# Patient Record
Sex: MALE | Race: WHITE | NOT HISPANIC OR LATINO | Employment: OTHER | URBAN - NONMETROPOLITAN AREA
[De-identification: names, ages, dates, MRNs, and addresses within clinical notes are randomized per-mention and may not be internally consistent; named-entity substitution may affect disease eponyms.]

---

## 2017-01-17 ENCOUNTER — APPOINTMENT (OUTPATIENT)
Dept: GENERAL RADIOLOGY | Facility: HOSPITAL | Age: 57
End: 2017-01-17

## 2017-01-17 ENCOUNTER — HOSPITAL ENCOUNTER (EMERGENCY)
Facility: HOSPITAL | Age: 57
Discharge: HOME | End: 2017-01-17
Admitting: EMERGENCY MEDICINE

## 2017-01-17 ENCOUNTER — APPOINTMENT (OUTPATIENT)
Dept: CARDIOLOGY | Facility: HOSPITAL | Age: 57
End: 2017-01-17

## 2017-01-17 VITALS
DIASTOLIC BLOOD PRESSURE: 78 MMHG | SYSTOLIC BLOOD PRESSURE: 132 MMHG | RESPIRATION RATE: 16 BRPM | OXYGEN SATURATION: 95 % | BODY MASS INDEX: 33.72 KG/M2 | TEMPERATURE: 97.6 F | WEIGHT: 249 LBS | HEART RATE: 61 BPM | HEIGHT: 72 IN

## 2017-01-17 DIAGNOSIS — R07.9 CHEST PAIN, UNSPECIFIED TYPE: Primary | ICD-10-CM

## 2017-01-17 PROBLEM — I25.119 CORONARY ARTERY DISEASE INVOLVING NATIVE CORONARY ARTERY OF NATIVE HEART WITH ANGINA PECTORIS (HCC): Status: ACTIVE | Noted: 2017-01-17

## 2017-01-17 LAB
ALBUMIN SERPL-MCNC: 4.5 G/DL (ref 3.5–5)
ALBUMIN/GLOB SERPL: 1.4 G/DL (ref 1.1–2.5)
ALP SERPL-CCNC: 84 U/L (ref 24–120)
ALT SERPL W P-5'-P-CCNC: 48 U/L (ref 0–54)
AMYLASE SERPL-CCNC: 66 U/L (ref 30–110)
ANION GAP SERPL CALCULATED.3IONS-SCNC: 14 MMOL/L (ref 4–13)
APTT PPP: 28.7 SECONDS (ref 24.1–34.8)
AST SERPL-CCNC: 36 U/L (ref 7–45)
BASOPHILS # BLD AUTO: 0.02 10*3/MM3 (ref 0–0.2)
BASOPHILS NFR BLD AUTO: 0.4 % (ref 0–2)
BH CV STRESS BP STAGE 1: NORMAL
BH CV STRESS DURATION MIN STAGE 1: 3
BH CV STRESS DURATION MIN STAGE 2: 2
BH CV STRESS DURATION SEC STAGE 1: 0
BH CV STRESS DURATION SEC STAGE 2: 31
BH CV STRESS ECHO POST STRESS EJECTION FRACTION EF: 60 %
BH CV STRESS GRADE STAGE 1: 10
BH CV STRESS GRADE STAGE 2: 12
BH CV STRESS HR STAGE 1: 105
BH CV STRESS HR STAGE 2: 121
BH CV STRESS METS STAGE 1: 5
BH CV STRESS METS STAGE 2: 7.5
BH CV STRESS PROTOCOL 1: NORMAL
BH CV STRESS PROTOCOL 2 BP STAGE 1: NORMAL
BH CV STRESS PROTOCOL 2 BP STAGE 2: NORMAL
BH CV STRESS PROTOCOL 2 BP STAGE 3: 127
BH CV STRESS PROTOCOL 2 BP STAGE 4: 137
BH CV STRESS PROTOCOL 2 DOSE DOBUTAMINE STAGE 1: 10
BH CV STRESS PROTOCOL 2 DOSE DOBUTAMINE STAGE 2: 20
BH CV STRESS PROTOCOL 2 DOSE DOBUTAMINE STAGE 3: 30
BH CV STRESS PROTOCOL 2 DOSE DOBUTAMINE STAGE 4: 40
BH CV STRESS PROTOCOL 2 DURATION MIN STAGE 1: 3
BH CV STRESS PROTOCOL 2 DURATION MIN STAGE 2: 3
BH CV STRESS PROTOCOL 2 DURATION MIN STAGE 3: 3
BH CV STRESS PROTOCOL 2 DURATION MIN STAGE 4: 2
BH CV STRESS PROTOCOL 2 DURATION SEC STAGE 1: 0
BH CV STRESS PROTOCOL 2 DURATION SEC STAGE 2: 0
BH CV STRESS PROTOCOL 2 DURATION SEC STAGE 3: 0
BH CV STRESS PROTOCOL 2 DURATION SEC STAGE 4: 14
BH CV STRESS PROTOCOL 2 HR STAGE 1: 73
BH CV STRESS PROTOCOL 2 HR STAGE 2: 97
BH CV STRESS PROTOCOL 2 HR STAGE 3: NORMAL
BH CV STRESS PROTOCOL 2 HR STAGE 4: NORMAL
BH CV STRESS PROTOCOL 2 STAGE 1: 1
BH CV STRESS PROTOCOL 2 STAGE 2: 2
BH CV STRESS PROTOCOL 2 STAGE 3: 3
BH CV STRESS PROTOCOL 2 STAGE 4: 4
BH CV STRESS PROTOCOL 2: NORMAL
BH CV STRESS RECOVERY BP: NORMAL MMHG
BH CV STRESS RECOVERY HR: 89 BPM
BH CV STRESS SPEED STAGE 1: 1.7
BH CV STRESS SPEED STAGE 2: 2.5
BH CV STRESS STAGE 1: 1
BH CV STRESS STAGE 2: 2
BILIRUB SERPL-MCNC: 0.9 MG/DL (ref 0.1–1)
BILIRUB UR QL STRIP: NEGATIVE
BUN BLD-MCNC: 15 MG/DL (ref 5–21)
BUN/CREAT SERPL: 15 (ref 7–25)
CALCIUM SPEC-SCNC: 9.5 MG/DL (ref 8.4–10.4)
CHLORIDE SERPL-SCNC: 98 MMOL/L (ref 98–110)
CK MB SERPL-CCNC: 1.37 NG/ML (ref 0–5)
CK SERPL-CCNC: 92 U/L (ref 0–203)
CLARITY UR: CLEAR
CO2 SERPL-SCNC: 27 MMOL/L (ref 24–31)
COLOR UR: YELLOW
CREAT BLD-MCNC: 1 MG/DL (ref 0.5–1.4)
CRP SERPL-MCNC: 1.89 MG/DL (ref 0–0.99)
D DIMER PPP FEU-MCNC: 0.34 MG/L (FEU) (ref 0–0.5)
DEPRECATED RDW RBC AUTO: 41.4 FL (ref 40–54)
EOSINOPHIL # BLD AUTO: 0.17 10*3/MM3 (ref 0–0.7)
EOSINOPHIL NFR BLD AUTO: 3.3 % (ref 0–4)
ERYTHROCYTE [DISTWIDTH] IN BLOOD BY AUTOMATED COUNT: 12.7 % (ref 12–15)
GFR SERPL CREATININE-BSD FRML MDRD: 77 ML/MIN/1.73
GLOBULIN UR ELPH-MCNC: 3.3 GM/DL
GLUCOSE BLD-MCNC: 148 MG/DL (ref 70–100)
GLUCOSE UR STRIP-MCNC: NEGATIVE MG/DL
HCT VFR BLD AUTO: 40.6 % (ref 40–52)
HGB BLD-MCNC: 13.6 G/DL (ref 14–18)
HGB UR QL STRIP.AUTO: NEGATIVE
IMM GRANULOCYTES # BLD: 0.02 10*3/MM3 (ref 0–0.03)
IMM GRANULOCYTES NFR BLD: 0.4 % (ref 0–5)
INR PPP: 0.91 (ref 0.91–1.09)
KETONES UR QL STRIP: NEGATIVE
LEUKOCYTE ESTERASE UR QL STRIP.AUTO: NEGATIVE
LIPASE SERPL-CCNC: 38 U/L (ref 23–203)
LV EF 2D ECHO EST: 55 %
LYMPHOCYTES # BLD AUTO: 1.59 10*3/MM3 (ref 0.72–4.86)
LYMPHOCYTES NFR BLD AUTO: 31.2 % (ref 15–45)
MAXIMAL PREDICTED HEART RATE: 162 BPM
MCH RBC QN AUTO: 30.2 PG (ref 28–32)
MCHC RBC AUTO-ENTMCNC: 33.5 G/DL (ref 33–36)
MCV RBC AUTO: 90.2 FL (ref 82–95)
MONOCYTES # BLD AUTO: 0.52 10*3/MM3 (ref 0.19–1.3)
MONOCYTES NFR BLD AUTO: 10.2 % (ref 4–12)
NEUTROPHILS # BLD AUTO: 2.78 10*3/MM3 (ref 1.87–8.4)
NEUTROPHILS NFR BLD AUTO: 54.5 % (ref 39–78)
NITRITE UR QL STRIP: NEGATIVE
PERCENT MAX PREDICTED HR: 84.57 %
PH UR STRIP.AUTO: 5.5 [PH] (ref 5–8)
PLATELET # BLD AUTO: 110 10*3/MM3 (ref 130–400)
PMV BLD AUTO: 12.1 FL (ref 6–12)
POTASSIUM BLD-SCNC: 4.3 MMOL/L (ref 3.5–5.3)
PROT SERPL-MCNC: 7.8 G/DL (ref 6.3–8.7)
PROT UR QL STRIP: NEGATIVE
PROTHROMBIN TIME: 12.5 SECONDS (ref 11.9–14.6)
RBC # BLD AUTO: 4.5 10*6/MM3 (ref 4.8–5.9)
SODIUM BLD-SCNC: 139 MMOL/L (ref 135–145)
SP GR UR STRIP: 1.03 (ref 1–1.03)
STRESS BASELINE BP: NORMAL MMHG
STRESS BASELINE HR: 73 BPM
STRESS PERCENT HR: 99 %
STRESS POST EXERCISE DUR MIN: 11 MIN
STRESS POST EXERCISE DUR SEC: 14 SEC
STRESS POST PEAK BP: NORMAL MMHG
STRESS POST PEAK HR: 137 BPM
STRESS TARGET HR: 138 BPM
TROPONIN I SERPL-MCNC: 0 NG/ML (ref 0–0.07)
TROPONIN I SERPL-MCNC: 0 NG/ML (ref 0–0.07)
TROPONIN I SERPL-MCNC: <0.012 NG/ML (ref 0–0.03)
UROBILINOGEN UR QL STRIP: NORMAL
WBC NRBC COR # BLD: 5.1 10*3/MM3 (ref 4.8–10.8)

## 2017-01-17 PROCEDURE — 25010000002 PERFLUTREN (DEFINITY) 8.476 MG IN SODIUM CHLORIDE 10 ML INJECTION: Performed by: INTERNAL MEDICINE

## 2017-01-17 PROCEDURE — 82550 ASSAY OF CK (CPK): CPT | Performed by: NURSE PRACTITIONER

## 2017-01-17 PROCEDURE — 96374 THER/PROPH/DIAG INJ IV PUSH: CPT

## 2017-01-17 PROCEDURE — 84484 ASSAY OF TROPONIN QUANT: CPT | Performed by: NURSE PRACTITIONER

## 2017-01-17 PROCEDURE — 99285 EMERGENCY DEPT VISIT HI MDM: CPT

## 2017-01-17 PROCEDURE — 93017 CV STRESS TEST TRACING ONLY: CPT

## 2017-01-17 PROCEDURE — 93351 STRESS TTE COMPLETE: CPT

## 2017-01-17 PROCEDURE — 93350 STRESS TTE ONLY: CPT

## 2017-01-17 PROCEDURE — 82553 CREATINE MB FRACTION: CPT | Performed by: NURSE PRACTITIONER

## 2017-01-17 PROCEDURE — 85730 THROMBOPLASTIN TIME PARTIAL: CPT | Performed by: NURSE PRACTITIONER

## 2017-01-17 PROCEDURE — 74220 X-RAY XM ESOPHAGUS 1CNTRST: CPT

## 2017-01-17 PROCEDURE — 86140 C-REACTIVE PROTEIN: CPT | Performed by: NURSE PRACTITIONER

## 2017-01-17 PROCEDURE — 85610 PROTHROMBIN TIME: CPT | Performed by: NURSE PRACTITIONER

## 2017-01-17 PROCEDURE — 81003 URINALYSIS AUTO W/O SCOPE: CPT | Performed by: NURSE PRACTITIONER

## 2017-01-17 PROCEDURE — 82150 ASSAY OF AMYLASE: CPT | Performed by: NURSE PRACTITIONER

## 2017-01-17 PROCEDURE — 93005 ELECTROCARDIOGRAM TRACING: CPT | Performed by: NURSE PRACTITIONER

## 2017-01-17 PROCEDURE — 80053 COMPREHEN METABOLIC PANEL: CPT | Performed by: NURSE PRACTITIONER

## 2017-01-17 PROCEDURE — 84484 ASSAY OF TROPONIN QUANT: CPT

## 2017-01-17 PROCEDURE — 85379 FIBRIN DEGRADATION QUANT: CPT | Performed by: NURSE PRACTITIONER

## 2017-01-17 PROCEDURE — 83690 ASSAY OF LIPASE: CPT | Performed by: NURSE PRACTITIONER

## 2017-01-17 PROCEDURE — 93018 CV STRESS TEST I&R ONLY: CPT | Performed by: INTERNAL MEDICINE

## 2017-01-17 PROCEDURE — 71010 HC CHEST PA OR AP: CPT

## 2017-01-17 PROCEDURE — 93005 ELECTROCARDIOGRAM TRACING: CPT

## 2017-01-17 PROCEDURE — 93010 ELECTROCARDIOGRAM REPORT: CPT | Performed by: INTERNAL MEDICINE

## 2017-01-17 PROCEDURE — 85025 COMPLETE CBC W/AUTO DIFF WBC: CPT | Performed by: NURSE PRACTITIONER

## 2017-01-17 PROCEDURE — 25010000003 DOBUTAMINE PER 250 MG: Performed by: INTERNAL MEDICINE

## 2017-01-17 PROCEDURE — 93352 ADMIN ECG CONTRAST AGENT: CPT | Performed by: INTERNAL MEDICINE

## 2017-01-17 PROCEDURE — 93350 STRESS TTE ONLY: CPT | Performed by: INTERNAL MEDICINE

## 2017-01-17 PROCEDURE — 99213 OFFICE O/P EST LOW 20 MIN: CPT | Performed by: INTERNAL MEDICINE

## 2017-01-17 RX ORDER — NITROGLYCERIN 0.4 MG/1
0.4 TABLET SUBLINGUAL
Status: COMPLETED | OUTPATIENT
Start: 2017-01-17 | End: 2017-01-17

## 2017-01-17 RX ORDER — HYDRALAZINE HYDROCHLORIDE 25 MG/1
25 TABLET, FILM COATED ORAL 2 TIMES DAILY
COMMUNITY
End: 2017-04-21 | Stop reason: ALTCHOICE

## 2017-01-17 RX ORDER — INSULIN GLARGINE 100 [IU]/ML
20 INJECTION, SOLUTION SUBCUTANEOUS DAILY
COMMUNITY
End: 2017-04-21 | Stop reason: ALTCHOICE

## 2017-01-17 RX ORDER — DOBUTAMINE HYDROCHLORIDE 100 MG/100ML
10-50 INJECTION INTRAVENOUS
Status: DISCONTINUED | OUTPATIENT
Start: 2017-01-17 | End: 2017-01-17 | Stop reason: HOSPADM

## 2017-01-17 RX ORDER — ASPIRIN 325 MG
325 TABLET ORAL ONCE
Status: COMPLETED | OUTPATIENT
Start: 2017-01-17 | End: 2017-01-17

## 2017-01-17 RX ORDER — SODIUM CHLORIDE 0.9 % (FLUSH) 0.9 %
10 SYRINGE (ML) INJECTION AS NEEDED
Status: DISCONTINUED | OUTPATIENT
Start: 2017-01-17 | End: 2017-01-17 | Stop reason: HOSPADM

## 2017-01-17 RX ADMIN — SODIUM CHLORIDE 5 ML: 9 INJECTION INTRAMUSCULAR; INTRAVENOUS; SUBCUTANEOUS at 14:57

## 2017-01-17 RX ADMIN — ASPIRIN 325 MG: 325 TABLET, COATED ORAL at 10:45

## 2017-01-17 RX ADMIN — NITROGLYCERIN 0.4 MG: 0.4 TABLET SUBLINGUAL at 11:14

## 2017-01-17 RX ADMIN — DOBUTAMINE HYDROCHLORIDE 40 MCG/KG/MIN: 100 INJECTION INTRAVENOUS at 15:26

## 2017-01-17 RX ADMIN — NITROGLYCERIN 0.4 MG: 0.4 TABLET SUBLINGUAL at 10:45

## 2017-01-17 RX ADMIN — NITROGLYCERIN 0.4 MG: 0.4 TABLET SUBLINGUAL at 11:00

## 2017-01-17 RX ADMIN — SODIUM CHLORIDE 5 ML: 9 INJECTION INTRAMUSCULAR; INTRAVENOUS; SUBCUTANEOUS at 15:37

## 2017-01-17 NOTE — CONSULTS
[facility] HEART GROUP CONSULT NOTE    Referring Provider: No ref. provider found    Reason for Consultation: Chest Pain     Chief Complaint   Patient presents with   • Chest Pain       Subjective .     History of present illness:  Carlos A Boyer Jr. is a 56 y.o. male with a known PMH significant for CAD s/p multiple stents, most recent cath 1/7/16, which revealed widely patent previously placed stents in the left anterior descending and obstructive disease involving the diagonal branch which was treated medically, Type 2 DM, HTN, and previous CVA, who presented to [facility] via ED with complaints of chest pain. Patient reports a possible syncopal event 2 days ago- BP and BG reportedly normal. The following day, he started having left sided sharp non exertional CP with pressure with associated dyspnea, unrelieved with Tums. Due to continued symptoms he presented for medical evaluation. Patient was previously followed by Dr. Broadbent. Initial troponin negative. No acute ECG changes noted. Relieved with SL Nitro.     History  Past Medical History   Diagnosis Date   • Coronary artery disease    • Diabetes mellitus    • GERD (gastroesophageal reflux disease)    • Hypertension    • Kidney stone    • Murmur, heart    • Myocardial infarction    • Seizures    • Stroke    ,   Past Surgical History   Procedure Laterality Date   • Hernia repair     • Kidney stone surgery     • Cardiac catheterization  01/2016     Dr. Broadbent; widely patent previously placed stents in the left anterior descending and obstructive disease involving the diagonal branch which was treated medically   • Coronary stent placement     • Coronary angioplasty     ,   History reviewed. No pertinent family history.,   Social History   Substance Use Topics   • Smoking status: Former Smoker   • Smokeless tobacco: None   • Alcohol use No   ,     Medications  Current Facility-Administered Medications   Medication Dose Route Frequency Provider Last  Rate Last Dose   • sodium chloride 0.9 % flush 10 mL  10 mL Intravenous PRN TEENA Arriola         Current Outpatient Prescriptions   Medication Sig Dispense Refill   • albuterol (PROVENTIL HFA;VENTOLIN HFA) 108 (90 BASE) MCG/ACT inhaler Inhale 2 puffs Every 6 (Six) Hours As Needed for wheezing.     • carboxymethylcellulose (REFRESH PLUS) 0.5 % solution 1 drop 4 (Four) Times a Day As Needed for dry eyes.     • clopidogrel (PLAVIX) 75 MG tablet Take 75 mg by mouth Daily.     • etodolac (LODINE) 400 MG tablet Take 400 mg by mouth 2 (Two) Times a Day.     • FLUTICASONE PROPIONATE, NASAL, NA 50 mcg into each nostril Daily. 2 SPRAYS IN EACH NOSTRIL     • glipiZIDE (GLUCOTROL) 10 MG tablet Take 20 mg by mouth 2 (Two) Times a Day Before Meals.     • hydrALAZINE (APRESOLINE) 25 MG tablet Take 25 mg by mouth 2 (Two) Times a Day.     • insulin glargine (LANTUS) 100 UNIT/ML injection Inject 20 Units under the skin Daily.     • isosorbide dinitrate (ISORDIL) 20 MG tablet Take 1 tablet by mouth 2 (Two) Times a Day. 60 tablet 0   • lamoTRIgine (LaMICtal) 100 MG tablet Take 200 mg by mouth 2 (Two) Times a Day.     • levETIRAcetam (KEPPRA) 500 MG tablet Take 2,000 mg by mouth 2 (Two) Times a Day.     • lisinopril (PRINIVIL,ZESTRIL) 40 MG tablet Take 0.5 tablets by mouth Every Night. 30 tablet 0   • metFORMIN (GLUCOPHAGE) 1000 MG tablet Take 1,000 mg by mouth 2 (Two) Times a Day With Meals.     • metoprolol tartrate (LOPRESSOR) 50 MG tablet Take 1 tablet by mouth 2 (Two) Times a Day. 60 tablet 0   • nitroglycerin (NITROSTAT) 0.4 MG SL tablet Place 0.4 mg under the tongue Every 5 (Five) Minutes As Needed for chest pain. Take no more than 3 doses in 15 minutes.     • pantoprazole (PROTONIX) 40 MG EC tablet Take 40 mg by mouth Daily.     • psyllium (METAMUCIL) 58.6 % packet Take  by mouth Daily. 1 TABLESPOON     • rosuvastatin (CRESTOR) 40 MG tablet Take 20 mg by mouth Daily.     • triamterene-hydrochlorothiazide (MAXZIDE)  "75-50 MG per tablet Take 0.5 tablets by mouth Daily.     • ammonium lactate (AMLACTIN) 12 % cream Apply 1 application topically 2 (Two) Times a Day As Needed for dry skin.     • ketoconazole (NIZORAL) 2 % cream Apply 1 application topically 2 (Two) Times a Day.     • LACTOBACILLUS PO Take  by mouth Daily.     • methocarbamol (ROBAXIN) 750 MG tablet Take 750 mg by mouth 4 (Four) Times a Day As Needed for muscle spasms.     • promethazine (PHENERGAN) 50 MG tablet Take 25 mg by mouth Every 6 (Six) Hours As Needed for nausea or vomiting.     • urea (CARMOL) 40 % ointment Apply  topically Daily.         Allergies:  Atorvastatin; Flagyl [metronidazole]; and Ciprofloxacin    Review of Systems  Review of Systems   Constitution: Positive for chills, diaphoresis and malaise/fatigue. Negative for fever and weakness.   Cardiovascular: Positive for chest pain, dyspnea on exertion, near-syncope and syncope. Negative for irregular heartbeat, leg swelling, orthopnea, palpitations and paroxysmal nocturnal dyspnea.   Respiratory: Positive for cough, shortness of breath and wheezing. Negative for sleep disturbances due to breathing and sputum production.    Skin: Negative for rash.   Musculoskeletal: Positive for falls.        Chronic left shoulder pain    Gastrointestinal: Positive for abdominal pain. Negative for bloating, nausea and vomiting.   Neurological: Positive for dizziness. Negative for focal weakness.   Psychiatric/Behavioral: Negative for altered mental status.       Objective     Physical Exam:  Patient Vitals for the past 24 hrs:   BP Temp Temp src Pulse Resp SpO2 Height Weight   01/17/17 1130 107/68 - - 62 18 93 % - -   01/17/17 1116 103/64 - - 66 12 96 % - -   01/17/17 1013 - 97.6 °F (36.4 °C) Oral - - - - -   01/17/17 1005 - - - - - - 72\" (182.9 cm) 249 lb (113 kg)   01/17/17 0954 121/80 - - 62 11 96 % - -   01/17/17 0949 140/94 - - - - - - -     Physical Exam   Constitutional: He is oriented to person, place, and " time. He appears well-developed and well-nourished. He is cooperative. No distress.   HENT:   Head: Normocephalic and atraumatic.   Cardiovascular: Normal rate, regular rhythm, S1 normal, S2 normal, normal heart sounds and intact distal pulses.    No murmur heard.  Telemetry: SR/SB 50's    Pulmonary/Chest: Effort normal and breath sounds normal. No accessory muscle usage. No respiratory distress.   Abdominal: Soft. Bowel sounds are normal. He exhibits distension. There is tenderness (With palpation).   Musculoskeletal: He exhibits no edema.   Neurological: He is alert and oriented to person, place, and time.   Skin: Skin is warm and dry. No rash noted. He is not diaphoretic.   Psychiatric: He has a normal mood and affect. His speech is normal and behavior is normal.   Vitals reviewed.      Results Review:   I reviewed the patient's new clinical results.  Lab Results   Component Value Date    WBC 5.10 01/17/2017    HGB 13.6 (L) 01/17/2017    HCT 40.6 01/17/2017    MCV 90.2 01/17/2017     (L) 01/17/2017     Lab Results   Component Value Date    GLUCOSE 148 (H) 01/17/2017    CALCIUM 9.5 01/17/2017     01/17/2017    K 4.3 01/17/2017    CO2 27.0 01/17/2017    CL 98 01/17/2017    BUN 15 01/17/2017    CREATININE 1.00 01/17/2017    EGFRIFNONA 77 01/17/2017    BCR 15.0 01/17/2017    ANIONGAP 14.0 (H) 01/17/2017   Troponin @1010: 0.00       Imaging Results (last 72 hours)     Procedure Component Value Units Date/Time    XR Chest 1 View [[id number]] Collected:  01/17/17 1010     Updated:  01/17/17 1012    Narrative:       XR CHEST 1 VW- 1/17/2017 9:58 CST     HISTORY: chest  pain       COMPARISON: 05/24/2016.     FINDINGS:   The lungs are clear. The heart remains enlarged. Pulmonary vascular  structures are stable in appearance.      The osseous structures and surrounding soft tissues demonstrate no acute  abnormality.       Impression:       1. Mild cardiomegaly.     Electronically Signed By-Dr. Delio Langston MD  On:1/17/2017 11:10 AM  EST  This report was finalized on 01/17/2017 10:10 by Dr. Delio Langston MD.        · 10/16/16- Echo: Left ventricular function is normal with estimated ejection fraction of 55-60%.  · Left ventricular diastolic dysfunction (grade II) consistent with pseudonormalization.  · Right ventricular size and systolic function are normal.  · Mild mitral valve regurgitation is present          Mild aortic valve regurgitation is present.      Principal Problem:    Coronary artery disease involving native coronary artery of native heart with unspecified angina pectoris    Active Problems:    Type 2 diabetes mellitus    Essential hypertension    Seizure disorder    Carotid disease, bilateral      Plan   1. Repeat second set of cardiac enzymes and ECG 3 hours after the initial set.   2. Obtain Stress Echo if second set of enzymes negative.   3. Obtain barium swallow   4. D/C home if negative Stress Echo low risk for ischemia with plans for close [facility] follow up.     Plan of care discussed with Dr. Ramey.       TEENA Borges        Please note this cardiology consultation note is the result of a face to face consultation with the patient, in addition to reviewing medical records at length by myself, TEENA Simental.     Time: More than 50% of time spent in counseling and coordination of care:  Total face-to-face/floor time 40 min.  Time spent in counseling 25 min. Counseling included the following topics: lab results, ECG results, assessment, plan of care

## 2017-01-17 NOTE — ED PROVIDER NOTES
Subjective   HPI Comments: Patient is a 48-year-old white male who presents ER today complaining of left-sided chest pain.  Patient reports that he is pain is to the left breast.  Reports the pain is nonradiating.  Patient has a history of 6 stents in the past.  Patient last had a 2D echocardiogram done on 10/17/2016.  This showed a left ventricular function that was normal with an estimated ejection fraction 55-60%.  Had a left ventricular diastolic dysfunction grade 2 right right ventricular size and systolic function are normal.  Mild mitral valve regurgitation present as well as mild aortic valve regurgitation.  Patient had a cardiac catheterization done on 1/11/ 2016.  This was done by Dr. Broadbent pt former cardiologist.  Patient had a stent placed to the left circumflex coronary artery at that time.  There was widely patent previously placed stent in the left anterior descending.  Patient reports that he had chest pain yesterday.  He reports it is nonradiating it is constant and not intermittent in nature.  He reports that he does feel somewhat short of breath with this.  Patient is previous history of DVT in the past.  He is on Plavix at this time.  Patient presents ER today for further evaluation.  He denies any abdominal pain as she nausea vomiting or fever.  She denies any cough congestion.  She also reports that yesterday he did have some heart palpitations.  He states that his heart rate felt irregular.  He noted at one time his heart rate was 90 several hours later it was down the 50s.  His wife reports several days ago he had a heart rate in the high 40s.  Patient states this is unusual for him.  He is on several beta blockers at this time.  Presents to ER today for evaluation.                                                                                                                                                                                                                                                                                                                                                                                                                                                                                                                                                                                                                                                                                                                                                                                                                                                             Patient is a 56 y.o. male presenting with chest pain.   History provided by:  Patient   used: No    Chest Pain   Pain location:  Substernal area  Pain quality: aching and throbbing    Pain radiates to:  Does not radiate  Pain severity:  Mild  Onset quality:  Sudden  Duration:  1 day  Timing:  Constant  Progression:  Worsening  Chronicity:  New  Context: at rest    Context: not breathing, not drug use, not eating, not intercourse, not lifting, not movement, not raising an arm, not stress and not trauma    Relieved by:  Nothing  Worsened by:  Nothing  Ineffective treatments:  None tried  Associated symptoms: shortness of breath    Associated symptoms: no abdominal pain, no AICD problem, no altered mental status, no anorexia, no anxiety, no back pain, no claudication, no cough, no dizziness, no dysphagia, no fatigue, no fever, no headache, no heartburn, no lower extremity edema, no nausea, no near-syncope, no numbness, no orthopnea, no palpitations, no PND, no syncope, no vomiting and no weakness    Risk factors: male sex    Risk factors: no aortic disease, no birth control, no coronary  artery disease, no diabetes mellitus, no Marcia-Danlos syndrome, no high cholesterol, no hypertension, no immobilization, no Marfan's syndrome, not obese, not pregnant, no prior DVT/PE, no smoking and no surgery        Review of Systems   Constitutional: Negative for fatigue and fever.   HENT: Negative for trouble swallowing.    Respiratory: Positive for shortness of breath. Negative for cough.    Cardiovascular: Positive for chest pain. Negative for palpitations, orthopnea, claudication, syncope, PND and near-syncope.   Gastrointestinal: Negative for abdominal pain, anorexia, heartburn, nausea and vomiting.   Musculoskeletal: Negative for back pain.   Neurological: Negative for dizziness, weakness, numbness and headaches.   All other systems reviewed and are negative.      Past Medical History   Diagnosis Date   • Asthma    • Coronary artery disease    • Diabetes mellitus    • GERD (gastroesophageal reflux disease)    • Hyperlipidemia    • Hypertension    • Kidney stone    • Murmur, heart    • Myocardial infarction    • Seizures    • Stroke        Allergies   Allergen Reactions   • Atorvastatin Anaphylaxis   • Flagyl [Metronidazole] Anaphylaxis   • Ciprofloxacin Hives       Past Surgical History   Procedure Laterality Date   • Hernia repair     • Kidney stone surgery     • Cardiac catheterization  01/2016     Dr. Broadbent; widely patent previously placed stents in the left anterior descending and obstructive disease involving the diagonal branch which was treated medically   • Coronary stent placement     • Coronary angioplasty         Family History   Problem Relation Age of Onset   • Heart disease Father    • Heart disease Maternal Grandmother        Social History     Social History   • Marital status:      Spouse name: N/A   • Number of children: N/A   • Years of education: N/A     Social History Main Topics   • Smoking status: Former Smoker   • Smokeless tobacco: Former User     Types: Chew     Quit  date: 2009   • Alcohol use No   • Drug use: No   • Sexual activity: Defer     Other Topics Concern   • None     Social History Narrative           Objective   Physical Exam   Constitutional: He is oriented to person, place, and time. He appears well-developed and well-nourished.   HENT:   Head: Normocephalic and atraumatic.   Eyes: Conjunctivae are normal. Pupils are equal, round, and reactive to light.   Neck: Normal range of motion. Neck supple.   Cardiovascular: Normal rate, regular rhythm and normal heart sounds.    Pulmonary/Chest: Effort normal and breath sounds normal.   Abdominal: Soft. Bowel sounds are normal.   Musculoskeletal: Normal range of motion.   Neurological: He is alert and oriented to person, place, and time.   Skin: Skin is warm and dry.   Psychiatric: He has a normal mood and affect.   Nursing note and vitals reviewed.      Procedures         ED Course  ED Course   Comment By Time   CXR: 1. Mild cardiomegaly. Mervat Choe, APRN 01/17 1110   Patient was reviewed at this time.  His urinalysis is unremarkable.  Troponin is 0.00, CRP 1.89.  His glucose is 148.  Other labs unremarkable at this time.  Mervat Choe, APRN 01/17 1111   Patient did have a 325 mg aspirin by mouth.  He also received nitroglycerin 0.4 mg sublingual ×3 tablets.  The is did relieve his pain at this time. Mervat Choe, APRN 01/17 1119   At this time, patient is pain-free.  I did discuss the results: This time.  He agrees with plan of care.  I did talk with Mirna with cardiology.  They will have someone come and evaluate the patient for further evaluation at this time.  And his spouse is up-to-date on the plantar at this time.  Patient is resting comfortably no distress noted. Mervat Choe, APRN 01/17 1119   Per cardiology consult, pt will have barium swallow completed. Will have 2 sets of cardiac enzymes obtained; if second set negative, will have stress echo.  Mervat Choe, APRN 01/17 1327   Barium  swallow: normal Mervat Choe, APRN 01/17 1351   Pt had negative second set of cardiac markers and EKG at this time. Pt having stress test performed at this time.  Mervat Choe, APRN 01/17 1514   Pt back from stress test at this time; pending reading of stress test for final results.  Mervat Choe, APRN 01/17 1711   DSE: · All left ventricular wall segments contract normally.  · Left ventricular function is normal. Estimated EF = 55%.  · Normal stress echo with no significant echocardiographic evidence for myocardial ischemia. Mervat Choe, APRN 01/17 1724   Did discuss with Dr. Ramey patient's case at this time.  The dobutamine stress echocardiogram showed a normal left ventricular function with an estimated EF of 55%.  There is a normal stress echo with no significant echocardiographic evidence for myocardial ischemia at this time. Mervat Choe, APRN 01/17 8504   He both felt that the patient be discharged home at this time in stable condition.  He advised that he would like to see the patient in his office for follow-up appointment in 2 weeks.  I have discussed this with the patient his wife and they are agreeable to this plan.  Patient states that he feels much better and is ready to go home.  This time will be discharged home in stable condition.  He is advised reports return to the ER if any new or worsening symptoms. Mervat Choe, APRN 01/17 3718            HEART Score  History: Slightly suspicious (+0)  ECG: Non specific repolarization disturbance (+1)  Age: 45 through 65 (+1)  Risk Factors: 3 or more risk factors OR history of atherosclerotic disease (+2)  Troponin: Normal limit or lower (+0)  Total: 4         MDM  Number of Diagnoses or Management Options  Chest pain, unspecified type: new and requires workup     Amount and/or Complexity of Data Reviewed  Clinical lab tests: ordered and reviewed  Tests in the radiology section of CPT®: ordered and reviewed  Discuss the patient  with other providers: yes  Independent visualization of images, tracings, or specimens: yes    Patient Progress  Patient progress: stable      Final diagnoses:   Chest pain, unspecified type            Mervat Choe, TEENA  01/17/17 1948       Mervat Choe, APRN  02/16/17 0025       Mervat Choe, APRN  02/21/17 1131

## 2017-01-18 NOTE — DISCHARGE INSTRUCTIONS
Please call Dr. Ramey office tomorrow to schedule follow-up appointment.  Return to the ER as needed sooner if any new or worsening symptoms.

## 2017-02-01 ENCOUNTER — HOSPITAL ENCOUNTER (OUTPATIENT)
Facility: HOSPITAL | Age: 57
Setting detail: OBSERVATION
Discharge: HOME | End: 2017-02-04
Attending: INTERNAL MEDICINE | Admitting: INTERNAL MEDICINE

## 2017-02-01 DIAGNOSIS — R07.9 CHEST PAIN, UNSPECIFIED TYPE: Primary | ICD-10-CM

## 2017-02-01 LAB
ALBUMIN SERPL-MCNC: 4.5 G/DL (ref 3.5–5)
ALBUMIN/GLOB SERPL: 1.5 G/DL (ref 1.1–2.5)
ALP SERPL-CCNC: 85 U/L (ref 24–120)
ALT SERPL W P-5'-P-CCNC: 61 U/L (ref 0–54)
ANION GAP SERPL CALCULATED.3IONS-SCNC: 12 MMOL/L (ref 4–13)
APTT PPP: 23.6 SECONDS (ref 24.1–34.8)
AST SERPL-CCNC: 39 U/L (ref 7–45)
BASOPHILS # BLD AUTO: 0.02 10*3/MM3 (ref 0–0.2)
BASOPHILS NFR BLD AUTO: 0.3 % (ref 0–2)
BILIRUB SERPL-MCNC: 0.7 MG/DL (ref 0.1–1)
BUN BLD-MCNC: 18 MG/DL (ref 5–21)
BUN/CREAT SERPL: 13.3 (ref 7–25)
CALCIUM SPEC-SCNC: 9.4 MG/DL (ref 8.4–10.4)
CHLORIDE SERPL-SCNC: 100 MMOL/L (ref 98–110)
CO2 SERPL-SCNC: 22 MMOL/L (ref 24–31)
CREAT BLD-MCNC: 1.35 MG/DL (ref 0.5–1.4)
DEPRECATED RDW RBC AUTO: 42 FL (ref 40–54)
EOSINOPHIL # BLD AUTO: 0.09 10*3/MM3 (ref 0–0.7)
EOSINOPHIL NFR BLD AUTO: 1.4 % (ref 0–4)
ERYTHROCYTE [DISTWIDTH] IN BLOOD BY AUTOMATED COUNT: 12.9 % (ref 12–15)
GFR SERPL CREATININE-BSD FRML MDRD: 54 ML/MIN/1.73
GLOBULIN UR ELPH-MCNC: 3 GM/DL
GLUCOSE BLD-MCNC: 178 MG/DL (ref 70–100)
HCT VFR BLD AUTO: 40.2 % (ref 40–52)
HGB BLD-MCNC: 13.3 G/DL (ref 14–18)
IMM GRANULOCYTES # BLD: 0.06 10*3/MM3 (ref 0–0.03)
IMM GRANULOCYTES NFR BLD: 0.9 % (ref 0–5)
INR PPP: 0.95 (ref 0.91–1.09)
LYMPHOCYTES # BLD AUTO: 1.5 10*3/MM3 (ref 0.72–4.86)
LYMPHOCYTES NFR BLD AUTO: 23 % (ref 15–45)
MCH RBC QN AUTO: 29.8 PG (ref 28–32)
MCHC RBC AUTO-ENTMCNC: 33.1 G/DL (ref 33–36)
MCV RBC AUTO: 90.1 FL (ref 82–95)
MONOCYTES # BLD AUTO: 0.41 10*3/MM3 (ref 0.19–1.3)
MONOCYTES NFR BLD AUTO: 6.3 % (ref 4–12)
NEUTROPHILS # BLD AUTO: 4.45 10*3/MM3 (ref 1.87–8.4)
NEUTROPHILS NFR BLD AUTO: 68.1 % (ref 39–78)
PLATELET # BLD AUTO: 127 10*3/MM3 (ref 130–400)
PMV BLD AUTO: 11.5 FL (ref 6–12)
POTASSIUM BLD-SCNC: 5.4 MMOL/L (ref 3.5–5.3)
PROT SERPL-MCNC: 7.5 G/DL (ref 6.3–8.7)
PROTHROMBIN TIME: 13 SECONDS (ref 11.9–14.6)
RBC # BLD AUTO: 4.46 10*6/MM3 (ref 4.8–5.9)
SODIUM BLD-SCNC: 134 MMOL/L (ref 135–145)
TROPONIN I SERPL-MCNC: 0 NG/ML (ref 0–0.07)
TROPONIN I SERPL-MCNC: 0 NG/ML (ref 0–0.07)
WBC NRBC COR # BLD: 6.53 10*3/MM3 (ref 4.8–10.8)

## 2017-02-01 PROCEDURE — 85610 PROTHROMBIN TIME: CPT | Performed by: NURSE PRACTITIONER

## 2017-02-01 PROCEDURE — 25010000002 MORPHINE SULFATE (PF) 2 MG/ML SOLUTION: Performed by: NURSE PRACTITIONER

## 2017-02-01 PROCEDURE — 85730 THROMBOPLASTIN TIME PARTIAL: CPT | Performed by: NURSE PRACTITIONER

## 2017-02-01 PROCEDURE — 80053 COMPREHEN METABOLIC PANEL: CPT | Performed by: NURSE PRACTITIONER

## 2017-02-01 PROCEDURE — 96375 TX/PRO/DX INJ NEW DRUG ADDON: CPT

## 2017-02-01 PROCEDURE — 96374 THER/PROPH/DIAG INJ IV PUSH: CPT

## 2017-02-01 PROCEDURE — 93005 ELECTROCARDIOGRAM TRACING: CPT

## 2017-02-01 PROCEDURE — 99285 EMERGENCY DEPT VISIT HI MDM: CPT

## 2017-02-01 PROCEDURE — 96376 TX/PRO/DX INJ SAME DRUG ADON: CPT

## 2017-02-01 PROCEDURE — G0378 HOSPITAL OBSERVATION PER HR: HCPCS

## 2017-02-01 PROCEDURE — 85025 COMPLETE CBC W/AUTO DIFF WBC: CPT | Performed by: NURSE PRACTITIONER

## 2017-02-01 PROCEDURE — 84484 ASSAY OF TROPONIN QUANT: CPT | Performed by: INTERNAL MEDICINE

## 2017-02-01 PROCEDURE — 93010 ELECTROCARDIOGRAM REPORT: CPT | Performed by: INTERNAL MEDICINE

## 2017-02-01 PROCEDURE — 25010000002 ONDANSETRON PER 1 MG: Performed by: NURSE PRACTITIONER

## 2017-02-01 PROCEDURE — 25010000002 MORPHINE PER 10 MG: Performed by: NURSE PRACTITIONER

## 2017-02-01 PROCEDURE — 93005 ELECTROCARDIOGRAM TRACING: CPT | Performed by: NURSE PRACTITIONER

## 2017-02-01 PROCEDURE — 84484 ASSAY OF TROPONIN QUANT: CPT

## 2017-02-01 RX ORDER — AMMONIUM LACTATE 12 G/100G
CREAM TOPICAL 2 TIMES DAILY PRN
Status: DISCONTINUED | OUTPATIENT
Start: 2017-02-01 | End: 2017-02-04 | Stop reason: HOSPADM

## 2017-02-01 RX ORDER — KETOCONAZOLE 20 MG/G
CREAM TOPICAL EVERY 12 HOURS SCHEDULED
Status: DISCONTINUED | OUTPATIENT
Start: 2017-02-02 | End: 2017-02-04 | Stop reason: HOSPADM

## 2017-02-01 RX ORDER — TRIAMTERENE AND HYDROCHLOROTHIAZIDE 75; 50 MG/1; MG/1
0.5 TABLET ORAL DAILY
Status: DISCONTINUED | OUTPATIENT
Start: 2017-02-02 | End: 2017-02-04 | Stop reason: HOSPADM

## 2017-02-01 RX ORDER — METOPROLOL TARTRATE 50 MG/1
50 TABLET, FILM COATED ORAL EVERY 12 HOURS SCHEDULED
Status: DISCONTINUED | OUTPATIENT
Start: 2017-02-02 | End: 2017-02-04 | Stop reason: HOSPADM

## 2017-02-01 RX ORDER — LAMOTRIGINE 100 MG/1
200 TABLET ORAL EVERY 12 HOURS SCHEDULED
Status: DISCONTINUED | OUTPATIENT
Start: 2017-02-02 | End: 2017-02-04 | Stop reason: HOSPADM

## 2017-02-01 RX ORDER — LEVETIRACETAM 500 MG/1
2000 TABLET ORAL EVERY 12 HOURS SCHEDULED
Status: DISCONTINUED | OUTPATIENT
Start: 2017-02-02 | End: 2017-02-04 | Stop reason: HOSPADM

## 2017-02-01 RX ORDER — FLUTICASONE PROPIONATE 50 MCG
2 SPRAY, SUSPENSION (ML) NASAL DAILY
Status: DISCONTINUED | OUTPATIENT
Start: 2017-02-02 | End: 2017-02-04 | Stop reason: HOSPADM

## 2017-02-01 RX ORDER — NITROGLYCERIN 0.4 MG/1
0.4 TABLET SUBLINGUAL
Status: DISCONTINUED | OUTPATIENT
Start: 2017-02-01 | End: 2017-02-04 | Stop reason: HOSPADM

## 2017-02-01 RX ORDER — GLIPIZIDE 10 MG/1
10 TABLET ORAL
Status: DISCONTINUED | OUTPATIENT
Start: 2017-02-02 | End: 2017-02-04 | Stop reason: HOSPADM

## 2017-02-01 RX ORDER — ALBUTEROL SULFATE 2.5 MG/3ML
2.5 SOLUTION RESPIRATORY (INHALATION) EVERY 6 HOURS PRN
Status: DISCONTINUED | OUTPATIENT
Start: 2017-02-01 | End: 2017-02-04 | Stop reason: HOSPADM

## 2017-02-01 RX ORDER — ONDANSETRON 2 MG/ML
4 INJECTION INTRAMUSCULAR; INTRAVENOUS ONCE
Status: COMPLETED | OUTPATIENT
Start: 2017-02-01 | End: 2017-02-01

## 2017-02-01 RX ORDER — HYDRALAZINE HYDROCHLORIDE 25 MG/1
25 TABLET, FILM COATED ORAL EVERY 12 HOURS SCHEDULED
Status: DISCONTINUED | OUTPATIENT
Start: 2017-02-02 | End: 2017-02-04 | Stop reason: HOSPADM

## 2017-02-01 RX ORDER — NICOTINE POLACRILEX 4 MG
15 LOZENGE BUCCAL
Status: DISCONTINUED | OUTPATIENT
Start: 2017-02-01 | End: 2017-02-04 | Stop reason: HOSPADM

## 2017-02-01 RX ORDER — ACETAMINOPHEN 325 MG/1
650 TABLET ORAL EVERY 6 HOURS PRN
Status: DISCONTINUED | OUTPATIENT
Start: 2017-02-01 | End: 2017-02-04 | Stop reason: HOSPADM

## 2017-02-01 RX ORDER — METHOCARBAMOL 750 MG/1
750 TABLET, FILM COATED ORAL 4 TIMES DAILY
Status: DISCONTINUED | OUTPATIENT
Start: 2017-02-02 | End: 2017-02-04 | Stop reason: HOSPADM

## 2017-02-01 RX ORDER — ISOSORBIDE DINITRATE 20 MG/1
20 TABLET ORAL EVERY 12 HOURS SCHEDULED
Status: DISCONTINUED | OUTPATIENT
Start: 2017-02-02 | End: 2017-02-04 | Stop reason: HOSPADM

## 2017-02-01 RX ORDER — PROMETHAZINE HYDROCHLORIDE 25 MG/1
25 TABLET ORAL EVERY 6 HOURS PRN
Status: DISCONTINUED | OUTPATIENT
Start: 2017-02-01 | End: 2017-02-04 | Stop reason: HOSPADM

## 2017-02-01 RX ORDER — L.ACID,PARA/B.BIFIDUM/S.THERM 8B CELL
1 CAPSULE ORAL DAILY
Status: DISCONTINUED | OUTPATIENT
Start: 2017-02-02 | End: 2017-02-04 | Stop reason: HOSPADM

## 2017-02-01 RX ORDER — PANTOPRAZOLE SODIUM 40 MG/1
40 TABLET, DELAYED RELEASE ORAL
Status: DISCONTINUED | OUTPATIENT
Start: 2017-02-02 | End: 2017-02-04 | Stop reason: HOSPADM

## 2017-02-01 RX ORDER — ATORVASTATIN CALCIUM 40 MG/1
80 TABLET, FILM COATED ORAL NIGHTLY
Status: DISCONTINUED | OUTPATIENT
Start: 2017-02-02 | End: 2017-02-02 | Stop reason: SDUPTHER

## 2017-02-01 RX ORDER — MORPHINE SULFATE 4 MG/ML
4 INJECTION, SOLUTION INTRAMUSCULAR; INTRAVENOUS ONCE
Status: COMPLETED | OUTPATIENT
Start: 2017-02-01 | End: 2017-02-01

## 2017-02-01 RX ORDER — IBUPROFEN 400 MG/1
400 TABLET ORAL 3 TIMES DAILY
Status: DISCONTINUED | OUTPATIENT
Start: 2017-02-02 | End: 2017-02-02

## 2017-02-01 RX ORDER — LISINOPRIL 20 MG/1
20 TABLET ORAL
Status: DISCONTINUED | OUTPATIENT
Start: 2017-02-02 | End: 2017-02-04 | Stop reason: HOSPADM

## 2017-02-01 RX ORDER — DEXTROSE MONOHYDRATE 25 G/50ML
25 INJECTION, SOLUTION INTRAVENOUS
Status: DISCONTINUED | OUTPATIENT
Start: 2017-02-01 | End: 2017-02-04 | Stop reason: HOSPADM

## 2017-02-01 RX ORDER — MORPHINE SULFATE 2 MG/ML
2 INJECTION, SOLUTION INTRAMUSCULAR; INTRAVENOUS ONCE
Status: COMPLETED | OUTPATIENT
Start: 2017-02-01 | End: 2017-02-01

## 2017-02-01 RX ORDER — CLOPIDOGREL BISULFATE 75 MG/1
75 TABLET ORAL DAILY
Status: DISCONTINUED | OUTPATIENT
Start: 2017-02-02 | End: 2017-02-04 | Stop reason: HOSPADM

## 2017-02-01 RX ADMIN — ONDANSETRON 4 MG: 2 INJECTION INTRAMUSCULAR; INTRAVENOUS at 17:13

## 2017-02-01 RX ADMIN — MORPHINE SULFATE 4 MG: 4 INJECTION, SOLUTION INTRAMUSCULAR; INTRAVENOUS at 17:00

## 2017-02-01 RX ADMIN — ONDANSETRON 4 MG: 2 INJECTION INTRAMUSCULAR; INTRAVENOUS at 20:42

## 2017-02-01 RX ADMIN — MORPHINE SULFATE 2 MG: 2 INJECTION, SOLUTION INTRAMUSCULAR; INTRAVENOUS at 20:42

## 2017-02-02 PROBLEM — E78.2 MIXED HYPERLIPIDEMIA: Status: ACTIVE | Noted: 2017-02-02

## 2017-02-02 LAB
ANION GAP SERPL CALCULATED.3IONS-SCNC: 10 MMOL/L (ref 4–13)
ARTICHOKE IGE QN: 117 MG/DL (ref 0–99)
BILIRUB UR QL STRIP: NEGATIVE
BUN BLD-MCNC: 21 MG/DL (ref 5–21)
BUN/CREAT SERPL: 19.8 (ref 7–25)
CALCIUM SPEC-SCNC: 9.7 MG/DL (ref 8.4–10.4)
CHLORIDE SERPL-SCNC: 97 MMOL/L (ref 98–110)
CHOLEST SERPL-MCNC: 191 MG/DL (ref 130–200)
CLARITY UR: CLEAR
CO2 SERPL-SCNC: 28 MMOL/L (ref 24–31)
COLOR UR: YELLOW
CREAT BLD-MCNC: 1.06 MG/DL (ref 0.5–1.4)
GFR SERPL CREATININE-BSD FRML MDRD: 72 ML/MIN/1.73
GLUCOSE BLD-MCNC: 117 MG/DL (ref 70–100)
GLUCOSE BLDC GLUCOMTR-MCNC: 124 MG/DL (ref 70–130)
GLUCOSE BLDC GLUCOMTR-MCNC: 142 MG/DL (ref 70–130)
GLUCOSE BLDC GLUCOMTR-MCNC: 171 MG/DL (ref 70–130)
GLUCOSE BLDC GLUCOMTR-MCNC: 201 MG/DL (ref 70–130)
GLUCOSE BLDC GLUCOMTR-MCNC: 229 MG/DL (ref 70–130)
GLUCOSE UR STRIP-MCNC: NEGATIVE MG/DL
HBA1C MFR BLD: 7.9 %
HDLC SERPL-MCNC: 45 MG/DL
HGB UR QL STRIP.AUTO: NEGATIVE
KETONES UR QL STRIP: NEGATIVE
LDLC/HDLC SERPL: 2.15 {RATIO}
LEUKOCYTE ESTERASE UR QL STRIP.AUTO: NEGATIVE
NITRITE UR QL STRIP: NEGATIVE
PH UR STRIP.AUTO: 6 [PH] (ref 5–8)
POTASSIUM BLD-SCNC: 4.5 MMOL/L (ref 3.5–5.3)
PROT UR QL STRIP: NEGATIVE
SODIUM BLD-SCNC: 135 MMOL/L (ref 135–145)
SP GR UR STRIP: 1.02 (ref 1–1.03)
TRIGL SERPL-MCNC: 247 MG/DL (ref 0–149)
TROPONIN I SERPL-MCNC: <0.012 NG/ML (ref 0–0.03)
UROBILINOGEN UR QL STRIP: NORMAL

## 2017-02-02 PROCEDURE — 93010 ELECTROCARDIOGRAM REPORT: CPT | Performed by: INTERNAL MEDICINE

## 2017-02-02 PROCEDURE — 99220 PR INITIAL OBSERVATION CARE/DAY 70 MINUTES: CPT | Performed by: INTERNAL MEDICINE

## 2017-02-02 PROCEDURE — 80061 LIPID PANEL: CPT | Performed by: INTERNAL MEDICINE

## 2017-02-02 PROCEDURE — 63710000001 INSULIN LISPRO (HUMAN) PER 5 UNITS: Performed by: INTERNAL MEDICINE

## 2017-02-02 PROCEDURE — G0378 HOSPITAL OBSERVATION PER HR: HCPCS

## 2017-02-02 PROCEDURE — 81003 URINALYSIS AUTO W/O SCOPE: CPT | Performed by: INTERNAL MEDICINE

## 2017-02-02 PROCEDURE — 93005 ELECTROCARDIOGRAM TRACING: CPT | Performed by: INTERNAL MEDICINE

## 2017-02-02 PROCEDURE — 63710000001 PROMETHAZINE PER 25 MG: Performed by: INTERNAL MEDICINE

## 2017-02-02 PROCEDURE — 82962 GLUCOSE BLOOD TEST: CPT

## 2017-02-02 PROCEDURE — 83036 HEMOGLOBIN GLYCOSYLATED A1C: CPT | Performed by: INTERNAL MEDICINE

## 2017-02-02 PROCEDURE — 63710000001 INSULIN DETEMIR PER 5 UNITS: Performed by: INTERNAL MEDICINE

## 2017-02-02 PROCEDURE — 80048 BASIC METABOLIC PNL TOTAL CA: CPT | Performed by: NURSE PRACTITIONER

## 2017-02-02 RX ORDER — ROSUVASTATIN CALCIUM 10 MG/1
40 TABLET, COATED ORAL NIGHTLY
Status: DISCONTINUED | OUTPATIENT
Start: 2017-02-02 | End: 2017-02-04 | Stop reason: HOSPADM

## 2017-02-02 RX ORDER — COLCHICINE 0.6 MG/1
0.6 TABLET ORAL EVERY 12 HOURS SCHEDULED
Status: DISCONTINUED | OUTPATIENT
Start: 2017-02-02 | End: 2017-02-04 | Stop reason: HOSPADM

## 2017-02-02 RX ORDER — ASPIRIN 81 MG/1
81 TABLET, CHEWABLE ORAL DAILY
Status: DISCONTINUED | OUTPATIENT
Start: 2017-02-02 | End: 2017-02-04 | Stop reason: HOSPADM

## 2017-02-02 RX ADMIN — ISOSORBIDE DINITRATE 20 MG: 20 TABLET ORAL at 08:50

## 2017-02-02 RX ADMIN — LEVETIRACETAM 2000 MG: 500 TABLET, FILM COATED ORAL at 20:19

## 2017-02-02 RX ADMIN — LAMOTRIGINE 200 MG: 100 TABLET ORAL at 01:16

## 2017-02-02 RX ADMIN — METOPROLOL TARTRATE 50 MG: 50 TABLET ORAL at 20:19

## 2017-02-02 RX ADMIN — ROSUVASTATIN CALCIUM 40 MG: 10 TABLET, FILM COATED ORAL at 20:19

## 2017-02-02 RX ADMIN — CLOPIDOGREL BISULFATE 75 MG: 75 TABLET, FILM COATED ORAL at 08:49

## 2017-02-02 RX ADMIN — METHOCARBAMOL 750 MG: 750 TABLET ORAL at 17:56

## 2017-02-02 RX ADMIN — IBUPROFEN 400 MG: 400 TABLET ORAL at 08:51

## 2017-02-02 RX ADMIN — Medication 1 PACKET: at 08:51

## 2017-02-02 RX ADMIN — LAMOTRIGINE 200 MG: 100 TABLET ORAL at 20:19

## 2017-02-02 RX ADMIN — PROMETHAZINE HYDROCHLORIDE 25 MG: 25 TABLET ORAL at 00:25

## 2017-02-02 RX ADMIN — NITROGLYCERIN 0.4 MG: 0.4 TABLET SUBLINGUAL at 08:57

## 2017-02-02 RX ADMIN — TRIAMTERENE AND HYDROCHLOROTHIAZIDE 0.5 TABLET: 75; 50 TABLET ORAL at 08:50

## 2017-02-02 RX ADMIN — METHOCARBAMOL 750 MG: 750 TABLET ORAL at 20:25

## 2017-02-02 RX ADMIN — LEVETIRACETAM 2000 MG: 500 TABLET, FILM COATED ORAL at 08:49

## 2017-02-02 RX ADMIN — INSULIN LISPRO 3 UNITS: 100 INJECTION, SOLUTION INTRAVENOUS; SUBCUTANEOUS at 20:20

## 2017-02-02 RX ADMIN — HYDRALAZINE HYDROCHLORIDE 25 MG: 25 TABLET ORAL at 08:50

## 2017-02-02 RX ADMIN — Medication 1 PACKET: at 20:19

## 2017-02-02 RX ADMIN — INSULIN DETEMIR 20 UNITS: 100 INJECTION, SOLUTION SUBCUTANEOUS at 20:20

## 2017-02-02 RX ADMIN — METOPROLOL TARTRATE 50 MG: 50 TABLET ORAL at 08:49

## 2017-02-02 RX ADMIN — HYDRALAZINE HYDROCHLORIDE 25 MG: 25 TABLET ORAL at 20:20

## 2017-02-02 RX ADMIN — NITROGLYCERIN 0.4 MG: 0.4 TABLET SUBLINGUAL at 19:26

## 2017-02-02 RX ADMIN — ISOSORBIDE DINITRATE 20 MG: 20 TABLET ORAL at 01:16

## 2017-02-02 RX ADMIN — INSULIN DETEMIR 20 UNITS: 100 INJECTION, SOLUTION SUBCUTANEOUS at 01:18

## 2017-02-02 RX ADMIN — LEVETIRACETAM 2000 MG: 500 TABLET, FILM COATED ORAL at 01:16

## 2017-02-02 RX ADMIN — LISINOPRIL 20 MG: 20 TABLET ORAL at 08:50

## 2017-02-02 RX ADMIN — LAMOTRIGINE 200 MG: 100 TABLET ORAL at 08:51

## 2017-02-02 RX ADMIN — METHOCARBAMOL 750 MG: 750 TABLET ORAL at 08:51

## 2017-02-02 RX ADMIN — Medication 1 CAPSULE: at 08:51

## 2017-02-02 RX ADMIN — COLCHICINE 0.6 MG: 0.6 TABLET, FILM COATED ORAL at 11:04

## 2017-02-02 RX ADMIN — ASPIRIN 81 MG 81 MG: 81 TABLET ORAL at 10:20

## 2017-02-02 RX ADMIN — ISOSORBIDE DINITRATE 20 MG: 20 TABLET ORAL at 20:20

## 2017-02-02 RX ADMIN — GLIPIZIDE 10 MG: 10 TABLET ORAL at 17:56

## 2017-02-02 RX ADMIN — COLCHICINE 0.6 MG: 0.6 TABLET, FILM COATED ORAL at 20:20

## 2017-02-02 RX ADMIN — METHOCARBAMOL 750 MG: 750 TABLET ORAL at 11:07

## 2017-02-02 NOTE — PAYOR COMM NOTE
"[facility]  [place]AN[place],   [phone]  OR FAX  [phone]  Carlos A Boyer  (56 y.o. Male)     Date of Birth Social Security Number Address Home Phone MRN    1960  [address] [place] [id number] [phone] [id number]    Protestant Marital Status          Scientology        Admission Date Admission Type Admitting Provider Attending Provider Department, Room/Bed    2/1/17 Emergency Wade Ramey MD Bose, Sanjay, MD [facility] 4B, 448/1    Discharge Date Discharge Disposition Discharge Destination                      Attending Provider: Wade Ramey MD     Allergies:  Atorvastatin, Flagyl [Metronidazole], Ciprofloxacin    Isolation:  None   Infection:  None   Code Status:  FULL    Ht:  72\" (182.9 cm)   Wt:  245 lb (111 kg)    Admission Cmt:  None   Principal Problem:  Chest pain, unspecified [R07.9]                 Active Insurance as of 2/1/2017     Primary Coverage     Payor Plan Insurance Group Employer/Plan Group    MEDICARE MEDICARE A & B      Payor Plan Address Payor Plan Phone Number Effective From Effective To    [address] [phone] 9/1/2014     [place] [address]       Subscriber Name Subscriber Birth Date Member ID       CARLOS A BOYER JR. 1960 [id number]                 Emergency Contacts      (Rel.) Home Phone Work Phone Mobile Phone    Daniela Boyer (Spouse) [phone] -- [phone]               History & Physical      TEENA Borges at 2/2/2017  9:07 AM          [facility] HISTORY AND PHYSICAL      Patient Care Team:  GINA Felder as PCP - General  GINA Felder as PCP - Family Medicine  Wade Ramey MD as Cardiologist (Cardiology)    Chief complaint: Chest Pain     Subjective     Carlos A Boyer Gloria is a 56 y.o.  male with a known PMH significant for CAD, Type 2 DM, HTN, GERD, who presented to [facility] via ED with complaints of intermittent left sided, non " exertional chest pain with radiation into the neck, with associated nausea, vomiting, and diaphoresis, relieved with SL Nitro and ASA. Patient presented [facility] ED recently on 1/17 with similar complaints- he underwent a stress echo and barium swallow at that time which were read as normal studies. Troponin negative x3. No acute ECG changes.     Review of Systems  Review of Systems   Constitution: Positive for diaphoresis. Negative for fever, weakness and malaise/fatigue.   Cardiovascular: Positive for chest pain (Wrosens with deep inspiration, non exertional). Negative for dyspnea on exertion, irregular heartbeat, leg swelling, near-syncope, orthopnea, palpitations, paroxysmal nocturnal dyspnea and syncope.   Respiratory: Negative for cough, shortness of breath, sleep disturbances due to breathing and sputum production.    Skin: Negative for rash.   Musculoskeletal: Negative for falls.   Gastrointestinal: Negative for bloating, abdominal pain, heartburn, nausea and vomiting.   Neurological: Negative for focal weakness.   Psychiatric/Behavioral: Negative for altered mental status.        History  Past Medical History   Diagnosis Date   • Asthma    • Coronary artery disease    • Diabetes mellitus    • GERD (gastroesophageal reflux disease)    • Hyperlipidemia    • Hypertension    • Kidney stone    • Murmur, heart    • Myocardial infarction    • Seizures    • Stroke      Past Surgical History   Procedure Laterality Date   • Hernia repair     • Kidney stone surgery     • Cardiac catheterization  01/2016     Dr. Broadbent; widely patent previously placed stents in the left anterior descending and obstructive disease involving the diagonal branch which was treated medically   • Coronary stent placement     • Coronary angioplasty       Family History   Problem Relation Age of Onset   • Heart disease Father    • Heart disease Maternal Grandmother      Social History   Substance Use Topics   • Smoking status: Former Smoker    • Smokeless tobacco: None   • Alcohol use No       Medications  Prior to Admission medications    Medication Sig Start Date End Date Taking? Authorizing Provider   albuterol (PROVENTIL HFA;VENTOLIN HFA) 108 (90 BASE) MCG/ACT inhaler Inhale 2 puffs Every 6 (Six) Hours As Needed for wheezing.    Historical Provider, MD   ammonium lactate (AMLACTIN) 12 % cream Apply 1 application topically 2 (Two) Times a Day As Needed for dry skin.    Historical Provider, MD   carboxymethylcellulose (REFRESH PLUS) 0.5 % solution 1 drop 4 (Four) Times a Day As Needed for dry eyes.    Historical Provider, MD   clopidogrel (PLAVIX) 75 MG tablet Take 75 mg by mouth Daily.    Historical Provider, MD   etodolac (LODINE) 400 MG tablet Take 400 mg by mouth 2 (Two) Times a Day.    Historical Provider, MD   FLUTICASONE PROPIONATE, NASAL, NA 50 mcg into each nostril Daily. 2 SPRAYS IN EACH NOSTRIL    Historical Provider, MD   glipiZIDE (GLUCOTROL) 10 MG tablet Take 20 mg by mouth 2 (Two) Times a Day Before Meals.    Historical Provider, MD   hydrALAZINE (APRESOLINE) 25 MG tablet Take 25 mg by mouth 2 (Two) Times a Day.    Historical Provider, MD   insulin glargine (LANTUS) 100 UNIT/ML injection Inject 20 Units under the skin Daily.    Historical Provider, MD   isosorbide dinitrate (ISORDIL) 20 MG tablet Take 1 tablet by mouth 2 (Two) Times a Day. 10/18/16   TEENA Garcia   ketoconazole (NIZORAL) 2 % cream Apply 1 application topically 2 (Two) Times a Day.    Historical Provider, MD   LACTOBACILLUS PO Take  by mouth Daily.    Historical Provider, MD   lamoTRIgine (LaMICtal) 100 MG tablet Take 200 mg by mouth 2 (Two) Times a Day.    Historical Provider, MD   levETIRAcetam (KEPPRA) 500 MG tablet Take 2,000 mg by mouth 2 (Two) Times a Day.    Historical Provider, MD   lisinopril (PRINIVIL,ZESTRIL) 40 MG tablet Take 0.5 tablets by mouth Every Night. 10/18/16   TEENA Garcia   metFORMIN (GLUCOPHAGE) 1000 MG tablet Take 1,000 mg by  mouth 2 (Two) Times a Day With Meals.    Historical Provider, MD   methocarbamol (ROBAXIN) 750 MG tablet Take 750 mg by mouth 4 (Four) Times a Day As Needed for muscle spasms.    Historical Provider, MD   metoprolol tartrate (LOPRESSOR) 50 MG tablet Take 1 tablet by mouth 2 (Two) Times a Day. 10/18/16   TEENA Garcia   nitroglycerin (NITROSTAT) 0.4 MG SL tablet Place 0.4 mg under the tongue Every 5 (Five) Minutes As Needed for chest pain. Take no more than 3 doses in 15 minutes.    Historical Provider, MD   pantoprazole (PROTONIX) 40 MG EC tablet Take 40 mg by mouth Daily.    Historical Provider, MD   promethazine (PHENERGAN) 50 MG tablet Take 25 mg by mouth Every 6 (Six) Hours As Needed for nausea or vomiting.    Historical Provider, MD   psyllium (METAMUCIL) 58.6 % packet Take  by mouth Daily. 1 TABLESPOON    Historical Provider, MD   rosuvastatin (CRESTOR) 40 MG tablet Take 20 mg by mouth Daily.    Historical Provider, MD   triamterene-hydrochlorothiazide (MAXZIDE) 75-50 MG per tablet Take 0.5 tablets by mouth Daily.    Historical Provider, MD   urea (CARMOL) 40 % ointment Apply  topically Daily.    Historical Provider, MD       Current Facility-Administered Medications   Medication Dose Route Frequency Provider Last Rate Last Dose   • acetaminophen (TYLENOL) tablet 650 mg  650 mg Oral Q6H PRN Cyril Sue MD       • albuterol (PROVENTIL) nebulizer solution 0.083% 2.5 mg/3mL  2.5 mg Nebulization Q6H PRN Cyril Sue MD       • ammonium lactate (AMLACTIN) 12 % cream   Topical BID PRN Cyril Sue MD       • aspirin chewable tablet 81 mg  81 mg Oral Daily TEENA Borges       • clopidogrel (PLAVIX) tablet 75 mg  75 mg Oral Daily Cyril Sue MD   75 mg at 02/02/17 0849   • colchicine tablet 0.6 mg  0.6 mg Oral Q12H TEENA Borges       • dextrose (D50W) solution 25 g  25 g Intravenous Q15 Min PRN Cyril Sue MD       • dextrose (GLUTOSE) oral gel 15 g  15 g Oral Q15 Min PRN Cyril VAUGHN  MD Vikram       • fluticasone (FLONASE) 50 MCG/ACT nasal spray 2 spray  2 spray Each Nare Daily Cyril uSe MD   2 spray at 02/02/17 0852   • glipiZIDE (GLUCOTROL) tablet 10 mg  10 mg Oral BID AC Cyril Sue MD   10 mg at 02/02/17 0848   • glucagon (human recombinant) (GLUCAGEN DIAGNOSTIC) injection 1 mg  1 mg Subcutaneous Q15 Min PRN Cyril Sue MD       • hydrALAZINE (APRESOLINE) tablet 25 mg  25 mg Oral Q12H Cyril Sue MD   25 mg at 02/02/17 0850   • insulin detemir (LEVEMIR) injection 20 Units  20 Units Subcutaneous Nightly Cyril Sue MD   20 Units at 02/02/17 0118   • insulin lispro (humaLOG) injection 2-7 Units  2-7 Units Subcutaneous 4x Daily AC & at Bedtime Cyril Sue MD   2 Units at 02/02/17 0120   • isosorbide dinitrate (ISORDIL) tablet 20 mg  20 mg Oral Q12H Cyril Sue MD   20 mg at 02/02/17 0850   • ketoconazole (NIZORAL) 2 % cream   Topical Q12H Cyril Sue MD       • lactobacillus acidophilus (RISAQUAD) capsule 1 capsule  1 capsule Oral Daily Cyril Sue MD   1 capsule at 02/02/17 0851   • lamoTRIgine (LaMICtal) tablet 200 mg  200 mg Oral Q12H Cyril Sue MD   200 mg at 02/02/17 0851   • levETIRAcetam (KEPPRA) tablet 2,000 mg  2,000 mg Oral Q12H Cyril Sue MD   2,000 mg at 02/02/17 0849   • lisinopril (PRINIVIL,ZESTRIL) tablet 20 mg  20 mg Oral Q24H Cyril Sue MD   20 mg at 02/02/17 0850   • methocarbamol (ROBAXIN) tablet 750 mg  750 mg Oral 4x Daily Cyril Sue MD   750 mg at 02/02/17 0851   • metoprolol tartrate (LOPRESSOR) tablet 50 mg  50 mg Oral Q12H Cyril Sue MD   50 mg at 02/02/17 0849   • nitroglycerin (NITROSTAT) SL tablet 0.4 mg  0.4 mg Sublingual Q5 Min PRN Cyril Sue MD   0.4 mg at 02/02/17 0857   • pantoprazole (PROTONIX) EC tablet 40 mg  40 mg Oral Q AM Cyril Sue MD   40 mg at 02/02/17 0525   • polyvinyl alcohol-povidone (HYPOTEARS) 1.4-0.6 % ophthalmic solution 1 drop  1 drop Both Eyes 4x Daily PRN Cyril Sue MD       • promethazine  (PHENERGAN) tablet 25 mg  25 mg Oral Q6H PRN Cyril Sue MD   25 mg at 02/02/17 0025   • psyllium (METAMUCIL MULTIHEALTH FIBER) 58.12 % packet 1 packet  1 packet Oral Daily Cyril Sue MD   1 packet at 02/02/17 0851   • rosuvastatin (CRESTOR) tablet 40 mg  40 mg Oral Nightly Wade Ramey MD   40 mg at 02/02/17 0220   • triamterene-hydrochlorothiazide (MAXZIDE) 75-50 MG per tablet 0.5 tablet  0.5 tablet Oral Daily Cyril Sue MD   0.5 tablet at 02/02/17 0850        Allergies:  Atorvastatin; Flagyl [metronidazole]; and Ciprofloxacin    Objective     Vital Signs  Temp:  [97.6 °F (36.4 °C)-98.2 °F (36.8 °C)] 98.2 °F (36.8 °C)  Heart Rate:  [57-75] 74  Resp:  [13-22] 18  BP: (111-155)/() 131/77    Labs  Lab Results   Component Value Date    WBC 6.53 02/01/2017    HGB 13.3 (L) 02/01/2017    HCT 40.2 02/01/2017    MCV 90.1 02/01/2017     (L) 02/01/2017     Lab Results   Component Value Date    GLUCOSE 178 (H) 02/01/2017    CALCIUM 9.4 02/01/2017     (L) 02/01/2017    K 5.4 (H) 02/01/2017    CO2 22.0 (L) 02/01/2017     02/01/2017    BUN 18 02/01/2017    CREATININE 1.35 02/01/2017    EGFRIFNONA 54 (L) 02/01/2017    BCR 13.3 02/01/2017    ANIONGAP 12.0 02/01/2017     Lab Results   Component Value Date    CKTOTAL 92 01/17/2017    CKMB 1.37 01/17/2017    TROPONINI <0.012 02/01/2017     Lab Results   Component Value Date    CHOL 191 02/02/2017    CHOL 138 10/16/2016     Lab Results   Component Value Date    TRIG 247 (H) 02/02/2017    TRIG 198 (H) 10/16/2016    TRIG 143 01/07/2016     Lab Results   Component Value Date    HDL 45 02/02/2017    HDL 25 (L) 10/16/2016    HDL 32 01/07/2016       · 1/17/2017- Stress Echo: All left ventricular wall segments contract normally.  · Left ventricular function is normal. Estimated EF = 55%.          Normal stress echo with no significant echocardiographic evidence for myocardial ischemia.    1/17/2017- Barium Swallow: Normal barium swallow  series.        Physical Exam:  Physical Exam   Constitutional: He is oriented to person, place, and time. He appears well-developed and well-nourished. He is cooperative. No distress.   HENT:   Head: Normocephalic and atraumatic.   Neck: Carotid bruit is not present.   Cardiovascular: Normal rate, regular rhythm, S1 normal, S2 normal, normal heart sounds and intact distal pulses.    No murmur heard.  Pulmonary/Chest: Effort normal and breath sounds normal. No respiratory distress. He exhibits tenderness.   Abdominal: Soft. Normal appearance and bowel sounds are normal. He exhibits no distension. There is no tenderness.   Musculoskeletal: He exhibits no edema.   Neurological: He is alert and oriented to person, place, and time.   Skin: Skin is warm and dry. No rash noted. He is not diaphoretic.   Psychiatric: He has a normal mood and affect. His speech is normal and behavior is normal.   Vitals reviewed.      Results Review:    I reviewed the patient's new clinical results.  I personally viewed and interpreted the patient's EKG/Telemetry data    Assessment     Principal Problem:    -Chest pain, unspecified with recent low risk stress echo and normal barium swallow on 1/17/17. Most recent cardiac cath was 1/7/16 per Dr. Broadbent, which revealed widely patent previously placed stents in the left anterior descending.  Obstructive disease involving the diagonal branch, which was noted to be chronic and has been treated medically. Preserved left systolic function; ejection fraction of 65%. No significant aortic valve stenosis or mitral valve regurgitation. Overall clinical picture is consistent with chest wall pain rather than from a cardiac etiology.      Active Problems:    -Coronary artery disease involving native coronary artery of native heart with unspecified angina pectoris    -Type 2 diabetes mellitus without complication    -Gastroesophageal reflux disease without esophagitis    -Essential hypertension     -Seizure disorder- continue Lamictal and Keppra     -Mixed hyperlipidemia- on Statin therapy with Crestor     -Hyperkalemia     Plan   1. Monitor telemetry  2. Continue to optimize medical therapy-  Plavix, Imdur, Lisinopril, Lopressor, Crestor   3. Discontinue scheduled Ibuprofen.  4. Start ASA 81mg PO daily   5. Start Colchicine 0.6mg PO BID   6. Healthy Heart Diabetic Diet   7. Repeat BMP   8. Consider cardiac cath in the am. Will defer to Dr. Ramey upon his evaluation of the patient.     I discussed the patients findings and my recommendations with patient, nursing staff and Dr. Ramey. .     TEENA Borges  02/02/17  10:17 AM      Please note this cardiology history and physical note is the result of a face to face consultation with the patient, in addition to reviewing medical records at length by myself, TEENA Simental.     Time: More than 50% of time spent in counseling and coordination of care:  Total face-to-face/floor time 45 min.  Time spent in counseling 25 min. Counseling included the following topics: lab results, ECG/telemetry, reviewing the PMH at length, assessment, discussing the plan of care with the patient, nurse, and Karoline Peña.          Electronically signed by TEENA Borges at 2/2/2017 10:21 AM           Emergency Department Notes      TEENA Freeman at 2/1/2017  7:59 PM          Subjective    is a 56-year-old white male presents emergency Department with complaints of chest pain.  He reports intermittent chest pain for the past 2 days.  He states that he has had palpitations within this time.  Patient reports nausea with vomiting as well as diaphoresis.  He reports that his chest pain is approximally an 8 out of 10 at this time.  Patient has had some shortness of breath with the pain.  Patient reports any sort of activity seems to make it worse.  He states walking makes it much worse.  Patient reports these taken an aspirin, 2 nitroglycerin, and Rolaids  without relief.  He does have some reproducible chest pain to the left breast.  He states that his breast is somewhat sore.  Patient actually has an appointment with Dr. Ramey on February 8 for follow-up.  Patient was previously followed by Dr. Broadbent with cardiology.  Patient's most recent heart cardiac cath was 01/07/2016.  On this cath records report that patient had a widely patent stent that was previously placed in the left anterior descending.  He had a new stent placed on this date to the left circumflex coronary artery.  Patient was evaluated in this emergency department on 01/17/2017 and had 2 negative cardiac markers.  He also had a negative stress echo performed.  He also had a barium swallow study that was negative as well.  As mentioned he was due to follow up with Dr. Ramey in 2 weeks.  Patient states that the appointment got changed and he will not be seeing Dr. Ramey to February 8.  Due to symptoms described he elected come the [facility] department for evaluation treatment.  Patient is a 56 y.o. male presenting with chest pain.   Chest Pain   Pain location:  L chest  Pain quality: sharp    Pain radiates to:  Neck  Pain severity:  Moderate  Duration:  2 days  Timing:  Intermittent  Progression:  Worsening  Context: at rest    Context: not breathing    Relieved by:  Nothing  Worsened by:  Nothing  Ineffective treatments:  Nitroglycerin  Associated symptoms: diaphoresis, dizziness, nausea, palpitations and shortness of breath    Associated symptoms: no abdominal pain, no AICD problem, no altered mental status, no anorexia, no anxiety, no back pain, no claudication, no dysphagia, no fatigue, no fever, no headache, no heartburn, no numbness, no orthopnea and no vomiting    Risk factors: coronary artery disease, diabetes mellitus, high cholesterol, hypertension, male sex and obesity        Review of Systems   Constitutional: Positive for diaphoresis. Negative for appetite change, chills, fatigue and fever.    HENT: Negative for congestion, sore throat and trouble swallowing.    Respiratory: Positive for shortness of breath. Negative for chest tightness.    Cardiovascular: Positive for chest pain and palpitations. Negative for orthopnea and claudication.   Gastrointestinal: Positive for nausea. Negative for abdominal distention, abdominal pain, anorexia, heartburn and vomiting.   Genitourinary: Negative for difficulty urinating and dysuria.   Musculoskeletal: Negative for back pain, joint swelling, neck pain and neck stiffness.   Skin: Negative for rash.   Neurological: Positive for dizziness. Negative for numbness and headaches. other systems reviewed and are negative.      Past Medical History   Diagnosis Date   • Asthma    • Coronary artery disease    • Diabetes mellitus    • GERD (gastroesophageal reflux disease)    • Hypertension    • Kidney stone    • Murmur, heart    • Myocardial infarction    • Seizures    • Stroke        Allergies   Allergen Reactions   • Atorvastatin Anaphylaxis   • Flagyl [Metronidazole] Anaphylaxis   • Ciprofloxacin Hives       Past Surgical History   Procedure Laterality Date   • Hernia repair     • Kidney stone surgery     • Cardiac catheterization  01/2016     Dr. Broadbent; widely patent previously placed stents in the left anterior descending and obstructive disease involving the diagonal branch which was treated medically   • Coronary stent placement     • Coronary angioplasty         Family History   Problem Relation Age of Onset   • Heart disease Father    • Heart disease Maternal Grandmother        Social History     Social History   • Marital status:      Spouse name: N/A   • Number of children: N/A   • Years of education: N/A     Social History Main Topics   • Smoking status: Former Smoker   • Smokeless tobacco: None   • Alcohol use No   • Drug use: No   • Sexual activity: Defer     Other Topics Concern   • None     Social History Narrative       Prior to Admission  medications    Medication Sig Start Date End Date Taking? Authorizing Provider   albuterol (PROVENTIL HFA;VENTOLIN HFA) 108 (90 BASE) MCG/ACT inhaler Inhale 2 puffs Every 6 (Six) Hours As Needed for wheezing.    Historical Provider, MD   ammonium lactate (AMLACTIN) 12 % cream Apply 1 application topically 2 (Two) Times a Day As Needed for dry skin.    Historical Provider, MD   carboxymethylcellulose (REFRESH PLUS) 0.5 % solution 1 drop 4 (Four) Times a Day As Needed for dry eyes.    Historical Provider, MD   clopidogrel (PLAVIX) 75 MG tablet Take 75 mg by mouth Daily.    Historical Provider, MD   etodolac (LODINE) 400 MG tablet Take 400 mg by mouth 2 (Two) Times a Day.    Historical Provider, MD   FLUTICASONE PROPIONATE, NASAL, NA 50 mcg into each nostril Daily. 2 SPRAYS IN EACH NOSTRIL    Historical Provider, MD   glipiZIDE (GLUCOTROL) 10 MG tablet Take 20 mg by mouth 2 (Two) Times a Day Before Meals.    Historical Provider, MD   hydrALAZINE (APRESOLINE) 25 MG tablet Take 25 mg by mouth 2 (Two) Times a Day.    Historical Provider, MD   insulin glargine (LANTUS) 100 UNIT/ML injection Inject 20 Units under the skin Daily.    Historical Provider, MD   isosorbide dinitrate (ISORDIL) 20 MG tablet Take 1 tablet by mouth 2 (Two) Times a Day. 10/18/16   TEENA Garcia   ketoconazole (NIZORAL) 2 % cream Apply 1 application topically 2 (Two) Times a Day.    Historical Provider, MD   LACTOBACILLUS PO Take  by mouth Daily.    Historical Provider, MD   lamoTRIgine (LaMICtal) 100 MG tablet Take 200 mg by mouth 2 (Two) Times a Day.    Historical Provider, MD   levETIRAcetam (KEPPRA) 500 MG tablet Take 2,000 mg by mouth 2 (Two) Times a Day.    Historical Provider, MD   lisinopril (PRINIVIL,ZESTRIL) 40 MG tablet Take 0.5 tablets by mouth Every Night. 10/18/16   TEENA Garcia   metFORMIN (GLUCOPHAGE) 1000 MG tablet Take 1,000 mg by mouth 2 (Two) Times a Day With Meals.    Historical Provider, MD   methocarbamol  "(ROBAXIN) 750 MG tablet Take 750 mg by mouth 4 (Four) Times a Day As Needed for muscle spasms.    Historical Provider, MD   metoprolol tartrate (LOPRESSOR) 50 MG tablet Take 1 tablet by mouth 2 (Two) Times a Day. 10/18/16   TEENA Garcia   nitroglycerin (NITROSTAT) 0.4 MG SL tablet Place 0.4 mg under the tongue Every 5 (Five) Minutes As Needed for chest pain. Take no more than 3 doses in 15 minutes.    Historical Provider, MD   pantoprazole (PROTONIX) 40 MG EC tablet Take 40 mg by mouth Daily.    Historical Provider, MD   promethazine (PHENERGAN) 50 MG tablet Take 25 mg by mouth Every 6 (Six) Hours As Needed for nausea or vomiting.    Historical Provider, MD   psyllium (METAMUCIL) 58.6 % packet Take  by mouth Daily. 1 TABLESPOON    Historical Provider, MD   rosuvastatin (CRESTOR) 40 MG tablet Take 20 mg by mouth Daily.    Historical Provider, MD   triamterene-hydrochlorothiazide (MAXZIDE) 75-50 MG per tablet Take 0.5 tablets by mouth Daily.    Historical Provider, MD   urea (CARMOL) 40 % ointment Apply  topically Daily.    Historical Provider, MD       Medications   dextrose (GLUTOSE) oral gel 15 g (not administered)   dextrose (D50W) solution 25 g (not administered)   glucagon (human recombinant) (GLUCAGEN DIAGNOSTIC) injection 1 mg (not administered)   insulin lispro (humaLOG) injection 2-7 Units (not administered)   Morphine sulfate (PF) injection 4 mg (4 mg Intravenous Given 2/1/17 1700)   ondansetron (ZOFRAN) injection 4 mg (4 mg Intravenous Given 2/1/17 1713)   Morphine sulfate (PF) injection 2 mg (2 mg Intravenous Given 2/1/17 2042)   ondansetron (ZOFRAN) injection 4 mg (4 mg Intravenous Given 2/1/17 2042)       Visit Vitals   • /87 (BP Location: Left arm, Patient Position: Lying)   • Pulse 57   • Temp 98.1 °F (36.7 °C) (Temporal Artery )   • Resp 18   • Ht 72\" (182.9 cm)   • Wt 245 lb (111 kg)   • SpO2 96%   • BMI 33.23 kg/m2         Objective   Physical Exam   Constitutional: He is oriented " to person, place, and time. He appears well-developed and well-nourished.   HENT:   Head: Normocephalic and atraumatic.   Right Ear: External ear normal.   Left Ear: External ear normal.   Nose: Nose normal.   Mouth/Throat: Oropharynx is clear and moist.   Eyes: Conjunctivae and EOM are normal. Pupils are equal, round, and reactive to light.   Neck: Normal range of motion. Neck supple.   Cardiovascular: Normal rate, regular rhythm, normal heart sounds and intact distal pulses.    Pulmonary/Chest: Effort normal. He has rales. He exhibits tenderness.   Abdominal: Soft. Bowel sounds are normal.   Musculoskeletal: Normal range of motion.   Neurological: He is alert and oriented to person, place, and time.   Skin: Skin is warm and dry.   Psychiatric: He has a normal mood and affect. His behavior is normal. Judgment and thought content normal. note and vitals reviewed.      Procedures        Lab Results (last 24 hours)     Procedure Component Value Units Date/Time    CBC & Differential [[id number]] Collected:  02/01/17 1656    Specimen:  Blood Updated:  02/01/17 1714    Narrative:       The following orders were created for panel order CBC & Differential.  Procedure                               Abnormality         Status                     ---------                               -----------         ------                     CBC Auto Differential[[id number]]         Abnormal            Final result                 Please view results for these tests on the individual orders.    Comprehensive Metabolic Panel [[id number]]  (Abnormal) Collected:  02/01/17 1656    Specimen:  Blood Updated:  02/01/17 1724     Glucose 178 (H) mg/dL      BUN 18 mg/dL      Creatinine 1.35 mg/dL      Sodium 134 (L) mmol/L      Potassium 5.4 (H) mmol/L      Chloride 100 mmol/L      CO2 22.0 (L) mmol/L      Calcium 9.4 mg/dL      Total Protein 7.5 g/dL      Albumin 4.50 g/dL      ALT (SGPT) 61 (H) U/L      AST (SGOT) 39 U/L      Alkaline Phosphatase 85  U/L      Total Bilirubin 0.7 mg/dL      eGFR Non African Amer 54 (L) mL/min/1.73      Globulin 3.0 gm/dL      A/G Ratio 1.5 g/dL      BUN/Creatinine Ratio 13.3      Anion Gap 12.0 mmol/L     Protime-INR [[id number]]  (Normal) Collected:  02/01/17 1656    Specimen:  Blood Updated:  02/01/17 1834     Protime 13.0 Seconds      INR 0.95     aPTT [[id number]]  (Abnormal) Collected:  02/01/17 1656    Specimen:  Blood Updated:  02/01/17 1808     PTT 23.6 (L) seconds     CBC Auto Differential [[id number]]  (Abnormal) Collected:  02/01/17 1656    Specimen:  Blood Updated:  02/01/17 1714     WBC 6.53 10*3/mm3      RBC 4.46 (L) 10*6/mm3      Hemoglobin 13.3 (L) g/dL      Hematocrit 40.2 %      MCV 90.1 fL      MCH 29.8 pg      MCHC 33.1 g/dL      RDW 12.9 %      RDW-SD 42.0 fl      MPV 11.5 fL      Platelets 127 (L) 10*3/mm3      Neutrophil % 68.1 %      Lymphocyte % 23.0 %      Monocyte % 6.3 %      Eosinophil % 1.4 %      Basophil % 0.3 %      Immature Grans % 0.9 %      Neutrophils, Absolute 4.45 10*3/mm3      Lymphocytes, Absolute 1.50 10*3/mm3      Monocytes, Absolute 0.41 10*3/mm3      Eosinophils, Absolute 0.09 10*3/mm3      Basophils, Absolute 0.02 10*3/mm3      Immature Grans, Absolute 0.06 (H) 10*3/mm3     POC Troponin, Rapid [[id number]]  (Normal) Collected:  02/01/17 1707    Specimen:  Blood Updated:  02/01/17 1721     Troponin I 0.00 ng/mL       Serial Number: [id number]    : [id number]       POC Troponin, Rapid [[id number]]  (Normal) Collected:  02/01/17 2002    Specimen:  Blood Updated:  02/01/17 2016     Troponin I 0.00 ng/mL       Serial Number: [id number]    : [id number]             No orders to display         ED Course  ED Course   Spoke with Dr. Sue. Plan is to admit to Dr. Ramey on cardiology floor.     HEART Score  History: Slightly suspicious (+0)  ECG: Normal (+0)  Age: 45 through 65 (+1)  Risk Factors: 3 or more risk factors OR history of atherosclerotic disease (+2)  Troponin: Normal limit or lower (+0):  3        MDM  Number of Diagnoses or Management Options  Chest pain, unspecified type: new and requires workup     Amount and/or Complexity of Data Reviewed  Clinical lab tests: ordered and reviewed  Tests in the radiology section of CPT®:  ordered and reviewed  Discuss the patient with other providers: yes    Risk of Complications, Morbidity, and/or Mortality  Presenting problems: high  Diagnostic procedures: high  Management options: high    Patient Progress  Patient progress: improved      Final diagnoses:   Chest pain, unspecified type         TEENA Freeman  02/01/17 2300       Electronically signed by TEENA Freeman at 2/1/2017 11:00 PM        Orders (last 24 hrs)     Start     Ordered    02/02/17 1045  colchicine tablet 0.6 mg  Every 12 Hours Scheduled      02/02/17 1012    02/02/17 1030  aspirin chewable tablet 81 mg  Daily      02/02/17 0951    02/02/17 1021  Basic Metabolic Panel  Once      02/02/17 1020    02/02/17 1014  Diet Regular; Cardiac, Consistent Carbohydrate  Diet Effective Now      02/02/17 1014    02/02/17 0918  POC Glucose Fingerstick  Once      02/02/17 0917    02/02/17 0900  lisinopril (PRINIVIL,ZESTRIL) tablet 20 mg  Every 24 Hours Scheduled      02/01/17 2323 02/02/17 0900  fluticasone (FLONASE) 50 MCG/ACT nasal spray 2 spray  Daily      02/01/17 2323 02/02/17 0900  lactobacillus acidophilus (RISAQUAD) capsule 1 capsule  Daily      02/01/17 2323 02/02/17 0900  psyllium (METAMUCIL MULTIHEALTH FIBER) 58.12 % packet 1 packet  Daily      02/01/17 2323 02/02/17 0900  triamterene-hydrochlorothiazide (MAXZIDE) 75-50 MG per tablet 0.5 tablet  Daily      02/01/17 2323 02/02/17 0900  clopidogrel (PLAVIX) tablet 75 mg  Daily      02/01/17 2323 02/02/17 0730  glipiZIDE (GLUCOTROL) tablet 10 mg  2 Times Daily Before Meals      02/01/17 2323 02/02/17 0700  POC Glucose Fingerstick  4 Times Daily Before Meals & at Bedtime      02/01/17 2258    02/02/17 0600  POC  Glucose Fingerstick  Daily      02/01/17 2258    02/02/17 0600  Hemoglobin A1c  Once      02/01/17 2258    02/02/17 0600  Lipid Panel  Morning Draw      02/01/17 2258    02/02/17 0600  pantoprazole (PROTONIX) EC tablet 40 mg  Every Early Morning      02/01/17 2323    02/02/17 0500  ECG 12 Lead  Tomorrow AM      02/01/17 2258    02/02/17 0045  rosuvastatin (CRESTOR) tablet 40 mg  Nightly      02/02/17 0008    02/02/17 0038  POC Glucose Fingerstick  Once      02/02/17 0037    02/02/17 0001  NPO Diet  Diet Effective Midnight,   Status:  Canceled      02/01/17 2258 02/02/17 0000  Vital Signs  Every 8 Hours      02/01/17 2258    02/02/17 0000  ketoconazole (NIZORAL) 2 % cream  Every 12 Hours Scheduled      02/01/17 2323    02/02/17 0000  lamoTRIgine (LaMICtal) tablet 200 mg  Every 12 Hours Scheduled      02/01/17 2323    02/02/17 0000  levETIRAcetam (KEPPRA) tablet 2,000 mg  Every 12 Hours Scheduled      02/01/17 2323    02/02/17 0000  methocarbamol (ROBAXIN) tablet 750 mg  4 Times Daily      02/01/17 2323    02/02/17 0000  metoprolol tartrate (LOPRESSOR) tablet 50 mg  Every 12 Hours Scheduled      02/01/17 2323    02/02/17 0000  ibuprofen (ADVIL,MOTRIN) tablet 400 mg  3 Times Daily,   Status:  Discontinued      02/01/17 2323    02/02/17 0000  isosorbide dinitrate (ISORDIL) tablet 20 mg  Every 12 Hours Scheduled      02/01/17 2323    02/02/17 0000  atorvastatin (LIPITOR) tablet 80 mg  Nightly,   Status:  Discontinued      02/01/17 2323 02/02/17 0000  hydrALAZINE (APRESOLINE) tablet 25 mg  Every 12 Hours Scheduled      02/01/17 2321    02/02/17 0000  insulin detemir (LEVEMIR) injection 20 Units  Nightly      02/01/17 2324    02/01/17 2335  acetaminophen (TYLENOL) tablet 650 mg  Every 6 Hours PRN      02/01/17 2336    02/01/17 2330  insulin lispro (humaLOG) injection 2-7 Units  4 Times Daily Before Meals & Nightly      02/01/17 2251    02/01/17 2322  albuterol (PROVENTIL) nebulizer solution 0.083% 2.5 mg/3mL  Every 6  Hours PRN      02/01/17 2323    02/01/17 2322  nitroglycerin (NITROSTAT) SL tablet 0.4 mg  Every 5 Minutes PRN      02/01/17 2323    02/01/17 2322  promethazine (PHENERGAN) tablet 25 mg  Every 6 Hours PRN      02/01/17 2323    02/01/17 2322  ammonium lactate (AMLACTIN) 12 % cream  2 Times Daily PRN      02/01/17 2323    02/01/17 2322  polyvinyl alcohol-povidone (HYPOTEARS) 1.4-0.6 % ophthalmic solution 1 drop  4 Times Daily PRN      02/01/17 2323    02/01/17 2256  resume home meds and use standard 4B orders  Once     Comments:  resume home meds and use standard 4B orders    02/01/17 2258 02/01/17 2256  Hold home metformin and start ssi insulin humalog  Once     Comments:  Hold home metformin and start ssi insulin humalog    02/01/17 2258 02/01/17 2256  Do NOT Hold Basal Insulin When Patient is NPO, Hold Bolus Dose if NPO  Continuous      02/01/17 2258 02/01/17 2256  Follow [facility] Hypoglycemia Protocol For Blood Glucose Less Than 70 mg/dL  Until Discontinued      02/01/17 2258 02/01/17 2256  Hypoglycemia Treatment - Alert Patient That is Not NPO and Can Safely Swallow  Until Discontinued     Comments:  Administer 4 oz Fruit Juice OR 4 oz Regular Soda OR 8 oz Milk OR 15-30 grams (1 tube) of Glucose Gel  Recheck Blood Glucose 15 Minutes After Ingestion, Repeat Treatment & Continue to Recheck Blood Sugar Every 15 Minutes Until Blood Glucose is 70 or Higher  Once Blood Glucose is 70 or Higher, Give Snack (Peanut Butter & Crackers) if Next Meal Will Be Given in More Than 60 Minutes, Provide Meal Tray As Soon As Possible    02/01/17 2258 02/01/17 2256  Hypoglycemia Treatment - Patient Has IV Access - Unresponsive, NPO or Unable To Safely Swallow  Until Discontinued     Comments:  Administer 25g D50W IV Push (1 Whole Vial)  Recheck Blood Glucose 15 Minutes After Administration, if Blood Glucose Remains Less Than 70, Repeat Treatment   Recheck Blood Glucose 15 Minutes After 2nd Administration, if Blood Glucose  Remains Less Than 70 After 2nd Dose of D50W Contact Provider for Further Treatment Orders & Consider Adding IVF With D5 for Maintenance    02/01/17 2258    02/01/17 2256  Hypoglycemia Treatment - Patient Without IV Access - Unresponsive, NPO or Unable To Safely Swallow  Until Discontinued     Comments:  Administer 1mg Glucagon SQ & Establish IV Access  Turn Patient on Side - Nausea / Vomiting May Occur  Recheck Blood Glucose 15 Minutes After Administration  If IV Access Has Not Been Established & Blood Glucose Remains Less Than 70, Repeat Treatment With Glucagon  If IV Access Established, Administer 25g D50W IV Push (1 Whole Vial)  Recheck Blood Glucose 15 Minutes After Administration of 2nd Medication, if Blood Glucose Remains Less Than 70, Contact Provider for Further Treatment Orders & Consider Adding IVF With D5 for Maintenance    02/01/17 2258 02/01/17 2256  Full Code  Continuous      02/01/17 2258 02/01/17 2256  Urinalysis With / Microscopic If Indicated  Once      02/01/17 2258 02/01/17 2256  Troponin  STAT      02/01/17 2258 02/01/17 2255  dextrose (GLUTOSE) oral gel 15 g  Every 15 Minutes PRN      02/01/17 2258    02/01/17 2255  dextrose (D50W) solution 25 g  Every 15 Minutes PRN      02/01/17 2258    02/01/17 2255  glucagon (human recombinant) (GLUCAGEN DIAGNOSTIC) injection 1 mg  Every 15 Minutes PRN      02/01/17 2258    02/01/17 2112  Initiate Observation Status  Once      02/01/17 2112 02/01/17 2024  Morphine sulfate (PF) injection 2 mg  Once      02/01/17 2022 02/01/17 2024  ondansetron (ZOFRAN) injection 4 mg  Once      02/01/17 2022 02/01/17 2017  POC Troponin, Rapid  Once      02/01/17 2016    02/01/17 1722  POC Troponin, Rapid  Once      02/01/17 1721    02/01/17 1703  Morphine sulfate (PF) injection 4 mg  Once      02/01/17 1701 02/01/17 1703  ondansetron (ZOFRAN) injection 4 mg  Once      02/01/17 1701    02/01/17 1655  POCT Troponin, rapid  Now Then Every 3 Hours       02/01/17 1654    02/01/17 1654  CBC & Differential  Once      02/01/17 1654    02/01/17 1654  Comprehensive Metabolic Panel  Once      02/01/17 1654    02/01/17 1654  Protime-INR  Once      02/01/17 1654    02/01/17 1654  aPTT  Once      02/01/17 1654    02/01/17 1654  ECG 12 Lead  Now & in 3 Hours      02/01/17 1654    02/01/17 1654  CBC Auto Differential  PROCEDURE ONCE      02/01/17 1654    02/01/17 1644  ECG 12 Lead  STAT      02/01/17 1643    Unscheduled  Oxygen Therapy- Nasal Cannula; 2 LPM  As Needed      02/01/17 2331          Physician Progress Notes (last 24 hours) (Notes from 2/1/2017 12:55 PM through 2/2/2017 12:55 PM)     No notes of this type exist for this encounter.

## 2017-02-02 NOTE — PLAN OF CARE
Problem: Patient Care Overview (Adult)  Goal: Plan of Care Review  Outcome: Ongoing (interventions implemented as appropriate)    02/02/17 1518   Coping/Psychosocial Response Interventions   Plan Of Care Reviewed With patient   Patient Care Overview   Progress progress toward functional goals as expected   Outcome Evaluation   Outcome Summary/Follow up Plan pt c/o chest pain at 8. Nitro SL given with moderate relief. NP believes pain is chest wall origin. no other complaints        Goal: Adult Individualization and Mutuality  Outcome: Ongoing (interventions implemented as appropriate)  Goal: Discharge Needs Assessment  Outcome: Ongoing (interventions implemented as appropriate)    Problem: Acute Coronary Syndrome (ACS) (Adult)  Goal: Signs and Symptoms of Listed Potential Problems Will be Absent or Manageable (Acute Coronary Syndrome)  Outcome: Ongoing (interventions implemented as appropriate)

## 2017-02-02 NOTE — H&P
[facility] HEART GROUP HISTORY AND PHYSICAL      Patient Care Team:  GINA Felder as PCP - General  GINA Felder as PCP - Family Medicine  Wade Ramey MD as Cardiologist (Cardiology)    Chief complaint: Chest Pain     Subjective     Carlos A Boyer Jr. is a 56 y.o.  male with a known PMH significant for CAD, Type 2 DM, HTN, GERD, who presented to [facility] via ED with complaints of intermittent left sided, non exertional chest pain with radiation into the neck, with associated nausea, vomiting, and diaphoresis, relieved with SL Nitro and ASA. Patient presented to [facility] ED recently on 1/17 with similar complaints- he underwent a stress echo and barium swallow at that time which were read as normal studies. Troponin negative x3. No acute ECG changes.     Review of Systems  Review of Systems   Constitution: Positive for diaphoresis. Negative for fever, weakness and malaise/fatigue.   Cardiovascular: Positive for chest pain ([place]rosens with deep inspiration, non exertional). Negative for dyspnea on exertion, irregular heartbeat, leg swelling, near-syncope, orthopnea, palpitations, paroxysmal nocturnal dyspnea and syncope.   Respiratory: Negative for cough, shortness of breath, sleep disturbances due to breathing and sputum production.    Skin: Negative for rash.   Musculoskeletal: Negative for falls.   Gastrointestinal: Negative for bloating, abdominal pain, heartburn, nausea and vomiting.   Neurological: Negative for focal weakness.   Psychiatric/Behavioral: Negative for altered mental status.        History  Past Medical History   Diagnosis Date   • Asthma    • Coronary artery disease    • Diabetes mellitus    • GERD (gastroesophageal reflux disease)    • Hyperlipidemia    • Hypertension    • Kidney stone    • Murmur, heart    • Myocardial infarction    • Seizures    • Stroke      Past Surgical History   Procedure Laterality Date   • Hernia repair     • Kidney stone surgery     • Cardiac  catheterization  01/2016     Dr. Broadbent; widely patent previously placed stents in the left anterior descending and obstructive disease involving the diagonal branch which was treated medically   • Coronary stent placement     • Coronary angioplasty       Family History   Problem Relation Age of Onset   • Heart disease Father    • Heart disease Maternal Grandmother      Social History   Substance Use Topics   • Smoking status: Former Smoker   • Smokeless tobacco: None   • Alcohol use No       Medications  Prior to Admission medications    Medication Sig Start Date End Date Taking? Authorizing Provider   albuterol (PROVENTIL HFA;VENTOLIN HFA) 108 (90 BASE) MCG/ACT inhaler Inhale 2 puffs Every 6 (Six) Hours As Needed for wheezing.    Historical Provider, MD   ammonium lactate (AMLACTIN) 12 % cream Apply 1 application topically 2 (Two) Times a Day As Needed for dry skin.    Historical Provider, MD   carboxymethylcellulose (REFRESH PLUS) 0.5 % solution 1 drop 4 (Four) Times a Day As Needed for dry eyes.    Historical Provider, MD   clopidogrel (PLAVIX) 75 MG tablet Take 75 mg by mouth Daily.    Historical Provider, MD   etodolac (LODINE) 400 MG tablet Take 400 mg by mouth 2 (Two) Times a Day.    Historical Provider, MD   FLUTICASONE PROPIONATE, NASAL, NA 50 mcg into each nostril Daily. 2 SPRAYS IN EACH NOSTRIL    Historical Provider, MD   glipiZIDE (GLUCOTROL) 10 MG tablet Take 20 mg by mouth 2 (Two) Times a Day Before Meals.    Historical Provider, MD   hydrALAZINE (APRESOLINE) 25 MG tablet Take 25 mg by mouth 2 (Two) Times a Day.    Historical Provider, MD   insulin glargine (LANTUS) 100 UNIT/ML injection Inject 20 Units under the skin Daily.    Historical Provider, MD   isosorbide dinitrate (ISORDIL) 20 MG tablet Take 1 tablet by mouth 2 (Two) Times a Day. 10/18/16   TEENA Garcia   ketoconazole (NIZORAL) 2 % cream Apply 1 application topically 2 (Two) Times a Day.    Historical Provider, MD    LACTOBACILLUS PO Take  by mouth Daily.    Historical Provider, MD   lamoTRIgine (LaMICtal) 100 MG tablet Take 200 mg by mouth 2 (Two) Times a Day.    Historical Provider, MD   levETIRAcetam (KEPPRA) 500 MG tablet Take 2,000 mg by mouth 2 (Two) Times a Day.    Historical Provider, MD   lisinopril (PRINIVIL,ZESTRIL) 40 MG tablet Take 0.5 tablets by mouth Every Night. 10/18/16   TEENA Garcia   metFORMIN (GLUCOPHAGE) 1000 MG tablet Take 1,000 mg by mouth 2 (Two) Times a Day With Meals.    Historical Provider, MD   methocarbamol (ROBAXIN) 750 MG tablet Take 750 mg by mouth 4 (Four) Times a Day As Needed for muscle spasms.    Historical Provider, MD   metoprolol tartrate (LOPRESSOR) 50 MG tablet Take 1 tablet by mouth 2 (Two) Times a Day. 10/18/16   TEENA Garcia   nitroglycerin (NITROSTAT) 0.4 MG SL tablet Place 0.4 mg under the tongue Every 5 (Five) Minutes As Needed for chest pain. Take no more than 3 doses in 15 minutes.    Historical Provider, MD   pantoprazole (PROTONIX) 40 MG EC tablet Take 40 mg by mouth Daily.    Historical Provider, MD   promethazine (PHENERGAN) 50 MG tablet Take 25 mg by mouth Every 6 (Six) Hours As Needed for nausea or vomiting.    Historical Provider, MD   psyllium (METAMUCIL) 58.6 % packet Take  by mouth Daily. 1 TABLESPOON    Historical Provider, MD   rosuvastatin (CRESTOR) 40 MG tablet Take 20 mg by mouth Daily.    Historical Provider, MD   triamterene-hydrochlorothiazide (MAXZIDE) 75-50 MG per tablet Take 0.5 tablets by mouth Daily.    Historical Provider, MD   urea (CARMOL) 40 % ointment Apply  topically Daily.    Historical Provider, MD       Current Facility-Administered Medications   Medication Dose Route Frequency Provider Last Rate Last Dose   • acetaminophen (TYLENOL) tablet 650 mg  650 mg Oral Q6H PRN Cyril Sue MD       • albuterol (PROVENTIL) nebulizer solution 0.083% 2.5 mg/3mL  2.5 mg Nebulization Q6H PRN Cyril Sue MD       • ammonium lactate  (AMLACTIN) 12 % cream   Topical BID PRN Cyril Sue MD       • aspirin chewable tablet 81 mg  81 mg Oral Daily TEENA Borges       • clopidogrel (PLAVIX) tablet 75 mg  75 mg Oral Daily Cyril Sue MD   75 mg at 02/02/17 0849   • colchicine tablet 0.6 mg  0.6 mg Oral Q12H TEENA Borges       • dextrose (D50W) solution 25 g  25 g Intravenous Q15 Min PRN Cyril Sue MD       • dextrose (GLUTOSE) oral gel 15 g  15 g Oral Q15 Min PRN Cyril Sue MD       • fluticasone (FLONASE) 50 MCG/ACT nasal spray 2 spray  2 spray Each Nare Daily Cyril Sue MD   2 spray at 02/02/17 0852   • glipiZIDE (GLUCOTROL) tablet 10 mg  10 mg Oral BID AC Cyril Sue MD   10 mg at 02/02/17 0848   • glucagon (human recombinant) (GLUCAGEN DIAGNOSTIC) injection 1 mg  1 mg Subcutaneous Q15 Min PRN Cyril Sue MD       • hydrALAZINE (APRESOLINE) tablet 25 mg  25 mg Oral Q12H Cyril Sue MD   25 mg at 02/02/17 0850   • insulin detemir (LEVEMIR) injection 20 Units  20 Units Subcutaneous Nightly Cyril Sue MD   20 Units at 02/02/17 0118   • insulin lispro (humaLOG) injection 2-7 Units  2-7 Units Subcutaneous 4x Daily AC & at Bedtime Cyril Sue MD   2 Units at 02/02/17 0120   • isosorbide dinitrate (ISORDIL) tablet 20 mg  20 mg Oral Q12H Cyril Sue MD   20 mg at 02/02/17 0850   • ketoconazole (NIZORAL) 2 % cream   Topical Q12H Cyril Sue MD       • lactobacillus acidophilus (RISAQUAD) capsule 1 capsule  1 capsule Oral Daily Cyril Sue MD   1 capsule at 02/02/17 0851   • lamoTRIgine (LaMICtal) tablet 200 mg  200 mg Oral Q12H Cyril Sue MD   200 mg at 02/02/17 0851   • levETIRAcetam (KEPPRA) tablet 2,000 mg  2,000 mg Oral Q12H Cyril Sue MD   2,000 mg at 02/02/17 0849   • lisinopril (PRINIVIL,ZESTRIL) tablet 20 mg  20 mg Oral Q24H Cyril Sue MD   20 mg at 02/02/17 0850   • methocarbamol (ROBAXIN) tablet 750 mg  750 mg Oral 4x Daily Cyril Sue MD   750 mg at 02/02/17 0851   • metoprolol tartrate  (LOPRESSOR) tablet 50 mg  50 mg Oral Q12H Cyril Sue MD   50 mg at 02/02/17 0849   • nitroglycerin (NITROSTAT) SL tablet 0.4 mg  0.4 mg Sublingual Q5 Min PRN Cyril Sue MD   0.4 mg at 02/02/17 0857   • pantoprazole (PROTONIX) EC tablet 40 mg  40 mg Oral Q AM Cyril Sue MD   40 mg at 02/02/17 0525   • polyvinyl alcohol-povidone (HYPOTEARS) 1.4-0.6 % ophthalmic solution 1 drop  1 drop Both Eyes 4x Daily PRN Cyril Sue MD       • promethazine (PHENERGAN) tablet 25 mg  25 mg Oral Q6H PRN Cyril Sue MD   25 mg at 02/02/17 0025   • psyllium (METAMUCIL MULTIHEALTH FIBER) 58.12 % packet 1 packet  1 packet Oral Daily Cyril Sue MD   1 packet at 02/02/17 0851   • rosuvastatin (CRESTOR) tablet 40 mg  40 mg Oral Nightly Wade Ramey MD   40 mg at 02/02/17 0220   • triamterene-hydrochlorothiazide (MAXZIDE) 75-50 MG per tablet 0.5 tablet  0.5 tablet Oral Daily Cyril Sue MD   0.5 tablet at 02/02/17 0850        Allergies:  Atorvastatin; Flagyl [metronidazole]; and Ciprofloxacin    Objective     Vital Signs  Temp:  [97.6 °F (36.4 °C)-98.2 °F (36.8 °C)] 98.2 °F (36.8 °C)  Heart Rate:  [57-75] 74  Resp:  [13-22] 18  BP: (111-155)/() 131/77    Labs  Lab Results   Component Value Date    WBC 6.53 02/01/2017    HGB 13.3 (L) 02/01/2017    HCT 40.2 02/01/2017    MCV 90.1 02/01/2017     (L) 02/01/2017     Lab Results   Component Value Date    GLUCOSE 178 (H) 02/01/2017    CALCIUM 9.4 02/01/2017     (L) 02/01/2017    K 5.4 (H) 02/01/2017    CO2 22.0 (L) 02/01/2017     02/01/2017    BUN 18 02/01/2017    CREATININE 1.35 02/01/2017    EGFRIFNONA 54 (L) 02/01/2017    BCR 13.3 02/01/2017    ANIONGAP 12.0 02/01/2017     Lab Results   Component Value Date    CKTOTAL 92 01/17/2017    CKMB 1.37 01/17/2017    TROPONINI <0.012 02/01/2017     Lab Results   Component Value Date    CHOL 191 02/02/2017    CHOL 138 10/16/2016     Lab Results   Component Value Date    TRIG 247 (H) 02/02/2017    TRIG 198 (H)  10/16/2016    TRIG 143 01/07/2016     Lab Results   Component Value Date    HDL 45 02/02/2017    HDL 25 (L) 10/16/2016    HDL 32 01/07/2016       · 1/17/2017- Stress Echo: All left ventricular wall segments contract normally.  · Left ventricular function is normal. Estimated EF = 55%.          Normal stress echo with no significant echocardiographic evidence for myocardial ischemia.    1/17/2017- Barium Swallow: Normal barium swallow series.        Physical Exam:  Physical Exam   Constitutional: He is oriented to person, place, and time. He appears well-developed and well-nourished. He is cooperative. No distress.   HENT:   Head: Normocephalic and atraumatic.   Neck: Carotid bruit is not present.   Cardiovascular: Normal rate, regular rhythm, S1 normal, S2 normal, normal heart sounds and intact distal pulses.    No murmur heard.  Pulmonary/Chest: Effort normal and breath sounds normal. No respiratory distress. He exhibits tenderness.   Abdominal: Soft. Normal appearance and bowel sounds are normal. He exhibits no distension. There is no tenderness.   Musculoskeletal: He exhibits no edema.   Neurological: He is alert and oriented to person, place, and time.   Skin: Skin is warm and dry. No rash noted. He is not diaphoretic.   Psychiatric: He has a normal mood and affect. His speech is normal and behavior is normal.   Vitals reviewed.      Results Review:    I reviewed the patient's new clinical results.  I personally viewed and interpreted the patient's EKG/Telemetry data    Assessment     Principal Problem:    -Chest pain, unspecified with recent low risk stress echo and normal barium swallow on 1/17/17. Most recent cardiac cath was 1/7/16 per Dr. Broadbent, which revealed widely patent previously placed stents in the left anterior descending.  Obstructive disease involving the diagonal branch, which was noted to be chronic and has been treated medically. Preserved left systolic function; ejection fraction of 65%.  No significant aortic valve stenosis or mitral valve regurgitation. Overall clinical picture is consistent with chest wall pain rather than from a cardiac etiology.      Active Problems:    -Coronary artery disease involving native coronary artery of native heart with unspecified angina pectoris    -Type 2 diabetes mellitus without complication    -Gastroesophageal reflux disease without esophagitis    -Essential hypertension    -Seizure disorder- continue Lamictal and Keppra     -Mixed hyperlipidemia- on Statin therapy with Crestor     -Hyperkalemia     Plan   1. Monitor telemetry  2. Continue to optimize medical therapy-  Plavix, Imdur, Lisinopril, Lopressor, Crestor   3. Discontinue scheduled Ibuprofen.  4. Start ASA 81mg PO daily   5. Start Colchicine 0.6mg PO BID   6. Healthy Heart Diabetic Diet   7. Repeat BMP   8. Consider cardiac cath in the am. Will defer to Dr. Ramey upon his evaluation of the patient.     I discussed the patients findings and my recommendations with patient, nursing staff and Dr. Ramey. .     TEENA Borges  02/02/17  10:17 AM      Please note this cardiology history and physical note is the result of a face to face consultation with the patient, in addition to reviewing medical records at length by myself, TEENA Simental.     Time: More than 50% of time spent in counseling and coordination of care:  Total face-to-face/floor time 45 min.  Time spent in counseling 25 min. Counseling included the following topics: lab results, ECG/telemetry, reviewing the PMH at length, assessment, discussing the plan of care with the patient, nurse, and Karoline Peña.

## 2017-02-02 NOTE — PLAN OF CARE
Problem: Patient Care Overview (Adult)  Goal: Plan of Care Review  Outcome: Ongoing (interventions implemented as appropriate)    02/02/17 0454   Coping/Psychosocial Response Interventions   Plan Of Care Reviewed With patient   Patient Care Overview   Progress progress toward functional goals as expected   Outcome Evaluation   Outcome Summary/Follow up Plan No c/o chest pain this shift.         Problem: Acute Coronary Syndrome (ACS) (Adult)  Goal: Signs and Symptoms of Listed Potential Problems Will be Absent or Manageable (Acute Coronary Syndrome)  Outcome: Ongoing (interventions implemented as appropriate)

## 2017-02-02 NOTE — ED PROVIDER NOTES
Subjective      Patient is a 56-year-old white male presents emergency Department with complaints of chest pain.  He reports intermittent chest pain for the past 2 days.  He states that he has had palpitations within this time.  Patient reports nausea with vomiting as well as diaphoresis.  He reports that his chest pain is approximally an 8 out of 10 at this time.  Patient has had some shortness of breath with the pain.  Patient reports any sort of activity seems to make it worse.  He states walking makes it much worse.  Patient reports these taken an aspirin, 2 nitroglycerin, and Rolaids without relief.  He does have some reproducible chest pain to the left breast.  He states that his breast is somewhat sore.  Patient actually has an appointment with Dr. Ramey on February 8 for follow-up.  Patient was previously followed by Dr. Broadbent with cardiology.  Patient's most recent heart cardiac cath was 01/07/2016.  On this cath records report that patient had a widely patent stent that was previously placed in the left anterior descending.  He had a new stent placed on this date to the left circumflex coronary artery.  Patient was evaluated in this emergency department on 01/17/2017 and had 2 negative cardiac markers.  He also had a negative stress echo performed.  He also had a barium swallow study that was negative as well.  As mentioned he was due to follow up with Dr. Ramey in 2 weeks.  Patient states that the appointment got changed and he will not be seeing Dr. Ramey to February 8.  Due to symptoms described he elected come the [facility] department for evaluation treatment.  Patient is a 56 y.o. male presenting with chest pain.   Chest Pain   Pain location:  L chest  Pain quality: sharp    Pain radiates to:  Neck  Pain severity:  Moderate  Duration:  2 days  Timing:  Intermittent  Progression:  Worsening  Context: at rest    Context: not breathing    Relieved by:  Nothing  Worsened by:  Nothing  Ineffective treatments:   Nitroglycerin  Associated symptoms: diaphoresis, dizziness, nausea, palpitations and shortness of breath    Associated symptoms: no abdominal pain, no AICD problem, no altered mental status, no anorexia, no anxiety, no back pain, no claudication, no dysphagia, no fatigue, no fever, no headache, no heartburn, no numbness, no orthopnea and no vomiting    Risk factors: coronary artery disease, diabetes mellitus, high cholesterol, hypertension, male sex and obesity        Review of Systems   Constitutional: Positive for diaphoresis. Negative for appetite change, chills, fatigue and fever.   HENT: Negative for congestion, sore throat and trouble swallowing.    Respiratory: Positive for shortness of breath. Negative for chest tightness.    Cardiovascular: Positive for chest pain and palpitations. Negative for orthopnea and claudication.   Gastrointestinal: Positive for nausea. Negative for abdominal distention, abdominal pain, anorexia, heartburn and vomiting.   Genitourinary: Negative for difficulty urinating and dysuria.   Musculoskeletal: Negative for back pain, joint swelling, neck pain and neck stiffness.   Skin: Negative for rash.   Neurological: Positive for dizziness. Negative for numbness and headaches.   All other systems reviewed and are negative.      Past Medical History   Diagnosis Date   • Asthma    • Coronary artery disease    • Diabetes mellitus    • GERD (gastroesophageal reflux disease)    • Hypertension    • Kidney stone    • Murmur, heart    • Myocardial infarction    • Seizures    • Stroke        Allergies   Allergen Reactions   • Atorvastatin Anaphylaxis   • Flagyl [Metronidazole] Anaphylaxis   • Ciprofloxacin Hives       Past Surgical History   Procedure Laterality Date   • Hernia repair     • Kidney stone surgery     • Cardiac catheterization  01/2016     Dr. Broadbent; widely patent previously placed stents in the left anterior descending and obstructive disease involving the diagonal branch  which was treated medically   • Coronary stent placement     • Coronary angioplasty         Family History   Problem Relation Age of Onset   • Heart disease Father    • Heart disease Maternal Grandmother        Social History     Social History   • Marital status:      Spouse name: N/A   • Number of children: N/A   • Years of education: N/A     Social History Main Topics   • Smoking status: Former Smoker   • Smokeless tobacco: None   • Alcohol use No   • Drug use: No   • Sexual activity: Defer     Other Topics Concern   • None     Social History Narrative       Prior to Admission medications    Medication Sig Start Date End Date Taking? Authorizing Provider   albuterol (PROVENTIL HFA;VENTOLIN HFA) 108 (90 BASE) MCG/ACT inhaler Inhale 2 puffs Every 6 (Six) Hours As Needed for wheezing.    Historical Provider, MD   ammonium lactate (AMLACTIN) 12 % cream Apply 1 application topically 2 (Two) Times a Day As Needed for dry skin.    Historical Provider, MD   carboxymethylcellulose (REFRESH PLUS) 0.5 % solution 1 drop 4 (Four) Times a Day As Needed for dry eyes.    Historical Provider, MD   clopidogrel (PLAVIX) 75 MG tablet Take 75 mg by mouth Daily.    Historical Provider, MD   etodolac (LODINE) 400 MG tablet Take 400 mg by mouth 2 (Two) Times a Day.    Historical Provider, MD   FLUTICASONE PROPIONATE, NASAL, NA 50 mcg into each nostril Daily. 2 SPRAYS IN EACH NOSTRIL    Historical Provider, MD   glipiZIDE (GLUCOTROL) 10 MG tablet Take 20 mg by mouth 2 (Two) Times a Day Before Meals.    Historical Provider, MD   hydrALAZINE (APRESOLINE) 25 MG tablet Take 25 mg by mouth 2 (Two) Times a Day.    Historical Provider, MD   insulin glargine (LANTUS) 100 UNIT/ML injection Inject 20 Units under the skin Daily.    Historical Provider, MD   isosorbide dinitrate (ISORDIL) 20 MG tablet Take 1 tablet by mouth 2 (Two) Times a Day. 10/18/16   TEENA Garcia   ketoconazole (NIZORAL) 2 % cream Apply 1 application topically  2 (Two) Times a Day.    Historical Provider, MD   LACTOBACILLUS PO Take  by mouth Daily.    Historical Provider, MD   lamoTRIgine (LaMICtal) 100 MG tablet Take 200 mg by mouth 2 (Two) Times a Day.    Historical Provider, MD   levETIRAcetam (KEPPRA) 500 MG tablet Take 2,000 mg by mouth 2 (Two) Times a Day.    Historical Provider, MD   lisinopril (PRINIVIL,ZESTRIL) 40 MG tablet Take 0.5 tablets by mouth Every Night. 10/18/16   TEENA Garcia   metFORMIN (GLUCOPHAGE) 1000 MG tablet Take 1,000 mg by mouth 2 (Two) Times a Day With Meals.    Historical Provider, MD   methocarbamol (ROBAXIN) 750 MG tablet Take 750 mg by mouth 4 (Four) Times a Day As Needed for muscle spasms.    Historical Provider, MD   metoprolol tartrate (LOPRESSOR) 50 MG tablet Take 1 tablet by mouth 2 (Two) Times a Day. 10/18/16   TEENA Garcia   nitroglycerin (NITROSTAT) 0.4 MG SL tablet Place 0.4 mg under the tongue Every 5 (Five) Minutes As Needed for chest pain. Take no more than 3 doses in 15 minutes.    Historical Provider, MD   pantoprazole (PROTONIX) 40 MG EC tablet Take 40 mg by mouth Daily.    Historical Provider, MD   promethazine (PHENERGAN) 50 MG tablet Take 25 mg by mouth Every 6 (Six) Hours As Needed for nausea or vomiting.    Historical Provider, MD   psyllium (METAMUCIL) 58.6 % packet Take  by mouth Daily. 1 TABLESPOON    Historical Provider, MD   rosuvastatin (CRESTOR) 40 MG tablet Take 20 mg by mouth Daily.    Historical Provider, MD   triamterene-hydrochlorothiazide (MAXZIDE) 75-50 MG per tablet Take 0.5 tablets by mouth Daily.    Historical Provider, MD   urea (CARMOL) 40 % ointment Apply  topically Daily.    Historical Provider, MD       Medications   dextrose (GLUTOSE) oral gel 15 g (not administered)   dextrose (D50W) solution 25 g (not administered)   glucagon (human recombinant) (GLUCAGEN DIAGNOSTIC) injection 1 mg (not administered)   insulin lispro (humaLOG) injection 2-7 Units (not administered)   Morphine  "sulfate (PF) injection 4 mg (4 mg Intravenous Given 2/1/17 1700)   ondansetron (ZOFRAN) injection 4 mg (4 mg Intravenous Given 2/1/17 1713)   Morphine sulfate (PF) injection 2 mg (2 mg Intravenous Given 2/1/17 2042)   ondansetron (ZOFRAN) injection 4 mg (4 mg Intravenous Given 2/1/17 2042)       Visit Vitals   • /87 (BP Location: Left arm, Patient Position: Lying)   • Pulse 57   • Temp 98.1 °F (36.7 °C) (Temporal Artery )   • Resp 18   • Ht 72\" (182.9 cm)   • Wt 245 lb (111 kg)   • SpO2 96%   • BMI 33.23 kg/m2         Objective   Physical Exam   Constitutional: He is oriented to person, place, and time. He appears well-developed and well-nourished.   HENT:   Head: Normocephalic and atraumatic.   Right Ear: External ear normal.   Left Ear: External ear normal.   Nose: Nose normal.   Mouth/Throat: Oropharynx is clear and moist.   Eyes: Conjunctivae and EOM are normal. Pupils are equal, round, and reactive to light.   Neck: Normal range of motion. Neck supple.   Cardiovascular: Normal rate, regular rhythm, normal heart sounds and intact distal pulses.    Pulmonary/Chest: Effort normal. He has rales. He exhibits tenderness.   Abdominal: Soft. Bowel sounds are normal.   Musculoskeletal: Normal range of motion.   Neurological: He is alert and oriented to person, place, and time.   Skin: Skin is warm and dry.   Psychiatric: He has a normal mood and affect. His behavior is normal. Judgment and thought content normal.   Nursing note and vitals reviewed.      Procedures         Lab Results (last 24 hours)     Procedure Component Value Units Date/Time    CBC & Differential [[id number]] Collected:  02/01/17 1656    Specimen:  Blood Updated:  02/01/17 1714    Narrative:       The following orders were created for panel order CBC & Differential.  Procedure                               Abnormality         Status                     ---------                               -----------         ------                     CBC Auto " Differential[[id number]]         Abnormal            Final result                 Please view results for these tests on the individual orders.    Comprehensive Metabolic Panel [[id number]]  (Abnormal) Collected:  02/01/17 1656    Specimen:  Blood Updated:  02/01/17 1724     Glucose 178 (H) mg/dL      BUN 18 mg/dL      Creatinine 1.35 mg/dL      Sodium 134 (L) mmol/L      Potassium 5.4 (H) mmol/L      Chloride 100 mmol/L      CO2 22.0 (L) mmol/L      Calcium 9.4 mg/dL      Total Protein 7.5 g/dL      Albumin 4.50 g/dL      ALT (SGPT) 61 (H) U/L      AST (SGOT) 39 U/L      Alkaline Phosphatase 85 U/L      Total Bilirubin 0.7 mg/dL      eGFR Non African Amer 54 (L) mL/min/1.73      Globulin 3.0 gm/dL      A/G Ratio 1.5 g/dL      BUN/Creatinine Ratio 13.3      Anion Gap 12.0 mmol/L     Protime-INR [[id number]]  (Normal) Collected:  02/01/17 1656    Specimen:  Blood Updated:  02/01/17 1834     Protime 13.0 Seconds      INR 0.95     aPTT [[id number]]  (Abnormal) Collected:  02/01/17 1656    Specimen:  Blood Updated:  02/01/17 1808     PTT 23.6 (L) seconds     CBC Auto Differential [[id number]]  (Abnormal) Collected:  02/01/17 1656    Specimen:  Blood Updated:  02/01/17 1714     WBC 6.53 10*3/mm3      RBC 4.46 (L) 10*6/mm3      Hemoglobin 13.3 (L) g/dL      Hematocrit 40.2 %      MCV 90.1 fL      MCH 29.8 pg      MCHC 33.1 g/dL      RDW 12.9 %      RDW-SD 42.0 fl      MPV 11.5 fL      Platelets 127 (L) 10*3/mm3      Neutrophil % 68.1 %      Lymphocyte % 23.0 %      Monocyte % 6.3 %      Eosinophil % 1.4 %      Basophil % 0.3 %      Immature Grans % 0.9 %      Neutrophils, Absolute 4.45 10*3/mm3      Lymphocytes, Absolute 1.50 10*3/mm3      Monocytes, Absolute 0.41 10*3/mm3      Eosinophils, Absolute 0.09 10*3/mm3      Basophils, Absolute 0.02 10*3/mm3      Immature Grans, Absolute 0.06 (H) 10*3/mm3     POC Troponin, Rapid [[id number]]  (Normal) Collected:  02/01/17 1707    Specimen:  Blood Updated:  02/01/17 1721     Troponin I 0.00  ng/mL       Serial Number: [id number]    : [id number]       POC Troponin, Rapid [[id number]]  (Normal) Collected:  02/01/17 2002    Specimen:  Blood Updated:  02/01/17 2016     Troponin I 0.00 ng/mL       Serial Number: [id number]    : [id number]             No orders to display         ED Course  ED Course   Spoke with Dr. Sue. Plan is to admit to Dr. Ramey on cardiology floor.     HEART Score  History: Slightly suspicious (+0)  ECG: Normal (+0)  Age: 45 through 65 (+1)  Risk Factors: 3 or more risk factors OR history of atherosclerotic disease (+2)  Troponin: Normal limit or lower (+0)  Total: 3        MDM  Number of Diagnoses or Management Options  Chest pain, unspecified type: new and requires workup     Amount and/or Complexity of Data Reviewed  Clinical lab tests: ordered and reviewed  Tests in the radiology section of CPT®: ordered and reviewed  Discuss the patient with other providers: yes    Risk of Complications, Morbidity, and/or Mortality  Presenting problems: high  Diagnostic procedures: high  Management options: high    Patient Progress  Patient progress: improved      Final diagnoses:   Chest pain, unspecified type          Celia De La Torre, APRN  02/01/17 [id number]

## 2017-02-03 ENCOUNTER — APPOINTMENT (OUTPATIENT)
Dept: CARDIOLOGY | Facility: HOSPITAL | Age: 57
End: 2017-02-03

## 2017-02-03 LAB
BH CV STRESS COMMENTS STAGE 1: NORMAL
BH CV STRESS DOSE REGADENOSON STAGE 1: 0.4
BH CV STRESS DURATION MIN STAGE 1: 0
BH CV STRESS DURATION SEC STAGE 1: 10
BH CV STRESS HR STAGE 1: 66
BH CV STRESS PROTOCOL 1: NORMAL
BH CV STRESS RECOVERY BP: NORMAL MMHG
BH CV STRESS RECOVERY HR: 70 BPM
BH CV STRESS STAGE 1: 1
GLUCOSE BLDC GLUCOMTR-MCNC: 148 MG/DL (ref 70–130)
GLUCOSE BLDC GLUCOMTR-MCNC: 173 MG/DL (ref 70–130)
GLUCOSE BLDC GLUCOMTR-MCNC: 175 MG/DL (ref 70–130)
GLUCOSE BLDC GLUCOMTR-MCNC: 239 MG/DL (ref 70–130)
LV EF NUC BP: 63 %
MAXIMAL PREDICTED HEART RATE: 162 BPM
PERCENT MAX PREDICTED HR: 53.7 %
STRESS BASELINE BP: NORMAL MMHG
STRESS BASELINE HR: 62 BPM
STRESS PERCENT HR: 63 %
STRESS POST EXERCISE DUR SEC: 10 SEC
STRESS POST PEAK BP: NORMAL MMHG
STRESS POST PEAK HR: 87 BPM
STRESS TARGET HR: 138 BPM

## 2017-02-03 PROCEDURE — 82962 GLUCOSE BLOOD TEST: CPT

## 2017-02-03 PROCEDURE — G0378 HOSPITAL OBSERVATION PER HR: HCPCS

## 2017-02-03 PROCEDURE — 93017 CV STRESS TEST TRACING ONLY: CPT

## 2017-02-03 PROCEDURE — 25010000002 REGADENOSON 0.4 MG/5ML SOLUTION: Performed by: INTERNAL MEDICINE

## 2017-02-03 PROCEDURE — 96372 THER/PROPH/DIAG INJ SC/IM: CPT

## 2017-02-03 PROCEDURE — 63710000001 INSULIN DETEMIR PER 5 UNITS: Performed by: INTERNAL MEDICINE

## 2017-02-03 PROCEDURE — 25010000002 ENOXAPARIN PER 10 MG: Performed by: INTERNAL MEDICINE

## 2017-02-03 PROCEDURE — 0 TECHNETIUM SESTAMIBI: Performed by: INTERNAL MEDICINE

## 2017-02-03 PROCEDURE — 93018 CV STRESS TEST I&R ONLY: CPT | Performed by: INTERNAL MEDICINE

## 2017-02-03 PROCEDURE — A9500 TC99M SESTAMIBI: HCPCS | Performed by: INTERNAL MEDICINE

## 2017-02-03 PROCEDURE — 99224 PR SBSQ OBSERVATION CARE/DAY 15 MINUTES: CPT | Performed by: INTERNAL MEDICINE

## 2017-02-03 PROCEDURE — 78452 HT MUSCLE IMAGE SPECT MULT: CPT | Performed by: INTERNAL MEDICINE

## 2017-02-03 PROCEDURE — 78451 HT MUSCLE IMAGE SPECT SING: CPT

## 2017-02-03 RX ORDER — NAPROXEN 500 MG/1
500 TABLET ORAL 2 TIMES DAILY WITH MEALS
Status: DISCONTINUED | OUTPATIENT
Start: 2017-02-03 | End: 2017-02-04 | Stop reason: HOSPADM

## 2017-02-03 RX ADMIN — INSULIN DETEMIR 20 UNITS: 100 INJECTION, SOLUTION SUBCUTANEOUS at 21:55

## 2017-02-03 RX ADMIN — ISOSORBIDE DINITRATE 20 MG: 20 TABLET ORAL at 21:57

## 2017-02-03 RX ADMIN — LEVETIRACETAM 2000 MG: 500 TABLET, FILM COATED ORAL at 21:57

## 2017-02-03 RX ADMIN — REGADENOSON 0.4 MG: 0.08 INJECTION, SOLUTION INTRAVENOUS at 11:22

## 2017-02-03 RX ADMIN — COLCHICINE 0.6 MG: 0.6 TABLET, FILM COATED ORAL at 21:57

## 2017-02-03 RX ADMIN — METHOCARBAMOL 750 MG: 750 TABLET ORAL at 09:15

## 2017-02-03 RX ADMIN — Medication 1 PACKET: at 09:12

## 2017-02-03 RX ADMIN — METHOCARBAMOL 750 MG: 750 TABLET ORAL at 17:23

## 2017-02-03 RX ADMIN — METOPROLOL TARTRATE 50 MG: 50 TABLET ORAL at 09:13

## 2017-02-03 RX ADMIN — PANTOPRAZOLE SODIUM 40 MG: 40 TABLET, DELAYED RELEASE ORAL at 05:26

## 2017-02-03 RX ADMIN — LAMOTRIGINE 200 MG: 100 TABLET ORAL at 09:15

## 2017-02-03 RX ADMIN — LISINOPRIL 20 MG: 20 TABLET ORAL at 09:13

## 2017-02-03 RX ADMIN — ASPIRIN 81 MG 81 MG: 81 TABLET ORAL at 09:14

## 2017-02-03 RX ADMIN — INSULIN LISPRO 3 UNITS: 100 INJECTION, SOLUTION INTRAVENOUS; SUBCUTANEOUS at 17:23

## 2017-02-03 RX ADMIN — ENOXAPARIN SODIUM 40 MG: 40 INJECTION SUBCUTANEOUS at 09:23

## 2017-02-03 RX ADMIN — ISOSORBIDE DINITRATE 20 MG: 20 TABLET ORAL at 09:13

## 2017-02-03 RX ADMIN — Medication 1 DOSE: at 10:50

## 2017-02-03 RX ADMIN — GLIPIZIDE 10 MG: 10 TABLET ORAL at 09:14

## 2017-02-03 RX ADMIN — METHOCARBAMOL 750 MG: 750 TABLET ORAL at 14:24

## 2017-02-03 RX ADMIN — METOPROLOL TARTRATE 50 MG: 50 TABLET ORAL at 21:57

## 2017-02-03 RX ADMIN — METHOCARBAMOL 750 MG: 750 TABLET ORAL at 22:01

## 2017-02-03 RX ADMIN — Medication 1 CAPSULE: at 09:14

## 2017-02-03 RX ADMIN — NITROGLYCERIN 0.4 MG: 0.4 TABLET SUBLINGUAL at 19:08

## 2017-02-03 RX ADMIN — TRIAMTERENE AND HYDROCHLOROTHIAZIDE 0.5 TABLET: 75; 50 TABLET ORAL at 09:12

## 2017-02-03 RX ADMIN — GLIPIZIDE 10 MG: 10 TABLET ORAL at 17:23

## 2017-02-03 RX ADMIN — COLCHICINE 0.6 MG: 0.6 TABLET, FILM COATED ORAL at 09:14

## 2017-02-03 RX ADMIN — LEVETIRACETAM 2000 MG: 500 TABLET, FILM COATED ORAL at 09:13

## 2017-02-03 RX ADMIN — Medication 1 DOSE: at 11:50

## 2017-02-03 RX ADMIN — HYDRALAZINE HYDROCHLORIDE 25 MG: 25 TABLET ORAL at 09:15

## 2017-02-03 RX ADMIN — CLOPIDOGREL BISULFATE 75 MG: 75 TABLET, FILM COATED ORAL at 09:13

## 2017-02-03 NOTE — PROGRESS NOTES
[facility] HEART GROUP -  Progress Note     LOS: 0 days   Patient Care Team:  GINA Felder as PCP - General  GINA Felder as PCP - Family Medicine  Wade Ramey MD as Cardiologist (Cardiology)    Chief Complaint: chest pain     Subjective     Interval History:     Patient Complaints: The patient continues to have intermittent left sided chest wall pain- reproduced with palpation. Mild dyspnea.        Review of Systems:     Review of Systems   Constitutional: Negative for diaphoresis, fatigue, fever and unexpected weight change.   HENT: Negative for nosebleeds.    Respiratory: Positive for shortness of breath. Negative for apnea, cough, chest tightness and wheezing.    Cardiovascular: Positive for chest pain. Negative for palpitations and leg swelling.   Gastrointestinal: Negative for abdominal distention, nausea and vomiting.   Genitourinary: Negative for hematuria.   Musculoskeletal: Negative for gait problem.   Skin: Negative for color change.   Neurological: Negative for dizziness, syncope, weakness and light-headedness.     Objective     Vital Sign Min/Max for last 24 hours  Temp  Min: 98.1 °F (36.7 °C)  Max: 99 °F (37.2 °C)   BP  Min: 134/77  Max: 139/82   Pulse  Min: 57  Max: 63   Resp  Min: 18  Max: 20   SpO2  Min: 95 %  Max: 98 %   Flow (L/min)  Min: 2  Max: 2   Weight  Min: 244 lb 2 oz (111 kg)  Max: 244 lb 2 oz (111 kg)     Last Weight    02/02/17 2009   Weight: 244 lb 2 oz (111 kg)       Physical Exam:    Physical Exam   Constitutional: He is oriented to person, place, and time. He appears well-developed and well-nourished. No distress.   HENT:   Head: Normocephalic and atraumatic.   Eyes: Pupils are equal, round, and reactive to light.   Neck: Normal range of motion. Neck supple. No JVD present. No thyromegaly present.   Cardiovascular: Normal rate, regular rhythm and intact distal pulses.  Exam reveals no gallop and no friction rub.    Murmur (1/6 systolic murmur )  heard.  Pulses:       Dorsalis pedis pulses are 2+ on the right side, and 2+ on the left side.   Pulmonary/Chest: Effort normal and breath sounds normal. No respiratory distress. He has no wheezes. He has no rales. He exhibits no tenderness.   Abdominal: Soft. Bowel sounds are normal. He exhibits no distension. There is no tenderness.   Musculoskeletal: Normal range of motion. He exhibits no edema.   Neurological: He is alert and oriented to person, place, and time. No cranial nerve deficit.   Skin: Skin is warm and dry. He is not diaphoretic.   Dry flaky skin   Psychiatric: He has a normal mood and affect. His behavior is normal.     Results Review:   Lab Results (last 72 hours)     Procedure Component Value Units Date/Time    CBC & Differential [[id number]] Collected:  02/01/17 1656    Specimen:  Blood Updated:  02/01/17 1714    Narrative:       The following orders were created for panel order CBC & Differential.  Procedure                               Abnormality         Status                     ---------                               -----------         ------                     CBC Auto Differential[[id number]]         Abnormal            Final result                 Please view results for these tests on the individual orders.    CBC Auto Differential [[id number]]  (Abnormal) Collected:  02/01/17 1656    Specimen:  Blood Updated:  02/01/17 1714     WBC 6.53 10*3/mm3      RBC 4.46 (L) 10*6/mm3      Hemoglobin 13.3 (L) g/dL      Hematocrit 40.2 %      MCV 90.1 fL      MCH 29.8 pg      MCHC 33.1 g/dL      RDW 12.9 %      RDW-SD 42.0 fl      MPV 11.5 fL      Platelets 127 (L) 10*3/mm3      Neutrophil % 68.1 %      Lymphocyte % 23.0 %      Monocyte % 6.3 %      Eosinophil % 1.4 %      Basophil % 0.3 %      Immature Grans % 0.9 %      Neutrophils, Absolute 4.45 10*3/mm3      Lymphocytes, Absolute 1.50 10*3/mm3      Monocytes, Absolute 0.41 10*3/mm3      Eosinophils, Absolute 0.09 10*3/mm3      Basophils, Absolute  0.02 10*3/mm3      Immature Grans, Absolute 0.06 (H) 10*3/mm3     POC Troponin, Rapid [[id number]]  (Normal) Collected:  02/01/17 1707    Specimen:  Blood Updated:  02/01/17 1721     Troponin I 0.00 ng/mL       Serial Number: [id number]    : [id number]       Comprehensive Metabolic Panel [[id number]]  (Abnormal) Collected:  02/01/17 1656    Specimen:  Blood Updated:  02/01/17 1724     Glucose 178 (H) mg/dL      BUN 18 mg/dL      Creatinine 1.35 mg/dL      Sodium 134 (L) mmol/L      Potassium 5.4 (H) mmol/L      Chloride 100 mmol/L      CO2 22.0 (L) mmol/L      Calcium 9.4 mg/dL      Total Protein 7.5 g/dL      Albumin 4.50 g/dL      ALT (SGPT) 61 (H) U/L      AST (SGOT) 39 U/L      Alkaline Phosphatase 85 U/L      Total Bilirubin 0.7 mg/dL      eGFR Non African Amer 54 (L) mL/min/1.73      Globulin 3.0 gm/dL      A/G Ratio 1.5 g/dL      BUN/Creatinine Ratio 13.3      Anion Gap 12.0 mmol/L     aPTT [[id number]]  (Abnormal) Collected:  02/01/17 1656    Specimen:  Blood Updated:  02/01/17 1808     PTT 23.6 (L) seconds     Protime-INR [[id number]]  (Normal) Collected:  02/01/17 1656    Specimen:  Blood Updated:  02/01/17 1834     Protime 13.0 Seconds      INR 0.95     POC Troponin, Rapid [[id number]]  (Normal) Collected:  02/01/17 2002    Specimen:  Blood Updated:  02/01/17 2016     Troponin I 0.00 ng/mL       Serial Number: [id number]    : [id number]       POC Glucose Fingerstick [[id number]]  (Abnormal) Collected:  02/02/17 0013    Specimen:  Blood Updated:  02/02/17 0037     Glucose 142 (H) mg/dL       : [id number] Victorino Sanderser ID: [id number]       Hemoglobin A1c [[id number]] Collected:  02/02/17 0002    Specimen:  Blood Updated:  02/02/17 0045     Hemoglobin A1C 7.9 %     Narrative:       Less than 6.0           Non-Diabetic Range  6.0-7.0                 ADA Therapeutic Target  Greater than 7.0        Action Suggested    Troponin [[id number]]  (Normal) Collected:  02/01/17 2344    Specimen:  Blood Updated:  02/02/17  0048     Troponin I <0.012 ng/mL     Lipid Panel [[id number]]  (Abnormal) Collected:  02/02/17 0002    Specimen:  Blood Updated:  02/02/17 0126     Total Cholesterol 191 mg/dL      Triglycerides 247 (H) mg/dL      HDL Cholesterol 45 mg/dL      LDL Cholesterol  117 (H) mg/dL      LDL/HDL Ratio 2.15     POC Glucose Fingerstick [[id number]]  (Normal) Collected:  02/02/17 0901    Specimen:  Blood Updated:  02/02/17 0917     Glucose 124 mg/dL       : [id number] "[employer]aneMeter ID: [id number]       Basic Metabolic Panel [[id number]]  (Abnormal) Collected:  02/02/17 1045    Specimen:  Blood Updated:  02/02/17 1117     Glucose 117 (H) mg/dL      BUN 21 mg/dL      Creatinine 1.06 mg/dL      Sodium 135 mmol/L      Potassium 4.5 mmol/L      Chloride 97 (L) mmol/L      CO2 28.0 mmol/L      Calcium 9.7 mg/dL      eGFR Non African Amer 72 mL/min/1.73      BUN/Creatinine Ratio 19.8      Anion Gap 10.0 mmol/L     Narrative:       GFR Normal >60  Chronic Kidney Disease <60  Kidney Failure <15    POC Glucose Fingerstick [[id number]]  (Abnormal) Collected:  02/02/17 1235    Specimen:  Blood Updated:  02/02/17 1312     Glucose 229 (H) mg/dL       : [id number] PontabaMeter ID: [id number]       Urinalysis With / Microscopic If Indicated [[id number]]  (Normal) Collected:  02/02/17 1426    Specimen:  Urine from Urine, Clean Catch Updated:  02/02/17 1433     Color, UA Yellow      Appearance, UA Clear      pH, UA 6.0      Specific Gravity, UA 1.018      Glucose, UA Negative      Ketones, UA Negative      Bilirubin, UA Negative      Blood, UA Negative      Protein, UA Negative      Leuk Esterase, UA Negative      Nitrite, UA Negative      Urobilinogen, UA 0.2 E.U./dL     Narrative:       Urine microscopic not indicated.    POC Glucose Fingerstick [[id number]]  (Abnormal) Collected:  02/02/17 1711    Specimen:  Blood Updated:  02/02/17 1724     Glucose 171 (H) mg/dL       : [id number] [employer] ID: [id number]       POC  Glucose Fingerstick [[id number]]  (Abnormal) Collected:  02/02/17 2014    Specimen:  Blood Updated:  02/02/17 2043     Glucose 201 (H) mg/dL       : [id number] Elly Paceter ID: [id number]       POC Glucose Fingerstick [[id number]]  (Abnormal) Collected:  02/03/17 0806    Specimen:  Blood Updated:  02/03/17 0825     Glucose 173 (H) mg/dL       : [id number] Helena KaleeMeter ID: [id number]                 Echo EF Estimated  Lab Results   Component Value Date    ECHOEFEST 55 01/17/2017         Cath Ejection Fraction Quantitative  No results found for: CATHEF        Medication Review: yes  Current Facility-Administered Medications   Medication Dose Route Frequency Provider Last Rate Last Dose   • acetaminophen (TYLENOL) tablet 650 mg  650 mg Oral Q6H PRN Cyril Sue MD       • albuterol (PROVENTIL) nebulizer solution 0.083% 2.5 mg/3mL  2.5 mg Nebulization Q6H PRN Cyril Sue MD       • ammonium lactate (AMLACTIN) 12 % cream   Topical BID PRN Cyril Sue MD       • aspirin chewable tablet 81 mg  81 mg Oral Daily TEENA Borges   81 mg at 02/03/17 0914   • clopidogrel (PLAVIX) tablet 75 mg  75 mg Oral Daily Cyril Sue MD   75 mg at 02/03/17 0913   • colchicine tablet 0.6 mg  0.6 mg Oral Q12H Lorenza Santa APRN   0.6 mg at 02/03/17 0914   • dextrose (D50W) solution 25 g  25 g Intravenous Q15 Min PRN Cyril Sue MD       • dextrose (GLUTOSE) oral gel 15 g  15 g Oral Q15 Min PRN Cyril Sue MD       • enoxaparin (LOVENOX) syringe 40 mg  40 mg Subcutaneous Daily Wade Ramey MD   40 mg at 02/03/17 0923   • fluticasone (FLONASE) 50 MCG/ACT nasal spray 2 spray  2 spray Each Nare Daily Cyril Sue MD   2 spray at 02/02/17 0852   • glipiZIDE (GLUCOTROL) tablet 10 mg  10 mg Oral BID AC Cyril Sue MD   10 mg at 02/03/17 0914   • glucagon (human recombinant) (GLUCAGEN DIAGNOSTIC) injection 1 mg  1 mg Subcutaneous Q15 Min PRN Cyril Sue MD       • hydrALAZINE (APRESOLINE) tablet 25 mg   25 mg Oral Q12H Cyril Sue MD   25 mg at 02/03/17 0915   • insulin detemir (LEVEMIR) injection 20 Units  20 Units Subcutaneous Nightly Cyril Sue MD   20 Units at 02/02/17 2020   • insulin lispro (humaLOG) injection 2-7 Units  2-7 Units Subcutaneous 4x Daily AC & at Bedtime Cyril Sue MD   3 Units at 02/02/17 2020   • isosorbide dinitrate (ISORDIL) tablet 20 mg  20 mg Oral Q12H Cyril Sue MD   20 mg at 02/03/17 0913   • ketoconazole (NIZORAL) 2 % cream   Topical Q12H Cyril Sue MD       • lactobacillus acidophilus (RISAQUAD) capsule 1 capsule  1 capsule Oral Daily Cyril Sue MD   1 capsule at 02/03/17 0914   • lamoTRIgine (LaMICtal) tablet 200 mg  200 mg Oral Q12H Cyril Sue MD   200 mg at 02/03/17 0915   • levETIRAcetam (KEPPRA) tablet 2,000 mg  2,000 mg Oral Q12H Cyril Sue MD   2,000 mg at 02/03/17 0913   • lisinopril (PRINIVIL,ZESTRIL) tablet 20 mg  20 mg Oral Q24H Cyril Sue MD   20 mg at 02/03/17 0913   • methocarbamol (ROBAXIN) tablet 750 mg  750 mg Oral 4x Daily Cyril Sue MD   750 mg at 02/03/17 0915   • metoprolol tartrate (LOPRESSOR) tablet 50 mg  50 mg Oral Q12H Cyril Sue MD   50 mg at 02/03/17 0913   • nitroglycerin (NITROSTAT) SL tablet 0.4 mg  0.4 mg Sublingual Q5 Min PRN Cyril Sue MD   0.4 mg at 02/02/17 1926   • pantoprazole (PROTONIX) EC tablet 40 mg  40 mg Oral Q AM Cyril Sue MD   40 mg at 02/03/17 0526   • polyvinyl alcohol-povidone (HYPOTEARS) 1.4-0.6 % ophthalmic solution 1 drop  1 drop Both Eyes 4x Daily PRN Cyril Sue MD       • promethazine (PHENERGAN) tablet 25 mg  25 mg Oral Q6H PRN Cyril Sue MD   25 mg at 02/02/17 0025   • psyllium (METAMUCIL MULTIHEALTH FIBER) 58.12 % packet 1 packet  1 packet Oral Daily Cyril Sue MD   1 packet at 02/03/17 0912   • rosuvastatin (CRESTOR) tablet 40 mg  40 mg Oral Nightly Wade Ramey MD   40 mg at 02/02/17 2019   • triamterene-hydrochlorothiazide (MAXZIDE) 75-50 MG per tablet 0.5 tablet  0.5 tablet Oral  Daily Cyril Sue MD   0.5 tablet at 02/03/17 0912         Assessment/Plan     Principal Problem:    Chest pain, unspecified  Active Problems:    Type 2 diabetes mellitus without complication    Gastroesophageal reflux disease without esophagitis    Essential hypertension    Seizure disorder    Coronary artery disease involving native coronary artery of native heart with angina pectoris    Mixed hyperlipidemia    Plan-  Lexiscan today  Known  of small diagonal   Telemetry  OMT- asa, plavix, statin, bb, isordil   Cardiac low sodium diet  VTE proph with lovenox    Amada Crump, APRN  02/03/17  9:46 AM

## 2017-02-03 NOTE — PLAN OF CARE
Problem: Patient Care Overview (Adult)  Goal: Plan of Care Review  Outcome: Ongoing (interventions implemented as appropriate)    02/02/17 1518 02/02/17 1954 02/03/17 0246   Coping/Psychosocial Response Interventions   Plan Of Care Reviewed With --  patient --    Patient Care Overview   Progress progress toward functional goals as expected --  --    Outcome Evaluation   Outcome Summary/Follow up Plan --  --  no c/o chest pain through night, resting peacefully and quietly, NPO for stress test in AM       Goal: Adult Individualization and Mutuality  Outcome: Ongoing (interventions implemented as appropriate)  Goal: Discharge Needs Assessment  Outcome: Ongoing (interventions implemented as appropriate)    Problem: Acute Coronary Syndrome (ACS) (Adult)  Goal: Signs and Symptoms of Listed Potential Problems Will be Absent or Manageable (Acute Coronary Syndrome)  Outcome: Ongoing (interventions implemented as appropriate)    02/02/17 1518   Acute Coronary Syndrome (ACS)   Problems Assessed (Acute Coronary Syndrome (ACS)) all   Problems Present (Acute Coronary Syndrome (ACS)) situational response;chest pain (angina)

## 2017-02-03 NOTE — PROGRESS NOTES
Continued Stay Note  [facility]     Patient Name: Carlos A Boyer Jr.  MRN: [id number]  Today's Date: 2/3/2017    Admit Date: 2/1/2017          Discharge Plan       02/03/17 0954    Case Management/Social Work Plan    Plan PT has [facility] coverage and attends the [facility]. PT gets his medications [facility] through the [facility]. PT will need to be sent home with a few days worth of any new dc meds upon dc to cover until the [facility] can yanet them via mail. [facility] also needs PT DC summary faxed upon dc. [phone] phone [phone] fax. Will follow.     Patient/Family In Agreement With Plan yes              Discharge Codes     None            VIKI Flores

## 2017-02-04 VITALS
HEART RATE: 63 BPM | OXYGEN SATURATION: 98 % | DIASTOLIC BLOOD PRESSURE: 56 MMHG | HEIGHT: 72 IN | SYSTOLIC BLOOD PRESSURE: 114 MMHG | BODY MASS INDEX: 33.36 KG/M2 | TEMPERATURE: 98.9 F | WEIGHT: 246.31 LBS | RESPIRATION RATE: 18 BRPM

## 2017-02-04 LAB
GLUCOSE BLDC GLUCOMTR-MCNC: 148 MG/DL (ref 70–130)
GLUCOSE BLDC GLUCOMTR-MCNC: 192 MG/DL (ref 70–130)

## 2017-02-04 PROCEDURE — 96372 THER/PROPH/DIAG INJ SC/IM: CPT

## 2017-02-04 PROCEDURE — G0378 HOSPITAL OBSERVATION PER HR: HCPCS

## 2017-02-04 PROCEDURE — 25010000002 KETOROLAC TROMETHAMINE PER 15 MG: Performed by: INTERNAL MEDICINE

## 2017-02-04 PROCEDURE — 99217 PR OBSERVATION CARE DISCHARGE MANAGEMENT: CPT | Performed by: INTERNAL MEDICINE

## 2017-02-04 PROCEDURE — 25010000002 ENOXAPARIN PER 10 MG: Performed by: INTERNAL MEDICINE

## 2017-02-04 PROCEDURE — 82962 GLUCOSE BLOOD TEST: CPT

## 2017-02-04 RX ORDER — NAPROXEN 500 MG/1
500 TABLET ORAL 2 TIMES DAILY WITH MEALS
Qty: 6 TABLET | Refills: 0 | Status: SHIPPED | OUTPATIENT
Start: 2017-02-04 | End: 2017-02-07

## 2017-02-04 RX ORDER — ASPIRIN 81 MG/1
81 TABLET, CHEWABLE ORAL DAILY
Start: 2017-02-04 | End: 2017-08-25 | Stop reason: HOSPADM

## 2017-02-04 RX ORDER — KETOROLAC TROMETHAMINE 30 MG/ML
30 INJECTION, SOLUTION INTRAMUSCULAR; INTRAVENOUS ONCE
Status: COMPLETED | OUTPATIENT
Start: 2017-02-04 | End: 2017-02-04

## 2017-02-04 RX ORDER — COLCHICINE 0.6 MG/1
0.6 TABLET ORAL EVERY 12 HOURS SCHEDULED
Qty: 42 TABLET | Refills: 0 | Status: SHIPPED | OUTPATIENT
Start: 2017-02-04 | End: 2017-04-21 | Stop reason: ALTCHOICE

## 2017-02-04 RX ORDER — LISINOPRIL 20 MG/1
20 TABLET ORAL
Start: 2017-02-04 | End: 2017-04-21 | Stop reason: DRUGHIGH

## 2017-02-04 RX ADMIN — ASPIRIN 81 MG 81 MG: 81 TABLET ORAL at 08:23

## 2017-02-04 RX ADMIN — LISINOPRIL 20 MG: 20 TABLET ORAL at 08:25

## 2017-02-04 RX ADMIN — ENOXAPARIN SODIUM 40 MG: 40 INJECTION SUBCUTANEOUS at 08:23

## 2017-02-04 RX ADMIN — ROSUVASTATIN CALCIUM 40 MG: 10 TABLET, FILM COATED ORAL at 00:06

## 2017-02-04 RX ADMIN — TRIAMTERENE AND HYDROCHLOROTHIAZIDE 0.5 TABLET: 75; 50 TABLET ORAL at 08:25

## 2017-02-04 RX ADMIN — Medication 1 CAPSULE: at 08:24

## 2017-02-04 RX ADMIN — LAMOTRIGINE 200 MG: 100 TABLET ORAL at 00:06

## 2017-02-04 RX ADMIN — NAPROXEN 500 MG: 500 TABLET ORAL at 08:24

## 2017-02-04 RX ADMIN — KETOROLAC TROMETHAMINE 30 MG: 30 INJECTION, SOLUTION INTRAMUSCULAR at 11:50

## 2017-02-04 RX ADMIN — GLIPIZIDE 10 MG: 10 TABLET ORAL at 08:24

## 2017-02-04 RX ADMIN — NAPROXEN 500 MG: 500 TABLET ORAL at 00:06

## 2017-02-04 RX ADMIN — CLOPIDOGREL BISULFATE 75 MG: 75 TABLET, FILM COATED ORAL at 08:23

## 2017-02-04 RX ADMIN — METOPROLOL TARTRATE 50 MG: 50 TABLET ORAL at 08:25

## 2017-02-04 RX ADMIN — LAMOTRIGINE 200 MG: 100 TABLET ORAL at 08:24

## 2017-02-04 RX ADMIN — PANTOPRAZOLE SODIUM 40 MG: 40 TABLET, DELAYED RELEASE ORAL at 05:48

## 2017-02-04 RX ADMIN — ISOSORBIDE DINITRATE 20 MG: 20 TABLET ORAL at 08:24

## 2017-02-04 RX ADMIN — COLCHICINE 0.6 MG: 0.6 TABLET, FILM COATED ORAL at 08:23

## 2017-02-04 RX ADMIN — METHOCARBAMOL 750 MG: 750 TABLET ORAL at 11:50

## 2017-02-04 RX ADMIN — Medication 1 PACKET: at 08:25

## 2017-02-04 RX ADMIN — HYDRALAZINE HYDROCHLORIDE 25 MG: 25 TABLET ORAL at 08:24

## 2017-02-04 RX ADMIN — METHOCARBAMOL 750 MG: 750 TABLET ORAL at 08:23

## 2017-02-04 RX ADMIN — LEVETIRACETAM 2000 MG: 500 TABLET, FILM COATED ORAL at 08:24

## 2017-02-04 RX ADMIN — INSULIN LISPRO 2 UNITS: 100 INJECTION, SOLUTION INTRAVENOUS; SUBCUTANEOUS at 08:26

## 2017-02-04 NOTE — PROGRESS NOTES
"[facility] HEART GROUP -  Progress Note     LOS: 0 days   Patient Care Team:  GINA Felder as PCP - General  GINA Felder as PCP - Family Medicine  Wade Ramey MD as Cardiologist (Cardiology)    Chief Complaint: Chest Pain         Subjective     Interval History:   Patient reports continues left sided chest pain with ambulation. +chest pain tenderness. Patient states that he is unsure if he is comfortable being discharged home today- \"I want to walk around some more\".       Review of Systems:   Review of Systems   Constitution: Negative for fever, weakness and malaise/fatigue.   Cardiovascular: Positive for chest pain. Negative for dyspnea on exertion, irregular heartbeat, leg swelling, near-syncope, orthopnea, palpitations, paroxysmal nocturnal dyspnea and syncope.   Respiratory: Negative for cough, shortness of breath and sleep disturbances due to breathing.    Skin: Negative for rash.   Musculoskeletal: Negative for falls.   Gastrointestinal: Negative for bloating, abdominal pain, nausea and vomiting.   Psychiatric/Behavioral: Negative for altered mental status.        Objective     Vital Sign Min/Max for last 24 hours  Temp  Min: 97.7 °F (36.5 °C)  Max: 98.9 °F (37.2 °C)   BP  Min: 98/57  Max: 128/85   Pulse  Min: 57  Max: 76   Resp  Min: 18  Max: 20   SpO2  Min: 96 %  Max: 98 %   Flow (L/min)  Min: 2  Max: 2   Weight  Min: 246 lb 5 oz (112 kg)  Max: 246 lb 5 oz (112 kg)     Last Weight    02/03/17 2012   Weight: 246 lb 5 oz (112 kg)         Intake/Output Summary (Last 24 hours) at 02/04/17 0808  Last data filed at 02/04/17 0354   Gross per 24 hour   Intake    840 ml   Output   2500 ml   Net  -1660 ml         Physical Exam:  Physical Exam   Constitutional: He is oriented to person, place, and time. He appears well-developed and well-nourished. He is cooperative. No distress.   Sitting on the side of the bed eating breakfast    HENT:   Head: Normocephalic and atraumatic. "   Cardiovascular: Normal rate, regular rhythm, S2 normal and intact distal pulses.    No murmur heard.  Pulmonary/Chest: Effort normal and breath sounds normal. No accessory muscle usage. No respiratory distress. He exhibits tenderness (with palpation ).   Abdominal: He exhibits no distension. There is no tenderness.   Musculoskeletal: He exhibits no edema.   Neurological: He is alert and oriented to person, place, and time.   Skin: Skin is warm and dry. No rash noted. He is not diaphoretic.   Psychiatric: He has a normal mood and affect. His speech is normal and behavior is normal.   Vitals reviewed.       Results Review:   Lab Results   Component Value Date    GLUCOSE 117 (H) 02/02/2017    CALCIUM 9.7 02/02/2017     02/02/2017    K 4.5 02/02/2017    CO2 28.0 02/02/2017    CL 97 (L) 02/02/2017    BUN 21 02/02/2017    CREATININE 1.06 02/02/2017    EGFRIFNONA 72 02/02/2017    BCR 19.8 02/02/2017    ANIONGAP 10.0 02/02/2017     Lab Results   Component Value Date    WBC 6.53 02/01/2017    HGB 13.3 (L) 02/01/2017    HCT 40.2 02/01/2017    MCV 90.1 02/01/2017     (L) 02/01/2017     Lab Results   Component Value Date    CKTOTAL 92 01/17/2017    CKMB 1.37 01/17/2017    TROPONINI <0.012 02/01/2017          Echo EF Estimated  Lab Results   Component Value Date    ECHOEFEST 55 01/17/2017           Medication Review: yes  Current Facility-Administered Medications   Medication Dose Route Frequency Provider Last Rate Last Dose   • acetaminophen (TYLENOL) tablet 650 mg  650 mg Oral Q6H PRN Cyril Sue MD       • albuterol (PROVENTIL) nebulizer solution 0.083% 2.5 mg/3mL  2.5 mg Nebulization Q6H PRN Cyril Sue MD       • ammonium lactate (AMLACTIN) 12 % cream   Topical BID PRN Cyril Sue MD       • aspirin chewable tablet 81 mg  81 mg Oral Daily TEENA Borges   81 mg at 02/03/17 0914   • clopidogrel (PLAVIX) tablet 75 mg  75 mg Oral Daily Cyril Sue MD   75 mg at 02/03/17 0913   • colchicine  tablet 0.6 mg  0.6 mg Oral Q12H TEENA Borges   0.6 mg at 02/03/17 2157   • dextrose (D50W) solution 25 g  25 g Intravenous Q15 Min PRN Cyril Sue MD       • dextrose (GLUTOSE) oral gel 15 g  15 g Oral Q15 Min PRN Cyril Sue MD       • enoxaparin (LOVENOX) syringe 40 mg  40 mg Subcutaneous Daily Wade Ramey MD   40 mg at 02/03/17 0923   • fluticasone (FLONASE) 50 MCG/ACT nasal spray 2 spray  2 spray Each Nare Daily Cyril Sue MD   2 spray at 02/02/17 0852   • glipiZIDE (GLUCOTROL) tablet 10 mg  10 mg Oral BID AC Cyril Sue MD   10 mg at 02/03/17 1723   • glucagon (human recombinant) (GLUCAGEN DIAGNOSTIC) injection 1 mg  1 mg Subcutaneous Q15 Min PRN Cyril Sue MD       • hydrALAZINE (APRESOLINE) tablet 25 mg  25 mg Oral Q12H Cyril Sue MD   25 mg at 02/03/17 0915   • insulin detemir (LEVEMIR) injection 20 Units  20 Units Subcutaneous Nightly Cyril Sue MD   20 Units at 02/03/17 2155   • insulin lispro (humaLOG) injection 2-7 Units  2-7 Units Subcutaneous 4x Daily AC & at Bedtime Cyril Sue MD   3 Units at 02/03/17 1723   • isosorbide dinitrate (ISORDIL) tablet 20 mg  20 mg Oral Q12H Cyril Sue MD   20 mg at 02/03/17 2157   • ketoconazole (NIZORAL) 2 % cream   Topical Q12H Cyril Sue MD       • lactobacillus acidophilus (RISAQUAD) capsule 1 capsule  1 capsule Oral Daily Cyril Sue MD   1 capsule at 02/03/17 0914   • lamoTRIgine (LaMICtal) tablet 200 mg  200 mg Oral Q12H Cyril Sue MD   200 mg at 02/04/17 0006   • levETIRAcetam (KEPPRA) tablet 2,000 mg  2,000 mg Oral Q12H Cyril Sue MD   2,000 mg at 02/03/17 2157   • lisinopril (PRINIVIL,ZESTRIL) tablet 20 mg  20 mg Oral Q24H Cyril Sue MD   20 mg at 02/03/17 0913   • methocarbamol (ROBAXIN) tablet 750 mg  750 mg Oral 4x Daily Cyril Sue MD   750 mg at 02/03/17 2201   • metoprolol tartrate (LOPRESSOR) tablet 50 mg  50 mg Oral Q12H Cyril Sue MD   50 mg at 02/03/17 2157   • naproxen (NAPROSYN) tablet 500 mg   500 mg Oral BID With Meals Wade Ramey MD   500 mg at 02/04/17 0006   • nitroglycerin (NITROSTAT) SL tablet 0.4 mg  0.4 mg Sublingual Q5 Min PRN Cyril Sue MD   0.4 mg at 02/03/17 1908   • pantoprazole (PROTONIX) EC tablet 40 mg  40 mg Oral Q AM Cyril Sue MD   40 mg at 02/04/17 0548   • polyvinyl alcohol-povidone (HYPOTEARS) 1.4-0.6 % ophthalmic solution 1 drop  1 drop Both Eyes 4x Daily PRN Cyril Sue MD       • promethazine (PHENERGAN) tablet 25 mg  25 mg Oral Q6H PRN Cyril Sue MD   25 mg at 02/02/17 0025   • psyllium (METAMUCIL MULTIHEALTH FIBER) 58.12 % packet 1 packet  1 packet Oral Daily Cyril Sue MD   1 packet at 02/03/17 0912   • rosuvastatin (CRESTOR) tablet 40 mg  40 mg Oral Nightly Wade Ramey MD   40 mg at 02/04/17 0006   • triamterene-hydrochlorothiazide (MAXZIDE) 75-50 MG per tablet 0.5 tablet  0.5 tablet Oral Daily Cyril Sue MD   0.5 tablet at 02/03/17 0912         Assessment/Plan     Principal Problem:  -Chest pain- chest wall pain which is also provoked by exertion. Known  small diagonal per 1/2016 cath. Lexiscan revealed Left ventricular ejection fraction is is 63%, myocardial perfusion imaging indicates a small-sized area of ischemia located in the lateral wall- consistent with a low risk study.    Active Problems:    -Coronary artery disease involving native coronary artery of native heart with angina pectoris    -Type 2 diabetes mellitus without complication    -Gastroesophageal reflux disease without esophagitis    -Essential hypertension    -Seizure disorder    -Mixed hyperlipidemia      Plan   1. Monitor telemetry  2. Ambulate  3. Continue Colchicine and short term NSAIDS   4. Healthy heart diet  5. VTE Lovenox   6. Possible d/c home later today or in the am. Possible outpatient LHC per Dr. Ramey.     Lorenza Santa, APRN  02/04/17  8:08 AM

## 2017-02-04 NOTE — DISCHARGE SUMMARY
[facility] HEART GROUP DISCHARGE    Date of Admission: 2/1/2017   Date of Discharge:  2/4/2017    Attending: Dr. Wade Ramey     Discharge Diagnosis:   Principal Problem:  -Chest pain- chest wall pain which is also provoked by exertion. Known  small diagonal per 1/2016 cath. Lexiscan revealed Left ventricular ejection fraction is is 63%, myocardial perfusion imaging indicates a small-sized area of ischemia located in the lateral wall- consistent with a low risk study.     Active Problems:  -Coronary artery disease involving native coronary artery of native heart with angina pectoris  -Type 2 diabetes mellitus without complication  -Gastroesophageal reflux disease without esophagitis  -Essential hypertension  -Seizure disorder  -Mixed hyperlipidemia       Hospital Course  Patient is a 56 y.o. male who presented to [facility] with a complaint of chest pain on 2/1/17, admitted to  telemetry under the care of Dr. Ramey. Chest pain appeared atypical for cardiac pain due to significant chest tenderness with palpation. Troponin negative x3, no acute ECG changes noted. Patient recently presented to [facility] ED with similar complaints and he had a low risk for ischemia DSE and normal barium swallow at that time. Dr. Ramey did order a Lexiscan during this hospital admission considering his significant PMH for CAD (see below). Dr. Ramey started Colchicine as well as a short course of NSAIDS for chest wall pain. Patient is ready for discharge on 2/4/17 with plans for close PCP and Dr. Ramey follow up. Will discuss possible repeat cardiac cath at Cardiology follow up.     Procedures Performed:  · 2/3/17- lexiscan: Left ventricular ejection fraction is is 63%  · Myocardial perfusion imaging indicates a small-sized area of ischemia located in the lateral wall.   Impressions are consistent with a low risk study.         Pertinent Test Results:   Lab Results   Component Value Date    WBC 6.53 02/01/2017    HGB  13.3 (L) 02/01/2017    HCT 40.2 02/01/2017    MCV 90.1 02/01/2017     (L) 02/01/2017     Lab Results   Component Value Date    GLUCOSE 117 (H) 02/02/2017    CALCIUM 9.7 02/02/2017     02/02/2017    K 4.5 02/02/2017    CO2 28.0 02/02/2017    CL 97 (L) 02/02/2017    BUN 21 02/02/2017    CREATININE 1.06 02/02/2017    EGFRIFNONA 72 02/02/2017    BCR 19.8 02/02/2017    ANIONGAP 10.0 02/02/2017     Lab Results   Component Value Date    CKTOTAL 92 01/17/2017    CKMB 1.37 01/17/2017    TROPONINI <0.012 02/01/2017       Condition on Discharge:  Stable     Physical Exam at Discharge  Patient Vitals for the past 24 hrs:   BP Temp Temp src Pulse Resp SpO2 Weight   02/04/17 0820 114/56 - - 63 - - -   02/04/17 0747 119/79 98.9 °F (37.2 °C) Temporal Art 57 18 98 % -   02/03/17 2012 111/66 98.5 °F (36.9 °C) Temporal Art 65 20 96 % 246 lb 5 oz (112 kg)   02/03/17 1914 98/57 - - - - - -   02/03/17 1908 123/75 - - - - - -   02/03/17 1500 128/85 97.7 °F (36.5 °C) Temporal Art 76 20 97 % -     Physical Exam  See previously documented physical exam from progress note from 2/4/17.     Discharge Disposition  Home or Self Care    Discharge Medications   Carlos A Boyer Jr.   Home Medication Instructions BEN:[id number]    Printed on:02/04/17 1205   Medication Information                      albuterol (PROVENTIL HFA;VENTOLIN HFA) 108 (90 BASE) MCG/ACT inhaler  Inhale 2 puffs Every 6 (Six) Hours As Needed for wheezing.             ammonium lactate (AMLACTIN) 12 % cream  Apply 1 application topically 2 (Two) Times a Day As Needed for dry skin.             aspirin 81 MG chewable tablet  Chew 1 tablet Daily.             carboxymethylcellulose (REFRESH PLUS) 0.5 % solution  1 drop 4 (Four) Times a Day As Needed for dry eyes.             clopidogrel (PLAVIX) 75 MG tablet  Take 75 mg by mouth Daily.             colchicine 0.6 MG tablet  Take 1 tablet by mouth Every 12 (Twelve) Hours.             FLUTICASONE PROPIONATE, NASAL, NA  50  mcg into each nostril Daily. 2 SPRAYS IN EACH NOSTRIL             glipiZIDE (GLUCOTROL) 10 MG tablet  Take 20 mg by mouth 2 (Two) Times a Day Before Meals.             hydrALAZINE (APRESOLINE) 25 MG tablet  Take 25 mg by mouth 2 (Two) Times a Day.             insulin glargine (LANTUS) 100 UNIT/ML injection  Inject 20 Units under the skin Daily.             isosorbide dinitrate (ISORDIL) 20 MG tablet  Take 1 tablet by mouth 2 (Two) Times a Day.             ketoconazole (NIZORAL) 2 % cream  Apply 1 application topically 2 (Two) Times a Day.             LACTOBACILLUS PO  Take  by mouth Daily.             lamoTRIgine (LaMICtal) 100 MG tablet  Take 200 mg by mouth 2 (Two) Times a Day.             levETIRAcetam (KEPPRA) 500 MG tablet  Take 2,000 mg by mouth 2 (Two) Times a Day.             lisinopril (PRINIVIL,ZESTRIL) 20 MG tablet  Take 1 tablet by mouth Daily.             metFORMIN (GLUCOPHAGE) 1000 MG tablet  Take 1,000 mg by mouth 2 (Two) Times a Day With Meals.             methocarbamol (ROBAXIN) 750 MG tablet  Take 750 mg by mouth 4 (Four) Times a Day As Needed for muscle spasms.             metoprolol tartrate (LOPRESSOR) 50 MG tablet  Take 1 tablet by mouth 2 (Two) Times a Day.             naproxen (NAPROSYN) 500 MG tablet  Take 1 tablet by mouth 2 (Two) Times a Day With Meals for 3 days.             nitroglycerin (NITROSTAT) 0.4 MG SL tablet  Place 0.4 mg under the tongue Every 5 (Five) Minutes As Needed for chest pain. Take no more than 3 doses in 15 minutes.             pantoprazole (PROTONIX) 40 MG EC tablet  Take 40 mg by mouth Daily.             promethazine (PHENERGAN) 50 MG tablet  Take 25 mg by mouth Every 6 (Six) Hours As Needed for nausea or vomiting.             psyllium (METAMUCIL) 58.6 % packet  Take  by mouth Daily. 1 TABLESPOON             rosuvastatin (CRESTOR) 40 MG tablet  Take 20 mg by mouth Daily.             triamterene-hydrochlorothiazide (MAXZIDE) 75-50 MG per tablet  Take 0.5 tablets by  mouth Daily.             urea (CARMOL) 40 % ointment  Apply  topically Daily.                 Discharge Diet: Healthy Heart Diet     Activity at Discharge: Resume normal activity as tolerated.     Follow-up Appointments  Future Appointments  Date Time Provider Department Center   2/7/2017 10:00 AM Wade Ramey MD [facility] CD PAD None     Follow up with Dr. Ramey as previously scheduled on 2/7/17  to discuss repeat cardiac cath.          TEENA Borges  02/04/17  12:05 PM    Time: Discharge 30 min

## 2017-02-04 NOTE — PLAN OF CARE
Problem: Patient Care Overview (Adult)  Goal: Plan of Care Review  Outcome: Ongoing (interventions implemented as appropriate)    02/03/17 7821   Coping/Psychosocial Response Interventions   Plan Of Care Reviewed With patient   Patient Care Overview   Progress improving   Outcome Evaluation   Outcome Summary/Follow up Plan Sahra essentially negative; medical mgmt; plan for dc in am. VSS sb/s 52-64 on tele, no new issues noted at this time       Goal: Adult Individualization and Mutuality  Outcome: Ongoing (interventions implemented as appropriate)  Goal: Discharge Needs Assessment  Outcome: Ongoing (interventions implemented as appropriate)    Problem: Acute Coronary Syndrome (ACS) (Adult)  Goal: Signs and Symptoms of Listed Potential Problems Will be Absent or Manageable (Acute Coronary Syndrome)  Outcome: Ongoing (interventions implemented as appropriate)

## 2017-02-04 NOTE — PLAN OF CARE
Problem: Patient Care Overview (Adult)  Goal: Plan of Care Review  Outcome: Ongoing (interventions implemented as appropriate)    02/04/17 0426   Coping/Psychosocial Response Interventions   Plan Of Care Reviewed With patient   Patient Care Overview   Progress improving   Outcome Evaluation   Outcome Summary/Follow up Plan VSS, SB/S 55-79, no new issues plan to DC this am.         Problem: Acute Coronary Syndrome (ACS) (Adult)  Goal: Signs and Symptoms of Listed Potential Problems Will be Absent or Manageable (Acute Coronary Syndrome)  Outcome: Ongoing (interventions implemented as appropriate)    02/04/17 0426   Acute Coronary Syndrome (ACS)   Problems Assessed (Acute Coronary Syndrome (ACS)) all   Problems Present (Acute Coronary Syndrome (ACS)) situational response

## 2017-02-07 ENCOUNTER — OFFICE VISIT (OUTPATIENT)
Dept: CARDIOLOGY | Facility: CLINIC | Age: 57
End: 2017-02-07

## 2017-02-07 VITALS
WEIGHT: 250.8 LBS | HEART RATE: 70 BPM | HEIGHT: 72 IN | SYSTOLIC BLOOD PRESSURE: 110 MMHG | BODY MASS INDEX: 33.97 KG/M2 | DIASTOLIC BLOOD PRESSURE: 82 MMHG | OXYGEN SATURATION: 98 %

## 2017-02-07 DIAGNOSIS — I25.119 CORONARY ARTERY DISEASE INVOLVING NATIVE CORONARY ARTERY OF NATIVE HEART WITH ANGINA PECTORIS (HCC): ICD-10-CM

## 2017-02-07 DIAGNOSIS — E78.2 MIXED HYPERLIPIDEMIA: ICD-10-CM

## 2017-02-07 DIAGNOSIS — G40.909 SEIZURE DISORDER (HCC): ICD-10-CM

## 2017-02-07 DIAGNOSIS — K21.9 GASTROESOPHAGEAL REFLUX DISEASE WITHOUT ESOPHAGITIS: ICD-10-CM

## 2017-02-07 DIAGNOSIS — I10 ESSENTIAL HYPERTENSION: Primary | ICD-10-CM

## 2017-02-07 DIAGNOSIS — Z79.4 TYPE 2 DIABETES MELLITUS WITHOUT COMPLICATION, WITH LONG-TERM CURRENT USE OF INSULIN (HCC): ICD-10-CM

## 2017-02-07 DIAGNOSIS — R07.9 CHEST PAIN, UNSPECIFIED: ICD-10-CM

## 2017-02-07 DIAGNOSIS — E11.9 TYPE 2 DIABETES MELLITUS WITHOUT COMPLICATION, WITH LONG-TERM CURRENT USE OF INSULIN (HCC): ICD-10-CM

## 2017-02-07 PROCEDURE — 93000 ELECTROCARDIOGRAM COMPLETE: CPT | Performed by: INTERNAL MEDICINE

## 2017-02-07 PROCEDURE — 99214 OFFICE O/P EST MOD 30 MIN: CPT | Performed by: INTERNAL MEDICINE

## 2017-02-07 NOTE — PROGRESS NOTES
Carlos A Boyer Jr.  [id number]  1960  56 y.o.  male    Referring Provider: GINA Felder    Reason for Follow-up Visit: Chest pain    Overall doing well  Denies any recurrent exertional chest pain  No excessive shortness of breath  Compliant with medications    History of present illness:  Carlos A Boyer Jr. is a 56 y.o. yo male with history of chest pain who presents today for   Chief Complaint   Patient presents with   • Chest Pain     ER discharge   .    History  Past Medical History   Diagnosis Date   • Asthma    • Coronary artery disease    • Diabetes mellitus    • GERD (gastroesophageal reflux disease)    • Hyperlipidemia    • Hypertension    • Kidney stone    • Murmur, heart    • Myocardial infarction    • Seizures    • Stroke    ,   Past Surgical History   Procedure Laterality Date   • Hernia repair     • Kidney stone surgery     • Cardiac catheterization  01/2016     Dr. Broadbent; widely patent previously placed stents in the left anterior descending and obstructive disease involving the diagonal branch which was treated medically   • Coronary stent placement     • Coronary angioplasty     ,   Family History   Problem Relation Age of Onset   • Heart disease Father    • Heart disease Maternal Grandmother    ,   Social History   Substance Use Topics   • Smoking status: Former Smoker   • Smokeless tobacco: Former User     Types: Chew     Quit date: 2009   • Alcohol use No   ,     Medications  Current Outpatient Prescriptions   Medication Sig Dispense Refill   • albuterol (PROVENTIL HFA;VENTOLIN HFA) 108 (90 BASE) MCG/ACT inhaler Inhale 2 puffs Every 6 (Six) Hours As Needed for wheezing.     • ammonium lactate (AMLACTIN) 12 % cream Apply 1 application topically 2 (Two) Times a Day As Needed for dry skin.     • aspirin 81 MG chewable tablet Chew 1 tablet Daily.     • carboxymethylcellulose (REFRESH PLUS) 0.5 % solution 1 drop 4 (Four) Times a Day As Needed for dry eyes.     • clopidogrel  (PLAVIX) 75 MG tablet Take 75 mg by mouth Daily.     • colchicine 0.6 MG tablet Take 1 tablet by mouth Every 12 (Twelve) Hours. 42 tablet 0   • FLUTICASONE PROPIONATE, NASAL, NA 50 mcg into each nostril Daily. 2 SPRAYS IN EACH NOSTRIL     • glipiZIDE (GLUCOTROL) 10 MG tablet Take 20 mg by mouth 2 (Two) Times a Day Before Meals.     • hydrALAZINE (APRESOLINE) 25 MG tablet Take 25 mg by mouth 2 (Two) Times a Day.     • insulin glargine (LANTUS) 100 UNIT/ML injection Inject 20 Units under the skin Daily.     • isosorbide dinitrate (ISORDIL) 20 MG tablet Take 1 tablet by mouth 2 (Two) Times a Day. 60 tablet 0   • ketoconazole (NIZORAL) 2 % cream Apply 1 application topically 2 (Two) Times a Day.     • LACTOBACILLUS PO Take  by mouth Daily.     • lamoTRIgine (LaMICtal) 100 MG tablet Take 200 mg by mouth 2 (Two) Times a Day.     • levETIRAcetam (KEPPRA) 500 MG tablet Take 2,000 mg by mouth 2 (Two) Times a Day.     • lisinopril (PRINIVIL,ZESTRIL) 20 MG tablet Take 1 tablet by mouth Daily.     • metFORMIN (GLUCOPHAGE) 1000 MG tablet Take 1,000 mg by mouth 2 (Two) Times a Day With Meals.     • methocarbamol (ROBAXIN) 750 MG tablet Take 750 mg by mouth 4 (Four) Times a Day As Needed for muscle spasms.     • metoprolol tartrate (LOPRESSOR) 50 MG tablet Take 1 tablet by mouth 2 (Two) Times a Day. 60 tablet 0   • naproxen (NAPROSYN) 500 MG tablet Take 1 tablet by mouth 2 (Two) Times a Day With Meals for 3 days. 6 tablet 0   • nitroglycerin (NITROSTAT) 0.4 MG SL tablet Place 0.4 mg under the tongue Every 5 (Five) Minutes As Needed for chest pain. Take no more than 3 doses in 15 minutes.     • pantoprazole (PROTONIX) 40 MG EC tablet Take 40 mg by mouth Daily.     • promethazine (PHENERGAN) 50 MG tablet Take 25 mg by mouth Every 6 (Six) Hours As Needed for nausea or vomiting.     • psyllium (METAMUCIL) 58.6 % packet Take  by mouth Daily. 1 TABLESPOON     • rosuvastatin (CRESTOR) 40 MG tablet Take 20 mg by mouth Daily.     •  "triamterene-hydrochlorothiazide (MAXZIDE) 75-50 MG per tablet Take 0.5 tablets by mouth Daily.     • urea (CARMOL) 40 % ointment Apply  topically Daily.       No current facility-administered medications for this visit.        Allergies:  Atorvastatin; Flagyl [metronidazole]; and Ciprofloxacin    Review of Systems  Review of Systems   Constitution: Negative.   HENT: Negative.    Eyes: Negative.    Cardiovascular: Positive for chest pain and dyspnea on exertion. Negative for claudication, cyanosis, irregular heartbeat, leg swelling, near-syncope, orthopnea, palpitations, paroxysmal nocturnal dyspnea and syncope.   Respiratory: Negative.    Endocrine: Negative.    Hematologic/Lymphatic: Negative.    Skin: Negative.    Gastrointestinal: Negative for anorexia.   Genitourinary: Negative.    Neurological: Negative.    Psychiatric/Behavioral: Negative.        Objective     Physical Exam:  Visit Vitals   • /82 (BP Location: Left arm, Patient Position: Sitting, Cuff Size: Adult)   • Pulse 70   • Ht 72\" (182.9 cm)   • Wt 250 lb 12.8 oz (114 kg)   • SpO2 98%   • BMI 34.01 kg/m2     Physical Exam   Constitutional: He appears well-developed.   HENT:   Head: Normocephalic.   Neck: Normal carotid pulses and no JVD present. No tracheal tenderness present. Carotid bruit is not present. No tracheal deviation and no edema present.   Cardiovascular: Regular rhythm, normal heart sounds and normal pulses.    Pulmonary/Chest: Effort normal. No stridor.   Abdominal: Soft.   Neurological: He is alert. He has normal strength. No cranial nerve deficit or sensory deficit.   Skin: Skin is warm.   Psychiatric: He has a normal mood and affect. His speech is normal and behavior is normal.       Results Review:       ECG 12 Lead  Date/Time: 2/7/2017 11:11 AM  Performed by: ZULEIKA DOYLE  Authorized by: ZULEIKA DOYLE   Comparison: compared with previous ECG from 2/2/2017  Rhythm: sinus rhythm  Rate: normal  QRS axis: normal  Q waves: V1 and " V2  Clinical impression: abnormal ECG            Assessment/Plan   Patient Active Problem List   Diagnosis   • Hypotension   • Diarrhea due to drug   • Syncope and collapse   • Type 2 diabetes mellitus without complication   • Gastroesophageal reflux disease without esophagitis   • Essential hypertension   • Seizure disorder   • Carotid disease, bilateral   • Coronary artery disease involving native coronary artery of native heart with angina pectoris   • Chest pain, unspecified   • Mixed hyperlipidemia       No palpitations. No significant pedal edema. Compliant with medications and diet. Latest labs and medications reviewed.    Plan:  As no particular exertional chest pain and low risk nuclear stress test with known diagonal small vessel CAD will manage medically  If he gets exertional chest pain or worsening symptoms will do cardiac cath then  See me in 3 month  Close follow up with you as scheduled.  Intensive factor modifications.  See order list.    Counseled regarding disease appropriate diet, fluid, caffeine, stimulants and sodium intake as well as importance of compliance to diet, exercise and regular follow up.  Avoid NSAIDS and COX2 inhibitors. Use Acetaminophen PRN.    Return in about 3 months (around 5/7/2017).

## 2017-04-21 ENCOUNTER — OFFICE VISIT (OUTPATIENT)
Dept: NEUROLOGY | Facility: CLINIC | Age: 57
End: 2017-04-21

## 2017-04-21 VITALS
SYSTOLIC BLOOD PRESSURE: 132 MMHG | BODY MASS INDEX: 34.95 KG/M2 | WEIGHT: 258 LBS | HEART RATE: 78 BPM | RESPIRATION RATE: 20 BRPM | HEIGHT: 72 IN | DIASTOLIC BLOOD PRESSURE: 74 MMHG

## 2017-04-21 DIAGNOSIS — I65.23 BILATERAL CAROTID ARTERY STENOSIS: ICD-10-CM

## 2017-04-21 DIAGNOSIS — G40.319 INTRACTABLE GENERALIZED IDIOPATHIC EPILEPSY WITHOUT STATUS EPILEPTICUS (HCC): Primary | ICD-10-CM

## 2017-04-21 DIAGNOSIS — G47.33 OSA (OBSTRUCTIVE SLEEP APNEA): ICD-10-CM

## 2017-04-21 DIAGNOSIS — I10 ESSENTIAL (PRIMARY) HYPERTENSION: ICD-10-CM

## 2017-04-21 PROCEDURE — 99204 OFFICE O/P NEW MOD 45 MIN: CPT | Performed by: PSYCHIATRY & NEUROLOGY

## 2017-04-21 RX ORDER — LAMOTRIGINE 200 MG/1
200 TABLET ORAL 2 TIMES DAILY
COMMUNITY
End: 2017-04-21 | Stop reason: SDUPTHER

## 2017-04-21 RX ORDER — LEVETIRACETAM 500 MG/1
TABLET ORAL
Qty: 215 TABLET | Refills: 4 | Status: SHIPPED | OUTPATIENT
Start: 2017-04-21 | End: 2017-08-17 | Stop reason: SDUPTHER

## 2017-04-21 RX ORDER — PANTOPRAZOLE SODIUM 40 MG/1
40 TABLET, DELAYED RELEASE ORAL 2 TIMES DAILY
COMMUNITY

## 2017-04-21 RX ORDER — LISINOPRIL 40 MG/1
20 TABLET ORAL NIGHTLY
Status: ON HOLD | COMMUNITY
End: 2019-07-10 | Stop reason: SDUPTHER

## 2017-04-21 RX ORDER — ETODOLAC 400 MG/1
400 TABLET, FILM COATED ORAL 2 TIMES DAILY PRN
COMMUNITY
End: 2017-10-05 | Stop reason: ALTCHOICE

## 2017-04-21 RX ORDER — LEVETIRACETAM 500 MG/1
500 TABLET ORAL
COMMUNITY
End: 2017-04-21 | Stop reason: SDUPTHER

## 2017-04-21 RX ORDER — ACARBOSE 50 MG/1
50 TABLET ORAL
COMMUNITY
End: 2017-10-05 | Stop reason: ALTCHOICE

## 2017-04-21 RX ORDER — TAMSULOSIN HYDROCHLORIDE 0.4 MG/1
1 CAPSULE ORAL NIGHTLY
COMMUNITY
End: 2017-11-28

## 2017-04-21 RX ORDER — LAMOTRIGINE 200 MG/1
200 TABLET ORAL 2 TIMES DAILY
Qty: 60 TABLET | Refills: 4 | Status: SHIPPED | OUTPATIENT
Start: 2017-04-21 | End: 2017-08-17 | Stop reason: SDUPTHER

## 2017-04-21 NOTE — PATIENT INSTRUCTIONS
Patient not to be driving until released.  Safety precautions: Takes showers not baths.  No swimming by self.  No getting in hot tub by self.  Patient to be not using sharp cutting tools.  Patient not to work over hot fires or water.  No climbing.

## 2017-04-21 NOTE — PROGRESS NOTES
Subjective   Carlos A Boyer Jr., 1960, is a male who is being seen today for   Chief Complaint   Patient presents with   • Seizures     first dx May 2012.       HISTORY OF PRESENT ILLNESS: Patient seen for generalized epilepsy.  Patient several years ago was worked up at [place] with episodes of shaking.  Patient at that time had generalized paroxysmal activity on prolonged monitoring.  Patient has most recently been on Keppra 500 mg, taking 3 by mouth every morning and 4 by mouth daily at bedtime.  Apparently on March 3 per Dr. Roa , a Keppra level was 51 and according to the patient later it was lower after he changed dosages however there was some question of reliability of this.  Patient also is on Lamictal 200 mg by mouth twice a day and Lamictal level again in early March was 5.1.  Patient says in the last month his had one spell of his usual activity of shaking/twitching without tongue biting or incontinence and without loss of consciousness.  Patient says it feels like sometimes a tremoring on the inside that goes to the outside bilaterally.  Patient says the last 2-3 minutes.  There is also a history of carotid stenosis 50-69% bilaterally with apparently has not been checked recently.  Patient also has history of heart disease and multiple stents lace but none in the last year.  Patient had an MRI of the brain done also at [place] in 2012 which I have a CD of her reviewed that.  Patient MRI brain showing moderate chronic ischemic white matter changes otherwise no intracranial abnormality noted.  Patient has headaches about every 2-3 months characterizes bifrontal/ behind the left eye usually without warning but with nausea , no vision changes.  Patient has had no history of head trauma    REVIEW OF SYSTEMS:   GENERAL: No fever chills  PULMONARY: Patient on CPAP  CVS:  As above  GASTROINTESTINAL: No acute GI distress  GENITOURINARY: No acute  distress  GYN: Not applicable  MUSCULOSKELETAL: No  acute musculoskeletal symptoms  HEENT:  No acute vision or hearing change  ENDOCRINE:  Patient is diabetic  PSYCHIATRIC: No acute psychiatric symptoms  HEMATOLOGY: No anemia  SKIN: Patient has chronic dry skin  Family history: Heart disease  Social history: Patient denies smoking or drug use or alcohol use.      PHYSICAL EXAMINATION:    GENERAL: No acute distress  CRANIUM: Normocephalic/atraumatic  HEENT: No acute fundic abnormalities.  Pupils equal round reactive to light.       EYES: Fields full to confrontation.  EOMs intact without nystagmus.       EARS:  Tympanic membranes normal and hears tuning fork bilaterally.       THROAT: No oropharynx abnormalities       NECK:  No tympanic membrane abnormalities  CHEST: No acute cardiopulmonary abnormalities by auscultation but patient says he has chronic  murmur  ABDOMEN: Nondistended  EXTREMITIES: Pulses symmetrical  NEURO: Patient alert and follows commands without difficulty  SPEECH:  Speech normal    CRANIAL NERVES:  Motor sensory about the face normal and symmetric    MOTOR STRENGTH:  Motor strength upper and lower extremities normal  STATION AND GAIT:  Gait is normal/Romberg negative  CEREBELLAR:  No abnormalities finger nose or heel shin  SENSORY:  Decreased pin and vibration distal to proximal in lower extremities to above the ankles bilaterally.  REFLEXES:  Reflexes present and symmetric upper and lower extremities without Babinski's except for decreased ankle jerks bilaterally.      ASSESSMENT AND PLAN:  History of epilepsy and continuing present medication getting appropriate blood work.  We are doing MRI brain and EEG with no HV or photic.  Patient to get overnight continuous oximetry on his Pap device.  Patient to get carotid studies for his carotid stenosis.  He is taking an aspirin a day and his statin.  Patient to get with PCP about weight and diet control.      Carlos A was seen today for seizures.    Diagnoses and all orders for this visit:    Intractable  generalized idiopathic epilepsy without status epilepticus  -     EEG; Future  -     Levetiracetam Level (Keppra); Future  -     Lamotrigine Level; Future  -     MRI Brain Without Contrast; Future  -     CBC & Differential; Future  -     Comprehensive Metabolic Panel; Future  -     Lipid Panel; Future  -     Magnesium; Future  -     Sedimentation Rate; Future  -     T4, Free; Future  -     Vitamin B12; Future  -     Folate; Future    Bilateral carotid artery stenosis  -     US Carotid Bilateral; Future    HOA (obstructive sleep apnea)  -     Overnight Sleep Oximetry Study; Future    Essential (primary) hypertension   -     Lipid Panel; Future  -     T4, Free; Future

## 2017-04-28 DIAGNOSIS — G40.319 INTRACTABLE GENERALIZED IDIOPATHIC EPILEPSY WITHOUT STATUS EPILEPTICUS (HCC): ICD-10-CM

## 2017-04-28 DIAGNOSIS — I10 ESSENTIAL (PRIMARY) HYPERTENSION: ICD-10-CM

## 2017-05-04 ENCOUNTER — OFFICE VISIT (OUTPATIENT)
Dept: UROLOGY | Facility: CLINIC | Age: 57
End: 2017-05-04

## 2017-05-04 VITALS
TEMPERATURE: 97.7 F | WEIGHT: 255.4 LBS | HEIGHT: 72 IN | BODY MASS INDEX: 34.59 KG/M2 | DIASTOLIC BLOOD PRESSURE: 80 MMHG | SYSTOLIC BLOOD PRESSURE: 140 MMHG

## 2017-05-04 DIAGNOSIS — Z87.442 HISTORY OF KIDNEY STONES: ICD-10-CM

## 2017-05-04 DIAGNOSIS — N40.1 BENIGN NON-NODULAR PROSTATIC HYPERPLASIA WITH LOWER URINARY TRACT SYMPTOMS: Primary | ICD-10-CM

## 2017-05-04 LAB
BILIRUB BLD-MCNC: NEGATIVE MG/DL
CLARITY, POC: CLEAR
COLOR UR: YELLOW
GLUCOSE UR STRIP-MCNC: NEGATIVE MG/DL
KETONES UR QL: NEGATIVE
LEUKOCYTE EST, POC: NEGATIVE
NITRITE UR-MCNC: NEGATIVE MG/ML
PH UR: 6.5 [PH] (ref 5–8)
PROT UR STRIP-MCNC: NEGATIVE MG/DL
PSA SERPL-MCNC: 0.56 NG/ML (ref 0–4)
RBC # UR STRIP: NEGATIVE /UL
SP GR UR: 1.02 (ref 1–1.03)
UROBILINOGEN UR QL: NORMAL

## 2017-05-04 PROCEDURE — 51798 US URINE CAPACITY MEASURE: CPT | Performed by: UROLOGY

## 2017-05-04 PROCEDURE — 99204 OFFICE O/P NEW MOD 45 MIN: CPT | Performed by: UROLOGY

## 2017-05-04 PROCEDURE — 81003 URINALYSIS AUTO W/O SCOPE: CPT | Performed by: UROLOGY

## 2017-05-04 RX ORDER — TRIAMTERENE AND HYDROCHLOROTHIAZIDE 75; 50 MG/1; MG/1
0.5 TABLET ORAL DAILY
COMMUNITY
End: 2019-07-10 | Stop reason: HOSPADM

## 2017-05-04 RX ORDER — FINASTERIDE 5 MG/1
5 TABLET, FILM COATED ORAL DAILY
Qty: 30 TABLET | Refills: 11 | Status: SHIPPED | OUTPATIENT
Start: 2017-05-04 | End: 2017-05-19

## 2017-05-05 ENCOUNTER — HOSPITAL ENCOUNTER (OUTPATIENT)
Dept: MRI IMAGING | Facility: HOSPITAL | Age: 57
Discharge: HOME | End: 2017-05-05
Attending: PSYCHIATRY & NEUROLOGY | Admitting: PSYCHIATRY & NEUROLOGY

## 2017-05-05 DIAGNOSIS — G40.319 INTRACTABLE GENERALIZED IDIOPATHIC EPILEPSY WITHOUT STATUS EPILEPTICUS (HCC): ICD-10-CM

## 2017-05-05 PROCEDURE — 70551 MRI BRAIN STEM W/O DYE: CPT

## 2017-05-08 ENCOUNTER — TELEPHONE (OUTPATIENT)
Dept: NEUROLOGY | Facility: CLINIC | Age: 57
End: 2017-05-08

## 2017-05-12 ENCOUNTER — OFFICE VISIT (OUTPATIENT)
Dept: CARDIOLOGY | Facility: CLINIC | Age: 57
End: 2017-05-12

## 2017-05-12 VITALS
BODY MASS INDEX: 34.4 KG/M2 | HEIGHT: 72 IN | DIASTOLIC BLOOD PRESSURE: 94 MMHG | SYSTOLIC BLOOD PRESSURE: 132 MMHG | WEIGHT: 254 LBS | HEART RATE: 75 BPM | OXYGEN SATURATION: 98 %

## 2017-05-12 DIAGNOSIS — I77.9 CAROTID DISEASE, BILATERAL (HCC): ICD-10-CM

## 2017-05-12 DIAGNOSIS — G40.909 SEIZURE DISORDER (HCC): ICD-10-CM

## 2017-05-12 DIAGNOSIS — E11.9 TYPE 2 DIABETES MELLITUS WITHOUT COMPLICATION, WITH LONG-TERM CURRENT USE OF INSULIN (HCC): ICD-10-CM

## 2017-05-12 DIAGNOSIS — K21.9 GASTROESOPHAGEAL REFLUX DISEASE WITHOUT ESOPHAGITIS: ICD-10-CM

## 2017-05-12 DIAGNOSIS — Z79.4 TYPE 2 DIABETES MELLITUS WITHOUT COMPLICATION, WITH LONG-TERM CURRENT USE OF INSULIN (HCC): ICD-10-CM

## 2017-05-12 DIAGNOSIS — I10 ESSENTIAL HYPERTENSION: Primary | ICD-10-CM

## 2017-05-12 DIAGNOSIS — I25.119 CORONARY ARTERY DISEASE INVOLVING NATIVE CORONARY ARTERY OF NATIVE HEART WITH ANGINA PECTORIS (HCC): ICD-10-CM

## 2017-05-12 PROCEDURE — 99214 OFFICE O/P EST MOD 30 MIN: CPT | Performed by: INTERNAL MEDICINE

## 2017-05-12 RX ORDER — RANOLAZINE 500 MG/1
500 TABLET, EXTENDED RELEASE ORAL 2 TIMES DAILY
Qty: 60 TABLET | Refills: 11 | Status: SHIPPED | OUTPATIENT
Start: 2017-05-12 | End: 2017-10-05 | Stop reason: ALTCHOICE

## 2017-05-17 ENCOUNTER — HOSPITAL ENCOUNTER (OUTPATIENT)
Dept: NEUROLOGY | Facility: HOSPITAL | Age: 57
Discharge: HOME | End: 2017-05-17
Attending: PSYCHIATRY & NEUROLOGY | Admitting: PSYCHIATRY & NEUROLOGY

## 2017-05-17 DIAGNOSIS — G40.319 INTRACTABLE GENERALIZED IDIOPATHIC EPILEPSY WITHOUT STATUS EPILEPTICUS (HCC): ICD-10-CM

## 2017-05-17 PROCEDURE — 95816 EEG AWAKE AND DROWSY: CPT

## 2017-05-17 PROCEDURE — 95819 EEG AWAKE AND ASLEEP: CPT | Performed by: PSYCHIATRY & NEUROLOGY

## 2017-05-19 ENCOUNTER — OFFICE VISIT (OUTPATIENT)
Dept: NEUROLOGY | Facility: CLINIC | Age: 57
End: 2017-05-19

## 2017-05-19 VITALS
SYSTOLIC BLOOD PRESSURE: 132 MMHG | HEART RATE: 68 BPM | DIASTOLIC BLOOD PRESSURE: 74 MMHG | WEIGHT: 258 LBS | BODY MASS INDEX: 34.95 KG/M2 | HEIGHT: 72 IN

## 2017-05-19 DIAGNOSIS — Z99.89 OSA ON CPAP: ICD-10-CM

## 2017-05-19 DIAGNOSIS — I77.9 CAROTID DISEASE, BILATERAL (HCC): ICD-10-CM

## 2017-05-19 DIAGNOSIS — E78.2 MIXED HYPERLIPIDEMIA: ICD-10-CM

## 2017-05-19 DIAGNOSIS — G40.909 SEIZURE DISORDER (HCC): Primary | ICD-10-CM

## 2017-05-19 DIAGNOSIS — G47.33 OSA ON CPAP: ICD-10-CM

## 2017-05-19 DIAGNOSIS — I69.30 CHRONIC LEFT ARTERIAL ISCHEMIC STROKE, ICA (INTERNAL CAROTID ARTERY): ICD-10-CM

## 2017-05-19 DIAGNOSIS — I10 ESSENTIAL HYPERTENSION: ICD-10-CM

## 2017-05-19 DIAGNOSIS — I25.119 CORONARY ARTERY DISEASE INVOLVING NATIVE CORONARY ARTERY OF NATIVE HEART WITH ANGINA PECTORIS (HCC): ICD-10-CM

## 2017-05-19 DIAGNOSIS — E11.9 TYPE 2 DIABETES MELLITUS WITHOUT COMPLICATION, WITH LONG-TERM CURRENT USE OF INSULIN (HCC): ICD-10-CM

## 2017-05-19 DIAGNOSIS — Z79.4 TYPE 2 DIABETES MELLITUS WITHOUT COMPLICATION, WITH LONG-TERM CURRENT USE OF INSULIN (HCC): ICD-10-CM

## 2017-05-19 PROBLEM — Z86.73 CHRONIC LEFT ARTERIAL ISCHEMIC STROKE, ICA (INTERNAL CAROTID ARTERY): Status: ACTIVE | Noted: 2017-05-19

## 2017-05-19 PROCEDURE — 99213 OFFICE O/P EST LOW 20 MIN: CPT | Performed by: CLINICAL NURSE SPECIALIST

## 2017-05-23 ENCOUNTER — TELEPHONE (OUTPATIENT)
Dept: NEUROLOGY | Facility: CLINIC | Age: 57
End: 2017-05-23

## 2017-05-23 DIAGNOSIS — R93.0 ABNORMAL MRI OF HEAD: ICD-10-CM

## 2017-05-23 DIAGNOSIS — G40.309 GENERALIZED SEIZURE DISORDER (HCC): Primary | ICD-10-CM

## 2017-05-24 DIAGNOSIS — G47.33 OSA (OBSTRUCTIVE SLEEP APNEA): ICD-10-CM

## 2017-05-25 ENCOUNTER — HOSPITAL ENCOUNTER (OUTPATIENT)
Dept: ULTRASOUND IMAGING | Facility: HOSPITAL | Age: 57
Discharge: HOME | End: 2017-05-25
Attending: PSYCHIATRY & NEUROLOGY | Admitting: PSYCHIATRY & NEUROLOGY

## 2017-05-25 DIAGNOSIS — I65.23 BILATERAL CAROTID ARTERY STENOSIS: ICD-10-CM

## 2017-05-25 PROCEDURE — 93880 EXTRACRANIAL BILAT STUDY: CPT | Performed by: SURGERY

## 2017-05-25 PROCEDURE — 93880 EXTRACRANIAL BILAT STUDY: CPT

## 2017-05-26 ENCOUNTER — TELEPHONE (OUTPATIENT)
Dept: NEUROLOGY | Facility: CLINIC | Age: 57
End: 2017-05-26

## 2017-07-05 ENCOUNTER — OFFICE VISIT (OUTPATIENT)
Dept: UROLOGY | Facility: CLINIC | Age: 57
End: 2017-07-05

## 2017-07-05 VITALS
TEMPERATURE: 97.8 F | BODY MASS INDEX: 34.27 KG/M2 | SYSTOLIC BLOOD PRESSURE: 140 MMHG | WEIGHT: 253 LBS | HEIGHT: 72 IN | DIASTOLIC BLOOD PRESSURE: 90 MMHG

## 2017-07-05 DIAGNOSIS — N40.1 BENIGN NON-NODULAR PROSTATIC HYPERPLASIA WITH LOWER URINARY TRACT SYMPTOMS: Primary | ICD-10-CM

## 2017-07-05 LAB
BILIRUB BLD-MCNC: NEGATIVE MG/DL
CLARITY, POC: CLEAR
COLOR UR: YELLOW
GLUCOSE UR STRIP-MCNC: ABNORMAL MG/DL
KETONES UR QL: NEGATIVE
LEUKOCYTE EST, POC: NEGATIVE
NITRITE UR-MCNC: NEGATIVE MG/ML
PH UR: 6.5 [PH] (ref 5–8)
PROT UR STRIP-MCNC: NEGATIVE MG/DL
RBC # UR STRIP: NEGATIVE /UL
SP GR UR: 1.02 (ref 1–1.03)
UROBILINOGEN UR QL: NORMAL

## 2017-07-05 PROCEDURE — 99213 OFFICE O/P EST LOW 20 MIN: CPT | Performed by: UROLOGY

## 2017-07-05 PROCEDURE — 81003 URINALYSIS AUTO W/O SCOPE: CPT | Performed by: UROLOGY

## 2017-07-05 PROCEDURE — 51798 US URINE CAPACITY MEASURE: CPT | Performed by: UROLOGY

## 2017-07-05 NOTE — PROGRESS NOTES
Mr. Boyer is 57 y.o. male    Chief Complaint   Patient presents with   • Benign Prostatic Hypertrophy       Benign Prostatic Hypertrophy   This is a chronic problem. The current episode started more than 1 year ago. The problem has been gradually worsening since onset. Irritative symptoms include frequency and urgency. Obstructive symptoms include an intermittent stream and a weak stream. Obstructive symptoms do not include straining. Pertinent negatives include no chills, dysuria or hematuria. AUA score is 20-35 (25). His sexual activity is non-contributory to the current illness. Nothing aggravates the symptoms. Past treatments include tamsulosin and finasteride. The treatment provided mild relief. He has been using treatment for 1 to 6 months.       The following portions of the patient's history were reviewed and updated as appropriate: allergies, current medications, past family history, past medical history, past social history, past surgical history and problem list.    Review of Systems   Constitutional: Negative for chills and fever.   Gastrointestinal: Negative for abdominal pain, anal bleeding and blood in stool.   Genitourinary: Positive for frequency and urgency. Negative for difficulty urinating, dysuria, flank pain and hematuria.         Current Outpatient Prescriptions:   •  insulin aspart (novoLOG) 100 UNIT/ML injection, Inject  under the skin 3 (Three) Times a Day Before Meals., Disp: , Rfl:   •  acarbose (PRECOSE) 50 MG tablet, Take 50 mg by mouth. Take 1/2 tab three times a day, Disp: , Rfl:   •  albuterol (PROVENTIL HFA;VENTOLIN HFA) 108 (90 BASE) MCG/ACT inhaler, Inhale 2 puffs Every 6 (Six) Hours As Needed for wheezing., Disp: , Rfl:   •  aspirin 81 MG chewable tablet, Chew 1 tablet Daily., Disp: , Rfl:   •  carboxymethylcellulose (REFRESH PLUS) 0.5 % solution, 1 drop 4 (Four) Times a Day As Needed for dry eyes., Disp: , Rfl:   •  etodolac (LODINE) 400 MG tablet, Take 400 mg by mouth 2 (Two)  Times a Day., Disp: , Rfl:   •  FLUTICASONE PROPIONATE, NASAL, NA, 50 mcg into each nostril Daily. 2 SPRAYS IN EACH NOSTRIL, Disp: , Rfl:   •  FOLIC ACID PO, Take  by mouth., Disp: , Rfl:   •  insulin glargine (LANTUS) 100 UNIT/ML injection, Inject 50 Units under the skin Daily., Disp: , Rfl:   •  isosorbide dinitrate (ISORDIL) 20 MG tablet, Take 1 tablet by mouth 2 (Two) Times a Day., Disp: 60 tablet, Rfl: 0  •  lamoTRIgine (LAMICTAL) 200 MG tablet, Take 1 tablet by mouth 2 (Two) Times a Day., Disp: 60 tablet, Rfl: 4  •  levETIRAcetam (KEPPRA) 500 MG tablet, Take 3 tablets in the morning and 4 tablets at bedtime, Disp: 215 tablet, Rfl: 4  •  lisinopril (PRINIVIL,ZESTRIL) 40 MG tablet, Take 40 mg by mouth. Take a 1/2 tab at bedtime by mouth, Disp: , Rfl:   •  metFORMIN (GLUCOPHAGE) 1000 MG tablet, Take 1,000 mg by mouth 2 (Two) Times a Day With Meals., Disp: , Rfl:   •  metoprolol tartrate (LOPRESSOR) 50 MG tablet, Take 1 tablet by mouth 2 (Two) Times a Day., Disp: 60 tablet, Rfl: 0  •  nitroglycerin (NITROSTAT) 0.4 MG SL tablet, Place 0.4 mg under the tongue Every 5 (Five) Minutes As Needed for chest pain. Take no more than 3 doses in 15 minutes., Disp: , Rfl:   •  pantoprazole (PROTONIX) 40 MG EC tablet, Take 40 mg by mouth 2 (Two) Times a Day., Disp: , Rfl:   •  psyllium (METAMUCIL) 58.6 % packet, Take  by mouth Daily. 1 TABLESPOON, Disp: , Rfl:   •  ranolazine (RANEXA) 500 MG 12 hr tablet, Take 1 tablet by mouth 2 (Two) Times a Day., Disp: 60 tablet, Rfl: 11  •  rosuvastatin (CRESTOR) 40 MG tablet, Take 20 mg by mouth Daily., Disp: , Rfl:   •  tamsulosin (FLOMAX) 0.4 MG capsule 24 hr capsule, Take 1 capsule by mouth Every Night., Disp: , Rfl:   •  triamterene-hydrochlorothiazide (MAXZIDE) 75-50 MG per tablet, Take 1 tablet by mouth Daily., Disp: , Rfl:   •  urea (CARMOL) 40 % ointment, Apply  topically Daily., Disp: , Rfl:     Past Medical History:   Diagnosis Date   • Asthma    • Coronary artery disease    •  "Diabetes mellitus    • GERD (gastroesophageal reflux disease)    • Hyperlipidemia    • Hypertension    • Kidney stone    • Murmur, heart    • Myocardial infarction    • Seizures    • Stroke        Past Surgical History:   Procedure Laterality Date   • CARDIAC CATHETERIZATION  01/2016    Dr. Broadbent; widely patent previously placed stents in the left anterior descending and obstructive disease involving the diagonal branch which was treated medically   • CORONARY ANGIOPLASTY     • CORONARY STENT PLACEMENT      Pt states 5 or 6 stents   • HERNIA REPAIR      x2   • KIDNEY STONE SURGERY     • KNEE SURGERY Right        Social History     Social History   • Marital status:      Spouse name: N/A   • Number of children: N/A   • Years of education: N/A     Social History Main Topics   • Smoking status: Former Smoker     Quit date: 1993   • Smokeless tobacco: Former User     Types: Chew     Quit date: 2009      Comment: Pt quit 25 years ago   • Alcohol use No      Comment: quit 2009   • Drug use: No   • Sexual activity: Defer     Other Topics Concern   • None     Social History Narrative       Family History   Problem Relation Age of Onset   • Heart disease Father    • Heart disease Maternal Grandmother        Objective    /90  Temp 97.8 °F (36.6 °C)  Ht 72\" (182.9 cm)  Wt 253 lb (115 kg)  BMI 34.31 kg/m2    Physical Exam    Office Visit on 05/04/2017   Component Date Value Ref Range Status   • Color 05/04/2017 Yellow  Yellow, Straw, Dark Yellow, Rosalia Final   • Clarity, UA 05/04/2017 Clear  Clear Final   • Glucose, UA 05/04/2017 Negative  Negative, 1000 mg/dL (3+) mg/dL Final   • Bilirubin 05/04/2017 Negative  Negative Final   • Ketones, UA 05/04/2017 Negative  Negative Final   • Specific Gravity  05/04/2017 1.025  1.005 - 1.030 Final   • Blood, UA 05/04/2017 Negative  Negative Final   • pH, Urine 05/04/2017 6.5  5.0 - 8.0 Final   • Protein, POC 05/04/2017 Negative  Negative mg/dL Final   • Urobilinogen, " UA 05/04/2017 Normal  Normal Final   • Leukocytes 05/04/2017 Negative  Negative Final   • Nitrite, UA 05/04/2017 Negative  Negative Final   • PSA 05/04/2017 0.563  0.000 - 4.000 ng/mL Final       Results for orders placed or performed in visit on 07/05/17   POC Urinalysis Dipstick, Automated   Result Value Ref Range    Color Yellow Yellow, Straw, Dark Yellow, Rosalia    Clarity, UA Clear Clear    Glucose,  mg/dL (A) Negative, 1000 mg/dL (3+) mg/dL    Bilirubin Negative Negative    Ketones, UA Negative Negative    Specific Gravity  1.025 1.005 - 1.030    Blood, UA Negative Negative    pH, Urine 6.5 5.0 - 8.0    Protein, POC Negative Negative mg/dL    Urobilinogen, UA Normal Normal    Leukocytes Negative Negative    Nitrite, UA Negative Negative     Assessment and Plan    Carlos A was seen today for benign prostatic hypertrophy.    Diagnoses and all orders for this visit:    Benign non-nodular prostatic hyperplasia with lower urinary tract symptoms  -     POC Urinalysis Dipstick, Automated   Symptoms did not improve significantly with the use of finasteride.  I discussed with him at this point he is on Flomax and finasteride which is maximal medical therapy.  He is emptying his bladder well and moving forward any further intervention would be for quality of life.  He is unhappy with how he is urinating and is interested in surgical intervention.  Plan for cystoscopy.        Estimation of residual urine via abdominal ultrasound  Residual Urine: 13 ml  Indication: bph  Position: Supine  Examination: Incremental scanning of the suprapubic area using 3 MHz transducer using copious amounts of acoustic gel.   Findings: An anechoic area was demonstrated which represented the bladder, with measurement of residual urine as noted. I inspected this myself. In that the residual urine was stable or insignificant, no treatment will be necessary at this time.

## 2017-07-13 ENCOUNTER — APPOINTMENT (OUTPATIENT)
Dept: GENERAL RADIOLOGY | Facility: HOSPITAL | Age: 57
End: 2017-07-13

## 2017-07-13 ENCOUNTER — HOSPITAL ENCOUNTER (OUTPATIENT)
Facility: HOSPITAL | Age: 57
Discharge: HOME | End: 2017-07-14
Attending: EMERGENCY MEDICINE | Admitting: INTERNAL MEDICINE

## 2017-07-13 DIAGNOSIS — R07.9 CHEST PAIN, UNSPECIFIED TYPE: Primary | ICD-10-CM

## 2017-07-13 DIAGNOSIS — R07.2 PRECORDIAL PAIN: ICD-10-CM

## 2017-07-13 LAB
ALBUMIN SERPL-MCNC: 4.3 G/DL (ref 3.5–5)
ALBUMIN/GLOB SERPL: 1.5 G/DL (ref 1.1–2.5)
ALP SERPL-CCNC: 95 U/L (ref 24–120)
ALT SERPL W P-5'-P-CCNC: 56 U/L (ref 0–54)
AMYLASE SERPL-CCNC: 68 U/L (ref 30–110)
ANION GAP SERPL CALCULATED.3IONS-SCNC: 12 MMOL/L (ref 4–13)
APTT PPP: 27.6 SECONDS (ref 24.1–34.8)
AST SERPL-CCNC: 35 U/L (ref 7–45)
BASOPHILS # BLD AUTO: 0.02 10*3/MM3 (ref 0–0.2)
BASOPHILS NFR BLD AUTO: 0.5 % (ref 0–2)
BILIRUB SERPL-MCNC: 0.7 MG/DL (ref 0.1–1)
BILIRUB UR QL STRIP: NEGATIVE
BUN BLD-MCNC: 23 MG/DL (ref 5–21)
BUN/CREAT SERPL: 17.4 (ref 7–25)
CALCIUM SPEC-SCNC: 9.5 MG/DL (ref 8.4–10.4)
CHLORIDE SERPL-SCNC: 103 MMOL/L (ref 98–110)
CLARITY UR: CLEAR
CO2 SERPL-SCNC: 20 MMOL/L (ref 24–31)
COLOR UR: YELLOW
CREAT BLD-MCNC: 1.32 MG/DL (ref 0.5–1.4)
D DIMER PPP FEU-MCNC: 0.34 MG/L (FEU) (ref 0–0.5)
DEPRECATED RDW RBC AUTO: 41 FL (ref 40–54)
EOSINOPHIL # BLD AUTO: 0.15 10*3/MM3 (ref 0–0.7)
EOSINOPHIL NFR BLD AUTO: 3.5 % (ref 0–4)
ERYTHROCYTE [DISTWIDTH] IN BLOOD BY AUTOMATED COUNT: 12.8 % (ref 12–15)
GFR SERPL CREATININE-BSD FRML MDRD: 56 ML/MIN/1.73
GLOBULIN UR ELPH-MCNC: 2.9 GM/DL
GLUCOSE BLD-MCNC: 346 MG/DL (ref 70–100)
GLUCOSE UR STRIP-MCNC: ABNORMAL MG/DL
HCT VFR BLD AUTO: 37.3 % (ref 40–52)
HGB BLD-MCNC: 12.5 G/DL (ref 14–18)
HGB UR QL STRIP.AUTO: NEGATIVE
HOLD SPECIMEN: NORMAL
HOLD SPECIMEN: NORMAL
IMM GRANULOCYTES # BLD: 0 10*3/MM3 (ref 0–0.03)
IMM GRANULOCYTES NFR BLD: 0 % (ref 0–5)
INR PPP: 0.86 (ref 0.91–1.09)
KETONES UR QL STRIP: ABNORMAL
LEUKOCYTE ESTERASE UR QL STRIP.AUTO: NEGATIVE
LIPASE SERPL-CCNC: 46 U/L (ref 23–203)
LYMPHOCYTES # BLD AUTO: 1.79 10*3/MM3 (ref 0.72–4.86)
LYMPHOCYTES NFR BLD AUTO: 41.3 % (ref 15–45)
MCH RBC QN AUTO: 29.4 PG (ref 28–32)
MCHC RBC AUTO-ENTMCNC: 33.5 G/DL (ref 33–36)
MCV RBC AUTO: 87.8 FL (ref 82–95)
MONOCYTES # BLD AUTO: 0.34 10*3/MM3 (ref 0.19–1.3)
MONOCYTES NFR BLD AUTO: 7.9 % (ref 4–12)
NEUTROPHILS # BLD AUTO: 2.03 10*3/MM3 (ref 1.87–8.4)
NEUTROPHILS NFR BLD AUTO: 46.8 % (ref 39–78)
NITRITE UR QL STRIP: NEGATIVE
NT-PROBNP SERPL-MCNC: 57.3 PG/ML (ref 0–900)
PH UR STRIP.AUTO: 5.5 [PH] (ref 5–8)
PLATELET # BLD AUTO: 137 10*3/MM3 (ref 130–400)
PMV BLD AUTO: 12 FL (ref 6–12)
POTASSIUM BLD-SCNC: 4.6 MMOL/L (ref 3.5–5.3)
PROT SERPL-MCNC: 7.2 G/DL (ref 6.3–8.7)
PROT UR QL STRIP: NEGATIVE
PROTHROMBIN TIME: 12 SECONDS (ref 11.9–14.6)
RBC # BLD AUTO: 4.25 10*6/MM3 (ref 4.8–5.9)
SODIUM BLD-SCNC: 135 MMOL/L (ref 135–145)
SP GR UR STRIP: 1.03 (ref 1–1.03)
TROPONIN I SERPL-MCNC: <0.012 NG/ML (ref 0–0.03)
TROPONIN I SERPL-MCNC: <0.012 NG/ML (ref 0–0.03)
UROBILINOGEN UR QL STRIP: ABNORMAL
WBC NRBC COR # BLD: 4.33 10*3/MM3 (ref 4.8–10.8)
WHOLE BLOOD HOLD SPECIMEN: NORMAL
WHOLE BLOOD HOLD SPECIMEN: NORMAL

## 2017-07-13 PROCEDURE — 82150 ASSAY OF AMYLASE: CPT | Performed by: EMERGENCY MEDICINE

## 2017-07-13 PROCEDURE — 93010 ELECTROCARDIOGRAM REPORT: CPT | Performed by: INTERNAL MEDICINE

## 2017-07-13 PROCEDURE — 83690 ASSAY OF LIPASE: CPT | Performed by: EMERGENCY MEDICINE

## 2017-07-13 PROCEDURE — 96375 TX/PRO/DX INJ NEW DRUG ADDON: CPT

## 2017-07-13 PROCEDURE — G0378 HOSPITAL OBSERVATION PER HR: HCPCS

## 2017-07-13 PROCEDURE — 84484 ASSAY OF TROPONIN QUANT: CPT | Performed by: EMERGENCY MEDICINE

## 2017-07-13 PROCEDURE — 80053 COMPREHEN METABOLIC PANEL: CPT | Performed by: EMERGENCY MEDICINE

## 2017-07-13 PROCEDURE — 85379 FIBRIN DEGRADATION QUANT: CPT | Performed by: EMERGENCY MEDICINE

## 2017-07-13 PROCEDURE — 25010000002 ONDANSETRON PER 1 MG: Performed by: EMERGENCY MEDICINE

## 2017-07-13 PROCEDURE — 81003 URINALYSIS AUTO W/O SCOPE: CPT | Performed by: EMERGENCY MEDICINE

## 2017-07-13 PROCEDURE — 83880 ASSAY OF NATRIURETIC PEPTIDE: CPT | Performed by: EMERGENCY MEDICINE

## 2017-07-13 PROCEDURE — 93005 ELECTROCARDIOGRAM TRACING: CPT | Performed by: EMERGENCY MEDICINE

## 2017-07-13 PROCEDURE — 99285 EMERGENCY DEPT VISIT HI MDM: CPT

## 2017-07-13 PROCEDURE — 25010000002 MORPHINE SULFATE (PF) 2 MG/ML SOLUTION: Performed by: INTERNAL MEDICINE

## 2017-07-13 PROCEDURE — 96374 THER/PROPH/DIAG INJ IV PUSH: CPT

## 2017-07-13 PROCEDURE — 85730 THROMBOPLASTIN TIME PARTIAL: CPT | Performed by: EMERGENCY MEDICINE

## 2017-07-13 PROCEDURE — 85025 COMPLETE CBC W/AUTO DIFF WBC: CPT | Performed by: EMERGENCY MEDICINE

## 2017-07-13 PROCEDURE — 85610 PROTHROMBIN TIME: CPT | Performed by: EMERGENCY MEDICINE

## 2017-07-13 PROCEDURE — 71010 HC CHEST PA OR AP: CPT

## 2017-07-13 PROCEDURE — 25010000002 HYDROMORPHONE PER 4 MG: Performed by: EMERGENCY MEDICINE

## 2017-07-13 RX ORDER — ONDANSETRON 2 MG/ML
4 INJECTION INTRAMUSCULAR; INTRAVENOUS EVERY 6 HOURS PRN
Status: DISCONTINUED | OUTPATIENT
Start: 2017-07-13 | End: 2017-07-14 | Stop reason: HOSPADM

## 2017-07-13 RX ORDER — TRIAMTERENE AND HYDROCHLOROTHIAZIDE 75; 50 MG/1; MG/1
1 TABLET ORAL DAILY
Status: DISCONTINUED | OUTPATIENT
Start: 2017-07-14 | End: 2017-07-14 | Stop reason: HOSPADM

## 2017-07-13 RX ORDER — ALUMINA, MAGNESIA, AND SIMETHICONE 2400; 2400; 240 MG/30ML; MG/30ML; MG/30ML
30 SUSPENSION ORAL EVERY 6 HOURS PRN
Status: DISCONTINUED | OUTPATIENT
Start: 2017-07-13 | End: 2017-07-14 | Stop reason: HOSPADM

## 2017-07-13 RX ORDER — RANOLAZINE 500 MG/1
500 TABLET, EXTENDED RELEASE ORAL EVERY 12 HOURS SCHEDULED
Status: DISCONTINUED | OUTPATIENT
Start: 2017-07-13 | End: 2017-07-14 | Stop reason: HOSPADM

## 2017-07-13 RX ORDER — MORPHINE SULFATE 2 MG/ML
2 INJECTION, SOLUTION INTRAMUSCULAR; INTRAVENOUS
Status: DISCONTINUED | OUTPATIENT
Start: 2017-07-13 | End: 2017-07-14 | Stop reason: HOSPADM

## 2017-07-13 RX ORDER — TAMSULOSIN HYDROCHLORIDE 0.4 MG/1
0.4 CAPSULE ORAL NIGHTLY
Status: DISCONTINUED | OUTPATIENT
Start: 2017-07-14 | End: 2017-07-14 | Stop reason: HOSPADM

## 2017-07-13 RX ORDER — ONDANSETRON 2 MG/ML
4 INJECTION INTRAMUSCULAR; INTRAVENOUS ONCE
Status: COMPLETED | OUTPATIENT
Start: 2017-07-13 | End: 2017-07-13

## 2017-07-13 RX ORDER — ROSUVASTATIN CALCIUM 20 MG/1
20 TABLET, COATED ORAL DAILY
Status: DISCONTINUED | OUTPATIENT
Start: 2017-07-14 | End: 2017-07-14 | Stop reason: HOSPADM

## 2017-07-13 RX ORDER — ASPIRIN 81 MG/1
81 TABLET, CHEWABLE ORAL DAILY
Status: DISCONTINUED | OUTPATIENT
Start: 2017-07-14 | End: 2017-07-14 | Stop reason: HOSPADM

## 2017-07-13 RX ORDER — HYDROCODONE BITARTRATE AND ACETAMINOPHEN 5; 325 MG/1; MG/1
1 TABLET ORAL EVERY 6 HOURS PRN
Status: DISCONTINUED | OUTPATIENT
Start: 2017-07-13 | End: 2017-07-14 | Stop reason: HOSPADM

## 2017-07-13 RX ORDER — SODIUM CHLORIDE 0.9 % (FLUSH) 0.9 %
1-10 SYRINGE (ML) INJECTION AS NEEDED
Status: DISCONTINUED | OUTPATIENT
Start: 2017-07-13 | End: 2017-07-14 | Stop reason: HOSPADM

## 2017-07-13 RX ORDER — ACETAMINOPHEN 325 MG/1
650 TABLET ORAL EVERY 6 HOURS PRN
Status: DISCONTINUED | OUTPATIENT
Start: 2017-07-13 | End: 2017-07-14 | Stop reason: HOSPADM

## 2017-07-13 RX ORDER — ISOSORBIDE DINITRATE 20 MG/1
20 TABLET ORAL EVERY 12 HOURS SCHEDULED
Status: DISCONTINUED | OUTPATIENT
Start: 2017-07-13 | End: 2017-07-14 | Stop reason: HOSPADM

## 2017-07-13 RX ORDER — IPRATROPIUM BROMIDE AND ALBUTEROL SULFATE 2.5; .5 MG/3ML; MG/3ML
3 SOLUTION RESPIRATORY (INHALATION) EVERY 4 HOURS PRN
Status: DISCONTINUED | OUTPATIENT
Start: 2017-07-13 | End: 2017-07-14 | Stop reason: HOSPADM

## 2017-07-13 RX ORDER — LISINOPRIL 20 MG/1
40 TABLET ORAL
Status: DISCONTINUED | OUTPATIENT
Start: 2017-07-14 | End: 2017-07-14

## 2017-07-13 RX ORDER — METOPROLOL TARTRATE 50 MG/1
50 TABLET, FILM COATED ORAL EVERY 12 HOURS
Status: DISCONTINUED | OUTPATIENT
Start: 2017-07-14 | End: 2017-07-14 | Stop reason: HOSPADM

## 2017-07-13 RX ORDER — ALPRAZOLAM 0.25 MG/1
0.25 TABLET ORAL EVERY 8 HOURS PRN
Status: DISCONTINUED | OUTPATIENT
Start: 2017-07-13 | End: 2017-07-14 | Stop reason: HOSPADM

## 2017-07-13 RX ORDER — PANTOPRAZOLE SODIUM 40 MG/1
40 TABLET, DELAYED RELEASE ORAL 2 TIMES DAILY
Status: DISCONTINUED | OUTPATIENT
Start: 2017-07-14 | End: 2017-07-14 | Stop reason: HOSPADM

## 2017-07-13 RX ORDER — NITROGLYCERIN 0.4 MG/1
0.4 TABLET SUBLINGUAL
Status: DISCONTINUED | OUTPATIENT
Start: 2017-07-13 | End: 2017-07-14

## 2017-07-13 RX ADMIN — ALPRAZOLAM 0.25 MG: 0.5 TABLET ORAL at 23:57

## 2017-07-13 RX ADMIN — HYDROMORPHONE HYDROCHLORIDE 1 MG: 1 INJECTION, SOLUTION INTRAMUSCULAR; INTRAVENOUS; SUBCUTANEOUS at 20:42

## 2017-07-13 RX ADMIN — METOPROLOL TARTRATE 50 MG: 50 TABLET ORAL at 23:57

## 2017-07-13 RX ADMIN — MORPHINE SULFATE 2 MG: 2 INJECTION, SOLUTION INTRAMUSCULAR; INTRAVENOUS at 23:57

## 2017-07-13 RX ADMIN — ONDANSETRON 4 MG: 2 INJECTION, SOLUTION INTRAMUSCULAR; INTRAVENOUS at 20:42

## 2017-07-14 VITALS
WEIGHT: 250.2 LBS | SYSTOLIC BLOOD PRESSURE: 136 MMHG | RESPIRATION RATE: 16 BRPM | TEMPERATURE: 97.5 F | HEART RATE: 58 BPM | OXYGEN SATURATION: 96 % | DIASTOLIC BLOOD PRESSURE: 84 MMHG | BODY MASS INDEX: 33.89 KG/M2 | HEIGHT: 72 IN

## 2017-07-14 LAB
ANION GAP SERPL CALCULATED.3IONS-SCNC: 10 MMOL/L (ref 4–13)
ARTICHOKE IGE QN: 45 MG/DL (ref 0–99)
BUN BLD-MCNC: 22 MG/DL (ref 5–21)
BUN/CREAT SERPL: 20.4 (ref 7–25)
CALCIUM SPEC-SCNC: 9.8 MG/DL (ref 8.4–10.4)
CHLORIDE SERPL-SCNC: 103 MMOL/L (ref 98–110)
CHOLEST SERPL-MCNC: 144 MG/DL (ref 130–200)
CO2 SERPL-SCNC: 24 MMOL/L (ref 24–31)
CREAT BLD-MCNC: 1.08 MG/DL (ref 0.5–1.4)
DEPRECATED RDW RBC AUTO: 41 FL (ref 40–54)
ERYTHROCYTE [DISTWIDTH] IN BLOOD BY AUTOMATED COUNT: 12.8 % (ref 12–15)
GFR SERPL CREATININE-BSD FRML MDRD: 70 ML/MIN/1.73
GLUCOSE BLD-MCNC: 318 MG/DL (ref 70–100)
GLUCOSE BLDC GLUCOMTR-MCNC: 207 MG/DL (ref 70–130)
GLUCOSE BLDC GLUCOMTR-MCNC: 238 MG/DL (ref 70–130)
GLUCOSE BLDC GLUCOMTR-MCNC: 342 MG/DL (ref 70–130)
HBA1C MFR BLD: 9.4 %
HCT VFR BLD AUTO: 37.5 % (ref 40–52)
HDLC SERPL-MCNC: 26 MG/DL
HGB BLD-MCNC: 12.6 G/DL (ref 14–18)
LDLC/HDLC SERPL: 1.56 {RATIO}
MAGNESIUM SERPL-MCNC: 1.8 MG/DL (ref 1.4–2.2)
MCH RBC QN AUTO: 29.4 PG (ref 28–32)
MCHC RBC AUTO-ENTMCNC: 33.6 G/DL (ref 33–36)
MCV RBC AUTO: 87.6 FL (ref 82–95)
PLATELET # BLD AUTO: 114 10*3/MM3 (ref 130–400)
PMV BLD AUTO: 11.2 FL (ref 6–12)
POTASSIUM BLD-SCNC: 5.4 MMOL/L (ref 3.5–5.3)
RBC # BLD AUTO: 4.28 10*6/MM3 (ref 4.8–5.9)
SODIUM BLD-SCNC: 137 MMOL/L (ref 135–145)
TRIGL SERPL-MCNC: 387 MG/DL (ref 0–149)
TROPONIN I SERPL-MCNC: <0.012 NG/ML (ref 0–0.03)
TSH SERPL DL<=0.05 MIU/L-ACNC: 1.82 MIU/ML (ref 0.47–4.68)
WBC NRBC COR # BLD: 4.57 10*3/MM3 (ref 4.8–10.8)

## 2017-07-14 PROCEDURE — 93458 L HRT ARTERY/VENTRICLE ANGIO: CPT | Performed by: INTERNAL MEDICINE

## 2017-07-14 PROCEDURE — G0378 HOSPITAL OBSERVATION PER HR: HCPCS

## 2017-07-14 PROCEDURE — 84443 ASSAY THYROID STIM HORMONE: CPT | Performed by: INTERNAL MEDICINE

## 2017-07-14 PROCEDURE — 84484 ASSAY OF TROPONIN QUANT: CPT | Performed by: INTERNAL MEDICINE

## 2017-07-14 PROCEDURE — 36415 COLL VENOUS BLD VENIPUNCTURE: CPT | Performed by: INTERNAL MEDICINE

## 2017-07-14 PROCEDURE — C1760 CLOSURE DEV, VASC: HCPCS | Performed by: INTERNAL MEDICINE

## 2017-07-14 PROCEDURE — 83036 HEMOGLOBIN GLYCOSYLATED A1C: CPT | Performed by: INTERNAL MEDICINE

## 2017-07-14 PROCEDURE — 82962 GLUCOSE BLOOD TEST: CPT

## 2017-07-14 PROCEDURE — 99152 MOD SED SAME PHYS/QHP 5/>YRS: CPT | Performed by: INTERNAL MEDICINE

## 2017-07-14 PROCEDURE — 25010000002 FENTANYL CITRATE (PF) 100 MCG/2ML SOLUTION: Performed by: INTERNAL MEDICINE

## 2017-07-14 PROCEDURE — 94799 UNLISTED PULMONARY SVC/PX: CPT

## 2017-07-14 PROCEDURE — C1769 GUIDE WIRE: HCPCS | Performed by: INTERNAL MEDICINE

## 2017-07-14 PROCEDURE — 80061 LIPID PANEL: CPT | Performed by: NURSE PRACTITIONER

## 2017-07-14 PROCEDURE — 25010000002 DIPHENHYDRAMINE PER 50 MG: Performed by: INTERNAL MEDICINE

## 2017-07-14 PROCEDURE — 0 IOPAMIDOL PER 1 ML: Performed by: INTERNAL MEDICINE

## 2017-07-14 PROCEDURE — 25010000002 MIDAZOLAM PER 1 MG: Performed by: INTERNAL MEDICINE

## 2017-07-14 PROCEDURE — 83735 ASSAY OF MAGNESIUM: CPT | Performed by: INTERNAL MEDICINE

## 2017-07-14 PROCEDURE — 80048 BASIC METABOLIC PNL TOTAL CA: CPT | Performed by: INTERNAL MEDICINE

## 2017-07-14 PROCEDURE — 99214 OFFICE O/P EST MOD 30 MIN: CPT | Performed by: INTERNAL MEDICINE

## 2017-07-14 PROCEDURE — C1894 INTRO/SHEATH, NON-LASER: HCPCS | Performed by: INTERNAL MEDICINE

## 2017-07-14 PROCEDURE — 85027 COMPLETE CBC AUTOMATED: CPT | Performed by: INTERNAL MEDICINE

## 2017-07-14 PROCEDURE — 63710000001 INSULIN DETEMIR PER 5 UNITS: Performed by: INTERNAL MEDICINE

## 2017-07-14 RX ORDER — LISINOPRIL 20 MG/1
20 TABLET ORAL
Status: DISCONTINUED | OUTPATIENT
Start: 2017-07-14 | End: 2017-07-14 | Stop reason: HOSPADM

## 2017-07-14 RX ORDER — LIDOCAINE HYDROCHLORIDE 20 MG/ML
INJECTION, SOLUTION INFILTRATION; PERINEURAL AS NEEDED
Status: DISCONTINUED | OUTPATIENT
Start: 2017-07-14 | End: 2017-07-14 | Stop reason: HOSPADM

## 2017-07-14 RX ORDER — DEXTROSE MONOHYDRATE 25 G/50ML
25 INJECTION, SOLUTION INTRAVENOUS
Status: DISCONTINUED | OUTPATIENT
Start: 2017-07-14 | End: 2017-07-14 | Stop reason: SDUPTHER

## 2017-07-14 RX ORDER — LEVETIRACETAM 500 MG/1
2000 TABLET ORAL NIGHTLY
Status: DISCONTINUED | OUTPATIENT
Start: 2017-07-14 | End: 2017-07-14 | Stop reason: HOSPADM

## 2017-07-14 RX ORDER — SODIUM CHLORIDE 9 MG/ML
100 INJECTION, SOLUTION INTRAVENOUS CONTINUOUS
Status: DISCONTINUED | OUTPATIENT
Start: 2017-07-14 | End: 2017-07-14 | Stop reason: HOSPADM

## 2017-07-14 RX ORDER — MIDAZOLAM HYDROCHLORIDE 1 MG/ML
INJECTION INTRAMUSCULAR; INTRAVENOUS AS NEEDED
Status: DISCONTINUED | OUTPATIENT
Start: 2017-07-14 | End: 2017-07-14 | Stop reason: HOSPADM

## 2017-07-14 RX ORDER — ACETAMINOPHEN 325 MG/1
650 TABLET ORAL EVERY 4 HOURS PRN
Status: CANCELLED | OUTPATIENT
Start: 2017-07-14

## 2017-07-14 RX ORDER — NICOTINE POLACRILEX 4 MG
15 LOZENGE BUCCAL
Status: DISCONTINUED | OUTPATIENT
Start: 2017-07-14 | End: 2017-07-14 | Stop reason: HOSPADM

## 2017-07-14 RX ORDER — NITROGLYCERIN 0.4 MG/1
0.4 TABLET SUBLINGUAL
Status: CANCELLED | OUTPATIENT
Start: 2017-07-14

## 2017-07-14 RX ORDER — LAMOTRIGINE 100 MG/1
200 TABLET ORAL EVERY 12 HOURS SCHEDULED
Status: DISCONTINUED | OUTPATIENT
Start: 2017-07-14 | End: 2017-07-14 | Stop reason: HOSPADM

## 2017-07-14 RX ORDER — LEVETIRACETAM 500 MG/1
1500 TABLET ORAL DAILY
Status: DISCONTINUED | OUTPATIENT
Start: 2017-07-14 | End: 2017-07-14 | Stop reason: HOSPADM

## 2017-07-14 RX ORDER — FENTANYL CITRATE 50 UG/ML
INJECTION, SOLUTION INTRAMUSCULAR; INTRAVENOUS AS NEEDED
Status: DISCONTINUED | OUTPATIENT
Start: 2017-07-14 | End: 2017-07-14 | Stop reason: HOSPADM

## 2017-07-14 RX ORDER — NICOTINE POLACRILEX 4 MG
15 LOZENGE BUCCAL
Status: DISCONTINUED | OUTPATIENT
Start: 2017-07-14 | End: 2017-07-14 | Stop reason: SDUPTHER

## 2017-07-14 RX ORDER — ONDANSETRON 2 MG/ML
4 INJECTION INTRAMUSCULAR; INTRAVENOUS EVERY 6 HOURS PRN
Status: CANCELLED | OUTPATIENT
Start: 2017-07-14

## 2017-07-14 RX ORDER — SODIUM CHLORIDE 0.9 % (FLUSH) 0.9 %
1-10 SYRINGE (ML) INJECTION AS NEEDED
Status: CANCELLED | OUTPATIENT
Start: 2017-07-14

## 2017-07-14 RX ORDER — DIPHENHYDRAMINE HYDROCHLORIDE 50 MG/ML
INJECTION INTRAMUSCULAR; INTRAVENOUS AS NEEDED
Status: DISCONTINUED | OUTPATIENT
Start: 2017-07-14 | End: 2017-07-14 | Stop reason: HOSPADM

## 2017-07-14 RX ORDER — DEXTROSE MONOHYDRATE 25 G/50ML
25 INJECTION, SOLUTION INTRAVENOUS
Status: DISCONTINUED | OUTPATIENT
Start: 2017-07-14 | End: 2017-07-14 | Stop reason: HOSPADM

## 2017-07-14 RX ORDER — LEVETIRACETAM 500 MG/1
500 TABLET ORAL EVERY 12 HOURS SCHEDULED
Status: DISCONTINUED | OUTPATIENT
Start: 2017-07-14 | End: 2017-07-14

## 2017-07-14 RX ADMIN — PANTOPRAZOLE SODIUM 40 MG: 40 TABLET, DELAYED RELEASE ORAL at 06:02

## 2017-07-14 RX ADMIN — RANOLAZINE 500 MG: 500 TABLET, FILM COATED, EXTENDED RELEASE ORAL at 00:59

## 2017-07-14 RX ADMIN — ISOSORBIDE DINITRATE 20 MG: 20 TABLET ORAL at 00:58

## 2017-07-14 RX ADMIN — INSULIN DETEMIR 25 UNITS: 100 INJECTION, SOLUTION SUBCUTANEOUS at 00:59

## 2017-07-14 RX ADMIN — TAMSULOSIN HYDROCHLORIDE 0.4 MG: 0.4 CAPSULE ORAL at 00:59

## 2017-07-14 NOTE — ED PROVIDER NOTES
Subjective chest pain  History of Present Illness  Charlene jara is a 57-year-old white man who comes in today with chief complaint of chest pain.  The patient does have a history of coronary artery disease.  In fact he had a stress test with myocardial perfusion done in February 2017.  Tests revealed an EF of 63% there was however some lateral wall ischemia noted.  The study was read as a low risk.  He started with chest pain yesterday.  This gotten progressively worse over the last several hours.  He did attempt his own nitroglycerin without any overall improvement.  He states that he initially thought it was indigestion however the discomfort has only grown worse.  He does have a history of multiple coronary stents.  He states he has 5 or 6.  He has multiple risk factors include hypertension up edema hypertension he has a history of bone diabetes as well as ongoing coronary artery disease.  Upon my arrival the bedside the patient was continuing to have some discomfort.   He Does admit that his had nausea as well as diaphoresis.  Review of Systems   Constitutional: Positive for diaphoresis.   HENT: Negative.    Eyes: Negative.    Respiratory: Positive for shortness of breath.    Cardiovascular: Positive for chest pain.   Gastrointestinal: Positive for nausea and vomiting.   Endocrine: Negative.    Genitourinary: Negative.    Musculoskeletal: Negative.    Skin: Negative.    Allergic/Immunologic: Negative.    Neurological: Negative.    Hematological: Negative.    Psychiatric/Behavioral: Negative.    All other systems reviewed and are negative.      Past Medical History:   Diagnosis Date   • Asthma    • Coronary artery disease    • Diabetes mellitus    • Diverticulitis    • GERD (gastroesophageal reflux disease)    • Hyperlipidemia    • Hypertension    • Kidney stone    • Murmur, heart    • Myocardial infarction    • Seizures    • Stroke        Allergies   Allergen Reactions   • Atorvastatin Anaphylaxis   • Flagyl  [Metronidazole] Anaphylaxis   • Ciprofloxacin Hives       Past Surgical History:   Procedure Laterality Date   • CARDIAC CATHETERIZATION  01/2016    Dr. Broadbent; widely patent previously placed stents in the left anterior descending and obstructive disease involving the diagonal branch which was treated medically   • CORONARY ANGIOPLASTY     • CORONARY STENT PLACEMENT      Pt states 5 or 6 stents   • HERNIA REPAIR      x2   • KIDNEY STONE SURGERY     • KNEE SURGERY Right        Family History   Problem Relation Age of Onset   • Heart disease Father    • Heart disease Maternal Grandmother        Social History     Social History   • Marital status:      Spouse name: N/A   • Number of children: N/A   • Years of education: N/A     Social History Main Topics   • Smoking status: Former Smoker     Quit date: 1993   • Smokeless tobacco: Former User     Types: Chew     Quit date: 2009      Comment: Pt quit 25 years ago   • Alcohol use No      Comment: quit 2009   • Drug use: No   • Sexual activity: Defer     Other Topics Concern   • None     Social History Narrative           Objective   Physical Exam   Constitutional: He is oriented to person, place, and time. He appears well-developed and well-nourished.   HENT:   Head: Normocephalic and atraumatic.   Right Ear: External ear normal.   Left Ear: External ear normal.   Nose: Nose normal.   Mouth/Throat: Oropharynx is clear and moist.   Eyes: Conjunctivae and EOM are normal. Pupils are equal, round, and reactive to light. Right eye exhibits no discharge. Left eye exhibits no discharge.   Neck: Normal range of motion. Neck supple. No thyromegaly present.   Cardiovascular: Normal rate, regular rhythm, normal heart sounds and intact distal pulses.  Exam reveals no friction rub.    No murmur heard.  Pulmonary/Chest: Effort normal and breath sounds normal. No respiratory distress.   Abdominal: Soft. Bowel sounds are normal. He exhibits no distension. There is no  tenderness.   Musculoskeletal: Normal range of motion. He exhibits no edema or deformity.   Neurological: He is alert and oriented to person, place, and time. He has normal reflexes. No cranial nerve deficit.   Skin: Skin is warm and dry. No rash noted.   Psychiatric: Judgment normal.   Nursing note and vitals reviewed.      Procedures         ED Course  ED Course   Comment By Time   The patient will be admitted to the services of the hospitalist. I recommended cardiology consult. Leann Alston MD 07/13 1925                  MDM  Number of Diagnoses or Management Options  Chest pain, unspecified type: established and worsening     Amount and/or Complexity of Data Reviewed  Clinical lab tests: ordered and reviewed  Tests in the radiology section of CPT®: ordered and reviewed  Discuss the patient with other providers: yes    Risk of Complications, Morbidity, and/or Mortality  Presenting problems: high  Diagnostic procedures: high  Management options: high    Patient Progress  Patient progress: stable      Final diagnoses:   Chest pain, unspecified type            Leann Alston MD  07/13/17 3026

## 2017-07-14 NOTE — DISCHARGE SUMMARY
[facility] Medicine Services  DISCHARGE SUMMARY       Date of Admission: 2017  Date of Discharge:  2017  Primary Care Physician: GINA Felder    Presenting Problem/History of Present Illness:  Chest discomfort.     Final Discharge Diagnoses:  1. Chest pain with history of coronary artery disease and previous stents, non-occlusive disease on [facility]   2. Hypertension  3. Hyperlipidemia  4. Diabetes mellitus, type II, uncontrolled, A1c 9.4  5. Seizure disorder  6. Cerebrovascular disease with history of CVA  7. GERD    Consults:   1. Dr. Ramey, cardiology    Procedures Performed:   1. Left heart catheterization per Dr. Ramey on 17    Carlos A Boyer    Cardiac Catheterization/Vascular Study   Order# [id number]    Reading physician: Wade Ramey MD   Ordering physician: TEENA Espino   Study date: 17         Patient Information      Patient Name MRN Sex  (Age)     Carlos A Boyer Jr. [id number] Male 1960 (57 y.o.)         Date of Birth Sex Race Ethnicity Encounter Category     Mar 22, 1960 Male White or  Not  or  Emergency       Physicians      Panel Physicians Referring Physician Case Authorizing Physician     Wade Ramey MD (Primary) MD Americo Aguilar APRN       Procedures      Left Heart Cath       Indications      Precordial pain [R07.2 (ICD-10-CM)]       Pre-procedure Diagnosis      chest pain           Conclusion      Cardiac Catheterization Operative Report     Carlos A Boyer Jr.  [id number]  2017     Patient was referred for cardiac catheterization . Indications for the procedure include: recurrent chest pain, shortness of breath. Despite low risk stress test getting recurrent chest pain reminiscent of prior angina.         Procedure performed  Left heart cath  Coronary angiography  Right femoral arteriography  Insertion of 6 Fr Mynx hemostatic closure device with effective hemostasis and  preserved right lower extremity pulses  Left ventriculography  Supervision of the administration of moderate sedation        Procedure Details  The risks, benefits, complications, treatment options, and expected outcomes were discussed with the patient. The patient and/or family concurred with the proposed plan, giving informed consent. Patient was brought to the cath lab after IV hydration was begun and oral premedication was given. He was further sedated with fentanyl and midazolam. He was prepped and draped in the usual manner. Using the modified Seldinger access technique, a 6f [place] sheath was placed in the femoral artery.  A left heart catheterization was done. Angiograms were also done.  Cardiac  Cardiac Catheterization Operative Report        Patient was referred for cardiac catheterization . Indications for the procedure include: abnormal stress test, chest pain, shortness of breath.      Procedure Details  The risks, benefits, complications, treatment options, and expected outcomes were discussed with the patient. The patient and/or family concurred with the proposed plan, giving informed consent. Patient was brought to the cath lab after IV hydration was begun and oral premedication was given.      The skin overlying the patient's right femoral artery was prepped and draped in the usual sterile fashion. Timeout was taken to confirm the correct patient and procedure. Lidocaine was administered for local anesthesia. IV Versed and fentanyl were used to achieve conscious sedation. Modified Seldinger technique was then used to place a 6 [place] sheath in the right femoral artery     Diagnostic coronary angiography was performed with 5 [place] JL4 and JR 4 coronary catheters. Coronary angiogram were performed in German and TOMPKINS projection to evaluate the coronary arterial systems. A left heart catheterization was done. After all coronary angiograms and LV gram and Left heart pressure measurements were obtained, a  femoral angiogram was performed and the arteriotomy was suitable for a closure device. A 6Fr Mynx closure device was used to achieve hemostasis. The patient tolerated the procedure well, and there were no immediate complications.     Procedural Details: After written and informed consent was obtained, the patient was brought to the cath lab in a fasting state. Results:     1. Selective coronary angiography:     Left main coronary artery: The left main coronary artery arises from the left coronary cusp and bifurcates into the LAD and left circumflex arteries.      Left main coronary artery is normal     Left anterior descending artery: The LAD arises normally from the left main coronary artery and courses in the anterior interventricular groove and terminates at the apex. No stenosis noted. Widely patent stents noted in the mid left anterior descent coronary artery.     Left circumflex: The left circumflex arises form the left man artery and supplies obtuse marginal branches. Left circumflex coronary artery has a patent stent in the distal portion.     Right coronary artery: The RCA arises normally from the right coronary cusp and is dominant for the posterior circulation. The RCA is dominant and normal. Nonocclusive the right coronary artery stenosis     2. Left heart cath: LVEDP 17 mm Hg with no gradient across aortic valve on pullback.     3. LV Gram in Normal LVEF 75% with no significant mitral regurgitation.     4. Interventions: None     Estimated Blood Loss: Minimal      Complications:  None; patient tolerated the procedure well.      Disposition: COU       Condition: stable            I supervised the administration of conscious sedation by nursing staff throughout the case. First dose was given at and the end of my face-to-face encounter was at 1503 hours , case finished at 1517 hrs. During the case, continuous pulse oximetry, heart rate, blood pressure, and patient status were monitored.       Conclusion  Nonocclusive epicardial coronary arteries with widely patent stents in the left anterior descent coronary artery and left circumflex coronary artery.  Hyperdynamic left ventricle with ejection fraction of 75%.  LVEDP 17 mm Hg     Plan  Workup for noncardiac causes of chest pain this can be done as outpatient. His d-dimer is within normal range  After a period of observation of stable can be discharged either later today.  Keep follow-up appointment with me  Ongoing risk factor modifications keep LDL below 70 mg/dL  Hydration   Observation        Pertinent Test Results:   Lab Results (last 24 hours)     Procedure Component Value Units Date/Time    Comprehensive Metabolic Panel [[id number]]  (Abnormal) Collected:  07/13/17 1832    Specimen:  Blood Updated:  07/13/17 1853     Glucose 346 (H) mg/dL      BUN 23 (H) mg/dL      Creatinine 1.32 mg/dL      Sodium 135 mmol/L      Potassium 4.6 mmol/L      Chloride 103 mmol/L      CO2 20.0 (L) mmol/L      Calcium 9.5 mg/dL      Total Protein 7.2 g/dL      Albumin 4.30 g/dL      ALT (SGPT) 56 (H) U/L      AST (SGOT) 35 U/L      Alkaline Phosphatase 95 U/L      Total Bilirubin 0.7 mg/dL      eGFR Non African Amer 56 (L) mL/min/1.73      Globulin 2.9 gm/dL      A/G Ratio 1.5 g/dL      BUN/Creatinine Ratio 17.4     Anion Gap 12.0 mmol/L     Amylase [[id number]]  (Normal) Collected:  07/13/17 1832    Specimen:  Blood Updated:  07/13/17 1853     Amylase 68 U/L     Lipase [[id number]]  (Normal) Collected:  07/13/17 1832    Specimen:  Blood Updated:  07/13/17 1853     Lipase 46 U/L     BNP [[id number]]  (Normal) Collected:  07/13/17 1832    Specimen:  Blood Updated:  07/13/17 1905     proBNP 57.3 pg/mL     Troponin [[id number]]  (Normal) Collected:  07/13/17 1832    Specimen:  Blood Updated:  07/13/17 1905     Troponin I <0.012 ng/mL     CBC & Differential [[id number]] Collected:  07/13/17 1832    Specimen:  Blood Updated:  07/13/17 1949    Narrative:       The following  orders were created for panel order CBC & Differential.  Procedure                               Abnormality         Status                     ---------                               -----------         ------                     CBC Auto Differential[[id number]]        Abnormal            Final result                 Please view results for these tests on the individual orders.    CBC Auto Differential [[id number]]  (Abnormal) Collected:  07/13/17 1832    Specimen:  Blood Updated:  07/13/17 1949     WBC 4.33 (L) 10*3/mm3      RBC 4.25 (L) 10*6/mm3      Hemoglobin 12.5 (L) g/dL      Hematocrit 37.3 (L) %      MCV 87.8 fL      MCH 29.4 pg      MCHC 33.5 g/dL      RDW 12.8 %      RDW-SD 41.0 fl      MPV 12.0 fL      Platelets 137 10*3/mm3      Neutrophil % 46.8 %      Lymphocyte % 41.3 %      Monocyte % 7.9 %      Eosinophil % 3.5 %      Basophil % 0.5 %      Immature Grans % 0.0 %      Neutrophils, Absolute 2.03 10*3/mm3      Lymphocytes, Absolute 1.79 10*3/mm3      Monocytes, Absolute 0.34 10*3/mm3      Eosinophils, Absolute 0.15 10*3/mm3      Basophils, Absolute 0.02 10*3/mm3      Immature Grans, Absolute 0.00 10*3/mm3     Protime-INR [[id number]]  (Abnormal) Collected:  07/13/17 1833    Specimen:  Blood Updated:  07/13/17 1957     Protime 12.0 Seconds      INR 0.86 (L)    aPTT [[id number]]  (Normal) Collected:  07/13/17 1833    Specimen:  Blood Updated:  07/13/17 1957     PTT 27.6 seconds     D-dimer, Quantitative [[id number]]  (Normal) Collected:  07/13/17 1833    Specimen:  Blood Updated:  07/13/17 1957     D-Dimer, Quantitative 0.34 mg/L (FEU)     Narrative:       Reference Range is 0-0.50 mg/L FEU. However, results <0.50 mg/L FEU tends to rule out DVT or PE. Results >0.50 mg/L FEU are not useful in predicting absence or presence of DVT or PE.    [place] Draw [[id number]] Collected:  07/13/17 1832    Specimen:  Blood Updated:  07/13/17 2001    Narrative:       The following orders were created for panel order  [place] Draw.  Procedure                               Abnormality         Status                     ---------                               -----------         ------                     Light Blue Top[[id number]]                                   Final result               Green Top (Gel)[[id number]]                                  Final result               Lavender Top[[id number]]                                     Final result               Red Top[[id number]]                                          Final result                 Please view results for these tests on the individual orders.    Light Blue Top [[id number]] Collected:  07/13/17 1833    Specimen:  Blood Updated:  07/13/17 2001     Extra Tube hold for add-on      Auto resulted       Green Top (Gel) [[id number]] Collected:  07/13/17 1832    Specimen:  Blood Updated:  07/13/17 2001     Extra Tube Hold for add-ons.      Auto resulted.       Lavender Top [[id number]] Collected:  07/13/17 1832    Specimen:  Blood Updated:  07/13/17 2001     Extra Tube hold for add-on      Auto resulted       Red Top [[id number]] Collected:  07/13/17 1832    Specimen:  Blood Updated:  07/13/17 2001     Extra Tube Hold for add-ons.      Auto resulted.       Urinalysis With / Culture If Indicated [[id number]]  (Abnormal) Collected:  07/13/17 2005    Specimen:  Urine from Urine, Clean Catch Updated:  07/13/17 2017     Color, UA Yellow     Appearance, UA Clear     pH, UA 5.5     Specific Gravity, UA 1.029     Glucose, UA >=1000 mg/dL (3+) (A)     Ketones, UA Trace (A)     Bilirubin, UA Negative     Blood, UA Negative     Protein, UA Negative     Leuk Esterase, UA Negative     Nitrite, UA Negative     Urobilinogen, UA 1.0 E.U./dL    Narrative:       Urine microscopic not indicated.    Troponin [[id number]]  (Normal) Collected:  07/13/17 2202    Specimen:  Blood Updated:  07/13/17 2237     Troponin I <0.012 ng/mL     POC Glucose Fingerstick [[id number]]  (Abnormal) Collected:  07/14/17  0047    Specimen:  Blood Updated:  07/14/17 0106     Glucose 342 (H) mg/dL       : [id number] PherRapidan SusanMeter ID: [id number]       Troponin [[id number]]  (Normal) Collected:  07/14/17 0013    Specimen:  Blood Updated:  07/14/17 0115     Troponin I <0.012 ng/mL     CBC (No Diff) [[id number]]  (Abnormal) Collected:  07/14/17 0413    Specimen:  Blood Updated:  07/14/17 0433     WBC 4.57 (L) 10*3/mm3      RBC 4.28 (L) 10*6/mm3      Hemoglobin 12.6 (L) g/dL      Hematocrit 37.5 (L) %      MCV 87.6 fL      MCH 29.4 pg      MCHC 33.6 g/dL      RDW 12.8 %      RDW-SD 41.0 fl      MPV 11.2 fL      Platelets 114 (L) 10*3/mm3     Magnesium [[id number]]  (Normal) Collected:  07/14/17 0413    Specimen:  Blood Updated:  07/14/17 0446     Magnesium 1.8 mg/dL     Basic Metabolic Panel [[id number]]  (Abnormal) Collected:  07/14/17 0413    Specimen:  Blood Updated:  07/14/17 0446     Glucose 318 (H) mg/dL      BUN 22 (H) mg/dL      Creatinine 1.08 mg/dL      Sodium 137 mmol/L      Potassium 5.4 (H) mmol/L      Chloride 103 mmol/L      CO2 24.0 mmol/L      Calcium 9.8 mg/dL      eGFR Non African Amer 70 mL/min/1.73      BUN/Creatinine Ratio 20.4     Anion Gap 10.0 mmol/L     Narrative:       GFR Normal >60  Chronic Kidney Disease <60  Kidney Failure <15    Troponin [[id number]]  (Normal) Collected:  07/14/17 0413    Specimen:  Blood Updated:  07/14/17 0456     Troponin I <0.012 ng/mL     TSH [[id number]]  (Normal) Collected:  07/14/17 0413    Specimen:  Blood Updated:  07/14/17 0516     TSH 1.820 mIU/mL     Lipid Panel [[id number]]  (Abnormal) Collected:  07/14/17 0413    Specimen:  Blood Updated:  07/14/17 0703     Total Cholesterol 144 mg/dL      Triglycerides 387 (H) mg/dL      HDL Cholesterol 26 (L) mg/dL      LDL Cholesterol  45 mg/dL      LDL/HDL Ratio 1.56    Hemoglobin A1c [[id number]] Collected:  07/14/17 0413    Specimen:  Blood Updated:  07/14/17 0734     Hemoglobin A1C 9.4 %     Narrative:       Less than 6.0            Non-Diabetic Range  6.0-7.0                 ADA Therapeutic Target  Greater than 7.0        Action Suggested    POC Glucose Fingerstick [[id number]]  (Abnormal) Collected:  07/14/17 0820    Specimen:  Blood Updated:  07/14/17 0851     Glucose 207 (H) mg/dL       : [id number] Lisa BrendaMeter ID: [id number]       POC Glucose Fingerstick [[id number]]  (Abnormal) Collected:  07/14/17 1120    Specimen:  Blood Updated:  07/14/17 1150     Glucose 238 (H) mg/dL       : [id number] Lisa BrendaMeter ID: [id number]       Troponin [[id number]]  (Normal) Collected:  07/14/17 1108    Specimen:  Blood Updated:  07/14/17 1158     Troponin I <0.012 ng/mL         Imaging Results (last 72 hours)     Procedure Component Value Units Date/Time    XR Chest 1 View [[id number]] Collected:  07/13/17 1955     Updated:  07/13/17 2000    Narrative:       EXAMINATION: XR CHEST 1 VW- 7/13/2017 7:55 PM CDT     HISTORY: Chest pain.     REPORT: Comparison is made with the study from 01/17/2017.     Lungs are mildly hypoaerated, there is mild central basilar vascular  crowding, heart size is normal. There is no focal pulmonary infiltrate  or consolidation. No pneumothorax or effusion is seen. Extensive  costochondral calcification is noted at the junction of the first ribs  with the sternum. No acute osseous findings.       Impression:       No acute cardiopulmonary abnormality.  This report was finalized on 07/13/2017 19:57 by Dr. Cyril Reyna MD.        Hospital Course:  The patient is a 57 y.o. male who presented to [facility] with chest pain.  He has a past medical history is significant for coronary artery disease status post previous stents.  On admission his blood glucose was in the 300s and he states that this is normal for him lately.  [facility]  Prior to coming to the hospital he had chest pain described as pressure that did not seem to radiate.  He did take a  "nitroglycerin with improvement.  He also had associated diaphoresis and nausea.  He denied any shortness of breath but did have some lightheadedness and dizziness.  Due to his multiple risk factors and history of heart disease cardiology was consulted.  Dr. Ramey saw the patient and took the patient for a heart catheterization today.  It was found to be normal with no intervention needed.  His d-dimer was also negative.  [facility] for further [facility] workup.  He also has uncontrolled diabetes with a hemoglobin A1c of 9.4 and needs to be better controlled.  [facility] about [facility].  He has been instructed again on appropriate diet.  He is stable for discharge today once his bedrest is up from his heart catheterization.     Condition on Discharge:  Stable     Physical Exam on Discharge:  /84  Pulse 58  Temp 97.5 °F (36.4 °C) (Temporal Artery )   Resp 16  Ht 72\" (182.9 cm)  Wt 250 lb 3.2 oz (113 kg)  SpO2 96%  BMI 33.93 kg/m2     Physical Exam   Constitutional: He is oriented to person, place, and time. He appears well-developed and well-nourished.   HENT:   Head: Normocephalic and atraumatic.   Eyes: Conjunctivae and EOM are normal. Pupils are equal, round, and reactive to light.   Neck: Neck supple. No JVD present. No thyromegaly present.   Cardiovascular: Normal rate, regular rhythm, normal heart sounds and intact distal pulses.  Exam reveals no gallop and no friction rub.    No murmur heard.  Pulmonary/Chest: Effort normal and breath sounds normal. No respiratory distress. He has no wheezes. He has no rales. He exhibits no tenderness.   Abdominal: Soft. Bowel sounds are normal. He exhibits no distension. There is no tenderness. There is no rebound and no guarding.   Musculoskeletal: Normal range of motion. He exhibits no edema, tenderness or deformity.   Lymphadenopathy:     He has no cervical adenopathy.   Neurological: He is alert and " oriented to person, place, and time. He displays normal reflexes. No cranial nerve deficit. He exhibits normal muscle tone.   Skin: Skin is warm and dry. No rash noted.   Psychiatric: He has a normal mood and affect. His behavior is normal. Judgment and thought content normal.   Vitals reviewed.    Discharge Disposition:  Home or Self Care    Discharge Medications:   Carlos A Boyer Jr.   Home Medication Instructions BEN:[id number]    Printed on:07/14/17 4337   Medication Information                      acarbose (PRECOSE) 50 MG tablet  Take 50 mg by mouth. Take 1/2 tab three times a day             albuterol (PROVENTIL HFA;VENTOLIN HFA) 108 (90 BASE) MCG/ACT inhaler  Inhale 2 puffs Every 6 (Six) Hours As Needed for wheezing.             aspirin 81 MG chewable tablet  Chew 1 tablet Daily.             carboxymethylcellulose (REFRESH PLUS) 0.5 % solution  1 drop 4 (Four) Times a Day As Needed for dry eyes.             etodolac (LODINE) 400 MG tablet  Take 400 mg by mouth 2 (Two) Times a Day As Needed (pain).             FLUTICASONE PROPIONATE, NASAL, NA  50 mcg into each nostril Daily. 2 SPRAYS IN EACH NOSTRIL             FOLIC ACID PO  Take  by mouth.             insulin aspart (novoLOG) 100 UNIT/ML injection  Inject 5 Units under the skin 3 (Three) Times a Day Before Meals.             insulin glargine (LANTUS) 100 UNIT/ML injection  Inject 55 Units under the skin Daily.             isosorbide dinitrate (ISORDIL) 20 MG tablet  Take 1 tablet by mouth 2 (Two) Times a Day.             lamoTRIgine (LAMICTAL) 200 MG tablet  Take 1 tablet by mouth 2 (Two) Times a Day.             levETIRAcetam (KEPPRA) 500 MG tablet  Take 3 tablets in the morning and 4 tablets at bedtime             lisinopril (PRINIVIL,ZESTRIL) 40 MG tablet  Take 40 mg by mouth. Take a 1/2 tab at bedtime by mouth             metFORMIN (GLUCOPHAGE) 1000 MG tablet  Take 1,000 mg by mouth 2 (Two) Times a Day With Meals.             metoprolol tartrate  (LOPRESSOR) 50 MG tablet  Take 1 tablet by mouth 2 (Two) Times a Day.             nitroglycerin (NITROSTAT) 0.4 MG SL tablet  Place 0.4 mg under the tongue Every 5 (Five) Minutes As Needed for chest pain. Take no more than 3 doses in 15 minutes.             pantoprazole (PROTONIX) 40 MG EC tablet  Take 40 mg by mouth 2 (Two) Times a Day.             psyllium (METAMUCIL) 58.6 % packet  Take  by mouth Daily. 1 TABLESPOON             ranolazine (RANEXA) 500 MG 12 hr tablet  Take 1 tablet by mouth 2 (Two) Times a Day.             rosuvastatin (CRESTOR) 40 MG tablet  Take 20 mg by mouth Daily.             tamsulosin (FLOMAX) 0.4 MG capsule 24 hr capsule  Take 1 capsule by mouth Every Night.             triamterene-hydrochlorothiazide (MAXZIDE) 75-50 MG per tablet  Take 1 tablet by mouth Daily.             urea (CARMOL) 40 % ointment  Apply  topically Daily.               Discharge Diet:   Diet Instructions     Diet: Consistent Carbohydrate, Cardiac; Thin Liquids, No Restrictions       Discharge Diet:   Consistent Carbohydrate  Cardiac      Fluid Consistency:  Thin Liquids, No Restrictions               Activity at Discharge:   Activity Instructions     Activity as Tolerated                   Discharge Care Plan/Instructions:   1. Watch blood glucoses closely and [facility]    Follow-up Appointments:   [facility]. [facility]   Future Appointments  Date Time Provider Department Center   8/17/2017 9:20 AM TEENA Castillo N PAD None   12/1/2017 9:30 AM PAD BIC MRI 2 BH PAD MR BI PAD   5/14/2018 9:45 AM Wade Ramey MD [facility] CD PAD None     Test Results Pending at Discharge: None    TEENA Atkinson  07/14/17  4:13 PM    Time: 25 minutes     I personally evaluated and examined the patient in conjunction with TEENA Arroyo and agree with the assessment, treatment plan, and disposition of the patient as recorded by her. My history, exam, and further recommendations are:     The patient was  evaluated by cardiology and ultimately underwent a left heart catheterization this afternoon with Dr. Ramey.  This showed nonocclusive coronary disease and patent stents.  D-dimer was normal in the emergency department.    Chest pain of noncardiac origin can be worked up as an outpatient.  He does have a history of gastroesophageal reflux disease and already takes Protonix twice per day.  He has had an endoscopy with Dr. Vargas in [place] within the last year.    He needs better control of his diabetes as his hemoglobin A1c is found to be 9.4.  His LDL cholesterol is currently 45 on Crestor.  His hypertriglyceridemia should improve with improvement of his glycemic control.  His blood pressure is also fairly well controlled at this point in time.  His TSH is normal. He does not smoke.     He does admit to a history of asthma which does not sound to be poorly controlled.  He rarely uses his albuterol.    He currently is sitting up in bed, eating dinner.  His wife is not currently present.  No current complaints.  Nothing outstanding on physical exam.  Discussed with his nurse, Yolanda.    [facility] [place]     Hamilton Mari DO  07/14/17  5:11 PM

## 2017-07-14 NOTE — CONSULTS
[facility] CONSULT NOTE    Referring Provider: Rick Raymond MD    Reason for Consultation: Chest Pain    Chief complaint:   Chief Complaint   Patient presents with   • Chest Pain       Subjective .     History of present illness:  Carlos A Boyer Jr. is a 57 y.o. year old male with history of coronary artery disease, with small vessel disease that is treated medically. Patient had heart cath April 2014, that showed previous stents patent with smell vessel disease, 8/14 same as 4/14, 12/14 stent to RCA, 2/15 no intervention, patent stents, 1/16 BMS placed to fifth obtuse marginal  And chronic obstructive diease to diagonal branch. Patient has had chest pain off and on for years. Patient had a negative stress echo in January and a negative Lexiscan in February. Patient has had negative enzymes with no EKG changes compared to old. Patient saw Dr. Ramey in May and had been doing well, antianginal medicines were increased. Patient states the chest pain has started to get worse again. He describes this as a tightness in his chest. He states it is worse with breathing. This has become increasingly worse and is now present constantly. Patient has had a murmur for years, last echo was stable. Patient has poorly controlled diabetes. Patient states his blood pressure has been fluctuating from extreme highs and extreme lows.     History  Past Medical History:   Diagnosis Date   • Asthma    • Coronary artery disease    • Diabetes mellitus    • Diverticulitis    • GERD (gastroesophageal reflux disease)    • Hyperlipidemia    • Hypertension    • Kidney stone    • Murmur, heart    • Myocardial infarction    • Seizures    • Stroke    ,   Past Surgical History:   Procedure Laterality Date   • CARDIAC CATHETERIZATION  01/2016    Dr. Broadbent; widely patent previously placed stents in the left anterior descending and obstructive disease involving the diagonal branch which was treated medically   • CORONARY  ANGIOPLASTY     • CORONARY STENT PLACEMENT      Pt states 5 or 6 stents   • HERNIA REPAIR      x2   • KIDNEY STONE SURGERY     • KNEE SURGERY Right    • THUMB AMPUTATION Left     partial   • TOE AMPUTATION Right     big   ,   Family History   Problem Relation Age of Onset   • Heart disease Father    • Heart disease Maternal Grandmother    ,   Social History   Substance Use Topics   • Smoking status: Former Smoker     Quit date: 1993   • Smokeless tobacco: Former User     Types: Chew     Quit date: 2009      Comment: Pt quit 25 years ago   • Alcohol use No      Comment: quit 2009   ,     Medications    Prior to Admission medications    Medication Sig Start Date End Date Taking? Authorizing Provider   aspirin 81 MG chewable tablet Chew 1 tablet Daily. 2/4/17  Yes TEENA Borges   insulin glargine (LANTUS) 100 UNIT/ML injection Inject 55 Units under the skin Daily.   Yes Historical Provider, MD   isosorbide dinitrate (ISORDIL) 20 MG tablet Take 1 tablet by mouth 2 (Two) Times a Day. 10/18/16  Yes TEENA Garcia   lamoTRIgine (LAMICTAL) 200 MG tablet Take 1 tablet by mouth 2 (Two) Times a Day. 4/21/17  Yes Hamilton White MD   levETIRAcetam (KEPPRA) 500 MG tablet Take 3 tablets in the morning and 4 tablets at bedtime 4/21/17  Yes Hamilton White MD   lisinopril (PRINIVIL,ZESTRIL) 40 MG tablet Take 40 mg by mouth. Take a 1/2 tab at bedtime by mouth   Yes Historical Provider, MD   metFORMIN (GLUCOPHAGE) 1000 MG tablet Take 1,000 mg by mouth 2 (Two) Times a Day With Meals.   Yes Historical Provider, MD   metoprolol tartrate (LOPRESSOR) 50 MG tablet Take 1 tablet by mouth 2 (Two) Times a Day. 10/18/16  Yes TEENA Gracia   pantoprazole (PROTONIX) 40 MG EC tablet Take 40 mg by mouth 2 (Two) Times a Day.   Yes Historical Provider, MD   ranolazine (RANEXA) 500 MG 12 hr tablet Take 1 tablet by mouth 2 (Two) Times a Day. 5/12/17  Yes Wade Ramey MD   rosuvastatin (CRESTOR) 40 MG tablet Take 20  mg by mouth Daily.   Yes Historical Provider, MD   tamsulosin (FLOMAX) 0.4 MG capsule 24 hr capsule Take 1 capsule by mouth Every Night.   Yes Historical Provider, MD   triamterene-hydrochlorothiazide (MAXZIDE) 75-50 MG per tablet Take 1 tablet by mouth Daily.   Yes Historical Provider, MD   acarbose (PRECOSE) 50 MG tablet Take 50 mg by mouth. Take 1/2 tab three times a day    Historical Provider, MD   albuterol (PROVENTIL HFA;VENTOLIN HFA) 108 (90 BASE) MCG/ACT inhaler Inhale 2 puffs Every 6 (Six) Hours As Needed for wheezing.    Historical Provider, MD   carboxymethylcellulose (REFRESH PLUS) 0.5 % solution 1 drop 4 (Four) Times a Day As Needed for dry eyes.    Historical Provider, MD   etodolac (LODINE) 400 MG tablet Take 400 mg by mouth 2 (Two) Times a Day As Needed (pain).    Historical Provider, MD   FLUTICASONE PROPIONATE, NASAL, NA 50 mcg into each nostril Daily. 2 SPRAYS IN EACH NOSTRIL    Historical Provider, MD   FOLIC ACID PO Take  by mouth.    Historical Provider, MD   insulin aspart (novoLOG) 100 UNIT/ML injection Inject 5 Units under the skin 3 (Three) Times a Day Before Meals.    Historical Provider, MD   nitroglycerin (NITROSTAT) 0.4 MG SL tablet Place 0.4 mg under the tongue Every 5 (Five) Minutes As Needed for chest pain. Take no more than 3 doses in 15 minutes.    Historical Provider, MD   psyllium (METAMUCIL) 58.6 % packet Take  by mouth Daily. 1 TABLESPOON    Historical Provider, MD   urea (CARMOL) 40 % ointment Apply  topically Daily.    Historical Provider, MD       Current Facility-Administered Medications   Medication Dose Route Frequency Provider Last Rate Last Dose   • acetaminophen (TYLENOL) tablet 650 mg  650 mg Oral Q6H PRN Rick Raymond MD       • ALPRAZolam (XANAX) tablet 0.25 mg  0.25 mg Oral Q8H PRN Rick Raymond MD   0.25 mg at 07/13/17 7307   • aluminum-magnesium hydroxide-simethicone (MAALOX MAX) 400-400-40 MG/5ML suspension 30 mL  30 mL Oral Q6H PRN Rick Raymond MD       •  aspirin chewable tablet 81 mg  81 mg Oral Daily Rick Raymond MD       • dextrose (D50W) solution 25 g  25 g Intravenous Q15 Min PRN Rick Raymond MD       • dextrose (GLUTOSE) oral gel 15 g  15 g Oral Q15 Min PRN Rick Raymond MD       • enoxaparin (LOVENOX) syringe 40 mg  40 mg Subcutaneous Daily Rick Raymond MD       • glucagon (human recombinant) (GLUCAGEN DIAGNOSTIC) injection 1 mg  1 mg Subcutaneous Q15 Min PRN Rick Raymond MD       • HYDROcodone-acetaminophen (NORCO) 5-325 MG per tablet 1 tablet  1 tablet Oral Q6H PRN Rick Raymond MD       • insulin detemir (LEVEMIR) injection 25 Units  25 Units Subcutaneous Q12H Rick Raymond MD   25 Units at 07/14/17 0059   • insulin lispro (humaLOG) injection 2-7 Units  2-7 Units Subcutaneous 4x Daily With Meals & Nightly Rick Raymond MD       • ipratropium-albuterol (DUO-NEB) nebulizer solution 3 mL  3 mL Nebulization Q4H PRN Rick Raymond MD       • isosorbide dinitrate (ISORDIL) tablet 20 mg  20 mg Oral Q12H Rick Raymond MD   20 mg at 07/14/17 0058   • lamoTRIgine (LaMICtal) tablet 200 mg  200 mg Oral Q12H Rick Raymond MD       • levETIRAcetam (KEPPRA) tablet 1,500 mg  1,500 mg Oral Daily Hamilton Mari DO       • levETIRAcetam (KEPPRA) tablet 2,000 mg  2,000 mg Oral Nightly Hamilton Mari DO       • lisinopril (PRINIVIL,ZESTRIL) tablet 20 mg  20 mg Oral Q24H Rick Raymond MD       • metoprolol tartrate (LOPRESSOR) tablet 50 mg  50 mg Oral Q12H Rick Raymond MD   50 mg at 07/13/17 2357   • Morphine sulfate (PF) injection 2 mg  2 mg Intravenous Q2H PRN Rick Raymond MD   2 mg at 07/13/17 2357   • nitroglycerin (NITROSTAT) SL tablet 0.4 mg  0.4 mg Sublingual Q5 Min PRN Rick Raymond MD       • ondansetron (ZOFRAN) injection 4 mg  4 mg Intravenous Q6H PRN Rick Raymond MD       • pantoprazole (PROTONIX) EC tablet 40 mg  40 mg Oral BID Rick Raymond MD   40 mg at 07/14/17 0602   • psyllium (METAMUCIL MULTIHEALTH FIBER) 58.12 % packet 1 packet  1 packet Oral Daily  Rick Raymond MD       • ranolazine (RANEXA) 12 hr tablet 500 mg  500 mg Oral Q12H Rick Raymond MD   500 mg at 07/14/17 0059   • rosuvastatin (CRESTOR) tablet 20 mg  20 mg Oral Daily Rick Raymond MD       • sodium chloride 0.9 % flush 1-10 mL  1-10 mL Intravenous PRN Rick Raymond MD       • tamsulosin (FLOMAX) 24 hr capsule 0.4 mg  0.4 mg Oral Nightly Rick Raymond MD   0.4 mg at 07/14/17 0059   • triamterene-hydrochlorothiazide (MAXZIDE) 75-50 MG per tablet 1 tablet  1 tablet Oral Daily Rick Raymond MD           Allergies:  Atorvastatin; Flagyl [metronidazole]; and Ciprofloxacin    Review of Systems  Review of Systems   Constitution: Negative for chills, decreased appetite, fever, malaise/fatigue, weight gain and weight loss.   HENT: Negative for headaches and nosebleeds.    Eyes: Negative for visual disturbance.   Cardiovascular: Positive for chest pain and dyspnea on exertion. Negative for leg swelling, near-syncope, orthopnea, palpitations, paroxysmal nocturnal dyspnea and syncope.   Respiratory: Positive for shortness of breath. Negative for cough, hemoptysis and snoring.    Endocrine: Negative for cold intolerance and heat intolerance.   Hematologic/Lymphatic: Negative for bleeding problem. Does not bruise/bleed easily.   Skin: Negative for rash.   Musculoskeletal: Negative for back pain and falls.   Gastrointestinal: Negative for abdominal pain, constipation, diarrhea, heartburn, melena, nausea and vomiting.   Genitourinary: Negative for hematuria.   Neurological: Negative for dizziness and light-headedness.   Psychiatric/Behavioral: Negative for altered mental status.   Allergic/Immunologic: Negative for persistent infections.       Objective     Physical Exam:  Patient Vitals for the past 24 hrs:   BP Temp Temp src Pulse Resp SpO2 Height Weight   07/14/17 0700 112/82 97.3 °F (36.3 °C) Temporal Art 68 18 95 % - -   07/14/17 0400 123/75 98.9 °F (37.2 °C) Temporal Art 88 20 97 % - -   07/13/17 2357 147/93  "98.3 °F (36.8 °C) Temporal Art 65 18 97 % 72\" (182.9 cm) 250 lb 3.2 oz (113 kg)   07/13/17 2246 138/89 - - 66 - 95 % - -   07/13/17 2231 143/86 - - 63 - 92 % - -   07/13/17 2147 130/82 - - 71 - 93 % - -   07/13/17 2116 130/83 - - 72 - 94 % - -   07/13/17 2031 124/82 - - 74 - 98 % - -   07/13/17 1916 127/80 - - 80 18 96 % - -   07/13/17 1901 121/76 - - 75 17 99 % - -   07/13/17 1846 126/77 - - 76 16 98 % - -   07/13/17 1831 122/92 - - 75 16 99 % - -   07/13/17 1830 - - - - - - 72\" (182.9 cm) 270 lb 9.6 oz (123 kg)   07/13/17 1827 135/80 - - 77 - 96 % - -   07/13/17 1825 135/80 98.2 °F (36.8 °C) - 77 12 96 % - -     Physical Exam   Constitutional: He is oriented to person, place, and time. He appears well-developed and well-nourished.   HENT:   Head: Normocephalic and atraumatic.   Eyes: Pupils are equal, round, and reactive to light.   Neck: Normal range of motion. Neck supple. No JVD present. Carotid bruit is not present.   Cardiovascular: Normal rate, regular rhythm and intact distal pulses.    Murmur heard.  Pulmonary/Chest: Effort normal. He has decreased breath sounds.   Abdominal: Soft. Bowel sounds are normal.   Musculoskeletal: Normal range of motion.   Neurological: He is alert and oriented to person, place, and time. He has normal reflexes.   Skin: Skin is warm and dry.   Psychiatric: He has a normal mood and affect. His behavior is normal. Judgment and thought content normal.       Results Review:   I reviewed the patient's new clinical results.  Lab Results (last 72 hours)     Procedure Component Value Units Date/Time    Comprehensive Metabolic Panel [[id number]]  (Abnormal) Collected:  07/13/17 1832    Specimen:  Blood Updated:  07/13/17 1853     Glucose 346 (H) mg/dL      BUN 23 (H) mg/dL      Creatinine 1.32 mg/dL      Sodium 135 mmol/L      Potassium 4.6 mmol/L      Chloride 103 mmol/L      CO2 20.0 (L) mmol/L      Calcium 9.5 mg/dL      Total Protein 7.2 g/dL      Albumin 4.30 g/dL      ALT (SGPT) 56 " (H) U/L      AST (SGOT) 35 U/L      Alkaline Phosphatase 95 U/L      Total Bilirubin 0.7 mg/dL      eGFR Non African Amer 56 (L) mL/min/1.73      Globulin 2.9 gm/dL      A/G Ratio 1.5 g/dL      BUN/Creatinine Ratio 17.4     Anion Gap 12.0 mmol/L     Amylase [[id number]]  (Normal) Collected:  07/13/17 1832    Specimen:  Blood Updated:  07/13/17 1853     Amylase 68 U/L     Lipase [[id number]]  (Normal) Collected:  07/13/17 1832    Specimen:  Blood Updated:  07/13/17 1853     Lipase 46 U/L     BNP [[id number]]  (Normal) Collected:  07/13/17 1832    Specimen:  Blood Updated:  07/13/17 1905     proBNP 57.3 pg/mL     Troponin [[id number]]  (Normal) Collected:  07/13/17 1832    Specimen:  Blood Updated:  07/13/17 1905     Troponin I <0.012 ng/mL     CBC & Differential [[id number]] Collected:  07/13/17 1832    Specimen:  Blood Updated:  07/13/17 1949    Narrative:       The following orders were created for panel order CBC & Differential.  Procedure                               Abnormality         Status                     ---------                               -----------         ------                     CBC Auto Differential[[id number]]        Abnormal            Final result                 Please view results for these tests on the individual orders.    CBC Auto Differential [[id number]]  (Abnormal) Collected:  07/13/17 1832    Specimen:  Blood Updated:  07/13/17 1949     WBC 4.33 (L) 10*3/mm3      RBC 4.25 (L) 10*6/mm3      Hemoglobin 12.5 (L) g/dL      Hematocrit 37.3 (L) %      MCV 87.8 fL      MCH 29.4 pg      MCHC 33.5 g/dL      RDW 12.8 %      RDW-SD 41.0 fl      MPV 12.0 fL      Platelets 137 10*3/mm3      Neutrophil % 46.8 %      Lymphocyte % 41.3 %      Monocyte % 7.9 %      Eosinophil % 3.5 %      Basophil % 0.5 %      Immature Grans % 0.0 %      Neutrophils, Absolute 2.03 10*3/mm3      Lymphocytes, Absolute 1.79 10*3/mm3      Monocytes, Absolute 0.34 10*3/mm3      Eosinophils, Absolute 0.15 10*3/mm3       Basophils, Absolute 0.02 10*3/mm3      Immature Grans, Absolute 0.00 10*3/mm3     Protime-INR [[id number]]  (Abnormal) Collected:  07/13/17 1833    Specimen:  Blood Updated:  07/13/17 1957     Protime 12.0 Seconds      INR 0.86 (L)    aPTT [[id number]]  (Normal) Collected:  07/13/17 1833    Specimen:  Blood Updated:  07/13/17 1957     PTT 27.6 seconds     D-dimer, Quantitative [[id number]]  (Normal) Collected:  07/13/17 1833    Specimen:  Blood Updated:  07/13/17 1957     D-Dimer, Quantitative 0.34 mg/L (FEU)     Narrative:       Reference Range is 0-0.50 mg/L FEU. However, results <0.50 mg/L FEU tends to rule out DVT or PE. Results >0.50 mg/L FEU are not useful in predicting absence or presence of DVT or PE.    [facility] Draw [[id number]] Collected:  07/13/17 1832    Specimen:  Blood Updated:  07/13/17 2001    Narrative:       The following orders were created for panel order [facility] Draw.  Procedure                               Abnormality         Status                     ---------                               -----------         ------                     Light Blue Top[[id number]]                                   Final result               Green Top (Gel)[[id number]]                                  Final result               Lavender Top[[id number]]                                     Final result               Red Top[[id number]]                                          Final result                 Please view results for these tests on the individual orders.    Light Blue Top [[id number]] Collected:  07/13/17 1833    Specimen:  Blood Updated:  07/13/17 2001     Extra Tube hold for add-on      Auto resulted       Green Top (Gel) [[id number]] Collected:  07/13/17 1832    Specimen:  Blood Updated:  07/13/17 2001     Extra Tube Hold for add-ons.      Auto resulted.       Lavender Top [[id number]] Collected:  07/13/17 1832    Specimen:  Blood Updated:  07/13/17 2001     Extra Tube hold for add-on      Auto resulted        Red Top [[id number]] Collected:  07/13/17 1832    Specimen:  Blood Updated:  07/13/17 2001     Extra Tube Hold for add-ons.      Auto resulted.       Urinalysis With / Culture If Indicated [[id number]]  (Abnormal) Collected:  07/13/17 2005    Specimen:  Urine from Urine, Clean Catch Updated:  07/13/17 2017     Color, UA Yellow     Appearance, UA Clear     pH, UA 5.5     Specific Gravity, UA 1.029     Glucose, UA >=1000 mg/dL (3+) (A)     Ketones, UA Trace (A)     Bilirubin, UA Negative     Blood, UA Negative     Protein, UA Negative     Leuk Esterase, UA Negative     Nitrite, UA Negative     Urobilinogen, UA 1.0 E.U./dL    Narrative:       Urine microscopic not indicated.    Troponin [[id number]]  (Normal) Collected:  07/13/17 2202    Specimen:  Blood Updated:  07/13/17 2237     Troponin I <0.012 ng/mL     POC Glucose Fingerstick [[id number]]  (Abnormal) Collected:  07/14/17 0047    Specimen:  Blood Updated:  07/14/17 0106     Glucose 342 (H) mg/dL       : [id number] PherDeep Driver SusanMeter ID: [id number]       Troponin [[id number]]  (Normal) Collected:  07/14/17 0013    Specimen:  Blood Updated:  07/14/17 0115     Troponin I <0.012 ng/mL     CBC (No Diff) [[id number]]  (Abnormal) Collected:  07/14/17 0413    Specimen:  Blood Updated:  07/14/17 0433     WBC 4.57 (L) 10*3/mm3      RBC 4.28 (L) 10*6/mm3      Hemoglobin 12.6 (L) g/dL      Hematocrit 37.5 (L) %      MCV 87.6 fL      MCH 29.4 pg      MCHC 33.6 g/dL      RDW 12.8 %      RDW-SD 41.0 fl      MPV 11.2 fL      Platelets 114 (L) 10*3/mm3     Magnesium [[id number]]  (Normal) Collected:  07/14/17 0413    Specimen:  Blood Updated:  07/14/17 0446     Magnesium 1.8 mg/dL     Basic Metabolic Panel [[id number]]  (Abnormal) Collected:  07/14/17 0413    Specimen:  Blood Updated:  07/14/17 0446     Glucose 318 (H) mg/dL      BUN 22 (H) mg/dL      Creatinine 1.08 mg/dL      Sodium 137 mmol/L      Potassium 5.4 (H) mmol/L      Chloride 103 mmol/L      CO2 24.0 mmol/L      Calcium  9.8 mg/dL      eGFR Non African Amer 70 mL/min/1.73      BUN/Creatinine Ratio 20.4     Anion Gap 10.0 mmol/L     Narrative:       GFR Normal >60  Chronic Kidney Disease <60  Kidney Failure <15    Troponin [[id number]]  (Normal) Collected:  07/14/17 0413    Specimen:  Blood Updated:  07/14/17 0456     Troponin I <0.012 ng/mL     TSH [[id number]]  (Normal) Collected:  07/14/17 0413    Specimen:  Blood Updated:  07/14/17 0516     TSH 1.820 mIU/mL     Lipid Panel [[id number]]  (Abnormal) Collected:  07/14/17 0413    Specimen:  Blood Updated:  07/14/17 0703     Total Cholesterol 144 mg/dL      Triglycerides 387 (H) mg/dL      HDL Cholesterol 26 (L) mg/dL      LDL Cholesterol  45 mg/dL      LDL/HDL Ratio 1.56    Hemoglobin A1c [[id number]] Collected:  07/14/17 0413    Specimen:  Blood Updated:  07/14/17 0734     Hemoglobin A1C 9.4 %     Narrative:       Less than 6.0           Non-Diabetic Range  6.0-7.0                 ADA Therapeutic Target  Greater than 7.0        Action Suggested    POC Glucose Fingerstick [[id number]]  (Abnormal) Collected:  07/14/17 0820    Specimen:  Blood Updated:  07/14/17 0851     Glucose 207 (H) mg/dL       : [id number] Lisa SolitariondaMeter ID: [id number]             Lab Results   Component Value Date    ECHOEFEST 55 01/17/2017       Imaging Results (last 72 hours)     Procedure Component Value Units Date/Time    XR Chest 1 View [[id number]] Collected:  07/13/17 1955     Updated:  07/13/17 2000    Narrative:       EXAMINATION: XR CHEST 1 VW- 7/13/2017 7:55 PM CDT     HISTORY: Chest pain.     REPORT: Comparison is made with the study from 01/17/2017.     Lungs are mildly hypoaerated, there is mild central basilar vascular  crowding, heart size is normal. There is no focal pulmonary infiltrate  or consolidation. No pneumothorax or effusion is seen. Extensive  costochondral calcification is noted at the junction of the first ribs  with the sternum. No acute osseous findings.       Impression:        No acute cardiopulmonary abnormality.  This report was finalized on 07/13/2017 19:57 by Dr. Cyril Reyna MD.        Assessment/Plan     Active Problems:    Chest pain    Coronary Artery Disease-known smell vessel disease    Type II Diabetes-poorly controlled    Mixed Hyperlipidemia    Essential Hypertension-currently with some hypotension     Gastroesophageal reflux disease without esophagitis  Plan:    Will discuss with Dr. Ramey possible further ischemic work up.     NPO    Consider treatment/work up for COPD    If not going to cath, could consider increase antianginals, concern with blood pressure   Further orders per Dr. Ramey    Thank you for asking us to follow this patient with you.     Americo Waldrop, TEENA  07/14/17  10:27 AM

## 2017-07-14 NOTE — PLAN OF CARE
Problem: Patient Care Overview (Adult)  Goal: Plan of Care Review  Outcome: Ongoing (interventions implemented as appropriate)    07/14/17 0333   Coping/Psychosocial Response Interventions   Plan Of Care Reviewed With patient   Patient Care Overview   Progress progress toward functional goals as expected   Outcome Evaluation   Outcome Summary/Follow up Plan Admitted to  for chest pain. Relief of pain with morphine 2mg x1. consult dr bentley this am. Admitted to dr pittman. q4vs, IS 14. CM negative. Continue to monitor.        Goal: Adult Individualization and Mutuality  Outcome: Ongoing (interventions implemented as appropriate)  Goal: Discharge Needs Assessment  Outcome: Ongoing (interventions implemented as appropriate)    Problem: Acute Coronary Syndrome (ACS) (Adult)  Goal: Signs and Symptoms of Listed Potential Problems Will be Absent or Manageable (Acute Coronary Syndrome)  Outcome: Ongoing (interventions implemented as appropriate)

## 2017-07-14 NOTE — H&P
"    [facility] Medicine Services  HISTORY AND PHYSICAL    Date of Admission: 7/13/2017  Primary Care Physician: GINA Felder    Subjective     Chief Complaint: \"I'm having chest pain again.\"    History of Present Illness:  The patient is a 57-year-old  male who presents with a chief complaint of chest pain.  He describes his chest pain as a pressure sensation that does not seem to radiate.  He took a dose of nitroglycerin earlier with improvement in his symptoms.  He relates his chest pain is associated with \"drenching sweats\" and nausea.  In addition, he relates feeling \"lightheaded\" and dizzy with the chest pain.  His symptoms started at approximately 4:30 PM today and have been persistent.   Presently he is awake and alert.  He has no complaints.  He is in no apparent distress.  Initial cardiac enzyme studies, EKG, and chest x-ray are unrevealing.  The patient has a known history of coronary artery disease with a history of MI.  He has multiple risk factors for heart disease.  The patient will require admission to the hospital for further evaluation and treatment.    Review of Systems   Constitutional: Positive for diaphoresis. Negative for chills, fatigue and fever.   HENT: Negative for congestion, rhinorrhea and sore throat.    Eyes: Negative for pain, discharge and redness.   Respiratory: Positive for chest tightness and shortness of breath. Negative for cough, wheezing and stridor.    Cardiovascular: Positive for chest pain. Negative for palpitations and leg swelling.   Gastrointestinal: Negative for abdominal distention, abdominal pain, constipation, diarrhea, nausea and vomiting.   Endocrine: Negative for polydipsia and polyphagia.   Genitourinary: Negative for dysuria, frequency, hematuria and urgency.   Musculoskeletal: Negative for arthralgias, joint swelling and myalgias.   Allergic/Immunologic: Negative for immunocompromised state.   Neurological: " Positive for dizziness and light-headedness. Negative for seizures, syncope, facial asymmetry, speech difficulty, numbness and headaches.   Hematological: Does not bruise/bleed easily.   Psychiatric/Behavioral: Negative for agitation, behavioral problems, confusion, dysphoric mood and hallucinations. The patient is not nervous/anxious.       Past Medical History:   Past Medical History:   Diagnosis Date   • Asthma    • Coronary artery disease    • Diabetes mellitus    • Diverticulitis    • GERD (gastroesophageal reflux disease)    • Hyperlipidemia    • Hypertension    • Kidney stone    • Murmur, heart    • Myocardial infarction    • Seizures    • Stroke        Past Surgical History:  Past Surgical History:   Procedure Laterality Date   • CARDIAC CATHETERIZATION  2016    Dr. Broadbent; widely patent previously placed stents in the left anterior descending and obstructive disease involving the diagonal branch which was treated medically   • CORONARY ANGIOPLASTY     • CORONARY STENT PLACEMENT      Pt states 5 or 6 stents   • HERNIA REPAIR      x2   • KIDNEY STONE SURGERY     • KNEE SURGERY Right    • THUMB AMPUTATION Left     partial   • TOE AMPUTATION Right     big     Social History:The patient is a resident of [place], [place].  He is .  He has no children.  He is a retired [employer].  He has a high school education.  He served in the [employer].  He relates a remote history of tobacco use.  He quit smoking in .  He has a remote history of alcohol use.  He has no history of drug use.  He is [employer].  He has no recent history of travel outside this region.    He is a full code and designates his wife, Daniela, to serve as a surrogate for healthcare matters should such become necessary.    Family History:  He has 2 sisters in poor health.  One sister has a history of heart disease.  Both parents are now .  His father  due to heart attack and his mother  due to natural causes of uncertain  type.    Allergies:  Allergies   Allergen Reactions   • Atorvastatin Anaphylaxis   • Flagyl [Metronidazole] Anaphylaxis   • Ciprofloxacin Hives       Medications:  Prior to Admission medications    Medication Sig Start Date End Date Taking? Authorizing Provider   aspirin 81 MG chewable tablet Chew 1 tablet Daily. 2/4/17  Yes TEENA Borges   insulin glargine (LANTUS) 100 UNIT/ML injection Inject 55 Units under the skin Daily.   Yes Historical Provider, MD   isosorbide dinitrate (ISORDIL) 20 MG tablet Take 1 tablet by mouth 2 (Two) Times a Day. 10/18/16  Yes TEENA Garcia   lamoTRIgine (LAMICTAL) 200 MG tablet Take 1 tablet by mouth 2 (Two) Times a Day. 4/21/17  Yes Hamilton White MD   levETIRAcetam (KEPPRA) 500 MG tablet Take 3 tablets in the morning and 4 tablets at bedtime 4/21/17  Yes Hamilton White MD   lisinopril (PRINIVIL,ZESTRIL) 40 MG tablet Take 40 mg by mouth. Take a 1/2 tab at bedtime by mouth   Yes Historical Provider, MD   metFORMIN (GLUCOPHAGE) 1000 MG tablet Take 1,000 mg by mouth 2 (Two) Times a Day With Meals.   Yes Historical Provider, MD   metoprolol tartrate (LOPRESSOR) 50 MG tablet Take 1 tablet by mouth 2 (Two) Times a Day. 10/18/16  Yes TEENA Garcia   pantoprazole (PROTONIX) 40 MG EC tablet Take 40 mg by mouth 2 (Two) Times a Day.   Yes Historical Provider, MD   ranolazine (RANEXA) 500 MG 12 hr tablet Take 1 tablet by mouth 2 (Two) Times a Day. 5/12/17  Yes Wade Ramey MD   rosuvastatin (CRESTOR) 40 MG tablet Take 20 mg by mouth Daily.   Yes Historical Provider, MD   tamsulosin (FLOMAX) 0.4 MG capsule 24 hr capsule Take 1 capsule by mouth Every Night.   Yes Historical Provider, MD   triamterene-hydrochlorothiazide (MAXZIDE) 75-50 MG per tablet Take 1 tablet by mouth Daily.   Yes Historical Provider, MD   acarbose (PRECOSE) 50 MG tablet Take 50 mg by mouth. Take 1/2 tab three times a day    Historical Provider, MD   albuterol (PROVENTIL HFA;VENTOLIN HFA)  "108 (90 BASE) MCG/ACT inhaler Inhale 2 puffs Every 6 (Six) Hours As Needed for wheezing.    Historical Provider, MD   carboxymethylcellulose (REFRESH PLUS) 0.5 % solution 1 drop 4 (Four) Times a Day As Needed for dry eyes.    Historical Provider, MD   etodolac (LODINE) 400 MG tablet Take 400 mg by mouth 2 (Two) Times a Day As Needed (pain).    Historical Provider, MD   FLUTICASONE PROPIONATE, NASAL, NA 50 mcg into each nostril Daily. 2 SPRAYS IN EACH NOSTRIL    Historical Provider, MD   FOLIC ACID PO Take  by mouth.    Historical Provider, MD   insulin aspart (novoLOG) 100 UNIT/ML injection Inject 5 Units under the skin 3 (Three) Times a Day Before Meals.    Historical Provider, MD   nitroglycerin (NITROSTAT) 0.4 MG SL tablet Place 0.4 mg under the tongue Every 5 (Five) Minutes As Needed for chest pain. Take no more than 3 doses in 15 minutes.    Historical Provider, MD   psyllium (METAMUCIL) 58.6 % packet Take  by mouth Daily. 1 TABLESPOON    Historical Provider, MD   urea (CARMOL) 40 % ointment Apply  topically Daily.    Historical Provider, MD       Objective     Vital Signs: /93 (BP Location: Right arm, Patient Position: Lying)  Pulse 65  Temp 98.3 °F (36.8 °C) (Temporal Artery )   Resp 18  Ht 72\" (182.9 cm)  Wt 250 lb 3.2 oz (113 kg)  SpO2 97%  BMI 33.93 kg/m2     Physical Exam   Constitutional: He is oriented to person, place, and time. He appears well-developed and well-nourished. No distress.   HENT:   Head: Normocephalic and atraumatic.   Nose: Nose normal.   Eyes: Right eye exhibits no discharge. Left eye exhibits no discharge. No scleral icterus.   Neck: Neck supple. No JVD present.   Cardiovascular: Normal rate and regular rhythm.  Exam reveals no gallop and no friction rub.    Murmur heard.  Pulmonary/Chest: Effort normal and breath sounds normal. No stridor. No respiratory distress. He has no wheezes. He has no rales.   Abdominal: Soft. Bowel sounds are normal. He exhibits no distension. " There is no tenderness. There is no guarding.   Musculoskeletal: He exhibits no edema, tenderness or deformity.   Neurological: He is alert and oriented to person, place, and time. No cranial nerve deficit. He exhibits normal muscle tone. Coordination normal.   Skin: Skin is warm and dry. No rash noted. He is not diaphoretic. No erythema. No pallor.   Psychiatric: He has a normal mood and affect. His behavior is normal. Judgment and thought content normal.     Results Reviewed:  Lab Results (last 24 hours)     Procedure Component Value Units Date/Time    Comprehensive Metabolic Panel [[id number]]  (Abnormal) Collected:  07/13/17 1832    Specimen:  Blood Updated:  07/13/17 1853     Glucose 346 (H) mg/dL      BUN 23 (H) mg/dL      Creatinine 1.32 mg/dL      Sodium 135 mmol/L      Potassium 4.6 mmol/L      Chloride 103 mmol/L      CO2 20.0 (L) mmol/L      Calcium 9.5 mg/dL      Total Protein 7.2 g/dL      Albumin 4.30 g/dL      ALT (SGPT) 56 (H) U/L      AST (SGOT) 35 U/L      Alkaline Phosphatase 95 U/L      Total Bilirubin 0.7 mg/dL      eGFR Non African Amer 56 (L) mL/min/1.73      Globulin 2.9 gm/dL      A/G Ratio 1.5 g/dL      BUN/Creatinine Ratio 17.4     Anion Gap 12.0 mmol/L     Amylase [[id number]]  (Normal) Collected:  07/13/17 1832    Specimen:  Blood Updated:  07/13/17 1853     Amylase 68 U/L     Lipase [[id number]]  (Normal) Collected:  07/13/17 1832    Specimen:  Blood Updated:  07/13/17 1853     Lipase 46 U/L     BNP [[id number]]  (Normal) Collected:  07/13/17 1832    Specimen:  Blood Updated:  07/13/17 1905     proBNP 57.3 pg/mL     Troponin [[id number]]  (Normal) Collected:  07/13/17 1832    Specimen:  Blood Updated:  07/13/17 1905     Troponin I <0.012 ng/mL     CBC & Differential [[id number]] Collected:  07/13/17 1832    Specimen:  Blood Updated:  07/13/17 1949    Narrative:       The following orders were created for panel order CBC & Differential.  Procedure                               Abnormality          Status                     ---------                               -----------         ------                     CBC Auto Differential[[id number]]        Abnormal            Final result                 Please view results for these tests on the individual orders.    CBC Auto Differential [[id number]]  (Abnormal) Collected:  07/13/17 1832    Specimen:  Blood Updated:  07/13/17 1949     WBC 4.33 (L) 10*3/mm3      RBC 4.25 (L) 10*6/mm3      Hemoglobin 12.5 (L) g/dL      Hematocrit 37.3 (L) %      MCV 87.8 fL      MCH 29.4 pg      MCHC 33.5 g/dL      RDW 12.8 %      RDW-SD 41.0 fl      MPV 12.0 fL      Platelets 137 10*3/mm3      Neutrophil % 46.8 %      Lymphocyte % 41.3 %      Monocyte % 7.9 %      Eosinophil % 3.5 %      Basophil % 0.5 %      Immature Grans % 0.0 %      Neutrophils, Absolute 2.03 10*3/mm3      Lymphocytes, Absolute 1.79 10*3/mm3      Monocytes, Absolute 0.34 10*3/mm3      Eosinophils, Absolute 0.15 10*3/mm3      Basophils, Absolute 0.02 10*3/mm3      Immature Grans, Absolute 0.00 10*3/mm3     Protime-INR [[id number]]  (Abnormal) Collected:  07/13/17 1833    Specimen:  Blood Updated:  07/13/17 1957     Protime 12.0 Seconds      INR 0.86 (L)    aPTT [[id number]]  (Normal) Collected:  07/13/17 1833    Specimen:  Blood Updated:  07/13/17 1957     PTT 27.6 seconds     D-dimer, Quantitative [[id number]]  (Normal) Collected:  07/13/17 1833    Specimen:  Blood Updated:  07/13/17 1957     D-Dimer, Quantitative 0.34 mg/L (FEU)     Narrative:       Reference Range is 0-0.50 mg/L FEU. However, results <0.50 mg/L FEU tends to rule out DVT or PE. Results >0.50 mg/L FEU are not useful in predicting absence or presence of DVT or PE.    [place] Draw [[id number]] Collected:  07/13/17 1832    Specimen:  Blood Updated:  07/13/17 2001    Narrative:       The following orders were created for panel order [place] Draw.  Procedure                               Abnormality         Status                     ---------                                -----------         ------                     Light Blue Top[[id number]]                                   Final result               Green Top (Gel)[[id number]]                                  Final result               Lavender Top[[id number]]                                     Final result               Red Top[[id number]]                                          Final result                 Please view results for these tests on the individual orders.    Light Blue Top [[id number]] Collected:  07/13/17 1833    Specimen:  Blood Updated:  07/13/17 2001     Extra Tube hold for add-on      Auto resulted       Green Top (Gel) [[id number]] Collected:  07/13/17 1832    Specimen:  Blood Updated:  07/13/17 2001     Extra Tube Hold for add-ons.      Auto resulted.       Lavender Top [[id number]] Collected:  07/13/17 1832    Specimen:  Blood Updated:  07/13/17 2001     Extra Tube hold for add-on      Auto resulted       Red Top [[id number]] Collected:  07/13/17 1832    Specimen:  Blood Updated:  07/13/17 2001     Extra Tube Hold for add-ons.      Auto resulted.       Urinalysis With / Culture If Indicated [[id number]]  (Abnormal) Collected:  07/13/17 2005    Specimen:  Urine from Urine, Clean Catch Updated:  07/13/17 2017     Color, UA Yellow     Appearance, UA Clear     pH, UA 5.5     Specific Gravity, UA 1.029     Glucose, UA >=1000 mg/dL (3+) (A)     Ketones, UA Trace (A)     Bilirubin, UA Negative     Blood, UA Negative     Protein, UA Negative     Leuk Esterase, UA Negative     Nitrite, UA Negative     Urobilinogen, UA 1.0 E.U./dL    Narrative:       Urine microscopic not indicated.    Troponin [[id number]]  (Normal) Collected:  07/13/17 2202    Specimen:  Blood Updated:  07/13/17 2237     Troponin I <0.012 ng/mL     POC Glucose Fingerstick [[id number]]  (Abnormal) Collected:  07/14/17 0047    Specimen:  Blood Updated:  07/14/17 0106     Glucose 342 (H) mg/dL       : [id number] [employer] Susan[employer]eter ID:  [id number]       Troponin [[id number]]  (Normal) Collected:  07/14/17 0013    Specimen:  Blood Updated:  07/14/17 0115     Troponin I <0.012 ng/mL         Imaging Results (last 24 hours)     Procedure Component Value Units Date/Time    XR Chest 1 View [[id number]] Collected:  07/13/17 1955     Updated:  07/13/17 2000    Narrative:       EXAMINATION: XR CHEST 1 VW- 7/13/2017 7:55 PM CDT     HISTORY: Chest pain.     REPORT: Comparison is made with the study from 01/17/2017.     Lungs are mildly hypoaerated, there is mild central basilar vascular  crowding, heart size is normal. There is no focal pulmonary infiltrate  or consolidation. No pneumothorax or effusion is seen. Extensive  costochondral calcification is noted at the junction of the first ribs  with the sternum. No acute osseous findings.       Impression:       No acute cardiopulmonary abnormality.  This report was finalized on 07/13/2017 19:57 by Dr. Cyril Reyna MD.        I have personally reviewed and interpreted the radiology studies and ECG obtained at time of admission.     Assessment / Plan     Assessment & Plan    Impression:  Chest pain  CAD  HTN  HLD  DM II  Seizure disorder  Cerebrovascular disease with history of CVA  GERD    Plan:  The patient will be admitted for further evaluation and treatment.  His admitting diagnoses are as noted.  His condition at this time is judged to be stable.  He will be placed on telemetry.    I have asked the nursing staff to obtain vital signs per protocol.  He will be confined to bedrest with bathroom privileges with assistance.  His allergies are as noted above.  I have asked the nursing staff to monitor input and output.  Daily weights are to be obtained.  He will be maintained on  an ADA diet.  IV fluids will be saline locked.  Oxygen will be used as needed to maintain his O2 saturations greater than 92%.  He is a full code.  Fall precautions are to be instituted.  I will consult the cardiology service.    He  will be continued on most of his usual home medications at the same dose and schedule.  Please refer to the medical record for specifics.    Additional when necessary medications will include Tylenol, Xanax, Maalox, duo nebs, morphine, Nitrostat, and Zofran.    I will obtain additional follow-up laboratory studies in the morning including serial troponins studies.    I will continue to follow Mr. Boyer closely through the night pending the return of the hospitalist team in the morning.  The nursing staff may call should they have any questions or concerns.  Please refer to the medical record for additional information, orders, and/or comments.      Rick Raymond MD   07/14/17   2:08 AM

## 2017-07-14 NOTE — PROGRESS NOTES
Discharge Planning Assessment   [place]     Patient Name: Carlos A Boyer Jr.  MRN: [id number]  Today's Date: 7/14/2017    Admit Date: 7/13/2017          Discharge Needs Assessment       07/14/17 0818    Living Environment    Lives With spouse    Living Arrangements mobile home    Home Accessibility no concerns    Stair Railings at Home none    Type of Financial/Environmental Concern none    Transportation Available family or friend will provide    Living Environment    Provides Primary Care For no one    Able to Return to Prior Living Arrangements yes    Discharge Needs Assessment    Concerns To Be Addressed no discharge needs identified;denies needs/concerns at this time    Readmission Within The Last 30 Days no previous admission in last 30 days    Anticipated Changes Related to Illness none    Equipment Currently Used at Home none    Equipment Needed After Discharge none    Discharge Disposition home or self-care            Discharge Plan       07/14/17 0860    Case Management/Social Work Plan    Plan PT resides at home with spouse and is independent. PT is not homebound and does not qualify for  at this time. PT plans to dc home with no known needs. Will follow.     Patient/Family In Agreement With Plan yes        Discharge Placement     No information found                Demographic Summary     None            Functional Status     None            Psychosocial     None            Abuse/Neglect     None            Legal     None            Substance Abuse     None            Patient Forms     None          VIKI Flores

## 2017-07-18 ENCOUNTER — PROCEDURE VISIT (OUTPATIENT)
Dept: UROLOGY | Facility: CLINIC | Age: 57
End: 2017-07-18

## 2017-07-18 DIAGNOSIS — Z87.442 HISTORY OF KIDNEY STONES: Primary | ICD-10-CM

## 2017-07-18 DIAGNOSIS — N40.1 BENIGN NON-NODULAR PROSTATIC HYPERPLASIA WITH LOWER URINARY TRACT SYMPTOMS: ICD-10-CM

## 2017-07-18 LAB
BILIRUB BLD-MCNC: NEGATIVE MG/DL
CLARITY, POC: CLEAR
COLOR UR: YELLOW
GLUCOSE UR STRIP-MCNC: NEGATIVE MG/DL
KETONES UR QL: NEGATIVE
LEUKOCYTE EST, POC: NEGATIVE
NITRITE UR-MCNC: NEGATIVE MG/ML
PH UR: 5.5 [PH] (ref 5–8)
PROT UR STRIP-MCNC: NEGATIVE MG/DL
RBC # UR STRIP: NEGATIVE /UL
SP GR UR: 1.01 (ref 1–1.03)
UROBILINOGEN UR QL: NORMAL

## 2017-07-18 PROCEDURE — 52000 CYSTOURETHROSCOPY: CPT | Performed by: UROLOGY

## 2017-07-18 PROCEDURE — 99213 OFFICE O/P EST LOW 20 MIN: CPT | Performed by: UROLOGY

## 2017-07-18 PROCEDURE — 81003 URINALYSIS AUTO W/O SCOPE: CPT | Performed by: UROLOGY

## 2017-07-18 NOTE — PROGRESS NOTES
Pre- operative diagnosis:  Benign prostatic hypertrophy    Post operative diagnosis:  BPH    Procedure:  The patient was prepped and draped in a normal sterile fashion.  The urethra was anesthetized with 2% lidocaine jelly.  A flexible cystoscope was introduced per urethra.      Urethra:  Normal    Bladder:  There is no evidence of a stone, foreign body or mass within the bladder.  The bladder is minimally trabeculated.  The bladder neck is without contracture.    Ureteral orifices:  Normal position bilaterally    Prostate:  lateral lobe hypertrophy    Patient tolerated the procedure well    Complications: none    Blood loss: minimal    Follow up:    Schedule for OR  Urolift      Mr. Boyer is 57 y.o. male    Chief Complaint   Patient presents with   • Benign Prostatic Hypertrophy       History of Present Illness    The following portions of the patient's history were reviewed and updated as appropriate: allergies, current medications, past family history, past medical history, past social history, past surgical history and problem list.    Review of Systems   Constitutional: Negative for chills and fever.   Gastrointestinal: Negative for abdominal pain, anal bleeding and blood in stool.   Genitourinary: Positive for difficulty urinating. Negative for flank pain and hematuria.         Current Outpatient Prescriptions:   •  acarbose (PRECOSE) 50 MG tablet, Take 50 mg by mouth. Take 1/2 tab three times a day, Disp: , Rfl:   •  albuterol (PROVENTIL HFA;VENTOLIN HFA) 108 (90 BASE) MCG/ACT inhaler, Inhale 2 puffs Every 6 (Six) Hours As Needed for wheezing., Disp: , Rfl:   •  aspirin 81 MG chewable tablet, Chew 1 tablet Daily., Disp: , Rfl:   •  carboxymethylcellulose (REFRESH PLUS) 0.5 % solution, 1 drop 4 (Four) Times a Day As Needed for dry eyes., Disp: , Rfl:   •  etodolac (LODINE) 400 MG tablet, Take 400 mg by mouth 2 (Two) Times a Day As Needed (pain)., Disp: , Rfl:   •  FLUTICASONE PROPIONATE, NASAL, NA, 50 mcg  into each nostril Daily. 2 SPRAYS IN EACH NOSTRIL, Disp: , Rfl:   •  FOLIC ACID PO, Take  by mouth., Disp: , Rfl:   •  insulin aspart (novoLOG) 100 UNIT/ML injection, Inject 5 Units under the skin 3 (Three) Times a Day Before Meals., Disp: , Rfl:   •  insulin glargine (LANTUS) 100 UNIT/ML injection, Inject 55 Units under the skin Daily., Disp: , Rfl:   •  isosorbide dinitrate (ISORDIL) 20 MG tablet, Take 1 tablet by mouth 2 (Two) Times a Day., Disp: 60 tablet, Rfl: 0  •  lamoTRIgine (LAMICTAL) 200 MG tablet, Take 1 tablet by mouth 2 (Two) Times a Day., Disp: 60 tablet, Rfl: 4  •  levETIRAcetam (KEPPRA) 500 MG tablet, Take 3 tablets in the morning and 4 tablets at bedtime, Disp: 215 tablet, Rfl: 4  •  lisinopril (PRINIVIL,ZESTRIL) 40 MG tablet, Take 40 mg by mouth. Take a 1/2 tab at bedtime by mouth, Disp: , Rfl:   •  metFORMIN (GLUCOPHAGE) 1000 MG tablet, Take 1,000 mg by mouth 2 (Two) Times a Day With Meals., Disp: , Rfl:   •  metoprolol tartrate (LOPRESSOR) 50 MG tablet, Take 1 tablet by mouth 2 (Two) Times a Day., Disp: 60 tablet, Rfl: 0  •  nitroglycerin (NITROSTAT) 0.4 MG SL tablet, Place 0.4 mg under the tongue Every 5 (Five) Minutes As Needed for chest pain. Take no more than 3 doses in 15 minutes., Disp: , Rfl:   •  pantoprazole (PROTONIX) 40 MG EC tablet, Take 40 mg by mouth 2 (Two) Times a Day., Disp: , Rfl:   •  psyllium (METAMUCIL) 58.6 % packet, Take  by mouth Daily. 1 TABLESPOON, Disp: , Rfl:   •  ranolazine (RANEXA) 500 MG 12 hr tablet, Take 1 tablet by mouth 2 (Two) Times a Day., Disp: 60 tablet, Rfl: 11  •  rosuvastatin (CRESTOR) 40 MG tablet, Take 20 mg by mouth Daily., Disp: , Rfl:   •  tamsulosin (FLOMAX) 0.4 MG capsule 24 hr capsule, Take 1 capsule by mouth Every Night., Disp: , Rfl:   •  triamterene-hydrochlorothiazide (MAXZIDE) 75-50 MG per tablet, Take 1 tablet by mouth Daily., Disp: , Rfl:   •  urea (CARMOL) 40 % ointment, Apply  topically Daily., Disp: , Rfl:     Past Medical History:    Diagnosis Date   • Asthma    • Coronary artery disease    • Diabetes mellitus    • Diverticulitis    • GERD (gastroesophageal reflux disease)    • Hyperlipidemia    • Hypertension    • Kidney stone    • Murmur, heart    • Myocardial infarction    • Seizures    • Stroke        Past Surgical History:   Procedure Laterality Date   • CARDIAC CATHETERIZATION  01/2016    Dr. Broadbent; widely patent previously placed stents in the left anterior descending and obstructive disease involving the diagonal branch which was treated medically   • CARDIAC CATHETERIZATION N/A 7/14/2017    Procedure: Left Heart Cath;  Surgeon: Wade Ramey MD;  Location:  [facility];  Service:    • CORONARY ANGIOPLASTY     • CORONARY STENT PLACEMENT      Pt states 5 or 6 stents   • HERNIA REPAIR      x2   • KIDNEY STONE SURGERY     • KNEE SURGERY Right    • THUMB AMPUTATION Left     partial   • TOE AMPUTATION Right     big       Social History     Social History   • Marital status:      Spouse name: N/A   • Number of children: N/A   • Years of education: N/A     Social History Main Topics   • Smoking status: Former Smoker     Quit date: 1993   • Smokeless tobacco: Former User     Types: Chew     Quit date: 2009      Comment: Pt quit 25 years ago   • Alcohol use No      Comment: quit 2009   • Drug use: No   • Sexual activity: Defer     Other Topics Concern   • None     Social History Narrative       Family History   Problem Relation Age of Onset   • Heart disease Father    • Heart disease Maternal Grandmother        Objective    There were no vitals taken for this visit.    Physical Exam   Constitutional: He is oriented to person, place, and time. He appears well-developed and well-nourished. No distress.   Pulmonary/Chest: Effort normal.   Abdominal: Soft. He exhibits no distension and no mass. There is no tenderness. There is no rebound and no guarding. No hernia.   Neurological: He is alert and oriented to person,  place, and time.   Skin: Skin is warm and dry. He is not diaphoretic.   Psychiatric: He has a normal mood and affect.   Vitals reviewed.      Admission on 07/13/2017, Discharged on 07/14/2017   Component Date Value Ref Range Status   • Extra Tube 07/13/2017 hold for add-on   Final    Auto resulted   • Extra Tube 07/13/2017 Hold for add-ons.   Final    Auto resulted.   • Extra Tube 07/13/2017 hold for add-on   Final    Auto resulted   • Extra Tube 07/13/2017 Hold for add-ons.   Final    Auto resulted.   • Glucose 07/13/2017 346* 70 - 100 mg/dL Final   • BUN 07/13/2017 23* 5 - 21 mg/dL Final   • Creatinine 07/13/2017 1.32  0.50 - 1.40 mg/dL Final   • Sodium 07/13/2017 135  135 - 145 mmol/L Final   • Potassium 07/13/2017 4.6  3.5 - 5.3 mmol/L Final   • Chloride 07/13/2017 103  98 - 110 mmol/L Final   • CO2 07/13/2017 20.0* 24.0 - 31.0 mmol/L Final   • Calcium 07/13/2017 9.5  8.4 - 10.4 mg/dL Final   • Total Protein 07/13/2017 7.2  6.3 - 8.7 g/dL Final   • Albumin 07/13/2017 4.30  3.50 - 5.00 g/dL Final   • ALT (SGPT) 07/13/2017 56* 0 - 54 U/L Final   • AST (SGOT) 07/13/2017 35  7 - 45 U/L Final   • Alkaline Phosphatase 07/13/2017 95  24 - 120 U/L Final   • Total Bilirubin 07/13/2017 0.7  0.1 - 1.0 mg/dL Final   • eGFR Non African Amer 07/13/2017 56* >60 mL/min/1.73 Final   • Globulin 07/13/2017 2.9  gm/dL Final   • A/G Ratio 07/13/2017 1.5  1.1 - 2.5 g/dL Final   • BUN/Creatinine Ratio 07/13/2017 17.4  7.0 - 25.0 Final   • Anion Gap 07/13/2017 12.0  4.0 - 13.0 mmol/L Final   • Protime 07/13/2017 12.0  11.9 - 14.6 Seconds Final   • INR 07/13/2017 0.86* 0.91 - 1.09 Final   • PTT 07/13/2017 27.6  24.1 - 34.8 seconds Final   • D-Dimer, Quantitative 07/13/2017 0.34  0.00 - 0.50 mg/L (FEU) Final   • proBNP 07/13/2017 57.3  0.0 - 900.0 pg/mL Final   • Amylase 07/13/2017 68  30 - 110 U/L Final   • Lipase 07/13/2017 46  23 - 203 U/L Final   • Color, UA 07/13/2017 Yellow  Yellow, Straw Final   • Appearance, UA 07/13/2017 Clear   Clear Final   • pH, UA 07/13/2017 5.5  5.0 - 8.0 Final   • Specific Gravity, UA 07/13/2017 1.029  1.005 - 1.030 Final   • Glucose, UA 07/13/2017 >=1000 mg/dL (3+)* Negative Final   • Ketones, UA 07/13/2017 Trace* Negative Final   • Bilirubin, UA 07/13/2017 Negative  Negative Final   • Blood, UA 07/13/2017 Negative  Negative Final   • Protein, UA 07/13/2017 Negative  Negative Final   • Leuk Esterase, UA 07/13/2017 Negative  Negative Final   • Nitrite, UA 07/13/2017 Negative  Negative Final   • Urobilinogen, UA 07/13/2017 1.0 E.U./dL  0.2 - 1.0 E.U./dL Final   • Troponin I 07/13/2017 <0.012  0.000 - 0.034 ng/mL Final   • Troponin I 07/13/2017 <0.012  0.000 - 0.034 ng/mL Final   • WBC 07/13/2017 4.33* 4.80 - 10.80 10*3/mm3 Final   • RBC 07/13/2017 4.25* 4.80 - 5.90 10*6/mm3 Final   • Hemoglobin 07/13/2017 12.5* 14.0 - 18.0 g/dL Final   • Hematocrit 07/13/2017 37.3* 40.0 - 52.0 % Final   • MCV 07/13/2017 87.8  82.0 - 95.0 fL Final   • MCH 07/13/2017 29.4  28.0 - 32.0 pg Final   • MCHC 07/13/2017 33.5  33.0 - 36.0 g/dL Final   • RDW 07/13/2017 12.8  12.0 - 15.0 % Final   • RDW-SD 07/13/2017 41.0  40.0 - 54.0 fl Final   • MPV 07/13/2017 12.0  6.0 - 12.0 fL Final   • Platelets 07/13/2017 137  130 - 400 10*3/mm3 Final   • Neutrophil % 07/13/2017 46.8  39.0 - 78.0 % Final   • Lymphocyte % 07/13/2017 41.3  15.0 - 45.0 % Final   • Monocyte % 07/13/2017 7.9  4.0 - 12.0 % Final   • Eosinophil % 07/13/2017 3.5  0.0 - 4.0 % Final   • Basophil % 07/13/2017 0.5  0.0 - 2.0 % Final   • Immature Grans % 07/13/2017 0.0  0.0 - 5.0 % Final   • Neutrophils, Absolute 07/13/2017 2.03  1.87 - 8.40 10*3/mm3 Final   • Lymphocytes, Absolute 07/13/2017 1.79  0.72 - 4.86 10*3/mm3 Final   • Monocytes, Absolute 07/13/2017 0.34  0.19 - 1.30 10*3/mm3 Final   • Eosinophils, Absolute 07/13/2017 0.15  0.00 - 0.70 10*3/mm3 Final   • Basophils, Absolute 07/13/2017 0.02  0.00 - 0.20 10*3/mm3 Final   • Immature Grans, Absolute 07/13/2017 0.00  0.00 - 0.03  10*3/mm3 Final   • Troponin I 07/14/2017 <0.012  0.000 - 0.034 ng/mL Final   • Troponin I 07/14/2017 <0.012  0.000 - 0.034 ng/mL Final   • Glucose 07/14/2017 342* 70 - 130 mg/dL Final    : [id number] Naida MirandaMeter ID: [id number]   • Glucose 07/14/2017 318* 70 - 100 mg/dL Final   • BUN 07/14/2017 22* 5 - 21 mg/dL Final   • Creatinine 07/14/2017 1.08  0.50 - 1.40 mg/dL Final   • Sodium 07/14/2017 137  135 - 145 mmol/L Final   • Potassium 07/14/2017 5.4* 3.5 - 5.3 mmol/L Final   • Chloride 07/14/2017 103  98 - 110 mmol/L Final   • CO2 07/14/2017 24.0  24.0 - 31.0 mmol/L Final   • Calcium 07/14/2017 9.8  8.4 - 10.4 mg/dL Final   • eGFR Non African Amer 07/14/2017 70  >60 mL/min/1.73 Final   • BUN/Creatinine Ratio 07/14/2017 20.4  7.0 - 25.0 Final   • Anion Gap 07/14/2017 10.0  4.0 - 13.0 mmol/L Final   • WBC 07/14/2017 4.57* 4.80 - 10.80 10*3/mm3 Final   • RBC 07/14/2017 4.28* 4.80 - 5.90 10*6/mm3 Final   • Hemoglobin 07/14/2017 12.6* 14.0 - 18.0 g/dL Final   • Hematocrit 07/14/2017 37.5* 40.0 - 52.0 % Final   • MCV 07/14/2017 87.6  82.0 - 95.0 fL Final   • MCH 07/14/2017 29.4  28.0 - 32.0 pg Final   • MCHC 07/14/2017 33.6  33.0 - 36.0 g/dL Final   • RDW 07/14/2017 12.8  12.0 - 15.0 % Final   • RDW-SD 07/14/2017 41.0  40.0 - 54.0 fl Final   • MPV 07/14/2017 11.2  6.0 - 12.0 fL Final   • Platelets 07/14/2017 114* 130 - 400 10*3/mm3 Final   • TSH 07/14/2017 1.820  0.470 - 4.680 mIU/mL Final   • Hemoglobin A1C 07/14/2017 9.4  % Final   • Magnesium 07/14/2017 1.8  1.4 - 2.2 mg/dL Final   • Troponin I 07/14/2017 <0.012  0.000 - 0.034 ng/mL Final   • Total Cholesterol 07/14/2017 144  130 - 200 mg/dL Final   • Triglycerides 07/14/2017 387* 0 - 149 mg/dL Final   • HDL Cholesterol 07/14/2017 26* >=40 mg/dL Final   • LDL Cholesterol  07/14/2017 45  0 - 99 mg/dL Final   • LDL/HDL Ratio 07/14/2017 1.56   Final   • Glucose 07/14/2017 207* 70 - 130 mg/dL Final    : [id number] Lisa PerezaMeter ID: [id number]    • Glucose 07/14/2017 238* 70 - 130 mg/dL Final    : [id number] Lisa PerezaMeter ID: [id number]       Results for orders placed or performed in visit on 07/18/17   POC Urinalysis Dipstick, Automated   Result Value Ref Range    Color Yellow Yellow, Straw, Dark Yellow, Rosalia    Clarity, UA Clear Clear    Glucose, UA Negative Negative, 1000 mg/dL (3+) mg/dL    Bilirubin Negative Negative    Ketones, UA Negative Negative    Specific Gravity  1.010 1.005 - 1.030    Blood, UA Negative Negative    pH, Urine 5.5 5.0 - 8.0    Protein, POC Negative Negative mg/dL    Urobilinogen, UA Normal Normal    Leukocytes Negative Negative    Nitrite, UA Negative Negative     Assessment and Plan    Carlos A was seen today for benign prostatic hypertrophy.    Diagnoses and all orders for this visit:    History of kidney stones    Benign non-nodular prostatic hyperplasia with lower urinary tract symptoms  -     POC Urinalysis Dipstick, Automated      He does have bilateral lobar hypertrophy which is obstructive.  I think that this may be the cause of his symptoms.  He was given options for treatment including but not limited to TURP, greenlight, continue medications, Urolift.   He has chosen urolift.  He understands the risks of bleeding, infection, damage to surrounding tissue, pelvic pain, urethral stricture, urinary retention, persistence of symptoms.

## 2017-07-19 ENCOUNTER — PREP FOR SURGERY (OUTPATIENT)
Dept: OTHER | Facility: HOSPITAL | Age: 57
End: 2017-07-19

## 2017-07-19 DIAGNOSIS — N40.1 BENIGN NON-NODULAR PROSTATIC HYPERPLASIA WITH LOWER URINARY TRACT SYMPTOMS: Primary | ICD-10-CM

## 2017-07-19 RX ORDER — SODIUM CHLORIDE 0.9 % (FLUSH) 0.9 %
1-10 SYRINGE (ML) INJECTION AS NEEDED
Status: CANCELLED | OUTPATIENT
Start: 2017-07-19

## 2017-08-09 ENCOUNTER — TELEPHONE (OUTPATIENT)
Dept: UROLOGY | Facility: CLINIC | Age: 57
End: 2017-08-09

## 2017-08-09 PROBLEM — N40.1 BENIGN NON-NODULAR PROSTATIC HYPERPLASIA WITH LOWER URINARY TRACT SYMPTOMS: Status: ACTIVE | Noted: 2017-08-09

## 2017-08-09 NOTE — TELEPHONE ENCOUNTER
Called and left message of patient's surgery info for the Urolift. [facility] did [facility]. I have schd the patient for August 25 arrive at [facility] at 6:00 am, NPO after midnight. Preop is on 8/18 at 2:45 pm, he doesn't have to be NPO for this. Needs to stop his aspirin and any other blood thinners he might be taking on 8/18 as well.      Awaiting call back to confirm all info left on voicemail.

## 2017-08-16 ENCOUNTER — TELEPHONE (OUTPATIENT)
Dept: NEUROLOGY | Facility: CLINIC | Age: 57
End: 2017-08-16

## 2017-08-16 NOTE — TELEPHONE ENCOUNTER
Left message on Carlos A phone. [facility] authorization has . Also talked with Daniela and she said she would have him call.

## 2017-08-17 ENCOUNTER — OFFICE VISIT (OUTPATIENT)
Dept: NEUROLOGY | Facility: CLINIC | Age: 57
End: 2017-08-17

## 2017-08-17 VITALS
HEIGHT: 72 IN | SYSTOLIC BLOOD PRESSURE: 132 MMHG | WEIGHT: 256 LBS | OXYGEN SATURATION: 97 % | DIASTOLIC BLOOD PRESSURE: 60 MMHG | BODY MASS INDEX: 34.67 KG/M2 | RESPIRATION RATE: 18 BRPM | HEART RATE: 72 BPM

## 2017-08-17 DIAGNOSIS — Z79.4 TYPE 2 DIABETES MELLITUS WITHOUT COMPLICATION, WITH LONG-TERM CURRENT USE OF INSULIN (HCC): ICD-10-CM

## 2017-08-17 DIAGNOSIS — I69.30 CHRONIC LEFT ARTERIAL ISCHEMIC STROKE, ICA (INTERNAL CAROTID ARTERY): ICD-10-CM

## 2017-08-17 DIAGNOSIS — I10 ESSENTIAL HYPERTENSION: Primary | ICD-10-CM

## 2017-08-17 DIAGNOSIS — G47.33 OSA ON CPAP: ICD-10-CM

## 2017-08-17 DIAGNOSIS — I77.9 CAROTID DISEASE, BILATERAL (HCC): ICD-10-CM

## 2017-08-17 DIAGNOSIS — G40.909 SEIZURE DISORDER (HCC): ICD-10-CM

## 2017-08-17 DIAGNOSIS — E11.9 TYPE 2 DIABETES MELLITUS WITHOUT COMPLICATION, WITH LONG-TERM CURRENT USE OF INSULIN (HCC): ICD-10-CM

## 2017-08-17 DIAGNOSIS — Z99.89 OSA ON CPAP: ICD-10-CM

## 2017-08-17 PROCEDURE — 99213 OFFICE O/P EST LOW 20 MIN: CPT | Performed by: CLINICAL NURSE SPECIALIST

## 2017-08-17 RX ORDER — LEVETIRACETAM 500 MG/1
TABLET ORAL
Qty: 215 TABLET | Refills: 5 | Status: ON HOLD | OUTPATIENT
Start: 2017-08-17 | End: 2018-03-15

## 2017-08-17 RX ORDER — AMOXICILLIN 875 MG/1
875 TABLET, COATED ORAL 2 TIMES DAILY
COMMUNITY
End: 2017-08-18

## 2017-08-17 RX ORDER — LAMOTRIGINE 200 MG/1
200 TABLET ORAL 2 TIMES DAILY
Qty: 60 TABLET | Refills: 5 | Status: SHIPPED | OUTPATIENT
Start: 2017-08-17 | End: 2017-08-18 | Stop reason: SDUPTHER

## 2017-08-17 NOTE — PROGRESS NOTES
Subjective     Chief Complaint   Patient presents with   • Seizures     patient stating no issues        Carlos A Boyer Jr. is a 57 y.o. male ambidextrous retiree, .  He is here today for follow up for seizures. He was last seen 5/19/17. He denies seizures, spells, involuntary tremor. He is tolerating Keppra 500 mg 3 in the AM and 4 in the PM and lamictal 200 mg BID. He did [facility]pp[facility]ital [facility] and I do not have the results. He did have overnight pulse ox with CPAP that showed 44 seconds < 90%. He did have carotid duplex that showed 50-6% stenosis LICA and less than 50% stenosis ROSS.     He did have MRI done here 5/5/17 that showed hippocampal asymetry and is scheduled for repeat MRI in 12/11/17 for comparison.    As you recall, He is a prior patient of Dr. REJI Roa.  He had an extensive work up at [facility] and found to have seizure disorder. He has been taking Keppra and Lamictal ever since. .       Seizures    This is a chronic (2012, would occur 2-4 times a month and last up to 10 mintues) problem. Episode onset: last episode was about April 2017. Duration: last about 5 minutes now with medications. Associated symptoms include headaches. Pertinent negatives include no confusion, no visual disturbance, no chest pain, no nausea, no vomiting and no diarrhea. Characteristics include rhythmic jerking. Characteristics do not include bowel incontinence, bladder incontinence, loss of consciousness, bit tongue or apnea. never had LOC, would have tremoring from inside out, and would have tremor in both arms and rarely in legs. would have staring , can hear Associated symptoms comments: fatigue.        Current Outpatient Prescriptions   Medication Sig Dispense Refill   • acarbose (PRECOSE) 50 MG tablet Take 50 mg by mouth. Take 1/2 tab three times a day     • albuterol (PROVENTIL HFA;VENTOLIN HFA) 108 (90 BASE) MCG/ACT inhaler Inhale 2 puffs Every 6  (Six) Hours As Needed for wheezing.     • amoxicillin (AMOXIL) 875 MG tablet Take 875 mg by mouth 2 (Two) Times a Day.     • aspirin 81 MG chewable tablet Chew 1 tablet Daily.     • carboxymethylcellulose (REFRESH PLUS) 0.5 % solution 1 drop 4 (Four) Times a Day As Needed for dry eyes.     • etodolac (LODINE) 400 MG tablet Take 400 mg by mouth 2 (Two) Times a Day As Needed (pain).     • FLUTICASONE PROPIONATE, NASAL, NA 50 mcg into each nostril Daily. 2 SPRAYS IN EACH NOSTRIL     • FOLIC ACID PO Take  by mouth.     • insulin aspart (novoLOG) 100 UNIT/ML injection Inject 5 Units under the skin 3 (Three) Times a Day Before Meals.     • insulin glargine (LANTUS) 100 UNIT/ML injection Inject 55 Units under the skin Daily.     • isosorbide dinitrate (ISORDIL) 20 MG tablet Take 1 tablet by mouth 2 (Two) Times a Day. 60 tablet 0   • lamoTRIgine (LAMICTAL) 200 MG tablet Take 1 tablet by mouth 2 (Two) Times a Day. 60 tablet 5   • levETIRAcetam (KEPPRA) 500 MG tablet Take 3 tablets in the morning and 4 tablets at bedtime 215 tablet 5   • lisinopril (PRINIVIL,ZESTRIL) 40 MG tablet Take 40 mg by mouth. Take a 1/2 tab at bedtime by mouth     • metFORMIN (GLUCOPHAGE) 1000 MG tablet Take 1,000 mg by mouth 2 (Two) Times a Day With Meals.     • metoprolol tartrate (LOPRESSOR) 50 MG tablet Take 1 tablet by mouth 2 (Two) Times a Day. 60 tablet 0   • nitroglycerin (NITROSTAT) 0.4 MG SL tablet Place 0.4 mg under the tongue Every 5 (Five) Minutes As Needed for chest pain. Take no more than 3 doses in 15 minutes.     • pantoprazole (PROTONIX) 40 MG EC tablet Take 40 mg by mouth 2 (Two) Times a Day.     • psyllium (METAMUCIL) 58.6 % packet Take  by mouth Daily. 1 TABLESPOON     • ranolazine (RANEXA) 500 MG 12 hr tablet Take 1 tablet by mouth 2 (Two) Times a Day. 60 tablet 11   • rosuvastatin (CRESTOR) 40 MG tablet Take 20 mg by mouth Daily.     • tamsulosin (FLOMAX) 0.4 MG capsule 24 hr capsule Take 1 capsule by mouth Every Night.     •  triamterene-hydrochlorothiazide (MAXZIDE) 75-50 MG per tablet Take 1 tablet by mouth Daily.     • urea (CARMOL) 40 % ointment Apply  topically Daily.       No current facility-administered medications for this visit.        Past Medical History:   Diagnosis Date   • Asthma    • Coronary artery disease    • Diabetes mellitus    • Diverticulitis    • GERD (gastroesophageal reflux disease)    • Hyperlipidemia    • Hypertension    • Kidney stone    • Murmur, heart    • Myocardial infarction    • Seizures    • Stroke        Past Surgical History:   Procedure Laterality Date   • CARDIAC CATHETERIZATION  01/2016    Dr. Broadbent; widely patent previously placed stents in the left anterior descending and obstructive disease involving the diagonal branch which was treated medically   • CARDIAC CATHETERIZATION N/A 7/14/2017    Procedure: Left Heart Cath;  Surgeon: Wade Ramey MD;  Location: [facility] CATH INVASIVE LOCATION;  Service:    • CORONARY ANGIOPLASTY     • CORONARY STENT PLACEMENT      Pt states 5 or 6 stents   • HERNIA REPAIR      x2   • KIDNEY STONE SURGERY     • KNEE SURGERY Right    • THUMB AMPUTATION Left     partial   • TOE AMPUTATION Right     big       family history includes Heart disease in his father and maternal grandmother.    Social History   Substance Use Topics   • Smoking status: Former Smoker     Quit date: 1993   • Smokeless tobacco: Former User     Types: Chew     Quit date: 2009      Comment: Pt quit 25 years ago   • Alcohol use No      Comment: quit 2009       Review of Systems   Constitutional: Negative.  Negative for fatigue and fever.   HENT: Negative for hearing loss, postnasal drip and sinus pressure.    Eyes: Negative.  Negative for visual disturbance.   Respiratory: Negative.  Negative for apnea, chest tightness and shortness of breath.    Cardiovascular: Negative.  Negative for chest pain.   Gastrointestinal: Negative.  Negative for bowel incontinence, constipation, diarrhea, nausea and  "vomiting.   Endocrine: Negative.    Genitourinary: Negative.  Negative for bladder incontinence, dysuria and frequency.   Musculoskeletal: Positive for back pain. Negative for arthralgias, gait problem and myalgias.   Skin: Negative.    Allergic/Immunologic: Negative.    Neurological: Positive for seizures and headaches. Negative for dizziness, tremors, loss of consciousness and weakness.   Hematological: Negative.  Negative for adenopathy.   Psychiatric/Behavioral: Negative.  Negative for agitation, confusion and hallucinations.   All other systems reviewed and are negative.      Objective     /60 (BP Location: Left arm, Patient Position: Sitting, Cuff Size: Adult)  Pulse 72  Resp 18  Ht 72\" (182.9 cm)  Wt 256 lb (116 kg)  SpO2 97%  BMI 34.72 kg/m2, Body mass index is 34.72 kg/(m^2).    Physical Exam   Constitutional: He is oriented to person, place, and time. He appears well-developed and well-nourished. He is cooperative.   HENT:   Head: Normocephalic and atraumatic.   Right Ear: Hearing and external ear normal.   Left Ear: Hearing and external ear normal.   Nose: Nose normal.   Mouth/Throat: Oropharynx is clear and moist.   Eyes: Conjunctivae, EOM and lids are normal. Pupils are equal, round, and reactive to light.   Neck: Normal range of motion. Neck supple. Carotid bruit is not present.   Cardiovascular: Normal rate, regular rhythm, S1 normal, S2 normal and normal heart sounds.    No murmur heard.  Pulmonary/Chest: Effort normal and breath sounds normal.   Abdominal: Soft. Bowel sounds are normal.   Musculoskeletal: Normal range of motion.   Neurological: He is alert and oriented to person, place, and time. He has normal strength and normal reflexes. He displays no tremor. No cranial nerve deficit or sensory deficit. He exhibits normal muscle tone. He displays a negative Romberg sign. Coordination and gait normal. GCS eye subscore is 4. GCS verbal subscore is 5. GCS motor subscore is 6.   Reflex " Scores:       Tricep reflexes are 2+ on the right side and 2+ on the left side.       Bicep reflexes are 2+ on the right side and 2+ on the left side.       Brachioradialis reflexes are 2+ on the right side and 2+ on the left side.       Patellar reflexes are 2+ on the right side and 2+ on the left side.       Achilles reflexes are 2+ on the right side and 2+ on the left side.  Skin: Skin is warm and dry.   Psychiatric: He has a normal mood and affect. His speech is normal and behavior is normal. Cognition and memory are normal.   Nursing note and vitals reviewed.      Results for orders placed or performed in visit on 07/18/17   POC Urinalysis Dipstick, Automated   Result Value Ref Range    Color Yellow Yellow, Straw, Dark Yellow, Rosalia    Clarity, UA Clear Clear    Glucose, UA Negative Negative, 1000 mg/dL (3+) mg/dL    Bilirubin Negative Negative    Ketones, UA Negative Negative    Specific Gravity  1.010 1.005 - 1.030    Blood, UA Negative Negative    pH, Urine 5.5 5.0 - 8.0    Protein, POC Negative Negative mg/dL    Urobilinogen, UA Normal Normal    Leukocytes Negative Negative    Nitrite, UA Negative Negative      CAROTID DUPLEX: IMPRESSION:  Impression:  1. There is less than 50% stenosis of the right internal carotid artery.  2. There is 50-69% stenosis of the left internal carotid artery.  3. Antegrade flow is demonstrated in bilateral vertebral arteries.    ASSESSMENT/PLAN    Diagnoses and all orders for this visit:    Essential hypertension    Carotid disease, bilateral    HOA on CPAP    Type 2 diabetes mellitus without complication, with long-term current use of insulin    Seizure disorder    Chronic left arterial ischemic stroke, ICA (internal carotid artery)    Other orders  -     amoxicillin (AMOXIL) 875 MG tablet; Take 875 mg by mouth 2 (Two) Times a Day.  -     levETIRAcetam (KEPPRA) 500 MG tablet; Take 3 tablets in the morning and 4 tablets at bedtime  -     lamoTRIgine (LAMICTAL) 200 MG tablet;  Take 1 tablet by mouth 2 (Two) Times a Day.      MEDICAL DECISION MAKIN. Continue with  CPAP  2. Will compare and repeat MRI brain in 6 months, 2017.  3. Continue with Keppra 1500 mg in AM and 2000 mg in PM  4. Continue with lamictal 200 mg BID  5.  bp managed by PCP  6. Blood glucose managed by PCP and A1C goal less than 7  7. Statin per PCP for LDL goal less than 70.  8. Continue with ASA for secondary stroke prevention  9. Patient is counseled on stroke signs and symptoms using FAST and Time Saved is Brain Saved.  10. Discussed secondary stroke prevention to include a systolic blood pressure goal of less than 140, LDL goal less than 70, and 30-40 minutes of heart rate elevating exercise 3-4 times per week. Continue antiplatelet.   11.Seizure precautions were discussed to include no tub baths, no swimming, avoiding lack of sleep, and avoiding known triggers. Education given of things that may contribute to a seizure to include, but not limited to: stressful situations, fever, fatigue, lack of sleep, low blood sugar, hyperventilation, flashing lights, and caffeine. Instructions given to take seizure medications as prescribed. Education given to family member on what to do during a seizure and care following the seizure. Education given to contact this office prior to stopping or changing any medications.  12. Former smoker, currently not using tobacco  13.       Patient given printed education with AVS for diet/exerise with AVS and encouraged to include 30 minutes of exercise 3-4 times weekly.   allergies and all known medications/prescriptions have been reviewed using resources available on this encounter.    Return in about 4 months (around 2017).        TEENA Win

## 2017-08-18 ENCOUNTER — APPOINTMENT (OUTPATIENT)
Dept: PREADMISSION TESTING | Facility: HOSPITAL | Age: 57
End: 2017-08-18

## 2017-08-18 VITALS — HEART RATE: 76 BPM | WEIGHT: 254 LBS | HEIGHT: 71 IN | BODY MASS INDEX: 35.56 KG/M2 | OXYGEN SATURATION: 95 %

## 2017-08-18 LAB
ANION GAP SERPL CALCULATED.3IONS-SCNC: 12 MMOL/L (ref 4–13)
BILIRUB UR QL STRIP: NEGATIVE
BUN BLD-MCNC: 20 MG/DL (ref 5–21)
BUN/CREAT SERPL: 19.6 (ref 7–25)
CALCIUM SPEC-SCNC: 9.4 MG/DL (ref 8.4–10.4)
CHLORIDE SERPL-SCNC: 102 MMOL/L (ref 98–110)
CLARITY UR: CLEAR
CO2 SERPL-SCNC: 23 MMOL/L (ref 24–31)
COLOR UR: YELLOW
CREAT BLD-MCNC: 1.02 MG/DL (ref 0.5–1.4)
DEPRECATED RDW RBC AUTO: 42.6 FL (ref 40–54)
ERYTHROCYTE [DISTWIDTH] IN BLOOD BY AUTOMATED COUNT: 13.3 % (ref 12–15)
GFR SERPL CREATININE-BSD FRML MDRD: 75 ML/MIN/1.73
GLUCOSE BLD-MCNC: 266 MG/DL (ref 70–100)
GLUCOSE UR STRIP-MCNC: ABNORMAL MG/DL
HCT VFR BLD AUTO: 37.6 % (ref 40–52)
HGB BLD-MCNC: 12.7 G/DL (ref 14–18)
HGB UR QL STRIP.AUTO: NEGATIVE
KETONES UR QL STRIP: NEGATIVE
LEUKOCYTE ESTERASE UR QL STRIP.AUTO: NEGATIVE
MCH RBC QN AUTO: 30.1 PG (ref 28–32)
MCHC RBC AUTO-ENTMCNC: 33.8 G/DL (ref 33–36)
MCV RBC AUTO: 89.1 FL (ref 82–95)
NITRITE UR QL STRIP: NEGATIVE
PH UR STRIP.AUTO: 5.5 [PH] (ref 5–8)
PLATELET # BLD AUTO: 108 10*3/MM3 (ref 130–400)
PMV BLD AUTO: 11.6 FL (ref 6–12)
POTASSIUM BLD-SCNC: 4.6 MMOL/L (ref 3.5–5.3)
PROT UR QL STRIP: NEGATIVE
RBC # BLD AUTO: 4.22 10*6/MM3 (ref 4.8–5.9)
SODIUM BLD-SCNC: 137 MMOL/L (ref 135–145)
SP GR UR STRIP: >=1.03 (ref 1–1.03)
UROBILINOGEN UR QL STRIP: ABNORMAL
WBC NRBC COR # BLD: 5.02 10*3/MM3 (ref 4.8–10.8)

## 2017-08-18 PROCEDURE — 81003 URINALYSIS AUTO W/O SCOPE: CPT | Performed by: UROLOGY

## 2017-08-18 PROCEDURE — 80048 BASIC METABOLIC PNL TOTAL CA: CPT | Performed by: UROLOGY

## 2017-08-18 PROCEDURE — 85027 COMPLETE CBC AUTOMATED: CPT | Performed by: UROLOGY

## 2017-08-18 PROCEDURE — 87081 CULTURE SCREEN ONLY: CPT | Performed by: UROLOGY

## 2017-08-18 NOTE — DISCHARGE INSTRUCTIONS
DAY OF SURGERY INSTRUCTIONS        YOUR SURGEON: dr bennett    PROCEDURE: ***PROSTATIC URETHRAL LIFT    DATE OF SURGERY: ***8/25/2017    ARRIVAL TIME: AS DIRECTED BY OFFICE    DAY OF SURGERY TAKE ONLY THESE MEDICATIONS: ***isosorbide,metoprolol, ranexa            BEFORE YOU COME TO THE HOSPITAL  (Pre-op instructions)  • Do not eat, drink, smoke or chew gum after midnight the night before surgery.  This also includes no mints.  • Morning of surgery take only the medicines you have been instructed with a sip of water unless otherwise instructed  by your physician.  • Do not shave, wear makeup or dark nail polish.  • Remove all jewelry including rings.  • Leave anything you consider valuable at home.  • Leave your suitcase in the car until after your surgery.  • Bring the following with you if applicable:  o Picture ID and insurance, Medicare or Medicaid cards  o Co-pay/deductible required by insurance (cash, check, credit card)  o Copy of advance directive, living will or power-of- documents if not brought to PAT  o CPAP or BIPAP mask and tubing  o Relaxation aids (MP3 player, book, magazine)  • On the day of surgery check in at registration located at the main entrance of the hospital.       Outpatient Surgery Guidelines, Adult  Outpatient procedures are those for which the person having the procedure is allowed to go home the same day as the procedure. Various procedures are done on an outpatient basis. You should follow some general guidelines if you will be having an outpatient procedure.  LET YOUR HEALTH CARE PROVIDER KNOW ABOUT:  · Any allergies you have.  · All medicines you are taking, including vitamins, herbs, eye drops, creams, and over-the-counter medicines.  · Previous problems you or members of your family have had with the use of anesthetics.  · Any blood disorders you have.  · Previous surgeries you have had.  · Medical conditions you have.  RISKS AND COMPLICATIONS  Your health care provider will  discuss possible risks and complications with you before surgery. Common risks and complications include:    · Problems due to the use of anesthetics.  · Blood loss and replacement (does not apply to minor surgical procedures).  · Temporary increase in pain due to surgery.  · Uncorrected pain or problems that the surgery was meant to correct.  · Infection.  · New damage.  BEFORE THE PROCEDURE  · Ask your health care provider about changing or stopping your regular medicines. You may need to stop taking certain medicines in the days or weeks before the procedure.  · Stop smoking at least 2 weeks before surgery. This lowers your risk for complications during and after surgery. Ask your health care provider for help with this if needed.  · Eat your usual meals and a light supper the day before surgery. Continue fluid intake. Do not drink alcohol.  · Do not eat or drink after midnight the night before your surgery.   · Arrange for someone to take you home and to stay with you for 24 hours after the procedure. Medicine given for your procedure may affect your ability to drive or to care for yourself.  · Call your health care provider's office if you develop an illness or problem that may prevent you from safely having your procedure.  AFTER THE PROCEDURE  After surgery, you will be taken to a recovery area, where your progress will be monitored. If there are no complications, you will be allowed to go home when you are awake, stable, and taking fluids well. You may have numbness around the surgical site. Healing will take some time. You will have tenderness at the surgical site and may have some swelling and bruising. You may also have some nausea.  HOME CARE INSTRUCTIONS  · Do not drive for 24 hours, or as directed by your health care provider. Do not drive while taking prescription pain medicines.  · Do not drink alcohol for 24 hours.  · Do not make important decisions or sign legal documents for 24 hours.  · You may  resume a normal diet and activities as directed.  · Do not lift anything heavier than 10 pounds (4.5 kg) or play contact sports until your health care provider says it is okay.  · Change your bandages (dressings) as directed.  · Only take over-the-counter or prescription medicines as directed by your health care provider.  · Follow up with your health care provider as directed.  SEEK MEDICAL CARE IF:  · You have increased bleeding (more than a small spot) from the surgical site.  · You have redness, swelling, or increasing pain in the wound.  · You see pus coming from the wound.  · You have a fever.  · You notice a bad smell coming from the wound or dressing.  · You feel lightheaded or faint.  · You develop a rash.  · You have trouble breathing.  · You develop allergies.  MAKE SURE YOU:  · Understand these instructions.  · Will watch your condition.  · Will get help right away if you are not doing well or get worse.     This information is not intended to replace advice given to you by your health care provider. Make sure you discuss any questions you have with your health care provider.     Document Released: 09/12/2002 Document Revised: 05/03/2016 Document Reviewed: 05/22/2014  [employer] Interactive Patient Education ©2016 [employer]       Fall Prevention in Hospitals, Adult  As a hospital patient, your condition and the treatments you receive can increase your risk for falls. Some additional risk factors for falls in a hospital include:  · Being in an unfamiliar environment.  · Being on bed rest.  · Your surgery.  · Taking certain medicines.  · Your tubing requirements, such as intravenous (IV) therapy or catheters.  It is important that you learn how to decrease fall risks while at the hospital. Below are important tips that can help prevent falls.  SAFETY TIPS FOR PREVENTING FALLS  Talk about your risk of falling.  · Ask your health care provider why you are at risk for falling. Is it your medicine, illness,  tubing placement, or something else?  · Make a plan with your health care provider to keep you safe from falls.  · Ask your health care provider or pharmacist about side effects of your medicines. Some medicines can make you dizzy or affect your coordination.  Ask for help.  · Ask for help before getting out of bed. You may need to press your call button.  · Ask for assistance in getting safely to the toilet.  · Ask for a walker or cane to be put at your bedside. Ask that most of the side rails on your bed be placed up before your health care provider leaves the room.  · Ask family or friends to sit with you.  · Ask for things that are out of your reach, such as your glasses, hearing aids, telephone, bedside table, or call button.  Follow these tips to avoid falling:  · Stay lying or seated, rather than standing, while waiting for help.  · Wear rubber-soled slippers or shoes whenever you walk in the hospital.  · Avoid quick, sudden movements.  ¨ Change positions slowly.  ¨ Sit on the side of your bed before standing.  ¨ Stand up slowly and wait before you start to walk.  · Let your health care provider know if there is a spill on the floor.  · Pay careful attention to the medical equipment, electrical cords, and tubes around you.  · When you need help, use your call button by your bed or in the bathroom. Wait for one of your health care providers to help you.  · If you feel dizzy or unsure of your footing, return to bed and wait for assistance.  · Avoid being distracted by the TV, telephone, or another person in your room.  · Do not lean or support yourself on rolling objects, such as IV poles or bedside tables.     This information is not intended to replace advice given to you by your health care provider. Make sure you discuss any questions you have with your health care provider.     Document Released: 12/15/2001 Document Revised: 01/08/2016 Document Reviewed: 08/25/2013  [employer] Interactive Patient Education  ©2016 [employer]       Surgical Site Infections FAQs  What is a Surgical Site Infection (SSI)?  A surgical site infection is an infection that occurs after surgery in the part of the body where the surgery took place. Most patients who have surgery do not develop an infection. However, infections develop in about 1 to 3 out of every 100 patients who have surgery.  Some of the common symptoms of a surgical site infection are:  · Redness and pain around the area where you had surgery  · Drainage of cloudy fluid from your surgical wound  · Fever  Can SSIs be treated?  Yes. Most surgical site infections can be treated with antibiotics. The antibiotic given to you depends on the bacteria (germs) causing the infection. Sometimes patients with SSIs also need another surgery to treat the infection.  What are some of the things that hospitals are doing to prevent SSIs?  To prevent SSIs, doctors, nurses, and other healthcare providers:  · Clean their hands and arms up to their elbows with an antiseptic agent just before the surgery.  · Clean their hands with soap and water or an alcohol-based hand rub before and after caring for each patient.  · May remove some of your hair immediately before your surgery using electric clippers if the hair is in the same area where the procedure will occur. They should not shave you with a razor.  · Wear special hair covers, masks, gowns, and gloves during surgery to keep the surgery area clean.  · Give you antibiotics before your surgery starts. In most cases, you should get antibiotics within 60 minutes before the surgery starts and the antibiotics should be stopped within 24 hours after surgery.  · Clean the skin at the site of your surgery with a special soap that kills germs.  What can I do to help prevent SSIs?  Before your surgery:  · Tell your doctor about other medical problems you may have. Health problems such as allergies, diabetes, and obesity could affect your surgery and  your treatment.  · Quit smoking. Patients who smoke get more infections. Talk to your doctor about how you can quit before your surgery.  · Do not shave near where you will have surgery. Shaving with a razor can irritate your skin and make it easier to develop an infection.  At the time of your surgery:  · Speak up if someone tries to shave you with a razor before surgery. Ask why you need to be shaved and talk with your surgeon if you have any concerns.  · Ask if you will get antibiotics before surgery.  After your surgery:  · Make sure that your healthcare providers clean their hands before examining you, either with soap and water or an alcohol-based hand rub.  · If you do not see your providers clean their hands, please ask them to do so.  · Family and friends who visit you should not touch the surgical wound or dressings.  · Family and friends should clean their hands with soap and water or an alcohol-based hand rub before and after visiting you. If you do not see them clean their hands, ask them to clean their hands.  What do I need to do when I go home from the hospital?  · Before you go home, your doctor or nurse should explain everything you need to know about taking care of your wound. Make sure you understand how to care for your wound before you leave the hospital.  · Always clean your hands before and after caring for your wound.  · Before you go home, make sure you know who to contact if you have questions or problems after you get home.  · If you have any symptoms of an infection, such as redness and pain at the surgery site, drainage, or fever, call your doctor immediately.  If you have additional questions, please ask your doctor or nurse.  Developed and co-sponsored by The Society for Healthcare Epidemiology of [place] (SHEA); Infectious Diseases Society of [place] (IDSA); American Hospital Association; Association for Professionals in Infection Control and Epidemiology (APIC); Centers for Disease  Control and Prevention (CDC); and The Joint Commission.     This information is not intended to replace advice given to you by your health care provider. Make sure you discuss any questions you have with your health care provider.     Document Released: 12/23/2014 Document Revised: 01/08/2016 Document Reviewed: 03/02/2016  [employer] Interactive Patient Education ©2016 [employer]     PATIENT/FAMILY/RESPONSIBLE PARTY VERBALIZES UNDERSTANDING OF ABOVE EDUCATION.  COPY OF PAIN SCALE GIVEN AND REVIEWED WITH VERBALIZED UNDERSTANDING.

## 2017-08-20 LAB — MRSA SPEC QL CULT: NORMAL

## 2017-08-25 ENCOUNTER — ANESTHESIA EVENT (OUTPATIENT)
Dept: PERIOP | Facility: HOSPITAL | Age: 57
End: 2017-08-25

## 2017-08-25 ENCOUNTER — ANESTHESIA (OUTPATIENT)
Dept: PERIOP | Facility: HOSPITAL | Age: 57
End: 2017-08-25

## 2017-08-25 ENCOUNTER — HOSPITAL ENCOUNTER (OUTPATIENT)
Facility: HOSPITAL | Age: 57
Setting detail: HOSPITAL OUTPATIENT SURGERY
Discharge: HOME | End: 2017-08-25
Attending: UROLOGY | Admitting: UROLOGY

## 2017-08-25 VITALS
DIASTOLIC BLOOD PRESSURE: 85 MMHG | TEMPERATURE: 97.7 F | HEART RATE: 67 BPM | RESPIRATION RATE: 18 BRPM | OXYGEN SATURATION: 93 % | SYSTOLIC BLOOD PRESSURE: 133 MMHG

## 2017-08-25 DIAGNOSIS — N40.1 BENIGN NON-NODULAR PROSTATIC HYPERPLASIA WITH LOWER URINARY TRACT SYMPTOMS: ICD-10-CM

## 2017-08-25 LAB
GLUCOSE BLDC GLUCOMTR-MCNC: 236 MG/DL (ref 70–130)
GLUCOSE BLDC GLUCOMTR-MCNC: 300 MG/DL (ref 70–130)

## 2017-08-25 PROCEDURE — 52442 CYSTO INS TRNSPRSTC IMPLT EA: CPT | Performed by: UROLOGY

## 2017-08-25 PROCEDURE — 25010000003 CEFAZOLIN PER 500 MG: Performed by: UROLOGY

## 2017-08-25 PROCEDURE — 25010000002 METOCLOPRAMIDE PER 10 MG: Performed by: ANESTHESIOLOGY

## 2017-08-25 PROCEDURE — 25010000002 ONDANSETRON PER 1 MG: Performed by: ANESTHESIOLOGY

## 2017-08-25 PROCEDURE — 25010000002 FENTANYL CITRATE (PF) 100 MCG/2ML SOLUTION: Performed by: NURSE ANESTHETIST, CERTIFIED REGISTERED

## 2017-08-25 PROCEDURE — 52441 CYSTO INSJ TRNSPRSTC 1 IMPLT: CPT | Performed by: UROLOGY

## 2017-08-25 PROCEDURE — 63710000001 INSULIN REGULAR HUMAN PER 5 UNITS: Performed by: ANESTHESIOLOGY

## 2017-08-25 PROCEDURE — 25010000002 MORPHINE SULFATE (PF) 2 MG/ML SOLUTION: Performed by: ANESTHESIOLOGY

## 2017-08-25 PROCEDURE — 25010000002 PROPOFOL 10 MG/ML EMULSION: Performed by: NURSE ANESTHETIST, CERTIFIED REGISTERED

## 2017-08-25 PROCEDURE — S0260 H&P FOR SURGERY: HCPCS | Performed by: UROLOGY

## 2017-08-25 PROCEDURE — 82962 GLUCOSE BLOOD TEST: CPT

## 2017-08-25 PROCEDURE — 25010000002 SUCCINYLCHOLINE PER 20 MG: Performed by: NURSE ANESTHETIST, CERTIFIED REGISTERED

## 2017-08-25 PROCEDURE — L8699 PROSTHETIC IMPLANT NOS: HCPCS | Performed by: UROLOGY

## 2017-08-25 DEVICE — SYS UROLIFT PROSTATIC RETR: Type: IMPLANTABLE DEVICE | Site: URETHRA | Status: FUNCTIONAL

## 2017-08-25 RX ORDER — HYDRALAZINE HYDROCHLORIDE 20 MG/ML
5 INJECTION INTRAMUSCULAR; INTRAVENOUS
Status: DISCONTINUED | OUTPATIENT
Start: 2017-08-25 | End: 2017-08-25 | Stop reason: HOSPADM

## 2017-08-25 RX ORDER — IPRATROPIUM BROMIDE AND ALBUTEROL SULFATE 2.5; .5 MG/3ML; MG/3ML
3 SOLUTION RESPIRATORY (INHALATION) ONCE
Status: COMPLETED | OUTPATIENT
Start: 2017-08-25 | End: 2017-08-25

## 2017-08-25 RX ORDER — ONDANSETRON 2 MG/ML
4 INJECTION INTRAMUSCULAR; INTRAVENOUS AS NEEDED
Status: DISCONTINUED | OUTPATIENT
Start: 2017-08-25 | End: 2017-08-25 | Stop reason: HOSPADM

## 2017-08-25 RX ORDER — ONDANSETRON 2 MG/ML
4 INJECTION INTRAMUSCULAR; INTRAVENOUS ONCE AS NEEDED
Status: COMPLETED | OUTPATIENT
Start: 2017-08-25 | End: 2017-08-25

## 2017-08-25 RX ORDER — OXYCODONE HYDROCHLORIDE AND ACETAMINOPHEN 5; 325 MG/1; MG/1
1 TABLET ORAL EVERY 4 HOURS PRN
Qty: 18 TABLET | Refills: 0 | Status: SHIPPED | OUTPATIENT
Start: 2017-08-25 | End: 2017-10-05 | Stop reason: ALTCHOICE

## 2017-08-25 RX ORDER — SULFAMETHOXAZOLE AND TRIMETHOPRIM 800; 160 MG/1; MG/1
1 TABLET ORAL 2 TIMES DAILY
Qty: 6 TABLET | Refills: 0 | Status: SHIPPED | OUTPATIENT
Start: 2017-08-25 | End: 2017-08-28

## 2017-08-25 RX ORDER — LABETALOL HYDROCHLORIDE 5 MG/ML
5 INJECTION, SOLUTION INTRAVENOUS
Status: DISCONTINUED | OUTPATIENT
Start: 2017-08-25 | End: 2017-08-25 | Stop reason: HOSPADM

## 2017-08-25 RX ORDER — MIDAZOLAM HYDROCHLORIDE 1 MG/ML
1 INJECTION INTRAMUSCULAR; INTRAVENOUS
Status: DISCONTINUED | OUTPATIENT
Start: 2017-08-25 | End: 2017-08-25 | Stop reason: HOSPADM

## 2017-08-25 RX ORDER — MEPERIDINE HYDROCHLORIDE 25 MG/ML
12.5 INJECTION INTRAMUSCULAR; INTRAVENOUS; SUBCUTANEOUS
Status: DISCONTINUED | OUTPATIENT
Start: 2017-08-25 | End: 2017-08-25 | Stop reason: HOSPADM

## 2017-08-25 RX ORDER — METOCLOPRAMIDE HYDROCHLORIDE 5 MG/ML
5 INJECTION INTRAMUSCULAR; INTRAVENOUS
Status: DISCONTINUED | OUTPATIENT
Start: 2017-08-25 | End: 2017-08-25 | Stop reason: HOSPADM

## 2017-08-25 RX ORDER — ROCURONIUM BROMIDE 10 MG/ML
INJECTION, SOLUTION INTRAVENOUS AS NEEDED
Status: DISCONTINUED | OUTPATIENT
Start: 2017-08-25 | End: 2017-08-25 | Stop reason: SURG

## 2017-08-25 RX ORDER — METOCLOPRAMIDE HYDROCHLORIDE 5 MG/ML
10 INJECTION INTRAMUSCULAR; INTRAVENOUS ONCE AS NEEDED
Status: COMPLETED | OUTPATIENT
Start: 2017-08-25 | End: 2017-08-25

## 2017-08-25 RX ORDER — SODIUM CHLORIDE, SODIUM LACTATE, POTASSIUM CHLORIDE, CALCIUM CHLORIDE 600; 310; 30; 20 MG/100ML; MG/100ML; MG/100ML; MG/100ML
1000 INJECTION, SOLUTION INTRAVENOUS CONTINUOUS PRN
Status: DISCONTINUED | OUTPATIENT
Start: 2017-08-25 | End: 2017-08-25 | Stop reason: HOSPADM

## 2017-08-25 RX ORDER — SODIUM CHLORIDE 0.9 % (FLUSH) 0.9 %
1-10 SYRINGE (ML) INJECTION AS NEEDED
Status: DISCONTINUED | OUTPATIENT
Start: 2017-08-25 | End: 2017-08-25 | Stop reason: HOSPADM

## 2017-08-25 RX ORDER — ONDANSETRON 2 MG/ML
4 INJECTION INTRAMUSCULAR; INTRAVENOUS ONCE AS NEEDED
Status: DISCONTINUED | OUTPATIENT
Start: 2017-08-25 | End: 2017-08-25 | Stop reason: HOSPADM

## 2017-08-25 RX ORDER — OXYCODONE HYDROCHLORIDE AND ACETAMINOPHEN 5; 325 MG/1; MG/1
1 TABLET ORAL ONCE AS NEEDED
Status: DISCONTINUED | OUTPATIENT
Start: 2017-08-25 | End: 2017-08-25 | Stop reason: HOSPADM

## 2017-08-25 RX ORDER — LIDOCAINE HYDROCHLORIDE 10 MG/ML
0.5 INJECTION, SOLUTION INFILTRATION; PERINEURAL ONCE AS NEEDED
Status: DISCONTINUED | OUTPATIENT
Start: 2017-08-25 | End: 2017-08-25 | Stop reason: RX

## 2017-08-25 RX ORDER — NALOXONE HCL 0.4 MG/ML
0.04 VIAL (ML) INJECTION AS NEEDED
Status: DISCONTINUED | OUTPATIENT
Start: 2017-08-25 | End: 2017-08-25 | Stop reason: HOSPADM

## 2017-08-25 RX ORDER — SODIUM CHLORIDE, SODIUM LACTATE, POTASSIUM CHLORIDE, CALCIUM CHLORIDE 600; 310; 30; 20 MG/100ML; MG/100ML; MG/100ML; MG/100ML
100 INJECTION, SOLUTION INTRAVENOUS CONTINUOUS
Status: DISCONTINUED | OUTPATIENT
Start: 2017-08-25 | End: 2017-08-25 | Stop reason: HOSPADM

## 2017-08-25 RX ORDER — MAGNESIUM HYDROXIDE 1200 MG/15ML
LIQUID ORAL AS NEEDED
Status: DISCONTINUED | OUTPATIENT
Start: 2017-08-25 | End: 2017-08-25 | Stop reason: HOSPADM

## 2017-08-25 RX ORDER — MIDAZOLAM HYDROCHLORIDE 1 MG/ML
2 INJECTION INTRAMUSCULAR; INTRAVENOUS
Status: DISCONTINUED | OUTPATIENT
Start: 2017-08-25 | End: 2017-08-25 | Stop reason: HOSPADM

## 2017-08-25 RX ORDER — IPRATROPIUM BROMIDE AND ALBUTEROL SULFATE 2.5; .5 MG/3ML; MG/3ML
3 SOLUTION RESPIRATORY (INHALATION) ONCE AS NEEDED
Status: DISCONTINUED | OUTPATIENT
Start: 2017-08-25 | End: 2017-08-25 | Stop reason: HOSPADM

## 2017-08-25 RX ORDER — FENTANYL CITRATE 50 UG/ML
INJECTION, SOLUTION INTRAMUSCULAR; INTRAVENOUS AS NEEDED
Status: DISCONTINUED | OUTPATIENT
Start: 2017-08-25 | End: 2017-08-25 | Stop reason: SURG

## 2017-08-25 RX ORDER — FLUMAZENIL 0.1 MG/ML
0.2 INJECTION INTRAVENOUS AS NEEDED
Status: DISCONTINUED | OUTPATIENT
Start: 2017-08-25 | End: 2017-08-25 | Stop reason: HOSPADM

## 2017-08-25 RX ORDER — SUCCINYLCHOLINE CHLORIDE 20 MG/ML
INJECTION INTRAMUSCULAR; INTRAVENOUS AS NEEDED
Status: DISCONTINUED | OUTPATIENT
Start: 2017-08-25 | End: 2017-08-25 | Stop reason: SURG

## 2017-08-25 RX ORDER — PROPOFOL 10 MG/ML
VIAL (ML) INTRAVENOUS AS NEEDED
Status: DISCONTINUED | OUTPATIENT
Start: 2017-08-25 | End: 2017-08-25 | Stop reason: SURG

## 2017-08-25 RX ORDER — FAMOTIDINE 10 MG/ML
20 INJECTION, SOLUTION INTRAVENOUS
Status: DISCONTINUED | OUTPATIENT
Start: 2017-08-25 | End: 2017-08-25 | Stop reason: HOSPADM

## 2017-08-25 RX ORDER — PHENAZOPYRIDINE HYDROCHLORIDE 100 MG/1
100 TABLET, FILM COATED ORAL 3 TIMES DAILY PRN
Qty: 12 TABLET | Refills: 0 | Status: SHIPPED | OUTPATIENT
Start: 2017-08-25 | End: 2017-11-28

## 2017-08-25 RX ORDER — MORPHINE SULFATE 2 MG/ML
2 INJECTION, SOLUTION INTRAMUSCULAR; INTRAVENOUS AS NEEDED
Status: DISCONTINUED | OUTPATIENT
Start: 2017-08-25 | End: 2017-08-25 | Stop reason: HOSPADM

## 2017-08-25 RX ORDER — LIDOCAINE HYDROCHLORIDE 20 MG/ML
INJECTION, SOLUTION INFILTRATION; PERINEURAL AS NEEDED
Status: DISCONTINUED | OUTPATIENT
Start: 2017-08-25 | End: 2017-08-25 | Stop reason: SURG

## 2017-08-25 RX ADMIN — LIDOCAINE HYDROCHLORIDE 0.5 ML: 10 INJECTION, SOLUTION INFILTRATION; PERINEURAL at 07:25

## 2017-08-25 RX ADMIN — PROPOFOL 150 MG: 10 INJECTION, EMULSION INTRAVENOUS at 08:16

## 2017-08-25 RX ADMIN — PROPOFOL 50 MG: 10 INJECTION, EMULSION INTRAVENOUS at 08:18

## 2017-08-25 RX ADMIN — FENTANYL CITRATE 50 MCG: 50 INJECTION, SOLUTION INTRAMUSCULAR; INTRAVENOUS at 08:25

## 2017-08-25 RX ADMIN — SUCCINYLCHOLINE CHLORIDE 80 MG: 20 INJECTION, SOLUTION INTRAMUSCULAR; INTRAVENOUS at 08:18

## 2017-08-25 RX ADMIN — FENTANYL CITRATE 50 MCG: 50 INJECTION, SOLUTION INTRAMUSCULAR; INTRAVENOUS at 08:14

## 2017-08-25 RX ADMIN — ONDANSETRON 4 MG: 2 SOLUTION INTRAMUSCULAR; INTRAVENOUS at 08:07

## 2017-08-25 RX ADMIN — METOCLOPRAMIDE 10 MG: 5 INJECTION, SOLUTION INTRAMUSCULAR; INTRAVENOUS at 08:07

## 2017-08-25 RX ADMIN — IPRATROPIUM BROMIDE AND ALBUTEROL SULFATE 3 ML: .5; 3 SOLUTION RESPIRATORY (INHALATION) at 08:07

## 2017-08-25 RX ADMIN — MORPHINE SULFATE 2 MG: 2 INJECTION, SOLUTION INTRAMUSCULAR; INTRAVENOUS at 09:06

## 2017-08-25 RX ADMIN — ROCURONIUM BROMIDE 10 MG: 10 INJECTION INTRAVENOUS at 08:15

## 2017-08-25 RX ADMIN — SODIUM CHLORIDE, SODIUM LACTATE, POTASSIUM CHLORIDE, AND CALCIUM CHLORIDE 1000 ML: .6; .31; .03; .02 INJECTION, SOLUTION INTRAVENOUS at 07:25

## 2017-08-25 RX ADMIN — CEFAZOLIN SODIUM 2 G: 2 SOLUTION INTRAVENOUS at 08:23

## 2017-08-25 RX ADMIN — FAMOTIDINE 20 MG: 10 INJECTION, SOLUTION INTRAVENOUS at 08:07

## 2017-08-25 RX ADMIN — SUCCINYLCHOLINE CHLORIDE 120 MG: 20 INJECTION, SOLUTION INTRAMUSCULAR; INTRAVENOUS at 08:16

## 2017-08-25 RX ADMIN — LIDOCAINE HYDROCHLORIDE 50 MG: 20 INJECTION, SOLUTION INFILTRATION; PERINEURAL at 08:44

## 2017-08-25 RX ADMIN — INSULIN HUMAN 8 UNITS: 100 INJECTION, SOLUTION PARENTERAL at 08:07

## 2017-08-25 RX ADMIN — LIDOCAINE HYDROCHLORIDE 50 MG: 20 INJECTION, SOLUTION INFILTRATION; PERINEURAL at 08:16

## 2017-08-25 NOTE — ANESTHESIA PROCEDURE NOTES
Airway  Date/Time: 8/25/2017 8:19 AM  End Time:8/25/2017 8:23 AM  Difficult airway (small opening, large amount of adipose tissue on anterior neck, airway)    General Information and Staff    CRNA: CHARLENE BUSTAMANTE    Indications and Patient Condition  Indications for airway management: airway protection    Preoxygenated: yes  Mask difficulty assessment: 3 - difficult mask (inadequate, unstable or two providers) +/- NMBA    Final Airway Details  Final airway type: endotracheal airway      Successful airway: ETT  Cuffed: yes   Successful intubation technique: video laryngoscopy  Facilitating devices/methods: intubating stylet  Endotracheal tube insertion site: oral  ETT size: 7.5 mm  Cormack-Lehane Classification: grade IIb - view of arytenoids or posterior of glottis only  Placement verified by: capnometry   Cuff volume (mL): 7  Measured from: lips  ETT to lips (cm): 22  Number of attempts at approach: 2

## 2017-08-25 NOTE — ANESTHESIA POSTPROCEDURE EVALUATION
Patient: Carlos A Boyer Jr.    Procedure Summary     Date Anesthesia Start Anesthesia Stop Room / Location    08/25/17 0811 0853  [facility] OR 01 / [facility]       Procedure Diagnosis Surgeon Provider    PROSTATIC URETHRAL LIFT (N/A Urethra) Benign non-nodular prostatic hyperplasia with lower urinary tract symptoms  (Benign non-nodular prostatic hyperplasia with lower urinary tract symptoms [N40.1]) MD Delio Rodriguez CRNA          Anesthesia Type: general  Last vitals  BP   131/85 (08/25/17 0945)    Temp   97.7 °F (36.5 °C) (08/25/17 0915)    Pulse   68 (08/25/17 0945)   Resp   18 (08/25/17 0945)    SpO2   99 % (08/25/17 0945)      Post Anesthesia Care and Evaluation      Comments: Patient discharged from PACU prior to anesthesia evaluation based on Shayla Score.  For details, see RN note.     /85  Pulse 68  Temp 97.7 °F (36.5 °C) (Temporal Artery )   Resp 18  SpO2 99%

## 2017-09-02 ENCOUNTER — HOSPITAL ENCOUNTER (EMERGENCY)
Facility: HOSPITAL | Age: 57
Discharge: HOME | End: 2017-09-02
Attending: EMERGENCY MEDICINE | Admitting: EMERGENCY MEDICINE

## 2017-09-02 ENCOUNTER — APPOINTMENT (OUTPATIENT)
Dept: GENERAL RADIOLOGY | Facility: HOSPITAL | Age: 57
End: 2017-09-02

## 2017-09-02 VITALS
HEIGHT: 72 IN | BODY MASS INDEX: 33.86 KG/M2 | HEART RATE: 64 BPM | OXYGEN SATURATION: 99 % | DIASTOLIC BLOOD PRESSURE: 92 MMHG | SYSTOLIC BLOOD PRESSURE: 141 MMHG | RESPIRATION RATE: 14 BRPM | WEIGHT: 250 LBS | TEMPERATURE: 98.6 F

## 2017-09-02 DIAGNOSIS — R11.0 NAUSEA: ICD-10-CM

## 2017-09-02 DIAGNOSIS — E11.9 TYPE 2 DIABETES MELLITUS WITHOUT COMPLICATION, UNSPECIFIED LONG TERM INSULIN USE STATUS: Primary | ICD-10-CM

## 2017-09-02 DIAGNOSIS — R42 DIZZINESS: ICD-10-CM

## 2017-09-02 LAB
ALBUMIN SERPL-MCNC: 4.7 G/DL (ref 3.5–5)
ALBUMIN/GLOB SERPL: 1.6 G/DL (ref 1.1–2.5)
ALP SERPL-CCNC: 102 U/L (ref 24–120)
ALT SERPL W P-5'-P-CCNC: 61 U/L (ref 0–54)
AMYLASE SERPL-CCNC: 69 U/L (ref 30–110)
ANION GAP SERPL CALCULATED.3IONS-SCNC: 14 MMOL/L (ref 4–13)
AST SERPL-CCNC: 38 U/L (ref 7–45)
BASOPHILS # BLD AUTO: 0.01 10*3/MM3 (ref 0–0.2)
BASOPHILS NFR BLD AUTO: 0.2 % (ref 0–2)
BILIRUB SERPL-MCNC: 0.9 MG/DL (ref 0.1–1)
BILIRUB UR QL STRIP: NEGATIVE
BUN BLD-MCNC: 26 MG/DL (ref 5–21)
BUN/CREAT SERPL: 25.5 (ref 7–25)
CALCIUM SPEC-SCNC: 10.5 MG/DL (ref 8.4–10.4)
CHLORIDE SERPL-SCNC: 101 MMOL/L (ref 98–110)
CLARITY UR: CLEAR
CO2 SERPL-SCNC: 21 MMOL/L (ref 24–31)
COLOR UR: YELLOW
CREAT BLD-MCNC: 1.02 MG/DL (ref 0.5–1.4)
DEPRECATED RDW RBC AUTO: 42.4 FL (ref 40–54)
EOSINOPHIL # BLD AUTO: 0.19 10*3/MM3 (ref 0–0.7)
EOSINOPHIL NFR BLD AUTO: 3.7 % (ref 0–4)
ERYTHROCYTE [DISTWIDTH] IN BLOOD BY AUTOMATED COUNT: 13.1 % (ref 12–15)
GFR SERPL CREATININE-BSD FRML MDRD: 75 ML/MIN/1.73
GLOBULIN UR ELPH-MCNC: 3 GM/DL
GLUCOSE BLD-MCNC: 215 MG/DL (ref 70–100)
GLUCOSE BLDC GLUCOMTR-MCNC: 254 MG/DL (ref 70–130)
GLUCOSE UR STRIP-MCNC: ABNORMAL MG/DL
HCT VFR BLD AUTO: 39.7 % (ref 40–52)
HGB BLD-MCNC: 13.7 G/DL (ref 14–18)
HGB UR QL STRIP.AUTO: NEGATIVE
IMM GRANULOCYTES # BLD: 0.02 10*3/MM3 (ref 0–0.03)
IMM GRANULOCYTES NFR BLD: 0.4 % (ref 0–5)
KETONES UR QL STRIP: NEGATIVE
LEUKOCYTE ESTERASE UR QL STRIP.AUTO: NEGATIVE
LIPASE SERPL-CCNC: 51 U/L (ref 23–203)
LYMPHOCYTES # BLD AUTO: 1.88 10*3/MM3 (ref 0.72–4.86)
LYMPHOCYTES NFR BLD AUTO: 36.3 % (ref 15–45)
MAGNESIUM SERPL-MCNC: 1.8 MG/DL (ref 1.4–2.2)
MCH RBC QN AUTO: 30.8 PG (ref 28–32)
MCHC RBC AUTO-ENTMCNC: 34.5 G/DL (ref 33–36)
MCV RBC AUTO: 89.2 FL (ref 82–95)
MONOCYTES # BLD AUTO: 0.46 10*3/MM3 (ref 0.19–1.3)
MONOCYTES NFR BLD AUTO: 8.9 % (ref 4–12)
NEUTROPHILS # BLD AUTO: 2.62 10*3/MM3 (ref 1.87–8.4)
NEUTROPHILS NFR BLD AUTO: 50.5 % (ref 39–78)
NITRITE UR QL STRIP: NEGATIVE
NT-PROBNP SERPL-MCNC: 56.3 PG/ML (ref 0–900)
PH UR STRIP.AUTO: 5.5 [PH] (ref 5–8)
PLATELET # BLD AUTO: 116 10*3/MM3 (ref 130–400)
PMV BLD AUTO: 11.9 FL (ref 6–12)
POTASSIUM BLD-SCNC: 4.6 MMOL/L (ref 3.5–5.3)
PROT SERPL-MCNC: 7.7 G/DL (ref 6.3–8.7)
PROT UR QL STRIP: NEGATIVE
RBC # BLD AUTO: 4.45 10*6/MM3 (ref 4.8–5.9)
SODIUM BLD-SCNC: 136 MMOL/L (ref 135–145)
SP GR UR STRIP: 1.03 (ref 1–1.03)
TROPONIN I SERPL-MCNC: <0.012 NG/ML (ref 0–0.03)
UROBILINOGEN UR QL STRIP: ABNORMAL
WBC NRBC COR # BLD: 5.18 10*3/MM3 (ref 4.8–10.8)

## 2017-09-02 PROCEDURE — 71020 HC CHEST PA AND LATERAL: CPT

## 2017-09-02 PROCEDURE — 74020 HC XR ABDOMEN FLAT & UPRIGHT: CPT

## 2017-09-02 PROCEDURE — 93010 ELECTROCARDIOGRAM REPORT: CPT | Performed by: INTERNAL MEDICINE

## 2017-09-02 PROCEDURE — 80053 COMPREHEN METABOLIC PANEL: CPT | Performed by: EMERGENCY MEDICINE

## 2017-09-02 PROCEDURE — 82150 ASSAY OF AMYLASE: CPT | Performed by: EMERGENCY MEDICINE

## 2017-09-02 PROCEDURE — 93005 ELECTROCARDIOGRAM TRACING: CPT | Performed by: EMERGENCY MEDICINE

## 2017-09-02 PROCEDURE — 82962 GLUCOSE BLOOD TEST: CPT

## 2017-09-02 PROCEDURE — 83690 ASSAY OF LIPASE: CPT | Performed by: EMERGENCY MEDICINE

## 2017-09-02 PROCEDURE — 85025 COMPLETE CBC W/AUTO DIFF WBC: CPT | Performed by: EMERGENCY MEDICINE

## 2017-09-02 PROCEDURE — 99283 EMERGENCY DEPT VISIT LOW MDM: CPT

## 2017-09-02 PROCEDURE — 81003 URINALYSIS AUTO W/O SCOPE: CPT | Performed by: EMERGENCY MEDICINE

## 2017-09-02 PROCEDURE — 83735 ASSAY OF MAGNESIUM: CPT | Performed by: EMERGENCY MEDICINE

## 2017-09-02 PROCEDURE — 83880 ASSAY OF NATRIURETIC PEPTIDE: CPT | Performed by: EMERGENCY MEDICINE

## 2017-09-02 PROCEDURE — 84484 ASSAY OF TROPONIN QUANT: CPT | Performed by: EMERGENCY MEDICINE

## 2017-09-02 NOTE — ED PROVIDER NOTES
Subjective   HPI Comments: Patient is a 57-year-old male with history of type II diabetes mellitus who reports that he had prostate surgery by Dr. Badillo on 08/25/2017.  He reports on Tuesday (08/29/2017) he had right lower abdominal pain that has improved since then.  He reports yesterday (at between 10-11 AM) his blood glucose was up to 450 and he had onset of dizziness, sweating and diarrhea.  He reports yesterday he checked his blood pressure and it was 97/64.  He reports today at 6:30 AM his blood sugar was 471.  He reports he is having nausea today.  Patient reports he has finished his third round of antibiotics for treatment of sinusitis.      History provided by:  Patient      Review of Systems   Constitutional: Positive for diaphoresis.        Hyperglycemia   HENT: Positive for sinus pressure (Chronic).    Eyes: Negative.    Respiratory: Positive for cough (Productive for white phlegm x 6-7 weeks.) and shortness of breath (Chronic).    Cardiovascular: Negative.    Gastrointestinal: Positive for abdominal pain, diarrhea (yesterday) and nausea. Negative for vomiting.   Endocrine: Negative.    Genitourinary: Negative.    Musculoskeletal: Negative.    Skin: Negative.    Allergic/Immunologic: Negative.    Neurological: Positive for dizziness.   Hematological: Negative.    Psychiatric/Behavioral: Negative.    All other systems reviewed and are negative.      Past Medical History:   Diagnosis Date   • Arthritis    • Asthma    • Carotid disease, bilateral    • Coronary artery disease    • Diabetes mellitus    • Difficulty urinating    • Diverticulitis    • Enlarged prostate    • GERD (gastroesophageal reflux disease)    • Hyperlipidemia    • Hypertension    • Kidney stone    • Murmur, heart    • Myocardial infarction    • PONV (postoperative nausea and vomiting)    • Psoriasis    • Seizures    • Sinus congestion    • Sleep apnea    • Stroke        Allergies   Allergen Reactions   • Flagyl [Metronidazole] Hives   •  Atorvastatin Other (See Comments)     Muscle cramps   • Ciprofloxacin Hives       Past Surgical History:   Procedure Laterality Date   • CARDIAC CATHETERIZATION  01/2016    Dr. Broadbent; widely patent previously placed stents in the left anterior descending and obstructive disease involving the diagonal branch which was treated medically   • CARDIAC CATHETERIZATION N/A 7/14/2017    Procedure: Left Heart Cath;  Surgeon: Wade Ramey MD;  Location:  [facility] CATH [facility]VASIVE LOC[facility];  Service:    • CORONARY ANGIOPLASTY     • CORONARY STENT PLACEMENT      Pt states 5 or 6 stents   • HERNIA REPAIR      x2 inguinal area   • KIDNEY STONE SURGERY     • KNEE SURGERY Right    • THUMB AMPUTATION Left     partial   • TOE AMPUTATION Right     big       Family History   Problem Relation Age of Onset   • Heart disease Father    • Heart disease Maternal Grandmother        Social History     Social History   • Marital status:      Spouse name: N/A   • Number of children: N/A   • Years of education: N/A     Social History Main Topics   • Smoking status: Former Smoker     Quit date: 1993   • Smokeless tobacco: Former User     Types: Chew     Quit date: 2009      Comment: Pt quit 25 years ago   • Alcohol use No      Comment: quit 2009   • Drug use: No   • Sexual activity: Defer     Other Topics Concern   • None     Social History Narrative           Objective   Physical Exam   Constitutional: He is oriented to person, place, and time. He appears well-nourished.   Mildly ill-appearing male.   HENT:   Head: Normocephalic and atraumatic.   Right Ear: External ear normal.   Left Ear: External ear normal.   Nose: Nose normal.   Mouth/Throat: Oropharynx is clear and moist.   Eyes: Conjunctivae and EOM are normal. Pupils are equal, round, and reactive to light. Right eye exhibits no discharge. Left eye exhibits no discharge.   Neck: Normal range of motion. Neck supple. No tracheal deviation present. No thyromegaly present.    Cardiovascular: Normal rate, regular rhythm, normal heart sounds and intact distal pulses.    No murmur heard.  Pulmonary/Chest: Effort normal and breath sounds normal. No respiratory distress. He exhibits no tenderness.   Abdominal: Soft. He exhibits no distension. There is tenderness (Mild pain to palpation of the right lower abdominal area (just below the umbilical area).).   Moderately severe obesity   Musculoskeletal: Normal range of motion. He exhibits no edema, tenderness or deformity.   Neurological: He is alert and oriented to person, place, and time. No cranial nerve deficit.   Skin: Skin is warm and dry. No erythema. No pallor.   Psychiatric: He has a normal mood and affect. Judgment normal.   Nursing note and vitals reviewed.      Procedures         ED Course  ED Course   Comment By Time   Indications, risks and benefits of a CT scan of the abdomen and pelvis was discussed with the patient in details.  The patient voiced understanding.  The patient does not want a CT scan of the abdomen and pelvis performed at this emergency department visit. Nick Hoffman MD 09/02 6379                  MDM  Number of Diagnoses or Management Options  Dizziness:   Nausea:   Type 2 diabetes mellitus without complication, unspecified long term insulin use status:      Amount and/or Complexity of Data Reviewed  Clinical lab tests: ordered and reviewed  Tests in the radiology section of CPT®: ordered and reviewed    Risk of Complications, Morbidity, and/or Mortality  Presenting problems: moderate  Diagnostic procedures: moderate  Management options: low    Patient Progress  Patient progress: stable      Final diagnoses:   Type 2 diabetes mellitus without complication, unspecified long term insulin use status   Dizziness   Nausea            Nick Hoffman MD  09/02/17 0341

## 2017-09-06 NOTE — ED NOTES
"ED Call Back Questions    1. How are you doing since leaving the Emergency Department?    Doing a little better   2. Do you have any questions about your discharge instructions? No     3. Have you filled your new prescriptions yet? N/A  a. Do you have any questions about those medications? N/A    4. Were you able to make a follow-up appointment with the physician? Yes     5. Do you have a primary care physician? Yes   a. If No, would you like for me to set you up with one? N/A  i. If Yes, “I will have our ED  give you a call right back at this number to work with you on the best time for an appointment.”    6. We are always looking to get better at what we do. Do you have any suggestions for what we can do to be even better? No   a. If Yes, \"Thank you for sharing your concerns. I apologize. I will follow up with our manager and patient . Would you like someone to call you back?\" No     7. Is there anything else I can do for you? No   Visit went very well     Raffy Mares  09/06/17 1152    "

## 2017-10-05 ENCOUNTER — OFFICE VISIT (OUTPATIENT)
Dept: UROLOGY | Facility: CLINIC | Age: 57
End: 2017-10-05

## 2017-10-05 VITALS — BODY MASS INDEX: 35.49 KG/M2 | WEIGHT: 262 LBS | TEMPERATURE: 98 F | HEIGHT: 72 IN

## 2017-10-05 DIAGNOSIS — N40.1 BENIGN NON-NODULAR PROSTATIC HYPERPLASIA WITH LOWER URINARY TRACT SYMPTOMS: Primary | ICD-10-CM

## 2017-10-05 LAB
BILIRUB BLD-MCNC: NEGATIVE MG/DL
CLARITY, POC: CLEAR
COLOR UR: YELLOW
GLUCOSE UR STRIP-MCNC: ABNORMAL MG/DL
KETONES UR QL: NEGATIVE
LEUKOCYTE EST, POC: NEGATIVE
NITRITE UR-MCNC: NEGATIVE MG/ML
PH UR: 6 [PH] (ref 5–8)
PROT UR STRIP-MCNC: NEGATIVE MG/DL
RBC # UR STRIP: NEGATIVE /UL
SP GR UR: 1.02 (ref 1–1.03)
UROBILINOGEN UR QL: NORMAL

## 2017-10-05 PROCEDURE — 99213 OFFICE O/P EST LOW 20 MIN: CPT | Performed by: UROLOGY

## 2017-10-05 PROCEDURE — 51798 US URINE CAPACITY MEASURE: CPT | Performed by: UROLOGY

## 2017-10-05 PROCEDURE — 81003 URINALYSIS AUTO W/O SCOPE: CPT | Performed by: UROLOGY

## 2017-10-05 NOTE — PROGRESS NOTES
Mr. Boyer is 57 y.o. male    Chief Complaint   Patient presents with   • Benign non-nodular prostatic hyperplasia with lower urinary        Benign Prostatic Hypertrophy   This is a chronic problem. The current episode started more than 1 year ago. The problem has been gradually worsening since onset. Irritative symptoms do not include frequency or urgency. Obstructive symptoms include a weak stream. Obstructive symptoms do not include an intermittent stream or straining. Pertinent negatives include no chills, dysuria or hematuria. AUA score is 8-19 (12). His sexual activity is non-contributory to the current illness. Nothing aggravates the symptoms. Past treatments include tamsulosin and finasteride (Urolift). The treatment provided significant relief. He has been using treatment for 1 to 6 months.       The following portions of the patient's history were reviewed and updated as appropriate: allergies, current medications, past family history, past medical history, past social history, past surgical history and problem list.    Review of Systems   Constitutional: Negative for chills and fever.   Gastrointestinal: Negative for abdominal pain, anal bleeding and blood in stool.   Genitourinary: Negative for dysuria, flank pain, frequency, hematuria and urgency.         Current Outpatient Prescriptions:   •  albuterol (PROVENTIL HFA;VENTOLIN HFA) 108 (90 BASE) MCG/ACT inhaler, Inhale 2 puffs Every 6 (Six) Hours As Needed for wheezing., Disp: , Rfl:   •  carboxymethylcellulose (REFRESH PLUS) 0.5 % solution, 1 drop 4 (Four) Times a Day As Needed for dry eyes., Disp: , Rfl:   •  FLUTICASONE PROPIONATE, NASAL, NA, 50 mcg into each nostril Daily. 2 SPRAYS IN EACH NOSTRIL, Disp: , Rfl:   •  insulin aspart (novoLOG) 100 UNIT/ML injection, Inject 15 Units under the skin 3 (Three) Times a Day Before Meals., Disp: , Rfl:   •  insulin glargine (LANTUS) 100 UNIT/ML injection, Inject 30 Units under the skin 2 (Two) Times a Day.,  Disp: , Rfl:   •  isosorbide dinitrate (ISORDIL) 20 MG tablet, Take 1 tablet by mouth 2 (Two) Times a Day., Disp: 60 tablet, Rfl: 0  •  levETIRAcetam (KEPPRA) 500 MG tablet, Take 3 tablets in the morning and 4 tablets at bedtime (Patient taking differently: 500 mg. Take 3 tablets in the morning and 4 tablets at bedtime ), Disp: 215 tablet, Rfl: 5  •  lisinopril (PRINIVIL,ZESTRIL) 40 MG tablet, Take 40 mg by mouth. Take a 1/2 tab at bedtime by mouth, Disp: , Rfl:   •  metFORMIN (GLUCOPHAGE) 1000 MG tablet, Take 1,000 mg by mouth 2 (Two) Times a Day With Meals., Disp: , Rfl:   •  metoprolol tartrate (LOPRESSOR) 50 MG tablet, Take 1 tablet by mouth 2 (Two) Times a Day., Disp: 60 tablet, Rfl: 0  •  nitroglycerin (NITROSTAT) 0.4 MG SL tablet, Place 0.4 mg under the tongue Every 5 (Five) Minutes As Needed for chest pain. Take no more than 3 doses in 15 minutes., Disp: , Rfl:   •  pantoprazole (PROTONIX) 40 MG EC tablet, Take 40 mg by mouth 2 (Two) Times a Day., Disp: , Rfl:   •  phenazopyridine (PYRIDIUM) 100 MG tablet, Take 1 tablet by mouth 3 (Three) Times a Day As Needed (dysuria)., Disp: 12 tablet, Rfl: 0  •  psyllium (METAMUCIL) 58.6 % packet, Take  by mouth Daily. 1 TABLESPOON, Disp: , Rfl:   •  rosuvastatin (CRESTOR) 40 MG tablet, Take 20 mg by mouth Daily., Disp: , Rfl:   •  tamsulosin (FLOMAX) 0.4 MG capsule 24 hr capsule, Take 1 capsule by mouth Every Night., Disp: , Rfl:   •  triamterene-hydrochlorothiazide (MAXZIDE) 75-50 MG per tablet, Take 1 tablet by mouth Daily., Disp: , Rfl:   •  urea (CARMOL) 40 % ointment, Apply  topically Daily., Disp: , Rfl:     Past Medical History:   Diagnosis Date   • Arthritis    • Asthma    • Carotid disease, bilateral    • Coronary artery disease    • Diabetes mellitus    • Difficulty urinating    • Diverticulitis    • Enlarged prostate    • GERD (gastroesophageal reflux disease)    • Hyperlipidemia    • Hypertension    • Kidney stone    • Murmur, heart    • Myocardial  "infarction    • PONV (postoperative nausea and vomiting)    • Psoriasis    • Seizures    • Sinus congestion    • Sleep apnea    • Stroke        Past Surgical History:   Procedure Laterality Date   • CARDIAC CATHETERIZATION  01/2016    Dr. Broadbent; widely patent previously placed stents in the left anterior descending and obstructive disease involving the diagonal branch which was treated medically   • CARDIAC CATHETERIZATION N/A 7/14/2017    Procedure: Left Heart Cath;  Surgeon: Wade Ramey MD;  Location:  [facility];  Service:    • CORONARY ANGIOPLASTY     • CORONARY STENT PLACEMENT      Pt states 5 or 6 stents   • HERNIA REPAIR      x2 inguinal area   • KIDNEY STONE SURGERY     • KNEE SURGERY Right    • THUMB AMPUTATION Left     partial   • TOE AMPUTATION Right     big       Social History     Social History   • Marital status:      Spouse name: N/A   • Number of children: N/A   • Years of education: N/A     Social History Main Topics   • Smoking status: Former Smoker     Quit date: 1993   • Smokeless tobacco: Former User     Types: Chew     Quit date: 2009      Comment: Pt quit 25 years ago   • Alcohol use No      Comment: quit 2009   • Drug use: No   • Sexual activity: Defer     Other Topics Concern   • None     Social History Narrative       Family History   Problem Relation Age of Onset   • Heart disease Father    • Heart disease Maternal Grandmother        Objective    Temp 98 °F (36.7 °C)  Ht 72\" (182.9 cm)  Wt 262 lb (119 kg)  BMI 35.53 kg/m2    Physical Exam    Admission on 09/02/2017, Discharged on 09/02/2017   Component Date Value Ref Range Status   • Glucose 09/02/2017 254* 70 - 130 mg/dL Final    : [id number] Mayur Howe (Kee)aMeter ID: [id number]   • Glucose 09/02/2017 215* 70 - 100 mg/dL Final   • BUN 09/02/2017 26* 5 - 21 mg/dL Final   • Creatinine 09/02/2017 1.02  0.50 - 1.40 mg/dL Final   • Sodium 09/02/2017 136  135 - 145 mmol/L Final   • Potassium 09/02/2017 " 4.6  3.5 - 5.3 mmol/L Final   • Chloride 09/02/2017 101  98 - 110 mmol/L Final   • CO2 09/02/2017 21.0* 24.0 - 31.0 mmol/L Final   • Calcium 09/02/2017 10.5* 8.4 - 10.4 mg/dL Final   • Total Protein 09/02/2017 7.7  6.3 - 8.7 g/dL Final   • Albumin 09/02/2017 4.70  3.50 - 5.00 g/dL Final   • ALT (SGPT) 09/02/2017 61* 0 - 54 U/L Final   • AST (SGOT) 09/02/2017 38  7 - 45 U/L Final   • Alkaline Phosphatase 09/02/2017 102  24 - 120 U/L Final   • Total Bilirubin 09/02/2017 0.9  0.1 - 1.0 mg/dL Final   • eGFR Non African Amer 09/02/2017 75  >60 mL/min/1.73 Final   • Globulin 09/02/2017 3.0  gm/dL Final   • A/G Ratio 09/02/2017 1.6  1.1 - 2.5 g/dL Final   • BUN/Creatinine Ratio 09/02/2017 25.5* 7.0 - 25.0 Final   • Anion Gap 09/02/2017 14.0* 4.0 - 13.0 mmol/L Final   • Amylase 09/02/2017 69  30 - 110 U/L Final   • Lipase 09/02/2017 51  23 - 203 U/L Final   • Color, UA 09/02/2017 Yellow  Yellow, Straw Final   • Appearance, UA 09/02/2017 Clear  Clear Final   • pH, UA 09/02/2017 5.5  5.0 - 8.0 Final   • Specific Gravity, UA 09/02/2017 1.028  1.005 - 1.030 Final   • Glucose, UA 09/02/2017 250 mg/dL (1+)* Negative Final   • Ketones, UA 09/02/2017 Negative  Negative Final   • Bilirubin, UA 09/02/2017 Negative  Negative Final   • Blood, UA 09/02/2017 Negative  Negative Final   • Protein, UA 09/02/2017 Negative  Negative Final   • Leuk Esterase, UA 09/02/2017 Negative  Negative Final   • Nitrite, UA 09/02/2017 Negative  Negative Final   • Urobilinogen, UA 09/02/2017 0.2 E.U./dL  0.2 - 1.0 E.U./dL Final   • proBNP 09/02/2017 56.3  0.0 - 900.0 pg/mL Final   • Magnesium 09/02/2017 1.8  1.4 - 2.2 mg/dL Final   • Troponin I 09/02/2017 <0.012  0.000 - 0.034 ng/mL Final   • WBC 09/02/2017 5.18  4.80 - 10.80 10*3/mm3 Final   • RBC 09/02/2017 4.45* 4.80 - 5.90 10*6/mm3 Final   • Hemoglobin 09/02/2017 13.7* 14.0 - 18.0 g/dL Final   • Hematocrit 09/02/2017 39.7* 40.0 - 52.0 % Final   • MCV 09/02/2017 89.2  82.0 - 95.0 fL Final   • MCH  09/02/2017 30.8  28.0 - 32.0 pg Final   • MCHC 09/02/2017 34.5  33.0 - 36.0 g/dL Final   • RDW 09/02/2017 13.1  12.0 - 15.0 % Final   • RDW-SD 09/02/2017 42.4  40.0 - 54.0 fl Final   • MPV 09/02/2017 11.9  6.0 - 12.0 fL Final   • Platelets 09/02/2017 116* 130 - 400 10*3/mm3 Final   • Neutrophil % 09/02/2017 50.5  39.0 - 78.0 % Final   • Lymphocyte % 09/02/2017 36.3  15.0 - 45.0 % Final   • Monocyte % 09/02/2017 8.9  4.0 - 12.0 % Final   • Eosinophil % 09/02/2017 3.7  0.0 - 4.0 % Final   • Basophil % 09/02/2017 0.2  0.0 - 2.0 % Final   • Immature Grans % 09/02/2017 0.4  0.0 - 5.0 % Final   • Neutrophils, Absolute 09/02/2017 2.62  1.87 - 8.40 10*3/mm3 Final   • Lymphocytes, Absolute 09/02/2017 1.88  0.72 - 4.86 10*3/mm3 Final   • Monocytes, Absolute 09/02/2017 0.46  0.19 - 1.30 10*3/mm3 Final   • Eosinophils, Absolute 09/02/2017 0.19  0.00 - 0.70 10*3/mm3 Final   • Basophils, Absolute 09/02/2017 0.01  0.00 - 0.20 10*3/mm3 Final   • Immature Grans, Absolute 09/02/2017 0.02  0.00 - 0.03 10*3/mm3 Final       Results for orders placed or performed during the hospital encounter of 09/02/17   Comprehensive Metabolic Panel   Result Value Ref Range    Glucose 215 (H) 70 - 100 mg/dL    BUN 26 (H) 5 - 21 mg/dL    Creatinine 1.02 0.50 - 1.40 mg/dL    Sodium 136 135 - 145 mmol/L    Potassium 4.6 3.5 - 5.3 mmol/L    Chloride 101 98 - 110 mmol/L    CO2 21.0 (L) 24.0 - 31.0 mmol/L    Calcium 10.5 (H) 8.4 - 10.4 mg/dL    Total Protein 7.7 6.3 - 8.7 g/dL    Albumin 4.70 3.50 - 5.00 g/dL    ALT (SGPT) 61 (H) 0 - 54 U/L    AST (SGOT) 38 7 - 45 U/L    Alkaline Phosphatase 102 24 - 120 U/L    Total Bilirubin 0.9 0.1 - 1.0 mg/dL    eGFR Non African Amer 75 >60 mL/min/1.73    Globulin 3.0 gm/dL    A/G Ratio 1.6 1.1 - 2.5 g/dL    BUN/Creatinine Ratio 25.5 (H) 7.0 - 25.0    Anion Gap 14.0 (H) 4.0 - 13.0 mmol/L   Amylase   Result Value Ref Range    Amylase 69 30 - 110 U/L   Lipase   Result Value Ref Range    Lipase 51 23 - 203 U/L   Urinalysis  With / Culture If Indicated   Result Value Ref Range    Color, UA Yellow Yellow, Straw    Appearance, UA Clear Clear    pH, UA 5.5 5.0 - 8.0    Specific Gravity, UA 1.028 1.005 - 1.030    Glucose,  mg/dL (1+) (A) Negative    Ketones, UA Negative Negative    Bilirubin, UA Negative Negative    Blood, UA Negative Negative    Protein, UA Negative Negative    Leuk Esterase, UA Negative Negative    Nitrite, UA Negative Negative    Urobilinogen, UA 0.2 E.U./dL 0.2 - 1.0 E.U./dL   BNP   Result Value Ref Range    proBNP 56.3 0.0 - 900.0 pg/mL   Magnesium   Result Value Ref Range    Magnesium 1.8 1.4 - 2.2 mg/dL   Troponin   Result Value Ref Range    Troponin I <0.012 0.000 - 0.034 ng/mL   CBC Auto Differential   Result Value Ref Range    WBC 5.18 4.80 - 10.80 10*3/mm3    RBC 4.45 (L) 4.80 - 5.90 10*6/mm3    Hemoglobin 13.7 (L) 14.0 - 18.0 g/dL    Hematocrit 39.7 (L) 40.0 - 52.0 %    MCV 89.2 82.0 - 95.0 fL    MCH 30.8 28.0 - 32.0 pg    MCHC 34.5 33.0 - 36.0 g/dL    RDW 13.1 12.0 - 15.0 %    RDW-SD 42.4 40.0 - 54.0 fl    MPV 11.9 6.0 - 12.0 fL    Platelets 116 (L) 130 - 400 10*3/mm3    Neutrophil % 50.5 39.0 - 78.0 %    Lymphocyte % 36.3 15.0 - 45.0 %    Monocyte % 8.9 4.0 - 12.0 %    Eosinophil % 3.7 0.0 - 4.0 %    Basophil % 0.2 0.0 - 2.0 %    Immature Grans % 0.4 0.0 - 5.0 %    Neutrophils, Absolute 2.62 1.87 - 8.40 10*3/mm3    Lymphocytes, Absolute 1.88 0.72 - 4.86 10*3/mm3    Monocytes, Absolute 0.46 0.19 - 1.30 10*3/mm3    Eosinophils, Absolute 0.19 0.00 - 0.70 10*3/mm3    Basophils, Absolute 0.01 0.00 - 0.20 10*3/mm3    Immature Grans, Absolute 0.02 0.00 - 0.03 10*3/mm3   POC Glucose Fingerstick   Result Value Ref Range    Glucose 254 (H) 70 - 130 mg/dL     Bladder Scan interpretation  Estimation of residual urine via abdominal ultrasound  Residual Urine: 26 ml  Indication: bph  Position: Supine  Examination: Incremental scanning of the suprapubic area using 3 MHz transducer using copious amounts of acoustic gel.    Findings: An anechoic area was demonstrated which represented the bladder, with measurement of residual urine as noted. I inspected this myself. In that the residual urine was stable or insignificant, no treatment will be necessary at this time.         Assessment and Plan    Carlos A was seen today for benign non-nodular prostatic hyperplasia with lower urinary .    Diagnoses and all orders for this visit:    Benign non-nodular prostatic hyperplasia with lower urinary tract symptoms  -     POC Urinalysis Dipstick, Automated    Status post Urolift.  He is doing well.  No pain or urinary issues.  He is emptying his bladder well.  He is overall very happy.  He is still getting up at night, but at this point this is secondary to the lack of use of his BiPAP due to sinus issues.  I think this will improve once his sinus issues are resolved.  I would like him to stop his medications slowly starting with the finasteride and then in 4 weeks stop his Flomax.

## 2017-11-07 ENCOUNTER — OFFICE VISIT (OUTPATIENT)
Dept: OTOLARYNGOLOGY | Facility: CLINIC | Age: 57
End: 2017-11-07

## 2017-11-07 ENCOUNTER — TELEPHONE (OUTPATIENT)
Dept: OTOLARYNGOLOGY | Facility: CLINIC | Age: 57
End: 2017-11-07

## 2017-11-07 VITALS
TEMPERATURE: 97.8 F | DIASTOLIC BLOOD PRESSURE: 97 MMHG | WEIGHT: 256 LBS | HEART RATE: 79 BPM | HEIGHT: 72 IN | BODY MASS INDEX: 34.67 KG/M2 | SYSTOLIC BLOOD PRESSURE: 164 MMHG

## 2017-11-07 DIAGNOSIS — G40.909 SEIZURE DISORDER (HCC): ICD-10-CM

## 2017-11-07 DIAGNOSIS — I77.9 CAROTID DISEASE, BILATERAL (HCC): ICD-10-CM

## 2017-11-07 DIAGNOSIS — I25.119 CORONARY ARTERY DISEASE INVOLVING NATIVE CORONARY ARTERY OF NATIVE HEART WITH ANGINA PECTORIS (HCC): ICD-10-CM

## 2017-11-07 DIAGNOSIS — J34.2 DEVIATED NASAL SEPTUM: Primary | ICD-10-CM

## 2017-11-07 DIAGNOSIS — J32.0 CHRONIC SINUSITIS OF BOTH MAXILLARY SINUSES: ICD-10-CM

## 2017-11-07 DIAGNOSIS — G47.33 OSA ON CPAP: ICD-10-CM

## 2017-11-07 DIAGNOSIS — J34.3 HYPERTROPHY OF BOTH INFERIOR NASAL TURBINATES: ICD-10-CM

## 2017-11-07 DIAGNOSIS — K21.9 GASTROESOPHAGEAL REFLUX DISEASE WITHOUT ESOPHAGITIS: ICD-10-CM

## 2017-11-07 DIAGNOSIS — I10 ESSENTIAL HYPERTENSION: ICD-10-CM

## 2017-11-07 DIAGNOSIS — Z79.4 TYPE 2 DIABETES MELLITUS WITHOUT COMPLICATION, WITH LONG-TERM CURRENT USE OF INSULIN (HCC): ICD-10-CM

## 2017-11-07 DIAGNOSIS — E11.9 TYPE 2 DIABETES MELLITUS WITHOUT COMPLICATION, WITH LONG-TERM CURRENT USE OF INSULIN (HCC): ICD-10-CM

## 2017-11-07 DIAGNOSIS — Z99.89 OSA ON CPAP: ICD-10-CM

## 2017-11-07 DIAGNOSIS — I69.30 CHRONIC LEFT ARTERIAL ISCHEMIC STROKE, ICA (INTERNAL CAROTID ARTERY): ICD-10-CM

## 2017-11-07 DIAGNOSIS — J34.89 CONCHA BULLOSA: ICD-10-CM

## 2017-11-07 PROCEDURE — 99203 OFFICE O/P NEW LOW 30 MIN: CPT | Performed by: PHYSICIAN ASSISTANT

## 2017-11-07 RX ORDER — OXYMETAZOLINE HYDROCHLORIDE 0.05 G/100ML
2 SPRAY NASAL
Status: CANCELLED | OUTPATIENT
Start: 2017-12-14 | End: 2017-12-14

## 2017-11-07 NOTE — PATIENT INSTRUCTIONS
SEPTOPLASTY AND TUBINOPLASTY: A septoplasty and inferior turbinoplasty were recommended. The risks and benefits were explained including but not limited to pain, bleeding, infection, risks of the general anesthesia, continued septal deviation, crusting, congestion and septal perforation. Possibilities of continued preoperative symptoms and the possible need for revision surgery and or medical therapy were discussed. Alternatives were discussed. No guarantees were made or implied. Questions were asked appropriately answered.      FUNCTIONAL ENDOSCOPIC SINUS SURGERY: A functional endoscopic sinus surgery was recommended. The risks and benefits were explained including but not limited to pain, bleeding (with the possible need for nasal packing), infection, risks of the general anesthesia, orbital injury with blurred vision or visual loss, cerebrospinal fluid leak, persistent disease, scarring, synichiae and the possibility for the need of reoperation. Possibilities of additional sinus work or less sinus work depending on the status of the nose at the time of the operation was discussed. Alternatives were discussed. No guarantees were made or implied. Questions were asked appropriately answered.

## 2017-11-07 NOTE — TELEPHONE ENCOUNTER
Patient is having sinus surgery on 12/4/17. I am needing clearance for patient to stop aspirin 7 days prior to surgery.

## 2017-11-07 NOTE — PROGRESS NOTES
YOB: 1960  Location: [facility]  Location Address: [address] [place] [address], [facility], [address] [place] [address]  Location Phone: [phone]    Chief Complaint   Patient presents with   • Sinus Problem       History of Present Illness  Carlos A Boyer Jr. is a 57 y.o. male.  Carlos A Boyer Jr. is here for evaluation of ENT complaints. The patient has had problems with nasal congestion, sinusitis and sinus pressure  The symptoms are not localized to a particular location. The patient has had moderate to severe symptoms. The symptoms have been present for the last several years There have been no identified factors that aggravate the symptoms. The symptoms are improved by no identifiable factors. The patient is on CPAP.       Past Medical History:   Diagnosis Date   • Arthritis    • Asthma    • Carotid disease, bilateral    • Chronic ischemic heart disease    • Chronic sinusitis    • Coronary artery disease    • Deviated septum    • Diabetes mellitus    • Difficulty urinating    • Diverticulitis    • Enlarged prostate    • GERD (gastroesophageal reflux disease)    • Hyperlipidemia    • Hypertension    • Hypertrophy of nasal turbinates    • Keratoderma    • Kidney stone    • Murmur, heart    • Myocardial infarction    • Obesity    • PONV (postoperative nausea and vomiting)    • Psoriasis    • Seizures    • Sinus congestion    • Sleep apnea    • Stroke        Past Surgical History:   Procedure Laterality Date   • CARDIAC CATHETERIZATION  2016    Dr. Broadbent; widely patent previously placed stents in the left anterior descending and obstructive disease involving the diagonal branch which was treated medically   • CARDIAC CATHETERIZATION N/A 2017    Procedure: Left Heart Cath;  Surgeon: Wade Ramey MD;  Location: [facility] INVASIVE LOCATION;  Service:    • CORONARY ANGIOPLASTY     • CORONARY STENT PLACEMENT      Pt states 5 or 6 stents   • HERNIA REPAIR      x2 inguinal area   • KIDNEY STONE SURGERY      • KNEE SURGERY Right    • THUMB AMPUTATION Left     partial   • TOE AMPUTATION Right     big         Current Outpatient Prescriptions:   •  albuterol (PROVENTIL HFA;VENTOLIN HFA) 108 (90 BASE) MCG/ACT inhaler, Inhale 2 puffs Every 6 (Six) Hours As Needed for wheezing., Disp: , Rfl:   •  carboxymethylcellulose (REFRESH PLUS) 0.5 % solution, 1 drop 4 (Four) Times a Day As Needed for dry eyes., Disp: , Rfl:   •  FLUTICASONE PROPIONATE, NASAL, NA, 50 mcg into each nostril Daily. 2 SPRAYS IN EACH NOSTRIL, Disp: , Rfl:   •  insulin aspart (novoLOG) 100 UNIT/ML injection, Inject 15 Units under the skin 3 (Three) Times a Day Before Meals., Disp: , Rfl:   •  insulin glargine (LANTUS) 100 UNIT/ML injection, Inject 35 Units under the skin 2 (Two) Times a Day., Disp: , Rfl:   •  isosorbide dinitrate (ISORDIL) 20 MG tablet, Take 1 tablet by mouth 2 (Two) Times a Day., Disp: 60 tablet, Rfl: 0  •  levETIRAcetam (KEPPRA) 500 MG tablet, Take 3 tablets in the morning and 4 tablets at bedtime (Patient taking differently: 500 mg. Take 3 tablets in the morning and 4 tablets at bedtime ), Disp: 215 tablet, Rfl: 5  •  lisinopril (PRINIVIL,ZESTRIL) 40 MG tablet, Take 40 mg by mouth. Take a 1/2 tab at bedtime by mouth, Disp: , Rfl:   •  metFORMIN (GLUCOPHAGE) 1000 MG tablet, Take 1,000 mg by mouth 2 (Two) Times a Day With Meals., Disp: , Rfl:   •  metoprolol tartrate (LOPRESSOR) 50 MG tablet, Take 1 tablet by mouth 2 (Two) Times a Day., Disp: 60 tablet, Rfl: 0  •  nitroglycerin (NITROSTAT) 0.4 MG SL tablet, Place 0.4 mg under the tongue Every 5 (Five) Minutes As Needed for chest pain. Take no more than 3 doses in 15 minutes., Disp: , Rfl:   •  pantoprazole (PROTONIX) 40 MG EC tablet, Take 40 mg by mouth 2 (Two) Times a Day., Disp: , Rfl:   •  phenazopyridine (PYRIDIUM) 100 MG tablet, Take 1 tablet by mouth 3 (Three) Times a Day As Needed (dysuria)., Disp: 12 tablet, Rfl: 0  •  psyllium (METAMUCIL) 58.6 % packet, Take  by mouth Daily. 1  TABLESPOON, Disp: , Rfl:   •  rosuvastatin (CRESTOR) 40 MG tablet, Take 20 mg by mouth Daily., Disp: , Rfl:   •  tamsulosin (FLOMAX) 0.4 MG capsule 24 hr capsule, Take 1 capsule by mouth Every Night., Disp: , Rfl:   •  triamterene-hydrochlorothiazide (MAXZIDE) 75-50 MG per tablet, Take 1 tablet by mouth Daily., Disp: , Rfl:   •  urea (CARMOL) 40 % ointment, Apply  topically Daily., Disp: , Rfl:     Flagyl [metronidazole]; Atorvastatin; and Ciprofloxacin    Family History   Problem Relation Age of Onset   • Heart disease Father    • Heart disease Maternal Grandmother        Social History     Social History   • Marital status:      Spouse name: N/A   • Number of children: N/A   • Years of education: N/A     Occupational History   • Not on file.     Social History Main Topics   • Smoking status: Former Smoker     Quit date: 1993   • Smokeless tobacco: Former User     Types: Chew     Quit date: 2009      Comment: Pt quit 25 years ago   • Alcohol use No      Comment: quit 2009   • Drug use: No   • Sexual activity: Defer     Other Topics Concern   • Not on file     Social History Narrative       Review of Systems   Constitutional: Negative for activity change, appetite change, chills, diaphoresis, fatigue, fever and unexpected weight change.   HENT: Positive for congestion and sinus pressure. Negative for ear discharge, ear pain, facial swelling, hearing loss, mouth sores, nosebleeds, postnasal drip, rhinorrhea, sneezing, sore throat, tinnitus, trouble swallowing and voice change.    Eyes: Negative for pain, discharge, redness, itching and visual disturbance.   Respiratory: Positive for apnea. Negative for cough, choking, chest tightness, shortness of breath, wheezing and stridor.    Gastrointestinal: Negative for nausea and vomiting.   Endocrine: Negative for cold intolerance and heat intolerance.   Musculoskeletal: Negative for arthralgias, back pain, gait problem, neck pain and neck stiffness.   Skin: Negative  for rash.   Allergic/Immunologic: Negative for environmental allergies and food allergies.   Neurological: Negative for dizziness, tremors, seizures, syncope, facial asymmetry, speech difficulty, weakness, light-headedness, numbness and headaches.   Hematological: Negative for adenopathy. Does not bruise/bleed easily.   Psychiatric/Behavioral: Negative for behavioral problems, sleep disturbance and suicidal ideas. The patient is not nervous/anxious and is not hyperactive.        Vitals:    11/07/17 1019   BP: 164/97   Pulse: 79   Temp: 97.8 °F (36.6 °C)       Objective     Physical Exam  CONSTITUTIONAL: well nourished, alert, oriented, in no acute distress     COMMUNICATION AND VOICE: able to communicate normally, normal voice quality    HEAD: normocephalic, no lesions, atraumatic, no tenderness, no masses     FACE: appearance normal, no lesions, no tenderness, no deformities, facial motion symmetric    SALIVARY GLANDS: parotid glands with no tenderness, no swelling, no masses, submandibular glands with normal size, nontender    EYES: ocular motility normal, eyelids normal, orbits normal, no proptosis, conjunctiva normal , pupils equal, round     EARS:  Hearing: response to conversational voice normal bilaterally   External Ears: auricles without lesions  Otoscopic: tympanic membrane appearance normal, no lesions, no perforation, normal mobility, no fluid    NOSE:  External Nose: structure normal, no tenderness on palpation, no nasal discharge, no lesions, no evidence of trauma, nostrils patent   Intranasal Exam: nasal mucosa edema and inflammaiton, vestibule within normal limits, inferior turbinate hypertrophy, nasal septum with left spur/deviation  Nasopharynx:     ORAL:  Lips: upper and lower lips without lesion   Teeth: dentition within normal limits for age   Gums: gingivae healthy   Oral Mucosa: oral mucosa normal, no mucosal lesions   Floor of Mouth: Warthin’s duct patent, mucosa normal  Tongue: lingual  mucosa normal without lesions, normal tongue mobility   Palate: soft and hard palates with normal mucosa and structure  Oropharynx: oropharyngeal mucosa normal    NECK: neck appearance normal, no mass,  noted without erythema or tenderness    THYROID: no overt thyromegaly, no tenderness, nodules or mass present on palpation, position midline     LYMPH NODES: no lymphadenopathy    CHEST/RESPIRATORY: respiratory effort normal, normal breath sounds     CARDIOVASCULAR: rate and rhythm normal, extremities without cyanosis or edema      NEUROLOGIC/PSYCHIATRIC: oriented to time, place and person, mood normal, affect appropriate, CN II-XII intact grossly    Assessment/Plan   Problems Addressed this Visit        Cardiovascular and Mediastinum    Essential hypertension    Carotid disease, bilateral    Coronary artery disease involving native coronary artery of native heart with angina pectoris    Chronic left arterial ischemic stroke, ICA (internal carotid artery)       Respiratory    HOA on CPAP    Deviated nasal septum - Primary    Relevant Orders    Case Request (Completed)    Comprehensive Metabolic Panel    CBC (No Diff)    Protime-INR    APTT    ECG 12 Lead    XR Chest 2 View    Maribell bullosa    Relevant Orders    Case Request (Completed)    Comprehensive Metabolic Panel    CBC (No Diff)    Protime-INR    APTT    ECG 12 Lead    XR Chest 2 View    Hypertrophy of both inferior nasal turbinates    Relevant Orders    Case Request (Completed)    Comprehensive Metabolic Panel    CBC (No Diff)    Protime-INR    APTT    ECG 12 Lead    XR Chest 2 View    Chronic sinusitis of both maxillary sinuses    Relevant Orders    Case Request (Completed)    Comprehensive Metabolic Panel    CBC (No Diff)    Protime-INR    APTT    ECG 12 Lead    XR Chest 2 View       Digestive    Gastroesophageal reflux disease without esophagitis       Endocrine    Type 2 diabetes mellitus without complication       Nervous and Auditory    Seizure disorder         ENDOSCOPIC FUNCTIONAL SINUS SURGERY W TRACKING, SINUPLASTY, RESECTION OF RIGHT NAVID BULLOSA,  SEPTOPLASTY, RESECT INFERIOR TURBINATES VIA COBLATION (Bilateral)  Orders Placed This Encounter   Procedures   • XR Chest 2 View     Standing Status:   Future     Standing Expiration Date:   11/7/2018     Order Specific Question:   Reason for Exam:     Answer:   pre-op   • Comprehensive Metabolic Panel     Standing Status:   Future     Standing Expiration Date:   11/7/2018   • CBC (No Diff)     Standing Status:   Future     Standing Expiration Date:   11/7/2018   • Protime-INR     Standing Status:   Future     Standing Expiration Date:   11/7/2018   • APTT     Standing Status:   Future     Standing Expiration Date:   11/7/2018   • Follow Anesthesia Guidelines / Standing Orders     Standing Status:   Future   • Obtain Informed Consent     Order Specific Question:   Informed Consent Given For     Answer:   ENDOSCOPIC FUNCTIONAL SINUS SURGERY W TRACKING, SINUPLASTY, RESECTION OF RIGHT NAVID BULLOSA,  SEPTOPLASTY, RESECT INFERIOR TURBINATES VIA COBLATION   • Provide Patient With Instructions on NPO Status   • ECG 12 Lead     Standing Status:   Future     Standing Expiration Date:   11/7/2018     Order Specific Question:   Reason for Exam:     Answer:   preop     Return for Follow-up post-operatively as directed.       Patient Instructions   SEPTOPLASTY AND TUBINOPLASTY: A septoplasty and inferior turbinoplasty were recommended. The risks and benefits were explained including but not limited to pain, bleeding, infection, risks of the general anesthesia, continued septal deviation, crusting, congestion and septal perforation. Possibilities of continued preoperative symptoms and the possible need for revision surgery and or medical therapy were discussed. Alternatives were discussed. No guarantees were made or implied. Questions were asked appropriately answered.      FUNCTIONAL ENDOSCOPIC SINUS SURGERY: A functional  endoscopic sinus surgery was recommended. The risks and benefits were explained including but not limited to pain, bleeding (with the possible need for nasal packing), infection, risks of the general anesthesia, orbital injury with blurred vision or visual loss, cerebrospinal fluid leak, persistent disease, scarring, synichiae and the possibility for the need of reoperation. Possibilities of additional sinus work or less sinus work depending on the status of the nose at the time of the operation was discussed. Alternatives were discussed. No guarantees were made or implied. Questions were asked appropriately answered.

## 2017-11-08 NOTE — TELEPHONE ENCOUNTER
NOTIFIED PATIENT. HE VERBALIZED THAT HE UNDERSTOOD THE DIRECTIONS & REQUEST [facility] ATTN: GINA HUMPHRIES

## 2017-11-08 NOTE — TELEPHONE ENCOUNTER
Dr. Ramey printed a Rx for this with instructions.  Please f/u with the patient.  Nothing for me to do.    HV

## 2017-11-08 NOTE — TELEPHONE ENCOUNTER
Can we bridge with Lovenox then. Patient cannot have sinus surgery on aspirin due to bleeding.Will you call with instructions and send script to pharmacy?

## 2017-11-08 NOTE — TELEPHONE ENCOUNTER
Printed Lovenox 120 mg BID x 1 week  Stop Aspirin and then start Lovenox 120 mg BID x 1 week  Stop 24 hours prior to surgery if OK with surgeon  Start Aspirin ASAP after surgery

## 2017-11-08 NOTE — TELEPHONE ENCOUNTER
Cannot stop Aspirin as multiple coronary stents  If Aspirin has to be stopped than needs Lovenox bridging

## 2017-11-08 NOTE — TELEPHONE ENCOUNTER
THEY WOULD LIKE PT TO STOP ASA - PLEASE PLACE RX FOR LOVENOX IF OKAY & I WILL CC THIS TO KEREN PHILIPPE RN

## 2017-11-20 ENCOUNTER — OFFICE VISIT (OUTPATIENT)
Dept: NEUROLOGY | Facility: CLINIC | Age: 57
End: 2017-11-20

## 2017-11-20 VITALS
WEIGHT: 252 LBS | DIASTOLIC BLOOD PRESSURE: 88 MMHG | BODY MASS INDEX: 34.13 KG/M2 | HEART RATE: 76 BPM | HEIGHT: 72 IN | SYSTOLIC BLOOD PRESSURE: 138 MMHG

## 2017-11-20 DIAGNOSIS — I10 ESSENTIAL HYPERTENSION: ICD-10-CM

## 2017-11-20 DIAGNOSIS — Z79.4 TYPE 2 DIABETES MELLITUS WITHOUT COMPLICATION, WITH LONG-TERM CURRENT USE OF INSULIN (HCC): ICD-10-CM

## 2017-11-20 DIAGNOSIS — I77.9 CAROTID DISEASE, BILATERAL (HCC): ICD-10-CM

## 2017-11-20 DIAGNOSIS — G47.33 OSA ON CPAP: ICD-10-CM

## 2017-11-20 DIAGNOSIS — I69.30 CHRONIC LEFT ARTERIAL ISCHEMIC STROKE, ICA (INTERNAL CAROTID ARTERY): ICD-10-CM

## 2017-11-20 DIAGNOSIS — R90.89 ABNORMAL FINDING ON MRI OF BRAIN: ICD-10-CM

## 2017-11-20 DIAGNOSIS — E11.9 TYPE 2 DIABETES MELLITUS WITHOUT COMPLICATION, WITH LONG-TERM CURRENT USE OF INSULIN (HCC): ICD-10-CM

## 2017-11-20 DIAGNOSIS — Z99.89 OSA ON CPAP: ICD-10-CM

## 2017-11-20 DIAGNOSIS — G40.909 SEIZURE DISORDER (HCC): Primary | ICD-10-CM

## 2017-11-20 PROCEDURE — 99213 OFFICE O/P EST LOW 20 MIN: CPT | Performed by: CLINICAL NURSE SPECIALIST

## 2017-11-20 RX ORDER — LAMOTRIGINE 200 MG/1
200 TABLET ORAL 2 TIMES DAILY
COMMUNITY
End: 2018-03-26 | Stop reason: SDUPTHER

## 2017-11-20 NOTE — PROGRESS NOTES
Subjective     Chief Complaint   Patient presents with   • Seizures     No sz         Carlos A Boyer Jr. is a 57 y.o. male ambidextrous retiree, .  He is here today for follow up for seizures. He was last seen 8/17. He denies seizures, spells, involuntary tremor. He is tolerating Keppra 500 mg 3 in the AM and 4 in the PM and lamictal 200 mg BID.  He has not been able to wear CPAP secondary to  nasal turbinate hypertrophy. He is scheduled for surgery 12/4/17. He has no new complaints today.   He did have MRI done here 5/5/17 that showed hippocampal asymetry and is scheduled for repeat MRI in 12/1/17 for comparison.     As you recall, He is a prior patient of Dr. REJI Roa.  He had an extensive work up at [facility] and found to have seizure disorder. He has been taking Keppra and Lamictal ever since.     Seizures    This is a chronic (2012, would occur 2-4 times a month and last up to 10 mintues) problem. Episode onset: last episode was about April 2017. Duration: last about 5 minutes now with medications. Associated symptoms include headaches. Pertinent negatives include no confusion, no visual disturbance, no chest pain, no nausea, no vomiting and no diarrhea. Characteristics include rhythmic jerking. Characteristics do not include bowel incontinence, bladder incontinence, loss of consciousness, bit tongue or apnea. never had LOC, would have tremoring from inside out, and would have tremor in both arms and rarely in legs. would have staring , can hear Associated symptoms comments: fatigue.        Current Outpatient Prescriptions   Medication Sig Dispense Refill   • albuterol (PROVENTIL HFA;VENTOLIN HFA) 108 (90 BASE) MCG/ACT inhaler Inhale 2 puffs Every 6 (Six) Hours As Needed for wheezing.     • aspirin 81 MG chewable tablet Chew 81 mg Daily.     • carboxymethylcellulose (REFRESH PLUS) 0.5 % solution 1 drop 4 (Four) Times a Day As Needed for dry eyes.     • FLUTICASONE PROPIONATE,  NASAL, NA 50 mcg into each nostril Daily. 2 SPRAYS IN EACH NOSTRIL     • insulin aspart (novoLOG) 100 UNIT/ML injection Inject 25 Units under the skin 3 (Three) Times a Day Before Meals.     • insulin glargine (LANTUS) 100 UNIT/ML injection Inject 35 Units under the skin 2 (Two) Times a Day.     • isosorbide dinitrate (ISORDIL) 20 MG tablet Take 1 tablet by mouth 2 (Two) Times a Day. 60 tablet 0   • lamoTRIgine (LaMICtal) 100 MG tablet Take 100 mg by mouth 2 (Two) Times a Day.     • levETIRAcetam (KEPPRA) 500 MG tablet Take 3 tablets in the morning and 4 tablets at bedtime (Patient taking differently: Take 3,500 mg by mouth Daily. Take 3 tablets in the morning and 4 tablets at bedtime) 215 tablet 5   • lisinopril (PRINIVIL,ZESTRIL) 40 MG tablet Take 40 mg by mouth. Take a 1/2 tab at bedtime by mouth     • metFORMIN (GLUCOPHAGE) 1000 MG tablet Take 1,000 mg by mouth 2 (Two) Times a Day With Meals.     • metoprolol tartrate (LOPRESSOR) 50 MG tablet Take 1 tablet by mouth 2 (Two) Times a Day. 60 tablet 0   • nitroglycerin (NITROSTAT) 0.4 MG SL tablet Place 0.4 mg under the tongue Every 5 (Five) Minutes As Needed for chest pain. Take no more than 3 doses in 15 minutes.     • pantoprazole (PROTONIX) 40 MG EC tablet Take 40 mg by mouth 2 (Two) Times a Day.     • phenazopyridine (PYRIDIUM) 100 MG tablet Take 1 tablet by mouth 3 (Three) Times a Day As Needed (dysuria). 12 tablet 0   • psyllium (METAMUCIL) 58.6 % packet Take  by mouth Daily. 1 TABLESPOON     • rosuvastatin (CRESTOR) 40 MG tablet Take 20 mg by mouth Daily.     • tamsulosin (FLOMAX) 0.4 MG capsule 24 hr capsule Take 1 capsule by mouth Every Night.     • triamterene-hydrochlorothiazide (MAXZIDE) 75-50 MG per tablet Take 1 tablet by mouth Daily.     • urea (CARMOL) 40 % ointment Apply  topically Daily.       No current facility-administered medications for this visit.        Past Medical History:   Diagnosis Date   • Arthritis    • Asthma    • Carotid disease,  bilateral    • Chronic ischemic heart disease    • Chronic sinusitis    • Coronary artery disease    • Deviated septum    • Diabetes mellitus    • Difficulty urinating    • Diverticulitis    • Enlarged prostate    • GERD (gastroesophageal reflux disease)    • Hyperlipidemia    • Hypertension    • Hypertrophy of nasal turbinates    • Keratoderma    • Kidney stone    • Murmur, heart    • Myocardial infarction    • Obesity    • PONV (postoperative nausea and vomiting)    • Psoriasis    • Seizures    • Sinus congestion    • Sleep apnea    • Stroke        Past Surgical History:   Procedure Laterality Date   • CARDIAC CATHETERIZATION  01/2016    Dr. Broadbent; widely patent previously placed stents in the left anterior descending and obstructive disease involving the diagonal branch which was treated medically   • CARDIAC CATHETERIZATION N/A 7/14/2017    Procedure: Left Heart Cath;  Surgeon: Wade Ramey MD;  Location:  [facility] CATH INVASIVE LOCATION;  Service:    • CORONARY ANGIOPLASTY     • CORONARY STENT PLACEMENT      Pt states 5 or 6 stents   • HERNIA REPAIR      x2 inguinal area   • KIDNEY STONE SURGERY     • KNEE SURGERY Right    • THUMB AMPUTATION Left     partial   • TOE AMPUTATION Right     big       family history includes Heart disease in his father and maternal grandmother.    Social History   Substance Use Topics   • Smoking status: Former Smoker     Quit date: 1993   • Smokeless tobacco: Former User     Types: Chew     Quit date: 2009      Comment: Pt quit 25 years ago   • Alcohol use No      Comment: quit 2009       Review of Systems   Constitutional: Negative.  Negative for fatigue and fever.   HENT: Positive for sinus pressure. Negative for hearing loss and postnasal drip.    Eyes: Negative.  Negative for visual disturbance.   Respiratory: Negative.  Negative for apnea, chest tightness and shortness of breath.    Cardiovascular: Negative.  Negative for chest pain.   Gastrointestinal: Negative.  Negative  "for bowel incontinence, constipation, diarrhea, nausea and vomiting.   Endocrine: Negative.    Genitourinary: Negative.  Negative for bladder incontinence, dysuria and frequency.   Musculoskeletal: Positive for back pain. Negative for arthralgias, gait problem and myalgias.   Skin: Negative.    Allergic/Immunologic: Negative.    Neurological: Positive for seizures and headaches. Negative for dizziness, tremors, loss of consciousness and weakness.   Hematological: Negative.  Negative for adenopathy.   Psychiatric/Behavioral: Negative.  Negative for agitation, confusion and hallucinations.   All other systems reviewed and are negative.      Objective     /88  Pulse 76  Ht 72\" (182.9 cm)  Wt 252 lb (114 kg)  BMI 34.18 kg/m2, Body mass index is 34.18 kg/(m^2).    Physical Exam   Constitutional: He is oriented to person, place, and time. He appears well-developed and well-nourished. He is cooperative.   HENT:   Head: Normocephalic and atraumatic.   Right Ear: Hearing and external ear normal.   Left Ear: Hearing and external ear normal.   Nose: Nose normal.   Mouth/Throat: Oropharynx is clear and moist.   Eyes: Conjunctivae, EOM and lids are normal. Pupils are equal, round, and reactive to light.   Neck: Normal range of motion. Neck supple. Carotid bruit is not present.   Cardiovascular: Normal rate, regular rhythm, S1 normal, S2 normal and normal heart sounds.    No murmur heard.  Pulmonary/Chest: Effort normal and breath sounds normal.   Abdominal: Soft. Bowel sounds are normal.   Musculoskeletal: Normal range of motion.   Neurological: He is alert and oriented to person, place, and time. He has normal strength and normal reflexes. He displays no tremor. No cranial nerve deficit or sensory deficit. He exhibits normal muscle tone. He displays a negative Romberg sign. Coordination and gait normal. GCS eye subscore is 4. GCS verbal subscore is 5. GCS motor subscore is 6.   Reflex Scores:       Tricep reflexes are " 2+ on the right side and 2+ on the left side.       Bicep reflexes are 2+ on the right side and 2+ on the left side.       Brachioradialis reflexes are 2+ on the right side and 2+ on the left side.       Patellar reflexes are 2+ on the right side and 2+ on the left side.       Achilles reflexes are 2+ on the right side and 2+ on the left side.  Skin: Skin is warm and dry.   Psychiatric: He has a normal mood and affect. His speech is normal and behavior is normal. Cognition and memory are normal.   Nursing note and vitals reviewed.           ASSESSMENT/PLAN    Diagnoses and all orders for this visit:    Essential hypertension    Chronic left arterial ischemic stroke, ICA (internal carotid artery)    Carotid disease, bilateral    HOA on CPAP    Type 2 diabetes mellitus without complication, with long-term current use of insulin    Seizure disorder    Abnormal finding on MRI of brain-hippocampal assymetry    Other orders  -     lamoTRIgine (LaMICtal) 100 MG tablet; Take 100 mg by mouth 2 (Two) Times a Day.  -     aspirin 81 MG chewable tablet; Chew 81 mg Daily.    MEDICAL DECISION MAKIN. Patient not able tolerate  CPAP at present secondary to  Nasal sinus issues and to resume CPAP when cleared by Dr. Ibarra after upcoming surgery  2. Will compare and repeat MRI brain in 6 months for hippocampal assymetry, 2017. Patient scheduled for 17  3. Continue with Keppra 1500 mg in AM and 2000 mg in PM  4. Continue with lamictal 200 mg BID per PCP  5.  bp managed by PCP  6. Blood glucose managed by PCP and A1C goal less than 7  7. Statin per PCP for LDL goal less than 70.  8. Continue with ASA for secondary stroke prevention  9. Patient is counseled on stroke signs and symptoms using FAST and Time Saved is Brain Saved.  10. Discussed secondary stroke prevention to include a systolic blood pressure goal of less than 140, LDL goal less than 70, and 30-40 minutes of heart rate elevating exercise 3-4 times per week.  Continue antiplatelet.   11.Seizure precautions were discussed to include no tub baths, no swimming, avoiding lack of sleep, and avoiding known triggers. Education given of things that may contribute to a seizure to include, but not limited to: stressful situations, fever, fatigue, lack of sleep, low blood sugar, hyperventilation, flashing lights, and caffeine. Instructions given to take seizure medications as prescribed. Education given to family member on what to do during a seizure and care following the seizure. Education given to contact this office prior to stopping or changing any medications.  12. Former smoker, currently not using tobacco  13.       Patient given printed education with AVS for diet/exerise with AVS and encouraged to include 30 minutes of exercise 3-4 times weekly.     allergies and all known medications/prescriptions have been reviewed using resources available on this encounter.    Return in about 6 months (around 5/20/2018).        Usha Hammer, TEENA

## 2017-11-28 ENCOUNTER — HOSPITAL ENCOUNTER (OUTPATIENT)
Dept: GENERAL RADIOLOGY | Facility: HOSPITAL | Age: 57
Discharge: HOME | End: 2017-11-28
Admitting: PHYSICIAN ASSISTANT

## 2017-11-28 ENCOUNTER — APPOINTMENT (OUTPATIENT)
Dept: PREADMISSION TESTING | Facility: HOSPITAL | Age: 57
End: 2017-11-28

## 2017-11-28 VITALS
OXYGEN SATURATION: 94 % | DIASTOLIC BLOOD PRESSURE: 78 MMHG | BODY MASS INDEX: 34.61 KG/M2 | SYSTOLIC BLOOD PRESSURE: 146 MMHG | HEART RATE: 74 BPM | WEIGHT: 255.5 LBS | HEIGHT: 72 IN

## 2017-11-28 DIAGNOSIS — J34.89 CONCHA BULLOSA: ICD-10-CM

## 2017-11-28 DIAGNOSIS — J34.3 HYPERTROPHY OF BOTH INFERIOR NASAL TURBINATES: ICD-10-CM

## 2017-11-28 DIAGNOSIS — J32.0 CHRONIC SINUSITIS OF BOTH MAXILLARY SINUSES: ICD-10-CM

## 2017-11-28 DIAGNOSIS — J34.2 DEVIATED NASAL SEPTUM: ICD-10-CM

## 2017-11-28 LAB
APTT PPP: 36 SECONDS (ref 24.1–34.8)
DEPRECATED RDW RBC AUTO: 38 FL (ref 40–54)
ERYTHROCYTE [DISTWIDTH] IN BLOOD BY AUTOMATED COUNT: 12.1 % (ref 12–15)
HCT VFR BLD AUTO: 39.7 % (ref 40–52)
HGB BLD-MCNC: 13.8 G/DL (ref 14–18)
INR PPP: 0.9 (ref 0.91–1.09)
MCH RBC QN AUTO: 30.1 PG (ref 28–32)
MCHC RBC AUTO-ENTMCNC: 34.8 G/DL (ref 33–36)
MCV RBC AUTO: 86.5 FL (ref 82–95)
PLATELET # BLD AUTO: 136 10*3/MM3 (ref 130–400)
PMV BLD AUTO: 12.2 FL (ref 6–12)
PROTHROMBIN TIME: 12.4 SECONDS (ref 11.9–14.6)
RBC # BLD AUTO: 4.59 10*6/MM3 (ref 4.8–5.9)
WBC NRBC COR # BLD: 4.98 10*3/MM3 (ref 4.8–10.8)

## 2017-11-28 PROCEDURE — 85730 THROMBOPLASTIN TIME PARTIAL: CPT | Performed by: PHYSICIAN ASSISTANT

## 2017-11-28 PROCEDURE — 36415 COLL VENOUS BLD VENIPUNCTURE: CPT

## 2017-11-28 PROCEDURE — 85610 PROTHROMBIN TIME: CPT | Performed by: PHYSICIAN ASSISTANT

## 2017-11-28 PROCEDURE — 85027 COMPLETE CBC AUTOMATED: CPT | Performed by: PHYSICIAN ASSISTANT

## 2017-11-28 PROCEDURE — 93010 ELECTROCARDIOGRAM REPORT: CPT | Performed by: INTERNAL MEDICINE

## 2017-11-28 PROCEDURE — 93005 ELECTROCARDIOGRAM TRACING: CPT

## 2017-11-28 PROCEDURE — 71020 HC CHEST PA AND LATERAL: CPT

## 2017-11-28 RX ORDER — NAPROXEN SODIUM 220 MG
220 TABLET ORAL DAILY PRN
COMMUNITY
End: 2019-07-10 | Stop reason: HOSPADM

## 2017-12-01 ENCOUNTER — HOSPITAL ENCOUNTER (OUTPATIENT)
Dept: MRI IMAGING | Facility: HOSPITAL | Age: 57
Discharge: HOME | End: 2017-12-01
Admitting: CLINICAL NURSE SPECIALIST

## 2017-12-01 DIAGNOSIS — J32.9 CHRONIC SINUSITIS, UNSPECIFIED LOCATION: Primary | ICD-10-CM

## 2017-12-01 DIAGNOSIS — R93.0 ABNORMAL MRI OF HEAD: ICD-10-CM

## 2017-12-01 DIAGNOSIS — J34.3 HYPERTROPHY OF NASAL TURBINATES: ICD-10-CM

## 2017-12-01 DIAGNOSIS — G40.309 GENERALIZED SEIZURE DISORDER (HCC): ICD-10-CM

## 2017-12-01 DIAGNOSIS — J34.2 DEVIATED SEPTUM: ICD-10-CM

## 2017-12-01 LAB — CREAT BLDA-MCNC: 1 MG/DL (ref 0.6–1.3)

## 2017-12-01 PROCEDURE — A9577 INJ MULTIHANCE: HCPCS | Performed by: CLINICAL NURSE SPECIALIST

## 2017-12-01 PROCEDURE — 0 GADOBENATE DIMEGLUMINE 529 MG/ML SOLUTION: Performed by: CLINICAL NURSE SPECIALIST

## 2017-12-01 PROCEDURE — 70553 MRI BRAIN STEM W/O & W/DYE: CPT

## 2017-12-01 PROCEDURE — 82565 ASSAY OF CREATININE: CPT

## 2017-12-01 RX ADMIN — GADOBENATE DIMEGLUMINE 20 ML: 529 INJECTION, SOLUTION INTRAVENOUS at 10:15

## 2017-12-01 NOTE — PROGRESS NOTES
Patient needs Image Guided CT prior to surgery. We will precert and reschedule surgery that is on 12/4/17.

## 2017-12-13 ENCOUNTER — HOSPITAL ENCOUNTER (OUTPATIENT)
Facility: HOSPITAL | Age: 57
Setting detail: HOSPITAL OUTPATIENT SURGERY
Discharge: HOME | End: 2017-12-13
Attending: OTOLARYNGOLOGY | Admitting: OTOLARYNGOLOGY

## 2017-12-13 ENCOUNTER — ANESTHESIA EVENT (OUTPATIENT)
Dept: PERIOP | Facility: HOSPITAL | Age: 57
End: 2017-12-13

## 2017-12-13 ENCOUNTER — ANESTHESIA (OUTPATIENT)
Dept: PERIOP | Facility: HOSPITAL | Age: 57
End: 2017-12-13

## 2017-12-13 VITALS
TEMPERATURE: 98.1 F | HEART RATE: 58 BPM | DIASTOLIC BLOOD PRESSURE: 71 MMHG | RESPIRATION RATE: 16 BRPM | SYSTOLIC BLOOD PRESSURE: 132 MMHG | OXYGEN SATURATION: 98 %

## 2017-12-13 DIAGNOSIS — J34.89 CONCHA BULLOSA: ICD-10-CM

## 2017-12-13 DIAGNOSIS — J32.0 CHRONIC SINUSITIS OF BOTH MAXILLARY SINUSES: ICD-10-CM

## 2017-12-13 DIAGNOSIS — J34.2 DEVIATED NASAL SEPTUM: ICD-10-CM

## 2017-12-13 DIAGNOSIS — J34.3 HYPERTROPHY OF BOTH INFERIOR NASAL TURBINATES: ICD-10-CM

## 2017-12-13 LAB
ALBUMIN SERPL-MCNC: 4.2 G/DL (ref 3.5–5)
ALBUMIN/GLOB SERPL: 1.4 G/DL (ref 1.1–2.5)
ALP SERPL-CCNC: 90 U/L (ref 24–120)
ALT SERPL W P-5'-P-CCNC: 75 U/L (ref 0–54)
ANION GAP SERPL CALCULATED.3IONS-SCNC: 11 MMOL/L (ref 4–13)
AST SERPL-CCNC: 44 U/L (ref 7–45)
BILIRUB SERPL-MCNC: 0.5 MG/DL (ref 0.1–1)
BUN BLD-MCNC: 15 MG/DL (ref 5–21)
BUN/CREAT SERPL: 15.8 (ref 7–25)
CALCIUM SPEC-SCNC: 9.6 MG/DL (ref 8.4–10.4)
CHLORIDE SERPL-SCNC: 103 MMOL/L (ref 98–110)
CO2 SERPL-SCNC: 23 MMOL/L (ref 24–31)
CREAT BLD-MCNC: 0.95 MG/DL (ref 0.5–1.4)
GFR SERPL CREATININE-BSD FRML MDRD: 81 ML/MIN/1.73
GLOBULIN UR ELPH-MCNC: 3.1 GM/DL
GLUCOSE BLD-MCNC: 202 MG/DL (ref 70–100)
GLUCOSE BLDC GLUCOMTR-MCNC: 169 MG/DL (ref 70–130)
GLUCOSE BLDC GLUCOMTR-MCNC: 202 MG/DL (ref 70–130)
POTASSIUM BLD-SCNC: 4.5 MMOL/L (ref 3.5–5.3)
PROT SERPL-MCNC: 7.3 G/DL (ref 6.3–8.7)
SODIUM BLD-SCNC: 137 MMOL/L (ref 135–145)

## 2017-12-13 PROCEDURE — 30802 ABLATE INF TURBINATE SUBMUC: CPT | Performed by: OTOLARYNGOLOGY

## 2017-12-13 PROCEDURE — C1763 CONN TISS, NON-HUMAN: HCPCS | Performed by: OTOLARYNGOLOGY

## 2017-12-13 PROCEDURE — 80053 COMPREHEN METABOLIC PANEL: CPT | Performed by: PHYSICIAN ASSISTANT

## 2017-12-13 PROCEDURE — 25010000002 NEOSTIGMINE PER 0.5 MG: Performed by: NURSE ANESTHETIST, CERTIFIED REGISTERED

## 2017-12-13 PROCEDURE — 25010000002 METOCLOPRAMIDE PER 10 MG: Performed by: ANESTHESIOLOGY

## 2017-12-13 PROCEDURE — 31256 EXPLORATION MAXILLARY SINUS: CPT | Performed by: OTOLARYNGOLOGY

## 2017-12-13 PROCEDURE — 36415 COLL VENOUS BLD VENIPUNCTURE: CPT

## 2017-12-13 PROCEDURE — 30520 REPAIR OF NASAL SEPTUM: CPT | Performed by: OTOLARYNGOLOGY

## 2017-12-13 PROCEDURE — C2625 STENT, NON-COR, TEM W/DEL SY: HCPCS | Performed by: OTOLARYNGOLOGY

## 2017-12-13 PROCEDURE — 82962 GLUCOSE BLOOD TEST: CPT

## 2017-12-13 PROCEDURE — 25010000002 PROPOFOL 10 MG/ML EMULSION: Performed by: NURSE ANESTHETIST, CERTIFIED REGISTERED

## 2017-12-13 PROCEDURE — 25010000002 SUCCINYLCHOLINE PER 20 MG: Performed by: NURSE ANESTHETIST, CERTIFIED REGISTERED

## 2017-12-13 PROCEDURE — 25010000002 ONDANSETRON PER 1 MG: Performed by: NURSE ANESTHETIST, CERTIFIED REGISTERED

## 2017-12-13 PROCEDURE — 88311 DECALCIFY TISSUE: CPT | Performed by: OTOLARYNGOLOGY

## 2017-12-13 PROCEDURE — 88305 TISSUE EXAM BY PATHOLOGIST: CPT | Performed by: OTOLARYNGOLOGY

## 2017-12-13 PROCEDURE — 25010000002 ONDANSETRON PER 1 MG: Performed by: ANESTHESIOLOGY

## 2017-12-13 PROCEDURE — 25010000002 FENTANYL CITRATE (PF) 250 MCG/5ML SOLUTION: Performed by: NURSE ANESTHETIST, CERTIFIED REGISTERED

## 2017-12-13 PROCEDURE — 31254 NSL/SINS NDSC W/PRTL ETHMDCT: CPT | Performed by: OTOLARYNGOLOGY

## 2017-12-13 PROCEDURE — 25010000002 MIDAZOLAM PER 1 MG: Performed by: ANESTHESIOLOGY

## 2017-12-13 DEVICE — PROPEL SINUS IMPLANT
Type: IMPLANTABLE DEVICE | Status: FUNCTIONAL
Brand: PROPEL

## 2017-12-13 RX ORDER — LIDOCAINE HYDROCHLORIDE AND EPINEPHRINE 10; 10 MG/ML; UG/ML
INJECTION, SOLUTION INFILTRATION; PERINEURAL AS NEEDED
Status: DISCONTINUED | OUTPATIENT
Start: 2017-12-13 | End: 2017-12-13 | Stop reason: HOSPADM

## 2017-12-13 RX ORDER — SODIUM CHLORIDE 0.9 % (FLUSH) 0.9 %
3 SYRINGE (ML) INJECTION AS NEEDED
Status: DISCONTINUED | OUTPATIENT
Start: 2017-12-13 | End: 2017-12-13 | Stop reason: HOSPADM

## 2017-12-13 RX ORDER — GLYCOPYRROLATE 0.2 MG/ML
INJECTION INTRAMUSCULAR; INTRAVENOUS AS NEEDED
Status: DISCONTINUED | OUTPATIENT
Start: 2017-12-13 | End: 2017-12-13 | Stop reason: SURG

## 2017-12-13 RX ORDER — LABETALOL HYDROCHLORIDE 5 MG/ML
5 INJECTION, SOLUTION INTRAVENOUS
Status: DISCONTINUED | OUTPATIENT
Start: 2017-12-13 | End: 2017-12-13 | Stop reason: HOSPADM

## 2017-12-13 RX ORDER — AMOXICILLIN 500 MG/1
500 CAPSULE ORAL 3 TIMES DAILY
Qty: 20 CAPSULE | Refills: 0 | Status: SHIPPED | OUTPATIENT
Start: 2017-12-13 | End: 2017-12-23

## 2017-12-13 RX ORDER — SODIUM CHLORIDE 0.9 % (FLUSH) 0.9 %
1-10 SYRINGE (ML) INJECTION AS NEEDED
Status: DISCONTINUED | OUTPATIENT
Start: 2017-12-13 | End: 2017-12-13 | Stop reason: HOSPADM

## 2017-12-13 RX ORDER — ONDANSETRON 2 MG/ML
INJECTION INTRAMUSCULAR; INTRAVENOUS AS NEEDED
Status: DISCONTINUED | OUTPATIENT
Start: 2017-12-13 | End: 2017-12-13 | Stop reason: SURG

## 2017-12-13 RX ORDER — FENTANYL CITRATE 50 UG/ML
INJECTION, SOLUTION INTRAMUSCULAR; INTRAVENOUS AS NEEDED
Status: DISCONTINUED | OUTPATIENT
Start: 2017-12-13 | End: 2017-12-13 | Stop reason: SURG

## 2017-12-13 RX ORDER — ONDANSETRON 2 MG/ML
4 INJECTION INTRAMUSCULAR; INTRAVENOUS AS NEEDED
Status: DISCONTINUED | OUTPATIENT
Start: 2017-12-13 | End: 2017-12-13 | Stop reason: HOSPADM

## 2017-12-13 RX ORDER — METOCLOPRAMIDE HYDROCHLORIDE 5 MG/ML
5 INJECTION INTRAMUSCULAR; INTRAVENOUS
Status: DISCONTINUED | OUTPATIENT
Start: 2017-12-13 | End: 2017-12-13 | Stop reason: HOSPADM

## 2017-12-13 RX ORDER — HYDROCODONE BITARTRATE AND ACETAMINOPHEN 5; 325 MG/1; MG/1
1 TABLET ORAL ONCE AS NEEDED
Status: DISCONTINUED | OUTPATIENT
Start: 2017-12-13 | End: 2017-12-13 | Stop reason: HOSPADM

## 2017-12-13 RX ORDER — MIDAZOLAM HYDROCHLORIDE 1 MG/ML
2 INJECTION INTRAMUSCULAR; INTRAVENOUS
Status: DISCONTINUED | OUTPATIENT
Start: 2017-12-13 | End: 2017-12-13 | Stop reason: HOSPADM

## 2017-12-13 RX ORDER — HYDROCODONE BITARTRATE AND ACETAMINOPHEN 5; 325 MG/1; MG/1
1-2 TABLET ORAL EVERY 4 HOURS PRN
Qty: 6 TABLET | Refills: 0 | Status: SHIPPED | OUTPATIENT
Start: 2017-12-13 | End: 2017-12-26

## 2017-12-13 RX ORDER — HYDRALAZINE HYDROCHLORIDE 20 MG/ML
5 INJECTION INTRAMUSCULAR; INTRAVENOUS
Status: DISCONTINUED | OUTPATIENT
Start: 2017-12-13 | End: 2017-12-13 | Stop reason: HOSPADM

## 2017-12-13 RX ORDER — MIDAZOLAM HYDROCHLORIDE 1 MG/ML
1 INJECTION INTRAMUSCULAR; INTRAVENOUS
Status: DISCONTINUED | OUTPATIENT
Start: 2017-12-13 | End: 2017-12-13 | Stop reason: HOSPADM

## 2017-12-13 RX ORDER — ROCURONIUM BROMIDE 10 MG/ML
INJECTION, SOLUTION INTRAVENOUS AS NEEDED
Status: DISCONTINUED | OUTPATIENT
Start: 2017-12-13 | End: 2017-12-13 | Stop reason: SURG

## 2017-12-13 RX ORDER — NALOXONE HCL 0.4 MG/ML
0.04 VIAL (ML) INJECTION AS NEEDED
Status: DISCONTINUED | OUTPATIENT
Start: 2017-12-13 | End: 2017-12-13 | Stop reason: HOSPADM

## 2017-12-13 RX ORDER — IPRATROPIUM BROMIDE AND ALBUTEROL SULFATE 2.5; .5 MG/3ML; MG/3ML
3 SOLUTION RESPIRATORY (INHALATION) ONCE AS NEEDED
Status: DISCONTINUED | OUTPATIENT
Start: 2017-12-13 | End: 2017-12-13 | Stop reason: HOSPADM

## 2017-12-13 RX ORDER — SODIUM CHLORIDE, SODIUM LACTATE, POTASSIUM CHLORIDE, CALCIUM CHLORIDE 600; 310; 30; 20 MG/100ML; MG/100ML; MG/100ML; MG/100ML
INJECTION, SOLUTION INTRAVENOUS CONTINUOUS PRN
Status: DISCONTINUED | OUTPATIENT
Start: 2017-12-13 | End: 2017-12-13 | Stop reason: HOSPADM

## 2017-12-13 RX ORDER — MEPERIDINE HYDROCHLORIDE 25 MG/ML
12.5 INJECTION INTRAMUSCULAR; INTRAVENOUS; SUBCUTANEOUS
Status: DISCONTINUED | OUTPATIENT
Start: 2017-12-13 | End: 2017-12-13 | Stop reason: HOSPADM

## 2017-12-13 RX ORDER — SODIUM CHLORIDE, SODIUM LACTATE, POTASSIUM CHLORIDE, CALCIUM CHLORIDE 600; 310; 30; 20 MG/100ML; MG/100ML; MG/100ML; MG/100ML
1000 INJECTION, SOLUTION INTRAVENOUS CONTINUOUS
Status: DISCONTINUED | OUTPATIENT
Start: 2017-12-13 | End: 2017-12-13 | Stop reason: HOSPADM

## 2017-12-13 RX ORDER — OXYMETAZOLINE HYDROCHLORIDE 0.05 G/100ML
2 SPRAY NASAL
Status: COMPLETED | OUTPATIENT
Start: 2017-12-13 | End: 2017-12-13

## 2017-12-13 RX ORDER — MAGNESIUM HYDROXIDE 1200 MG/15ML
LIQUID ORAL AS NEEDED
Status: DISCONTINUED | OUTPATIENT
Start: 2017-12-13 | End: 2017-12-13 | Stop reason: HOSPADM

## 2017-12-13 RX ORDER — FLUMAZENIL 0.1 MG/ML
0.2 INJECTION INTRAVENOUS AS NEEDED
Status: DISCONTINUED | OUTPATIENT
Start: 2017-12-13 | End: 2017-12-13 | Stop reason: HOSPADM

## 2017-12-13 RX ORDER — MORPHINE SULFATE 2 MG/ML
2 INJECTION, SOLUTION INTRAMUSCULAR; INTRAVENOUS AS NEEDED
Status: DISCONTINUED | OUTPATIENT
Start: 2017-12-13 | End: 2017-12-13 | Stop reason: HOSPADM

## 2017-12-13 RX ORDER — WATER 1000 ML/1000ML
INJECTION, SOLUTION INTRAVENOUS AS NEEDED
Status: DISCONTINUED | OUTPATIENT
Start: 2017-12-13 | End: 2017-12-13 | Stop reason: HOSPADM

## 2017-12-13 RX ORDER — SODIUM CHLORIDE, SODIUM LACTATE, POTASSIUM CHLORIDE, CALCIUM CHLORIDE 600; 310; 30; 20 MG/100ML; MG/100ML; MG/100ML; MG/100ML
100 INJECTION, SOLUTION INTRAVENOUS CONTINUOUS
Status: DISCONTINUED | OUTPATIENT
Start: 2017-12-13 | End: 2017-12-13 | Stop reason: HOSPADM

## 2017-12-13 RX ORDER — PROPOFOL 10 MG/ML
VIAL (ML) INTRAVENOUS AS NEEDED
Status: DISCONTINUED | OUTPATIENT
Start: 2017-12-13 | End: 2017-12-13 | Stop reason: SURG

## 2017-12-13 RX ORDER — SUCCINYLCHOLINE CHLORIDE 20 MG/ML
INJECTION INTRAMUSCULAR; INTRAVENOUS AS NEEDED
Status: DISCONTINUED | OUTPATIENT
Start: 2017-12-13 | End: 2017-12-13 | Stop reason: SURG

## 2017-12-13 RX ADMIN — METOCLOPRAMIDE 5 MG: 5 INJECTION, SOLUTION INTRAMUSCULAR; INTRAVENOUS at 12:30

## 2017-12-13 RX ADMIN — LIDOCAINE HYDROCHLORIDE 0.5 ML: 10 INJECTION, SOLUTION EPIDURAL; INFILTRATION; INTRACAUDAL; PERINEURAL at 07:12

## 2017-12-13 RX ADMIN — FENTANYL CITRATE 150 MCG: 50 INJECTION INTRAMUSCULAR; INTRAVENOUS at 10:05

## 2017-12-13 RX ADMIN — FENTANYL CITRATE 100 MCG: 50 INJECTION INTRAMUSCULAR; INTRAVENOUS at 10:37

## 2017-12-13 RX ADMIN — OXYMETAZOLINE HYDROCHLORIDE 2 SPRAY: 5 SPRAY NASAL at 09:34

## 2017-12-13 RX ADMIN — SODIUM CHLORIDE, POTASSIUM CHLORIDE, SODIUM LACTATE AND CALCIUM CHLORIDE 1000 ML: 600; 310; 30; 20 INJECTION, SOLUTION INTRAVENOUS at 07:14

## 2017-12-13 RX ADMIN — ONDANSETRON HYDROCHLORIDE 4 MG: 2 SOLUTION INTRAMUSCULAR; INTRAVENOUS at 11:25

## 2017-12-13 RX ADMIN — ONDANSETRON 4 MG: 2 INJECTION, SOLUTION INTRAMUSCULAR; INTRAVENOUS at 12:15

## 2017-12-13 RX ADMIN — GLYCOPYRROLATE 0.2 MG: 0.2 INJECTION, SOLUTION INTRAMUSCULAR; INTRAVENOUS at 11:25

## 2017-12-13 RX ADMIN — HYDROCODONE BITARTRATE AND ACETAMINOPHEN 1 TABLET: 5; 325 TABLET ORAL at 13:30

## 2017-12-13 RX ADMIN — ROCURONIUM BROMIDE 5 MG: 10 INJECTION INTRAVENOUS at 10:06

## 2017-12-13 RX ADMIN — OXYMETAZOLINE HYDROCHLORIDE 2 SPRAY: 5 SPRAY NASAL at 09:41

## 2017-12-13 RX ADMIN — Medication 3 MG: at 11:25

## 2017-12-13 RX ADMIN — SUCCINYLCHOLINE CHLORIDE 100 MG: 20 INJECTION, SOLUTION INTRAMUSCULAR; INTRAVENOUS at 10:07

## 2017-12-13 RX ADMIN — OXYMETAZOLINE HYDROCHLORIDE 2 SPRAY: 5 SPRAY NASAL at 09:45

## 2017-12-13 RX ADMIN — MIDAZOLAM 2 MG: 1 INJECTION INTRAMUSCULAR; INTRAVENOUS at 09:33

## 2017-12-13 RX ADMIN — ROCURONIUM BROMIDE 30 MG: 10 INJECTION INTRAVENOUS at 10:20

## 2017-12-13 RX ADMIN — PROPOFOL 150 MG: 10 INJECTION, EMULSION INTRAVENOUS at 10:06

## 2017-12-13 NOTE — H&P
YOB: 1960  Location: [facility]  Location Address: [address] [place] [address], [facility], [address] [place] [address]  Location Phone: [phone]         Chief Complaint   Patient presents with   • Sinus Problem         History of Present Illness  Carlos A Boyer Jr. is a 57 y.o. male.  Carlos A Boyer Jr. is here for evaluation of ENT complaints. The patient has had problems with nasal congestion, sinusitis and sinus pressure  The symptoms are not localized to a particular location. The patient has had moderate to severe symptoms. The symptoms have been present for the last several years There have been no identified factors that aggravate the symptoms. The symptoms are improved by no identifiable factors. The patient is on CPAP.         Medical History         Past Medical History:   Diagnosis Date   • Arthritis     • Asthma     • Carotid disease, bilateral     • Chronic ischemic heart disease     • Chronic sinusitis     • Coronary artery disease     • Deviated septum     • Diabetes mellitus     • Difficulty urinating     • Diverticulitis     • Enlarged prostate     • GERD (gastroesophageal reflux disease)     • Hyperlipidemia     • Hypertension     • Hypertrophy of nasal turbinates     • Keratoderma     • Kidney stone     • Murmur, heart     • Myocardial infarction     • Obesity     • PONV (postoperative nausea and vomiting)     • Psoriasis     • Seizures     • Sinus congestion     • Sleep apnea     • Stroke               Surgical History          Past Surgical History:   Procedure Laterality Date   • CARDIAC CATHETERIZATION   2016     Dr. Broadbent; widely patent previously placed stents in the left anterior descending and obstructive disease involving the diagonal branch which was treated medically   • CARDIAC CATHETERIZATION N/A 2017     Procedure: Left Heart Cath;  Surgeon: Wade Ramey MD;  Location: [facility] INVASIVE LOCATION;  Service:    • CORONARY ANGIOPLASTY       • CORONARY STENT  PLACEMENT         Pt states 5 or 6 stents   • HERNIA REPAIR         x2 inguinal area   • KIDNEY STONE SURGERY       • KNEE SURGERY Right     • THUMB AMPUTATION Left       partial   • TOE AMPUTATION Right       big               Current Outpatient Prescriptions:   •  albuterol (PROVENTIL HFA;VENTOLIN HFA) 108 (90 BASE) MCG/ACT inhaler, Inhale 2 puffs Every 6 (Six) Hours As Needed for wheezing., Disp: , Rfl:   •  carboxymethylcellulose (REFRESH PLUS) 0.5 % solution, 1 drop 4 (Four) Times a Day As Needed for dry eyes., Disp: , Rfl:   •  FLUTICASONE PROPIONATE, NASAL, NA, 50 mcg into each nostril Daily. 2 SPRAYS IN EACH NOSTRIL, Disp: , Rfl:   •  insulin aspart (novoLOG) 100 UNIT/ML injection, Inject 15 Units under the skin 3 (Three) Times a Day Before Meals., Disp: , Rfl:   •  insulin glargine (LANTUS) 100 UNIT/ML injection, Inject 35 Units under the skin 2 (Two) Times a Day., Disp: , Rfl:   •  isosorbide dinitrate (ISORDIL) 20 MG tablet, Take 1 tablet by mouth 2 (Two) Times a Day., Disp: 60 tablet, Rfl: 0  •  levETIRAcetam (KEPPRA) 500 MG tablet, Take 3 tablets in the morning and 4 tablets at bedtime (Patient taking differently: 500 mg. Take 3 tablets in the morning and 4 tablets at bedtime ), Disp: 215 tablet, Rfl: 5  •  lisinopril (PRINIVIL,ZESTRIL) 40 MG tablet, Take 40 mg by mouth. Take a 1/2 tab at bedtime by mouth, Disp: , Rfl:   •  metFORMIN (GLUCOPHAGE) 1000 MG tablet, Take 1,000 mg by mouth 2 (Two) Times a Day With Meals., Disp: , Rfl:   •  metoprolol tartrate (LOPRESSOR) 50 MG tablet, Take 1 tablet by mouth 2 (Two) Times a Day., Disp: 60 tablet, Rfl: 0  •  nitroglycerin (NITROSTAT) 0.4 MG SL tablet, Place 0.4 mg under the tongue Every 5 (Five) Minutes As Needed for chest pain. Take no more than 3 doses in 15 minutes., Disp: , Rfl:   •  pantoprazole (PROTONIX) 40 MG EC tablet, Take 40 mg by mouth 2 (Two) Times a Day., Disp: , Rfl:   •  phenazopyridine (PYRIDIUM) 100 MG tablet, Take 1 tablet by mouth 3 (Three)  Times a Day As Needed (dysuria)., Disp: 12 tablet, Rfl: 0  •  psyllium (METAMUCIL) 58.6 % packet, Take  by mouth Daily. 1 TABLESPOON, Disp: , Rfl:   •  rosuvastatin (CRESTOR) 40 MG tablet, Take 20 mg by mouth Daily., Disp: , Rfl:   •  tamsulosin (FLOMAX) 0.4 MG capsule 24 hr capsule, Take 1 capsule by mouth Every Night., Disp: , Rfl:   •  triamterene-hydrochlorothiazide (MAXZIDE) 75-50 MG per tablet, Take 1 tablet by mouth Daily., Disp: , Rfl:   •  urea (CARMOL) 40 % ointment, Apply  topically Daily., Disp: , Rfl:      Flagyl [metronidazole]; Atorvastatin; and Ciprofloxacin           Family History   Problem Relation Age of Onset   • Heart disease Father     • Heart disease Maternal Grandmother            Social History    Social History            Social History   • Marital status:        Spouse name: N/A   • Number of children: N/A   • Years of education: N/A          Occupational History   • Not on file.             Social History Main Topics   • Smoking status: Former Smoker       Quit date: 1993   • Smokeless tobacco: Former User       Types: Chew       Quit date: 2009         Comment: Pt quit 25 years ago   • Alcohol use No         Comment: quit 2009   • Drug use: No   • Sexual activity: Defer           Other Topics Concern   • Not on file      Social History Narrative            Review of Systems   Constitutional: Negative for activity change, appetite change, chills, diaphoresis, fatigue, fever and unexpected weight change.   HENT: Positive for congestion and sinus pressure. Negative for ear discharge, ear pain, facial swelling, hearing loss, mouth sores, nosebleeds, postnasal drip, rhinorrhea, sneezing, sore throat, tinnitus, trouble swallowing and voice change.    Eyes: Negative for pain, discharge, redness, itching and visual disturbance.   Respiratory: Positive for apnea. Negative for cough, choking, chest tightness, shortness of breath, wheezing and stridor.    Gastrointestinal: Negative for  nausea and vomiting.   Endocrine: Negative for cold intolerance and heat intolerance.   Musculoskeletal: Negative for arthralgias, back pain, gait problem, neck pain and neck stiffness.   Skin: Negative for rash.   Allergic/Immunologic: Negative for environmental allergies and food allergies.   Neurological: Negative for dizziness, tremors, seizures, syncope, facial asymmetry, speech difficulty, weakness, light-headedness, numbness and headaches.   Hematological: Negative for adenopathy. Does not bruise/bleed easily.   Psychiatric/Behavioral: Negative for behavioral problems, sleep disturbance and suicidal ideas. The patient is not nervous/anxious and is not hyperactive.              Vitals:     11/07/17 1019   BP: 164/97   Pulse: 79   Temp: 97.8 °F (36.6 °C)            Objective          Physical Exam  CONSTITUTIONAL: well nourished, alert, oriented, in no acute distress      COMMUNICATION AND VOICE: able to communicate normally, normal voice quality     HEAD: normocephalic, no lesions, atraumatic, no tenderness, no masses      FACE: appearance normal, no lesions, no tenderness, no deformities, facial motion symmetric     SALIVARY GLANDS: parotid glands with no tenderness, no swelling, no masses, submandibular glands with normal size, nontender     EYES: ocular motility normal, eyelids normal, orbits normal, no proptosis, conjunctiva normal , pupils equal, round      EARS:  Hearing: response to conversational voice normal bilaterally   External Ears: auricles without lesions  Otoscopic: tympanic membrane appearance normal, no lesions, no perforation, normal mobility, no fluid     NOSE:  External Nose: structure normal, no tenderness on palpation, no nasal discharge, no lesions, no evidence of trauma, nostrils patent   Intranasal Exam: nasal mucosa edema and inflammaiton, vestibule within normal limits, inferior turbinate hypertrophy, nasal septum with left spur/deviation  Nasopharynx:      ORAL:  Lips: upper and  lower lips without lesion   Teeth: dentition within normal limits for age   Gums: gingivae healthy   Oral Mucosa: oral mucosa normal, no mucosal lesions   Floor of Mouth: Warthin’s duct patent, mucosa normal  Tongue: lingual mucosa normal without lesions, normal tongue mobility   Palate: soft and hard palates with normal mucosa and structure  Oropharynx: oropharyngeal mucosa normal     NECK: neck appearance normal, no mass,  noted without erythema or tenderness     THYROID: no overt thyromegaly, no tenderness, nodules or mass present on palpation, position midline      LYMPH NODES: no lymphadenopathy     CHEST/RESPIRATORY: respiratory effort normal, normal breath sounds      CARDIOVASCULAR: rate and rhythm normal, extremities without cyanosis or edema       NEUROLOGIC/PSYCHIATRIC: oriented to time, place and person, mood normal, affect appropriate, CN II-XII intact grossly        Assessment/Plan           Problems Addressed this Visit               Cardiovascular and Mediastinum     Essential hypertension     Carotid disease, bilateral     Coronary artery disease involving native coronary artery of native heart with angina pectoris     Chronic left arterial ischemic stroke, ICA (internal carotid artery)          Respiratory     HOA on CPAP     Deviated nasal septum - Primary     Relevant Orders     Case Request (Completed)     Comprehensive Metabolic Panel     CBC (No Diff)     Protime-INR     APTT     ECG 12 Lead     XR Chest 2 View     Maribell bullosa     Relevant Orders     Case Request (Completed)     Comprehensive Metabolic Panel     CBC (No Diff)     Protime-INR     APTT     ECG 12 Lead     XR Chest 2 View     Hypertrophy of both inferior nasal turbinates     Relevant Orders     Case Request (Completed)     Comprehensive Metabolic Panel     CBC (No Diff)     Protime-INR     APTT     ECG 12 Lead     XR Chest 2 View     Chronic sinusitis of both maxillary sinuses     Relevant Orders     Case Request (Completed)      Comprehensive Metabolic Panel     CBC (No Diff)     Protime-INR     APTT     ECG 12 Lead     XR Chest 2 View          Digestive     Gastroesophageal reflux disease without esophagitis          Endocrine     Type 2 diabetes mellitus without complication          Nervous and Auditory     Seizure disorder          ENDOSCOPIC FUNCTIONAL SINUS SURGERY W TRACKING, SINUPLASTY, RESECTION OF RIGHT NAVID BULLOSA,  SEPTOPLASTY, RESECT INFERIOR TURBINATES VIA COBLATION (Bilateral)        Orders Placed This Encounter   Procedures   • XR Chest 2 View       Standing Status:   Future       Standing Expiration Date:   11/7/2018       Order Specific Question:   Reason for Exam:       Answer:   pre-op   • Comprehensive Metabolic Panel       Standing Status:   Future       Standing Expiration Date:   11/7/2018   • CBC (No Diff)       Standing Status:   Future       Standing Expiration Date:   11/7/2018   • Protime-INR       Standing Status:   Future       Standing Expiration Date:   11/7/2018   • APTT       Standing Status:   Future       Standing Expiration Date:   11/7/2018   • Follow Anesthesia Guidelines / Standing Orders       Standing Status:   Future   • Obtain Informed Consent       Order Specific Question:   Informed Consent Given For       Answer:   ENDOSCOPIC FUNCTIONAL SINUS SURGERY W TRACKING, SINUPLASTY, RESECTION OF RIGHT NAVID BULLOSA,  SEPTOPLASTY, RESECT INFERIOR TURBINATES VIA COBLATION   • Provide Patient With Instructions on NPO Status   • ECG 12 Lead       Standing Status:   Future       Standing Expiration Date:   11/7/2018       Order Specific Question:   Reason for Exam:       Answer:   preop      Return for Follow-up post-operatively as directed.        Patient Instructions   SEPTOPLASTY AND TUBINOPLASTY: A septoplasty and inferior turbinoplasty were recommended. The risks and benefits were explained including but not limited to pain, bleeding, infection, risks of the general anesthesia, continued septal  deviation, crusting, congestion and septal perforation. Possibilities of continued preoperative symptoms and the possible need for revision surgery and or medical therapy were discussed. Alternatives were discussed. No guarantees were made or implied. Questions were asked appropriately answered.       FUNCTIONAL ENDOSCOPIC SINUS SURGERY: A functional endoscopic sinus surgery was recommended. The risks and benefits were explained including but not limited to pain, bleeding (with the possible need for nasal packing), infection, risks of the general anesthesia, orbital injury with blurred vision or visual loss, cerebrospinal fluid leak, persistent disease, scarring, synichiae and the possibility for the need of reoperation. Possibilities of additional sinus work or less sinus work depending on the status of the nose at the time of the operation was discussed. Alternatives were discussed. No guarantees were made or implied. Questions were asked appropriately answered.

## 2017-12-13 NOTE — DISCHARGE INSTRUCTIONS

## 2017-12-13 NOTE — ANESTHESIA PREPROCEDURE EVALUATION
Anesthesia Evaluation     Patient summary reviewed and Nursing notes reviewed   history of anesthetic complications: PONV  NPO Solid Status: > 8 hours  NPO Liquid Status: > 8 hours     Airway   Mallampati: III  Neck ROM: full  possible difficult intubation  Dental    (+) poor dentition    Comment: Multiple decayed/caries of the molars, both left/right and upper and lower      Pulmonary     breath sounds clear to auscultation  (+) asthma (uses inhalers prn), sleep apnea,   (-) COPD, not a smoker  Cardiovascular   Exercise tolerance: poor (<4 METS)    ECG reviewed  PT is on anticoagulation therapy  Patient on routine beta blocker and Beta blocker given within 24 hours of surgery  Rhythm: regular  Rate: normal    (+) hypertension, valvular problems/murmurs (mild ai, mild mr on echo 10/2016) MR and AI, past MI (x3) , CAD, cardiac stents (5 stents- most recent Feb 2016) more than 12 months ago angina (rare, no recent increase in frequency), PVD (carotid stenosis),     ROS comment: Stress test 2/2017 reviewed: very small area ischemia lateral wall, thought to be low risk test. Reviewed Dr. Ramey's office note- low risk stress test and known small vessel CAD. Plan for continued medical management and cath if worsening chest pain with exertion.     Neuro/Psych  (+) seizures (last seizure 4 months ago), CVA (May 2012- right sided weakness, has improved over time but still has some residual),    GI/Hepatic/Renal/Endo    (+) obesity,  GERD, diabetes mellitus type 2 using insulin,     Musculoskeletal     Abdominal    Substance History      OB/GYN          Other   (+) arthritis                                       Anesthesia Plan    ASA 3     general     intravenous induction   Anesthetic plan and risks discussed with patient.

## 2017-12-13 NOTE — PLAN OF CARE
Problem: Patient Care Overview (Adult)  Goal: Plan of Care Review  Outcome: Ongoing (interventions implemented as appropriate)    12/13/17 1240   Coping/Psychosocial Response Interventions   Plan Of Care Reviewed With patient   Patient Care Overview   Progress improving   Outcome Evaluation   Outcome Summary/Follow up Plan pain controlled with medications, meets criteria for disharge from PACU         Problem: Perioperative Period (Adult)  Goal: Signs and Symptoms of Listed Potential Problems Will be Absent or Manageable (Perioperative Period)  Outcome: Ongoing (interventions implemented as appropriate)

## 2017-12-13 NOTE — PLAN OF CARE
Problem: Perioperative Period (Adult)  Goal: Signs and Symptoms of Listed Potential Problems Will be Absent or Manageable (Perioperative Period)  Outcome: Ongoing (interventions implemented as appropriate)    12/13/17 3294   Perioperative Period   Problems Assessed (Perioperative Period) pain;embolism;hypothermia;hypoxia/hypoxemia;infection;situational response   Problems Present (Perioperative Period) situational response;pain

## 2017-12-13 NOTE — ANESTHESIA PROCEDURE NOTES
Airway  Urgency: elective    Date/Time: 12/13/2017 10:18 AM  Airway not difficult    General Information and Staff    Patient location during procedure: OR  CRNA: GOMEZ SHERMAN    Indications and Patient Condition  Indications for airway management: airway protection    Preoxygenated: yes  MILS maintained throughout  Mask difficulty assessment: 2 - vent by mask + OA or adjuvant +/- NMBA    Final Airway Details  Final airway type: endotracheal airway      Successful airway: ETT  Cuffed: yes   Successful intubation technique: direct laryngoscopy  Facilitating devices/methods: intubating stylet  Endotracheal tube insertion site: oral  Blade: Balderas  Blade size: #2  ETT size: 7.5 mm  Cormack-Lehane Classification: grade IIb - view of arytenoids or posterior of glottis only  Placement verified by: chest auscultation and capnometry   Cuff volume (mL): 5  Measured from: gums  ETT to gums (cm): 21  Number of attempts at approach: 1    Additional Comments  Atraumatic teeth as per pre-op after dl.  Numerous chips remain as identified by pt in pre-op

## 2017-12-13 NOTE — OP NOTE
OPERATIVE NOTE  12/13/2017    NAME: Carlos A Boyer Jr.    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     Approximately 4 mL of 4% cocaine solution impregnating Codman neurosurgical pledgets were placed intranasally and 5 minutes allowed to pass by the clock.  The pledgets were removed and a hemitransfixion incision accomplished 12 mm cephalad to the caudal margin of the quadrangular cartilage.  Septal flaps were elevated bilaterally consisting of the mucoperichondrium.  The deviated portion of quadrangular cartilage was resected with care taken to leave 10 mm of quadrangular cartilage for dorsal support.  The dissection progressed posteriorly to the perpendicular plate of the ethmoid bone and a cartilaginous spur was removed with Kareem forceps and a Neely elevator on the left.  Subsequently  "the caudal incision was closed with interrupted 4-0 plain gut and the ENTrigue surgical stapler utilized to place \"whip\" stitches.    Rigid nasal endoscopy was subsequently performed with the Dissolve 0° endoscope.  On the left the middle turbinate was subluxed medially utilizing a Sims elevator and injection accomplished with 1 mL 1% Xylocaine with epinephrine.  The turbinate was transected from its lateral nasal wall attachments utilizing Bellucci scissors and further removed with Kareem forceps and the ground lamella attachments resected utilizing Rishabh-Cut forceps.  No significant bleeding was encountered at the ground lamella and the little bleeding which was encountered was controlled with the Weck suction cautery.    An anterior ethmoidectomy was performed with the RAD-12microdebrider.  The uncinate process remnant was removed after the natural opening of the maxillary sinus was identified with the blunt ostium seeker.  The ethmoid bulla was infractured and unroofed utilizing the microdebrider.  No intranasal polyposis was appreciated.  The frontal recess appeared patent and so no dissection was performed in this area.  The dissection progressed posteriorly to the anterior portion of the posterior ethmoids.  No true polypoid changes were noted throughout the dissection.  Edematous mucosa was noted but no antral polyposis was appreciated    Inferior turbinate reduction was accomplished with the Coblator through a single penetration via the anterior portion of the inferior turbinate and 3 lesions were created with the Coblator as the Coblator wand was gently withdrawn following full insertion.  No significant bleeding was encountered.    A Regular Propel implant was placed near the antrostomy.   Staberger sinus foam dressing was also placed.  No significant bleeding was noted.   Attention was then turned to the opposite side where a similar procedure was performed with similar findings with the exception that " a kathia bullosa deformity was noted on the right and resected with middle turbinectomy.    The patient tolerated the procedure well without complications and was transported to the postanesthesia care unit in stable condition.    SPECIMENS:  A: Septal, ethmoid bone and mucosa    COMPLICATIONS: NONE    ESTIMATED BLOOD LOSS:  10 cc    Mayank Ibarra MD  12/13/2017

## 2017-12-13 NOTE — ANESTHESIA POSTPROCEDURE EVALUATION
Patient: Carlos A Boyer Jr.    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]; Hypertrophy of both inferior nasal turbinates [J34.3]; Chronic sinusitis of both maxillary sinuses [J32.0]) MD Asif Tidwell CRNA          Anesthesia Type: general  Last vitals  BP   124/52 (12/13/17 1345)   Temp   98.1 °F (36.7 °C) (12/13/17 1242)   Pulse   59 (12/13/17 1345)   Resp   16 (12/13/17 1345)     SpO2   95 % (12/13/17 1345)     Post Anesthesia Care and Evaluation    Patient location during evaluation: PACU  Patient participation: complete - patient participated  Level of consciousness: awake and alert  Pain management: adequate  Airway patency: patent  Anesthetic complications: No anesthetic complications  PONV Status: none  Cardiovascular status: acceptable and hemodynamically stable  Respiratory status: acceptable  Hydration status: acceptable    Comments: Blood pressure 124/52, pulse 59, temperature 98.1 °F (36.7 °C), temperature source Temporal Artery , resp. rate 16, SpO2 95 %.    Patient discharged from PACU based upon Shayla score. Please see RN notes for further details

## 2017-12-13 NOTE — PLAN OF CARE
Problem: Patient Care Overview (Adult)  Goal: Plan of Care Review  Outcome: Outcome(s) achieved Date Met:  12/13/17 12/13/17 1424   Coping/Psychosocial Response Interventions   Plan Of Care Reviewed With patient;spouse   Patient Care Overview   Progress improving   Outcome Evaluation   Outcome Summary/Follow up Plan Patient meets discharge criteria and is ready for discharge.       Goal: Adult Individualization and Mutuality  Outcome: Outcome(s) achieved Date Met:  12/13/17  Goal: Discharge Needs Assessment  Outcome: Outcome(s) achieved Date Met:  12/13/17    Problem: Perioperative Period (Adult)  Goal: Signs and Symptoms of Listed Potential Problems Will be Absent or Manageable (Perioperative Period)  Outcome: Outcome(s) achieved Date Met:  12/13/17

## 2017-12-14 ENCOUNTER — TELEPHONE (OUTPATIENT)
Dept: NEUROLOGY | Facility: CLINIC | Age: 57
End: 2017-12-14

## 2017-12-14 NOTE — TELEPHONE ENCOUNTER
Please fax last office note to Attdalia Ayala at [phone] for his PCP at [facility].

## 2017-12-18 LAB
CYTO UR: NORMAL
LAB AP CASE REPORT: NORMAL
Lab: NORMAL
PATH REPORT.FINAL DX SPEC: NORMAL
PATH REPORT.GROSS SPEC: NORMAL

## 2017-12-20 DIAGNOSIS — J34.2 DEVIATED SEPTUM: ICD-10-CM

## 2017-12-20 DIAGNOSIS — J34.3 HYPERTROPHY OF NASAL TURBINATES: ICD-10-CM

## 2017-12-20 DIAGNOSIS — J32.9 CHRONIC SINUSITIS, UNSPECIFIED LOCATION: ICD-10-CM

## 2017-12-26 ENCOUNTER — OFFICE VISIT (OUTPATIENT)
Dept: OTOLARYNGOLOGY | Facility: CLINIC | Age: 57
End: 2017-12-26

## 2017-12-26 VITALS
WEIGHT: 247 LBS | RESPIRATION RATE: 20 BRPM | BODY MASS INDEX: 33.46 KG/M2 | HEIGHT: 72 IN | DIASTOLIC BLOOD PRESSURE: 78 MMHG | SYSTOLIC BLOOD PRESSURE: 142 MMHG | TEMPERATURE: 98 F | HEART RATE: 68 BPM

## 2017-12-26 DIAGNOSIS — J34.89 CONCHA BULLOSA: ICD-10-CM

## 2017-12-26 DIAGNOSIS — J34.2 DEVIATED NASAL SEPTUM: ICD-10-CM

## 2017-12-26 DIAGNOSIS — G47.33 OSA ON CPAP: ICD-10-CM

## 2017-12-26 DIAGNOSIS — J34.3 HYPERTROPHY OF BOTH INFERIOR NASAL TURBINATES: ICD-10-CM

## 2017-12-26 DIAGNOSIS — Z99.89 OSA ON CPAP: ICD-10-CM

## 2017-12-26 DIAGNOSIS — J32.0 CHRONIC SINUSITIS OF BOTH MAXILLARY SINUSES: Primary | ICD-10-CM

## 2017-12-26 PROCEDURE — 31237 NSL/SINS NDSC SURG BX POLYPC: CPT | Performed by: NURSE PRACTITIONER

## 2017-12-26 NOTE — PROGRESS NOTES
PRIMARY CARE PROVIDER: GINA Felder  REFERRING PROVIDER: No ref. provider found    Chief Complaint   Patient presents with   • Post-op     FESS       Subjective   History of Present Illness:  Carlos A Boyer Jr. is a  57 y.o. male  who presents for follow up s/p nasal septoplasty, inferior turbinoplasty and functional endoscopic sinus surgery on December 13, 2017. The patient has had a relatively normal postoperative course. The patient has had continued problems with nasal congestion. The symptoms are localized to both nasal cavities. He has had moderate symptoms. The symptoms have been present for the last several years There have been no identified factors that aggravate the symptoms. There have been no factors that have improved the symptoms.  He denies  nasal drainage, nasal obstruction, facial pain, facial pressure and headaches.    Review of Systems:  Review of Systems   Constitutional: Negative for chills and fever.   HENT: Positive for congestion. Negative for ear discharge, ear pain, facial swelling, nosebleeds, postnasal drip, rhinorrhea, sinus pressure and sneezing.    Eyes: Negative for pain and redness.   Respiratory: Positive for apnea. Negative for chest tightness, shortness of breath and stridor.    Gastrointestinal: Negative for nausea and vomiting.   Endocrine: Negative for cold intolerance and heat intolerance.   Musculoskeletal: Negative for gait problem, neck pain and neck stiffness.   Skin: Negative for rash.   Allergic/Immunologic: Negative for food allergies.   Neurological: Negative for dizziness, seizures, speech difficulty, light-headedness, numbness and headaches.   Hematological: Negative for adenopathy. Does not bruise/bleed easily.   Psychiatric/Behavioral: Negative for behavioral problems, sleep disturbance and suicidal ideas. The patient is not nervous/anxious and is not hyperactive.        Past History:  Past Medical History:   Diagnosis Date   • Arthritis    • Asthma     • Carotid disease, bilateral    • Chest pain    • Chronic ischemic heart disease    • Chronic sinusitis    • Coronary artery disease    • Deviated septum    • Diabetes mellitus    • Difficulty urinating    • Diverticulitis    • Enlarged prostate    • Fatty liver    • GERD (gastroesophageal reflux disease)    • Hyperlipidemia    • Hypertension    • Hypertrophy of nasal turbinates    • Keratoderma    • Kidney stone    • Migraine    • Murmur, heart    • Myocardial infarction    • Obesity    • PONV (postoperative nausea and vomiting)    • Psoriasis    • Seizures    • Sinus congestion    • Sleep apnea    • SOB (shortness of breath)    • Stroke      Past Surgical History:   Procedure Laterality Date   • CARDIAC CATHETERIZATION  01/2016    Dr. Broadbent; widely patent previously placed stents in the left anterior descending and obstructive disease involving the diagonal branch which was treated medically   • CARDIAC CATHETERIZATION N/A 7/14/2017    Procedure: Left Heart Cath;  Surgeon: Wade Ramey MD;  Location: [facility] CATH INVASIVE LOCATION;  Service:    • CORONARY ANGIOPLASTY     • CORONARY STENT PLACEMENT      Pt states 5 or 6 stents   • ENDOSCOPIC FUNCTIONAL SINUS SURGERY (FESS) Bilateral 12/13/2017    Procedure: PROCEDURE PERFORMED:  Bilateral functional endoscopic anterior ethmoidectomy with bilateral middle meatal antrostomy Septoplasty Right kathia bullosa resection Bilateral inferior turbinate reduction via Coblation;  Surgeon: Mayank Ibarra MD;  Location: [facility] OR;  Service:    • HERNIA REPAIR      x2 inguinal area   • KIDNEY STONE SURGERY     • KNEE SURGERY Right    • OTHER SURGICAL HISTORY      urolift   • THUMB AMPUTATION Left     partial   • TOE AMPUTATION Right     big     Family History   Problem Relation Age of Onset   • Heart disease Father    • Heart disease Maternal Grandmother      Social History   Substance Use Topics   • Smoking status: Former Smoker     Quit date: 1993   • Smokeless  tobacco: Former User     Types: Chew     Quit date: 2009      Comment: Pt quit 25 years ago   • Alcohol use No      Comment: quit 2009     Allergies:  Flagyl [metronidazole]; Atorvastatin; and Ciprofloxacin    Current Outpatient Prescriptions:   •  albuterol (PROVENTIL HFA;VENTOLIN HFA) 108 (90 BASE) MCG/ACT inhaler, Inhale 2 puffs Every 6 (Six) Hours As Needed for wheezing., Disp: , Rfl:   •  aspirin 81 MG chewable tablet, Chew 81 mg Daily., Disp: , Rfl:   •  Calcium Polycarbophil 625 MG chewable tablet, Chew Daily., Disp: , Rfl:   •  carboxymethylcellulose (REFRESH PLUS) 0.5 % solution, 1 drop 4 (Four) Times a Day As Needed for dry eyes., Disp: , Rfl:   •  Emollient (AQUAPHOR EX), Apply  topically Daily. To heels, Disp: , Rfl:   •  enoxaparin (LOVENOX) 120 MG/0.8ML solution syringe, Inject  under the skin Every 12 (Twelve) Hours., Disp: , Rfl:   •  FLUTICASONE PROPIONATE, NASAL, NA, 50 mcg into each nostril Daily. 2 SPRAYS IN EACH NOSTRIL, Disp: , Rfl:   •  insulin aspart (novoLOG) 100 UNIT/ML injection, Inject 25 Units under the skin 3 (Three) Times a Day Before Meals., Disp: , Rfl:   •  insulin glargine (LANTUS) 100 UNIT/ML injection, Inject 50 Units under the skin 2 (Two) Times a Day., Disp: , Rfl:   •  isosorbide dinitrate (ISORDIL) 20 MG tablet, Take 1 tablet by mouth 2 (Two) Times a Day., Disp: 60 tablet, Rfl: 0  •  lamoTRIgine (LaMICtal) 100 MG tablet, Take 100 mg by mouth 2 (Two) Times a Day., Disp: , Rfl:   •  levETIRAcetam (KEPPRA) 500 MG tablet, Take 3 tablets in the morning and 4 tablets at bedtime (Patient taking differently: Take 3,500 mg by mouth Daily. Take 3 tablets in the morning and 4 tablets at bedtime), Disp: 215 tablet, Rfl: 5  •  lisinopril (PRINIVIL,ZESTRIL) 40 MG tablet, Take 40 mg by mouth. Take a 1/2 tab at bedtime by mouth, Disp: , Rfl:   •  metFORMIN (GLUCOPHAGE) 1000 MG tablet, Take 1,000 mg by mouth 2 (Two) Times a Day With Meals., Disp: , Rfl:   •  metoprolol tartrate (LOPRESSOR) 50  MG tablet, Take 1 tablet by mouth 2 (Two) Times a Day., Disp: 60 tablet, Rfl: 0  •  Multiple Vitamin (MULTI VITAMIN PO), Take 1 tablet by mouth Daily., Disp: , Rfl:   •  mupirocin (BACTROBAN) 2 % nasal ointment, into each nostril 2 (Two) Times a Day for 14 days. Apply to the nose twice daily, Disp: 3 g, Rfl: 0  •  naproxen sodium (ALEVE) 220 MG tablet, Take 220 mg by mouth As Needed for Headache., Disp: , Rfl:   •  nitroglycerin (NITROSTAT) 0.4 MG SL tablet, Place 0.4 mg under the tongue Every 5 (Five) Minutes As Needed for chest pain. Take no more than 3 doses in 15 minutes., Disp: , Rfl:   •  pantoprazole (PROTONIX) 40 MG EC tablet, Take 40 mg by mouth 2 (Two) Times a Day., Disp: , Rfl:   •  psyllium (METAMUCIL) 58.6 % packet, Take  by mouth Daily. 1 TABLESPOON, Disp: , Rfl:   •  rosuvastatin (CRESTOR) 40 MG tablet, Take 20 mg by mouth Daily., Disp: , Rfl:   •  triamterene-hydrochlorothiazide (MAXZIDE) 75-50 MG per tablet, Take 1 tablet by mouth Daily., Disp: , Rfl:   •  urea (CARMOL) 40 % ointment, Apply  topically Daily., Disp: , Rfl:       Objective     Vital Signs:  Temp:  [98 °F (36.7 °C)] 98 °F (36.7 °C)  Heart Rate:  [68] 68  Resp:  [20] 20  BP: (142)/(78) 142/78    Physical Exam:  Physical Exam  CONSTITUTIONAL: well nourished, well-developed, alert, oriented, in no acute distress   COMMUNICATION AND VOICE: able to communicate normally, normal voice quality  HEAD: normocephalic, no lesions, atraumatic, no tenderness, no masses   FACE: appearance normal, no lesions, no tenderness, no deformities, facial motion symmetric  SALIVARY GLANDS: parotid glands with no tenderness, no swelling, no masses, submandibular glands with normal size, nontender  EYES: ocular motility normal, eyelids normal, orbits normal, no proptosis, conjunctiva normal , pupils equal, round   EARS:  Hearing: response to conversational voice normal bilaterally   External Ears: auricles without lesions  Otoscopic: tympanic membrane appearance  normal, no lesions, no perforation, normal mobility, no fluid  NOSE:  External Nose: structure normal, no tenderness on palpation, no nasal discharge, no lesions, no evidence of trauma, nostrils patent   Intranasal Exam: see scope note  ORAL:  Lips: upper and lower lips without lesion   NECK: neck appearance normal, no masses or tenderness   LYMPH NODES: no lymphadenopathy  CHEST/RESPIRATORY: respiratory effort normal, normal breath sounds   CARDIOVASCULAR: rate and rhythm normal, extremities without cyanosis or edema    NEUROLOGIC/PSYCHIATRIC: oriented to time, place and person, mood normal, affect appropriate, CN II-XII intact grossly    PROCEDURE:      Nasal Endoscopy  ANESTHESIA:  Topical Ponticaine and Afrin Spray   REASON FOR THE PROCEDURE:  Patient is status post recent nasal surgery with a need for evaluation of the surgical site and possible nasal debridement. The patient consented to operative intervention after a full discussion of risks, benefits, and alternatives. No guarantees were made or implied.   DETAILS OF THE OPERATION:  The patient was seated in the exam room chair. Nasal endoscopy with debridement was performed with the 0 degree scope through the nares after applying nebulized ponticaine/ Afrin spray . A 0 degree endoscope was introduced into the nasal cavity and gently directed to the deeper nasal cavity examining the following structures.   FINDINGS:  Mucosal Surfaces:  The mucosal surfaces demonstrated crusting and post surgical changes   Nasal Septum:  The nasal septum was found to be relatively midline without perforation or hematoma  Inferior Turbinates:  The inferior turbinates were found to have been resected with crusting removed with suction  Middle Turbinates:  The middle turbinates were found to have crusting partially removed with suction  Middle Meatus:  The middle meatus was found to have postop debris bilaterally   Sphenoethmoid Recess:  The sphenoethmoidal recess was found to  have postop debris bilaterally   Nasopharynx:  The nasopharynx was found to have no lesion or mass.   Bilateral debridement was performed using the endoscope with suctioning and forceps.      Assessment   Assessment:  1. Chronic sinusitis of both maxillary sinuses    2. HOA on CPAP    3. Maribell bullosa    4. Deviated nasal septum    5. Hypertrophy of both inferior nasal turbinates        Plan   Plan:    Continue postop nasal hygiene. Call for problems or worsening symptoms.     Return in about 10 days (around 1/5/2018).    My findings and recommendations were discussed and questions were answered.     Olya Hernandez, APRN  12/26/17  1:32 PM

## 2018-01-05 ENCOUNTER — TELEPHONE (OUTPATIENT)
Dept: NEUROLOGY | Facility: CLINIC | Age: 58
End: 2018-01-05

## 2018-01-05 ENCOUNTER — DOCUMENTATION (OUTPATIENT)
Dept: NEUROLOGY | Facility: CLINIC | Age: 58
End: 2018-01-05

## 2018-01-05 RX ORDER — LACOSAMIDE 50 MG/1
50 TABLET ORAL EVERY 12 HOURS SCHEDULED
Qty: 60 TABLET | Refills: 2 | Status: SHIPPED | OUTPATIENT
Start: 2018-01-05 | End: 2018-01-25

## 2018-01-05 NOTE — TELEPHONE ENCOUNTER
I did call and speak with Mr Boyer. He did repeat instructions to me and he did voice understanding. Prescription was faxed to the [facility] per patient request.

## 2018-01-05 NOTE — TELEPHONE ENCOUNTER
He called the office to let Usha Jaquelin know that he has has a seizure. He had nasal surgery in 12/22/17 and is sick now with a cold. I did explain that he is at a higher risk of a seizure now because of all that he is going through. I did tell him that I will pass this information on to Usha and see what she wants to do. I will call him back this afternoon. He did voice understanding.

## 2018-01-06 NOTE — PROGRESS NOTES
I contacted the patient about his seizures situation.  This was at the request of Dr. Kruger.  I spoke with the patient at his home phone number([phone]).  The nurse practitioner who has been following patient had called an extra Vimpat 50 mg by mouth twice a day and patient said that would probably overnighted by the [facility] however I spoke with the patient and told him that if he had further seizures to get to the emergency room to be evaluated.  Apparently there has been some underlying recent surgery (nasal surgery) and he had a cold.  Apparently has been taking the medication appropriately (Keppra and Lamictal) .  I discussed this in detail with Dr. Kruger.  The patient had no further seizures today.  Patient did say he had some chills/no definite fever today and is to get back with PCP about that.  Patient may need CBC/CMP and levels of his seizure medicines if symptoms continue.  Patient is understanding of this and said he would comply and is understanding of potential risk of further seizures with possible complications

## 2018-01-08 ENCOUNTER — APPOINTMENT (OUTPATIENT)
Dept: CT IMAGING | Facility: HOSPITAL | Age: 58
End: 2018-01-08

## 2018-01-08 ENCOUNTER — HOSPITAL ENCOUNTER (EMERGENCY)
Facility: HOSPITAL | Age: 58
Discharge: HOME | End: 2018-01-08
Attending: EMERGENCY MEDICINE | Admitting: EMERGENCY MEDICINE

## 2018-01-08 ENCOUNTER — OFFICE VISIT (OUTPATIENT)
Dept: OTOLARYNGOLOGY | Facility: CLINIC | Age: 58
End: 2018-01-08

## 2018-01-08 VITALS
HEIGHT: 72 IN | TEMPERATURE: 97.5 F | WEIGHT: 249 LBS | BODY MASS INDEX: 33.72 KG/M2 | SYSTOLIC BLOOD PRESSURE: 128 MMHG | RESPIRATION RATE: 11 BRPM | HEART RATE: 58 BPM | DIASTOLIC BLOOD PRESSURE: 67 MMHG | OXYGEN SATURATION: 96 %

## 2018-01-08 VITALS
HEIGHT: 72 IN | DIASTOLIC BLOOD PRESSURE: 88 MMHG | WEIGHT: 249.13 LBS | TEMPERATURE: 97.9 F | SYSTOLIC BLOOD PRESSURE: 137 MMHG | BODY MASS INDEX: 33.74 KG/M2 | HEART RATE: 69 BPM

## 2018-01-08 DIAGNOSIS — G47.33 OSA ON CPAP: ICD-10-CM

## 2018-01-08 DIAGNOSIS — J32.9 CHRONIC SINUSITIS, UNSPECIFIED LOCATION: Primary | ICD-10-CM

## 2018-01-08 DIAGNOSIS — R55 SYNCOPE AND COLLAPSE: Primary | ICD-10-CM

## 2018-01-08 DIAGNOSIS — Z99.89 OSA ON CPAP: ICD-10-CM

## 2018-01-08 LAB
ALBUMIN SERPL-MCNC: 4.3 G/DL (ref 3.5–5)
ALBUMIN/GLOB SERPL: 1.3 G/DL (ref 1.1–2.5)
ALP SERPL-CCNC: 99 U/L (ref 24–120)
ALT SERPL W P-5'-P-CCNC: 59 U/L (ref 0–54)
ANION GAP SERPL CALCULATED.3IONS-SCNC: 13 MMOL/L (ref 4–13)
AST SERPL-CCNC: 38 U/L (ref 7–45)
BASOPHILS # BLD AUTO: 0.03 10*3/MM3 (ref 0–0.2)
BASOPHILS NFR BLD AUTO: 0.5 % (ref 0–2)
BILIRUB SERPL-MCNC: 0.8 MG/DL (ref 0.1–1)
BUN BLD-MCNC: 14 MG/DL (ref 5–21)
BUN/CREAT SERPL: 13.3 (ref 7–25)
CALCIUM SPEC-SCNC: 9.6 MG/DL (ref 8.4–10.4)
CHLORIDE SERPL-SCNC: 101 MMOL/L (ref 98–110)
CO2 SERPL-SCNC: 30 MMOL/L (ref 24–31)
CREAT BLD-MCNC: 1.05 MG/DL (ref 0.5–1.4)
DEPRECATED RDW RBC AUTO: 39.8 FL (ref 40–54)
EOSINOPHIL # BLD AUTO: 0.15 10*3/MM3 (ref 0–0.7)
EOSINOPHIL NFR BLD AUTO: 2.7 % (ref 0–4)
ERYTHROCYTE [DISTWIDTH] IN BLOOD BY AUTOMATED COUNT: 12.5 % (ref 12–15)
GFR SERPL CREATININE-BSD FRML MDRD: 72 ML/MIN/1.73
GLOBULIN UR ELPH-MCNC: 3.3 GM/DL
GLUCOSE BLD-MCNC: 92 MG/DL (ref 70–100)
HCT VFR BLD AUTO: 39.9 % (ref 40–52)
HGB BLD-MCNC: 13.2 G/DL (ref 14–18)
IMM GRANULOCYTES # BLD: 0.01 10*3/MM3 (ref 0–0.03)
IMM GRANULOCYTES NFR BLD: 0.2 % (ref 0–5)
LYMPHOCYTES # BLD AUTO: 1.46 10*3/MM3 (ref 0.72–4.86)
LYMPHOCYTES NFR BLD AUTO: 26.5 % (ref 15–45)
MCH RBC QN AUTO: 29 PG (ref 28–32)
MCHC RBC AUTO-ENTMCNC: 33.1 G/DL (ref 33–36)
MCV RBC AUTO: 87.7 FL (ref 82–95)
MONOCYTES # BLD AUTO: 0.39 10*3/MM3 (ref 0.19–1.3)
MONOCYTES NFR BLD AUTO: 7.1 % (ref 4–12)
NEUTROPHILS # BLD AUTO: 3.47 10*3/MM3 (ref 1.87–8.4)
NEUTROPHILS NFR BLD AUTO: 63 % (ref 39–78)
NRBC BLD MANUAL-RTO: 0 /100 WBC (ref 0–0)
PLAT MORPH BLD: NORMAL
PLATELET # BLD AUTO: 133 10*3/MM3 (ref 130–400)
PMV BLD AUTO: 11.5 FL (ref 6–12)
POTASSIUM BLD-SCNC: 4.2 MMOL/L (ref 3.5–5.3)
PROT SERPL-MCNC: 7.6 G/DL (ref 6.3–8.7)
RBC # BLD AUTO: 4.55 10*6/MM3 (ref 4.8–5.9)
RBC MORPH BLD: NORMAL
SODIUM BLD-SCNC: 144 MMOL/L (ref 135–145)
WBC MORPH BLD: NORMAL
WBC NRBC COR # BLD: 5.51 10*3/MM3 (ref 4.8–10.8)

## 2018-01-08 PROCEDURE — 85025 COMPLETE CBC W/AUTO DIFF WBC: CPT | Performed by: EMERGENCY MEDICINE

## 2018-01-08 PROCEDURE — 80053 COMPREHEN METABOLIC PANEL: CPT | Performed by: EMERGENCY MEDICINE

## 2018-01-08 PROCEDURE — 36415 COLL VENOUS BLD VENIPUNCTURE: CPT

## 2018-01-08 PROCEDURE — 70450 CT HEAD/BRAIN W/O DYE: CPT

## 2018-01-08 PROCEDURE — 31237 NSL/SINS NDSC SURG BX POLYPC: CPT | Performed by: NURSE PRACTITIONER

## 2018-01-08 PROCEDURE — 93005 ELECTROCARDIOGRAM TRACING: CPT | Performed by: EMERGENCY MEDICINE

## 2018-01-08 PROCEDURE — 99284 EMERGENCY DEPT VISIT MOD MDM: CPT

## 2018-01-08 PROCEDURE — 85007 BL SMEAR W/DIFF WBC COUNT: CPT | Performed by: EMERGENCY MEDICINE

## 2018-01-08 RX ORDER — ACETAMINOPHEN 500 MG
1000 TABLET ORAL ONCE
Status: COMPLETED | OUTPATIENT
Start: 2018-01-08 | End: 2018-01-08

## 2018-01-08 RX ADMIN — ACETAMINOPHEN 1000 MG: 500 TABLET, FILM COATED ORAL at 12:01

## 2018-01-08 NOTE — ED NOTES
Pt co dizziness & HA.  Wife at bedside. Reports that he has had a HA & dizziness seizure last week.  Dr White was notified and [facility].  [facility] has not received new [facility] from [facility] yet.  Dr Bo notified.     Gayle Solitario RN  01/08/18 1152       Gayle Solitario RN  01/08/18 115

## 2018-01-08 NOTE — PROGRESS NOTES
YOB: 1960  Location: [facility]  Location Address: [address] [place] [address], [facility], [address] [place] [address]  Location Phone: [phone]    Chief Complaint   Patient presents with   • Sinus Problem       History of Present Illness  Carlos A Boyer Jr. is a 57 y.o. male.  Carlos A Boyer Jr. is here for follow up of ENT complaints S/P fess The patient has had problems with nasal congestion  The symptoms are not localized to a particular location. The patient has had moderate symptoms. The symptoms have been present for the last several weeks The symptoms are aggravated by  no identifiable factors. The symptoms are improved by no identifiable factors.       Past Medical History:   Diagnosis Date   • Arthritis    • Asthma    • Carotid disease, bilateral    • Chest pain    • Chronic ischemic heart disease    • Chronic sinusitis    • Maribell bullosa    • Coronary artery disease    • Deviated septum    • Diabetes mellitus    • Difficulty urinating    • Diverticulitis    • Enlarged prostate    • Fatty liver    • GERD (gastroesophageal reflux disease)    • Hyperlipidemia    • Hypertension    • Hypertrophy of nasal turbinates    • Keratoderma    • Kidney stone    • Migraine    • Murmur, heart    • Myocardial infarction    • Obesity    • PONV (postoperative nausea and vomiting)    • Psoriasis    • Seizures    • Sinus congestion    • Sleep apnea    • SOB (shortness of breath)    • Stroke        Past Surgical History:   Procedure Laterality Date   • CARDIAC CATHETERIZATION  2016    Dr. Broadbent; widely patent previously placed stents in the left anterior descending and obstructive disease involving the diagonal branch which was treated medically   • CARDIAC CATHETERIZATION N/A 2017    Procedure: Left Heart Cath;  Surgeon: Wade Ramey MD;  Location: [facility] INVASIVE LOCATION;  Service:    • CORONARY ANGIOPLASTY     • CORONARY STENT PLACEMENT      Pt states 5 or 6 stents   • ENDOSCOPIC FUNCTIONAL SINUS  SURGERY (FESS) Bilateral 12/13/2017    Procedure: PROCEDURE PERFORMED:  Bilateral functional endoscopic anterior ethmoidectomy with bilateral middle meatal antrostomy Septoplasty Right kathia bullosa resection Bilateral inferior turbinate reduction via Coblation;  Surgeon: Mayank Ibarra MD;  Location: [facility] OR;  Service:    • HERNIA REPAIR      x2 inguinal area   • KIDNEY STONE SURGERY     • KNEE SURGERY Right    • OTHER SURGICAL HISTORY      urolift   • THUMB AMPUTATION Left     partial   • TOE AMPUTATION Right     big         Current Outpatient Prescriptions:   •  albuterol (PROVENTIL HFA;VENTOLIN HFA) 108 (90 BASE) MCG/ACT inhaler, Inhale 2 puffs Every 6 (Six) Hours As Needed for wheezing., Disp: , Rfl:   •  aspirin 81 MG chewable tablet, Chew 81 mg Daily., Disp: , Rfl:   •  Calcium Polycarbophil 625 MG chewable tablet, Chew Daily., Disp: , Rfl:   •  carboxymethylcellulose (REFRESH PLUS) 0.5 % solution, 1 drop 4 (Four) Times a Day As Needed for dry eyes., Disp: , Rfl:   •  Emollient (AQUAPHOR EX), Apply  topically Daily. To heels, Disp: , Rfl:   •  FLUTICASONE PROPIONATE, NASAL, NA, 50 mcg into each nostril Daily. 2 SPRAYS IN EACH NOSTRIL, Disp: , Rfl:   •  insulin aspart (novoLOG) 100 UNIT/ML injection, Inject 25 Units under the skin 3 (Three) Times a Day Before Meals., Disp: , Rfl:   •  insulin glargine (LANTUS) 100 UNIT/ML injection, Inject 50 Units under the skin 2 (Two) Times a Day., Disp: , Rfl:   •  isosorbide dinitrate (ISORDIL) 20 MG tablet, Take 1 tablet by mouth 2 (Two) Times a Day., Disp: 60 tablet, Rfl: 0  •  lacosamide (VIMPAT) 50 MG tablet tablet, Take 1 tablet by mouth Every 12 (Twelve) Hours., Disp: 60 tablet, Rfl: 2  •  lamoTRIgine (LaMICtal) 100 MG tablet, Take 100 mg by mouth 2 (Two) Times a Day., Disp: , Rfl:   •  levETIRAcetam (KEPPRA) 500 MG tablet, Take 3 tablets in the morning and 4 tablets at bedtime (Patient taking differently: Take 3,500 mg by mouth Daily. Take 3 tablets in the  morning and 4 tablets at bedtime), Disp: 215 tablet, Rfl: 5  •  lisinopril (PRINIVIL,ZESTRIL) 40 MG tablet, Take 40 mg by mouth. Take a 1/2 tab at bedtime by mouth, Disp: , Rfl:   •  metFORMIN (GLUCOPHAGE) 1000 MG tablet, Take 1,000 mg by mouth 2 (Two) Times a Day With Meals., Disp: , Rfl:   •  metoprolol tartrate (LOPRESSOR) 50 MG tablet, Take 1 tablet by mouth 2 (Two) Times a Day., Disp: 60 tablet, Rfl: 0  •  Multiple Vitamin (MULTI VITAMIN PO), Take 1 tablet by mouth Daily., Disp: , Rfl:   •  nitroglycerin (NITROSTAT) 0.4 MG SL tablet, Place 0.4 mg under the tongue Every 5 (Five) Minutes As Needed for chest pain. Take no more than 3 doses in 15 minutes., Disp: , Rfl:   •  pantoprazole (PROTONIX) 40 MG EC tablet, Take 40 mg by mouth 2 (Two) Times a Day., Disp: , Rfl:   •  psyllium (METAMUCIL) 58.6 % packet, Take  by mouth Daily. 1 TABLESPOON, Disp: , Rfl:   •  rosuvastatin (CRESTOR) 40 MG tablet, Take 20 mg by mouth Daily., Disp: , Rfl:   •  triamterene-hydrochlorothiazide (MAXZIDE) 75-50 MG per tablet, Take 1 tablet by mouth Daily., Disp: , Rfl:   •  urea (CARMOL) 40 % ointment, Apply  topically Daily., Disp: , Rfl:   •  enoxaparin (LOVENOX) 120 MG/0.8ML solution syringe, Inject  under the skin Every 12 (Twelve) Hours., Disp: , Rfl:   •  naproxen sodium (ALEVE) 220 MG tablet, Take 220 mg by mouth As Needed for Headache., Disp: , Rfl:     Flagyl [metronidazole]; Atorvastatin; and Ciprofloxacin    Family History   Problem Relation Age of Onset   • Heart disease Father    • Heart disease Maternal Grandmother        Social History     Social History   • Marital status:      Spouse name: N/A   • Number of children: N/A   • Years of education: N/A     Occupational History   • Not on file.     Social History Main Topics   • Smoking status: Former Smoker     Quit date: 1993   • Smokeless tobacco: Former User     Types: Chew     Quit date: 2009      Comment: Pt quit 25 years ago   • Alcohol use No      Comment:  quit 2009   • Drug use: No   • Sexual activity: Defer     Other Topics Concern   • Not on file     Social History Narrative       Review of Systems   Constitutional: Negative.    HENT:        SEE HPI   Eyes: Negative.    Respiratory: Negative.    Cardiovascular: Negative.    Gastrointestinal: Negative.    Endocrine: Negative.    Genitourinary: Negative.    Musculoskeletal: Negative.    Skin: Negative.    Allergic/Immunologic: Negative.    Neurological: Negative.    Hematological: Negative.    Psychiatric/Behavioral: Negative.        Vitals:    01/08/18 0907   BP: 137/88   Pulse: 69   Temp: 97.9 °F (36.6 °C)       Objective     Physical Exam  CONSTITUTIONAL: well nourished, alert, oriented, in no acute distress     COMMUNICATION AND VOICE: able to communicate normally, normal voice quality    HEAD: normocephalic, no lesions, atraumatic, no tenderness, no masses     FACE: appearance normal, no lesions, no tenderness, no deformities, facial motion symmetric    SALIVARY GLANDS: parotid glands with no tenderness, no swelling, no masses, submandibular glands with normal size, nontender    EYES: ocular motility normal, eyelids normal, orbits normal, no proptosis, conjunctiva normal , pupils equal, round     EARS:  Hearing: response to conversational voice normal bilaterally   External Ears: auricles without lesions  Otoscopic: tympanic membrane appearance normal, no lesions, no perforation, normal mobility, no fluid    NOSE:  External Nose: structure normal, no tenderness on palpation, no nasal discharge, no lesions, no evidence of trauma, nostrils patent   Intranasal Exam: SEE NASAL ENDOSCOPY       ORAL:  Lips: upper and lower lips without lesion   Teeth: dentition within normal limits for age   Gums: gingivae healthy   Oral Mucosa: oral mucosa normal, no mucosal lesions   Floor of Mouth: Warthin’s duct patent, mucosa normal  Tongue: lingual mucosa normal without lesions, normal tongue mobility   Palate: soft and hard  palates with normal mucosa and structure  Oropharynx: oropharyngeal mucosa normal        NECK: neck appearance normal, no mass,  noted without erythema or tenderness    THYROID: no overt thyromegaly, no tenderness, nodules or mass present on palpation, position midline     LYMPH NODES: no lymphadenopathy    CHEST/RESPIRATORY: respiratory effort normal    CARDIOVASCULAR:  extremities without cyanosis or edema      NEUROLOGIC/PSYCHIATRIC: oriented to time, place and person, mood normal, affect appropriate, CN II-XII intact grossly    Assessment/Plan   Carlos A was seen today for sinus problem.    Diagnoses and all orders for this visit:    Chronic sinusitis, unspecified location    HOA on CPAP      * Surgery not found *  No orders of the defined types were placed in this encounter.    Return in about 1 week (around 1/15/2018).       Patient Instructions   Medical vs surgical options discussed    Call for problems or worsening symptoms      Addendum: Patient began coughing post procedure - as he leaned forward to cough, he passed out falling into the floor - landing on his knees and striking head on carpeted floor.  He suffered an abrasion to left forehead.  He quickly regained consciousness - denied any other symptoms, he was oriented and vital signs remained stable.  He was transferred to the ER via EMS for further evaluation and imaging studies.  Wife was notified.

## 2018-01-08 NOTE — ED PROVIDER NOTES
Subjective   HPI Comments: Sent from ENT where he nasal stents removed and then had coughing spell and then woke up on floor with others around.  Hit head when he fell apparently and has some headache.  Denies chest pain, sob, fever, diaphoresis, etc.    Patient is a 57 y.o. male presenting with syncope.   History provided by:  Patient   used: No    Syncope   Episode history:  Single  Most recent episode:  Today  Duration:  1 minute  Timing:  Constant  Progression:  Resolved  Chronicity:  Recurrent  Context: not blood draw, not bowel movement, not dehydration, not exertion, not inactivity, not medication change, not with normal activity, not sight of blood, not sitting down, not standing up and not urination    Witnessed: yes    Relieved by:  Nothing  Worsened by:  Nothing  Ineffective treatments:  None tried  Associated symptoms: headaches and seizures    Associated symptoms: no anxiety, no chest pain, no confusion, no diaphoresis, no difficulty breathing, no dizziness, no fever, no focal weakness, no malaise/fatigue, no nausea, no palpitations, no recent fall, no recent injury, no recent surgery, no rectal bleeding, no shortness of breath, no visual change, no vomiting and no weakness    Risk factors: seizures    Risk factors: no congenital heart disease, no coronary artery disease and no vascular disease        Review of Systems   Constitutional: Negative.  Negative for diaphoresis, fever and malaise/fatigue.   Respiratory: Negative.  Negative for shortness of breath.    Cardiovascular: Positive for syncope. Negative for chest pain and palpitations.   Gastrointestinal: Negative.  Negative for nausea and vomiting.   Genitourinary: Negative.    Musculoskeletal: Negative.    Neurological: Positive for seizures and headaches. Negative for dizziness, focal weakness and weakness.   Hematological: Negative.    Psychiatric/Behavioral: Negative.  Negative for confusion.   All other systems reviewed and  are negative.      Past Medical History:   Diagnosis Date   • Arthritis    • Asthma    • Carotid disease, bilateral    • Chest pain    • Chronic ischemic heart disease    • Chronic sinusitis    • Kathia bullosa    • Coronary artery disease    • Deviated septum    • Diabetes mellitus    • Difficulty urinating    • Diverticulitis    • Enlarged prostate    • Fatty liver    • GERD (gastroesophageal reflux disease)    • Hyperlipidemia    • Hypertension    • Hypertrophy of nasal turbinates    • Keratoderma    • Kidney stone    • Migraine    • Murmur, heart    • Myocardial infarction    • Obesity    • PONV (postoperative nausea and vomiting)    • Psoriasis    • Seizures    • Sinus congestion    • Sleep apnea    • SOB (shortness of breath)    • Stroke        Allergies   Allergen Reactions   • Flagyl [Metronidazole] Hives   • Atorvastatin Other (See Comments)     Muscle cramps   • Ciprofloxacin Hives       Past Surgical History:   Procedure Laterality Date   • CARDIAC CATHETERIZATION  01/2016    Dr. Broadbent; widely patent previously placed stents in the left anterior descending and obstructive disease involving the diagonal branch which was treated medically   • CARDIAC CATHETERIZATION N/A 7/14/2017    Procedure: Left Heart Cath;  Surgeon: Wade Ramey MD;  Location:  [facility] CATH INVASIVE LOCATION;  Service:    • CORONARY ANGIOPLASTY     • CORONARY STENT PLACEMENT      Pt states 5 or 6 stents   • ENDOSCOPIC FUNCTIONAL SINUS SURGERY (FESS) Bilateral 12/13/2017    Procedure: PROCEDURE PERFORMED:  Bilateral functional endoscopic anterior ethmoidectomy with bilateral middle meatal antrostomy Septoplasty Right kathia bullosa resection Bilateral inferior turbinate reduction via Coblation;  Surgeon: Mayank Ibarra MD;  Location: [facility] OR;  Service:    • HERNIA REPAIR      x2 inguinal area   • KIDNEY STONE SURGERY     • KNEE SURGERY Right    • OTHER SURGICAL HISTORY      urolift   • THUMB AMPUTATION Left     partial   • TOE  AMPUTATION Right     big       Family History   Problem Relation Age of Onset   • Heart disease Father    • Heart disease Maternal Grandmother        Social History     Social History   • Marital status:      Spouse name: N/A   • Number of children: N/A   • Years of education: N/A     Social History Main Topics   • Smoking status: Former Smoker     Quit date: 1993   • Smokeless tobacco: Former User     Types: Chew     Quit date: 2009      Comment: Pt quit 25 years ago   • Alcohol use No      Comment: quit 2009   • Drug use: No   • Sexual activity: Defer     Other Topics Concern   • None     Social History Narrative       Prior to Admission medications    Medication Sig Start Date End Date Taking? Authorizing Provider   albuterol (PROVENTIL HFA;VENTOLIN HFA) 108 (90 BASE) MCG/ACT inhaler Inhale 2 puffs Every 6 (Six) Hours As Needed for wheezing.    Historical Provider, MD   aspirin 81 MG chewable tablet Chew 81 mg Daily.    Historical Provider, MD   Calcium Polycarbophil 625 MG chewable tablet Chew Daily.    Historical Provider, MD   carboxymethylcellulose (REFRESH PLUS) 0.5 % solution 1 drop 4 (Four) Times a Day As Needed for dry eyes.    Historical Provider, MD   Emollient (AQUAPHOR EX) Apply  topically Daily. To heels    Historical Provider, MD   enoxaparin (LOVENOX) 120 MG/0.8ML solution syringe Inject  under the skin Every 12 (Twelve) Hours.    Historical Provider, MD   FLUTICASONE PROPIONATE, NASAL, NA 50 mcg into each nostril Daily. 2 SPRAYS IN EACH NOSTRIL    Historical Provider, MD   insulin aspart (novoLOG) 100 UNIT/ML injection Inject 25 Units under the skin 3 (Three) Times a Day Before Meals.    Historical Provider, MD   insulin glargine (LANTUS) 100 UNIT/ML injection Inject 50 Units under the skin 2 (Two) Times a Day.    Historical Provider, MD   isosorbide dinitrate (ISORDIL) 20 MG tablet Take 1 tablet by mouth 2 (Two) Times a Day. 10/18/16   TEENA Garcia   lacosamide (VIMPAT) 50 MG  tablet tablet Take 1 tablet by mouth Every 12 (Twelve) Hours. 1/5/18   TEENA Castillo   lamoTRIgine (LaMICtal) 100 MG tablet Take 100 mg by mouth 2 (Two) Times a Day.    Historical Provider, MD   levETIRAcetam (KEPPRA) 500 MG tablet Take 3 tablets in the morning and 4 tablets at bedtime  Patient taking differently: Take 3,500 mg by mouth Daily. Take 3 tablets in the morning and 4 tablets at bedtime 8/17/17   TEENA Castillo   lisinopril (PRINIVIL,ZESTRIL) 40 MG tablet Take 40 mg by mouth. Take a 1/2 tab at bedtime by mouth    Historical Provider, MD   metFORMIN (GLUCOPHAGE) 1000 MG tablet Take 1,000 mg by mouth 2 (Two) Times a Day With Meals.    Historical Provider, MD   metoprolol tartrate (LOPRESSOR) 50 MG tablet Take 1 tablet by mouth 2 (Two) Times a Day. 10/18/16   TEENA Garcia   Multiple Vitamin (MULTI VITAMIN PO) Take 1 tablet by mouth Daily.    Historical Provider, MD   naproxen sodium (ALEVE) 220 MG tablet Take 220 mg by mouth As Needed for Headache.    Historical Provider, MD   nitroglycerin (NITROSTAT) 0.4 MG SL tablet Place 0.4 mg under the tongue Every 5 (Five) Minutes As Needed for chest pain. Take no more than 3 doses in 15 minutes.    Historical Provider, MD   pantoprazole (PROTONIX) 40 MG EC tablet Take 40 mg by mouth 2 (Two) Times a Day.    Historical Provider, MD   psyllium (METAMUCIL) 58.6 % packet Take  by mouth Daily. 1 TABLESPOON    Historical Provider, MD   rosuvastatin (CRESTOR) 40 MG tablet Take 20 mg by mouth Daily.    Historical Provider, MD   triamterene-hydrochlorothiazide (MAXZIDE) 75-50 MG per tablet Take 1 tablet by mouth Daily.    Historical Provider, MD   urea (CARMOL) 40 % ointment Apply  topically Daily.    Historical Provider, MD       Medications   acetaminophen (TYLENOL) tablet 1,000 mg (1,000 mg Oral Given 1/8/18 1201)       Vitals:    01/08/18 1350   BP: 128/67   Pulse: 58   Resp: 11   Temp:    SpO2: 96%         Objective   Physical Exam    Constitutional: He is oriented to person, place, and time. He appears well-developed and well-nourished.   HENT:   Head: Normocephalic.   Mouth/Throat: Oropharynx is clear and moist.   Has abrasions across left forehead and lateral to left orbit.   Eyes: EOM are normal. Pupils are equal, round, and reactive to light.   Neck: Normal range of motion. Neck supple.   Cardiovascular: Normal rate and regular rhythm.    Pulmonary/Chest: Effort normal and breath sounds normal.   Abdominal: Soft. Bowel sounds are normal.   Musculoskeletal: Normal range of motion.   Neurological: He is alert and oriented to person, place, and time. He displays normal reflexes. No cranial nerve deficit. He exhibits normal muscle tone. Coordination normal.   Skin: Skin is warm and dry.   Psychiatric: He has a normal mood and affect. His behavior is normal.   Nursing note and vitals reviewed.      Procedures         Lab Results (last 24 hours)     Procedure Component Value Units Date/Time    CBC & Differential [[id number]] Collected:  01/08/18 1041    Specimen:  Blood Updated:  01/08/18 1115    Narrative:       The following orders were created for panel order CBC & Differential.  Procedure                               Abnormality         Status                     ---------                               -----------         ------                     Scan Slide[[id number]]                   Normal              Final result               CBC Auto Differential[[id number]]        Abnormal            Final result                 Please view results for these tests on the individual orders.    Comprehensive Metabolic Panel [[id number]]  (Abnormal) Collected:  01/08/18 1041    Specimen:  Blood Updated:  01/08/18 1108     Glucose 92 mg/dL      BUN 14 mg/dL      Creatinine 1.05 mg/dL      Sodium 144 mmol/L      Potassium 4.2 mmol/L      Chloride 101 mmol/L      CO2 30.0 mmol/L      Calcium 9.6 mg/dL      Total Protein 7.6 g/dL      Albumin 4.30 g/dL       ALT (SGPT) 59 (H) U/L      AST (SGOT) 38 U/L      Alkaline Phosphatase 99 U/L      Total Bilirubin 0.8 mg/dL      eGFR Non African Amer 72 mL/min/1.73      Globulin 3.3 gm/dL      A/G Ratio 1.3 g/dL      BUN/Creatinine Ratio 13.3     Anion Gap 13.0 mmol/L     CBC Auto Differential [[id number]]  (Abnormal) Collected:  01/08/18 1041    Specimen:  Blood Updated:  01/08/18 1115     WBC 5.51 10*3/mm3      RBC 4.55 (L) 10*6/mm3      Hemoglobin 13.2 (L) g/dL      Hematocrit 39.9 (L) %      MCV 87.7 fL      MCH 29.0 pg      MCHC 33.1 g/dL      RDW 12.5 %      RDW-SD 39.8 (L) fl      MPV 11.5 fL      Platelets 133 10*3/mm3      Neutrophil % 63.0 %      Lymphocyte % 26.5 %      Monocyte % 7.1 %      Eosinophil % 2.7 %      Basophil % 0.5 %      Immature Grans % 0.2 %      Neutrophils, Absolute 3.47 10*3/mm3      Lymphocytes, Absolute 1.46 10*3/mm3      Monocytes, Absolute 0.39 10*3/mm3      Eosinophils, Absolute 0.15 10*3/mm3      Basophils, Absolute 0.03 10*3/mm3      Immature Grans, Absolute 0.01 10*3/mm3      nRBC 0.0 /100 WBC     Scan Slide [[id number]]  (Normal) Collected:  01/08/18 1041    Specimen:  Blood Updated:  01/08/18 1115     RBC Morphology Normal     WBC Morphology Normal     Platelet Morphology Normal          CT Head Without Contrast   Final Result       1. Mild cerebral and cerebellar volume loss with chronic microvascular   disease but no evidence of acute intracranial process.   2. Sinus disease of the ethmoid air cells and maxillary sinuses as well   as a small right mastoid effusion.           This report was finalized on 01/08/2018 11:23 by Dr. Dwayne Briceno MD.          ED Course  ED Course   Comment By Time   Told patient his testing here was okay.  I initially felt this was vasovagal but patient's wife has arrived and says that he has had 2 seizures last week where he fell out and she was worried this may be another seizure.  I told them that basically all seizures fall but they say his seizures  before last week involved jerking of extremities but able to talk through it.  They said Dr. White had wanted labs done if he had another seizure but we were unable to get in touch with him immediately to determine what he wanted but patient was stable and wanted to go. Andriy Bo Jr., MD 01/08 1430          MDM  Number of Diagnoses or Management Options  Syncope and collapse: new and requires workup     Amount and/or Complexity of Data Reviewed  Clinical lab tests: ordered and reviewed  Tests in the radiology section of CPT®: ordered and reviewed  Tests in the medicine section of CPT®: ordered and reviewed    Risk of Complications, Morbidity, and/or Mortality  Presenting problems: moderate  Diagnostic procedures: moderate  Management options: moderate    Patient Progress  Patient progress: stable      Final diagnoses:   Syncope and collapse          Andriy Bo Jr., MD  01/08/18 8526

## 2018-01-08 NOTE — ED NOTES
Abrasion cleaned with chlorhexidine & ns.  Polysporin applied.        Gayle Solitario RN  01/08/18 0715

## 2018-01-08 NOTE — PROGRESS NOTES
PROCEDURE NOTE      PROCEDURE:    Nasal Endoscopy  ANESTHESIA:  Topical Ponticaine and Afrin Spray   REASON FOR THE PROCEDURE:  Patient is status post recent nasal surgery with a need for evaluation of the surgical site and possible nasal debridement. The patient consented to operative intervention after a full discussion of risks, benefits, and alternatives. No guarantees were made or implied.   DETAILS OF THE OPERATION:  The patient was seated in the exam room chair. Nasal endoscopy with debridement was performed with the 0 degree scope through the nares after applying nebulized ponticaine/ Afrin spray . A 0 degree endoscope was introduced into the nasal cavity and gently directed to the deeper nasal cavity examining the following structures.   FINDINGS:  Mucosal Surfaces:  The mucosal surfaces demonstrated crusting and post surgical changes.   Nasal Septum:  The nasal septum was found to have healing incision with crusting  Inferior Turbinates:  The inferior turbinates were found to have crusting   Middle Turbinates:  The middle turbinates were found to crusting removed with suction  Middle Meatus:  The middle meatus were found to have normal mucosa.   Sphenoethmoid Recess:  The sphenoethmoidal recess was found to have normal mucosa.   Nasopharynx:  The nasopharynx was found to have no lesion or mass.   Bilateral debridement was performed using the endoscope with suctioning and forceps.   Bilateral implants unable to be removed - procedure aborted patient began coughing and passed out, fell into floor.

## 2018-01-09 ENCOUNTER — OFFICE VISIT (OUTPATIENT)
Dept: NEUROLOGY | Facility: CLINIC | Age: 58
End: 2018-01-09

## 2018-01-09 VITALS
BODY MASS INDEX: 34.4 KG/M2 | HEIGHT: 72 IN | WEIGHT: 254 LBS | SYSTOLIC BLOOD PRESSURE: 120 MMHG | DIASTOLIC BLOOD PRESSURE: 80 MMHG | HEART RATE: 81 BPM

## 2018-01-09 DIAGNOSIS — R55 SYNCOPE AND COLLAPSE: Primary | ICD-10-CM

## 2018-01-09 DIAGNOSIS — E11.9 TYPE 2 DIABETES MELLITUS WITHOUT COMPLICATION, WITH LONG-TERM CURRENT USE OF INSULIN (HCC): ICD-10-CM

## 2018-01-09 DIAGNOSIS — R55 COUGH SYNCOPE: ICD-10-CM

## 2018-01-09 DIAGNOSIS — Z79.4 TYPE 2 DIABETES MELLITUS WITHOUT COMPLICATION, WITH LONG-TERM CURRENT USE OF INSULIN (HCC): ICD-10-CM

## 2018-01-09 DIAGNOSIS — G40.909 SEIZURE DISORDER (HCC): ICD-10-CM

## 2018-01-09 DIAGNOSIS — Z99.89 OSA ON CPAP: ICD-10-CM

## 2018-01-09 DIAGNOSIS — R05.4 COUGH SYNCOPE: ICD-10-CM

## 2018-01-09 DIAGNOSIS — I77.9 CAROTID DISEASE, BILATERAL (HCC): ICD-10-CM

## 2018-01-09 DIAGNOSIS — G47.33 OSA ON CPAP: ICD-10-CM

## 2018-01-09 PROCEDURE — 99214 OFFICE O/P EST MOD 30 MIN: CPT | Performed by: CLINICAL NURSE SPECIALIST

## 2018-01-09 RX ORDER — BENZONATATE 100 MG/1
100 CAPSULE ORAL 3 TIMES DAILY PRN
Qty: 90 CAPSULE | Refills: 0 | Status: ON HOLD | OUTPATIENT
Start: 2018-01-09 | End: 2018-03-15

## 2018-01-09 NOTE — PROGRESS NOTES
"Subjective     Chief Complaint   Patient presents with   • Seizures         Carlos A Boyer Jr. is a 57 y.o. male ambidextrous retiree, .  He is here today for problem visit of breakthrough seizures and syncope.  He was last seen 11/20/17.  He does take  tolerating Keppra 500 mg 3 in the AM and 4 in the PM and lamictal 200 mg BID.  He has not been able to wear CPAP secondary to  nasal turbinate hypertrophy and nasal surgery 12/2017.  He did have MRI done here 5/5/17 that showed hippocampal asymetry and is scheduled for repeat MRI in 12/1/17 for comparison BUT WAS NOT done secondary to nasal surgery.   As you recall, He is a prior patient of Dr. REJI Roa.  He had an extensive work up at [facility] and found to have seizure disorder. He has been taking Keppra and Lamictal ever since.        January 3, 2017 had trembling episode  Had a coughing fit and had spell  More characteristic of his seizure lasted about 3-4 minutes and had complete loss of consciousness. Had nasal surgery 12/13/17. No changes to medications Was sick with  URI.  No fever. Has been taking mucinex.productive cought white sputum. Had a 2nd episode 1/4/17 was very similar to previous episode. Was leaning forward to get out of recliner and became light headed and again another coughing episode. Stood up and became more light headed and sat back done and 'passed out\" for about 2-3 minutes this episode unwitnessed but patient was watching TV and familiar with the movie. The next episode was 1/7/17 may not have been coughing. Was watching TV and next thing remembers \"woke up\". With neither episode did not have incontinence, and no tongue biting. Has had a HA since 1/3/17. After did have some confusion but uncertain how long it lasted. Had not checked blood sugar with the spells.   Has not used CPAP since November 2017. Just recently can use for about 1 hour.     In the past patient has had some dizziness upon standing and " with position changes. No change in BP today with during orthostatic but did have blurriness of vision when going from lying to sitting. None from sitting to standing.     Yesterday 1/8/17 had ENT appointment was to have hardware removed from nares and had witnessed syncopal episode after coughing episode and per ENT note passed out and fell to floor on knees and fell on head and had abrasion. Per note, patient regained consciousness quickly and no other symptoms observed. EMS was called and patient was taken to [facility] ED. He was evaluated by Dr. Bo. I did speak to Dr. Bo yesterday and the patient had recovered and had just been discharged prior to my call. Patient did have CT head and was negative for acute process.    Seizures    This is a chronic (2012, would occur 2-4 times a month and last up to 10 mintues) problem. Episode onset: last episode was about April 2017. Duration: last about 5 minutes now with medications. Associated symptoms include headaches. Pertinent negatives include no confusion, no visual disturbance, no chest pain, no nausea, no vomiting and no diarrhea. Characteristics include rhythmic jerking. Characteristics do not include bowel incontinence, bladder incontinence, loss of consciousness, bit tongue or apnea. never had LOC, would have tremoring from inside out, and would have tremor in both arms and rarely in legs. would have staring , can hear Associated symptoms comments: fatigue.   Syncope   This is a new problem. The current episode started in the past 7 days. Episode frequency: at least 3 times since 1/3/17. The problem has been resolved. He lost consciousness for a period of 1 to 5 minutes (no more than 3 minutes). Exacerbated by: severe forceful cough. Associated symptoms include back pain and headaches. Pertinent negatives include no bladder incontinence, bowel incontinence, chest pain, confusion, dizziness, fever, nausea, vomiting or weakness. He has tried nothing for the  symptoms. The treatment provided no relief. His past medical history is significant for HTN and seizures. leslye and has not used CPAP because of nasal surgery.        Current Outpatient Prescriptions   Medication Sig Dispense Refill   • albuterol (PROVENTIL HFA;VENTOLIN HFA) 108 (90 BASE) MCG/ACT inhaler Inhale 2 puffs Every 6 (Six) Hours As Needed for wheezing.     • aspirin 81 MG chewable tablet Chew 81 mg Daily.     • Calcium Polycarbophil 625 MG chewable tablet Chew Daily.     • carboxymethylcellulose (REFRESH PLUS) 0.5 % solution 1 drop 4 (Four) Times a Day As Needed for dry eyes.     • Emollient (AQUAPHOR EX) Apply  topically Daily. To heels     • enoxaparin (LOVENOX) 120 MG/0.8ML solution syringe Inject  under the skin Every 12 (Twelve) Hours.     • FLUTICASONE PROPIONATE, NASAL, NA 50 mcg into each nostril Daily. 2 SPRAYS IN EACH NOSTRIL     • insulin aspart (novoLOG) 100 UNIT/ML injection Inject 25 Units under the skin 3 (Three) Times a Day Before Meals.     • insulin glargine (LANTUS) 100 UNIT/ML injection Inject 50 Units under the skin 2 (Two) Times a Day.     • isosorbide dinitrate (ISORDIL) 20 MG tablet Take 1 tablet by mouth 2 (Two) Times a Day. 60 tablet 0   • lacosamide (VIMPAT) 50 MG tablet tablet Take 1 tablet by mouth Every 12 (Twelve) Hours. 60 tablet 2   • lamoTRIgine (LaMICtal) 100 MG tablet Take 100 mg by mouth 2 (Two) Times a Day.     • levETIRAcetam (KEPPRA) 500 MG tablet Take 3 tablets in the morning and 4 tablets at bedtime (Patient taking differently: Take 3,500 mg by mouth Daily. Take 3 tablets in the morning and 4 tablets at bedtime) 215 tablet 5   • lisinopril (PRINIVIL,ZESTRIL) 40 MG tablet Take 40 mg by mouth. Take a 1/2 tab at bedtime by mouth     • metFORMIN (GLUCOPHAGE) 1000 MG tablet Take 1,000 mg by mouth 2 (Two) Times a Day With Meals.     • metoprolol tartrate (LOPRESSOR) 50 MG tablet Take 1 tablet by mouth 2 (Two) Times a Day. 60 tablet 0   • Multiple Vitamin (MULTI VITAMIN PO)  Take 1 tablet by mouth Daily.     • naproxen sodium (ALEVE) 220 MG tablet Take 220 mg by mouth As Needed for Headache.     • nitroglycerin (NITROSTAT) 0.4 MG SL tablet Place 0.4 mg under the tongue Every 5 (Five) Minutes As Needed for chest pain. Take no more than 3 doses in 15 minutes.     • pantoprazole (PROTONIX) 40 MG EC tablet Take 40 mg by mouth 2 (Two) Times a Day.     • psyllium (METAMUCIL) 58.6 % packet Take  by mouth Daily. 1 TABLESPOON     • rosuvastatin (CRESTOR) 40 MG tablet Take 20 mg by mouth Daily.     • triamterene-hydrochlorothiazide (MAXZIDE) 75-50 MG per tablet Take 1 tablet by mouth Daily.     • urea (CARMOL) 40 % ointment Apply  topically Daily.     • benzonatate (TESSALON PERLES) 100 MG capsule Take 1 capsule by mouth 3 (Three) Times a Day As Needed for Cough. 90 capsule 0     No current facility-administered medications for this visit.        Past Medical History:   Diagnosis Date   • Arthritis    • Asthma    • Carotid disease, bilateral    • Chest pain    • Chronic ischemic heart disease    • Chronic sinusitis    • Maribell bullosa    • Coronary artery disease    • Deviated septum    • Diabetes mellitus    • Difficulty urinating    • Diverticulitis    • Enlarged prostate    • Fatty liver    • GERD (gastroesophageal reflux disease)    • Hyperlipidemia    • Hypertension    • Hypertrophy of nasal turbinates    • Keratoderma    • Kidney stone    • Migraine    • Murmur, heart    • Myocardial infarction    • Obesity    • PONV (postoperative nausea and vomiting)    • Psoriasis    • Seizures    • Sinus congestion    • Sleep apnea    • SOB (shortness of breath)    • Stroke        Past Surgical History:   Procedure Laterality Date   • CARDIAC CATHETERIZATION  01/2016    Dr. Broadbent; widely patent previously placed stents in the left anterior descending and obstructive disease involving the diagonal branch which was treated medically   • CARDIAC CATHETERIZATION N/A 7/14/2017    Procedure: Left Heart  Cath;  Surgeon: Wade Ramey MD;  Location:  [facility] CATH INVASIVE LOCATION;  Service:    • CORONARY ANGIOPLASTY     • CORONARY STENT PLACEMENT      Pt states 5 or 6 stents   • ENDOSCOPIC FUNCTIONAL SINUS SURGERY (FESS) Bilateral 12/13/2017    Procedure: PROCEDURE PERFORMED:  Bilateral functional endoscopic anterior ethmoidectomy with bilateral middle meatal antrostomy Septoplasty Right kathia bullosa resection Bilateral inferior turbinate reduction via Coblation;  Surgeon: Mayank Ibarra MD;  Location:  [facility] OR;  Service:    • HERNIA REPAIR      x2 inguinal area   • KIDNEY STONE SURGERY     • KNEE SURGERY Right    • OTHER SURGICAL HISTORY      urolift   • THUMB AMPUTATION Left     partial   • TOE AMPUTATION Right     big       family history includes Heart disease in his father and maternal grandmother.    Social History   Substance Use Topics   • Smoking status: Former Smoker     Quit date: 1993   • Smokeless tobacco: Former User     Types: Chew     Quit date: 2009      Comment: Pt quit 25 years ago   • Alcohol use No      Comment: quit 2009       Review of Systems   Constitutional: Negative.  Negative for fatigue and fever.   HENT: Positive for sinus pressure. Negative for hearing loss and postnasal drip.    Eyes: Negative.  Negative for visual disturbance.   Respiratory: Negative.  Negative for apnea, chest tightness and shortness of breath.    Cardiovascular: Positive for syncope. Negative for chest pain.   Gastrointestinal: Negative.  Negative for bowel incontinence, constipation, diarrhea, nausea and vomiting.   Endocrine: Negative.    Genitourinary: Negative.  Negative for bladder incontinence, dysuria and frequency.   Musculoskeletal: Positive for back pain. Negative for arthralgias, gait problem and myalgias.   Skin: Negative.    Allergic/Immunologic: Negative.    Neurological: Positive for seizures, syncope and headaches. Negative for dizziness, tremors, loss of consciousness and weakness.  "  Hematological: Negative.  Negative for adenopathy.   Psychiatric/Behavioral: Negative.  Negative for agitation, confusion and hallucinations.   All other systems reviewed and are negative.      Objective     /80 (BP Location: Left arm, Patient Position: Standing)  Pulse 81  Ht 182.9 cm (72\")  Wt 115 kg (254 lb)  BMI 34.45 kg/m2, Body mass index is 34.45 kg/(m^2).    Physical Exam   Constitutional: He is oriented to person, place, and time. He appears well-developed and well-nourished. He is cooperative.   HENT:   Head: Normocephalic. Head is with abrasion.       Right Ear: Hearing and external ear normal.   Left Ear: Hearing and external ear normal.   Nose: Nose normal.   Mouth/Throat: Oropharynx is clear and moist.   Eyes: Conjunctivae, EOM and lids are normal. Pupils are equal, round, and reactive to light.   Neck: Normal range of motion. Neck supple. Carotid bruit is not present.   Cardiovascular: Normal rate, regular rhythm, S1 normal, S2 normal and normal heart sounds.    No murmur heard.  Pulmonary/Chest: Effort normal and breath sounds normal.   Frequent severe forceful cough   Abdominal: Soft. Bowel sounds are normal.   Musculoskeletal: Normal range of motion.   Neurological: He is alert and oriented to person, place, and time. He has normal strength and normal reflexes. He displays no tremor. No cranial nerve deficit or sensory deficit. He exhibits normal muscle tone. He displays a negative Romberg sign. Coordination and gait normal. GCS eye subscore is 4. GCS verbal subscore is 5. GCS motor subscore is 6.   Reflex Scores:       Tricep reflexes are 2+ on the right side and 2+ on the left side.       Bicep reflexes are 2+ on the right side and 2+ on the left side.       Brachioradialis reflexes are 2+ on the right side and 2+ on the left side.       Patellar reflexes are 2+ on the right side and 2+ on the left side.       Achilles reflexes are 2+ on the right side and 2+ on the left side.  Skin: " Skin is warm and dry.   Psychiatric: He has a normal mood and affect. His speech is normal and behavior is normal. Cognition and memory are normal.   Nursing note and vitals reviewed.      Results for orders placed or performed during the hospital encounter of 01/08/18   Comprehensive Metabolic Panel   Result Value Ref Range    Glucose 92 70 - 100 mg/dL    BUN 14 5 - 21 mg/dL    Creatinine 1.05 0.50 - 1.40 mg/dL    Sodium 144 135 - 145 mmol/L    Potassium 4.2 3.5 - 5.3 mmol/L    Chloride 101 98 - 110 mmol/L    CO2 30.0 24.0 - 31.0 mmol/L    Calcium 9.6 8.4 - 10.4 mg/dL    Total Protein 7.6 6.3 - 8.7 g/dL    Albumin 4.30 3.50 - 5.00 g/dL    ALT (SGPT) 59 (H) 0 - 54 U/L    AST (SGOT) 38 7 - 45 U/L    Alkaline Phosphatase 99 24 - 120 U/L    Total Bilirubin 0.8 0.1 - 1.0 mg/dL    eGFR Non African Amer 72 >60 mL/min/1.73    Globulin 3.3 gm/dL    A/G Ratio 1.3 1.1 - 2.5 g/dL    BUN/Creatinine Ratio 13.3 7.0 - 25.0    Anion Gap 13.0 4.0 - 13.0 mmol/L   CBC Auto Differential   Result Value Ref Range    WBC 5.51 4.80 - 10.80 10*3/mm3    RBC 4.55 (L) 4.80 - 5.90 10*6/mm3    Hemoglobin 13.2 (L) 14.0 - 18.0 g/dL    Hematocrit 39.9 (L) 40.0 - 52.0 %    MCV 87.7 82.0 - 95.0 fL    MCH 29.0 28.0 - 32.0 pg    MCHC 33.1 33.0 - 36.0 g/dL    RDW 12.5 12.0 - 15.0 %    RDW-SD 39.8 (L) 40.0 - 54.0 fl    MPV 11.5 6.0 - 12.0 fL    Platelets 133 130 - 400 10*3/mm3    Neutrophil % 63.0 39.0 - 78.0 %    Lymphocyte % 26.5 15.0 - 45.0 %    Monocyte % 7.1 4.0 - 12.0 %    Eosinophil % 2.7 0.0 - 4.0 %    Basophil % 0.5 0.0 - 2.0 %    Immature Grans % 0.2 0.0 - 5.0 %    Neutrophils, Absolute 3.47 1.87 - 8.40 10*3/mm3    Lymphocytes, Absolute 1.46 0.72 - 4.86 10*3/mm3    Monocytes, Absolute 0.39 0.19 - 1.30 10*3/mm3    Eosinophils, Absolute 0.15 0.00 - 0.70 10*3/mm3    Basophils, Absolute 0.03 0.00 - 0.20 10*3/mm3    Immature Grans, Absolute 0.01 0.00 - 0.03 10*3/mm3    nRBC 0.0 0.0 - 0.0 /100 WBC   Scan Slide   Result Value Ref Range    RBC  Morphology Normal Normal    WBC Morphology Normal Normal    Platelet Morphology Normal Normal      CT HEAD: IMPRESSION:      1. Mild cerebral and cerebellar volume loss with chronic microvascular  disease but no evidence of acute intracranial process.  2. Sinus disease of the ethmoid air cells and maxillary sinuses as well  as a small right mastoid effusion.          ASSESSMENT/PLAN    Diagnoses and all orders for this visit:    Syncope and collapse  -     EEG (Hospital Performed); Future    Carotid disease, bilateral    HOA on CPAP    Type 2 diabetes mellitus without complication, with long-term current use of insulin    Cough syncope  -     EEG (Hospital Performed); Future    Seizure disorder  -     EEG (Hospital Performed); Future    Other orders  -     benzonatate (TESSALON PERLES) 100 MG capsule; Take 1 capsule by mouth 3 (Three) Times a Day As Needed for Cough.  MEDICAL DECISION MAKIN. Patient not able tolerate  CPAP at present secondary to nasal surgery 2017 and to resume CPAP when cleared by Dr. Ibarra after upcoming surgery  2.obtain EEG. Uncertain etiology of syncope. May be breakthrough seizure but I feel more likely consistent with cough induced syncope. Orthostatic BP negative. Patient does have history of hypotension in the past. ADD TESSALON SHARITA TID PRN AND counseled on side effects. WILL HOLD OFF ON ADDING VIMPAT AT THIS TIME.  3. Continue with Keppra 1500 mg in AM and 2000 mg in PM  4. Continue with lamictal 200 mg BID per PCP  5.  bp managed by PCP  6. Blood glucose managed by PCP and A1C goal less than 7  7. Statin per PCP for LDL goal less than 70.  8. Continue with ASA for secondary stroke prevention  9. Patient is counseled on stroke signs and symptoms using FAST and Time Saved is Brain Saved.  10. Discussed secondary stroke prevention to include a systolic blood pressure goal of less than 140, LDL goal less than 70, and 30-40 minutes of heart rate elevating exercise 3-4 times per  week. Continue antiplatelet.   11.Seizure precautions were discussed to include no tub baths, no swimming, avoiding lack of sleep, and avoiding known triggers. Education given of things that may contribute to a seizure to include, but not limited to: stressful situations, fever, fatigue, lack of sleep, low blood sugar, hyperventilation, flashing lights, and caffeine. Instructions given to take seizure medications as prescribed. Education given to family member on what to do during a seizure and care following the seizure. Education given to contact this office prior to stopping or changing any medications.  12. Former smoker, currently not using tobacco  13. NO DRIVING UNTIL SEEN IN FOLLOW UP  14. Patient's BMI is above normal parameters. Follow-up plan includes:  no follow-up required.  15. Obtain MRI for evaluation of hippocampal asymetry when cleared by Dr. MARIA G Pelletier.     At least 25 minutes spent in coordination of care and answering questions.     allergies and all known medications/prescriptions have been reviewed using resources available on this encounter.    Return in about 2 weeks (around 1/23/2018).        TEENA Win

## 2018-01-15 NOTE — ED NOTES
"ED Call Back Questions    1. How are you doing since leaving the Emergency Department?    Better, will schedule PCP soon.  2. Do you have any questions about your discharge instructions? No     3. Have you filled your new prescriptions yet? N/A  a. Do you have any questions about those medications? N/A    4. Were you able to make a follow-up appointment with the physician? No     5. Do you have a primary care physician? Yes   a. If No, would you like for me to set you up with one? N/A  i. If Yes, “I will have our ED  give you a call right back at this number to work with you on the best time for an appointment.”    6. We are always looking to get better at what we do. Do you have any suggestions for what we can do to be even better? No   a. If Yes, \"Thank you for sharing your concerns. I apologize. I will follow up with our manager and patient . Would you like someone to call you back?\" N/A    7. Is there anything else I can do for you? No     "

## 2018-01-23 ENCOUNTER — HOSPITAL ENCOUNTER (OUTPATIENT)
Dept: NEUROLOGY | Facility: HOSPITAL | Age: 58
Discharge: HOME | End: 2018-01-23
Admitting: CLINICAL NURSE SPECIALIST

## 2018-01-23 DIAGNOSIS — R55 COUGH SYNCOPE: ICD-10-CM

## 2018-01-23 DIAGNOSIS — R05.4 COUGH SYNCOPE: ICD-10-CM

## 2018-01-23 DIAGNOSIS — G40.909 SEIZURE DISORDER (HCC): ICD-10-CM

## 2018-01-23 DIAGNOSIS — R55 SYNCOPE AND COLLAPSE: ICD-10-CM

## 2018-01-23 PROCEDURE — 95819 EEG AWAKE AND ASLEEP: CPT | Performed by: PSYCHIATRY & NEUROLOGY

## 2018-01-23 PROCEDURE — 95819 EEG AWAKE AND ASLEEP: CPT

## 2018-01-25 ENCOUNTER — OFFICE VISIT (OUTPATIENT)
Dept: OTOLARYNGOLOGY | Facility: CLINIC | Age: 58
End: 2018-01-25

## 2018-01-25 ENCOUNTER — OFFICE VISIT (OUTPATIENT)
Dept: NEUROLOGY | Facility: CLINIC | Age: 58
End: 2018-01-25

## 2018-01-25 VITALS
SYSTOLIC BLOOD PRESSURE: 130 MMHG | BODY MASS INDEX: 33.46 KG/M2 | HEIGHT: 72 IN | TEMPERATURE: 98.2 F | DIASTOLIC BLOOD PRESSURE: 88 MMHG | WEIGHT: 247 LBS

## 2018-01-25 VITALS
HEIGHT: 72 IN | WEIGHT: 248 LBS | BODY MASS INDEX: 33.59 KG/M2 | DIASTOLIC BLOOD PRESSURE: 80 MMHG | HEART RATE: 74 BPM | SYSTOLIC BLOOD PRESSURE: 120 MMHG

## 2018-01-25 DIAGNOSIS — R55 SYNCOPE AND COLLAPSE: ICD-10-CM

## 2018-01-25 DIAGNOSIS — G47.33 OSA ON CPAP: ICD-10-CM

## 2018-01-25 DIAGNOSIS — G40.909 SEIZURE DISORDER (HCC): Primary | ICD-10-CM

## 2018-01-25 DIAGNOSIS — Z99.89 OSA ON CPAP: ICD-10-CM

## 2018-01-25 DIAGNOSIS — I69.30 CHRONIC LEFT ARTERIAL ISCHEMIC STROKE, ICA (INTERNAL CAROTID ARTERY): ICD-10-CM

## 2018-01-25 DIAGNOSIS — E78.2 MIXED HYPERLIPIDEMIA: ICD-10-CM

## 2018-01-25 DIAGNOSIS — I77.9 CAROTID DISEASE, BILATERAL (HCC): ICD-10-CM

## 2018-01-25 DIAGNOSIS — Z98.890 S/P FESS (FUNCTIONAL ENDOSCOPIC SINUS SURGERY): Primary | ICD-10-CM

## 2018-01-25 DIAGNOSIS — I10 ESSENTIAL HYPERTENSION: ICD-10-CM

## 2018-01-25 DIAGNOSIS — Z79.4 TYPE 2 DIABETES MELLITUS WITHOUT COMPLICATION, WITH LONG-TERM CURRENT USE OF INSULIN (HCC): ICD-10-CM

## 2018-01-25 DIAGNOSIS — E11.9 TYPE 2 DIABETES MELLITUS WITHOUT COMPLICATION, WITH LONG-TERM CURRENT USE OF INSULIN (HCC): ICD-10-CM

## 2018-01-25 DIAGNOSIS — I95.9 HYPOTENSION, UNSPECIFIED HYPOTENSION TYPE: ICD-10-CM

## 2018-01-25 PROCEDURE — 31237 NSL/SINS NDSC SURG BX POLYPC: CPT | Performed by: PHYSICIAN ASSISTANT

## 2018-01-25 PROCEDURE — 99213 OFFICE O/P EST LOW 20 MIN: CPT | Performed by: CLINICAL NURSE SPECIALIST

## 2018-01-25 PROCEDURE — 99024 POSTOP FOLLOW-UP VISIT: CPT | Performed by: PHYSICIAN ASSISTANT

## 2018-01-25 NOTE — PATIENT INSTRUCTIONS
Continue saline spray as needed, may restart flonase if needed.        IF YOU SMOKE OR USE TOBACCO PLEASE READ THE FOLLOWING:    Why is smoking bad for me?  Smoking increases the risk of heart disease, lung disease, vascular disease, stroke, and cancer.     If you smoke, STOP!    If you would like more information on quitting smoking, please visit the [employer] website: [email or web address]   This link will provide additional resources including the QUIT line and the Beat the Pack support groups.     For more information:    [facility]uit Now [facility]  1-800-QUIT-NOW  https://[place].quitlogix.org/en-[place]/    MyPlate from [employer]  The general, healthful diet is based on the 2010 Dietary Guidelines for Americans. The amount of food you need to eat from each food group depends on your age, sex, and level of physical activity and can be individualized by a dietitian. Go to [employer].gov for more information.  What do I need to know about the MyPlate plan?  · Enjoy your food, but eat less.  · Avoid oversized portions.  ¨ ½ of your plate should include fruits and vegetables.  ¨ ¼ of your plate should be grains.  ¨ ¼ of your plate should be protein.  Grains  · Make at least half of your grains whole grains.  · For a 2,000 calorie daily food plan, eat 6 oz every day.  · 1 oz is about 1 slice bread, 1 cup cereal, or ½ cup cooked rice, cereal, or pasta.  Vegetables  · Make half your plate fruits and vegetables.  · For a 2,000 calorie daily food plan, eat 2½ cups every day.  · 1 cup is about 1 cup raw or cooked vegetables or vegetable juice or 2 cups raw leafy greens.  Fruits  · Make half your plate fruits and vegetables.  · For a 2,000 calorie daily food plan, eat 2 cups every day.  · 1 cup is about 1 cup fruit or 100% fruit juice or ½ cup dried fruit.  Protein  · For a 2,000 calorie daily food plan, eat 5½ oz every day.  · 1 oz is about 1 oz meat, poultry, or fish, ¼ cup cooked beans,  1 egg, 1 Tbsp peanut butter, or ½ oz nuts or seeds.  Dairy  · Switch to fat-free or low-fat (1%) milk.  · For a 2,000 calorie daily food plan, eat 3 cups every day.  · 1 cup is about 1 cup milk or yogurt or soy milk (soy beverage), 1½ oz natural cheese, or 2 oz processed cheese.  Fats, Oils, and Empty Calories  · Only small amounts of oils are recommended.  · Empty calories are calories from solid fats or added sugars.  · Compare sodium in foods like soup, bread, and frozen meals. Choose the foods with lower numbers.  · Drink water instead of sugary drinks.  What foods can I eat?  Grains   Whole grains such as whole wheat, quinoa, millet, and bulgur. Bread, rolls, and pasta made from whole grains. Brown or wild rice. Hot or cold cereals made from whole grains and without added sugar.  Vegetables   All fresh vegetables, especially fresh red, dark green, or orange vegetables. Peas and beans. Low-sodium frozen or canned vegetables prepared without added salt. Low-sodium vegetable juices.  Fruits   All fresh, frozen, and dried fruits. Canned fruit packed in water or fruit juice without added sugar. Fruit juices without added sugar.  Meats and Other Protein Sources   Boiled, baked, or grilled lean meat trimmed of fat. Skinless poultry. Fresh seafood and shellfish. Canned seafood packed in water. Unsalted nuts and unsalted nut butters. Tofu. Dried beans and pea. Eggs.  Dairy   Low-fat or fat-free milk, yogurt, and cheeses.  Sweets and Desserts   Frozen desserts made from low-fat milk.  Fats and Oils   Olive, peanut, and canola oils and margarine. Salad dressing and mayonnaise made from these oils.  Other   Soups and casseroles made from allowed ingredients and without added fat or salt.  The items listed above may not be a complete list of recommended foods or beverages. Contact your dietitian for more options.   What foods are not recommended?  Grains   Sweetened, low-fiber cereals. Packaged baked goods. Snack crackers  and chips. Cheese crackers, butter crackers, and biscuits. Frozen waffles, sweet breads, doughnuts, pastries, packaged baking mixes, pancakes, cakes, and cookies.  Vegetables   Regular canned or frozen vegetables or vegetables prepared with salt. Canned tomatoes. Canned tomato sauce. Fried vegetables. Vegetables in cream sauce or cheese sauce.  Fruits   Fruits packed in syrup or made with added sugar.  Meats and Other Protein Sources   Marbled or fatty meats such as ribs. Poultry with skin. Fried meats, poultry, eggs, or fish. Sausages, hot dogs, and deli meats such as pastrami, bologna, or salami.  Dairy   Whole milk, cream, cheeses made from whole milk, sour cream. Ice cream or yogurt made from whole milk or with added sugar.  Beverages   For adults, no more than one alcoholic drink per day. Regular soft drinks or other sugary beverages. Juice drinks.  Sweets and Desserts   Sugary or fatty desserts, candy, and other sweets.  Fats and Oils   Solid shortening or partially hydrogenated oils. Solid margarine. Margarine that contains trans fats. Butter.  The items listed above may not be a complete list of foods and beverages to avoid. Contact your dietitian for more information.   This information is not intended to replace advice given to you by your health care provider. Make sure you discuss any questions you have with your health care provider.  Document Released: 01/06/2009 Document Revised: 05/25/2017 Document Reviewed: 11/26/2014  [employer] Interactive Patient Education © 2017 [employer]   Calorie Counting for Weight Loss  Calories are energy you get from the things you eat and drink. Your body uses this energy to keep you going throughout the day. The number of calories you eat affects your weight. When you eat more calories than your body needs, your body stores the extra calories as fat. When you eat fewer calories than your body needs, your body burns fat to get the energy it needs.  Calorie counting  means keeping track of how many calories you eat and drink each day. If you make sure to eat fewer calories than your body needs, you should lose weight. In order for calorie counting to work, you will need to eat the number of calories that are right for you in a day to lose a healthy amount of weight per week. A healthy amount of weight to lose per week is usually 1-2 lb (0.5-0.9 kg). A dietitian can determine how many calories you need in a day and give you suggestions on how to reach your calorie goal.   WHAT IS MY MY PLAN?  My goal is to have __________ calories per day.   If I have this many calories per day, I should lose around __________ pounds per week.  WHAT DO I NEED TO KNOW ABOUT CALORIE COUNTING?  In order to meet your daily calorie goal, you will need to:  · Find out how many calories are in each food you would like to eat. Try to do this before you eat.  · Decide how much of the food you can eat.  · Write down what you ate and how many calories it had. Doing this is called keeping a food log.  WHERE DO I FIND CALORIE INFORMATION?  The number of calories in a food can be found on a Nutrition Facts label. Note that all the information on a label is based on a specific serving of the food. If a food does not have a Nutrition Facts label, try to look up the calories online or ask your dietitian for help.  HOW DO I DECIDE HOW MUCH TO EAT?  To decide how much of the food you can eat, you will need to consider both the number of calories in one serving and the size of one serving. This information can be found on the Nutrition Facts label. If a food does not have a Nutrition Facts label, look up the information online or ask your dietitian for help.  Remember that calories are listed per serving. If you choose to have more than one serving of a food, you will have to multiply the calories per serving by the amount of servings you plan to eat. For example, the label on a package of bread might say that a  serving size is 1 slice and that there are 90 calories in a serving. If you eat 1 slice, you will have eaten 90 calories. If you eat 2 slices, you will have eaten 180 calories.  HOW DO I KEEP A FOOD LOG?  After each meal, record the following information in your food log:  · What you ate.  · How much of it you ate.  · How many calories it had.  · Then, add up your calories.  Keep your food log near you, such as in a small notebook in your pocket. Another option is to use a mobile graciela or website. Some programs will calculate calories for you and show you how many calories you have left each time you add an item to the log.  WHAT ARE SOME CALORIE COUNTING TIPS?  · Use your calories on foods and drinks that will fill you up and not leave you hungry. Some examples of this include foods like nuts and nut butters, vegetables, lean proteins, and high-fiber foods (more than 5 g fiber per serving).  · Eat nutritious foods and avoid empty calories. Empty calories are calories you get from foods or beverages that do not have many nutrients, such as candy and soda. It is better to have a nutritious high-calorie food (such as an avocado) than a food with few nutrients (such as a bag of chips).  · Know how many calories are in the foods you eat most often. This way, you do not have to look up how many calories they have each time you eat them.  · Look out for foods that may seem like low-calorie foods but are really high-calorie foods, such as baked goods, soda, and fat-free candy.  · Pay attention to calories in drinks. Drinks such as sodas, specialty coffee drinks, alcohol, and juices have a lot of calories yet do not fill you up. Choose low-calorie drinks like water and diet drinks.  · Focus your calorie counting efforts on higher calorie items. Logging the calories in a garden salad that contains only vegetables is less important than calculating the calories in a milk shake.  · Find a way of tracking calories that works for  you. Get creative. Most people who are successful find ways to keep track of how much they eat in a day, even if they do not count every calorie.  WHAT ARE SOME PORTION CONTROL TIPS?  · Know how many calories are in a serving. This will help you know how many servings of a certain food you can have.  · Use a measuring cup to measure serving sizes. This is helpful when you start out. With time, you will be able to estimate serving sizes for some foods.  · Take some time to put servings of different foods on your favorite plates, bowls, and cups so you know what a serving looks like.  · Try not to eat straight from a bag or box. Doing this can lead to overeating. Put the amount you would like to eat in a cup or on a plate to make sure you are eating the right portion.  · Use smaller plates, glasses, and bowls to prevent overeating. This is a quick and easy way to practice portion control. If your plate is smaller, less food can fit on it.  · Try not to multitask while eating, such as watching TV or using your computer. If it is time to eat, sit down at a table and enjoy your food. Doing this will help you to start recognizing when you are full. It will also make you more aware of what and how much you are eating.  HOW CAN I CALORIE COUNT WHEN EATING OUT?  · Ask for smaller portion sizes or child-sized portions.  · Consider sharing an entree and sides instead of getting your own entree.  · If you get your own entree, eat only half. Ask for a box at the beginning of your meal and put the rest of your entree in it so you are not tempted to eat it.  · Look for the calories on the menu. If calories are listed, choose the lower calorie options.  · Choose dishes that include vegetables, fruits, whole grains, low-fat dairy products, and lean protein. Focusing on smart food choices from each of the 5 food groups can help you stay on track at restaurants.  · Choose items that are boiled, broiled, grilled, or steamed.  · Choose  "water, milk, unsweetened iced tea, or other drinks without added sugars. If you want an alcoholic beverage, choose a lower calorie option. For example, a regular anai can have up to 700 calories and a glass of wine has around 150.  · Stay away from items that are buttered, battered, fried, or served with cream sauce. Items labeled \"crispy\" are usually fried, unless stated otherwise.  · Ask for dressings, sauces, and syrups on the side. These are usually very high in calories, so do not eat much of them.  · Watch out for salads. Many people think salads are a healthy option, but this is often not the case. Many salads come with march, fried chicken, lots of cheese, fried chips, and dressing. All of these items have a lot of calories. If you want a salad, choose a garden salad and ask for grilled meats or steak. Ask for the dressing on the side, or ask for olive oil and vinegar or lemon to use as dressing.  · Estimate how many servings of a food you are given. For example, a serving of cooked rice is ½ cup or about the size of half a tennis ball or one cupcake wrapper. Knowing serving sizes will help you be aware of how much food you are eating at restaurants. The list below tells you how big or small some common portion sizes are based on everyday objects.  ¨ 1 oz--4 stacked dice.  ¨ 3 oz--1 deck of cards.  ¨ 1 tsp--1 dice.  ¨ 1 Tbsp--½ a Ping-Pong ball.  ¨ 2 Tbsp--1 Ping-Pong ball.  ¨ ½ cup--1 tennis ball or 1 cupcake wrapper.  ¨ 1 cup--1 baseball.  This information is not intended to replace advice given to you by your health care provider. Make sure you discuss any questions you have with your health care provider.  Document Released: 12/18/2006 Document Revised: 01/08/2016 Document Reviewed: 10/23/2014  [employer] Interactive Patient Education © 2017 [employer]     Exercising to Lose Weight  Introduction  Exercising can help you to lose weight. In order to lose weight through exercise, you need to do " vigorous-intensity exercise. You can tell that you are exercising with vigorous intensity if you are breathing very hard and fast and cannot hold a conversation while exercising.  Moderate-intensity exercise helps to maintain your current weight. You can tell that you are exercising at a moderate level if you have a higher heart rate and faster breathing, but you are still able to hold a conversation.  How often should I exercise?  Choose an activity that you enjoy and set realistic goals. Your health care provider can help you to make an activity plan that works for you. Exercise regularly as directed by your health care provider. This may include:  · Doing resistance training twice each week, such as:  ¨ Push-ups.  ¨ Sit-ups.  ¨ Lifting weights.  ¨ Using resistance bands.  · Doing a given intensity of exercise for a given amount of time. Choose from these options:  ¨ 150 minutes of moderate-intensity exercise every week.  ¨ 75 minutes of vigorous-intensity exercise every week.  ¨ A mix of moderate-intensity and vigorous-intensity exercise every week.  Children, pregnant women, people who are out of shape, people who are overweight, and older adults may need to consult a health care provider for individual recommendations. If you have any sort of medical condition, be sure to consult your health care provider before starting a new exercise program.  What are some activities that can help me to lose weight?  · Walking at a rate of at least 4.5 miles an hour.  · Jogging or running at a rate of 5 miles per hour.  · Biking at a rate of at least 10 miles per hour.  · Lap swimming.  · Roller-skating or in-line skating.  · Cross-country skiing.  · Vigorous competitive sports, such as football, basketball, and soccer.  · Jumping rope.  · Aerobic dancing.  How can I be more active in my day-to-day activities?  · Use the stairs instead of the elevator.  · Take a walk during your lunch break.  · If you drive, park your car  farther away from work or school.  · If you take public transportation, get off one stop early and walk the rest of the way.  · Make all of your phone calls while standing up and walking around.  · Get up, stretch, and walk around every 30 minutes throughout the day.  What guidelines should I follow while exercising?  · Do not exercise so much that you hurt yourself, feel dizzy, or get very short of breath.  · Consult your health care provider prior to starting a new exercise program.  · Wear comfortable clothes and shoes with good support.  · Drink plenty of water while you exercise to prevent dehydration or heat stroke. Body water is lost during exercise and must be replaced.  · Work out until you breathe faster and your heart beats faster.  This information is not intended to replace advice given to you by your health care provider. Make sure you discuss any questions you have with your health care provider.  Document Released: 01/20/2012 Document Revised: 05/25/2017 Document Reviewed: 05/21/2015  © 2017 [employer]

## 2018-01-25 NOTE — PROGRESS NOTES
YOB: 1960  Location: [facility]  Location Address: [address] [place] [address], [facility], [address] [place] [address]  Location Phone: [phone]    Chief Complaint   Patient presents with   • Post-op     FESS       History of Present Illness  Carlos A Boyer Jr. is a 57 y.o. male.  Carlos A Boyer Jr. is here for follow up of ENT complaints status post bilateral functional endoscopic anterior ethmoidectomy with bilateral middle meatal antrostomy, septoplasty,  right kathia bullosa resection, bilateral inferior turbinate reduction via coblation on 17.  The patient has had problems with mild nasal congestion which has improved.  The symptoms are not localized to a particular location. The patient has had moderate symptoms. The symptoms have been present for the last several weeks. The symptoms are aggravated by  no identifiable factors. The symptoms are improved by no identifiable factors. Patient denies nasal drainage, postnasal drip, sinus pressure, sore throat and cough. He does have history of migraines and has been having headaches.         Past Medical History:   Diagnosis Date   • Arthritis    • Asthma    • Carotid disease, bilateral    • Chest pain    • Chronic ischemic heart disease    • Chronic sinusitis    • Kathia bullosa    • Coronary artery disease    • Deviated septum    • Diabetes mellitus    • Difficulty urinating    • Diverticulitis    • Enlarged prostate    • Fatty liver    • GERD (gastroesophageal reflux disease)    • Hyperlipidemia    • Hypertension    • Hypertrophy of nasal turbinates    • Keratoderma    • Kidney stone    • Migraine    • Murmur, heart    • Myocardial infarction    • Obesity    • PONV (postoperative nausea and vomiting)    • Psoriasis    • Seizures    • Sinus congestion    • Sleep apnea    • SOB (shortness of breath)    • Stroke        Past Surgical History:   Procedure Laterality Date   • CARDIAC CATHETERIZATION  2016    Dr. Broadbent; widely patent previously  placed stents in the left anterior descending and obstructive disease involving the diagonal branch which was treated medically   • CARDIAC CATHETERIZATION N/A 7/14/2017    Procedure: Left Heart Cath;  Surgeon: Wade Ramey MD;  Location:  [facility] CATH INVASIVE LOC[facility];  Service:    • CORONARY ANGIOPLASTY     • CORONARY STENT PLACEMENT      Pt states 5 or 6 stents   • ENDOSCOPIC FUNCTIONAL SINUS SURGERY (FESS) Bilateral 12/13/2017    Procedure: PROCEDURE PERFORMED:  Bilateral functional endoscopic anterior ethmoidectomy with bilateral middle meatal antrostomy Septoplasty Right kathia bullosa resection Bilateral inferior turbinate reduction via Coblation;  Surgeon: Mayank Ibarra MD;  Location:  [facility] OR;  Service:    • HERNIA REPAIR      x2 inguinal area   • KIDNEY STONE SURGERY     • KNEE SURGERY Right    • OTHER SURGICAL HISTORY      urolift   • THUMB AMPUTATION Left     partial   • TOE AMPUTATION Right     big         Current Outpatient Prescriptions:   •  albuterol (PROVENTIL HFA;VENTOLIN HFA) 108 (90 BASE) MCG/ACT inhaler, Inhale 2 puffs Every 6 (Six) Hours As Needed for wheezing., Disp: , Rfl:   •  aspirin 81 MG chewable tablet, Chew 81 mg Daily., Disp: , Rfl:   •  benzonatate (TESSALON PERLES) 100 MG capsule, Take 1 capsule by mouth 3 (Three) Times a Day As Needed for Cough., Disp: 90 capsule, Rfl: 0  •  Calcium Polycarbophil 625 MG chewable tablet, Chew Daily., Disp: , Rfl:   •  carboxymethylcellulose (REFRESH PLUS) 0.5 % solution, 1 drop 4 (Four) Times a Day As Needed for dry eyes., Disp: , Rfl:   •  Emollient (AQUAPHOR EX), Apply  topically Daily. To heels, Disp: , Rfl:   •  enoxaparin (LOVENOX) 120 MG/0.8ML solution syringe, Inject  under the skin Every 12 (Twelve) Hours., Disp: , Rfl:   •  FLUTICASONE PROPIONATE, NASAL, NA, 50 mcg into each nostril Daily. 2 SPRAYS IN EACH NOSTRIL, Disp: , Rfl:   •  insulin aspart (novoLOG) 100 UNIT/ML injection, Inject 25 Units under the skin 3 (Three) Times a Day  Before Meals., Disp: , Rfl:   •  insulin glargine (LANTUS) 100 UNIT/ML injection, Inject 50 Units under the skin 2 (Two) Times a Day., Disp: , Rfl:   •  isosorbide dinitrate (ISORDIL) 20 MG tablet, Take 1 tablet by mouth 2 (Two) Times a Day., Disp: 60 tablet, Rfl: 0  •  lamoTRIgine (LaMICtal) 100 MG tablet, Take 100 mg by mouth 2 (Two) Times a Day., Disp: , Rfl:   •  levETIRAcetam (KEPPRA) 500 MG tablet, Take 3 tablets in the morning and 4 tablets at bedtime (Patient taking differently: Take 3,500 mg by mouth Daily. Take 3 tablets in the morning and 4 tablets at bedtime), Disp: 215 tablet, Rfl: 5  •  lisinopril (PRINIVIL,ZESTRIL) 40 MG tablet, Take 40 mg by mouth. Take a 1/2 tab at bedtime by mouth, Disp: , Rfl:   •  metFORMIN (GLUCOPHAGE) 1000 MG tablet, Take 1,000 mg by mouth 2 (Two) Times a Day With Meals., Disp: , Rfl:   •  metoprolol tartrate (LOPRESSOR) 50 MG tablet, Take 1 tablet by mouth 2 (Two) Times a Day., Disp: 60 tablet, Rfl: 0  •  Multiple Vitamin (MULTI VITAMIN PO), Take 1 tablet by mouth Daily., Disp: , Rfl:   •  naproxen sodium (ALEVE) 220 MG tablet, Take 220 mg by mouth As Needed for Headache., Disp: , Rfl:   •  nitroglycerin (NITROSTAT) 0.4 MG SL tablet, Place 0.4 mg under the tongue Every 5 (Five) Minutes As Needed for chest pain. Take no more than 3 doses in 15 minutes., Disp: , Rfl:   •  pantoprazole (PROTONIX) 40 MG EC tablet, Take 40 mg by mouth 2 (Two) Times a Day., Disp: , Rfl:   •  psyllium (METAMUCIL) 58.6 % packet, Take  by mouth Daily. 1 TABLESPOON, Disp: , Rfl:   •  rosuvastatin (CRESTOR) 40 MG tablet, Take 20 mg by mouth Daily., Disp: , Rfl:   •  triamterene-hydrochlorothiazide (MAXZIDE) 75-50 MG per tablet, Take 1 tablet by mouth Daily., Disp: , Rfl:   •  urea (CARMOL) 40 % ointment, Apply  topically Daily., Disp: , Rfl:     Flagyl [metronidazole]; Atorvastatin; and Ciprofloxacin    Family History   Problem Relation Age of Onset   • Heart disease Father    • Heart disease Maternal  Grandmother        Social History     Social History   • Marital status:      Spouse name: N/A   • Number of children: N/A   • Years of education: N/A     Occupational History   • Not on file.     Social History Main Topics   • Smoking status: Former Smoker     Quit date: 1993   • Smokeless tobacco: Former User     Types: Chew     Quit date: 2009      Comment: Pt quit 25 years ago   • Alcohol use No      Comment: quit 2009   • Drug use: No   • Sexual activity: Defer     Other Topics Concern   • Not on file     Social History Narrative       Review of Systems   Constitutional: Negative.    HENT: Positive for congestion.    Eyes: Negative.    Respiratory: Negative.    Cardiovascular: Negative.    Gastrointestinal: Negative.    Endocrine: Negative.    Genitourinary: Negative.    Musculoskeletal: Negative.    Skin: Negative.    Allergic/Immunologic: Negative.    Neurological: Positive for headaches.   Psychiatric/Behavioral: Negative.        Vitals:    01/25/18 1436   BP: 130/88   Temp: 98.2 °F (36.8 °C)       Objective     Physical Exam  CONSTITUTIONAL: well nourished, alert, oriented, in no acute distress     COMMUNICATION AND VOICE: able to communicate normally, normal voice quality    HEAD: normocephalic, no lesions, atraumatic, no tenderness, no masses     FACE: appearance normal, no lesions, no tenderness, no deformities, facial motion symmetric    EYES: ocular motility normal, eyelids normal, orbits normal, no proptosis, conjunctiva normal , pupils equal, round     EARS:  Hearing: response to conversational voice normal bilaterally   External Ears: auricles without lesions    NOSE:  External Nose: structure normal, no tenderness on palpation, no nasal discharge, no lesions, no evidence of trauma, nostrils patent   Intranasal Exam:   Procedure Note    Pre-operative Diagnosis:  s/p recent sinonasal surgery    Post-operative Diagnosis: same    Anesthesia: topical 4% tetracaine and oxymetazoline  mix    Endoscopy Type:  Nasal Endoscopy with debridement    Procedure Details:    After topical anesthesia and decongestion, the patient was placed in the sitting position.  An endoscope was passed along the left nasal floor to the nasopharynx.  It was then passed into the region of the middle meatus, middle turbinate, and the sphenoethmoid region. Debridement of postoperative crusting and removal of old blood and retained secretions was performed with forceps and suctioning. An identical procedure was performed on the right side.  The following findings were noted:    Findings:  Intranasal Exam: nasal mucosa normal, vestibule within normal limits, inferior turbinate normal, no polyps seen, nasal septum non-obstructing,    Condition:  Stable.  Patient tolerated procedure well.    Complications:  None        ORAL:  Lips: upper and lower lips without lesion     NECK: neck appearance normal    CHEST/RESPIRATORY: respiratory effort normal, normal breath sounds     CARDIOVASCULAR: rate and rhythm normal, extremities without cyanosis or edema      NEUROLOGIC/PSYCHIATRIC: oriented to time, place and person, mood normal, affect appropriate, CN II-XII intact grossly    Assessment/Plan   Problems Addressed this Visit        Other    S/P FESS (functional endoscopic sinus surgery) - Primary        * Surgery not found *  No orders of the defined types were placed in this encounter.    Return in about 3 months (around 4/25/2018) for Recheck sinus.       Patient Instructions   Continue saline spray as needed, may restart flonase if needed.        IF YOU SMOKE OR USE TOBACCO PLEASE READ THE FOLLOWING:    Why is smoking bad for me?  Smoking increases the risk of heart disease, lung disease, vascular disease, stroke, and cancer.     If you smoke, STOP!    If you would like more information on quitting smoking, please visit the [employer] website: [email or web address]/[email or web address]   This link will  provide additional resources including the QUIT line and the Beat the Pack support groups.     For more information:    Quit Now [place]  1-800-QUIT-NOW  https://[email or web address].org/en-US/    MyPlate from [employer]  The general, healthful diet is based on the 2010 Dietary Guidelines for Americans. The amount of food you need to eat from each food group depends on your age, sex, and level of physical activity and can be individualized by a dietitian. Go to [employer]ormMyPlate.gov for more information.  What do I need to know about the MyPlate plan?  · Enjoy your food, but eat less.  · Avoid oversized portions.  ¨ ½ of your plate should include fruits and vegetables.  ¨ ¼ of your plate should be grains.  ¨ ¼ of your plate should be protein.  Grains  · Make at least half of your grains whole grains.  · For a 2,000 calorie daily food plan, eat 6 oz every day.  · 1 oz is about 1 slice bread, 1 cup cereal, or ½ cup cooked rice, cereal, or pasta.  Vegetables  · Make half your plate fruits and vegetables.  · For a 2,000 calorie daily food plan, eat 2½ cups every day.  · 1 cup is about 1 cup raw or cooked vegetables or vegetable juice or 2 cups raw leafy greens.  Fruits  · Make half your plate fruits and vegetables.  · For a 2,000 calorie daily food plan, eat 2 cups every day.  · 1 cup is about 1 cup fruit or 100% fruit juice or ½ cup dried fruit.  Protein  · For a 2,000 calorie daily food plan, eat 5½ oz every day.  · 1 oz is about 1 oz meat, poultry, or fish, ¼ cup cooked beans, 1 egg, 1 Tbsp peanut butter, or ½ oz nuts or seeds.  Dairy  · Switch to fat-free or low-fat (1%) milk.  · For a 2,000 calorie daily food plan, eat 3 cups every day.  · 1 cup is about 1 cup milk or yogurt or soy milk (soy beverage), 1½ oz natural cheese, or 2 oz processed cheese.  Fats, Oils, and Empty Calories  · Only small amounts of oils are recommended.  · Empty calories are calories from solid fats or added sugars.  · Compare sodium in foods like  soup, bread, and frozen meals. Choose the foods with lower numbers.  · Drink water instead of sugary drinks.  What foods can I eat?  Grains   Whole grains such as whole wheat, quinoa, millet, and bulgur. Bread, rolls, and pasta made from whole grains. Brown or wild rice. Hot or cold cereals made from whole grains and without added sugar.  Vegetables   All fresh vegetables, especially fresh red, dark green, or orange vegetables. Peas and beans. Low-sodium frozen or canned vegetables prepared without added salt. Low-sodium vegetable juices.  Fruits   All fresh, frozen, and dried fruits. Canned fruit packed in water or fruit juice without added sugar. Fruit juices without added sugar.  Meats and Other Protein Sources   Boiled, baked, or grilled lean meat trimmed of fat. Skinless poultry. Fresh seafood and shellfish. Canned seafood packed in water. Unsalted nuts and unsalted nut butters. Tofu. Dried beans and pea. Eggs.  Dairy   Low-fat or fat-free milk, yogurt, and cheeses.  Sweets and Desserts   Frozen desserts made from low-fat milk.  Fats and Oils   Olive, peanut, and canola oils and margarine. Salad dressing and mayonnaise made from these oils.  Other   Soups and casseroles made from allowed ingredients and without added fat or salt.  The items listed above may not be a complete list of recommended foods or beverages. Contact your dietitian for more options.   What foods are not recommended?  Grains   Sweetened, low-fiber cereals. Packaged baked goods. Snack crackers and chips. Cheese crackers, butter crackers, and biscuits. Frozen waffles, sweet breads, doughnuts, pastries, packaged baking mixes, pancakes, cakes, and cookies.  Vegetables   Regular canned or frozen vegetables or vegetables prepared with salt. Canned tomatoes. Canned tomato sauce. Fried vegetables. Vegetables in cream sauce or cheese sauce.  Fruits   Fruits packed in syrup or made with added sugar.  Meats and Other Protein Sources   Marbled or fatty  meats such as ribs. Poultry with skin. Fried meats, poultry, eggs, or fish. Sausages, hot dogs, and deli meats such as pastrami, bologna, or salami.  Dairy   Whole milk, cream, cheeses made from whole milk, sour cream. Ice cream or yogurt made from whole milk or with added sugar.  Beverages   For adults, no more than one alcoholic drink per day. Regular soft drinks or other sugary beverages. Juice drinks.  Sweets and Desserts   Sugary or fatty desserts, candy, and other sweets.  Fats and Oils   Solid shortening or partially hydrogenated oils. Solid margarine. Margarine that contains trans fats. Butter.  The items listed above may not be a complete list of foods and beverages to avoid. Contact your dietitian for more information.   This information is not intended to replace advice given to you by your health care provider. Make sure you discuss any questions you have with your health care provider.  Document Released: 01/06/2009 Document Revised: 05/25/2017 Document Reviewed: 11/26/2014  [employer] Interactive Patient Education © 2017 [employer]   Calorie Counting for Weight Loss  Calories are energy you get from the things you eat and drink. Your body uses this energy to keep you going throughout the day. The number of calories you eat affects your weight. When you eat more calories than your body needs, your body stores the extra calories as fat. When you eat fewer calories than your body needs, your body burns fat to get the energy it needs.  Calorie counting means keeping track of how many calories you eat and drink each day. If you make sure to eat fewer calories than your body needs, you should lose weight. In order for calorie counting to work, you will need to eat the number of calories that are right for you in a day to lose a healthy amount of weight per week. A healthy amount of weight to lose per week is usually 1-2 lb (0.5-0.9 kg). A dietitian can determine how many calories you need in a day and give  you suggestions on how to reach your calorie goal.   WHAT IS MY MY PLAN?  My goal is to have __________ calories per day.   If I have this many calories per day, I should lose around __________ pounds per week.  WHAT DO I NEED TO KNOW ABOUT CALORIE COUNTING?  In order to meet your daily calorie goal, you will need to:  · Find out how many calories are in each food you would like to eat. Try to do this before you eat.  · Decide how much of the food you can eat.  · Write down what you ate and how many calories it had. Doing this is called keeping a food log.  WHERE DO I FIND CALORIE INFORMATION?  The number of calories in a food can be found on a Nutrition Facts label. Note that all the information on a label is based on a specific serving of the food. If a food does not have a Nutrition Facts label, try to look up the calories online or ask your dietitian for help.  HOW DO I DECIDE HOW MUCH TO EAT?  To decide how much of the food you can eat, you will need to consider both the number of calories in one serving and the size of one serving. This information can be found on the Nutrition Facts label. If a food does not have a Nutrition Facts label, look up the information online or ask your dietitian for help.  Remember that calories are listed per serving. If you choose to have more than one serving of a food, you will have to multiply the calories per serving by the amount of servings you plan to eat. For example, the label on a package of bread might say that a serving size is 1 slice and that there are 90 calories in a serving. If you eat 1 slice, you will have eaten 90 calories. If you eat 2 slices, you will have eaten 180 calories.  HOW DO I KEEP A FOOD LOG?  After each meal, record the following information in your food log:  · What you ate.  · How much of it you ate.  · How many calories it had.  · Then, add up your calories.  Keep your food log near you, such as in a small notebook in your pocket. Another  option is to use a mobile graciela or website. Some programs will calculate calories for you and show you how many calories you have left each time you add an item to the log.  WHAT ARE SOME CALORIE COUNTING TIPS?  · Use your calories on foods and drinks that will fill you up and not leave you hungry. Some examples of this include foods like nuts and nut butters, vegetables, lean proteins, and high-fiber foods (more than 5 g fiber per serving).  · Eat nutritious foods and avoid empty calories. Empty calories are calories you get from foods or beverages that do not have many nutrients, such as candy and soda. It is better to have a nutritious high-calorie food (such as an avocado) than a food with few nutrients (such as a bag of chips).  · Know how many calories are in the foods you eat most often. This way, you do not have to look up how many calories they have each time you eat them.  · Look out for foods that may seem like low-calorie foods but are really high-calorie foods, such as baked goods, soda, and fat-free candy.  · Pay attention to calories in drinks. Drinks such as sodas, specialty coffee drinks, alcohol, and juices have a lot of calories yet do not fill you up. Choose low-calorie drinks like water and diet drinks.  · Focus your calorie counting efforts on higher calorie items. Logging the calories in a garden salad that contains only vegetables is less important than calculating the calories in a milk shake.  · Find a way of tracking calories that works for you. Get creative. Most people who are successful find ways to keep track of how much they eat in a day, even if they do not count every calorie.  WHAT ARE SOME PORTION CONTROL TIPS?  · Know how many calories are in a serving. This will help you know how many servings of a certain food you can have.  · Use a measuring cup to measure serving sizes. This is helpful when you start out. With time, you will be able to estimate serving sizes for some  "foods.  · Take some time to put servings of different foods on your favorite plates, bowls, and cups so you know what a serving looks like.  · Try not to eat straight from a bag or box. Doing this can lead to overeating. Put the amount you would like to eat in a cup or on a plate to make sure you are eating the right portion.  · Use smaller plates, glasses, and bowls to prevent overeating. This is a quick and easy way to practice portion control. If your plate is smaller, less food can fit on it.  · Try not to multitask while eating, such as watching TV or using your computer. If it is time to eat, sit down at a table and enjoy your food. Doing this will help you to start recognizing when you are full. It will also make you more aware of what and how much you are eating.  HOW CAN I CALORIE COUNT WHEN EATING OUT?  · Ask for smaller portion sizes or child-sized portions.  · Consider sharing an entree and sides instead of getting your own entree.  · If you get your own entree, eat only half. Ask for a box at the beginning of your meal and put the rest of your entree in it so you are not tempted to eat it.  · Look for the calories on the menu. If calories are listed, choose the lower calorie options.  · Choose dishes that include vegetables, fruits, whole grains, low-fat dairy products, and lean protein. Focusing on smart food choices from each of the 5 food groups can help you stay on track at restaurants.  · Choose items that are boiled, broiled, grilled, or steamed.  · Choose water, milk, unsweetened iced tea, or other drinks without added sugars. If you want an alcoholic beverage, choose a lower calorie option. For example, a regular anai can have up to 700 calories and a glass of wine has around 150.  · Stay away from items that are buttered, battered, fried, or served with cream sauce. Items labeled \"crispy\" are usually fried, unless stated otherwise.  · Ask for dressings, sauces, and syrups on the side. " These are usually very high in calories, so do not eat much of them.  · Watch out for salads. Many people think salads are a healthy option, but this is often not the case. Many salads come with march, fried chicken, lots of cheese, fried chips, and dressing. All of these items have a lot of calories. If you want a salad, choose a garden salad and ask for grilled meats or steak. Ask for the dressing on the side, or ask for olive oil and vinegar or lemon to use as dressing.  · Estimate how many servings of a food you are given. For example, a serving of cooked rice is ½ cup or about the size of half a tennis ball or one cupcake wrapper. Knowing serving sizes will help you be aware of how much food you are eating at restaurants. The list below tells you how big or small some common portion sizes are based on everyday objects.  ¨ 1 oz--4 stacked dice.  ¨ 3 oz--1 deck of cards.  ¨ 1 tsp--1 dice.  ¨ 1 Tbsp--½ a Ping-Pong ball.  ¨ 2 Tbsp--1 Ping-Pong ball.  ¨ ½ cup--1 tennis ball or 1 cupcake wrapper.  ¨ 1 cup--1 baseball.  This information is not intended to replace advice given to you by your health care provider. Make sure you discuss any questions you have with your health care provider.  Document Released: 12/18/2006 Document Revised: 01/08/2016 Document Reviewed: 10/23/2014  [employer] Interactive Patient Education © 2017 [employer]     Exercising to Lose Weight  Introduction  Exercising can help you to lose weight. In order to lose weight through exercise, you need to do vigorous-intensity exercise. You can tell that you are exercising with vigorous intensity if you are breathing very hard and fast and cannot hold a conversation while exercising.  Moderate-intensity exercise helps to maintain your current weight. You can tell that you are exercising at a moderate level if you have a higher heart rate and faster breathing, but you are still able to hold a conversation.  How often should I exercise?  Choose an  activity that you enjoy and set realistic goals. Your health care provider can help you to make an activity plan that works for you. Exercise regularly as directed by your health care provider. This may include:  · Doing resistance training twice each week, such as:  ¨ Push-ups.  ¨ Sit-ups.  ¨ Lifting weights.  ¨ Using resistance bands.  · Doing a given intensity of exercise for a given amount of time. Choose from these options:  ¨ 150 minutes of moderate-intensity exercise every week.  ¨ 75 minutes of vigorous-intensity exercise every week.  ¨ A mix of moderate-intensity and vigorous-intensity exercise every week.  Children, pregnant women, people who are out of shape, people who are overweight, and older adults may need to consult a health care provider for individual recommendations. If you have any sort of medical condition, be sure to consult your health care provider before starting a new exercise program.  What are some activities that can help me to lose weight?  · Walking at a rate of at least 4.5 miles an hour.  · Jogging or running at a rate of 5 miles per hour.  · Biking at a rate of at least 10 miles per hour.  · Lap swimming.  · Roller-skating or in-line skating.  · Cross-country skiing.  · Vigorous competitive sports, such as football, basketball, and soccer.  · Jumping rope.  · Aerobic dancing.  How can I be more active in my day-to-day activities?  · Use the stairs instead of the elevator.  · Take a walk during your lunch break.  · If you drive, park your car farther away from work or school.  · If you take public transportation, get off one stop early and walk the rest of the way.  · Make all of your phone calls while standing up and walking around.  · Get up, stretch, and walk around every 30 minutes throughout the day.  What guidelines should I follow while exercising?  · Do not exercise so much that you hurt yourself, feel dizzy, or get very short of breath.  · Consult your health care provider  prior to starting a new exercise program.  · Wear comfortable clothes and shoes with good support.  · Drink plenty of water while you exercise to prevent dehydration or heat stroke. Body water is lost during exercise and must be replaced.  · Work out until you breathe faster and your heart beats faster.  This information is not intended to replace advice given to you by your health care provider. Make sure you discuss any questions you have with your health care provider.  Document Released: 01/20/2012 Document Revised: 05/25/2017 Document Reviewed: 05/21/2015  © 2017 [employer]

## 2018-01-25 NOTE — PROGRESS NOTES
Subjective     Chief Complaint   Patient presents with   • Seizures         Carlos A Boyer Jr. is a 57 y.o. male ambidextrous retiree, .  He is here today for follow up for seizure and syncopal events. He was last seen 1/9/18. Patient was having coughing spells followed by loss of consciousness with tremors. Patient also has episodes of light headedness with position changes. He did have syncopal episode after having a treatment in ENT office 1/8/18. Please refer to 1/9/18 notes for details. Patient was given rx for tessalon pearls but states did not fill. He did talk with his pharmacist and did take OTC medication and cough resolved 3 days later. Since last visit, patient states he has not had more spells and is at his baseline. Patient did have EEG 1/23/18 and I have reviewed results with the patient. Also since last visit, patient has been begin wearing CPAP at longer intervals. As you recall patient had nasal surgery 11/2017 that prevented him from using.     As you recall, He is a prior patient of Dr. REJI Roa.  He had an extensive work up at [facility] and found to have seizure disorder. He has been taking Keppra and Lamictal ever since. Patient also has hx of left hemispheric stroke with no residual.    Seizures    This is a chronic (2012, would occur 2-4 times a month and last up to 10 mintues) problem. Episode onset: last episode was about April 2017. Duration: last about 5 minutes now with medications. Associated symptoms include headaches. Pertinent negatives include no confusion, no visual disturbance, no chest pain, no nausea, no vomiting and no diarrhea. Characteristics include rhythmic jerking. Characteristics do not include bowel incontinence, bladder incontinence, loss of consciousness, bit tongue or apnea. never had LOC, would have tremoring from inside out, and would have tremor in both arms and rarely in legs. would have staring , can hear Associated symptoms  comments: fatigue.   Syncope   This is a new problem. The current episode started in the past 7 days. Episode frequency: at least 3 times since 1/3/17. The problem has been resolved. He lost consciousness for a period of 1 to 5 minutes (no more than 3 minutes). Exacerbated by: severe forceful cough. Associated symptoms include back pain and headaches. Pertinent negatives include no bladder incontinence, bowel incontinence, chest pain, confusion, dizziness, fever, nausea, vomiting or weakness. He has tried nothing for the symptoms. The treatment provided no relief. His past medical history is significant for HTN and seizures. leslye and has not used CPAP because of nasal surgery.        Current Outpatient Prescriptions   Medication Sig Dispense Refill   • albuterol (PROVENTIL HFA;VENTOLIN HFA) 108 (90 BASE) MCG/ACT inhaler Inhale 2 puffs Every 6 (Six) Hours As Needed for wheezing.     • aspirin 81 MG chewable tablet Chew 81 mg Daily.     • benzonatate (TESSALON PERLES) 100 MG capsule Take 1 capsule by mouth 3 (Three) Times a Day As Needed for Cough. 90 capsule 0   • Calcium Polycarbophil 625 MG chewable tablet Chew Daily.     • carboxymethylcellulose (REFRESH PLUS) 0.5 % solution 1 drop 4 (Four) Times a Day As Needed for dry eyes.     • Emollient (AQUAPHOR EX) Apply  topically Daily. To heels     • enoxaparin (LOVENOX) 120 MG/0.8ML solution syringe Inject  under the skin Every 12 (Twelve) Hours.     • FLUTICASONE PROPIONATE, NASAL, NA 50 mcg into each nostril Daily. 2 SPRAYS IN EACH NOSTRIL     • insulin aspart (novoLOG) 100 UNIT/ML injection Inject 25 Units under the skin 3 (Three) Times a Day Before Meals.     • insulin glargine (LANTUS) 100 UNIT/ML injection Inject 50 Units under the skin 2 (Two) Times a Day.     • isosorbide dinitrate (ISORDIL) 20 MG tablet Take 1 tablet by mouth 2 (Two) Times a Day. 60 tablet 0   • lamoTRIgine (LaMICtal) 100 MG tablet Take 100 mg by mouth 2 (Two) Times a Day.     • levETIRAcetam  (KEPPRA) 500 MG tablet Take 3 tablets in the morning and 4 tablets at bedtime (Patient taking differently: Take 3,500 mg by mouth Daily. Take 3 tablets in the morning and 4 tablets at bedtime) 215 tablet 5   • lisinopril (PRINIVIL,ZESTRIL) 40 MG tablet Take 40 mg by mouth. Take a 1/2 tab at bedtime by mouth     • metFORMIN (GLUCOPHAGE) 1000 MG tablet Take 1,000 mg by mouth 2 (Two) Times a Day With Meals.     • metoprolol tartrate (LOPRESSOR) 50 MG tablet Take 1 tablet by mouth 2 (Two) Times a Day. 60 tablet 0   • Multiple Vitamin (MULTI VITAMIN PO) Take 1 tablet by mouth Daily.     • naproxen sodium (ALEVE) 220 MG tablet Take 220 mg by mouth As Needed for Headache.     • nitroglycerin (NITROSTAT) 0.4 MG SL tablet Place 0.4 mg under the tongue Every 5 (Five) Minutes As Needed for chest pain. Take no more than 3 doses in 15 minutes.     • pantoprazole (PROTONIX) 40 MG EC tablet Take 40 mg by mouth 2 (Two) Times a Day.     • psyllium (METAMUCIL) 58.6 % packet Take  by mouth Daily. 1 TABLESPOON     • rosuvastatin (CRESTOR) 40 MG tablet Take 20 mg by mouth Daily.     • triamterene-hydrochlorothiazide (MAXZIDE) 75-50 MG per tablet Take 1 tablet by mouth Daily.     • urea (CARMOL) 40 % ointment Apply  topically Daily.       No current facility-administered medications for this visit.        Past Medical History:   Diagnosis Date   • Arthritis    • Asthma    • Carotid disease, bilateral    • Chest pain    • Chronic ischemic heart disease    • Chronic sinusitis    • Maribell bullosa    • Coronary artery disease    • Deviated septum    • Diabetes mellitus    • Difficulty urinating    • Diverticulitis    • Enlarged prostate    • Fatty liver    • GERD (gastroesophageal reflux disease)    • Hyperlipidemia    • Hypertension    • Hypertrophy of nasal turbinates    • Keratoderma    • Kidney stone    • Migraine    • Murmur, heart    • Myocardial infarction    • Obesity    • PONV (postoperative nausea and vomiting)    • Psoriasis    •  Seizures    • Sinus congestion    • Sleep apnea    • SOB (shortness of breath)    • Stroke        Past Surgical History:   Procedure Laterality Date   • CARDIAC CATHETERIZATION  01/2016    Dr. Broadbent; widely patent previously placed stents in the left anterior descending and obstructive disease involving the diagonal branch which was treated medically   • CARDIAC CATHETERIZATION N/A 7/14/2017    Procedure: Left Heart Cath;  Surgeon: Wade Ramey MD;  Location:  [facility] CATH INVASIVE LOCATION;  Service:    • CORONARY ANGIOPLASTY     • CORONARY STENT PLACEMENT      Pt states 5 or 6 stents   • ENDOSCOPIC FUNCTIONAL SINUS SURGERY (FESS) Bilateral 12/13/2017    Procedure: PROCEDURE PERFORMED:  Bilateral functional endoscopic anterior ethmoidectomy with bilateral middle meatal antrostomy Septoplasty Right kathia bullosa resection Bilateral inferior turbinate reduction via Coblation;  Surgeon: Mayank Ibarra MD;  Location:  [facility] OR;  Service:    • HERNIA REPAIR      x2 inguinal area   • KIDNEY STONE SURGERY     • KNEE SURGERY Right    • OTHER SURGICAL HISTORY      urolift   • THUMB AMPUTATION Left     partial   • TOE AMPUTATION Right     big       family history includes Heart disease in his father and maternal grandmother.    Social History   Substance Use Topics   • Smoking status: Former Smoker     Quit date: 1993   • Smokeless tobacco: Former User     Types: Chew     Quit date: 2009      Comment: Pt quit 25 years ago   • Alcohol use No      Comment: quit 2009       Review of Systems   Constitutional: Negative.  Negative for fatigue and fever.   HENT: Positive for sinus pressure. Negative for hearing loss and postnasal drip.    Eyes: Negative.  Negative for visual disturbance.   Respiratory: Negative.  Negative for apnea, chest tightness and shortness of breath.    Cardiovascular: Positive for syncope. Negative for chest pain.   Gastrointestinal: Negative.  Negative for bowel incontinence, constipation, diarrhea,  "nausea and vomiting.   Endocrine: Negative.    Genitourinary: Negative.  Negative for bladder incontinence, dysuria and frequency.   Musculoskeletal: Positive for back pain. Negative for arthralgias, gait problem and myalgias.   Skin: Negative.    Allergic/Immunologic: Negative.    Neurological: Positive for seizures, syncope and headaches. Negative for dizziness, tremors, loss of consciousness and weakness.   Hematological: Negative.  Negative for adenopathy.   Psychiatric/Behavioral: Negative.  Negative for agitation, confusion and hallucinations.   All other systems reviewed and are negative.      Objective     /80  Pulse 74  Ht 182.9 cm (72\")  Wt 112 kg (248 lb)  BMI 33.63 kg/m2, Body mass index is 33.63 kg/(m^2).    Physical Exam   Constitutional: He is oriented to person, place, and time. He appears well-developed and well-nourished. He is cooperative.   HENT:   Head: Normocephalic.   Right Ear: Hearing and external ear normal.   Left Ear: Hearing and external ear normal.   Nose: Nose normal.   Mouth/Throat: Oropharynx is clear and moist.   Eyes: Conjunctivae, EOM and lids are normal. Pupils are equal, round, and reactive to light.   Neck: Normal range of motion. Neck supple. Carotid bruit is not present.   Cardiovascular: Normal rate, regular rhythm, S1 normal, S2 normal and normal heart sounds.    No murmur heard.  Pulmonary/Chest: Effort normal and breath sounds normal.   Frequent severe forceful cough   Abdominal: Soft. Bowel sounds are normal.   Musculoskeletal: Normal range of motion.   Neurological: He is alert and oriented to person, place, and time. He has normal strength and normal reflexes. He displays no tremor. No cranial nerve deficit or sensory deficit. He exhibits normal muscle tone. He displays a negative Romberg sign. Coordination and gait normal. GCS eye subscore is 4. GCS verbal subscore is 5. GCS motor subscore is 6.   Reflex Scores:       Tricep reflexes are 2+ on the right side " and 2+ on the left side.       Bicep reflexes are 2+ on the right side and 2+ on the left side.       Brachioradialis reflexes are 2+ on the right side and 2+ on the left side.       Patellar reflexes are 2+ on the right side and 2+ on the left side.       Achilles reflexes are 2+ on the right side and 2+ on the left side.  Skin: Skin is warm and dry.   Psychiatric: He has a normal mood and affect. His speech is normal and behavior is normal. Cognition and memory are normal.   Nursing note and vitals reviewed.      Results for orders placed or performed during the hospital encounter of 01/08/18   Comprehensive Metabolic Panel   Result Value Ref Range    Glucose 92 70 - 100 mg/dL    BUN 14 5 - 21 mg/dL    Creatinine 1.05 0.50 - 1.40 mg/dL    Sodium 144 135 - 145 mmol/L    Potassium 4.2 3.5 - 5.3 mmol/L    Chloride 101 98 - 110 mmol/L    CO2 30.0 24.0 - 31.0 mmol/L    Calcium 9.6 8.4 - 10.4 mg/dL    Total Protein 7.6 6.3 - 8.7 g/dL    Albumin 4.30 3.50 - 5.00 g/dL    ALT (SGPT) 59 (H) 0 - 54 U/L    AST (SGOT) 38 7 - 45 U/L    Alkaline Phosphatase 99 24 - 120 U/L    Total Bilirubin 0.8 0.1 - 1.0 mg/dL    eGFR Non African Amer 72 >60 mL/min/1.73    Globulin 3.3 gm/dL    A/G Ratio 1.3 1.1 - 2.5 g/dL    BUN/Creatinine Ratio 13.3 7.0 - 25.0    Anion Gap 13.0 4.0 - 13.0 mmol/L   CBC Auto Differential   Result Value Ref Range    WBC 5.51 4.80 - 10.80 10*3/mm3    RBC 4.55 (L) 4.80 - 5.90 10*6/mm3    Hemoglobin 13.2 (L) 14.0 - 18.0 g/dL    Hematocrit 39.9 (L) 40.0 - 52.0 %    MCV 87.7 82.0 - 95.0 fL    MCH 29.0 28.0 - 32.0 pg    MCHC 33.1 33.0 - 36.0 g/dL    RDW 12.5 12.0 - 15.0 %    RDW-SD 39.8 (L) 40.0 - 54.0 fl    MPV 11.5 6.0 - 12.0 fL    Platelets 133 130 - 400 10*3/mm3    Neutrophil % 63.0 39.0 - 78.0 %    Lymphocyte % 26.5 15.0 - 45.0 %    Monocyte % 7.1 4.0 - 12.0 %    Eosinophil % 2.7 0.0 - 4.0 %    Basophil % 0.5 0.0 - 2.0 %    Immature Grans % 0.2 0.0 - 5.0 %    Neutrophils, Absolute 3.47 1.87 - 8.40 10*3/mm3     Lymphocytes, Absolute 1.46 0.72 - 4.86 10*3/mm3    Monocytes, Absolute 0.39 0.19 - 1.30 10*3/mm3    Eosinophils, Absolute 0.15 0.00 - 0.70 10*3/mm3    Basophils, Absolute 0.03 0.00 - 0.20 10*3/mm3    Immature Grans, Absolute 0.01 0.00 - 0.03 10*3/mm3    nRBC 0.0 0.0 - 0.0 /100 WBC   Scan Slide   Result Value Ref Range    RBC Morphology Normal Normal    WBC Morphology Normal Normal    Platelet Morphology Normal Normal      EEG: RESULTS:  Background activity the EEG is somewhat difficult to assess secondary to beta activities probably secondary to medication effect but is likely 8-9 Hz in the posterior head region bilaterally.  Patient gets drowsy with further generalized slowing the background activity and symmetrical spindles noted.  Further generalized slowing of the background activity with  vertex activity.  Hyperventilation not done.  Photic stimulation no change.  There is a questionable driving response at certain frequencies.  Movement artifact somewhat obscures the background at times as well.     IMPRESSION:  Normal EEG awake and asleep with prominent artifact noted as above.  If seizures are considered more prolonged monitoring may be of benefit.           ASSESSMENT/PLAN    Diagnoses and all orders for this visit:    Seizure disorder    Hypotension, unspecified hypotension type    Syncope and collapse    Essential hypertension    Carotid disease, bilateral    Mixed hyperlipidemia    Chronic left arterial ischemic stroke, ICA (internal carotid artery)    HOA on CPAP    Type 2 diabetes mellitus without complication, with long-term current use of insulin    MEDICAL DECISION MAKIN. Patient now able to tolerate CPAP in short intervals.  2. EEG 18 was negative and patient reports no further spells and coughing spells have ceased. Uncertain etiology of spells but may have been cough induced syncopevs breakthrough seizure vs orthostatic hypotension vs vasovagal syncope vs HOA, not wearing CPAP.   3.  Continue with Keppra 1500 mg in AM and 2000 mg in PM  4. Continue with lamictal 200 mg BID per PCP  5.  bp managed by PCP  6. Blood glucose managed by PCP and A1C goal less than 7  7. Statin per PCP for LDL goal less than 70.  8. Continue with ASA for secondary stroke prevention  9. Patient is counseled on stroke signs and symptoms using FAST and Time Saved is Brain Saved.  10. Discussed secondary stroke prevention to include a systolic blood pressure goal of less than 140, LDL goal less than 70, and 30-40 minutes of heart rate elevating exercise 3-4 times per week. Continue antiplatelet.   11.Seizure precautions were discussed to include no tub baths, no swimming, avoiding lack of sleep, and avoiding known triggers. Education given of things that may contribute to a seizure to include, but not limited to: stressful situations, fever, fatigue, lack of sleep, low blood sugar, hyperventilation, flashing lights, and caffeine. Instructions given to take seizure medications as prescribed. Education given to family member on what to do during a seizure and care following the seizure. Education given to contact this office prior to stopping or changing any medications.  12. Former smoker, currently not using tobacco  13. Patient cleared to drive  14. Patient's BMI is above normal parameters. Follow-up plan includes:  no follow-up required.  15. Patient did have MRI brain 12/2017 and no interval change on [facility].         allergies and all known medications/prescriptions have been reviewed using resources available on this encounter.    Return in about 4 months (around 5/25/2018).        TEENA Win

## 2018-03-15 ENCOUNTER — HOSPITAL ENCOUNTER (OUTPATIENT)
Facility: HOSPITAL | Age: 58
Setting detail: OBSERVATION
Discharge: HOME | End: 2018-03-17
Attending: INTERNAL MEDICINE | Admitting: INTERNAL MEDICINE

## 2018-03-15 ENCOUNTER — APPOINTMENT (OUTPATIENT)
Dept: CT IMAGING | Facility: HOSPITAL | Age: 58
End: 2018-03-15

## 2018-03-15 ENCOUNTER — APPOINTMENT (OUTPATIENT)
Dept: GENERAL RADIOLOGY | Facility: HOSPITAL | Age: 58
End: 2018-03-15

## 2018-03-15 DIAGNOSIS — R55 SYNCOPE AND COLLAPSE: Primary | ICD-10-CM

## 2018-03-15 LAB
ALBUMIN SERPL-MCNC: 4.6 G/DL (ref 3.5–5)
ALBUMIN/GLOB SERPL: 1.2 G/DL (ref 1.1–2.5)
ALP SERPL-CCNC: 85 U/L (ref 24–120)
ALT SERPL W P-5'-P-CCNC: 64 U/L (ref 0–54)
AMYLASE SERPL-CCNC: 65 U/L (ref 30–110)
ANION GAP SERPL CALCULATED.3IONS-SCNC: 20 MMOL/L (ref 4–13)
APTT PPP: 26.9 SECONDS (ref 24.1–34.8)
ARTERIAL PATENCY WRIST A: ABNORMAL
ARTICHOKE IGE QN: 69 MG/DL (ref 0–99)
AST SERPL-CCNC: 44 U/L (ref 7–45)
ATMOSPHERIC PRESS: 748 MMHG
BACTERIA UR QL AUTO: ABNORMAL /HPF
BASE EXCESS BLDA CALC-SCNC: -0.6 MMOL/L (ref 0–2)
BASOPHILS # BLD AUTO: 0.03 10*3/MM3 (ref 0–0.2)
BASOPHILS NFR BLD AUTO: 0.5 % (ref 0–2)
BDY SITE: ABNORMAL
BILIRUB SERPL-MCNC: 1.4 MG/DL (ref 0.1–1)
BILIRUB UR QL STRIP: NEGATIVE
BODY TEMPERATURE: 37 C
BUN BLD-MCNC: 15 MG/DL (ref 5–21)
BUN/CREAT SERPL: 14 (ref 7–25)
CALCIUM SPEC-SCNC: 9.9 MG/DL (ref 8.4–10.4)
CHLORIDE SERPL-SCNC: 96 MMOL/L (ref 98–110)
CHOLEST SERPL-MCNC: 166 MG/DL (ref 130–200)
CLARITY UR: ABNORMAL
CO2 SERPL-SCNC: 20 MMOL/L (ref 24–31)
COLOR UR: YELLOW
CREAT BLD-MCNC: 1.07 MG/DL (ref 0.5–1.4)
D DIMER PPP FEU-MCNC: 0.67 MG/L (FEU) (ref 0–0.5)
DEPRECATED RDW RBC AUTO: 38.1 FL (ref 40–54)
EOSINOPHIL # BLD AUTO: 0.08 10*3/MM3 (ref 0–0.7)
EOSINOPHIL NFR BLD AUTO: 1.4 % (ref 0–4)
ERYTHROCYTE [DISTWIDTH] IN BLOOD BY AUTOMATED COUNT: 12.6 % (ref 12–15)
FLUAV AG NPH QL: NEGATIVE
FLUBV AG NPH QL IA: NEGATIVE
GFR SERPL CREATININE-BSD FRML MDRD: 71 ML/MIN/1.73
GLOBULIN UR ELPH-MCNC: 3.9 GM/DL
GLUCOSE BLD-MCNC: 173 MG/DL (ref 70–100)
GLUCOSE BLDC GLUCOMTR-MCNC: 166 MG/DL (ref 70–130)
GLUCOSE UR STRIP-MCNC: ABNORMAL MG/DL
HCO3 BLDA-SCNC: 22.9 MMOL/L (ref 20–26)
HCT VFR BLD AUTO: 44.3 % (ref 40–52)
HDLC SERPL-MCNC: 28 MG/DL
HGB BLD-MCNC: 15.3 G/DL (ref 14–18)
HGB UR QL STRIP.AUTO: NEGATIVE
HOLD SPECIMEN: NORMAL
HOLD SPECIMEN: NORMAL
HYALINE CASTS UR QL AUTO: ABNORMAL /LPF
IMM GRANULOCYTES # BLD: 0.02 10*3/MM3 (ref 0–0.03)
IMM GRANULOCYTES NFR BLD: 0.4 % (ref 0–5)
INR PPP: 0.94 (ref 0.91–1.09)
KETONES UR QL STRIP: ABNORMAL
LDLC/HDLC SERPL: 2.59 {RATIO}
LEUKOCYTE ESTERASE UR QL STRIP.AUTO: NEGATIVE
LIPASE SERPL-CCNC: 30 U/L (ref 23–203)
LYMPHOCYTES # BLD AUTO: 1.08 10*3/MM3 (ref 0.72–4.86)
LYMPHOCYTES NFR BLD AUTO: 19.3 % (ref 15–45)
Lab: ABNORMAL
MAGNESIUM SERPL-MCNC: 2 MG/DL (ref 1.4–2.2)
MCH RBC QN AUTO: 29.2 PG (ref 28–32)
MCHC RBC AUTO-ENTMCNC: 34.5 G/DL (ref 33–36)
MCV RBC AUTO: 84.5 FL (ref 82–95)
MODALITY: ABNORMAL
MONOCYTES # BLD AUTO: 0.76 10*3/MM3 (ref 0.19–1.3)
MONOCYTES NFR BLD AUTO: 13.6 % (ref 4–12)
NEUTROPHILS # BLD AUTO: 3.63 10*3/MM3 (ref 1.87–8.4)
NEUTROPHILS NFR BLD AUTO: 64.8 % (ref 39–78)
NITRITE UR QL STRIP: NEGATIVE
NRBC BLD MANUAL-RTO: 0 /100 WBC (ref 0–0)
NT-PROBNP SERPL-MCNC: 197 PG/ML (ref 0–900)
PCO2 BLDA: 33.8 MM HG (ref 35–45)
PH BLDA: 7.44 PH UNITS (ref 7.35–7.45)
PH UR STRIP.AUTO: 6.5 [PH] (ref 5–8)
PLATELET # BLD AUTO: 148 10*3/MM3 (ref 130–400)
PMV BLD AUTO: 11 FL (ref 6–12)
PO2 BLDA: 73.6 MM HG (ref 83–108)
POTASSIUM BLD-SCNC: 4.6 MMOL/L (ref 3.5–5.3)
PROT SERPL-MCNC: 8.5 G/DL (ref 6.3–8.7)
PROT UR QL STRIP: ABNORMAL
PROTHROMBIN TIME: 12.9 SECONDS (ref 11.9–14.6)
RBC # BLD AUTO: 5.24 10*6/MM3 (ref 4.8–5.9)
RBC # UR: ABNORMAL /HPF
REF LAB TEST METHOD: ABNORMAL
SAO2 % BLDCOA: 94.7 % (ref 94–99)
SODIUM BLD-SCNC: 136 MMOL/L (ref 135–145)
SP GR UR STRIP: 1.02 (ref 1–1.03)
SQUAMOUS #/AREA URNS HPF: ABNORMAL /HPF
TRIGL SERPL-MCNC: 327 MG/DL (ref 0–149)
TROPONIN I SERPL-MCNC: 0.02 NG/ML (ref 0–0.03)
TSH SERPL DL<=0.05 MIU/L-ACNC: 1.23 MIU/ML (ref 0.47–4.68)
UROBILINOGEN UR QL STRIP: ABNORMAL
VENTILATOR MODE: ABNORMAL
WBC NRBC COR # BLD: 5.6 10*3/MM3 (ref 4.8–10.8)
WBC UR QL AUTO: ABNORMAL /HPF
WHOLE BLOOD HOLD SPECIMEN: NORMAL
WHOLE BLOOD HOLD SPECIMEN: NORMAL

## 2018-03-15 PROCEDURE — 0 IOPAMIDOL PER 1 ML: Performed by: INTERNAL MEDICINE

## 2018-03-15 PROCEDURE — 83880 ASSAY OF NATRIURETIC PEPTIDE: CPT | Performed by: NURSE PRACTITIONER

## 2018-03-15 PROCEDURE — G0378 HOSPITAL OBSERVATION PER HR: HCPCS

## 2018-03-15 PROCEDURE — 63710000001 INSULIN LISPRO (HUMAN) PER 5 UNITS: Performed by: NURSE PRACTITIONER

## 2018-03-15 PROCEDURE — 85730 THROMBOPLASTIN TIME PARTIAL: CPT | Performed by: NURSE PRACTITIONER

## 2018-03-15 PROCEDURE — 87804 INFLUENZA ASSAY W/OPTIC: CPT | Performed by: NURSE PRACTITIONER

## 2018-03-15 PROCEDURE — 99284 EMERGENCY DEPT VISIT MOD MDM: CPT

## 2018-03-15 PROCEDURE — 81001 URINALYSIS AUTO W/SCOPE: CPT | Performed by: NURSE PRACTITIONER

## 2018-03-15 PROCEDURE — 96374 THER/PROPH/DIAG INJ IV PUSH: CPT

## 2018-03-15 PROCEDURE — 71045 X-RAY EXAM CHEST 1 VIEW: CPT

## 2018-03-15 PROCEDURE — 83690 ASSAY OF LIPASE: CPT | Performed by: NURSE PRACTITIONER

## 2018-03-15 PROCEDURE — 36600 WITHDRAWAL OF ARTERIAL BLOOD: CPT

## 2018-03-15 PROCEDURE — 93010 ELECTROCARDIOGRAM REPORT: CPT | Performed by: INTERNAL MEDICINE

## 2018-03-15 PROCEDURE — 85610 PROTHROMBIN TIME: CPT | Performed by: NURSE PRACTITIONER

## 2018-03-15 PROCEDURE — 84443 ASSAY THYROID STIM HORMONE: CPT | Performed by: NURSE PRACTITIONER

## 2018-03-15 PROCEDURE — 80061 LIPID PANEL: CPT | Performed by: NURSE PRACTITIONER

## 2018-03-15 PROCEDURE — 93005 ELECTROCARDIOGRAM TRACING: CPT | Performed by: NURSE PRACTITIONER

## 2018-03-15 PROCEDURE — 63710000001 INSULIN DETEMIR PER 5 UNITS: Performed by: INTERNAL MEDICINE

## 2018-03-15 PROCEDURE — 94799 UNLISTED PULMONARY SVC/PX: CPT

## 2018-03-15 PROCEDURE — 70450 CT HEAD/BRAIN W/O DYE: CPT

## 2018-03-15 PROCEDURE — 96372 THER/PROPH/DIAG INJ SC/IM: CPT

## 2018-03-15 PROCEDURE — 82803 BLOOD GASES ANY COMBINATION: CPT

## 2018-03-15 PROCEDURE — 85025 COMPLETE CBC W/AUTO DIFF WBC: CPT | Performed by: NURSE PRACTITIONER

## 2018-03-15 PROCEDURE — 80053 COMPREHEN METABOLIC PANEL: CPT | Performed by: NURSE PRACTITIONER

## 2018-03-15 PROCEDURE — 25010000002 ENOXAPARIN PER 10 MG: Performed by: NURSE PRACTITIONER

## 2018-03-15 PROCEDURE — 85379 FIBRIN DEGRADATION QUANT: CPT | Performed by: NURSE PRACTITIONER

## 2018-03-15 PROCEDURE — 25010000002 ONDANSETRON PER 1 MG: Performed by: NURSE PRACTITIONER

## 2018-03-15 PROCEDURE — 71275 CT ANGIOGRAPHY CHEST: CPT

## 2018-03-15 PROCEDURE — 83735 ASSAY OF MAGNESIUM: CPT | Performed by: NURSE PRACTITIONER

## 2018-03-15 PROCEDURE — 82962 GLUCOSE BLOOD TEST: CPT

## 2018-03-15 PROCEDURE — 84484 ASSAY OF TROPONIN QUANT: CPT | Performed by: NURSE PRACTITIONER

## 2018-03-15 PROCEDURE — 94760 N-INVAS EAR/PLS OXIMETRY 1: CPT

## 2018-03-15 PROCEDURE — 82150 ASSAY OF AMYLASE: CPT | Performed by: NURSE PRACTITIONER

## 2018-03-15 RX ORDER — ISOSORBIDE DINITRATE 20 MG/1
20 TABLET ORAL EVERY 12 HOURS SCHEDULED
Status: DISCONTINUED | OUTPATIENT
Start: 2018-03-15 | End: 2018-03-17 | Stop reason: HOSPADM

## 2018-03-15 RX ORDER — DEXTROSE MONOHYDRATE 25 G/50ML
25 INJECTION, SOLUTION INTRAVENOUS
Status: DISCONTINUED | OUTPATIENT
Start: 2018-03-15 | End: 2018-03-17 | Stop reason: HOSPADM

## 2018-03-15 RX ORDER — GUAIFENESIN AND DEXTROMETHORPHAN HYDROBROMIDE 100; 10 MG/5ML; MG/5ML
10 SOLUTION ORAL EVERY 6 HOURS PRN
COMMUNITY
End: 2018-05-14

## 2018-03-15 RX ORDER — ASPIRIN 81 MG/1
81 TABLET, CHEWABLE ORAL DAILY
Status: DISCONTINUED | OUTPATIENT
Start: 2018-03-16 | End: 2018-03-17 | Stop reason: HOSPADM

## 2018-03-15 RX ORDER — SODIUM CHLORIDE 0.9 % (FLUSH) 0.9 %
1-10 SYRINGE (ML) INJECTION AS NEEDED
Status: DISCONTINUED | OUTPATIENT
Start: 2018-03-15 | End: 2018-03-17 | Stop reason: HOSPADM

## 2018-03-15 RX ORDER — LISINOPRIL 20 MG/1
20 TABLET ORAL NIGHTLY
Status: DISCONTINUED | OUTPATIENT
Start: 2018-03-15 | End: 2018-03-17 | Stop reason: HOSPADM

## 2018-03-15 RX ORDER — FLUTICASONE PROPIONATE 50 MCG
2 SPRAY, SUSPENSION (ML) NASAL DAILY
Status: DISCONTINUED | OUTPATIENT
Start: 2018-03-16 | End: 2018-03-17 | Stop reason: HOSPADM

## 2018-03-15 RX ORDER — ONDANSETRON 2 MG/ML
4 INJECTION INTRAMUSCULAR; INTRAVENOUS EVERY 6 HOURS PRN
Status: DISCONTINUED | OUTPATIENT
Start: 2018-03-15 | End: 2018-03-17 | Stop reason: HOSPADM

## 2018-03-15 RX ORDER — METOPROLOL TARTRATE 50 MG/1
50 TABLET, FILM COATED ORAL EVERY 12 HOURS SCHEDULED
Status: DISCONTINUED | OUTPATIENT
Start: 2018-03-15 | End: 2018-03-17 | Stop reason: HOSPADM

## 2018-03-15 RX ORDER — LEVETIRACETAM 500 MG/1
2000 TABLET ORAL NIGHTLY
Status: DISCONTINUED | OUTPATIENT
Start: 2018-03-16 | End: 2018-03-17 | Stop reason: HOSPADM

## 2018-03-15 RX ORDER — LAMOTRIGINE 100 MG/1
200 TABLET ORAL EVERY 12 HOURS SCHEDULED
Status: DISCONTINUED | OUTPATIENT
Start: 2018-03-15 | End: 2018-03-17 | Stop reason: HOSPADM

## 2018-03-15 RX ORDER — LEVETIRACETAM 500 MG/1
1500 TABLET ORAL DAILY
COMMUNITY
End: 2018-03-26 | Stop reason: SDUPTHER

## 2018-03-15 RX ORDER — ONDANSETRON 2 MG/ML
4 INJECTION INTRAMUSCULAR; INTRAVENOUS ONCE
Status: COMPLETED | OUTPATIENT
Start: 2018-03-15 | End: 2018-03-15

## 2018-03-15 RX ORDER — GUAIFENESIN 200 MG/10ML
200 LIQUID ORAL 3 TIMES DAILY PRN
Status: ON HOLD | COMMUNITY
End: 2018-03-15

## 2018-03-15 RX ORDER — PANTOPRAZOLE SODIUM 40 MG/1
40 TABLET, DELAYED RELEASE ORAL
Status: DISCONTINUED | OUTPATIENT
Start: 2018-03-16 | End: 2018-03-17 | Stop reason: HOSPADM

## 2018-03-15 RX ORDER — LEVETIRACETAM 500 MG/1
2000 TABLET ORAL NIGHTLY
COMMUNITY
End: 2018-03-26 | Stop reason: SDUPTHER

## 2018-03-15 RX ORDER — SODIUM CHLORIDE 9 MG/ML
50 INJECTION, SOLUTION INTRAVENOUS CONTINUOUS
Status: DISCONTINUED | OUTPATIENT
Start: 2018-03-15 | End: 2018-03-16

## 2018-03-15 RX ORDER — ACETAMINOPHEN 325 MG/1
650 TABLET ORAL EVERY 4 HOURS PRN
Status: DISCONTINUED | OUTPATIENT
Start: 2018-03-15 | End: 2018-03-17 | Stop reason: HOSPADM

## 2018-03-15 RX ORDER — HYDROCODONE BITARTRATE AND ACETAMINOPHEN 5; 325 MG/1; MG/1
1 TABLET ORAL EVERY 4 HOURS PRN
Status: DISCONTINUED | OUTPATIENT
Start: 2018-03-15 | End: 2018-03-17 | Stop reason: HOSPADM

## 2018-03-15 RX ORDER — FLUTICASONE PROPIONATE 50 MCG
2 SPRAY, SUSPENSION (ML) NASAL DAILY PRN
COMMUNITY

## 2018-03-15 RX ORDER — LEVETIRACETAM 500 MG/1
1500 TABLET ORAL DAILY
Status: DISCONTINUED | OUTPATIENT
Start: 2018-03-16 | End: 2018-03-17 | Stop reason: HOSPADM

## 2018-03-15 RX ORDER — GUAIFENESIN 600 MG/1
1200 TABLET, EXTENDED RELEASE ORAL EVERY 12 HOURS SCHEDULED
Status: DISCONTINUED | OUTPATIENT
Start: 2018-03-15 | End: 2018-03-17 | Stop reason: HOSPADM

## 2018-03-15 RX ORDER — LAMOTRIGINE 100 MG/1
100 TABLET ORAL EVERY 12 HOURS SCHEDULED
Status: DISCONTINUED | OUTPATIENT
Start: 2018-03-15 | End: 2018-03-15 | Stop reason: DRUGHIGH

## 2018-03-15 RX ORDER — NICOTINE POLACRILEX 4 MG
15 LOZENGE BUCCAL
Status: DISCONTINUED | OUTPATIENT
Start: 2018-03-15 | End: 2018-03-17 | Stop reason: HOSPADM

## 2018-03-15 RX ORDER — METOPROLOL TARTRATE 100 MG/1
75 TABLET ORAL 2 TIMES DAILY
COMMUNITY
End: 2019-07-17 | Stop reason: SDUPTHER

## 2018-03-15 RX ORDER — DOCUSATE SODIUM 100 MG/1
100 CAPSULE, LIQUID FILLED ORAL 2 TIMES DAILY
Status: DISCONTINUED | OUTPATIENT
Start: 2018-03-15 | End: 2018-03-17 | Stop reason: HOSPADM

## 2018-03-15 RX ADMIN — INSULIN DETEMIR 65 UNITS: 100 INJECTION, SOLUTION SUBCUTANEOUS at 22:03

## 2018-03-15 RX ADMIN — IOPAMIDOL 100 ML: 755 INJECTION, SOLUTION INTRAVENOUS at 17:38

## 2018-03-15 RX ADMIN — SODIUM CHLORIDE 50 ML/HR: 9 INJECTION, SOLUTION INTRAVENOUS at 18:39

## 2018-03-15 RX ADMIN — LEVETIRACETAM 2000 MG: 500 TABLET, FILM COATED ORAL at 23:57

## 2018-03-15 RX ADMIN — LISINOPRIL 20 MG: 20 TABLET ORAL at 22:58

## 2018-03-15 RX ADMIN — LAMOTRIGINE 200 MG: 100 TABLET ORAL at 22:59

## 2018-03-15 RX ADMIN — GUAIFENESIN 1200 MG: 600 TABLET, EXTENDED RELEASE ORAL at 22:59

## 2018-03-15 RX ADMIN — ENOXAPARIN SODIUM 40 MG: 40 INJECTION SUBCUTANEOUS at 22:01

## 2018-03-15 RX ADMIN — ONDANSETRON 4 MG: 2 INJECTION INTRAMUSCULAR; INTRAVENOUS at 15:08

## 2018-03-15 RX ADMIN — ISOSORBIDE DINITRATE 20 MG: 20 TABLET ORAL at 22:18

## 2018-03-15 RX ADMIN — INSULIN LISPRO 2 UNITS: 100 INJECTION, SOLUTION INTRAVENOUS; SUBCUTANEOUS at 22:03

## 2018-03-15 RX ADMIN — METOPROLOL TARTRATE 50 MG: 50 TABLET, FILM COATED ORAL at 22:01

## 2018-03-15 NOTE — H&P
[facility] Medicine Services  HISTORY AND PHYSICAL    Date of Admission: 3/15/2018  Primary Care Physician: GINA Felder    Subjective     Chief Complaint: Syncope    History of Present Illness  Patient is a 57-year-old  male with past medical history significant for coronary artery disease (multiple cardiac stents), insulin-dependent diabetes mellitus, reported carotid occlusive disease, the presented to our hospital after having a syncopal episode.  Patient reports that he is been in his usual state of health, other than suffering from a cough and congestion over the past few days.  He has not been started on any new medications other than some cough [facility] (dextromethorphan and guaifenesin).  He reports that he got out of his chair this morning a little bit before 11 o'clock and was walking to the refrigerator.  He had the sensation of lightheadedness when he arrived to the refrigerator, and felt like he was going to pass out.  He remembers reaching up to hold onto the top of the refrigerator, and then his next recollection was his dog licking him on the face, and he was lying on the floor.  He does not recall the amount of time he was on the floor, but estimates that it was not long.  He did report an episode of nausea and vomiting which evidently occurred at the same time when he had this syncopal spell, as when he woke up he did notice emesis on the kitchen floor.  He does report a history of recent palpitations.  He reports no chest pain.  He denies any new shortness of breath.  He reports that his appetite is been stable and has had no recent dehydration.  Again, he has not been started on any new medications recently.  He reported a similar episode which occurred back in January, and at that time had a neuro workup performed per his report which included both a CAT scan in addition to an MRI, he was evaluated by his  neurologist Dr. White.  He reports no symptoms of seizure activity associated with the symptoms which occurred today.    Review of Systems     Otherwise complete ROS reviewed and negative except as mentioned in the HPI.    Past Medical History:   Past Medical History:   Diagnosis Date   • Arthritis    • Asthma    • Carotid disease, bilateral    • Chest pain    • Chronic ischemic heart disease    • Chronic sinusitis    • Kathia bullosa    • Coronary artery disease    • Deviated septum    • Diabetes mellitus    • Difficulty urinating    • Diverticulitis    • Enlarged prostate    • Fatty liver    • GERD (gastroesophageal reflux disease)    • Hyperlipidemia    • Hypertension    • Hypertrophy of nasal turbinates    • Keratoderma    • Kidney stone    • Migraine    • Murmur, heart    • Myocardial infarction    • Obesity    • PONV (postoperative nausea and vomiting)    • Psoriasis    • Seizures    • Sinus congestion    • Sleep apnea    • SOB (shortness of breath)    • Stroke      Past Surgical History:  Past Surgical History:   Procedure Laterality Date   • CARDIAC CATHETERIZATION  01/2016    Dr. Broadbent; widely patent previously placed stents in the left anterior descending and obstructive disease involving the diagonal branch which was treated medically   • CARDIAC CATHETERIZATION N/A 7/14/2017    Procedure: Left Heart Cath;  Surgeon: Wade Ramey MD;  Location: [facility] CATH INVASIVE LOCATION;  Service:    • CORONARY ANGIOPLASTY     • CORONARY STENT PLACEMENT      Pt states 5 or 6 stents   • ENDOSCOPIC FUNCTIONAL SINUS SURGERY (FESS) Bilateral 12/13/2017    Procedure: PROCEDURE PERFORMED:  Bilateral functional endoscopic anterior ethmoidectomy with bilateral middle meatal antrostomy Septoplasty Right kathia bullosa resection Bilateral inferior turbinate reduction via Coblation;  Surgeon: Mayank Ibarra MD;  Location: [facility] OR;  Service:    • HERNIA REPAIR      x2 inguinal area   • KIDNEY STONE SURGERY     • KNEE  SURGERY Right    • OTHER SURGICAL HISTORY      urolift   • THUMB AMPUTATION Left     partial   • TOE AMPUTATION Right     big     Social History:  reports that he quit smoking about 25 years ago. He quit smokeless tobacco use about 9 years ago. His smokeless tobacco use included Chew. He reports that he does not drink alcohol or use drugs.    Family History: family history includes Heart disease in his father and maternal grandmother.   Patient reports significant early family medical history of cardiac disease.    Allergies:  Allergies   Allergen Reactions   • Flagyl [Metronidazole] Hives   • Atorvastatin Other (See Comments)     Muscle cramps   • Ciprofloxacin Hives     Medications:  Prior to Admission medications    Medication Sig Start Date End Date Taking? Authorizing Provider   guaifenesin (ROBITUSSIN) 100 MG/5ML liquid Take 200 mg by mouth 3 (Three) Times a Day As Needed for Cough.   Yes Historical Provider, MD   albuterol (PROVENTIL HFA;VENTOLIN HFA) 108 (90 BASE) MCG/ACT inhaler Inhale 2 puffs Every 6 (Six) Hours As Needed for wheezing.    Historical Provider, MD   aspirin 81 MG chewable tablet Chew 81 mg Daily.    Historical Provider, MD   benzonatate (TESSALON PERLES) 100 MG capsule Take 1 capsule by mouth 3 (Three) Times a Day As Needed for Cough. 1/9/18   TEENA Castillo   Calcium Polycarbophil 625 MG chewable tablet Chew Daily.    Historical Provider, MD   carboxymethylcellulose (REFRESH PLUS) 0.5 % solution 1 drop 4 (Four) Times a Day As Needed for dry eyes.    Historical Provider, MD   Emollient (AQUAPHOR EX) Apply  topically Daily. To heels    Historical Provider, MD   enoxaparin (LOVENOX) 120 MG/0.8ML solution syringe Inject  under the skin Every 12 (Twelve) Hours.    Historical Provider, MD   FLUTICASONE PROPIONATE, NASAL, NA 50 mcg into each nostril Daily. 2 SPRAYS IN EACH NOSTRIL    Historical Provider, MD   insulin aspart (novoLOG) 100 UNIT/ML injection Inject 25 Units under the skin 3  (Three) Times a Day Before Meals.    Historical Provider, MD   insulin glargine (LANTUS) 100 UNIT/ML injection Inject 50 Units under the skin 2 (Two) Times a Day.    Historical Provider, MD   isosorbide dinitrate (ISORDIL) 20 MG tablet Take 1 tablet by mouth 2 (Two) Times a Day. 10/18/16   TEENA Garcia   lamoTRIgine (LaMICtal) 100 MG tablet Take 100 mg by mouth 2 (Two) Times a Day.    Historical Provider, MD   levETIRAcetam (KEPPRA) 500 MG tablet Take 3 tablets in the morning and 4 tablets at bedtime  Patient taking differently: Take 3,500 mg by mouth Daily. Take 3 tablets in the morning and 4 tablets at bedtime 8/17/17   TEENA Castillo   lisinopril (PRINIVIL,ZESTRIL) 40 MG tablet Take 40 mg by mouth. Take a 1/2 tab at bedtime by mouth    Historical Provider, MD   metFORMIN (GLUCOPHAGE) 1000 MG tablet Take 1,000 mg by mouth 2 (Two) Times a Day With Meals.    Historical Provider, MD   metoprolol tartrate (LOPRESSOR) 50 MG tablet Take 1 tablet by mouth 2 (Two) Times a Day. 10/18/16   TEENA Garcia   Multiple Vitamin (MULTI VITAMIN PO) Take 1 tablet by mouth Daily.    Historical Provider, MD   naproxen sodium (ALEVE) 220 MG tablet Take 220 mg by mouth As Needed for Headache.    Historical Provider, MD   nitroglycerin (NITROSTAT) 0.4 MG SL tablet Place 0.4 mg under the tongue Every 5 (Five) Minutes As Needed for chest pain. Take no more than 3 doses in 15 minutes.    Historical Provider, MD   pantoprazole (PROTONIX) 40 MG EC tablet Take 40 mg by mouth 2 (Two) Times a Day.    Historical Provider, MD   psyllium (METAMUCIL) 58.6 % packet Take  by mouth Daily. 1 TABLESPOON    Historical Provider, MD   rosuvastatin (CRESTOR) 40 MG tablet Take 20 mg by mouth Daily.    Historical Provider, MD   triamterene-hydrochlorothiazide (MAXZIDE) 75-50 MG per tablet Take 1 tablet by mouth Daily.    Historical Provider, MD   urea (CARMOL) 40 % ointment Apply  topically Daily.    Historical Provider, MD  "    Objective     Vital Signs: /96   Pulse 84   Temp 98.3 °F (36.8 °C) (Temporal Artery )   Resp 14   Ht 182.9 cm (72\")   Wt 102 kg (225 lb)   SpO2 94%   BMI 30.52 kg/m²   Physical Exam   Constitutional: He is oriented to person, place, and time. No distress.   HENT:   Head: Normocephalic.   Mouth/Throat: No oropharyngeal exudate.   Eyes: No scleral icterus.   Neck: No tracheal deviation present.   Cardiovascular: Normal rate and regular rhythm.    Pulmonary/Chest: Effort normal. No respiratory distress. He has no wheezes.   Abdominal: Soft. He exhibits no distension. There is no tenderness.   Musculoskeletal: He exhibits no edema.   Neurological: He is alert and oriented to person, place, and time. No cranial nerve deficit.   Skin: Skin is warm and dry. He is not diaphoretic.   Psychiatric: He has a normal mood and affect. His behavior is normal.   Vitals reviewed.    Results Reviewed:  Lab Results (last 24 hours)     Procedure Component Value Units Date/Time    Urinalysis With / Culture If Indicated - Urine, Clean Catch [[id number]]  (Abnormal) Collected:  03/15/18 1602    Specimen:  Urine from Urine, Clean Catch Updated:  03/15/18 1631     Color, UA Yellow     Appearance, UA Cloudy (A)     pH, UA 6.5     Specific Gravity, UA 1.021     Glucose,  mg/dL (Trace) (A)     Ketones, UA Trace (A)     Bilirubin, UA Negative     Blood, UA Negative     Protein, UA 30 mg/dL (1+) (A)     Leuk Esterase, UA Negative     Nitrite, UA Negative     Urobilinogen, UA 2.0 E.U./dL (A)    Urinalysis, Microscopic Only - Urine, Clean Catch [[id number]]  (Abnormal) Collected:  03/15/18 1602    Specimen:  Urine from Urine, Clean Catch Updated:  03/15/18 1631     RBC, UA 6-12 (A) /HPF      WBC, UA 0-2 (A) /HPF      Bacteria, UA None Seen /HPF      Squamous Epithelial Cells, UA 0-2 /HPF      Hyaline Casts, UA 3-6 /LPF      Methodology Automated Microscopy    [place] Draw [[id number]] Collected:  03/15/18 1505    Specimen:  " Blood Updated:  03/15/18 1616    Narrative:       The following orders were created for panel order [place] Draw.  Procedure                               Abnormality         Status                     ---------                               -----------         ------                     Light Blue Top[[id number]]                                   Final result               Green Top (Gel)[[id number]]                                  Final result               Lavender Top[[id number]]                                     Final result               Red Top[[id number]]                                          Final result                 Please view results for these tests on the individual orders.    Light Blue Top [[id number]] Collected:  03/15/18 1505    Specimen:  Blood from Arm, Right Updated:  03/15/18 1616     Extra Tube hold for add-on     Comment: Auto resulted       Green Top (Gel) [[id number]] Collected:  03/15/18 1505    Specimen:  Blood from Arm, Right Updated:  03/15/18 1616     Extra Tube Hold for add-ons.     Comment: Auto resulted.       Lavender Top [[id number]] Collected:  03/15/18 1505    Specimen:  Blood from Arm, Right Updated:  03/15/18 1616     Extra Tube hold for add-on     Comment: Auto resulted       Red Top [[id number]] Collected:  03/15/18 1505    Specimen:  Blood from Arm, Right Updated:  03/15/18 1616     Extra Tube Hold for add-ons.     Comment: Auto resulted.       Protime-INR [[id number]]  (Normal) Collected:  03/15/18 1505    Specimen:  Blood from Arm, Right Updated:  03/15/18 1545     Protime 12.9 Seconds      INR 0.94    aPTT [[id number]]  (Normal) Collected:  03/15/18 1505    Specimen:  Blood from Arm, Right Updated:  03/15/18 1545     PTT 26.9 seconds     D-dimer, Quantitative [[id number]]  (Abnormal) Collected:  03/15/18 1505    Specimen:  Blood from Arm, Right Updated:  03/15/18 1545     D-Dimer, Quantitative 0.67 (H) mg/L (FEU)     Narrative:       Reference Range is 0-0.50 mg/L FEU.  However, results <0.50 mg/L FEU tends to rule out DVT or PE. Results >0.50 mg/L FEU are not useful in predicting absence or presence of DVT or PE.    BNP [[id number]]  (Normal) Collected:  03/15/18 1505    Specimen:  Blood from Arm, Right Updated:  03/15/18 1542     proBNP 197.0 pg/mL     Troponin [[id number]]  (Normal) Collected:  03/15/18 1505    Specimen:  Blood from Arm, Right Updated:  03/15/18 1542     Troponin I 0.016 ng/mL     Influenza Antigen, Rapid - Swab, Nasopharynx [[id number]]  (Normal) Collected:  03/15/18 1502    Specimen:  Swab from Nasopharynx Updated:  03/15/18 1536     Influenza A Ag, EIA Negative     Influenza B Ag, EIA Negative    Narrative:         Recommend confirmation of negative results by viral culture or molecular assay.    Comprehensive Metabolic Panel [[id number]]  (Abnormal) Collected:  03/15/18 1505    Specimen:  Blood from Arm, Right Updated:  03/15/18 1527     Glucose 173 (H) mg/dL      BUN 15 mg/dL      Creatinine 1.07 mg/dL      Sodium 136 mmol/L      Potassium 4.6 mmol/L      Chloride 96 (L) mmol/L      CO2 20.0 (L) mmol/L      Calcium 9.9 mg/dL      Total Protein 8.5 g/dL      Albumin 4.60 g/dL      ALT (SGPT) 64 (H) U/L      AST (SGOT) 44 U/L      Alkaline Phosphatase 85 U/L      Total Bilirubin 1.4 (H) mg/dL      eGFR Non African Amer 71 mL/min/1.73      Globulin 3.9 gm/dL      A/G Ratio 1.2 g/dL      BUN/Creatinine Ratio 14.0     Anion Gap 20.0 (H) mmol/L     Lipase [[id number]]  (Normal) Collected:  03/15/18 1505    Specimen:  Blood from Arm, Right Updated:  03/15/18 1527     Lipase 30 U/L     Amylase [[id number]]  (Normal) Collected:  03/15/18 1505    Specimen:  Blood from Arm, Right Updated:  03/15/18 1527     Amylase 65 U/L     Magnesium [[id number]]  (Normal) Collected:  03/15/18 1505    Specimen:  Blood from Arm, Right Updated:  03/15/18 1527     Magnesium 2.0 mg/dL     CBC & Differential [[id number]] Collected:  03/15/18 1505    Specimen:  Blood Updated:  03/15/18 1510     Narrative:       The following orders were created for panel order CBC & Differential.  Procedure                               Abnormality         Status                     ---------                               -----------         ------                     CBC Auto Differential[[id number]]        Abnormal            Final result                 Please view results for these tests on the individual orders.    CBC Auto Differential [[id number]]  (Abnormal) Collected:  03/15/18 1505    Specimen:  Blood from Arm, Right Updated:  03/15/18 1517     WBC 5.60 10*3/mm3      RBC 5.24 10*6/mm3      Hemoglobin 15.3 g/dL      Hematocrit 44.3 %      MCV 84.5 fL      MCH 29.2 pg      MCHC 34.5 g/dL      RDW 12.6 %      RDW-SD 38.1 (L) fl      MPV 11.0 fL      Platelets 148 10*3/mm3      Neutrophil % 64.8 %      Lymphocyte % 19.3 %      Monocyte % 13.6 (H) %      Eosinophil % 1.4 %      Basophil % 0.5 %      Immature Grans % 0.4 %      Neutrophils, Absolute 3.63 10*3/mm3      Lymphocytes, Absolute 1.08 10*3/mm3      Monocytes, Absolute 0.76 10*3/mm3      Eosinophils, Absolute 0.08 10*3/mm3      Basophils, Absolute 0.03 10*3/mm3      Immature Grans, Absolute 0.02 10*3/mm3      nRBC 0.0 /100 WBC     Blood Gas, Arterial [[id number]]  (Abnormal) Collected:  03/15/18 1422    Specimen:  Arterial Blood Updated:  03/15/18 1427     Site Right Brachial     Thien's Test N/A     pH, Arterial 7.440 pH units      pCO2, Arterial 33.8 (L) mm Hg      pO2, Arterial 73.6 (L) mm Hg      HCO3, Arterial 22.9 mmol/L      Base Excess, Arterial -0.6 (L) mmol/L      O2 Saturation, Arterial 94.7 %      Temperature 37.0 C      Barometric Pressure for Blood Gas 748 mmHg      Modality Room Air     Ventilator Mode NA     Collected by [id number]        Imaging Results (last 24 hours)     Procedure Component Value Units Date/Time    CT Head Without Contrast [[id number]] Collected:  03/15/18 1533     Updated:  03/15/18 1553    Narrative:       CT HEAD WO CONTRAST-  3/15/2018 2:44 PM CDT     HISTORY: syncope       COMPARISON: 1/8/2018      DOSE LENGTH PRODUCT: 639 mGy cm. Automated exposure control was also  utilized to decrease patient radiation dose.     TECHNIQUE: Helical tomographic images of the brain were obtained without  the use of intravenous contrast.      FINDINGS:   Hemorrhage: None.     Mass effect: No midline shift or mass effect.     Ventricles: Normal and symmetric without hydrocephalus.     Basilar cisterns: Normal.     Sulci: Normal in configuration. No sulcal effacement.     Extra-axial fluid: No abnormal extra-axial fluid collections.     Grey and white matter differentiation: Normal.     Posterior fossa and brainstem: Normal.     Bones: No fractures or lesions.     Soft tissues: No acute process.     Sinuses and mastoid air cells: Postsurgical changes are seen in the  sinuses. Chronic sinusitis is noted.       Impression:       1. No acute intracranial process.  2. Evidence of chronic sinusitis.        This report was finalized on 03/15/2018 15:34 by Dr. Delio Langston MD.    XR Chest 1 View [[id number]] Collected:  03/15/18 1441     Updated:  03/15/18 1553    Narrative:       EXAMINATION: XR CHEST 1 VW- 3/15/2018 2:41 PM CDT     HISTORY: Syncope. COPD.     REPORT: Comparison is made with the study from 11/28/2017.     Lungs are hyperinflated, no infiltrate or consolidation is identified.  There is borderline cardiomegaly without evidence of CHF. Ectasia of the  thoracic aorta is present. No pneumothorax or pleural effusion is  identified. Degenerative spurring is seen throughout the thoracic spine  and at the sternocostal junction. Healed rib fractures are present on  the left.       Impression:       COPD. No acute cardiopulmonary abnormality.  This report was finalized on 03/15/2018 14:42 by Dr. Cyril Reyna MD.        I have personally reviewed and interpreted the radiology studies and ECG obtained at time of admission.     Assessment / Plan      Assessment:   Hospital Problem List     Syncope and collapse        Assessment:  1.  Syncope and collapse  2.  Carotid occlusive disease  3.  Coronary artery disease with h/o multiple cardiac stents  4.  Palpitations  5.  Hypertension  6.  Insulin dependent diabetes mellitus  7.  Seizure disorder    Plan:   1.  Observation  2.  Telemetry  3.  Echocardiogram  4.  Noninvasive carotid studies  5.  Check orthostatic vitals  6.  CTA Chest ordered in ED; results pending  7.  Lovenox (currently at PPx dose)  8.  Will confirm patient's outpatient medication list/reconciliation  9.  Hold Metformin s/p contrasted study  10.  Repeat labs in AM  11.  Workup ongoing    Gurinder Beckman MD   03/15/18   5:14 PM

## 2018-03-15 NOTE — ED PROVIDER NOTES
Subjective   Patient is a 57-year-old white male presents after having a syncopal episode at home just prior to arrival.  He states he had gotten up from the couch to walk to the kitchen and became dizzy and passed out and fell on the floor.  He states he had a similar episode in January 2018.  She has had a cough and shortness of breath for the past 3-4 days.  He states when he woke from his episode that he had vomited as well.  He states he has a mild headache at this time.  He denies any chest pain.  He does have a history of previous CVA and coronary artery disease as well.            Review of Systems   Constitutional: Negative.    HENT: Negative.    Eyes: Negative.    Respiratory: Negative.    Cardiovascular: Negative.    Gastrointestinal: Negative.    Endocrine: Negative.    Genitourinary: Negative.    Musculoskeletal: Negative.    Skin: Negative.    Allergic/Immunologic: Negative.    Neurological:        Patient is a 57-year-old white male presents after having a syncopal episode at home just prior to arrival.  He states he had gotten up from the couch to walk to the kitchen and became dizzy and passed out and fell on the floor.  He states he had a similar episode in January 2018.  She has had a cough and shortness of breath for the past 3-4 days.  He states when he woke from his episode that he had vomited as well.  He states he has a mild headache at this time.  He denies any chest pain.  He does have a history of previous CVA and coronary artery disease as well.     Hematological: Negative.    Psychiatric/Behavioral: Negative.    All other systems reviewed and are negative.      Past Medical History:   Diagnosis Date   • Arthritis    • Asthma    • Carotid disease, bilateral    • Chest pain    • Chronic ischemic heart disease    • Chronic sinusitis    • Maribell bullosa    • Coronary artery disease    • Deviated septum    • Diabetes mellitus    • Difficulty urinating    • Diverticulitis    • Enlarged  prostate    • Fatty liver    • GERD (gastroesophageal reflux disease)    • Hyperlipidemia    • Hypertension    • Hypertrophy of nasal turbinates    • Keratoderma    • Kidney stone    • Migraine    • Murmur, heart    • Myocardial infarction    • Obesity    • PONV (postoperative nausea and vomiting)    • Psoriasis    • Seizures    • Sinus congestion    • Sleep apnea    • SOB (shortness of breath)    • Stroke        Allergies   Allergen Reactions   • Flagyl [Metronidazole] Hives   • Atorvastatin Other (See Comments)     Muscle cramps   • Ciprofloxacin Hives       Past Surgical History:   Procedure Laterality Date   • CARDIAC CATHETERIZATION  01/2016    Dr. Broadbent; widely patent previously placed stents in the left anterior descending and obstructive disease involving the diagonal branch which was treated medically   • CARDIAC CATHETERIZATION N/A 7/14/2017    Procedure: Left Heart Cath;  Surgeon: Wade Ramey MD;  Location:  [facility];  Service:    • CORONARY ANGIOPLASTY     • CORONARY STENT PLACEMENT      Pt states 5 or 6 stents   • ENDOSCOPIC FUNCTIONAL SINUS SURGERY (FESS) Bilateral 12/13/2017    Procedure: PROCEDURE PERFORMED:  Bilateral functional endoscopic anterior ethmoidectomy with bilateral middle meatal antrostomy Septoplasty Right kathia bullosa resection Bilateral inferior turbinate reduction via Coblation;  Surgeon: Mayank Ibarra MD;  Location: [facility] OR;  Service:    • HERNIA REPAIR      x2 inguinal area   • KIDNEY STONE SURGERY     • KNEE SURGERY Right    • OTHER SURGICAL HISTORY      urolift   • THUMB AMPUTATION Left     partial   • TOE AMPUTATION Right     big       Family History   Problem Relation Age of Onset   • Heart disease Father    • Heart disease Maternal Grandmother        Social History     Social History   • Marital status:      Social History Main Topics   • Smoking status: Former Smoker     Quit date: 1993   • Smokeless tobacco: Former User     Types:  Chew     Quit date: 2009      Comment: Pt quit 25 years ago   • Alcohol use No      Comment: quit 2009   • Drug use: No   • Sexual activity: Defer     Other Topics Concern   • Not on file       Prior to Admission medications    Medication Sig Start Date End Date Taking? Authorizing Provider   guaifenesin (ROBITUSSIN) 100 MG/5ML liquid Take 200 mg by mouth 3 (Three) Times a Day As Needed for Cough.   Yes Historical Provider, MD   albuterol (PROVENTIL HFA;VENTOLIN HFA) 108 (90 BASE) MCG/ACT inhaler Inhale 2 puffs Every 6 (Six) Hours As Needed for wheezing.    Historical Provider, MD   aspirin 81 MG chewable tablet Chew 81 mg Daily.    Historical Provider, MD   benzonatate (TESSALON PERLES) 100 MG capsule Take 1 capsule by mouth 3 (Three) Times a Day As Needed for Cough. 1/9/18   TEENA Castillo   Calcium Polycarbophil 625 MG chewable tablet Chew Daily.    Historical Provider, MD   carboxymethylcellulose (REFRESH PLUS) 0.5 % solution 1 drop 4 (Four) Times a Day As Needed for dry eyes.    Historical Provider, MD   Emollient (AQUAPHOR EX) Apply  topically Daily. To heels    Historical Provider, MD   enoxaparin (LOVENOX) 120 MG/0.8ML solution syringe Inject  under the skin Every 12 (Twelve) Hours.    Historical Provider, MD   FLUTICASONE PROPIONATE, NASAL, NA 50 mcg into each nostril Daily. 2 SPRAYS IN EACH NOSTRIL    Historical Provider, MD   insulin aspart (novoLOG) 100 UNIT/ML injection Inject 25 Units under the skin 3 (Three) Times a Day Before Meals.    Historical Provider, MD   insulin glargine (LANTUS) 100 UNIT/ML injection Inject 50 Units under the skin 2 (Two) Times a Day.    Historical Provider, MD   isosorbide dinitrate (ISORDIL) 20 MG tablet Take 1 tablet by mouth 2 (Two) Times a Day. 10/18/16   TEENA Garcia   lamoTRIgine (LaMICtal) 100 MG tablet Take 100 mg by mouth 2 (Two) Times a Day.    Historical Provider, MD   levETIRAcetam (KEPPRA) 500 MG tablet Take 3 tablets in the morning and 4  "tablets at bedtime  Patient taking differently: Take 3,500 mg by mouth Daily. Take 3 tablets in the morning and 4 tablets at bedtime 8/17/17   TEENA Castillo   lisinopril (PRINIVIL,ZESTRIL) 40 MG tablet Take 40 mg by mouth. Take a 1/2 tab at bedtime by mouth    Historical Provider, MD   metFORMIN (GLUCOPHAGE) 1000 MG tablet Take 1,000 mg by mouth 2 (Two) Times a Day With Meals.    Historical Provider, MD   metoprolol tartrate (LOPRESSOR) 50 MG tablet Take 1 tablet by mouth 2 (Two) Times a Day. 10/18/16   TEENA Garcia   Multiple Vitamin (MULTI VITAMIN PO) Take 1 tablet by mouth Daily.    Historical Provider, MD   naproxen sodium (ALEVE) 220 MG tablet Take 220 mg by mouth As Needed for Headache.    Historical Provider, MD   nitroglycerin (NITROSTAT) 0.4 MG SL tablet Place 0.4 mg under the tongue Every 5 (Five) Minutes As Needed for chest pain. Take no more than 3 doses in 15 minutes.    Historical Provider, MD   pantoprazole (PROTONIX) 40 MG EC tablet Take 40 mg by mouth 2 (Two) Times a Day.    Historical Provider, MD   psyllium (METAMUCIL) 58.6 % packet Take  by mouth Daily. 1 TABLESPOON    Historical Provider, MD   rosuvastatin (CRESTOR) 40 MG tablet Take 20 mg by mouth Daily.    Historical Provider, MD   triamterene-hydrochlorothiazide (MAXZIDE) 75-50 MG per tablet Take 1 tablet by mouth Daily.    Historical Provider, MD   urea (CARMOL) 40 % ointment Apply  topically Daily.    Historical Provider, MD       /97   Pulse 85   Temp 98.5 °F (36.9 °C) (Temporal Artery )   Resp 17   Ht 182.9 cm (72\")   Wt 102 kg (225 lb)   SpO2 94%   BMI 30.52 kg/m²     Objective   Physical Exam   Constitutional: He is oriented to person, place, and time. He appears well-developed and well-nourished.   HENT:   Head: Normocephalic and atraumatic.   Eyes: Conjunctivae and EOM are normal. Pupils are equal, round, and reactive to light.   Neck: Normal range of motion. Neck supple.   Cardiovascular: Normal rate, " regular rhythm, normal heart sounds and intact distal pulses.    Pulmonary/Chest: Effort normal and breath sounds normal.   Abdominal: Soft. Bowel sounds are normal.   Musculoskeletal: Normal range of motion.   Neurological: He is alert and oriented to person, place, and time. He has normal reflexes.   Skin: Skin is warm and dry.   Sl pallor noted   Psychiatric: He has a normal mood and affect. His behavior is normal. Judgment and thought content normal.   Nursing note and vitals reviewed.      Procedures         Lab Results (last 24 hours)     Procedure Component Value Units Date/Time    Blood Gas, Arterial [[id number]]  (Abnormal) Collected:  03/15/18 1422    Specimen:  Arterial Blood Updated:  03/15/18 1427     Site Right Brachial     Thien's Test N/A     pH, Arterial 7.440 pH units      pCO2, Arterial 33.8 (L) mm Hg      pO2, Arterial 73.6 (L) mm Hg      HCO3, Arterial 22.9 mmol/L      Base Excess, Arterial -0.6 (L) mmol/L      O2 Saturation, Arterial 94.7 %      Temperature 37.0 C      Barometric Pressure for Blood Gas 748 mmHg      Modality Room Air     Ventilator Mode NA     Collected by [id number]    Influenza Antigen, Rapid - Swab, Nasopharynx [[id number]]  (Normal) Collected:  03/15/18 1502    Specimen:  Swab from Nasopharynx Updated:  03/15/18 1536     Influenza A Ag, EIA Negative     Influenza B Ag, EIA Negative    Narrative:         Recommend confirmation of negative results by viral culture or molecular assay.    CBC & Differential [[id number]] Collected:  03/15/18 1505    Specimen:  Blood Updated:  03/15/18 1517    Narrative:       The following orders were created for panel order CBC & Differential.  Procedure                               Abnormality         Status                     ---------                               -----------         ------                     CBC Auto Differential[[id number]]        Abnormal            Final result                 Please view results for these tests on the  individual orders.    Comprehensive Metabolic Panel [[id number]]  (Abnormal) Collected:  03/15/18 1505    Specimen:  Blood from Arm, Right Updated:  03/15/18 1527     Glucose 173 (H) mg/dL      BUN 15 mg/dL      Creatinine 1.07 mg/dL      Sodium 136 mmol/L      Potassium 4.6 mmol/L      Chloride 96 (L) mmol/L      CO2 20.0 (L) mmol/L      Calcium 9.9 mg/dL      Total Protein 8.5 g/dL      Albumin 4.60 g/dL      ALT (SGPT) 64 (H) U/L      AST (SGOT) 44 U/L      Alkaline Phosphatase 85 U/L      Total Bilirubin 1.4 (H) mg/dL      eGFR Non African Amer 71 mL/min/1.73      Globulin 3.9 gm/dL      A/G Ratio 1.2 g/dL      BUN/Creatinine Ratio 14.0     Anion Gap 20.0 (H) mmol/L     Protime-INR [[id number]]  (Normal) Collected:  03/15/18 1505    Specimen:  Blood from Arm, Right Updated:  03/15/18 1545     Protime 12.9 Seconds      INR 0.94    aPTT [[id number]]  (Normal) Collected:  03/15/18 1505    Specimen:  Blood from Arm, Right Updated:  03/15/18 1545     PTT 26.9 seconds     Lipase [[id number]]  (Normal) Collected:  03/15/18 1505    Specimen:  Blood from Arm, Right Updated:  03/15/18 1527     Lipase 30 U/L     Amylase [[id number]]  (Normal) Collected:  03/15/18 1505    Specimen:  Blood from Arm, Right Updated:  03/15/18 1527     Amylase 65 U/L     BNP [[id number]]  (Normal) Collected:  03/15/18 1505    Specimen:  Blood from Arm, Right Updated:  03/15/18 1542     proBNP 197.0 pg/mL     D-dimer, Quantitative [[id number]]  (Abnormal) Collected:  03/15/18 1505    Specimen:  Blood from Arm, Right Updated:  03/15/18 1545     D-Dimer, Quantitative 0.67 (H) mg/L (FEU)     Narrative:       Reference Range is 0-0.50 mg/L FEU. However, results <0.50 mg/L FEU tends to rule out DVT or PE. Results >0.50 mg/L FEU are not useful in predicting absence or presence of DVT or PE.    Troponin [[id number]]  (Normal) Collected:  03/15/18 1505    Specimen:  Blood from Arm, Right Updated:  03/15/18 1542     Troponin I 0.016 ng/mL     Magnesium  [[id number]]  (Normal) Collected:  03/15/18 1505    Specimen:  Blood from Arm, Right Updated:  03/15/18 1527     Magnesium 2.0 mg/dL     CBC Auto Differential [[id number]]  (Abnormal) Collected:  03/15/18 1505    Specimen:  Blood from Arm, Right Updated:  03/15/18 1517     WBC 5.60 10*3/mm3      RBC 5.24 10*6/mm3      Hemoglobin 15.3 g/dL      Hematocrit 44.3 %      MCV 84.5 fL      MCH 29.2 pg      MCHC 34.5 g/dL      RDW 12.6 %      RDW-SD 38.1 (L) fl      MPV 11.0 fL      Platelets 148 10*3/mm3      Neutrophil % 64.8 %      Lymphocyte % 19.3 %      Monocyte % 13.6 (H) %      Eosinophil % 1.4 %      Basophil % 0.5 %      Immature Grans % 0.4 %      Neutrophils, Absolute 3.63 10*3/mm3      Lymphocytes, Absolute 1.08 10*3/mm3      Monocytes, Absolute 0.76 10*3/mm3      Eosinophils, Absolute 0.08 10*3/mm3      Basophils, Absolute 0.03 10*3/mm3      Immature Grans, Absolute 0.02 10*3/mm3      nRBC 0.0 /100 WBC     Urinalysis With / Culture If Indicated - Urine, Clean Catch [[id number]]  (Abnormal) Collected:  03/15/18 1602    Specimen:  Urine from Urine, Clean Catch Updated:  03/15/18 1631     Color, UA Yellow     Appearance, UA Cloudy (A)     pH, UA 6.5     Specific Gravity, UA 1.021     Glucose,  mg/dL (Trace) (A)     Ketones, UA Trace (A)     Bilirubin, UA Negative     Blood, UA Negative     Protein, UA 30 mg/dL (1+) (A)     Leuk Esterase, UA Negative     Nitrite, UA Negative     Urobilinogen, UA 2.0 E.U./dL (A)    Urinalysis, Microscopic Only - Urine, Clean Catch [[id number]]  (Abnormal) Collected:  03/15/18 1602    Specimen:  Urine from Urine, Clean Catch Updated:  03/15/18 1631     RBC, UA 6-12 (A) /HPF      WBC, UA 0-2 (A) /HPF      Bacteria, UA None Seen /HPF      Squamous Epithelial Cells, UA 0-2 /HPF      Hyaline Casts, UA 3-6 /LPF      Methodology Automated Microscopy          CT Angiogram Chest With Contrast   Final Result      CT Head Without Contrast   ED Interpretation   1. No acute intracranial  process.   2. Evidence of chronic sinusitis.           This report was finalized on 03/15/2018 15:34 by Dr. Delio Langston MD.      Final Result   1. No acute intracranial process.   2. Evidence of chronic sinusitis.           This report was finalized on 03/15/2018 15:34 by Dr. Delio Langston MD.      XR Chest 1 View   ED Interpretation   COPD. No acute cardiopulmonary abnormality.   This report was finalized on 03/15/2018 14:42 by Dr. Cyril Reyna MD.      Final Result   COPD. No acute cardiopulmonary abnormality.   This report was finalized on 03/15/2018 14:42 by Dr. Cyril Reyna MD.          ED Course  ED Course   Comment By Time   Pending cta of chest at this time.  Yumiko Mosqueda, APRN 03/15 1654   Reviewed pt with dr lemus- has accepted for hospitalist services.  Yumiko Mosqueda, APRN 03/15 1705          MDM  Number of Diagnoses or Management Options  Syncope and collapse: new and requires workup     Amount and/or Complexity of Data Reviewed  Clinical lab tests: ordered and reviewed  Tests in the radiology section of CPT®: ordered and reviewed  Independent visualization of images, tracings, or specimens: yes    Patient Progress  Patient progress: stable      Final diagnoses:   Syncope and collapse          Yumiko Mosqueda, TEENA  03/15/18 [id number]

## 2018-03-15 NOTE — ED NOTES
PT REPORTS TWO SYNCOPAL EPISODES THIS MORNING. PT STATES HE WAS WALKING INTO KITCHEN AT HOME, FELT DIZZY, AND HELD ONTO THE REFRIGERATOR SO HE DIDN'T FALL. PT STATES HE WOKE UP IN FLOOR WITH HIS DOG LICKING HIS FACE, AND HAD VOMITED. PT ALSO C/O PRODUCTIVE COUGH WITH GREEN SPUTUM X 1 WEEK, AND HAS SEEN PCP FOR THESE ISSUES AND WAS PRESCRIBED MEDICATION. PT LUNG SOUNDS DIMINISHED, WITH LOOSE COUGH. PT ALERT AND ORIENTED.      Judith Rogel RN  03/15/18 [id number]

## 2018-03-16 ENCOUNTER — APPOINTMENT (OUTPATIENT)
Dept: CARDIOLOGY | Facility: HOSPITAL | Age: 58
End: 2018-03-16

## 2018-03-16 ENCOUNTER — APPOINTMENT (OUTPATIENT)
Dept: ULTRASOUND IMAGING | Facility: HOSPITAL | Age: 58
End: 2018-03-16

## 2018-03-16 LAB
ANION GAP SERPL CALCULATED.3IONS-SCNC: 15 MMOL/L (ref 4–13)
BH CV ECHO MEAS - AI DEC SLOPE: 243.5 CM/SEC^2
BH CV ECHO MEAS - AI MAX PG: 72.3 MMHG
BH CV ECHO MEAS - AI MAX VEL: 425 CM/SEC
BH CV ECHO MEAS - AI P1/2T: 511.2 MSEC
BH CV ECHO MEAS - AO MAX PG (FULL): 8 MMHG
BH CV ECHO MEAS - AO MAX PG: 18.8 MMHG
BH CV ECHO MEAS - AO MEAN PG (FULL): 5 MMHG
BH CV ECHO MEAS - AO MEAN PG: 12 MMHG
BH CV ECHO MEAS - AO ROOT AREA (BSA CORRECTED): 1.3
BH CV ECHO MEAS - AO ROOT AREA: 6.6 CM^2
BH CV ECHO MEAS - AO ROOT DIAM: 2.9 CM
BH CV ECHO MEAS - AO V2 MAX: 217 CM/SEC
BH CV ECHO MEAS - AO V2 MEAN: 161 CM/SEC
BH CV ECHO MEAS - AO V2 VTI: 45.5 CM
BH CV ECHO MEAS - AVA(I,A): 2.5 CM^2
BH CV ECHO MEAS - AVA(I,D): 2.5 CM^2
BH CV ECHO MEAS - AVA(V,A): 2.4 CM^2
BH CV ECHO MEAS - AVA(V,D): 2.4 CM^2
BH CV ECHO MEAS - BSA(HAYCOCK): 2.4 M^2
BH CV ECHO MEAS - BSA: 2.3 M^2
BH CV ECHO MEAS - BZI_BMI: 31.7 KILOGRAMS/M^2
BH CV ECHO MEAS - BZI_METRIC_HEIGHT: 182.9 CM
BH CV ECHO MEAS - BZI_METRIC_WEIGHT: 106.1 KG
BH CV ECHO MEAS - CONTRAST EF 4CH: 61.1 ML/M^2
BH CV ECHO MEAS - EDV(CUBED): 123.5 ML
BH CV ECHO MEAS - EDV(MOD-SP4): 152 ML
BH CV ECHO MEAS - EDV(TEICH): 117.1 ML
BH CV ECHO MEAS - EF(CUBED): 71.9 %
BH CV ECHO MEAS - EF(MOD-SP4): 61.1 %
BH CV ECHO MEAS - EF(TEICH): 63.4 %
BH CV ECHO MEAS - ESV(CUBED): 34.6 ML
BH CV ECHO MEAS - ESV(MOD-SP4): 59.1 ML
BH CV ECHO MEAS - ESV(TEICH): 42.8 ML
BH CV ECHO MEAS - FS: 34.5 %
BH CV ECHO MEAS - IVS/LVPW: 1.3
BH CV ECHO MEAS - IVSD: 1.2 CM
BH CV ECHO MEAS - LA DIMENSION: 3.9 CM
BH CV ECHO MEAS - LA/AO: 1.3
BH CV ECHO MEAS - LV DIASTOLIC VOL/BSA (35-75): 66.7 ML/M^2
BH CV ECHO MEAS - LV MASS(C)D: 193.9 GRAMS
BH CV ECHO MEAS - LV MASS(C)DI: 85.1 GRAMS/M^2
BH CV ECHO MEAS - LV MAX PG: 10.8 MMHG
BH CV ECHO MEAS - LV MEAN PG: 7 MMHG
BH CV ECHO MEAS - LV SYSTOLIC VOL/BSA (12-30): 26 ML/M^2
BH CV ECHO MEAS - LV V1 MAX: 164.5 CM/SEC
BH CV ECHO MEAS - LV V1 MEAN: 124 CM/SEC
BH CV ECHO MEAS - LV V1 VTI: 36.1 CM
BH CV ECHO MEAS - LVIDD: 5 CM
BH CV ECHO MEAS - LVIDS: 3.3 CM
BH CV ECHO MEAS - LVLD AP4: 10 CM
BH CV ECHO MEAS - LVLS AP4: 8.5 CM
BH CV ECHO MEAS - LVOT AREA (M): 3.1 CM^2
BH CV ECHO MEAS - LVOT AREA: 3.1 CM^2
BH CV ECHO MEAS - LVOT DIAM: 2 CM
BH CV ECHO MEAS - LVPWD: 0.91 CM
BH CV ECHO MEAS - MR ALIAS VEL: 30.8 CM/SEC
BH CV ECHO MEAS - MR ERO: 0.06 CM^2
BH CV ECHO MEAS - MR FLOW RATE: 31 CM^3/SEC
BH CV ECHO MEAS - MR MAX PG: 115.8 MMHG
BH CV ECHO MEAS - MR MAX VEL: 538 CM/SEC
BH CV ECHO MEAS - MR MEAN PG: 72 MMHG
BH CV ECHO MEAS - MR MEAN VEL: 402 CM/SEC
BH CV ECHO MEAS - MR PISA RADIUS: 0.4 CM
BH CV ECHO MEAS - MR PISA: 1 CM^2
BH CV ECHO MEAS - MR VOLUME: 10.5 ML
BH CV ECHO MEAS - MR VTI: 183 CM
BH CV ECHO MEAS - MV A MAX VEL: 106 CM/SEC
BH CV ECHO MEAS - MV DEC TIME: 0.23 SEC
BH CV ECHO MEAS - MV E MAX VEL: 71.3 CM/SEC
BH CV ECHO MEAS - MV E/A: 0.67
BH CV ECHO MEAS - PA MAX PG: 2.3 MMHG
BH CV ECHO MEAS - PA V2 MAX: 75.2 CM/SEC
BH CV ECHO MEAS - RAP SYSTOLE: 5 MMHG
BH CV ECHO MEAS - RVSP: 9.2 MMHG
BH CV ECHO MEAS - SI(AO): 132 ML/M^2
BH CV ECHO MEAS - SI(CUBED): 39 ML/M^2
BH CV ECHO MEAS - SI(LVOT): 49.8 ML/M^2
BH CV ECHO MEAS - SI(MOD-SP4): 40.8 ML/M^2
BH CV ECHO MEAS - SI(TEICH): 32.6 ML/M^2
BH CV ECHO MEAS - SV(AO): 300.5 ML
BH CV ECHO MEAS - SV(CUBED): 88.9 ML
BH CV ECHO MEAS - SV(LVOT): 113.4 ML
BH CV ECHO MEAS - SV(MOD-SP4): 92.9 ML
BH CV ECHO MEAS - SV(TEICH): 74.3 ML
BH CV ECHO MEAS - TR MAX VEL: 102 CM/SEC
BUN BLD-MCNC: 23 MG/DL (ref 5–21)
BUN/CREAT SERPL: 20.4 (ref 7–25)
CALCIUM SPEC-SCNC: 9.4 MG/DL (ref 8.4–10.4)
CHLORIDE SERPL-SCNC: 97 MMOL/L (ref 98–110)
CO2 SERPL-SCNC: 24 MMOL/L (ref 24–31)
CREAT BLD-MCNC: 1.13 MG/DL (ref 0.5–1.4)
DEPRECATED RDW RBC AUTO: 39.2 FL (ref 40–54)
E/E' RATIO: 17.3
ERYTHROCYTE [DISTWIDTH] IN BLOOD BY AUTOMATED COUNT: 12.7 % (ref 12–15)
GFR SERPL CREATININE-BSD FRML MDRD: 66 ML/MIN/1.73
GLUCOSE BLD-MCNC: 211 MG/DL (ref 70–100)
GLUCOSE BLDC GLUCOMTR-MCNC: 178 MG/DL (ref 70–130)
GLUCOSE BLDC GLUCOMTR-MCNC: 242 MG/DL (ref 70–130)
GLUCOSE BLDC GLUCOMTR-MCNC: 250 MG/DL (ref 70–130)
HBA1C MFR BLD: 9.6 %
HCT VFR BLD AUTO: 39.6 % (ref 40–52)
HGB BLD-MCNC: 13.8 G/DL (ref 14–18)
LEFT ATRIUM VOLUME INDEX: 21.3 ML/M2
LEFT ATRIUM VOLUME: 48.5 CM3
LV EF 2D ECHO EST: 60 %
MAXIMAL PREDICTED HEART RATE: 161 BPM
MCH RBC QN AUTO: 29.6 PG (ref 28–32)
MCHC RBC AUTO-ENTMCNC: 34.8 G/DL (ref 33–36)
MCV RBC AUTO: 85 FL (ref 82–95)
PLATELET # BLD AUTO: 120 10*3/MM3 (ref 130–400)
PMV BLD AUTO: 11.6 FL (ref 6–12)
POTASSIUM BLD-SCNC: 3.9 MMOL/L (ref 3.5–5.3)
RBC # BLD AUTO: 4.66 10*6/MM3 (ref 4.8–5.9)
SODIUM BLD-SCNC: 136 MMOL/L (ref 135–145)
STRESS TARGET HR: 137 BPM
WBC NRBC COR # BLD: 4.02 10*3/MM3 (ref 4.8–10.8)

## 2018-03-16 PROCEDURE — 63710000001 INSULIN LISPRO (HUMAN) PER 5 UNITS: Performed by: NURSE PRACTITIONER

## 2018-03-16 PROCEDURE — 96372 THER/PROPH/DIAG INJ SC/IM: CPT

## 2018-03-16 PROCEDURE — G0378 HOSPITAL OBSERVATION PER HR: HCPCS

## 2018-03-16 PROCEDURE — 93306 TTE W/DOPPLER COMPLETE: CPT | Performed by: INTERNAL MEDICINE

## 2018-03-16 PROCEDURE — 63710000001 INSULIN DETEMIR PER 5 UNITS: Performed by: INTERNAL MEDICINE

## 2018-03-16 PROCEDURE — 87070 CULTURE OTHR SPECIMN AEROBIC: CPT | Performed by: NURSE PRACTITIONER

## 2018-03-16 PROCEDURE — 85027 COMPLETE CBC AUTOMATED: CPT | Performed by: NURSE PRACTITIONER

## 2018-03-16 PROCEDURE — 93306 TTE W/DOPPLER COMPLETE: CPT

## 2018-03-16 PROCEDURE — 25010000002 PERFLUTREN 6.52 MG/ML SUSPENSION: Performed by: INTERNAL MEDICINE

## 2018-03-16 PROCEDURE — 87205 SMEAR GRAM STAIN: CPT | Performed by: NURSE PRACTITIONER

## 2018-03-16 PROCEDURE — 25010000002 METHYLPREDNISOLONE PER 125 MG: Performed by: NURSE PRACTITIONER

## 2018-03-16 PROCEDURE — 82962 GLUCOSE BLOOD TEST: CPT

## 2018-03-16 PROCEDURE — 80048 BASIC METABOLIC PNL TOTAL CA: CPT | Performed by: NURSE PRACTITIONER

## 2018-03-16 PROCEDURE — 93880 EXTRACRANIAL BILAT STUDY: CPT

## 2018-03-16 PROCEDURE — 93880 EXTRACRANIAL BILAT STUDY: CPT | Performed by: SURGERY

## 2018-03-16 PROCEDURE — 96375 TX/PRO/DX INJ NEW DRUG ADDON: CPT

## 2018-03-16 PROCEDURE — 83036 HEMOGLOBIN GLYCOSYLATED A1C: CPT | Performed by: NURSE PRACTITIONER

## 2018-03-16 PROCEDURE — 25010000002 ENOXAPARIN PER 10 MG: Performed by: NURSE PRACTITIONER

## 2018-03-16 RX ORDER — DEXTROMETHORPHAN POLISTIREX 30 MG/5ML
60 SUSPENSION ORAL EVERY 12 HOURS SCHEDULED
Status: DISCONTINUED | OUTPATIENT
Start: 2018-03-16 | End: 2018-03-17 | Stop reason: HOSPADM

## 2018-03-16 RX ORDER — AZITHROMYCIN 250 MG/1
250 TABLET, FILM COATED ORAL DAILY
Status: DISCONTINUED | OUTPATIENT
Start: 2018-03-17 | End: 2018-03-17 | Stop reason: HOSPADM

## 2018-03-16 RX ORDER — METHYLPREDNISOLONE SODIUM SUCCINATE 125 MG/2ML
60 INJECTION, POWDER, LYOPHILIZED, FOR SOLUTION INTRAMUSCULAR; INTRAVENOUS ONCE
Status: COMPLETED | OUTPATIENT
Start: 2018-03-16 | End: 2018-03-16

## 2018-03-16 RX ORDER — AZITHROMYCIN 250 MG/1
500 TABLET, FILM COATED ORAL DAILY
Status: COMPLETED | OUTPATIENT
Start: 2018-03-16 | End: 2018-03-16

## 2018-03-16 RX ADMIN — LAMOTRIGINE 200 MG: 100 TABLET ORAL at 09:39

## 2018-03-16 RX ADMIN — AZITHROMYCIN 500 MG: 250 TABLET, FILM COATED ORAL at 18:18

## 2018-03-16 RX ADMIN — INSULIN LISPRO 2 UNITS: 100 INJECTION, SOLUTION INTRAVENOUS; SUBCUTANEOUS at 11:18

## 2018-03-16 RX ADMIN — ISOSORBIDE DINITRATE 20 MG: 20 TABLET ORAL at 20:12

## 2018-03-16 RX ADMIN — METOPROLOL TARTRATE 50 MG: 50 TABLET, FILM COATED ORAL at 11:19

## 2018-03-16 RX ADMIN — DEXTROMETHORPHAN POLISTIREX 60 MG: 30 SUSPENSION ORAL at 20:14

## 2018-03-16 RX ADMIN — LISINOPRIL 20 MG: 20 TABLET ORAL at 20:13

## 2018-03-16 RX ADMIN — INSULIN DETEMIR 65 UNITS: 100 INJECTION, SOLUTION SUBCUTANEOUS at 20:11

## 2018-03-16 RX ADMIN — INSULIN LISPRO 4 UNITS: 100 INJECTION, SOLUTION INTRAVENOUS; SUBCUTANEOUS at 20:10

## 2018-03-16 RX ADMIN — FLUTICASONE PROPIONATE 2 SPRAY: 50 SPRAY, METERED NASAL at 09:38

## 2018-03-16 RX ADMIN — LEVETIRACETAM 1500 MG: 500 TABLET, FILM COATED ORAL at 09:38

## 2018-03-16 RX ADMIN — PANTOPRAZOLE SODIUM 40 MG: 40 TABLET, DELAYED RELEASE ORAL at 05:29

## 2018-03-16 RX ADMIN — LEVETIRACETAM 2000 MG: 500 TABLET, FILM COATED ORAL at 20:13

## 2018-03-16 RX ADMIN — INSULIN LISPRO 3 UNITS: 100 INJECTION, SOLUTION INTRAVENOUS; SUBCUTANEOUS at 18:18

## 2018-03-16 RX ADMIN — GUAIFENESIN 1200 MG: 600 TABLET, EXTENDED RELEASE ORAL at 09:38

## 2018-03-16 RX ADMIN — ACETAMINOPHEN 650 MG: 325 TABLET ORAL at 21:06

## 2018-03-16 RX ADMIN — LAMOTRIGINE 200 MG: 100 TABLET ORAL at 20:13

## 2018-03-16 RX ADMIN — DEXTROMETHORPHAN HBR AND GUAIFENESIN 5 ML: 10; 100 SOLUTION ORAL at 11:19

## 2018-03-16 RX ADMIN — GUAIFENESIN 1200 MG: 600 TABLET, EXTENDED RELEASE ORAL at 20:13

## 2018-03-16 RX ADMIN — ENOXAPARIN SODIUM 40 MG: 40 INJECTION SUBCUTANEOUS at 20:12

## 2018-03-16 RX ADMIN — ASPIRIN 81 MG: 81 TABLET, CHEWABLE ORAL at 09:38

## 2018-03-16 RX ADMIN — METHYLPREDNISOLONE SODIUM SUCCINATE 60 MG: 125 INJECTION, POWDER, FOR SOLUTION INTRAMUSCULAR; INTRAVENOUS at 18:17

## 2018-03-16 RX ADMIN — ISOSORBIDE DINITRATE 20 MG: 20 TABLET ORAL at 09:38

## 2018-03-16 RX ADMIN — PERFLUTREN 9.78 MG: 6.52 INJECTION, SUSPENSION INTRAVENOUS at 08:10

## 2018-03-16 RX ADMIN — METOPROLOL TARTRATE 50 MG: 50 TABLET, FILM COATED ORAL at 20:13

## 2018-03-16 NOTE — PROGRESS NOTES
[facility] Medicine Services  INPATIENT PROGRESS NOTE    Length of Stay: 0  Date of Admission: 3/15/2018  Primary Care Physician: GINA Felder    Subjective   Chief Complaint: Follow-up syncope  HPI   Lying in bed.  No family present.  He denies nausea, vomiting or abdominal pain.  He continues to have cough.  Occasional production of green sputum.  He denies nausea, vomiting or abdominal pain.  No syncopal episode or lightheadedness since admission.  He has remained normal sinus rhythm on telemetry.  No significant blood pressure changes lying, sitting, standing.    Review of Systems   Constitutional: Negative for activity change, fatigue and fever.   HENT: Positive for congestion. Negative for trouble swallowing.    Eyes: Negative for photophobia and visual disturbance.   Respiratory: Positive for cough. Negative for shortness of breath, wheezing and stridor.    Cardiovascular: Negative for chest pain, palpitations and leg swelling.   Gastrointestinal: Positive for nausea. Negative for constipation, diarrhea and vomiting.   Endocrine: Negative for cold intolerance, heat intolerance and polyuria.   Genitourinary: Negative for dysuria and hematuria.   Musculoskeletal: Negative for gait problem.   Skin: Negative for wound.   Neurological: Positive for syncope and weakness.   Hematological: Negative for adenopathy. Does not bruise/bleed easily.   Psychiatric/Behavioral: Negative for agitation, behavioral problems and confusion.      All pertinent negatives and positives are as above. All other systems have been reviewed and are negative unless otherwise stated.     Objective    Temp:  [97.1 °F (36.2 °C)-98.8 °F (37.1 °C)] 97.3 °F (36.3 °C)  Heart Rate:  [58-96] 66  Resp:  [17-20] 20  BP: (104-156)/() 111/69  Physical Exam  /69 (BP Location: Right arm, Patient Position: Standing)   Pulse 66   Temp 97.3 °F (36.3 °C) (Temporal Artery )   Resp 20   Ht 182.9 cm  "(72\")   Wt 107 kg (234 lb 12.8 oz)   SpO2 93%   BMI 31.84 kg/m²   Physical Exam   Constitutional: He is oriented to person, place, and time. He appears well-developed and well-nourished.   HENT:   Head: Normocephalic and atraumatic.   Eyes: EOM are normal. Pupils are equal, round, and reactive to light.   Neck: Normal range of motion. Neck supple. No thyromegaly present.   Cardiovascular: Normal rate, regular rhythm, normal heart sounds and intact distal pulses.  Exam reveals no gallop and no friction rub.    Normal sinus rhythm 64-94 on telemetry   Pulmonary/Chest: Effort normal and breath sounds normal. No respiratory distress. He has no wheezes. He has no rales. He exhibits no tenderness.   Abdominal: Soft. Bowel sounds are normal. He exhibits no distension. There is no tenderness.   Genitourinary:   Genitourinary Comments: Deferred.  Voiding without difficulty.   Musculoskeletal: Normal range of motion. He exhibits no edema or deformity.   Neurological: He is oriented to person, place, and time.   Skin: Skin is warm and dry.   Psychiatric: He has a normal mood and affect. His behavior is normal. Judgment normal.     Results Review:  I have reviewed the labs, radiology results, and diagnostic studies.    Laboratory Data:     Results from last 7 days  Lab Units 03/16/18  0245 03/15/18  1505   WBC 10*3/mm3 4.02* 5.60   HEMOGLOBIN g/dL 13.8* 15.3   HEMATOCRIT % 39.6* 44.3   PLATELETS 10*3/mm3 120* 148          Results from last 7 days  Lab Units 03/16/18  0245 03/15/18  1505   SODIUM mmol/L 136 136   POTASSIUM mmol/L 3.9 4.6   CHLORIDE mmol/L 97* 96*   CO2 mmol/L 24.0 20.0*   BUN mg/dL 23* 15   CREATININE mg/dL 1.13 1.07   CALCIUM mg/dL 9.4 9.9   BILIRUBIN mg/dL  --  1.4*   ALK PHOS U/L  --  85   ALT (SGPT) U/L  --  64*   AST (SGOT) U/L  --  44   GLUCOSE mg/dL 211* 173*        Imaging Results (all)     Procedure Component Value Units Date/Time    US Carotid Bilateral [[id number]] Collected:  03/16/18 1204     " Updated:  03/16/18 1208    Narrative:       History: Carotid occlusive disease       Impression:       Impression:  1. There is less than 50% stenosis of the right internal carotid artery.  2. There is less than 50% stenosis of the left internal carotid artery.  3. Antegrade flow is demonstrated in bilateral vertebral arteries.     Comments: Bilateral carotid vertebral arterial duplex scan was  performed.     Grayscale imaging shows intimal thickening and calcified elements at the  carotid bifurcation. The right internal carotid artery peak systolic  velocity is 97.9 cm/sec. The end-diastolic velocity is 35.8 cm/sec. The  right ICA/CCA ratio is approximately 1.02 . These findings correlate  with less than 50% stenosis of the right internal carotid artery.     Grayscale imaging shows intimal thickening and calcified elements at the  carotid bifurcation. The left internal carotid artery peak systolic  velocity is 119 cm/sec. The end-diastolic velocity is 36.1 cm/sec. The  left ICA/CCA ratio is approximately 0.86 . These findings correlate with  less than 50% stenosis of the left internal carotid artery.     Antegrade flow is demonstrated in bilateral vertebral arteries.  There is greater than 50% stenosis of the left common carotid artery and  the left external carotid artery.  This report was finalized on 03/16/2018 12:05 by Dr. Chetan Echavarria MD.    CT Angiogram Chest With Contrast [[id number]] Collected:  03/15/18 1836     Updated:  03/15/18 1841    Narrative:       EXAMINATION: CT ANGIOGRAM CHEST W CONTRAST-      3/15/2018 5:36 PM CDT     HISTORY: syncope/ sob/ elevated dimer; R55-Syncope and collapse     In order to have a CT radiation dose as low as reasonably achievable  Automated Exposure Control was utilized for adjustment of the mA and/or  KV according to patient size.     DLP in mGycm= 1147.     CT angiography protocol.   CT imaging with bolus IV contrast injection.   Under concurrent supervision axial,  sagittal, coronal, and  three-dimensional data sets were constructed.     Normal heart size.  Extensive coronary artery calcification.     No mediastinal mass.     No thoracic aortic aneurysm or dissection.     Symmetric pulmonary arteries.  No pulmonary embolism.     The lungs are fully expanded and clear. No infiltrate or effusion.     No abnormality is seen within the upper abdomen.     Summary:  1. No pulmonary embolism.  2. No acute lung disease.   This report was finalized on 03/15/2018 18:38 by Dr. Celso Wynn MD.    CT Head Without Contrast [[id number]] Collected:  03/15/18 1533     Updated:  03/15/18 1553    Narrative:       CT HEAD WO CONTRAST- 3/15/2018 2:44 PM CDT     HISTORY: syncope       COMPARISON: 1/8/2018      DOSE LENGTH PRODUCT: 639 mGy cm. Automated exposure control was also  utilized to decrease patient radiation dose.     TECHNIQUE: Helical tomographic images of the brain were obtained without  the use of intravenous contrast.      FINDINGS:   Hemorrhage: None.     Mass effect: No midline shift or mass effect.     Ventricles: Normal and symmetric without hydrocephalus.     Basilar cisterns: Normal.     Sulci: Normal in configuration. No sulcal effacement.     Extra-axial fluid: No abnormal extra-axial fluid collections.     Grey and white matter differentiation: Normal.     Posterior fossa and brainstem: Normal.     Bones: No fractures or lesions.     Soft tissues: No acute process.     Sinuses and mastoid air cells: Postsurgical changes are seen in the  sinuses. Chronic sinusitis is noted.       Impression:       1. No acute intracranial process.  2. Evidence of chronic sinusitis.        This report was finalized on 03/15/2018 15:34 by Dr. Delio Langston MD.    XR Chest 1 View [[id number]] Collected:  03/15/18 1441     Updated:  03/15/18 1553    Narrative:       EXAMINATION: XR CHEST 1 VW- 3/15/2018 2:41 PM CDT     HISTORY: Syncope. COPD.     REPORT: Comparison is made with the study from  11/28/2017.     Lungs are hyperinflated, no infiltrate or consolidation is identified.  There is borderline cardiomegaly without evidence of CHF. Ectasia of the  thoracic aorta is present. No pneumothorax or pleural effusion is  identified. Degenerative spurring is seen throughout the thoracic spine  and at the sternocostal junction. Healed rib fractures are present on  the left.       Impression:       COPD. No acute cardiopulmonary abnormality.  This report was finalized on 03/15/2018 14:42 by Dr. Cyril Reyna MD.        Transthoracic echocardiogram 3/16/18  · Left ventricular systolic function is normal. Estimated EF = 60%.  · Mild to moderate aortic valve regurgitation is present.  · Left ventricular diastolic dysfunction.  · No evidence of pulmonary hypertension is present.  · No evidence of a patent foramen ovale. Saline test results are negative  Intake/Output    Intake/Output Summary (Last 24 hours) at 03/16/18 1505  Last data filed at 03/16/18 0944   Gross per 24 hour   Intake                0 ml   Output                0 ml   Net                0 ml       Scheduled Meds    aspirin 81 mg Oral Daily   azithromycin 500 mg Oral Daily   Followed by      [START ON 3/17/2018] azithromycin 250 mg Oral Daily   dextromethorphan polistirex ER 60 mg Oral Q12H   docusate sodium 100 mg Oral BID   enoxaparin 40 mg Subcutaneous Q24H   fluticasone 2 spray Each Nare Daily   guaiFENesin 1,200 mg Oral Q12H   insulin detemir 65 Units Subcutaneous Nightly   insulin lispro 2-7 Units Subcutaneous 4x Daily With Meals & Nightly   isosorbide dinitrate 20 mg Oral Q12H   lamoTRIgine 200 mg Oral Q12H   levETIRAcetam 1,500 mg Oral Daily   levETIRAcetam 2,000 mg Oral Nightly   lisinopril 20 mg Oral Nightly   methylPREDNISolone sodium succinate 60 mg Intravenous Once   metoprolol tartrate 50 mg Oral Q12H   pantoprazole 40 mg Oral Q AM       I have reviewed the patient current medications.     Assessment/Plan     Hospital Problem List   "   Syncope and collapse        Assessment:  1.  Syncope and collapse etiology undetermined  2.  Carotid occlusive disease  3.  Coronary artery disease with history of multiple cardiac stents  4.  Palpitations  5.  Hypertension  6.  Insulin dependent diabetes mellitus  7.  Seizure disorder  8.  Mild to moderate aortic valve regurgitation per echo 3/16/18    Plan:  1.  Telemetry.  Patient remains normal sinus rhythm without ectopy.  Heart rate 64-94.  2.  Noninvasive carotids less than 50% stenosis bilaterally  3.  Echocardiogram ejection fraction 60%.  No PFO.  Saline test negative.  Mild to moderate aortic valve regurgitation.  Discussed with Dr. Ramey.  Patient to keep scheduled appointment May/14 unless he develops symptoms.  4.  Reductive cough green sputum.  We will check sputum culture.  5.  Solu-Medrol 60 mg IV ×1 dose.  Will place on tapering dose of prednisone tomorrow.  6.  Azithromycin Z-Humphrey.  CT head showed evidence of chronic sinusitis.  Patient denies fever chills.  Productive cough likely sinusitis.  Chest x-ray unremarkable.  7.  Orthostatic blood pressure checks without significant drop in blood pressure.  8.  Monitor rhythm another 24 hours.      The above documentation resulted from a face-to-face encounter by me Meghan DICKINSON, [facility].    Patient seen and examined. Discussed with NP. Plan on DC today but patient coughing up yellow phlegm. States that he is worried he will continue to cough and have another \"spell\". Lungs congested. Will add antibiotics and steroids and culture sputum. PRN cough medication.  Hopeful to DC tomorrow.     Discharge Planning: I expect patient to be discharged to home in 1 day.    TEENA Rowe   03/16/18   3:05 PM      "

## 2018-03-16 NOTE — DISCHARGE SUMMARY
[facility]  DISCHARGE SUMMARY       Date of Admission: 3/15/2018  Date of Discharge: 3/17/2018  Primary Care Physician: GINA Felder    Discharge Diagnoses:  Hospital Problem List     Syncope and collapse        Discharge diagnoses   1.  Syncope and collapse etiology undetermined, likely related to extreme coughing spell  2.  Carotid occlusive disease  3.  Coronary artery disease with history of multiple cardiac stents  4.  Palpitations  5.  Hypertension  6.  Insulin dependent diabetes mellitus  7.  Seizure disorder  8.  Mild to moderate aortic valve regurgitation per echo 3/16/18  Presenting Problem/History of Present Illness:  Syncope and collapse [R55]     Chief Complaint on Day of Discharge: No Complaints.  History of Present Illness on Day of Discharge:   Sitting up on side of bed.  No family present.  He denies nausea, vomiting or abdominal pain.  He denies chest pain or palpitations.  He reports one episode of feeling lightheaded after having extreme coughing spell approximate 4 AM this morning.  No arrhythmias noted on telemetry.  He has remained normal sinus rhythm 55-78.  Yesterday,  he reported green sputum production.  Today, sputum is clearer in color.  He feels better and is ready for discharge.    Consults: None    Procedures Performed: None    Pertinent Test Results:   Laboratory Data:      Results from last 7 days  Lab Units 03/16/18  0245 03/15/18  1505   WBC 10*3/mm3 4.02* 5.60   HEMOGLOBIN g/dL 13.8* 15.3   HEMATOCRIT % 39.6* 44.3   PLATELETS 10*3/mm3 120* 148          Results from last 7 days  Lab Units 03/16/18  0245 03/15/18  1505   SODIUM mmol/L 136 136   POTASSIUM mmol/L 3.9 4.6   CHLORIDE mmol/L 97* 96*   CO2 mmol/L 24.0 20.0*   BUN mg/dL 23* 15   CREATININE mg/dL 1.13 1.07   CALCIUM mg/dL 9.4 9.9   BILIRUBIN mg/dL  --  1.4*   ALK PHOS U/L  --  85   ALT (SGPT) U/L  --  64*   AST (SGOT) U/L  --  44   GLUCOSE mg/dL 211* 173*       Specimen: Urine from Urine, Clean Catch Updated: 03/15/18 1631    Color, UA Yellow    Appearance, UA Cloudy (A)    pH, UA 6.5    Specific Gravity, UA 1.021    Glucose,  mg/dL (Trace) (A)    Ketones, UA Trace (A)    Bilirubin, UA Negative    Blood, UA Negative    Protein, UA 30 mg/dL (1+) (A)    Leuk Esterase, UA Negative    Nitrite, UA Negative    Urobilinogen, UA 2.0 E.U./dL (A)   Urinalysis, Microscopic Only - Urine, Clean Catch [[id number]] (Abnormal) Collected: 03/15/18 1602   Lab Status: Final result Specimen: Urine from Urine, Clean Catch Updated: 03/15/18 1631    RBC, UA 6-12 (A) /HPF     WBC, UA 0-2 (A) /HPF     Bacteria, UA None Seen /HPF     Squamous Epithelial Cells, UA 0-2 /HPF     Hyaline Casts, UA 3-6 /LPF     Methodology Automated Microscopy      Specimen: Blood from Arm, Right Updated: 03/15/18 1545    Protime 12.9 Seconds     INR 0.94   aPTT [[id number]] (Normal) Collected: 03/15/18 1505   Lab Status: Final result Specimen: Blood from Arm, Right Updated: 03/15/18 1545    PTT 26.9 seconds    Lipase [[id number]] (Normal) Collected: 03/15/18 1505   Lab Status: Final result Specimen: Blood from Arm, Right Updated: 03/15/18 1527    Lipase 30 U/L    Amylase [[id number]] (Normal) Collected: 03/15/18 1505   Lab Status: Final result Specimen: Blood from Arm, Right Updated: 03/15/18 1527    Amylase 65 U/L       Specimen: Blood from Arm, Right Updated: 03/15/18 1542    proBNP 197.0 pg/mL    D-dimer, Quantitative [[id number]] (Abnormal) Collected: 03/15/18 1505   Lab Status: Final result Specimen: Blood from Arm, Right Updated: 03/15/18 1545    D-Dimer, Quantitative 0.67 (H) mg/L (FEU)    Narrative:     Reference Range is 0-0.50 mg/L FEU. However, results <0.50 mg/L FEU tends to rule out DVT or PE. Results >0.50 mg/L FEU are not useful in predicting absence or presence of DVT or PE.   Troponin [[id number]] (Normal) Collected: 03/15/18 1505   Lab Status: Final result Specimen: Blood from Arm, Right Updated:  03/15/18 1542    Troponin I 0.016 ng/mL    Magnesium [[id number]] (Normal) Collected: 03/15/18 1505   Lab Status: Final result Specimen: Blood from Arm, Right Updated: 03/15/18 1527    Magnesium 2.0 mg/dL       Specimen: Blood from Arm, Right Updated: 03/15/18 2136    TSH 1.230 mIU/mL    Lipid Panel [[id number]] (Abnormal) Collected: 03/15/18 1505   Lab Status: Final result Specimen: Blood Updated: 03/15/18 2110    Total Cholesterol 166 mg/dL     Triglycerides 327 (H) mg/dL     HDL Cholesterol 28 (L) mg/dL     LDL Cholesterol  69 mg/dL     LDL/HDL Ratio 2.59   Influenza Antigen, Rapid - Swab, Nasopharynx [[id number]] (Normal) Collected: 03/15/18 1502   Lab Status: Final result Specimen: Swab from Nasopharynx Updated: 03/15/18 1536    Influenza A Ag, EIA Negative    Influenza B Ag, EIA Negative   Narrative:       Recommend confirmation of negative results by viral culture or molecular assay.   Blood Gas, Arterial [[id number]] (Abnormal) Collected: 03/15/18 1422   Lab Status: Final result Specimen: Arterial Blood Updated: 03/15/18 1427    Site Right Brachial    Thien's Test N/A    pH, Arterial 7.440 pH units     pCO2, Arterial 33.8 (L) mm Hg     pO2, Arterial 73.6 (L) mm Hg     HCO3, Arterial 22.9 mmol/L     Base Excess, Arterial -0.6 (L) mmol/L     O2 Saturation, Arterial 94.7 %     Temperature 37.0 C     Barometric Pressure for Blood Gas 748 mmHg     Modality Room Air    Ventilator Mode NA    Collected by [id number]     Imaging Results (all)     Procedure Component Value Units Date/Time    US Carotid Bilateral [[id number]] Collected:  03/16/18 1204     Updated:  03/16/18 1208    Narrative:       History: Carotid occlusive disease       Impression:       Impression:  1. There is less than 50% stenosis of the right internal carotid artery.  2. There is less than 50% stenosis of the left internal carotid artery.  3. Antegrade flow is demonstrated in bilateral vertebral arteries.     Comments: Bilateral carotid vertebral  arterial duplex scan was  performed.     Grayscale imaging shows intimal thickening and calcified elements at the  carotid bifurcation. The right internal carotid artery peak systolic  velocity is 97.9 cm/sec. The end-diastolic velocity is 35.8 cm/sec. The  right ICA/CCA ratio is approximately 1.02 . These findings correlate  with less than 50% stenosis of the right internal carotid artery.     Grayscale imaging shows intimal thickening and calcified elements at the  carotid bifurcation. The left internal carotid artery peak systolic  velocity is 119 cm/sec. The end-diastolic velocity is 36.1 cm/sec. The  left ICA/CCA ratio is approximately 0.86 . These findings correlate with  less than 50% stenosis of the left internal carotid artery.     Antegrade flow is demonstrated in bilateral vertebral arteries.  There is greater than 50% stenosis of the left common carotid artery and  the left external carotid artery.  This report was finalized on 03/16/2018 12:05 by Dr. Chetan Echavarria MD.    CT Angiogram Chest With Contrast [[id number]] Collected:  03/15/18 1836     Updated:  03/15/18 1841    Narrative:       EXAMINATION: CT ANGIOGRAM CHEST W CONTRAST-      3/15/2018 5:36 PM CDT     HISTORY: syncope/ sob/ elevated dimer; R55-Syncope and collapse     In order to have a CT radiation dose as low as reasonably achievable  Automated Exposure Control was utilized for adjustment of the mA and/or  KV according to patient size.     DLP in mGycm= 1147.     CT angiography protocol.   CT imaging with bolus IV contrast injection.   Under concurrent supervision axial, sagittal, coronal, and  three-dimensional data sets were constructed.     Normal heart size.  Extensive coronary artery calcification.     No mediastinal mass.     No thoracic aortic aneurysm or dissection.     Symmetric pulmonary arteries.  No pulmonary embolism.     The lungs are fully expanded and clear. No infiltrate or effusion.     No abnormality is seen within the  upper abdomen.     Summary:  1. No pulmonary embolism.  2. No acute lung disease.     This report was finalized on 03/15/2018 18:38 by Dr. Celso Wynn MD.    CT Head Without Contrast [[id number]] Collected:  03/15/18 1533     Updated:  03/15/18 1553    Narrative:       CT HEAD WO CONTRAST- 3/15/2018 2:44 PM CDT     HISTORY: syncope       COMPARISON: 1/8/2018      DOSE LENGTH PRODUCT: 639 mGy cm. Automated exposure control was also  utilized to decrease patient radiation dose.     TECHNIQUE: Helical tomographic images of the brain were obtained without  the use of intravenous contrast.      FINDINGS:   Hemorrhage: None.     Mass effect: No midline shift or mass effect.     Ventricles: Normal and symmetric without hydrocephalus.     Basilar cisterns: Normal.     Sulci: Normal in configuration. No sulcal effacement.     Extra-axial fluid: No abnormal extra-axial fluid collections.     Grey and white matter differentiation: Normal.     Posterior fossa and brainstem: Normal.     Bones: No fractures or lesions.     Soft tissues: No acute process.     Sinuses and mastoid air cells: Postsurgical changes are seen in the  sinuses. Chronic sinusitis is noted.       Impression:       1. No acute intracranial process.  2. Evidence of chronic sinusitis.        This report was finalized on 03/15/2018 15:34 by Dr. Delio Langston MD.    XR Chest 1 View [[id number]] Collected:  03/15/18 1441     Updated:  03/15/18 1553    Narrative:       EXAMINATION: XR CHEST 1 VW- 3/15/2018 2:41 PM CDT     HISTORY: Syncope. COPD.     REPORT: Comparison is made with the study from 11/28/2017.     Lungs are hyperinflated, no infiltrate or consolidation is identified.  There is borderline cardiomegaly without evidence of CHF. Ectasia of the  thoracic aorta is present. No pneumothorax or pleural effusion is  identified. Degenerative spurring is seen throughout the thoracic spine  and at the sternocostal junction. Healed rib fractures are present  on  the left.       Impression:       COPD. No acute cardiopulmonary abnormality.  This report was finalized on 03/15/2018 14:42 by Dr. Cyril Reyna MD.        Transthoracic echocardiogram 3/16/18  · Left ventricular systolic function is normal. Estimated EF = 60%.  · Mild to moderate aortic valve regurgitation is present.  · Left ventricular diastolic dysfunction.  · No evidence of pulmonary hypertension is present.  · No evidence of a patent foramen ovale. Saline test results are negative.    Hospital Course  Patient is a 57 y.o. male presented to [facility] emergency room 3/15/16 after a syncopal episode at home.  Patient was in his usual state of health other than recent cough and congestion over the past few days.  No [facility] other [facility]  On the morning of the admission, he got out of his chair around 11 o'clock and was walking to the refrigerator when he developed a sensation of lightheadedness.  When he arrived at the refrigerator he felt as though he was going to pass out.  He reached to hold to the top of the refrigerator and the next thing he remembered the dog was licking him on his face and he was lying in the floor.  He is unsure of the amount of time he was on the floor but does not feel as though it was very long.  He reported episode of cough with nausea and vomiting that occurred at the same time he had this syncopal spell.  When he woke up he noticed emesis on the kitchen floor.  He does report a history of palpitations but denies chest pain.  He denies shortness of breath.  He denies decreased appetite.  He has not been started on any new medications.  He reported a similar episode in January and at that time neurology workup included a CAT scan and an MRI and he was evaluated by Dr. White.  No seizure activity associated with symptoms.  D-dimer 0.67, potassium 4.6, creatinine 1.07, opponent negative, PO2 73 on room air.  CT scan of the head  "no acute intracranial process.  Chronic sinusitis.  Chest x-ray revealed chronic obstructive pulmonary disease.  No acute cardiopulmonary abnormality.  CT angiogram showed no evidence of pulmonary emboli.    He was admitted to the telemetry floor with syncopal episode etiology undetermined.  He was placed on telemetry and remained normal sinus rhythm without any arrhythmias.  Heart rate remained 55-78.  He reported a history of carotid occlusive disease.  Noninvasive carotid studies showed less than 50% stenosis bilaterally.  Echocardiogram showed ejection fraction 60%, mild to moderate aortic valve regurgitation.  No pulmonary hypertension.  No evidence of patent foramen ovale.  Saline test negative.  He is followed by Dr. Ramey for coronary artery disease with multiple cardiac stents in the past.  He has upcoming appointment to see Dr. Ramey 5/14/18.  Dr. Ramey read the echocardiogram and advised patient to keep scheduled appointment unless patient develops symptoims that would necessitate earlier appointment.  Orthostatic blood pressures were monitored without any significant change in lying sitting standing position.  Blood pressure 112/68 lying, 116/73 sitting, 111/69 standing.  He was able to ambulate without syncopal or dizzy sensation.  Again, he has remained normal sinus rhythm without arrhythmias on telemetry.  Etiology for syncope undetermined although patient did report a coughing spell with nausea and vomiting prior to syncopal episode.  He did report one episode of lightheadedness at 4 AM on 3/17 after an extremely \"hard coughing spell\".  No arrhythmias noted on telemetry at time of coughing spell.  No adjustments in medications during hospital stay.  He will hold metformin 48 hours post contrast.  He was placed on Z-Humphrey and Medrol Dosepak as he was complaining of green sputum production and CT scan of head showed evidence of chronic sinusitis.  We will continue Mucinex after discharge.    On 3/17/18 he " "is suitable for discharge.  Echocardiogram negative.  Noninvasive carotids negative.  Blood pressure readings negative for orthostatic blood pressure changes.  No arrhythmias noted on telemetry.  He will follow-up with his primary care provider Vinayak Correia PA-C 3/23/18.  [facility].  He has electronic monitoring of glucoses and blood pressure readings through [facility].  He has been advised to continue with electronic monitoring with reading [facility] to [facility].     Physical Exam on Discharge:  /62 (BP Location: Right arm, Patient Position: Sitting)   Pulse 65   Temp 97.4 °F (36.3 °C) (Temporal Artery )   Resp 18   Ht 182.9 cm (72\")   Wt 109 kg (239 lb 3.2 oz)   SpO2 95%   BMI 32.44 kg/m²   Physical Exam   Constitutional: He is oriented to person, place, and time. He appears well-developed and well-nourished.   HENT:   Head: Normocephalic and atraumatic.   Eyes: EOM are normal. Pupils are equal, round, and reactive to light.   Neck: Normal range of motion. Neck supple. No thyromegaly present.   Cardiovascular: Normal rate, regular rhythm, normal heart sounds and intact distal pulses.  Exam reveals no gallop and no friction rub.    Normal sinus rhythm 55-78 on telemetry   Pulmonary/Chest: Effort normal and breath sounds normal. No respiratory distress. He has no wheezes. He has no rales. He exhibits no tenderness.   Abdominal: Soft. Bowel sounds are normal. He exhibits no distension. There is no tenderness.   Genitourinary:   Genitourinary Comments: Deferred.  Voiding without difficulty.   Musculoskeletal: Normal range of motion. He exhibits no edema or deformity.   Neurological: He is oriented to person, place, and time.   Skin: Skin is warm and dry.   Psychiatric: He has a normal mood and affect. His behavior is normal. Judgment normal.     Condition on Discharge:  Stable and improved    Discharge Disposition:  Home with family    Discharge Diet:   Diet Instructions     Diet: Consistent " Carbohydrate       Discharge Diet:  Consistent Carbohydrate    Diet: Consistent Carbohydrate       Discharge Diet:  Consistent Carbohydrate       diabetic diet    Activity at Discharge:   Activity Instructions     Activity as Tolerated          as tolerated    Discharge Care Plan / Instructions:   1.  Should he have worsening returning symptoms of syncopal lightheaded episode he should seek medical attention.  2.  Continue electronic monitoring of blood sugar and blood pressure through [facility] [employer] [facility].  3.  Hold metformin ×48 hours post contrast.  May resume metformin 3/18/18.  4.  He has been advised to avoid sudden position changes.  4.  He will be on Z-Humphrey and Medrol Dosepak at discharge.    Discharge Medications:   Carlos A Boyer Jr.   Home Medication Instructions BEN:[id number]    Printed on:03/17/18 1128   Medication Information                      albuterol (PROVENTIL HFA;VENTOLIN HFA) 108 (90 BASE) MCG/ACT inhaler  Inhale 2 puffs Every 6 (Six) Hours As Needed for wheezing.             aspirin 81 MG chewable tablet  Chew 81 mg Daily.             azithromycin (ZITHROMAX) 250 MG tablet  Take I tab 250 mg daily x 3 days start 3/18/18             carboxymethylcellulose (REFRESH PLUS) 0.5 % solution  Administer 1 drop to both eyes 4 (Four) Times a Day As Needed for Dry Eyes.             dextromethorphan-guaifenesin (ROBITUSSIN-DM)  MG/5ML syrup  Take 10 mL by mouth Every 6 (Six) Hours As Needed (cough).             fluticasone (FLONASE) 50 MCG/ACT nasal spray  2 sprays into each nostril Daily.             guaiFENesin (MUCINEX) 600 MG 12 hr tablet  Take 2 tablets by mouth Every 12 (Twelve) Hours for 5 days.             insulin aspart (novoLOG FLEXPEN) 100 UNIT/ML solution pen-injector sc pen  Inject 25 Units under the skin 3 (Three) Times a Day Before Meals.             Insulin Glargine (LANTUS SOLOSTAR) 100 UNIT/ML injection pen  Inject 65 Units under the skin 2 (Two) Times a Day.              isosorbide dinitrate (ISORDIL) 20 MG tablet  Take 1 tablet by mouth 2 (Two) Times a Day.             lamoTRIgine (LaMICtal) 200 MG tablet  Take 200 mg by mouth 2 (Two) Times a Day.             levETIRAcetam (KEPPRA) 500 MG tablet  Take 1,500 mg by mouth Daily.             levETIRAcetam (KEPPRA) 500 MG tablet  Take 2,000 mg by mouth Every Night.             lisinopril (PRINIVIL,ZESTRIL) 40 MG tablet  Take 20 mg by mouth Every Night.             metFORMIN (GLUCOPHAGE) 1000 MG tablet  Take 1 tablet by mouth 2 (Two) Times a Day With Meals. HOLD x 48 hours post contrast. May resume 3/18/18             MethylPREDNISolone (MEDROL, ALEJANDRO,) 4 MG tablet  Take as directed on package instructions.             metoprolol tartrate (LOPRESSOR) 100 MG tablet  Take 50 mg by mouth Every 12 (Twelve) Hours.             Multiple Vitamin (MULTI VITAMIN PO)  Take 1 tablet by mouth Daily.             naproxen sodium (ALEVE) 220 MG tablet  Take 220 mg by mouth Daily As Needed for Headache.             nitroglycerin (NITROSTAT) 0.4 MG SL tablet  Place 0.4 mg under the tongue Every 5 (Five) Minutes As Needed for chest pain. Take no more than 3 doses in 15 minutes.             pantoprazole (PROTONIX) 40 MG EC tablet  Take 40 mg by mouth 2 (Two) Times a Day.             psyllium (METAMUCIL) 58.6 % packet  Take 1 packet by mouth Daily As Needed (constipation).             rosuvastatin (CRESTOR) 40 MG tablet  Take 20 mg by mouth Every Night.             triamterene-hydrochlorothiazide (MAXZIDE) 75-50 MG per tablet  Take 0.5 tablets by mouth Daily.             urea (CARMOL) 40 % ointment  Apply 1 application topically Daily As Needed for Dry Skin. Apply to heels.               Follow-up Appointments:   Follow-up Information     Wade Ramey MD Follow up.    Specialty:  Cardiology  Why:  Keep scheduled appointment 5/14/18 at 9:45  Contact information:  [place]  [address] [place] [address]  [phone]             GINA Felder  Follow up.    Specialty:  Physician Assistant  Why:  Appointment on 3/23/18 at 2:00  Contact information:  [address] [place] [id number] [phone]                 Test Results Pending at Discharge: none    The above documentation resulted from a face-to-face encounter by me Meghan DICKINSON, [facility].    TEENA Rowe  03/17/18  11:01 AM    Time:  This discharge process required 35 minutes for completion.     Plan discussed with Dr. Jett and patient.    Patient seen and examined. Sitting up on side of bed. Coughing improved. Lung sounds coarse but improved from previous. Agree with DC plan as discussed with NP.    Time spent in face-to-face evaluation, chart review, planning and education 35 minutes.  Please note that portions of this note may have been completed with a voice recognition program. Efforts were made to edit the dictations, but occasionally words are mistranscribed.

## 2018-03-16 NOTE — PLAN OF CARE
Problem: Patient Care Overview  Goal: Plan of Care Review  Outcome: Ongoing (interventions implemented as appropriate)    Goal: Individualization and Mutuality  Outcome: Ongoing (interventions implemented as appropriate)    Goal: Discharge Needs Assessment  Outcome: Ongoing (interventions implemented as appropriate)    Goal: Interprofessional Rounds/Family Conf  Outcome: Ongoing (interventions implemented as appropriate)      Problem: Syncope (Adult)  Goal: Identify Related Risk Factors and Signs and Symptoms  Outcome: Ongoing (interventions implemented as appropriate)    Goal: Physical Safety/Health Maintenance  Outcome: Ongoing (interventions implemented as appropriate)    Goal: Optimal Emotional/Functional [place]  Outcome: Ongoing (interventions implemented as appropriate)

## 2018-03-17 VITALS
WEIGHT: 239.2 LBS | DIASTOLIC BLOOD PRESSURE: 62 MMHG | TEMPERATURE: 97.4 F | HEIGHT: 72 IN | OXYGEN SATURATION: 95 % | SYSTOLIC BLOOD PRESSURE: 121 MMHG | RESPIRATION RATE: 18 BRPM | BODY MASS INDEX: 32.4 KG/M2 | HEART RATE: 65 BPM

## 2018-03-17 LAB — GLUCOSE BLDC GLUCOMTR-MCNC: 281 MG/DL (ref 70–130)

## 2018-03-17 PROCEDURE — 82962 GLUCOSE BLOOD TEST: CPT

## 2018-03-17 PROCEDURE — 63710000001 INSULIN LISPRO (HUMAN) PER 5 UNITS: Performed by: NURSE PRACTITIONER

## 2018-03-17 PROCEDURE — G0378 HOSPITAL OBSERVATION PER HR: HCPCS

## 2018-03-17 RX ORDER — METHYLPREDNISOLONE 4 MG/1
TABLET ORAL
Qty: 1 EACH | Refills: 0 | Status: SHIPPED | OUTPATIENT
Start: 2018-03-17 | End: 2018-05-14

## 2018-03-17 RX ORDER — AZITHROMYCIN 250 MG/1
TABLET, FILM COATED ORAL
Qty: 3 TABLET | Refills: 0 | Status: SHIPPED | OUTPATIENT
Start: 2018-03-17 | End: 2018-04-30

## 2018-03-17 RX ORDER — GUAIFENESIN 600 MG/1
1200 TABLET, EXTENDED RELEASE ORAL EVERY 12 HOURS SCHEDULED
Qty: 20 TABLET | Refills: 0 | Status: SHIPPED | OUTPATIENT
Start: 2018-03-17 | End: 2018-03-22

## 2018-03-17 RX ADMIN — LAMOTRIGINE 200 MG: 100 TABLET ORAL at 09:35

## 2018-03-17 RX ADMIN — LEVETIRACETAM 1500 MG: 500 TABLET, FILM COATED ORAL at 09:34

## 2018-03-17 RX ADMIN — ASPIRIN 81 MG: 81 TABLET, CHEWABLE ORAL at 09:34

## 2018-03-17 RX ADMIN — FLUTICASONE PROPIONATE 2 SPRAY: 50 SPRAY, METERED NASAL at 09:35

## 2018-03-17 RX ADMIN — ISOSORBIDE DINITRATE 20 MG: 20 TABLET ORAL at 09:34

## 2018-03-17 RX ADMIN — AZITHROMYCIN 250 MG: 250 TABLET, FILM COATED ORAL at 09:34

## 2018-03-17 RX ADMIN — GUAIFENESIN 1200 MG: 600 TABLET, EXTENDED RELEASE ORAL at 09:34

## 2018-03-17 RX ADMIN — INSULIN LISPRO 4 UNITS: 100 INJECTION, SOLUTION INTRAVENOUS; SUBCUTANEOUS at 09:32

## 2018-03-17 RX ADMIN — DEXTROMETHORPHAN POLISTIREX 60 MG: 30 SUSPENSION ORAL at 09:34

## 2018-03-17 RX ADMIN — PANTOPRAZOLE SODIUM 40 MG: 40 TABLET, DELAYED RELEASE ORAL at 04:57

## 2018-03-17 NOTE — DISCHARGE INSTRUCTIONS
Gradually resume your normal activity.    Keep your records of blood glucoses and blood sugars and send to your doctors as directed.

## 2018-03-17 NOTE — PLAN OF CARE
Problem: Patient Care Overview  Goal: Plan of Care Review  Outcome: Ongoing (interventions implemented as appropriate)   03/17/18 0216   Coping/Psychosocial   Plan of Care Reviewed With patient   Plan of Care Review   Progress no change     Goal: Individualization and Mutuality  Outcome: Ongoing (interventions implemented as appropriate)    Goal: Discharge Needs Assessment  Outcome: Ongoing (interventions implemented as appropriate)    Goal: Interprofessional Rounds/Family Conf  Outcome: Ongoing (interventions implemented as appropriate)      Problem: Syncope (Adult)  Goal: Identify Related Risk Factors and Signs and Symptoms  Outcome: Ongoing (interventions implemented as appropriate)    Goal: Physical Safety/Health Maintenance  Outcome: Ongoing (interventions implemented as appropriate)    Goal: Optimal Emotional/Functional [place]  Outcome: Ongoing (interventions implemented as appropriate)

## 2018-03-18 LAB
BACTERIA SPEC RESP CULT: NORMAL
BACTERIA SPEC RESP CULT: NORMAL
GRAM STN SPEC: NORMAL

## 2018-03-27 RX ORDER — LEVETIRACETAM 500 MG/1
1500 TABLET ORAL DAILY
Qty: 270 TABLET | Refills: 1 | Status: SHIPPED | OUTPATIENT
Start: 2018-03-27 | End: 2018-09-21 | Stop reason: SDUPTHER

## 2018-03-27 RX ORDER — LEVETIRACETAM 500 MG/1
2000 TABLET ORAL NIGHTLY
Qty: 360 TABLET | Refills: 1 | Status: SHIPPED | OUTPATIENT
Start: 2018-03-27 | End: 2018-09-21 | Stop reason: SDUPTHER

## 2018-03-27 RX ORDER — LAMOTRIGINE 200 MG/1
200 TABLET ORAL 2 TIMES DAILY
Qty: 180 TABLET | Refills: 1 | Status: SHIPPED | OUTPATIENT
Start: 2018-03-27 | End: 2018-09-21 | Stop reason: SDUPTHER

## 2018-04-05 ENCOUNTER — OFFICE VISIT (OUTPATIENT)
Dept: UROLOGY | Facility: CLINIC | Age: 58
End: 2018-04-05

## 2018-04-05 VITALS
TEMPERATURE: 97.2 F | DIASTOLIC BLOOD PRESSURE: 72 MMHG | BODY MASS INDEX: 33.38 KG/M2 | HEIGHT: 72 IN | SYSTOLIC BLOOD PRESSURE: 134 MMHG | WEIGHT: 246.4 LBS

## 2018-04-05 DIAGNOSIS — N40.1 BENIGN NON-NODULAR PROSTATIC HYPERPLASIA WITH LOWER URINARY TRACT SYMPTOMS: Primary | ICD-10-CM

## 2018-04-05 LAB
BILIRUB BLD-MCNC: ABNORMAL MG/DL
CLARITY, POC: CLEAR
COLOR UR: YELLOW
GLUCOSE UR STRIP-MCNC: ABNORMAL MG/DL
KETONES UR QL: ABNORMAL
LEUKOCYTE EST, POC: NEGATIVE
NITRITE UR-MCNC: NEGATIVE MG/ML
PH UR: 5.5 [PH] (ref 5–8)
PROT UR STRIP-MCNC: NEGATIVE MG/DL
RBC # UR STRIP: NEGATIVE /UL
SP GR UR: 1.02 (ref 1–1.03)
UROBILINOGEN UR QL: NORMAL

## 2018-04-05 PROCEDURE — 81001 URINALYSIS AUTO W/SCOPE: CPT | Performed by: UROLOGY

## 2018-04-05 PROCEDURE — 99212 OFFICE O/P EST SF 10 MIN: CPT | Performed by: UROLOGY

## 2018-04-05 NOTE — PROGRESS NOTES
Mr. Boyer is 58 y.o. male    Chief Complaint   Patient presents with   • Benign Prostatic Hypertrophy       Benign Prostatic Hypertrophy   This is a chronic problem. The current episode started more than 1 year ago. The problem has been gradually worsening since onset. Irritative symptoms do not include frequency or urgency. Obstructive symptoms include a weak stream. Obstructive symptoms do not include an intermittent stream or straining. Pertinent negatives include no chills, dysuria or hematuria. AUA score is 8-19 (12). His sexual activity is non-contributory to the current illness. Nothing aggravates the symptoms. Treatments tried: Urolift. The treatment provided significant relief. He has been using treatment for 6 to 12 months.       The following portions of the patient's history were reviewed and updated as appropriate: allergies, current medications, past family history, past medical history, past social history, past surgical history and problem list.    Review of Systems   Constitutional: Negative for chills and fever.   Gastrointestinal: Negative for abdominal pain, anal bleeding and blood in stool.   Genitourinary: Negative for dysuria, flank pain, frequency, hematuria and urgency.         Current Outpatient Prescriptions:   •  albuterol (PROVENTIL HFA;VENTOLIN HFA) 108 (90 BASE) MCG/ACT inhaler, Inhale 2 puffs Every 6 (Six) Hours As Needed for wheezing., Disp: , Rfl:   •  aspirin 81 MG chewable tablet, Chew 81 mg Daily., Disp: , Rfl:   •  azithromycin (ZITHROMAX) 250 MG tablet, Take I tab 250 mg daily x 3 days start 3/18/18, Disp: 3 tablet, Rfl: 0  •  carboxymethylcellulose (REFRESH PLUS) 0.5 % solution, Administer 1 drop to both eyes 4 (Four) Times a Day As Needed for Dry Eyes., Disp: , Rfl:   •  dextromethorphan-guaifenesin (ROBITUSSIN-DM)  MG/5ML syrup, Take 10 mL by mouth Every 6 (Six) Hours As Needed (cough)., Disp: , Rfl:   •  fluticasone (FLONASE) 50 MCG/ACT nasal spray, 2 sprays into  each nostril Daily., Disp: , Rfl:   •  insulin aspart (novoLOG FLEXPEN) 100 UNIT/ML solution pen-injector sc pen, Inject 25 Units under the skin 3 (Three) Times a Day Before Meals., Disp: , Rfl:   •  Insulin Glargine (LANTUS SOLOSTAR) 100 UNIT/ML injection pen, Inject 65 Units under the skin 2 (Two) Times a Day., Disp: , Rfl:   •  isosorbide dinitrate (ISORDIL) 20 MG tablet, Take 1 tablet by mouth 2 (Two) Times a Day., Disp: 60 tablet, Rfl: 0  •  lamoTRIgine (LaMICtal) 200 MG tablet, Take 1 tablet by mouth 2 (Two) Times a Day., Disp: 180 tablet, Rfl: 1  •  levETIRAcetam (KEPPRA) 500 MG tablet, Take 3 tablets by mouth Daily., Disp: 270 tablet, Rfl: 1  •  levETIRAcetam (KEPPRA) 500 MG tablet, Take 4 tablets by mouth Every Night., Disp: 360 tablet, Rfl: 1  •  lisinopril (PRINIVIL,ZESTRIL) 40 MG tablet, Take 20 mg by mouth Every Night., Disp: , Rfl:   •  metFORMIN (GLUCOPHAGE) 1000 MG tablet, Take 1 tablet by mouth 2 (Two) Times a Day With Meals. HOLD x 48 hours post contrast. May resume 3/18/18, Disp: , Rfl:   •  MethylPREDNISolone (MEDROL, ALEJANDRO,) 4 MG tablet, Take as directed on package instructions., Disp: 1 each, Rfl: 0  •  metoprolol tartrate (LOPRESSOR) 100 MG tablet, Take 50 mg by mouth Every 12 (Twelve) Hours., Disp: , Rfl:   •  Multiple Vitamin (MULTI VITAMIN PO), Take 1 tablet by mouth Daily., Disp: , Rfl:   •  naproxen sodium (ALEVE) 220 MG tablet, Take 220 mg by mouth Daily As Needed for Headache., Disp: , Rfl:   •  nitroglycerin (NITROSTAT) 0.4 MG SL tablet, Place 0.4 mg under the tongue Every 5 (Five) Minutes As Needed for chest pain. Take no more than 3 doses in 15 minutes., Disp: , Rfl:   •  pantoprazole (PROTONIX) 40 MG EC tablet, Take 40 mg by mouth 2 (Two) Times a Day., Disp: , Rfl:   •  psyllium (METAMUCIL) 58.6 % packet, Take 1 packet by mouth Daily As Needed (constipation)., Disp: , Rfl:   •  rosuvastatin (CRESTOR) 40 MG tablet, Take 20 mg by mouth Every Night., Disp: , Rfl:   •   triamterene-hydrochlorothiazide (MAXZIDE) 75-50 MG per tablet, Take 0.5 tablets by mouth Daily., Disp: , Rfl:   •  urea (CARMOL) 40 % ointment, Apply 1 application topically Daily As Needed for Dry Skin. Apply to heels., Disp: , Rfl:     Past Medical History:   Diagnosis Date   • Arthritis    • Asthma    • Carotid disease, bilateral    • Chest pain    • Chronic ischemic heart disease    • Chronic sinusitis    • Kathia bullosa    • Coronary artery disease    • Deviated septum    • Diabetes mellitus    • Difficulty urinating    • Diverticulitis    • Enlarged prostate    • Fatty liver    • GERD (gastroesophageal reflux disease)    • Hyperlipidemia    • Hypertension    • Hypertrophy of nasal turbinates    • Keratoderma    • Kidney stone    • Migraine    • Murmur, heart    • Myocardial infarction    • Obesity    • PONV (postoperative nausea and vomiting)    • Psoriasis    • Seizures    • Sinus congestion    • Sleep apnea    • SOB (shortness of breath)    • Stroke        Past Surgical History:   Procedure Laterality Date   • CARDIAC CATHETERIZATION  01/2016    Dr. Broadbent; widely patent previously placed stents in the left anterior descending and obstructive disease involving the diagonal branch which was treated medically   • CARDIAC CATHETERIZATION N/A 7/14/2017    Procedure: Left Heart Cath;  Surgeon: Wade Ramey MD;  Location:  [facility];  Service:    • CORONARY ANGIOPLASTY     • CORONARY STENT PLACEMENT      Pt states 5 or 6 stents   • ENDOSCOPIC FUNCTIONAL SINUS SURGERY (FESS) Bilateral 12/13/2017    Procedure: PROCEDURE PERFORMED:  Bilateral functional endoscopic anterior ethmoidectomy with bilateral middle meatal antrostomy Septoplasty Right kathia bullosa resection Bilateral inferior turbinate reduction via Coblation;  Surgeon: Mayank Ibarra MD;  Location: [facility] OR;  Service:    • HERNIA REPAIR      x2 inguinal area   • KIDNEY STONE SURGERY     • KNEE SURGERY Right    • OTHER SURGICAL  "HISTORY      urolift   • THUMB AMPUTATION Left     partial   • TOE AMPUTATION Right     big       Social History     Social History   • Marital status:      Social History Main Topics   • Smoking status: Former Smoker     Quit date: 1993   • Smokeless tobacco: Former User     Types: Chew     Quit date: 2009      Comment: Pt quit 25 years ago   • Alcohol use No      Comment: quit 2009   • Drug use: No   • Sexual activity: Defer     Other Topics Concern   • Not on file       Family History   Problem Relation Age of Onset   • Heart disease Father    • Heart disease Maternal Grandmother        Objective    /72   Temp 97.2 °F (36.2 °C)   Ht 182.9 cm (72\")   Wt 112 kg (246 lb 6.4 oz)   BMI 33.42 kg/m²     Physical Exam    Admission on 03/15/2018, Discharged on 03/17/2018   No results displayed because visit has over 200 results.          Results for orders placed or performed in visit on 04/05/18   POC Urinalysis Dipstick, Automated   Result Value Ref Range    Color Yellow Yellow, Straw, Dark Yellow, Rosalia    Clarity, UA Clear Clear    Glucose, UA 1000 mg/dL (A) Negative, 1000 mg/dL (3+) mg/dL    Bilirubin Large (3+) (A) Negative    Ketones, UA Trace (A) Negative    Specific Gravity  1.020 1.005 - 1.030    Blood, UA Negative Negative    pH, Urine 5.5 5.0 - 8.0    Protein, POC Negative Negative mg/dL    Urobilinogen, UA Normal Normal    Leukocytes Negative Negative    Nitrite, UA Negative Negative     Assessment and Plan    Carlos A was seen today for benign prostatic hypertrophy.    Diagnoses and all orders for this visit:    Benign non-nodular prostatic hyperplasia with lower urinary tract symptoms  -     POC Urinalysis Dipstick, Automated    He continues to have good results from the Urolift.  He has stopped his flomax and finasteride and patient continues to have satisfactory daytime urination.  He still urinates every 3 hours at night, despite the use of his BiPAP.  Patient also has significant issues " with diabetes, and I do believe this is likely related to this.  I discussed with him that unfortunately it would not likely be able to cure his nocturia, and he expressed understanding.  He will follow back up with me as needed.  He will continue to get yearly PSA screenings for prostate cancer with his primary care physician

## 2018-04-30 ENCOUNTER — OFFICE VISIT (OUTPATIENT)
Dept: OTOLARYNGOLOGY | Facility: CLINIC | Age: 58
End: 2018-04-30

## 2018-04-30 VITALS
SYSTOLIC BLOOD PRESSURE: 120 MMHG | DIASTOLIC BLOOD PRESSURE: 80 MMHG | HEART RATE: 68 BPM | BODY MASS INDEX: 34 KG/M2 | TEMPERATURE: 97 F | HEIGHT: 72 IN | WEIGHT: 251 LBS

## 2018-04-30 DIAGNOSIS — K21.9 GASTROESOPHAGEAL REFLUX DISEASE WITHOUT ESOPHAGITIS: ICD-10-CM

## 2018-04-30 DIAGNOSIS — J30.89 CHRONIC NON-SEASONAL ALLERGIC RHINITIS, UNSPECIFIED TRIGGER: Primary | ICD-10-CM

## 2018-04-30 DIAGNOSIS — Z99.89 OSA ON CPAP: ICD-10-CM

## 2018-04-30 DIAGNOSIS — G47.33 OSA ON CPAP: ICD-10-CM

## 2018-04-30 PROCEDURE — 99214 OFFICE O/P EST MOD 30 MIN: CPT | Performed by: PHYSICIAN ASSISTANT

## 2018-04-30 NOTE — PROGRESS NOTES
YOB: 1960  Location: [facility]  Location Address: [address] [place] [address], [facility], [address] [place] [address]  Location Phone: [phone]    Chief Complaint   Patient presents with   • Follow-up     sinus       History of Present Illness  Carlos A Boyer Jr. is a 57 y.o. male.  Carlos A Boyer Jr. is here for follow up of ENT complaints status post bilateral functional endoscopic anterior ethmoidectomy with bilateral middle meatal antrostomy, septoplasty,  right kathia bullosa resection, bilateral inferior turbinate reduction via coblation on 17.  The patient has had problems with recent allergy flair-up.  The symptoms are not localized to a particular location. The patient has had mild to moderate symptoms. The symptoms have been present for the last several weeks. The symptoms are aggravated by  no identifiable factors. The symptoms are improved by Flonase.        Past Medical History:   Diagnosis Date   • Arthritis    • Asthma    • Carotid disease, bilateral    • Chest pain    • Chronic ischemic heart disease    • Chronic sinusitis    • Kathia bullosa    • Coronary artery disease    • Deviated septum    • Diabetes mellitus    • Difficulty urinating    • Diverticulitis    • Enlarged prostate    • Fatty liver    • GERD (gastroesophageal reflux disease)    • Hyperlipidemia    • Hypertension    • Hypertrophy of nasal turbinates    • Keratoderma    • Kidney stone    • Migraine    • Murmur, heart    • Myocardial infarction    • Obesity    • PONV (postoperative nausea and vomiting)    • Psoriasis    • Seizures    • Sinus congestion    • Sleep apnea    • SOB (shortness of breath)    • Stroke        Past Surgical History:   Procedure Laterality Date   • CARDIAC CATHETERIZATION  2016    Dr. Broadbent; widely patent previously placed stents in the left anterior descending and obstructive disease involving the diagonal branch which was treated medically   • CARDIAC CATHETERIZATION N/A 2017     Procedure: Left Heart Cath;  Surgeon: Wade Ramey MD;  Location:  [facility];  Service:    • CORONARY ANGIOPLASTY     • CORONARY STENT PLACEMENT      Pt states 5 or 6 stents   • ENDOSCOPIC FUNCTIONAL SINUS SURGERY (FESS) Bilateral 12/13/2017    Procedure: PROCEDURE PERFORMED:  Bilateral functional endoscopic anterior ethmoidectomy with bilateral middle meatal antrostomy Septoplasty Right kathia bullosa resection Bilateral inferior turbinate reduction via Coblation;  Surgeon: Mayank Ibarra MD;  Location: [facility] OR;  Service:    • HERNIA REPAIR      x2 inguinal area   • KIDNEY STONE SURGERY     • KNEE SURGERY Right    • OTHER SURGICAL HISTORY      urolift   • THUMB AMPUTATION Left     partial   • TOE AMPUTATION Right     big         Current Outpatient Prescriptions:   •  albuterol (PROVENTIL HFA;VENTOLIN HFA) 108 (90 BASE) MCG/ACT inhaler, Inhale 2 puffs Every 6 (Six) Hours As Needed for wheezing., Disp: , Rfl:   •  aspirin 81 MG chewable tablet, Chew 81 mg Daily., Disp: , Rfl:   •  carboxymethylcellulose (REFRESH PLUS) 0.5 % solution, Administer 1 drop to both eyes 4 (Four) Times a Day As Needed for Dry Eyes., Disp: , Rfl:   •  dextromethorphan-guaifenesin (ROBITUSSIN-DM)  MG/5ML syrup, Take 10 mL by mouth Every 6 (Six) Hours As Needed (cough)., Disp: , Rfl:   •  fluticasone (FLONASE) 50 MCG/ACT nasal spray, 2 sprays into each nostril Daily., Disp: , Rfl:   •  insulin aspart (novoLOG FLEXPEN) 100 UNIT/ML solution pen-injector sc pen, Inject 25 Units under the skin 3 (Three) Times a Day Before Meals., Disp: , Rfl:   •  Insulin Glargine (LANTUS SOLOSTAR) 100 UNIT/ML injection pen, Inject 65 Units under the skin 2 (Two) Times a Day., Disp: , Rfl:   •  isosorbide dinitrate (ISORDIL) 20 MG tablet, Take 1 tablet by mouth 2 (Two) Times a Day., Disp: 60 tablet, Rfl: 0  •  lamoTRIgine (LaMICtal) 200 MG tablet, Take 1 tablet by mouth 2 (Two) Times a Day., Disp: 180 tablet, Rfl: 1  •   levETIRAcetam (KEPPRA) 500 MG tablet, Take 3 tablets by mouth Daily., Disp: 270 tablet, Rfl: 1  •  levETIRAcetam (KEPPRA) 500 MG tablet, Take 4 tablets by mouth Every Night., Disp: 360 tablet, Rfl: 1  •  lisinopril (PRINIVIL,ZESTRIL) 40 MG tablet, Take 20 mg by mouth Every Night., Disp: , Rfl:   •  metFORMIN (GLUCOPHAGE) 1000 MG tablet, Take 1 tablet by mouth 2 (Two) Times a Day With Meals. HOLD x 48 hours post contrast. May resume 3/18/18, Disp: , Rfl:   •  metoprolol tartrate (LOPRESSOR) 100 MG tablet, Take 50 mg by mouth Every 12 (Twelve) Hours., Disp: , Rfl:   •  Multiple Vitamin (MULTI VITAMIN PO), Take 1 tablet by mouth Daily., Disp: , Rfl:   •  naproxen sodium (ALEVE) 220 MG tablet, Take 220 mg by mouth Daily As Needed for Headache., Disp: , Rfl:   •  nitroglycerin (NITROSTAT) 0.4 MG SL tablet, Place 0.4 mg under the tongue Every 5 (Five) Minutes As Needed for chest pain. Take no more than 3 doses in 15 minutes., Disp: , Rfl:   •  pantoprazole (PROTONIX) 40 MG EC tablet, Take 40 mg by mouth 2 (Two) Times a Day., Disp: , Rfl:   •  psyllium (METAMUCIL) 58.6 % packet, Take 1 packet by mouth Daily As Needed (constipation)., Disp: , Rfl:   •  rosuvastatin (CRESTOR) 40 MG tablet, Take 20 mg by mouth Every Night., Disp: , Rfl:   •  triamterene-hydrochlorothiazide (MAXZIDE) 75-50 MG per tablet, Take 0.5 tablets by mouth Daily., Disp: , Rfl:   •  urea (CARMOL) 40 % ointment, Apply 1 application topically Daily As Needed for Dry Skin. Apply to heels., Disp: , Rfl:   •  MethylPREDNISolone (MEDROL, ALEJANDRO,) 4 MG tablet, Take as directed on package instructions., Disp: 1 each, Rfl: 0    Flagyl [metronidazole]; Atorvastatin; and Ciprofloxacin    Family History   Problem Relation Age of Onset   • Heart disease Father    • Heart disease Maternal Grandmother        Social History     Social History   • Marital status:      Spouse name: N/A   • Number of children: N/A   • Years of education: N/A     Occupational History    • Not on file.     Social History Main Topics   • Smoking status: Former Smoker     Quit date: 1993   • Smokeless tobacco: Former User     Types: Chew     Quit date: 2009      Comment: Pt quit 25 years ago   • Alcohol use No      Comment: quit 2009   • Drug use: No   • Sexual activity: Defer     Other Topics Concern   • Not on file     Social History Narrative   • No narrative on file       Review of Systems   Constitutional: Negative.  Negative for activity change, appetite change, chills, diaphoresis, fatigue, fever and unexpected weight change.   HENT: Negative for congestion, ear discharge, ear pain, facial swelling, hearing loss, mouth sores, nosebleeds, postnasal drip, rhinorrhea, sinus pressure, sneezing, sore throat, tinnitus, trouble swallowing and voice change.    Eyes: Negative.  Negative for pain, discharge, redness, itching and visual disturbance.   Respiratory: Negative.  Negative for apnea, cough, choking, chest tightness, shortness of breath, wheezing and stridor.    Cardiovascular: Negative.    Gastrointestinal: Negative.  Negative for nausea and vomiting.   Endocrine: Negative.  Negative for cold intolerance and heat intolerance.   Genitourinary: Negative.    Musculoskeletal: Negative.  Negative for arthralgias, back pain, gait problem, neck pain and neck stiffness.   Skin: Negative.  Negative for rash.   Allergic/Immunologic: Positive for environmental allergies. Negative for food allergies.   Neurological: Negative for dizziness, tremors, seizures, syncope, facial asymmetry, speech difficulty, weakness, light-headedness, numbness and headaches.   Hematological: Negative for adenopathy. Does not bruise/bleed easily.   Psychiatric/Behavioral: Negative.  Negative for behavioral problems, sleep disturbance and suicidal ideas. The patient is not nervous/anxious and is not hyperactive.        Vitals:    04/30/18 1105   BP: 120/80   Pulse: 68   Temp: 97 °F (36.1 °C)       Objective     Physical Exam    Constitutional: He is oriented to person, place, and time. Vital signs are normal. He appears well-developed and well-nourished. No distress.   HENT:   Head: Normocephalic and atraumatic.   Right Ear: Hearing, tympanic membrane, external ear and ear canal normal. No lacerations. No drainage, swelling or tenderness. No foreign bodies. No mastoid tenderness. Tympanic membrane is not injected, not scarred, not perforated, not erythematous, not retracted and not bulging. Tympanic membrane mobility is normal. No middle ear effusion. No hemotympanum. No decreased hearing is noted.   Left Ear: Hearing, tympanic membrane, external ear and ear canal normal. No lacerations. No drainage, swelling or tenderness. No foreign bodies. No mastoid tenderness. Tympanic membrane is not injected, not scarred, not perforated, not erythematous, not retracted and not bulging. Tympanic membrane mobility is normal.  No middle ear effusion. No hemotympanum. No decreased hearing is noted.   Nose: Nose normal. No mucosal edema, rhinorrhea or septal deviation.   Mouth/Throat: Uvula is midline, oropharynx is clear and moist and mucous membranes are normal. Mucous membranes are not pale, not dry and not cyanotic. He does not have dentures. No oral lesions. No trismus in the jaw. Normal dentition. No uvula swelling. No oropharyngeal exudate, posterior oropharyngeal edema, posterior oropharyngeal erythema or tonsillar abscesses. Tonsils are 0 on the right. Tonsils are 0 on the left. No tonsillar exudate.       Eyes: Conjunctivae, EOM and lids are normal. Pupils are equal, round, and reactive to light. No scleral icterus. Right eye exhibits normal extraocular motion and no nystagmus. Left eye exhibits normal extraocular motion and no nystagmus. Right pupil is round and reactive. Left pupil is round and reactive. Pupils are equal.   Neck: Trachea normal, full passive range of motion without pain and phonation normal.   Cardiovascular: Normal rate.     Pulmonary/Chest: Effort normal. No stridor. No respiratory distress.   Musculoskeletal: He exhibits no edema.   Neurological: He is alert and oriented to person, place, and time. No cranial nerve deficit. Gait normal.   Skin: Skin is warm, dry and intact. No lesion and no rash noted. He is not diaphoretic. No erythema. No pallor.   Psychiatric: He has a normal mood and affect. His speech is normal and behavior is normal. Judgment and thought content normal. Cognition and memory are normal.   Vitals reviewed.        Assessment/Plan   Problems Addressed this Visit        Respiratory    HOA on CPAP       Digestive    Gastroesophageal reflux disease without esophagitis      Other Visit Diagnoses     Chronic non-seasonal allergic rhinitis, unspecified trigger    -  Primary        * Surgery not found *  No orders of the defined types were placed in this encounter.    Return if symptoms worsen or fail to improve.       Patient Instructions   Continue medications as directed, follow-up as needed if symptoms worsen.     ###### BMI  #####   MyPlate from [employer]  The general, healthful diet is based on the 2010 Dietary Guidelines for Americans. The amount of food you need to eat from each food group depends on your age, sex, and level of physical activity and can be individualized by a dietitian. Go to ChooseMyPlate.gov for more information.  What do I need to know about the MyPlate plan?  · Enjoy your food, but eat less.  · Avoid oversized portions.  ¨ ½ of your plate should include fruits and vegetables.  ¨ ¼ of your plate should be grains.  ¨ ¼ of your plate should be protein.  Grains   · Make at least half of your grains whole grains.  · For a 2,000 calorie daily food plan, eat 6 oz every day.  · 1 oz is about 1 slice bread, 1 cup cereal, or ½ cup cooked rice, cereal, or pasta.  Vegetables   · Make half your plate fruits and vegetables.  · For a 2,000 calorie daily food plan, eat 2½ cups every day.  · 1 cup is about 1 cup  raw or cooked vegetables or vegetable juice or 2 cups raw leafy greens.  Fruits   · Make half your plate fruits and vegetables.  · For a 2,000 calorie daily food plan, eat 2 cups every day.  · 1 cup is about 1 cup fruit or 100% fruit juice or ½ cup dried fruit.  Protein   · For a 2,000 calorie daily food plan, eat 5½ oz every day.  · 1 oz is about 1 oz meat, poultry, or fish, ¼ cup cooked beans, 1 egg, 1 Tbsp peanut butter, or ½ oz nuts or seeds.  Dairy   · Switch to fat-free or low-fat (1%) milk.  · For a 2,000 calorie daily food plan, eat 3 cups every day.  · 1 cup is about 1 cup milk or yogurt or soy milk (soy beverage), 1½ oz natural cheese, or 2 oz processed cheese.  Fats, Oils, and Empty Calories   · Only small amounts of oils are recommended.  · Empty calories are calories from solid fats or added sugars.  · Compare sodium in foods like soup, bread, and frozen meals. Choose the foods with lower numbers.  · Drink water instead of sugary drinks.  What foods can I eat?  Grains   Whole grains such as whole wheat, quinoa, millet, and bulgur. Bread, rolls, and pasta made from whole grains. Brown or wild rice. Hot or cold cereals made from whole grains and without added sugar.  Vegetables   All fresh vegetables, especially fresh red, dark green, or orange vegetables. Peas and beans. Low-sodium frozen or canned vegetables prepared without added salt. Low-sodium vegetable juices.  Fruits   All fresh, frozen, and dried fruits. Canned fruit packed in water or fruit juice without added sugar. Fruit juices without added sugar.  Meats and Other Protein Sources   Boiled, baked, or grilled lean meat trimmed of fat. Skinless poultry. Fresh seafood and shellfish. Canned seafood packed in water. Unsalted nuts and unsalted nut butters. Tofu. Dried beans and pea. Eggs.  Dairy   Low-fat or fat-free milk, yogurt, and cheeses.  Sweets and Desserts   Frozen desserts made from low-fat milk.  Fats and Oils   Olive, peanut, and canola  oils and margarine. Salad dressing and mayonnaise made from these oils.  Other   Soups and casseroles made from allowed ingredients and without added fat or salt.  The items listed above may not be a complete list of recommended foods or beverages. Contact your dietitian for more options.   What foods are not recommended?  Grains   Sweetened, low-fiber cereals. Packaged baked goods. Snack crackers and chips. Cheese crackers, butter crackers, and biscuits. Frozen waffles, sweet breads, doughnuts, pastries, packaged baking mixes, pancakes, cakes, and cookies.  Vegetables   Regular canned or frozen vegetables or vegetables prepared with salt. Canned tomatoes. Canned tomato sauce. Fried vegetables. Vegetables in cream sauce or cheese sauce.  Fruits   Fruits packed in syrup or made with added sugar.  Meats and Other Protein Sources   Marbled or fatty meats such as ribs. Poultry with skin. Fried meats, poultry, eggs, or fish. Sausages, hot dogs, and deli meats such as pastrami, bologna, or salami.  Dairy   Whole milk, cream, cheeses made from whole milk, sour cream. Ice cream or yogurt made from whole milk or with added sugar.  Beverages   For adults, no more than one alcoholic drink per day. Regular soft drinks or other sugary beverages. Juice drinks.  Sweets and Desserts   Sugary or fatty desserts, candy, and other sweets.  Fats and Oils   Solid shortening or partially hydrogenated oils. Solid margarine. Margarine that contains trans fats. Butter.  The items listed above may not be a complete list of foods and beverages to avoid. Contact your dietitian for more information.   This information is not intended to replace advice given to you by your health care provider. Make sure you discuss any questions you have with your health care provider.  Document Released: 01/06/2009 Document Revised: 05/25/2017 Document Reviewed: 11/26/2014  Elsevier Interactive Patient Education © 2017 [employer]     Calorie Counting for  Weight Loss  Calories are units of energy. Your body needs a certain amount of calories from food to keep you going throughout the day. When you eat more calories than your body needs, your body stores the extra calories as fat. When you eat fewer calories than your body needs, your body burns fat to get the energy it needs.  Calorie counting means keeping track of how many calories you eat and drink each day. Calorie counting can be helpful if you need to lose weight. If you make sure to eat fewer calories than your body needs, you should lose weight. Ask your health care provider what a healthy weight is for you.  For calorie counting to work, you will need to eat the right number of calories in a day in order to lose a healthy amount of weight per week. A dietitian can help you determine how many calories you need in a day and will give you suggestions on how to reach your calorie goal.  · A healthy amount of weight to lose per week is usually 1-2 lb (0.5-0.9 kg). This usually means that your daily calorie intake should be reduced by 500-750 calories.  · Eating 1,200 - 1,500 calories per day can help most women lose weight.  · Eating 1,500 - 1,800 calories per day can help most men lose weight.  What is my plan?  My goal is to have __________ calories per day.  If I have this many calories per day, I should lose around __________ pounds per week.  What do I need to know about calorie counting?  In order to meet your daily calorie goal, you will need to:  · Find out how many calories are in each food you would like to eat. Try to do this before you eat.  · Decide how much of the food you plan to eat.  · Write down what you ate and how many calories it had. Doing this is called keeping a food log.  To successfully lose weight, it is important to balance calorie counting with a healthy lifestyle that includes regular activity. Aim for 150 minutes of moderate exercise (such as walking) or 75 minutes of vigorous  exercise (such as running) each week.  Where do I find calorie information?     The number of calories in a food can be found on a Nutrition Facts label. If a food does not have a Nutrition Facts label, try to look up the calories online or ask your dietitian for help.  Remember that calories are listed per serving. If you choose to have more than one serving of a food, you will have to multiply the calories per serving by the amount of servings you plan to eat. For example, the label on a package of bread might say that a serving size is 1 slice and that there are 90 calories in a serving. If you eat 1 slice, you will have eaten 90 calories. If you eat 2 slices, you will have eaten 180 calories.  How do I keep a food log?  Immediately after each meal, record the following information in your food log:  · What you ate. Don't forget to include toppings, sauces, and other extras on the food.  · How much you ate. This can be measured in cups, ounces, or number of items.  · How many calories each food and drink had.  · The total number of calories in the meal.  Keep your food log near you, such as in a small notebook in your pocket, or use a mobile gracieal or website. Some programs will calculate calories for you and show you how many calories you have left for the day to meet your goal.  What are some calorie counting tips?  · Use your calories on foods and drinks that will fill you up and not leave you hungry:  ¨ Some examples of foods that fill you up are nuts and nut butters, vegetables, lean proteins, and high-fiber foods like whole grains. High-fiber foods are foods with more than 5 g fiber per serving.  ¨ Drinks such as sodas, specialty coffee drinks, alcohol, and juices have a lot of calories, yet do not fill you up.  · Eat nutritious foods and avoid empty calories. Empty calories are calories you get from foods or beverages that do not have many vitamins or protein, such as candy, sweets, and soda. It is better to  "have a nutritious high-calorie food (such as an avocado) than a food with few nutrients (such as a bag of chips).  · Know how many calories are in the foods you eat most often. This will help you calculate calorie counts faster.  · Pay attention to calories in drinks. Low-calorie drinks include water and unsweetened drinks.  · Pay attention to nutrition labels for \"low fat\" or \"fat free\" foods. These foods sometimes have the same amount of calories or more calories than the full fat versions. They also often have added sugar, starch, or salt, to make up for flavor that was removed with the fat.  · Find a way of tracking calories that works for you. Get creative. Try different apps or programs if writing down calories does not work for you.  What are some portion control tips?  · Know how many calories are in a serving. This will help you know how many servings of a certain food you can have.  · Use a measuring cup to measure serving sizes. You could also try weighing out portions on a kitchen scale. With time, you will be able to estimate serving sizes for some foods.  · Take some time to put servings of different foods on your favorite plates, bowls, and cups so you know what a serving looks like.  · Try not to eat straight from a bag or box. Doing this can lead to overeating. Put the amount you would like to eat in a cup or on a plate to make sure you are eating the right portion.  · Use smaller plates, glasses, and bowls to prevent overeating.  · Try not to multitask (for example, watch TV or use your computer) while eating. If it is time to eat, sit down at a table and enjoy your food. This will help you to know when you are full. It will also help you to be aware of what you are eating and how much you are eating.  What are tips for following this plan?  Reading food labels   · Check the calorie count compared to the serving size. The serving size may be smaller than what you are used to eating.  · Check the " source of the calories. Make sure the food you are eating is high in vitamins and protein and low in saturated and trans fats.  Shopping   · Read nutrition labels while you shop. This will help you make healthy decisions before you decide to purchase your food.  · Make a grocery list and stick to it.  Cooking   · Try to cook your favorite foods in a healthier way. For example, try baking instead of frying.  · Use low-fat dairy products.  Meal planning   · Use more fruits and vegetables. Half of your plate should be fruits and vegetables.  · Include lean proteins like poultry and fish.  How do I count calories when eating out?  · Ask for smaller portion sizes.  · Consider sharing an entree and sides instead of getting your own entree.  · If you get your own entree, eat only half. Ask for a box at the beginning of your meal and put the rest of your entree in it so you are not tempted to eat it.  · If calories are listed on the menu, choose the lower calorie options.  · Choose dishes that include vegetables, fruits, whole grains, low-fat dairy products, and lean protein.  · Choose items that are boiled, broiled, grilled, or steamed. Stay away from items that are buttered, battered, fried, or served with cream sauce. Items labeled “crispy” are usually fried, unless stated otherwise.  · Choose water, low-fat milk, unsweetened iced tea, or other drinks without added sugar. If you want an alcoholic beverage, choose a lower calorie option such as a glass of wine or light beer.  · Ask for dressings, sauces, and syrups on the side. These are usually high in calories, so you should limit the amount you eat.  · If you want a salad, choose a garden salad and ask for grilled meats. Avoid extra toppings like march, cheese, or fried items. Ask for the dressing on the side, or ask for olive oil and vinegar or lemon to use as dressing.  · Estimate how many servings of a food you are given. For example, a serving of cooked rice is ½  cup or about the size of half a baseball. Knowing serving sizes will help you be aware of how much food you are eating at restaurants. The list below tells you how big or small some common portion sizes are based on everyday objects:  ¨ 1 oz--4 stacked dice.  ¨ 3 oz--1 deck of cards.  ¨ 1 tsp--1 die.  ¨ 1 Tbsp--½ a ping-pong ball.  ¨ 2 Tbsp--1 ping-pong ball.  ¨ ½ cup--½ baseball.  ¨ 1 cup--1 baseball.  Summary  · Calorie counting means keeping track of how many calories you eat and drink each day. If you eat fewer calories than your body needs, you should lose weight.  · A healthy amount of weight to lose per week is usually 1-2 lb (0.5-0.9 kg). This usually means reducing your daily calorie intake by 500-750 calories.  · The number of calories in a food can be found on a Nutrition Facts label. If a food does not have a Nutrition Facts label, try to look up the calories online or ask your dietitian for help.  · Use your calories on foods and drinks that will fill you up, and not on foods and drinks that will leave you hungry.  · Use smaller plates, glasses, and bowls to prevent overeating.  This information is not intended to replace advice given to you by your health care provider. Make sure you discuss any questions you have with your health care provider.  Document Released: 12/18/2006 Document Revised: 11/17/2017 Document Reviewed: 11/17/2017  [employer] Interactive Patient Education © 2017 [employer]     Exercising to Lose Weight  Exercising can help you to lose weight. In order to lose weight through exercise, you need to do vigorous-intensity exercise. You can tell that you are exercising with vigorous intensity if you are breathing very hard and fast and cannot hold a conversation while exercising.  Moderate-intensity exercise helps to maintain your current weight. You can tell that you are exercising at a moderate level if you have a higher heart rate and faster breathing, but you are still able to hold a  conversation.  How often should I exercise?  Choose an activity that you enjoy and set realistic goals. Your health care provider can help you to make an activity plan that works for you. Exercise regularly as directed by your health care provider. This may include:  · Doing resistance training twice each week, such as:  ¨ Push-ups.  ¨ Sit-ups.  ¨ Lifting weights.  ¨ Using resistance bands.  · Doing a given intensity of exercise for a given amount of time. Choose from these options:  ¨ 150 minutes of moderate-intensity exercise every week.  ¨ 75 minutes of vigorous-intensity exercise every week.  ¨ A mix of moderate-intensity and vigorous-intensity exercise every week.  Children, pregnant women, people who are out of shape, people who are overweight, and older adults may need to consult a health care provider for individual recommendations. If you have any sort of medical condition, be sure to consult your health care provider before starting a new exercise program.  What are some activities that can help me to lose weight?  · Walking at a rate of at least 4.5 miles an hour.  · Jogging or running at a rate of 5 miles per hour.  · Biking at a rate of at least 10 miles per hour.  · Lap swimming.  · Roller-skating or in-line skating.  · Cross-country skiing.  · Vigorous competitive sports, such as football, basketball, and soccer.  · Jumping rope.  · Aerobic dancing.  How can I be more active in my day-to-day activities?  · Use the stairs instead of the elevator.  · Take a walk during your lunch break.  · If you drive, park your car farther away from work or school.  · If you take public transportation, get off one stop early and walk the rest of the way.  · Make all of your phone calls while standing up and walking around.  · Get up, stretch, and walk around every 30 minutes throughout the day.  What guidelines should I follow while exercising?  · Do not exercise so much that you hurt yourself, feel dizzy, or get very  short of breath.  · Consult your health care provider prior to starting a new exercise program.  · Wear comfortable clothes and shoes with good support.  · Drink plenty of water while you exercise to prevent dehydration or heat stroke. Body water is lost during exercise and must be replaced.  · Work out until you breathe faster and your heart beats faster.  This information is not intended to replace advice given to you by your health care provider. Make sure you discuss any questions you have with your health care provider.  Document Released: 01/20/2012 Document Revised: 05/25/2017 Document Reviewed: 05/21/2015  [employer] Interactive Patient Education © 2017 [employer]

## 2018-04-30 NOTE — PATIENT INSTRUCTIONS
Continue medications as directed, follow-up as needed if symptoms worsen.     ###### BMI  #####   MyPlate from [employer]  The general, healthful diet is based on the 2010 Dietary Guidelines for Americans. The amount of food you need to eat from each food group depends on your age, sex, and level of physical activity and can be individualized by a dietitian. Go to ChooseMyPlate.gov for more information.  What do I need to know about the MyPlate plan?  · Enjoy your food, but eat less.  · Avoid oversized portions.  ¨ ½ of your plate should include fruits and vegetables.  ¨ ¼ of your plate should be grains.  ¨ ¼ of your plate should be protein.  Grains   · Make at least half of your grains whole grains.  · For a 2,000 calorie daily food plan, eat 6 oz every day.  · 1 oz is about 1 slice bread, 1 cup cereal, or ½ cup cooked rice, cereal, or pasta.  Vegetables   · Make half your plate fruits and vegetables.  · For a 2,000 calorie daily food plan, eat 2½ cups every day.  · 1 cup is about 1 cup raw or cooked vegetables or vegetable juice or 2 cups raw leafy greens.  Fruits   · Make half your plate fruits and vegetables.  · For a 2,000 calorie daily food plan, eat 2 cups every day.  · 1 cup is about 1 cup fruit or 100% fruit juice or ½ cup dried fruit.  Protein   · For a 2,000 calorie daily food plan, eat 5½ oz every day.  · 1 oz is about 1 oz meat, poultry, or fish, ¼ cup cooked beans, 1 egg, 1 Tbsp peanut butter, or ½ oz nuts or seeds.  Dairy   · Switch to fat-free or low-fat (1%) milk.  · For a 2,000 calorie daily food plan, eat 3 cups every day.  · 1 cup is about 1 cup milk or yogurt or soy milk (soy beverage), 1½ oz natural cheese, or 2 oz processed cheese.  Fats, Oils, and Empty Calories   · Only small amounts of oils are recommended.  · Empty calories are calories from solid fats or added sugars.  · Compare sodium in foods like soup, bread, and frozen meals. Choose the foods with lower numbers.  · Drink water instead of  sugary drinks.  What foods can I eat?  Grains   Whole grains such as whole wheat, quinoa, millet, and bulgur. Bread, rolls, and pasta made from whole grains. Brown or wild rice. Hot or cold cereals made from whole grains and without added sugar.  Vegetables   All fresh vegetables, especially fresh red, dark green, or orange vegetables. Peas and beans. Low-sodium frozen or canned vegetables prepared without added salt. Low-sodium vegetable juices.  Fruits   All fresh, frozen, and dried fruits. Canned fruit packed in water or fruit juice without added sugar. Fruit juices without added sugar.  Meats and Other Protein Sources   Boiled, baked, or grilled lean meat trimmed of fat. Skinless poultry. Fresh seafood and shellfish. Canned seafood packed in water. Unsalted nuts and unsalted nut butters. Tofu. Dried beans and pea. Eggs.  Dairy   Low-fat or fat-free milk, yogurt, and cheeses.  Sweets and Desserts   Frozen desserts made from low-fat milk.  Fats and Oils   Olive, peanut, and canola oils and margarine. Salad dressing and mayonnaise made from these oils.  Other   Soups and casseroles made from allowed ingredients and without added fat or salt.  The items listed above may not be a complete list of recommended foods or beverages. Contact your dietitian for more options.   What foods are not recommended?  Grains   Sweetened, low-fiber cereals. Packaged baked goods. Snack crackers and chips. Cheese crackers, butter crackers, and biscuits. Frozen waffles, sweet breads, doughnuts, pastries, packaged baking mixes, pancakes, cakes, and cookies.  Vegetables   Regular canned or frozen vegetables or vegetables prepared with salt. Canned tomatoes. Canned tomato sauce. Fried vegetables. Vegetables in cream sauce or cheese sauce.  Fruits   Fruits packed in syrup or made with added sugar.  Meats and Other Protein Sources   Marbled or fatty meats such as ribs. Poultry with skin. Fried meats, poultry, eggs, or fish. Sausages, hot  dogs, and deli meats such as pastrami, bologna, or salami.  Dairy   Whole milk, cream, cheeses made from whole milk, sour cream. Ice cream or yogurt made from whole milk or with added sugar.  Beverages   For adults, no more than one alcoholic drink per day. Regular soft drinks or other sugary beverages. Juice drinks.  Sweets and Desserts   Sugary or fatty desserts, candy, and other sweets.  Fats and Oils   Solid shortening or partially hydrogenated oils. Solid margarine. Margarine that contains trans fats. Butter.  The items listed above may not be a complete list of foods and beverages to avoid. Contact your dietitian for more information.   This information is not intended to replace advice given to you by your health care provider. Make sure you discuss any questions you have with your health care provider.  Document Released: 01/06/2009 Document Revised: 05/25/2017 Document Reviewed: 11/26/2014  [employer] Interactive Patient Education © 2017 [employer]     Calorie Counting for Weight Loss  Calories are units of energy. Your body needs a certain amount of calories from food to keep you going throughout the day. When you eat more calories than your body needs, your body stores the extra calories as fat. When you eat fewer calories than your body needs, your body burns fat to get the energy it needs.  Calorie counting means keeping track of how many calories you eat and drink each day. Calorie counting can be helpful if you need to lose weight. If you make sure to eat fewer calories than your body needs, you should lose weight. Ask your health care provider what a healthy weight is for you.  For calorie counting to work, you will need to eat the right number of calories in a day in order to lose a healthy amount of weight per week. A dietitian can help you determine how many calories you need in a day and will give you suggestions on how to reach your calorie goal.  · A healthy amount of weight to lose per week  is usually 1-2 lb (0.5-0.9 kg). This usually means that your daily calorie intake should be reduced by 500-750 calories.  · Eating 1,200 - 1,500 calories per day can help most women lose weight.  · Eating 1,500 - 1,800 calories per day can help most men lose weight.  What is my plan?  My goal is to have __________ calories per day.  If I have this many calories per day, I should lose around __________ pounds per week.  What do I need to know about calorie counting?  In order to meet your daily calorie goal, you will need to:  · Find out how many calories are in each food you would like to eat. Try to do this before you eat.  · Decide how much of the food you plan to eat.  · Write down what you ate and how many calories it had. Doing this is called keeping a food log.  To successfully lose weight, it is important to balance calorie counting with a healthy lifestyle that includes regular activity. Aim for 150 minutes of moderate exercise (such as walking) or 75 minutes of vigorous exercise (such as running) each week.  Where do I find calorie information?     The number of calories in a food can be found on a Nutrition Facts label. If a food does not have a Nutrition Facts label, try to look up the calories online or ask your dietitian for help.  Remember that calories are listed per serving. If you choose to have more than one serving of a food, you will have to multiply the calories per serving by the amount of servings you plan to eat. For example, the label on a package of bread might say that a serving size is 1 slice and that there are 90 calories in a serving. If you eat 1 slice, you will have eaten 90 calories. If you eat 2 slices, you will have eaten 180 calories.  How do I keep a food log?  Immediately after each meal, record the following information in your food log:  · What you ate. Don't forget to include toppings, sauces, and other extras on the food.  · How much you ate. This can be measured in cups,  "ounces, or number of items.  · How many calories each food and drink had.  · The total number of calories in the meal.  Keep your food log near you, such as in a small notebook in your pocket, or use a mobile graciela or website. Some programs will calculate calories for you and show you how many calories you have left for the day to meet your goal.  What are some calorie counting tips?  · Use your calories on foods and drinks that will fill you up and not leave you hungry:  ¨ Some examples of foods that fill you up are nuts and nut butters, vegetables, lean proteins, and high-fiber foods like whole grains. High-fiber foods are foods with more than 5 g fiber per serving.  ¨ Drinks such as sodas, specialty coffee drinks, alcohol, and juices have a lot of calories, yet do not fill you up.  · Eat nutritious foods and avoid empty calories. Empty calories are calories you get from foods or beverages that do not have many vitamins or protein, such as candy, sweets, and soda. It is better to have a nutritious high-calorie food (such as an avocado) than a food with few nutrients (such as a bag of chips).  · Know how many calories are in the foods you eat most often. This will help you calculate calorie counts faster.  · Pay attention to calories in drinks. Low-calorie drinks include water and unsweetened drinks.  · Pay attention to nutrition labels for \"low fat\" or \"fat free\" foods. These foods sometimes have the same amount of calories or more calories than the full fat versions. They also often have added sugar, starch, or salt, to make up for flavor that was removed with the fat.  · Find a way of tracking calories that works for you. Get creative. Try different apps or programs if writing down calories does not work for you.  What are some portion control tips?  · Know how many calories are in a serving. This will help you know how many servings of a certain food you can have.  · Use a measuring cup to measure serving sizes. " You could also try weighing out portions on a kitchen scale. With time, you will be able to estimate serving sizes for some foods.  · Take some time to put servings of different foods on your favorite plates, bowls, and cups so you know what a serving looks like.  · Try not to eat straight from a bag or box. Doing this can lead to overeating. Put the amount you would like to eat in a cup or on a plate to make sure you are eating the right portion.  · Use smaller plates, glasses, and bowls to prevent overeating.  · Try not to multitask (for example, watch TV or use your computer) while eating. If it is time to eat, sit down at a table and enjoy your food. This will help you to know when you are full. It will also help you to be aware of what you are eating and how much you are eating.  What are tips for following this plan?  Reading food labels   · Check the calorie count compared to the serving size. The serving size may be smaller than what you are used to eating.  · Check the source of the calories. Make sure the food you are eating is high in vitamins and protein and low in saturated and trans fats.  Shopping   · Read nutrition labels while you shop. This will help you make healthy decisions before you decide to purchase your food.  · Make a grocery list and stick to it.  Cooking   · Try to cook your favorite foods in a healthier way. For example, try baking instead of frying.  · Use low-fat dairy products.  Meal planning   · Use more fruits and vegetables. Half of your plate should be fruits and vegetables.  · Include lean proteins like poultry and fish.  How do I count calories when eating out?  · Ask for smaller portion sizes.  · Consider sharing an entree and sides instead of getting your own entree.  · If you get your own entree, eat only half. Ask for a box at the beginning of your meal and put the rest of your entree in it so you are not tempted to eat it.  · If calories are listed on the menu, choose the  lower calorie options.  · Choose dishes that include vegetables, fruits, whole grains, low-fat dairy products, and lean protein.  · Choose items that are boiled, broiled, grilled, or steamed. Stay away from items that are buttered, battered, fried, or served with cream sauce. Items labeled “crispy” are usually fried, unless stated otherwise.  · Choose water, low-fat milk, unsweetened iced tea, or other drinks without added sugar. If you want an alcoholic beverage, choose a lower calorie option such as a glass of wine or light beer.  · Ask for dressings, sauces, and syrups on the side. These are usually high in calories, so you should limit the amount you eat.  · If you want a salad, choose a garden salad and ask for grilled meats. Avoid extra toppings like march, cheese, or fried items. Ask for the dressing on the side, or ask for olive oil and vinegar or lemon to use as dressing.  · Estimate how many servings of a food you are given. For example, a serving of cooked rice is ½ cup or about the size of half a baseball. Knowing serving sizes will help you be aware of how much food you are eating at restaurants. The list below tells you how big or small some common portion sizes are based on everyday objects:  ¨ 1 oz--4 stacked dice.  ¨ 3 oz--1 deck of cards.  ¨ 1 tsp--1 die.  ¨ 1 Tbsp--½ a ping-pong ball.  ¨ 2 Tbsp--1 ping-pong ball.  ¨ ½ cup--½ baseball.  ¨ 1 cup--1 baseball.  Summary  · Calorie counting means keeping track of how many calories you eat and drink each day. If you eat fewer calories than your body needs, you should lose weight.  · A healthy amount of weight to lose per week is usually 1-2 lb (0.5-0.9 kg). This usually means reducing your daily calorie intake by 500-750 calories.  · The number of calories in a food can be found on a Nutrition Facts label. If a food does not have a Nutrition Facts label, try to look up the calories online or ask your dietitian for help.  · Use your calories on foods and  drinks that will fill you up, and not on foods and drinks that will leave you hungry.  · Use smaller plates, glasses, and bowls to prevent overeating.  This information is not intended to replace advice given to you by your health care provider. Make sure you discuss any questions you have with your health care provider.  Document Released: 12/18/2006 Document Revised: 11/17/2017 Document Reviewed: 11/17/2017  [employer] Interactive Patient Education © 2017 [employer]     Exercising to Lose Weight  Exercising can help you to lose weight. In order to lose weight through exercise, you need to do vigorous-intensity exercise. You can tell that you are exercising with vigorous intensity if you are breathing very hard and fast and cannot hold a conversation while exercising.  Moderate-intensity exercise helps to maintain your current weight. You can tell that you are exercising at a moderate level if you have a higher heart rate and faster breathing, but you are still able to hold a conversation.  How often should I exercise?  Choose an activity that you enjoy and set realistic goals. Your health care provider can help you to make an activity plan that works for you. Exercise regularly as directed by your health care provider. This may include:  · Doing resistance training twice each week, such as:  ¨ Push-ups.  ¨ Sit-ups.  ¨ Lifting weights.  ¨ Using resistance bands.  · Doing a given intensity of exercise for a given amount of time. Choose from these options:  ¨ 150 minutes of moderate-intensity exercise every week.  ¨ 75 minutes of vigorous-intensity exercise every week.  ¨ A mix of moderate-intensity and vigorous-intensity exercise every week.  Children, pregnant women, people who are out of shape, people who are overweight, and older adults may need to consult a health care provider for individual recommendations. If you have any sort of medical condition, be sure to consult your health care provider before starting  a new exercise program.  What are some activities that can help me to lose weight?  · Walking at a rate of at least 4.5 miles an hour.  · Jogging or running at a rate of 5 miles per hour.  · Biking at a rate of at least 10 miles per hour.  · Lap swimming.  · Roller-skating or in-line skating.  · Cross-country skiing.  · Vigorous competitive sports, such as football, basketball, and soccer.  · Jumping rope.  · Aerobic dancing.  How can I be more active in my day-to-day activities?  · Use the stairs instead of the elevator.  · Take a walk during your lunch break.  · If you drive, park your car farther away from work or school.  · If you take public transportation, get off one stop early and walk the rest of the way.  · Make all of your phone calls while standing up and walking around.  · Get up, stretch, and walk around every 30 minutes throughout the day.  What guidelines should I follow while exercising?  · Do not exercise so much that you hurt yourself, feel dizzy, or get very short of breath.  · Consult your health care provider prior to starting a new exercise program.  · Wear comfortable clothes and shoes with good support.  · Drink plenty of water while you exercise to prevent dehydration or heat stroke. Body water is lost during exercise and must be replaced.  · Work out until you breathe faster and your heart beats faster.  This information is not intended to replace advice given to you by your health care provider. Make sure you discuss any questions you have with your health care provider.  Document Released: 01/20/2012 Document Revised: 05/25/2017 Document Reviewed: 05/21/2015  [employer] Interactive Patient Education © 2017 [employer]

## 2018-05-14 ENCOUNTER — OFFICE VISIT (OUTPATIENT)
Dept: CARDIOLOGY | Facility: CLINIC | Age: 58
End: 2018-05-14

## 2018-05-14 VITALS
BODY MASS INDEX: 33.72 KG/M2 | WEIGHT: 249 LBS | HEIGHT: 72 IN | HEART RATE: 79 BPM | DIASTOLIC BLOOD PRESSURE: 74 MMHG | SYSTOLIC BLOOD PRESSURE: 102 MMHG

## 2018-05-14 DIAGNOSIS — Z99.89 OSA ON CPAP: ICD-10-CM

## 2018-05-14 DIAGNOSIS — I10 ESSENTIAL HYPERTENSION: Primary | ICD-10-CM

## 2018-05-14 DIAGNOSIS — I69.30 CHRONIC LEFT ARTERIAL ISCHEMIC STROKE, ICA (INTERNAL CAROTID ARTERY): ICD-10-CM

## 2018-05-14 DIAGNOSIS — R55 SYNCOPE AND COLLAPSE: ICD-10-CM

## 2018-05-14 DIAGNOSIS — G47.33 OSA ON CPAP: ICD-10-CM

## 2018-05-14 DIAGNOSIS — I77.9 CAROTID DISEASE, BILATERAL (HCC): ICD-10-CM

## 2018-05-14 DIAGNOSIS — E78.2 MIXED HYPERLIPIDEMIA: ICD-10-CM

## 2018-05-14 DIAGNOSIS — E11.8 TYPE 2 DIABETES MELLITUS WITH COMPLICATION, UNSPECIFIED LONG TERM INSULIN USE STATUS: ICD-10-CM

## 2018-05-14 DIAGNOSIS — I25.119 CORONARY ARTERY DISEASE INVOLVING NATIVE CORONARY ARTERY OF NATIVE HEART WITH ANGINA PECTORIS (HCC): ICD-10-CM

## 2018-05-14 PROCEDURE — 99214 OFFICE O/P EST MOD 30 MIN: CPT | Performed by: INTERNAL MEDICINE

## 2018-05-14 PROCEDURE — 93000 ELECTROCARDIOGRAM COMPLETE: CPT | Performed by: INTERNAL MEDICINE

## 2018-05-14 NOTE — PROGRESS NOTES
Carlos A Boyer Jr.  [id number]  1960  58 y.o.  male    Referring Provider: GINA Felder    Reason for Follow-up Visit: Here for routine follow up   coronary artery disease stented coronary artery   Type 2 diabetes mellitus     Subjective    Mild chronic   exertional shortness of breath on exertion relieved with rest  No significant cough or wheezing  Going on for several months  No palpitations  associated chest pain  No significant pedal edema  No fever or chills  No significant expectoration  No hemoptysis  No presyncope or syncope   Chest pain with exertion in chronic stable pattern, mild substernal, pressure like. Lasts less than 5 minutes  Started several years ago  Occurs once or twice a month  No associated diaphoresis  No associated nausea  No radiation  Precipitated with exertion  Relieved with rest  Not positional  No change with intake of food or antacids  No change with breathing  Arthritic pain in small joints   Chronic low back pain    Easy fatiguability     History of present illness:  Carlos A Boyer Jr. is a 58 y.o. yo male with history of chest pain who presents today for   Chief Complaint   Patient presents with   • Coronary Artery Disease     1 yr f/u   .    History  Past Medical History:   Diagnosis Date   • Arthritis    • Asthma    • Carotid disease, bilateral    • Chest pain    • Chronic ischemic heart disease    • Chronic sinusitis    • Maribell bullosa    • Coronary artery disease    • Deviated septum    • Diabetes mellitus    • Difficulty urinating    • Diverticulitis    • Enlarged prostate    • Fatty liver    • GERD (gastroesophageal reflux disease)    • Hyperlipidemia    • Hypertension    • Hypertrophy of nasal turbinates    • Keratoderma    • Kidney stone    • Migraine    • Murmur, heart    • Myocardial infarction    • Obesity    • PONV (postoperative nausea and vomiting)    • Psoriasis    • Seizures    • Sinus congestion    • Sleep apnea    • SOB (shortness of breath)    •  Stroke    ,   Past Surgical History:   Procedure Laterality Date   • CARDIAC CATHETERIZATION  01/2016    Dr. Broadbent; widely patent previously placed stents in the left anterior descending and obstructive disease involving the diagonal branch which was treated medically   • CARDIAC CATHETERIZATION N/A 7/14/2017    Procedure: Left Heart Cath;  Surgeon: Wade Ramey MD;  Location:  [facility] CATH [facility]VASIVE [facility];  Service:    • CORONARY ANGIOPLASTY     • CORONARY STENT PLACEMENT      x 6   • ENDOSCOPIC FUNCTIONAL SINUS SURGERY (FESS) Bilateral 12/13/2017    Procedure: PROCEDURE PERFORMED:  Bilateral functional endoscopic anterior ethmoidectomy with bilateral middle meatal antrostomy Septoplasty Right kathia bullosa resection Bilateral inferior turbinate reduction via Coblation;  Surgeon: Mayank Ibarra MD;  Location:  [facility] OR;  Service:    • HERNIA REPAIR      x2 inguinal area   • KIDNEY STONE SURGERY     • KNEE SURGERY Right    • OTHER SURGICAL HISTORY      urolift   • PROSTATE SURGERY      Dr. Badillo - 2017   • THUMB AMPUTATION Left     partial   • TOE AMPUTATION Right     big   ,   Family History   Problem Relation Age of Onset   • Heart disease Father    • No Known Problems Mother    ,   Social History   Substance Use Topics   • Smoking status: Former Smoker     Years: 4.50     Types: Cigarettes     Quit date: 1993   • Smokeless tobacco: Former User     Types: Chew     Quit date: 2009   • Alcohol use No      Comment: quit 2009   ,     Medications  Current Outpatient Prescriptions   Medication Sig Dispense Refill   • albuterol (PROVENTIL HFA;VENTOLIN HFA) 108 (90 BASE) MCG/ACT inhaler Inhale 2 puffs Every 6 (Six) Hours As Needed for wheezing.     • aspirin 81 MG chewable tablet Chew 81 mg Daily.     • carboxymethylcellulose (REFRESH PLUS) 0.5 % solution Administer 1 drop to both eyes 4 (Four) Times a Day As Needed for Dry Eyes.     • fluticasone (FLONASE) 50 MCG/ACT nasal spray 2 sprays into each nostril  Daily.     • insulin aspart (novoLOG FLEXPEN) 100 UNIT/ML solution pen-injector sc pen Inject 25 Units under the skin 3 (Three) Times a Day Before Meals.     • Insulin Glargine (LANTUS SOLOSTAR) 100 UNIT/ML injection pen Inject  under the skin 2 (Two) Times a Day. 72 units @ night  60 units in morning     • isosorbide dinitrate (ISORDIL) 20 MG tablet Take 1 tablet by mouth 2 (Two) Times a Day. 60 tablet 0   • lamoTRIgine (LaMICtal) 200 MG tablet Take 1 tablet by mouth 2 (Two) Times a Day. (Patient taking differently: Take 100 mg by mouth 2 (Two) Times a Day.) 180 tablet 1   • levETIRAcetam (KEPPRA) 500 MG tablet Take 3 tablets by mouth Daily. 270 tablet 1   • levETIRAcetam (KEPPRA) 500 MG tablet Take 4 tablets by mouth Every Night. 360 tablet 1   • Liraglutide (VICTOZA SC) Inject 2.8 Units under the skin Daily.     • lisinopril (PRINIVIL,ZESTRIL) 40 MG tablet Take 20 mg by mouth Every Night.     • metFORMIN (GLUCOPHAGE) 1000 MG tablet Take 1 tablet by mouth 2 (Two) Times a Day With Meals. HOLD x 48 hours post contrast. May resume 3/18/18     • metoprolol tartrate (LOPRESSOR) 100 MG tablet Take 50 mg by mouth Every 12 (Twelve) Hours.     • Multiple Vitamin (MULTI VITAMIN PO) Take 1 tablet by mouth Daily.     • naproxen sodium (ALEVE) 220 MG tablet Take 220 mg by mouth Daily As Needed for Headache.     • nitroglycerin (NITROSTAT) 0.4 MG SL tablet Place 0.4 mg under the tongue Every 5 (Five) Minutes As Needed for chest pain. Take no more than 3 doses in 15 minutes.     • pantoprazole (PROTONIX) 40 MG EC tablet Take 40 mg by mouth 2 (Two) Times a Day.     • psyllium (METAMUCIL) 58.6 % packet Take 1 packet by mouth Daily As Needed (constipation).     • rosuvastatin (CRESTOR) 40 MG tablet Take 20 mg by mouth Every Night.     • triamterene-hydrochlorothiazide (MAXZIDE) 75-50 MG per tablet Take 0.5 tablets by mouth Daily.     • urea (CARMOL) 40 % ointment Apply 1 application topically Daily As Needed for Dry Skin. Apply to  "heels.       No current facility-administered medications for this visit.        Allergies:  Flagyl [metronidazole]; Atorvastatin; and Ciprofloxacin    Review of Systems  Review of Systems   Constitution: Positive for weakness and malaise/fatigue.   HENT: Negative.    Eyes: Negative.    Cardiovascular: Positive for chest pain and dyspnea on exertion. Negative for claudication, cyanosis, irregular heartbeat, leg swelling, near-syncope, orthopnea, palpitations, paroxysmal nocturnal dyspnea and syncope.   Respiratory: Negative.    Endocrine: Negative.    Hematologic/Lymphatic: Negative.    Skin: Negative.    Musculoskeletal: Positive for arthritis and back pain.   Gastrointestinal: Negative for anorexia.   Genitourinary: Negative.    Neurological: Positive for dizziness and light-headedness.   Psychiatric/Behavioral: Negative.        Objective     Physical Exam:  /74   Pulse 79   Ht 182.9 cm (72\")   Wt 113 kg (249 lb)   BMI 33.77 kg/m²   Physical Exam   Constitutional: He appears well-developed.   HENT:   Head: Normocephalic.   Neck: Normal carotid pulses and no JVD present. No tracheal tenderness present. Carotid bruit is not present. No tracheal deviation and no edema present.   Cardiovascular: Regular rhythm, normal heart sounds and normal pulses.    Pulmonary/Chest: Effort normal. No stridor.   Abdominal: Soft.   Neurological: He is alert. He has normal strength. No cranial nerve deficit or sensory deficit.   Skin: Skin is warm.   Psychiatric: He has a normal mood and affect. His speech is normal and behavior is normal.       Results Review:  Results for orders placed during the hospital encounter of 03/15/18   Adult Transthoracic Echo Complete W/ Cont if Necessary Per Protocol    Narrative · Left ventricular systolic function is normal. Estimated EF = 60%.  · Mild to moderate aortic valve regurgitation is present.  · Left ventricular diastolic dysfunction.  · No evidence of pulmonary hypertension is " present.  · No evidence of a patent foramen ovale. Saline test results are negative.        7/17 cath  Conclusion  Nonocclusive epicardial coronary arteries with widely patent stents in the  left anterior descending coronary artery   artery and left circumflex coronary artery.  Hyperdynamic left ventricle with ejection fraction of 75%.  LVEDP   17    mm Hg     Plan  Workup for noncardiac causes of chest pain this can be done as outpatient.  His d-dimer is within normal range  After a period of observation of stable can be discharged either later today.  Keep follow-up appointment with me  Ongoing risk factor modifications keep LDL below 70 mg/dL  Hydration   Observation       ECG 12 Lead  Date/Time: 5/14/2018 10:36 AM  Performed by: ZULEIKA DOYLE  Authorized by: ZULEIKA DOYLE   Comparison: compared with previous ECG from 3/15/2018  Rhythm: sinus rhythm  Conduction: 1st degree  ST Segments: ST segments normal  T Waves: T waves normal  Q waves: V1, V2 and V3  Clinical impression: abnormal ECG            Assessment/Plan   Patient Active Problem List   Diagnosis   • Hypotension   • Diarrhea due to drug   • Syncope and collapse   • Type 2 diabetes mellitus with complication   • Gastroesophageal reflux disease without esophagitis   • Essential hypertension   • Seizure disorder   • Carotid disease, bilateral   • Coronary artery disease involving native coronary artery of native heart with angina pectoris   • Chest pain, unspecified   • Mixed hyperlipidemia   • Chronic left arterial ischemic stroke, ICA (internal carotid artery)   • HOA on CPAP   • Chest pain   • Benign non-nodular prostatic hyperplasia with lower urinary tract symptoms   • Deviated nasal septum   • Maribell bullosa   • Hypertrophy of both inferior nasal turbinates   • Chronic sinusitis of both maxillary sinuses   • S/P FESS (functional endoscopic sinus surgery)   • Diabetic complication       No palpitations. No significant pedal edema. Compliant with  "medications and diet. Latest labs and medications reviewed.    Plan:    Keep A1c less than 7 Primary to monitor  Keep LDL below 70 mg/dl. Monitor liver and renal functions.   Monitor CBC, CMP and Lipid Panel by primary      As no particular exertional chest pain and low risk nuclear stress test with known diagonal small vessel CAD will manage medically  If he gets exertional chest pain or worsening symptoms will do cardiac cath then    Low salt/ HTN/ Heart healthy carbohydrate restricted cardiac diet as applicable to this patient's current diagnoses.   This handout has relevant information regarding shopping for food, preparing meals, what to eat at restaurants, tracking of food intake, information regarding sodium intake and salt content, how to read food labels, knowing what to eat, tips reagarding physical activity, calorie count and calorie expenditure. What foods to avoid. Information regarding alcoholic drinks along with \"good\" and \"bad\" fats.  Reiterated prior recommendations     The patient is advised to reduce or avoid caffeine or other cardiac stimulants.     The patient was advised that NSAID-type medications have three  very important potential side effects: cardiovascular complications, gastrointestinal irritation including hemorrhage and renal injuries.  Do not use anti-inflammatories such as Motrin/ibuprofen, Alleve/naprosyn, Mobic and like medications Use Tylenol instead.The patient expresses understanding of these issues and questions were answered.   Monitor for any signs of bleeding including red or dark stools. Fall precautions.       S/L NTG PRN for chest pain, call me or go to ER if has to use S/L nitroglycerin      Monitor for any signs of bleeding including red or dark stools. Fall precautions.   Patient is asked to monitor BP at home or work, several times per month and return with written values at next office visit.      Advised staying uptodate with immunizations per established standard " guidelines.          Return in about 6 months (around 11/14/2018).

## 2018-05-21 ENCOUNTER — OFFICE VISIT (OUTPATIENT)
Dept: NEUROLOGY | Facility: CLINIC | Age: 58
End: 2018-05-21

## 2018-05-21 VITALS
HEIGHT: 72 IN | WEIGHT: 254 LBS | BODY MASS INDEX: 34.4 KG/M2 | DIASTOLIC BLOOD PRESSURE: 80 MMHG | SYSTOLIC BLOOD PRESSURE: 128 MMHG

## 2018-05-21 DIAGNOSIS — E78.2 MIXED HYPERLIPIDEMIA: ICD-10-CM

## 2018-05-21 DIAGNOSIS — Z99.89 OSA ON CPAP: ICD-10-CM

## 2018-05-21 DIAGNOSIS — G40.909 SEIZURE DISORDER (HCC): Primary | ICD-10-CM

## 2018-05-21 DIAGNOSIS — I69.30 CHRONIC LEFT ARTERIAL ISCHEMIC STROKE, ICA (INTERNAL CAROTID ARTERY): ICD-10-CM

## 2018-05-21 DIAGNOSIS — I10 ESSENTIAL HYPERTENSION: ICD-10-CM

## 2018-05-21 DIAGNOSIS — G47.33 OSA ON CPAP: ICD-10-CM

## 2018-05-21 DIAGNOSIS — I77.9 CAROTID DISEASE, BILATERAL (HCC): ICD-10-CM

## 2018-05-21 DIAGNOSIS — R55 SYNCOPE AND COLLAPSE: ICD-10-CM

## 2018-05-21 PROCEDURE — 99214 OFFICE O/P EST MOD 30 MIN: CPT | Performed by: CLINICAL NURSE SPECIALIST

## 2018-05-21 NOTE — PROGRESS NOTES
Subjective     Chief Complaint   Patient presents with   • Seizures     PT states he has not had a seizure for approx 3 months. Denies stroke like symptoms at present time. PT states he did fall 3 weeks ago due to numbness in feet.     Carlos A Boyer Jr. is a 58 y.o. male ambidextrous retiree, .  He is here today for follow up for seizure and syncopal events. He was last seen 1/25/18. Patient had done well until 3/15/18 when he had a syncopal episode. He did present to [facility] ED and was admitted for observation. Apparently the patient had stood up and walked into the kitchen. He became light headed and rested his head on the refrigerator. He did have some coughing.  The next thing he knew he was on the floor and his dog was licking him on the face. Patient had vomited as there was vomitus on the floor. He states he had not been using CPAP as he had some lung congestion during that time. Work up did show mild to moderate aortic valve regurgitation. Patient sees Dr. Ramey. Work up otherwise unremarkable. He was discharged home and reports no further episodes since. He had seen pulmonology and was told he has early COPD. He has no new complaints today.      As you recall, He is a prior patient of Dr. REJI Roa.  He had an extensive work up at [facility] and found to have seizure disorder. He has been taking Keppra and Lamictal ever since. Patient also has hx of left hemispheric stroke with no residual.    Seizures    This is a chronic (2012, would occur 2-4 times a month and last up to 10 mintues) problem. Episode onset: last episode was about April 2017. Duration: last about 5 minutes now with medications. Associated symptoms include headaches. Pertinent negatives include no confusion, no visual disturbance, no chest pain, no nausea, no vomiting and no diarrhea. Characteristics include rhythmic jerking. Characteristics do not include bowel incontinence, bladder incontinence, loss of  consciousness, bit tongue or apnea. never had LOC, would have tremoring from inside out, and would have tremor in both arms and rarely in legs. would have staring , can hear Associated symptoms comments: fatigue.   Syncope   This is a new problem. The current episode started in the past 7 days. Episode frequency: at least 3 times since 1/3/17. The problem has been resolved. He lost consciousness for a period of 1 to 5 minutes (no more than 3 minutes). Exacerbated by: severe forceful cough. Associated symptoms include back pain and headaches. Pertinent negatives include no bladder incontinence, bowel incontinence, chest pain, confusion, dizziness, fever, nausea, vomiting or weakness. He has tried nothing for the symptoms. The treatment provided no relief. His past medical history is significant for HTN and seizures. leslye and has not used CPAP because of nasal surgery.               Past Medical History:   Diagnosis Date   • Arthritis    • Asthma    • Carotid disease, bilateral    • Chest pain    • Chronic ischemic heart disease    • Chronic sinusitis    • Maribell bullosa    • Coronary artery disease    • Deviated septum    • Diabetes mellitus    • Difficulty urinating    • Diverticulitis    • Enlarged prostate    • Fatty liver    • GERD (gastroesophageal reflux disease)    • Hyperlipidemia    • Hypertension    • Hypertrophy of nasal turbinates    • Keratoderma    • Kidney stone    • Migraine    • Murmur, heart    • Myocardial infarction    • Obesity    • PONV (postoperative nausea and vomiting)    • Psoriasis    • Seizures    • Sinus congestion    • Sleep apnea    • SOB (shortness of breath)    • Stroke        Past Surgical History:   Procedure Laterality Date   • CARDIAC CATHETERIZATION  01/2016    Dr. Broadbent; widely patent previously placed stents in the left anterior descending and obstructive disease involving the diagonal branch which was treated medically   • CARDIAC CATHETERIZATION N/A 7/14/2017    Procedure:  Left Heart Cath;  Surgeon: Wade Ramey MD;  Location:  [facility] CATH [facility]VAS[facility];  Service:    • CORONARY ANGIOPLASTY     • CORONARY STENT PLACEMENT      x 6   • ENDOSCOPIC FUNCTIONAL SINUS SURGERY (FESS) Bilateral 12/13/2017    Procedure: PROCEDURE PERFORMED:  Bilateral functional endoscopic anterior ethmoidectomy with bilateral middle meatal antrostomy Septoplasty Right kathia bullosa resection Bilateral inferior turbinate reduction via Coblation;  Surgeon: Mayank Ibarra MD;  Location:  [facility] OR;  Service:    • HERNIA REPAIR      x2 inguinal area   • KIDNEY STONE SURGERY     • KNEE SURGERY Right    • OTHER SURGICAL HISTORY      urolift   • PROSTATE SURGERY      Dr. Badillo - 2017   • THUMB AMPUTATION Left     partial   • TOE AMPUTATION Right     big       family history includes Heart disease in his father; No Known Problems in his mother.    Social History   Substance Use Topics   • Smoking status: Former Smoker     Years: 4.50     Types: Cigarettes     Quit date: 1993   • Smokeless tobacco: Former User     Types: Chew     Quit date: 2009   • Alcohol use No      Comment: quit 2009       Review of Systems   Constitutional: Negative.  Negative for fatigue and fever.   HENT: Positive for sinus pressure. Negative for hearing loss and postnasal drip.    Eyes: Negative.  Negative for visual disturbance.   Respiratory: Negative.  Negative for apnea, chest tightness and shortness of breath.    Cardiovascular: Positive for syncope. Negative for chest pain.   Gastrointestinal: Negative.  Negative for bowel incontinence, constipation, diarrhea, nausea and vomiting.   Endocrine: Negative.    Genitourinary: Negative.  Negative for bladder incontinence, dysuria and frequency.   Musculoskeletal: Positive for back pain. Negative for arthralgias, gait problem and myalgias.   Skin: Negative.    Allergic/Immunologic: Negative.    Neurological: Positive for seizures, syncope and headaches. Negative for dizziness, tremors,  "loss of consciousness and weakness.   Hematological: Negative.  Negative for adenopathy.   Psychiatric/Behavioral: Negative.  Negative for agitation, confusion and hallucinations.   All other systems reviewed and are negative.      Objective     /80 (BP Location: Right arm, Patient Position: Sitting, Cuff Size: Adult)   Ht 182.9 cm (72\")   Wt 115 kg (254 lb)   BMI 34.45 kg/m² , Body mass index is 34.45 kg/m².    Physical Exam   Constitutional: He is oriented to person, place, and time. He appears well-developed and well-nourished. He is cooperative.   HENT:   Head: Normocephalic.   Right Ear: Hearing and external ear normal.   Left Ear: Hearing and external ear normal.   Nose: Nose normal.   Mouth/Throat: Oropharynx is clear and moist.   Eyes: Conjunctivae, EOM and lids are normal. Pupils are equal, round, and reactive to light.   Neck: Normal range of motion. Neck supple. Carotid bruit is not present.   Cardiovascular: Normal rate, regular rhythm, S1 normal, S2 normal and normal heart sounds.    No murmur heard.  Pulmonary/Chest: Effort normal and breath sounds normal.   Frequent severe forceful cough   Abdominal: Soft. Bowel sounds are normal.   Musculoskeletal: Normal range of motion.   Neurological: He is alert and oriented to person, place, and time. He has normal strength and normal reflexes. He displays no tremor. No cranial nerve deficit or sensory deficit. He exhibits normal muscle tone. Coordination (patient has tendency to sway standing with eyes closed., no ataxia, FTN, HTS intact bilateral) abnormal. Gait normal. GCS eye subscore is 4. GCS verbal subscore is 5. GCS motor subscore is 6.   Reflex Scores:       Tricep reflexes are 2+ on the right side and 2+ on the left side.       Bicep reflexes are 2+ on the right side and 2+ on the left side.       Brachioradialis reflexes are 2+ on the right side and 2+ on the left side.       Patellar reflexes are 2+ on the right side and 2+ on the left " side.       Achilles reflexes are 2+ on the right side and 2+ on the left side.  Awake, alert. No aphasia, no dysarthria  Completes simple and complex commands    CN II:  Visual fields full.  Pupils equally reactive to light  CN III, IV, VI:  Extraocular Muscles full with no signs of nystagmus  CN V:  Facial sensory is symmetric with no asymetries.  CN VII:  Facial motor symmetric  CN VIII:  Gross hearing intact bilaterally  CN IX:  Palate elevates symmetrically  CN X:  Palate elevates symmetrically  CN XI:  Shoulder shrug symmetric  CN XII:  Tongue is midline on protrusion    Full and symmetric strength bilateral upper and lower extremities.   Skin: Skin is warm and dry.   Psychiatric: He has a normal mood and affect. His speech is normal and behavior is normal. Cognition and memory are normal.   Nursing note and vitals reviewed.      Results for orders placed or performed in visit on 04/05/18   POC Urinalysis Dipstick, Automated   Result Value Ref Range    Color Yellow Yellow, Straw, Dark Yellow, Rosalia    Clarity, UA Clear Clear    Glucose, UA 1000 mg/dL (A) Negative, 1000 mg/dL (3+) mg/dL    Bilirubin Large (3+) (A) Negative    Ketones, UA Trace (A) Negative    Specific Gravity  1.020 1.005 - 1.030    Blood, UA Negative Negative    pH, Urine 5.5 5.0 - 8.0    Protein, POC Negative Negative mg/dL    Urobilinogen, UA Normal Normal    Leukocytes Negative Negative    Nitrite, UA Negative Negative      2DECHO: Interpretation Summary   · Left ventricular systolic function is normal. Estimated EF = 60%.  · Mild to moderate aortic valve regurgitation is present.  · Left ventricular diastolic dysfunction.  · No evidence of pulmonary hypertension is present.  · No evidence of a patent foramen ovale. Saline test results are negative.          CTA CHEST: Summary:  1. No pulmonary embolism.  2. No acute lung disease.     MRI BRAIN: IMPRESSION:  1. No acute intracranial process.  2. Evidence of chronic sinusitis.    CAROTID  DUPLEX: IMPRESSION:  Impression:  1. There is less than 50% stenosis of the right internal carotid artery.  2. There is less than 50% stenosis of the left internal carotid artery.  3. Antegrade flow is demonstrated in bilateral vertebral arteries.      ASSESSMENT/PLAN    Diagnoses and all orders for this visit:    Seizure disorder    HOA on CPAP    Mixed hyperlipidemia    Chronic left arterial ischemic stroke, ICA (internal carotid artery)    Carotid disease, bilateral    Essential hypertension    Syncope and collapse      MEDICAL DECISION MAKIN. Patient now using CPAP regularly  2.  Continue with Keppra 1500 mg in AM and 2000 mg in PM  3. Continue with lamictal 200 mg BID per PCP  4.  bp managed by PCP  5. Blood glucose managed by PCP and A1C goal less than 7  6. Statin per PCP for LDL goal less than 70.  7. Continue with ASA for secondary stroke prevention  8. Patient is counseled on stroke signs and symptoms using FAST and Time Saved is Brain Saved.  9. Discussed secondary stroke prevention to include a systolic blood pressure goal of less than 140, LDL goal less than 70, and 30-40 minutes of heart rate elevating exercise 3-4 times per week. Continue antiplatelet.   10.Seizure precautions were discussed to include no tub baths, no swimming, avoiding lack of sleep, and avoiding known triggers. Education given of things that may contribute to a seizure to include, but not limited to: stressful situations, fever, fatigue, lack of sleep, low blood sugar, hyperventilation, flashing lights, and caffeine. Instructions given to take seizure medications as prescribed. Education given to family member on what to do during a seizure and care following the seizure. Education given to contact this office prior to stopping or changing any medications.  11. Former smoker, currently not using tobacco  12.Patient's Body mass index is 34.45 kg/m². BMI is above normal parameters. Recommendations include: educational  material.     allergies and all known medications/prescriptions have been reviewed using resources available on this encounter.    Return in about 4 months (around 9/21/2018).        TEENA Win         “You can access the FollowHealth Patient Portal, offered by [facility], by registering with the following website: [email or web address]”

## 2018-07-21 ENCOUNTER — HOSPITAL ENCOUNTER (EMERGENCY)
Facility: HOSPITAL | Age: 58
Discharge: HOME | End: 2018-07-22
Attending: EMERGENCY MEDICINE | Admitting: EMERGENCY MEDICINE

## 2018-07-21 ENCOUNTER — APPOINTMENT (OUTPATIENT)
Dept: CT IMAGING | Facility: HOSPITAL | Age: 58
End: 2018-07-21

## 2018-07-21 DIAGNOSIS — R10.84 ABDOMINAL PAIN, GENERALIZED: Primary | ICD-10-CM

## 2018-07-21 DIAGNOSIS — R19.7 DIARRHEA, UNSPECIFIED TYPE: ICD-10-CM

## 2018-07-21 DIAGNOSIS — R11.2 NON-INTRACTABLE VOMITING WITH NAUSEA, UNSPECIFIED VOMITING TYPE: ICD-10-CM

## 2018-07-21 LAB
ALBUMIN SERPL-MCNC: 4.4 G/DL (ref 3.5–5)
ALBUMIN/GLOB SERPL: 1.5 G/DL (ref 1.1–2.5)
ALP SERPL-CCNC: 87 U/L (ref 24–120)
ALT SERPL W P-5'-P-CCNC: 39 U/L (ref 0–54)
ANION GAP SERPL CALCULATED.3IONS-SCNC: 15 MMOL/L (ref 4–13)
AST SERPL-CCNC: 38 U/L (ref 7–45)
BASOPHILS # BLD AUTO: 0.04 10*3/MM3 (ref 0–0.2)
BASOPHILS NFR BLD AUTO: 0.7 % (ref 0–2)
BILIRUB SERPL-MCNC: 0.6 MG/DL (ref 0.1–1)
BILIRUB UR QL STRIP: NEGATIVE
BUN BLD-MCNC: 14 MG/DL (ref 5–21)
BUN/CREAT SERPL: 12.1 (ref 7–25)
CALCIUM SPEC-SCNC: 9.9 MG/DL (ref 8.4–10.4)
CHLORIDE SERPL-SCNC: 101 MMOL/L (ref 98–110)
CLARITY UR: CLEAR
CO2 SERPL-SCNC: 23 MMOL/L (ref 24–31)
COLOR UR: YELLOW
CREAT BLD-MCNC: 1.16 MG/DL (ref 0.5–1.4)
D-LACTATE SERPL-SCNC: 2 MMOL/L (ref 0.5–2)
DEPRECATED RDW RBC AUTO: 38.6 FL (ref 40–54)
EOSINOPHIL # BLD AUTO: 0.35 10*3/MM3 (ref 0–0.7)
EOSINOPHIL NFR BLD AUTO: 6 % (ref 0–4)
ERYTHROCYTE [DISTWIDTH] IN BLOOD BY AUTOMATED COUNT: 12.4 % (ref 12–15)
GFR SERPL CREATININE-BSD FRML MDRD: 64 ML/MIN/1.73
GLOBULIN UR ELPH-MCNC: 2.9 GM/DL
GLUCOSE BLD-MCNC: 160 MG/DL (ref 70–100)
GLUCOSE BLDC GLUCOMTR-MCNC: 124 MG/DL (ref 70–130)
GLUCOSE UR STRIP-MCNC: ABNORMAL MG/DL
HCT VFR BLD AUTO: 39.9 % (ref 40–52)
HGB BLD-MCNC: 13.7 G/DL (ref 14–18)
HGB UR QL STRIP.AUTO: NEGATIVE
HOLD SPECIMEN: NORMAL
HOLD SPECIMEN: NORMAL
IMM GRANULOCYTES # BLD: 0.02 10*3/MM3 (ref 0–0.03)
IMM GRANULOCYTES NFR BLD: 0.3 % (ref 0–5)
KETONES UR QL STRIP: NEGATIVE
LEUKOCYTE ESTERASE UR QL STRIP.AUTO: NEGATIVE
LIPASE SERPL-CCNC: 71 U/L (ref 23–203)
LYMPHOCYTES # BLD AUTO: 2.14 10*3/MM3 (ref 0.72–4.86)
LYMPHOCYTES NFR BLD AUTO: 36.7 % (ref 15–45)
MCH RBC QN AUTO: 29.4 PG (ref 28–32)
MCHC RBC AUTO-ENTMCNC: 34.3 G/DL (ref 33–36)
MCV RBC AUTO: 85.6 FL (ref 82–95)
MONOCYTES # BLD AUTO: 0.49 10*3/MM3 (ref 0.19–1.3)
MONOCYTES NFR BLD AUTO: 8.4 % (ref 4–12)
NEUTROPHILS # BLD AUTO: 2.79 10*3/MM3 (ref 1.87–8.4)
NEUTROPHILS NFR BLD AUTO: 47.9 % (ref 39–78)
NITRITE UR QL STRIP: NEGATIVE
NRBC BLD MANUAL-RTO: 0 /100 WBC (ref 0–0)
PH UR STRIP.AUTO: 5.5 [PH] (ref 5–8)
PLATELET # BLD AUTO: 113 10*3/MM3 (ref 130–400)
PMV BLD AUTO: 11.4 FL (ref 6–12)
POTASSIUM BLD-SCNC: 3.6 MMOL/L (ref 3.5–5.3)
PROT SERPL-MCNC: 7.3 G/DL (ref 6.3–8.7)
PROT UR QL STRIP: NEGATIVE
RBC # BLD AUTO: 4.66 10*6/MM3 (ref 4.8–5.9)
SODIUM BLD-SCNC: 139 MMOL/L (ref 135–145)
SP GR UR STRIP: 1.02 (ref 1–1.03)
UROBILINOGEN UR QL STRIP: ABNORMAL
WBC NRBC COR # BLD: 5.83 10*3/MM3 (ref 4.8–10.8)
WHOLE BLOOD HOLD SPECIMEN: NORMAL
WHOLE BLOOD HOLD SPECIMEN: NORMAL

## 2018-07-21 PROCEDURE — 82962 GLUCOSE BLOOD TEST: CPT

## 2018-07-21 PROCEDURE — 74177 CT ABD & PELVIS W/CONTRAST: CPT

## 2018-07-21 PROCEDURE — 96375 TX/PRO/DX INJ NEW DRUG ADDON: CPT

## 2018-07-21 PROCEDURE — 99284 EMERGENCY DEPT VISIT MOD MDM: CPT

## 2018-07-21 PROCEDURE — 83605 ASSAY OF LACTIC ACID: CPT | Performed by: EMERGENCY MEDICINE

## 2018-07-21 PROCEDURE — 85025 COMPLETE CBC W/AUTO DIFF WBC: CPT | Performed by: EMERGENCY MEDICINE

## 2018-07-21 PROCEDURE — 80053 COMPREHEN METABOLIC PANEL: CPT | Performed by: EMERGENCY MEDICINE

## 2018-07-21 PROCEDURE — 83690 ASSAY OF LIPASE: CPT | Performed by: EMERGENCY MEDICINE

## 2018-07-21 PROCEDURE — 25010000002 ONDANSETRON PER 1 MG: Performed by: EMERGENCY MEDICINE

## 2018-07-21 PROCEDURE — 25010000002 MORPHINE PER 10 MG: Performed by: EMERGENCY MEDICINE

## 2018-07-21 PROCEDURE — 81003 URINALYSIS AUTO W/O SCOPE: CPT | Performed by: EMERGENCY MEDICINE

## 2018-07-21 PROCEDURE — 96374 THER/PROPH/DIAG INJ IV PUSH: CPT

## 2018-07-21 PROCEDURE — 25010000002 IOPAMIDOL 61 % SOLUTION: Performed by: EMERGENCY MEDICINE

## 2018-07-21 PROCEDURE — 96376 TX/PRO/DX INJ SAME DRUG ADON: CPT

## 2018-07-21 PROCEDURE — 0 IOHEXOL 300 MG/ML SOLUTION: Performed by: EMERGENCY MEDICINE

## 2018-07-21 RX ORDER — ONDANSETRON 2 MG/ML
4 INJECTION INTRAMUSCULAR; INTRAVENOUS ONCE
Status: COMPLETED | OUTPATIENT
Start: 2018-07-21 | End: 2018-07-21

## 2018-07-21 RX ORDER — MORPHINE SULFATE 4 MG/ML
4 INJECTION, SOLUTION INTRAMUSCULAR; INTRAVENOUS ONCE
Status: COMPLETED | OUTPATIENT
Start: 2018-07-21 | End: 2018-07-21

## 2018-07-21 RX ORDER — FAMOTIDINE 10 MG/ML
20 INJECTION, SOLUTION INTRAVENOUS ONCE
Status: COMPLETED | OUTPATIENT
Start: 2018-07-21 | End: 2018-07-21

## 2018-07-21 RX ORDER — SODIUM CHLORIDE 0.9 % (FLUSH) 0.9 %
10 SYRINGE (ML) INJECTION AS NEEDED
Status: DISCONTINUED | OUTPATIENT
Start: 2018-07-21 | End: 2018-07-22 | Stop reason: HOSPADM

## 2018-07-21 RX ADMIN — FAMOTIDINE 20 MG: 10 INJECTION, SOLUTION INTRAVENOUS at 19:50

## 2018-07-21 RX ADMIN — SODIUM CHLORIDE 1000 ML: 9 INJECTION, SOLUTION INTRAVENOUS at 19:48

## 2018-07-21 RX ADMIN — ONDANSETRON 4 MG: 2 INJECTION INTRAMUSCULAR; INTRAVENOUS at 19:49

## 2018-07-21 RX ADMIN — ONDANSETRON 4 MG: 2 INJECTION INTRAMUSCULAR; INTRAVENOUS at 20:45

## 2018-07-21 RX ADMIN — IOHEXOL 50 ML: 300 INJECTION, SOLUTION INTRAVENOUS at 20:13

## 2018-07-21 RX ADMIN — IOPAMIDOL 100 ML: 612 INJECTION, SOLUTION INTRAVENOUS at 22:45

## 2018-07-21 RX ADMIN — MORPHINE SULFATE 4 MG: 4 INJECTION INTRAVENOUS at 19:52

## 2018-07-21 RX ADMIN — MORPHINE SULFATE 4 MG: 4 INJECTION INTRAVENOUS at 23:31

## 2018-07-22 VITALS
RESPIRATION RATE: 16 BRPM | OXYGEN SATURATION: 98 % | DIASTOLIC BLOOD PRESSURE: 79 MMHG | SYSTOLIC BLOOD PRESSURE: 131 MMHG | TEMPERATURE: 98.3 F | WEIGHT: 244 LBS | HEART RATE: 67 BPM | BODY MASS INDEX: 33.05 KG/M2 | HEIGHT: 72 IN

## 2018-07-22 RX ORDER — ONDANSETRON 4 MG/1
4 TABLET, ORALLY DISINTEGRATING ORAL EVERY 6 HOURS PRN
Qty: 8 TABLET | Refills: 0 | Status: ON HOLD | OUTPATIENT
Start: 2018-07-22 | End: 2019-06-28 | Stop reason: SDUPTHER

## 2018-07-22 RX ORDER — DICYCLOMINE HCL 20 MG
20 TABLET ORAL EVERY 6 HOURS PRN
Qty: 8 TABLET | Refills: 0 | Status: SHIPPED | OUTPATIENT
Start: 2018-07-22 | End: 2019-05-30

## 2018-07-22 RX ORDER — DICYCLOMINE HCL 20 MG
20 TABLET ORAL ONCE
Status: COMPLETED | OUTPATIENT
Start: 2018-07-22 | End: 2018-07-22

## 2018-07-22 RX ADMIN — DICYCLOMINE HYDROCHLORIDE 20 MG: 20 TABLET ORAL at 01:32

## 2018-07-28 ENCOUNTER — HOSPITAL ENCOUNTER (INPATIENT)
Facility: HOSPITAL | Age: 58
LOS: 2 days | Discharge: HOME | End: 2018-08-02
Attending: INTERNAL MEDICINE | Admitting: SPECIALIST

## 2018-07-28 ENCOUNTER — APPOINTMENT (OUTPATIENT)
Dept: GENERAL RADIOLOGY | Facility: HOSPITAL | Age: 58
End: 2018-07-28

## 2018-07-28 DIAGNOSIS — K81.9 CHOLECYSTITIS: ICD-10-CM

## 2018-07-28 DIAGNOSIS — R07.2 PRECORDIAL PAIN: ICD-10-CM

## 2018-07-28 DIAGNOSIS — R10.13 EPIGASTRIC PAIN: ICD-10-CM

## 2018-07-28 DIAGNOSIS — R07.9 CHEST PAIN, UNSPECIFIED TYPE: Primary | ICD-10-CM

## 2018-07-28 LAB
ALBUMIN SERPL-MCNC: 4.8 G/DL (ref 3.5–5)
ALBUMIN/GLOB SERPL: 1.5 G/DL (ref 1.1–2.5)
ALP SERPL-CCNC: 95 U/L (ref 24–120)
ALT SERPL W P-5'-P-CCNC: 35 U/L (ref 0–54)
ANION GAP SERPL CALCULATED.3IONS-SCNC: 15 MMOL/L (ref 4–13)
APTT PPP: 25.4 SECONDS (ref 24.1–34.8)
AST SERPL-CCNC: 36 U/L (ref 7–45)
BASOPHILS # BLD AUTO: 0.03 10*3/MM3 (ref 0–0.2)
BASOPHILS NFR BLD AUTO: 0.5 % (ref 0–2)
BILIRUB SERPL-MCNC: 0.7 MG/DL (ref 0.1–1)
BUN BLD-MCNC: 22 MG/DL (ref 5–21)
BUN/CREAT SERPL: 16.1 (ref 7–25)
CALCIUM SPEC-SCNC: 10 MG/DL (ref 8.4–10.4)
CHLORIDE SERPL-SCNC: 103 MMOL/L (ref 98–110)
CO2 SERPL-SCNC: 22 MMOL/L (ref 24–31)
CREAT BLD-MCNC: 1.37 MG/DL (ref 0.5–1.4)
DEPRECATED RDW RBC AUTO: 39.5 FL (ref 40–54)
EOSINOPHIL # BLD AUTO: 0.37 10*3/MM3 (ref 0–0.7)
EOSINOPHIL NFR BLD AUTO: 5.7 % (ref 0–4)
ERYTHROCYTE [DISTWIDTH] IN BLOOD BY AUTOMATED COUNT: 12.5 % (ref 12–15)
GFR SERPL CREATININE-BSD FRML MDRD: 53 ML/MIN/1.73
GLOBULIN UR ELPH-MCNC: 3.1 GM/DL
GLUCOSE BLD-MCNC: 257 MG/DL (ref 70–100)
GLUCOSE BLDC GLUCOMTR-MCNC: 133 MG/DL (ref 70–130)
HCT VFR BLD AUTO: 44.2 % (ref 40–52)
HGB BLD-MCNC: 15.1 G/DL (ref 14–18)
HOLD SPECIMEN: NORMAL
HOLD SPECIMEN: NORMAL
IMM GRANULOCYTES # BLD: 0.02 10*3/MM3 (ref 0–0.03)
IMM GRANULOCYTES NFR BLD: 0.3 % (ref 0–5)
INR PPP: 0.89 (ref 0.91–1.09)
LIPASE SERPL-CCNC: 92 U/L (ref 23–203)
LYMPHOCYTES # BLD AUTO: 1.98 10*3/MM3 (ref 0.72–4.86)
LYMPHOCYTES NFR BLD AUTO: 30.7 % (ref 15–45)
MCH RBC QN AUTO: 29.5 PG (ref 28–32)
MCHC RBC AUTO-ENTMCNC: 34.2 G/DL (ref 33–36)
MCV RBC AUTO: 86.3 FL (ref 82–95)
MONOCYTES # BLD AUTO: 0.52 10*3/MM3 (ref 0.19–1.3)
MONOCYTES NFR BLD AUTO: 8.1 % (ref 4–12)
NEUTROPHILS # BLD AUTO: 3.52 10*3/MM3 (ref 1.87–8.4)
NEUTROPHILS NFR BLD AUTO: 54.7 % (ref 39–78)
NRBC BLD MANUAL-RTO: 0 /100 WBC (ref 0–0)
PLATELET # BLD AUTO: 148 10*3/MM3 (ref 130–400)
PMV BLD AUTO: 11.9 FL (ref 6–12)
POTASSIUM BLD-SCNC: 4.7 MMOL/L (ref 3.5–5.3)
PROT SERPL-MCNC: 7.9 G/DL (ref 6.3–8.7)
PROTHROMBIN TIME: 12.3 SECONDS (ref 11.9–14.6)
RBC # BLD AUTO: 5.12 10*6/MM3 (ref 4.8–5.9)
SODIUM BLD-SCNC: 140 MMOL/L (ref 135–145)
TROPONIN I SERPL-MCNC: 0.02 NG/ML (ref 0–0.03)
TROPONIN I SERPL-MCNC: <0.012 NG/ML (ref 0–0.03)
WBC NRBC COR # BLD: 6.44 10*3/MM3 (ref 4.8–10.8)
WHOLE BLOOD HOLD SPECIMEN: NORMAL
WHOLE BLOOD HOLD SPECIMEN: NORMAL

## 2018-07-28 PROCEDURE — 63710000001 INSULIN DETEMIR PER 5 UNITS: Performed by: INTERNAL MEDICINE

## 2018-07-28 PROCEDURE — G0378 HOSPITAL OBSERVATION PER HR: HCPCS

## 2018-07-28 PROCEDURE — 25010000002 ENOXAPARIN PER 10 MG: Performed by: INTERNAL MEDICINE

## 2018-07-28 PROCEDURE — 80053 COMPREHEN METABOLIC PANEL: CPT | Performed by: NURSE PRACTITIONER

## 2018-07-28 PROCEDURE — 84484 ASSAY OF TROPONIN QUANT: CPT | Performed by: NURSE PRACTITIONER

## 2018-07-28 PROCEDURE — 94799 UNLISTED PULMONARY SVC/PX: CPT

## 2018-07-28 PROCEDURE — 85610 PROTHROMBIN TIME: CPT | Performed by: NURSE PRACTITIONER

## 2018-07-28 PROCEDURE — 93005 ELECTROCARDIOGRAM TRACING: CPT

## 2018-07-28 PROCEDURE — 84484 ASSAY OF TROPONIN QUANT: CPT

## 2018-07-28 PROCEDURE — 93005 ELECTROCARDIOGRAM TRACING: CPT | Performed by: INTERNAL MEDICINE

## 2018-07-28 PROCEDURE — 84484 ASSAY OF TROPONIN QUANT: CPT | Performed by: INTERNAL MEDICINE

## 2018-07-28 PROCEDURE — 36415 COLL VENOUS BLD VENIPUNCTURE: CPT

## 2018-07-28 PROCEDURE — 93005 ELECTROCARDIOGRAM TRACING: CPT | Performed by: NURSE PRACTITIONER

## 2018-07-28 PROCEDURE — 82962 GLUCOSE BLOOD TEST: CPT

## 2018-07-28 PROCEDURE — 99285 EMERGENCY DEPT VISIT HI MDM: CPT

## 2018-07-28 PROCEDURE — 94760 N-INVAS EAR/PLS OXIMETRY 1: CPT

## 2018-07-28 PROCEDURE — 83690 ASSAY OF LIPASE: CPT | Performed by: NURSE PRACTITIONER

## 2018-07-28 PROCEDURE — 85730 THROMBOPLASTIN TIME PARTIAL: CPT | Performed by: NURSE PRACTITIONER

## 2018-07-28 PROCEDURE — 85025 COMPLETE CBC W/AUTO DIFF WBC: CPT | Performed by: NURSE PRACTITIONER

## 2018-07-28 PROCEDURE — 71045 X-RAY EXAM CHEST 1 VIEW: CPT

## 2018-07-28 PROCEDURE — 93010 ELECTROCARDIOGRAM REPORT: CPT | Performed by: INTERNAL MEDICINE

## 2018-07-28 RX ORDER — METOPROLOL TARTRATE 50 MG/1
50 TABLET, FILM COATED ORAL EVERY 12 HOURS SCHEDULED
Status: DISCONTINUED | OUTPATIENT
Start: 2018-07-28 | End: 2018-08-02 | Stop reason: HOSPADM

## 2018-07-28 RX ORDER — PANTOPRAZOLE SODIUM 40 MG/1
40 TABLET, DELAYED RELEASE ORAL
Status: DISCONTINUED | OUTPATIENT
Start: 2018-07-29 | End: 2018-08-02 | Stop reason: HOSPADM

## 2018-07-28 RX ORDER — ROSUVASTATIN CALCIUM 20 MG/1
20 TABLET, COATED ORAL NIGHTLY
Status: DISCONTINUED | OUTPATIENT
Start: 2018-07-28 | End: 2018-08-02 | Stop reason: HOSPADM

## 2018-07-28 RX ORDER — NITROGLYCERIN 0.4 MG/1
0.4 TABLET SUBLINGUAL
Status: DISCONTINUED | OUTPATIENT
Start: 2018-07-28 | End: 2018-08-02 | Stop reason: HOSPADM

## 2018-07-28 RX ORDER — MORPHINE SULFATE 4 MG/ML
4 INJECTION, SOLUTION INTRAMUSCULAR; INTRAVENOUS EVERY 4 HOURS PRN
Status: DISCONTINUED | OUTPATIENT
Start: 2018-07-28 | End: 2018-08-02 | Stop reason: HOSPADM

## 2018-07-28 RX ORDER — LEVETIRACETAM 500 MG/1
2000 TABLET ORAL NIGHTLY
Status: DISCONTINUED | OUTPATIENT
Start: 2018-07-28 | End: 2018-08-02 | Stop reason: HOSPADM

## 2018-07-28 RX ORDER — ALUMINA, MAGNESIA, AND SIMETHICONE 2400; 2400; 240 MG/30ML; MG/30ML; MG/30ML
15 SUSPENSION ORAL EVERY 6 HOURS PRN
Status: DISCONTINUED | OUTPATIENT
Start: 2018-07-28 | End: 2018-08-02 | Stop reason: HOSPADM

## 2018-07-28 RX ORDER — ASPIRIN 81 MG/1
324 TABLET, CHEWABLE ORAL ONCE
Status: COMPLETED | OUTPATIENT
Start: 2018-07-28 | End: 2018-07-28

## 2018-07-28 RX ORDER — DICYCLOMINE HCL 20 MG
20 TABLET ORAL EVERY 6 HOURS PRN
Status: DISCONTINUED | OUTPATIENT
Start: 2018-07-28 | End: 2018-08-02 | Stop reason: HOSPADM

## 2018-07-28 RX ORDER — LAMOTRIGINE 100 MG/1
100 TABLET ORAL 2 TIMES DAILY
Status: DISCONTINUED | OUTPATIENT
Start: 2018-07-28 | End: 2018-08-02 | Stop reason: HOSPADM

## 2018-07-28 RX ORDER — FLUTICASONE PROPIONATE 50 MCG
2 SPRAY, SUSPENSION (ML) NASAL DAILY
Status: DISCONTINUED | OUTPATIENT
Start: 2018-07-29 | End: 2018-08-02 | Stop reason: HOSPADM

## 2018-07-28 RX ORDER — SODIUM CHLORIDE 0.9 % (FLUSH) 0.9 %
1-10 SYRINGE (ML) INJECTION AS NEEDED
Status: DISCONTINUED | OUTPATIENT
Start: 2018-07-28 | End: 2018-08-01

## 2018-07-28 RX ORDER — TRIAMTERENE AND HYDROCHLOROTHIAZIDE 75; 50 MG/1; MG/1
0.5 TABLET ORAL DAILY
Status: DISCONTINUED | OUTPATIENT
Start: 2018-07-29 | End: 2018-08-02 | Stop reason: HOSPADM

## 2018-07-28 RX ORDER — NITROGLYCERIN 0.4 MG/1
0.4 TABLET SUBLINGUAL ONCE
Status: COMPLETED | OUTPATIENT
Start: 2018-07-28 | End: 2018-07-28

## 2018-07-28 RX ORDER — LEVETIRACETAM 500 MG/1
1500 TABLET ORAL DAILY
Status: DISCONTINUED | OUTPATIENT
Start: 2018-07-28 | End: 2018-07-28 | Stop reason: SDUPTHER

## 2018-07-28 RX ORDER — LAMOTRIGINE 100 MG/1
200 TABLET ORAL 2 TIMES DAILY
Status: DISCONTINUED | OUTPATIENT
Start: 2018-07-28 | End: 2018-07-28

## 2018-07-28 RX ORDER — LISINOPRIL 20 MG/1
20 TABLET ORAL NIGHTLY
Status: DISCONTINUED | OUTPATIENT
Start: 2018-07-28 | End: 2018-08-02 | Stop reason: HOSPADM

## 2018-07-28 RX ORDER — BISACODYL 5 MG/1
5 TABLET, DELAYED RELEASE ORAL DAILY PRN
Status: DISCONTINUED | OUTPATIENT
Start: 2018-07-28 | End: 2018-08-02 | Stop reason: HOSPADM

## 2018-07-28 RX ORDER — IPRATROPIUM BROMIDE AND ALBUTEROL SULFATE 2.5; .5 MG/3ML; MG/3ML
3 SOLUTION RESPIRATORY (INHALATION) EVERY 4 HOURS PRN
Status: DISCONTINUED | OUTPATIENT
Start: 2018-07-28 | End: 2018-07-30 | Stop reason: SDUPTHER

## 2018-07-28 RX ORDER — NALOXONE HCL 0.4 MG/ML
0.4 VIAL (ML) INJECTION
Status: DISCONTINUED | OUTPATIENT
Start: 2018-07-28 | End: 2018-08-02 | Stop reason: HOSPADM

## 2018-07-28 RX ORDER — ONDANSETRON 2 MG/ML
4 INJECTION INTRAMUSCULAR; INTRAVENOUS EVERY 6 HOURS PRN
Status: DISCONTINUED | OUTPATIENT
Start: 2018-07-28 | End: 2018-08-02 | Stop reason: HOSPADM

## 2018-07-28 RX ORDER — ISOSORBIDE DINITRATE 20 MG/1
20 TABLET ORAL
Status: DISCONTINUED | OUTPATIENT
Start: 2018-07-28 | End: 2018-08-02 | Stop reason: HOSPADM

## 2018-07-28 RX ORDER — POLYVINYL ALCOHOL 14 MG/ML
2 SOLUTION/ DROPS OPHTHALMIC
Status: DISCONTINUED | OUTPATIENT
Start: 2018-07-28 | End: 2018-08-02 | Stop reason: HOSPADM

## 2018-07-28 RX ORDER — LEVETIRACETAM 500 MG/1
1500 TABLET ORAL DAILY
Status: DISCONTINUED | OUTPATIENT
Start: 2018-07-29 | End: 2018-08-02 | Stop reason: HOSPADM

## 2018-07-28 RX ORDER — ASPIRIN 81 MG/1
81 TABLET, CHEWABLE ORAL DAILY
Status: DISCONTINUED | OUTPATIENT
Start: 2018-07-28 | End: 2018-07-28 | Stop reason: SDUPTHER

## 2018-07-28 RX ORDER — NITROGLYCERIN 0.4 MG/1
0.4 TABLET SUBLINGUAL
Status: COMPLETED | OUTPATIENT
Start: 2018-07-28 | End: 2018-07-28

## 2018-07-28 RX ORDER — ASPIRIN 81 MG/1
81 TABLET, CHEWABLE ORAL DAILY
Status: DISCONTINUED | OUTPATIENT
Start: 2018-07-29 | End: 2018-08-02 | Stop reason: HOSPADM

## 2018-07-28 RX ADMIN — METFORMIN HYDROCHLORIDE 1000 MG: 500 TABLET ORAL at 20:47

## 2018-07-28 RX ADMIN — INSULIN DETEMIR 72 UNITS: 100 INJECTION, SOLUTION SUBCUTANEOUS at 20:47

## 2018-07-28 RX ADMIN — ISOSORBIDE DINITRATE 20 MG: 20 TABLET ORAL at 20:47

## 2018-07-28 RX ADMIN — NITROGLYCERIN 0.4 MG: 0.4 TABLET SUBLINGUAL at 17:38

## 2018-07-28 RX ADMIN — NITROGLYCERIN 0.4 MG: 0.4 TABLET SUBLINGUAL at 13:59

## 2018-07-28 RX ADMIN — LEVETIRACETAM 2000 MG: 500 TABLET, FILM COATED ORAL at 20:48

## 2018-07-28 RX ADMIN — ROSUVASTATIN CALCIUM 20 MG: 20 TABLET, FILM COATED ORAL at 20:48

## 2018-07-28 RX ADMIN — LAMOTRIGINE 100 MG: 100 TABLET ORAL at 20:48

## 2018-07-28 RX ADMIN — ASPIRIN 81 MG 243 MG: 81 TABLET ORAL at 14:12

## 2018-07-28 RX ADMIN — LISINOPRIL 20 MG: 20 TABLET ORAL at 20:48

## 2018-07-28 RX ADMIN — NITROGLYCERIN 0.4 MG: 0.4 TABLET SUBLINGUAL at 14:13

## 2018-07-28 RX ADMIN — ENOXAPARIN SODIUM 40 MG: 100 INJECTION SUBCUTANEOUS at 20:47

## 2018-07-28 RX ADMIN — METOPROLOL TARTRATE 50 MG: 50 TABLET ORAL at 20:48

## 2018-07-29 ENCOUNTER — APPOINTMENT (OUTPATIENT)
Dept: CARDIOLOGY | Facility: HOSPITAL | Age: 58
End: 2018-07-29
Attending: INTERNAL MEDICINE

## 2018-07-29 LAB
ANION GAP SERPL CALCULATED.3IONS-SCNC: 15 MMOL/L (ref 4–13)
ARTICHOKE IGE QN: 89 MG/DL (ref 0–99)
BH CV STRESS BP STAGE 1: NORMAL
BH CV STRESS BP STAGE 2: NORMAL
BH CV STRESS BP STAGE 3: NORMAL
BH CV STRESS BP STAGE 4: NORMAL
BH CV STRESS DOSE DOBUTAMINE STAGE 1: 10
BH CV STRESS DOSE DOBUTAMINE STAGE 2: 20
BH CV STRESS DOSE DOBUTAMINE STAGE 3: 30
BH CV STRESS DOSE DOBUTAMINE STAGE 4: 40
BH CV STRESS DURATION MIN STAGE 1: 3
BH CV STRESS DURATION MIN STAGE 2: 3
BH CV STRESS DURATION MIN STAGE 3: 3
BH CV STRESS DURATION MIN STAGE 4: 3
BH CV STRESS DURATION SEC STAGE 1: 0
BH CV STRESS DURATION SEC STAGE 2: 0
BH CV STRESS DURATION SEC STAGE 3: 0
BH CV STRESS DURATION SEC STAGE 4: 0
BH CV STRESS ECHO POST STRESS EJECTION FRACTION EF: 60 %
BH CV STRESS HR STAGE 1: 74
BH CV STRESS HR STAGE 2: 76
BH CV STRESS HR STAGE 3: 109
BH CV STRESS HR STAGE 4: 142
BH CV STRESS PROTOCOL 1: NORMAL
BH CV STRESS RECOVERY BP: NORMAL MMHG
BH CV STRESS RECOVERY HR: 94 BPM
BH CV STRESS STAGE 1: 1
BH CV STRESS STAGE 2: 2
BH CV STRESS STAGE 3: 3
BH CV STRESS STAGE 4: 4
BUN BLD-MCNC: 21 MG/DL (ref 5–21)
BUN/CREAT SERPL: 16.3 (ref 7–25)
CALCIUM SPEC-SCNC: 9.7 MG/DL (ref 8.4–10.4)
CHLORIDE SERPL-SCNC: 101 MMOL/L (ref 98–110)
CHOLEST SERPL-MCNC: 167 MG/DL (ref 130–200)
CO2 SERPL-SCNC: 25 MMOL/L (ref 24–31)
CREAT BLD-MCNC: 1.29 MG/DL (ref 0.5–1.4)
GFR SERPL CREATININE-BSD FRML MDRD: 57 ML/MIN/1.73
GLUCOSE BLD-MCNC: 188 MG/DL (ref 70–100)
GLUCOSE BLDC GLUCOMTR-MCNC: 132 MG/DL (ref 70–130)
GLUCOSE BLDC GLUCOMTR-MCNC: 180 MG/DL (ref 70–130)
GLUCOSE BLDC GLUCOMTR-MCNC: 226 MG/DL (ref 70–130)
GLUCOSE BLDC GLUCOMTR-MCNC: 264 MG/DL (ref 70–130)
HBA1C MFR BLD: 7.2 %
HDLC SERPL-MCNC: 32 MG/DL
LDLC/HDLC SERPL: ABNORMAL {RATIO}
LV EF 2D ECHO EST: 55 %
MAXIMAL PREDICTED HEART RATE: 160 BPM
PERCENT MAX PREDICTED HR: 88.75 %
POTASSIUM BLD-SCNC: 3.8 MMOL/L (ref 3.5–5.3)
SODIUM BLD-SCNC: 141 MMOL/L (ref 135–145)
STRESS BASELINE BP: NORMAL MMHG
STRESS BASELINE HR: 70 BPM
STRESS PERCENT HR: 104 %
STRESS POST PEAK BP: NORMAL MMHG
STRESS POST PEAK HR: 142 BPM
STRESS TARGET HR: 136 BPM
TRIGL SERPL-MCNC: 409 MG/DL (ref 0–149)
TROPONIN I SERPL-MCNC: <0.012 NG/ML (ref 0–0.03)

## 2018-07-29 PROCEDURE — 63710000001 INSULIN DETEMIR PER 5 UNITS: Performed by: INTERNAL MEDICINE

## 2018-07-29 PROCEDURE — 83036 HEMOGLOBIN GLYCOSYLATED A1C: CPT | Performed by: INTERNAL MEDICINE

## 2018-07-29 PROCEDURE — 99223 1ST HOSP IP/OBS HIGH 75: CPT | Performed by: INTERNAL MEDICINE

## 2018-07-29 PROCEDURE — 63710000001 INSULIN LISPRO (HUMAN) PER 5 UNITS: Performed by: INTERNAL MEDICINE

## 2018-07-29 PROCEDURE — 93325 DOPPLER ECHO COLOR FLOW MAPG: CPT

## 2018-07-29 PROCEDURE — 25010000002 PERFLUTREN 6.52 MG/ML SUSPENSION: Performed by: INTERNAL MEDICINE

## 2018-07-29 PROCEDURE — 93352 ADMIN ECG CONTRAST AGENT: CPT | Performed by: INTERNAL MEDICINE

## 2018-07-29 PROCEDURE — 82962 GLUCOSE BLOOD TEST: CPT

## 2018-07-29 PROCEDURE — 93320 DOPPLER ECHO COMPLETE: CPT

## 2018-07-29 PROCEDURE — 93350 STRESS TTE ONLY: CPT

## 2018-07-29 PROCEDURE — 94799 UNLISTED PULMONARY SVC/PX: CPT

## 2018-07-29 PROCEDURE — 25010000002 ENOXAPARIN PER 10 MG: Performed by: INTERNAL MEDICINE

## 2018-07-29 PROCEDURE — 94640 AIRWAY INHALATION TREATMENT: CPT

## 2018-07-29 PROCEDURE — 80061 LIPID PANEL: CPT | Performed by: INTERNAL MEDICINE

## 2018-07-29 PROCEDURE — 25010000003 DOBUTAMINE PER 250 MG: Performed by: INTERNAL MEDICINE

## 2018-07-29 PROCEDURE — 93350 STRESS TTE ONLY: CPT | Performed by: INTERNAL MEDICINE

## 2018-07-29 PROCEDURE — G0378 HOSPITAL OBSERVATION PER HR: HCPCS

## 2018-07-29 PROCEDURE — 93017 CV STRESS TEST TRACING ONLY: CPT

## 2018-07-29 PROCEDURE — 93018 CV STRESS TEST I&R ONLY: CPT | Performed by: INTERNAL MEDICINE

## 2018-07-29 PROCEDURE — 80048 BASIC METABOLIC PNL TOTAL CA: CPT | Performed by: NURSE PRACTITIONER

## 2018-07-29 PROCEDURE — 94760 N-INVAS EAR/PLS OXIMETRY 1: CPT

## 2018-07-29 RX ORDER — DOBUTAMINE HYDROCHLORIDE 100 MG/100ML
10-50 INJECTION INTRAVENOUS CONTINUOUS
Status: DISCONTINUED | OUTPATIENT
Start: 2018-07-29 | End: 2018-08-02 | Stop reason: HOSPADM

## 2018-07-29 RX ORDER — IPRATROPIUM BROMIDE AND ALBUTEROL SULFATE 2.5; .5 MG/3ML; MG/3ML
3 SOLUTION RESPIRATORY (INHALATION)
Status: DISCONTINUED | OUTPATIENT
Start: 2018-07-29 | End: 2018-07-30

## 2018-07-29 RX ADMIN — METOPROLOL TARTRATE 50 MG: 50 TABLET ORAL at 20:22

## 2018-07-29 RX ADMIN — LAMOTRIGINE 100 MG: 100 TABLET ORAL at 12:51

## 2018-07-29 RX ADMIN — METFORMIN HYDROCHLORIDE 1000 MG: 500 TABLET ORAL at 09:04

## 2018-07-29 RX ADMIN — LAMOTRIGINE 100 MG: 100 TABLET ORAL at 20:22

## 2018-07-29 RX ADMIN — LEVETIRACETAM 2000 MG: 500 TABLET, FILM COATED ORAL at 20:22

## 2018-07-29 RX ADMIN — LISINOPRIL 20 MG: 20 TABLET ORAL at 23:22

## 2018-07-29 RX ADMIN — ASPIRIN 81 MG: 81 TABLET, CHEWABLE ORAL at 09:04

## 2018-07-29 RX ADMIN — NITROGLYCERIN 0.4 MG: 0.4 TABLET SUBLINGUAL at 11:52

## 2018-07-29 RX ADMIN — INSULIN LISPRO 25 UNITS: 100 INJECTION, SOLUTION INTRAVENOUS; SUBCUTANEOUS at 13:01

## 2018-07-29 RX ADMIN — METOPROLOL TARTRATE 50 MG: 50 TABLET ORAL at 09:07

## 2018-07-29 RX ADMIN — PANTOPRAZOLE SODIUM 40 MG: 40 TABLET, DELAYED RELEASE ORAL at 17:08

## 2018-07-29 RX ADMIN — ROSUVASTATIN CALCIUM 20 MG: 20 TABLET, FILM COATED ORAL at 20:22

## 2018-07-29 RX ADMIN — IPRATROPIUM BROMIDE AND ALBUTEROL SULFATE 3 ML: 2.5; .5 SOLUTION RESPIRATORY (INHALATION) at 18:54

## 2018-07-29 RX ADMIN — IPRATROPIUM BROMIDE AND ALBUTEROL SULFATE 3 ML: 2.5; .5 SOLUTION RESPIRATORY (INHALATION) at 14:56

## 2018-07-29 RX ADMIN — INSULIN DETEMIR 72 UNITS: 100 INJECTION, SOLUTION SUBCUTANEOUS at 20:22

## 2018-07-29 RX ADMIN — INSULIN LISPRO 25 UNITS: 100 INJECTION, SOLUTION INTRAVENOUS; SUBCUTANEOUS at 17:06

## 2018-07-29 RX ADMIN — LEVETIRACETAM 1500 MG: 500 TABLET, FILM COATED ORAL at 09:05

## 2018-07-29 RX ADMIN — PERFLUTREN 8.48 MG: 6.52 INJECTION, SUSPENSION INTRAVENOUS at 11:08

## 2018-07-29 RX ADMIN — TRIAMTERENE AND HYDROCHLOROTHIAZIDE 0.5 TABLET: 75; 50 TABLET ORAL at 09:04

## 2018-07-29 RX ADMIN — PANTOPRAZOLE SODIUM 40 MG: 40 TABLET, DELAYED RELEASE ORAL at 09:04

## 2018-07-29 RX ADMIN — ATROPINE SULFATE 0.3 MG: 0.1 INJECTION PARENTERAL at 11:35

## 2018-07-29 RX ADMIN — ENOXAPARIN SODIUM 40 MG: 100 INJECTION SUBCUTANEOUS at 20:21

## 2018-07-29 RX ADMIN — INSULIN DETEMIR 60 UNITS: 100 INJECTION, SOLUTION SUBCUTANEOUS at 09:03

## 2018-07-29 RX ADMIN — ISOSORBIDE DINITRATE 20 MG: 20 TABLET ORAL at 09:04

## 2018-07-29 RX ADMIN — FLUTICASONE PROPIONATE 2 SPRAY: 50 SPRAY, METERED NASAL at 09:11

## 2018-07-29 RX ADMIN — Medication 10 MCG/KG/MIN: at 11:24

## 2018-07-29 RX ADMIN — ISOSORBIDE DINITRATE 20 MG: 20 TABLET ORAL at 17:06

## 2018-07-30 ENCOUNTER — APPOINTMENT (OUTPATIENT)
Dept: ULTRASOUND IMAGING | Facility: HOSPITAL | Age: 58
End: 2018-07-30

## 2018-07-30 ENCOUNTER — ANESTHESIA (OUTPATIENT)
Dept: GASTROENTEROLOGY | Facility: HOSPITAL | Age: 58
End: 2018-07-30

## 2018-07-30 ENCOUNTER — ANESTHESIA EVENT (OUTPATIENT)
Dept: GASTROENTEROLOGY | Facility: HOSPITAL | Age: 58
End: 2018-07-30

## 2018-07-30 PROBLEM — R10.13 EPIGASTRIC PAIN: Status: ACTIVE | Noted: 2018-07-28

## 2018-07-30 LAB
ANION GAP SERPL CALCULATED.3IONS-SCNC: 13 MMOL/L (ref 4–13)
BUN BLD-MCNC: 23 MG/DL (ref 5–21)
BUN/CREAT SERPL: 20.5 (ref 7–25)
CALCIUM SPEC-SCNC: 10 MG/DL (ref 8.4–10.4)
CHLORIDE SERPL-SCNC: 102 MMOL/L (ref 98–110)
CO2 SERPL-SCNC: 25 MMOL/L (ref 24–31)
CREAT BLD-MCNC: 1.12 MG/DL (ref 0.5–1.4)
GFR SERPL CREATININE-BSD FRML MDRD: 67 ML/MIN/1.73
GLUCOSE BLD-MCNC: 186 MG/DL (ref 70–100)
GLUCOSE BLDC GLUCOMTR-MCNC: 161 MG/DL (ref 70–130)
GLUCOSE BLDC GLUCOMTR-MCNC: 163 MG/DL (ref 70–130)
GLUCOSE BLDC GLUCOMTR-MCNC: 179 MG/DL (ref 70–130)
POTASSIUM BLD-SCNC: 4.7 MMOL/L (ref 3.5–5.3)
SODIUM BLD-SCNC: 140 MMOL/L (ref 135–145)
TSH SERPL DL<=0.05 MIU/L-ACNC: 1.59 MIU/ML (ref 0.47–4.68)

## 2018-07-30 PROCEDURE — 63710000001 INSULIN DETEMIR PER 5 UNITS: Performed by: INTERNAL MEDICINE

## 2018-07-30 PROCEDURE — 87081 CULTURE SCREEN ONLY: CPT | Performed by: INTERNAL MEDICINE

## 2018-07-30 PROCEDURE — 0DB98ZX EXCISION OF DUODENUM, VIA NATURAL OR ARTIFICIAL OPENING ENDOSCOPIC, DIAGNOSTIC: ICD-10-PCS | Performed by: INTERNAL MEDICINE

## 2018-07-30 PROCEDURE — 43239 EGD BIOPSY SINGLE/MULTIPLE: CPT | Performed by: INTERNAL MEDICINE

## 2018-07-30 PROCEDURE — 94799 UNLISTED PULMONARY SVC/PX: CPT

## 2018-07-30 PROCEDURE — G0378 HOSPITAL OBSERVATION PER HR: HCPCS

## 2018-07-30 PROCEDURE — 25010000002 ENOXAPARIN PER 10 MG: Performed by: INTERNAL MEDICINE

## 2018-07-30 PROCEDURE — 99232 SBSQ HOSP IP/OBS MODERATE 35: CPT | Performed by: INTERNAL MEDICINE

## 2018-07-30 PROCEDURE — 80048 BASIC METABOLIC PNL TOTAL CA: CPT | Performed by: NURSE PRACTITIONER

## 2018-07-30 PROCEDURE — 84443 ASSAY THYROID STIM HORMONE: CPT | Performed by: INTERNAL MEDICINE

## 2018-07-30 PROCEDURE — 82962 GLUCOSE BLOOD TEST: CPT

## 2018-07-30 PROCEDURE — 63710000001 INSULIN LISPRO (HUMAN) PER 5 UNITS: Performed by: INTERNAL MEDICINE

## 2018-07-30 PROCEDURE — 94760 N-INVAS EAR/PLS OXIMETRY 1: CPT

## 2018-07-30 PROCEDURE — 99204 OFFICE O/P NEW MOD 45 MIN: CPT | Performed by: INTERNAL MEDICINE

## 2018-07-30 PROCEDURE — 76705 ECHO EXAM OF ABDOMEN: CPT

## 2018-07-30 RX ORDER — SODIUM CHLORIDE 9 MG/ML
500 INJECTION, SOLUTION INTRAVENOUS CONTINUOUS PRN
Status: DISCONTINUED | OUTPATIENT
Start: 2018-07-30 | End: 2018-08-01

## 2018-07-30 RX ORDER — SODIUM CHLORIDE 0.9 % (FLUSH) 0.9 %
3 SYRINGE (ML) INJECTION AS NEEDED
Status: DISCONTINUED | OUTPATIENT
Start: 2018-07-30 | End: 2018-07-30 | Stop reason: HOSPADM

## 2018-07-30 RX ORDER — IPRATROPIUM BROMIDE AND ALBUTEROL SULFATE 2.5; .5 MG/3ML; MG/3ML
3 SOLUTION RESPIRATORY (INHALATION) EVERY 4 HOURS PRN
Status: DISCONTINUED | OUTPATIENT
Start: 2018-07-30 | End: 2018-08-02 | Stop reason: HOSPADM

## 2018-07-30 RX ORDER — SUCRALFATE 1 G/1
1 TABLET ORAL
Status: DISCONTINUED | OUTPATIENT
Start: 2018-07-30 | End: 2018-08-02 | Stop reason: HOSPADM

## 2018-07-30 RX ORDER — SODIUM CHLORIDE 9 MG/ML
100 INJECTION, SOLUTION INTRAVENOUS CONTINUOUS
Status: DISCONTINUED | OUTPATIENT
Start: 2018-07-31 | End: 2018-08-01

## 2018-07-30 RX ADMIN — SUCRALFATE 1 G: 1 TABLET ORAL at 18:07

## 2018-07-30 RX ADMIN — PANTOPRAZOLE SODIUM 40 MG: 40 TABLET, DELAYED RELEASE ORAL at 08:44

## 2018-07-30 RX ADMIN — METOPROLOL TARTRATE 50 MG: 50 TABLET ORAL at 20:32

## 2018-07-30 RX ADMIN — ISOSORBIDE DINITRATE 20 MG: 20 TABLET ORAL at 18:07

## 2018-07-30 RX ADMIN — LISINOPRIL 20 MG: 20 TABLET ORAL at 20:32

## 2018-07-30 RX ADMIN — ASPIRIN 81 MG: 81 TABLET, CHEWABLE ORAL at 14:30

## 2018-07-30 RX ADMIN — TRIAMTERENE AND HYDROCHLOROTHIAZIDE 0.5 TABLET: 75; 50 TABLET ORAL at 08:44

## 2018-07-30 RX ADMIN — LEVETIRACETAM 1500 MG: 500 TABLET, FILM COATED ORAL at 14:30

## 2018-07-30 RX ADMIN — ISOSORBIDE DINITRATE 20 MG: 20 TABLET ORAL at 08:44

## 2018-07-30 RX ADMIN — INSULIN DETEMIR 72 UNITS: 100 INJECTION, SOLUTION SUBCUTANEOUS at 20:35

## 2018-07-30 RX ADMIN — FLUTICASONE PROPIONATE 2 SPRAY: 50 SPRAY, METERED NASAL at 08:45

## 2018-07-30 RX ADMIN — SUCRALFATE 1 G: 1 TABLET ORAL at 20:33

## 2018-07-30 RX ADMIN — ENOXAPARIN SODIUM 40 MG: 100 INJECTION SUBCUTANEOUS at 20:32

## 2018-07-30 RX ADMIN — INSULIN DETEMIR 60 UNITS: 100 INJECTION, SOLUTION SUBCUTANEOUS at 08:49

## 2018-07-30 RX ADMIN — LAMOTRIGINE 100 MG: 100 TABLET ORAL at 14:30

## 2018-07-30 RX ADMIN — LEVETIRACETAM 2000 MG: 500 TABLET, FILM COATED ORAL at 20:33

## 2018-07-30 RX ADMIN — ROSUVASTATIN CALCIUM 20 MG: 20 TABLET, FILM COATED ORAL at 20:32

## 2018-07-30 RX ADMIN — IPRATROPIUM BROMIDE AND ALBUTEROL SULFATE 3 ML: 2.5; .5 SOLUTION RESPIRATORY (INHALATION) at 07:43

## 2018-07-30 RX ADMIN — SODIUM CHLORIDE 500 ML: 0.9 INJECTION, SOLUTION INTRAVENOUS at 12:07

## 2018-07-30 RX ADMIN — METOPROLOL TARTRATE 50 MG: 50 TABLET ORAL at 08:44

## 2018-07-30 RX ADMIN — INSULIN LISPRO 25 UNITS: 100 INJECTION, SOLUTION INTRAVENOUS; SUBCUTANEOUS at 18:07

## 2018-07-30 RX ADMIN — PANTOPRAZOLE SODIUM 40 MG: 40 TABLET, DELAYED RELEASE ORAL at 18:07

## 2018-07-30 RX ADMIN — LAMOTRIGINE 100 MG: 100 TABLET ORAL at 20:33

## 2018-07-30 NOTE — ANESTHESIA PREPROCEDURE EVALUATION
Anesthesia Evaluation     Patient summary reviewed and Nursing notes reviewed   history of anesthetic complications: PONV  NPO Solid Status: > 8 hours  NPO Liquid Status: > 8 hours           Airway   Mallampati: III  Neck ROM: full  possible difficult intubation  Dental    (+) poor dentition    Comment: Multiple decayed/caries of the molars, both left/right and upper and lower      Pulmonary     breath sounds clear to auscultation  (+) asthma (uses inhalers prn), sleep apnea,   (-) COPD, not a smoker  Cardiovascular   Exercise tolerance: poor (<4 METS)    ECG reviewed  PT is on anticoagulation therapy  Patient on routine beta blocker and Beta blocker given within 24 hours of surgery  Rhythm: regular  Rate: normal    (+) hypertension, valvular problems/murmurs (mild ai, mild mr on echo 10/2016) MR and AI, past MI (x3) , CAD, cardiac stents (5 stents- most recent Feb 2016) more than 12 months ago angina (rare, no recent increase in frequency), PVD (carotid stenosis), hyperlipidemia,  carotid artery disease    ROS comment: 7.28.18 echo with EF 55%    Neuro/Psych  (+) seizures (last seizure 4 months ago), CVA (May 2012- right sided weakness, has improved over time but still has some residual),     GI/Hepatic/Renal/Endo    (+) obesity, morbid obesity, GERD,  liver disease, diabetes mellitus type 2 using insulin,     Musculoskeletal     Abdominal    Substance History      OB/GYN          Other   (+) arthritis                       Anesthesia Plan    ASA 3     general     intravenous induction   Anesthetic plan and risks discussed with patient.

## 2018-07-30 NOTE — ANESTHESIA POSTPROCEDURE EVALUATION
Patient: Carlos A Boyer Jr.    Procedure Summary     Date:  07/30/18 Room / Location:  [facility] ENDOSCOPY 2 /  [facility]    Anesthesia Start:  1302 Anesthesia Stop:  1315    Procedure:  ESOPHAGOGASTRODUODENOSCOPY WITH ANESTHESIA (N/A ) Diagnosis:       Epigastric pain      (Epigastric pain [R10.13])    Surgeon:  Benitez Mas MD Provider:  Ranjit Burgess CRNA    Anesthesia Type:  general ASA Status:  3          Anesthesia Type: general  Last vitals  BP   111/63 (07/30/18 0825)   Temp   97.4 °F (36.3 °C) (07/30/18 0825)   Pulse   78 (07/30/18 0825)   Resp   18 (07/30/18 0825)     SpO2   99 % (07/30/18 0825)     Post Anesthesia Care and Evaluation    Patient location during evaluation: PHASE II  Patient participation: complete - patient participated  Level of consciousness: awake  Pain score: 0  Pain management: adequate  Airway patency: patent  Anesthetic complications: No anesthetic complications  PONV Status: none  Cardiovascular status: acceptable  Respiratory status: acceptable  Hydration status: acceptable  No anesthesia care post op

## 2018-07-31 ENCOUNTER — APPOINTMENT (OUTPATIENT)
Dept: NUCLEAR MEDICINE | Facility: HOSPITAL | Age: 58
End: 2018-07-31

## 2018-07-31 PROBLEM — R07.2 PRECORDIAL PAIN: Status: ACTIVE | Noted: 2018-07-28

## 2018-07-31 LAB
ANION GAP SERPL CALCULATED.3IONS-SCNC: 13 MMOL/L (ref 4–13)
BASOPHILS # BLD AUTO: 0.05 10*3/MM3 (ref 0–0.2)
BASOPHILS NFR BLD AUTO: 0.9 % (ref 0–2)
BUN BLD-MCNC: 21 MG/DL (ref 5–21)
BUN/CREAT SERPL: 19.6 (ref 7–25)
CALCIUM SPEC-SCNC: 9.6 MG/DL (ref 8.4–10.4)
CHLORIDE SERPL-SCNC: 101 MMOL/L (ref 98–110)
CO2 SERPL-SCNC: 24 MMOL/L (ref 24–31)
CREAT BLD-MCNC: 1.07 MG/DL (ref 0.5–1.4)
DEPRECATED RDW RBC AUTO: 40 FL (ref 40–54)
EOSINOPHIL # BLD AUTO: 0.5 10*3/MM3 (ref 0–0.7)
EOSINOPHIL NFR BLD AUTO: 8.6 % (ref 0–4)
ERYTHROCYTE [DISTWIDTH] IN BLOOD BY AUTOMATED COUNT: 12.5 % (ref 12–15)
GFR SERPL CREATININE-BSD FRML MDRD: 70 ML/MIN/1.73
GLUCOSE BLD-MCNC: 196 MG/DL (ref 70–100)
GLUCOSE BLDC GLUCOMTR-MCNC: 103 MG/DL (ref 70–130)
GLUCOSE BLDC GLUCOMTR-MCNC: 187 MG/DL (ref 70–130)
GLUCOSE BLDC GLUCOMTR-MCNC: 190 MG/DL (ref 70–130)
HCT VFR BLD AUTO: 42.7 % (ref 40–52)
HGB BLD-MCNC: 14.2 G/DL (ref 14–18)
LYMPHOCYTES # BLD AUTO: 2.01 10*3/MM3 (ref 0.72–4.86)
LYMPHOCYTES NFR BLD AUTO: 34.5 % (ref 15–45)
MCH RBC QN AUTO: 29.2 PG (ref 28–32)
MCHC RBC AUTO-ENTMCNC: 33.3 G/DL (ref 33–36)
MCV RBC AUTO: 87.7 FL (ref 82–95)
MONOCYTES # BLD AUTO: 0.58 10*3/MM3 (ref 0.19–1.3)
MONOCYTES NFR BLD AUTO: 9.9 % (ref 4–12)
NEUTROPHILS # BLD AUTO: 2.67 10*3/MM3 (ref 1.87–8.4)
NEUTROPHILS NFR BLD AUTO: 45.8 % (ref 39–78)
PLATELET # BLD AUTO: 123 10*3/MM3 (ref 130–400)
PMV BLD AUTO: 11.7 FL (ref 6–12)
POTASSIUM BLD-SCNC: 4.6 MMOL/L (ref 3.5–5.3)
RBC # BLD AUTO: 4.87 10*6/MM3 (ref 4.8–5.9)
SODIUM BLD-SCNC: 138 MMOL/L (ref 135–145)
UREASE TISS QL: NEGATIVE
WBC NRBC COR # BLD: 5.83 10*3/MM3 (ref 4.8–10.8)

## 2018-07-31 PROCEDURE — A9537 TC99M MEBROFENIN: HCPCS | Performed by: FAMILY MEDICINE

## 2018-07-31 PROCEDURE — 94799 UNLISTED PULMONARY SVC/PX: CPT

## 2018-07-31 PROCEDURE — 82962 GLUCOSE BLOOD TEST: CPT

## 2018-07-31 PROCEDURE — 63710000001 INSULIN DETEMIR PER 5 UNITS: Performed by: INTERNAL MEDICINE

## 2018-07-31 PROCEDURE — 99232 SBSQ HOSP IP/OBS MODERATE 35: CPT | Performed by: INTERNAL MEDICINE

## 2018-07-31 PROCEDURE — 78226 HEPATOBILIARY SYSTEM IMAGING: CPT

## 2018-07-31 PROCEDURE — 25010000002 ENOXAPARIN PER 10 MG: Performed by: SPECIALIST

## 2018-07-31 PROCEDURE — 85025 COMPLETE CBC W/AUTO DIFF WBC: CPT | Performed by: NURSE PRACTITIONER

## 2018-07-31 PROCEDURE — 80048 BASIC METABOLIC PNL TOTAL CA: CPT | Performed by: NURSE PRACTITIONER

## 2018-07-31 PROCEDURE — 94760 N-INVAS EAR/PLS OXIMETRY 1: CPT

## 2018-07-31 PROCEDURE — 0 TECHNETIUM TC 99M MEBROFENIN KIT: Performed by: FAMILY MEDICINE

## 2018-07-31 RX ORDER — ASPIRIN 325 MG
325 TABLET ORAL ONCE
Status: COMPLETED | OUTPATIENT
Start: 2018-07-31 | End: 2018-07-31

## 2018-07-31 RX ORDER — KIT FOR THE PREPARATION OF TECHNETIUM TC 99M MEBROFENIN 45 MG/10ML
1 INJECTION, POWDER, LYOPHILIZED, FOR SOLUTION INTRAVENOUS
Status: COMPLETED | OUTPATIENT
Start: 2018-07-31 | End: 2018-07-31

## 2018-07-31 RX ORDER — ASPIRIN 325 MG
TABLET ORAL
Status: COMPLETED
Start: 2018-07-31 | End: 2018-07-31

## 2018-07-31 RX ADMIN — LISINOPRIL 20 MG: 20 TABLET ORAL at 21:20

## 2018-07-31 RX ADMIN — INSULIN DETEMIR 72 UNITS: 100 INJECTION, SOLUTION SUBCUTANEOUS at 21:24

## 2018-07-31 RX ADMIN — ROSUVASTATIN CALCIUM 20 MG: 20 TABLET, FILM COATED ORAL at 21:20

## 2018-07-31 RX ADMIN — LAMOTRIGINE 100 MG: 100 TABLET ORAL at 21:20

## 2018-07-31 RX ADMIN — PANTOPRAZOLE SODIUM 40 MG: 40 TABLET, DELAYED RELEASE ORAL at 09:04

## 2018-07-31 RX ADMIN — ASPIRIN 325 MG: 325 TABLET, COATED ORAL at 14:06

## 2018-07-31 RX ADMIN — TRIAMTERENE AND HYDROCHLOROTHIAZIDE 0.5 TABLET: 75; 50 TABLET ORAL at 09:05

## 2018-07-31 RX ADMIN — SUCRALFATE 1 G: 1 TABLET ORAL at 21:20

## 2018-07-31 RX ADMIN — FLUTICASONE PROPIONATE 2 SPRAY: 50 SPRAY, METERED NASAL at 09:05

## 2018-07-31 RX ADMIN — METOPROLOL TARTRATE 50 MG: 50 TABLET ORAL at 09:04

## 2018-07-31 RX ADMIN — SODIUM CHLORIDE 100 ML/HR: 9 INJECTION, SOLUTION INTRAVENOUS at 06:44

## 2018-07-31 RX ADMIN — LEVETIRACETAM 2000 MG: 500 TABLET, FILM COATED ORAL at 21:20

## 2018-07-31 RX ADMIN — MEBROFENIN 1 DOSE: 45 INJECTION, POWDER, LYOPHILIZED, FOR SOLUTION INTRAVENOUS at 10:53

## 2018-07-31 RX ADMIN — Medication 325 MG: at 14:06

## 2018-07-31 RX ADMIN — ENOXAPARIN SODIUM 30 MG: 30 INJECTION SUBCUTANEOUS at 22:24

## 2018-07-31 RX ADMIN — SUCRALFATE 1 G: 1 TABLET ORAL at 09:04

## 2018-07-31 RX ADMIN — SUCRALFATE 1 G: 1 TABLET ORAL at 17:28

## 2018-07-31 RX ADMIN — METOPROLOL TARTRATE 50 MG: 50 TABLET ORAL at 21:20

## 2018-07-31 RX ADMIN — ISOSORBIDE DINITRATE 20 MG: 20 TABLET ORAL at 17:28

## 2018-07-31 RX ADMIN — ISOSORBIDE DINITRATE 20 MG: 20 TABLET ORAL at 09:05

## 2018-07-31 RX ADMIN — PANTOPRAZOLE SODIUM 40 MG: 40 TABLET, DELAYED RELEASE ORAL at 17:28

## 2018-08-01 ENCOUNTER — ANESTHESIA EVENT (OUTPATIENT)
Dept: PERIOP | Facility: HOSPITAL | Age: 58
End: 2018-08-01

## 2018-08-01 ENCOUNTER — ANESTHESIA (OUTPATIENT)
Dept: PERIOP | Facility: HOSPITAL | Age: 58
End: 2018-08-01

## 2018-08-01 ENCOUNTER — APPOINTMENT (OUTPATIENT)
Dept: GENERAL RADIOLOGY | Facility: HOSPITAL | Age: 58
End: 2018-08-01

## 2018-08-01 LAB
ANION GAP SERPL CALCULATED.3IONS-SCNC: 14 MMOL/L (ref 4–13)
BUN BLD-MCNC: 18 MG/DL (ref 5–21)
BUN/CREAT SERPL: 18.9 (ref 7–25)
CALCIUM SPEC-SCNC: 9.7 MG/DL (ref 8.4–10.4)
CHLORIDE SERPL-SCNC: 103 MMOL/L (ref 98–110)
CO2 SERPL-SCNC: 22 MMOL/L (ref 24–31)
CREAT BLD-MCNC: 0.95 MG/DL (ref 0.5–1.4)
DEPRECATED RDW RBC AUTO: 39.7 FL (ref 40–54)
ERYTHROCYTE [DISTWIDTH] IN BLOOD BY AUTOMATED COUNT: 12.3 % (ref 12–15)
GFR SERPL CREATININE-BSD FRML MDRD: 81 ML/MIN/1.73
GLUCOSE BLD-MCNC: 190 MG/DL (ref 70–100)
GLUCOSE BLDC GLUCOMTR-MCNC: 137 MG/DL (ref 70–130)
GLUCOSE BLDC GLUCOMTR-MCNC: 178 MG/DL (ref 70–130)
GLUCOSE BLDC GLUCOMTR-MCNC: 198 MG/DL (ref 70–130)
GLUCOSE BLDC GLUCOMTR-MCNC: 207 MG/DL (ref 70–130)
GLUCOSE BLDC GLUCOMTR-MCNC: 237 MG/DL (ref 70–130)
HCT VFR BLD AUTO: 41.5 % (ref 40–52)
HGB BLD-MCNC: 14.2 G/DL (ref 14–18)
MCH RBC QN AUTO: 29.8 PG (ref 28–32)
MCHC RBC AUTO-ENTMCNC: 34.2 G/DL (ref 33–36)
MCV RBC AUTO: 87.2 FL (ref 82–95)
PLATELET # BLD AUTO: 116 10*3/MM3 (ref 130–400)
PMV BLD AUTO: 11.4 FL (ref 6–12)
POTASSIUM BLD-SCNC: 4.7 MMOL/L (ref 3.5–5.3)
RBC # BLD AUTO: 4.76 10*6/MM3 (ref 4.8–5.9)
SODIUM BLD-SCNC: 139 MMOL/L (ref 135–145)
WBC NRBC COR # BLD: 5.09 10*3/MM3 (ref 4.8–10.8)

## 2018-08-01 PROCEDURE — 99232 SBSQ HOSP IP/OBS MODERATE 35: CPT | Performed by: INTERNAL MEDICINE

## 2018-08-01 PROCEDURE — 25010000002 CEFOXITIN PER 1 G: Performed by: SPECIALIST

## 2018-08-01 PROCEDURE — 63710000001 INSULIN LISPRO (HUMAN) PER 5 UNITS: Performed by: INTERNAL MEDICINE

## 2018-08-01 PROCEDURE — 76000 FLUOROSCOPY <1 HR PHYS/QHP: CPT

## 2018-08-01 PROCEDURE — 74300 X-RAY BILE DUCTS/PANCREAS: CPT

## 2018-08-01 PROCEDURE — 25010000002 KETOROLAC TROMETHAMINE PER 15 MG: Performed by: NURSE ANESTHETIST, CERTIFIED REGISTERED

## 2018-08-01 PROCEDURE — 85027 COMPLETE CBC AUTOMATED: CPT | Performed by: NURSE PRACTITIONER

## 2018-08-01 PROCEDURE — 25010000002 ONDANSETRON PER 1 MG: Performed by: ANESTHESIOLOGY

## 2018-08-01 PROCEDURE — BF131ZZ FLUOROSCOPY OF GALLBLADDER AND BILE DUCTS USING LOW OSMOLAR CONTRAST: ICD-10-PCS | Performed by: SPECIALIST

## 2018-08-01 PROCEDURE — 25010000002 FENTANYL CITRATE (PF) 250 MCG/5ML SOLUTION: Performed by: NURSE ANESTHETIST, CERTIFIED REGISTERED

## 2018-08-01 PROCEDURE — 0FT44ZZ RESECTION OF GALLBLADDER, PERCUTANEOUS ENDOSCOPIC APPROACH: ICD-10-PCS | Performed by: SPECIALIST

## 2018-08-01 PROCEDURE — 63710000001 INSULIN DETEMIR PER 5 UNITS: Performed by: INTERNAL MEDICINE

## 2018-08-01 PROCEDURE — 25010000002 ONDANSETRON PER 1 MG: Performed by: NURSE ANESTHETIST, CERTIFIED REGISTERED

## 2018-08-01 PROCEDURE — 82962 GLUCOSE BLOOD TEST: CPT

## 2018-08-01 PROCEDURE — C1726 CATH, BAL DIL, NON-VASCULAR: HCPCS | Performed by: SPECIALIST

## 2018-08-01 PROCEDURE — 25010000002 FENTANYL CITRATE (PF) 100 MCG/2ML SOLUTION: Performed by: ANESTHESIOLOGY

## 2018-08-01 PROCEDURE — 88304 TISSUE EXAM BY PATHOLOGIST: CPT | Performed by: SPECIALIST

## 2018-08-01 PROCEDURE — 25010000002 NEOSTIGMINE PER 0.5 MG: Performed by: NURSE ANESTHETIST, CERTIFIED REGISTERED

## 2018-08-01 PROCEDURE — 25010000002 MIDAZOLAM PER 1 MG: Performed by: ANESTHESIOLOGY

## 2018-08-01 PROCEDURE — 25010000002 IOPAMIDOL 61 % SOLUTION: Performed by: SPECIALIST

## 2018-08-01 PROCEDURE — 25010000002 DEXAMETHASONE PER 1 MG: Performed by: NURSE ANESTHETIST, CERTIFIED REGISTERED

## 2018-08-01 PROCEDURE — 25010000002 CEFOXITIN 2 G/11.1ML IV PUSH SYRINGE KIT (PAD): Performed by: SPECIALIST

## 2018-08-01 PROCEDURE — 80048 BASIC METABOLIC PNL TOTAL CA: CPT | Performed by: NURSE PRACTITIONER

## 2018-08-01 PROCEDURE — 25010000002 PROPOFOL 10 MG/ML EMULSION: Performed by: NURSE ANESTHETIST, CERTIFIED REGISTERED

## 2018-08-01 RX ORDER — LIDOCAINE HYDROCHLORIDE 40 MG/ML
SOLUTION TOPICAL AS NEEDED
Status: DISCONTINUED | OUTPATIENT
Start: 2018-08-01 | End: 2018-08-01 | Stop reason: SURG

## 2018-08-01 RX ORDER — LABETALOL HYDROCHLORIDE 5 MG/ML
5 INJECTION, SOLUTION INTRAVENOUS
Status: DISCONTINUED | OUTPATIENT
Start: 2018-08-01 | End: 2018-08-01 | Stop reason: HOSPADM

## 2018-08-01 RX ORDER — NALOXONE HCL 0.4 MG/ML
0.4 VIAL (ML) INJECTION AS NEEDED
Status: DISCONTINUED | OUTPATIENT
Start: 2018-08-01 | End: 2018-08-01 | Stop reason: HOSPADM

## 2018-08-01 RX ORDER — BUPIVACAINE HYDROCHLORIDE AND EPINEPHRINE 5; 5 MG/ML; UG/ML
INJECTION, SOLUTION PERINEURAL AS NEEDED
Status: DISCONTINUED | OUTPATIENT
Start: 2018-08-01 | End: 2018-08-01 | Stop reason: HOSPADM

## 2018-08-01 RX ORDER — IPRATROPIUM BROMIDE AND ALBUTEROL SULFATE 2.5; .5 MG/3ML; MG/3ML
3 SOLUTION RESPIRATORY (INHALATION) ONCE AS NEEDED
Status: DISCONTINUED | OUTPATIENT
Start: 2018-08-01 | End: 2018-08-01 | Stop reason: HOSPADM

## 2018-08-01 RX ORDER — FAMOTIDINE 10 MG/ML
20 INJECTION, SOLUTION INTRAVENOUS EVERY 12 HOURS SCHEDULED
Status: DISCONTINUED | OUTPATIENT
Start: 2018-08-01 | End: 2018-08-02 | Stop reason: HOSPADM

## 2018-08-01 RX ORDER — MEPERIDINE HYDROCHLORIDE 50 MG/ML
12.5 INJECTION INTRAMUSCULAR; INTRAVENOUS; SUBCUTANEOUS
Status: DISCONTINUED | OUTPATIENT
Start: 2018-08-01 | End: 2018-08-01 | Stop reason: HOSPADM

## 2018-08-01 RX ORDER — ONDANSETRON 8 MG/1
8 TABLET, ORALLY DISINTEGRATING ORAL EVERY 6 HOURS PRN
Status: DISCONTINUED | OUTPATIENT
Start: 2018-08-01 | End: 2018-08-02 | Stop reason: HOSPADM

## 2018-08-01 RX ORDER — SUCRALFATE ORAL 1 G/10ML
1 SUSPENSION ORAL EVERY 6 HOURS SCHEDULED
Status: DISCONTINUED | OUTPATIENT
Start: 2018-08-01 | End: 2018-08-02

## 2018-08-01 RX ORDER — MIDAZOLAM HYDROCHLORIDE 1 MG/ML
1 INJECTION INTRAMUSCULAR; INTRAVENOUS
Status: DISCONTINUED | OUTPATIENT
Start: 2018-08-01 | End: 2018-08-01 | Stop reason: HOSPADM

## 2018-08-01 RX ORDER — SODIUM CHLORIDE 0.9 % (FLUSH) 0.9 %
1-10 SYRINGE (ML) INJECTION AS NEEDED
Status: DISCONTINUED | OUTPATIENT
Start: 2018-08-01 | End: 2018-08-01 | Stop reason: HOSPADM

## 2018-08-01 RX ORDER — FENTANYL CITRATE 50 UG/ML
INJECTION, SOLUTION INTRAMUSCULAR; INTRAVENOUS AS NEEDED
Status: DISCONTINUED | OUTPATIENT
Start: 2018-08-01 | End: 2018-08-01 | Stop reason: SURG

## 2018-08-01 RX ORDER — FAMOTIDINE 20 MG/1
20 TABLET, FILM COATED ORAL EVERY 12 HOURS SCHEDULED
Status: DISCONTINUED | OUTPATIENT
Start: 2018-08-01 | End: 2018-08-02 | Stop reason: HOSPADM

## 2018-08-01 RX ORDER — MORPHINE SULFATE 2 MG/ML
2 INJECTION, SOLUTION INTRAMUSCULAR; INTRAVENOUS
Status: DISCONTINUED | OUTPATIENT
Start: 2018-08-01 | End: 2018-08-01 | Stop reason: HOSPADM

## 2018-08-01 RX ORDER — HYDRALAZINE HYDROCHLORIDE 20 MG/ML
5 INJECTION INTRAMUSCULAR; INTRAVENOUS
Status: DISCONTINUED | OUTPATIENT
Start: 2018-08-01 | End: 2018-08-01 | Stop reason: HOSPADM

## 2018-08-01 RX ORDER — MAGNESIUM HYDROXIDE 1200 MG/15ML
LIQUID ORAL AS NEEDED
Status: DISCONTINUED | OUTPATIENT
Start: 2018-08-01 | End: 2018-08-01 | Stop reason: HOSPADM

## 2018-08-01 RX ORDER — MIDAZOLAM HYDROCHLORIDE 1 MG/ML
2 INJECTION INTRAMUSCULAR; INTRAVENOUS
Status: DISCONTINUED | OUTPATIENT
Start: 2018-08-01 | End: 2018-08-01 | Stop reason: HOSPADM

## 2018-08-01 RX ORDER — LORAZEPAM 1 MG/1
1 TABLET ORAL EVERY 6 HOURS PRN
Status: DISCONTINUED | OUTPATIENT
Start: 2018-08-01 | End: 2018-08-02 | Stop reason: HOSPADM

## 2018-08-01 RX ORDER — FENTANYL CITRATE 50 UG/ML
25 INJECTION, SOLUTION INTRAMUSCULAR; INTRAVENOUS
Status: DISCONTINUED | OUTPATIENT
Start: 2018-08-01 | End: 2018-08-01 | Stop reason: HOSPADM

## 2018-08-01 RX ORDER — DEXTROSE MONOHYDRATE 25 G/50ML
12.5 INJECTION, SOLUTION INTRAVENOUS AS NEEDED
Status: DISCONTINUED | OUTPATIENT
Start: 2018-08-01 | End: 2018-08-01 | Stop reason: HOSPADM

## 2018-08-01 RX ORDER — DEXTROSE AND SODIUM CHLORIDE 5; .45 G/100ML; G/100ML
75 INJECTION, SOLUTION INTRAVENOUS CONTINUOUS
Status: DISCONTINUED | OUTPATIENT
Start: 2018-08-01 | End: 2018-08-02 | Stop reason: HOSPADM

## 2018-08-01 RX ORDER — KETOROLAC TROMETHAMINE 30 MG/ML
INJECTION, SOLUTION INTRAMUSCULAR; INTRAVENOUS AS NEEDED
Status: DISCONTINUED | OUTPATIENT
Start: 2018-08-01 | End: 2018-08-01 | Stop reason: SURG

## 2018-08-01 RX ORDER — HYDROCODONE BITARTRATE AND ACETAMINOPHEN 7.5; 325 MG/1; MG/1
1 TABLET ORAL EVERY 4 HOURS PRN
Status: DISCONTINUED | OUTPATIENT
Start: 2018-08-01 | End: 2018-08-02 | Stop reason: HOSPADM

## 2018-08-01 RX ORDER — FAMOTIDINE 10 MG/ML
20 INJECTION, SOLUTION INTRAVENOUS
Status: DISCONTINUED | OUTPATIENT
Start: 2018-08-01 | End: 2018-08-01 | Stop reason: HOSPADM

## 2018-08-01 RX ORDER — OXYCODONE AND ACETAMINOPHEN 10; 325 MG/1; MG/1
1 TABLET ORAL ONCE AS NEEDED
Status: DISCONTINUED | OUTPATIENT
Start: 2018-08-01 | End: 2018-08-01 | Stop reason: HOSPADM

## 2018-08-01 RX ORDER — GLYCOPYRROLATE 0.2 MG/ML
INJECTION INTRAMUSCULAR; INTRAVENOUS AS NEEDED
Status: DISCONTINUED | OUTPATIENT
Start: 2018-08-01 | End: 2018-08-01 | Stop reason: SURG

## 2018-08-01 RX ORDER — DIPHENHYDRAMINE HYDROCHLORIDE 50 MG/ML
12.5 INJECTION INTRAMUSCULAR; INTRAVENOUS
Status: DISCONTINUED | OUTPATIENT
Start: 2018-08-01 | End: 2018-08-01 | Stop reason: HOSPADM

## 2018-08-01 RX ORDER — DEXAMETHASONE SODIUM PHOSPHATE 4 MG/ML
INJECTION, SOLUTION INTRA-ARTICULAR; INTRALESIONAL; INTRAMUSCULAR; INTRAVENOUS; SOFT TISSUE AS NEEDED
Status: DISCONTINUED | OUTPATIENT
Start: 2018-08-01 | End: 2018-08-01 | Stop reason: SURG

## 2018-08-01 RX ORDER — SODIUM CHLORIDE, SODIUM LACTATE, POTASSIUM CHLORIDE, CALCIUM CHLORIDE 600; 310; 30; 20 MG/100ML; MG/100ML; MG/100ML; MG/100ML
9 INJECTION, SOLUTION INTRAVENOUS CONTINUOUS
Status: DISCONTINUED | OUTPATIENT
Start: 2018-08-01 | End: 2018-08-01

## 2018-08-01 RX ORDER — ONDANSETRON 2 MG/ML
4 INJECTION INTRAMUSCULAR; INTRAVENOUS ONCE AS NEEDED
Status: COMPLETED | OUTPATIENT
Start: 2018-08-01 | End: 2018-08-01

## 2018-08-01 RX ORDER — METOPROLOL TARTRATE 5 MG/5ML
2.5 INJECTION INTRAVENOUS
Status: DISCONTINUED | OUTPATIENT
Start: 2018-08-01 | End: 2018-08-01 | Stop reason: HOSPADM

## 2018-08-01 RX ORDER — PROPOFOL 10 MG/ML
VIAL (ML) INTRAVENOUS AS NEEDED
Status: DISCONTINUED | OUTPATIENT
Start: 2018-08-01 | End: 2018-08-01 | Stop reason: SURG

## 2018-08-01 RX ORDER — ONDANSETRON 2 MG/ML
INJECTION INTRAMUSCULAR; INTRAVENOUS AS NEEDED
Status: DISCONTINUED | OUTPATIENT
Start: 2018-08-01 | End: 2018-08-01 | Stop reason: SURG

## 2018-08-01 RX ORDER — VASOPRESSIN 20 U/ML
INJECTION PARENTERAL AS NEEDED
Status: DISCONTINUED | OUTPATIENT
Start: 2018-08-01 | End: 2018-08-01 | Stop reason: SURG

## 2018-08-01 RX ORDER — MORPHINE SULFATE 4 MG/ML
4 INJECTION, SOLUTION INTRAMUSCULAR; INTRAVENOUS
Status: DISCONTINUED | OUTPATIENT
Start: 2018-08-01 | End: 2018-08-01 | Stop reason: SDUPTHER

## 2018-08-01 RX ORDER — SODIUM CHLORIDE 9 MG/ML
INJECTION, SOLUTION INTRAVENOUS AS NEEDED
Status: DISCONTINUED | OUTPATIENT
Start: 2018-08-01 | End: 2018-08-01 | Stop reason: HOSPADM

## 2018-08-01 RX ORDER — PHENYLEPHRINE HCL IN 0.9% NACL 0.8MG/10ML
SYRINGE (ML) INTRAVENOUS AS NEEDED
Status: DISCONTINUED | OUTPATIENT
Start: 2018-08-01 | End: 2018-08-01 | Stop reason: SURG

## 2018-08-01 RX ORDER — ROCURONIUM BROMIDE 10 MG/ML
INJECTION, SOLUTION INTRAVENOUS AS NEEDED
Status: DISCONTINUED | OUTPATIENT
Start: 2018-08-01 | End: 2018-08-01 | Stop reason: SURG

## 2018-08-01 RX ADMIN — PROPOFOL 140 MG: 10 INJECTION, EMULSION INTRAVENOUS at 13:23

## 2018-08-01 RX ADMIN — Medication 2 G: at 13:37

## 2018-08-01 RX ADMIN — ISOSORBIDE DINITRATE 20 MG: 20 TABLET ORAL at 17:40

## 2018-08-01 RX ADMIN — ONDANSETRON 4 MG: 2 INJECTION INTRAMUSCULAR; INTRAVENOUS at 15:35

## 2018-08-01 RX ADMIN — VASOPRESSIN 0.5 UNITS: 20 INJECTION INTRAVENOUS at 13:37

## 2018-08-01 RX ADMIN — CEFOXITIN SODIUM 2 G: 2 POWDER, FOR SOLUTION INTRAVENOUS at 19:38

## 2018-08-01 RX ADMIN — GLYCOPYRROLATE 0.4 MG: 0.2 INJECTION, SOLUTION INTRAMUSCULAR; INTRAVENOUS at 14:41

## 2018-08-01 RX ADMIN — METHYLCELLULOSE 1000 MG: 500 TABLET ORAL at 21:20

## 2018-08-01 RX ADMIN — HYDROCODONE BITARTRATE AND ACETAMINOPHEN 1 TABLET: 7.5; 325 TABLET ORAL at 19:46

## 2018-08-01 RX ADMIN — SUCRALFATE 1 G: 1 TABLET ORAL at 19:40

## 2018-08-01 RX ADMIN — METOPROLOL TARTRATE 50 MG: 50 TABLET ORAL at 21:20

## 2018-08-01 RX ADMIN — DEXAMETHASONE SODIUM PHOSPHATE 4 MG: 4 INJECTION, SOLUTION INTRAMUSCULAR; INTRAVENOUS at 14:33

## 2018-08-01 RX ADMIN — LAMOTRIGINE 100 MG: 100 TABLET ORAL at 21:20

## 2018-08-01 RX ADMIN — FENTANYL CITRATE 150 MCG: 50 INJECTION INTRAMUSCULAR; INTRAVENOUS at 13:23

## 2018-08-01 RX ADMIN — Medication 4 MG: at 14:41

## 2018-08-01 RX ADMIN — ROCURONIUM BROMIDE 30 MG: 10 INJECTION INTRAVENOUS at 13:33

## 2018-08-01 RX ADMIN — Medication 80 MCG: at 13:23

## 2018-08-01 RX ADMIN — SODIUM CHLORIDE, POTASSIUM CHLORIDE, SODIUM LACTATE AND CALCIUM CHLORIDE: 600; 310; 30; 20 INJECTION, SOLUTION INTRAVENOUS at 14:49

## 2018-08-01 RX ADMIN — FENTANYL CITRATE 100 MCG: 50 INJECTION INTRAMUSCULAR; INTRAVENOUS at 13:57

## 2018-08-01 RX ADMIN — LIDOCAINE HYDROCHLORIDE 1 EACH: 40 SOLUTION TOPICAL at 13:23

## 2018-08-01 RX ADMIN — SODIUM CHLORIDE, POTASSIUM CHLORIDE, SODIUM LACTATE AND CALCIUM CHLORIDE 9 ML/HR: 600; 310; 30; 20 INJECTION, SOLUTION INTRAVENOUS at 12:47

## 2018-08-01 RX ADMIN — SUCRALFATE 1 G: 1 TABLET ORAL at 17:40

## 2018-08-01 RX ADMIN — KETOROLAC TROMETHAMINE 15 MG: 30 INJECTION, SOLUTION INTRAMUSCULAR at 14:33

## 2018-08-01 RX ADMIN — LEVETIRACETAM 2000 MG: 500 TABLET, FILM COATED ORAL at 21:20

## 2018-08-01 RX ADMIN — ONDANSETRON HYDROCHLORIDE 4 MG: 2 SOLUTION INTRAMUSCULAR; INTRAVENOUS at 14:33

## 2018-08-01 RX ADMIN — FAMOTIDINE 20 MG: 10 INJECTION, SOLUTION INTRAVENOUS at 12:45

## 2018-08-01 RX ADMIN — LISINOPRIL 20 MG: 20 TABLET ORAL at 21:20

## 2018-08-01 RX ADMIN — FENTANYL CITRATE 25 MCG: 50 INJECTION, SOLUTION INTRAMUSCULAR; INTRAVENOUS at 15:23

## 2018-08-01 RX ADMIN — MIDAZOLAM 2 MG: 1 INJECTION INTRAMUSCULAR; INTRAVENOUS at 12:45

## 2018-08-01 RX ADMIN — INSULIN LISPRO 25 UNITS: 100 INJECTION, SOLUTION INTRAVENOUS; SUBCUTANEOUS at 17:40

## 2018-08-01 RX ADMIN — METOPROLOL TARTRATE 50 MG: 50 TABLET ORAL at 08:33

## 2018-08-01 RX ADMIN — FAMOTIDINE 20 MG: 20 TABLET, FILM COATED ORAL at 19:40

## 2018-08-01 RX ADMIN — ROSUVASTATIN CALCIUM 20 MG: 20 TABLET, FILM COATED ORAL at 21:20

## 2018-08-01 RX ADMIN — VASOPRESSIN 0.5 UNITS: 20 INJECTION INTRAVENOUS at 13:28

## 2018-08-01 RX ADMIN — PANTOPRAZOLE SODIUM 40 MG: 40 TABLET, DELAYED RELEASE ORAL at 17:40

## 2018-08-01 RX ADMIN — INSULIN DETEMIR 72 UNITS: 100 INJECTION, SOLUTION SUBCUTANEOUS at 21:21

## 2018-08-01 NOTE — PROGRESS NOTES
[facility] Medicine Services  INPATIENT PROGRESS NOTE    Length of Stay: 1  Date of Admission: 7/28/2018  Primary Care Physician: GINA Felder    Subjective   Chief Complaint: Follow-up epigastric pain  HPI   The patient is sitting up in the chair. He is awaiting surgery this afternoon with Dr. Ann. He tells me he feels ok today, no new complaints. He does have some shortness of breath with exertion, which he feels is at his baseline. He has had nausea and abdominal pain throughout the night, no vomiting. He has been belching this morning and states the odor is foul.     Review of Systems   All pertinent negatives and positives are as above. All other systems have been reviewed and are negative unless otherwise stated.     Objective    Temp:  [97.2 °F (36.2 °C)-98.6 °F (37 °C)] 97.5 °F (36.4 °C)  Heart Rate:  [69-83] 82  Resp:  [17-20] 18  BP: ()/(54-88) 115/88  Physical Exam  Constitutional: He is oriented to person, place, and time. He appears well-developed and well-nourished. No distress.   HENT:   Head: Normocephalic and atraumatic.   Neck: Normal range of motion. Neck supple. No JVD present. No tracheal deviation present. No thyromegaly present.   Cardiovascular: Normal rate, regular rhythm, normal heart sounds and intact distal pulses.  Exam reveals no gallop and no friction rub.    No murmur heard.  Normal sinus rhythm 70-81   Pulmonary/Chest: Effort normal. He has no wheezes. He has no rales.   Diminished in bilateral bases   Abdominal: Soft. Bowel sounds are normal. He exhibits no distension. There is tenderness (GAMA/ RUQ/ LUQ tenderness). There is no guarding.   Musculoskeletal: Normal range of motion. He exhibits edema (Trace edema BLE/BUE). He exhibits no tenderness.   Lymphadenopathy:     He has no cervical adenopathy.   Neurological: He is alert and oriented to person, place, and time. No cranial nerve deficit.   Skin: Skin is warm and dry. No  rash noted. No erythema.   Psychiatric: He has a normal mood and affect. His behavior is normal. Judgment and thought content normal.   Vitals reviewed.    Results Review:  I have reviewed the labs, radiology results, and diagnostic studies.    Laboratory Data:     Results from last 7 days  Lab Units 08/01/18  0355 07/31/18  0457 07/28/18  1343   WBC 10*3/mm3 5.09 5.83 6.44   HEMOGLOBIN g/dL 14.2 14.2 15.1   HEMATOCRIT % 41.5 42.7 44.2   PLATELETS 10*3/mm3 116* 123* 148       Results from last 7 days  Lab Units 08/01/18  0631 07/31/18  0457 07/30/18  0424  07/28/18  1343   SODIUM mmol/L 139 138 140  < > 140   POTASSIUM mmol/L 4.7 4.6 4.7  < > 4.7   CHLORIDE mmol/L 103 101 102  < > 103   CO2 mmol/L 22.0* 24.0 25.0  < > 22.0*   BUN mg/dL 18 21 23*  < > 22*   CREATININE mg/dL 0.95 1.07 1.12  < > 1.37   CALCIUM mg/dL 9.7 9.6 10.0  < > 10.0   BILIRUBIN mg/dL  --   --   --   --  0.7   ALK PHOS U/L  --   --   --   --  95   ALT (SGPT) U/L  --   --   --   --  35   AST (SGOT) U/L  --   --   --   --  36   GLUCOSE mg/dL 190* 196* 186*  < > 257*   < > = values in this interval not displayed.    Radiology Data:   Imaging Results (last 24 hours)     Procedure Component Value Units Date/Time    NM Hepatobiliary Without CCK [[id number]] Collected:  07/31/18 1323     Updated:  07/31/18 1522    Narrative:       EXAM: NM HEPATOBILIARY WITHOUT CCK- - 7/31/2018 10:45 AM CDT     HISTORY: RUQ pain, cholecystitis suspected; R07.9-Chest pain,  unspecified; R10.13-Epigastric pain; R07.2-Precordial pain       COMPARISON: 7/30/2018.      TECHNIQUE:    5.3 mCi of Tc-99m Mebrofenin was administered intravenously and serial  anterior planar images over the liver were obtained. 30 mL corn oil  emulsion was administered orally with subsequent imaging per protocol.     FINDINGS:   Homogenous activity throughout the liver with good clearance of  background activity. This indicates good hepatocellular function.      Biliary tree activity is seen at 10  minutes. The gallbladder is seen at  15 minutes. Bowel activity is seen at 25 minutes.     The computed gallbladder ejection fraction is 2 percent. (normal  >20  percent).           Impression:       1. Low gallbladder ejection fraction, which can be due to chronic  cholecystitis.     This examination was initially reviewed and dictated by Dr. Mari Melton.     Exam additionally reviewed by Dr. Merritt Reaves. Agree with the stated  findings and impression.  This report was finalized on 07/31/2018 15:19 by Dr Merritt Reaves, .        I have reviewed the patient current medications.     Assessment/Plan   Assessment:  1.  Chest pain-positive dobutamine stress echo  2.  Biliary dyskinesia with gallbladder EF 2% on HIDA scan  3.  Gastroesophageal reflux disease without esophagitis  4.  Type II Diabetes Mellitus-insulin dependent A1C 7.2%  5.  Obesity BMI 32  6.  Obstructive sleep apnea  7.  Mild acute kidney injury- improved  8.  Nausea/vomiting   9.  Irritable bowel syndrome  10.  Thrombocytopenia- mild    Plan:  1.  Plan for laparoscopic cholecystectomy with Dr. Ann this afternoon  2.  Appreciate cardiology assistance. No plans for heart cath at this time as his symptoms are felt to be related more to biliary dyskinesia. Plans for outpatient follow-up and close monitoring per Dr. Ramey  3.  Last blood glucoses 190, 198. Continue current regimen.   4.  Labs in AM- CBC, BMP  5.  Plan for potential discharge home tomorow    Discharge Planning: I expect the patient to be discharged to home in 1 day.    TEENA Martinez   08/01/18   9:32 AM     Chart reviewed.  Patient examined.   Agree with assessment and plan.  Gabby Fam,   08/01/18  3:14 PM

## 2018-08-01 NOTE — PROGRESS NOTES
LOS: 0 days   Patient Care Team:  GINA Felder as PCP - General (Physician Assistant)  GINA Felder as PCP - Family Medicine  Wade Ramey MD as PCP - Claims Attributed  Wade Ramey MD as Cardiologist (Cardiology)  Sumanth Badillo MD as Consulting Physician (Urology)  Cyril Blake MD as Referring Physician (Otolaryngology)  Mayank Ibarra MD as Consulting Physician (Otolaryngology)  Hamilton White MD as Consulting Physician (Neurology)    Chief Complaint: Principal Problem:    Epigastric pain  Active Problems:    Precordial pain    Chest pain    Shortness of breath    Subjective  Feeling  better  No chest pain at rest or with exertion but more with eating with associated nausea  No excessive shortness of breath  No new complaints  Complains of epigastric tenderness  Interval History: Improved overall    Patient Complaints:   Chief Complaint   Patient presents with   • Chest Pain       Telemetry: no malignant arrhythmia. No significant pauses.    Review of Systems   Constitutional: No chills   Has fatigue   No fever.   HENT: Negative.    Eyes: Negative.    Respiratory: Negative for cough,   No chest wall soreness,   Shortness of breath,   no wheezing, no stridor.    Cardiovascular: Negative for chest pain,   No palpitations   No significant  leg swelling.   Gastrointestinal: Negative for abdominal distention,  Positive for abdominal pain,   No blood in stool,   No constipation,   No diarrhea,   No nausea   No vomiting.   Endocrine: Negative.    Genitourinary: Negative for difficulty urinating, dysuria, flank pain and hematuria.   Musculoskeletal: Negative.    Skin: Negative for rash and wound.   Allergic/Immunologic: Negative.    Neurological: Negative for dizziness, syncope, weakness,   No light-headedness  No  headaches.   Hematological: Does not bruise/bleed easily.   Psychiatric/Behavioral: Negative for agitation or behavioral problems,   No confusion,   the patient is   nervous/anxious.       History:   Past Medical History:   Diagnosis Date   • Arthritis    • Asthma    • Carotid disease, bilateral (CMS/HCC)    • Chest pain    • Chronic ischemic heart disease    • Chronic sinusitis    • Kathia bullosa    • Coronary artery disease    • Deviated septum    • Diabetes mellitus (CMS/HCC)    • Difficulty urinating    • Diverticulitis    • Enlarged prostate    • Fatty liver    • GERD (gastroesophageal reflux disease)    • Hyperlipidemia    • Hypertension    • Hypertrophy of nasal turbinates    • Keratoderma    • Kidney stone    • Migraine    • Murmur, heart    • Myocardial infarction    • Obesity    • PONV (postoperative nausea and vomiting)    • Psoriasis    • Seizures (CMS/HCC)    • Sinus congestion    • Sleep apnea    • SOB (shortness of breath)    • Stroke (CMS/HCC)      Past Surgical History:   Procedure Laterality Date   • CARDIAC CATHETERIZATION  01/2016    Dr. Broadbent; widely patent previously placed stents in the left anterior descending and obstructive disease involving the diagonal branch which was treated medically   • CARDIAC CATHETERIZATION N/A 7/14/2017    Procedure: Left Heart Cath;  Surgeon: Wade Ramey MD;  Location: [facility] CATH INVASIVE LOCATION;  Service:    • CORONARY ANGIOPLASTY     • CORONARY STENT PLACEMENT      x 6   • ENDOSCOPIC FUNCTIONAL SINUS SURGERY (FESS) Bilateral 12/13/2017    Procedure: PROCEDURE PERFORMED:  Bilateral functional endoscopic anterior ethmoidectomy with bilateral middle meatal antrostomy Septoplasty Right kathia bullosa resection Bilateral inferior turbinate reduction via Coblation;  Surgeon: Mayank Ibarra MD;  Location: [facility] OR;  Service:    • ENDOSCOPY N/A 7/30/2018    Procedure: ESOPHAGOGASTRODUODENOSCOPY WITH ANESTHESIA;  Surgeon: Benitez Mas MD;  Location: [facility] ENDOSCOPY;  Service: Gastroenterology   • HERNIA REPAIR      x2 inguinal area   • KIDNEY STONE SURGERY     • KNEE SURGERY Right    • OTHER SURGICAL HISTORY       urolift   • PROSTATE SURGERY      Dr. Badillo - 2017   • THUMB AMPUTATION Left     partial   • TOE AMPUTATION Right     big     Social History     Social History   • Marital status:      Spouse name: N/A   • Number of children: N/A   • Years of education: N/A     Occupational History   • Not on file.     Social History Main Topics   • Smoking status: Former Smoker     Years: 4.50     Types: Cigarettes     Quit date: 1993   • Smokeless tobacco: Former User     Types: Chew     Quit date: 2009   • Alcohol use No      Comment: quit 2009   • Drug use: No   • Sexual activity: Defer     Other Topics Concern   • Not on file     Social History Narrative   • No narrative on file     Family History   Problem Relation Age of Onset   • Heart disease Father    • No Known Problems Mother        Labs:  WBC WBC   Date Value Ref Range Status   07/31/2018 5.83 4.80 - 10.80 10*3/mm3 Final      HGB Hemoglobin   Date Value Ref Range Status   07/31/2018 14.2 14.0 - 18.0 g/dL Final      HCT Hematocrit   Date Value Ref Range Status   07/31/2018 42.7 40.0 - 52.0 % Final      Platelets Platelets   Date Value Ref Range Status   07/31/2018 123 (L) 130 - 400 10*3/mm3 Final      MCV MCV   Date Value Ref Range Status   07/31/2018 87.7 82.0 - 95.0 fL Final        Results from last 7 days  Lab Units 07/31/18  0457 07/30/18  0424 07/29/18  0356 07/28/18  1343   SODIUM mmol/L 138 140 141 140   POTASSIUM mmol/L 4.6 4.7 3.8 4.7   CHLORIDE mmol/L 101 102 101 103   CO2 mmol/L 24.0 25.0 25.0 22.0*   BUN mg/dL 21 23* 21 22*   CREATININE mg/dL 1.07 1.12 1.29 1.37   CALCIUM mg/dL 9.6 10.0 9.7 10.0   BILIRUBIN mg/dL  --   --   --  0.7   ALK PHOS U/L  --   --   --  95   ALT (SGPT) U/L  --   --   --  35   AST (SGOT) U/L  --   --   --  36   GLUCOSE mg/dL 196* 186* 188* 257*     Lab Results   Component Value Date    CKTOTAL 92 01/17/2017    CKMB 1.37 01/17/2017    TROPONINI <0.012 07/28/2018     PT/INR:  No results found for: PROTIME/No results found for:  INR    Imaging Results (last 72 hours)     Procedure Component Value Units Date/Time    NM Hepatobiliary Without CCK [[id number]] Collected:  07/31/18 1323     Updated:  07/31/18 1522    Narrative:       EXAM: NM HEPATOBILIARY WITHOUT CCK- - 7/31/2018 10:45 AM CDT     HISTORY: RUQ pain, cholecystitis suspected; R07.9-Chest pain,  unspecified; R10.13-Epigastric pain; R07.2-Precordial pain       COMPARISON: 7/30/2018.      TECHNIQUE:    5.3 mCi of Tc-99m Mebrofenin was administered intravenously and serial  anterior planar images over the liver were obtained. 30 mL corn oil  emulsion was administered orally with subsequent imaging per protocol.     FINDINGS:   Homogenous activity throughout the liver with good clearance of  background activity. This indicates good hepatocellular function.      Biliary tree activity is seen at 10 minutes. The gallbladder is seen at  15 minutes. Bowel activity is seen at 25 minutes.     The computed gallbladder ejection fraction is 2 percent. (normal  >20  percent).           Impression:       1. Low gallbladder ejection fraction, which can be due to chronic  cholecystitis.     This examination was initially reviewed and dictated by Dr. Mari Melton.     Exam additionally reviewed by Dr. Merritt Reaves. Agree with the stated  findings and impression.  This report was finalized on 07/31/2018 15:19 by Dr Merritt Reaves, .    US Gallbladder [[id number]] Collected:  07/30/18 1419     Updated:  07/30/18 1423    Narrative:       EXAMINATION:   US GALLBLADDER-  7/30/2018 2:19 PM CDT     HISTORY: ULTRASOUND GALLBLADDER 7/30/2018 1:37 PM CDT     REASON FOR EXAM: nausea, epi pain; R07.9-Chest pain, unspecified;  R10.13-Epigastric pain       COMPARISON: NONE      TECHNIQUE: Multiple longitudinal and transverse realtime sonographic  images of the right upper quadrant of the abdomen are obtained.      FINDINGS:    Pancreas: Normal in size and echogenicity.      Liver: Liver is mildly echogenic. This  represents hepatic steatosis..      Gallbladder: No intraluminal echoes, wall thickening, or pericholecystic  fluid.      Bile ducts: The CBD measures 0.4 cm in diameter. There is no  intrahepatic or extrahepatic ductal dilation.      Right kidney: Normal in size, shape and contour. No mass, hydronephrosis  or calculi. No perinephric fluid collection.       Other: The visualized abdominal aorta is normal in caliber.        Impression:       Mildly echogenic liver representing mild hepatic steatosis        This report was finalized on 07/30/2018 14:20 by Dr. Maco Akbar MD.          Objective     Allergies   Allergen Reactions   • Flagyl [Metronidazole] Hives   • Atorvastatin Other (See Comments)     Muscle cramps   • Ciprofloxacin Hives       Medication Review: Performed  Current Facility-Administered Medications   Medication Dose Route Frequency Provider Last Rate Last Dose   • aluminum-magnesium hydroxide-simethicone (MAALOX MAX) 400-400-40 MG/5ML suspension 15 mL  15 mL Oral Q6H PRN Gregor Bell MD       • aspirin chewable tablet 81 mg  81 mg Oral Daily Gregor Bell MD   81 mg at 07/30/18 1430   • bisacodyl (DULCOLAX) EC tablet 5 mg  5 mg Oral Daily PRN Gregor Bell MD       • dicyclomine (BENTYL) tablet 20 mg  20 mg Oral Q6H PRN Gregor Bell MD       • DOBUTamine (DOBUTREX) 1 mg/mL infusion (ECHO)  10-50 mcg/kg/min Intravenous Continuous Gregor Bell MD   Stopped at 07/29/18 1138   • enoxaparin (LOVENOX) syringe 30 mg  30 mg Subcutaneous Once Shane Ann MD       • fluticasone (FLONASE) 50 MCG/ACT nasal spray 2 spray  2 spray Nasal Daily Gregor Bell MD   2 spray at 07/31/18 0905   • insulin detemir (LEVEMIR) injection 60 Units  60 Units Subcutaneous Daily Gregor Bell MD   60 Units at 07/30/18 0849   • insulin detemir (LEVEMIR) injection 72 Units  72 Units Subcutaneous Nightly Gregor Bell MD   72 Units at  07/31/18 2124   • insulin lispro (humaLOG) injection 25 Units  25 Units Subcutaneous TID With Meals Gregor Bell MD   25 Units at 07/30/18 1807   • ipratropium-albuterol (DUO-NEB) nebulizer solution 3 mL  3 mL Nebulization Q4H PRN Gabby Fam DO       • isosorbide dinitrate (ISORDIL) tablet 20 mg  20 mg Oral BID - Nitrates Gregor Bell MD   20 mg at 07/31/18 1728   • lamoTRIgine (LaMICtal) tablet 100 mg  100 mg Oral BID Gregor Bell MD   100 mg at 07/31/18 2120   • levETIRAcetam (KEPPRA) tablet 1,500 mg  1,500 mg Oral Daily Gregor eBll MD   1,500 mg at 07/30/18 1430   • levETIRAcetam (KEPPRA) tablet 2,000 mg  2,000 mg Oral Nightly Gregor Bell MD   2,000 mg at 07/31/18 2120   • Liraglutide (VICTOZA) injection 1.2 mg  1.2 mg Subcutaneous Nightly Gregor Bell MD   1.2 mg at 07/31/18 2121   • lisinopril (PRINIVIL,ZESTRIL) tablet 20 mg  20 mg Oral Nightly Gregor Bell MD   20 mg at 07/31/18 2120   • magnesium hydroxide (MILK OF MAGNESIA) suspension 2400 mg/10mL 10 mL  10 mL Oral Daily PRN Gregor Bell MD       • metoprolol tartrate (LOPRESSOR) tablet 50 mg  50 mg Oral Q12H Gregor Bell MD   50 mg at 07/31/18 2120   • Morphine sulfate (PF) injection 4 mg  4 mg Intravenous Q4H PRN Gregor Bell MD        And   • naloxone (NARCAN) injection 0.4 mg  0.4 mg Intravenous Q5 Min PRN Gregor eBll MD       • nitroglycerin (NITROSTAT) SL tablet 0.4 mg  0.4 mg Sublingual Q5 Min PRN TEENA Freeman   0.4 mg at 07/29/18 1152   • ondansetron (ZOFRAN) injection 4 mg  4 mg Intravenous Q6H PRN Gregor Bell MD       • pantoprazole (PROTONIX) EC tablet 40 mg  40 mg Oral BID AC Gregor Bell MD   40 mg at 07/31/18 1728   • polyvinyl alcohol (LIQUIFILM) 1.4 % ophthalmic solution 2 drop  2 drop Both Eyes Q3H PRN Gregor Bell MD       • rosuvastatin (CRESTOR)  "tablet 20 mg  20 mg Oral Nightly Gregor Bell MD   20 mg at 07/31/18 2120   • sodium chloride 0.9 % flush 1-10 mL  1-10 mL Intravenous PRN Gregor Bell MD       • sodium chloride 0.9 % infusion 500 mL  500 mL Intravenous Continuous PRN Hamilton Nuñez CRNA   Stopped at 07/30/18 1338   • sodium chloride 0.9 % infusion  100 mL/hr Intravenous Continuous Wade Ramey  mL/hr at 07/31/18 0644 100 mL/hr at 07/31/18 0644   • sucralfate (CARAFATE) tablet 1 g  1 g Oral 4x Daily AC & at Bedtime Benitez Mas MD   1 g at 07/31/18 2120   • triamterene-hydrochlorothiazide (MAXZIDE) 75-50 MG per tablet 0.5 tablet  0.5 tablet Oral Daily Gregor Bell MD   0.5 tablet at 07/31/18 0905       Vital Sign Min/Max for last 24 hours  Temp  Min: 97 °F (36.1 °C)  Max: 98.6 °F (37 °C)   BP  Min: 99/56  Max: 141/88   Pulse  Min: 69  Max: 79   Resp  Min: 16  Max: 20   SpO2  Min: 95 %  Max: 99 %   No Data Recorded   Weight  Min: 110 kg (242 lb 9.6 oz)  Max: 110 kg (243 lb)     Flowsheet Rows      First Filed Value   Admission Height  182.9 cm (72\") Documented at 07/28/2018 1339   Admission Weight  107 kg (235 lb 12.8 oz) Documented at 07/28/2018 1339          Results for orders placed during the hospital encounter of 07/28/18   Adult Stress Echo W/ Cont or Stress Agent if Necessary Per Protocol    Narrative · Left ventricular systolic function is normal. Estimated EF = 55%.  · Abnormal stress echo with echocardiographic evidence for myocardial   ischemia located in the septal wall and apical wall.          Physical Exam:    General Appearance: Awake, alert, in no acute distress  Eyes: Pupils equal and reactive    Ears: Appear intact with no abnormalities noted  Nose: Nares normal, no drainage  Neck: supple, trachea midline, no carotid bruit and no JVD  Back: no kyphosis present,    Lungs: respirations regular, respirations even and respirations unlabored  Heart: normal S1, S2,  2/6 pansystolic " murmur in the left sternal border,  no rub and no click  Abdomen: normal bowel sounds, no masses, no hepatomegaly, no splenomegaly, guarding and no rebound tenderness   Skin: no bleeding, bruising or rash  Extremities: no cyanosis  Psychiatric/Behavioral: Negative for agitation, behavioral problems, confusion, the patient does  appear to be nervous/anxious.          Results Review:   I reviewed the patient's new clinical results.  I reviewed the patient's new imaging results and agree with the interpretation.  I reviewed the patient's other test results and agree with the interpretation  I personally viewed and interpreted the patient's EKG/Telemetry data  Discussed with patient,    Reviewed available prior notes, consults, prior visits, laboratory findings, radiology and cardiology relevant reports. Updated chart as applicable. I have reviewed the patient's medical history in detail and updated the computerized patient record as relevant.    Updated patient regarding any new or relevant abnormalities on review of records or any new findings on physical exam. Mentioned to patient about purpose of visit and desirable health short and long term goals and objectives.     Assessment/Plan     Principal Problem:    Epigastric pain  Active Problems:    Precordial pain    Chest pain      Plan    Patient now has more of epigastric and right upper quadrant pain worse with eating at times worse with exertion  This favors a GI origin despite him having underlying coronary artery disease and positive stress test with stress induced apical ischemia over a small distribution  Discussed with GI mid-level provider who is seeing the patient and recommend further evaluation with current plans for upper GI endoscopy  Subsequently this was done later in the day and was negative and so was ultrasound of the gallbladder, per my discussion with GI mid-level provider I would recommend a HIDA scan  This will be performed tomorrow morning if  negative consider cardiac cath if positive consider GI origin of pain  Maximize medical therapy  Supportive care  Telemetry  Optimal medical therapy  Deep vein thrombosis prophylaxis/precautions  Appropriate diet, fluid, sodium, caffeine, stimulants intake   Compliance to diet and medications   Avoid NSAIDS    Wade Ramey MD  07/31/18  10:08 PM    EMR Dragon/Transcription was used to dictate part of this note

## 2018-08-01 NOTE — PROGRESS NOTES
[facility]  Inpatient Progress Note     TODAY'S DATE: 08/01/18  Carlos A Boyer Jr.  1960      Reason for Follow Up:  gabrielle pain, gerd     Subjective: still with abdominal pain, no n/v. No fever. Npo for niyah.       Allergies   Allergen Reactions   • Flagyl [Metronidazole] Hives   • Atorvastatin Other (See Comments)     Muscle cramps   • Ciprofloxacin Hives       Current Facility-Administered Medications:   •  aluminum-magnesium hydroxide-simethicone (MAALOX MAX) 400-400-40 MG/5ML suspension 15 mL, 15 mL, Oral, Q6H PRN, Gregor Bell MD  •  aspirin chewable tablet 81 mg, 81 mg, Oral, Daily, Gregor Bell MD, 81 mg at 07/30/18 1430  •  bisacodyl (DULCOLAX) EC tablet 5 mg, 5 mg, Oral, Daily PRN, Gregor Bell MD  •  ceFOXITin (MEFOXIN) 2 g/11.1ml IV PUSH syringe, 2 g, Intravenous, Q6H, Shane Ann MD  •  dicyclomine (BENTYL) tablet 20 mg, 20 mg, Oral, Q6H PRN, Gregor Bell MD  •  DOBUTamine (DOBUTREX) 1 mg/mL infusion (ECHO), 10-50 mcg/kg/min, Intravenous, Continuous, Gregor Bell MD, Stopped at 07/29/18 1138  •  fluticasone (FLONASE) 50 MCG/ACT nasal spray 2 spray, 2 spray, Nasal, Daily, Gregor Bell MD, 2 spray at 07/31/18 0905  •  insulin detemir (LEVEMIR) injection 60 Units, 60 Units, Subcutaneous, Daily, Gregor Bell MD, 60 Units at 07/30/18 0849  •  insulin detemir (LEVEMIR) injection 72 Units, 72 Units, Subcutaneous, Nightly, Gregor Bell MD, 72 Units at 07/31/18 2124  •  insulin lispro (humaLOG) injection 25 Units, 25 Units, Subcutaneous, TID With Meals, Gregor Bell MD, 25 Units at 07/30/18 1807  •  ipratropium-albuterol (DUO-NEB) nebulizer solution 3 mL, 3 mL, Nebulization, Q4H PRN, Gabby Fam DO  •  isosorbide dinitrate (ISORDIL) tablet 20 mg, 20 mg, Oral, BID - Nitrates, Gregor Bell MD, 20 mg at 07/31/18 1728  •  lamoTRIgine (LaMICtal) tablet 100  mg, 100 mg, Oral, BID, Gregor Bell MD, 100 mg at 07/31/18 2120  •  levETIRAcetam (KEPPRA) tablet 1,500 mg, 1,500 mg, Oral, Daily, Gregor Bell MD, 1,500 mg at 07/30/18 1430  •  levETIRAcetam (KEPPRA) tablet 2,000 mg, 2,000 mg, Oral, Nightly, Gregor Bell MD, 2,000 mg at 07/31/18 2120  •  Liraglutide (VICTOZA) injection 1.2 mg, 1.2 mg, Subcutaneous, Nightly, Gregor Bell MD, 1.2 mg at 07/31/18 2121  •  lisinopril (PRINIVIL,ZESTRIL) tablet 20 mg, 20 mg, Oral, Nightly, Gregor Bell MD, 20 mg at 07/31/18 2120  •  magnesium hydroxide (MILK OF MAGNESIA) suspension 2400 mg/10mL 10 mL, 10 mL, Oral, Daily PRN, Gregor Bell MD  •  metoprolol tartrate (LOPRESSOR) tablet 50 mg, 50 mg, Oral, Q12H, Gregor Bell MD, 50 mg at 08/01/18 0833  •  Morphine sulfate (PF) injection 4 mg, 4 mg, Intravenous, Q4H PRN **AND** naloxone (NARCAN) injection 0.4 mg, 0.4 mg, Intravenous, Q5 Min PRN, Gregor Bell MD  •  nitroglycerin (NITROSTAT) SL tablet 0.4 mg, 0.4 mg, Sublingual, Q5 Min PRN, Celia De La Torre, APRN, 0.4 mg at 07/29/18 1152  •  ondansetron (ZOFRAN) injection 4 mg, 4 mg, Intravenous, Q6H PRN, Gregor Bell MD  •  pantoprazole (PROTONIX) EC tablet 40 mg, 40 mg, Oral, BID AC, Gregor Bell MD, 40 mg at 07/31/18 1728  •  polyvinyl alcohol (LIQUIFILM) 1.4 % ophthalmic solution 2 drop, 2 drop, Both Eyes, Q3H PRN, Gregor Bell MD  •  rosuvastatin (CRESTOR) tablet 20 mg, 20 mg, Oral, Nightly, Gregor Bell MD, 20 mg at 07/31/18 2120  •  sodium chloride 0.9 % flush 1-10 mL, 1-10 mL, Intravenous, PRN, Gregor Bell MD  •  sodium chloride 0.9 % infusion 500 mL, 500 mL, Intravenous, Continuous PRN, Hamilton Nuñez CRNA, Stopped at 07/30/18 1338  •  sodium chloride 0.9 % infusion, 100 mL/hr, Intravenous, Continuous, Wade Ramey MD, Last Rate: 100 mL/hr at 07/31/18 0644, 100 mL/hr at  07/31/18 0644  •  sucralfate (CARAFATE) tablet 1 g, 1 g, Oral, 4x Daily AC & at Bedtime, Benitez Mas MD, 1 g at 07/31/18 2120  •  triamterene-hydrochlorothiazide (MAXZIDE) 75-50 MG per tablet 0.5 tablet, 0.5 tablet, Oral, Daily, Gregor Bell MD, 0.5 tablet at 07/31/18 0905    Review of Systems   Constitutional: Negative for chills and fever.   Respiratory: Negative for cough, shortness of breath and wheezing.    Cardiovascular: Negative for chest pain and palpitations.   Gastrointestinal: Positive for abdominal pain. Negative for abdominal distention, anal bleeding, blood in stool, constipation, diarrhea, nausea, rectal pain and vomiting.       Objective     Vital Signs  Temp:  [97.2 °F (36.2 °C)-98.6 °F (37 °C)] 97.5 °F (36.4 °C)  Heart Rate:  [69-83] 82  Resp:  [17-20] 18  BP: ()/(54-88) 115/88  Body mass index is 32.9 kg/m².    Intake/Output Summary (Last 24 hours) at 08/01/18 1146  Last data filed at 08/01/18 0903   Gross per 24 hour   Intake              740 ml   Output                0 ml   Net              740 ml     No intake/output data recorded.       PHYSICAL EXAM  Physical Exam   Constitutional: He appears well-developed and well-nourished. No distress.   Cardiovascular: Normal rate, regular rhythm and normal heart sounds.    Pulmonary/Chest: Effort normal and breath sounds normal.   Abdominal: Soft. Bowel sounds are normal. He exhibits no distension and no mass. There is no tenderness. There is no rebound and no guarding.   Musculoskeletal: He exhibits no edema.   Neurological: He is alert.   Skin: Skin is warm and dry.   Vitals reviewed.          Results Review:   I have reviewed all of the patient's current test results      Results from last 7 days  Lab Units 08/01/18  0355 07/31/18  0457 07/28/18  1343   WBC 10*3/mm3 5.09 5.83 6.44   HEMOGLOBIN g/dL 14.2 14.2 15.1   HEMATOCRIT % 41.5 42.7 44.2   PLATELETS 10*3/mm3 116* 123* 148         Results from last 7 days  Lab Units  08/01/18  0631 07/31/18  0457 07/30/18  0424  07/28/18  1343   SODIUM mmol/L 139 138 140  < > 140   POTASSIUM mmol/L 4.7 4.6 4.7  < > 4.7   CHLORIDE mmol/L 103 101 102  < > 103   CO2 mmol/L 22.0* 24.0 25.0  < > 22.0*   BUN mg/dL 18 21 23*  < > 22*   CREATININE mg/dL 0.95 1.07 1.12  < > 1.37   CALCIUM mg/dL 9.7 9.6 10.0  < > 10.0   BILIRUBIN mg/dL  --   --   --   --  0.7   ALK PHOS U/L  --   --   --   --  95   ALT (SGPT) U/L  --   --   --   --  35   AST (SGOT) U/L  --   --   --   --  36   GLUCOSE mg/dL 190* 196* 186*  < > 257*   < > = values in this interval not displayed.      Results from last 7 days  Lab Units 07/28/18  1343   INR  0.89*         Lab Results  Lab Value Date/Time   LIPASE 92 07/28/2018 1343   LIPASE 71 07/21/2018 1958   LIPASE 30 03/15/2018 1505   LIPASE 51 09/02/2017 1504   LIPASE 46 07/13/2017 1832   LIPASE 38 01/17/2017 1003   LIPASE 38 10/16/2016 0052   LIPASE 26 11/04/2015 0850   LIPASE 35 08/15/2015 1015   LIPASE 27 12/16/2014 0718   LIPASE 35 12/02/2014 1710   LIPASE 32 10/19/2014 1850       Radiology Review:  Imaging Results (last 24 hours)     Procedure Component Value Units Date/Time    NM Hepatobiliary Without CCK [[id number]] Collected:  07/31/18 1323     Updated:  07/31/18 1522    Narrative:       EXAM: NM HEPATOBILIARY WITHOUT CCK- - 7/31/2018 10:45 AM CDT     HISTORY: RUQ pain, cholecystitis suspected; R07.9-Chest pain,  unspecified; R10.13-Epigastric pain; R07.2-Precordial pain       COMPARISON: 7/30/2018.      TECHNIQUE:    5.3 mCi of Tc-99m Mebrofenin was administered intravenously and serial  anterior planar images over the liver were obtained. 30 mL corn oil  emulsion was administered orally with subsequent imaging per protocol.     FINDINGS:   Homogenous activity throughout the liver with good clearance of  background activity. This indicates good hepatocellular function.      Biliary tree activity is seen at 10 minutes. The gallbladder is seen at  15 minutes. Bowel activity is  seen at 25 minutes.     The computed gallbladder ejection fraction is 2 percent. (normal  >20  percent).           Impression:       1. Low gallbladder ejection fraction, which can be due to chronic  cholecystitis.     This examination was initially reviewed and dictated by Dr. Mari Melton.     Exam additionally reviewed by Dr. Merritt Reaves. Agree with the stated  findings and impression.  This report was finalized on 07/31/2018 15:19 by Dr Merritt Reaves, .            Assessment/Plan     Patient Active Problem List   Diagnosis Code   • Hypotension I95.9   • Diarrhea due to drug K52.1   • Syncope and collapse R55   • Type 2 diabetes mellitus with complication (CMS/[facility]) E11.8   • Gastroesophageal reflux disease without esophagitis K21.9   • Essential hypertension I10   • Seizure disorder (CMS/[facility]) G40.909   • Carotid disease, bilateral (CMS/[facility]) I77.9   • Coronary artery disease involving native coronary artery of native heart with angina pectoris (CMS/[facility]) I25.119   • Chest pain, unspecified R07.9   • Mixed hyperlipidemia E78.2   • Chronic left arterial ischemic stroke, ICA (internal carotid artery) I69.30   • HOA on CPAP G47.33, Z99.89   • Chest pain R07.9   • Benign non-nodular prostatic hyperplasia with lower urinary tract symptoms N40.1   • Deviated nasal septum J34.2   • Maribell bullosa J34.89   • Hypertrophy of both inferior nasal turbinates J34.3   • Chronic sinusitis of both maxillary sinuses J32.0   • S/P FESS (functional endoscopic sinus surgery) Z98.890   • Diabetic complication (CMS/[facility]) E11.8   • Epigastric pain R10.13   • Precordial pain R07.2          1. Ness pain  2. gerd  3. Chest pain, hx cad with cardiac stents 7/2017  4. Abnormal stress test    5. Biliary dyskinesia    For cholecystectomy later today.  With the findings of biliary dyskinesia I will go ahead and stop his Carafate.  I did discuss with the patient that I feel he probably needs to continue with PPI therapy on discharge for  treatment of his GERD.  We talked that he still will need to continue with a healthy lifestyle with reflux precautions and a healthy diet on discharge even with his gallbladder gone.  He expressed understanding and agrees.      I will sign off at this time.      EMR Dragon/transcription disclaimer:  Much of this encounter note is electronic transcription/translation of spoken language to printed text.  The electronic translation of spoken language may be erroneous, or at times, nonsensical words or phrases may be inadvertently transcribed.  Although I have reviewed the note for such errors, some may still exist.      Malathi DICKINSON  10:43 AM    Benitez Mas MD  08/01/18  11:46 AM

## 2018-08-01 NOTE — OP NOTE
CHOLECYSTECTOMY LAPAROSCOPIC INTRAOPERATIVE CHOLANGIOGRAM  Procedure Note    Carlos A Boyer Jr.  7/28/2018 - 8/1/2018    Pre-op Diagnosis:   BILIARY DYSKINESIA    Post-op Diagnosis:     Chronic cholecystitis    Procedure/CPT® Codes:      Procedure(s):  CHOLECYSTECTOMY LAPAROSCOPIC INTRAOPERATIVE CHOLANGIOGRAM    Surgeon(s):  Shane Ann MD    Anesthesia: General    Staff:   Circulator: Brandi Dumas RN; Darien Musa RN  Scrub Person: Joaquina Shaw; Akhil Millan  Assistant: Deandre Garcia    Estimated Blood Loss: 25 mL    Specimens:                ID Type Source Tests Collected by Time   A : GALLBLADDER AND CONTENTS Tissue Gallbladder TISSUE PATHOLOGY EXAM Shane Ann MD 8/1/2018 1420         Drains:  There were no drains    Indications: Patient is a moderately obese 58-year-old gentleman who is cared for here and also at the [facility] who was admitted 3 days ago with fullness, nausea, belching, diaphoresis, with radiation to his left shoulder, is started after eating at noon he used 3 nitroglycerin as well as baby aspirin his chest discomfort continued and stated that he [place] in the mall when it started.  He has a history of prior stents also a prior myocardial infarction in 2011 with stents placed in [facility].  He has had cardiac catheterization in 2016 as well as 17 showing the stents were open with a ejection fraction of 75% with a left ventricular end-diastolic pressure of 17.  He was admitted his d-dimer and also enzymes were negative he had a stress echocardiogram on the 29th that was negative, he had a upper endoscopy with minimal findings today and subsequently had a ultrasound showing no stones but a HIDA scan showing reduced ejection fraction of 2% and with some exacerbation of his discomfort with the CCK ingestion.  It is presumed that he has biliary dyskinesia as a source of this pain he is aware the procedure the risk and benefits and gives  his informed consent for laparoscopic cholecystectomy to be completed on August 1.  Risk and benefits discussed he gives his informed consent for surgery.     Findings: Laparoscopic exploration, cholecystectomy and cholangiogram completed cholangiogram was normal blood loss was less than 25 mL complications none.    Complications: There were no complications    Procedure:Patient was placed in a supine position in the surgical suite and after a timeout had been completed  the area was scrubbed prepped and draped with alcohol and Betadine a Ioban was applied.  Following this a transverse skin incision was completed in the upper abdomen incising through the skin and subcutaneous taste tissue to the fascia.  Once of the fascia 2-0 Vicryl stay sutures were placed superior and inferior to the planned transverse incision.  An incision was completed with a 15 blade incising through the fascia and out muscle-splitting technique was completed using Yanni clamps and dissecting in a muscle-splitting technique down to the peritoneum.  The peritoneum was opened and entered a blunt trocar 10 mm port was placed in this area and insufflation with CO2 was completed to maintain the abdominal pressure between 10 and 14 mm of pressure.  This accomplished under direct visualization 35 mm trochars were placed one in the right mid abdomen and 2 in the upper abdomen.  With the 3, 5 mm ports and one 10 the gallbladders and grasped with the right lateral port and elevated toward the right shoulder.  The adhesions at the reji hepatis was taken down sharply and as well as with Bovie electrocautery and dissection was completed at the reji hepatis.  With dissection completed the critical angle was visualized with the cystic artery and cystic duct with this visualized the cystic artery was then clipped ×3 proximally and incised distally further dissection around the infundibulum was completed and a clip was placed across the infundibulum  incision into the infundibulum was completed through which a cholangiogram was brought into the field and cholangiography obtained.  There is free flow into the left and right hepatic radical, hepatic duct, bile duct and free flow into the duodenum.  With this completed no other abnormalities noted the Cholangiocath was removed the cystic duct was then transected the infundibulum was controlled using 1-0 PDS loop and the cystic duct was controlled using 2-0 PDS loops and one clip distal to this.  With this completed the gallbladder was slowly and tediously removed from the infrahepatic substance prominent venous and arterial tributaries were noted and a single clip was applied across these.  Ultimately the gallbladder was able to be fully removed.  With this completed the bed was evaluated there is no bleeding from the bed and full control of the cystic duct and cystic artery the gallbladder was then grasped and placed in the Endo Catch  and removed from the right midepigastric port.  There is no spillage of bile or stones and this removal and having completed this the excess gas was relieved the right midepigastric port was closed using 4 figure-of-eight sutures of 0 Vicryl the deep dermis was reapproximated using simple inverted interrupted sutures of 3-0 Vicryl and skin was enclosed using running subcuticular suture of 4-0 Vicryl.  The skin was injected with half percent Marcaine with epinephrine a total of 30 mL used the 5 mm trochars sites were then closed using simple inverted interrupted sutures of 4-0 Vicryl and once completed the area was cleaned with saline Mastisol Steri-Strips 2 x 2 and Tegaderm applied.  Patient was then extubated and transferred to the recovery room in good condition and we supplied back pressure over the right midepigastric port until the patient was extubated to reduce any increased tension on the larger trocar site.  Shane Ann MD     Date: 8/1/2018  Time: 3:02 PM    EMR  Dragon/Transcription disclaimer: Much of this encounter note is an electronic transcription/translation of spoken language to printed text. The electronic translation of spoken language may permit erroneous, or at times, nonsensical words or phrases to be inadvertently transcribed; although I have reviewed the note for such errors, some may still exist.

## 2018-08-01 NOTE — PROGRESS NOTES
LOS: 0 days   Patient Care Team:  GINA Felder as PCP - General (Physician Assistant)  GINA Felder as PCP - Family Medicine  Wade Ramey MD as PCP - Claims Attributed  Wade Ramey MD as Cardiologist (Cardiology)  Sumanth Badillo MD as Consulting Physician (Urology)  Cyril Blake MD as Referring Physician (Otolaryngology)  Mayank Ibarra MD as Consulting Physician (Otolaryngology)  Hamilton White MD as Consulting Physician (Neurology)    Chief Complaint: Principal Problem:    Epigastric pain  Active Problems:    Precordial pain    Chest pain    Shortness of breath    Subjective  Feeling  the same  No chest pain at rest or with exertion but more with eating with associated nausea  Postprandial abdominal fullness with epigastric and right upper quadrant discomfort now  No excessive shortness of breath  No new complaints  Complains of epigastric tenderness  Interval History: Improved overall    Patient Complaints:   Chief Complaint   Patient presents with   • Chest Pain       Telemetry: no malignant arrhythmia. No significant pauses.    Review of Systems   Constitutional: No chills   Has fatigue   No fever.   HENT: Negative.    Eyes: Negative.    Respiratory: Negative for cough,   No chest wall soreness,   Shortness of breath,   no wheezing, no stridor.    Cardiovascular: Negative for chest pain,   No palpitations   No significant  leg swelling.   Gastrointestinal: mild abdominal distention,  Positive for abdominal pain,   Postprandial abdominal fullness and right upper quadrant and epigastric discomfort  No blood in stool,   No constipation,   No diarrhea,    Endocrine: Negative.    Genitourinary: Negative for difficulty urinating, dysuria, flank pain and hematuria.   Musculoskeletal: Negative.    Skin: Negative for rash and wound.   Allergic/Immunologic: Negative.    Neurological: Negative for dizziness, syncope, weakness,   No light-headedness  No  headaches.    Hematological: Does not bruise/bleed easily.   Psychiatric/Behavioral: Negative for agitation or behavioral problems,   No confusion,   the patient is  nervous/anxious.       History:   Past Medical History:   Diagnosis Date   • Arthritis    • Asthma    • Carotid disease, bilateral (CMS/HCC)    • Chest pain    • Chronic ischemic heart disease    • Chronic sinusitis    • Kathia bullosa    • Coronary artery disease    • Deviated septum    • Diabetes mellitus (CMS/HCC)    • Difficulty urinating    • Diverticulitis    • Enlarged prostate    • Fatty liver    • GERD (gastroesophageal reflux disease)    • Hyperlipidemia    • Hypertension    • Hypertrophy of nasal turbinates    • Keratoderma    • Kidney stone    • Migraine    • Murmur, heart    • Myocardial infarction    • Obesity    • PONV (postoperative nausea and vomiting)    • Psoriasis    • Seizures (CMS/HCC)    • Sinus congestion    • Sleep apnea    • SOB (shortness of breath)    • Stroke (CMS/HCC)      Past Surgical History:   Procedure Laterality Date   • CARDIAC CATHETERIZATION  01/2016    Dr. Broadbent; widely patent previously placed stents in the left anterior descending and obstructive disease involving the diagonal branch which was treated medically   • CARDIAC CATHETERIZATION N/A 7/14/2017    Procedure: Left Heart Cath;  Surgeon: Wade Ramey MD;  Location: [facility] CATH INVASIVE LOCATION;  Service:    • CORONARY ANGIOPLASTY     • CORONARY STENT PLACEMENT      x 6   • ENDOSCOPIC FUNCTIONAL SINUS SURGERY (FESS) Bilateral 12/13/2017    Procedure: PROCEDURE PERFORMED:  Bilateral functional endoscopic anterior ethmoidectomy with bilateral middle meatal antrostomy Septoplasty Right kathia bullosa resection Bilateral inferior turbinate reduction via Coblation;  Surgeon: Mayank Ibarra MD;  Location: [facility] OR;  Service:    • ENDOSCOPY N/A 7/30/2018    Procedure: ESOPHAGOGASTRODUODENOSCOPY WITH ANESTHESIA;  Surgeon: Benitez Mas MD;  Location: [facility]  ENDOSCOPY;  Service: Gastroenterology   • HERNIA REPAIR      x2 inguinal area   • KIDNEY STONE SURGERY     • KNEE SURGERY Right    • OTHER SURGICAL HISTORY      urolift   • PROSTATE SURGERY      Dr. Badillo - 2017   • THUMB AMPUTATION Left     partial   • TOE AMPUTATION Right     big     Social History     Social History   • Marital status:      Spouse name: N/A   • Number of children: N/A   • Years of education: N/A     Occupational History   • Not on file.     Social History Main Topics   • Smoking status: Former Smoker     Years: 4.50     Types: Cigarettes     Quit date: 1993   • Smokeless tobacco: Former User     Types: Chew     Quit date: 2009   • Alcohol use No      Comment: quit 2009   • Drug use: No   • Sexual activity: Defer     Other Topics Concern   • Not on file     Social History Narrative   • No narrative on file     Family History   Problem Relation Age of Onset   • Heart disease Father    • No Known Problems Mother        Labs:  WBC WBC   Date Value Ref Range Status   07/31/2018 5.83 4.80 - 10.80 10*3/mm3 Final      HGB Hemoglobin   Date Value Ref Range Status   07/31/2018 14.2 14.0 - 18.0 g/dL Final      HCT Hematocrit   Date Value Ref Range Status   07/31/2018 42.7 40.0 - 52.0 % Final      Platelets Platelets   Date Value Ref Range Status   07/31/2018 123 (L) 130 - 400 10*3/mm3 Final      MCV MCV   Date Value Ref Range Status   07/31/2018 87.7 82.0 - 95.0 fL Final          Results from last 7 days  Lab Units 07/31/18  0457 07/30/18  0424 07/29/18  0356 07/28/18  1343   SODIUM mmol/L 138 140 141 140   POTASSIUM mmol/L 4.6 4.7 3.8 4.7   CHLORIDE mmol/L 101 102 101 103   CO2 mmol/L 24.0 25.0 25.0 22.0*   BUN mg/dL 21 23* 21 22*   CREATININE mg/dL 1.07 1.12 1.29 1.37   CALCIUM mg/dL 9.6 10.0 9.7 10.0   BILIRUBIN mg/dL  --   --   --  0.7   ALK PHOS U/L  --   --   --  95   ALT (SGPT) U/L  --   --   --  35   AST (SGOT) U/L  --   --   --  36   GLUCOSE mg/dL 196* 186* 188* 257*     Lab Results    Component Value Date    CKTOTAL 92 01/17/2017    CKMB 1.37 01/17/2017    TROPONINI <0.012 07/28/2018     PT/INR:  No results found for: PROTIME/No results found for: INR    Imaging Results (last 72 hours)     Procedure Component Value Units Date/Time    NM Hepatobiliary Without CCK [[id number]] Collected:  07/31/18 1323     Updated:  07/31/18 1522    Narrative:       EXAM: NM HEPATOBILIARY WITHOUT CCK- - 7/31/2018 10:45 AM CDT     HISTORY: RUQ pain, cholecystitis suspected; R07.9-Chest pain,  unspecified; R10.13-Epigastric pain; R07.2-Precordial pain       COMPARISON: 7/30/2018.      TECHNIQUE:    5.3 mCi of Tc-99m Mebrofenin was administered intravenously and serial  anterior planar images over the liver were obtained. 30 mL corn oil  emulsion was administered orally with subsequent imaging per protocol.     FINDINGS:   Homogenous activity throughout the liver with good clearance of  background activity. This indicates good hepatocellular function.      Biliary tree activity is seen at 10 minutes. The gallbladder is seen at  15 minutes. Bowel activity is seen at 25 minutes.     The computed gallbladder ejection fraction is 2 percent. (normal  >20  percent).           Impression:       1. Low gallbladder ejection fraction, which can be due to chronic  cholecystitis.     This examination was initially reviewed and dictated by Dr. Mari Melton.     Exam additionally reviewed by Dr. Merritt Reaves. Agree with the stated  findings and impression.  This report was finalized on 07/31/2018 15:19 by Dr Merritt Reaves, .    US Gallbladder [[id number]] Collected:  07/30/18 1419     Updated:  07/30/18 1423    Narrative:       EXAMINATION:   US GALLBLADDER-  7/30/2018 2:19 PM CDT     HISTORY: ULTRASOUND GALLBLADDER 7/30/2018 1:37 PM CDT     REASON FOR EXAM: nausea, epi pain; R07.9-Chest pain, unspecified;  R10.13-Epigastric pain       COMPARISON: NONE      TECHNIQUE: Multiple longitudinal and transverse realtime  sonographic  images of the right upper quadrant of the abdomen are obtained.      FINDINGS:    Pancreas: Normal in size and echogenicity.      Liver: Liver is mildly echogenic. This represents hepatic steatosis..      Gallbladder: No intraluminal echoes, wall thickening, or pericholecystic  fluid.      Bile ducts: The CBD measures 0.4 cm in diameter. There is no  intrahepatic or extrahepatic ductal dilation.      Right kidney: Normal in size, shape and contour. No mass, hydronephrosis  or calculi. No perinephric fluid collection.       Other: The visualized abdominal aorta is normal in caliber.        Impression:       Mildly echogenic liver representing mild hepatic steatosis        This report was finalized on 07/30/2018 14:20 by Dr. Maco Akbar MD.          Objective     Allergies   Allergen Reactions   • Flagyl [Metronidazole] Hives   • Atorvastatin Other (See Comments)     Muscle cramps   • Ciprofloxacin Hives       Medication Review: Performed  Current Facility-Administered Medications   Medication Dose Route Frequency Provider Last Rate Last Dose   • aluminum-magnesium hydroxide-simethicone (MAALOX MAX) 400-400-40 MG/5ML suspension 15 mL  15 mL Oral Q6H PRN Gregor Bell MD       • aspirin chewable tablet 81 mg  81 mg Oral Daily Gregor Bell MD   81 mg at 07/30/18 1430   • bisacodyl (DULCOLAX) EC tablet 5 mg  5 mg Oral Daily PRN Gregor Bell MD       • dicyclomine (BENTYL) tablet 20 mg  20 mg Oral Q6H PRN Gregor Bell MD       • DOBUTamine (DOBUTREX) 1 mg/mL infusion (ECHO)  10-50 mcg/kg/min Intravenous Continuous Gregor Bell MD   Stopped at 07/29/18 1138   • enoxaparin (LOVENOX) syringe 30 mg  30 mg Subcutaneous Once Shane Ann MD       • fluticasone (FLONASE) 50 MCG/ACT nasal spray 2 spray  2 spray Nasal Daily Gregor Bell MD   2 spray at 07/31/18 0905   • insulin detemir (LEVEMIR) injection 60 Units  60 Units Subcutaneous  Daily Gregor Bell MD   60 Units at 07/30/18 0849   • insulin detemir (LEVEMIR) injection 72 Units  72 Units Subcutaneous Nightly Gregor Bell MD   72 Units at 07/31/18 2124   • insulin lispro (humaLOG) injection 25 Units  25 Units Subcutaneous TID With Meals Gregor Bell MD   25 Units at 07/30/18 1807   • ipratropium-albuterol (DUO-NEB) nebulizer solution 3 mL  3 mL Nebulization Q4H PRN Gabby Fam DO       • isosorbide dinitrate (ISORDIL) tablet 20 mg  20 mg Oral BID - Nitrates Gregor Bell MD   20 mg at 07/31/18 1728   • lamoTRIgine (LaMICtal) tablet 100 mg  100 mg Oral BID Gregor Bell MD   100 mg at 07/31/18 2120   • levETIRAcetam (KEPPRA) tablet 1,500 mg  1,500 mg Oral Daily Gregor Bell MD   1,500 mg at 07/30/18 1430   • levETIRAcetam (KEPPRA) tablet 2,000 mg  2,000 mg Oral Nightly Gregor Bell MD   2,000 mg at 07/31/18 2120   • Liraglutide (VICTOZA) injection 1.2 mg  1.2 mg Subcutaneous Nightly Gregor Bell MD   1.2 mg at 07/31/18 2121   • lisinopril (PRINIVIL,ZESTRIL) tablet 20 mg  20 mg Oral Nightly Gregor Bell MD   20 mg at 07/31/18 2120   • magnesium hydroxide (MILK OF MAGNESIA) suspension 2400 mg/10mL 10 mL  10 mL Oral Daily PRN Gregor Bell MD       • metoprolol tartrate (LOPRESSOR) tablet 50 mg  50 mg Oral Q12H Gregor Bell MD   50 mg at 07/31/18 2120   • Morphine sulfate (PF) injection 4 mg  4 mg Intravenous Q4H PRN Gregor Bell MD        And   • naloxone (NARCAN) injection 0.4 mg  0.4 mg Intravenous Q5 Min PRN Gregor Bell MD       • nitroglycerin (NITROSTAT) SL tablet 0.4 mg  0.4 mg Sublingual Q5 Min PRN TEENA Freeman   0.4 mg at 07/29/18 1152   • ondansetron (ZOFRAN) injection 4 mg  4 mg Intravenous Q6H PRN Gregor Bell MD       • pantoprazole (PROTONIX) EC tablet 40 mg  40 mg Oral BID AC Gregor Bell,  "MD   40 mg at 07/31/18 1728   • polyvinyl alcohol (LIQUIFILM) 1.4 % ophthalmic solution 2 drop  2 drop Both Eyes Q3H PRN Gregor Bell MD       • rosuvastatin (CRESTOR) tablet 20 mg  20 mg Oral Nightly Gregor Bell MD   20 mg at 07/31/18 2120   • sodium chloride 0.9 % flush 1-10 mL  1-10 mL Intravenous PRN Gregor Bell MD       • sodium chloride 0.9 % infusion 500 mL  500 mL Intravenous Continuous PRN Hamilton Nuñez CRNA   Stopped at 07/30/18 1338   • sodium chloride 0.9 % infusion  100 mL/hr Intravenous Continuous Wade Ramey  mL/hr at 07/31/18 0644 100 mL/hr at 07/31/18 0644   • sucralfate (CARAFATE) tablet 1 g  1 g Oral 4x Daily AC & at Bedtime Benitez Mas MD   1 g at 07/31/18 2120   • triamterene-hydrochlorothiazide (MAXZIDE) 75-50 MG per tablet 0.5 tablet  0.5 tablet Oral Daily Gregor Bell MD   0.5 tablet at 07/31/18 0905       Vital Sign Min/Max for last 24 hours  Temp  Min: 97 °F (36.1 °C)  Max: 98.6 °F (37 °C)   BP  Min: 99/56  Max: 141/88   Pulse  Min: 69  Max: 79   Resp  Min: 16  Max: 20   SpO2  Min: 95 %  Max: 99 %   No Data Recorded   Weight  Min: 110 kg (242 lb 9.6 oz)  Max: 110 kg (243 lb)     Flowsheet Rows      First Filed Value   Admission Height  182.9 cm (72\") Documented at 07/28/2018 1339   Admission Weight  107 kg (235 lb 12.8 oz) Documented at 07/28/2018 1339          Results for orders placed during the hospital encounter of 07/28/18   Adult Stress Echo W/ Cont or Stress Agent if Necessary Per Protocol    Narrative · Left ventricular systolic function is normal. Estimated EF = 55%.  · Abnormal stress echo with echocardiographic evidence for myocardial   ischemia located in the septal wall and apical wall.          Physical Exam:    General Appearance: Awake, alert, in no acute distress  Eyes: Pupils equal and reactive    Ears: Appear intact with no abnormalities noted  Nose: Nares normal, no drainage  Neck: supple, trachea " midline, no carotid bruit and no JVD  Back: no kyphosis present,    Lungs: respirations regular, respirations even and respirations unlabored  Heart: normal S1, S2,  2/6 pansystolic murmur in the left sternal border,  no rub and no click  Abdomen: normal bowel sounds, no masses, no hepatomegaly, no splenomegaly, guarding and no rebound tenderness   Skin: no bleeding, bruising or rash  Extremities: no cyanosis  Psychiatric/Behavioral: Negative for agitation, behavioral problems, confusion, the patient does  appear to be nervous/anxious.          Results Review:   I reviewed the patient's new clinical results.  I reviewed the patient's new imaging results and agree with the interpretation.  I reviewed the patient's other test results and agree with the interpretation  I personally viewed and interpreted the patient's EKG/Telemetry data  Discussed with patient,    Reviewed available prior notes, consults, prior visits, laboratory findings, radiology and cardiology relevant reports. Updated chart as applicable. I have reviewed the patient's medical history in detail and updated the computerized patient record as relevant.    Updated patient regarding any new or relevant abnormalities on review of records or any new findings on physical exam. Mentioned to patient about purpose of visit and desirable health short and long term goals and objectives.     Assessment/Plan     Principal Problem:    Epigastric pain  Active Problems:    Precordial pain    Chest pain  positive stress test overall in a small apical distribution  Abdominal discomfort with postprandial abdominal pain and some nausea    Plan    Keep nothing by mouth  Patient now has more of epigastric and right upper quadrant pain worse with eating at times worse with exertion  Per my dictation for yesterday will proceed with HIDA scan later this morning  This subsequently showed a gallbladder ejection fraction of 2%  Discussed with patient multiple times  and with  nursing staff and subsequently surgical consultation was requested  Later in the day Dr. Ann saw the patient and advised cholecystectomy  Maximize medical therapy  Supportive care  Telemetry  Optimal medical therapy  Deep vein thrombosis prophylaxis/precautions  Appropriate diet, fluid, sodium, caffeine, stimulants intake   Compliance to diet and medications   Avoid NSAIDS    Wade Ramey MD  07/31/18  10:13 PM    EMR Dragon/Transcription was used to dictate part of this note

## 2018-08-01 NOTE — ANESTHESIA PROCEDURE NOTES
Airway  Urgency: elective    Airway not difficult    General Information and Staff    Patient location during procedure: OR  CRNA: ANIVAL BUSTAMANTE    Indications and Patient Condition  Indications for airway management: airway protection    Preoxygenated: yes  MILS not maintained throughout  Mask difficulty assessment: 1 - vent by mask    Final Airway Details  Final airway type: endotracheal airway      Successful airway: ETT  Cuffed: yes   Successful intubation technique: direct laryngoscopy  Facilitating devices/methods: intubating stylet  Endotracheal tube insertion site: oral  Blade: Balderas  ETT size: 8.0 mm  Cormack-Lehane Classification: grade IIa - partial view of glottis  Placement verified by: chest auscultation and capnometry   Measured from: lips  ETT to lips (cm): 23  Number of attempts at approach: 1

## 2018-08-01 NOTE — ANESTHESIA PREPROCEDURE EVALUATION
Anesthesia Evaluation     Patient summary reviewed   history of anesthetic complications: prolonged sedation  NPO Solid Status: > 8 hours             Airway   Mallampati: II  TM distance: >3 FB  Neck ROM: full  Dental          Pulmonary    (+) asthma, sleep apnea on CPAP,   (-) not a smoker  Cardiovascular   Exercise tolerance: good (4-7 METS)    ECG reviewed  Patient on routine beta blocker and Beta blocker given within 24 hours of surgery    (+) hypertension, past MI , CAD, cardiac stents (2016) angina, hyperlipidemia,   (-) pacemaker    ROS comment: A/w chest and upper abdominal pain.  DSE was positive for small area of apical ischemia however cardiology believes pain is due to GB dysfunction.  They do not think a LHC is necessary and will follow as a outpatient.    Neuro/Psych  (+) seizures well controlled, CVA (2011), headaches,     GI/Hepatic/Renal/Endo    (+) obesity,  GERD,  diabetes mellitus using insulin,   (-) liver disease, no renal disease    Musculoskeletal     Abdominal    Substance History      OB/GYN          Other                        Anesthesia Plan    ASA 3     general   (5 lead ekg.  Increased risk of MACE d/w patient.)  intravenous induction   Anesthetic plan and risks discussed with patient.

## 2018-08-01 NOTE — PLAN OF CARE
Problem: Patient Care Overview  Goal: Plan of Care Review   08/01/18 6769   Coping/Psychosocial   Plan of Care Reviewed With patient   Plan of Care Review   Progress no change   OTHER   Outcome Summary VSS, oriented x4, going to surgery today at 12-1pm, NPO after midnight, will continue to monitor

## 2018-08-01 NOTE — ANESTHESIA POSTPROCEDURE EVALUATION
"Patient: Carlos A Boyer Jr.    Procedure Summary     Date:  08/01/18 Room / Location:   [facility] 08 /  [facility]    Anesthesia Start:  1317 Anesthesia Stop:  1459    Procedure:  CHOLECYSTECTOMY LAPAROSCOPIC INTRAOPERATIVE CHOLANGIOGRAM (N/A Abdomen) Diagnosis:  (BILIARY DYSKINESIA)    Surgeon:  Shane Ann MD Provider:  Sumanth Taveras CRNA    Anesthesia Type:  general ASA Status:  3          Anesthesia Type: general  Last vitals  BP   124/75 (08/01/18 1617)   Temp   97.1 °F (36.2 °C) (08/01/18 1617)   Pulse   65 (08/01/18 1617)   Resp   16 (08/01/18 1617)     SpO2   93 % (08/01/18 1617)     Post Anesthesia Care and Evaluation    PONV Status: none  Comments: Patient d/c from PACU prior to anes eval based on Shayla score.  Please see RN notes for details of d/c criteria.    Blood pressure 124/75, pulse 65, temperature 97.1 °F (36.2 °C), temperature source Temporal Artery , resp. rate 16, height 182.9 cm (72\"), weight 110 kg (242 lb 9.6 oz), SpO2 93 %.          "

## 2018-08-02 VITALS
HEART RATE: 68 BPM | OXYGEN SATURATION: 97 % | WEIGHT: 243.3 LBS | TEMPERATURE: 97.5 F | HEIGHT: 72 IN | RESPIRATION RATE: 18 BRPM | BODY MASS INDEX: 32.95 KG/M2 | SYSTOLIC BLOOD PRESSURE: 104 MMHG | DIASTOLIC BLOOD PRESSURE: 74 MMHG

## 2018-08-02 LAB
ALBUMIN SERPL-MCNC: 4.4 G/DL (ref 3.5–5)
ALBUMIN/GLOB SERPL: 1.5 G/DL (ref 1.1–2.5)
ALP SERPL-CCNC: 77 U/L (ref 24–120)
ALT SERPL W P-5'-P-CCNC: 63 U/L (ref 0–54)
ANION GAP SERPL CALCULATED.3IONS-SCNC: 12 MMOL/L (ref 4–13)
AST SERPL-CCNC: 59 U/L (ref 7–45)
BILIRUB SERPL-MCNC: 0.8 MG/DL (ref 0.1–1)
BUN BLD-MCNC: 23 MG/DL (ref 5–21)
BUN/CREAT SERPL: 21.9 (ref 7–25)
CALCIUM SPEC-SCNC: 9.8 MG/DL (ref 8.4–10.4)
CHLORIDE SERPL-SCNC: 101 MMOL/L (ref 98–110)
CO2 SERPL-SCNC: 24 MMOL/L (ref 24–31)
CREAT BLD-MCNC: 1.05 MG/DL (ref 0.5–1.4)
DEPRECATED RDW RBC AUTO: 38.9 FL (ref 40–54)
ERYTHROCYTE [DISTWIDTH] IN BLOOD BY AUTOMATED COUNT: 12.1 % (ref 12–15)
GFR SERPL CREATININE-BSD FRML MDRD: 72 ML/MIN/1.73
GLOBULIN UR ELPH-MCNC: 3 GM/DL
GLUCOSE BLD-MCNC: 220 MG/DL (ref 70–100)
GLUCOSE BLDC GLUCOMTR-MCNC: 243 MG/DL (ref 70–130)
GLUCOSE BLDC GLUCOMTR-MCNC: 249 MG/DL (ref 70–130)
HCT VFR BLD AUTO: 39.4 % (ref 40–52)
HGB BLD-MCNC: 13.6 G/DL (ref 14–18)
MCH RBC QN AUTO: 30 PG (ref 28–32)
MCHC RBC AUTO-ENTMCNC: 34.5 G/DL (ref 33–36)
MCV RBC AUTO: 87 FL (ref 82–95)
PLATELET # BLD AUTO: 108 10*3/MM3 (ref 130–400)
PMV BLD AUTO: 12 FL (ref 6–12)
POTASSIUM BLD-SCNC: 4.6 MMOL/L (ref 3.5–5.3)
PROT SERPL-MCNC: 7.4 G/DL (ref 6.3–8.7)
RBC # BLD AUTO: 4.53 10*6/MM3 (ref 4.8–5.9)
SODIUM BLD-SCNC: 137 MMOL/L (ref 135–145)
WBC NRBC COR # BLD: 7.04 10*3/MM3 (ref 4.8–10.8)

## 2018-08-02 PROCEDURE — 63710000001 INSULIN LISPRO (HUMAN) PER 5 UNITS: Performed by: INTERNAL MEDICINE

## 2018-08-02 PROCEDURE — 94799 UNLISTED PULMONARY SVC/PX: CPT

## 2018-08-02 PROCEDURE — 85027 COMPLETE CBC AUTOMATED: CPT | Performed by: SPECIALIST

## 2018-08-02 PROCEDURE — 80053 COMPREHEN METABOLIC PANEL: CPT | Performed by: SPECIALIST

## 2018-08-02 PROCEDURE — 63710000001 INSULIN DETEMIR PER 5 UNITS: Performed by: INTERNAL MEDICINE

## 2018-08-02 PROCEDURE — 94760 N-INVAS EAR/PLS OXIMETRY 1: CPT

## 2018-08-02 PROCEDURE — 82962 GLUCOSE BLOOD TEST: CPT

## 2018-08-02 RX ORDER — METHYLCELLULOSE 2 G/19G
2 POWDER, FOR SOLUTION ORAL DAILY
Qty: 60 G | Refills: 6 | Status: SHIPPED | OUTPATIENT
Start: 2018-08-02 | End: 2018-09-01

## 2018-08-02 RX ORDER — ONDANSETRON HCL 8 MG
8 TABLET ORAL EVERY 8 HOURS PRN
Qty: 10 TABLET | Refills: 1 | Status: SHIPPED | OUTPATIENT
Start: 2018-08-02 | End: 2019-05-30

## 2018-08-02 RX ORDER — HYDROCODONE BITARTRATE AND ACETAMINOPHEN 7.5; 325 MG/1; MG/1
1 TABLET ORAL EVERY 4 HOURS PRN
Qty: 40 TABLET | Refills: 0 | Status: SHIPPED | OUTPATIENT
Start: 2018-08-02 | End: 2019-07-10 | Stop reason: HOSPADM

## 2018-08-02 RX ADMIN — PANTOPRAZOLE SODIUM 40 MG: 40 TABLET, DELAYED RELEASE ORAL at 05:21

## 2018-08-02 RX ADMIN — FLUTICASONE PROPIONATE 2 SPRAY: 50 SPRAY, METERED NASAL at 08:57

## 2018-08-02 RX ADMIN — FAMOTIDINE 20 MG: 20 TABLET, FILM COATED ORAL at 09:02

## 2018-08-02 RX ADMIN — METHYLCELLULOSE 1000 MG: 500 TABLET ORAL at 08:59

## 2018-08-02 RX ADMIN — TRIAMTERENE AND HYDROCHLOROTHIAZIDE 0.5 TABLET: 75; 50 TABLET ORAL at 08:59

## 2018-08-02 RX ADMIN — HYDROCODONE BITARTRATE AND ACETAMINOPHEN 1 TABLET: 7.5; 325 TABLET ORAL at 14:55

## 2018-08-02 RX ADMIN — INSULIN LISPRO 25 UNITS: 100 INJECTION, SOLUTION INTRAVENOUS; SUBCUTANEOUS at 12:36

## 2018-08-02 RX ADMIN — ISOSORBIDE DINITRATE 20 MG: 20 TABLET ORAL at 08:59

## 2018-08-02 RX ADMIN — INSULIN LISPRO 25 UNITS: 100 INJECTION, SOLUTION INTRAVENOUS; SUBCUTANEOUS at 08:55

## 2018-08-02 RX ADMIN — ASPIRIN 81 MG: 81 TABLET, CHEWABLE ORAL at 08:59

## 2018-08-02 RX ADMIN — SUCRALFATE 1 G: 1 TABLET ORAL at 12:37

## 2018-08-02 RX ADMIN — HYDROCODONE BITARTRATE AND ACETAMINOPHEN 1 TABLET: 7.5; 325 TABLET ORAL at 10:27

## 2018-08-02 RX ADMIN — LEVETIRACETAM 1500 MG: 500 TABLET, FILM COATED ORAL at 08:59

## 2018-08-02 RX ADMIN — SUCRALFATE 1 G: 1 TABLET ORAL at 05:21

## 2018-08-02 RX ADMIN — LAMOTRIGINE 100 MG: 100 TABLET ORAL at 08:59

## 2018-08-02 RX ADMIN — HYDROCODONE BITARTRATE AND ACETAMINOPHEN 1 TABLET: 7.5; 325 TABLET ORAL at 05:29

## 2018-08-02 RX ADMIN — METOPROLOL TARTRATE 50 MG: 50 TABLET ORAL at 08:59

## 2018-08-02 RX ADMIN — INSULIN DETEMIR 60 UNITS: 100 INJECTION, SOLUTION SUBCUTANEOUS at 08:54

## 2018-08-02 NOTE — DISCHARGE SUMMARY
[facility] Medicine Services  DISCHARGE SUMMARY       Date of Admission: 7/28/2018  Date of Discharge:  8/2/2018  Primary Care Physician: Vinayak Correia PA    Presenting Problem/History of Present Illness:  Chest pain, unspecified type [R07.9]  Chest pain, unspecified type [R07.9]  Chest pain, unspecified type [R07.9]     Final Discharge Diagnoses:  1.  Chest pain-positive dobutamine stress echo  2.  Biliary dyskinesia with gallbladder EF 2% on HIDA scan  3.  Gastroesophageal reflux disease without esophagitis  4.  Type II Diabetes Mellitus-insulin dependent A1C 7.2%  5.  Obesity BMI 32  6.  Obstructive sleep apnea  7.  Mild acute kidney injury- improved  8.  Nausea/vomiting   9.  Irritable bowel syndrome  10.  Thrombocytopenia- mild    Consults:   1.  Dr. Wade Ramey-cardiology   2.  Dr. Benitez Mas- gastroenterology  3.  Dr. Shane Ann-general surgery    Procedures Performed:   1.  07/30/2018-upper GI endoscopy  2.  08/01/2018-laparoscopic cholecystectomy with intraoperative cholangiogram    Pertinent Test Results:   Lab Results (all)     Procedure Component Value Units Date/Time    POC Glucose Once [[id number]]  (Abnormal) Collected:  08/02/18 0752    Specimen:  Blood Updated:  08/02/18 0835     Glucose 243 (H) mg/dL      Comment: : [id number] [facility]p [facility] ID: [id number]       Tissue Pathology Exam [[id number]] Collected:  08/01/18 1420    Specimen:  Tissue from Gallbladder Updated:  08/02/18 0748    Comprehensive Metabolic Panel [[id number]] Updated:  08/02/18 0559    Specimen:  Blood     CBC (No Diff) [[id number]]  (Abnormal) Collected:  08/02/18 0518    Specimen:  Blood Updated:  08/02/18 0536     WBC 7.04 10*3/mm3      RBC 4.53 (L) 10*6/mm3      Hemoglobin 13.6 (L) g/dL      Hematocrit 39.4 (L) %      MCV 87.0 fL      MCH 30.0 pg      MCHC 34.5 g/dL      RDW 12.1 %      RDW-SD 38.9 (L) fl      MPV 12.0 fL      Platelets 108 (L) 10*3/mm3     POC Glucose Once  [[id number]]  (Abnormal) Collected:  08/01/18 2041    Specimen:  Blood Updated:  08/01/18 2056     Glucose 207 (H) mg/dL      Comment: : [id number] English SethMeter ID: [id number]       POC Glucose Once [[id number]]  (Abnormal) Collected:  08/01/18 1624    Specimen:  Blood Updated:  08/01/18 1643     Glucose 237 (H) mg/dL      Comment: : [id number] [place] Liquid SpinseeMeter ID: [id number]       POC Glucose Once [[id number]]  (Abnormal) Collected:  08/01/18 1501    Specimen:  Blood Updated:  08/01/18 1513     Glucose 178 (H) mg/dL      Comment: : [id number] Rachna MercedeshMeter ID: [id number]       POC Glucose Once [[id number]]  (Abnormal) Collected:  08/01/18 1201    Specimen:  Blood Updated:  08/01/18 1212     Glucose 137 (H) mg/dL      Comment: : [id number] [place] Liquid SpinseeMeter ID: [id number]       POC Glucose Once [[id number]]  (Abnormal) Collected:  08/01/18 0758    Specimen:  Blood Updated:  08/01/18 0809     Glucose 198 (H) mg/dL      Comment: : [id number] [place] Liquid SpinseeMeter ID: [id number]       Basic Metabolic Panel [[id number]]  (Abnormal) Collected:  08/01/18 0631    Specimen:  Blood Updated:  08/01/18 0725     Glucose 190 (H) mg/dL      BUN 18 mg/dL      Creatinine 0.95 mg/dL      Sodium 139 mmol/L      Potassium 4.7 mmol/L      Chloride 103 mmol/L      CO2 22.0 (L) mmol/L      Calcium 9.7 mg/dL      eGFR Non African Amer 81 mL/min/1.73      BUN/Creatinine Ratio 18.9     Anion Gap 14.0 (H) mmol/L     Narrative:       GFR Normal >60  Chronic Kidney Disease <60  Kidney Failure <15    CBC (No Diff) [[id number]]  (Abnormal) Collected:  08/01/18 0355    Specimen:  Blood Updated:  08/01/18 0451     WBC 5.09 10*3/mm3      RBC 4.76 (L) 10*6/mm3      Hemoglobin 14.2 g/dL      Hematocrit 41.5 %      MCV 87.2 fL      MCH 29.8 pg      MCHC 34.2 g/dL      RDW 12.3 %      RDW-SD 39.7 (L) fl      MPV 11.4 fL      Platelets 116 (L) 10*3/mm3     POC Glucose Once [[id number]]  (Abnormal) Collected:  07/31/18 2000     Specimen:  Blood Updated:  07/31/18 2010     Glucose 190 (H) mg/dL      Comment: : [id number] Rosetta HareMeter ID: [id number]       POC Glucose Once [[id number]]  (Normal) Collected:  07/31/18 1603    Specimen:  Blood Updated:  07/31/18 1614     Glucose 103 mg/dL      Comment: : [id number] Helena KaleeMeter ID: [id number]       Urease For H Pylori - Tissue, Stomach [[id number]]  (Normal) Collected:  07/30/18 1311    Specimen:  Tissue from Stomach Updated:  07/31/18 1341     Urease Negative    POC Glucose Once [[id number]]  (Abnormal) Collected:  07/31/18 0743    Specimen:  Blood Updated:  07/31/18 0754     Glucose 187 (H) mg/dL      Comment: : [id number] Helena GaneeMeter ID: [id number]       CBC Auto Differential [[id number]]  (Abnormal) Collected:  07/31/18 0457    Specimen:  Blood Updated:  07/31/18 0557     WBC 5.83 10*3/mm3      RBC 4.87 10*6/mm3      Hemoglobin 14.2 g/dL      Hematocrit 42.7 %      MCV 87.7 fL      MCH 29.2 pg      MCHC 33.3 g/dL      RDW 12.5 %      RDW-SD 40.0 fl      MPV 11.7 fL      Platelets 123 (L) 10*3/mm3      Neutrophil % 45.8 %      Lymphocyte % 34.5 %      Monocyte % 9.9 %      Eosinophil % 8.6 (H) %      Basophil % 0.9 %      Neutrophils, Absolute 2.67 10*3/mm3      Lymphocytes, Absolute 2.01 10*3/mm3      Monocytes, Absolute 0.58 10*3/mm3      Eosinophils, Absolute 0.50 10*3/mm3      Basophils, Absolute 0.05 10*3/mm3     Basic Metabolic Panel [[id number]]  (Abnormal) Collected:  07/31/18 0457    Specimen:  Blood Updated:  07/31/18 0556     Glucose 196 (H) mg/dL      BUN 21 mg/dL      Creatinine 1.07 mg/dL      Sodium 138 mmol/L      Potassium 4.6 mmol/L      Chloride 101 mmol/L      CO2 24.0 mmol/L      Calcium 9.6 mg/dL      eGFR Non African Amer 70 mL/min/1.73      BUN/Creatinine Ratio 19.6     Anion Gap 13.0 mmol/L     POC Glucose Once [[id number]]  (Abnormal) Collected:  07/30/18 2001    Specimen:  Blood Updated:  07/30/18 2012     Glucose 179 (H) mg/dL       Comment: : [id number] Rosetta YoungerleyMeter ID: [id number]       POC Glucose Once [[id number]]  (Abnormal) Collected:  07/30/18 1534    Specimen:  Blood Updated:  07/30/18 1620     Glucose 163 (H) mg/dL      Comment: : [id number] Marlon EuraisaMeter ID: [id number]       Basic Metabolic Panel [[id number]]  (Abnormal) Collected:  07/30/18 0424    Specimen:  Blood Updated:  07/30/18 0921     Glucose 186 (H) mg/dL      BUN 23 (H) mg/dL      Creatinine 1.12 mg/dL      Sodium 140 mmol/L      Potassium 4.7 mmol/L      Chloride 102 mmol/L      CO2 25.0 mmol/L      Calcium 10.0 mg/dL      eGFR Non African Amer 67 mL/min/1.73      BUN/Creatinine Ratio 20.5     Anion Gap 13.0 mmol/L     POC Glucose Once [[id number]]  (Abnormal) Collected:  07/30/18 0826    Specimen:  Blood Updated:  07/30/18 0847     Glucose 161 (H) mg/dL      Comment: : [id number] Marlon EuraisaMeter ID: [id number]       TSH [[id number]]  (Normal) Collected:  07/30/18 0424    Specimen:  Blood Updated:  07/30/18 0542     TSH 1.590 mIU/mL     POC Glucose Once [[id number]]  (Abnormal) Collected:  07/29/18 1951    Specimen:  Blood Updated:  07/29/18 2002     Glucose 226 (H) mg/dL      Comment: : [id number] Rosetta YoungerleyMeter ID: [id number]       POC Glucose Once [[id number]]  (Abnormal) Collected:  07/29/18 1641    Specimen:  Blood Updated:  07/29/18 1652     Glucose 132 (H) mg/dL      Comment: : [id number] Marlon EuraisaMeter ID: [id number]       Basic Metabolic Panel [[id number]]  (Abnormal) Collected:  07/29/18 0356    Specimen:  Blood Updated:  07/29/18 1350     Glucose 188 (H) mg/dL      BUN 21 mg/dL      Creatinine 1.29 mg/dL      Sodium 141 mmol/L      Potassium 3.8 mmol/L      Chloride 101 mmol/L      CO2 25.0 mmol/L      Calcium 9.7 mg/dL      eGFR Non African Amer 57 (L) mL/min/1.73      BUN/Creatinine Ratio 16.3     Anion Gap 15.0 (H) mmol/L     POC Glucose Once [[id number]]  (Abnormal) Collected:  07/29/18 0757    Specimen:  Blood Updated:   07/29/18 0832     Glucose 180 (H) mg/dL      Comment: : [id number] Marlon EuraisaMeter ID: [id number]       POC Glucose Once [[id number]]  (Abnormal) Collected:  07/29/18 0552    Specimen:  Blood Updated:  07/29/18 0604     Glucose 264 (H) mg/dL      Comment: : [id number] Crissy McnamarairMeter ID: [id number]       Hemoglobin A1c [[id number]] Collected:  07/29/18 0355    Specimen:  Blood Updated:  07/29/18 0456     Hemoglobin A1C 7.2 %     Lipid Panel [[id number]]  (Abnormal) Collected:  07/29/18 0356    Specimen:  Blood Updated:  07/29/18 0424     Total Cholesterol 167 mg/dL      Triglycerides 409 (H) mg/dL      HDL Cholesterol 32 (L) mg/dL      LDL Cholesterol  89 mg/dL      LDL/HDL Ratio --     Comment: Unable to calculate       Troponin [[id number]]  (Normal) Collected:  07/28/18 2328    Specimen:  Blood Updated:  07/29/18 0019     Troponin I <0.012 ng/mL     POC Glucose Once [[id number]]  (Abnormal) Collected:  07/28/18 1935    Specimen:  Blood Updated:  07/28/18 1947     Glucose 133 (H) mg/dL      Comment: : [id number] Rosetta HareMeter ID: [id number]       Troponin [[id number]]  (Normal) Collected:  07/28/18 1645    Specimen:  Blood from Arm, Right Updated:  07/28/18 1722     Troponin I 0.019 ng/mL      Comment: Auto resulted.       Troponin [[id number]]  (Normal) Collected:  07/28/18 1343    Specimen:  Blood from Arm, Right Updated:  07/28/18 1416     Troponin I <0.012 ng/mL     Comprehensive Metabolic Panel [[id number]]  (Abnormal) Collected:  07/28/18 1343    Specimen:  Blood from Arm, Right Updated:  07/28/18 1414     Glucose 257 (H) mg/dL      BUN 22 (H) mg/dL      Creatinine 1.37 mg/dL      Sodium 140 mmol/L      Potassium 4.7 mmol/L      Chloride 103 mmol/L      CO2 22.0 (L) mmol/L      Calcium 10.0 mg/dL      Total Protein 7.9 g/dL      Albumin 4.80 g/dL      ALT (SGPT) 35 U/L      AST (SGOT) 36 U/L      Alkaline Phosphatase 95 U/L      Total Bilirubin 0.7 mg/dL      eGFR Non  Amer 53 (L)  mL/min/1.73      Globulin 3.1 gm/dL      A/G Ratio 1.5 g/dL      BUN/Creatinine Ratio 16.1     Anion Gap 15.0 (H) mmol/L     Protime-INR [[id number]]  (Abnormal) Collected:  07/28/18 1343    Specimen:  Blood from Arm, Right Updated:  07/28/18 1412     Protime 12.3 Seconds      INR 0.89 (L)    aPTT [[id number]]  (Normal) Collected:  07/28/18 1343    Specimen:  Blood from Arm, Right Updated:  07/28/18 1412     PTT 25.4 seconds     Lipase [[id number]]  (Normal) Collected:  07/28/18 1343    Specimen:  Blood from Arm, Right Updated:  07/28/18 1411     Lipase 92 U/L     CBC Auto Differential [[id number]]  (Abnormal) Collected:  07/28/18 1343    Specimen:  Blood from Arm, Right Updated:  07/28/18 1407     WBC 6.44 10*3/mm3      RBC 5.12 10*6/mm3      Hemoglobin 15.1 g/dL      Hematocrit 44.2 %      MCV 86.3 fL      MCH 29.5 pg      MCHC 34.2 g/dL      RDW 12.5 %      RDW-SD 39.5 (L) fl      MPV 11.9 fL      Platelets 148 10*3/mm3      Neutrophil % 54.7 %      Lymphocyte % 30.7 %      Monocyte % 8.1 %      Eosinophil % 5.7 (H) %      Basophil % 0.5 %      Immature Grans % 0.3 %      Neutrophils, Absolute 3.52 10*3/mm3      Lymphocytes, Absolute 1.98 10*3/mm3      Monocytes, Absolute 0.52 10*3/mm3      Eosinophils, Absolute 0.37 10*3/mm3      Basophils, Absolute 0.03 10*3/mm3      Immature Grans, Absolute 0.02 10*3/mm3      nRBC 0.0 /100 WBC         Imaging Results (all)     Procedure Component Value Units Date/Time    FL Cholangiogram Operative [[id number]] Collected:  08/01/18 1455     Updated:  08/02/18 0759    Narrative:       FL CHOLANGIOGRAM OPERATIVE- 8/1/2018 2:05 PM CDT     HISTORY: stones; R07.9-Chest pain, unspecified; R10.13-Epigastric pain;  R07.2-Precordial pain     COMPARISON: None     FLUOROSCOPY TIME: 14 seconds     NUMBER OF IMAGES: 8       Impression:       Contrast was injected through the cystic duct stump, and multiple  fluoroscopic images of the RIGHT upper quadrant were obtained  intraoperatively. The  opacified intrahepatic and extrahepatic ducts  demonstrate no filling defects or obstruction. Contrast is noted in the  small bowel. Surgical instruments and clips are noted in the RIGHT upper  quadrant.     Please refer to the operative note for more details.  This report was finalized on 08/01/2018 14:56 by Dr Merritt Reaves, .    FL C Arm During Surgery [[id number]] Collected:  08/01/18 1455     Updated:  08/02/18 0759    Narrative:       FL CHOLANGIOGRAM OPERATIVE- 8/1/2018 2:05 PM CDT     HISTORY: stones; R07.9-Chest pain, unspecified; R10.13-Epigastric pain;  R07.2-Precordial pain     COMPARISON: None     FLUOROSCOPY TIME: 14 seconds     NUMBER OF IMAGES: 8       Impression:       Contrast was injected through the cystic duct stump, and multiple  fluoroscopic images of the RIGHT upper quadrant were obtained  intraoperatively. The opacified intrahepatic and extrahepatic ducts  demonstrate no filling defects or obstruction. Contrast is noted in the  small bowel. Surgical instruments and clips are noted in the RIGHT upper  quadrant.     Please refer to the operative note for more details.  This report was finalized on 08/01/2018 14:56 by Dr Merritt Reaves, .    NM Hepatobiliary Without CCK [[id number]] Collected:  07/31/18 1323     Updated:  07/31/18 1522    Narrative:       EXAM: NM HEPATOBILIARY WITHOUT CCK- - 7/31/2018 10:45 AM CDT     HISTORY: RUQ pain, cholecystitis suspected; R07.9-Chest pain,  unspecified; R10.13-Epigastric pain; R07.2-Precordial pain       COMPARISON: 7/30/2018.      TECHNIQUE:    5.3 mCi of Tc-99m Mebrofenin was administered intravenously and serial  anterior planar images over the liver were obtained. 30 mL corn oil  emulsion was administered orally with subsequent imaging per protocol.     FINDINGS:   Homogenous activity throughout the liver with good clearance of  background activity. This indicates good hepatocellular function.      Biliary tree activity is seen at 10 minutes. The  gallbladder is seen at  15 minutes. Bowel activity is seen at 25 minutes.     The computed gallbladder ejection fraction is 2 percent. (normal  >20  percent).           Impression:       1. Low gallbladder ejection fraction, which can be due to chronic  cholecystitis.     This examination was initially reviewed and dictated by Dr. Mari Melton.     Exam additionally reviewed by Dr. Merritt Reaves. Agree with the stated  findings and impression.  This report was finalized on 07/31/2018 15:19 by Dr Merritt Reaves, .    US Gallbladder [[id number]] Collected:  07/30/18 1419     Updated:  07/30/18 1423    Narrative:       EXAMINATION:   US GALLBLADDER-  7/30/2018 2:19 PM CDT     HISTORY: ULTRASOUND GALLBLADDER 7/30/2018 1:37 PM CDT     REASON FOR EXAM: nausea, epi pain; R07.9-Chest pain, unspecified;  R10.13-Epigastric pain       COMPARISON: NONE      TECHNIQUE: Multiple longitudinal and transverse realtime sonographic  images of the right upper quadrant of the abdomen are obtained.      FINDINGS:    Pancreas: Normal in size and echogenicity.      Liver: Liver is mildly echogenic. This represents hepatic steatosis..      Gallbladder: No intraluminal echoes, wall thickening, or pericholecystic  fluid.      Bile ducts: The CBD measures 0.4 cm in diameter. There is no  intrahepatic or extrahepatic ductal dilation.      Right kidney: Normal in size, shape and contour. No mass, hydronephrosis  or calculi. No perinephric fluid collection.       Other: The visualized abdominal aorta is normal in caliber.        Impression:       Mildly echogenic liver representing mild hepatic steatosis        This report was finalized on 07/30/2018 14:20 by Dr. Maco Akbar MD.    XR Chest 1 View [[id number]] Collected:  07/28/18 1432     Updated:  07/28/18 1435    Narrative:       XR CHEST 1 VW- 7/28/2018 2:23 PM CDT     HISTORY: chest pain       COMPARISON: 3/15/2018.     FINDINGS:   The lungs are clear. The cardiomediastinal silhouette and  pulmonary  vascularity are unchanged.      The osseous structures and surrounding soft tissues demonstrate no acute  abnormality.       Impression:       1. No radiographic evidence of acute cardiopulmonary process.        This report was finalized on 07/28/2018 14:32 by Dr. Delio Langston MD.        Chief Complaint on Day of Discharge: Abdominal pain    History of Present Illness on Day of Discharge: The patient is sitting up on the side of the bed.  He tells me he feels well today except for some abdominal pain secondary to surgery.  He has started been up and walking and running.  He is tolerating a diet without difficulty.  He denies any chest pain or shortness of breath worse than his baseline.    Hospital Course:  Mr. Boyer is a 58-year-old  male who follows Vinayak Correia PA-C for primary care.  He has a past medical history significant for coronary artery disease, diabetes mellitus type II, gastroesophageal reflux disease, essential hypertension, hyperlipidemia, migraine, seizure disorder, CVA, and sleep apnea.  She presented to the [facility] emergency department on 07/28/2018 with complaints of retrosternally located chest pain [place] the [place].  This was associated with nausea, vomiting, and diaphoresis with radiation to the left shoulder.  The patient had taken 3 nitroglycerin and a baby aspirin prior to presentation.  In the emergency department, initial troponin value was noted to be negative.  Other lab values were essentially within normal limits.  Chest x-ray no evidence of acute cardiopulmonary process.  The patient was admitted to the hospitalist service for further evaluation and management.    Troponin values were trended and noted to be negative subsequently.  Patient was sent for dobutamine stress echocardiogram on the day following admission.  This was interpreted as abnormal exam with echocardiographic evidence for myocardial ischemia located in the  "septal wall and apical wall.  It was at this point cardiology was consult.  The patient was seen by Dr. Ramey.  Patient did have a heart cath in July 2017 which showed nonocclusive coronary arteries with widely patent stents.  Dr. Ramey did recommend a repeat heart cath after a GI evaluation.  The patient was taken for upper GI endoscopy on 07/30/2018.  This was a normal endoscopy, and a gallbladder ultrasound was then ordered.  Findings showed no intraluminal echoes, wall thickening, or pericholecystic fluid, but did show a mildly echogenic liver representing mild hepatic steatosis.  HIDA scan was performed which showed a gallbladder ejection fraction of 2%.  General surgery was consulted the patient was taken for laparoscopic cholecystectomy on 08/01/2018.  Dr. Ramey feels that the majority of the patient's presenting symptoms were secondary to his biliary dyskinesia, and he will follow up with him in the next 6 weeks with instructions to call sooner for worsening shortness of breath or chest pain.  The patient is overall hemodynamically stable and appropriate for discharge home today.  He will need follow-up with his primary care provider in 1 week.  He will need to follow-up with Dr. Ann in 1 week.    Condition on Discharge:  Stable    Physical Exam on Discharge:  /66 (BP Location: Right arm, Patient Position: Sitting)   Pulse 69   Temp 96.9 °F (36.1 °C) (Temporal Artery )   Resp 18   Ht 182.9 cm (72\")   Wt 110 kg (243 lb 4.8 oz)   SpO2 97%   BMI 33.00 kg/m²   Physical Exam   Constitutional: He is oriented to person, place, and time. He appears well-developed and well-nourished. No distress.   Obese body habitus   HENT:   Head: Normocephalic and atraumatic.   Neck: Normal range of motion. Neck supple. No JVD present. No tracheal deviation present. No thyromegaly present.   Cardiovascular: Normal rate, regular rhythm, normal heart sounds and intact distal pulses.  Exam reveals no gallop and no " friction rub.    No murmur heard.  Normal sinus rhythm 76-86 with first-degree   Pulmonary/Chest: Effort normal and breath sounds normal. He has no wheezes. He has no rales.   Abdominal: Bowel sounds are normal. He exhibits distension. There is tenderness. There is no guarding.   Musculoskeletal: Normal range of motion. He exhibits no edema or tenderness.   Lymphadenopathy:     He has no cervical adenopathy.   Neurological: He is alert and oriented to person, place, and time. No cranial nerve deficit.   Skin: Skin is warm and dry. No erythema. No pallor.   Dry scaly skin.  Laparoscopic incision ×4 to abdomen with Tegaderm clean/dry/intact.   Psychiatric: He has a normal mood and affect. His behavior is normal. Judgment and thought content normal.   Vitals reviewed.    Discharge Disposition:  Home or Self Care    Discharge Medications:     Discharge Medications      New Medications      Instructions Start Date   CITRUCEL oral powder  Generic drug:  methylcellulose   2 g, Oral, Daily      HYDROcodone-acetaminophen 7.5-325 MG per tablet  Commonly known as:  NORCO   1 tablet, Oral, Every 4 Hours PRN      ZOFRAN 8 MG tablet  Generic drug:  ondansetron   8 mg, Oral, Every 8 Hours PRN         Changes to Medications      Instructions Start Date   lamoTRIgine 200 MG tablet  Commonly known as:  LaMICtal  What changed:  how much to take   200 mg, Oral, 2 Times Daily         Continue These Medications      Instructions Start Date   albuterol 108 (90 Base) MCG/ACT inhaler  Commonly known as:  PROVENTIL HFA;VENTOLIN HFA   2 puffs, Inhalation, Every 6 Hours PRN      aspirin 81 MG chewable tablet   81 mg, Oral, Daily      carboxymethylcellulose 0.5 % solution  Commonly known as:  REFRESH PLUS   1 drop, Both Eyes, 4 Times Daily PRN      dicyclomine 20 MG tablet  Commonly known as:  BENTYL   20 mg, Oral, Every 6 Hours PRN      fluticasone 50 MCG/ACT nasal spray  Commonly known as:  FLONASE   2 sprays, Nasal, Daily      insulin aspart  100 UNIT/ML solution pen-injector sc pen  Commonly known as:  novoLOG FLEXPEN   25 Units, Subcutaneous, 3 Times Daily Before Meals      Insulin Glargine 100 UNIT/ML injection pen  Commonly known as:  LANTUS SOLOSTAR   Subcutaneous, 2 Times Daily, 72 units @ night 60 units in morning      isosorbide dinitrate 20 MG tablet  Commonly known as:  ISORDIL   20 mg, Oral, 2 Times Daily      levETIRAcetam 500 MG tablet  Commonly known as:  KEPPRA   1,500 mg, Oral, Daily      levETIRAcetam 500 MG tablet  Commonly known as:  KEPPRA   2,000 mg, Oral, Nightly      lisinopril 40 MG tablet  Commonly known as:  PRINIVIL,ZESTRIL   20 mg, Oral, Nightly      metFORMIN 1000 MG tablet  Commonly known as:  GLUCOPHAGE   1,000 mg, Oral, 2 Times Daily With Meals, HOLD x 48 hours post contrast. May resume 3/18/18      metoprolol tartrate 100 MG tablet  Commonly known as:  LOPRESSOR   50 mg, Oral, Every 12 Hours Scheduled      MULTI VITAMIN PO   1 tablet, Oral, Daily      naproxen sodium 220 MG tablet  Commonly known as:  ALEVE   220 mg, Oral, Daily PRN      nitroglycerin 0.4 MG SL tablet  Commonly known as:  NITROSTAT   0.4 mg, Sublingual, Every 5 Minutes PRN, Take no more than 3 doses in 15 minutes.      ondansetron ODT 4 MG disintegrating tablet  Commonly known as:  ZOFRAN-ODT   4 mg, Oral, Every 6 Hours PRN      pantoprazole 40 MG EC tablet  Commonly known as:  PROTONIX   40 mg, Oral, 2 Times Daily      psyllium 58.6 % packet  Commonly known as:  METAMUCIL   1 packet, Oral, Daily PRN      rosuvastatin 40 MG tablet  Commonly known as:  CRESTOR   20 mg, Oral, Nightly      triamterene-hydrochlorothiazide 75-50 MG per tablet  Commonly known as:  MAXZIDE   0.5 tablets, Oral, Daily      urea 40 % ointment  Commonly known as:  CARMOL   1 application, Topical, Daily PRN, Apply to heels.      VICTOZA SC   1.2 Units, Subcutaneous, Nightly           Discharge Diet:   Diet Instructions     Advance Diet as Tolerated           Activity at Discharge:    Activity Instructions     Activity as Tolerated       Discharge Activity       1) No driving for next 4 days, showers are okay beginning on Friday, leave the dressing on as long as they are dry and waterproof if they are not waterproof go ahead and remove and do not replaced.  Do not expect a bowel movement until Sunday if no bowel movement by Monday please take 1 bottle of magnesium citrate.  No lifting or straining more than 15 pounds for the next month.        Discharge Care Plan/Instructions:   1.  Postoperative care as instructed per Dr. Ann  2.  Return for any acute or worsening symptoms    Follow-up Appointments:   1.  Primary care provider in 1 week  2.  Dr. Ann in 1 week  3.  Dr. Ramey in 6 weeks- call sooner if needed  Future Appointments  Date Time Provider Department Center   9/21/2018 10:00 AM Usha Hammer APRN [facility] None   11/14/2018 8:30 AM Wade Ramey MD [facility] None     Test Results Pending at Discharge: None    TEENA Martinez  08/02/18  10:56 AM    Time: 45 minutes    Chart reviewed  Patient examined.  Agree with assessment and plan.  Gabby Fam DO  08/02/18  12:00 PM

## 2018-08-02 NOTE — PROGRESS NOTES
LOS: 1 day   Patient Care Team:  Vinayak Correia PA as PCP - General (Physician Assistant)  Vinayak Correia PA as PCP - Family Medicine  Wade Ramey MD as PCP - Claims Attributed  Wade Ramey MD as Cardiologist (Cardiology)  Sumanth Badillo MD as Consulting Physician (Urology)  Cyril Blake MD as Referring Physician (Otolaryngology)  Mayank Ibarra MD as Consulting Physician (Otolaryngology)  Hamilton White MD as Consulting Physician (Neurology)    Chief Complaint: Principal Problem:    Epigastric pain  Active Problems:    Precordial pain    Chest pain    Shortness of breath    Subjective    Similar symptoms as yesterday  Feeling  the same  No chest pain at rest or with exertion but more with eating with associated nausea  Postprandial abdominal fullness with epigastric and right upper quadrant discomfort now  No excessive shortness of breath  No new complaints  Ambulating some in hallway without chest pain  Complains of epigastric tenderness  Interval History: Improved overall    Patient Complaints:   Chief Complaint   Patient presents with   • Chest Pain       Telemetry: no malignant arrhythmia. No significant pauses.    Review of Systems   Constitutional: No chills   Has fatigue   No fever.   HENT: Negative.    Eyes: Negative.    Respiratory: Negative for cough,   No chest wall soreness,   Shortness of breath,   no wheezing, no stridor.    Cardiovascular: Negative for chest pain,   No palpitations   No significant  leg swelling.   Gastrointestinal: mild abdominal distention,  Positive for abdominal pain,   Postprandial abdominal fullness and right upper quadrant and epigastric discomfort  No blood in stool,   No constipation,   No diarrhea,    Endocrine: Negative.    Genitourinary: Negative for difficulty urinating, dysuria, flank pain and hematuria.   Musculoskeletal: Negative.    Skin: Negative for rash and wound.   Allergic/Immunologic: Negative.    Neurological: Negative  for dizziness, syncope, weakness,   No light-headedness  No  headaches.   Hematological: Does not bruise/bleed easily.   Psychiatric/Behavioral: Negative for agitation or behavioral problems,   No confusion,   the patient is  nervous/anxious.       History:   Past Medical History:   Diagnosis Date   • Arthritis    • Asthma    • Carotid disease, bilateral (CMS/HCC)    • Chest pain    • Chronic ischemic heart disease    • Chronic sinusitis    • Kathia bullosa    • Coronary artery disease    • Deviated septum    • Diabetes mellitus (CMS/HCC)    • Difficulty urinating    • Diverticulitis    • Enlarged prostate    • Fatty liver    • GERD (gastroesophageal reflux disease)    • Hyperlipidemia    • Hypertension    • Hypertrophy of nasal turbinates    • Keratoderma    • Kidney stone    • Migraine    • Murmur, heart    • Myocardial infarction    • Obesity    • PONV (postoperative nausea and vomiting)    • Psoriasis    • Seizures (CMS/HCC)    • Sinus congestion    • Sleep apnea    • SOB (shortness of breath)    • Stroke (CMS/HCC)      Past Surgical History:   Procedure Laterality Date   • CARDIAC CATHETERIZATION  01/2016    Dr. Broadbent; widely patent previously placed stents in the left anterior descending and obstructive disease involving the diagonal branch which was treated medically   • CARDIAC CATHETERIZATION N/A 7/14/2017    Procedure: Left Heart Cath;  Surgeon: Wade Ramey MD;  Location:  [facility];  Service:    • CORONARY ANGIOPLASTY     • CORONARY STENT PLACEMENT      x 6   • ENDOSCOPIC FUNCTIONAL SINUS SURGERY (FESS) Bilateral 12/13/2017    Procedure: PROCEDURE PERFORMED:  Bilateral functional endoscopic anterior ethmoidectomy with bilateral middle meatal antrostomy Septoplasty Right kathia bullosa resection Bilateral inferior turbinate reduction via Coblation;  Surgeon: Mayank Ibarra MD;  Location: [facility] OR;  Service:    • ENDOSCOPY N/A 7/30/2018    Procedure: ESOPHAGOGASTRODUODENOSCOPY  WITH ANESTHESIA;  Surgeon: Benitez Mas MD;  Location: [facility] ENDOSCOPY;  Service: Gastroenterology   • HERNIA REPAIR      x2 inguinal area   • KIDNEY STONE SURGERY     • KNEE SURGERY Right    • OTHER SURGICAL HISTORY      urolift   • PROSTATE SURGERY      Dr. Badillo - 2017   • THUMB AMPUTATION Left     partial   • TOE AMPUTATION Right     big     Social History     Social History   • Marital status:      Spouse name: N/A   • Number of children: N/A   • Years of education: N/A     Occupational History   • Not on file.     Social History Main Topics   • Smoking status: Former Smoker     Years: 4.50     Types: Cigarettes     Quit date: 1993   • Smokeless tobacco: Former User     Types: Chew     Quit date: 2009   • Alcohol use No      Comment: quit 2009   • Drug use: No   • Sexual activity: Defer     Other Topics Concern   • Not on file     Social History Narrative   • No narrative on file     Family History   Problem Relation Age of Onset   • Heart disease Father    • No Known Problems Mother        Labs:  WBC WBC   Date Value Ref Range Status   08/01/2018 5.09 4.80 - 10.80 10*3/mm3 Final   07/31/2018 5.83 4.80 - 10.80 10*3/mm3 Final      HGB Hemoglobin   Date Value Ref Range Status   08/01/2018 14.2 14.0 - 18.0 g/dL Final   07/31/2018 14.2 14.0 - 18.0 g/dL Final      HCT Hematocrit   Date Value Ref Range Status   08/01/2018 41.5 40.0 - 52.0 % Final   07/31/2018 42.7 40.0 - 52.0 % Final      Platelets Platelets   Date Value Ref Range Status   08/01/2018 116 (L) 130 - 400 10*3/mm3 Final   07/31/2018 123 (L) 130 - 400 10*3/mm3 Final      MCV MCV   Date Value Ref Range Status   08/01/2018 87.2 82.0 - 95.0 fL Final   07/31/2018 87.7 82.0 - 95.0 fL Final          Results from last 7 days  Lab Units 08/01/18  0631 07/31/18  0457 07/30/18  0424  07/28/18  1343   SODIUM mmol/L 139 138 140  < > 140   POTASSIUM mmol/L 4.7 4.6 4.7  < > 4.7   CHLORIDE mmol/L 103 101 102  < > 103   CO2 mmol/L 22.0* 24.0 25.0  < > 22.0*    BUN mg/dL 18 21 23*  < > 22*   CREATININE mg/dL 0.95 1.07 1.12  < > 1.37   CALCIUM mg/dL 9.7 9.6 10.0  < > 10.0   BILIRUBIN mg/dL  --   --   --   --  0.7   ALK PHOS U/L  --   --   --   --  95   ALT (SGPT) U/L  --   --   --   --  35   AST (SGOT) U/L  --   --   --   --  36   GLUCOSE mg/dL 190* 196* 186*  < > 257*   < > = values in this interval not displayed.  Lab Results   Component Value Date    CKTOTAL 92 01/17/2017    CKMB 1.37 01/17/2017    TROPONINI <0.012 07/28/2018     PT/INR:  No results found for: PROTIME/No results found for: INR    Imaging Results (last 72 hours)     Procedure Component Value Units Date/Time    NM Hepatobiliary Without CCK [[id number]] Collected:  07/31/18 1323     Updated:  07/31/18 1522    Narrative:       EXAM: NM HEPATOBILIARY WITHOUT CCK- - 7/31/2018 10:45 AM CDT     HISTORY: RUQ pain, cholecystitis suspected; R07.9-Chest pain,  unspecified; R10.13-Epigastric pain; R07.2-Precordial pain       COMPARISON: 7/30/2018.      TECHNIQUE:    5.3 mCi of Tc-99m Mebrofenin was administered intravenously and serial  anterior planar images over the liver were obtained. 30 mL corn oil  emulsion was administered orally with subsequent imaging per protocol.     FINDINGS:   Homogenous activity throughout the liver with good clearance of  background activity. This indicates good hepatocellular function.      Biliary tree activity is seen at 10 minutes. The gallbladder is seen at  15 minutes. Bowel activity is seen at 25 minutes.     The computed gallbladder ejection fraction is 2 percent. (normal  >20  percent).           Impression:       1. Low gallbladder ejection fraction, which can be due to chronic  cholecystitis.     This examination was initially reviewed and dictated by Dr. Mari Melton.     Exam additionally reviewed by Dr. Merritt Reaves. Agree with the stated  findings and impression.  This report was finalized on 07/31/2018 15:19 by Dr Merritt Reaves, .    US Gallbladder [[id number]]  Collected:  07/30/18 1419     Updated:  07/30/18 1423    Narrative:       EXAMINATION:   US GALLBLADDER-  7/30/2018 2:19 PM CDT     HISTORY: ULTRASOUND GALLBLADDER 7/30/2018 1:37 PM CDT     REASON FOR EXAM: nausea, epi pain; R07.9-Chest pain, unspecified;  R10.13-Epigastric pain       COMPARISON: NONE      TECHNIQUE: Multiple longitudinal and transverse realtime sonographic  images of the right upper quadrant of the abdomen are obtained.      FINDINGS:    Pancreas: Normal in size and echogenicity.      Liver: Liver is mildly echogenic. This represents hepatic steatosis..      Gallbladder: No intraluminal echoes, wall thickening, or pericholecystic  fluid.      Bile ducts: The CBD measures 0.4 cm in diameter. There is no  intrahepatic or extrahepatic ductal dilation.      Right kidney: Normal in size, shape and contour. No mass, hydronephrosis  or calculi. No perinephric fluid collection.       Other: The visualized abdominal aorta is normal in caliber.        Impression:       Mildly echogenic liver representing mild hepatic steatosis        This report was finalized on 07/30/2018 14:20 by Dr. Maco Akbar MD.          Objective     Allergies   Allergen Reactions   • Flagyl [Metronidazole] Hives   • Atorvastatin Other (See Comments)     Muscle cramps   • Ciprofloxacin Hives       Medication Review: Performed  Current Facility-Administered Medications   Medication Dose Route Frequency Provider Last Rate Last Dose   • albuterol (PROVENTIL) nebulizer solution 0.5% 2.5 mg/0.5mL  2.5 mg Nebulization Q6H PRN Shane Ann MD       • aluminum-magnesium hydroxide-simethicone (MAALOX MAX) 400-400-40 MG/5ML suspension 15 mL  15 mL Oral Q6H PRN Gregor Bell MD       • aspirin chewable tablet 81 mg  81 mg Oral Daily Gregor Bell MD   81 mg at 07/30/18 1430   • bisacodyl (DULCOLAX) EC tablet 5 mg  5 mg Oral Daily PRN Gregor Bell MD       • ceFOXITin (MEFOXIN) 2 g/11.1ml IV PUSH  syringe  2 g Intravenous Q6H Shane Ann MD   2 g at 08/01/18 1938   • dextrose 5 % and sodium chloride 0.45 % infusion  75 mL/hr Intravenous Continuous Shane Ann MD       • dicyclomine (BENTYL) tablet 20 mg  20 mg Oral Q6H PRN Gregor Bell MD       • DOBUTamine (DOBUTREX) 1 mg/mL infusion (ECHO)  10-50 mcg/kg/min Intravenous Continuous Gregor Bell MD   Stopped at 07/29/18 1138   • [START ON 8/2/2018] enoxaparin (LOVENOX) syringe 30 mg  30 mg Subcutaneous Q12H Shane Ann MD       • famotidine (PEPCID) injection 20 mg  20 mg Intravenous Q12H Shane Ann MD        Or   • famotidine (PEPCID) tablet 20 mg  20 mg Oral Q12H Shane Ann MD   20 mg at 08/01/18 1940   • fluticasone (FLONASE) 50 MCG/ACT nasal spray 2 spray  2 spray Nasal Daily Gregor Bell MD   2 spray at 07/31/18 0905   • HYDROcodone-acetaminophen (NORCO) 7.5-325 MG per tablet 1 tablet  1 tablet Oral Q4H PRN Shane Ann MD   1 tablet at 08/01/18 1946   • insulin detemir (LEVEMIR) injection 60 Units  60 Units Subcutaneous Daily Gregor Bell MD   60 Units at 07/30/18 0849   • insulin detemir (LEVEMIR) injection 72 Units  72 Units Subcutaneous Nightly Gregor Bell MD   72 Units at 08/01/18 2121   • insulin lispro (humaLOG) injection 25 Units  25 Units Subcutaneous TID With Meals Gregor Bell MD   25 Units at 08/01/18 1740   • ipratropium-albuterol (DUO-NEB) nebulizer solution 3 mL  3 mL Nebulization Q4H PRN Gabby Fam DO       • isosorbide dinitrate (ISORDIL) tablet 20 mg  20 mg Oral BID - Nitrates Gregor Bell MD   20 mg at 08/01/18 1740   • lamoTRIgine (LaMICtal) tablet 100 mg  100 mg Oral BID Gregor Bell MD   100 mg at 08/01/18 2120   • levETIRAcetam (KEPPRA) tablet 1,500 mg  1,500 mg Oral Daily Gregor Bell MD   1,500 mg at 07/30/18 1430   • levETIRAcetam (KEPPRA) tablet 2,000 mg  2,000 mg  Oral Nightly Gregor Bell MD   2,000 mg at 08/01/18 2120   • Liraglutide (VICTOZA) injection 1.2 mg  1.2 mg Subcutaneous Nightly Gregor Bell MD   1.2 mg at 08/01/18 1941   • lisinopril (PRINIVIL,ZESTRIL) tablet 20 mg  20 mg Oral Nightly Gregor Bell MD   20 mg at 08/01/18 2120   • LORazepam (ATIVAN) tablet 1 mg  1 mg Oral Q6H PRN Shane Ann MD       • magnesium hydroxide (MILK OF MAGNESIA) suspension 2400 mg/10mL 10 mL  10 mL Oral Daily PRN Gregor Bell MD       • methylcellulose (Laxative) (CITRUCEL) tablet 1,000 mg  2 tablet Oral BID Shane Ann MD   1,000 mg at 08/01/18 2120   • metoprolol tartrate (LOPRESSOR) tablet 50 mg  50 mg Oral Q12H Gregor Bell MD   50 mg at 08/01/18 2120   • Morphine sulfate (PF) injection 4 mg  4 mg Intravenous Q4H PRN Gregor Bell MD        And   • naloxone (NARCAN) injection 0.4 mg  0.4 mg Intravenous Q5 Min PRN Gregor Bell MD       • nitroglycerin (NITROSTAT) SL tablet 0.4 mg  0.4 mg Sublingual Q5 Min PRN Celia De La Torre APRN   0.4 mg at 07/29/18 1152   • ondansetron (ZOFRAN) 8 mg in sodium chloride 0.9 % 50 mL IVPB  8 mg Intravenous Q6H PRN Shane Ann MD       • ondansetron (ZOFRAN) injection 4 mg  4 mg Intravenous Q6H PRN Gregor Bell MD       • ondansetron ODT (ZOFRAN-ODT) disintegrating tablet 8 mg  8 mg Oral Q6H PRN Shane Ann MD       • pantoprazole (PROTONIX) EC tablet 40 mg  40 mg Oral BID AC Gregor Bell MD   40 mg at 08/01/18 1740   • polyvinyl alcohol (LIQUIFILM) 1.4 % ophthalmic solution 2 drop  2 drop Both Eyes Q3H PRN Gregor Bell MD       • rosuvastatin (CRESTOR) tablet 20 mg  20 mg Oral Nightly Gregor Bell MD   20 mg at 08/01/18 2120   • sucralfate (CARAFATE) 1 GM/10ML suspension 1 g  1 g Oral Q6H Shane Ann MD       • sucralfate (CARAFATE) tablet 1 g  1 g Oral 4x Daily AC & at Bedtime  "Benitez Mas MD   1 g at 08/01/18 1940   • triamterene-hydrochlorothiazide (MAXZIDE) 75-50 MG per tablet 0.5 tablet  0.5 tablet Oral Daily Gregor Bell MD   0.5 tablet at 07/31/18 0905       Vital Sign Min/Max for last 24 hours  Temp  Min: 97.1 °F (36.2 °C)  Max: 97.9 °F (36.6 °C)   BP  Min: 101/64  Max: 155/109   Pulse  Min: 61  Max: 83   Resp  Min: 12  Max: 20   SpO2  Min: 93 %  Max: 100 %   No Data Recorded   Weight  Min: 110 kg (243 lb 4.8 oz)  Max: 110 kg (243 lb 4.8 oz)     Flowsheet Rows      First Filed Value   Admission Height  182.9 cm (72\") Documented at 07/28/2018 1339   Admission Weight  107 kg (235 lb 12.8 oz) Documented at 07/28/2018 1339          Results for orders placed during the hospital encounter of 07/28/18   Adult Stress Echo W/ Cont or Stress Agent if Necessary Per Protocol    Narrative · Left ventricular systolic function is normal. Estimated EF = 55%.  · Abnormal stress echo with echocardiographic evidence for myocardial   ischemia located in the septal wall and apical wall.          Physical Exam:    General Appearance: Awake, alert, in no acute distress  Eyes: Pupils equal and reactive    Ears: Appear intact with no abnormalities noted  Nose: Nares normal, no drainage  Neck: supple, trachea midline, no carotid bruit and no JVD  Back: no kyphosis present,    Lungs: respirations regular, respirations even and respirations unlabored  Heart: normal S1, S2,  2/6 pansystolic murmur in the left sternal border,  no rub and no click  Abdomen: normal bowel sounds, no masses, no hepatomegaly, no splenomegaly, guarding and no rebound tenderness   Skin: no bleeding, bruising or rash  Extremities: no cyanosis  Psychiatric/Behavioral: Negative for agitation, behavioral problems, confusion, the patient does  appear to be nervous/anxious.          Results Review:   I reviewed the patient's new clinical results.  I reviewed the patient's new imaging results and agree with the " interpretation.  I reviewed the patient's other test results and agree with the interpretation  I personally viewed and interpreted the patient's EKG/Telemetry data  Discussed with patient,    Reviewed available prior notes, consults, prior visits, laboratory findings, radiology and cardiology relevant reports. Updated chart as applicable. I have reviewed the patient's medical history in detail and updated the computerized patient record as relevant.    Updated patient regarding any new or relevant abnormalities on review of records or any new findings on physical exam. Mentioned to patient about purpose of visit and desirable health short and long term goals and objectives.     Assessment/Plan     Principal Problem:    Epigastric pain  Active Problems:    Precordial pain    Chest pain  positive stress test overall in a small apical distribution  Abdominal discomfort with postprandial abdominal pain and some nausea    Plan    Keep nothing by mouth  Continue current medications  Discussed with GI mid-level provider  Acceptable cardiovascular risk of cholecystectomy  Monitor for any signs of volume overload post surgery  After surgery if improved can be  Discharge for outpatient follow-up with me in 6 weeks  Supportive care  Telemetry  Optimal medical therapy  Deep vein thrombosis prophylaxis/precautions  Appropriate diet, fluid, sodium, caffeine, stimulants intake   Compliance to diet and medications   Avoid NSAIDS    Wade Ramey MD  08/01/18  11:25 PM    EMR Dragon/Transcription was used to dictate part of this note

## 2018-08-02 NOTE — DISCHARGE PLACEMENT REQUEST
"Carlo sA Boyer (58 y.o. Male)     Date of Birth Social Security Number Address Home Phone MRN    1960  [address] [place] [address] [address] [place] [id number] [phone] [id number]    Hoahaoism Marital Status          Taoism        Admission Date Admission Type Admitting Provider Attending Provider Department, Room/Bed    7/28/18 Emergency Gabby Fam DO Horn, Frances Marie, DO [facility] 4B, 442/1    Discharge Date Discharge Disposition Discharge Destination         Home or Self Care              Attending Provider:  Gabby Fam DO    Allergies:  Flagyl [Metronidazole], Atorvastatin, Ciprofloxacin    Isolation:  None   Infection:  None   Code Status:  CPR    Ht:  182.9 cm (72\")   Wt:  110 kg (243 lb 4.8 oz)    Admission Cmt:  None   Principal Problem:  Epigastric pain [R10.13] More...                 Active Insurance as of 7/28/2018     Primary Coverage     Payor Plan Insurance Group Employer/Plan Group    MEDICARE MEDICARE A & B      Payor Plan Address Payor Plan Phone Number Effective From Effective To     [id number] [phone] 9/1/2014     [facility] [id number]       Subscriber Name Subscriber Birth Date Member ID       CARLOS A BOYER JRGloria 1960 [id number]           Secondary Coverage     Payor Plan Insurance Group Employer/Plan Group    [employer] [id number]      Payor Plan Address Payor Plan Phone Number Effective From Effective To    [facility] [address] [phone] 1/1/2017     [address] [facility] [address]       Subscriber Name Subscriber Birth Date Member ID       CARLOS A BOYER  1960 [id number]                 Emergency Contacts      (Rel.) Home Phone Work Phone Mobile Phone    Daniela Boyer (Spouse) [phone] -- [phone]               History & Physical      Gregor Bell MD at 7/28/2018  6:30 PM              [facility] Medicine Services  HISTORY AND " "PHYSICAL    Date of Admission: 7/28/2018  Primary Care Physician: GINA Felder    Subjective     Chief Complaint:  Chest pain  History of Present Illness  retrosternally located with nausea, vomiting and diaphoresis and radiation to his [place]; started since 12 noon today; had taken 3 nitroglycerin and baby aspirin.   Still has chest discomfort rated as 3-4/10; he said this level is about normal   [place] it [place]  He has known cad with prior stents; DM, hx of stroke are his cardiovascular risk factor      · Left ventricular ejection fraction is is 63%  · Myocardial perfusion imaging indicates a small-sized area of ischemia located in the lateral wall.  · Impressions are consistent with a low risk study.     Cardiac cath by Dr. Ramey on July 2018 showed  Nonocclusive epicardial coronary arteries with widely patent stents in the left anterior descent coronary artery and left circumflex coronary artery.  Hyperdynamic left ventricle with ejection fraction of 75%.  LVEDP   17    mm Hg     Plan  Workup for noncardiac causes of chest pain this can be done as outpatient.  His d-dimer is within normal range  After a period of observation of stable can be discharged either later today.  Keep follow-up appointment with me  Ongoing risk factor modifications keep LDL below 70 mg/dL  Hydration   Observation       Review of Systems   Lightheadedness; no syncope or near syncope  + sob, occ palpitation (skips a beat once in a while)  + diarrhea (chronic about 3-4 yr); he was here in ER 1 wk ago to check for 'blockage\"  Reflux   Right sided weakness from prior stroke; reports seizure 6 months ago - prob partial w/o loss of consciousness  Otherwise complete ROS reviewed and negative except as mentioned in the HPI.    Past Medical History:   Past Medical History:   Diagnosis Date   • Arthritis    • Asthma    • Carotid disease, bilateral (CMS/HCC)    • Chest pain    • Chronic ischemic heart disease  "   • Chronic sinusitis    • Kathia bullosa    • Coronary artery disease    • Deviated septum    • Diabetes mellitus (CMS/HCC)    • Difficulty urinating    • Diverticulitis    • Enlarged prostate    • Fatty liver    • GERD (gastroesophageal reflux disease)    • Hyperlipidemia    • Hypertension    • Hypertrophy of nasal turbinates    • Keratoderma    • Kidney stone    • Migraine    • Murmur, heart    • Myocardial infarction    • Obesity    • PONV (postoperative nausea and vomiting)    • Psoriasis    • Seizures (CMS/HCC)    • Sinus congestion    • Sleep apnea - uses cpap    • SOB (shortness of breath)    • Stroke (CMS/HCC)      Past Surgical History:  Past Surgical History:   Procedure Laterality Date   • CARDIAC CATHETERIZATION  01/2016    Dr. Broadbent; widely patent previously placed stents in the left anterior descending and obstructive disease involving the diagonal branch which was treated medically   • CARDIAC CATHETERIZATION N/A 7/14/2017    Procedure: Left Heart Cath;  Surgeon: Wade Ramey MD;  Location:  [facility]ATION;  Service:    • CORONARY ANGIOPLASTY     • CORONARY STENT PLACEMENT      x 6   • ENDOSCOPIC FUNCTIONAL SINUS SURGERY (FESS) Bilateral 12/13/2017    Procedure: PROCEDURE PERFORMED:  Bilateral functional endoscopic anterior ethmoidectomy with bilateral middle meatal antrostomy Septoplasty Right kathia bullosa resection Bilateral inferior turbinate reduction via Coblation;  Surgeon: Mayank Ibarra MD;  Location: [facility] OR;  Service:    • HERNIA REPAIR      x2 inguinal area   • KIDNEY STONE SURGERY     • KNEE SURGERY Right    • OTHER SURGICAL HISTORY      urolift   • PROSTATE SURGERY      Dr. Badillo - 2017   • THUMB AMPUTATION Left     partial   • TOE AMPUTATION Right     big     Social History:  reports that he quit smoking about 25 years ago. His smoking use included Cigarettes. He quit after 4.50 years of use. He quit smokeless tobacco use about 9 years ago. His smokeless  tobacco use included Chew. He reports that he does not drink alcohol or use drugs.  29yrs  to wife Daniela  Family History: family history includes Heart disease in his father; passed away at age 47; No Known Problems in his mother.   Allergies:  Allergies   Allergen Reactions   • Flagyl [Metronidazole] Hives   • Atorvastatin Other (See Comments)     Muscle cramps   • Ciprofloxacin Hives     Medications:  Prior to Admission medications    Medication Sig Start Date End Date Taking? Authorizing Provider   albuterol (PROVENTIL HFA;VENTOLIN HFA) 108 (90 BASE) MCG/ACT inhaler Inhale 2 puffs Every 6 (Six) Hours As Needed for wheezing.    Historical Provider, MD   aspirin 81 MG chewable tablet Chew 81 mg Daily.    Historical Provider, MD   carboxymethylcellulose (REFRESH PLUS) 0.5 % solution Administer 1 drop to both eyes 4 (Four) Times a Day As Needed for Dry Eyes.    Historical Provider, MD   dicyclomine (BENTYL) 20 MG tablet Take 1 tablet by mouth Every 6 (Six) Hours As Needed (abdominal pain). 7/22/18   Gonzales Rouse,    fluticasone (FLONASE) 50 MCG/ACT nasal spray 2 sprays into each nostril Daily.    Historical Provider, MD   insulin aspart (novoLOG FLEXPEN) 100 UNIT/ML solution pen-injector sc pen Inject 25 Units under the skin 3 (Three) Times a Day Before Meals.    Historical Provider, MD   Insulin Glargine (LANTUS SOLOSTAR) 100 UNIT/ML injection pen Inject  under the skin 2 (Two) Times a Day. 72 units @ night  60 units in morning    Historical Provider, MD   isosorbide dinitrate (ISORDIL) 20 MG tablet Take 1 tablet by mouth 2 (Two) Times a Day. 10/18/16   TEENA Garcia   lamoTRIgine (LaMICtal) 200 MG tablet Take 1 tablet by mouth 2 (Two) Times a Day.  Patient taking differently: Take 100 mg by mouth 2 (Two) Times a Day. 3/27/18   TEENA Castillo   levETIRAcetam (KEPPRA) 500 MG tablet Take 3 tablets by mouth Daily. 3/27/18   TEENA Castillo   levETIRAcetam (KEPPRA) 500 MG tablet Take  "4 tablets by mouth Every Night. 3/27/18   TEENA Castillo   Liraglutide (VICTOZA SC) Inject 1.6 Units under the skin Daily.    Historical Provider, MD   lisinopril (PRINIVIL,ZESTRIL) 40 MG tablet Take 20 mg by mouth Every Night.    Historical Provider, MD   metFORMIN (GLUCOPHAGE) 1000 MG tablet Take 1 tablet by mouth 2 (Two) Times a Day With Meals. HOLD x 48 hours post contrast. May resume 3/18/18 3/16/18   TEENA Puri   metoprolol tartrate (LOPRESSOR) 100 MG tablet Take 50 mg by mouth Every 12 (Twelve) Hours.    Historical Provider, MD   Multiple Vitamin (MULTI VITAMIN PO) Take 1 tablet by mouth Daily.    Historical Provider, MD   naproxen sodium (ALEVE) 220 MG tablet Take 220 mg by mouth Daily As Needed for Headache.    Historical Provider, MD   nitroglycerin (NITROSTAT) 0.4 MG SL tablet Place 0.4 mg under the tongue Every 5 (Five) Minutes As Needed for chest pain. Take no more than 3 doses in 15 minutes.    Historical Provider, MD   ondansetron ODT (ZOFRAN-ODT) 4 MG disintegrating tablet Take 1 tablet by mouth Every 6 (Six) Hours As Needed for Nausea or Vomiting. 7/22/18   Gonzales Rouse,    pantoprazole (PROTONIX) 40 MG EC tablet Take 40 mg by mouth 2 (Two) Times a Day.    Historical Provider, MD   psyllium (METAMUCIL) 58.6 % packet Take 1 packet by mouth Daily As Needed (constipation).    Historical Provider, MD   rosuvastatin (CRESTOR) 40 MG tablet Take 20 mg by mouth Every Night.    Historical Provider, MD   triamterene-hydrochlorothiazide (MAXZIDE) 75-50 MG per tablet Take 0.5 tablets by mouth Daily.    Historical Provider, MD   urea (CARMOL) 40 % ointment Apply 1 application topically Daily As Needed for Dry Skin. Apply to heels.    Historical Provider, MD     Objective     Vital Signs: /83   Pulse 81   Temp 97.8 °F (36.6 °C)   Resp 17   Ht 182.9 cm (72\")   Wt 107 kg (235 lb 12.8 oz)   SpO2 97%   BMI 31.98 kg/m²    Physical Exam   Constitutional: He appears well-developed " and well-nourished. No distress.   bmi 32   HENT:   Head: Normocephalic and atraumatic.   Right Ear: External ear normal.   Left Ear: External ear normal.   Nose: Nose normal.   Mouth/Throat: Oropharynx is clear and moist.   Narrow post pharyngeal wall and short neck   Eyes: Pupils are equal, round, and reactive to light. Conjunctivae and EOM are normal. Right eye exhibits no discharge. Left eye exhibits no discharge.   Neck: Normal range of motion. Neck supple. No JVD present. No tracheal deviation present. No thyromegaly present.   Cardiovascular: Normal rate and regular rhythm.    No murmur heard.  Chest wall tenderness   Pulmonary/Chest: Effort normal and breath sounds normal. No respiratory distress. He has no wheezes. He has no rales.   Abdominal: Soft. Bowel sounds are normal. He exhibits no mass. There is no tenderness. There is no rebound and no guarding.   Obese protuberant with everted umbilicus   Musculoskeletal: Normal range of motion. He exhibits no edema or tenderness.   Lymphadenopathy:     He has no cervical adenopathy.   Neurological: He is alert. He exhibits normal muscle tone. Coordination normal.   Skin: Skin is warm and dry. Capillary refill takes less than 2 seconds. No rash noted. He is not diaphoretic. No erythema. No pallor.   Psychiatric: He has a normal mood and affect. His behavior is normal. Judgment and thought content normal.   Vitals reviewed.          Results Reviewed:  Lab Results (last 24 hours)     Procedure Component Value Units Date/Time    Troponin [[id number]]  (Normal) Collected:  07/28/18 1645    Specimen:  Blood from Arm, Right Updated:  07/28/18 1722     Troponin I 0.019 ng/mL     Troponin [[id number]]  (Normal) Collected:  07/28/18 1343    Specimen:  Blood from Arm, Right Updated:  07/28/18 1416     Troponin I <0.012 ng/mL     Comprehensive Metabolic Panel [[id number]]  (Abnormal) Collected:  07/28/18 1343    Specimen:  Blood from Arm, Right Updated:  07/28/18 1414      Glucose 257 (H) mg/dL      BUN 22 (H) mg/dL      Creatinine 1.37 mg/dL      Sodium 140 mmol/L      Potassium 4.7 mmol/L      Chloride 103 mmol/L      CO2 22.0 (L) mmol/L      Calcium 10.0 mg/dL      Total Protein 7.9 g/dL      Albumin 4.80 g/dL      ALT (SGPT) 35 U/L      AST (SGOT) 36 U/L      Alkaline Phosphatase 95 U/L      Total Bilirubin 0.7 mg/dL      eGFR Non African Amer 53 (L) mL/min/1.73      Globulin 3.1 gm/dL      A/G Ratio 1.5 g/dL      BUN/Creatinine Ratio 16.1     Anion Gap 15.0 (H) mmol/L     Protime-INR [[id number]]  (Abnormal) Collected:  07/28/18 1343    Specimen:  Blood from Arm, Right Updated:  07/28/18 1412     Protime 12.3 Seconds      INR 0.89 (L)    aPTT [[id number]]  (Normal) Collected:  07/28/18 1343    Specimen:  Blood from Arm, Right Updated:  07/28/18 1412     PTT 25.4 seconds     Lipase [[id number]]  (Normal) Collected:  07/28/18 1343    Specimen:  Blood from Arm, Right Updated:  07/28/18 1411     Lipase 92 U/L     CBC & Differential [[id number]] Collected:  07/28/18 1343    Specimen:  Blood Updated:  07/28/18 1407    Narrative:       The following orders were created for panel order CBC & Differential.  Procedure                               Abnormality         Status                     ---------                               -----------         ------                     CBC Auto Differential[[id number]]        Abnormal            Final result                 Please view results for these tests on the individual orders.    CBC Auto Differential [[id number]]  (Abnormal) Collected:  07/28/18 1343    Specimen:  Blood from Arm, Right Updated:  07/28/18 1407     WBC 6.44 10*3/mm3      RBC 5.12 10*6/mm3      Hemoglobin 15.1 g/dL      Hematocrit 44.2 %      MCV 86.3 fL      MCH 29.5 pg      MCHC 34.2 g/dL      RDW 12.5 %      RDW-SD 39.5 (L) fl      MPV 11.9 fL      Platelets 148 10*3/mm3      Neutrophil % 54.7 %      Lymphocyte % 30.7 %      Monocyte % 8.1 %      Eosinophil % 5.7 (H) %       Basophil % 0.5 %      Immature Grans % 0.3 %      Neutrophils, Absolute 3.52 10*3/mm3      Lymphocytes, Absolute 1.98 10*3/mm3      Monocytes, Absolute 0.52 10*3/mm3      Eosinophils, Absolute 0.37 10*3/mm3      Basophils, Absolute 0.03 10*3/mm3      Immature Grans, Absolute 0.02 10*3/mm3      nRBC 0.0 /100 WBC         Imaging Results (last 24 hours)     Procedure Component Value Units Date/Time    XR Chest 1 View [[id number]] Collected:  07/28/18 1432     Updated:  07/28/18 1435    Narrative:       XR CHEST 1 VW- 7/28/2018 2:23 PM CDT     HISTORY: chest pain       COMPARISON: 3/15/2018.     FINDINGS:   The lungs are clear. The cardiomediastinal silhouette and pulmonary  vascularity are unchanged.      The osseous structures and surrounding soft tissues demonstrate no acute  abnormality.       Impression:       1. No radiographic evidence of acute cardiopulmonary process.        This report was finalized on 07/28/2018 14:32 by Dr. Delio Langston MD.        I have personally reviewed and interpreted the radiology studies and ECG obtained at time of admission.     Assessment / Plan     Assessment:   Hospital Problem List     Chest pain        Chest pain prob musculoskeletal but with multiple cardiovascular disease I felt prudent to further evaluate to increase certainty   Hx of GERD  Obesity BMI 32  HOA  DMT2 insulin treated  ? Irritable bowel   Nausea/vomiting   Plan:   serial markers  ekg reviewed no acute sttwave changes similar to may and March 2018 ekg (NSR q wave in v2 and v3; Lead 3 and AVF  I instructed the wife to bring cpap to use  Cont present management  Stress test in am  supportive care      Code Status: full code   I discussed the patient's findings and my recommendations with the wife, patient and nurse    Estimated length of stay overnight     Gregor Bell MD   07/28/18   6:30 PM              Electronically signed by Gregor Bell MD at 7/28/2018  7:06 PM          Discharge  Summary      Laura De La Torre APRN at 8/2/2018 10:56 AM              [facility] Medicine Services  DISCHARGE SUMMARY       Date of Admission: 7/28/2018  Date of Discharge:  8/2/2018  Primary Care Physician: Vianyak Correia PA    Presenting Problem/History of Present Illness:  Chest pain, unspecified type [R07.9]  Chest pain, unspecified type [R07.9]  Chest pain, unspecified type [R07.9]     Final Discharge Diagnoses:  1.  Chest pain-positive dobutamine stress echo  2.  Biliary dyskinesia with gallbladder EF 2% on HIDA scan  3.  Gastroesophageal reflux disease without esophagitis  4.  Type II Diabetes Mellitus-insulin dependent A1C 7.2%  5.  Obesity BMI 32  6.  Obstructive sleep apnea  7.  Mild acute kidney injury- improved  8.  Nausea/vomiting   9.  Irritable bowel syndrome  10.  Thrombocytopenia- mild    Consults:   1.  Dr. Wade Ramey-cardiology   2.  Dr. Benitez Mas- gastroenterology  3.  Dr. Shane Ann-general surgery    Procedures Performed:   1.  07/30/2018-upper GI endoscopy  2.  08/01/2018-laparoscopic cholecystectomy with intraoperative cholangiogram    Pertinent Test Results:   Lab Results (all)     Procedure Component Value Units Date/Time    POC Glucose Once [[id number]]  (Abnormal) Collected:  08/02/18 0752    Specimen:  Blood Updated:  08/02/18 0835     Glucose 243 (H) mg/dL      Comment: : [id number] Kp LisaMeter ID: [id number]       Tissue Pathology Exam [[id number]] Collected:  08/01/18 1420    Specimen:  Tissue from Gallbladder Updated:  08/02/18 0748    Comprehensive Metabolic Panel [[id number]] Updated:  08/02/18 0559    Specimen:  Blood     CBC (No Diff) [[id number]]  (Abnormal) Collected:  08/02/18 0518    Specimen:  Blood Updated:  08/02/18 0536     WBC 7.04 10*3/mm3      RBC 4.53 (L) 10*6/mm3      Hemoglobin 13.6 (L) g/dL      Hematocrit 39.4 (L) %      MCV 87.0 fL      MCH 30.0 pg      MCHC 34.5 g/dL      RDW 12.1 %      RDW-SD 38.9 (L) fl       MPV 12.0 fL      Platelets 108 (L) 10*3/mm3     POC Glucose Once [[id number]]  (Abnormal) Collected:  08/01/18 2041    Specimen:  Blood Updated:  08/01/18 2056     Glucose 207 (H) mg/dL      Comment: : [id number] English SethMeter ID: [id number]       POC Glucose Once [[id number]]  (Abnormal) Collected:  08/01/18 1624    Specimen:  Blood Updated:  08/01/18 1643     Glucose 237 (H) mg/dL      Comment: : [id number] Helena KaleeMeter ID: [id number]       POC Glucose Once [[id number]]  (Abnormal) Collected:  08/01/18 1501    Specimen:  Blood Updated:  08/01/18 1513     Glucose 178 (H) mg/dL      Comment: : [id number] Rachna Tiberiumeter ID: [id number]       POC Glucose Once [[id number]]  (Abnormal) Collected:  08/01/18 1201    Specimen:  Blood Updated:  08/01/18 1212     Glucose 137 (H) mg/dL      Comment: : [id number] Helena Playcast MediaeeMeter ID: [id number]       POC Glucose Once [[id number]]  (Abnormal) Collected:  08/01/18 0758    Specimen:  Blood Updated:  08/01/18 0809     Glucose 198 (H) mg/dL      Comment: : [id number] PataFoodseeMeter ID: [id number]       Basic Metabolic Panel [[id number]]  (Abnormal) Collected:  08/01/18 0631    Specimen:  Blood Updated:  08/01/18 0725     Glucose 190 (H) mg/dL      BUN 18 mg/dL      Creatinine 0.95 mg/dL      Sodium 139 mmol/L      Potassium 4.7 mmol/L      Chloride 103 mmol/L      CO2 22.0 (L) mmol/L      Calcium 9.7 mg/dL      eGFR Non African Amer 81 mL/min/1.73      BUN/Creatinine Ratio 18.9     Anion Gap 14.0 (H) mmol/L     Narrative:       GFR Normal >60  Chronic Kidney Disease <60  Kidney Failure <15    CBC (No Diff) [[id number]]  (Abnormal) Collected:  08/01/18 0355    Specimen:  Blood Updated:  08/01/18 0451     WBC 5.09 10*3/mm3      RBC 4.76 (L) 10*6/mm3      Hemoglobin 14.2 g/dL      Hematocrit 41.5 %      MCV 87.2 fL      MCH 29.8 pg      MCHC 34.2 g/dL      RDW 12.3 %      RDW-SD 39.7 (L) fl      MPV 11.4 fL      Platelets 116 (L) 10*3/mm3      POC Glucose Once [[id number]]  (Abnormal) Collected:  07/31/18 2000    Specimen:  Blood Updated:  07/31/18 2010     Glucose 190 (H) mg/dL      Comment: : [id number] Rosetta HareMeter ID: [id number]       POC Glucose Once [[id number]]  (Normal) Collected:  07/31/18 1603    Specimen:  Blood Updated:  07/31/18 1614     Glucose 103 mg/dL      Comment: : [id number] Helena AviacodeeeMeter ID: [id number]       Urease For H Pylori - Tissue, Stomach [[id number]]  (Normal) Collected:  07/30/18 1311    Specimen:  Tissue from Stomach Updated:  07/31/18 1341     Urease Negative    POC Glucose Once [[id number]]  (Abnormal) Collected:  07/31/18 0743    Specimen:  Blood Updated:  07/31/18 0754     Glucose 187 (H) mg/dL      Comment: : [id number] Helena LinnMeter ID: [id number]       CBC Auto Differential [[id number]]  (Abnormal) Collected:  07/31/18 0457    Specimen:  Blood Updated:  07/31/18 0557     WBC 5.83 10*3/mm3      RBC 4.87 10*6/mm3      Hemoglobin 14.2 g/dL      Hematocrit 42.7 %      MCV 87.7 fL      MCH 29.2 pg      MCHC 33.3 g/dL      RDW 12.5 %      RDW-SD 40.0 fl      MPV 11.7 fL      Platelets 123 (L) 10*3/mm3      Neutrophil % 45.8 %      Lymphocyte % 34.5 %      Monocyte % 9.9 %      Eosinophil % 8.6 (H) %      Basophil % 0.9 %      Neutrophils, Absolute 2.67 10*3/mm3      Lymphocytes, Absolute 2.01 10*3/mm3      Monocytes, Absolute 0.58 10*3/mm3      Eosinophils, Absolute 0.50 10*3/mm3      Basophils, Absolute 0.05 10*3/mm3     Basic Metabolic Panel [[id number]]  (Abnormal) Collected:  07/31/18 0457    Specimen:  Blood Updated:  07/31/18 0556     Glucose 196 (H) mg/dL      BUN 21 mg/dL      Creatinine 1.07 mg/dL      Sodium 138 mmol/L      Potassium 4.6 mmol/L      Chloride 101 mmol/L      CO2 24.0 mmol/L      Calcium 9.6 mg/dL      eGFR Non African Amer 70 mL/min/1.73      BUN/Creatinine Ratio 19.6     Anion Gap 13.0 mmol/L     POC Glucose Once [[id number]]  (Abnormal) Collected:  07/30/18 2001     Specimen:  Blood Updated:  07/30/18 2012     Glucose 179 (H) mg/dL      Comment: : [id number] Rosetta AshleyMeter ID: [id number]       POC Glucose Once [[id number]]  (Abnormal) Collected:  07/30/18 1534    Specimen:  Blood Updated:  07/30/18 1620     Glucose 163 (H) mg/dL      Comment: : [id number] Marlon EuraisaMeter ID: [id number]       Basic Metabolic Panel [[id number]]  (Abnormal) Collected:  07/30/18 0424    Specimen:  Blood Updated:  07/30/18 0921     Glucose 186 (H) mg/dL      BUN 23 (H) mg/dL      Creatinine 1.12 mg/dL      Sodium 140 mmol/L      Potassium 4.7 mmol/L      Chloride 102 mmol/L      CO2 25.0 mmol/L      Calcium 10.0 mg/dL      eGFR Non African Amer 67 mL/min/1.73      BUN/Creatinine Ratio 20.5     Anion Gap 13.0 mmol/L     POC Glucose Once [[id number]]  (Abnormal) Collected:  07/30/18 0826    Specimen:  Blood Updated:  07/30/18 0847     Glucose 161 (H) mg/dL      Comment: : [id number] Marlon EuraisaMeter ID: [id number]       TSH [[id number]]  (Normal) Collected:  07/30/18 0424    Specimen:  Blood Updated:  07/30/18 0542     TSH 1.590 mIU/mL     POC Glucose Once [[id number]]  (Abnormal) Collected:  07/29/18 1951    Specimen:  Blood Updated:  07/29/18 2002     Glucose 226 (H) mg/dL      Comment: : [id number] Rosetta AshleyMeter ID: [id number]       POC Glucose Once [[id number]]  (Abnormal) Collected:  07/29/18 1641    Specimen:  Blood Updated:  07/29/18 1652     Glucose 132 (H) mg/dL      Comment: : [id number] Marlon EuraisaMeter ID: [id number]       Basic Metabolic Panel [[id number]]  (Abnormal) Collected:  07/29/18 0356    Specimen:  Blood Updated:  07/29/18 1350     Glucose 188 (H) mg/dL      BUN 21 mg/dL      Creatinine 1.29 mg/dL      Sodium 141 mmol/L      Potassium 3.8 mmol/L      Chloride 101 mmol/L      CO2 25.0 mmol/L      Calcium 9.7 mg/dL      eGFR Non African Amer 57 (L) mL/min/1.73      BUN/Creatinine Ratio 16.3     Anion Gap 15.0 (H) mmol/L     POC Glucose Once  [[id number]]  (Abnormal) Collected:  07/29/18 0757    Specimen:  Blood Updated:  07/29/18 0832     Glucose 180 (H) mg/dL      Comment: : [id number] Marlon EuraisaMeter ID: [id number]       POC Glucose Once [[id number]]  (Abnormal) Collected:  07/29/18 0552    Specimen:  Blood Updated:  07/29/18 0604     Glucose 264 (H) mg/dL      Comment: : [id number] Crissy BlairMeter ID: [id number]       Hemoglobin A1c [[id number]] Collected:  07/29/18 0355    Specimen:  Blood Updated:  07/29/18 0456     Hemoglobin A1C 7.2 %     Lipid Panel [[id number]]  (Abnormal) Collected:  07/29/18 0356    Specimen:  Blood Updated:  07/29/18 0424     Total Cholesterol 167 mg/dL      Triglycerides 409 (H) mg/dL      HDL Cholesterol 32 (L) mg/dL      LDL Cholesterol  89 mg/dL      LDL/HDL Ratio --     Comment: Unable to calculate       Troponin [[id number]]  (Normal) Collected:  07/28/18 2328    Specimen:  Blood Updated:  07/29/18 0019     Troponin I <0.012 ng/mL     POC Glucose Once [[id number]]  (Abnormal) Collected:  07/28/18 1935    Specimen:  Blood Updated:  07/28/18 1947     Glucose 133 (H) mg/dL      Comment: : [id number] Rosetta AshleyMeter ID: [id number]       Troponin [[id number]]  (Normal) Collected:  07/28/18 1645    Specimen:  Blood from Arm, Right Updated:  07/28/18 1722     Troponin I 0.019 ng/mL      Comment: Auto resulted.       Troponin [[id number]]  (Normal) Collected:  07/28/18 1343    Specimen:  Blood from Arm, Right Updated:  07/28/18 1416     Troponin I <0.012 ng/mL     Comprehensive Metabolic Panel [[id number]]  (Abnormal) Collected:  07/28/18 1343    Specimen:  Blood from Arm, Right Updated:  07/28/18 1414     Glucose 257 (H) mg/dL      BUN 22 (H) mg/dL      Creatinine 1.37 mg/dL      Sodium 140 mmol/L      Potassium 4.7 mmol/L      Chloride 103 mmol/L      CO2 22.0 (L) mmol/L      Calcium 10.0 mg/dL      Total Protein 7.9 g/dL      Albumin 4.80 g/dL      ALT (SGPT) 35 U/L      AST (SGOT) 36 U/L      Alkaline  Phosphatase 95 U/L      Total Bilirubin 0.7 mg/dL      eGFR Non African Amer 53 (L) mL/min/1.73      Globulin 3.1 gm/dL      A/G Ratio 1.5 g/dL      BUN/Creatinine Ratio 16.1     Anion Gap 15.0 (H) mmol/L     Protime-INR [[id number]]  (Abnormal) Collected:  07/28/18 1343    Specimen:  Blood from Arm, Right Updated:  07/28/18 1412     Protime 12.3 Seconds      INR 0.89 (L)    aPTT [[id number]]  (Normal) Collected:  07/28/18 1343    Specimen:  Blood from Arm, Right Updated:  07/28/18 1412     PTT 25.4 seconds     Lipase [[id number]]  (Normal) Collected:  07/28/18 1343    Specimen:  Blood from Arm, Right Updated:  07/28/18 1411     Lipase 92 U/L     CBC Auto Differential [[id number]]  (Abnormal) Collected:  07/28/18 1343    Specimen:  Blood from Arm, Right Updated:  07/28/18 1407     WBC 6.44 10*3/mm3      RBC 5.12 10*6/mm3      Hemoglobin 15.1 g/dL      Hematocrit 44.2 %      MCV 86.3 fL      MCH 29.5 pg      MCHC 34.2 g/dL      RDW 12.5 %      RDW-SD 39.5 (L) fl      MPV 11.9 fL      Platelets 148 10*3/mm3      Neutrophil % 54.7 %      Lymphocyte % 30.7 %      Monocyte % 8.1 %      Eosinophil % 5.7 (H) %      Basophil % 0.5 %      Immature Grans % 0.3 %      Neutrophils, Absolute 3.52 10*3/mm3      Lymphocytes, Absolute 1.98 10*3/mm3      Monocytes, Absolute 0.52 10*3/mm3      Eosinophils, Absolute 0.37 10*3/mm3      Basophils, Absolute 0.03 10*3/mm3      Immature Grans, Absolute 0.02 10*3/mm3      nRBC 0.0 /100 WBC         Imaging Results (all)     Procedure Component Value Units Date/Time    FL Cholangiogram Operative [[id number]] Collected:  08/01/18 1455     Updated:  08/02/18 0759    Narrative:       FL CHOLANGIOGRAM OPERATIVE- 8/1/2018 2:05 PM CDT     HISTORY: stones; R07.9-Chest pain, unspecified; R10.13-Epigastric pain;  R07.2-Precordial pain     COMPARISON: None     FLUOROSCOPY TIME: 14 seconds     NUMBER OF IMAGES: 8       Impression:       Contrast was injected through the cystic duct stump, and  multiple  fluoroscopic images of the RIGHT upper quadrant were obtained  intraoperatively. The opacified intrahepatic and extrahepatic ducts  demonstrate no filling defects or obstruction. Contrast is noted in the  small bowel. Surgical instruments and clips are noted in the RIGHT upper  quadrant.     Please refer to the operative note for more details.  This report was finalized on 08/01/2018 14:56 by Dr Merritt Reaves, .    FL C Arm During Surgery [[id number]] Collected:  08/01/18 1455     Updated:  08/02/18 0759    Narrative:       FL CHOLANGIOGRAM OPERATIVE- 8/1/2018 2:05 PM CDT     HISTORY: stones; R07.9-Chest pain, unspecified; R10.13-Epigastric pain;  R07.2-Precordial pain     COMPARISON: None     FLUOROSCOPY TIME: 14 seconds     NUMBER OF IMAGES: 8       Impression:       Contrast was injected through the cystic duct stump, and multiple  fluoroscopic images of the RIGHT upper quadrant were obtained  intraoperatively. The opacified intrahepatic and extrahepatic ducts  demonstrate no filling defects or obstruction. Contrast is noted in the  small bowel. Surgical instruments and clips are noted in the RIGHT upper  quadrant.     Please refer to the operative note for more details.  This report was finalized on 08/01/2018 14:56 by Dr Merritt Reaves, .    NM Hepatobiliary Without CCK [[id number]] Collected:  07/31/18 1323     Updated:  07/31/18 1522    Narrative:       EXAM: NM HEPATOBILIARY WITHOUT CCK- - 7/31/2018 10:45 AM CDT     HISTORY: RUQ pain, cholecystitis suspected; R07.9-Chest pain,  unspecified; R10.13-Epigastric pain; R07.2-Precordial pain       COMPARISON: 7/30/2018.      TECHNIQUE:    5.3 mCi of Tc-99m Mebrofenin was administered intravenously and serial  anterior planar images over the liver were obtained. 30 mL corn oil  emulsion was administered orally with subsequent imaging per protocol.     FINDINGS:   Homogenous activity throughout the liver with good clearance of  background activity. This  indicates good hepatocellular function.      Biliary tree activity is seen at 10 minutes. The gallbladder is seen at  15 minutes. Bowel activity is seen at 25 minutes.     The computed gallbladder ejection fraction is 2 percent. (normal  >20  percent).           Impression:       1. Low gallbladder ejection fraction, which can be due to chronic  cholecystitis.     This examination was initially reviewed and dictated by Dr. Mari Melton.     Exam additionally reviewed by Dr. Merritt Reaves. Agree with the stated  findings and impression.  This report was finalized on 07/31/2018 15:19 by Dr Merritt Reaves, .    US Gallbladder [[id number]] Collected:  07/30/18 1419     Updated:  07/30/18 1423    Narrative:       EXAMINATION:   US GALLBLADDER-  7/30/2018 2:19 PM CDT     HISTORY: ULTRASOUND GALLBLADDER 7/30/2018 1:37 PM CDT     REASON FOR EXAM: nausea, epi pain; R07.9-Chest pain, unspecified;  R10.13-Epigastric pain       COMPARISON: NONE      TECHNIQUE: Multiple longitudinal and transverse realtime sonographic  images of the right upper quadrant of the abdomen are obtained.      FINDINGS:    Pancreas: Normal in size and echogenicity.      Liver: Liver is mildly echogenic. This represents hepatic steatosis..      Gallbladder: No intraluminal echoes, wall thickening, or pericholecystic  fluid.      Bile ducts: The CBD measures 0.4 cm in diameter. There is no  intrahepatic or extrahepatic ductal dilation.      Right kidney: Normal in size, shape and contour. No mass, hydronephrosis  or calculi. No perinephric fluid collection.       Other: The visualized abdominal aorta is normal in caliber.        Impression:       Mildly echogenic liver representing mild hepatic steatosis        This report was finalized on 07/30/2018 14:20 by Dr. Maco Akbar MD.    XR Chest 1 View [[id number]] Collected:  07/28/18 1432     Updated:  07/28/18 1435    Narrative:       XR CHEST 1 VW- 7/28/2018 2:23 PM CDT     HISTORY: chest pain        COMPARISON: 3/15/2018.     FINDINGS:   The lungs are clear. The cardiomediastinal silhouette and pulmonary  vascularity are unchanged.      The osseous structures and surrounding soft tissues demonstrate no acute  abnormality.       Impression:       1. No radiographic evidence of acute cardiopulmonary process.        This report was finalized on 07/28/2018 14:32 by Dr. Delio Langston MD.        Chief Complaint on Day of Discharge: Abdominal pain    History of Present Illness on Day of Discharge: The patient is sitting up on the side of the bed.  He tells me he feels well today except for some abdominal pain secondary to surgery.  He has started been up and walking and running.  He is tolerating a diet without difficulty.  He denies any chest pain or shortness of breath worse than his baseline.    Hospital Course:  Mr. Boeyr is a 58-year-old  male who follows Vinayak Correia PA-C for primary care.  He has a past medical history significant for coronary artery disease, diabetes mellitus type II, gastroesophageal reflux disease, essential hypertension, hyperlipidemia, migraine, seizure disorder, CVA, and sleep apnea.  She presented to the [facility] emergency department on 07/28/2018 with complaints of retrosternally located chest pain [place] the mall.  This was associated with nausea, vomiting, and diaphoresis with radiation to the left shoulder.  The patient had taken 3 nitroglycerin and a baby aspirin prior to presentation.  In the emergency department, initial troponin value was noted to be negative.  Other lab values were essentially within normal limits.  Chest x-ray no evidence of acute cardiopulmonary process.  The patient was admitted to the hospitalist service for further evaluation and management.    Troponin values were trended and noted to be negative subsequently.  Patient was sent for dobutamine stress echocardiogram on the day following admission.  This was  "interpreted as abnormal exam with echocardiographic evidence for myocardial ischemia located in the septal wall and apical wall.  It was at this point cardiology was consult.  The patient was seen by Dr. Ramey.  Patient did have a heart cath in July 2017 which showed nonocclusive coronary arteries with widely patent stents.  Dr. Ramey did recommend a repeat heart cath after a GI evaluation.  The patient was taken for upper GI endoscopy on 07/30/2018.  This was a normal endoscopy, and a gallbladder ultrasound was then ordered.  Findings showed no intraluminal echoes, wall thickening, or pericholecystic fluid, but did show a mildly echogenic liver representing mild hepatic steatosis.  HIDA scan was performed which showed a gallbladder ejection fraction of 2%.  General surgery was consulted the patient was taken for laparoscopic cholecystectomy on 08/01/2018.  Dr. Ramey feels that the majority of the patient's presenting symptoms were secondary to his biliary dyskinesia, and he will follow up with him in the next 6 weeks with instructions to call sooner for worsening shortness of breath or chest pain.  The patient is overall hemodynamically stable and appropriate for discharge home today.  He will need follow-up with his primary care provider in 1 week.  He will need to follow-up with Dr. Ann in 1 week.    Condition on Discharge:  Stable    Physical Exam on Discharge:  /66 (BP Location: Right arm, Patient Position: Sitting)   Pulse 69   Temp 96.9 °F (36.1 °C) (Temporal Artery )   Resp 18   Ht 182.9 cm (72\")   Wt 110 kg (243 lb 4.8 oz)   SpO2 97%   BMI 33.00 kg/m²    Physical Exam   Constitutional: He is oriented to person, place, and time. He appears well-developed and well-nourished. No distress.   Obese body habitus   HENT:   Head: Normocephalic and atraumatic.   Neck: Normal range of motion. Neck supple. No JVD present. No tracheal deviation present. No thyromegaly present.   Cardiovascular: Normal " rate, regular rhythm, normal heart sounds and intact distal pulses.  Exam reveals no gallop and no friction rub.    No murmur heard.  Normal sinus rhythm 76-86 with first-degree   Pulmonary/Chest: Effort normal and breath sounds normal. He has no wheezes. He has no rales.   Abdominal: Bowel sounds are normal. He exhibits distension. There is tenderness. There is no guarding.   Musculoskeletal: Normal range of motion. He exhibits no edema or tenderness.   Lymphadenopathy:     He has no cervical adenopathy.   Neurological: He is alert and oriented to person, place, and time. No cranial nerve deficit.   Skin: Skin is warm and dry. No erythema. No pallor.   Dry scaly skin.  Laparoscopic incision ×4 to abdomen with Tegaderm clean/dry/intact.   Psychiatric: He has a normal mood and affect. His behavior is normal. Judgment and thought content normal.   Vitals reviewed.    Discharge Disposition:  Home or Self Care    Discharge Medications:     Discharge Medications      New Medications      Instructions Start Date   CITRUCEL oral powder  Generic drug:  methylcellulose   2 g, Oral, Daily      HYDROcodone-acetaminophen 7.5-325 MG per tablet  Commonly known as:  NORCO   1 tablet, Oral, Every 4 Hours PRN      ZOFRAN 8 MG tablet  Generic drug:  ondansetron   8 mg, Oral, Every 8 Hours PRN         Changes to Medications      Instructions Start Date   lamoTRIgine 200 MG tablet  Commonly known as:  LaMICtal  What changed:  how much to take   200 mg, Oral, 2 Times Daily         Continue These Medications      Instructions Start Date   albuterol 108 (90 Base) MCG/ACT inhaler  Commonly known as:  PROVENTIL HFA;VENTOLIN HFA   2 puffs, Inhalation, Every 6 Hours PRN      aspirin 81 MG chewable tablet   81 mg, Oral, Daily      carboxymethylcellulose 0.5 % solution  Commonly known as:  REFRESH PLUS   1 drop, Both Eyes, 4 Times Daily PRN      dicyclomine 20 MG tablet  Commonly known as:  BENTYL   20 mg, Oral, Every 6 Hours PRN       fluticasone 50 MCG/ACT nasal spray  Commonly known as:  FLONASE   2 sprays, Nasal, Daily      insulin aspart 100 UNIT/ML solution pen-injector sc pen  Commonly known as:  novoLOG FLEXPEN   25 Units, Subcutaneous, 3 Times Daily Before Meals      Insulin Glargine 100 UNIT/ML injection pen  Commonly known as:  LANTUS SOLOSTAR   Subcutaneous, 2 Times Daily, 72 units @ night 60 units in morning      isosorbide dinitrate 20 MG tablet  Commonly known as:  ISORDIL   20 mg, Oral, 2 Times Daily      levETIRAcetam 500 MG tablet  Commonly known as:  KEPPRA   1,500 mg, Oral, Daily      levETIRAcetam 500 MG tablet  Commonly known as:  KEPPRA   2,000 mg, Oral, Nightly      lisinopril 40 MG tablet  Commonly known as:  PRINIVIL,ZESTRIL   20 mg, Oral, Nightly      metFORMIN 1000 MG tablet  Commonly known as:  GLUCOPHAGE   1,000 mg, Oral, 2 Times Daily With Meals, HOLD x 48 hours post contrast. May resume 3/18/18      metoprolol tartrate 100 MG tablet  Commonly known as:  LOPRESSOR   50 mg, Oral, Every 12 Hours Scheduled      MULTI VITAMIN PO   1 tablet, Oral, Daily      naproxen sodium 220 MG tablet  Commonly known as:  ALEVE   220 mg, Oral, Daily PRN      nitroglycerin 0.4 MG SL tablet  Commonly known as:  NITROSTAT   0.4 mg, Sublingual, Every 5 Minutes PRN, Take no more than 3 doses in 15 minutes.      ondansetron ODT 4 MG disintegrating tablet  Commonly known as:  ZOFRAN-ODT   4 mg, Oral, Every 6 Hours PRN      pantoprazole 40 MG EC tablet  Commonly known as:  PROTONIX   40 mg, Oral, 2 Times Daily      psyllium 58.6 % packet  Commonly known as:  METAMUCIL   1 packet, Oral, Daily PRN      rosuvastatin 40 MG tablet  Commonly known as:  CRESTOR   20 mg, Oral, Nightly      triamterene-hydrochlorothiazide 75-50 MG per tablet  Commonly known as:  MAXZIDE   0.5 tablets, Oral, Daily      urea 40 % ointment  Commonly known as:  CARMOL   1 application, Topical, Daily PRN, Apply to heels.      VICTOZA SC   1.2 Units, Subcutaneous,  Nightly           Discharge Diet:   Diet Instructions     Advance Diet as Tolerated           Activity at Discharge:   Activity Instructions     Activity as Tolerated       Discharge Activity       1) No driving for next 4 days, showers are okay beginning on Friday, leave the dressing on as long as they are dry and waterproof if they are not waterproof go ahead and remove and do not replaced.  Do not expect a bowel movement until Sunday if no bowel movement by Monday please take 1 bottle of magnesium citrate.  No lifting or straining more than 15 pounds for the next month.        Discharge Care Plan/Instructions:   1.  Postoperative care as instructed per Dr. Ann  2.  Return for any acute or worsening symptoms    Follow-up Appointments:   1.  Primary care provider in 1 week  2.  Dr. Ann in 1 week  3.  Dr. Ramey in 6 weeks- call sooner if needed  Future Appointments  Date Time Provider Department Center   9/21/2018 10:00 AM Usha Hammer APRN [facility] None   11/14/2018 8:30 AM Wade Ramey MD [facility] None     Test Results Pending at Discharge: None    TEENA Martinez  08/02/18  10:56 AM    Time: 45 minutes        Electronically signed by Laura De La Torre APRN at 8/2/2018 11:14 AM

## 2018-08-02 NOTE — PROGRESS NOTES
Continued Stay Note   Charla     Patient Name: Carlos A Boyer Jr.  MRN: [id number]  Today's Date: 8/2/2018    Admit Date: 7/28/2018          Discharge Plan     Row Name 08/02/18 1119       Plan    Final Note PT to dc home with no needs. [facility] and [facility].     Row Name 08/02/18 1116       Plan    Final Discharge Disposition Code 01 - home or self-care              Discharge Codes    No documentation.       Expected Discharge Date and Time     Expected Discharge Date Expected Discharge Time    Aug 2, 2018             VIKI Flores

## 2018-08-02 NOTE — NURSING NOTE
Multiple attempts were made to obtain IV access on Mr. Boyer.      Ml Burks RN attempted.  Robbie Mina RN attempted.   Maura LIU Charge nurse attempted.   Loraine Killian Supervisor attempted.     Dr. Ann notified patient no longer has IV access.

## 2018-08-02 NOTE — PLAN OF CARE
Problem: Patient Care Overview  Goal: Plan of Care Review  Outcome: Ongoing (interventions implemented as appropriate)   08/02/18 0682   Coping/Psychosocial   Plan of Care Reviewed With patient   Plan of Care Review   Progress improving   OTHER   Outcome Summary VSS. Pt c/o RUQ pain and given norco x1 dose with relief. Pt currently has no IV access. Multiple nurses attempted to obtain access and were unsuccessful.. MD aware. Will continue to monitor and notify MD of changes.     Goal: Individualization and Mutuality  Outcome: Ongoing (interventions implemented as appropriate)      Problem: Cardiac: ACS (Acute Coronary Syndrome) (Adult)  Goal: Signs and Symptoms of Listed Potential Problems Will be Absent, Minimized or Managed (Cardiac: ACS)  Outcome: Ongoing (interventions implemented as appropriate)

## 2018-08-02 NOTE — PROGRESS NOTES
LOS: 2 days   Patient Care Team:  Vinayak Correia PA as PCP - General (Physician Assistant)  Vinayak Correia PA as PCP - Family Medicine  Wade Ramey MD as PCP - Claims Attributed  Wade Ramey MD as Cardiologist (Cardiology)  Sumanth Badillo MD as Consulting Physician (Urology)  Cyril Blake MD as Referring Physician (Otolaryngology)  Mayank Ibarra MD as Consulting Physician (Otolaryngology)  Hamilton White MD as Consulting Physician (Neurology)    Chief Complaint: Central abdominal pain   Subjective     Subjective     Postoperative day #1 following laparoscopic cholecystectomy, he is done well from his surgery he is increasing his activity he has minimal incisional pain.  No nausea, poor IV access he lost his IV last night there unable to restart his IV, he is tolerating a regular diet no nausea or vomiting he has had flatus but no bowel movement   Objective      Objective     Vital Signs  Temp:  [96.9 °F (36.1 °C)-97.9 °F (36.6 °C)] 96.9 °F (36.1 °C)  Heart Rate:  [61-82] 69  Resp:  [12-20] 18  BP: (101-155)/() 114/66    Intake & Output (last 3 days)       07/30 0701 - 07/31 0700 07/31 0701 - 08/01 0700 08/01 0701 - 08/02 0700 08/02 0701 - 08/03 0700    P.O. 360 740 240     I.V. (mL/kg)   1000 (9.1)     Total Intake(mL/kg) 360 (3.3) 740 (6.7) 1240 (11.3)     Urine (mL/kg/hr)   200 (0.1)     Total Output     200      Net +360 +740 +1040              Unmeasured Urine Occurrence 5 x 4 x 2 x     Unmeasured Stool Occurrence 2 x             Physical Exam:     General Appearance:    Alert, cooperative, in no acute distress   Lungs:     Clear to auscultation,respirations regular, even and                  unlabored    Heart:    Regular rhythm and normal rate, normal S1 and S2, no            murmur, no gallop, no rub, no click   Chest Wall:    No abnormalities observed   Abdomen:     Normal bowel sounds, no masses, no organomegaly, soft        non-tender, non-distended,wound  clean and dry, no   erythema, no discharge         Results Review:     I reviewed the patient's new clinical results.  I reviewed the patient's new imaging results and agree with the interpretation.      Results from last 7 days  Lab Units 08/02/18  0518 08/01/18  0355 07/31/18  0457   WBC 10*3/mm3 7.04 5.09 5.83   HEMOGLOBIN g/dL 13.6* 14.2 14.2   HEMATOCRIT % 39.4* 41.5 42.7   PLATELETS 10*3/mm3 108* 116* 123*          Results from last 7 days  Lab Units 08/01/18  0631 07/31/18  0457 07/30/18  0424  07/28/18  1343   SODIUM mmol/L 139 138 140  < > 140   POTASSIUM mmol/L 4.7 4.6 4.7  < > 4.7   CHLORIDE mmol/L 103 101 102  < > 103   CO2 mmol/L 22.0* 24.0 25.0  < > 22.0*   BUN mg/dL 18 21 23*  < > 22*   CREATININE mg/dL 0.95 1.07 1.12  < > 1.37   CALCIUM mg/dL 9.7 9.6 10.0  < > 10.0   BILIRUBIN mg/dL  --   --   --   --  0.7   ALK PHOS U/L  --   --   --   --  95   ALT (SGPT) U/L  --   --   --   --  35   AST (SGOT) U/L  --   --   --   --  36   GLUCOSE mg/dL 190* 196* 186*  < > 257*   < > = values in this interval not displayed.    Assessment/Plan     Assessment/Plan     Principal Problem:    Epigastric pain  Active Problems:    Chest pain    Precordial pain      Patient with the above problem okay with me for discharge, follow-up with me in 1 week for wound check sooner if questions or problems arise would not expect a bowel movement until Sunday if no bowel movement by Monday please take 1 bottle of magnesium citrate.    Shane nAn MD  08/02/18  10:47 AM      Time: time spent with patient 15 minutes     EMR Dragon/Transcription disclaimer: Much of this encounter note is an electronic transcription/translation of spoken language to printed text. The electronic translation of spoken language may permit erroneous, or at times, nonsensical words or phrases to be inadvertently transcribed; although I have reviewed the note for such errors, some may still exist.

## 2018-08-03 LAB
CYTO UR: NORMAL
LAB AP CASE REPORT: NORMAL
PATH REPORT.FINAL DX SPEC: NORMAL
PATH REPORT.GROSS SPEC: NORMAL

## 2018-08-03 NOTE — PAYOR COMM NOTE
"DC HOME 8-2-18      Carlos A Boyer (58 y.o. Male)     Date of Birth Social Security Number Address Home Phone MRN    1960  [address] [place] [id number] [phone] [id number]    [employer]oration[employer] Marital Status          Yazdanism        Admission Date Admission Type Admitting Provider Attending Provider Department, Room/Bed    7/28/18 Emergency Gabby Fam DO  [facility] 4B, 442/1    Discharge Date Discharge Disposition Discharge Destination        8/2/2018 Home or Self Care              Attending Provider:  (none)   Allergies:  Flagyl [Metronidazole], Atorvastatin, Ciprofloxacin    Isolation:  None   Infection:  None   Code Status:  Prior    Ht:  182.9 cm (72\")   Wt:  110 kg (243 lb 4.8 oz)    Admission Cmt:  None   Principal Problem:  Epigastric pain [R10.13] More...                 Active Insurance as of 7/28/2018     Primary Coverage     Payor Plan Insurance Group Employer/Plan Group    MEDICARE MEDICARE A & B      Payor Plan Address Payor Plan Phone Number Effective From Effective To    [address] [phone] 9/1/2014     [facility] [address]       Subscriber Name Subscriber Birth Date Member ID       CARLOS A BOYER JRGloria 1960 [id number]           Secondary Coverage     Payor Plan Insurance Group Employer/Plan Group    [employer] [id number]      Payor Plan Address Payor Plan Phone Number Effective From Effective To    [facility] [employer] [phone] 1/1/2017     [address] [facility] [address]       Subscriber Name Subscriber Birth Date Member ID       CARLOS A BOYER  1960 [id number]                 Emergency Contacts      (Rel.) Home Phone Work Phone Mobile Phone    Daniela Boyer (Spouse) [phone] -- [phone]               Discharge Summary      Gabby Fam DO at 8/2/2018 10:56 AM              [facility]  DISCHARGE SUMMARY       Date of Admission: " 7/28/2018  Date of Discharge:  8/2/2018  Primary Care Physician: Vinayak Correia PA    Presenting Problem/History of Present Illness:  Chest pain, unspecified type [R07.9]  Chest pain, unspecified type [R07.9]  Chest pain, unspecified type [R07.9]     Final Discharge Diagnoses:  1.  Chest pain-positive dobutamine stress echo  2.  Biliary dyskinesia with gallbladder EF 2% on HIDA scan  3.  Gastroesophageal reflux disease without esophagitis  4.  Type II Diabetes Mellitus-insulin dependent A1C 7.2%  5.  Obesity BMI 32  6.  Obstructive sleep apnea  7.  Mild acute kidney injury- improved  8.  Nausea/vomiting   9.  Irritable bowel syndrome  10.  Thrombocytopenia- mild    Consults:   1.  Dr. Wade Ramey-cardiology   2.  Dr. Benitez Mas- gastroenterology  3.  Dr. Shane Ann-general surgery    Procedures Performed:   1.  07/30/2018-upper GI endoscopy  2.  08/01/2018-laparoscopic cholecystectomy with intraoperative cholangiogram    Pertinent Test Results:   Lab Results (all)     Procedure Component Value Units Date/Time    POC Glucose Once [[id number]]  (Abnormal) Collected:  08/02/18 0752    Specimen:  Blood Updated:  08/02/18 0835     Glucose 243 (H) mg/dL      Comment: : [id number] Kp LisaMeter ID: [id number]       Tissue Pathology Exam [[id number]] Collected:  08/01/18 1420    Specimen:  Tissue from Gallbladder Updated:  08/02/18 0748    Comprehensive Metabolic Panel [[id number]] Updated:  08/02/18 0559    Specimen:  Blood     CBC (No Diff) [[id number]]  (Abnormal) Collected:  08/02/18 0518    Specimen:  Blood Updated:  08/02/18 0536     WBC 7.04 10*3/mm3      RBC 4.53 (L) 10*6/mm3      Hemoglobin 13.6 (L) g/dL      Hematocrit 39.4 (L) %      MCV 87.0 fL      MCH 30.0 pg      MCHC 34.5 g/dL      RDW 12.1 %      RDW-SD 38.9 (L) fl      MPV 12.0 fL      Platelets 108 (L) 10*3/mm3     POC Glucose Once [[id number]]  (Abnormal) Collected:  08/01/18 2041    Specimen:  Blood Updated:  08/01/18 2056     Glucose 207  (H) mg/dL      Comment: : [id number] English SethMeter ID: [id number]       POC Glucose Once [[id number]]  (Abnormal) Collected:  08/01/18 1624    Specimen:  Blood Updated:  08/01/18 1643     Glucose 237 (H) mg/dL      Comment: : [id number] Helena KaleeMeter ID: [id number]       POC Glucose Once [[id number]]  (Abnormal) Collected:  08/01/18 1501    Specimen:  Blood Updated:  08/01/18 1513     Glucose 178 (H) mg/dL      Comment: : [id number] Rachna BethMeter ID: [id number]       POC Glucose Once [[id number]]  (Abnormal) Collected:  08/01/18 1201    Specimen:  Blood Updated:  08/01/18 1212     Glucose 137 (H) mg/dL      Comment: : [id number] Helena KaleeMeter ID: [id number]       POC Glucose Once [[id number]]  (Abnormal) Collected:  08/01/18 0758    Specimen:  Blood Updated:  08/01/18 0809     Glucose 198 (H) mg/dL      Comment: : [id number] Helena KaleeMeter ID: [id number]       Basic Metabolic Panel [[id number]]  (Abnormal) Collected:  08/01/18 0631    Specimen:  Blood Updated:  08/01/18 0725     Glucose 190 (H) mg/dL      BUN 18 mg/dL      Creatinine 0.95 mg/dL      Sodium 139 mmol/L      Potassium 4.7 mmol/L      Chloride 103 mmol/L      CO2 22.0 (L) mmol/L      Calcium 9.7 mg/dL      eGFR Non African Amer 81 mL/min/1.73      BUN/Creatinine Ratio 18.9     Anion Gap 14.0 (H) mmol/L     Narrative:       GFR Normal >60  Chronic Kidney Disease <60  Kidney Failure <15    CBC (No Diff) [[id number]]  (Abnormal) Collected:  08/01/18 0355    Specimen:  Blood Updated:  08/01/18 0451     WBC 5.09 10*3/mm3      RBC 4.76 (L) 10*6/mm3      Hemoglobin 14.2 g/dL      Hematocrit 41.5 %      MCV 87.2 fL      MCH 29.8 pg      MCHC 34.2 g/dL      RDW 12.3 %      RDW-SD 39.7 (L) fl      MPV 11.4 fL      Platelets 116 (L) 10*3/mm3     POC Glucose Once [[id number]]  (Abnormal) Collected:  07/31/18 2000    Specimen:  Blood Updated:  07/31/18 2010     Glucose 190 (H) mg/dL      Comment: : [id number] Rosetta  AnanyaMeter ID: [id number]       POC Glucose Once [[id number]]  (Normal) Collected:  07/31/18 1603    Specimen:  Blood Updated:  07/31/18 1614     Glucose 103 mg/dL      Comment: : [id number] Helena GaneeMeter ID: [id number]       Urease For H Pylori - Tissue, Stomach [[id number]]  (Normal) Collected:  07/30/18 1311    Specimen:  Tissue from Stomach Updated:  07/31/18 1341     Urease Negative    POC Glucose Once [[id number]]  (Abnormal) Collected:  07/31/18 0743    Specimen:  Blood Updated:  07/31/18 0754     Glucose 187 (H) mg/dL      Comment: : [id number] Helena Crescendo BioscienceeeMeter ID: [id number]       CBC Auto Differential [[id number]]  (Abnormal) Collected:  07/31/18 0457    Specimen:  Blood Updated:  07/31/18 0557     WBC 5.83 10*3/mm3      RBC 4.87 10*6/mm3      Hemoglobin 14.2 g/dL      Hematocrit 42.7 %      MCV 87.7 fL      MCH 29.2 pg      MCHC 33.3 g/dL      RDW 12.5 %      RDW-SD 40.0 fl      MPV 11.7 fL      Platelets 123 (L) 10*3/mm3      Neutrophil % 45.8 %      Lymphocyte % 34.5 %      Monocyte % 9.9 %      Eosinophil % 8.6 (H) %      Basophil % 0.9 %      Neutrophils, Absolute 2.67 10*3/mm3      Lymphocytes, Absolute 2.01 10*3/mm3      Monocytes, Absolute 0.58 10*3/mm3      Eosinophils, Absolute 0.50 10*3/mm3      Basophils, Absolute 0.05 10*3/mm3     Basic Metabolic Panel [[id number]]  (Abnormal) Collected:  07/31/18 0457    Specimen:  Blood Updated:  07/31/18 0556     Glucose 196 (H) mg/dL      BUN 21 mg/dL      Creatinine 1.07 mg/dL      Sodium 138 mmol/L      Potassium 4.6 mmol/L      Chloride 101 mmol/L      CO2 24.0 mmol/L      Calcium 9.6 mg/dL      eGFR Non African Amer 70 mL/min/1.73      BUN/Creatinine Ratio 19.6     Anion Gap 13.0 mmol/L     POC Glucose Once [[id number]]  (Abnormal) Collected:  07/30/18 2001    Specimen:  Blood Updated:  07/30/18 2012     Glucose 179 (H) mg/dL      Comment: : [id number] Rosetta HareMeter ID: [id number]       POC Glucose Once [[id number]]  (Abnormal)  Collected:  07/30/18 1534    Specimen:  Blood Updated:  07/30/18 1620     Glucose 163 (H) mg/dL      Comment: : [id number] Marlon EuraisaMeter ID: [id number]       Basic Metabolic Panel [[id number]]  (Abnormal) Collected:  07/30/18 0424    Specimen:  Blood Updated:  07/30/18 0921     Glucose 186 (H) mg/dL      BUN 23 (H) mg/dL      Creatinine 1.12 mg/dL      Sodium 140 mmol/L      Potassium 4.7 mmol/L      Chloride 102 mmol/L      CO2 25.0 mmol/L      Calcium 10.0 mg/dL      eGFR Non African Amer 67 mL/min/1.73      BUN/Creatinine Ratio 20.5     Anion Gap 13.0 mmol/L     POC Glucose Once [[id number]]  (Abnormal) Collected:  07/30/18 0826    Specimen:  Blood Updated:  07/30/18 0847     Glucose 161 (H) mg/dL      Comment: : [id number] Marlon EuraisaMeter ID: [id number]       TSH [[id number]]  (Normal) Collected:  07/30/18 0424    Specimen:  Blood Updated:  07/30/18 0542     TSH 1.590 mIU/mL     POC Glucose Once [[id number]]  (Abnormal) Collected:  07/29/18 1951    Specimen:  Blood Updated:  07/29/18 2002     Glucose 226 (H) mg/dL      Comment: : [id number] Rosetta YoungerleyMeter ID: [id number]       POC Glucose Once [[id number]]  (Abnormal) Collected:  07/29/18 1641    Specimen:  Blood Updated:  07/29/18 1652     Glucose 132 (H) mg/dL      Comment: : [id number] Marlon EuraisaMeter ID: [id number]       Basic Metabolic Panel [[id number]]  (Abnormal) Collected:  07/29/18 0356    Specimen:  Blood Updated:  07/29/18 1350     Glucose 188 (H) mg/dL      BUN 21 mg/dL      Creatinine 1.29 mg/dL      Sodium 141 mmol/L      Potassium 3.8 mmol/L      Chloride 101 mmol/L      CO2 25.0 mmol/L      Calcium 9.7 mg/dL      eGFR Non African Amer 57 (L) mL/min/1.73      BUN/Creatinine Ratio 16.3     Anion Gap 15.0 (H) mmol/L     POC Glucose Once [[id number]]  (Abnormal) Collected:  07/29/18 0757    Specimen:  Blood Updated:  07/29/18 0832     Glucose 180 (H) mg/dL      Comment: : [id number] Marlon EuraisaMeter ID: [id number]        POC Glucose Once [[id number]]  (Abnormal) Collected:  07/29/18 0552    Specimen:  Blood Updated:  07/29/18 0604     Glucose 264 (H) mg/dL      Comment: : [id number] Crissy Cline ID: [id number]       Hemoglobin A1c [[id number]] Collected:  07/29/18 0355    Specimen:  Blood Updated:  07/29/18 0456     Hemoglobin A1C 7.2 %     Lipid Panel [[id number]]  (Abnormal) Collected:  07/29/18 0356    Specimen:  Blood Updated:  07/29/18 0424     Total Cholesterol 167 mg/dL      Triglycerides 409 (H) mg/dL      HDL Cholesterol 32 (L) mg/dL      LDL Cholesterol  89 mg/dL      LDL/HDL Ratio --     Comment: Unable to calculate       Troponin [[id number]]  (Normal) Collected:  07/28/18 2328    Specimen:  Blood Updated:  07/29/18 0019     Troponin I <0.012 ng/mL     POC Glucose Once [[id number]]  (Abnormal) Collected:  07/28/18 1935    Specimen:  Blood Updated:  07/28/18 1947     Glucose 133 (H) mg/dL      Comment: : [id number] Rosetta Dias ID: [id number]       Troponin [[id number]]  (Normal) Collected:  07/28/18 1645    Specimen:  Blood from Arm, Right Updated:  07/28/18 1722     Troponin I 0.019 ng/mL      Comment: Auto resulted.       Troponin [[id number]]  (Normal) Collected:  07/28/18 1343    Specimen:  Blood from Arm, Right Updated:  07/28/18 1416     Troponin I <0.012 ng/mL     Comprehensive Metabolic Panel [[id number]]  (Abnormal) Collected:  07/28/18 1343    Specimen:  Blood from Arm, Right Updated:  07/28/18 1414     Glucose 257 (H) mg/dL      BUN 22 (H) mg/dL      Creatinine 1.37 mg/dL      Sodium 140 mmol/L      Potassium 4.7 mmol/L      Chloride 103 mmol/L      CO2 22.0 (L) mmol/L      Calcium 10.0 mg/dL      Total Protein 7.9 g/dL      Albumin 4.80 g/dL      ALT (SGPT) 35 U/L      AST (SGOT) 36 U/L      Alkaline Phosphatase 95 U/L      Total Bilirubin 0.7 mg/dL      eGFR Non African Amer 53 (L) mL/min/1.73      Globulin 3.1 gm/dL      A/G Ratio 1.5 g/dL      BUN/Creatinine Ratio 16.1     Anion Gap 15.0 (H)  mmol/L     Protime-INR [[id number]]  (Abnormal) Collected:  07/28/18 1343    Specimen:  Blood from Arm, Right Updated:  07/28/18 1412     Protime 12.3 Seconds      INR 0.89 (L)    aPTT [[id number]]  (Normal) Collected:  07/28/18 1343    Specimen:  Blood from Arm, Right Updated:  07/28/18 1412     PTT 25.4 seconds     Lipase [[id number]]  (Normal) Collected:  07/28/18 1343    Specimen:  Blood from Arm, Right Updated:  07/28/18 1411     Lipase 92 U/L     CBC Auto Differential [[id number]]  (Abnormal) Collected:  07/28/18 1343    Specimen:  Blood from Arm, Right Updated:  07/28/18 1407     WBC 6.44 10*3/mm3      RBC 5.12 10*6/mm3      Hemoglobin 15.1 g/dL      Hematocrit 44.2 %      MCV 86.3 fL      MCH 29.5 pg      MCHC 34.2 g/dL      RDW 12.5 %      RDW-SD 39.5 (L) fl      MPV 11.9 fL      Platelets 148 10*3/mm3      Neutrophil % 54.7 %      Lymphocyte % 30.7 %      Monocyte % 8.1 %      Eosinophil % 5.7 (H) %      Basophil % 0.5 %      Immature Grans % 0.3 %      Neutrophils, Absolute 3.52 10*3/mm3      Lymphocytes, Absolute 1.98 10*3/mm3      Monocytes, Absolute 0.52 10*3/mm3      Eosinophils, Absolute 0.37 10*3/mm3      Basophils, Absolute 0.03 10*3/mm3      Immature Grans, Absolute 0.02 10*3/mm3      nRBC 0.0 /100 WBC         Imaging Results (all)     Procedure Component Value Units Date/Time    FL Cholangiogram Operative [[id number]] Collected:  08/01/18 1455     Updated:  08/02/18 0759    Narrative:       FL CHOLANGIOGRAM OPERATIVE- 8/1/2018 2:05 PM CDT     HISTORY: stones; R07.9-Chest pain, unspecified; R10.13-Epigastric pain;  R07.2-Precordial pain     COMPARISON: None     FLUOROSCOPY TIME: 14 seconds     NUMBER OF IMAGES: 8       Impression:       Contrast was injected through the cystic duct stump, and multiple  fluoroscopic images of the RIGHT upper quadrant were obtained  intraoperatively. The opacified intrahepatic and extrahepatic ducts  demonstrate no filling defects or obstruction. Contrast is noted in  the  small bowel. Surgical instruments and clips are noted in the RIGHT upper  quadrant.     Please refer to the operative note for more details.  This report was finalized on 08/01/2018 14:56 by Dr Merritt Reaves, .    FL C Arm During Surgery [[id number]] Collected:  08/01/18 1455     Updated:  08/02/18 0759    Narrative:       FL CHOLANGIOGRAM OPERATIVE- 8/1/2018 2:05 PM CDT     HISTORY: stones; R07.9-Chest pain, unspecified; R10.13-Epigastric pain;  R07.2-Precordial pain     COMPARISON: None     FLUOROSCOPY TIME: 14 seconds     NUMBER OF IMAGES: 8       Impression:       Contrast was injected through the cystic duct stump, and multiple  fluoroscopic images of the RIGHT upper quadrant were obtained  intraoperatively. The opacified intrahepatic and extrahepatic ducts  demonstrate no filling defects or obstruction. Contrast is noted in the  small bowel. Surgical instruments and clips are noted in the RIGHT upper  quadrant.     Please refer to the operative note for more details.  This report was finalized on 08/01/2018 14:56 by Dr Merritt Reaves, .    NM Hepatobiliary Without CCK [[id number]] Collected:  07/31/18 1323     Updated:  07/31/18 1522    Narrative:       EXAM: NM HEPATOBILIARY WITHOUT CCK- - 7/31/2018 10:45 AM CDT     HISTORY: RUQ pain, cholecystitis suspected; R07.9-Chest pain,  unspecified; R10.13-Epigastric pain; R07.2-Precordial pain       COMPARISON: 7/30/2018.      TECHNIQUE:    5.3 mCi of Tc-99m Mebrofenin was administered intravenously and serial  anterior planar images over the liver were obtained. 30 mL corn oil  emulsion was administered orally with subsequent imaging per protocol.     FINDINGS:   Homogenous activity throughout the liver with good clearance of  background activity. This indicates good hepatocellular function.      Biliary tree activity is seen at 10 minutes. The gallbladder is seen at  15 minutes. Bowel activity is seen at 25 minutes.     The computed gallbladder ejection fraction  is 2 percent. (normal  >20  percent).           Impression:       1. Low gallbladder ejection fraction, which can be due to chronic  cholecystitis.     This examination was initially reviewed and dictated by Dr. Mari Melton.     Exam additionally reviewed by Dr. Merritt Reaves. Agree with the stated  findings and impression.  This report was finalized on 07/31/2018 15:19 by Dr Merritt Reaves, .    US Gallbladder [[id number]] Collected:  07/30/18 1419     Updated:  07/30/18 1423    Narrative:       EXAMINATION:   US GALLBLADDER-  7/30/2018 2:19 PM CDT     HISTORY: ULTRASOUND GALLBLADDER 7/30/2018 1:37 PM CDT     REASON FOR EXAM: nausea, epi pain; R07.9-Chest pain, unspecified;  R10.13-Epigastric pain       COMPARISON: NONE      TECHNIQUE: Multiple longitudinal and transverse realtime sonographic  images of the right upper quadrant of the abdomen are obtained.      FINDINGS:    Pancreas: Normal in size and echogenicity.      Liver: Liver is mildly echogenic. This represents hepatic steatosis..      Gallbladder: No intraluminal echoes, wall thickening, or pericholecystic  fluid.      Bile ducts: The CBD measures 0.4 cm in diameter. There is no  intrahepatic or extrahepatic ductal dilation.      Right kidney: Normal in size, shape and contour. No mass, hydronephrosis  or calculi. No perinephric fluid collection.       Other: The visualized abdominal aorta is normal in caliber.        Impression:       Mildly echogenic liver representing mild hepatic steatosis        This report was finalized on 07/30/2018 14:20 by Dr. Maco Akbar MD.    XR Chest 1 View [[id number]] Collected:  07/28/18 1432     Updated:  07/28/18 1435    Narrative:       XR CHEST 1 VW- 7/28/2018 2:23 PM CDT     HISTORY: chest pain       COMPARISON: 3/15/2018.     FINDINGS:   The lungs are clear. The cardiomediastinal silhouette and pulmonary  vascularity are unchanged.      The osseous structures and surrounding soft tissues demonstrate no  acute  abnormality.       Impression:       1. No radiographic evidence of acute cardiopulmonary process.        This report was finalized on 07/28/2018 14:32 by Dr. Delio Langston MD.        Chief Complaint on Day of Discharge: Abdominal pain    History of Present Illness on Day of Discharge: The patient is sitting up on the side of the bed.  He tells me he feels well today except for some abdominal pain secondary to surgery.  He has started been up and walking and running.  He is tolerating a diet without difficulty.  He denies any chest pain or shortness of breath worse than his baseline.    Hospital Course:  Mr. Boyer is a 58-year-old  male who follows Vinayak Correia PA-C for primary care.  He has a past medical history significant for coronary artery disease, diabetes mellitus type II, gastroesophageal reflux disease, essential hypertension, hyperlipidemia, migraine, seizure disorder, CVA, and sleep apnea.  She presented to the [facility] emergency department on 07/28/2018 with complaints of retrosternally located chest pain [place] the [place].  This was associated with nausea, vomiting, and diaphoresis with radiation to the left shoulder.  The patient had taken 3 nitroglycerin and a baby aspirin prior to presentation.  In the emergency department, initial troponin value was noted to be negative.  Other lab values were essentially within normal limits.  Chest x-ray no evidence of acute cardiopulmonary process.  The patient was admitted to the hospitalist service for further evaluation and management.    Troponin values were trended and noted to be negative subsequently.  Patient was sent for dobutamine stress echocardiogram on the day following admission.  This was interpreted as abnormal exam with echocardiographic evidence for myocardial ischemia located in the septal wall and apical wall.  It was at this point cardiology was consult.  The patient was seen by Dr. Ramey.   "Patient did have a heart cath in July 2017 which showed nonocclusive coronary arteries with widely patent stents.  Dr. Ramey did recommend a repeat heart cath after a GI evaluation.  The patient was taken for upper GI endoscopy on 07/30/2018.  This was a normal endoscopy, and a gallbladder ultrasound was then ordered.  Findings showed no intraluminal echoes, wall thickening, or pericholecystic fluid, but did show a mildly echogenic liver representing mild hepatic steatosis.  HIDA scan was performed which showed a gallbladder ejection fraction of 2%.  General surgery was consulted the patient was taken for laparoscopic cholecystectomy on 08/01/2018.  Dr. Ramey feels that the majority of the patient's presenting symptoms were secondary to his biliary dyskinesia, and he will follow up with him in the next 6 weeks with instructions to call sooner for worsening shortness of breath or chest pain.  The patient is overall hemodynamically stable and appropriate for discharge home today.  He will need follow-up with his primary care provider in 1 week.  He will need to follow-up with Dr. Ann in 1 week.    Condition on Discharge:  Stable    Physical Exam on Discharge:  /66 (BP Location: Right arm, Patient Position: Sitting)   Pulse 69   Temp 96.9 °F (36.1 °C) (Temporal Artery )   Resp 18   Ht 182.9 cm (72\")   Wt 110 kg (243 lb 4.8 oz)   SpO2 97%   BMI 33.00 kg/m²    Physical Exam   Constitutional: He is oriented to person, place, and time. He appears well-developed and well-nourished. No distress.   Obese body habitus   HENT:   Head: Normocephalic and atraumatic.   Neck: Normal range of motion. Neck supple. No JVD present. No tracheal deviation present. No thyromegaly present.   Cardiovascular: Normal rate, regular rhythm, normal heart sounds and intact distal pulses.  Exam reveals no gallop and no friction rub.    No murmur heard.  Normal sinus rhythm 76-86 with first-degree   Pulmonary/Chest: Effort normal and " breath sounds normal. He has no wheezes. He has no rales.   Abdominal: Bowel sounds are normal. He exhibits distension. There is tenderness. There is no guarding.   Musculoskeletal: Normal range of motion. He exhibits no edema or tenderness.   Lymphadenopathy:     He has no cervical adenopathy.   Neurological: He is alert and oriented to person, place, and time. No cranial nerve deficit.   Skin: Skin is warm and dry. No erythema. No pallor.   Dry scaly skin.  Laparoscopic incision ×4 to abdomen with Tegaderm clean/dry/intact.   Psychiatric: He has a normal mood and affect. His behavior is normal. Judgment and thought content normal.   Vitals reviewed.    Discharge Disposition:  Home or Self Care    Discharge Medications:     Discharge Medications      New Medications      Instructions Start Date   CITRUCEL oral powder  Generic drug:  methylcellulose   2 g, Oral, Daily      HYDROcodone-acetaminophen 7.5-325 MG per tablet  Commonly known as:  NORCO   1 tablet, Oral, Every 4 Hours PRN      ZOFRAN 8 MG tablet  Generic drug:  ondansetron   8 mg, Oral, Every 8 Hours PRN         Changes to Medications      Instructions Start Date   lamoTRIgine 200 MG tablet  Commonly known as:  LaMICtal  What changed:  how much to take   200 mg, Oral, 2 Times Daily         Continue These Medications      Instructions Start Date   albuterol 108 (90 Base) MCG/ACT inhaler  Commonly known as:  PROVENTIL HFA;VENTOLIN HFA   2 puffs, Inhalation, Every 6 Hours PRN      aspirin 81 MG chewable tablet   81 mg, Oral, Daily      carboxymethylcellulose 0.5 % solution  Commonly known as:  REFRESH PLUS   1 drop, Both Eyes, 4 Times Daily PRN      dicyclomine 20 MG tablet  Commonly known as:  BENTYL   20 mg, Oral, Every 6 Hours PRN      fluticasone 50 MCG/ACT nasal spray  Commonly known as:  FLONASE   2 sprays, Nasal, Daily      insulin aspart 100 UNIT/ML solution pen-injector sc pen  Commonly known as:  novoLOG FLEXPEN   25 Units, Subcutaneous, 3 Times  Daily Before Meals      Insulin Glargine 100 UNIT/ML injection pen  Commonly known as:  LANTUS SOLOSTAR   Subcutaneous, 2 Times Daily, 72 units @ night 60 units in morning      isosorbide dinitrate 20 MG tablet  Commonly known as:  ISORDIL   20 mg, Oral, 2 Times Daily      levETIRAcetam 500 MG tablet  Commonly known as:  KEPPRA   1,500 mg, Oral, Daily      levETIRAcetam 500 MG tablet  Commonly known as:  KEPPRA   2,000 mg, Oral, Nightly      lisinopril 40 MG tablet  Commonly known as:  PRINIVIL,ZESTRIL   20 mg, Oral, Nightly      metFORMIN 1000 MG tablet  Commonly known as:  GLUCOPHAGE   1,000 mg, Oral, 2 Times Daily With Meals, HOLD x 48 hours post contrast. May resume 3/18/18      metoprolol tartrate 100 MG tablet  Commonly known as:  LOPRESSOR   50 mg, Oral, Every 12 Hours Scheduled      MULTI VITAMIN PO   1 tablet, Oral, Daily      naproxen sodium 220 MG tablet  Commonly known as:  ALEVE   220 mg, Oral, Daily PRN      nitroglycerin 0.4 MG SL tablet  Commonly known as:  NITROSTAT   0.4 mg, Sublingual, Every 5 Minutes PRN, Take no more than 3 doses in 15 minutes.      ondansetron ODT 4 MG disintegrating tablet  Commonly known as:  ZOFRAN-ODT   4 mg, Oral, Every 6 Hours PRN      pantoprazole 40 MG EC tablet  Commonly known as:  PROTONIX   40 mg, Oral, 2 Times Daily      psyllium 58.6 % packet  Commonly known as:  METAMUCIL   1 packet, Oral, Daily PRN      rosuvastatin 40 MG tablet  Commonly known as:  CRESTOR   20 mg, Oral, Nightly      triamterene-hydrochlorothiazide 75-50 MG per tablet  Commonly known as:  MAXZIDE   0.5 tablets, Oral, Daily      urea 40 % ointment  Commonly known as:  CARMOL   1 application, Topical, Daily PRN, Apply to heels.      VICTOZA SC   1.2 Units, Subcutaneous, Nightly           Discharge Diet:   Diet Instructions     Advance Diet as Tolerated           Activity at Discharge:   Activity Instructions     Activity as Tolerated       Discharge Activity       1) No driving for next 4 days,  showers are okay beginning on Friday, leave the dressing on as long as they are dry and waterproof if they are not waterproof go ahead and remove and do not replaced.  Do not expect a bowel movement until Sunday if no bowel movement by Monday please take 1 bottle of magnesium citrate.  No lifting or straining more than 15 pounds for the next month.        Discharge Care Plan/Instructions:   1.  Postoperative care as instructed per Dr. Ann  2.  Return for any acute or worsening symptoms    Follow-up Appointments:   1.  Primary care provider in 1 week  2.  Dr. Ann in 1 week  3.  Dr. Ramey in 6 weeks- call sooner if needed  Future Appointments  Date Time Provider Department Center   9/21/2018 10:00 AM Usha Hammer APRN [facility] None   11/14/2018 8:30 AM Wade Ramey MD [facility] None     Test Results Pending at Discharge: None    TEENA Martinez  08/02/18  10:56 AM    Time: 45 minutes    Chart reviewed  Patient examined.  Agree with assessment and plan.  Gabby Fam DO  08/02/18  12:00 PM          Electronically signed by Gabby Fam DO at 8/2/2018 12:00 PM

## 2018-09-21 ENCOUNTER — LAB (OUTPATIENT)
Dept: LAB | Facility: HOSPITAL | Age: 58
End: 2018-09-21

## 2018-09-21 ENCOUNTER — OFFICE VISIT (OUTPATIENT)
Dept: NEUROLOGY | Facility: CLINIC | Age: 58
End: 2018-09-21

## 2018-09-21 VITALS
HEART RATE: 95 BPM | HEIGHT: 72 IN | SYSTOLIC BLOOD PRESSURE: 140 MMHG | BODY MASS INDEX: 33.32 KG/M2 | WEIGHT: 246 LBS | DIASTOLIC BLOOD PRESSURE: 82 MMHG

## 2018-09-21 DIAGNOSIS — I10 ESSENTIAL HYPERTENSION: ICD-10-CM

## 2018-09-21 DIAGNOSIS — Z99.89 OSA ON CPAP: ICD-10-CM

## 2018-09-21 DIAGNOSIS — E11.42 DIABETIC PERIPHERAL NEUROPATHY (HCC): ICD-10-CM

## 2018-09-21 DIAGNOSIS — R55 SYNCOPE AND COLLAPSE: ICD-10-CM

## 2018-09-21 DIAGNOSIS — G47.33 OSA ON CPAP: ICD-10-CM

## 2018-09-21 DIAGNOSIS — I69.30 CHRONIC LEFT ARTERIAL ISCHEMIC STROKE, ICA (INTERNAL CAROTID ARTERY): ICD-10-CM

## 2018-09-21 DIAGNOSIS — G40.909 SEIZURE DISORDER (HCC): Primary | ICD-10-CM

## 2018-09-21 LAB
FOLATE SERPL-MCNC: 10.9 NG/ML
MAGNESIUM SERPL-MCNC: 1.9 MG/DL (ref 1.4–2.2)
VIT B12 BLD-MCNC: 460 PG/ML (ref 239–931)

## 2018-09-21 PROCEDURE — 82607 VITAMIN B-12: CPT | Performed by: CLINICAL NURSE SPECIALIST

## 2018-09-21 PROCEDURE — 99214 OFFICE O/P EST MOD 30 MIN: CPT | Performed by: CLINICAL NURSE SPECIALIST

## 2018-09-21 PROCEDURE — 84207 ASSAY OF VITAMIN B-6: CPT | Performed by: CLINICAL NURSE SPECIALIST

## 2018-09-21 PROCEDURE — 82746 ASSAY OF FOLIC ACID SERUM: CPT | Performed by: CLINICAL NURSE SPECIALIST

## 2018-09-21 PROCEDURE — 83735 ASSAY OF MAGNESIUM: CPT | Performed by: CLINICAL NURSE SPECIALIST

## 2018-09-21 PROCEDURE — 36415 COLL VENOUS BLD VENIPUNCTURE: CPT

## 2018-09-21 RX ORDER — LEVETIRACETAM 500 MG/1
2000 TABLET ORAL NIGHTLY
Qty: 360 TABLET | Refills: 1 | Status: SHIPPED | OUTPATIENT
Start: 2018-09-21 | End: 2019-03-08 | Stop reason: SDUPTHER

## 2018-09-21 RX ORDER — GABAPENTIN 100 MG/1
100 CAPSULE ORAL NIGHTLY
Qty: 30 CAPSULE | Refills: 2 | Status: SHIPPED | OUTPATIENT
Start: 2018-09-21 | End: 2019-09-21

## 2018-09-21 RX ORDER — LEVETIRACETAM 500 MG/1
1500 TABLET ORAL DAILY
Qty: 270 TABLET | Refills: 1 | Status: SHIPPED | OUTPATIENT
Start: 2018-09-21 | End: 2019-03-08 | Stop reason: SDUPTHER

## 2018-09-21 RX ORDER — LAMOTRIGINE 200 MG/1
200 TABLET ORAL 2 TIMES DAILY
Qty: 180 TABLET | Refills: 1 | Status: SHIPPED | OUTPATIENT
Start: 2018-09-21 | End: 2019-03-08 | Stop reason: SDUPTHER

## 2018-09-21 NOTE — PROGRESS NOTES
Subjective     Chief Complaint   Patient presents with   • Seizures     pt states 1-2 major tremors   • Syncope     pt states no syncope    • Peripheral Neuropathy         Carlos A Boyer Jr. is a 58 y.o. male ambidextrous retiree, .  He is here today for follow up for seizure and syncopal events. He is by himself today. He was last seen 5/2118. He denies further syncopal spells. He denies seizures but does  Reports 2 episodes of tremors lasting seconds, like a shiver.  that occurred with chest/epigastric pain. No LOC. No tongue biting, bowel/bladder incontinence. He did have a cholecystectomy 7/28/18. He reports compliance with CPAP.   As you recall, He is a prior patient of Dr. REJI Roa.  He had an extensive work up at [facility] and found to have seizure disorder. He has been taking Keppra and Lamictal ever since. Patient also has hx of left hemispheric stroke with no residual.    He does have a new complaint of LE numbness and shooting pains in his feet described below. He has hx of DM and A1C has improved to 7.2. He denies falls. He has not had work up for this before. He denies back pain.     Seizures    This is a chronic (2012, would occur 2-4 times a month and last up to 10 mintues) problem. Episode onset: last episode was about April 2017. Duration: last about 5 minutes now with medications. Associated symptoms include headaches. Pertinent negatives include no confusion, no visual disturbance, no chest pain, no nausea, no vomiting and no diarrhea. Characteristics include rhythmic jerking. Characteristics do not include bowel incontinence, bladder incontinence, loss of consciousness, bit tongue or apnea. never had LOC, would have tremoring from inside out, and would have tremor in both arms and rarely in legs. would have staring , can hear Associated symptoms comments: fatigue.   Syncope   This is a new problem. The current episode started in the past 7 days. Episode  frequency: at least 3 times since 1/3/17. The problem has been resolved. He lost consciousness for a period of 1 to 5 minutes (no more than 3 minutes). Exacerbated by: severe forceful cough. Associated symptoms include back pain and headaches. Pertinent negatives include no bladder incontinence, bowel incontinence, chest pain, confusion, dizziness, fever, nausea, vomiting or weakness. He has tried nothing for the symptoms. The treatment provided no relief. His past medical history is significant for HTN and seizures. leslye and has not used CPAP because of nasal surgery.   Peripheral Neuropathy   This is a chronic problem. Episode onset: since 2016 worse since June 2018. The problem occurs every several days (4-5 times per week and mostly at night). Associated symptoms include headaches. Pertinent negatives include no arthralgias, chest pain, fatigue, fever, myalgias, nausea, vomiting or weakness. Associated symptoms comments: Shooting pains, burning sensations, numbness in toes. When barefoot feels like walking on pipo. Shooting pains can last about 1 hour and does wake from sleep. Exacerbated by: DM, dyslipidemia, obesity. He has tried nothing (A1C improved) for the symptoms.        Current Outpatient Prescriptions   Medication Sig Dispense Refill   • albuterol (PROVENTIL HFA;VENTOLIN HFA) 108 (90 BASE) MCG/ACT inhaler Inhale 2 puffs Every 6 (Six) Hours As Needed for wheezing.     • aspirin 81 MG chewable tablet Chew 81 mg Daily.     • carboxymethylcellulose (REFRESH PLUS) 0.5 % solution Administer 1 drop to both eyes 4 (Four) Times a Day As Needed for Dry Eyes.     • dicyclomine (BENTYL) 20 MG tablet Take 1 tablet by mouth Every 6 (Six) Hours As Needed (abdominal pain). 8 tablet 0   • fluticasone (FLONASE) 50 MCG/ACT nasal spray 2 sprays into each nostril Daily.     • HYDROcodone-acetaminophen (NORCO) 7.5-325 MG per tablet Take 1 tablet by mouth Every 4 (Four) Hours As Needed for Moderate Pain  for up to 40  doses. 40 tablet 0   • insulin aspart (novoLOG FLEXPEN) 100 UNIT/ML solution pen-injector sc pen Inject 25 Units under the skin 3 (Three) Times a Day Before Meals.     • Insulin Glargine (LANTUS SOLOSTAR) 100 UNIT/ML injection pen Inject  under the skin 2 (Two) Times a Day. 72 units @ night  60 units in morning     • isosorbide dinitrate (ISORDIL) 20 MG tablet Take 1 tablet by mouth 2 (Two) Times a Day. 60 tablet 0   • lamoTRIgine (LaMICtal) 200 MG tablet Take 1 tablet by mouth 2 (Two) Times a Day. 180 tablet 1   • levETIRAcetam (KEPPRA) 500 MG tablet Take 3 tablets by mouth Daily. 270 tablet 1   • levETIRAcetam (KEPPRA) 500 MG tablet Take 4 tablets by mouth Every Night. 360 tablet 1   • Liraglutide (VICTOZA SC) Inject 1.2 Units under the skin into the appropriate area as directed Every Night.     • lisinopril (PRINIVIL,ZESTRIL) 40 MG tablet Take 20 mg by mouth Every Night.     • metFORMIN (GLUCOPHAGE) 1000 MG tablet Take 1 tablet by mouth 2 (Two) Times a Day With Meals. HOLD x 48 hours post contrast. May resume 3/18/18     • metoprolol tartrate (LOPRESSOR) 100 MG tablet Take 50 mg by mouth Every 12 (Twelve) Hours.     • Multiple Vitamin (MULTI VITAMIN PO) Take 1 tablet by mouth Daily.     • naproxen sodium (ALEVE) 220 MG tablet Take 220 mg by mouth Daily As Needed for Headache.     • nitroglycerin (NITROSTAT) 0.4 MG SL tablet Place 0.4 mg under the tongue Every 5 (Five) Minutes As Needed for chest pain. Take no more than 3 doses in 15 minutes.     • ondansetron ODT (ZOFRAN-ODT) 4 MG disintegrating tablet Take 1 tablet by mouth Every 6 (Six) Hours As Needed for Nausea or Vomiting. 8 tablet 0   • pantoprazole (PROTONIX) 40 MG EC tablet Take 40 mg by mouth 2 (Two) Times a Day.     • psyllium (METAMUCIL) 58.6 % packet Take 1 packet by mouth Daily As Needed (constipation).     • rosuvastatin (CRESTOR) 40 MG tablet Take 20 mg by mouth Every Night.     • triamterene-hydrochlorothiazide (MAXZIDE) 75-50 MG per tablet Take  0.5 tablets by mouth Daily.     • urea (CARMOL) 40 % ointment Apply 1 application topically Daily As Needed for Dry Skin. Apply to heels.     • ZOFRAN 8 MG tablet Take 1 tablet by mouth Every 8 (Eight) Hours As Needed for Nausea or Vomiting for up to 10 doses. 10 tablet 1   • gabapentin (NEURONTIN) 100 MG capsule Take 1 capsule by mouth Every Night. 30 capsule 2     No current facility-administered medications for this visit.        Past Medical History:   Diagnosis Date   • Arthritis    • Asthma    • Carotid disease, bilateral (CMS/HCC)    • Chest pain    • Chronic ischemic heart disease    • Chronic sinusitis    • Maribell bullosa    • Coronary artery disease    • Deviated septum    • Diabetes mellitus (CMS/HCC)    • Difficulty urinating    • Diverticulitis    • Enlarged prostate    • Fatty liver    • GERD (gastroesophageal reflux disease)    • Hyperlipidemia    • Hypertension    • Hypertrophy of nasal turbinates    • Keratoderma    • Kidney stone    • Migraine    • Murmur, heart    • Myocardial infarction    • Obesity    • PONV (postoperative nausea and vomiting)    • Psoriasis    • Seizures (CMS/HCC)    • Sinus congestion    • Sleep apnea    • SOB (shortness of breath)    • Stroke (CMS/HCC)        Past Surgical History:   Procedure Laterality Date   • CARDIAC CATHETERIZATION  01/2016    Dr. Broadbent; widely patent previously placed stents in the left anterior descending and obstructive disease involving the diagonal branch which was treated medically   • CARDIAC CATHETERIZATION N/A 7/14/2017    Procedure: Left Heart Cath;  Surgeon: Wade Ramey MD;  Location: [facility] INVASIVE LOCATION;  Service:    • CHOLECYSTECTOMY WITH INTRAOPERATIVE CHOLANGIOGRAM N/A 8/1/2018    Procedure: CHOLECYSTECTOMY LAPAROSCOPIC INTRAOPERATIVE CHOLANGIOGRAM;  Surgeon: Shane Ann MD;  Location: [facility] OR;  Service: General   • CORONARY ANGIOPLASTY     • CORONARY STENT PLACEMENT      x 6   • ENDOSCOPIC FUNCTIONAL SINUS SURGERY  (FESS) Bilateral 12/13/2017    Procedure: PROCEDURE PERFORMED:  Bilateral functional endoscopic anterior ethmoidectomy with bilateral middle meatal antrostomy Septoplasty Right kathia bullosa resection Bilateral inferior turbinate reduction via Coblation;  Surgeon: Mayank Ibarra MD;  Location: [facility] OR;  Service:    • ENDOSCOPY N/A 7/30/2018    Procedure: ESOPHAGOGASTRODUODENOSCOPY WITH ANESTHESIA;  Surgeon: Benitez Mas MD;  Location:  [facility];  Service: Gastroenterology   • HERNIA REPAIR      x2 inguinal area   • KIDNEY STONE SURGERY     • KNEE SURGERY Right    • OTHER SURGICAL HISTORY      urolift   • PROSTATE SURGERY      Dr. Badillo - 2017   • THUMB AMPUTATION Left     partial   • TOE AMPUTATION Right     big       family history includes Heart disease in his father; No Known Problems in his mother.    Social History   Substance Use Topics   • Smoking status: Former Smoker     Years: 4.50     Types: Cigarettes     Quit date: 1993   • Smokeless tobacco: Former User     Types: Chew     Quit date: 2009   • Alcohol use No      Comment: quit 2009       Review of Systems   Constitutional: Negative.  Negative for fatigue and fever.   HENT: Positive for sinus pressure. Negative for hearing loss and postnasal drip.    Eyes: Negative.  Negative for visual disturbance.   Respiratory: Negative.  Negative for apnea, chest tightness and shortness of breath.    Cardiovascular: Positive for syncope. Negative for chest pain.   Gastrointestinal: Negative.  Negative for bowel incontinence, constipation, diarrhea, nausea and vomiting.   Endocrine: Negative.    Genitourinary: Negative.  Negative for bladder incontinence, dysuria and frequency.   Musculoskeletal: Positive for back pain. Negative for arthralgias, gait problem and myalgias.   Skin: Negative.    Allergic/Immunologic: Negative.    Neurological: Positive for seizures, syncope and headaches. Negative for dizziness, tremors, loss of consciousness and  "weakness.   Hematological: Negative.  Negative for adenopathy.   Psychiatric/Behavioral: Negative.  Negative for agitation, confusion and hallucinations.   All other systems reviewed and are negative.      Objective     /82   Pulse 95   Ht 182.9 cm (72\")   Wt 112 kg (246 lb)   BMI 33.36 kg/m² , Body mass index is 33.36 kg/m².    Physical Exam   Constitutional: He is oriented to person, place, and time. Vital signs are normal. He appears well-developed and well-nourished. He is cooperative.   HENT:   Head: Normocephalic.   Right Ear: Hearing and external ear normal.   Left Ear: Hearing and external ear normal.   Nose: Nose normal.   Mouth/Throat: Oropharynx is clear and moist.   Eyes: Pupils are equal, round, and reactive to light. Conjunctivae, EOM and lids are normal. Right eye exhibits normal extraocular motion and no nystagmus. Left eye exhibits normal extraocular motion and no nystagmus. Right pupil is round and reactive. Left pupil is round and reactive. Pupils are equal.   Neck: Normal range of motion. Neck supple. Carotid bruit is not present.   Cardiovascular: Normal rate, regular rhythm, S1 normal, S2 normal and normal heart sounds.    No murmur heard.  Pulmonary/Chest: Effort normal and breath sounds normal.   Frequent severe forceful cough   Abdominal: Soft. Bowel sounds are normal.   Musculoskeletal: Normal range of motion.   Neurological: He is alert and oriented to person, place, and time. He has normal strength and normal reflexes. He displays no tremor. No cranial nerve deficit or sensory deficit. He exhibits normal muscle tone. Coordination (patient has tendency to sway standing with eyes closed., no ataxia, FTN, HTS intact bilateral) abnormal. Gait normal.   Reflex Scores:       Tricep reflexes are 2+ on the right side and 2+ on the left side.       Bicep reflexes are 2+ on the right side and 2+ on the left side.       Brachioradialis reflexes are 2+ on the right side and 2+ on the left " side.       Patellar reflexes are 2+ on the right side and 2+ on the left side.       Achilles reflexes are 2+ on the right side and 2+ on the left side.  Awake, alert. No aphasia, no dysarthria  Completes simple and complex commands    CN II:  Visual fields full.  Pupils equally reactive to light  CN III, IV, VI:  Extraocular Muscles full with no signs of nystagmus  CN V:  Facial sensory is symmetric with no asymetries.  CN VII:  Facial motor symmetric  CN VIII:  Gross hearing intact bilaterally  CN IX:  Palate elevates symmetrically  CN X:  Palate elevates symmetrically  CN XI:  Shoulder shrug symmetric  CN XII:  Tongue is midline on protrusion    Full and symmetric strength bilateral upper and lower extremities.   Skin: Skin is warm and dry.   Psychiatric: He has a normal mood and affect. His speech is normal and behavior is normal. Cognition and memory are normal.   Nursing note and vitals reviewed.      Results for orders placed or performed during the hospital encounter of 07/28/18   Urease For H Pylori - Tissue, Stomach   Result Value Ref Range    Urease Negative Negative   Troponin   Result Value Ref Range    Troponin I <0.012 0.000 - 0.034 ng/mL   Comprehensive Metabolic Panel   Result Value Ref Range    Glucose 257 (H) 70 - 100 mg/dL    BUN 22 (H) 5 - 21 mg/dL    Creatinine 1.37 0.50 - 1.40 mg/dL    Sodium 140 135 - 145 mmol/L    Potassium 4.7 3.5 - 5.3 mmol/L    Chloride 103 98 - 110 mmol/L    CO2 22.0 (L) 24.0 - 31.0 mmol/L    Calcium 10.0 8.4 - 10.4 mg/dL    Total Protein 7.9 6.3 - 8.7 g/dL    Albumin 4.80 3.50 - 5.00 g/dL    ALT (SGPT) 35 0 - 54 U/L    AST (SGOT) 36 7 - 45 U/L    Alkaline Phosphatase 95 24 - 120 U/L    Total Bilirubin 0.7 0.1 - 1.0 mg/dL    eGFR Non African Amer 53 (L) >60 mL/min/1.73    Globulin 3.1 gm/dL    A/G Ratio 1.5 1.1 - 2.5 g/dL    BUN/Creatinine Ratio 16.1 7.0 - 25.0    Anion Gap 15.0 (H) 4.0 - 13.0 mmol/L   Protime-INR   Result Value Ref Range    Protime 12.3 11.9 - 14.6  Seconds    INR 0.89 (L) 0.91 - 1.09   aPTT   Result Value Ref Range    PTT 25.4 24.1 - 34.8 seconds   Lipase   Result Value Ref Range    Lipase 92 23 - 203 U/L   Troponin   Result Value Ref Range    Troponin I 0.019 0.000 - 0.034 ng/mL   CBC Auto Differential   Result Value Ref Range    WBC 6.44 4.80 - 10.80 10*3/mm3    RBC 5.12 4.80 - 5.90 10*6/mm3    Hemoglobin 15.1 14.0 - 18.0 g/dL    Hematocrit 44.2 40.0 - 52.0 %    MCV 86.3 82.0 - 95.0 fL    MCH 29.5 28.0 - 32.0 pg    MCHC 34.2 33.0 - 36.0 g/dL    RDW 12.5 12.0 - 15.0 %    RDW-SD 39.5 (L) 40.0 - 54.0 fl    MPV 11.9 6.0 - 12.0 fL    Platelets 148 130 - 400 10*3/mm3    Neutrophil % 54.7 39.0 - 78.0 %    Lymphocyte % 30.7 15.0 - 45.0 %    Monocyte % 8.1 4.0 - 12.0 %    Eosinophil % 5.7 (H) 0.0 - 4.0 %    Basophil % 0.5 0.0 - 2.0 %    Immature Grans % 0.3 0.0 - 5.0 %    Neutrophils, Absolute 3.52 1.87 - 8.40 10*3/mm3    Lymphocytes, Absolute 1.98 0.72 - 4.86 10*3/mm3    Monocytes, Absolute 0.52 0.19 - 1.30 10*3/mm3    Eosinophils, Absolute 0.37 0.00 - 0.70 10*3/mm3    Basophils, Absolute 0.03 0.00 - 0.20 10*3/mm3    Immature Grans, Absolute 0.02 0.00 - 0.03 10*3/mm3    nRBC 0.0 0.0 - 0.0 /100 WBC   Troponin   Result Value Ref Range    Troponin I <0.012 0.000 - 0.034 ng/mL   Hemoglobin A1c   Result Value Ref Range    Hemoglobin A1C 7.2 %   Lipid Panel   Result Value Ref Range    Total Cholesterol 167 130 - 200 mg/dL    Triglycerides 409 (H) 0 - 149 mg/dL    HDL Cholesterol 32 (L) >=40 mg/dL    LDL Cholesterol  89 0 - 99 mg/dL    LDL/HDL Ratio     Basic Metabolic Panel   Result Value Ref Range    Glucose 188 (H) 70 - 100 mg/dL    BUN 21 5 - 21 mg/dL    Creatinine 1.29 0.50 - 1.40 mg/dL    Sodium 141 135 - 145 mmol/L    Potassium 3.8 3.5 - 5.3 mmol/L    Chloride 101 98 - 110 mmol/L    CO2 25.0 24.0 - 31.0 mmol/L    Calcium 9.7 8.4 - 10.4 mg/dL    eGFR Non African Amer 57 (L) >60 mL/min/1.73    BUN/Creatinine Ratio 16.3 7.0 - 25.0    Anion Gap 15.0 (H) 4.0 - 13.0  mmol/L   TSH   Result Value Ref Range    TSH 1.590 0.470 - 4.680 mIU/mL   Basic Metabolic Panel   Result Value Ref Range    Glucose 186 (H) 70 - 100 mg/dL    BUN 23 (H) 5 - 21 mg/dL    Creatinine 1.12 0.50 - 1.40 mg/dL    Sodium 140 135 - 145 mmol/L    Potassium 4.7 3.5 - 5.3 mmol/L    Chloride 102 98 - 110 mmol/L    CO2 25.0 24.0 - 31.0 mmol/L    Calcium 10.0 8.4 - 10.4 mg/dL    eGFR Non African Amer 67 >60 mL/min/1.73    BUN/Creatinine Ratio 20.5 7.0 - 25.0    Anion Gap 13.0 4.0 - 13.0 mmol/L   CBC Auto Differential   Result Value Ref Range    WBC 5.83 4.80 - 10.80 10*3/mm3    RBC 4.87 4.80 - 5.90 10*6/mm3    Hemoglobin 14.2 14.0 - 18.0 g/dL    Hematocrit 42.7 40.0 - 52.0 %    MCV 87.7 82.0 - 95.0 fL    MCH 29.2 28.0 - 32.0 pg    MCHC 33.3 33.0 - 36.0 g/dL    RDW 12.5 12.0 - 15.0 %    RDW-SD 40.0 40.0 - 54.0 fl    MPV 11.7 6.0 - 12.0 fL    Platelets 123 (L) 130 - 400 10*3/mm3    Neutrophil % 45.8 39.0 - 78.0 %    Lymphocyte % 34.5 15.0 - 45.0 %    Monocyte % 9.9 4.0 - 12.0 %    Eosinophil % 8.6 (H) 0.0 - 4.0 %    Basophil % 0.9 0.0 - 2.0 %    Neutrophils, Absolute 2.67 1.87 - 8.40 10*3/mm3    Lymphocytes, Absolute 2.01 0.72 - 4.86 10*3/mm3    Monocytes, Absolute 0.58 0.19 - 1.30 10*3/mm3    Eosinophils, Absolute 0.50 0.00 - 0.70 10*3/mm3    Basophils, Absolute 0.05 0.00 - 0.20 10*3/mm3   Basic Metabolic Panel   Result Value Ref Range    Glucose 196 (H) 70 - 100 mg/dL    BUN 21 5 - 21 mg/dL    Creatinine 1.07 0.50 - 1.40 mg/dL    Sodium 138 135 - 145 mmol/L    Potassium 4.6 3.5 - 5.3 mmol/L    Chloride 101 98 - 110 mmol/L    CO2 24.0 24.0 - 31.0 mmol/L    Calcium 9.6 8.4 - 10.4 mg/dL    eGFR Non African Amer 70 >60 mL/min/1.73    BUN/Creatinine Ratio 19.6 7.0 - 25.0    Anion Gap 13.0 4.0 - 13.0 mmol/L   CBC (No Diff)   Result Value Ref Range    WBC 5.09 4.80 - 10.80 10*3/mm3    RBC 4.76 (L) 4.80 - 5.90 10*6/mm3    Hemoglobin 14.2 14.0 - 18.0 g/dL    Hematocrit 41.5 40.0 - 52.0 %    MCV 87.2 82.0 - 95.0 fL    MCH  29.8 28.0 - 32.0 pg    MCHC 34.2 33.0 - 36.0 g/dL    RDW 12.3 12.0 - 15.0 %    RDW-SD 39.7 (L) 40.0 - 54.0 fl    MPV 11.4 6.0 - 12.0 fL    Platelets 116 (L) 130 - 400 10*3/mm3   Basic Metabolic Panel   Result Value Ref Range    Glucose 190 (H) 70 - 100 mg/dL    BUN 18 5 - 21 mg/dL    Creatinine 0.95 0.50 - 1.40 mg/dL    Sodium 139 135 - 145 mmol/L    Potassium 4.7 3.5 - 5.3 mmol/L    Chloride 103 98 - 110 mmol/L    CO2 22.0 (L) 24.0 - 31.0 mmol/L    Calcium 9.7 8.4 - 10.4 mg/dL    eGFR Non African Amer 81 >60 mL/min/1.73    BUN/Creatinine Ratio 18.9 7.0 - 25.0    Anion Gap 14.0 (H) 4.0 - 13.0 mmol/L   CBC (No Diff)   Result Value Ref Range    WBC 7.04 4.80 - 10.80 10*3/mm3    RBC 4.53 (L) 4.80 - 5.90 10*6/mm3    Hemoglobin 13.6 (L) 14.0 - 18.0 g/dL    Hematocrit 39.4 (L) 40.0 - 52.0 %    MCV 87.0 82.0 - 95.0 fL    MCH 30.0 28.0 - 32.0 pg    MCHC 34.5 33.0 - 36.0 g/dL    RDW 12.1 12.0 - 15.0 %    RDW-SD 38.9 (L) 40.0 - 54.0 fl    MPV 12.0 6.0 - 12.0 fL    Platelets 108 (L) 130 - 400 10*3/mm3   Comprehensive Metabolic Panel   Result Value Ref Range    Glucose 220 (H) 70 - 100 mg/dL    BUN 23 (H) 5 - 21 mg/dL    Creatinine 1.05 0.50 - 1.40 mg/dL    Sodium 137 135 - 145 mmol/L    Potassium 4.6 3.5 - 5.3 mmol/L    Chloride 101 98 - 110 mmol/L    CO2 24.0 24.0 - 31.0 mmol/L    Calcium 9.8 8.4 - 10.4 mg/dL    Total Protein 7.4 6.3 - 8.7 g/dL    Albumin 4.40 3.50 - 5.00 g/dL    ALT (SGPT) 63 (H) 0 - 54 U/L    AST (SGOT) 59 (H) 7 - 45 U/L    Alkaline Phosphatase 77 24 - 120 U/L    Total Bilirubin 0.8 0.1 - 1.0 mg/dL    eGFR Non African Amer 72 >60 mL/min/1.73    Globulin 3.0 gm/dL    A/G Ratio 1.5 1.1 - 2.5 g/dL    BUN/Creatinine Ratio 21.9 7.0 - 25.0    Anion Gap 12.0 4.0 - 13.0 mmol/L   POC Glucose Once   Result Value Ref Range    Glucose 133 (H) 70 - 130 mg/dL   POC Glucose Once   Result Value Ref Range    Glucose 264 (H) 70 - 130 mg/dL   POC Glucose Once   Result Value Ref Range    Glucose 180 (H) 70 - 130 mg/dL   POC  Glucose Once   Result Value Ref Range    Glucose 132 (H) 70 - 130 mg/dL   POC Glucose Once   Result Value Ref Range    Glucose 226 (H) 70 - 130 mg/dL   POC Glucose Once   Result Value Ref Range    Glucose 161 (H) 70 - 130 mg/dL   POC Glucose Once   Result Value Ref Range    Glucose 163 (H) 70 - 130 mg/dL   POC Glucose Once   Result Value Ref Range    Glucose 179 (H) 70 - 130 mg/dL   POC Glucose Once   Result Value Ref Range    Glucose 187 (H) 70 - 130 mg/dL   POC Glucose Once   Result Value Ref Range    Glucose 103 70 - 130 mg/dL   POC Glucose Once   Result Value Ref Range    Glucose 190 (H) 70 - 130 mg/dL   POC Glucose Once   Result Value Ref Range    Glucose 198 (H) 70 - 130 mg/dL   POC Glucose Once   Result Value Ref Range    Glucose 137 (H) 70 - 130 mg/dL   POC Glucose Once   Result Value Ref Range    Glucose 178 (H) 70 - 130 mg/dL   POC Glucose Once   Result Value Ref Range    Glucose 237 (H) 70 - 130 mg/dL   POC Glucose Once   Result Value Ref Range    Glucose 207 (H) 70 - 130 mg/dL   POC Glucose Once   Result Value Ref Range    Glucose 243 (H) 70 - 130 mg/dL   POC Glucose Once   Result Value Ref Range    Glucose 249 (H) 70 - 130 mg/dL   Tissue Pathology Exam   Result Value Ref Range    Case Report       Surgical Pathology Report                         Case: [id number]                                  Authorizing Provider:  Shane Ann MD       Collected:           08/01/2018 02:20 PM          Ordering Location:     [facility] OR  Received:            08/02/2018 07:48 AM          Pathologist:           Candice Melgar MD                                                          Specimen:    Gallbladder, GALLBLADDER AND CONTENTS                                                      Final Diagnosis       Gallbladder, cholecystectomy:  Chronic cholecystitis      Gross Description       Specimen #1 is received in a formalin filled container, labeled with the patient's name, date of birth, and  "\"gallbladder\".  The specimen consists of an intact gallbladder measuring 8.3 x 3.8 x 3.3 cm.  The serosal surface is yellow-blue, smooth and glistening.  Cystic duct appears patent.  The gallbladder wall averages 0.1 cm in thickness, the mucosa is green-brown and velvety.  No stones are identified.  Representative sections of gallbladder wall and the cystic duct resection margin are submitted in block 1A.      Microscopic Description       Histologic sections of gallbladder and cystic duct are lined by simple columnar epithelium with basally oriented nuclei.  Rokitansky Aschoff sinuses are identified.  There is mild infiltration of lamina propria and muscularis by lymphocytes.  There is no evidence of dysplasia or malignancy.    All of this report is an electronic transcription/translation of spoken language to printed text. The electronic translation of spoken language may permit erroneous, or at times, nonsensical words or phrases to be inadvertently transcribed; although I have reviewed the report for such errors, some may still exist.     Light Blue Top   Result Value Ref Range    Extra Tube hold for add-on    Green Top (Gel)   Result Value Ref Range    Extra Tube Hold for add-ons.    Lavender Top   Result Value Ref Range    Extra Tube hold for add-on    Red Top   Result Value Ref Range    Extra Tube Hold for add-ons.    Adult Stress Echo W/ Cont or Stress Agent if Necessary Per Protocol   Result Value Ref Range    BH CV STRESS PROTOCOL 1 Pharmacologic     Stage 1 1     HR Stage 1 74     BP Stage 1 124/93     Duration Min Stage 1 3     Duration Sec Stage 1 0     Stress Dose Dobutamine Stage 1 10     Stage 2 2     HR Stage 2 76     BP Stage 2 125/80     Duration Min Stage 2 3     Duration Sec Stage 2 0     Stress Dose Dobutamine Stage 2 20     Stage 3 3     HR Stage 3 109     BP Stage 3 138/71     Duration Min Stage 3 3     Duration Sec Stage 3 0     Stress Dose Dobutamine Stage 3 30     Stage 4 4     HR Stage 4 " 142     BP Stage 4 116/72     Duration Min Stage 4 3     Duration Sec Stage 4 0     Stress Dose Dobutamine Stage 4 40     Baseline HR 70 bpm    Baseline /83 mmHg    Peak  bpm    Percent Max Pred HR 88.75 %    Percent Target  %    Peak /72 mmHg    Recovery HR 94 bpm    Recovery /83 mmHg    Target HR (85%) 136 bpm    Max. Pred. HR (100%) 160 bpm    Echo EF Estimated 55 %    PostStressEF 60 %        ASSESSMENT/PLAN    Diagnoses and all orders for this visit:    Seizure disorder (CMS/HCC)    Essential hypertension    Syncope and collapse    Chronic left arterial ischemic stroke, ICA (internal carotid artery)    HOA on CPAP    Diabetic peripheral neuropathy (CMS/HCC)  -     Vitamin B12; Future  -     Folate; Future  -     Magnesium; Future  -     Vitamin B6 (Pyridoxine); Future    Other orders  -     lamoTRIgine (LaMICtal) 200 MG tablet; Take 1 tablet by mouth 2 (Two) Times a Day.  -     levETIRAcetam (KEPPRA) 500 MG tablet; Take 3 tablets by mouth Daily.  -     levETIRAcetam (KEPPRA) 500 MG tablet; Take 4 tablets by mouth Every Night.  -     gabapentin (NEURONTIN) 100 MG capsule; Take 1 capsule by mouth Every Night.    MEDICAL DECISION MAKIN. Continue with CPAP   2.  Continue with Keppra 1500 mg in AM and 2000 mg in PM  3. Continue with lamictal 200 mg BID per PCP  4.  bp managed by PCP  5. Blood glucose managed by PCP and A1C goal less than 7  6. Statin per PCP for LDL goal less than 70.  7. Continue with ASA for secondary stroke prevention  8. Patient is counseled on stroke signs and symptoms using FAST and Time Saved is Brain Saved.  9. Check labs looking for correctable cause of neuropathy but likely related to DM.  10.Seizure precautions were discussed to include no tub baths, no swimming, avoiding lack of sleep, and avoiding known triggers. Education given of things that may contribute to a seizure to include, but not limited to: stressful situations, fever, fatigue, lack of  sleep, low blood sugar, hyperventilation, flashing lights, and caffeine. Instructions given to take seizure medications as prescribed. Education given to family member on what to do during a seizure and care following the seizure. Education given to contact this office prior to stopping or changing any medications.  11. Former smoker, currently not using tobacco  12. juan david reviewed.   13. Trial low dose gabapentin 100 mg at HS and counseled on side effects    HPI and ROS reviewed and updated.      allergies and all known medications/prescriptions have been reviewed using resources available on this encounter.    Return in about 6 weeks (around 11/2/2018).        TEENA Win

## 2018-09-25 ENCOUNTER — OFFICE VISIT (OUTPATIENT)
Dept: CARDIOLOGY | Facility: CLINIC | Age: 58
End: 2018-09-25

## 2018-09-25 VITALS
HEIGHT: 72 IN | HEART RATE: 74 BPM | WEIGHT: 245.4 LBS | SYSTOLIC BLOOD PRESSURE: 120 MMHG | BODY MASS INDEX: 33.24 KG/M2 | OXYGEN SATURATION: 100 % | DIASTOLIC BLOOD PRESSURE: 76 MMHG

## 2018-09-25 DIAGNOSIS — J34.89 CONCHA BULLOSA: ICD-10-CM

## 2018-09-25 DIAGNOSIS — I69.30 CHRONIC LEFT ARTERIAL ISCHEMIC STROKE, ICA (INTERNAL CAROTID ARTERY): ICD-10-CM

## 2018-09-25 DIAGNOSIS — R10.13 EPIGASTRIC PAIN: ICD-10-CM

## 2018-09-25 DIAGNOSIS — E78.2 MIXED HYPERLIPIDEMIA: ICD-10-CM

## 2018-09-25 DIAGNOSIS — I10 ESSENTIAL HYPERTENSION: Primary | ICD-10-CM

## 2018-09-25 DIAGNOSIS — J34.2 DEVIATED NASAL SEPTUM: ICD-10-CM

## 2018-09-25 DIAGNOSIS — I77.9 CAROTID DISEASE, BILATERAL (HCC): ICD-10-CM

## 2018-09-25 DIAGNOSIS — K52.1 DIARRHEA DUE TO DRUG: ICD-10-CM

## 2018-09-25 DIAGNOSIS — Z98.890 S/P FESS (FUNCTIONAL ENDOSCOPIC SINUS SURGERY): ICD-10-CM

## 2018-09-25 DIAGNOSIS — J32.0 CHRONIC SINUSITIS OF BOTH MAXILLARY SINUSES: ICD-10-CM

## 2018-09-25 DIAGNOSIS — R07.2 PRECORDIAL PAIN: ICD-10-CM

## 2018-09-25 DIAGNOSIS — E11.8 DIABETIC COMPLICATION (HCC): ICD-10-CM

## 2018-09-25 DIAGNOSIS — E11.8 TYPE 2 DIABETES MELLITUS WITH COMPLICATION, UNSPECIFIED LONG TERM INSULIN USE STATUS: ICD-10-CM

## 2018-09-25 DIAGNOSIS — I25.119 CORONARY ARTERY DISEASE INVOLVING NATIVE CORONARY ARTERY OF NATIVE HEART WITH ANGINA PECTORIS (HCC): ICD-10-CM

## 2018-09-25 DIAGNOSIS — J34.3 HYPERTROPHY OF BOTH INFERIOR NASAL TURBINATES: ICD-10-CM

## 2018-09-25 PROCEDURE — 93000 ELECTROCARDIOGRAM COMPLETE: CPT | Performed by: INTERNAL MEDICINE

## 2018-09-25 PROCEDURE — 99214 OFFICE O/P EST MOD 30 MIN: CPT | Performed by: INTERNAL MEDICINE

## 2018-09-25 RX ORDER — SODIUM CHLORIDE 9 MG/ML
100 INJECTION, SOLUTION INTRAVENOUS CONTINUOUS
Status: CANCELLED | OUTPATIENT
Start: 2018-09-25 | End: 2018-09-25

## 2018-09-25 NOTE — PROGRESS NOTES
Carlos A Boyer Jr.  [id number]  1960  58 y.o.  male    Referring Provider: Vinayak Correia PA    Reason for Follow-up Visit: Here for routine follow up   coronary artery disease stented coronary artery   Type 2 diabetes mellitus       Subjective     Chest pain with exertion, moderate substernal, pressure like. Lasts less than 5 minutes  Started 12 weeks ago  Much worse in last 2 months  Repeatedly taking s/l NTG   Occurs once or twice a week  Profuse diaphoresis  No associated nausea  No radiation  Precipitated with exertion  Relieved with rest  Not positional  No change with intake of food or antacids  No change with breathing  Moderate associated dyspnea   Feels tired   Easy fatiguability        History of present illness:  Carlos A Boyer Jr. is a 58 y.o. yo male with history of chest pain who presents today for   Chief Complaint   Patient presents with   • Chest Pain     Pain is constant on the left side of the chest when he is walking it gets worse and he has to take a nitro    • Shortness of Breath     All the time    • Fatigue     Patient stated that he could just fall asleep sitting on the table and that has started within the last 7 weeks,    .    History  Past Medical History:   Diagnosis Date   • Arthritis    • Asthma    • Carotid disease, bilateral (CMS/HCC)    • Chest pain    • Chronic ischemic heart disease    • Chronic sinusitis    • Maribell bullosa    • Coronary artery disease    • Deviated septum    • Diabetes mellitus (CMS/HCC)    • Difficulty urinating    • Diverticulitis    • Enlarged prostate    • Fatty liver    • GERD (gastroesophageal reflux disease)    • Hyperlipidemia    • Hypertension    • Hypertrophy of nasal turbinates    • Keratoderma    • Kidney stone    • Migraine    • Murmur, heart    • Myocardial infarction    • Obesity    • PONV (postoperative nausea and vomiting)    • Psoriasis    • Seizures (CMS/HCC)    • Sinus congestion    • Sleep apnea    • SOB (shortness of breath)     • Stroke (CMS/HCC)    ,   Past Surgical History:   Procedure Laterality Date   • CARDIAC CATHETERIZATION  01/2016    Dr. Broadbent; widely patent previously placed stents in the left anterior descending and obstructive disease involving the diagonal branch which was treated medically   • CARDIAC CATHETERIZATION N/A 7/14/2017    Procedure: Left Heart Cath;  Surgeon: Wade Ramey MD;  Location: [facility] CATH INVASIVE LOCATION;  Service:    • CHOLECYSTECTOMY WITH INTRAOPERATIVE CHOLANGIOGRAM N/A 8/1/2018    Procedure: CHOLECYSTECTOMY LAPAROSCOPIC INTRAOPERATIVE CHOLANGIOGRAM;  Surgeon: Shane Ann MD;  Location: [facility] OR;  Service: General   • CORONARY ANGIOPLASTY     • CORONARY STENT PLACEMENT      x 6   • ENDOSCOPIC FUNCTIONAL SINUS SURGERY (FESS) Bilateral 12/13/2017    Procedure: PROCEDURE PERFORMED:  Bilateral functional endoscopic anterior ethmoidectomy with bilateral middle meatal antrostomy Septoplasty Right kathia bullosa resection Bilateral inferior turbinate reduction via Coblation;  Surgeon: Mayank Ibarra MD;  Location: [facility] OR;  Service:    • ENDOSCOPY N/A 7/30/2018    Procedure: ESOPHAGOGASTRODUODENOSCOPY WITH ANESTHESIA;  Surgeon: Benitez Mas MD;  Location: [facility] ENDOSCOPY;  Service: Gastroenterology   • HERNIA REPAIR      x2 inguinal area   • KIDNEY STONE SURGERY     • KNEE SURGERY Right    • OTHER SURGICAL HISTORY      urolift   • PROSTATE SURGERY      Dr. Badillo - 2017   • THUMB AMPUTATION Left     partial   • TOE AMPUTATION Right     big   ,   Family History   Problem Relation Age of Onset   • Heart disease Father    • No Known Problems Mother    ,   Social History   Substance Use Topics   • Smoking status: Former Smoker     Years: 4.50     Types: Cigarettes     Quit date: 1993   • Smokeless tobacco: Former User     Types: Chew     Quit date: 2009   • Alcohol use No      Comment: quit 2009   ,     Medications  Current Outpatient Prescriptions   Medication Sig Dispense Refill   •  albuterol (PROVENTIL HFA;VENTOLIN HFA) 108 (90 BASE) MCG/ACT inhaler Inhale 2 puffs Every 6 (Six) Hours As Needed for wheezing.     • aspirin 81 MG chewable tablet Chew 81 mg Daily.     • carboxymethylcellulose (REFRESH PLUS) 0.5 % solution Administer 1 drop to both eyes 4 (Four) Times a Day As Needed for Dry Eyes.     • dicyclomine (BENTYL) 20 MG tablet Take 1 tablet by mouth Every 6 (Six) Hours As Needed (abdominal pain). 8 tablet 0   • fluticasone (FLONASE) 50 MCG/ACT nasal spray 2 sprays into each nostril Daily.     • gabapentin (NEURONTIN) 100 MG capsule Take 1 capsule by mouth Every Night. 30 capsule 2   • HYDROcodone-acetaminophen (NORCO) 7.5-325 MG per tablet Take 1 tablet by mouth Every 4 (Four) Hours As Needed for Moderate Pain  for up to 40 doses. 40 tablet 0   • insulin aspart (novoLOG FLEXPEN) 100 UNIT/ML solution pen-injector sc pen Inject 25 Units under the skin 3 (Three) Times a Day Before Meals.     • Insulin Glargine (LANTUS SOLOSTAR) 100 UNIT/ML injection pen Inject  under the skin 2 (Two) Times a Day. 72 units @ night  60 units in morning     • isosorbide dinitrate (ISORDIL) 20 MG tablet Take 1 tablet by mouth 2 (Two) Times a Day. 60 tablet 0   • lamoTRIgine (LaMICtal) 200 MG tablet Take 1 tablet by mouth 2 (Two) Times a Day. 180 tablet 1   • levETIRAcetam (KEPPRA) 500 MG tablet Take 3 tablets by mouth Daily. 270 tablet 1   • levETIRAcetam (KEPPRA) 500 MG tablet Take 4 tablets by mouth Every Night. 360 tablet 1   • Liraglutide (VICTOZA SC) Inject 1.2 Units under the skin into the appropriate area as directed Every Night.     • lisinopril (PRINIVIL,ZESTRIL) 40 MG tablet Take 20 mg by mouth Every Night.     • metFORMIN (GLUCOPHAGE) 1000 MG tablet Take 1 tablet by mouth 2 (Two) Times a Day With Meals. HOLD x 48 hours post contrast. May resume 3/18/18     • metoprolol tartrate (LOPRESSOR) 100 MG tablet Take 50 mg by mouth Every 12 (Twelve) Hours.     • Multiple Vitamin (MULTI VITAMIN PO) Take 1  tablet by mouth Daily.     • naproxen sodium (ALEVE) 220 MG tablet Take 220 mg by mouth Daily As Needed for Headache.     • nitroglycerin (NITROSTAT) 0.4 MG SL tablet Place 0.4 mg under the tongue Every 5 (Five) Minutes As Needed for chest pain. Take no more than 3 doses in 15 minutes.     • ondansetron ODT (ZOFRAN-ODT) 4 MG disintegrating tablet Take 1 tablet by mouth Every 6 (Six) Hours As Needed for Nausea or Vomiting. 8 tablet 0   • pantoprazole (PROTONIX) 40 MG EC tablet Take 40 mg by mouth 2 (Two) Times a Day.     • psyllium (METAMUCIL) 58.6 % packet Take 1 packet by mouth Daily As Needed (constipation).     • rosuvastatin (CRESTOR) 40 MG tablet Take 20 mg by mouth Every Night.     • triamterene-hydrochlorothiazide (MAXZIDE) 75-50 MG per tablet Take 0.5 tablets by mouth Daily.     • urea (CARMOL) 40 % ointment Apply 1 application topically Daily As Needed for Dry Skin. Apply to heels.     • ZOFRAN 8 MG tablet Take 1 tablet by mouth Every 8 (Eight) Hours As Needed for Nausea or Vomiting for up to 10 doses. 10 tablet 1     No current facility-administered medications for this visit.        Allergies:  Flagyl [metronidazole]; Atorvastatin; and Ciprofloxacin    Review of Systems  Review of Systems   Constitution: Positive for weakness and malaise/fatigue.   HENT: Negative.    Eyes: Negative.    Cardiovascular: Positive for chest pain and dyspnea on exertion. Negative for claudication, cyanosis, irregular heartbeat, leg swelling, near-syncope, orthopnea, palpitations, paroxysmal nocturnal dyspnea and syncope.   Respiratory: Negative.    Endocrine: Negative.    Hematologic/Lymphatic: Negative.    Skin: Negative.    Musculoskeletal: Positive for arthritis and back pain.   Gastrointestinal: Negative for anorexia.   Genitourinary: Negative.    Neurological: Positive for dizziness and light-headedness.   Psychiatric/Behavioral: Negative.        Objective     Physical Exam:  /76 (BP Location: Left arm, Patient  "Position: Sitting, Cuff Size: Adult)   Pulse 74   Ht 182.9 cm (72\")   Wt 111 kg (245 lb 6.4 oz)   SpO2 100%   BMI 33.28 kg/m²   Physical Exam   Constitutional: He appears well-developed.   HENT:   Head: Normocephalic.   Neck: Normal carotid pulses and no JVD present. No tracheal tenderness present. Carotid bruit is not present. No tracheal deviation and no edema present.   Cardiovascular: Regular rhythm, normal heart sounds and normal pulses.    Pulmonary/Chest: Effort normal. No stridor.   Abdominal: Soft.   Neurological: He is alert. He has normal strength. No cranial nerve deficit or sensory deficit.   Skin: Skin is warm.   Psychiatric: He has a normal mood and affect. His speech is normal and behavior is normal.       Results Review:  Results for orders placed during the hospital encounter of 07/28/18   Adult Stress Echo W/ Cont or Stress Agent if Necessary Per Protocol    Narrative · Left ventricular systolic function is normal. Estimated EF = 55%.  · Abnormal stress echo with echocardiographic evidence for myocardial   ischemia located in the septal wall and apical wall.        7/17 cath  Conclusion  Nonocclusive epicardial coronary arteries with widely patent stents in the  left anterior descending coronary artery   artery and left circumflex coronary artery.  Hyperdynamic left ventricle with ejection fraction of 75%.  LVEDP   17    mm Hg     Plan  Workup for noncardiac causes of chest pain this can be done as outpatient.  His d-dimer is within normal range  After a period of observation of stable can be discharged either later today.  Keep follow-up appointment with me  Ongoing risk factor modifications keep LDL below 70 mg/dL  Hydration   Observation       ECG 12 Lead  Date/Time: 9/25/2018 11:38 AM  Performed by: ZULEIKA DOYLE  Authorized by: ZULEIKA DOYLE   Comparison: compared with previous ECG from 7/28/2018  Similar to previous ECG  Rhythm: sinus rhythm  Rate: normal  T depression: I and aVL  QRS " axis: normal  Q waves: V1, V2, V3 and V4  Clinical impression: abnormal ECG            Assessment/Plan   Patient Active Problem List   Diagnosis   • Diarrhea due to drug   • Syncope and collapse   • Type 2 diabetes mellitus with complication (CMS/HCC)   • Gastroesophageal reflux disease without esophagitis   • Essential hypertension   • Seizure disorder (CMS/HCC)   • Carotid disease, bilateral (CMS/HCC)   • Coronary artery disease involving native coronary artery of native heart with angina pectoris (CMS/[facility])   • Chest pain, unspecified   • Mixed hyperlipidemia   • Chronic left arterial ischemic stroke, ICA (internal carotid artery)   • HOA on CPAP   • Chest pain   • Benign non-nodular prostatic hyperplasia with lower urinary tract symptoms   • Deviated nasal septum   • Maribell bullosa   • Hypertrophy of both inferior nasal turbinates   • Chronic sinusitis of both maxillary sinuses   • S/P FESS (functional endoscopic sinus surgery)   • Diabetic complication (CMS/[facility])   • Epigastric pain   • Precordial pain         Plan:    Recommend cardiac catheterization, selective coronary angiography, left ventriculography and percutaneous coronary intervention with application of arteriotomy hemostatic closure device.    I discussed cardiac catheterization, the procedure, risks (including bleeding, infection, vascular damage [including minor oozing, bruising, bleeding, and up to and including but not limited to the need for vascular surgery, emergency cardiothoracic surgery, contrast reaction, renal failure, respiratory failure, heart attack, stroke, arrhythmia and even death), benefits, and alternatives and the patient has voiced understanding and is willing to proceed.    Adequate pre-hydration and post cardiac catheterization hydration.  Premedications as required and indicated for cardiac catheterization.    No contraindication to drug eluting stent placement if required  Further recommendations pending results of cardiac  "catheterization        Low salt/ HTN/ Heart healthy carbohydrate restricted cardiac diet as applicable to this patient's current diagnoses.   This handout has relevant information regarding shopping for food, preparing meals, what to eat at restaurants, tracking of food intake, information regarding sodium intake and salt content, how to read food labels, knowing what to eat, tips reagarding physical activity, calorie count and calorie expenditure. What foods to avoid. Information regarding alcoholic drinks along with \"good\" and \"bad\" fats.  Reiterated prior recommendations     The patient is advised to reduce or avoid caffeine or other cardiac stimulants.     The patient was advised that NSAID-type medications have three  very important potential side effects: cardiovascular complications, gastrointestinal irritation including hemorrhage and renal injuries.  Do not use anti-inflammatories such as Motrin/ibuprofen, Alleve/naprosyn, Mobic and like medications Use Tylenol instead.The patient expresses understanding of these issues and questions were answered.   Monitor for any signs of bleeding including red or dark stools. Fall precautions.       Monitor for any signs of bleeding including red or dark stools. Fall precautions.   Patient is asked to monitor BP at home or work, several times per month and return with written values at next office visit.     Advised staying uptodate with immunizations per established standard guidelines.    Reviewed available prior notes, consults, prior visits, laboratory findings, radiology And cardiology relevant reports. Updated chart as applicable. I have reviewed the patient's medical history in detail and updated the computerized patient record as relevant.      Offered to give patient  a copy of my notes and relevant tests/ prior ECG etc for the patient to review and follow specific advise and relevant findings if any, prognosis, along with my current and future plans.  "     Questions were encouraged, asked and answered to the patient's  understanding and satisfaction. Questions if any regarding current medications and side effects, need for refills and importance of compliance to medications stressed.    Reviewed available prior notes, consults, prior visits, laboratory findings, radiology and cardiology relevant reports. Updated chart as applicable. I have reviewed the patient's medical history in detail and updated the computerized patient record as relevant.    Updated patient regarding any new or relevant abnormalities on review of records or any new findings on physical exam. Mentioned to patient about purpose of visit and desirable health short and long term goals and objectives.    Monitor CBC, CMP, TSH (as indicated) and Lipid Panel by primary        Orders Placed This Encounter   Procedures   • XR chest pa and lateral     Standing Status:   Future     Standing Expiration Date:   9/25/2019     Order Specific Question:   Reason for Exam:     Answer:   cad   • Comprehensive metabolic panel     Standing Status:   Future     Standing Expiration Date:   9/25/2019   • Protime-INR     Standing Status:   Future     Standing Expiration Date:   9/25/2019   • ECG 12 Lead     This order was created via procedure documentation   • CBC and Differential     Standing Status:   Future     Standing Expiration Date:   9/25/2019     Order Specific Question:   Manual Differential     Answer:   No           Return in about 4 months (around 1/25/2019).

## 2018-09-28 LAB — VIT B6 SERPL-MCNC: NORMAL UG/L (ref 5.3–46.7)

## 2018-10-15 ENCOUNTER — HOSPITAL ENCOUNTER (OUTPATIENT)
Facility: HOSPITAL | Age: 58
Discharge: HOME | End: 2018-10-15
Attending: INTERNAL MEDICINE | Admitting: INTERNAL MEDICINE

## 2018-10-15 VITALS
RESPIRATION RATE: 11 BRPM | HEIGHT: 72 IN | SYSTOLIC BLOOD PRESSURE: 112 MMHG | DIASTOLIC BLOOD PRESSURE: 73 MMHG | HEART RATE: 77 BPM | BODY MASS INDEX: 31.85 KG/M2 | OXYGEN SATURATION: 100 % | TEMPERATURE: 97.8 F | WEIGHT: 235.13 LBS

## 2018-10-15 DIAGNOSIS — R07.2 PRECORDIAL PAIN: ICD-10-CM

## 2018-10-15 LAB
ALBUMIN SERPL-MCNC: 4.8 G/DL (ref 3.5–5)
ALBUMIN/GLOB SERPL: 1.4 G/DL (ref 1.1–2.5)
ALP SERPL-CCNC: 102 U/L (ref 24–120)
ALT SERPL W P-5'-P-CCNC: 33 U/L (ref 0–54)
ANION GAP SERPL CALCULATED.3IONS-SCNC: 13 MMOL/L (ref 4–13)
AST SERPL-CCNC: 35 U/L (ref 7–45)
BASOPHILS # BLD AUTO: 0.03 10*3/MM3 (ref 0–0.2)
BASOPHILS NFR BLD AUTO: 0.5 % (ref 0–2)
BILIRUB SERPL-MCNC: 1.1 MG/DL (ref 0.1–1)
BUN BLD-MCNC: 20 MG/DL (ref 5–21)
BUN/CREAT SERPL: 16.4 (ref 7–25)
CALCIUM SPEC-SCNC: 10.3 MG/DL (ref 8.4–10.4)
CHLORIDE SERPL-SCNC: 101 MMOL/L (ref 98–110)
CO2 SERPL-SCNC: 26 MMOL/L (ref 24–31)
CREAT BLD-MCNC: 1.22 MG/DL (ref 0.5–1.4)
DEPRECATED RDW RBC AUTO: 40.6 FL (ref 40–54)
EOSINOPHIL # BLD AUTO: 0.21 10*3/MM3 (ref 0–0.7)
EOSINOPHIL NFR BLD AUTO: 3.7 % (ref 0–4)
ERYTHROCYTE [DISTWIDTH] IN BLOOD BY AUTOMATED COUNT: 12.7 % (ref 12–15)
GFR SERPL CREATININE-BSD FRML MDRD: 61 ML/MIN/1.73
GLOBULIN UR ELPH-MCNC: 3.5 GM/DL
GLUCOSE BLD-MCNC: 173 MG/DL (ref 70–100)
HCT VFR BLD AUTO: 45.9 % (ref 40–52)
HGB BLD-MCNC: 16 G/DL (ref 14–18)
IMM GRANULOCYTES # BLD: 0.01 10*3/MM3 (ref 0–0.03)
IMM GRANULOCYTES NFR BLD: 0.2 % (ref 0–5)
LYMPHOCYTES # BLD AUTO: 2.02 10*3/MM3 (ref 0.72–4.86)
LYMPHOCYTES NFR BLD AUTO: 35.7 % (ref 15–45)
MCH RBC QN AUTO: 30.4 PG (ref 28–32)
MCHC RBC AUTO-ENTMCNC: 34.9 G/DL (ref 33–36)
MCV RBC AUTO: 87.3 FL (ref 82–95)
MONOCYTES # BLD AUTO: 0.48 10*3/MM3 (ref 0.19–1.3)
MONOCYTES NFR BLD AUTO: 8.5 % (ref 4–12)
NEUTROPHILS # BLD AUTO: 2.91 10*3/MM3 (ref 1.87–8.4)
NEUTROPHILS NFR BLD AUTO: 51.4 % (ref 39–78)
NRBC BLD MANUAL-RTO: 0 /100 WBC (ref 0–0)
PLATELET # BLD AUTO: 133 10*3/MM3 (ref 130–400)
PMV BLD AUTO: 11.3 FL (ref 6–12)
POTASSIUM BLD-SCNC: 4.4 MMOL/L (ref 3.5–5.3)
PROT SERPL-MCNC: 8.3 G/DL (ref 6.3–8.7)
RBC # BLD AUTO: 5.26 10*6/MM3 (ref 4.8–5.9)
SODIUM BLD-SCNC: 140 MMOL/L (ref 135–145)
WBC NRBC COR # BLD: 5.66 10*3/MM3 (ref 4.8–10.8)

## 2018-10-15 PROCEDURE — 93458 L HRT ARTERY/VENTRICLE ANGIO: CPT | Performed by: INTERNAL MEDICINE

## 2018-10-15 PROCEDURE — 85025 COMPLETE CBC W/AUTO DIFF WBC: CPT | Performed by: INTERNAL MEDICINE

## 2018-10-15 PROCEDURE — 36415 COLL VENOUS BLD VENIPUNCTURE: CPT | Performed by: INTERNAL MEDICINE

## 2018-10-15 PROCEDURE — 0 IOPAMIDOL PER 1 ML: Performed by: INTERNAL MEDICINE

## 2018-10-15 PROCEDURE — 25010000002 ADENOSINE (DIAGNOSTIC) PER 30 MG: Performed by: INTERNAL MEDICINE

## 2018-10-15 PROCEDURE — C1769 GUIDE WIRE: HCPCS | Performed by: INTERNAL MEDICINE

## 2018-10-15 PROCEDURE — C1894 INTRO/SHEATH, NON-LASER: HCPCS | Performed by: INTERNAL MEDICINE

## 2018-10-15 PROCEDURE — A9270 NON-COVERED ITEM OR SERVICE: HCPCS

## 2018-10-15 PROCEDURE — 25010000002 FENTANYL CITRATE (PF) 100 MCG/2ML SOLUTION: Performed by: INTERNAL MEDICINE

## 2018-10-15 PROCEDURE — 63710000001 ASPIRIN 325 MG TABLET

## 2018-10-15 PROCEDURE — 25010000002 HEPARIN (PORCINE) PER 1000 UNITS: Performed by: INTERNAL MEDICINE

## 2018-10-15 PROCEDURE — 99152 MOD SED SAME PHYS/QHP 5/>YRS: CPT | Performed by: INTERNAL MEDICINE

## 2018-10-15 PROCEDURE — C1887 CATHETER, GUIDING: HCPCS | Performed by: INTERNAL MEDICINE

## 2018-10-15 PROCEDURE — 93571 IV DOP VEL&/PRESS C FLO 1ST: CPT | Performed by: INTERNAL MEDICINE

## 2018-10-15 PROCEDURE — 80053 COMPREHEN METABOLIC PANEL: CPT | Performed by: INTERNAL MEDICINE

## 2018-10-15 PROCEDURE — C1760 CLOSURE DEV, VASC: HCPCS | Performed by: INTERNAL MEDICINE

## 2018-10-15 PROCEDURE — 25010000002 MIDAZOLAM PER 1 MG: Performed by: INTERNAL MEDICINE

## 2018-10-15 RX ORDER — MIDAZOLAM HYDROCHLORIDE 1 MG/ML
INJECTION INTRAMUSCULAR; INTRAVENOUS AS NEEDED
Status: DISCONTINUED | OUTPATIENT
Start: 2018-10-15 | End: 2018-10-15 | Stop reason: HOSPADM

## 2018-10-15 RX ORDER — SODIUM CHLORIDE 9 MG/ML
100 INJECTION, SOLUTION INTRAVENOUS CONTINUOUS
Status: CANCELLED | OUTPATIENT
Start: 2018-10-15

## 2018-10-15 RX ORDER — ADENOSINE 3 MG/ML
INJECTION, SOLUTION INTRAVENOUS CONTINUOUS PRN
Status: COMPLETED | OUTPATIENT
Start: 2018-10-15 | End: 2018-10-15

## 2018-10-15 RX ORDER — SODIUM CHLORIDE 9 MG/ML
100 INJECTION, SOLUTION INTRAVENOUS CONTINUOUS
Status: DISCONTINUED | OUTPATIENT
Start: 2018-10-15 | End: 2018-10-15

## 2018-10-15 RX ORDER — ISOSORBIDE DINITRATE 30 MG/1
30 TABLET ORAL 3 TIMES DAILY
Qty: 270 TABLET | Refills: 3 | Status: ON HOLD | OUTPATIENT
Start: 2018-10-15 | End: 2019-06-28

## 2018-10-15 RX ORDER — LIDOCAINE HYDROCHLORIDE 20 MG/ML
INJECTION, SOLUTION INFILTRATION; PERINEURAL AS NEEDED
Status: DISCONTINUED | OUTPATIENT
Start: 2018-10-15 | End: 2018-10-15 | Stop reason: HOSPADM

## 2018-10-15 RX ORDER — SODIUM CHLORIDE 9 MG/ML
100 INJECTION, SOLUTION INTRAVENOUS CONTINUOUS
Status: DISPENSED | OUTPATIENT
Start: 2018-10-15 | End: 2018-10-15

## 2018-10-15 RX ORDER — ASPIRIN 325 MG
TABLET ORAL
Status: COMPLETED
Start: 2018-10-15 | End: 2018-10-15

## 2018-10-15 RX ORDER — ASPIRIN 325 MG
325 TABLET ORAL DAILY
Status: DISCONTINUED | OUTPATIENT
Start: 2018-10-15 | End: 2018-10-15 | Stop reason: HOSPADM

## 2018-10-15 RX ORDER — ISOSORBIDE DINITRATE 20 MG/1
40 TABLET ORAL 3 TIMES DAILY
Qty: 270 TABLET | Refills: 3 | Status: SHIPPED | OUTPATIENT
Start: 2018-10-15 | End: 2018-11-05

## 2018-10-15 RX ORDER — FENTANYL CITRATE 50 UG/ML
INJECTION, SOLUTION INTRAMUSCULAR; INTRAVENOUS AS NEEDED
Status: DISCONTINUED | OUTPATIENT
Start: 2018-10-15 | End: 2018-10-15 | Stop reason: HOSPADM

## 2018-10-15 RX ADMIN — SODIUM CHLORIDE 100 ML/HR: 9 INJECTION, SOLUTION INTRAVENOUS at 13:25

## 2018-10-15 RX ADMIN — ASPIRIN 325 MG: 325 TABLET, COATED ORAL at 13:25

## 2018-10-15 RX ADMIN — Medication 325 MG: at 13:25

## 2018-10-15 NOTE — H&P (VIEW-ONLY)
Carlos A Boyer Jr.  [id number]  1960  58 y.o.  male    Referring Provider: Vinayak Correia PA    Reason for Follow-up Visit: Here for routine follow up   coronary artery disease stented coronary artery   Type 2 diabetes mellitus       Subjective     Chest pain with exertion, moderate substernal, pressure like. Lasts less than 5 minutes  Started 12 weeks ago  Much worse in last 2 months  Repeatedly taking s/l NTG   Occurs once or twice a week  Profuse diaphoresis  No associated nausea  No radiation  Precipitated with exertion  Relieved with rest  Not positional  No change with intake of food or antacids  No change with breathing  Moderate associated dyspnea   Feels tired   Easy fatiguability        History of present illness:  Carlos A Boyer Jr. is a 58 y.o. yo male with history of chest pain who presents today for   Chief Complaint   Patient presents with   • Chest Pain     Pain is constant on the left side of the chest when he is walking it gets worse and he has to take a nitro    • Shortness of Breath     All the time    • Fatigue     Patient stated that he could just fall asleep sitting on the table and that has started within the last 7 weeks,    .    History  Past Medical History:   Diagnosis Date   • Arthritis    • Asthma    • Carotid disease, bilateral (CMS/HCC)    • Chest pain    • Chronic ischemic heart disease    • Chronic sinusitis    • Maribell bullosa    • Coronary artery disease    • Deviated septum    • Diabetes mellitus (CMS/HCC)    • Difficulty urinating    • Diverticulitis    • Enlarged prostate    • Fatty liver    • GERD (gastroesophageal reflux disease)    • Hyperlipidemia    • Hypertension    • Hypertrophy of nasal turbinates    • Keratoderma    • Kidney stone    • Migraine    • Murmur, heart    • Myocardial infarction    • Obesity    • PONV (postoperative nausea and vomiting)    • Psoriasis    • Seizures (CMS/HCC)    • Sinus congestion    • Sleep apnea    • SOB (shortness of breath)     • Stroke (CMS/HCC)    ,   Past Surgical History:   Procedure Laterality Date   • CARDIAC CATHETERIZATION  01/2016    Dr. Broadbent; widely patent previously placed stents in the left anterior descending and obstructive disease involving the diagonal branch which was treated medically   • CARDIAC CATHETERIZATION N/A 7/14/2017    Procedure: Left Heart Cath;  Surgeon: Wade Ramey MD;  Location: [facility] CATH INVASIVE LOCATION;  Service:    • CHOLECYSTECTOMY WITH INTRAOPERATIVE CHOLANGIOGRAM N/A 8/1/2018    Procedure: CHOLECYSTECTOMY LAPAROSCOPIC INTRAOPERATIVE CHOLANGIOGRAM;  Surgeon: Shane Ann MD;  Location: [facility] OR;  Service: General   • CORONARY ANGIOPLASTY     • CORONARY STENT PLACEMENT      x 6   • ENDOSCOPIC FUNCTIONAL SINUS SURGERY (FESS) Bilateral 12/13/2017    Procedure: PROCEDURE PERFORMED:  Bilateral functional endoscopic anterior ethmoidectomy with bilateral middle meatal antrostomy Septoplasty Right kathia bullosa resection Bilateral inferior turbinate reduction via Coblation;  Surgeon: Mayank Ibarra MD;  Location: [facility] OR;  Service:    • ENDOSCOPY N/A 7/30/2018    Procedure: ESOPHAGOGASTRODUODENOSCOPY WITH ANESTHESIA;  Surgeon: Benitez Mas MD;  Location: [facility] ENDOSCOPY;  Service: Gastroenterology   • HERNIA REPAIR      x2 inguinal area   • KIDNEY STONE SURGERY     • KNEE SURGERY Right    • OTHER SURGICAL HISTORY      urolift   • PROSTATE SURGERY      Dr. Badillo - 2017   • THUMB AMPUTATION Left     partial   • TOE AMPUTATION Right     big   ,   Family History   Problem Relation Age of Onset   • Heart disease Father    • No Known Problems Mother    ,   Social History   Substance Use Topics   • Smoking status: Former Smoker     Years: 4.50     Types: Cigarettes     Quit date: 1993   • Smokeless tobacco: Former User     Types: Chew     Quit date: 2009   • Alcohol use No      Comment: quit 2009   ,     Medications  Current Outpatient Prescriptions   Medication Sig Dispense Refill   •  albuterol (PROVENTIL HFA;VENTOLIN HFA) 108 (90 BASE) MCG/ACT inhaler Inhale 2 puffs Every 6 (Six) Hours As Needed for wheezing.     • aspirin 81 MG chewable tablet Chew 81 mg Daily.     • carboxymethylcellulose (REFRESH PLUS) 0.5 % solution Administer 1 drop to both eyes 4 (Four) Times a Day As Needed for Dry Eyes.     • dicyclomine (BENTYL) 20 MG tablet Take 1 tablet by mouth Every 6 (Six) Hours As Needed (abdominal pain). 8 tablet 0   • fluticasone (FLONASE) 50 MCG/ACT nasal spray 2 sprays into each nostril Daily.     • gabapentin (NEURONTIN) 100 MG capsule Take 1 capsule by mouth Every Night. 30 capsule 2   • HYDROcodone-acetaminophen (NORCO) 7.5-325 MG per tablet Take 1 tablet by mouth Every 4 (Four) Hours As Needed for Moderate Pain  for up to 40 doses. 40 tablet 0   • insulin aspart (novoLOG FLEXPEN) 100 UNIT/ML solution pen-injector sc pen Inject 25 Units under the skin 3 (Three) Times a Day Before Meals.     • Insulin Glargine (LANTUS SOLOSTAR) 100 UNIT/ML injection pen Inject  under the skin 2 (Two) Times a Day. 72 units @ night  60 units in morning     • isosorbide dinitrate (ISORDIL) 20 MG tablet Take 1 tablet by mouth 2 (Two) Times a Day. 60 tablet 0   • lamoTRIgine (LaMICtal) 200 MG tablet Take 1 tablet by mouth 2 (Two) Times a Day. 180 tablet 1   • levETIRAcetam (KEPPRA) 500 MG tablet Take 3 tablets by mouth Daily. 270 tablet 1   • levETIRAcetam (KEPPRA) 500 MG tablet Take 4 tablets by mouth Every Night. 360 tablet 1   • Liraglutide (VICTOZA SC) Inject 1.2 Units under the skin into the appropriate area as directed Every Night.     • lisinopril (PRINIVIL,ZESTRIL) 40 MG tablet Take 20 mg by mouth Every Night.     • metFORMIN (GLUCOPHAGE) 1000 MG tablet Take 1 tablet by mouth 2 (Two) Times a Day With Meals. HOLD x 48 hours post contrast. May resume 3/18/18     • metoprolol tartrate (LOPRESSOR) 100 MG tablet Take 50 mg by mouth Every 12 (Twelve) Hours.     • Multiple Vitamin (MULTI VITAMIN PO) Take 1  tablet by mouth Daily.     • naproxen sodium (ALEVE) 220 MG tablet Take 220 mg by mouth Daily As Needed for Headache.     • nitroglycerin (NITROSTAT) 0.4 MG SL tablet Place 0.4 mg under the tongue Every 5 (Five) Minutes As Needed for chest pain. Take no more than 3 doses in 15 minutes.     • ondansetron ODT (ZOFRAN-ODT) 4 MG disintegrating tablet Take 1 tablet by mouth Every 6 (Six) Hours As Needed for Nausea or Vomiting. 8 tablet 0   • pantoprazole (PROTONIX) 40 MG EC tablet Take 40 mg by mouth 2 (Two) Times a Day.     • psyllium (METAMUCIL) 58.6 % packet Take 1 packet by mouth Daily As Needed (constipation).     • rosuvastatin (CRESTOR) 40 MG tablet Take 20 mg by mouth Every Night.     • triamterene-hydrochlorothiazide (MAXZIDE) 75-50 MG per tablet Take 0.5 tablets by mouth Daily.     • urea (CARMOL) 40 % ointment Apply 1 application topically Daily As Needed for Dry Skin. Apply to heels.     • ZOFRAN 8 MG tablet Take 1 tablet by mouth Every 8 (Eight) Hours As Needed for Nausea or Vomiting for up to 10 doses. 10 tablet 1     No current facility-administered medications for this visit.        Allergies:  Flagyl [metronidazole]; Atorvastatin; and Ciprofloxacin    Review of Systems  Review of Systems   Constitution: Positive for weakness and malaise/fatigue.   HENT: Negative.    Eyes: Negative.    Cardiovascular: Positive for chest pain and dyspnea on exertion. Negative for claudication, cyanosis, irregular heartbeat, leg swelling, near-syncope, orthopnea, palpitations, paroxysmal nocturnal dyspnea and syncope.   Respiratory: Negative.    Endocrine: Negative.    Hematologic/Lymphatic: Negative.    Skin: Negative.    Musculoskeletal: Positive for arthritis and back pain.   Gastrointestinal: Negative for anorexia.   Genitourinary: Negative.    Neurological: Positive for dizziness and light-headedness.   Psychiatric/Behavioral: Negative.        Objective     Physical Exam:  /76 (BP Location: Left arm, Patient  "Position: Sitting, Cuff Size: Adult)   Pulse 74   Ht 182.9 cm (72\")   Wt 111 kg (245 lb 6.4 oz)   SpO2 100%   BMI 33.28 kg/m²   Physical Exam   Constitutional: He appears well-developed.   HENT:   Head: Normocephalic.   Neck: Normal carotid pulses and no JVD present. No tracheal tenderness present. Carotid bruit is not present. No tracheal deviation and no edema present.   Cardiovascular: Regular rhythm, normal heart sounds and normal pulses.    Pulmonary/Chest: Effort normal. No stridor.   Abdominal: Soft.   Neurological: He is alert. He has normal strength. No cranial nerve deficit or sensory deficit.   Skin: Skin is warm.   Psychiatric: He has a normal mood and affect. His speech is normal and behavior is normal.       Results Review:  Results for orders placed during the hospital encounter of 07/28/18   Adult Stress Echo W/ Cont or Stress Agent if Necessary Per Protocol    Narrative · Left ventricular systolic function is normal. Estimated EF = 55%.  · Abnormal stress echo with echocardiographic evidence for myocardial   ischemia located in the septal wall and apical wall.        7/17 cath  Conclusion  Nonocclusive epicardial coronary arteries with widely patent stents in the  left anterior descending coronary artery   artery and left circumflex coronary artery.  Hyperdynamic left ventricle with ejection fraction of 75%.  LVEDP   17    mm Hg     Plan  Workup for noncardiac causes of chest pain this can be done as outpatient.  His d-dimer is within normal range  After a period of observation of stable can be discharged either later today.  Keep follow-up appointment with me  Ongoing risk factor modifications keep LDL below 70 mg/dL  Hydration   Observation       ECG 12 Lead  Date/Time: 9/25/2018 11:38 AM  Performed by: ZULEIKA DOYLE  Authorized by: ZULEIKA DOYLE   Comparison: compared with previous ECG from 7/28/2018  Similar to previous ECG  Rhythm: sinus rhythm  Rate: normal  T depression: I and aVL  QRS " axis: normal  Q waves: V1, V2, V3 and V4  Clinical impression: abnormal ECG            Assessment/Plan   Patient Active Problem List   Diagnosis   • Diarrhea due to drug   • Syncope and collapse   • Type 2 diabetes mellitus with complication (CMS/HCC)   • Gastroesophageal reflux disease without esophagitis   • Essential hypertension   • Seizure disorder (CMS/HCC)   • Carotid disease, bilateral (CMS/HCC)   • Coronary artery disease involving native coronary artery of native heart with angina pectoris (CMS/[facility])   • Chest pain, unspecified   • Mixed hyperlipidemia   • Chronic left arterial ischemic stroke, ICA (internal carotid artery)   • HOA on CPAP   • Chest pain   • Benign non-nodular prostatic hyperplasia with lower urinary tract symptoms   • Deviated nasal septum   • Maribell bullosa   • Hypertrophy of both inferior nasal turbinates   • Chronic sinusitis of both maxillary sinuses   • S/P FESS (functional endoscopic sinus surgery)   • Diabetic complication (CMS/[facility])   • Epigastric pain   • Precordial pain         Plan:    Recommend cardiac catheterization, selective coronary angiography, left ventriculography and percutaneous coronary intervention with application of arteriotomy hemostatic closure device.    I discussed cardiac catheterization, the procedure, risks (including bleeding, infection, vascular damage [including minor oozing, bruising, bleeding, and up to and including but not limited to the need for vascular surgery, emergency cardiothoracic surgery, contrast reaction, renal failure, respiratory failure, heart attack, stroke, arrhythmia and even death), benefits, and alternatives and the patient has voiced understanding and is willing to proceed.    Adequate pre-hydration and post cardiac catheterization hydration.  Premedications as required and indicated for cardiac catheterization.    No contraindication to drug eluting stent placement if required  Further recommendations pending results of cardiac  "catheterization        Low salt/ HTN/ Heart healthy carbohydrate restricted cardiac diet as applicable to this patient's current diagnoses.   This handout has relevant information regarding shopping for food, preparing meals, what to eat at restaurants, tracking of food intake, information regarding sodium intake and salt content, how to read food labels, knowing what to eat, tips reagarding physical activity, calorie count and calorie expenditure. What foods to avoid. Information regarding alcoholic drinks along with \"good\" and \"bad\" fats.  Reiterated prior recommendations     The patient is advised to reduce or avoid caffeine or other cardiac stimulants.     The patient was advised that NSAID-type medications have three  very important potential side effects: cardiovascular complications, gastrointestinal irritation including hemorrhage and renal injuries.  Do not use anti-inflammatories such as Motrin/ibuprofen, Alleve/naprosyn, Mobic and like medications Use Tylenol instead.The patient expresses understanding of these issues and questions were answered.   Monitor for any signs of bleeding including red or dark stools. Fall precautions.       Monitor for any signs of bleeding including red or dark stools. Fall precautions.   Patient is asked to monitor BP at home or work, several times per month and return with written values at next office visit.     Advised staying uptodate with immunizations per established standard guidelines.    Reviewed available prior notes, consults, prior visits, laboratory findings, radiology And cardiology relevant reports. Updated chart as applicable. I have reviewed the patient's medical history in detail and updated the computerized patient record as relevant.      Offered to give patient  a copy of my notes and relevant tests/ prior ECG etc for the patient to review and follow specific advise and relevant findings if any, prognosis, along with my current and future plans.  "     Questions were encouraged, asked and answered to the patient's  understanding and satisfaction. Questions if any regarding current medications and side effects, need for refills and importance of compliance to medications stressed.    Reviewed available prior notes, consults, prior visits, laboratory findings, radiology and cardiology relevant reports. Updated chart as applicable. I have reviewed the patient's medical history in detail and updated the computerized patient record as relevant.    Updated patient regarding any new or relevant abnormalities on review of records or any new findings on physical exam. Mentioned to patient about purpose of visit and desirable health short and long term goals and objectives.    Monitor CBC, CMP, TSH (as indicated) and Lipid Panel by primary        Orders Placed This Encounter   Procedures   • XR chest pa and lateral     Standing Status:   Future     Standing Expiration Date:   9/25/2019     Order Specific Question:   Reason for Exam:     Answer:   cad   • Comprehensive metabolic panel     Standing Status:   Future     Standing Expiration Date:   9/25/2019   • Protime-INR     Standing Status:   Future     Standing Expiration Date:   9/25/2019   • ECG 12 Lead     This order was created via procedure documentation   • CBC and Differential     Standing Status:   Future     Standing Expiration Date:   9/25/2019     Order Specific Question:   Manual Differential     Answer:   No           Return in about 4 months (around 1/25/2019).

## 2018-10-23 ENCOUNTER — TELEPHONE (OUTPATIENT)
Dept: CARDIOLOGY | Facility: CLINIC | Age: 58
End: 2018-10-23

## 2018-10-23 ENCOUNTER — OFFICE VISIT (OUTPATIENT)
Dept: CARDIOLOGY | Facility: CLINIC | Age: 58
End: 2018-10-23

## 2018-10-23 VITALS
HEART RATE: 88 BPM | DIASTOLIC BLOOD PRESSURE: 102 MMHG | WEIGHT: 243 LBS | SYSTOLIC BLOOD PRESSURE: 140 MMHG | BODY MASS INDEX: 32.91 KG/M2 | HEIGHT: 72 IN | OXYGEN SATURATION: 98 %

## 2018-10-23 DIAGNOSIS — I25.119 CORONARY ARTERY DISEASE INVOLVING NATIVE CORONARY ARTERY OF NATIVE HEART WITH ANGINA PECTORIS (HCC): ICD-10-CM

## 2018-10-23 DIAGNOSIS — K21.9 GASTROESOPHAGEAL REFLUX DISEASE WITHOUT ESOPHAGITIS: ICD-10-CM

## 2018-10-23 DIAGNOSIS — E11.8 DIABETIC COMPLICATION (HCC): Primary | ICD-10-CM

## 2018-10-23 DIAGNOSIS — I77.9 BILATERAL CAROTID ARTERY DISEASE, UNSPECIFIED TYPE (HCC): ICD-10-CM

## 2018-10-23 DIAGNOSIS — E11.8 TYPE 2 DIABETES MELLITUS WITH COMPLICATION, UNSPECIFIED WHETHER LONG TERM INSULIN USE: ICD-10-CM

## 2018-10-23 DIAGNOSIS — R07.2 PRECORDIAL PAIN: ICD-10-CM

## 2018-10-23 DIAGNOSIS — I10 ESSENTIAL HYPERTENSION: ICD-10-CM

## 2018-10-23 PROCEDURE — 99214 OFFICE O/P EST MOD 30 MIN: CPT | Performed by: INTERNAL MEDICINE

## 2018-10-23 PROCEDURE — 93000 ELECTROCARDIOGRAM COMPLETE: CPT | Performed by: INTERNAL MEDICINE

## 2018-10-23 NOTE — TELEPHONE ENCOUNTER
"Pt called stating that he noticed a \"knot\" at incision site following heart cath last week. He states that there is no redness or inflammation. Per Dr. Ramey, pt is to come to office this afternoon for a site check. Pt notified, verbalized understanding.   "

## 2018-10-23 NOTE — PROGRESS NOTES
Carlos A Boyer Jr.  [id number]  1960  58 y.o.  male    Referring Provider: Vinayak Correia PA    Reason for Follow-up Visit: Here for routine follow up   coronary artery disease stented coronary artery   Type 2 diabetes mellitus   Recent cath  non obstructive coronary artery disease    Now complains of nodule right groin with mild tenderness and wanted to be seen      Subjective    Mild chronic exertional shortness of breath on exertion relieved with rest  No significant cough or wheezing  Going on for several months  No palpitations  Less associated chest pain    Feeling better on Imdur    No significant pedal edema  No fever or chills  No significant expectoration  No hemoptysis  No presyncope or syncope        History of present illness:  Carlos A Boyer Jr. is a 58 y.o. yo male with history of chest pain who presents today for   Chief Complaint   Patient presents with   • site check     s/p cath 10/15/18   .    History  Past Medical History:   Diagnosis Date   • Arthritis    • Asthma    • Carotid disease, bilateral (CMS/HCC)    • Chest pain    • Chronic ischemic heart disease    • Chronic sinusitis    • Maribell bullosa    • Coronary artery disease    • Deviated septum    • Diabetes mellitus (CMS/HCC)    • Difficulty urinating    • Diverticulitis    • Enlarged prostate    • Fatty liver    • GERD (gastroesophageal reflux disease)    • Hyperlipidemia    • Hypertension    • Hypertrophy of nasal turbinates    • Keratoderma    • Kidney stone    • Migraine    • Murmur, heart    • Myocardial infarction (CMS/HCC)    • Obesity    • PONV (postoperative nausea and vomiting)    • Psoriasis    • Seizures (CMS/HCC)    • Sinus congestion    • Sleep apnea    • SOB (shortness of breath)    • Stroke (CMS/HCC)    ,   Past Surgical History:   Procedure Laterality Date   • CARDIAC CATHETERIZATION  01/2016    Dr. Broadbent; widely patent previously placed stents in the left anterior descending and obstructive disease  involving the diagonal branch which was treated medically   • CARDIAC CATHETERIZATION N/A 7/14/2017    Procedure: Left Heart Cath;  Surgeon: Wade Ramey MD;  Location: [facility] CATH INVASIVE LOCATION;  Service:    • CARDIAC CATHETERIZATION Left 10/15/2018    Procedure: Cardiac Catheterization/Vascular Study;  Surgeon: Wade Ramey MD;  Location: [facility] CATH INVASIVE LOCATION;  Service: Cardiology   • CARDIAC CATHETERIZATION  10/15/2018    Procedure: Functional Flow [place];  Surgeon: Wade Ramey MD;  Location: [facility] CATH INVASIVE LOCATION;  Service: Cardiology   • CARDIAC CATHETERIZATION N/A 10/15/2018    Procedure: Left ventriculography;  Surgeon: Wade Ramey MD;  Location: [facility] CATH INVASIVE LOCATION;  Service: Cardiology   • CHOLECYSTECTOMY WITH INTRAOPERATIVE CHOLANGIOGRAM N/A 8/1/2018    Procedure: CHOLECYSTECTOMY LAPAROSCOPIC INTRAOPERATIVE CHOLANGIOGRAM;  Surgeon: Shane Ann MD;  Location: [facility] OR;  Service: General   • CORONARY ANGIOPLASTY     • CORONARY STENT PLACEMENT      x 6   • ENDOSCOPIC FUNCTIONAL SINUS SURGERY (FESS) Bilateral 12/13/2017    Procedure: PROCEDURE PERFORMED:  Bilateral functional endoscopic anterior ethmoidectomy with bilateral middle meatal antrostomy Septoplasty Right kathia bullosa resection Bilateral inferior turbinate reduction via Coblation;  Surgeon: Mayank Ibarra MD;  Location: [facility] OR;  Service:    • ENDOSCOPY N/A 7/30/2018    Procedure: ESOPHAGOGASTRODUODENOSCOPY WITH ANESTHESIA;  Surgeon: Benitez Mas MD;  Location: [facility] ENDOSCOPY;  Service: Gastroenterology   • HERNIA REPAIR      x2 inguinal area   • KIDNEY STONE SURGERY     • KNEE SURGERY Right    • OTHER SURGICAL HISTORY      urolift   • PROSTATE SURGERY      Dr. Badillo - 2017   • THUMB AMPUTATION Left     partial   • TOE AMPUTATION Right     big   ,   Family History   Problem Relation Age of Onset   • Heart disease Father    • No Known Problems Mother    ,   Social History   Substance Use  Topics   • Smoking status: Former Smoker     Years: 4.50     Types: Cigarettes     Quit date: 1993   • Smokeless tobacco: Former User     Types: Chew     Quit date: 2009   • Alcohol use No      Comment: quit 2009   ,     Medications  Current Outpatient Prescriptions   Medication Sig Dispense Refill   • albuterol (PROVENTIL HFA;VENTOLIN HFA) 108 (90 BASE) MCG/ACT inhaler Inhale 2 puffs Every 6 (Six) Hours As Needed for wheezing.     • aspirin 81 MG chewable tablet Chew 81 mg Daily.     • carboxymethylcellulose (REFRESH PLUS) 0.5 % solution Administer 1 drop to both eyes 4 (Four) Times a Day As Needed for Dry Eyes.     • dicyclomine (BENTYL) 20 MG tablet Take 1 tablet by mouth Every 6 (Six) Hours As Needed (abdominal pain). 8 tablet 0   • fluticasone (FLONASE) 50 MCG/ACT nasal spray 2 sprays into each nostril Daily.     • gabapentin (NEURONTIN) 100 MG capsule Take 1 capsule by mouth Every Night. 30 capsule 2   • HYDROcodone-acetaminophen (NORCO) 7.5-325 MG per tablet Take 1 tablet by mouth Every 4 (Four) Hours As Needed for Moderate Pain  for up to 40 doses. 40 tablet 0   • insulin aspart (novoLOG FLEXPEN) 100 UNIT/ML solution pen-injector sc pen Inject 25 Units under the skin 3 (Three) Times a Day Before Meals.     • Insulin Glargine (LANTUS SOLOSTAR) 100 UNIT/ML injection pen Inject  under the skin 2 (Two) Times a Day. 72 units @ night  60 units in morning     • isosorbide dinitrate (ISORDIL) 20 MG tablet Take 2 tablets by mouth 3 (Three) Times a Day. 270 tablet 3   • isosorbide dinitrate (ISORDIL) 30 MG tablet Take 1 tablet by mouth 3 (Three) Times a Day. 270 tablet 3   • lamoTRIgine (LaMICtal) 200 MG tablet Take 1 tablet by mouth 2 (Two) Times a Day. 180 tablet 1   • levETIRAcetam (KEPPRA) 500 MG tablet Take 3 tablets by mouth Daily. 270 tablet 1   • levETIRAcetam (KEPPRA) 500 MG tablet Take 4 tablets by mouth Every Night. 360 tablet 1   • Liraglutide (VICTOZA SC) Inject 1.2 Units under the skin into the  appropriate area as directed Every Night.     • lisinopril (PRINIVIL,ZESTRIL) 40 MG tablet Take 20 mg by mouth Every Night.     • metFORMIN (GLUCOPHAGE) 1000 MG tablet Take 1 tablet by mouth 2 (Two) Times a Day With Meals. HOLD x 48 hours post contrast. May resume 3/18/18     • metoprolol tartrate (LOPRESSOR) 100 MG tablet Take 50 mg by mouth Every 12 (Twelve) Hours.     • Multiple Vitamin (MULTI VITAMIN PO) Take 1 tablet by mouth Daily.     • naproxen sodium (ALEVE) 220 MG tablet Take 220 mg by mouth Daily As Needed for Headache.     • nitroglycerin (NITROSTAT) 0.4 MG SL tablet Place 0.4 mg under the tongue Every 5 (Five) Minutes As Needed for chest pain. Take no more than 3 doses in 15 minutes.     • ondansetron ODT (ZOFRAN-ODT) 4 MG disintegrating tablet Take 1 tablet by mouth Every 6 (Six) Hours As Needed for Nausea or Vomiting. 8 tablet 0   • pantoprazole (PROTONIX) 40 MG EC tablet Take 40 mg by mouth 2 (Two) Times a Day.     • psyllium (METAMUCIL) 58.6 % packet Take 1 packet by mouth Daily As Needed (constipation).     • rosuvastatin (CRESTOR) 40 MG tablet Take 20 mg by mouth Every Night.     • triamterene-hydrochlorothiazide (MAXZIDE) 75-50 MG per tablet Take 0.5 tablets by mouth Daily.     • urea (CARMOL) 40 % ointment Apply 1 application topically Daily As Needed for Dry Skin. Apply to heels.     • ZOFRAN 8 MG tablet Take 1 tablet by mouth Every 8 (Eight) Hours As Needed for Nausea or Vomiting for up to 10 doses. 10 tablet 1     No current facility-administered medications for this visit.        Allergies:  Flagyl [metronidazole]; Atorvastatin; and Ciprofloxacin    Review of Systems  Review of Systems   Constitution: Positive for weakness and malaise/fatigue.   HENT: Negative.    Eyes: Negative.    Cardiovascular: Positive for chest pain and dyspnea on exertion. Negative for claudication, cyanosis, irregular heartbeat, leg swelling, near-syncope, orthopnea, palpitations, paroxysmal nocturnal dyspnea and  "syncope.   Respiratory: Negative.    Endocrine: Negative.    Hematologic/Lymphatic: Negative.    Skin: Negative.    Musculoskeletal: Positive for arthritis and back pain.   Gastrointestinal: Negative for anorexia.   Genitourinary: Negative.    Neurological: Positive for dizziness and light-headedness.   Psychiatric/Behavioral: Negative.        Objective     Physical Exam:      Vitals:    10/23/18 1459 10/23/18 1540   BP: 140/96 (!) 140/102   Pulse: 88    SpO2: 98%    Weight: 110 kg (243 lb)    Height: 182.9 cm (72\")        BP (!) 140/102   Pulse 88   Ht 182.9 cm (72\")   Wt 110 kg (243 lb)   SpO2 98%   BMI 32.96 kg/m²   Physical Exam   Constitutional: He appears well-developed.   HENT:   Head: Normocephalic.   Neck: Normal carotid pulses and no JVD present. No tracheal tenderness present. Carotid bruit is not present. No tracheal deviation and no edema present.   Cardiovascular: Regular rhythm, normal heart sounds and normal pulses.    Pulmonary/Chest: Effort normal. No stridor.   Abdominal: Soft.   Neurological: He is alert. He has normal strength. No cranial nerve deficit or sensory deficit.   Skin: Skin is warm.   Psychiatric: He has a normal mood and affect. His speech is normal and behavior is normal.     Right groin: Arteriotomy site healthy,  No bleeding, swelling or excessive bruising. Preserved distal pulses.  Pea sized Mynx closure site nodule noted    Results Review:  Results for orders placed during the hospital encounter of 07/28/18   Adult Stress Echo W/ Cont or Stress Agent if Necessary Per Protocol    Narrative · Left ventricular systolic function is normal. Estimated EF = 55%.  · Abnormal stress echo with echocardiographic evidence for myocardial   ischemia located in the septal wall and apical wall.        7/17 cath  Conclusion  Nonocclusive epicardial coronary arteries with widely patent stents in the  left anterior descending coronary artery   artery and left circumflex coronary " artery.  Hyperdynamic left ventricle with ejection fraction of 75%.  LVEDP   17    mm Hg     Plan  Workup for noncardiac causes of chest pain this can be done as outpatient.  His d-dimer is within normal range  After a period of observation of stable can be discharged either later today.  Keep follow-up appointment with me  Ongoing risk factor modifications keep LDL below 70 mg/dL  Hydration   Observation       ECG 12 Lead  Date/Time: 10/23/2018 3:35 PM  Performed by: ZULEIKA DOYLE  Authorized by: ZULEIKA DOYLE   Comparison: compared with previous ECG from 9/25/2018  Similar to previous ECG  Rhythm: sinus rhythm  Rate: normal  Conduction: conduction normal  ST Segments: ST segments normal  T Waves: T waves normal  QRS axis: normal  Other: no other findings  Q waves: V1, V2, V3 and V4  Clinical impression: abnormal ECG            Assessment/Plan   Patient Active Problem List   Diagnosis   • Diarrhea due to drug   • Syncope and collapse   • Type 2 diabetes mellitus with complication (CMS/HCC)   • Gastroesophageal reflux disease without esophagitis   • Essential hypertension   • Seizure disorder (CMS/HCC)   • Carotid disease, bilateral (CMS/HCC)   • Coronary artery disease involving native coronary artery of native heart with angina pectoris (CMS/HCC)   • Chest pain, unspecified   • Mixed hyperlipidemia   • Chronic left arterial ischemic stroke, ICA (internal carotid artery)   • HOA on CPAP   • Chest pain   • Benign non-nodular prostatic hyperplasia with lower urinary tract symptoms   • Deviated nasal septum   • Maribell bullosa   • Hypertrophy of both inferior nasal turbinates   • Chronic sinusitis of both maxillary sinuses   • S/P FESS (functional endoscopic sinus surgery)   • Diabetic complication (CMS/HCC)   • Epigastric pain   • Precordial pain           Plan        Keep A1c less than 7 Primary to monitor  Keep LDL below 70 mg/dl. Monitor liver and renal functions.   Monitor CBC, CMP, TSH (as indicated) and Lipid  "Panel by primary      Increase antianginals as tolerated      No further evaluation for right groin Mynx closure site required    Increase Lisinopril to 40 mg daily    xxxxxxxxxxxxxxxxxxxxxxxxxxxxxxxxxxxxxxxxxxxxxxxxxxxxxxxxxxxxxxxxxxxxxxxxxxxxxxxxxxxxxxxxxxxxxxxxxxxxxxxxxxxxxxxxxxxxxxxxxxxxxxxxxxxxxxxxxxxxxxxxxxxxxxxxxxxxxxxxxxxxxxxxxxxxxxxxxxxxxxxxxxxxxxxxxxxxxxxxxxxxxxxxxxxxxxxxxxxxxxxxxxxxxxxxxxxxxxxxxxxxxxxx  Health maintenance    Low salt/ HTN/ Heart healthy carbohydrate restricted cardiac diet as applicable to this patient's current diagnoses.   This handout has relevant information regarding shopping for food, preparing meals, what to eat at restaurants, tracking of food intake, information regarding sodium intake and salt content, how to read food labels, knowing what to eat, tips reagarding physical activity, calorie count and calorie expenditure. What foods to avoid. Information regarding alcoholic drinks along with \"good\" and \"bad\" fats.  Reiterated prior recommendations     The patient is advised to reduce or avoid caffeine or other cardiac stimulants.     The patient was advised that NSAID-type medications have three  very important potential side effects: cardiovascular complications, gastrointestinal irritation including hemorrhage and renal injuries.  Do not use anti-inflammatories such as Motrin/ibuprofen, Alleve/naprosyn, Mobic and like medications Use Tylenol instead.The patient expresses understanding of these issues and questions were answered.   Monitor for any signs of bleeding including red or dark stools. Fall precautions.       Monitor for any signs of bleeding including red or dark stools. Fall precautions.   Patient is asked to monitor BP at home or work, several times per month and return with written values at next office visit.     Advised staying uptodate with immunizations per established standard guidelines.      Offered to give patient  a copy of my notes and relevant tests/ prior " ECG etc for the patient to review and follow specific advise and relevant findings if any, prognosis, along with my current and future plans.      Questions were encouraged, asked and answered to the patient's  understanding and satisfaction. Questions if any regarding current medications and side effects, need for refills and importance of compliance to medications stressed.    Reviewed available prior notes, consults, prior visits, laboratory findings, radiology and cardiology relevant reports. Updated chart as applicable. I have reviewed the patient's medical history in detail and updated the computerized patient record as relevant.      Updated patient regarding any new or relevant abnormalities on review of records or any new findings on physical exam. Mentioned to patient about purpose of visit and desirable health short and long term goals and objectives.        xxxxxxxxxxxxxxxxxxxxxxxxxxxxxxxxxxxxxxxxxxxxxxxxxxxxxxxxxxxxxxxxxxxxxxxxxxxxxxxxxxxxxxxxxxxxxxxxxxxxxxxxxxxxxxxxxxxxxxxxxxxxxxxxxxxxxxxxxxxxxxxxxxxxxxxxxxxxxxxxxxxxxxxxxxxxxxxxxxxxxxxxxxxxxxxxxxxxxxxxxxxxxxxxxxxxxxxxxxxxxxxxxxxxxxxxxxxxxxxxxxxxxxxx             Return in about 6 weeks (around 12/4/2018).

## 2018-11-05 ENCOUNTER — OFFICE VISIT (OUTPATIENT)
Dept: NEUROLOGY | Facility: CLINIC | Age: 58
End: 2018-11-05

## 2018-11-05 VITALS
DIASTOLIC BLOOD PRESSURE: 76 MMHG | BODY MASS INDEX: 33.59 KG/M2 | SYSTOLIC BLOOD PRESSURE: 120 MMHG | WEIGHT: 248 LBS | HEART RATE: 82 BPM | HEIGHT: 72 IN

## 2018-11-05 DIAGNOSIS — G47.33 OSA ON CPAP: ICD-10-CM

## 2018-11-05 DIAGNOSIS — I69.30 CHRONIC LEFT ARTERIAL ISCHEMIC STROKE, ICA (INTERNAL CAROTID ARTERY): ICD-10-CM

## 2018-11-05 DIAGNOSIS — Z99.89 OSA ON CPAP: ICD-10-CM

## 2018-11-05 DIAGNOSIS — R55 SYNCOPE AND COLLAPSE: Primary | ICD-10-CM

## 2018-11-05 DIAGNOSIS — G40.909 SEIZURE DISORDER (HCC): ICD-10-CM

## 2018-11-05 DIAGNOSIS — E11.42 DIABETIC PERIPHERAL NEUROPATHY (HCC): ICD-10-CM

## 2018-11-05 PROCEDURE — 99213 OFFICE O/P EST LOW 20 MIN: CPT | Performed by: CLINICAL NURSE SPECIALIST

## 2018-11-05 NOTE — PROGRESS NOTES
Subjective     Chief Complaint   Patient presents with   • Seizures     no sz   • Peripheral Neuropathy     pt states neurotin is helping   • Syncope     no syncope       Carlos A Boyer Jr. is a 58 y.o. male  ambidextrous retiree, .  He is here today for follow up for seizure and syncopal events. He is by himself today. He was last seen 9/21/2018. He denies further syncopal spells. He denies seizures or tremors. No LOC. No tongue biting, bowel/bladder incontinence. He did have a cholecystectomy 7/28/18. He reports compliance with CPAP. He has hx of CAD and did undergo hearth catheterization and per patient no intervention done because vessels too small.    He did have labs done since last visit as he was complaining of LE numbness and shooting pains in his feet. He was also started on Gabapentin 100 mg at HS. Labs were unremarkable. He tells me gabapentin has helped his symptoms tremendously and wishes to continue. He does have some breakthrough sx at times but for the most part this dose is effective.       As you recall, He is a prior patient of Dr. REJI Roa.  He had an extensive work up at [facility] and found to have seizure disorder. He has been taking Keppra and Lamictal ever since. Patient also has hx of left hemispheric stroke with no residual.   9/21/18 had complaint of LE numbness and shooting pains in his feet described below. He has hx of DM and A1C has improved to 7.2. He denies falls. He has not had work up for this before. He denies back pain.        Seizures    This is a chronic (2012, would occur 2-4 times a month and last up to 10 mintues) problem. Episode onset: last episode was about April 2017. Duration: last about 5 minutes now with medications. Associated symptoms include headaches. Pertinent negatives include no confusion, no visual disturbance, no chest pain, no nausea, no vomiting and no diarrhea. Characteristics include rhythmic jerking. Characteristics do  not include bowel incontinence, bladder incontinence, loss of consciousness, bit tongue or apnea. never had LOC, would have tremoring from inside out, and would have tremor in both arms and rarely in legs. would have staring , can hear Associated symptoms comments: fatigue.   Peripheral Neuropathy   This is a chronic problem. Episode onset: since 2016 worse since June 2018. The problem occurs every several days (4-5 times per week and mostly at night). Associated symptoms include headaches. Pertinent negatives include no arthralgias, chest pain, fatigue, fever, myalgias, nausea, vomiting or weakness. Associated symptoms comments: Shooting pains, burning sensations, numbness in toes. When barefoot feels like walking on pipo. Shooting pains can last about 1 hour and does wake from sleep. Exacerbated by: DM, dyslipidemia, obesity. He has tried nothing (A1C improved) for the symptoms.   Syncope   This is a new problem. The current episode started in the past 7 days. Episode frequency: at least 3 times since 1/3/17. The problem has been resolved. He lost consciousness for a period of 1 to 5 minutes (no more than 3 minutes). Exacerbated by: severe forceful cough. Associated symptoms include back pain and headaches. Pertinent negatives include no bladder incontinence, bowel incontinence, chest pain, confusion, dizziness, fever, nausea, vomiting or weakness. He has tried nothing for the symptoms. The treatment provided no relief. His past medical history is significant for HTN and seizures. leslye and has not used CPAP because of nasal surgery.        Current Outpatient Prescriptions   Medication Sig Dispense Refill   • albuterol (PROVENTIL HFA;VENTOLIN HFA) 108 (90 BASE) MCG/ACT inhaler Inhale 2 puffs Every 6 (Six) Hours As Needed for wheezing.     • aspirin 81 MG chewable tablet Chew 81 mg Daily.     • carboxymethylcellulose (REFRESH PLUS) 0.5 % solution Administer 1 drop to both eyes 4 (Four) Times a Day As Needed for  Dry Eyes.     • dicyclomine (BENTYL) 20 MG tablet Take 1 tablet by mouth Every 6 (Six) Hours As Needed (abdominal pain). 8 tablet 0   • fluticasone (FLONASE) 50 MCG/ACT nasal spray 2 sprays into each nostril Daily.     • gabapentin (NEURONTIN) 100 MG capsule Take 1 capsule by mouth Every Night. 30 capsule 2   • HYDROcodone-acetaminophen (NORCO) 7.5-325 MG per tablet Take 1 tablet by mouth Every 4 (Four) Hours As Needed for Moderate Pain  for up to 40 doses. 40 tablet 0   • insulin aspart (novoLOG FLEXPEN) 100 UNIT/ML solution pen-injector sc pen Inject 25 Units under the skin 3 (Three) Times a Day Before Meals.     • Insulin Glargine (LANTUS SOLOSTAR) 100 UNIT/ML injection pen Inject  under the skin 2 (Two) Times a Day. 72 units @ night  60 units in morning     • isosorbide dinitrate (ISORDIL) 30 MG tablet Take 1 tablet by mouth 3 (Three) Times a Day. 270 tablet 3   • lamoTRIgine (LaMICtal) 200 MG tablet Take 1 tablet by mouth 2 (Two) Times a Day. 180 tablet 1   • levETIRAcetam (KEPPRA) 500 MG tablet Take 3 tablets by mouth Daily. 270 tablet 1   • levETIRAcetam (KEPPRA) 500 MG tablet Take 4 tablets by mouth Every Night. 360 tablet 1   • Liraglutide (VICTOZA SC) Inject 1.2 Units under the skin into the appropriate area as directed Every Night.     • lisinopril (PRINIVIL,ZESTRIL) 40 MG tablet Take 20 mg by mouth Every Night.     • metFORMIN (GLUCOPHAGE) 1000 MG tablet Take 1 tablet by mouth 2 (Two) Times a Day With Meals. HOLD x 48 hours post contrast. May resume 3/18/18     • metoprolol tartrate (LOPRESSOR) 100 MG tablet Take 50 mg by mouth Every 12 (Twelve) Hours.     • Multiple Vitamin (MULTI VITAMIN PO) Take 1 tablet by mouth Daily.     • naproxen sodium (ALEVE) 220 MG tablet Take 220 mg by mouth Daily As Needed for Headache.     • nitroglycerin (NITROSTAT) 0.4 MG SL tablet Place 0.4 mg under the tongue Every 5 (Five) Minutes As Needed for chest pain. Take no more than 3 doses in 15 minutes.     • ondansetron ODT  (ZOFRAN-ODT) 4 MG disintegrating tablet Take 1 tablet by mouth Every 6 (Six) Hours As Needed for Nausea or Vomiting. 8 tablet 0   • pantoprazole (PROTONIX) 40 MG EC tablet Take 40 mg by mouth 2 (Two) Times a Day.     • psyllium (METAMUCIL) 58.6 % packet Take 1 packet by mouth Daily As Needed (constipation).     • rosuvastatin (CRESTOR) 40 MG tablet Take 20 mg by mouth Every Night.     • triamterene-hydrochlorothiazide (MAXZIDE) 75-50 MG per tablet Take 0.5 tablets by mouth Daily.     • urea (CARMOL) 40 % ointment Apply 1 application topically Daily As Needed for Dry Skin. Apply to heels.     • ZOFRAN 8 MG tablet Take 1 tablet by mouth Every 8 (Eight) Hours As Needed for Nausea or Vomiting for up to 10 doses. 10 tablet 1     No current facility-administered medications for this visit.        Past Medical History:   Diagnosis Date   • Arthritis    • Asthma    • Carotid disease, bilateral (CMS/HCC)    • Chest pain    • Chronic ischemic heart disease    • Chronic sinusitis    • Maribell bullosa    • Coronary artery disease    • Deviated septum    • Diabetes mellitus (CMS/HCC)    • Difficulty urinating    • Diverticulitis    • Enlarged prostate    • Fatty liver    • GERD (gastroesophageal reflux disease)    • Hyperlipidemia    • Hypertension    • Hypertrophy of nasal turbinates    • Keratoderma    • Kidney stone    • Migraine    • Murmur, heart    • Myocardial infarction (CMS/HCC)    • Obesity    • PONV (postoperative nausea and vomiting)    • Psoriasis    • Seizures (CMS/HCC)    • Sinus congestion    • Sleep apnea    • SOB (shortness of breath)    • Stroke (CMS/HCC)        Past Surgical History:   Procedure Laterality Date   • CARDIAC CATHETERIZATION  01/2016    Dr. Broadbent; widely patent previously placed stents in the left anterior descending and obstructive disease involving the diagonal branch which was treated medically   • CARDIAC CATHETERIZATION N/A 7/14/2017    Procedure: Left Heart Cath;  Surgeon: Wade Ramey  MD;  Location: [facility] CATH INVASIVE LOCATION;  Service:    • CARDIAC CATHETERIZATION Left 10/15/2018    Procedure: Cardiac Catheterization/Vascular Study;  Surgeon: Wade Ramey MD;  Location: [facility] CATH INVASIVE LOCATION;  Service: Cardiology   • CARDIAC CATHETERIZATION  10/15/2018    Procedure: Functional Flow [place];  Surgeon: Wade Ramey MD;  Location: [facility] CATH INVASIVE LOCATION;  Service: Cardiology   • CARDIAC CATHETERIZATION N/A 10/15/2018    Procedure: Left ventriculography;  Surgeon: Wade Ramey MD;  Location: [facility] CATH INVASIVE LOCATION;  Service: Cardiology   • CHOLECYSTECTOMY WITH INTRAOPERATIVE CHOLANGIOGRAM N/A 8/1/2018    Procedure: CHOLECYSTECTOMY LAPAROSCOPIC INTRAOPERATIVE CHOLANGIOGRAM;  Surgeon: Shane Ann MD;  Location: [facility] OR;  Service: General   • CORONARY ANGIOPLASTY     • CORONARY STENT PLACEMENT      x 6   • ENDOSCOPIC FUNCTIONAL SINUS SURGERY (FESS) Bilateral 12/13/2017    Procedure: PROCEDURE PERFORMED:  Bilateral functional endoscopic anterior ethmoidectomy with bilateral middle meatal antrostomy Septoplasty Right kathia bullosa resection Bilateral inferior turbinate reduction via Coblation;  Surgeon: Mayank Ibarra MD;  Location: [facility] OR;  Service:    • ENDOSCOPY N/A 7/30/2018    Procedure: ESOPHAGOGASTRODUODENOSCOPY WITH ANESTHESIA;  Surgeon: Benitez Mas MD;  Location: [facility] ENDOSCOPY;  Service: Gastroenterology   • HERNIA REPAIR      x2 inguinal area   • KIDNEY STONE SURGERY     • KNEE SURGERY Right    • OTHER SURGICAL HISTORY      urolift   • PROSTATE SURGERY      Dr. Badillo - 2017   • THUMB AMPUTATION Left     partial   • TOE AMPUTATION Right     big       family history includes Heart disease in his father; No Known Problems in his mother.    Social History   Substance Use Topics   • Smoking status: Former Smoker     Years: 4.50     Types: Cigarettes     Quit date: 1993   • Smokeless tobacco: Former User     Types: Chew     Quit date: 2009   •  "Alcohol use No      Comment: quit 2009       Review of Systems   Constitutional: Negative.  Negative for fatigue and fever.   HENT: Positive for sinus pressure. Negative for hearing loss and postnasal drip.    Eyes: Negative.  Negative for visual disturbance.   Respiratory: Negative.  Negative for apnea, chest tightness and shortness of breath.    Cardiovascular: Positive for syncope. Negative for chest pain.   Gastrointestinal: Negative.  Negative for bowel incontinence, constipation, diarrhea, nausea and vomiting.   Endocrine: Negative.    Genitourinary: Negative.  Negative for bladder incontinence, dysuria and frequency.   Musculoskeletal: Positive for back pain. Negative for arthralgias, gait problem and myalgias.   Skin: Negative.    Allergic/Immunologic: Negative.    Neurological: Positive for seizures, syncope and headaches. Negative for dizziness, tremors, loss of consciousness and weakness.   Hematological: Negative.  Negative for adenopathy.   Psychiatric/Behavioral: Negative.  Negative for agitation, confusion and hallucinations.   All other systems reviewed and are negative.      Objective     /76   Pulse 82   Ht 182.9 cm (72\")   Wt 112 kg (248 lb)   BMI 33.63 kg/m² , Body mass index is 33.63 kg/m².    Physical Exam   Constitutional: He is oriented to person, place, and time. Vital signs are normal. He appears well-developed and well-nourished. He is cooperative.   HENT:   Head: Normocephalic.   Right Ear: Hearing and external ear normal.   Left Ear: Hearing and external ear normal.   Nose: Nose normal.   Mouth/Throat: Oropharynx is clear and moist.   Eyes: Pupils are equal, round, and reactive to light. Conjunctivae, EOM and lids are normal. Right eye exhibits normal extraocular motion and no nystagmus. Left eye exhibits normal extraocular motion and no nystagmus. Right pupil is round and reactive. Left pupil is round and reactive. Pupils are equal.   Neck: Normal range of motion. Neck supple. " Carotid bruit is not present.   Cardiovascular: Normal rate, regular rhythm and normal heart sounds.    No murmur heard.  Pulmonary/Chest: Effort normal and breath sounds normal. He has no decreased breath sounds. He has no rhonchi.   Abdominal: Soft. Bowel sounds are normal.   Musculoskeletal: Normal range of motion.       Neurological Sensory Findings -  Altered sharp/dull right ankle/foot discrimination (decrease pin/vibration distally to the ankle) and altered sharp/dull left ankle/foot discrimination (decrease pin/vibration distally to the ankle).  Neurological: He is alert and oriented to person, place, and time. He has normal strength and normal reflexes. He displays no tremor. No cranial nerve deficit or sensory deficit. He exhibits normal muscle tone. Coordination (patient has tendency to sway standing with eyes closed., no ataxia, FTN, HTS intact bilateral) abnormal. Gait normal.   Reflex Scores:       Tricep reflexes are 2+ on the right side and 2+ on the left side.       Bicep reflexes are 2+ on the right side and 2+ on the left side.       Brachioradialis reflexes are 2+ on the right side and 2+ on the left side.       Patellar reflexes are 2+ on the right side and 2+ on the left side.       Achilles reflexes are 2+ on the right side and 2+ on the left side.  Awake, alert. No aphasia, no dysarthria  Completes simple and complex commands    CN II:  Visual fields full.  Pupils equally reactive to light  CN III, IV, VI:  Extraocular Muscles full with no signs of nystagmus  CN V:  Facial sensory is symmetric with no asymetries.  CN VII:  Facial motor symmetric  CN VIII:  Gross hearing intact bilaterally  CN IX:  Palate elevates symmetrically  CN X:  Palate elevates symmetrically  CN XI:  Shoulder shrug symmetric  CN XII:  Tongue is midline on protrusion    Full and symmetric strength bilateral upper and lower extremities.   Skin: Skin is warm and dry.   Psychiatric: He has a normal mood and affect. His  speech is normal and behavior is normal. Cognition and memory are normal.   Nursing note and vitals reviewed.      Results for orders placed or performed during the hospital encounter of 10/15/18   Comprehensive metabolic panel   Result Value Ref Range    Glucose 173 (H) 70 - 100 mg/dL    BUN 20 5 - 21 mg/dL    Creatinine 1.22 0.50 - 1.40 mg/dL    Sodium 140 135 - 145 mmol/L    Potassium 4.4 3.5 - 5.3 mmol/L    Chloride 101 98 - 110 mmol/L    CO2 26.0 24.0 - 31.0 mmol/L    Calcium 10.3 8.4 - 10.4 mg/dL    Total Protein 8.3 6.3 - 8.7 g/dL    Albumin 4.80 3.50 - 5.00 g/dL    ALT (SGPT) 33 0 - 54 U/L    AST (SGOT) 35 7 - 45 U/L    Alkaline Phosphatase 102 24 - 120 U/L    Total Bilirubin 1.1 (H) 0.1 - 1.0 mg/dL    eGFR Non African Amer 61 >60 mL/min/1.73    Globulin 3.5 gm/dL    A/G Ratio 1.4 1.1 - 2.5 g/dL    BUN/Creatinine Ratio 16.4 7.0 - 25.0    Anion Gap 13.0 4.0 - 13.0 mmol/L   CBC Auto Differential   Result Value Ref Range    WBC 5.66 4.80 - 10.80 10*3/mm3    RBC 5.26 4.80 - 5.90 10*6/mm3    Hemoglobin 16.0 14.0 - 18.0 g/dL    Hematocrit 45.9 40.0 - 52.0 %    MCV 87.3 82.0 - 95.0 fL    MCH 30.4 28.0 - 32.0 pg    MCHC 34.9 33.0 - 36.0 g/dL    RDW 12.7 12.0 - 15.0 %    RDW-SD 40.6 40.0 - 54.0 fl    MPV 11.3 6.0 - 12.0 fL    Platelets 133 130 - 400 10*3/mm3    Neutrophil % 51.4 39.0 - 78.0 %    Lymphocyte % 35.7 15.0 - 45.0 %    Monocyte % 8.5 4.0 - 12.0 %    Eosinophil % 3.7 0.0 - 4.0 %    Basophil % 0.5 0.0 - 2.0 %    Immature Grans % 0.2 0.0 - 5.0 %    Neutrophils, Absolute 2.91 1.87 - 8.40 10*3/mm3    Lymphocytes, Absolute 2.02 0.72 - 4.86 10*3/mm3    Monocytes, Absolute 0.48 0.19 - 1.30 10*3/mm3    Eosinophils, Absolute 0.21 0.00 - 0.70 10*3/mm3    Basophils, Absolute 0.03 0.00 - 0.20 10*3/mm3    Immature Grans, Absolute 0.01 0.00 - 0.03 10*3/mm3    nRBC 0.0 0.0 - 0.0 /100 WBC        ASSESSMENT/PLAN    Diagnoses and all orders for this visit:    Syncope and collapse    Chronic left arterial ischemic stroke, ICA  (internal carotid artery)    HOA on CPAP    Seizure disorder (CMS/HCC)    Diabetic peripheral neuropathy (CMS/[facility])      MEDICAL DECISION MAKIN. Continue with CPAP   2.  Continue with Keppra 1500 mg in AM and 2000 mg in PM  3. Continue with lamictal 200 mg BID per PCP  4.  bp managed by PCP  5. Blood glucose managed by PCP and A1C goal less than 7  6. Statin per PCP for LDL goal less than 70.  7. Continue with ASA for secondary stroke prevention  8. Patient is counseled on stroke signs and symptoms using FAST and Time Saved is Brain Saved.  9. Check labs looking for correctable cause of neuropathy but likely related to DM.  10.Seizure precautions were discussed to include no tub baths, no swimming, avoiding lack of sleep, and avoiding known triggers. Education given of things that may contribute to a seizure to include, but not limited to: stressful situations, fever, fatigue, lack of sleep, low blood sugar, hyperventilation, flashing lights, and caffeine. Instructions given to take seizure medications as prescribed. Education given to family member on what to do during a seizure and care following the seizure. Education given to contact this office prior to stopping or changing any medications.  11. Former smoker, currently not using tobacco  12.ONTINUE gabapentin 100 mg at HS and counseled on side effects     allergies and all known medications/prescriptions have been reviewed using resources available on this encounter.    Return in about 6 months (around 2019).        TEENA Win

## 2018-12-07 ENCOUNTER — OFFICE VISIT (OUTPATIENT)
Dept: CARDIOLOGY | Facility: CLINIC | Age: 58
End: 2018-12-07

## 2018-12-07 VITALS
OXYGEN SATURATION: 97 % | SYSTOLIC BLOOD PRESSURE: 116 MMHG | WEIGHT: 249 LBS | HEART RATE: 74 BPM | HEIGHT: 72 IN | BODY MASS INDEX: 33.72 KG/M2 | DIASTOLIC BLOOD PRESSURE: 80 MMHG

## 2018-12-07 DIAGNOSIS — I25.119 CORONARY ARTERY DISEASE INVOLVING NATIVE CORONARY ARTERY OF NATIVE HEART WITH ANGINA PECTORIS (HCC): ICD-10-CM

## 2018-12-07 DIAGNOSIS — E11.8 DIABETIC COMPLICATION (HCC): ICD-10-CM

## 2018-12-07 DIAGNOSIS — I77.9 BILATERAL CAROTID ARTERY DISEASE, UNSPECIFIED TYPE (HCC): ICD-10-CM

## 2018-12-07 DIAGNOSIS — R07.2 PRECORDIAL CHEST PAIN: Primary | ICD-10-CM

## 2018-12-07 DIAGNOSIS — I10 ESSENTIAL HYPERTENSION: ICD-10-CM

## 2018-12-07 DIAGNOSIS — E11.42 DIABETIC PERIPHERAL NEUROPATHY (HCC): ICD-10-CM

## 2018-12-07 PROBLEM — R07.9 CHEST PAIN: Status: RESOLVED | Noted: 2017-07-13 | Resolved: 2018-12-07

## 2018-12-07 PROBLEM — R07.9 CHEST PAIN: Status: RESOLVED | Noted: 2017-02-01 | Resolved: 2018-12-07

## 2018-12-07 PROCEDURE — 99214 OFFICE O/P EST MOD 30 MIN: CPT | Performed by: INTERNAL MEDICINE

## 2018-12-07 NOTE — PROGRESS NOTES
Carlos A Boyer Jr.  [id number]  1960  58 y.o.  male    Referring Provider: Vinayak Correia PA    Reason for Follow-up Visit: Here for routine follow up   coronary artery disease stented coronary artery   Type 2 diabetes mellitus    nodule right groin with mild tenderness after cath now completely resolved      Subjective    Mild chronic exertional shortness of breath on exertion relieved with rest  No significant cough or wheezing  Going on for several months  No palpitations  Less associated chest pain    Feeling better on Imdur    Atypical chest pain   Non exertional chest pain     No significant pedal edema  No fever or chills  No significant expectoration  No hemoptysis  No presyncope or syncope        History of present illness:  Carlos A Boyer Jr. is a 58 y.o. yo male with history of chest pain who presents today for   Chief Complaint   Patient presents with   • Coronary Artery Disease     6 WKS FU    • Shortness of Breath     USE CPAP- HAS ASTHMA    • Fatigue   .    History  Past Medical History:   Diagnosis Date   • Arthritis    • Asthma    • Carotid disease, bilateral (CMS/HCC)    • Chest pain    • Chronic ischemic heart disease    • Chronic sinusitis    • Maribell bullosa    • Coronary artery disease    • Deviated septum    • Diabetes mellitus (CMS/HCC)    • Difficulty urinating    • Diverticulitis    • Enlarged prostate    • Fatty liver    • GERD (gastroesophageal reflux disease)    • Hyperlipidemia    • Hypertension    • Hypertrophy of nasal turbinates    • Keratoderma    • Kidney stone    • Migraine    • Murmur, heart    • Myocardial infarction (CMS/HCC)    • Obesity    • PONV (postoperative nausea and vomiting)    • Psoriasis    • Seizures (CMS/HCC)    • Sinus congestion    • Sleep apnea    • SOB (shortness of breath)    • Stroke (CMS/HCC)    ,   Past Surgical History:   Procedure Laterality Date   • CARDIAC CATHETERIZATION  01/2016    Dr. Broadbent; widely patent previously placed stents in  the left anterior descending and obstructive disease involving the diagonal branch which was treated medically   • CORONARY ANGIOPLASTY     • CORONARY STENT PLACEMENT      x 6   • HERNIA REPAIR      x2 inguinal area   • KIDNEY STONE SURGERY     • KNEE SURGERY Right    • OTHER SURGICAL HISTORY      urolift   • PROSTATE SURGERY      Dr. Badillo - 2017   • THUMB AMPUTATION Left     partial   • TOE AMPUTATION Right     big   ,   Family History   Problem Relation Age of Onset   • Heart disease Father    • No Known Problems Mother    ,   Social History     Tobacco Use   • Smoking status: Former Smoker     Years: 4.50     Types: Cigarettes     Last attempt to quit:      Years since quittin.9   • Smokeless tobacco: Former User     Types: Chew     Quit date:    Substance Use Topics   • Alcohol use: No     Comment: quit    • Drug use: No   ,     Medications  Current Outpatient Medications   Medication Sig Dispense Refill   • albuterol (PROVENTIL HFA;VENTOLIN HFA) 108 (90 BASE) MCG/ACT inhaler Inhale 2 puffs Every 6 (Six) Hours As Needed for wheezing.     • aspirin 81 MG chewable tablet Chew 81 mg Daily.     • carboxymethylcellulose (REFRESH PLUS) 0.5 % solution Administer 1 drop to both eyes 4 (Four) Times a Day As Needed for Dry Eyes.     • dicyclomine (BENTYL) 20 MG tablet Take 1 tablet by mouth Every 6 (Six) Hours As Needed (abdominal pain). 8 tablet 0   • fluticasone (FLONASE) 50 MCG/ACT nasal spray 2 sprays into each nostril Daily.     • gabapentin (NEURONTIN) 100 MG capsule Take 1 capsule by mouth Every Night. 30 capsule 2   • HYDROcodone-acetaminophen (NORCO) 7.5-325 MG per tablet Take 1 tablet by mouth Every 4 (Four) Hours As Needed for Moderate Pain  for up to 40 doses. 40 tablet 0   • insulin aspart (novoLOG FLEXPEN) 100 UNIT/ML solution pen-injector sc pen Inject 27 Units under the skin into the appropriate area as directed 3 (Three) Times a Day Before Meals.     • Insulin Glargine (LANTUS SOLOSTAR)  100 UNIT/ML injection pen Inject  under the skin 2 (Two) Times a Day. 72 units @ night  60 units in morning     • isosorbide dinitrate (ISORDIL) 30 MG tablet Take 1 tablet by mouth 3 (Three) Times a Day. 270 tablet 3   • lamoTRIgine (LaMICtal) 200 MG tablet Take 1 tablet by mouth 2 (Two) Times a Day. 180 tablet 1   • levETIRAcetam (KEPPRA) 500 MG tablet Take 3 tablets by mouth Daily. 270 tablet 1   • levETIRAcetam (KEPPRA) 500 MG tablet Take 4 tablets by mouth Every Night. 360 tablet 1   • Liraglutide (VICTOZA SC) Inject 1.2 Units under the skin into the appropriate area as directed Every Night.     • lisinopril (PRINIVIL,ZESTRIL) 40 MG tablet Take 20 mg by mouth Every Night.     • metFORMIN (GLUCOPHAGE) 1000 MG tablet Take 1 tablet by mouth 2 (Two) Times a Day With Meals. HOLD x 48 hours post contrast. May resume 3/18/18     • metoprolol tartrate (LOPRESSOR) 100 MG tablet Take 50 mg by mouth Every 12 (Twelve) Hours.     • Multiple Vitamin (MULTI VITAMIN PO) Take 1 tablet by mouth Daily.     • naproxen sodium (ALEVE) 220 MG tablet Take 220 mg by mouth Daily As Needed for Headache.     • nitroglycerin (NITROSTAT) 0.4 MG SL tablet Place 0.4 mg under the tongue Every 5 (Five) Minutes As Needed for chest pain. Take no more than 3 doses in 15 minutes.     • ondansetron ODT (ZOFRAN-ODT) 4 MG disintegrating tablet Take 1 tablet by mouth Every 6 (Six) Hours As Needed for Nausea or Vomiting. 8 tablet 0   • pantoprazole (PROTONIX) 40 MG EC tablet Take 40 mg by mouth 2 (Two) Times a Day.     • psyllium (METAMUCIL) 58.6 % packet Take 1 packet by mouth Daily As Needed (constipation).     • rosuvastatin (CRESTOR) 40 MG tablet Take 20 mg by mouth Every Night.     • triamterene-hydrochlorothiazide (MAXZIDE) 75-50 MG per tablet Take 0.5 tablets by mouth Daily.     • urea (CARMOL) 40 % ointment Apply 1 application topically Daily As Needed for Dry Skin. Apply to heels.     • ZOFRAN 8 MG tablet Take 1 tablet by mouth Every 8 (Eight)  "Hours As Needed for Nausea or Vomiting for up to 10 doses. 10 tablet 1     No current facility-administered medications for this visit.        Allergies:  Flagyl [metronidazole]; Atorvastatin; and Ciprofloxacin    Review of Systems  Review of Systems   Constitution: Positive for weakness and malaise/fatigue.   HENT: Negative.    Eyes: Negative.    Cardiovascular: Positive for chest pain and dyspnea on exertion. Negative for claudication, cyanosis, irregular heartbeat, leg swelling, near-syncope, orthopnea, palpitations, paroxysmal nocturnal dyspnea and syncope.   Respiratory: Negative.    Endocrine: Negative.    Hematologic/Lymphatic: Negative.    Skin: Negative.    Musculoskeletal: Positive for arthritis and back pain.   Gastrointestinal: Negative for anorexia.   Genitourinary: Negative.    Neurological: Positive for dizziness and light-headedness.   Psychiatric/Behavioral: Negative.        Objective     Physical Exam:      Vitals:    12/07/18 1212   BP: 116/80   BP Location: Left arm   Patient Position: Sitting   Cuff Size: Adult   Pulse: 74   SpO2: 97%   Weight: 113 kg (249 lb)   Height: 182.9 cm (72.01\")       /80 (BP Location: Left arm, Patient Position: Sitting, Cuff Size: Adult)   Pulse 74   Ht 182.9 cm (72.01\")   Wt 113 kg (249 lb)   SpO2 97%   BMI 33.76 kg/m²   Physical Exam   Constitutional: He appears well-developed.   HENT:   Head: Normocephalic.   Neck: Normal carotid pulses and no JVD present. No tracheal tenderness present. Carotid bruit is not present. No tracheal deviation and no edema present.   Cardiovascular: Regular rhythm, normal heart sounds and normal pulses.   Pulmonary/Chest: Effort normal. No stridor.   Abdominal: Soft.   Neurological: He is alert. He has normal strength. No cranial nerve deficit or sensory deficit.   Skin: Skin is warm.   Psychiatric: He has a normal mood and affect. His speech is normal and behavior is normal.     Right groin: Arteriotomy site healthy,  No " bleeding, swelling or excessive bruising. Preserved distal pulses.  Pea sized Mynx closure site nodule noted    Results Review:  Results for orders placed during the hospital encounter of 07/28/18   Adult Stress Echo W/ Cont or Stress Agent if Necessary Per Protocol    Narrative · Left ventricular systolic function is normal. Estimated EF = 55%.  · Abnormal stress echo with echocardiographic evidence for myocardial   ischemia located in the septal wall and apical wall.        7/17 cath  Conclusion  Nonocclusive epicardial coronary arteries with widely patent stents in the  left anterior descending coronary artery   artery and left circumflex coronary artery.  Hyperdynamic left ventricle with ejection fraction of 75%.  LVEDP   17    mm Hg     Plan  Workup for noncardiac causes of chest pain this can be done as outpatient.  His d-dimer is within normal range  After a period of observation of stable can be discharged either later today.  Keep follow-up appointment with me  Ongoing risk factor modifications keep LDL below 70 mg/dL  Hydration   Observation     Procedures    Assessment/Plan   Patient Active Problem List   Diagnosis   • Diarrhea due to drug   • Syncope and collapse   • Type 2 diabetes mellitus with complication (CMS/HCC)   • Gastroesophageal reflux disease without esophagitis   • Essential hypertension   • Seizure disorder (CMS/HCC)   • Carotid disease, bilateral (CMS/HCC)   • Coronary artery disease involving native coronary artery of native heart with angina pectoris (CMS/HCC)   • Mixed hyperlipidemia   • Chronic left arterial ischemic stroke, ICA (internal carotid artery)   • HOA on CPAP   • Benign non-nodular prostatic hyperplasia with lower urinary tract symptoms   • Deviated nasal septum   • Maribell bullosa   • Hypertrophy of both inferior nasal turbinates   • Chronic sinusitis of both maxillary sinuses   • S/P FESS (functional endoscopic sinus surgery)   • Diabetic complication (CMS/HCC)   •  "Epigastric pain   • Diabetic peripheral neuropathy (CMS/HCC)           Plan      Keep A1c less than 7 Primary to monitor  Keep LDL below 70 mg/dl. Monitor liver and renal functions.   Monitor CBC, CMP, TSH (as indicated) and Lipid Panel by primary      S/L NTG PRN for chest pain, call me or go to ER if has to use S/L nitroglycerin     No additional cardiac testing required at this point in time.      xxxxxxxxxxxxxxxxxxxxxxxxxxxxxxxxxxxxxxxxxxxxxxxxxxxxxxxxxxxxxxxxxxxxxxxxxxxxxxxxxxxxxxxxxxxxxxxxxxxxxxxxxxxxxxxxxxxxxxxxxxxxxxxxxxxxxxxxxxxxxxxxxxxxxxxxxxxxxxxxxxxxxxxxxxxxxxxxxxxxxxxxxxxxxxxxxxxxxxxxxxxxxxxxxxxxxxxxxxxxxxxxxxxxxxxxxxxxxxxxxxxxxxxx  Health maintenance    Low salt/ HTN/ Heart healthy carbohydrate restricted cardiac diet as applicable to this patient's current diagnoses.   This handout has relevant information regarding shopping for food, preparing meals, what to eat at restaurants, tracking of food intake, information regarding sodium intake and salt content, how to read food labels, knowing what to eat, tips reagarding physical activity, calorie count and calorie expenditure. What foods to avoid. Information regarding alcoholic drinks along with \"good\" and \"bad\" fats.  Reiterated prior recommendations     The patient is advised to reduce or avoid caffeine or other cardiac stimulants.     The patient was advised that NSAID-type medications have three  very important potential side effects: cardiovascular complications, gastrointestinal irritation including hemorrhage and renal injuries.  Do not use anti-inflammatories such as Motrin/ibuprofen, Alleve/naprosyn, Mobic and like medications Use Tylenol instead.The patient expresses understanding of these issues and questions were answered.   Monitor for any signs of bleeding including red or dark stools. Fall precautions.       Monitor for any signs of bleeding including red or dark stools. Fall precautions.   Patient is asked to monitor BP at home or work, " several times per month and return with written values at next office visit.     Advised staying uptodate with immunizations per established standard guidelines.      Offered to give patient  a copy of my notes and relevant tests/ prior ECG etc for the patient to review and follow specific advise and relevant findings if any, prognosis, along with my current and future plans.      Questions were encouraged, asked and answered to the patient's  understanding and satisfaction. Questions if any regarding current medications and side effects, need for refills and importance of compliance to medications stressed.    Reviewed available prior notes, consults, prior visits, laboratory findings, radiology and cardiology relevant reports. Updated chart as applicable. I have reviewed the patient's medical history in detail and updated the computerized patient record as relevant.      Updated patient regarding any new or relevant abnormalities on review of records or any new findings on physical exam. Mentioned to patient about purpose of visit and desirable health short and long term goals and objectives.        xxxxxxxxxxxxxxxxxxxxxxxxxxxxxxxxxxxxxxxxxxxxxxxxxxxxxxxxxxxxxxxxxxxxxxxxxxxxxxxxxxxxxxxxxxxxxxxxxxxxxxxxxxxxxxxxxxxxxxxxxxxxxxxxxxxxxxxxxxxxxxxxxxxxxxxxxxxxxxxxxxxxxxxxxxxxxxxxxxxxxxxxxxxxxxxxxxxxxxxxxxxxxxxxxxxxxxxxxxxxxxxxxxxxxxxxxxxxxxxxxxxxxxxx             Return in about 6 months (around 6/7/2019).

## 2019-01-03 ENCOUNTER — APPOINTMENT (OUTPATIENT)
Dept: CT IMAGING | Facility: HOSPITAL | Age: 59
End: 2019-01-03

## 2019-01-03 ENCOUNTER — HOSPITAL ENCOUNTER (EMERGENCY)
Facility: HOSPITAL | Age: 59
Discharge: HOME | End: 2019-01-03
Attending: EMERGENCY MEDICINE | Admitting: EMERGENCY MEDICINE

## 2019-01-03 VITALS
RESPIRATION RATE: 18 BRPM | BODY MASS INDEX: 34 KG/M2 | OXYGEN SATURATION: 96 % | SYSTOLIC BLOOD PRESSURE: 124 MMHG | WEIGHT: 251 LBS | DIASTOLIC BLOOD PRESSURE: 67 MMHG | HEART RATE: 86 BPM | TEMPERATURE: 97.6 F | HEIGHT: 72 IN

## 2019-01-03 DIAGNOSIS — R10.9 LEFT FLANK PAIN: Primary | ICD-10-CM

## 2019-01-03 DIAGNOSIS — N17.9 AKI (ACUTE KIDNEY INJURY) (HCC): ICD-10-CM

## 2019-01-03 DIAGNOSIS — N20.0 RENAL STONE: ICD-10-CM

## 2019-01-03 LAB
ALBUMIN SERPL-MCNC: 4.5 G/DL (ref 3.5–5)
ALBUMIN/GLOB SERPL: 1.3 G/DL (ref 1.1–2.5)
ALP SERPL-CCNC: 114 U/L (ref 24–120)
ALT SERPL W P-5'-P-CCNC: 49 U/L (ref 0–54)
ANION GAP SERPL CALCULATED.3IONS-SCNC: 12 MMOL/L (ref 4–13)
AST SERPL-CCNC: 38 U/L (ref 7–45)
BASOPHILS # BLD AUTO: 0.04 10*3/MM3 (ref 0–0.2)
BASOPHILS NFR BLD AUTO: 0.6 % (ref 0–2)
BILIRUB SERPL-MCNC: 0.6 MG/DL (ref 0.1–1)
BILIRUB UR QL STRIP: NEGATIVE
BUN BLD-MCNC: 23 MG/DL (ref 5–21)
BUN/CREAT SERPL: 15.8 (ref 7–25)
CALCIUM SPEC-SCNC: 10.1 MG/DL (ref 8.4–10.4)
CHLORIDE SERPL-SCNC: 100 MMOL/L (ref 98–110)
CLARITY UR: ABNORMAL
CO2 SERPL-SCNC: 25 MMOL/L (ref 24–31)
COLOR UR: YELLOW
CREAT BLD-MCNC: 1.46 MG/DL (ref 0.5–1.4)
DEPRECATED RDW RBC AUTO: 39.8 FL (ref 40–54)
EOSINOPHIL # BLD AUTO: 0.28 10*3/MM3 (ref 0–0.7)
EOSINOPHIL NFR BLD AUTO: 4.1 % (ref 0–4)
ERYTHROCYTE [DISTWIDTH] IN BLOOD BY AUTOMATED COUNT: 12.7 % (ref 12–15)
GFR SERPL CREATININE-BSD FRML MDRD: 49 ML/MIN/1.73
GLOBULIN UR ELPH-MCNC: 3.4 GM/DL
GLUCOSE BLD-MCNC: 159 MG/DL (ref 70–100)
GLUCOSE BLDC GLUCOMTR-MCNC: 189 MG/DL (ref 70–130)
GLUCOSE UR STRIP-MCNC: ABNORMAL MG/DL
HCT VFR BLD AUTO: 43 % (ref 40–52)
HGB BLD-MCNC: 14.7 G/DL (ref 14–18)
HGB UR QL STRIP.AUTO: NEGATIVE
IMM GRANULOCYTES # BLD AUTO: 0.02 10*3/MM3 (ref 0–0.03)
IMM GRANULOCYTES NFR BLD AUTO: 0.3 % (ref 0–5)
KETONES UR QL STRIP: ABNORMAL
LEUKOCYTE ESTERASE UR QL STRIP.AUTO: NEGATIVE
LIPASE SERPL-CCNC: 81 U/L (ref 23–203)
LYMPHOCYTES # BLD AUTO: 2.27 10*3/MM3 (ref 0.72–4.86)
LYMPHOCYTES NFR BLD AUTO: 33.6 % (ref 15–45)
MCH RBC QN AUTO: 29.5 PG (ref 28–32)
MCHC RBC AUTO-ENTMCNC: 34.2 G/DL (ref 33–36)
MCV RBC AUTO: 86.3 FL (ref 82–95)
MONOCYTES # BLD AUTO: 0.59 10*3/MM3 (ref 0.19–1.3)
MONOCYTES NFR BLD AUTO: 8.7 % (ref 4–12)
NEUTROPHILS # BLD AUTO: 3.56 10*3/MM3 (ref 1.87–8.4)
NEUTROPHILS NFR BLD AUTO: 52.7 % (ref 39–78)
NITRITE UR QL STRIP: NEGATIVE
NRBC BLD AUTO-RTO: 0 /100 WBC (ref 0–0)
PH UR STRIP.AUTO: 5.5 [PH] (ref 5–8)
PLATELET # BLD AUTO: 160 10*3/MM3 (ref 130–400)
PMV BLD AUTO: 10.8 FL (ref 6–12)
POTASSIUM BLD-SCNC: 4 MMOL/L (ref 3.5–5.3)
PROT SERPL-MCNC: 7.9 G/DL (ref 6.3–8.7)
PROT UR QL STRIP: NEGATIVE
RBC # BLD AUTO: 4.98 10*6/MM3 (ref 4.8–5.9)
SODIUM BLD-SCNC: 137 MMOL/L (ref 135–145)
SP GR UR STRIP: >=1.03 (ref 1–1.03)
UROBILINOGEN UR QL STRIP: ABNORMAL
WBC NRBC COR # BLD: 6.76 10*3/MM3 (ref 4.8–10.8)

## 2019-01-03 PROCEDURE — 80053 COMPREHEN METABOLIC PANEL: CPT | Performed by: EMERGENCY MEDICINE

## 2019-01-03 PROCEDURE — 83690 ASSAY OF LIPASE: CPT | Performed by: EMERGENCY MEDICINE

## 2019-01-03 PROCEDURE — 85025 COMPLETE CBC W/AUTO DIFF WBC: CPT | Performed by: EMERGENCY MEDICINE

## 2019-01-03 PROCEDURE — 25010000002 MORPHINE PER 10 MG: Performed by: EMERGENCY MEDICINE

## 2019-01-03 PROCEDURE — 81003 URINALYSIS AUTO W/O SCOPE: CPT | Performed by: EMERGENCY MEDICINE

## 2019-01-03 PROCEDURE — 96375 TX/PRO/DX INJ NEW DRUG ADDON: CPT

## 2019-01-03 PROCEDURE — 74176 CT ABD & PELVIS W/O CONTRAST: CPT

## 2019-01-03 PROCEDURE — 25010000002 ONDANSETRON PER 1 MG: Performed by: EMERGENCY MEDICINE

## 2019-01-03 PROCEDURE — 96374 THER/PROPH/DIAG INJ IV PUSH: CPT

## 2019-01-03 PROCEDURE — 99283 EMERGENCY DEPT VISIT LOW MDM: CPT

## 2019-01-03 PROCEDURE — 82962 GLUCOSE BLOOD TEST: CPT

## 2019-01-03 RX ORDER — ONDANSETRON 2 MG/ML
4 INJECTION INTRAMUSCULAR; INTRAVENOUS ONCE
Status: COMPLETED | OUTPATIENT
Start: 2019-01-03 | End: 2019-01-03

## 2019-01-03 RX ORDER — SODIUM CHLORIDE 0.9 % (FLUSH) 0.9 %
10 SYRINGE (ML) INJECTION AS NEEDED
Status: DISCONTINUED | OUTPATIENT
Start: 2019-01-03 | End: 2019-01-03 | Stop reason: HOSPADM

## 2019-01-03 RX ADMIN — MORPHINE SULFATE 4 MG: 4 INJECTION, SOLUTION INTRAMUSCULAR; INTRAVENOUS at 19:46

## 2019-01-03 RX ADMIN — SODIUM CHLORIDE 500 ML: 9 INJECTION, SOLUTION INTRAVENOUS at 19:45

## 2019-01-03 RX ADMIN — ONDANSETRON 4 MG: 2 INJECTION INTRAMUSCULAR; INTRAVENOUS at 19:46

## 2019-01-04 NOTE — ED PROVIDER NOTES
Subjective   Patient is a 58-year-old male who presents to the ER with left flank pain.  Patient states he has had achy left flank pain since last night with no radiation.  Patient states he has had several kidney stones in the past and feels identical to his previous kidney stones.  Patient required surgical removal of one stone.  Patient has also had associated nausea and dysuria.  He denies any known trauma.  Patient states his pain is worsening.  He denies any fever, chest pain, shortness of breath, abdominal pain, vomiting, diarrhea, hematuria, neurological changes.            Review of Systems   Constitutional: Negative.    HENT: Negative.    Eyes: Negative.    Respiratory: Negative.    Cardiovascular: Negative.    Gastrointestinal: Positive for nausea.   Endocrine: Negative.    Genitourinary: Positive for dysuria and flank pain.   Skin: Negative.    Allergic/Immunologic: Negative.    Neurological: Negative.    Hematological: Negative.    Psychiatric/Behavioral: Negative.    All other systems reviewed and are negative.      Past Medical History:   Diagnosis Date   • Arthritis    • Asthma    • Carotid disease, bilateral (CMS/HCC)    • Chest pain    • Chronic ischemic heart disease    • Chronic sinusitis    • Maribell bullosa    • Coronary artery disease    • Deviated septum    • Diabetes mellitus (CMS/HCC)    • Difficulty urinating    • Diverticulitis    • Enlarged prostate    • Fatty liver    • GERD (gastroesophageal reflux disease)    • Hyperlipidemia    • Hypertension    • Hypertrophy of nasal turbinates    • Keratoderma    • Kidney stone    • Migraine    • Murmur, heart    • Myocardial infarction (CMS/HCC)    • Obesity    • PONV (postoperative nausea and vomiting)    • Psoriasis    • Seizures (CMS/HCC)    • Sinus congestion    • Sleep apnea    • SOB (shortness of breath)    • Stroke (CMS/HCC)    • UTI (urinary tract infection)        Allergies   Allergen Reactions   • Flagyl [Metronidazole] Hives   •  Atorvastatin Other (See Comments)     Muscle cramps   • Ciprofloxacin Hives       Past Surgical History:   Procedure Laterality Date   • CARDIAC CATHETERIZATION  01/2016    Dr. Broadbent; widely patent previously placed stents in the left anterior descending and obstructive disease involving the diagonal branch which was treated medically   • CARDIAC CATHETERIZATION N/A 7/14/2017    Procedure: Left Heart Cath;  Surgeon: Wade Ramey MD;  Location: [facility] CATH INVASIVE LOCATION;  Service:    • CARDIAC CATHETERIZATION Left 10/15/2018    Procedure: Cardiac Catheterization/Vascular Study;  Surgeon: Wade Ramey MD;  Location:  [facility] CATH INVASIVE LOCATION;  Service: Cardiology   • CARDIAC CATHETERIZATION  10/15/2018    Procedure: Functional Flow Thornton;  Surgeon: Wade Ramey MD;  Location: [facility] CATH INVASIVE LOCATION;  Service: Cardiology   • CARDIAC CATHETERIZATION N/A 10/15/2018    Procedure: Left ventriculography;  Surgeon: Wade Ramey MD;  Location: [facility] CATH INVASIVE LOCATION;  Service: Cardiology   • CHOLECYSTECTOMY WITH INTRAOPERATIVE CHOLANGIOGRAM N/A 8/1/2018    Procedure: CHOLECYSTECTOMY LAPAROSCOPIC INTRAOPERATIVE CHOLANGIOGRAM;  Surgeon: Shane Ann MD;  Location: [facility] OR;  Service: General   • CORONARY ANGIOPLASTY     • CORONARY STENT PLACEMENT      x 6   • ENDOSCOPIC FUNCTIONAL SINUS SURGERY (FESS) Bilateral 12/13/2017    Procedure: PROCEDURE PERFORMED:  Bilateral functional endoscopic anterior ethmoidectomy with bilateral middle meatal antrostomy Septoplasty Right kathia bullosa resection Bilateral inferior turbinate reduction via Coblation;  Surgeon: Mayank Ibarra MD;  Location: [facility] OR;  Service:    • ENDOSCOPY N/A 7/30/2018    Procedure: ESOPHAGOGASTRODUODENOSCOPY WITH ANESTHESIA;  Surgeon: Benitez Mas MD;  Location: [facility] ENDOSCOPY;  Service: Gastroenterology   • HERNIA REPAIR      x2 inguinal area   • KIDNEY STONE SURGERY     • KNEE SURGERY Right    • OTHER SURGICAL  HISTORY      urolift   • PROSTATE SURGERY      Dr. Badillo - 2017   • THUMB AMPUTATION Left     partial   • TOE AMPUTATION Right     big       Family History   Problem Relation Age of Onset   • Heart disease Father    • No Known Problems Mother        Social History     Socioeconomic History   • Marital status:      Spouse name: Not on file   • Number of children: Not on file   • Years of education: Not on file   • Highest education level: Not on file   Tobacco Use   • Smoking status: Former Smoker     Years: 4.50     Types: Cigarettes     Last attempt to quit:      Years since quittin.0   • Smokeless tobacco: Former User     Types: Chew     Quit date:    Substance and Sexual Activity   • Alcohol use: No     Comment: quit    • Drug use: No   • Sexual activity: Defer           Objective   Physical Exam   Constitutional: He is oriented to person, place, and time. He appears well-developed and well-nourished.   HENT:   Head: Normocephalic and atraumatic.   Eyes: Conjunctivae are normal. Pupils are equal, round, and reactive to light.   Neck: Normal range of motion.   Cardiovascular: Normal rate, regular rhythm and normal heart sounds.   Pulmonary/Chest: Effort normal and breath sounds normal.   Abdominal: Soft. There is no tenderness. There is CVA tenderness. There is no rigidity, no rebound and no guarding.   Musculoskeletal: Normal range of motion. He exhibits no edema or deformity.   Neurological: He is alert and oriented to person, place, and time. He has normal strength.   Skin: Skin is warm.   Psychiatric: He has a normal mood and affect. His behavior is normal.   Nursing note and vitals reviewed.      Procedures           ED Course      He was given IV fluids, morphine and Zofran.  Pain resolved with treatment.      Lab Results (last 24 hours)     Procedure Component Value Units Date/Time    POC Glucose Once [[id number]]  (Abnormal) Collected:  19    Specimen:  Blood Updated:   01/03/19 1824     Glucose 189 mg/dL      Comment: : [id number] Kassi Araujo LMeter ID: [id number]       Urinalysis With Culture If Indicated - Urine, Clean Catch [[id number]]  (Abnormal) Collected:  01/03/19 1857    Specimen:  Urine, Clean Catch Updated:  01/03/19 1906     Color, UA Yellow     Appearance, UA Cloudy     pH, UA 5.5     Specific Gravity, UA >=1.030     Glucose, UA >=1000 mg/dL (3+)     Ketones, UA Trace     Bilirubin, UA Negative     Blood, UA Negative     Protein, UA Negative     Leuk Esterase, UA Negative     Nitrite, UA Negative     Urobilinogen, UA 1.0 E.U./dL    Narrative:       Urine microscopic not indicated.    CBC & Differential [[id number]] Collected:  01/03/19 1944    Specimen:  Blood Updated:  01/03/19 1956    Narrative:       The following orders were created for panel order CBC & Differential.  Procedure                               Abnormality         Status                     ---------                               -----------         ------                     CBC Auto Differential[[id number]]        Abnormal            Final result                 Please view results for these tests on the individual orders.    Comprehensive Metabolic Panel [[id number]]  (Abnormal) Collected:  01/03/19 1944    Specimen:  Blood Updated:  01/03/19 2008     Glucose 159 mg/dL      BUN 23 mg/dL      Creatinine 1.46 mg/dL      Sodium 137 mmol/L      Potassium 4.0 mmol/L      Chloride 100 mmol/L      CO2 25.0 mmol/L      Calcium 10.1 mg/dL      Total Protein 7.9 g/dL      Albumin 4.50 g/dL      ALT (SGPT) 49 U/L      AST (SGOT) 38 U/L      Alkaline Phosphatase 114 U/L      Total Bilirubin 0.6 mg/dL      eGFR Non African Amer 49 mL/min/1.73      Globulin 3.4 gm/dL      A/G Ratio 1.3 g/dL      BUN/Creatinine Ratio 15.8     Anion Gap 12.0 mmol/L     Lipase [[id number]]  (Normal) Collected:  01/03/19 1944    Specimen:  Blood Updated:  01/03/19 2008     Lipase 81 U/L     CBC Auto Differential [[id number]]   (Abnormal) Collected:  01/03/19 1944    Specimen:  Blood Updated:  01/03/19 1956     WBC 6.76 10*3/mm3      RBC 4.98 10*6/mm3      Hemoglobin 14.7 g/dL      Hematocrit 43.0 %      MCV 86.3 fL      MCH 29.5 pg      MCHC 34.2 g/dL      RDW 12.7 %      RDW-SD 39.8 fl      MPV 10.8 fL      Platelets 160 10*3/mm3      Neutrophil % 52.7 %      Lymphocyte % 33.6 %      Monocyte % 8.7 %      Eosinophil % 4.1 %      Basophil % 0.6 %      Immature Grans % 0.3 %      Neutrophils, Absolute 3.56 10*3/mm3      Lymphocytes, Absolute 2.27 10*3/mm3      Monocytes, Absolute 0.59 10*3/mm3      Eosinophils, Absolute 0.28 10*3/mm3      Basophils, Absolute 0.04 10*3/mm3      Immature Grans, Absolute 0.02 10*3/mm3      nRBC 0.0 /100 WBC         CT Abdomen Pelvis Stone Protocol   Final Result   1. Punctate nonobstructive left renal stone measuring 1-2 mm in size.   Otherwise, no acute findings in the abdomen or pelvis.   2. Atherosclerotic disease.               This report was finalized on 01/03/2019 20:25 by Dr. Ramin Balderas MD.        Labs showed a mildly elevated BUN and creatinine and mild hyperglycemia.  CT scan showed a punctate nonobstructive left renal stone measuring 1-2 mm in size.  Otherwise was unremarkable.  No cause for pain found.  Pain had resolved while here in the ER.  Patient denies any trauma.  Patient could have recently passed a kidney stone.  Patient will be discharged home to follow up with his PCP in the next few days for a BUN and creatinine recheck.  Return to the ER for any worsening or new pain, fever, vomiting or other concerns.          MDM      Final diagnoses:   Left flank pain   Renal stone   JOSE (acute kidney injury) (CMS/[facility])            Mari Llamas MD  01/03/19 0844

## 2019-01-11 NOTE — ED NOTES
"ED Call Back Questions    1. How are you doing since leaving the Emergency Department?  Doing better, I passed that stone     2. Do you have any questions about your discharge instructions? No     3. Have you filled your new prescriptions yet? N/A  a. Do you have any questions about those medications? N/A    4. Were you able to make a follow-up appointment with the physician? Yes     5. Do you have a primary care physician? Yes   a. If No, would you like for me to set you up with one? N/A  i. If Yes, “I will have our ED  give you a call right back at this number to work with you on the best time for an appointment.”    6. We are always looking to get better at what we do. Do you have any suggestions for what we can do to be even better? N/A  a. If Yes, \"Thank you for sharing your concerns. I apologize. I will follow up with our manager and patient . Would you like someone to call you back?\" N/A    7. Is there anything else I can do for you? N/A visit was Raffy Kearney  01/11/19 1004    "

## 2019-02-14 ENCOUNTER — APPOINTMENT (OUTPATIENT)
Dept: CT IMAGING | Facility: HOSPITAL | Age: 59
End: 2019-02-14

## 2019-02-14 ENCOUNTER — HOSPITAL ENCOUNTER (EMERGENCY)
Facility: HOSPITAL | Age: 59
Discharge: HOME | End: 2019-02-14
Attending: EMERGENCY MEDICINE | Admitting: EMERGENCY MEDICINE

## 2019-02-14 ENCOUNTER — APPOINTMENT (OUTPATIENT)
Dept: GENERAL RADIOLOGY | Facility: HOSPITAL | Age: 59
End: 2019-02-14

## 2019-02-14 VITALS
TEMPERATURE: 100.7 F | BODY MASS INDEX: 34.54 KG/M2 | OXYGEN SATURATION: 95 % | WEIGHT: 255 LBS | HEART RATE: 105 BPM | SYSTOLIC BLOOD PRESSURE: 125 MMHG | HEIGHT: 72 IN | DIASTOLIC BLOOD PRESSURE: 85 MMHG | RESPIRATION RATE: 18 BRPM

## 2019-02-14 DIAGNOSIS — R56.9 SEIZURE (HCC): ICD-10-CM

## 2019-02-14 DIAGNOSIS — R50.9 FEVER AND CHILLS: Primary | ICD-10-CM

## 2019-02-14 LAB
ALBUMIN SERPL-MCNC: 5 G/DL (ref 3.5–5)
ALBUMIN/GLOB SERPL: 1.8 G/DL (ref 1.1–2.5)
ALP SERPL-CCNC: 93 U/L (ref 24–120)
ALT SERPL W P-5'-P-CCNC: 45 U/L (ref 0–54)
ANION GAP SERPL CALCULATED.3IONS-SCNC: 11 MMOL/L (ref 4–13)
ARTERIAL PATENCY WRIST A: POSITIVE
AST SERPL-CCNC: 42 U/L (ref 7–45)
ATMOSPHERIC PRESS: 744 MMHG
BASE EXCESS BLDA CALC-SCNC: -0.8 MMOL/L (ref 0–2)
BASOPHILS # BLD AUTO: 0.04 10*3/MM3 (ref 0–0.2)
BASOPHILS NFR BLD AUTO: 0.4 % (ref 0–2)
BDY SITE: ABNORMAL
BILIRUB SERPL-MCNC: 1 MG/DL (ref 0.1–1)
BILIRUB UR QL STRIP: NEGATIVE
BODY TEMPERATURE: 37 C
BUN BLD-MCNC: 16 MG/DL (ref 5–21)
BUN/CREAT SERPL: 16 (ref 7–25)
CALCIUM SPEC-SCNC: 9.4 MG/DL (ref 8.4–10.4)
CHLORIDE SERPL-SCNC: 99 MMOL/L (ref 98–110)
CLARITY UR: CLEAR
CO2 SERPL-SCNC: 26 MMOL/L (ref 24–31)
COLOR UR: YELLOW
CREAT BLD-MCNC: 1 MG/DL (ref 0.5–1.4)
D-LACTATE SERPL-SCNC: 2.1 MMOL/L (ref 0.5–2)
DEPRECATED RDW RBC AUTO: 39.8 FL (ref 40–54)
EOSINOPHIL # BLD AUTO: 0.12 10*3/MM3 (ref 0–0.7)
EOSINOPHIL NFR BLD AUTO: 1.2 % (ref 0–4)
ERYTHROCYTE [DISTWIDTH] IN BLOOD BY AUTOMATED COUNT: 12.7 % (ref 12–15)
FLUAV AG NPH QL: NEGATIVE
FLUBV AG NPH QL IA: NEGATIVE
GFR SERPL CREATININE-BSD FRML MDRD: 76 ML/MIN/1.73
GLOBULIN UR ELPH-MCNC: 2.8 GM/DL
GLUCOSE BLD-MCNC: 155 MG/DL (ref 70–100)
GLUCOSE UR STRIP-MCNC: ABNORMAL MG/DL
HCO3 BLDA-SCNC: 22.1 MMOL/L (ref 20–26)
HCT VFR BLD AUTO: 43.8 % (ref 40–52)
HGB BLD-MCNC: 15.1 G/DL (ref 14–18)
HGB UR QL STRIP.AUTO: NEGATIVE
HOLD SPECIMEN: NORMAL
HOROWITZ INDEX BLD+IHG-RTO: 21 %
INR PPP: 0.92 (ref 0.91–1.09)
KETONES UR QL STRIP: NEGATIVE
LEUKOCYTE ESTERASE UR QL STRIP.AUTO: NEGATIVE
LYMPHOCYTES # BLD AUTO: 1.2 10*3/MM3 (ref 0.72–4.86)
LYMPHOCYTES NFR BLD AUTO: 12.1 % (ref 15–45)
Lab: ABNORMAL
MCH RBC QN AUTO: 30 PG (ref 28–32)
MCHC RBC AUTO-ENTMCNC: 34.5 G/DL (ref 33–36)
MCV RBC AUTO: 87.1 FL (ref 82–95)
MODALITY: ABNORMAL
MONOCYTES # BLD AUTO: 0.81 10*3/MM3 (ref 0.19–1.3)
MONOCYTES NFR BLD AUTO: 8.2 % (ref 4–12)
NEUTROPHILS # BLD AUTO: 7.69 10*3/MM3 (ref 1.87–8.4)
NEUTROPHILS NFR BLD AUTO: 77.6 % (ref 39–78)
NITRITE UR QL STRIP: NEGATIVE
PCO2 BLDA: 30.7 MM HG (ref 35–45)
PH BLDA: 7.47 PH UNITS (ref 7.35–7.45)
PH UR STRIP.AUTO: <=5 [PH] (ref 5–8)
PLATELET # BLD AUTO: 104 10*3/MM3 (ref 130–400)
PMV BLD AUTO: 10.9 FL (ref 6–12)
PO2 BLDA: 98.5 MM HG (ref 83–108)
POTASSIUM BLD-SCNC: 4.3 MMOL/L (ref 3.5–5.3)
PROT SERPL-MCNC: 7.8 G/DL (ref 6.3–8.7)
PROT UR QL STRIP: NEGATIVE
PROTHROMBIN TIME: 12.6 SECONDS (ref 11.9–14.6)
RBC # BLD AUTO: 5.03 10*6/MM3 (ref 4.8–5.9)
SAO2 % BLDCOA: 98.3 % (ref 94–99)
SODIUM BLD-SCNC: 136 MMOL/L (ref 135–145)
SP GR UR STRIP: 1.02 (ref 1–1.03)
TROPONIN I SERPL-MCNC: <0.012 NG/ML (ref 0–0.03)
UROBILINOGEN UR QL STRIP: ABNORMAL
VENTILATOR MODE: ABNORMAL
WBC NRBC COR # BLD: 9.91 10*3/MM3 (ref 4.8–10.8)

## 2019-02-14 PROCEDURE — 96374 THER/PROPH/DIAG INJ IV PUSH: CPT

## 2019-02-14 PROCEDURE — 84484 ASSAY OF TROPONIN QUANT: CPT | Performed by: EMERGENCY MEDICINE

## 2019-02-14 PROCEDURE — 70450 CT HEAD/BRAIN W/O DYE: CPT

## 2019-02-14 PROCEDURE — 71045 X-RAY EXAM CHEST 1 VIEW: CPT

## 2019-02-14 PROCEDURE — 87185 SC STD ENZYME DETCJ PER NZM: CPT | Performed by: EMERGENCY MEDICINE

## 2019-02-14 PROCEDURE — 87040 BLOOD CULTURE FOR BACTERIA: CPT | Performed by: EMERGENCY MEDICINE

## 2019-02-14 PROCEDURE — 36600 WITHDRAWAL OF ARTERIAL BLOOD: CPT

## 2019-02-14 PROCEDURE — 83605 ASSAY OF LACTIC ACID: CPT | Performed by: EMERGENCY MEDICINE

## 2019-02-14 PROCEDURE — 87804 INFLUENZA ASSAY W/OPTIC: CPT | Performed by: EMERGENCY MEDICINE

## 2019-02-14 PROCEDURE — 87147 CULTURE TYPE IMMUNOLOGIC: CPT | Performed by: EMERGENCY MEDICINE

## 2019-02-14 PROCEDURE — 85610 PROTHROMBIN TIME: CPT | Performed by: EMERGENCY MEDICINE

## 2019-02-14 PROCEDURE — 25010000002 ONDANSETRON PER 1 MG: Performed by: EMERGENCY MEDICINE

## 2019-02-14 PROCEDURE — 96361 HYDRATE IV INFUSION ADD-ON: CPT

## 2019-02-14 PROCEDURE — 85025 COMPLETE CBC W/AUTO DIFF WBC: CPT | Performed by: EMERGENCY MEDICINE

## 2019-02-14 PROCEDURE — 80053 COMPREHEN METABOLIC PANEL: CPT | Performed by: EMERGENCY MEDICINE

## 2019-02-14 PROCEDURE — 81003 URINALYSIS AUTO W/O SCOPE: CPT | Performed by: EMERGENCY MEDICINE

## 2019-02-14 PROCEDURE — 82803 BLOOD GASES ANY COMBINATION: CPT

## 2019-02-14 PROCEDURE — 87186 SC STD MICRODIL/AGAR DIL: CPT | Performed by: EMERGENCY MEDICINE

## 2019-02-14 PROCEDURE — 87150 DNA/RNA AMPLIFIED PROBE: CPT | Performed by: EMERGENCY MEDICINE

## 2019-02-14 PROCEDURE — 99284 EMERGENCY DEPT VISIT MOD MDM: CPT

## 2019-02-14 PROCEDURE — 87077 CULTURE AEROBIC IDENTIFY: CPT | Performed by: EMERGENCY MEDICINE

## 2019-02-14 RX ORDER — AZITHROMYCIN 250 MG/1
500 TABLET, FILM COATED ORAL ONCE
Status: COMPLETED | OUTPATIENT
Start: 2019-02-14 | End: 2019-02-14

## 2019-02-14 RX ORDER — AZITHROMYCIN 250 MG/1
250 TABLET, FILM COATED ORAL DAILY
Qty: 4 TABLET | Refills: 0 | Status: SHIPPED | OUTPATIENT
Start: 2019-02-15 | End: 2019-03-08

## 2019-02-14 RX ORDER — ONDANSETRON 2 MG/ML
4 INJECTION INTRAMUSCULAR; INTRAVENOUS ONCE
Status: COMPLETED | OUTPATIENT
Start: 2019-02-14 | End: 2019-02-14

## 2019-02-14 RX ORDER — ACETAMINOPHEN 500 MG
1000 TABLET ORAL ONCE
Status: COMPLETED | OUTPATIENT
Start: 2019-02-14 | End: 2019-02-14

## 2019-02-14 RX ADMIN — ONDANSETRON HYDROCHLORIDE 4 MG: 2 INJECTION INTRAMUSCULAR; INTRAVENOUS at 21:16

## 2019-02-14 RX ADMIN — ACETAMINOPHEN 1000 MG: 500 TABLET, FILM COATED ORAL at 21:16

## 2019-02-14 RX ADMIN — SODIUM CHLORIDE 2328 ML: 9 INJECTION, SOLUTION INTRAVENOUS at 20:34

## 2019-02-14 RX ADMIN — AZITHROMYCIN 500 MG: 250 TABLET, FILM COATED ORAL at 23:22

## 2019-02-15 ENCOUNTER — TELEPHONE (OUTPATIENT)
Dept: NEUROLOGY | Facility: CLINIC | Age: 59
End: 2019-02-15

## 2019-02-15 DIAGNOSIS — G40.909 SEIZURE DISORDER (HCC): Primary | ICD-10-CM

## 2019-02-15 LAB — BACTERIA BLD CULT: NORMAL

## 2019-02-15 NOTE — TELEPHONE ENCOUNTER
Daniela said Carlos A had seizures yesterday, he went to ER and continues to have seizures in ER and at home after ER.  His last seizures was around 3:30 am.  He hasn't missed any doses of medication, nothing out of the ordinary is going on.  He said he didn't feel good and had a seizure.  He was given a Z-Humphrey because he had an elevated temp, but his temp increased after he has a seizure.

## 2019-02-15 NOTE — TELEPHONE ENCOUNTER
Please let patient know I would like to get EEG. Can we get compliance download as well for CPAP. Please move appointment to March 8 in the morning.  sejal

## 2019-02-15 NOTE — ED PROVIDER NOTES
Subjective   58-year-old male presenting to the emergency department with seizures.  Patient has a history of epilepsy but states he has had 11 episodes today.  Patient seizures typically occur with general tonic-clonic pain behaviors however with out loss of consciousness.  These are documented by neurology been found previously.  Patient is on Keppra as well as rectal and states he's been taking his medications appropriately.  Patient denies any infectious-like etiologies however he did have a fever on arrival today.            Review of Systems   Constitutional: Negative.    HENT: Negative.    Eyes: Negative.    Respiratory: Negative.    Cardiovascular: Negative.    Gastrointestinal: Negative.    Endocrine: Negative.    Genitourinary: Negative.    Musculoskeletal: Negative.    Skin: Negative.    Allergic/Immunologic: Negative.    Neurological: Positive for seizures.   Hematological: Negative.    Psychiatric/Behavioral: Negative.        Past Medical History:   Diagnosis Date   • Arthritis    • Asthma    • Carotid disease, bilateral (CMS/HCC)    • Chest pain    • Chronic ischemic heart disease    • Chronic sinusitis    • Maribell bullosa    • Coronary artery disease    • Deviated septum    • Diabetes mellitus (CMS/HCC)    • Difficulty urinating    • Diverticulitis    • Enlarged prostate    • Fatty liver    • GERD (gastroesophageal reflux disease)    • Hyperlipidemia    • Hypertension    • Hypertrophy of nasal turbinates    • Keratoderma    • Kidney stone    • Migraine    • Murmur, heart    • Myocardial infarction (CMS/HCC)    • Obesity    • PONV (postoperative nausea and vomiting)    • Psoriasis    • Seizures (CMS/HCC)    • Sinus congestion    • Sleep apnea    • SOB (shortness of breath)    • Stroke (CMS/HCC)    • UTI (urinary tract infection)        Allergies   Allergen Reactions   • Flagyl [Metronidazole] Hives   • Atorvastatin Other (See Comments)     Muscle cramps   • Ciprofloxacin Hives       Past Surgical  History:   Procedure Laterality Date   • CARDIAC CATHETERIZATION  01/2016    Dr. Broadbent; widely patent previously placed stents in the left anterior descending and obstructive disease involving the diagonal branch which was treated medically   • CARDIAC CATHETERIZATION N/A 7/14/2017    Procedure: Left Heart Cath;  Surgeon: Wade Ramey MD;  Location: [facility] CATH INVASIVE LOCATION;  Service:    • CARDIAC CATHETERIZATION Left 10/15/2018    Procedure: Cardiac Catheterization/Vascular Study;  Surgeon: Wade Ramey MD;  Location: [facility] CATH INVASIVE LOCATION;  Service: Cardiology   • CARDIAC CATHETERIZATION  10/15/2018    Procedure: Functional Flow [place];  Surgeon: Wade Ramey MD;  Location: [facility] CATH INVASIVE LOCATION;  Service: Cardiology   • CARDIAC CATHETERIZATION N/A 10/15/2018    Procedure: Left ventriculography;  Surgeon: Wade Ramey MD;  Location: [facility] CATH INVASIVE LOCATION;  Service: Cardiology   • CHOLECYSTECTOMY WITH INTRAOPERATIVE CHOLANGIOGRAM N/A 8/1/2018    Procedure: CHOLECYSTECTOMY LAPAROSCOPIC INTRAOPERATIVE CHOLANGIOGRAM;  Surgeon: Shane Ann MD;  Location: [facility] OR;  Service: General   • CORONARY ANGIOPLASTY     • CORONARY STENT PLACEMENT      x 6   • ENDOSCOPIC FUNCTIONAL SINUS SURGERY (FESS) Bilateral 12/13/2017    Procedure: PROCEDURE PERFORMED:  Bilateral functional endoscopic anterior ethmoidectomy with bilateral middle meatal antrostomy Septoplasty Right kathia bullosa resection Bilateral inferior turbinate reduction via Coblation;  Surgeon: Mayank Ibarra MD;  Location: [facility] OR;  Service:    • ENDOSCOPY N/A 7/30/2018    Procedure: ESOPHAGOGASTRODUODENOSCOPY WITH ANESTHESIA;  Surgeon: Benitez Mas MD;  Location: [facility] ENDOSCOPY;  Service: Gastroenterology   • HERNIA REPAIR      x2 inguinal area   • KIDNEY STONE SURGERY     • KNEE SURGERY Right    • OTHER SURGICAL HISTORY      urolift   • PROSTATE SURGERY      Dr. Badillo - 2017   • THUMB AMPUTATION Left      partial   • TOE AMPUTATION Right     big       Family History   Problem Relation Age of Onset   • Heart disease Father    • No Known Problems Mother        Social History     Socioeconomic History   • Marital status:      Spouse name: Not on file   • Number of children: Not on file   • Years of education: Not on file   • Highest education level: Not on file   Tobacco Use   • Smoking status: Former Smoker     Years: 4.50     Types: Cigarettes     Last attempt to quit:      Years since quittin.1   • Smokeless tobacco: Former User     Types: Chew     Quit date:    Substance and Sexual Activity   • Alcohol use: No     Comment: quit    • Drug use: No   • Sexual activity: Defer           Objective   Physical Exam   Constitutional: He is oriented to person, place, and time. He appears well-developed and well-nourished.   HENT:   Head: Normocephalic and atraumatic.   Nose: Nose normal.   Eyes: EOM are normal. Pupils are equal, round, and reactive to light.   Neck: Normal range of motion. Neck supple.   Cardiovascular: Normal rate, regular rhythm and normal heart sounds.   Pulmonary/Chest: Effort normal and breath sounds normal.   Abdominal: Soft. He exhibits no distension. There is no tenderness. There is no rebound and no guarding.   Musculoskeletal: Normal range of motion.   Neurological: He is alert and oriented to person, place, and time. No cranial nerve deficit or sensory deficit.   Skin: Skin is warm and dry. Capillary refill takes less than 2 seconds.   Psychiatric: He has a normal mood and affect. His behavior is normal.   Nursing note and vitals reviewed.      Procedures           ED Course  ED Course as of 2255   Thu 2019   2251 Patient doing well at this time vitals normal.  Discussed case with Dr. Kruger interviewed history, EEGs have been negative in the past.  Thus, is more likely patient is having pseudoseizures given his clinical presentation of awake and alert  seizures.  Since patient did have a lactate of 2.1 which is very mildly elevated in the setting of metformin use which can elevate lactate patient will be discharged with azithromycin prescription given first dose in the Ed.  Per my interpretation chest x-ray is clear however radiology still needs to have final read.  [AP]      ED Course User Index  [AP] Lacie Pizarro MD                  [facility]      Final diagnoses:   Fever and chills   Seizure (CMS/HCC)            Lacie Pizarro MD  02/14/19 2255       Lacie Pizarro MD  02/14/19 2252

## 2019-02-18 LAB
BACTERIA SPEC AEROBE CULT: ABNORMAL
BACTERIA SPEC AEROBE CULT: ABNORMAL
GRAM STN SPEC: ABNORMAL
GRAM STN SPEC: ABNORMAL
ISOLATED FROM: ABNORMAL
ISOLATED FROM: ABNORMAL

## 2019-02-23 ENCOUNTER — TELEPHONE (OUTPATIENT)
Dept: EMERGENCY DEPT | Facility: HOSPITAL | Age: 59
End: 2019-02-23

## 2019-03-05 ENCOUNTER — HOSPITAL ENCOUNTER (OUTPATIENT)
Dept: NEUROLOGY | Facility: HOSPITAL | Age: 59
Discharge: HOME | End: 2019-03-05
Admitting: CLINICAL NURSE SPECIALIST

## 2019-03-05 DIAGNOSIS — G40.909 SEIZURE DISORDER (HCC): ICD-10-CM

## 2019-03-05 PROCEDURE — 95819 EEG AWAKE AND ASLEEP: CPT

## 2019-03-05 PROCEDURE — 95819 EEG AWAKE AND ASLEEP: CPT | Performed by: PSYCHIATRY & NEUROLOGY

## 2019-03-08 ENCOUNTER — OFFICE VISIT (OUTPATIENT)
Dept: NEUROLOGY | Facility: CLINIC | Age: 59
End: 2019-03-08

## 2019-03-08 VITALS
WEIGHT: 246 LBS | DIASTOLIC BLOOD PRESSURE: 88 MMHG | BODY MASS INDEX: 33.32 KG/M2 | HEART RATE: 82 BPM | SYSTOLIC BLOOD PRESSURE: 128 MMHG | HEIGHT: 72 IN

## 2019-03-08 DIAGNOSIS — G47.33 OSA ON CPAP: ICD-10-CM

## 2019-03-08 DIAGNOSIS — Z99.89 OSA ON CPAP: ICD-10-CM

## 2019-03-08 DIAGNOSIS — I69.30 CHRONIC LEFT ARTERIAL ISCHEMIC STROKE, ICA (INTERNAL CAROTID ARTERY): ICD-10-CM

## 2019-03-08 DIAGNOSIS — I77.9 BILATERAL CAROTID ARTERY DISEASE, UNSPECIFIED TYPE (HCC): ICD-10-CM

## 2019-03-08 DIAGNOSIS — E78.2 MIXED HYPERLIPIDEMIA: ICD-10-CM

## 2019-03-08 DIAGNOSIS — G40.909 SEIZURE DISORDER (HCC): Primary | ICD-10-CM

## 2019-03-08 PROCEDURE — 99214 OFFICE O/P EST MOD 30 MIN: CPT | Performed by: CLINICAL NURSE SPECIALIST

## 2019-03-08 RX ORDER — LAMOTRIGINE 200 MG/1
200 TABLET ORAL 2 TIMES DAILY
Qty: 180 TABLET | Refills: 1 | Status: SHIPPED | OUTPATIENT
Start: 2019-03-08 | End: 2019-07-22 | Stop reason: SDUPTHER

## 2019-03-08 RX ORDER — LEVETIRACETAM 500 MG/1
2000 TABLET ORAL NIGHTLY
Qty: 360 TABLET | Refills: 1 | Status: SHIPPED | OUTPATIENT
Start: 2019-03-08 | End: 2019-07-22 | Stop reason: DRUGHIGH

## 2019-03-08 RX ORDER — LEVETIRACETAM 500 MG/1
1500 TABLET ORAL DAILY
Qty: 270 TABLET | Refills: 1 | Status: SHIPPED | OUTPATIENT
Start: 2019-03-08 | End: 2019-07-22 | Stop reason: DRUGHIGH

## 2019-03-08 NOTE — PROGRESS NOTES
Subjective     Chief Complaint   Patient presents with   • Seizures         Carlos A Boyer Jr. is a 58 y.o. male  ambidextrous retiree, .  He is here today for follow up for seizure and syncopal events. He is accompanied by his wife. He was last seen 11/2018.  He had been doing until until 2/14/19 when he began to have several seizures and yolis had 11 seizures in 5 hour period. He was incontinent, confused, and imbalanced after the seizure. He did come to [facility] ED and testing done including CT head, labs and blood culture. I have reviewed results. Temperature in the ED was 100.7.  Last reported seizure was 2/20/19. Started abx 2/14/19. No reported temperature since. Denies missing doses of medication. Denies vision changes or sensitivity to bright lights. Denies neck stiffness/rigidity. Wife reports patient was fine on 2/14/19, no illness and no confusion, no HA or vision changes.  Picked his wife up from work and stated was not feeling well. Back to baseline on 2/18/19. Had several days of confusion. He did see his PCP 2/18/19 and was given prescription for antibiotic. He did have EEG 3/5/19 that was read as normal. CT head 2/14/19 was unremarkable. Blood cultures showed staphylococcus schleiferi.  Patient denies missing doses of medications prior to the seizures.   Patient takes Keppra 500 mg 3 tablets in AM and 4 tablets in PM, lamictal 200 mg BID.     Wife reports in the past, patient would have elevated temperature with seizures but over the years has stopped taking temperature.     He reports compliance with CPAP. He has hx of CAD with prior heart  catheterization and per patient no intervention done because vessels too small.    Patient has complaints of  LE numbness and shooting pains in his feet and this is treated with abapentin 100 mg at HS which continues help his symptoms tremendously and wishes to continue.     Seizures    This is a chronic (2012, would occur 2-4 times a month and last up to 10  mintues) problem. Episode onset: last episode was about A2/20/19. Duration: last about 5 minutes now with medications. Associated symptoms include headaches. Pertinent negatives include no confusion, no visual disturbance, no chest pain, no nausea, no vomiting and no diarrhea. Characteristics include bladder incontinence, rhythmic jerking and loss of consciousness. Characteristics do not include bowel incontinence, bit tongue or apnea. never had LOC, would have tremoring from inside out, and would have tremor in both arms and rarely in legs. would have staring , can hear Possible causes do not include sleep deprivation. The maximum temperature recorded prior to his arrival was 101 to 101.9 F. Associated symptoms comments: fatigue.   Peripheral Neuropathy   This is a chronic problem. Episode onset: since 2016 worse since June 2018. The problem occurs every several days (4-5 times per week and mostly at night). Associated symptoms include headaches. Pertinent negatives include no arthralgias, chest pain, fatigue, fever, myalgias, nausea, vomiting or weakness. Associated symptoms comments: Shooting pains, burning sensations, numbness in toes. When barefoot feels like walking on pipo. Shooting pains can last about 1 hour and does wake from sleep. Exacerbated by: DM, dyslipidemia, obesity. He has tried nothing (A1C improved) for the symptoms.   Syncope   This is a new problem. The current episode started in the past 7 days. Episode frequency: at least 3 times since 1/3/17. The problem has been resolved. He lost consciousness for a period of 1 to 5 minutes (no more than 3 minutes). Exacerbated by: severe forceful cough. Associated symptoms include back pain, bladder incontinence and headaches. Pertinent negatives include no bowel incontinence, chest pain, confusion, dizziness, fever, nausea, vomiting or weakness. He has tried nothing for the symptoms. The treatment provided no relief. His past medical history is  significant for HTN and seizures. leslye and has not used CPAP because of nasal surgery.        Current Outpatient Medications   Medication Sig Dispense Refill   • albuterol (PROVENTIL HFA;VENTOLIN HFA) 108 (90 BASE) MCG/ACT inhaler Inhale 2 puffs Every 6 (Six) Hours As Needed for wheezing.     • aspirin 81 MG chewable tablet Chew 81 mg Daily.     • carboxymethylcellulose (REFRESH PLUS) 0.5 % solution Administer 1 drop to both eyes 4 (Four) Times a Day As Needed for Dry Eyes.     • dicyclomine (BENTYL) 20 MG tablet Take 1 tablet by mouth Every 6 (Six) Hours As Needed (abdominal pain). 8 tablet 0   • fluticasone (FLONASE) 50 MCG/ACT nasal spray 2 sprays into each nostril Daily.     • gabapentin (NEURONTIN) 100 MG capsule Take 1 capsule by mouth Every Night. 30 capsule 2   • HYDROcodone-acetaminophen (NORCO) 7.5-325 MG per tablet Take 1 tablet by mouth Every 4 (Four) Hours As Needed for Moderate Pain  for up to 40 doses. 40 tablet 0   • insulin aspart (novoLOG FLEXPEN) 100 UNIT/ML solution pen-injector sc pen Inject 30 Units under the skin into the appropriate area as directed Every Morning. 26 UNITS LUNCH 30 AT HS     • Insulin Glargine (LANTUS SOLOSTAR) 100 UNIT/ML injection pen Inject  under the skin into the appropriate area as directed 2 (Two) Times a Day. 80 units @ night  60 units in morning     • isosorbide dinitrate (ISORDIL) 30 MG tablet Take 1 tablet by mouth 3 (Three) Times a Day. (Patient taking differently: Take 10 mg by mouth 3 (Three) Times a Day.) 270 tablet 3   • lamoTRIgine (LaMICtal) 200 MG tablet Take 1 tablet by mouth 2 (Two) Times a Day. 180 tablet 1   • levETIRAcetam (KEPPRA) 500 MG tablet Take 3 tablets by mouth Daily. 270 tablet 1   • levETIRAcetam (KEPPRA) 500 MG tablet Take 4 tablets by mouth Every Night. 360 tablet 1   • Liraglutide (VICTOZA SC) Inject 1.2 Units under the skin into the appropriate area as directed Every Night.     • lisinopril (PRINIVIL,ZESTRIL) 40 MG tablet Take 20 mg by  mouth Every Night.     • metFORMIN (GLUCOPHAGE) 1000 MG tablet Take 1 tablet by mouth 2 (Two) Times a Day With Meals. HOLD x 48 hours post contrast. May resume 3/18/18     • metoprolol tartrate (LOPRESSOR) 100 MG tablet Take 50 mg by mouth Every 12 (Twelve) Hours.     • Multiple Vitamin (MULTI VITAMIN PO) Take 1 tablet by mouth Daily.     • naproxen sodium (ALEVE) 220 MG tablet Take 220 mg by mouth Daily As Needed for Headache.     • nitroglycerin (NITROSTAT) 0.4 MG SL tablet Place 0.4 mg under the tongue Every 5 (Five) Minutes As Needed for chest pain. Take no more than 3 doses in 15 minutes.     • ondansetron ODT (ZOFRAN-ODT) 4 MG disintegrating tablet Take 1 tablet by mouth Every 6 (Six) Hours As Needed for Nausea or Vomiting. 8 tablet 0   • pantoprazole (PROTONIX) 40 MG EC tablet Take 40 mg by mouth 2 (Two) Times a Day.     • psyllium (METAMUCIL) 58.6 % packet Take 1 packet by mouth Daily As Needed (constipation).     • rosuvastatin (CRESTOR) 40 MG tablet Take 20 mg by mouth Every Night.     • triamterene-hydrochlorothiazide (MAXZIDE) 75-50 MG per tablet Take 0.5 tablets by mouth Daily.     • urea (CARMOL) 40 % ointment Apply 1 application topically Daily As Needed for Dry Skin. Apply to heels.     • ZOFRAN 8 MG tablet Take 1 tablet by mouth Every 8 (Eight) Hours As Needed for Nausea or Vomiting for up to 10 doses. 10 tablet 1     No current facility-administered medications for this visit.        Past Medical History:   Diagnosis Date   • Arthritis    • Asthma    • Carotid disease, bilateral (CMS/HCC)    • Chest pain    • Chronic ischemic heart disease    • Chronic sinusitis    • Maribell bullosa    • Coronary artery disease    • Deviated septum    • Diabetes mellitus (CMS/HCC)    • Difficulty urinating    • Diverticulitis    • Enlarged prostate    • Fatty liver    • GERD (gastroesophageal reflux disease)    • Hyperlipidemia    • Hypertension    • Hypertrophy of nasal turbinates    • Keratoderma    • Kidney stone     • Migraine    • Murmur, heart    • Myocardial infarction (CMS/HCC)    • Obesity    • PONV (postoperative nausea and vomiting)    • Psoriasis    • Seizures (CMS/HCC)    • Sinus congestion    • Sleep apnea    • SOB (shortness of breath)    • Stroke (CMS/HCC)    • UTI (urinary tract infection)        Past Surgical History:   Procedure Laterality Date   • CARDIAC CATHETERIZATION  01/2016    Dr. Broadbent; widely patent previously placed stents in the left anterior descending and obstructive disease involving the diagonal branch which was treated medically   • CARDIAC CATHETERIZATION N/A 7/14/2017    Procedure: Left Heart Cath;  Surgeon: Wade Ramey MD;  Location:  [facility] CATH INVASIVE LOCATION;  Service:    • CARDIAC CATHETERIZATION Left 10/15/2018    Procedure: Cardiac Catheterization/Vascular Study;  Surgeon: Wade Ramey MD;  Location:  [facility] CATH INVASIVE LOCATION;  Service: Cardiology   • CARDIAC CATHETERIZATION  10/15/2018    Procedure: Functional Flow [place];  Surgeon: Wade Ramey MD;  Location: [facility] CATH INVASIVE LOCATION;  Service: Cardiology   • CARDIAC CATHETERIZATION N/A 10/15/2018    Procedure: Left ventriculography;  Surgeon: Wade Ramey MD;  Location: [facility] CATH INVASIVE LOCATION;  Service: Cardiology   • CHOLECYSTECTOMY WITH INTRAOPERATIVE CHOLANGIOGRAM N/A 8/1/2018    Procedure: CHOLECYSTECTOMY LAPAROSCOPIC INTRAOPERATIVE CHOLANGIOGRAM;  Surgeon: Shane Ann MD;  Location: [facility] OR;  Service: General   • CORONARY ANGIOPLASTY     • CORONARY STENT PLACEMENT      x 6   • ENDOSCOPIC FUNCTIONAL SINUS SURGERY (FESS) Bilateral 12/13/2017    Procedure: PROCEDURE PERFORMED:  Bilateral functional endoscopic anterior ethmoidectomy with bilateral middle meatal antrostomy Septoplasty Right kathia bullosa resection Bilateral inferior turbinate reduction via Coblation;  Surgeon: Mayank Ibarra MD;  Location: [facility] OR;  Service:    • ENDOSCOPY N/A 7/30/2018    Procedure:  "ESOPHAGOGASTRODUODENOSCOPY WITH ANESTHESIA;  Surgeon: Benitez Mas MD;  Location: [facility] ENDOSCOPY;  Service: Gastroenterology   • HERNIA REPAIR      x2 inguinal area   • KIDNEY STONE SURGERY     • KNEE SURGERY Right    • OTHER SURGICAL HISTORY      urolift   • PROSTATE SURGERY      Dr. Badillo -    • THUMB AMPUTATION Left     partial   • TOE AMPUTATION Right     big       family history includes Heart disease in his father; No Known Problems in his mother.    Social History     Tobacco Use   • Smoking status: Former Smoker     Years: 4.50     Types: Cigarettes     Last attempt to quit:      Years since quittin.2   • Smokeless tobacco: Former User     Types: Chew     Quit date:    Substance Use Topics   • Alcohol use: No     Comment: quit    • Drug use: No       Review of Systems   Constitutional: Negative.  Negative for fatigue and fever.   HENT: Positive for sinus pressure. Negative for hearing loss and postnasal drip.    Eyes: Negative.  Negative for visual disturbance.   Respiratory: Negative.  Negative for apnea, chest tightness and shortness of breath.    Cardiovascular: Positive for syncope. Negative for chest pain.   Gastrointestinal: Negative.  Negative for bowel incontinence, constipation, diarrhea, nausea and vomiting.   Endocrine: Negative.         Denies night sweats   Genitourinary: Positive for bladder incontinence. Negative for dysuria and frequency.   Musculoskeletal: Positive for back pain. Negative for arthralgias, gait problem and myalgias.   Skin: Negative.    Allergic/Immunologic: Negative.    Neurological: Positive for seizures, loss of consciousness, syncope and headaches. Negative for dizziness, tremors and weakness.   Hematological: Negative.  Negative for adenopathy.   Psychiatric/Behavioral: Negative.  Negative for agitation, confusion and hallucinations.   All other systems reviewed and are negative.      Objective     /88   Pulse 82   Ht 182.9 cm (72\")   " Wt 112 kg (246 lb)   BMI 33.36 kg/m² , Body mass index is 33.36 kg/m².    Physical Exam   Constitutional: He is oriented to person, place, and time. Vital signs are normal. He appears well-developed and well-nourished. He is cooperative.   HENT:   Head: Normocephalic.   Right Ear: Hearing and external ear normal.   Left Ear: Hearing and external ear normal.   Nose: Nose normal.   Mouth/Throat: Oropharynx is clear and moist.   Eyes: Conjunctivae, EOM and lids are normal. Pupils are equal, round, and reactive to light. Right eye exhibits normal extraocular motion and no nystagmus. Left eye exhibits normal extraocular motion and no nystagmus. Right pupil is round and reactive. Left pupil is round and reactive. Pupils are equal.   Neck: Normal range of motion. Neck supple. Carotid bruit is not present.   Cardiovascular: Normal rate, regular rhythm and normal heart sounds.   No murmur heard.  Pulmonary/Chest: Effort normal and breath sounds normal. He has no decreased breath sounds. He has no rhonchi.   Abdominal: Soft. Bowel sounds are normal.   Musculoskeletal: Normal range of motion.       Neurological Sensory Findings -  Altered sharp/dull right ankle/foot discrimination (decrease pin/vibration distally to the ankle) and altered sharp/dull left ankle/foot discrimination (decrease pin/vibration distally to the ankle).  Neurological: He is alert and oriented to person, place, and time. He has normal strength and normal reflexes. He displays no tremor. No cranial nerve deficit or sensory deficit. He exhibits normal muscle tone. Coordination (patient has tendency to sway standing with eyes closed., no ataxia, FTN, HTS intact bilateral) abnormal. Gait normal.   Reflex Scores:       Tricep reflexes are 2+ on the right side and 2+ on the left side.       Bicep reflexes are 2+ on the right side and 2+ on the left side.       Brachioradialis reflexes are 2+ on the right side and 2+ on the left side.       Patellar reflexes  are 2+ on the right side and 2+ on the left side.       Achilles reflexes are 2+ on the right side and 2+ on the left side.  Awake, alert. No aphasia, no dysarthria  Completes simple and complex commands    CN II:  Visual fields full.  Pupils equally reactive to light  CN III, IV, VI:  Extraocular Muscles full with no signs of nystagmus  CN V:  Facial sensory is symmetric with no asymetries.  CN VII:  Facial motor symmetric  CN VIII:  Gross hearing intact bilaterally  CN IX:  Palate elevates symmetrically  CN X:  Palate elevates symmetrically  CN XI:  Shoulder shrug symmetric  CN XII:  Tongue is midline on protrusion    Full and symmetric strength bilateral upper and lower extremities.   Skin: Skin is warm and dry.   Psychiatric: He has a normal mood and affect. His speech is normal and behavior is normal. Cognition and memory are normal.   Nursing note and vitals reviewed.      Results for orders placed or performed during the hospital encounter of 02/14/19   Blood Culture - Blood, Arm, Left   Result Value Ref Range    Blood Culture Staphylococcus schleiferi (A)     Isolated from Aerobic and Anaerobic Bottles     Gram Stain Gram positive cocci in clusters        Susceptibility    Staphylococcus schleiferi - DM*     Clindamycin <=0.25 Susceptible ug/ml     Erythromycin <=0.25 Susceptible ug/ml     Gentamicin <=0.5 Susceptible ug/ml     Inducible Clindamycin Resistance NEG Negative ug/ml     Levofloxacin* <=0.12 Susceptible ug/ml      * Staphylococcus species may develop resistance during prolonged therapy with quinolones. Isolates that are initially susceptible may become resistant within three to four days after initiation of therapy. Testing of repeat isolates may be warranted.      Oxacillin <=0.25 Susceptible ug/ml     Penicillin G* <=0.03 Susceptible ug/ml      * Appended report. These results have been appended to a previously preliminary verified report.     Tetracycline <=1 Susceptible ug/ml     Vancomycin  <=0.5 Susceptible ug/ml     * This isolate does not demonstrate inducible clindamycin resistance in vitro.  Penicillin susceptibility to follow.     Blood Culture - Blood, Arm, Right   Result Value Ref Range    Blood Culture Staphylococcus schleiferi (A)     Isolated from Aerobic and Anaerobic Bottles     Gram Stain Gram positive cocci in clusters        Susceptibility    Staphylococcus schleiferi - DM*     Clindamycin <=0.25 Susceptible ug/ml     Erythromycin <=0.25 Susceptible ug/ml     Gentamicin <=0.5 Susceptible ug/ml     Inducible Clindamycin Resistance NEG Negative ug/ml     Levofloxacin* <=0.12 Susceptible ug/ml      * Staphylococcus species may develop resistance during prolonged therapy with quinolones. Isolates that are initially susceptible may become resistant within three to four days after initiation of therapy. Testing of repeat isolates may be warranted.      Oxacillin <=0.25 Susceptible ug/ml     Penicillin G* <=0.03 Susceptible ug/ml      * Appended report. These results have been appended to a previously preliminary verified report.     Tetracycline <=1 Susceptible ug/ml     Vancomycin <=0.5 Susceptible ug/ml     * This isolate does not demonstrate inducible clindamycin resistance in vitro.  Penicilli susceptibility to follow.   Influenza Antigen, Rapid - Swab, Nasopharynx   Result Value Ref Range    Influenza A Ag, EIA Negative Negative    Influenza B Ag, EIA Negative Negative   Blood Culture ID, PCR - Blood, Arm, Right   Result Value Ref Range    BCID, PCR No organism detected by BCID PCR. No organism detected by BCID PCR., Negative by BCID PCR. Culture to Follow.   Comprehensive Metabolic Panel   Result Value Ref Range    Glucose 155 (H) 70 - 100 mg/dL    BUN 16 5 - 21 mg/dL    Creatinine 1.00 0.50 - 1.40 mg/dL    Sodium 136 135 - 145 mmol/L    Potassium 4.3 3.5 - 5.3 mmol/L    Chloride 99 98 - 110 mmol/L    CO2 26.0 24.0 - 31.0 mmol/L    Calcium 9.4 8.4 - 10.4 mg/dL    Total Protein 7.8 6.3  - 8.7 g/dL    Albumin 5.00 3.50 - 5.00 g/dL    ALT (SGPT) 45 0 - 54 U/L    AST (SGOT) 42 7 - 45 U/L    Alkaline Phosphatase 93 24 - 120 U/L    Total Bilirubin 1.0 0.1 - 1.0 mg/dL    eGFR Non African Amer 76 >60 mL/min/1.73    Globulin 2.8 gm/dL    A/G Ratio 1.8 1.1 - 2.5 g/dL    BUN/Creatinine Ratio 16.0 7.0 - 25.0    Anion Gap 11.0 4.0 - 13.0 mmol/L   Protime-INR   Result Value Ref Range    Protime 12.6 11.9 - 14.6 Seconds    INR 0.92 0.91 - 1.09   Urinalysis With Culture If Indicated - Urine, Clean Catch   Result Value Ref Range    Color, UA Yellow Yellow, Straw    Appearance, UA Clear Clear    pH, UA <=5.0 5.0 - 8.0    Specific Gravity, UA 1.024 1.005 - 1.030    Glucose,  mg/dL (Trace) (A) Negative    Ketones, UA Negative Negative    Bilirubin, UA Negative Negative    Blood, UA Negative Negative    Protein, UA Negative Negative    Leuk Esterase, UA Negative Negative    Nitrite, UA Negative Negative    Urobilinogen, UA 0.2 E.U./dL 0.2 - 1.0 E.U./dL   Troponin   Result Value Ref Range    Troponin I <0.012 0.000 - 0.034 ng/mL   Lactic Acid, Plasma   Result Value Ref Range    Lactate 2.1 (C) 0.5 - 2.0 mmol/L   CBC Auto Differential   Result Value Ref Range    WBC 9.91 4.80 - 10.80 10*3/mm3    RBC 5.03 4.80 - 5.90 10*6/mm3    Hemoglobin 15.1 14.0 - 18.0 g/dL    Hematocrit 43.8 40.0 - 52.0 %    MCV 87.1 82.0 - 95.0 fL    MCH 30.0 28.0 - 32.0 pg    MCHC 34.5 33.0 - 36.0 g/dL    RDW 12.7 12.0 - 15.0 %    RDW-SD 39.8 (L) 40.0 - 54.0 fl    MPV 10.9 6.0 - 12.0 fL    Platelets 104 (L) 130 - 400 10*3/mm3    Neutrophil % 77.6 39.0 - 78.0 %    Lymphocyte % 12.1 (L) 15.0 - 45.0 %    Monocyte % 8.2 4.0 - 12.0 %    Eosinophil % 1.2 0.0 - 4.0 %    Basophil % 0.4 0.0 - 2.0 %    Neutrophils, Absolute 7.69 1.87 - 8.40 10*3/mm3    Lymphocytes, Absolute 1.20 0.72 - 4.86 10*3/mm3    Monocytes, Absolute 0.81 0.19 - 1.30 10*3/mm3    Eosinophils, Absolute 0.12 0.00 - 0.70 10*3/mm3    Basophils, Absolute 0.04 0.00 - 0.20 10*3/mm3    Lactic Acid, Reflex Timer (This will reflex a repeat order 3-3:15 hours after ordered.)   Result Value Ref Range    Extra Tube Hold for add-ons.    Blood Gas, Arterial   Result Value Ref Range    Site Right Radial     Thien's Test Positive     pH, Arterial 7.466 (H) 7.350 - 7.450 pH units    pCO2, Arterial 30.7 (L) 35.0 - 45.0 mm Hg    pO2, Arterial 98.5 83.0 - 108.0 mm Hg    HCO3, Arterial 22.1 20.0 - 26.0 mmol/L    Base Excess, Arterial -0.8 (L) 0.0 - 2.0 mmol/L    O2 Saturation, Arterial 98.3 94.0 - 99.0 %    Temperature 37.0 C    Barometric Pressure for Blood Gas 744 mmHg    Modality Room Air     FIO2 21 %    Ventilator Mode NA     Collected by [id number]       EEG: IMPRESSION: Normal EEG awake and asleep as above with some artifact noted as above.       CT HEAD: IMPRESSION:  1. No acute intracranial findings.   2. Vascular calcifications.  3. Mild thickening of the right maxillary sinus mucosa with changes of  probable prior sinus surgery. Trace right mastoid effusion.    ASSESSMENT/PLAN    Diagnoses and all orders for this visit:    Seizure disorder (CMS/HCC)    HOA on CPAP    Mixed hyperlipidemia    Chronic left arterial ischemic stroke, ICA (internal carotid artery)    Bilateral carotid artery disease, unspecified type (CMS/HCC)    Other orders  -     levETIRAcetam (KEPPRA) 500 MG tablet; Take 3 tablets by mouth Daily.  -     lamoTRIgine (LaMICtal) 200 MG tablet; Take 1 tablet by mouth 2 (Two) Times a Day.  -     levETIRAcetam (KEPPRA) 500 MG tablet; Take 4 tablets by mouth Every Night.      MEDICAL DECISION MAKING:  Uncertain etiology of positive blood cultures of staphylococcus schleiferi. Per medical record no history of pacemaker or heart valve prosthesis. Patient may need referral to infectious disease if develops returning symptoms.    1. Continue with CPAP   2.  Continue with Keppra 1500 mg in AM and 2000 mg in PM  3. Continue with lamictal 200 mg BID per PCP  4.  bp managed by PCP  5. Blood glucose  managed by PCP and A1C goal less than 7  6. Statin per PCP for LDL goal less than 70.  7. Continue with ASA for secondary stroke prevention  8. Patient is counseled on stroke signs and symptoms using FAST and Time Saved is Brain Saved.  9.will check labs, CBC,CMP, magnesium, TSH.  10.Seizure precautions were discussed to include no tub baths, no swimming, avoiding lack of sleep, and avoiding known triggers. Education given of things that may contribute to a seizure to include, but not limited to: stressful situations, fever, fatigue, lack of sleep, low blood sugar, hyperventilation, flashing lights, and caffeine. Instructions given to take seizure medications as prescribed. Education given to family member on what to do during a seizure and care following the seizure. Education given to contact this office prior to stopping or changing any medications.  11. Patient to monitor temperature and if elevated to 101 to contact PCP. If has seizure with elevated temperature he is to come to the ED to be evaluated.  12.cONTINUE gabapentin 100 mg at HS and counseled on side effects   13. At least 25 minutes spent in coordination of care and answering questions.          allergies and all known medications/prescriptions have been reviewed using resources available on this encounter.    Return in about 2 months (around 5/8/2019).        TEENA Win

## 2019-03-08 NOTE — PATIENT INSTRUCTIONS
No driving until May 20, 2019      Epilepsy  Epilepsy is a condition in which a person has repeated seizures over time. A seizure is a sudden burst of abnormal electrical and chemical activity in the brain. Seizures can cause a change in attention, behavior, or the ability to remain awake and alert (altered mental status).  Epilepsy increases a person's risk of falls, accidents, and injury. It can also lead to complications, including:  · Depression.  · Poor memory.  · Sudden unexplained death in epilepsy (SUDEP). This complication is rare, and its cause is not known.    Most people with epilepsy lead normal lives.  What are the causes?  This condition may be caused by:  · A head injury.  · An injury that happens at birth.  · A high fever during childhood.  · A stroke.  · Bleeding that goes into or around the brain.  · Certain medicines and drugs.  · Having too little oxygen for a long period of time.  · Abnormal brain development.  · Certain infections, such as meningitis and encephalitis.  · Brain tumors.  · Conditions that are passed along from parent to child (are hereditary).    What are the signs or symptoms?  Symptoms of a seizure vary greatly from person to person. They include:  · Convulsions.  · Stiffening of the body.  · Involuntary movements of the arms or legs.  · Loss of consciousness.  · Breathing problems.  · Falling suddenly.  · Confusion.  · Head nodding.  · Eye blinking or fluttering.  · Lip smacking.  · Drooling.  · Rapid eye movements.  · Grunting.  · Loss of bladder control and bowel control.  · Staring.  · Unresponsiveness.    Some people have symptoms right before a seizure happens (aura) and right after a seizure happens. Symptoms of an aura include:  · Fear or anxiety.  · Nausea.  · Feeling like the room is spinning (vertigo).  · A feeling of having seen or heard something before (crystal vu).  · Odd tastes or smells.  · Changes in vision, such as seeing flashing lights or spots.    Symptoms  that follow a seizure include:  · Confusion.  · Sleepiness.  · Headache.    How is this diagnosed?  This condition is diagnosed based on:  · Your symptoms.  · Your medical history.  · A physical exam.  · A neurological exam. A neurological exam is similar to a physical exam. It involves checking your strength, reflexes, coordination, and sensations.  · Tests, such as:  ? An electroencephalogram (EEG). This is a painless test that creates a diagram of your brain waves.  ? An MRI of the brain.  ? A CT scan of the brain.  ? A lumbar puncture, also called a spinal tap.  ? Blood tests to check for signs of infection or abnormal blood chemistry.    How is this treated?  There is no cure for this condition, but treatment can help control seizures. Treatment may involve:  · Taking medicines to control seizures. These include medicines to prevent seizures and medicines to stop seizures as they occur.  · Having a device called a vagus nerve stimulator implanted in the chest. The device sends electrical impulses to the vagus nerve and to the brain to prevent seizures. This treatment may be recommended if medicines do not help.  · Brain surgery. There are several kinds of surgeries that may be done to stop seizures from happening or to reduce how often seizures happen.  · Having regular blood tests. You may need to have blood tests regularly to check that you are getting the right amount of medicine.    Once this condition has been diagnosed, it is important to begin treatment as soon as possible. For some people, epilepsy eventually goes away.  Follow these instructions at home:  Medicines    · Take over-the-counter and prescription medicines only as told by your health care provider.  · Avoid any substances that may prevent your medicine from working properly, such as alcohol.  Activity  · Get enough rest. Lack of sleep can make seizures more likely to occur.  · Follow instructions from your health care provider about  driving, swimming, and doing any other activities that would be dangerous if you had a seizure.  Educating others  Teach friends and family what to do if you have a seizure. They should:  · Lay you on the ground to prevent a fall.  · Cushion your head and body.  · Loosen any tight clothing around your neck.  · Turn you on your side. If vomiting occurs, this helps keep your airway clear.  · Stay with you until you recover.  · Not hold you down. Holding you down will not stop the seizure.  · Not put anything in your mouth.  · Know whether or not you need emergency care.    General instructions  · Avoid anything that has ever triggered a seizure for you.  · Keep a seizure diary. Record what you remember about each seizure, especially anything that might have triggered the seizure.  · Keep all follow-up visits as told by your health care provider. This is important.  Contact a health care provider if:  · Your seizure pattern changes.  · You have symptoms of infection or another illness. This might increase your risk of having a seizure.  Get help right away if:  · You have a seizure that does not stop after 5 minutes.  · You have several seizures in a row without a complete recovery in between seizures.  · You have a seizure that makes it harder to breathe.  · You have a seizure that is different from previous seizures.  · You have a seizure that leaves you unable to speak or use a part of your body.  · You did not wake up immediately after a seizure.  This information is not intended to replace advice given to you by your health care provider. Make sure you discuss any questions you have with your health care provider.  Document Released: 12/18/2006 Document Revised: 07/15/2017 Document Reviewed: 06/27/2017  [employer] Patient Education © 2018 [employer]  BMI for Adults  Body mass index (BMI) is a number that is calculated from a person's weight and height. In most adults, the number is used to find how  much of an adult's weight is made up of fat. BMI is not as accurate as a direct measure of body fat.  How is BMI calculated?  BMI is calculated by dividing weight in kilograms by height in meters squared. It can also be calculated by dividing weight in pounds by height in inches squared, then multiplying the resulting number by 703. Charts are available to help you find your BMI quickly and easily without doing this calculation.  How is BMI interpreted?  Health care professionals use BMI charts to identify whether an adult is underweight, at a normal weight, or overweight based on the following guidelines:  · Underweight: BMI less than 18.5.  · Normal weight: BMI between 18.5 and 24.9.  · Overweight: BMI between 25 and 29.9.  · Obese: BMI of 30 and above.    BMI is usually interpreted the same for males and females.  Weight includes both fat and muscle, so someone with a muscular build, such as an athlete, may have a BMI that is higher than 24.9. In cases like these, BMI may not accurately depict body fat. To determine if excess body fat is the cause of a BMI of 25 or higher, further assessments may need to be done by a health care provider.  Why is BMI a useful tool?  BMI is used to identify a possible weight problem that may be related to a medical problem or may increase the risk for medical problems. BMI can also be used to promote changes to reach a healthy weight.  This information is not intended to replace advice given to you by your health care provider. Make sure you discuss any questions you have with your health care provider.  Document Released: 08/29/2005 Document Revised: 04/27/2017 Document Reviewed: 05/15/2015  [employer] Interactive Patient Education © 2018 [employer]

## 2019-03-12 ENCOUNTER — LAB (OUTPATIENT)
Dept: LAB | Facility: HOSPITAL | Age: 59
End: 2019-03-12

## 2019-03-12 DIAGNOSIS — G40.909 SEIZURE DISORDER (HCC): ICD-10-CM

## 2019-03-12 LAB
ALBUMIN SERPL-MCNC: 4.8 G/DL (ref 3.5–5)
ALBUMIN/GLOB SERPL: 1.5 G/DL (ref 1.1–2.5)
ALP SERPL-CCNC: 93 U/L (ref 24–120)
ALT SERPL W P-5'-P-CCNC: 40 U/L (ref 0–54)
ANION GAP SERPL CALCULATED.3IONS-SCNC: 14 MMOL/L (ref 4–13)
AST SERPL-CCNC: 35 U/L (ref 7–45)
BASOPHILS # BLD AUTO: 0.04 10*3/MM3 (ref 0–0.2)
BASOPHILS NFR BLD AUTO: 0.9 % (ref 0–2)
BILIRUB SERPL-MCNC: 0.7 MG/DL (ref 0.1–1)
BUN BLD-MCNC: 20 MG/DL (ref 5–21)
BUN/CREAT SERPL: 17.7 (ref 7–25)
CALCIUM SPEC-SCNC: 9.9 MG/DL (ref 8.4–10.4)
CHLORIDE SERPL-SCNC: 101 MMOL/L (ref 98–110)
CO2 SERPL-SCNC: 26 MMOL/L (ref 24–31)
CREAT BLD-MCNC: 1.13 MG/DL (ref 0.5–1.4)
DEPRECATED RDW RBC AUTO: 40.1 FL (ref 40–54)
EOSINOPHIL # BLD AUTO: 0.16 10*3/MM3 (ref 0–0.7)
EOSINOPHIL NFR BLD AUTO: 3.7 % (ref 0–4)
ERYTHROCYTE [DISTWIDTH] IN BLOOD BY AUTOMATED COUNT: 12.7 % (ref 12–15)
GFR SERPL CREATININE-BSD FRML MDRD: 66 ML/MIN/1.73
GLOBULIN UR ELPH-MCNC: 3.1 GM/DL
GLUCOSE BLD-MCNC: 238 MG/DL (ref 70–100)
HCT VFR BLD AUTO: 42.5 % (ref 40–52)
HGB BLD-MCNC: 14.1 G/DL (ref 14–18)
LYMPHOCYTES # BLD AUTO: 1.61 10*3/MM3 (ref 0.72–4.86)
LYMPHOCYTES NFR BLD AUTO: 37.5 % (ref 15–45)
MAGNESIUM SERPL-MCNC: 2 MG/DL (ref 1.4–2.2)
MCH RBC QN AUTO: 29 PG (ref 28–32)
MCHC RBC AUTO-ENTMCNC: 33.2 G/DL (ref 33–36)
MCV RBC AUTO: 87.3 FL (ref 82–95)
MONOCYTES # BLD AUTO: 0.38 10*3/MM3 (ref 0.19–1.3)
MONOCYTES NFR BLD AUTO: 8.9 % (ref 4–12)
NEUTROPHILS # BLD AUTO: 2.05 10*3/MM3 (ref 1.87–8.4)
NEUTROPHILS NFR BLD AUTO: 47.8 % (ref 39–78)
PLATELET # BLD AUTO: 96 10*3/MM3 (ref 130–400)
PMV BLD AUTO: 11.1 FL (ref 6–12)
POTASSIUM BLD-SCNC: 4.8 MMOL/L (ref 3.5–5.3)
PROT SERPL-MCNC: 7.9 G/DL (ref 6.3–8.7)
RBC # BLD AUTO: 4.87 10*6/MM3 (ref 4.8–5.9)
SODIUM BLD-SCNC: 141 MMOL/L (ref 135–145)
WBC NRBC COR # BLD: 4.29 10*3/MM3 (ref 4.8–10.8)

## 2019-03-12 PROCEDURE — 80053 COMPREHEN METABOLIC PANEL: CPT | Performed by: CLINICAL NURSE SPECIALIST

## 2019-03-12 PROCEDURE — 85025 COMPLETE CBC W/AUTO DIFF WBC: CPT | Performed by: CLINICAL NURSE SPECIALIST

## 2019-03-12 PROCEDURE — 36415 COLL VENOUS BLD VENIPUNCTURE: CPT

## 2019-03-12 PROCEDURE — 80175 DRUG SCREEN QUAN LAMOTRIGINE: CPT | Performed by: CLINICAL NURSE SPECIALIST

## 2019-03-12 PROCEDURE — 83735 ASSAY OF MAGNESIUM: CPT | Performed by: CLINICAL NURSE SPECIALIST

## 2019-03-12 PROCEDURE — 80177 DRUG SCRN QUAN LEVETIRACETAM: CPT | Performed by: CLINICAL NURSE SPECIALIST

## 2019-03-14 ENCOUNTER — TELEPHONE (OUTPATIENT)
Dept: NEUROLOGY | Facility: CLINIC | Age: 59
End: 2019-03-14

## 2019-03-14 LAB
LAMOTRIGINE SERPL-MCNC: 5.3 UG/ML (ref 2–20)
LEVETIRACETAM SERPL-MCNC: 11.1 UG/ML (ref 10–40)

## 2019-03-14 NOTE — TELEPHONE ENCOUNTER
Daniela notified of lab results.  She said glucose of 200 isn't unusual for him.  She will have him contact his PCP about the lab results.  She said he continues to have nasal drainage.  Someone she knows had nasal drainage that was spinal fluid.  She is concerned that his nasal drainage may be spinal drainage.

## 2019-05-01 ENCOUNTER — HOSPITAL ENCOUNTER (EMERGENCY)
Facility: HOSPITAL | Age: 59
Discharge: HOME | End: 2019-05-01
Attending: EMERGENCY MEDICINE | Admitting: EMERGENCY MEDICINE

## 2019-05-01 ENCOUNTER — APPOINTMENT (OUTPATIENT)
Dept: CT IMAGING | Facility: HOSPITAL | Age: 59
End: 2019-05-01

## 2019-05-01 VITALS
WEIGHT: 250.8 LBS | RESPIRATION RATE: 17 BRPM | OXYGEN SATURATION: 95 % | DIASTOLIC BLOOD PRESSURE: 94 MMHG | HEIGHT: 72 IN | BODY MASS INDEX: 33.97 KG/M2 | HEART RATE: 75 BPM | TEMPERATURE: 98 F | SYSTOLIC BLOOD PRESSURE: 153 MMHG

## 2019-05-01 DIAGNOSIS — I25.84 CORONARY ARTERY CALCIFICATION: ICD-10-CM

## 2019-05-01 DIAGNOSIS — I25.10 CORONARY ARTERY CALCIFICATION: ICD-10-CM

## 2019-05-01 DIAGNOSIS — R30.0 DYSURIA: ICD-10-CM

## 2019-05-01 DIAGNOSIS — R73.9 HYPERGLYCEMIA: ICD-10-CM

## 2019-05-01 DIAGNOSIS — R10.9 FLANK PAIN: Primary | ICD-10-CM

## 2019-05-01 DIAGNOSIS — N20.0 KIDNEY STONE: ICD-10-CM

## 2019-05-01 DIAGNOSIS — D69.6 THROMBOCYTOPENIA (HCC): ICD-10-CM

## 2019-05-01 LAB
ALBUMIN SERPL-MCNC: 4.3 G/DL (ref 3.5–5)
ALBUMIN/GLOB SERPL: 1.5 G/DL (ref 1.1–2.5)
ALP SERPL-CCNC: 105 U/L (ref 24–120)
ALT SERPL W P-5'-P-CCNC: 21 U/L (ref 0–54)
ANION GAP SERPL CALCULATED.3IONS-SCNC: 9 MMOL/L (ref 4–13)
AST SERPL-CCNC: 27 U/L (ref 7–45)
BASOPHILS # BLD AUTO: 0.03 10*3/MM3 (ref 0–0.2)
BASOPHILS NFR BLD AUTO: 0.6 % (ref 0–2)
BILIRUB SERPL-MCNC: 0.7 MG/DL (ref 0.1–1)
BILIRUB UR QL STRIP: NEGATIVE
BUN BLD-MCNC: 17 MG/DL (ref 5–21)
BUN/CREAT SERPL: 14.7 (ref 7–25)
CALCIUM SPEC-SCNC: 9.4 MG/DL (ref 8.4–10.4)
CHLORIDE SERPL-SCNC: 106 MMOL/L (ref 98–110)
CLARITY UR: CLEAR
CO2 SERPL-SCNC: 24 MMOL/L (ref 24–31)
COLOR UR: YELLOW
CREAT BLD-MCNC: 1.16 MG/DL (ref 0.5–1.4)
DEPRECATED RDW RBC AUTO: 39.9 FL (ref 40–54)
EOSINOPHIL # BLD AUTO: 0.21 10*3/MM3 (ref 0–0.7)
EOSINOPHIL NFR BLD AUTO: 4.2 % (ref 0–4)
ERYTHROCYTE [DISTWIDTH] IN BLOOD BY AUTOMATED COUNT: 13 % (ref 12–15)
GFR SERPL CREATININE-BSD FRML MDRD: 64 ML/MIN/1.73
GLOBULIN UR ELPH-MCNC: 2.9 GM/DL
GLUCOSE BLD-MCNC: 203 MG/DL (ref 70–100)
GLUCOSE UR STRIP-MCNC: ABNORMAL MG/DL
HCT VFR BLD AUTO: 36.7 % (ref 40–52)
HGB BLD-MCNC: 12.8 G/DL (ref 14–18)
HGB UR QL STRIP.AUTO: NEGATIVE
IMM GRANULOCYTES # BLD AUTO: 0.02 10*3/MM3 (ref 0–0.05)
IMM GRANULOCYTES NFR BLD AUTO: 0.4 % (ref 0–5)
KETONES UR QL STRIP: NEGATIVE
LEUKOCYTE ESTERASE UR QL STRIP.AUTO: NEGATIVE
LYMPHOCYTES # BLD AUTO: 2.09 10*3/MM3 (ref 0.72–4.86)
LYMPHOCYTES NFR BLD AUTO: 41.7 % (ref 15–45)
MCH RBC QN AUTO: 30 PG (ref 28–32)
MCHC RBC AUTO-ENTMCNC: 34.9 G/DL (ref 33–36)
MCV RBC AUTO: 86.2 FL (ref 82–95)
MONOCYTES # BLD AUTO: 0.41 10*3/MM3 (ref 0.19–1.3)
MONOCYTES NFR BLD AUTO: 8.2 % (ref 4–12)
NEUTROPHILS # BLD AUTO: 2.25 10*3/MM3 (ref 1.87–8.4)
NEUTROPHILS NFR BLD AUTO: 44.9 % (ref 39–78)
NITRITE UR QL STRIP: NEGATIVE
NRBC BLD AUTO-RTO: 0 /100 WBC (ref 0–0.2)
PH UR STRIP.AUTO: 6.5 [PH] (ref 5–8)
PLATELET # BLD AUTO: 124 10*3/MM3 (ref 130–400)
PMV BLD AUTO: 11.1 FL (ref 6–12)
POTASSIUM BLD-SCNC: 4.4 MMOL/L (ref 3.5–5.3)
PROT SERPL-MCNC: 7.2 G/DL (ref 6.3–8.7)
PROT UR QL STRIP: NEGATIVE
RBC # BLD AUTO: 4.26 10*6/MM3 (ref 4.8–5.9)
SODIUM BLD-SCNC: 139 MMOL/L (ref 135–145)
SP GR UR STRIP: 1.03 (ref 1–1.03)
UROBILINOGEN UR QL STRIP: ABNORMAL
WBC NRBC COR # BLD: 5.01 10*3/MM3 (ref 4.8–10.8)

## 2019-05-01 PROCEDURE — 81003 URINALYSIS AUTO W/O SCOPE: CPT | Performed by: EMERGENCY MEDICINE

## 2019-05-01 PROCEDURE — 96374 THER/PROPH/DIAG INJ IV PUSH: CPT

## 2019-05-01 PROCEDURE — 85025 COMPLETE CBC W/AUTO DIFF WBC: CPT | Performed by: EMERGENCY MEDICINE

## 2019-05-01 PROCEDURE — 99283 EMERGENCY DEPT VISIT LOW MDM: CPT

## 2019-05-01 PROCEDURE — 25010000002 MORPHINE PER 10 MG: Performed by: EMERGENCY MEDICINE

## 2019-05-01 PROCEDURE — 74176 CT ABD & PELVIS W/O CONTRAST: CPT

## 2019-05-01 PROCEDURE — 80053 COMPREHEN METABOLIC PANEL: CPT | Performed by: EMERGENCY MEDICINE

## 2019-05-01 RX ORDER — SODIUM CHLORIDE 0.9 % (FLUSH) 0.9 %
10 SYRINGE (ML) INJECTION AS NEEDED
Status: DISCONTINUED | OUTPATIENT
Start: 2019-05-01 | End: 2019-05-02 | Stop reason: HOSPADM

## 2019-05-01 RX ADMIN — MORPHINE SULFATE 4 MG: 4 INJECTION INTRAVENOUS at 19:59

## 2019-05-01 RX ADMIN — SODIUM CHLORIDE 1000 ML: 9 INJECTION, SOLUTION INTRAVENOUS at 20:02

## 2019-05-02 NOTE — ED PROVIDER NOTES
[facility]  emergency department encounter      Pt Name: Carlos A Boyer Jr.  MRN: [id number]  Birthdate 1960  Date of evaluation: 5/1/2019      CHIEF COMPLAINT       Chief Complaint   Patient presents with   • Flank Pain       Nurses Notes reviewed and I agree except as noted in the HPI.      HISTORY OF PRESENT ILLNESS    Carlos A Boyer Jr. is a 59 y.o. male who presents to the emergency department complaining of 1 day of left flank pain and dysuria.  He notes a history of cardiac stents and kidney stones.  His urologist Dr. Badillo.  He denies nausea, vomiting, diarrhea, numbness, tingling, weakness, fever, chills, chest pain, shortness of breath.    REVIEW OF SYSTEMS     All systems reviewed and otherwise negative except as listed above in HPI      PAST MEDICAL HISTORY     Past Medical History:   Diagnosis Date   • Arthritis    • Asthma    • Carotid disease, bilateral (CMS/HCC)    • Chest pain    • Chronic ischemic heart disease    • Chronic sinusitis    • Maribell bullosa    • Coronary artery disease    • Deviated septum    • Diabetes mellitus (CMS/HCC)    • Difficulty urinating    • Diverticulitis    • Enlarged prostate    • Fatty liver    • GERD (gastroesophageal reflux disease)    • Hyperlipidemia    • Hypertension    • Hypertrophy of nasal turbinates    • Keratoderma    • Kidney stone    • Migraine    • Murmur, heart    • Myocardial infarction (CMS/HCC)    • Obesity    • PONV (postoperative nausea and vomiting)    • Psoriasis    • Seizures (CMS/HCC)    • Sinus congestion    • Sleep apnea    • SOB (shortness of breath)    • Stroke (CMS/HCC)    • UTI (urinary tract infection)        SURGICAL HISTORY      has a past surgical history that includes Kidney stone surgery; Cardiac catheterization (01/2016); Coronary angioplasty; Knee surgery (Right); Thumb amputation (Left); Toe amputation (Right); Cardiac catheterization (N/A, 7/14/2017); Hernia repair; Other surgical history; Functional  Endoscopic Sinus Surgery (FESS) (Bilateral, 12/13/2017); Coronary stent placement; Prostate surgery; Esophagogastroduodenoscopy (N/A, 7/30/2018); cholecystectomy with intraoperative cholangiogram (N/A, 8/1/2018); Cardiac catheterization (Left, 10/15/2018); Cardiac catheterization (10/15/2018); and Cardiac catheterization (N/A, 10/15/2018).    CURRENT MEDICATIONS        Medication List      CHANGE how you take these medications    isosorbide dinitrate 30 MG tablet  Commonly known as:  ISORDIL  Take 1 tablet by mouth 3 (Three) Times a Day.  What changed:  how much to take        CONTINUE taking these medications    albuterol sulfate  (90 Base) MCG/ACT inhaler  Commonly known as:  PROVENTIL HFA;VENTOLIN HFA;PROAIR HFA     aspirin 81 MG chewable tablet     carboxymethylcellulose 0.5 % solution  Commonly known as:  REFRESH PLUS     dicyclomine 20 MG tablet  Commonly known as:  BENTYL  Take 1 tablet by mouth Every 6 (Six) Hours As Needed (abdominal pain).     fluticasone 50 MCG/ACT nasal spray  Commonly known as:  FLONASE     gabapentin 100 MG capsule  Commonly known as:  NEURONTIN  Take 1 capsule by mouth Every Night.     HYDROcodone-acetaminophen 7.5-325 MG per tablet  Commonly known as:  NORCO  Take 1 tablet by mouth Every 4 (Four) Hours As Needed for Moderate Pain    for up to 40 doses.     insulin aspart 100 UNIT/ML solution pen-injector sc pen  Commonly known as:  novoLOG FLEXPEN     Insulin Glargine 100 UNIT/ML injection pen  Commonly known as:  LANTUS SOLOSTAR     lamoTRIgine 200 MG tablet  Commonly known as:  LaMICtal  Take 1 tablet by mouth 2 (Two) Times a Day.     * levETIRAcetam 500 MG tablet  Commonly known as:  KEPPRA  Take 3 tablets by mouth Daily.     * levETIRAcetam 500 MG tablet  Commonly known as:  KEPPRA  Take 4 tablets by mouth Every Night.     lisinopril 40 MG tablet  Commonly known as:  PRINIVIL,ZESTRIL     metFORMIN 1000 MG tablet  Commonly known as:  GLUCOPHAGE  Take 1 tablet by mouth 2  "(Two) Times a Day With Meals. HOLD x 48 hours   post contrast. May resume 3/18/18     metoprolol tartrate 100 MG tablet  Commonly known as:  LOPRESSOR     MULTI VITAMIN PO     naproxen sodium 220 MG tablet  Commonly known as:  ALEVE     nitroglycerin 0.4 MG SL tablet  Commonly known as:  NITROSTAT     ondansetron ODT 4 MG disintegrating tablet  Commonly known as:  ZOFRAN-ODT  Take 1 tablet by mouth Every 6 (Six) Hours As Needed for Nausea or   Vomiting.     pantoprazole 40 MG EC tablet  Commonly known as:  PROTONIX     psyllium 58.6 % packet  Commonly known as:  METAMUCIL     rosuvastatin 40 MG tablet  Commonly known as:  CRESTOR     triamterene-hydrochlorothiazide 75-50 MG per tablet  Commonly known as:  MAXZIDE     urea 40 % ointment  Commonly known as:  CARMOL     VICTOZA SC     ZOFRAN 8 MG tablet  Generic drug:  ondansetron  Take 1 tablet by mouth Every 8 (Eight) Hours As Needed for Nausea or   Vomiting for up to 10 doses.         * This list has 2 medication(s) that are the same as other medications   prescribed for you. Read the directions carefully, and ask your doctor or   other care provider to review them with you.              ALLERGIES     is allergic to flagyl [metronidazole]; atorvastatin; and ciprofloxacin.    FAMILY HISTORY     indicated that his mother is . He indicated that his father is .   family history includes Heart disease in his father; No Known Problems in his mother.    SOCIAL HISTORY      reports that he quit smoking about 26 years ago. His smoking use included cigarettes. He quit after 4.50 years of use. He quit smokeless tobacco use about 10 years ago. His smokeless tobacco use included chew. He reports that he does not drink alcohol or use drugs.    PHYSICAL EXAM     INITIAL VITALS:  height is 182.9 cm (72\") and weight is 114 kg (250 lb 12.8 oz). His oral temperature is 98.1 °F (36.7 °C). His blood pressure is 160/83 and his pulse is 82. His respiration is 17 and oxygen " saturation is 95%.    Physical Exam    CONSTITUTIONAL: Well developed, well nourished, not diaphoretic nor distressed  HENT: Normocephalic, atraumatic, oropharynx clear and moist  EYES: PERRL, EOM normal, no discharge, no scleral icterus  NECK: ROM normal, supple, no tracheal deviation nor JVD, no stridor  CARDIOVASCULAR: Normal rate and rhythm, heart sounds normal, no rub no gallop, intact distal pulses, normal cap refill  PULMONARY: Normal effort and breath sounds, no distress, no wheezes, rhonchi or rales, no chest tenderness  ABDOMINAL: Soft, left CVA tenderness, abdomen is nontender, no guarding, no mass, no rebound, no hernia  GENITOURINARY/ANORECTAL: deferred  MUSCULOSKELETAL: ROM normal, no tenderness nor deformity, no edema  NEUROLOGICAL: Alert, oriented x 3,  normal tone, sensation normal  SKIN: Warm, dry, no erythema, no rash, normal color  PSYCH: Mood and affect normal, behavior normal, thought content and judgement normal.      DIAGNOSTIC RESULTS     EKG: All EKG's are interpreted by the Emergency Department Physician who either signs or Co-signs this chart in the absence of a cardiologist.  none    RADIOLOGY: non-plain film images(s) such as CT, Ultrasound and MRI are read by the radiologist.  Plain radiographic images are visualized and preliminarily interpreted by the emergency physician unless otherwise stated below.  Ct Abdomen Pelvis Stone Protocol    Result Date: 5/1/2019  1. No evidence of acute abdominopelvic process. 2. Nonobstructive LEFT renal stone is stable since the previous exam. 3. Coronary artery calcifications.  This report was finalized on 05/01/2019 21:01 by Dr. Delio Langston MD.             LABS:   Lab Results (last 24 hours)     Procedure Component Value Units Date/Time    Urinalysis With Culture If Indicated - Urine, Clean Catch [[id number]]  (Abnormal) Collected:  05/01/19 1955    Specimen:  Urine, Clean Catch Updated:  05/01/19 2002     Color, UA Yellow     Appearance, UA Clear      pH, UA 6.5     Specific Gravity, UA 1.028     Glucose, UA >=1000 mg/dL (3+)     Ketones, UA Negative     Bilirubin, UA Negative     Blood, UA Negative     Protein, UA Negative     Leuk Esterase, UA Negative     Nitrite, UA Negative     Urobilinogen, UA 1.0 E.U./dL    Narrative:       Urine microscopic not indicated.    CBC & Differential [[id number]] Collected:  05/01/19 2001    Specimen:  Blood Updated:  05/01/19 2015    Narrative:       The following orders were created for panel order CBC & Differential.  Procedure                               Abnormality         Status                     ---------                               -----------         ------                     CBC Auto Differential[[id number]]        Abnormal            Final result                 Please view results for these tests on the individual orders.    Comprehensive Metabolic Panel [[id number]]  (Abnormal) Collected:  05/01/19 2001    Specimen:  Blood Updated:  05/01/19 2022     Glucose 203 mg/dL      BUN 17 mg/dL      Creatinine 1.16 mg/dL      Sodium 139 mmol/L      Potassium 4.4 mmol/L      Chloride 106 mmol/L      CO2 24.0 mmol/L      Calcium 9.4 mg/dL      Total Protein 7.2 g/dL      Albumin 4.30 g/dL      ALT (SGPT) 21 U/L      AST (SGOT) 27 U/L      Alkaline Phosphatase 105 U/L      Total Bilirubin 0.7 mg/dL      eGFR Non African Amer 64 mL/min/1.73      Globulin 2.9 gm/dL      A/G Ratio 1.5 g/dL      BUN/Creatinine Ratio 14.7     Anion Gap 9.0 mmol/L     Narrative:       GFR Normal >60  Chronic Kidney Disease <60  Kidney Failure <15    CBC Auto Differential [[id number]]  (Abnormal) Collected:  05/01/19 2001    Specimen:  Blood Updated:  05/01/19 2015     WBC 5.01 10*3/mm3      RBC 4.26 10*6/mm3      Hemoglobin 12.8 g/dL      Hematocrit 36.7 %      MCV 86.2 fL      MCH 30.0 pg      MCHC 34.9 g/dL      RDW 13.0 %      RDW-SD 39.9 fl      MPV 11.1 fL      Platelets 124 10*3/mm3      Neutrophil % 44.9 %      Lymphocyte % 41.7 %       "Monocyte % 8.2 %      Eosinophil % 4.2 %      Basophil % 0.6 %      Immature Grans % 0.4 %      Neutrophils, Absolute 2.25 10*3/mm3      Lymphocytes, Absolute 2.09 10*3/mm3      Monocytes, Absolute 0.41 10*3/mm3      Eosinophils, Absolute 0.21 10*3/mm3      Basophils, Absolute 0.03 10*3/mm3      Immature Grans, Absolute 0.02 10*3/mm3      nRBC 0.0 /100 WBC           EMERGENCY DEPARTMENT COURSE:   Vitals:    Vitals:    05/01/19 1912   BP: 160/83   Patient Position: Sitting   Pulse: 82   Resp: 17   Temp: 98.1 °F (36.7 °C)   TempSrc: Oral   SpO2: 95%   Weight: 114 kg (250 lb 12.8 oz)   Height: 182.9 cm (72\")       The patient was given the following medications:  Medications   sodium chloride 0.9 % flush 10 mL (not administered)   sodium chloride 0.9 % bolus 1,000 mL (1,000 mL Intravenous New Bag 5/1/19 2002)   morphine injection 4 mg (4 mg Intravenous Given 5/1/19 1959)            CRITICAL CARE:  none    CONSULTS:  none    PROCEDURES:  Procedures        Patient presents with left flank pain and dysuria.  He has left CVA tenderness with an otherwise unremarkable exam.  Labs show hyperglycemia, thrombocytopenia, and glucose in the urine.  He notes that this platelet count is actually increased from most recent platelet counts.  CT abdomen and pelvis shows a left renal stone but no ureteral stones.  There is also evidence of coronary artery calcifications.  He is a symptom medic after IV fluids and morphine.  He will be discharged home to follow-up with his PCP.  He is comfortable at time of discharge and agreeable with plan of care.    FINAL IMPRESSION      1. Flank pain    2. Dysuria    3. Kidney stone    4. Coronary artery calcification    5. Hyperglycemia    6. Thrombocytopenia (CMS/[facility])          DISPOSITION/PLAN   Discharge      PATIENT REFERRED TO:  Vinayak Correia PA  [address]  [place] [address] [phone]    Schedule an appointment as soon as possible for a visit in 1 day        DISCHARGE " MEDICATIONS:     Medication List      CHANGE how you take these medications    isosorbide dinitrate 30 MG tablet  Commonly known as:  ISORDIL  Take 1 tablet by mouth 3 (Three) Times a Day.  What changed:  how much to take        CONTINUE taking these medications    albuterol sulfate  (90 Base) MCG/ACT inhaler  Commonly known as:  PROVENTIL HFA;VENTOLIN HFA;PROAIR HFA     aspirin 81 MG chewable tablet     carboxymethylcellulose 0.5 % solution  Commonly known as:  REFRESH PLUS     dicyclomine 20 MG tablet  Commonly known as:  BENTYL  Take 1 tablet by mouth Every 6 (Six) Hours As Needed (abdominal pain).     fluticasone 50 MCG/ACT nasal spray  Commonly known as:  FLONASE     gabapentin 100 MG capsule  Commonly known as:  NEURONTIN  Take 1 capsule by mouth Every Night.     HYDROcodone-acetaminophen 7.5-325 MG per tablet  Commonly known as:  NORCO  Take 1 tablet by mouth Every 4 (Four) Hours As Needed for Moderate Pain    for up to 40 doses.     insulin aspart 100 UNIT/ML solution pen-injector sc pen  Commonly known as:  novoLOG FLEXPEN     Insulin Glargine 100 UNIT/ML injection pen  Commonly known as:  LANTUS SOLOSTAR     lamoTRIgine 200 MG tablet  Commonly known as:  LaMICtal  Take 1 tablet by mouth 2 (Two) Times a Day.     * levETIRAcetam 500 MG tablet  Commonly known as:  KEPPRA  Take 3 tablets by mouth Daily.     * levETIRAcetam 500 MG tablet  Commonly known as:  KEPPRA  Take 4 tablets by mouth Every Night.     lisinopril 40 MG tablet  Commonly known as:  PRINIVIL,ZESTRIL     metFORMIN 1000 MG tablet  Commonly known as:  GLUCOPHAGE  Take 1 tablet by mouth 2 (Two) Times a Day With Meals. HOLD x 48 hours   post contrast. May resume 3/18/18     metoprolol tartrate 100 MG tablet  Commonly known as:  LOPRESSOR     MULTI VITAMIN PO     naproxen sodium 220 MG tablet  Commonly known as:  ALEVE     nitroglycerin 0.4 MG SL tablet  Commonly known as:  NITROSTAT     ondansetron ODT 4 MG disintegrating tablet  Commonly  known as:  ZOFRAN-ODT  Take 1 tablet by mouth Every 6 (Six) Hours As Needed for Nausea or   Vomiting.     pantoprazole 40 MG EC tablet  Commonly known as:  PROTONIX     psyllium 58.6 % packet  Commonly known as:  METAMUCIL     rosuvastatin 40 MG tablet  Commonly known as:  CRESTOR     triamterene-hydrochlorothiazide 75-50 MG per tablet  Commonly known as:  MAXZIDE     urea 40 % ointment  Commonly known as:  CARMOL     VICTOZA SC     ZOFRAN 8 MG tablet  Generic drug:  ondansetron  Take 1 tablet by mouth Every 8 (Eight) Hours As Needed for Nausea or   Vomiting for up to 10 doses.         * This list has 2 medication(s) that are the same as other medications   prescribed for you. Read the directions carefully, and ask your doctor or   other care provider to review them with you.              (Please note that portions of this note were completed with a voice recognition program.)    DO Nasim Go Jason Paul, DO  05/03/19 0305

## 2019-05-06 ENCOUNTER — OFFICE VISIT (OUTPATIENT)
Dept: NEUROLOGY | Facility: CLINIC | Age: 59
End: 2019-05-06

## 2019-05-06 VITALS
HEIGHT: 72 IN | DIASTOLIC BLOOD PRESSURE: 88 MMHG | BODY MASS INDEX: 33.59 KG/M2 | HEART RATE: 76 BPM | WEIGHT: 248 LBS | SYSTOLIC BLOOD PRESSURE: 138 MMHG

## 2019-05-06 DIAGNOSIS — I77.9 BILATERAL CAROTID ARTERY DISEASE, UNSPECIFIED TYPE (HCC): ICD-10-CM

## 2019-05-06 DIAGNOSIS — G40.909 SEIZURE DISORDER (HCC): ICD-10-CM

## 2019-05-06 DIAGNOSIS — I10 ESSENTIAL HYPERTENSION: ICD-10-CM

## 2019-05-06 DIAGNOSIS — I69.30 CHRONIC LEFT ARTERIAL ISCHEMIC STROKE, ICA (INTERNAL CAROTID ARTERY): Primary | ICD-10-CM

## 2019-05-06 DIAGNOSIS — G47.33 OSA ON CPAP: ICD-10-CM

## 2019-05-06 DIAGNOSIS — Z99.89 OSA ON CPAP: ICD-10-CM

## 2019-05-06 PROCEDURE — 99213 OFFICE O/P EST LOW 20 MIN: CPT | Performed by: CLINICAL NURSE SPECIALIST

## 2019-05-06 NOTE — PROGRESS NOTES
Subjective     Chief Complaint   Patient presents with   • Seizures     No sz       .  Carlos A Boyer Jr. is a 59 y.o. male  ambidextrous retiree, .  He is here today for follow up for seizure and syncopal events. He is accompanied by himself. He was last seen 3/8/19 after being hospitalized for seizure in setting of infection.  He states he has done well and denies seizure, staring spell, involuntary tremor, loss of consciousness. Last reported episode was 2/14/19.   as you recall,  2/14/19 he began to have several seizures and yolis had 11 seizures in 5 hour period. He was incontinent, confused, and imbalanced after the seizure. He did come to [facility] ED and testing done including CT head, labs and blood culture.  Temperature in the ED was 100.7.  Last reported seizure was 2/20/19. Started abx 2/14/19. Patient has been normal state on 2/14/19, no illness and no confusion, no HA or vision changes.  Picked his wife up from work and stated was not feeling well. Back to baseline on 2/18/19.  He did have EEG 3/5/19 that was read as normal. CT head 2/14/19 was unremarkable. Blood cultures showed staphylococcus schleiferi.  Patient denies missing doses of medications prior to the seizures. Patient seen in ED 5/1/19 for kidney stone.   Patient takes Keppra 500 mg 3 tablets in AM and 4 tablets in PM, lamictal 200 mg BID.      He reports compliance with CPAP. He has hx of CAD with prior heart  catheterization and per patient no intervention done because vessels too small.    Patient has complaints of  LE numbness and shooting pains in his feet and this is treated with abapentin 100 mg at HS which continues help his symptoms tremendously and wishes to continue.  Patient also has hx of thrombocytopenia and had [facility] with [facility] and follows with PCP. Also hx of chronic ischemic stroke, HTN, DM.     Seizures    This is a chronic (2012, would occur 2-4 times a month and last up to 10 mintues) problem. Episode onset:  last episode was about A2/20/19. Duration: last about 5 minutes now with medications. Associated symptoms include headaches. Pertinent negatives include no confusion, no visual disturbance, no chest pain, no nausea, no vomiting and no diarrhea. Characteristics include bladder incontinence, rhythmic jerking and loss of consciousness. Characteristics do not include bowel incontinence, bit tongue or apnea. never had LOC, would have tremoring from inside out, and would have tremor in both arms and rarely in legs. would have staring , can hear Possible causes do not include sleep deprivation. The maximum temperature recorded prior to his arrival was 101 to 101.9 F. Associated symptoms comments: fatigue.   Peripheral Neuropathy   This is a chronic problem. Episode onset: since 2016 worse since June 2018. The problem occurs every several days (4-5 times per week and mostly at night). Associated symptoms include headaches. Pertinent negatives include no arthralgias, chest pain, fatigue, fever, myalgias, nausea, vomiting or weakness. Associated symptoms comments: Shooting pains, burning sensations, numbness in toes. When barefoot feels like walking on pipo. Shooting pains can last about 1 hour and does wake from sleep. Exacerbated by: DM, dyslipidemia, obesity. He has tried nothing (A1C improved) for the symptoms.   Syncope   This is a new problem. The current episode started in the past 7 days. Episode frequency: at least 3 times since 1/3/17. The problem has been resolved. He lost consciousness for a period of 1 to 5 minutes (no more than 3 minutes). Exacerbated by: severe forceful cough. Associated symptoms include back pain, bladder incontinence and headaches. Pertinent negatives include no bowel incontinence, chest pain, confusion, dizziness, fever, nausea, vomiting or weakness. He has tried nothing for the symptoms. The treatment provided no relief. His past medical history is significant for HTN and seizures. leslye  and has not used CPAP because of nasal surgery.        Current Outpatient Medications   Medication Sig Dispense Refill   • albuterol (PROVENTIL HFA;VENTOLIN HFA) 108 (90 BASE) MCG/ACT inhaler Inhale 2 puffs Every 6 (Six) Hours As Needed for wheezing.     • aspirin 81 MG chewable tablet Chew 81 mg Daily.     • carboxymethylcellulose (REFRESH PLUS) 0.5 % solution Administer 1 drop to both eyes 4 (Four) Times a Day As Needed for Dry Eyes.     • dicyclomine (BENTYL) 20 MG tablet Take 1 tablet by mouth Every 6 (Six) Hours As Needed (abdominal pain). 8 tablet 0   • fluticasone (FLONASE) 50 MCG/ACT nasal spray 2 sprays into each nostril Daily.     • gabapentin (NEURONTIN) 100 MG capsule Take 1 capsule by mouth Every Night. 30 capsule 2   • HYDROcodone-acetaminophen (NORCO) 7.5-325 MG per tablet Take 1 tablet by mouth Every 4 (Four) Hours As Needed for Moderate Pain  for up to 40 doses. 40 tablet 0   • insulin aspart (novoLOG FLEXPEN) 100 UNIT/ML solution pen-injector sc pen Inject 30 Units under the skin into the appropriate area as directed Every Morning. 34 units  at breakfast, 30 units at lunch, 34 units hs     • Insulin Glargine (LANTUS SOLOSTAR) 100 UNIT/ML injection pen Inject  under the skin into the appropriate area as directed 2 (Two) Times a Day. 90 units @ night  60 units in morning     • isosorbide dinitrate (ISORDIL) 30 MG tablet Take 1 tablet by mouth 3 (Three) Times a Day. (Patient taking differently: Take 10 mg by mouth 3 (Three) Times a Day.) 270 tablet 3   • lamoTRIgine (LaMICtal) 200 MG tablet Take 1 tablet by mouth 2 (Two) Times a Day. 180 tablet 1   • levETIRAcetam (KEPPRA) 500 MG tablet Take 3 tablets by mouth Daily. 270 tablet 1   • levETIRAcetam (KEPPRA) 500 MG tablet Take 4 tablets by mouth Every Night. 360 tablet 1   • Liraglutide (VICTOZA SC) Inject 1.2 Units under the skin into the appropriate area as directed Every Night.     • lisinopril (PRINIVIL,ZESTRIL) 40 MG tablet Take 20 mg by mouth  Every Night.     • metFORMIN (GLUCOPHAGE) 1000 MG tablet Take 1 tablet by mouth 2 (Two) Times a Day With Meals. HOLD x 48 hours post contrast. May resume 3/18/18     • metoprolol tartrate (LOPRESSOR) 100 MG tablet Take 50 mg by mouth Every 12 (Twelve) Hours.     • Multiple Vitamin (MULTI VITAMIN PO) Take 1 tablet by mouth Daily.     • naproxen sodium (ALEVE) 220 MG tablet Take 220 mg by mouth Daily As Needed for Headache.     • nitroglycerin (NITROSTAT) 0.4 MG SL tablet Place 0.4 mg under the tongue Every 5 (Five) Minutes As Needed for chest pain. Take no more than 3 doses in 15 minutes.     • ondansetron ODT (ZOFRAN-ODT) 4 MG disintegrating tablet Take 1 tablet by mouth Every 6 (Six) Hours As Needed for Nausea or Vomiting. 8 tablet 0   • pantoprazole (PROTONIX) 40 MG EC tablet Take 40 mg by mouth 2 (Two) Times a Day.     • psyllium (METAMUCIL) 58.6 % packet Take 1 packet by mouth Daily As Needed (constipation).     • rosuvastatin (CRESTOR) 40 MG tablet Take 20 mg by mouth Every Night.     • triamterene-hydrochlorothiazide (MAXZIDE) 75-50 MG per tablet Take 0.5 tablets by mouth Daily.     • urea (CARMOL) 40 % ointment Apply 1 application topically Daily As Needed for Dry Skin. Apply to heels.     • ZOFRAN 8 MG tablet Take 1 tablet by mouth Every 8 (Eight) Hours As Needed for Nausea or Vomiting for up to 10 doses. 10 tablet 1     No current facility-administered medications for this visit.        Past Medical History:   Diagnosis Date   • Arthritis    • Asthma    • Carotid disease, bilateral (CMS/HCC)    • Chest pain    • Chronic ischemic heart disease    • Chronic sinusitis    • Maribell bullosa    • Coronary artery disease    • Deviated septum    • Diabetes mellitus (CMS/HCC)    • Difficulty urinating    • Diverticulitis    • Enlarged prostate    • Fatty liver    • GERD (gastroesophageal reflux disease)    • Hyperlipidemia    • Hypertension    • Hypertrophy of nasal turbinates    • Keratoderma    • Kidney stone    •  Migraine    • Murmur, heart    • Myocardial infarction (CMS/HCC)    • Obesity    • PONV (postoperative nausea and vomiting)    • Psoriasis    • Seizures (CMS/HCC)    • Sinus congestion    • Sleep apnea    • SOB (shortness of breath)    • Stroke (CMS/HCC)    • UTI (urinary tract infection)        Past Surgical History:   Procedure Laterality Date   • CARDIAC CATHETERIZATION  01/2016    Dr. Broadbent; widely patent previously placed stents in the left anterior descending and obstructive disease involving the diagonal branch which was treated medically   • CARDIAC CATHETERIZATION N/A 7/14/2017    Procedure: Left Heart Cath;  Surgeon: Wade Ramey MD;  Location: [facility] CATH INVASIVE LOCATION;  Service:    • CARDIAC CATHETERIZATION Left 10/15/2018    Procedure: Cardiac Catheterization/Vascular Study;  Surgeon: Wade Ramey MD;  Location:  [facility] CATH INVASIVE LOCATION;  Service: Cardiology   • CARDIAC CATHETERIZATION  10/15/2018    Procedure: Functional Flow [place];  Surgeon: Wade Ramey MD;  Location: [facility] CATH INVASIVE LOCATION;  Service: Cardiology   • CARDIAC CATHETERIZATION N/A 10/15/2018    Procedure: Left ventriculography;  Surgeon: Wade Ramey MD;  Location: [facility] CATH INVASIVE LOCATION;  Service: Cardiology   • CHOLECYSTECTOMY WITH INTRAOPERATIVE CHOLANGIOGRAM N/A 8/1/2018    Procedure: CHOLECYSTECTOMY LAPAROSCOPIC INTRAOPERATIVE CHOLANGIOGRAM;  Surgeon: Shane Ann MD;  Location: [facility] OR;  Service: General   • CORONARY ANGIOPLASTY     • CORONARY STENT PLACEMENT      x 6   • ENDOSCOPIC FUNCTIONAL SINUS SURGERY (FESS) Bilateral 12/13/2017    Procedure: PROCEDURE PERFORMED:  Bilateral functional endoscopic anterior ethmoidectomy with bilateral middle meatal antrostomy Septoplasty Right kathia bullosa resection Bilateral inferior turbinate reduction via Coblation;  Surgeon: Mayank Ibarra MD;  Location: [facility] OR;  Service:    • ENDOSCOPY N/A 7/30/2018    Procedure: ESOPHAGOGASTRODUODENOSCOPY  "WITH ANESTHESIA;  Surgeon: Benitez Mas MD;  Location: [facility] ENDOSCOPY;  Service: Gastroenterology   • HERNIA REPAIR      x2 inguinal area   • KIDNEY STONE SURGERY     • KNEE SURGERY Right    • OTHER SURGICAL HISTORY      urolift   • PROSTATE SURGERY      Dr. Badillo -    • THUMB AMPUTATION Left     partial   • TOE AMPUTATION Right     big       family history includes Heart disease in his father; No Known Problems in his mother.    Social History     Tobacco Use   • Smoking status: Former Smoker     Years: 4.50     Types: Cigarettes     Last attempt to quit:      Years since quittin.3   • Smokeless tobacco: Former User     Types: Chew     Quit date:    Substance Use Topics   • Alcohol use: No     Comment: quit    • Drug use: No       Review of Systems   Constitutional: Negative.  Negative for fatigue and fever.   HENT: Positive for sinus pressure. Negative for hearing loss, postnasal drip and trouble swallowing.    Eyes: Negative.  Negative for visual disturbance.   Respiratory: Negative.  Negative for apnea, chest tightness and shortness of breath.    Cardiovascular: Positive for syncope. Negative for chest pain.   Gastrointestinal: Negative.  Negative for bowel incontinence, constipation, diarrhea, nausea and vomiting.   Endocrine: Negative.         Denies night sweats   Genitourinary: Positive for bladder incontinence. Negative for dysuria and frequency.   Musculoskeletal: Positive for back pain. Negative for arthralgias, gait problem and myalgias.   Skin: Negative.    Allergic/Immunologic: Negative.    Neurological: Positive for seizures, loss of consciousness, syncope and headaches. Negative for dizziness, tremors and weakness.   Hematological: Negative.  Negative for adenopathy.   Psychiatric/Behavioral: Negative.  Negative for agitation, confusion and hallucinations.   All other systems reviewed and are negative.      Objective     /88   Pulse 76   Ht 182.9 cm (72\")   Wt 112 " kg (248 lb)   BMI 33.63 kg/m² , Body mass index is 33.63 kg/m².    Physical Exam   Constitutional: He is oriented to person, place, and time. Vital signs are normal. He appears well-developed and well-nourished. He is cooperative.   HENT:   Head: Normocephalic.   Right Ear: Hearing and external ear normal.   Left Ear: Hearing and external ear normal.   Nose: Nose normal.   Mouth/Throat: Oropharynx is clear and moist.   Eyes: Conjunctivae, EOM and lids are normal. Pupils are equal, round, and reactive to light. Right eye exhibits normal extraocular motion and no nystagmus. Left eye exhibits normal extraocular motion and no nystagmus. Right pupil is round and reactive. Left pupil is round and reactive. Pupils are equal.   Neck: Normal range of motion. Neck supple. Carotid bruit is not present.   Cardiovascular: Normal rate, regular rhythm and normal heart sounds.   No murmur heard.  Pulmonary/Chest: Effort normal and breath sounds normal. He has no decreased breath sounds. He has no rhonchi.   Abdominal: Soft. Bowel sounds are normal.   Musculoskeletal: Normal range of motion.       Neurological Sensory Findings -  Altered sharp/dull right ankle/foot discrimination (decrease pin/vibration distally to the ankle) and altered sharp/dull left ankle/foot discrimination (decrease pin/vibration distally to the ankle).  Neurological: He is alert and oriented to person, place, and time. He has normal strength and normal reflexes. He displays no tremor. No cranial nerve deficit or sensory deficit. He exhibits normal muscle tone. Coordination (patient has tendency to sway standing with eyes closed., no ataxia, FTN, HTS intact bilateral) abnormal. Gait normal.   Reflex Scores:       Tricep reflexes are 2+ on the right side and 2+ on the left side.       Bicep reflexes are 2+ on the right side and 2+ on the left side.       Brachioradialis reflexes are 2+ on the right side and 2+ on the left side.       Patellar reflexes are 2+  on the right side and 2+ on the left side.       Achilles reflexes are 2+ on the right side and 2+ on the left side.  Awake, alert. No aphasia, no dysarthria  Completes simple and complex commands    CN II:  Visual fields full.  Pupils equally reactive to light  CN III, IV, VI:  Extraocular Muscles full with no signs of nystagmus  CN V:  Facial sensory is symmetric with no asymetries.  CN VII:  Facial motor symmetric  CN VIII:  Gross hearing intact bilaterally  CN IX:  Palate elevates symmetrically  CN X:  Palate elevates symmetrically  CN XI:  Shoulder shrug symmetric  CN XII:  Tongue is midline on protrusion    Full and symmetric strength bilateral upper and lower extremities.  Patient sways when testing for Romberg-chronic   Skin: Skin is warm and dry.   Psychiatric: He has a normal mood and affect. His speech is normal and behavior is normal. Cognition and memory are normal.   Nursing note and vitals reviewed.      Results for orders placed or performed during the hospital encounter of 05/01/19   Comprehensive Metabolic Panel   Result Value Ref Range    Glucose 203 (H) 70 - 100 mg/dL    BUN 17 5 - 21 mg/dL    Creatinine 1.16 0.50 - 1.40 mg/dL    Sodium 139 135 - 145 mmol/L    Potassium 4.4 3.5 - 5.3 mmol/L    Chloride 106 98 - 110 mmol/L    CO2 24.0 24.0 - 31.0 mmol/L    Calcium 9.4 8.4 - 10.4 mg/dL    Total Protein 7.2 6.3 - 8.7 g/dL    Albumin 4.30 3.50 - 5.00 g/dL    ALT (SGPT) 21 0 - 54 U/L    AST (SGOT) 27 7 - 45 U/L    Alkaline Phosphatase 105 24 - 120 U/L    Total Bilirubin 0.7 0.1 - 1.0 mg/dL    eGFR Non African Amer 64 >60 mL/min/1.73    Globulin 2.9 gm/dL    A/G Ratio 1.5 1.1 - 2.5 g/dL    BUN/Creatinine Ratio 14.7 7.0 - 25.0    Anion Gap 9.0 4.0 - 13.0 mmol/L   Urinalysis With Culture If Indicated - Urine, Clean Catch   Result Value Ref Range    Color, UA Yellow Yellow, Straw    Appearance, UA Clear Clear    pH, UA 6.5 5.0 - 8.0    Specific Gravity, UA 1.028 1.005 - 1.030    Glucose, UA >=1000 mg/dL  (3+) (A) Negative    Ketones, UA Negative Negative    Bilirubin, UA Negative Negative    Blood, UA Negative Negative    Protein, UA Negative Negative    Leuk Esterase, UA Negative Negative    Nitrite, UA Negative Negative    Urobilinogen, UA 1.0 E.U./dL 0.2 - 1.0 E.U./dL   CBC Auto Differential   Result Value Ref Range    WBC 5.01 4.80 - 10.80 10*3/mm3    RBC 4.26 (L) 4.80 - 5.90 10*6/mm3    Hemoglobin 12.8 (L) 14.0 - 18.0 g/dL    Hematocrit 36.7 (L) 40.0 - 52.0 %    MCV 86.2 82.0 - 95.0 fL    MCH 30.0 28.0 - 32.0 pg    MCHC 34.9 33.0 - 36.0 g/dL    RDW 13.0 12.0 - 15.0 %    RDW-SD 39.9 (L) 40.0 - 54.0 fl    MPV 11.1 6.0 - 12.0 fL    Platelets 124 (L) 130 - 400 10*3/mm3    Neutrophil % 44.9 39.0 - 78.0 %    Lymphocyte % 41.7 15.0 - 45.0 %    Monocyte % 8.2 4.0 - 12.0 %    Eosinophil % 4.2 (H) 0.0 - 4.0 %    Basophil % 0.6 0.0 - 2.0 %    Immature Grans % 0.4 0.0 - 5.0 %    Neutrophils, Absolute 2.25 1.87 - 8.40 10*3/mm3    Lymphocytes, Absolute 2.09 0.72 - 4.86 10*3/mm3    Monocytes, Absolute 0.41 0.19 - 1.30 10*3/mm3    Eosinophils, Absolute 0.21 0.00 - 0.70 10*3/mm3    Basophils, Absolute 0.03 0.00 - 0.20 10*3/mm3    Immature Grans, Absolute 0.02 0.00 - 0.05 10*3/mm3    nRBC 0.0 0.0 - 0.2 /100 WBC        ASSESSMENT/PLAN    Diagnoses and all orders for this visit:    Chronic left arterial ischemic stroke, ICA (internal carotid artery)    HOA on CPAP    Seizure disorder (CMS/HCC)    Essential hypertension    Bilateral carotid artery disease, unspecified type (CMS/[facility])    MEDICAL DECISION MAKIN. Continue with CPAP   2.  Continue with Keppra 1500 mg in AM and 2000 mg in PM  3. Continue with lamictal 200 mg BID per PCP  4.  bp managed by PCP. Systolic goal less than 140.   5. Blood glucose managed by PCP and A1C goal less than 7  6. Statin per PCP for LDL goal less than 70.  7. Continue with ASA for secondary stroke prevention  8. Patient is counseled on stroke signs and symptoms using FAST and Time Saved is  Brain Saved.  9. Patient to follow up with PCP for thrombocytopenia  10.Seizure precautions were discussed to include no tub baths, no swimming, avoiding lack of sleep, and avoiding known triggers. Education given of things that may contribute to a seizure to include, but not limited to: stressful situations, fever, fatigue, lack of sleep, low blood sugar, hyperventilation, flashing lights, and caffeine. Instructions given to take seizure medications as prescribed. Education given to family member on what to do during a seizure and care following the seizure. Education given to contact this office prior to stopping or changing any medications.  11. cONTINUE gabapentin 100 mg at HS and counseled on side effects  12. Patient's Body mass index is 33.63 kg/m². BMI is above normal parameters. Recommendations include: educational material, none (medical contraindication) and nutrition counseling.  13. I advised Carlos A of the risks of continuing to use tobacco, and I provided him with tobacco cessation educational materials in the After Visit Summary.     During this visit, I spent 3-10 minutes counseling the patient regarding tobacco cessation.  14. Patient cleared to drive 5/20/19 if remains seizure free.     HPI and ROS reviewed and updated.          allergies and all known medications/prescriptions have been reviewed using resources available on this encounter.    Return in about 6 months (around 11/6/2019).        TEENA Win

## 2019-05-06 NOTE — PATIENT INSTRUCTIONS
Epilepsy  Epilepsy is a condition in which a person has repeated seizures over time. A seizure is a sudden burst of abnormal electrical and chemical activity in the brain. Seizures can cause a change in attention, behavior, or the ability to remain awake and alert (altered mental status).  Epilepsy increases a person's risk of falls, accidents, and injury. It can also lead to complications, including:  · Depression.  · Poor memory.  · Sudden unexplained death in epilepsy (SUDEP). This complication is rare, and its cause is not known.    Most people with epilepsy lead normal lives.  What are the causes?  This condition may be caused by:  · A head injury.  · An injury that happens at birth.  · A high fever during childhood.  · A stroke.  · Bleeding that goes into or around the brain.  · Certain medicines and drugs.  · Having too little oxygen for a long period of time.  · Abnormal brain development.  · Certain infections, such as meningitis and encephalitis.  · Brain tumors.  · Conditions that are passed along from parent to child (are hereditary).    What are the signs or symptoms?  Symptoms of a seizure vary greatly from person to person. They include:  · Convulsions.  · Stiffening of the body.  · Involuntary movements of the arms or legs.  · Loss of consciousness.  · Breathing problems.  · Falling suddenly.  · Confusion.  · Head nodding.  · Eye blinking or fluttering.  · Lip smacking.  · Drooling.  · Rapid eye movements.  · Grunting.  · Loss of bladder control and bowel control.  · Staring.  · Unresponsiveness.    Some people have symptoms right before a seizure happens (aura) and right after a seizure happens. Symptoms of an aura include:  · Fear or anxiety.  · Nausea.  · Feeling like the room is spinning (vertigo).  · A feeling of having seen or heard something before (crystal vu).  · Odd tastes or smells.  · Changes in vision, such as seeing flashing lights or spots.    Symptoms that follow a seizure  include:  · Confusion.  · Sleepiness.  · Headache.    How is this diagnosed?  This condition is diagnosed based on:  · Your symptoms.  · Your medical history.  · A physical exam.  · A neurological exam. A neurological exam is similar to a physical exam. It involves checking your strength, reflexes, coordination, and sensations.  · Tests, such as:  ? An electroencephalogram (EEG). This is a painless test that creates a diagram of your brain waves.  ? An MRI of the brain.  ? A CT scan of the brain.  ? A lumbar puncture, also called a spinal tap.  ? Blood tests to check for signs of infection or abnormal blood chemistry.    How is this treated?  There is no cure for this condition, but treatment can help control seizures. Treatment may involve:  · Taking medicines to control seizures. These include medicines to prevent seizures and medicines to stop seizures as they occur.  · Having a device called a vagus nerve stimulator implanted in the chest. The device sends electrical impulses to the vagus nerve and to the brain to prevent seizures. This treatment may be recommended if medicines do not help.  · Brain surgery. There are several kinds of surgeries that may be done to stop seizures from happening or to reduce how often seizures happen.  · Having regular blood tests. You may need to have blood tests regularly to check that you are getting the right amount of medicine.    Once this condition has been diagnosed, it is important to begin treatment as soon as possible. For some people, epilepsy eventually goes away.  Follow these instructions at home:  Medicines    · Take over-the-counter and prescription medicines only as told by your health care provider.  · Avoid any substances that may prevent your medicine from working properly, such as alcohol.  Activity  · Get enough rest. Lack of sleep can make seizures more likely to occur.  · Follow instructions from your health care provider about driving, swimming, and doing  any other activities that would be dangerous if you had a seizure.  Educating others  Teach friends and family what to do if you have a seizure. They should:  · Lay you on the ground to prevent a fall.  · Cushion your head and body.  · Loosen any tight clothing around your neck.  · Turn you on your side. If vomiting occurs, this helps keep your airway clear.  · Stay with you until you recover.  · Not hold you down. Holding you down will not stop the seizure.  · Not put anything in your mouth.  · Know whether or not you need emergency care.    General instructions  · Avoid anything that has ever triggered a seizure for you.  · Keep a seizure diary. Record what you remember about each seizure, especially anything that might have triggered the seizure.  · Keep all follow-up visits as told by your health care provider. This is important.  Contact a health care provider if:  · Your seizure pattern changes.  · You have symptoms of infection or another illness. This might increase your risk of having a seizure.  Get help right away if:  · You have a seizure that does not stop after 5 minutes.  · You have several seizures in a row without a complete recovery in between seizures.  · You have a seizure that makes it harder to breathe.  · You have a seizure that is different from previous seizures.  · You have a seizure that leaves you unable to speak or use a part of your body.  · You did not wake up immediately after a seizure.  This information is not intended to replace advice given to you by your health care provider. Make sure you discuss any questions you have with your health care provider.  Document Released: 12/18/2006 Document Revised: 07/15/2017 Document Reviewed: 06/27/2017  [employer] Interactive Patient Education © 2019 [employer]  BMI for Adults  Body mass index (BMI) is a number that is calculated from a person's weight and height. In most adults, the number is used to find how much of an adult's weight is  made up of fat. BMI is not as accurate as a direct measure of body fat.  How is BMI calculated?  BMI is calculated by dividing weight in kilograms by height in meters squared. It can also be calculated by dividing weight in pounds by height in inches squared, then multiplying the resulting number by 703. Charts are available to help you find your BMI quickly and easily without doing this calculation.  How is BMI interpreted?  Health care professionals use BMI charts to identify whether an adult is underweight, at a normal weight, or overweight based on the following guidelines:  · Underweight: BMI less than 18.5.  · Normal weight: BMI between 18.5 and 24.9.  · Overweight: BMI between 25 and 29.9.  · Obese: BMI of 30 and above.    BMI is usually interpreted the same for males and females.  Weight includes both fat and muscle, so someone with a muscular build, such as an athlete, may have a BMI that is higher than 24.9. In cases like these, BMI may not accurately depict body fat. To determine if excess body fat is the cause of a BMI of 25 or higher, further assessments may need to be done by a health care provider.  Why is BMI a useful tool?  BMI is used to identify a possible weight problem that may be related to a medical problem or may increase the risk for medical problems. BMI can also be used to promote changes to reach a healthy weight.  This information is not intended to replace advice given to you by your health care provider. Make sure you discuss any questions you have with your health care provider.  Document Released: 08/29/2005 Document Revised: 04/27/2017 Document Reviewed: 05/15/2015  [employer] Interactive Patient Education © 2018 [employer]

## 2019-05-07 ENCOUNTER — TELEPHONE (OUTPATIENT)
Dept: CARDIOLOGY | Facility: CLINIC | Age: 59
End: 2019-05-07

## 2019-05-07 NOTE — TELEPHONE ENCOUNTER
PT CALLED WANTED YOU TO KNOW HE HAD SOME BLOOD WORK DONE AND HIS CALCIUM LEVELS WERE HIGH    PLEASE ADVISE

## 2019-05-30 ENCOUNTER — HOSPITAL ENCOUNTER (EMERGENCY)
Facility: HOSPITAL | Age: 59
Discharge: HOME | End: 2019-05-30
Attending: EMERGENCY MEDICINE | Admitting: EMERGENCY MEDICINE

## 2019-05-30 ENCOUNTER — APPOINTMENT (OUTPATIENT)
Dept: GENERAL RADIOLOGY | Facility: HOSPITAL | Age: 59
End: 2019-05-30

## 2019-05-30 VITALS
BODY MASS INDEX: 33.18 KG/M2 | SYSTOLIC BLOOD PRESSURE: 119 MMHG | DIASTOLIC BLOOD PRESSURE: 86 MMHG | HEIGHT: 72 IN | TEMPERATURE: 98.1 F | HEART RATE: 73 BPM | RESPIRATION RATE: 15 BRPM | WEIGHT: 245 LBS | OXYGEN SATURATION: 95 %

## 2019-05-30 DIAGNOSIS — R09.1 PLEURISY: Primary | ICD-10-CM

## 2019-05-30 DIAGNOSIS — B34.9 VIRAL SYNDROME: ICD-10-CM

## 2019-05-30 LAB
ALBUMIN SERPL-MCNC: 4.7 G/DL (ref 3.5–5)
ALBUMIN/GLOB SERPL: 1.5 G/DL (ref 1.1–2.5)
ALP SERPL-CCNC: 87 U/L (ref 24–120)
ALT SERPL W P-5'-P-CCNC: 36 U/L (ref 0–54)
ANION GAP SERPL CALCULATED.3IONS-SCNC: 11 MMOL/L (ref 4–13)
AST SERPL-CCNC: 34 U/L (ref 7–45)
BASOPHILS # BLD AUTO: 0.04 10*3/MM3 (ref 0–0.2)
BASOPHILS NFR BLD AUTO: 0.7 % (ref 0–2)
BILIRUB SERPL-MCNC: 1 MG/DL (ref 0.1–1)
BUN BLD-MCNC: 20 MG/DL (ref 5–21)
BUN/CREAT SERPL: 19.4 (ref 7–25)
CALCIUM SPEC-SCNC: 9.6 MG/DL (ref 8.4–10.4)
CHLORIDE SERPL-SCNC: 102 MMOL/L (ref 98–110)
CO2 SERPL-SCNC: 23 MMOL/L (ref 24–31)
CREAT BLD-MCNC: 1.03 MG/DL (ref 0.5–1.4)
D DIMER PPP FEU-MCNC: 0.52 MG/L (FEU) (ref 0–0.5)
DEPRECATED RDW RBC AUTO: 39.8 FL (ref 40–54)
EOSINOPHIL # BLD AUTO: 0.19 10*3/MM3 (ref 0–0.7)
EOSINOPHIL NFR BLD AUTO: 3.2 % (ref 0–4)
ERYTHROCYTE [DISTWIDTH] IN BLOOD BY AUTOMATED COUNT: 12.6 % (ref 12–15)
GFR SERPL CREATININE-BSD FRML MDRD: 73 ML/MIN/1.73
GLOBULIN UR ELPH-MCNC: 3.1 GM/DL
GLUCOSE BLD-MCNC: 177 MG/DL (ref 70–100)
GLUCOSE BLDC GLUCOMTR-MCNC: 128 MG/DL (ref 70–130)
HCT VFR BLD AUTO: 42.5 % (ref 40–52)
HGB BLD-MCNC: 14.8 G/DL (ref 14–18)
HOLD SPECIMEN: NORMAL
HOLD SPECIMEN: NORMAL
IMM GRANULOCYTES # BLD AUTO: 0.02 10*3/MM3 (ref 0–0.05)
IMM GRANULOCYTES NFR BLD AUTO: 0.3 % (ref 0–5)
LIPASE SERPL-CCNC: 48 U/L (ref 23–203)
LYMPHOCYTES # BLD AUTO: 2.1 10*3/MM3 (ref 0.72–4.86)
LYMPHOCYTES NFR BLD AUTO: 35.4 % (ref 15–45)
MAGNESIUM SERPL-MCNC: 2 MG/DL (ref 1.4–2.2)
MCH RBC QN AUTO: 30.1 PG (ref 28–32)
MCHC RBC AUTO-ENTMCNC: 34.8 G/DL (ref 33–36)
MCV RBC AUTO: 86.6 FL (ref 82–95)
MONOCYTES # BLD AUTO: 0.54 10*3/MM3 (ref 0.19–1.3)
MONOCYTES NFR BLD AUTO: 9.1 % (ref 4–12)
NEUTROPHILS # BLD AUTO: 3.05 10*3/MM3 (ref 1.87–8.4)
NEUTROPHILS NFR BLD AUTO: 51.3 % (ref 39–78)
NRBC BLD AUTO-RTO: 0 /100 WBC (ref 0–0.2)
PLATELET # BLD AUTO: 145 10*3/MM3 (ref 130–400)
PMV BLD AUTO: 11.1 FL (ref 6–12)
POTASSIUM BLD-SCNC: 4.4 MMOL/L (ref 3.5–5.3)
PROT SERPL-MCNC: 7.8 G/DL (ref 6.3–8.7)
RBC # BLD AUTO: 4.91 10*6/MM3 (ref 4.8–5.9)
SODIUM BLD-SCNC: 136 MMOL/L (ref 135–145)
TROPONIN I SERPL-MCNC: <0.012 NG/ML (ref 0–0.03)
TROPONIN I SERPL-MCNC: <0.012 NG/ML (ref 0–0.03)
WBC NRBC COR # BLD: 5.94 10*3/MM3 (ref 4.8–10.8)
WHOLE BLOOD HOLD SPECIMEN: NORMAL
WHOLE BLOOD HOLD SPECIMEN: NORMAL

## 2019-05-30 PROCEDURE — 71045 X-RAY EXAM CHEST 1 VIEW: CPT

## 2019-05-30 PROCEDURE — 85379 FIBRIN DEGRADATION QUANT: CPT | Performed by: EMERGENCY MEDICINE

## 2019-05-30 PROCEDURE — 93005 ELECTROCARDIOGRAM TRACING: CPT

## 2019-05-30 PROCEDURE — 83735 ASSAY OF MAGNESIUM: CPT | Performed by: EMERGENCY MEDICINE

## 2019-05-30 PROCEDURE — 80053 COMPREHEN METABOLIC PANEL: CPT | Performed by: EMERGENCY MEDICINE

## 2019-05-30 PROCEDURE — 93010 ELECTROCARDIOGRAM REPORT: CPT | Performed by: INTERNAL MEDICINE

## 2019-05-30 PROCEDURE — 93005 ELECTROCARDIOGRAM TRACING: CPT | Performed by: EMERGENCY MEDICINE

## 2019-05-30 PROCEDURE — 25010000002 ONDANSETRON PER 1 MG: Performed by: EMERGENCY MEDICINE

## 2019-05-30 PROCEDURE — 84484 ASSAY OF TROPONIN QUANT: CPT | Performed by: EMERGENCY MEDICINE

## 2019-05-30 PROCEDURE — 85025 COMPLETE CBC W/AUTO DIFF WBC: CPT | Performed by: EMERGENCY MEDICINE

## 2019-05-30 PROCEDURE — 25010000002 MORPHINE PER 10 MG: Performed by: EMERGENCY MEDICINE

## 2019-05-30 PROCEDURE — 96374 THER/PROPH/DIAG INJ IV PUSH: CPT

## 2019-05-30 PROCEDURE — 83690 ASSAY OF LIPASE: CPT | Performed by: EMERGENCY MEDICINE

## 2019-05-30 PROCEDURE — 96376 TX/PRO/DX INJ SAME DRUG ADON: CPT

## 2019-05-30 PROCEDURE — 36415 COLL VENOUS BLD VENIPUNCTURE: CPT

## 2019-05-30 PROCEDURE — 96375 TX/PRO/DX INJ NEW DRUG ADDON: CPT

## 2019-05-30 PROCEDURE — 82962 GLUCOSE BLOOD TEST: CPT

## 2019-05-30 PROCEDURE — 99284 EMERGENCY DEPT VISIT MOD MDM: CPT

## 2019-05-30 RX ORDER — ASPIRIN 81 MG/1
324 TABLET, CHEWABLE ORAL ONCE
Status: COMPLETED | OUTPATIENT
Start: 2019-05-30 | End: 2019-05-30

## 2019-05-30 RX ORDER — ONDANSETRON 4 MG/1
4 TABLET, ORALLY DISINTEGRATING ORAL EVERY 4 HOURS PRN
Qty: 10 TABLET | Refills: 0 | Status: SHIPPED | OUTPATIENT
Start: 2019-05-30 | End: 2021-08-05

## 2019-05-30 RX ORDER — ONDANSETRON 2 MG/ML
4 INJECTION INTRAMUSCULAR; INTRAVENOUS ONCE
Status: COMPLETED | OUTPATIENT
Start: 2019-05-30 | End: 2019-05-30

## 2019-05-30 RX ORDER — METHYLPREDNISOLONE 4 MG/1
TABLET ORAL
Qty: 1 EACH | Refills: 0 | Status: ON HOLD | OUTPATIENT
Start: 2019-05-30 | End: 2019-06-27

## 2019-05-30 RX ORDER — SODIUM CHLORIDE 0.9 % (FLUSH) 0.9 %
10 SYRINGE (ML) INJECTION AS NEEDED
Status: DISCONTINUED | OUTPATIENT
Start: 2019-05-30 | End: 2019-05-30 | Stop reason: HOSPADM

## 2019-05-30 RX ADMIN — SODIUM CHLORIDE 500 ML: 9 INJECTION, SOLUTION INTRAVENOUS at 15:37

## 2019-05-30 RX ADMIN — ONDANSETRON 4 MG: 2 SOLUTION INTRAMUSCULAR; INTRAVENOUS at 14:43

## 2019-05-30 RX ADMIN — ASPIRIN 81 MG 243 MG: 81 TABLET ORAL at 12:45

## 2019-05-30 RX ADMIN — ONDANSETRON 4 MG: 2 SOLUTION INTRAMUSCULAR; INTRAVENOUS at 15:30

## 2019-05-30 RX ADMIN — MORPHINE SULFATE 4 MG: 4 INJECTION INTRAVENOUS at 13:25

## 2019-05-30 RX ADMIN — ONDANSETRON 4 MG: 2 SOLUTION INTRAMUSCULAR; INTRAVENOUS at 13:25

## 2019-05-30 RX ADMIN — MORPHINE SULFATE 4 MG: 4 INJECTION INTRAVENOUS at 14:43

## 2019-06-07 ENCOUNTER — OFFICE VISIT (OUTPATIENT)
Dept: CARDIOLOGY | Facility: CLINIC | Age: 59
End: 2019-06-07

## 2019-06-07 VITALS
SYSTOLIC BLOOD PRESSURE: 130 MMHG | OXYGEN SATURATION: 97 % | BODY MASS INDEX: 33.48 KG/M2 | HEIGHT: 72 IN | DIASTOLIC BLOOD PRESSURE: 80 MMHG | HEART RATE: 87 BPM | WEIGHT: 247.2 LBS

## 2019-06-07 DIAGNOSIS — Z79.4 TYPE 2 DIABETES MELLITUS WITH COMPLICATION, WITH LONG-TERM CURRENT USE OF INSULIN (HCC): ICD-10-CM

## 2019-06-07 DIAGNOSIS — R07.2 PRECORDIAL CHEST PAIN: ICD-10-CM

## 2019-06-07 DIAGNOSIS — E11.8 TYPE 2 DIABETES MELLITUS WITH COMPLICATION, WITH LONG-TERM CURRENT USE OF INSULIN (HCC): ICD-10-CM

## 2019-06-07 DIAGNOSIS — I77.9 BILATERAL CAROTID ARTERY DISEASE, UNSPECIFIED TYPE (HCC): Primary | ICD-10-CM

## 2019-06-07 DIAGNOSIS — K21.9 GASTROESOPHAGEAL REFLUX DISEASE WITHOUT ESOPHAGITIS: ICD-10-CM

## 2019-06-07 DIAGNOSIS — I10 ESSENTIAL HYPERTENSION: ICD-10-CM

## 2019-06-07 PROCEDURE — 99214 OFFICE O/P EST MOD 30 MIN: CPT | Performed by: INTERNAL MEDICINE

## 2019-06-07 NOTE — PROGRESS NOTES
Carlos A Boyer Jr.  [id number]  1960  59 y.o.  male    Referring Provider: Vinayak Correia PA    Reason for Follow-up Visit: Here for routine follow up   coronary artery disease stented coronary artery   Type 2 diabetes mellitus     Subjective    Mild chronic exertional shortness of breath on exertion relieved with rest  No significant cough or wheezing  Going on for several months  No palpitations      Recent ER visit for chest pain    Chest pain at rest, moderate substernal, pressure like. Lasted for 3 hours  ER visit 5/30/19  No recurrence  Symptoms reminiscent of prior angina.    Was advised to undergo stress test  associated diaphoresis    associated moderate nausea  No radiation    Relieved spontaneously  Not positional    No change with intake of food or antacids  No change with breathing  Moderate associated dyspnea      No significant pedal edema  No fever or chills  No significant expectoration  No hemoptysis  No presyncope or syncope        History of present illness:  Carlos A Boyer Jr. is a 59 y.o. yo male with history of chest pain who presents today for   Chief Complaint   Patient presents with   • Coronary Artery Disease     6 month follow up recent er 1 month ago.    • sx clearance     [facility] only on [facility]    .    History  Past Medical History:   Diagnosis Date   • Arthritis    • Asthma    • Carotid disease, bilateral (CMS/HCC)    • Chest pain    • Chronic ischemic heart disease    • Chronic sinusitis    • Maribell bullosa    • Coronary artery disease    • Deviated septum    • Diabetes mellitus (CMS/HCC)    • Difficulty urinating    • Diverticulitis    • Enlarged prostate    • Fatty liver    • GERD (gastroesophageal reflux disease)    • Hyperlipidemia    • Hypertension    • Hypertrophy of nasal turbinates    • Keratoderma    • Kidney stone    • Migraine    • Murmur, heart    • Myocardial infarction (CMS/HCC)    • Obesity    • PONV  (postoperative nausea and vomiting)    • Psoriasis    • Seizures (CMS/HCC)    • Sinus congestion    • Sleep apnea    • SOB (shortness of breath)    • Stroke (CMS/HCC)    • UTI (urinary tract infection)    ,   Past Surgical History:   Procedure Laterality Date   • CARDIAC CATHETERIZATION  01/2016    Dr. Broadbent; widely patent previously placed stents in the left anterior descending and obstructive disease involving the diagonal branch which was treated medically   • CARDIAC CATHETERIZATION N/A 7/14/2017    Procedure: Left Heart Cath;  Surgeon: Wade Ramey MD;  Location: [facility] CATH INVASIVE LOCATION;  Service:    • CARDIAC CATHETERIZATION Left 10/15/2018    Procedure: Cardiac Catheterization/Vascular Study;  Surgeon: Wade Ramey MD;  Location: [facility] CATH INVASIVE LOCATION;  Service: Cardiology   • CARDIAC CATHETERIZATION  10/15/2018    Procedure: Functional Flow [place];  Surgeon: Wade Ramey MD;  Location: [facility] CATH INVASIVE LOCATION;  Service: Cardiology   • CARDIAC CATHETERIZATION N/A 10/15/2018    Procedure: Left ventriculography;  Surgeon: Wade Ramey MD;  Location: [facility] CATH INVASIVE LOCATION;  Service: Cardiology   • CHOLECYSTECTOMY WITH INTRAOPERATIVE CHOLANGIOGRAM N/A 8/1/2018    Procedure: CHOLECYSTECTOMY LAPAROSCOPIC INTRAOPERATIVE CHOLANGIOGRAM;  Surgeon: Shane Ann MD;  Location: [facility] OR;  Service: General   • CORONARY ANGIOPLASTY     • CORONARY STENT PLACEMENT      x 6   • ENDOSCOPIC FUNCTIONAL SINUS SURGERY (FESS) Bilateral 12/13/2017    Procedure: PROCEDURE PERFORMED:  Bilateral functional endoscopic anterior ethmoidectomy with bilateral middle meatal antrostomy Septoplasty Right kathia bullosa resection Bilateral inferior turbinate reduction via Coblation;  Surgeon: Mayank Ibarra MD;  Location: [facility] OR;  Service:    • ENDOSCOPY N/A 7/30/2018    Procedure: ESOPHAGOGASTRODUODENOSCOPY WITH ANESTHESIA;  Surgeon: Benitez Mas MD;  Location: [facility] ENDOSCOPY;  Service:  Gastroenterology   • HERNIA REPAIR      x2 inguinal area   • KIDNEY STONE SURGERY     • KNEE SURGERY Right    • OTHER SURGICAL HISTORY      urolift   • PROSTATE SURGERY      Dr. Badillo -    • THUMB AMPUTATION Left     partial   • TOE AMPUTATION Right     big   ,   Family History   Problem Relation Age of Onset   • Heart disease Father    • No Known Problems Mother    ,   Social History     Tobacco Use   • Smoking status: Former Smoker     Years: 4.50     Types: Cigarettes     Last attempt to quit:      Years since quittin.4   • Smokeless tobacco: Former User     Types: Chew     Quit date:    Substance Use Topics   • Alcohol use: No     Comment: quit    • Drug use: No   ,     Medications  Current Outpatient Medications   Medication Sig Dispense Refill   • albuterol (PROVENTIL HFA;VENTOLIN HFA) 108 (90 BASE) MCG/ACT inhaler Inhale 2 puffs Every 6 (Six) Hours As Needed for wheezing.     • aspirin 81 MG chewable tablet Chew 81 mg Daily.     • carboxymethylcellulose (REFRESH PLUS) 0.5 % solution Administer 1 drop to both eyes 4 (Four) Times a Day As Needed for Dry Eyes.     • fluticasone (FLONASE) 50 MCG/ACT nasal spray 2 sprays into each nostril Daily.     • gabapentin (NEURONTIN) 100 MG capsule Take 1 capsule by mouth Every Night. 30 capsule 2   • HYDROcodone-acetaminophen (NORCO) 7.5-325 MG per tablet Take 1 tablet by mouth Every 4 (Four) Hours As Needed for Moderate Pain  for up to 40 doses. 40 tablet 0   • insulin aspart (novoLOG FLEXPEN) 100 UNIT/ML solution pen-injector sc pen Inject 30 Units under the skin into the appropriate area as directed Every Morning. 34 units  at breakfast, 30 units at lunch, 34 units hs     • Insulin Glargine (LANTUS SOLOSTAR) 100 UNIT/ML injection pen Inject  under the skin into the appropriate area as directed 2 (Two) Times a Day. 90 units @ night  60 units in morning     • isosorbide dinitrate (ISORDIL) 30 MG tablet Take 1 tablet by mouth 3 (Three) Times a Day.  (Patient taking differently: Take 30 mg by mouth 2 (Two) Times a Day.) 270 tablet 3   • lamoTRIgine (LaMICtal) 200 MG tablet Take 1 tablet by mouth 2 (Two) Times a Day. (Patient taking differently: Take 100 mg by mouth 2 (Two) Times a Day.) 180 tablet 1   • levETIRAcetam (KEPPRA) 500 MG tablet Take 3 tablets by mouth Daily. 270 tablet 1   • levETIRAcetam (KEPPRA) 500 MG tablet Take 4 tablets by mouth Every Night. 360 tablet 1   • Liraglutide (VICTOZA SC) Inject 1.2 Units under the skin into the appropriate area as directed Every Night.     • lisinopril (PRINIVIL,ZESTRIL) 40 MG tablet Take 20 mg by mouth Every Night.     • metFORMIN (GLUCOPHAGE) 1000 MG tablet Take 1 tablet by mouth 2 (Two) Times a Day With Meals. HOLD x 48 hours post contrast. May resume 3/18/18     • methylPREDNISolone (MEDROL, ALEJANDRO,) 4 MG tablet Take as directed on package instructions. 1 each 0   • metoprolol tartrate (LOPRESSOR) 100 MG tablet Take 50 mg by mouth Every 12 (Twelve) Hours.     • Multiple Vitamin (MULTI VITAMIN PO) Take 1 tablet by mouth Daily.     • naproxen sodium (ALEVE) 220 MG tablet Take 220 mg by mouth Daily As Needed for Headache.     • nitroglycerin (NITROSTAT) 0.4 MG SL tablet Place 0.4 mg under the tongue Every 5 (Five) Minutes As Needed for chest pain. Take no more than 3 doses in 15 minutes.     • ondansetron ODT (ZOFRAN-ODT) 4 MG disintegrating tablet Take 1 tablet by mouth Every 6 (Six) Hours As Needed for Nausea or Vomiting. 8 tablet 0   • ondansetron ODT (ZOFRAN-ODT) 4 MG disintegrating tablet Take 1 tablet by mouth Every 4 (Four) Hours As Needed for Nausea or Vomiting. 10 tablet 0   • pantoprazole (PROTONIX) 40 MG EC tablet Take 40 mg by mouth 2 (Two) Times a Day.     • psyllium (METAMUCIL) 58.6 % packet Take 1 packet by mouth Daily As Needed (constipation).     • rosuvastatin (CRESTOR) 40 MG tablet Take 20 mg by mouth Every Night.     • triamterene-hydrochlorothiazide (MAXZIDE) 75-50 MG per tablet Take 0.5 tablets  "by mouth Daily.       No current facility-administered medications for this visit.        Allergies:  Flagyl [metronidazole]; Atorvastatin; and Ciprofloxacin    Review of Systems  Review of Systems   Constitution: Positive for weakness and malaise/fatigue.   HENT: Negative.    Eyes: Negative.    Cardiovascular: Positive for chest pain and dyspnea on exertion. Negative for claudication, cyanosis, irregular heartbeat, leg swelling, near-syncope, orthopnea, palpitations, paroxysmal nocturnal dyspnea and syncope.   Respiratory: Negative.    Endocrine: Negative.    Hematologic/Lymphatic: Negative.    Skin: Negative.    Musculoskeletal: Positive for arthritis and back pain.   Gastrointestinal: Negative for anorexia.   Genitourinary: Negative.    Neurological: Positive for dizziness and light-headedness.   Psychiatric/Behavioral: Negative.        Objective     Physical Exam:      Vitals:    06/07/19 1011   BP: 130/80   BP Location: Right arm   Patient Position: Sitting   Cuff Size: Adult   Pulse: 87   SpO2: 97%   Weight: 112 kg (247 lb 3.2 oz)   Height: 182.9 cm (72\")       /80 (BP Location: Right arm, Patient Position: Sitting, Cuff Size: Adult)   Pulse 87   Ht 182.9 cm (72\")   Wt 112 kg (247 lb 3.2 oz)   SpO2 97%   BMI 33.53 kg/m²   Physical Exam   Constitutional: He appears well-developed.   HENT:   Head: Normocephalic.   Neck: Normal carotid pulses and no JVD present. No tracheal tenderness present. Carotid bruit is not present. No tracheal deviation and no edema present.   Cardiovascular: Regular rhythm, normal heart sounds and normal pulses.   Pulmonary/Chest: Effort normal. No stridor.   Abdominal: Soft.   Neurological: He is alert. He has normal strength. No cranial nerve deficit or sensory deficit.   Skin: Skin is warm.   Psychiatric: He has a normal mood and affect. His speech is normal and behavior is normal.     Right groin: Arteriotomy site healthy,  No bleeding, swelling or excessive bruising. " Preserved distal pulses.  Pea sized Mynx closure site nodule noted    Results Review:  Results for orders placed during the hospital encounter of 07/28/18   Adult Stress Echo W/ Cont or Stress Agent if Necessary Per Protocol    Narrative · Left ventricular systolic function is normal. Estimated EF = 55%.  · Abnormal stress echo with echocardiographic evidence for myocardial   ischemia located in the septal wall and apical wall.      10/15/18        Conclusion     The left main coronary artery is normal  Left anterior descending coronary artery and diagonal branches are patent with a widely patent stent noted in the left anterior descent cornea artery.  Mid left anterior descending coronary artery is a 50% stenosis and hemodynamically not significant.  Left circumflex coronary artery is large with a first obtuse marginal around 40-60% stenosis.  Fractional flow reserve of this is completely normal and at above 0.9.  Left circumflex coronary artery is a codominant.  Right coronary artery is codominant large with distal small vessel disease.     Left ventricular end-diastolic pressure is normal at 11 mmHg no gradient across aortic valve on pullback  Left ventriculography shows ejection fraction of 55%.           Plan     Continue current medication  Symptomatic treatment for small vessel disease with antianginals  If stable can be discharged home later today  Intensive risk factor modifications for both primary and secondary prevention if applicable  Hydration   Observation         7/17 cath  Conclusion  Nonocclusive epicardial coronary arteries with widely patent stents in the  left anterior descending coronary artery   artery and left circumflex coronary artery.  Hyperdynamic left ventricle with ejection fraction of 75%.  LVEDP   17    mm Hg     Plan  Workup for noncardiac causes of chest pain this can be done as outpatient.  His d-dimer is within normal range  After a period of observation of stable can be  discharged either later today.  Keep follow-up appointment with me  Ongoing risk factor modifications keep LDL below 70 mg/dL  Hydration   Observation     Procedures    Assessment/Plan   Patient Active Problem List   Diagnosis   • Diarrhea due to drug   • Syncope and collapse   • Type 2 diabetes mellitus with complication (CMS/HCC)   • Gastroesophageal reflux disease without esophagitis   • Essential hypertension   • Seizure disorder (CMS/HCC)   • Carotid disease, bilateral (CMS/HCC)   • Coronary artery disease involving native coronary artery of native heart with angina pectoris (CMS/HCC)   • Mixed hyperlipidemia   • Chronic left arterial ischemic stroke, ICA (internal carotid artery)   • HOA on CPAP   • Benign non-nodular prostatic hyperplasia with lower urinary tract symptoms   • Deviated nasal septum   • Maribell bullosa   • Hypertrophy of both inferior nasal turbinates   • Chronic sinusitis of both maxillary sinuses   • S/P FESS (functional endoscopic sinus surgery)   • Diabetic complication (CMS/[facility])   • Epigastric pain   • Diabetic peripheral neuropathy (CMS/[facility])           Plan    Orders Placed This Encounter   Procedures   • Adult Stress Echo W/ Cont or Stress Agent if Necessary Per Protocol     Standing Status:   Future     Standing Expiration Date:   6/6/2020     Order Specific Question:   What stress agent will be used?     Answer:   Dobutamine     Order Specific Question:   Reason for Dobutamine?     Answer:   Unable to Exercise     Order Specific Question:   Reason for exam?     Answer:   Chest Pain      S/L NTG PRN for chest pain, call me or go to ER if has to use S/L nitroglycerin     Call for results of above cardiac testing.     ____________________________________________________________________________________________________________________________________________  Health maintenance and recommendations      Low salt/ HTN/ Heart healthy carbohydrate restricted cardiac diet   The patient is advised  to reduce or avoid caffeine or other cardiac stimulants.     The patient was advised to avoid long term NSAIDS , use Tylenol PRN instead  Avoid cardiac stimulants including common drugs like Pseudoephedrine or excessive amounts of caffeine    Monitor for any signs of bleeding including red or dark stools. Fall precautions.        Advised staying uptodate with immunizations per established standard guidelines.      Offered to give patient  a copy      Questions were encouraged, asked and answered to the patient's  understanding and satisfaction. Questions if any regarding current medications and side effects, need for refills and importance of compliance to medications stressed.    Reviewed available prior notes, consults, prior visits, laboratory findings, radiology and cardiology relevant reports. Updated chart as applicable. I have reviewed the patient's medical history in detail and updated the computerized patient record as relevant.      Updated patient regarding any new or relevant abnormalities on review of records or any new findings on physical exam. Mentioned to patient about purpose of visit and desirable health short and long term goals and objectives.    Primary to monitor CBC CMP Lipid panel and TSH as applicable    ___________________________________________________________________________________________________________________________________________              Return in about 6 weeks (around 7/19/2019).

## 2019-06-14 ENCOUNTER — HOSPITAL ENCOUNTER (OUTPATIENT)
Dept: CARDIOLOGY | Facility: HOSPITAL | Age: 59
Discharge: HOME | End: 2019-06-14
Admitting: INTERNAL MEDICINE

## 2019-06-14 VITALS — WEIGHT: 246.91 LBS | HEIGHT: 72 IN | BODY MASS INDEX: 33.44 KG/M2

## 2019-06-14 DIAGNOSIS — R07.2 PRECORDIAL CHEST PAIN: ICD-10-CM

## 2019-06-14 PROCEDURE — 25010000003 DOBUTAMINE PER 250 MG: Performed by: INTERNAL MEDICINE

## 2019-06-14 PROCEDURE — 93350 STRESS TTE ONLY: CPT | Performed by: INTERNAL MEDICINE

## 2019-06-14 PROCEDURE — 93017 CV STRESS TEST TRACING ONLY: CPT

## 2019-06-14 PROCEDURE — 93350 STRESS TTE ONLY: CPT

## 2019-06-14 PROCEDURE — 25010000002 PERFLUTREN 6.52 MG/ML SUSPENSION: Performed by: INTERNAL MEDICINE

## 2019-06-14 PROCEDURE — 93352 ADMIN ECG CONTRAST AGENT: CPT | Performed by: INTERNAL MEDICINE

## 2019-06-14 PROCEDURE — 63710000001 NITROGLYCERIN 0.4 MG SUBLINGUAL TABLET 25 EACH BOTTLE: Performed by: INTERNAL MEDICINE

## 2019-06-14 PROCEDURE — A9270 NON-COVERED ITEM OR SERVICE: HCPCS | Performed by: INTERNAL MEDICINE

## 2019-06-14 PROCEDURE — 93018 CV STRESS TEST I&R ONLY: CPT | Performed by: INTERNAL MEDICINE

## 2019-06-14 RX ORDER — DOBUTAMINE HYDROCHLORIDE 100 MG/100ML
10-50 INJECTION INTRAVENOUS CONTINUOUS
Status: DISCONTINUED | OUTPATIENT
Start: 2019-06-14 | End: 2019-06-15 | Stop reason: HOSPADM

## 2019-06-14 RX ORDER — NITROGLYCERIN 0.4 MG/1
0.4 TABLET SUBLINGUAL
Status: DISCONTINUED | OUTPATIENT
Start: 2019-06-14 | End: 2019-06-15 | Stop reason: HOSPADM

## 2019-06-14 RX ADMIN — NITROGLYCERIN 0.4 MG: 0.4 TABLET SUBLINGUAL at 14:32

## 2019-06-14 RX ADMIN — ATROPINE SULFATE 1.3 MG: 0.1 INJECTION PARENTERAL at 14:30

## 2019-06-14 RX ADMIN — Medication 10 MCG/KG/MIN: at 14:02

## 2019-06-14 RX ADMIN — PERFLUTREN 8.48 MG: 6.52 INJECTION, SUSPENSION INTRAVENOUS at 14:01

## 2019-06-15 LAB
BH CV STRESS BP STAGE 1: NORMAL
BH CV STRESS BP STAGE 2: NORMAL
BH CV STRESS BP STAGE 3: NORMAL
BH CV STRESS BP STAGE 4: NORMAL
BH CV STRESS DOB - ATROPINE STAGE 4: 1.3
BH CV STRESS DOSE DOBUTAMINE STAGE 1: 10
BH CV STRESS DOSE DOBUTAMINE STAGE 2: 20
BH CV STRESS DOSE DOBUTAMINE STAGE 3: 30
BH CV STRESS DOSE DOBUTAMINE STAGE 4: 40
BH CV STRESS DURATION MIN STAGE 1: 3
BH CV STRESS DURATION MIN STAGE 2: 3
BH CV STRESS DURATION MIN STAGE 3: 3
BH CV STRESS DURATION MIN STAGE 4: 3
BH CV STRESS DURATION SEC STAGE 1: 0
BH CV STRESS DURATION SEC STAGE 2: 0
BH CV STRESS DURATION SEC STAGE 3: 0
BH CV STRESS DURATION SEC STAGE 4: 13
BH CV STRESS ECHO POST STRESS EJECTION FRACTION EF: 65 %
BH CV STRESS HR STAGE 1: 70
BH CV STRESS HR STAGE 2: 73
BH CV STRESS HR STAGE 3: 93
BH CV STRESS HR STAGE 4: 136
BH CV STRESS PROTOCOL 1: NORMAL
BH CV STRESS RECOVERY BP: NORMAL MMHG
BH CV STRESS RECOVERY HR: 104 BPM
BH CV STRESS STAGE 1: 1
BH CV STRESS STAGE 2: 2
BH CV STRESS STAGE 3: 3
BH CV STRESS STAGE 4: 4
LV EF 2D ECHO EST: 55 %
MAXIMAL PREDICTED HEART RATE: 159 BPM
PERCENT MAX PREDICTED HR: 85.53 %
STRESS BASELINE BP: NORMAL MMHG
STRESS BASELINE HR: 75 BPM
STRESS PERCENT HR: 101 %
STRESS POST EXERCISE DUR MIN: 12 MIN
STRESS POST EXERCISE DUR SEC: 13 SEC
STRESS POST PEAK BP: NORMAL MMHG
STRESS POST PEAK HR: 136 BPM
STRESS TARGET HR: 135 BPM

## 2019-06-21 ENCOUNTER — OFFICE VISIT (OUTPATIENT)
Dept: CARDIOLOGY | Facility: CLINIC | Age: 59
End: 2019-06-21

## 2019-06-21 VITALS
HEART RATE: 75 BPM | BODY MASS INDEX: 33.86 KG/M2 | HEIGHT: 72 IN | DIASTOLIC BLOOD PRESSURE: 62 MMHG | SYSTOLIC BLOOD PRESSURE: 112 MMHG | WEIGHT: 250 LBS

## 2019-06-21 DIAGNOSIS — E11.8 TYPE 2 DIABETES MELLITUS WITH COMPLICATION, WITH LONG-TERM CURRENT USE OF INSULIN (HCC): ICD-10-CM

## 2019-06-21 DIAGNOSIS — I77.9 BILATERAL CAROTID ARTERY DISEASE, UNSPECIFIED TYPE (HCC): ICD-10-CM

## 2019-06-21 DIAGNOSIS — R94.39 ABNORMAL STRESS TEST: ICD-10-CM

## 2019-06-21 DIAGNOSIS — I69.30 CHRONIC LEFT ARTERIAL ISCHEMIC STROKE, ICA (INTERNAL CAROTID ARTERY): ICD-10-CM

## 2019-06-21 DIAGNOSIS — R07.2 PRECORDIAL PAIN: Primary | ICD-10-CM

## 2019-06-21 DIAGNOSIS — G47.33 OSA ON CPAP: ICD-10-CM

## 2019-06-21 DIAGNOSIS — I10 ESSENTIAL HYPERTENSION: ICD-10-CM

## 2019-06-21 DIAGNOSIS — Z79.4 TYPE 2 DIABETES MELLITUS WITH COMPLICATION, WITH LONG-TERM CURRENT USE OF INSULIN (HCC): ICD-10-CM

## 2019-06-21 DIAGNOSIS — I25.119 CORONARY ARTERY DISEASE INVOLVING NATIVE CORONARY ARTERY OF NATIVE HEART WITH ANGINA PECTORIS (HCC): ICD-10-CM

## 2019-06-21 DIAGNOSIS — E78.2 MIXED HYPERLIPIDEMIA: ICD-10-CM

## 2019-06-21 DIAGNOSIS — Z99.89 OSA ON CPAP: ICD-10-CM

## 2019-06-21 PROCEDURE — 99214 OFFICE O/P EST MOD 30 MIN: CPT | Performed by: INTERNAL MEDICINE

## 2019-06-21 PROCEDURE — 93000 ELECTROCARDIOGRAM COMPLETE: CPT | Performed by: INTERNAL MEDICINE

## 2019-06-21 NOTE — PROGRESS NOTES
Carlos A Boyer Jr.  [id number]  1960  59 y.o.  male    Referring Provider: Vinayak Correia PA    Reason for Follow-up Visit: Here for routine follow up   coronary artery disease stented coronary artery   Type 2 diabetes mellitus   Now with substernal chest pain for which he took s/l NTG with relief  Lasted less than 5 minutes  No nausea or diaphoresis  Went to ER and saw the stress test      Subjective    Now with recurrent chest pain   Taking S/L NTG    Abnormal cardiac stress test raising suspicion for underlying hemodynamically significant obstructive coronary artery disease .     Prior ER visit for chest pain    Chest pain at rest, moderate substernal, pressure like  No recurrence    Symptoms reminiscent of prior angina.    Was advised to undergo stress test, he repeated stress test that is abnormal  associated diaphoresis    associated moderate nausea  No radiation    Relieved spontaneously  Not positional    No change with intake of food or antacids  No change with breathing  Moderate associated dyspnea      No significant pedal edema  No fever or chills  No significant expectoration  No hemoptysis  No presyncope or syncope        History of present illness:  Carlos A Boyer Jr. is a 59 y.o. yo male with history of chest pain who presents today for   Chief Complaint   Patient presents with   • Coronary Artery Disease     1 mo - recent testing   • Chest Pain   • Pre-op Exam     possible shoulder procedure in near future (upcoming MRI)   .    History  Past Medical History:   Diagnosis Date   • Arthritis    • Asthma    • Carotid disease, bilateral (CMS/HCC)    • Chest pain    • Chronic ischemic heart disease    • Chronic sinusitis    • Maribell bullosa    • Coronary artery disease    • Deviated septum    • Diabetes mellitus (CMS/HCC)    • Difficulty urinating    • Diverticulitis    • Enlarged prostate    • Fatty liver    • GERD (gastroesophageal reflux disease)    • Hyperlipidemia    • Hypertension    •  Hypertrophy of nasal turbinates    • Keratoderma    • Kidney stone    • Migraine    • Murmur, heart    • Myocardial infarction (CMS/HCC)    • Obesity    • PONV (postoperative nausea and vomiting)    • Psoriasis    • Seizures (CMS/HCC)    • Sinus congestion    • Sleep apnea    • SOB (shortness of breath)    • Stroke (CMS/HCC)    • UTI (urinary tract infection)    ,   Past Surgical History:   Procedure Laterality Date   • CARDIAC CATHETERIZATION  01/2016    Dr. Broadbent; widely patent previously placed stents in the left anterior descending and obstructive disease involving the diagonal branch which was treated medically   • CARDIAC CATHETERIZATION N/A 7/14/2017    Procedure: Left Heart Cath;  Surgeon: Wade Ramey MD;  Location:  [facility];  Service:    • CARDIAC CATHETERIZATION Left 10/15/2018    Procedure: Cardiac Catheterization/Vascular Study;  Surgeon: Wade Ramey MD;  Location:  [facility];  Service: Cardiology   • CARDIAC CATHETERIZATION  10/15/2018    Procedure: Functional Flow Pe Ell;  Surgeon: Wade Ramey MD;  Location:  [facility];  Service: Cardiology   • CARDIAC CATHETERIZATION N/A 10/15/2018    Procedure: Left ventriculography;  Surgeon: Wade Ramey MD;  Location:  [facility];  Service: Cardiology   • CHOLECYSTECTOMY WITH INTRAOPERATIVE CHOLANGIOGRAM N/A 8/1/2018    Procedure: CHOLECYSTECTOMY LAPAROSCOPIC INTRAOPERATIVE CHOLANGIOGRAM;  Surgeon: Shane Ann MD;  Location: [facility] OR;  Service: General   • CORONARY ANGIOPLASTY     • CORONARY STENT PLACEMENT      x 6   • ENDOSCOPIC FUNCTIONAL SINUS SURGERY (FESS) Bilateral 12/13/2017    Procedure: PROCEDURE PERFORMED:  Bilateral functional endoscopic anterior ethmoidectomy with bilateral middle meatal antrostomy Septoplasty Right kathia bullosa resection Bilateral inferior turbinate reduction via Coblation;  Surgeon: Mayakn Ibarra MD;  Location: [facility] OR;  Service:     • ENDOSCOPY N/A 2018    Procedure: ESOPHAGOGASTRODUODENOSCOPY WITH ANESTHESIA;  Surgeon: Benitez Mas MD;  Location: [facility] ENDOSCOPY;  Service: Gastroenterology   • HERNIA REPAIR      x2 inguinal area   • KIDNEY STONE SURGERY     • KNEE SURGERY Right    • OTHER SURGICAL HISTORY      urolift   • PROSTATE SURGERY      Dr. Badillo -    • THUMB AMPUTATION Left     partial   • TOE AMPUTATION Right     big   ,   Family History   Problem Relation Age of Onset   • Heart disease Father    • No Known Problems Mother    ,   Social History     Tobacco Use   • Smoking status: Former Smoker     Years: 4.50     Types: Cigarettes     Last attempt to quit:      Years since quittin.4   • Smokeless tobacco: Former User     Types: Chew     Quit date:    Substance Use Topics   • Alcohol use: No   • Drug use: No   ,     Medications  Current Outpatient Medications   Medication Sig Dispense Refill   • albuterol (PROVENTIL HFA;VENTOLIN HFA) 108 (90 BASE) MCG/ACT inhaler Inhale 2 puffs Every 6 (Six) Hours As Needed for wheezing.     • aspirin 81 MG chewable tablet Chew 81 mg Daily.     • carboxymethylcellulose (REFRESH PLUS) 0.5 % solution Administer 1 drop to both eyes 4 (Four) Times a Day As Needed for Dry Eyes.     • fluticasone (FLONASE) 50 MCG/ACT nasal spray 2 sprays into each nostril Daily.     • gabapentin (NEURONTIN) 100 MG capsule Take 1 capsule by mouth Every Night. 30 capsule 2   • HYDROcodone-acetaminophen (NORCO) 7.5-325 MG per tablet Take 1 tablet by mouth Every 4 (Four) Hours As Needed for Moderate Pain  for up to 40 doses. 40 tablet 0   • insulin aspart (novoLOG FLEXPEN) 100 UNIT/ML solution pen-injector sc pen Inject 30 Units under the skin into the appropriate area as directed Every Morning. 34 units  at breakfast, 30 units at lunch, 34 units hs     • Insulin Glargine (LANTUS SOLOSTAR) 100 UNIT/ML injection pen Inject  under the skin into the appropriate area as directed 2 (Two) Times a Day. 90  units @ night  60 units in morning     • isosorbide dinitrate (ISORDIL) 30 MG tablet Take 1 tablet by mouth 3 (Three) Times a Day. (Patient taking differently: Take 30 mg by mouth 2 (Two) Times a Day.) 270 tablet 3   • lamoTRIgine (LaMICtal) 200 MG tablet Take 1 tablet by mouth 2 (Two) Times a Day. (Patient taking differently: Take 100 mg by mouth 2 (Two) Times a Day.) 180 tablet 1   • levETIRAcetam (KEPPRA) 500 MG tablet Take 3 tablets by mouth Daily. 270 tablet 1   • levETIRAcetam (KEPPRA) 500 MG tablet Take 4 tablets by mouth Every Night. 360 tablet 1   • Liraglutide (VICTOZA SC) Inject  under the skin into the appropriate area as directed Every Night.     • lisinopril (PRINIVIL,ZESTRIL) 40 MG tablet Take 20 mg by mouth Every Night.     • metFORMIN (GLUCOPHAGE) 1000 MG tablet Take 1 tablet by mouth 2 (Two) Times a Day With Meals. HOLD x 48 hours post contrast. May resume 3/18/18     • methylPREDNISolone (MEDROL, ALEJANDRO,) 4 MG tablet Take as directed on package instructions. 1 each 0   • metoprolol tartrate (LOPRESSOR) 100 MG tablet Take 50 mg by mouth Every 12 (Twelve) Hours.     • Multiple Vitamin (MULTI VITAMIN PO) Take 1 tablet by mouth Daily.     • naproxen sodium (ALEVE) 220 MG tablet Take 220 mg by mouth Daily As Needed for Headache.     • nitroglycerin (NITROSTAT) 0.4 MG SL tablet Place 0.4 mg under the tongue Every 5 (Five) Minutes As Needed for chest pain. Take no more than 3 doses in 15 minutes.     • ondansetron ODT (ZOFRAN-ODT) 4 MG disintegrating tablet Take 1 tablet by mouth Every 6 (Six) Hours As Needed for Nausea or Vomiting. 8 tablet 0   • ondansetron ODT (ZOFRAN-ODT) 4 MG disintegrating tablet Take 1 tablet by mouth Every 4 (Four) Hours As Needed for Nausea or Vomiting. 10 tablet 0   • pantoprazole (PROTONIX) 40 MG EC tablet Take 40 mg by mouth 2 (Two) Times a Day.     • psyllium (METAMUCIL) 58.6 % packet Take 1 packet by mouth Daily As Needed (constipation).     • rosuvastatin (CRESTOR) 40 MG  "tablet Take 20 mg by mouth Every Night.     • triamterene-hydrochlorothiazide (MAXZIDE) 75-50 MG per tablet Take 0.5 tablets by mouth Daily.       No current facility-administered medications for this visit.        Allergies:  Flagyl [metronidazole]; Atorvastatin; and Ciprofloxacin    Review of Systems  Review of Systems   Constitution: Positive for weakness and malaise/fatigue.   HENT: Negative.    Eyes: Negative.    Cardiovascular: Positive for chest pain and dyspnea on exertion. Negative for claudication, cyanosis, irregular heartbeat, leg swelling, near-syncope, orthopnea, palpitations, paroxysmal nocturnal dyspnea and syncope.   Respiratory: Negative.    Endocrine: Negative.    Hematologic/Lymphatic: Negative.    Skin: Negative.    Musculoskeletal: Positive for arthritis and back pain.   Gastrointestinal: Negative for anorexia.   Genitourinary: Negative.    Neurological: Positive for dizziness and light-headedness.   Psychiatric/Behavioral: Negative.        Objective     Physical Exam:      Vitals:    06/21/19 1008   BP: 112/62   Pulse: 75   Weight: 113 kg (250 lb)   Height: 182.9 cm (72\")       /62   Pulse 75   Ht 182.9 cm (72\")   Wt 113 kg (250 lb)   BMI 33.91 kg/m²   Physical Exam   Constitutional: He appears well-developed.   HENT:   Head: Normocephalic.   Neck: Normal carotid pulses and no JVD present. No tracheal tenderness present. Carotid bruit is not present. No tracheal deviation and no edema present.   Cardiovascular: Regular rhythm, normal heart sounds and normal pulses.   Pulmonary/Chest: Effort normal. No stridor.   Abdominal: Soft.   Neurological: He is alert. He has normal strength. No cranial nerve deficit or sensory deficit.   Skin: Skin is warm.   Psychiatric: He has a normal mood and affect. His speech is normal and behavior is normal.     Right groin: Arteriotomy site healthy,  No bleeding, swelling or excessive bruising. Preserved distal pulses.  Pea sized Mynx closure site " nodule noted    Results Review:  Results for orders placed during the hospital encounter of 06/14/19   Adult Stress Echo W/ Cont or Stress Agent if Necessary Per Protocol    Narrative · Estimated EF = 55%.  · Left ventricular systolic function is normal.  · Patient with c/o chest pain 6/10 with stress. Resolved with Nitro and   rest.  · Abnormal stress echo with echocardiographic evidence for myocardial   ischemia located in the septal wall and apical wall.      10/15/18        Conclusion     The left main coronary artery is normal  Left anterior descending coronary artery and diagonal branches are patent with a widely patent stent noted in the  left anterior descending coronary artery    Mid left anterior descending coronary artery is a 50% stenosis and hemodynamically not significant.  Left circumflex coronary artery is large with a first obtuse marginal around 40-60% stenosis.  Fractional flow reserve of this is completely normal and at above 0.9.  Left circumflex coronary artery is a codominant.  Right coronary artery is codominant large with distal small vessel disease.     Left ventricular end-diastolic pressure is normal at 11 mmHg no gradient across aortic valve on pullback  Left ventriculography shows ejection fraction of 55%.           Plan     Continue current medication  Symptomatic treatment for small vessel disease with antianginals  If stable can be discharged home later today  Intensive risk factor modifications for both primary and secondary prevention if applicable  Hydration   Observation         7/17 cath  Conclusion  Nonocclusive epicardial coronary arteries with widely patent stents in the  left anterior descending coronary artery   artery and left circumflex coronary artery.  Hyperdynamic left ventricle with ejection fraction of 75%.  LVEDP   17    mm Hg     Plan  Workup for noncardiac causes of chest pain this can be done as outpatient.  His d-dimer is within normal range  After a period of  observation of stable can be discharged either later today.  Keep follow-up appointment with me  Ongoing risk factor modifications keep LDL below 70 mg/dL  Hydration   Observation       ECG 12 Lead  Date/Time: 6/21/2019 10:43 AM  Performed by: Wade Ramey MD  Authorized by: Wade Ramey MD   Comparison: compared with previous ECG from 5/30/2019  Similar to previous ECG  Rhythm: sinus rhythm  Rate: normal  Conduction: 1st degree AV block  Q waves: V1, V2 and V3    QRS axis: normal    Clinical impression: abnormal EKG            Assessment/Plan   Patient Active Problem List   Diagnosis   • Diarrhea due to drug   • Syncope and collapse   • Type 2 diabetes mellitus with complication (CMS/HCC)   • Gastroesophageal reflux disease without esophagitis   • Essential hypertension   • Seizure disorder (CMS/HCC)   • Carotid disease, bilateral (CMS/HCC)   • Coronary artery disease involving native coronary artery of native heart with angina pectoris (CMS/HCC)   • Mixed hyperlipidemia   • Chronic left arterial ischemic stroke, ICA (internal carotid artery)   • HOA on CPAP   • Benign non-nodular prostatic hyperplasia with lower urinary tract symptoms   • Deviated nasal septum   • Maribell bullosa   • Hypertrophy of both inferior nasal turbinates   • Chronic sinusitis of both maxillary sinuses   • S/P FESS (functional endoscopic sinus surgery)   • Diabetic complication (CMS/HCC)   • Epigastric pain   • Diabetic peripheral neuropathy (CMS/HCC)   • Abnormal stress test           Plan       S/L NTG PRN for chest pain, call me or go to ER if has to use S/L nitroglycerin     Call for results of above cardiac testing.     Recommend cardiac catheterization, selective coronary angiography, left ventriculography and percutaneous coronary intervention with application of arteriotomy hemostatic closure device.    I discussed cardiac catheterization, the procedure, risks (including bleeding, infection, vascular damage [including minor oozing,  bruising, bleeding, and up to and including but not limited to the need for vascular surgery, emergency cardiothoracic surgery, contrast reaction, renal failure, respiratory failure, heart attack, stroke, arrhythmia and even death), benefits, and alternatives and the patient has voiced understanding and is willing to proceed.    Adequate pre-hydration and post cardiac catheterization hydration.  Premedications as required and indicated for cardiac catheterization.    No contraindication to drug eluting stent placement if required  Further recommendations pending results of cardiac catheterization      ____________________________________________________________________________________________________________________________________________  Health maintenance and recommendations      Similar recommendations as last visit     Offered to give patient  a copy      Questions were encouraged, asked and answered to the patient's  understanding and satisfaction. Questions if any regarding current medications and side effects, need for refills and importance of compliance to medications stressed.    Reviewed available prior notes, consults, prior visits, laboratory findings, radiology and cardiology relevant reports. Updated chart as applicable. I have reviewed the patient's medical history in detail and updated the computerized patient record as relevant.      Updated patient regarding any new or relevant abnormalities on review of records or any new findings on physical exam. Mentioned to patient about purpose of visit and desirable health short and long term goals and objectives.    Primary to monitor CBC CMP Lipid panel and TSH as applicable    ___________________________________________________________________________________________________________________________________________              Return in about 3 months (around 9/21/2019).

## 2019-06-26 ENCOUNTER — HOSPITAL ENCOUNTER (INPATIENT)
Facility: HOSPITAL | Age: 59
LOS: 4 days | Discharge: HOME | End: 2019-07-10
Attending: INTERNAL MEDICINE | Admitting: THORACIC SURGERY (CARDIOTHORACIC VASCULAR SURGERY)

## 2019-06-26 DIAGNOSIS — I25.119 CORONARY ARTERY DISEASE INVOLVING NATIVE CORONARY ARTERY OF NATIVE HEART WITH ANGINA PECTORIS (HCC): Primary | ICD-10-CM

## 2019-06-26 DIAGNOSIS — R53.1 GENERAL WEAKNESS: ICD-10-CM

## 2019-06-26 DIAGNOSIS — R07.2 PRECORDIAL PAIN: ICD-10-CM

## 2019-06-26 LAB
ALBUMIN SERPL-MCNC: 4.5 G/DL (ref 3.5–5)
ALBUMIN/GLOB SERPL: 1.3 G/DL (ref 1.1–2.5)
ALP SERPL-CCNC: 106 U/L (ref 24–120)
ALT SERPL W P-5'-P-CCNC: 38 U/L (ref 0–54)
ANION GAP SERPL CALCULATED.3IONS-SCNC: 11 MMOL/L (ref 4–13)
AST SERPL-CCNC: 34 U/L (ref 7–45)
BASOPHILS # BLD AUTO: 0.03 10*3/MM3 (ref 0–0.2)
BASOPHILS NFR BLD AUTO: 0.6 % (ref 0–2)
BILIRUB SERPL-MCNC: 0.7 MG/DL (ref 0.1–1)
BUN BLD-MCNC: 21 MG/DL (ref 5–21)
BUN/CREAT SERPL: 23.3 (ref 7–25)
CALCIUM SPEC-SCNC: 9.7 MG/DL (ref 8.4–10.4)
CHLORIDE SERPL-SCNC: 105 MMOL/L (ref 98–110)
CO2 SERPL-SCNC: 22 MMOL/L (ref 24–31)
CREAT BLD-MCNC: 0.9 MG/DL (ref 0.5–1.4)
DEPRECATED RDW RBC AUTO: 39.7 FL (ref 40–54)
EOSINOPHIL # BLD AUTO: 0.18 10*3/MM3 (ref 0–0.7)
EOSINOPHIL NFR BLD AUTO: 3.8 % (ref 0–4)
ERYTHROCYTE [DISTWIDTH] IN BLOOD BY AUTOMATED COUNT: 12.6 % (ref 12–15)
GFR SERPL CREATININE-BSD FRML MDRD: 86 ML/MIN/1.73
GLOBULIN UR ELPH-MCNC: 3.4 GM/DL
GLUCOSE BLD-MCNC: 180 MG/DL (ref 70–100)
GLUCOSE BLDC GLUCOMTR-MCNC: 289 MG/DL (ref 70–130)
HCT VFR BLD AUTO: 42.1 % (ref 40–52)
HGB BLD-MCNC: 14.4 G/DL (ref 14–18)
IMM GRANULOCYTES # BLD AUTO: 0.02 10*3/MM3 (ref 0–0.05)
IMM GRANULOCYTES NFR BLD AUTO: 0.4 % (ref 0–5)
INR PPP: 0.9 (ref 0.91–1.09)
LYMPHOCYTES # BLD AUTO: 1.84 10*3/MM3 (ref 0.72–4.86)
LYMPHOCYTES NFR BLD AUTO: 39 % (ref 15–45)
MCH RBC QN AUTO: 29.6 PG (ref 28–32)
MCHC RBC AUTO-ENTMCNC: 34.2 G/DL (ref 33–36)
MCV RBC AUTO: 86.6 FL (ref 82–95)
MONOCYTES # BLD AUTO: 0.45 10*3/MM3 (ref 0.19–1.3)
MONOCYTES NFR BLD AUTO: 9.5 % (ref 4–12)
NEUTROPHILS # BLD AUTO: 2.2 10*3/MM3 (ref 1.87–8.4)
NEUTROPHILS NFR BLD AUTO: 46.7 % (ref 39–78)
NRBC BLD AUTO-RTO: 0 /100 WBC (ref 0–0.2)
PLATELET # BLD AUTO: 147 10*3/MM3 (ref 130–400)
PMV BLD AUTO: 11 FL (ref 6–12)
POTASSIUM BLD-SCNC: 4.4 MMOL/L (ref 3.5–5.3)
PROT SERPL-MCNC: 7.9 G/DL (ref 6.3–8.7)
PROTHROMBIN TIME: 12.4 SECONDS (ref 11.9–14.6)
RBC # BLD AUTO: 4.86 10*6/MM3 (ref 4.8–5.9)
SODIUM BLD-SCNC: 138 MMOL/L (ref 135–145)
WBC NRBC COR # BLD: 4.72 10*3/MM3 (ref 4.8–10.8)

## 2019-06-26 PROCEDURE — 25010000002 FENTANYL CITRATE (PF) 100 MCG/2ML SOLUTION: Performed by: INTERNAL MEDICINE

## 2019-06-26 PROCEDURE — 63710000001 LAMOTRIGINE 100 MG TABLET: Performed by: INTERNAL MEDICINE

## 2019-06-26 PROCEDURE — A9270 NON-COVERED ITEM OR SERVICE: HCPCS | Performed by: INTERNAL MEDICINE

## 2019-06-26 PROCEDURE — 25010000002 HEPARIN (PORCINE): Performed by: INTERNAL MEDICINE

## 2019-06-26 PROCEDURE — 80053 COMPREHEN METABOLIC PANEL: CPT | Performed by: INTERNAL MEDICINE

## 2019-06-26 PROCEDURE — 25010000002 MIDAZOLAM PER 1 MG: Performed by: INTERNAL MEDICINE

## 2019-06-26 PROCEDURE — 92928 PRQ TCAT PLMT NTRAC ST 1 LES: CPT | Performed by: INTERNAL MEDICINE

## 2019-06-26 PROCEDURE — 93572 IV DOP VEL&/PRESS C FLO EA: CPT | Performed by: INTERNAL MEDICINE

## 2019-06-26 PROCEDURE — 0 IOPAMIDOL PER 1 ML: Performed by: INTERNAL MEDICINE

## 2019-06-26 PROCEDURE — 85610 PROTHROMBIN TIME: CPT | Performed by: INTERNAL MEDICINE

## 2019-06-26 PROCEDURE — 94799 UNLISTED PULMONARY SVC/PX: CPT

## 2019-06-26 PROCEDURE — 25010000002 ADENOSINE (DIAGNOSTIC) PER 30 MG: Performed by: INTERNAL MEDICINE

## 2019-06-26 PROCEDURE — 93459 L HRT ART/GRFT ANGIO: CPT | Performed by: INTERNAL MEDICINE

## 2019-06-26 PROCEDURE — 93571 IV DOP VEL&/PRESS C FLO 1ST: CPT | Performed by: INTERNAL MEDICINE

## 2019-06-26 PROCEDURE — G0378 HOSPITAL OBSERVATION PER HR: HCPCS

## 2019-06-26 PROCEDURE — 25010000002 DIPHENHYDRAMINE PER 50 MG: Performed by: INTERNAL MEDICINE

## 2019-06-26 PROCEDURE — 63710000001 CLOPIDOGREL 75 MG TABLET: Performed by: INTERNAL MEDICINE

## 2019-06-26 PROCEDURE — 99153 MOD SED SAME PHYS/QHP EA: CPT

## 2019-06-26 PROCEDURE — 63710000001 INSULIN LISPRO (HUMAN) PER 5 UNITS: Performed by: INTERNAL MEDICINE

## 2019-06-26 PROCEDURE — C1894 INTRO/SHEATH, NON-LASER: HCPCS | Performed by: INTERNAL MEDICINE

## 2019-06-26 PROCEDURE — C1725 CATH, TRANSLUMIN NON-LASER: HCPCS | Performed by: INTERNAL MEDICINE

## 2019-06-26 PROCEDURE — 63710000001 ISOSORBIDE DINITRATE 10 MG TABLET: Performed by: INTERNAL MEDICINE

## 2019-06-26 PROCEDURE — 99152 MOD SED SAME PHYS/QHP 5/>YRS: CPT | Performed by: INTERNAL MEDICINE

## 2019-06-26 PROCEDURE — 63710000001 LISINOPRIL 20 MG TABLET: Performed by: INTERNAL MEDICINE

## 2019-06-26 PROCEDURE — C1887 CATHETER, GUIDING: HCPCS | Performed by: INTERNAL MEDICINE

## 2019-06-26 PROCEDURE — 82962 GLUCOSE BLOOD TEST: CPT

## 2019-06-26 PROCEDURE — 63710000001 METOPROLOL TARTRATE 50 MG TABLET: Performed by: INTERNAL MEDICINE

## 2019-06-26 PROCEDURE — C1769 GUIDE WIRE: HCPCS | Performed by: INTERNAL MEDICINE

## 2019-06-26 PROCEDURE — 63710000001 ISOSORBIDE DINITRATE 20 MG TABLET: Performed by: INTERNAL MEDICINE

## 2019-06-26 PROCEDURE — 63710000001 GABAPENTIN 100 MG CAPSULE: Performed by: INTERNAL MEDICINE

## 2019-06-26 PROCEDURE — 92920 PRQ TRLUML C ANGIOP 1ART&/BR: CPT | Performed by: INTERNAL MEDICINE

## 2019-06-26 PROCEDURE — 25010000002 BIVALIRUDIN TRIFLUOROACETATE 250 MG RECONSTITUTED SOLUTION 1 EACH VIAL: Performed by: INTERNAL MEDICINE

## 2019-06-26 PROCEDURE — 25010000002 BIVALIRUDIN 5 MG/ML: Performed by: INTERNAL MEDICINE

## 2019-06-26 PROCEDURE — 63710000001 LEVETIRACETAM 500 MG TABLET: Performed by: INTERNAL MEDICINE

## 2019-06-26 PROCEDURE — 85025 COMPLETE CBC W/AUTO DIFF WBC: CPT | Performed by: INTERNAL MEDICINE

## 2019-06-26 PROCEDURE — C1760 CLOSURE DEV, VASC: HCPCS | Performed by: INTERNAL MEDICINE

## 2019-06-26 PROCEDURE — 94760 N-INVAS EAR/PLS OXIMETRY 1: CPT

## 2019-06-26 RX ORDER — ASPIRIN 81 MG/1
81 TABLET, CHEWABLE ORAL DAILY
Status: DISCONTINUED | OUTPATIENT
Start: 2019-06-27 | End: 2019-07-06 | Stop reason: HOSPADM

## 2019-06-26 RX ORDER — TRIAMTERENE AND HYDROCHLOROTHIAZIDE 75; 50 MG/1; MG/1
0.5 TABLET ORAL DAILY
Status: DISCONTINUED | OUTPATIENT
Start: 2019-06-26 | End: 2019-07-06 | Stop reason: HOSPADM

## 2019-06-26 RX ORDER — DEXTROSE MONOHYDRATE 25 G/50ML
25 INJECTION, SOLUTION INTRAVENOUS
Status: DISCONTINUED | OUTPATIENT
Start: 2019-06-26 | End: 2019-07-05

## 2019-06-26 RX ORDER — LISINOPRIL 20 MG/1
20 TABLET ORAL NIGHTLY
Status: DISCONTINUED | OUTPATIENT
Start: 2019-06-26 | End: 2019-06-27

## 2019-06-26 RX ORDER — CLOPIDOGREL BISULFATE 75 MG/1
75 TABLET ORAL DAILY
Status: DISCONTINUED | OUTPATIENT
Start: 2019-06-27 | End: 2019-06-27

## 2019-06-26 RX ORDER — ADENOSINE 3 MG/ML
INJECTION, SOLUTION INTRAVENOUS CONTINUOUS PRN
Status: DISCONTINUED | OUTPATIENT
Start: 2019-06-26 | End: 2019-06-26 | Stop reason: HOSPADM

## 2019-06-26 RX ORDER — SODIUM CHLORIDE 9 MG/ML
100 INJECTION, SOLUTION INTRAVENOUS CONTINUOUS
Status: DISCONTINUED | OUTPATIENT
Start: 2019-06-26 | End: 2019-06-30

## 2019-06-26 RX ORDER — ROSUVASTATIN CALCIUM 20 MG/1
20 TABLET, COATED ORAL NIGHTLY
Status: DISCONTINUED | OUTPATIENT
Start: 2019-06-26 | End: 2019-06-26

## 2019-06-26 RX ORDER — METOPROLOL TARTRATE 50 MG/1
50 TABLET, FILM COATED ORAL EVERY 12 HOURS SCHEDULED
Status: DISCONTINUED | OUTPATIENT
Start: 2019-06-26 | End: 2019-06-27

## 2019-06-26 RX ORDER — LAMOTRIGINE 100 MG/1
200 TABLET ORAL 2 TIMES DAILY
Status: DISCONTINUED | OUTPATIENT
Start: 2019-06-26 | End: 2019-07-06 | Stop reason: HOSPADM

## 2019-06-26 RX ORDER — MIDAZOLAM HYDROCHLORIDE 1 MG/ML
INJECTION INTRAMUSCULAR; INTRAVENOUS AS NEEDED
Status: DISCONTINUED | OUTPATIENT
Start: 2019-06-26 | End: 2019-06-26 | Stop reason: HOSPADM

## 2019-06-26 RX ORDER — NITROGLYCERIN 0.4 MG/1
0.4 TABLET SUBLINGUAL
Status: DISCONTINUED | OUTPATIENT
Start: 2019-06-26 | End: 2019-07-06 | Stop reason: HOSPADM

## 2019-06-26 RX ORDER — LEVETIRACETAM 500 MG/1
2000 TABLET ORAL NIGHTLY
Status: DISCONTINUED | OUTPATIENT
Start: 2019-06-26 | End: 2019-06-26

## 2019-06-26 RX ORDER — CLOPIDOGREL BISULFATE 75 MG/1
TABLET ORAL AS NEEDED
Status: DISCONTINUED | OUTPATIENT
Start: 2019-06-26 | End: 2019-06-26 | Stop reason: HOSPADM

## 2019-06-26 RX ORDER — LIDOCAINE HYDROCHLORIDE 20 MG/ML
INJECTION, SOLUTION INFILTRATION; PERINEURAL AS NEEDED
Status: DISCONTINUED | OUTPATIENT
Start: 2019-06-26 | End: 2019-06-26 | Stop reason: HOSPADM

## 2019-06-26 RX ORDER — CLOPIDOGREL BISULFATE 75 MG/1
600 TABLET ORAL ONCE
Status: DISCONTINUED | OUTPATIENT
Start: 2019-06-26 | End: 2019-06-26 | Stop reason: SDUPTHER

## 2019-06-26 RX ORDER — FENTANYL CITRATE 50 UG/ML
INJECTION, SOLUTION INTRAMUSCULAR; INTRAVENOUS AS NEEDED
Status: DISCONTINUED | OUTPATIENT
Start: 2019-06-26 | End: 2019-06-26 | Stop reason: HOSPADM

## 2019-06-26 RX ORDER — ONDANSETRON 4 MG/1
4 TABLET, ORALLY DISINTEGRATING ORAL EVERY 6 HOURS PRN
Status: DISCONTINUED | OUTPATIENT
Start: 2019-06-26 | End: 2019-07-06 | Stop reason: HOSPADM

## 2019-06-26 RX ORDER — NICOTINE POLACRILEX 4 MG
15 LOZENGE BUCCAL
Status: DISCONTINUED | OUTPATIENT
Start: 2019-06-26 | End: 2019-07-06 | Stop reason: HOSPADM

## 2019-06-26 RX ORDER — SODIUM CHLORIDE 9 MG/ML
75 INJECTION, SOLUTION INTRAVENOUS CONTINUOUS
Status: DISCONTINUED | OUTPATIENT
Start: 2019-06-26 | End: 2019-07-03

## 2019-06-26 RX ORDER — DIPHENHYDRAMINE HYDROCHLORIDE 50 MG/ML
INJECTION INTRAMUSCULAR; INTRAVENOUS AS NEEDED
Status: DISCONTINUED | OUTPATIENT
Start: 2019-06-26 | End: 2019-06-26 | Stop reason: HOSPADM

## 2019-06-26 RX ORDER — GABAPENTIN 100 MG/1
100 CAPSULE ORAL NIGHTLY
Status: DISCONTINUED | OUTPATIENT
Start: 2019-06-26 | End: 2019-07-06 | Stop reason: HOSPADM

## 2019-06-26 RX ORDER — HYDROCODONE BITARTRATE AND ACETAMINOPHEN 7.5; 325 MG/1; MG/1
1 TABLET ORAL EVERY 4 HOURS PRN
Status: DISCONTINUED | OUTPATIENT
Start: 2019-06-26 | End: 2019-07-06 | Stop reason: HOSPADM

## 2019-06-26 RX ORDER — LEVETIRACETAM 500 MG/1
1500 TABLET ORAL DAILY
Status: DISCONTINUED | OUTPATIENT
Start: 2019-06-26 | End: 2019-07-06 | Stop reason: HOSPADM

## 2019-06-26 RX ORDER — ONDANSETRON 4 MG/1
4 TABLET, ORALLY DISINTEGRATING ORAL EVERY 4 HOURS PRN
Status: DISCONTINUED | OUTPATIENT
Start: 2019-06-26 | End: 2019-07-06 | Stop reason: HOSPADM

## 2019-06-26 RX ORDER — PANTOPRAZOLE SODIUM 40 MG/1
40 TABLET, DELAYED RELEASE ORAL
Status: DISCONTINUED | OUTPATIENT
Start: 2019-06-27 | End: 2019-07-06 | Stop reason: HOSPADM

## 2019-06-26 RX ORDER — FLUTICASONE PROPIONATE 50 MCG
2 SPRAY, SUSPENSION (ML) NASAL DAILY
Status: DISCONTINUED | OUTPATIENT
Start: 2019-06-27 | End: 2019-06-28

## 2019-06-26 RX ADMIN — LEVETIRACETAM 1500 MG: 500 TABLET, FILM COATED ORAL at 22:49

## 2019-06-26 RX ADMIN — LAMOTRIGINE 100 MG: 100 TABLET ORAL at 22:04

## 2019-06-26 RX ADMIN — METOPROLOL TARTRATE 50 MG: 50 TABLET ORAL at 22:04

## 2019-06-26 RX ADMIN — SODIUM CHLORIDE 75 ML/HR: 9 INJECTION, SOLUTION INTRAVENOUS at 14:00

## 2019-06-26 RX ADMIN — SODIUM CHLORIDE 100 ML/HR: 9 INJECTION, SOLUTION INTRAVENOUS at 18:46

## 2019-06-26 RX ADMIN — LISINOPRIL 20 MG: 20 TABLET ORAL at 22:05

## 2019-06-26 RX ADMIN — BIVALIRUDIN 1.75 MG/KG/HR: 250 INJECTION, POWDER, LYOPHILIZED, FOR SOLUTION INTRAVENOUS at 18:33

## 2019-06-26 RX ADMIN — INSULIN LISPRO 8 UNITS: 100 INJECTION, SOLUTION INTRAVENOUS; SUBCUTANEOUS at 22:50

## 2019-06-26 RX ADMIN — GABAPENTIN 100 MG: 100 CAPSULE ORAL at 22:04

## 2019-06-26 RX ADMIN — ISOSORBIDE DINITRATE 30 MG: 20 TABLET ORAL at 22:04

## 2019-06-27 ENCOUNTER — APPOINTMENT (OUTPATIENT)
Dept: ULTRASOUND IMAGING | Facility: HOSPITAL | Age: 59
End: 2019-06-27

## 2019-06-27 ENCOUNTER — APPOINTMENT (OUTPATIENT)
Dept: PULMONOLOGY | Facility: HOSPITAL | Age: 59
End: 2019-06-27

## 2019-06-27 LAB
ANION GAP SERPL CALCULATED.3IONS-SCNC: 9 MMOL/L (ref 4–13)
ARTERIAL PATENCY WRIST A: POSITIVE
ARTICHOKE IGE QN: 57 MG/DL (ref 0–99)
ATMOSPHERIC PRESS: 754 MMHG
BASE EXCESS BLDA CALC-SCNC: -2.3 MMOL/L (ref 0–2)
BDY SITE: ABNORMAL
BODY TEMPERATURE: 37 C
BUN BLD-MCNC: 16 MG/DL (ref 5–21)
BUN/CREAT SERPL: 20 (ref 7–25)
CALCIUM SPEC-SCNC: 8.5 MG/DL (ref 8.4–10.4)
CHLORIDE SERPL-SCNC: 107 MMOL/L (ref 98–110)
CHOLEST SERPL-MCNC: 128 MG/DL (ref 130–200)
CO2 SERPL-SCNC: 23 MMOL/L (ref 24–31)
CREAT BLD-MCNC: 0.8 MG/DL (ref 0.5–1.4)
DEPRECATED RDW RBC AUTO: 40.3 FL (ref 40–54)
ERYTHROCYTE [DISTWIDTH] IN BLOOD BY AUTOMATED COUNT: 12.9 % (ref 12–15)
GFR SERPL CREATININE-BSD FRML MDRD: 98 ML/MIN/1.73
GLUCOSE BLD-MCNC: 257 MG/DL (ref 70–100)
GLUCOSE BLDC GLUCOMTR-MCNC: 285 MG/DL (ref 70–130)
GLUCOSE BLDC GLUCOMTR-MCNC: 311 MG/DL (ref 70–130)
GLUCOSE BLDC GLUCOMTR-MCNC: 312 MG/DL (ref 70–130)
HBA1C MFR BLD: 7.4 %
HCO3 BLDA-SCNC: 21.8 MMOL/L (ref 20–26)
HCT VFR BLD AUTO: 36.7 % (ref 40–52)
HDLC SERPL-MCNC: 18 MG/DL
HGB BLD-MCNC: 12.7 G/DL (ref 14–18)
LDLC/HDLC SERPL: ABNORMAL {RATIO}
Lab: ABNORMAL
MCH RBC QN AUTO: 30 PG (ref 28–32)
MCHC RBC AUTO-ENTMCNC: 34.6 G/DL (ref 33–36)
MCV RBC AUTO: 86.6 FL (ref 82–95)
MODALITY: ABNORMAL
PCO2 BLDA: 34.8 MM HG (ref 35–45)
PH BLDA: 7.41 PH UNITS (ref 7.35–7.45)
PLATELET # BLD AUTO: 134 10*3/MM3 (ref 130–400)
PMV BLD AUTO: 11.5 FL (ref 6–12)
PO2 BLDA: 106 MM HG (ref 83–108)
POTASSIUM BLD-SCNC: 4.8 MMOL/L (ref 3.5–5.3)
RBC # BLD AUTO: 4.24 10*6/MM3 (ref 4.8–5.9)
SAO2 % BLDCOA: 98.1 % (ref 94–99)
SODIUM BLD-SCNC: 139 MMOL/L (ref 135–145)
TRIGL SERPL-MCNC: 443 MG/DL (ref 0–149)
TROPONIN I SERPL-MCNC: 0.03 NG/ML (ref 0–0.03)
VENTILATOR MODE: ABNORMAL
WBC NRBC COR # BLD: 3.93 10*3/MM3 (ref 4.8–10.8)

## 2019-06-27 PROCEDURE — 99233 SBSQ HOSP IP/OBS HIGH 50: CPT | Performed by: INTERNAL MEDICINE

## 2019-06-27 PROCEDURE — 94010 BREATHING CAPACITY TEST: CPT | Performed by: INTERNAL MEDICINE

## 2019-06-27 PROCEDURE — 63710000001 TRIAMTERENE-HYDROCHLOROTHIAZIDE 75-50 MG TABLET: Performed by: INTERNAL MEDICINE

## 2019-06-27 PROCEDURE — G0378 HOSPITAL OBSERVATION PER HR: HCPCS

## 2019-06-27 PROCEDURE — 80048 BASIC METABOLIC PNL TOTAL CA: CPT | Performed by: INTERNAL MEDICINE

## 2019-06-27 PROCEDURE — 80061 LIPID PANEL: CPT | Performed by: INTERNAL MEDICINE

## 2019-06-27 PROCEDURE — 94010 BREATHING CAPACITY TEST: CPT

## 2019-06-27 PROCEDURE — 25010000002 ENOXAPARIN PER 10 MG: Performed by: NURSE PRACTITIONER

## 2019-06-27 PROCEDURE — 63710000001 LEVETIRACETAM 500 MG TABLET: Performed by: INTERNAL MEDICINE

## 2019-06-27 PROCEDURE — 63710000001 FLUTICASONE 50 MCG/ACT SUSPENSION 16 G BOTTLE: Performed by: INTERNAL MEDICINE

## 2019-06-27 PROCEDURE — 93005 ELECTROCARDIOGRAM TRACING: CPT | Performed by: INTERNAL MEDICINE

## 2019-06-27 PROCEDURE — 36600 WITHDRAWAL OF ARTERIAL BLOOD: CPT

## 2019-06-27 PROCEDURE — 85027 COMPLETE CBC AUTOMATED: CPT | Performed by: INTERNAL MEDICINE

## 2019-06-27 PROCEDURE — 63710000001 NITROGLYCERIN 0.4 MG SUBLINGUAL TABLET 25 EACH BOTTLE: Performed by: INTERNAL MEDICINE

## 2019-06-27 PROCEDURE — A9270 NON-COVERED ITEM OR SERVICE: HCPCS | Performed by: INTERNAL MEDICINE

## 2019-06-27 PROCEDURE — 93880 EXTRACRANIAL BILAT STUDY: CPT | Performed by: SURGERY

## 2019-06-27 PROCEDURE — 82803 BLOOD GASES ANY COMBINATION: CPT

## 2019-06-27 PROCEDURE — 94760 N-INVAS EAR/PLS OXIMETRY 1: CPT

## 2019-06-27 PROCEDURE — 63710000001 INSULIN LISPRO (HUMAN) PER 5 UNITS: Performed by: INTERNAL MEDICINE

## 2019-06-27 PROCEDURE — 25010000002 ENOXAPARIN PER 10 MG: Performed by: INTERNAL MEDICINE

## 2019-06-27 PROCEDURE — 63710000001 LAMOTRIGINE 100 MG TABLET: Performed by: INTERNAL MEDICINE

## 2019-06-27 PROCEDURE — 63710000001 METOPROLOL TARTRATE 50 MG TABLET: Performed by: INTERNAL MEDICINE

## 2019-06-27 PROCEDURE — 63710000001 CLOPIDOGREL 75 MG TABLET: Performed by: INTERNAL MEDICINE

## 2019-06-27 PROCEDURE — 99204 OFFICE O/P NEW MOD 45 MIN: CPT | Performed by: NURSE PRACTITIONER

## 2019-06-27 PROCEDURE — 93010 ELECTROCARDIOGRAM REPORT: CPT | Performed by: INTERNAL MEDICINE

## 2019-06-27 PROCEDURE — 93880 EXTRACRANIAL BILAT STUDY: CPT

## 2019-06-27 PROCEDURE — 63710000001 ASPIRIN 81 MG CHEWABLE TABLET: Performed by: INTERNAL MEDICINE

## 2019-06-27 PROCEDURE — 63710000001 GABAPENTIN 100 MG CAPSULE: Performed by: INTERNAL MEDICINE

## 2019-06-27 PROCEDURE — 94799 UNLISTED PULMONARY SVC/PX: CPT

## 2019-06-27 PROCEDURE — 63710000001 METOPROLOL TARTRATE 25 MG TABLET: Performed by: INTERNAL MEDICINE

## 2019-06-27 PROCEDURE — 84484 ASSAY OF TROPONIN QUANT: CPT | Performed by: INTERNAL MEDICINE

## 2019-06-27 PROCEDURE — 83036 HEMOGLOBIN GLYCOSYLATED A1C: CPT | Performed by: INTERNAL MEDICINE

## 2019-06-27 PROCEDURE — 63710000001 PANTOPRAZOLE 40 MG TABLET DELAYED-RELEASE: Performed by: INTERNAL MEDICINE

## 2019-06-27 PROCEDURE — 82962 GLUCOSE BLOOD TEST: CPT

## 2019-06-27 RX ADMIN — INSULIN LISPRO 8 UNITS: 100 INJECTION, SOLUTION INTRAVENOUS; SUBCUTANEOUS at 20:58

## 2019-06-27 RX ADMIN — ENOXAPARIN SODIUM 110 MG: 120 INJECTION SUBCUTANEOUS at 18:03

## 2019-06-27 RX ADMIN — LEVETIRACETAM 1500 MG: 500 TABLET, FILM COATED ORAL at 08:06

## 2019-06-27 RX ADMIN — GABAPENTIN 100 MG: 100 CAPSULE ORAL at 20:59

## 2019-06-27 RX ADMIN — INSULIN LISPRO 10 UNITS: 100 INJECTION, SOLUTION INTRAVENOUS; SUBCUTANEOUS at 18:03

## 2019-06-27 RX ADMIN — LAMOTRIGINE 100 MG: 100 TABLET ORAL at 08:06

## 2019-06-27 RX ADMIN — NITROGLYCERIN 0.4 MG: 0.4 TABLET SUBLINGUAL at 07:56

## 2019-06-27 RX ADMIN — INSULIN LISPRO 10 UNITS: 100 INJECTION, SOLUTION INTRAVENOUS; SUBCUTANEOUS at 12:34

## 2019-06-27 RX ADMIN — PANTOPRAZOLE SODIUM 40 MG: 40 TABLET, DELAYED RELEASE ORAL at 05:45

## 2019-06-27 RX ADMIN — SODIUM CHLORIDE 75 ML/HR: 9 INJECTION, SOLUTION INTRAVENOUS at 00:55

## 2019-06-27 RX ADMIN — FLUTICASONE PROPIONATE 2 SPRAY: 50 SPRAY, METERED NASAL at 08:08

## 2019-06-27 RX ADMIN — ENOXAPARIN SODIUM 40 MG: 40 INJECTION SUBCUTANEOUS at 08:21

## 2019-06-27 RX ADMIN — METOPROLOL TARTRATE 25 MG: 50 TABLET ORAL at 20:57

## 2019-06-27 RX ADMIN — LAMOTRIGINE 100 MG: 100 TABLET ORAL at 20:57

## 2019-06-27 RX ADMIN — INSULIN LISPRO 5 UNITS: 100 INJECTION, SOLUTION INTRAVENOUS; SUBCUTANEOUS at 08:20

## 2019-06-27 RX ADMIN — METOPROLOL TARTRATE 25 MG: 50 TABLET ORAL at 08:06

## 2019-06-27 RX ADMIN — TRIAMTERENE AND HYDROCHLOROTHIAZIDE 0.5 TABLET: 75; 50 TABLET ORAL at 08:05

## 2019-06-27 RX ADMIN — CLOPIDOGREL 75 MG: 75 TABLET, FILM COATED ORAL at 08:07

## 2019-06-27 RX ADMIN — ASPIRIN 81 MG: 81 TABLET, CHEWABLE ORAL at 08:07

## 2019-06-27 RX ADMIN — SODIUM CHLORIDE 75 ML/HR: 9 INJECTION, SOLUTION INTRAVENOUS at 18:03

## 2019-06-28 ENCOUNTER — APPOINTMENT (OUTPATIENT)
Dept: CARDIOLOGY | Facility: HOSPITAL | Age: 59
End: 2019-06-28

## 2019-06-28 ENCOUNTER — APPOINTMENT (OUTPATIENT)
Dept: CT IMAGING | Facility: HOSPITAL | Age: 59
End: 2019-06-28

## 2019-06-28 PROBLEM — I65.22 STENOSIS OF LEFT CAROTID ARTERY: Status: ACTIVE | Noted: 2019-06-28

## 2019-06-28 LAB
ALBUMIN SERPL-MCNC: 4.4 G/DL (ref 3.5–5)
ALBUMIN/GLOB SERPL: 1.4 G/DL (ref 1.1–2.5)
ALP SERPL-CCNC: 108 U/L (ref 24–120)
ALT SERPL W P-5'-P-CCNC: 33 U/L (ref 0–54)
ANION GAP SERPL CALCULATED.3IONS-SCNC: 9 MMOL/L (ref 4–13)
AST SERPL-CCNC: 35 U/L (ref 7–45)
BASOPHILS # BLD AUTO: 0.03 10*3/MM3 (ref 0–0.2)
BASOPHILS NFR BLD AUTO: 0.7 % (ref 0–2)
BILIRUB SERPL-MCNC: 0.6 MG/DL (ref 0.1–1)
BUN BLD-MCNC: 14 MG/DL (ref 5–21)
BUN/CREAT SERPL: 17.1 (ref 7–25)
CALCIUM SPEC-SCNC: 9.5 MG/DL (ref 8.4–10.4)
CHLORIDE SERPL-SCNC: 103 MMOL/L (ref 98–110)
CO2 SERPL-SCNC: 26 MMOL/L (ref 24–31)
CREAT BLD-MCNC: 0.82 MG/DL (ref 0.5–1.4)
DEPRECATED RDW RBC AUTO: 39 FL (ref 40–54)
EOSINOPHIL # BLD AUTO: 0.18 10*3/MM3 (ref 0–0.7)
EOSINOPHIL NFR BLD AUTO: 4.5 % (ref 0–4)
ERYTHROCYTE [DISTWIDTH] IN BLOOD BY AUTOMATED COUNT: 12.5 % (ref 12–15)
GFR SERPL CREATININE-BSD FRML MDRD: 96 ML/MIN/1.73
GLOBULIN UR ELPH-MCNC: 3.2 GM/DL
GLUCOSE BLD-MCNC: 290 MG/DL (ref 70–100)
GLUCOSE BLDC GLUCOMTR-MCNC: 237 MG/DL (ref 70–130)
GLUCOSE BLDC GLUCOMTR-MCNC: 270 MG/DL (ref 70–130)
GLUCOSE BLDC GLUCOMTR-MCNC: 280 MG/DL (ref 70–130)
GLUCOSE BLDC GLUCOMTR-MCNC: 283 MG/DL (ref 70–130)
HCT VFR BLD AUTO: 38.9 % (ref 40–52)
HGB BLD-MCNC: 13.4 G/DL (ref 14–18)
IMM GRANULOCYTES # BLD AUTO: 0.02 10*3/MM3 (ref 0–0.05)
IMM GRANULOCYTES NFR BLD AUTO: 0.5 % (ref 0–5)
LYMPHOCYTES # BLD AUTO: 1.55 10*3/MM3 (ref 0.72–4.86)
LYMPHOCYTES NFR BLD AUTO: 38.6 % (ref 15–45)
MCH RBC QN AUTO: 29.6 PG (ref 28–32)
MCHC RBC AUTO-ENTMCNC: 34.4 G/DL (ref 33–36)
MCV RBC AUTO: 85.9 FL (ref 82–95)
MONOCYTES # BLD AUTO: 0.3 10*3/MM3 (ref 0.19–1.3)
MONOCYTES NFR BLD AUTO: 7.5 % (ref 4–12)
NEUTROPHILS # BLD AUTO: 1.94 10*3/MM3 (ref 1.87–8.4)
NEUTROPHILS NFR BLD AUTO: 48.2 % (ref 39–78)
NRBC BLD AUTO-RTO: 0 /100 WBC (ref 0–0.2)
PLATELET # BLD AUTO: 124 10*3/MM3 (ref 130–400)
PMV BLD AUTO: 10.9 FL (ref 6–12)
POTASSIUM BLD-SCNC: 4.5 MMOL/L (ref 3.5–5.3)
PROT SERPL-MCNC: 7.6 G/DL (ref 6.3–8.7)
RBC # BLD AUTO: 4.53 10*6/MM3 (ref 4.8–5.9)
SODIUM BLD-SCNC: 138 MMOL/L (ref 135–145)
TROPONIN I SERPL-MCNC: <0.012 NG/ML (ref 0–0.03)
WBC NRBC COR # BLD: 4.02 10*3/MM3 (ref 4.8–10.8)

## 2019-06-28 PROCEDURE — A9270 NON-COVERED ITEM OR SERVICE: HCPCS | Performed by: INTERNAL MEDICINE

## 2019-06-28 PROCEDURE — 99212 OFFICE O/P EST SF 10 MIN: CPT | Performed by: NURSE PRACTITIONER

## 2019-06-28 PROCEDURE — 0 IOPAMIDOL PER 1 ML: Performed by: INTERNAL MEDICINE

## 2019-06-28 PROCEDURE — 93306 TTE W/DOPPLER COMPLETE: CPT

## 2019-06-28 PROCEDURE — G0378 HOSPITAL OBSERVATION PER HR: HCPCS

## 2019-06-28 PROCEDURE — 63710000001 TRIAMTERENE-HYDROCHLOROTHIAZIDE 75-50 MG TABLET: Performed by: INTERNAL MEDICINE

## 2019-06-28 PROCEDURE — 71275 CT ANGIOGRAPHY CHEST: CPT

## 2019-06-28 PROCEDURE — 63710000001 ASPIRIN 81 MG CHEWABLE TABLET: Performed by: INTERNAL MEDICINE

## 2019-06-28 PROCEDURE — 93306 TTE W/DOPPLER COMPLETE: CPT | Performed by: INTERNAL MEDICINE

## 2019-06-28 PROCEDURE — 82962 GLUCOSE BLOOD TEST: CPT

## 2019-06-28 PROCEDURE — 63710000001 GABAPENTIN 100 MG CAPSULE: Performed by: INTERNAL MEDICINE

## 2019-06-28 PROCEDURE — 63710000001 INSULIN LISPRO (HUMAN) PER 5 UNITS: Performed by: INTERNAL MEDICINE

## 2019-06-28 PROCEDURE — 63710000001 LEVETIRACETAM 500 MG TABLET: Performed by: INTERNAL MEDICINE

## 2019-06-28 PROCEDURE — 63710000001 PANTOPRAZOLE 40 MG TABLET DELAYED-RELEASE: Performed by: INTERNAL MEDICINE

## 2019-06-28 PROCEDURE — 99233 SBSQ HOSP IP/OBS HIGH 50: CPT | Performed by: INTERNAL MEDICINE

## 2019-06-28 PROCEDURE — 63710000001 METOPROLOL TARTRATE 25 MG TABLET: Performed by: INTERNAL MEDICINE

## 2019-06-28 PROCEDURE — 80053 COMPREHEN METABOLIC PANEL: CPT | Performed by: INTERNAL MEDICINE

## 2019-06-28 PROCEDURE — 63710000001 LAMOTRIGINE 100 MG TABLET: Performed by: INTERNAL MEDICINE

## 2019-06-28 PROCEDURE — 25010000002 ENOXAPARIN PER 10 MG: Performed by: NURSE PRACTITIONER

## 2019-06-28 PROCEDURE — 85025 COMPLETE CBC W/AUTO DIFF WBC: CPT | Performed by: INTERNAL MEDICINE

## 2019-06-28 PROCEDURE — 63710000001 ISOSORBIDE MONONITRATE 30 MG TABLET SUSTAINED-RELEASE 24 HOUR: Performed by: INTERNAL MEDICINE

## 2019-06-28 PROCEDURE — 84484 ASSAY OF TROPONIN QUANT: CPT | Performed by: INTERNAL MEDICINE

## 2019-06-28 PROCEDURE — 25010000002 PERFLUTREN 6.52 MG/ML SUSPENSION: Performed by: INTERNAL MEDICINE

## 2019-06-28 RX ORDER — LEVETIRACETAM 500 MG/1
2000 TABLET ORAL NIGHTLY
Status: DISCONTINUED | OUTPATIENT
Start: 2019-06-28 | End: 2019-07-06 | Stop reason: HOSPADM

## 2019-06-28 RX ORDER — FLUTICASONE PROPIONATE 50 MCG
2 SPRAY, SUSPENSION (ML) NASAL DAILY PRN
Status: DISCONTINUED | OUTPATIENT
Start: 2019-06-28 | End: 2019-07-06 | Stop reason: HOSPADM

## 2019-06-28 RX ORDER — ISOSORBIDE MONONITRATE 30 MG/1
30 TABLET, EXTENDED RELEASE ORAL
Status: DISCONTINUED | OUTPATIENT
Start: 2019-06-28 | End: 2019-07-06 | Stop reason: HOSPADM

## 2019-06-28 RX ORDER — ISOSORBIDE MONONITRATE 30 MG/1
30 TABLET, EXTENDED RELEASE ORAL EVERY 12 HOURS
COMMUNITY
End: 2019-07-10 | Stop reason: HOSPADM

## 2019-06-28 RX ADMIN — PERFLUTREN 8.48 MG: 6.52 INJECTION, SUSPENSION INTRAVENOUS at 09:11

## 2019-06-28 RX ADMIN — METOPROLOL TARTRATE 25 MG: 50 TABLET ORAL at 20:32

## 2019-06-28 RX ADMIN — INSULIN LISPRO 5 UNITS: 100 INJECTION, SOLUTION INTRAVENOUS; SUBCUTANEOUS at 17:42

## 2019-06-28 RX ADMIN — INSULIN LISPRO 8 UNITS: 100 INJECTION, SOLUTION INTRAVENOUS; SUBCUTANEOUS at 20:39

## 2019-06-28 RX ADMIN — PANTOPRAZOLE SODIUM 40 MG: 40 TABLET, DELAYED RELEASE ORAL at 05:52

## 2019-06-28 RX ADMIN — ENOXAPARIN SODIUM 110 MG: 120 INJECTION SUBCUTANEOUS at 17:44

## 2019-06-28 RX ADMIN — FLUTICASONE PROPIONATE 2 SPRAY: 50 SPRAY, METERED NASAL at 08:17

## 2019-06-28 RX ADMIN — LEVETIRACETAM 1500 MG: 500 TABLET, FILM COATED ORAL at 08:14

## 2019-06-28 RX ADMIN — ENOXAPARIN SODIUM 110 MG: 120 INJECTION SUBCUTANEOUS at 05:53

## 2019-06-28 RX ADMIN — TRIAMTERENE AND HYDROCHLOROTHIAZIDE 0.5 TABLET: 75; 50 TABLET ORAL at 08:13

## 2019-06-28 RX ADMIN — SODIUM CHLORIDE 75 ML/HR: 9 INJECTION, SOLUTION INTRAVENOUS at 06:23

## 2019-06-28 RX ADMIN — INSULIN LISPRO 8 UNITS: 100 INJECTION, SOLUTION INTRAVENOUS; SUBCUTANEOUS at 12:17

## 2019-06-28 RX ADMIN — INSULIN LISPRO 8 UNITS: 100 INJECTION, SOLUTION INTRAVENOUS; SUBCUTANEOUS at 08:15

## 2019-06-28 RX ADMIN — LAMOTRIGINE 100 MG: 100 TABLET ORAL at 20:33

## 2019-06-28 RX ADMIN — Medication 2 SPRAY: at 20:47

## 2019-06-28 RX ADMIN — ISOSORBIDE MONONITRATE 30 MG: 30 TABLET, EXTENDED RELEASE ORAL at 10:28

## 2019-06-28 RX ADMIN — GABAPENTIN 100 MG: 100 CAPSULE ORAL at 20:39

## 2019-06-28 RX ADMIN — LEVETIRACETAM 2000 MG: 500 TABLET, FILM COATED ORAL at 20:32

## 2019-06-28 RX ADMIN — LAMOTRIGINE 100 MG: 100 TABLET ORAL at 08:15

## 2019-06-28 RX ADMIN — SODIUM CHLORIDE 75 ML/HR: 9 INJECTION, SOLUTION INTRAVENOUS at 00:53

## 2019-06-28 RX ADMIN — IOPAMIDOL 125 ML: 755 INJECTION, SOLUTION INTRAVENOUS at 16:00

## 2019-06-28 RX ADMIN — METOPROLOL TARTRATE 25 MG: 50 TABLET ORAL at 08:14

## 2019-06-28 RX ADMIN — ASPIRIN 81 MG: 81 TABLET, CHEWABLE ORAL at 08:14

## 2019-06-29 LAB
BH CV ECHO MEAS - AO MAX PG (FULL): 17.4 MMHG
BH CV ECHO MEAS - AO MAX PG: 27.7 MMHG
BH CV ECHO MEAS - AO MEAN PG (FULL): 6 MMHG
BH CV ECHO MEAS - AO MEAN PG: 14 MMHG
BH CV ECHO MEAS - AO ROOT AREA (BSA CORRECTED): 1.4
BH CV ECHO MEAS - AO ROOT AREA: 8 CM^2
BH CV ECHO MEAS - AO ROOT DIAM: 3.2 CM
BH CV ECHO MEAS - AO V2 MAX: 263 CM/SEC
BH CV ECHO MEAS - AO V2 MEAN: 173.3 CM/SEC
BH CV ECHO MEAS - AO V2 VTI: 55.6 CM
BH CV ECHO MEAS - AVA(I,A): 2.5 CM^2
BH CV ECHO MEAS - AVA(I,D): 2.5 CM^2
BH CV ECHO MEAS - AVA(V,A): 1.9 CM^2
BH CV ECHO MEAS - AVA(V,D): 1.9 CM^2
BH CV ECHO MEAS - BSA(HAYCOCK): 2.4 M^2
BH CV ECHO MEAS - BSA: 2.3 M^2
BH CV ECHO MEAS - BZI_BMI: 33.2 KILOGRAMS/M^2
BH CV ECHO MEAS - BZI_METRIC_HEIGHT: 182.9 CM
BH CV ECHO MEAS - BZI_METRIC_WEIGHT: 111.1 KG
BH CV ECHO MEAS - EDV(CUBED): 99.9 ML
BH CV ECHO MEAS - EDV(MOD-SP4): 162 ML
BH CV ECHO MEAS - EDV(TEICH): 99.3 ML
BH CV ECHO MEAS - EF(CUBED): 77.6 %
BH CV ECHO MEAS - EF(MOD-SP4): 69.4 %
BH CV ECHO MEAS - EF(TEICH): 69.7 %
BH CV ECHO MEAS - ESV(CUBED): 22.4 ML
BH CV ECHO MEAS - ESV(MOD-SP4): 49.6 ML
BH CV ECHO MEAS - ESV(TEICH): 30.1 ML
BH CV ECHO MEAS - FS: 39.2 %
BH CV ECHO MEAS - IVS/LVPW: 0.98
BH CV ECHO MEAS - IVSD: 1.6 CM
BH CV ECHO MEAS - LA DIMENSION: 4 CM
BH CV ECHO MEAS - LA/AO: 1.3
BH CV ECHO MEAS - LAT PEAK E' VEL: 7.7 CM/SEC
BH CV ECHO MEAS - LV DIASTOLIC VOL/BSA (35-75): 69.8 ML/M^2
BH CV ECHO MEAS - LV MASS(C)D: 301.8 GRAMS
BH CV ECHO MEAS - LV MASS(C)DI: 129.9 GRAMS/M^2
BH CV ECHO MEAS - LV MAX PG: 10.2 MMHG
BH CV ECHO MEAS - LV MEAN PG: 8 MMHG
BH CV ECHO MEAS - LV SYSTOLIC VOL/BSA (12-30): 21.4 ML/M^2
BH CV ECHO MEAS - LV V1 MAX: 157.5 CM/SEC
BH CV ECHO MEAS - LV V1 MEAN: 134 CM/SEC
BH CV ECHO MEAS - LV V1 VTI: 44.1 CM
BH CV ECHO MEAS - LVIDD: 4.6 CM
BH CV ECHO MEAS - LVIDS: 2.8 CM
BH CV ECHO MEAS - LVLD AP4: 9.6 CM
BH CV ECHO MEAS - LVLS AP4: 8.1 CM
BH CV ECHO MEAS - LVOT AREA (M): 3.1 CM^2
BH CV ECHO MEAS - LVOT AREA: 3.1 CM^2
BH CV ECHO MEAS - LVOT DIAM: 2 CM
BH CV ECHO MEAS - LVPWD: 1.6 CM
BH CV ECHO MEAS - MED PEAK E' VEL: 5.11 CM/SEC
BH CV ECHO MEAS - MV A MAX VEL: 111 CM/SEC
BH CV ECHO MEAS - MV DEC TIME: 0.26 SEC
BH CV ECHO MEAS - MV E MAX VEL: 104 CM/SEC
BH CV ECHO MEAS - MV E/A: 0.94
BH CV ECHO MEAS - RAP SYSTOLE: 5 MMHG
BH CV ECHO MEAS - RVSP: 20.4 MMHG
BH CV ECHO MEAS - SI(AO): 192.7 ML/M^2
BH CV ECHO MEAS - SI(CUBED): 33.4 ML/M^2
BH CV ECHO MEAS - SI(LVOT): 59.7 ML/M^2
BH CV ECHO MEAS - SI(MOD-SP4): 48.4 ML/M^2
BH CV ECHO MEAS - SI(TEICH): 29.8 ML/M^2
BH CV ECHO MEAS - SV(AO): 447.4 ML
BH CV ECHO MEAS - SV(CUBED): 77.5 ML
BH CV ECHO MEAS - SV(LVOT): 138.5 ML
BH CV ECHO MEAS - SV(MOD-SP4): 112.4 ML
BH CV ECHO MEAS - SV(TEICH): 69.3 ML
BH CV ECHO MEAS - TR MAX VEL: 196 CM/SEC
BH CV ECHO MEASUREMENTS AVERAGE E/E' RATIO: 16.24
GLUCOSE BLDC GLUCOMTR-MCNC: 279 MG/DL (ref 70–130)
GLUCOSE BLDC GLUCOMTR-MCNC: 303 MG/DL (ref 70–130)
GLUCOSE BLDC GLUCOMTR-MCNC: 334 MG/DL (ref 70–130)
GLUCOSE BLDC GLUCOMTR-MCNC: 359 MG/DL (ref 70–130)
LEFT ATRIUM VOLUME INDEX: 34.1 ML/M2
LEFT ATRIUM VOLUME: 79.2 CM3
LV EF 2D ECHO EST: 65 %
MAXIMAL PREDICTED HEART RATE: 159 BPM
STRESS TARGET HR: 135 BPM

## 2019-06-29 PROCEDURE — 63710000001 INSULIN DETEMIR PER 5 UNITS: Performed by: FAMILY MEDICINE

## 2019-06-29 PROCEDURE — 63710000001 ASPIRIN 81 MG CHEWABLE TABLET: Performed by: INTERNAL MEDICINE

## 2019-06-29 PROCEDURE — A9270 NON-COVERED ITEM OR SERVICE: HCPCS | Performed by: INTERNAL MEDICINE

## 2019-06-29 PROCEDURE — 63710000001 GABAPENTIN 100 MG CAPSULE: Performed by: INTERNAL MEDICINE

## 2019-06-29 PROCEDURE — 82962 GLUCOSE BLOOD TEST: CPT

## 2019-06-29 PROCEDURE — 63710000001 ISOSORBIDE MONONITRATE 30 MG TABLET SUSTAINED-RELEASE 24 HOUR: Performed by: INTERNAL MEDICINE

## 2019-06-29 PROCEDURE — 63710000001 PANTOPRAZOLE 40 MG TABLET DELAYED-RELEASE: Performed by: INTERNAL MEDICINE

## 2019-06-29 PROCEDURE — A9270 NON-COVERED ITEM OR SERVICE: HCPCS | Performed by: FAMILY MEDICINE

## 2019-06-29 PROCEDURE — G0378 HOSPITAL OBSERVATION PER HR: HCPCS

## 2019-06-29 PROCEDURE — 25010000002 ENOXAPARIN PER 10 MG: Performed by: NURSE PRACTITIONER

## 2019-06-29 PROCEDURE — 63710000001 LEVETIRACETAM 500 MG TABLET: Performed by: INTERNAL MEDICINE

## 2019-06-29 PROCEDURE — 63710000001 INSULIN LISPRO (HUMAN) PER 5 UNITS: Performed by: INTERNAL MEDICINE

## 2019-06-29 PROCEDURE — 63710000001 TRIAMTERENE-HYDROCHLOROTHIAZIDE 75-50 MG TABLET: Performed by: INTERNAL MEDICINE

## 2019-06-29 PROCEDURE — 63710000001 METOPROLOL TARTRATE 25 MG TABLET: Performed by: INTERNAL MEDICINE

## 2019-06-29 PROCEDURE — 63710000001 LAMOTRIGINE 100 MG TABLET: Performed by: INTERNAL MEDICINE

## 2019-06-29 RX ADMIN — GABAPENTIN 100 MG: 100 CAPSULE ORAL at 20:34

## 2019-06-29 RX ADMIN — LEVETIRACETAM 1500 MG: 500 TABLET, FILM COATED ORAL at 08:21

## 2019-06-29 RX ADMIN — ISOSORBIDE MONONITRATE 30 MG: 30 TABLET, EXTENDED RELEASE ORAL at 08:18

## 2019-06-29 RX ADMIN — ENOXAPARIN SODIUM 110 MG: 120 INJECTION SUBCUTANEOUS at 05:34

## 2019-06-29 RX ADMIN — INSULIN LISPRO 10 UNITS: 100 INJECTION, SOLUTION INTRAVENOUS; SUBCUTANEOUS at 12:20

## 2019-06-29 RX ADMIN — LEVETIRACETAM 2000 MG: 500 TABLET, FILM COATED ORAL at 20:35

## 2019-06-29 RX ADMIN — METOPROLOL TARTRATE 25 MG: 50 TABLET ORAL at 20:35

## 2019-06-29 RX ADMIN — ASPIRIN 81 MG: 81 TABLET, CHEWABLE ORAL at 08:18

## 2019-06-29 RX ADMIN — PANTOPRAZOLE SODIUM 40 MG: 40 TABLET, DELAYED RELEASE ORAL at 05:34

## 2019-06-29 RX ADMIN — LAMOTRIGINE 100 MG: 100 TABLET ORAL at 20:34

## 2019-06-29 RX ADMIN — LAMOTRIGINE 100 MG: 100 TABLET ORAL at 08:19

## 2019-06-29 RX ADMIN — INSULIN LISPRO 10 UNITS: 100 INJECTION, SOLUTION INTRAVENOUS; SUBCUTANEOUS at 08:17

## 2019-06-29 RX ADMIN — INSULIN LISPRO 8 UNITS: 100 INJECTION, SOLUTION INTRAVENOUS; SUBCUTANEOUS at 18:00

## 2019-06-29 RX ADMIN — ENOXAPARIN SODIUM 110 MG: 120 INJECTION SUBCUTANEOUS at 18:00

## 2019-06-29 RX ADMIN — METOPROLOL TARTRATE 25 MG: 50 TABLET ORAL at 08:18

## 2019-06-29 RX ADMIN — INSULIN DETEMIR 40 UNITS: 100 INJECTION, SOLUTION SUBCUTANEOUS at 20:22

## 2019-06-29 RX ADMIN — INSULIN LISPRO 12 UNITS: 100 INJECTION, SOLUTION INTRAVENOUS; SUBCUTANEOUS at 20:21

## 2019-06-29 RX ADMIN — TRIAMTERENE AND HYDROCHLOROTHIAZIDE 0.5 TABLET: 75; 50 TABLET ORAL at 10:24

## 2019-06-29 RX ADMIN — NITROGLYCERIN 0.4 MG: 0.4 TABLET SUBLINGUAL at 07:06

## 2019-06-30 LAB
ALBUMIN SERPL-MCNC: 4.1 G/DL (ref 3.5–5)
ALBUMIN/GLOB SERPL: 1.4 G/DL (ref 1.1–2.5)
ALP SERPL-CCNC: 99 U/L (ref 24–120)
ALT SERPL W P-5'-P-CCNC: 37 U/L (ref 0–54)
ANION GAP SERPL CALCULATED.3IONS-SCNC: 9 MMOL/L (ref 4–13)
ARTICHOKE IGE QN: 51 MG/DL (ref 0–99)
AST SERPL-CCNC: 33 U/L (ref 7–45)
BASOPHILS # BLD AUTO: 0.03 10*3/MM3 (ref 0–0.2)
BASOPHILS NFR BLD AUTO: 0.6 % (ref 0–2)
BILIRUB SERPL-MCNC: 0.6 MG/DL (ref 0.1–1)
BUN BLD-MCNC: 18 MG/DL (ref 5–21)
BUN/CREAT SERPL: 17.1 (ref 7–25)
CALCIUM SPEC-SCNC: 9.5 MG/DL (ref 8.4–10.4)
CHLORIDE SERPL-SCNC: 101 MMOL/L (ref 98–110)
CHOLEST SERPL-MCNC: 178 MG/DL (ref 130–200)
CO2 SERPL-SCNC: 27 MMOL/L (ref 24–31)
CREAT BLD-MCNC: 1.05 MG/DL (ref 0.5–1.4)
DEPRECATED RDW RBC AUTO: 38.4 FL (ref 40–54)
EOSINOPHIL # BLD AUTO: 0.19 10*3/MM3 (ref 0–0.7)
EOSINOPHIL NFR BLD AUTO: 3.9 % (ref 0–4)
ERYTHROCYTE [DISTWIDTH] IN BLOOD BY AUTOMATED COUNT: 12.5 % (ref 12–15)
GFR SERPL CREATININE-BSD FRML MDRD: 72 ML/MIN/1.73
GLOBULIN UR ELPH-MCNC: 3 GM/DL
GLUCOSE BLD-MCNC: 321 MG/DL (ref 70–100)
GLUCOSE BLDC GLUCOMTR-MCNC: 285 MG/DL (ref 70–130)
GLUCOSE BLDC GLUCOMTR-MCNC: 287 MG/DL (ref 70–130)
GLUCOSE BLDC GLUCOMTR-MCNC: 336 MG/DL (ref 70–130)
GLUCOSE BLDC GLUCOMTR-MCNC: 352 MG/DL (ref 70–130)
HCT VFR BLD AUTO: 38.6 % (ref 40–52)
HDLC SERPL-MCNC: 21 MG/DL
HGB BLD-MCNC: 13.6 G/DL (ref 14–18)
LDLC/HDLC SERPL: ABNORMAL {RATIO}
LYMPHOCYTES # BLD AUTO: 1.62 10*3/MM3 (ref 0.72–4.86)
LYMPHOCYTES NFR BLD AUTO: 33.1 % (ref 15–45)
MCH RBC QN AUTO: 29.9 PG (ref 28–32)
MCHC RBC AUTO-ENTMCNC: 35.2 G/DL (ref 33–36)
MCV RBC AUTO: 84.8 FL (ref 82–95)
MONOCYTES # BLD AUTO: 0.4 10*3/MM3 (ref 0.19–1.3)
MONOCYTES NFR BLD AUTO: 8.2 % (ref 4–12)
NEUTROPHILS # BLD AUTO: 2.62 10*3/MM3 (ref 1.87–8.4)
NEUTROPHILS NFR BLD AUTO: 53.4 % (ref 39–78)
PLATELET # BLD AUTO: 115 10*3/MM3 (ref 130–400)
PMV BLD AUTO: 11.7 FL (ref 6–12)
POTASSIUM BLD-SCNC: 4.4 MMOL/L (ref 3.5–5.3)
PROT SERPL-MCNC: 7.1 G/DL (ref 6.3–8.7)
RBC # BLD AUTO: 4.55 10*6/MM3 (ref 4.8–5.9)
SODIUM BLD-SCNC: 137 MMOL/L (ref 135–145)
TRIGL SERPL-MCNC: 871 MG/DL (ref 0–149)
TSH SERPL DL<=0.05 MIU/L-ACNC: 1.14 MIU/ML (ref 0.47–4.68)
WBC NRBC COR # BLD: 4.9 10*3/MM3 (ref 4.8–10.8)

## 2019-06-30 PROCEDURE — A9270 NON-COVERED ITEM OR SERVICE: HCPCS | Performed by: INTERNAL MEDICINE

## 2019-06-30 PROCEDURE — 63710000001 ISOSORBIDE MONONITRATE 30 MG TABLET SUSTAINED-RELEASE 24 HOUR: Performed by: INTERNAL MEDICINE

## 2019-06-30 PROCEDURE — 84443 ASSAY THYROID STIM HORMONE: CPT | Performed by: FAMILY MEDICINE

## 2019-06-30 PROCEDURE — A9270 NON-COVERED ITEM OR SERVICE: HCPCS | Performed by: FAMILY MEDICINE

## 2019-06-30 PROCEDURE — 80053 COMPREHEN METABOLIC PANEL: CPT | Performed by: FAMILY MEDICINE

## 2019-06-30 PROCEDURE — 63710000001 INSULIN LISPRO (HUMAN) PER 5 UNITS: Performed by: INTERNAL MEDICINE

## 2019-06-30 PROCEDURE — 63710000001 INSULIN DETEMIR PER 5 UNITS: Performed by: FAMILY MEDICINE

## 2019-06-30 PROCEDURE — 82962 GLUCOSE BLOOD TEST: CPT

## 2019-06-30 PROCEDURE — 25010000002 ENOXAPARIN PER 10 MG: Performed by: NURSE PRACTITIONER

## 2019-06-30 PROCEDURE — 63710000001 LAMOTRIGINE 100 MG TABLET: Performed by: INTERNAL MEDICINE

## 2019-06-30 PROCEDURE — 63710000001 LISINOPRIL 20 MG TABLET: Performed by: FAMILY MEDICINE

## 2019-06-30 PROCEDURE — 63710000001 INSULIN LISPRO (HUMAN) PER 5 UNITS: Performed by: FAMILY MEDICINE

## 2019-06-30 PROCEDURE — 63710000001 METOPROLOL TARTRATE 25 MG TABLET: Performed by: INTERNAL MEDICINE

## 2019-06-30 PROCEDURE — 63710000001 ASPIRIN 81 MG CHEWABLE TABLET: Performed by: INTERNAL MEDICINE

## 2019-06-30 PROCEDURE — 99212 OFFICE O/P EST SF 10 MIN: CPT | Performed by: NURSE PRACTITIONER

## 2019-06-30 PROCEDURE — 63710000001 ROSUVASTATIN 20 MG TABLET: Performed by: FAMILY MEDICINE

## 2019-06-30 PROCEDURE — 63710000001 PANTOPRAZOLE 40 MG TABLET DELAYED-RELEASE: Performed by: INTERNAL MEDICINE

## 2019-06-30 PROCEDURE — 63710000001 LEVETIRACETAM 500 MG TABLET: Performed by: INTERNAL MEDICINE

## 2019-06-30 PROCEDURE — 63710000001 TRIAMTERENE-HYDROCHLOROTHIAZIDE 75-50 MG TABLET: Performed by: INTERNAL MEDICINE

## 2019-06-30 PROCEDURE — G0378 HOSPITAL OBSERVATION PER HR: HCPCS

## 2019-06-30 PROCEDURE — 85025 COMPLETE CBC W/AUTO DIFF WBC: CPT | Performed by: FAMILY MEDICINE

## 2019-06-30 PROCEDURE — 63710000001 GABAPENTIN 100 MG CAPSULE: Performed by: INTERNAL MEDICINE

## 2019-06-30 PROCEDURE — 80061 LIPID PANEL: CPT | Performed by: FAMILY MEDICINE

## 2019-06-30 RX ORDER — ROSUVASTATIN CALCIUM 20 MG/1
20 TABLET, COATED ORAL NIGHTLY
Status: DISCONTINUED | OUTPATIENT
Start: 2019-06-30 | End: 2019-07-06 | Stop reason: HOSPADM

## 2019-06-30 RX ORDER — LISINOPRIL 20 MG/1
20 TABLET ORAL
Status: DISCONTINUED | OUTPATIENT
Start: 2019-06-30 | End: 2019-06-30

## 2019-06-30 RX ORDER — LISINOPRIL 10 MG/1
10 TABLET ORAL
Status: DISCONTINUED | OUTPATIENT
Start: 2019-06-30 | End: 2019-07-06 | Stop reason: HOSPADM

## 2019-06-30 RX ADMIN — INSULIN DETEMIR 50 UNITS: 100 INJECTION, SOLUTION SUBCUTANEOUS at 21:15

## 2019-06-30 RX ADMIN — ISOSORBIDE MONONITRATE 30 MG: 30 TABLET, EXTENDED RELEASE ORAL at 08:46

## 2019-06-30 RX ADMIN — ROSUVASTATIN CALCIUM 20 MG: 20 TABLET, FILM COATED ORAL at 21:00

## 2019-06-30 RX ADMIN — LISINOPRIL 10 MG: 20 TABLET ORAL at 17:44

## 2019-06-30 RX ADMIN — INSULIN DETEMIR 40 UNITS: 100 INJECTION, SOLUTION SUBCUTANEOUS at 08:46

## 2019-06-30 RX ADMIN — TRIAMTERENE AND HYDROCHLOROTHIAZIDE 0.5 TABLET: 75; 50 TABLET ORAL at 08:49

## 2019-06-30 RX ADMIN — ASPIRIN 81 MG: 81 TABLET, CHEWABLE ORAL at 08:46

## 2019-06-30 RX ADMIN — INSULIN LISPRO 8 UNITS: 100 INJECTION, SOLUTION INTRAVENOUS; SUBCUTANEOUS at 08:46

## 2019-06-30 RX ADMIN — INSULIN LISPRO 20 UNITS: 100 INJECTION, SOLUTION INTRAVENOUS; SUBCUTANEOUS at 21:16

## 2019-06-30 RX ADMIN — LAMOTRIGINE 200 MG: 100 TABLET ORAL at 21:01

## 2019-06-30 RX ADMIN — Medication 2 SPRAY: at 08:46

## 2019-06-30 RX ADMIN — GABAPENTIN 100 MG: 100 CAPSULE ORAL at 21:00

## 2019-06-30 RX ADMIN — INSULIN LISPRO 10 UNITS: 100 INJECTION, SOLUTION INTRAVENOUS; SUBCUTANEOUS at 12:28

## 2019-06-30 RX ADMIN — LEVETIRACETAM 2000 MG: 500 TABLET, FILM COATED ORAL at 21:01

## 2019-06-30 RX ADMIN — ENOXAPARIN SODIUM 110 MG: 120 INJECTION SUBCUTANEOUS at 17:43

## 2019-06-30 RX ADMIN — LEVETIRACETAM 1500 MG: 500 TABLET, FILM COATED ORAL at 08:45

## 2019-06-30 RX ADMIN — ENOXAPARIN SODIUM 110 MG: 120 INJECTION SUBCUTANEOUS at 06:54

## 2019-06-30 RX ADMIN — METOPROLOL TARTRATE 25 MG: 50 TABLET ORAL at 21:00

## 2019-06-30 RX ADMIN — LAMOTRIGINE 200 MG: 100 TABLET ORAL at 08:46

## 2019-06-30 RX ADMIN — PANTOPRAZOLE SODIUM 40 MG: 40 TABLET, DELAYED RELEASE ORAL at 06:54

## 2019-06-30 RX ADMIN — INSULIN LISPRO 12 UNITS: 100 INJECTION, SOLUTION INTRAVENOUS; SUBCUTANEOUS at 17:43

## 2019-06-30 RX ADMIN — METOPROLOL TARTRATE 25 MG: 50 TABLET ORAL at 08:46

## 2019-07-01 LAB
ALBUMIN SERPL-MCNC: 4.1 G/DL (ref 3.5–5)
ALBUMIN/GLOB SERPL: 1.3 G/DL (ref 1.1–2.5)
ALP SERPL-CCNC: 113 U/L (ref 24–120)
ALT SERPL W P-5'-P-CCNC: 49 U/L (ref 0–54)
ANION GAP SERPL CALCULATED.3IONS-SCNC: 11 MMOL/L (ref 4–13)
AST SERPL-CCNC: 30 U/L (ref 7–45)
BASOPHILS # BLD AUTO: 0.04 10*3/MM3 (ref 0–0.2)
BASOPHILS NFR BLD AUTO: 0.9 % (ref 0–2)
BILIRUB SERPL-MCNC: 0.6 MG/DL (ref 0.1–1)
BUN BLD-MCNC: 16 MG/DL (ref 5–21)
BUN/CREAT SERPL: 18.4 (ref 7–25)
CALCIUM SPEC-SCNC: 9.5 MG/DL (ref 8.4–10.4)
CHLORIDE SERPL-SCNC: 99 MMOL/L (ref 98–110)
CO2 SERPL-SCNC: 25 MMOL/L (ref 24–31)
CREAT BLD-MCNC: 0.87 MG/DL (ref 0.5–1.4)
DEPRECATED RDW RBC AUTO: 37.9 FL (ref 40–54)
EOSINOPHIL # BLD AUTO: 0.18 10*3/MM3 (ref 0–0.7)
EOSINOPHIL NFR BLD AUTO: 4.1 % (ref 0–4)
ERYTHROCYTE [DISTWIDTH] IN BLOOD BY AUTOMATED COUNT: 12.4 % (ref 12–15)
GFR SERPL CREATININE-BSD FRML MDRD: 89 ML/MIN/1.73
GLOBULIN UR ELPH-MCNC: 3.1 GM/DL
GLUCOSE BLD-MCNC: 324 MG/DL (ref 70–100)
GLUCOSE BLDC GLUCOMTR-MCNC: 270 MG/DL (ref 70–130)
GLUCOSE BLDC GLUCOMTR-MCNC: 293 MG/DL (ref 70–130)
GLUCOSE BLDC GLUCOMTR-MCNC: 299 MG/DL (ref 70–130)
GLUCOSE BLDC GLUCOMTR-MCNC: 348 MG/DL (ref 70–130)
HCT VFR BLD AUTO: 39.6 % (ref 40–52)
HCT VFR BLD AUTO: 40.8 % (ref 40–52)
HGB BLD-MCNC: 14.5 G/DL (ref 14–18)
LYMPHOCYTES # BLD AUTO: 1.53 10*3/MM3 (ref 0.72–4.86)
LYMPHOCYTES NFR BLD AUTO: 34.6 % (ref 15–45)
MCH RBC QN AUTO: 31.1 PG (ref 28–32)
MCHC RBC AUTO-ENTMCNC: 36.6 G/DL (ref 33–36)
MCV RBC AUTO: 85 FL (ref 82–95)
MONOCYTES # BLD AUTO: 0.36 10*3/MM3 (ref 0.19–1.3)
MONOCYTES NFR BLD AUTO: 8.1 % (ref 4–12)
NEUTROPHILS # BLD AUTO: 2.29 10*3/MM3 (ref 1.87–8.4)
NEUTROPHILS NFR BLD AUTO: 51.8 % (ref 39–78)
PA ADP PRP-ACNC: 188 PRU
PLATELET # BLD AUTO: 120 10*3/MM3 (ref 130–400)
PLATELET # BLD AUTO: 124 10*3/MM3 (ref 130–400)
PMV BLD AUTO: 11.5 FL (ref 6–12)
POTASSIUM BLD-SCNC: 4.6 MMOL/L (ref 3.5–5.3)
PROT SERPL-MCNC: 7.2 G/DL (ref 6.3–8.7)
RBC # BLD AUTO: 4.66 10*6/MM3 (ref 4.8–5.9)
SODIUM BLD-SCNC: 135 MMOL/L (ref 135–145)
WBC NRBC COR # BLD: 4.42 10*3/MM3 (ref 4.8–10.8)

## 2019-07-01 PROCEDURE — 85025 COMPLETE CBC W/AUTO DIFF WBC: CPT | Performed by: FAMILY MEDICINE

## 2019-07-01 PROCEDURE — 63710000001 GABAPENTIN 100 MG CAPSULE: Performed by: INTERNAL MEDICINE

## 2019-07-01 PROCEDURE — 85576 BLOOD PLATELET AGGREGATION: CPT | Performed by: THORACIC SURGERY (CARDIOTHORACIC VASCULAR SURGERY)

## 2019-07-01 PROCEDURE — 63710000001 INSULIN DETEMIR PER 5 UNITS: Performed by: FAMILY MEDICINE

## 2019-07-01 PROCEDURE — A9270 NON-COVERED ITEM OR SERVICE: HCPCS | Performed by: FAMILY MEDICINE

## 2019-07-01 PROCEDURE — 63710000001 LEVETIRACETAM 500 MG TABLET: Performed by: INTERNAL MEDICINE

## 2019-07-01 PROCEDURE — 63710000001 METOPROLOL TARTRATE 25 MG TABLET: Performed by: INTERNAL MEDICINE

## 2019-07-01 PROCEDURE — 94760 N-INVAS EAR/PLS OXIMETRY 1: CPT

## 2019-07-01 PROCEDURE — 63710000001 PANTOPRAZOLE 40 MG TABLET DELAYED-RELEASE: Performed by: INTERNAL MEDICINE

## 2019-07-01 PROCEDURE — 99232 SBSQ HOSP IP/OBS MODERATE 35: CPT | Performed by: INTERNAL MEDICINE

## 2019-07-01 PROCEDURE — A9270 NON-COVERED ITEM OR SERVICE: HCPCS | Performed by: INTERNAL MEDICINE

## 2019-07-01 PROCEDURE — 63710000001 LAMOTRIGINE 100 MG TABLET: Performed by: INTERNAL MEDICINE

## 2019-07-01 PROCEDURE — 99212 OFFICE O/P EST SF 10 MIN: CPT | Performed by: NURSE PRACTITIONER

## 2019-07-01 PROCEDURE — 63710000001 ASPIRIN 81 MG CHEWABLE TABLET: Performed by: INTERNAL MEDICINE

## 2019-07-01 PROCEDURE — 63710000001 INSULIN LISPRO (HUMAN) PER 5 UNITS: Performed by: FAMILY MEDICINE

## 2019-07-01 PROCEDURE — 63710000001 LISINOPRIL 10 MG TABLET: Performed by: FAMILY MEDICINE

## 2019-07-01 PROCEDURE — 80053 COMPREHEN METABOLIC PANEL: CPT | Performed by: FAMILY MEDICINE

## 2019-07-01 PROCEDURE — 82962 GLUCOSE BLOOD TEST: CPT

## 2019-07-01 PROCEDURE — 63710000001 ROSUVASTATIN 20 MG TABLET: Performed by: FAMILY MEDICINE

## 2019-07-01 PROCEDURE — G0378 HOSPITAL OBSERVATION PER HR: HCPCS

## 2019-07-01 PROCEDURE — 63710000001 ISOSORBIDE MONONITRATE 30 MG TABLET SUSTAINED-RELEASE 24 HOUR: Performed by: INTERNAL MEDICINE

## 2019-07-01 PROCEDURE — 25010000002 ENOXAPARIN PER 10 MG: Performed by: NURSE PRACTITIONER

## 2019-07-01 PROCEDURE — 63710000001 TRIAMTERENE-HYDROCHLOROTHIAZIDE 75-50 MG TABLET: Performed by: INTERNAL MEDICINE

## 2019-07-01 PROCEDURE — 94799 UNLISTED PULMONARY SVC/PX: CPT

## 2019-07-01 RX ADMIN — LISINOPRIL 10 MG: 20 TABLET ORAL at 08:20

## 2019-07-01 RX ADMIN — ENOXAPARIN SODIUM 110 MG: 120 INJECTION SUBCUTANEOUS at 17:36

## 2019-07-01 RX ADMIN — INSULIN LISPRO 12 UNITS: 100 INJECTION, SOLUTION INTRAVENOUS; SUBCUTANEOUS at 21:15

## 2019-07-01 RX ADMIN — INSULIN LISPRO 12 UNITS: 100 INJECTION, SOLUTION INTRAVENOUS; SUBCUTANEOUS at 08:19

## 2019-07-01 RX ADMIN — INSULIN DETEMIR 50 UNITS: 100 INJECTION, SOLUTION SUBCUTANEOUS at 08:18

## 2019-07-01 RX ADMIN — TRIAMTERENE AND HYDROCHLOROTHIAZIDE 0.5 TABLET: 75; 50 TABLET ORAL at 08:20

## 2019-07-01 RX ADMIN — LAMOTRIGINE 200 MG: 100 TABLET ORAL at 21:25

## 2019-07-01 RX ADMIN — GABAPENTIN 100 MG: 100 CAPSULE ORAL at 21:26

## 2019-07-01 RX ADMIN — INSULIN LISPRO 16 UNITS: 100 INJECTION, SOLUTION INTRAVENOUS; SUBCUTANEOUS at 12:20

## 2019-07-01 RX ADMIN — LEVETIRACETAM 2000 MG: 500 TABLET, FILM COATED ORAL at 21:25

## 2019-07-01 RX ADMIN — ROSUVASTATIN CALCIUM 20 MG: 20 TABLET, FILM COATED ORAL at 21:25

## 2019-07-01 RX ADMIN — ISOSORBIDE MONONITRATE 30 MG: 30 TABLET, EXTENDED RELEASE ORAL at 08:20

## 2019-07-01 RX ADMIN — ASPIRIN 81 MG: 81 TABLET, CHEWABLE ORAL at 08:20

## 2019-07-01 RX ADMIN — METOPROLOL TARTRATE 25 MG: 50 TABLET ORAL at 08:20

## 2019-07-01 RX ADMIN — INSULIN LISPRO 12 UNITS: 100 INJECTION, SOLUTION INTRAVENOUS; SUBCUTANEOUS at 17:35

## 2019-07-01 RX ADMIN — PANTOPRAZOLE SODIUM 40 MG: 40 TABLET, DELAYED RELEASE ORAL at 06:17

## 2019-07-01 RX ADMIN — LEVETIRACETAM 1500 MG: 500 TABLET, FILM COATED ORAL at 08:20

## 2019-07-01 RX ADMIN — INSULIN DETEMIR 54 UNITS: 100 INJECTION, SOLUTION SUBCUTANEOUS at 21:16

## 2019-07-01 RX ADMIN — LAMOTRIGINE 200 MG: 100 TABLET ORAL at 08:20

## 2019-07-01 RX ADMIN — METOPROLOL TARTRATE 25 MG: 50 TABLET ORAL at 21:25

## 2019-07-01 RX ADMIN — ENOXAPARIN SODIUM 110 MG: 120 INJECTION SUBCUTANEOUS at 06:16

## 2019-07-02 PROBLEM — D69.1 ASPIRIN-LIKE PLATELET FUNCTION DEFECT: Status: ACTIVE | Noted: 2019-07-02

## 2019-07-02 PROBLEM — E66.811 CLASS 1 OBESITY IN ADULT: Status: ACTIVE | Noted: 2019-07-02

## 2019-07-02 PROBLEM — E66.9 CLASS 1 OBESITY IN ADULT: Status: ACTIVE | Noted: 2019-07-02

## 2019-07-02 PROBLEM — Z86.73 HISTORY OF STROKE: Status: ACTIVE | Noted: 2019-07-02

## 2019-07-02 LAB
GLUCOSE BLDC GLUCOMTR-MCNC: 230 MG/DL (ref 70–130)
GLUCOSE BLDC GLUCOMTR-MCNC: 234 MG/DL (ref 70–130)
GLUCOSE BLDC GLUCOMTR-MCNC: 279 MG/DL (ref 70–130)
GLUCOSE BLDC GLUCOMTR-MCNC: 293 MG/DL (ref 70–130)
HCT VFR BLD AUTO: 41 % (ref 40–52)
PA ADP PRP-ACNC: 206 PRU
PLATELET # BLD AUTO: 121 10*3/MM3 (ref 130–400)

## 2019-07-02 PROCEDURE — 63710000001 TRIAMTERENE-HYDROCHLOROTHIAZIDE 75-50 MG TABLET: Performed by: INTERNAL MEDICINE

## 2019-07-02 PROCEDURE — 63710000001 ROSUVASTATIN 20 MG TABLET: Performed by: FAMILY MEDICINE

## 2019-07-02 PROCEDURE — A9270 NON-COVERED ITEM OR SERVICE: HCPCS | Performed by: FAMILY MEDICINE

## 2019-07-02 PROCEDURE — 99212 OFFICE O/P EST SF 10 MIN: CPT | Performed by: NURSE PRACTITIONER

## 2019-07-02 PROCEDURE — A9270 NON-COVERED ITEM OR SERVICE: HCPCS | Performed by: INTERNAL MEDICINE

## 2019-07-02 PROCEDURE — 63710000001 PRENATAL VITAMIN 27-0.8 27-0.8 MG TABLET: Performed by: NURSE PRACTITIONER

## 2019-07-02 PROCEDURE — A9270 NON-COVERED ITEM OR SERVICE: HCPCS | Performed by: NURSE PRACTITIONER

## 2019-07-02 PROCEDURE — 82962 GLUCOSE BLOOD TEST: CPT

## 2019-07-02 PROCEDURE — 63710000001 ASPIRIN 81 MG CHEWABLE TABLET: Performed by: INTERNAL MEDICINE

## 2019-07-02 PROCEDURE — 85576 BLOOD PLATELET AGGREGATION: CPT | Performed by: THORACIC SURGERY (CARDIOTHORACIC VASCULAR SURGERY)

## 2019-07-02 PROCEDURE — 63710000001 ISOSORBIDE MONONITRATE 30 MG TABLET SUSTAINED-RELEASE 24 HOUR: Performed by: INTERNAL MEDICINE

## 2019-07-02 PROCEDURE — 63710000001 METOPROLOL TARTRATE 25 MG TABLET: Performed by: INTERNAL MEDICINE

## 2019-07-02 PROCEDURE — 63710000001 LEVETIRACETAM 500 MG TABLET: Performed by: INTERNAL MEDICINE

## 2019-07-02 PROCEDURE — 25010000002 ENOXAPARIN PER 10 MG: Performed by: NURSE PRACTITIONER

## 2019-07-02 PROCEDURE — 63710000001 INSULIN LISPRO (HUMAN) PER 5 UNITS: Performed by: FAMILY MEDICINE

## 2019-07-02 PROCEDURE — 63710000001 GABAPENTIN 100 MG CAPSULE: Performed by: INTERNAL MEDICINE

## 2019-07-02 PROCEDURE — 63710000001 INSULIN DETEMIR PER 5 UNITS: Performed by: FAMILY MEDICINE

## 2019-07-02 PROCEDURE — 63710000001 LISINOPRIL 10 MG TABLET: Performed by: FAMILY MEDICINE

## 2019-07-02 PROCEDURE — 63710000001 AMIODARONE 200 MG TABLET: Performed by: NURSE PRACTITIONER

## 2019-07-02 PROCEDURE — 63710000001 PANTOPRAZOLE 40 MG TABLET DELAYED-RELEASE: Performed by: INTERNAL MEDICINE

## 2019-07-02 PROCEDURE — G0378 HOSPITAL OBSERVATION PER HR: HCPCS

## 2019-07-02 PROCEDURE — 99232 SBSQ HOSP IP/OBS MODERATE 35: CPT | Performed by: INTERNAL MEDICINE

## 2019-07-02 PROCEDURE — 63710000001 LAMOTRIGINE 100 MG TABLET: Performed by: INTERNAL MEDICINE

## 2019-07-02 RX ORDER — PRENATAL VIT/IRON FUM/FOLIC AC 27MG-0.8MG
1 TABLET ORAL DAILY
Status: DISCONTINUED | OUTPATIENT
Start: 2019-07-02 | End: 2019-07-06 | Stop reason: HOSPADM

## 2019-07-02 RX ORDER — AMIODARONE HYDROCHLORIDE 200 MG/1
400 TABLET ORAL EVERY 12 HOURS SCHEDULED
Status: DISCONTINUED | OUTPATIENT
Start: 2019-07-02 | End: 2019-07-06 | Stop reason: HOSPADM

## 2019-07-02 RX ADMIN — LEVETIRACETAM 1500 MG: 500 TABLET, FILM COATED ORAL at 09:24

## 2019-07-02 RX ADMIN — ASPIRIN 81 MG: 81 TABLET, CHEWABLE ORAL at 09:24

## 2019-07-02 RX ADMIN — ISOSORBIDE MONONITRATE 30 MG: 30 TABLET, EXTENDED RELEASE ORAL at 09:24

## 2019-07-02 RX ADMIN — PRENATAL VIT W/ FE FUMARATE-FA TAB 27-0.8 MG 1 TABLET: 27-0.8 TAB at 11:52

## 2019-07-02 RX ADMIN — INSULIN DETEMIR 56 UNITS: 100 INJECTION, SOLUTION SUBCUTANEOUS at 09:25

## 2019-07-02 RX ADMIN — LAMOTRIGINE 200 MG: 100 TABLET ORAL at 21:08

## 2019-07-02 RX ADMIN — ROSUVASTATIN CALCIUM 20 MG: 20 TABLET, FILM COATED ORAL at 21:09

## 2019-07-02 RX ADMIN — GABAPENTIN 100 MG: 100 CAPSULE ORAL at 21:08

## 2019-07-02 RX ADMIN — METOPROLOL TARTRATE 25 MG: 50 TABLET ORAL at 09:24

## 2019-07-02 RX ADMIN — TRIAMTERENE AND HYDROCHLOROTHIAZIDE 0.5 TABLET: 75; 50 TABLET ORAL at 09:23

## 2019-07-02 RX ADMIN — INSULIN DETEMIR 58 UNITS: 100 INJECTION, SOLUTION SUBCUTANEOUS at 21:13

## 2019-07-02 RX ADMIN — AMIODARONE HYDROCHLORIDE 400 MG: 200 TABLET ORAL at 12:00

## 2019-07-02 RX ADMIN — ENOXAPARIN SODIUM 110 MG: 120 INJECTION SUBCUTANEOUS at 05:36

## 2019-07-02 RX ADMIN — LEVETIRACETAM 2000 MG: 500 TABLET, FILM COATED ORAL at 21:09

## 2019-07-02 RX ADMIN — METOPROLOL TARTRATE 25 MG: 50 TABLET ORAL at 21:09

## 2019-07-02 RX ADMIN — INSULIN LISPRO 12 UNITS: 100 INJECTION, SOLUTION INTRAVENOUS; SUBCUTANEOUS at 11:53

## 2019-07-02 RX ADMIN — ENOXAPARIN SODIUM 110 MG: 120 INJECTION SUBCUTANEOUS at 19:32

## 2019-07-02 RX ADMIN — INSULIN LISPRO 8 UNITS: 100 INJECTION, SOLUTION INTRAVENOUS; SUBCUTANEOUS at 09:25

## 2019-07-02 RX ADMIN — LAMOTRIGINE 200 MG: 100 TABLET ORAL at 09:24

## 2019-07-02 RX ADMIN — LISINOPRIL 10 MG: 20 TABLET ORAL at 09:24

## 2019-07-02 RX ADMIN — PANTOPRAZOLE SODIUM 40 MG: 40 TABLET, DELAYED RELEASE ORAL at 05:37

## 2019-07-02 RX ADMIN — INSULIN LISPRO 12 UNITS: 100 INJECTION, SOLUTION INTRAVENOUS; SUBCUTANEOUS at 21:09

## 2019-07-03 ENCOUNTER — ANESTHESIA EVENT (OUTPATIENT)
Dept: PERIOP | Facility: HOSPITAL | Age: 59
End: 2019-07-03

## 2019-07-03 LAB
GLUCOSE BLDC GLUCOMTR-MCNC: 226 MG/DL (ref 70–130)
GLUCOSE BLDC GLUCOMTR-MCNC: 227 MG/DL (ref 70–130)
GLUCOSE BLDC GLUCOMTR-MCNC: 265 MG/DL (ref 70–130)
GLUCOSE BLDC GLUCOMTR-MCNC: 311 MG/DL (ref 70–130)

## 2019-07-03 PROCEDURE — 63710000001 PRENATAL VITAMIN 27-0.8 27-0.8 MG TABLET: Performed by: NURSE PRACTITIONER

## 2019-07-03 PROCEDURE — G0378 HOSPITAL OBSERVATION PER HR: HCPCS

## 2019-07-03 PROCEDURE — 63710000001 LISINOPRIL 10 MG TABLET: Performed by: FAMILY MEDICINE

## 2019-07-03 PROCEDURE — 63710000001 METOPROLOL TARTRATE 25 MG TABLET: Performed by: INTERNAL MEDICINE

## 2019-07-03 PROCEDURE — A9270 NON-COVERED ITEM OR SERVICE: HCPCS | Performed by: INTERNAL MEDICINE

## 2019-07-03 PROCEDURE — 63710000001 INSULIN LISPRO (HUMAN) PER 5 UNITS: Performed by: FAMILY MEDICINE

## 2019-07-03 PROCEDURE — 82962 GLUCOSE BLOOD TEST: CPT

## 2019-07-03 PROCEDURE — 63710000001 LEVETIRACETAM 500 MG TABLET: Performed by: INTERNAL MEDICINE

## 2019-07-03 PROCEDURE — 63710000001 GABAPENTIN 100 MG CAPSULE: Performed by: INTERNAL MEDICINE

## 2019-07-03 PROCEDURE — 63710000001 ROSUVASTATIN 20 MG TABLET: Performed by: FAMILY MEDICINE

## 2019-07-03 PROCEDURE — A9270 NON-COVERED ITEM OR SERVICE: HCPCS | Performed by: FAMILY MEDICINE

## 2019-07-03 PROCEDURE — A9270 NON-COVERED ITEM OR SERVICE: HCPCS | Performed by: NURSE PRACTITIONER

## 2019-07-03 PROCEDURE — 63710000001 PANTOPRAZOLE 40 MG TABLET DELAYED-RELEASE: Performed by: INTERNAL MEDICINE

## 2019-07-03 PROCEDURE — 63710000001 INSULIN DETEMIR PER 5 UNITS: Performed by: FAMILY MEDICINE

## 2019-07-03 PROCEDURE — 25010000002 ENOXAPARIN PER 10 MG: Performed by: NURSE PRACTITIONER

## 2019-07-03 PROCEDURE — 99212 OFFICE O/P EST SF 10 MIN: CPT | Performed by: NURSE PRACTITIONER

## 2019-07-03 PROCEDURE — 63710000001 AMIODARONE 200 MG TABLET: Performed by: NURSE PRACTITIONER

## 2019-07-03 PROCEDURE — 63710000001 ISOSORBIDE MONONITRATE 30 MG TABLET SUSTAINED-RELEASE 24 HOUR: Performed by: INTERNAL MEDICINE

## 2019-07-03 PROCEDURE — 63710000001 LAMOTRIGINE 100 MG TABLET: Performed by: INTERNAL MEDICINE

## 2019-07-03 PROCEDURE — 63710000001 TRIAMTERENE-HYDROCHLOROTHIAZIDE 75-50 MG TABLET: Performed by: INTERNAL MEDICINE

## 2019-07-03 PROCEDURE — 63710000001 ASPIRIN 81 MG CHEWABLE TABLET: Performed by: INTERNAL MEDICINE

## 2019-07-03 PROCEDURE — 99232 SBSQ HOSP IP/OBS MODERATE 35: CPT | Performed by: INTERNAL MEDICINE

## 2019-07-03 RX ADMIN — INSULIN LISPRO 20 UNITS: 100 INJECTION, SOLUTION INTRAVENOUS; SUBCUTANEOUS at 17:16

## 2019-07-03 RX ADMIN — LISINOPRIL 10 MG: 20 TABLET ORAL at 08:42

## 2019-07-03 RX ADMIN — INSULIN DETEMIR 60 UNITS: 100 INJECTION, SOLUTION SUBCUTANEOUS at 21:04

## 2019-07-03 RX ADMIN — LAMOTRIGINE 200 MG: 100 TABLET ORAL at 08:43

## 2019-07-03 RX ADMIN — INSULIN LISPRO 8 UNITS: 100 INJECTION, SOLUTION INTRAVENOUS; SUBCUTANEOUS at 12:00

## 2019-07-03 RX ADMIN — METOPROLOL TARTRATE 25 MG: 50 TABLET ORAL at 21:03

## 2019-07-03 RX ADMIN — LAMOTRIGINE 200 MG: 100 TABLET ORAL at 21:11

## 2019-07-03 RX ADMIN — Medication 2 SPRAY: at 08:40

## 2019-07-03 RX ADMIN — ENOXAPARIN SODIUM 110 MG: 120 INJECTION SUBCUTANEOUS at 06:13

## 2019-07-03 RX ADMIN — PRENATAL VIT W/ FE FUMARATE-FA TAB 27-0.8 MG 1 TABLET: 27-0.8 TAB at 08:42

## 2019-07-03 RX ADMIN — INSULIN DETEMIR 58 UNITS: 100 INJECTION, SOLUTION SUBCUTANEOUS at 08:43

## 2019-07-03 RX ADMIN — AMIODARONE HYDROCHLORIDE 400 MG: 200 TABLET ORAL at 12:00

## 2019-07-03 RX ADMIN — INSULIN LISPRO 8 UNITS: 100 INJECTION, SOLUTION INTRAVENOUS; SUBCUTANEOUS at 17:15

## 2019-07-03 RX ADMIN — PANTOPRAZOLE SODIUM 40 MG: 40 TABLET, DELAYED RELEASE ORAL at 06:13

## 2019-07-03 RX ADMIN — INSULIN LISPRO 8 UNITS: 100 INJECTION, SOLUTION INTRAVENOUS; SUBCUTANEOUS at 08:43

## 2019-07-03 RX ADMIN — INSULIN LISPRO 8 UNITS: 100 INJECTION, SOLUTION INTRAVENOUS; SUBCUTANEOUS at 21:05

## 2019-07-03 RX ADMIN — AMIODARONE HYDROCHLORIDE 400 MG: 200 TABLET ORAL at 21:02

## 2019-07-03 RX ADMIN — LEVETIRACETAM 1500 MG: 500 TABLET, FILM COATED ORAL at 08:42

## 2019-07-03 RX ADMIN — GABAPENTIN 100 MG: 100 CAPSULE ORAL at 21:02

## 2019-07-03 RX ADMIN — METOPROLOL TARTRATE 25 MG: 50 TABLET ORAL at 08:42

## 2019-07-03 RX ADMIN — ISOSORBIDE MONONITRATE 30 MG: 30 TABLET, EXTENDED RELEASE ORAL at 08:43

## 2019-07-03 RX ADMIN — AMIODARONE HYDROCHLORIDE 400 MG: 200 TABLET ORAL at 00:28

## 2019-07-03 RX ADMIN — ENOXAPARIN SODIUM 110 MG: 120 INJECTION SUBCUTANEOUS at 17:15

## 2019-07-03 RX ADMIN — ASPIRIN 81 MG: 81 TABLET, CHEWABLE ORAL at 08:43

## 2019-07-03 RX ADMIN — ROSUVASTATIN CALCIUM 20 MG: 20 TABLET, FILM COATED ORAL at 21:03

## 2019-07-03 RX ADMIN — INSULIN LISPRO 20 UNITS: 100 INJECTION, SOLUTION INTRAVENOUS; SUBCUTANEOUS at 12:01

## 2019-07-03 RX ADMIN — TRIAMTERENE AND HYDROCHLOROTHIAZIDE 0.5 TABLET: 75; 50 TABLET ORAL at 08:46

## 2019-07-03 RX ADMIN — LEVETIRACETAM 2000 MG: 500 TABLET, FILM COATED ORAL at 21:03

## 2019-07-04 LAB
ALBUMIN SERPL-MCNC: 4.6 G/DL (ref 3.5–5)
ALBUMIN/GLOB SERPL: 1.5 G/DL (ref 1.1–2.5)
ALP SERPL-CCNC: 114 U/L (ref 24–120)
ALT SERPL W P-5'-P-CCNC: 42 U/L (ref 0–54)
ANION GAP SERPL CALCULATED.3IONS-SCNC: 10 MMOL/L (ref 4–13)
AST SERPL-CCNC: 25 U/L (ref 7–45)
BASOPHILS # BLD AUTO: 0.04 10*3/MM3 (ref 0–0.2)
BASOPHILS NFR BLD AUTO: 0.7 % (ref 0–2)
BILIRUB SERPL-MCNC: 0.7 MG/DL (ref 0.1–1)
BUN BLD-MCNC: 21 MG/DL (ref 5–21)
BUN/CREAT SERPL: 18.6 (ref 7–25)
CALCIUM SPEC-SCNC: 9.9 MG/DL (ref 8.4–10.4)
CHLORIDE SERPL-SCNC: 99 MMOL/L (ref 98–110)
CO2 SERPL-SCNC: 25 MMOL/L (ref 24–31)
CREAT BLD-MCNC: 1.13 MG/DL (ref 0.5–1.4)
DEPRECATED RDW RBC AUTO: 38.5 FL (ref 40–54)
EOSINOPHIL # BLD AUTO: 0.18 10*3/MM3 (ref 0–0.7)
EOSINOPHIL NFR BLD AUTO: 3 % (ref 0–4)
ERYTHROCYTE [DISTWIDTH] IN BLOOD BY AUTOMATED COUNT: 12.2 % (ref 12–15)
GFR SERPL CREATININE-BSD FRML MDRD: 66 ML/MIN/1.73
GLOBULIN UR ELPH-MCNC: 3.1 GM/DL
GLUCOSE BLD-MCNC: 293 MG/DL (ref 70–100)
GLUCOSE BLDC GLUCOMTR-MCNC: 181 MG/DL (ref 70–130)
GLUCOSE BLDC GLUCOMTR-MCNC: 272 MG/DL (ref 70–130)
GLUCOSE BLDC GLUCOMTR-MCNC: 277 MG/DL (ref 70–130)
GLUCOSE BLDC GLUCOMTR-MCNC: 284 MG/DL (ref 70–130)
HCT VFR BLD AUTO: 42.6 % (ref 40–52)
HGB BLD-MCNC: 14.7 G/DL (ref 14–18)
IMM GRANULOCYTES # BLD AUTO: 0.06 10*3/MM3 (ref 0–0.05)
IMM GRANULOCYTES NFR BLD AUTO: 1 % (ref 0–5)
LYMPHOCYTES # BLD AUTO: 1.94 10*3/MM3 (ref 0.72–4.86)
LYMPHOCYTES NFR BLD AUTO: 32.7 % (ref 15–45)
MCH RBC QN AUTO: 29.6 PG (ref 28–32)
MCHC RBC AUTO-ENTMCNC: 34.5 G/DL (ref 33–36)
MCV RBC AUTO: 85.9 FL (ref 82–95)
MONOCYTES # BLD AUTO: 0.57 10*3/MM3 (ref 0.19–1.3)
MONOCYTES NFR BLD AUTO: 9.6 % (ref 4–12)
NEUTROPHILS # BLD AUTO: 3.15 10*3/MM3 (ref 1.87–8.4)
NEUTROPHILS NFR BLD AUTO: 53 % (ref 39–78)
NRBC BLD AUTO-RTO: 0 /100 WBC (ref 0–0.2)
PLATELET # BLD AUTO: 113 10*3/MM3 (ref 130–400)
PMV BLD AUTO: 11.4 FL (ref 6–12)
POTASSIUM BLD-SCNC: 5.2 MMOL/L (ref 3.5–5.3)
PROT SERPL-MCNC: 7.7 G/DL (ref 6.3–8.7)
RBC # BLD AUTO: 4.96 10*6/MM3 (ref 4.8–5.9)
SODIUM BLD-SCNC: 134 MMOL/L (ref 135–145)
WBC NRBC COR # BLD: 5.94 10*3/MM3 (ref 4.8–10.8)

## 2019-07-04 PROCEDURE — 63710000001 ROSUVASTATIN 20 MG TABLET: Performed by: FAMILY MEDICINE

## 2019-07-04 PROCEDURE — 25010000002 ENOXAPARIN PER 10 MG: Performed by: NURSE PRACTITIONER

## 2019-07-04 PROCEDURE — 63710000001 INSULIN DETEMIR PER 5 UNITS: Performed by: FAMILY MEDICINE

## 2019-07-04 PROCEDURE — A9270 NON-COVERED ITEM OR SERVICE: HCPCS | Performed by: FAMILY MEDICINE

## 2019-07-04 PROCEDURE — 63710000001 PANTOPRAZOLE 40 MG TABLET DELAYED-RELEASE: Performed by: INTERNAL MEDICINE

## 2019-07-04 PROCEDURE — A9270 NON-COVERED ITEM OR SERVICE: HCPCS | Performed by: INTERNAL MEDICINE

## 2019-07-04 PROCEDURE — 63710000001 INSULIN LISPRO (HUMAN) PER 5 UNITS: Performed by: FAMILY MEDICINE

## 2019-07-04 PROCEDURE — 63710000001 LEVETIRACETAM 500 MG TABLET: Performed by: INTERNAL MEDICINE

## 2019-07-04 PROCEDURE — 63710000001 ASPIRIN 81 MG CHEWABLE TABLET: Performed by: INTERNAL MEDICINE

## 2019-07-04 PROCEDURE — 63710000001 LISINOPRIL 10 MG TABLET: Performed by: FAMILY MEDICINE

## 2019-07-04 PROCEDURE — 85025 COMPLETE CBC W/AUTO DIFF WBC: CPT | Performed by: THORACIC SURGERY (CARDIOTHORACIC VASCULAR SURGERY)

## 2019-07-04 PROCEDURE — 63710000001 METOPROLOL TARTRATE 25 MG TABLET: Performed by: INTERNAL MEDICINE

## 2019-07-04 PROCEDURE — A9270 NON-COVERED ITEM OR SERVICE: HCPCS | Performed by: NURSE PRACTITIONER

## 2019-07-04 PROCEDURE — 63710000001 AMIODARONE 200 MG TABLET: Performed by: NURSE PRACTITIONER

## 2019-07-04 PROCEDURE — 63710000001 LAMOTRIGINE 100 MG TABLET: Performed by: INTERNAL MEDICINE

## 2019-07-04 PROCEDURE — 80053 COMPREHEN METABOLIC PANEL: CPT | Performed by: NURSE PRACTITIONER

## 2019-07-04 PROCEDURE — 63710000001 GABAPENTIN 100 MG CAPSULE: Performed by: INTERNAL MEDICINE

## 2019-07-04 PROCEDURE — 82962 GLUCOSE BLOOD TEST: CPT

## 2019-07-04 PROCEDURE — 63710000001 ISOSORBIDE MONONITRATE 30 MG TABLET SUSTAINED-RELEASE 24 HOUR: Performed by: INTERNAL MEDICINE

## 2019-07-04 PROCEDURE — G0378 HOSPITAL OBSERVATION PER HR: HCPCS

## 2019-07-04 PROCEDURE — 63710000001 TRIAMTERENE-HYDROCHLOROTHIAZIDE 75-50 MG TABLET: Performed by: INTERNAL MEDICINE

## 2019-07-04 PROCEDURE — 99212 OFFICE O/P EST SF 10 MIN: CPT | Performed by: NURSE PRACTITIONER

## 2019-07-04 PROCEDURE — 63710000001 PRENATAL VITAMIN 27-0.8 27-0.8 MG TABLET: Performed by: NURSE PRACTITIONER

## 2019-07-04 PROCEDURE — 99232 SBSQ HOSP IP/OBS MODERATE 35: CPT | Performed by: INTERNAL MEDICINE

## 2019-07-04 RX ADMIN — TRIAMTERENE AND HYDROCHLOROTHIAZIDE 0.5 TABLET: 75; 50 TABLET ORAL at 08:25

## 2019-07-04 RX ADMIN — ENOXAPARIN SODIUM 110 MG: 120 INJECTION SUBCUTANEOUS at 06:05

## 2019-07-04 RX ADMIN — LISINOPRIL 10 MG: 20 TABLET ORAL at 08:26

## 2019-07-04 RX ADMIN — ASPIRIN 81 MG: 81 TABLET, CHEWABLE ORAL at 08:27

## 2019-07-04 RX ADMIN — ROSUVASTATIN CALCIUM 20 MG: 20 TABLET, FILM COATED ORAL at 20:53

## 2019-07-04 RX ADMIN — INSULIN LISPRO 8 UNITS: 100 INJECTION, SOLUTION INTRAVENOUS; SUBCUTANEOUS at 20:52

## 2019-07-04 RX ADMIN — ENOXAPARIN SODIUM 110 MG: 120 INJECTION SUBCUTANEOUS at 16:44

## 2019-07-04 RX ADMIN — INSULIN LISPRO 20 UNITS: 100 INJECTION, SOLUTION INTRAVENOUS; SUBCUTANEOUS at 08:00

## 2019-07-04 RX ADMIN — PRENATAL VIT W/ FE FUMARATE-FA TAB 27-0.8 MG 1 TABLET: 27-0.8 TAB at 08:26

## 2019-07-04 RX ADMIN — PANTOPRAZOLE SODIUM 40 MG: 40 TABLET, DELAYED RELEASE ORAL at 06:04

## 2019-07-04 RX ADMIN — LEVETIRACETAM 1500 MG: 500 TABLET, FILM COATED ORAL at 08:26

## 2019-07-04 RX ADMIN — INSULIN DETEMIR 64 UNITS: 100 INJECTION, SOLUTION SUBCUTANEOUS at 20:52

## 2019-07-04 RX ADMIN — LEVETIRACETAM 2000 MG: 500 TABLET, FILM COATED ORAL at 20:53

## 2019-07-04 RX ADMIN — AMIODARONE HYDROCHLORIDE 400 MG: 200 TABLET ORAL at 20:52

## 2019-07-04 RX ADMIN — LAMOTRIGINE 200 MG: 100 TABLET ORAL at 08:27

## 2019-07-04 RX ADMIN — INSULIN LISPRO 25 UNITS: 100 INJECTION, SOLUTION INTRAVENOUS; SUBCUTANEOUS at 11:51

## 2019-07-04 RX ADMIN — GABAPENTIN 100 MG: 100 CAPSULE ORAL at 20:52

## 2019-07-04 RX ADMIN — METOPROLOL TARTRATE 25 MG: 50 TABLET ORAL at 20:53

## 2019-07-04 RX ADMIN — AMIODARONE HYDROCHLORIDE 400 MG: 200 TABLET ORAL at 08:27

## 2019-07-04 RX ADMIN — INSULIN LISPRO 25 UNITS: 100 INJECTION, SOLUTION INTRAVENOUS; SUBCUTANEOUS at 16:42

## 2019-07-04 RX ADMIN — INSULIN LISPRO 4 UNITS: 100 INJECTION, SOLUTION INTRAVENOUS; SUBCUTANEOUS at 16:42

## 2019-07-04 RX ADMIN — INSULIN LISPRO 12 UNITS: 100 INJECTION, SOLUTION INTRAVENOUS; SUBCUTANEOUS at 11:50

## 2019-07-04 RX ADMIN — ISOSORBIDE MONONITRATE 30 MG: 30 TABLET, EXTENDED RELEASE ORAL at 08:27

## 2019-07-04 RX ADMIN — LAMOTRIGINE 200 MG: 100 TABLET ORAL at 20:53

## 2019-07-04 RX ADMIN — INSULIN LISPRO 12 UNITS: 100 INJECTION, SOLUTION INTRAVENOUS; SUBCUTANEOUS at 08:27

## 2019-07-04 RX ADMIN — METOPROLOL TARTRATE 25 MG: 50 TABLET ORAL at 08:26

## 2019-07-04 RX ADMIN — INSULIN DETEMIR 60 UNITS: 100 INJECTION, SOLUTION SUBCUTANEOUS at 08:27

## 2019-07-05 ENCOUNTER — ANESTHESIA (OUTPATIENT)
Dept: PERIOP | Facility: HOSPITAL | Age: 59
End: 2019-07-05

## 2019-07-05 LAB
ABO GROUP BLD: NORMAL
ANION GAP SERPL CALCULATED.3IONS-SCNC: 10 MMOL/L (ref 4–13)
APTT PPP: 39.1 SECONDS (ref 24.1–35)
BASOPHILS # BLD AUTO: 0.05 10*3/MM3 (ref 0–0.2)
BASOPHILS NFR BLD AUTO: 0.8 % (ref 0–2)
BLD GP AB SCN SERPL QL: NEGATIVE
BUN BLD-MCNC: 20 MG/DL (ref 5–21)
BUN/CREAT SERPL: 18.9 (ref 7–25)
CALCIUM SPEC-SCNC: 10 MG/DL (ref 8.4–10.4)
CHLORIDE SERPL-SCNC: 99 MMOL/L (ref 98–110)
CO2 SERPL-SCNC: 25 MMOL/L (ref 24–31)
CREAT BLD-MCNC: 1.06 MG/DL (ref 0.5–1.4)
DEPRECATED RDW RBC AUTO: 39.1 FL (ref 40–54)
EOSINOPHIL # BLD AUTO: 0.21 10*3/MM3 (ref 0–0.7)
EOSINOPHIL NFR BLD AUTO: 3.3 % (ref 0–4)
ERYTHROCYTE [DISTWIDTH] IN BLOOD BY AUTOMATED COUNT: 12.4 % (ref 12–15)
GFR SERPL CREATININE-BSD FRML MDRD: 71 ML/MIN/1.73
GLUCOSE BLD-MCNC: 180 MG/DL (ref 70–100)
GLUCOSE BLDC GLUCOMTR-MCNC: 160 MG/DL (ref 70–130)
GLUCOSE BLDC GLUCOMTR-MCNC: 178 MG/DL (ref 70–130)
GLUCOSE BLDC GLUCOMTR-MCNC: 193 MG/DL (ref 70–130)
GLUCOSE BLDC GLUCOMTR-MCNC: 202 MG/DL (ref 70–130)
GLUCOSE BLDC GLUCOMTR-MCNC: 227 MG/DL (ref 70–130)
GLUCOSE BLDC GLUCOMTR-MCNC: 228 MG/DL (ref 70–130)
GLUCOSE BLDC GLUCOMTR-MCNC: 238 MG/DL (ref 70–130)
GLUCOSE BLDC GLUCOMTR-MCNC: 240 MG/DL (ref 70–130)
GLUCOSE BLDC GLUCOMTR-MCNC: 269 MG/DL (ref 70–130)
HCT VFR BLD AUTO: 42.6 % (ref 40–52)
HCT VFR BLD AUTO: 43.6 % (ref 40–52)
HGB BLD-MCNC: 15 G/DL (ref 14–18)
INR PPP: 0.97 (ref 0.91–1.09)
LYMPHOCYTES # BLD AUTO: 2.05 10*3/MM3 (ref 0.72–4.86)
LYMPHOCYTES NFR BLD AUTO: 32.2 % (ref 15–45)
MCH RBC QN AUTO: 29.7 PG (ref 28–32)
MCHC RBC AUTO-ENTMCNC: 34.4 G/DL (ref 33–36)
MCV RBC AUTO: 86.3 FL (ref 82–95)
MONOCYTES # BLD AUTO: 0.54 10*3/MM3 (ref 0.19–1.3)
MONOCYTES NFR BLD AUTO: 8.5 % (ref 4–12)
NEUTROPHILS # BLD AUTO: 3.44 10*3/MM3 (ref 1.87–8.4)
NEUTROPHILS NFR BLD AUTO: 54.1 % (ref 39–78)
PA ADP PRP-ACNC: 206 PRU
PLATELET # BLD AUTO: 119 10*3/MM3 (ref 130–400)
PLATELET # BLD AUTO: 119 10*3/MM3 (ref 130–400)
PMV BLD AUTO: 11.8 FL (ref 6–12)
POTASSIUM BLD-SCNC: 5 MMOL/L (ref 3.5–5.3)
PROTHROMBIN TIME: 13.2 SECONDS (ref 11.9–14.6)
RBC # BLD AUTO: 5.05 10*6/MM3 (ref 4.8–5.9)
RH BLD: POSITIVE
SODIUM BLD-SCNC: 134 MMOL/L (ref 135–145)
T&S EXPIRATION DATE: NORMAL
WBC NRBC COR # BLD: 6.36 10*3/MM3 (ref 4.8–10.8)

## 2019-07-05 PROCEDURE — 86901 BLOOD TYPING SEROLOGIC RH(D): CPT | Performed by: THORACIC SURGERY (CARDIOTHORACIC VASCULAR SURGERY)

## 2019-07-05 PROCEDURE — 63710000001 PANTOPRAZOLE 40 MG TABLET DELAYED-RELEASE: Performed by: INTERNAL MEDICINE

## 2019-07-05 PROCEDURE — 86900 BLOOD TYPING SEROLOGIC ABO: CPT

## 2019-07-05 PROCEDURE — 63710000001 ASPIRIN 81 MG CHEWABLE TABLET: Performed by: INTERNAL MEDICINE

## 2019-07-05 PROCEDURE — 63710000001 LEVETIRACETAM 500 MG TABLET: Performed by: INTERNAL MEDICINE

## 2019-07-05 PROCEDURE — 63710000001 INSULIN LISPRO (HUMAN) PER 5 UNITS: Performed by: FAMILY MEDICINE

## 2019-07-05 PROCEDURE — A9270 NON-COVERED ITEM OR SERVICE: HCPCS | Performed by: FAMILY MEDICINE

## 2019-07-05 PROCEDURE — 99212 OFFICE O/P EST SF 10 MIN: CPT | Performed by: NURSE PRACTITIONER

## 2019-07-05 PROCEDURE — 85025 COMPLETE CBC W/AUTO DIFF WBC: CPT | Performed by: THORACIC SURGERY (CARDIOTHORACIC VASCULAR SURGERY)

## 2019-07-05 PROCEDURE — 80048 BASIC METABOLIC PNL TOTAL CA: CPT | Performed by: NURSE PRACTITIONER

## 2019-07-05 PROCEDURE — A9270 NON-COVERED ITEM OR SERVICE: HCPCS | Performed by: INTERNAL MEDICINE

## 2019-07-05 PROCEDURE — 82962 GLUCOSE BLOOD TEST: CPT

## 2019-07-05 PROCEDURE — A9270 NON-COVERED ITEM OR SERVICE: HCPCS | Performed by: NURSE PRACTITIONER

## 2019-07-05 PROCEDURE — 86920 COMPATIBILITY TEST SPIN: CPT

## 2019-07-05 PROCEDURE — 99232 SBSQ HOSP IP/OBS MODERATE 35: CPT | Performed by: INTERNAL MEDICINE

## 2019-07-05 PROCEDURE — 63710000001 PRENATAL VITAMIN 27-0.8 27-0.8 MG TABLET: Performed by: NURSE PRACTITIONER

## 2019-07-05 PROCEDURE — 63710000001 METOPROLOL TARTRATE 25 MG TABLET: Performed by: INTERNAL MEDICINE

## 2019-07-05 PROCEDURE — 63710000001 LAMOTRIGINE 100 MG TABLET: Performed by: INTERNAL MEDICINE

## 2019-07-05 PROCEDURE — 63710000001 TRIAMTERENE-HYDROCHLOROTHIAZIDE 75-50 MG TABLET: Performed by: INTERNAL MEDICINE

## 2019-07-05 PROCEDURE — 86900 BLOOD TYPING SEROLOGIC ABO: CPT | Performed by: THORACIC SURGERY (CARDIOTHORACIC VASCULAR SURGERY)

## 2019-07-05 PROCEDURE — 63710000001 GABAPENTIN 100 MG CAPSULE: Performed by: INTERNAL MEDICINE

## 2019-07-05 PROCEDURE — 85610 PROTHROMBIN TIME: CPT | Performed by: THORACIC SURGERY (CARDIOTHORACIC VASCULAR SURGERY)

## 2019-07-05 PROCEDURE — 63710000001 AMIODARONE 200 MG TABLET: Performed by: NURSE PRACTITIONER

## 2019-07-05 PROCEDURE — 85576 BLOOD PLATELET AGGREGATION: CPT | Performed by: THORACIC SURGERY (CARDIOTHORACIC VASCULAR SURGERY)

## 2019-07-05 PROCEDURE — 86901 BLOOD TYPING SEROLOGIC RH(D): CPT

## 2019-07-05 PROCEDURE — G0378 HOSPITAL OBSERVATION PER HR: HCPCS

## 2019-07-05 PROCEDURE — 86850 RBC ANTIBODY SCREEN: CPT | Performed by: THORACIC SURGERY (CARDIOTHORACIC VASCULAR SURGERY)

## 2019-07-05 PROCEDURE — 85730 THROMBOPLASTIN TIME PARTIAL: CPT | Performed by: THORACIC SURGERY (CARDIOTHORACIC VASCULAR SURGERY)

## 2019-07-05 PROCEDURE — 63710000001 LISINOPRIL 10 MG TABLET: Performed by: FAMILY MEDICINE

## 2019-07-05 PROCEDURE — 63710000001 ROSUVASTATIN 20 MG TABLET: Performed by: FAMILY MEDICINE

## 2019-07-05 PROCEDURE — 25010000002 ENOXAPARIN PER 10 MG: Performed by: NURSE PRACTITIONER

## 2019-07-05 PROCEDURE — 63710000001 INSULIN DETEMIR PER 5 UNITS: Performed by: FAMILY MEDICINE

## 2019-07-05 PROCEDURE — 63710000001 ISOSORBIDE MONONITRATE 30 MG TABLET SUSTAINED-RELEASE 24 HOUR: Performed by: INTERNAL MEDICINE

## 2019-07-05 RX ORDER — SODIUM CHLORIDE 0.9 % (FLUSH) 0.9 %
3-10 SYRINGE (ML) INJECTION AS NEEDED
Status: DISCONTINUED | OUTPATIENT
Start: 2019-07-05 | End: 2019-07-06 | Stop reason: HOSPADM

## 2019-07-05 RX ORDER — SODIUM CHLORIDE 0.9 % (FLUSH) 0.9 %
3 SYRINGE (ML) INJECTION EVERY 12 HOURS SCHEDULED
Status: DISCONTINUED | OUTPATIENT
Start: 2019-07-05 | End: 2019-07-06 | Stop reason: HOSPADM

## 2019-07-05 RX ORDER — DEXTROSE MONOHYDRATE 25 G/50ML
25-50 INJECTION, SOLUTION INTRAVENOUS
Status: DISCONTINUED | OUTPATIENT
Start: 2019-07-05 | End: 2019-07-06 | Stop reason: HOSPADM

## 2019-07-05 RX ORDER — CEFAZOLIN SODIUM 2 G/50ML
2 SOLUTION INTRAVENOUS EVERY 8 HOURS
Status: DISCONTINUED | OUTPATIENT
Start: 2019-07-05 | End: 2019-07-05

## 2019-07-05 RX ADMIN — ENOXAPARIN SODIUM 110 MG: 120 INJECTION SUBCUTANEOUS at 18:38

## 2019-07-05 RX ADMIN — INSULIN LISPRO 25 UNITS: 100 INJECTION, SOLUTION INTRAVENOUS; SUBCUTANEOUS at 12:04

## 2019-07-05 RX ADMIN — INSULIN LISPRO 12 UNITS: 100 INJECTION, SOLUTION INTRAVENOUS; SUBCUTANEOUS at 12:04

## 2019-07-05 RX ADMIN — SODIUM CHLORIDE 1 UNITS/HR: 9 INJECTION, SOLUTION INTRAVENOUS at 14:14

## 2019-07-05 RX ADMIN — LEVETIRACETAM 2000 MG: 500 TABLET, FILM COATED ORAL at 20:50

## 2019-07-05 RX ADMIN — INSULIN LISPRO 25 UNITS: 100 INJECTION, SOLUTION INTRAVENOUS; SUBCUTANEOUS at 08:34

## 2019-07-05 RX ADMIN — LAMOTRIGINE 200 MG: 100 TABLET ORAL at 20:50

## 2019-07-05 RX ADMIN — TRIAMTERENE AND HYDROCHLOROTHIAZIDE 0.5 TABLET: 75; 50 TABLET ORAL at 08:36

## 2019-07-05 RX ADMIN — PANTOPRAZOLE SODIUM 40 MG: 40 TABLET, DELAYED RELEASE ORAL at 06:26

## 2019-07-05 RX ADMIN — METOPROLOL TARTRATE 25 MG: 50 TABLET ORAL at 08:37

## 2019-07-05 RX ADMIN — LAMOTRIGINE 200 MG: 100 TABLET ORAL at 08:36

## 2019-07-05 RX ADMIN — ISOSORBIDE MONONITRATE 30 MG: 30 TABLET, EXTENDED RELEASE ORAL at 08:37

## 2019-07-05 RX ADMIN — LEVETIRACETAM 1500 MG: 500 TABLET, FILM COATED ORAL at 08:36

## 2019-07-05 RX ADMIN — SODIUM CHLORIDE, PRESERVATIVE FREE 3 ML: 5 INJECTION INTRAVENOUS at 20:49

## 2019-07-05 RX ADMIN — AMIODARONE HYDROCHLORIDE 400 MG: 200 TABLET ORAL at 20:50

## 2019-07-05 RX ADMIN — ROSUVASTATIN CALCIUM 20 MG: 20 TABLET, FILM COATED ORAL at 20:49

## 2019-07-05 RX ADMIN — AMIODARONE HYDROCHLORIDE 400 MG: 200 TABLET ORAL at 08:37

## 2019-07-05 RX ADMIN — GABAPENTIN 100 MG: 100 CAPSULE ORAL at 20:52

## 2019-07-05 RX ADMIN — LISINOPRIL 10 MG: 20 TABLET ORAL at 08:37

## 2019-07-05 RX ADMIN — Medication 2 SPRAY: at 08:33

## 2019-07-05 RX ADMIN — INSULIN LISPRO 8 UNITS: 100 INJECTION, SOLUTION INTRAVENOUS; SUBCUTANEOUS at 08:33

## 2019-07-05 RX ADMIN — METOPROLOL TARTRATE 25 MG: 50 TABLET ORAL at 20:49

## 2019-07-05 RX ADMIN — INSULIN DETEMIR 32 UNITS: 100 INJECTION, SOLUTION SUBCUTANEOUS at 21:07

## 2019-07-05 RX ADMIN — ASPIRIN 81 MG: 81 TABLET, CHEWABLE ORAL at 08:36

## 2019-07-05 RX ADMIN — ENOXAPARIN SODIUM 110 MG: 120 INJECTION SUBCUTANEOUS at 06:26

## 2019-07-05 RX ADMIN — INSULIN DETEMIR 64 UNITS: 100 INJECTION, SOLUTION SUBCUTANEOUS at 08:32

## 2019-07-05 RX ADMIN — PRENATAL VIT W/ FE FUMARATE-FA TAB 27-0.8 MG 1 TABLET: 27-0.8 TAB at 08:37

## 2019-07-05 NOTE — ANESTHESIA PREPROCEDURE EVALUATION
Anesthesia Evaluation     Patient summary reviewed   no history of anesthetic complications:  NPO Solid Status: > 8 hours  NPO Liquid Status: > 8 hours           Airway   Mallampati: II  TM distance: >3 FB  Neck ROM: full  Dental          Pulmonary - normal exam    breath sounds clear to auscultation  (+) a smoker (quit 1993) Former, asthma, shortness of breath, sleep apnea (not compliant with CPAP),   (-) recent URI  Cardiovascular   Exercise tolerance: poor (<4 METS)    ECG reviewed  Patient on routine beta blocker and Beta blocker given within 24 hours of surgery  Rhythm: regular  Rate: normal    (+) hypertension, valvular problems/murmurs, past MI  <3 months, CAD, cardiac stents (Multiple stents, with balloon angioplasty last week) angina with exertion, murmur, PVD, hyperlipidemia,  carotid artery disease (Left worse than right)  (-) pacemaker    ROS comment: TTE 6/28/19 - ·Left ventricular wall thickness is consistent with moderate concentric hypertrophy.  ·Estimated EF = 65%.  ·Left ventricular diastolic dysfunction.  ·Mild to moderate aortic valve regurgitation is present.  ·Mild aortic valve stenosis is present. Aortic valve leaflets not well visualized  ·No evidence of pulmonary hypertension is present.    Cath 6/26/19 - Conclusion of cardiac catheterization     Left main coronary arteries normal  Left anterior descending coronary artery has patent stents  The distal edge of the stent in the mid left anterior descending coronary artery has approximately 60% stenosis  Highly abnormal fractional flow reserve of this at 0.78 without adenosine stress.  PTCA done of the segment.  Despite multiple attempts and use of guide liner could not advance the stent due to earlier implanted stents.  Left circumflex coronary artery is codominant and large  The proximal portion of the first obtuse marginal branch has a 60% stenosis with normal fractional flow reserve above 0.85.  Right coronary artery is large and  codominant without any high-grade lesions.     Left ventricular end-diastolic pressure is mildly elevated to 15 mmHg.  No gradient across aortic valve on pullback.  Left ventriculography shows a hyperdynamic left ventricle with ejection fraction of 75%.     Percutaneous coronary intervention     XB 3.5 guide advised used for fractional flow reserve of the obtuse marginal branch as well as of the left anterior descending coronary artery  Then we did try to advance a 2.5 mm stent.  We used 2 different size stents and could not cross  We used a guide liner and does not did not have placed cross across the stent.  We then did balloon angioplasty using 2.0 mm balloon.  Stenosis was reduced from 60% down to less than 10%.  BEVERLEY-3 flow before and after procedure.    Neuro/Psych  (+) seizures (last one 5 months ago), CVA (2011),     (-) TIA  GI/Hepatic/Renal/Endo    (+) morbid obesity, GERD,  liver disease fatty liver disease, diabetes mellitus using insulin,   (-) no renal disease, hypothyroidism, hyperthyroidism    Musculoskeletal     Abdominal   (+) obese,    Substance History      OB/GYN          Other        ROS/Med Hx Other: No history of esophageal surgeries or dysphagia                Anesthesia Plan    ASA 4 - emergent   (Patient has been experiencing chest pain.  Initially to have CABG on 7/4/19, but had to be delayed since being administered clopidogrel.  Case has been declared emergent by CT surgery.  Preop arterial line  Post induction central line  Post induction CARLOS (no CI to CARLOS placement))  intravenous induction   Anesthetic plan, all risks, benefits, and alternatives have been provided, discussed and informed consent has been obtained with: patient.  Use of blood products discussed with patient  Consented to blood products.

## 2019-07-06 ENCOUNTER — APPOINTMENT (OUTPATIENT)
Dept: CARDIOLOGY | Facility: HOSPITAL | Age: 59
End: 2019-07-06

## 2019-07-06 ENCOUNTER — APPOINTMENT (OUTPATIENT)
Dept: GENERAL RADIOLOGY | Facility: HOSPITAL | Age: 59
End: 2019-07-06

## 2019-07-06 LAB
A-A DO2: ABNORMAL MMHG
ALBUMIN SERPL-MCNC: 2.6 G/DL (ref 3.5–5)
ANION GAP SERPL CALCULATED.3IONS-SCNC: 12 MMOL/L (ref 4–13)
ANION GAP SERPL CALCULATED.3IONS-SCNC: 6 MMOL/L (ref 4–13)
ANION GAP SERPL CALCULATED.3IONS-SCNC: 8 MMOL/L (ref 4–13)
APTT PPP: 39.4 SECONDS (ref 24.1–35)
APTT PPP: 42.5 SECONDS (ref 24.1–35)
ARTERIAL PATENCY WRIST A: ABNORMAL
ATMOSPHERIC PRESS: 749 MMHG
ATMOSPHERIC PRESS: 750 MMHG
ATMOSPHERIC PRESS: 751 MMHG
BASE EXCESS BLDA CALC-SCNC: -0.8 MMOL/L (ref 0–2)
BASE EXCESS BLDA CALC-SCNC: -1.9 MMOL/L (ref 0–2)
BASE EXCESS BLDA CALC-SCNC: -2 MMOL/L (ref 0–2)
BASE EXCESS BLDA CALC-SCNC: -2.1 MMOL/L (ref 0–2)
BASE EXCESS BLDA CALC-SCNC: -2.2 MMOL/L (ref 0–2)
BASE EXCESS BLDA CALC-SCNC: -2.6 MMOL/L (ref 0–2)
BASE EXCESS BLDA CALC-SCNC: -3.3 MMOL/L (ref 0–2)
BASE EXCESS BLDA CALC-SCNC: -3.4 MMOL/L (ref 0–2)
BASE EXCESS BLDA CALC-SCNC: -3.8 MMOL/L (ref 0–2)
BASE EXCESS BLDA CALC-SCNC: 1.6 MMOL/L (ref 0–2)
BASE EXCESS BLDV CALC-SCNC: 4 MMOL/L (ref 0–2)
BASOPHILS # BLD AUTO: 0.03 10*3/MM3 (ref 0–0.2)
BASOPHILS NFR BLD AUTO: 0.2 % (ref 0–2)
BDY SITE: ABNORMAL
BODY TEMPERATURE: 35.8 C
BODY TEMPERATURE: 37 C
BUN BLD-MCNC: 17 MG/DL (ref 5–21)
BUN BLD-MCNC: 20 MG/DL (ref 5–21)
BUN BLD-MCNC: 25 MG/DL (ref 5–21)
BUN/CREAT SERPL: 15.5 (ref 7–25)
BUN/CREAT SERPL: 18.7 (ref 7–25)
BUN/CREAT SERPL: 23.6 (ref 7–25)
CA-I BLD-MCNC: 4.27 MG/DL (ref 4.6–5.4)
CA-I BLD-MCNC: 4.67 MG/DL (ref 4.6–5.4)
CA-I BLD-MCNC: 4.76 MG/DL (ref 4.6–5.4)
CA-I BLD-MCNC: 4.89 MG/DL (ref 4.6–5.4)
CA-I BLD-MCNC: 5.08 MG/DL (ref 4.6–5.4)
CALCIUM SPEC-SCNC: 8.2 MG/DL (ref 8.4–10.4)
CALCIUM SPEC-SCNC: 8.9 MG/DL (ref 8.4–10.4)
CALCIUM SPEC-SCNC: 9.8 MG/DL (ref 8.4–10.4)
CHLORIDE SERPL-SCNC: 106 MMOL/L (ref 98–110)
CHLORIDE SERPL-SCNC: 108 MMOL/L (ref 98–110)
CHLORIDE SERPL-SCNC: 99 MMOL/L (ref 98–110)
CO2 SERPL-SCNC: 23 MMOL/L (ref 24–31)
CO2 SERPL-SCNC: 24 MMOL/L (ref 24–31)
CO2 SERPL-SCNC: 25 MMOL/L (ref 24–31)
COHGB MFR BLD: 0 % (ref 0–5)
COHGB MFR BLD: 0.4 % (ref 0–5)
CREAT BLD-MCNC: 1.06 MG/DL (ref 0.5–1.4)
CREAT BLD-MCNC: 1.07 MG/DL (ref 0.5–1.4)
CREAT BLD-MCNC: 1.1 MG/DL (ref 0.5–1.4)
DEPRECATED RDW RBC AUTO: 38.5 FL (ref 40–54)
DEPRECATED RDW RBC AUTO: 38.9 FL (ref 40–54)
DEPRECATED RDW RBC AUTO: 39 FL (ref 40–54)
EOSINOPHIL # BLD AUTO: 0.05 10*3/MM3 (ref 0–0.7)
EOSINOPHIL NFR BLD AUTO: 0.4 % (ref 0–4)
ERYTHROCYTE [DISTWIDTH] IN BLOOD BY AUTOMATED COUNT: 12.3 % (ref 12–15)
ERYTHROCYTE [DISTWIDTH] IN BLOOD BY AUTOMATED COUNT: 12.4 % (ref 12–15)
ERYTHROCYTE [DISTWIDTH] IN BLOOD BY AUTOMATED COUNT: 12.4 % (ref 12–15)
GAS FLOW AIRWAY: 2 LPM
GAS FLOW AIRWAY: 3.5 LPM
GFR SERPL CREATININE-BSD FRML MDRD: 68 ML/MIN/1.73
GFR SERPL CREATININE-BSD FRML MDRD: 70 ML/MIN/1.73
GFR SERPL CREATININE-BSD FRML MDRD: 71 ML/MIN/1.73
GLUCOSE BLD-MCNC: 112 MG/DL (ref 70–100)
GLUCOSE BLD-MCNC: 123 MG/DL (ref 70–100)
GLUCOSE BLD-MCNC: 140 MG/DL (ref 70–100)
GLUCOSE BLDC GLUCOMTR-MCNC: 118 MG/DL (ref 70–130)
GLUCOSE BLDC GLUCOMTR-MCNC: 121 MG/DL (ref 70–130)
GLUCOSE BLDC GLUCOMTR-MCNC: 128 MG/DL (ref 70–130)
GLUCOSE BLDC GLUCOMTR-MCNC: 132 MG/DL (ref 70–130)
GLUCOSE BLDC GLUCOMTR-MCNC: 133 MG/DL (ref 70–130)
GLUCOSE BLDC GLUCOMTR-MCNC: 136 MG/DL (ref 70–130)
GLUCOSE BLDC GLUCOMTR-MCNC: 139 MG/DL (ref 70–130)
GLUCOSE BLDC GLUCOMTR-MCNC: 167 MG/DL (ref 70–130)
GLUCOSE BLDC GLUCOMTR-MCNC: 174 MG/DL (ref 70–130)
GLUCOSE BLDC GLUCOMTR-MCNC: 188 MG/DL (ref 70–130)
GLUCOSE BLDC GLUCOMTR-MCNC: 191 MG/DL (ref 70–130)
GLUCOSE BLDC GLUCOMTR-MCNC: 193 MG/DL (ref 70–130)
GLUCOSE BLDC GLUCOMTR-MCNC: 94 MG/DL (ref 70–130)
HCO3 BLDA-SCNC: 22 MMOL/L (ref 20–26)
HCO3 BLDA-SCNC: 22.3 MMOL/L (ref 20–26)
HCO3 BLDA-SCNC: 23.1 MMOL/L (ref 20–26)
HCO3 BLDA-SCNC: 23.2 MMOL/L (ref 20–26)
HCO3 BLDA-SCNC: 23.4 MMOL/L (ref 20–26)
HCO3 BLDA-SCNC: 23.5 MMOL/L (ref 20–26)
HCO3 BLDA-SCNC: 23.8 MMOL/L (ref 20–26)
HCO3 BLDA-SCNC: 24 MMOL/L (ref 20–26)
HCO3 BLDA-SCNC: 24.2 MMOL/L (ref 20–26)
HCO3 BLDA-SCNC: 26.6 MMOL/L (ref 20–26)
HCO3 BLDV-SCNC: 29.1 MMOL/L (ref 22–28)
HCT VFR BLD AUTO: 30.1 % (ref 40–52)
HCT VFR BLD AUTO: 39.3 % (ref 40–52)
HCT VFR BLD AUTO: 43.8 % (ref 40–52)
HCT VFR BLD CALC: 33.5 % (ref 38–51)
HCT VFR BLD CALC: 39.3 % (ref 38–51)
HCT VFR BLD CALC: 43.1 % (ref 38–51)
HCT VFR BLD CALC: 46.5 % (ref 38–51)
HGB BLD-MCNC: 10.5 G/DL (ref 14–18)
HGB BLD-MCNC: 13.5 G/DL (ref 14–18)
HGB BLD-MCNC: 15.1 G/DL (ref 14–18)
HGB BLDA-MCNC: 10.9 G/DL (ref 14–18)
HGB BLDA-MCNC: 11.6 G/DL (ref 14–18)
HGB BLDA-MCNC: 12.8 G/DL (ref 14–18)
HGB BLDA-MCNC: 14 G/DL (ref 14–18)
HGB BLDA-MCNC: 15.2 G/DL (ref 14–18)
HOROWITZ INDEX BLD+IHG-RTO: 100 %
HOROWITZ INDEX BLD+IHG-RTO: 30 %
HOROWITZ INDEX BLD+IHG-RTO: 60 %
HOROWITZ INDEX BLD+IHG-RTO: 60 %
HOROWITZ INDEX BLD+IHG-RTO: 80 %
INR PPP: 0.94 (ref 0.91–1.09)
INR PPP: 1.19 (ref 0.91–1.09)
LYMPHOCYTES # BLD AUTO: 2.79 10*3/MM3 (ref 0.72–4.86)
LYMPHOCYTES NFR BLD AUTO: 23.2 % (ref 15–45)
Lab: ABNORMAL
Lab: NORMAL
MCH RBC QN AUTO: 29.6 PG (ref 28–32)
MCH RBC QN AUTO: 29.8 PG (ref 28–32)
MCH RBC QN AUTO: 29.8 PG (ref 28–32)
MCHC RBC AUTO-ENTMCNC: 34.4 G/DL (ref 33–36)
MCHC RBC AUTO-ENTMCNC: 34.5 G/DL (ref 33–36)
MCHC RBC AUTO-ENTMCNC: 34.9 G/DL (ref 33–36)
MCV RBC AUTO: 85.5 FL (ref 82–95)
MCV RBC AUTO: 86.2 FL (ref 82–95)
MCV RBC AUTO: 86.4 FL (ref 82–95)
METHGB BLD QL: 1.3 % (ref 0–3)
METHGB BLD QL: 1.3 % (ref 0–3)
METHGB BLD QL: 1.5 % (ref 0–3)
MODALITY: ABNORMAL
MONOCYTES # BLD AUTO: 0.41 10*3/MM3 (ref 0.19–1.3)
MONOCYTES NFR BLD AUTO: 3.4 % (ref 4–12)
NEUTROPHILS # BLD AUTO: 8.6 10*3/MM3 (ref 1.87–8.4)
NEUTROPHILS NFR BLD AUTO: 71.5 % (ref 39–78)
NOTE: ABNORMAL
NOTIFIED BY: ABNORMAL
NOTIFIED WHO: ABNORMAL
OXYHGB MFR BLDV: 72.3 % (ref 60–85)
OXYHGB MFR BLDV: 95.5 % (ref 94–99)
OXYHGB MFR BLDV: 96.7 % (ref 94–99)
OXYHGB MFR BLDV: 96.8 % (ref 94–99)
OXYHGB MFR BLDV: 97 % (ref 94–99)
PCO2 BLDA: 36.4 MM HG (ref 35–45)
PCO2 BLDA: 40.6 MM HG (ref 35–45)
PCO2 BLDA: 40.7 MM HG (ref 35–45)
PCO2 BLDA: 42.1 MM HG (ref 35–45)
PCO2 BLDA: 42.1 MM HG (ref 35–45)
PCO2 BLDA: 42.2 MM HG (ref 35–45)
PCO2 BLDA: 42.8 MM HG (ref 35–45)
PCO2 BLDA: 43.1 MM HG (ref 35–45)
PCO2 BLDA: 47.8 MM HG (ref 35–45)
PCO2 BLDA: 51 MM HG (ref 35–45)
PCO2 BLDV: 45.3 MM HG (ref 41–51)
PCO2 TEMP ADJ BLD: ABNORMAL MM HG (ref 35–45)
PEEP RESPIRATORY: 5 CM[H2O]
PH BLDA: 7.28 PH UNITS (ref 7.35–7.45)
PH BLDA: 7.3 PH UNITS (ref 7.35–7.45)
PH BLDA: 7.33 PH UNITS (ref 7.35–7.45)
PH BLDA: 7.35 PH UNITS (ref 7.35–7.45)
PH BLDA: 7.35 PH UNITS (ref 7.35–7.45)
PH BLDA: 7.36 PH UNITS (ref 7.35–7.45)
PH BLDA: 7.36 PH UNITS (ref 7.35–7.45)
PH BLDA: 7.38 PH UNITS (ref 7.35–7.45)
PH BLDA: 7.4 PH UNITS (ref 7.35–7.45)
PH BLDA: 7.4 PH UNITS (ref 7.35–7.45)
PH BLDV: 7.42 PH UNITS (ref 7.32–7.42)
PH, TEMP CORRECTED: ABNORMAL PH UNITS (ref 7.35–7.45)
PHOSPHATE SERPL-MCNC: 2 MG/DL (ref 2.5–4.5)
PLATELET # BLD AUTO: 101 10*3/MM3 (ref 130–400)
PLATELET # BLD AUTO: 121 10*3/MM3 (ref 130–400)
PLATELET # BLD AUTO: 136 10*3/MM3 (ref 130–400)
PMV BLD AUTO: 11.1 FL (ref 6–12)
PMV BLD AUTO: 11.3 FL (ref 6–12)
PMV BLD AUTO: 11.4 FL (ref 6–12)
PO2 BLDA: 121 MM HG (ref 83–108)
PO2 BLDA: 151 MM HG (ref 83–108)
PO2 BLDA: 174 MM HG (ref 83–108)
PO2 BLDA: 359 MM HG (ref 83–108)
PO2 BLDA: 481 MM HG (ref 83–108)
PO2 BLDA: 61.7 MM HG (ref 83–108)
PO2 BLDA: 88.8 MM HG (ref 83–108)
PO2 BLDA: 92.9 MM HG (ref 83–108)
PO2 BLDA: 97.2 MM HG (ref 83–108)
PO2 BLDA: >547 MM HG (ref 83–108)
PO2 BLDV: 39.2 MM HG (ref 27–53)
PO2 TEMP ADJ BLD: ABNORMAL MM HG (ref 83–108)
POTASSIUM BLD-SCNC: 3.9 MMOL/L (ref 3.5–5.3)
POTASSIUM BLD-SCNC: 3.9 MMOL/L (ref 3.5–5.3)
POTASSIUM BLD-SCNC: 4.2 MMOL/L (ref 3.5–5.3)
POTASSIUM BLDA-SCNC: 4.1 MMOL/L (ref 3.5–5.2)
POTASSIUM BLDA-SCNC: 4.9 MMOL/L (ref 3.5–5.2)
POTASSIUM BLDA-SCNC: 4.9 MMOL/L (ref 3.5–5.2)
POTASSIUM BLDA-SCNC: 5.1 MMOL/L (ref 3.5–5.2)
POTASSIUM BLDV-SCNC: 4.9 MMOL/L (ref 3.5–5.2)
PROTHROMBIN TIME: 12.8 SECONDS (ref 11.9–14.6)
PROTHROMBIN TIME: 15.5 SECONDS (ref 11.9–14.6)
PSV: 10 CMH2O
RBC # BLD AUTO: 3.52 10*6/MM3 (ref 4.8–5.9)
RBC # BLD AUTO: 4.56 10*6/MM3 (ref 4.8–5.9)
RBC # BLD AUTO: 5.07 10*6/MM3 (ref 4.8–5.9)
SAO2 % BLDCOA: 87 % (ref 94–99)
SAO2 % BLDCOA: 95.3 % (ref 94–99)
SAO2 % BLDCOA: 95.5 % (ref 94–99)
SAO2 % BLDCOA: 96 % (ref 94–99)
SAO2 % BLDCOA: 96.7 % (ref 94–99)
SAO2 % BLDCOA: 96.8 % (ref 94–99)
SAO2 % BLDCOA: 97.6 % (ref 94–99)
SAO2 % BLDCOA: 97.7 % (ref 94–99)
SAO2 % BLDCOA: 97.8 % (ref 94–99)
SAO2 % BLDCOA: 97.9 % (ref 94–99)
SAO2 % BLDCOV: 73.5 % (ref 45–75)
SET MECH RESP RATE: 10
SET MECH RESP RATE: 14
SET MECH RESP RATE: 18
SET MECH RESP RATE: 18
SET MECH RESP RATE: 6
SODIUM BLD-SCNC: 136 MMOL/L (ref 135–145)
SODIUM BLD-SCNC: 137 MMOL/L (ref 135–145)
SODIUM BLD-SCNC: 138 MMOL/L (ref 135–145)
SODIUM BLDA-SCNC: 135 MMOL/L (ref 136–145)
SODIUM BLDA-SCNC: 136 MMOL/L (ref 136–145)
SODIUM BLDA-SCNC: 136 MMOL/L (ref 136–145)
SODIUM BLDA-SCNC: 139 MMOL/L (ref 136–145)
SODIUM BLDV-SCNC: 137 MMOL/L (ref 136–145)
VENTILATOR MODE: ABNORMAL
VT ON VENT VENT: 650 ML
VT ON VENT VENT: 700 ML
WBC NRBC COR # BLD: 12.04 10*3/MM3 (ref 4.8–10.8)
WBC NRBC COR # BLD: 13.14 10*3/MM3 (ref 4.8–10.8)
WBC NRBC COR # BLD: 8.23 10*3/MM3 (ref 4.8–10.8)

## 2019-07-06 PROCEDURE — P9041 ALBUMIN (HUMAN),5%, 50ML: HCPCS

## 2019-07-06 PROCEDURE — 94799 UNLISTED PULMONARY SVC/PX: CPT

## 2019-07-06 PROCEDURE — 25010000002 VANCOMYCIN 1 G RECONSTITUTED SOLUTION 1 EACH VIAL: Performed by: THORACIC SURGERY (CARDIOTHORACIC VASCULAR SURGERY)

## 2019-07-06 PROCEDURE — 82803 BLOOD GASES ANY COMBINATION: CPT

## 2019-07-06 PROCEDURE — 82962 GLUCOSE BLOOD TEST: CPT

## 2019-07-06 PROCEDURE — 021009W BYPASS CORONARY ARTERY, ONE ARTERY FROM AORTA WITH AUTOLOGOUS VENOUS TISSUE, OPEN APPROACH: ICD-10-PCS | Performed by: THORACIC SURGERY (CARDIOTHORACIC VASCULAR SURGERY)

## 2019-07-06 PROCEDURE — 25010000003 POTASSIUM CHLORIDE PER 2 MEQ: Performed by: THORACIC SURGERY (CARDIOTHORACIC VASCULAR SURGERY)

## 2019-07-06 PROCEDURE — 85730 THROMBOPLASTIN TIME PARTIAL: CPT | Performed by: THORACIC SURGERY (CARDIOTHORACIC VASCULAR SURGERY)

## 2019-07-06 PROCEDURE — 83050 HGB METHEMOGLOBIN QUAN: CPT

## 2019-07-06 PROCEDURE — 93325 DOPPLER ECHO COLOR FLOW MAPG: CPT | Performed by: INTERNAL MEDICINE

## 2019-07-06 PROCEDURE — 25010000002 SUCCINYLCHOLINE PER 20 MG: Performed by: NURSE ANESTHETIST, CERTIFIED REGISTERED

## 2019-07-06 PROCEDURE — 93355 ECHO TRANSESOPHAGEAL (TEE): CPT

## 2019-07-06 PROCEDURE — 25010000002 PHENYLEPHRINE PER 1 ML: Performed by: NURSE ANESTHETIST, CERTIFIED REGISTERED

## 2019-07-06 PROCEDURE — 25010000002 ONDANSETRON PER 1 MG: Performed by: THORACIC SURGERY (CARDIOTHORACIC VASCULAR SURGERY)

## 2019-07-06 PROCEDURE — 25010000002 HEPARIN (PORCINE) PER 1000 UNITS: Performed by: THORACIC SURGERY (CARDIOTHORACIC VASCULAR SURGERY)

## 2019-07-06 PROCEDURE — 25010000002 MORPHINE PER 10 MG: Performed by: THORACIC SURGERY (CARDIOTHORACIC VASCULAR SURGERY)

## 2019-07-06 PROCEDURE — 25010000003 CEFAZOLIN PER 500 MG: Performed by: NURSE ANESTHETIST, CERTIFIED REGISTERED

## 2019-07-06 PROCEDURE — 36430 TRANSFUSION BLD/BLD COMPNT: CPT

## 2019-07-06 PROCEDURE — 02100Z9 BYPASS CORONARY ARTERY, ONE ARTERY FROM LEFT INTERNAL MAMMARY, OPEN APPROACH: ICD-10-PCS | Performed by: THORACIC SURGERY (CARDIOTHORACIC VASCULAR SURGERY)

## 2019-07-06 PROCEDURE — 82375 ASSAY CARBOXYHB QUANT: CPT

## 2019-07-06 PROCEDURE — 5A1221Z PERFORMANCE OF CARDIAC OUTPUT, CONTINUOUS: ICD-10-PCS | Performed by: THORACIC SURGERY (CARDIOTHORACIC VASCULAR SURGERY)

## 2019-07-06 PROCEDURE — 85610 PROTHROMBIN TIME: CPT | Performed by: THORACIC SURGERY (CARDIOTHORACIC VASCULAR SURGERY)

## 2019-07-06 PROCEDURE — 93312 ECHO TRANSESOPHAGEAL: CPT | Performed by: INTERNAL MEDICINE

## 2019-07-06 PROCEDURE — 94002 VENT MGMT INPAT INIT DAY: CPT

## 2019-07-06 PROCEDURE — 25010000002 PROTAMINE SULFATE PER 10 MG: Performed by: NURSE ANESTHETIST, CERTIFIED REGISTERED

## 2019-07-06 PROCEDURE — 82805 BLOOD GASES W/O2 SATURATION: CPT

## 2019-07-06 PROCEDURE — 94760 N-INVAS EAR/PLS OXIMETRY 1: CPT

## 2019-07-06 PROCEDURE — 33533 CABG ARTERIAL SINGLE: CPT | Performed by: THORACIC SURGERY (CARDIOTHORACIC VASCULAR SURGERY)

## 2019-07-06 PROCEDURE — 25010000002 MIDAZOLAM PER 1 MG: Performed by: NURSE ANESTHETIST, CERTIFIED REGISTERED

## 2019-07-06 PROCEDURE — 06BQ0ZZ EXCISION OF LEFT SAPHENOUS VEIN, OPEN APPROACH: ICD-10-PCS | Performed by: THORACIC SURGERY (CARDIOTHORACIC VASCULAR SURGERY)

## 2019-07-06 PROCEDURE — 25010000002 HEPARIN (PORCINE) PER 1000 UNITS: Performed by: NURSE ANESTHETIST, CERTIFIED REGISTERED

## 2019-07-06 PROCEDURE — 80069 RENAL FUNCTION PANEL: CPT | Performed by: THORACIC SURGERY (CARDIOTHORACIC VASCULAR SURGERY)

## 2019-07-06 PROCEDURE — 71045 X-RAY EXAM CHEST 1 VIEW: CPT

## 2019-07-06 PROCEDURE — 80048 BASIC METABOLIC PNL TOTAL CA: CPT | Performed by: THORACIC SURGERY (CARDIOTHORACIC VASCULAR SURGERY)

## 2019-07-06 PROCEDURE — A4648 IMPLANTABLE TISSUE MARKER: HCPCS | Performed by: THORACIC SURGERY (CARDIOTHORACIC VASCULAR SURGERY)

## 2019-07-06 PROCEDURE — 25010000002 VANCOMYCIN 1 G RECONSTITUTED SOLUTION: Performed by: NURSE ANESTHETIST, CERTIFIED REGISTERED

## 2019-07-06 PROCEDURE — 82330 ASSAY OF CALCIUM: CPT

## 2019-07-06 PROCEDURE — B246ZZ4 ULTRASONOGRAPHY OF RIGHT AND LEFT HEART, TRANSESOPHAGEAL: ICD-10-PCS | Performed by: THORACIC SURGERY (CARDIOTHORACIC VASCULAR SURGERY)

## 2019-07-06 PROCEDURE — 3E080GC INTRODUCTION OF OTHER THERAPEUTIC SUBSTANCE INTO HEART, OPEN APPROACH: ICD-10-PCS | Performed by: THORACIC SURGERY (CARDIOTHORACIC VASCULAR SURGERY)

## 2019-07-06 PROCEDURE — 25010000002 PAPAVERINE PER 60 MG: Performed by: THORACIC SURGERY (CARDIOTHORACIC VASCULAR SURGERY)

## 2019-07-06 PROCEDURE — 33517 CABG ARTERY-VEIN SINGLE: CPT | Performed by: THORACIC SURGERY (CARDIOTHORACIC VASCULAR SURGERY)

## 2019-07-06 PROCEDURE — 93318 ECHO TRANSESOPHAGEAL INTRAOP: CPT

## 2019-07-06 PROCEDURE — 25810000003 DEXTROSE 5 % WITH KCL 20 MEQ 20-5 MEQ/L-% SOLUTION: Performed by: THORACIC SURGERY (CARDIOTHORACIC VASCULAR SURGERY)

## 2019-07-06 PROCEDURE — 85027 COMPLETE CBC AUTOMATED: CPT | Performed by: THORACIC SURGERY (CARDIOTHORACIC VASCULAR SURGERY)

## 2019-07-06 PROCEDURE — P9035 PLATELET PHERES LEUKOREDUCED: HCPCS

## 2019-07-06 PROCEDURE — 25010000002 ALBUMIN HUMAN 5% PER 50 ML

## 2019-07-06 PROCEDURE — 25010000002 PROPRANOLOL PER 1 MG: Performed by: NURSE ANESTHETIST, CERTIFIED REGISTERED

## 2019-07-06 PROCEDURE — 85025 COMPLETE CBC W/AUTO DIFF WBC: CPT | Performed by: THORACIC SURGERY (CARDIOTHORACIC VASCULAR SURGERY)

## 2019-07-06 PROCEDURE — 94640 AIRWAY INHALATION TREATMENT: CPT

## 2019-07-06 PROCEDURE — 25010000002 CEFAZOLIN PER 500 MG: Performed by: THORACIC SURGERY (CARDIOTHORACIC VASCULAR SURGERY)

## 2019-07-06 PROCEDURE — 82820 HEMOGLOBIN-OXYGEN AFFINITY: CPT

## 2019-07-06 DEVICE — DISK-SHAPED STYLE, SILICONE (1 PER STERILE PKG)
Type: IMPLANTABLE DEVICE | Status: FUNCTIONAL
Brand: SCANLAN® RADIOMARK® GRAFT MARKERS

## 2019-07-06 DEVICE — CLIP LIGAT VASC HORIZON TI LG ORNG 6CT: Type: IMPLANTABLE DEVICE | Status: FUNCTIONAL

## 2019-07-06 RX ORDER — IPRATROPIUM BROMIDE AND ALBUTEROL SULFATE 2.5; .5 MG/3ML; MG/3ML
3 SOLUTION RESPIRATORY (INHALATION)
Status: DISCONTINUED | OUTPATIENT
Start: 2019-07-06 | End: 2019-07-09

## 2019-07-06 RX ORDER — POTASSIUM CHLORIDE, DEXTROSE MONOHYDRATE 150; 5 MG/100ML; G/100ML
30 INJECTION, SOLUTION INTRAVENOUS CONTINUOUS
Status: DISCONTINUED | OUTPATIENT
Start: 2019-07-06 | End: 2019-07-09

## 2019-07-06 RX ORDER — MEPERIDINE HYDROCHLORIDE 50 MG/ML
25 INJECTION INTRAMUSCULAR; INTRAVENOUS; SUBCUTANEOUS
Status: ACTIVE | OUTPATIENT
Start: 2019-07-06 | End: 2019-07-07

## 2019-07-06 RX ORDER — NOREPINEPHRINE BITARTRATE 1 MG/ML
INJECTION, SOLUTION INTRAVENOUS
Status: ACTIVE
Start: 2019-07-06 | End: 2019-07-07

## 2019-07-06 RX ORDER — HEPARIN SODIUM 1000 [USP'U]/ML
INJECTION, SOLUTION INTRAVENOUS; SUBCUTANEOUS AS NEEDED
Status: DISCONTINUED | OUTPATIENT
Start: 2019-07-06 | End: 2019-07-06 | Stop reason: SURG

## 2019-07-06 RX ORDER — SODIUM CHLORIDE, SODIUM LACTATE, POTASSIUM CHLORIDE, CALCIUM CHLORIDE 600; 310; 30; 20 MG/100ML; MG/100ML; MG/100ML; MG/100ML
INJECTION, SOLUTION INTRAVENOUS CONTINUOUS PRN
Status: DISCONTINUED | OUTPATIENT
Start: 2019-07-06 | End: 2019-07-06 | Stop reason: SURG

## 2019-07-06 RX ORDER — PROPRANOLOL HYDROCHLORIDE 1 MG/ML
INJECTION, SOLUTION INTRAVENOUS AS NEEDED
Status: DISCONTINUED | OUTPATIENT
Start: 2019-07-06 | End: 2019-07-06 | Stop reason: SURG

## 2019-07-06 RX ORDER — SODIUM CHLORIDE 9 MG/ML
INJECTION, SOLUTION INTRAVENOUS AS NEEDED
Status: DISCONTINUED | OUTPATIENT
Start: 2019-07-06 | End: 2019-07-06 | Stop reason: HOSPADM

## 2019-07-06 RX ORDER — ASPIRIN 81 MG/1
162 TABLET, CHEWABLE ORAL ONCE
Status: COMPLETED | OUTPATIENT
Start: 2019-07-07 | End: 2019-07-07

## 2019-07-06 RX ORDER — DEXTROSE MONOHYDRATE 25 G/50ML
25-50 INJECTION, SOLUTION INTRAVENOUS
Status: DISCONTINUED | OUTPATIENT
Start: 2019-07-06 | End: 2019-07-09

## 2019-07-06 RX ORDER — OXYCODONE HYDROCHLORIDE AND ACETAMINOPHEN 5; 325 MG/1; MG/1
1 TABLET ORAL
Status: DISCONTINUED | OUTPATIENT
Start: 2019-07-06 | End: 2019-07-10 | Stop reason: HOSPADM

## 2019-07-06 RX ORDER — CLOPIDOGREL BISULFATE 75 MG/1
75 TABLET ORAL DAILY
Status: DISCONTINUED | OUTPATIENT
Start: 2019-07-07 | End: 2019-07-07

## 2019-07-06 RX ORDER — VASOPRESSIN 20 U/ML
INJECTION PARENTERAL AS NEEDED
Status: DISCONTINUED | OUTPATIENT
Start: 2019-07-06 | End: 2019-07-06 | Stop reason: SURG

## 2019-07-06 RX ORDER — VANCOMYCIN HYDROCHLORIDE 1 G/20ML
INJECTION, POWDER, LYOPHILIZED, FOR SOLUTION INTRAVENOUS AS NEEDED
Status: DISCONTINUED | OUTPATIENT
Start: 2019-07-06 | End: 2019-07-06 | Stop reason: SURG

## 2019-07-06 RX ORDER — BISACODYL 5 MG/1
10 TABLET, DELAYED RELEASE ORAL 2 TIMES DAILY
Status: DISCONTINUED | OUTPATIENT
Start: 2019-07-06 | End: 2019-07-10 | Stop reason: HOSPADM

## 2019-07-06 RX ORDER — AMINOCAPROIC ACID 250 MG/ML
INJECTION, SOLUTION INTRAVENOUS AS NEEDED
Status: DISCONTINUED | OUTPATIENT
Start: 2019-07-06 | End: 2019-07-06 | Stop reason: SURG

## 2019-07-06 RX ORDER — ALBUMIN, HUMAN INJ 5% 5 %
SOLUTION INTRAVENOUS
Status: COMPLETED
Start: 2019-07-06 | End: 2019-07-06

## 2019-07-06 RX ORDER — MORPHINE SULFATE 2 MG/ML
2 INJECTION, SOLUTION INTRAMUSCULAR; INTRAVENOUS
Status: DISCONTINUED | OUTPATIENT
Start: 2019-07-06 | End: 2019-07-07

## 2019-07-06 RX ORDER — CHLORHEXIDINE GLUCONATE 0.12 MG/ML
15 RINSE ORAL EVERY 12 HOURS
Status: DISCONTINUED | OUTPATIENT
Start: 2019-07-06 | End: 2019-07-08

## 2019-07-06 RX ORDER — ROCURONIUM BROMIDE 10 MG/ML
INJECTION, SOLUTION INTRAVENOUS AS NEEDED
Status: DISCONTINUED | OUTPATIENT
Start: 2019-07-06 | End: 2019-07-06 | Stop reason: SURG

## 2019-07-06 RX ORDER — BUPIVACAINE HCL/0.9 % NACL/PF 0.1 %
2 PLASTIC BAG, INJECTION (ML) EPIDURAL EVERY 8 HOURS
Status: COMPLETED | OUTPATIENT
Start: 2019-07-06 | End: 2019-07-08

## 2019-07-06 RX ORDER — PROTAMINE SULFATE 10 MG/ML
INJECTION, SOLUTION INTRAVENOUS AS NEEDED
Status: DISCONTINUED | OUTPATIENT
Start: 2019-07-06 | End: 2019-07-06 | Stop reason: SURG

## 2019-07-06 RX ORDER — ETOMIDATE 2 MG/ML
INJECTION INTRAVENOUS AS NEEDED
Status: DISCONTINUED | OUTPATIENT
Start: 2019-07-06 | End: 2019-07-06 | Stop reason: SURG

## 2019-07-06 RX ORDER — NITROGLYCERIN 20 MG/100ML
5 INJECTION INTRAVENOUS CONTINUOUS
Status: DISCONTINUED | OUTPATIENT
Start: 2019-07-06 | End: 2019-07-09

## 2019-07-06 RX ORDER — VECURONIUM BROMIDE 1 MG/ML
INJECTION, POWDER, LYOPHILIZED, FOR SOLUTION INTRAVENOUS AS NEEDED
Status: DISCONTINUED | OUTPATIENT
Start: 2019-07-06 | End: 2019-07-06 | Stop reason: SURG

## 2019-07-06 RX ORDER — AMINOCAPROIC ACID 250 MG/ML
INJECTION, SOLUTION INTRAVENOUS
Status: COMPLETED
Start: 2019-07-06 | End: 2019-07-06

## 2019-07-06 RX ORDER — LIDOCAINE HYDROCHLORIDE 20 MG/ML
INJECTION, SOLUTION INFILTRATION; PERINEURAL AS NEEDED
Status: DISCONTINUED | OUTPATIENT
Start: 2019-07-06 | End: 2019-07-06 | Stop reason: SURG

## 2019-07-06 RX ORDER — SODIUM CHLORIDE 9 MG/ML
INJECTION, SOLUTION INTRAVENOUS CONTINUOUS PRN
Status: DISCONTINUED | OUTPATIENT
Start: 2019-07-06 | End: 2019-07-06 | Stop reason: SURG

## 2019-07-06 RX ORDER — CEFAZOLIN SODIUM 1 G/3ML
INJECTION, POWDER, FOR SOLUTION INTRAMUSCULAR; INTRAVENOUS AS NEEDED
Status: DISCONTINUED | OUTPATIENT
Start: 2019-07-06 | End: 2019-07-06 | Stop reason: SURG

## 2019-07-06 RX ORDER — POTASSIUM CHLORIDE 29.8 MG/ML
20 INJECTION INTRAVENOUS
Status: COMPLETED | OUTPATIENT
Start: 2019-07-06 | End: 2019-07-06

## 2019-07-06 RX ORDER — BUPIVACAINE HCL/0.9 % NACL/PF 0.1 %
2 PLASTIC BAG, INJECTION (ML) EPIDURAL ONCE
Status: DISCONTINUED | OUTPATIENT
Start: 2019-07-06 | End: 2019-07-06 | Stop reason: HOSPADM

## 2019-07-06 RX ORDER — SUCCINYLCHOLINE CHLORIDE 20 MG/ML
INJECTION INTRAMUSCULAR; INTRAVENOUS AS NEEDED
Status: DISCONTINUED | OUTPATIENT
Start: 2019-07-06 | End: 2019-07-06 | Stop reason: SURG

## 2019-07-06 RX ORDER — AMIODARONE HYDROCHLORIDE 200 MG/1
400 TABLET ORAL EVERY 12 HOURS SCHEDULED
Status: DISCONTINUED | OUTPATIENT
Start: 2019-07-07 | End: 2019-07-09

## 2019-07-06 RX ORDER — SODIUM CHLORIDE, SODIUM LACTATE, POTASSIUM CHLORIDE, CALCIUM CHLORIDE 600; 310; 30; 20 MG/100ML; MG/100ML; MG/100ML; MG/100ML
INJECTION, SOLUTION INTRAVENOUS CONTINUOUS PRN
Status: DISCONTINUED | OUTPATIENT
Start: 2019-07-06 | End: 2019-07-06

## 2019-07-06 RX ORDER — ALBUMIN, HUMAN INJ 5% 5 %
500 SOLUTION INTRAVENOUS ONCE
Status: COMPLETED | OUTPATIENT
Start: 2019-07-06 | End: 2019-07-06

## 2019-07-06 RX ORDER — OXYCODONE AND ACETAMINOPHEN 10; 325 MG/1; MG/1
1 TABLET ORAL
Status: DISCONTINUED | OUTPATIENT
Start: 2019-07-06 | End: 2019-07-09

## 2019-07-06 RX ORDER — POTASSIUM CHLORIDE 29.8 MG/ML
20 INJECTION INTRAVENOUS
Status: DISCONTINUED | OUTPATIENT
Start: 2019-07-06 | End: 2019-07-09

## 2019-07-06 RX ORDER — ONDANSETRON 2 MG/ML
4 INJECTION INTRAMUSCULAR; INTRAVENOUS EVERY 6 HOURS PRN
Status: DISCONTINUED | OUTPATIENT
Start: 2019-07-06 | End: 2019-07-10 | Stop reason: HOSPADM

## 2019-07-06 RX ORDER — SUFENTANIL CITRATE 50 UG/ML
INJECTION EPIDURAL; INTRAVENOUS AS NEEDED
Status: DISCONTINUED | OUTPATIENT
Start: 2019-07-06 | End: 2019-07-06 | Stop reason: SURG

## 2019-07-06 RX ORDER — ASPIRIN 81 MG/1
81 TABLET ORAL DAILY
Status: DISCONTINUED | OUTPATIENT
Start: 2019-07-08 | End: 2019-07-10 | Stop reason: HOSPADM

## 2019-07-06 RX ORDER — CALCIUM CHLORIDE 100 MG/ML
INJECTION INTRAVENOUS; INTRAVENTRICULAR AS NEEDED
Status: DISCONTINUED | OUTPATIENT
Start: 2019-07-06 | End: 2019-07-06 | Stop reason: SURG

## 2019-07-06 RX ORDER — PHENYLEPHRINE HCL IN 0.9% NACL 0.8MG/10ML
SYRINGE (ML) INTRAVENOUS AS NEEDED
Status: DISCONTINUED | OUTPATIENT
Start: 2019-07-06 | End: 2019-07-06 | Stop reason: SURG

## 2019-07-06 RX ORDER — MIDAZOLAM HYDROCHLORIDE 1 MG/ML
INJECTION INTRAMUSCULAR; INTRAVENOUS AS NEEDED
Status: DISCONTINUED | OUTPATIENT
Start: 2019-07-06 | End: 2019-07-06 | Stop reason: SURG

## 2019-07-06 RX ORDER — MAGNESIUM HYDROXIDE 1200 MG/15ML
LIQUID ORAL AS NEEDED
Status: DISCONTINUED | OUTPATIENT
Start: 2019-07-06 | End: 2019-07-06 | Stop reason: HOSPADM

## 2019-07-06 RX ADMIN — SODIUM CHLORIDE, POTASSIUM CHLORIDE, SODIUM LACTATE AND CALCIUM CHLORIDE: 600; 310; 30; 20 INJECTION, SOLUTION INTRAVENOUS at 07:36

## 2019-07-06 RX ADMIN — Medication 80 MCG: at 07:30

## 2019-07-06 RX ADMIN — CALCIUM CHLORIDE 300 MG: 100 INJECTION, SOLUTION INTRAVENOUS; INTRAVENTRICULAR at 11:44

## 2019-07-06 RX ADMIN — POTASSIUM CHLORIDE AND DEXTROSE MONOHYDRATE 30 ML/HR: 150; 5 INJECTION, SOLUTION INTRAVENOUS at 14:21

## 2019-07-06 RX ADMIN — SUFENTANIL CITRATE 100 MCG: 50 INJECTION, SOLUTION EPIDURAL; INTRAVENOUS at 10:45

## 2019-07-06 RX ADMIN — VASOPRESSIN 0.5 UNITS: 20 INJECTION INTRAVENOUS at 11:35

## 2019-07-06 RX ADMIN — LIDOCAINE HYDROCHLORIDE 100 MG: 20 INJECTION, SOLUTION INFILTRATION; PERINEURAL at 07:28

## 2019-07-06 RX ADMIN — SODIUM CHLORIDE 8 UNITS/HR: 9 INJECTION, SOLUTION INTRAVENOUS at 00:50

## 2019-07-06 RX ADMIN — SODIUM CHLORIDE 4 UNITS/HR: 9 INJECTION, SOLUTION INTRAVENOUS at 13:34

## 2019-07-06 RX ADMIN — MORPHINE SULFATE 2 MG: 2 INJECTION, SOLUTION INTRAMUSCULAR; INTRAVENOUS at 14:29

## 2019-07-06 RX ADMIN — SUFENTANIL CITRATE 50 MCG: 50 INJECTION, SOLUTION EPIDURAL; INTRAVENOUS at 08:08

## 2019-07-06 RX ADMIN — SODIUM CHLORIDE, POTASSIUM CHLORIDE, SODIUM LACTATE AND CALCIUM CHLORIDE 1000 ML: 600; 310; 30; 20 INJECTION, SOLUTION INTRAVENOUS at 17:03

## 2019-07-06 RX ADMIN — SODIUM CHLORIDE, POTASSIUM CHLORIDE, SODIUM LACTATE AND CALCIUM CHLORIDE: 600; 310; 30; 20 INJECTION, SOLUTION INTRAVENOUS at 12:48

## 2019-07-06 RX ADMIN — Medication 0.03 UNITS/MIN: at 20:41

## 2019-07-06 RX ADMIN — SODIUM CHLORIDE 12 UNITS/HR: 9 INJECTION, SOLUTION INTRAVENOUS at 23:08

## 2019-07-06 RX ADMIN — CEFAZOLIN 2 G: 1 INJECTION, POWDER, FOR SOLUTION INTRAVENOUS at 07:28

## 2019-07-06 RX ADMIN — SUFENTANIL CITRATE 50 MCG: 50 INJECTION, SOLUTION EPIDURAL; INTRAVENOUS at 07:41

## 2019-07-06 RX ADMIN — HEPARIN SODIUM 45000 UNITS: 1000 INJECTION, SOLUTION INTRAVENOUS; SUBCUTANEOUS at 09:10

## 2019-07-06 RX ADMIN — SUFENTANIL CITRATE 50 MCG: 50 INJECTION, SOLUTION EPIDURAL; INTRAVENOUS at 07:28

## 2019-07-06 RX ADMIN — AMINOCAPROIC ACID 5 G: 250 INJECTION, SOLUTION INTRAVENOUS at 08:15

## 2019-07-06 RX ADMIN — PROTAMINE SULFATE 250 MG: 10 INJECTION, SOLUTION INTRAVENOUS at 11:25

## 2019-07-06 RX ADMIN — IPRATROPIUM BROMIDE AND ALBUTEROL SULFATE 3 ML: 2.5; .5 SOLUTION RESPIRATORY (INHALATION) at 15:13

## 2019-07-06 RX ADMIN — ALBUMIN, HUMAN INJ 5% 500 ML: 5 SOLUTION at 15:56

## 2019-07-06 RX ADMIN — Medication 1 APPLICATION: at 18:23

## 2019-07-06 RX ADMIN — VECURONIUM BROMIDE 20 MG: 1 INJECTION, POWDER, LYOPHILIZED, FOR SOLUTION INTRAVENOUS at 07:30

## 2019-07-06 RX ADMIN — NITROGLYCERIN 5 MCG/MIN: 20 INJECTION INTRAVENOUS at 14:17

## 2019-07-06 RX ADMIN — NOREPINEPHRINE BITARTRATE 0.02 MCG/KG/MIN: 1 INJECTION, SOLUTION, CONCENTRATE INTRAVENOUS at 14:40

## 2019-07-06 RX ADMIN — CALCIUM CHLORIDE 300 MG: 100 INJECTION, SOLUTION INTRAVENOUS; INTRAVENTRICULAR at 11:52

## 2019-07-06 RX ADMIN — SODIUM CHLORIDE: 9 INJECTION, SOLUTION INTRAVENOUS at 08:10

## 2019-07-06 RX ADMIN — CEFAZOLIN 2 G: 1 INJECTION, POWDER, FOR SOLUTION INTRAVENOUS at 12:15

## 2019-07-06 RX ADMIN — VASOPRESSIN 1 UNITS: 20 INJECTION INTRAVENOUS at 11:55

## 2019-07-06 RX ADMIN — SODIUM CHLORIDE, POTASSIUM CHLORIDE, SODIUM LACTATE AND CALCIUM CHLORIDE: 600; 310; 30; 20 INJECTION, SOLUTION INTRAVENOUS at 07:43

## 2019-07-06 RX ADMIN — MORPHINE SULFATE 2 MG: 2 INJECTION, SOLUTION INTRAMUSCULAR; INTRAVENOUS at 16:33

## 2019-07-06 RX ADMIN — POTASSIUM CHLORIDE 20 MEQ: 29.8 INJECTION, SOLUTION INTRAVENOUS at 16:33

## 2019-07-06 RX ADMIN — VASOPRESSIN 0.07 UNITS/MIN: 20 INJECTION INTRAVENOUS at 11:51

## 2019-07-06 RX ADMIN — CALCIUM CHLORIDE 400 MG: 100 INJECTION, SOLUTION INTRAVENOUS; INTRAVENTRICULAR at 11:47

## 2019-07-06 RX ADMIN — VASOPRESSIN 0.5 UNITS: 20 INJECTION INTRAVENOUS at 11:39

## 2019-07-06 RX ADMIN — MIDAZOLAM HYDROCHLORIDE 3 MG: 1 INJECTION, SOLUTION INTRAMUSCULAR; INTRAVENOUS at 07:25

## 2019-07-06 RX ADMIN — SODIUM CHLORIDE, POTASSIUM CHLORIDE, SODIUM LACTATE AND CALCIUM CHLORIDE: 600; 310; 30; 20 INJECTION, SOLUTION INTRAVENOUS at 12:50

## 2019-07-06 RX ADMIN — POTASSIUM CHLORIDE AND DEXTROSE MONOHYDRATE 30 ML/HR: 150; 5 INJECTION, SOLUTION INTRAVENOUS at 14:22

## 2019-07-06 RX ADMIN — MIDAZOLAM HYDROCHLORIDE 5 MG: 1 INJECTION, SOLUTION INTRAMUSCULAR; INTRAVENOUS at 10:45

## 2019-07-06 RX ADMIN — SUFENTANIL CITRATE 50 MCG: 50 INJECTION, SOLUTION EPIDURAL; INTRAVENOUS at 09:18

## 2019-07-06 RX ADMIN — VANCOMYCIN HYDROCHLORIDE 1.5 G: 1 INJECTION, POWDER, LYOPHILIZED, FOR SOLUTION INTRAVENOUS at 07:31

## 2019-07-06 RX ADMIN — CHLORHEXIDINE GLUCONATE 15 ML: 1.2 RINSE ORAL at 18:23

## 2019-07-06 RX ADMIN — OXYCODONE HYDROCHLORIDE AND ACETAMINOPHEN 1 TABLET: 5; 325 TABLET ORAL at 22:52

## 2019-07-06 RX ADMIN — SUCCINYLCHOLINE CHLORIDE 200 MG: 20 INJECTION, SOLUTION INTRAMUSCULAR; INTRAVENOUS at 07:28

## 2019-07-06 RX ADMIN — SODIUM CHLORIDE 2 G: 9 INJECTION, SOLUTION INTRAVENOUS at 18:23

## 2019-07-06 RX ADMIN — PROPRANOLOL HYDROCHLORIDE 1 MG: 1 INJECTION INTRAVENOUS at 07:25

## 2019-07-06 RX ADMIN — NITROGLYCERIN 16.67 MCG/MIN: 5 INJECTION, SOLUTION INTRAVENOUS at 08:12

## 2019-07-06 RX ADMIN — AMINOCAPROIC ACID 1 G/HR: 250 INJECTION, SOLUTION INTRAVENOUS at 08:15

## 2019-07-06 RX ADMIN — VECURONIUM BROMIDE 20 MG: 1 INJECTION, POWDER, LYOPHILIZED, FOR SOLUTION INTRAVENOUS at 10:45

## 2019-07-06 RX ADMIN — HEPARIN SODIUM 3000 UNITS: 1000 INJECTION, SOLUTION INTRAVENOUS; SUBCUTANEOUS at 08:22

## 2019-07-06 RX ADMIN — PHENYLEPHRINE HYDROCHLORIDE 1 MCG/KG/MIN: 10 INJECTION INTRAVENOUS at 11:15

## 2019-07-06 RX ADMIN — IPRATROPIUM BROMIDE AND ALBUTEROL SULFATE 3 ML: 2.5; .5 SOLUTION RESPIRATORY (INHALATION) at 20:20

## 2019-07-06 RX ADMIN — SUFENTANIL CITRATE 50 MCG: 50 INJECTION, SOLUTION EPIDURAL; INTRAVENOUS at 09:25

## 2019-07-06 RX ADMIN — Medication 0.08 UNITS/MIN: at 14:16

## 2019-07-06 RX ADMIN — ETOMIDATE 20 MG: 2 INJECTION, SOLUTION INTRAVENOUS at 07:28

## 2019-07-06 RX ADMIN — SUFENTANIL CITRATE 50 MCG: 50 INJECTION, SOLUTION EPIDURAL; INTRAVENOUS at 09:08

## 2019-07-06 RX ADMIN — VASOPRESSIN 1 UNITS: 20 INJECTION INTRAVENOUS at 11:48

## 2019-07-06 RX ADMIN — SODIUM CHLORIDE 1000 ML: 9 INJECTION, SOLUTION INTRAVENOUS at 14:30

## 2019-07-06 RX ADMIN — SODIUM CHLORIDE, POTASSIUM CHLORIDE, SODIUM LACTATE AND CALCIUM CHLORIDE: 600; 310; 30; 20 INJECTION, SOLUTION INTRAVENOUS at 07:24

## 2019-07-06 RX ADMIN — ALBUMIN HUMAN 500 ML: 0.05 INJECTION, SOLUTION INTRAVENOUS at 15:56

## 2019-07-06 RX ADMIN — VASOPRESSIN 1 UNITS: 20 INJECTION INTRAVENOUS at 11:42

## 2019-07-06 RX ADMIN — Medication 80 MCG: at 07:34

## 2019-07-06 RX ADMIN — ROCURONIUM BROMIDE 10 MG: 10 INJECTION INTRAVENOUS at 07:28

## 2019-07-06 RX ADMIN — MIDAZOLAM HYDROCHLORIDE 2 MG: 1 INJECTION, SOLUTION INTRAMUSCULAR; INTRAVENOUS at 07:10

## 2019-07-06 RX ADMIN — SODIUM CHLORIDE, POTASSIUM CHLORIDE, SODIUM LACTATE AND CALCIUM CHLORIDE 1000 ML: 600; 310; 30; 20 INJECTION, SOLUTION INTRAVENOUS at 14:00

## 2019-07-06 RX ADMIN — AMINOCAPROIC ACID 1 G/HR: 250 INJECTION, SOLUTION INTRAVENOUS at 13:34

## 2019-07-06 RX ADMIN — POTASSIUM CHLORIDE 20 MEQ: 29.8 INJECTION, SOLUTION INTRAVENOUS at 20:41

## 2019-07-06 RX ADMIN — SUFENTANIL CITRATE 50 MCG: 50 INJECTION, SOLUTION EPIDURAL; INTRAVENOUS at 08:30

## 2019-07-06 RX ADMIN — Medication 160 MCG: at 08:05

## 2019-07-06 RX ADMIN — ONDANSETRON 4 MG: 2 INJECTION INTRAMUSCULAR; INTRAVENOUS at 18:16

## 2019-07-06 RX ADMIN — SUFENTANIL CITRATE 50 MCG: 50 INJECTION, SOLUTION EPIDURAL; INTRAVENOUS at 08:23

## 2019-07-06 NOTE — ANESTHESIA PROCEDURE NOTES
Central Line    Pre-sedation assessment completed: 7/6/2019 7:00 AM    Patient reassessed immediately prior to procedure    Patient location during procedure: OR  Start time: 7/6/2019 7:32 AM  Stop Time:7/6/2019 7:40 AM  Indications: central pressure monitoring, vascular access and MD/Surgeon request  Staff  Anesthesiologist: Jonny Terry MD  Preanesthetic Checklist  Completed: patient identified, site marked, surgical consent, pre-op evaluation, timeout performed, IV checked, risks and benefits discussed and monitors and equipment checked  Central Line Prep  Sterile Tech:cap, gloves, gown, mask and sterile barriers  Prep: chloraprep  Patient monitoring: blood pressure monitoring, continuous pulse oximetry and EKG  Central Line Procedure  Laterality:right  Location:internal jugular  Catheter Type:Cordis, MAC and Denver-Amy  Guidance:ultrasound guided  PROCEDURE NOTE/ULTRASOUND INTERPRETATION.  Using ultrasound guidance the potential vascular sites for insertion of the catheter were visualized to determine the patency of the vessel to be used for vascular access.  After selecting the appropriate site for insertion, the needle was visualized under ultrasound being inserted into the internal jugular vein, followed by ultrasound confirmation of wire and catheter placement. There were no abnormalities seen on ultrasound; an image was taken; and the patient tolerated the procedure with no complications. Images: still images not obtained  Assessment  Post procedure:line sutured, biopatch applied and secured with tape  Assessement:blood return through all ports and free fluid flow  Complications:no  Patient Tolerance:patient tolerated the procedure well with no apparent complications  Additional Notes  Denver-Amy catheter placed at 52 cm

## 2019-07-06 NOTE — ANESTHESIA POSTPROCEDURE EVALUATION
Patient: Carlos A Boyer Jr.    Procedure Summary     Date:  07/06/19 Room / Location:   [facility]  /  [facility]    Anesthesia Start:  0708 Anesthesia Stop:  1344    Procedure:  CABG X2 WITH LIMA, LEFT LEG OVH, AND PLACEMENT OF LEFT FEMORAL ARTERIAL LINE (N/A Chest) Diagnosis:       Coronary artery disease involving native coronary artery of native heart with angina pectoris (CMS/HCC)      (Coronary artery disease involving native coronary artery of native heart with angina pectoris (CMS/HCC) [I25.119])    Surgeon:  Steven Tang MD Provider:  Ranjit Burgess CRNA    Anesthesia Type:  Not recorded ASA Status:  4 - Emergent          Anesthesia Type: No value filed.  Last vitals  BP   134/87 (07/06/19 0450)   Temp   98.2 °F (36.8 °C) (07/05/19 2005)   Pulse   64 (07/06/19 0450)   Resp   22 (07/06/19 0350)     SpO2   96 % (07/06/19 0450)     Post Anesthesia Care and Evaluation    Patient location during evaluation: PACU  Patient participation: complete - patient participated  Level of consciousness: awake and alert  Pain management: adequate  Airway patency: patent  Anesthetic complications: No anesthetic complications    Cardiovascular status: acceptable  Respiratory status: acceptable  Hydration status: acceptable

## 2019-07-06 NOTE — ANESTHESIA PROCEDURE NOTES
Procedure Performed: Emergent/Open-Heart Anesthesia CARLOS       Start Time:  7/6/2019 7:42 AM       End Time:   7/6/2019 7:51 AM    Preanesthesia Checklist:  Patient identified, IV assessed, risks and benefits discussed, monitors and equipment assessed, procedure being performed at surgeon's request and anesthesia consent obtained.    General Procedure Information  CARLOS Placed for monitoring purposes only -- This is not a diagnostic CARLOS  Diagnostic Indications for Echo:  assessment of surgical repair and hemodynamic monitoring  Physician Requesting Echo: Steven Tang MD  Location performed:  OR  Intubated  Bite block placed  Heart visualized  Probe Insertion:  Easy  Probe Type:  Multiplane  Modalities:  2D only, color flow mapping, continuous wave Doppler and pulse wave Doppler        Anesthesia Information  Performed Personally  Anesthesiologist:  Jonny Terry MD      Echocardiogram Comments:       Probe placed atraumatically on first attempt

## 2019-07-06 NOTE — ANESTHESIA PROCEDURE NOTES
Arterial Line    Pre-sedation assessment completed: 7/6/2019 7:00 AM    Patient reassessed immediately prior to procedure    Patient location during procedure: OR  Start time: 7/6/2019 7:12 AM  Stop Time:7/6/2019 7:14 AM       Performed By   Anesthesiologist: Jonny Terry MD  Preanesthetic Checklist  Completed: patient identified, site marked, surgical consent, pre-op evaluation, timeout performed, IV checked, risks and benefits discussed and monitors and equipment checked  Arterial Line Prep   Sterile Tech: cap, gloves and sterile barriers  Prep: ChloraPrep  Patient monitoring: EKG, continuous pulse oximetry and blood pressure monitoring  Arterial Line Procedure   Laterality:left  Location:  radial artery  Catheter size: 20 G   Guidance: ultrasound guided  PROCEDURE NOTE/ULTRASOUND INTERPRETATION.  Using ultrasound guidance the potential vascular sites for insertion of the catheter were visualized to determine the patency of the vessel to be used for vascular access.  After selecting the appropriate site for insertion, the needle was visualized under ultrasound being inserted into the radial artery, followed by ultrasound confirmation of wire and catheter placement. There were no abnormalities seen on ultrasound; an image was taken; and the patient tolerated the procedure with no complications.   Number of attempts: 1  Successful placement: yes  Post Assessment   Dressing Type: wrist guard applied, secured with tape and occlusive dressing applied.   Complications no  Circ/Move/Sens Assessment: normal and unchanged.   Patient Tolerance: patient tolerated the procedure well with no apparent complications

## 2019-07-06 NOTE — ANESTHESIA PROCEDURE NOTES
Airway  Urgency: elective    Date/Time: 7/6/2019 7:25 AM  Airway not difficult    General Information and Staff    Patient location during procedure: OR  CRNA: Ranjit Burgess CRNA    Indications and Patient Condition  Indications for airway management: airway protection    Preoxygenated: yes  MILS maintained throughout  Mask difficulty assessment: 1 - vent by mask    Final Airway Details  Final airway type: endotracheal airway      Successful airway: ETT  Cuffed: yes   Successful intubation technique: direct laryngoscopy  Facilitating devices/methods: intubating stylet  Endotracheal tube insertion site: oral  Blade: Balderas  Blade size: 2  ETT size (mm): 8.0  Cormack-Lehane Classification: grade IIa - partial view of glottis  Placement verified by: chest auscultation and capnometry   Cuff volume (mL): 7  Measured from: lips  ETT to lips (cm): 23  Number of attempts at approach: 1

## 2019-07-07 ENCOUNTER — APPOINTMENT (OUTPATIENT)
Dept: GENERAL RADIOLOGY | Facility: HOSPITAL | Age: 59
End: 2019-07-07

## 2019-07-07 LAB
ABO + RH BLD: NORMAL
ALBUMIN SERPL-MCNC: 3.1 G/DL (ref 3.5–5)
ANION GAP SERPL CALCULATED.3IONS-SCNC: 7 MMOL/L (ref 4–13)
ANION GAP SERPL CALCULATED.3IONS-SCNC: 8 MMOL/L (ref 4–13)
ARTERIAL PATENCY WRIST A: ABNORMAL
ATMOSPHERIC PRESS: 749 MMHG
BASE EXCESS BLDA CALC-SCNC: -2.9 MMOL/L (ref 0–2)
BASOPHILS # BLD AUTO: 0.02 10*3/MM3 (ref 0–0.2)
BASOPHILS NFR BLD AUTO: 0.2 % (ref 0–2)
BDY SITE: ABNORMAL
BH BB BLOOD EXPIRATION DATE: NORMAL
BH BB BLOOD TYPE BARCODE: 8400
BH BB DISPENSE STATUS: NORMAL
BH BB PRODUCT CODE: NORMAL
BH BB UNIT NUMBER: NORMAL
BODY TEMPERATURE: 37 C
BUN BLD-MCNC: 12 MG/DL (ref 5–21)
BUN BLD-MCNC: 15 MG/DL (ref 5–21)
BUN/CREAT SERPL: 13.2 (ref 7–25)
BUN/CREAT SERPL: 14.9 (ref 7–25)
CALCIUM SPEC-SCNC: 8.4 MG/DL (ref 8.4–10.4)
CALCIUM SPEC-SCNC: 8.6 MG/DL (ref 8.4–10.4)
CHLORIDE SERPL-SCNC: 101 MMOL/L (ref 98–110)
CHLORIDE SERPL-SCNC: 107 MMOL/L (ref 98–110)
CO2 SERPL-SCNC: 22 MMOL/L (ref 24–31)
CO2 SERPL-SCNC: 24 MMOL/L (ref 24–31)
CREAT BLD-MCNC: 0.91 MG/DL (ref 0.5–1.4)
CREAT BLD-MCNC: 1.01 MG/DL (ref 0.5–1.4)
DEPRECATED RDW RBC AUTO: 40.2 FL (ref 40–54)
EOSINOPHIL # BLD AUTO: 0.05 10*3/MM3 (ref 0–0.7)
EOSINOPHIL NFR BLD AUTO: 0.5 % (ref 0–4)
ERYTHROCYTE [DISTWIDTH] IN BLOOD BY AUTOMATED COUNT: 12.6 % (ref 12–15)
GFR SERPL CREATININE-BSD FRML MDRD: 75 ML/MIN/1.73
GFR SERPL CREATININE-BSD FRML MDRD: 85 ML/MIN/1.73
GLUCOSE BLD-MCNC: 161 MG/DL (ref 70–100)
GLUCOSE BLD-MCNC: 183 MG/DL (ref 70–100)
GLUCOSE BLDC GLUCOMTR-MCNC: 117 MG/DL (ref 70–130)
GLUCOSE BLDC GLUCOMTR-MCNC: 119 MG/DL (ref 70–130)
GLUCOSE BLDC GLUCOMTR-MCNC: 125 MG/DL (ref 70–130)
GLUCOSE BLDC GLUCOMTR-MCNC: 131 MG/DL (ref 70–130)
GLUCOSE BLDC GLUCOMTR-MCNC: 132 MG/DL (ref 70–130)
GLUCOSE BLDC GLUCOMTR-MCNC: 142 MG/DL (ref 70–130)
GLUCOSE BLDC GLUCOMTR-MCNC: 148 MG/DL (ref 70–130)
GLUCOSE BLDC GLUCOMTR-MCNC: 161 MG/DL (ref 70–130)
GLUCOSE BLDC GLUCOMTR-MCNC: 170 MG/DL (ref 70–130)
GLUCOSE BLDC GLUCOMTR-MCNC: 192 MG/DL (ref 70–130)
GLUCOSE BLDC GLUCOMTR-MCNC: 197 MG/DL (ref 70–130)
GLUCOSE BLDC GLUCOMTR-MCNC: 201 MG/DL (ref 70–130)
GLUCOSE BLDC GLUCOMTR-MCNC: 235 MG/DL (ref 70–130)
GLUCOSE BLDC GLUCOMTR-MCNC: 235 MG/DL (ref 70–130)
GLUCOSE BLDC GLUCOMTR-MCNC: 79 MG/DL (ref 70–130)
HCO3 BLDA-SCNC: 22.3 MMOL/L (ref 20–26)
HCT VFR BLD AUTO: 32.1 % (ref 40–52)
HGB BLD-MCNC: 10.9 G/DL (ref 14–18)
INR PPP: 1.03 (ref 0.91–1.09)
LYMPHOCYTES # BLD AUTO: 0.93 10*3/MM3 (ref 0.72–4.86)
LYMPHOCYTES NFR BLD AUTO: 8.7 % (ref 15–45)
Lab: 8834
MCH RBC QN AUTO: 29.6 PG (ref 28–32)
MCHC RBC AUTO-ENTMCNC: 34 G/DL (ref 33–36)
MCV RBC AUTO: 87.2 FL (ref 82–95)
MODALITY: ABNORMAL
MONOCYTES # BLD AUTO: 0.76 10*3/MM3 (ref 0.19–1.3)
MONOCYTES NFR BLD AUTO: 7.1 % (ref 4–12)
NEUTROPHILS # BLD AUTO: 8.92 10*3/MM3 (ref 1.87–8.4)
NEUTROPHILS NFR BLD AUTO: 83.1 % (ref 39–78)
PCO2 BLDA: 39.6 MM HG (ref 35–45)
PH BLDA: 7.36 PH UNITS (ref 7.35–7.45)
PHOSPHATE SERPL-MCNC: 3 MG/DL (ref 2.5–4.5)
PLATELET # BLD AUTO: 94 10*3/MM3 (ref 130–400)
PMV BLD AUTO: 11.6 FL (ref 6–12)
PO2 BLDA: 71.1 MM HG (ref 83–108)
POTASSIUM BLD-SCNC: 4.6 MMOL/L (ref 3.5–5.3)
POTASSIUM BLD-SCNC: 5.6 MMOL/L (ref 3.5–5.3)
PROTHROMBIN TIME: 13.8 SECONDS (ref 11.9–14.6)
RBC # BLD AUTO: 3.68 10*6/MM3 (ref 4.8–5.9)
SAO2 % BLDCOA: 93.3 % (ref 94–99)
SODIUM BLD-SCNC: 133 MMOL/L (ref 135–145)
SODIUM BLD-SCNC: 136 MMOL/L (ref 135–145)
UNIT  ABO: NORMAL
UNIT  RH: NORMAL
VENTILATOR MODE: ABNORMAL
WBC NRBC COR # BLD: 10.72 10*3/MM3 (ref 4.8–10.8)

## 2019-07-07 PROCEDURE — 94799 UNLISTED PULMONARY SVC/PX: CPT

## 2019-07-07 PROCEDURE — 63710000001 INSULIN DETEMIR PER 5 UNITS: Performed by: THORACIC SURGERY (CARDIOTHORACIC VASCULAR SURGERY)

## 2019-07-07 PROCEDURE — 71045 X-RAY EXAM CHEST 1 VIEW: CPT

## 2019-07-07 PROCEDURE — 25010000002 ENOXAPARIN PER 10 MG: Performed by: THORACIC SURGERY (CARDIOTHORACIC VASCULAR SURGERY)

## 2019-07-07 PROCEDURE — 85610 PROTHROMBIN TIME: CPT | Performed by: THORACIC SURGERY (CARDIOTHORACIC VASCULAR SURGERY)

## 2019-07-07 PROCEDURE — 97161 PT EVAL LOW COMPLEX 20 MIN: CPT | Performed by: PHYSICAL THERAPIST

## 2019-07-07 PROCEDURE — 93005 ELECTROCARDIOGRAM TRACING: CPT | Performed by: THORACIC SURGERY (CARDIOTHORACIC VASCULAR SURGERY)

## 2019-07-07 PROCEDURE — 63710000001 INSULIN LISPRO (HUMAN) PER 5 UNITS: Performed by: THORACIC SURGERY (CARDIOTHORACIC VASCULAR SURGERY)

## 2019-07-07 PROCEDURE — 93010 ELECTROCARDIOGRAM REPORT: CPT | Performed by: INTERNAL MEDICINE

## 2019-07-07 PROCEDURE — 25010000002 CEFAZOLIN PER 500 MG: Performed by: THORACIC SURGERY (CARDIOTHORACIC VASCULAR SURGERY)

## 2019-07-07 PROCEDURE — 25010000002 ONDANSETRON PER 1 MG: Performed by: THORACIC SURGERY (CARDIOTHORACIC VASCULAR SURGERY)

## 2019-07-07 PROCEDURE — 85025 COMPLETE CBC W/AUTO DIFF WBC: CPT | Performed by: THORACIC SURGERY (CARDIOTHORACIC VASCULAR SURGERY)

## 2019-07-07 PROCEDURE — 25010000002 FUROSEMIDE PER 20 MG: Performed by: THORACIC SURGERY (CARDIOTHORACIC VASCULAR SURGERY)

## 2019-07-07 PROCEDURE — 80069 RENAL FUNCTION PANEL: CPT | Performed by: THORACIC SURGERY (CARDIOTHORACIC VASCULAR SURGERY)

## 2019-07-07 PROCEDURE — 82962 GLUCOSE BLOOD TEST: CPT

## 2019-07-07 PROCEDURE — 82803 BLOOD GASES ANY COMBINATION: CPT

## 2019-07-07 PROCEDURE — 80048 BASIC METABOLIC PNL TOTAL CA: CPT | Performed by: THORACIC SURGERY (CARDIOTHORACIC VASCULAR SURGERY)

## 2019-07-07 RX ORDER — POLYETHYLENE GLYCOL 3350 17 G/17G
17 POWDER, FOR SOLUTION ORAL DAILY
Status: DISCONTINUED | OUTPATIENT
Start: 2019-07-07 | End: 2019-07-10 | Stop reason: HOSPADM

## 2019-07-07 RX ORDER — LEVETIRACETAM 500 MG/1
2000 TABLET ORAL NIGHTLY
Status: DISCONTINUED | OUTPATIENT
Start: 2019-07-07 | End: 2019-07-10 | Stop reason: HOSPADM

## 2019-07-07 RX ORDER — NICOTINE POLACRILEX 4 MG
15 LOZENGE BUCCAL
Status: DISCONTINUED | OUTPATIENT
Start: 2019-07-07 | End: 2019-07-10 | Stop reason: HOSPADM

## 2019-07-07 RX ORDER — LIDOCAINE 50 MG/G
2 PATCH TOPICAL
Status: DISCONTINUED | OUTPATIENT
Start: 2019-07-07 | End: 2019-07-10 | Stop reason: HOSPADM

## 2019-07-07 RX ORDER — FUROSEMIDE 10 MG/ML
20 INJECTION INTRAMUSCULAR; INTRAVENOUS
Status: DISCONTINUED | OUTPATIENT
Start: 2019-07-07 | End: 2019-07-10 | Stop reason: HOSPADM

## 2019-07-07 RX ORDER — DEXTROSE MONOHYDRATE 25 G/50ML
25 INJECTION, SOLUTION INTRAVENOUS
Status: DISCONTINUED | OUTPATIENT
Start: 2019-07-07 | End: 2019-07-10 | Stop reason: HOSPADM

## 2019-07-07 RX ORDER — LEVETIRACETAM 500 MG/1
1500 TABLET ORAL DAILY
Status: DISCONTINUED | OUTPATIENT
Start: 2019-07-07 | End: 2019-07-10 | Stop reason: HOSPADM

## 2019-07-07 RX ORDER — PANTOPRAZOLE SODIUM 40 MG/1
40 TABLET, DELAYED RELEASE ORAL
Status: DISCONTINUED | OUTPATIENT
Start: 2019-07-07 | End: 2019-07-10 | Stop reason: HOSPADM

## 2019-07-07 RX ORDER — LAMOTRIGINE 100 MG/1
200 TABLET ORAL 2 TIMES DAILY
Status: DISCONTINUED | OUTPATIENT
Start: 2019-07-07 | End: 2019-07-10 | Stop reason: HOSPADM

## 2019-07-07 RX ORDER — POTASSIUM CHLORIDE 750 MG/1
20 CAPSULE, EXTENDED RELEASE ORAL 2 TIMES DAILY WITH MEALS
Status: DISCONTINUED | OUTPATIENT
Start: 2019-07-07 | End: 2019-07-10 | Stop reason: HOSPADM

## 2019-07-07 RX ORDER — PREGABALIN 25 MG/1
25 CAPSULE ORAL EVERY 12 HOURS SCHEDULED
Status: DISCONTINUED | OUTPATIENT
Start: 2019-07-07 | End: 2019-07-10 | Stop reason: HOSPADM

## 2019-07-07 RX ADMIN — METOPROLOL TARTRATE 12.5 MG: 25 TABLET, FILM COATED ORAL at 20:25

## 2019-07-07 RX ADMIN — SODIUM CHLORIDE 20 UNITS/HR: 9 INJECTION, SOLUTION INTRAVENOUS at 05:43

## 2019-07-07 RX ADMIN — PREGABALIN 25 MG: 25 CAPSULE ORAL at 20:24

## 2019-07-07 RX ADMIN — IPRATROPIUM BROMIDE AND ALBUTEROL SULFATE 3 ML: 2.5; .5 SOLUTION RESPIRATORY (INHALATION) at 07:08

## 2019-07-07 RX ADMIN — POLYETHYLENE GLYCOL 3350 17 G: 17 POWDER, FOR SOLUTION ORAL at 11:33

## 2019-07-07 RX ADMIN — IPRATROPIUM BROMIDE AND ALBUTEROL SULFATE 3 ML: 2.5; .5 SOLUTION RESPIRATORY (INHALATION) at 11:27

## 2019-07-07 RX ADMIN — CHLORHEXIDINE GLUCONATE 15 ML: 1.2 RINSE ORAL at 05:46

## 2019-07-07 RX ADMIN — CHLORHEXIDINE GLUCONATE 15 ML: 1.2 RINSE ORAL at 18:30

## 2019-07-07 RX ADMIN — POTASSIUM CHLORIDE 20 MEQ: 750 CAPSULE, EXTENDED RELEASE ORAL at 18:30

## 2019-07-07 RX ADMIN — SODIUM CHLORIDE 2 G: 9 INJECTION, SOLUTION INTRAVENOUS at 01:58

## 2019-07-07 RX ADMIN — AMIODARONE HYDROCHLORIDE 400 MG: 200 TABLET ORAL at 20:25

## 2019-07-07 RX ADMIN — METFORMIN HYDROCHLORIDE 1000 MG: 500 TABLET ORAL at 18:30

## 2019-07-07 RX ADMIN — FUROSEMIDE 20 MG: 10 INJECTION, SOLUTION INTRAMUSCULAR; INTRAVENOUS at 18:30

## 2019-07-07 RX ADMIN — PREGABALIN 25 MG: 25 CAPSULE ORAL at 11:31

## 2019-07-07 RX ADMIN — PANTOPRAZOLE SODIUM 40 MG: 40 TABLET, DELAYED RELEASE ORAL at 11:34

## 2019-07-07 RX ADMIN — LIDOCAINE 2 PATCH: 50 PATCH TOPICAL at 09:12

## 2019-07-07 RX ADMIN — OXYCODONE HYDROCHLORIDE AND ACETAMINOPHEN 1 TABLET: 10; 325 TABLET ORAL at 09:07

## 2019-07-07 RX ADMIN — Medication 1 APPLICATION: at 19:33

## 2019-07-07 RX ADMIN — OXYCODONE HYDROCHLORIDE AND ACETAMINOPHEN 1 TABLET: 10; 325 TABLET ORAL at 22:34

## 2019-07-07 RX ADMIN — SODIUM CHLORIDE 2 G: 9 INJECTION, SOLUTION INTRAVENOUS at 09:23

## 2019-07-07 RX ADMIN — INSULIN DETEMIR 25 UNITS: 100 INJECTION, SOLUTION SUBCUTANEOUS at 11:30

## 2019-07-07 RX ADMIN — IPRATROPIUM BROMIDE AND ALBUTEROL SULFATE 3 ML: 2.5; .5 SOLUTION RESPIRATORY (INHALATION) at 19:54

## 2019-07-07 RX ADMIN — METFORMIN HYDROCHLORIDE 1000 MG: 500 TABLET ORAL at 11:32

## 2019-07-07 RX ADMIN — ONDANSETRON 4 MG: 2 INJECTION INTRAMUSCULAR; INTRAVENOUS at 19:30

## 2019-07-07 RX ADMIN — POTASSIUM CHLORIDE 20 MEQ: 750 CAPSULE, EXTENDED RELEASE ORAL at 11:30

## 2019-07-07 RX ADMIN — IPRATROPIUM BROMIDE AND ALBUTEROL SULFATE 3 ML: 2.5; .5 SOLUTION RESPIRATORY (INHALATION) at 15:28

## 2019-07-07 RX ADMIN — FUROSEMIDE 20 MG: 10 INJECTION, SOLUTION INTRAMUSCULAR; INTRAVENOUS at 11:30

## 2019-07-07 RX ADMIN — INSULIN DETEMIR 25 UNITS: 100 INJECTION, SOLUTION SUBCUTANEOUS at 20:24

## 2019-07-07 RX ADMIN — BISACODYL 10 MG: 5 TABLET ORAL at 09:08

## 2019-07-07 RX ADMIN — NOREPINEPHRINE BITARTRATE 0.02 MCG/KG/MIN: 1 INJECTION, SOLUTION, CONCENTRATE INTRAVENOUS at 06:13

## 2019-07-07 RX ADMIN — LEVETIRACETAM 1500 MG: 500 TABLET, FILM COATED ORAL at 11:32

## 2019-07-07 RX ADMIN — ASPIRIN 162 MG: 81 TABLET, CHEWABLE ORAL at 09:08

## 2019-07-07 RX ADMIN — METOPROLOL TARTRATE 12.5 MG: 25 TABLET, FILM COATED ORAL at 09:09

## 2019-07-07 RX ADMIN — LEVETIRACETAM 2000 MG: 500 TABLET, FILM COATED ORAL at 20:25

## 2019-07-07 RX ADMIN — INSULIN LISPRO 4 UNITS: 100 INJECTION, SOLUTION INTRAVENOUS; SUBCUTANEOUS at 20:23

## 2019-07-07 RX ADMIN — AMIODARONE HYDROCHLORIDE 400 MG: 200 TABLET ORAL at 09:08

## 2019-07-07 RX ADMIN — OXYCODONE HYDROCHLORIDE AND ACETAMINOPHEN 1 TABLET: 10; 325 TABLET ORAL at 15:21

## 2019-07-07 RX ADMIN — SODIUM CHLORIDE 2 G: 9 INJECTION, SOLUTION INTRAVENOUS at 18:30

## 2019-07-07 RX ADMIN — ENOXAPARIN SODIUM 30 MG: 30 INJECTION SUBCUTANEOUS at 09:23

## 2019-07-07 RX ADMIN — BISACODYL 10 MG: 5 TABLET ORAL at 20:25

## 2019-07-07 RX ADMIN — OXYCODONE HYDROCHLORIDE AND ACETAMINOPHEN 1 TABLET: 10; 325 TABLET ORAL at 01:58

## 2019-07-07 RX ADMIN — Medication 1 APPLICATION: at 06:35

## 2019-07-07 RX ADMIN — LAMOTRIGINE 200 MG: 100 TABLET ORAL at 20:24

## 2019-07-07 RX ADMIN — SODIUM CHLORIDE 20 UNITS/HR: 9 INJECTION, SOLUTION INTRAVENOUS at 11:32

## 2019-07-07 RX ADMIN — LAMOTRIGINE 200 MG: 100 TABLET ORAL at 11:31

## 2019-07-07 RX ADMIN — OXYCODONE HYDROCHLORIDE AND ACETAMINOPHEN 1 TABLET: 10; 325 TABLET ORAL at 19:30

## 2019-07-08 ENCOUNTER — APPOINTMENT (OUTPATIENT)
Dept: GENERAL RADIOLOGY | Facility: HOSPITAL | Age: 59
End: 2019-07-08

## 2019-07-08 LAB
ANION GAP SERPL CALCULATED.3IONS-SCNC: 10 MMOL/L (ref 4–13)
BUN BLD-MCNC: 15 MG/DL (ref 5–21)
BUN/CREAT SERPL: 16.1 (ref 7–25)
CALCIUM SPEC-SCNC: 9.2 MG/DL (ref 8.4–10.4)
CHLORIDE SERPL-SCNC: 93 MMOL/L (ref 98–110)
CO2 SERPL-SCNC: 27 MMOL/L (ref 24–31)
CREAT BLD-MCNC: 0.93 MG/DL (ref 0.5–1.4)
DEPRECATED RDW RBC AUTO: 41.2 FL (ref 40–54)
ERYTHROCYTE [DISTWIDTH] IN BLOOD BY AUTOMATED COUNT: 12.8 % (ref 12–15)
GFR SERPL CREATININE-BSD FRML MDRD: 83 ML/MIN/1.73
GLUCOSE BLD-MCNC: 257 MG/DL (ref 70–100)
GLUCOSE BLDC GLUCOMTR-MCNC: 231 MG/DL (ref 70–130)
GLUCOSE BLDC GLUCOMTR-MCNC: 270 MG/DL (ref 70–130)
GLUCOSE BLDC GLUCOMTR-MCNC: 293 MG/DL (ref 70–130)
GLUCOSE BLDC GLUCOMTR-MCNC: 300 MG/DL (ref 70–130)
HCT VFR BLD AUTO: 30.7 % (ref 40–52)
HGB BLD-MCNC: 10.3 G/DL (ref 14–18)
LV EF 2D ECHO EST: 60 %
MCH RBC QN AUTO: 29.6 PG (ref 28–32)
MCHC RBC AUTO-ENTMCNC: 33.6 G/DL (ref 33–36)
MCV RBC AUTO: 88.2 FL (ref 82–95)
PLATELET # BLD AUTO: 90 10*3/MM3 (ref 130–400)
PMV BLD AUTO: 11.5 FL (ref 6–12)
POTASSIUM BLD-SCNC: 4.6 MMOL/L (ref 3.5–5.3)
RBC # BLD AUTO: 3.48 10*6/MM3 (ref 4.8–5.9)
SODIUM BLD-SCNC: 130 MMOL/L (ref 135–145)
WBC NRBC COR # BLD: 9.76 10*3/MM3 (ref 4.8–10.8)

## 2019-07-08 PROCEDURE — 63710000001 INSULIN DETEMIR PER 5 UNITS: Performed by: NURSE PRACTITIONER

## 2019-07-08 PROCEDURE — 25010000002 ONDANSETRON PER 1 MG: Performed by: THORACIC SURGERY (CARDIOTHORACIC VASCULAR SURGERY)

## 2019-07-08 PROCEDURE — 63710000001 INSULIN LISPRO (HUMAN) PER 5 UNITS: Performed by: THORACIC SURGERY (CARDIOTHORACIC VASCULAR SURGERY)

## 2019-07-08 PROCEDURE — 97116 GAIT TRAINING THERAPY: CPT

## 2019-07-08 PROCEDURE — 25010000002 CEFAZOLIN PER 500 MG: Performed by: THORACIC SURGERY (CARDIOTHORACIC VASCULAR SURGERY)

## 2019-07-08 PROCEDURE — 25010000002 AMIODARONE IN DEXTROSE 5% 150-4.21 MG/100ML-% SOLUTION: Performed by: THORACIC SURGERY (CARDIOTHORACIC VASCULAR SURGERY)

## 2019-07-08 PROCEDURE — 94799 UNLISTED PULMONARY SVC/PX: CPT

## 2019-07-08 PROCEDURE — 93010 ELECTROCARDIOGRAM REPORT: CPT | Performed by: INTERNAL MEDICINE

## 2019-07-08 PROCEDURE — 25010000002 AMIODARONE IN DEXTROSE 5% 360-4.14 MG/200ML-% SOLUTION: Performed by: THORACIC SURGERY (CARDIOTHORACIC VASCULAR SURGERY)

## 2019-07-08 PROCEDURE — 82962 GLUCOSE BLOOD TEST: CPT

## 2019-07-08 PROCEDURE — 71045 X-RAY EXAM CHEST 1 VIEW: CPT

## 2019-07-08 PROCEDURE — 93005 ELECTROCARDIOGRAM TRACING: CPT | Performed by: THORACIC SURGERY (CARDIOTHORACIC VASCULAR SURGERY)

## 2019-07-08 PROCEDURE — 85027 COMPLETE CBC AUTOMATED: CPT | Performed by: THORACIC SURGERY (CARDIOTHORACIC VASCULAR SURGERY)

## 2019-07-08 PROCEDURE — 25010000002 FUROSEMIDE PER 20 MG: Performed by: THORACIC SURGERY (CARDIOTHORACIC VASCULAR SURGERY)

## 2019-07-08 PROCEDURE — 94760 N-INVAS EAR/PLS OXIMETRY 1: CPT

## 2019-07-08 PROCEDURE — 80048 BASIC METABOLIC PNL TOTAL CA: CPT | Performed by: THORACIC SURGERY (CARDIOTHORACIC VASCULAR SURGERY)

## 2019-07-08 RX ADMIN — AMIODARONE HYDROCHLORIDE 400 MG: 200 TABLET ORAL at 20:14

## 2019-07-08 RX ADMIN — OXYCODONE HYDROCHLORIDE AND ACETAMINOPHEN 1 TABLET: 10; 325 TABLET ORAL at 20:15

## 2019-07-08 RX ADMIN — LEVETIRACETAM 2000 MG: 500 TABLET, FILM COATED ORAL at 20:14

## 2019-07-08 RX ADMIN — PREGABALIN 25 MG: 25 CAPSULE ORAL at 09:02

## 2019-07-08 RX ADMIN — OXYCODONE HYDROCHLORIDE AND ACETAMINOPHEN 1 TABLET: 5; 325 TABLET ORAL at 13:37

## 2019-07-08 RX ADMIN — METOPROLOL TARTRATE 12.5 MG: 25 TABLET, FILM COATED ORAL at 09:01

## 2019-07-08 RX ADMIN — FUROSEMIDE 20 MG: 10 INJECTION, SOLUTION INTRAMUSCULAR; INTRAVENOUS at 09:00

## 2019-07-08 RX ADMIN — OXYCODONE HYDROCHLORIDE AND ACETAMINOPHEN 1 TABLET: 10; 325 TABLET ORAL at 01:57

## 2019-07-08 RX ADMIN — METOPROLOL TARTRATE 25 MG: 25 TABLET, FILM COATED ORAL at 20:15

## 2019-07-08 RX ADMIN — INSULIN LISPRO 6 UNITS: 100 INJECTION, SOLUTION INTRAVENOUS; SUBCUTANEOUS at 09:01

## 2019-07-08 RX ADMIN — INSULIN LISPRO 4 UNITS: 100 INJECTION, SOLUTION INTRAVENOUS; SUBCUTANEOUS at 20:19

## 2019-07-08 RX ADMIN — BISACODYL 10 MG: 5 TABLET ORAL at 09:01

## 2019-07-08 RX ADMIN — BISACODYL 10 MG: 5 TABLET ORAL at 20:14

## 2019-07-08 RX ADMIN — PANTOPRAZOLE SODIUM 40 MG: 40 TABLET, DELAYED RELEASE ORAL at 06:23

## 2019-07-08 RX ADMIN — LEVETIRACETAM 1500 MG: 500 TABLET, FILM COATED ORAL at 09:01

## 2019-07-08 RX ADMIN — IPRATROPIUM BROMIDE AND ALBUTEROL SULFATE 3 ML: 2.5; .5 SOLUTION RESPIRATORY (INHALATION) at 10:36

## 2019-07-08 RX ADMIN — PREGABALIN 25 MG: 25 CAPSULE ORAL at 20:21

## 2019-07-08 RX ADMIN — FUROSEMIDE 20 MG: 10 INJECTION, SOLUTION INTRAMUSCULAR; INTRAVENOUS at 17:27

## 2019-07-08 RX ADMIN — POTASSIUM CHLORIDE 20 MEQ: 750 CAPSULE, EXTENDED RELEASE ORAL at 09:01

## 2019-07-08 RX ADMIN — AMIODARONE HYDROCHLORIDE 400 MG: 200 TABLET ORAL at 09:01

## 2019-07-08 RX ADMIN — Medication 1 APPLICATION: at 06:23

## 2019-07-08 RX ADMIN — LAMOTRIGINE 200 MG: 100 TABLET ORAL at 09:01

## 2019-07-08 RX ADMIN — ONDANSETRON 4 MG: 2 INJECTION INTRAMUSCULAR; INTRAVENOUS at 13:51

## 2019-07-08 RX ADMIN — POTASSIUM CHLORIDE 20 MEQ: 750 CAPSULE, EXTENDED RELEASE ORAL at 17:27

## 2019-07-08 RX ADMIN — INSULIN LISPRO 6 UNITS: 100 INJECTION, SOLUTION INTRAVENOUS; SUBCUTANEOUS at 17:27

## 2019-07-08 RX ADMIN — POLYETHYLENE GLYCOL 3350 17 G: 17 POWDER, FOR SOLUTION ORAL at 09:00

## 2019-07-08 RX ADMIN — SODIUM CHLORIDE 2 G: 9 INJECTION, SOLUTION INTRAVENOUS at 01:57

## 2019-07-08 RX ADMIN — INSULIN DETEMIR 40 UNITS: 100 INJECTION, SOLUTION SUBCUTANEOUS at 20:19

## 2019-07-08 RX ADMIN — INSULIN LISPRO 7 UNITS: 100 INJECTION, SOLUTION INTRAVENOUS; SUBCUTANEOUS at 11:27

## 2019-07-08 RX ADMIN — METFORMIN HYDROCHLORIDE 1000 MG: 500 TABLET ORAL at 09:01

## 2019-07-08 RX ADMIN — METFORMIN HYDROCHLORIDE 1000 MG: 500 TABLET ORAL at 17:27

## 2019-07-08 RX ADMIN — IPRATROPIUM BROMIDE AND ALBUTEROL SULFATE 3 ML: 2.5; .5 SOLUTION RESPIRATORY (INHALATION) at 06:53

## 2019-07-08 RX ADMIN — AMIODARONE HYDROCHLORIDE 150 MG: 1.5 INJECTION, SOLUTION INTRAVENOUS at 22:04

## 2019-07-08 RX ADMIN — ASPIRIN 81 MG: 81 TABLET ORAL at 09:01

## 2019-07-08 RX ADMIN — IPRATROPIUM BROMIDE AND ALBUTEROL SULFATE 3 ML: 2.5; .5 SOLUTION RESPIRATORY (INHALATION) at 18:57

## 2019-07-08 RX ADMIN — CHLORHEXIDINE GLUCONATE 15 ML: 1.2 RINSE ORAL at 06:20

## 2019-07-08 RX ADMIN — IPRATROPIUM BROMIDE AND ALBUTEROL SULFATE 3 ML: 2.5; .5 SOLUTION RESPIRATORY (INHALATION) at 14:37

## 2019-07-08 RX ADMIN — LAMOTRIGINE 200 MG: 100 TABLET ORAL at 20:14

## 2019-07-08 RX ADMIN — OXYCODONE HYDROCHLORIDE AND ACETAMINOPHEN 1 TABLET: 10; 325 TABLET ORAL at 06:24

## 2019-07-08 RX ADMIN — AMIODARONE HYDROCHLORIDE 1 MG/MIN: 1.8 INJECTION, SOLUTION INTRAVENOUS at 22:25

## 2019-07-09 ENCOUNTER — APPOINTMENT (OUTPATIENT)
Dept: GENERAL RADIOLOGY | Facility: HOSPITAL | Age: 59
End: 2019-07-09

## 2019-07-09 LAB
ABO + RH BLD: NORMAL
ABO + RH BLD: NORMAL
ANION GAP SERPL CALCULATED.3IONS-SCNC: 8 MMOL/L (ref 4–13)
BH BB BLOOD EXPIRATION DATE: NORMAL
BH BB BLOOD EXPIRATION DATE: NORMAL
BH BB BLOOD TYPE BARCODE: 5100
BH BB BLOOD TYPE BARCODE: 5100
BH BB DISPENSE STATUS: NORMAL
BH BB DISPENSE STATUS: NORMAL
BH BB PRODUCT CODE: NORMAL
BH BB PRODUCT CODE: NORMAL
BH BB UNIT NUMBER: NORMAL
BH BB UNIT NUMBER: NORMAL
BUN BLD-MCNC: 17 MG/DL (ref 5–21)
BUN/CREAT SERPL: 19.1 (ref 7–25)
CALCIUM SPEC-SCNC: 9.1 MG/DL (ref 8.4–10.4)
CHLORIDE SERPL-SCNC: 95 MMOL/L (ref 98–110)
CO2 SERPL-SCNC: 30 MMOL/L (ref 24–31)
CREAT BLD-MCNC: 0.89 MG/DL (ref 0.5–1.4)
CROSSMATCH INTERPRETATION: NORMAL
CROSSMATCH INTERPRETATION: NORMAL
DEPRECATED RDW RBC AUTO: 41.5 FL (ref 40–54)
ERYTHROCYTE [DISTWIDTH] IN BLOOD BY AUTOMATED COUNT: 12.8 % (ref 12–15)
GFR SERPL CREATININE-BSD FRML MDRD: 87 ML/MIN/1.73
GLUCOSE BLD-MCNC: 231 MG/DL (ref 70–100)
GLUCOSE BLDC GLUCOMTR-MCNC: 185 MG/DL (ref 70–130)
GLUCOSE BLDC GLUCOMTR-MCNC: 185 MG/DL (ref 70–130)
GLUCOSE BLDC GLUCOMTR-MCNC: 218 MG/DL (ref 70–130)
GLUCOSE BLDC GLUCOMTR-MCNC: 247 MG/DL (ref 70–130)
HCT VFR BLD AUTO: 29.7 % (ref 40–52)
HGB BLD-MCNC: 9.9 G/DL (ref 14–18)
MCH RBC QN AUTO: 29.6 PG (ref 28–32)
MCHC RBC AUTO-ENTMCNC: 33.3 G/DL (ref 33–36)
MCV RBC AUTO: 88.9 FL (ref 82–95)
PLATELET # BLD AUTO: 100 10*3/MM3 (ref 130–400)
PMV BLD AUTO: 11.5 FL (ref 6–12)
POTASSIUM BLD-SCNC: 4.4 MMOL/L (ref 3.5–5.3)
RBC # BLD AUTO: 3.34 10*6/MM3 (ref 4.8–5.9)
SODIUM BLD-SCNC: 133 MMOL/L (ref 135–145)
UNIT  ABO: NORMAL
UNIT  ABO: NORMAL
UNIT  RH: NORMAL
UNIT  RH: NORMAL
WBC NRBC COR # BLD: 8.52 10*3/MM3 (ref 4.8–10.8)

## 2019-07-09 PROCEDURE — 63710000001 INSULIN LISPRO (HUMAN) PER 5 UNITS: Performed by: THORACIC SURGERY (CARDIOTHORACIC VASCULAR SURGERY)

## 2019-07-09 PROCEDURE — 63710000001 INSULIN DETEMIR PER 5 UNITS: Performed by: THORACIC SURGERY (CARDIOTHORACIC VASCULAR SURGERY)

## 2019-07-09 PROCEDURE — 71046 X-RAY EXAM CHEST 2 VIEWS: CPT

## 2019-07-09 PROCEDURE — 82962 GLUCOSE BLOOD TEST: CPT

## 2019-07-09 PROCEDURE — 85027 COMPLETE CBC AUTOMATED: CPT | Performed by: THORACIC SURGERY (CARDIOTHORACIC VASCULAR SURGERY)

## 2019-07-09 PROCEDURE — 97116 GAIT TRAINING THERAPY: CPT

## 2019-07-09 PROCEDURE — 94799 UNLISTED PULMONARY SVC/PX: CPT

## 2019-07-09 PROCEDURE — 80048 BASIC METABOLIC PNL TOTAL CA: CPT | Performed by: THORACIC SURGERY (CARDIOTHORACIC VASCULAR SURGERY)

## 2019-07-09 PROCEDURE — 25010000002 AMIODARONE IN DEXTROSE 5% 360-4.14 MG/200ML-% SOLUTION: Performed by: THORACIC SURGERY (CARDIOTHORACIC VASCULAR SURGERY)

## 2019-07-09 PROCEDURE — 63710000001 INSULIN DETEMIR PER 5 UNITS: Performed by: NURSE PRACTITIONER

## 2019-07-09 PROCEDURE — 94760 N-INVAS EAR/PLS OXIMETRY 1: CPT

## 2019-07-09 PROCEDURE — 25010000002 FUROSEMIDE PER 20 MG: Performed by: THORACIC SURGERY (CARDIOTHORACIC VASCULAR SURGERY)

## 2019-07-09 RX ORDER — ROSUVASTATIN CALCIUM 20 MG/1
20 TABLET, COATED ORAL NIGHTLY
Status: DISCONTINUED | OUTPATIENT
Start: 2019-07-09 | End: 2019-07-10 | Stop reason: HOSPADM

## 2019-07-09 RX ORDER — LISINOPRIL 2.5 MG/1
2.5 TABLET ORAL
Status: DISCONTINUED | OUTPATIENT
Start: 2019-07-09 | End: 2019-07-10 | Stop reason: HOSPADM

## 2019-07-09 RX ORDER — AMIODARONE HYDROCHLORIDE 200 MG/1
400 TABLET ORAL 3 TIMES DAILY
Status: DISCONTINUED | OUTPATIENT
Start: 2019-07-09 | End: 2019-07-10 | Stop reason: HOSPADM

## 2019-07-09 RX ADMIN — ROSUVASTATIN CALCIUM 20 MG: 20 TABLET, FILM COATED ORAL at 20:36

## 2019-07-09 RX ADMIN — PANTOPRAZOLE SODIUM 40 MG: 40 TABLET, DELAYED RELEASE ORAL at 06:31

## 2019-07-09 RX ADMIN — AMIODARONE HYDROCHLORIDE 400 MG: 200 TABLET ORAL at 08:29

## 2019-07-09 RX ADMIN — METOPROLOL TARTRATE 25 MG: 25 TABLET, FILM COATED ORAL at 08:26

## 2019-07-09 RX ADMIN — AMIODARONE HYDROCHLORIDE 1 MG/MIN: 1.8 INJECTION, SOLUTION INTRAVENOUS at 03:56

## 2019-07-09 RX ADMIN — INSULIN LISPRO 4 UNITS: 100 INJECTION, SOLUTION INTRAVENOUS; SUBCUTANEOUS at 08:29

## 2019-07-09 RX ADMIN — ASPIRIN 81 MG: 81 TABLET ORAL at 08:28

## 2019-07-09 RX ADMIN — LISINOPRIL 2.5 MG: 2.5 TABLET ORAL at 19:09

## 2019-07-09 RX ADMIN — OXYCODONE HYDROCHLORIDE AND ACETAMINOPHEN 1 TABLET: 5; 325 TABLET ORAL at 20:35

## 2019-07-09 RX ADMIN — OXYCODONE HYDROCHLORIDE AND ACETAMINOPHEN 1 TABLET: 10; 325 TABLET ORAL at 03:22

## 2019-07-09 RX ADMIN — BISACODYL 10 MG: 5 TABLET ORAL at 08:28

## 2019-07-09 RX ADMIN — PREGABALIN 25 MG: 25 CAPSULE ORAL at 20:35

## 2019-07-09 RX ADMIN — METFORMIN HYDROCHLORIDE 1000 MG: 500 TABLET ORAL at 17:48

## 2019-07-09 RX ADMIN — POTASSIUM CHLORIDE 20 MEQ: 750 CAPSULE, EXTENDED RELEASE ORAL at 08:29

## 2019-07-09 RX ADMIN — POTASSIUM CHLORIDE 20 MEQ: 750 CAPSULE, EXTENDED RELEASE ORAL at 17:48

## 2019-07-09 RX ADMIN — AMIODARONE HYDROCHLORIDE 400 MG: 200 TABLET ORAL at 20:35

## 2019-07-09 RX ADMIN — METOPROLOL TARTRATE 37.5 MG: 25 TABLET, FILM COATED ORAL at 20:36

## 2019-07-09 RX ADMIN — INSULIN LISPRO 4 UNITS: 100 INJECTION, SOLUTION INTRAVENOUS; SUBCUTANEOUS at 12:32

## 2019-07-09 RX ADMIN — LAMOTRIGINE 200 MG: 100 TABLET ORAL at 20:36

## 2019-07-09 RX ADMIN — LEVETIRACETAM 2000 MG: 500 TABLET, FILM COATED ORAL at 20:35

## 2019-07-09 RX ADMIN — IPRATROPIUM BROMIDE AND ALBUTEROL SULFATE 3 ML: 2.5; .5 SOLUTION RESPIRATORY (INHALATION) at 11:16

## 2019-07-09 RX ADMIN — INSULIN DETEMIR 50 UNITS: 100 INJECTION, SOLUTION SUBCUTANEOUS at 20:36

## 2019-07-09 RX ADMIN — IPRATROPIUM BROMIDE AND ALBUTEROL SULFATE 3 ML: 2.5; .5 SOLUTION RESPIRATORY (INHALATION) at 15:23

## 2019-07-09 RX ADMIN — BISACODYL 10 MG: 5 TABLET ORAL at 20:36

## 2019-07-09 RX ADMIN — LEVETIRACETAM 1500 MG: 500 TABLET, FILM COATED ORAL at 08:26

## 2019-07-09 RX ADMIN — LAMOTRIGINE 200 MG: 100 TABLET ORAL at 08:28

## 2019-07-09 RX ADMIN — INSULIN LISPRO 2 UNITS: 100 INJECTION, SOLUTION INTRAVENOUS; SUBCUTANEOUS at 17:47

## 2019-07-09 RX ADMIN — AMIODARONE HYDROCHLORIDE 400 MG: 200 TABLET ORAL at 17:22

## 2019-07-09 RX ADMIN — INSULIN LISPRO 2 UNITS: 100 INJECTION, SOLUTION INTRAVENOUS; SUBCUTANEOUS at 20:49

## 2019-07-09 RX ADMIN — INSULIN DETEMIR 20 UNITS: 100 INJECTION, SOLUTION SUBCUTANEOUS at 08:29

## 2019-07-09 RX ADMIN — AMIODARONE HYDROCHLORIDE 0.5 MG/MIN: 1.8 INJECTION, SOLUTION INTRAVENOUS at 15:31

## 2019-07-09 RX ADMIN — LIDOCAINE 2 PATCH: 50 PATCH TOPICAL at 08:34

## 2019-07-09 RX ADMIN — PREGABALIN 25 MG: 25 CAPSULE ORAL at 08:29

## 2019-07-09 RX ADMIN — FUROSEMIDE 20 MG: 10 INJECTION, SOLUTION INTRAMUSCULAR; INTRAVENOUS at 08:33

## 2019-07-09 RX ADMIN — IPRATROPIUM BROMIDE AND ALBUTEROL SULFATE 3 ML: 2.5; .5 SOLUTION RESPIRATORY (INHALATION) at 07:01

## 2019-07-09 RX ADMIN — FUROSEMIDE 20 MG: 10 INJECTION, SOLUTION INTRAMUSCULAR; INTRAVENOUS at 17:48

## 2019-07-09 RX ADMIN — METFORMIN HYDROCHLORIDE 1000 MG: 500 TABLET ORAL at 08:29

## 2019-07-09 RX ADMIN — OXYCODONE HYDROCHLORIDE AND ACETAMINOPHEN 1 TABLET: 10; 325 TABLET ORAL at 15:09

## 2019-07-10 ENCOUNTER — NURSE TRIAGE (OUTPATIENT)
Dept: CALL CENTER | Facility: HOSPITAL | Age: 59
End: 2019-07-10

## 2019-07-10 VITALS
TEMPERATURE: 97.7 F | BODY MASS INDEX: 33.32 KG/M2 | WEIGHT: 246 LBS | RESPIRATION RATE: 16 BRPM | SYSTOLIC BLOOD PRESSURE: 110 MMHG | OXYGEN SATURATION: 98 % | HEART RATE: 95 BPM | DIASTOLIC BLOOD PRESSURE: 71 MMHG | HEIGHT: 72 IN

## 2019-07-10 LAB
ANION GAP SERPL CALCULATED.3IONS-SCNC: 8 MMOL/L (ref 4–13)
BUN BLD-MCNC: 20 MG/DL (ref 5–21)
BUN/CREAT SERPL: 21.7 (ref 7–25)
CALCIUM SPEC-SCNC: 9.3 MG/DL (ref 8.4–10.4)
CHLORIDE SERPL-SCNC: 96 MMOL/L (ref 98–110)
CO2 SERPL-SCNC: 32 MMOL/L (ref 24–31)
CREAT BLD-MCNC: 0.92 MG/DL (ref 0.5–1.4)
DEPRECATED RDW RBC AUTO: 42 FL (ref 40–54)
ERYTHROCYTE [DISTWIDTH] IN BLOOD BY AUTOMATED COUNT: 12.8 % (ref 12–15)
GFR SERPL CREATININE-BSD FRML MDRD: 84 ML/MIN/1.73
GLUCOSE BLD-MCNC: 118 MG/DL (ref 70–100)
GLUCOSE BLDC GLUCOMTR-MCNC: 100 MG/DL (ref 70–130)
GLUCOSE BLDC GLUCOMTR-MCNC: 125 MG/DL (ref 70–130)
GLUCOSE BLDC GLUCOMTR-MCNC: 128 MG/DL (ref 70–130)
HCT VFR BLD AUTO: 30.9 % (ref 40–52)
HGB BLD-MCNC: 10.2 G/DL (ref 14–18)
MCH RBC QN AUTO: 29.8 PG (ref 28–32)
MCHC RBC AUTO-ENTMCNC: 33 G/DL (ref 33–36)
MCV RBC AUTO: 90.4 FL (ref 82–95)
PLATELET # BLD AUTO: 122 10*3/MM3 (ref 130–400)
PMV BLD AUTO: 11.3 FL (ref 6–12)
POTASSIUM BLD-SCNC: 4.3 MMOL/L (ref 3.5–5.3)
RBC # BLD AUTO: 3.42 10*6/MM3 (ref 4.8–5.9)
SODIUM BLD-SCNC: 136 MMOL/L (ref 135–145)
WBC NRBC COR # BLD: 7.61 10*3/MM3 (ref 4.8–10.8)

## 2019-07-10 PROCEDURE — 25010000002 FUROSEMIDE PER 20 MG: Performed by: THORACIC SURGERY (CARDIOTHORACIC VASCULAR SURGERY)

## 2019-07-10 PROCEDURE — 85027 COMPLETE CBC AUTOMATED: CPT | Performed by: NURSE PRACTITIONER

## 2019-07-10 PROCEDURE — 99024 POSTOP FOLLOW-UP VISIT: CPT | Performed by: NURSE PRACTITIONER

## 2019-07-10 PROCEDURE — 63710000001 INSULIN DETEMIR PER 5 UNITS: Performed by: THORACIC SURGERY (CARDIOTHORACIC VASCULAR SURGERY)

## 2019-07-10 PROCEDURE — 97116 GAIT TRAINING THERAPY: CPT

## 2019-07-10 PROCEDURE — 25010000002 BIVALIRUDIN TRIFLUOROACETATE 250 MG RECONSTITUTED SOLUTION: Performed by: INTERNAL MEDICINE

## 2019-07-10 PROCEDURE — 82962 GLUCOSE BLOOD TEST: CPT

## 2019-07-10 PROCEDURE — 80048 BASIC METABOLIC PNL TOTAL CA: CPT | Performed by: NURSE PRACTITIONER

## 2019-07-10 RX ORDER — LISINOPRIL 2.5 MG/1
20 TABLET ORAL NIGHTLY
Qty: 30 TABLET | Refills: 2 | OUTPATIENT
Start: 2019-07-10 | End: 2019-07-12 | Stop reason: HOSPADM

## 2019-07-10 RX ORDER — OXYCODONE HYDROCHLORIDE AND ACETAMINOPHEN 5; 325 MG/1; MG/1
1 TABLET ORAL EVERY 6 HOURS PRN
Qty: 30 TABLET | Refills: 0 | Status: ON HOLD | OUTPATIENT
Start: 2019-07-10 | End: 2019-09-05

## 2019-07-10 RX ORDER — AMIODARONE HYDROCHLORIDE 400 MG/1
400 TABLET ORAL DAILY
Qty: 30 TABLET | Refills: 0 | Status: SHIPPED | OUTPATIENT
Start: 2019-07-10 | End: 2019-07-17 | Stop reason: SDUPTHER

## 2019-07-10 RX ADMIN — LEVETIRACETAM 1500 MG: 500 TABLET, FILM COATED ORAL at 08:30

## 2019-07-10 RX ADMIN — POTASSIUM CHLORIDE 20 MEQ: 750 CAPSULE, EXTENDED RELEASE ORAL at 08:31

## 2019-07-10 RX ADMIN — METFORMIN HYDROCHLORIDE 1000 MG: 500 TABLET ORAL at 08:31

## 2019-07-10 RX ADMIN — PREGABALIN 25 MG: 25 CAPSULE ORAL at 08:37

## 2019-07-10 RX ADMIN — PANTOPRAZOLE SODIUM 40 MG: 40 TABLET, DELAYED RELEASE ORAL at 07:45

## 2019-07-10 RX ADMIN — METOPROLOL TARTRATE 37.5 MG: 25 TABLET, FILM COATED ORAL at 08:29

## 2019-07-10 RX ADMIN — INSULIN DETEMIR 30 UNITS: 100 INJECTION, SOLUTION SUBCUTANEOUS at 08:37

## 2019-07-10 RX ADMIN — POLYETHYLENE GLYCOL 3350 17 G: 17 POWDER, FOR SOLUTION ORAL at 08:30

## 2019-07-10 RX ADMIN — AMIODARONE HYDROCHLORIDE 400 MG: 200 TABLET ORAL at 16:26

## 2019-07-10 RX ADMIN — ASPIRIN 81 MG: 81 TABLET ORAL at 08:30

## 2019-07-10 RX ADMIN — LAMOTRIGINE 200 MG: 100 TABLET ORAL at 08:29

## 2019-07-10 RX ADMIN — AMIODARONE HYDROCHLORIDE 400 MG: 200 TABLET ORAL at 08:29

## 2019-07-10 RX ADMIN — LISINOPRIL 2.5 MG: 2.5 TABLET ORAL at 08:31

## 2019-07-10 RX ADMIN — FUROSEMIDE 20 MG: 10 INJECTION, SOLUTION INTRAMUSCULAR; INTRAVENOUS at 08:30

## 2019-07-10 RX ADMIN — POTASSIUM CHLORIDE 20 MEQ: 750 CAPSULE, EXTENDED RELEASE ORAL at 17:59

## 2019-07-11 ENCOUNTER — TELEPHONE (OUTPATIENT)
Dept: CARDIAC SURGERY | Facility: CLINIC | Age: 59
End: 2019-07-11

## 2019-07-11 ENCOUNTER — READMISSION MANAGEMENT (OUTPATIENT)
Dept: CALL CENTER | Facility: HOSPITAL | Age: 59
End: 2019-07-11

## 2019-07-11 PROBLEM — R01.1 CARDIAC MURMUR: Status: ACTIVE | Noted: 2019-07-11

## 2019-07-11 PROBLEM — R91.8 LUNG NODULES: Status: ACTIVE | Noted: 2019-07-11

## 2019-07-11 PROBLEM — I71.20 THORACIC AORTIC ANEURYSM WITHOUT RUPTURE: Status: ACTIVE | Noted: 2019-07-11

## 2019-07-11 PROBLEM — E87.1 HYPONATREMIA: Status: ACTIVE | Noted: 2019-07-11

## 2019-07-11 PROBLEM — I48.0 PAROXYSMAL ATRIAL FIBRILLATION: Status: ACTIVE | Noted: 2019-07-11

## 2019-07-11 NOTE — TELEPHONE ENCOUNTER
Patients wife calling, patient was discharged last night and pharmacy stating that amiodarone and lasix rx's were not sent. Patient uses [facility] at [facility].

## 2019-07-11 NOTE — TELEPHONE ENCOUNTER
"Mr. Boyer had received a dose of Amiodarone before discharge today. Caller is advised to call Dr. Tang's office in am for prescription to be called in to [facility]. Caller agrees to follow care advice.     Reason for Disposition  • Caller requesting a NON-URGENT new prescription or refill and triager unable to refill per unit policy    Additional Information  • Negative: Drug overdose and nurse unable to answer question  • Negative: Caller requesting information not related to medicine  • Negative: Caller requesting a prescription for Strep throat and has a positive culture result  • Negative: Rash while taking a medication or within 3 days of stopping it  • Negative: Immunization reaction suspected  • Negative: [1] Asthma and [2] having symptoms of asthma (cough, wheezing, etc)  • Negative: MORE THAN A DOUBLE DOSE of a prescription or over-the-counter (OTC) drug  • Negative: [1] DOUBLE DOSE (an extra dose or lesser amount) of over-the-counter (OTC) drug AND [2] any symptoms (e.g., dizziness, nausea, pain, sleepiness)  • Negative: [1] DOUBLE DOSE (an extra dose or lesser amount) of prescription drug AND [2] any symptoms (e.g., dizziness, nausea, pain, sleepiness)  • Negative: Took another person's prescription drug  • Negative: [1] DOUBLE DOSE (an extra dose or lesser amount) of prescription drug AND [2] NO symptoms (Exception: a double dose of antibiotics)  • Negative: Diabetes drug error or overdose (e.g., insulin or extra dose)  • Negative: [1] Request for URGENT new prescription or refill of \"essential\" medication (i.e., likelihood of harm to patient if not taken) AND [2] triager unable to fill per unit policy  • Negative: [1] Prescription not at pharmacy AND [2] was prescribed today by PCP  • Negative: Pharmacy calling with prescription questions and triager unable to answer question  • Negative: Caller has URGENT medication question about med that PCP prescribed and triager unable to answer " "question  • Negative: Caller has NON-URGENT medication question about med that PCP prescribed and triager unable to answer question    Answer Assessment - Initial Assessment Questions  1. SYMPTOMS: \"Do you have any symptoms?\"      na  2. SEVERITY: If symptoms are present, ask \"Are they mild, moderate or severe?\"      Prescription for Amiodarone not at the drug store after discharge today.    Protocols used: MEDICATION QUESTION CALL-ADULT-      "

## 2019-07-11 NOTE — OUTREACH NOTE
Prep Survey      Responses   Facility patient discharged from?  [place]   Is patient eligible?  Yes   Discharge diagnosis  CAD s/p multiple cardiac stents to LAD, PTCA of mid LAD, hx ischemic stroke with right side weakness, seizures, DM II, obesity< HTN, HLD, hx thrombocytopenia, S/P CABG x2 with LIMA, Left leg OVH,   Does the patient have one of the following disease processes/diagnoses(primary or secondary)?  Cardiothoracic surgery   Does the patient have Home health ordered?  No   Is there a DME ordered?  No   Comments regarding appointments  See AVS   Prep survey completed?  Yes          Sulema Mills RN

## 2019-07-11 NOTE — TELEPHONE ENCOUNTER
Amiodarone called into pharmacy and lasix not needed at this time, per Skye Olson, APRN. Patients wife notified.

## 2019-07-12 ENCOUNTER — NURSE TRIAGE (OUTPATIENT)
Dept: CALL CENTER | Facility: HOSPITAL | Age: 59
End: 2019-07-12

## 2019-07-12 ENCOUNTER — READMISSION MANAGEMENT (OUTPATIENT)
Dept: CALL CENTER | Facility: HOSPITAL | Age: 59
End: 2019-07-12

## 2019-07-12 ENCOUNTER — APPOINTMENT (OUTPATIENT)
Dept: CT IMAGING | Facility: HOSPITAL | Age: 59
End: 2019-07-12

## 2019-07-12 ENCOUNTER — OFFICE VISIT (OUTPATIENT)
Dept: CARDIAC SURGERY | Facility: CLINIC | Age: 59
End: 2019-07-12

## 2019-07-12 ENCOUNTER — APPOINTMENT (OUTPATIENT)
Dept: GENERAL RADIOLOGY | Facility: HOSPITAL | Age: 59
End: 2019-07-12

## 2019-07-12 ENCOUNTER — HOSPITAL ENCOUNTER (EMERGENCY)
Facility: HOSPITAL | Age: 59
Discharge: HOME | End: 2019-07-12
Attending: EMERGENCY MEDICINE | Admitting: EMERGENCY MEDICINE

## 2019-07-12 VITALS
WEIGHT: 247 LBS | OXYGEN SATURATION: 98 % | HEART RATE: 81 BPM | SYSTOLIC BLOOD PRESSURE: 124 MMHG | DIASTOLIC BLOOD PRESSURE: 74 MMHG | HEIGHT: 72 IN | BODY MASS INDEX: 33.46 KG/M2

## 2019-07-12 VITALS
SYSTOLIC BLOOD PRESSURE: 129 MMHG | HEIGHT: 72 IN | HEART RATE: 68 BPM | WEIGHT: 242 LBS | TEMPERATURE: 97.9 F | DIASTOLIC BLOOD PRESSURE: 76 MMHG | OXYGEN SATURATION: 100 % | RESPIRATION RATE: 16 BRPM | BODY MASS INDEX: 32.78 KG/M2

## 2019-07-12 DIAGNOSIS — R91.8 LUNG NODULES: ICD-10-CM

## 2019-07-12 DIAGNOSIS — I71.20 THORACIC AORTIC ANEURYSM WITHOUT RUPTURE (HCC): ICD-10-CM

## 2019-07-12 DIAGNOSIS — R42 LIGHTHEADEDNESS: Primary | ICD-10-CM

## 2019-07-12 DIAGNOSIS — I48.0 PAROXYSMAL ATRIAL FIBRILLATION (HCC): ICD-10-CM

## 2019-07-12 DIAGNOSIS — I95.9 HYPOTENSION, UNSPECIFIED HYPOTENSION TYPE: ICD-10-CM

## 2019-07-12 DIAGNOSIS — I25.119 CORONARY ARTERY DISEASE INVOLVING NATIVE CORONARY ARTERY OF NATIVE HEART WITH ANGINA PECTORIS (HCC): Primary | ICD-10-CM

## 2019-07-12 DIAGNOSIS — R01.1 CARDIAC MURMUR: ICD-10-CM

## 2019-07-12 DIAGNOSIS — I65.22 STENOSIS OF LEFT CAROTID ARTERY: ICD-10-CM

## 2019-07-12 DIAGNOSIS — D69.1 ASPIRIN-LIKE PLATELET FUNCTION DEFECT (HCC): ICD-10-CM

## 2019-07-12 LAB
ANION GAP SERPL CALCULATED.3IONS-SCNC: 7 MMOL/L (ref 4–13)
APTT PPP: 28.4 SECONDS (ref 24.1–35)
BASOPHILS # BLD AUTO: 0.03 10*3/MM3 (ref 0–0.2)
BASOPHILS NFR BLD AUTO: 0.3 % (ref 0–2)
BILIRUB UR QL STRIP: NEGATIVE
BUN BLD-MCNC: 19 MG/DL (ref 5–21)
BUN/CREAT SERPL: 17 (ref 7–25)
CALCIUM SPEC-SCNC: 9.4 MG/DL (ref 8.4–10.4)
CHLORIDE SERPL-SCNC: 106 MMOL/L (ref 98–110)
CK MB SERPL-CCNC: 1.56 NG/ML (ref 0–5)
CLARITY UR: CLEAR
CO2 SERPL-SCNC: 26 MMOL/L (ref 24–31)
COLOR UR: YELLOW
CREAT BLD-MCNC: 1.12 MG/DL (ref 0.5–1.4)
DEPRECATED RDW RBC AUTO: 40.4 FL (ref 40–54)
EOSINOPHIL # BLD AUTO: 0.26 10*3/MM3 (ref 0–0.7)
EOSINOPHIL NFR BLD AUTO: 3 % (ref 0–4)
ERYTHROCYTE [DISTWIDTH] IN BLOOD BY AUTOMATED COUNT: 12.8 % (ref 12–15)
GFR SERPL CREATININE-BSD FRML MDRD: 67 ML/MIN/1.73
GLUCOSE BLD-MCNC: 61 MG/DL (ref 70–100)
GLUCOSE BLDC GLUCOMTR-MCNC: 66 MG/DL (ref 70–130)
GLUCOSE UR STRIP-MCNC: NEGATIVE MG/DL
HCT VFR BLD AUTO: 27.9 % (ref 40–52)
HGB BLD-MCNC: 9.5 G/DL (ref 14–18)
HGB UR QL STRIP.AUTO: NEGATIVE
HOLD SPECIMEN: NORMAL
HOLD SPECIMEN: NORMAL
IMM GRANULOCYTES # BLD AUTO: 0.1 10*3/MM3 (ref 0–0.05)
IMM GRANULOCYTES NFR BLD AUTO: 1.2 % (ref 0–5)
INR PPP: 1.07 (ref 0.91–1.09)
KETONES UR QL STRIP: NEGATIVE
LEUKOCYTE ESTERASE UR QL STRIP.AUTO: NEGATIVE
LYMPHOCYTES # BLD AUTO: 1.75 10*3/MM3 (ref 0.72–4.86)
LYMPHOCYTES NFR BLD AUTO: 20.4 % (ref 15–45)
MAGNESIUM SERPL-MCNC: 2 MG/DL (ref 1.4–2.2)
MCH RBC QN AUTO: 30.2 PG (ref 28–32)
MCHC RBC AUTO-ENTMCNC: 34.1 G/DL (ref 33–36)
MCV RBC AUTO: 88.6 FL (ref 82–95)
MONOCYTES # BLD AUTO: 0.85 10*3/MM3 (ref 0.19–1.3)
MONOCYTES NFR BLD AUTO: 9.9 % (ref 4–12)
NEUTROPHILS # BLD AUTO: 5.59 10*3/MM3 (ref 1.87–8.4)
NEUTROPHILS NFR BLD AUTO: 65.2 % (ref 39–78)
NITRITE UR QL STRIP: NEGATIVE
NRBC BLD AUTO-RTO: 0 /100 WBC (ref 0–0.2)
NT-PROBNP SERPL-MCNC: 3130 PG/ML (ref 0–900)
PH UR STRIP.AUTO: 7 [PH] (ref 5–8)
PHOSPHATE SERPL-MCNC: 3.8 MG/DL (ref 2.5–4.5)
PLATELET # BLD AUTO: 187 10*3/MM3 (ref 130–400)
PMV BLD AUTO: 10.8 FL (ref 6–12)
POTASSIUM BLD-SCNC: 3.8 MMOL/L (ref 3.5–5.3)
PROT UR QL STRIP: NEGATIVE
PROTHROMBIN TIME: 14.2 SECONDS (ref 11.9–14.6)
RBC # BLD AUTO: 3.15 10*6/MM3 (ref 4.8–5.9)
SODIUM BLD-SCNC: 139 MMOL/L (ref 135–145)
SP GR UR STRIP: 1.02 (ref 1–1.03)
TROPONIN I SERPL-MCNC: 0.05 NG/ML (ref 0–0.03)
TROPONIN I SERPL-MCNC: 0.05 NG/ML (ref 0–0.03)
UROBILINOGEN UR QL STRIP: NORMAL
WBC NRBC COR # BLD: 8.58 10*3/MM3 (ref 4.8–10.8)
WHOLE BLOOD HOLD SPECIMEN: NORMAL
WHOLE BLOOD HOLD SPECIMEN: NORMAL

## 2019-07-12 PROCEDURE — 99024 POSTOP FOLLOW-UP VISIT: CPT | Performed by: THORACIC SURGERY (CARDIOTHORACIC VASCULAR SURGERY)

## 2019-07-12 PROCEDURE — 80048 BASIC METABOLIC PNL TOTAL CA: CPT | Performed by: EMERGENCY MEDICINE

## 2019-07-12 PROCEDURE — 85730 THROMBOPLASTIN TIME PARTIAL: CPT | Performed by: EMERGENCY MEDICINE

## 2019-07-12 PROCEDURE — 83735 ASSAY OF MAGNESIUM: CPT | Performed by: EMERGENCY MEDICINE

## 2019-07-12 PROCEDURE — 85025 COMPLETE CBC W/AUTO DIFF WBC: CPT | Performed by: EMERGENCY MEDICINE

## 2019-07-12 PROCEDURE — 85610 PROTHROMBIN TIME: CPT | Performed by: EMERGENCY MEDICINE

## 2019-07-12 PROCEDURE — 82553 CREATINE MB FRACTION: CPT | Performed by: EMERGENCY MEDICINE

## 2019-07-12 PROCEDURE — 84484 ASSAY OF TROPONIN QUANT: CPT | Performed by: EMERGENCY MEDICINE

## 2019-07-12 PROCEDURE — 93005 ELECTROCARDIOGRAM TRACING: CPT | Performed by: EMERGENCY MEDICINE

## 2019-07-12 PROCEDURE — 82962 GLUCOSE BLOOD TEST: CPT

## 2019-07-12 PROCEDURE — 71045 X-RAY EXAM CHEST 1 VIEW: CPT

## 2019-07-12 PROCEDURE — 71275 CT ANGIOGRAPHY CHEST: CPT

## 2019-07-12 PROCEDURE — 93005 ELECTROCARDIOGRAM TRACING: CPT

## 2019-07-12 PROCEDURE — 81003 URINALYSIS AUTO W/O SCOPE: CPT | Performed by: EMERGENCY MEDICINE

## 2019-07-12 PROCEDURE — 84100 ASSAY OF PHOSPHORUS: CPT | Performed by: EMERGENCY MEDICINE

## 2019-07-12 PROCEDURE — 83880 ASSAY OF NATRIURETIC PEPTIDE: CPT | Performed by: EMERGENCY MEDICINE

## 2019-07-12 PROCEDURE — 0 IOPAMIDOL PER 1 ML: Performed by: EMERGENCY MEDICINE

## 2019-07-12 PROCEDURE — 93010 ELECTROCARDIOGRAM REPORT: CPT | Performed by: INTERNAL MEDICINE

## 2019-07-12 PROCEDURE — 99284 EMERGENCY DEPT VISIT MOD MDM: CPT

## 2019-07-12 RX ADMIN — IOPAMIDOL 100 ML: 755 INJECTION, SOLUTION INTRAVENOUS at 03:15

## 2019-07-12 RX ADMIN — SODIUM CHLORIDE 250 ML: 9 INJECTION, SOLUTION INTRAVENOUS at 04:12

## 2019-07-12 NOTE — ED PROVIDER NOTES
Subjective     60 y/o male recent CABG by Dr. Tang arrives with wife for evaluation of lightheadedness. The patient states he got lightheaded just PTA and tried to sit in front of a fan in hopes this would make him feel better without improvement. He denied all symptoms including CP, SOB, nausea, vomiting, diarrhea, numbness, tingling, loss of sensation, fevers, chills, falls, trauma, dizziness, headaches, palpitations, cough, hemptysis, numbness, tingling, loss of sensation or other issues. His wife states during this time she noted his BP to be 70s x2 and thus presents here. Upon arrival here the patient denies all symptoms stating he feels much improved. He states he was feeling well prior to this AM and has no issues after the surgery. Of note he did have a history of afib at [place] and states he began taking medication for this yesterday.           Family, social and past history reviewed as below, prior documentation of H and Ps and other documentation are reviewed:    Past Medical History:  No date: Arthritis  No date: Asthma  No date: Carotid disease, bilateral (CMS/[facility])  No date: Chest pain  No date: Chronic ischemic heart disease  No date: Chronic sinusitis  No date: Maribell bullosa  No date: Coronary artery disease  No date: Deviated septum  No date: Diabetes mellitus (CMS/[facility])  No date: Difficulty urinating  No date: Diverticulitis  No date: Enlarged prostate  No date: Fatty liver  No date: GERD (gastroesophageal reflux disease)  No date: Hyperlipidemia  No date: Hypertension  No date: Hypertrophy of nasal turbinates  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 Broadbent; widely patent previously placed stents in               the left anterior descending and obstructive disease                involving the diagonal branch which was treated medically  7/14/2017: CARDIAC CATHETERIZATION; N/A      Comment:  Procedure: Left Heart Cath;  Surgeon: Wade Ramey MD;                 Location:  [facility] CATH IN[facility];  Service:   10/15/2018: CARDIAC CATHETERIZATION; Left      Comment:  Procedure: Cardiac Catheterization/Vascular Study;                 Surgeon: Wade Ramey MD;  Location:  [facility] CATH                INVASIVE LOCATION;  Service: Cardiology  10/15/2018: CARDIAC CATHETERIZATION      Comment:  Procedure: Functional Flow [place];  Surgeon: Wade Ramey MD;  Location:  [facility];                 Service: Cardiology  10/15/2018: CARDIAC CATHETERIZATION; N/A      Comment:  Procedure: Left ventriculography;  Surgeon: Wade Ramey MD;  Location:  [facility];                 Service: Cardiology  6/26/2019: CARDIAC CATHETERIZATION; Left      Comment:  Procedure: Cardiac Catheterization/Vascular Study VEL OK               HE WILL WAIT 1 YEAR FOR SHOULDER SURGERY ;  Surgeon:                Wade Ramey MD;  Location:  [facility] IN[facility]AS[facility]                LOCATION;  Service: Cardiology  8/1/2018: CHOLECYSTECTOMY WITH INTRAOPERATIVE CHOLANGIOGRAM; N/A      Comment:  Procedure: CHOLECYSTECTOMY LAPAROSCOPIC INTRAOPERATIVE                CHOLANGIOGRAM;  Surgeon: Shane Ann MD;  Location:               [facility] OR;  Service: General  No date: CORONARY ANGIOPLASTY  7/6/2019: CORONARY ARTERY BYPASS GRAFT; N/A      Comment:  Procedure: CABG X2 WITH LIMA, LEFT LEG OVH, AND                PLACEMENT OF LEFT FEMORAL ARTERIAL LINE;  Surgeon: Steven Tang MD;  Location: [facility] OR;  Service:                 Cardiothoracic  No date: CORONARY STENT PLACEMENT      Comment:  x 6  2017: ENDOSCOPIC FUNCTIONAL SINUS SURGERY (FESS); Bilateral      Comment:  Procedure: PROCEDURE PERFORMED:  Bilateral functional                endoscopic anterior ethmoidectomy with bilateral middle                meatal antrostomy Septoplasty Right kathia bullosa                resection Bilateral inferior turbinate reduction via                Coblation;  Surgeon: Mayank Ibarra MD;  Location:                [facility] OR;  Service:   2018: ENDOSCOPY; N/A      Comment:  Procedure: ESOPHAGOGASTRODUODENOSCOPY WITH ANESTHESIA;                 Surgeon: Benitez Mas MD;  Location: [facility]                ENDOSCOPY;  Service: Gastroenterology  No date: HERNIA REPAIR      Comment:  x2 inguinal area  No date: KIDNEY STONE SURGERY  No date: KNEE SURGERY; Right  No date: OTHER SURGICAL HISTORY      Comment:  urolift  No date: PROSTATE SURGERY      Comment:  Dr. Badillo -   No date: THUMB AMPUTATION; Left      Comment:  partial  No date: TOE AMPUTATION; Right      Comment:  big    Social History    Socioeconomic History      Marital status:       Spouse name: Not on file      Number of children: Not on file      Years of education: Not on file      Highest education level: Not on file    Tobacco Use      Smoking status: Former Smoker        Years: 4.50        Types: Cigarettes        Quit date:         Years since quittin.5      Smokeless tobacco: Former User        Types: Chew        Quit date:     Substance and Sexual Activity      Alcohol use: No      Drug use: No      Sexual activity: Defer      Family history: reviewed and noncontributory             Review of Systems   All other systems reviewed and are negative.      Past Medical History:   Diagnosis Date   • Arthritis    • Asthma    • Carotid disease, bilateral (CMS/HCC)    • Chest pain    • Chronic ischemic heart disease    • Chronic sinusitis    • Kathia  bullosa    • Coronary artery disease    • Deviated septum    • Diabetes mellitus (CMS/HCC)    • Difficulty urinating    • Diverticulitis    • Enlarged prostate    • Fatty liver    • GERD (gastroesophageal reflux disease)    • Hyperlipidemia    • Hypertension    • Hypertrophy of nasal turbinates    • Keratoderma    • Kidney stone    • Migraine    • Murmur, heart    • Myocardial infarction (CMS/HCC)    • Obesity    • PONV (postoperative nausea and vomiting)    • Psoriasis    • Seizures (CMS/HCC)    • Sinus congestion    • Sleep apnea    • SOB (shortness of breath)    • Stroke (CMS/HCC)    • UTI (urinary tract infection)        Allergies   Allergen Reactions   • Flagyl [Metronidazole] Hives   • Atorvastatin Other (See Comments)     Muscle cramps   • Ciprofloxacin Hives       Past Surgical History:   Procedure Laterality Date   • CARDIAC CATHETERIZATION  01/2016    Dr. Broadbent; widely patent previously placed stents in the left anterior descending and obstructive disease involving the diagonal branch which was treated medically   • CARDIAC CATHETERIZATION N/A 7/14/2017    Procedure: Left Heart Cath;  Surgeon: Wade Ramey MD;  Location:  [facility];  Service:    • CARDIAC CATHETERIZATION Left 10/15/2018    Procedure: Cardiac Catheterization/Vascular Study;  Surgeon: Wade Ramey MD;  Location:  [facility];  Service: Cardiology   • CARDIAC CATHETERIZATION  10/15/2018    Procedure: Functional Flow [place];  Surgeon: Wade Ramey MD;  Location:  [facility] INV[facility] LOC[facility];  Service: Cardiology   • CARDIAC CATHETERIZATION N/A 10/15/2018    Procedure: Left ventriculography;  Surgeon: Wade Ramey MD;  Location:  [facility] IN[facility];  Service: Cardiology   • CARDIAC CATHETERIZATION Left 6/26/2019    Procedure: Cardiac Catheterization/Vascular Study VEL OK  HE WILL WAIT 1 YEAR FOR SHOULDER SURGERY ;  Surgeon: Wade Ramey MD;  Location:  [facility];   Service: Cardiology   • CHOLECYSTECTOMY WITH INTRAOPERATIVE CHOLANGIOGRAM N/A 2018    Procedure: CHOLECYSTECTOMY LAPAROSCOPIC INTRAOPERATIVE CHOLANGIOGRAM;  Surgeon: Shane Ann MD;  Location: [facility] OR;  Service: General   • CORONARY ANGIOPLASTY     • CORONARY ARTERY BYPASS GRAFT N/A 2019    Procedure: CABG X2 WITH LIMA, LEFT LEG OVH, AND PLACEMENT OF LEFT FEMORAL ARTERIAL LINE;  Surgeon: Steven Tang MD;  Location:  [facility] OR;  Service: Cardiothoracic   • CORONARY STENT PLACEMENT      x 6   • ENDOSCOPIC FUNCTIONAL SINUS SURGERY (FESS) Bilateral 2017    Procedure: PROCEDURE PERFORMED:  Bilateral functional endoscopic anterior ethmoidectomy with bilateral middle meatal antrostomy Septoplasty Right kathia bullosa resection Bilateral inferior turbinate reduction via Coblation;  Surgeon: Mayank Ibarra MD;  Location:  [facility] OR;  Service:    • ENDOSCOPY N/A 2018    Procedure: ESOPHAGOGASTRODUODENOSCOPY WITH ANESTHESIA;  Surgeon: Benitez Mas MD;  Location: [facility] ENDOSCOPY;  Service: Gastroenterology   • HERNIA REPAIR      x2 inguinal area   • KIDNEY STONE SURGERY     • KNEE SURGERY Right    • OTHER SURGICAL HISTORY      urolift   • PROSTATE SURGERY      Dr. Badillo -    • THUMB AMPUTATION Left     partial   • TOE AMPUTATION Right     big       Family History   Problem Relation Age of Onset   • Heart disease Father    • No Known Problems Mother        Social History     Socioeconomic History   • Marital status:      Spouse name: Not on file   • Number of children: Not on file   • Years of education: Not on file   • Highest education level: Not on file   Tobacco Use   • Smoking status: Former Smoker     Years: 4.50     Types: Cigarettes     Last attempt to quit:      Years since quittin.5   • Smokeless tobacco: Former User     Types: Chew     Quit date:    Substance and Sexual Activity   • Alcohol use: No   • Drug use: No   • Sexual activity: Defer            Objective   Physical Exam   Constitutional: He is oriented to person, place, and time. He appears well-developed and well-nourished.   HENT:   Head: Normocephalic.   Nose: Nose normal.   Eyes: Conjunctivae and EOM are normal. Pupils are equal, round, and reactive to light.   Neck: Normal range of motion. Neck supple.   Cardiovascular: Normal rate, regular rhythm, normal heart sounds and intact distal pulses. Exam reveals no gallop and no friction rub.   No murmur heard.  Pulmonary/Chest: Effort normal and breath sounds normal. No stridor. No respiratory distress. He has no wheezes. He has no rales. He exhibits no tenderness.   Abdominal: Soft. Bowel sounds are normal.   Musculoskeletal: Normal range of motion.   Neurological: He is alert and oriented to person, place, and time.   Skin: Skin is warm. Capillary refill takes less than 2 seconds.   Psychiatric: He has a normal mood and affect.   Vitals reviewed.      ECG 12 Lead    Date/Time: 7/12/2019 1:33 AM  Performed by: Ramón Avery MD  Authorized by: Ramón Avery MD   Interpreted by physician  Rhythm: atrial fibrillation  Rate: normal  BPM: 89  QRS axis: normal      ECG 12 Lead    Date/Time: 7/12/2019 3:59 AM  Performed by: Ramón Avery MD  Authorized by: Ramón Avery MD   Interpreted by physician  Rhythm: sinus rhythm  Rate: normal  BPM: 67  QRS axis: normal  Conduction: 1st degree                 ED Course    Dr. Tang asks to stop his lisinopril and have the patient follow up in the office. The patient's EKG and CE show no acute changes to indicate ischemia. At this time he has been symptom free since arrival. Given above will dc to home.       ED Course as of Jul 12 0507 Fri Jul 12, 2019   0410 CT read as no PE possible pulmonary HTN, small trace pleural effusions  [JH]      ED Course User Index  [JH] Ramón Avery MD      XR Chest 1 View   ED Interpretation   No aute changes      CT Angiogram Chest With  Contrast    (Results Pending)     Labs Reviewed   BASIC METABOLIC PANEL - Abnormal; Notable for the following components:       Result Value    Glucose 61 (*)     All other components within normal limits    Narrative:     GFR Normal >60  Chronic Kidney Disease <60  Kidney Failure <15   BNP (IN-HOUSE) - Abnormal; Notable for the following components:    proBNP 3,130.0 (*)     All other components within normal limits   TROPONIN (IN-HOUSE) - Abnormal; Notable for the following components:    Troponin I 0.046 (*)     All other components within normal limits   CBC WITH AUTO DIFFERENTIAL - Abnormal; Notable for the following components:    RBC 3.15 (*)     Hemoglobin 9.5 (*)     Hematocrit 27.9 (*)     Immature Grans, Absolute 0.10 (*)     All other components within normal limits   TROPONIN (IN-HOUSE) - Abnormal; Notable for the following components:    Troponin I 0.046 (*)     All other components within normal limits   POCT GLUCOSE FINGERSTICK - Abnormal; Notable for the following components:    Glucose 66 (*)     All other components within normal limits   PROTIME-INR - Normal   APTT - Normal   MAGNESIUM - Normal   PHOSPHORUS - Normal   CK MB - Normal    Narrative:     CKMB Index not indicated   URINALYSIS W/ MICROSCOPIC IF INDICATED (NO CULTURE) - Normal    Narrative:     Urine microscopic not indicated.   RAINBOW DRAW    Narrative:     The following orders were created for panel order [place] Draw.  Procedure                               Abnormality         Status                     ---------                               -----------         ------                     Light Blue Top[[id number]]                                   Final result               Green Top (Gel)[[id number]]                                  Final result               Lavender Top[[id number]]                                     Final result               Red Top[[id number]]                                          Final result                  Please view results for these tests on the individual orders.   POCT GLUCOSE FINGERSTICK   LIGHT BLUE TOP   GREEN TOP   LAVENDER TOP   RED TOP   CBC AND DIFFERENTIAL    Narrative:     The following orders were created for panel order CBC & Differential.  Procedure                               Abnormality         Status                     ---------                               -----------         ------                     CBC Auto Differential[[id number]]        Abnormal            Final result                 Please view results for these tests on the individual orders.                   MDM      Final diagnoses:   Lightheadedness   Hypotension, unspecified hypotension type            Ramón Avery MD  07/12/19 1571

## 2019-07-12 NOTE — OUTREACH NOTE
CT Surgery Week 1 Survey      Responses   Facility patient discharged from?  [place]   Does the patient have one of the following disease processes/diagnoses(primary or secondary)?  Cardiothoracic surgery   Is there a successful TCM telephone encounter documented?  No   Week 1 attempt successful?  Yes   Call start time  0900   Rescheduled  Rescheduled-pt requested [Patient with ER visit today. Call rescheduled for tomorrow ]   Call end time  0900            Sulema Nails RN

## 2019-07-12 NOTE — DISCHARGE INSTRUCTIONS
Dr. Clyde Strauss wants you to stop your lisinopril and follow up with him in the office. Please return here for worsening symptoms, ANY CHEST PAIN or any other issues.

## 2019-07-12 NOTE — TELEPHONE ENCOUNTER
"Mr. Boyer was already in bed; had them place a pillow under his feet at the beginning of the call.  Recheck BP at 0050 = 74/40 HR 95.  Advised to call 911.    Reason for Disposition  • [1] Systolic BP < 90 AND [2] dizzy, lightheaded, or weak    Additional Information  • Negative: Started suddenly after an allergic medicine, an allergic food, or bee sting  • Negative: Shock suspected (e.g., cold/pale/clammy skin, too weak to stand, low BP, rapid pulse)  • Negative: Difficult to awaken or acting confused (e.g., disoriented, slurred speech)  • Negative: Fainted    Answer Assessment - Initial Assessment Questions  1. BLOOD PRESSURE: \"What is the blood pressure?\" \"Did you take at least two measurements 5 minutes apart?\"      87/49 ; 15 minutes earlier it was 96/70 HR 73  2. ONSET: \"When did you take your blood pressure?\"      0030 was the last  3. HOW: \"How did you obtain the blood pressure?\" (e.g., visiting nurse, automatic home BP monitor)      Automatic BP monitor  4. HISTORY: \"Do you have a history of low blood pressure?\" \"What is your blood pressure normally?\"      HTN; took his BP meds about 930 or 10 PM tonight  5. MEDICATIONS: \"Are you taking any medications for blood pressure?\" If yes: \"Have they been changed recently?\"      Metoprolol 25 mg taken tonight  6. PULSE RATE: \"Do you know what your pulse rate is?\"       Current 100  7. OTHER SYMPTOMS: \"Have you been sick recently?\" \"Have you had a recent injury?\"      7/6 CABG; dizzy earlier tonight  8. PREGNANCY: \"Is there any chance you are pregnant?\" \"When was your last menstrual period?\"      na    Protocols used: LOW BLOOD PRESSURE-ADULT-AH      "

## 2019-07-15 ENCOUNTER — READMISSION MANAGEMENT (OUTPATIENT)
Dept: CALL CENTER | Facility: HOSPITAL | Age: 59
End: 2019-07-15

## 2019-07-15 NOTE — OUTREACH NOTE
CT Surgery Week 1 Survey      Responses   Facility patient discharged from?  [place]   Does the patient have one of the following disease processes/diagnoses(primary or secondary)?  Cardiothoracic surgery   Is there a successful TCM telephone encounter documented?  No   Week 1 attempt successful?  Yes   Call start time  1014   Call end time  1018   Discharge diagnosis  CAD s/p multiple cardiac stents to LAD, PTCA of mid LAD, hx ischemic stroke with right side weakness, seizures, DM II, obesity< HTN, HLD, hx thrombocytopenia, S/P CABG x2 with LIMA, Left leg OVH,   Person spoke with today (if not patient) and relationship  wife, Danilea Martino reviewed with patient/caregiver?  Yes   Is the patient having any side effects they believe may be caused by any medication additions or changes?  No   Does the patient have all medications related to this admission filled (includes all antibiotics, pain medications, cardiac medications, etc.)  Yes   Is the patient taking all medications as directed (includes completed medication regime)?  Yes   Comments regarding appointments  saw Dr Tang on 7/12/19   Does the patient have a primary care provider?   Yes   Does the patient have an appointment scheduled with their C/T surgeon?  Yes   Has the patient kept scheduled appointments due by today?  Yes   Has home health visited the patient within 72 hours of discharge?  N/A   Psychosocial issues?  No   Comments  pt had ED visit on 7/12 for recurring A FIb, denies irregularities since visit   Did the patient receive a copy of their discharge instructions?  Yes   Nursing interventions  Reviewed instructions with patient   What is the patient's perception of their health status since discharge?  Improving   Nursing interventions  Nurse provided patient education   Is the patient/caregiver able to teach back normal signs of recovery?  Pain or discomfort at incisional site   Nursing interventions  Reassured on normal signs of recovery   Is  the patient /caregiver able to teach back basic post-op care?  Continue use of incentive spirometry at least 1 week post discharge, Practice 'cough and deep breath', Drive as instructed by MD in discharge instructions, Take showers only when approved by MD-sponge bathe until then, No tub bath, swimming, or hot tub until instructed by MD, Keep incision areas clean, dry and protected, Lifting as instructed by MD in discharge instructions   Is the patient/caregiver able to teach back signs and symptoms of incisional infection?  Increased redness, swelling or pain at the incisonal site, Increased drainage or bleeding, Incisional warmth, Pus or odor from incision, Fever   Is the patient/caregiver able to teach back steps to recovery at home?  Set small, achievable goals for return to baseline health, Rest and rebuild strength, gradually increase activity   Is the patient/caregiver able to teach back the hierarchy of who to call/visit for symptoms/problems? PCP, Specialist, Home health nurse, Urgent Care, ED, 911  Yes   Week 1 call completed?  Yes            Ginny Gill RN

## 2019-07-16 NOTE — PROGRESS NOTES
Subjective   Chief Complaint   Patient presents with   • Post-op Follow-up     CABG x2 on  7/6       Patient ID: Carlos A Boyer Jr. is a 59 y.o. male who is here for follow-up having had Urgent CABG- 2V (left internal mammary artery/left anterior descending and reverse saphenous vein graft/first obtuse marginal) with open left greater saphenous vein harvest performed on 7/6/2019 by Dr. Tang.       History of Present Illness  Post operative recovery was uneventful without any major complications. He was seen in the office last Friday 7/12 after being seen in the ER for lightheadedness and was found to be in atrial fibrillation for brief period of time. Lopressor and Amiodarone were adjusted and lisinopril was discontinued. He returns for follow up today. Has been monitoring home BP and HR and they are unremarkable. Biggest complaint is that his chest is itchy from hair growing back. Sleep habits are good. Pain control has been great. No fevers/sweats/chills. No drainage from incisions. Occasional sternal clicks noted when rolling over in bed but states this is improving. No chest pain or shortness of breath. Appetite is great. Glycemic control is great with reported BG 160s. Denies tobacco use. Ambulating 10-15 minutes twice daily. No further episodes of dizzy and lightheadedness since medication titration.     The following portions of the patient's history were reviewed and updated as appropriate: allergies, current medications, past family history, past medical history, past social history, past surgical history and problem list.    Review of Systems   Constitutional: Negative for chills, diaphoresis, fatigue, fever and unexpected weight change.   Respiratory: Negative for cough and shortness of breath.    Cardiovascular: Positive for chest pain (incisional, improving ). Negative for palpitations and leg swelling.       Objective   Visit Vitals  /70 (BP Location: Left arm, Patient Position: Sitting, Cuff  "Size: Adult)   Pulse 75   Ht 182.9 cm (72\")   Wt 112 kg (246 lb 6.4 oz)   SpO2 98%   BMI 33.42 kg/m²       Physical Exam   Constitutional: He is oriented to person, place, and time. He appears well-developed and well-nourished. No distress.   HENT:   Head: Normocephalic and atraumatic.   Eyes: Pupils are equal, round, and reactive to light.   Neck: Normal range of motion. Neck supple.   Cardiovascular: Normal rate, regular rhythm and normal heart sounds. Exam reveals no friction rub.   No murmur heard.  Pulmonary/Chest: Effort normal and breath sounds normal. No respiratory distress. He has no wheezes. He has no rales.   Abdominal: Soft. He exhibits no distension. There is no tenderness. There is no guarding.   Musculoskeletal: He exhibits edema (trace lower extremity edema).   Neurological: He is alert and oriented to person, place, and time. No cranial nerve deficit.   Skin: Skin is warm and dry. No rash noted. He is not diaphoretic. No pallor.   Sternotomy site clean, dry, and intact. Sternum stable. No clicks. Saphenectomy site clean, dry, and intact.    Psychiatric: He has a normal mood and affect. His behavior is normal.   Vitals reviewed.      Assessment/Plan       Carlos A was seen today for post-op follow-up.    Diagnoses and all orders for this visit:    Coronary artery disease involving native coronary artery of native heart with angina pectoris (CMS/HCC)    Thoracic aortic aneurysm without rupture (CMS/HCC)    Paroxysmal atrial fibrillation (CMS/HCC)    Lung nodules    Class 1 obesity due to excess calories with serious comorbidity and body mass index (BMI) of 33.0 to 33.9 in adult    Type 2 diabetes mellitus without complication, with long-term current use of insulin (CMS/HCC)    Other orders  -     metoprolol tartrate 75 MG tablet; Take 75 mg by mouth 2 (Two) Times a Day.  -     amiodarone (PACERONE) 400 MG tablet; Take 1 tablet by mouth 2 (Two) Times a Day.         Overall, Carlos A SOLOMON Boyer Jr. is doing " well. Medication refills provided. [facility] Continue home monitoring of BP and HR. We discussed current sternotomy precautions and how these will not be advanced yet. Surgical sites healing well. Following post op cardiac surgery home instructions. Provided support and encouragement. All questions have been answered to the best of my ability. Will discuss starting cardiac rehabilitation at official 1 month post op visit. Patient has follow up with Dr. Tang in 6 weeks. Patient has follow up with Cardiology in a 6 weeks. Patient has been instructed to contact our office with any questions or concerns should they arise prior to the next office visit. RTC in 3 weeks for FU with myself.     Patient's Body mass index is 33.42 kg/m². BMI is above normal parameters. Recommendations include: educational material, referral to primary care and establishing durable lifestyle changes to accomplish an acceptable weight for age and height.    Carlos A Boyer Jr. is a non-smoker and therefore does not need tobacco cessation education/counseling.

## 2019-07-17 ENCOUNTER — OFFICE VISIT (OUTPATIENT)
Dept: CARDIAC SURGERY | Facility: CLINIC | Age: 59
End: 2019-07-17

## 2019-07-17 VITALS
HEART RATE: 75 BPM | DIASTOLIC BLOOD PRESSURE: 70 MMHG | BODY MASS INDEX: 33.38 KG/M2 | HEIGHT: 72 IN | OXYGEN SATURATION: 98 % | WEIGHT: 246.4 LBS | SYSTOLIC BLOOD PRESSURE: 124 MMHG

## 2019-07-17 DIAGNOSIS — I25.119 CORONARY ARTERY DISEASE INVOLVING NATIVE CORONARY ARTERY OF NATIVE HEART WITH ANGINA PECTORIS (HCC): Primary | ICD-10-CM

## 2019-07-17 DIAGNOSIS — R91.8 LUNG NODULES: ICD-10-CM

## 2019-07-17 DIAGNOSIS — E11.9 TYPE 2 DIABETES MELLITUS WITHOUT COMPLICATION, WITH LONG-TERM CURRENT USE OF INSULIN (HCC): Chronic | ICD-10-CM

## 2019-07-17 DIAGNOSIS — I48.0 PAROXYSMAL ATRIAL FIBRILLATION (HCC): ICD-10-CM

## 2019-07-17 DIAGNOSIS — E66.09 CLASS 1 OBESITY DUE TO EXCESS CALORIES WITH SERIOUS COMORBIDITY AND BODY MASS INDEX (BMI) OF 33.0 TO 33.9 IN ADULT: ICD-10-CM

## 2019-07-17 DIAGNOSIS — I71.20 THORACIC AORTIC ANEURYSM WITHOUT RUPTURE (HCC): ICD-10-CM

## 2019-07-17 DIAGNOSIS — Z79.4 TYPE 2 DIABETES MELLITUS WITHOUT COMPLICATION, WITH LONG-TERM CURRENT USE OF INSULIN (HCC): Chronic | ICD-10-CM

## 2019-07-17 PROCEDURE — 99024 POSTOP FOLLOW-UP VISIT: CPT | Performed by: NURSE PRACTITIONER

## 2019-07-17 RX ORDER — AMIODARONE HYDROCHLORIDE 400 MG/1
400 TABLET ORAL 2 TIMES DAILY
Qty: 60 TABLET | Refills: 0 | Status: SHIPPED | OUTPATIENT
Start: 2019-07-17 | End: 2019-09-06 | Stop reason: HOSPADM

## 2019-07-17 RX ORDER — METOPROLOL TARTRATE 75 MG/1
75 TABLET, FILM COATED ORAL 2 TIMES DAILY
Qty: 60 TABLET | Refills: 2 | Status: ON HOLD | OUTPATIENT
Start: 2019-07-17 | End: 2019-09-05

## 2019-07-22 ENCOUNTER — TELEPHONE (OUTPATIENT)
Dept: CARDIAC SURGERY | Facility: CLINIC | Age: 59
End: 2019-07-22

## 2019-07-22 RX ORDER — LAMOTRIGINE 200 MG/1
200 TABLET ORAL 2 TIMES DAILY
Qty: 180 TABLET | Refills: 1 | Status: SHIPPED | OUTPATIENT
Start: 2019-07-22 | End: 2020-06-03 | Stop reason: SDUPTHER

## 2019-07-22 RX ORDER — LEVETIRACETAM 500 MG/1
TABLET ORAL
Qty: 630 TABLET | Refills: 1 | Status: SHIPPED | OUTPATIENT
Start: 2019-07-22 | End: 2020-04-30 | Stop reason: SDUPTHER

## 2019-07-22 NOTE — TELEPHONE ENCOUNTER
Called and spoke with patient's wife. She had questions about patient taking amiodarone and lopressor. Answered these questions. She states Saturday they went to store and walked around all day and at the end of the day his vein harvesting leg was swollen. He has kept leg elevated and this has improved. Advised to try compression stocking on leg as well during daytime. Unsure of actual weight gain as patient has not weighed self in the last few days and wife cannot remember weight this morning. Offered office visit but wife declined and states he is doing better and her main concern was questions about medications. His BG has been 89. Asked to call back with problems or concerns and we would place him on the schedule. She verbalized understanding.

## 2019-07-22 NOTE — TELEPHONE ENCOUNTER
Wife calling states pt's leg was very swollen over the weekend (celestine ankle).  Has gone down some since Saturday.  Also states pt has had a 6lb wt gain since Friday.   Wife also has questions about pt's meds and would like to speak with someone re: this since she was not able to be with pt at appt last week./alvaro

## 2019-07-23 ENCOUNTER — READMISSION MANAGEMENT (OUTPATIENT)
Dept: CALL CENTER | Facility: HOSPITAL | Age: 59
End: 2019-07-23

## 2019-07-23 NOTE — OUTREACH NOTE
CT Surgery Week 2 Survey      Responses   Facility patient discharged from?  [place]   Does the patient have one of the following disease processes/diagnoses(primary or secondary)?  Cardiothoracic surgery   Week 2 attempt successful?  Yes   Call start time  1617   Call end time  1621   Discharge diagnosis  CAD s/p multiple cardiac stents to LAD, PTCA of mid LAD, hx ischemic stroke with right side weakness, seizures, DM II, obesity< HTN, HLD, hx thrombocytopenia, S/P CABG x2 with LIMA, Left leg OVH,   Meds reviewed with patient/caregiver?  Yes   Is the patient having any side effects they believe may be caused by any medication additions or changes?  No   Does the patient have all medications related to this admission filled (includes all antibiotics, pain medications, cardiac medications, etc.)  Yes   Is the patient taking all medications as directed (includes completed medication regime)?  Yes   Does the patient have a primary care provider?   Yes   Does the patient have an appointment scheduled with their C/T surgeon?  Yes   Has the patient kept scheduled appointments due by today?  Yes   Has home health visited the patient within 72 hours of discharge?  N/A   Psychosocial issues?  No   Comments  Has some swelling in left foot, but it is improving. He has spoke with doctor and he will obtain compression socks.     Did the patient receive a copy of their discharge instructions?  Yes   Nursing interventions  Reviewed instructions with patient   What is the patient's perception of their health status since discharge?  Improving   Nursing interventions  Nurse provided patient education   Is the patient/caregiver able to teach back normal signs of recovery?  Pain or discomfort at incisional site   Nursing interventions  Reassured on normal signs of recovery   Is the patient /caregiver able to teach back basic post-op care?  Continue use of incentive spirometry at least 1 week post discharge, Practice 'cough and deep  breath', Drive as instructed by MD in discharge instructions, Take showers only when approved by MD-sponge bathe until then, No tub bath, swimming, or hot tub until instructed by MD, Keep incision areas clean, dry and protected, Lifting as instructed by MD in discharge instructions   Is the patient/caregiver able to teach back signs and symptoms of incisional infection?  Increased redness, swelling or pain at the incisonal site, Increased drainage or bleeding, Incisional warmth, Pus or odor from incision, Fever   Is the patient/caregiver able to teach back steps to recovery at home?  Set small, achievable goals for return to baseline health, Rest and rebuild strength, gradually increase activity   Is the patient /caregiver able to teach back the importance of cardiac rehab?  Yes   Is the patient/caregiver able to teach back the hierarchy of who to call/visit for symptoms/problems? PCP, Specialist, Home health nurse, Urgent Care, ED, 911  Yes   Week 2 call completed?  Yes          Mayank Villa RN

## 2019-07-30 ENCOUNTER — READMISSION MANAGEMENT (OUTPATIENT)
Dept: CALL CENTER | Facility: HOSPITAL | Age: 59
End: 2019-07-30

## 2019-07-30 NOTE — OUTREACH NOTE
CT Surgery Week 3 Survey      Responses   Facility patient discharged from?  [place]   Does the patient have one of the following disease processes/diagnoses(primary or secondary)?  Cardiothoracic surgery   Week 3 attempt successful?  No   Unsuccessful attempts  Attempt 1          Sulma Koehler RN

## 2019-07-30 NOTE — PROGRESS NOTES
"Subjective   Patient ID: Carlos A Boyer Jr. is a 59 y.o. male who is here for follow-up having had  Urgent coronary artery bypass grafting-2 vessel (left internal mammary artery/left anterior descending and reverse saphenous vein graft/first obtuse marginal artery), Left open vein harvest, and Left femoral arterial line placement performed on 7/6/2019            History of Present Illness  Post operative recovery was unevent.  He did have paroxysmal atrial fibrillation but had been in NSR and was discharged home.  Overnight he did present to the ED where he was found to have a bout of PAF.  He had some decreased BP and it is noted he spontaneously converted.   Further testing didn't demonstrate an ischemic event.  He did have his ace dc'ed.  Sleeping well.  Good pain control.  No fevers/sweats/chills.   No drainage from incisions.  No sternal clicks.  No chest pain or shortness of breath.  His appetite is good.  He is ambulating well.  He was asked to present to the office.      The following portions of the patient's history were reviewed and updated as appropriate: allergies, current medications, past family history, past medical history, past social history, past surgical history and problem list.        Objective   Visit Vitals  /74 (BP Location: Left arm, Patient Position: Sitting, Cuff Size: Adult)   Pulse 81   Ht 182.9 cm (72\")   Wt 112 kg (247 lb)   SpO2 98%   BMI 33.50 kg/m²       Physical Exam   Constitutional: He is oriented to person, place, and time. He appears well-developed.   HENT:   Head: Normocephalic and atraumatic.   Mouth/Throat: Oropharynx is clear and moist.   Eyes: EOM are normal. Pupils are equal, round, and reactive to light.   Neck: Normal range of motion. Neck supple. No JVD present. No tracheal deviation present. No thyromegaly present.   Cardiovascular: Normal rate, regular rhythm, normal heart sounds and intact distal pulses. Exam reveals no gallop and no friction rub.   No " murmur heard.  Pulmonary/Chest: Effort normal and breath sounds normal. No respiratory distress. He has no wheezes. He has no rales. He exhibits no tenderness.   The sternum is stable. No clicks.  The sternotomy incision is C/D/I.       Abdominal: Soft. He exhibits no distension. There is no tenderness.   Musculoskeletal: Normal range of motion. He exhibits no edema.   Vein harvest site is c/d/i.   Lymphadenopathy:     He has no cervical adenopathy.   Neurological: He is alert and oriented to person, place, and time. No cranial nerve deficit.   Skin: Skin is warm and dry.   Psychiatric: He has a normal mood and affect.         Xr Chest 1 View    Result Date: 7/12/2019  Narrative: EXAMINATION: XR CHEST 1 VW-. 7/12/2019 7:20 AM CDT  CHEST, ONE VIEW:  HISTORY: Hypotension, recent median sternotomy  COMPARISON: 07/07/2019, 7/8/2019 and 7/9/2019  A single frontal chest radiograph was obtained.  FINDINGS:  Lines and port tubes have been removed.  The heart is generous. Pulmonary circulation appropriate without evidence of significant volume overload or heart failure.  There is patchy left suprahilar airspace opacities, small focal atelectasis suspected, noted previously. Some mild patchy left perihilar probable atelectatic changes also observed.                                  Impression: 1. Status post recent median sternotomy. Patchy left upper lobe and perihilar airspace opacities, consider post procedure atelectasis. 2. Generous heart size without radiographic evidence of significant volume overload or heart failure.  This report was finalized on 07/12/2019 07:23 by Dr. Gerardo Darnell MD.    Xr Chest 1 View    Result Date: 7/8/2019  Narrative: EXAMINATION: XR CHEST 1 VW-. 7/8/2019 7:09 AM CDT  CHEST, ONE VIEW:  HISTORY: Status post CABG  COMPARISON: 07/07/2019  A single frontal chest radiograph was obtained.  FINDINGS:  Right IJ introducer sheath has been removed. Mediastinal drain and left chest tube again observed.   Decreasing perihilar atelectasis and interstitial prominence noted. Improved volume status suspected. Persistent probable effusions and mild bibasilar atelectasis observed.                                  Impression: 1. Decreasing central bronchovascular interstitial prominence and atelectatic changes.  This report was finalized on 07/08/2019 07:10 by Dr. Gerardo Darnell MD.    Xr Chest 1 View    Result Date: 7/7/2019  Narrative: EXAMINATION: XR CHEST 1 VW-. 7/7/2019 7:24 AM CDT  CHEST, ONE VIEW:  HISTORY: Status post CABG  COMPARISON: 07/06/2019  A single frontal chest radiograph was obtained.  FINDINGS:  [place]-Amy catheter has been removed. The patient is been extubated. Mediastinal drains and left chest tube observed.  There is decreasing upper lobe airspace opacities/atelectasis.  The level inspiration is shallow with mild central bronchial question interstitial prominence.                                  Impression: 1. Lines port tubes altered as described above. 2. Decreasing bilateral upper lobe airspace opacities/atelectasis  This report was finalized on 07/07/2019 07:25 by Dr. Gerardo Darnell MD.    Xr Chest 1 View    Result Date: 7/6/2019  Narrative: EXAMINATION: XR CHEST 1 VW-. 7/6/2019 3:12 PM CDT  CHEST, ONE VIEW:  HISTORY: Postop CABG  COMPARISON: 05/30/2019, 2/14/2019 and 7/28/2018  A single frontal chest radiograph was obtained.  FINDINGS:  Changes from median sternotomy and CABG observed.  Endotracheal tube, [place]-Amy catheter, mediastinal drains and chest tube well positioned.  There are airspace opacities identified in both upper lobes medially greater on the left suspect postop atelectasis. Correlate with patient presentation.  There are no pneumothoraces.  There is no evidence of significant volume overload or heart failure.                                  Impression: 1. Changes from recent CABG with lines and support tubes well-positioned. 2. Bilateral upper lobe airspace opacities, greater on  the left, postop atelectasis considered.  This report was finalized on 07/06/2019 15:14 by Dr. Gerardo Darnell MD.    Ct Angiogram Chest With Contrast    Result Date: 7/12/2019  Narrative: EXAMINATION: CT ANGIOGRAM CHEST W CONTRAST-  7/12/2019 7:25 AM CDT  HISTORY:Shortness of breath. Recent CABG. Hypotension  COMPARISON: 06/28/2019 CT angiogram chest  TECHNIQUE:  CT evaluation of the chest was performed with intravenous contrast. 2 mm transaxial images were obtained.. Coronal reconstruction maximum intensity projection images of the pulmonary arteries and aorta were also generated.  Radiation dose equals  mGy-cm.  Automated exposure control dose reduction technique was implemented.   FINDINGS:  [Examination was sent to [facility] for preliminary interpretation.  There is a small to moderate size left pleural effusion layering posteriorly. There is a small right pleural effusion. There is mild airspace opacities in the lower lobes, greater on the left, with dependent atelectasis suspected. The upper lung fields are clear.  There are no pneumothoraces.  The pulmonary arteries opacify appropriately with iodinated contrast without intraluminal filling defects or CT angiographic evidence of pulmonary embolus.  There is ectasia of the aortic arch. There is no aortic aneurysm dissection or acute aortic pathology.  Coronary artery calcifications observed. Changes from recent median sternotomy identified. There is no significant pericardial effusion or mediastinal hematoma postprocedural complications identified.  Prominent main pulmonary artery identified suggesting pulmonary hypertension.  There is no mediastinal or hilar lymph node enlargement.  Limited imaging of the upper abdomen demonstrate no acute abnormality.  Scoliotic change of the thoracolumbar spine identified with spondylosis. No acute osseous abnormalities observed.      Impression: 1. No CT angiographic evidence of pulmonary embolus or acute aortic  pathology. 2. Bilateral pleural effusions greater on the left with associated probable atelectasis. This report was finalized on 07/12/2019 07:30 by Dr. Gerardo Darnell MD.    Xr Chest Pa & Lateral    Result Date: 7/9/2019  Narrative: EXAMINATION: XR CHEST PA AND LATERAL-  7/9/2019 7:02 AM CDT  TWO-VIEW CHEST:  HISTORY: Postop CABG  Frontal and lateral projection chest radiograph obtained.  COMPARISON:  07/08/2019  FINDINGS:  Mediastinal drain has been removed. Left-sided chest tube observed.  There is no measurable pneumothorax.  There are linear opacities identified in the perihilar and left lower lobe, small left effusion also observed, atelectasis postop changes.  The right lung is grossly clear.  There is no evidence of significant volume overload or heart failure.                                                                                                                 Impression: 1. Postsurgical changes.   This report was finalized on 07/09/2019 07:03 by Dr. Gerardo Darnell MD.        Assessment/Plan       Carlos A was seen today for post-op follow-up.    Diagnoses and all orders for this visit:    Coronary artery disease involving native coronary artery of native heart with angina pectoris (CMS/HCC)    Stenosis of left carotid artery    Thoracic aortic aneurysm without rupture (CMS/HCC)    Cardiac murmur    Lung nodules    Paroxysmal atrial fibrillation (CMS/HCC)    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CMS/HCC)        Overall, Carlos A CARBAJAL Rosalind Cabello is doing well.      Amiodarone was changed to BID dosing and his lopressor dose was increased to 75 mg BID.  We did discuss the consideration of starting anticoagulation.  We discussed the pros/cons.  He has elected to defer anticoagulation and understands the potential risk of CVA.  Will keep RTC with Skye as planned.  We discussed sternotomy precautions and how they will evolve over the next few months- but no advancing to be considered till a month.  He is a non  smoker.   A return to clinic appoint has been scheduled previously with me.  Patient's Body mass index is 33.5 kg/m². BMI is above normal parameters. Recommendations include: exercise counseling and referral to primary care.

## 2019-07-31 ENCOUNTER — READMISSION MANAGEMENT (OUTPATIENT)
Dept: CALL CENTER | Facility: HOSPITAL | Age: 59
End: 2019-07-31

## 2019-07-31 NOTE — OUTREACH NOTE
CT Surgery Week 3 Survey      Responses   Facility patient discharged from?  [facility]   Does the patient have one of the following disease processes/diagnoses(primary or secondary)?  Cardiothoracic surgery   Week 3 attempt successful?  Yes   Call start time  1315   Call end time  1320   Discharge diagnosis  CAD s/p multiple cardiac stents to LAD, PTCA of mid LAD, hx ischemic stroke with right side weakness, seizures, DM II, obesity< HTN, HLD, hx thrombocytopenia, S/P CABG x2 with LIMA, Left leg OVH,   Has the patient kept scheduled appointments due by today?  Yes   Psychosocial issues?  No   What is the patient's perception of their health status since discharge?  Improving   Nursing interventions  Nurse provided patient education   Is the patient/caregiver able to teach back normal signs of recovery?  Pain or discomfort at incisional site   Nursing interventions  Reassured on normal signs of recovery   Is the patient /caregiver able to teach back basic post-op care?  Drive as instructed by MD in discharge instructions, Take showers only when approved by MD-sponge bathe until then, Lifting as instructed by MD in discharge instructions, Keep incision areas clean, dry and protected   Is the patient/caregiver able to teach back signs and symptoms of incisional infection?  Increased redness, swelling or pain at the incisonal site, Increased drainage or bleeding, Incisional warmth, Pus or odor from incision, Fever   Is the patient/caregiver able to teach back steps to recovery at home?  Rest and rebuild strength, gradually increase activity   Is the patient /caregiver able to teach back the importance of cardiac rehab?  Yes   Nursing interventions  Provided education on importance of cardiac rehab   Is the patient/caregiver able to teach back the hierarchy of who to call/visit for symptoms/problems? PCP, Specialist, Home health nurse, Urgent Care, ED, 911  Yes   Additional teach back comments  Pt doing well. Healing well.  LE edema has improved and pt has been keeping all of his f/u appts.    Week 3 call completed?  Yes          Sulma Koehler RN

## 2019-08-07 ENCOUNTER — OFFICE VISIT (OUTPATIENT)
Dept: CARDIAC SURGERY | Facility: CLINIC | Age: 59
End: 2019-08-07

## 2019-08-07 ENCOUNTER — READMISSION MANAGEMENT (OUTPATIENT)
Dept: CALL CENTER | Facility: HOSPITAL | Age: 59
End: 2019-08-07

## 2019-08-07 VITALS
HEART RATE: 74 BPM | BODY MASS INDEX: 33.05 KG/M2 | SYSTOLIC BLOOD PRESSURE: 122 MMHG | WEIGHT: 244 LBS | OXYGEN SATURATION: 99 % | DIASTOLIC BLOOD PRESSURE: 64 MMHG | HEIGHT: 72 IN

## 2019-08-07 DIAGNOSIS — Z86.73 HISTORY OF STROKE: ICD-10-CM

## 2019-08-07 DIAGNOSIS — R91.8 LUNG NODULES: ICD-10-CM

## 2019-08-07 DIAGNOSIS — E11.9 TYPE 2 DIABETES MELLITUS WITHOUT COMPLICATION, WITH LONG-TERM CURRENT USE OF INSULIN (HCC): Chronic | ICD-10-CM

## 2019-08-07 DIAGNOSIS — I48.0 PAROXYSMAL ATRIAL FIBRILLATION (HCC): ICD-10-CM

## 2019-08-07 DIAGNOSIS — I25.119 CORONARY ARTERY DISEASE INVOLVING NATIVE CORONARY ARTERY OF NATIVE HEART WITH ANGINA PECTORIS (HCC): Primary | ICD-10-CM

## 2019-08-07 DIAGNOSIS — Z79.4 TYPE 2 DIABETES MELLITUS WITHOUT COMPLICATION, WITH LONG-TERM CURRENT USE OF INSULIN (HCC): Chronic | ICD-10-CM

## 2019-08-07 DIAGNOSIS — E66.09 CLASS 1 OBESITY DUE TO EXCESS CALORIES WITH SERIOUS COMORBIDITY AND BODY MASS INDEX (BMI) OF 33.0 TO 33.9 IN ADULT: ICD-10-CM

## 2019-08-07 DIAGNOSIS — I71.20 THORACIC AORTIC ANEURYSM WITHOUT RUPTURE (HCC): ICD-10-CM

## 2019-08-07 PROBLEM — E87.1 HYPONATREMIA: Status: RESOLVED | Noted: 2019-07-11 | Resolved: 2019-08-07

## 2019-08-07 PROCEDURE — 99024 POSTOP FOLLOW-UP VISIT: CPT | Performed by: NURSE PRACTITIONER

## 2019-08-07 NOTE — OUTREACH NOTE
CT Surgery Week 4 Survey      Responses   Facility patient discharged from?  [facility]   Does the patient have one of the following disease processes/diagnoses(primary or secondary)?  Cardiothoracic surgery   Week 4 attempt successful?  Yes   Call start time  1434   Call end time  1436   Discharge diagnosis  CAD s/p multiple cardiac stents to LAD, PTCA of mid LAD, hx ischemic stroke with right side weakness, seizures, DM II, obesity< HTN, HLD, hx thrombocytopenia, S/P CABG x2 with LIMA, Left leg OVH,   Meds reviewed with patient/caregiver?  Yes   Is the patient having any side effects they believe may be caused by any medication additions or changes?  No   Is the patient taking all medications as directed (includes completed medication regime)?  Yes   Has the patient kept scheduled appointments due by today?  Yes   Is the patient still receiving Home Health Services?  No   Psychosocial issues?  No   What is the patient's perception of their health status since discharge?  Improving   Is the patient /caregiver able to teach back the importance of cardiac rehab?  Yes [Will be discussing rehab at his appt today 8/7]   Week 4 Call Completed?  Yes   Would the patient like one additional call?  No   Graduated  Yes   Did the patient feel the follow up calls were helpful during their recovery period?  Yes   Was the number of calls appropriate?  Yes          Sulema Jhaveri RN

## 2019-08-07 NOTE — PROGRESS NOTES
"Subjective   Chief Complaint   Patient presents with   • Post-op Follow-up     CABG x2 on 7/6       Patient ID: Carlos A Boyer Jr. is a 59 y.o. male who is here for follow-up having had Urgent CABG- 2V (left internal mammary artery/left anterior descending and reverse saphenous vein graft/first obtuse marginal) with open left greater saphenous vein harvest performed on 7/6/2019 by Dr. Tang.       History of Present Illness  Post operative recovery was uneventful without any major complications. Had episode of dizziness and lightheadedness as an outpatient and was found to be in atrial fibrillation. Has been on lopressor and amiodarone BID since then with no further episodes. Sleep habits are good. Pain control has been great. No fevers/sweats/chills. No drainage from incisions. No sternal clicks. No chest pain or shortness of breath. Appetite is great. Glycemic control is great with reported BG 160s. Has follow up with endocrinologist next week. Denies tobacco use. He is waling 1/2-1 mile per day.      The following portions of the patient's history were reviewed and updated as appropriate: allergies, current medications, past family history, past medical history, past social history, past surgical history and problem list.    Review of Systems   Constitutional: Negative for chills, diaphoresis, fatigue, fever and unexpected weight change.   Respiratory: Negative for cough and shortness of breath.    Cardiovascular: Negative for chest pain, palpitations and leg swelling.       Objective   Visit Vitals  /64 (BP Location: Right arm, Patient Position: Sitting, Cuff Size: Adult)   Pulse 74   Ht 182.9 cm (72\")   Wt 111 kg (244 lb)   SpO2 99%   BMI 33.09 kg/m²       Physical Exam   Constitutional: He is oriented to person, place, and time. He appears well-developed and well-nourished. No distress.   HENT:   Head: Normocephalic and atraumatic.   Eyes: Pupils are equal, round, and reactive to light.   Neck: Normal " range of motion. Neck supple.   Cardiovascular: Normal rate, regular rhythm and normal heart sounds. Exam reveals no friction rub.   No murmur heard.  Pulmonary/Chest: Effort normal and breath sounds normal. No respiratory distress. He has no wheezes. He has no rales.   Abdominal: Soft. He exhibits no distension. There is no tenderness. There is no guarding.   Musculoskeletal: He exhibits no edema.   Neurological: He is alert and oriented to person, place, and time. No cranial nerve deficit.   Skin: Skin is warm and dry. No rash noted. He is not diaphoretic. No pallor.   Sternotomy site clean, dry, and intact. Sternum stable. No clicks. Saphenectomy site clean, dry, and intact with scabs present. Removed a couple sutures that were exposed and dangling.    Psychiatric: He has a normal mood and affect. His behavior is normal.   Vitals reviewed.      Assessment/Plan       Carlos A was seen today for post-op follow-up.    Diagnoses and all orders for this visit:    Coronary artery disease involving native coronary artery of native heart with angina pectoris (CMS/[facility])  -     Ambulatory Referral to Cardiac Rehab    Thoracic aortic aneurysm without rupture (CMS/[facility])    Paroxysmal atrial fibrillation (CMS/[facility])    Class 1 obesity due to excess calories with serious comorbidity and body mass index (BMI) of 33.0 to 33.9 in adult    Type 2 diabetes mellitus without complication, with long-term current use of insulin (CMS/[facility])    History of stroke    Lung nodules         Overall, Carlos A Boyer Jr. is doing well. No medication changes today. States [facility] to having lopressor and amiodarone refilled. He has upcoming appointment with them next Tuesday and has enough medications to last until then. Continue home BP and HR record. We discussed current sternotomy precautions and how these will advance. Advanced to 10 pound weight restriction with advancement of 5 pound every 2 weeks. He has been  cleared to drive and start cardiac rehab. Provided support and encouragement. All questions have been answered to the best of my ability. Patient has follow up with Dr. Tang in a few weeks. Patient has follow up with Cardiology in a few weeks. Patient has been instructed to contact our office with any questions or concerns should they arise prior to the next office visit.    Patient's Body mass index is 33.09 kg/m². BMI is above normal parameters. Recommendations include: educational material, referral to primary care and establishing durable lifestyle changes to accomplish an acceptable weight for age and height.    Carlos A Boyer Jr. is a non-smoker and therefore does not need tobacco cessation education/counseling.      Will need FU of lung nodules, thoracic aortic aneurysm, and carotid artery stenosis.

## 2019-08-14 DIAGNOSIS — Z95.1 S/P CABG (CORONARY ARTERY BYPASS GRAFT): Primary | ICD-10-CM

## 2019-08-21 ENCOUNTER — OFFICE VISIT (OUTPATIENT)
Dept: CARDIOLOGY | Facility: CLINIC | Age: 59
End: 2019-08-21

## 2019-08-21 VITALS
OXYGEN SATURATION: 98 % | SYSTOLIC BLOOD PRESSURE: 128 MMHG | DIASTOLIC BLOOD PRESSURE: 80 MMHG | WEIGHT: 244 LBS | HEIGHT: 72 IN | BODY MASS INDEX: 33.05 KG/M2 | HEART RATE: 87 BPM

## 2019-08-21 DIAGNOSIS — R10.13 EPIGASTRIC PAIN: ICD-10-CM

## 2019-08-21 DIAGNOSIS — G47.33 OSA ON CPAP: ICD-10-CM

## 2019-08-21 DIAGNOSIS — I65.22 STENOSIS OF LEFT CAROTID ARTERY: ICD-10-CM

## 2019-08-21 DIAGNOSIS — E11.8 DIABETIC COMPLICATION (HCC): ICD-10-CM

## 2019-08-21 DIAGNOSIS — I48.0 PAROXYSMAL ATRIAL FIBRILLATION (HCC): ICD-10-CM

## 2019-08-21 DIAGNOSIS — I10 ESSENTIAL HYPERTENSION: Primary | ICD-10-CM

## 2019-08-21 DIAGNOSIS — Z86.73 HISTORY OF STROKE: ICD-10-CM

## 2019-08-21 DIAGNOSIS — Z79.4 TYPE 2 DIABETES MELLITUS WITHOUT COMPLICATION, WITH LONG-TERM CURRENT USE OF INSULIN (HCC): Chronic | ICD-10-CM

## 2019-08-21 DIAGNOSIS — E11.9 TYPE 2 DIABETES MELLITUS WITHOUT COMPLICATION, WITH LONG-TERM CURRENT USE OF INSULIN (HCC): Chronic | ICD-10-CM

## 2019-08-21 DIAGNOSIS — I71.20 THORACIC AORTIC ANEURYSM WITHOUT RUPTURE (HCC): ICD-10-CM

## 2019-08-21 DIAGNOSIS — G40.909 SEIZURE DISORDER (HCC): ICD-10-CM

## 2019-08-21 DIAGNOSIS — E11.42 DIABETIC PERIPHERAL NEUROPATHY (HCC): ICD-10-CM

## 2019-08-21 DIAGNOSIS — Z99.89 OSA ON CPAP: ICD-10-CM

## 2019-08-21 PROCEDURE — 99024 POSTOP FOLLOW-UP VISIT: CPT | Performed by: INTERNAL MEDICINE

## 2019-08-21 NOTE — PROGRESS NOTES
Carlos A Boyer Jr.  [id number]  1960  59 y.o.  male    Referring Provider: Vinayak Correia PA    Reason for Follow-up Visit: Here for routine follow up   coronary artery disease stented coronary artery   Type 2 diabetes mellitus   S/p CABG       Subjective    Overall feeling well   No chest pain or shortness of breath     No palpitations  No significant pedal edema    Compliant with medications and dietary advice  Good effort tolerance    No presyncope or syncope  Compliant with medications      Excellent effort tolerance with no cardiovascular limitations and at the patient's baseline   Overall feels dramatically better     Excellent effort tolerance with no cardiovascular limitations and at the patient's baseline     Due to start cardiac rehab next week  BP well controlled at home.     Blood sugars well controlled at home    History of present illness:  Carlos A Boyer Jr. is a 59 y.o. yo male with history of chest pain who presents today for   Chief Complaint   Patient presents with   • Coronary Artery Disease     6 wk d/c f/u - s/p CABG 7/   .    History  Past Medical History:   Diagnosis Date   • Arthritis    • Asthma    • Carotid disease, bilateral (CMS/HCC)    • Chest pain    • Chronic ischemic heart disease    • Chronic sinusitis    • Maribell bullosa    • Coronary artery disease    • Deviated septum    • Diabetes mellitus (CMS/HCC)    • Difficulty urinating    • Diverticulitis    • Enlarged prostate    • Fatty liver    • GERD (gastroesophageal reflux disease)    • Hyperlipidemia    • Hypertension    • Hypertrophy of nasal turbinates    • Keratoderma    • Kidney stone    • Migraine    • Murmur, heart    • Myocardial infarction (CMS/HCC)    • Obesity    • PONV (postoperative nausea and vomiting)    • Psoriasis    • Seizures (CMS/HCC)    • Sinus congestion    • Sleep apnea    • SOB (shortness of breath)    • Stroke (CMS/HCC)    • UTI (urinary tract infection)    ,   Past Surgical History:   Procedure  Laterality Date   • CARDIAC CATHETERIZATION  01/2016    Dr. Broadbent; widely patent previously placed stents in the left anterior descending and obstructive disease involving the diagonal branch which was treated medically   • CARDIAC CATHETERIZATION N/A 7/14/2017    Procedure: Left Heart Cath;  Surgeon: Wade Ramey MD;  Location:  [facility];  Service:    • CARDIAC CATHETERIZATION Left 10/15/2018    Procedure: Cardiac Catheterization/Vascular Study;  Surgeon: Wade Ramey MD;  Location:  [facility];  Service: Cardiology   • CARDIAC CATHETERIZATION  10/15/2018    Procedure: Functional Flow [place];  Surgeon: Wade Ramey MD;  Location:  [facility];  Service: Cardiology   • CARDIAC CATHETERIZATION N/A 10/15/2018    Procedure: Left ventriculography;  Surgeon: Wade Ramey MD;  Location:  [facility];  Service: Cardiology   • CARDIAC CATHETERIZATION Left 6/26/2019    Procedure: Cardiac Catheterization/Vascular Study VEL OK  HE WILL WAIT 1 YEAR FOR SHOULDER SURGERY ;  Surgeon: Wade Ramey MD;  Location:  [facility];  Service: Cardiology   • CHOLECYSTECTOMY WITH INTRAOPERATIVE CHOLANGIOGRAM N/A 8/1/2018    Procedure: CHOLECYSTECTOMY LAPAROSCOPIC INTRAOPERATIVE CHOLANGIOGRAM;  Surgeon: Shane Ann MD;  Location:  [facility] OR;  Service: General   • CORONARY ANGIOPLASTY     • CORONARY ARTERY BYPASS GRAFT N/A 7/6/2019    Procedure: CABG X2 WITH LIMA, LEFT LEG OVH, AND PLACEMENT OF LEFT FEMORAL ARTERIAL LINE;  Surgeon: Steven Tang MD;  Location:  [facility] OR;  Service: Cardiothoracic   • CORONARY STENT PLACEMENT      x 6   • ENDOSCOPIC FUNCTIONAL SINUS SURGERY (FESS) Bilateral 12/13/2017    Procedure: PROCEDURE PERFORMED:  Bilateral functional endoscopic anterior ethmoidectomy with bilateral middle meatal antrostomy Septoplasty Right kathia bullosa resection Bilateral inferior turbinate reduction via Coblation;  Surgeon:  Mayank Ibarra MD;  Location: [facility] OR;  Service:    • ENDOSCOPY N/A 2018    Procedure: ESOPHAGOGASTRODUODENOSCOPY WITH ANESTHESIA;  Surgeon: Benitez Mas MD;  Location: [facility] ENDOSCOPY;  Service: Gastroenterology   • HERNIA REPAIR      x2 inguinal area   • KIDNEY STONE SURGERY     • KNEE SURGERY Right    • OTHER SURGICAL HISTORY      urolift   • PROSTATE SURGERY      Dr. Badillo -    • THUMB AMPUTATION Left     partial   • TOE AMPUTATION Right     big   ,   Family History   Problem Relation Age of Onset   • Heart disease Father    • No Known Problems Mother    ,   Social History     Tobacco Use   • Smoking status: Former Smoker     Years: 4.50     Types: Cigarettes     Last attempt to quit:      Years since quittin.6   • Smokeless tobacco: Former User     Types: Chew     Quit date:    Substance Use Topics   • Alcohol use: No   • Drug use: No   ,     Medications  Current Outpatient Medications   Medication Sig Dispense Refill   • albuterol (PROVENTIL HFA;VENTOLIN HFA) 108 (90 BASE) MCG/ACT inhaler Inhale 2 puffs Every 6 (Six) Hours As Needed for wheezing.     • amiodarone (PACERONE) 400 MG tablet Take 1 tablet by mouth 2 (Two) Times a Day. (Patient taking differently: Take 200 mg by mouth 2 (Two) Times a Day.) 60 tablet 0   • aspirin 81 MG chewable tablet Chew 81 mg Daily.     • carboxymethylcellulose (REFRESH PLUS) 0.5 % solution Administer 1 drop to both eyes 4 (Four) Times a Day As Needed for Dry Eyes.     • Empagliflozin (JARDIANCE) 10 MG tablet Take 1 tablet by mouth Daily.     • fluticasone (FLONASE) 50 MCG/ACT nasal spray 2 sprays into each nostril Daily.     • gabapentin (NEURONTIN) 100 MG capsule Take 1 capsule by mouth Every Night. 30 capsule 2   • insulin aspart (novoLOG FLEXPEN) 100 UNIT/ML solution pen-injector sc pen Inject 30 Units under the skin into the appropriate area as directed Every Morning. 34 units  at breakfast, 30 units at lunch, 34 units hs     • Insulin  Glargine (LANTUS SOLOSTAR) 100 UNIT/ML injection pen Inject 60-90 Units under the skin into the appropriate area as directed 2 (Two) Times a Day. 90 units @ night  60 units in morning     • lamoTRIgine (LaMICtal) 200 MG tablet Take 1 tablet by mouth 2 (Two) Times a Day. 180 tablet 1   • levETIRAcetam (KEPPRA) 500 MG tablet Take 3 tablets every morning, take 4 tablets every night. 630 tablet 1   • Liraglutide (VICTOZA SC) Inject 1.2 mg under the skin into the appropriate area as directed Every Night. 1.2     • metFORMIN (GLUCOPHAGE) 1000 MG tablet Take 1 tablet by mouth 2 (Two) Times a Day With Meals. HOLD x 48 hours post contrast. May resume 3/18/18     • metoprolol tartrate 75 MG tablet Take 75 mg by mouth 2 (Two) Times a Day. 60 tablet 2   • Multiple Vitamin (MULTI VITAMIN PO) Take 1 tablet by mouth Daily.     • nitroglycerin (NITROSTAT) 0.4 MG SL tablet Place 0.4 mg under the tongue Every 5 (Five) Minutes As Needed for chest pain. Take no more than 3 doses in 15 minutes.     • ondansetron ODT (ZOFRAN-ODT) 4 MG disintegrating tablet Take 1 tablet by mouth Every 4 (Four) Hours As Needed for Nausea or Vomiting. 10 tablet 0   • oxyCODONE-acetaminophen (PERCOCET) 5-325 MG per tablet Take 1 tablet by mouth Every 6 (Six) Hours As Needed (pain). 30 tablet 0   • pantoprazole (PROTONIX) 40 MG EC tablet Take 40 mg by mouth 2 (Two) Times a Day.     • psyllium (METAMUCIL) 58.6 % packet Take 1 packet by mouth Daily As Needed (constipation).     • rosuvastatin (CRESTOR) 40 MG tablet Take 20 mg by mouth Every Night.       No current facility-administered medications for this visit.        Allergies:  Flagyl [metronidazole]; Atorvastatin; and Ciprofloxacin    Review of Systems  Review of Systems   Constitution: Positive for weakness and malaise/fatigue.   HENT: Negative.    Eyes: Negative.    Cardiovascular: Positive for dyspnea on exertion. Negative for chest pain, claudication, cyanosis, irregular heartbeat, leg swelling,  "near-syncope, orthopnea, palpitations, paroxysmal nocturnal dyspnea and syncope.   Respiratory: Negative.    Endocrine: Negative.    Hematologic/Lymphatic: Negative.    Skin: Negative.    Musculoskeletal: Positive for arthritis and back pain.   Gastrointestinal: Negative for anorexia.   Genitourinary: Negative.    Neurological: Positive for dizziness and light-headedness.   Psychiatric/Behavioral: Negative.        Objective     Physical Exam:      Vitals:    08/21/19 0923   BP: 128/80   Pulse: 87   SpO2: 98%   Weight: 111 kg (244 lb)   Height: 182.9 cm (72\")       /80   Pulse 87   Ht 182.9 cm (72\")   Wt 111 kg (244 lb)   SpO2 98%   BMI 33.09 kg/m²   Physical Exam   Constitutional: He appears well-developed.   HENT:   Head: Normocephalic.   Neck: Normal carotid pulses and no JVD present. No tracheal tenderness present. Carotid bruit is not present. No tracheal deviation and no edema present.   Cardiovascular: Regular rhythm, normal heart sounds and normal pulses.   Pulmonary/Chest: Effort normal. No stridor.   Abdominal: Soft.   Neurological: He is alert. He has normal strength. No cranial nerve deficit or sensory deficit.   Skin: Skin is warm.   Psychiatric: He has a normal mood and affect. His speech is normal and behavior is normal.     Wounds healing very well. No evidence of excessive bruising, pain, redness, swelling, discharge, foul odor or associated fever or chills.     Results Review:      Conclusion of cardiac catheterization     Left main coronary arteries normal  Left anterior descending coronary artery has patent stents  The distal edge of the stent in the mid left anterior descending coronary artery has approximately 60% stenosis  Highly abnormal fractional flow reserve of this at 0.78 without adenosine stress.  PTCA done of the segment.  Despite multiple attempts and use of guide liner could not advance the stent due to earlier implanted stents.  Left circumflex coronary artery is " codominant and large  The proximal portion of the first obtuse marginal branch has a 60% stenosis with normal fractional flow reserve above 0.85.  Right coronary artery is large and codominant without any high-grade lesions.     Left ventricular end-diastolic pressure is mildly elevated to 15 mmHg.  No gradient across aortic valve on pullback.  Left ventriculography shows a hyperdynamic left ventricle with ejection fraction of 75%.     Percutaneous coronary intervention     XB 3.5 guide advised used for fractional flow reserve of the obtuse marginal branch as well as of the left anterior descending coronary artery  Then we did try to advance a 2.5 mm stent.  We used 2 different size stents and could not cross  We used a guide liner and does not did not have placed cross across the stent.  We then did balloon angioplasty using 2.0 mm balloon.  Stenosis was reduced from 60% down to less than 10%.  BEVERLEY-3 flow before and after procedure.           ____________________________________________________________________________________________________________________________________________     Plan after cardiac catheterization        Dual antiplatelet therapy minimum of 1 year preferably longer  Statin ACE inhibitors beta-blockers  If there is repeat restenosis of the left anterior descending coronary artery would consider surgical revascularization.  Target LDL less than 70 mg/dL.  Maximize antianginal therapy.  Intensive risk factor modifications for both primary and secondary prevention if applicable  Hydration   Observation     Results for orders placed during the hospital encounter of 06/26/19   Intra-Op TAVR Transesophageal Echo    Narrative · Estimated EF = 60%.  · Left ventricular systolic function is normal.  · Mild mitral valve regurgitation is present  · Mild aortic valve regurgitation is present.  · Post op no free air      10/15/18        Conclusion     The left main coronary artery is normal  Left anterior  descending coronary artery and diagonal branches are patent with a widely patent stent noted in the  left anterior descending coronary artery    Mid left anterior descending coronary artery is a 50% stenosis and hemodynamically not significant.  Left circumflex coronary artery is large with a first obtuse marginal around 40-60% stenosis.  Fractional flow reserve of this is completely normal and at above 0.9.  Left circumflex coronary artery is a codominant.  Right coronary artery is codominant large with distal small vessel disease.     Left ventricular end-diastolic pressure is normal at 11 mmHg no gradient across aortic valve on pullback  Left ventriculography shows ejection fraction of 55%.           Plan     Continue current medication  Symptomatic treatment for small vessel disease with antianginals  If stable can be discharged home later today  Intensive risk factor modifications for both primary and secondary prevention if applicable  Hydration   Observation         7/17 cath  Conclusion  Nonocclusive epicardial coronary arteries with widely patent stents in the  left anterior descending coronary artery   artery and left circumflex coronary artery.  Hyperdynamic left ventricle with ejection fraction of 75%.  LVEDP   17    mm Hg     Plan  Workup for noncardiac causes of chest pain this can be done as outpatient.  His d-dimer is within normal range  After a period of observation of stable can be discharged either later today.  Keep follow-up appointment with me  Ongoing risk factor modifications keep LDL below 70 mg/dL  Hydration   Observation     Procedures    Assessment/Plan   Patient Active Problem List   Diagnosis   • Type 2 diabetes mellitus without complication, with long-term current use of insulin (CMS/[facility])   • Gastroesophageal reflux disease without esophagitis   • Essential hypertension   • Seizure disorder (CMS/HCC)   • Carotid disease, bilateral (CMS/HCC)   • Coronary artery disease involving native  coronary artery of native heart with angina pectoris (CMS/HCC)   • Mixed hyperlipidemia   • Chronic left arterial ischemic stroke, ICA (internal carotid artery)   • HOA on CPAP   • Benign non-nodular prostatic hyperplasia with lower urinary tract symptoms   • Deviated nasal septum   • Maribell bullosa   • Hypertrophy of both inferior nasal turbinates   • Chronic sinusitis of both maxillary sinuses   • S/P FESS (functional endoscopic sinus surgery)   • Diabetic complication (CMS/HCC)   • Epigastric pain   • Diabetic peripheral neuropathy (CMS/HCC)   • Abnormal stress test   • Stenosis of left carotid artery   • Class 1 obesity in adult   • 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CMS/HCC)   • History of stroke   • Lung nodules   • Cardiac murmur   • Thoracic aortic aneurysm without rupture (CMS/HCC)   • Paroxysmal atrial fibrillation (CMS/HCC)           Plan       The current medical regimen is effective;  continue present plan and medications.       Start Cardiac rehab next week    Keep A1c less than 7 Primary to monitor  Keep LDL below 70 mg/dl. Monitor liver and renal functions.   Monitor CBC, CMP, TSH (as indicated) and Lipid Panel by primary      S/L NTG PRN for chest pain, call me or go to ER if has to use S/L nitroglycerin     ____________________________________________________________________________________________________________________________________________  Health maintenance and recommendations      Similar recommendations as last visit     Offered to give patient  a copy      Questions were encouraged, asked and answered to the patient's  understanding and satisfaction. Questions if any regarding current medications and side effects, need for refills and importance of compliance to medications stressed.    Reviewed available prior notes, consults, prior visits, laboratory findings, radiology and cardiology relevant reports. Updated chart as applicable. I have reviewed the patient's medical history in  detail and updated the computerized patient record as relevant.      Updated patient regarding any new or relevant abnormalities on review of records or any new findings on physical exam. Mentioned to patient about purpose of visit and desirable health short and long term goals and objectives.    Primary to monitor CBC CMP Lipid panel and TSH as applicable    ___________________________________________________________________________________________________________________________________________              Return in about 4 months (around 12/21/2019).

## 2019-08-26 ENCOUNTER — TREATMENT (OUTPATIENT)
Dept: CARDIAC REHAB | Facility: HOSPITAL | Age: 59
End: 2019-08-26

## 2019-08-26 DIAGNOSIS — Z95.1 S/P CABG (CORONARY ARTERY BYPASS GRAFT): Primary | ICD-10-CM

## 2019-08-26 PROCEDURE — 93798 PHYS/QHP OP CAR RHAB W/ECG: CPT

## 2019-08-27 ENCOUNTER — OFFICE VISIT (OUTPATIENT)
Dept: CARDIAC SURGERY | Facility: CLINIC | Age: 59
End: 2019-08-27

## 2019-08-27 VITALS
HEIGHT: 72 IN | OXYGEN SATURATION: 98 % | DIASTOLIC BLOOD PRESSURE: 82 MMHG | HEART RATE: 92 BPM | SYSTOLIC BLOOD PRESSURE: 128 MMHG | BODY MASS INDEX: 33.08 KG/M2 | WEIGHT: 244.2 LBS

## 2019-08-27 DIAGNOSIS — I77.9 BILATERAL CAROTID ARTERY DISEASE, UNSPECIFIED TYPE (HCC): Primary | ICD-10-CM

## 2019-08-27 DIAGNOSIS — E11.42 DIABETIC PERIPHERAL NEUROPATHY (HCC): ICD-10-CM

## 2019-08-27 DIAGNOSIS — G40.909 SEIZURE DISORDER (HCC): ICD-10-CM

## 2019-08-27 DIAGNOSIS — E11.8 DIABETIC COMPLICATION (HCC): ICD-10-CM

## 2019-08-27 DIAGNOSIS — I25.119 CORONARY ARTERY DISEASE INVOLVING NATIVE CORONARY ARTERY OF NATIVE HEART WITH ANGINA PECTORIS (HCC): ICD-10-CM

## 2019-08-27 DIAGNOSIS — I71.20 THORACIC AORTIC ANEURYSM WITHOUT RUPTURE (HCC): ICD-10-CM

## 2019-08-27 DIAGNOSIS — E66.09 CLASS 1 OBESITY DUE TO EXCESS CALORIES WITH SERIOUS COMORBIDITY AND BODY MASS INDEX (BMI) OF 33.0 TO 33.9 IN ADULT: ICD-10-CM

## 2019-08-27 DIAGNOSIS — E11.9 TYPE 2 DIABETES MELLITUS WITHOUT COMPLICATION, WITH LONG-TERM CURRENT USE OF INSULIN (HCC): Chronic | ICD-10-CM

## 2019-08-27 DIAGNOSIS — D69.1 ASPIRIN-LIKE PLATELET FUNCTION DEFECT (HCC): ICD-10-CM

## 2019-08-27 DIAGNOSIS — I48.0 PAROXYSMAL ATRIAL FIBRILLATION (HCC): ICD-10-CM

## 2019-08-27 DIAGNOSIS — Z79.4 TYPE 2 DIABETES MELLITUS WITHOUT COMPLICATION, WITH LONG-TERM CURRENT USE OF INSULIN (HCC): Chronic | ICD-10-CM

## 2019-08-27 PROCEDURE — 99024 POSTOP FOLLOW-UP VISIT: CPT | Performed by: THORACIC SURGERY (CARDIOTHORACIC VASCULAR SURGERY)

## 2019-08-28 ENCOUNTER — TREATMENT (OUTPATIENT)
Dept: CARDIAC REHAB | Facility: HOSPITAL | Age: 59
End: 2019-08-28

## 2019-08-28 DIAGNOSIS — Z95.1 S/P CABG (CORONARY ARTERY BYPASS GRAFT): Primary | ICD-10-CM

## 2019-08-28 PROCEDURE — 93798 PHYS/QHP OP CAR RHAB W/ECG: CPT

## 2019-08-29 ENCOUNTER — TELEPHONE (OUTPATIENT)
Dept: CARDIAC SURGERY | Facility: CLINIC | Age: 59
End: 2019-08-29

## 2019-08-29 NOTE — TELEPHONE ENCOUNTER
Called and discussed with wife and answered her questions. They will call back tomorrow to touch base. Patient asymptomatic.

## 2019-08-29 NOTE — TELEPHONE ENCOUNTER
"Wife calling.  States pt has been in [facility] Fib since his surgery on 7-6-19.  [facility] was seen [facility] for [facility] appt and they told him they \"did not feel anything abnormal\" but they only checked his pulse.  Wife states pt reports pulse as 120.  Calling to find out what, if anything, they need to do.  Cardiologist is Dr Ramey.  Does this need to be addressed by his office?/alvaro  "

## 2019-08-30 ENCOUNTER — APPOINTMENT (OUTPATIENT)
Dept: CT IMAGING | Facility: HOSPITAL | Age: 59
End: 2019-08-30

## 2019-08-30 ENCOUNTER — APPOINTMENT (OUTPATIENT)
Dept: GENERAL RADIOLOGY | Facility: HOSPITAL | Age: 59
End: 2019-08-30

## 2019-08-30 ENCOUNTER — HOSPITAL ENCOUNTER (EMERGENCY)
Facility: HOSPITAL | Age: 59
Discharge: HOME | End: 2019-08-30
Admitting: EMERGENCY MEDICINE

## 2019-08-30 ENCOUNTER — TREATMENT (OUTPATIENT)
Dept: CARDIAC REHAB | Facility: HOSPITAL | Age: 59
End: 2019-08-30

## 2019-08-30 ENCOUNTER — TELEPHONE (OUTPATIENT)
Dept: CARDIAC SURGERY | Facility: CLINIC | Age: 59
End: 2019-08-30

## 2019-08-30 VITALS
WEIGHT: 244 LBS | BODY MASS INDEX: 33.05 KG/M2 | OXYGEN SATURATION: 100 % | HEIGHT: 72 IN | SYSTOLIC BLOOD PRESSURE: 110 MMHG | TEMPERATURE: 98 F | DIASTOLIC BLOOD PRESSURE: 89 MMHG | HEART RATE: 128 BPM | RESPIRATION RATE: 17 BRPM

## 2019-08-30 DIAGNOSIS — I48.92 ATRIAL FLUTTER, UNSPECIFIED TYPE (HCC): Primary | ICD-10-CM

## 2019-08-30 DIAGNOSIS — Z95.1 S/P CABG (CORONARY ARTERY BYPASS GRAFT): Primary | ICD-10-CM

## 2019-08-30 LAB
ALBUMIN SERPL-MCNC: 4.3 G/DL (ref 3.5–5)
ALBUMIN/GLOB SERPL: 1.3 G/DL (ref 1.1–2.5)
ALP SERPL-CCNC: 98 U/L (ref 24–120)
ALT SERPL W P-5'-P-CCNC: 30 U/L (ref 0–54)
ANION GAP SERPL CALCULATED.3IONS-SCNC: 8 MMOL/L (ref 4–13)
AST SERPL-CCNC: 32 U/L (ref 7–45)
BASOPHILS # BLD AUTO: 0.03 10*3/MM3 (ref 0–0.2)
BASOPHILS NFR BLD AUTO: 0.6 % (ref 0–1.5)
BILIRUB SERPL-MCNC: 0.6 MG/DL (ref 0.1–1)
BUN BLD-MCNC: 22 MG/DL (ref 5–21)
BUN/CREAT SERPL: 20.6 (ref 7–25)
CALCIUM SPEC-SCNC: 9.8 MG/DL (ref 8.4–10.4)
CHLORIDE SERPL-SCNC: 109 MMOL/L (ref 98–110)
CO2 SERPL-SCNC: 22 MMOL/L (ref 24–31)
CREAT BLD-MCNC: 1.07 MG/DL (ref 0.5–1.4)
D DIMER PPP FEU-MCNC: 1.64 MG/L (FEU) (ref 0–0.5)
DEPRECATED RDW RBC AUTO: 41.6 FL (ref 37–54)
EOSINOPHIL # BLD AUTO: 0.16 10*3/MM3 (ref 0–0.4)
EOSINOPHIL NFR BLD AUTO: 3.5 % (ref 0.3–6.2)
ERYTHROCYTE [DISTWIDTH] IN BLOOD BY AUTOMATED COUNT: 13.3 % (ref 12.3–15.4)
GFR SERPL CREATININE-BSD FRML MDRD: 70 ML/MIN/1.73
GLOBULIN UR ELPH-MCNC: 3.2 GM/DL
GLUCOSE BLD-MCNC: 228 MG/DL (ref 70–100)
HCT VFR BLD AUTO: 40.1 % (ref 37.5–51)
HGB BLD-MCNC: 12.7 G/DL (ref 13–17.7)
HOLD SPECIMEN: NORMAL
HOLD SPECIMEN: NORMAL
IMM GRANULOCYTES # BLD AUTO: 0.01 10*3/MM3 (ref 0–0.05)
IMM GRANULOCYTES NFR BLD AUTO: 0.2 % (ref 0–0.5)
INR PPP: 0.98 (ref 0.91–1.09)
LYMPHOCYTES # BLD AUTO: 1.34 10*3/MM3 (ref 0.7–3.1)
LYMPHOCYTES NFR BLD AUTO: 29 % (ref 19.6–45.3)
MAGNESIUM SERPL-MCNC: 1.9 MG/DL (ref 1.4–2.2)
MCH RBC QN AUTO: 27 PG (ref 26.6–33)
MCHC RBC AUTO-ENTMCNC: 31.7 G/DL (ref 31.5–35.7)
MCV RBC AUTO: 85.1 FL (ref 79–97)
MONOCYTES # BLD AUTO: 0.38 10*3/MM3 (ref 0.1–0.9)
MONOCYTES NFR BLD AUTO: 8.2 % (ref 5–12)
NEUTROPHILS # BLD AUTO: 2.7 10*3/MM3 (ref 1.7–7)
NEUTROPHILS NFR BLD AUTO: 58.5 % (ref 42.7–76)
NRBC BLD AUTO-RTO: 0 /100 WBC (ref 0–0.2)
PLATELET # BLD AUTO: 153 10*3/MM3 (ref 140–450)
PMV BLD AUTO: 11.5 FL (ref 6–12)
POTASSIUM BLD-SCNC: 4.4 MMOL/L (ref 3.5–5.3)
PROT SERPL-MCNC: 7.5 G/DL (ref 6.3–8.7)
PROTHROMBIN TIME: 13.3 SECONDS (ref 11.9–14.6)
RBC # BLD AUTO: 4.71 10*6/MM3 (ref 4.14–5.8)
SODIUM BLD-SCNC: 139 MMOL/L (ref 135–145)
TROPONIN I SERPL-MCNC: 0.01 NG/ML (ref 0–0.03)
TROPONIN I SERPL-MCNC: <0.012 NG/ML (ref 0–0.03)
TSH SERPL DL<=0.05 MIU/L-ACNC: 1.67 UIU/ML (ref 0.47–4.68)
WBC NRBC COR # BLD: 4.62 10*3/MM3 (ref 3.4–10.8)
WHOLE BLOOD HOLD SPECIMEN: NORMAL
WHOLE BLOOD HOLD SPECIMEN: NORMAL

## 2019-08-30 PROCEDURE — 99284 EMERGENCY DEPT VISIT MOD MDM: CPT | Performed by: NURSE PRACTITIONER

## 2019-08-30 PROCEDURE — 84484 ASSAY OF TROPONIN QUANT: CPT | Performed by: NURSE PRACTITIONER

## 2019-08-30 PROCEDURE — 83735 ASSAY OF MAGNESIUM: CPT | Performed by: NURSE PRACTITIONER

## 2019-08-30 PROCEDURE — 99284 EMERGENCY DEPT VISIT MOD MDM: CPT

## 2019-08-30 PROCEDURE — 71045 X-RAY EXAM CHEST 1 VIEW: CPT

## 2019-08-30 PROCEDURE — 93005 ELECTROCARDIOGRAM TRACING: CPT | Performed by: EMERGENCY MEDICINE

## 2019-08-30 PROCEDURE — 85610 PROTHROMBIN TIME: CPT | Performed by: NURSE PRACTITIONER

## 2019-08-30 PROCEDURE — 84443 ASSAY THYROID STIM HORMONE: CPT | Performed by: NURSE PRACTITIONER

## 2019-08-30 PROCEDURE — 93005 ELECTROCARDIOGRAM TRACING: CPT | Performed by: NURSE PRACTITIONER

## 2019-08-30 PROCEDURE — 85025 COMPLETE CBC W/AUTO DIFF WBC: CPT | Performed by: NURSE PRACTITIONER

## 2019-08-30 PROCEDURE — 93010 ELECTROCARDIOGRAM REPORT: CPT | Performed by: INTERNAL MEDICINE

## 2019-08-30 PROCEDURE — 80053 COMPREHEN METABOLIC PANEL: CPT | Performed by: NURSE PRACTITIONER

## 2019-08-30 PROCEDURE — 71275 CT ANGIOGRAPHY CHEST: CPT

## 2019-08-30 PROCEDURE — 0 IOPAMIDOL PER 1 ML: Performed by: NURSE PRACTITIONER

## 2019-08-30 PROCEDURE — 85379 FIBRIN DEGRADATION QUANT: CPT | Performed by: NURSE PRACTITIONER

## 2019-08-30 PROCEDURE — 93798 PHYS/QHP OP CAR RHAB W/ECG: CPT

## 2019-08-30 RX ORDER — SODIUM CHLORIDE 0.9 % (FLUSH) 0.9 %
10 SYRINGE (ML) INJECTION AS NEEDED
Status: DISCONTINUED | OUTPATIENT
Start: 2019-08-30 | End: 2019-08-30 | Stop reason: HOSPADM

## 2019-08-30 RX ADMIN — IOPAMIDOL 86 ML: 755 INJECTION, SOLUTION INTRAVENOUS at 14:35

## 2019-08-30 NOTE — TELEPHONE ENCOUNTER
[facility] at [facility] calling to speak with Skye re: pts heart rate.  States pt reported to them he had been talking with Skye re: his heart rate this morning.  Can reach her at #[phone]/kahm

## 2019-08-30 NOTE — TELEPHONE ENCOUNTER
Wife calling to report on pt today.  States pulse still elevated (126-132).  Pulse was elevated at rehab this am and pt had numbness & tingling in thumb, index finger and middle finger.  Can reach her at w# [phone] x [phone] or cell# [phone]/[facility]

## 2019-09-05 ENCOUNTER — TELEPHONE (OUTPATIENT)
Dept: CARDIOLOGY | Facility: CLINIC | Age: 59
End: 2019-09-05

## 2019-09-05 ENCOUNTER — HOSPITAL ENCOUNTER (OUTPATIENT)
Facility: HOSPITAL | Age: 59
Setting detail: OBSERVATION
Discharge: HOME | End: 2019-09-06
Attending: EMERGENCY MEDICINE | Admitting: INTERNAL MEDICINE

## 2019-09-05 ENCOUNTER — APPOINTMENT (OUTPATIENT)
Dept: GENERAL RADIOLOGY | Facility: HOSPITAL | Age: 59
End: 2019-09-05

## 2019-09-05 DIAGNOSIS — R07.9 CHEST PAIN, UNSPECIFIED TYPE: ICD-10-CM

## 2019-09-05 DIAGNOSIS — I48.92 ATRIAL FLUTTER, UNSPECIFIED TYPE (HCC): Primary | ICD-10-CM

## 2019-09-05 DIAGNOSIS — R06.00 DYSPNEA, UNSPECIFIED TYPE: ICD-10-CM

## 2019-09-05 LAB
ALBUMIN SERPL-MCNC: 4.1 G/DL (ref 3.5–5)
ALBUMIN/GLOB SERPL: 1.4 G/DL (ref 1.1–2.5)
ALP SERPL-CCNC: 87 U/L (ref 24–120)
ALT SERPL W P-5'-P-CCNC: 30 U/L (ref 0–54)
AMPHET+METHAMPHET UR QL: NEGATIVE
ANION GAP SERPL CALCULATED.3IONS-SCNC: 8 MMOL/L (ref 4–13)
APTT PPP: 32.6 SECONDS (ref 24.1–35)
AST SERPL-CCNC: 30 U/L (ref 7–45)
BARBITURATES UR QL SCN: NEGATIVE
BASOPHILS # BLD AUTO: 0.03 10*3/MM3 (ref 0–0.2)
BASOPHILS NFR BLD AUTO: 0.6 % (ref 0–1.5)
BENZODIAZ UR QL SCN: NEGATIVE
BILIRUB SERPL-MCNC: 0.5 MG/DL (ref 0.1–1)
BILIRUB UR QL STRIP: NEGATIVE
BUN BLD-MCNC: 17 MG/DL (ref 5–21)
BUN/CREAT SERPL: 10.8 (ref 7–25)
CALCIUM SPEC-SCNC: 9.6 MG/DL (ref 8.4–10.4)
CANNABINOIDS SERPL QL: NEGATIVE
CHLORIDE SERPL-SCNC: 110 MMOL/L (ref 98–110)
CK SERPL-CCNC: 103 U/L (ref 0–203)
CLARITY UR: CLEAR
CO2 SERPL-SCNC: 24 MMOL/L (ref 24–31)
COCAINE UR QL: NEGATIVE
COLOR UR: YELLOW
CREAT BLD-MCNC: 1.58 MG/DL (ref 0.5–1.4)
DEPRECATED RDW RBC AUTO: 42.6 FL (ref 37–54)
EOSINOPHIL # BLD AUTO: 0.17 10*3/MM3 (ref 0–0.4)
EOSINOPHIL NFR BLD AUTO: 3.2 % (ref 0.3–6.2)
ERYTHROCYTE [DISTWIDTH] IN BLOOD BY AUTOMATED COUNT: 13.6 % (ref 12.3–15.4)
GFR SERPL CREATININE-BSD FRML MDRD: 45 ML/MIN/1.73
GLOBULIN UR ELPH-MCNC: 3 GM/DL
GLUCOSE BLD-MCNC: 95 MG/DL (ref 70–100)
GLUCOSE UR STRIP-MCNC: ABNORMAL MG/DL
HCT VFR BLD AUTO: 37.3 % (ref 37.5–51)
HGB BLD-MCNC: 11.5 G/DL (ref 13–17.7)
HGB UR QL STRIP.AUTO: NEGATIVE
HOLD SPECIMEN: NORMAL
HOLD SPECIMEN: NORMAL
IMM GRANULOCYTES # BLD AUTO: 0.01 10*3/MM3 (ref 0–0.05)
IMM GRANULOCYTES NFR BLD AUTO: 0.2 % (ref 0–0.5)
INR PPP: 1.08 (ref 0.91–1.09)
KETONES UR QL STRIP: NEGATIVE
LEUKOCYTE ESTERASE UR QL STRIP.AUTO: NEGATIVE
LIPASE SERPL-CCNC: 55 U/L (ref 23–203)
LYMPHOCYTES # BLD AUTO: 1.65 10*3/MM3 (ref 0.7–3.1)
LYMPHOCYTES NFR BLD AUTO: 30.9 % (ref 19.6–45.3)
MAGNESIUM SERPL-MCNC: 1.9 MG/DL (ref 1.4–2.2)
MCH RBC QN AUTO: 26.6 PG (ref 26.6–33)
MCHC RBC AUTO-ENTMCNC: 30.8 G/DL (ref 31.5–35.7)
MCV RBC AUTO: 86.3 FL (ref 79–97)
METHADONE UR QL SCN: NEGATIVE
MONOCYTES # BLD AUTO: 0.41 10*3/MM3 (ref 0.1–0.9)
MONOCYTES NFR BLD AUTO: 7.7 % (ref 5–12)
MYOGLOBIN SERPL-MCNC: 34.4 NG/ML (ref 0–110)
NEUTROPHILS # BLD AUTO: 3.07 10*3/MM3 (ref 1.7–7)
NEUTROPHILS NFR BLD AUTO: 57.4 % (ref 42.7–76)
NITRITE UR QL STRIP: NEGATIVE
NRBC BLD AUTO-RTO: 0 /100 WBC (ref 0–0.2)
NT-PROBNP SERPL-MCNC: 1150 PG/ML (ref 0–900)
OPIATES UR QL: NEGATIVE
PCP UR QL SCN: NEGATIVE
PH UR STRIP.AUTO: 5.5 [PH] (ref 5–8)
PLATELET # BLD AUTO: 144 10*3/MM3 (ref 140–450)
PMV BLD AUTO: 11.4 FL (ref 6–12)
POTASSIUM BLD-SCNC: 4.6 MMOL/L (ref 3.5–5.3)
PROT SERPL-MCNC: 7.1 G/DL (ref 6.3–8.7)
PROT UR QL STRIP: NEGATIVE
PROTHROMBIN TIME: 14.4 SECONDS (ref 11.9–14.6)
RBC # BLD AUTO: 4.32 10*6/MM3 (ref 4.14–5.8)
SODIUM BLD-SCNC: 142 MMOL/L (ref 135–145)
SP GR UR STRIP: >=1.03 (ref 1–1.03)
T4 FREE SERPL-MCNC: 0.96 NG/DL (ref 0.78–2.19)
TROPONIN I SERPL-MCNC: 0.01 NG/ML (ref 0–0.03)
TROPONIN I SERPL-MCNC: <0.012 NG/ML (ref 0–0.03)
TROPONIN I SERPL-MCNC: <0.012 NG/ML (ref 0–0.03)
TSH SERPL DL<=0.05 MIU/L-ACNC: 1.81 UIU/ML (ref 0.47–4.68)
UROBILINOGEN UR QL STRIP: ABNORMAL
WBC NRBC COR # BLD: 5.34 10*3/MM3 (ref 3.4–10.8)
WHOLE BLOOD HOLD SPECIMEN: NORMAL
WHOLE BLOOD HOLD SPECIMEN: NORMAL

## 2019-09-05 PROCEDURE — 99220 PR INITIAL OBSERVATION CARE/DAY 70 MINUTES: CPT | Performed by: INTERNAL MEDICINE

## 2019-09-05 PROCEDURE — 80307 DRUG TEST PRSMV CHEM ANLYZR: CPT | Performed by: EMERGENCY MEDICINE

## 2019-09-05 PROCEDURE — G0378 HOSPITAL OBSERVATION PER HR: HCPCS

## 2019-09-05 PROCEDURE — 85025 COMPLETE CBC W/AUTO DIFF WBC: CPT | Performed by: EMERGENCY MEDICINE

## 2019-09-05 PROCEDURE — 85730 THROMBOPLASTIN TIME PARTIAL: CPT | Performed by: EMERGENCY MEDICINE

## 2019-09-05 PROCEDURE — 93005 ELECTROCARDIOGRAM TRACING: CPT | Performed by: EMERGENCY MEDICINE

## 2019-09-05 PROCEDURE — 99285 EMERGENCY DEPT VISIT HI MDM: CPT

## 2019-09-05 PROCEDURE — 80053 COMPREHEN METABOLIC PANEL: CPT | Performed by: EMERGENCY MEDICINE

## 2019-09-05 PROCEDURE — 36415 COLL VENOUS BLD VENIPUNCTURE: CPT | Performed by: NURSE PRACTITIONER

## 2019-09-05 PROCEDURE — 85610 PROTHROMBIN TIME: CPT | Performed by: EMERGENCY MEDICINE

## 2019-09-05 PROCEDURE — 25010000002 ONDANSETRON PER 1 MG: Performed by: EMERGENCY MEDICINE

## 2019-09-05 PROCEDURE — 83880 ASSAY OF NATRIURETIC PEPTIDE: CPT | Performed by: EMERGENCY MEDICINE

## 2019-09-05 PROCEDURE — 84484 ASSAY OF TROPONIN QUANT: CPT | Performed by: EMERGENCY MEDICINE

## 2019-09-05 PROCEDURE — 93010 ELECTROCARDIOGRAM REPORT: CPT | Performed by: INTERNAL MEDICINE

## 2019-09-05 PROCEDURE — 71045 X-RAY EXAM CHEST 1 VIEW: CPT

## 2019-09-05 PROCEDURE — 83690 ASSAY OF LIPASE: CPT | Performed by: EMERGENCY MEDICINE

## 2019-09-05 PROCEDURE — 96375 TX/PRO/DX INJ NEW DRUG ADDON: CPT

## 2019-09-05 PROCEDURE — 81003 URINALYSIS AUTO W/O SCOPE: CPT | Performed by: EMERGENCY MEDICINE

## 2019-09-05 PROCEDURE — 84439 ASSAY OF FREE THYROXINE: CPT | Performed by: EMERGENCY MEDICINE

## 2019-09-05 PROCEDURE — 82550 ASSAY OF CK (CPK): CPT | Performed by: EMERGENCY MEDICINE

## 2019-09-05 PROCEDURE — 83735 ASSAY OF MAGNESIUM: CPT | Performed by: EMERGENCY MEDICINE

## 2019-09-05 PROCEDURE — 84443 ASSAY THYROID STIM HORMONE: CPT | Performed by: EMERGENCY MEDICINE

## 2019-09-05 PROCEDURE — 84484 ASSAY OF TROPONIN QUANT: CPT | Performed by: NURSE PRACTITIONER

## 2019-09-05 PROCEDURE — 83874 ASSAY OF MYOGLOBIN: CPT | Performed by: EMERGENCY MEDICINE

## 2019-09-05 PROCEDURE — 96374 THER/PROPH/DIAG INJ IV PUSH: CPT

## 2019-09-05 RX ORDER — LEVETIRACETAM 500 MG/1
1500 TABLET ORAL DAILY
Status: DISCONTINUED | OUTPATIENT
Start: 2019-09-06 | End: 2019-09-06 | Stop reason: HOSPADM

## 2019-09-05 RX ORDER — ROSUVASTATIN CALCIUM 20 MG/1
20 TABLET, COATED ORAL NIGHTLY
Status: DISCONTINUED | OUTPATIENT
Start: 2019-09-05 | End: 2019-09-06 | Stop reason: HOSPADM

## 2019-09-05 RX ORDER — ONDANSETRON 4 MG/1
4 TABLET, ORALLY DISINTEGRATING ORAL EVERY 4 HOURS PRN
Status: DISCONTINUED | OUTPATIENT
Start: 2019-09-05 | End: 2019-09-06 | Stop reason: HOSPADM

## 2019-09-05 RX ORDER — LEVETIRACETAM 500 MG/1
2000 TABLET ORAL NIGHTLY
Status: DISCONTINUED | OUTPATIENT
Start: 2019-09-05 | End: 2019-09-06 | Stop reason: HOSPADM

## 2019-09-05 RX ORDER — PANTOPRAZOLE SODIUM 40 MG/1
40 TABLET, DELAYED RELEASE ORAL
Status: DISCONTINUED | OUTPATIENT
Start: 2019-09-05 | End: 2019-09-06 | Stop reason: HOSPADM

## 2019-09-05 RX ORDER — LAMOTRIGINE 100 MG/1
200 TABLET ORAL 2 TIMES DAILY
Status: DISCONTINUED | OUTPATIENT
Start: 2019-09-05 | End: 2019-09-06 | Stop reason: HOSPADM

## 2019-09-05 RX ORDER — METOPROLOL TARTRATE 50 MG/1
25 TABLET, FILM COATED ORAL 2 TIMES DAILY
COMMUNITY
End: 2019-11-14

## 2019-09-05 RX ORDER — SODIUM CHLORIDE 0.9 % (FLUSH) 0.9 %
10 SYRINGE (ML) INJECTION AS NEEDED
Status: DISCONTINUED | OUTPATIENT
Start: 2019-09-05 | End: 2019-09-06 | Stop reason: HOSPADM

## 2019-09-05 RX ORDER — NITROGLYCERIN 0.4 MG/1
0.4 TABLET SUBLINGUAL
Status: DISCONTINUED | OUTPATIENT
Start: 2019-09-05 | End: 2019-09-06 | Stop reason: HOSPADM

## 2019-09-05 RX ORDER — AMIODARONE HYDROCHLORIDE 200 MG/1
200 TABLET ORAL 2 TIMES DAILY
Status: DISCONTINUED | OUTPATIENT
Start: 2019-09-05 | End: 2019-09-06 | Stop reason: HOSPADM

## 2019-09-05 RX ORDER — DILTIAZEM HYDROCHLORIDE 5 MG/ML
20 INJECTION INTRAVENOUS ONCE
Status: COMPLETED | OUTPATIENT
Start: 2019-09-05 | End: 2019-09-05

## 2019-09-05 RX ORDER — SODIUM CHLORIDE 0.9 % (FLUSH) 0.9 %
10 SYRINGE (ML) INJECTION EVERY 12 HOURS SCHEDULED
Status: DISCONTINUED | OUTPATIENT
Start: 2019-09-05 | End: 2019-09-06 | Stop reason: HOSPADM

## 2019-09-05 RX ORDER — ASPIRIN 81 MG/1
81 TABLET, CHEWABLE ORAL DAILY
Status: DISCONTINUED | OUTPATIENT
Start: 2019-09-06 | End: 2019-09-06 | Stop reason: HOSPADM

## 2019-09-05 RX ORDER — CALCIUM POLYCARBOPHIL 625 MG 625 MG/1
625 TABLET ORAL AS NEEDED
COMMUNITY

## 2019-09-05 RX ORDER — ONDANSETRON 2 MG/ML
4 INJECTION INTRAMUSCULAR; INTRAVENOUS ONCE
Status: COMPLETED | OUTPATIENT
Start: 2019-09-05 | End: 2019-09-05

## 2019-09-05 RX ADMIN — AMIODARONE HYDROCHLORIDE 200 MG: 200 TABLET ORAL at 21:17

## 2019-09-05 RX ADMIN — DILTIAZEM HYDROCHLORIDE 20 MG: 5 INJECTION INTRAVENOUS at 13:48

## 2019-09-05 RX ADMIN — SODIUM CHLORIDE 1000 ML: 9 INJECTION, SOLUTION INTRAVENOUS at 12:06

## 2019-09-05 RX ADMIN — LAMOTRIGINE 200 MG: 100 TABLET ORAL at 21:17

## 2019-09-05 RX ADMIN — SODIUM CHLORIDE, PRESERVATIVE FREE 10 ML: 5 INJECTION INTRAVENOUS at 21:21

## 2019-09-05 RX ADMIN — ONDANSETRON HYDROCHLORIDE 4 MG: 2 SOLUTION INTRAMUSCULAR; INTRAVENOUS at 12:06

## 2019-09-05 RX ADMIN — APIXABAN 5 MG: 5 TABLET, FILM COATED ORAL at 21:17

## 2019-09-05 RX ADMIN — METOPROLOL TARTRATE 75 MG: 50 TABLET, FILM COATED ORAL at 21:17

## 2019-09-05 RX ADMIN — LEVETIRACETAM 2000 MG: 500 TABLET, FILM COATED ORAL at 21:17

## 2019-09-05 RX ADMIN — ROSUVASTATIN CALCIUM 20 MG: 20 TABLET, FILM COATED ORAL at 21:17

## 2019-09-05 NOTE — ED PROVIDER NOTES
Subjective   This is a 59-year-old male who presents to the emergency department via EMS for evaluation of multiple concerns.  Patient indicates that he has been having intermittent symptoms since his open heart surgery by Dr. Tang in July.  Patient describes intermittent left-sided chest pain and associated shortness of breath.  Patient describes that since his surgery he has gone in and out of atrial fibrillation.  Last week he contacted his cardiologist's office but Dr. Ramey is out of town.  He came to the emergency department last Friday and was started on Eliquis.  He indicates that he was told that he may need a CARLOS to rule out a clot in his heart.  Patient describes worsening of chest pain again today.  He again reports palpitation and associated dizziness.  He has had worsening of his shortness of breath.  No cough or wheezing.  No abdominal pain.  He was nauseous earlier today but he did not vomit.  No genitourinary or stool complaints.  He has had recent lower extremity edema.  No unilateral or focal sensory/motor deficits.  Review of systems otherwise negative.  He received nitroglycerin and aspirin prior to arrival.  He has no additional complaints.            Review of Systems   All other systems reviewed and are negative.      Past Medical History:   Diagnosis Date   • Arthritis    • Asthma    • Carotid disease, bilateral (CMS/HCC)    • Chest pain    • Chronic ischemic heart disease    • Chronic sinusitis    • Maribell bullosa    • Coronary artery disease    • Deviated septum    • Diabetes mellitus (CMS/HCC)    • Difficulty urinating    • Diverticulitis    • Enlarged prostate    • Fatty liver    • GERD (gastroesophageal reflux disease)    • Hyperlipidemia    • Hypertension    • Hypertrophy of nasal turbinates    • Keratoderma    • Kidney stone    • Migraine    • Murmur, heart    • Myocardial infarction (CMS/HCC)    • Obesity    • PONV (postoperative nausea and vomiting)    • Psoriasis    • Seizures  (CMS/HCC)    • Sinus congestion    • Sleep apnea    • SOB (shortness of breath)    • Stroke (CMS/HCC)    • UTI (urinary tract infection)        Allergies   Allergen Reactions   • Flagyl [Metronidazole] Hives   • Atorvastatin Other (See Comments)     Muscle cramps   • Ciprofloxacin Hives       Past Surgical History:   Procedure Laterality Date   • CARDIAC CATHETERIZATION  01/2016    Dr. Broadbent; widely patent previously placed stents in the left anterior descending and obstructive disease involving the diagonal branch which was treated medically   • CARDIAC CATHETERIZATION N/A 7/14/2017    Procedure: Left Heart Cath;  Surgeon: Wade Ramey MD;  Location:  [facility];  Service:    • CARDIAC CATHETERIZATION Left 10/15/2018    Procedure: Cardiac Catheterization/Vascular Study;  Surgeon: Wade Ramey MD;  Location:  [facility];  Service: Cardiology   • CARDIAC CATHETERIZATION  10/15/2018    Procedure: Functional Flow [place];  Surgeon: Wade Ramey MD;  Location:  [facility];  Service: Cardiology   • CARDIAC CATHETERIZATION N/A 10/15/2018    Procedure: Left ventriculography;  Surgeon: Wade Ramey MD;  Location:  [facility];  Service: Cardiology   • CARDIAC CATHETERIZATION Left 6/26/2019    Procedure: Cardiac Catheterization/Vascular Study VEL OK  HE WILL WAIT 1 YEAR FOR SHOULDER SURGERY ;  Surgeon: Wade Ramey MD;  Location:  [facility];  Service: Cardiology   • CHOLECYSTECTOMY     • CHOLECYSTECTOMY WITH INTRAOPERATIVE CHOLANGIOGRAM N/A 8/1/2018    Procedure: CHOLECYSTECTOMY LAPAROSCOPIC INTRAOPERATIVE CHOLANGIOGRAM;  Surgeon: Shane Ann MD;  Location:  [facility] OR;  Service: General   • CORONARY ANGIOPLASTY     • CORONARY ARTERY BYPASS GRAFT N/A 7/6/2019    Procedure: CABG X2 WITH LIMA, LEFT LEG OVH, AND PLACEMENT OF LEFT FEMORAL ARTERIAL LINE;  Surgeon: Steven Tang MD;  Location:  [facility] OR;  Service:  Cardiothoracic   • CORONARY STENT PLACEMENT      x 6   • ENDOSCOPIC FUNCTIONAL SINUS SURGERY (FESS) Bilateral 2017    Procedure: PROCEDURE PERFORMED:  Bilateral functional endoscopic anterior ethmoidectomy with bilateral middle meatal antrostomy Septoplasty Right kathia bullosa resection Bilateral inferior turbinate reduction via Coblation;  Surgeon: Mayank Ibarra MD;  Location: [facility] OR;  Service:    • ENDOSCOPY N/A 2018    Procedure: ESOPHAGOGASTRODUODENOSCOPY WITH ANESTHESIA;  Surgeon: Benitez Mas MD;  Location: [facility] ENDOSCOPY;  Service: Gastroenterology   • HERNIA REPAIR      x2 inguinal area   • KIDNEY STONE SURGERY     • KNEE SURGERY Right    • OTHER SURGICAL HISTORY      urolift   • PROSTATE SURGERY      Dr. Badillo -    • THUMB AMPUTATION Left     partial   • TOE AMPUTATION Right     big       Family History   Problem Relation Age of Onset   • Heart disease Father    • No Known Problems Mother        Social History     Socioeconomic History   • Marital status:      Spouse name: Not on file   • Number of children: Not on file   • Years of education: Not on file   • Highest education level: Not on file   Tobacco Use   • Smoking status: Former Smoker     Years: 4.50     Types: Cigarettes     Last attempt to quit:      Years since quittin.6   • Smokeless tobacco: Former User     Types: Chew     Quit date:    Substance and Sexual Activity   • Alcohol use: No   • Drug use: No   • Sexual activity: Defer           Objective   Physical Exam   Constitutional: He is oriented to person, place, and time. He appears well-developed and well-nourished.   HENT:   Head: Normocephalic and atraumatic.   Eyes: EOM are normal. Pupils are equal, round, and reactive to light.   Neck: Normal range of motion. Neck supple. No JVD present. No tracheal deviation present.   Cardiovascular: Regular rhythm and normal heart sounds.   Tachycardic   Pulmonary/Chest: Effort normal and breath  sounds normal. No stridor. No respiratory distress. He has no wheezes. He has no rales. He exhibits no tenderness.   Abdominal: Soft. Bowel sounds are normal. There is no tenderness. There is no guarding.   Musculoskeletal: He exhibits edema (1+ bilateral LE edema). He exhibits no deformity.   Neurological: He is alert and oriented to person, place, and time.   Skin: Skin is warm and dry. Capillary refill takes less than 2 seconds.   Psychiatric: He has a normal mood and affect. His behavior is normal.   Nursing note and vitals reviewed.      Procedures           ED Course      EKG at 1132 shows a narrow complex rhythm with concern for possible atrial flutter with 2-1 AV conduction.  Rate of 121 bpm.  Indeterminate axis.  Poor R wave progression.  Patient has nonspecific ST segment/T wave changes consistent with prior EKGs.  No acute elevation or evidence for acute infarction.    Patient declining anything for pain at this time    Labs reviewed    XR Chest 1 View   Final Result   1. Moderate cardiomegaly no evidence pulmonary vascular congestion.           This report was finalized on 09/05/2019 13:08 by Dr. Maco Akbar MD.        Remains tachycardic so a 20 mg bolus of Cardizem as ordered    After Cardizem his rate did transiently slow so an EKG was repeated    Repeat EKG shows persistent atrial flutter with a variable AV block  No evidence for acute infarction    Patient was updated on the results  Stable vital signs  He understands plan of care for a cardiology consult    Cardiology team has come to the emergency department  Case was discussed with Dr. Llamas  He will admit the patient for further care  Patient agreeable              MDM  Patient with known atrial arrhythmias  Patient appears to have been in atrial flutter, probably since he was here last week  His rate is not well controlled at this time  He did have some improvement of his chest pain with the nitroglycerin provided by EMS providers  His  troponin is negative  No suspicion for pulmonary embolism  We will hold on repeat CT scan  Patient was given a bolus of Cardizem which transiently improved his rate however he remains in atrial flutter  Case discussed with cardiology who will admit  Stable for floor      Impression: Chest pain, shortness of breath, atrial flutter  Plan: Admit         Sumanth Mccann, DO  09/05/19 [id number]

## 2019-09-05 NOTE — PLAN OF CARE
Problem: Patient Care Overview  Goal: Plan of Care Review  Outcome: Ongoing (interventions implemented as appropriate)   09/05/19 2030   Coping/Psychosocial   Plan of Care Reviewed With patient   Plan of Care Review   Progress no change   OTHER   Outcome Summary pt admit from ER with A flutter. HR in 130-140. HR now 120. possible CARLOS and cardioversion in am. cont to monitor.      Goal: Individualization and Mutuality  Outcome: Ongoing (interventions implemented as appropriate)    Goal: Discharge Needs Assessment  Outcome: Ongoing (interventions implemented as appropriate)    Goal: Interprofessional Rounds/Family Conf  Outcome: Ongoing (interventions implemented as appropriate)      Problem: Arrhythmia/Dysrhythmia (Symptomatic) (Adult)  Goal: Signs and Symptoms of Listed Potential Problems Will be Absent, Minimized or Managed (Arrhythmia/Dysrhythmia)  Outcome: Ongoing (interventions implemented as appropriate)

## 2019-09-05 NOTE — TELEPHONE ENCOUNTER
----- Message from Laurel Morrell sent at 9/4/2019 10:14 AM CDT -----  Contact: [phone]  Patient stated that cardio rehab wants to know if he needs to continue rehab (because of the fluttering) until he sees Dr. Ramey.  Please call patient @ above #

## 2019-09-05 NOTE — H&P
[facility] -  History and Physical     LOS: 0 days   Patient Care Team:  Vinayak Correia PA as PCP - General (Physician Assistant)  Vinayak Correia PA as PCP - Family Medicine  Wade Ramey MD as PCP - Claims Attributed  Wade Ramey MD as Cardiologist (Cardiology)  Sumanth Badillo MD as Consulting Physician (Urology)  Cyril Blake MD as Referring Physician (Otolaryngology)  Mayank Ibarra MD as Consulting Physician (Otolaryngology)  Hamilton White MD as Consulting Physician (Neurology)    Chief Complaint:atrial flutter    Subjective     Interval History:     Patient Complaints: Carlos A Boyer Jr. is a 59 y.o. male with known PMH significant for CAD, CABG, HTN, HLD, DM and had short term post op AF and atrial flutter. He was last seen in the ER on 8/30 with complaints of left arm numbness. He was also told 2 days prior to his ER visit, by his podiatrist that he was tachycardic. He was placed on Eliquis 5mg BID and continue ASA. He was told to follow up with Dr. Ramey in 2 weeks to plan an outpatient cardioversion. He presented to the ER today after having an episode of nausea with vomiting with following chest pain. He states he did have associated shortness of breath. He states the chest pain and shortness of breath was relieved with Nitro. He states he has not had any previous episodes since last Friday until today. He has not missed any doses of his Eliquis or Amio. He has not been to cardiac rehab since last Friday. He states his HR has been 120-150 at home and he has been asymptomatic. He states he has not noticed any swelling but also has not weighed himself in a few days. On arrival to the ER he was noted to be in Atrial flutter with RVR -150. He received a 20mg bolus of Cardizem and HR improved briefly. Troponins thus far are negative. BNP in elevated at 1,150. Creat 1.58. All other labs are unremarkable. CXR today shows cardiomegaly with no  pulmonary vascular congestion.     Review of Systems   Constitutional: Negative for chills, diaphoresis, fatigue and unexpected weight change.   HENT: Negative for nosebleeds.    Respiratory: Negative for cough, chest tightness, shortness of breath and wheezing.    Cardiovascular: Negative for chest pain, palpitations and leg swelling.   Gastrointestinal: Negative for anal bleeding, blood in stool, diarrhea and nausea.   Neurological: Negative for dizziness, syncope and light-headedness.       History  Past Medical History:   Diagnosis Date   • Arthritis    • Asthma    • Carotid disease, bilateral (CMS/HCC)    • Chest pain    • Chronic ischemic heart disease    • Chronic sinusitis    • Maribell bullosa    • Coronary artery disease    • Deviated septum    • Diabetes mellitus (CMS/HCC)    • Difficulty urinating    • Diverticulitis    • Enlarged prostate    • Fatty liver    • GERD (gastroesophageal reflux disease)    • Hyperlipidemia    • Hypertension    • Hypertrophy of nasal turbinates    • Keratoderma    • Kidney stone    • Migraine    • Murmur, heart    • Myocardial infarction (CMS/HCC)    • Obesity    • PONV (postoperative nausea and vomiting)    • Psoriasis    • Seizures (CMS/HCC)    • Sinus congestion    • Sleep apnea    • SOB (shortness of breath)    • Stroke (CMS/HCC)    • UTI (urinary tract infection)      Past Surgical History:   Procedure Laterality Date   • CARDIAC CATHETERIZATION  01/2016    Dr. Broadbent; widely patent previously placed stents in the left anterior descending and obstructive disease involving the diagonal branch which was treated medically   • CARDIAC CATHETERIZATION N/A 7/14/2017    Procedure: Left Heart Cath;  Surgeon: Wade Ramey MD;  Location:  [facility] CATH INVASIVE LOCATION;  Service:    • CARDIAC CATHETERIZATION Left 10/15/2018    Procedure: Cardiac Catheterization/Vascular Study;  Surgeon: Wade Ramey MD;  Location:  [facility] CATH INVASIVE LOCATION;  Service: Cardiology   • CARDIAC  CATHETERIZATION  10/15/2018    Procedure: Functional Flow [place];  Surgeon: Wade Ramey MD;  Location: [facility] CATH INVASIVE LOCATION;  Service: Cardiology   • CARDIAC CATHETERIZATION N/A 10/15/2018    Procedure: Left ventriculography;  Surgeon: Wade Ramey MD;  Location:  [facility] CATH INVASIVE LOCATION;  Service: Cardiology   • CARDIAC CATHETERIZATION Left 6/26/2019    Procedure: Cardiac Catheterization/Vascular Study VEL OK  HE WILL WAIT 1 YEAR FOR SHOULDER SURGERY ;  Surgeon: Wade Ramey MD;  Location: [facility] CATH INVASIVE LOCATION;  Service: Cardiology   • CHOLECYSTECTOMY     • CHOLECYSTECTOMY WITH INTRAOPERATIVE CHOLANGIOGRAM N/A 8/1/2018    Procedure: CHOLECYSTECTOMY LAPAROSCOPIC INTRAOPERATIVE CHOLANGIOGRAM;  Surgeon: Shane Ann MD;  Location: [facility] OR;  Service: General   • CORONARY ANGIOPLASTY     • CORONARY ARTERY BYPASS GRAFT N/A 7/6/2019    Procedure: CABG X2 WITH LIMA, LEFT LEG OVH, AND PLACEMENT OF LEFT FEMORAL ARTERIAL LINE;  Surgeon: Steven Tang MD;  Location: [facility] OR;  Service: Cardiothoracic   • CORONARY STENT PLACEMENT      x 6   • ENDOSCOPIC FUNCTIONAL SINUS SURGERY (FESS) Bilateral 12/13/2017    Procedure: PROCEDURE PERFORMED:  Bilateral functional endoscopic anterior ethmoidectomy with bilateral middle meatal antrostomy Septoplasty Right kathia bullosa resection Bilateral inferior turbinate reduction via Coblation;  Surgeon: Mayank Ibarra MD;  Location: [facility] OR;  Service:    • ENDOSCOPY N/A 7/30/2018    Procedure: ESOPHAGOGASTRODUODENOSCOPY WITH ANESTHESIA;  Surgeon: Benitez Mas MD;  Location: [facility] ENDOSCOPY;  Service: Gastroenterology   • HERNIA REPAIR      x2 inguinal area   • KIDNEY STONE SURGERY     • KNEE SURGERY Right    • OTHER SURGICAL HISTORY      urolift   • PROSTATE SURGERY      Dr. Badillo - 2017   • THUMB AMPUTATION Left     partial   • TOE AMPUTATION Right     big     Family History   Problem Relation Age of Onset   • Heart disease Father    • No  Known Problems Mother      Social History     Tobacco Use   • Smoking status: Former Smoker     Years: 4.50     Types: Cigarettes     Last attempt to quit:      Years since quittin.6   • Smokeless tobacco: Former User     Types: Chew     Quit date:    Substance Use Topics   • Alcohol use: No   • Drug use: No       (Not in a hospital admission)  Allergies:  Flagyl [metronidazole]; Atorvastatin; and Ciprofloxacin    Objective     Vital Sign Min/Max for last 24 hours  Temp  Min: 98.3 °F (36.8 °C)  Max: 98.3 °F (36.8 °C)   BP  Min: 123/81  Max: 155/89   Pulse  Min: 86  Max: 124   Resp  Min: 14  Max: 17   SpO2  Min: 94 %  Max: 98 %   No Data Recorded   Weight  Min: 115 kg (252 lb 14.4 oz)  Max: 115 kg (252 lb 14.4 oz)         19  1134   Weight: 115 kg (252 lb 14.4 oz)       Physical Exam:    Physical Exam   Constitutional: He is oriented to person, place, and time. He appears well-developed and well-nourished. No distress.   HENT:   Head: Normocephalic.   Mouth/Throat: No oropharyngeal exudate.   Eyes: Conjunctivae and EOM are normal. Pupils are equal, round, and reactive to light. No scleral icterus.   Neck: Normal range of motion. Neck supple.   Cardiovascular: Normal heart sounds and intact distal pulses. An irregular rhythm present. Tachycardia present.   No murmur heard.  Pulmonary/Chest: Effort normal and breath sounds normal. No respiratory distress. He has no wheezes. He has no rales. He exhibits no tenderness.   Abdominal: Soft. Bowel sounds are normal. He exhibits no distension. There is no tenderness.   Musculoskeletal: Normal range of motion. He exhibits no edema.   Neurological: He is alert and oriented to person, place, and time. He has normal reflexes.   Skin: Skin is warm and dry. No rash noted. He is not diaphoretic. No erythema. No pallor.   Psychiatric: He has a normal mood and affect. His behavior is normal.   Vitals reviewed.    Results Review:   Lab Results (last 72 hours)      Procedure Component Value Units Date/Time    Troponin [[id number]]  (Normal) Collected:  09/05/19 1457    Specimen:  Blood Updated:  09/05/19 1526     Troponin I 0.014 ng/mL     Urine Drug Screen - Urine, Clean Catch [[id number]]  (Normal) Collected:  09/05/19 1216    Specimen:  Urine, Clean Catch Updated:  09/05/19 1248     Amphetamine Screen, Urine Negative     Barbiturates Screen, Urine Negative     Benzodiazepine Screen, Urine Negative     Cocaine Screen, Urine Negative     Methadone Screen, Urine Negative     Opiate Screen Negative     Phencyclidine (PCP), Urine Negative     THC, Screen, Urine Negative    Narrative:       Negative Thresholds For Drugs Screened in Urine:    Amphetamines          500 ng/ml  Barbiturates          200 ng/ml  Benzodiazepines       200 ng/ml  Cocaine               150 ng/ml  Methadone             150 ng/ml  Opiates               300 ng/ml  Phencyclidine         25 ng/ml  THC                      50 ng/ml    The normal value for all drugs tested is negative. This report includes final unconfirmed screening results.  A positive result by this assay can be, at your request, sent to the Reference Lab for confirmation by gas chromatography. Unconfirmed results must not be used for non-medical purposes, such as employment or legal testing. Clinical consideration should be applied to any drug of abuse test result, particularly when unconfirmed results are used.    TSH [[id number]]  (Normal) Collected:  09/05/19 1141    Specimen:  Blood Updated:  09/05/19 1248     TSH 1.810 uIU/mL     [place] Draw [[id number]] Collected:  09/05/19 1141    Specimen:  Blood Updated:  09/05/19 1246    Narrative:       The following orders were created for panel order [place] Draw.  Procedure                               Abnormality         Status                     ---------                               -----------         ------                     Light Blue Top[[id number]]                                    Final result               Green Top (Gel)[[id number]]                                  Final result               Lavender Top[[id number]]                                     Final result               Red Top[[id number]]                                          Final result                 Please view results for these tests on the individual orders.    Green Top (Gel) [[id number]] Collected:  09/05/19 1141    Specimen:  Blood Updated:  09/05/19 1246     Extra Tube Hold for add-ons.     Comment: Auto resulted.       Light Blue Top [[id number]] Collected:  09/05/19 1141    Specimen:  Blood Updated:  09/05/19 1246     Extra Tube hold for add-on     Comment: Auto resulted       Lavender Top [[id number]] Collected:  09/05/19 1141    Specimen:  Blood Updated:  09/05/19 1246     Extra Tube hold for add-on     Comment: Auto resulted       Red Top [[id number]] Collected:  09/05/19 1141    Specimen:  Blood Updated:  09/05/19 1246     Extra Tube Hold for add-ons.     Comment: Auto resulted.       T4, Free [[id number]]  (Normal) Collected:  09/05/19 1141    Specimen:  Blood Updated:  09/05/19 1233     Free T4 0.96 ng/dL     Troponin [[id number]]  (Normal) Collected:  09/05/19 1141    Specimen:  Blood Updated:  09/05/19 1226     Troponin I <0.012 ng/mL     BNP [[id number]]  (Abnormal) Collected:  09/05/19 1141    Specimen:  Blood Updated:  09/05/19 1226     proBNP 1,150.0 pg/mL     Myoglobin, Serum [[id number]]  (Normal) Collected:  09/05/19 1141    Specimen:  Blood Updated:  09/05/19 1226     Myoglobin 34.4 ng/mL     Urinalysis With Culture If Indicated - Urine, Clean Catch [[id number]]  (Abnormal) Collected:  09/05/19 1216    Specimen:  Urine, Clean Catch Updated:  09/05/19 1224     Color, UA Yellow     Appearance, UA Clear     pH, UA 5.5     Specific Gravity, UA >=1.030     Glucose, UA >=1000 mg/dL (3+)     Ketones, UA Negative     Bilirubin, UA Negative     Blood, UA Negative     Protein, UA Negative     Leuk Esterase, UA Negative      Nitrite, UA Negative     Urobilinogen, UA 1.0 E.U./dL    Narrative:       Urine microscopic not indicated.    Comprehensive Metabolic Panel [[id number]]  (Abnormal) Collected:  09/05/19 1141    Specimen:  Blood Updated:  09/05/19 1218     Glucose 95 mg/dL      BUN 17 mg/dL      Creatinine 1.58 mg/dL      Sodium 142 mmol/L      Potassium 4.6 mmol/L      Chloride 110 mmol/L      CO2 24.0 mmol/L      Calcium 9.6 mg/dL      Total Protein 7.1 g/dL      Albumin 4.10 g/dL      ALT (SGPT) 30 U/L      AST (SGOT) 30 U/L      Alkaline Phosphatase 87 U/L      Total Bilirubin 0.5 mg/dL      eGFR Non African Amer 45 mL/min/1.73      Globulin 3.0 gm/dL      A/G Ratio 1.4 g/dL      BUN/Creatinine Ratio 10.8     Anion Gap 8.0 mmol/L     Narrative:       GFR Normal >60  Chronic Kidney Disease <60  Kidney Failure <15    Lipase [[id number]]  (Normal) Collected:  09/05/19 1141    Specimen:  Blood Updated:  09/05/19 1214     Lipase 55 U/L     Magnesium [[id number]]  (Normal) Collected:  09/05/19 1141    Specimen:  Blood Updated:  09/05/19 1214     Magnesium 1.9 mg/dL     CK [[id number]]  (Normal) Collected:  09/05/19 1141    Specimen:  Blood Updated:  09/05/19 1214     Creatine Kinase 103 U/L     Protime-INR [[id number]]  (Normal) Collected:  09/05/19 1141    Specimen:  Blood Updated:  09/05/19 1209     Protime 14.4 Seconds      INR 1.08    aPTT [[id number]]  (Normal) Collected:  09/05/19 1141    Specimen:  Blood Updated:  09/05/19 1209     PTT 32.6 seconds     CBC & Differential [[id number]] Collected:  09/05/19 1141    Specimen:  Blood Updated:  09/05/19 1203    Narrative:       The following orders were created for panel order CBC & Differential.  Procedure                               Abnormality         Status                     ---------                               -----------         ------                     CBC Auto Differential[[id number]]        Abnormal            Final result                 Please view results for these tests  on the individual orders.    CBC Auto Differential [[id number]]  (Abnormal) Collected:  09/05/19 1141    Specimen:  Blood Updated:  09/05/19 1203     WBC 5.34 10*3/mm3      RBC 4.32 10*6/mm3      Hemoglobin 11.5 g/dL      Hematocrit 37.3 %      MCV 86.3 fL      MCH 26.6 pg      MCHC 30.8 g/dL      RDW 13.6 %      RDW-SD 42.6 fl      MPV 11.4 fL      Platelets 144 10*3/mm3      Neutrophil % 57.4 %      Lymphocyte % 30.9 %      Monocyte % 7.7 %      Eosinophil % 3.2 %      Basophil % 0.6 %      Immature Grans % 0.2 %      Neutrophils, Absolute 3.07 10*3/mm3      Lymphocytes, Absolute 1.65 10*3/mm3      Monocytes, Absolute 0.41 10*3/mm3      Eosinophils, Absolute 0.17 10*3/mm3      Basophils, Absolute 0.03 10*3/mm3      Immature Grans, Absolute 0.01 10*3/mm3      nRBC 0.0 /100 WBC            Echo EF Estimated  Lab Results   Component Value Date    ECHOEFEST 60 07/07/2019       Medication Review: yes  Current Facility-Administered Medications   Medication Dose Route Frequency Provider Last Rate Last Dose   • sodium chloride 0.9 % flush 10 mL  10 mL Intravenous PRN Sumanth Mccann,        • sodium chloride 0.9 % flush 10 mL  10 mL Intravenous Q12H Azucena Acosta APRN       • sodium chloride 0.9 % flush 10 mL  10 mL Intravenous PRN Azucena Acosta APRN         Current Outpatient Medications   Medication Sig Dispense Refill   • albuterol (PROVENTIL HFA;VENTOLIN HFA) 108 (90 BASE) MCG/ACT inhaler Inhale 2 puffs Every 6 (Six) Hours As Needed for wheezing.     • amiodarone (PACERONE) 400 MG tablet Take 1 tablet by mouth 2 (Two) Times a Day. (Patient taking differently: Take 200 mg by mouth 2 (Two) Times a Day.) 60 tablet 0   • apixaban (ELIQUIS) 5 MG tablet tablet Take 1 tablet by mouth Every 12 (Twelve) Hours. 60 tablet 5   • aspirin 81 MG chewable tablet Chew 81 mg Daily.     • carboxymethylcellulose (REFRESH PLUS) 0.5 % solution Administer 1 drop to both eyes 4 (Four) Times a Day As Needed for Dry Eyes.     •  Empagliflozin (JARDIANCE) 10 MG tablet Take 1 tablet by mouth Daily.     • fluticasone (FLONASE) 50 MCG/ACT nasal spray 2 sprays into each nostril Daily.     • gabapentin (NEURONTIN) 100 MG capsule Take 1 capsule by mouth Every Night. 30 capsule 2   • insulin aspart (novoLOG FLEXPEN) 100 UNIT/ML solution pen-injector sc pen Inject 30 Units under the skin into the appropriate area as directed Every Morning. 34 units  at breakfast, 30 units at lunch, 34 units hs     • Insulin Glargine (LANTUS SOLOSTAR) 100 UNIT/ML injection pen Inject 60-90 Units under the skin into the appropriate area as directed 2 (Two) Times a Day. 90 units @ night  60 units in morning     • lamoTRIgine (LaMICtal) 200 MG tablet Take 1 tablet by mouth 2 (Two) Times a Day. 180 tablet 1   • levETIRAcetam (KEPPRA) 500 MG tablet Take 3 tablets every morning, take 4 tablets every night. 630 tablet 1   • Liraglutide (VICTOZA SC) Inject 1.2 mg under the skin into the appropriate area as directed Every Night. 1.2     • metFORMIN (GLUCOPHAGE) 1000 MG tablet Take 1 tablet by mouth 2 (Two) Times a Day With Meals. HOLD x 48 hours post contrast. May resume 3/18/18     • metoprolol tartrate 75 MG tablet Take 75 mg by mouth 2 (Two) Times a Day. 60 tablet 2   • Multiple Vitamin (MULTI VITAMIN PO) Take 1 tablet by mouth Daily.     • nitroglycerin (NITROSTAT) 0.4 MG SL tablet Place 0.4 mg under the tongue Every 5 (Five) Minutes As Needed for chest pain. Take no more than 3 doses in 15 minutes.     • ondansetron ODT (ZOFRAN-ODT) 4 MG disintegrating tablet Take 1 tablet by mouth Every 4 (Four) Hours As Needed for Nausea or Vomiting. 10 tablet 0   • oxyCODONE-acetaminophen (PERCOCET) 5-325 MG per tablet Take 1 tablet by mouth Every 6 (Six) Hours As Needed (pain). 30 tablet 0   • pantoprazole (PROTONIX) 40 MG EC tablet Take 40 mg by mouth 2 (Two) Times a Day.     • psyllium (METAMUCIL) 58.6 % packet Take 1 packet by mouth Daily As Needed (constipation).     •  rosuvastatin (CRESTOR) 40 MG tablet Take 20 mg by mouth Every Night.       Assessment/Plan       Atrial flutter (CMS/HCC)    Plan:  -admit to 4B for observation.  -will rule out ischemia. Trend troponins. PRN EKG for chest pain. If negative will plan on CARLOS/cardioversion in am (around 930).   -resume home medications. Will hold diabetic medications and start on home dose of humalog and levemir.   -NPO after midnight for possible CARLOS/cardioversion  -aware that HR is 120-150. If patient is symptomatic, please notify Dr. Llamas.   -monitor telemetry. Daily weight. BMP and CBC in am.     Further recommendations per Dr. Dr. Llamas    Please note this cardiology history and physical note is the result of a face to face consultation with the patient, in addition to reviewing medical records at length by myself, TEENA Goncalves.     Time: approximately 45 minutes    TEENA Barahona  09/05/19  4:09 PM

## 2019-09-06 ENCOUNTER — NURSE TRIAGE (OUTPATIENT)
Dept: CALL CENTER | Facility: HOSPITAL | Age: 59
End: 2019-09-06

## 2019-09-06 ENCOUNTER — APPOINTMENT (OUTPATIENT)
Dept: CARDIOLOGY | Facility: HOSPITAL | Age: 59
End: 2019-09-06

## 2019-09-06 ENCOUNTER — ANESTHESIA (OUTPATIENT)
Dept: CARDIOLOGY | Facility: HOSPITAL | Age: 59
End: 2019-09-06

## 2019-09-06 ENCOUNTER — APPOINTMENT (OUTPATIENT)
Dept: GENERAL RADIOLOGY | Facility: HOSPITAL | Age: 59
End: 2019-09-06

## 2019-09-06 ENCOUNTER — ANESTHESIA EVENT (OUTPATIENT)
Dept: CARDIOLOGY | Facility: HOSPITAL | Age: 59
End: 2019-09-06

## 2019-09-06 ENCOUNTER — HOSPITAL ENCOUNTER (INPATIENT)
Facility: HOSPITAL | Age: 59
LOS: 1 days | Discharge: HOME | End: 2019-09-08
Attending: EMERGENCY MEDICINE | Admitting: INTERNAL MEDICINE

## 2019-09-06 VITALS
OXYGEN SATURATION: 95 % | WEIGHT: 245 LBS | SYSTOLIC BLOOD PRESSURE: 114 MMHG | BODY MASS INDEX: 33.18 KG/M2 | HEART RATE: 62 BPM | HEIGHT: 72 IN | TEMPERATURE: 98.4 F | DIASTOLIC BLOOD PRESSURE: 75 MMHG | RESPIRATION RATE: 20 BRPM

## 2019-09-06 DIAGNOSIS — R09.02 HYPOXIA: ICD-10-CM

## 2019-09-06 DIAGNOSIS — I50.9 ACUTE CONGESTIVE HEART FAILURE, UNSPECIFIED HEART FAILURE TYPE (HCC): Primary | ICD-10-CM

## 2019-09-06 DIAGNOSIS — I48.92 ATRIAL FLUTTER, UNSPECIFIED TYPE (HCC): ICD-10-CM

## 2019-09-06 LAB
ANION GAP SERPL CALCULATED.3IONS-SCNC: 7 MMOL/L (ref 4–13)
BASOPHILS # BLD AUTO: 0.04 10*3/MM3 (ref 0–0.2)
BASOPHILS NFR BLD AUTO: 0.4 % (ref 0–1.5)
BUN BLD-MCNC: 16 MG/DL (ref 5–21)
BUN/CREAT SERPL: 14.8 (ref 7–25)
CALCIUM SPEC-SCNC: 9.2 MG/DL (ref 8.4–10.4)
CHLORIDE SERPL-SCNC: 109 MMOL/L (ref 98–110)
CO2 SERPL-SCNC: 25 MMOL/L (ref 24–31)
CREAT BLD-MCNC: 1.08 MG/DL (ref 0.5–1.4)
DEPRECATED RDW RBC AUTO: 42.4 FL (ref 37–54)
DEPRECATED RDW RBC AUTO: 43.4 FL (ref 37–54)
EOSINOPHIL # BLD AUTO: 0.18 10*3/MM3 (ref 0–0.4)
EOSINOPHIL NFR BLD AUTO: 1.7 % (ref 0.3–6.2)
ERYTHROCYTE [DISTWIDTH] IN BLOOD BY AUTOMATED COUNT: 13.7 % (ref 12.3–15.4)
ERYTHROCYTE [DISTWIDTH] IN BLOOD BY AUTOMATED COUNT: 13.8 % (ref 12.3–15.4)
GFR SERPL CREATININE-BSD FRML MDRD: 70 ML/MIN/1.73
GLUCOSE BLD-MCNC: 67 MG/DL (ref 70–100)
GLUCOSE BLDC GLUCOMTR-MCNC: 162 MG/DL (ref 70–130)
GLUCOSE BLDC GLUCOMTR-MCNC: 84 MG/DL (ref 70–130)
HCT VFR BLD AUTO: 36.6 % (ref 37.5–51)
HCT VFR BLD AUTO: 42.4 % (ref 37.5–51)
HGB BLD-MCNC: 11.4 G/DL (ref 13–17.7)
HGB BLD-MCNC: 13.2 G/DL (ref 13–17.7)
HOLD SPECIMEN: NORMAL
HOLD SPECIMEN: NORMAL
IMM GRANULOCYTES # BLD AUTO: 0.03 10*3/MM3 (ref 0–0.05)
IMM GRANULOCYTES NFR BLD AUTO: 0.3 % (ref 0–0.5)
LYMPHOCYTES # BLD AUTO: 2.35 10*3/MM3 (ref 0.7–3.1)
LYMPHOCYTES NFR BLD AUTO: 21.7 % (ref 19.6–45.3)
MCH RBC QN AUTO: 26.7 PG (ref 26.6–33)
MCH RBC QN AUTO: 26.8 PG (ref 26.6–33)
MCHC RBC AUTO-ENTMCNC: 31.1 G/DL (ref 31.5–35.7)
MCHC RBC AUTO-ENTMCNC: 31.1 G/DL (ref 31.5–35.7)
MCV RBC AUTO: 85.7 FL (ref 79–97)
MCV RBC AUTO: 86.2 FL (ref 79–97)
MONOCYTES # BLD AUTO: 0.68 10*3/MM3 (ref 0.1–0.9)
MONOCYTES NFR BLD AUTO: 6.3 % (ref 5–12)
NEUTROPHILS # BLD AUTO: 7.54 10*3/MM3 (ref 1.7–7)
NEUTROPHILS NFR BLD AUTO: 69.6 % (ref 42.7–76)
NRBC BLD AUTO-RTO: 0 /100 WBC (ref 0–0.2)
PLATELET # BLD AUTO: 133 10*3/MM3 (ref 140–450)
PLATELET # BLD AUTO: 209 10*3/MM3 (ref 140–450)
PMV BLD AUTO: 11.9 FL (ref 6–12)
PMV BLD AUTO: 11.9 FL (ref 6–12)
POTASSIUM BLD-SCNC: 4.3 MMOL/L (ref 3.5–5.3)
RBC # BLD AUTO: 4.27 10*6/MM3 (ref 4.14–5.8)
RBC # BLD AUTO: 4.92 10*6/MM3 (ref 4.14–5.8)
SODIUM BLD-SCNC: 141 MMOL/L (ref 135–145)
TROPONIN I SERPL-MCNC: 0.02 NG/ML (ref 0–0.03)
WBC NRBC COR # BLD: 10.82 10*3/MM3 (ref 3.4–10.8)
WBC NRBC COR # BLD: 5.65 10*3/MM3 (ref 3.4–10.8)
WHOLE BLOOD HOLD SPECIMEN: NORMAL
WHOLE BLOOD HOLD SPECIMEN: NORMAL

## 2019-09-06 PROCEDURE — 71045 X-RAY EXAM CHEST 1 VIEW: CPT

## 2019-09-06 PROCEDURE — 93010 ELECTROCARDIOGRAM REPORT: CPT | Performed by: INTERNAL MEDICINE

## 2019-09-06 PROCEDURE — 82962 GLUCOSE BLOOD TEST: CPT

## 2019-09-06 PROCEDURE — 92960 CARDIOVERSION ELECTRIC EXT: CPT

## 2019-09-06 PROCEDURE — 85379 FIBRIN DEGRADATION QUANT: CPT | Performed by: EMERGENCY MEDICINE

## 2019-09-06 PROCEDURE — 93314 ECHO TRANSESOPHAGEAL: CPT | Performed by: INTERNAL MEDICINE

## 2019-09-06 PROCEDURE — 93325 DOPPLER ECHO COLOR FLOW MAPG: CPT | Performed by: INTERNAL MEDICINE

## 2019-09-06 PROCEDURE — 85027 COMPLETE CBC AUTOMATED: CPT | Performed by: NURSE PRACTITIONER

## 2019-09-06 PROCEDURE — 93320 DOPPLER ECHO COMPLETE: CPT

## 2019-09-06 PROCEDURE — 84484 ASSAY OF TROPONIN QUANT: CPT | Performed by: EMERGENCY MEDICINE

## 2019-09-06 PROCEDURE — G0378 HOSPITAL OBSERVATION PER HR: HCPCS

## 2019-09-06 PROCEDURE — 25010000002 PROPOFOL 10 MG/ML EMULSION: Performed by: NURSE ANESTHETIST, CERTIFIED REGISTERED

## 2019-09-06 PROCEDURE — 80048 BASIC METABOLIC PNL TOTAL CA: CPT | Performed by: NURSE PRACTITIONER

## 2019-09-06 PROCEDURE — 93005 ELECTROCARDIOGRAM TRACING: CPT | Performed by: INTERNAL MEDICINE

## 2019-09-06 PROCEDURE — 93325 DOPPLER ECHO COLOR FLOW MAPG: CPT

## 2019-09-06 PROCEDURE — 92960 CARDIOVERSION ELECTRIC EXT: CPT | Performed by: INTERNAL MEDICINE

## 2019-09-06 PROCEDURE — 85610 PROTHROMBIN TIME: CPT | Performed by: EMERGENCY MEDICINE

## 2019-09-06 PROCEDURE — 93005 ELECTROCARDIOGRAM TRACING: CPT | Performed by: EMERGENCY MEDICINE

## 2019-09-06 PROCEDURE — 85025 COMPLETE CBC W/AUTO DIFF WBC: CPT | Performed by: EMERGENCY MEDICINE

## 2019-09-06 PROCEDURE — 93312 ECHO TRANSESOPHAGEAL: CPT

## 2019-09-06 PROCEDURE — 99285 EMERGENCY DEPT VISIT HI MDM: CPT

## 2019-09-06 PROCEDURE — 85730 THROMBOPLASTIN TIME PARTIAL: CPT | Performed by: EMERGENCY MEDICINE

## 2019-09-06 PROCEDURE — 99217 PR OBSERVATION CARE DISCHARGE MANAGEMENT: CPT | Performed by: INTERNAL MEDICINE

## 2019-09-06 RX ORDER — ASPIRIN 81 MG/1
324 TABLET, CHEWABLE ORAL ONCE
Status: COMPLETED | OUTPATIENT
Start: 2019-09-06 | End: 2019-09-06

## 2019-09-06 RX ORDER — AMIODARONE HYDROCHLORIDE 200 MG/1
200 TABLET ORAL 2 TIMES DAILY
Qty: 60 TABLET | Refills: 11
Start: 2019-09-06 | End: 2019-09-08 | Stop reason: HOSPADM

## 2019-09-06 RX ORDER — SODIUM CHLORIDE 9 MG/ML
INJECTION, SOLUTION INTRAVENOUS CONTINUOUS PRN
Status: DISCONTINUED | OUTPATIENT
Start: 2019-09-06 | End: 2019-09-06 | Stop reason: SURG

## 2019-09-06 RX ORDER — PROPOFOL 10 MG/ML
VIAL (ML) INTRAVENOUS AS NEEDED
Status: DISCONTINUED | OUTPATIENT
Start: 2019-09-06 | End: 2019-09-06 | Stop reason: SURG

## 2019-09-06 RX ORDER — SODIUM CHLORIDE 0.9 % (FLUSH) 0.9 %
10 SYRINGE (ML) INJECTION AS NEEDED
Status: DISCONTINUED | OUTPATIENT
Start: 2019-09-06 | End: 2019-09-07

## 2019-09-06 RX ORDER — LIDOCAINE HYDROCHLORIDE 20 MG/ML
INJECTION, SOLUTION INFILTRATION; PERINEURAL AS NEEDED
Status: DISCONTINUED | OUTPATIENT
Start: 2019-09-06 | End: 2019-09-06 | Stop reason: SURG

## 2019-09-06 RX ADMIN — APIXABAN 5 MG: 5 TABLET, FILM COATED ORAL at 07:57

## 2019-09-06 RX ADMIN — PROPOFOL 50 MG: 10 INJECTION, EMULSION INTRAVENOUS at 09:15

## 2019-09-06 RX ADMIN — LAMOTRIGINE 200 MG: 100 TABLET ORAL at 10:48

## 2019-09-06 RX ADMIN — SODIUM CHLORIDE: 9 INJECTION, SOLUTION INTRAVENOUS at 08:55

## 2019-09-06 RX ADMIN — METOPROLOL TARTRATE 75 MG: 50 TABLET, FILM COATED ORAL at 10:48

## 2019-09-06 RX ADMIN — LEVETIRACETAM 1500 MG: 500 TABLET, FILM COATED ORAL at 10:51

## 2019-09-06 RX ADMIN — ASPIRIN 81 MG: 81 TABLET, CHEWABLE ORAL at 07:57

## 2019-09-06 RX ADMIN — ASPIRIN 81 MG 243 MG: 81 TABLET ORAL at 23:09

## 2019-09-06 RX ADMIN — AMIODARONE HYDROCHLORIDE 200 MG: 200 TABLET ORAL at 10:48

## 2019-09-06 RX ADMIN — LIDOCAINE HYDROCHLORIDE 100 MG: 20 INJECTION, SOLUTION INFILTRATION; PERINEURAL at 09:10

## 2019-09-06 RX ADMIN — PANTOPRAZOLE SODIUM 40 MG: 40 TABLET, DELAYED RELEASE ORAL at 10:51

## 2019-09-06 RX ADMIN — PROPOFOL 50 MG: 10 INJECTION, EMULSION INTRAVENOUS at 09:22

## 2019-09-06 RX ADMIN — PROPOFOL 50 MG: 10 INJECTION, EMULSION INTRAVENOUS at 09:10

## 2019-09-06 NOTE — ANESTHESIA PREPROCEDURE EVALUATION
Anesthesia Evaluation     Patient summary reviewed   no history of anesthetic complications:  NPO Solid Status: > 8 hours  NPO Liquid Status: > 8 hours           Airway   Mallampati: II  TM distance: >3 FB  Neck ROM: full  Dental          Pulmonary - normal exam    breath sounds clear to auscultation  (+) a smoker (quit 1993) Former, asthma, shortness of breath, sleep apnea (not compliant with CPAP),   (-) recent URI  Cardiovascular   Exercise tolerance: poor (<4 METS)    ECG reviewed  Patient on routine beta blocker and Beta blocker given within 24 hours of surgery  Rhythm: regular  Rate: normal    (+) hypertension, valvular problems/murmurs, past MI  <3 months, CAD, CABG (7/2019), cardiac stents (Multiple stents, with balloon angioplasty last week) dysrhythmias Atrial Fib, Atrial Flutter, murmur, PVD, hyperlipidemia,  carotid artery disease (Left worse than right)  (-) pacemaker    ROS comment: TTE 6/28/19 - ·Left ventricular wall thickness is consistent with moderate concentric hypertrophy.  ·Estimated EF = 65%.  ·Left ventricular diastolic dysfunction.  ·Mild to moderate aortic valve regurgitation is present.  ·Mild aortic valve stenosis is present. Aortic valve leaflets not well visualized  ·No evidence of pulmonary hypertension is present.    Cath 6/26/19 - Conclusion of cardiac catheterization     Left main coronary arteries normal  Left anterior descending coronary artery has patent stents  The distal edge of the stent in the mid left anterior descending coronary artery has approximately 60% stenosis  Highly abnormal fractional flow reserve of this at 0.78 without adenosine stress.  PTCA done of the segment.  Despite multiple attempts and use of guide liner could not advance the stent due to earlier implanted stents.  Left circumflex coronary artery is codominant and large  The proximal portion of the first obtuse marginal branch has a 60% stenosis with normal fractional flow reserve above 0.85.  Right  coronary artery is large and codominant without any high-grade lesions.     Left ventricular end-diastolic pressure is mildly elevated to 15 mmHg.  No gradient across aortic valve on pullback.  Left ventriculography shows a hyperdynamic left ventricle with ejection fraction of 75%.     Percutaneous coronary intervention     XB 3.5 guide advised used for fractional flow reserve of the obtuse marginal branch as well as of the left anterior descending coronary artery  Then we did try to advance a 2.5 mm stent.  We used 2 different size stents and could not cross  We used a guide liner and does not did not have placed cross across the stent.  We then did balloon angioplasty using 2.0 mm balloon.  Stenosis was reduced from 60% down to less than 10%.  BEVERLEY-3 flow before and after procedure.    Neuro/Psych  (+) seizures (last one 5 months ago), CVA (2011),     (-) TIA  GI/Hepatic/Renal/Endo    (+) obesity,  GERD,  liver disease fatty liver disease, renal disease, diabetes mellitus using insulin,   (-) hypothyroidism, hyperthyroidism    Musculoskeletal     Abdominal   (+) obese,    Substance History      OB/GYN          Other   (+) arthritis     ROS/Med Hx Other: No history of esophageal surgeries or dysphagia                    Anesthesia Plan    ASA 4     MAC   (Pt is two months s/p cabg. Recently diagnosed with afib and was awaiting adequate anticoagulation prior to undergoing cardioversion. Admitted yesterday with rapid heart beat and chest pain which resolved with nitroglycerin. Cardiac enzymes negative x 3. Plan today to check for clot with CARLOS and proceed with cardioversion. )  intravenous induction   Anesthetic plan, all risks, benefits, and alternatives have been provided, discussed and informed consent has been obtained with: patient.

## 2019-09-06 NOTE — DISCHARGE SUMMARY
Date of Discharge:  9/6/2019    Discharge Diagnosis:   Atrial flutter (CMS/HCC)      Presenting Problem/History of Present Illness  Atrial flutter, unspecified type (CMS/HCC) [I48.92]  Atrial flutter, unspecified type (CMS/HCC) [I48.92]      HPI per H&P and Hospital Course  Patient is a 59 y.o. male with known PMH significant for CAD, CABG, HTN, HLD, DM and had short term post op AF and atrial flutter. He was last seen in the ER on 8/30 with complaints of left arm numbness. He was also told 2 days prior to his ER visit, by his podiatrist that he was tachycardic. He was placed on Eliquis 5mg BID and continue ASA. He was told to follow up with Dr. Ramey in 2 weeks to plan an outpatient cardioversion. He presented to the ER yeasterday after having an episode of nausea with vomiting with following chest pain. He states he did have associated shortness of breath. He states the chest pain and shortness of breath was relieved with Nitro. He states he has not had any previous episodes since last Friday until today. He has not missed any doses of his Eliquis or Amio. He has not been to cardiac rehab since last Friday. He states his HR has been 120-150 at home and he has been asymptomatic. He states he has not noticed any swelling but also has not weighed himself in a few days. On arrival to the ER he was noted to be in Atrial flutter with RVR -150. He received a 20mg bolus of Cardizem and HR improved briefly. Troponins thus far are negative. BNP in elevated at 1,150. Creat 1.58. All other labs are unremarkable. CXR  shows cardiomegaly with no pulmonary vascular congestion.     Patient underwent ischemic work up due to chest pain that was relieved with Nitro. Enzymes were negative. EKG showed no changes. Patient underwent a CARLOS/cardioversion today per Dr. Llamas. Patient displayed NSR after 1 biphasic shock of 50J. Patient tolerated the procedure well. He remains in NSR at this time. Patient is ready for discharge today.      Procedures Performed  Cardioversion     Procedure Prep Informed consent was obtained. Patient brought to procedure room with an initial rhythm of Atrial Flutter. Sedation was performed by a anesthesiologist.   Shock 1 Synchronized with a biphasic shock. 50 joules delivered to patient.   Impression Post cardioversion the patient displayed a sinus rhythm.The cardioversion was successful.            Review of Systems   Constitutional: Negative for chills, diaphoresis, fatigue and unexpected weight change.   HENT: Negative for nosebleeds.    Respiratory: Negative for cough, chest tightness, shortness of breath and wheezing.    Cardiovascular: Negative for chest pain, palpitations and leg swelling.   Gastrointestinal: Negative for anal bleeding, blood in stool, diarrhea and nausea.   Neurological: Negative for dizziness, syncope and light-headedness.         Pertinent Test Results:  Lab Results (last 72 hours)     Procedure Component Value Units Date/Time    POC Glucose Once [[id number]]  (Abnormal) Collected:  09/06/19 1222    Specimen:  Blood Updated:  09/06/19 1233     Glucose 162 mg/dL      Comment: : [id number] [employer] Gerda MMeter ID: [id number]       POC Glucose Once [[id number]]  (Normal) Collected:  09/06/19 1043    Specimen:  Blood Updated:  09/06/19 1103     Glucose 84 mg/dL      Comment: : [id number] [employer]) Gerda MMeter ID: [id number]       Basic Metabolic Panel [[id number]]  (Abnormal) Collected:  09/06/19 0444    Specimen:  Blood Updated:  09/06/19 0545     Glucose 67 mg/dL      BUN 16 mg/dL      Creatinine 1.08 mg/dL      Sodium 141 mmol/L      Potassium 4.3 mmol/L      Chloride 109 mmol/L      CO2 25.0 mmol/L      Calcium 9.2 mg/dL      eGFR Non African Amer 70 mL/min/1.73      BUN/Creatinine Ratio 14.8     Anion Gap 7.0 mmol/L     Narrative:       GFR Normal >60  Chronic Kidney Disease <60  Kidney Failure <15    CBC (No Diff) [[id number]]  (Abnormal) Collected:  09/06/19  0444    Specimen:  Blood Updated:  09/06/19 0528     WBC 5.65 10*3/mm3      RBC 4.27 10*6/mm3      Hemoglobin 11.4 g/dL      Hematocrit 36.6 %      MCV 85.7 fL      MCH 26.7 pg      MCHC 31.1 g/dL      RDW 13.7 %      RDW-SD 42.4 fl      MPV 11.9 fL      Platelets 133 10*3/mm3     Troponin [[id number]]  (Normal) Collected:  09/05/19 1813    Specimen:  Blood Updated:  09/05/19 1843     Troponin I <0.012 ng/mL     Troponin [[id number]]  (Normal) Collected:  09/05/19 1457    Specimen:  Blood Updated:  09/05/19 1526     Troponin I 0.014 ng/mL     Urine Drug Screen - Urine, Clean Catch [[id number]]  (Normal) Collected:  09/05/19 1216    Specimen:  Urine, Clean Catch Updated:  09/05/19 1248     Amphetamine Screen, Urine Negative     Barbiturates Screen, Urine Negative     Benzodiazepine Screen, Urine Negative     Cocaine Screen, Urine Negative     Methadone Screen, Urine Negative     Opiate Screen Negative     Phencyclidine (PCP), Urine Negative     THC, Screen, Urine Negative    Narrative:       Negative Thresholds For Drugs Screened in Urine:    Amphetamines          500 ng/ml  Barbiturates          200 ng/ml  Benzodiazepines       200 ng/ml  Cocaine               150 ng/ml  Methadone             150 ng/ml  Opiates               300 ng/ml  Phencyclidine         25 ng/ml  THC                      50 ng/ml    The normal value for all drugs tested is negative. This report includes final unconfirmed screening results.  A positive result by this assay can be, at your request, sent to the Reference Lab for confirmation by gas chromatography. Unconfirmed results must not be used for non-medical purposes, such as employment or legal testing. Clinical consideration should be applied to any drug of abuse test result, particularly when unconfirmed results are used.    TSH [[id number]]  (Normal) Collected:  09/05/19 1141    Specimen:  Blood Updated:  09/05/19 1248     TSH 1.810 uIU/mL     [place] Draw [[id number]] Collected:  09/05/19  1141    Specimen:  Blood Updated:  09/05/19 1246    Narrative:       The following orders were created for panel order [place] Draw.  Procedure                               Abnormality         Status                     ---------                               -----------         ------                     Light Blue Top[[id number]]                                   Final result               Green Top (Gel)[[id number]]                                  Final result               Lavender Top[[id number]]                                     Final result               Red Top[[id number]]                                          Final result                 Please view results for these tests on the individual orders.    Green Top (Gel) [[id number]] Collected:  09/05/19 1141    Specimen:  Blood Updated:  09/05/19 1246     Extra Tube Hold for add-ons.     Comment: Auto resulted.       Light Blue Top [[id number]] Collected:  09/05/19 1141    Specimen:  Blood Updated:  09/05/19 1246     Extra Tube hold for add-on     Comment: Auto resulted       Lavender Top [[id number]] Collected:  09/05/19 1141    Specimen:  Blood Updated:  09/05/19 1246     Extra Tube hold for add-on     Comment: Auto resulted       Red Top [[id number]] Collected:  09/05/19 1141    Specimen:  Blood Updated:  09/05/19 1246     Extra Tube Hold for add-ons.     Comment: Auto resulted.       T4, Free [[id number]]  (Normal) Collected:  09/05/19 1141    Specimen:  Blood Updated:  09/05/19 1233     Free T4 0.96 ng/dL     Troponin [[id number]]  (Normal) Collected:  09/05/19 1141    Specimen:  Blood Updated:  09/05/19 1226     Troponin I <0.012 ng/mL     BNP [[id number]]  (Abnormal) Collected:  09/05/19 1141    Specimen:  Blood Updated:  09/05/19 1226     proBNP 1,150.0 pg/mL     Myoglobin, Serum [[id number]]  (Normal) Collected:  09/05/19 1141    Specimen:  Blood Updated:  09/05/19 1226     Myoglobin 34.4 ng/mL     Urinalysis With Culture If Indicated - Urine, Clean Catch  [[id number]]  (Abnormal) Collected:  09/05/19 1216    Specimen:  Urine, Clean Catch Updated:  09/05/19 1224     Color, UA Yellow     Appearance, UA Clear     pH, UA 5.5     Specific Gravity, UA >=1.030     Glucose, UA >=1000 mg/dL (3+)     Ketones, UA Negative     Bilirubin, UA Negative     Blood, UA Negative     Protein, UA Negative     Leuk Esterase, UA Negative     Nitrite, UA Negative     Urobilinogen, UA 1.0 E.U./dL    Narrative:       Urine microscopic not indicated.    Comprehensive Metabolic Panel [[id number]]  (Abnormal) Collected:  09/05/19 1141    Specimen:  Blood Updated:  09/05/19 1218     Glucose 95 mg/dL      BUN 17 mg/dL      Creatinine 1.58 mg/dL      Sodium 142 mmol/L      Potassium 4.6 mmol/L      Chloride 110 mmol/L      CO2 24.0 mmol/L      Calcium 9.6 mg/dL      Total Protein 7.1 g/dL      Albumin 4.10 g/dL      ALT (SGPT) 30 U/L      AST (SGOT) 30 U/L      Alkaline Phosphatase 87 U/L      Total Bilirubin 0.5 mg/dL      eGFR Non African Amer 45 mL/min/1.73      Globulin 3.0 gm/dL      A/G Ratio 1.4 g/dL      BUN/Creatinine Ratio 10.8     Anion Gap 8.0 mmol/L     Narrative:       GFR Normal >60  Chronic Kidney Disease <60  Kidney Failure <15    Lipase [[id number]]  (Normal) Collected:  09/05/19 1141    Specimen:  Blood Updated:  09/05/19 1214     Lipase 55 U/L     Magnesium [[id number]]  (Normal) Collected:  09/05/19 1141    Specimen:  Blood Updated:  09/05/19 1214     Magnesium 1.9 mg/dL     CK [[id number]]  (Normal) Collected:  09/05/19 1141    Specimen:  Blood Updated:  09/05/19 1214     Creatine Kinase 103 U/L     Protime-INR [[id number]]  (Normal) Collected:  09/05/19 1141    Specimen:  Blood Updated:  09/05/19 1209     Protime 14.4 Seconds      INR 1.08    aPTT [[id number]]  (Normal) Collected:  09/05/19 1141    Specimen:  Blood Updated:  09/05/19 1209     PTT 32.6 seconds     CBC & Differential [[id number]] Collected:  09/05/19 1141    Specimen:  Blood Updated:  09/05/19 1203    Narrative:        The following orders were created for panel order CBC & Differential.  Procedure                               Abnormality         Status                     ---------                               -----------         ------                     CBC Auto Differential[[id number]]        Abnormal            Final result                 Please view results for these tests on the individual orders.    CBC Auto Differential [[id number]]  (Abnormal) Collected:  09/05/19 1141    Specimen:  Blood Updated:  09/05/19 1203     WBC 5.34 10*3/mm3      RBC 4.32 10*6/mm3      Hemoglobin 11.5 g/dL      Hematocrit 37.3 %      MCV 86.3 fL      MCH 26.6 pg      MCHC 30.8 g/dL      RDW 13.6 %      RDW-SD 42.6 fl      MPV 11.4 fL      Platelets 144 10*3/mm3      Neutrophil % 57.4 %      Lymphocyte % 30.9 %      Monocyte % 7.7 %      Eosinophil % 3.2 %      Basophil % 0.6 %      Immature Grans % 0.2 %      Neutrophils, Absolute 3.07 10*3/mm3      Lymphocytes, Absolute 1.65 10*3/mm3      Monocytes, Absolute 0.41 10*3/mm3      Eosinophils, Absolute 0.17 10*3/mm3      Basophils, Absolute 0.03 10*3/mm3      Immature Grans, Absolute 0.01 10*3/mm3      nRBC 0.0 /100 WBC           Ejection Fraction  Lab Results   Component Value Date    EF 63 02/03/2017       Echo EF Estimated  Lab Results   Component Value Date    ECHOEFEST 60 07/07/2019       Nuclear Stress Ejection Fraction  No components found for: NUCEF    Cath Ejection Fraction Quantitative  No results found for: CATHEF    Condition on Discharge:  stable    Vital Signs  Temp:  [97.9 °F (36.6 °C)-98.4 °F (36.9 °C)] 98.4 °F (36.9 °C)  Heart Rate:  [] 62  Resp:  [10-21] 20  BP: (104-147)/(70-93) 114/75  Tele: NSR 69   Physical Exam:  Physical Exam   Constitutional: He is oriented to person, place, and time. He appears well-developed and well-nourished. No distress.   HENT:   Head: Normocephalic.   Mouth/Throat: No oropharyngeal exudate.   Eyes: Conjunctivae and EOM are normal. Pupils are  equal, round, and reactive to light. No scleral icterus.   Neck: Normal range of motion. Neck supple.   Cardiovascular: Normal rate, regular rhythm, normal heart sounds and intact distal pulses.   No murmur heard.  Pulmonary/Chest: Effort normal and breath sounds normal. No respiratory distress. He has no wheezes. He has no rales. He exhibits no tenderness.   Abdominal: Soft. Bowel sounds are normal. He exhibits no distension. There is no tenderness.   Musculoskeletal: Normal range of motion. He exhibits no edema.   Neurological: He is alert and oriented to person, place, and time. He has normal reflexes.   Skin: Skin is warm and dry. No rash noted. He is not diaphoretic. No erythema. No pallor.   Psychiatric: He has a normal mood and affect. His behavior is normal.   Vitals reviewed.    Discharge Disposition      Discharge Medications     Discharge Medications      Changes to Medications      Instructions Start Date   amiodarone 200 MG tablet  Commonly known as:  PACERONE  What changed:    · medication strength  · how much to take   200 mg, Oral, 2 Times Daily      levETIRAcetam 500 MG tablet  Commonly known as:  KEPPRA  What changed:    · how much to take  · when to take this  · additional instructions   Take 3 tablets every morning, take 4 tablets every night.         Continue These Medications      Instructions Start Date   albuterol sulfate  (90 Base) MCG/ACT inhaler  Commonly known as:  PROVENTIL HFA;VENTOLIN HFA;PROAIR HFA   2 puffs, Inhalation, Every 6 Hours PRN      apixaban 5 MG tablet tablet  Commonly known as:  ELIQUIS   5 mg, Oral, Every 12 Hours Scheduled      aspirin 81 MG chewable tablet   81 mg, Oral, Daily      calcium polycarbophil 625 MG tablet  Commonly known as:  FIBERCON   625 mg, Oral, Daily      carboxymethylcellulose 0.5 % solution  Commonly known as:  REFRESH PLUS   1 drop, Both Eyes, 4 Times Daily PRN      fluticasone 50 MCG/ACT nasal spray  Commonly known as:  FLONASE   2 sprays,  Nasal, Daily      gabapentin 100 MG capsule  Commonly known as:  NEURONTIN   100 mg, Oral, Nightly      insulin aspart 100 UNIT/ML solution pen-injector sc pen  Commonly known as:  novoLOG FLEXPEN   30 Units, Subcutaneous, Every Morning, 34 units  at breakfast, 30 units at lunch, 34 units hs      Insulin Glargine 100 UNIT/ML injection pen  Commonly known as:  LANTUS SOLOSTAR   60-90 Units, Subcutaneous, 2 Times Daily, 65 units QAM AND 70 units QPM      JARDIANCE 10 MG tablet  Generic drug:  Empagliflozin   1 tablet, Oral, Daily      lamoTRIgine 200 MG tablet  Commonly known as:  LaMICtal   200 mg, Oral, 2 Times Daily      metFORMIN 1000 MG tablet  Commonly known as:  GLUCOPHAGE   1,000 mg, Oral, 2 Times Daily With Meals, HOLD x 48 hours post contrast. May resume 3/18/18      metoprolol tartrate 50 MG tablet  Commonly known as:  LOPRESSOR   25 mg, Oral, 2 Times Daily      MULTI VITAMIN PO   1 tablet, Oral, Daily      mupirocin 2 % ointment  Commonly known as:  BACTROBAN   1 application, Topical, 3 Times Daily      nitroglycerin 0.4 MG SL tablet  Commonly known as:  NITROSTAT   0.4 mg, Sublingual, Every 5 Minutes PRN, Take no more than 3 doses in 15 minutes.      ondansetron ODT 4 MG disintegrating tablet  Commonly known as:  ZOFRAN-ODT   4 mg, Oral, Every 4 Hours PRN      pantoprazole 40 MG EC tablet  Commonly known as:  PROTONIX   40 mg, Oral, 2 Times Daily      psyllium 58.6 % packet  Commonly known as:  METAMUCIL   1 packet, Oral, Daily PRN      rosuvastatin 40 MG tablet  Commonly known as:  CRESTOR   20 mg, Oral, Nightly      urea 40 % ointment  Commonly known as:  CARMOL   Topical, As Needed      VICTOZA SC   1.2 mg, Subcutaneous, Nightly, 1.2             Discharge Diet: heart healthy, low salt.     Activity at Discharge: resume normal activity.    Follow-up Appointments  Future Appointments   Date Time Provider Department Center   9/9/2019 10:00 AM PHASE II - CARD REHAB [facility] PAD [facility]   9/11/2019 10:00  AM PHASE II - CARD REHAB [facility] PAD [facility] P[facility]   9/13/2019 10:00 AM PHASE II - CARD REHAB [facility] PAD [facility] PAD [facility] PAD   9/13/2019 12:00 PM Wade Ramey MD [facility] PAD [facility]   9/16/2019 10:00 AM PHASE II - CARD REHAB [facility] PAD [facility] PAD [facility] PAD   9/18/2019 10:00 AM PHASE II - CARD REHAB [facility] PAD [facility] PAD [facility] PAD   9/20/2019 10:00 AM PHASE II - CARD REHAB [facility] PAD [facility] PAD [facility] PAD   9/23/2019 10:00 AM PHASE II - CARD REHAB [facility] PAD [facility] PAD [facility] PAD   9/25/2019 10:00 AM PHASE II - CARD REHAB [facility] PAD [facility] PAD [facility] PAD   9/27/2019 10:00 AM PHASE II - CARD REHAB [facility] PAD [facility] PAD [facility] PAD   9/30/2019 10:00 AM PHASE II - CARD REHAB [facility] PAD [facility] PAD [facility] PAD   10/2/2019 10:00 AM PHASE II - CARD REHAB [facility] PAD [facility] PAD [facility] PAD   10/4/2019 10:00 AM PHASE II - CARD REHAB [facility] PAD [facility] PAD CAR[facility] PAD   10/7/2019 10:00 AM PHASE II - CARD REHAB [facility] PAD [facility] PAD CAR[facility] PAD   10/9/2019 10:00 AM PHASE II - CARD REHAB [facility] PAD [facility] PAD [facility] PAD   10/11/2019 10:00 AM PHASE II - CARD REHAB [facility] PAD BH PAD CAR[facility] PAD   10/14/2019 10:00 AM PHASE II - CARD REHAB [facility] PAD BH PAD CARDR PAD   10/16/2019 10:00 AM PHASE II - CARD REHAB [facility] PAD BH PAD CAR[facility] PAD   10/18/2019 10:00 AM PHASE II - CARD REHAB BH PAD BH PAD CAR[facility] PAD   10/21/2019 10:00 AM PHASE II - CARD REHAB [facility] PAD BH PAD CAR[facility] PAD   10/23/2019 10:00 AM PHASE II - CARD REHAB [facility] PAD BH PAD [facility] PAD   10/25/2019 10:00 AM PHASE II - CARD REHAB [facility] PAD [facility] PAD CAR[facility] PAD   10/28/2019 10:00 AM PHASE II - CARD REHAB [facility] PAD BH PAD CARDR PAD   10/30/2019 10:00 AM PHASE II - CARD REHAB [facility] PAD BH PAD CARDR PAD   11/1/2019 10:00 AM PHASE II - CARD REHAB [facility] PAD BH PAD CAR[facility] PAD   11/4/2019 10:00 AM PHASE II - CARD REHAB BH PAD BH PAD CARDR PAD   11/6/2019  8:40 AM Usha Hammer APRN [facility] N PAD None   11/6/2019 10:00 AM PHASE II - CARD REHAB BH PAD BH PAD CARDR PAD   11/8/2019 10:00 AM PHASE II - CARD REHAB BH PAD BH PAD CARDR PAD   11/11/2019 10:00 AM PHASE II - CARD REHAB BH PAD BH PAD CARDR PAD   11/13/2019 10:00 AM PHASE  II - CARD REHAB BH PAD BH PAD CARDR PAD   11/15/2019 10:00 AM PHASE II - CARD REHAB BH PAD BH PAD CARDR PAD   12/20/2019  9:30 AM Wade Ramey MD [facility] PAD [facility]   2/26/2020 11:00 AM Steven Tang MD [facility] CTS  PAD None       Plan:  -d/c home today  -resume home medications. Instructed to continue Eliquis 5mg BID. No changes have been made to his amio. Patient was taking 200mg BID but was placed in the computer wrong.   -keep f/u with Dr. Ramey on 9/13.   -may resume cardiac rehab  -please call our office with concerns       TEENA Barahona  09/06/19  1:34 PM

## 2019-09-06 NOTE — ANESTHESIA POSTPROCEDURE EVALUATION
Patient: Carlos A Boyer Jr.    Procedure Summary     Date:  09/06/19 Room / Location:  [facility] CATH LAB    Anesthesia Start:  0855 Anesthesia Stop:  0934    Procedures:       CARDIOVERSION EXTERNAL IN CARDIOLOGY DEPARTMENT      ADULT TRANSESOPHAGEAL ECHO (CARLOS) W/ CONT IF NECESSARY PER PROTOCOL Diagnosis:       (atrial flutter)      (Arrhythmia)    Scheduled Providers:   Provider:  Cyril Cardenas CRNA    Anesthesia Type:  MAC ASA Status:  4          Anesthesia Type: MAC  Last vitals  BP   132/87 (09/06/19 0740)   Temp   98.4 °F (36.9 °C) (09/06/19 0455)   Pulse   62 (09/06/19 0859)   Resp   20 (09/06/19 0859)     SpO2   98 % (09/06/19 0859)     Post Anesthesia Care and Evaluation    Patient location during evaluation: PHASE II  Level of consciousness: awake and alert  Pain management: adequate  Airway patency: patent  Anesthetic complications: No anesthetic complications    Cardiovascular status: acceptable  Respiratory status: acceptable  Hydration status: acceptable

## 2019-09-06 NOTE — PLAN OF CARE
Problem: Patient Care Overview  Goal: Plan of Care Review  Outcome: Ongoing (interventions implemented as appropriate)   09/06/19 0313   Coping/Psychosocial   Plan of Care Reviewed With patient   Plan of Care Review   Progress no change   OTHER   Outcome Summary No c/o chest pain ,up ad gamal gait steady , heart rate 69 a flutter pt npo , cont to monitor ,        Problem: Arrhythmia/Dysrhythmia (Symptomatic) (Adult)  Goal: Signs and Symptoms of Listed Potential Problems Will be Absent, Minimized or Managed (Arrhythmia/Dysrhythmia)  Outcome: Ongoing (interventions implemented as appropriate)

## 2019-09-07 ENCOUNTER — APPOINTMENT (OUTPATIENT)
Dept: CT IMAGING | Facility: HOSPITAL | Age: 59
End: 2019-09-07

## 2019-09-07 ENCOUNTER — READMISSION MANAGEMENT (OUTPATIENT)
Dept: CALL CENTER | Facility: HOSPITAL | Age: 59
End: 2019-09-07

## 2019-09-07 PROBLEM — J81.0 ACUTE PULMONARY EDEMA (HCC): Status: ACTIVE | Noted: 2019-09-07

## 2019-09-07 PROBLEM — I50.9 ACUTE CONGESTIVE HEART FAILURE: Status: ACTIVE | Noted: 2019-09-07

## 2019-09-07 LAB
ALBUMIN SERPL-MCNC: 4.3 G/DL (ref 3.5–5)
ALBUMIN SERPL-MCNC: 4.7 G/DL (ref 3.5–5)
ALBUMIN/GLOB SERPL: 1.4 G/DL (ref 1.1–2.5)
ALBUMIN/GLOB SERPL: 1.4 G/DL (ref 1.1–2.5)
ALP SERPL-CCNC: 104 U/L (ref 24–120)
ALP SERPL-CCNC: 87 U/L (ref 24–120)
ALT SERPL W P-5'-P-CCNC: 109 U/L (ref 0–54)
ALT SERPL W P-5'-P-CCNC: 87 U/L (ref 0–54)
ANION GAP SERPL CALCULATED.3IONS-SCNC: 12 MMOL/L (ref 4–13)
ANION GAP SERPL CALCULATED.3IONS-SCNC: 9 MMOL/L (ref 4–13)
APTT PPP: 27.1 SECONDS (ref 24.1–35)
ARTERIAL PATENCY WRIST A: ABNORMAL
ARTICHOKE IGE QN: 74 MG/DL (ref 0–99)
AST SERPL-CCNC: 100 U/L (ref 7–45)
AST SERPL-CCNC: 87 U/L (ref 7–45)
ATMOSPHERIC PRESS: 751 MMHG
BASE EXCESS BLDA CALC-SCNC: -2.7 MMOL/L (ref 0–2)
BASOPHILS # BLD AUTO: 0.04 10*3/MM3 (ref 0–0.2)
BASOPHILS NFR BLD AUTO: 0.4 % (ref 0–1.5)
BDY SITE: ABNORMAL
BILIRUB SERPL-MCNC: 0.9 MG/DL (ref 0.1–1)
BILIRUB SERPL-MCNC: 1.3 MG/DL (ref 0.1–1)
BODY TEMPERATURE: 37 C
BUN BLD-MCNC: 24 MG/DL (ref 5–21)
BUN BLD-MCNC: 24 MG/DL (ref 5–21)
BUN/CREAT SERPL: 19.4 (ref 7–25)
BUN/CREAT SERPL: 20.2 (ref 7–25)
CALCIUM SPEC-SCNC: 10.1 MG/DL (ref 8.4–10.4)
CALCIUM SPEC-SCNC: 9.7 MG/DL (ref 8.4–10.4)
CHLORIDE SERPL-SCNC: 106 MMOL/L (ref 98–110)
CHLORIDE SERPL-SCNC: 109 MMOL/L (ref 98–110)
CHOLEST SERPL-MCNC: 120 MG/DL (ref 130–200)
CO2 SERPL-SCNC: 22 MMOL/L (ref 24–31)
CO2 SERPL-SCNC: 24 MMOL/L (ref 24–31)
CREAT BLD-MCNC: 1.19 MG/DL (ref 0.5–1.4)
CREAT BLD-MCNC: 1.24 MG/DL (ref 0.5–1.4)
D DIMER PPP FEU-MCNC: 2.01 MG/L (FEU) (ref 0–0.5)
DEPRECATED RDW RBC AUTO: 42.8 FL (ref 37–54)
EOSINOPHIL # BLD AUTO: 0.04 10*3/MM3 (ref 0–0.4)
EOSINOPHIL NFR BLD AUTO: 0.4 % (ref 0.3–6.2)
ERYTHROCYTE [DISTWIDTH] IN BLOOD BY AUTOMATED COUNT: 13.8 % (ref 12.3–15.4)
GAS FLOW AIRWAY: 2 LPM
GFR SERPL CREATININE-BSD FRML MDRD: 59 ML/MIN/1.73
GFR SERPL CREATININE-BSD FRML MDRD: 62 ML/MIN/1.73
GLOBULIN UR ELPH-MCNC: 3.1 GM/DL
GLOBULIN UR ELPH-MCNC: 3.3 GM/DL
GLUCOSE BLD-MCNC: 89 MG/DL (ref 70–100)
GLUCOSE BLD-MCNC: 95 MG/DL (ref 70–100)
GLUCOSE BLDC GLUCOMTR-MCNC: 185 MG/DL (ref 70–130)
GLUCOSE BLDC GLUCOMTR-MCNC: 187 MG/DL (ref 70–130)
GLUCOSE BLDC GLUCOMTR-MCNC: 248 MG/DL (ref 70–130)
HBA1C MFR BLD: 6.5 % (ref 4.8–5.9)
HCO3 BLDA-SCNC: 21.8 MMOL/L (ref 20–26)
HCT VFR BLD AUTO: 41.4 % (ref 37.5–51)
HDLC SERPL-MCNC: 36 MG/DL
HGB BLD-MCNC: 13.1 G/DL (ref 13–17.7)
HOLD SPECIMEN: NORMAL
IMM GRANULOCYTES # BLD AUTO: 0.03 10*3/MM3 (ref 0–0.05)
IMM GRANULOCYTES NFR BLD AUTO: 0.3 % (ref 0–0.5)
INR PPP: 1.21 (ref 0.91–1.09)
LDLC/HDLC SERPL: 1.92 {RATIO}
LYMPHOCYTES # BLD AUTO: 1.59 10*3/MM3 (ref 0.7–3.1)
LYMPHOCYTES NFR BLD AUTO: 15.4 % (ref 19.6–45.3)
Lab: ABNORMAL
MCH RBC QN AUTO: 26.7 PG (ref 26.6–33)
MCHC RBC AUTO-ENTMCNC: 31.6 G/DL (ref 31.5–35.7)
MCV RBC AUTO: 84.5 FL (ref 79–97)
MODALITY: ABNORMAL
MONOCYTES # BLD AUTO: 0.73 10*3/MM3 (ref 0.1–0.9)
MONOCYTES NFR BLD AUTO: 7.1 % (ref 5–12)
NEUTROPHILS # BLD AUTO: 7.87 10*3/MM3 (ref 1.7–7)
NEUTROPHILS NFR BLD AUTO: 76.4 % (ref 42.7–76)
NRBC BLD AUTO-RTO: 0 /100 WBC (ref 0–0.2)
NT-PROBNP SERPL-MCNC: 732 PG/ML (ref 0–900)
PCO2 BLDA: 36 MM HG (ref 35–45)
PH BLDA: 7.39 PH UNITS (ref 7.35–7.45)
PLATELET # BLD AUTO: 192 10*3/MM3 (ref 140–450)
PMV BLD AUTO: 12 FL (ref 6–12)
PO2 BLDA: 68 MM HG (ref 83–108)
POTASSIUM BLD-SCNC: 4.4 MMOL/L (ref 3.5–5.3)
POTASSIUM BLD-SCNC: 5.1 MMOL/L (ref 3.5–5.3)
PROT SERPL-MCNC: 7.4 G/DL (ref 6.3–8.7)
PROT SERPL-MCNC: 8 G/DL (ref 6.3–8.7)
PROTHROMBIN TIME: 15.7 SECONDS (ref 11.9–14.6)
RBC # BLD AUTO: 4.9 10*6/MM3 (ref 4.14–5.8)
SAO2 % BLDCOA: 90.3 % (ref 94–99)
SODIUM BLD-SCNC: 140 MMOL/L (ref 135–145)
SODIUM BLD-SCNC: 142 MMOL/L (ref 135–145)
TRIGL SERPL-MCNC: 74 MG/DL (ref 0–149)
TROPONIN I SERPL-MCNC: 0.02 NG/ML (ref 0–0.03)
TROPONIN I SERPL-MCNC: 0.02 NG/ML (ref 0–0.03)
TSH SERPL DL<=0.05 MIU/L-ACNC: 2.02 UIU/ML (ref 0.47–4.68)
VENTILATOR MODE: ABNORMAL
WBC NRBC COR # BLD: 10.3 10*3/MM3 (ref 3.4–10.8)
WHOLE BLOOD HOLD SPECIMEN: NORMAL

## 2019-09-07 PROCEDURE — 93010 ELECTROCARDIOGRAM REPORT: CPT | Performed by: INTERNAL MEDICINE

## 2019-09-07 PROCEDURE — 83036 HEMOGLOBIN GLYCOSYLATED A1C: CPT | Performed by: FAMILY MEDICINE

## 2019-09-07 PROCEDURE — 85025 COMPLETE CBC W/AUTO DIFF WBC: CPT | Performed by: FAMILY MEDICINE

## 2019-09-07 PROCEDURE — 80053 COMPREHEN METABOLIC PANEL: CPT | Performed by: EMERGENCY MEDICINE

## 2019-09-07 PROCEDURE — 82803 BLOOD GASES ANY COMBINATION: CPT

## 2019-09-07 PROCEDURE — 0 IOPAMIDOL PER 1 ML: Performed by: EMERGENCY MEDICINE

## 2019-09-07 PROCEDURE — 83880 ASSAY OF NATRIURETIC PEPTIDE: CPT | Performed by: EMERGENCY MEDICINE

## 2019-09-07 PROCEDURE — 80053 COMPREHEN METABOLIC PANEL: CPT | Performed by: FAMILY MEDICINE

## 2019-09-07 PROCEDURE — 25010000002 FUROSEMIDE PER 20 MG: Performed by: EMERGENCY MEDICINE

## 2019-09-07 PROCEDURE — 93005 ELECTROCARDIOGRAM TRACING: CPT | Performed by: EMERGENCY MEDICINE

## 2019-09-07 PROCEDURE — 82962 GLUCOSE BLOOD TEST: CPT

## 2019-09-07 PROCEDURE — 25010000002 ONDANSETRON PER 1 MG: Performed by: EMERGENCY MEDICINE

## 2019-09-07 PROCEDURE — 71275 CT ANGIOGRAPHY CHEST: CPT

## 2019-09-07 PROCEDURE — 84443 ASSAY THYROID STIM HORMONE: CPT | Performed by: FAMILY MEDICINE

## 2019-09-07 PROCEDURE — 84484 ASSAY OF TROPONIN QUANT: CPT | Performed by: EMERGENCY MEDICINE

## 2019-09-07 PROCEDURE — 99222 1ST HOSP IP/OBS MODERATE 55: CPT | Performed by: INTERNAL MEDICINE

## 2019-09-07 PROCEDURE — 80061 LIPID PANEL: CPT | Performed by: FAMILY MEDICINE

## 2019-09-07 PROCEDURE — 94799 UNLISTED PULMONARY SVC/PX: CPT

## 2019-09-07 PROCEDURE — 36600 WITHDRAWAL OF ARTERIAL BLOOD: CPT

## 2019-09-07 PROCEDURE — 94760 N-INVAS EAR/PLS OXIMETRY 1: CPT

## 2019-09-07 RX ORDER — ONDANSETRON 2 MG/ML
4 INJECTION INTRAMUSCULAR; INTRAVENOUS ONCE
Status: COMPLETED | OUTPATIENT
Start: 2019-09-07 | End: 2019-09-07

## 2019-09-07 RX ORDER — SODIUM CHLORIDE 0.9 % (FLUSH) 0.9 %
10 SYRINGE (ML) INJECTION EVERY 12 HOURS SCHEDULED
Status: DISCONTINUED | OUTPATIENT
Start: 2019-09-07 | End: 2019-09-08 | Stop reason: HOSPADM

## 2019-09-07 RX ORDER — LAMOTRIGINE 100 MG/1
200 TABLET ORAL 2 TIMES DAILY
Status: DISCONTINUED | OUTPATIENT
Start: 2019-09-07 | End: 2019-09-08 | Stop reason: HOSPADM

## 2019-09-07 RX ORDER — AMIODARONE HYDROCHLORIDE 200 MG/1
200 TABLET ORAL 2 TIMES DAILY
Status: DISCONTINUED | OUTPATIENT
Start: 2019-09-07 | End: 2019-09-07

## 2019-09-07 RX ORDER — FUROSEMIDE 10 MG/ML
60 INJECTION INTRAMUSCULAR; INTRAVENOUS ONCE
Status: COMPLETED | OUTPATIENT
Start: 2019-09-07 | End: 2019-09-07

## 2019-09-07 RX ORDER — ALBUTEROL SULFATE 2.5 MG/3ML
2.5 SOLUTION RESPIRATORY (INHALATION) EVERY 6 HOURS PRN
Status: DISCONTINUED | OUTPATIENT
Start: 2019-09-07 | End: 2019-09-08 | Stop reason: HOSPADM

## 2019-09-07 RX ORDER — CALCIUM POLYCARBOPHIL 625 MG 625 MG/1
625 TABLET ORAL DAILY
Status: DISCONTINUED | OUTPATIENT
Start: 2019-09-07 | End: 2019-09-08 | Stop reason: HOSPADM

## 2019-09-07 RX ORDER — ALBUTEROL SULFATE 90 UG/1
2 AEROSOL, METERED RESPIRATORY (INHALATION) EVERY 6 HOURS PRN
Status: DISCONTINUED | OUTPATIENT
Start: 2019-09-07 | End: 2019-09-07 | Stop reason: CLARIF

## 2019-09-07 RX ORDER — FAMOTIDINE 10 MG/ML
20 INJECTION, SOLUTION INTRAVENOUS 2 TIMES DAILY
Status: DISCONTINUED | OUTPATIENT
Start: 2019-09-07 | End: 2019-09-08 | Stop reason: HOSPADM

## 2019-09-07 RX ORDER — FUROSEMIDE 10 MG/ML
40 INJECTION INTRAMUSCULAR; INTRAVENOUS EVERY 12 HOURS
Status: DISCONTINUED | OUTPATIENT
Start: 2019-09-07 | End: 2019-09-07

## 2019-09-07 RX ORDER — LEVETIRACETAM 500 MG/1
2000 TABLET ORAL 2 TIMES DAILY
Status: DISCONTINUED | OUTPATIENT
Start: 2019-09-07 | End: 2019-09-07

## 2019-09-07 RX ORDER — FUROSEMIDE 40 MG/1
40 TABLET ORAL DAILY
Status: DISCONTINUED | OUTPATIENT
Start: 2019-09-08 | End: 2019-09-08 | Stop reason: HOSPADM

## 2019-09-07 RX ORDER — LEVETIRACETAM 500 MG/1
2000 TABLET ORAL NIGHTLY
Status: DISCONTINUED | OUTPATIENT
Start: 2019-09-07 | End: 2019-09-08 | Stop reason: HOSPADM

## 2019-09-07 RX ORDER — IPRATROPIUM BROMIDE AND ALBUTEROL SULFATE 2.5; .5 MG/3ML; MG/3ML
3 SOLUTION RESPIRATORY (INHALATION) ONCE
Status: DISCONTINUED | OUTPATIENT
Start: 2019-09-07 | End: 2019-09-07

## 2019-09-07 RX ORDER — LEVETIRACETAM 500 MG/1
500 TABLET ORAL TAKE AS DIRECTED
Status: DISCONTINUED | OUTPATIENT
Start: 2019-09-07 | End: 2019-09-07

## 2019-09-07 RX ORDER — ROSUVASTATIN CALCIUM 20 MG/1
20 TABLET, COATED ORAL NIGHTLY
Status: DISCONTINUED | OUTPATIENT
Start: 2019-09-07 | End: 2019-09-07

## 2019-09-07 RX ORDER — ONDANSETRON 2 MG/ML
4 INJECTION INTRAMUSCULAR; INTRAVENOUS EVERY 6 HOURS PRN
Status: DISCONTINUED | OUTPATIENT
Start: 2019-09-07 | End: 2019-09-08 | Stop reason: HOSPADM

## 2019-09-07 RX ORDER — ASPIRIN 81 MG/1
81 TABLET, CHEWABLE ORAL DAILY
Status: DISCONTINUED | OUTPATIENT
Start: 2019-09-07 | End: 2019-09-08 | Stop reason: HOSPADM

## 2019-09-07 RX ORDER — SODIUM CHLORIDE 0.9 % (FLUSH) 0.9 %
10 SYRINGE (ML) INJECTION AS NEEDED
Status: DISCONTINUED | OUTPATIENT
Start: 2019-09-07 | End: 2019-09-08 | Stop reason: HOSPADM

## 2019-09-07 RX ORDER — FUROSEMIDE 10 MG/ML
40 INJECTION INTRAMUSCULAR; INTRAVENOUS ONCE
Status: DISCONTINUED | OUTPATIENT
Start: 2019-09-07 | End: 2019-09-07

## 2019-09-07 RX ORDER — NITROGLYCERIN 0.4 MG/1
0.4 TABLET SUBLINGUAL
Status: DISCONTINUED | OUTPATIENT
Start: 2019-09-07 | End: 2019-09-08 | Stop reason: HOSPADM

## 2019-09-07 RX ORDER — LEVETIRACETAM 500 MG/1
1500 TABLET ORAL DAILY
Status: DISCONTINUED | OUTPATIENT
Start: 2019-09-07 | End: 2019-09-08 | Stop reason: HOSPADM

## 2019-09-07 RX ORDER — GABAPENTIN 100 MG/1
100 CAPSULE ORAL NIGHTLY
Status: DISCONTINUED | OUTPATIENT
Start: 2019-09-07 | End: 2019-09-08 | Stop reason: HOSPADM

## 2019-09-07 RX ADMIN — ASPIRIN 81 MG 81 MG: 81 TABLET ORAL at 13:04

## 2019-09-07 RX ADMIN — METOPROLOL TARTRATE 25 MG: 25 TABLET, FILM COATED ORAL at 20:52

## 2019-09-07 RX ADMIN — LAMOTRIGINE 200 MG: 100 TABLET ORAL at 20:53

## 2019-09-07 RX ADMIN — APIXABAN 5 MG: 5 TABLET, FILM COATED ORAL at 09:05

## 2019-09-07 RX ADMIN — NITROGLYCERIN 1 INCH: 20 OINTMENT TOPICAL at 05:01

## 2019-09-07 RX ADMIN — SODIUM CHLORIDE, PRESERVATIVE FREE 10 ML: 5 INJECTION INTRAVENOUS at 13:19

## 2019-09-07 RX ADMIN — GABAPENTIN 100 MG: 100 CAPSULE ORAL at 21:02

## 2019-09-07 RX ADMIN — LEVETIRACETAM 1500 MG: 500 TABLET, FILM COATED ORAL at 13:04

## 2019-09-07 RX ADMIN — LEVETIRACETAM 2000 MG: 500 TABLET, FILM COATED ORAL at 20:53

## 2019-09-07 RX ADMIN — APIXABAN 5 MG: 5 TABLET, FILM COATED ORAL at 20:52

## 2019-09-07 RX ADMIN — ONDANSETRON HYDROCHLORIDE 4 MG: 2 SOLUTION INTRAMUSCULAR; INTRAVENOUS at 03:38

## 2019-09-07 RX ADMIN — CALCIUM POLYCARBOPHIL 625 MG: 625 TABLET, FILM COATED ORAL at 09:06

## 2019-09-07 RX ADMIN — SODIUM CHLORIDE, PRESERVATIVE FREE 10 ML: 5 INJECTION INTRAVENOUS at 21:00

## 2019-09-07 RX ADMIN — LAMOTRIGINE 200 MG: 100 TABLET ORAL at 09:05

## 2019-09-07 RX ADMIN — FAMOTIDINE 20 MG: 10 INJECTION INTRAVENOUS at 09:07

## 2019-09-07 RX ADMIN — FUROSEMIDE 60 MG: 10 INJECTION, SOLUTION INTRAVENOUS at 05:02

## 2019-09-07 RX ADMIN — IOPAMIDOL 90 ML: 755 INJECTION, SOLUTION INTRAVENOUS at 02:23

## 2019-09-07 RX ADMIN — METOPROLOL TARTRATE 25 MG: 25 TABLET, FILM COATED ORAL at 09:06

## 2019-09-07 RX ADMIN — FAMOTIDINE 20 MG: 10 INJECTION INTRAVENOUS at 21:02

## 2019-09-07 RX ADMIN — AMIODARONE HYDROCHLORIDE 200 MG: 200 TABLET ORAL at 09:05

## 2019-09-07 NOTE — ED PROVIDER NOTES
Subjective   58 y/o male with history of CAD, CABG in July by Dr. Tang, HLD and DM who has also had afib who was actually cardioverted by Dr. Llamas yesterday 0930 who states he was doing well after the procedure but then began to note fullness in his epigastrium that relieved with nitro but then was replaced with SOB. He noted prior to this nausea and several episodes of vomiting as well as diaphoresis. He notes a dry cough, orthopnea and LE swelling (this has apparently improved). He denies any history of COPD of CHF, fevers, chills, falls, trauma, history of PE or DVT, recent cancer treatments or immobilizations. Of note he has been to the ED three times recently for similar issues. He arrives in [place]         Family, social and past history reviewed as below, prior documentation of H and Ps and other documentation are reviewed:    Past Medical History:  No date: Arthritis  No date: Asthma  No date: Carotid disease, bilateral (CMS/[facility])  No date: Chest pain  No date: Chronic ischemic heart disease  No date: Chronic sinusitis  No date: Maribell bullosa  No date: Coronary artery disease  No date: Deviated septum  No date: Diabetes mellitus (CMS/[facility])  No date: Difficulty urinating  No date: Diverticulitis  No date: Enlarged prostate  No date: Fatty liver  No date: GERD (gastroesophageal reflux disease)  No date: Hyperlipidemia  No date: Hypertension  No date: Hypertrophy of nasal turbinates  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 Broadbent; widely patent previously placed stents in                the left anterior descending and obstructive disease                involving the diagonal branch which was treated medically  7/14/2017: CARDIAC CATHETERIZATION; N/A      Comment:  Procedure: Left Heart Cath;  Surgeon: Wade Ramey MD;                 Location:  [facility];  Service:   10/15/2018: CARDIAC CATHETERIZATION; Left      Comment:  Procedure: Cardiac Catheterization/Vascular Study;                 Surgeon: Wade aRmey MD;  Location:  [facility] CATH                INVASIVE LOCATION;  Service: Cardiology  10/15/2018: CARDIAC CATHETERIZATION      Comment:  Procedure: Functional Flow [place];  Surgeon: Wade Ramey MD;  Location:  [facility];                 Service: Cardiology  10/15/2018: CARDIAC CATHETERIZATION; N/A      Comment:  Procedure: Left ventriculography;  Surgeon: Wade Ramey MD;  Location:  [facility];                 Service: Cardiology  6/26/2019: CARDIAC CATHETERIZATION; Left      Comment:  Procedure: Cardiac Catheterization/Vascular Study VEL OK               HE WILL WAIT 1 YEAR FOR SHOULDER SURGERY ;  Surgeon:                Wade Ramey MD;  Location:  [facility]                LOCATION;  Service: Cardiology  No date: CHOLECYSTECTOMY  8/1/2018: CHOLECYSTECTOMY WITH INTRAOPERATIVE CHOLANGIOGRAM; N/A      Comment:  Procedure: CHOLECYSTECTOMY LAPAROSCOPIC INTRAOPERATIVE                CHOLANGIOGRAM;  Surgeon: Shane Ann MD;  Location:                [facility] OR;  Service: General  No date: CORONARY ANGIOPLASTY  7/6/2019: CORONARY ARTERY BYPASS GRAFT; N/A      Comment:  Procedure: CABG X2 WITH LIMA, LEFT LEG OVH, AND                PLACEMENT OF LEFT FEMORAL ARTERIAL LINE;  Surgeon: Steven Tang MD;  Location:  [facility] OR;  Service:                Cardiothoracic  No date: CORONARY STENT PLACEMENT      Comment:  x 6  12/13/2017: ENDOSCOPIC FUNCTIONAL SINUS SURGERY (FESS);  Bilateral      Comment:  Procedure: PROCEDURE PERFORMED:  Bilateral functional                endoscopic anterior ethmoidectomy with bilateral middle                meatal antrostomy Septoplasty Right kathia bullosa                resection Bilateral inferior turbinate reduction via                Coblation;  Surgeon: Mayank Ibarra MD;  Location:                [facility] OR;  Service:   2018: ENDOSCOPY; N/A      Comment:  Procedure: ESOPHAGOGASTRODUODENOSCOPY WITH ANESTHESIA;                 Surgeon: Benitez Mas MD;  Location: [facility]                ENDOSCOPY;  Service: Gastroenterology  No date: HERNIA REPAIR      Comment:  x2 inguinal area  No date: KIDNEY STONE SURGERY  No date: KNEE SURGERY; Right  No date: OTHER SURGICAL HISTORY      Comment:  urolift  No date: PROSTATE SURGERY      Comment:  Dr. Badillo -   No date: THUMB AMPUTATION; Left      Comment:  partial  No date: TOE AMPUTATION; Right      Comment:  big    Social History    Socioeconomic History      Marital status:       Spouse name: Not on file      Number of children: Not on file      Years of education: Not on file      Highest education level: Not on file    Tobacco Use      Smoking status: Former Smoker        Years: 4.50        Types: Cigarettes        Quit date:         Years since quittin.6      Smokeless tobacco: Former User        Types: Chew        Quit date:     Substance and Sexual Activity      Alcohol use: No      Drug use: No      Sexual activity: Defer      Family history: reviewed and noncontributory             Review of Systems   All other systems reviewed and are negative.      Past Medical History:   Diagnosis Date   • Arthritis    • Asthma    • Carotid disease, bilateral (CMS/HCC)    • Chest pain    • Chronic ischemic heart disease    • Chronic sinusitis    • Kathia bullosa    • Coronary artery disease    • Deviated septum    • Diabetes mellitus (CMS/HCC)    • Difficulty urinating    •  Diverticulitis    • Enlarged prostate    • Fatty liver    • GERD (gastroesophageal reflux disease)    • Hyperlipidemia    • Hypertension    • Hypertrophy of nasal turbinates    • Keratoderma    • Kidney stone    • Migraine    • Murmur, heart    • Myocardial infarction (CMS/HCC)    • Obesity    • PONV (postoperative nausea and vomiting)    • Psoriasis    • Seizures (CMS/HCC)    • Sinus congestion    • Sleep apnea    • SOB (shortness of breath)    • Stroke (CMS/HCC)    • UTI (urinary tract infection)        Allergies   Allergen Reactions   • Flagyl [Metronidazole] Hives   • Atorvastatin Other (See Comments)     Muscle cramps   • Ciprofloxacin Hives       Past Surgical History:   Procedure Laterality Date   • CARDIAC CATHETERIZATION  01/2016    Dr. Broadbent; widely patent previously placed stents in the left anterior descending and obstructive disease involving the diagonal branch which was treated medically   • CARDIAC CATHETERIZATION N/A 7/14/2017    Procedure: Left Heart Cath;  Surgeon: Wade Ramey MD;  Location:  [facility];  Service:    • CARDIAC CATHETERIZATION Left 10/15/2018    Procedure: Cardiac Catheterization/Vascular Study;  Surgeon: Wade Ramey MD;  Location:  [facility];  Service: Cardiology   • CARDIAC CATHETERIZATION  10/15/2018    Procedure: Functional Flow [place];  Surgeon: Wade Ramey MD;  Location:  [facility] INV[facility];  Service: Cardiology   • CARDIAC CATHETERIZATION N/A 10/15/2018    Procedure: Left ventriculography;  Surgeon: Wade Ramey MD;  Location:  [facility];  Service: Cardiology   • CARDIAC CATHETERIZATION Left 6/26/2019    Procedure: Cardiac Catheterization/Vascular Study VEL OK  HE WILL WAIT 1 YEAR FOR SHOULDER SURGERY ;  Surgeon: Wade Ramey MD;  Location:  [facility];  Service: Cardiology   • CHOLECYSTECTOMY     • CHOLECYSTECTOMY WITH INTRAOPERATIVE CHOLANGIOGRAM N/A 8/1/2018    Procedure:  CHOLECYSTECTOMY LAPAROSCOPIC INTRAOPERATIVE CHOLANGIOGRAM;  Surgeon: Shane Ann MD;  Location:  [facility] OR;  Service: General   • CORONARY ANGIOPLASTY     • CORONARY ARTERY BYPASS GRAFT N/A 2019    Procedure: CABG X2 WITH LIMA, LEFT LEG OVH, AND PLACEMENT OF LEFT FEMORAL ARTERIAL LINE;  Surgeon: Steven Tang MD;  Location:  [facility] OR;  Service: Cardiothoracic   • CORONARY STENT PLACEMENT      x 6   • ENDOSCOPIC FUNCTIONAL SINUS SURGERY (FESS) Bilateral 2017    Procedure: PROCEDURE PERFORMED:  Bilateral functional endoscopic anterior ethmoidectomy with bilateral middle meatal antrostomy Septoplasty Right kathia bullosa resection Bilateral inferior turbinate reduction via Coblation;  Surgeon: Mayank Ibarra MD;  Location:  [facility] OR;  Service:    • ENDOSCOPY N/A 2018    Procedure: ESOPHAGOGASTRODUODENOSCOPY WITH ANESTHESIA;  Surgeon: Benitez Mas MD;  Location: [facility] ENDOSCOPY;  Service: Gastroenterology   • HERNIA REPAIR      x2 inguinal area   • KIDNEY STONE SURGERY     • KNEE SURGERY Right    • OTHER SURGICAL HISTORY      urolift   • PROSTATE SURGERY      Dr. Badillo -    • THUMB AMPUTATION Left     partial   • TOE AMPUTATION Right     big       Family History   Problem Relation Age of Onset   • Heart disease Father    • No Known Problems Mother        Social History     Socioeconomic History   • Marital status:      Spouse name: Not on file   • Number of children: Not on file   • Years of education: Not on file   • Highest education level: Not on file   Tobacco Use   • Smoking status: Former Smoker     Years: 4.50     Types: Cigarettes     Last attempt to quit:      Years since quittin.6   • Smokeless tobacco: Former User     Types: Chew     Quit date:    Substance and Sexual Activity   • Alcohol use: No   • Drug use: No   • Sexual activity: Defer           Objective   Physical Exam   Constitutional: He appears well-developed and well-nourished.   HENT:    Head: Normocephalic.   Mouth/Throat: Oropharynx is clear and moist. No oropharyngeal exudate or posterior oropharyngeal edema.   Eyes: EOM are normal. Pupils are equal, round, and reactive to light.   Neck: Normal range of motion. Neck supple. No JVD present.   Cardiovascular: Normal rate and regular rhythm.   Pulmonary/Chest: Effort normal. No accessory muscle usage. No respiratory distress.   Abdominal: Bowel sounds are normal. He exhibits no distension, no ascites and no mass. There is no tenderness. There is no rebound.   Musculoskeletal:        Right lower leg: Normal.        Left lower leg: Normal.   Neurological: He is alert.   Skin: Skin is warm. Capillary refill takes less than 2 seconds.   Psychiatric: He has a normal mood and affect. His behavior is normal.   Vitals reviewed.      ECG 12 Lead    Date/Time: 9/7/2019 10:21 PM  Performed by: Ramón Avery MD  Authorized by: Ramón Avery MD   Interpreted by physician  Comparison: compared with previous ECG   Similar to previous ECG  Rhythm: sinus rhythm  Rate: normal  BPM: 79  QRS axis: normal  Conduction: 1st degree    ECG 12 Lead    Date/Time: 9/7/2019 2:47 AM  Performed by: Ramón Avery MD  Authorized by: Ramón Avery MD   Interpreted by physician  Rhythm: sinus rhythm  Rate: normal  BPM: 76  QRS axis: normal  Conduction: 1st degree                 ED Course  ED Course as of Sep 07 0406   Sat Sep 07, 2019   0402 CT shows no PE but bilateral moderate pleural effusions, pulmonary edema.   [JH]   0403 He has diastolic dysfunction.   [JH]   0404 Given hypoxia and pulmonary edema will provide lasix and admit.   []      ED Course User Index  [JH] Ramón Avery MD      XR Chest 1 View    (Results Pending)   CT Angiogram Chest With Contrast    (Results Pending)     Labs Reviewed   COMPREHENSIVE METABOLIC PANEL - Abnormal; Notable for the following components:       Result Value    BUN 24 (*)     CO2 22.0 (*)     ALT  (SGPT) 87 (*)     AST (SGOT) 87 (*)     eGFR Non  Amer 59 (*)     All other components within normal limits    Narrative:     GFR Normal >60  Chronic Kidney Disease <60  Kidney Failure <15   CBC WITH AUTO DIFFERENTIAL - Abnormal; Notable for the following components:    WBC 10.82 (*)     MCHC 31.1 (*)     Neutrophils, Absolute 7.54 (*)     All other components within normal limits   PROTIME-INR - Abnormal; Notable for the following components:    Protime 15.7 (*)     INR 1.21 (*)     All other components within normal limits   D-DIMER, QUANTITATIVE - Abnormal; Notable for the following components:    D-Dimer, Quantitative 2.01 (*)     All other components within normal limits    Narrative:     Reference Range is 0-0.50 mg/L FEU. However, results <0.50 mg/L FEU tends to rule out DVT or PE. Results >0.50 mg/L FEU are not useful in predicting absence or presence of DVT or PE.   BLOOD GAS, ARTERIAL - Abnormal; Notable for the following components:    pO2, Arterial 68.0 (*)     Base Excess, Arterial -2.7 (*)     O2 Saturation, Arterial 90.3 (*)     All other components within normal limits   TROPONIN (IN-HOUSE) - Normal   TROPONIN (IN-HOUSE) - Normal   APTT - Normal   BNP (IN-HOUSE) - Normal   TROPONIN (IN-HOUSE) - Normal   RAINBOW DRAW    Narrative:     The following orders were created for panel order [place] Draw.  Procedure                               Abnormality         Status                     ---------                               -----------         ------                     Light Blue Top[[id number]]                                   Final result               Green Top (Gel)[[id number]]                                  Final result               Lavender Top[[id number]]                                     Final result               Red Top[[id number]]                                          Final result                 Please view results for these tests on the individual orders.   RAINBOW DRAW     Narrative:     The following orders were created for panel order [place] Draw.  Procedure                               Abnormality         Status                     ---------                               -----------         ------                     Light Blue Top[[id number]]                                                              Green Top (Gel)[[id number]]                                  Final result               Lavender Top[[id number]]                                     Final result               Red Top[[id number]]                                                                       Please view results for these tests on the individual orders.   BLOOD GAS, ARTERIAL   LIGHT BLUE TOP   GREEN TOP   LAVENDER TOP   RED TOP   CBC AND DIFFERENTIAL    Narrative:     The following orders were created for panel order CBC & Differential.  Procedure                               Abnormality         Status                     ---------                               -----------         ------                     CBC Auto Differential[[id number]]        Abnormal            Final result                 Please view results for these tests on the individual orders.   GREEN TOP   LAVENDER TOP             · Left ventricular wall thickness is consistent with moderate concentric hypertrophy.  · Estimated EF = 65%.  · Left ventricular diastolic dysfunction.  · Mild to moderate aortic valve regurgitation is present.  · Mild aortic valve stenosis is present. Aortic valve leaflets not well visualized  · No evidence of pulmonary hypertension is present.               MDM    Final diagnoses:   Acute congestive heart failure, unspecified heart failure type (CMS/[facility])   Hypoxia              Ramón Avery MD  09/07/19 0401

## 2019-09-07 NOTE — CONSULTS
[facility] HEART GROUP CONSULT NOTE    Referring Provider: Vish Latham MD    Reason for Consultation:  Acute heart failure?    Chief complaint:   Chief Complaint   Patient presents with   • Shortness of Breath   • Chest Pain       Subjective .     History of present illness:  Carlos A Boyer Jr. is a 59 y.o. male with history of CAD, CABG, HTN, HLD, DM and had short term post op AF and atrial flutter. He was discharged from our service after a successful cardioversion. He presented to the ER early this morning with complaints of productive cough with yellow sputum, orthopnea and LE swelling. He states the symptoms resolved after one dose of Lasix. He also complained of epigastric pain that was relieved with Nitro. On arrival ALT and AST elevated at 87, troponin normal, BNP normal, WBC have doubled from 5.65 to 10.82 d-dimer 2.01. CXR completed 9/5 and yesterday evening after arrival shows no acute findings. CTA of chest today shows edema or infection with moderate bilateral pleural effusions. LFTs are increased again today at 109/100.       History  Past Medical History:   Diagnosis Date   • Arthritis    • Asthma    • Carotid disease, bilateral (CMS/HCC)    • Chest pain    • Chronic ischemic heart disease    • Chronic sinusitis    • Maribell bullosa    • Coronary artery disease    • Deviated septum    • Diabetes mellitus (CMS/HCC)    • Difficulty urinating    • Diverticulitis    • Enlarged prostate    • Fatty liver    • GERD (gastroesophageal reflux disease)    • Hyperlipidemia    • Hypertension    • Hypertrophy of nasal turbinates    • Keratoderma    • Kidney stone    • Migraine    • Murmur, heart    • Myocardial infarction (CMS/HCC)    • Obesity    • PONV (postoperative nausea and vomiting)    • Psoriasis    • Seizures (CMS/HCC)    • Sinus congestion    • Sleep apnea    • SOB (shortness of breath)    • Stroke (CMS/HCC)    • UTI (urinary tract infection)    ,   Past Surgical History:   Procedure  Laterality Date   • CARDIAC CATHETERIZATION  01/2016    Dr. Broadbent; widely patent previously placed stents in the left anterior descending and obstructive disease involving the diagonal branch which was treated medically   • CARDIAC CATHETERIZATION N/A 7/14/2017    Procedure: Left Heart Cath;  Surgeon: Wade Ramey MD;  Location:  [facility] CATH INVASIVE LOCATION;  Service:    • CARDIAC CATHETERIZATION Left 10/15/2018    Procedure: Cardiac Catheterization/Vascular Study;  Surgeon: Wade Ramey MD;  Location:  [facility] CATH INVAS[facility] LOC[facility];  Service: Cardiology   • CARDIAC CATHETERIZATION  10/15/2018    Procedure: Functional Flow [place];  Surgeon: Wade Ramey MD;  Location:  [facility] CATH INVASIVE [facility];  Service: Cardiology   • CARDIAC CATHETERIZATION N/A 10/15/2018    Procedure: Left ventriculography;  Surgeon: Wade Ramey MD;  Location:  [facility] CATH INVASIVE LOC[facility];  Service: Cardiology   • CARDIAC CATHETERIZATION Left 6/26/2019    Procedure: Cardiac Catheterization/Vascular Study VEL OK  HE WILL WAIT 1 YEAR FOR SHOULDER SURGERY ;  Surgeon: Wade Ramey MD;  Location:  [facility] CATH INVASIVE LOC[facility];  Service: Cardiology   • CHOLECYSTECTOMY     • CHOLECYSTECTOMY WITH INTRAOPERATIVE CHOLANGIOGRAM N/A 8/1/2018    Procedure: CHOLECYSTECTOMY LAPAROSCOPIC INTRAOPERATIVE CHOLANGIOGRAM;  Surgeon: Shane Ann MD;  Location:  [facility] OR;  Service: General   • CORONARY ANGIOPLASTY     • CORONARY ARTERY BYPASS GRAFT N/A 7/6/2019    Procedure: CABG X2 WITH LIMA, LEFT LEG OVH, AND PLACEMENT OF LEFT FEMORAL ARTERIAL LINE;  Surgeon: Steven Tang MD;  Location:  [facility] OR;  Service: Cardiothoracic   • CORONARY STENT PLACEMENT      x 6   • ENDOSCOPIC FUNCTIONAL SINUS SURGERY (FESS) Bilateral 12/13/2017    Procedure: PROCEDURE PERFORMED:  Bilateral functional endoscopic anterior ethmoidectomy with bilateral middle meatal antrostomy Septoplasty Right kathia bullosa resection Bilateral inferior turbinate reduction via  Coblation;  Surgeon: Mayank Ibarra MD;  Location: [facility] OR;  Service:    • ENDOSCOPY N/A 2018    Procedure: ESOPHAGOGASTRODUODENOSCOPY WITH ANESTHESIA;  Surgeon: Benitez Mas MD;  Location: [facility] ENDOSCOPY;  Service: Gastroenterology   • HERNIA REPAIR      x2 inguinal area   • KIDNEY STONE SURGERY     • KNEE SURGERY Right    • OTHER SURGICAL HISTORY      urolift   • PROSTATE SURGERY      Dr. Badillo -    • THUMB AMPUTATION Left     partial   • TOE AMPUTATION Right     big   ,   Family History   Problem Relation Age of Onset   • Heart disease Father    • COPD Mother    • Hypertension Mother    • Asthma Mother    • No Known Problems Sister    • Prostate cancer Maternal Grandfather    • No Known Problems Sister    ,   Social History     Tobacco Use   • Smoking status: Former Smoker     Packs/day: 1.50     Years: 4.50     Pack years: 6.75     Types: Cigarettes, Cigars     Last attempt to quit:      Years since quittin.6   • Smokeless tobacco: Former User     Types: Chew     Quit date:    Substance Use Topics   • Alcohol use: No   • Drug use: No   ,     Medications    Prior to Admission medications    Medication Sig Start Date End Date Taking? Authorizing Provider   albuterol (PROVENTIL HFA;VENTOLIN HFA) 108 (90 BASE) MCG/ACT inhaler Inhale 2 puffs Every 6 (Six) Hours As Needed for wheezing.    Rohan Christina MD   amiodarone (PACERONE) 200 MG tablet Take 1 tablet by mouth 2 (Two) Times a Day. 19   Azucena Acosta APRN   apixaban (ELIQUIS) 5 MG tablet tablet Take 1 tablet by mouth Every 12 (Twelve) Hours. 19   Ananya Avila APRN   aspirin 81 MG chewable tablet Chew 81 mg Daily.    Rohan Christina MD   calcium polycarbophil (FIBERCON) 625 MG tablet Take 625 mg by mouth Daily.    Rohan Christina MD   carboxymethylcellulose (REFRESH PLUS) 0.5 % solution Administer 1 drop to both eyes 4 (Four) Times a Day As Needed for Dry Eyes.    Rohan Christina MD    Empagliflozin (JARDIANCE) 10 MG tablet Take 1 tablet by mouth Daily.    Rohan Christina MD   fluticasone (FLONASE) 50 MCG/ACT nasal spray 2 sprays into each nostril Daily.    Rohan Christina MD   gabapentin (NEURONTIN) 100 MG capsule Take 1 capsule by mouth Every Night. 9/21/18 9/21/19  Usha Hammer APRN   insulin aspart (novoLOG FLEXPEN) 100 UNIT/ML solution pen-injector sc pen Inject 30 Units under the skin into the appropriate area as directed Every Morning. 34 units  at breakfast, 30 units at lunch, 34 units hs    Rohan Christina MD   Insulin Glargine (LANTUS SOLOSTAR) 100 UNIT/ML injection pen Inject 60-90 Units under the skin into the appropriate area as directed 2 (Two) Times a Day. 65 units QAM AND 70 units QPM    Rohan Christina MD   lamoTRIgine (LaMICtal) 200 MG tablet Take 1 tablet by mouth 2 (Two) Times a Day. 7/22/19   Usha Hammer APRN   levETIRAcetam (KEPPRA) 500 MG tablet Take 3 tablets every morning, take 4 tablets every night.  Patient taking differently: 500 mg Take As Directed. Take 3 tablets every morning, take 4 tablets every night. 7/22/19   Usha Hammer APRN   Liraglutide (VICTOZA SC) Inject 1.2 mg under the skin into the appropriate area as directed Every Night. 1.2    Rohan Christina MD   metFORMIN (GLUCOPHAGE) 1000 MG tablet Take 1 tablet by mouth 2 (Two) Times a Day With Meals. HOLD x 48 hours post contrast. May resume 3/18/18 3/16/18   Meghan Salazar APRN   metoprolol tartrate (LOPRESSOR) 50 MG tablet Take 25 mg by mouth 2 (Two) Times a Day.    Rohan Christina MD   Multiple Vitamin (MULTI VITAMIN PO) Take 1 tablet by mouth Daily.    Rohan Christina MD   mupirocin (BACTROBAN) 2 % ointment Apply 1 application topically to the appropriate area as directed 3 (Three) Times a Day.    Rohan Christina MD   nitroglycerin (NITROSTAT) 0.4 MG SL tablet Place 0.4 mg under the tongue Every 5 (Five) Minutes As Needed for chest pain.  Take no more than 3 doses in 15 minutes.    ProviderRohan MD   ondansetron ODT (ZOFRAN-ODT) 4 MG disintegrating tablet Take 1 tablet by mouth Every 4 (Four) Hours As Needed for Nausea or Vomiting. 5/30/19   Andriy Bo Jr., MD   pantoprazole (PROTONIX) 40 MG EC tablet Take 40 mg by mouth 2 (Two) Times a Day.    Rohan Christina MD   psyllium (METAMUCIL) 58.6 % packet Take 1 packet by mouth Daily As Needed (constipation).    Rohan Christina MD   rosuvastatin (CRESTOR) 40 MG tablet Take 20 mg by mouth Every Night.    Rohan Christina MD   urea (CARMOL) 40 % ointment Apply  topically to the appropriate area as directed As Needed for Dry Skin.    ProviderRohan MD       Current Facility-Administered Medications   Medication Dose Route Frequency Provider Last Rate Last Dose   • albuterol (PROVENTIL) nebulizer solution 0.083% 2.5 mg/3mL  2.5 mg Nebulization Q6H PRN Vish Latham MD       • amiodarone (PACERONE) tablet 200 mg  200 mg Oral BID Vish Latham MD   200 mg at 09/07/19 0905   • apixaban (ELIQUIS) tablet 5 mg  5 mg Oral Q12H Vish Latham MD   5 mg at 09/07/19 0905   • aspirin chewable tablet 81 mg  81 mg Oral Daily Vish Latham MD       • calcium polycarbophil (FIBERCON) tablet 625 mg  625 mg Oral Daily Vish Latham MD   625 mg at 09/07/19 0906   • famotidine (PEPCID) injection 20 mg  20 mg Intravenous BID Vish Latham MD   20 mg at 09/07/19 0907   • furosemide (LASIX) injection 40 mg  40 mg Intravenous Q12H Vish Latham MD       • gabapentin (NEURONTIN) capsule 100 mg  100 mg Oral Nightly Vish Latham MD       • insulin detemir (LEVEMIR) injection 30 Units  30 Units Subcutaneous Q12H Vish Latham MD       • lamoTRIgine (LaMICtal) tablet 200 mg  200 mg Oral BID Vish Latham MD   200 mg at 09/07/19 0905   • levETIRAcetam (KEPPRA) tablet 1,500 mg  1,500 mg Oral Daily Gregor Bell MD       • levETIRAcetam (KEPPRA) tablet 2,000 mg  2,000 mg  "Oral Nightly Gregor Bell MD       • metoprolol tartrate (LOPRESSOR) tablet 25 mg  25 mg Oral BID Vish Latham MD   25 mg at 09/07/19 0906   • nitroglycerin (NITROSTAT) SL tablet 0.4 mg  0.4 mg Sublingual Q5 Min PRN Vish Latham MD       • ondansetron (ZOFRAN) injection 4 mg  4 mg Intravenous Q6H PRN Vish Latham MD       • Pharmacy Consult   Does not apply Continuous PRN Gregor Bell MD       • rosuvastatin (CRESTOR) tablet 20 mg  20 mg Oral Nightly Vish Latham MD       • sodium chloride 0.9 % flush 10 mL  10 mL Intravenous Q12H Vish Latham MD       • sodium chloride 0.9 % flush 10 mL  10 mL Intravenous PRN Vish Latham MD           Allergies:  Flagyl [metronidazole]; Atorvastatin; and Ciprofloxacin    Review of Systems  Review of Systems   Constitutional: Negative for chills, diaphoresis, fatigue and unexpected weight change.   HENT: Negative for nosebleeds.    Respiratory: Negative for cough, chest tightness, shortness of breath and wheezing.    Cardiovascular: Negative for chest pain, palpitations and leg swelling.   Gastrointestinal: Negative for anal bleeding, blood in stool, diarrhea and nausea.   Neurological: Negative for dizziness, syncope and light-headedness.       Objective     Physical Exam:  Telemetry: NSR 70  Patient Vitals for the past 24 hrs:   BP Temp Temp src Pulse Resp SpO2 Height Weight   09/07/19 1249 119/85 98.4 °F (36.9 °C) Oral 74 16 98 % -- --   09/07/19 0733 125/86 98.4 °F (36.9 °C) Oral 72 20 94 % -- --   09/07/19 0725 -- -- -- 73 20 94 % -- --   09/07/19 0549 136/89 -- Oral 72 22 94 % 182.9 cm (72\") 106 kg (234 lb 6.4 oz)   09/07/19 0516 126/86 -- -- 72 -- 96 % -- --   09/07/19 0502 143/100 -- -- 77 -- -- -- --   09/07/19 0501 143/100 -- -- 73 -- -- -- --   09/07/19 0032 (!) 131/101 -- -- 77 -- 93 % -- --   09/06/19 2346 135/98 -- -- 77 -- 93 % -- --   09/06/19 2329 -- -- -- 77 -- 94 % -- --   09/06/19 2316 133/95 -- -- 78 -- 95 % -- -- " "  09/06/19 2308 133/95 -- -- 78 -- 95 % -- --   09/06/19 2222 (!) 159/105 97.6 °F (36.4 °C) -- 79 25 90 % 182.9 cm (72\") 111 kg (245 lb)     Physical Exam   Constitutional: He is oriented to person, place, and time. He appears well-developed and well-nourished. No distress.   HENT:   Head: Normocephalic.   Mouth/Throat: No oropharyngeal exudate.   Eyes: Conjunctivae and EOM are normal. Pupils are equal, round, and reactive to light. No scleral icterus.   Neck: Normal range of motion. Neck supple.   Cardiovascular: Normal rate, regular rhythm, normal heart sounds and intact distal pulses.   No murmur heard.  Pulmonary/Chest: Effort normal and breath sounds normal. No respiratory distress. He has no wheezes. He has no rales. He exhibits no tenderness.   Abdominal: Soft. Bowel sounds are normal. He exhibits no distension. There is no tenderness.   Musculoskeletal: Normal range of motion. He exhibits no edema.   Neurological: He is alert and oriented to person, place, and time. He has normal reflexes.   Skin: Skin is warm and dry. No rash noted. He is not diaphoretic. No erythema. No pallor.   Psychiatric: He has a normal mood and affect. His behavior is normal.   Vitals reviewed.      Results Review:   I reviewed the patient's new clinical results.    Lab Results (last 72 hours)     Procedure Component Value Units Date/Time    POC Glucose Once [[id number]]  (Abnormal) Collected:  09/07/19 1127    Specimen:  Blood Updated:  09/07/19 1138     Glucose 187 mg/dL      Comment: : [id number] Kc ID: [id number]       Hemoglobin A1c [[id number]]  (Abnormal) Collected:  09/07/19 0642    Specimen:  Blood Updated:  09/07/19 0858     Hemoglobin A1C 6.5 %     Narrative:       Less than 6.0           Non-Diabetic Range  6.0-7.0                 ADA Therapeutic Target  Greater than 7.0        Action Suggested    TSH [[id number]]  (Normal) Collected:  09/07/19 0642    Specimen:  Blood Updated:  09/07/19 0757     TSH 2.020 " uIU/mL     Lipid Panel [[id number]]  (Abnormal) Collected:  09/07/19 0642    Specimen:  Blood Updated:  09/07/19 0738     Total Cholesterol 120 mg/dL      Triglycerides 74 mg/dL      HDL Cholesterol 36 mg/dL      LDL Cholesterol  74 mg/dL      LDL/HDL Ratio 1.92    Comprehensive Metabolic Panel [[id number]]  (Abnormal) Collected:  09/07/19 0642    Specimen:  Blood Updated:  09/07/19 0727     Glucose 89 mg/dL      BUN 24 mg/dL      Creatinine 1.19 mg/dL      Sodium 142 mmol/L      Potassium 4.4 mmol/L      Chloride 106 mmol/L      CO2 24.0 mmol/L      Calcium 10.1 mg/dL      Total Protein 8.0 g/dL      Albumin 4.70 g/dL      ALT (SGPT) 109 U/L      AST (SGOT) 100 U/L      Alkaline Phosphatase 104 U/L      Total Bilirubin 1.3 mg/dL      eGFR Non African Amer 62 mL/min/1.73      Globulin 3.3 gm/dL      A/G Ratio 1.4 g/dL      BUN/Creatinine Ratio 20.2     Anion Gap 12.0 mmol/L     Narrative:       GFR Normal >60  Chronic Kidney Disease <60  Kidney Failure <15    CBC Auto Differential [[id number]]  (Abnormal) Collected:  09/07/19 0642    Specimen:  Blood Updated:  09/07/19 0723     WBC 10.30 10*3/mm3      RBC 4.90 10*6/mm3      Hemoglobin 13.1 g/dL      Hematocrit 41.4 %      MCV 84.5 fL      MCH 26.7 pg      MCHC 31.6 g/dL      RDW 13.8 %      RDW-SD 42.8 fl      MPV 12.0 fL      Platelets 192 10*3/mm3      Neutrophil % 76.4 %      Lymphocyte % 15.4 %      Monocyte % 7.1 %      Eosinophil % 0.4 %      Basophil % 0.4 %      Immature Grans % 0.3 %      Neutrophils, Absolute 7.87 10*3/mm3      Lymphocytes, Absolute 1.59 10*3/mm3      Monocytes, Absolute 0.73 10*3/mm3      Eosinophils, Absolute 0.04 10*3/mm3      Basophils, Absolute 0.04 10*3/mm3      Immature Grans, Absolute 0.03 10*3/mm3      nRBC 0.0 /100 WBC     Troponin [[id number]]  (Normal) Collected:  09/07/19 0248    Specimen:  Blood Updated:  09/07/19 0322     Troponin I 0.020 ng/mL     [place] Draw [[id number]] Collected:  09/07/19 0010    Specimen:  Blood Updated:   09/07/19 0141    Narrative:       The following orders were created for panel order [place] Draw.  Procedure                               Abnormality         Status                     ---------                               -----------         ------                     Light Blue Top[[id number]]                                                              Green Top (Gel)[[id number]]                                  Final result               Lavender Top[[id number]]                                     Final result               Red Top[[id number]]                                                                       Please view results for these tests on the individual orders.    Green Top (Gel) [[id number]] Collected:  09/07/19 0010    Specimen:  Blood Updated:  09/07/19 0121     Extra Tube Hold for add-ons.     Comment: Auto resulted.       Lavender Top [[id number]] Collected:  09/07/19 0010    Specimen:  Blood Updated:  09/07/19 0121     Extra Tube hold for add-on     Comment: Auto resulted       Blood Gas, Arterial [[id number]]  (Abnormal) Collected:  09/07/19 0100    Specimen:  Arterial Blood Updated:  09/07/19 0108     Site Right Brachial     Thien's Test N/A     pH, Arterial 7.390 pH units      pCO2, Arterial 36.0 mm Hg      pO2, Arterial 68.0 mm Hg      Comment: 84 Value below reference range        HCO3, Arterial 21.8 mmol/L      Base Excess, Arterial -2.7 mmol/L      Comment: 84 Value below reference range        O2 Saturation, Arterial 90.3 %      Comment: 84 Value below reference range        Temperature 37.0 C      Barometric Pressure for Blood Gas 751 mmHg      Modality Nasal Cannula     Flow Rate 2.0 lpm      Ventilator Mode NA     Collected by [id number]     Comment: Meter: [id number]     :  [id number]       aPTT [[id number]]  (Normal) Collected:  09/06/19 2239    Specimen:  Blood Updated:  09/07/19 0042     PTT 27.1 seconds     Protime-INR [[id number]]  (Abnormal) Collected:  09/06/19 2239     Specimen:  Blood Updated:  09/07/19 0042     Protime 15.7 Seconds      INR 1.21    D-dimer, Quantitative [[id number]]  (Abnormal) Collected:  09/06/19 2239    Specimen:  Blood Updated:  09/07/19 0042     D-Dimer, Quantitative 2.01 mg/L (FEU)     Narrative:       Reference Range is 0-0.50 mg/L FEU. However, results <0.50 mg/L FEU tends to rule out DVT or PE. Results >0.50 mg/L FEU are not useful in predicting absence or presence of DVT or PE.    BNP [[id number]]  (Normal) Collected:  09/07/19 0010    Specimen:  Blood Updated:  09/07/19 0040     proBNP 732.0 pg/mL     Troponin [[id number]]  (Normal) Collected:  09/07/19 0010    Specimen:  Blood Updated:  09/07/19 0038     Troponin I 0.019 ng/mL     Comprehensive Metabolic Panel [[id number]]  (Abnormal) Collected:  09/07/19 0010    Specimen:  Blood Updated:  09/07/19 0036     Glucose 95 mg/dL      BUN 24 mg/dL      Creatinine 1.24 mg/dL      Sodium 140 mmol/L      Potassium 5.1 mmol/L      Chloride 109 mmol/L      CO2 22.0 mmol/L      Calcium 9.7 mg/dL      Total Protein 7.4 g/dL      Albumin 4.30 g/dL      ALT (SGPT) 87 U/L      AST (SGOT) 87 U/L      Alkaline Phosphatase 87 U/L      Total Bilirubin 0.9 mg/dL      eGFR Non African Amer 59 mL/min/1.73      Globulin 3.1 gm/dL      A/G Ratio 1.4 g/dL      BUN/Creatinine Ratio 19.4     Anion Gap 9.0 mmol/L     Narrative:       GFR Normal >60  Chronic Kidney Disease <60  Kidney Failure <15    [place] Draw [[id number]] Collected:  09/06/19 2239    Specimen:  Blood Updated:  09/06/19 2345    Narrative:       The following orders were created for panel order [place] Draw.  Procedure                               Abnormality         Status                     ---------                               -----------         ------                     Light Blue Top[[id number]]                                   Final result               Green Top (Gel)[[id number]]                                  Final result               Lavender  Top[[id number]]                                     Final result               Red Top[[id number]]                                          Final result                 Please view results for these tests on the individual orders.    Light Blue Top [[id number]] Collected:  09/06/19 2239    Specimen:  Blood Updated:  09/06/19 2345     Extra Tube hold for add-on     Comment: Auto resulted       Green Top (Gel) [[id number]] Collected:  09/06/19 2239    Specimen:  Blood Updated:  09/06/19 2345     Extra Tube Hold for add-ons.     Comment: Auto resulted.       Lavender Top [[id number]] Collected:  09/06/19 2239    Specimen:  Blood Updated:  09/06/19 2345     Extra Tube hold for add-on     Comment: Auto resulted       Red Top [[id number]] Collected:  09/06/19 2239    Specimen:  Blood Updated:  09/06/19 2345     Extra Tube Hold for add-ons.     Comment: Auto resulted.       Troponin [[id number]]  (Normal) Collected:  09/06/19 2239    Specimen:  Blood Updated:  09/06/19 2337     Troponin I 0.020 ng/mL     CBC & Differential [[id number]] Collected:  09/06/19 2239    Specimen:  Blood Updated:  09/06/19 2320    Narrative:       The following orders were created for panel order CBC & Differential.  Procedure                               Abnormality         Status                     ---------                               -----------         ------                     CBC Auto Differential[[id number]]        Abnormal            Final result                 Please view results for these tests on the individual orders.    CBC Auto Differential [[id number]]  (Abnormal) Collected:  09/06/19 2239    Specimen:  Blood Updated:  09/06/19 2320     WBC 10.82 10*3/mm3      RBC 4.92 10*6/mm3      Hemoglobin 13.2 g/dL      Hematocrit 42.4 %      MCV 86.2 fL      MCH 26.8 pg      MCHC 31.1 g/dL      RDW 13.8 %      RDW-SD 43.4 fl      MPV 11.9 fL      Platelets 209 10*3/mm3      Neutrophil % 69.6 %      Lymphocyte % 21.7 %      Monocyte % 6.3 %       Eosinophil % 1.7 %      Basophil % 0.4 %      Immature Grans % 0.3 %      Neutrophils, Absolute 7.54 10*3/mm3      Lymphocytes, Absolute 2.35 10*3/mm3      Monocytes, Absolute 0.68 10*3/mm3      Eosinophils, Absolute 0.18 10*3/mm3      Basophils, Absolute 0.04 10*3/mm3      Immature Grans, Absolute 0.03 10*3/mm3      nRBC 0.0 /100 WBC           Lab Results   Component Value Date    ECHOEFEST 60 07/07/2019       Imaging Results (last 72 hours)     Procedure Component Value Units Date/Time    CT Angiogram Chest With Contrast [[id number]] Collected:  09/07/19 0900     Updated:  09/07/19 0913    Narrative:       EXAM: CT ANGIOGRAM CHEST W CONTRAST- - 9/7/2019 2:27 AM CDT     HISTORY: elevated dimer, sob       COMPARISON: 08/30/2019.      DOSE LENGTH PRODUCT: 477 mGy cm. Automatic exposure control was utilized  to make radiation dose as low as reasonably achievable.     TECHNIQUE: Enhanced axial CT images obtained with multiplanar reformats.  3D postprocessing, including MIPs, performed and images saved to PACS.     FINDINGS:   AIRWAYS/PULMONARY PARENCHYMA: Main airways patent. Diffuse thickening of  the small airways with patchy groundglass opacities, greatest at the  lung bases. Some of the groundglass opacities appear nodular. For  instance, a 7 mm groundglass pulmonary nodule in the right upper lobe on  axial series 5, image 49. No dense focal consolidation. Small to  moderate bilateral pleural effusions.     VESSELS: Contrast bolus is adequate. No pulmonary artery filling defect  to suggest pulmonary embolus. Aorta normal in course and caliber.       CARDIAC:  Borderline heart size. No pericardial effusion.  Coronary  artery densities, suspect calcifications with stent.      MEDIASTINUM: Small mediastinal lymph nodes. No mediastinal adenopathy.  Esophagus has normal coarse, caliber and wall thickness.     EXTRATHORACIC: The visualized portions of the thyroid gland are  unremarkable. No thoracic inlet or axillary  "adenopathy. The  extrathoracic soft tissues are within normal limits.      INCLUDED UPPER ABDOMEN: The visualized portions of the upper abdomen are  within normal limits.     OSSEOUS: Degenerative changes in the visualized spine. Hemangioma in the  lower thoracic vertebral body. Sternotomy wires.           Impression:       1. Patchy bilateral groundglass opacities with diffuse thickening of the  small airways, most likely edema or infection. Moderate bilateral  pleural effusions. Some of the opacities are nodular; consider follow-up  chest CT in 3-6 months per Fleischner criteria.  2. Borderline heart size. Coronary artery densities likely calcified  atherosclerosis with stent.     Preliminary interpretation performed by [facility] and I agree with the  preliminary report.   Exam was tagged in PACS with \"new lung findings\" to assist in  appropriate follow up.   This report was finalized on 09/07/2019 09:10 by Dr Mari Melton MD.    XR Chest 1 View [[id number]] Collected:  09/07/19 0855     Updated:  09/07/19 0903    Narrative:       EXAM: XR CHEST 1 VW- - 9/6/2019 11:17 PM CDT     HISTORY: Chest Pain Triage Protocol       COMPARISON: 09/05/2019.      TECHNIQUE:  1 images.  Frontal view of the chest.     FINDINGS:    No pneumothorax, pleural effusion or focal consolidation. Prominent  cardiac silhouette. Upper mediastinum within normal limits. The  previously demonstrated sternotomy wires are not evident on this exam,  but the appearance of the chest is otherwise similar compared to priors  and nonvisualization of the sternotomy wires is likely to be due to  technique.          Impression:       1. No acute cardiopulmonary finding. Similar prominent cardiac  silhouette.  This report was finalized on 09/07/2019 09:00 by Dr Mari Melton MD.        Assessment/Plan       Acute congestive heart failure (CMS/HCC)    Acute pulmonary edema (CMS/HCC)    Plan:  -LFTs elevated. Will stop amio and crestor until normalize.  " Recheck LFT tomorrow  -patient does not appear to be volume overloaded. Will stop IV lasix and start PO in am.   -monitor telemetry, daily weights, strict I&O.     Further orders per Dr. Sue who is covering for Dr. Ramey.     Thank you for asking us to follow this patient with you.     TEENA Barahona  09/07/19  12:53 PM    Please note this cardiology consultation note is the result of a face to face consultation with the patient, in addition to reviewing medical records at length by myself, TEENA Goncalves      Time: approximately 45 minutes

## 2019-09-07 NOTE — PROGRESS NOTES
Patient was recently discharged from cardiology in September 6.  He was admitted on September 5 due to arrhythmia (atrial flutter with rapid ventricular response of 122 150).  Patient reportedly received 20 mg bolus of Cardizem.  Ischemic work-up for chest pain showed negative enzymes and no EKG changes to suggest ischemia.  Patient underwent CARLOS/cardioversion on September 6 by Dr. Llamas.  After 1 biphasic shock of 50 J he converted to normal sinus rhythm.  He was discharge on same day and returned on same day with shortness of breath accompanied by epigastric fullness/nausea.  He was maintaining in sinus rhythm.  He was described to be in respiratory distress on 2 L oxygen.  His blood gas showed normal pH of 7.39, PCO2 36, PO2 of 68 on 2 L nasal cannula.  He underwent CT angiogram of the chest that showed no pulmonary embolus, no aortic aneurysm or dissection, no pneumothorax, bilateral moderate pleural effusion, pulmonary edema and large heart, no pericardial effusion.  No fracture.  This were all new compared to August 30, 2019 CT angiogram of the chest.    He has known history of coronary artery bypass graft in July 2019 by Dr. Tang.    He was sent home on the following medication in September 6      BNP is now 732 compared to yesterday's over 1000.  Troponin remains negative.  Lab Results (last 24 hours)     Procedure Component Value Units Date/Time    TSH [[id number]]  (Normal) Collected:  09/07/19 0642    Specimen:  Blood Updated:  09/07/19 0757     TSH 2.020 uIU/mL     Lipid Panel [[id number]]  (Abnormal) Collected:  09/07/19 0642    Specimen:  Blood Updated:  09/07/19 0738     Total Cholesterol 120 mg/dL      Triglycerides 74 mg/dL      HDL Cholesterol 36 mg/dL      LDL Cholesterol  74 mg/dL      LDL/HDL Ratio 1.92    Comprehensive Metabolic Panel [[id number]]  (Abnormal) Collected:  09/07/19 0642    Specimen:  Blood Updated:  09/07/19 0727     Glucose 89 mg/dL      BUN 24 mg/dL      Creatinine 1.19 mg/dL       Sodium 142 mmol/L      Potassium 4.4 mmol/L      Chloride 106 mmol/L      CO2 24.0 mmol/L      Calcium 10.1 mg/dL      Total Protein 8.0 g/dL      Albumin 4.70 g/dL      ALT (SGPT) 109 U/L      AST (SGOT) 100 U/L      Alkaline Phosphatase 104 U/L      Total Bilirubin 1.3 mg/dL      eGFR Non African Amer 62 mL/min/1.73      Globulin 3.3 gm/dL      A/G Ratio 1.4 g/dL      BUN/Creatinine Ratio 20.2     Anion Gap 12.0 mmol/L     Narrative:       GFR Normal >60  Chronic Kidney Disease <60  Kidney Failure <15    CBC Auto Differential [[id number]]  (Abnormal) Collected:  09/07/19 0642    Specimen:  Blood Updated:  09/07/19 0723     WBC 10.30 10*3/mm3      RBC 4.90 10*6/mm3      Hemoglobin 13.1 g/dL      Hematocrit 41.4 %      MCV 84.5 fL      MCH 26.7 pg      MCHC 31.6 g/dL      RDW 13.8 %      RDW-SD 42.8 fl      MPV 12.0 fL      Platelets 192 10*3/mm3      Neutrophil % 76.4 %      Lymphocyte % 15.4 %      Monocyte % 7.1 %      Eosinophil % 0.4 %      Basophil % 0.4 %      Immature Grans % 0.3 %      Neutrophils, Absolute 7.87 10*3/mm3      Lymphocytes, Absolute 1.59 10*3/mm3      Monocytes, Absolute 0.73 10*3/mm3      Eosinophils, Absolute 0.04 10*3/mm3      Basophils, Absolute 0.04 10*3/mm3      Immature Grans, Absolute 0.03 10*3/mm3      nRBC 0.0 /100 WBC     Hemoglobin A1c [[id number]] Collected:  09/07/19 0642    Specimen:  Blood Updated:  09/07/19 0707    Troponin [[id number]]  (Normal) Collected:  09/07/19 0248    Specimen:  Blood Updated:  09/07/19 0322     Troponin I 0.020 ng/mL     [place] Draw [[id number]] Collected:  09/07/19 0010    Specimen:  Blood Updated:  09/07/19 0141    Narrative:       The following orders were created for panel order [place] Draw.  Procedure                               Abnormality         Status                     ---------                               -----------         ------                     Light Blue Top[[id number]]                                                               Green Top (Gel)[[id number]]                                  Final result               Lavender Top[[id number]]                                     Final result               Red Top[[id number]]                                                                       Please view results for these tests on the individual orders.    Green Top (Gel) [[id number]] Collected:  09/07/19 0010    Specimen:  Blood Updated:  09/07/19 0121     Extra Tube Hold for add-ons.     Comment: Auto resulted.       Lavender Top [[id number]] Collected:  09/07/19 0010    Specimen:  Blood Updated:  09/07/19 0121     Extra Tube hold for add-on     Comment: Auto resulted       Blood Gas, Arterial [[id number]]  (Abnormal) Collected:  09/07/19 0100    Specimen:  Arterial Blood Updated:  09/07/19 0108     Site Right Brachial     Thien's Test N/A     pH, Arterial 7.390 pH units      pCO2, Arterial 36.0 mm Hg      pO2, Arterial 68.0 mm Hg      Comment: 84 Value below reference range        HCO3, Arterial 21.8 mmol/L      Base Excess, Arterial -2.7 mmol/L      Comment: 84 Value below reference range        O2 Saturation, Arterial 90.3 %      Comment: 84 Value below reference range        Temperature 37.0 C      Barometric Pressure for Blood Gas 751 mmHg      Modality Nasal Cannula     Flow Rate 2.0 lpm      Ventilator Mode NA     Collected by [id number]     Comment: Meter: [id number]     :  [id number]       aPTT [[id number]]  (Normal) Collected:  09/06/19 2239    Specimen:  Blood Updated:  09/07/19 0042     PTT 27.1 seconds     Protime-INR [[id number]]  (Abnormal) Collected:  09/06/19 2239    Specimen:  Blood Updated:  09/07/19 0042     Protime 15.7 Seconds      INR 1.21    D-dimer, Quantitative [[id number]]  (Abnormal) Collected:  09/06/19 2239    Specimen:  Blood Updated:  09/07/19 0042     D-Dimer, Quantitative 2.01 mg/L (FEU)     Narrative:       Reference Range is 0-0.50 mg/L FEU. However, results <0.50 mg/L FEU tends to rule out DVT or PE.  Results >0.50 mg/L FEU are not useful in predicting absence or presence of DVT or PE.    BNP [[id number]]  (Normal) Collected:  09/07/19 0010    Specimen:  Blood Updated:  09/07/19 0040     proBNP 732.0 pg/mL     Troponin [[id number]]  (Normal) Collected:  09/07/19 0010    Specimen:  Blood Updated:  09/07/19 0038     Troponin I 0.019 ng/mL     Comprehensive Metabolic Panel [[id number]]  (Abnormal) Collected:  09/07/19 0010    Specimen:  Blood Updated:  09/07/19 0036     Glucose 95 mg/dL      BUN 24 mg/dL      Creatinine 1.24 mg/dL      Sodium 140 mmol/L      Potassium 5.1 mmol/L      Chloride 109 mmol/L      CO2 22.0 mmol/L      Calcium 9.7 mg/dL      Total Protein 7.4 g/dL      Albumin 4.30 g/dL      ALT (SGPT) 87 U/L      AST (SGOT) 87 U/L      Alkaline Phosphatase 87 U/L      Total Bilirubin 0.9 mg/dL      eGFR Non African Amer 59 mL/min/1.73      Globulin 3.1 gm/dL      A/G Ratio 1.4 g/dL      BUN/Creatinine Ratio 19.4     Anion Gap 9.0 mmol/L     Narrative:       GFR Normal >60  Chronic Kidney Disease <60  Kidney Failure <15    [place] Draw [[id number]] Collected:  09/06/19 2239    Specimen:  Blood Updated:  09/06/19 2345    Narrative:       The following orders were created for panel order [place] Draw.  Procedure                               Abnormality         Status                     ---------                               -----------         ------                     Light Blue Top[[id number]]                                   Final result               Green Top (Gel)[[id number]]                                  Final result               Lavender Top[[id number]]                                     Final result               Red Top[[id number]]                                          Final result                 Please view results for these tests on the individual orders.    Light Blue Top [[id number]] Collected:  09/06/19 2239    Specimen:  Blood Updated:  09/06/19 2345     Extra Tube hold for add-on      Comment: Auto resulted       Green Top (Gel) [[id number]] Collected:  09/06/19 2239    Specimen:  Blood Updated:  09/06/19 2345     Extra Tube Hold for add-ons.     Comment: Auto resulted.       Lavender Top [[id number]] Collected:  09/06/19 2239    Specimen:  Blood Updated:  09/06/19 2345     Extra Tube hold for add-on     Comment: Auto resulted       Red Top [[id number]] Collected:  09/06/19 2239    Specimen:  Blood Updated:  09/06/19 2345     Extra Tube Hold for add-ons.     Comment: Auto resulted.       Troponin [[id number]]  (Normal) Collected:  09/06/19 2239    Specimen:  Blood Updated:  09/06/19 2337     Troponin I 0.020 ng/mL     CBC & Differential [[id number]] Collected:  09/06/19 2239    Specimen:  Blood Updated:  09/06/19 2320    Narrative:       The following orders were created for panel order CBC & Differential.  Procedure                               Abnormality         Status                     ---------                               -----------         ------                     CBC Auto Differential[[id number]]        Abnormal            Final result                 Please view results for these tests on the individual orders.    CBC Auto Differential [[id number]]  (Abnormal) Collected:  09/06/19 2239    Specimen:  Blood Updated:  09/06/19 2320     WBC 10.82 10*3/mm3      RBC 4.92 10*6/mm3      Hemoglobin 13.2 g/dL      Hematocrit 42.4 %      MCV 86.2 fL      MCH 26.8 pg      MCHC 31.1 g/dL      RDW 13.8 %      RDW-SD 43.4 fl      MPV 11.9 fL      Platelets 209 10*3/mm3      Neutrophil % 69.6 %      Lymphocyte % 21.7 %      Monocyte % 6.3 %      Eosinophil % 1.7 %      Basophil % 0.4 %      Immature Grans % 0.3 %      Neutrophils, Absolute 7.54 10*3/mm3      Lymphocytes, Absolute 2.35 10*3/mm3      Monocytes, Absolute 0.68 10*3/mm3      Eosinophils, Absolute 0.18 10*3/mm3      Basophils, Absolute 0.04 10*3/mm3      Immature Grans, Absolute 0.03 10*3/mm3      nRBC 0.0 /100 WBC           amiodarone 200 mg  Oral BID   apixaban 5 mg Oral Q12H   aspirin 81 mg Oral Daily   calcium polycarbophil 625 mg Oral Daily   famotidine 20 mg Intravenous BID   furosemide 40 mg Intravenous Q12H   gabapentin 100 mg Oral Nightly   insulin detemir 30 Units Subcutaneous Q12H   lamoTRIgine 200 mg Oral BID   levETIRAcetam 500 mg Oral Take As Directed   metoprolol tartrate 25 mg Oral BID   rosuvastatin 20 mg Oral Nightly   sodium chloride 10 mL Intravenous Q12H     Patient is now feeling much better.  He said that he urinated very well.  He has 425 output noted since admission.  no longer requiring oxygen.  Lungs are clear to auscultation with no crackles or wheezes or lagging  No gross edema of lower extremities.  Healing well left leg from grafting from prior coronary artery bypass graft  S1-S2, sinus rhythm by telemetry, regular rate and rhythm    Acute pulmonary edema presenting with shortness of breath and patient cardioverted yesterday for atrial flutter with rapid ventricular response  Coronary artery bypass graft in July 2019  Diabetes mellitus type 2 insulin treated  Hypertension  Obesity  Hyperlipidemia  History of seizure  Neuropathy  History of chronic diastolic heart failure    Courtesy consult from cardiology  Continue on Lasix continue on home medications.  Okay to use insulin regimen from home once verified by pharmacy as discussed with nurse Marianela.  I anticipate that he can go home possibly tomorrow if he remains hemodynamically stable on oral diuretic  Keppra dose change according to his home medication reported as 1500 mg every morning and 2000 mg nightly.    Additional time  (Face to face and review of record) spent greater than 25 mins

## 2019-09-07 NOTE — TELEPHONE ENCOUNTER
"States he had a CARLOS today and went down and looked for a blood clot but there wasn't one. He was in Atrial fibrillation so they also cardioverted him. States approximately 10 minutes ago he started having shortness of breath and vomiting. States while he sitting in front of the fan he feels like he can breathe and is not vomiting currently. Denies chest pain, did take a nitro to see if he would help with breathing. /104 and HR 78. States he is now feeling much better. Denies any symptoms. Instructed wife to watch him and if he started having vomiting or shortness of breath again to come to ER for evaluation. She verbalized understanding.     Reason for Disposition  • Nursing judgment or information in reference    Additional Information  • Negative: Nursing judgment, per information in Reference  • Negative: Information only call about a Well Adult (no illness or injury)  • Negative: Nursing judgment or information in reference  • Negative: Nursing judgment or information in reference  • Negative: Nursing judgment or information in reference  • Negative: Nursing judgment or information in reference  • Negative: Nursing judgment or information in reference  • Negative: Nursing judgment or information in reference  • Negative: Nursing judgment or information in reference  • Negative: Nursing judgment or information in reference  • Negative: Nursing judgment or information in reference  • Negative: Nursing judgment or information in reference  • Negative: Nursing judgment or information in reference  • Negative: Nursing judgment or information in reference  • Negative: Nursing judgment or information in reference    Answer Assessment - Initial Assessment Questions  1. REASON FOR CALL: \"What is your main concern right now?\"      See note  2. ONSET: \"When did the ___ start?\"      See ntoe  3. SEVERITY: \"How bad is the ___?\"      Se note  4. FEVER: \"Do you have a fever?\"      See note  5. OTHER SYMPTOMS: \"Do you " "have any other new symptoms?\"      See note  6. INTERVENTIONS AND RESPONSE: \"What have you done so far to try to make this better? What medications have you used?\"      See note  7. PREGNANCY: \"Is there any chance you are pregnant?\"      n/a    Protocols used: NO GUIDELINE AVAILABLE-ADULT-AH      "

## 2019-09-07 NOTE — PLAN OF CARE
Problem: Patient Care Overview  Goal: Plan of Care Review  Outcome: Ongoing (interventions implemented as appropriate)   09/07/19 0646 09/07/19 2028   Coping/Psychosocial   Plan of Care Reviewed With patient --    Plan of Care Review   Progress no change --    OTHER   Outcome Summary --  VSS. Able to take his own insulin medication. Safety maintained       Problem: Cardiac: Heart Failure (Adult)  Goal: Signs and Symptoms of Listed Potential Problems Will be Absent, Minimized or Managed (Cardiac: Heart Failure)  Outcome: Ongoing (interventions implemented as appropriate)

## 2019-09-07 NOTE — PLAN OF CARE
Problem: Patient Care Overview  Goal: Plan of Care Review  Outcome: Ongoing (interventions implemented as appropriate)   09/07/19 0646   Coping/Psychosocial   Plan of Care Reviewed With patient   Plan of Care Review   Progress no change   OTHER   Outcome Summary Pt admitted with acute CHF exacerbation. VSS. IV Lasix continues. Nitroglycerin paste to left anterior chest. Safety maintained. Continue to monitor.       Problem: Cardiac: Heart Failure (Adult)  Goal: Signs and Symptoms of Listed Potential Problems Will be Absent, Minimized or Managed (Cardiac: Heart Failure)  Outcome: Ongoing (interventions implemented as appropriate)   09/07/19 0646   Goal/Outcome Evaluation   Problems Assessed (Heart Failure) all   Problems Present (Heart Failure) cardiac pump dysfunction;respiratory compromise          2

## 2019-09-07 NOTE — OUTREACH NOTE
Prep Survey      Responses   Facility patient discharged from?  [facility]   Is patient eligible?  Yes   Discharge diagnosis  Aflutter, s/p CARLOS/Cardioversion   Does the patient have one of the following disease processes/diagnoses(primary or secondary)?  Other   Does the patient have Home health ordered?  No   Is there a DME ordered?  No   Comments regarding appointments  See AVS   Prep survey completed?  Yes          Sulema Mills RN

## 2019-09-07 NOTE — H&P
[facility] Medicine Services  HISTORY AND PHYSICAL    Date of Admission: 9/6/2019  Primary Care Physician: Vinayak Correia PA    Subjective     Chief Complaint: Shortness of breath.    History of Present Illness  Patient is 59-year-old  male presented ER with shortness of breath x1 day.  Patient also had episode of epigastric fullness/nausea with shortness of breath.  Patient also had multiple episode of nausea vomiting with it and became a little diaphoretic.  Patient also has symptoms of orthopnea. epigastric discomfort resolved with nitro.  But shortness of breath continue.  Patient did not get any better and presented to the ER to be evaluated tonight.      Patient status post cardiac conversion yesterday by Dr. bullard.  Patient is currently in sinus rhythm.  Patient has a history of CAD/CABG in July 2019 by Dr. Tang.  Patient also history of of diastolic heart failure, diabetes, hyperlipidemia.    Review of Systems   Constitutional: Positive for activity change, appetite change and fatigue. Negative for chills and fever.   HENT: Negative for hearing loss, nosebleeds, tinnitus and trouble swallowing.    Eyes: Negative for visual disturbance.   Respiratory: Positive for shortness of breath and wheezing. Negative for cough and chest tightness.    Cardiovascular: Negative for chest pain, palpitations and leg swelling.   Gastrointestinal: Negative for abdominal distention, abdominal pain, blood in stool, constipation, diarrhea, nausea and vomiting.   Endocrine: Negative for cold intolerance, heat intolerance, polydipsia, polyphagia and polyuria.   Genitourinary: Negative for decreased urine volume, difficulty urinating, dysuria, flank pain, frequency and hematuria.   Musculoskeletal: Positive for arthralgias, gait problem and myalgias. Negative for joint swelling.   Skin: Negative for rash.   Allergic/Immunologic: Negative for immunocompromised state.   Neurological:  Positive for weakness. Negative for dizziness, syncope, light-headedness and headaches.   Hematological: Negative for adenopathy. Does not bruise/bleed easily.   Psychiatric/Behavioral: Negative for confusion and sleep disturbance. The patient is not nervous/anxious.         Otherwise complete ROS reviewed and negative except as mentioned in the HPI.      Past Medical History:   Past Medical History:   Diagnosis Date   • Arthritis    • Asthma    • Carotid disease, bilateral (CMS/HCC)    • Chest pain    • Chronic ischemic heart disease    • Chronic sinusitis    • Maribell bullosa    • Coronary artery disease    • Deviated septum    • Diabetes mellitus (CMS/HCC)    • Difficulty urinating    • Diverticulitis    • Enlarged prostate    • Fatty liver    • GERD (gastroesophageal reflux disease)    • Hyperlipidemia    • Hypertension    • Hypertrophy of nasal turbinates    • Keratoderma    • Kidney stone    • Migraine    • Murmur, heart    • Myocardial infarction (CMS/HCC)    • Obesity    • PONV (postoperative nausea and vomiting)    • Psoriasis    • Seizures (CMS/HCC)    • Sinus congestion    • Sleep apnea    • SOB (shortness of breath)    • Stroke (CMS/HCC)    • UTI (urinary tract infection)        Past Surgical History:  Past Surgical History:   Procedure Laterality Date   • CARDIAC CATHETERIZATION  01/2016    Dr. Broadbent; widely patent previously placed stents in the left anterior descending and obstructive disease involving the diagonal branch which was treated medically   • CARDIAC CATHETERIZATION N/A 7/14/2017    Procedure: Left Heart Cath;  Surgeon: Wade Ramey MD;  Location:  [facility] CATH INVASIVE LOCATION;  Service:    • CARDIAC CATHETERIZATION Left 10/15/2018    Procedure: Cardiac Catheterization/Vascular Study;  Surgeon: Wade Ramey MD;  Location:  [facility] CATH INVASIVE LOCATION;  Service: Cardiology   • CARDIAC CATHETERIZATION  10/15/2018    Procedure: Functional Flow Flowery Branch;  Surgeon: Wade Ramey MD;   Location: [facility] CATH INVASIVE LOCATION;  Service: Cardiology   • CARDIAC CATHETERIZATION N/A 10/15/2018    Procedure: Left ventriculography;  Surgeon: Wade Ramey MD;  Location: [facility] CATH INVASIVE LOCATION;  Service: Cardiology   • CARDIAC CATHETERIZATION Left 6/26/2019    Procedure: Cardiac Catheterization/Vascular Study VEL OK  HE WILL WAIT 1 YEAR FOR SHOULDER SURGERY ;  Surgeon: Wade Ramey MD;  Location: [facility] CATH INVASIVE LOCATION;  Service: Cardiology   • CHOLECYSTECTOMY     • CHOLECYSTECTOMY WITH INTRAOPERATIVE CHOLANGIOGRAM N/A 8/1/2018    Procedure: CHOLECYSTECTOMY LAPAROSCOPIC INTRAOPERATIVE CHOLANGIOGRAM;  Surgeon: Shane Ann MD;  Location: [facility] OR;  Service: General   • CORONARY ANGIOPLASTY     • CORONARY ARTERY BYPASS GRAFT N/A 7/6/2019    Procedure: CABG X2 WITH LIMA, LEFT LEG OVH, AND PLACEMENT OF LEFT FEMORAL ARTERIAL LINE;  Surgeon: Steven Tang MD;  Location: [facility] OR;  Service: Cardiothoracic   • CORONARY STENT PLACEMENT      x 6   • ENDOSCOPIC FUNCTIONAL SINUS SURGERY (FESS) Bilateral 12/13/2017    Procedure: PROCEDURE PERFORMED:  Bilateral functional endoscopic anterior ethmoidectomy with bilateral middle meatal antrostomy Septoplasty Right kathia bullosa resection Bilateral inferior turbinate reduction via Coblation;  Surgeon: Mayank Ibarra MD;  Location: [facility] OR;  Service:    • ENDOSCOPY N/A 7/30/2018    Procedure: ESOPHAGOGASTRODUODENOSCOPY WITH ANESTHESIA;  Surgeon: Benitez Mas MD;  Location: [facility] ENDOSCOPY;  Service: Gastroenterology   • HERNIA REPAIR      x2 inguinal area   • KIDNEY STONE SURGERY     • KNEE SURGERY Right    • OTHER SURGICAL HISTORY      urolift   • PROSTATE SURGERY      Dr. Badillo - 2017   • THUMB AMPUTATION Left     partial   • TOE AMPUTATION Right     big       Family History: family history includes Heart disease in his father; No Known Problems in his mother.    Social History:  reports that he quit smoking about 26 years ago.  His smoking use included cigarettes. He quit after 4.50 years of use. He quit smokeless tobacco use about 10 years ago. His smokeless tobacco use included chew. He reports that he does not drink alcohol or use drugs.    Medications:  Prior to Admission medications    Medication Sig Start Date End Date Taking? Authorizing Provider   albuterol (PROVENTIL HFA;VENTOLIN HFA) 108 (90 BASE) MCG/ACT inhaler Inhale 2 puffs Every 6 (Six) Hours As Needed for wheezing.    Rohan Christina MD   amiodarone (PACERONE) 200 MG tablet Take 1 tablet by mouth 2 (Two) Times a Day. 9/6/19   Azucena Acosta APRN   apixaban (ELIQUIS) 5 MG tablet tablet Take 1 tablet by mouth Every 12 (Twelve) Hours. 8/30/19   Ananya Avila APRN   aspirin 81 MG chewable tablet Chew 81 mg Daily.    Rohan Christina MD   calcium polycarbophil (FIBERCON) 625 MG tablet Take 625 mg by mouth Daily.    Rohan Christina MD   carboxymethylcellulose (REFRESH PLUS) 0.5 % solution Administer 1 drop to both eyes 4 (Four) Times a Day As Needed for Dry Eyes.    Rohan Christina MD   Empagliflozin (JARDIANCE) 10 MG tablet Take 1 tablet by mouth Daily.    Rohan Christina MD   fluticasone (FLONASE) 50 MCG/ACT nasal spray 2 sprays into each nostril Daily.    Rohan Christina MD   gabapentin (NEURONTIN) 100 MG capsule Take 1 capsule by mouth Every Night. 9/21/18 9/21/19  Usha Hammer APRN   insulin aspart (novoLOG FLEXPEN) 100 UNIT/ML solution pen-injector sc pen Inject 30 Units under the skin into the appropriate area as directed Every Morning. 34 units  at breakfast, 30 units at lunch, 34 units hs    Rohan Christina MD   Insulin Glargine (LANTUS SOLOSTAR) 100 UNIT/ML injection pen Inject 60-90 Units under the skin into the appropriate area as directed 2 (Two) Times a Day. 65 units QAM AND 70 units QPM    Rohan Christina MD   lamoTRIgine (LaMICtal) 200 MG tablet Take 1 tablet by mouth 2 (Two) Times a Day. 7/22/19   Jaquelin  TEENA Barrera   levETIRAcetam (KEPPRA) 500 MG tablet Take 3 tablets every morning, take 4 tablets every night.  Patient taking differently: 500 mg Take As Directed. Take 3 tablets every morning, take 4 tablets every night. 7/22/19   Usha Hammer APRN   Liraglutide (VICTOZA SC) Inject 1.2 mg under the skin into the appropriate area as directed Every Night. 1.2    Rohan Christina MD   metFORMIN (GLUCOPHAGE) 1000 MG tablet Take 1 tablet by mouth 2 (Two) Times a Day With Meals. HOLD x 48 hours post contrast. May resume 3/18/18 3/16/18   Meghan Salazar APRN   metoprolol tartrate (LOPRESSOR) 50 MG tablet Take 25 mg by mouth 2 (Two) Times a Day.    Rohan Christina MD   Multiple Vitamin (MULTI VITAMIN PO) Take 1 tablet by mouth Daily.    Rohan Christina MD   mupirocin (BACTROBAN) 2 % ointment Apply 1 application topically to the appropriate area as directed 3 (Three) Times a Day.    Rohan Christina MD   nitroglycerin (NITROSTAT) 0.4 MG SL tablet Place 0.4 mg under the tongue Every 5 (Five) Minutes As Needed for chest pain. Take no more than 3 doses in 15 minutes.    Rohan Christina MD   ondansetron ODT (ZOFRAN-ODT) 4 MG disintegrating tablet Take 1 tablet by mouth Every 4 (Four) Hours As Needed for Nausea or Vomiting. 5/30/19   Andriy Bo Jr., MD   pantoprazole (PROTONIX) 40 MG EC tablet Take 40 mg by mouth 2 (Two) Times a Day.    Rohan Christina MD   psyllium (METAMUCIL) 58.6 % packet Take 1 packet by mouth Daily As Needed (constipation).    Rohan Christina MD   rosuvastatin (CRESTOR) 40 MG tablet Take 20 mg by mouth Every Night.    Rohan Christina MD   urea (CARMOL) 40 % ointment Apply  topically to the appropriate area as directed As Needed for Dry Skin.    Rohan Christina MD     Allergies:  Allergies   Allergen Reactions   • Flagyl [Metronidazole] Hives   • Atorvastatin Other (See Comments)     Muscle cramps   • Ciprofloxacin Hives       Objective  "    Vital Signs: BP (!) 131/101   Pulse 77   Temp 97.6 °F (36.4 °C)   Resp 25   Ht 182.9 cm (72\")   Wt 111 kg (245 lb)   SpO2 93%   BMI 33.23 kg/m²   Physical Exam   Constitutional: He is oriented to person, place, and time. He appears well-developed.   HENT:   Head: Normocephalic and atraumatic.   Eyes: Conjunctivae are normal. Pupils are equal, round, and reactive to light.   Neck: Neck supple. No JVD present. No thyromegaly present.   Cardiovascular: Normal rate, regular rhythm, normal heart sounds and intact distal pulses. Exam reveals no gallop and no friction rub.   No murmur heard.  Patient is currently in sinus rhythm.   Pulmonary/Chest: He is in respiratory distress. He has no wheezes. He has no rales. He exhibits no tenderness.   Rubbing bilateral.  Orthopnea.  Patient is on 2 L of oxygen.   Abdominal: Soft. Bowel sounds are normal. He exhibits no distension. There is no tenderness. There is no rebound and no guarding.   Musculoskeletal: Normal range of motion. He exhibits no edema, tenderness or deformity.   Lymphadenopathy:     He has no cervical adenopathy.   Neurological: He is alert and oriented to person, place, and time. He displays normal reflexes. No cranial nerve deficit.   Skin: Skin is warm and dry. Capillary refill takes 2 to 3 seconds. No rash noted.   Psychiatric: He has a normal mood and affect. His behavior is normal. Judgment and thought content normal.   Nursing note and vitals reviewed.          Results Reviewed:    Lab Results (last 24 hours)     Procedure Component Value Units Date/Time    Troponin [[id number]]  (Normal) Collected:  09/07/19 0248    Specimen:  Blood Updated:  09/07/19 0322     Troponin I 0.020 ng/mL     [place] Draw [[id number]] Collected:  09/07/19 0010    Specimen:  Blood Updated:  09/07/19 0141    Narrative:       The following orders were created for panel order [place] Draw.  Procedure                               Abnormality         Status                  "    ---------                               -----------         ------                     Light Blue Top[[id number]]                                                              Green Top (Gel)[[id number]]                                  Final result               Lavender Top[[id number]]                                     Final result               Red Top[[id number]]                                                                       Please view results for these tests on the individual orders.    Green Top (Gel) [[id number]] Collected:  09/07/19 0010    Specimen:  Blood Updated:  09/07/19 0121     Extra Tube Hold for add-ons.     Comment: Auto resulted.       Lavender Top [[id number]] Collected:  09/07/19 0010    Specimen:  Blood Updated:  09/07/19 0121     Extra Tube hold for add-on     Comment: Auto resulted       Blood Gas, Arterial [[id number]]  (Abnormal) Collected:  09/07/19 0100    Specimen:  Arterial Blood Updated:  09/07/19 0108     Site Right Brachial     Thien's Test N/A     pH, Arterial 7.390 pH units      pCO2, Arterial 36.0 mm Hg      pO2, Arterial 68.0 mm Hg      Comment: 84 Value below reference range        HCO3, Arterial 21.8 mmol/L      Base Excess, Arterial -2.7 mmol/L      Comment: 84 Value below reference range        O2 Saturation, Arterial 90.3 %      Comment: 84 Value below reference range        Temperature 37.0 C      Barometric Pressure for Blood Gas 751 mmHg      Modality Nasal Cannula     Flow Rate 2.0 lpm      Ventilator Mode NA     Collected by [id number]     Comment: Meter: [id number]     :  [id number]       aPTT [[id number]]  (Normal) Collected:  09/06/19 2239    Specimen:  Blood Updated:  09/07/19 0042     PTT 27.1 seconds     Protime-INR [[id number]]  (Abnormal) Collected:  09/06/19 2239    Specimen:  Blood Updated:  09/07/19 0042     Protime 15.7 Seconds      INR 1.21    D-dimer, Quantitative [[id number]]  (Abnormal) Collected:  09/06/19 2239    Specimen:  Blood Updated:   09/07/19 0042     D-Dimer, Quantitative 2.01 mg/L (FEU)     Narrative:       Reference Range is 0-0.50 mg/L FEU. However, results <0.50 mg/L FEU tends to rule out DVT or PE. Results >0.50 mg/L FEU are not useful in predicting absence or presence of DVT or PE.    BNP [[id number]]  (Normal) Collected:  09/07/19 0010    Specimen:  Blood Updated:  09/07/19 0040     proBNP 732.0 pg/mL     Troponin [[id number]]  (Normal) Collected:  09/07/19 0010    Specimen:  Blood Updated:  09/07/19 0038     Troponin I 0.019 ng/mL     Comprehensive Metabolic Panel [[id number]]  (Abnormal) Collected:  09/07/19 0010    Specimen:  Blood Updated:  09/07/19 0036     Glucose 95 mg/dL      BUN 24 mg/dL      Creatinine 1.24 mg/dL      Sodium 140 mmol/L      Potassium 5.1 mmol/L      Chloride 109 mmol/L      CO2 22.0 mmol/L      Calcium 9.7 mg/dL      Total Protein 7.4 g/dL      Albumin 4.30 g/dL      ALT (SGPT) 87 U/L      AST (SGOT) 87 U/L      Alkaline Phosphatase 87 U/L      Total Bilirubin 0.9 mg/dL      eGFR Non African Amer 59 mL/min/1.73      Globulin 3.1 gm/dL      A/G Ratio 1.4 g/dL      BUN/Creatinine Ratio 19.4     Anion Gap 9.0 mmol/L     Narrative:       GFR Normal >60  Chronic Kidney Disease <60  Kidney Failure <15    [place] Draw [[id number]] Collected:  09/06/19 2239    Specimen:  Blood Updated:  09/06/19 2345    Narrative:       The following orders were created for panel order [place] Draw.  Procedure                               Abnormality         Status                     ---------                               -----------         ------                     Light Blue Top[[id number]]                                   Final result               Green Top (Gel)[[id number]]                                  Final result               Lavender Top[[id number]]                                     Final result               Red Top[[id number]]                                          Final result                 Please view results for  these tests on the individual orders.    Light Blue Top [[id number]] Collected:  09/06/19 2239    Specimen:  Blood Updated:  09/06/19 2345     Extra Tube hold for add-on     Comment: Auto resulted       Green Top (Gel) [[id number]] Collected:  09/06/19 2239    Specimen:  Blood Updated:  09/06/19 2345     Extra Tube Hold for add-ons.     Comment: Auto resulted.       Lavender Top [[id number]] Collected:  09/06/19 2239    Specimen:  Blood Updated:  09/06/19 2345     Extra Tube hold for add-on     Comment: Auto resulted       Red Top [[id number]] Collected:  09/06/19 2239    Specimen:  Blood Updated:  09/06/19 2345     Extra Tube Hold for add-ons.     Comment: Auto resulted.       Troponin [[id number]]  (Normal) Collected:  09/06/19 2239    Specimen:  Blood Updated:  09/06/19 2337     Troponin I 0.020 ng/mL     CBC & Differential [[id number]] Collected:  09/06/19 2239    Specimen:  Blood Updated:  09/06/19 2320    Narrative:       The following orders were created for panel order CBC & Differential.  Procedure                               Abnormality         Status                     ---------                               -----------         ------                     CBC Auto Differential[[id number]]        Abnormal            Final result                 Please view results for these tests on the individual orders.    CBC Auto Differential [[id number]]  (Abnormal) Collected:  09/06/19 2239    Specimen:  Blood Updated:  09/06/19 2320     WBC 10.82 10*3/mm3      RBC 4.92 10*6/mm3      Hemoglobin 13.2 g/dL      Hematocrit 42.4 %      MCV 86.2 fL      MCH 26.8 pg      MCHC 31.1 g/dL      RDW 13.8 %      RDW-SD 43.4 fl      MPV 11.9 fL      Platelets 209 10*3/mm3      Neutrophil % 69.6 %      Lymphocyte % 21.7 %      Monocyte % 6.3 %      Eosinophil % 1.7 %      Basophil % 0.4 %      Immature Grans % 0.3 %      Neutrophils, Absolute 7.54 10*3/mm3      Lymphocytes, Absolute 2.35 10*3/mm3      Monocytes, Absolute 0.68 10*3/mm3       Eosinophils, Absolute 0.18 10*3/mm3      Basophils, Absolute 0.04 10*3/mm3      Immature Grans, Absolute 0.03 10*3/mm3      nRBC 0.0 /100 WBC            Radiology Data:    Imaging Results (last 24 hours)     Procedure Component Value Units Date/Time    CT Angiogram Chest With Contrast [[id number]] Updated:  09/07/19 0306    XR Chest 1 View [[id number]] Updated:  09/06/19 4012          I have personally reviewed and interpreted the radiology studies and ECG obtained at time of admission.     Assessment / Plan      Assessment & Plan  Active Hospital Problems    Diagnosis   • Acute congestive heart failure (CMS/HCC)   • Acute pulmonary edema (CMS/HCC)     Plans  Pulmonary edema.  Patient got 60 of Lasix in ER.  Continue Lasix 40 twice a day.  CTA of the chest-no pulmonary embolus, no aortic aneurysm or dissection, no pneumothorax, bilateral moderate pleural effusion, pulmonary edema, enlarged heart, no pericardial effusion, no fracture.    History of chronic diastolic heart failure.    History of atrial fibrillation/diastolic heart failure/hypertension/hyperlipidemia.Cardioversion by Dr. bullard yesterday.  Patient is currently in sinus rhythm.  Continue amiodarone.  Continue Eliquis.  Continue aspirin.  Continue Lopressor.  Patient had CARLOS done yesterday- pending official reading from cardiology.    Diabetes.  Sliding scale for now.  Hold metformin due to IV contrast.    Neuropathy.  Continue Neurontin.    Seizure..  Continue Trileptal.  Continue Keppra.    Code Status: full code     I discussed the patient's findings and my recommendations with: patient    Estimated length of stay:     Vish Latham MD   09/07/19   4:17 AM

## 2019-09-08 VITALS
OXYGEN SATURATION: 96 % | DIASTOLIC BLOOD PRESSURE: 72 MMHG | SYSTOLIC BLOOD PRESSURE: 117 MMHG | RESPIRATION RATE: 18 BRPM | BODY MASS INDEX: 31.75 KG/M2 | HEIGHT: 72 IN | TEMPERATURE: 98 F | HEART RATE: 66 BPM | WEIGHT: 234.4 LBS

## 2019-09-08 PROBLEM — R09.02 HYPOXIA: Status: ACTIVE | Noted: 2019-09-08

## 2019-09-08 PROBLEM — R06.03 RESPIRATORY DISTRESS: Status: ACTIVE | Noted: 2019-09-08

## 2019-09-08 PROBLEM — Z87.898 HISTORY OF SEIZURE: Status: ACTIVE | Noted: 2019-07-02

## 2019-09-08 PROBLEM — J90 PLEURAL EFFUSION: Status: ACTIVE | Noted: 2019-09-08

## 2019-09-08 PROBLEM — Z86.39 HISTORY OF HYPERLIPIDEMIA: Status: ACTIVE | Noted: 2019-09-08

## 2019-09-08 PROBLEM — I50.31 ACUTE DIASTOLIC CONGESTIVE HEART FAILURE: Status: ACTIVE | Noted: 2019-09-07

## 2019-09-08 PROBLEM — E87.70 FLUID OVERLOAD: Status: ACTIVE | Noted: 2019-09-08

## 2019-09-08 LAB
ALBUMIN SERPL-MCNC: 3.7 G/DL (ref 3.5–5)
ALBUMIN/GLOB SERPL: 1.3 G/DL (ref 1.1–2.5)
ALP SERPL-CCNC: 79 U/L (ref 24–120)
ALT SERPL W P-5'-P-CCNC: 89 U/L (ref 0–54)
ANION GAP SERPL CALCULATED.3IONS-SCNC: 8 MMOL/L (ref 4–13)
AST SERPL-CCNC: 64 U/L (ref 7–45)
BILIRUB SERPL-MCNC: 0.9 MG/DL (ref 0.1–1)
BUN BLD-MCNC: 21 MG/DL (ref 5–21)
BUN/CREAT SERPL: 18.3 (ref 7–25)
CALCIUM SPEC-SCNC: 9.4 MG/DL (ref 8.4–10.4)
CHLORIDE SERPL-SCNC: 106 MMOL/L (ref 98–110)
CO2 SERPL-SCNC: 25 MMOL/L (ref 24–31)
CREAT BLD-MCNC: 1.15 MG/DL (ref 0.5–1.4)
GFR SERPL CREATININE-BSD FRML MDRD: 65 ML/MIN/1.73
GLOBULIN UR ELPH-MCNC: 2.9 GM/DL
GLUCOSE BLD-MCNC: 98 MG/DL (ref 70–100)
GLUCOSE BLDC GLUCOMTR-MCNC: 102 MG/DL (ref 70–130)
GLUCOSE BLDC GLUCOMTR-MCNC: 142 MG/DL (ref 70–130)
POTASSIUM BLD-SCNC: 4.8 MMOL/L (ref 3.5–5.3)
PROT SERPL-MCNC: 6.6 G/DL (ref 6.3–8.7)
SODIUM BLD-SCNC: 139 MMOL/L (ref 135–145)

## 2019-09-08 PROCEDURE — 99232 SBSQ HOSP IP/OBS MODERATE 35: CPT | Performed by: NURSE PRACTITIONER

## 2019-09-08 PROCEDURE — 80053 COMPREHEN METABOLIC PANEL: CPT | Performed by: NURSE PRACTITIONER

## 2019-09-08 PROCEDURE — 94760 N-INVAS EAR/PLS OXIMETRY 1: CPT

## 2019-09-08 PROCEDURE — 94799 UNLISTED PULMONARY SVC/PX: CPT

## 2019-09-08 PROCEDURE — 82962 GLUCOSE BLOOD TEST: CPT

## 2019-09-08 RX ORDER — FUROSEMIDE 40 MG/1
40 TABLET ORAL DAILY PRN
Qty: 15 TABLET | Refills: 0 | Status: SHIPPED | OUTPATIENT
Start: 2019-09-08 | End: 2019-09-09

## 2019-09-08 RX ADMIN — SODIUM CHLORIDE, PRESERVATIVE FREE 10 ML: 5 INJECTION INTRAVENOUS at 09:08

## 2019-09-08 RX ADMIN — FUROSEMIDE 40 MG: 40 TABLET ORAL at 08:59

## 2019-09-08 RX ADMIN — LEVETIRACETAM 1500 MG: 500 TABLET, FILM COATED ORAL at 08:58

## 2019-09-08 RX ADMIN — LAMOTRIGINE 200 MG: 100 TABLET ORAL at 08:58

## 2019-09-08 RX ADMIN — FAMOTIDINE 20 MG: 10 INJECTION INTRAVENOUS at 09:08

## 2019-09-08 RX ADMIN — APIXABAN 5 MG: 5 TABLET, FILM COATED ORAL at 08:58

## 2019-09-08 RX ADMIN — ASPIRIN 81 MG 81 MG: 81 TABLET ORAL at 08:59

## 2019-09-08 RX ADMIN — CALCIUM POLYCARBOPHIL 625 MG: 625 TABLET, FILM COATED ORAL at 09:07

## 2019-09-08 RX ADMIN — METOPROLOL TARTRATE 25 MG: 25 TABLET, FILM COATED ORAL at 08:59

## 2019-09-08 NOTE — PLAN OF CARE
Problem: Patient Care Overview  Goal: Plan of Care Review  Outcome: Ongoing (interventions implemented as appropriate)   09/07/19 0646 09/07/19 2050 09/08/19 0442   Coping/Psychosocial   Plan of Care Reviewed With --  patient --    Plan of Care Review   Progress no change --  --    OTHER   Outcome Summary --  --  pt no c/o pain. edema improving. vss. pt resting comfortably       Problem: Cardiac: Heart Failure (Adult)  Goal: Signs and Symptoms of Listed Potential Problems Will be Absent, Minimized or Managed (Cardiac: Heart Failure)  Outcome: Ongoing (interventions implemented as appropriate)

## 2019-09-08 NOTE — PROGRESS NOTES
[facility] -  Progress Note     LOS: 1 day   Patient Care Team:  Vinayak Correia PA as PCP - General (Physician Assistant)  Vinayak Correia PA as PCP - Family Medicine  Wade Ramey MD as PCP - Claims Attributed  Wade Ramey MD as Cardiologist (Cardiology)  Sumanth Badillo MD as Consulting Physician (Urology)  Cyril Blake MD as Referring Physician (Otolaryngology)  Mayank Ibarra MD as Consulting Physician (Otolaryngology)  Hamilton White MD as Consulting Physician (Neurology)    Chief Complaint:pulmonary edema    Subjective     Interval History:   Patient it up walking the salas when I came to assess him. He states he feels fine and is ready to go home. He denies chest pain, shortness of breath and palpitations.     Review of Systems:   Review of Systems   Constitutional: Negative for chills, diaphoresis, fatigue and unexpected weight change.   HENT: Negative for nosebleeds.    Respiratory: Negative for cough, chest tightness, shortness of breath and wheezing.    Cardiovascular: Negative for chest pain, palpitations and leg swelling.   Gastrointestinal: Negative for anal bleeding, blood in stool, diarrhea and nausea.   Neurological: Negative for dizziness, syncope and light-headedness.       Objective     Vital Sign Min/Max for last 24 hours  Temp  Min: 98 °F (36.7 °C)  Max: 98.2 °F (36.8 °C)   BP  Min: 117/72  Max: 127/74   Pulse  Min: 66  Max: 70   Resp  Min: 16  Max: 18   SpO2  Min: 94 %  Max: 97 %   No Data Recorded   No Data Recorded   Tele:NSR 66-78 with first degree      09/07/19  0549   Weight: 106 kg (234 lb 6.4 oz)         Intake/Output Summary (Last 24 hours) at 9/8/2019 1532  Last data filed at 9/8/2019 1404  Gross per 24 hour   Intake 360 ml   Output 1250 ml   Net -890 ml         Physical Exam:  Physical Exam   Constitutional: He is oriented to person, place, and time. He appears well-developed and well-nourished. No distress.   HENT:   Head:  Normocephalic.   Mouth/Throat: No oropharyngeal exudate.   Eyes: Conjunctivae and EOM are normal. Pupils are equal, round, and reactive to light. No scleral icterus.   Neck: Normal range of motion. Neck supple.   Cardiovascular: Normal rate, regular rhythm, normal heart sounds and intact distal pulses.   No murmur heard.  Pulmonary/Chest: Effort normal and breath sounds normal. No respiratory distress. He has no wheezes. He has no rales. He exhibits no tenderness.   Abdominal: Soft. Bowel sounds are normal. He exhibits no distension. There is no tenderness.   Musculoskeletal: Normal range of motion. He exhibits no edema.   Neurological: He is alert and oriented to person, place, and time. He has normal reflexes.   Skin: Skin is warm and dry. No rash noted. He is not diaphoretic. No erythema. No pallor.   Psychiatric: He has a normal mood and affect. His behavior is normal.   Vitals reviewed.       Results Review:   Lab Results (last 72 hours)     Procedure Component Value Units Date/Time    POC Glucose Once [[id number]]  (Abnormal) Collected:  09/08/19 1108    Specimen:  Blood Updated:  09/08/19 1119     Glucose 142 mg/dL      Comment: : [id number] DriftyMeter ID: [id number]       POC Glucose Once [[id number]]  (Normal) Collected:  09/08/19 0726    Specimen:  Blood Updated:  09/08/19 0737     Glucose 102 mg/dL      Comment: : [id number] DriftyMeter ID: [id number]       Comprehensive Metabolic Panel [[id number]]  (Abnormal) Collected:  09/08/19 0527    Specimen:  Blood Updated:  09/08/19 0649     Glucose 98 mg/dL      BUN 21 mg/dL      Creatinine 1.15 mg/dL      Sodium 139 mmol/L      Potassium 4.8 mmol/L      Chloride 106 mmol/L      CO2 25.0 mmol/L      Calcium 9.4 mg/dL      Total Protein 6.6 g/dL      Albumin 3.70 g/dL      ALT (SGPT) 89 U/L      AST (SGOT) 64 U/L      Alkaline Phosphatase 79 U/L      Total Bilirubin 0.9 mg/dL      eGFR Non African Amer 65 mL/min/1.73      Globulin 2.9 gm/dL       A/G Ratio 1.3 g/dL      BUN/Creatinine Ratio 18.3     Anion Gap 8.0 mmol/L     Narrative:       GFR Normal >60  Chronic Kidney Disease <60  Kidney Failure <15    POC Glucose Once [[id number]]  (Abnormal) Collected:  09/07/19 2056    Specimen:  Blood Updated:  09/07/19 2107     Glucose 248 mg/dL      Comment: : [id number] Jose Luis AvelarMeter ID: [id number]       POC Glucose Once [[id number]]  (Abnormal) Collected:  09/07/19 1630    Specimen:  Blood Updated:  09/07/19 1641     Glucose 185 mg/dL      Comment: : [id number] Reynaldo hTomasleeMeter ID: [id number]       POC Glucose Once [[id number]]  (Abnormal) Collected:  09/07/19 1127    Specimen:  Blood Updated:  09/07/19 1138     Glucose 187 mg/dL      Comment: : [id number] Reynaldo ThomasleeMeter ID: [id number]       Hemoglobin A1c [[id number]]  (Abnormal) Collected:  09/07/19 0642    Specimen:  Blood Updated:  09/07/19 0858     Hemoglobin A1C 6.5 %     Narrative:       Less than 6.0           Non-Diabetic Range  6.0-7.0                 ADA Therapeutic Target  Greater than 7.0        Action Suggested    TSH [[id number]]  (Normal) Collected:  09/07/19 0642    Specimen:  Blood Updated:  09/07/19 0757     TSH 2.020 uIU/mL     Lipid Panel [[id number]]  (Abnormal) Collected:  09/07/19 0642    Specimen:  Blood Updated:  09/07/19 0738     Total Cholesterol 120 mg/dL      Triglycerides 74 mg/dL      HDL Cholesterol 36 mg/dL      LDL Cholesterol  74 mg/dL      LDL/HDL Ratio 1.92    Comprehensive Metabolic Panel [[id number]]  (Abnormal) Collected:  09/07/19 0642    Specimen:  Blood Updated:  09/07/19 0727     Glucose 89 mg/dL      BUN 24 mg/dL      Creatinine 1.19 mg/dL      Sodium 142 mmol/L      Potassium 4.4 mmol/L      Chloride 106 mmol/L      CO2 24.0 mmol/L      Calcium 10.1 mg/dL      Total Protein 8.0 g/dL      Albumin 4.70 g/dL      ALT (SGPT) 109 U/L      AST (SGOT) 100 U/L      Alkaline Phosphatase 104 U/L      Total Bilirubin 1.3 mg/dL      eGFR Non  Amer 62  mL/min/1.73      Globulin 3.3 gm/dL      A/G Ratio 1.4 g/dL      BUN/Creatinine Ratio 20.2     Anion Gap 12.0 mmol/L     Narrative:       GFR Normal >60  Chronic Kidney Disease <60  Kidney Failure <15    CBC Auto Differential [[id number]]  (Abnormal) Collected:  09/07/19 0642    Specimen:  Blood Updated:  09/07/19 0723     WBC 10.30 10*3/mm3      RBC 4.90 10*6/mm3      Hemoglobin 13.1 g/dL      Hematocrit 41.4 %      MCV 84.5 fL      MCH 26.7 pg      MCHC 31.6 g/dL      RDW 13.8 %      RDW-SD 42.8 fl      MPV 12.0 fL      Platelets 192 10*3/mm3      Neutrophil % 76.4 %      Lymphocyte % 15.4 %      Monocyte % 7.1 %      Eosinophil % 0.4 %      Basophil % 0.4 %      Immature Grans % 0.3 %      Neutrophils, Absolute 7.87 10*3/mm3      Lymphocytes, Absolute 1.59 10*3/mm3      Monocytes, Absolute 0.73 10*3/mm3      Eosinophils, Absolute 0.04 10*3/mm3      Basophils, Absolute 0.04 10*3/mm3      Immature Grans, Absolute 0.03 10*3/mm3      nRBC 0.0 /100 WBC     Troponin [[id number]]  (Normal) Collected:  09/07/19 0248    Specimen:  Blood Updated:  09/07/19 0322     Troponin I 0.020 ng/mL     [facility] Draw [[id number]] Collected:  09/07/19 0010    Specimen:  Blood Updated:  09/07/19 0141    Narrative:       The following orders were created for panel order [facility] Draw.  Procedure                               Abnormality         Status                     ---------                               -----------         ------                     Light Blue Top[[id number]]                                                              Green Top (Gel)[[id number]]                                  Final result               Lavender Top[[id number]]                                     Final result               Red Top[[id number]]                                                                       Please view results for these tests on the individual orders.    Green Top (Gel) [[id number]] Collected:  09/07/19 0010    Specimen:  Blood  Updated:  09/07/19 0121     Extra Tube Hold for add-ons.     Comment: Auto resulted.       Lavender Top [[id number]] Collected:  09/07/19 0010    Specimen:  Blood Updated:  09/07/19 0121     Extra Tube hold for add-on     Comment: Auto resulted       Blood Gas, Arterial [[id number]]  (Abnormal) Collected:  09/07/19 0100    Specimen:  Arterial Blood Updated:  09/07/19 0108     Site Right Brachial     Thien's Test N/A     pH, Arterial 7.390 pH units      pCO2, Arterial 36.0 mm Hg      pO2, Arterial 68.0 mm Hg      Comment: 84 Value below reference range        HCO3, Arterial 21.8 mmol/L      Base Excess, Arterial -2.7 mmol/L      Comment: 84 Value below reference range        O2 Saturation, Arterial 90.3 %      Comment: 84 Value below reference range        Temperature 37.0 C      Barometric Pressure for Blood Gas 751 mmHg      Modality Nasal Cannula     Flow Rate 2.0 lpm      Ventilator Mode NA     Collected by [id number]     Comment: Meter: [id number]     :  [id number]       aPTT [[id number]]  (Normal) Collected:  09/06/19 2239    Specimen:  Blood Updated:  09/07/19 0042     PTT 27.1 seconds     Protime-INR [[id number]]  (Abnormal) Collected:  09/06/19 2239    Specimen:  Blood Updated:  09/07/19 0042     Protime 15.7 Seconds      INR 1.21    D-dimer, Quantitative [[id number]]  (Abnormal) Collected:  09/06/19 2239    Specimen:  Blood Updated:  09/07/19 0042     D-Dimer, Quantitative 2.01 mg/L (FEU)     Narrative:       Reference Range is 0-0.50 mg/L FEU. However, results <0.50 mg/L FEU tends to rule out DVT or PE. Results >0.50 mg/L FEU are not useful in predicting absence or presence of DVT or PE.    BNP [[id number]]  (Normal) Collected:  09/07/19 0010    Specimen:  Blood Updated:  09/07/19 0040     proBNP 732.0 pg/mL     Troponin [[id number]]  (Normal) Collected:  09/07/19 0010    Specimen:  Blood Updated:  09/07/19 0038     Troponin I 0.019 ng/mL     Comprehensive Metabolic Panel [[id number]]  (Abnormal)  Collected:  09/07/19 0010    Specimen:  Blood Updated:  09/07/19 0036     Glucose 95 mg/dL      BUN 24 mg/dL      Creatinine 1.24 mg/dL      Sodium 140 mmol/L      Potassium 5.1 mmol/L      Chloride 109 mmol/L      CO2 22.0 mmol/L      Calcium 9.7 mg/dL      Total Protein 7.4 g/dL      Albumin 4.30 g/dL      ALT (SGPT) 87 U/L      AST (SGOT) 87 U/L      Alkaline Phosphatase 87 U/L      Total Bilirubin 0.9 mg/dL      eGFR Non African Amer 59 mL/min/1.73      Globulin 3.1 gm/dL      A/G Ratio 1.4 g/dL      BUN/Creatinine Ratio 19.4     Anion Gap 9.0 mmol/L     Narrative:       GFR Normal >60  Chronic Kidney Disease <60  Kidney Failure <15    [facility] Draw [[id number]] Collected:  09/06/19 2239    Specimen:  Blood Updated:  09/06/19 2345    Narrative:       The following orders were created for panel order [facility] Draw.  Procedure                               Abnormality         Status                     ---------                               -----------         ------                     Light Blue Top[[id number]]                                   Final result               Green Top (Gel)[[id number]]                                  Final result               Lavender Top[[id number]]                                     Final result               Red Top[[id number]]                                          Final result                 Please view results for these tests on the individual orders.    Light Blue Top [[id number]] Collected:  09/06/19 2239    Specimen:  Blood Updated:  09/06/19 2345     Extra Tube hold for add-on     Comment: Auto resulted       Green Top (Gel) [[id number]] Collected:  09/06/19 2239    Specimen:  Blood Updated:  09/06/19 2345     Extra Tube Hold for add-ons.     Comment: Auto resulted.       Lavender Top [[id number]] Collected:  09/06/19 2239    Specimen:  Blood Updated:  09/06/19 2345     Extra Tube hold for add-on     Comment: Auto resulted       Red Top [[id number]] Collected:  09/06/19 2239     Specimen:  Blood Updated:  09/06/19 2345     Extra Tube Hold for add-ons.     Comment: Auto resulted.       Troponin [[id number]]  (Normal) Collected:  09/06/19 2239    Specimen:  Blood Updated:  09/06/19 2337     Troponin I 0.020 ng/mL     CBC & Differential [[id number]] Collected:  09/06/19 2239    Specimen:  Blood Updated:  09/06/19 2320    Narrative:       The following orders were created for panel order CBC & Differential.  Procedure                               Abnormality         Status                     ---------                               -----------         ------                     CBC Auto Differential[[id number]]        Abnormal            Final result                 Please view results for these tests on the individual orders.    CBC Auto Differential [[id number]]  (Abnormal) Collected:  09/06/19 2239    Specimen:  Blood Updated:  09/06/19 2320     WBC 10.82 10*3/mm3      RBC 4.92 10*6/mm3      Hemoglobin 13.2 g/dL      Hematocrit 42.4 %      MCV 86.2 fL      MCH 26.8 pg      MCHC 31.1 g/dL      RDW 13.8 %      RDW-SD 43.4 fl      MPV 11.9 fL      Platelets 209 10*3/mm3      Neutrophil % 69.6 %      Lymphocyte % 21.7 %      Monocyte % 6.3 %      Eosinophil % 1.7 %      Basophil % 0.4 %      Immature Grans % 0.3 %      Neutrophils, Absolute 7.54 10*3/mm3      Lymphocytes, Absolute 2.35 10*3/mm3      Monocytes, Absolute 0.68 10*3/mm3      Eosinophils, Absolute 0.18 10*3/mm3      Basophils, Absolute 0.04 10*3/mm3      Immature Grans, Absolute 0.03 10*3/mm3      nRBC 0.0 /100 WBC               Echo EF Estimated  Lab Results   Component Value Date    ECHOEFEST 60 07/07/2019         Cath Ejection Fraction Quantitative  No results found for: CATHEF        Medication Review: yes  Current Facility-Administered Medications   Medication Dose Route Frequency Provider Last Rate Last Dose   • albuterol (PROVENTIL) nebulizer solution 0.083% 2.5 mg/3mL  2.5 mg Nebulization Q6H PRN Vish Latham MD       •  apixaban (ELIQUIS) tablet 5 mg  5 mg Oral Q12H Vish Latham MD   5 mg at 09/08/19 0858   • aspirin chewable tablet 81 mg  81 mg Oral Daily Vish Latham MD   81 mg at 09/08/19 0859   • calcium polycarbophil (FIBERCON) tablet 625 mg  625 mg Oral Daily Vish Latham MD   625 mg at 09/08/19 0907   • famotidine (PEPCID) injection 20 mg  20 mg Intravenous BID Vish Latham MD   20 mg at 09/08/19 0908   • furosemide (LASIX) tablet 40 mg  40 mg Oral Daily Azucena Acosta APRN   40 mg at 09/08/19 0859   • gabapentin (NEURONTIN) capsule 100 mg  100 mg Oral Nightly Vish Latham MD   100 mg at 09/07/19 2102   • insulin aspart (novoLOG FLEXPEN) sc pen 30 Units  30 Units Subcutaneous Daily With Lunch Gregor Bell MD   30 Units at 09/08/19 1301   • insulin aspart (novoLOG FLEXPEN) sc pen 34 Units  34 Units Subcutaneous Daily With Breakfast Gregor Bell MD   34 Units at 09/08/19 0900   • insulin aspart (novoLOG FLEXPEN) sc pen 34 Units  34 Units Subcutaneous Nightly Gregor Bell MD   34 Units at 09/07/19 2057   • Insulin Glargine (LANTUS SOLOSTAR) injection pen solution pen-injector 60 Units  60 Units Subcutaneous Daily Gregor Bell MD   60 Units at 09/08/19 0900   • Insulin Glargine (LANTUS SOLOSTAR) injection pen solution pen-injector 70 Units  70 Units Subcutaneous Nightly Gregor Bell MD   70 Units at 09/07/19 2057   • lamoTRIgine (LaMICtal) tablet 200 mg  200 mg Oral BID Vish Latham MD   200 mg at 09/08/19 0858   • levETIRAcetam (KEPPRA) tablet 1,500 mg  1,500 mg Oral Daily Gregor Bell MD   1,500 mg at 09/08/19 0858   • levETIRAcetam (KEPPRA) tablet 2,000 mg  2,000 mg Oral Nightly Gregor Bell MD   2,000 mg at 09/07/19 2053   • Liraglutide (VICTOZA) injection 1.2 mg  1.2 mg Subcutaneous Nightly Gregor Bell MD   1.2 mg at 09/07/19 2054   • metoprolol tartrate (LOPRESSOR) tablet 25 mg  25 mg Oral BID  Vish Latham MD   25 mg at 09/08/19 0859   • nitroglycerin (NITROSTAT) SL tablet 0.4 mg  0.4 mg Sublingual Q5 Min PRN Vish Latham MD       • ondansetron (ZOFRAN) injection 4 mg  4 mg Intravenous Q6H PRN Vish Latham MD       • sodium chloride 0.9 % flush 10 mL  10 mL Intravenous Q12H Vish Latham MD   10 mL at 09/08/19 0908   • sodium chloride 0.9 % flush 10 mL  10 mL Intravenous PRN Vish Latham MD         Assessment/Plan       Type 2 diabetes mellitus without complication, with long-term current use of insulin (CMS/[facility])    Essential hypertension    Coronary artery disease involving native coronary artery of native heart with angina pectoris (CMS/[facility])    Diabetic peripheral neuropathy (CMS/[facility])    History of seizure    Atrial flutter (CMS/[facility]) with cardioversion on Sept 5, 2019    Acute diastolic congestive heart failure (CMS/[facility])    Fluid overload    Pleural effusion    History of hyperlipidemia    Respiratory distress    Plan:  -ok for d/c from cardiology standpoint  -continue to hold amio and statin.   -f/u with Dr. Ramey 9/13. Will need to have CMP to assess LFTs prior to visit. I will order.   - will send home with PRN lasix 40mg to take daily for swelling, weight gain or shortness of breath. This can be adjusted if needed at follow up. I educated the wife and patient on how to take PRN lasix for 2-3 lb weight gain in 3 days or 5lbs in 1 week.   -continue Eliquis, ASA, and lopressor  -patient is ok to resume cardiac rehab    Further recommendations per Dr. Sue who is covering for Dr. Ramey.     Azucena Acosta, APRN  09/08/19  3:32 PM

## 2019-09-08 NOTE — DISCHARGE SUMMARY
[facility] Medicine Services  DISCHARGE SUMMARY       Date of Admission: 9/6/2019  Date of Discharge:  9/8/2019  Primary Care Physician: Vinayak Correia PA    Discharge Diagnoses:  Active Hospital Problems    Diagnosis   • Fluid overload   • Pleural effusion   • History of hyperlipidemia   • Respiratory distress   • Acute diastolic congestive heart failure (CMS/HCC)   • Atrial flutter (CMS/HCC) with cardioversion on Sept 5, 2019   • History of seizure   • Diabetic peripheral neuropathy (CMS/HCC)   • Coronary artery disease involving native coronary artery of native heart with angina pectoris (CMS/HCC)   • Essential hypertension   • Type 2 diabetes mellitus without complication, with long-term current use of insulin (CMS/HCC)            Presenting Problem/History of Present Illness:  Acute congestive heart failure, unspecified heart failure type (CMS/HCC) [I50.9]         Hospital Course   Patient was recently discharged from cardiology in September 6.  He was admitted on September 5 due to arrhythmia (atrial flutter with rapid ventricular response of 122 150).  Patient reportedly received 20 mg bolus of Cardizem.  Ischemic work-up for chest pain showed negative enzymes and no EKG changes to suggest ischemia.  Patient underwent CARLOS/cardioversion on September 6 by Dr. Llamas.  After 1 biphasic shock of 50 J he converted to normal sinus rhythm.  He was discharge  and returned on same day with shortness of breath accompanied by epigastric fullness/nausea.    He received diuretic treatment.  He is maintaining Spo2 > 90 on RA.  He is ambulating w/o previous complaints of SOB.  He is not requiring oxygen.  He is doing a lot better.     Cardiology saw him in consult.  Per Dr. Sue normal BNP effectively rules out acute cardiac pulmonary edema.  He felt mild LFT elevation suggestive of possible drug side effect and agree with holding statin and amiodarone.  Transaminases  improving.    Overall, he is medically stable and appropriate for discharge from my standpoint if ok with Cardiology service.    Procedures Performed:   None    Consults:   Dr. Sue    Pertinent Test Results:   Lab Results (last 48 hours)     Procedure Component Value Units Date/Time    POC Glucose Once [[id number]]  (Abnormal) Collected:  09/08/19 1108    Specimen:  Blood Updated:  09/08/19 1119     Glucose 142 mg/dL      Comment: : [id number] [employer]eter ID: [id number]       POC Glucose Once [[id number]]  (Normal) Collected:  09/08/19 0726    Specimen:  Blood Updated:  09/08/19 0737     Glucose 102 mg/dL      Comment: : [id number] [employer] ID: [id number]       Comprehensive Metabolic Panel [[id number]]  (Abnormal) Collected:  09/08/19 0527    Specimen:  Blood Updated:  09/08/19 0649     Glucose 98 mg/dL      BUN 21 mg/dL      Creatinine 1.15 mg/dL      Sodium 139 mmol/L      Potassium 4.8 mmol/L      Chloride 106 mmol/L      CO2 25.0 mmol/L      Calcium 9.4 mg/dL      Total Protein 6.6 g/dL      Albumin 3.70 g/dL      ALT (SGPT) 89 U/L      AST (SGOT) 64 U/L      Alkaline Phosphatase 79 U/L      Total Bilirubin 0.9 mg/dL      eGFR Non African Amer 65 mL/min/1.73      Globulin 2.9 gm/dL      A/G Ratio 1.3 g/dL      BUN/Creatinine Ratio 18.3     Anion Gap 8.0 mmol/L     Narrative:       GFR Normal >60  Chronic Kidney Disease <60  Kidney Failure <15    POC Glucose Once [[id number]]  (Abnormal) Collected:  09/07/19 2056    Specimen:  Blood Updated:  09/07/19 2107     Glucose 248 mg/dL      Comment: : [id number] [employer] ValentinoMeter ID: [id number]       POC Glucose Once [[id number]]  (Abnormal) Collected:  09/07/19 1630    Specimen:  Blood Updated:  09/07/19 1641     Glucose 185 mg/dL      Comment: : [id number] [employer] cK ID: [id number]       POC Glucose Once [[id number]]  (Abnormal) Collected:  09/07/19 1127    Specimen:  Blood Updated:  09/07/19 1138     Glucose 187 mg/dL       Comment: : [id number] [employer] Kc ID: [id number]       Hemoglobin A1c [[id number]]  (Abnormal) Collected:  09/07/19 0642    Specimen:  Blood Updated:  09/07/19 0858     Hemoglobin A1C 6.5 %     Narrative:       Less than 6.0           Non-Diabetic Range  6.0-7.0                 ADA Therapeutic Target  Greater than 7.0        Action Suggested    TSH [[id number]]  (Normal) Collected:  09/07/19 0642    Specimen:  Blood Updated:  09/07/19 0757     TSH 2.020 uIU/mL     Lipid Panel [[id number]]  (Abnormal) Collected:  09/07/19 0642    Specimen:  Blood Updated:  09/07/19 0738     Total Cholesterol 120 mg/dL      Triglycerides 74 mg/dL      HDL Cholesterol 36 mg/dL      LDL Cholesterol  74 mg/dL      LDL/HDL Ratio 1.92    Comprehensive Metabolic Panel [[id number]]  (Abnormal) Collected:  09/07/19 0642    Specimen:  Blood Updated:  09/07/19 0727     Glucose 89 mg/dL      BUN 24 mg/dL      Creatinine 1.19 mg/dL      Sodium 142 mmol/L      Potassium 4.4 mmol/L      Chloride 106 mmol/L      CO2 24.0 mmol/L      Calcium 10.1 mg/dL      Total Protein 8.0 g/dL      Albumin 4.70 g/dL      ALT (SGPT) 109 U/L      AST (SGOT) 100 U/L      Alkaline Phosphatase 104 U/L      Total Bilirubin 1.3 mg/dL      eGFR Non African Amer 62 mL/min/1.73      Globulin 3.3 gm/dL      A/G Ratio 1.4 g/dL      BUN/Creatinine Ratio 20.2     Anion Gap 12.0 mmol/L     Narrative:       GFR Normal >60  Chronic Kidney Disease <60  Kidney Failure <15    CBC Auto Differential [[id number]]  (Abnormal) Collected:  09/07/19 0642    Specimen:  Blood Updated:  09/07/19 0723     WBC 10.30 10*3/mm3      RBC 4.90 10*6/mm3      Hemoglobin 13.1 g/dL      Hematocrit 41.4 %      MCV 84.5 fL      MCH 26.7 pg      MCHC 31.6 g/dL      RDW 13.8 %      RDW-SD 42.8 fl      MPV 12.0 fL      Platelets 192 10*3/mm3      Neutrophil % 76.4 %      Lymphocyte % 15.4 %      Monocyte % 7.1 %      Eosinophil % 0.4 %      Basophil % 0.4 %      Immature Grans % 0.3 %      Neutrophils,  Absolute 7.87 10*3/mm3      Lymphocytes, Absolute 1.59 10*3/mm3      Monocytes, Absolute 0.73 10*3/mm3      Eosinophils, Absolute 0.04 10*3/mm3      Basophils, Absolute 0.04 10*3/mm3      Immature Grans, Absolute 0.03 10*3/mm3      nRBC 0.0 /100 WBC     Troponin [[id number]]  (Normal) Collected:  09/07/19 0248    Specimen:  Blood Updated:  09/07/19 0322     Troponin I 0.020 ng/mL     [place] Draw [[id number]] Collected:  09/07/19 0010    Specimen:  Blood Updated:  09/07/19 0141    Narrative:       The following orders were created for panel order [place] Draw.  Procedure                               Abnormality         Status                     ---------                               -----------         ------                     Light Blue Top[[id number]]                                                              Green Top (Gel)[[id number]]                                  Final result               Lavender Top[[id number]]                                     Final result               Red Top[[id number]]                                                                       Please view results for these tests on the individual orders.    Green Top (Gel) [[id number]] Collected:  09/07/19 0010    Specimen:  Blood Updated:  09/07/19 0121     Extra Tube Hold for add-ons.     Comment: Auto resulted.       Lavender Top [[id number]] Collected:  09/07/19 0010    Specimen:  Blood Updated:  09/07/19 0121     Extra Tube hold for add-on     Comment: Auto resulted       Blood Gas, Arterial [[id number]]  (Abnormal) Collected:  09/07/19 0100    Specimen:  Arterial Blood Updated:  09/07/19 0108     Site Right Brachial     Thien's Test N/A     pH, Arterial 7.390 pH units      pCO2, Arterial 36.0 mm Hg      pO2, Arterial 68.0 mm Hg      Comment: 84 Value below reference range        HCO3, Arterial 21.8 mmol/L      Base Excess, Arterial -2.7 mmol/L      Comment: 84 Value below reference range        O2 Saturation, Arterial 90.3 %       Comment: 84 Value below reference range        Temperature 37.0 C      Barometric Pressure for Blood Gas 751 mmHg      Modality Nasal Cannula     Flow Rate 2.0 lpm      Ventilator Mode NA     Collected by [id number]     Comment: Meter: [id number]     :  [id number]       aPTT [[id number]]  (Normal) Collected:  09/06/19 2239    Specimen:  Blood Updated:  09/07/19 0042     PTT 27.1 seconds     Protime-INR [[id number]]  (Abnormal) Collected:  09/06/19 2239    Specimen:  Blood Updated:  09/07/19 0042     Protime 15.7 Seconds      INR 1.21    D-dimer, Quantitative [[id number]]  (Abnormal) Collected:  09/06/19 2239    Specimen:  Blood Updated:  09/07/19 0042     D-Dimer, Quantitative 2.01 mg/L (FEU)     Narrative:       Reference Range is 0-0.50 mg/L FEU. However, results <0.50 mg/L FEU tends to rule out DVT or PE. Results >0.50 mg/L FEU are not useful in predicting absence or presence of DVT or PE.    BNP [[id number]]  (Normal) Collected:  09/07/19 0010    Specimen:  Blood Updated:  09/07/19 0040     proBNP 732.0 pg/mL     Troponin [[id number]]  (Normal) Collected:  09/07/19 0010    Specimen:  Blood Updated:  09/07/19 0038     Troponin I 0.019 ng/mL     Comprehensive Metabolic Panel [[id number]]  (Abnormal) Collected:  09/07/19 0010    Specimen:  Blood Updated:  09/07/19 0036     Glucose 95 mg/dL      BUN 24 mg/dL      Creatinine 1.24 mg/dL      Sodium 140 mmol/L      Potassium 5.1 mmol/L      Chloride 109 mmol/L      CO2 22.0 mmol/L      Calcium 9.7 mg/dL      Total Protein 7.4 g/dL      Albumin 4.30 g/dL      ALT (SGPT) 87 U/L      AST (SGOT) 87 U/L      Alkaline Phosphatase 87 U/L      Total Bilirubin 0.9 mg/dL      eGFR Non African Amer 59 mL/min/1.73      Globulin 3.1 gm/dL      A/G Ratio 1.4 g/dL      BUN/Creatinine Ratio 19.4     Anion Gap 9.0 mmol/L     Narrative:       GFR Normal >60  Chronic Kidney Disease <60  Kidney Failure <15    [place] Draw [[id number]] Collected:  09/06/19 2604    Specimen:  Blood  Updated:  09/06/19 2345    Narrative:       The following orders were created for panel order [place] Draw.  Procedure                               Abnormality         Status                     ---------                               -----------         ------                     Light Blue Top[[id number]]                                   Final result               Green Top (Gel)[[id number]]                                  Final result               Lavender Top[[id number]]                                     Final result               Red Top[[id number]]                                          Final result                 Please view results for these tests on the individual orders.    Light Blue Top [[id number]] Collected:  09/06/19 2239    Specimen:  Blood Updated:  09/06/19 2345     Extra Tube hold for add-on     Comment: Auto resulted       Green Top (Gel) [[id number]] Collected:  09/06/19 2239    Specimen:  Blood Updated:  09/06/19 2345     Extra Tube Hold for add-ons.     Comment: Auto resulted.       Lavender Top [[id number]] Collected:  09/06/19 2239    Specimen:  Blood Updated:  09/06/19 2345     Extra Tube hold for add-on     Comment: Auto resulted       Red Top [[id number]] Collected:  09/06/19 2239    Specimen:  Blood Updated:  09/06/19 2345     Extra Tube Hold for add-ons.     Comment: Auto resulted.       Troponin [[id number]]  (Normal) Collected:  09/06/19 2239    Specimen:  Blood Updated:  09/06/19 2337     Troponin I 0.020 ng/mL     CBC & Differential [[id number]] Collected:  09/06/19 2239    Specimen:  Blood Updated:  09/06/19 2320    Narrative:       The following orders were created for panel order CBC & Differential.  Procedure                               Abnormality         Status                     ---------                               -----------         ------                     CBC Auto Differential[[id number]]        Abnormal            Final result                 Please view results  for these tests on the individual orders.    CBC Auto Differential [[id number]]  (Abnormal) Collected:  09/06/19 2239    Specimen:  Blood Updated:  09/06/19 2320     WBC 10.82 10*3/mm3      RBC 4.92 10*6/mm3      Hemoglobin 13.2 g/dL      Hematocrit 42.4 %      MCV 86.2 fL      MCH 26.8 pg      MCHC 31.1 g/dL      RDW 13.8 %      RDW-SD 43.4 fl      MPV 11.9 fL      Platelets 209 10*3/mm3      Neutrophil % 69.6 %      Lymphocyte % 21.7 %      Monocyte % 6.3 %      Eosinophil % 1.7 %      Basophil % 0.4 %      Immature Grans % 0.3 %      Neutrophils, Absolute 7.54 10*3/mm3      Lymphocytes, Absolute 2.35 10*3/mm3      Monocytes, Absolute 0.68 10*3/mm3      Eosinophils, Absolute 0.18 10*3/mm3      Basophils, Absolute 0.04 10*3/mm3      Immature Grans, Absolute 0.03 10*3/mm3      nRBC 0.0 /100 WBC         Imaging Results (last 72 hours)     Procedure Component Value Units Date/Time    CT Angiogram Chest With Contrast [[id number]] Collected:  09/07/19 0900     Updated:  09/07/19 0913    Narrative:       EXAM: CT ANGIOGRAM CHEST W CONTRAST- - 9/7/2019 2:27 AM CDT     HISTORY: elevated dimer, sob       COMPARISON: 08/30/2019.      DOSE LENGTH PRODUCT: 477 mGy cm. Automatic exposure control was utilized  to make radiation dose as low as reasonably achievable.     TECHNIQUE: Enhanced axial CT images obtained with multiplanar reformats.  3D postprocessing, including MIPs, performed and images saved to PACS.     FINDINGS:   AIRWAYS/PULMONARY PARENCHYMA: Main airways patent. Diffuse thickening of  the small airways with patchy groundglass opacities, greatest at the  lung bases. Some of the groundglass opacities appear nodular. For  instance, a 7 mm groundglass pulmonary nodule in the right upper lobe on  axial series 5, image 49. No dense focal consolidation. Small to  moderate bilateral pleural effusions.     VESSELS: Contrast bolus is adequate. No pulmonary artery filling defect  to suggest pulmonary embolus. Aorta normal in  "course and caliber.       CARDIAC:  Borderline heart size. No pericardial effusion.  Coronary  artery densities, suspect calcifications with stent.      MEDIASTINUM: Small mediastinal lymph nodes. No mediastinal adenopathy.  Esophagus has normal coarse, caliber and wall thickness.     EXTRATHORACIC: The visualized portions of the thyroid gland are  unremarkable. No thoracic inlet or axillary adenopathy. The  extrathoracic soft tissues are within normal limits.      INCLUDED UPPER ABDOMEN: The visualized portions of the upper abdomen are  within normal limits.     OSSEOUS: Degenerative changes in the visualized spine. Hemangioma in the  lower thoracic vertebral body. Sternotomy wires.           Impression:       1. Patchy bilateral groundglass opacities with diffuse thickening of the  small airways, most likely edema or infection. Moderate bilateral  pleural effusions. Some of the opacities are nodular; consider follow-up  chest CT in 3-6 months per Fleischner criteria.  2. Borderline heart size. Coronary artery densities likely calcified  atherosclerosis with stent.     Preliminary interpretation performed by [facility] and I agree with the  preliminary report.   Exam was tagged in PACS with \"new lung findings\" to assist in  appropriate follow up.   This report was finalized on 09/07/2019 09:10 by Dr Mari Melton MD.    XR Chest 1 View [[id number]] Collected:  09/07/19 0855     Updated:  09/07/19 0903    Narrative:       EXAM: XR CHEST 1 VW- - 9/6/2019 11:17 PM CDT     HISTORY: Chest Pain Triage Protocol       COMPARISON: 09/05/2019.      TECHNIQUE:  1 images.  Frontal view of the chest.     FINDINGS:    No pneumothorax, pleural effusion or focal consolidation. Prominent  cardiac silhouette. Upper mediastinum within normal limits. The  previously demonstrated sternotomy wires are not evident on this exam,  but the appearance of the chest is otherwise similar compared to priors  and nonvisualization of the sternotomy " "wires is likely to be due to  technique.          Impression:       1. No acute cardiopulmonary finding. Similar prominent cardiac  silhouette.  This report was finalized on 09/07/2019 09:00 by Dr Mari Melton MD.            Condition on Discharge:  Stable    Physical Exam on Discharge:  /72 (BP Location: Left arm, Patient Position: Sitting)   Pulse 66   Temp 98 °F (36.7 °C) (Oral)   Resp 18   Ht 182.9 cm (72\")   Wt 106 kg (234 lb 6.4 oz)   SpO2 96%   BMI 31.79 kg/m²   Physical Exam   Constitutional: He is oriented to person, place, and time. He appears well-developed.   HENT:   Head: Normocephalic and atraumatic.   Right Ear: External ear normal.   Left Ear: External ear normal.   Mouth/Throat: Oropharynx is clear and moist.   Eyes: Conjunctivae are normal. Pupils are equal, round, and reactive to light.   Neck: Normal range of motion. Neck supple. No tracheal deviation present. No thyromegaly present.   Cardiovascular: Normal rate, regular rhythm, normal heart sounds and intact distal pulses.   Pulmonary/Chest: Effort normal and breath sounds normal.   Abdominal: Soft. Bowel sounds are normal. He exhibits no distension. There is no tenderness.   Musculoskeletal: Normal range of motion. He exhibits no tenderness.   Neurological: He is alert and oriented to person, place, and time. No cranial nerve deficit. He exhibits normal muscle tone. Coordination normal.   Skin: Skin is dry. Capillary refill takes less than 2 seconds. He is not diaphoretic. No erythema. No pallor.   Dry scaly skin with thickening on his bilateral elbows   Psychiatric: He has a normal mood and affect. His behavior is normal. Judgment and thought content normal.    Healing well left leg from grafting from prior coronary artery bypass graft      Discharge Disposition:      Discharge Medications:     Discharge Medications      New Medications      Instructions Start Date   furosemide 40 MG tablet  Commonly known as:  LASIX   40 mg, " Oral, Daily PRN         Changes to Medications      Instructions Start Date   levETIRAcetam 500 MG tablet  Commonly known as:  KEPPRA  What changed:    · how much to take  · when to take this  · additional instructions   Take 3 tablets every morning, take 4 tablets every night.         Continue These Medications      Instructions Start Date   albuterol sulfate  (90 Base) MCG/ACT inhaler  Commonly known as:  PROVENTIL HFA;VENTOLIN HFA;PROAIR HFA   2 puffs, Inhalation, Every 6 Hours PRN      apixaban 5 MG tablet tablet  Commonly known as:  ELIQUIS   5 mg, Oral, Every 12 Hours Scheduled      aspirin 81 MG chewable tablet   81 mg, Oral, Daily      calcium polycarbophil 625 MG tablet  Commonly known as:  FIBERCON   625 mg, Oral, Daily      carboxymethylcellulose 0.5 % solution  Commonly known as:  REFRESH PLUS   1 drop, Both Eyes, 4 Times Daily PRN      fluticasone 50 MCG/ACT nasal spray  Commonly known as:  FLONASE   2 sprays, Nasal, Daily      gabapentin 100 MG capsule  Commonly known as:  NEURONTIN   100 mg, Oral, Nightly      insulin aspart 100 UNIT/ML solution pen-injector sc pen  Commonly known as:  novoLOG FLEXPEN   30 Units, Subcutaneous, Every Morning, 34 units  at breakfast, 30 units at lunch, 34 units hs      Insulin Glargine 100 UNIT/ML injection pen  Commonly known as:  LANTUS SOLOSTAR   60-90 Units, Subcutaneous, 2 Times Daily, 65 units QAM AND 70 units QPM      JARDIANCE 10 MG tablet  Generic drug:  Empagliflozin   1 tablet, Oral, Daily      lamoTRIgine 200 MG tablet  Commonly known as:  LaMICtal   200 mg, Oral, 2 Times Daily      metFORMIN 1000 MG tablet  Commonly known as:  GLUCOPHAGE   1,000 mg, Oral, 2 Times Daily With Meals, HOLD x 48 hours post contrast. May resume 3/18/18      metoprolol tartrate 50 MG tablet  Commonly known as:  LOPRESSOR   25 mg, Oral, 2 Times Daily      MULTI VITAMIN PO   1 tablet, Oral, Daily      mupirocin 2 % ointment  Commonly known as:  BACTROBAN   1 application,  Topical, 3 Times Daily      nitroglycerin 0.4 MG SL tablet  Commonly known as:  NITROSTAT   0.4 mg, Sublingual, Every 5 Minutes PRN, Take no more than 3 doses in 15 minutes.      ondansetron ODT 4 MG disintegrating tablet  Commonly known as:  ZOFRAN-ODT   4 mg, Oral, Every 4 Hours PRN      pantoprazole 40 MG EC tablet  Commonly known as:  PROTONIX   40 mg, Oral, 2 Times Daily      psyllium 58.6 % packet  Commonly known as:  METAMUCIL   1 packet, Oral, Daily PRN      urea 40 % ointment  Commonly known as:  CARMOL   Topical, As Needed      VICTOZA SC   1.2 mg, Subcutaneous, Nightly, 1.2         Stop These Medications    amiodarone 200 MG tablet  Commonly known as:  PACERONE     rosuvastatin 40 MG tablet  Commonly known as:  CRESTOR            Discharge Diet:   Diet Instructions     Diet: Regular, Consistent Carbohydrate, Cardiac; Thin      Discharge Diet:   Regular  Consistent Carbohydrate  Cardiac       Fluid Consistency:  Thin          Discharge Care Plan / Instructions: daily weight   Activity at Discharge:   Activity Instructions     Activity as Tolerated            Follow-up Appointments: keep f/u with Dr. Ramey this week, PCP within this week  Test Results Pending at Discharge:      Gregor Bell MD  09/08/19  2:10 PM    Time: > 30 mins      Part of this note may be an electronic transcription/translation of spoken language to printed text using the Dragon Dictation System.

## 2019-09-08 NOTE — DISCHARGE INSTR - LAB
-ok for d/c from cardiology standpoint  -continue to hold amio and statin.   -f/u with Dr. Ramey 9/13. Will need to have CMP to assess LFTs prior to visit. I will order.   - will send home with PRN lasix 40mg to take daily for swelling, weight gain or shortness of breath. This can be adjusted if needed at follow up. I educated the wife and patient on how to take PRN lasix for 2-3 lb weight gain in 3 days or 5lbs in 1 week.   -continue Eliquis, ASA, and lopressor  -patient is ok to resume cardiac rehab

## 2019-09-09 ENCOUNTER — READMISSION MANAGEMENT (OUTPATIENT)
Dept: CALL CENTER | Facility: HOSPITAL | Age: 59
End: 2019-09-09

## 2019-09-09 ENCOUNTER — TREATMENT (OUTPATIENT)
Dept: CARDIAC REHAB | Facility: HOSPITAL | Age: 59
End: 2019-09-09

## 2019-09-09 DIAGNOSIS — Z95.1 S/P CABG (CORONARY ARTERY BYPASS GRAFT): Primary | ICD-10-CM

## 2019-09-09 LAB
BH CV ECHO MEAS - MR MAX PG: 106.5 MMHG
BH CV ECHO MEAS - MR MAX VEL: 516 CM/SEC

## 2019-09-09 PROCEDURE — 93798 PHYS/QHP OP CAR RHAB W/ECG: CPT

## 2019-09-09 NOTE — PAYOR COMM NOTE
"[facility]  SAN[place],  [phone]  OR  FAX  [phone]    Carlos A Boyer Jr. (59 y.o. Male)     Date of Birth Social Security Number Address Home Phone MRN    [id number]  [id number]  [facility] [address] [phone] [id number]    Zoroastrian Marital Status          Protestant        Admission Date Admission Type Admitting Provider Attending Provider Department, Room/Bed    9/6/19 Emergency Gregor Bell MD  [facility] 4C, 495/1    Discharge Date Discharge Disposition Discharge Destination        9/8/2019 Home or Self Care              Attending Provider:  (none)   Allergies:  Flagyl [Metronidazole], Atorvastatin, Ciprofloxacin    Isolation:  None   Infection:  None   Code Status:  Prior    Ht:  182.9 cm (72\")   Wt:  106 kg (234 lb 6.4 oz)    Admission Cmt:  None   Principal Problem:  None                Active Insurance as of 9/6/2019     Primary Coverage     Payor Plan Insurance Group Employer/Plan Group    HUMANA MEDICARE REPLACEMENT HUMANA MEDICARE REPL [id number]     Payor Plan Address Payor Plan Phone Number Payor Plan Fax Number Effective Dates    [address] [address] [phone]  1/1/2019 - None Entered    [facility] [id number]       Subscriber Name Subscriber Birth Date Member ID       CARLOS A BOYER JR. 1960 [id number]                 Emergency Contacts      (Rel.) Home Phone Work Phone Mobile Phone    Daniela Boyer (Spouse) -- [phone]               History & Physical      Vish Latham MD at 9/7/2019  4:16 AM              [facility] Medicine Services  HISTORY AND PHYSICAL    Date of Admission: 9/6/2019  Primary Care Physician: Vinayak Correia PA    Subjective     Chief Complaint: Shortness of breath.    History of Present Illness  Patient is 59-year-old  male presented ER with shortness of breath x1 day.  Patient also had episode of epigastric fullness/nausea with " shortness of breath.  Patient also had multiple episode of nausea vomiting with it and became a little diaphoretic.  Patient also has symptoms of orthopnea. epigastric discomfort resolved with nitro.  But shortness of breath continue.  Patient did not get any better and presented to the ER to be evaluated tonight.      Patient status post cardiac conversion yesterday by Dr. bullard.  Patient is currently in sinus rhythm.  Patient has a history of CAD/CABG in July 2019 by Dr. Tang.  Patient also history of of diastolic heart failure, diabetes, hyperlipidemia.    Review of Systems   Constitutional: Positive for activity change, appetite change and fatigue. Negative for chills and fever.   HENT: Negative for hearing loss, nosebleeds, tinnitus and trouble swallowing.    Eyes: Negative for visual disturbance.   Respiratory: Positive for shortness of breath and wheezing. Negative for cough and chest tightness.    Cardiovascular: Negative for chest pain, palpitations and leg swelling.   Gastrointestinal: Negative for abdominal distention, abdominal pain, blood in stool, constipation, diarrhea, nausea and vomiting.   Endocrine: Negative for cold intolerance, heat intolerance, polydipsia, polyphagia and polyuria.   Genitourinary: Negative for decreased urine volume, difficulty urinating, dysuria, flank pain, frequency and hematuria.   Musculoskeletal: Positive for arthralgias, gait problem and myalgias. Negative for joint swelling.   Skin: Negative for rash.   Allergic/Immunologic: Negative for immunocompromised state.   Neurological: Positive for weakness. Negative for dizziness, syncope, light-headedness and headaches.   Hematological: Negative for adenopathy. Does not bruise/bleed easily.   Psychiatric/Behavioral: Negative for confusion and sleep disturbance. The patient is not nervous/anxious.         Otherwise complete ROS reviewed and negative except as mentioned in the HPI.      Past Medical History:   Past Medical  History:   Diagnosis Date   • Arthritis    • Asthma    • Carotid disease, bilateral (CMS/HCC)    • Chest pain    • Chronic ischemic heart disease    • Chronic sinusitis    • Maribell bullosa    • Coronary artery disease    • Deviated septum    • Diabetes mellitus (CMS/HCC)    • Difficulty urinating    • Diverticulitis    • Enlarged prostate    • Fatty liver    • GERD (gastroesophageal reflux disease)    • Hyperlipidemia    • Hypertension    • Hypertrophy of nasal turbinates    • Keratoderma    • Kidney stone    • Migraine    • Murmur, heart    • Myocardial infarction (CMS/HCC)    • Obesity    • PONV (postoperative nausea and vomiting)    • Psoriasis    • Seizures (CMS/HCC)    • Sinus congestion    • Sleep apnea    • SOB (shortness of breath)    • Stroke (CMS/HCC)    • UTI (urinary tract infection)        Past Surgical History:  Past Surgical History:   Procedure Laterality Date   • CARDIAC CATHETERIZATION  01/2016    Dr. Broadbent; widely patent previously placed stents in the left anterior descending and obstructive disease involving the diagonal branch which was treated medically   • CARDIAC CATHETERIZATION N/A 7/14/2017    Procedure: Left Heart Cath;  Surgeon: Wade Ramey MD;  Location:  [facility] CATH INVASIVE LOCATION;  Service:    • CARDIAC CATHETERIZATION Left 10/15/2018    Procedure: Cardiac Catheterization/Vascular Study;  Surgeon: Wade Ramey MD;  Location:  [facility] CATH INVAS[facility];  Service: Cardiology   • CARDIAC CATHETERIZATION  10/15/2018    Procedure: Functional Flow [place];  Surgeon: Wade Ramey MD;  Location:  [facility] CATH INVASIVE [facility];  Service: Cardiology   • CARDIAC CATHETERIZATION N/A 10/15/2018    Procedure: Left ventriculography;  Surgeon: Wade Ramey MD;  Location:  [facility] CATH INV[facility];  Service: Cardiology   • CARDIAC CATHETERIZATION Left 6/26/2019    Procedure: Cardiac Catheterization/Vascular Study VEL OK  HE WILL WAIT 1 YEAR FOR SHOULDER SURGERY ;  Surgeon: Karoline  MD Wade;  Location: [facility] CATH INVASIVE LOCATION;  Service: Cardiology   • CHOLECYSTECTOMY     • CHOLECYSTECTOMY WITH INTRAOPERATIVE CHOLANGIOGRAM N/A 8/1/2018    Procedure: CHOLECYSTECTOMY LAPAROSCOPIC INTRAOPERATIVE CHOLANGIOGRAM;  Surgeon: Shane Ann MD;  Location: [facility] OR;  Service: General   • CORONARY ANGIOPLASTY     • CORONARY ARTERY BYPASS GRAFT N/A 7/6/2019    Procedure: CABG X2 WITH LIMA, LEFT LEG OVH, AND PLACEMENT OF LEFT FEMORAL ARTERIAL LINE;  Surgeon: Steven Tang MD;  Location: [facility] OR;  Service: Cardiothoracic   • CORONARY STENT PLACEMENT      x 6   • ENDOSCOPIC FUNCTIONAL SINUS SURGERY (FESS) Bilateral 12/13/2017    Procedure: PROCEDURE PERFORMED:  Bilateral functional endoscopic anterior ethmoidectomy with bilateral middle meatal antrostomy Septoplasty Right kathia bullosa resection Bilateral inferior turbinate reduction via Coblation;  Surgeon: Mayank Ibarra MD;  Location: [facility] OR;  Service:    • ENDOSCOPY N/A 7/30/2018    Procedure: ESOPHAGOGASTRODUODENOSCOPY WITH ANESTHESIA;  Surgeon: Benitez Mas MD;  Location: [facility] ENDOSCOPY;  Service: Gastroenterology   • HERNIA REPAIR      x2 inguinal area   • KIDNEY STONE SURGERY     • KNEE SURGERY Right    • OTHER SURGICAL HISTORY      urolift   • PROSTATE SURGERY      Dr. Badillo - 2017   • THUMB AMPUTATION Left     partial   • TOE AMPUTATION Right     big       Family History: family history includes Heart disease in his father; No Known Problems in his mother.    Social History:  reports that he quit smoking about 26 years ago. His smoking use included cigarettes. He quit after 4.50 years of use. He quit smokeless tobacco use about 10 years ago. His smokeless tobacco use included chew. He reports that he does not drink alcohol or use drugs.    Medications:  Prior to Admission medications    Medication Sig Start Date End Date Taking? Authorizing Provider   albuterol (PROVENTIL HFA;VENTOLIN HFA) 108 (90 BASE) MCG/ACT  inhaler Inhale 2 puffs Every 6 (Six) Hours As Needed for wheezing.    Rohan Christina MD   amiodarone (PACERONE) 200 MG tablet Take 1 tablet by mouth 2 (Two) Times a Day. 9/6/19   Azucena Acosta APRN   apixaban (ELIQUIS) 5 MG tablet tablet Take 1 tablet by mouth Every 12 (Twelve) Hours. 8/30/19   Ananya Avila APRN   aspirin 81 MG chewable tablet Chew 81 mg Daily.    Rohan Christina MD   calcium polycarbophil (FIBERCON) 625 MG tablet Take 625 mg by mouth Daily.    Rohan Chrisitna MD   carboxymethylcellulose (REFRESH PLUS) 0.5 % solution Administer 1 drop to both eyes 4 (Four) Times a Day As Needed for Dry Eyes.    Rohan Christina MD   Empagliflozin (JARDIANCE) 10 MG tablet Take 1 tablet by mouth Daily.    Rohan Christina MD   fluticasone (FLONASE) 50 MCG/ACT nasal spray 2 sprays into each nostril Daily.    Rohan Christina MD   gabapentin (NEURONTIN) 100 MG capsule Take 1 capsule by mouth Every Night. 9/21/18 9/21/19  Usha Hammer APRN   insulin aspart (novoLOG FLEXPEN) 100 UNIT/ML solution pen-injector sc pen Inject 30 Units under the skin into the appropriate area as directed Every Morning. 34 units  at breakfast, 30 units at lunch, 34 units hs    Rohan Christina MD   Insulin Glargine (LANTUS SOLOSTAR) 100 UNIT/ML injection pen Inject 60-90 Units under the skin into the appropriate area as directed 2 (Two) Times a Day. 65 units QAM AND 70 units QPM    Rohan Christina MD   lamoTRIgine (LaMICtal) 200 MG tablet Take 1 tablet by mouth 2 (Two) Times a Day. 7/22/19   Usha Hammre APRN   levETIRAcetam (KEPPRA) 500 MG tablet Take 3 tablets every morning, take 4 tablets every night.  Patient taking differently: 500 mg Take As Directed. Take 3 tablets every morning, take 4 tablets every night. 7/22/19   Usha Hammer APRN   Liraglutide (VICTOZA SC) Inject 1.2 mg under the skin into the appropriate area as directed Every Night. 1.2    Rohan Christina MD  "  metFORMIN (GLUCOPHAGE) 1000 MG tablet Take 1 tablet by mouth 2 (Two) Times a Day With Meals. HOLD x 48 hours post contrast. May resume 3/18/18 3/16/18   Meghan Salazar APRN   metoprolol tartrate (LOPRESSOR) 50 MG tablet Take 25 mg by mouth 2 (Two) Times a Day.    Rohan Christina MD   Multiple Vitamin (MULTI VITAMIN PO) Take 1 tablet by mouth Daily.    Rohan Christina MD   mupirocin (BACTROBAN) 2 % ointment Apply 1 application topically to the appropriate area as directed 3 (Three) Times a Day.    Rohan Christina MD   nitroglycerin (NITROSTAT) 0.4 MG SL tablet Place 0.4 mg under the tongue Every 5 (Five) Minutes As Needed for chest pain. Take no more than 3 doses in 15 minutes.    Rohan Crhistina MD   ondansetron ODT (ZOFRAN-ODT) 4 MG disintegrating tablet Take 1 tablet by mouth Every 4 (Four) Hours As Needed for Nausea or Vomiting. 5/30/19   Andriy Bo Jr., MD   pantoprazole (PROTONIX) 40 MG EC tablet Take 40 mg by mouth 2 (Two) Times a Day.    Rohan Christina MD   psyllium (METAMUCIL) 58.6 % packet Take 1 packet by mouth Daily As Needed (constipation).    Rohan Christina MD   rosuvastatin (CRESTOR) 40 MG tablet Take 20 mg by mouth Every Night.    Rohan Christina MD   urea (CARMOL) 40 % ointment Apply  topically to the appropriate area as directed As Needed for Dry Skin.    Rohan Christina MD     Allergies:  Allergies   Allergen Reactions   • Flagyl [Metronidazole] Hives   • Atorvastatin Other (See Comments)     Muscle cramps   • Ciprofloxacin Hives       Objective     Vital Signs: BP (!) 131/101   Pulse 77   Temp 97.6 °F (36.4 °C)   Resp 25   Ht 182.9 cm (72\")   Wt 111 kg (245 lb)   SpO2 93%   BMI 33.23 kg/m²    Physical Exam   Constitutional: He is oriented to person, place, and time. He appears well-developed.   HENT:   Head: Normocephalic and atraumatic.   Eyes: Conjunctivae are normal. Pupils are equal, round, and reactive to light.   Neck: " Neck supple. No JVD present. No thyromegaly present.   Cardiovascular: Normal rate, regular rhythm, normal heart sounds and intact distal pulses. Exam reveals no gallop and no friction rub.   No murmur heard.  Patient is currently in sinus rhythm.   Pulmonary/Chest: He is in respiratory distress. He has no wheezes. He has no rales. He exhibits no tenderness.   Rubbing bilateral.  Orthopnea.  Patient is on 2 L of oxygen.   Abdominal: Soft. Bowel sounds are normal. He exhibits no distension. There is no tenderness. There is no rebound and no guarding.   Musculoskeletal: Normal range of motion. He exhibits no edema, tenderness or deformity.   Lymphadenopathy:     He has no cervical adenopathy.   Neurological: He is alert and oriented to person, place, and time. He displays normal reflexes. No cranial nerve deficit.   Skin: Skin is warm and dry. Capillary refill takes 2 to 3 seconds. No rash noted.   Psychiatric: He has a normal mood and affect. His behavior is normal. Judgment and thought content normal.   Nursing note and vitals reviewed.          Results Reviewed:    Lab Results (last 24 hours)     Procedure Component Value Units Date/Time    Troponin [[id number]]  (Normal) Collected:  09/07/19 0248    Specimen:  Blood Updated:  09/07/19 0322     Troponin I 0.020 ng/mL     [place] Draw [[id number]] Collected:  09/07/19 0010    Specimen:  Blood Updated:  09/07/19 0141    Narrative:       The following orders were created for panel order [place] Draw.  Procedure                               Abnormality         Status                     ---------                               -----------         ------                     Light Blue Top[[id number]]                                                              Green Top (Gel)[[id number]]                                  Final result               Lavender Top[[id number]]                                     Final result               Red Top[[id number]]                                                                        Please view results for these tests on the individual orders.    Green Top (Gel) [[id number]] Collected:  09/07/19 0010    Specimen:  Blood Updated:  09/07/19 0121     Extra Tube Hold for add-ons.     Comment: Auto resulted.       Lavender Top [[id number]] Collected:  09/07/19 0010    Specimen:  Blood Updated:  09/07/19 0121     Extra Tube hold for add-on     Comment: Auto resulted       Blood Gas, Arterial [[id number]]  (Abnormal) Collected:  09/07/19 0100    Specimen:  Arterial Blood Updated:  09/07/19 0108     Site Right Brachial     Thien's Test N/A     pH, Arterial 7.390 pH units      pCO2, Arterial 36.0 mm Hg      pO2, Arterial 68.0 mm Hg      Comment: 84 Value below reference range        HCO3, Arterial 21.8 mmol/L      Base Excess, Arterial -2.7 mmol/L      Comment: 84 Value below reference range        O2 Saturation, Arterial 90.3 %      Comment: 84 Value below reference range        Temperature 37.0 C      Barometric Pressure for Blood Gas 751 mmHg      Modality Nasal Cannula     Flow Rate 2.0 lpm      Ventilator Mode NA     Collected by [id number]     Comment: Meter: [id number]     :  [id number]       aPTT [[id number]]  (Normal) Collected:  09/06/19 2239    Specimen:  Blood Updated:  09/07/19 0042     PTT 27.1 seconds     Protime-INR [[id number]]  (Abnormal) Collected:  09/06/19 2239    Specimen:  Blood Updated:  09/07/19 0042     Protime 15.7 Seconds      INR 1.21    D-dimer, Quantitative [[id number]]  (Abnormal) Collected:  09/06/19 2239    Specimen:  Blood Updated:  09/07/19 0042     D-Dimer, Quantitative 2.01 mg/L (FEU)     Narrative:       Reference Range is 0-0.50 mg/L FEU. However, results <0.50 mg/L FEU tends to rule out DVT or PE. Results >0.50 mg/L FEU are not useful in predicting absence or presence of DVT or PE.    BNP [[id number]]  (Normal) Collected:  09/07/19 0010    Specimen:  Blood Updated:  09/07/19 0040     proBNP 732.0 pg/mL     Troponin  [[id number]]  (Normal) Collected:  09/07/19 0010    Specimen:  Blood Updated:  09/07/19 0038     Troponin I 0.019 ng/mL     Comprehensive Metabolic Panel [[id number]]  (Abnormal) Collected:  09/07/19 0010    Specimen:  Blood Updated:  09/07/19 0036     Glucose 95 mg/dL      BUN 24 mg/dL      Creatinine 1.24 mg/dL      Sodium 140 mmol/L      Potassium 5.1 mmol/L      Chloride 109 mmol/L      CO2 22.0 mmol/L      Calcium 9.7 mg/dL      Total Protein 7.4 g/dL      Albumin 4.30 g/dL      ALT (SGPT) 87 U/L      AST (SGOT) 87 U/L      Alkaline Phosphatase 87 U/L      Total Bilirubin 0.9 mg/dL      eGFR Non African Amer 59 mL/min/1.73      Globulin 3.1 gm/dL      A/G Ratio 1.4 g/dL      BUN/Creatinine Ratio 19.4     Anion Gap 9.0 mmol/L     Narrative:       GFR Normal >60  Chronic Kidney Disease <60  Kidney Failure <15    [place] Draw [[id number]] Collected:  09/06/19 2239    Specimen:  Blood Updated:  09/06/19 2345    Narrative:       The following orders were created for panel order [place] Draw.  Procedure                               Abnormality         Status                     ---------                               -----------         ------                     Light Blue Top[[id number]]                                   Final result               Green Top (Gel)[[id number]]                                  Final result               Lavender Top[[id number]]                                     Final result               Red Top[[id number]]                                          Final result                 Please view results for these tests on the individual orders.    Light Blue Top [[id number]] Collected:  09/06/19 2239    Specimen:  Blood Updated:  09/06/19 2345     Extra Tube hold for add-on     Comment: Auto resulted       Green Top (Gel) [[id number]] Collected:  09/06/19 2239    Specimen:  Blood Updated:  09/06/19 2345     Extra Tube Hold for add-ons.     Comment: Auto resulted.       Lavender Top [[id number]]  Collected:  09/06/19 2239    Specimen:  Blood Updated:  09/06/19 2345     Extra Tube hold for add-on     Comment: Auto resulted       Red Top [[id number]] Collected:  09/06/19 2239    Specimen:  Blood Updated:  09/06/19 2345     Extra Tube Hold for add-ons.     Comment: Auto resulted.       Troponin [[id number]]  (Normal) Collected:  09/06/19 2239    Specimen:  Blood Updated:  09/06/19 2337     Troponin I 0.020 ng/mL     CBC & Differential [[id number]] Collected:  09/06/19 2239    Specimen:  Blood Updated:  09/06/19 2320    Narrative:       The following orders were created for panel order CBC & Differential.  Procedure                               Abnormality         Status                     ---------                               -----------         ------                     CBC Auto Differential[[id number]]        Abnormal            Final result                 Please view results for these tests on the individual orders.    CBC Auto Differential [[id number]]  (Abnormal) Collected:  09/06/19 2239    Specimen:  Blood Updated:  09/06/19 2320     WBC 10.82 10*3/mm3      RBC 4.92 10*6/mm3      Hemoglobin 13.2 g/dL      Hematocrit 42.4 %      MCV 86.2 fL      MCH 26.8 pg      MCHC 31.1 g/dL      RDW 13.8 %      RDW-SD 43.4 fl      MPV 11.9 fL      Platelets 209 10*3/mm3      Neutrophil % 69.6 %      Lymphocyte % 21.7 %      Monocyte % 6.3 %      Eosinophil % 1.7 %      Basophil % 0.4 %      Immature Grans % 0.3 %      Neutrophils, Absolute 7.54 10*3/mm3      Lymphocytes, Absolute 2.35 10*3/mm3      Monocytes, Absolute 0.68 10*3/mm3      Eosinophils, Absolute 0.18 10*3/mm3      Basophils, Absolute 0.04 10*3/mm3      Immature Grans, Absolute 0.03 10*3/mm3      nRBC 0.0 /100 WBC            Radiology Data:    Imaging Results (last 24 hours)     Procedure Component Value Units Date/Time    CT Angiogram Chest With Contrast [[id number]] Updated:  09/07/19 0306    XR Chest 1 View [[id number]] Updated:  09/06/19 2322          I  have personally reviewed and interpreted the radiology studies and ECG obtained at time of admission.     Assessment / Plan      Assessment & Plan  Active Hospital Problems    Diagnosis   • Acute congestive heart failure (CMS/HCC)   • Acute pulmonary edema (CMS/HCC)     Plans  Pulmonary edema.  Patient got 60 of Lasix in ER.  Continue Lasix 40 twice a day.  CTA of the chest-no pulmonary embolus, no aortic aneurysm or dissection, no pneumothorax, bilateral moderate pleural effusion, pulmonary edema, enlarged heart, no pericardial effusion, no fracture.    History of chronic diastolic heart failure.    History of atrial fibrillation/diastolic heart failure/hypertension/hyperlipidemia.Cardioversion by Dr. bullard yesterday.  Patient is currently in sinus rhythm.  Continue amiodarone.  Continue Eliquis.  Continue aspirin.  Continue Lopressor.  Patient had CARLOS done yesterday- pending official reading from cardiology.    Diabetes.  Sliding scale for now.  Hold metformin due to IV contrast.    Neuropathy.  Continue Neurontin.    Seizure..  Continue Trileptal.  Continue Keppra.    Code Status: full code     I discussed the patient's findings and my recommendations with: patient    Estimated length of stay:     Vish Latham MD   09/07/19   4:17 AM              Electronically signed by Vish Latham MD at 9/7/2019  4:51 AM          Emergency Department Notes      Amada Reece, RN at 9/7/2019  1:01 AM        Resp at bedside drawing Amada Zimmer, RN  09/07/19 0101      Electronically signed by Amada Reece, RN at 9/7/2019  1:01 AM     Ramón Avery MD at 9/7/2019  3:53 AM      Procedure Orders    1. ECG 12 Lead [[id number]] ordered by Ramón Avery MD at 09/06/19 2305     2. ECG 12 Lead [[id number]] ordered by Ramón Avery MD at 09/07/19 0154                Subjective   58 y/o male with history of CAD, CABG in July by Dr. Tang, HLD and DM who has also had afib who was actually cardioverted by  Dr. Llamas yesterday 0930 who states he was doing well after the procedure but then began to note fullness in his epigastrium that relieved with nitro but then was replaced with SOB. He noted prior to this nausea and several episodes of vomiting as well as diaphoresis. He notes a dry cough, orthopnea and LE swelling (this has apparently improved). He denies any history of COPD of CHF, fevers, chills, falls, trauma, history of PE or DVT, recent cancer treatments or immobilizations. Of note he has been to the ED three times recently for similar issues. He arrives in [facility]         Family, social and past history reviewed as below, prior documentation of H and Ps and other documentation are reviewed:    Past Medical History:  No date: Arthritis  No date: Asthma  No date: Carotid disease, bilateral (CMS/[facility])  No date: Chest pain  No date: Chronic ischemic heart disease  No date: Chronic sinusitis  No date: Maribell bullosa  No date: Coronary artery disease  No date: Deviated septum  No date: Diabetes mellitus (CMS/[facility])  No date: Difficulty urinating  No date: Diverticulitis  No date: Enlarged prostate  No date: Fatty liver  No date: GERD (gastroesophageal reflux disease)  No date: Hyperlipidemia  No date: Hypertension  No date: Hypertrophy of nasal turbinates  No date: Keratoderma  No date: Kidney stone  No date: Migraine  No date: Murmur, heart  No date: Myocardial infarction (CMS/[facility])  No date: Obesity  No date: PONV (postoperative nausea and vomiting)  No date: Psoriasis  No date: Seizures (CMS/HCC)  No date: Sinus congestion  No date: Sleep apnea  No date: SOB (shortness of breath)  No date: Stroke (CMS/HCC)  No date: UTI (urinary tract infection)    Past Surgical History:  01/2016: CARDIAC CATHETERIZATION      Comment:  Dr. Broadbent; widely patent previously placed stents in               the left anterior descending and obstructive disease                involving the diagonal branch which was treated  medically  7/14/2017: CARDIAC CATHETERIZATION; N/A      Comment:  Procedure: Left Heart Cath;  Surgeon: Wade Ramey MD;                 Location:  [facility] CATH [facility];  Service:   10/15/2018: CARDIAC CATHETERIZATION; Left      Comment:  Procedure: Cardiac Catheterization/Vascular Study;                 Surgeon: Wade Ramey MD;  Location:  [facility] CATH                INVASIVE LOCATION;  Service: Cardiology  10/15/2018: CARDIAC CATHETERIZATION      Comment:  Procedure: Functional Flow [place];  Surgeon: Wade Ramey MD;  Location:  [facility] CATH INVAS[facility];                 Service: Cardiology  10/15/2018: CARDIAC CATHETERIZATION; N/A      Comment:  Procedure: Left ventriculography;  Surgeon: Wade Ramey MD;  Location:  [facility];                 Service: Cardiology  6/26/2019: CARDIAC CATHETERIZATION; Left      Comment:  Procedure: Cardiac Catheterization/Vascular Study VEL OK               HE WILL WAIT 1 YEAR FOR SHOULDER SURGERY ;  Surgeon:                Wade Ramey MD;  Location:  [facility]                LOCATION;  Service: Cardiology  No date: CHOLECYSTECTOMY  8/1/2018: CHOLECYSTECTOMY WITH INTRAOPERATIVE CHOLANGIOGRAM; N/A      Comment:  Procedure: CHOLECYSTECTOMY LAPAROSCOPIC INTRAOPERATIVE                CHOLANGIOGRAM;  Surgeon: Shane Ann MD;  Location:                [facility] OR;  Service: General  No date: CORONARY ANGIOPLASTY  7/6/2019: CORONARY ARTERY BYPASS GRAFT; N/A      Comment:  Procedure: CABG X2 WITH LIMA, LEFT LEG OVH, AND                PLACEMENT OF LEFT FEMORAL ARTERIAL LINE;  Surgeon: Steven Tang MD;  Location:  [facility] OR;  Service:                Cardiothoracic  No date: CORONARY STENT PLACEMENT      Comment:  x 6  12/13/2017: ENDOSCOPIC FUNCTIONAL SINUS SURGERY (FESS); Bilateral      Comment:  Procedure: PROCEDURE PERFORMED:  Bilateral functional                endoscopic anterior  ethmoidectomy with bilateral middle                meatal antrostomy Septoplasty Right kathia bullosa                resection Bilateral inferior turbinate reduction via                Coblation;  Surgeon: Mayank Ibarra MD;  Location:                [facility] OR;  Service:   2018: ENDOSCOPY; N/A      Comment:  Procedure: ESOPHAGOGASTRODUODENOSCOPY WITH ANESTHESIA;                 Surgeon: Benitez Mas MD;  Location: [facility]                ENDOSCOPY;  Service: Gastroenterology  No date: HERNIA REPAIR      Comment:  x2 inguinal area  No date: KIDNEY STONE SURGERY  No date: KNEE SURGERY; Right  No date: OTHER SURGICAL HISTORY      Comment:  urolift  No date: PROSTATE SURGERY      Comment:  Dr. Badillo -   No date: THUMB AMPUTATION; Left      Comment:  partial  No date: TOE AMPUTATION; Right      Comment:  big    Social History    Socioeconomic History      Marital status:       Spouse name: Not on file      Number of children: Not on file      Years of education: Not on file      Highest education level: Not on file    Tobacco Use      Smoking status: Former Smoker        Years: 4.50        Types: Cigarettes        Quit date:         Years since quittin.6      Smokeless tobacco: Former User        Types: Chew        Quit date:     Substance and Sexual Activity      Alcohol use: No      Drug use: No      Sexual activity: Defer      Family history: reviewed and noncontributory             Review of Systems   All other systems reviewed and are negative.      Past Medical History:   Diagnosis Date   • Arthritis    • Asthma    • Carotid disease, bilateral (CMS/HCC)    • Chest pain    • Chronic ischemic heart disease    • Chronic sinusitis    • Kathia bullosa    • Coronary artery disease    • Deviated septum    • Diabetes mellitus (CMS/HCC)    • Difficulty urinating    • Diverticulitis    • Enlarged prostate    • Fatty liver    • GERD (gastroesophageal reflux disease)    • Hyperlipidemia     • Hypertension    • Hypertrophy of nasal turbinates    • Keratoderma    • Kidney stone    • Migraine    • Murmur, heart    • Myocardial infarction (CMS/HCC)    • Obesity    • PONV (postoperative nausea and vomiting)    • Psoriasis    • Seizures (CMS/HCC)    • Sinus congestion    • Sleep apnea    • SOB (shortness of breath)    • Stroke (CMS/HCC)    • UTI (urinary tract infection)        Allergies   Allergen Reactions   • Flagyl [Metronidazole] Hives   • Atorvastatin Other (See Comments)     Muscle cramps   • Ciprofloxacin Hives       Past Surgical History:   Procedure Laterality Date   • CARDIAC CATHETERIZATION  01/2016    Dr. Broadbent; widely patent previously placed stents in the left anterior descending and obstructive disease involving the diagonal branch which was treated medically   • CARDIAC CATHETERIZATION N/A 7/14/2017    Procedure: Left Heart Cath;  Surgeon: Wade Ramey MD;  Location:  [facility];  Service:    • CARDIAC CATHETERIZATION Left 10/15/2018    Procedure: Cardiac Catheterization/Vascular Study;  Surgeon: Wade Ramey MD;  Location:  [facility];  Service: Cardiology   • CARDIAC CATHETERIZATION  10/15/2018    Procedure: Functional Flow [place];  Surgeon: Wade Ramey MD;  Location:  [facility];  Service: Cardiology   • CARDIAC CATHETERIZATION N/A 10/15/2018    Procedure: Left ventriculography;  Surgeon: Wade Ramey MD;  Location:  [facility];  Service: Cardiology   • CARDIAC CATHETERIZATION Left 6/26/2019    Procedure: Cardiac Catheterization/Vascular Study VEL OK  HE WILL WAIT 1 YEAR FOR SHOULDER SURGERY ;  Surgeon: Wade Ramey MD;  Location:  [facility];  Service: Cardiology   • CHOLECYSTECTOMY     • CHOLECYSTECTOMY WITH INTRAOPERATIVE CHOLANGIOGRAM N/A 8/1/2018    Procedure: CHOLECYSTECTOMY LAPAROSCOPIC INTRAOPERATIVE CHOLANGIOGRAM;  Surgeon: Shane Ann MD;  Location: [facility] OR;  Service:  General   • CORONARY ANGIOPLASTY     • CORONARY ARTERY BYPASS GRAFT N/A 2019    Procedure: CABG X2 WITH LIMA, LEFT LEG OVH, AND PLACEMENT OF LEFT FEMORAL ARTERIAL LINE;  Surgeon: Steven Tang MD;  Location: [facility] OR;  Service: Cardiothoracic   • CORONARY STENT PLACEMENT      x 6   • ENDOSCOPIC FUNCTIONAL SINUS SURGERY (FESS) Bilateral 2017    Procedure: PROCEDURE PERFORMED:  Bilateral functional endoscopic anterior ethmoidectomy with bilateral middle meatal antrostomy Septoplasty Right kathia bullosa resection Bilateral inferior turbinate reduction via Coblation;  Surgeon: Mayank Ibarra MD;  Location: [facility] OR;  Service:    • ENDOSCOPY N/A 2018    Procedure: ESOPHAGOGASTRODUODENOSCOPY WITH ANESTHESIA;  Surgeon: Benitez Mas MD;  Location: [facility] ENDOSCOPY;  Service: Gastroenterology   • HERNIA REPAIR      x2 inguinal area   • KIDNEY STONE SURGERY     • KNEE SURGERY Right    • OTHER SURGICAL HISTORY      urolift   • PROSTATE SURGERY      Dr. Badillo -    • THUMB AMPUTATION Left     partial   • TOE AMPUTATION Right     big       Family History   Problem Relation Age of Onset   • Heart disease Father    • No Known Problems Mother        Social History     Socioeconomic History   • Marital status:      Spouse name: Not on file   • Number of children: Not on file   • Years of education: Not on file   • Highest education level: Not on file   Tobacco Use   • Smoking status: Former Smoker     Years: 4.50     Types: Cigarettes     Last attempt to quit:      Years since quittin.6   • Smokeless tobacco: Former User     Types: Chew     Quit date:    Substance and Sexual Activity   • Alcohol use: No   • Drug use: No   • Sexual activity: Defer           Objective   Physical Exam   Constitutional: He appears well-developed and well-nourished.   HENT:   Head: Normocephalic.   Mouth/Throat: Oropharynx is clear and moist. No oropharyngeal exudate or posterior oropharyngeal  edema.   Eyes: EOM are normal. Pupils are equal, round, and reactive to light.   Neck: Normal range of motion. Neck supple. No JVD present.   Cardiovascular: Normal rate and regular rhythm.   Pulmonary/Chest: Effort normal. No accessory muscle usage. No respiratory distress.   Abdominal: Bowel sounds are normal. He exhibits no distension, no ascites and no mass. There is no tenderness. There is no rebound.   Musculoskeletal:        Right lower leg: Normal.        Left lower leg: Normal.   Neurological: He is alert.   Skin: Skin is warm. Capillary refill takes less than 2 seconds.   Psychiatric: He has a normal mood and affect. His behavior is normal.   Vitals reviewed.      ECG 12 Lead    Date/Time: 9/7/2019 10:21 PM  Performed by: Ramón Avery MD  Authorized by: Ramón Avery MD   Interpreted by physician  Comparison: compared with previous ECG   Similar to previous ECG  Rhythm: sinus rhythm  Rate: normal  BPM: 79  QRS axis: normal  Conduction: 1st degree    ECG 12 Lead    Date/Time: 9/7/2019 2:47 AM  Performed by: Ramón Avery MD  Authorized by: Ramón Avery MD   Interpreted by physician  Rhythm: sinus rhythm  Rate: normal  BPM: 76  QRS axis: normal  Conduction: 1st degree                ED Course  ED Course as of Sep 07 0406   Sat Sep 07, 2019   0402 CT shows no PE but bilateral moderate pleural effusions, pulmonary edema.   [JH]   0403 He has diastolic dysfunction.   [JH]   0404 Given hypoxia and pulmonary edema will provide lasix and admit.   []      ED Course User Index  [] Ramón Avery MD      XR Chest 1 View    (Results Pending)   CT Angiogram Chest With Contrast    (Results Pending)     Labs Reviewed   COMPREHENSIVE METABOLIC PANEL - Abnormal; Notable for the following components:       Result Value    BUN 24 (*)     CO2 22.0 (*)     ALT (SGPT) 87 (*)     AST (SGOT) 87 (*)     eGFR Non  Amer 59 (*)     All other components within normal limits     Narrative:     GFR Normal >60  Chronic Kidney Disease <60  Kidney Failure <15   CBC WITH AUTO DIFFERENTIAL - Abnormal; Notable for the following components:    WBC 10.82 (*)     MCHC 31.1 (*)     Neutrophils, Absolute 7.54 (*)     All other components within normal limits   PROTIME-INR - Abnormal; Notable for the following components:    Protime 15.7 (*)     INR 1.21 (*)     All other components within normal limits   D-DIMER, QUANTITATIVE - Abnormal; Notable for the following components:    D-Dimer, Quantitative 2.01 (*)     All other components within normal limits    Narrative:     Reference Range is 0-0.50 mg/L FEU. However, results <0.50 mg/L FEU tends to rule out DVT or PE. Results >0.50 mg/L FEU are not useful in predicting absence or presence of DVT or PE.   BLOOD GAS, ARTERIAL - Abnormal; Notable for the following components:    pO2, Arterial 68.0 (*)     Base Excess, Arterial -2.7 (*)     O2 Saturation, Arterial 90.3 (*)     All other components within normal limits   TROPONIN (IN-HOUSE) - Normal   TROPONIN (IN-HOUSE) - Normal   APTT - Normal   BNP (IN-HOUSE) - Normal   TROPONIN (IN-HOUSE) - Normal   RAINBOW DRAW    Narrative:     The following orders were created for panel order [place] Draw.  Procedure                               Abnormality         Status                     ---------                               -----------         ------                     Light Blue Top[[id number]]                                   Final result               Green Top (Gel)[[id number]]                                  Final result               Lavender Top[[id number]]                                     Final result               Red Top[[id number]]                                          Final result                 Please view results for these tests on the individual orders.   RAINBOW DRAW    Narrative:     The following orders were created for panel order [place] Draw.  Procedure                                Abnormality         Status                     ---------                               -----------         ------                     Light Blue Top[[id number]]                                                              Green Top (Gel)[[id number]]                                  Final result               Lavender Top[[id number]]                                     Final result               Red Top[[id number]]                                                                       Please view results for these tests on the individual orders.   BLOOD GAS, ARTERIAL   LIGHT BLUE TOP   GREEN TOP   LAVENDER TOP   RED TOP   CBC AND DIFFERENTIAL    Narrative:     The following orders were created for panel order CBC & Differential.  Procedure                               Abnormality         Status                     ---------                               -----------         ------                     CBC Auto Differential[[id number]]        Abnormal            Final result                 Please view results for these tests on the individual orders.   GREEN TOP   LAVENDER TOP             · Left ventricular wall thickness is consistent with moderate concentric hypertrophy.  · Estimated EF = 65%.  · Left ventricular diastolic dysfunction.  · Mild to moderate aortic valve regurgitation is present.  · Mild aortic valve stenosis is present. Aortic valve leaflets not well visualized  · No evidence of pulmonary hypertension is present.               MDM    Final diagnoses:   Acute congestive heart failure, unspecified heart failure type (CMS/HCC)   Hypoxia              Ramón Avery MD  09/07/19 0406      Electronically signed by Ramón Avery MD at 9/7/2019  4:06 AM       Hospital Medications (all)       Dose Frequency Start End    aspirin chewable tablet 324 mg 324 mg Once 9/6/2019 9/6/2019    Sig - Route: Chew 4 tablets 1 (One) Time. - Oral    Cosign for Ordering: Accepted by Ramón Avery MD on  9/7/2019  7:49 AM    furosemide (LASIX) injection 60 mg 60 mg Once 9/7/2019 9/7/2019    Sig - Route: Infuse 6 mL into a venous catheter 1 (One) Time. - Intravenous    iopamidol (ISOVUE-370) 76 % injection 100 mL 100 mL Once in Imaging 9/7/2019 9/7/2019    Sig - Route: Infuse 100 mL into a venous catheter Once. - Intravenous    nitroglycerin (NITROSTAT) ointment 1 inch 1 inch Once 9/7/2019 9/7/2019    Sig - Route: Apply 1 inch topically to the appropriate area as directed 1 (One) Time. - Topical    ondansetron (ZOFRAN) injection 4 mg 4 mg Once 9/7/2019 9/7/2019    Sig - Route: Infuse 2 mL into a venous catheter 1 (One) Time. - Intravenous    albuterol (PROVENTIL) nebulizer solution 0.083% 2.5 mg/3mL (Discontinued) 2.5 mg Every 6 Hours PRN 9/7/2019 9/8/2019    Sig - Route: Take 2.5 mg by nebulization Every 6 (Six) Hours As Needed for Wheezing. - Nebulization    Reason for Discontinue: Patient Discharge    albuterol sulfate HFA (PROVENTIL HFA;VENTOLIN HFA;PROAIR HFA) inhaler 2 puff (Discontinued) 2 puff Every 6 Hours PRN 9/7/2019 9/7/2019    Sig - Route: Inhale 2 puffs Every 6 (Six) Hours As Needed for Wheezing. - Inhalation    Reason for Discontinue: Formulary change    amiodarone (PACERONE) tablet 200 mg (Discontinued) 200 mg 2 Times Daily 9/7/2019 9/7/2019    Sig - Route: Take 1 tablet by mouth 2 (Two) Times a Day. - Oral    apixaban (ELIQUIS) tablet 5 mg (Discontinued) 5 mg Every 12 Hours Scheduled 9/7/2019 9/8/2019    Sig - Route: Take 1 tablet by mouth Every 12 (Twelve) Hours. - Oral    Reason for Discontinue: Patient Discharge    aspirin chewable tablet 81 mg (Discontinued) 81 mg Daily 9/7/2019 9/8/2019    Sig - Route: Chew 1 tablet Daily. - Oral    Reason for Discontinue: Patient Discharge    calcium polycarbophil (FIBERCON) tablet 625 mg (Discontinued) 625 mg Daily 9/7/2019 9/8/2019    Sig - Route: Take 1 tablet by mouth Daily. - Oral    Reason for Discontinue: Patient Discharge    famotidine (PEPCID)  injection 20 mg (Discontinued) 20 mg 2 Times Daily 9/7/2019 9/8/2019    Sig - Route: Infuse 2 mL into a venous catheter 2 (Two) Times a Day. - Intravenous    Reason for Discontinue: Patient Discharge    furosemide (LASIX) injection 40 mg (Discontinued) 40 mg Once 9/7/2019 9/7/2019    Sig - Route: Infuse 4 mL into a venous catheter 1 (One) Time. - Intravenous    furosemide (LASIX) injection 40 mg (Discontinued) 40 mg Every 12 Hours 9/7/2019 9/7/2019    Sig - Route: Infuse 4 mL into a venous catheter Every 12 (Twelve) Hours. - Intravenous    furosemide (LASIX) tablet 40 mg (Discontinued) 40 mg Daily 9/8/2019 9/8/2019    Sig - Route: Take 1 tablet by mouth Daily. - Oral    Reason for Discontinue: Patient Discharge    gabapentin (NEURONTIN) capsule 100 mg (Discontinued) 100 mg Nightly 9/7/2019 9/8/2019    Sig - Route: Take 1 capsule by mouth Every Night. - Oral    Reason for Discontinue: Patient Discharge    insulin aspart (novoLOG FLEXPEN) sc pen 30 Units (Discontinued) 30 Units Daily With Lunch 9/7/2019 9/8/2019    Sig - Route: Inject 30 Units under the skin into the appropriate area as directed Daily With Lunch. - Subcutaneous    Reason for Discontinue: Patient Discharge    insulin aspart (novoLOG FLEXPEN) sc pen 34 Units (Discontinued) 34 Units Daily With Breakfast 9/8/2019 9/8/2019    Sig - Route: Inject 34 Units under the skin into the appropriate area as directed Daily With Breakfast. - Subcutaneous    Reason for Discontinue: Patient Discharge    insulin aspart (novoLOG FLEXPEN) sc pen 34 Units (Discontinued) 34 Units Nightly 9/7/2019 9/8/2019    Sig - Route: Inject 34 Units under the skin into the appropriate area as directed Every Night. - Subcutaneous    Reason for Discontinue: Patient Discharge    insulin detemir (LEVEMIR) injection 30 Units (Discontinued) 30 Units Every 12 Hours Scheduled 9/7/2019 9/7/2019    Sig - Route: Inject 30 Units under the skin into the appropriate area as directed Every 12 (Twelve)  Hours. - Subcutaneous    Reason for Discontinue: *Re-Entry    Insulin Glargine (LANTUS SOLOSTAR) injection pen solution pen-injector 60 Units (Discontinued) 60 Units Daily 9/7/2019 9/8/2019    Sig - Route: Inject 60 Units under the skin into the appropriate area as directed Daily. - Subcutaneous    Reason for Discontinue: Patient Discharge    Insulin Glargine (LANTUS SOLOSTAR) injection pen solution pen-injector 70 Units (Discontinued) 70 Units Nightly 9/7/2019 9/8/2019    Sig - Route: Inject 70 Units under the skin into the appropriate area as directed Every Night. - Subcutaneous    Reason for Discontinue: Patient Discharge    ipratropium-albuterol (DUO-NEB) nebulizer solution 3 mL (Discontinued) 3 mL Once 9/7/2019 9/7/2019    Sig - Route: Take 3 mL by nebulization 1 (One) Time. - Nebulization    lamoTRIgine (LaMICtal) tablet 200 mg (Discontinued) 200 mg 2 Times Daily 9/7/2019 9/8/2019    Sig - Route: Take 2 tablets by mouth 2 (Two) Times a Day. - Oral    Reason for Discontinue: Patient Discharge    levETIRAcetam (KEPPRA) tablet 1,500 mg (Discontinued) 1,500 mg Daily 9/7/2019 9/8/2019    Sig - Route: Take 3 tablets by mouth Daily. - Oral    Reason for Discontinue: Patient Discharge    levETIRAcetam (KEPPRA) tablet 2,000 mg (Discontinued) 2,000 mg 2 Times Daily 9/7/2019 9/7/2019    Sig - Route: Take 4 tablets by mouth 2 (Two) Times a Day. - Oral    levETIRAcetam (KEPPRA) tablet 2,000 mg (Discontinued) 2,000 mg Nightly 9/7/2019 9/8/2019    Sig - Route: Take 4 tablets by mouth Every Night. - Oral    Reason for Discontinue: Patient Discharge    levETIRAcetam (KEPPRA) tablet 500 mg (Discontinued) 500 mg Take As Directed 9/7/2019 9/7/2019    Sig - Route: Take 1 tablet by mouth Take As Directed. - Oral    Liraglutide (VICTOZA) injection 1.2 mg (Discontinued) 1.2 mg Nightly 9/7/2019 9/8/2019    Sig - Route: Inject 1.2 mg under the skin into the appropriate area as directed Every Night. - Subcutaneous    Reason for  Discontinue: Patient Discharge    metoprolol tartrate (LOPRESSOR) tablet 25 mg (Discontinued) 25 mg 2 Times Daily 9/7/2019 9/8/2019    Sig - Route: Take 1 tablet by mouth 2 (Two) Times a Day. - Oral    Reason for Discontinue: Patient Discharge    nitroglycerin (NITROSTAT) SL tablet 0.4 mg (Discontinued) 0.4 mg Every 5 Minutes PRN 9/7/2019 9/8/2019    Sig - Route: Place 1 tablet under the tongue Every 5 (Five) Minutes As Needed for Chest Pain. - Sublingual    Reason for Discontinue: Patient Discharge    ondansetron (ZOFRAN) injection 4 mg (Discontinued) 4 mg Every 6 Hours PRN 9/7/2019 9/8/2019    Sig - Route: Infuse 2 mL into a venous catheter Every 6 (Six) Hours As Needed for Nausea or Vomiting. - Intravenous    Reason for Discontinue: Patient Discharge    Pharmacy Consult (Discontinued)  Continuous PRN 9/7/2019 9/7/2019    Sig - Route: Continuous As Needed for Consult. - Does not apply    rosuvastatin (CRESTOR) tablet 20 mg (Discontinued) 20 mg Nightly 9/7/2019 9/7/2019    Sig - Route: Take 1 tablet by mouth Every Night. - Oral    sodium chloride 0.9 % flush 10 mL (Discontinued) 10 mL As Needed 9/6/2019 9/7/2019    Sig - Route: Infuse 10 mL into a venous catheter As Needed for Line Care. - Intravenous    Cosign for Ordering: Accepted by Ramón Avery MD on 9/7/2019  7:49 AM    sodium chloride 0.9 % flush 10 mL (Discontinued) 10 mL Every 12 Hours Scheduled 9/7/2019 9/8/2019    Sig - Route: Infuse 10 mL into a venous catheter Every 12 (Twelve) Hours. - Intravenous    Reason for Discontinue: Patient Discharge    sodium chloride 0.9 % flush 10 mL (Discontinued) 10 mL As Needed 9/7/2019 9/8/2019    Sig - Route: Infuse 10 mL into a venous catheter As Needed for Line Care. - Intravenous    Reason for Discontinue: Patient Discharge             Physician Progress Notes (last 48 hours) (Notes from 9/7/2019  3:34 PM through 9/9/2019  3:34 PM)      Azucena Acosta APRN at 9/8/2019  3:32 PM     Attestation signed by  Cyril Sue MD at 9/8/2019  8:53 PM    I have reviewed the documentation above and agree.                    [facility] -  Progress Note     LOS: 1 day   Patient Care Team:  Vinayak Correia PA as PCP - General (Physician Assistant)  Vinayak Correia PA as PCP - Family Medicine  Wade Ramey MD as PCP - Claims Attributed  Wade Ramey MD as Cardiologist (Cardiology)  Sumanth Badillo MD as Consulting Physician (Urology)  Cyril Blake MD as Referring Physician (Otolaryngology)  Mayank Ibarra MD as Consulting Physician (Otolaryngology)  Hamilton White MD as Consulting Physician (Neurology)    Chief Complaint:pulmonary edema    Subjective     Interval History:   Patient it up walking the salas when I came to assess him. He states he feels fine and is ready to go home. He denies chest pain, shortness of breath and palpitations.     Review of Systems:   Review of Systems   Constitutional: Negative for chills, diaphoresis, fatigue and unexpected weight change.   HENT: Negative for nosebleeds.    Respiratory: Negative for cough, chest tightness, shortness of breath and wheezing.    Cardiovascular: Negative for chest pain, palpitations and leg swelling.   Gastrointestinal: Negative for anal bleeding, blood in stool, diarrhea and nausea.   Neurological: Negative for dizziness, syncope and light-headedness.       Objective     Vital Sign Min/Max for last 24 hours  Temp  Min: 98 °F (36.7 °C)  Max: 98.2 °F (36.8 °C)   BP  Min: 117/72  Max: 127/74   Pulse  Min: 66  Max: 70   Resp  Min: 16  Max: 18   SpO2  Min: 94 %  Max: 97 %   No Data Recorded   No Data Recorded   Tele:NSR 66-78 with first degree      09/07/19  0549   Weight: 106 kg (234 lb 6.4 oz)         Intake/Output Summary (Last 24 hours) at 9/8/2019 1532  Last data filed at 9/8/2019 1404  Gross per 24 hour   Intake 360 ml   Output 1250 ml   Net -890 ml         Physical Exam:  Physical Exam   Constitutional: He is  oriented to person, place, and time. He appears well-developed and well-nourished. No distress.   HENT:   Head: Normocephalic.   Mouth/Throat: No oropharyngeal exudate.   Eyes: Conjunctivae and EOM are normal. Pupils are equal, round, and reactive to light. No scleral icterus.   Neck: Normal range of motion. Neck supple.   Cardiovascular: Normal rate, regular rhythm, normal heart sounds and intact distal pulses.   No murmur heard.  Pulmonary/Chest: Effort normal and breath sounds normal. No respiratory distress. He has no wheezes. He has no rales. He exhibits no tenderness.   Abdominal: Soft. Bowel sounds are normal. He exhibits no distension. There is no tenderness.   Musculoskeletal: Normal range of motion. He exhibits no edema.   Neurological: He is alert and oriented to person, place, and time. He has normal reflexes.   Skin: Skin is warm and dry. No rash noted. He is not diaphoretic. No erythema. No pallor.   Psychiatric: He has a normal mood and affect. His behavior is normal.   Vitals reviewed.       Results Review:   Lab Results (last 72 hours)     Procedure Component Value Units Date/Time    POC Glucose Once [[id number]]  (Abnormal) Collected:  09/08/19 1108    Specimen:  Blood Updated:  09/08/19 1119     Glucose 142 mg/dL      Comment: : [id number] PrestodiagMeter ID: [id number]       POC Glucose Once [[id number]]  (Normal) Collected:  09/08/19 0726    Specimen:  Blood Updated:  09/08/19 0737     Glucose 102 mg/dL      Comment: : [id number] PrestodiagMeter ID: [id number]       Comprehensive Metabolic Panel [[id number]]  (Abnormal) Collected:  09/08/19 0527    Specimen:  Blood Updated:  09/08/19 0649     Glucose 98 mg/dL      BUN 21 mg/dL      Creatinine 1.15 mg/dL      Sodium 139 mmol/L      Potassium 4.8 mmol/L      Chloride 106 mmol/L      CO2 25.0 mmol/L      Calcium 9.4 mg/dL      Total Protein 6.6 g/dL      Albumin 3.70 g/dL      ALT (SGPT) 89 U/L      AST (SGOT) 64 U/L      Alkaline  Phosphatase 79 U/L      Total Bilirubin 0.9 mg/dL      eGFR Non African Amer 65 mL/min/1.73      Globulin 2.9 gm/dL      A/G Ratio 1.3 g/dL      BUN/Creatinine Ratio 18.3     Anion Gap 8.0 mmol/L     Narrative:       GFR Normal >60  Chronic Kidney Disease <60  Kidney Failure <15    POC Glucose Once [[id number]]  (Abnormal) Collected:  09/07/19 2056    Specimen:  Blood Updated:  09/07/19 2107     Glucose 248 mg/dL      Comment: : [id number] Amaya Carinzhou ID: [id number]       POC Glucose Once [[id number]]  (Abnormal) Collected:  09/07/19 1630    Specimen:  Blood Updated:  09/07/19 1641     Glucose 185 mg/dL      Comment: : [id number] Reynaldo ThomastaliaeMebartolo ID: [id number]       POC Glucose Once [[id number]]  (Abnormal) Collected:  09/07/19 1127    Specimen:  Blood Updated:  09/07/19 1138     Glucose 187 mg/dL      Comment: : [id number] Jacobs JaleeleMebartolo ID: [id number]       Hemoglobin A1c [[id number]]  (Abnormal) Collected:  09/07/19 0642    Specimen:  Blood Updated:  09/07/19 0858     Hemoglobin A1C 6.5 %     Narrative:       Less than 6.0           Non-Diabetic Range  6.0-7.0                 ADA Therapeutic Target  Greater than 7.0        Action Suggested    TSH [[id number]]  (Normal) Collected:  09/07/19 0642    Specimen:  Blood Updated:  09/07/19 0757     TSH 2.020 uIU/mL     Lipid Panel [[id number]]  (Abnormal) Collected:  09/07/19 0642    Specimen:  Blood Updated:  09/07/19 0738     Total Cholesterol 120 mg/dL      Triglycerides 74 mg/dL      HDL Cholesterol 36 mg/dL      LDL Cholesterol  74 mg/dL      LDL/HDL Ratio 1.92    Comprehensive Metabolic Panel [[id number]]  (Abnormal) Collected:  09/07/19 0642    Specimen:  Blood Updated:  09/07/19 0727     Glucose 89 mg/dL      BUN 24 mg/dL      Creatinine 1.19 mg/dL      Sodium 142 mmol/L      Potassium 4.4 mmol/L      Chloride 106 mmol/L      CO2 24.0 mmol/L      Calcium 10.1 mg/dL      Total Protein 8.0 g/dL      Albumin 4.70 g/dL      ALT (SGPT) 109 U/L       AST (SGOT) 100 U/L      Alkaline Phosphatase 104 U/L      Total Bilirubin 1.3 mg/dL      eGFR Non African Amer 62 mL/min/1.73      Globulin 3.3 gm/dL      A/G Ratio 1.4 g/dL      BUN/Creatinine Ratio 20.2     Anion Gap 12.0 mmol/L     Narrative:       GFR Normal >60  Chronic Kidney Disease <60  Kidney Failure <15    CBC Auto Differential [[id number]]  (Abnormal) Collected:  09/07/19 0642    Specimen:  Blood Updated:  09/07/19 0723     WBC 10.30 10*3/mm3      RBC 4.90 10*6/mm3      Hemoglobin 13.1 g/dL      Hematocrit 41.4 %      MCV 84.5 fL      MCH 26.7 pg      MCHC 31.6 g/dL      RDW 13.8 %      RDW-SD 42.8 fl      MPV 12.0 fL      Platelets 192 10*3/mm3      Neutrophil % 76.4 %      Lymphocyte % 15.4 %      Monocyte % 7.1 %      Eosinophil % 0.4 %      Basophil % 0.4 %      Immature Grans % 0.3 %      Neutrophils, Absolute 7.87 10*3/mm3      Lymphocytes, Absolute 1.59 10*3/mm3      Monocytes, Absolute 0.73 10*3/mm3      Eosinophils, Absolute 0.04 10*3/mm3      Basophils, Absolute 0.04 10*3/mm3      Immature Grans, Absolute 0.03 10*3/mm3      nRBC 0.0 /100 WBC     Troponin [[id number]]  (Normal) Collected:  09/07/19 0248    Specimen:  Blood Updated:  09/07/19 0322     Troponin I 0.020 ng/mL     [place] Draw [[id number]] Collected:  09/07/19 0010    Specimen:  Blood Updated:  09/07/19 0141    Narrative:       The following orders were created for panel order [place] Draw.  Procedure                               Abnormality         Status                     ---------                               -----------         ------                     Light Blue Top[[id number]]                                                              Green Top (Gel)[[id number]]                                  Final result               Lavender Top[[id number]]                                     Final result               Red Top[[id number]]                                                                       Please view results for these  tests on the individual orders.    Green Top (Gel) [[id number]] Collected:  09/07/19 0010    Specimen:  Blood Updated:  09/07/19 0121     Extra Tube Hold for add-ons.     Comment: Auto resulted.       Lavender Top [[id number]] Collected:  09/07/19 0010    Specimen:  Blood Updated:  09/07/19 0121     Extra Tube hold for add-on     Comment: Auto resulted       Blood Gas, Arterial [[id number]]  (Abnormal) Collected:  09/07/19 0100    Specimen:  Arterial Blood Updated:  09/07/19 0108     Site Right Brachial     Thien's Test N/A     pH, Arterial 7.390 pH units      pCO2, Arterial 36.0 mm Hg      pO2, Arterial 68.0 mm Hg      Comment: 84 Value below reference range        HCO3, Arterial 21.8 mmol/L      Base Excess, Arterial -2.7 mmol/L      Comment: 84 Value below reference range        O2 Saturation, Arterial 90.3 %      Comment: 84 Value below reference range        Temperature 37.0 C      Barometric Pressure for Blood Gas 751 mmHg      Modality Nasal Cannula     Flow Rate 2.0 lpm      Ventilator Mode NA     Collected by [id number]     Comment: Meter: [id number]     :  [id number]       aPTT [[id number]]  (Normal) Collected:  09/06/19 2239    Specimen:  Blood Updated:  09/07/19 0042     PTT 27.1 seconds     Protime-INR [[id number]]  (Abnormal) Collected:  09/06/19 2239    Specimen:  Blood Updated:  09/07/19 0042     Protime 15.7 Seconds      INR 1.21    D-dimer, Quantitative [[id number]]  (Abnormal) Collected:  09/06/19 2239    Specimen:  Blood Updated:  09/07/19 0042     D-Dimer, Quantitative 2.01 mg/L (FEU)     Narrative:       Reference Range is 0-0.50 mg/L FEU. However, results <0.50 mg/L FEU tends to rule out DVT or PE. Results >0.50 mg/L FEU are not useful in predicting absence or presence of DVT or PE.    BNP [[id number]]  (Normal) Collected:  09/07/19 0010    Specimen:  Blood Updated:  09/07/19 0040     proBNP 732.0 pg/mL     Troponin [[id number]]  (Normal) Collected:  09/07/19 0010    Specimen:  Blood Updated:   09/07/19 0038     Troponin I 0.019 ng/mL     Comprehensive Metabolic Panel [[id number]]  (Abnormal) Collected:  09/07/19 0010    Specimen:  Blood Updated:  09/07/19 0036     Glucose 95 mg/dL      BUN 24 mg/dL      Creatinine 1.24 mg/dL      Sodium 140 mmol/L      Potassium 5.1 mmol/L      Chloride 109 mmol/L      CO2 22.0 mmol/L      Calcium 9.7 mg/dL      Total Protein 7.4 g/dL      Albumin 4.30 g/dL      ALT (SGPT) 87 U/L      AST (SGOT) 87 U/L      Alkaline Phosphatase 87 U/L      Total Bilirubin 0.9 mg/dL      eGFR Non African Amer 59 mL/min/1.73      Globulin 3.1 gm/dL      A/G Ratio 1.4 g/dL      BUN/Creatinine Ratio 19.4     Anion Gap 9.0 mmol/L     Narrative:       GFR Normal >60  Chronic Kidney Disease <60  Kidney Failure <15    [place] Draw [[id number]] Collected:  09/06/19 2239    Specimen:  Blood Updated:  09/06/19 2345    Narrative:       The following orders were created for panel order [place] Draw.  Procedure                               Abnormality         Status                     ---------                               -----------         ------                     Light Blue Top[[id number]]                                   Final result               Green Top (Gel)[[id number]]                                  Final result               Lavender Top[[id number]]                                     Final result               Red Top[[id number]]                                          Final result                 Please view results for these tests on the individual orders.    Light Blue Top [[id number]] Collected:  09/06/19 2239    Specimen:  Blood Updated:  09/06/19 2345     Extra Tube hold for add-on     Comment: Auto resulted       Green Top (Gel) [[id number]] Collected:  09/06/19 2239    Specimen:  Blood Updated:  09/06/19 2345     Extra Tube Hold for add-ons.     Comment: Auto resulted.       Lavender Top [[id number]] Collected:  09/06/19 2239    Specimen:  Blood Updated:  09/06/19 2345     Extra  Tube hold for add-on     Comment: Auto resulted       Red Top [[id number]] Collected:  09/06/19 2239    Specimen:  Blood Updated:  09/06/19 2345     Extra Tube Hold for add-ons.     Comment: Auto resulted.       Troponin [[id number]]  (Normal) Collected:  09/06/19 2239    Specimen:  Blood Updated:  09/06/19 2337     Troponin I 0.020 ng/mL     CBC & Differential [[id number]] Collected:  09/06/19 2239    Specimen:  Blood Updated:  09/06/19 2320    Narrative:       The following orders were created for panel order CBC & Differential.  Procedure                               Abnormality         Status                     ---------                               -----------         ------                     CBC Auto Differential[[id number]]        Abnormal            Final result                 Please view results for these tests on the individual orders.    CBC Auto Differential [[id number]]  (Abnormal) Collected:  09/06/19 2239    Specimen:  Blood Updated:  09/06/19 2320     WBC 10.82 10*3/mm3      RBC 4.92 10*6/mm3      Hemoglobin 13.2 g/dL      Hematocrit 42.4 %      MCV 86.2 fL      MCH 26.8 pg      MCHC 31.1 g/dL      RDW 13.8 %      RDW-SD 43.4 fl      MPV 11.9 fL      Platelets 209 10*3/mm3      Neutrophil % 69.6 %      Lymphocyte % 21.7 %      Monocyte % 6.3 %      Eosinophil % 1.7 %      Basophil % 0.4 %      Immature Grans % 0.3 %      Neutrophils, Absolute 7.54 10*3/mm3      Lymphocytes, Absolute 2.35 10*3/mm3      Monocytes, Absolute 0.68 10*3/mm3      Eosinophils, Absolute 0.18 10*3/mm3      Basophils, Absolute 0.04 10*3/mm3      Immature Grans, Absolute 0.03 10*3/mm3      nRBC 0.0 /100 WBC               Echo EF Estimated  Lab Results   Component Value Date    ECHOEFEST 60 07/07/2019         Cath Ejection Fraction Quantitative  No results found for: [facility]        Medication Review: yes  Current Facility-Administered Medications   Medication Dose Route Frequency Provider Last Rate Last Dose   • albuterol  (PROVENTIL) nebulizer solution 0.083% 2.5 mg/3mL  2.5 mg Nebulization Q6H PRN Vish Latham MD       • apixaban (ELIQUIS) tablet 5 mg  5 mg Oral Q12H Vish Latham MD   5 mg at 09/08/19 0858   • aspirin chewable tablet 81 mg  81 mg Oral Daily Vish Latham MD   81 mg at 09/08/19 0859   • calcium polycarbophil (FIBERCON) tablet 625 mg  625 mg Oral Daily Vish Latham MD   625 mg at 09/08/19 0907   • famotidine (PEPCID) injection 20 mg  20 mg Intravenous BID Vish Latham MD   20 mg at 09/08/19 0908   • furosemide (LASIX) tablet 40 mg  40 mg Oral Daily Azucena Acosta APRN   40 mg at 09/08/19 0859   • gabapentin (NEURONTIN) capsule 100 mg  100 mg Oral Nightly Vish Latham MD   100 mg at 09/07/19 2102   • insulin aspart (novoLOG FLEXPEN) sc pen 30 Units  30 Units Subcutaneous Daily With Lunch Gregor Bell MD   30 Units at 09/08/19 1301   • insulin aspart (novoLOG FLEXPEN) sc pen 34 Units  34 Units Subcutaneous Daily With Breakfast Gregor Bell MD   34 Units at 09/08/19 0900   • insulin aspart (novoLOG FLEXPEN) sc pen 34 Units  34 Units Subcutaneous Nightly Gregor Bell MD   34 Units at 09/07/19 2057   • Insulin Glargine (LANTUS SOLOSTAR) injection pen solution pen-injector 60 Units  60 Units Subcutaneous Daily Gregor Bell MD   60 Units at 09/08/19 0900   • Insulin Glargine (LANTUS SOLOSTAR) injection pen solution pen-injector 70 Units  70 Units Subcutaneous Nightly Gregor Bell MD   70 Units at 09/07/19 2057   • lamoTRIgine (LaMICtal) tablet 200 mg  200 mg Oral BID Vish Latham MD   200 mg at 09/08/19 0858   • levETIRAcetam (KEPPRA) tablet 1,500 mg  1,500 mg Oral Daily Gregor Bell MD   1,500 mg at 09/08/19 0858   • levETIRAcetam (KEPPRA) tablet 2,000 mg  2,000 mg Oral Nightly Gregor Bell MD   2,000 mg at 09/07/19 2053   • Liraglutide (VICTOZA) injection 1.2 mg  1.2 mg Subcutaneous Nightly Gregor Bell  MD Deni   1.2 mg at 09/07/19 2054   • metoprolol tartrate (LOPRESSOR) tablet 25 mg  25 mg Oral BID Vish Latham MD   25 mg at 09/08/19 0859   • nitroglycerin (NITROSTAT) SL tablet 0.4 mg  0.4 mg Sublingual Q5 Min PRN Vish Latham MD       • ondansetron (ZOFRAN) injection 4 mg  4 mg Intravenous Q6H PRN Vish Latham MD       • sodium chloride 0.9 % flush 10 mL  10 mL Intravenous Q12H Vish Latham MD   10 mL at 09/08/19 0908   • sodium chloride 0.9 % flush 10 mL  10 mL Intravenous PRN Vish Latham MD         Assessment/Plan       Type 2 diabetes mellitus without complication, with long-term current use of insulin (CMS/[facility])    Essential hypertension    Coronary artery disease involving native coronary artery of native heart with angina pectoris (CMS/[facility])    Diabetic peripheral neuropathy (CMS/[facility])    History of seizure    Atrial flutter (CMS/[facility]) with cardioversion on Sept 5, 2019    Acute diastolic congestive heart failure (CMS/[facility])    Fluid overload    Pleural effusion    History of hyperlipidemia    Respiratory distress    Plan:  -ok for d/c from cardiology standpoint  -continue to hold amio and statin.   -f/u with Dr. Ramey 9/13. Will need to have CMP to assess LFTs prior to visit. I will order.   - will send home with PRN lasix 40mg to take daily for swelling, weight gain or shortness of breath. This can be adjusted if needed at follow up. I educated the wife and patient on how to take PRN lasix for 2-3 lb weight gain in 3 days or 5lbs in 1 week.   -continue Eliquis, ASA, and lopressor  -patient is ok to resume cardiac rehab    Further recommendations per Dr. Sue who is covering for Dr. Ramey.     Azucena Acosta, APRN  09/08/19  3:32 PM                    Electronically signed by Cyril Sue MD at 9/8/2019  8:53 PM       Consult Notes (last 48 hours) (Notes from 9/7/2019  3:34 PM through 9/9/2019  3:34 PM)     No notes of this type exist for this encounter.           Discharge Summary       Gregor Bell MD at 9/8/2019  1:05 PM              [facility]  DISCHARGE SUMMARY       Date of Admission: 9/6/2019  Date of Discharge:  9/8/2019  Primary Care Physician: Vinayak Correia PA    Discharge Diagnoses:  Active Hospital Problems    Diagnosis   • Fluid overload   • Pleural effusion   • History of hyperlipidemia   • Respiratory distress   • Acute diastolic congestive heart failure (CMS/HCC)   • Atrial flutter (CMS/HCC) with cardioversion on Sept 5, 2019   • History of seizure   • Diabetic peripheral neuropathy (CMS/HCC)   • Coronary artery disease involving native coronary artery of native heart with angina pectoris (CMS/HCC)   • Essential hypertension   • Type 2 diabetes mellitus without complication, with long-term current use of insulin (CMS/HCC)            Presenting Problem/History of Present Illness:  Acute congestive heart failure, unspecified heart failure type (CMS/HCC) [I50.9]         Hospital Course   Patient was recently discharged from cardiology in September 6.  He was admitted on September 5 due to arrhythmia (atrial flutter with rapid ventricular response of 122 150).  Patient reportedly received 20 mg bolus of Cardizem.  Ischemic work-up for chest pain showed negative enzymes and no EKG changes to suggest ischemia.  Patient underwent CARLOS/cardioversion on September 6 by Dr. Llamas.  After 1 biphasic shock of 50 J he converted to normal sinus rhythm.  He was discharge  and returned on same day with shortness of breath accompanied by epigastric fullness/nausea.    He received diuretic treatment.  He is maintaining Spo2 > 90 on RA.  He is ambulating w/o previous complaints of SOB.  He is not requiring oxygen.  He is doing a lot better.     Cardiology saw him in consult.  Per Dr. Sue normal BNP effectively rules out acute cardiac pulmonary edema.  He felt mild LFT elevation suggestive of possible drug side effect and agree with  holding statin and amiodarone.  Transaminases improving.    Overall, he is medically stable and appropriate for discharge from my standpoint if ok with Cardiology service.    Procedures Performed:   None    Consults:   Dr. Sue    Pertinent Test Results:   Lab Results (last 48 hours)     Procedure Component Value Units Date/Time    POC Glucose Once [[id number]]  (Abnormal) Collected:  09/08/19 1108    Specimen:  Blood Updated:  09/08/19 1119     Glucose 142 mg/dL      Comment: : [id number] avox WhitneyMeter ID: [id number]       POC Glucose Once [[id number]]  (Normal) Collected:  09/08/19 0726    Specimen:  Blood Updated:  09/08/19 0737     Glucose 102 mg/dL      Comment: : [id number] avox WhitneyMeter ID: [id number]       Comprehensive Metabolic Panel [[id number]]  (Abnormal) Collected:  09/08/19 0527    Specimen:  Blood Updated:  09/08/19 0649     Glucose 98 mg/dL      BUN 21 mg/dL      Creatinine 1.15 mg/dL      Sodium 139 mmol/L      Potassium 4.8 mmol/L      Chloride 106 mmol/L      CO2 25.0 mmol/L      Calcium 9.4 mg/dL      Total Protein 6.6 g/dL      Albumin 3.70 g/dL      ALT (SGPT) 89 U/L      AST (SGOT) 64 U/L      Alkaline Phosphatase 79 U/L      Total Bilirubin 0.9 mg/dL      eGFR Non African Amer 65 mL/min/1.73      Globulin 2.9 gm/dL      A/G Ratio 1.3 g/dL      BUN/Creatinine Ratio 18.3     Anion Gap 8.0 mmol/L     Narrative:       GFR Normal >60  Chronic Kidney Disease <60  Kidney Failure <15    POC Glucose Once [[id number]]  (Abnormal) Collected:  09/07/19 2056    Specimen:  Blood Updated:  09/07/19 2107     Glucose 248 mg/dL      Comment: : [id number] Jose Luis ValentinoMeter ID: [id number]       POC Glucose Once [[id number]]  (Abnormal) Collected:  09/07/19 1630    Specimen:  Blood Updated:  09/07/19 1641     Glucose 185 mg/dL      Comment: : [id number] Reynaldo Kc ID: [id number]       POC Glucose Once [[id number]]  (Abnormal) Collected:  09/07/19 1127    Specimen:  Blood Updated:   09/07/19 1138     Glucose 187 mg/dL      Comment: : [id number] Reynaldo cK ID: [id number]       Hemoglobin A1c [[id number]]  (Abnormal) Collected:  09/07/19 0642    Specimen:  Blood Updated:  09/07/19 0858     Hemoglobin A1C 6.5 %     Narrative:       Less than 6.0           Non-Diabetic Range  6.0-7.0                 ADA Therapeutic Target  Greater than 7.0        Action Suggested    TSH [[id number]]  (Normal) Collected:  09/07/19 0642    Specimen:  Blood Updated:  09/07/19 0757     TSH 2.020 uIU/mL     Lipid Panel [[id number]]  (Abnormal) Collected:  09/07/19 0642    Specimen:  Blood Updated:  09/07/19 0738     Total Cholesterol 120 mg/dL      Triglycerides 74 mg/dL      HDL Cholesterol 36 mg/dL      LDL Cholesterol  74 mg/dL      LDL/HDL Ratio 1.92    Comprehensive Metabolic Panel [[id number]]  (Abnormal) Collected:  09/07/19 0642    Specimen:  Blood Updated:  09/07/19 0727     Glucose 89 mg/dL      BUN 24 mg/dL      Creatinine 1.19 mg/dL      Sodium 142 mmol/L      Potassium 4.4 mmol/L      Chloride 106 mmol/L      CO2 24.0 mmol/L      Calcium 10.1 mg/dL      Total Protein 8.0 g/dL      Albumin 4.70 g/dL      ALT (SGPT) 109 U/L      AST (SGOT) 100 U/L      Alkaline Phosphatase 104 U/L      Total Bilirubin 1.3 mg/dL      eGFR Non African Amer 62 mL/min/1.73      Globulin 3.3 gm/dL      A/G Ratio 1.4 g/dL      BUN/Creatinine Ratio 20.2     Anion Gap 12.0 mmol/L     Narrative:       GFR Normal >60  Chronic Kidney Disease <60  Kidney Failure <15    CBC Auto Differential [[id number]]  (Abnormal) Collected:  09/07/19 0642    Specimen:  Blood Updated:  09/07/19 0723     WBC 10.30 10*3/mm3      RBC 4.90 10*6/mm3      Hemoglobin 13.1 g/dL      Hematocrit 41.4 %      MCV 84.5 fL      MCH 26.7 pg      MCHC 31.6 g/dL      RDW 13.8 %      RDW-SD 42.8 fl      MPV 12.0 fL      Platelets 192 10*3/mm3      Neutrophil % 76.4 %      Lymphocyte % 15.4 %      Monocyte % 7.1 %      Eosinophil % 0.4 %      Basophil % 0.4 %       Immature Grans % 0.3 %      Neutrophils, Absolute 7.87 10*3/mm3      Lymphocytes, Absolute 1.59 10*3/mm3      Monocytes, Absolute 0.73 10*3/mm3      Eosinophils, Absolute 0.04 10*3/mm3      Basophils, Absolute 0.04 10*3/mm3      Immature Grans, Absolute 0.03 10*3/mm3      nRBC 0.0 /100 WBC     Troponin [[id number]]  (Normal) Collected:  09/07/19 0248    Specimen:  Blood Updated:  09/07/19 0322     Troponin I 0.020 ng/mL     [place] Draw [[id number]] Collected:  09/07/19 0010    Specimen:  Blood Updated:  09/07/19 0141    Narrative:       The following orders were created for panel order [place] Draw.  Procedure                               Abnormality         Status                     ---------                               -----------         ------                     Light Blue Top[[id number]]                                                              Green Top (Gel)[[id number]]                                  Final result               Lavender Top[[id number]]                                     Final result               Red Top[[id number]]                                                                       Please view results for these tests on the individual orders.    Green Top (Gel) [[id number]] Collected:  09/07/19 0010    Specimen:  Blood Updated:  09/07/19 0121     Extra Tube Hold for add-ons.     Comment: Auto resulted.       Lavender Top [[id number]] Collected:  09/07/19 0010    Specimen:  Blood Updated:  09/07/19 0121     Extra Tube hold for add-on     Comment: Auto resulted       Blood Gas, Arterial [[id number]]  (Abnormal) Collected:  09/07/19 0100    Specimen:  Arterial Blood Updated:  09/07/19 0108     Site Right Brachial     Thien's Test N/A     pH, Arterial 7.390 pH units      pCO2, Arterial 36.0 mm Hg      pO2, Arterial 68.0 mm Hg      Comment: 84 Value below reference range        HCO3, Arterial 21.8 mmol/L      Base Excess, Arterial -2.7 mmol/L      Comment: 84 Value below reference range         O2 Saturation, Arterial 90.3 %      Comment: 84 Value below reference range        Temperature 37.0 C      Barometric Pressure for Blood Gas 751 mmHg      Modality Nasal Cannula     Flow Rate 2.0 lpm      Ventilator Mode NA     Collected by [id number]     Comment: Meter: [id number]     :  [id number]       aPTT [[id number]]  (Normal) Collected:  09/06/19 2239    Specimen:  Blood Updated:  09/07/19 0042     PTT 27.1 seconds     Protime-INR [[id number]]  (Abnormal) Collected:  09/06/19 2239    Specimen:  Blood Updated:  09/07/19 0042     Protime 15.7 Seconds      INR 1.21    D-dimer, Quantitative [[id number]]  (Abnormal) Collected:  09/06/19 2239    Specimen:  Blood Updated:  09/07/19 0042     D-Dimer, Quantitative 2.01 mg/L (FEU)     Narrative:       Reference Range is 0-0.50 mg/L FEU. However, results <0.50 mg/L FEU tends to rule out DVT or PE. Results >0.50 mg/L FEU are not useful in predicting absence or presence of DVT or PE.    BNP [[id number]]  (Normal) Collected:  09/07/19 0010    Specimen:  Blood Updated:  09/07/19 0040     proBNP 732.0 pg/mL     Troponin [[id number]]  (Normal) Collected:  09/07/19 0010    Specimen:  Blood Updated:  09/07/19 0038     Troponin I 0.019 ng/mL     Comprehensive Metabolic Panel [[id number]]  (Abnormal) Collected:  09/07/19 0010    Specimen:  Blood Updated:  09/07/19 0036     Glucose 95 mg/dL      BUN 24 mg/dL      Creatinine 1.24 mg/dL      Sodium 140 mmol/L      Potassium 5.1 mmol/L      Chloride 109 mmol/L      CO2 22.0 mmol/L      Calcium 9.7 mg/dL      Total Protein 7.4 g/dL      Albumin 4.30 g/dL      ALT (SGPT) 87 U/L      AST (SGOT) 87 U/L      Alkaline Phosphatase 87 U/L      Total Bilirubin 0.9 mg/dL      eGFR Non African Amer 59 mL/min/1.73      Globulin 3.1 gm/dL      A/G Ratio 1.4 g/dL      BUN/Creatinine Ratio 19.4     Anion Gap 9.0 mmol/L     Narrative:       GFR Normal >60  Chronic Kidney Disease <60  Kidney Failure <15    [place] Draw [[id number]] Collected:   09/06/19 2239    Specimen:  Blood Updated:  09/06/19 2345    Narrative:       The following orders were created for panel order [place] Draw.  Procedure                               Abnormality         Status                     ---------                               -----------         ------                     Light Blue Top[[id number]]                                   Final result               Green Top (Gel)[[id number]]                                  Final result               Lavender Top[[id number]]                                     Final result               Red Top[[id number]]                                          Final result                 Please view results for these tests on the individual orders.    Light Blue Top [[id number]] Collected:  09/06/19 2239    Specimen:  Blood Updated:  09/06/19 2345     Extra Tube hold for add-on     Comment: Auto resulted       Green Top (Gel) [[id number]] Collected:  09/06/19 2239    Specimen:  Blood Updated:  09/06/19 2345     Extra Tube Hold for add-ons.     Comment: Auto resulted.       Lavender Top [[id number]] Collected:  09/06/19 2239    Specimen:  Blood Updated:  09/06/19 2345     Extra Tube hold for add-on     Comment: Auto resulted       Red Top [[id number]] Collected:  09/06/19 2239    Specimen:  Blood Updated:  09/06/19 2345     Extra Tube Hold for add-ons.     Comment: Auto resulted.       Troponin [[id number]]  (Normal) Collected:  09/06/19 2239    Specimen:  Blood Updated:  09/06/19 2337     Troponin I 0.020 ng/mL     CBC & Differential [[id number]] Collected:  09/06/19 2239    Specimen:  Blood Updated:  09/06/19 2320    Narrative:       The following orders were created for panel order CBC & Differential.  Procedure                               Abnormality         Status                     ---------                               -----------         ------                     CBC Auto Differential[[id number]]        Abnormal            Final result                  Please view results for these tests on the individual orders.    CBC Auto Differential [[id number]]  (Abnormal) Collected:  09/06/19 2239    Specimen:  Blood Updated:  09/06/19 2320     WBC 10.82 10*3/mm3      RBC 4.92 10*6/mm3      Hemoglobin 13.2 g/dL      Hematocrit 42.4 %      MCV 86.2 fL      MCH 26.8 pg      MCHC 31.1 g/dL      RDW 13.8 %      RDW-SD 43.4 fl      MPV 11.9 fL      Platelets 209 10*3/mm3      Neutrophil % 69.6 %      Lymphocyte % 21.7 %      Monocyte % 6.3 %      Eosinophil % 1.7 %      Basophil % 0.4 %      Immature Grans % 0.3 %      Neutrophils, Absolute 7.54 10*3/mm3      Lymphocytes, Absolute 2.35 10*3/mm3      Monocytes, Absolute 0.68 10*3/mm3      Eosinophils, Absolute 0.18 10*3/mm3      Basophils, Absolute 0.04 10*3/mm3      Immature Grans, Absolute 0.03 10*3/mm3      nRBC 0.0 /100 WBC         Imaging Results (last 72 hours)     Procedure Component Value Units Date/Time    CT Angiogram Chest With Contrast [[id number]] Collected:  09/07/19 0900     Updated:  09/07/19 0913    Narrative:       EXAM: CT ANGIOGRAM CHEST W CONTRAST- - 9/7/2019 2:27 AM CDT     HISTORY: elevated dimer, sob       COMPARISON: 08/30/2019.      DOSE LENGTH PRODUCT: 477 mGy cm. Automatic exposure control was utilized  to make radiation dose as low as reasonably achievable.     TECHNIQUE: Enhanced axial CT images obtained with multiplanar reformats.  3D postprocessing, including MIPs, performed and images saved to PACS.     FINDINGS:   AIRWAYS/PULMONARY PARENCHYMA: Main airways patent. Diffuse thickening of  the small airways with patchy groundglass opacities, greatest at the  lung bases. Some of the groundglass opacities appear nodular. For  instance, a 7 mm groundglass pulmonary nodule in the right upper lobe on  axial series 5, image 49. No dense focal consolidation. Small to  moderate bilateral pleural effusions.     VESSELS: Contrast bolus is adequate. No pulmonary artery filling defect  to suggest  "pulmonary embolus. Aorta normal in course and caliber.       CARDIAC:  Borderline heart size. No pericardial effusion.  Coronary  artery densities, suspect calcifications with stent.      MEDIASTINUM: Small mediastinal lymph nodes. No mediastinal adenopathy.  Esophagus has normal coarse, caliber and wall thickness.     EXTRATHORACIC: The visualized portions of the thyroid gland are  unremarkable. No thoracic inlet or axillary adenopathy. The  extrathoracic soft tissues are within normal limits.      INCLUDED UPPER ABDOMEN: The visualized portions of the upper abdomen are  within normal limits.     OSSEOUS: Degenerative changes in the visualized spine. Hemangioma in the  lower thoracic vertebral body. Sternotomy wires.           Impression:       1. Patchy bilateral groundglass opacities with diffuse thickening of the  small airways, most likely edema or infection. Moderate bilateral  pleural effusions. Some of the opacities are nodular; consider follow-up  chest CT in 3-6 months per Fleischner criteria.  2. Borderline heart size. Coronary artery densities likely calcified  atherosclerosis with stent.     Preliminary interpretation performed by [facility] and I agree with the  preliminary report.   Exam was tagged in PACS with \"new lung findings\" to assist in  appropriate follow up.   This report was finalized on 09/07/2019 09:10 by Dr Mari Melton MD.    XR Chest 1 View [[id number]] Collected:  09/07/19 0855     Updated:  09/07/19 0903    Narrative:       EXAM: XR CHEST 1 VW- - 9/6/2019 11:17 PM CDT     HISTORY: Chest Pain Triage Protocol       COMPARISON: 09/05/2019.      TECHNIQUE:  1 images.  Frontal view of the chest.     FINDINGS:    No pneumothorax, pleural effusion or focal consolidation. Prominent  cardiac silhouette. Upper mediastinum within normal limits. The  previously demonstrated sternotomy wires are not evident on this exam,  but the appearance of the chest is otherwise similar compared to priors  and " "nonvisualization of the sternotomy wires is likely to be due to  technique.          Impression:       1. No acute cardiopulmonary finding. Similar prominent cardiac  silhouette.  This report was finalized on 09/07/2019 09:00 by Dr Mari Melton MD.            Condition on Discharge:  Stable    Physical Exam on Discharge:  /72 (BP Location: Left arm, Patient Position: Sitting)   Pulse 66   Temp 98 °F (36.7 °C) (Oral)   Resp 18   Ht 182.9 cm (72\")   Wt 106 kg (234 lb 6.4 oz)   SpO2 96%   BMI 31.79 kg/m²    Physical Exam   Constitutional: He is oriented to person, place, and time. He appears well-developed.   HENT:   Head: Normocephalic and atraumatic.   Right Ear: External ear normal.   Left Ear: External ear normal.   Mouth/Throat: Oropharynx is clear and moist.   Eyes: Conjunctivae are normal. Pupils are equal, round, and reactive to light.   Neck: Normal range of motion. Neck supple. No tracheal deviation present. No thyromegaly present.   Cardiovascular: Normal rate, regular rhythm, normal heart sounds and intact distal pulses.   Pulmonary/Chest: Effort normal and breath sounds normal.   Abdominal: Soft. Bowel sounds are normal. He exhibits no distension. There is no tenderness.   Musculoskeletal: Normal range of motion. He exhibits no tenderness.   Neurological: He is alert and oriented to person, place, and time. No cranial nerve deficit. He exhibits normal muscle tone. Coordination normal.   Skin: Skin is dry. Capillary refill takes less than 2 seconds. He is not diaphoretic. No erythema. No pallor.   Dry scaly skin with thickening on his bilateral elbows   Psychiatric: He has a normal mood and affect. His behavior is normal. Judgment and thought content normal.    Healing well left leg from grafting from prior coronary artery bypass graft      Discharge Disposition:      Discharge Medications:     Discharge Medications      New Medications      Instructions Start Date   furosemide 40 MG " tablet  Commonly known as:  LASIX   40 mg, Oral, Daily PRN         Changes to Medications      Instructions Start Date   levETIRAcetam 500 MG tablet  Commonly known as:  KEPPRA  What changed:    · how much to take  · when to take this  · additional instructions   Take 3 tablets every morning, take 4 tablets every night.         Continue These Medications      Instructions Start Date   albuterol sulfate  (90 Base) MCG/ACT inhaler  Commonly known as:  PROVENTIL HFA;VENTOLIN HFA;PROAIR HFA   2 puffs, Inhalation, Every 6 Hours PRN      apixaban 5 MG tablet tablet  Commonly known as:  ELIQUIS   5 mg, Oral, Every 12 Hours Scheduled      aspirin 81 MG chewable tablet   81 mg, Oral, Daily      calcium polycarbophil 625 MG tablet  Commonly known as:  FIBERCON   625 mg, Oral, Daily      carboxymethylcellulose 0.5 % solution  Commonly known as:  REFRESH PLUS   1 drop, Both Eyes, 4 Times Daily PRN      fluticasone 50 MCG/ACT nasal spray  Commonly known as:  FLONASE   2 sprays, Nasal, Daily      gabapentin 100 MG capsule  Commonly known as:  NEURONTIN   100 mg, Oral, Nightly      insulin aspart 100 UNIT/ML solution pen-injector sc pen  Commonly known as:  novoLOG FLEXPEN   30 Units, Subcutaneous, Every Morning, 34 units  at breakfast, 30 units at lunch, 34 units hs      Insulin Glargine 100 UNIT/ML injection pen  Commonly known as:  LANTUS SOLOSTAR   60-90 Units, Subcutaneous, 2 Times Daily, 65 units QAM AND 70 units QPM      JARDIANCE 10 MG tablet  Generic drug:  Empagliflozin   1 tablet, Oral, Daily      lamoTRIgine 200 MG tablet  Commonly known as:  LaMICtal   200 mg, Oral, 2 Times Daily      metFORMIN 1000 MG tablet  Commonly known as:  GLUCOPHAGE   1,000 mg, Oral, 2 Times Daily With Meals, HOLD x 48 hours post contrast. May resume 3/18/18      metoprolol tartrate 50 MG tablet  Commonly known as:  LOPRESSOR   25 mg, Oral, 2 Times Daily      MULTI VITAMIN PO   1 tablet, Oral, Daily      mupirocin 2 % ointment  Commonly  known as:  BACTROBAN   1 application, Topical, 3 Times Daily      nitroglycerin 0.4 MG SL tablet  Commonly known as:  NITROSTAT   0.4 mg, Sublingual, Every 5 Minutes PRN, Take no more than 3 doses in 15 minutes.      ondansetron ODT 4 MG disintegrating tablet  Commonly known as:  ZOFRAN-ODT   4 mg, Oral, Every 4 Hours PRN      pantoprazole 40 MG EC tablet  Commonly known as:  PROTONIX   40 mg, Oral, 2 Times Daily      psyllium 58.6 % packet  Commonly known as:  METAMUCIL   1 packet, Oral, Daily PRN      urea 40 % ointment  Commonly known as:  CARMOL   Topical, As Needed      VICTOZA SC   1.2 mg, Subcutaneous, Nightly, 1.2         Stop These Medications    amiodarone 200 MG tablet  Commonly known as:  PACERONE     rosuvastatin 40 MG tablet  Commonly known as:  CRESTOR            Discharge Diet:   Diet Instructions     Diet: Regular, Consistent Carbohydrate, Cardiac; Thin      Discharge Diet:   Regular  Consistent Carbohydrate  Cardiac       Fluid Consistency:  Thin          Discharge Care Plan / Instructions: daily weight   Activity at Discharge:   Activity Instructions     Activity as Tolerated            Follow-up Appointments: keep f/u with Dr. Ramey this week, PCP within this week  Test Results Pending at Discharge:      Gregor Bell MD  09/08/19  2:10 PM    Time: > 30 mins      Part of this note may be an electronic transcription/translation of spoken language to printed text using the Dragon Dictation System.            Electronically signed by Gregor Bell MD at 9/8/2019  2:11 PM

## 2019-09-09 NOTE — OUTREACH NOTE
Medical Week 1 Survey      Responses   Facility patient discharged from?  [facility]   Does the patient have one of the following disease processes/diagnoses(primary or secondary)?  Other   Is there a successful TCM telephone encounter documented?  No   Week 1 attempt successful?  No   Rescheduled  Revoked   Revoke  Readmitted          Germaine Quintana RN

## 2019-09-09 NOTE — OUTREACH NOTE
Medical Week 1 Survey      Responses   Facility patient discharged from?  [facility]   Does the patient have one of the following disease processes/diagnoses(primary or secondary)?  Other   Is there a successful TCM telephone encounter documented?  No   Week 1 attempt successful?  No   Rescheduled  Revoked   Revoke  Readmitted          Germaine Quintana RN

## 2019-09-09 NOTE — OUTREACH NOTE
Prep Survey      Responses   Facility patient discharged from?  [facility]   Is patient eligible?  Yes   Discharge diagnosis  CHF   Does the patient have one of the following disease processes/diagnoses(primary or secondary)?  CHF   Does the patient have Home health ordered?  No   Is there a DME ordered?  No   Comments regarding appointments  See AVS   Medication alerts for this patient  lasix   Prep survey completed?  Yes          Germaine Quintana RN

## 2019-09-10 ENCOUNTER — READMISSION MANAGEMENT (OUTPATIENT)
Dept: CALL CENTER | Facility: HOSPITAL | Age: 59
End: 2019-09-10

## 2019-09-10 NOTE — OUTREACH NOTE
CHF Week 1 Survey      Responses   Facility patient discharged from?  [facility]   Does the patient have one of the following disease processes/diagnoses(primary or secondary)?  CHF   Is there a successful TCM telephone encounter documented?  No   CHF Week 1 attempt successful?  Yes   Call start time  0827   Call end time  0835   Is patient permission given to speak with other caregiver?  Yes   List who call center can speak with  Power Martino reviewed with patient/caregiver?  Yes   Is the patient having any side effects they believe may be caused by any medication additions or changes?  No   Does the patient have all medications ordered at discharge?  Yes   Is the patient taking all medications as directed (includes completed medication regime)?  Yes   Comments regarding appointments  PCP appt 09/11/19   Does the patient have a primary care provider?   Yes   Does the patient have an appointment with their PCP within 7 days of discharge?  Yes   Has the patient kept scheduled appointments due by today?  N/A   Has home health visited the patient within 72 hours of discharge?  N/A   Psychosocial issues?  No   Did the patient receive a copy of their discharge instructions?  Yes   Nursing interventions  Reviewed instructions with patient, Educated on MyChart   What is the patient's perception of their health status since discharge?  Improving   Nursing interventions  Nurse provided patient education   Is the patient weighing daily?  Yes   Does the patient have scales?  Yes   Daily weight interventions  Education provided on importance of daily weight   Is the patient able to teach back Heart Failure diet management?  Yes   Is the patient able to teach back Heart Failure Zones?  Yes   Is the patient able to teach back signs and symptoms of worsening condition? (i.e. weight gain, shortness of air, etc.)  Yes   If the patient is a current smoker, are they able to teach back resources for cessation?  -- [Stopped smoking 20  years ago.]   Is the patient/caregiver able to teach back the hierarchy of who to call/visit for symptoms/problems? PCP, Specialist, Home health nurse, Urgent Care, ED, 911  Yes   Additional teach back comments  Reviewed cardiac s/s.    CHF Week 1 call completed?  Yes   Wrap up additional comments  States is feeling better-continues with cardiac rehab. States no edema-weighs twice daily. States is aware of weight parameters. Denies any cardiac s/s.Denies any problems today.          Yesica Claros RN

## 2019-09-11 ENCOUNTER — TREATMENT (OUTPATIENT)
Dept: CARDIAC REHAB | Facility: HOSPITAL | Age: 59
End: 2019-09-11

## 2019-09-11 DIAGNOSIS — Z95.1 S/P CABG (CORONARY ARTERY BYPASS GRAFT): Primary | ICD-10-CM

## 2019-09-11 PROCEDURE — 93798 PHYS/QHP OP CAR RHAB W/ECG: CPT

## 2019-09-13 ENCOUNTER — TREATMENT (OUTPATIENT)
Dept: CARDIAC REHAB | Facility: HOSPITAL | Age: 59
End: 2019-09-13

## 2019-09-13 ENCOUNTER — OFFICE VISIT (OUTPATIENT)
Dept: CARDIOLOGY | Facility: CLINIC | Age: 59
End: 2019-09-13

## 2019-09-13 VITALS
WEIGHT: 242 LBS | DIASTOLIC BLOOD PRESSURE: 80 MMHG | SYSTOLIC BLOOD PRESSURE: 130 MMHG | BODY MASS INDEX: 32.78 KG/M2 | HEART RATE: 94 BPM | OXYGEN SATURATION: 98 % | HEIGHT: 72 IN

## 2019-09-13 DIAGNOSIS — I65.22 STENOSIS OF LEFT CAROTID ARTERY: ICD-10-CM

## 2019-09-13 DIAGNOSIS — Z99.89 OSA ON CPAP: ICD-10-CM

## 2019-09-13 DIAGNOSIS — Z95.1 S/P CABG (CORONARY ARTERY BYPASS GRAFT): Primary | ICD-10-CM

## 2019-09-13 DIAGNOSIS — G47.33 OSA ON CPAP: ICD-10-CM

## 2019-09-13 DIAGNOSIS — E11.42 DIABETIC PERIPHERAL NEUROPATHY (HCC): ICD-10-CM

## 2019-09-13 DIAGNOSIS — G40.909 SEIZURE DISORDER (HCC): ICD-10-CM

## 2019-09-13 DIAGNOSIS — I77.9 BILATERAL CAROTID ARTERY DISEASE, UNSPECIFIED TYPE (HCC): ICD-10-CM

## 2019-09-13 DIAGNOSIS — I69.30 CHRONIC LEFT ARTERIAL ISCHEMIC STROKE, ICA (INTERNAL CAROTID ARTERY): ICD-10-CM

## 2019-09-13 DIAGNOSIS — I10 ESSENTIAL HYPERTENSION: ICD-10-CM

## 2019-09-13 DIAGNOSIS — I25.119 CORONARY ARTERY DISEASE INVOLVING NATIVE CORONARY ARTERY OF NATIVE HEART WITH ANGINA PECTORIS (HCC): ICD-10-CM

## 2019-09-13 DIAGNOSIS — Z86.39 HISTORY OF HYPERLIPIDEMIA: Primary | ICD-10-CM

## 2019-09-13 DIAGNOSIS — I48.0 PAROXYSMAL ATRIAL FIBRILLATION (HCC): ICD-10-CM

## 2019-09-13 DIAGNOSIS — R91.8 LUNG NODULES: ICD-10-CM

## 2019-09-13 DIAGNOSIS — I71.20 THORACIC AORTIC ANEURYSM WITHOUT RUPTURE (HCC): ICD-10-CM

## 2019-09-13 PROCEDURE — 99214 OFFICE O/P EST MOD 30 MIN: CPT | Performed by: INTERNAL MEDICINE

## 2019-09-13 PROCEDURE — 93000 ELECTROCARDIOGRAM COMPLETE: CPT | Performed by: INTERNAL MEDICINE

## 2019-09-13 PROCEDURE — 93798 PHYS/QHP OP CAR RHAB W/ECG: CPT

## 2019-09-13 RX ORDER — FUROSEMIDE 40 MG/1
TABLET ORAL
Refills: 0 | COMMUNITY
Start: 2019-09-09 | End: 2022-01-11 | Stop reason: SDUPTHER

## 2019-09-13 NOTE — PROGRESS NOTES
Carlos A Boyer Jr.  [id number]  1960  59 y.o.  male    Referring Provider: Vinayak Correia PA    Reason for Follow-up Visit: Here for routine follow up   Recent ER visit for Paroxysmal atrial fibrillation with with rapid ventricular response    CARLOS guided cardioversion, on anticoagulation with Eliquis  coronary artery disease stented coronary artery   Type 2 diabetes mellitus   S/p CABG       Subjective    Overall feeling well   No chest pain or shortness of breath     No palpitations  No significant pedal edema    Compliant with medications and dietary advice  Good effort tolerance    No presyncope or syncope  Compliant with medications      Excellent effort tolerance with no cardiovascular limitations and at the patient's baseline   Overall feels dramatically better     Excellent effort tolerance with no cardiovascular limitations and at the patient's baseline     In cardiac rehab now  BP elevated at home   Blood sugars well controlled at home    History of present illness:  Carlos A Boyer Jr. is a 59 y.o. yo male with history of chest pain who presents today for   Chief Complaint   Patient presents with   • Coronary Artery Disease     2 WK S/P CARDIOVERSION   .    History  Past Medical History:   Diagnosis Date   • Arthritis    • Asthma    • Carotid disease, bilateral (CMS/HCC)    • Chest pain    • Chronic ischemic heart disease    • Chronic sinusitis    • Maribell bullosa    • Coronary artery disease    • Deviated septum    • Diabetes mellitus (CMS/HCC)    • Difficulty urinating    • Diverticulitis    • Enlarged prostate    • Fatty liver    • GERD (gastroesophageal reflux disease)    • Hyperlipidemia    • Hypertension    • Hypertrophy of nasal turbinates    • Keratoderma    • Kidney stone    • Migraine    • Murmur, heart    • Myocardial infarction (CMS/HCC)    • Obesity    • PONV (postoperative nausea and vomiting)    • Psoriasis    • Seizures (CMS/HCC)    • Sinus congestion    • Sleep apnea    • SOB  (shortness of breath)    • Stroke (CMS/HCC)    • UTI (urinary tract infection)    ,   Past Surgical History:   Procedure Laterality Date   • CARDIAC CATHETERIZATION  01/2016    Dr. Broadbent; widely patent previously placed stents in the left anterior descending and obstructive disease involving the diagonal branch which was treated medically   • CARDIAC CATHETERIZATION N/A 7/14/2017    Procedure: Left Heart Cath;  Surgeon: Wade Ramey MD;  Location:  [facility];  Service:    • CARDIAC CATHETERIZATION Left 10/15/2018    Procedure: Cardiac Catheterization/Vascular Study;  Surgeon: Wade Ramey MD;  Location:  [facility];  Service: Cardiology   • CARDIAC CATHETERIZATION  10/15/2018    Procedure: Functional Flow [place];  Surgeon: Wade Ramey MD;  Location:  [facility];  Service: Cardiology   • CARDIAC CATHETERIZATION N/A 10/15/2018    Procedure: Left ventriculography;  Surgeon: Wade Ramey MD;  Location:  [facility];  Service: Cardiology   • CARDIAC CATHETERIZATION Left 6/26/2019    Procedure: Cardiac Catheterization/Vascular Study VEL OK  HE WILL WAIT 1 YEAR FOR SHOULDER SURGERY ;  Surgeon: Wade Ramey MD;  Location:  [facility];  Service: Cardiology   • CHOLECYSTECTOMY     • CHOLECYSTECTOMY WITH INTRAOPERATIVE CHOLANGIOGRAM N/A 8/1/2018    Procedure: CHOLECYSTECTOMY LAPAROSCOPIC INTRAOPERATIVE CHOLANGIOGRAM;  Surgeon: Shane Ann MD;  Location: [facility] OR;  Service: General   • CORONARY ANGIOPLASTY     • CORONARY ARTERY BYPASS GRAFT N/A 7/6/2019    Procedure: CABG X2 WITH LIMA, LEFT LEG OVH, AND PLACEMENT OF LEFT FEMORAL ARTERIAL LINE;  Surgeon: Steven Tang MD;  Location: [facility] OR;  Service: Cardiothoracic   • CORONARY STENT PLACEMENT      x 6   • ENDOSCOPIC FUNCTIONAL SINUS SURGERY (FESS) Bilateral 12/13/2017    Procedure: PROCEDURE PERFORMED:  Bilateral functional endoscopic anterior ethmoidectomy with  bilateral middle meatal antrostomy Septoplasty Right kathia bullosa resection Bilateral inferior turbinate reduction via Coblation;  Surgeon: Mayank Ibarra MD;  Location: [facility] OR;  Service:    • ENDOSCOPY N/A 2018    Procedure: ESOPHAGOGASTRODUODENOSCOPY WITH ANESTHESIA;  Surgeon: Benitez Mas MD;  Location: [facility] ENDOSCOPY;  Service: Gastroenterology   • HERNIA REPAIR      x2 inguinal area   • KIDNEY STONE SURGERY     • KNEE SURGERY Right    • OTHER SURGICAL HISTORY      urolift   • PROSTATE SURGERY      Dr. Badillo -    • THUMB AMPUTATION Left     partial   • TOE AMPUTATION Right     big   ,   Family History   Problem Relation Age of Onset   • Heart disease Father    • COPD Mother    • Hypertension Mother    • Asthma Mother    • No Known Problems Sister    • Prostate cancer Maternal Grandfather    • No Known Problems Sister    ,   Social History     Tobacco Use   • Smoking status: Former Smoker     Packs/day: 1.50     Years: 4.50     Pack years: 6.75     Types: Cigarettes, Cigars     Last attempt to quit:      Years since quittin.7   • Smokeless tobacco: Former User     Types: Chew     Quit date:    Substance Use Topics   • Alcohol use: No   • Drug use: No   ,     Medications  Current Outpatient Medications   Medication Sig Dispense Refill   • albuterol (PROVENTIL HFA;VENTOLIN HFA) 108 (90 BASE) MCG/ACT inhaler Inhale 2 puffs Every 6 (Six) Hours As Needed for wheezing.     • apixaban (ELIQUIS) 5 MG tablet tablet Take 1 tablet by mouth Every 12 (Twelve) Hours. 60 tablet 5   • aspirin 81 MG chewable tablet Chew 81 mg Daily.     • calcium polycarbophil (FIBERCON) 625 MG tablet Take 625 mg by mouth Daily.     • carboxymethylcellulose (REFRESH PLUS) 0.5 % solution Administer 1 drop to both eyes 4 (Four) Times a Day As Needed for Dry Eyes.     • Empagliflozin (JARDIANCE) 10 MG tablet Take 1 tablet by mouth Daily.     • fluticasone (FLONASE) 50 MCG/ACT nasal spray 2 sprays into each  nostril Daily.     • furosemide (LASIX) 40 MG tablet TAKE 1 TABLET BY MOUTH ONCE DAILY AS NEEDED FOR WEIGHT GAIN GREATER THAN 2 LBS IN A DAY OR INCREASING CHEST CONGESTION  0   • gabapentin (NEURONTIN) 100 MG capsule Take 1 capsule by mouth Every Night. 30 capsule 2   • insulin aspart (novoLOG FLEXPEN) 100 UNIT/ML solution pen-injector sc pen Inject 30 Units under the skin into the appropriate area as directed Every Morning. 34 units  at breakfast, 30 units at lunch, 34 units hs     • Insulin Glargine (LANTUS SOLOSTAR) 100 UNIT/ML injection pen Inject 60-90 Units under the skin into the appropriate area as directed 2 (Two) Times a Day. 65 units QAM AND 70 units QPM     • lamoTRIgine (LaMICtal) 200 MG tablet Take 1 tablet by mouth 2 (Two) Times a Day. 180 tablet 1   • levETIRAcetam (KEPPRA) 500 MG tablet Take 3 tablets every morning, take 4 tablets every night. (Patient taking differently: 500 mg Take As Directed. Take 3 tablets every morning, take 4 tablets every night.) 630 tablet 1   • Liraglutide (VICTOZA SC) Inject 1.2 mg under the skin into the appropriate area as directed Every Night. 1.2     • metFORMIN (GLUCOPHAGE) 1000 MG tablet Take 1 tablet by mouth 2 (Two) Times a Day With Meals. HOLD x 48 hours post contrast. May resume 3/18/18     • metoprolol tartrate (LOPRESSOR) 50 MG tablet Take 25 mg by mouth 2 (Two) Times a Day.     • Multiple Vitamin (MULTI VITAMIN PO) Take 1 tablet by mouth Daily.     • mupirocin (BACTROBAN) 2 % ointment Apply 1 application topically to the appropriate area as directed 3 (Three) Times a Day.     • nitroglycerin (NITROSTAT) 0.4 MG SL tablet Place 0.4 mg under the tongue Every 5 (Five) Minutes As Needed for chest pain. Take no more than 3 doses in 15 minutes.     • ondansetron ODT (ZOFRAN-ODT) 4 MG disintegrating tablet Take 1 tablet by mouth Every 4 (Four) Hours As Needed for Nausea or Vomiting. 10 tablet 0   • pantoprazole (PROTONIX) 40 MG EC tablet Take 40 mg by mouth 2 (Two)  "Times a Day.     • psyllium (METAMUCIL) 58.6 % packet Take 1 packet by mouth Daily As Needed (constipation).     • urea (CARMOL) 40 % ointment Apply  topically to the appropriate area as directed As Needed for Dry Skin.       No current facility-administered medications for this visit.        Allergies:  Flagyl [metronidazole]; Atorvastatin; and Ciprofloxacin    Review of Systems  Review of Systems   Constitution: Positive for weakness and malaise/fatigue.   HENT: Negative.    Eyes: Negative.    Cardiovascular: Positive for dyspnea on exertion. Negative for chest pain, claudication, cyanosis, irregular heartbeat, leg swelling, near-syncope, orthopnea, palpitations, paroxysmal nocturnal dyspnea and syncope.   Respiratory: Negative.    Endocrine: Negative.    Hematologic/Lymphatic: Negative.    Skin: Negative.    Musculoskeletal: Positive for arthritis and back pain.   Gastrointestinal: Negative for anorexia.   Genitourinary: Negative.    Neurological: Positive for dizziness and light-headedness.   Psychiatric/Behavioral: Negative.        Objective     Physical Exam:      Vitals:    09/13/19 1446   BP: 130/80   Pulse: 94   SpO2: 98%   Weight: 110 kg (242 lb)   Height: 182.9 cm (72\")       /80   Pulse 94   Ht 182.9 cm (72\")   Wt 110 kg (242 lb)   SpO2 98%   BMI 32.82 kg/m²   Physical Exam   Constitutional: He appears well-developed.   HENT:   Head: Normocephalic.   Neck: Normal carotid pulses and no JVD present. No tracheal tenderness present. Carotid bruit is not present. No tracheal deviation and no edema present.   Cardiovascular: Regular rhythm, normal heart sounds and normal pulses.   Pulmonary/Chest: Effort normal. No stridor.   Abdominal: Soft. He exhibits no distension. There is no hepatosplenomegaly. There is no tenderness.   Neurological: He is alert. He has normal strength. No cranial nerve deficit or sensory deficit.   Skin: Skin is warm.   Psychiatric: He has a normal mood and affect. His speech " is normal and behavior is normal.     Wounds healing very well. No evidence of excessive bruising, pain, redness, swelling, discharge, foul odor or associated fever or chills.     Results Review:      Conclusion of cardiac catheterization     Left main coronary arteries normal  Left anterior descending coronary artery has patent stents  The distal edge of the stent in the mid left anterior descending coronary artery has approximately 60% stenosis  Highly abnormal fractional flow reserve of this at 0.78 without adenosine stress.  PTCA done of the segment.  Despite multiple attempts and use of guide liner could not advance the stent due to earlier implanted stents.  Left circumflex coronary artery is codominant and large  The proximal portion of the first obtuse marginal branch has a 60% stenosis with normal fractional flow reserve above 0.85.  Right coronary artery is large and codominant without any high-grade lesions.     Left ventricular end-diastolic pressure is mildly elevated to 15 mmHg.  No gradient across aortic valve on pullback.  Left ventriculography shows a hyperdynamic left ventricle with ejection fraction of 75%.     Percutaneous coronary intervention     XB 3.5 guide advised used for fractional flow reserve of the obtuse marginal branch as well as of the left anterior descending coronary artery  Then we did try to advance a 2.5 mm stent.  We used 2 different size stents and could not cross  We used a guide liner and does not did not have placed cross across the stent.  We then did balloon angioplasty using 2.0 mm balloon.  Stenosis was reduced from 60% down to less than 10%.  BEVERLEY-3 flow before and after procedure.           ____________________________________________________________________________________________________________________________________________     Plan after cardiac catheterization        Dual antiplatelet therapy minimum of 1 year preferably longer  Statin ACE inhibitors  beta-blockers  If there is repeat restenosis of the left anterior descending coronary artery would consider surgical revascularization.  Target LDL less than 70 mg/dL.  Maximize antianginal therapy.  Intensive risk factor modifications for both primary and secondary prevention if applicable  Hydration   Observation     Results for orders placed during the hospital encounter of 09/05/19   Adult Transesophageal Echo (CARLOS) W/ Cont if Necessary Per Protocol    Narrative · No evidence of left atrial appendage thrombus - okay to proceed with   cardioversion.  · Left ventricular systolic function is normal.  · Moderate mitral valve regurgitation is present  · Mild aortic insufficiency.  · Normal size and function of the right ventricle.  · Negative bubble study.      10/15/18        Conclusion     The left main coronary artery is normal  Left anterior descending coronary artery and diagonal branches are patent with a widely patent stent noted in the  left anterior descending coronary artery    Mid left anterior descending coronary artery is a 50% stenosis and hemodynamically not significant.  Left circumflex coronary artery is large with a first obtuse marginal around 40-60% stenosis.  Fractional flow reserve of this is completely normal and at above 0.9.  Left circumflex coronary artery is a codominant.  Right coronary artery is codominant large with distal small vessel disease.     Left ventricular end-diastolic pressure is normal at 11 mmHg no gradient across aortic valve on pullback  Left ventriculography shows ejection fraction of 55%.           Plan     Continue current medication  Symptomatic treatment for small vessel disease with antianginals  If stable can be discharged home later today  Intensive risk factor modifications for both primary and secondary prevention if applicable  Hydration   Observation         7/17 cath  Conclusion  Nonocclusive epicardial coronary arteries with widely patent stents in the  left  anterior descending coronary artery   artery and left circumflex coronary artery.  Hyperdynamic left ventricle with ejection fraction of 75%.  LVEDP   17    mm Hg     Plan  Workup for noncardiac causes of chest pain this can be done as outpatient.  His d-dimer is within normal range  After a period of observation of stable can be discharged either later today.  Keep follow-up appointment with me  Ongoing risk factor modifications keep LDL below 70 mg/dL  Hydration   Observation       ECG 12 Lead  Date/Time: 9/13/2019 3:05 PM  Performed by: Wade Ramey MD  Authorized by: Wade Ramey MD   Comparison: compared with previous ECG from 9/7/2019  Similar to previous ECG  Rhythm: sinus rhythm  Rate: normal  Conduction: 1st degree AV block  ST Segments: ST segments normal  QRS axis: normal  Other findings: non-specific ST-T wave changes    Clinical impression: abnormal EKG            Assessment/Plan   Patient Active Problem List   Diagnosis   • Type 2 diabetes mellitus without complication, with long-term current use of insulin (CMS/[facility])   • Gastroesophageal reflux disease without esophagitis   • Essential hypertension   • Seizure disorder (CMS/[facility])   • Carotid disease, bilateral (CMS/[facility])   • Coronary artery disease involving native coronary artery of native heart with angina pectoris (CMS/[facility])   • Mixed hyperlipidemia   • Chronic left arterial ischemic stroke, ICA (internal carotid artery)   • HOA on CPAP   • Benign non-nodular prostatic hyperplasia with lower urinary tract symptoms   • Deviated nasal septum   • Maribell bullosa   • Hypertrophy of both inferior nasal turbinates   • Chronic sinusitis of both maxillary sinuses   • S/P FESS (functional endoscopic sinus surgery)   • Diabetic complication (CMS/[facility])   • Epigastric pain   • Diabetic peripheral neuropathy (CMS/[facility])   • Abnormal stress test   • Stenosis of left carotid artery   • Class 1 obesity in adult   • 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CMS/[facility])   •  History of seizure   • Lung nodules   • Cardiac murmur   • Thoracic aortic aneurysm without rupture (CMS/HCC)   • Paroxysmal atrial fibrillation (CMS/HCC)   • Atrial flutter (CMS/HCC) with cardioversion on Sept 5, 2019   • Acute diastolic congestive heart failure (CMS/HCC)   • Fluid overload   • Pleural effusion   • History of hyperlipidemia   • Respiratory distress           Plan       The current medical regimen is effective;  continue present plan and medications.   Continue ASA 81 mg daily and ELiquis 5 mg BID    Continue Cardiac rehab next week    Keep A1c less than 7 Primary to monitor  Keep LDL below 70 mg/dl. Monitor liver and renal functions.   Monitor CBC, CMP, TSH (as indicated) and Lipid Panel by primary      S/L NTG PRN for chest pain, call me or go to ER if has to use S/L nitroglycerin     ____________________________________________________________________________________________________________________________________________  Health maintenance and recommendations      Similar recommendations as last visit     Offered to give patient  a copy      Questions were encouraged, asked and answered to the patient's  understanding and satisfaction. Questions if any regarding current medications and side effects, need for refills and importance of compliance to medications stressed.    Reviewed available prior notes, consults, prior visits, laboratory findings, radiology and cardiology relevant reports. Updated chart as applicable. I have reviewed the patient's medical history in detail and updated the computerized patient record as relevant.      Updated patient regarding any new or relevant abnormalities on review of records or any new findings on physical exam. Mentioned to patient about purpose of visit and desirable health short and long term goals and objectives.    Primary to monitor CBC CMP Lipid panel and TSH as  applicable    ___________________________________________________________________________________________________________________________________________              Return in about 3 months (around 12/13/2019).

## 2019-09-16 ENCOUNTER — TREATMENT (OUTPATIENT)
Dept: CARDIAC REHAB | Facility: HOSPITAL | Age: 59
End: 2019-09-16

## 2019-09-16 DIAGNOSIS — Z95.1 S/P CABG (CORONARY ARTERY BYPASS GRAFT): Primary | ICD-10-CM

## 2019-09-16 PROCEDURE — 93798 PHYS/QHP OP CAR RHAB W/ECG: CPT

## 2019-09-17 ENCOUNTER — READMISSION MANAGEMENT (OUTPATIENT)
Dept: CALL CENTER | Facility: HOSPITAL | Age: 59
End: 2019-09-17

## 2019-09-17 NOTE — OUTREACH NOTE
CHF Week 2 Survey      Responses   Facility patient discharged from?  [place]   Does the patient have one of the following disease processes/diagnoses(primary or secondary)?  CHF   Week 2 attempt successful?  No   Unsuccessful attempts  Attempt 1          Vida Pope RN

## 2019-09-18 ENCOUNTER — READMISSION MANAGEMENT (OUTPATIENT)
Dept: CALL CENTER | Facility: HOSPITAL | Age: 59
End: 2019-09-18

## 2019-09-18 ENCOUNTER — TREATMENT (OUTPATIENT)
Dept: CARDIAC REHAB | Facility: HOSPITAL | Age: 59
End: 2019-09-18

## 2019-09-18 DIAGNOSIS — Z95.1 S/P CABG (CORONARY ARTERY BYPASS GRAFT): Primary | ICD-10-CM

## 2019-09-18 PROCEDURE — 93798 PHYS/QHP OP CAR RHAB W/ECG: CPT

## 2019-09-18 NOTE — OUTREACH NOTE
CHF Week 2 Survey      Responses   Facility patient discharged from?  [facility]   Does the patient have one of the following disease processes/diagnoses(primary or secondary)?  CHF   Week 2 attempt successful?  No   Unsuccessful attempts  Attempt 2          Sulma Koehler RN

## 2019-09-20 ENCOUNTER — TREATMENT (OUTPATIENT)
Dept: CARDIAC REHAB | Facility: HOSPITAL | Age: 59
End: 2019-09-20

## 2019-09-20 DIAGNOSIS — Z95.1 S/P CABG (CORONARY ARTERY BYPASS GRAFT): Primary | ICD-10-CM

## 2019-09-20 PROCEDURE — 93798 PHYS/QHP OP CAR RHAB W/ECG: CPT

## 2019-09-23 ENCOUNTER — TREATMENT (OUTPATIENT)
Dept: CARDIAC REHAB | Facility: HOSPITAL | Age: 59
End: 2019-09-23

## 2019-09-23 DIAGNOSIS — Z95.1 S/P CABG (CORONARY ARTERY BYPASS GRAFT): Primary | ICD-10-CM

## 2019-09-23 PROCEDURE — 93798 PHYS/QHP OP CAR RHAB W/ECG: CPT

## 2019-09-23 NOTE — PROGRESS NOTES
"Subjective   Chief Complaint   Patient presents with   • Post-op Follow-up     CABG x2 on 7/6       Patient ID: Carlos A Boyer Jr. is a 59 y.o. male who is here for follow-up having had Urgent CABG- 2V (left internal mammary artery/left anterior descending and reverse saphenous vein graft/first obtuse marginal) with open left greater saphenous vein harvest performed on 7/6/2019    History of Present Illness  Post operative recovery was uneventful without any major complications. He has had some dizziness and lightheadedness that was found to be attributed to atrial fibrillation.  Has been on lopressor and amiodarone BID since then with no further episodes. Sleep habits are good. Pain control has been great. No fevers/sweats/chills. No drainage from incisions. No sternal clicks. No chest pain or shortness of breath. Appetite is great. Glycemic control is suitable.  Denies tobacco use. He is walking to current expectations.    He is doing well with cardiac rehab.     The following portions of the patient's history were reviewed and updated as appropriate: allergies, current medications, past family history, past medical history, past social history, past surgical history and problem list.        Objective   Visit Vitals  /82 (BP Location: Left arm, Patient Position: Sitting, Cuff Size: Adult)   Pulse 92   Ht 182.9 cm (72\")   Wt 111 kg (244 lb 3.2 oz)   SpO2 98%   BMI 33.12 kg/m²       Physical Exam   Constitutional: He is oriented to person, place, and time. He appears well-developed and well-nourished. No distress.   HENT:   Head: Normocephalic and atraumatic.   Eyes: Pupils are equal, round, and reactive to light.   Neck: Normal range of motion. Neck supple.   Cardiovascular: Normal rate, regular rhythm and normal heart sounds. Exam reveals no friction rub.   No murmur heard.  Pulmonary/Chest: Effort normal and breath sounds normal. No respiratory distress. He has no wheezes. He has no rales.   The sternum is " stable. No clicks.  The sternotomy incision is C/D/I.       Abdominal: Soft. He exhibits no distension. There is no tenderness. There is no guarding.   Musculoskeletal: He exhibits no edema.   Saphenectomy incision is c/d/i.     Neurological: He is alert and oriented to person, place, and time. No cranial nerve deficit.   Skin: Skin is warm and dry. No rash noted. He is not diaphoretic. No pallor.   Psychiatric: He has a normal mood and affect. His behavior is normal.   Vitals reviewed.      Assessment/Plan       Carlos A was seen today for post-op follow-up.    Diagnoses and all orders for this visit:    Bilateral carotid artery disease, unspecified type (CMS/HCC)  -     Ambulatory Referral to Vascular Surgery    Paroxysmal atrial fibrillation (CMS/HCC)    Thoracic aortic aneurysm without rupture (CMS/HCC)  -     CT Angiogram Chest With Contrast; Future    Coronary artery disease involving native coronary artery of native heart with angina pectoris (CMS/HCC)    Class 1 obesity due to excess calories with serious comorbidity and body mass index (BMI) of 33.0 to 33.9 in adult    Type 2 diabetes mellitus without complication, with long-term current use of insulin (CMS/HCC)    Diabetic complication (CMS/HCC)    Diabetic peripheral neuropathy (CMS/HCC)    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CMS/HCC)    Seizure disorder (CMS/HCC)         Overall, Carlos A Boyer  is doing well.  He is to continue with cardiac rehab.  We discussed his sternotomy precautions and how they are to be advanced over the next few months.  We discussed his preoperative workup identified lung nodules and a thoracic aortic aneurysm.  We discussed followup in six months. We discussed the criticality of ongoing risk factor modification including routine exercise and durable weight loss.  We discussed signs /symptoms of acute aortic pathology including signs/symptoms of lung nodules.  He is agreeable to surveillance.  Will defer to Dr. Ramey as to  further atrial fibrillation management.     Patient's Body mass index is 33.12 kg/m². BMI is above normal parameters. Recommendations include: educational material, referral to primary care and establishing durable lifestyle changes to accomplish an acceptable weight for age and height.    Carlos A Boyer Jr. is a non-smoker and therefore does not need tobacco cessation education/counseling.      RTC 6 months.

## 2019-09-24 ENCOUNTER — TELEPHONE (OUTPATIENT)
Dept: CARDIAC SURGERY | Facility: CLINIC | Age: 59
End: 2019-09-24

## 2019-09-24 ENCOUNTER — READMISSION MANAGEMENT (OUTPATIENT)
Dept: CALL CENTER | Facility: HOSPITAL | Age: 59
End: 2019-09-24

## 2019-09-24 NOTE — OUTREACH NOTE
CHF Week 3 Survey      Responses   Facility patient discharged from?  [facility]   Does the patient have one of the following disease processes/diagnoses(primary or secondary)?  CHF   Week 3 attempt successful?  Yes   Call start time  1000   Rescheduled  Rescheduled-pt requested   Discharge diagnosis  CHF          Vida Pope, RN

## 2019-09-25 ENCOUNTER — TREATMENT (OUTPATIENT)
Dept: CARDIAC REHAB | Facility: HOSPITAL | Age: 59
End: 2019-09-25

## 2019-09-25 DIAGNOSIS — Z95.1 S/P CABG (CORONARY ARTERY BYPASS GRAFT): Primary | ICD-10-CM

## 2019-09-25 PROCEDURE — 93798 PHYS/QHP OP CAR RHAB W/ECG: CPT

## 2019-09-26 ENCOUNTER — READMISSION MANAGEMENT (OUTPATIENT)
Dept: CALL CENTER | Facility: HOSPITAL | Age: 59
End: 2019-09-26

## 2019-09-26 NOTE — OUTREACH NOTE
CHF Week 3 Survey      Responses   Facility patient discharged from?  [facility]   Does the patient have one of the following disease processes/diagnoses(primary or secondary)?  CHF   Week 3 attempt successful?  No   Unsuccessful attempts  Attempt 1   Rescheduled  Revoked          Nola Shields RN

## 2019-09-30 ENCOUNTER — TELEPHONE (OUTPATIENT)
Dept: VASCULAR SURGERY | Facility: CLINIC | Age: 59
End: 2019-09-30

## 2019-09-30 ENCOUNTER — TREATMENT (OUTPATIENT)
Dept: CARDIAC REHAB | Facility: HOSPITAL | Age: 59
End: 2019-09-30

## 2019-09-30 DIAGNOSIS — Z95.1 S/P CABG (CORONARY ARTERY BYPASS GRAFT): Primary | ICD-10-CM

## 2019-09-30 PROCEDURE — 93798 PHYS/QHP OP CAR RHAB W/ECG: CPT

## 2019-09-30 NOTE — TELEPHONE ENCOUNTER
Left message reminding Mr Boyer of his appointment for Tuesday, October 1st, 2019 at 1030 am with Dr Echavarria. Also advised if he had any questions or needed to reschedule to please call the office at [phone].

## 2019-10-01 ENCOUNTER — OFFICE VISIT (OUTPATIENT)
Dept: VASCULAR SURGERY | Facility: CLINIC | Age: 59
End: 2019-10-01

## 2019-10-01 VITALS
DIASTOLIC BLOOD PRESSURE: 82 MMHG | SYSTOLIC BLOOD PRESSURE: 140 MMHG | HEIGHT: 72 IN | OXYGEN SATURATION: 98 % | BODY MASS INDEX: 33.48 KG/M2 | HEART RATE: 89 BPM | WEIGHT: 247.2 LBS

## 2019-10-01 DIAGNOSIS — I65.23 BILATERAL CAROTID ARTERY STENOSIS: Primary | ICD-10-CM

## 2019-10-01 DIAGNOSIS — E78.2 MIXED HYPERLIPIDEMIA: ICD-10-CM

## 2019-10-01 DIAGNOSIS — I73.9 PAD (PERIPHERAL ARTERY DISEASE) (HCC): ICD-10-CM

## 2019-10-01 DIAGNOSIS — I10 ESSENTIAL HYPERTENSION: ICD-10-CM

## 2019-10-01 PROCEDURE — 99204 OFFICE O/P NEW MOD 45 MIN: CPT | Performed by: SURGERY

## 2019-10-01 NOTE — PROGRESS NOTES
10/01/2019      Steven Tang MD  [address]  [address]  [place] [address]    Carlos A Boyer Jr.  1960    Chief Complaint   Patient presents with   • Establish Care     Referred over by Dr Tang for Moderate Asymptomatic Carotid Artery Stenosis. Test [id number]  Carotid Bilateral . Patient denies any stroke like symptoms.        Dear Steven Tang MD    HPI  I had the pleasure of seeing your patient Carlos A Boyer Jr. in the office today.  Thank you kindly for this consultation.  As you recall, Carlos A Boyer Jr. is a 59 y.o.  male who you are currently following for coronary artery disease status post CABG in July 2019.  He has a history of stroke with facial droop and left sided weakness at that time.  He is maintained Eliquis, and aspirin.  He reports he can not take statins.   He has a 4.4 cm ascending aortic aneurysm that is being followed by Dr. Tang.  He did have noninvasive testing performed, which I did review in office.        Past Medical History:   Diagnosis Date   • Arthritis    • Asthma    • Carotid disease, bilateral (CMS/HCC)    • Chest pain    • Chronic ischemic heart disease    • Chronic sinusitis    • Maribell bullosa    • Coronary artery disease    • Deviated septum    • Diabetes mellitus (CMS/HCC)    • Difficulty urinating    • Diverticulitis    • Enlarged prostate    • Fatty liver    • GERD (gastroesophageal reflux disease)    • Hyperlipidemia    • Hypertension    • Hypertrophy of nasal turbinates    • Keratoderma    • Kidney stone    • Migraine    • Murmur, heart    • Myocardial infarction (CMS/HCC)    • Obesity    • PONV (postoperative nausea and vomiting)    • Psoriasis    • Seizures (CMS/HCC)    • Sinus congestion    • Sleep apnea    • SOB (shortness of breath)    • Stroke (CMS/HCC)    • UTI (urinary tract infection)        Past Surgical History:   Procedure Laterality Date   • CARDIAC CATHETERIZATION  01/2016    Dr. Broadbent; widely patent previously placed stents in  the left anterior descending and obstructive disease involving the diagonal branch which was treated medically   • CARDIAC CATHETERIZATION N/A 7/14/2017    Procedure: Left Heart Cath;  Surgeon: Wade Ramey MD;  Location:  [facility];  Service:    • CARDIAC CATHETERIZATION Left 10/15/2018    Procedure: Cardiac Catheterization/Vascular Study;  Surgeon: Wade Ramey MD;  Location:  [facility];  Service: Cardiology   • CARDIAC CATHETERIZATION  10/15/2018    Procedure: Functional Flow [place];  Surgeon: Wade Ramey MD;  Location:  [facility];  Service: Cardiology   • CARDIAC CATHETERIZATION N/A 10/15/2018    Procedure: Left ventriculography;  Surgeon: Wade Ramye MD;  Location:  [facility];  Service: Cardiology   • CARDIAC CATHETERIZATION Left 6/26/2019    Procedure: Cardiac Catheterization/Vascular Study VEL OK  HE WILL WAIT 1 YEAR FOR SHOULDER SURGERY ;  Surgeon: aWde Ramey MD;  Location:  [facility];  Service: Cardiology   • CHOLECYSTECTOMY     • CHOLECYSTECTOMY WITH INTRAOPERATIVE CHOLANGIOGRAM N/A 8/1/2018    Procedure: CHOLECYSTECTOMY LAPAROSCOPIC INTRAOPERATIVE CHOLANGIOGRAM;  Surgeon: Shane Ann MD;  Location:  [facility] OR;  Service: General   • CORONARY ANGIOPLASTY     • CORONARY ARTERY BYPASS GRAFT N/A 7/6/2019    Procedure: CABG X2 WITH LIMA, LEFT LEG OVH, AND PLACEMENT OF LEFT FEMORAL ARTERIAL LINE;  Surgeon: Steven Tang MD;  Location: [facility] OR;  Service: Cardiothoracic   • CORONARY STENT PLACEMENT      x 6   • ENDOSCOPIC FUNCTIONAL SINUS SURGERY (FESS) Bilateral 12/13/2017    Procedure: PROCEDURE PERFORMED:  Bilateral functional endoscopic anterior ethmoidectomy with bilateral middle meatal antrostomy Septoplasty Right kathia bullosa resection Bilateral inferior turbinate reduction via Coblation;  Surgeon: Mayank Ibarra MD;  Location: [facility] OR;  Service:    • ENDOSCOPY N/A 7/30/2018     Procedure: ESOPHAGOGASTRODUODENOSCOPY WITH ANESTHESIA;  Surgeon: Benitez Mas MD;  Location: [facility] ENDOSCOPY;  Service: Gastroenterology   • HERNIA REPAIR      x2 inguinal area   • KIDNEY STONE SURGERY     • KNEE SURGERY Right    • OTHER SURGICAL HISTORY      urolift   • PROSTATE SURGERY      Dr. Badillo -    • THUMB AMPUTATION Left     partial   • TOE AMPUTATION Right     big       Family History   Problem Relation Age of Onset   • Heart disease Father    • COPD Mother    • Hypertension Mother    • Asthma Mother    • No Known Problems Sister    • Prostate cancer Maternal Grandfather    • No Known Problems Sister        Social History     Socioeconomic History   • Marital status:      Spouse name: Not on file   • Number of children: Not on file   • Years of education: Not on file   • Highest education level: Not on file   Tobacco Use   • Smoking status: Former Smoker     Packs/day: 1.50     Years: 4.50     Pack years: 6.75     Types: Cigarettes, Cigars     Last attempt to quit:      Years since quittin.7   • Smokeless tobacco: Former User     Types: Chew     Quit date:    Substance and Sexual Activity   • Alcohol use: No   • Drug use: No   • Sexual activity: Defer       Allergies   Allergen Reactions   • Flagyl [Metronidazole] Hives   • Atorvastatin Other (See Comments)     Muscle cramps   • Ciprofloxacin Hives         Current Outpatient Medications:   •  albuterol (PROVENTIL HFA;VENTOLIN HFA) 108 (90 BASE) MCG/ACT inhaler, Inhale 2 puffs Every 6 (Six) Hours As Needed for wheezing., Disp: , Rfl:   •  apixaban (ELIQUIS) 5 MG tablet tablet, Take 1 tablet by mouth Every 12 (Twelve) Hours., Disp: 60 tablet, Rfl: 5  •  aspirin 81 MG chewable tablet, Chew 81 mg Daily., Disp: , Rfl:   •  calcium polycarbophil (FIBERCON) 625 MG tablet, Take 625 mg by mouth Daily., Disp: , Rfl:   •  carboxymethylcellulose (REFRESH PLUS) 0.5 % solution, Administer 1 drop to both eyes 4 (Four) Times a Day As Needed  for Dry Eyes., Disp: , Rfl:   •  Empagliflozin (JARDIANCE) 10 MG tablet, Take 1 tablet by mouth Daily., Disp: , Rfl:   •  fluticasone (FLONASE) 50 MCG/ACT nasal spray, 2 sprays into each nostril Daily., Disp: , Rfl:   •  furosemide (LASIX) 40 MG tablet, TAKE 1 TABLET BY MOUTH ONCE DAILY AS NEEDED FOR WEIGHT GAIN GREATER THAN 2 LBS IN A DAY OR INCREASING CHEST CONGESTION, Disp: , Rfl: 0  •  insulin aspart (novoLOG FLEXPEN) 100 UNIT/ML solution pen-injector sc pen, Inject 30 Units under the skin into the appropriate area as directed Every Morning. 34 units  at breakfast, 30 units at lunch, 34 units hs, Disp: , Rfl:   •  Insulin Glargine (LANTUS SOLOSTAR) 100 UNIT/ML injection pen, Inject 60-90 Units under the skin into the appropriate area as directed 2 (Two) Times a Day. 65 units QAM AND 70 units QPM, Disp: , Rfl:   •  lamoTRIgine (LaMICtal) 200 MG tablet, Take 1 tablet by mouth 2 (Two) Times a Day., Disp: 180 tablet, Rfl: 1  •  levETIRAcetam (KEPPRA) 500 MG tablet, Take 3 tablets every morning, take 4 tablets every night. (Patient taking differently: 500 mg Take As Directed. Take 3 tablets every morning, take 4 tablets every night.), Disp: 630 tablet, Rfl: 1  •  Liraglutide (VICTOZA SC), Inject 1.2 mg under the skin into the appropriate area as directed Every Night. 1.2, Disp: , Rfl:   •  metFORMIN (GLUCOPHAGE) 1000 MG tablet, Take 1 tablet by mouth 2 (Two) Times a Day With Meals. HOLD x 48 hours post contrast. May resume 3/18/18, Disp: , Rfl:   •  metoprolol tartrate (LOPRESSOR) 50 MG tablet, Take 25 mg by mouth 2 (Two) Times a Day., Disp: , Rfl:   •  Multiple Vitamin (MULTI VITAMIN PO), Take 1 tablet by mouth Daily., Disp: , Rfl:   •  mupirocin (BACTROBAN) 2 % ointment, Apply 1 application topically to the appropriate area as directed 3 (Three) Times a Day., Disp: , Rfl:   •  nitroglycerin (NITROSTAT) 0.4 MG SL tablet, Place 0.4 mg under the tongue Every 5 (Five) Minutes As Needed for chest pain. Take no more than  "3 doses in 15 minutes., Disp: , Rfl:   •  ondansetron ODT (ZOFRAN-ODT) 4 MG disintegrating tablet, Take 1 tablet by mouth Every 4 (Four) Hours As Needed for Nausea or Vomiting., Disp: 10 tablet, Rfl: 0  •  pantoprazole (PROTONIX) 40 MG EC tablet, Take 40 mg by mouth 2 (Two) Times a Day., Disp: , Rfl:   •  psyllium (METAMUCIL) 58.6 % packet, Take 1 packet by mouth Daily As Needed (constipation)., Disp: , Rfl:   •  urea (CARMOL) 40 % ointment, Apply  topically to the appropriate area as directed As Needed for Dry Skin., Disp: , Rfl:      Review of Systems   Constitutional: Negative.    HENT: Negative.    Eyes: Negative.    Respiratory: Negative.    Cardiovascular: Negative.    Gastrointestinal: Negative.    Endocrine: Negative.    Genitourinary: Negative.    Musculoskeletal: Negative.    Skin: Negative.    Allergic/Immunologic: Negative.    Neurological: Negative.    Hematological: Negative.    Psychiatric/Behavioral: Negative.    All other systems reviewed and are negative.    /82 (BP Location: Right arm, Patient Position: Sitting, Cuff Size: Adult)   Pulse 89   Ht 182.9 cm (72\")   Wt 112 kg (247 lb 3.2 oz)   SpO2 98%   BMI 33.53 kg/m²     Physical Exam   Constitutional: He is oriented to person, place, and time. He appears well-developed and well-nourished.   HENT:   Head: Normocephalic and atraumatic.   Eyes: Pupils are equal, round, and reactive to light. No scleral icterus.   Neck: Neck supple. No JVD present. Carotid bruit is not present. No thyromegaly present.   Cardiovascular: Normal rate, regular rhythm and normal heart sounds.   Pulses:       Carotid pulses are 2+ on the right side, and 2+ on the left side.       Femoral pulses are 2+ on the right side, and 2+ on the left side.       Popliteal pulses are 2+ on the right side, and 2+ on the left side.        Dorsalis pedis pulses are 2+ on the right side, and 2+ on the left side.        Posterior tibial pulses are 2+ on the right side, and 2+ on " the left side.   Pulmonary/Chest: Effort normal and breath sounds normal.   Abdominal: Soft. Bowel sounds are normal. He exhibits no distension, no abdominal bruit and no mass. There is no hepatosplenomegaly. There is no tenderness.   Musculoskeletal: Normal range of motion. He exhibits no edema.   Lymphadenopathy:     He has no cervical adenopathy.   Neurological: He is alert and oriented to person, place, and time. He has normal strength. No cranial nerve deficit or sensory deficit.   Skin: Skin is warm, dry and intact.   Psychiatric: He has a normal mood and affect.   Nursing note and vitals reviewed.    Diagnostic data:  History: Carotid occlusive disease     IMPRESSION:  Impression:  1. There is less than 50% stenosis of the right internal carotid artery.  2. There is 50-69% stenosis of the left internal carotid artery.  3. Antegrade flow is demonstrated in bilateral vertebral arteries.     Comments: Bilateral carotid vertebral arterial duplex scan was  performed.     Grayscale imaging shows intimal thickening and calcified elements at the  carotid bifurcation. The right internal carotid artery peak systolic  velocity is 107 cm/sec. The end-diastolic velocity is 29.1 cm/sec. The  right ICA/CCA ratio is approximately 0.94 . These findings correlate  with less than 50% stenosis of the right internal carotid artery.     Grayscale imaging shows intimal thickening and calcified elements at the  carotid bifurcation. The left internal carotid artery peak systolic  velocity is 128 cm/sec. The end-diastolic velocity is 40.1 cm/sec. The  left ICA/CCA ratio is approximately 0.90 . These findings correlate with  50-69% stenosis of the left internal carotid artery.     Antegrade flow is demonstrated in bilateral vertebral arteries.  Greater than 50% stenosis of the left common carotid artery.  Greater than 50% stenosis of the proximal bilateral external carotid  arteries.  This report was finalized on 06/27/2019 13:22 by  Dr. Gonzales Marks MD.    Patient Active Problem List   Diagnosis   • Type 2 diabetes mellitus without complication, with long-term current use of insulin (CMS/[facility])   • Gastroesophageal reflux disease without esophagitis   • Essential hypertension   • Seizure disorder (CMS/[facility])   • Carotid disease, bilateral (CMS/[facility])   • Coronary artery disease involving native coronary artery of native heart with angina pectoris (CMS/HCC)   • Mixed hyperlipidemia   • Chronic left arterial ischemic stroke, ICA (internal carotid artery)   • HOA on CPAP   • Benign non-nodular prostatic hyperplasia with lower urinary tract symptoms   • Deviated nasal septum   • Maribell bullosa   • Hypertrophy of both inferior nasal turbinates   • Chronic sinusitis of both maxillary sinuses   • S/P FESS (functional endoscopic sinus surgery)   • Diabetic complication (CMS/[facility])   • Epigastric pain   • Diabetic peripheral neuropathy (CMS/[facility])   • Abnormal stress test   • Stenosis of left carotid artery   • Class 1 obesity in adult   • 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CMS/[facility])   • History of seizure   • Lung nodules   • Cardiac murmur   • Thoracic aortic aneurysm without rupture (CMS/[facility])   • Paroxysmal atrial fibrillation (CMS/[facility])   • Atrial flutter (CMS/[facility]) with cardioversion on Sept 5, 2019   • Acute diastolic congestive heart failure (CMS/[facility])   • Fluid overload   • Pleural effusion   • History of hyperlipidemia   • Respiratory distress        Diagnosis Plan   1. Bilateral carotid artery stenosis  US Carotid Bilateral   2. Essential hypertension  US Ankle / Brachial Indices Extremity Complete   3. Mixed hyperlipidemia  US Ankle / Brachial Indices Extremity Complete   4. PAD (peripheral artery disease) (CMS/[facility])         Plan: After thoroughly evaluating Carlos A Boyer Jr., I believe the best course of action is to remain conservative from a vascular surgery standpoint.  Currently he remains asymptomatic from a strokelike standpoint.  His carotid  duplex shows moderate disease on the left side.  We will continue with medical management going forward.  I will see him back in 1 years time with a repeat carotid duplex for continued surveillance. I did discuss vascular risk factors as they pertain to the progression of vascular disease including controlling hypertension and hyperlipidemia.  These risk factors are currently stable. The patient is to continue taking their medications as previously discussed.   This was all discussed in full with complete understanding.  Thank you for allowing me to participate in the care of your patient.  Please do not hesitate to call with any questions or concerns.  We will keep you aware of any further encounters with Carlos A Boyer Jr..        Sincerely yours,         DO Bren Rodríguez Jamie Walker, PA

## 2019-10-02 ENCOUNTER — TREATMENT (OUTPATIENT)
Dept: CARDIAC REHAB | Facility: HOSPITAL | Age: 59
End: 2019-10-02

## 2019-10-02 DIAGNOSIS — Z95.1 S/P CABG (CORONARY ARTERY BYPASS GRAFT): Primary | ICD-10-CM

## 2019-10-02 PROCEDURE — 93798 PHYS/QHP OP CAR RHAB W/ECG: CPT

## 2019-10-04 ENCOUNTER — TREATMENT (OUTPATIENT)
Dept: CARDIAC REHAB | Facility: HOSPITAL | Age: 59
End: 2019-10-04

## 2019-10-04 DIAGNOSIS — Z95.1 S/P CABG (CORONARY ARTERY BYPASS GRAFT): Primary | ICD-10-CM

## 2019-10-04 PROCEDURE — 93798 PHYS/QHP OP CAR RHAB W/ECG: CPT

## 2019-10-07 ENCOUNTER — TREATMENT (OUTPATIENT)
Dept: CARDIAC REHAB | Facility: HOSPITAL | Age: 59
End: 2019-10-07

## 2019-10-07 DIAGNOSIS — Z95.1 S/P CABG (CORONARY ARTERY BYPASS GRAFT): Primary | ICD-10-CM

## 2019-10-07 PROCEDURE — 93798 PHYS/QHP OP CAR RHAB W/ECG: CPT

## 2019-10-09 ENCOUNTER — TREATMENT (OUTPATIENT)
Dept: CARDIAC REHAB | Facility: HOSPITAL | Age: 59
End: 2019-10-09

## 2019-10-09 DIAGNOSIS — Z95.1 S/P CABG (CORONARY ARTERY BYPASS GRAFT): Primary | ICD-10-CM

## 2019-10-09 PROCEDURE — 93798 PHYS/QHP OP CAR RHAB W/ECG: CPT

## 2019-10-10 NOTE — TELEPHONE ENCOUNTER
Called to follow up on this. The [facility] is supposed to give me a call back.

## 2019-10-11 NOTE — TELEPHONE ENCOUNTER
Lucy with [facility] left vm message returning call to Anastasia re: referral to Dr Echavarria.  Needs info (purpose of referral and if any testing requested).  Can reach her at #[phone]/[email or web address]

## 2019-10-14 ENCOUNTER — TREATMENT (OUTPATIENT)
Dept: CARDIAC REHAB | Facility: HOSPITAL | Age: 59
End: 2019-10-14

## 2019-10-14 DIAGNOSIS — Z95.1 S/P CABG (CORONARY ARTERY BYPASS GRAFT): Primary | ICD-10-CM

## 2019-10-14 PROCEDURE — 93798 PHYS/QHP OP CAR RHAB W/ECG: CPT

## 2019-10-16 ENCOUNTER — TREATMENT (OUTPATIENT)
Dept: CARDIAC REHAB | Facility: HOSPITAL | Age: 59
End: 2019-10-16

## 2019-10-16 DIAGNOSIS — Z95.1 S/P CABG (CORONARY ARTERY BYPASS GRAFT): Primary | ICD-10-CM

## 2019-10-16 PROCEDURE — 93798 PHYS/QHP OP CAR RHAB W/ECG: CPT

## 2019-10-18 ENCOUNTER — TREATMENT (OUTPATIENT)
Dept: CARDIAC REHAB | Facility: HOSPITAL | Age: 59
End: 2019-10-18

## 2019-10-18 DIAGNOSIS — Z95.1 S/P CABG (CORONARY ARTERY BYPASS GRAFT): Primary | ICD-10-CM

## 2019-10-18 PROCEDURE — 93798 PHYS/QHP OP CAR RHAB W/ECG: CPT

## 2019-10-21 ENCOUNTER — TREATMENT (OUTPATIENT)
Dept: CARDIAC REHAB | Facility: HOSPITAL | Age: 59
End: 2019-10-21

## 2019-10-21 DIAGNOSIS — Z95.1 S/P CABG (CORONARY ARTERY BYPASS GRAFT): Primary | ICD-10-CM

## 2019-10-21 PROCEDURE — 93798 PHYS/QHP OP CAR RHAB W/ECG: CPT

## 2019-10-24 ENCOUNTER — TELEPHONE (OUTPATIENT)
Dept: CARDIAC SURGERY | Facility: CLINIC | Age: 59
End: 2019-10-24

## 2019-10-24 NOTE — TELEPHONE ENCOUNTER
"Pt calling to report he is still \"arguing\" with [facility] over [facility] carotid artery testing.  Pt states as soon as he hears back from [facility] re: this, he will get back in touch with our office/kahm  "

## 2019-10-25 ENCOUNTER — APPOINTMENT (OUTPATIENT)
Dept: GENERAL RADIOLOGY | Facility: HOSPITAL | Age: 59
End: 2019-10-25

## 2019-10-25 ENCOUNTER — TELEPHONE (OUTPATIENT)
Dept: CARDIOLOGY | Facility: CLINIC | Age: 59
End: 2019-10-25

## 2019-10-25 ENCOUNTER — TREATMENT (OUTPATIENT)
Dept: CARDIAC REHAB | Facility: HOSPITAL | Age: 59
End: 2019-10-25

## 2019-10-25 ENCOUNTER — HOSPITAL ENCOUNTER (EMERGENCY)
Facility: HOSPITAL | Age: 59
Discharge: HOME | End: 2019-10-25
Attending: EMERGENCY MEDICINE | Admitting: EMERGENCY MEDICINE

## 2019-10-25 VITALS
BODY MASS INDEX: 34.13 KG/M2 | TEMPERATURE: 97.8 F | WEIGHT: 252 LBS | RESPIRATION RATE: 18 BRPM | OXYGEN SATURATION: 94 % | SYSTOLIC BLOOD PRESSURE: 154 MMHG | HEART RATE: 85 BPM | HEIGHT: 72 IN | DIASTOLIC BLOOD PRESSURE: 93 MMHG

## 2019-10-25 DIAGNOSIS — R60.9 FLUID RETENTION: Primary | ICD-10-CM

## 2019-10-25 DIAGNOSIS — Z95.1 S/P CABG (CORONARY ARTERY BYPASS GRAFT): Primary | ICD-10-CM

## 2019-10-25 LAB
ALBUMIN SERPL-MCNC: 4.2 G/DL (ref 3.5–5.2)
ALBUMIN/GLOB SERPL: 1.4 G/DL
ALP SERPL-CCNC: 96 U/L (ref 39–117)
ALT SERPL W P-5'-P-CCNC: 30 U/L (ref 1–41)
ANION GAP SERPL CALCULATED.3IONS-SCNC: 13 MMOL/L (ref 5–15)
APTT PPP: 24.1 SECONDS (ref 24.1–35)
AST SERPL-CCNC: 26 U/L (ref 1–40)
BASOPHILS # BLD AUTO: 0.02 10*3/MM3 (ref 0–0.2)
BASOPHILS NFR BLD AUTO: 0.4 % (ref 0–1.5)
BILIRUB SERPL-MCNC: 0.4 MG/DL (ref 0.2–1.2)
BUN BLD-MCNC: 21 MG/DL (ref 8–23)
BUN/CREAT SERPL: 21.6 (ref 7–25)
CALCIUM SPEC-SCNC: 9.1 MG/DL (ref 8.6–10.5)
CHLORIDE SERPL-SCNC: 101 MMOL/L (ref 98–107)
CK SERPL-CCNC: 243 U/L (ref 20–200)
CO2 SERPL-SCNC: 23 MMOL/L (ref 22–29)
CREAT BLD-MCNC: 0.97 MG/DL (ref 0.76–1.27)
DEPRECATED RDW RBC AUTO: 43.8 FL (ref 37–54)
EOSINOPHIL # BLD AUTO: 0.15 10*3/MM3 (ref 0–0.4)
EOSINOPHIL NFR BLD AUTO: 3.1 % (ref 0.3–6.2)
ERYTHROCYTE [DISTWIDTH] IN BLOOD BY AUTOMATED COUNT: 15.1 % (ref 12.3–15.4)
GFR SERPL CREATININE-BSD FRML MDRD: 79 ML/MIN/1.73
GLOBULIN UR ELPH-MCNC: 3.1 GM/DL
GLUCOSE BLD-MCNC: 160 MG/DL (ref 65–99)
HCT VFR BLD AUTO: 38.9 % (ref 37.5–51)
HGB BLD-MCNC: 12.5 G/DL (ref 13–17.7)
INR PPP: 0.96 (ref 0.91–1.09)
LYMPHOCYTES # BLD AUTO: 1.8 10*3/MM3 (ref 0.7–3.1)
LYMPHOCYTES NFR BLD AUTO: 37.5 % (ref 19.6–45.3)
MAGNESIUM SERPL-MCNC: 2.1 MG/DL (ref 1.6–2.4)
MCH RBC QN AUTO: 25.6 PG (ref 26.6–33)
MCHC RBC AUTO-ENTMCNC: 32.1 G/DL (ref 31.5–35.7)
MCV RBC AUTO: 79.7 FL (ref 79–97)
MONOCYTES # BLD AUTO: 0.45 10*3/MM3 (ref 0.1–0.9)
MONOCYTES NFR BLD AUTO: 9.4 % (ref 5–12)
NEUTROPHILS # BLD AUTO: 2.36 10*3/MM3 (ref 1.7–7)
NEUTROPHILS NFR BLD AUTO: 49.2 % (ref 42.7–76)
NT-PROBNP SERPL-MCNC: 43.7 PG/ML (ref 5–900)
PLATELET # BLD AUTO: 118 10*3/MM3 (ref 140–450)
PMV BLD AUTO: 11.1 FL (ref 6–12)
POTASSIUM BLD-SCNC: 4.9 MMOL/L (ref 3.5–5.2)
PROT SERPL-MCNC: 7.3 G/DL (ref 6–8.5)
PROTHROMBIN TIME: 13.1 SECONDS (ref 11.9–14.6)
RBC # BLD AUTO: 4.88 10*6/MM3 (ref 4.14–5.8)
SODIUM BLD-SCNC: 137 MMOL/L (ref 136–145)
TROPONIN T SERPL-MCNC: <0.01 NG/ML (ref 0–0.03)
TSH SERPL DL<=0.05 MIU/L-ACNC: 1.7 UIU/ML (ref 0.27–4.2)
WBC NRBC COR # BLD: 4.8 10*3/MM3 (ref 3.4–10.8)

## 2019-10-25 PROCEDURE — 84443 ASSAY THYROID STIM HORMONE: CPT | Performed by: EMERGENCY MEDICINE

## 2019-10-25 PROCEDURE — 84484 ASSAY OF TROPONIN QUANT: CPT | Performed by: EMERGENCY MEDICINE

## 2019-10-25 PROCEDURE — 83880 ASSAY OF NATRIURETIC PEPTIDE: CPT | Performed by: EMERGENCY MEDICINE

## 2019-10-25 PROCEDURE — 25010000002 FUROSEMIDE PER 20 MG: Performed by: EMERGENCY MEDICINE

## 2019-10-25 PROCEDURE — 93005 ELECTROCARDIOGRAM TRACING: CPT

## 2019-10-25 PROCEDURE — 83735 ASSAY OF MAGNESIUM: CPT | Performed by: EMERGENCY MEDICINE

## 2019-10-25 PROCEDURE — 93010 ELECTROCARDIOGRAM REPORT: CPT | Performed by: INTERNAL MEDICINE

## 2019-10-25 PROCEDURE — 85730 THROMBOPLASTIN TIME PARTIAL: CPT | Performed by: EMERGENCY MEDICINE

## 2019-10-25 PROCEDURE — 82550 ASSAY OF CK (CPK): CPT | Performed by: EMERGENCY MEDICINE

## 2019-10-25 PROCEDURE — 96374 THER/PROPH/DIAG INJ IV PUSH: CPT

## 2019-10-25 PROCEDURE — 99284 EMERGENCY DEPT VISIT MOD MDM: CPT

## 2019-10-25 PROCEDURE — 93798 PHYS/QHP OP CAR RHAB W/ECG: CPT

## 2019-10-25 PROCEDURE — 85610 PROTHROMBIN TIME: CPT | Performed by: EMERGENCY MEDICINE

## 2019-10-25 PROCEDURE — 80053 COMPREHEN METABOLIC PANEL: CPT | Performed by: EMERGENCY MEDICINE

## 2019-10-25 PROCEDURE — 85025 COMPLETE CBC W/AUTO DIFF WBC: CPT | Performed by: EMERGENCY MEDICINE

## 2019-10-25 PROCEDURE — 71045 X-RAY EXAM CHEST 1 VIEW: CPT

## 2019-10-25 RX ORDER — SODIUM CHLORIDE 0.9 % (FLUSH) 0.9 %
10 SYRINGE (ML) INJECTION AS NEEDED
Status: DISCONTINUED | OUTPATIENT
Start: 2019-10-25 | End: 2019-10-25 | Stop reason: HOSPADM

## 2019-10-25 RX ORDER — FUROSEMIDE 10 MG/ML
40 INJECTION INTRAMUSCULAR; INTRAVENOUS ONCE
Status: COMPLETED | OUTPATIENT
Start: 2019-10-25 | End: 2019-10-25

## 2019-10-25 RX ADMIN — FUROSEMIDE 40 MG: 10 INJECTION, SOLUTION INTRAVENOUS at 20:49

## 2019-10-25 NOTE — TELEPHONE ENCOUNTER
PT CALLED BACK WHEN HE GOT HOME HR WAS 97 BP /85.  WANTS TO KNOW IF HE CAN SEE YOU SOON OR GO TO ER

## 2019-10-25 NOTE — TELEPHONE ENCOUNTER
PT CAME TO OFFICE AFTER REHAB TO LET US KNOW HE WEIGHTED 242 ON Monday AND WEIGHS 248 TODAY.  NO SOB OR EDEMA THAT HE KNOWS OF.    PLEASE ADVISE

## 2019-10-25 NOTE — ED PROVIDER NOTES
Subjective   Patient says he had bypass surgery in July and has been in cardiac rehab since then.  Each time they weigh him and he has been staying around 242 for some time now.  He was in rehab on Monday and then this Wednesday and then went back today.  When he got to rehab he was weighing 248 and they are worried because that 6 pound weight gain was out of what they would have expected over the past several days.  He has not had any edema in his feet are any chest pain or anything like that.  As a matter fact he did not have any shortness of breath to this afternoon.  He had back home and weighed himself and he says he was up to 252 is not feeling of mild shortness of breath so he came in to be checked out.  He did go to Dr. Ramey's office after the cardiac rehab but they did not have a call him back this afternoon.        History provided by:  Patient   used: No    Shortness of Breath   Severity:  Mild  Onset quality:  Gradual  Duration:  1 day  Timing:  Constant  Progression:  Worsening  Chronicity:  New  Context: not activity, not animal exposure, not emotional upset, not fumes, not known allergens, not occupational exposure, not pollens, not smoke exposure, not strong odors, not URI and not weather changes    Relieved by:  Nothing  Worsened by:  Nothing  Ineffective treatments:  None tried  Associated symptoms: no abdominal pain, no chest pain, no diaphoresis, no ear pain, no fever, no hemoptysis, no neck pain, no rash, no sore throat, no sputum production and no swollen glands    Risk factors: recent surgery    Risk factors: no recent alcohol use, no family hx of DVT, no hx of cancer, no hx of PE/DVT, no obesity, no oral contraceptive use and no prolonged immobilization        Review of Systems   Constitutional: Negative.  Negative for diaphoresis and fever.   HENT: Negative.  Negative for ear pain and sore throat.    Respiratory: Positive for shortness of breath. Negative for hemoptysis  and sputum production.    Cardiovascular: Negative for chest pain.   Gastrointestinal: Negative.  Negative for abdominal pain.   Genitourinary: Negative.    Musculoskeletal: Negative.  Negative for neck pain.   Skin: Negative.  Negative for rash.   Neurological: Negative.    Psychiatric/Behavioral: Negative.    All other systems reviewed and are negative.      Past Medical History:   Diagnosis Date   • Arthritis    • Asthma    • Carotid disease, bilateral (CMS/HCC)    • Chest pain    • Chronic ischemic heart disease    • Chronic sinusitis    • Maribell bullosa    • Coronary artery disease    • Deviated septum    • Diabetes mellitus (CMS/HCC)    • Difficulty urinating    • Diverticulitis    • Enlarged prostate    • Fatty liver    • GERD (gastroesophageal reflux disease)    • Hyperlipidemia    • Hypertension    • Hypertrophy of nasal turbinates    • Keratoderma    • Kidney stone    • Migraine    • Murmur, heart    • Myocardial infarction (CMS/HCC)    • Obesity    • PONV (postoperative nausea and vomiting)    • Psoriasis    • Seizures (CMS/HCC)    • Sinus congestion    • Sleep apnea    • SOB (shortness of breath)    • Stroke (CMS/HCC)    • UTI (urinary tract infection)        Allergies   Allergen Reactions   • Flagyl [Metronidazole] Hives   • Atorvastatin Other (See Comments)     Muscle cramps   • Ciprofloxacin Hives       Past Surgical History:   Procedure Laterality Date   • CARDIAC CATHETERIZATION  01/2016    Dr. Broadbent; widely patent previously placed stents in the left anterior descending and obstructive disease involving the diagonal branch which was treated medically   • CARDIAC CATHETERIZATION N/A 7/14/2017    Procedure: Left Heart Cath;  Surgeon: Wade Ramey MD;  Location:  [facility] CATH INVASIVE LOCATION;  Service:    • CARDIAC CATHETERIZATION Left 10/15/2018    Procedure: Cardiac Catheterization/Vascular Study;  Surgeon: Wade Ramey MD;  Location:  [facility] CATH [facility];  Service: Cardiology   •  CARDIAC CATHETERIZATION  10/15/2018    Procedure: Functional Flow [place];  Surgeon: Wade Ramey MD;  Location:  [facility] CATH INVASIVE [facility];  Service: Cardiology   • CARDIAC CATHETERIZATION N/A 10/15/2018    Procedure: Left ventriculography;  Surgeon: Wade Ramey MD;  Location:  [facility] CATH INV[facility];  Service: Cardiology   • CARDIAC CATHETERIZATION Left 6/26/2019    Procedure: Cardiac Catheterization/Vascular Study VEL OK  HE WILL WAIT 1 YEAR FOR SHOULDER SURGERY ;  Surgeon: Wade Ramey MD;  Location: [facility] CATH INVASIVE LOCATION;  Service: Cardiology   • CHOLECYSTECTOMY     • CHOLECYSTECTOMY WITH INTRAOPERATIVE CHOLANGIOGRAM N/A 8/1/2018    Procedure: CHOLECYSTECTOMY LAPAROSCOPIC INTRAOPERATIVE CHOLANGIOGRAM;  Surgeon: Shane Ann MD;  Location: [facility] OR;  Service: General   • CORONARY ANGIOPLASTY     • CORONARY ARTERY BYPASS GRAFT N/A 7/6/2019    Procedure: CABG X2 WITH LIMA, LEFT LEG OVH, AND PLACEMENT OF LEFT FEMORAL ARTERIAL LINE;  Surgeon: Steven Tang MD;  Location: [facility] OR;  Service: Cardiothoracic   • CORONARY STENT PLACEMENT      x 6   • ENDOSCOPIC FUNCTIONAL SINUS SURGERY (FESS) Bilateral 12/13/2017    Procedure: PROCEDURE PERFORMED:  Bilateral functional endoscopic anterior ethmoidectomy with bilateral middle meatal antrostomy Septoplasty Right kathia bullosa resection Bilateral inferior turbinate reduction via Coblation;  Surgeon: Mayank Ibarra MD;  Location: [facility] OR;  Service:    • ENDOSCOPY N/A 7/30/2018    Procedure: ESOPHAGOGASTRODUODENOSCOPY WITH ANESTHESIA;  Surgeon: Benitez Mas MD;  Location: [facility] ENDOSCOPY;  Service: Gastroenterology   • HERNIA REPAIR      x2 inguinal area   • KIDNEY STONE SURGERY     • KNEE SURGERY Right    • OTHER SURGICAL HISTORY      urolift   • PROSTATE SURGERY      Dr. Badillo - 2017   • THUMB AMPUTATION Left     partial   • TOE AMPUTATION Right     big       Family History   Problem Relation Age of Onset   • Heart disease  Father    • COPD Mother    • Hypertension Mother    • Asthma Mother    • No Known Problems Sister    • Prostate cancer Maternal Grandfather    • No Known Problems Sister        Social History     Socioeconomic History   • Marital status:      Spouse name: Not on file   • Number of children: Not on file   • Years of education: Not on file   • Highest education level: Not on file   Tobacco Use   • Smoking status: Former Smoker     Packs/day: 1.50     Years: 4.50     Pack years: 6.75     Types: Cigarettes, Cigars     Last attempt to quit:      Years since quittin.8   • Smokeless tobacco: Former User     Types: Chew     Quit date:    Substance and Sexual Activity   • Alcohol use: No   • Drug use: No   • Sexual activity: Defer       Prior to Admission medications    Medication Sig Start Date End Date Taking? Authorizing Provider   albuterol (PROVENTIL HFA;VENTOLIN HFA) 108 (90 BASE) MCG/ACT inhaler Inhale 2 puffs Every 6 (Six) Hours As Needed for wheezing.    Rohan Christina MD   apixaban (ELIQUIS) 5 MG tablet tablet Take 1 tablet by mouth Every 12 (Twelve) Hours. 19   Ananya Avila APRN   aspirin 81 MG chewable tablet Chew 81 mg Daily.    Rohan Christina MD   calcium polycarbophil (FIBERCON) 625 MG tablet Take 625 mg by mouth Daily.    Rohan Christina MD   carboxymethylcellulose (REFRESH PLUS) 0.5 % solution Administer 1 drop to both eyes 4 (Four) Times a Day As Needed for Dry Eyes.    Rohan Christina MD   Empagliflozin (JARDIANCE) 10 MG tablet Take 1 tablet by mouth Daily.    Rohan Christina MD   fluticasone (FLONASE) 50 MCG/ACT nasal spray 2 sprays into each nostril Daily.    Rohan Christina MD   furosemide (LASIX) 40 MG tablet TAKE 1 TABLET BY MOUTH ONCE DAILY AS NEEDED FOR WEIGHT GAIN GREATER THAN 2 LBS IN A DAY OR INCREASING CHEST CONGESTION 19   Rohan Christina MD   insulin aspart (novoLOG FLEXPEN) 100 UNIT/ML solution pen-injector sc pen  Inject 30 Units under the skin into the appropriate area as directed Every Morning. 34 units  at breakfast, 30 units at lunch, 34 units hs    Rohan Christina MD   Insulin Glargine (LANTUS SOLOSTAR) 100 UNIT/ML injection pen Inject 60-90 Units under the skin into the appropriate area as directed 2 (Two) Times a Day. 65 units QAM AND 70 units QPM    Rohan Christina MD   lamoTRIgine (LaMICtal) 200 MG tablet Take 1 tablet by mouth 2 (Two) Times a Day. 7/22/19   Usha Hammer APRN   levETIRAcetam (KEPPRA) 500 MG tablet Take 3 tablets every morning, take 4 tablets every night.  Patient taking differently: 500 mg Take As Directed. Take 3 tablets every morning, take 4 tablets every night. 7/22/19   Usha Hammer APRN   Liraglutide (VICTOZA SC) Inject 1.2 mg under the skin into the appropriate area as directed Every Night. 1.2    Rohan Christina MD   metFORMIN (GLUCOPHAGE) 1000 MG tablet Take 1 tablet by mouth 2 (Two) Times a Day With Meals. HOLD x 48 hours post contrast. May resume 3/18/18 3/16/18   Meghan Salazar APRN   metoprolol tartrate (LOPRESSOR) 50 MG tablet Take 25 mg by mouth 2 (Two) Times a Day.    Rohan Christina MD   Multiple Vitamin (MULTI VITAMIN PO) Take 1 tablet by mouth Daily.    Rohan Christina MD   mupirocin (BACTROBAN) 2 % ointment Apply 1 application topically to the appropriate area as directed 3 (Three) Times a Day.    Rohan Christina MD   nitroglycerin (NITROSTAT) 0.4 MG SL tablet Place 0.4 mg under the tongue Every 5 (Five) Minutes As Needed for chest pain. Take no more than 3 doses in 15 minutes.    Rohan Christina MD   ondansetron ODT (ZOFRAN-ODT) 4 MG disintegrating tablet Take 1 tablet by mouth Every 4 (Four) Hours As Needed for Nausea or Vomiting. 5/30/19   Andriy Bo Jr., MD   pantoprazole (PROTONIX) 40 MG EC tablet Take 40 mg by mouth 2 (Two) Times a Day.    Rohan Christina MD   psyllium (METAMUCIL) 58.6 % packet Take 1 packet  by mouth Daily As Needed (constipation).    ProviderRohan MD   urea (CARMOL) 40 % ointment Apply  topically to the appropriate area as directed As Needed for Dry Skin.    ProviderRohan MD       Medications   sodium chloride 0.9 % flush 10 mL (not administered)       Vitals:    10/25/19 1930   BP:    Pulse: 81   Resp:    Temp:    SpO2: 97%         Objective   Physical Exam   Constitutional: He is oriented to person, place, and time. He appears well-developed and well-nourished.   HENT:   Head: Normocephalic and atraumatic.   Neck: Normal range of motion. Neck supple.   Cardiovascular: Normal rate and regular rhythm.   Pulmonary/Chest: Effort normal and breath sounds normal.   Abdominal: Soft. Bowel sounds are normal.   Musculoskeletal: Normal range of motion.        Right lower leg: Normal. He exhibits no edema.        Left lower leg: Normal. He exhibits no edema.   Neurological: He is alert and oriented to person, place, and time.   Skin: Skin is warm and dry. Capillary refill takes less than 2 seconds.   Psychiatric: He has a normal mood and affect. His behavior is normal.   Nursing note and vitals reviewed.      Procedures         Lab Results (last 24 hours)     Procedure Component Value Units Date/Time    CBC & Differential [[id number]] Collected:  10/25/19 1807    Specimen:  Blood Updated:  10/25/19 1820    Narrative:       The following orders were created for panel order CBC & Differential.  Procedure                               Abnormality         Status                     ---------                               -----------         ------                     CBC Auto Differential[[id number]]        Abnormal            Final result                 Please view results for these tests on the individual orders.    Comprehensive Metabolic Panel [[id number]]  (Abnormal) Collected:  10/25/19 1807    Specimen:  Blood Updated:  10/25/19 1859     Glucose 160 mg/dL      BUN 21 mg/dL      Creatinine 0.97  mg/dL      Sodium 137 mmol/L      Potassium 4.9 mmol/L      Chloride 101 mmol/L      CO2 23.0 mmol/L      Calcium 9.1 mg/dL      Total Protein 7.3 g/dL      Albumin 4.20 g/dL      ALT (SGPT) 30 U/L      AST (SGOT) 26 U/L      Comment: Specimen hemolyzed.  Results may be affected.        Alkaline Phosphatase 96 U/L      Total Bilirubin 0.4 mg/dL      eGFR Non African Amer 79 mL/min/1.73      Globulin 3.1 gm/dL      A/G Ratio 1.4 g/dL      BUN/Creatinine Ratio 21.6     Anion Gap 13.0 mmol/L     Narrative:       GFR Normal >60  Chronic Kidney Disease <60  Kidney Failure <15    Protime-INR [[id number]]  (Normal) Collected:  10/25/19 1807    Specimen:  Blood Updated:  10/25/19 1825     Protime 13.1 Seconds      INR 0.96    aPTT [[id number]]  (Normal) Collected:  10/25/19 1807    Specimen:  Blood Updated:  10/25/19 1825     PTT 24.1 seconds     Troponin [[id number]]  (Normal) Collected:  10/25/19 1807    Specimen:  Blood Updated:  10/25/19 1850     Troponin T <0.010 ng/mL     Narrative:       Troponin T Reference Range:  <= 0.03 ng/mL-   Negative for AMI  >0.03 ng/mL-     Abnormal for myocardial necrosis.  Clinicians would have to utilize clinical acumen, EKG, Troponin and serial changes to determine if it is an Acute Myocardial Infarction or myocardial injury due to an underlying chronic condition.     BNP [[id number]]  (Normal) Collected:  10/25/19 1807    Specimen:  Blood Updated:  10/25/19 1849     proBNP 43.7 pg/mL     Narrative:       Among patients with dyspnea, NT-proBNP is highly sensitive for the detection of acute congestive heart failure. In addition NT-proBNP of <300 pg/ml effectively rules out acute congestive heart failure with 99% negative predictive value.    Magnesium [[id number]]  (Normal) Collected:  10/25/19 1807    Specimen:  Blood Updated:  10/25/19 1850     Magnesium 2.1 mg/dL     TSH [[id number]]  (Normal) Collected:  10/25/19 1807    Specimen:  Blood Updated:  10/25/19 1850     TSH 1.700 uIU/mL     CK  [[id number]]  (Abnormal) Collected:  10/25/19 1807    Specimen:  Blood Updated:  10/25/19 1850     Creatine Kinase 243 U/L     CBC Auto Differential [[id number]]  (Abnormal) Collected:  10/25/19 1807    Specimen:  Blood Updated:  10/25/19 1820     WBC 4.80 10*3/mm3      RBC 4.88 10*6/mm3      Hemoglobin 12.5 g/dL      Hematocrit 38.9 %      MCV 79.7 fL      MCH 25.6 pg      MCHC 32.1 g/dL      RDW 15.1 %      RDW-SD 43.8 fl      MPV 11.1 fL      Platelets 118 10*3/mm3      Neutrophil % 49.2 %      Lymphocyte % 37.5 %      Monocyte % 9.4 %      Eosinophil % 3.1 %      Basophil % 0.4 %      Neutrophils, Absolute 2.36 10*3/mm3      Lymphocytes, Absolute 1.80 10*3/mm3      Monocytes, Absolute 0.45 10*3/mm3      Eosinophils, Absolute 0.15 10*3/mm3      Basophils, Absolute 0.02 10*3/mm3           XR Chest 1 View   Final Result       1.  No definite acute finding.   2.  Blunting of both lateral costophrenic angles, potentially secondary   to trace pleural effusions.   This report was finalized on 10/25/2019 18:17 by Dr. Donaldo Bustos MD.          ED Course  ED Course as of Oct 25 1942   Fri Oct 25, 2019   1940 I told the patient that his testing is all turned out well.  There is no signs of heart failure and he may just have a little fluid retention.  He is not taking any Lasix right now but he has some at home just in case I told him to start taking that daily for the next 3 to 4 days and will give him a dose here.  He is discharged in stable condition.  [TR]      ED Course User Index  [TR] Andriy Bo Jr., MD          MDM  Number of Diagnoses or Management Options  Fluid retention: new and requires workup     Amount and/or Complexity of Data Reviewed  Clinical lab tests: ordered and reviewed  Tests in the radiology section of CPT®: ordered and reviewed  Tests in the medicine section of CPT®: ordered and reviewed    Risk of Complications, Morbidity, and/or Mortality  Presenting problems: moderate  Diagnostic  procedures: moderate  Management options: moderate    Patient Progress  Patient progress: stable      Final diagnoses:   Fluid retention          Andriy Bo Jr., MD  10/25/19 1942

## 2019-10-28 ENCOUNTER — TREATMENT (OUTPATIENT)
Dept: CARDIAC REHAB | Facility: HOSPITAL | Age: 59
End: 2019-10-28

## 2019-10-28 DIAGNOSIS — Z95.1 S/P CABG (CORONARY ARTERY BYPASS GRAFT): Primary | ICD-10-CM

## 2019-10-28 PROCEDURE — 93798 PHYS/QHP OP CAR RHAB W/ECG: CPT

## 2019-10-30 ENCOUNTER — OFFICE VISIT (OUTPATIENT)
Dept: CARDIOLOGY | Facility: CLINIC | Age: 59
End: 2019-10-30

## 2019-10-30 ENCOUNTER — TREATMENT (OUTPATIENT)
Dept: CARDIAC REHAB | Facility: HOSPITAL | Age: 59
End: 2019-10-30

## 2019-10-30 VITALS
HEIGHT: 72 IN | WEIGHT: 250 LBS | DIASTOLIC BLOOD PRESSURE: 80 MMHG | SYSTOLIC BLOOD PRESSURE: 126 MMHG | OXYGEN SATURATION: 98 % | BODY MASS INDEX: 33.86 KG/M2 | HEART RATE: 92 BPM

## 2019-10-30 DIAGNOSIS — R06.02 SHORTNESS OF BREATH: Primary | ICD-10-CM

## 2019-10-30 DIAGNOSIS — Z95.1 S/P CABG (CORONARY ARTERY BYPASS GRAFT): Primary | ICD-10-CM

## 2019-10-30 PROCEDURE — 93798 PHYS/QHP OP CAR RHAB W/ECG: CPT

## 2019-10-30 PROCEDURE — 99213 OFFICE O/P EST LOW 20 MIN: CPT | Performed by: INTERNAL MEDICINE

## 2019-11-01 ENCOUNTER — TREATMENT (OUTPATIENT)
Dept: CARDIAC REHAB | Facility: HOSPITAL | Age: 59
End: 2019-11-01

## 2019-11-01 DIAGNOSIS — Z95.1 S/P CABG (CORONARY ARTERY BYPASS GRAFT): Primary | ICD-10-CM

## 2019-11-01 PROCEDURE — 93798 PHYS/QHP OP CAR RHAB W/ECG: CPT

## 2019-11-04 ENCOUNTER — TREATMENT (OUTPATIENT)
Dept: CARDIAC REHAB | Facility: HOSPITAL | Age: 59
End: 2019-11-04

## 2019-11-04 DIAGNOSIS — Z95.1 S/P CABG (CORONARY ARTERY BYPASS GRAFT): Primary | ICD-10-CM

## 2019-11-04 PROCEDURE — 93798 PHYS/QHP OP CAR RHAB W/ECG: CPT

## 2019-11-06 ENCOUNTER — TREATMENT (OUTPATIENT)
Dept: CARDIAC REHAB | Facility: HOSPITAL | Age: 59
End: 2019-11-06

## 2019-11-06 DIAGNOSIS — Z95.1 S/P CABG (CORONARY ARTERY BYPASS GRAFT): Primary | ICD-10-CM

## 2019-11-06 PROCEDURE — 93798 PHYS/QHP OP CAR RHAB W/ECG: CPT

## 2019-11-08 ENCOUNTER — TREATMENT (OUTPATIENT)
Dept: CARDIAC REHAB | Facility: HOSPITAL | Age: 59
End: 2019-11-08

## 2019-11-08 DIAGNOSIS — Z95.1 S/P CABG (CORONARY ARTERY BYPASS GRAFT): Primary | ICD-10-CM

## 2019-11-08 PROCEDURE — 93798 PHYS/QHP OP CAR RHAB W/ECG: CPT

## 2019-11-11 ENCOUNTER — TREATMENT (OUTPATIENT)
Dept: CARDIAC REHAB | Facility: HOSPITAL | Age: 59
End: 2019-11-11

## 2019-11-11 DIAGNOSIS — Z95.1 S/P CABG (CORONARY ARTERY BYPASS GRAFT): Primary | ICD-10-CM

## 2019-11-11 PROCEDURE — 93798 PHYS/QHP OP CAR RHAB W/ECG: CPT

## 2019-11-13 ENCOUNTER — TREATMENT (OUTPATIENT)
Dept: CARDIAC REHAB | Facility: HOSPITAL | Age: 59
End: 2019-11-13

## 2019-11-13 DIAGNOSIS — Z95.1 S/P CABG (CORONARY ARTERY BYPASS GRAFT): Primary | ICD-10-CM

## 2019-11-13 PROCEDURE — 93798 PHYS/QHP OP CAR RHAB W/ECG: CPT

## 2019-11-14 ENCOUNTER — OFFICE VISIT (OUTPATIENT)
Dept: NEUROLOGY | Facility: CLINIC | Age: 59
End: 2019-11-14

## 2019-11-14 VITALS
WEIGHT: 251 LBS | SYSTOLIC BLOOD PRESSURE: 130 MMHG | BODY MASS INDEX: 34 KG/M2 | HEART RATE: 92 BPM | DIASTOLIC BLOOD PRESSURE: 80 MMHG | HEIGHT: 72 IN | OXYGEN SATURATION: 97 %

## 2019-11-14 DIAGNOSIS — I69.30 CHRONIC LEFT ARTERIAL ISCHEMIC STROKE, ICA (INTERNAL CAROTID ARTERY): Primary | ICD-10-CM

## 2019-11-14 DIAGNOSIS — E11.42 DIABETIC PERIPHERAL NEUROPATHY (HCC): ICD-10-CM

## 2019-11-14 DIAGNOSIS — G40.909 SEIZURE DISORDER (HCC): ICD-10-CM

## 2019-11-14 DIAGNOSIS — Z79.899 LONG-TERM USE OF HIGH-RISK MEDICATION: ICD-10-CM

## 2019-11-14 PROCEDURE — 99213 OFFICE O/P EST LOW 20 MIN: CPT | Performed by: PHYSICIAN ASSISTANT

## 2019-11-14 NOTE — PROGRESS NOTES
Neurology Progress Note      Chief Complaint:    Seizure disorder    Subjective     Subjective:  This is a very pleasant 59-year-old male seen today in follow-up for seizure disorder.  The patient states he has been doing well since undergoing three-vessel coronary artery bypass grafting on 7/6/2019 by Dr. Tang.    Since that time, the patient states he has had no seizure activity.  He is undergoing cardiac rehab.  He has increasing his physical activity.  He has been compliant with his antiepileptic drugs and states he has had no other focal neurologic complaints.  Overall, he feels as though he is doing well.    Additionally, the patient has a history of remote CVA with known left carotid artery disease, diabetes mellitus and dyslipidemia.  He also has a history of obstructive sleep apnea treated with CPAP.    Past Medical History:   Diagnosis Date   • Arthritis    • Asthma    • Carotid disease, bilateral (CMS/HCC)    • Chest pain    • Chronic ischemic heart disease    • Chronic sinusitis    • Maribell bullosa    • Coronary artery disease    • Deviated septum    • Diabetes mellitus (CMS/HCC)    • Difficulty urinating    • Diverticulitis    • Enlarged prostate    • Fatty liver    • GERD (gastroesophageal reflux disease)    • Hyperlipidemia    • Hypertension    • Hypertrophy of nasal turbinates    • Keratoderma    • Kidney stone    • Migraine    • Murmur, heart    • Myocardial infarction (CMS/HCC)    • Obesity    • PONV (postoperative nausea and vomiting)    • Psoriasis    • Seizures (CMS/HCC)    • Sinus congestion    • Sleep apnea    • SOB (shortness of breath)    • Stroke (CMS/HCC)    • UTI (urinary tract infection)      Past Surgical History:   Procedure Laterality Date   • CARDIAC CATHETERIZATION  01/2016    Dr. Broadbent; widely patent previously placed stents in the left anterior descending and obstructive disease involving the diagonal branch which was treated medically   • CARDIAC CATHETERIZATION N/A  7/14/2017    Procedure: Left Heart Cath;  Surgeon: Wade Ramey MD;  Location: [facility] CATH INVASIVE LOCATION;  Service:    • CARDIAC CATHETERIZATION Left 10/15/2018    Procedure: Cardiac Catheterization/Vascular Study;  Surgeon: Wade Raemy MD;  Location:  [facility] CATH INVASIVE LOCATION;  Service: Cardiology   • CARDIAC CATHETERIZATION  10/15/2018    Procedure: Functional Flow [place];  Surgeon: Wade Ramey MD;  Location:  [facility] CATH INVASIVE LOCATION;  Service: Cardiology   • CARDIAC CATHETERIZATION N/A 10/15/2018    Procedure: Left ventriculography;  Surgeon: Wade Ramey MD;  Location:  [facility] CATH INVASIVE LOCATION;  Service: Cardiology   • CARDIAC CATHETERIZATION Left 6/26/2019    Procedure: Cardiac Catheterization/Vascular Study VEL OK  HE WILL WAIT 1 YEAR FOR SHOULDER SURGERY ;  Surgeon: Wade Ramey MD;  Location: [facility] CATH INVASIVE LOCATION;  Service: Cardiology   • CHOLECYSTECTOMY     • CHOLECYSTECTOMY WITH INTRAOPERATIVE CHOLANGIOGRAM N/A 8/1/2018    Procedure: CHOLECYSTECTOMY LAPAROSCOPIC INTRAOPERATIVE CHOLANGIOGRAM;  Surgeon: Shane Ann MD;  Location: [facility] OR;  Service: General   • CORONARY ANGIOPLASTY     • CORONARY ARTERY BYPASS GRAFT N/A 7/6/2019    Procedure: CABG X2 WITH LIMA, LEFT LEG OVH, AND PLACEMENT OF LEFT FEMORAL ARTERIAL LINE;  Surgeon: Steven Tang MD;  Location: [facility] OR;  Service: Cardiothoracic   • CORONARY STENT PLACEMENT      x 6   • ENDOSCOPIC FUNCTIONAL SINUS SURGERY (FESS) Bilateral 12/13/2017    Procedure: PROCEDURE PERFORMED:  Bilateral functional endoscopic anterior ethmoidectomy with bilateral middle meatal antrostomy Septoplasty Right kathia bullosa resection Bilateral inferior turbinate reduction via Coblation;  Surgeon: Mayank Ibarra MD;  Location: [facility] OR;  Service:    • ENDOSCOPY N/A 7/30/2018    Procedure: ESOPHAGOGASTRODUODENOSCOPY WITH ANESTHESIA;  Surgeon: Benitez Mas MD;  Location: [facility] ENDOSCOPY;  Service: Gastroenterology   •  HERNIA REPAIR      x2 inguinal area   • KIDNEY STONE SURGERY     • KNEE SURGERY Right    • OTHER SURGICAL HISTORY      urolift   • PROSTATE SURGERY      Dr. Badillo - 2017   • THUMB AMPUTATION Left     partial   • TOE AMPUTATION Right     big     Family History   Problem Relation Age of Onset   • Heart disease Father    • COPD Mother    • Hypertension Mother    • Asthma Mother    • No Known Problems Sister    • Prostate cancer Maternal Grandfather    • No Known Problems Sister      Social History     Tobacco Use   • Smoking status: Former Smoker     Packs/day: 1.50     Years: 4.50     Pack years: 6.75     Types: Cigarettes, Cigars     Last attempt to quit:      Years since quittin.8   • Smokeless tobacco: Former User     Types: Chew     Quit date:    Substance Use Topics   • Alcohol use: No   • Drug use: No       Medications:  Current Outpatient Medications   Medication Sig Dispense Refill   • albuterol (PROVENTIL HFA;VENTOLIN HFA) 108 (90 BASE) MCG/ACT inhaler Inhale 2 puffs Every 6 (Six) Hours As Needed for wheezing.     • apixaban (ELIQUIS) 5 MG tablet tablet Take 1 tablet by mouth Every 12 (Twelve) Hours. 60 tablet 5   • aspirin 81 MG chewable tablet Chew 81 mg Daily.     • calcium polycarbophil (FIBERCON) 625 MG tablet Take 625 mg by mouth Daily.     • carboxymethylcellulose (REFRESH PLUS) 0.5 % solution Administer 1 drop to both eyes 4 (Four) Times a Day As Needed for Dry Eyes.     • Empagliflozin (JARDIANCE) 10 MG tablet Take 1 tablet by mouth Daily.     • fluticasone (FLONASE) 50 MCG/ACT nasal spray 2 sprays into each nostril Daily.     • furosemide (LASIX) 40 MG tablet TAKE 1 TABLET BY MOUTH ONCE DAILY AS NEEDED FOR WEIGHT GAIN GREATER THAN 2 LBS IN A DAY OR INCREASING CHEST CONGESTION  0   • insulin aspart (novoLOG FLEXPEN) 100 UNIT/ML solution pen-injector sc pen Inject 30 Units under the skin into the appropriate area as directed Every Morning. 34 units  at breakfast, 30 units at lunch, 34  units hs     • Insulin Glargine (LANTUS SOLOSTAR) 100 UNIT/ML injection pen Inject 60-90 Units under the skin into the appropriate area as directed 2 (Two) Times a Day. 65 units QAM AND 70 units QPM     • lamoTRIgine (LaMICtal) 200 MG tablet Take 1 tablet by mouth 2 (Two) Times a Day. 180 tablet 1   • levETIRAcetam (KEPPRA) 500 MG tablet Take 3 tablets every morning, take 4 tablets every night. (Patient taking differently: 500 mg Take As Directed. Take 3 tablets every morning, take 4 tablets every night.) 630 tablet 1   • Liraglutide (VICTOZA SC) Inject 1.2 mg under the skin into the appropriate area as directed Every Night. 1.2     • metFORMIN (GLUCOPHAGE) 1000 MG tablet Take 1 tablet by mouth 2 (Two) Times a Day With Meals. HOLD x 48 hours post contrast. May resume 3/18/18     • Multiple Vitamin (MULTI VITAMIN PO) Take 1 tablet by mouth Daily.     • mupirocin (BACTROBAN) 2 % ointment Apply 1 application topically to the appropriate area as directed 3 (Three) Times a Day.     • nitroglycerin (NITROSTAT) 0.4 MG SL tablet Place 0.4 mg under the tongue Every 5 (Five) Minutes As Needed for chest pain. Take no more than 3 doses in 15 minutes.     • ondansetron ODT (ZOFRAN-ODT) 4 MG disintegrating tablet Take 1 tablet by mouth Every 4 (Four) Hours As Needed for Nausea or Vomiting. 10 tablet 0   • pantoprazole (PROTONIX) 40 MG EC tablet Take 40 mg by mouth 2 (Two) Times a Day.     • psyllium (METAMUCIL) 58.6 % packet Take 1 packet by mouth Daily As Needed (constipation).     • urea (CARMOL) 40 % ointment Apply  topically to the appropriate area as directed As Needed for Dry Skin.       No current facility-administered medications for this visit.        Allergies:    Flagyl [metronidazole]; Atorvastatin; and Ciprofloxacin    Review of Systems:   -A 14 point review of systems is completed and is negative.      Objective      Vital Signs  Heart Rate:  [92] 92  BP: (130)/(80) 130/80    Physical Exam:    General Exam:  Head:   Normocephalic, atraumatic.  HEENT: PERRLA.  Full EOM.  Neck:  No lymphadenopathy, thyromegaly or bruit.  Cardiac:  Regular rate and rhythm.  Normal S1, S2.  No murmur, rub or gallop.  Lungs:  Clear to auscultation bilaterally.  No wheeze, rales or rhonchi.  Abdomen:  Non-tender, Non-distended.  Bowel sounds normoactive.  Extremities: Full peripheral pulses.  No clubbing, cyanosis or edema.  Skin: No ulceration, breakdown or rash.      Neurologic Exam:  Mental Status:    -Awake. Alert. Oriented to person, place & time.  -No word finding difficulties.  -No aphasia.  -No dysarthria.  -Follows simple commands.     CN II:  Full visual fields with confrontation.  Pupils equally reactive to light.  CN III, IV, VI:  Extraocular muscles function intact with no nystagmus.  CN V:  Facial sensory is symmetric.  CN VII:  Facial motor symmetric.  CN VIII:  Gross hearing intact bilaterally.  CN IX/X:  Palate elevates symmetrically.  CN XI:  Shoulder shrug symmetric.  CN XII:  Tongue is midline on protrusion.     Motor: (strength out of 5:  1= minimal movement, 2 = movement in plane of gravity, 3 = movement against gravity, 4 = movement against some resistance, 5 = full strength)     -5/5 in bilateral biceps, triceps, brachioradialis, wrist extensors and intrinsic muscles of the hand.    -5/5 in bilateral hip flexors, quadriceps, hamstrings, gastrocsoleus complex, anterior tibialis and extensor hallucis longus.       Deep Tendon Reflexes:  -Right              Bicep: 2+         Triceps: 2+      Brachioradialis: 2+              Patella: 2+       Ankle: 2+         Babinski:  negative  -Left              Bicep: 2+         Triceps: 2+      Brachioradialis: 2+              Patella: 2+       Ankle: 2+         Babinski:  negative     Sensory:  -Intact to light touch, pinprick BUE (C5-T1) and BLE (L2-S1).     Coordination:  -Finger to nose intact BUEs  -Heel to shin intact BLEs  -No ataxia     Gait  -No signs of ataxia  -ambulates  "unassisted       Results Review:    I reviewed the patient's new clinical results and findings.      No components found for: A1C  Lab Results   Component Value Date    HDL 36 (L) 09/07/2019    LDL 74 09/07/2019     No components found for: B12  Lab Results   Component Value Date    TSH 1.700 10/25/2019       Assessment/Plan     Impression:  1.  Seizure disorder  2.  Obstructive sleep apnea treated with CPAP  3.  Coronary artery disease status post recent three-vessel coronary artery bypass grafting, July 2019  4.  Diabetes mellitus  5.  Hypertension, essential  6.  Dyslipidemia, mixed      Plan:  1.  Continue Keppra 1500 mg in the morning and 2000 g at night  2.  Continue Lamictal 2 mg twice daily  3.  Continue target LDL less than 70 for secondary stroke prophylaxis.  4.  Continue aspirin 81 mg daily.  5.  Recommend annual surveillance for seizure disorder.  6.  Keppra level and Lamictal level.  7.  Seizure precautions were discussed to include no tub baths, no swimming, avoiding lack of sleep, and avoiding known triggers. Education given of things that may contribute to a seizure to include, but not limited to: stressful situations, fever, fatigue, lack of sleep, low blood sugar, hyperventilation, flashing lights, and caffeine. Instructions given to take seizure medications as prescribed. Education given to family member on what to do during a seizure and care following the seizure. Education given to contact this office prior to stopping or changing any medications.  8.  Patient is counseled on stroke signs and symptoms using FAST and \"Time Saved is Brain Saved.\"    The patient voices understanding and agreement with plan of care and will call for concerns or questions in the interim.  We reviewed pertinent diagnostic studies and the potential risks and benefits of the prescribed regimen of treatment.    We discussed compliance of the prescribed treatment regimen and instructions on medication, therapy, physical " activity, etc. and potential for improvement and impact these have on their healing/recovery and risk reduction in the future.    I spent greater than 20 minutes in direct face to face contact with the patient with greater than 50% of the time being spent in education and counseling.          Monty Monreal PA-C  11/15/19  12:56 PM

## 2019-11-15 ENCOUNTER — TREATMENT (OUTPATIENT)
Dept: CARDIAC REHAB | Facility: HOSPITAL | Age: 59
End: 2019-11-15

## 2019-11-15 DIAGNOSIS — Z95.1 S/P CABG (CORONARY ARTERY BYPASS GRAFT): Primary | ICD-10-CM

## 2019-11-15 PROCEDURE — 93798 PHYS/QHP OP CAR RHAB W/ECG: CPT

## 2019-11-18 ENCOUNTER — TREATMENT (OUTPATIENT)
Dept: CARDIAC REHAB | Facility: HOSPITAL | Age: 59
End: 2019-11-18

## 2019-11-18 DIAGNOSIS — Z95.1 S/P CABG (CORONARY ARTERY BYPASS GRAFT): Primary | ICD-10-CM

## 2019-11-18 PROCEDURE — 93798 PHYS/QHP OP CAR RHAB W/ECG: CPT

## 2019-11-20 ENCOUNTER — TREATMENT (OUTPATIENT)
Dept: CARDIAC REHAB | Facility: HOSPITAL | Age: 59
End: 2019-11-20

## 2019-11-20 DIAGNOSIS — Z95.1 S/P CABG (CORONARY ARTERY BYPASS GRAFT): Primary | ICD-10-CM

## 2019-11-20 PROCEDURE — 93798 PHYS/QHP OP CAR RHAB W/ECG: CPT

## 2019-11-22 ENCOUNTER — TREATMENT (OUTPATIENT)
Dept: CARDIAC REHAB | Facility: HOSPITAL | Age: 59
End: 2019-11-22

## 2019-11-22 DIAGNOSIS — Z95.1 S/P CABG (CORONARY ARTERY BYPASS GRAFT): Primary | ICD-10-CM

## 2019-11-22 PROCEDURE — 93798 PHYS/QHP OP CAR RHAB W/ECG: CPT

## 2019-11-25 ENCOUNTER — TREATMENT (OUTPATIENT)
Dept: CARDIAC REHAB | Facility: HOSPITAL | Age: 59
End: 2019-11-25

## 2019-11-25 DIAGNOSIS — Z95.1 S/P CABG (CORONARY ARTERY BYPASS GRAFT): Primary | ICD-10-CM

## 2019-11-25 PROCEDURE — 93798 PHYS/QHP OP CAR RHAB W/ECG: CPT

## 2019-11-27 ENCOUNTER — TREATMENT (OUTPATIENT)
Dept: CARDIAC REHAB | Facility: HOSPITAL | Age: 59
End: 2019-11-27

## 2019-11-27 DIAGNOSIS — Z95.1 S/P CABG (CORONARY ARTERY BYPASS GRAFT): Primary | ICD-10-CM

## 2019-11-27 PROCEDURE — 93798 PHYS/QHP OP CAR RHAB W/ECG: CPT

## 2019-12-02 ENCOUNTER — TREATMENT (OUTPATIENT)
Dept: CARDIAC REHAB | Facility: HOSPITAL | Age: 59
End: 2019-12-02

## 2019-12-02 DIAGNOSIS — Z95.1 S/P CABG (CORONARY ARTERY BYPASS GRAFT): Primary | ICD-10-CM

## 2019-12-02 PROCEDURE — 93798 PHYS/QHP OP CAR RHAB W/ECG: CPT

## 2019-12-03 ENCOUNTER — APPOINTMENT (OUTPATIENT)
Dept: CARDIAC REHAB | Facility: HOSPITAL | Age: 59
End: 2019-12-03

## 2019-12-10 ENCOUNTER — TELEPHONE (OUTPATIENT)
Dept: CARDIAC SURGERY | Facility: CLINIC | Age: 59
End: 2019-12-10

## 2019-12-10 NOTE — TELEPHONE ENCOUNTER
Pt calling re: Lisinopril.  States Dr Tang stopped this med after his surgery.  Pt reports his BP is starting to go up again.  Asked pt if he had cardiologist and pt states he sees Dr Ramey.  Tried to explain to pt he should call Dr Ramey re: this but pt insisted on a message being sent to Dr Tang since he was the one who d/c'd med.  Pt states if Dr Tang wants him to restart this med, [facility]  Can reach pt at #[phone]/kah

## 2019-12-11 ENCOUNTER — TELEPHONE (OUTPATIENT)
Dept: CARDIOLOGY | Facility: CLINIC | Age: 59
End: 2019-12-11

## 2019-12-11 NOTE — TELEPHONE ENCOUNTER
Patient called the office stating that his BP has been elevated since being taken off Lisinopril after open heart surgery.  It runs 145-150's/88-90's.  He wants to know if you want to put him back on Lisinopril?  Please advise.

## 2019-12-11 NOTE — TELEPHONE ENCOUNTER
He will need to speak with Dr. Ramey regarding this, as he will follow blood pressure and medications from here on out.

## 2019-12-12 RX ORDER — LISINOPRIL 20 MG/1
20 TABLET ORAL DAILY
Qty: 90 TABLET | Refills: 3 | Status: SHIPPED | OUTPATIENT
Start: 2019-12-12 | End: 2019-12-12 | Stop reason: SDUPTHER

## 2019-12-12 RX ORDER — LISINOPRIL 20 MG/1
20 TABLET ORAL DAILY
Qty: 90 TABLET | Refills: 3 | Status: SHIPPED | OUTPATIENT
Start: 2019-12-12 | End: 2020-01-15 | Stop reason: SDUPTHER

## 2019-12-12 NOTE — TELEPHONE ENCOUNTER
Spoke with patient.  He expressed understanding.  He will need a refill.  Sending request to Dr. Ramey.

## 2019-12-13 ENCOUNTER — LAB (OUTPATIENT)
Dept: LAB | Facility: HOSPITAL | Age: 59
End: 2019-12-13

## 2019-12-13 DIAGNOSIS — G40.909 SEIZURE DISORDER (HCC): ICD-10-CM

## 2019-12-13 DIAGNOSIS — Z79.899 LONG-TERM USE OF HIGH-RISK MEDICATION: ICD-10-CM

## 2019-12-13 PROCEDURE — 80177 DRUG SCRN QUAN LEVETIRACETAM: CPT | Performed by: PHYSICIAN ASSISTANT

## 2019-12-13 PROCEDURE — 80175 DRUG SCREEN QUAN LAMOTRIGINE: CPT | Performed by: PHYSICIAN ASSISTANT

## 2019-12-13 PROCEDURE — 36415 COLL VENOUS BLD VENIPUNCTURE: CPT

## 2019-12-15 LAB — LEVETIRACETAM SERPL-MCNC: 11.2 UG/ML (ref 10–40)

## 2019-12-16 LAB — LAMOTRIGINE SERPL-MCNC: 2.5 UG/ML (ref 2–20)

## 2019-12-23 ENCOUNTER — TELEPHONE (OUTPATIENT)
Dept: CARDIOLOGY | Facility: CLINIC | Age: 59
End: 2019-12-23

## 2019-12-23 NOTE — TELEPHONE ENCOUNTER
Azucena with [facility] called requesting a surgical clearance for this patient.  She did not leave any additional information.  Please call back when available.

## 2020-01-02 ENCOUNTER — TELEPHONE (OUTPATIENT)
Dept: CARDIOLOGY | Facility: CLINIC | Age: 60
End: 2020-01-02

## 2020-01-15 RX ORDER — LISINOPRIL 20 MG/1
20 TABLET ORAL DAILY
Qty: 90 TABLET | Refills: 3 | Status: ON HOLD | OUTPATIENT
Start: 2020-01-15 | End: 2021-04-30 | Stop reason: SDUPTHER

## 2020-02-10 ENCOUNTER — OFFICE VISIT (OUTPATIENT)
Dept: CARDIOLOGY | Facility: CLINIC | Age: 60
End: 2020-02-10

## 2020-02-10 VITALS
OXYGEN SATURATION: 98 % | WEIGHT: 258 LBS | BODY MASS INDEX: 34.95 KG/M2 | DIASTOLIC BLOOD PRESSURE: 88 MMHG | SYSTOLIC BLOOD PRESSURE: 130 MMHG | HEART RATE: 81 BPM | HEIGHT: 72 IN

## 2020-02-10 DIAGNOSIS — I65.22 STENOSIS OF LEFT CAROTID ARTERY: ICD-10-CM

## 2020-02-10 DIAGNOSIS — G40.909 SEIZURE DISORDER (HCC): ICD-10-CM

## 2020-02-10 DIAGNOSIS — I25.119 CORONARY ARTERY DISEASE INVOLVING NATIVE CORONARY ARTERY OF NATIVE HEART WITH ANGINA PECTORIS (HCC): ICD-10-CM

## 2020-02-10 DIAGNOSIS — D69.1 ASPIRIN-LIKE PLATELET FUNCTION DEFECT (HCC): ICD-10-CM

## 2020-02-10 DIAGNOSIS — I77.9 BILATERAL CAROTID ARTERY DISEASE, UNSPECIFIED TYPE (HCC): ICD-10-CM

## 2020-02-10 DIAGNOSIS — Z79.4 TYPE 2 DIABETES MELLITUS WITHOUT COMPLICATION, WITH LONG-TERM CURRENT USE OF INSULIN (HCC): Chronic | ICD-10-CM

## 2020-02-10 DIAGNOSIS — R01.1 CARDIAC MURMUR: ICD-10-CM

## 2020-02-10 DIAGNOSIS — I71.20 THORACIC AORTIC ANEURYSM WITHOUT RUPTURE (HCC): ICD-10-CM

## 2020-02-10 DIAGNOSIS — I69.30 CHRONIC LEFT ARTERIAL ISCHEMIC STROKE, ICA (INTERNAL CAROTID ARTERY): ICD-10-CM

## 2020-02-10 DIAGNOSIS — G47.33 OSA ON CPAP: Primary | ICD-10-CM

## 2020-02-10 DIAGNOSIS — Z99.89 OSA ON CPAP: Primary | ICD-10-CM

## 2020-02-10 DIAGNOSIS — R07.2 PRECORDIAL CHEST PAIN: ICD-10-CM

## 2020-02-10 DIAGNOSIS — I48.0 PAROXYSMAL ATRIAL FIBRILLATION (HCC): ICD-10-CM

## 2020-02-10 DIAGNOSIS — E11.9 TYPE 2 DIABETES MELLITUS WITHOUT COMPLICATION, WITH LONG-TERM CURRENT USE OF INSULIN (HCC): Chronic | ICD-10-CM

## 2020-02-10 DIAGNOSIS — R94.39 ABNORMAL STRESS TEST: ICD-10-CM

## 2020-02-10 DIAGNOSIS — I50.31 ACUTE DIASTOLIC CONGESTIVE HEART FAILURE (HCC): ICD-10-CM

## 2020-02-10 DIAGNOSIS — I48.92 ATRIAL FLUTTER, UNSPECIFIED TYPE (HCC): ICD-10-CM

## 2020-02-10 DIAGNOSIS — Z98.890 S/P FESS (FUNCTIONAL ENDOSCOPIC SINUS SURGERY): ICD-10-CM

## 2020-02-10 DIAGNOSIS — E78.2 MIXED HYPERLIPIDEMIA: ICD-10-CM

## 2020-02-10 DIAGNOSIS — E87.70 HYPERVOLEMIA, UNSPECIFIED HYPERVOLEMIA TYPE: ICD-10-CM

## 2020-02-10 DIAGNOSIS — Z87.898 HISTORY OF SEIZURE: ICD-10-CM

## 2020-02-10 PROCEDURE — 99214 OFFICE O/P EST MOD 30 MIN: CPT | Performed by: INTERNAL MEDICINE

## 2020-02-10 PROCEDURE — 93000 ELECTROCARDIOGRAM COMPLETE: CPT | Performed by: INTERNAL MEDICINE

## 2020-02-10 NOTE — PROGRESS NOTES
Carlos A Boyer Jr.  [id number]  1960  59 y.o.  male    Referring Provider: Vinayak Correia PA    Reason for Follow-up Visit: Here for routine follow up   Prior ER visit for Paroxysmal atrial fibrillation with with rapid ventricular response    CARLOS guided cardioversion, on anticoagulation with Eliquis  coronary artery disease stented coronary artery   Type 2 diabetes mellitus   S/p CABG   Wants to start exercise program    preoperative cardiovascular clearance under general anesthesia for right rotator cuff Dr Divina Michelle [facility]       Subjective      Gets chest pain in cold air   Pressure like   No radiation   Lasts for 5-10 mins   No associated nausea or sweating     Resolves with coming to warm environment and resolves within 10 mins     Prior ER visit for  shortness of breath that is better after PO diuretics    No palpitations  No significant pedal edema    Compliant with medications and dietary advice  Fair effort tolerance  Tolerating current medications well with no untoward side effects     No presyncope or syncope  Compliant with medications    Completed cardiac rehab    BP elevated at home   Blood sugars well controlled at home    History of present illness:  Carlos A Boyer Jr. is a 59 y.o. yo male with history of chest pain who presents today for   Chief Complaint   Patient presents with   • Atrial Fibrillation     3 mo follow up   .    History  Past Medical History:   Diagnosis Date   • Arthritis    • Asthma    • Carotid disease, bilateral (CMS/HCC)    • Chest pain    • Chronic ischemic heart disease    • Chronic sinusitis    • Maribell bullosa    • Coronary artery disease    • Deviated septum    • Diabetes mellitus (CMS/HCC)    • Difficulty urinating    • Diverticulitis    • Enlarged prostate    • Fatty liver    • GERD (gastroesophageal reflux disease)    • Hyperlipidemia    • Hypertension    • Hypertrophy of nasal turbinates    • Keratoderma    • Kidney stone    • Migraine    • Murmur, heart     • Myocardial infarction (CMS/HCC)    • Obesity    • PONV (postoperative nausea and vomiting)    • Psoriasis    • Seizures (CMS/HCC)    • Sinus congestion    • Sleep apnea    • SOB (shortness of breath)    • Stroke (CMS/HCC)    • UTI (urinary tract infection)    ,   Past Surgical History:   Procedure Laterality Date   • CARDIAC CATHETERIZATION  01/2016    Dr. Broadbent; widely patent previously placed stents in the left anterior descending and obstructive disease involving the diagonal branch which was treated medically   • CARDIAC CATHETERIZATION N/A 7/14/2017    Procedure: Left Heart Cath;  Surgeon: Wade Ramey MD;  Location:  [facility] CATH [facility]ASIVE [facility];  Service:    • CARDIAC CATHETERIZATION Left 10/15/2018    Procedure: Cardiac Catheterization/Vascular Study;  Surgeon: Wade Ramey MD;  Location:  [facility];  Service: Cardiology   • CARDIAC CATHETERIZATION  10/15/2018    Procedure: Functional Flow [place];  Surgeon: Wade Ramey MD;  Location:  [facility];  Service: Cardiology   • CARDIAC CATHETERIZATION N/A 10/15/2018    Procedure: Left ventriculography;  Surgeon: Wade Ramey MD;  Location:  [facility];  Service: Cardiology   • CARDIAC CATHETERIZATION Left 6/26/2019    Procedure: Cardiac Catheterization/Vascular Study VEL OK  HE WILL WAIT 1 YEAR FOR SHOULDER SURGERY ;  Surgeon: Wade Ramey MD;  Location:  [facility];  Service: Cardiology   • CHOLECYSTECTOMY     • CHOLECYSTECTOMY WITH INTRAOPERATIVE CHOLANGIOGRAM N/A 8/1/2018    Procedure: CHOLECYSTECTOMY LAPAROSCOPIC INTRAOPERATIVE CHOLANGIOGRAM;  Surgeon: Shane Ann MD;  Location:  [facility] OR;  Service: General   • CORONARY ANGIOPLASTY     • CORONARY ARTERY BYPASS GRAFT N/A 7/6/2019    Procedure: CABG X2 WITH LIMA, LEFT LEG OVH, AND PLACEMENT OF LEFT FEMORAL ARTERIAL LINE;  Surgeon: Steven Tang MD;  Location:  [facility] OR;  Service: Cardiothoracic   • CORONARY STENT  PLACEMENT      x 6   • ENDOSCOPIC FUNCTIONAL SINUS SURGERY (FESS) Bilateral 2017    Procedure: PROCEDURE PERFORMED:  Bilateral functional endoscopic anterior ethmoidectomy with bilateral middle meatal antrostomy Septoplasty Right kathia bullosa resection Bilateral inferior turbinate reduction via Coblation;  Surgeon: Mayank Ibarra MD;  Location: [facility] OR;  Service:    • ENDOSCOPY N/A 2018    Procedure: ESOPHAGOGASTRODUODENOSCOPY WITH ANESTHESIA;  Surgeon: Benitez Mas MD;  Location: [facility] ENDOSCOPY;  Service: Gastroenterology   • HERNIA REPAIR      x2 inguinal area   • KIDNEY STONE SURGERY     • KNEE SURGERY Right    • OTHER SURGICAL HISTORY      urolift   • PROSTATE SURGERY      Dr. Badillo -    • THUMB AMPUTATION Left     partial   • TOE AMPUTATION Right     big   ,   Family History   Problem Relation Age of Onset   • Heart disease Father    • COPD Mother    • Hypertension Mother    • Asthma Mother    • No Known Problems Sister    • Prostate cancer Maternal Grandfather    • No Known Problems Sister    ,   Social History     Tobacco Use   • Smoking status: Former Smoker     Packs/day: 1.50     Years: 4.50     Pack years: 6.75     Types: Cigarettes, Cigars     Last attempt to quit:      Years since quittin.1   • Smokeless tobacco: Former User     Types: Chew     Quit date:    Substance Use Topics   • Alcohol use: No   • Drug use: No   ,     Medications  Current Outpatient Medications   Medication Sig Dispense Refill   • albuterol (PROVENTIL HFA;VENTOLIN HFA) 108 (90 BASE) MCG/ACT inhaler Inhale 2 puffs Every 6 (Six) Hours As Needed for wheezing.     • apixaban (ELIQUIS) 5 MG tablet tablet Take 1 tablet by mouth Every 12 (Twelve) Hours. 60 tablet 5   • aspirin 81 MG chewable tablet Chew 81 mg Daily.     • calcium polycarbophil (FIBERCON) 625 MG tablet Take 625 mg by mouth Daily.     • carboxymethylcellulose (REFRESH PLUS) 0.5 % solution Administer 1 drop to both eyes 4 (Four)  Times a Day As Needed for Dry Eyes.     • Empagliflozin (JARDIANCE) 10 MG tablet Take 1 tablet by mouth Daily.     • fluticasone (FLONASE) 50 MCG/ACT nasal spray 2 sprays into each nostril Daily.     • furosemide (LASIX) 40 MG tablet TAKE 1 TABLET BY MOUTH ONCE DAILY AS NEEDED FOR WEIGHT GAIN GREATER THAN 2 LBS IN A DAY OR INCREASING CHEST CONGESTION  0   • insulin aspart (novoLOG FLEXPEN) 100 UNIT/ML solution pen-injector sc pen Inject 30 Units under the skin into the appropriate area as directed Every Morning. 34 units  at breakfast, 30 units at lunch, 34 units hs     • Insulin Glargine (LANTUS SOLOSTAR) 100 UNIT/ML injection pen Inject 60-90 Units under the skin into the appropriate area as directed 2 (Two) Times a Day. 65 units QAM AND 70 units QPM     • lamoTRIgine (LaMICtal) 200 MG tablet Take 1 tablet by mouth 2 (Two) Times a Day. 180 tablet 1   • levETIRAcetam (KEPPRA) 500 MG tablet Take 3 tablets every morning, take 4 tablets every night. (Patient taking differently: 500 mg Take As Directed. Take 3 tablets every morning, take 4 tablets every night.) 630 tablet 1   • Liraglutide (VICTOZA SC) Inject 1.2 mg under the skin into the appropriate area as directed Every Night. 1.2     • lisinopril (PRINIVIL,ZESTRIL) 20 MG tablet Take 1 tablet by mouth Daily. 90 tablet 3   • metFORMIN (GLUCOPHAGE) 1000 MG tablet Take 1 tablet by mouth 2 (Two) Times a Day With Meals. HOLD x 48 hours post contrast. May resume 3/18/18     • Multiple Vitamin (MULTI VITAMIN PO) Take 1 tablet by mouth Daily.     • mupirocin (BACTROBAN) 2 % ointment Apply 1 application topically to the appropriate area as directed 3 (Three) Times a Day.     • nitroglycerin (NITROSTAT) 0.4 MG SL tablet Place 0.4 mg under the tongue Every 5 (Five) Minutes As Needed for chest pain. Take no more than 3 doses in 15 minutes.     • ondansetron ODT (ZOFRAN-ODT) 4 MG disintegrating tablet Take 1 tablet by mouth Every 4 (Four) Hours As Needed for Nausea or Vomiting.  "10 tablet 0   • pantoprazole (PROTONIX) 40 MG EC tablet Take 40 mg by mouth 2 (Two) Times a Day.     • psyllium (METAMUCIL) 58.6 % packet Take 1 packet by mouth Daily As Needed (constipation).     • urea (CARMOL) 40 % ointment Apply  topically to the appropriate area as directed As Needed for Dry Skin.       No current facility-administered medications for this visit.        Allergies:  Flagyl [metronidazole]; Atorvastatin; and Ciprofloxacin    Review of Systems  Review of Systems   Constitution: Positive for malaise/fatigue.   HENT: Negative.    Eyes: Negative.    Cardiovascular: Positive for chest pain and dyspnea on exertion. Negative for claudication, cyanosis, irregular heartbeat, leg swelling, near-syncope, orthopnea, palpitations, paroxysmal nocturnal dyspnea and syncope.   Respiratory: Negative.    Endocrine: Negative.    Hematologic/Lymphatic: Negative.    Skin: Negative.    Musculoskeletal: Positive for arthritis and back pain.   Gastrointestinal: Negative for anorexia.   Genitourinary: Negative.    Neurological: Positive for dizziness, light-headedness and weakness.   Psychiatric/Behavioral: Negative.        Objective     Physical Exam:      Vitals:    02/10/20 0820   BP: 130/88   Pulse: 81   SpO2: 98%   Weight: 117 kg (258 lb)   Height: 182.9 cm (72\")       /88   Pulse 81   Ht 182.9 cm (72\")   Wt 117 kg (258 lb)   SpO2 98%   BMI 34.99 kg/m²   Physical Exam   Constitutional: He appears well-developed.   HENT:   Head: Normocephalic.   Neck: Normal carotid pulses and no JVD present. No tracheal tenderness present. Carotid bruit is not present. No tracheal deviation and no edema present.   Cardiovascular: Regular rhythm, normal heart sounds and normal pulses.   Pulmonary/Chest: Effort normal. No stridor.   Abdominal: Soft. He exhibits no distension. There is no hepatosplenomegaly. There is no tenderness.   Neurological: He is alert. He has normal strength. No cranial nerve deficit or sensory " deficit.   Skin: Skin is warm.   Psychiatric: He has a normal mood and affect. His speech is normal and behavior is normal.     Wounds healing very well. No evidence of excessive bruising, pain, redness, swelling, discharge, foul odor or associated fever or chills.     Results Review:      Conclusion of cardiac catheterization     Left main coronary arteries normal  Left anterior descending coronary artery has patent stents  The distal edge of the stent in the mid left anterior descending coronary artery has approximately 60% stenosis  Highly abnormal fractional flow reserve of this at 0.78 without adenosine stress.  PTCA done of the segment.  Despite multiple attempts and use of guide liner could not advance the stent due to earlier implanted stents.  Left circumflex coronary artery is codominant and large  The proximal portion of the first obtuse marginal branch has a 60% stenosis with normal fractional flow reserve above 0.85.  Right coronary artery is large and codominant without any high-grade lesions.     Left ventricular end-diastolic pressure is mildly elevated to 15 mmHg.  No gradient across aortic valve on pullback.  Left ventriculography shows a hyperdynamic left ventricle with ejection fraction of 75%.     Percutaneous coronary intervention     XB 3.5 guide advised used for fractional flow reserve of the obtuse marginal branch as well as of the left anterior descending coronary artery  Then we did try to advance a 2.5 mm stent.  We used 2 different size stents and could not cross  We used a guide liner and does not did not have placed cross across the stent.  We then did balloon angioplasty using 2.0 mm balloon.  Stenosis was reduced from 60% down to less than 10%.  BEVERLEY-3 flow before and after procedure.           ____________________________________________________________________________________________________________________________________________     Plan after cardiac catheterization        Dual  antiplatelet therapy minimum of 1 year preferably longer  Statin ACE inhibitors beta-blockers  If there is repeat restenosis of the left anterior descending coronary artery would consider surgical revascularization.  Target LDL less than 70 mg/dL.  Maximize antianginal therapy.  Intensive risk factor modifications for both primary and secondary prevention if applicable  Hydration   Observation     Results for orders placed during the hospital encounter of 09/05/19   Adult Transesophageal Echo (CARLOS) W/ Cont if Necessary Per Protocol    Narrative · No evidence of left atrial appendage thrombus - okay to proceed with   cardioversion.  · Left ventricular systolic function is normal.  · Moderate mitral valve regurgitation is present  · Mild aortic insufficiency.  · Normal size and function of the right ventricle.  · Negative bubble study.      10/15/18        Conclusion     The left main coronary artery is normal  Left anterior descending coronary artery and diagonal branches are patent with a widely patent stent noted in the  left anterior descending coronary artery    Mid left anterior descending coronary artery is a 50% stenosis and hemodynamically not significant.  Left circumflex coronary artery is large with a first obtuse marginal around 40-60% stenosis.  Fractional flow reserve of this is completely normal and at above 0.9.  Left circumflex coronary artery is a codominant.  Right coronary artery is codominant large with distal small vessel disease.     Left ventricular end-diastolic pressure is normal at 11 mmHg no gradient across aortic valve on pullback  Left ventriculography shows ejection fraction of 55%.           Plan     Continue current medication  Symptomatic treatment for small vessel disease with antianginals  If stable can be discharged home later today  Intensive risk factor modifications for both primary and secondary prevention if applicable  Hydration   Observation         7/17  cath  Conclusion  Nonocclusive epicardial coronary arteries with widely patent stents in the  left anterior descending coronary artery   artery and left circumflex coronary artery.  Hyperdynamic left ventricle with ejection fraction of 75%.  LVEDP   17    mm Hg     Plan  Workup for noncardiac causes of chest pain this can be done as outpatient.  His d-dimer is within normal range  After a period of observation of stable can be discharged either later today.  Keep follow-up appointment with me  Ongoing risk factor modifications keep LDL below 70 mg/dL  Hydration   Observation       ECG 12 Lead  Date/Time: 2/10/2020 8:52 AM  Performed by: Wade Ramey MD  Authorized by: Wade Ramey MD   Comparison: compared with previous ECG from 10/25/2019  Similar to previous ECG  Rhythm: sinus rhythm  Rate: normal  Conduction: 1st degree AV block  ST Segments: ST segments normal  QRS axis: right  Other findings: poor R wave progression    Clinical impression: abnormal EKG            Assessment/Plan   Patient Active Problem List   Diagnosis   • Type 2 diabetes mellitus without complication, with long-term current use of insulin (CMS/[facility])   • Gastroesophageal reflux disease without esophagitis   • Essential hypertension   • Seizure disorder (CMS/[facility])   • Carotid disease, bilateral (CMS/[facility])   • Coronary artery disease involving native coronary artery of native heart with angina pectoris (CMS/[facility])   • Mixed hyperlipidemia   • Chronic left arterial ischemic stroke, ICA (internal carotid artery)   • HOA on CPAP   • Benign non-nodular prostatic hyperplasia with lower urinary tract symptoms   • Deviated nasal septum   • Maribell bullosa   • Hypertrophy of both inferior nasal turbinates   • Chronic sinusitis of both maxillary sinuses   • S/P FESS (functional endoscopic sinus surgery)   • Diabetic complication (CMS/[facility])   • Epigastric pain   • Diabetic peripheral neuropathy (CMS/[facility])   • Abnormal stress test   • Stenosis of left carotid  artery   • Class 1 obesity in adult   • 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CMS/HCC)   • History of seizure   • Lung nodules   • Cardiac murmur   • Thoracic aortic aneurysm without rupture (CMS/HCC)   • Paroxysmal atrial fibrillation (CMS/HCC)   • Atrial flutter (CMS/HCC) with cardioversion on Sept 5, 2019   • Acute diastolic congestive heart failure (CMS/HCC)   • Fluid overload   • Pleural effusion   • History of hyperlipidemia   • Respiratory distress   • Long-term use of high-risk medication           Plan       The current medical regimen is effective;  continue present plan and medications.   Continue ASA 81 mg daily and Eliquis 5 mg BID    Orders Placed This Encounter   Procedures   • Stress Test With Myocardial Perfusion One Day     Standing Status:   Future     Standing Expiration Date:   2/9/2021     Order Specific Question:   What stress agent will be used?     Answer:   Regadenoson (Lexiscan)     Order Specific Question:   Difficulty walking criteria?     Answer:   Musculoskeletal (hips, knees, feet, back, amputee)     Order Specific Question:   Reason for exam?     Answer:   Chest Pain   • ECG 12 Lead     This order was created via procedure documentation      Call for results of above testing prior to starting heavy exercise program    Keep A1c less than 7 Primary to monitor  Keep LDL below 70 mg/dl. Monitor liver and renal functions.   Monitor CBC, CMP, TSH (as indicated) and Lipid Panel by primary      S/L NTG PRN for chest pain, call me or go to ER if has to use S/L nitroglycerin     Weigh yourself frequently, at least weekly, preferably daily, call me if more than 2 pounds a day or 5 pounds a week weight gain.  Flexible diuretic dosing     ____________________________________________________________________________________________________________________________________________  Health maintenance and recommendations      Similar recommendations as last visit     Offered to give patient  a copy       Questions were encouraged, asked and answered to the patient's  understanding and satisfaction. Questions if any regarding current medications and side effects, need for refills and importance of compliance to medications stressed.    Reviewed available prior notes, consults, prior visits, laboratory findings, radiology and cardiology relevant reports. Updated chart as applicable. I have reviewed the patient's medical history in detail and updated the computerized patient record as relevant.      Updated patient regarding any new or relevant abnormalities on review of records or any new findings on physical exam. Mentioned to patient about purpose of visit and desirable health short and long term goals and objectives.    Primary to monitor CBC CMP Lipid panel and TSH as applicable    ___________________________________________________________________________________________________________________________________________              Return in about 3 months (around 5/10/2020).

## 2020-02-11 ENCOUNTER — HOSPITAL ENCOUNTER (OUTPATIENT)
Dept: CARDIOLOGY | Facility: HOSPITAL | Age: 60
Discharge: HOME | End: 2020-02-11

## 2020-02-11 VITALS
BODY MASS INDEX: 34.94 KG/M2 | HEIGHT: 72 IN | SYSTOLIC BLOOD PRESSURE: 118 MMHG | DIASTOLIC BLOOD PRESSURE: 75 MMHG | HEART RATE: 69 BPM | WEIGHT: 257.94 LBS

## 2020-02-11 DIAGNOSIS — R07.2 PRECORDIAL CHEST PAIN: ICD-10-CM

## 2020-02-11 PROCEDURE — 25010000002 REGADENOSON 0.4 MG/5ML SOLUTION: Performed by: INTERNAL MEDICINE

## 2020-02-11 PROCEDURE — 78452 HT MUSCLE IMAGE SPECT MULT: CPT

## 2020-02-11 PROCEDURE — 93018 CV STRESS TEST I&R ONLY: CPT | Performed by: INTERNAL MEDICINE

## 2020-02-11 PROCEDURE — A9500 TC99M SESTAMIBI: HCPCS | Performed by: INTERNAL MEDICINE

## 2020-02-11 PROCEDURE — 78452 HT MUSCLE IMAGE SPECT MULT: CPT | Performed by: INTERNAL MEDICINE

## 2020-02-11 PROCEDURE — 93017 CV STRESS TEST TRACING ONLY: CPT

## 2020-02-11 PROCEDURE — 0 TECHNETIUM SESTAMIBI: Performed by: INTERNAL MEDICINE

## 2020-02-11 RX ADMIN — TECHNETIUM TC 99M SESTAMIBI 1 DOSE: 1 INJECTION INTRAVENOUS at 09:10

## 2020-02-11 RX ADMIN — REGADENOSON 0.4 MG: 0.08 INJECTION, SOLUTION INTRAVENOUS at 10:45

## 2020-02-11 RX ADMIN — TECHNETIUM TC 99M SESTAMIBI 1 DOSE: 1 INJECTION INTRAVENOUS at 10:46

## 2020-02-13 LAB
BH CV STRESS BP STAGE 1: NORMAL
BH CV STRESS COMMENTS STAGE 1: NORMAL
BH CV STRESS DOSE REGADENOSON STAGE 1: 0.4
BH CV STRESS DURATION MIN STAGE 1: 0
BH CV STRESS DURATION SEC STAGE 1: 10
BH CV STRESS HR STAGE 1: 87
BH CV STRESS PROTOCOL 1: NORMAL
BH CV STRESS RECOVERY BP: NORMAL MMHG
BH CV STRESS RECOVERY HR: 81 BPM
BH CV STRESS STAGE 1: 1
LV EF NUC BP: 54 %
MAXIMAL PREDICTED HEART RATE: 159 BPM
PERCENT MAX PREDICTED HR: 54.72 %
STRESS BASELINE BP: NORMAL MMHG
STRESS BASELINE HR: 69 BPM
STRESS PERCENT HR: 64 %
STRESS POST EXERCISE DUR SEC: 10 SEC
STRESS POST PEAK BP: NORMAL MMHG
STRESS POST PEAK HR: 87 BPM
STRESS TARGET HR: 135 BPM

## 2020-02-25 ENCOUNTER — HOSPITAL ENCOUNTER (OUTPATIENT)
Dept: CT IMAGING | Facility: HOSPITAL | Age: 60
Discharge: HOME | End: 2020-02-25
Admitting: THORACIC SURGERY (CARDIOTHORACIC VASCULAR SURGERY)

## 2020-02-25 DIAGNOSIS — I71.20 THORACIC AORTIC ANEURYSM WITHOUT RUPTURE (HCC): ICD-10-CM

## 2020-02-25 LAB — CREAT BLDA-MCNC: 1.4 MG/DL (ref 0.6–1.3)

## 2020-02-25 PROCEDURE — 82565 ASSAY OF CREATININE: CPT

## 2020-02-25 PROCEDURE — 71275 CT ANGIOGRAPHY CHEST: CPT

## 2020-02-25 PROCEDURE — 0 IOPAMIDOL PER 1 ML: Performed by: THORACIC SURGERY (CARDIOTHORACIC VASCULAR SURGERY)

## 2020-02-25 RX ADMIN — IOPAMIDOL 125 ML: 755 INJECTION, SOLUTION INTRAVENOUS at 12:52

## 2020-02-26 ENCOUNTER — OFFICE VISIT (OUTPATIENT)
Dept: CARDIAC SURGERY | Facility: CLINIC | Age: 60
End: 2020-02-26

## 2020-02-26 VITALS
DIASTOLIC BLOOD PRESSURE: 78 MMHG | SYSTOLIC BLOOD PRESSURE: 136 MMHG | WEIGHT: 251 LBS | HEART RATE: 82 BPM | OXYGEN SATURATION: 98 % | HEIGHT: 72 IN | BODY MASS INDEX: 34 KG/M2

## 2020-02-26 DIAGNOSIS — I71.20 THORACIC AORTIC ANEURYSM WITHOUT RUPTURE (HCC): Primary | ICD-10-CM

## 2020-02-26 DIAGNOSIS — R91.8 LUNG NODULES: ICD-10-CM

## 2020-02-26 PROCEDURE — 99213 OFFICE O/P EST LOW 20 MIN: CPT | Performed by: THORACIC SURGERY (CARDIOTHORACIC VASCULAR SURGERY)

## 2020-03-16 NOTE — PROGRESS NOTES
"Subjective   Chief Complaint   Patient presents with   • Thoracic Aneurysm     Patient is here for a follow up w/ ct.    • Lung Nodule       Patient ID: Carlos A Boyer Jr. is a 59 y.o. male who is here for follow-up having had Urgent CABG- 2V (left internal mammary artery/left anterior descending and reverse saphenous vein graft/first obtuse marginal) with open left greater saphenous vein harvest performed on 7/6/2019    History of Present Illness    He returns for for follow up of lung nodule and thoracic aneurysm.    Non smoker.    He denies unintended weight loss, hoarseness of voice, chest pain, hemoptysis, history of pneumonia, or cough.  NO chest pain or shortness of breath.      The following portions of the patient's history were reviewed and updated as appropriate: allergies, current medications, past family history, past medical history, past social history, past surgical history and problem list.        Objective   Visit Vitals  /78 (BP Location: Left arm, Patient Position: Sitting, Cuff Size: Large Adult)   Pulse 82   Ht 182.9 cm (72.01\")   Wt 114 kg (251 lb)   SpO2 98%   BMI 34.03 kg/m²       Physical Exam   Constitutional: He is oriented to person, place, and time. He appears well-developed and well-nourished. No distress.   HENT:   Head: Normocephalic and atraumatic.   Eyes: Pupils are equal, round, and reactive to light.   Neck: Normal range of motion. Neck supple.   Cardiovascular: Normal rate, regular rhythm and normal heart sounds. Exam reveals no friction rub.   No murmur heard.  Pulmonary/Chest: Effort normal and breath sounds normal. No respiratory distress. He has no wheezes. He has no rales.   The sternum is well healed.   Abdominal: Soft. He exhibits no distension. There is no tenderness. There is no guarding.   Musculoskeletal: He exhibits no edema.   Saphenectomy incision is c/d/i.     Neurological: He is alert and oriented to person, place, and time. No cranial nerve deficit. "   Skin: Skin is warm and dry. No rash noted. He is not diaphoretic. No pallor.   Psychiatric: He has a normal mood and affect. His behavior is normal.   Vitals reviewed.    Study Result     EXAMINATION: CT ANGIOGRAM CHEST- 2/25/2020 4:04 PM CST     HISTORY: Thoracic aortic aneurysm     COMPARISON: CTA chest 09/07/2019     DOSE: 381 mGy-cm     TECHNIQUE: Sequential imaging was performed from the thoracic inlet  through the upper abdomen following the administration of IV contrast.   Sagittal and coronal reformations were made from the original source  data and reviewed. Additionally, 3-D MIPS reconstructions of the vessels  were made per CTA protocol. Automated exposure control was also utilized  to decrease patient radiation dose.     FINDINGS:   The visualized thyroid gland is grossly normal in appearance. The  trachea and main bronchi appear widely patent and in normal anatomic  position. There is some thickening of the distal esophageal wall, which  may be related to a small hiatal hernia.     No pathologically enlarged axillary, mediastinal, or hilar lymph nodes  are identified. There has been interval decrease in size of mediastinal  and hilar lymph nodes.     The heart appears normal in size. There is no pericardial or pleural  effusion. Coronary artery calcifications are present. Post CABG changes  are noted. The ascending thoracic aorta measures up to 4.0 cm in  diameter. Main pulmonary artery is dilated, suggesting pulmonary  arterial hypertension. No filling defects are identified to suggest PE.     There is minimal atelectasis in the lingula. A tiny nodule is noted in  the lingula measuring 4 mm in size and (axial image 83), new from the  previous exam. There are dependent changes in the lung bases. A tiny  nodule is seen in the peripheral right upper lobe measuring 3-4 mm in  size (axial image 49), stable. A right lower lobe nodule is seen  measuring 4-5 mm in size (series axial image 61), also stable. A  tiny  peripheral nodule is seen in the right upper lobe. Additional tiny  nodules are noted throughout the right lung.     Review of the visualized portion of the upper abdomen demonstrates no  acute abnormalities.     Review of the visualized osseous structures demonstrates no acute or  aggressive lesions. Degenerative spurring is noted in the spine. There  has been prior median sternotomy.     IMPRESSION:  1. Dilation of the ascending thoracic aorta to 4.0 cm.  2. Dilation of the main pulmonary artery suggests pulmonary arterial  hypertension.  3. Atherosclerosis of the aorta and coronary arteries.  4. Tiny bilateral pulmonary nodules are favored to be postinflammatory  in nature. Consider follow-up CT in 12 months to document stability or  resolution.           This report was finalized on 02/25/2020 16:19 by Dr. Ramin Balderas MD.           Assessment/Plan       Carlos A was seen today for thoracic aneurysm and lung nodule.    Diagnoses and all orders for this visit:    Thoracic aortic aneurysm without rupture (CMS/HCC)  -     CT chest w contrast; Future    Lung nodules  -     CT chest w contrast; Future         Overall, Carlos A Boyer Jr. is doing well.  He has asymptomatic.  His lung nodules appear to be inflammatory in nature.  His aneurysm measures 4 cm in size in the short axis in greatest dimension.  It is fusiform.We discussed signs /symptoms of acute aortic pathology including signs/symptoms of lung nodules.      Patient's Body mass index is 34.03 kg/m². BMI is above normal parameters. Recommendations include: educational material, referral to primary care and establishing durable lifestyle changes to accomplish an acceptable weight for age and height.    Carlos A Boyer Jr. is a non-smoker and therefore does not need tobacco cessation education/counseling.      RTC 12 months.

## 2020-04-16 ENCOUNTER — TELEPHONE (OUTPATIENT)
Dept: CARDIOLOGY | Facility: CLINIC | Age: 60
End: 2020-04-16

## 2020-04-16 NOTE — TELEPHONE ENCOUNTER
"Patient called and left a voicemail stating he wanted to talk to \"Skye\" with the heart doctor.  He stated he has been exposed to someone with Covid-19.  He wants to know what he can take for pain?  Please call when available regarding this.  Thank you!  "

## 2020-04-16 NOTE — TELEPHONE ENCOUNTER
I think he needs to contact his PCP. Please discuss with Dr. Tang to make sure he doesn't have additional recommendations.

## 2020-04-30 RX ORDER — LEVETIRACETAM 500 MG/1
TABLET ORAL
Qty: 630 TABLET | Refills: 1 | Status: SHIPPED | OUTPATIENT
Start: 2020-04-30 | End: 2021-07-06 | Stop reason: SDUPTHER

## 2020-05-19 ENCOUNTER — TELEMEDICINE (OUTPATIENT)
Dept: CARDIOLOGY | Facility: CLINIC | Age: 60
End: 2020-05-19

## 2020-05-19 VITALS
SYSTOLIC BLOOD PRESSURE: 140 MMHG | HEIGHT: 72 IN | BODY MASS INDEX: 34 KG/M2 | HEART RATE: 80 BPM | DIASTOLIC BLOOD PRESSURE: 86 MMHG | WEIGHT: 251 LBS

## 2020-05-19 DIAGNOSIS — I10 ESSENTIAL HYPERTENSION: ICD-10-CM

## 2020-05-19 DIAGNOSIS — D69.1 ASPIRIN-LIKE PLATELET FUNCTION DEFECT (HCC): ICD-10-CM

## 2020-05-19 DIAGNOSIS — E11.42 DIABETIC PERIPHERAL NEUROPATHY (HCC): ICD-10-CM

## 2020-05-19 DIAGNOSIS — Z99.89 OSA ON CPAP: ICD-10-CM

## 2020-05-19 DIAGNOSIS — J32.0 CHRONIC SINUSITIS OF BOTH MAXILLARY SINUSES: ICD-10-CM

## 2020-05-19 DIAGNOSIS — R94.39 ABNORMAL STRESS TEST: Primary | ICD-10-CM

## 2020-05-19 DIAGNOSIS — G40.909 SEIZURE DISORDER (HCC): ICD-10-CM

## 2020-05-19 DIAGNOSIS — I48.0 PAROXYSMAL ATRIAL FIBRILLATION (HCC): ICD-10-CM

## 2020-05-19 DIAGNOSIS — I65.22 STENOSIS OF LEFT CAROTID ARTERY: ICD-10-CM

## 2020-05-19 DIAGNOSIS — R91.8 LUNG NODULES: ICD-10-CM

## 2020-05-19 DIAGNOSIS — E11.9 TYPE 2 DIABETES MELLITUS WITHOUT COMPLICATION, WITH LONG-TERM CURRENT USE OF INSULIN (HCC): Chronic | ICD-10-CM

## 2020-05-19 DIAGNOSIS — I25.119 CORONARY ARTERY DISEASE INVOLVING NATIVE CORONARY ARTERY OF NATIVE HEART WITH ANGINA PECTORIS (HCC): ICD-10-CM

## 2020-05-19 DIAGNOSIS — G47.33 OSA ON CPAP: ICD-10-CM

## 2020-05-19 DIAGNOSIS — R01.1 CARDIAC MURMUR: ICD-10-CM

## 2020-05-19 DIAGNOSIS — I69.30 CHRONIC LEFT ARTERIAL ISCHEMIC STROKE, ICA (INTERNAL CAROTID ARTERY): ICD-10-CM

## 2020-05-19 DIAGNOSIS — I71.20 THORACIC AORTIC ANEURYSM WITHOUT RUPTURE (HCC): ICD-10-CM

## 2020-05-19 DIAGNOSIS — J90 PLEURAL EFFUSION: ICD-10-CM

## 2020-05-19 DIAGNOSIS — Z86.39 HISTORY OF HYPERLIPIDEMIA: ICD-10-CM

## 2020-05-19 DIAGNOSIS — K21.9 GASTROESOPHAGEAL REFLUX DISEASE WITHOUT ESOPHAGITIS: Chronic | ICD-10-CM

## 2020-05-19 DIAGNOSIS — Z79.899 LONG-TERM USE OF HIGH-RISK MEDICATION: ICD-10-CM

## 2020-05-19 DIAGNOSIS — I77.9 BILATERAL CAROTID ARTERY DISEASE, UNSPECIFIED TYPE (HCC): ICD-10-CM

## 2020-05-19 DIAGNOSIS — E78.2 MIXED HYPERLIPIDEMIA: ICD-10-CM

## 2020-05-19 DIAGNOSIS — Z79.4 TYPE 2 DIABETES MELLITUS WITHOUT COMPLICATION, WITH LONG-TERM CURRENT USE OF INSULIN (HCC): Chronic | ICD-10-CM

## 2020-05-19 DIAGNOSIS — J34.3 HYPERTROPHY OF BOTH INFERIOR NASAL TURBINATES: ICD-10-CM

## 2020-05-19 DIAGNOSIS — I48.92 ATRIAL FLUTTER, UNSPECIFIED TYPE (HCC): ICD-10-CM

## 2020-05-19 PROCEDURE — 99213 OFFICE O/P EST LOW 20 MIN: CPT | Performed by: INTERNAL MEDICINE

## 2020-05-19 NOTE — PROGRESS NOTES
Carlos A Boyer Jr.  [id number]  1960  60 y.o.  male      You have chosen to receive care through a telehealth visit.  Do you consent to use a video/audio connection for your medical care today? Yes     Referring Provider: Vinayak Correia PA    Reason for Follow-up Visit:       Routine virtual visit using above modality   Prior ER visit for Paroxysmal atrial fibrillation with with rapid ventricular response    ACRLOS guided cardioversion, on anticoagulation with Eliquis  coronary artery disease stented coronary artery   Type 2 diabetes mellitus   S/p CABG   S/P  right rotator cuff surgery Dr Divina Michelle [facility]   myocardial perfusion scan as below     Subjective    Overall feels well    No new events or complaints since last visit   Overall the patient feels no major change from baseline symptoms   Overall feels well    Mild chronic exertional shortness of breath on exertion relieved with rest  No significant cough or wheezing    No palpitations  No associated chest pain  No significant pedal edema    No fever or chills  No significant expectoration    No hemoptysis  No presyncope or syncope    Tolerating current medications well with no untoward side effects   Compliant with prescribed medication regimen. Tries to adhere to cardiac diet.     Completed cardiac rehab   Had right sided shoulder  And now healing and in therapy almost completed    BP well controlled at home.     Blood sugars well controlled at home    Exercising more     History of present illness:  Carlos A Boyer Jr. is a 60 y.o. yo male with history of chest pain who presents today for   Chief Complaint   Patient presents with   • Follow-up   .    History  Past Medical History:   Diagnosis Date   • Arthritis    • Asthma    • Carotid disease, bilateral (CMS/HCC)    • Chest pain    • Chronic ischemic heart disease    • Chronic sinusitis    • Maribell bullosa    • Coronary artery disease    • Deviated septum    • Diabetes mellitus (CMS/HCC)    •  Difficulty urinating    • Diverticulitis    • Enlarged prostate    • Fatty liver    • GERD (gastroesophageal reflux disease)    • Hyperlipidemia    • Hypertension    • Hypertrophy of nasal turbinates    • Keratoderma    • Kidney stone    • Migraine    • Murmur, heart    • Myocardial infarction (CMS/HCC)    • Obesity    • PONV (postoperative nausea and vomiting)    • Psoriasis    • Seizures (CMS/HCC)    • Sinus congestion    • Sleep apnea    • SOB (shortness of breath)    • Stroke (CMS/HCC)    • UTI (urinary tract infection)    ,   Past Surgical History:   Procedure Laterality Date   • CARDIAC CATHETERIZATION  01/2016    Dr. Broadbent; widely patent previously placed stents in the left anterior descending and obstructive disease involving the diagonal branch which was treated medically   • CARDIAC CATHETERIZATION N/A 7/14/2017    Procedure: Left Heart Cath;  Surgeon: Wade Ramey MD;  Location:  [facility] CATH INVASIVE LOCATION;  Service:    • CARDIAC CATHETERIZATION Left 10/15/2018    Procedure: Cardiac Catheterization/Vascular Study;  Surgeon: Wade Ramey MD;  Location:  [facility] CATH [facility];  Service: Cardiology   • CARDIAC CATHETERIZATION  10/15/2018    Procedure: Functional Flow [place];  Surgeon: Wade Ramey MD;  Location:  [facility] CATH [facility];  Service: Cardiology   • CARDIAC CATHETERIZATION N/A 10/15/2018    Procedure: Left ventriculography;  Surgeon: Wade Ramey MD;  Location:  [facility] CATH [facility];  Service: Cardiology   • CARDIAC CATHETERIZATION Left 6/26/2019    Procedure: Cardiac Catheterization/Vascular Study VEL OK  HE WILL WAIT 1 YEAR FOR SHOULDER SURGERY ;  Surgeon: Wade Ramey MD;  Location: [facility] CATH INVASIVE LOCATION;  Service: Cardiology   • CHOLECYSTECTOMY     • CHOLECYSTECTOMY WITH INTRAOPERATIVE CHOLANGIOGRAM N/A 8/1/2018    Procedure: CHOLECYSTECTOMY LAPAROSCOPIC INTRAOPERATIVE CHOLANGIOGRAM;  Surgeon: Shane Ann MD;  Location: [facility] OR;  Service:  General   • CORONARY ANGIOPLASTY     • CORONARY ARTERY BYPASS GRAFT N/A 2019    Procedure: CABG X2 WITH LIMA, LEFT LEG OVH, AND PLACEMENT OF LEFT FEMORAL ARTERIAL LINE;  Surgeon: Steven Tang MD;  Location: [facility] OR;  Service: Cardiothoracic   • CORONARY STENT PLACEMENT      x 6   • ENDOSCOPIC FUNCTIONAL SINUS SURGERY (FESS) Bilateral 2017    Procedure: PROCEDURE PERFORMED:  Bilateral functional endoscopic anterior ethmoidectomy with bilateral middle meatal antrostomy Septoplasty Right kathia bullosa resection Bilateral inferior turbinate reduction via Coblation;  Surgeon: Mayank Ibarra MD;  Location: [facility] OR;  Service:    • ENDOSCOPY N/A 2018    Procedure: ESOPHAGOGASTRODUODENOSCOPY WITH ANESTHESIA;  Surgeon: Benitez Mas MD;  Location: [facility] ENDOSCOPY;  Service: Gastroenterology   • HERNIA REPAIR      x2 inguinal area   • KIDNEY STONE SURGERY     • KNEE SURGERY Right    • OTHER SURGICAL HISTORY      urolift   • PROSTATE SURGERY      Dr. Badillo -    • ROTATOR CUFF REPAIR     • THUMB AMPUTATION Left     partial   • TOE AMPUTATION Right     big   ,   Family History   Problem Relation Age of Onset   • Heart disease Father    • COPD Mother    • Hypertension Mother    • Asthma Mother    • No Known Problems Sister    • Prostate cancer Maternal Grandfather    • No Known Problems Sister    ,   Social History     Tobacco Use   • Smoking status: Former Smoker     Packs/day: 1.50     Years: 4.50     Pack years: 6.75     Types: Cigarettes, Cigars     Last attempt to quit:      Years since quittin.3   • Smokeless tobacco: Former User     Types: Chew     Quit date:    Substance Use Topics   • Alcohol use: No   • Drug use: No   ,     Medications  Current Outpatient Medications   Medication Sig Dispense Refill   • albuterol (PROVENTIL HFA;VENTOLIN HFA) 108 (90 BASE) MCG/ACT inhaler Inhale 2 puffs Every 6 (Six) Hours As Needed for wheezing.     • apixaban (ELIQUIS) 5 MG tablet  tablet Take 1 tablet by mouth Every 12 (Twelve) Hours. 60 tablet 5   • aspirin 81 MG chewable tablet Chew 81 mg Daily.     • calcium polycarbophil (FIBERCON) 625 MG tablet Take 625 mg by mouth Daily.     • carboxymethylcellulose (REFRESH PLUS) 0.5 % solution Administer 1 drop to both eyes 4 (Four) Times a Day As Needed for Dry Eyes.     • Empagliflozin (JARDIANCE) 10 MG tablet Take 1 tablet by mouth Daily.     • fluticasone (FLONASE) 50 MCG/ACT nasal spray 2 sprays into each nostril Daily.     • furosemide (LASIX) 40 MG tablet TAKE 1 TABLET BY MOUTH ONCE DAILY AS NEEDED FOR WEIGHT GAIN GREATER THAN 2 LBS IN A DAY OR INCREASING CHEST CONGESTION  0   • insulin aspart (novoLOG FLEXPEN) 100 UNIT/ML solution pen-injector sc pen Inject 30 Units under the skin into the appropriate area as directed Every Morning. 34 units  at breakfast, 30 units at lunch, 34 units hs     • Insulin Glargine (LANTUS SOLOSTAR) 100 UNIT/ML injection pen Inject 60-90 Units under the skin into the appropriate area as directed 2 (Two) Times a Day. 65 units QAM AND 70 units QPM     • lamoTRIgine (LaMICtal) 200 MG tablet Take 1 tablet by mouth 2 (Two) Times a Day. 180 tablet 1   • levETIRAcetam (KEPPRA) 500 MG tablet Take 3 tablets every morning, take 4 tablets every night. 630 tablet 1   • Liraglutide (VICTOZA SC) Inject 1.2 mg under the skin into the appropriate area as directed Every Night. 1.2     • lisinopril (PRINIVIL,ZESTRIL) 20 MG tablet Take 1 tablet by mouth Daily. 90 tablet 3   • metFORMIN (GLUCOPHAGE) 1000 MG tablet Take 1 tablet by mouth 2 (Two) Times a Day With Meals. HOLD x 48 hours post contrast. May resume 3/18/18     • Multiple Vitamin (MULTI VITAMIN PO) Take 1 tablet by mouth Daily.     • mupirocin (BACTROBAN) 2 % ointment Apply 1 application topically to the appropriate area as directed 3 (Three) Times a Day.     • nitroglycerin (NITROSTAT) 0.4 MG SL tablet Place 0.4 mg under the tongue Every 5 (Five) Minutes As Needed for chest  pain. Take no more than 3 doses in 15 minutes.     • ondansetron ODT (ZOFRAN-ODT) 4 MG disintegrating tablet Take 1 tablet by mouth Every 4 (Four) Hours As Needed for Nausea or Vomiting. 10 tablet 0   • pantoprazole (PROTONIX) 40 MG EC tablet Take 40 mg by mouth 2 (Two) Times a Day.     • psyllium (METAMUCIL) 58.6 % packet Take 1 packet by mouth Daily As Needed (constipation).     • urea (CARMOL) 40 % ointment Apply  topically to the appropriate area as directed As Needed for Dry Skin.       No current facility-administered medications for this visit.        Allergies:  Flagyl [metronidazole]; Atorvastatin; and Ciprofloxacin    Review of Systems  Review of Systems   Constitution: Positive for malaise/fatigue.   HENT: Negative.    Eyes: Negative.    Cardiovascular: Positive for chest pain and dyspnea on exertion. Negative for claudication, cyanosis, irregular heartbeat, leg swelling, near-syncope, orthopnea, palpitations, paroxysmal nocturnal dyspnea and syncope.   Respiratory: Negative.    Endocrine: Negative.    Hematologic/Lymphatic: Negative.    Skin: Negative.    Musculoskeletal: Positive for arthritis and back pain.   Gastrointestinal: Negative for anorexia.   Genitourinary: Negative.    Neurological: Positive for dizziness, light-headedness and weakness.   Psychiatric/Behavioral: Negative.        Objective     Physical Exam:      Self reported vitals above as available      • General appearance is normal  • Normal judgment and insight  • Normal assessment of mental status, which may include:  - Orientation to time, place, and person  - Recent and remote memory  - Mood and affect  • Normal external appearance of the ears and nose  • Normal assessment of hearing  • Normal external appearance of lips  • Normal external appearance of anterior surface of neck  • Normal respiratory effort  • No reported lower extremity edema  • No obvious skin abnormalities of visualized skin surfaces    • Normal examination of  "digits and nails (clubbing, cyanosis ,ischemia)  • No obvious abnormalities of visualized joint or musculature  • Normalized range of motion of visualized muscle groups            Vitals:    20 0850   BP: 140/86   Pulse: 80   Weight: 114 kg (251 lb)   Height: 182.9 cm (72\")         Physical Exam   Constitutional: He appears well-developed.   HENT:   Head: Normocephalic.   Neck: Normal carotid pulses and no JVD present. No tracheal tenderness present. Carotid bruit is not present. No tracheal deviation and no edema present.   Cardiovascular: Regular rhythm, normal heart sounds and normal pulses.   Pulmonary/Chest: Effort normal. No stridor.   Abdominal: Soft. He exhibits no distension. There is no hepatosplenomegaly. There is no tenderness.   Neurological: He is alert. He has normal strength. No cranial nerve deficit or sensory deficit.   Skin: Skin is warm.   Psychiatric: He has a normal mood and affect. His speech is normal and behavior is normal.     Wounds healing very well. No evidence of excessive bruising, pain, redness, swelling, discharge, foul odor or associated fever or chills.     Results Review:      Carlos A Boyer Jr.   Regadenoson Stress Test With Myocardial Perfusion SPECT (Multi Study)   Order# [id number]   Reading physician: Wade Ramey MD Ordering physician: Wade Ramey MD Study date: 20   Patient Information     Patient Name  Carlos A Boyer Jr. MRN  [id number] Sex  Male  (Age)  1960 (60 y.o.)   Interpretation Summary     · Left ventricular ejection fraction is normal (Calculated EF = 54%).  · Myocardial perfusion imaging indicates a medium-sized infarct located in the anterior wall and apex with no significant ischemia noted.  · Diaphragmatic attenuation and GI artifacts are present.  · Raw images reviewed with the following abnormalities noted: vertical motion.           Conclusion of cardiac catheterization     Left main coronary arteries normal  Left anterior descending " coronary artery has patent stents  The distal edge of the stent in the mid left anterior descending coronary artery has approximately 60% stenosis  Highly abnormal fractional flow reserve of this at 0.78 without adenosine stress.  PTCA done of the segment.  Despite multiple attempts and use of guide liner could not advance the stent due to earlier implanted stents.  Left circumflex coronary artery is codominant and large  The proximal portion of the first obtuse marginal branch has a 60% stenosis with normal fractional flow reserve above 0.85.  Right coronary artery is large and codominant without any high-grade lesions.     Left ventricular end-diastolic pressure is mildly elevated to 15 mmHg.  No gradient across aortic valve on pullback.  Left ventriculography shows a hyperdynamic left ventricle with ejection fraction of 75%.     Percutaneous coronary intervention     XB 3.5 guide advised used for fractional flow reserve of the obtuse marginal branch as well as of the left anterior descending coronary artery  Then we did try to advance a 2.5 mm stent.  We used 2 different size stents and could not cross  We used a guide liner and does not did not have placed cross across the stent.  We then did balloon angioplasty using 2.0 mm balloon.  Stenosis was reduced from 60% down to less than 10%.  BEVERLEY-3 flow before and after procedure.           ____________________________________________________________________________________________________________________________________________     Plan after cardiac catheterization        Dual antiplatelet therapy minimum of 1 year preferably longer  Statin ACE inhibitors beta-blockers  If there is repeat restenosis of the left anterior descending coronary artery would consider surgical revascularization.  Target LDL less than 70 mg/dL.  Maximize antianginal therapy.  Intensive risk factor modifications for both primary and secondary prevention if applicable  Hydration    Observation     Results for orders placed during the hospital encounter of 09/05/19   Adult Transesophageal Echo (CARLOS) W/ Cont if Necessary Per Protocol    Narrative · No evidence of left atrial appendage thrombus - okay to proceed with   cardioversion.  · Left ventricular systolic function is normal.  · Moderate mitral valve regurgitation is present  · Mild aortic insufficiency.  · Normal size and function of the right ventricle.  · Negative bubble study.      10/15/18        Conclusion     The left main coronary artery is normal  Left anterior descending coronary artery and diagonal branches are patent with a widely patent stent noted in the  left anterior descending coronary artery    Mid left anterior descending coronary artery is a 50% stenosis and hemodynamically not significant.  Left circumflex coronary artery is large with a first obtuse marginal around 40-60% stenosis.  Fractional flow reserve of this is completely normal and at above 0.9.  Left circumflex coronary artery is a codominant.  Right coronary artery is codominant large with distal small vessel disease.     Left ventricular end-diastolic pressure is normal at 11 mmHg no gradient across aortic valve on pullback  Left ventriculography shows ejection fraction of 55%.           Plan     Continue current medication  Symptomatic treatment for small vessel disease with antianginals  If stable can be discharged home later today  Intensive risk factor modifications for both primary and secondary prevention if applicable  Hydration   Observation         7/17 cath  Conclusion  Nonocclusive epicardial coronary arteries with widely patent stents in the  left anterior descending coronary artery   artery and left circumflex coronary artery.  Hyperdynamic left ventricle with ejection fraction of 75%.  LVEDP   17    mm Hg     Plan  Workup for noncardiac causes of chest pain this can be done as outpatient.  His d-dimer is within normal range  After a period of  observation of stable can be discharged either later today.  Keep follow-up appointment with me  Ongoing risk factor modifications keep LDL below 70 mg/dL  Hydration   Observation     Procedures    Assessment/Plan   Patient Active Problem List   Diagnosis   • Type 2 diabetes mellitus without complication, with long-term current use of insulin (CMS/HCC)   • Gastroesophageal reflux disease without esophagitis   • Essential hypertension   • Seizure disorder (CMS/HCC)   • Carotid disease, bilateral (CMS/HCC)   • Coronary artery disease involving native coronary artery of native heart with angina pectoris (CMS/HCC)   • Mixed hyperlipidemia   • Chronic left arterial ischemic stroke, ICA (internal carotid artery)   • HOA on CPAP   • Benign non-nodular prostatic hyperplasia with lower urinary tract symptoms   • Deviated nasal septum   • Maribell bullosa   • Hypertrophy of both inferior nasal turbinates   • Chronic sinusitis of both maxillary sinuses   • S/P FESS (functional endoscopic sinus surgery)   • Diabetic complication (CMS/[facility])   • Epigastric pain   • Diabetic peripheral neuropathy (CMS/HCC)   • Abnormal stress test   • Stenosis of left carotid artery   • Class 1 obesity in adult   • 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CMS/[facility])   • History of seizure   • Lung nodules   • Cardiac murmur   • Thoracic aortic aneurysm without rupture (CMS/HCC)   • Paroxysmal atrial fibrillation (CMS/HCC)   • Atrial flutter (CMS/[facility]) with cardioversion on Sept 5, 2019   • Acute diastolic congestive heart failure (CMS/[facility])   • Fluid overload   • Pleural effusion   • History of hyperlipidemia   • Respiratory distress   • Long-term use of high-risk medication           Plan       The current medical regimen is effective;  continue present plan and medications.   Continue ASA 81 mg daily and Eliquis 5 mg BID     Call for results of above testing prior to starting heavy exercise program    Keep A1c less than 7 Primary to monitor, last A1 c  was 7.1   Keep LDL below 70 mg/dl. Monitor liver and renal functions.   Monitor CBC, CMP, TSH (as indicated) and Lipid Panel by primary      S/L NTG PRN for chest pain, call me or go to ER if has to use S/L nitroglycerin     Weigh yourself frequently, at least weekly, preferably daily, call me if more than 2 pounds a day or 5 pounds a week weight gain.  Flexible diuretic dosing     No additional cardiac testing required at this point in time.      Questions were encouraged, asked and answered to the patient's  understanding and satisfaction.     The current medical regimen is effective;  continue present plan and medications.     ____________________________________________________________________________________________________________________________________________  Health maintenance and recommendations      Offered to give patient  a copy      Questions were encouraged, asked and answered to the patient's  understanding and satisfaction. Questions if any regarding current medications and side effects, need for refills and importance of compliance to medications stressed.    Reviewed available prior notes, consults, prior visits, laboratory findings, radiology and cardiology relevant reports. Updated chart as applicable. I have reviewed the patient's medical history in detail and updated the computerized patient record as relevant.      Updated patient regarding any new or relevant abnormalities on review of records or any new findings on physical exam. Mentioned to patient about purpose of visit and desirable health short and long term goals and objectives.    Primary to monitor CBC CMP Lipid panel and TSH as applicable    ___________________________________________________________________________________________________________________________________________              Return in about 6 months (around 11/19/2020).

## 2020-06-03 DIAGNOSIS — G40.909 SEIZURE DISORDER (HCC): Primary | ICD-10-CM

## 2020-06-03 RX ORDER — LAMOTRIGINE 200 MG/1
200 TABLET ORAL 2 TIMES DAILY
Qty: 180 TABLET | Refills: 1 | Status: SHIPPED | OUTPATIENT
Start: 2020-06-03 | End: 2021-07-06 | Stop reason: SDUPTHER

## 2020-06-22 ENCOUNTER — OFFICE VISIT (OUTPATIENT)
Dept: GASTROENTEROLOGY | Facility: CLINIC | Age: 60
End: 2020-06-22

## 2020-06-22 ENCOUNTER — TELEPHONE (OUTPATIENT)
Dept: CARDIOLOGY | Facility: CLINIC | Age: 60
End: 2020-06-22

## 2020-06-22 VITALS
OXYGEN SATURATION: 98 % | HEIGHT: 72 IN | WEIGHT: 249 LBS | TEMPERATURE: 97.6 F | SYSTOLIC BLOOD PRESSURE: 112 MMHG | HEART RATE: 83 BPM | BODY MASS INDEX: 33.72 KG/M2 | DIASTOLIC BLOOD PRESSURE: 78 MMHG

## 2020-06-22 DIAGNOSIS — K21.9 GASTROESOPHAGEAL REFLUX DISEASE, ESOPHAGITIS PRESENCE NOT SPECIFIED: ICD-10-CM

## 2020-06-22 DIAGNOSIS — R13.10 DYSPHAGIA, UNSPECIFIED TYPE: ICD-10-CM

## 2020-06-22 DIAGNOSIS — R10.32 LEFT LOWER QUADRANT ABDOMINAL PAIN: Primary | ICD-10-CM

## 2020-06-22 PROCEDURE — 99214 OFFICE O/P EST MOD 30 MIN: CPT | Performed by: NURSE PRACTITIONER

## 2020-06-22 NOTE — TELEPHONE ENCOUNTER
Patient called and left a voicemail stating that he needs to speak with someone in Dr. Ramey's office regarding [facility].  He can be reached at [phone].  Thank you!

## 2020-07-07 ENCOUNTER — TRANSCRIBE ORDERS (OUTPATIENT)
Dept: ADMINISTRATIVE | Facility: HOSPITAL | Age: 60
End: 2020-07-07

## 2020-07-07 DIAGNOSIS — Z01.818 PREOP TESTING: Primary | ICD-10-CM

## 2020-07-13 ENCOUNTER — LAB (OUTPATIENT)
Dept: LAB | Facility: HOSPITAL | Age: 60
End: 2020-07-13

## 2020-07-13 DIAGNOSIS — G40.909 SEIZURE DISORDER (HCC): ICD-10-CM

## 2020-07-13 LAB — SARS-COV-2 N GENE RESP QL NAA+PROBE: NOT DETECTED

## 2020-07-13 PROCEDURE — C9803 HOPD COVID-19 SPEC COLLECT: HCPCS | Performed by: INTERNAL MEDICINE

## 2020-07-13 PROCEDURE — 87635 SARS-COV-2 COVID-19 AMP PRB: CPT | Performed by: INTERNAL MEDICINE

## 2020-07-13 RX ORDER — LAMOTRIGINE 200 MG/1
200 TABLET ORAL 2 TIMES DAILY
Qty: 180 TABLET | Refills: 1 | Status: CANCELLED | OUTPATIENT
Start: 2020-07-13

## 2020-07-13 NOTE — TELEPHONE ENCOUNTER
Carlos A notified the script Lamictal [facility] last [facility].  [facility] said [facility] didn't [facility] on [facility].   [facility].

## 2020-07-14 ENCOUNTER — ANESTHESIA EVENT (OUTPATIENT)
Dept: GASTROENTEROLOGY | Facility: HOSPITAL | Age: 60
End: 2020-07-14

## 2020-07-14 ENCOUNTER — HOSPITAL ENCOUNTER (OUTPATIENT)
Facility: HOSPITAL | Age: 60
Setting detail: HOSPITAL OUTPATIENT SURGERY
Discharge: HOME | End: 2020-07-14
Attending: INTERNAL MEDICINE | Admitting: INTERNAL MEDICINE

## 2020-07-14 ENCOUNTER — ANESTHESIA (OUTPATIENT)
Dept: GASTROENTEROLOGY | Facility: HOSPITAL | Age: 60
End: 2020-07-14

## 2020-07-14 VITALS
SYSTOLIC BLOOD PRESSURE: 122 MMHG | DIASTOLIC BLOOD PRESSURE: 86 MMHG | WEIGHT: 248 LBS | RESPIRATION RATE: 16 BRPM | BODY MASS INDEX: 33.59 KG/M2 | HEART RATE: 78 BPM | OXYGEN SATURATION: 94 % | HEIGHT: 72 IN | TEMPERATURE: 97.8 F

## 2020-07-14 DIAGNOSIS — R10.32 LEFT LOWER QUADRANT ABDOMINAL PAIN: ICD-10-CM

## 2020-07-14 PROCEDURE — 43239 EGD BIOPSY SINGLE/MULTIPLE: CPT | Performed by: INTERNAL MEDICINE

## 2020-07-14 PROCEDURE — 87081 CULTURE SCREEN ONLY: CPT | Performed by: INTERNAL MEDICINE

## 2020-07-14 PROCEDURE — 25010000002 PROPOFOL 10 MG/ML EMULSION: Performed by: NURSE ANESTHETIST, CERTIFIED REGISTERED

## 2020-07-14 PROCEDURE — G0121 COLON CA SCRN NOT HI RSK IND: HCPCS | Performed by: INTERNAL MEDICINE

## 2020-07-14 RX ORDER — SODIUM CHLORIDE 0.9 % (FLUSH) 0.9 %
10 SYRINGE (ML) INJECTION AS NEEDED
Status: CANCELLED | OUTPATIENT
Start: 2020-07-14

## 2020-07-14 RX ORDER — SODIUM CHLORIDE 9 MG/ML
500 INJECTION, SOLUTION INTRAVENOUS CONTINUOUS PRN
Status: DISCONTINUED | OUTPATIENT
Start: 2020-07-14 | End: 2020-07-14 | Stop reason: HOSPADM

## 2020-07-14 RX ORDER — SODIUM CHLORIDE 0.9 % (FLUSH) 0.9 %
10 SYRINGE (ML) INJECTION AS NEEDED
Status: DISCONTINUED | OUTPATIENT
Start: 2020-07-14 | End: 2020-07-14 | Stop reason: HOSPADM

## 2020-07-14 RX ORDER — SODIUM CHLORIDE 9 MG/ML
500 INJECTION, SOLUTION INTRAVENOUS CONTINUOUS PRN
Status: CANCELLED | OUTPATIENT
Start: 2020-07-14

## 2020-07-14 RX ORDER — PROPOFOL 10 MG/ML
VIAL (ML) INTRAVENOUS AS NEEDED
Status: DISCONTINUED | OUTPATIENT
Start: 2020-07-14 | End: 2020-07-14 | Stop reason: SURG

## 2020-07-14 RX ADMIN — PROPOFOL 600 MG: 10 INJECTION, EMULSION INTRAVENOUS at 10:49

## 2020-07-14 RX ADMIN — SODIUM CHLORIDE 500 ML: 9 INJECTION, SOLUTION INTRAVENOUS at 10:37

## 2020-07-14 RX ADMIN — LIDOCAINE HYDROCHLORIDE 50 MG: 20 INJECTION, SOLUTION INTRAVENOUS at 10:49

## 2020-07-14 NOTE — ANESTHESIA POSTPROCEDURE EVALUATION
Patient: Carlos A Boyer Jr.    Procedure Summary     Date:  07/14/20 Room / Location:  [facility] ENDOSCOPY 2 / [facility]    Anesthesia Start:  1043 Anesthesia Stop:  1109    Procedures:       COLONOSCOPY WITH ANESTHESIA (N/A )      ESOPHAGOGASTRODUODENOSCOPY WITH ANESTHESIA (N/A ) Diagnosis:       Left lower quadrant abdominal pain      (Left lower quadrant abdominal pain [R10.32])    Surgeon:  Anupam Morales DO Provider:  Hamilton Edgar CRNA    Anesthesia Type:  MAC ASA Status:  3          Anesthesia Type: MAC    Vitals  Vitals Value Taken Time   BP     Temp     Pulse 79 7/14/2020 11:09 AM   Resp     SpO2 92 % 7/14/2020 11:09 AM   Vitals shown include unvalidated device data.        Post Anesthesia Care and Evaluation    Patient location during evaluation: PACU  Patient participation: complete - patient participated  Level of consciousness: awake and awake and alert  Pain score: 0  Pain management: adequate  Airway patency: patent  Anesthetic complications: No anesthetic complications    Cardiovascular status: acceptable and stable  Respiratory status: acceptable and unassisted  Hydration status: acceptable

## 2020-07-14 NOTE — ANESTHESIA PREPROCEDURE EVALUATION
Anesthesia Evaluation     Patient summary reviewed   no history of anesthetic complications:  NPO Solid Status: > 8 hours  NPO Liquid Status: > 8 hours           Airway   Mallampati: II  TM distance: >3 FB  Neck ROM: full  Dental          Pulmonary - normal exam    breath sounds clear to auscultation  (+) a smoker (quit 1993) Former, asthma (well controlled, using inhalers prn),sleep apnea (not compliant with CPAP, sleepingn with head elevated),   (-) recent URI  Cardiovascular   Exercise tolerance: good (4-7 METS)    ECG reviewed  Patient on routine beta blocker and Beta blocker given within 24 hours of surgery    (+) hypertension, valvular problems/murmurs, past MI  >12 months, CAD, CABG (7/2019), cardiac stents (stents prior to cabg) more than 12 months ago dysrhythmias Atrial Fib, Atrial Flutter, PVD, hyperlipidemia,  carotid artery disease (Left worse than right)  (-) pacemaker    ROS comment: CARLOS 9/2019  · No evidence of left atrial appendage thrombus - okay to proceed with cardioversion.  · Left ventricular systolic function is normal.  · Moderate mitral valve regurgitation is present  · Mild aortic insufficiency.  · Normal size and function of the right ventricle.  · Negative bubble study.       Neuro/Psych  (+) seizures (on keppra and lamictal) well controlled, CVA (2011),     (-) TIA  GI/Hepatic/Renal/Endo    (+) obesity,  GERD,  liver disease fatty liver disease, renal disease, diabetes mellitus using insulin,     Musculoskeletal     Abdominal    Substance History      OB/GYN          Other   arthritis,                        Anesthesia Plan    ASA 3     MAC     intravenous induction     Anesthetic plan, all risks, benefits, and alternatives have been provided, discussed and informed consent has been obtained with: patient.

## 2020-07-15 ENCOUNTER — TELEPHONE (OUTPATIENT)
Dept: NEUROLOGY | Facility: CLINIC | Age: 60
End: 2020-07-15

## 2020-07-15 LAB — UREASE TISS QL: NEGATIVE

## 2020-07-15 NOTE — TELEPHONE ENCOUNTER
THE [facility] CALLED AND STATED [facility]  LAMICTAL 200 MG TABLET WITHOUT THE PREVIOUS DR NOTE FAXED OVER TO THEM . HE DIDN'T HAVE A FAX NUMBER AND SAID IT WAS IN HIS FILE .  HIS BEST CALL BACK NUMBER [phone]

## 2020-07-15 NOTE — TELEPHONE ENCOUNTER
[facility] called and said [facility] provider wanted records to review for the Lamictal script, notified the [facility] said [facility] is being [facility] to be mailed.  She said they don't need the records now.

## 2020-07-15 NOTE — TELEPHONE ENCOUNTER
Left a message requesting a return call.  [facility] said [facility] don't need office notes, the script is being processed to be mailed to him.

## 2020-07-15 NOTE — TELEPHONE ENCOUNTER
Left a message requesting a return call.  [facility]ed to the [facility] this morning, they don't need the chart note.  The script is being processed to be mailed to him.

## 2020-07-15 NOTE — TELEPHONE ENCOUNTER
Patient called back in and advised that [facility] and they are telling him that he needs the office notes. I tried to warm transfer but could not get in touch with anyone.     Patient wants to speak to Ellie to try to get this cleared up.     Can you please call patient back at [phone]?     Thank you

## 2020-07-16 NOTE — TELEPHONE ENCOUNTER
Carlos A notified the [facility] was [facility].  He said he received 20 tablets.  He is to call if he has any problems with the script.

## 2020-07-16 NOTE — TELEPHONE ENCOUNTER
PATIENT REACHED BACK OUT TO OUR OFFICE ON 07/16/2020 TO SPEAK WITH COREY.    PLEASE REACH BACK OUT TO PATIENT.     THANK YOU,  CARLOS A      PATIENT: SHANNAN GOLDSTEIN   YOB: 1960  GOOD CONTACT #[phone]

## 2020-07-26 ENCOUNTER — APPOINTMENT (OUTPATIENT)
Dept: CT IMAGING | Facility: HOSPITAL | Age: 60
End: 2020-07-26

## 2020-07-26 ENCOUNTER — HOSPITAL ENCOUNTER (INPATIENT)
Facility: HOSPITAL | Age: 60
LOS: 1 days | Discharge: HOME | End: 2020-07-27
Attending: FAMILY MEDICINE | Admitting: INTERNAL MEDICINE

## 2020-07-26 ENCOUNTER — APPOINTMENT (OUTPATIENT)
Dept: GENERAL RADIOLOGY | Facility: HOSPITAL | Age: 60
End: 2020-07-26

## 2020-07-26 DIAGNOSIS — R07.9 CHEST PAIN, UNSPECIFIED TYPE: Primary | ICD-10-CM

## 2020-07-26 PROBLEM — U07.1 COVID-19 VIRUS DETECTED: Status: ACTIVE | Noted: 2020-07-26

## 2020-07-26 PROBLEM — R06.02 SHORTNESS OF BREATH: Status: ACTIVE | Noted: 2020-07-26

## 2020-07-26 LAB
ALBUMIN SERPL-MCNC: 4.2 G/DL (ref 3.5–5.2)
ALBUMIN/GLOB SERPL: 1.7 G/DL
ALP SERPL-CCNC: 96 U/L (ref 39–117)
ALT SERPL W P-5'-P-CCNC: 31 U/L (ref 1–41)
ANION GAP SERPL CALCULATED.3IONS-SCNC: 11 MMOL/L (ref 5–15)
AST SERPL-CCNC: 23 U/L (ref 1–40)
BASOPHILS # BLD AUTO: 0.02 10*3/MM3 (ref 0–0.2)
BASOPHILS NFR BLD AUTO: 0.4 % (ref 0–1.5)
BILIRUB SERPL-MCNC: 0.6 MG/DL (ref 0–1.2)
BUN SERPL-MCNC: 12 MG/DL (ref 8–23)
BUN/CREAT SERPL: 13.8 (ref 7–25)
CALCIUM SPEC-SCNC: 9 MG/DL (ref 8.6–10.5)
CHLORIDE SERPL-SCNC: 104 MMOL/L (ref 98–107)
CK SERPL-CCNC: 81 U/L (ref 20–200)
CO2 SERPL-SCNC: 26 MMOL/L (ref 22–29)
CREAT SERPL-MCNC: 0.87 MG/DL (ref 0.76–1.27)
CRP SERPL-MCNC: 0.27 MG/DL (ref 0–0.5)
D DIMER PPP FEU-MCNC: 0.33 MG/L (FEU) (ref 0–0.5)
DEPRECATED RDW RBC AUTO: 40.4 FL (ref 37–54)
EOSINOPHIL # BLD AUTO: 0.19 10*3/MM3 (ref 0–0.4)
EOSINOPHIL NFR BLD AUTO: 3.5 % (ref 0.3–6.2)
ERYTHROCYTE [DISTWIDTH] IN BLOOD BY AUTOMATED COUNT: 12.8 % (ref 12.3–15.4)
FERRITIN SERPL-MCNC: 76.82 NG/ML (ref 30–400)
GFR SERPL CREATININE-BSD FRML MDRD: 89 ML/MIN/1.73
GLOBULIN UR ELPH-MCNC: 2.5 GM/DL
GLUCOSE SERPL-MCNC: 195 MG/DL (ref 65–99)
HBA1C MFR BLD: 8.6 % (ref 4.8–5.6)
HCT VFR BLD AUTO: 35.9 % (ref 37.5–51)
HGB BLD-MCNC: 12.2 G/DL (ref 13–17.7)
HOLD SPECIMEN: NORMAL
IMM GRANULOCYTES # BLD AUTO: 0.01 10*3/MM3 (ref 0–0.05)
IMM GRANULOCYTES NFR BLD AUTO: 0.2 % (ref 0–0.5)
LDH SERPL-CCNC: 154 U/L (ref 135–225)
LYMPHOCYTES # BLD AUTO: 1.58 10*3/MM3 (ref 0.7–3.1)
LYMPHOCYTES NFR BLD AUTO: 29.1 % (ref 19.6–45.3)
MAGNESIUM SERPL-MCNC: 2.1 MG/DL (ref 1.6–2.4)
MCH RBC QN AUTO: 29.8 PG (ref 26.6–33)
MCHC RBC AUTO-ENTMCNC: 34 G/DL (ref 31.5–35.7)
MCV RBC AUTO: 87.6 FL (ref 79–97)
MONOCYTES # BLD AUTO: 0.48 10*3/MM3 (ref 0.1–0.9)
MONOCYTES NFR BLD AUTO: 8.8 % (ref 5–12)
NEUTROPHILS NFR BLD AUTO: 3.15 10*3/MM3 (ref 1.7–7)
NEUTROPHILS NFR BLD AUTO: 58 % (ref 42.7–76)
NRBC BLD AUTO-RTO: 0 /100 WBC (ref 0–0.2)
NT-PROBNP SERPL-MCNC: 180.3 PG/ML (ref 0–900)
PHOSPHATE SERPL-MCNC: 3.4 MG/DL (ref 2.5–4.5)
PLATELET # BLD AUTO: 103 10*3/MM3 (ref 140–450)
PMV BLD AUTO: 11.2 FL (ref 6–12)
POTASSIUM SERPL-SCNC: 4.3 MMOL/L (ref 3.5–5.2)
PROCALCITONIN SERPL-MCNC: 0.08 NG/ML (ref 0–0.25)
PROT SERPL-MCNC: 6.7 G/DL (ref 6–8.5)
RBC # BLD AUTO: 4.1 10*6/MM3 (ref 4.14–5.8)
SARS-COV-2 RDRP RESP QL NAA+PROBE: DETECTED
SODIUM SERPL-SCNC: 141 MMOL/L (ref 136–145)
TROPONIN T SERPL-MCNC: <0.01 NG/ML (ref 0–0.03)
TROPONIN T SERPL-MCNC: <0.01 NG/ML (ref 0–0.03)
WBC # BLD AUTO: 5.43 10*3/MM3 (ref 3.4–10.8)
WHOLE BLOOD HOLD SPECIMEN: NORMAL
WHOLE BLOOD HOLD SPECIMEN: NORMAL

## 2020-07-26 PROCEDURE — 25010000002 IOPAMIDOL 61 % SOLUTION: Performed by: FAMILY MEDICINE

## 2020-07-26 PROCEDURE — 82728 ASSAY OF FERRITIN: CPT | Performed by: INTERNAL MEDICINE

## 2020-07-26 PROCEDURE — 80053 COMPREHEN METABOLIC PANEL: CPT | Performed by: FAMILY MEDICINE

## 2020-07-26 PROCEDURE — 93010 ELECTROCARDIOGRAM REPORT: CPT | Performed by: INTERNAL MEDICINE

## 2020-07-26 PROCEDURE — 71260 CT THORAX DX C+: CPT

## 2020-07-26 PROCEDURE — 83036 HEMOGLOBIN GLYCOSYLATED A1C: CPT | Performed by: INTERNAL MEDICINE

## 2020-07-26 PROCEDURE — 85025 COMPLETE CBC W/AUTO DIFF WBC: CPT | Performed by: FAMILY MEDICINE

## 2020-07-26 PROCEDURE — 82550 ASSAY OF CK (CPK): CPT | Performed by: INTERNAL MEDICINE

## 2020-07-26 PROCEDURE — 93005 ELECTROCARDIOGRAM TRACING: CPT | Performed by: FAMILY MEDICINE

## 2020-07-26 PROCEDURE — 85379 FIBRIN DEGRADATION QUANT: CPT | Performed by: INTERNAL MEDICINE

## 2020-07-26 PROCEDURE — 71045 X-RAY EXAM CHEST 1 VIEW: CPT

## 2020-07-26 PROCEDURE — 83880 ASSAY OF NATRIURETIC PEPTIDE: CPT | Performed by: INTERNAL MEDICINE

## 2020-07-26 PROCEDURE — 84145 PROCALCITONIN (PCT): CPT | Performed by: INTERNAL MEDICINE

## 2020-07-26 PROCEDURE — 99284 EMERGENCY DEPT VISIT MOD MDM: CPT

## 2020-07-26 PROCEDURE — 84484 ASSAY OF TROPONIN QUANT: CPT | Performed by: FAMILY MEDICINE

## 2020-07-26 PROCEDURE — 83735 ASSAY OF MAGNESIUM: CPT | Performed by: INTERNAL MEDICINE

## 2020-07-26 PROCEDURE — 99285 EMERGENCY DEPT VISIT HI MDM: CPT

## 2020-07-26 PROCEDURE — 86140 C-REACTIVE PROTEIN: CPT | Performed by: INTERNAL MEDICINE

## 2020-07-26 PROCEDURE — 84100 ASSAY OF PHOSPHORUS: CPT | Performed by: INTERNAL MEDICINE

## 2020-07-26 PROCEDURE — 83615 LACTATE (LD) (LDH) ENZYME: CPT | Performed by: INTERNAL MEDICINE

## 2020-07-26 PROCEDURE — 87635 SARS-COV-2 COVID-19 AMP PRB: CPT | Performed by: FAMILY MEDICINE

## 2020-07-26 RX ORDER — NITROGLYCERIN 0.4 MG/1
0.4 TABLET SUBLINGUAL
Status: DISCONTINUED | OUTPATIENT
Start: 2020-07-26 | End: 2020-07-26 | Stop reason: SDUPTHER

## 2020-07-26 RX ORDER — CALCIUM POLYCARBOPHIL 625 MG 625 MG/1
625 TABLET ORAL DAILY
Status: DISCONTINUED | OUTPATIENT
Start: 2020-07-27 | End: 2020-07-27 | Stop reason: HOSPADM

## 2020-07-26 RX ORDER — SODIUM CHLORIDE 0.9 % (FLUSH) 0.9 %
10 SYRINGE (ML) INJECTION AS NEEDED
Status: DISCONTINUED | OUTPATIENT
Start: 2020-07-26 | End: 2020-07-27 | Stop reason: HOSPADM

## 2020-07-26 RX ORDER — ONDANSETRON 4 MG/1
4 TABLET, ORALLY DISINTEGRATING ORAL EVERY 4 HOURS PRN
Status: DISCONTINUED | OUTPATIENT
Start: 2020-07-26 | End: 2020-07-27 | Stop reason: HOSPADM

## 2020-07-26 RX ORDER — SODIUM CHLORIDE 0.9 % (FLUSH) 0.9 %
10 SYRINGE (ML) INJECTION EVERY 12 HOURS SCHEDULED
Status: DISCONTINUED | OUTPATIENT
Start: 2020-07-26 | End: 2020-07-27 | Stop reason: HOSPADM

## 2020-07-26 RX ORDER — LEVETIRACETAM 500 MG/1
1500 TABLET ORAL EVERY 12 HOURS SCHEDULED
Status: DISCONTINUED | OUTPATIENT
Start: 2020-07-26 | End: 2020-07-27 | Stop reason: HOSPADM

## 2020-07-26 RX ORDER — ALBUTEROL SULFATE 2.5 MG/3ML
2.5 SOLUTION RESPIRATORY (INHALATION) EVERY 6 HOURS PRN
Status: DISCONTINUED | OUTPATIENT
Start: 2020-07-26 | End: 2020-07-27 | Stop reason: HOSPADM

## 2020-07-26 RX ORDER — NITROGLYCERIN 0.4 MG/1
0.4 TABLET SUBLINGUAL
Status: DISCONTINUED | OUTPATIENT
Start: 2020-07-26 | End: 2020-07-27 | Stop reason: HOSPADM

## 2020-07-26 RX ORDER — NICOTINE POLACRILEX 4 MG
15 LOZENGE BUCCAL
Status: DISCONTINUED | OUTPATIENT
Start: 2020-07-26 | End: 2020-07-27 | Stop reason: HOSPADM

## 2020-07-26 RX ORDER — ASPIRIN 81 MG/1
243 TABLET, CHEWABLE ORAL ONCE
Status: COMPLETED | OUTPATIENT
Start: 2020-07-26 | End: 2020-07-26

## 2020-07-26 RX ORDER — PANTOPRAZOLE SODIUM 40 MG/1
40 TABLET, DELAYED RELEASE ORAL
Status: DISCONTINUED | OUTPATIENT
Start: 2020-07-27 | End: 2020-07-27 | Stop reason: HOSPADM

## 2020-07-26 RX ORDER — ONDANSETRON 4 MG/1
4 TABLET, FILM COATED ORAL EVERY 6 HOURS PRN
Status: DISCONTINUED | OUTPATIENT
Start: 2020-07-26 | End: 2020-07-27 | Stop reason: HOSPADM

## 2020-07-26 RX ORDER — FLUTICASONE PROPIONATE 50 MCG
2 SPRAY, SUSPENSION (ML) NASAL DAILY
Status: DISCONTINUED | OUTPATIENT
Start: 2020-07-27 | End: 2020-07-27 | Stop reason: HOSPADM

## 2020-07-26 RX ORDER — FUROSEMIDE 20 MG/1
40 TABLET ORAL DAILY PRN
Status: DISCONTINUED | OUTPATIENT
Start: 2020-07-26 | End: 2020-07-27 | Stop reason: HOSPADM

## 2020-07-26 RX ORDER — ASPIRIN 81 MG/1
81 TABLET, CHEWABLE ORAL DAILY
Status: DISCONTINUED | OUTPATIENT
Start: 2020-07-27 | End: 2020-07-27 | Stop reason: HOSPADM

## 2020-07-26 RX ORDER — DEXTROSE MONOHYDRATE 25 G/50ML
25 INJECTION, SOLUTION INTRAVENOUS
Status: DISCONTINUED | OUTPATIENT
Start: 2020-07-26 | End: 2020-07-27 | Stop reason: HOSPADM

## 2020-07-26 RX ORDER — ALBUTEROL SULFATE 90 UG/1
2 AEROSOL, METERED RESPIRATORY (INHALATION) EVERY 6 HOURS PRN
Status: DISCONTINUED | OUTPATIENT
Start: 2020-07-26 | End: 2020-07-26 | Stop reason: CLARIF

## 2020-07-26 RX ORDER — ONDANSETRON 2 MG/ML
4 INJECTION INTRAMUSCULAR; INTRAVENOUS EVERY 6 HOURS PRN
Status: DISCONTINUED | OUTPATIENT
Start: 2020-07-26 | End: 2020-07-27 | Stop reason: HOSPADM

## 2020-07-26 RX ORDER — LAMOTRIGINE 100 MG/1
200 TABLET ORAL 2 TIMES DAILY
Status: DISCONTINUED | OUTPATIENT
Start: 2020-07-26 | End: 2020-07-27 | Stop reason: HOSPADM

## 2020-07-26 RX ORDER — LISINOPRIL 20 MG/1
20 TABLET ORAL DAILY
Status: DISCONTINUED | OUTPATIENT
Start: 2020-07-27 | End: 2020-07-27 | Stop reason: HOSPADM

## 2020-07-26 RX ORDER — NITROGLYCERIN 20 MG/100ML
5-200 INJECTION INTRAVENOUS
Status: DISCONTINUED | OUTPATIENT
Start: 2020-07-26 | End: 2020-07-27 | Stop reason: HOSPADM

## 2020-07-26 RX ADMIN — IOPAMIDOL 100 ML: 612 INJECTION, SOLUTION INTRAVENOUS at 22:10

## 2020-07-26 RX ADMIN — NITROGLYCERIN 5 MCG/MIN: 20 INJECTION INTRAVENOUS at 18:27

## 2020-07-26 RX ADMIN — ASPIRIN 81 MG 243 MG: 81 TABLET ORAL at 17:57

## 2020-07-27 ENCOUNTER — READMISSION MANAGEMENT (OUTPATIENT)
Dept: CALL CENTER | Facility: HOSPITAL | Age: 60
End: 2020-07-27

## 2020-07-27 VITALS
RESPIRATION RATE: 18 BRPM | TEMPERATURE: 97.4 F | OXYGEN SATURATION: 99 % | WEIGHT: 252 LBS | HEART RATE: 71 BPM | DIASTOLIC BLOOD PRESSURE: 75 MMHG | HEIGHT: 72 IN | SYSTOLIC BLOOD PRESSURE: 122 MMHG | BODY MASS INDEX: 34.13 KG/M2

## 2020-07-27 LAB
ALBUMIN SERPL-MCNC: 4 G/DL (ref 3.5–5.2)
ALBUMIN/GLOB SERPL: 1.7 G/DL
ALP SERPL-CCNC: 86 U/L (ref 39–117)
ALT SERPL W P-5'-P-CCNC: 29 U/L (ref 1–41)
ANION GAP SERPL CALCULATED.3IONS-SCNC: 10 MMOL/L (ref 5–15)
AST SERPL-CCNC: 21 U/L (ref 1–40)
B PARAPERT DNA SPEC QL NAA+PROBE: NOT DETECTED
B PERT DNA SPEC QL NAA+PROBE: NOT DETECTED
BASOPHILS # BLD AUTO: 0.03 10*3/MM3 (ref 0–0.2)
BASOPHILS NFR BLD AUTO: 0.6 % (ref 0–1.5)
BILIRUB SERPL-MCNC: 0.6 MG/DL (ref 0–1.2)
BUN SERPL-MCNC: 12 MG/DL (ref 8–23)
BUN/CREAT SERPL: 11.4 (ref 7–25)
C PNEUM DNA NPH QL NAA+NON-PROBE: NOT DETECTED
CALCIUM SPEC-SCNC: 9 MG/DL (ref 8.6–10.5)
CHLORIDE SERPL-SCNC: 101 MMOL/L (ref 98–107)
CHOLEST SERPL-MCNC: 99 MG/DL (ref 0–200)
CO2 SERPL-SCNC: 26 MMOL/L (ref 22–29)
CREAT SERPL-MCNC: 1.05 MG/DL (ref 0.76–1.27)
DEPRECATED RDW RBC AUTO: 40.4 FL (ref 37–54)
EOSINOPHIL # BLD AUTO: 0.19 10*3/MM3 (ref 0–0.4)
EOSINOPHIL NFR BLD AUTO: 3.6 % (ref 0.3–6.2)
ERYTHROCYTE [DISTWIDTH] IN BLOOD BY AUTOMATED COUNT: 12.7 % (ref 12.3–15.4)
FERRITIN SERPL-MCNC: 74.82 NG/ML (ref 30–400)
FLUAV H1 2009 PAND RNA NPH QL NAA+PROBE: NOT DETECTED
FLUAV H1 HA GENE NPH QL NAA+PROBE: NOT DETECTED
FLUAV H3 RNA NPH QL NAA+PROBE: NOT DETECTED
FLUAV SUBTYP SPEC NAA+PROBE: NOT DETECTED
FLUBV RNA ISLT QL NAA+PROBE: NOT DETECTED
GFR SERPL CREATININE-BSD FRML MDRD: 72 ML/MIN/1.73
GLOBULIN UR ELPH-MCNC: 2.4 GM/DL
GLUCOSE BLDC GLUCOMTR-MCNC: 230 MG/DL (ref 70–130)
GLUCOSE SERPL-MCNC: 276 MG/DL (ref 65–99)
HADV DNA SPEC NAA+PROBE: NOT DETECTED
HCOV 229E RNA SPEC QL NAA+PROBE: NOT DETECTED
HCOV HKU1 RNA SPEC QL NAA+PROBE: NOT DETECTED
HCOV NL63 RNA SPEC QL NAA+PROBE: NOT DETECTED
HCOV OC43 RNA SPEC QL NAA+PROBE: NOT DETECTED
HCT VFR BLD AUTO: 35.6 % (ref 37.5–51)
HDLC SERPL-MCNC: 24 MG/DL (ref 40–60)
HGB BLD-MCNC: 12.1 G/DL (ref 13–17.7)
HMPV RNA NPH QL NAA+NON-PROBE: NOT DETECTED
HPIV1 RNA SPEC QL NAA+PROBE: NOT DETECTED
HPIV2 RNA SPEC QL NAA+PROBE: NOT DETECTED
HPIV3 RNA NPH QL NAA+PROBE: NOT DETECTED
HPIV4 P GENE NPH QL NAA+PROBE: NOT DETECTED
IMM GRANULOCYTES # BLD AUTO: 0.02 10*3/MM3 (ref 0–0.05)
IMM GRANULOCYTES NFR BLD AUTO: 0.4 % (ref 0–0.5)
LDLC SERPL CALC-MCNC: 24 MG/DL (ref 0–100)
LDLC/HDLC SERPL: 1.01 {RATIO}
LYMPHOCYTES # BLD AUTO: 1.82 10*3/MM3 (ref 0.7–3.1)
LYMPHOCYTES NFR BLD AUTO: 34.7 % (ref 19.6–45.3)
M PNEUMO IGG SER IA-ACNC: NOT DETECTED
MCH RBC QN AUTO: 29.7 PG (ref 26.6–33)
MCHC RBC AUTO-ENTMCNC: 34 G/DL (ref 31.5–35.7)
MCV RBC AUTO: 87.3 FL (ref 79–97)
MONOCYTES # BLD AUTO: 0.48 10*3/MM3 (ref 0.1–0.9)
MONOCYTES NFR BLD AUTO: 9.1 % (ref 5–12)
NEUTROPHILS NFR BLD AUTO: 2.71 10*3/MM3 (ref 1.7–7)
NEUTROPHILS NFR BLD AUTO: 51.6 % (ref 42.7–76)
NRBC BLD AUTO-RTO: 0 /100 WBC (ref 0–0.2)
PLATELET # BLD AUTO: 88 10*3/MM3 (ref 140–450)
PMV BLD AUTO: 11.2 FL (ref 6–12)
POTASSIUM SERPL-SCNC: 4 MMOL/L (ref 3.5–5.2)
PROCALCITONIN SERPL-MCNC: 0.05 NG/ML (ref 0–0.25)
PROT SERPL-MCNC: 6.4 G/DL (ref 6–8.5)
RBC # BLD AUTO: 4.08 10*6/MM3 (ref 4.14–5.8)
RHINOVIRUS RNA SPEC NAA+PROBE: DETECTED
RSV RNA NPH QL NAA+NON-PROBE: NOT DETECTED
SODIUM SERPL-SCNC: 137 MMOL/L (ref 136–145)
TRIGL SERPL-MCNC: 254 MG/DL (ref 0–150)
TROPONIN T SERPL-MCNC: <0.01 NG/ML (ref 0–0.03)
VLDLC SERPL-MCNC: 50.8 MG/DL
WBC # BLD AUTO: 5.25 10*3/MM3 (ref 3.4–10.8)

## 2020-07-27 PROCEDURE — 80053 COMPREHEN METABOLIC PANEL: CPT | Performed by: INTERNAL MEDICINE

## 2020-07-27 PROCEDURE — 99252 IP/OBS CONSLTJ NEW/EST SF 35: CPT | Performed by: NURSE PRACTITIONER

## 2020-07-27 PROCEDURE — 82728 ASSAY OF FERRITIN: CPT | Performed by: INTERNAL MEDICINE

## 2020-07-27 PROCEDURE — 85025 COMPLETE CBC W/AUTO DIFF WBC: CPT | Performed by: INTERNAL MEDICINE

## 2020-07-27 PROCEDURE — 80061 LIPID PANEL: CPT | Performed by: INTERNAL MEDICINE

## 2020-07-27 PROCEDURE — 93010 ELECTROCARDIOGRAM REPORT: CPT | Performed by: INTERNAL MEDICINE

## 2020-07-27 PROCEDURE — 84484 ASSAY OF TROPONIN QUANT: CPT | Performed by: INTERNAL MEDICINE

## 2020-07-27 PROCEDURE — 0100U HC BIOFIRE FILMARRAY RESP PANEL 2: CPT | Performed by: INTERNAL MEDICINE

## 2020-07-27 PROCEDURE — 63710000001 INSULIN DETEMIR PER 5 UNITS: Performed by: INTERNAL MEDICINE

## 2020-07-27 PROCEDURE — 84145 PROCALCITONIN (PCT): CPT | Performed by: INTERNAL MEDICINE

## 2020-07-27 PROCEDURE — 93005 ELECTROCARDIOGRAM TRACING: CPT | Performed by: INTERNAL MEDICINE

## 2020-07-27 PROCEDURE — 82962 GLUCOSE BLOOD TEST: CPT

## 2020-07-27 RX ORDER — ISOSORBIDE MONONITRATE 30 MG/1
30 TABLET, EXTENDED RELEASE ORAL
Status: DISCONTINUED | OUTPATIENT
Start: 2020-07-27 | End: 2020-07-27 | Stop reason: HOSPADM

## 2020-07-27 RX ORDER — ISOSORBIDE MONONITRATE 30 MG/1
30 TABLET, EXTENDED RELEASE ORAL
Qty: 30 TABLET | Refills: 0 | Status: ON HOLD | OUTPATIENT
Start: 2020-07-28 | End: 2021-04-30 | Stop reason: SDUPTHER

## 2020-07-27 RX ADMIN — MUPIROCIN 1 APPLICATION: 20 OINTMENT TOPICAL at 00:31

## 2020-07-27 RX ADMIN — APIXABAN 5 MG: 5 TABLET, FILM COATED ORAL at 00:22

## 2020-07-27 RX ADMIN — INSULIN DETEMIR 65 UNITS: 100 INJECTION, SOLUTION SUBCUTANEOUS at 00:33

## 2020-07-27 RX ADMIN — LAMOTRIGINE 200 MG: 100 TABLET ORAL at 09:59

## 2020-07-27 RX ADMIN — MUPIROCIN 1 APPLICATION: 20 OINTMENT TOPICAL at 10:00

## 2020-07-27 RX ADMIN — ASPIRIN 81 MG: 81 TABLET, CHEWABLE ORAL at 09:59

## 2020-07-27 RX ADMIN — LEVETIRACETAM 1500 MG: 500 TABLET, FILM COATED ORAL at 09:58

## 2020-07-27 RX ADMIN — APIXABAN 5 MG: 5 TABLET, FILM COATED ORAL at 09:59

## 2020-07-27 RX ADMIN — CALCIUM POLYCARBOPHIL 625 MG: 625 TABLET, FILM COATED ORAL at 10:01

## 2020-07-27 RX ADMIN — FLUTICASONE PROPIONATE 2 SPRAY: 50 SPRAY, METERED NASAL at 10:00

## 2020-07-27 RX ADMIN — SODIUM CHLORIDE, PRESERVATIVE FREE 10 ML: 5 INJECTION INTRAVENOUS at 10:02

## 2020-07-27 RX ADMIN — LISINOPRIL 20 MG: 20 TABLET ORAL at 09:59

## 2020-07-27 RX ADMIN — PANTOPRAZOLE SODIUM 40 MG: 40 TABLET, DELAYED RELEASE ORAL at 10:08

## 2020-07-27 RX ADMIN — LEVETIRACETAM 1500 MG: 500 TABLET, FILM COATED ORAL at 00:23

## 2020-07-27 RX ADMIN — LAMOTRIGINE 100 MG: 100 TABLET ORAL at 00:22

## 2020-07-27 RX ADMIN — ISOSORBIDE MONONITRATE 30 MG: 30 TABLET ORAL at 12:23

## 2020-07-27 RX ADMIN — SODIUM CHLORIDE, PRESERVATIVE FREE 10 ML: 5 INJECTION INTRAVENOUS at 01:01

## 2020-07-28 ENCOUNTER — READMISSION MANAGEMENT (OUTPATIENT)
Dept: CALL CENTER | Facility: HOSPITAL | Age: 60
End: 2020-07-28

## 2020-07-28 NOTE — OUTREACH NOTE
Prep Survey      Responses   [facility] from?  [place]   Is LACE score < 7 ?  No   Eligibility  Readm Mgmt   Discharge diagnosis  Chest pain-Covid 19   COVID-19 Test Status  Confirmed   Does the patient have one of the following disease processes/diagnoses(primary or secondary)?  Other   Does the patient have Home health ordered?  No   Is there a DME ordered?  No   Prep survey completed?  Yes          Erma Neumann RN

## 2020-07-28 NOTE — OUTREACH NOTE
Medical Week 1 Survey      Responses   [facility] patient discharged from?  [place]   COVID-19 Test Status  Confirmed   Does the patient have one of the following disease processes/diagnoses(primary or secondary)?  Other   Is there a successful TCM telephone encounter documented?  No   Week 1 attempt successful?  Yes   Call start time  1058   Call end time  1107   Is patient permission given to speak with other caregiver?  Yes   List who call center can speak with  Power Martino reviewed with patient/caregiver?  Yes   Is the patient having any side effects they believe may be caused by any medication additions or changes?  No   Does the patient have all medications ordered at discharge?  Yes   Is the patient taking all medications as directed (includes completed medication regime)?  Yes   Does the patient have a primary care provider?   Yes   Does the patient have an appointment with their PCP within 7 days of discharge?  No   What is preventing the patient from scheduling follow up appointments within 7 days of discharge?  Haven't had time   Nursing Interventions  Advised patient to make appointment, Educated patient on importance of making appointment   Has the patient kept scheduled appointments due by today?  N/A   Comments  States will call PCP office today for appt.   Has home health visited the patient within 72 hours of discharge?  N/A   Pulse Ox monitoring  None   Psychosocial issues?  No   Did the patient receive a copy of their discharge instructions?  Yes   Nursing interventions  Reviewed instructions with patient   What is the patient's perception of their health status since discharge?  Same   Is the patient/caregiver able to teach back signs and symptoms related to disease process for when to call PCP?  Yes   Is the patient/caregiver able to teach back signs and symptoms related to disease process for when to call 911?  Yes   Is the patient/caregiver able to teach back the hierarchy of who  to call/visit for symptoms/problems? PCP, Specialist, Home health nurse, Urgent Care, ED, 911  Yes   Additional teach back comments  Reviewed quarantine-encouraged to replace any item in contact with mucous membranes, sanitize household surfaces, check temp BID. Reviewed cardiac s/s.   Week 1 call completed?  Yes   Wrap up additional comments  STates is feeling about the same as yesterday. Denies any cardiac s/s. Denies any cough, SOA, chest pain, or fever. Continues with quarantine. STates will have stress test or heart cath at end of 14 day quarantine. Denies any needs today.          Yesica Claros, RN

## 2020-07-29 ENCOUNTER — READMISSION MANAGEMENT (OUTPATIENT)
Dept: CALL CENTER | Facility: HOSPITAL | Age: 60
End: 2020-07-29

## 2020-07-29 NOTE — OUTREACH NOTE
Medical Week 1 Survey      Responses   [facility] patient discharged from?  [place]   COVID-19 Test Status  Confirmed   Does the patient have one of the following disease processes/diagnoses(primary or secondary)?  Other   Is there a successful TCM telephone encounter documented?  No   Week 1 attempt successful?  Yes   Call start time  1430   Call end time  1436   Discharge diagnosis  Chest pain-Covid 19   Meds reviewed with patient/caregiver?  Yes   Is the patient having any side effects they believe may be caused by any medication additions or changes?  No   Does the patient have all medications ordered at discharge?  Yes   Is the patient taking all medications as directed (includes completed medication regime)?  Yes   Does the patient have a primary care provider?   Yes   Does the patient have an appointment with their PCP within 7 days of discharge?  No   What is preventing the patient from scheduling follow up appointments within 7 days of discharge?  Haven't had time   Nursing Interventions  Advised patient to make appointment, Educated patient on importance of making appointment   Has the patient kept scheduled appointments due by today?  N/A   Has home health visited the patient within 72 hours of discharge?  N/A   Pulse Ox monitoring  None   Psychosocial issues?  No   Comments  97.4 temp this am.   Did the patient receive a copy of their discharge instructions?  Yes   Nursing interventions  Reviewed instructions with patient   What is the patient's perception of their health status since discharge?  Improving   Is the patient/caregiver able to teach back signs and symptoms related to disease process for when to call PCP?  Yes   Is the patient/caregiver able to teach back signs and symptoms related to disease process for when to call 911?  Yes   Is the patient/caregiver able to teach back the hierarchy of who to call/visit for symptoms/problems? PCP, Specialist, Home health nurse, Urgent Care, ED, 911   Yes   Additional teach back comments  Encouraged to make an appt for PCP this week.  He changed his toothbrush, he has no issues at this time.  Denies cough or SOA today.   Week 1 call completed?  Yes   Wrap up additional comments  Says he is walking out in the yard some every morning.          Vida Pope RN

## 2020-07-30 ENCOUNTER — READMISSION MANAGEMENT (OUTPATIENT)
Dept: CALL CENTER | Facility: HOSPITAL | Age: 60
End: 2020-07-30

## 2020-07-30 ENCOUNTER — TELEPHONE (OUTPATIENT)
Dept: GASTROENTEROLOGY | Facility: CLINIC | Age: 60
End: 2020-07-30

## 2020-07-30 NOTE — OUTREACH NOTE
Medical Week 1 Survey      Responses   [facility] [place] patient discharged from?  [place]   COVID-19 Test Status  Confirmed   Does the patient have one of the following disease processes/diagnoses(primary or secondary)?  Other   Is there a successful TCM telephone encounter documented?  No   Week 1 attempt successful?  Yes   Call start time  1350   Call end time  1352   Discharge diagnosis  Chest pain-Covid 19   Is patient permission given to speak with other caregiver?  Yes   List who call center can speak with  wife-Daniela Abbasis reviewed with patient/caregiver?  Yes   Is the patient having any side effects they believe may be caused by any medication additions or changes?  No   Does the patient have all medications ordered at discharge?  Yes   Is the patient taking all medications as directed (includes completed medication regime)?  Yes   Does the patient have a primary care provider?   Yes   Does the patient have an appointment with their PCP within 7 days of discharge?  N/A   Has the patient kept scheduled appointments due by today?  N/A   Pulse Ox monitoring  None   Psychosocial issues?  No   Did the patient receive a copy of their discharge instructions?  Yes   Nursing interventions  Reviewed instructions with patient   What is the patient's perception of their health status since discharge?  Improving   Is the patient/caregiver able to teach back signs and symptoms related to disease process for when to call PCP?  Yes   Is the patient/caregiver able to teach back signs and symptoms related to disease process for when to call 911?  Yes   Is the patient/caregiver able to teach back the hierarchy of who to call/visit for symptoms/problems? PCP, Specialist, Home health nurse, Urgent Care, ED, 911  Yes   Week 1 call completed?  Yes          Yumiko Gutiérrez RN

## 2020-08-02 ENCOUNTER — READMISSION MANAGEMENT (OUTPATIENT)
Dept: CALL CENTER | Facility: HOSPITAL | Age: 60
End: 2020-08-02

## 2020-08-02 NOTE — OUTREACH NOTE
Medical Week 1 Survey      Responses   [facility] patient discharged from?  [place]   COVID-19 Test Status  Confirmed   Does the patient have one of the following disease processes/diagnoses(primary or secondary)?  Other   Is there a successful TCM telephone encounter documented?  No   Week 1 attempt successful?  No          Gerardo Gray RN

## 2020-08-05 ENCOUNTER — READMISSION MANAGEMENT (OUTPATIENT)
Dept: CALL CENTER | Facility: HOSPITAL | Age: 60
End: 2020-08-05

## 2020-08-05 NOTE — OUTREACH NOTE
Medical Week 2 Survey      Responses   [facility] patient discharged from?  [place]   COVID-19 Test Status  Confirmed   Does the patient have one of the following disease processes/diagnoses(primary or secondary)?  Other   Week 2 attempt successful?  Yes   Call start time  1508   Discharge diagnosis  Chest pain-Covid 19   Call end time  1511   Meds reviewed with patient/caregiver?  Yes   Is the patient having any side effects they believe may be caused by any medication additions or changes?  No   Does the patient have all medications ordered at discharge?  Yes   Is the patient taking all medications as directed (includes completed medication regime)?  Yes   Does the patient have a primary care provider?   Yes   Has the patient kept scheduled appointments due by today?  N/A   Comments  Declines need for f/u   Has home health visited the patient within 72 hours of discharge?  N/A   Pulse Ox monitoring  None   Psychosocial issues?  No   Did the patient receive a copy of their discharge instructions?  Yes   Nursing interventions  Reviewed instructions with patient   What is the patient's perception of their health status since discharge?  Improving   Is the patient/caregiver able to teach back signs and symptoms related to disease process for when to call PCP?  Yes   Is the patient/caregiver able to teach back signs and symptoms related to disease process for when to call 911?  Yes   Is the patient/caregiver able to teach back the hierarchy of who to call/visit for symptoms/problems? PCP, Specialist, Home health nurse, Urgent Care, ED, 911  Yes   Additional teach back comments  He is not going to the Dr. as asymptomatic.  No cough, SOa or fever.  Says quarantine ends Monday.   Week 2 Call Completed?  Yes   Wrap up additional comments  Improving.          Vida Pope RN

## 2020-08-06 ENCOUNTER — NURSE TRIAGE (OUTPATIENT)
Dept: CALL CENTER | Facility: HOSPITAL | Age: 60
End: 2020-08-06

## 2020-08-06 NOTE — TELEPHONE ENCOUNTER
"He tested positive on 7/26 he had no symptoms, now has diarrhea, headache, vomiting no shortness of breath or fever or cough, no distress he states, he is high risk pt. Asthma,and heart disease, told if any distress to be seen in ER    Reason for Disposition  • HIGH RISK patient (e.g., age > 64 years, diabetes, heart or lung disease, weak immune system)    Additional Information  • Negative: SEVERE difficulty breathing (e.g., struggling for each breath, speaks in single words)  • Negative: Difficult to awaken or acting confused (e.g., disoriented, slurred speech)  • Negative: Bluish (or gray) lips or face now  • Negative: Shock suspected (e.g., cold/pale/clammy skin, too weak to stand, low BP, rapid pulse)  • Negative: Sounds like a life-threatening emergency to the triager  • Negative: [1] COVID-19 exposure AND [2] no symptoms  • Negative: COVID-19 and Breastfeeding, questions about  • Negative: [1] Adult with possible COVID-19 symptoms AND [2] triager concerned about severity of symptoms or other causes  • Negative: SEVERE or constant chest pain or pressure (Exception: mild central chest pain, present only when coughing)  • Negative: MODERATE difficulty breathing (e.g., speaks in phrases, SOB even at rest, pulse 100-120)  • Negative: Patient sounds very sick or weak to the triager  • Negative: MILD difficulty breathing (e.g., minimal/no SOB at rest, SOB with walking, pulse <100)  • Negative: Chest pain or pressure  • Negative: Fever > 103 F (39.4 C)  • Negative: [1] Fever > 101 F (38.3 C) AND [2] age > 60  • Negative: [1] Fever > 100.0 F (37.8 C) AND [2] bedridden (e.g., nursing home patient, CVA, chronic illness, recovering from surgery)  • Negative: Fever present > 3 days (72 hours)    Answer Assessment - Initial Assessment Questions  1. COVID-19 DIAGNOSIS: \"Who made your Coronavirus (COVID-19) diagnosis?\" \"Was it confirmed by a positive lab test?\" If not diagnosed by a HCP, ask \"Are there lots of cases " "(community spread) where you live?\" (See public health department website, if unsure)      [facility] tested positive 7/262  2. ONSET: \"When did the COVID-19 symptoms start?\"        8/6 symptoms  3. WORST SYMPTOM: \"What is your worst symptom?\" (e.g., cough, fever, shortness of breath, muscle aches)      diarrhea  4. COUGH: \"Do you have a cough?\" If so, ask: \"How bad is the cough?\"        no  5. FEVER: \"Do you have a fever?\" If so, ask: \"What is your temperature, how was it measured, and when did it start?\"      no  6. RESPIRATORY STATUS: \"Describe your breathing?\" (e.g., shortness of breath, wheezing, unable to speak)       no  7. BETTER-SAME-WORSE: \"Are you getting better, staying the same or getting worse compared to yesterday?\"  If getting worse, ask, \"In what way?\"      Worse is diarrhea and headache  8. HIGH RISK DISEASE: \"Do you have any chronic medical problems?\" (e.g., asthma, heart or lung disease, weak immune system, etc.)      Asthma/Heart/Asthma, open heart surgery, stroke  9. PREGNANCY: \"Is there any chance you are pregnant?\" \"When was your last menstrual period?\"      no  10. OTHER SYMPTOMS: \"Do you have any other symptoms?\"  (e.g., chills, fatigue, headache, loss of smell or taste, muscle pain, sore throat)        Headache, fatigue,vomiting, diarrhea    Protocols used: CORONAVIRUS (COVID-19) DIAGNOSED OR SUSPECTED-ADULT-AH      "

## 2020-08-07 ENCOUNTER — APPOINTMENT (OUTPATIENT)
Dept: GENERAL RADIOLOGY | Facility: HOSPITAL | Age: 60
End: 2020-08-07

## 2020-08-07 ENCOUNTER — HOSPITAL ENCOUNTER (EMERGENCY)
Facility: HOSPITAL | Age: 60
Discharge: HOME | End: 2020-08-07
Attending: EMERGENCY MEDICINE | Admitting: EMERGENCY MEDICINE

## 2020-08-07 ENCOUNTER — APPOINTMENT (OUTPATIENT)
Dept: CT IMAGING | Facility: HOSPITAL | Age: 60
End: 2020-08-07

## 2020-08-07 VITALS
BODY MASS INDEX: 33.59 KG/M2 | OXYGEN SATURATION: 95 % | HEART RATE: 67 BPM | HEIGHT: 72 IN | WEIGHT: 248 LBS | DIASTOLIC BLOOD PRESSURE: 89 MMHG | TEMPERATURE: 97.8 F | RESPIRATION RATE: 18 BRPM | SYSTOLIC BLOOD PRESSURE: 142 MMHG

## 2020-08-07 DIAGNOSIS — R19.7 DIARRHEA, UNSPECIFIED TYPE: ICD-10-CM

## 2020-08-07 DIAGNOSIS — R06.02 SHORTNESS OF BREATH: Primary | ICD-10-CM

## 2020-08-07 LAB
ALBUMIN SERPL-MCNC: 4.5 G/DL (ref 3.5–5.2)
ALBUMIN/GLOB SERPL: 1.6 G/DL
ALP SERPL-CCNC: 107 U/L (ref 39–117)
ALT SERPL W P-5'-P-CCNC: 26 U/L (ref 1–41)
ANION GAP SERPL CALCULATED.3IONS-SCNC: 15 MMOL/L (ref 5–15)
ANISOCYTOSIS BLD QL: NORMAL
ARTERIAL PATENCY WRIST A: ABNORMAL
AST SERPL-CCNC: 19 U/L (ref 1–40)
ATMOSPHERIC PRESS: 753 MMHG
BASE EXCESS BLDA CALC-SCNC: -2.4 MMOL/L (ref 0–2)
BASOPHILS # BLD MANUAL: 0.05 10*3/MM3 (ref 0–0.2)
BASOPHILS NFR BLD AUTO: 1 % (ref 0–1.5)
BDY SITE: ABNORMAL
BILIRUB SERPL-MCNC: 0.7 MG/DL (ref 0–1.2)
BODY TEMPERATURE: 37 C
BUN SERPL-MCNC: 21 MG/DL (ref 8–23)
BUN/CREAT SERPL: 17.1 (ref 7–25)
CALCIUM SPEC-SCNC: 10.1 MG/DL (ref 8.6–10.5)
CHLORIDE SERPL-SCNC: 102 MMOL/L (ref 98–107)
CO2 SERPL-SCNC: 21 MMOL/L (ref 22–29)
CREAT SERPL-MCNC: 1.23 MG/DL (ref 0.76–1.27)
D-LACTATE SERPL-SCNC: 1.3 MMOL/L (ref 0.5–2)
D-LACTATE SERPL-SCNC: 2.7 MMOL/L (ref 0.5–2)
DEPRECATED RDW RBC AUTO: 41.1 FL (ref 37–54)
EOSINOPHIL # BLD MANUAL: 0.05 10*3/MM3 (ref 0–0.4)
EOSINOPHIL NFR BLD MANUAL: 1 % (ref 0.3–6.2)
ERYTHROCYTE [DISTWIDTH] IN BLOOD BY AUTOMATED COUNT: 13 % (ref 12.3–15.4)
GFR SERPL CREATININE-BSD FRML MDRD: 60 ML/MIN/1.73
GIANT PLATELETS: NORMAL
GLOBULIN UR ELPH-MCNC: 2.8 GM/DL
GLUCOSE SERPL-MCNC: 202 MG/DL (ref 65–99)
HCO3 BLDA-SCNC: 21.9 MMOL/L (ref 20–26)
HCT VFR BLD AUTO: 39.8 % (ref 37.5–51)
HGB BLD-MCNC: 13.6 G/DL (ref 13–17.7)
HOLD SPECIMEN: NORMAL
HOLD SPECIMEN: NORMAL
LACTATE HOLD SPECIMEN: NORMAL
LIPASE SERPL-CCNC: 35 U/L (ref 13–60)
LYMPHOCYTES # BLD MANUAL: 1.01 10*3/MM3 (ref 0.7–3.1)
LYMPHOCYTES NFR BLD MANUAL: 19.6 % (ref 19.6–45.3)
LYMPHOCYTES NFR BLD MANUAL: 5.2 % (ref 5–12)
Lab: ABNORMAL
MCH RBC QN AUTO: 29.7 PG (ref 26.6–33)
MCHC RBC AUTO-ENTMCNC: 34.2 G/DL (ref 31.5–35.7)
MCV RBC AUTO: 86.9 FL (ref 79–97)
MICROCYTES BLD QL: NORMAL
MODALITY: ABNORMAL
MONOCYTES # BLD AUTO: 0.27 10*3/MM3 (ref 0.1–0.9)
NEUTROPHILS # BLD AUTO: 3.71 10*3/MM3 (ref 1.7–7)
NEUTROPHILS NFR BLD MANUAL: 71.1 % (ref 42.7–76)
NEUTS BAND NFR BLD MANUAL: 1 % (ref 0–5)
NT-PROBNP SERPL-MCNC: 212 PG/ML (ref 0–900)
PCO2 BLDA: 35.8 MM HG (ref 35–45)
PCO2 TEMP ADJ BLD: 35.8 MM HG (ref 35–45)
PH BLDA: 7.39 PH UNITS (ref 7.35–7.45)
PH, TEMP CORRECTED: 7.39 PH UNITS (ref 7.35–7.45)
PLATELET # BLD AUTO: 140 10*3/MM3 (ref 140–450)
PMV BLD AUTO: 11 FL (ref 6–12)
PO2 BLDA: 95.1 MM HG (ref 83–108)
PO2 TEMP ADJ BLD: 95.1 MM HG (ref 83–108)
POLYCHROMASIA BLD QL SMEAR: NORMAL
POTASSIUM SERPL-SCNC: 4.4 MMOL/L (ref 3.5–5.2)
PROT SERPL-MCNC: 7.3 G/DL (ref 6–8.5)
RBC # BLD AUTO: 4.58 10*6/MM3 (ref 4.14–5.8)
SAO2 % BLDCOA: 96.1 % (ref 94–99)
SMALL PLATELETS BLD QL SMEAR: NORMAL
SODIUM SERPL-SCNC: 138 MMOL/L (ref 136–145)
TROPONIN T SERPL-MCNC: <0.01 NG/ML (ref 0–0.03)
VARIANT LYMPHS NFR BLD MANUAL: 1 % (ref 0–5)
VENTILATOR MODE: ABNORMAL
WBC # BLD AUTO: 5.14 10*3/MM3 (ref 3.4–10.8)
WBC MORPH BLD: NORMAL
WHOLE BLOOD HOLD SPECIMEN: NORMAL

## 2020-08-07 PROCEDURE — 84484 ASSAY OF TROPONIN QUANT: CPT | Performed by: EMERGENCY MEDICINE

## 2020-08-07 PROCEDURE — 85007 BL SMEAR W/DIFF WBC COUNT: CPT | Performed by: EMERGENCY MEDICINE

## 2020-08-07 PROCEDURE — 93005 ELECTROCARDIOGRAM TRACING: CPT | Performed by: EMERGENCY MEDICINE

## 2020-08-07 PROCEDURE — 71045 X-RAY EXAM CHEST 1 VIEW: CPT

## 2020-08-07 PROCEDURE — 85025 COMPLETE CBC W/AUTO DIFF WBC: CPT | Performed by: EMERGENCY MEDICINE

## 2020-08-07 PROCEDURE — 82803 BLOOD GASES ANY COMBINATION: CPT

## 2020-08-07 PROCEDURE — 83880 ASSAY OF NATRIURETIC PEPTIDE: CPT | Performed by: EMERGENCY MEDICINE

## 2020-08-07 PROCEDURE — 99284 EMERGENCY DEPT VISIT MOD MDM: CPT

## 2020-08-07 PROCEDURE — 83690 ASSAY OF LIPASE: CPT | Performed by: EMERGENCY MEDICINE

## 2020-08-07 PROCEDURE — 83605 ASSAY OF LACTIC ACID: CPT | Performed by: EMERGENCY MEDICINE

## 2020-08-07 PROCEDURE — 25010000002 IOPAMIDOL 61 % SOLUTION: Performed by: EMERGENCY MEDICINE

## 2020-08-07 PROCEDURE — 71275 CT ANGIOGRAPHY CHEST: CPT

## 2020-08-07 PROCEDURE — 36600 WITHDRAWAL OF ARTERIAL BLOOD: CPT

## 2020-08-07 PROCEDURE — 93010 ELECTROCARDIOGRAM REPORT: CPT | Performed by: INTERNAL MEDICINE

## 2020-08-07 PROCEDURE — 80053 COMPREHEN METABOLIC PANEL: CPT | Performed by: EMERGENCY MEDICINE

## 2020-08-07 RX ORDER — SODIUM CHLORIDE 0.9 % (FLUSH) 0.9 %
10 SYRINGE (ML) INJECTION AS NEEDED
Status: DISCONTINUED | OUTPATIENT
Start: 2020-08-07 | End: 2020-08-07 | Stop reason: HOSPADM

## 2020-08-07 RX ORDER — ACETAMINOPHEN 500 MG
1000 TABLET ORAL ONCE
Status: COMPLETED | OUTPATIENT
Start: 2020-08-07 | End: 2020-08-07

## 2020-08-07 RX ADMIN — SODIUM CHLORIDE, POTASSIUM CHLORIDE, SODIUM LACTATE AND CALCIUM CHLORIDE 1000 ML: 600; 310; 30; 20 INJECTION, SOLUTION INTRAVENOUS at 15:01

## 2020-08-07 RX ADMIN — ACETAMINOPHEN 1000 MG: 500 TABLET, FILM COATED ORAL at 15:01

## 2020-08-07 RX ADMIN — SODIUM CHLORIDE, POTASSIUM CHLORIDE, SODIUM LACTATE AND CALCIUM CHLORIDE 500 ML: 600; 310; 30; 20 INJECTION, SOLUTION INTRAVENOUS at 16:47

## 2020-08-07 RX ADMIN — IOPAMIDOL 100 ML: 612 INJECTION, SOLUTION INTRAVENOUS at 15:20

## 2020-08-07 NOTE — ED PROVIDER NOTES
Subjective   The is a pleasant 60 year old gentleman who presents to the ED with a CC of SOB. He states that about 10 days ago he was admitted to this hospital for chest pain. He had a reassuring workup at that time but was tested for COVID-19 and was positive but was asymptomatic at that time. However, about 24 hours ago he developed shortness of breath and diarrhea. This was all gradual it onset. It is now constant, moderate, no exacerbating or relieving factors and no associated symptoms. He has no chest pain at this time, no back pain, numbness, weakness, or paresthesias. He has no abdominal pain, nausea, vomiting, fevers, or chills. He denies blood in his diarrhea. He has no dysuria or hematuria.       PMH: CAD, DM  Social: denies tobacco use.       History provided by:  Patient      Review of Systems   All other systems reviewed and are negative.      Past Medical History:   Diagnosis Date   • Arthritis    • Asthma    • Cancer (CMS/HCC)    • Carotid disease, bilateral (CMS/HCC)    • Chest pain    • Chronic ischemic heart disease    • Chronic sinusitis    • Maribell bullosa    • Coronary artery disease    • Deviated septum    • Diabetes mellitus (CMS/HCC)    • Difficulty urinating    • Diverticulitis    • Enlarged prostate    • Fatty liver    • GERD (gastroesophageal reflux disease)    • Heart disease    • Hyperlipidemia    • Hypertension    • Hypertrophy of nasal turbinates    • Keratoderma    • Kidney stone    • Migraine    • Murmur, heart    • Myocardial infarction (CMS/HCC)    • Obesity    • PONV (postoperative nausea and vomiting)    • Psoriasis    • Seizures (CMS/HCC)    • Sinus congestion    • Skin cancer    • Sleep apnea    • SOB (shortness of breath)    • Stroke (CMS/HCC)    • UTI (urinary tract infection)        Allergies   Allergen Reactions   • Flagyl [Metronidazole] Hives   • Atorvastatin Other (See Comments)     Muscle cramps   • Ciprofloxacin Hives       Past Surgical History:   Procedure  Laterality Date   • CARDIAC CATHETERIZATION  01/2016    Dr. Broadbent; widely patent previously placed stents in the left anterior descending and obstructive disease involving the diagonal branch which was treated medically   • CARDIAC CATHETERIZATION N/A 7/14/2017    Procedure: Left Heart Cath;  Surgeon: Wade Ramey MD;  Location:  [facility] CATH INVASIVE LOCATION;  Service:    • CARDIAC CATHETERIZATION Left 10/15/2018    Procedure: Cardiac Catheterization/Vascular Study;  Surgeon: Wade Ramey MD;  Location:  [facility] CATH INVASIVE LOCATION;  Service: Cardiology   • CARDIAC CATHETERIZATION  10/15/2018    Procedure: Functional Flow [place];  Surgeon: Wade Ramey MD;  Location:  [facility] CATH INVASIVE LOC[facility];  Service: Cardiology   • CARDIAC CATHETERIZATION N/A 10/15/2018    Procedure: Left ventriculography;  Surgeon: Wade Ramey MD;  Location:  [facility] CATH INVASIVE LOCATION;  Service: Cardiology   • CARDIAC CATHETERIZATION Left 6/26/2019    Procedure: Cardiac Catheterization/Vascular Study VEL OK  HE WILL WAIT 1 YEAR FOR SHOULDER SURGERY ;  Surgeon: Wade Ramey MD;  Location: [facility] CATH INVASIVE LOCATION;  Service: Cardiology   • CHOLECYSTECTOMY     • CHOLECYSTECTOMY WITH INTRAOPERATIVE CHOLANGIOGRAM N/A 8/1/2018    Procedure: CHOLECYSTECTOMY LAPAROSCOPIC INTRAOPERATIVE CHOLANGIOGRAM;  Surgeon: Shane Ann MD;  Location: [facility] OR;  Service: General   • COLONOSCOPY N/A 7/14/2020    Procedure: COLONOSCOPY WITH ANESTHESIA;  Surgeon: Anupam Morales DO;  Location: [facility] ENDOSCOPY;  Service: Gastroenterology;  Laterality: N/A;  pre: abdominal pain  post: diverticulosis  Vinayak Correia PA   • CORONARY ANGIOPLASTY     • CORONARY ARTERY BYPASS GRAFT N/A 7/6/2019    Procedure: CABG X2 WITH LIMA, LEFT LEG OVH, AND PLACEMENT OF LEFT FEMORAL ARTERIAL LINE;  Surgeon: Steven Tang MD;  Location: [facility] OR;  Service: Cardiothoracic   • CORONARY STENT PLACEMENT      x 6   • ENDOSCOPIC FUNCTIONAL SINUS  SURGERY (FESS) Bilateral 2017    Procedure: PROCEDURE PERFORMED:  Bilateral functional endoscopic anterior ethmoidectomy with bilateral middle meatal antrostomy Septoplasty Right kathia bullosa resection Bilateral inferior turbinate reduction via Coblation;  Surgeon: Mayank Ibarra MD;  Location: [facility] OR;  Service:    • ENDOSCOPY N/A 2018    Procedure: ESOPHAGOGASTRODUODENOSCOPY WITH ANESTHESIA;  Surgeon: Benitez Mas MD;  Location: [facility] ENDOSCOPY;  Service: Gastroenterology   • ENDOSCOPY N/A 2020    Procedure: ESOPHAGOGASTRODUODENOSCOPY WITH ANESTHESIA;  Surgeon: Anupam Morales DO;  Location: [facility] ENDOSCOPY;  Service: Gastroenterology;  Laterality: N/A;  pre: abdominal pain  post: esophagitis  Vinayak Correia PA   • HERNIA REPAIR      x2 inguinal area   • KIDNEY STONE SURGERY     • KNEE SURGERY Right    • OTHER SURGICAL HISTORY      urolift   • PROSTATE SURGERY      Dr. Badillo -    • ROTATOR CUFF REPAIR     • THUMB AMPUTATION Left     partial   • TOE AMPUTATION Right     big       Family History   Problem Relation Age of Onset   • Heart disease Father    • COPD Mother    • Hypertension Mother    • Asthma Mother    • No Known Problems Sister    • Colon cancer Paternal Uncle    • Prostate cancer Maternal Grandfather    • No Known Problems Sister    • Colon cancer Maternal Grandmother    • Colon polyps Maternal Grandmother        Social History     Socioeconomic History   • Marital status:      Spouse name: Not on file   • Number of children: Not on file   • Years of education: Not on file   • Highest education level: Not on file   Tobacco Use   • Smoking status: Former Smoker     Packs/day: 1.50     Years: 4.50     Pack years: 6.75     Types: Cigarettes, Cigars     Last attempt to quit:      Years since quittin.6   • Smokeless tobacco: Former User     Types: Chew     Quit date:    Substance and Sexual Activity   • Alcohol use: No   • Drug use: No   •  Sexual activity: Defer           Objective   Physical Exam   Constitutional: He appears well-developed and well-nourished. No distress. He is not intubated.   HENT:   Head: Normocephalic and atraumatic.   Eyes: Conjunctivae and EOM are normal. Right eye exhibits no discharge. Left eye exhibits no discharge.   Neck: Normal range of motion. Neck supple. No JVD present. No tracheal deviation present.   Cardiovascular: Normal rate, regular rhythm, normal heart sounds and intact distal pulses. Exam reveals no gallop and no friction rub.   No murmur heard.  Pulmonary/Chest: Effort normal and breath sounds normal. No accessory muscle usage or stridor. No apnea, no tachypnea and no bradypnea. He is not intubated. No respiratory distress. He has no decreased breath sounds. He has no wheezes. He has no rhonchi. He has no rales.   Abdominal: Soft. Bowel sounds are normal. He exhibits no distension, no ascites and no mass. There is no splenomegaly or hepatomegaly. There is no tenderness. There is no rebound and no guarding.   Genitourinary: Rectal exam shows guaiac negative stool.   Musculoskeletal: Normal range of motion. He exhibits no edema or deformity.   Neurological: He is alert.   Skin: Skin is warm and dry. Capillary refill takes less than 2 seconds. No rash noted. He is not diaphoretic. No pallor.   Psychiatric: He has a normal mood and affect. His behavior is normal.   Nursing note and vitals reviewed.      Procedures           ED Course                                           MDM  Number of Diagnoses or Management Options  Diarrhea, unspecified type: new and requires workup  Shortness of breath: new and requires workup  Diagnosis management comments: Patient presents with shortness of breath.  Upon arrival he is in no acute distress vital signs are reassuring.  IV access is obtained and labs are sent.  He was evaluated with a CT angiogram of the chest.  Low concern for acute intra-abdominal pathology with no  abdominal pain, nausea, vomiting.  He has no abdominal tenderness on exam.  He does have diarrhea but denies any blood.Troponin is undetectable and repeat troponin is also undetectable, lactic acid initially elevated but down trends to 1.3, ABG is unremarkable, CBC within normal limits, CMP with hyperglycemia but no signs of kidney injury no elevation of the anion gap, lipase is 35 CT angiogram of the chest shows no evidence of PE or right heart strain, small pulmonary nodules and stable ectasia of the aorta.  Low concern for PE with negative CT.  Low concern for pneumonia with negative CT.  Low concern for cardiac tamponade with no hypotension, no tachycardia, no pericardial effusion on CT, and no risk factors for pericardial effusion.  Low concern for aortic dissection with no pain and then negative CT.  Low concern for acute coronary syndrome as he had a negative stress test 6 months ago, has no chest pain, has no EKG changes and has reassuring troponin values.  Upon reevaluation the patient feels better.  I believe his elevated lactic acid was probably due to some mild dehydration from his diarrhea.  He is comfortable going home.  He is discharged in good condition with normal vitals and is given commonsense return precautions which he verbalizes understanding of.       Amount and/or Complexity of Data Reviewed  Clinical lab tests: ordered and reviewed  Tests in the radiology section of CPT®: ordered and reviewed  Decide to obtain previous medical records or to obtain history from someone other than the patient: yes  Independent visualization of images, tracings, or specimens: yes (EKG today shows sinus rhythm at a rate of 81, prolonged FL interval, QT within normal limits, narrow QRS, there are ST changes in the inferior leads, he has some FL depression in lead aVF but when compared with the TP segment does not meet STEMI criteria in lead F or lead II, does have elevation in lead III but not contiguous and no  STEMI criteria.  T wave inversions in lead I and aVL, otherwise poor R wave progression.  When compared with EKG on 7/27 this appears new however looks identical to EKGs obtained on 7/26.  Compared with 2 EKGs on 7/26 and appears identical.)    Risk of Complications, Morbidity, and/or Mortality  Presenting problems: high  Diagnostic procedures: low  Management options: high    Patient Progress  Patient progress: improved      Final diagnoses:   Shortness of breath   Diarrhea, unspecified type            Greg Miller MD  08/07/20 6554

## 2020-08-08 ENCOUNTER — READMISSION MANAGEMENT (OUTPATIENT)
Dept: CALL CENTER | Facility: HOSPITAL | Age: 60
End: 2020-08-08

## 2020-08-08 NOTE — OUTREACH NOTE
Medical Week 2 Survey      Responses   [facility] patient discharged from?  [place]   COVID-19 Test Status  Confirmed   Does the patient have one of the following disease processes/diagnoses(primary or secondary)?  Other   Week 2 attempt successful?  Yes   Call start time  1244   Discharge diagnosis  Chest pain-Covid 19   Call end time  1256   List who call center can speak with  wifeDaly   Person spoke with today (if not patient) and relationship  wife-Daniela   Meds reviewed with patient/caregiver?  Yes   Is the patient having any side effects they believe may be caused by any medication additions or changes?  No   Does the patient have all medications ordered at discharge?  Yes   Is the patient taking all medications as directed (includes completed medication regime)?  Yes   Does the patient have a primary care provider?   Yes   Does the patient have an appointment with their PCP within 7 days of discharge?  No   Comments regarding PCP  Pt reports he has called his Primary a couple of times but they have not received a return call. She reports [facility]    What is preventing the patient from scheduling follow up appointments within 7 days of discharge?  Waiting on return call   Has the patient kept scheduled appointments due by today?  N/A   Has home health visited the patient within 72 hours of discharge?  N/A   Pulse Ox monitoring  None   Psychosocial issues?  No   Comments  Reports pt is feeling better this morning but is still having some diarrhea.    Did the patient receive a copy of their discharge instructions?  Yes   Nursing interventions  Reviewed instructions with patient   What is the patient's perception of their health status since discharge?  Improving   Is the patient/caregiver able to teach back signs and symptoms related to disease process for when to call PCP?  Yes   Is the patient/caregiver able to teach back signs and symptoms related to disease  process for when to call 911?  Yes   Is the patient/caregiver able to teach back the hierarchy of who to call/visit for symptoms/problems? PCP, Specialist, Home health nurse, Urgent Care, ED, 911  Yes   Additional teach back comments  Discussed hierarchy of care and staying hydrated.    Week 2 Call Completed?  Yes          Gerardo Gray RN

## 2020-08-17 ENCOUNTER — READMISSION MANAGEMENT (OUTPATIENT)
Dept: CALL CENTER | Facility: HOSPITAL | Age: 60
End: 2020-08-17

## 2020-08-17 NOTE — OUTREACH NOTE
Medical Week 3 Survey      Responses   [facility] patient discharged from?  [place]   COVID-19 Test Status  Confirmed   Does the patient have one of the following disease processes/diagnoses(primary or secondary)?  Other   Week 3 attempt successful?  Yes   Call start time  0918   Call end time  0922   Is patient permission given to speak with other caregiver?  Yes   Person spoke with today (if not patient) and relationship  wife-Daniela Abbasis reviewed with patient/caregiver?  Yes   Is the patient having any side effects they believe may be caused by any medication additions or changes?  No   Does the patient have all medications ordered at discharge?  Yes   Is the patient taking all medications as directed (includes completed medication regime)?  Yes   Does the patient have a primary care provider?   Yes   Has the patient kept scheduled appointments due by today?  Yes   Comments  [facility]   Has home health visited the patient within 72 hours of discharge?  N/A   Pulse Ox monitoring  None   Psychosocial issues?  No   Did the patient receive a copy of their discharge instructions?  Yes   What is the patient's perception of their health status since discharge?  Improving   Week 3 Call Completed?  Yes   Wrap up additional comments  Wife states is doing well-has been symptom free for past week. Denies any needs today.          Yesica Claros RN

## 2020-08-24 ENCOUNTER — READMISSION MANAGEMENT (OUTPATIENT)
Dept: CALL CENTER | Facility: HOSPITAL | Age: 60
End: 2020-08-24

## 2020-08-24 NOTE — OUTREACH NOTE
Medical Week 4 Survey      Responses   [facility] [place] patient discharged from?  [place]   COVID-19 Test Status  Confirmed   Does the patient have one of the following disease processes/diagnoses(primary or secondary)?  Other   Week 4 attempt successful?  Yes   Call start time  0902   Call end time  0904   Meds reviewed with patient/caregiver?  Yes   Is the patient having any side effects they believe may be caused by any medication additions or changes?  No   Is the patient taking all medications as directed (includes completed medication regime)?  Yes   Has the patient kept scheduled appointments due by today?  Yes   Is the patient still receiving Home Health Services?  N/A   Pulse Ox monitoring  None   Psychosocial issues?  No   What is the patient's perception of their health status since discharge?  Improving   Is the patient/caregiver able to teach back signs and symptoms related to disease process for when to call PCP?  Yes   Is the patient/caregiver able to teach back signs and symptoms related to disease process for when to call 911?  Yes   Is the patient/caregiver able to teach back the hierarchy of who to call/visit for symptoms/problems? PCP, Specialist, Home health nurse, Urgent Care, ED, 911  Yes   Week 4 Call Completed?  Yes   Would the patient like one additional call?  No   Graduated  Yes   Wrap up additional comments  States is doing great. Denies any s/s or problems.          Yesica Claros RN

## 2020-08-31 ENCOUNTER — OFFICE VISIT (OUTPATIENT)
Dept: CARDIOLOGY | Facility: CLINIC | Age: 60
End: 2020-08-31

## 2020-08-31 VITALS
WEIGHT: 250 LBS | OXYGEN SATURATION: 98 % | BODY MASS INDEX: 33.86 KG/M2 | HEART RATE: 80 BPM | HEIGHT: 72 IN | DIASTOLIC BLOOD PRESSURE: 80 MMHG | SYSTOLIC BLOOD PRESSURE: 142 MMHG

## 2020-08-31 DIAGNOSIS — I69.30 CHRONIC LEFT ARTERIAL ISCHEMIC STROKE, ICA (INTERNAL CAROTID ARTERY): ICD-10-CM

## 2020-08-31 DIAGNOSIS — I71.20 THORACIC AORTIC ANEURYSM WITHOUT RUPTURE (HCC): Primary | ICD-10-CM

## 2020-08-31 DIAGNOSIS — I10 ESSENTIAL HYPERTENSION: ICD-10-CM

## 2020-08-31 DIAGNOSIS — R91.8 LUNG NODULES: ICD-10-CM

## 2020-08-31 DIAGNOSIS — I48.92 ATRIAL FLUTTER, UNSPECIFIED TYPE (HCC): ICD-10-CM

## 2020-08-31 DIAGNOSIS — E11.9 TYPE 2 DIABETES MELLITUS WITHOUT COMPLICATION, WITH LONG-TERM CURRENT USE OF INSULIN (HCC): Chronic | ICD-10-CM

## 2020-08-31 DIAGNOSIS — E11.8 DIABETIC COMPLICATION (HCC): ICD-10-CM

## 2020-08-31 DIAGNOSIS — Z79.4 TYPE 2 DIABETES MELLITUS WITHOUT COMPLICATION, WITH LONG-TERM CURRENT USE OF INSULIN (HCC): Chronic | ICD-10-CM

## 2020-08-31 DIAGNOSIS — I25.119 CORONARY ARTERY DISEASE INVOLVING NATIVE CORONARY ARTERY OF NATIVE HEART WITH ANGINA PECTORIS (HCC): ICD-10-CM

## 2020-08-31 DIAGNOSIS — G47.33 OSA ON CPAP: ICD-10-CM

## 2020-08-31 DIAGNOSIS — Z79.899 LONG-TERM USE OF HIGH-RISK MEDICATION: ICD-10-CM

## 2020-08-31 DIAGNOSIS — Z99.89 OSA ON CPAP: ICD-10-CM

## 2020-08-31 DIAGNOSIS — R94.39 ABNORMAL STRESS TEST: ICD-10-CM

## 2020-08-31 DIAGNOSIS — R07.2 PRECORDIAL CHEST PAIN: ICD-10-CM

## 2020-08-31 PROCEDURE — 99214 OFFICE O/P EST MOD 30 MIN: CPT | Performed by: INTERNAL MEDICINE

## 2020-08-31 PROCEDURE — 93000 ELECTROCARDIOGRAM COMPLETE: CPT | Performed by: INTERNAL MEDICINE

## 2020-08-31 NOTE — PROGRESS NOTES
Carlos A Boyer Jr.  [id number]  1960  60 y.o.  male      You have chosen to receive care through a telehealth visit.  Do you consent to use a video/audio connection for your medical care today? Yes     Referring Provider: Vinayak Correia PA    Reason for Follow-up Visit:       Routine virtual visit using above modality   Prior ER visit for Paroxysmal atrial fibrillation with with rapid ventricular response    CARLOS guided cardioversion, on anticoagulation with Eliquis  coronary artery disease stented coronary artery   Type 2 diabetes mellitus   S/p CABG x 2 Dr Tang 7/2019  S/P  right rotator cuff surgery Dr Divina Michelle [facility]   myocardial perfusion scan as below   Now with chest pain as below     Subjective     Chest pain with exertion and also at rest,   moderate substernal, pressure like. Lasts less than 5-10 minutes  Started 6  weeks ago  Sometimes takes S/L NTG with relief   Occurs once or twice a week or at other times daily  No associated diaphoresis    No associated nausea  No radiation  Precipitated with exertion    Relieved with rest  Not positional    No change with intake of food or antacids  No change with breathing  Moderate associated dyspnea      BP well controlled at home.     Blood sugars well controlled at home    Exercising more     History of present illness:  Carlos A Boyer Jr. is a 60 y.o. yo male with history of chest pain who presents today for   Chief Complaint   Patient presents with   • Atrial Fibrillation     3 wk d/c- occasionally have heart racing in the 150's patient does monitor weight and BP at home, BP is up and down.    • Chest Pain     still having real sharp pain on left side in the heart.    • Shortness of Breath     nothing out of the normal. about same LOV   • Palpitations   .    History  Past Medical History:   Diagnosis Date   • Arthritis    • Asthma    • Cancer (CMS/HCC)    • Carotid disease, bilateral (CMS/HCC)    • Chest pain    • Chronic ischemic heart disease     • Chronic sinusitis    • Maribell bullosa    • Coronary artery disease    • Deviated septum    • Diabetes mellitus (CMS/HCC)    • Difficulty urinating    • Diverticulitis    • Enlarged prostate    • Fatty liver    • GERD (gastroesophageal reflux disease)    • Heart disease    • Hyperlipidemia    • Hypertension    • Hypertrophy of nasal turbinates    • Keratoderma    • Kidney stone    • Migraine    • Murmur, heart    • Myocardial infarction (CMS/HCC)    • Obesity    • PONV (postoperative nausea and vomiting)    • Psoriasis    • Seizures (CMS/HCC)    • Sinus congestion    • Skin cancer    • Sleep apnea    • SOB (shortness of breath)    • Stroke (CMS/HCC)    • UTI (urinary tract infection)    ,   Past Surgical History:   Procedure Laterality Date   • CARDIAC CATHETERIZATION  01/2016    Dr. Broadbent; widely patent previously placed stents in the left anterior descending and obstructive disease involving the diagonal branch which was treated medically   • CARDIAC CATHETERIZATION N/A 7/14/2017    Procedure: Left Heart Cath;  Surgeon: Wade Ramey MD;  Location:  [facility];  Service:    • CARDIAC CATHETERIZATION Left 10/15/2018    Procedure: Cardiac Catheterization/Vascular Study;  Surgeon: Wade Ramey MD;  Location:  [facility];  Service: Cardiology   • CARDIAC CATHETERIZATION  10/15/2018    Procedure: Functional Flow [place];  Surgeon: Wade Ramey MD;  Location:  [facility];  Service: Cardiology   • CARDIAC CATHETERIZATION N/A 10/15/2018    Procedure: Left ventriculography;  Surgeon: Wade Ramey MD;  Location:  [facility];  Service: Cardiology   • CARDIAC CATHETERIZATION Left 6/26/2019    Procedure: Cardiac Catheterization/Vascular Study VEL OK  HE WILL WAIT 1 YEAR FOR SHOULDER SURGERY ;  Surgeon: Wade Ramey MD;  Location:  [facility];  Service: Cardiology   • CHOLECYSTECTOMY     • CHOLECYSTECTOMY WITH INTRAOPERATIVE  CHOLANGIOGRAM N/A 8/1/2018    Procedure: CHOLECYSTECTOMY LAPAROSCOPIC INTRAOPERATIVE CHOLANGIOGRAM;  Surgeon: Shane Ann MD;  Location: [facility] OR;  Service: General   • COLONOSCOPY N/A 7/14/2020    Procedure: COLONOSCOPY WITH ANESTHESIA;  Surgeon: Anupam Morales DO;  Location: [facility] ENDOSCOPY;  Service: Gastroenterology;  Laterality: N/A;  pre: abdominal pain  post: diverticulosis  Vinayak Correia PA   • CORONARY ANGIOPLASTY     • CORONARY ARTERY BYPASS GRAFT N/A 7/6/2019    Procedure: CABG X2 WITH LIMA, LEFT LEG OVH, AND PLACEMENT OF LEFT FEMORAL ARTERIAL LINE;  Surgeon: Steven Tang MD;  Location: [facility] OR;  Service: Cardiothoracic   • CORONARY STENT PLACEMENT      x 6   • ENDOSCOPIC FUNCTIONAL SINUS SURGERY (FESS) Bilateral 12/13/2017    Procedure: PROCEDURE PERFORMED:  Bilateral functional endoscopic anterior ethmoidectomy with bilateral middle meatal antrostomy Septoplasty Right kathia bullosa resection Bilateral inferior turbinate reduction via Coblation;  Surgeon: Mayank Ibarra MD;  Location: [facility] OR;  Service:    • ENDOSCOPY N/A 7/30/2018    Procedure: ESOPHAGOGASTRODUODENOSCOPY WITH ANESTHESIA;  Surgeon: Benitez Mas MD;  Location: [facility] ENDOSCOPY;  Service: Gastroenterology   • ENDOSCOPY N/A 7/14/2020    Procedure: ESOPHAGOGASTRODUODENOSCOPY WITH ANESTHESIA;  Surgeon: Anupam Morales DO;  Location: [facility] ENDOSCOPY;  Service: Gastroenterology;  Laterality: N/A;  pre: abdominal pain  post: esophagitis  Vinayak Correia PA   • HERNIA REPAIR      x2 inguinal area   • KIDNEY STONE SURGERY     • KNEE SURGERY Right    • OTHER SURGICAL HISTORY      urolift   • PROSTATE SURGERY      Dr. Badillo - 2017   • ROTATOR CUFF REPAIR     • THUMB AMPUTATION Left     partial   • TOE AMPUTATION Right     big   ,   Family History   Problem Relation Age of Onset   • Heart disease Father    • COPD Mother    • Hypertension Mother    • Asthma Mother    • No Known Problems Sister    •  Colon cancer Paternal Uncle    • Prostate cancer Maternal Grandfather    • No Known Problems Sister    • Colon cancer Maternal Grandmother    • Colon polyps Maternal Grandmother    ,   Social History     Tobacco Use   • Smoking status: Former Smoker     Packs/day: 1.50     Years: 4.50     Pack years: 6.75     Types: Cigarettes, Cigars     Last attempt to quit:      Years since quittin.6   • Smokeless tobacco: Former User     Types: Chew     Quit date:    Substance Use Topics   • Alcohol use: No   • Drug use: No   ,     Medications  Current Outpatient Medications   Medication Sig Dispense Refill   • albuterol (PROVENTIL HFA;VENTOLIN HFA) 108 (90 BASE) MCG/ACT inhaler Inhale 2 puffs Every 6 (Six) Hours As Needed for wheezing.     • apixaban (ELIQUIS) 5 MG tablet tablet Take 1 tablet by mouth Every 12 (Twelve) Hours. 180 tablet 3   • aspirin 81 MG chewable tablet Chew 81 mg Daily.     • calcium polycarbophil (FIBERCON) 625 MG tablet Take 625 mg by mouth Daily.     • carboxymethylcellulose (REFRESH PLUS) 0.5 % solution Administer 1 drop to both eyes 4 (Four) Times a Day As Needed for Dry Eyes.     • Empagliflozin (JARDIANCE) 10 MG tablet Take 1 tablet by mouth Daily.     • fluticasone (FLONASE) 50 MCG/ACT nasal spray 2 sprays into each nostril Daily.     • furosemide (LASIX) 40 MG tablet TAKE 1 TABLET BY MOUTH ONCE DAILY AS NEEDED FOR WEIGHT GAIN GREATER THAN 2 LBS IN A DAY OR INCREASING CHEST CONGESTION  0   • insulin aspart (novoLOG FLEXPEN) 100 UNIT/ML solution pen-injector sc pen Inject 30 Units under the skin into the appropriate area as directed Every Morning. 34 units  at breakfast, 30 units at lunch, 34 units hs     • Insulin Glargine (LANTUS SOLOSTAR) 100 UNIT/ML injection pen Inject 60-90 Units under the skin into the appropriate area as directed 2 (Two) Times a Day. 65 units QAM AND 70 units QPM     • isosorbide mononitrate (IMDUR) 30 MG 24 hr tablet Take 1 tablet by mouth Daily. 30 tablet 0   •  lamoTRIgine (LaMICtal) 200 MG tablet Take 1 tablet by mouth 2 (Two) Times a Day. (Patient taking differently: Take 100 mg by mouth 2 (Two) Times a Day.) 180 tablet 1   • levETIRAcetam (KEPPRA) 500 MG tablet Take 3 tablets every morning, take 4 tablets every night. (Patient taking differently: Take 1,500 mg by mouth Daily With Breakfast. Take 3 tablets every morning, take 4 tablets every night.) 630 tablet 1   • Liraglutide (VICTOZA SC) Inject 1.2 mg under the skin into the appropriate area as directed Every Night. 1.2     • lisinopril (PRINIVIL,ZESTRIL) 20 MG tablet Take 1 tablet by mouth Daily. 90 tablet 3   • metFORMIN (GLUCOPHAGE) 1000 MG tablet Take 1 tablet by mouth 2 (Two) Times a Day With Meals. HOLD x 48 hours post contrast. May resume 3/18/18     • Multiple Vitamin (MULTI VITAMIN PO) Take 1 tablet by mouth Daily.     • mupirocin (BACTROBAN) 2 % ointment Apply 1 application topically to the appropriate area as directed 3 (Three) Times a Day.     • nitroglycerin (NITROSTAT) 0.4 MG SL tablet Place 0.4 mg under the tongue Every 5 (Five) Minutes As Needed for chest pain. Take no more than 3 doses in 15 minutes.     • pantoprazole (PROTONIX) 40 MG EC tablet Take 40 mg by mouth 2 (Two) Times a Day.     • psyllium (METAMUCIL) 58.6 % packet Take 1 packet by mouth Daily As Needed (constipation).     • urea (CARMOL) 40 % ointment Apply  topically to the appropriate area as directed As Needed for Dry Skin.     • ondansetron ODT (ZOFRAN-ODT) 4 MG disintegrating tablet Take 1 tablet by mouth Every 4 (Four) Hours As Needed for Nausea or Vomiting. 10 tablet 0     No current facility-administered medications for this visit.        Allergies:  Flagyl [metronidazole]; Atorvastatin; and Ciprofloxacin    Review of Systems  Review of Systems   Constitution: Positive for malaise/fatigue.   HENT: Negative.    Eyes: Negative.    Cardiovascular: Positive for chest pain and dyspnea on exertion. Negative for claudication, cyanosis,  "irregular heartbeat, leg swelling, near-syncope, orthopnea, palpitations, paroxysmal nocturnal dyspnea and syncope.   Respiratory: Negative.    Endocrine: Negative.    Hematologic/Lymphatic: Negative.    Skin: Negative.    Musculoskeletal: Positive for arthritis and back pain.   Gastrointestinal: Negative for anorexia.   Genitourinary: Negative.    Neurological: Positive for dizziness, light-headedness and weakness.   Psychiatric/Behavioral: Negative.        Objective     Physical Exam:      Vitals:    20 0932 20 0935   BP:  142/80   Pulse:  80   SpO2:  98%   Weight:  113 kg (250 lb)   Height: 182.9 cm (72.01\") 182.9 cm (72.01\")         Physical Exam   Constitutional: He appears well-developed.   HENT:   Head: Normocephalic.   Neck: Normal carotid pulses and no JVD present. No tracheal tenderness present. Carotid bruit is not present. No tracheal deviation and no edema present.   Cardiovascular: Regular rhythm, normal heart sounds and normal pulses.   Pulmonary/Chest: Effort normal. No stridor.   Abdominal: Soft. He exhibits no distension. There is no hepatosplenomegaly. There is no tenderness.   Neurological: He is alert. He has normal strength. No cranial nerve deficit or sensory deficit.   Skin: Skin is warm.   Psychiatric: He has a normal mood and affect. His speech is normal and behavior is normal.       Results Review:      Carlos A Boyer Jr.   Regadenoson Stress Test With Myocardial Perfusion SPECT (Multi Study)   Order# [id number]   Reading physician: Wade Ramey MD Ordering physician: Wade Ramey MD Study date: 20   Patient Information     Patient Name  Carlos A Boyer Jr. MRN  [id number] Sex  Male  (Age)  1960 (60 y.o.)   Interpretation Summary     · Left ventricular ejection fraction is normal (Calculated EF = 54%).  · Myocardial perfusion imaging indicates a medium-sized infarct located in the anterior wall and apex with no significant ischemia noted.  · Diaphragmatic " attenuation and GI artifacts are present.  · Raw images reviewed with the following abnormalities noted: vertical motion.           Conclusion of cardiac catheterization     Left main coronary arteries normal  Left anterior descending coronary artery has patent stents  The distal edge of the stent in the mid left anterior descending coronary artery has approximately 60% stenosis  Highly abnormal fractional flow reserve of this at 0.78 without adenosine stress.  PTCA done of the segment.  Despite multiple attempts and use of guide liner could not advance the stent due to earlier implanted stents.  Left circumflex coronary artery is codominant and large  The proximal portion of the first obtuse marginal branch has a 60% stenosis with normal fractional flow reserve above 0.85.  Right coronary artery is large and codominant without any high-grade lesions.     Left ventricular end-diastolic pressure is mildly elevated to 15 mmHg.  No gradient across aortic valve on pullback.  Left ventriculography shows a hyperdynamic left ventricle with ejection fraction of 75%.     Percutaneous coronary intervention     XB 3.5 guide advised used for fractional flow reserve of the obtuse marginal branch as well as of the left anterior descending coronary artery  Then we did try to advance a 2.5 mm stent.  We used 2 different size stents and could not cross  We used a guide liner and does not did not have placed cross across the stent.  We then did balloon angioplasty using 2.0 mm balloon.  Stenosis was reduced from 60% down to less than 10%.  BEVERLEY-3 flow before and after procedure.           ____________________________________________________________________________________________________________________________________________     Plan after cardiac catheterization        Dual antiplatelet therapy minimum of 1 year preferably longer  Statin ACE inhibitors beta-blockers  If there is repeat restenosis of the left anterior descending  coronary artery would consider surgical revascularization.  Target LDL less than 70 mg/dL.  Maximize antianginal therapy.  Intensive risk factor modifications for both primary and secondary prevention if applicable  Hydration   Observation     Results for orders placed during the hospital encounter of 09/05/19   Adult Transesophageal Echo (CARLOS) W/ Cont if Necessary Per Protocol    Narrative · No evidence of left atrial appendage thrombus - okay to proceed with   cardioversion.  · Left ventricular systolic function is normal.  · Moderate mitral valve regurgitation is present  · Mild aortic insufficiency.  · Normal size and function of the right ventricle.  · Negative bubble study.      10/15/18        Conclusion     The left main coronary artery is normal  Left anterior descending coronary artery and diagonal branches are patent with a widely patent stent noted in the  left anterior descending coronary artery    Mid left anterior descending coronary artery is a 50% stenosis and hemodynamically not significant.  Left circumflex coronary artery is large with a first obtuse marginal around 40-60% stenosis.  Fractional flow reserve of this is completely normal and at above 0.9.  Left circumflex coronary artery is a codominant.  Right coronary artery is codominant large with distal small vessel disease.     Left ventricular end-diastolic pressure is normal at 11 mmHg no gradient across aortic valve on pullback  Left ventriculography shows ejection fraction of 55%.           Plan     Continue current medication  Symptomatic treatment for small vessel disease with antianginals  If stable can be discharged home later today  Intensive risk factor modifications for both primary and secondary prevention if applicable  Hydration   Observation         7/17 cath  Conclusion  Nonocclusive epicardial coronary arteries with widely patent stents in the  left anterior descending coronary artery   artery and left circumflex coronary  artery.  Hyperdynamic left ventricle with ejection fraction of 75%.  LVEDP   17    mm Hg     Plan  Workup for noncardiac causes of chest pain this can be done as outpatient.  His d-dimer is within normal range  After a period of observation of stable can be discharged either later today.  Keep follow-up appointment with me  Ongoing risk factor modifications keep LDL below 70 mg/dL  Hydration   Observation       ECG 12 Lead  Date/Time: 8/31/2020 10:11 AM  Performed by: Wade Ramey MD  Authorized by: Wade Ramey MD   Comparison: compared with previous ECG from 8/7/2020  Rhythm: sinus rhythm  Rate: normal  Q waves: V1, V2, V3 and V4    ST Depression: I and aVL  QRS axis: normal    Clinical impression: abnormal EKG            Assessment/Plan   Patient Active Problem List   Diagnosis   • Type 2 diabetes mellitus without complication, with long-term current use of insulin (CMS/[facility])   • Gastroesophageal reflux disease without esophagitis   • Essential hypertension   • Seizure disorder (CMS/[facility])   • Carotid disease, bilateral (CMS/[facility])   • Coronary artery disease involving native coronary artery of native heart with angina pectoris (CMS/[facility])   • Mixed hyperlipidemia   • Chronic left arterial ischemic stroke, ICA (internal carotid artery)   • HOA on CPAP   • Benign non-nodular prostatic hyperplasia with lower urinary tract symptoms   • Deviated nasal septum   • Maribell bullosa   • Hypertrophy of both inferior nasal turbinates   • Chronic sinusitis of both maxillary sinuses   • S/P FESS (functional endoscopic sinus surgery)   • Diabetic complication (CMS/[facility])   • Epigastric pain   • Diabetic peripheral neuropathy (CMS/[facility])   • Abnormal stress test   • Stenosis of left carotid artery   • Class 1 obesity in adult   • 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CMS/[facility])   • History of seizure   • Lung nodules   • Cardiac murmur   • Thoracic aortic aneurysm without rupture (CMS/[facility])   • Paroxysmal atrial fibrillation (CMS/[facility])   •  Atrial flutter (CMS/HCC) with cardioversion on Sept 5, 2019   • Acute diastolic congestive heart failure (CMS/HCC)   • Fluid overload   • Pleural effusion   • History of hyperlipidemia   • Respiratory distress   • Long-term use of high-risk medication   • Dysphagia   • Left lower quadrant abdominal pain   • Chest pain   • COVID-19 virus detected   • Shortness of breath       ____________________________________________________________________________________________________________________________________________  Health maintenance and recommendations      Offered to give patient  a copy      Questions were encouraged, asked and answered to the patient's  understanding and satisfaction. Questions if any regarding current medications and side effects, need for refills and importance of compliance to medications stressed.    Reviewed available prior notes, consults, prior visits, laboratory findings, radiology and cardiology relevant reports. Updated chart as applicable. I have reviewed the patient's medical history in detail and updated the computerized patient record as relevant.      Updated patient regarding any new or relevant abnormalities on review of records or any new findings on physical exam. Mentioned to patient about purpose of visit and desirable health short and long term goals and objectives.    Primary to monitor CBC CMP Lipid panel and TSH as applicable    ___________________________________________________________________________________________________________________________________________         Plan       The current medical regimen is effective;  continue present plan and medications.     Keep A1c less than 7 Primary to monitor  Keep LDL below 70 mg/dl. Monitor liver and renal functions.   Monitor CBC, CMP, TSH (as indicated) and Lipid Panel by primary      Orders Placed This Encounter   Procedures   • CT Angiogram Coronary     Standing Status:   Future     Standing Expiration Date:    8/31/2021     Scheduling Instructions:      Has bypass graft       SCAN ACCORDINGLY   • ECG 12 Lead     This order was created via procedure documentation        S/L NTG PRN for chest pain, call me or go to ER if has to use S/L nitroglycerin       Return in about 6 weeks (around 10/12/2020).

## 2020-09-10 ENCOUNTER — HOSPITAL ENCOUNTER (OUTPATIENT)
Dept: CT IMAGING | Facility: HOSPITAL | Age: 60
Discharge: HOME | End: 2020-09-10
Admitting: INTERNAL MEDICINE

## 2020-09-10 VITALS
HEART RATE: 71 BPM | SYSTOLIC BLOOD PRESSURE: 151 MMHG | TEMPERATURE: 97 F | DIASTOLIC BLOOD PRESSURE: 92 MMHG | HEIGHT: 73 IN | WEIGHT: 250 LBS | OXYGEN SATURATION: 98 % | RESPIRATION RATE: 16 BRPM | BODY MASS INDEX: 33.13 KG/M2

## 2020-09-10 DIAGNOSIS — R07.2 PRECORDIAL CHEST PAIN: ICD-10-CM

## 2020-09-10 LAB
CREAT SERPL-MCNC: 0.85 MG/DL (ref 0.76–1.27)
GFR SERPL CREATININE-BSD FRML MDRD: 91 ML/MIN/1.73

## 2020-09-10 PROCEDURE — 75574 CT ANGIO HRT W/3D IMAGE: CPT | Performed by: INTERNAL MEDICINE

## 2020-09-10 PROCEDURE — 75574 CT ANGIO HRT W/3D IMAGE: CPT

## 2020-09-10 PROCEDURE — 82565 ASSAY OF CREATININE: CPT | Performed by: INTERNAL MEDICINE

## 2020-09-10 PROCEDURE — 63710000001 METOPROLOL TARTRATE 50 MG TABLET

## 2020-09-10 PROCEDURE — 63710000001 NITROGLYCERIN 0.4 MG SUBLINGUAL TABLET 25 EACH BOTTLE

## 2020-09-10 PROCEDURE — 0 IOPAMIDOL PER 1 ML: Performed by: INTERNAL MEDICINE

## 2020-09-10 PROCEDURE — A9270 NON-COVERED ITEM OR SERVICE: HCPCS

## 2020-09-10 RX ORDER — LIDOCAINE HYDROCHLORIDE 10 MG/ML
5 INJECTION, SOLUTION EPIDURAL; INFILTRATION; INTRACAUDAL; PERINEURAL AS NEEDED
Status: DISCONTINUED | OUTPATIENT
Start: 2020-09-10 | End: 2020-09-11 | Stop reason: HOSPADM

## 2020-09-10 RX ORDER — NITROGLYCERIN 0.4 MG/1
TABLET SUBLINGUAL
Status: COMPLETED
Start: 2020-09-10 | End: 2020-09-10

## 2020-09-10 RX ORDER — METOPROLOL TARTRATE 5 MG/5ML
5 INJECTION INTRAVENOUS
Status: DISCONTINUED | OUTPATIENT
Start: 2020-09-10 | End: 2020-09-11 | Stop reason: HOSPADM

## 2020-09-10 RX ORDER — METOPROLOL TARTRATE 50 MG/1
TABLET, FILM COATED ORAL
Status: COMPLETED
Start: 2020-09-10 | End: 2020-09-10

## 2020-09-10 RX ORDER — METOPROLOL TARTRATE 50 MG/1
50 TABLET, FILM COATED ORAL ONCE AS NEEDED
Status: COMPLETED | OUTPATIENT
Start: 2020-09-10 | End: 2020-09-10

## 2020-09-10 RX ORDER — METOPROLOL TARTRATE 100 MG/1
100 TABLET ORAL ONCE AS NEEDED
Status: COMPLETED | OUTPATIENT
Start: 2020-09-10 | End: 2020-09-10

## 2020-09-10 RX ORDER — SODIUM CHLORIDE 0.9 % (FLUSH) 0.9 %
3 SYRINGE (ML) INJECTION EVERY 12 HOURS SCHEDULED
Status: DISCONTINUED | OUTPATIENT
Start: 2020-09-10 | End: 2020-09-11 | Stop reason: HOSPADM

## 2020-09-10 RX ORDER — SODIUM CHLORIDE 0.9 % (FLUSH) 0.9 %
10 SYRINGE (ML) INJECTION AS NEEDED
Status: DISCONTINUED | OUTPATIENT
Start: 2020-09-10 | End: 2020-09-11 | Stop reason: HOSPADM

## 2020-09-10 RX ORDER — NITROGLYCERIN 0.4 MG/1
0.4 TABLET SUBLINGUAL
Status: COMPLETED | OUTPATIENT
Start: 2020-09-10 | End: 2020-09-10

## 2020-09-10 RX ADMIN — METOPROLOL TARTRATE 5 MG: 5 INJECTION INTRAVENOUS at 12:33

## 2020-09-10 RX ADMIN — NITROGLYCERIN 0.4 MG: 0.4 TABLET SUBLINGUAL at 12:33

## 2020-09-10 RX ADMIN — METOPROLOL TARTRATE 100 MG: 50 TABLET, FILM COATED ORAL at 11:18

## 2020-09-10 RX ADMIN — IOPAMIDOL 100 ML: 755 INJECTION, SOLUTION INTRAVENOUS at 12:35

## 2020-09-10 RX ADMIN — METOPROLOL TARTRATE 100 MG: 100 TABLET ORAL at 11:18

## 2020-09-14 ENCOUNTER — TELEPHONE (OUTPATIENT)
Dept: CARDIOLOGY | Facility: CLINIC | Age: 60
End: 2020-09-14

## 2020-09-16 ENCOUNTER — TELEPHONE (OUTPATIENT)
Dept: VASCULAR SURGERY | Facility: CLINIC | Age: 60
End: 2020-09-16

## 2020-09-16 NOTE — TELEPHONE ENCOUNTER
Spoke with Mr Boyer reminding him of his appointments for Thursday, September 17th, 2020. [facility] for 9 o'clock testing and follow up afterwards at 11 am with Dr Echavarria. Mr Boyer confirmed he would be here.

## 2020-09-16 NOTE — TELEPHONE ENCOUNTER
Spoke with Patient about his testing and authorizations. His test are authorized through [facility] .

## 2020-09-17 ENCOUNTER — HOSPITAL ENCOUNTER (OUTPATIENT)
Dept: ULTRASOUND IMAGING | Facility: HOSPITAL | Age: 60
Discharge: HOME | End: 2020-09-17

## 2020-09-17 ENCOUNTER — OFFICE VISIT (OUTPATIENT)
Dept: VASCULAR SURGERY | Facility: CLINIC | Age: 60
End: 2020-09-17

## 2020-09-17 VITALS
SYSTOLIC BLOOD PRESSURE: 142 MMHG | HEART RATE: 73 BPM | OXYGEN SATURATION: 98 % | WEIGHT: 250 LBS | BODY MASS INDEX: 33.86 KG/M2 | DIASTOLIC BLOOD PRESSURE: 88 MMHG | HEIGHT: 72 IN

## 2020-09-17 DIAGNOSIS — E78.2 MIXED HYPERLIPIDEMIA: ICD-10-CM

## 2020-09-17 DIAGNOSIS — I10 ESSENTIAL HYPERTENSION: ICD-10-CM

## 2020-09-17 DIAGNOSIS — I65.23 BILATERAL CAROTID ARTERY STENOSIS: ICD-10-CM

## 2020-09-17 DIAGNOSIS — I65.23 BILATERAL CAROTID ARTERY STENOSIS: Primary | ICD-10-CM

## 2020-09-17 DIAGNOSIS — E11.9 TYPE 2 DIABETES MELLITUS WITHOUT COMPLICATION, WITH LONG-TERM CURRENT USE OF INSULIN (HCC): Chronic | ICD-10-CM

## 2020-09-17 DIAGNOSIS — Z79.4 TYPE 2 DIABETES MELLITUS WITHOUT COMPLICATION, WITH LONG-TERM CURRENT USE OF INSULIN (HCC): Chronic | ICD-10-CM

## 2020-09-17 DIAGNOSIS — I73.9 PAD (PERIPHERAL ARTERY DISEASE) (HCC): ICD-10-CM

## 2020-09-17 PROCEDURE — 93880 EXTRACRANIAL BILAT STUDY: CPT

## 2020-09-17 PROCEDURE — 93880 EXTRACRANIAL BILAT STUDY: CPT | Performed by: SURGERY

## 2020-09-17 PROCEDURE — 99214 OFFICE O/P EST MOD 30 MIN: CPT | Performed by: SURGERY

## 2020-09-17 PROCEDURE — 93923 UPR/LXTR ART STDY 3+ LVLS: CPT

## 2020-09-17 PROCEDURE — 93923 UPR/LXTR ART STDY 3+ LVLS: CPT | Performed by: SURGERY

## 2020-09-17 NOTE — TELEPHONE ENCOUNTER
Both his bypass grafts are open  He however has disease in his native coronary arteries  Can see him in the next few weeks or sooner if he has worsening symptoms

## 2020-09-17 NOTE — PROGRESS NOTES
"9/17/2020       Vinayak Correia PA  [address]  [place] [address]    Carlos A Boyer Jr.  1960    Chief Complaint   Patient presents with   • Follow-up     1 yr f/u with carotid testing and ABIs on [id number].  Pt was last seen in the office on 10/01/19.  Pt states that his legs are about the same. Pt denies any stroke like symptoms.   • Med Management     Verbally went over the patient's medications with him.  Pt is currently taking a low dose aspirin and Eliquis.       Dear GINA Felder    HPI  I had the pleasure of seeing your patient Carlos A Boyer Jr. in the office today.  As you recall, Carlos A Boyer Jr. is a 60 y.o.  male who you are currently following for routine health maintenance.  He has a history of coronary artery disease and is status post CABG in July 2019.  He has a history of stroke with facial droop and left sided weakness at that time.  He is maintained Eliquis, and aspirin.  He reports he can not take statins.   He has a 4.4 cm ascending aortic aneurysm that is being followed by Dr. Tang.  He did have noninvasive testing performed, which I did review in office.        Review of Systems   Constitutional: Negative.    HENT: Negative.    Eyes: Negative.    Respiratory: Negative.    Cardiovascular: Negative.    Gastrointestinal: Negative.    Endocrine: Negative.    Genitourinary: Negative.    Musculoskeletal: Negative.    Skin: Negative.    Allergic/Immunologic: Negative.    Neurological: Negative.    Hematological: Negative.    Psychiatric/Behavioral: Negative.    All other systems reviewed and are negative.    /88   Pulse 73   Ht 182.9 cm (72\")   Wt 113 kg (250 lb)   SpO2 98%   BMI 33.91 kg/m²     Physical Exam   Constitutional: He is oriented to person, place, and time. He appears well-developed.   HENT:   Head: Normocephalic and atraumatic.   Eyes: Pupils are equal, round, and reactive to light. No scleral icterus.   Neck: Neck supple. No JVD present. " Carotid bruit is not present. No thyromegaly present.   Cardiovascular: Normal rate, regular rhythm and normal heart sounds.   Pulses:       Carotid pulses are 2+ on the right side and 2+ on the left side.       Femoral pulses are 2+ on the right side and 2+ on the left side.       Popliteal pulses are 2+ on the right side and 2+ on the left side.        Dorsalis pedis pulses are 2+ on the right side and 2+ on the left side.        Posterior tibial pulses are 2+ on the right side and 2+ on the left side.   Pulmonary/Chest: Effort normal and breath sounds normal.   Abdominal: Soft. Bowel sounds are normal. He exhibits no distension, no abdominal bruit and no mass. There is no abdominal tenderness.   Musculoskeletal: Normal range of motion.   Lymphadenopathy:     He has no cervical adenopathy.   Neurological: He is alert and oriented to person, place, and time. No cranial nerve deficit or sensory deficit.   Skin: Skin is warm and dry.   Nursing note and vitals reviewed.    Diagnostic data:  Noninvasive testing including a carotid duplex which shows less than 50% carotid stenosis bilaterally with bilateral antegrade vertebral flow.     The MAHI on the right is noncompressible.  The MAHI in the left is 1.21.  The waveforms are triphasic and well preserved.    Patient Active Problem List   Diagnosis   • Type 2 diabetes mellitus without complication, with long-term current use of insulin (CMS/[facility])   • Gastroesophageal reflux disease without esophagitis   • Essential hypertension   • Seizure disorder (CMS/[facility])   • Carotid disease, bilateral (CMS/[facility])   • Coronary artery disease involving native coronary artery of native heart with angina pectoris (CMS/[facility])   • Mixed hyperlipidemia   • Chronic left arterial ischemic stroke, ICA (internal carotid artery)   • HOA on CPAP   • Benign non-nodular prostatic hyperplasia with lower urinary tract symptoms   • Deviated nasal septum   • Maribell bullosa   • Hypertrophy of both inferior  nasal turbinates   • Chronic sinusitis of both maxillary sinuses   • S/P FESS (functional endoscopic sinus surgery)   • Diabetic complication (CMS/HCC)   • Epigastric pain   • Diabetic peripheral neuropathy (CMS/HCC)   • Abnormal stress test   • Stenosis of left carotid artery   • Class 1 obesity in adult   • 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CMS/HCC)   • History of seizure   • Lung nodules   • Cardiac murmur   • Thoracic aortic aneurysm without rupture (CMS/HCC)   • Paroxysmal atrial fibrillation (CMS/HCC)   • Atrial flutter (CMS/HCC) with cardioversion on Sept 5, 2019   • Acute diastolic congestive heart failure (CMS/HCC)   • Fluid overload   • Pleural effusion   • History of hyperlipidemia   • Respiratory distress   • Long-term use of high-risk medication   • Dysphagia   • Left lower quadrant abdominal pain   • Chest pain   • COVID-19 virus detected   • Shortness of breath        Diagnosis Plan   1. Bilateral carotid artery stenosis     2. Essential hypertension     3. Mixed hyperlipidemia     4. Type 2 diabetes mellitus without complication, with long-term current use of insulin (CMS/HCC)         Plan: After thoroughly evaluating Carlos A Boyer Jr., I believe the best course of action is to remain conservative from a vascular surgery standpoint.  Currently he remains asymptomatic from a strokelike standpoint.  His carotid duplex shows moderate disease on the left side.  We will continue with medical management going forward.  His ABIs are triphasic and he denies any significant claudication symptoms in the lower extremities.  I will see him back in 1 years time with a repeat carotid duplex for continued surveillance. I did discuss vascular risk factors as they pertain to the progression of vascular disease including controlling hypertension, diabetes mellitus, and hyperlipidemia.  These risk factors are currently stable. The patient is to continue taking their medications as previously discussed.   This  was all discussed in full with complete understanding.  Thank you for allowing me to participate in the care of your patient.  Please do not hesitate to call with any questions or concerns.  We will keep you aware of any further encounters with Carlos A Boyer Jr..        Sincerely yours,         DO Bren Rodríguez Jamie Walker, PA

## 2020-09-17 NOTE — TELEPHONE ENCOUNTER
Notified pt - a copy of the report was also given to him. Advised he keep f/u on 10/12/20 & to let us know if he developed any new or worsening symptoms

## 2020-10-12 ENCOUNTER — OFFICE VISIT (OUTPATIENT)
Dept: CARDIOLOGY | Facility: CLINIC | Age: 60
End: 2020-10-12

## 2020-10-12 VITALS
HEART RATE: 86 BPM | OXYGEN SATURATION: 98 % | BODY MASS INDEX: 33.32 KG/M2 | SYSTOLIC BLOOD PRESSURE: 118 MMHG | DIASTOLIC BLOOD PRESSURE: 80 MMHG | HEIGHT: 72 IN | WEIGHT: 246 LBS

## 2020-10-12 DIAGNOSIS — R01.1 CARDIAC MURMUR: Primary | ICD-10-CM

## 2020-10-12 DIAGNOSIS — E11.9 TYPE 2 DIABETES MELLITUS WITHOUT COMPLICATION, WITH LONG-TERM CURRENT USE OF INSULIN (HCC): Chronic | ICD-10-CM

## 2020-10-12 DIAGNOSIS — D69.1 ASPIRIN-LIKE PLATELET FUNCTION DEFECT (HCC): ICD-10-CM

## 2020-10-12 DIAGNOSIS — I69.30 CHRONIC LEFT ARTERIAL ISCHEMIC STROKE, ICA (INTERNAL CAROTID ARTERY): ICD-10-CM

## 2020-10-12 DIAGNOSIS — K21.9 GASTROESOPHAGEAL REFLUX DISEASE WITHOUT ESOPHAGITIS: Chronic | ICD-10-CM

## 2020-10-12 DIAGNOSIS — I65.22 STENOSIS OF LEFT CAROTID ARTERY: ICD-10-CM

## 2020-10-12 DIAGNOSIS — Z79.4 TYPE 2 DIABETES MELLITUS WITHOUT COMPLICATION, WITH LONG-TERM CURRENT USE OF INSULIN (HCC): Chronic | ICD-10-CM

## 2020-10-12 DIAGNOSIS — I48.0 PAROXYSMAL ATRIAL FIBRILLATION (HCC): ICD-10-CM

## 2020-10-12 DIAGNOSIS — I71.20 THORACIC AORTIC ANEURYSM WITHOUT RUPTURE (HCC): ICD-10-CM

## 2020-10-12 DIAGNOSIS — E66.09 CLASS 1 OBESITY DUE TO EXCESS CALORIES WITH SERIOUS COMORBIDITY AND BODY MASS INDEX (BMI) OF 33.0 TO 33.9 IN ADULT: ICD-10-CM

## 2020-10-12 PROCEDURE — 99214 OFFICE O/P EST MOD 30 MIN: CPT | Performed by: INTERNAL MEDICINE

## 2020-10-12 PROCEDURE — 93000 ELECTROCARDIOGRAM COMPLETE: CPT | Performed by: INTERNAL MEDICINE

## 2020-10-12 NOTE — PROGRESS NOTES
Carlos A Boyer Jr.  [id number]  1960  60 y.o.  male           Referring Provider: Vinayak Correia PA    Reason for Follow-up Visit:     Prior ER visit for Paroxysmal atrial fibrillation with with rapid ventricular response    CARLOS guided cardioversion, on anticoagulation with Eliquis  coronary artery disease stented coronary artery   Type 2 diabetes mellitus   S/p CABG x 2 Dr Tang 7/2019  S/P  right rotator cuff surgery Dr Divina Michelle [facility]   myocardial perfusion scan as below   Had chest pain now better   Coronary CT angiography report as below      Subjective       Overall feels the same   No new events or complaints since last visit   Overall the patient feels no major change from baseline symptoms   Similar symptoms as during last visit     Tolerating current medications well with no untoward side effects   Compliant with prescribed medication regimen. Tries to adhere to cardiac diet.      Chest pain at random and not necessarily related to exertion  Overall feels better and chest pain now improved     No change with intake of food or antacids  No change with breathing  Moderate associated dyspnea      BP well controlled at home.     Blood sugars well controlled at home    Exercising more  Recovered from Covid      History of present illness:  Carlos A Boyer Jr. is a 60 y.o. yo male with history of chest pain who presents today for   Chief Complaint   Patient presents with   • Atrial Fibrillation     2 MON FU/ RESUTLS    • Chest Pain   .    History  Past Medical History:   Diagnosis Date   • Arthritis    • Asthma    • Cancer (CMS/HCC)    • Carotid disease, bilateral (CMS/HCC)    • Chest pain    • Chronic ischemic heart disease    • Chronic sinusitis    • Maribell bullosa    • Coronary artery disease    • Deviated septum    • Diabetes mellitus (CMS/HCC)    • Difficulty urinating    • Diverticulitis    • Enlarged prostate    • Fatty liver    • GERD (gastroesophageal reflux disease)    • Heart disease     • Hyperlipidemia    • Hypertension    • Hypertrophy of nasal turbinates    • Keratoderma    • Kidney stone    • Migraine    • Murmur, heart    • Myocardial infarction (CMS/HCC)    • Obesity    • PONV (postoperative nausea and vomiting)    • Psoriasis    • Seizures (CMS/HCC)    • Sinus congestion    • Skin cancer    • Sleep apnea    • SOB (shortness of breath)    • Stroke (CMS/HCC)    • UTI (urinary tract infection)    ,   Past Surgical History:   Procedure Laterality Date   • CARDIAC CATHETERIZATION  01/2016    Dr. Broadbent; widely patent previously placed stents in the left anterior descending and obstructive disease involving the diagonal branch which was treated medically   • CARDIAC CATHETERIZATION N/A 7/14/2017    Procedure: Left Heart Cath;  Surgeon: Wade Ramey MD;  Location:  [facility] CATH INVASIVE LOCATION;  Service:    • CARDIAC CATHETERIZATION Left 10/15/2018    Procedure: Cardiac Catheterization/Vascular Study;  Surgeon: Wade Ramey MD;  Location:  [facility]ATH [facility];  Service: Cardiology   • CARDIAC CATHETERIZATION  10/15/2018    Procedure: Functional Flow [place];  Surgeon: Wade Ramey MD;  Location:  [facility] CATH [facility];  Service: Cardiology   • CARDIAC CATHETERIZATION N/A 10/15/2018    Procedure: Left ventriculography;  Surgeon: Wade Ramey MD;  Location:  [facility]ATH [facility];  Service: Cardiology   • CARDIAC CATHETERIZATION Left 6/26/2019    Procedure: Cardiac Catheterization/Vascular Study VEL OK  HE WILL WAIT 1 YEAR FOR SHOULDER SURGERY ;  Surgeon: Wade Ramey MD;  Location: [facility] CATH INVASIVE LOCATION;  Service: Cardiology   • CHOLECYSTECTOMY     • CHOLECYSTECTOMY WITH INTRAOPERATIVE CHOLANGIOGRAM N/A 8/1/2018    Procedure: CHOLECYSTECTOMY LAPAROSCOPIC INTRAOPERATIVE CHOLANGIOGRAM;  Surgeon: Shane Ann MD;  Location: [facility] OR;  Service: General   • COLONOSCOPY N/A 7/14/2020    Procedure: COLONOSCOPY WITH ANESTHESIA;  Surgeon: Anupam Morales DO;   Location: [facility] ENDOSCOPY;  Service: Gastroenterology;  Laterality: N/A;  pre: abdominal pain  post: diverticulosis  Vinayak Correia PA   • CORONARY ANGIOPLASTY     • CORONARY ARTERY BYPASS GRAFT N/A 7/6/2019    Procedure: CABG X2 WITH LIMA, LEFT LEG OVH, AND PLACEMENT OF LEFT FEMORAL ARTERIAL LINE;  Surgeon: Steven Tang MD;  Location: [facility] OR;  Service: Cardiothoracic   • CORONARY STENT PLACEMENT      x 6   • ENDOSCOPIC FUNCTIONAL SINUS SURGERY (FESS) Bilateral 12/13/2017    Procedure: PROCEDURE PERFORMED:  Bilateral functional endoscopic anterior ethmoidectomy with bilateral middle meatal antrostomy Septoplasty Right kathia bullosa resection Bilateral inferior turbinate reduction via Coblation;  Surgeon: Mayank Ibarra MD;  Location: [facility] OR;  Service:    • ENDOSCOPY N/A 7/30/2018    Procedure: ESOPHAGOGASTRODUODENOSCOPY WITH ANESTHESIA;  Surgeon: Benitez Mas MD;  Location: [facility] ENDOSCOPY;  Service: Gastroenterology   • ENDOSCOPY N/A 7/14/2020    Procedure: ESOPHAGOGASTRODUODENOSCOPY WITH ANESTHESIA;  Surgeon: Anupam Morales DO;  Location: [facility] ENDOSCOPY;  Service: Gastroenterology;  Laterality: N/A;  pre: abdominal pain  post: esophagitis  Vinayak Correia PA   • HERNIA REPAIR      x2 inguinal area   • KIDNEY STONE SURGERY     • KNEE SURGERY Right    • OTHER SURGICAL HISTORY      urolift   • PROSTATE SURGERY      Dr. Badillo - 2017   • ROTATOR CUFF REPAIR     • THUMB AMPUTATION Left     partial   • TOE AMPUTATION Right     big   ,   Family History   Problem Relation Age of Onset   • Heart disease Father    • COPD Mother    • Hypertension Mother    • Asthma Mother    • No Known Problems Sister    • Colon cancer Paternal Uncle    • Prostate cancer Maternal Grandfather    • No Known Problems Sister    • Colon cancer Maternal Grandmother    • Colon polyps Maternal Grandmother    ,   Social History     Tobacco Use   • Smoking status: Former Smoker     Packs/day: 1.50     Years:  4.50     Pack years: 6.75     Types: Cigarettes, Cigars     Quit date:      Years since quittin.7   • Smokeless tobacco: Former User     Types: Chew     Quit date:    Substance Use Topics   • Alcohol use: No   • Drug use: No   ,     Medications  Current Outpatient Medications   Medication Sig Dispense Refill   • albuterol (PROVENTIL HFA;VENTOLIN HFA) 108 (90 BASE) MCG/ACT inhaler Inhale 2 puffs Every 6 (Six) Hours As Needed for wheezing.     • apixaban (ELIQUIS) 5 MG tablet tablet Take 1 tablet by mouth Every 12 (Twelve) Hours. 180 tablet 3   • aspirin 81 MG chewable tablet Chew 81 mg Daily.     • calcium polycarbophil (FIBERCON) 625 MG tablet Take 625 mg by mouth Daily.     • carboxymethylcellulose (REFRESH PLUS) 0.5 % solution Administer 1 drop to both eyes 4 (Four) Times a Day As Needed for Dry Eyes.     • Empagliflozin (JARDIANCE) 10 MG tablet Take 1 tablet by mouth Daily.     • fluticasone (FLONASE) 50 MCG/ACT nasal spray 2 sprays into each nostril Daily.     • furosemide (LASIX) 40 MG tablet TAKE 1 TABLET BY MOUTH ONCE DAILY AS NEEDED FOR WEIGHT GAIN GREATER THAN 2 LBS IN A DAY OR INCREASING CHEST CONGESTION  0   • insulin aspart (novoLOG FLEXPEN) 100 UNIT/ML solution pen-injector sc pen Inject 30 Units under the skin into the appropriate area as directed Every Morning. 34 units  at breakfast, 30 units at lunch, 34 units hs     • Insulin Glargine (LANTUS SOLOSTAR) 100 UNIT/ML injection pen Inject 60-90 Units under the skin into the appropriate area as directed 2 (Two) Times a Day. 65 units QAM AND 70 units QPM     • isosorbide mononitrate (IMDUR) 30 MG 24 hr tablet Take 1 tablet by mouth Daily. 30 tablet 0   • lamoTRIgine (LaMICtal) 200 MG tablet Take 1 tablet by mouth 2 (Two) Times a Day. (Patient taking differently: Take 100 mg by mouth 2 (Two) Times a Day.) 180 tablet 1   • levETIRAcetam (KEPPRA) 500 MG tablet Take 3 tablets every morning, take 4 tablets every night. (Patient taking  differently: Take 1,500 mg by mouth Daily With Breakfast. Take 3 tablets every morning, take 4 tablets every night.) 630 tablet 1   • Liraglutide (VICTOZA SC) Inject 1.2 mg under the skin into the appropriate area as directed Every Night. 1.2     • lisinopril (PRINIVIL,ZESTRIL) 20 MG tablet Take 1 tablet by mouth Daily. 90 tablet 3   • metFORMIN (GLUCOPHAGE) 1000 MG tablet Take 1 tablet by mouth 2 (Two) Times a Day With Meals. HOLD x 48 hours post contrast. May resume 3/18/18     • methylPREDNISolone (MEDROL) 4 MG dose pack Take as directed on package instructions. 21 tablet 0   • Multiple Vitamin (MULTI VITAMIN PO) Take 1 tablet by mouth Daily.     • nitroglycerin (NITROSTAT) 0.4 MG SL tablet Place 0.4 mg under the tongue Every 5 (Five) Minutes As Needed for chest pain. Take no more than 3 doses in 15 minutes.     • ondansetron ODT (ZOFRAN-ODT) 4 MG disintegrating tablet Take 1 tablet by mouth Every 4 (Four) Hours As Needed for Nausea or Vomiting. 10 tablet 0   • pantoprazole (PROTONIX) 40 MG EC tablet Take 40 mg by mouth 2 (Two) Times a Day.     • psyllium (METAMUCIL) 58.6 % packet Take 1 packet by mouth Daily As Needed (constipation).     • amoxicillin-clavulanate (Augmentin) 875-125 MG per tablet Take 1 tablet by mouth Every 12 (Twelve) Hours. 20 tablet 0     No current facility-administered medications for this visit.        Allergies:  Flagyl [metronidazole], Atorvastatin, and Ciprofloxacin    Review of Systems  Review of Systems   Constitution: Positive for malaise/fatigue.   HENT: Negative.    Eyes: Negative.    Cardiovascular: Positive for dyspnea on exertion. Negative for chest pain, claudication, cyanosis, irregular heartbeat, leg swelling, near-syncope, orthopnea, palpitations, paroxysmal nocturnal dyspnea and syncope.   Respiratory: Negative.    Endocrine: Negative.    Hematologic/Lymphatic: Negative.    Skin: Negative.    Musculoskeletal: Positive for arthritis and back pain.   Gastrointestinal:  "Negative for anorexia.   Genitourinary: Negative.    Neurological: Positive for dizziness, light-headedness and weakness.   Psychiatric/Behavioral: Negative.        Objective     Physical Exam:      Vitals:    10/12/20 1005   BP: 118/80   Pulse: 86   SpO2: 98%   Weight: 112 kg (246 lb)   Height: 182.9 cm (72.01\")         Physical Exam   Constitutional: He appears well-developed.   HENT:   Head: Normocephalic.   Neck: Normal carotid pulses and no JVD present. No tracheal tenderness present. Carotid bruit is not present. No tracheal deviation and no edema present.   Cardiovascular: Regular rhythm, normal heart sounds and normal pulses.   Pulmonary/Chest: Effort normal. No stridor.   Abdominal: Soft. He exhibits no distension. There is no abdominal tenderness.   Neurological: He is alert. No cranial nerve deficit or sensory deficit.   Skin: Skin is warm.   Psychiatric: His speech is normal and behavior is normal.       Results Review:    Cardiac CT angiography 9/16/2020     Impression:      1. Total calcium score : 3345.6  2.  Patent left internal mammary artery graft.  Severe stenosis of the mid left anterior descending coronary artery where there are earlier implanted stents  3.  Suspected more than 50% stenosis of the left anterior descending coronary artery after touchdown site of the left internal mammary artery graft  4.  Severe stenosis of proximal first obtuse marginal branch  5.   Patent saphenous venous graft to the first obtuse marginal branch with suspected more than 50% stenosis at the touchdown site.  Significant coronary calcification preventing good visualization of the anastomotic site.  6.   50 to 60% stenosis of the right posterior descending coronary artery        ___________________________________________________________________________     Recommendations:     Ongoing risk factor modifications  Further evaluation if ongoing chest pain or high risk of suspicion of significant ischemic heart " disease          Carlos A Boyer Jr.   Regadenoson Stress Test With Myocardial Perfusion SPECT (Multi Study)   Order# [id number]   Reading physician: Wade Ramey MD Ordering physician: Wade Ramey MD Study date: 20   Patient Information     Patient Name  Carlos A Boyer Jr. MRN  [id number] Sex  Male  (Age)  1960 (60 y.o.)   Interpretation Summary     · Left ventricular ejection fraction is normal (Calculated EF = 54%).  · Myocardial perfusion imaging indicates a medium-sized infarct located in the anterior wall and apex with no significant ischemia noted.  · Diaphragmatic attenuation and GI artifacts are present.  · Raw images reviewed with the following abnormalities noted: vertical motion.           Conclusion of cardiac catheterization     Left main coronary arteries normal  Left anterior descending coronary artery has patent stents  The distal edge of the stent in the mid left anterior descending coronary artery has approximately 60% stenosis  Highly abnormal fractional flow reserve of this at 0.78 without adenosine stress.  PTCA done of the segment.  Despite multiple attempts and use of guide liner could not advance the stent due to earlier implanted stents.  Left circumflex coronary artery is codominant and large  The proximal portion of the first obtuse marginal branch has a 60% stenosis with normal fractional flow reserve above 0.85.  Right coronary artery is large and codominant without any high-grade lesions.     Left ventricular end-diastolic pressure is mildly elevated to 15 mmHg.  No gradient across aortic valve on pullback.  Left ventriculography shows a hyperdynamic left ventricle with ejection fraction of 75%.     Percutaneous coronary intervention     XB 3.5 guide advised used for fractional flow reserve of the obtuse marginal branch as well as of the left anterior descending coronary artery  Then we did try to advance a 2.5 mm stent.  We used 2 different size stents and could not  cross  We used a guide liner and does not did not have placed cross across the stent.  We then did balloon angioplasty using 2.0 mm balloon.  Stenosis was reduced from 60% down to less than 10%.  BEVERLEY-3 flow before and after procedure.           ____________________________________________________________________________________________________________________________________________     Plan after cardiac catheterization        Dual antiplatelet therapy minimum of 1 year preferably longer  Statin ACE inhibitors beta-blockers  If there is repeat restenosis of the left anterior descending coronary artery would consider surgical revascularization.  Target LDL less than 70 mg/dL.  Maximize antianginal therapy.  Intensive risk factor modifications for both primary and secondary prevention if applicable  Hydration   Observation     Results for orders placed during the hospital encounter of 09/05/19   Adult Transesophageal Echo (CARLOS) W/ Cont if Necessary Per Protocol    Narrative · No evidence of left atrial appendage thrombus - okay to proceed with   cardioversion.  · Left ventricular systolic function is normal.  · Moderate mitral valve regurgitation is present  · Mild aortic insufficiency.  · Normal size and function of the right ventricle.  · Negative bubble study.      10/15/18        Conclusion     The left main coronary artery is normal  Left anterior descending coronary artery and diagonal branches are patent with a widely patent stent noted in the  left anterior descending coronary artery    Mid left anterior descending coronary artery is a 50% stenosis and hemodynamically not significant.  Left circumflex coronary artery is large with a first obtuse marginal around 40-60% stenosis.  Fractional flow reserve of this is completely normal and at above 0.9.  Left circumflex coronary artery is a codominant.  Right coronary artery is codominant large with distal small vessel disease.     Left ventricular end-diastolic  pressure is normal at 11 mmHg no gradient across aortic valve on pullback  Left ventriculography shows ejection fraction of 55%.           Plan     Continue current medication  Symptomatic treatment for small vessel disease with antianginals  If stable can be discharged home later today  Intensive risk factor modifications for both primary and secondary prevention if applicable  Hydration   Observation         7/17 cath  Conclusion  Nonocclusive epicardial coronary arteries with widely patent stents in the  left anterior descending coronary artery   artery and left circumflex coronary artery.  Hyperdynamic left ventricle with ejection fraction of 75%.  LVEDP   17    mm Hg     Plan  Workup for noncardiac causes of chest pain this can be done as outpatient.  His d-dimer is within normal range  After a period of observation of stable can be discharged either later today.  Keep follow-up appointment with me  Ongoing risk factor modifications keep LDL below 70 mg/dL  Hydration   Observation       ECG 12 Lead    Date/Time: 10/12/2020 10:24 AM  Performed by: Wade Ramey MD  Authorized by: Wade Ramey MD   Comparison: compared with previous ECG from 8/31/2020  Similar to previous ECG  Rhythm: sinus rhythm  Rate: normal  Conduction: 1st degree AV block  Q waves: V1, V2 and V3    QRS axis: normal  Other findings: non-specific ST-T wave changes    Clinical impression: abnormal EKG            Assessment/Plan   Patient Active Problem List   Diagnosis   • Type 2 diabetes mellitus without complication, with long-term current use of insulin (CMS/[facility])   • Gastroesophageal reflux disease without esophagitis   • Essential hypertension   • Seizure disorder (CMS/[facility])   • Carotid disease, bilateral (CMS/[facility])   • Coronary artery disease involving native coronary artery of native heart with angina pectoris (CMS/[facility])   • Mixed hyperlipidemia   • Chronic left arterial ischemic stroke, ICA (internal carotid artery)   • HOA on CPAP   •  Benign non-nodular prostatic hyperplasia with lower urinary tract symptoms   • Deviated nasal septum   • Maribell bullosa   • Hypertrophy of both inferior nasal turbinates   • Chronic sinusitis of both maxillary sinuses   • S/P FESS (functional endoscopic sinus surgery)   • Diabetic complication (CMS/HCC)   • Epigastric pain   • Diabetic peripheral neuropathy (CMS/HCC)   • Abnormal stress test   • Stenosis of left carotid artery   • Class 1 obesity in adult   • 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CMS/HCC)   • History of seizure   • Lung nodules   • Cardiac murmur   • Thoracic aortic aneurysm without rupture (CMS/HCC)   • Paroxysmal atrial fibrillation (CMS/HCC)   • Atrial flutter (CMS/HCC) with cardioversion on Sept 5, 2019   • Acute diastolic congestive heart failure (CMS/HCC)   • Fluid overload   • Pleural effusion   • History of hyperlipidemia   • Respiratory distress   • Long-term use of high-risk medication   • Dysphagia   • Left lower quadrant abdominal pain   • Chest pain   • COVID-19 virus detected   • Shortness of breath       ____________________________________________________________________________________________________________________________________________  Health maintenance and recommendations      Offered to give patient  a copy      Questions were encouraged, asked and answered to the patient's  understanding and satisfaction. Questions if any regarding current medications and side effects, need for refills and importance of compliance to medications stressed.    Reviewed available prior notes, consults, prior visits, laboratory findings, radiology and cardiology relevant reports. Updated chart as applicable. I have reviewed the patient's medical history in detail and updated the computerized patient record as relevant.      Updated patient regarding any new or relevant abnormalities on review of records or any new findings on physical exam. Mentioned to patient about purpose of visit and  desirable health short and long term goals and objectives.    Primary to monitor CBC CMP Lipid panel and TSH as applicable    ___________________________________________________________________________________________________________________________________________      Plan  Discussed results of cardiac CTA   Patient expressed understanding  Encouraged and answered all questions   Discussed with the patient and all questioned fully answered. He will call me if any problems arise.      The current medical regimen is effective;  continue present plan and medications.     Keep A1c less than 7 Primary to monitor  Keep LDL below 70 mg/dl. Monitor liver and renal functions.   Monitor CBC, CMP, TSH (as indicated) and Lipid Panel by primary      Monitor for any signs of bleeding including red or dark stools. Fall precautions.   Patient is asked to monitor BP at home or work, several times per month and return with written values at next office visit.     No additional cardiac testing required at this point in time given no exertional chest pain   CCTA does show some areas of  moderate or higher stenosis with significant calcification preventing good visualization due to blooming artifact    S/L NTG PRN for chest pain, call me or go to ER if has to use S/L nitroglycerin       Return in about 3 months (around 1/12/2021).

## 2020-10-21 ENCOUNTER — APPOINTMENT (OUTPATIENT)
Dept: CT IMAGING | Facility: HOSPITAL | Age: 60
End: 2020-10-21

## 2020-10-21 ENCOUNTER — HOSPITAL ENCOUNTER (EMERGENCY)
Facility: HOSPITAL | Age: 60
Discharge: HOME | End: 2020-10-21
Admitting: EMERGENCY MEDICINE

## 2020-10-21 VITALS
RESPIRATION RATE: 18 BRPM | DIASTOLIC BLOOD PRESSURE: 86 MMHG | HEART RATE: 71 BPM | BODY MASS INDEX: 34.13 KG/M2 | SYSTOLIC BLOOD PRESSURE: 139 MMHG | TEMPERATURE: 97.5 F | HEIGHT: 72 IN | WEIGHT: 252 LBS | OXYGEN SATURATION: 95 %

## 2020-10-21 DIAGNOSIS — R10.9 FLANK PAIN: Primary | ICD-10-CM

## 2020-10-21 LAB
ALBUMIN SERPL-MCNC: 4.6 G/DL (ref 3.5–5.2)
ALBUMIN/GLOB SERPL: 1.7 G/DL
ALP SERPL-CCNC: 108 U/L (ref 39–117)
ALT SERPL W P-5'-P-CCNC: 33 U/L (ref 1–41)
ANION GAP SERPL CALCULATED.3IONS-SCNC: 9 MMOL/L (ref 5–15)
APTT PPP: 28.1 SECONDS (ref 24.1–35)
ARTERIAL PATENCY WRIST A: ABNORMAL
AST SERPL-CCNC: 22 U/L (ref 1–40)
ATMOSPHERIC PRESS: 755 MMHG
BASE EXCESS BLDA CALC-SCNC: -0.7 MMOL/L (ref 0–2)
BASOPHILS # BLD AUTO: 0.03 10*3/MM3 (ref 0–0.2)
BASOPHILS NFR BLD AUTO: 0.6 % (ref 0–1.5)
BDY SITE: ABNORMAL
BILIRUB SERPL-MCNC: 0.7 MG/DL (ref 0–1.2)
BILIRUB UR QL STRIP: NEGATIVE
BODY TEMPERATURE: 37 C
BUN SERPL-MCNC: 15 MG/DL (ref 8–23)
BUN/CREAT SERPL: 17 (ref 7–25)
CALCIUM SPEC-SCNC: 10.2 MG/DL (ref 8.6–10.5)
CHLORIDE SERPL-SCNC: 102 MMOL/L (ref 98–107)
CLARITY UR: CLEAR
CO2 SERPL-SCNC: 24 MMOL/L (ref 22–29)
COLOR UR: YELLOW
CREAT SERPL-MCNC: 0.88 MG/DL (ref 0.76–1.27)
DEPRECATED RDW RBC AUTO: 39.7 FL (ref 37–54)
EOSINOPHIL # BLD AUTO: 0.2 10*3/MM3 (ref 0–0.4)
EOSINOPHIL NFR BLD AUTO: 4 % (ref 0.3–6.2)
ERYTHROCYTE [DISTWIDTH] IN BLOOD BY AUTOMATED COUNT: 12.6 % (ref 12.3–15.4)
GAS FLOW AIRWAY: 2 LPM
GFR SERPL CREATININE-BSD FRML MDRD: 88 ML/MIN/1.73
GLOBULIN UR ELPH-MCNC: 2.7 GM/DL
GLUCOSE SERPL-MCNC: 239 MG/DL (ref 65–99)
GLUCOSE UR STRIP-MCNC: ABNORMAL MG/DL
HCO3 BLDA-SCNC: 22.8 MMOL/L (ref 20–26)
HCT VFR BLD AUTO: 40.9 % (ref 37.5–51)
HGB BLD-MCNC: 14 G/DL (ref 13–17.7)
HGB UR QL STRIP.AUTO: NEGATIVE
INR PPP: 0.98 (ref 0.91–1.09)
KETONES UR QL STRIP: NEGATIVE
LEUKOCYTE ESTERASE UR QL STRIP.AUTO: NEGATIVE
LYMPHOCYTES # BLD AUTO: 1.73 10*3/MM3 (ref 0.7–3.1)
LYMPHOCYTES NFR BLD AUTO: 34.7 % (ref 19.6–45.3)
Lab: ABNORMAL
MCH RBC QN AUTO: 29.4 PG (ref 26.6–33)
MCHC RBC AUTO-ENTMCNC: 34.2 G/DL (ref 31.5–35.7)
MCV RBC AUTO: 85.9 FL (ref 79–97)
MODALITY: ABNORMAL
MONOCYTES # BLD AUTO: 0.38 10*3/MM3 (ref 0.1–0.9)
MONOCYTES NFR BLD AUTO: 7.6 % (ref 5–12)
NEUTROPHILS NFR BLD AUTO: 2.62 10*3/MM3 (ref 1.7–7)
NEUTROPHILS NFR BLD AUTO: 52.5 % (ref 42.7–76)
NITRITE UR QL STRIP: NEGATIVE
NT-PROBNP SERPL-MCNC: 140.5 PG/ML (ref 0–900)
PCO2 BLDA: 33.5 MM HG (ref 35–45)
PCO2 TEMP ADJ BLD: 33.5 MM HG (ref 35–45)
PH BLDA: 7.44 PH UNITS (ref 7.35–7.45)
PH UR STRIP.AUTO: 6 [PH] (ref 5–8)
PH, TEMP CORRECTED: 7.44 PH UNITS (ref 7.35–7.45)
PLATELET # BLD AUTO: 119 10*3/MM3 (ref 140–450)
PMV BLD AUTO: 11.3 FL (ref 6–12)
PO2 BLDA: 114 MM HG (ref 83–108)
PO2 TEMP ADJ BLD: 114 MM HG (ref 83–108)
POTASSIUM SERPL-SCNC: 4.6 MMOL/L (ref 3.5–5.2)
PROT SERPL-MCNC: 7.3 G/DL (ref 6–8.5)
PROT UR QL STRIP: NEGATIVE
PROTHROMBIN TIME: 12.6 SECONDS (ref 11.9–14.6)
RBC # BLD AUTO: 4.76 10*6/MM3 (ref 4.14–5.8)
SAO2 % BLDCOA: 98.8 % (ref 94–99)
SARS-COV-2 RDRP RESP QL NAA+PROBE: NOT DETECTED
SODIUM SERPL-SCNC: 135 MMOL/L (ref 136–145)
SP GR UR STRIP: >1.03 (ref 1–1.03)
TROPONIN T SERPL-MCNC: <0.01 NG/ML (ref 0–0.03)
UROBILINOGEN UR QL STRIP: ABNORMAL
VENTILATOR MODE: ABNORMAL
WBC # BLD AUTO: 4.99 10*3/MM3 (ref 3.4–10.8)

## 2020-10-21 PROCEDURE — 82803 BLOOD GASES ANY COMBINATION: CPT

## 2020-10-21 PROCEDURE — 85610 PROTHROMBIN TIME: CPT | Performed by: NURSE PRACTITIONER

## 2020-10-21 PROCEDURE — 84484 ASSAY OF TROPONIN QUANT: CPT | Performed by: NURSE PRACTITIONER

## 2020-10-21 PROCEDURE — 36600 WITHDRAWAL OF ARTERIAL BLOOD: CPT

## 2020-10-21 PROCEDURE — 99283 EMERGENCY DEPT VISIT LOW MDM: CPT

## 2020-10-21 PROCEDURE — 74177 CT ABD & PELVIS W/CONTRAST: CPT

## 2020-10-21 PROCEDURE — 85025 COMPLETE CBC W/AUTO DIFF WBC: CPT | Performed by: NURSE PRACTITIONER

## 2020-10-21 PROCEDURE — 85730 THROMBOPLASTIN TIME PARTIAL: CPT | Performed by: NURSE PRACTITIONER

## 2020-10-21 PROCEDURE — 71275 CT ANGIOGRAPHY CHEST: CPT

## 2020-10-21 PROCEDURE — 99284 EMERGENCY DEPT VISIT MOD MDM: CPT

## 2020-10-21 PROCEDURE — 83880 ASSAY OF NATRIURETIC PEPTIDE: CPT | Performed by: NURSE PRACTITIONER

## 2020-10-21 PROCEDURE — 80053 COMPREHEN METABOLIC PANEL: CPT | Performed by: NURSE PRACTITIONER

## 2020-10-21 PROCEDURE — 81003 URINALYSIS AUTO W/O SCOPE: CPT | Performed by: NURSE PRACTITIONER

## 2020-10-21 PROCEDURE — 93005 ELECTROCARDIOGRAM TRACING: CPT | Performed by: NURSE PRACTITIONER

## 2020-10-21 PROCEDURE — 0 IOPAMIDOL PER 1 ML: Performed by: NURSE PRACTITIONER

## 2020-10-21 PROCEDURE — 87635 SARS-COV-2 COVID-19 AMP PRB: CPT | Performed by: NURSE PRACTITIONER

## 2020-10-21 PROCEDURE — 93010 ELECTROCARDIOGRAM REPORT: CPT | Performed by: INTERNAL MEDICINE

## 2020-10-21 RX ORDER — SODIUM CHLORIDE 0.9 % (FLUSH) 0.9 %
10 SYRINGE (ML) INJECTION AS NEEDED
Status: DISCONTINUED | OUTPATIENT
Start: 2020-10-21 | End: 2020-10-21 | Stop reason: HOSPADM

## 2020-10-21 RX ADMIN — IOPAMIDOL 100 ML: 755 INJECTION, SOLUTION INTRAVENOUS at 10:52

## 2020-10-21 RX ADMIN — SODIUM CHLORIDE 500 ML: 9 INJECTION, SOLUTION INTRAVENOUS at 09:35

## 2020-10-21 NOTE — ED PROVIDER NOTES
Subjective   Patient is a 60-year-old male that presents to the ER today with complaint of left flank pain and left lower quadrant pain.  The patient states that his symptoms began yesterday.  He states that initially he was having trouble urinating.  He states that he was able to urinate bu felt like he was not completely emptying his bladder.  He states that he was having urinary frequency as well.  Patient states that he had no known blood in his urine.  Patient states that night he developed left flank pain.  He states that the pain is coming around to his left lower quadrant.  He states that he has a history of kidney stones in the past.  Patient went to urgent care with a sit to the ER for further evaluation.  He denies any nausea or vomiting.    Has a significant cardiac history.  He is on Eliquis for atrial fibrillation.  The patient denies any chest pain or shortness of breath.  He denies any fever.  Patient presents here today for further evaluation.      History provided by:  Patient   used: No    Abdominal Pain  Pain location:  L flank  Pain quality: aching and cramping    Pain radiates to:  LLQ  Pain severity:  Mild  Onset quality:  Sudden  Duration:  1 day  Timing:  Constant  Progression:  Worsening  Chronicity:  New  Context: not alcohol use, not awakening from sleep, not diet changes, not eating, not laxative use, not medication withdrawal, not previous surgeries, not recent illness, not recent sexual activity, not recent travel, not retching, not sick contacts, not suspicious food intake and not trauma    Relieved by:  Nothing  Worsened by:  Nothing  Ineffective treatments:  None tried  Associated symptoms: no anorexia, no belching, no chest pain, no chills, no constipation, no cough, no diarrhea, no dysuria, no fatigue, no fever, no flatus, no hematemesis, no hematochezia, no hematuria, no melena, no nausea, no shortness of breath, no sore throat, no vaginal bleeding, no vaginal  discharge and no vomiting    Risk factors: no alcohol abuse, no aspirin use, not elderly, has not had multiple surgeries, no NSAID use, not obese, not pregnant and no recent hospitalization        Review of Systems   Constitutional: Negative for chills, fatigue and fever.   HENT: Negative for sore throat.    Respiratory: Negative for cough and shortness of breath.    Cardiovascular: Negative for chest pain.   Gastrointestinal: Positive for abdominal pain. Negative for anorexia, constipation, diarrhea, flatus, hematemesis, hematochezia, melena, nausea and vomiting.   Genitourinary: Negative for dysuria, hematuria, vaginal bleeding and vaginal discharge.   All other systems reviewed and are negative.      Past Medical History:   Diagnosis Date   • Arthritis    • Asthma    • Cancer (CMS/HCC)    • Carotid disease, bilateral (CMS/HCC)    • Chest pain    • Chronic ischemic heart disease    • Chronic sinusitis    • Maribell bullosa    • Coronary artery disease    • Deviated septum    • Diabetes mellitus (CMS/HCC)    • Difficulty urinating    • Diverticulitis    • Enlarged prostate    • Fatty liver    • GERD (gastroesophageal reflux disease)    • Heart disease    • Hyperlipidemia    • Hypertension    • Hypertrophy of nasal turbinates    • Keratoderma    • Kidney stone    • Migraine    • Murmur, heart    • Myocardial infarction (CMS/HCC)    • Obesity    • PONV (postoperative nausea and vomiting)    • Psoriasis    • Seizures (CMS/HCC)    • Sinus congestion    • Skin cancer    • Sleep apnea    • SOB (shortness of breath)    • Stroke (CMS/HCC)    • UTI (urinary tract infection)        Allergies   Allergen Reactions   • Flagyl [Metronidazole] Hives   • Atorvastatin Other (See Comments)     Muscle cramps   • Ciprofloxacin Hives       Past Surgical History:   Procedure Laterality Date   • CARDIAC CATHETERIZATION  01/2016    Dr. Broadbent; widely patent previously placed stents in the left anterior descending and obstructive disease  involving the diagonal branch which was treated medically   • CARDIAC CATHETERIZATION N/A 7/14/2017    Procedure: Left Heart Cath;  Surgeon: Wade Ramey MD;  Location:  [facility] CATH [facility]AS[facility];  Service:    • CARDIAC CATHETERIZATION Left 10/15/2018    Procedure: Cardiac Catheterization/Vascular Study;  Surgeon: Wade Ramey MD;  Location:  [facility];  Service: Cardiology   • CARDIAC CATHETERIZATION  10/15/2018    Procedure: Functional Flow [place];  Surgeon: Wade Ramey MD;  Location:  [facility];  Service: Cardiology   • CARDIAC CATHETERIZATION N/A 10/15/2018    Procedure: Left ventriculography;  Surgeon: Wade Ramey MD;  Location:  [facility]ATH [facility];  Service: Cardiology   • CARDIAC CATHETERIZATION Left 6/26/2019    Procedure: Cardiac Catheterization/Vascular Study VEL OK  HE WILL WAIT 1 YEAR FOR SHOULDER SURGERY ;  Surgeon: Wade Ramey MD;  Location: [facility] CATH INVASIVE LOCATION;  Service: Cardiology   • CHOLECYSTECTOMY     • CHOLECYSTECTOMY WITH INTRAOPERATIVE CHOLANGIOGRAM N/A 8/1/2018    Procedure: CHOLECYSTECTOMY LAPAROSCOPIC INTRAOPERATIVE CHOLANGIOGRAM;  Surgeon: Shane Ann MD;  Location: [facility] OR;  Service: General   • COLONOSCOPY N/A 7/14/2020    Procedure: COLONOSCOPY WITH ANESTHESIA;  Surgeon: Anupam Morales DO;  Location: [facility] ENDOSCOPY;  Service: Gastroenterology;  Laterality: N/A;  pre: abdominal pain  post: diverticulosis  Vinayak Correia PA   • CORONARY ANGIOPLASTY     • CORONARY ARTERY BYPASS GRAFT N/A 7/6/2019    Procedure: CABG X2 WITH LIMA, LEFT LEG OVH, AND PLACEMENT OF LEFT FEMORAL ARTERIAL LINE;  Surgeon: Steven Tang MD;  Location: [facility] OR;  Service: Cardiothoracic   • CORONARY STENT PLACEMENT      x 6   • ENDOSCOPIC FUNCTIONAL SINUS SURGERY (FESS) Bilateral 12/13/2017    Procedure: PROCEDURE PERFORMED:  Bilateral functional endoscopic anterior ethmoidectomy with bilateral middle meatal antrostomy  Septoplasty Right kathia bullosa resection Bilateral inferior turbinate reduction via Coblation;  Surgeon: Mayank Ibarra MD;  Location: [facility] OR;  Service:    • ENDOSCOPY N/A 2018    Procedure: ESOPHAGOGASTRODUODENOSCOPY WITH ANESTHESIA;  Surgeon: Benitez Mas MD;  Location: [facility] ENDOSCOPY;  Service: Gastroenterology   • ENDOSCOPY N/A 2020    Procedure: ESOPHAGOGASTRODUODENOSCOPY WITH ANESTHESIA;  Surgeon: Anupam Morales DO;  Location: [facility] ENDOSCOPY;  Service: Gastroenterology;  Laterality: N/A;  pre: abdominal pain  post: esophagitis  Vinayak Correia PA   • HERNIA REPAIR      x2 inguinal area   • KIDNEY STONE SURGERY     • KNEE SURGERY Right    • OTHER SURGICAL HISTORY      urolift   • PROSTATE SURGERY      Dr. Badillo -    • ROTATOR CUFF REPAIR     • THUMB AMPUTATION Left     partial   • TOE AMPUTATION Right     big       Family History   Problem Relation Age of Onset   • Heart disease Father    • COPD Mother    • Hypertension Mother    • Asthma Mother    • No Known Problems Sister    • Colon cancer Paternal Uncle    • Prostate cancer Maternal Grandfather    • No Known Problems Sister    • Colon cancer Maternal Grandmother    • Colon polyps Maternal Grandmother        Social History     Socioeconomic History   • Marital status:      Spouse name: Not on file   • Number of children: Not on file   • Years of education: Not on file   • Highest education level: Not on file   Tobacco Use   • Smoking status: Former Smoker     Packs/day: 1.50     Years: 4.50     Pack years: 6.75     Types: Cigarettes, Cigars     Quit date:      Years since quittin.8   • Smokeless tobacco: Former User     Types: Chew     Quit date:    Substance and Sexual Activity   • Alcohol use: No   • Drug use: No   • Sexual activity: Defer           Objective   Physical Exam  Vitals signs and nursing note reviewed.   Constitutional:       Appearance: Normal appearance.   HENT:      Head:  Normocephalic and atraumatic.   Eyes:      Conjunctiva/sclera: Conjunctivae normal.   Cardiovascular:      Rate and Rhythm: Normal rate and regular rhythm.   Pulmonary:      Effort: Pulmonary effort is normal.      Breath sounds: Normal breath sounds.   Abdominal:      General: Bowel sounds are normal.      Palpations: Abdomen is soft.      Tenderness: There is abdominal tenderness in the left lower quadrant. There is left CVA tenderness.   Skin:     General: Skin is warm and dry.      Capillary Refill: Capillary refill takes less than 2 seconds.   Neurological:      General: No focal deficit present.      Mental Status: He is alert.   Psychiatric:         Mood and Affect: Mood normal.         Procedures           ED Course  ED Course as of Oct 22 0822   Wed Oct 21, 2020   0946 Pt has declined pain medication at this time.    [LF]   u Oct 22, 2020   0820 Patient is a 60-year-old male that presented to the ER today with complaint of leg pain.  He had does have a history of kidney stones.  While here the nursing staff informed me that his O2 sat was 79%.  Patient had no chest pain, he had no shortness of breath.  They did go ahead and put him on O2.  I believe that this is a erroneous reading.  Patient has been off of O2 and has walked around to the ER and his O2 sat never dropped below 95%.  The patient himself states that he was apprised to see this reading.  Again I do not believe that this was an accurate reading.  There is only one reading that was this low.  I did go ahead and check an ABG.  I did a CTA chest on the patient.  He has a known thoracic aneurysm of 4.2 cm.  The patient's wife states that he is already aware of this.  There was no other abnormalities noted.  CT scan of the abdomen and pelvis showed no acute abnormalities.  The rest of the patient's labs were unremarkable.  Patient had declined pain medicine while in the ER today.  At this time recommended that [facility].   The patient states that he does have a teleconference set up for later this week.  Advised him to return to the ER for any new or worsening symptoms.  Again the patient is nontoxic-appearing.  When I went to reevaluate him he was already dressed sitting on the bed.  The patient is in no acute distress and will be discharged home at this time stable condition.  He has had no shortness of breath or chest pain.  He was able to ambulate in the ER today.  He will be discharged home at this time stable condition.    [LF]   0821 Patient's EKG was abnormal today however this was reviewed by Dr. Guidry and there was no new changes on the EKG based on previous EKGs in the past.    [LF]      ED Course User Index  [LF] Mervat Choe, APRN                                   CT Abdomen Pelvis With Contrast   Final Result      CT Angiogram Chest   Final Result   1. No acute cardiopulmonary finding. Specifically, no evidence of   pulmonary embolism.   2. Enlarged pulmonary artery, which can be seen with pulmonary artery   hypertension.   3. Borderline heart size with coronary artery calcifications and   probable LAD stent.   4. Ascending thoracic aorta measures up to 4.3 cm.   This report was finalized on 10/21/2020 11:24 by Dr Mari Melton MD.        Labs Reviewed   COMPREHENSIVE METABOLIC PANEL - Abnormal; Notable for the following components:       Result Value    Glucose 239 (*)     Sodium 135 (*)     All other components within normal limits    Narrative:     GFR Normal >60  Chronic Kidney Disease <60  Kidney Failure <15     URINALYSIS W/ MICROSCOPIC IF INDICATED (NO CULTURE) - Abnormal; Notable for the following components:    Specific Gravity, UA >1.030 (*)     Glucose, UA >=1000 mg/dL (3+) (*)     All other components within normal limits    Narrative:     Urine microscopic not indicated.   CBC WITH AUTO DIFFERENTIAL - Abnormal; Notable for the following components:    Platelets 119 (*)     All other components  within normal limits   BLOOD GAS, ARTERIAL - Abnormal; Notable for the following components:    pCO2, Arterial 33.5 (*)     pO2, Arterial 114.0 (*)     Base Excess, Arterial -0.7 (*)     pCO2, Temperature Corrected 33.5 (*)     pO2, Temperature Corrected 114 (*)     All other components within normal limits   COVID-19,ABBOTT IN-HOUSE,NP SWAB (NO TRANSPORT MEDIA) 2 HR TAT - Normal    Narrative:     Fact sheet for providers: https://www.fda.gov/media/763224/download     Fact sheet for patients: https://www.fda.gov/media/294135/download   PROTIME-INR - Normal   APTT - Normal   TROPONIN (IN-HOUSE) - Normal    Narrative:     Troponin T Reference Range:  <= 0.03 ng/mL-   Negative for AMI  >0.03 ng/mL-     Abnormal for myocardial necrosis.  Clinicians would have to utilize clinical acumen, EKG, Troponin and serial changes to determine if it is an Acute Myocardial Infarction or myocardial injury due to an underlying chronic condition.       Results may be falsely decreased if patient taking Biotin.     BNP (IN-HOUSE) - Normal    Narrative:     Among patients with dyspnea, NT-proBNP is highly sensitive for the detection of acute congestive heart failure. In addition NT-proBNP of <300 pg/ml effectively rules out acute congestive heart failure with 99% negative predictive value.    Results may be falsely decreased if patient taking Biotin.     COVID PRE-OP / PRE-PROCEDURE SCREENING ORDER (NO ISOLATION)    Narrative:     The following orders were created for panel order COVID PRE-OP / PRE-PROCEDURE SCREENING ORDER (NO ISOLATION) - Swab, Nasal Cavity.  Procedure                               Abnormality         Status                     ---------                               -----------         ------                     COVID-19, ABBOTT IN-HOUS...[[id number]]  Normal              Final result                 Please view results for these tests on the individual orders.   BLOOD GAS, ARTERIAL   CBC AND DIFFERENTIAL    Narrative:      The following orders were created for panel order CBC & Differential.  Procedure                               Abnormality         Status                     ---------                               -----------         ------                     CBC Auto Differential[[id number]]        Abnormal            Final result                 Please view results for these tests on the individual orders.             MDM  Number of Diagnoses or Management Options  Flank pain: new and requires workup     Amount and/or Complexity of Data Reviewed  Clinical lab tests: ordered and reviewed  Tests in the radiology section of CPT®: ordered and reviewed  Tests in the medicine section of CPT®: ordered and reviewed  Decide to obtain previous medical records or to obtain history from someone other than the patient: yes  Discuss the patient with other providers: yes    Patient Progress  Patient progress: stable      Final diagnoses:   Flank pain            Mervat Choe, APRN  10/22/20 0819

## 2020-11-16 ENCOUNTER — OFFICE VISIT (OUTPATIENT)
Dept: NEUROLOGY | Facility: CLINIC | Age: 60
End: 2020-11-16

## 2020-11-16 VITALS
OXYGEN SATURATION: 98 % | BODY MASS INDEX: 34.21 KG/M2 | SYSTOLIC BLOOD PRESSURE: 130 MMHG | WEIGHT: 252.6 LBS | DIASTOLIC BLOOD PRESSURE: 82 MMHG | HEIGHT: 72 IN | HEART RATE: 80 BPM

## 2020-11-16 DIAGNOSIS — G40.909 SEIZURE DISORDER (HCC): ICD-10-CM

## 2020-11-16 DIAGNOSIS — G63 POLYNEUROPATHY ASSOCIATED WITH UNDERLYING DISEASE (HCC): Primary | ICD-10-CM

## 2020-11-16 PROCEDURE — 99213 OFFICE O/P EST LOW 20 MIN: CPT | Performed by: PHYSICIAN ASSISTANT

## 2020-11-16 RX ORDER — GABAPENTIN 300 MG/1
300 CAPSULE ORAL NIGHTLY
Qty: 90 CAPSULE | Refills: 0 | Status: SHIPPED | OUTPATIENT
Start: 2020-11-16 | End: 2021-03-01 | Stop reason: SDUPTHER

## 2020-11-16 NOTE — PROGRESS NOTES
Neurology Progress Note      Chief Complaint:    Seizure disorder  Peripheral neuropathy    Subjective     Subjective:  This is a 60-year-old right-hand-dominant male routinely cared for by Vinayak Heath PA-C, at [facility], who has a history of seizure disorder.  Since last being seen on 11/14/2019, the patient has had no breakthrough seizure.  He continues to be managed with Keppra 1500 mg in the morning and 2000 mg at night, Lamictal 100 mg twice daily.  He has had no new focal neurologic events and no other sequela from his seizure disorder.  The patient is noncompliant with his CPAP therapy as he was unable to tolerate this.  He sleeps with his bed elevated.  He also states that gabapentin 100 mg nightly has been helping with his peripheral neuropathy, however, it does not seem to be helping at this time.  [facility]      Past Medical History:   Diagnosis Date   • Arthritis    • Asthma    • Cancer (CMS/HCC)    • Carotid disease, bilateral (CMS/HCC)    • Chest pain    • Chronic ischemic heart disease    • Chronic sinusitis    • Maribell bullosa    • Coronary artery disease    • Deviated septum    • Diabetes mellitus (CMS/HCC)    • Difficulty urinating    • Diverticulitis    • Enlarged prostate    • Fatty liver    • GERD (gastroesophageal reflux disease)    • Heart disease    • Hyperlipidemia    • Hypertension    • Hypertrophy of nasal turbinates    • Keratoderma    • Kidney stone    • Migraine    • Murmur, heart    • Myocardial infarction (CMS/HCC)    • Obesity    • PONV (postoperative nausea and vomiting)    • Psoriasis    • Seizures (CMS/HCC)    • Sinus congestion    • Skin cancer    • Sleep apnea    • SOB (shortness of breath)    • Stroke (CMS/HCC)    • UTI (urinary tract infection)      Past Surgical History:   Procedure Laterality Date   • CARDIAC CATHETERIZATION  01/2016    Dr. Broadbent; widely patent previously placed stents in the left anterior descending and  obstructive disease involving the diagonal branch which was treated medically   • CARDIAC CATHETERIZATION N/A 7/14/2017    Procedure: Left Heart Cath;  Surgeon: Wade Ramey MD;  Location:  [facility] CATH INVASIVE LOCATION;  Service:    • CARDIAC CATHETERIZATION Left 10/15/2018    Procedure: Cardiac Catheterization/Vascular Study;  Surgeon: Wade Ramey MD;  Location:  [facility] CATH [facility];  Service: Cardiology   • CARDIAC CATHETERIZATION  10/15/2018    Procedure: Functional Flow Lawson;  Surgeon: Wade Ramey MD;  Location:  [facility] CATH INV[facility];  Service: Cardiology   • CARDIAC CATHETERIZATION N/A 10/15/2018    Procedure: Left ventriculography;  Surgeon: Wade Ramey MD;  Location:  [facility] CATH INV[facility];  Service: Cardiology   • CARDIAC CATHETERIZATION Left 6/26/2019    Procedure: Cardiac Catheterization/Vascular Study VEL OK  HE WILL WAIT 1 YEAR FOR SHOULDER SURGERY ;  Surgeon: Wade Ramey MD;  Location: [facility] CATH INVASIVE LOCATION;  Service: Cardiology   • CHOLECYSTECTOMY     • CHOLECYSTECTOMY WITH INTRAOPERATIVE CHOLANGIOGRAM N/A 8/1/2018    Procedure: CHOLECYSTECTOMY LAPAROSCOPIC INTRAOPERATIVE CHOLANGIOGRAM;  Surgeon: Shane Ann MD;  Location: [facility] OR;  Service: General   • COLONOSCOPY N/A 7/14/2020    Procedure: COLONOSCOPY WITH ANESTHESIA;  Surgeon: Anupam Morales DO;  Location: [facility] ENDOSCOPY;  Service: Gastroenterology;  Laterality: N/A;  pre: abdominal pain  post: diverticulosis  Vinayak Correia PA   • CORONARY ANGIOPLASTY     • CORONARY ARTERY BYPASS GRAFT N/A 7/6/2019    Procedure: CABG X2 WITH LIMA, LEFT LEG OVH, AND PLACEMENT OF LEFT FEMORAL ARTERIAL LINE;  Surgeon: Steven Tang MD;  Location: [facility] OR;  Service: Cardiothoracic   • CORONARY STENT PLACEMENT      x 6   • ENDOSCOPIC FUNCTIONAL SINUS SURGERY (FESS) Bilateral 12/13/2017    Procedure: PROCEDURE PERFORMED:  Bilateral functional endoscopic anterior ethmoidectomy with bilateral middle  meatal antrostomy Septoplasty Right kathia bullosa resection Bilateral inferior turbinate reduction via Coblation;  Surgeon: Mayank Ibarra MD;  Location: [facility] OR;  Service:    • ENDOSCOPY N/A 2018    Procedure: ESOPHAGOGASTRODUODENOSCOPY WITH ANESTHESIA;  Surgeon: Benitez Mas MD;  Location: [facility] ENDOSCOPY;  Service: Gastroenterology   • ENDOSCOPY N/A 2020    Procedure: ESOPHAGOGASTRODUODENOSCOPY WITH ANESTHESIA;  Surgeon: Anupam Morales DO;  Location: [facility] ENDOSCOPY;  Service: Gastroenterology;  Laterality: N/A;  pre: abdominal pain  post: esophagitis  Vinayak Correia PA   • HERNIA REPAIR      x2 inguinal area   • KIDNEY STONE SURGERY     • KNEE SURGERY Right    • OTHER SURGICAL HISTORY      urolift   • PROSTATE SURGERY      Dr. Badillo -    • ROTATOR CUFF REPAIR     • THUMB AMPUTATION Left     partial   • TOE AMPUTATION Right     big     Family History   Problem Relation Age of Onset   • Heart disease Father    • COPD Mother    • Hypertension Mother    • Asthma Mother    • No Known Problems Sister    • Colon cancer Paternal Uncle    • Prostate cancer Maternal Grandfather    • No Known Problems Sister    • Colon cancer Maternal Grandmother    • Colon polyps Maternal Grandmother      Social History     Tobacco Use   • Smoking status: Former Smoker     Packs/day: 1.50     Years: 4.50     Pack years: 6.75     Types: Cigarettes, Cigars     Quit date:      Years since quittin.8   • Smokeless tobacco: Former User     Types: Chew     Quit date:    Substance Use Topics   • Alcohol use: No   • Drug use: No       Medications:  Current Outpatient Medications   Medication Sig Dispense Refill   • albuterol (PROVENTIL HFA;VENTOLIN HFA) 108 (90 BASE) MCG/ACT inhaler Inhale 2 puffs Every 6 (Six) Hours As Needed for wheezing.     • apixaban (ELIQUIS) 5 MG tablet tablet Take 1 tablet by mouth Every 12 (Twelve) Hours. 180 tablet 3   • aspirin 81 MG chewable tablet Chew 81 mg Daily.      • calcium polycarbophil (FIBERCON) 625 MG tablet Take 625 mg by mouth Daily.     • carboxymethylcellulose (REFRESH PLUS) 0.5 % solution Administer 1 drop to both eyes 4 (Four) Times a Day As Needed for Dry Eyes.     • Empagliflozin (JARDIANCE) 10 MG tablet Take 2 tablets by mouth Daily. 20 mg total     • fluticasone (FLONASE) 50 MCG/ACT nasal spray 2 sprays into each nostril Daily.     • furosemide (LASIX) 40 MG tablet TAKE 1 TABLET BY MOUTH ONCE DAILY AS NEEDED FOR WEIGHT GAIN GREATER THAN 2 LBS IN A DAY OR INCREASING CHEST CONGESTION  0   • insulin aspart (novoLOG FLEXPEN) 100 UNIT/ML solution pen-injector sc pen Inject 30 Units under the skin into the appropriate area as directed Every Morning. 34 units  at breakfast, 30 units at lunch, 38 units hs     • Insulin Glargine (LANTUS SOLOSTAR) 100 UNIT/ML injection pen Inject 60-90 Units under the skin into the appropriate area as directed 2 (Two) Times a Day. 70 units QAM AND 80 units QPM     • isosorbide mononitrate (IMDUR) 30 MG 24 hr tablet Take 1 tablet by mouth Daily. 30 tablet 0   • lamoTRIgine (LaMICtal) 200 MG tablet Take 1 tablet by mouth 2 (Two) Times a Day. (Patient taking differently: Take 100 mg by mouth 2 (Two) Times a Day.) 180 tablet 1   • levETIRAcetam (KEPPRA) 500 MG tablet Take 3 tablets every morning, take 4 tablets every night. (Patient taking differently: Take 1,500 mg by mouth Daily With Breakfast. Patient takes 1500 MG in the morning and 2000 MG at night) 630 tablet 1   • Liraglutide (VICTOZA SC) Inject 1.2 mg under the skin into the appropriate area as directed Every Night. 1.2     • lisinopril (PRINIVIL,ZESTRIL) 20 MG tablet Take 1 tablet by mouth Daily. 90 tablet 3   • metFORMIN (GLUCOPHAGE) 1000 MG tablet Take 1 tablet by mouth 2 (Two) Times a Day With Meals. HOLD x 48 hours post contrast. May resume 3/18/18     • Multiple Vitamin (MULTI VITAMIN PO) Take 1 tablet by mouth Daily.     • nitroglycerin (NITROSTAT) 0.4 MG SL tablet Place 0.4  mg under the tongue Every 5 (Five) Minutes As Needed for chest pain. Take no more than 3 doses in 15 minutes.     • ondansetron ODT (ZOFRAN-ODT) 4 MG disintegrating tablet Take 1 tablet by mouth Every 4 (Four) Hours As Needed for Nausea or Vomiting. 10 tablet 0   • pantoprazole (PROTONIX) 40 MG EC tablet Take 40 mg by mouth 2 (Two) Times a Day.     • psyllium (METAMUCIL) 58.6 % packet Take 1 packet by mouth Daily As Needed (constipation).       No current facility-administered medications for this visit.        Allergies:    Flagyl [metronidazole], Atorvastatin, and Ciprofloxacin    Review of Systems:   -A 14 point review of systems is completed and is negative.      Objective      Vital Signs  Heart Rate:  [80] 80  BP: (130)/(82) 130/82    Physical Exam:    General Exam:  Head:  Normocephalic, atraumatic.  HEENT: PERRLA.  Full EOM.  Neck:  No lymphadenopathy, thyromegaly or bruit.  Cardiac:  Regular rate and rhythm.  Normal S1, S2.  No murmur, rub or gallop.  Lungs:  Clear to auscultation bilaterally.  No wheeze, rales or rhonchi.  Abdomen:  Non-tender, Non-distended.  Bowel sounds normoactive.  Extremities: Full peripheral pulses.  No clubbing, cyanosis or edema.  Skin: No ulceration, breakdown or rash.      Neurologic Exam:  Mental Status:    -Awake. Alert. Oriented to person, place & time.  -No word finding difficulties.  -No aphasia.  -No dysarthria.  -Follows simple commands.     CN II:  Full visual fields with confrontation.  Pupils equally reactive to light.  CN III, IV, VI:  Extraocular muscles function intact with no nystagmus.  CN V:  Facial sensory is symmetric.  CN VII:  Facial motor symmetric.  CN VIII:  Gross hearing intact bilaterally.  CN IX/X:  Palate elevates symmetrically.  CN XI:  Shoulder shrug symmetric.  CN XII:  Tongue is midline on protrusion.     Motor: (strength out of 5:  1= minimal movement, 2 = movement in plane of gravity, 3 = movement against gravity, 4 = movement against some  resistance, 5 = full strength)     -5/5 in bilateral biceps, triceps, brachioradialis, wrist extensors and intrinsic muscles of the hand.    -5/5 in bilateral hip flexors, quadriceps, hamstrings, gastrocsoleus complex, anterior tibialis and extensor hallucis longus.       Deep Tendon Reflexes:  -Right              Biceps: 2+         Triceps: 2+      Brachioradialis: 2+              Patella: 2+       Ankle: 2+           -Left              Biceps: 2+         Triceps: 2+      Brachioradialis: 2+              Patella: 2+       Ankle: 2+             Tone (Modified Jessica Scale):  No appreciable increase in tone or rigidity noted.     Sensory:  -Intact to light touch, pinprick BUE (C5-T1) and BLE (L2-S1).     Coordination:  -Finger to nose intact BUEs  -Heel to shin intact BLEs  -No ataxia     Gait  -No signs of ataxia  -ambulates unassisted       Results Review:    I reviewed the patient's new clinical results and findings.      No components found for: A1C  Lab Results   Component Value Date    HDL 24 (L) 07/27/2020    LDL 24 07/27/2020     No components found for: B12  Lab Results   Component Value Date    TSH 1.700 10/25/2019       Assessment/Plan     Impression:  1.  Seizure disorder  2.  Obstructive sleep apnea treated with CPAP  3.  Coronary artery disease status post recent three-vessel coronary artery bypass grafting, July 2019  4.  Diabetes mellitus  5.  Hypertension, essential  6.  Dyslipidemia, mixed      Plan:  1.  Continue Keppra 1500 mg in the morning and 2000 mg at night.    2.  Continue Lamictal 100 mg twice daily.    3.  Seizure precautions were discussed to include no tub baths, no swimming, avoiding lack of sleep, and avoiding known triggers. Education given of things that may contribute to a seizure to include, but not limited to: stressful situations, fever, fatigue, lack of sleep, low blood sugar, hyperventilation, flashing lights, and caffeine. Instructions given to take seizure medications as  prescribed. Education given to family member on what to do during a seizure and care following the seizure. Education given to contact this office prior to stopping or changing any medications.    4.  Follow-up in 1 year for annual seizure follow-up.  He will call for any concerns or breakthrough seizure in the interim.    5.  Trial of gabapentin 300 mg capsules nightly.  [facility].  We will ask though, however, for [facility] to manage this further if they are agreeable.  Additionally, he remains seizure-free, he could also begin to follow-up with us on an as-needed basis as long as his PCP is willing to manage medications at this time.          Monty Monreal PA-C  11/16/20  13:36 CST

## 2020-12-03 ENCOUNTER — APPOINTMENT (OUTPATIENT)
Dept: GENERAL RADIOLOGY | Facility: HOSPITAL | Age: 60
End: 2020-12-03

## 2020-12-03 ENCOUNTER — HOSPITAL ENCOUNTER (EMERGENCY)
Facility: HOSPITAL | Age: 60
Discharge: HOME | End: 2020-12-03
Admitting: INTERNAL MEDICINE

## 2020-12-03 VITALS
TEMPERATURE: 98.4 F | DIASTOLIC BLOOD PRESSURE: 81 MMHG | RESPIRATION RATE: 18 BRPM | HEART RATE: 96 BPM | SYSTOLIC BLOOD PRESSURE: 149 MMHG | OXYGEN SATURATION: 95 % | HEIGHT: 72 IN | WEIGHT: 250 LBS | BODY MASS INDEX: 33.86 KG/M2

## 2020-12-03 DIAGNOSIS — L08.9 TOE INFLAMMATION: Primary | ICD-10-CM

## 2020-12-03 LAB
ALBUMIN SERPL-MCNC: 4.4 G/DL (ref 3.5–5.2)
ALBUMIN/GLOB SERPL: 1.5 G/DL
ALP SERPL-CCNC: 114 U/L (ref 39–117)
ALT SERPL W P-5'-P-CCNC: 36 U/L (ref 1–41)
ANION GAP SERPL CALCULATED.3IONS-SCNC: 9 MMOL/L (ref 5–15)
APTT PPP: 28.8 SECONDS (ref 24.1–35)
AST SERPL-CCNC: 26 U/L (ref 1–40)
BASOPHILS # BLD AUTO: 0.04 10*3/MM3 (ref 0–0.2)
BASOPHILS NFR BLD AUTO: 0.7 % (ref 0–1.5)
BILIRUB SERPL-MCNC: 0.6 MG/DL (ref 0–1.2)
BUN SERPL-MCNC: 20 MG/DL (ref 8–23)
BUN/CREAT SERPL: 13.3 (ref 7–25)
CALCIUM SPEC-SCNC: 9.8 MG/DL (ref 8.6–10.5)
CHLORIDE SERPL-SCNC: 106 MMOL/L (ref 98–107)
CO2 SERPL-SCNC: 24 MMOL/L (ref 22–29)
CREAT SERPL-MCNC: 1.5 MG/DL (ref 0.76–1.27)
CRP SERPL-MCNC: 0.16 MG/DL (ref 0–0.5)
D-LACTATE SERPL-SCNC: 1.3 MMOL/L (ref 0.5–2)
DEPRECATED RDW RBC AUTO: 41.1 FL (ref 37–54)
EOSINOPHIL # BLD AUTO: 0.23 10*3/MM3 (ref 0–0.4)
EOSINOPHIL NFR BLD AUTO: 4.2 % (ref 0.3–6.2)
ERYTHROCYTE [DISTWIDTH] IN BLOOD BY AUTOMATED COUNT: 12.9 % (ref 12.3–15.4)
ERYTHROCYTE [SEDIMENTATION RATE] IN BLOOD: 15 MM/HR (ref 0–15)
GFR SERPL CREATININE-BSD FRML MDRD: 47 ML/MIN/1.73
GLOBULIN UR ELPH-MCNC: 2.9 GM/DL
GLUCOSE SERPL-MCNC: 174 MG/DL (ref 65–99)
HCT VFR BLD AUTO: 42.4 % (ref 37.5–51)
HGB BLD-MCNC: 14.4 G/DL (ref 13–17.7)
INR PPP: 0.99 (ref 0.91–1.09)
LYMPHOCYTES # BLD AUTO: 2.21 10*3/MM3 (ref 0.7–3.1)
LYMPHOCYTES NFR BLD AUTO: 40.5 % (ref 19.6–45.3)
MCH RBC QN AUTO: 29.6 PG (ref 26.6–33)
MCHC RBC AUTO-ENTMCNC: 34 G/DL (ref 31.5–35.7)
MCV RBC AUTO: 87.1 FL (ref 79–97)
MONOCYTES # BLD AUTO: 0.46 10*3/MM3 (ref 0.1–0.9)
MONOCYTES NFR BLD AUTO: 8.4 % (ref 5–12)
NEUTROPHILS NFR BLD AUTO: 2.49 10*3/MM3 (ref 1.7–7)
NEUTROPHILS NFR BLD AUTO: 45.7 % (ref 42.7–76)
PLATELET # BLD AUTO: 126 10*3/MM3 (ref 140–450)
PMV BLD AUTO: 11.1 FL (ref 6–12)
POTASSIUM SERPL-SCNC: 4.2 MMOL/L (ref 3.5–5.2)
PROT SERPL-MCNC: 7.3 G/DL (ref 6–8.5)
PROTHROMBIN TIME: 12.7 SECONDS (ref 11.9–14.6)
RBC # BLD AUTO: 4.87 10*6/MM3 (ref 4.14–5.8)
SODIUM SERPL-SCNC: 139 MMOL/L (ref 136–145)
WBC # BLD AUTO: 5.46 10*3/MM3 (ref 3.4–10.8)

## 2020-12-03 PROCEDURE — 85610 PROTHROMBIN TIME: CPT | Performed by: NURSE PRACTITIONER

## 2020-12-03 PROCEDURE — 99283 EMERGENCY DEPT VISIT LOW MDM: CPT

## 2020-12-03 PROCEDURE — 85730 THROMBOPLASTIN TIME PARTIAL: CPT | Performed by: NURSE PRACTITIONER

## 2020-12-03 PROCEDURE — 80053 COMPREHEN METABOLIC PANEL: CPT | Performed by: NURSE PRACTITIONER

## 2020-12-03 PROCEDURE — 83605 ASSAY OF LACTIC ACID: CPT | Performed by: NURSE PRACTITIONER

## 2020-12-03 PROCEDURE — 85025 COMPLETE CBC W/AUTO DIFF WBC: CPT | Performed by: NURSE PRACTITIONER

## 2020-12-03 PROCEDURE — 86140 C-REACTIVE PROTEIN: CPT | Performed by: NURSE PRACTITIONER

## 2020-12-03 PROCEDURE — 73630 X-RAY EXAM OF FOOT: CPT

## 2020-12-03 PROCEDURE — 87040 BLOOD CULTURE FOR BACTERIA: CPT | Performed by: NURSE PRACTITIONER

## 2020-12-03 PROCEDURE — 85651 RBC SED RATE NONAUTOMATED: CPT | Performed by: NURSE PRACTITIONER

## 2020-12-03 RX ORDER — SODIUM CHLORIDE 0.9 % (FLUSH) 0.9 %
10 SYRINGE (ML) INJECTION AS NEEDED
Status: DISCONTINUED | OUTPATIENT
Start: 2020-12-03 | End: 2020-12-04 | Stop reason: HOSPADM

## 2020-12-04 NOTE — DISCHARGE INSTRUCTIONS
Drink plenty of fluid.  Continue home medication. Monitor blood sugar.  Continue keeping the toe clean and covered.  [facility] - call tomorrow for appointment. Return to ED if condition does not improve or worsens

## 2020-12-04 NOTE — ED NOTES
Removed dressing on the right great toe with TEENA Law. Small ulcer noted to the left posterior side of the right great toe. Scant amount of bloody drainage noted on the dressing. Ulcer had a scab. TEENA Law to place orders. Will continue to monitor.     Roz Nagy RN  12/03/20 [id number]

## 2020-12-04 NOTE — ED PROVIDER NOTES
Subjective   60 yom presents with c/o drainage from a wound to the R great toe. He states he has had intermittent issues with the toe for approximately six years.  He states [facility] is [facility] and [facility].  He states the podiatrist has been 'working on his bunions' and monitoring his feet. He states he was seen there a couple weeks ago and had the toe evaluated.  He states he was given augmentin which he has finished.  There is a small discolored area present to the inner aspect of the distal end of the right great toe.  The area appears bruised.  There are no visible open areas.  There is mild redness. There is no swelling or redness into the toe or foot.  No drainage visualized. He has +PMS of the RLE. The RLE is pink, warm and dry.  He denies fever. He states he is a diabetic.  He states [facility] but has been unable to see them.            Review of Systems   Constitutional: Negative for activity change, appetite change, fatigue and fever.   HENT: Negative for congestion, ear pain, facial swelling and sore throat.    Eyes: Negative for discharge and visual disturbance.   Respiratory: Negative for apnea, chest tightness, shortness of breath, wheezing and stridor.    Cardiovascular: Negative for chest pain and palpitations.   Gastrointestinal: Negative for abdominal distention, abdominal pain, diarrhea, nausea and vomiting.   Genitourinary: Negative for difficulty urinating and dysuria.   Musculoskeletal: Negative for arthralgias and myalgias.   Skin: Negative for rash and wound.   Neurological: Negative for dizziness and seizures.   Psychiatric/Behavioral: Negative for agitation and confusion.       Past Medical History:   Diagnosis Date   • Arthritis    • Asthma    • Cancer (CMS/Formerly [facility])    • Carotid disease, bilateral (CMS/HCC)    • Chest pain    • Chronic ischemic heart disease    • Chronic sinusitis    • Maribell bullosa    • Coronary artery disease    • Deviated septum    • Diabetes  mellitus (CMS/HCC)    • Difficulty urinating    • Diverticulitis    • Enlarged prostate    • Fatty liver    • GERD (gastroesophageal reflux disease)    • Heart disease    • Hyperlipidemia    • Hypertension    • Hypertrophy of nasal turbinates    • Keratoderma    • Kidney stone    • Migraine    • Murmur, heart    • Myocardial infarction (CMS/HCC)    • Obesity    • PONV (postoperative nausea and vomiting)    • Psoriasis    • Seizures (CMS/HCC)    • Sinus congestion    • Skin cancer    • Sleep apnea    • SOB (shortness of breath)    • Stroke (CMS/HCC)    • UTI (urinary tract infection)        Allergies   Allergen Reactions   • Flagyl [Metronidazole] Hives   • Atorvastatin Other (See Comments)     Muscle cramps   • Ciprofloxacin Hives       Past Surgical History:   Procedure Laterality Date   • CARDIAC CATHETERIZATION  01/2016    Dr. Broadbent; widely patent previously placed stents in the left anterior descending and obstructive disease involving the diagonal branch which was treated medically   • CARDIAC CATHETERIZATION N/A 7/14/2017    Procedure: Left Heart Cath;  Surgeon: Wade Ramey MD;  Location:  [facility];  Service:    • CARDIAC CATHETERIZATION Left 10/15/2018    Procedure: Cardiac Catheterization/Vascular Study;  Surgeon: Wade Ramey MD;  Location:  [facility];  Service: Cardiology   • CARDIAC CATHETERIZATION  10/15/2018    Procedure: Functional Flow [place];  Surgeon: Wade Ramey MD;  Location:  [facility];  Service: Cardiology   • CARDIAC CATHETERIZATION N/A 10/15/2018    Procedure: Left ventriculography;  Surgeon: Wade Ramey MD;  Location:  [facility];  Service: Cardiology   • CARDIAC CATHETERIZATION Left 6/26/2019    Procedure: Cardiac Catheterization/Vascular Study VEL OK  HE WILL WAIT 1 YEAR FOR SHOULDER SURGERY ;  Surgeon: Wade Ramey MD;  Location:  [facility];  Service: Cardiology   • CHOLECYSTECTOMY      • CHOLECYSTECTOMY WITH INTRAOPERATIVE CHOLANGIOGRAM N/A 8/1/2018    Procedure: CHOLECYSTECTOMY LAPAROSCOPIC INTRAOPERATIVE CHOLANGIOGRAM;  Surgeon: Shane Ann MD;  Location: [facility] OR;  Service: General   • COLONOSCOPY N/A 7/14/2020    Procedure: COLONOSCOPY WITH ANESTHESIA;  Surgeon: Anupam Morales DO;  Location: [facility] ENDOSCOPY;  Service: Gastroenterology;  Laterality: N/A;  pre: abdominal pain  post: diverticulosis  Vinayak Correia PA   • CORONARY ANGIOPLASTY     • CORONARY ARTERY BYPASS GRAFT N/A 7/6/2019    Procedure: CABG X2 WITH LIMA, LEFT LEG OVH, AND PLACEMENT OF LEFT FEMORAL ARTERIAL LINE;  Surgeon: Setven Tang MD;  Location: [facility] OR;  Service: Cardiothoracic   • CORONARY STENT PLACEMENT      x 6   • ENDOSCOPIC FUNCTIONAL SINUS SURGERY (FESS) Bilateral 12/13/2017    Procedure: PROCEDURE PERFORMED:  Bilateral functional endoscopic anterior ethmoidectomy with bilateral middle meatal antrostomy Septoplasty Right kathia bullosa resection Bilateral inferior turbinate reduction via Coblation;  Surgeon: Mayank Ibarra MD;  Location: [facility] OR;  Service:    • ENDOSCOPY N/A 7/30/2018    Procedure: ESOPHAGOGASTRODUODENOSCOPY WITH ANESTHESIA;  Surgeon: Benitez Mas MD;  Location: [facility] ENDOSCOPY;  Service: Gastroenterology   • ENDOSCOPY N/A 7/14/2020    Procedure: ESOPHAGOGASTRODUODENOSCOPY WITH ANESTHESIA;  Surgeon: Anupam Morales DO;  Location: [facility] ENDOSCOPY;  Service: Gastroenterology;  Laterality: N/A;  pre: abdominal pain  post: esophagitis  Vinayak Correia PA   • HERNIA REPAIR      x2 inguinal area   • KIDNEY STONE SURGERY     • KNEE SURGERY Right    • OTHER SURGICAL HISTORY      urolift   • PROSTATE SURGERY      Dr. Badillo - 2017   • ROTATOR CUFF REPAIR     • THUMB AMPUTATION Left     partial   • TOE AMPUTATION Right     big       Family History   Problem Relation Age of Onset   • Heart disease Father    • COPD Mother    • Hypertension Mother    • Asthma Mother     • No Known Problems Sister    • Colon cancer Paternal Uncle    • Prostate cancer Maternal Grandfather    • No Known Problems Sister    • Colon cancer Maternal Grandmother    • Colon polyps Maternal Grandmother        Social History     Socioeconomic History   • Marital status:      Spouse name: Not on file   • Number of children: Not on file   • Years of education: Not on file   • Highest education level: Not on file   Tobacco Use   • Smoking status: Former Smoker     Packs/day: 1.50     Years: 4.50     Pack years: 6.75     Types: Cigarettes, Cigars     Quit date:      Years since quittin.9   • Smokeless tobacco: Former User     Types: Chew     Quit date:    Substance and Sexual Activity   • Alcohol use: No   • Drug use: No   • Sexual activity: Defer           Objective   Physical Exam  Vitals signs and nursing note reviewed.   Constitutional:       Appearance: He is well-developed.   HENT:      Head: Normocephalic.   Eyes:      Pupils: Pupils are equal, round, and reactive to light.   Neck:      Musculoskeletal: Normal range of motion and neck supple.   Cardiovascular:      Rate and Rhythm: Normal rate and regular rhythm.      Heart sounds: No murmur.   Pulmonary:      Effort: Pulmonary effort is normal.      Breath sounds: Normal breath sounds.   Abdominal:      General: Bowel sounds are normal.      Palpations: Abdomen is soft.   Musculoskeletal: Normal range of motion.      Right foot: Normal range of motion. No tenderness or deformity.        Feet:       Comments: There is a small discolored area present to the inner aspect of the distal end of the right great toe.  The area appears bruised.  There are no visible open areas.  There is mild redness. There is no swelling or redness into the toe or foot.  He has +PMS of the RLE. The RLE is pink, warm and dry.   Skin:     General: Skin is warm and dry.   Neurological:      Mental Status: He is alert and oriented to person, place, and time.          Procedures            Current Facility-Administered Medications:   •  [COMPLETED] Insert peripheral IV, , , Once **AND** sodium chloride 0.9 % flush 10 mL, 10 mL, Intravenous, PRN, Shoulders, Kristee Croft, APRN    Current Outpatient Medications:   •  albuterol (PROVENTIL HFA;VENTOLIN HFA) 108 (90 BASE) MCG/ACT inhaler, Inhale 2 puffs Every 6 (Six) Hours As Needed for wheezing., Disp: , Rfl:   •  apixaban (ELIQUIS) 5 MG tablet tablet, Take 1 tablet by mouth Every 12 (Twelve) Hours., Disp: 180 tablet, Rfl: 3  •  aspirin 81 MG chewable tablet, Chew 81 mg Daily., Disp: , Rfl:   •  calcium polycarbophil (FIBERCON) 625 MG tablet, Take 625 mg by mouth Daily., Disp: , Rfl:   •  carboxymethylcellulose (REFRESH PLUS) 0.5 % solution, Administer 1 drop to both eyes 4 (Four) Times a Day As Needed for Dry Eyes., Disp: , Rfl:   •  Empagliflozin (JARDIANCE) 10 MG tablet, Take 2 tablets by mouth Daily. 20 mg total, Disp: , Rfl:   •  fluticasone (FLONASE) 50 MCG/ACT nasal spray, 2 sprays into each nostril Daily., Disp: , Rfl:   •  furosemide (LASIX) 40 MG tablet, TAKE 1 TABLET BY MOUTH ONCE DAILY AS NEEDED FOR WEIGHT GAIN GREATER THAN 2 LBS IN A DAY OR INCREASING CHEST CONGESTION, Disp: , Rfl: 0  •  gabapentin (Neurontin) 300 MG capsule, Take 1 capsule by mouth Every Night., Disp: 90 capsule, Rfl: 0  •  insulin aspart (novoLOG FLEXPEN) 100 UNIT/ML solution pen-injector sc pen, Inject 30 Units under the skin into the appropriate area as directed Every Morning. 34 units  at breakfast, 30 units at lunch, 38 units hs, Disp: , Rfl:   •  Insulin Glargine (LANTUS SOLOSTAR) 100 UNIT/ML injection pen, Inject 60-90 Units under the skin into the appropriate area as directed 2 (Two) Times a Day. 70 units QAM AND 80 units QPM, Disp: , Rfl:   •  isosorbide mononitrate (IMDUR) 30 MG 24 hr tablet, Take 1 tablet by mouth Daily., Disp: 30 tablet, Rfl: 0  •  lamoTRIgine (LaMICtal) 200 MG tablet, Take 1 tablet by mouth 2 (Two) Times a Day.  "(Patient taking differently: Take 100 mg by mouth 2 (Two) Times a Day.), Disp: 180 tablet, Rfl: 1  •  levETIRAcetam (KEPPRA) 500 MG tablet, Take 3 tablets every morning, take 4 tablets every night. (Patient taking differently: Take 1,500 mg by mouth Daily With Breakfast. Patient takes 1500 MG in the morning and 2000 MG at night), Disp: 630 tablet, Rfl: 1  •  Liraglutide (VICTOZA SC), Inject 1.2 mg under the skin into the appropriate area as directed Every Night. 1.2, Disp: , Rfl:   •  lisinopril (PRINIVIL,ZESTRIL) 20 MG tablet, Take 1 tablet by mouth Daily., Disp: 90 tablet, Rfl: 3  •  metFORMIN (GLUCOPHAGE) 1000 MG tablet, Take 1 tablet by mouth 2 (Two) Times a Day With Meals. HOLD x 48 hours post contrast. May resume 3/18/18, Disp: , Rfl:   •  Multiple Vitamin (MULTI VITAMIN PO), Take 1 tablet by mouth Daily., Disp: , Rfl:   •  nitroglycerin (NITROSTAT) 0.4 MG SL tablet, Place 0.4 mg under the tongue Every 5 (Five) Minutes As Needed for chest pain. Take no more than 3 doses in 15 minutes., Disp: , Rfl:   •  ondansetron ODT (ZOFRAN-ODT) 4 MG disintegrating tablet, Take 1 tablet by mouth Every 4 (Four) Hours As Needed for Nausea or Vomiting., Disp: 10 tablet, Rfl: 0  •  pantoprazole (PROTONIX) 40 MG EC tablet, Take 40 mg by mouth 2 (Two) Times a Day., Disp: , Rfl:   •  psyllium (METAMUCIL) 58.6 % packet, Take 1 packet by mouth Daily As Needed (constipation)., Disp: , Rfl:     Vital signs:  /81 (BP Location: Right arm, Patient Position: Sitting)   Pulse 96   Temp 98.4 °F (36.9 °C) (Oral)   Resp 18   Ht 182.9 cm (72\")   Wt 113 kg (250 lb)   SpO2 95%   BMI 33.91 kg/m²        ED LAB RESULTS:   Lab Results (last 24 hours)     Procedure Component Value Units Date/Time    CBC & Differential [[id number]]  (Abnormal) Collected: 12/03/20 2132    Specimen: Blood Updated: 12/03/20 2146    Narrative:      The following orders were created for panel order CBC & Differential.  Procedure                               " Abnormality         Status                     ---------                               -----------         ------                     CBC Auto Differential[[id number]]        Abnormal            Final result                 Please view results for these tests on the individual orders.    Comprehensive Metabolic Panel [[id number]]  (Abnormal) Collected: 12/03/20 2132    Specimen: Blood Updated: 12/03/20 2200     Glucose 174 mg/dL      BUN 20 mg/dL      Creatinine 1.50 mg/dL      Sodium 139 mmol/L      Potassium 4.2 mmol/L      Chloride 106 mmol/L      CO2 24.0 mmol/L      Calcium 9.8 mg/dL      Total Protein 7.3 g/dL      Albumin 4.40 g/dL      ALT (SGPT) 36 U/L      AST (SGOT) 26 U/L      Alkaline Phosphatase 114 U/L      Total Bilirubin 0.6 mg/dL      eGFR Non African Amer 47 mL/min/1.73      Globulin 2.9 gm/dL      A/G Ratio 1.5 g/dL      BUN/Creatinine Ratio 13.3     Anion Gap 9.0 mmol/L     Narrative:      GFR Normal >60  Chronic Kidney Disease <60  Kidney Failure <15      Protime-INR [[id number]]  (Normal) Collected: 12/03/20 2132    Specimen: Blood Updated: 12/03/20 2149     Protime 12.7 Seconds      INR 0.99    aPTT [[id number]]  (Normal) Collected: 12/03/20 2132    Specimen: Blood Updated: 12/03/20 2149     PTT 28.8 seconds     Lactic Acid, Plasma [[id number]]  (Normal) Collected: 12/03/20 2132    Specimen: Blood Updated: 12/03/20 2156     Lactate 1.3 mmol/L     Sedimentation Rate [[id number]]  (Normal) Collected: 12/03/20 2132    Specimen: Blood Updated: 12/03/20 2159     Sed Rate 15 mm/hr     C-reactive Protein [[id number]]  (Normal) Collected: 12/03/20 2132    Specimen: Blood Updated: 12/03/20 2157     C-Reactive Protein 0.16 mg/dL     Blood Culture With STACY - Blood, Arm, Left [[id number]] Collected: 12/03/20 2132    Specimen: Blood from Arm, Left Updated: 12/03/20 2139    CBC Auto Differential [[id number]]  (Abnormal) Collected: 12/03/20 2132    Specimen: Blood Updated: 12/03/20 2146     WBC 5.46 10*3/mm3       RBC 4.87 10*6/mm3      Hemoglobin 14.4 g/dL      Hematocrit 42.4 %      MCV 87.1 fL      MCH 29.6 pg      MCHC 34.0 g/dL      RDW 12.9 %      RDW-SD 41.1 fl      MPV 11.1 fL      Platelets 126 10*3/mm3      Neutrophil % 45.7 %      Lymphocyte % 40.5 %      Monocyte % 8.4 %      Eosinophil % 4.2 %      Basophil % 0.7 %      Neutrophils, Absolute 2.49 10*3/mm3      Lymphocytes, Absolute 2.21 10*3/mm3      Monocytes, Absolute 0.46 10*3/mm3      Eosinophils, Absolute 0.23 10*3/mm3      Basophils, Absolute 0.04 10*3/mm3              IMAGING RESULTS  XR Foot 3+ View Right   ED Interpretation   See results below      Final Result   1.. No evidence of acute fracture.   2. Arthritic changes of the mid and forefoot. Previous bunionectomy of   the first metatarsal head.   3. Calcaneal spurring.   This report was finalized on 12/03/2020 22:04 by Dr. Dwayne Briceno MD.                       ED Course  ED Course as of Dec 04 0028   Thu Dec 03, 2020   0748 I discussed the patient's history, assessment and testing results with Dr Soto.  His lab work and xray are unremarkable.  He will be dc'd [facility]  I discussed patient's testing results with him. He was very upset stating 'I don't want to wait to see a podiatrist.'  I explained we could not keep him and his lab work and xrays were normal.  We will clean his toe and place a  new dressing.  He was unhappy but voiced understanding to call the [facility] tomorrow for a sooner followup.    [KS]      ED Course User Index  [KS] Shoulders, Kristee Croft, APRN                                           MDM  Number of Diagnoses or Management Options  Toe inflammation: minor     Amount and/or Complexity of Data Reviewed  Clinical lab tests: ordered and reviewed  Tests in the radiology section of CPT®: ordered and reviewed  Decide to obtain previous medical records or to obtain history from someone other than the patient: yes  Discuss the patient with other providers:  yes  Independent visualization of images, tracings, or specimens: yes    Risk of Complications, Morbidity, and/or Mortality  Presenting problems: minimal  Diagnostic procedures: minimal  Management options: minimal    Patient Progress  Patient progress: stable      Final diagnoses:   Toe inflammation            Shoulders, Ani Barrientos, APRN  12/04/20 0028

## 2020-12-08 LAB — BACTERIA SPEC AEROBE CULT: NORMAL

## 2021-01-12 ENCOUNTER — OFFICE VISIT (OUTPATIENT)
Dept: CARDIOLOGY | Facility: CLINIC | Age: 61
End: 2021-01-12

## 2021-01-12 VITALS
BODY MASS INDEX: 34.27 KG/M2 | HEART RATE: 84 BPM | SYSTOLIC BLOOD PRESSURE: 142 MMHG | WEIGHT: 253 LBS | HEIGHT: 72 IN | OXYGEN SATURATION: 99 % | DIASTOLIC BLOOD PRESSURE: 90 MMHG

## 2021-01-12 DIAGNOSIS — Z79.4 TYPE 2 DIABETES MELLITUS WITHOUT COMPLICATION, WITH LONG-TERM CURRENT USE OF INSULIN (HCC): Primary | Chronic | ICD-10-CM

## 2021-01-12 DIAGNOSIS — I69.30 CHRONIC LEFT ARTERIAL ISCHEMIC STROKE, ICA (INTERNAL CAROTID ARTERY): ICD-10-CM

## 2021-01-12 DIAGNOSIS — G47.33 OSA ON CPAP: ICD-10-CM

## 2021-01-12 DIAGNOSIS — I48.92 ATRIAL FLUTTER, UNSPECIFIED TYPE (HCC): ICD-10-CM

## 2021-01-12 DIAGNOSIS — I10 ESSENTIAL HYPERTENSION: ICD-10-CM

## 2021-01-12 DIAGNOSIS — I65.22 STENOSIS OF LEFT CAROTID ARTERY: ICD-10-CM

## 2021-01-12 DIAGNOSIS — K21.9 GASTROESOPHAGEAL REFLUX DISEASE WITHOUT ESOPHAGITIS: Chronic | ICD-10-CM

## 2021-01-12 DIAGNOSIS — Z99.89 OSA ON CPAP: ICD-10-CM

## 2021-01-12 DIAGNOSIS — E11.9 TYPE 2 DIABETES MELLITUS WITHOUT COMPLICATION, WITH LONG-TERM CURRENT USE OF INSULIN (HCC): Primary | Chronic | ICD-10-CM

## 2021-01-12 DIAGNOSIS — I25.119 CORONARY ARTERY DISEASE INVOLVING NATIVE CORONARY ARTERY OF NATIVE HEART WITH ANGINA PECTORIS (HCC): ICD-10-CM

## 2021-01-12 DIAGNOSIS — I48.0 PAROXYSMAL ATRIAL FIBRILLATION (HCC): ICD-10-CM

## 2021-01-12 DIAGNOSIS — R06.02 SHORTNESS OF BREATH: ICD-10-CM

## 2021-01-12 DIAGNOSIS — G40.909 SEIZURE DISORDER (HCC): ICD-10-CM

## 2021-01-12 DIAGNOSIS — E78.2 MIXED HYPERLIPIDEMIA: ICD-10-CM

## 2021-01-12 DIAGNOSIS — I65.23 BILATERAL CAROTID ARTERY STENOSIS: ICD-10-CM

## 2021-01-12 PROCEDURE — 99213 OFFICE O/P EST LOW 20 MIN: CPT | Performed by: INTERNAL MEDICINE

## 2021-01-12 PROCEDURE — 93000 ELECTROCARDIOGRAM COMPLETE: CPT | Performed by: INTERNAL MEDICINE

## 2021-02-12 ENCOUNTER — OFFICE VISIT (OUTPATIENT)
Dept: CARDIAC SURGERY | Facility: CLINIC | Age: 61
End: 2021-02-12

## 2021-02-12 ENCOUNTER — HOSPITAL ENCOUNTER (OUTPATIENT)
Dept: CT IMAGING | Facility: HOSPITAL | Age: 61
Discharge: HOME | End: 2021-02-12
Admitting: THORACIC SURGERY (CARDIOTHORACIC VASCULAR SURGERY)

## 2021-02-12 VITALS
DIASTOLIC BLOOD PRESSURE: 80 MMHG | OXYGEN SATURATION: 95 % | HEIGHT: 72 IN | BODY MASS INDEX: 33.64 KG/M2 | HEART RATE: 79 BPM | SYSTOLIC BLOOD PRESSURE: 124 MMHG | WEIGHT: 248.4 LBS

## 2021-02-12 DIAGNOSIS — I71.20 THORACIC AORTIC ANEURYSM WITHOUT RUPTURE (HCC): Primary | ICD-10-CM

## 2021-02-12 DIAGNOSIS — R91.8 LUNG NODULES: ICD-10-CM

## 2021-02-12 DIAGNOSIS — I71.20 THORACIC AORTIC ANEURYSM WITHOUT RUPTURE (HCC): ICD-10-CM

## 2021-02-12 DIAGNOSIS — D69.1 ASPIRIN-LIKE PLATELET FUNCTION DEFECT (HCC): ICD-10-CM

## 2021-02-12 LAB — CREAT BLDA-MCNC: 0.9 MG/DL (ref 0.6–1.3)

## 2021-02-12 PROCEDURE — 99213 OFFICE O/P EST LOW 20 MIN: CPT | Performed by: THORACIC SURGERY (CARDIOTHORACIC VASCULAR SURGERY)

## 2021-02-12 PROCEDURE — 71260 CT THORAX DX C+: CPT

## 2021-02-12 PROCEDURE — 82565 ASSAY OF CREATININE: CPT

## 2021-02-12 PROCEDURE — 25010000002 IOPAMIDOL 61 % SOLUTION: Performed by: THORACIC SURGERY (CARDIOTHORACIC VASCULAR SURGERY)

## 2021-02-12 RX ADMIN — IOPAMIDOL 100 ML: 612 INJECTION, SOLUTION INTRAVENOUS at 11:18

## 2021-03-01 DIAGNOSIS — G63 POLYNEUROPATHY ASSOCIATED WITH UNDERLYING DISEASE (HCC): ICD-10-CM

## 2021-03-01 RX ORDER — GABAPENTIN 300 MG/1
300 CAPSULE ORAL NIGHTLY
Qty: 90 CAPSULE | Refills: 0 | Status: SHIPPED | OUTPATIENT
Start: 2021-03-01 | End: 2021-07-06 | Stop reason: SDUPTHER

## 2021-03-01 NOTE — TELEPHONE ENCOUNTER
DELETE AFTER REVIEWING: Telephone encounter to be sent to the clinical pool.  If patient has less than a 3 day supply left, send the encounter HIGH Priority.    Caller: SHANNAN GOLDSTEIN    Relationship: SELF    Best call back number: [phone]    Medication needed:   Requested Prescriptions     Pending Prescriptions Disp Refills   • gabapentin (Neurontin) 300 MG capsule 90 capsule 0     Sig: Take 1 capsule by mouth Every Night.       When do you need the refill by: EARLIEST CONVENIENCE    What details did the patient provide when requesting the medication: PT STILL TAKING MEDICATION AS PRESCRIBED, HAS 13 TABLETS LEFT.    Does the patient have less than a 3 day supply:  [] Yes  [x] No    What is the patient's preferred pharmacy: [facility]           DELETE AFTER READING TO PATIENT: “Thank you for sharing this information with me. I will send a message to the clinical team. Please allow 48 hours for the clinical staff to follow up on this request.”

## 2021-03-01 NOTE — PROGRESS NOTES
"Subjective   Chief Complaint   Patient presents with   • Thoracic Aneurysm     Pt is here for follow up w/CT       Patient ID: Carlos A Boyer Jr. is a 60 y.o. male who is here for follow-up having had Urgent CABG- 2V (left internal mammary artery/left anterior descending and reverse saphenous vein graft/first obtuse marginal) with open left greater saphenous vein harvest performed on 7/6/2019    History of Present Illness    He returns for for follow up of lung nodules and thoracic aneurysm.      Non smoker.    He denies unintended weight loss, hoarseness of voice, chest pain, hemoptysis, history of pneumonia, or cough.  No chest pain or shortness of breath.      Since his last office visit he did have shoulder surgery without remark.  It is noted that Dr. Ramey did cardiovert for paroxysmal atrial fibrillation.  He continues with follow-up with Dr. Ramey.    The following portions of the patient's history were reviewed and updated as appropriate: allergies, current medications, past family history, past medical history, past social history, past surgical history and problem list.        Objective   Visit Vitals  /80 (BP Location: Left arm, Patient Position: Sitting, Cuff Size: Adult)   Pulse 79   Ht 182.9 cm (72\")   Wt 113 kg (248 lb 6.4 oz)   SpO2 95%   BMI 33.69 kg/m²       Physical Exam   Constitutional: He is oriented to person, place, and time. He appears well-developed. No distress.   HENT:   Head: Normocephalic and atraumatic.   Eyes: Pupils are equal, round, and reactive to light.   Neck: Normal range of motion. Neck supple.   Cardiovascular: Normal rate, regular rhythm and normal heart sounds. Exam reveals no friction rub.   No murmur heard.  Pulmonary/Chest: Effort normal and breath sounds normal. No respiratory distress. He has no wheezes. He has no rales.   The sternum is well healed.   Abdominal: Soft. He exhibits no distension. There is no abdominal tenderness. There is no guarding. "   Musculoskeletal:      Comments: Saphenectomy incision is c/d/i.     Neurological: He is alert and oriented to person, place, and time. No cranial nerve deficit.   Skin: Skin is warm and dry. No rash noted. He is not diaphoretic. No pallor.   Psychiatric: His behavior is normal.   Vitals reviewed.    Study Result     EXAMINATION: CT ANGIOGRAM CHEST- 2/25/2020 4:04 PM CST     HISTORY: Thoracic aortic aneurysm     COMPARISON: CTA chest 09/07/2019     DOSE: 381 mGy-cm     TECHNIQUE: Sequential imaging was performed from the thoracic inlet  through the upper abdomen following the administration of IV contrast.   Sagittal and coronal reformations were made from the original source  data and reviewed. Additionally, 3-D MIPS reconstructions of the vessels  were made per CTA protocol. Automated exposure control was also utilized  to decrease patient radiation dose.     FINDINGS:   The visualized thyroid gland is grossly normal in appearance. The  trachea and main bronchi appear widely patent and in normal anatomic  position. There is some thickening of the distal esophageal wall, which  may be related to a small hiatal hernia.     No pathologically enlarged axillary, mediastinal, or hilar lymph nodes  are identified. There has been interval decrease in size of mediastinal  and hilar lymph nodes.     The heart appears normal in size. There is no pericardial or pleural  effusion. Coronary artery calcifications are present. Post CABG changes  are noted. The ascending thoracic aorta measures up to 4.0 cm in  diameter. Main pulmonary artery is dilated, suggesting pulmonary  arterial hypertension. No filling defects are identified to suggest PE.     There is minimal atelectasis in the lingula. A tiny nodule is noted in  the lingula measuring 4 mm in size and (axial image 83), new from the  previous exam. There are dependent changes in the lung bases. A tiny  nodule is seen in the peripheral right upper lobe measuring 3-4 mm  in  size (axial image 49), stable. A right lower lobe nodule is seen  measuring 4-5 mm in size (series axial image 61), also stable. A tiny  peripheral nodule is seen in the right upper lobe. Additional tiny  nodules are noted throughout the right lung.     Review of the visualized portion of the upper abdomen demonstrates no  acute abnormalities.     Review of the visualized osseous structures demonstrates no acute or  aggressive lesions. Degenerative spurring is noted in the spine. There  has been prior median sternotomy.     IMPRESSION:  1. Dilation of the ascending thoracic aorta to 4.0 cm.  2. Dilation of the main pulmonary artery suggests pulmonary arterial  hypertension.  3. Atherosclerosis of the aorta and coronary arteries.  4. Tiny bilateral pulmonary nodules are favored to be postinflammatory  in nature. Consider follow-up CT in 12 months to document stability or  resolution.           This report was finalized on 02/25/2020 16:19 by Dr. Ramin Balderas MD.     Study Result    CT CHEST W CONTRAST DIAGNOSTIC- 2/12/2021 10:15 AM CST     HISTORY: lung nodule and aneurysm; I71.2-Thoracic aortic aneurysm,  without rupture; R91.8-Other nonspecific abnormal finding of lung field      COMPARISON: 10/21/2020     DOSE LENGTH PRODUCT: 466 mGy cm. Automated exposure control was also  utilized to decrease patient radiation dose.     TECHNIQUE: Axial images the chest are obtained following IV contrast.  Coronal and sagittal images reformatted.     FINDINGS:  No pathologic intrathoracic or axillary lymphadenopathy.  Prior mediastinal surgery likely coronary bypass. Diffuse coronary  calcifications and likely LAD stent. Heart upper limits of normal in  size. No pericardial or pleural effusions. The ascending thoracic aorta  measures 4.0 cm in maximum dimension descending thoracic aorta measuring  2.9 cm. No dissection.     Images the upper abdomen demonstrate hepatic steatosis. No adrenal  nodules. Cholecystectomy  clips.     Stable 3 mm right upper lobe nodule on series 7 image 66. Stable 4 mm  nodule right middle lobe on image 86. Stable subpleural lateral 5 mm  right lower lobe nodule image 108. Stable 3 mm left upper lobe nodule  image 56. Stable 4 mm left upper lobe nodule image 63. Stable 4 mm  lingular nodule image 85. Dependent atelectasis and or scarring. No  pneumothorax. No endobronchial lesions.     Moderate degenerative change of the thoracic spine with lower thoracic  hemangioma. Rotoscoliotic curvature of the thoracolumbar spine.     IMPRESSION:  1. No acute intrathoracic abnormality identified.  2. Stable ectasia ascending thoracic aorta to maximum measurement of 4  cm. No dissection. Prior coronary bypass surgery with LAD stent.  3. Scattered stable subcentimeter pulmonary nodules with no new  consolidation or nodularity. Dependent basilar atelectasis and/or  scarring.  4. Hepatic steatosis.  This report was finalized on 02/12/2021 12:12 by Dr. Ml Estrada MD.           Assessment/Plan       Diagnoses and all orders for this visit:    1. Thoracic aortic aneurysm without rupture (CMS/HCC) (Primary)  -     CT Angiogram Chest; Future    2. 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CMS/HCC)    3. Lung nodules  -     CT Angiogram Chest; Future         Overall, Carlos A Boyer Jr. is doing well.  He has asymptomatic.  Independent review of his imaging shows multiple bilateral lung nodules that are stable in size.  No concerning changes or characteristics at this time.  His aneurysm measures 4 cm in size in the short axis in greatest dimension and previous measurement is also 4 cm in size.It is fusiform.  We discussed signs /symptoms of acute aortic pathology including signs/symptoms of lung nodules.      Patient's Body mass index is 33.69 kg/m². BMI is above normal parameters. Recommendations include: educational material, referral to primary care and establishing durable lifestyle changes to accomplish an  acceptable weight for age and height.    Carlos A Boyer Jr. is a non-smoker and therefore does not need tobacco cessation education/counseling.      RTC 12 months.

## 2021-04-28 ENCOUNTER — APPOINTMENT (OUTPATIENT)
Dept: GENERAL RADIOLOGY | Facility: HOSPITAL | Age: 61
End: 2021-04-28

## 2021-04-28 ENCOUNTER — APPOINTMENT (OUTPATIENT)
Dept: CT IMAGING | Facility: HOSPITAL | Age: 61
End: 2021-04-28

## 2021-04-28 ENCOUNTER — HOSPITAL ENCOUNTER (OUTPATIENT)
Facility: HOSPITAL | Age: 61
Setting detail: OBSERVATION
Discharge: HOME | End: 2021-04-30
Attending: EMERGENCY MEDICINE | Admitting: INTERNAL MEDICINE

## 2021-04-28 DIAGNOSIS — R07.9 CHEST PAIN, UNSPECIFIED TYPE: ICD-10-CM

## 2021-04-28 DIAGNOSIS — R73.9 HYPERGLYCEMIA: ICD-10-CM

## 2021-04-28 DIAGNOSIS — I25.700 ATHEROSCLEROSIS OF CABG W UNSTABLE ANGINA PECTORIS (HCC): Primary | ICD-10-CM

## 2021-04-28 LAB
ALBUMIN SERPL-MCNC: 4 G/DL (ref 3.5–5.2)
ALBUMIN/GLOB SERPL: 1.4 G/DL
ALP SERPL-CCNC: 133 U/L (ref 39–117)
ALT SERPL W P-5'-P-CCNC: 28 U/L (ref 1–41)
ANION GAP SERPL CALCULATED.3IONS-SCNC: 14 MMOL/L (ref 5–15)
APTT PPP: 28.6 SECONDS (ref 24.1–35)
AST SERPL-CCNC: 18 U/L (ref 1–40)
BASOPHILS # BLD AUTO: 0.03 10*3/MM3 (ref 0–0.2)
BASOPHILS NFR BLD AUTO: 0.3 % (ref 0–1.5)
BILIRUB SERPL-MCNC: 0.5 MG/DL (ref 0–1.2)
BUN SERPL-MCNC: 29 MG/DL (ref 8–23)
BUN/CREAT SERPL: 24 (ref 7–25)
CALCIUM SPEC-SCNC: 9 MG/DL (ref 8.6–10.5)
CHLORIDE SERPL-SCNC: 100 MMOL/L (ref 98–107)
CO2 SERPL-SCNC: 20 MMOL/L (ref 22–29)
CREAT SERPL-MCNC: 1.21 MG/DL (ref 0.76–1.27)
DEPRECATED RDW RBC AUTO: 40.3 FL (ref 37–54)
EOSINOPHIL # BLD AUTO: 0.04 10*3/MM3 (ref 0–0.4)
EOSINOPHIL NFR BLD AUTO: 0.5 % (ref 0.3–6.2)
ERYTHROCYTE [DISTWIDTH] IN BLOOD BY AUTOMATED COUNT: 12.8 % (ref 12.3–15.4)
GFR SERPL CREATININE-BSD FRML MDRD: 61 ML/MIN/1.73
GLOBULIN UR ELPH-MCNC: 2.8 GM/DL
GLUCOSE SERPL-MCNC: 446 MG/DL (ref 65–99)
HCT VFR BLD AUTO: 40.7 % (ref 37.5–51)
HGB BLD-MCNC: 13.9 G/DL (ref 13–17.7)
HOLD SPECIMEN: NORMAL
HOLD SPECIMEN: NORMAL
IMM GRANULOCYTES # BLD AUTO: 0.09 10*3/MM3 (ref 0–0.05)
IMM GRANULOCYTES NFR BLD AUTO: 1 % (ref 0–0.5)
INR PPP: 1.05 (ref 0.91–1.09)
LYMPHOCYTES # BLD AUTO: 2.3 10*3/MM3 (ref 0.7–3.1)
LYMPHOCYTES NFR BLD AUTO: 26.2 % (ref 19.6–45.3)
MCH RBC QN AUTO: 29.7 PG (ref 26.6–33)
MCHC RBC AUTO-ENTMCNC: 34.2 G/DL (ref 31.5–35.7)
MCV RBC AUTO: 87 FL (ref 79–97)
MONOCYTES # BLD AUTO: 0.42 10*3/MM3 (ref 0.1–0.9)
MONOCYTES NFR BLD AUTO: 4.8 % (ref 5–12)
NEUTROPHILS NFR BLD AUTO: 5.91 10*3/MM3 (ref 1.7–7)
NEUTROPHILS NFR BLD AUTO: 67.2 % (ref 42.7–76)
NRBC BLD AUTO-RTO: 0 /100 WBC (ref 0–0.2)
NT-PROBNP SERPL-MCNC: 403.1 PG/ML (ref 0–900)
PLATELET # BLD AUTO: 122 10*3/MM3 (ref 140–450)
PMV BLD AUTO: 11.9 FL (ref 6–12)
POTASSIUM SERPL-SCNC: 4.6 MMOL/L (ref 3.5–5.2)
PROT SERPL-MCNC: 6.8 G/DL (ref 6–8.5)
PROTHROMBIN TIME: 12.9 SECONDS (ref 11.5–13.4)
RBC # BLD AUTO: 4.68 10*6/MM3 (ref 4.14–5.8)
SODIUM SERPL-SCNC: 134 MMOL/L (ref 136–145)
TROPONIN T SERPL-MCNC: <0.01 NG/ML (ref 0–0.03)
WBC # BLD AUTO: 8.79 10*3/MM3 (ref 3.4–10.8)
WHOLE BLOOD HOLD SPECIMEN: NORMAL
WHOLE BLOOD HOLD SPECIMEN: NORMAL

## 2021-04-28 PROCEDURE — 0 IOPAMIDOL PER 1 ML: Performed by: EMERGENCY MEDICINE

## 2021-04-28 PROCEDURE — C9803 HOPD COVID-19 SPEC COLLECT: HCPCS

## 2021-04-28 PROCEDURE — 93010 ELECTROCARDIOGRAM REPORT: CPT | Performed by: INTERNAL MEDICINE

## 2021-04-28 PROCEDURE — 85025 COMPLETE CBC W/AUTO DIFF WBC: CPT | Performed by: EMERGENCY MEDICINE

## 2021-04-28 PROCEDURE — 83735 ASSAY OF MAGNESIUM: CPT | Performed by: INTERNAL MEDICINE

## 2021-04-28 PROCEDURE — 85730 THROMBOPLASTIN TIME PARTIAL: CPT | Performed by: EMERGENCY MEDICINE

## 2021-04-28 PROCEDURE — 99285 EMERGENCY DEPT VISIT HI MDM: CPT

## 2021-04-28 PROCEDURE — 83880 ASSAY OF NATRIURETIC PEPTIDE: CPT | Performed by: EMERGENCY MEDICINE

## 2021-04-28 PROCEDURE — 93005 ELECTROCARDIOGRAM TRACING: CPT | Performed by: EMERGENCY MEDICINE

## 2021-04-28 PROCEDURE — 71045 X-RAY EXAM CHEST 1 VIEW: CPT

## 2021-04-28 PROCEDURE — 80053 COMPREHEN METABOLIC PANEL: CPT | Performed by: EMERGENCY MEDICINE

## 2021-04-28 PROCEDURE — 93005 ELECTROCARDIOGRAM TRACING: CPT

## 2021-04-28 PROCEDURE — 83690 ASSAY OF LIPASE: CPT | Performed by: EMERGENCY MEDICINE

## 2021-04-28 PROCEDURE — 87635 SARS-COV-2 COVID-19 AMP PRB: CPT | Performed by: EMERGENCY MEDICINE

## 2021-04-28 PROCEDURE — 71275 CT ANGIOGRAPHY CHEST: CPT

## 2021-04-28 PROCEDURE — 63710000001 INSULIN REGULAR HUMAN PER 5 UNITS: Performed by: EMERGENCY MEDICINE

## 2021-04-28 PROCEDURE — 85610 PROTHROMBIN TIME: CPT | Performed by: EMERGENCY MEDICINE

## 2021-04-28 PROCEDURE — 83036 HEMOGLOBIN GLYCOSYLATED A1C: CPT | Performed by: INTERNAL MEDICINE

## 2021-04-28 PROCEDURE — 84484 ASSAY OF TROPONIN QUANT: CPT | Performed by: EMERGENCY MEDICINE

## 2021-04-28 RX ORDER — ASPIRIN 81 MG/1
324 TABLET, CHEWABLE ORAL ONCE
Status: COMPLETED | OUTPATIENT
Start: 2021-04-28 | End: 2021-04-28

## 2021-04-28 RX ORDER — NITROGLYCERIN 0.4 MG/1
0.4 TABLET SUBLINGUAL
Status: DISCONTINUED | OUTPATIENT
Start: 2021-04-28 | End: 2021-04-29 | Stop reason: SDUPTHER

## 2021-04-28 RX ORDER — SODIUM CHLORIDE 0.9 % (FLUSH) 0.9 %
10 SYRINGE (ML) INJECTION AS NEEDED
Status: DISCONTINUED | OUTPATIENT
Start: 2021-04-28 | End: 2021-04-29 | Stop reason: SDUPTHER

## 2021-04-28 RX ADMIN — INSULIN HUMAN 12 UNITS: 100 INJECTION, SOLUTION PARENTERAL at 23:41

## 2021-04-28 RX ADMIN — ASPIRIN 324 MG: 81 TABLET, CHEWABLE ORAL at 23:00

## 2021-04-28 RX ADMIN — IOPAMIDOL 100 ML: 755 INJECTION, SOLUTION INTRAVENOUS at 23:48

## 2021-04-28 RX ADMIN — NITROGLYCERIN 0.4 MG: 0.4 TABLET SUBLINGUAL at 23:11

## 2021-04-29 ENCOUNTER — APPOINTMENT (OUTPATIENT)
Dept: CARDIOLOGY | Facility: HOSPITAL | Age: 61
End: 2021-04-29

## 2021-04-29 PROBLEM — I25.700 ATHEROSCLEROSIS OF CABG W UNSTABLE ANGINA PECTORIS: Status: ACTIVE | Noted: 2021-04-28

## 2021-04-29 PROBLEM — R07.9 CHEST PAIN: Status: RESOLVED | Noted: 2020-07-26 | Resolved: 2021-04-29

## 2021-04-29 PROBLEM — I25.700 CORONARY ARTERY DISEASE INVOLVING CORONARY BYPASS GRAFT OF NATIVE HEART WITH UNSTABLE ANGINA PECTORIS: Status: ACTIVE | Noted: 2017-01-17

## 2021-04-29 PROBLEM — Z95.1 S/P CABG (CORONARY ARTERY BYPASS GRAFT): Status: ACTIVE | Noted: 2021-04-29

## 2021-04-29 PROBLEM — Z79.01 CHRONIC ANTICOAGULATION: Status: ACTIVE | Noted: 2021-04-29

## 2021-04-29 LAB
ALBUMIN SERPL-MCNC: 3.9 G/DL (ref 3.5–5.2)
ALBUMIN/GLOB SERPL: 1.4 G/DL
ALP SERPL-CCNC: 116 U/L (ref 39–117)
ALT SERPL W P-5'-P-CCNC: 28 U/L (ref 1–41)
ANION GAP SERPL CALCULATED.3IONS-SCNC: 13 MMOL/L (ref 5–15)
AST SERPL-CCNC: 16 U/L (ref 1–40)
BASOPHILS # BLD AUTO: 0.03 10*3/MM3 (ref 0–0.2)
BASOPHILS NFR BLD AUTO: 0.4 % (ref 0–1.5)
BH CV ECHO MEAS - AI DEC SLOPE: 194 CM/SEC^2
BH CV ECHO MEAS - AI MAX PG: 44.4 MMHG
BH CV ECHO MEAS - AI MAX VEL: 333 CM/SEC
BH CV ECHO MEAS - AI P1/2T: 502.8 MSEC
BH CV ECHO MEAS - AO MAX PG (FULL): 9.3 MMHG
BH CV ECHO MEAS - AO MAX PG: 20.6 MMHG
BH CV ECHO MEAS - AO MEAN PG (FULL): 7 MMHG
BH CV ECHO MEAS - AO MEAN PG: 13 MMHG
BH CV ECHO MEAS - AO ROOT AREA (BSA CORRECTED): 1.5
BH CV ECHO MEAS - AO ROOT AREA: 9.6 CM^2
BH CV ECHO MEAS - AO ROOT DIAM: 3.5 CM
BH CV ECHO MEAS - AO V2 MAX: 227 CM/SEC
BH CV ECHO MEAS - AO V2 MEAN: 172 CM/SEC
BH CV ECHO MEAS - AO V2 VTI: 54.2 CM
BH CV ECHO MEAS - AVA(I,A): 3.1 CM^2
BH CV ECHO MEAS - AVA(I,D): 3.1 CM^2
BH CV ECHO MEAS - AVA(V,A): 3.1 CM^2
BH CV ECHO MEAS - AVA(V,D): 3.1 CM^2
BH CV ECHO MEAS - BSA(HAYCOCK): 2.4 M^2
BH CV ECHO MEAS - BSA: 2.3 M^2
BH CV ECHO MEAS - BZI_BMI: 33.2 KILOGRAMS/M^2
BH CV ECHO MEAS - BZI_METRIC_HEIGHT: 182.9 CM
BH CV ECHO MEAS - BZI_METRIC_WEIGHT: 111.1 KG
BH CV ECHO MEAS - EDV(CUBED): 123.5 ML
BH CV ECHO MEAS - EDV(MOD-SP4): 166 ML
BH CV ECHO MEAS - EDV(TEICH): 117.1 ML
BH CV ECHO MEAS - EF(CUBED): 65.9 %
BH CV ECHO MEAS - EF(MOD-SP4): 66.4 %
BH CV ECHO MEAS - EF(TEICH): 57.2 %
BH CV ECHO MEAS - ESV(CUBED): 42.1 ML
BH CV ECHO MEAS - ESV(MOD-SP4): 55.8 ML
BH CV ECHO MEAS - ESV(TEICH): 50.2 ML
BH CV ECHO MEAS - FS: 30.1 %
BH CV ECHO MEAS - IVS/LVPW: 0.92
BH CV ECHO MEAS - IVSD: 1.4 CM
BH CV ECHO MEAS - LA DIMENSION: 3.9 CM
BH CV ECHO MEAS - LA/AO: 1.1
BH CV ECHO MEAS - LAT PEAK E' VEL: 8.3 CM/SEC
BH CV ECHO MEAS - LV DIASTOLIC VOL/BSA (35-75): 71.5 ML/M^2
BH CV ECHO MEAS - LV MASS(C)D: 291.2 GRAMS
BH CV ECHO MEAS - LV MASS(C)DI: 125.4 GRAMS/M^2
BH CV ECHO MEAS - LV MAX PG: 11.3 MMHG
BH CV ECHO MEAS - LV MEAN PG: 6 MMHG
BH CV ECHO MEAS - LV SYSTOLIC VOL/BSA (12-30): 24 ML/M^2
BH CV ECHO MEAS - LV V1 MAX: 168 CM/SEC
BH CV ECHO MEAS - LV V1 MEAN: 113 CM/SEC
BH CV ECHO MEAS - LV V1 VTI: 40.5 CM
BH CV ECHO MEAS - LVIDD: 5 CM
BH CV ECHO MEAS - LVIDS: 3.5 CM
BH CV ECHO MEAS - LVLD AP4: 9.1 CM
BH CV ECHO MEAS - LVLS AP4: 7.9 CM
BH CV ECHO MEAS - LVOT AREA (M): 4.2 CM^2
BH CV ECHO MEAS - LVOT AREA: 4.2 CM^2
BH CV ECHO MEAS - LVOT DIAM: 2.3 CM
BH CV ECHO MEAS - LVPWD: 1.5 CM
BH CV ECHO MEAS - MED PEAK E' VEL: 5.3 CM/SEC
BH CV ECHO MEAS - MR MAX PG: 140.2 MMHG
BH CV ECHO MEAS - MR MAX VEL: 592 CM/SEC
BH CV ECHO MEAS - MR MEAN PG: 98 MMHG
BH CV ECHO MEAS - MR MEAN VEL: 467 CM/SEC
BH CV ECHO MEAS - MR VTI: 228 CM
BH CV ECHO MEAS - MV A MAX VEL: 68.4 CM/SEC
BH CV ECHO MEAS - MV DEC SLOPE: 457 CM/SEC^2
BH CV ECHO MEAS - MV DEC TIME: 0.29 SEC
BH CV ECHO MEAS - MV E MAX VEL: 132 CM/SEC
BH CV ECHO MEAS - MV E/A: 1.9
BH CV ECHO MEAS - RAP SYSTOLE: 10 MMHG
BH CV ECHO MEAS - RVSP: 35.8 MMHG
BH CV ECHO MEAS - SI(AO): 224.5 ML/M^2
BH CV ECHO MEAS - SI(CUBED): 35 ML/M^2
BH CV ECHO MEAS - SI(LVOT): 72.5 ML/M^2
BH CV ECHO MEAS - SI(MOD-SP4): 47.5 ML/M^2
BH CV ECHO MEAS - SI(TEICH): 28.8 ML/M^2
BH CV ECHO MEAS - SV(AO): 521.5 ML
BH CV ECHO MEAS - SV(CUBED): 81.4 ML
BH CV ECHO MEAS - SV(LVOT): 168.3 ML
BH CV ECHO MEAS - SV(MOD-SP4): 110.2 ML
BH CV ECHO MEAS - SV(TEICH): 67 ML
BH CV ECHO MEAS - TR MAX VEL: 254 CM/SEC
BH CV ECHO MEAS - TV MAX PG: 25.1 MMHG
BH CV ECHO MEAS - TV V2 MAX: 250 CM/SEC
BH CV ECHO MEASUREMENTS AVERAGE E/E' RATIO: 19.41
BILIRUB SERPL-MCNC: 0.5 MG/DL (ref 0–1.2)
BUN SERPL-MCNC: 29 MG/DL (ref 8–23)
BUN/CREAT SERPL: 25.9 (ref 7–25)
CALCIUM SPEC-SCNC: 9 MG/DL (ref 8.6–10.5)
CHLORIDE SERPL-SCNC: 102 MMOL/L (ref 98–107)
CHOLEST SERPL-MCNC: 139 MG/DL (ref 0–200)
CO2 SERPL-SCNC: 21 MMOL/L (ref 22–29)
CREAT SERPL-MCNC: 1.12 MG/DL (ref 0.76–1.27)
DEPRECATED RDW RBC AUTO: 41.1 FL (ref 37–54)
EOSINOPHIL # BLD AUTO: 0.05 10*3/MM3 (ref 0–0.4)
EOSINOPHIL NFR BLD AUTO: 0.6 % (ref 0.3–6.2)
ERYTHROCYTE [DISTWIDTH] IN BLOOD BY AUTOMATED COUNT: 12.8 % (ref 12.3–15.4)
GFR SERPL CREATININE-BSD FRML MDRD: 66 ML/MIN/1.73
GLOBULIN UR ELPH-MCNC: 2.7 GM/DL
GLUCOSE BLDC GLUCOMTR-MCNC: 115 MG/DL (ref 70–130)
GLUCOSE BLDC GLUCOMTR-MCNC: 164 MG/DL (ref 70–130)
GLUCOSE BLDC GLUCOMTR-MCNC: 257 MG/DL (ref 70–130)
GLUCOSE BLDC GLUCOMTR-MCNC: 276 MG/DL (ref 70–130)
GLUCOSE SERPL-MCNC: 304 MG/DL (ref 65–99)
HBA1C MFR BLD: 9.3 % (ref 4.8–5.6)
HCT VFR BLD AUTO: 40 % (ref 37.5–51)
HDLC SERPL-MCNC: 23 MG/DL (ref 40–60)
HGB BLD-MCNC: 13.6 G/DL (ref 13–17.7)
IMM GRANULOCYTES # BLD AUTO: 0.05 10*3/MM3 (ref 0–0.05)
IMM GRANULOCYTES NFR BLD AUTO: 0.6 % (ref 0–0.5)
LDLC SERPL CALC-MCNC: 33 MG/DL (ref 0–100)
LDLC/HDLC SERPL: -0.17 {RATIO}
LEFT ATRIUM VOLUME: 84.1 CM3
LIPASE SERPL-CCNC: 27 U/L (ref 13–60)
LYMPHOCYTES # BLD AUTO: 2.6 10*3/MM3 (ref 0.7–3.1)
LYMPHOCYTES NFR BLD AUTO: 32.7 % (ref 19.6–45.3)
MAGNESIUM SERPL-MCNC: 2.1 MG/DL (ref 1.6–2.4)
MAXIMAL PREDICTED HEART RATE: 157 BPM
MCH RBC QN AUTO: 30 PG (ref 26.6–33)
MCHC RBC AUTO-ENTMCNC: 34 G/DL (ref 31.5–35.7)
MCV RBC AUTO: 88.1 FL (ref 79–97)
MONOCYTES # BLD AUTO: 0.48 10*3/MM3 (ref 0.1–0.9)
MONOCYTES NFR BLD AUTO: 6 % (ref 5–12)
NEUTROPHILS NFR BLD AUTO: 4.74 10*3/MM3 (ref 1.7–7)
NEUTROPHILS NFR BLD AUTO: 59.7 % (ref 42.7–76)
NRBC BLD AUTO-RTO: 0 /100 WBC (ref 0–0.2)
PLATELET # BLD AUTO: 111 10*3/MM3 (ref 140–450)
PMV BLD AUTO: 12.1 FL (ref 6–12)
POTASSIUM SERPL-SCNC: 4.5 MMOL/L (ref 3.5–5.2)
PROT SERPL-MCNC: 6.6 G/DL (ref 6–8.5)
QT INTERVAL: 382 MS
QT INTERVAL: 384 MS
QT INTERVAL: 394 MS
QTC INTERVAL: 428 MS
QTC INTERVAL: 435 MS
QTC INTERVAL: 451 MS
RBC # BLD AUTO: 4.54 10*6/MM3 (ref 4.14–5.8)
SARS-COV-2 RDRP RESP QL NAA+PROBE: NORMAL
SODIUM SERPL-SCNC: 136 MMOL/L (ref 136–145)
STRESS TARGET HR: 133 BPM
TRIGL SERPL-MCNC: 599 MG/DL (ref 0–150)
TROPONIN T SERPL-MCNC: <0.01 NG/ML (ref 0–0.03)
TROPONIN T SERPL-MCNC: <0.01 NG/ML (ref 0–0.03)
VLDLC SERPL-MCNC: 83 MG/DL (ref 5–40)
WBC # BLD AUTO: 7.95 10*3/MM3 (ref 3.4–10.8)
WHOLE BLOOD HOLD SPECIMEN: NORMAL

## 2021-04-29 PROCEDURE — 93010 ELECTROCARDIOGRAM REPORT: CPT | Performed by: INTERNAL MEDICINE

## 2021-04-29 PROCEDURE — 63710000001 INSULIN LISPRO (HUMAN) PER 5 UNITS: Performed by: INTERNAL MEDICINE

## 2021-04-29 PROCEDURE — 84484 ASSAY OF TROPONIN QUANT: CPT | Performed by: EMERGENCY MEDICINE

## 2021-04-29 PROCEDURE — 94799 UNLISTED PULMONARY SVC/PX: CPT

## 2021-04-29 PROCEDURE — 82962 GLUCOSE BLOOD TEST: CPT

## 2021-04-29 PROCEDURE — G0378 HOSPITAL OBSERVATION PER HR: HCPCS

## 2021-04-29 PROCEDURE — 96366 THER/PROPH/DIAG IV INF ADDON: CPT

## 2021-04-29 PROCEDURE — 80061 LIPID PANEL: CPT | Performed by: INTERNAL MEDICINE

## 2021-04-29 PROCEDURE — 25010000002 PERFLUTREN 6.52 MG/ML SUSPENSION: Performed by: INTERNAL MEDICINE

## 2021-04-29 PROCEDURE — 63710000001 INSULIN DETEMIR PER 5 UNITS: Performed by: INTERNAL MEDICINE

## 2021-04-29 PROCEDURE — 85025 COMPLETE CBC W/AUTO DIFF WBC: CPT | Performed by: INTERNAL MEDICINE

## 2021-04-29 PROCEDURE — 96375 TX/PRO/DX INJ NEW DRUG ADDON: CPT

## 2021-04-29 PROCEDURE — 80053 COMPREHEN METABOLIC PANEL: CPT | Performed by: INTERNAL MEDICINE

## 2021-04-29 PROCEDURE — 25010000002 MORPHINE PER 10 MG: Performed by: EMERGENCY MEDICINE

## 2021-04-29 PROCEDURE — 93306 TTE W/DOPPLER COMPLETE: CPT

## 2021-04-29 PROCEDURE — 93005 ELECTROCARDIOGRAM TRACING: CPT | Performed by: INTERNAL MEDICINE

## 2021-04-29 PROCEDURE — 96365 THER/PROPH/DIAG IV INF INIT: CPT

## 2021-04-29 PROCEDURE — 36415 COLL VENOUS BLD VENIPUNCTURE: CPT | Performed by: INTERNAL MEDICINE

## 2021-04-29 PROCEDURE — 93306 TTE W/DOPPLER COMPLETE: CPT | Performed by: INTERNAL MEDICINE

## 2021-04-29 PROCEDURE — 25010000002 ONDANSETRON PER 1 MG: Performed by: EMERGENCY MEDICINE

## 2021-04-29 PROCEDURE — 99214 OFFICE O/P EST MOD 30 MIN: CPT | Performed by: INTERNAL MEDICINE

## 2021-04-29 RX ORDER — ACETAMINOPHEN 650 MG/1
650 SUPPOSITORY RECTAL EVERY 4 HOURS PRN
Status: DISCONTINUED | OUTPATIENT
Start: 2021-04-29 | End: 2021-04-30 | Stop reason: HOSPADM

## 2021-04-29 RX ORDER — GABAPENTIN 300 MG/1
300 CAPSULE ORAL NIGHTLY
Status: DISCONTINUED | OUTPATIENT
Start: 2021-04-29 | End: 2021-04-30 | Stop reason: HOSPADM

## 2021-04-29 RX ORDER — LAMOTRIGINE 100 MG/1
100 TABLET ORAL 2 TIMES DAILY
Status: DISCONTINUED | OUTPATIENT
Start: 2021-04-29 | End: 2021-04-30 | Stop reason: HOSPADM

## 2021-04-29 RX ORDER — LEVETIRACETAM 500 MG/1
2000 TABLET ORAL NIGHTLY
Status: DISCONTINUED | OUTPATIENT
Start: 2021-04-29 | End: 2021-04-30 | Stop reason: HOSPADM

## 2021-04-29 RX ORDER — NICOTINE POLACRILEX 4 MG
15 LOZENGE BUCCAL
Status: DISCONTINUED | OUTPATIENT
Start: 2021-04-29 | End: 2021-04-30 | Stop reason: HOSPADM

## 2021-04-29 RX ORDER — ALBUTEROL SULFATE 2.5 MG/3ML
2.5 SOLUTION RESPIRATORY (INHALATION) EVERY 6 HOURS PRN
Status: DISCONTINUED | OUTPATIENT
Start: 2021-04-29 | End: 2021-04-30 | Stop reason: HOSPADM

## 2021-04-29 RX ORDER — ONDANSETRON 4 MG/1
4 TABLET, ORALLY DISINTEGRATING ORAL EVERY 4 HOURS PRN
Status: DISCONTINUED | OUTPATIENT
Start: 2021-04-29 | End: 2021-04-30 | Stop reason: HOSPADM

## 2021-04-29 RX ORDER — LEVETIRACETAM 500 MG/1
1500 TABLET ORAL
Status: DISCONTINUED | OUTPATIENT
Start: 2021-04-29 | End: 2021-04-30 | Stop reason: HOSPADM

## 2021-04-29 RX ORDER — MULTIPLE VITAMINS W/ MINERALS TAB 9MG-400MCG
1 TAB ORAL DAILY
Status: DISCONTINUED | OUTPATIENT
Start: 2021-04-29 | End: 2021-04-30 | Stop reason: HOSPADM

## 2021-04-29 RX ORDER — MULTIVIT AND MINERALS-FERROUS GLUCONATE 9 MG IRON/15 ML ORAL LIQUID 9 MG/15 ML
15 LIQUID (ML) ORAL DAILY
Status: DISCONTINUED | OUTPATIENT
Start: 2021-04-29 | End: 2021-04-29

## 2021-04-29 RX ORDER — NALOXONE HCL 0.4 MG/ML
0.4 VIAL (ML) INJECTION
Status: DISCONTINUED | OUTPATIENT
Start: 2021-04-29 | End: 2021-04-30 | Stop reason: HOSPADM

## 2021-04-29 RX ORDER — ONDANSETRON 2 MG/ML
4 INJECTION INTRAMUSCULAR; INTRAVENOUS ONCE
Status: COMPLETED | OUTPATIENT
Start: 2021-04-29 | End: 2021-04-29

## 2021-04-29 RX ORDER — PSYLLIUM SEED (WITH DEXTROSE)
2 POWDER (GRAM) ORAL DAILY PRN
Status: DISCONTINUED | OUTPATIENT
Start: 2021-04-29 | End: 2021-04-30 | Stop reason: HOSPADM

## 2021-04-29 RX ORDER — FUROSEMIDE 20 MG/1
20 TABLET ORAL DAILY PRN
Status: DISCONTINUED | OUTPATIENT
Start: 2021-04-29 | End: 2021-04-30 | Stop reason: HOSPADM

## 2021-04-29 RX ORDER — ACETAMINOPHEN 160 MG/5ML
650 SOLUTION ORAL EVERY 4 HOURS PRN
Status: DISCONTINUED | OUTPATIENT
Start: 2021-04-29 | End: 2021-04-30 | Stop reason: HOSPADM

## 2021-04-29 RX ORDER — ONDANSETRON 2 MG/ML
4 INJECTION INTRAMUSCULAR; INTRAVENOUS EVERY 6 HOURS PRN
Status: DISCONTINUED | OUTPATIENT
Start: 2021-04-29 | End: 2021-04-30 | Stop reason: HOSPADM

## 2021-04-29 RX ORDER — NITROGLYCERIN 0.4 MG/1
0.4 TABLET SUBLINGUAL
Status: DISCONTINUED | OUTPATIENT
Start: 2021-04-29 | End: 2021-04-30 | Stop reason: HOSPADM

## 2021-04-29 RX ORDER — FLUTICASONE PROPIONATE 50 MCG
2 SPRAY, SUSPENSION (ML) NASAL DAILY
Status: DISCONTINUED | OUTPATIENT
Start: 2021-04-29 | End: 2021-04-30 | Stop reason: HOSPADM

## 2021-04-29 RX ORDER — ROSUVASTATIN CALCIUM 20 MG/1
20 TABLET, COATED ORAL NIGHTLY
Status: DISCONTINUED | OUTPATIENT
Start: 2021-04-29 | End: 2021-04-30 | Stop reason: HOSPADM

## 2021-04-29 RX ORDER — LISINOPRIL 20 MG/1
20 TABLET ORAL DAILY
Status: DISCONTINUED | OUTPATIENT
Start: 2021-04-29 | End: 2021-04-30 | Stop reason: HOSPADM

## 2021-04-29 RX ORDER — SODIUM CHLORIDE 0.9 % (FLUSH) 0.9 %
10 SYRINGE (ML) INJECTION EVERY 12 HOURS SCHEDULED
Status: DISCONTINUED | OUTPATIENT
Start: 2021-04-29 | End: 2021-04-30 | Stop reason: HOSPADM

## 2021-04-29 RX ORDER — ROSUVASTATIN CALCIUM 20 MG/1
20 TABLET, COATED ORAL NIGHTLY
COMMUNITY

## 2021-04-29 RX ORDER — ASPIRIN 81 MG/1
81 TABLET, CHEWABLE ORAL DAILY
Status: DISCONTINUED | OUTPATIENT
Start: 2021-04-29 | End: 2021-04-30 | Stop reason: HOSPADM

## 2021-04-29 RX ORDER — SODIUM CHLORIDE 9 MG/ML
100 INJECTION, SOLUTION INTRAVENOUS CONTINUOUS
Status: DISCONTINUED | OUTPATIENT
Start: 2021-04-29 | End: 2021-04-30

## 2021-04-29 RX ORDER — SODIUM CHLORIDE 0.9 % (FLUSH) 0.9 %
10 SYRINGE (ML) INJECTION AS NEEDED
Status: DISCONTINUED | OUTPATIENT
Start: 2021-04-29 | End: 2021-04-30 | Stop reason: HOSPADM

## 2021-04-29 RX ORDER — ISOSORBIDE MONONITRATE 30 MG/1
30 TABLET, EXTENDED RELEASE ORAL
Status: DISCONTINUED | OUTPATIENT
Start: 2021-04-29 | End: 2021-04-30 | Stop reason: HOSPADM

## 2021-04-29 RX ORDER — PANTOPRAZOLE SODIUM 40 MG/1
40 TABLET, DELAYED RELEASE ORAL
Status: DISCONTINUED | OUTPATIENT
Start: 2021-04-29 | End: 2021-04-30 | Stop reason: HOSPADM

## 2021-04-29 RX ORDER — DEXTROSE MONOHYDRATE 25 G/50ML
25 INJECTION, SOLUTION INTRAVENOUS
Status: DISCONTINUED | OUTPATIENT
Start: 2021-04-29 | End: 2021-04-30 | Stop reason: HOSPADM

## 2021-04-29 RX ORDER — ONDANSETRON 4 MG/1
4 TABLET, FILM COATED ORAL EVERY 6 HOURS PRN
Status: DISCONTINUED | OUTPATIENT
Start: 2021-04-29 | End: 2021-04-30 | Stop reason: HOSPADM

## 2021-04-29 RX ORDER — NITROGLYCERIN 20 MG/100ML
10-50 INJECTION INTRAVENOUS
Status: DISCONTINUED | OUTPATIENT
Start: 2021-04-29 | End: 2021-04-30

## 2021-04-29 RX ORDER — ACETAMINOPHEN 325 MG/1
650 TABLET ORAL EVERY 4 HOURS PRN
Status: DISCONTINUED | OUTPATIENT
Start: 2021-04-29 | End: 2021-04-30 | Stop reason: HOSPADM

## 2021-04-29 RX ADMIN — INSULIN LISPRO 34 UNITS: 100 INJECTION, SOLUTION INTRAVENOUS; SUBCUTANEOUS at 17:32

## 2021-04-29 RX ADMIN — ROSUVASTATIN CALCIUM 20 MG: 20 TABLET, FILM COATED ORAL at 21:01

## 2021-04-29 RX ADMIN — ASPIRIN 81 MG: 81 TABLET, CHEWABLE ORAL at 08:47

## 2021-04-29 RX ADMIN — SODIUM CHLORIDE, PRESERVATIVE FREE 10 ML: 5 INJECTION INTRAVENOUS at 21:01

## 2021-04-29 RX ADMIN — LAMOTRIGINE 100 MG: 100 TABLET ORAL at 08:48

## 2021-04-29 RX ADMIN — SODIUM CHLORIDE 100 ML/HR: 9 INJECTION, SOLUTION INTRAVENOUS at 13:39

## 2021-04-29 RX ADMIN — LEVETIRACETAM 1500 MG: 500 TABLET, FILM COATED ORAL at 08:48

## 2021-04-29 RX ADMIN — PANTOPRAZOLE SODIUM 40 MG: 40 TABLET, DELAYED RELEASE ORAL at 17:28

## 2021-04-29 RX ADMIN — LISINOPRIL 20 MG: 20 TABLET ORAL at 08:47

## 2021-04-29 RX ADMIN — Medication 1 TABLET: at 10:44

## 2021-04-29 RX ADMIN — INSULIN LISPRO 32 UNITS: 100 INJECTION, SOLUTION INTRAVENOUS; SUBCUTANEOUS at 08:42

## 2021-04-29 RX ADMIN — INSULIN LISPRO 12 UNITS: 100 INJECTION, SOLUTION INTRAVENOUS; SUBCUTANEOUS at 12:11

## 2021-04-29 RX ADMIN — SODIUM CHLORIDE 100 ML/HR: 9 INJECTION, SOLUTION INTRAVENOUS at 03:19

## 2021-04-29 RX ADMIN — PANTOPRAZOLE SODIUM 40 MG: 40 TABLET, DELAYED RELEASE ORAL at 08:46

## 2021-04-29 RX ADMIN — NITROGLYCERIN 10 MCG/MIN: 20 INJECTION INTRAVENOUS at 00:22

## 2021-04-29 RX ADMIN — ONDANSETRON HYDROCHLORIDE 4 MG: 2 SOLUTION INTRAMUSCULAR; INTRAVENOUS at 00:30

## 2021-04-29 RX ADMIN — LAMOTRIGINE 100 MG: 100 TABLET ORAL at 21:01

## 2021-04-29 RX ADMIN — GABAPENTIN 300 MG: 300 CAPSULE ORAL at 21:01

## 2021-04-29 RX ADMIN — MORPHINE SULFATE 4 MG: 4 INJECTION, SOLUTION INTRAMUSCULAR; INTRAVENOUS at 00:22

## 2021-04-29 RX ADMIN — CANAGLIFLOZIN 300 MG: 100 TABLET, FILM COATED ORAL at 08:47

## 2021-04-29 RX ADMIN — PERFLUTREN 8.48 MG: 6.52 INJECTION, SUSPENSION INTRAVENOUS at 16:01

## 2021-04-29 RX ADMIN — INSULIN LISPRO 34 UNITS: 100 INJECTION, SOLUTION INTRAVENOUS; SUBCUTANEOUS at 12:10

## 2021-04-29 RX ADMIN — ISOSORBIDE MONONITRATE 30 MG: 30 TABLET, EXTENDED RELEASE ORAL at 08:47

## 2021-04-29 RX ADMIN — INSULIN DETEMIR 85 UNITS: 100 INJECTION, SOLUTION SUBCUTANEOUS at 08:59

## 2021-04-29 RX ADMIN — LEVETIRACETAM 2000 MG: 500 TABLET, FILM COATED ORAL at 22:11

## 2021-04-30 VITALS
HEART RATE: 64 BPM | TEMPERATURE: 97.4 F | SYSTOLIC BLOOD PRESSURE: 139 MMHG | RESPIRATION RATE: 11 BRPM | OXYGEN SATURATION: 97 % | HEIGHT: 72 IN | BODY MASS INDEX: 33.1 KG/M2 | DIASTOLIC BLOOD PRESSURE: 87 MMHG | WEIGHT: 244.4 LBS

## 2021-04-30 LAB
GLUCOSE BLDC GLUCOMTR-MCNC: 172 MG/DL (ref 70–130)
GLUCOSE BLDC GLUCOMTR-MCNC: 198 MG/DL (ref 70–130)
QT INTERVAL: 402 MS
QTC INTERVAL: 440 MS

## 2021-04-30 PROCEDURE — C1760 CLOSURE DEV, VASC: HCPCS | Performed by: INTERNAL MEDICINE

## 2021-04-30 PROCEDURE — 93459 L HRT ART/GRFT ANGIO: CPT | Performed by: INTERNAL MEDICINE

## 2021-04-30 PROCEDURE — 25010000002 FENTANYL CITRATE (PF) 100 MCG/2ML SOLUTION: Performed by: INTERNAL MEDICINE

## 2021-04-30 PROCEDURE — 25010000002 MIDAZOLAM HCL (PF) 5 MG/5ML SOLUTION: Performed by: INTERNAL MEDICINE

## 2021-04-30 PROCEDURE — 99152 MOD SED SAME PHYS/QHP 5/>YRS: CPT | Performed by: INTERNAL MEDICINE

## 2021-04-30 PROCEDURE — 96366 THER/PROPH/DIAG IV INF ADDON: CPT

## 2021-04-30 PROCEDURE — 63710000001 INSULIN LISPRO (HUMAN) PER 5 UNITS: Performed by: INTERNAL MEDICINE

## 2021-04-30 PROCEDURE — 25010000002 HEPARIN (PORCINE) 2000-0.9 UNIT/L-% SOLUTION: Performed by: INTERNAL MEDICINE

## 2021-04-30 PROCEDURE — 82962 GLUCOSE BLOOD TEST: CPT

## 2021-04-30 PROCEDURE — C1894 INTRO/SHEATH, NON-LASER: HCPCS | Performed by: INTERNAL MEDICINE

## 2021-04-30 PROCEDURE — 25010000002 DIPHENHYDRAMINE PER 50 MG: Performed by: INTERNAL MEDICINE

## 2021-04-30 PROCEDURE — 25010000002 HEPARIN (PORCINE) 1000-0.9 UT/500ML-% SOLUTION: Performed by: INTERNAL MEDICINE

## 2021-04-30 PROCEDURE — 0 IOPAMIDOL PER 1 ML: Performed by: INTERNAL MEDICINE

## 2021-04-30 PROCEDURE — G0378 HOSPITAL OBSERVATION PER HR: HCPCS

## 2021-04-30 RX ORDER — LISINOPRIL 20 MG/1
40 TABLET ORAL DAILY
Qty: 90 TABLET | Refills: 3 | Status: SHIPPED | OUTPATIENT
Start: 2021-04-30 | End: 2022-01-11

## 2021-04-30 RX ORDER — HEPARIN SODIUM 200 [USP'U]/100ML
INJECTION, SOLUTION INTRAVENOUS AS NEEDED
Status: DISCONTINUED | OUTPATIENT
Start: 2021-04-30 | End: 2021-04-30 | Stop reason: HOSPADM

## 2021-04-30 RX ORDER — MIDAZOLAM HYDROCHLORIDE 1 MG/ML
INJECTION, SOLUTION INTRAMUSCULAR; INTRAVENOUS AS NEEDED
Status: DISCONTINUED | OUTPATIENT
Start: 2021-04-30 | End: 2021-04-30 | Stop reason: HOSPADM

## 2021-04-30 RX ORDER — ACETAMINOPHEN 325 MG/1
650 TABLET ORAL EVERY 4 HOURS PRN
Status: DISCONTINUED | OUTPATIENT
Start: 2021-04-30 | End: 2021-04-30 | Stop reason: HOSPADM

## 2021-04-30 RX ORDER — SODIUM CHLORIDE 0.9 % (FLUSH) 0.9 %
10 SYRINGE (ML) INJECTION AS NEEDED
Status: DISCONTINUED | OUTPATIENT
Start: 2021-04-30 | End: 2021-04-30 | Stop reason: HOSPADM

## 2021-04-30 RX ORDER — LIDOCAINE HYDROCHLORIDE 20 MG/ML
INJECTION, SOLUTION INFILTRATION; PERINEURAL AS NEEDED
Status: DISCONTINUED | OUTPATIENT
Start: 2021-04-30 | End: 2021-04-30 | Stop reason: HOSPADM

## 2021-04-30 RX ORDER — SODIUM CHLORIDE 0.9 % (FLUSH) 0.9 %
3 SYRINGE (ML) INJECTION EVERY 12 HOURS SCHEDULED
Status: DISCONTINUED | OUTPATIENT
Start: 2021-04-30 | End: 2021-04-30 | Stop reason: HOSPADM

## 2021-04-30 RX ORDER — FENTANYL CITRATE 50 UG/ML
INJECTION, SOLUTION INTRAMUSCULAR; INTRAVENOUS AS NEEDED
Status: DISCONTINUED | OUTPATIENT
Start: 2021-04-30 | End: 2021-04-30 | Stop reason: HOSPADM

## 2021-04-30 RX ORDER — ISOSORBIDE MONONITRATE 30 MG/1
60 TABLET, EXTENDED RELEASE ORAL
Qty: 30 TABLET | Refills: 0 | Status: SHIPPED | OUTPATIENT
Start: 2021-04-30 | End: 2021-07-12 | Stop reason: SDUPTHER

## 2021-04-30 RX ORDER — SODIUM CHLORIDE 9 MG/ML
1-3 INJECTION, SOLUTION INTRAVENOUS CONTINUOUS
Status: DISCONTINUED | OUTPATIENT
Start: 2021-04-30 | End: 2021-04-30 | Stop reason: HOSPADM

## 2021-04-30 RX ORDER — DIPHENHYDRAMINE HYDROCHLORIDE 50 MG/ML
INJECTION INTRAMUSCULAR; INTRAVENOUS AS NEEDED
Status: DISCONTINUED | OUTPATIENT
Start: 2021-04-30 | End: 2021-04-30 | Stop reason: HOSPADM

## 2021-04-30 RX ORDER — FENOFIBRATE 145 MG/1
145 TABLET, COATED ORAL DAILY
Qty: 30 TABLET | Refills: 2 | Status: ON HOLD | OUTPATIENT
Start: 2021-04-30 | End: 2023-01-24

## 2021-04-30 RX ORDER — SODIUM CHLORIDE 9 MG/ML
100 INJECTION, SOLUTION INTRAVENOUS CONTINUOUS
Status: DISPENSED | OUTPATIENT
Start: 2021-04-30 | End: 2021-04-30

## 2021-04-30 RX ADMIN — SODIUM CHLORIDE 100 ML/HR: 9 INJECTION, SOLUTION INTRAVENOUS at 09:29

## 2021-04-30 RX ADMIN — SODIUM CHLORIDE 100 ML/HR: 9 INJECTION, SOLUTION INTRAVENOUS at 00:19

## 2021-04-30 RX ADMIN — INSULIN LISPRO 34 UNITS: 100 INJECTION, SOLUTION INTRAVENOUS; SUBCUTANEOUS at 12:07

## 2021-04-30 RX ADMIN — LEVETIRACETAM 1500 MG: 500 TABLET, FILM COATED ORAL at 12:07

## 2021-04-30 RX ADMIN — LISINOPRIL 20 MG: 20 TABLET ORAL at 12:07

## 2021-04-30 RX ADMIN — LAMOTRIGINE 100 MG: 100 TABLET ORAL at 12:06

## 2021-04-30 RX ADMIN — SODIUM CHLORIDE 1 ML/KG/HR: 9 INJECTION, SOLUTION INTRAVENOUS at 12:11

## 2021-04-30 RX ADMIN — ASPIRIN 81 MG: 81 TABLET, CHEWABLE ORAL at 06:58

## 2021-05-01 ENCOUNTER — READMISSION MANAGEMENT (OUTPATIENT)
Dept: CALL CENTER | Facility: HOSPITAL | Age: 61
End: 2021-05-01

## 2021-05-01 NOTE — OUTREACH NOTE
Prep Survey      Responses   [facility] facility patient discharged from?  [facility]   Is LACE score < 7 ?  No   Emergency Room discharge w/ pulse ox?  No   Eligibility  Readm Mgmt   Discharge diagnosis  CP,  heart cath,  CAD,  unstable angina   Does the patient have one of the following disease processes/diagnoses(primary or secondary)?  Other   Does the patient have Home health ordered?  No   Is there a DME ordered?  No   Comments regarding appointments  see AVS   Medication alerts for this patient  see AVS   Prep survey completed?  Yes          Germaine Quintana RN

## 2021-05-03 NOTE — PAYOR COMM NOTE
"[place] HOME 4-30-21      Carlos A Boyer Jr. (61 y.o. Male)     Date of Birth Social Security Number Address Home Phone MRN    1960  [facility] [address]  [facility] [address] [phone] [id number]    [employer]          [employer]        Admission Date Admission Type Admitting Provider Attending Provider Department, Room/Bed    4/28/21 Emergency Gurinder Beckman MD  [facility] 4B, 412/1    Discharge Date Discharge Disposition Discharge Destination        4/30/2021 Home or Self Care              Attending Provider: (none)   Allergies: Flagyl [Metronidazole], Atorvastatin, Ciprofloxacin    Isolation: None   Infection: COVID (History) (04/29/21)   Code Status: Prior    Ht: 182.9 cm (72.01\")   Wt: 111 kg (244 lb 6.4 oz)    Admission Cmt: None   Principal Problem: Coronary artery disease involving coronary bypass graft of native heart with unstable angina pectoris (CMS/Formerly [facility]) [I25.700]                 Active Insurance as of 4/28/2021     Primary Coverage     Payor Plan Insurance Group Employer/Plan Group    [facility] OPTUM      Payor Plan Address Payor Plan Phone Number Payor Plan Fax Number Effective Dates    [address] [phone]  6/1/2020 - None Entered    [facility] [id number]       Subscriber Name Subscriber Birth Date Member ID       CARLOS A BOYER JR. 1960 [id number]                 Emergency Contacts      (Rel.) Home Phone Work Phone Mobile Phone    ClaudioDaniela boyd (Spouse) -- -- [phone]               Discharge Summary      Gruinder Beckman MD at 04/30/21 [facility]              [facility] Medicine Services  DISCHARGE SUMMARY       Date of Admission: 4/28/2021  Date of Discharge:  4/30/2021  Primary Care Physician: Vinayak Correia PA    Presenting Problem/History of Present Illness:  Chest pain, unspecified type [R07.9]     Final Discharge Diagnoses:  Active Hospital Problems    Diagnosis    • " **Coronary artery disease involving coronary bypass graft of native heart with unstable angina pectoris (CMS/HCC)    • S/P CABG (coronary artery bypass graft)      WHITLOCK-LAD, VG-OM '19 ([place])     • Chronic anticoagulation    • Atherosclerosis of CABG w unstable angina pectoris (CMS/HCC)      Added automatically from request for surgery [id number]     • Paroxysmal atrial fibrillation (CMS/HCC)    • HOA on CPAP    • Mixed hyperlipidemia    • Type 2 diabetes mellitus without complication, with long-term current use of insulin (CMS/HCC)    • Gastroesophageal reflux disease without esophagitis    • Essential hypertension        Consults: Dr. Llamas with Cardiology    Procedures Performed:   Results for orders placed during the hospital encounter of 04/28/21    Cardiac Catheterization/Vascular Study    [facility] HEART [facility]  Date of procedure: 4/30/2021    Procedures performed:    1.  Selective coronary angiography  2.  Bypass graft angiography  3.  Left heart catheterization  4.  Left ventriculography  5.  Ultrasound-guided vascular access  6.  Supervision of the administration of moderate sedation    Risk, Benefits, and Alternatives discussed with the patient and/or family.  Plan is for moderate sedation, and the patient agrees to proceed with the procedure.  An immediate assessment was done prior to the administration of moderate sedation    Indication: CAD status post two-vessel CABG in 2019, with presentation consistent with unstable angina  Premedication: Versed, Fentanyl  Contrast: Isovue 370 -150 ml to patient  Radiation: Flouro time= 3:44. Air Kerma= 488.98 mGy  Catheters: 6Fr JL4, 6Fr JR4, 6Fr angled pigtail    Procedural details:  The patient was brought to the cath lab and prepped and draped in the usual fashion.  Since the patient is chronically on Eliquis and had his last dose 36 hours ago, we did use ultrasound to facilitate clean access of the right common femoral artery.  Under direct  visualization with ultrasound, a Cook needle was used to puncture the right common femoral artery and a Seldinger technique was used to place a 6 Fr sheath in the right common femoral artery. A 6 Fr JL4 catheter was used to engage the left main coronary artery for left system angiography. That was exchanged for a 6 Fr JR4 catheter which was engaged in the ostium of the right coronary artery for right system angiography.  JR4 was also advanced out over a wire to the left subclavian and used to engage the LIMA for angiography it, and was also used to engage the vein graft arising from the ascending aorta.  Lastly, 6 Swazi angled pigtail was advanced across the valve and used to perform left ventriculography, as well as assess left ventricular pressures and pullback gradient across the valve.  Femoral angiography confirmed the arteriotomy was in a site suitable for closure device deployment.  The catheter and sheath were then removed and arterial hemostasis was obtained using 6 Swazi Angio-Seal. There were no obvious complications and the patient was transferred to the [facility] in hemodynamically stable condition.    I supervised the administration of conscious sedation by nursing staff throughout the case.  First dose was given at 845 and the end of my face-to-face encounter was at 907, accounting for a total of 22 minutes of supervision.  During the case, continuous pulse oximetry, heart rate, blood pressure, and patient status were monitored.    Findings:    Hemodynamics:  Ao= 149/80, mean 108, LV= 158, LVEDP= 20, AV grad= (peak to peak) 9 mmHg    Left ventriculography:  1. The contractility of the left ventricle is normal.  Estimated ejection fraction > 55%.  2. The left ventricle is normal in wall thickness and chamber size.  3. There is no significant mitral regurgitation or aortic insufficiency.    Selective coronary angiography:  Dominance: Right  Left Main coronary artery: Arises normally from the left cusp and  bifurcates.  Normal.  Left anterior descending artery: Moderate size vessel arising normally from the distal left main has a stented segment extending throughout the proximal vessel.  There is mild calcification and diffuse mild disease in the proximal vessel before the stent, then diffuse in-stent restenosis of 30% throughout the stent.  The LAD supplies 1 moderately sized diagonal from the midportion of the stented segment that is free of significant disease.  Distal to the stent, one long septal  is provided and there is 100%  of the mid LAD.  The mid to distal vessel are seen filling via the LIMA, and is a relatively small vessel that tapers near the apex, when it splits into 2 very small recurrent apical branches.  There is diffuse disease throughout this apical segment.    Left circumflex: Moderate to large vessel arising normally from the distal left main that is not dominant for posterior circulation.  Supplies a moderate sized OM1 which itself bifurcates.  The vein graft inserts into the more superior of these 2 sub-branches.  OM1 itself does have moderate 60-70% stenosis at its ostium arising from the AV circumflex, then into subbranches are tortuous but have moderate luminal irregularities.  AV circumflex continues beyond the takeoff of OM1 still is a moderate to large vessel and supplies a moderately sized PL branch that has minor luminal irregularities, and bifurcates to supply the posterior wall.    Right coronary artery: Moderate size vessel arising normally from the right cusp that is dominant for posterior circulation.  There is no dampening with engagement of the catheter, though the proximal portion of the vessel appears small relative to the mid portions.  There is no obstructive disease noted, but mild diffuse disease throughout the vessel.  Does supply a short PDA and 2 small to moderately sized PL branches.    Bypass graft angiography  LIMA to LAD: Ostium, body, and anastomosis  widely patent and free of disease.  SVG to OM1: Ostium, body, and anastomosis widely patent and free of disease.    Ultrasound examination of the right groin: Right common femoral artery was identified anterolaterally to the right common femoral vein using two-dimensional ultrasound.  There was echo-dense thick tissue on the anterior surface of the artery.  This did not appear visually consistent with calcium, but was again noted to be quite dense (and with insertion of both the sheath and Angio-Seal sheath, proved to be quite tough/resistant-most likely scar tissue from multiple prior cardiac catheterizations).  Pulsatile flow was confirmed in the artery using color Doppler, and the vein was fully compressible without any evidence of thrombus.  Needle was seen directly entering the anterior surface of the artery.    Estimated Blood Loss: 5 mL  Specimens: None  Complications: None      Impression:  1.  Native two-vessel coronary artery disease, including chronic total occlusion of the mid-LAD and moderate to severe stenosis of the ostium of OM1.  Both bypass grafts (LIMA to LAD and SVG to OM1) are widely patent.  No changes in native anatomy compared to pre-CABG films from 2019 (with the exception of mid-LAD, which is now chronically and totally occluded, which is expected in the presence of the LIMA, which is now responsible for filling the remainder of the mid to distal LAD).  2.  No culprit stenosis seen for current presentation.  3.  Normal LVEF  4.  Elevated LVEDP.    Plan:  1.  2 hours bedrest  2.  Continue aspirin 81mg daily, and can resume Eliquis with p.m. dose on 5/13.  3.  Increase Imdur to 60 mg daily for improved antianginal benefit  4.  Increase lisinopril dose to 40 mg daily, as some of his symptoms may be attributed to poorly controlled hypertension  5.  Results communicated with primary hospital attending, Dr. Beckman  6.  Follow-up with primary cardiologist, Dr. Ramey, as planned in July, or sooner  with persistent/worsening symptoms    Electronically signed by Sahil Llamas MD, 04/30/21, 9:38 AM CDT.      Pertinent Test Results:   Results for orders placed during the hospital encounter of 04/28/21    Adult Transthoracic Echo Complete W/ Cont if Necessary Per Protocol    Interpretation Summary  · Left ventricular ejection fraction appears to be 56 - 60%. Left ventricular systolic function is normal. There appears to be mild hypokinesis of the apex and distal septal and anterior walls.  · Left ventricular wall thickness is consistent with mild to moderate concentric hypertrophy.  · Left ventricular diastolic function is consistent with (grade II w/high LAP) pseudonormalization.  · Mild aortic valve stenosis is present.  · Normal size and function of the right ventricle.    Imaging Results (Last 7 Days)     Procedure Component Value Units Date/Time    CT Angiogram Chest [[id number]] Collected: 04/29/21 0725     Updated: 04/29/21 0733    Narrative:      CT ANGIOGRAM CHEST- 4/28/2021 11:44 PM CDT      HISTORY: history of anersyum, chest pain      COMPARISON: February 12, 2021.      DOSE LENGTH PRODUCT: 287 mGy cm. Automated exposure control was also  utilized to decrease patient radiation dose.     TECHNIQUE: Helical tomographic images of the chest were obtained after  the administration of intravenous contrast following angiogram protocol.  Additionally, 3D and multiplanar reformatted images were provided.        FINDINGS:    Pulmonary arteries: There is adequate enhancement of the pulmonary  arteries to evaluate for central and segmental pulmonary emboli. There  are no filling defects within the main, lobar, segmental or visualized  subsegmental pulmonary arteries. The arteries are prominent mild  pulmonary arterial hypertension may be present..      Aorta and great vessels: The ascending aorta is 4 cm in diameter. No  evidence of dissection.. The great vessels are normal in appearance.     Visualized neck base:  The imaged portion of the base of the neck appears  unremarkable.      Lungs: The lungs are clear. There is no mass, worrisome nodule, or  consolidation. No pleural effusion is seen. The trachea and bronchial  tree are patent.      Heart: Cardiac silhouette is mildly enlarged. Vascular calcifications  noted in the coronary arteries. The stent in the LAD is present. Wires  are present from previous median sternotomy. There is no pericardial  effusion.      Mediastinum and lymph nodes: No enlarged mediastinal, hilar, or axillary  lymph nodes are present.      Skeletal and soft tissues: The osseous structures of the thorax and  surrounding soft tissues demonstrate no acute process.     Upper abdomen: The imaged portion of the upper abdomen demonstrates no  acute process.        Impression:      1. No evidence of embolic disease  2. Cardiomegaly and coronary artery calcification. With a stent in the  LAD.        This report was finalized on 04/29/2021 07:30 by Dr. Maco Akbar MD.    XR Chest 1 View [[id number]] Collected: 04/29/21 0705     Updated: 04/29/21 0710    Narrative:      EXAM: XR CHEST 1 VW- 4/28/2021 10:53 PM CDT     HISTORY: cp       COMPARISON: August 7, 2020.     TECHNIQUE: Frontal radiograph of the chest     FINDINGS:   The lungs are clear. Cardiac silhouette is mildly enlarged. Wires are  present from previous median sternotomy..      The osseous structures and surrounding soft tissues demonstrate no acute  abnormality.          Impression:      1. Mild cardiomegaly. This may be due to dilated cardiac chambers or  possibly a paracardial effusion..        This report was finalized on 04/29/2021 07:07 by Dr. Maco Akbar MD.        Lab Results (last 48 hours)     Procedure Component Value Units Date/Time    POC Glucose Once [[id number]]  (Abnormal) Collected: 04/30/21 0215    Specimen: Blood Updated: 04/30/21 0227     Glucose 198 mg/dL      Comment: : [id number] Ellie MeganMeter ID: [id number]        POC Glucose Once [[id number]]  (Normal) Collected: 04/29/21 2053    Specimen: Blood Updated: 04/29/21 2120     Glucose 115 mg/dL      Comment: : [id number] Ellie ShepherdnMeter ID: [id number]       POC Glucose Once [[id number]]  (Abnormal) Collected: 04/29/21 1730    Specimen: Blood Updated: 04/29/21 1741     Glucose 164 mg/dL      Comment: : [id number] Abbey MikiaMeter ID: [id number]       POC Glucose Once [[id number]]  (Abnormal) Collected: 04/29/21 1036    Specimen: Blood Updated: 04/29/21 1057     Glucose 257 mg/dL      Comment: : [id number] Abbey MikiaMeter ID: [id number]       Extra Tubes [[id number]] Collected: 04/29/21 0820    Specimen: Blood, Venous Line Updated: 04/29/21 0930    Narrative:      The following orders were created for panel order Extra Tubes.  Procedure                               Abnormality         Status                     ---------                               -----------         ------                     Lavender Top[[id number]]                                     Final result                 Please view results for these tests on the individual orders.    Lavender Top [[id number]] Collected: 04/29/21 0820    Specimen: Blood Updated: 04/29/21 0930     Extra Tube hold for add-on     Comment: Auto resulted       Troponin [[id number]]  (Normal) Collected: 04/29/21 0756    Specimen: Blood Updated: 04/29/21 0847     Troponin T <0.010 ng/mL     Narrative:      Troponin T Reference Range:  <= 0.03 ng/mL-   Negative for AMI  >0.03 ng/mL-     Abnormal for myocardial necrosis.  Clinicians would have to utilize clinical acumen, EKG, Troponin and serial changes to determine if it is an Acute Myocardial Infarction or myocardial injury due to an underlying chronic condition.       Results may be falsely decreased if patient taking Biotin.      Comprehensive Metabolic Panel [[id number]]  (Abnormal) Collected: 04/29/21 0240    Specimen: Blood Updated: 04/29/21 0410     Glucose 304 mg/dL       BUN 29 mg/dL      Creatinine 1.12 mg/dL      Sodium 136 mmol/L      Potassium 4.5 mmol/L      Comment: Slight hemolysis detected by analyzer. Results may be affected.        Chloride 102 mmol/L      CO2 21.0 mmol/L      Calcium 9.0 mg/dL      Total Protein 6.6 g/dL      Albumin 3.90 g/dL      ALT (SGPT) 28 U/L      AST (SGOT) 16 U/L      Alkaline Phosphatase 116 U/L      Total Bilirubin 0.5 mg/dL      eGFR Non African Amer 66 mL/min/1.73      Globulin 2.7 gm/dL      A/G Ratio 1.4 g/dL      BUN/Creatinine Ratio 25.9     Anion Gap 13.0 mmol/L     Narrative:      GFR Normal >60  Chronic Kidney Disease <60  Kidney Failure <15      Lipid Panel [[id number]]  (Abnormal) Collected: 04/29/21 0240    Specimen: Blood Updated: 04/29/21 0408     Total Cholesterol 139 mg/dL      Triglycerides 599 mg/dL      HDL Cholesterol 23 mg/dL      LDL Cholesterol  33 mg/dL      VLDL Cholesterol 83 mg/dL      LDL/HDL Ratio -0.17    Narrative:      Cholesterol Reference Ranges  (U.S. Department of Health and Human Services ATP III Classifications)    Desirable          <200 mg/dL  Borderline High    200-239 mg/dL  High Risk          >240 mg/dL      Triglyceride Reference Ranges  (U.S. Department of Health and Human Services ATP III Classifications)    Normal           <150 mg/dL  Borderline High  150-199 mg/dL  High             200-499 mg/dL  Very High        >500 mg/dL    HDL Reference Ranges  (U.S. Department of Health and Human Services ATP III Classifcations)    Low     <40 mg/dl (major risk factor for CHD)  High    >60 mg/dl ('negative' risk factor for CHD)        LDL Reference Ranges  (U.S. Department of Health and Human Services ATP III Classifcations)    Optimal          <100 mg/dL  Near Optimal     100-129 mg/dL  Borderline High  130-159 mg/dL  High             160-189 mg/dL  Very High        >189 mg/dL    Troponin [[id number]]  (Normal) Collected: 04/29/21 0240    Specimen: Blood Updated: 04/29/21 0404     Troponin T <0.010 ng/mL      Narrative:      Troponin T Reference Range:  <= 0.03 ng/mL-   Negative for AMI  >0.03 ng/mL-     Abnormal for myocardial necrosis.  Clinicians would have to utilize clinical acumen, EKG, Troponin and serial changes to determine if it is an Acute Myocardial Infarction or myocardial injury due to an underlying chronic condition.       Results may be falsely decreased if patient taking Biotin.      CBC Auto Differential [[id number]]  (Abnormal) Collected: 04/29/21 0240    Specimen: Blood Updated: 04/29/21 0343     WBC 7.95 10*3/mm3      RBC 4.54 10*6/mm3      Hemoglobin 13.6 g/dL      Hematocrit 40.0 %      MCV 88.1 fL      MCH 30.0 pg      MCHC 34.0 g/dL      RDW 12.8 %      RDW-SD 41.1 fl      MPV 12.1 fL      Platelets 111 10*3/mm3      Neutrophil % 59.7 %      Lymphocyte % 32.7 %      Monocyte % 6.0 %      Eosinophil % 0.6 %      Basophil % 0.4 %      Immature Grans % 0.6 %      Neutrophils, Absolute 4.74 10*3/mm3      Lymphocytes, Absolute 2.60 10*3/mm3      Monocytes, Absolute 0.48 10*3/mm3      Eosinophils, Absolute 0.05 10*3/mm3      Basophils, Absolute 0.03 10*3/mm3      Immature Grans, Absolute 0.05 10*3/mm3      nRBC 0.0 /100 WBC     Hemoglobin A1c [[id number]]  (Abnormal) Collected: 04/28/21 2236    Specimen: Blood Updated: 04/29/21 0249     Hemoglobin A1C 9.30 %     Narrative:      Hemoglobin A1C Ranges:    Increased Risk for Diabetes  5.7% to 6.4%  Diabetes                     >= 6.5%  Diabetic Goal                < 7.0%    Magnesium [[id number]]  (Normal) Collected: 04/28/21 2236    Specimen: Blood Updated: 04/29/21 0242     Magnesium 2.1 mg/dL     POC Glucose Once [[id number]]  (Abnormal) Collected: 04/29/21 0058    Specimen: Blood Updated: 04/29/21 0109     Glucose 276 mg/dL      Comment: : [id number] Derrick AmandaMeter ID: [id number]       Lipase [[id number]]  (Normal) Collected: 04/28/21 2236    Specimen: Blood Updated: 04/29/21 0102     Lipase 27 U/L     COVID-19, ABBOTT IN-HOUSE,NASAL Swab (NO  TRANSPORT MEDIA) 2 HR TAT - Swab, Nasopharynx [[id number]]  (Normal) Collected: 04/28/21 2359    Specimen: Swab from Nasopharynx Updated: 04/29/21 0024     COVID19 Presumptive Negative    Narrative:      Fact sheet for providers: https://www.fda.gov/media/403014/download     Fact sheet for patients: https://www.fda.gov/media/837942/download    Test performed by PCR.  If inconsistent with clinical signs and symptoms patient should be tested with different authorized molecular test.    [place] Draw [[id number]] Collected: 04/28/21 2236    Specimen: Blood Updated: 04/28/21 2345    Narrative:      The following orders were created for panel order [place] Draw.  Procedure                               Abnormality         Status                     ---------                               -----------         ------                     Light Blue Top[[id number]]                                   Final result               Green Top (Gel)[[id number]]                                  Final result               Lavender Top[[id number]]                                     Final result               Red Top[[id number]]                                          Final result                 Please view results for these tests on the individual orders.    Light Blue Top [[id number]] Collected: 04/28/21 2236    Specimen: Blood Updated: 04/28/21 2345     Extra Tube hold for add-on     Comment: Auto resulted       Red Top [[id number]] Collected: 04/28/21 2236    Specimen: Blood Updated: 04/28/21 2345     Extra Tube Hold for add-ons.     Comment: Auto resulted.       Green Top (Gel) [[id number]] Collected: 04/28/21 2236    Specimen: Blood Updated: 04/28/21 2345     Extra Tube Hold for add-ons.     Comment: Auto resulted.       Lavender Top [[id number]] Collected: 04/28/21 2236    Specimen: Blood Updated: 04/28/21 2345     Extra Tube hold for add-on     Comment: Auto resulted       Comprehensive Metabolic Panel [[id number]]  (Abnormal) Collected:  04/28/21 2236    Specimen: Blood Updated: 04/28/21 2319     Glucose 446 mg/dL      BUN 29 mg/dL      Creatinine 1.21 mg/dL      Sodium 134 mmol/L      Potassium 4.6 mmol/L      Comment: Slight hemolysis detected by analyzer. Results may be affected.        Chloride 100 mmol/L      CO2 20.0 mmol/L      Calcium 9.0 mg/dL      Total Protein 6.8 g/dL      Albumin 4.00 g/dL      ALT (SGPT) 28 U/L      AST (SGOT) 18 U/L      Alkaline Phosphatase 133 U/L      Total Bilirubin 0.5 mg/dL      eGFR Non African Amer 61 mL/min/1.73      Globulin 2.8 gm/dL      A/G Ratio 1.4 g/dL      BUN/Creatinine Ratio 24.0     Anion Gap 14.0 mmol/L     Narrative:      GFR Normal >60  Chronic Kidney Disease <60  Kidney Failure <15      Troponin [[id number]]  (Normal) Collected: 04/28/21 2236    Specimen: Blood Updated: 04/28/21 2303     Troponin T <0.010 ng/mL     Narrative:      Troponin T Reference Range:  <= 0.03 ng/mL-   Negative for AMI  >0.03 ng/mL-     Abnormal for myocardial necrosis.  Clinicians would have to utilize clinical acumen, EKG, Troponin and serial changes to determine if it is an Acute Myocardial Infarction or myocardial injury due to an underlying chronic condition.       Results may be falsely decreased if patient taking Biotin.      BNP [[id number]]  (Normal) Collected: 04/28/21 2236    Specimen: Blood Updated: 04/28/21 2302     proBNP 403.1 pg/mL     Narrative:      Among patients with dyspnea, NT-proBNP is highly sensitive for the detection of acute congestive heart failure. In addition NT-proBNP of <300 pg/ml effectively rules out acute congestive heart failure with 99% negative predictive value.    Results may be falsely decreased if patient taking Biotin.      CBC & Differential [[id number]]  (Abnormal) Collected: 04/28/21 2236    Specimen: Blood Updated: 04/28/21 2254    Narrative:      The following orders were created for panel order CBC & Differential.  Procedure                               Abnormality          Status                     ---------                               -----------         ------                     CBC Auto Differential[[id number]]        Abnormal            Final result                 Please view results for these tests on the individual orders.    CBC Auto Differential [[id number]]  (Abnormal) Collected: 04/28/21 2236    Specimen: Blood Updated: 04/28/21 2254     WBC 8.79 10*3/mm3      RBC 4.68 10*6/mm3      Hemoglobin 13.9 g/dL      Hematocrit 40.7 %      MCV 87.0 fL      MCH 29.7 pg      MCHC 34.2 g/dL      RDW 12.8 %      RDW-SD 40.3 fl      MPV 11.9 fL      Platelets 122 10*3/mm3      Neutrophil % 67.2 %      Lymphocyte % 26.2 %      Monocyte % 4.8 %      Eosinophil % 0.5 %      Basophil % 0.3 %      Immature Grans % 1.0 %      Neutrophils, Absolute 5.91 10*3/mm3      Lymphocytes, Absolute 2.30 10*3/mm3      Monocytes, Absolute 0.42 10*3/mm3      Eosinophils, Absolute 0.04 10*3/mm3      Basophils, Absolute 0.03 10*3/mm3      Immature Grans, Absolute 0.09 10*3/mm3      nRBC 0.0 /100 WBC     Protime-INR [[id number]]  (Normal) Collected: 04/28/21 2236    Specimen: Blood Updated: 04/28/21 2253     Protime 12.9 Seconds      INR 1.05    aPTT [[id number]]  (Normal) Collected: 04/28/21 2236    Specimen: Blood Updated: 04/28/21 2253     PTT 28.6 seconds           Chief Complaint on Day of Discharge: Feels better; encouraged by results of left heart catheterization    Hospital Course:  The patient is a 61 y.o. male who presented to [facility] with a chief complaint of chest pain.  Patient has a known history of coronary artery disease, including history of coronary artery bypass grafting in 2019 in addition to prior left heart catheterizations with PCI.  He has a history of atrial flutter, on outpatient anticoagulation with Eliquis.  Patient had abrupt onset of chest pressure, and given his past medical history presented to our facility for further work-up and management.  He did report the  nitroglycerin seemed to alleviate his symptoms, and he was admitted to the hospitalist service on a nitroglycerin drip, and cardiology was consulted given his extensive cardiac history.  Please note that a CT angiogram of the chest was performed which did not reveal any acute findings.  Cardiac enzymes remain negative.  Multiple EKGs were obtained and reviewed.    When his nitroglycerin was turned down patient felt like he was having recurrence of his chest discomfort.  Decision was made to move forward with a left heart catheterization.  This was completed today by Dr. Llamas with the full heart catheterization report as outlined above.  In short, no acute lesions were identified to explain his symptoms.  Patient does admit that his blood pressures have been elevated recently, and our plan is to titrate his dose of lisinopril to 40 mg daily, in addition to increasing his Imdur to 60 mg daily.  We did discuss that he is not currently on a beta-blocker, his heart rates have been hovering in the low 60s today, and given that this was not an event of acute coronary syndrome or NSTEMI we will hold on starting beta-blocker at this time.  Patient will have follow-up with his cardiologist Dr. Ramey moving forward to discuss further.  He will resume his outpatient anticoagulation with Eliquis, in addition to a baby aspirin.  He will also resume his statin, and I will provide him with a prescription for TriCor for hypertriglyceridemia.    Anticipate patient will be eligible for discharge home later this afternoon with plans to follow-up with his primary care provider in 1 week, and then follow-up with Dr. Ramey as scheduled, sooner if needed or patient has recurrence of symptoms.  Patient states that he feels much better, and is encouraged by the results of his left heart catheterization.    Condition on Discharge:  Medically stable    Physical Exam on Discharge:  /87   Pulse 64   Temp 97.4 °F (36.3 °C) (Temporal)    "Resp 11   Ht 182.9 cm (72.01\")   Wt 111 kg (244 lb 6.4 oz)   SpO2 97%   BMI 33.14 kg/m²   Physical Exam  Vitals reviewed.   Constitutional:       Appearance: He is not toxic-appearing.   HENT:      Head: Normocephalic.      Mouth/Throat:      Pharynx: No oropharyngeal exudate.   Eyes:      Pupils: Pupils are equal, round, and reactive to light.   Cardiovascular:      Rate and Rhythm: Normal rate and regular rhythm.   Pulmonary:      Effort: Pulmonary effort is normal. No respiratory distress.      Breath sounds: No wheezing or rales.   Abdominal:      Tenderness: There is no abdominal tenderness.   Neurological:      General: No focal deficit present.      Mental Status: He is alert.       Discharge Disposition:  Home or Self Care    Discharge Medications:     Discharge Medications      New Medications      Instructions Start Date   fenofibrate 145 MG tablet  Commonly known as: Tricor   145 mg, Oral, Daily         Changes to Medications      Instructions Start Date   isosorbide mononitrate 30 MG 24 hr tablet  Commonly known as: IMDUR  What changed: how much to take   60 mg, Oral, Every 24 Hours Scheduled      lamoTRIgine 200 MG tablet  Commonly known as: LaMICtal  What changed: how much to take   200 mg, Oral, 2 Times Daily      levETIRAcetam 500 MG tablet  Commonly known as: KEPPRA  What changed:   · how much to take  · how to take this  · when to take this  · additional instructions   Take 3 tablets every morning, take 4 tablets every night.      lisinopril 20 MG tablet  Commonly known as: PRINIVIL,ZESTRIL  What changed: how much to take   40 mg, Oral, Daily         Continue These Medications      Instructions Start Date   albuterol sulfate  (90 Base) MCG/ACT inhaler  Commonly known as: PROVENTIL HFA;VENTOLIN HFA;PROAIR HFA   2 puffs, Inhalation, Every 6 Hours PRN      apixaban 5 MG tablet tablet  Commonly known as: ELIQUIS   5 mg, Oral, Every 12 Hours Scheduled      aspirin 81 MG chewable tablet   81 " mg, Oral, Daily      carboxymethylcellulose 0.5 % solution  Commonly known as: REFRESH PLUS   1 drop, Both Eyes, 4 Times Daily PRN      fluticasone 50 MCG/ACT nasal spray  Commonly known as: FLONASE   2 sprays, Nasal, Daily      furosemide 40 MG tablet  Commonly known as: LASIX   TAKE 1 TABLET BY MOUTH ONCE DAILY AS NEEDED FOR WEIGHT GAIN GREATER THAN 2 LBS IN A DAY OR INCREASING CHEST CONGESTION      gabapentin 300 MG capsule  Commonly known as: Neurontin   300 mg, Oral, Nightly      insulin aspart 100 UNIT/ML solution pen-injector sc pen  Commonly known as: novoLOG FLEXPEN   30 Units, Subcutaneous, Every Morning, 32 units  at breakfast, 32 units at lunch, 38 units hs      Insulin Glargine 100 UNIT/ML injection pen  Commonly known as: LANTUS SOLOSTAR   60-90 Units, Subcutaneous, 2 Times Daily, 90 units QAM AND 80 units QPM      Jardiance 10 MG tablet  Generic drug: Empagliflozin   2 tablets, Oral, Daily, 20 mg total      metFORMIN 1000 MG tablet  Commonly known as: GLUCOPHAGE   1,000 mg, Oral, 2 Times Daily With Meals, HOLD x 48 hours post contrast. May resume 3/18/18      multivitamin tablet tablet  Commonly known as: THERAGRAN   1 tablet, Oral, Daily      nitroglycerin 0.4 MG SL tablet  Commonly known as: NITROSTAT   0.4 mg, Sublingual, Every 5 Minutes PRN, Take no more than 3 doses in 15 minutes.      ondansetron ODT 4 MG disintegrating tablet  Commonly known as: ZOFRAN-ODT   4 mg, Oral, Every 4 Hours PRN      pantoprazole 40 MG EC tablet  Commonly known as: PROTONIX   40 mg, Oral, 2 Times Daily      polycarbophil 625 MG tablet tablet   625 mg, Oral, Daily      psyllium 58.6 % packet  Commonly known as: METAMUCIL   1 packet, Oral, Daily PRN      rosuvastatin 20 MG tablet  Commonly known as: CRESTOR   20 mg, Oral, Nightly      VICTOZA SC   1.8 mg, Subcutaneous, Nightly, 1.8             Discharge Diet: heart healthy and diabetic diet    Activity at Discharge: slowly resume activity as tolerated        Follow-up  Appointments:   Future Appointments   Date Time Provider Department Center   7/12/2021  9:00 AM Wade Ramey MD [facility] CD  PAD   9/3/2021 10:00 AM PAD US NIVAS CART 2 BH PAD US PAD   9/3/2021 11:00 AM Arabella Yanes APRN [facility] VS PAD PAD   11/18/2021  1:00 PM Monty Monreal PA-C [facility] N PAD PAD   2/9/2022  9:30 AM Steven Tang MD [facility] CTS  PAD PAD     1 week follow-up with primary care provider for posthospitalization assessment regarding the above-mentioned issues.    Test Results Pending at Discharge: none    Electronically signed by Gurinder Beckman MD, 04/30/21, 11:50 CDT.    Time: 25 min        Electronically signed by Gurinder Beckman MD at 04/30/21 1153

## 2021-05-03 NOTE — CASE MANAGEMENT/SOCIAL WORK
Case Management Discharge Note      Final Note: FAXED D/C SUMMARY TO [facility].         Selected Continued Care - Discharged on 4/30/2021 Admission date: 4/28/2021 - Discharge disposition: Home or Self Care    Destination    No services have been selected for the patient.              Durable Medical Equipment    No services have been selected for the patient.              Dialysis/Infusion    No services have been selected for the patient.              Home Medical Care    No services have been selected for the patient.              Therapy    No services have been selected for the patient.              Community Resources    No services have been selected for the patient.                       Final Discharge Disposition Code: 01 - home or self-care

## 2021-05-04 ENCOUNTER — READMISSION MANAGEMENT (OUTPATIENT)
Dept: CALL CENTER | Facility: HOSPITAL | Age: 61
End: 2021-05-04

## 2021-05-04 NOTE — OUTREACH NOTE
Medical Week 1 Survey      Responses   [facility] patient discharged from?  [place]   Does the patient have one of the following disease processes/diagnoses(primary or secondary)?  Other   Week 1 attempt successful?  No   Unsuccessful attempts  Attempt 1          Yumiko Gutiérrez RN

## 2021-05-05 ENCOUNTER — READMISSION MANAGEMENT (OUTPATIENT)
Dept: CALL CENTER | Facility: HOSPITAL | Age: 61
End: 2021-05-05

## 2021-05-05 NOTE — OUTREACH NOTE
Medical Week 1 Survey      Responses   [facility] patient discharged from?  [place]   Does the patient have one of the following disease processes/diagnoses(primary or secondary)?  Other   Week 1 attempt successful?  No   Unsuccessful attempts  Attempt 2          Juliane Bustillo RN

## 2021-05-06 ENCOUNTER — READMISSION MANAGEMENT (OUTPATIENT)
Dept: CALL CENTER | Facility: HOSPITAL | Age: 61
End: 2021-05-06

## 2021-05-06 NOTE — OUTREACH NOTE
Medical Week 1 Survey      Responses   [facility] patient discharged from?  [place]   Does the patient have one of the following disease processes/diagnoses(primary or secondary)?  Other   Week 1 attempt successful?  No   Unsuccessful attempts  Attempt 3          Sulema Mills RN

## 2021-05-12 ENCOUNTER — READMISSION MANAGEMENT (OUTPATIENT)
Dept: CALL CENTER | Facility: HOSPITAL | Age: 61
End: 2021-05-12

## 2021-05-12 NOTE — OUTREACH NOTE
Medical Week 2 Survey      Responses   [facility] patient discharged from?  [place]   Does the patient have one of the following disease processes/diagnoses(primary or secondary)?  Other   Week 2 attempt successful?  Yes   Call start time  1315   Discharge diagnosis  CP,  heart cath,  CAD,  unstable angina   Call end time  1322   Is the patient taking all medications as directed (includes completed medication regime)?  Yes   Does the patient have a primary care provider?   Yes   Does the patient have an appointment with their PCP within 7 days of discharge?  Yes   Has the patient kept scheduled appointments due by today?  Yes   Comments  Has another appt July 13 with PCP   Has home health visited the patient within 72 hours of discharge?  N/A   Psychosocial issues?  No   What is the patient's perception of their health status since discharge?  Improving   Is the patient/caregiver able to teach back signs and symptoms related to disease process for when to call PCP?  Yes   Is the patient/caregiver able to teach back signs and symptoms related to disease process for when to call 911?  Yes   Is the patient/caregiver able to teach back the hierarchy of who to call/visit for symptoms/problems? PCP, Specialist, Home health nurse, Urgent Care, ED, 911  Yes   Additional teach back comments  States still a little tired.  Eating good and drinking fluids.   Week 2 Call Completed?  Yes   Wrap up additional comments  Denies questions or needs at this time          Porsche Adams LPN

## 2021-05-21 ENCOUNTER — READMISSION MANAGEMENT (OUTPATIENT)
Dept: CALL CENTER | Facility: HOSPITAL | Age: 61
End: 2021-05-21

## 2021-05-21 NOTE — OUTREACH NOTE
Medical Week 3 Survey      Responses   [facility] patient discharged from?  [place]   Does the patient have one of the following disease processes/diagnoses(primary or secondary)?  Other   Week 3 attempt successful?  No   Unsuccessful attempts  Attempt 1          Anna Pena RN

## 2021-05-25 PROCEDURE — 80053 COMPREHEN METABOLIC PANEL: CPT | Performed by: NURSE PRACTITIONER

## 2021-05-25 PROCEDURE — 83735 ASSAY OF MAGNESIUM: CPT | Performed by: NURSE PRACTITIONER

## 2021-05-25 PROCEDURE — 85025 COMPLETE CBC W/AUTO DIFF WBC: CPT | Performed by: NURSE PRACTITIONER

## 2021-05-26 ENCOUNTER — READMISSION MANAGEMENT (OUTPATIENT)
Dept: CALL CENTER | Facility: HOSPITAL | Age: 61
End: 2021-05-26

## 2021-05-26 NOTE — OUTREACH NOTE
Medical Week 3 Survey      Responses   [facility] patient discharged from?  [place]   Does the patient have one of the following disease processes/diagnoses(primary or secondary)?  Other   Week 3 attempt successful?  No   Unsuccessful attempts  Attempt 2          Vida Pope RN

## 2021-07-06 DIAGNOSIS — G63 POLYNEUROPATHY ASSOCIATED WITH UNDERLYING DISEASE (HCC): ICD-10-CM

## 2021-07-06 DIAGNOSIS — G40.909 SEIZURE DISORDER (HCC): ICD-10-CM

## 2021-07-06 NOTE — TELEPHONE ENCOUNTER
Caller: SHANNAN GOLDSTEIN    Relationship: SELF    Best call back number: [phone]    Medication needed:   Requested Prescriptions     Pending Prescriptions Disp Refills   • lamoTRIgine (LaMICtal) 200 MG tablet 180 tablet 1     Sig: Take 1 tablet by mouth 2 (Two) Times a Day.   • levETIRAcetam (KEPPRA) 500 MG tablet 630 tablet 1     Sig: Take 3 tablets every morning, take 4 tablets every night.   • gabapentin (Neurontin) 300 MG capsule 90 capsule 0     Sig: Take 1 capsule by mouth Every Night.       When do you need the refill by: ASAP    What additional details did the patient provide when requesting the medication: WILL NEED BY FIRST OF NEXT WEEK (7/12/21)    Does the patient have less than a 3 day supply:  [] Yes  [x] No    What is the patient's preferred pharmacy:      [facility]

## 2021-07-07 RX ORDER — LEVETIRACETAM 500 MG/1
TABLET ORAL
Qty: 630 TABLET | Refills: 1 | Status: SHIPPED | OUTPATIENT
Start: 2021-07-07 | End: 2021-07-26 | Stop reason: SDUPTHER

## 2021-07-07 RX ORDER — LAMOTRIGINE 200 MG/1
200 TABLET ORAL 2 TIMES DAILY
Qty: 180 TABLET | Refills: 1 | Status: SHIPPED | OUTPATIENT
Start: 2021-07-07 | End: 2021-07-26 | Stop reason: SDUPTHER

## 2021-07-07 RX ORDER — GABAPENTIN 300 MG/1
300 CAPSULE ORAL NIGHTLY
Qty: 90 CAPSULE | Refills: 0 | Status: SHIPPED | OUTPATIENT
Start: 2021-07-07 | End: 2021-11-15 | Stop reason: SDUPTHER

## 2021-07-12 ENCOUNTER — OFFICE VISIT (OUTPATIENT)
Dept: CARDIOLOGY | Facility: CLINIC | Age: 61
End: 2021-07-12

## 2021-07-12 ENCOUNTER — DOCUMENTATION (OUTPATIENT)
Dept: CARDIOLOGY | Facility: CLINIC | Age: 61
End: 2021-07-12

## 2021-07-12 VITALS
HEART RATE: 87 BPM | OXYGEN SATURATION: 98 % | BODY MASS INDEX: 33.18 KG/M2 | WEIGHT: 245 LBS | SYSTOLIC BLOOD PRESSURE: 130 MMHG | DIASTOLIC BLOOD PRESSURE: 80 MMHG | HEIGHT: 72 IN

## 2021-07-12 DIAGNOSIS — Z99.89 OSA ON CPAP: ICD-10-CM

## 2021-07-12 DIAGNOSIS — I25.700 CORONARY ARTERY DISEASE INVOLVING CORONARY BYPASS GRAFT OF NATIVE HEART WITH UNSTABLE ANGINA PECTORIS (HCC): ICD-10-CM

## 2021-07-12 DIAGNOSIS — E11.9 TYPE 2 DIABETES MELLITUS WITHOUT COMPLICATION, WITH LONG-TERM CURRENT USE OF INSULIN (HCC): Primary | Chronic | ICD-10-CM

## 2021-07-12 DIAGNOSIS — I48.92 ATRIAL FLUTTER, UNSPECIFIED TYPE (HCC): ICD-10-CM

## 2021-07-12 DIAGNOSIS — Z95.1 S/P CABG (CORONARY ARTERY BYPASS GRAFT): ICD-10-CM

## 2021-07-12 DIAGNOSIS — G47.33 OSA ON CPAP: ICD-10-CM

## 2021-07-12 DIAGNOSIS — Z79.4 TYPE 2 DIABETES MELLITUS WITHOUT COMPLICATION, WITH LONG-TERM CURRENT USE OF INSULIN (HCC): Primary | Chronic | ICD-10-CM

## 2021-07-12 DIAGNOSIS — I25.700 ATHEROSCLEROSIS OF CABG W UNSTABLE ANGINA PECTORIS (HCC): ICD-10-CM

## 2021-07-12 PROCEDURE — 93000 ELECTROCARDIOGRAM COMPLETE: CPT | Performed by: INTERNAL MEDICINE

## 2021-07-12 PROCEDURE — 99214 OFFICE O/P EST MOD 30 MIN: CPT | Performed by: INTERNAL MEDICINE

## 2021-07-12 RX ORDER — ISOSORBIDE MONONITRATE 60 MG/1
60 TABLET, EXTENDED RELEASE ORAL DAILY
Qty: 90 TABLET | Refills: 3 | Status: SHIPPED | OUTPATIENT
Start: 2021-07-12 | End: 2022-01-11 | Stop reason: SDUPTHER

## 2021-07-12 NOTE — PROGRESS NOTES
Carlos A Boyer Jr.  [id number]  1960  61 y.o.  male           Referring Provider: Vinayak Correia PA    Reason for Follow-up Visit:       Here for routine follow up   Prior ER visit for Paroxysmal atrial fibrillation with with rapid ventricular response    CARLOS guided cardioversion, on anticoagulation with Eliquis  coronary artery disease stented coronary artery   Type 2 diabetes mellitus     S/p CABG x 2 Dr Tang 7/2019  S/P  right rotator cuff surgery Dr Divina Michelle [facility]   myocardial perfusion scan as below  Coronary CT angiography report as below    Here for routine follow up today      Subjective    Mild chronic exertional shortness of breath on exertion relieved with rest  No significant cough or wheezing    No palpitations  No associated chest pain  No significant pedal edema    No fever or chills  No significant expectoration    No hemoptysis  No presyncope or syncope    Tolerating current medications well with no untoward side effects   Compliant with prescribed medication regimen. Tries to adhere to cardiac diet.     Overall feels much better   BP well controlled at home.       No bleeding, excessive bruising, gait instability or fall risks    Blood sugars well controlled at home   Per patient intentional weight loss by home scales of 8 lbs      History of present illness:  Carlos A Boyer Jr. is a 61 y.o. yo male with history of chest pain who presents today for   Chief Complaint   Patient presents with   • Coronary Artery Disease     6 MO- LAST EPISODE OF CP WAS 3 MONTHS AGO   • Hypertension   .    History  Past Medical History:   Diagnosis Date   • Arthritis    • Asthma    • Cancer (CMS/HCC)    • Carotid disease, bilateral (CMS/HCC)    • Chest pain    • Chronic ischemic heart disease    • Chronic sinusitis    • Maribell bullosa    • Coronary artery disease    • Deviated septum    • Diabetes mellitus (CMS/HCC)    • Difficulty urinating    • Diverticulitis    • Enlarged prostate    • Fatty liver     • GERD (gastroesophageal reflux disease)    • Heart disease    • Hyperlipidemia    • Hypertension    • Hypertrophy of nasal turbinates    • Keratoderma    • Kidney stone    • Migraine    • Murmur, heart    • Myocardial infarction (CMS/HCC)    • Obesity    • PONV (postoperative nausea and vomiting)    • Psoriasis    • Seizures (CMS/HCC)    • Sinus congestion    • Skin cancer    • Sleep apnea    • SOB (shortness of breath)    • Stroke (CMS/HCC)    • UTI (urinary tract infection)    ,   Past Surgical History:   Procedure Laterality Date   • CARDIAC CATHETERIZATION  01/2016    Dr. Broadbent; widely patent previously placed stents in the left anterior descending and obstructive disease involving the diagonal branch which was treated medically   • CARDIAC CATHETERIZATION N/A 7/14/2017    Procedure: Left Heart Cath;  Surgeon: Wade Ramey MD;  Location:  [facility] CATH INVASIVE LOC[facility];  Service:    • CARDIAC CATHETERIZATION Left 10/15/2018    Procedure: Cardiac Catheterization/Vascular Study;  Surgeon: Wade Ramey MD;  Location:  [facility]ATH [facility];  Service: Cardiology   • CARDIAC CATHETERIZATION  10/15/2018    Procedure: Functional Flow [place];  Surgeon: Wade Ramey MD;  Location:  [facility];  Service: Cardiology   • CARDIAC CATHETERIZATION N/A 10/15/2018    Procedure: Left ventriculography;  Surgeon: Wade Ramey MD;  Location:  [facility];  Service: Cardiology   • CARDIAC CATHETERIZATION Left 6/26/2019    Procedure: Cardiac Catheterization/Vascular Study VEL OK  HE WILL WAIT 1 YEAR FOR SHOULDER SURGERY ;  Surgeon: Wade Ramey MD;  Location:  [facility];  Service: Cardiology   • CARDIAC CATHETERIZATION Left 4/30/2021    Procedure: Coronary angiography;  Surgeon: Sahil Llamas MD;  Location:  [facility] CATH [facility];  Service: Cardiology;  Laterality: Left;   • CARDIAC CATHETERIZATION N/A 4/30/2021    Procedure: Percutaneous Coronary  Intervention;  Surgeon: Sahil Llamas MD;  Location: [facility] CATH INVASIVE LOCATION;  Service: Cardiology;  Laterality: N/A;   • CHOLECYSTECTOMY     • CHOLECYSTECTOMY WITH INTRAOPERATIVE CHOLANGIOGRAM N/A 8/1/2018    Procedure: CHOLECYSTECTOMY LAPAROSCOPIC INTRAOPERATIVE CHOLANGIOGRAM;  Surgeon: Shane Ann MD;  Location: [facility] OR;  Service: General   • COLONOSCOPY N/A 7/14/2020    Procedure: COLONOSCOPY WITH ANESTHESIA;  Surgeon: Anupam Morales DO;  Location: [facility] ENDOSCOPY;  Service: Gastroenterology;  Laterality: N/A;  pre: abdominal pain  post: diverticulosis  Vinayak Correia PA   • CORONARY ANGIOPLASTY     • CORONARY ARTERY BYPASS GRAFT N/A 7/6/2019    Procedure: CABG X2 WITH LIMA, LEFT LEG OVH, AND PLACEMENT OF LEFT FEMORAL ARTERIAL LINE;  Surgeon: Steven Tang MD;  Location: [facility] OR;  Service: Cardiothoracic   • CORONARY STENT PLACEMENT      x 6   • ENDOSCOPIC FUNCTIONAL SINUS SURGERY (FESS) Bilateral 12/13/2017    Procedure: PROCEDURE PERFORMED:  Bilateral functional endoscopic anterior ethmoidectomy with bilateral middle meatal antrostomy Septoplasty Right kathia bullosa resection Bilateral inferior turbinate reduction via Coblation;  Surgeon: Mayank Ibarra MD;  Location: [facility] OR;  Service:    • ENDOSCOPY N/A 7/30/2018    Procedure: ESOPHAGOGASTRODUODENOSCOPY WITH ANESTHESIA;  Surgeon: Benitez Mas MD;  Location: [facility] ENDOSCOPY;  Service: Gastroenterology   • ENDOSCOPY N/A 7/14/2020    Procedure: ESOPHAGOGASTRODUODENOSCOPY WITH ANESTHESIA;  Surgeon: Anupam Morales DO;  Location: [facility] ENDOSCOPY;  Service: Gastroenterology;  Laterality: N/A;  pre: abdominal pain  post: esophagitis  Vinayak Correia PA   • HERNIA REPAIR      x2 inguinal area   • KIDNEY STONE SURGERY     • KNEE SURGERY Right    • OTHER SURGICAL HISTORY      urolift   • PROSTATE SURGERY      Dr. Badillo - 2017   • ROTATOR CUFF REPAIR     • THUMB AMPUTATION Left     partial   • TOE AMPUTATION  Right     big   ,   Family History   Problem Relation Age of Onset   • Heart disease Father    • COPD Mother    • Hypertension Mother    • Asthma Mother    • No Known Problems Sister    • Colon cancer Paternal Uncle    • Prostate cancer Maternal Grandfather    • No Known Problems Sister    • Colon cancer Maternal Grandmother    • Colon polyps Maternal Grandmother    ,   Social History     Tobacco Use   • Smoking status: Former Smoker     Packs/day: 1.50     Years: 4.50     Pack years: 6.75     Types: Cigarettes, Cigars     Quit date:      Years since quittin.5   • Smokeless tobacco: Former User     Types: Chew     Quit date:    Substance Use Topics   • Alcohol use: No   • Drug use: No   ,     Medications  Current Outpatient Medications   Medication Sig Dispense Refill   • albuterol (PROVENTIL HFA;VENTOLIN HFA) 108 (90 BASE) MCG/ACT inhaler Inhale 2 puffs Every 6 (Six) Hours As Needed for wheezing.     • apixaban (ELIQUIS) 5 MG tablet tablet Take 1 tablet by mouth Every 12 (Twelve) Hours. 180 tablet 3   • aspirin 81 MG chewable tablet Chew 81 mg Daily.     • calcium polycarbophil (FIBERCON) 625 MG tablet Take 625 mg by mouth Daily.     • carboxymethylcellulose (REFRESH PLUS) 0.5 % solution Administer 1 drop to both eyes 4 (Four) Times a Day As Needed for Dry Eyes.     • Empagliflozin (JARDIANCE) 10 MG tablet Take 2 tablets by mouth Daily. 20 mg total     • fenofibrate (Tricor) 145 MG tablet Take 1 tablet by mouth Daily. 30 tablet 2   • fluticasone (FLONASE) 50 MCG/ACT nasal spray 2 sprays into each nostril Daily.     • furosemide (LASIX) 40 MG tablet TAKE 1 TABLET BY MOUTH ONCE DAILY AS NEEDED FOR WEIGHT GAIN GREATER THAN 2 LBS IN A DAY OR INCREASING CHEST CONGESTION  0   • gabapentin (Neurontin) 300 MG capsule Take 1 capsule by mouth Every Night. 90 capsule 0   • insulin aspart (novoLOG FLEXPEN) 100 UNIT/ML solution pen-injector sc pen Inject 40 Units under the skin into the appropriate area as directed  Every Morning. 40 units 3 times daily     • insulin detemir (LEVEMIR) 100 UNIT/ML injection Inject 50 Units under the skin into the appropriate area as directed Daily. 100 units twice daily     • isosorbide mononitrate (IMDUR) 60 MG 24 hr tablet Take 1 tablet by mouth Daily. 90 tablet 3   • lamoTRIgine (LaMICtal) 200 MG tablet Take 1 tablet by mouth 2 (Two) Times a Day. 180 tablet 1   • levETIRAcetam (KEPPRA) 500 MG tablet Take 3 tablets every morning, take 4 tablets every night. 630 tablet 1   • Liraglutide (VICTOZA SC) Inject 1.2 mg under the skin into the appropriate area as directed Every Night.     • lisinopril (PRINIVIL,ZESTRIL) 20 MG tablet Take 2 tablets by mouth Daily. 90 tablet 3   • metFORMIN (GLUCOPHAGE) 1000 MG tablet Take 1 tablet by mouth 2 (Two) Times a Day With Meals. HOLD x 48 hours post contrast. May resume 3/18/18     • Multiple Vitamin (MULTI VITAMIN PO) Take 1 tablet by mouth Daily.     • nitroglycerin (NITROSTAT) 0.4 MG SL tablet Place 0.4 mg under the tongue Every 5 (Five) Minutes As Needed for chest pain. Take no more than 3 doses in 15 minutes.     • ondansetron ODT (ZOFRAN-ODT) 4 MG disintegrating tablet Take 1 tablet by mouth Every 4 (Four) Hours As Needed for Nausea or Vomiting. 10 tablet 0   • pantoprazole (PROTONIX) 40 MG EC tablet Take 40 mg by mouth 2 (Two) Times a Day.     • psyllium (METAMUCIL) 58.6 % packet Take 1 packet by mouth Daily As Needed (constipation).     • rosuvastatin (CRESTOR) 20 MG tablet Take 20 mg by mouth Every Night.       No current facility-administered medications for this visit.       Allergies:  Flagyl [metronidazole], Atorvastatin, and Ciprofloxacin    Review of Systems  Review of Systems   Constitutional: Positive for malaise/fatigue.   HENT: Negative.    Eyes: Negative.    Cardiovascular: Positive for dyspnea on exertion. Negative for chest pain, claudication, cyanosis, irregular heartbeat, leg swelling, near-syncope, orthopnea, palpitations, paroxysmal  "nocturnal dyspnea and syncope.   Respiratory: Negative.    Endocrine: Negative.    Hematologic/Lymphatic: Negative.    Skin: Negative.    Musculoskeletal: Positive for arthritis and back pain.   Gastrointestinal: Negative for anorexia.   Genitourinary: Negative.    Neurological: Positive for dizziness, light-headedness and weakness.   Psychiatric/Behavioral: Negative.        Objective     Physical Exam:      Vitals:    07/12/21 0945   BP: 130/80   Pulse: 87   SpO2: 98%   Weight: 111 kg (245 lb)   Height: 182.9 cm (72\")         Physical Exam   Constitutional: He appears well-developed.   HENT:   Head: Normocephalic.   Neck: Normal carotid pulses and no JVD present. No tracheal tenderness present. Carotid bruit is not present. No tracheal deviation present.   Cardiovascular: Regular rhythm, normal heart sounds and normal pulses.   Pulmonary/Chest: Effort normal. No stridor.   Abdominal: Soft. He exhibits no distension. There is no abdominal tenderness.   Neurological: He is alert. No cranial nerve deficit or sensory deficit.   Skin: Skin is warm.   Psychiatric: His speech is normal and behavior is normal.       Results Review:    Cardiac CT angiography 9/16/2020     Impression:      1. Total calcium score : 3345.6  2.  Patent left internal mammary artery graft.  Severe stenosis of the mid left anterior descending coronary artery where there are earlier implanted stents  3.  Suspected more than 50% stenosis of the left anterior descending coronary artery after touchdown site of the left internal mammary artery graft  4.  Severe stenosis of proximal first obtuse marginal branch  5.   Patent saphenous venous graft to the first obtuse marginal branch with suspected more than 50% stenosis at the touchdown site.  Significant coronary calcification preventing good visualization of the anastomotic site.  6.   50 to 60% stenosis of the right posterior descending coronary " artery        ___________________________________________________________________________     Recommendations:     Ongoing risk factor modifications  Further evaluation if ongoing chest pain or high risk of suspicion of significant ischemic heart disease          Carlos A Boyer .   Regadenoson Stress Test With Myocardial Perfusion SPECT (Multi Study)   Order# [id number]   Reading physician: Wade Ramey MD Ordering physician: Wade Ramey MD Study date: 20   Patient Information     Patient Name  Carlos A Boyer Jr. MRN  [id number] Sex  Male  (Age)  1960 (60 y.o.)   Interpretation Summary     · Left ventricular ejection fraction is normal (Calculated EF = 54%).  · Myocardial perfusion imaging indicates a medium-sized infarct located in the anterior wall and apex with no significant ischemia noted.  · Diaphragmatic attenuation and GI artifacts are present.  · Raw images reviewed with the following abnormalities noted: vertical motion.           Conclusion of cardiac catheterization     Left main coronary arteries normal  Left anterior descending coronary artery has patent stents  The distal edge of the stent in the mid left anterior descending coronary artery has approximately 60% stenosis  Highly abnormal fractional flow reserve of this at 0.78 without adenosine stress.  PTCA done of the segment.  Despite multiple attempts and use of guide liner could not advance the stent due to earlier implanted stents.  Left circumflex coronary artery is codominant and large  The proximal portion of the first obtuse marginal branch has a 60% stenosis with normal fractional flow reserve above 0.85.  Right coronary artery is large and codominant without any high-grade lesions.     Left ventricular end-diastolic pressure is mildly elevated to 15 mmHg.  No gradient across aortic valve on pullback.  Left ventriculography shows a hyperdynamic left ventricle with ejection fraction of 75%.     Percutaneous coronary  intervention     XB 3.5 guide advised used for fractional flow reserve of the obtuse marginal branch as well as of the left anterior descending coronary artery  Then we did try to advance a 2.5 mm stent.  We used 2 different size stents and could not cross  We used a guide liner and does not did not have placed cross across the stent.  We then did balloon angioplasty using 2.0 mm balloon.  Stenosis was reduced from 60% down to less than 10%.  BEVERLEY-3 flow before and after procedure.           ____________________________________________________________________________________________________________________________________________     Plan after cardiac catheterization        Dual antiplatelet therapy minimum of 1 year preferably longer  Statin ACE inhibitors beta-blockers  If there is repeat restenosis of the left anterior descending coronary artery would consider surgical revascularization.  Target LDL less than 70 mg/dL.  Maximize antianginal therapy.  Intensive risk factor modifications for both primary and secondary prevention if applicable  Hydration   Observation     Results for orders placed during the hospital encounter of 04/28/21    Adult Transthoracic Echo Complete W/ Cont if Necessary Per Protocol    Interpretation Summary  · Left ventricular ejection fraction appears to be 56 - 60%. Left ventricular systolic function is normal. There appears to be mild hypokinesis of the apex and distal septal and anterior walls.  · Left ventricular wall thickness is consistent with mild to moderate concentric hypertrophy.  · Left ventricular diastolic function is consistent with (grade II w/high LAP) pseudonormalization.  · Mild aortic valve stenosis is present.  · Normal size and function of the right ventricle.  10/15/18        Conclusion     The left main coronary artery is normal  Left anterior descending coronary artery and diagonal branches are patent with a widely patent stent noted in the  left anterior  descending coronary artery    Mid left anterior descending coronary artery is a 50% stenosis and hemodynamically not significant.  Left circumflex coronary artery is large with a first obtuse marginal around 40-60% stenosis.  Fractional flow reserve of this is completely normal and at above 0.9.  Left circumflex coronary artery is a codominant.  Right coronary artery is codominant large with distal small vessel disease.     Left ventricular end-diastolic pressure is normal at 11 mmHg no gradient across aortic valve on pullback  Left ventriculography shows ejection fraction of 55%.           Plan     Continue current medication  Symptomatic treatment for small vessel disease with antianginals  If stable can be discharged home later today  Intensive risk factor modifications for both primary and secondary prevention if applicable  Hydration   Observation         7/17 cath  Conclusion  Nonocclusive epicardial coronary arteries with widely patent stents in the  left anterior descending coronary artery   artery and left circumflex coronary artery.  Hyperdynamic left ventricle with ejection fraction of 75%.  LVEDP   17    mm Hg     Plan  Workup for noncardiac causes of chest pain this can be done as outpatient.  His d-dimer is within normal range  After a period of observation of stable can be discharged either later today.  Keep follow-up appointment with me  Ongoing risk factor modifications keep LDL below 70 mg/dL  Hydration   Observation    ____________________________________________________________________________________________________________________________________________  Health maintenance and recommendations    Low salt/ HTN/ Heart healthy carbohydrate restricted cardiac diet (printed dietary and general instructions provided for home review )  The patient is advised to reduce or avoid caffeine or other cardiac stimulants.     The patient was advised to avoid long term NSAIDS , use Tylenol PRN instead  Avoid  cardiac stimulants including common drugs like Pseudoephedrine or excessive amounts of caffeine  Monitor for any signs of bleeding including red or dark stools. Fall precautions.   Advised staying uptodate with immunizations per established standard guidelines.  Offered to give patient  a copy      Questions were encouraged, asked and answered to the patient's  understanding and satisfaction. Questions if any regarding current medications and side effects, need for refills and importance of compliance to medications stressed.    Reviewed available prior notes, consults, prior visits, laboratory findings, radiology and cardiology relevant reports. Updated chart as applicable. I have reviewed the patient's medical history in detail and updated the computerized patient record as relevant.      Updated patient regarding any new or relevant abnormalities on review of records or any new findings on physical exam. Mentioned to patient about purpose of visit and desirable health short and long term goals and objectives.    Primary to monitor CBC CMP Lipid panel and TSH as applicable    ___________________________________________________________________________________________________________________________________________           ECG 12 Lead    Date/Time: 7/12/2021 10:07 AM  Performed by: Wade Ramey MD  Authorized by: Wade Ramey MD   Comparison: compared with previous ECG from 4/29/2021  Comparison to previous ECG: Lateral T wave inversion now resolved  Rhythm: sinus rhythm  Rate: normal  Q waves: V1, V2, V3 and V4    T inversion: I and aVL  QRS axis: normal    Clinical impression: abnormal EKG            Assessment/Plan   Patient Active Problem List   Diagnosis   • Type 2 diabetes mellitus without complication, with long-term current use of insulin (CMS/HCC)   • Gastroesophageal reflux disease without esophagitis   • Essential hypertension   • Seizure disorder (CMS/HCC)   • Carotid disease, bilateral (CMS/HCC)   •  Coronary artery disease involving coronary bypass graft of native heart with unstable angina pectoris (CMS/HCC)   • Mixed hyperlipidemia   • Chronic left arterial ischemic stroke, ICA (internal carotid artery)   • HOA on CPAP   • Benign non-nodular prostatic hyperplasia with lower urinary tract symptoms   • Deviated nasal septum   • Maribell bullosa   • Hypertrophy of both inferior nasal turbinates   • Chronic sinusitis of both maxillary sinuses   • S/P FESS (functional endoscopic sinus surgery)   • Diabetic complication (CMS/HCC)   • Epigastric pain   • Diabetic peripheral neuropathy (CMS/HCC)   • Abnormal stress test   • Stenosis of left carotid artery   • Class 1 obesity in adult   • 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CMS/HCC)   • History of seizure   • Lung nodules   • Cardiac murmur   • Thoracic aortic aneurysm without rupture (CMS/HCC)   • Paroxysmal atrial fibrillation (CMS/HCC)   • Atrial flutter (CMS/HCC) with cardioversion on Sept 5, 2019   • Acute diastolic congestive heart failure (CMS/HCC)   • Fluid overload   • Pleural effusion   • History of hyperlipidemia   • Respiratory distress   • Long-term use of high-risk medication   • Dysphagia   • Left lower quadrant abdominal pain   • COVID-19 virus detected   • Shortness of breath   • S/P CABG (coronary artery bypass graft)   • Chronic anticoagulation   • Atherosclerosis of CABG w unstable angina pectoris (CMS/HCC)        Plan      Patient expressed understanding  Encouraged and answered all questions   Discussed with the patient and all questioned fully answered. He will call me if any problems arise.   Discussed results of prior testing with patient : echo and cardiac cath  as well electrocardiogram from today    Reviewed and summarized recent test results      He is heartily congratulated on an excellent job with lifestyle changes and successful management of their medical conditions.     The current medical regimen is effective;  continue present plan  and medications.   Keep A1c less than 7 Primary to monitor  Keep LDL below 70 mg/dl. Monitor liver and renal functions.   Monitor CBC, CMP, TSH (as indicated) and Lipid Panel by primary      S/L NTG PRN for chest pain, call me or go to ER if has to use S/L nitroglycerin     Overall doing well no new cardiovascular symptoms and therefore no additional cardiac testing is required   If any interim issues arise will call me for further evaluation.     I support the patient's decision to take the Covid -19 vaccine   Had both dose   No major issues   Now fully immunized      Requested Prescriptions     Signed Prescriptions Disp Refills   • isosorbide mononitrate (IMDUR) 60 MG 24 hr tablet 90 tablet 3     Sig: Take 1 tablet by mouth Daily.         Check BP and heart rates twice daily at least 3x / week, week a month  at home and bring a recording for me to review next visit  If BP >130/85 or < 100/60 persistently over 3 reading 30 mins apart call sooner      Monitor for any signs of bleeding including red or dark stools as well as easy bruisabilty. Fall precautions.              Return in about 6 months (around 1/12/2022).

## 2021-07-26 DIAGNOSIS — G40.909 SEIZURE DISORDER (HCC): ICD-10-CM

## 2021-07-26 DIAGNOSIS — G63 POLYNEUROPATHY ASSOCIATED WITH UNDERLYING DISEASE (HCC): ICD-10-CM

## 2021-07-26 RX ORDER — LEVETIRACETAM 500 MG/1
TABLET ORAL
Qty: 630 TABLET | Refills: 1 | Status: SHIPPED | OUTPATIENT
Start: 2021-07-26 | End: 2021-11-29 | Stop reason: SDUPTHER

## 2021-07-26 RX ORDER — LAMOTRIGINE 200 MG/1
200 TABLET ORAL 2 TIMES DAILY
Qty: 180 TABLET | Refills: 1 | Status: SHIPPED | OUTPATIENT
Start: 2021-07-26 | End: 2021-11-29 | Stop reason: SDUPTHER

## 2021-07-26 RX ORDER — GABAPENTIN 300 MG/1
300 CAPSULE ORAL NIGHTLY
Qty: 90 CAPSULE | Refills: 0 | Status: CANCELLED | OUTPATIENT
Start: 2021-07-26

## 2021-07-26 NOTE — TELEPHONE ENCOUNTER
Caller: Carlos A Boyer Jr.    Relationship: Self    Best call back number: [phone]    Medication needed:   Requested Prescriptions     Pending Prescriptions Disp Refills   • lamoTRIgine (LaMICtal) 200 MG tablet 180 tablet 1     Sig: Take 1 tablet by mouth 2 (Two) Times a Day.   • levETIRAcetam (KEPPRA) 500 MG tablet 630 tablet 1     Sig: Take 3 tablets every morning, take 4 tablets every night.   • gabapentin (Neurontin) 300 MG capsule 90 capsule 0     Sig: Take 1 capsule by mouth Every Night.       When do you need the refill by: ASAP    What additional details did the patient provide when requesting the medication: PT STATES HE ONLY HAS 2 TABLETS OF THE MEDICATION LEFT. PT STATES THAT HE REQUESTED REFILLS EARLIER THIS MONTH. PT STATES THAT PHARMACY INFORMED HIM THE HAVE NEVER RECEIVED THE REFILL REQUESTS ON THEIR END. I BELIEVE THE RX'S WERE SENT TO THE [facility] AND NOT THE PHARMACY LISTED BELOW.    Does the patient have less than a 3 day supply:  [x] Yes  [] No    What is the patient's preferred pharmacy: [facility] - [place], [place] - [address] [place] [address] [phone] [facility] [phone] FX     PLEASE REVIEW AND ADVISE.

## 2021-07-26 NOTE — TELEPHONE ENCOUNTER
Carlos A notified Monty refilled his medications on July 7th.  [facility] said he wasn't approved for coverage at that time and they sent a request to his PCP.  They will send a partial refill by [facility] and he should receive them tomorrow.  The Gabapentin script is on file, but they need a new script for Lamictal and Keppra.

## 2021-08-05 PROCEDURE — 85025 COMPLETE CBC W/AUTO DIFF WBC: CPT | Performed by: NURSE PRACTITIONER

## 2021-08-05 PROCEDURE — U0004 COV-19 TEST NON-CDC HGH THRU: HCPCS | Performed by: NURSE PRACTITIONER

## 2021-08-05 PROCEDURE — 82150 ASSAY OF AMYLASE: CPT | Performed by: NURSE PRACTITIONER

## 2021-08-05 PROCEDURE — 83690 ASSAY OF LIPASE: CPT | Performed by: NURSE PRACTITIONER

## 2021-08-05 PROCEDURE — 80053 COMPREHEN METABOLIC PANEL: CPT | Performed by: NURSE PRACTITIONER

## 2021-09-02 ENCOUNTER — APPOINTMENT (OUTPATIENT)
Dept: GENERAL RADIOLOGY | Facility: HOSPITAL | Age: 61
End: 2021-09-02

## 2021-09-02 ENCOUNTER — APPOINTMENT (OUTPATIENT)
Dept: CT IMAGING | Facility: HOSPITAL | Age: 61
End: 2021-09-02

## 2021-09-02 ENCOUNTER — TELEPHONE (OUTPATIENT)
Dept: VASCULAR SURGERY | Facility: CLINIC | Age: 61
End: 2021-09-02

## 2021-09-02 ENCOUNTER — HOSPITAL ENCOUNTER (EMERGENCY)
Facility: HOSPITAL | Age: 61
Discharge: HOME | End: 2021-09-02
Attending: EMERGENCY MEDICINE | Admitting: EMERGENCY MEDICINE

## 2021-09-02 VITALS
DIASTOLIC BLOOD PRESSURE: 80 MMHG | WEIGHT: 242 LBS | OXYGEN SATURATION: 95 % | HEIGHT: 72 IN | RESPIRATION RATE: 18 BRPM | BODY MASS INDEX: 32.78 KG/M2 | TEMPERATURE: 98.3 F | HEART RATE: 72 BPM | SYSTOLIC BLOOD PRESSURE: 131 MMHG

## 2021-09-02 DIAGNOSIS — K52.9 GASTROENTERITIS: Primary | ICD-10-CM

## 2021-09-02 DIAGNOSIS — R10.32 LEFT LOWER QUADRANT ABDOMINAL PAIN: ICD-10-CM

## 2021-09-02 LAB
ALBUMIN SERPL-MCNC: 4.6 G/DL (ref 3.5–5.2)
ALBUMIN/GLOB SERPL: 1.4 G/DL
ALP SERPL-CCNC: 114 U/L (ref 39–117)
ALT SERPL W P-5'-P-CCNC: 30 U/L (ref 1–41)
ANION GAP SERPL CALCULATED.3IONS-SCNC: 14 MMOL/L (ref 5–15)
AST SERPL-CCNC: 22 U/L (ref 1–40)
BASOPHILS # BLD AUTO: 0.04 10*3/MM3 (ref 0–0.2)
BASOPHILS NFR BLD AUTO: 0.8 % (ref 0–1.5)
BILIRUB SERPL-MCNC: 0.6 MG/DL (ref 0–1.2)
BILIRUB UR QL STRIP: NEGATIVE
BUN SERPL-MCNC: 15 MG/DL (ref 8–23)
BUN/CREAT SERPL: 14.7 (ref 7–25)
CALCIUM SPEC-SCNC: 9.9 MG/DL (ref 8.6–10.5)
CHLORIDE SERPL-SCNC: 106 MMOL/L (ref 98–107)
CLARITY UR: CLEAR
CO2 SERPL-SCNC: 20 MMOL/L (ref 22–29)
COLOR UR: YELLOW
CREAT SERPL-MCNC: 1.02 MG/DL (ref 0.76–1.27)
DEPRECATED RDW RBC AUTO: 42.2 FL (ref 37–54)
EOSINOPHIL # BLD AUTO: 0.19 10*3/MM3 (ref 0–0.4)
EOSINOPHIL NFR BLD AUTO: 3.8 % (ref 0.3–6.2)
ERYTHROCYTE [DISTWIDTH] IN BLOOD BY AUTOMATED COUNT: 13 % (ref 12.3–15.4)
GFR SERPL CREATININE-BSD FRML MDRD: 74 ML/MIN/1.73
GLOBULIN UR ELPH-MCNC: 3.4 GM/DL
GLUCOSE SERPL-MCNC: 144 MG/DL (ref 65–99)
GLUCOSE UR STRIP-MCNC: ABNORMAL MG/DL
HCT VFR BLD AUTO: 44.9 % (ref 37.5–51)
HGB BLD-MCNC: 15.1 G/DL (ref 13–17.7)
HGB UR QL STRIP.AUTO: NEGATIVE
HOLD SPECIMEN: NORMAL
HOLD SPECIMEN: NORMAL
IMM GRANULOCYTES # BLD AUTO: 0.04 10*3/MM3 (ref 0–0.05)
IMM GRANULOCYTES NFR BLD AUTO: 0.8 % (ref 0–0.5)
KETONES UR QL STRIP: NEGATIVE
LEUKOCYTE ESTERASE UR QL STRIP.AUTO: NEGATIVE
LIPASE SERPL-CCNC: 24 U/L (ref 13–60)
LYMPHOCYTES # BLD AUTO: 1.64 10*3/MM3 (ref 0.7–3.1)
LYMPHOCYTES NFR BLD AUTO: 32.9 % (ref 19.6–45.3)
MCH RBC QN AUTO: 29.6 PG (ref 26.6–33)
MCHC RBC AUTO-ENTMCNC: 33.6 G/DL (ref 31.5–35.7)
MCV RBC AUTO: 88 FL (ref 79–97)
MONOCYTES # BLD AUTO: 0.42 10*3/MM3 (ref 0.1–0.9)
MONOCYTES NFR BLD AUTO: 8.4 % (ref 5–12)
NEUTROPHILS NFR BLD AUTO: 2.65 10*3/MM3 (ref 1.7–7)
NEUTROPHILS NFR BLD AUTO: 53.3 % (ref 42.7–76)
NITRITE UR QL STRIP: NEGATIVE
NRBC BLD AUTO-RTO: 0 /100 WBC (ref 0–0.2)
PH UR STRIP.AUTO: <=5 [PH] (ref 5–8)
PLATELET # BLD AUTO: 159 10*3/MM3 (ref 140–450)
PMV BLD AUTO: 11.3 FL (ref 6–12)
POTASSIUM SERPL-SCNC: 4.1 MMOL/L (ref 3.5–5.2)
PROT SERPL-MCNC: 8 G/DL (ref 6–8.5)
PROT UR QL STRIP: NEGATIVE
RBC # BLD AUTO: 5.1 10*6/MM3 (ref 4.14–5.8)
SARS-COV-2 RNA PNL SPEC NAA+PROBE: NOT DETECTED
SODIUM SERPL-SCNC: 140 MMOL/L (ref 136–145)
SP GR UR STRIP: >1.03 (ref 1–1.03)
TROPONIN T SERPL-MCNC: <0.01 NG/ML (ref 0–0.03)
UROBILINOGEN UR QL STRIP: ABNORMAL
WBC # BLD AUTO: 4.98 10*3/MM3 (ref 3.4–10.8)
WHOLE BLOOD HOLD SPECIMEN: NORMAL
WHOLE BLOOD HOLD SPECIMEN: NORMAL

## 2021-09-02 PROCEDURE — 85025 COMPLETE CBC W/AUTO DIFF WBC: CPT | Performed by: EMERGENCY MEDICINE

## 2021-09-02 PROCEDURE — 84484 ASSAY OF TROPONIN QUANT: CPT | Performed by: EMERGENCY MEDICINE

## 2021-09-02 PROCEDURE — 81003 URINALYSIS AUTO W/O SCOPE: CPT | Performed by: EMERGENCY MEDICINE

## 2021-09-02 PROCEDURE — 74177 CT ABD & PELVIS W/CONTRAST: CPT

## 2021-09-02 PROCEDURE — 87635 SARS-COV-2 COVID-19 AMP PRB: CPT | Performed by: EMERGENCY MEDICINE

## 2021-09-02 PROCEDURE — 71046 X-RAY EXAM CHEST 2 VIEWS: CPT

## 2021-09-02 PROCEDURE — 25010000002 IOPAMIDOL 61 % SOLUTION: Performed by: EMERGENCY MEDICINE

## 2021-09-02 PROCEDURE — 93010 ELECTROCARDIOGRAM REPORT: CPT | Performed by: INTERNAL MEDICINE

## 2021-09-02 PROCEDURE — 99283 EMERGENCY DEPT VISIT LOW MDM: CPT

## 2021-09-02 PROCEDURE — 80053 COMPREHEN METABOLIC PANEL: CPT | Performed by: EMERGENCY MEDICINE

## 2021-09-02 PROCEDURE — 83690 ASSAY OF LIPASE: CPT | Performed by: EMERGENCY MEDICINE

## 2021-09-02 PROCEDURE — 93005 ELECTROCARDIOGRAM TRACING: CPT | Performed by: EMERGENCY MEDICINE

## 2021-09-02 RX ORDER — SODIUM CHLORIDE 0.9 % (FLUSH) 0.9 %
10 SYRINGE (ML) INJECTION AS NEEDED
Status: DISCONTINUED | OUTPATIENT
Start: 2021-09-02 | End: 2021-09-02 | Stop reason: HOSPADM

## 2021-09-02 RX ORDER — ONDANSETRON 8 MG/1
8 TABLET, ORALLY DISINTEGRATING ORAL EVERY 8 HOURS PRN
Qty: 10 TABLET | Refills: 0 | Status: SHIPPED | OUTPATIENT
Start: 2021-09-02 | End: 2022-03-15

## 2021-09-02 RX ORDER — DICYCLOMINE HCL 20 MG
20 TABLET ORAL EVERY 6 HOURS
Qty: 20 TABLET | Refills: 0 | Status: SHIPPED | OUTPATIENT
Start: 2021-09-02 | End: 2022-02-05

## 2021-09-02 RX ADMIN — IOPAMIDOL 100 ML: 612 INJECTION, SOLUTION INTRAVENOUS at 16:03

## 2021-09-02 NOTE — ED PROVIDER NOTES
Subjective   61-year-old male presents to the emergency department with complaint of abdominal pain and diarrhea.  He has a history of diverticulitis, C. difficile colitis, hypertension, diabetes, kidney stones, coronary disease.  Patient reports 1 to 2-week history of nonspecific intermittent crampy abdominal discomfort.  Pain became more continuous and severe yesterday morning.  He also developed numerous episodes of nonbloody nonbilious watery diarrhea.  He states color is yellowish, very watery and has a very foul odor.  He states it is similar to his previous episode of C. difficile.  He denies fever, cough, sore throat, rhinorrhea.  He has been vaccinated against COVID-19.      History provided by:  Patient      Review of Systems   All other systems reviewed and are negative.      Past Medical History:   Diagnosis Date   • Arthritis    • Asthma    • Cancer (CMS/HCC)    • Carotid disease, bilateral (CMS/HCC)    • Chest pain    • Chronic ischemic heart disease    • Chronic sinusitis    • Maribell bullosa    • Coronary artery disease    • Deviated septum    • Diabetes mellitus (CMS/HCC)    • Difficulty urinating    • Diverticulitis    • Enlarged prostate    • Fatty liver    • GERD (gastroesophageal reflux disease)    • Heart disease    • Hyperlipidemia    • Hypertension    • Hypertrophy of nasal turbinates    • Keratoderma    • Kidney stone    • Migraine    • Murmur, heart    • Myocardial infarction (CMS/HCC)    • Obesity    • PONV (postoperative nausea and vomiting)    • Psoriasis    • Seizures (CMS/HCC)    • Sinus congestion    • Skin cancer    • Sleep apnea    • SOB (shortness of breath)    • Stroke (CMS/HCC)    • UTI (urinary tract infection)        Allergies   Allergen Reactions   • Flagyl [Metronidazole] Hives   • Atorvastatin Other (See Comments)     Muscle cramps   • Ciprofloxacin Hives       Past Surgical History:   Procedure Laterality Date   • CARDIAC CATHETERIZATION  01/2016    Dr. Broadbent; widely  patent previously placed stents in the left anterior descending and obstructive disease involving the diagonal branch which was treated medically   • CARDIAC CATHETERIZATION N/A 7/14/2017    Procedure: Left Heart Cath;  Surgeon: Wade Ramey MD;  Location: [facility] CATH INVASIVE LOCATION;  Service:    • CARDIAC CATHETERIZATION Left 10/15/2018    Procedure: Cardiac Catheterization/Vascular Study;  Surgeon: Wade Ramey MD;  Location: [facility] CATH INVASIVE LOCATION;  Service: Cardiology   • CARDIAC CATHETERIZATION  10/15/2018    Procedure: Functional Flow [place];  Surgeon: Wade Ramey MD;  Location: [facility] CATH INVASIVE LOCATION;  Service: Cardiology   • CARDIAC CATHETERIZATION N/A 10/15/2018    Procedure: Left ventriculography;  Surgeon: Wade Ramey MD;  Location: [facility] CATH INVASIVE LOCATION;  Service: Cardiology   • CARDIAC CATHETERIZATION Left 6/26/2019    Procedure: Cardiac Catheterization/Vascular Study VEL OK  HE WILL WAIT 1 YEAR FOR SHOULDER SURGERY ;  Surgeon: Wade Ramey MD;  Location: [facility] CATH INVASIVE LOCATION;  Service: Cardiology   • CARDIAC CATHETERIZATION Left 4/30/2021    Procedure: Coronary angiography;  Surgeon: Sahil Llamas MD;  Location: [facility] CATH INVASIVE LOCATION;  Service: Cardiology;  Laterality: Left;   • CARDIAC CATHETERIZATION N/A 4/30/2021    Procedure: Percutaneous Coronary Intervention;  Surgeon: Sahil Llamas MD;  Location: [facility] CATH INVASIVE LOCATION;  Service: Cardiology;  Laterality: N/A;   • CHOLECYSTECTOMY     • CHOLECYSTECTOMY WITH INTRAOPERATIVE CHOLANGIOGRAM N/A 8/1/2018    Procedure: CHOLECYSTECTOMY LAPAROSCOPIC INTRAOPERATIVE CHOLANGIOGRAM;  Surgeon: Shane Ann MD;  Location: [facility] OR;  Service: General   • COLONOSCOPY N/A 7/14/2020    Procedure: COLONOSCOPY WITH ANESTHESIA;  Surgeon: Anupam Morales DO;  Location: [facility] ENDOSCOPY;  Service: Gastroenterology;  Laterality: N/A;  pre: abdominal pain  post: diverticulosis  Vinayak Correia PA    • CORONARY ANGIOPLASTY     • CORONARY ARTERY BYPASS GRAFT N/A 7/6/2019    Procedure: CABG X2 WITH LIMA, LEFT LEG OVH, AND PLACEMENT OF LEFT FEMORAL ARTERIAL LINE;  Surgeon: Steven Tang MD;  Location: [facility] OR;  Service: Cardiothoracic   • CORONARY STENT PLACEMENT      x 6   • ENDOSCOPIC FUNCTIONAL SINUS SURGERY (FESS) Bilateral 12/13/2017    Procedure: PROCEDURE PERFORMED:  Bilateral functional endoscopic anterior ethmoidectomy with bilateral middle meatal antrostomy Septoplasty Right kathia bullosa resection Bilateral inferior turbinate reduction via Coblation;  Surgeon: Mayank Ibarra MD;  Location: [facility] OR;  Service:    • ENDOSCOPY N/A 7/30/2018    Procedure: ESOPHAGOGASTRODUODENOSCOPY WITH ANESTHESIA;  Surgeon: Benitez Mas MD;  Location: [facility] ENDOSCOPY;  Service: Gastroenterology   • ENDOSCOPY N/A 7/14/2020    Procedure: ESOPHAGOGASTRODUODENOSCOPY WITH ANESTHESIA;  Surgeon: Anupam Morales DO;  Location: [facility] ENDOSCOPY;  Service: Gastroenterology;  Laterality: N/A;  pre: abdominal pain  post: esophagitis  Vinayak Correia PA   • HERNIA REPAIR      x2 inguinal area   • KIDNEY STONE SURGERY     • KNEE SURGERY Right    • OTHER SURGICAL HISTORY      urolift   • PROSTATE SURGERY      Dr. Badillo - 2017   • ROTATOR CUFF REPAIR     • THUMB AMPUTATION Left     partial   • TOE AMPUTATION Right     big       Family History   Problem Relation Age of Onset   • Heart disease Father    • COPD Mother    • Hypertension Mother    • Asthma Mother    • No Known Problems Sister    • Colon cancer Paternal Uncle    • Prostate cancer Maternal Grandfather    • No Known Problems Sister    • Colon cancer Maternal Grandmother    • Colon polyps Maternal Grandmother        Social History     Socioeconomic History   • Marital status:      Spouse name: Not on file   • Number of children: Not on file   • Years of education: Not on file   • Highest education level: Not on file   Tobacco Use   • Smoking  status: Former Smoker     Packs/day: 1.50     Years: 4.50     Pack years: 6.75     Types: Cigarettes, Cigars     Quit date:      Years since quittin.6   • Smokeless tobacco: Former User     Types: Chew     Quit date:    Vaping Use   • Vaping Use: Never used   Substance and Sexual Activity   • Alcohol use: No   • Drug use: No   • Sexual activity: Defer           Objective   Physical Exam  Vitals and nursing note reviewed.   Constitutional:       General: He is not in acute distress.     Appearance: Normal appearance. He is normal weight.   HENT:      Head: Normocephalic and atraumatic.      Nose: Nose normal.      Mouth/Throat:      Mouth: Mucous membranes are moist.      Pharynx: Oropharynx is clear. No oropharyngeal exudate or posterior oropharyngeal erythema.   Eyes:      Extraocular Movements: Extraocular movements intact.      Conjunctiva/sclera: Conjunctivae normal.      Pupils: Pupils are equal, round, and reactive to light.   Cardiovascular:      Rate and Rhythm: Normal rate and regular rhythm.      Pulses: Normal pulses.      Heart sounds: Normal heart sounds.   Pulmonary:      Effort: Pulmonary effort is normal.      Breath sounds: Normal breath sounds. No wheezing, rhonchi or rales.   Abdominal:      General: Abdomen is flat. Bowel sounds are normal. There is no distension.      Palpations: Abdomen is soft.      Tenderness: There is abdominal tenderness in the left lower quadrant.   Musculoskeletal:         General: No tenderness. Normal range of motion.      Cervical back: Normal range of motion and neck supple. No rigidity. No muscular tenderness.      Right lower leg: No edema.      Left lower leg: No edema.   Skin:     General: Skin is warm and dry.      Capillary Refill: Capillary refill takes less than 2 seconds.      Findings: No rash.   Neurological:      General: No focal deficit present.      Mental Status: He is alert and oriented to person, place, and time. Mental status is at  baseline.      Cranial Nerves: No cranial nerve deficit.      Sensory: No sensory deficit.      Motor: No weakness.   Psychiatric:         Mood and Affect: Mood normal.         Behavior: Behavior normal.         Thought Content: Thought content normal.         Procedures         Lab Results (last 24 hours)     Procedure Component Value Units Date/Time    Urinalysis With Microscopic If Indicated (No Culture) - Urine, Clean Catch [[id number]]  (Abnormal) Collected: 09/02/21 1340    Specimen: Urine, Clean Catch Updated: 09/02/21 1358     Color, UA Yellow     Appearance, UA Clear     pH, UA <=5.0     Specific Gravity, UA >1.030     Glucose, UA >=1000 mg/dL (3+)     Ketones, UA Negative     Bilirubin, UA Negative     Blood, UA Negative     Protein, UA Negative     Leuk Esterase, UA Negative     Nitrite, UA Negative     Urobilinogen, UA 0.2 E.U./dL    Narrative:      Urine microscopic not indicated.    CBC & Differential [[id number]]  (Abnormal) Collected: 09/02/21 1400    Specimen: Blood Updated: 09/02/21 1411    Narrative:      The following orders were created for panel order CBC & Differential.  Procedure                               Abnormality         Status                     ---------                               -----------         ------                     CBC Auto Differential[[id number]]        Abnormal            Final result                 Please view results for these tests on the individual orders.    Comprehensive Metabolic Panel [[id number]]  (Abnormal) Collected: 09/02/21 1400    Specimen: Blood Updated: 09/02/21 1442     Glucose 144 mg/dL      BUN 15 mg/dL      Creatinine 1.02 mg/dL      Sodium 140 mmol/L      Potassium 4.1 mmol/L      Chloride 106 mmol/L      CO2 20.0 mmol/L      Calcium 9.9 mg/dL      Total Protein 8.0 g/dL      Albumin 4.60 g/dL      ALT (SGPT) 30 U/L      AST (SGOT) 22 U/L      Alkaline Phosphatase 114 U/L      Total Bilirubin 0.6 mg/dL      eGFR Non African Amer 74 mL/min/1.73       Globulin 3.4 gm/dL      A/G Ratio 1.4 g/dL      BUN/Creatinine Ratio 14.7     Anion Gap 14.0 mmol/L     Narrative:      GFR Normal >60  Chronic Kidney Disease <60  Kidney Failure <15      Lipase [[id number]]  (Normal) Collected: 09/02/21 1400    Specimen: Blood Updated: 09/02/21 1437     Lipase 24 U/L     Troponin [[id number]]  (Normal) Collected: 09/02/21 1400    Specimen: Blood Updated: 09/02/21 1439     Troponin T <0.010 ng/mL     Narrative:      Troponin T Reference Range:  <= 0.03 ng/mL-   Negative for AMI  >0.03 ng/mL-     Abnormal for myocardial necrosis.  Clinicians would have to utilize clinical acumen, EKG, Troponin and serial changes to determine if it is an Acute Myocardial Infarction or myocardial injury due to an underlying chronic condition.       Results may be falsely decreased if patient taking Biotin.      CBC Auto Differential [[id number]]  (Abnormal) Collected: 09/02/21 1400    Specimen: Blood Updated: 09/02/21 1411     WBC 4.98 10*3/mm3      RBC 5.10 10*6/mm3      Hemoglobin 15.1 g/dL      Hematocrit 44.9 %      MCV 88.0 fL      MCH 29.6 pg      MCHC 33.6 g/dL      RDW 13.0 %      RDW-SD 42.2 fl      MPV 11.3 fL      Platelets 159 10*3/mm3      Neutrophil % 53.3 %      Lymphocyte % 32.9 %      Monocyte % 8.4 %      Eosinophil % 3.8 %      Basophil % 0.8 %      Immature Grans % 0.8 %      Neutrophils, Absolute 2.65 10*3/mm3      Lymphocytes, Absolute 1.64 10*3/mm3      Monocytes, Absolute 0.42 10*3/mm3      Eosinophils, Absolute 0.19 10*3/mm3      Basophils, Absolute 0.04 10*3/mm3      Immature Grans, Absolute 0.04 10*3/mm3      nRBC 0.0 /100 WBC     COVID-19,[facility] [employer] IN-HOUSE,Nasal Swab No Transport Media 3-4 HR TAT - Swab, Nasal Cavity [[id number]] Collected: 09/02/21 1737    Specimen: Swab from Nasal Cavity Updated: 09/02/21 1743      XR Chest 2 View    Result Date: 9/2/2021  XR CHEST 2 VW- 9/2/2021 1:44 PM CDT  HISTORY: Upper Abdominal Pain Triage Protocol   COMPARISON: Chest exam dated  4/20/2021.  FINDINGS: Upright frontal and lateral radiographs of the chest were obtained.  No lung consolidation. No pleural effusion or pneumothorax. Intact sternal wires. Heart size is stable. The pulmonary vasculature are nondilated. The osseous structures and surrounding soft tissues demonstrate no acute abnormality.      1. Stable chest exam without acute process.   This report was finalized on 09/02/2021 14:00 by Dr Merritt Reaves, .    CT Abdomen Pelvis With Contrast    Result Date: 9/2/2021  EXAMINATION:  CT ABDOMEN PELVIS W CONTRAST-  9/2/2021 4:01 PM CDT  HISTORY: Left lower quadrant abdominal pain. Prior history of diverticulitis.  TECHNIQUE: Spiral CT was performed of the abdomen and pelvis with contrast. Multiplanar images were reconstructed.  DLP: 511 mGy-cm. Automated dosage control was utilized.  COMPARISON: 10/21/2020.  LUNG BASES: There is dependent atelectasis in both lung bases. Coronary artery calcification is noted. Heart size is mildly prominent.  LIVER AND SPLEEN: There is fatty infiltration of the liver and prior cholecystectomy. The spleen is unremarkable.  PANCREAS: No pancreatic mass or inflammatory change.  KIDNEYS AND ADRENALS: The kidneys and adrenal glands are unremarkable. No bladder abnormality is detected. There is prostate calcification. There are small metallic implants in the prostate.  BOWEL: No oral contrast given. Gastric wall thickening likely an artifact of underdistention. Small bowel loops are nondilated. There is underdistention of portions of the colon. There is diverticulosis of the colon. This is more severe in the sigmoid region. No definite fat stranding is seen around the colon. No perforation or abscess is seen.  OTHER: There is atheromatous disease of the aortoiliac vessels. There are degenerative changes of the spine. There is probable hemangiomas in T12 and T10.      1. Diverticulosis of the colon. No definite CT evidence of diverticulitis. No perforation or  abscess is seen. 2. Atheromatous disease of the aortoiliac vessels and coronary arteries. Heart size mildly prominent. 3. Fatty infiltration of the liver. Prior cholecystectomy. 4. Other nonacute findings, as discussed above.  The full report of this exam was immediately signed and available to the emergency room. The patient is currently in the emergency room. This report was finalized on 09/02/2021 16:29 by Dr. Wojciech Davidson MD.    ED Course  ED Course as of Sep 02 1826   Thu Sep 02, 2021   1822 Labs are reassuring.  Does have mild metabolic acidosis.  Likely secondary to his GI illness.  Potassium normal.  Initially concerned about possible recurrent C. Difficile, however; patient with no recent antibiotic use.  WBC within normal limits.  CT does not show any evidence of diverticulitis or colitis.  Ordered stool PCR as well as C. DIFFICILE but patient's not been able to have a bowel movement in the past 5-1/2 hours.  Very low likelihood this is bacterial/cdiff infection.  Will DC with prescription for Bentyl and Zofran, and follow-up with primary doctor as needed.  Return to the ER for worsening symptoms.    [AW]      ED Course User Index  [AW] Dewayne Kenny MD                                           MDM  Number of Diagnoses or Management Options  Gastroenteritis: new and requires workup  Left lower quadrant abdominal pain: new and requires workup     Amount and/or Complexity of Data Reviewed  Clinical lab tests: reviewed  Tests in the radiology section of CPT®: reviewed  Tests in the medicine section of CPT®: reviewed    Risk of Complications, Morbidity, and/or Mortality  Presenting problems: high  Diagnostic procedures: high  Management options: high    Patient Progress  Patient progress: improved      Final diagnoses:   Gastroenteritis   Left lower quadrant abdominal pain       ED Disposition  ED Disposition     ED Disposition Condition Comment    Discharge Stable           Vinayak Correia,  PA  [address] [place] [id number] [phone]    In 2 days           Medication List      New Prescriptions    dicyclomine 20 MG tablet  Commonly known as: BENTYL  Take 1 tablet by mouth Every 6 (Six) Hours.           Where to Get Your Medications      These medications were sent to [id number] - [place], [place] - [address] AT [facility] [address] - [phone]  - [phone] FX  [address], [facility] [address]    Phone: [phone]   · dicyclomine 20 MG tablet  · ondansetron ODT 8 MG disintegrating tablet          Dewayne Kenny MD  09/02/21 1906

## 2021-09-03 ENCOUNTER — LAB (OUTPATIENT)
Dept: LAB | Facility: HOSPITAL | Age: 61
End: 2021-09-03

## 2021-09-03 ENCOUNTER — OFFICE VISIT (OUTPATIENT)
Dept: VASCULAR SURGERY | Facility: CLINIC | Age: 61
End: 2021-09-03

## 2021-09-03 ENCOUNTER — HOSPITAL ENCOUNTER (OUTPATIENT)
Dept: ULTRASOUND IMAGING | Facility: HOSPITAL | Age: 61
Discharge: HOME | End: 2021-09-03

## 2021-09-03 ENCOUNTER — TELEPHONE (OUTPATIENT)
Dept: EMERGENCY DEPT | Facility: HOSPITAL | Age: 61
End: 2021-09-03

## 2021-09-03 VITALS
HEART RATE: 78 BPM | OXYGEN SATURATION: 98 % | SYSTOLIC BLOOD PRESSURE: 132 MMHG | DIASTOLIC BLOOD PRESSURE: 76 MMHG | WEIGHT: 241 LBS | BODY MASS INDEX: 32.64 KG/M2 | HEIGHT: 72 IN

## 2021-09-03 DIAGNOSIS — I65.23 BILATERAL CAROTID ARTERY STENOSIS: ICD-10-CM

## 2021-09-03 DIAGNOSIS — I65.23 BILATERAL CAROTID ARTERY STENOSIS: Primary | ICD-10-CM

## 2021-09-03 DIAGNOSIS — E78.2 MIXED HYPERLIPIDEMIA: ICD-10-CM

## 2021-09-03 DIAGNOSIS — I10 ESSENTIAL HYPERTENSION: ICD-10-CM

## 2021-09-03 LAB
ADV 40+41 DNA STL QL NAA+NON-PROBE: NOT DETECTED
ASTRO TYP 1-8 RNA STL QL NAA+NON-PROBE: NOT DETECTED
C CAYETANENSIS DNA STL QL NAA+NON-PROBE: NOT DETECTED
C COLI+JEJ+UPSA DNA STL QL NAA+NON-PROBE: NOT DETECTED
C DIFF TOX GENS STL QL NAA+PROBE: NEGATIVE
CRYPTOSP DNA STL QL NAA+NON-PROBE: NOT DETECTED
E HISTOLYT DNA STL QL NAA+NON-PROBE: NOT DETECTED
EAEC PAA PLAS AGGR+AATA ST NAA+NON-PRB: NOT DETECTED
EC STX1+STX2 GENES STL QL NAA+NON-PROBE: NOT DETECTED
EPEC EAE GENE STL QL NAA+NON-PROBE: DETECTED
ETEC LTA+ST1A+ST1B TOX ST NAA+NON-PROBE: NOT DETECTED
G LAMBLIA DNA STL QL NAA+NON-PROBE: NOT DETECTED
NOROVIRUS GI+II RNA STL QL NAA+NON-PROBE: NOT DETECTED
P SHIGELLOIDES DNA STL QL NAA+NON-PROBE: NOT DETECTED
RVA RNA STL QL NAA+NON-PROBE: NOT DETECTED
S ENT+BONG DNA STL QL NAA+NON-PROBE: NOT DETECTED
SAPO I+II+IV+V RNA STL QL NAA+NON-PROBE: NOT DETECTED
SHIGELLA SP+EIEC IPAH ST NAA+NON-PROBE: NOT DETECTED
V CHOL+PARA+VUL DNA STL QL NAA+NON-PROBE: NOT DETECTED
V CHOLERAE DNA STL QL NAA+NON-PROBE: NOT DETECTED
Y ENTEROCOL DNA STL QL NAA+NON-PROBE: NOT DETECTED

## 2021-09-03 PROCEDURE — 99214 OFFICE O/P EST MOD 30 MIN: CPT | Performed by: NURSE PRACTITIONER

## 2021-09-03 PROCEDURE — 93880 EXTRACRANIAL BILAT STUDY: CPT | Performed by: SURGERY

## 2021-09-03 PROCEDURE — 87493 C DIFF AMPLIFIED PROBE: CPT | Performed by: EMERGENCY MEDICINE

## 2021-09-03 PROCEDURE — 93880 EXTRACRANIAL BILAT STUDY: CPT

## 2021-09-03 PROCEDURE — 0097U HC BIOFIRE FILMARRAY GI PANEL: CPT | Performed by: EMERGENCY MEDICINE

## 2021-09-03 NOTE — PROGRESS NOTES
"9/3/2021       Vinayak Correia PA  [address]  [place] [address]    Carlos A Boyer Jr.  1960    Chief Complaint   Patient presents with   • Follow-up     1 Year Follow Up For Bilateral Carotid Artery Stenosis. Test [id number] US pad carotid bilateral. Patient denies any stroke like symptoms.    • Former Smoker     Patient is a Former Smoker - Quit 1993   • Med Management     Verbally verified medications with patient        Dear Vinayak Correia PA   HPI  I had the pleasure of seeing your patient Carlos A Boyer Jr. in the office today.  As you recall, Carlos A Boyer Jr. is a 61 y.o.  male who we are following for asymptomatic carotid occlusive disease.  He has a history of coronary artery bypass grafting in 2019.  He also has a history of stroke with facial drooping and left-sided weakness at that time.  You are currently following for routine health maintenance.  He has a history of coronary artery disease and is status post CABG in July 2019.  He has a history of stroke with facial droop and left sided weakness at that time.  He is maintained on aspirin, Eliquis, and Crestor.  He does follow with Dr. Tang for a 4.4 cm ascending aortic aneurysm.  He denies any strokelike symptoms.  He did have noninvasive testing performed today, which I did review in office.    Review of Systems   Constitutional: Negative.    HENT: Negative.    Eyes: Negative.    Respiratory: Negative.    Cardiovascular: Negative.    Gastrointestinal: Negative.    Endocrine: Negative.    Genitourinary: Negative.    Musculoskeletal: Positive for arthralgias.   Skin: Negative.    Allergic/Immunologic: Negative.    Neurological: Negative.    Hematological: Negative.    Psychiatric/Behavioral: Negative.    All other systems reviewed and are negative.       /76 (BP Location: Right arm, Patient Position: Sitting, Cuff Size: Adult)   Pulse 78   Ht 182.9 cm (72\")   Wt 109 kg (241 lb)   SpO2 98%   BMI 32.69 kg/m²  "   Physical Exam  Vitals and nursing note reviewed.   Constitutional:       General: He is not in acute distress.     Appearance: Normal appearance. He is well-developed. He is obese. He is not diaphoretic.   HENT:      Head: Normocephalic and atraumatic.   Neck:      Vascular: No carotid bruit or JVD.   Cardiovascular:      Rate and Rhythm: Normal rate and regular rhythm.      Pulses: Normal pulses.           Femoral pulses are 2+ on the right side and 2+ on the left side.       Popliteal pulses are 2+ on the right side and 2+ on the left side.        Dorsalis pedis pulses are 2+ on the right side and 2+ on the left side.        Posterior tibial pulses are 2+ on the right side and 2+ on the left side.      Heart sounds: Normal heart sounds, S1 normal and S2 normal. No murmur heard.   No friction rub. No gallop.    Pulmonary:      Effort: Pulmonary effort is normal.      Breath sounds: Normal breath sounds.   Abdominal:      General: Bowel sounds are normal. There is no abdominal bruit.      Palpations: Abdomen is soft.      Tenderness: There is no abdominal tenderness.   Musculoskeletal:         General: Normal range of motion.      Cervical back: Neck supple.   Skin:     General: Skin is warm and dry.   Neurological:      General: No focal deficit present.      Mental Status: He is alert and oriented to person, place, and time.      Cranial Nerves: No cranial nerve deficit.   Psychiatric:         Mood and Affect: Mood normal.         Behavior: Behavior normal.         Thought Content: Thought content normal.         Judgment: Judgment normal.         Diagnostic data:  Noninvasive testing including carotid duplex shows less than 50% carotid stenosis bilaterally with bilateral antegrade vertebral flow.       Patient Active Problem List   Diagnosis   • Type 2 diabetes mellitus without complication, with long-term current use of insulin (CMS/[facility])   • Gastroesophageal reflux disease without esophagitis   • Essential  hypertension   • Seizure disorder (CMS/HCC)   • Carotid disease, bilateral (CMS/HCC)   • Coronary artery disease involving coronary bypass graft of native heart with unstable angina pectoris (CMS/HCC)   • Mixed hyperlipidemia   • Chronic left arterial ischemic stroke, ICA (internal carotid artery)   • HOA on CPAP   • Benign non-nodular prostatic hyperplasia with lower urinary tract symptoms   • Deviated nasal septum   • Maribell bullosa   • Hypertrophy of both inferior nasal turbinates   • Chronic sinusitis of both maxillary sinuses   • S/P FESS (functional endoscopic sinus surgery)   • Diabetic complication (CMS/HCC)   • Epigastric pain   • Diabetic peripheral neuropathy (CMS/HCC)   • Abnormal stress test   • Stenosis of left carotid artery   • Class 1 obesity in adult   • 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CMS/HCC)   • History of seizure   • Lung nodules   • Cardiac murmur   • Thoracic aortic aneurysm without rupture (CMS/HCC)   • Paroxysmal atrial fibrillation (CMS/HCC)   • Atrial flutter (CMS/HCC) with cardioversion on Sept 5, 2019   • Acute diastolic congestive heart failure (CMS/HCC)   • Fluid overload   • Pleural effusion   • History of hyperlipidemia   • Respiratory distress   • Long-term use of high-risk medication   • Dysphagia   • Left lower quadrant abdominal pain   • COVID-19 virus detected   • Shortness of breath   • S/P CABG (coronary artery bypass graft)   • Chronic anticoagulation   • Atherosclerosis of CABG w unstable angina pectoris (CMS/HCC)        Diagnosis Plan   1. Bilateral carotid artery stenosis  US Carotid Bilateral   2. Essential hypertension     3. Mixed hyperlipidemia         Plan: After thoroughly evaluating Carlos A Boyer Jr., I believe the best course of action is to remain conservative from vascular surgery standpoint.  Currently he is doing well denies any strokelike symptoms.  I did review his testing which remained stable with less than 50% carotid stenosis bilaterally.  I  will see him back in 1 year with repeat noninvasive testing for continued surveillance, including a carotid duplex.  I did discuss vascular risk factors as they pertain to the progression of vascular disease including controlling his hypertension, hyperlipidemia, and diabetes mellitus.  His blood pressure is stable on his current medication regimen.  He is maintained on Lipitor for his hyperlipidemia. Patient's Body mass index is 32.69 kg/m². indicating that he is obese (BMI >30). Obesity-related health conditions include the following: hypertension, coronary heart disease, diabetes mellitus and dyslipidemias. Obesity is unchanged. BMI is is above average; BMI management plan is completed. We discussed portion control and increasing exercise.. The patient is to continue taking their medications as previously discussed.   This was all discussed in full with complete understanding.  Thank you for allowing me to participate in the care of your patient.  Please do not hesitate to call with any questions or concerns.  We will keep you aware of any further encounters with Carlos A Boyer Jr..        Sincerely yours,         Arabella Yanes, Vinayak Ayala PA

## 2021-09-04 LAB
QT INTERVAL: 384 MS
QTC INTERVAL: 448 MS

## 2021-11-15 DIAGNOSIS — G63 POLYNEUROPATHY ASSOCIATED WITH UNDERLYING DISEASE (HCC): ICD-10-CM

## 2021-11-15 RX ORDER — GABAPENTIN 300 MG/1
300 CAPSULE ORAL NIGHTLY
Qty: 90 CAPSULE | Refills: 0 | Status: SHIPPED | OUTPATIENT
Start: 2021-11-15 | End: 2022-07-11 | Stop reason: SDUPTHER

## 2021-11-15 NOTE — TELEPHONE ENCOUNTER
Caller: Carlos A Boyer Jr.    Relationship: Self      Requested Prescriptions:   Requested Prescriptions     Pending Prescriptions Disp Refills   • gabapentin (Neurontin) 300 MG capsule 90 capsule 0     Sig: Take 1 capsule by mouth Every Night.        Pharmacy where request should be sent:  [facility]    Additional details provided by patient: [facility].     Does the patient have less than a 3 day supply:  [] Yes  [x] No

## 2021-11-20 ENCOUNTER — HOSPITAL ENCOUNTER (EMERGENCY)
Facility: HOSPITAL | Age: 61
Discharge: HOME | End: 2021-11-20
Attending: EMERGENCY MEDICINE | Admitting: EMERGENCY MEDICINE

## 2021-11-20 ENCOUNTER — APPOINTMENT (OUTPATIENT)
Dept: CT IMAGING | Facility: HOSPITAL | Age: 61
End: 2021-11-20

## 2021-11-20 VITALS
HEIGHT: 72 IN | OXYGEN SATURATION: 97 % | BODY MASS INDEX: 33.18 KG/M2 | RESPIRATION RATE: 18 BRPM | DIASTOLIC BLOOD PRESSURE: 79 MMHG | WEIGHT: 245 LBS | HEART RATE: 81 BPM | TEMPERATURE: 97.8 F | SYSTOLIC BLOOD PRESSURE: 138 MMHG

## 2021-11-20 DIAGNOSIS — K57.92 ACUTE DIVERTICULITIS: Primary | ICD-10-CM

## 2021-11-20 DIAGNOSIS — R30.0 DYSURIA: ICD-10-CM

## 2021-11-20 DIAGNOSIS — R10.9 ACUTE LEFT FLANK PAIN: ICD-10-CM

## 2021-11-20 LAB
ALBUMIN SERPL-MCNC: 4.3 G/DL (ref 3.5–5.2)
ALBUMIN/GLOB SERPL: 1.5 G/DL
ALP SERPL-CCNC: 98 U/L (ref 39–117)
ALT SERPL W P-5'-P-CCNC: 24 U/L (ref 1–41)
ANION GAP SERPL CALCULATED.3IONS-SCNC: 13 MMOL/L (ref 5–15)
AST SERPL-CCNC: 19 U/L (ref 1–40)
BASOPHILS # BLD AUTO: 0.02 10*3/MM3 (ref 0–0.2)
BASOPHILS NFR BLD AUTO: 0.3 % (ref 0–1.5)
BILIRUB SERPL-MCNC: 0.7 MG/DL (ref 0–1.2)
BILIRUB UR QL STRIP: NEGATIVE
BUN SERPL-MCNC: 16 MG/DL (ref 8–23)
BUN/CREAT SERPL: 16 (ref 7–25)
CALCIUM SPEC-SCNC: 9 MG/DL (ref 8.6–10.5)
CHLORIDE SERPL-SCNC: 104 MMOL/L (ref 98–107)
CLARITY UR: CLEAR
CO2 SERPL-SCNC: 21 MMOL/L (ref 22–29)
COLOR UR: YELLOW
CREAT SERPL-MCNC: 1 MG/DL (ref 0.76–1.27)
DEPRECATED RDW RBC AUTO: 43.6 FL (ref 37–54)
EOSINOPHIL # BLD AUTO: 0.1 10*3/MM3 (ref 0–0.4)
EOSINOPHIL NFR BLD AUTO: 1.6 % (ref 0.3–6.2)
ERYTHROCYTE [DISTWIDTH] IN BLOOD BY AUTOMATED COUNT: 13.2 % (ref 12.3–15.4)
GFR SERPL CREATININE-BSD FRML MDRD: 75 ML/MIN/1.73
GLOBULIN UR ELPH-MCNC: 2.8 GM/DL
GLUCOSE SERPL-MCNC: 289 MG/DL (ref 65–99)
GLUCOSE UR STRIP-MCNC: ABNORMAL MG/DL
HCT VFR BLD AUTO: 40.8 % (ref 37.5–51)
HGB BLD-MCNC: 13.1 G/DL (ref 13–17.7)
HGB UR QL STRIP.AUTO: NEGATIVE
IMM GRANULOCYTES # BLD AUTO: 0.02 10*3/MM3 (ref 0–0.05)
IMM GRANULOCYTES NFR BLD AUTO: 0.3 % (ref 0–0.5)
KETONES UR QL STRIP: NEGATIVE
LEUKOCYTE ESTERASE UR QL STRIP.AUTO: NEGATIVE
LYMPHOCYTES # BLD AUTO: 1.63 10*3/MM3 (ref 0.7–3.1)
LYMPHOCYTES NFR BLD AUTO: 26.1 % (ref 19.6–45.3)
MCH RBC QN AUTO: 29.1 PG (ref 26.6–33)
MCHC RBC AUTO-ENTMCNC: 32.1 G/DL (ref 31.5–35.7)
MCV RBC AUTO: 90.7 FL (ref 79–97)
MONOCYTES # BLD AUTO: 0.56 10*3/MM3 (ref 0.1–0.9)
MONOCYTES NFR BLD AUTO: 9 % (ref 5–12)
NEUTROPHILS NFR BLD AUTO: 3.91 10*3/MM3 (ref 1.7–7)
NEUTROPHILS NFR BLD AUTO: 62.7 % (ref 42.7–76)
NITRITE UR QL STRIP: NEGATIVE
NRBC BLD AUTO-RTO: 0 /100 WBC (ref 0–0.2)
PH UR STRIP.AUTO: 6 [PH] (ref 5–8)
PLATELET # BLD AUTO: 118 10*3/MM3 (ref 140–450)
PMV BLD AUTO: 11.3 FL (ref 6–12)
POTASSIUM SERPL-SCNC: 4.4 MMOL/L (ref 3.5–5.2)
PROT SERPL-MCNC: 7.1 G/DL (ref 6–8.5)
PROT UR QL STRIP: NEGATIVE
RBC # BLD AUTO: 4.5 10*6/MM3 (ref 4.14–5.8)
SODIUM SERPL-SCNC: 138 MMOL/L (ref 136–145)
SP GR UR STRIP: >1.03 (ref 1–1.03)
UROBILINOGEN UR QL STRIP: ABNORMAL
WBC NRBC COR # BLD: 6.24 10*3/MM3 (ref 3.4–10.8)

## 2021-11-20 PROCEDURE — 25010000002 ONDANSETRON PER 1 MG: Performed by: EMERGENCY MEDICINE

## 2021-11-20 PROCEDURE — 25010000002 MORPHINE PER 10 MG: Performed by: EMERGENCY MEDICINE

## 2021-11-20 PROCEDURE — 85025 COMPLETE CBC W/AUTO DIFF WBC: CPT | Performed by: EMERGENCY MEDICINE

## 2021-11-20 PROCEDURE — 81003 URINALYSIS AUTO W/O SCOPE: CPT | Performed by: EMERGENCY MEDICINE

## 2021-11-20 PROCEDURE — 96375 TX/PRO/DX INJ NEW DRUG ADDON: CPT

## 2021-11-20 PROCEDURE — 36415 COLL VENOUS BLD VENIPUNCTURE: CPT

## 2021-11-20 PROCEDURE — 74176 CT ABD & PELVIS W/O CONTRAST: CPT

## 2021-11-20 PROCEDURE — 96374 THER/PROPH/DIAG INJ IV PUSH: CPT

## 2021-11-20 PROCEDURE — 99283 EMERGENCY DEPT VISIT LOW MDM: CPT

## 2021-11-20 PROCEDURE — 80053 COMPREHEN METABOLIC PANEL: CPT | Performed by: EMERGENCY MEDICINE

## 2021-11-20 RX ORDER — SODIUM CHLORIDE 0.9 % (FLUSH) 0.9 %
10 SYRINGE (ML) INJECTION AS NEEDED
Status: DISCONTINUED | OUTPATIENT
Start: 2021-11-20 | End: 2021-11-20 | Stop reason: HOSPADM

## 2021-11-20 RX ORDER — AMOXICILLIN AND CLAVULANATE POTASSIUM 875; 125 MG/1; MG/1
1 TABLET, FILM COATED ORAL 2 TIMES DAILY
Qty: 14 TABLET | Refills: 0 | Status: SHIPPED | OUTPATIENT
Start: 2021-11-20 | End: 2021-11-27

## 2021-11-20 RX ORDER — ONDANSETRON 2 MG/ML
4 INJECTION INTRAMUSCULAR; INTRAVENOUS ONCE
Status: COMPLETED | OUTPATIENT
Start: 2021-11-20 | End: 2021-11-20

## 2021-11-20 RX ADMIN — MORPHINE SULFATE 4 MG: 4 INJECTION, SOLUTION INTRAMUSCULAR; INTRAVENOUS at 10:32

## 2021-11-20 RX ADMIN — SODIUM CHLORIDE, POTASSIUM CHLORIDE, SODIUM LACTATE AND CALCIUM CHLORIDE 1000 ML: 600; 310; 30; 20 INJECTION, SOLUTION INTRAVENOUS at 10:32

## 2021-11-20 RX ADMIN — ONDANSETRON 4 MG: 2 INJECTION INTRAMUSCULAR; INTRAVENOUS at 10:32

## 2021-11-20 NOTE — ED NOTES
The following fall interventions were initiated:    [] Patient and/or family given education   [x] Call light within reach and educated on how to use   [x] Bed rails up per protocol    [x] Bed locked and in the lowest position   [] Bed alarm set and on loudest setting   [x] Fall wrist band applied   [] Non skid footwear applied   [x] Room free of clutter   [x] Patient items within reach   [x] Adequate lighting provided  [x] Falls sign present    [] Patient moved closer to nursing station   [] Restraints applied        Osiris Martinez, RN  11/20/21 1016

## 2021-11-20 NOTE — ED PROVIDER NOTES
Emergency Medicine Provider Note    Subjective:    HISTORY OF PRESENT ILLNESS     This is a very pleasant 61 y.o. male with a past medical history of arthritis, coronary artery disease who presents to the emergency department today with a chief complaint of left flank pain.  Patient states he developed left flank pain 2 days ago.  Since then it is radiated to his abdomen.  It is constant.  Moderate.  Dull.  No exacerbating relieving factors.  No associated symptoms.  Initially had some urinary symptoms but denies them now.  He has no numbness, weakness, or paresthesias.  No fevers or chills.  No nausea or vomiting.          History is obtained from the patient.       Review of Systems: All other systems are reviewed and are negative other than noted in the HPI.    Past Medical History:  Past Medical History:   Diagnosis Date   • Arthritis    • Asthma    • Cancer (HCC)    • Carotid disease, bilateral (HCC)    • Chest pain    • Chronic ischemic heart disease    • Chronic sinusitis    • Maribell bullosa    • Coronary artery disease    • Deviated septum    • Diabetes mellitus (HCC)    • Difficulty urinating    • Diverticulitis    • Enlarged prostate    • Fatty liver    • GERD (gastroesophageal reflux disease)    • Heart disease    • Hyperlipidemia    • Hypertension    • Hypertrophy of nasal turbinates    • Keratoderma    • Kidney stone    • Migraine    • Murmur, heart    • Myocardial infarction (HCC)    • Obesity    • PONV (postoperative nausea and vomiting)    • Psoriasis    • Seizures (HCC)    • Sinus congestion    • Skin cancer    • Sleep apnea    • SOB (shortness of breath)    • Stroke (HCC)    • UTI (urinary tract infection)        Allergies:  Allergies   Allergen Reactions   • Flagyl [Metronidazole] Hives   • Atorvastatin Other (See Comments)     Muscle cramps   • Ciprofloxacin Hives       Past Surgical History:  Past Surgical History:   Procedure Laterality Date   • CARDIAC CATHETERIZATION  01/2016      Broadbent; widely patent previously placed stents in the left anterior descending and obstructive disease involving the diagonal branch which was treated medically   • CARDIAC CATHETERIZATION N/A 7/14/2017    Procedure: Left Heart Cath;  Surgeon: Wade Ramey MD;  Location: [facility] CATH INVASIVE LOCATION;  Service:    • CARDIAC CATHETERIZATION Left 10/15/2018    Procedure: Cardiac Catheterization/Vascular Study;  Surgeon: Wade Ramey MD;  Location: [facility] CATH INVASIVE LOCATION;  Service: Cardiology   • CARDIAC CATHETERIZATION  10/15/2018    Procedure: Functional Flow [place];  Surgeon: Wade Ramey MD;  Location:  [facility] CATH INVASIVE LOCATION;  Service: Cardiology   • CARDIAC CATHETERIZATION N/A 10/15/2018    Procedure: Left ventriculography;  Surgeon: Wade Ramey MD;  Location: [facility] CATH INVASIVE LOCATION;  Service: Cardiology   • CARDIAC CATHETERIZATION Left 6/26/2019    Procedure: Cardiac Catheterization/Vascular Study VEL OK  HE WILL WAIT 1 YEAR FOR SHOULDER SURGERY ;  Surgeon: Wade Ramey MD;  Location: [facility] CATH INVASIVE LOCATION;  Service: Cardiology   • CARDIAC CATHETERIZATION Left 4/30/2021    Procedure: Coronary angiography;  Surgeon: Sahil Llamas MD;  Location: [facility] CATH INVASIVE LOCATION;  Service: Cardiology;  Laterality: Left;   • CARDIAC CATHETERIZATION N/A 4/30/2021    Procedure: Percutaneous Coronary Intervention;  Surgeon: Sahil Llamas MD;  Location: [facility] CATH INVASIVE LOCATION;  Service: Cardiology;  Laterality: N/A;   • CHOLECYSTECTOMY     • CHOLECYSTECTOMY WITH INTRAOPERATIVE CHOLANGIOGRAM N/A 8/1/2018    Procedure: CHOLECYSTECTOMY LAPAROSCOPIC INTRAOPERATIVE CHOLANGIOGRAM;  Surgeon: Shane Ann MD;  Location: [facility] OR;  Service: General   • COLONOSCOPY N/A 7/14/2020    Procedure: COLONOSCOPY WITH ANESTHESIA;  Surgeon: Anupam Morales DO;  Location: [facility] ENDOSCOPY;  Service: Gastroenterology;  Laterality: N/A;  pre: abdominal pain  post: diverticulosis  Bren  GINA Perry   • CORONARY ANGIOPLASTY     • CORONARY ARTERY BYPASS GRAFT N/A 7/6/2019    Procedure: CABG X2 WITH LIMA, LEFT LEG OVH, AND PLACEMENT OF LEFT FEMORAL ARTERIAL LINE;  Surgeon: Steven Tang MD;  Location: [facility] OR;  Service: Cardiothoracic   • CORONARY STENT PLACEMENT      x 6   • ENDOSCOPIC FUNCTIONAL SINUS SURGERY (FESS) Bilateral 12/13/2017    Procedure: PROCEDURE PERFORMED:  Bilateral functional endoscopic anterior ethmoidectomy with bilateral middle meatal antrostomy Septoplasty Right kathia bullosa resection Bilateral inferior turbinate reduction via Coblation;  Surgeon: Maaynk Ibarra MD;  Location: [facility] OR;  Service:    • ENDOSCOPY N/A 7/30/2018    Procedure: ESOPHAGOGASTRODUODENOSCOPY WITH ANESTHESIA;  Surgeon: Benitez Mas MD;  Location: [facility] ENDOSCOPY;  Service: Gastroenterology   • ENDOSCOPY N/A 7/14/2020    Procedure: ESOPHAGOGASTRODUODENOSCOPY WITH ANESTHESIA;  Surgeon: Anupam Morales DO;  Location: [facility] ENDOSCOPY;  Service: Gastroenterology;  Laterality: N/A;  pre: abdominal pain  post: esophagitis  Vinayak Correia PA   • HERNIA REPAIR      x2 inguinal area   • KIDNEY STONE SURGERY     • KNEE SURGERY Right    • OTHER SURGICAL HISTORY      urolift   • PROSTATE SURGERY      Dr. Badillo - 2017   • ROTATOR CUFF REPAIR     • THUMB AMPUTATION Left     partial   • TOE AMPUTATION Right     big       Family History:  Family History   Problem Relation Age of Onset   • Heart disease Father    • COPD Mother    • Hypertension Mother    • Asthma Mother    • No Known Problems Sister    • Colon cancer Paternal Uncle    • Prostate cancer Maternal Grandfather    • No Known Problems Sister    • Colon cancer Maternal Grandmother    • Colon polyps Maternal Grandmother        Social History:  Social History     Socioeconomic History   • Marital status:    Tobacco Use   • Smoking status: Former Smoker     Packs/day: 1.50     Years: 4.50     Pack years: 6.75     Types:  Cigarettes, Cigars     Quit date:      Years since quittin.9   • Smokeless tobacco: Former User     Types: Chew     Quit date:    Vaping Use   • Vaping Use: Never used   Substance and Sexual Activity   • Alcohol use: No   • Drug use: No   • Sexual activity: Defer       Home Medications:  Prior to Admission medications    Medication Sig Start Date End Date Taking? Authorizing Provider   albuterol (PROVENTIL HFA;VENTOLIN HFA) 108 (90 BASE) MCG/ACT inhaler Inhale 2 puffs Every 6 (Six) Hours As Needed for wheezing.    Rohan Christina MD   apixaban (ELIQUIS) 5 MG tablet tablet Take 1 tablet by mouth Every 12 (Twelve) Hours. 20   Wade Ramey MD   aspirin 81 MG chewable tablet Chew 81 mg Daily.    Rohan Christina MD   calcium polycarbophil (FIBERCON) 625 MG tablet Take 625 mg by mouth As Needed.    Rohan Christina MD   carboxymethylcellulose (REFRESH PLUS) 0.5 % solution Administer 1 drop to both eyes 4 (Four) Times a Day As Needed for Dry Eyes.    Rohan Christina MD   dicyclomine (BENTYL) 20 MG tablet Take 1 tablet by mouth Every 6 (Six) Hours. 21   Dewayne Kenny MD   Empagliflozin (JARDIANCE) 10 MG tablet Take 2 tablets by mouth Daily. 20 mg total    Rohan Christina MD   fenofibrate (Tricor) 145 MG tablet Take 1 tablet by mouth Daily. 21   Gurinder Beckman MD   fluticasone (FLONASE) 50 MCG/ACT nasal spray 2 sprays into each nostril Daily.    Rohan Christina MD   furosemide (LASIX) 40 MG tablet TAKE 1 TABLET BY MOUTH ONCE DAILY AS NEEDED FOR WEIGHT GAIN GREATER THAN 2 LBS IN A DAY OR INCREASING CHEST CONGESTION 19   Rohan Christina MD   gabapentin (Neurontin) 300 MG capsule Take 1 capsule by mouth Every Night. 11/15/21   Monty Monreal PA-C   insulin aspart (novoLOG FLEXPEN) 100 UNIT/ML solution pen-injector sc pen Inject 40 Units under the skin into the appropriate area as directed Every Morning. 40 units 3 times daily    [place]  MD Rohan   insulin detemir (LEVEMIR) 100 UNIT/ML injection Inject 50 Units under the skin into the appropriate area as directed Daily. 100 units twice daily    Emergency, Nurse Epic, RN   isosorbide mononitrate (IMDUR) 60 MG 24 hr tablet Take 1 tablet by mouth Daily. 7/12/21   Wade Ramey MD   lamoTRIgine (LaMICtal) 200 MG tablet Take 1 tablet by mouth 2 (Two) Times a Day. 7/26/21   Monty Monreal PA-C   levETIRAcetam (KEPPRA) 500 MG tablet Take 3 tablets every morning, take 4 tablets every night. 7/26/21   Monty Monreal PA-C   Liraglutide (VICTOZA SC) Inject 1.2 mg under the skin into the appropriate area as directed Every Night.    Rohan Christina MD   lisinopril (PRINIVIL,ZESTRIL) 20 MG tablet Take 2 tablets by mouth Daily. 4/30/21   Gurinder Beckman MD   metFORMIN (GLUCOPHAGE) 1000 MG tablet Take 1 tablet by mouth 2 (Two) Times a Day With Meals. HOLD x 48 hours post contrast. May resume 3/18/18 3/16/18   Meghan Salazar APRN   Multiple Vitamin (MULTI VITAMIN PO) Take 1 tablet by mouth Daily.    Rohan Christina MD   nitroglycerin (NITROSTAT) 0.4 MG SL tablet Place 0.4 mg under the tongue Every 5 (Five) Minutes As Needed for chest pain. Take no more than 3 doses in 15 minutes.    Rohan Christina MD   ondansetron ODT (ZOFRAN-ODT) 8 MG disintegrating tablet Place 1 tablet on the tongue Every 8 (Eight) Hours As Needed for Nausea or Vomiting. 9/2/21   Dewayne Kenny MD   pantoprazole (PROTONIX) 40 MG EC tablet Take 40 mg by mouth 2 (Two) Times a Day.    Rohan Christina MD   psyllium (METAMUCIL) 58.6 % packet Take 1 packet by mouth Daily As Needed (constipation).    Rohan Christina MD   rosuvastatin (CRESTOR) 20 MG tablet Take 20 mg by mouth Every Night.    Rohan Christina MD         Objective:    PHYSICAL EXAM     Vitals:   Vitals:    11/20/21 1141   BP: 138/79   Pulse: 81   Resp: 18   Temp: 97.8 °F (36.6 °C)   SpO2: 97%     GENERAL: Well appearing, in  no acute distress.   HEENT: Moist mucous membranes, oropharynx clear without lesions, exudates, thrush.   EYES: No scleral icterus, conjunctivae clear.   NECK: No cervical lymphadenopathy, no stiffness.  CARDIAC: Normal rate, regular rhythm, no murmurs, 2+ peripheral pulses in all four extremities, normal capillary refill.   PULMONARY: Normal work of breathing on room air, lungs are clear to auscultation bilaterally without wheezes, crackles, rhonchi.  ABDOMINAL: Normal bowel sounds, abdomen is soft, tender to palpation along the left abdomen with no rebound, no guarding, no tenderness elsewhere non-distended, no hepatomegaly or splenomegaly.   MUSCULOSKELETAL: Normal range of motion, no lower extremity edema.  NEUROLOGIC: Alert and oriented x 3, EOM grossly intact and moves all four extremities with normal strength.  SKIN: Warm and dry without rashes.   PSYCHIATRIC: Mood and affect are normal.     PROCEDURES     Procedures    LAB AND RADIOLOGY RESULTS     Lab Results (last 24 hours)     ** No results found for the last 24 hours. **          CT Abdomen Pelvis Without Contrast    Result Date: 11/20/2021  Narrative: CT ABDOMEN PELVIS WO CONTRAST- 11/20/2021 10:42 AM CST  HISTORY: Flank pain, kidney stone suspected; R10.9-Unspecified abdominal pain; R30.0-Dysuria  COMPARISON: 9/2/2021  DLP: 736 mGy cm. Automated exposure control was utilized to diminish patient radiation dose.  TECHNIQUE: Noncontrast enhanced images of the abdomen and pelvis obtained without oral contrast.  FINDINGS: Right middle lobe and bilateral lower lobe subsegmental atelectasis is present. There is mild cardiomegaly with coronary calcifications. No evidence of pericardial effusion..  LIVER: No focal liver lesion. There is diffuse steatosis of the liver.  BILIARY SYSTEM: The gallbladder is unremarkable. No intrahepatic or extrahepatic ductal dilatation.  PANCREAS: No focal pancreatic lesion.  SPLEEN: Unremarkable.  KIDNEYS AND ADRENALS: The  adrenals are unremarkable. Nodes of discrete renal mass. Bilateral nonobstructing nephrolithiasis is present..  The ureters are decompressed and normal in appearance.  RETROPERITONEUM: No mass, lymphadenopathy or hemorrhage.  GI TRACT: There is mild constipation. There is increased stool throughout the colon. Scattered diverticula are noted throughout the colon with acute diverticulitis noted involving the proximal sigmoid colon with inflammatory stranding noted along the lateral margin of the sigmoid colon. There is mild associated thickening within the lower left paracolic gutter. No evidence of extraluminal air or diverticular abscess.. The appendix is visualized and unremarkable.  OTHER: There is no mesenteric mass, lymphadenopathy or fluid collection. The osseous structures and soft tissues demonstrate no worrisome lesions. No free fluid is present.  PELVIS: No mass lesion, fluid collection or significant lymphadenopathy is seen in the pelvis. The urinary bladder is normal in appearance.      Impression: 1. Acute diverticulitis involving the proximal sigmoid colon with adjacent inflammatory change within the left pelvis and lower left paracolic gutter. No evidence of extraluminal air or diverticular abscess. 2. Mild constipation. Diverticulosis is noted scattered throughout the colon. The appendix is normal in appearance. 3. Small nonobstructing calculi of the upper tracts of both kidneys. No evidence of ureteral stone or obstructive uropathy. 4. Steatosis of the liver. 5. Status post cholecystectomy. No evidence of biliary dilatation..   This report was finalized on 11/20/2021 11:18 by Dr. Dwayne Briceno MD.      ED course:    Medications   lactated ringers bolus 1,000 mL (0 mL Intravenous Stopped 11/20/21 1141)   morphine injection 4 mg (4 mg Intravenous Given 11/20/21 1032)   ondansetron (ZOFRAN) injection 4 mg (4 mg Intravenous Given 11/20/21 1032)          Amount and/or complexity of data  reviewed:    • Clinical lab tests ordered and reviewed.  • Tests in the radiology section ordered and reviewed.        Risk of significant complications, morbidity, and/or mortality.    •  Presenting problem: high  •  Diagnostic procedures: high  •  Management options: high        MEDICAL DECISION MAKING     Patient presents with left flank and abdominal pain. Upon arrival to the Emergency Department the patient is in no acute distress and his vital signs are reassuring.  IV access is obtained and labs are sent.  CBC is unremarkable with the exception of mild thrombocytopenia, CMP shows hyperglycemia but no elevation anion gap, no signs of kidney injury, no abnormal LFTs, no abnormal electrolytes, urinalysis shows no acute findings.CT scan the abdomen pelvis without contrast is obtained to evaluate for stones but it actually shows acute diverticulitis with adjacent inflammatory changes.  No evidence of abscess.  He has small nonobstructing renal calculi but no obstructive uropathy.  No signs of infected stone on history lab work.  He feels better after medications here.  He has no fevers.  He is not tachycardic.  He is not hypotensive.  No signs of perforation on examination.  He has a reassuring abdominal examination.  I feel he is a good candidate for outpatient treatment.  I will prescribe him Augmentin.  I have discussed this with him and he is comfortable with this plan.  I explained that it could get worse and he verbalizes understanding of this and states he will return for any new or worsening symptoms.  He has no other signs of acute intra-abdominal pathology.  He is discharged in good condition with reassuring vital signs and he is given commonsense return precautions which he verbalizes understanding of.        Diagnosis:    Final diagnoses:   Acute left flank pain   Dysuria   Acute diverticulitis         ED Disposition:     ED Disposition     ED Disposition Condition Comment    Discharge Stable            Vinayak Correia, PA  [address]  [place] [address]  [phone]    Schedule an appointment as soon as possible for a visit in 2 days           Medication List      New Prescriptions    amoxicillin-clavulanate 875-125 MG per tablet  Commonly known as: AUGMENTIN  Take 1 tablet by mouth 2 (Two) Times a Day for 7 days.           Where to Get Your Medications      You can get these medications from any pharmacy    Bring a paper prescription for each of these medications  · amoxicillin-clavulanate 875-125 MG per tablet            Greg Miller MD  11/22/21 1715

## 2021-11-22 NOTE — ED NOTES
Hi is patients name available?     Yes- Hey! My name is ______ with the patient relations/ER department at [facility]. I understand you were in the emergency room yesterday and Dr. Physicians name really cares about his/her patients and requested for me to follow up with you.  There are a few questions our physicians and nurses want to know about your experience.     • Do you mind if I ask them? Yes [] No []  Reasoning:       • Did your nurse introduce himself/herself when they first met you? Yes [x] No []      • Did you physician or PA/APRN introduce himself/herself? Yes [x] No []      • Did our staff treat you with compassion? Yes [x] No []      • If we did any labs or imaging, did we talk about how long it would take to get results? Yes [x] No []      • Were we respectful to you and anyone who was with you? Yes [x] No []      • Did we express care/concern about what brought you into the department? Yes [x] No []      • Is there anyone I can recognize for going above and beyond in their care for you? Yes [] No [x]         [if conversation has been generally positive] --- Thank you for taking the time to take this call. We really do appreciate you. You may get a patient survey in the mail. Our leaders review every single one of them, so if you get one, we hope you'll fill it out for us! We hope you have a wonderful rest of your day.     [if conversation has been negative or patient wants to file formal grievance] I am sorry to hear things didn't go so well for you. We appreciate hearing all feedback. From what I've heard today, I'd like to do some additional follow up with our ER leaders. We don't want other patients to have an experience like yours. Do you mind if I share this with our leadership team?      [if no] Okay. If you change your mind, please call me back. Again, I'm sorry your experience wasn't good. I hope you have a good rest of your day.      [if yes] Thank you for that. I'll follow  back up with you after I've had time to review with the right people. We hope you have a good rest of your day.        Charissa Munson  11/22/21 1002

## 2021-11-29 ENCOUNTER — OFFICE VISIT (OUTPATIENT)
Dept: NEUROLOGY | Facility: CLINIC | Age: 61
End: 2021-11-29

## 2021-11-29 VITALS
HEIGHT: 72 IN | BODY MASS INDEX: 34.13 KG/M2 | RESPIRATION RATE: 18 BRPM | HEART RATE: 72 BPM | DIASTOLIC BLOOD PRESSURE: 80 MMHG | SYSTOLIC BLOOD PRESSURE: 130 MMHG | WEIGHT: 252 LBS

## 2021-11-29 DIAGNOSIS — G63 POLYNEUROPATHY ASSOCIATED WITH UNDERLYING DISEASE (HCC): ICD-10-CM

## 2021-11-29 DIAGNOSIS — G40.909 SEIZURE DISORDER (HCC): Primary | ICD-10-CM

## 2021-11-29 PROCEDURE — 99214 OFFICE O/P EST MOD 30 MIN: CPT | Performed by: NURSE PRACTITIONER

## 2021-11-29 RX ORDER — LEVETIRACETAM 500 MG/1
TABLET ORAL
Qty: 630 TABLET | Refills: 1 | Status: SHIPPED | OUTPATIENT
Start: 2021-11-29 | End: 2022-09-09 | Stop reason: SDUPTHER

## 2021-11-29 RX ORDER — LAMOTRIGINE 200 MG/1
200 TABLET ORAL 2 TIMES DAILY
Qty: 180 TABLET | Refills: 1 | Status: SHIPPED | OUTPATIENT
Start: 2021-11-29 | End: 2022-08-23 | Stop reason: SDUPTHER

## 2022-01-10 PROBLEM — E78.5 HYPERLIPIDEMIA LDL GOAL <70: Status: ACTIVE | Noted: 2017-02-02

## 2022-01-10 PROBLEM — I50.32 CHRONIC DIASTOLIC CONGESTIVE HEART FAILURE: Status: ACTIVE | Noted: 2022-01-10

## 2022-01-10 PROBLEM — G47.33 OSA ON CPAP: Chronic | Status: ACTIVE | Noted: 2017-05-19

## 2022-01-10 PROBLEM — Z99.89 OSA ON CPAP: Chronic | Status: ACTIVE | Noted: 2017-05-19

## 2022-01-10 PROBLEM — I48.0 PAROXYSMAL ATRIAL FIBRILLATION (HCC): Chronic | Status: ACTIVE | Noted: 2019-07-11

## 2022-01-10 PROBLEM — E66.09 CLASS 1 OBESITY DUE TO EXCESS CALORIES WITH SERIOUS COMORBIDITY AND BODY MASS INDEX (BMI) OF 34.0 TO 34.9 IN ADULT: Status: ACTIVE | Noted: 2019-07-02

## 2022-01-10 PROBLEM — I25.110 CORONARY ARTERY DISEASE INVOLVING NATIVE CORONARY ARTERY OF NATIVE HEART WITH UNSTABLE ANGINA PECTORIS (HCC): Chronic | Status: ACTIVE | Noted: 2017-01-17

## 2022-01-10 PROBLEM — I50.32 CHRONIC DIASTOLIC CONGESTIVE HEART FAILURE (HCC): Status: ACTIVE | Noted: 2019-09-07

## 2022-01-10 PROBLEM — Z79.01 CHRONIC ANTICOAGULATION: Chronic | Status: ACTIVE | Noted: 2021-04-29

## 2022-01-10 PROBLEM — I25.110 CORONARY ARTERY DISEASE INVOLVING NATIVE CORONARY ARTERY OF NATIVE HEART WITH UNSTABLE ANGINA PECTORIS (HCC): Status: ACTIVE | Noted: 2017-01-17

## 2022-01-10 PROBLEM — I50.32 CHRONIC DIASTOLIC CONGESTIVE HEART FAILURE: Chronic | Status: ACTIVE | Noted: 2019-09-07

## 2022-01-10 PROBLEM — E78.5 HYPERLIPIDEMIA LDL GOAL <70: Chronic | Status: ACTIVE | Noted: 2017-02-02

## 2022-01-10 PROBLEM — Z95.1 S/P CABG X 2: Chronic | Status: ACTIVE | Noted: 2021-04-29

## 2022-01-10 NOTE — PROGRESS NOTES
Chief Complaint  Coronary Artery Disease (6mo F/U. No chest pain), Atrial Fibrillation (No palpitations or SOA. ), and Edema (BLE/Hands)    Subjective          Carlos A Monroemacario Cabello presents to [facility] for routine follow-up.  He has chronic diastolic congestive heart failure, coronary artery disease status post coronary artery stenting and CABG x2 (LIMA to LAD and SVG to OM) 7/6/2019, paroxysmal atrial fibrillation status post cardioversion on chronic anticoagulation, hypertension, hyperlipidemia, type 2 diabetes mellitus, peripheral vascular disease, cerebrovascular accident, obstructive sleep apnea and obesity. He continues to complain of intermittent, exertional, sharp, left sided chest pain that radiates to the left shoulder at times. The pain lasts a few minutes at a time and is resolved with rest. He reports a two month history of increased lower extremity edema. He reports two to three episodes of palpitations over the last two months. Patient denies shortness of breath, dizziness, syncope, orthopnea, PND or decreased stamina.  Patient denies any signs of bleeding.    Coronary Artery Disease  Presents for follow-up visit. Symptoms include chest pain, leg swelling and palpitations. Pertinent negatives include no chest pressure, chest tightness, dizziness, muscle weakness, shortness of breath or weight gain. Risk factors include hyperlipidemia and obesity. Risk factors do not include hypertension. His past medical history is significant for CHF. The symptoms have been stable. Compliance with diet is variable. Compliance with exercise is variable. Compliance with medications is good.   Atrial Fibrillation  Presents for follow-up visit. Symptoms include chest pain and palpitations. Symptoms are negative for an AICD problem, bradycardia, dizziness, hemodynamic instability, hypertension, hypotension, pacemaker problem, shortness of breath, syncope, tachycardia and weakness. The  "symptoms have been stable. Past medical history includes atrial fibrillation, CAD, CHF and hyperlipidemia. There are no medication compliance problems.   Hypertension  This is a chronic problem. The current episode started more than 1 year ago. The problem is controlled. Associated symptoms include chest pain and palpitations. Pertinent negatives include no anxiety, blurred vision, headaches, malaise/fatigue, neck pain, orthopnea, peripheral edema, PND, shortness of breath or sweats. Risk factors for coronary artery disease include male gender, obesity, diabetes mellitus and dyslipidemia. Current antihypertension treatment includes ACE inhibitors, diuretics and direct vasodilators. The current treatment provides significant improvement. Hypertensive end-organ damage includes CAD/MI, CVA and PVD. Identifiable causes of hypertension include sleep apnea.   Hyperlipidemia  This is a chronic problem. The current episode started more than 1 year ago. Exacerbating diseases include diabetes and obesity. Associated symptoms include chest pain. Pertinent negatives include no shortness of breath. Current antihyperlipidemic treatment includes statins. Risk factors for coronary artery disease include male sex, obesity, hypertension, dyslipidemia and diabetes mellitus.   Congestive Heart Failure  Presents for follow-up visit. Associated symptoms include chest pain, edema and palpitations. Pertinent negatives include no abdominal pain, chest pressure, claudication, fatigue, muscle weakness, near-syncope, nocturia, orthopnea, paroxysmal nocturnal dyspnea, shortness of breath or unexpected weight change. The symptoms have been stable. His past medical history is significant for CAD. Compliance with total regimen is 51-75%. Compliance with diet is 51-75%. Compliance with exercise is 51-75%. Compliance with medications is %.       Objective   Vital Signs:   /88   Pulse 76   Ht 182.9 cm (72\")   Wt 115 kg (253 lb)   BMI " 34.31 kg/m²     Vitals and nursing note reviewed.   Constitutional:       General: Awake.      Appearance: Normal and healthy appearance. Well-developed and not in distress. Obese.   Eyes:      General: Lids are normal.      Conjunctiva/sclera: Conjunctivae normal.      Pupils: Pupils are equal, round, and reactive to light.   HENT:      Head: Normocephalic and atraumatic.      Nose: Nose normal.   Neck:      Vascular: No JVR. JVD normal.   Pulmonary:      Effort: Pulmonary effort is normal.      Breath sounds: Normal breath sounds. No wheezing. No rhonchi. No rales.   Chest:      Chest wall: Not tender to palpatation.   Cardiovascular:      PMI at left midclavicular line. Normal rate. Regular rhythm. Normal S1. Normal S2.      Murmurs: There is no murmur.      No gallop. No click. No rub.   Pulses:     Intact distal pulses.   Edema:     Peripheral edema absent.   Abdominal:      General: Bowel sounds are normal.      Palpations: Abdomen is soft.      Tenderness: There is no abdominal tenderness.   Musculoskeletal: Normal range of motion.         General: No tenderness.      Cervical back: Normal range of motion. Skin:     General: Skin is warm and dry.   Neurological:      General: No focal deficit present.      Mental Status: Alert, oriented to person, place, and time and oriented to person, place and time.   Psychiatric:         Attention and Perception: Attention and perception normal.         Mood and Affect: Mood and affect normal.         Speech: Speech normal.         Behavior: Behavior normal. Behavior is cooperative.         Thought Content: Thought content normal.         Cognition and Memory: Cognition and memory normal.         Judgment: Judgment normal.        Result Review :   The following data was reviewed by: TEENA Buckner on 01/11/2022:  Common labs    Common Labsle 8/5/21 8/5/21 9/2/21 9/2/21 11/20/21 11/20/21    1437 1437 1400 1400 1032 1032   Glucose  127 (A)  144 (A)  289 (A)   BUN   18  15  16   Creatinine  0.99  1.02  1.00   eGFR Non African Am  76  74  75   Sodium  135 (A)  140  138   Potassium  4.4  4.1  4.4   Chloride  100  106  104   Calcium  9.9  9.9  9.0   Albumin  4.40  4.60  4.30   Total Bilirubin  1.0  0.6  0.7   Alkaline Phosphatase  96  114  98   AST (SGOT)  20  22  19   ALT (SGPT)  29  30  24   WBC 5.92  4.98  6.24    Hemoglobin 14.2  15.1  13.1    Hematocrit 41.6  44.9  40.8    Platelets 166  159  118 (A)    (A) Abnormal value       Comments are available for some flowsheets but are not being displayed.           Data reviewed: Cardiology studies 2d echo 4/29/21     ECG 12 Lead    Date/Time: 1/11/2022 10:00 AM  Performed by: Agnieszka Jeff APRN  Authorized by: Agnieszka Jeff APRN   Comparison: compared with previous ECG from 9/2/2021  Rhythm: sinus rhythm  Rate: normal  BPM: 76  Conduction: 1st degree AV block  QRS axis: right    Clinical impression: abnormal EKG              Assessment and Plan    Diagnoses and all orders for this visit:    1. Chronic diastolic congestive heart failure (HCC) (Primary)-NYHA class II.  Compensated.  Stop lisinopril.  Start Entresto 49/51 mg twice daily on the morning of 1/13/2022 following 36-hour washout.  BMP in 1 week. Reviewed signs and symptoms of CHF and what to report with the patient. Patient instructed to restrict sodium and weigh daily. Report weight gain of greater than 2 lbs overnight or 5 lbs in 1 week. Pt verbalized understanding of instructions and plan of care.     2. Coronary artery disease involving native coronary artery of native heart with stable angina pectoris (HCC)- increase Imdur to 120 mg daily.     3. S/P CABG x 2-LIMA to LAD and SVG to OM 7/6/2019 per Dr. Steven Tang at [facility].  Patient continues on aspirin.  Denies bleeding.    4. Paroxysmal atrial fibrillation (HCC)- in sinus rhythm today.  Anticoagulated.    5. Chronic anticoagulation-continue Eliquis.  Patient does not bleeding.    6.  Primary hypertension-blood pressure well controlled.  Continue to monitor following above medication adjustments.  Monitor and record daily blood pressure. Report readings consistently higher than 130/90 or consistently lower than 100/60.     7. Hyperlipidemia LDL goal <70-management per PCP.  Continue Crestor.    8. Bilateral carotid artery stenosis-patient follows with Dr. Chetan Echavarria with vascular surgery.    9. Type 2 diabetes mellitus with other circulatory complication, with long-term current use of insulin (HCC)-management per PCP.  Type 2 diabetes mellitus in the setting of obstructive coronary artery disease.    10. HOA on CPAP-patient reports compliance with CPAP.    11. Class 1 obesity due to excess calories with serious comorbidity and body mass index (BMI) of 34.0 to 34.9 in adult- Patient's Body mass index is 34.31 kg/m². indicating that he is obese (BMI >30). Obesity-related health conditions include the following: obstructive sleep apnea, hypertension, coronary heart disease, diabetes mellitus, dyslipidemias and peripheral vascular disease. Obesity is unchanged. BMI is is above average; no BMI management plan is appropriate. We discussed low calorie, low carb based diet program, portion control and increasing exercise.    12. Thoracic aortic aneurysm without rupture (HCC)-patient follows with Dr. Steven Tang with CT surgery for surveillance of 4.4 cm thoracic aortic aneurysm.      Follow Up   Return in about 4 weeks (around 2/8/2022) for Next scheduled follow up.  Patient was given instructions and counseling regarding his condition or for health maintenance advice. Please see specific information pulled into the AVS if appropriate.

## 2022-01-10 NOTE — PATIENT INSTRUCTIONS
Obesity, Adult  Obesity is having too much body fat. Being obese means that your weight is more than what is healthy for you.  BMI is a number that explains how much body fat you have. If you have a BMI of 30 or more, you are obese. Obesity is often caused by eating or drinking more calories than your body uses. Changing your lifestyle can help you lose weight.  Obesity can cause serious health problems, such as:  · Stroke.  · Coronary artery disease (CAD).  · Type 2 diabetes.  · Some types of cancer, including cancers of the colon, breast, uterus, and gallbladder.  · Osteoarthritis.  · High blood pressure (hypertension).  · High cholesterol.  · Sleep apnea.  · Gallbladder stones.  · Infertility problems.  What are the causes?  · Eating meals each day that are high in calories, sugar, and fat.  · Being born with genes that may make you more likely to become obese.  · Having a medical condition that causes obesity.  · Taking certain medicines.  · Sitting a lot (having a sedentary lifestyle).  · Not getting enough sleep.  · Drinking a lot of drinks that have sugar in them.  What increases the risk?  · Having a family history of obesity.  · Being an  woman.  · Being a  man.  · Living in an area with limited access to:  ? [place], recreation centers, or sidewalks.  ? Healthy food choices, such as grocery stores and [employer] markets.  What are the signs or symptoms?  The main sign is having too much body fat.  How is this treated?  · Treatment for this condition often includes changing your lifestyle. Treatment may include:  ? Changing your diet. This may include making a healthy meal plan.  ? Exercise. This may include activity that causes your heart to beat faster (aerobic exercise) and strength training. Work with your doctor to design a program that works for you.  ? Medicine to help you lose weight. This may be used if you are not able to lose 1 pound a week after 6 weeks of healthy eating and  more exercise.  ? Treating conditions that cause the obesity.  ? Surgery. Options may include gastric banding and gastric bypass. This may be done if:  § Other treatments have not helped to improve your condition.  § You have a BMI of 40 or higher.  § You have life-threatening health problems related to obesity.  Follow these instructions at home:  Eating and drinking    · Follow advice from your doctor about what to eat and drink. Your doctor may tell you to:  ? Limit fast food, sweets, and processed snack foods.  ? Choose low-fat options. For example, choose low-fat milk instead of whole milk.  ? Eat 5 or more servings of fruits or vegetables each day.  ? Eat at home more often. This gives you more control over what you eat.  ? Choose healthy foods when you eat out.  ? Learn to read food labels. This will help you learn how much food is in 1 serving.  ? Keep low-fat snacks available.  ? Avoid drinks that have a lot of sugar in them. These include soda, fruit juice, iced tea with sugar, and flavored milk.  · Drink enough water to keep your pee (urine) pale yellow.  · Do not go on fad diets.    Physical activity  · Exercise often, as told by your doctor. Most adults should get up to 150 minutes of moderate-intensity exercise every week.Ask your doctor:  ? What types of exercise are safe for you.  ? How often you should exercise.  · Warm up and stretch before being active.  · Do slow stretching after being active (cool down).  · Rest between times of being active.  Lifestyle  · Work with your doctor and a food expert (dietitian) to set a weight-loss goal that is best for you.  · Limit your screen time.  · Find ways to reward yourself that do not involve food.  · Do not drink alcohol if:  ? Your doctor tells you not to drink.  ? You are pregnant, may be pregnant, or are planning to become pregnant.  · If you drink alcohol:  ? Limit how much you use to:  § 0-1 drink a day for women.  § 0-2 drinks a day for men.  ? Be  aware of how much alcohol is in your drink. In the [place] one drink equals one 12 oz bottle of beer (355 mL), one 5 oz glass of wine (148 mL), or one 1½ oz glass of hard liquor (44 mL).  General instructions  · Keep a weight-loss journal. This can help you keep track of:  ? The food that you eat.  ? How much exercise you get.  · Take over-the-counter and prescription medicines only as told by your doctor.  · Take vitamins and supplements only as told by your doctor.  · Think about joining a support group.  · Keep all follow-up visits as told by your doctor. This is important.  Contact a doctor if:  · You cannot meet your weight loss goal after you have changed your diet and lifestyle for 6 weeks.  Get help right away if you:  · Are having trouble breathing.  · Are having thoughts of harming yourself.  Summary  · Obesity is having too much body fat.  · Being obese means that your weight is more than what is healthy for you.  · Work with your doctor to set a weight-loss goal.  · Get regular exercise as told by your doctor.  This information is not intended to replace advice given to you by your health care provider. Make sure you discuss any questions you have with your health care provider.  Document Revised: 08/22/2019 Document Reviewed: 08/22/2019  Elsevier Patient Education © 2021 [employer]

## 2022-01-11 ENCOUNTER — OFFICE VISIT (OUTPATIENT)
Dept: CARDIOLOGY | Facility: CLINIC | Age: 62
End: 2022-01-11

## 2022-01-11 VITALS
HEART RATE: 76 BPM | HEIGHT: 72 IN | SYSTOLIC BLOOD PRESSURE: 132 MMHG | BODY MASS INDEX: 34.27 KG/M2 | DIASTOLIC BLOOD PRESSURE: 88 MMHG | WEIGHT: 253 LBS

## 2022-01-11 DIAGNOSIS — I48.0 PAROXYSMAL ATRIAL FIBRILLATION: Chronic | ICD-10-CM

## 2022-01-11 DIAGNOSIS — E78.5 HYPERLIPIDEMIA LDL GOAL <70: Chronic | ICD-10-CM

## 2022-01-11 DIAGNOSIS — E66.09 CLASS 1 OBESITY DUE TO EXCESS CALORIES WITH SERIOUS COMORBIDITY AND BODY MASS INDEX (BMI) OF 34.0 TO 34.9 IN ADULT: ICD-10-CM

## 2022-01-11 DIAGNOSIS — I50.32 CHRONIC DIASTOLIC CONGESTIVE HEART FAILURE: Primary | Chronic | ICD-10-CM

## 2022-01-11 DIAGNOSIS — G47.33 OSA ON CPAP: Chronic | ICD-10-CM

## 2022-01-11 DIAGNOSIS — I65.23 BILATERAL CAROTID ARTERY STENOSIS: ICD-10-CM

## 2022-01-11 DIAGNOSIS — Z99.89 OSA ON CPAP: Chronic | ICD-10-CM

## 2022-01-11 DIAGNOSIS — I10 PRIMARY HYPERTENSION: Chronic | ICD-10-CM

## 2022-01-11 DIAGNOSIS — Z95.1 S/P CABG X 2: ICD-10-CM

## 2022-01-11 DIAGNOSIS — Z79.01 CHRONIC ANTICOAGULATION: Chronic | ICD-10-CM

## 2022-01-11 DIAGNOSIS — E11.59 TYPE 2 DIABETES MELLITUS WITH OTHER CIRCULATORY COMPLICATION, WITH LONG-TERM CURRENT USE OF INSULIN: Chronic | ICD-10-CM

## 2022-01-11 DIAGNOSIS — I25.110 CORONARY ARTERY DISEASE INVOLVING NATIVE CORONARY ARTERY OF NATIVE HEART WITH UNSTABLE ANGINA PECTORIS: Chronic | ICD-10-CM

## 2022-01-11 DIAGNOSIS — I71.20 THORACIC AORTIC ANEURYSM WITHOUT RUPTURE: ICD-10-CM

## 2022-01-11 DIAGNOSIS — Z79.4 TYPE 2 DIABETES MELLITUS WITH OTHER CIRCULATORY COMPLICATION, WITH LONG-TERM CURRENT USE OF INSULIN: Chronic | ICD-10-CM

## 2022-01-11 PROCEDURE — 93000 ELECTROCARDIOGRAM COMPLETE: CPT | Performed by: NURSE PRACTITIONER

## 2022-01-11 PROCEDURE — 99214 OFFICE O/P EST MOD 30 MIN: CPT | Performed by: NURSE PRACTITIONER

## 2022-01-11 RX ORDER — TAMSULOSIN HYDROCHLORIDE 0.4 MG/1
1 CAPSULE ORAL DAILY
COMMUNITY
End: 2022-08-15

## 2022-01-11 RX ORDER — SACUBITRIL AND VALSARTAN 49; 51 MG/1; MG/1
1 TABLET, FILM COATED ORAL 2 TIMES DAILY
Qty: 180 TABLET | Refills: 3 | Status: SHIPPED | OUTPATIENT
Start: 2022-01-13 | End: 2022-02-15 | Stop reason: DRUGHIGH

## 2022-01-11 RX ORDER — SACUBITRIL AND VALSARTAN 49; 51 MG/1; MG/1
1 TABLET, FILM COATED ORAL 2 TIMES DAILY
Qty: 60 TABLET | Refills: 0 | Status: SHIPPED | OUTPATIENT
Start: 2022-01-11 | End: 2022-01-11 | Stop reason: SDUPTHER

## 2022-01-11 RX ORDER — ISOSORBIDE MONONITRATE 120 MG/1
120 TABLET, EXTENDED RELEASE ORAL DAILY
Qty: 90 TABLET | Refills: 3 | Status: SHIPPED | OUTPATIENT
Start: 2022-01-11

## 2022-01-11 RX ORDER — FUROSEMIDE 40 MG/1
40 TABLET ORAL DAILY PRN
Qty: 90 TABLET | Refills: 3 | Status: SHIPPED | OUTPATIENT
Start: 2022-01-11 | End: 2022-02-05

## 2022-01-21 ENCOUNTER — LAB (OUTPATIENT)
Dept: LAB | Facility: HOSPITAL | Age: 62
End: 2022-01-21

## 2022-01-21 DIAGNOSIS — I50.32 CHRONIC DIASTOLIC CONGESTIVE HEART FAILURE: Chronic | ICD-10-CM

## 2022-01-21 LAB
ANION GAP SERPL CALCULATED.3IONS-SCNC: 9 MMOL/L (ref 5–15)
BUN SERPL-MCNC: 14 MG/DL (ref 8–23)
BUN/CREAT SERPL: 14 (ref 7–25)
CALCIUM SPEC-SCNC: 9.7 MG/DL (ref 8.6–10.5)
CHLORIDE SERPL-SCNC: 105 MMOL/L (ref 98–107)
CO2 SERPL-SCNC: 27 MMOL/L (ref 22–29)
CREAT SERPL-MCNC: 1 MG/DL (ref 0.76–1.27)
GFR SERPL CREATININE-BSD FRML MDRD: 75 ML/MIN/1.73
GLUCOSE SERPL-MCNC: 99 MG/DL (ref 65–99)
POTASSIUM SERPL-SCNC: 4.8 MMOL/L (ref 3.5–5.2)
SODIUM SERPL-SCNC: 141 MMOL/L (ref 136–145)

## 2022-01-21 PROCEDURE — 80048 BASIC METABOLIC PNL TOTAL CA: CPT

## 2022-01-21 PROCEDURE — 36415 COLL VENOUS BLD VENIPUNCTURE: CPT

## 2022-02-05 PROCEDURE — U0004 COV-19 TEST NON-CDC HGH THRU: HCPCS | Performed by: FAMILY MEDICINE

## 2022-02-07 ENCOUNTER — TELEPHONE (OUTPATIENT)
Dept: CARDIAC SURGERY | Facility: CLINIC | Age: 62
End: 2022-02-07

## 2022-02-07 RX ORDER — SACUBITRIL AND VALSARTAN 49; 51 MG/1; MG/1
TABLET, FILM COATED ORAL
Qty: 60 TABLET | OUTPATIENT
Start: 2022-02-07

## 2022-02-07 NOTE — TELEPHONE ENCOUNTER
Pt called today to r/s his office appointment and states that he tested positive for COVID on 02/05/2022 and will be in quarantine until 02/12/2022 (not 02/14/2022 as listed in cancellation reason). Pt was rescheduled to 02/16/2022 and was transferred to central scheduling to r/s his CT. Pt states he will call back to r/s if still not feeling well prior to next office visit.

## 2022-02-09 ENCOUNTER — APPOINTMENT (OUTPATIENT)
Dept: CT IMAGING | Facility: HOSPITAL | Age: 62
End: 2022-02-09

## 2022-02-14 PROBLEM — I25.118 CORONARY ARTERY DISEASE OF NATIVE ARTERY OF NATIVE HEART WITH STABLE ANGINA PECTORIS (HCC): Chronic | Status: ACTIVE | Noted: 2017-01-17

## 2022-02-14 PROBLEM — I25.118 CORONARY ARTERY DISEASE OF NATIVE ARTERY OF NATIVE HEART WITH STABLE ANGINA PECTORIS: Status: ACTIVE | Noted: 2017-01-17

## 2022-02-14 NOTE — PATIENT INSTRUCTIONS
Obesity, Adult  Obesity is having too much body fat. Being obese means that your weight is more than what is healthy for you.  BMI is a number that explains how much body fat you have. If you have a BMI of 30 or more, you are obese. Obesity is often caused by eating or drinking more calories than your body uses. Changing your lifestyle can help you lose weight.  Obesity can cause serious health problems, such as:  · Stroke.  · Coronary artery disease (CAD).  · Type 2 diabetes.  · Some types of cancer, including cancers of the colon, breast, uterus, and gallbladder.  · Osteoarthritis.  · High blood pressure (hypertension).  · High cholesterol.  · Sleep apnea.  · Gallbladder stones.  · Infertility problems.  What are the causes?  · Eating meals each day that are high in calories, sugar, and fat.  · Being born with genes that may make you more likely to become obese.  · Having a medical condition that causes obesity.  · Taking certain medicines.  · Sitting a lot (having a sedentary lifestyle).  · Not getting enough sleep.  · Drinking a lot of drinks that have sugar in them.  What increases the risk?  · Having a family history of obesity.  · Being an  woman.  · Being a  man.  · Living in an area with limited access to:  ? [place], recreation centers, or sidewalks.  ? Healthy food choices, such as grocery stores and [employer] markets.  What are the signs or symptoms?  The main sign is having too much body fat.  How is this treated?  · Treatment for this condition often includes changing your lifestyle. Treatment may include:  ? Changing your diet. This may include making a healthy meal plan.  ? Exercise. This may include activity that causes your heart to beat faster (aerobic exercise) and strength training. Work with your doctor to design a program that works for you.  ? Medicine to help you lose weight. This may be used if you are not able to lose 1 pound a week after 6 weeks of healthy eating and  more exercise.  ? Treating conditions that cause the obesity.  ? Surgery. Options may include gastric banding and gastric bypass. This may be done if:  § Other treatments have not helped to improve your condition.  § You have a BMI of 40 or higher.  § You have life-threatening health problems related to obesity.  Follow these instructions at home:  Eating and drinking    · Follow advice from your doctor about what to eat and drink. Your doctor may tell you to:  ? Limit fast food, sweets, and processed snack foods.  ? Choose low-fat options. For example, choose low-fat milk instead of whole milk.  ? Eat 5 or more servings of fruits or vegetables each day.  ? Eat at home more often. This gives you more control over what you eat.  ? Choose healthy foods when you eat out.  ? Learn to read food labels. This will help you learn how much food is in 1 serving.  ? Keep low-fat snacks available.  ? Avoid drinks that have a lot of sugar in them. These include soda, fruit juice, iced tea with sugar, and flavored milk.  · Drink enough water to keep your pee (urine) pale yellow.  · Do not go on fad diets.    Physical activity  · Exercise often, as told by your doctor. Most adults should get up to 150 minutes of moderate-intensity exercise every week.Ask your doctor:  ? What types of exercise are safe for you.  ? How often you should exercise.  · Warm up and stretch before being active.  · Do slow stretching after being active (cool down).  · Rest between times of being active.  Lifestyle  · Work with your doctor and a food expert (dietitian) to set a weight-loss goal that is best for you.  · Limit your screen time.  · Find ways to reward yourself that do not involve food.  · Do not drink alcohol if:  ? Your doctor tells you not to drink.  ? You are pregnant, may be pregnant, or are planning to become pregnant.  · If you drink alcohol:  ? Limit how much you use to:  § 0-1 drink a day for women.  § 0-2 drinks a day for men.  ? Be  aware of how much alcohol is in your drink. In the [place] one drink equals one 12 oz bottle of beer (355 mL), one 5 oz glass of wine (148 mL), or one 1½ oz glass of hard liquor (44 mL).  General instructions  · Keep a weight-loss journal. This can help you keep track of:  ? The food that you eat.  ? How much exercise you get.  · Take over-the-counter and prescription medicines only as told by your doctor.  · Take vitamins and supplements only as told by your doctor.  · Think about joining a support group.  · Keep all follow-up visits as told by your doctor. This is important.  Contact a doctor if:  · You cannot meet your weight loss goal after you have changed your diet and lifestyle for 6 weeks.  Get help right away if you:  · Are having trouble breathing.  · Are having thoughts of harming yourself.  Summary  · Obesity is having too much body fat.  · Being obese means that your weight is more than what is healthy for you.  · Work with your doctor to set a weight-loss goal.  · Get regular exercise as told by your doctor.  This information is not intended to replace advice given to you by your health care provider. Make sure you discuss any questions you have with your health care provider.  Document Revised: 08/22/2019 Document Reviewed: 08/22/2019  Elsevier Patient Education © 2021 [employer]

## 2022-02-14 NOTE — PROGRESS NOTES
Chief Complaint  Congestive Heart Failure (Started Entresto LOV. States he has been feeling better since starting. Occ edema. ), Results (Labs), and Surgical Clearance (Knee surgery. Dr. Joshua Hanson)    Subjective          Carlos A Boyer  presents to [facility] CARDIOLOGY for routine follow-up of medication adjustments.  Lisinopril was discontinued and he was started on Entresto 49/51 mg twice daily following his last office visit on 1/11/2022.  Imdur was increased to 120 mg daily at that time as well.  Follow-up CMP on 1/21/2022 revealed normal creatinine and potassium.  He has chronic diastolic congestive heart failure, coronary artery disease status post coronary artery stenting and CABG x2 (LIMA to LAD and SVG to OM) 7/6/2019, paroxysmal atrial fibrillation status post cardioversion on chronic anticoagulation, hypertension, hyperlipidemia, type 2 diabetes mellitus, peripheral vascular disease, cerebrovascular accident, obstructive sleep apnea and obesity. He reports decrease in frequency and severity of chest pain since increasing Imdur to 120 mg. He reports improvement in bilateral lower extremity edema since starting Entresto. He reports intermittent palpitations. Patient denies shortness of breath, dizziness, syncope, orthopnea, PND or decreased stamina.  Patient denies any signs of bleeding.    Coronary Artery Disease  Presents for follow-up visit. Symptoms include chest pain, leg swelling and palpitations. Pertinent negatives include no chest pressure, chest tightness, dizziness, muscle weakness, shortness of breath or weight gain. Risk factors include hyperlipidemia and obesity. Risk factors do not include hypertension. His past medical history is significant for CHF. The symptoms have been stable. Compliance with diet is variable. Compliance with exercise is variable. Compliance with medications is good.   Atrial Fibrillation  Presents for follow-up visit. Symptoms include  chest pain and palpitations. Symptoms are negative for an AICD problem, bradycardia, dizziness, hemodynamic instability, hypertension, hypotension, pacemaker problem, shortness of breath, syncope, tachycardia and weakness. The symptoms have been stable. Past medical history includes atrial fibrillation, CAD, CHF and hyperlipidemia. There are no medication compliance problems.   Hypertension  This is a chronic problem. The current episode started more than 1 year ago. The problem is controlled. Associated symptoms include chest pain and palpitations. Pertinent negatives include no anxiety, blurred vision, headaches, malaise/fatigue, neck pain, orthopnea, peripheral edema, PND, shortness of breath or sweats. Risk factors for coronary artery disease include male gender, obesity, diabetes mellitus and dyslipidemia. Current antihypertension treatment includes ACE inhibitors, diuretics and direct vasodilators. The current treatment provides significant improvement. Hypertensive end-organ damage includes CAD/MI, CVA and PVD. Identifiable causes of hypertension include sleep apnea.   Hyperlipidemia  This is a chronic problem. The current episode started more than 1 year ago. Exacerbating diseases include diabetes and obesity. Associated symptoms include chest pain. Pertinent negatives include no shortness of breath. Current antihyperlipidemic treatment includes statins. Risk factors for coronary artery disease include male sex, obesity, hypertension, dyslipidemia and diabetes mellitus.   Congestive Heart Failure  Presents for follow-up visit. Associated symptoms include chest pain, edema and palpitations. Pertinent negatives include no abdominal pain, chest pressure, claudication, fatigue, muscle weakness, near-syncope, nocturia, orthopnea, paroxysmal nocturnal dyspnea, shortness of breath or unexpected weight change. The symptoms have been stable. His past medical history is significant for CAD. Compliance with total  "regimen is 51-75%. Compliance with diet is 51-75%. Compliance with exercise is 51-75%. Compliance with medications is %.       Objective   Vital Signs:   /84   Pulse 77   Ht 182.9 cm (72\")   Wt 114 kg (252 lb)   SpO2 98%   BMI 34.18 kg/m²     Vitals and nursing note reviewed.   Constitutional:       General: Awake.      Appearance: Normal and healthy appearance. Well-developed and not in distress. Obese.   Eyes:      General: Lids are normal.      Conjunctiva/sclera: Conjunctivae normal.      Pupils: Pupils are equal, round, and reactive to light.   HENT:      Head: Normocephalic and atraumatic.      Nose: Nose normal.   Neck:      Vascular: No JVR. JVD normal.   Pulmonary:      Effort: Pulmonary effort is normal.      Breath sounds: Normal breath sounds. No wheezing. No rhonchi. No rales.   Chest:      Chest wall: Not tender to palpatation.   Cardiovascular:      PMI at left midclavicular line. Normal rate. Regular rhythm. Normal S1. Normal S2.      Murmurs: There is a grade 2/6 harsh midsystolic murmur at the URSB, radiating to the neck. There is a grade 2/4 high frequency blowing decrescendo, early diastolic murmur at the URSB, radiating to the apex.      No gallop. No click. No rub.   Pulses:     Intact distal pulses.   Edema:     Peripheral edema absent.   Abdominal:      General: Bowel sounds are normal.      Palpations: Abdomen is soft.      Tenderness: There is no abdominal tenderness.   Musculoskeletal: Normal range of motion.         General: No tenderness.      Cervical back: Normal range of motion. Skin:     General: Skin is warm and dry.   Neurological:      General: No focal deficit present.      Mental Status: Alert, oriented to person, place, and time and oriented to person, place and time.   Psychiatric:         Attention and Perception: Attention and perception normal.         Mood and Affect: Mood and affect normal.         Speech: Speech normal.         Behavior: Behavior normal. " Behavior is cooperative.         Thought Content: Thought content normal.         Cognition and Memory: Cognition and memory normal.         Judgment: Judgment normal.        Result Review :   The following data was reviewed by: TEENA Buckner on 01/11/2022:  Common labs    Common Labsle 9/2/21 9/2/21 11/20/21 11/20/21 1/21/22    1400 1400 1032 1032    Glucose  144 (A)  289 (A) 99   BUN  15  16 14   Creatinine  1.02  1.00 1.00   eGFR Non African Am  74  75 75   Sodium  140  138 141   Potassium  4.1  4.4 4.8   Chloride  106  104 105   Calcium  9.9  9.0 9.7   Albumin  4.60  4.30    Total Bilirubin  0.6  0.7    Alkaline Phosphatase  114  98    AST (SGOT)  22  19    ALT (SGPT)  30  24    WBC 4.98  6.24     Hemoglobin 15.1  13.1     Hematocrit 44.9  40.8     Platelets 159  118 (A)     (A) Abnormal value       Comments are available for some flowsheets but are not being displayed.           Data reviewed: Cardiology studies 2d echo 4/29/21          Assessment and Plan    Diagnoses and all orders for this visit:    1. Chronic diastolic congestive heart failure (HCC) (Primary)-NYHA class II.  Compensated.  Increase Entresto to 97/103 mg twice daily.  BMP in 1 week. Reviewed signs and symptoms of CHF and what to report with the patient. Patient instructed to restrict sodium and weigh daily. Report weight gain of greater than 2 lbs overnight or 5 lbs in 1 week. Pt verbalized understanding of instructions and plan of care.     2. Coronary artery disease involving native coronary artery of native heart with stable angina pectoris (HCC)- Continue Imdur.    3. S/P CABG x 2-LIMA to LAD and SVG to OM 7/6/2019 per Dr. Steven Tang at [facility].  Patient continues on aspirin.  Denies bleeding.    4. Paroxysmal atrial fibrillation (HCC)- in sinus rhythm today.  Anticoagulated.    5. Chronic anticoagulation-continue Eliquis.  Patient does not bleeding.    6. Primary hypertension-blood pressure well controlled.   Continue to monitor following above medication adjustments.  Monitor and record daily blood pressure. Report readings consistently higher than 130/90 or consistently lower than 100/60.     7. Hyperlipidemia LDL goal <70-management per PCP.  Continue Crestor.    8. Bilateral carotid artery stenosis-patient follows with Dr. Chetan Echavarria with vascular surgery.    9. Type 2 diabetes mellitus with other circulatory complication, with long-term current use of insulin (HCC)-management per PCP.  Type 2 diabetes mellitus in the setting of obstructive coronary artery disease.    10. HOA on CPAP-patient reports compliance with CPAP.    11. Class 1 obesity due to excess calories with serious comorbidity and body mass index (BMI) of 34.0 to 34.9 in adult- Patient's Body mass index is 34.18 kg/m². indicating that he is obese (BMI >30). Obesity-related health conditions include the following: obstructive sleep apnea, hypertension, coronary heart disease, diabetes mellitus, dyslipidemias and peripheral vascular disease. Obesity is unchanged. BMI is is above average; no BMI management plan is appropriate. We discussed low calorie, low carb based diet program, portion control and increasing exercise.    12. Thoracic aortic aneurysm without rupture (HCC)-patient follows with Dr. Steven Tang with CT surgery for surveillance of 4.4 cm thoracic aortic aneurysm.    Of note, pt has plans for knee surgery in the near future and requests cardiac risk assessment.   Risk assessment- from a revised cardiac risk index standpoint, patient is high risk for a major cardiac event with this surgery. This is primarily because he has a known history of coronary/ischemic heart disease, congestive heart failure, cerebrovascular disease, insulin-dependent diabetes mellitus, but no known history of creatinine greater than 2. This correlates to a >11% estimated rate of myocardial infarction, pulmonary edema, ventricular fibrillation, cardiac arrest,  "or complete heart block. However, this is non-modifiable because he has no signs or symptoms of the following \"active cardiac conditions\"- acute coronary syndrome, acutely decompensated congestive heart failure, uncontrolled arrhythmias, or significant valvular disease. Therefore, recommend proceeding with the planned surgery without further delay. Continue aspirin without interruption.    Follow Up   Return in about 4 weeks (around 3/15/2022) for Next scheduled follow up.  Patient was given instructions and counseling regarding his condition or for health maintenance advice. Please see specific information pulled into the AVS if appropriate.       "

## 2022-02-15 ENCOUNTER — HOSPITAL ENCOUNTER (OUTPATIENT)
Dept: CT IMAGING | Facility: HOSPITAL | Age: 62
Discharge: HOME | End: 2022-02-15
Admitting: THORACIC SURGERY (CARDIOTHORACIC VASCULAR SURGERY)

## 2022-02-15 ENCOUNTER — OFFICE VISIT (OUTPATIENT)
Dept: CARDIOLOGY | Facility: CLINIC | Age: 62
End: 2022-02-15

## 2022-02-15 VITALS
DIASTOLIC BLOOD PRESSURE: 84 MMHG | SYSTOLIC BLOOD PRESSURE: 116 MMHG | WEIGHT: 252 LBS | HEART RATE: 77 BPM | OXYGEN SATURATION: 98 % | HEIGHT: 72 IN | BODY MASS INDEX: 34.13 KG/M2

## 2022-02-15 DIAGNOSIS — Z95.1 S/P CABG X 2: Chronic | ICD-10-CM

## 2022-02-15 DIAGNOSIS — E11.59 TYPE 2 DIABETES MELLITUS WITH OTHER CIRCULATORY COMPLICATION, WITH LONG-TERM CURRENT USE OF INSULIN: Chronic | ICD-10-CM

## 2022-02-15 DIAGNOSIS — R91.8 LUNG NODULES: ICD-10-CM

## 2022-02-15 DIAGNOSIS — I48.0 PAROXYSMAL ATRIAL FIBRILLATION: Chronic | ICD-10-CM

## 2022-02-15 DIAGNOSIS — Z99.89 OSA ON CPAP: Chronic | ICD-10-CM

## 2022-02-15 DIAGNOSIS — I10 PRIMARY HYPERTENSION: Chronic | ICD-10-CM

## 2022-02-15 DIAGNOSIS — I71.20 THORACIC AORTIC ANEURYSM WITHOUT RUPTURE: ICD-10-CM

## 2022-02-15 DIAGNOSIS — I25.118 CORONARY ARTERY DISEASE OF NATIVE ARTERY OF NATIVE HEART WITH STABLE ANGINA PECTORIS: ICD-10-CM

## 2022-02-15 DIAGNOSIS — E66.09 CLASS 1 OBESITY DUE TO EXCESS CALORIES WITH SERIOUS COMORBIDITY AND BODY MASS INDEX (BMI) OF 34.0 TO 34.9 IN ADULT: ICD-10-CM

## 2022-02-15 DIAGNOSIS — I65.23 BILATERAL CAROTID ARTERY STENOSIS: Chronic | ICD-10-CM

## 2022-02-15 DIAGNOSIS — I50.32 CHRONIC DIASTOLIC CONGESTIVE HEART FAILURE: Primary | Chronic | ICD-10-CM

## 2022-02-15 DIAGNOSIS — Z79.4 TYPE 2 DIABETES MELLITUS WITH OTHER CIRCULATORY COMPLICATION, WITH LONG-TERM CURRENT USE OF INSULIN: Chronic | ICD-10-CM

## 2022-02-15 DIAGNOSIS — Z79.01 CHRONIC ANTICOAGULATION: Chronic | ICD-10-CM

## 2022-02-15 DIAGNOSIS — E78.5 HYPERLIPIDEMIA LDL GOAL <70: Chronic | ICD-10-CM

## 2022-02-15 DIAGNOSIS — G47.33 OSA ON CPAP: Chronic | ICD-10-CM

## 2022-02-15 LAB — CREAT BLDA-MCNC: 0.9 MG/DL (ref 0.6–1.3)

## 2022-02-15 PROCEDURE — 71275 CT ANGIOGRAPHY CHEST: CPT

## 2022-02-15 PROCEDURE — 99214 OFFICE O/P EST MOD 30 MIN: CPT | Performed by: NURSE PRACTITIONER

## 2022-02-15 PROCEDURE — 82565 ASSAY OF CREATININE: CPT

## 2022-02-15 PROCEDURE — 0 IOPAMIDOL PER 1 ML: Performed by: THORACIC SURGERY (CARDIOTHORACIC VASCULAR SURGERY)

## 2022-02-15 RX ORDER — SACUBITRIL AND VALSARTAN 97; 103 MG/1; MG/1
1 TABLET, FILM COATED ORAL 2 TIMES DAILY
Qty: 180 TABLET | Refills: 3 | Status: SHIPPED | OUTPATIENT
Start: 2022-02-15 | End: 2023-02-15

## 2022-02-15 RX ADMIN — IOPAMIDOL 100 ML: 755 INJECTION, SOLUTION INTRAVENOUS at 11:38

## 2022-02-16 ENCOUNTER — OFFICE VISIT (OUTPATIENT)
Dept: CARDIAC SURGERY | Facility: CLINIC | Age: 62
End: 2022-02-16

## 2022-02-16 VITALS
HEART RATE: 80 BPM | WEIGHT: 253.2 LBS | SYSTOLIC BLOOD PRESSURE: 126 MMHG | HEIGHT: 72 IN | DIASTOLIC BLOOD PRESSURE: 72 MMHG | OXYGEN SATURATION: 96 % | BODY MASS INDEX: 34.29 KG/M2

## 2022-02-16 DIAGNOSIS — Z95.1 S/P CABG X 2: Chronic | ICD-10-CM

## 2022-02-16 DIAGNOSIS — I71.20 THORACIC AORTIC ANEURYSM WITHOUT RUPTURE: Primary | ICD-10-CM

## 2022-02-16 DIAGNOSIS — R91.8 LUNG NODULES: ICD-10-CM

## 2022-02-16 PROCEDURE — 99213 OFFICE O/P EST LOW 20 MIN: CPT | Performed by: THORACIC SURGERY (CARDIOTHORACIC VASCULAR SURGERY)

## 2022-02-17 DIAGNOSIS — I71.20 THORACIC AORTIC ANEURYSM WITHOUT RUPTURE: Primary | ICD-10-CM

## 2022-02-23 ENCOUNTER — TRANSCRIBE ORDERS (OUTPATIENT)
Dept: LAB | Facility: HOSPITAL | Age: 62
End: 2022-02-23

## 2022-02-23 DIAGNOSIS — Z11.59 SCREENING FOR VIRAL DISEASE: Primary | ICD-10-CM

## 2022-02-25 PROBLEM — M23.200 OLD COMPLEX TEAR OF LATERAL MENISCUS OF RIGHT KNEE: Status: ACTIVE | Noted: 2022-02-25

## 2022-02-26 ENCOUNTER — LAB (OUTPATIENT)
Dept: LAB | Facility: HOSPITAL | Age: 62
End: 2022-02-26

## 2022-02-26 DIAGNOSIS — Z11.59 SCREENING FOR VIRAL DISEASE: Primary | ICD-10-CM

## 2022-02-26 LAB — SARS-COV-2 ORF1AB RESP QL NAA+PROBE: NOT DETECTED

## 2022-02-26 PROCEDURE — U0004 COV-19 TEST NON-CDC HGH THRU: HCPCS

## 2022-02-26 PROCEDURE — C9803 HOPD COVID-19 SPEC COLLECT: HCPCS

## 2022-02-28 ENCOUNTER — PRE-ADMISSION TESTING (OUTPATIENT)
Dept: PREADMISSION TESTING | Facility: HOSPITAL | Age: 62
End: 2022-02-28

## 2022-02-28 VITALS
SYSTOLIC BLOOD PRESSURE: 144 MMHG | DIASTOLIC BLOOD PRESSURE: 84 MMHG | OXYGEN SATURATION: 95 % | HEIGHT: 72 IN | WEIGHT: 257.06 LBS | BODY MASS INDEX: 34.82 KG/M2 | HEART RATE: 86 BPM | RESPIRATION RATE: 18 BRPM

## 2022-02-28 LAB
DEPRECATED RDW RBC AUTO: 44.7 FL (ref 37–54)
ERYTHROCYTE [DISTWIDTH] IN BLOOD BY AUTOMATED COUNT: 13.2 % (ref 12.3–15.4)
HCT VFR BLD AUTO: 46.5 % (ref 37.5–51)
HGB BLD-MCNC: 14.8 G/DL (ref 13–17.7)
MCH RBC QN AUTO: 29.6 PG (ref 26.6–33)
MCHC RBC AUTO-ENTMCNC: 31.8 G/DL (ref 31.5–35.7)
MCV RBC AUTO: 93 FL (ref 79–97)
PLATELET # BLD AUTO: 116 10*3/MM3 (ref 140–450)
PMV BLD AUTO: 11.1 FL (ref 6–12)
RBC # BLD AUTO: 5 10*6/MM3 (ref 4.14–5.8)
WBC NRBC COR # BLD: 4.86 10*3/MM3 (ref 3.4–10.8)

## 2022-02-28 PROCEDURE — 36415 COLL VENOUS BLD VENIPUNCTURE: CPT

## 2022-02-28 PROCEDURE — 85027 COMPLETE CBC AUTOMATED: CPT

## 2022-03-01 ENCOUNTER — ANESTHESIA EVENT (OUTPATIENT)
Dept: PERIOP | Facility: HOSPITAL | Age: 62
End: 2022-03-01

## 2022-03-01 ENCOUNTER — HOSPITAL ENCOUNTER (OUTPATIENT)
Facility: HOSPITAL | Age: 62
Setting detail: HOSPITAL OUTPATIENT SURGERY
Discharge: HOME | End: 2022-03-01
Attending: ORTHOPAEDIC SURGERY | Admitting: ORTHOPAEDIC SURGERY

## 2022-03-01 ENCOUNTER — ANESTHESIA (OUTPATIENT)
Dept: PERIOP | Facility: HOSPITAL | Age: 62
End: 2022-03-01

## 2022-03-01 VITALS
TEMPERATURE: 98.6 F | OXYGEN SATURATION: 94 % | DIASTOLIC BLOOD PRESSURE: 74 MMHG | SYSTOLIC BLOOD PRESSURE: 128 MMHG | RESPIRATION RATE: 18 BRPM | HEART RATE: 73 BPM

## 2022-03-01 DIAGNOSIS — M23.200 OLD COMPLEX TEAR OF LATERAL MENISCUS OF RIGHT KNEE: Primary | ICD-10-CM

## 2022-03-01 LAB
ANION GAP SERPL CALCULATED.3IONS-SCNC: 12 MMOL/L (ref 5–15)
BUN SERPL-MCNC: 19 MG/DL (ref 8–23)
BUN/CREAT SERPL: 21.8 (ref 7–25)
CALCIUM SPEC-SCNC: 9.5 MG/DL (ref 8.6–10.5)
CHLORIDE SERPL-SCNC: 105 MMOL/L (ref 98–107)
CO2 SERPL-SCNC: 22 MMOL/L (ref 22–29)
CREAT SERPL-MCNC: 0.87 MG/DL (ref 0.76–1.27)
EGFRCR SERPLBLD CKD-EPI 2021: 97 ML/MIN/1.73
GLUCOSE BLDC GLUCOMTR-MCNC: 147 MG/DL (ref 70–130)
GLUCOSE BLDC GLUCOMTR-MCNC: 186 MG/DL (ref 70–130)
GLUCOSE SERPL-MCNC: 202 MG/DL (ref 65–99)
POTASSIUM SERPL-SCNC: 4.7 MMOL/L (ref 3.5–5.2)
SODIUM SERPL-SCNC: 139 MMOL/L (ref 136–145)

## 2022-03-01 PROCEDURE — 80048 BASIC METABOLIC PNL TOTAL CA: CPT

## 2022-03-01 PROCEDURE — 25010000002 CEFAZOLIN PER 500 MG

## 2022-03-01 PROCEDURE — 25010000002 FENTANYL CITRATE (PF) 100 MCG/2ML SOLUTION: Performed by: NURSE ANESTHETIST, CERTIFIED REGISTERED

## 2022-03-01 PROCEDURE — 25010000002 PROPOFOL 10 MG/ML EMULSION: Performed by: NURSE ANESTHETIST, CERTIFIED REGISTERED

## 2022-03-01 PROCEDURE — 82962 GLUCOSE BLOOD TEST: CPT

## 2022-03-01 PROCEDURE — 25010000002 ONDANSETRON PER 1 MG: Performed by: NURSE ANESTHETIST, CERTIFIED REGISTERED

## 2022-03-01 PROCEDURE — 36415 COLL VENOUS BLD VENIPUNCTURE: CPT

## 2022-03-01 PROCEDURE — 25010000002 DEXAMETHASONE PER 1 MG: Performed by: NURSE ANESTHETIST, CERTIFIED REGISTERED

## 2022-03-01 RX ORDER — ONDANSETRON 4 MG/1
4 TABLET, FILM COATED ORAL EVERY 8 HOURS PRN
Qty: 10 TABLET | Refills: 0 | Status: SHIPPED | OUTPATIENT
Start: 2022-03-01 | End: 2022-03-15

## 2022-03-01 RX ORDER — OXYCODONE AND ACETAMINOPHEN 7.5; 325 MG/1; MG/1
2 TABLET ORAL EVERY 4 HOURS PRN
Status: DISCONTINUED | OUTPATIENT
Start: 2022-03-01 | End: 2022-03-01 | Stop reason: HOSPADM

## 2022-03-01 RX ORDER — FENTANYL CITRATE 50 UG/ML
25 INJECTION, SOLUTION INTRAMUSCULAR; INTRAVENOUS
Status: DISCONTINUED | OUTPATIENT
Start: 2022-03-01 | End: 2022-03-01 | Stop reason: HOSPADM

## 2022-03-01 RX ORDER — BUPIVACAINE HCL/0.9 % NACL/PF 0.1 %
2 PLASTIC BAG, INJECTION (ML) EPIDURAL ONCE
Status: COMPLETED | OUTPATIENT
Start: 2022-03-01 | End: 2022-03-01

## 2022-03-01 RX ORDER — DEXAMETHASONE SODIUM PHOSPHATE 4 MG/ML
INJECTION, SOLUTION INTRA-ARTICULAR; INTRALESIONAL; INTRAMUSCULAR; INTRAVENOUS; SOFT TISSUE AS NEEDED
Status: DISCONTINUED | OUTPATIENT
Start: 2022-03-01 | End: 2022-03-01 | Stop reason: SURG

## 2022-03-01 RX ORDER — IBUPROFEN 600 MG/1
600 TABLET ORAL ONCE AS NEEDED
Status: DISCONTINUED | OUTPATIENT
Start: 2022-03-01 | End: 2022-03-01 | Stop reason: HOSPADM

## 2022-03-01 RX ORDER — FENTANYL CITRATE 50 UG/ML
INJECTION, SOLUTION INTRAMUSCULAR; INTRAVENOUS AS NEEDED
Status: DISCONTINUED | OUTPATIENT
Start: 2022-03-01 | End: 2022-03-01 | Stop reason: SURG

## 2022-03-01 RX ORDER — LABETALOL HYDROCHLORIDE 5 MG/ML
5 INJECTION, SOLUTION INTRAVENOUS
Status: DISCONTINUED | OUTPATIENT
Start: 2022-03-01 | End: 2022-03-01 | Stop reason: HOSPADM

## 2022-03-01 RX ORDER — SODIUM CHLORIDE, SODIUM LACTATE, POTASSIUM CHLORIDE, CALCIUM CHLORIDE 600; 310; 30; 20 MG/100ML; MG/100ML; MG/100ML; MG/100ML
100 INJECTION, SOLUTION INTRAVENOUS CONTINUOUS PRN
Status: DISCONTINUED | OUTPATIENT
Start: 2022-03-01 | End: 2022-03-01 | Stop reason: HOSPADM

## 2022-03-01 RX ORDER — LIDOCAINE HYDROCHLORIDE 10 MG/ML
0.5 INJECTION, SOLUTION EPIDURAL; INFILTRATION; INTRACAUDAL; PERINEURAL ONCE AS NEEDED
Status: DISCONTINUED | OUTPATIENT
Start: 2022-03-01 | End: 2022-03-01 | Stop reason: HOSPADM

## 2022-03-01 RX ORDER — DROPERIDOL 2.5 MG/ML
0.62 INJECTION, SOLUTION INTRAMUSCULAR; INTRAVENOUS ONCE AS NEEDED
Status: DISCONTINUED | OUTPATIENT
Start: 2022-03-01 | End: 2022-03-01 | Stop reason: HOSPADM

## 2022-03-01 RX ORDER — DEXTROSE MONOHYDRATE 25 G/50ML
12.5 INJECTION, SOLUTION INTRAVENOUS AS NEEDED
Status: DISCONTINUED | OUTPATIENT
Start: 2022-03-01 | End: 2022-03-01 | Stop reason: HOSPADM

## 2022-03-01 RX ORDER — SODIUM CHLORIDE 0.9 % (FLUSH) 0.9 %
10 SYRINGE (ML) INJECTION EVERY 12 HOURS SCHEDULED
Status: DISCONTINUED | OUTPATIENT
Start: 2022-03-01 | End: 2022-03-01 | Stop reason: HOSPADM

## 2022-03-01 RX ORDER — LIDOCAINE HYDROCHLORIDE 20 MG/ML
INJECTION, SOLUTION EPIDURAL; INFILTRATION; INTRACAUDAL; PERINEURAL AS NEEDED
Status: DISCONTINUED | OUTPATIENT
Start: 2022-03-01 | End: 2022-03-01 | Stop reason: SURG

## 2022-03-01 RX ORDER — NALOXONE HCL 0.4 MG/ML
0.4 VIAL (ML) INJECTION AS NEEDED
Status: DISCONTINUED | OUTPATIENT
Start: 2022-03-01 | End: 2022-03-01 | Stop reason: HOSPADM

## 2022-03-01 RX ORDER — ONDANSETRON 2 MG/ML
4 INJECTION INTRAMUSCULAR; INTRAVENOUS ONCE AS NEEDED
Status: DISCONTINUED | OUTPATIENT
Start: 2022-03-01 | End: 2022-03-01 | Stop reason: HOSPADM

## 2022-03-01 RX ORDER — SODIUM CHLORIDE 0.9 % (FLUSH) 0.9 %
10 SYRINGE (ML) INJECTION AS NEEDED
Status: DISCONTINUED | OUTPATIENT
Start: 2022-03-01 | End: 2022-03-01 | Stop reason: HOSPADM

## 2022-03-01 RX ORDER — ONDANSETRON 2 MG/ML
INJECTION INTRAMUSCULAR; INTRAVENOUS AS NEEDED
Status: DISCONTINUED | OUTPATIENT
Start: 2022-03-01 | End: 2022-03-01 | Stop reason: SURG

## 2022-03-01 RX ORDER — SODIUM CHLORIDE, SODIUM LACTATE, POTASSIUM CHLORIDE, CALCIUM CHLORIDE 600; 310; 30; 20 MG/100ML; MG/100ML; MG/100ML; MG/100ML
9 INJECTION, SOLUTION INTRAVENOUS CONTINUOUS
Status: DISCONTINUED | OUTPATIENT
Start: 2022-03-01 | End: 2022-03-01 | Stop reason: HOSPADM

## 2022-03-01 RX ORDER — FLUMAZENIL 0.1 MG/ML
0.2 INJECTION INTRAVENOUS AS NEEDED
Status: DISCONTINUED | OUTPATIENT
Start: 2022-03-01 | End: 2022-03-01 | Stop reason: HOSPADM

## 2022-03-01 RX ORDER — OXYCODONE AND ACETAMINOPHEN 10; 325 MG/1; MG/1
1 TABLET ORAL ONCE AS NEEDED
Status: COMPLETED | OUTPATIENT
Start: 2022-03-01 | End: 2022-03-01

## 2022-03-01 RX ORDER — HYDROCODONE BITARTRATE AND ACETAMINOPHEN 5; 325 MG/1; MG/1
1 TABLET ORAL EVERY 6 HOURS PRN
Qty: 15 TABLET | Refills: 0 | Status: SHIPPED | OUTPATIENT
Start: 2022-03-01 | End: 2022-03-15

## 2022-03-01 RX ORDER — PROPOFOL 10 MG/ML
VIAL (ML) INTRAVENOUS AS NEEDED
Status: DISCONTINUED | OUTPATIENT
Start: 2022-03-01 | End: 2022-03-01 | Stop reason: SURG

## 2022-03-01 RX ADMIN — SODIUM CHLORIDE, POTASSIUM CHLORIDE, SODIUM LACTATE AND CALCIUM CHLORIDE 100 ML/HR: 600; 310; 30; 20 INJECTION, SOLUTION INTRAVENOUS at 10:31

## 2022-03-01 RX ADMIN — PROPOFOL 130 MG: 10 INJECTION, EMULSION INTRAVENOUS at 13:13

## 2022-03-01 RX ADMIN — ONDANSETRON 4 MG: 2 INJECTION INTRAMUSCULAR; INTRAVENOUS at 13:40

## 2022-03-01 RX ADMIN — Medication 2 G: at 13:21

## 2022-03-01 RX ADMIN — DEXAMETHASONE SODIUM PHOSPHATE 4 MG: 4 INJECTION, SOLUTION INTRA-ARTICULAR; INTRALESIONAL; INTRAMUSCULAR; INTRAVENOUS; SOFT TISSUE at 13:40

## 2022-03-01 RX ADMIN — PROPOFOL 70 MG: 10 INJECTION, EMULSION INTRAVENOUS at 13:24

## 2022-03-01 RX ADMIN — FENTANYL CITRATE 100 MCG: 50 INJECTION, SOLUTION INTRAMUSCULAR; INTRAVENOUS at 13:13

## 2022-03-01 RX ADMIN — LIDOCAINE HYDROCHLORIDE 100 MG: 20 INJECTION, SOLUTION EPIDURAL; INFILTRATION; INTRACAUDAL; PERINEURAL at 13:13

## 2022-03-01 RX ADMIN — OXYCODONE AND ACETAMINOPHEN 1 TABLET: 325; 10 TABLET ORAL at 14:16

## 2022-03-01 NOTE — BRIEF OP NOTE
KNEE ARTHROSCOPY  Progress Note    Carlos A Boyer Jr.  3/1/2022    Pre-op Diagnosis:   S83.281D       Post-Op Diagnosis Codes:     * Old complex tear of lateral meniscus of right knee [M23.200]    Procedure/CPT® Codes:  CA KNEE SCOPE,MED/LAT MENISECTOMY [37856]      Procedure(s):  RIGHT KNEE PARTIAL LATERAL MENISCECTOMY    Surgeon(s):  Pedro Pablo Song MD    Anesthesia: General    Staff:   Circulator: Opal Downey RN  Scrub Person: Brit Lynn Tina M  Assistant: Gray Newell PA-C  Assistant: Gray Newell PA-C      Estimated Blood Loss: minimal    Urine Voided: * No values recorded between 3/1/2022  1:11 PM and 3/1/2022  1:43 PM *    Specimens:                None          Drains: * No LDAs found *    Findings: see op note     Complications: none        Pedro Pablo Song MD     Date: 3/1/2022  Time: 13:43 CST

## 2022-03-01 NOTE — ANESTHESIA PROCEDURE NOTES
Airway  Urgency: elective    Date/Time: 3/1/2022 1:16 PM  Airway not difficult    General Information and Staff    Patient location during procedure: OR  CRNA: Keanu Voss CRNA    Indications and Patient Condition  Indications for airway management: airway protection    Preoxygenated: yes  Mask difficulty assessment: 0 - not attempted    Final Airway Details  Final airway type: supraglottic airway      Successful airway: unique  Size 4  Airway Seal Pressure (cm H2O): 20    Number of attempts at approach: 1  Assessment: lips, teeth, and gum same as pre-op

## 2022-03-01 NOTE — ANESTHESIA POSTPROCEDURE EVALUATION
Patient: Carlos A Boyer Jr.    Procedure Summary     Date: 03/01/22 Room / Location:  [facility] OR  /  [facility] OR    Anesthesia Start: 1313 Anesthesia Stop: 1346    Procedure: RIGHT KNEE PARTIAL LATERAL MENISCECTOMY (Right Knee) Diagnosis:       Old complex tear of lateral meniscus of right knee      (S83.281D)    Surgeons: Pedro Pablo Song MD Provider: Keanu Voss CRNA    Anesthesia Type: general ASA Status: 3          Anesthesia Type: general    Vitals  Vitals Value Taken Time   /79 03/01/22 1416   Temp 98.6 °F (37 °C) 03/01/22 1413   Pulse 71 03/01/22 1417   Resp 12 03/01/22 1413   SpO2 94 % 03/01/22 1417   Vitals shown include unvalidated device data.        Post Anesthesia Care and Evaluation    PONV Status: none  Comments: Patient d/c from PACU prior to anes eval based on Shayla score.  Please see RN notes for details of d/c criteria.    Blood pressure 125/75, pulse 71, temperature 98.6 °F (37 °C), temperature source Temporal, resp. rate 18, SpO2 96 %.

## 2022-03-01 NOTE — ANESTHESIA PREPROCEDURE EVALUATION
Anesthesia Evaluation     Patient summary reviewed   history of anesthetic complications: PONV  NPO Solid Status: > 8 hours             Airway   Mallampati: II  TM distance: >3 FB  Neck ROM: full  Dental          Pulmonary    (+) sleep apnea,   (-) COPD, asthma, not a smoker  Cardiovascular   Exercise tolerance: good (4-7 METS)    ECG reviewed    (+) hypertension, CAD, CABG (2019), dysrhythmias Atrial Fib, hyperlipidemia,   (-) pacemaker, past MI, angina, CHF, cardiac stents      Neuro/Psych  (+) seizures well controlled, CVA,    (-) TIA  GI/Hepatic/Renal/Endo    (+) obesity,   diabetes mellitus using insulin,   (-) GERD, liver disease, no renal disease    Musculoskeletal     Abdominal    Substance History      OB/GYN          Other                      Anesthesia Plan    ASA 3     general     intravenous induction     Anesthetic plan, all risks, benefits, and alternatives have been provided, discussed and informed consent has been obtained with: patient.        CODE STATUS:

## 2022-03-01 NOTE — H&P
Pt Name: Carlos A Boyer Jr.  MRN: [id number]  YOB: 1960  Date of evaluation: 3/1/2022    H&P including current review of systems was updated in the paper chart and/or the document previously scanned into the record.  There have been no significant changes or new problems since the original evaluation.  The patient's problems continue and indications for contemplated procedure have not changed.    Electronically signed by Pedro Pablo Song MD on 3/1/2022 at 11:00 CST

## 2022-03-09 ENCOUNTER — LAB (OUTPATIENT)
Dept: LAB | Facility: HOSPITAL | Age: 62
End: 2022-03-09

## 2022-03-09 DIAGNOSIS — I50.32 CHRONIC DIASTOLIC CONGESTIVE HEART FAILURE: Chronic | ICD-10-CM

## 2022-03-09 LAB
ANION GAP SERPL CALCULATED.3IONS-SCNC: 10 MMOL/L (ref 5–15)
BUN SERPL-MCNC: 18 MG/DL (ref 8–23)
BUN/CREAT SERPL: 16.8 (ref 7–25)
CALCIUM SPEC-SCNC: 10.2 MG/DL (ref 8.6–10.5)
CHLORIDE SERPL-SCNC: 106 MMOL/L (ref 98–107)
CO2 SERPL-SCNC: 25 MMOL/L (ref 22–29)
CREAT SERPL-MCNC: 1.07 MG/DL (ref 0.76–1.27)
EGFRCR SERPLBLD CKD-EPI 2021: 78 ML/MIN/1.73
GLUCOSE SERPL-MCNC: 79 MG/DL (ref 65–99)
POTASSIUM SERPL-SCNC: 5 MMOL/L (ref 3.5–5.2)
SODIUM SERPL-SCNC: 141 MMOL/L (ref 136–145)

## 2022-03-09 PROCEDURE — 80048 BASIC METABOLIC PNL TOTAL CA: CPT

## 2022-03-09 PROCEDURE — 36415 COLL VENOUS BLD VENIPUNCTURE: CPT

## 2022-03-14 NOTE — PROGRESS NOTES
Chief Complaint  Congestive Heart Failure (4wk F/U. States has done well with increase. Feels to have more stamina. Edema has improved. ) and Results (Lab)    Subjective          Carlos A Boyer Jr. presents to [facility] CARDIOLOGY for routine follow-up of medication adjustments.  Entresto was increased to 97/103 mg twice daily following his last office visit on 2/15/2022.  Follow-up BMP on 3/1/2022 revealed normal creatinine and potassium.  He has chronic diastolic congestive heart failure, coronary artery disease status post coronary artery stenting and CABG x2 (LIMA to LAD and SVG to OM) 7/6/2019, paroxysmal atrial fibrillation status post cardioversion on chronic anticoagulation, hypertension, hyperlipidemia, type 2 diabetes mellitus, peripheral vascular disease, cerebrovascular accident, obstructive sleep apnea and obesity. He reports decrease in frequency and severity of chest pain since increasing Imdur to 120 mg. He reports improvement in bilateral lower extremity edema since starting Entresto. He reports intermittent palpitations. Patient denies shortness of breath, dizziness, syncope, orthopnea, PND or decreased stamina.  Patient denies any signs of bleeding.    Coronary Artery Disease  Presents for follow-up visit. Symptoms include chest pain, leg swelling and palpitations. Pertinent negatives include no chest pressure, chest tightness, dizziness, muscle weakness, shortness of breath or weight gain. Risk factors include hyperlipidemia and obesity. Risk factors do not include hypertension. His past medical history is significant for CHF. The symptoms have been stable. Compliance with diet is variable. Compliance with exercise is variable. Compliance with medications is good.   Atrial Fibrillation  Presents for follow-up visit. Symptoms include chest pain and palpitations. Symptoms are negative for an AICD problem, bradycardia, dizziness, hemodynamic instability, hypertension,  hypotension, pacemaker problem, shortness of breath, syncope, tachycardia and weakness. The symptoms have been stable. Past medical history includes atrial fibrillation, CAD, CHF and hyperlipidemia. There are no medication compliance problems.   Hypertension  This is a chronic problem. The current episode started more than 1 year ago. The problem is controlled. Associated symptoms include chest pain and palpitations. Pertinent negatives include no anxiety, blurred vision, headaches, malaise/fatigue, neck pain, orthopnea, peripheral edema, PND, shortness of breath or sweats. Risk factors for coronary artery disease include male gender, obesity, diabetes mellitus and dyslipidemia. Current antihypertension treatment includes ACE inhibitors, diuretics and direct vasodilators. The current treatment provides significant improvement. Hypertensive end-organ damage includes CAD/MI, CVA and PVD. Identifiable causes of hypertension include sleep apnea.   Hyperlipidemia  This is a chronic problem. The current episode started more than 1 year ago. Exacerbating diseases include diabetes and obesity. Associated symptoms include chest pain. Pertinent negatives include no shortness of breath. Current antihyperlipidemic treatment includes statins. Risk factors for coronary artery disease include male sex, obesity, hypertension, dyslipidemia and diabetes mellitus.   Congestive Heart Failure  Presents for follow-up visit. Associated symptoms include chest pain, edema and palpitations. Pertinent negatives include no abdominal pain, chest pressure, claudication, fatigue, muscle weakness, near-syncope, nocturia, orthopnea, paroxysmal nocturnal dyspnea, shortness of breath or unexpected weight change. The symptoms have been stable. His past medical history is significant for CAD. Compliance with total regimen is 51-75%. Compliance with diet is 51-75%. Compliance with exercise is 51-75%. Compliance with medications is %.  "      Objective   Vital Signs:   /82   Pulse 82   Ht 182.9 cm (72\")   Wt 115 kg (253 lb)   SpO2 98%   BMI 34.31 kg/m²     Vitals and nursing note reviewed.   Constitutional:       General: Awake.      Appearance: Normal and healthy appearance. Well-developed and not in distress. Obese.   Eyes:      General: Lids are normal.      Conjunctiva/sclera: Conjunctivae normal.      Pupils: Pupils are equal, round, and reactive to light.   HENT:      Head: Normocephalic and atraumatic.      Nose: Nose normal.   Neck:      Vascular: No JVR. JVD normal.   Pulmonary:      Effort: Pulmonary effort is normal.      Breath sounds: Examination of the right-upper field reveals decreased breath sounds. Examination of the left-upper field reveals decreased breath sounds. Examination of the right-middle field reveals decreased breath sounds. Examination of the left-middle field reveals decreased breath sounds. Examination of the right-lower field reveals decreased breath sounds. Examination of the left-lower field reveals decreased breath sounds. Decreased breath sounds present. No wheezing. No rhonchi. No rales.   Chest:      Chest wall: Not tender to palpatation.   Cardiovascular:      PMI at left midclavicular line. Normal rate. Regular rhythm. Normal S1. Normal S2.      Murmurs: There is a grade 2/6 harsh midsystolic murmur at the URSB, radiating to the neck. There is a grade 2/4 high frequency blowing decrescendo, early diastolic murmur at the URSB, radiating to the apex.      No gallop. No click. No rub.   Pulses:     Intact distal pulses.   Edema:     Peripheral edema absent.   Abdominal:      General: Bowel sounds are normal.      Palpations: Abdomen is soft.      Tenderness: There is no abdominal tenderness.   Musculoskeletal: Normal range of motion.         General: No tenderness.      Cervical back: Normal range of motion. Skin:     General: Skin is warm and dry.   Neurological:      General: No focal deficit " present.      Mental Status: Alert, oriented to person, place, and time and oriented to person, place and time.   Psychiatric:         Attention and Perception: Attention and perception normal.         Mood and Affect: Mood and affect normal.         Speech: Speech normal.         Behavior: Behavior normal. Behavior is cooperative.         Thought Content: Thought content normal.         Cognition and Memory: Cognition and memory normal.         Judgment: Judgment normal.        Result Review :   The following data was reviewed by: TEENA Buckner on 01/11/2022:  Common labs    Common Labsle 2/28/22 3/1/22 3/9/22   Glucose  202 (A) 79   BUN  19 18   Creatinine  0.87 1.07   Sodium  139 141   Potassium  4.7 5.0   Chloride  105 106   Calcium  9.5 10.2   WBC 4.86     Hemoglobin 14.8     Hematocrit 46.5     Platelets 116 (A)     (A) Abnormal value            Data reviewed: Cardiology studies 2d echo 4/29/21          Assessment and Plan    Diagnoses and all orders for this visit:    1. Chronic diastolic congestive heart failure (HCC) (Primary)-NYHA class II.  Compensated.  Reviewed signs and symptoms of CHF and what to report with the patient. Patient instructed to restrict sodium and weigh daily. Report weight gain of greater than 2 lbs overnight or 5 lbs in 1 week. Pt verbalized understanding of instructions and plan of care.     2. Coronary artery disease involving native coronary artery of native heart with stable angina pectoris (HCC)- Continue Imdur.    3. S/P CABG x 2-LIMA to LAD and SVG to OM 7/6/2019 per Dr. Steven Tang at [facility].  Patient continues on aspirin.  Denies bleeding.    4. Paroxysmal atrial fibrillation (HCC)- in sinus rhythm today.  Anticoagulated.    5. Chronic anticoagulation-continue Eliquis.  Patient does not bleeding.    6. Primary hypertension-blood pressure well controlled.  Monitor and record daily blood pressure. Report readings consistently higher than 130/90 or  consistently lower than 100/60.     7. Hyperlipidemia LDL goal <70-management per PCP.  Continue Crestor.    8. Bilateral carotid artery stenosis-patient follows with Dr. Chetan Echavarria with vascular surgery.    9. Type 2 diabetes mellitus with other circulatory complication, with long-term current use of insulin (HCC)-management per PCP.  Type 2 diabetes mellitus in the setting of obstructive coronary artery disease.    10. HOA on CPAP-patient reports compliance with CPAP.    11. Class 1 obesity due to excess calories with serious comorbidity and body mass index (BMI) of 34.0 to 34.9 in adult- Patient's Body mass index is 34.31 kg/m². indicating that he is obese (BMI >30). Obesity-related health conditions include the following: obstructive sleep apnea, hypertension, coronary heart disease, diabetes mellitus, dyslipidemias and peripheral vascular disease. Obesity is unchanged. BMI is is above average; no BMI management plan is appropriate. We discussed low calorie, low carb based diet program, portion control and increasing exercise.    12. Thoracic aortic aneurysm without rupture (HCC)-patient follows with Dr. Steven Tang with CT surgery for surveillance of 4.4 cm thoracic aortic aneurysm.      Follow Up   Return in about 3 months (around 6/15/2022) for Next scheduled follow up with Dr. Ramey.  Patient was given instructions and counseling regarding his condition or for health maintenance advice. Please see specific information pulled into the AVS if appropriate.

## 2022-03-14 NOTE — PROGRESS NOTES
"Subjective   Chief Complaint   Patient presents with   • Thoracic Aneurysm     Pt is here for follow up w/CT        Patient ID: Carlos A Boyer Jr. is a 61 y.o. male who is here for follow-up having had Urgent CABG- 2V (left internal mammary artery/left anterior descending and reverse saphenous vein graft/first obtuse marginal) with open left greater saphenous vein harvest performed on 7/6/2019    History of Present Illness    He returns for for follow up of lung nodules and thoracic aneurysm.        Non smoker since 1993  He denies unintended weight loss, hoarseness of voice, chest pain, hemoptysis, history of pneumonia, or cough.  No chest pain or shortness of breath.      No new medical events since last office visit other than noting he has had COVID-19.  He tolerated that quite well.      The following portions of the patient's history were reviewed and updated as appropriate: allergies, current medications, past family history, past medical history, past social history, past surgical history and problem list.        Objective   Visit Vitals  /72 (BP Location: Left arm, Patient Position: Sitting, Cuff Size: Adult)   Pulse 80   Ht 182.9 cm (72\")   Wt 115 kg (253 lb 3.2 oz)   SpO2 96%   BMI 34.34 kg/m²       Physical Exam   Constitutional: He is oriented to person, place, and time. He appears well-developed. No distress.   HENT:   Head: Normocephalic and atraumatic.   Eyes: Pupils are equal, round, and reactive to light.   Cardiovascular: Normal rate, regular rhythm and normal heart sounds. Exam reveals no friction rub.   No murmur heard.  Pulmonary/Chest: Effort normal and breath sounds normal. No respiratory distress. He has no wheezes. He has no rales.   The sternum is well healed.   Abdominal: Soft. He exhibits no distension. There is no abdominal tenderness. There is no guarding.   Musculoskeletal:      Comments:     Neurological: He is alert and oriented to person, place, and time. No cranial " nerve deficit.   Skin: Skin is warm and dry. No rash noted. He is not diaphoretic. No pallor.   Psychiatric: His behavior is normal.   Vitals reviewed.    Study Result     EXAMINATION: CT ANGIOGRAM CHEST- 2/25/2020 4:04 PM CST     HISTORY: Thoracic aortic aneurysm     COMPARISON: CTA chest 09/07/2019     DOSE: 381 mGy-cm     TECHNIQUE: Sequential imaging was performed from the thoracic inlet  through the upper abdomen following the administration of IV contrast.   Sagittal and coronal reformations were made from the original source  data and reviewed. Additionally, 3-D MIPS reconstructions of the vessels  were made per CTA protocol. Automated exposure control was also utilized  to decrease patient radiation dose.     FINDINGS:   The visualized thyroid gland is grossly normal in appearance. The  trachea and main bronchi appear widely patent and in normal anatomic  position. There is some thickening of the distal esophageal wall, which  may be related to a small hiatal hernia.     No pathologically enlarged axillary, mediastinal, or hilar lymph nodes  are identified. There has been interval decrease in size of mediastinal  and hilar lymph nodes.     The heart appears normal in size. There is no pericardial or pleural  effusion. Coronary artery calcifications are present. Post CABG changes  are noted. The ascending thoracic aorta measures up to 4.0 cm in  diameter. Main pulmonary artery is dilated, suggesting pulmonary  arterial hypertension. No filling defects are identified to suggest PE.     There is minimal atelectasis in the lingula. A tiny nodule is noted in  the lingula measuring 4 mm in size and (axial image 83), new from the  previous exam. There are dependent changes in the lung bases. A tiny  nodule is seen in the peripheral right upper lobe measuring 3-4 mm in  size (axial image 49), stable. A right lower lobe nodule is seen  measuring 4-5 mm in size (series axial image 61), also stable. A tiny  peripheral  nodule is seen in the right upper lobe. Additional tiny  nodules are noted throughout the right lung.     Review of the visualized portion of the upper abdomen demonstrates no  acute abnormalities.     Review of the visualized osseous structures demonstrates no acute or  aggressive lesions. Degenerative spurring is noted in the spine. There  has been prior median sternotomy.     IMPRESSION:  1. Dilation of the ascending thoracic aorta to 4.0 cm.  2. Dilation of the main pulmonary artery suggests pulmonary arterial  hypertension.  3. Atherosclerosis of the aorta and coronary arteries.  4. Tiny bilateral pulmonary nodules are favored to be postinflammatory  in nature. Consider follow-up CT in 12 months to document stability or  resolution.           This report was finalized on 02/25/2020 16:19 by Dr. Ramin Balderas MD.     Study Result    CT CHEST W CONTRAST DIAGNOSTIC- 2/12/2021 10:15 AM CST     HISTORY: lung nodule and aneurysm; I71.2-Thoracic aortic aneurysm,  without rupture; R91.8-Other nonspecific abnormal finding of lung field      COMPARISON: 10/21/2020     DOSE LENGTH PRODUCT: 466 mGy cm. Automated exposure control was also  utilized to decrease patient radiation dose.     TECHNIQUE: Axial images the chest are obtained following IV contrast.  Coronal and sagittal images reformatted.     FINDINGS:  No pathologic intrathoracic or axillary lymphadenopathy.  Prior mediastinal surgery likely coronary bypass. Diffuse coronary  calcifications and likely LAD stent. Heart upper limits of normal in  size. No pericardial or pleural effusions. The ascending thoracic aorta  measures 4.0 cm in maximum dimension descending thoracic aorta measuring  2.9 cm. No dissection.     Images the upper abdomen demonstrate hepatic steatosis. No adrenal  nodules. Cholecystectomy clips.     Stable 3 mm right upper lobe nodule on series 7 image 66. Stable 4 mm  nodule right middle lobe on image 86. Stable subpleural lateral 5 mm  right  lower lobe nodule image 108. Stable 3 mm left upper lobe nodule  image 56. Stable 4 mm left upper lobe nodule image 63. Stable 4 mm  lingular nodule image 85. Dependent atelectasis and or scarring. No  pneumothorax. No endobronchial lesions.     Moderate degenerative change of the thoracic spine with lower thoracic  hemangioma. Rotoscoliotic curvature of the thoracolumbar spine.     IMPRESSION:  1. No acute intrathoracic abnormality identified.  2. Stable ectasia ascending thoracic aorta to maximum measurement of 4  cm. No dissection. Prior coronary bypass surgery with LAD stent.  3. Scattered stable subcentimeter pulmonary nodules with no new  consolidation or nodularity. Dependent basilar atelectasis and/or  scarring.  4. Hepatic steatosis.  This report was finalized on 02/12/2021 12:12 by Dr. Ml Estrada MD.           Assessment/Plan       Diagnoses and all orders for this visit:    1. Thoracic aortic aneurysm without rupture (HCC) (Primary)    2. S/P CABG x 2    3. Lung nodules      Independent review of CT scan obtained on February 15, 2022 is performed by me.  The ascending aorta in greatest dimension in the short axis measures 4.3 cm in size.  Previously this measured 4 cm and 4 cm in reverse chronological order.  3 separate lung nodules are noted specifically a 5 mm right lower lobe, 4 mm within the fissure likely an intrafissural lymph node and a 3 cm right lower lobe lung nodule.  All of which are noncalcified.  These are stable.  No new lung nodules.     Overall, Carlos A Boyer Jr. is doing well.  From a lung nodule point of view no more further work-up is warranted.  He is a non-smoker now for 19 years.  We will plan to CAT scan 1 more time and then potentially consider MRI of the chest.  Also noted his bypass grafts are patent on this imaging.  We discussed signs /symptoms of acute aortic pathology including signs/symptoms of lung nodules.  We discussed the importance of exercise while being  mindful of a known aortic aneurysm.  It appears he has had some interval growth since last year.  We will need to monitor.  A CT scan in 1 year still looks overall appropriate.    Patient's Body mass index is 34.34 kg/m². BMI is above normal parameters. Recommendations include: educational material, referral to primary care and establishing durable lifestyle changes to accomplish an acceptable weight for age and height.    Carlos A Ceballos Rosalind Murdock. is a non-smoker and therefore does not need tobacco cessation education/counseling.      RTC 12 months.   Many thanks for the opportunity to care for your patient.    I will continue to keep you apprised of provided care as it ensues.

## 2022-03-15 ENCOUNTER — OFFICE VISIT (OUTPATIENT)
Dept: CARDIOLOGY | Facility: CLINIC | Age: 62
End: 2022-03-15

## 2022-03-15 VITALS
HEART RATE: 82 BPM | SYSTOLIC BLOOD PRESSURE: 108 MMHG | DIASTOLIC BLOOD PRESSURE: 82 MMHG | OXYGEN SATURATION: 98 % | BODY MASS INDEX: 34.27 KG/M2 | HEIGHT: 72 IN | WEIGHT: 253 LBS

## 2022-03-15 DIAGNOSIS — I25.118 CORONARY ARTERY DISEASE OF NATIVE ARTERY OF NATIVE HEART WITH STABLE ANGINA PECTORIS: Chronic | ICD-10-CM

## 2022-03-15 DIAGNOSIS — I10 PRIMARY HYPERTENSION: Chronic | ICD-10-CM

## 2022-03-15 DIAGNOSIS — G47.33 OSA ON CPAP: Chronic | ICD-10-CM

## 2022-03-15 DIAGNOSIS — Z95.1 S/P CABG X 2: Chronic | ICD-10-CM

## 2022-03-15 DIAGNOSIS — I48.0 PAROXYSMAL ATRIAL FIBRILLATION: Chronic | ICD-10-CM

## 2022-03-15 DIAGNOSIS — I50.32 CHRONIC DIASTOLIC CONGESTIVE HEART FAILURE: Primary | Chronic | ICD-10-CM

## 2022-03-15 DIAGNOSIS — Z99.89 OSA ON CPAP: Chronic | ICD-10-CM

## 2022-03-15 DIAGNOSIS — I71.20 THORACIC AORTIC ANEURYSM WITHOUT RUPTURE: ICD-10-CM

## 2022-03-15 DIAGNOSIS — Z79.4 TYPE 2 DIABETES MELLITUS WITH OTHER CIRCULATORY COMPLICATION, WITH LONG-TERM CURRENT USE OF INSULIN: Chronic | ICD-10-CM

## 2022-03-15 DIAGNOSIS — E11.59 TYPE 2 DIABETES MELLITUS WITH OTHER CIRCULATORY COMPLICATION, WITH LONG-TERM CURRENT USE OF INSULIN: Chronic | ICD-10-CM

## 2022-03-15 DIAGNOSIS — E66.09 CLASS 1 OBESITY DUE TO EXCESS CALORIES WITH SERIOUS COMORBIDITY AND BODY MASS INDEX (BMI) OF 34.0 TO 34.9 IN ADULT: ICD-10-CM

## 2022-03-15 DIAGNOSIS — Z79.01 CHRONIC ANTICOAGULATION: Chronic | ICD-10-CM

## 2022-03-15 DIAGNOSIS — I65.23 BILATERAL CAROTID ARTERY STENOSIS: Chronic | ICD-10-CM

## 2022-03-15 PROCEDURE — 99214 OFFICE O/P EST MOD 30 MIN: CPT | Performed by: NURSE PRACTITIONER

## 2022-03-15 RX ORDER — METOPROLOL TARTRATE 100 MG/1
100 TABLET ORAL 2 TIMES DAILY
COMMUNITY

## 2022-03-30 ENCOUNTER — APPOINTMENT (OUTPATIENT)
Dept: CT IMAGING | Facility: HOSPITAL | Age: 62
End: 2022-03-30

## 2022-03-30 ENCOUNTER — HOSPITAL ENCOUNTER (EMERGENCY)
Facility: HOSPITAL | Age: 62
Discharge: HOME | End: 2022-03-30
Attending: EMERGENCY MEDICINE | Admitting: EMERGENCY MEDICINE

## 2022-03-30 ENCOUNTER — APPOINTMENT (OUTPATIENT)
Dept: GENERAL RADIOLOGY | Facility: HOSPITAL | Age: 62
End: 2022-03-30

## 2022-03-30 VITALS
BODY MASS INDEX: 34.27 KG/M2 | SYSTOLIC BLOOD PRESSURE: 146 MMHG | DIASTOLIC BLOOD PRESSURE: 67 MMHG | WEIGHT: 253 LBS | HEIGHT: 72 IN | RESPIRATION RATE: 20 BRPM | HEART RATE: 67 BPM | TEMPERATURE: 98 F | OXYGEN SATURATION: 98 %

## 2022-03-30 DIAGNOSIS — S50.11XA CONTUSION OF RIGHT FOREARM, INITIAL ENCOUNTER: ICD-10-CM

## 2022-03-30 DIAGNOSIS — S30.0XXA CONTUSION OF SACRUM, INITIAL ENCOUNTER: ICD-10-CM
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
## 2022-03-30 DIAGNOSIS — S00.03XA CONTUSION OF SCALP, INITIAL ENCOUNTER: ICD-10-CM

## 2022-03-30 LAB
ANION GAP SERPL CALCULATED.3IONS-SCNC: 10 MMOL/L (ref 5–15)
BASOPHILS # BLD AUTO: 0.02 10*3/MM3 (ref 0–0.2)
BASOPHILS NFR BLD AUTO: 0.4 % (ref 0–1.5)
BUN SERPL-MCNC: 19 MG/DL (ref 8–23)
BUN/CREAT SERPL: 15.8 (ref 7–25)
CALCIUM SPEC-SCNC: 9.2 MG/DL (ref 8.6–10.5)
CHLORIDE SERPL-SCNC: 107 MMOL/L (ref 98–107)
CO2 SERPL-SCNC: 24 MMOL/L (ref 22–29)
CREAT SERPL-MCNC: 1.2 MG/DL (ref 0.76–1.27)
DEPRECATED RDW RBC AUTO: 45.4 FL (ref 37–54)
EGFRCR SERPLBLD CKD-EPI 2021: 67.5 ML/MIN/1.73
EOSINOPHIL # BLD AUTO: 0.17 10*3/MM3 (ref 0–0.4)
EOSINOPHIL NFR BLD AUTO: 3.1 % (ref 0.3–6.2)
ERYTHROCYTE [DISTWIDTH] IN BLOOD BY AUTOMATED COUNT: 13.4 % (ref 12.3–15.4)
GLUCOSE SERPL-MCNC: 196 MG/DL (ref 65–99)
HCT VFR BLD AUTO: 43 % (ref 37.5–51)
HGB BLD-MCNC: 13.8 G/DL (ref 13–17.7)
LYMPHOCYTES # BLD AUTO: 1.5 10*3/MM3 (ref 0.7–3.1)
LYMPHOCYTES NFR BLD AUTO: 27.5 % (ref 19.6–45.3)
MCH RBC QN AUTO: 29.5 PG (ref 26.6–33)
MCHC RBC AUTO-ENTMCNC: 32.1 G/DL (ref 31.5–35.7)
MCV RBC AUTO: 91.9 FL (ref 79–97)
MONOCYTES # BLD AUTO: 0.44 10*3/MM3 (ref 0.1–0.9)
MONOCYTES NFR BLD AUTO: 8.1 % (ref 5–12)
NEUTROPHILS NFR BLD AUTO: 3.3 10*3/MM3 (ref 1.7–7)
NEUTROPHILS NFR BLD AUTO: 60.5 % (ref 42.7–76)
PLATELET # BLD AUTO: 115 10*3/MM3 (ref 140–450)
PMV BLD AUTO: 11.3 FL (ref 6–12)
POTASSIUM SERPL-SCNC: 4.8 MMOL/L (ref 3.5–5.2)
RBC # BLD AUTO: 4.68 10*6/MM3 (ref 4.14–5.8)
SODIUM SERPL-SCNC: 141 MMOL/L (ref 136–145)
WBC NRBC COR # BLD: 5.45 10*3/MM3 (ref 3.4–10.8)

## 2022-03-30 PROCEDURE — 99283 EMERGENCY DEPT VISIT LOW MDM: CPT

## 2022-03-30 PROCEDURE — 72125 CT NECK SPINE W/O DYE: CPT

## 2022-03-30 PROCEDURE — 85025 COMPLETE CBC W/AUTO DIFF WBC: CPT | Performed by: EMERGENCY MEDICINE

## 2022-03-30 PROCEDURE — 70450 CT HEAD/BRAIN W/O DYE: CPT

## 2022-03-30 PROCEDURE — 73090 X-RAY EXAM OF FOREARM: CPT

## 2022-03-30 PROCEDURE — 73110 X-RAY EXAM OF WRIST: CPT

## 2022-03-30 PROCEDURE — 80048 BASIC METABOLIC PNL TOTAL CA: CPT | Performed by: EMERGENCY MEDICINE

## 2022-03-30 PROCEDURE — 72192 CT PELVIS W/O DYE: CPT

## 2022-06-01 ENCOUNTER — TELEPHONE (OUTPATIENT)
Dept: CARDIOLOGY | Facility: CLINIC | Age: 62
End: 2022-06-01

## 2022-06-01 NOTE — TELEPHONE ENCOUNTER
[facility] REQUESTING CARDIAC CLEARANCE FOR PT TO HOLD ASA 2 WEEKS PRIOR/2 DAYS POST OP  &  ELIQUIS 3 DAYS PRIOR/2 DAYS POST OP     PT IS UNDERGOING AN IN OFFICE SURGERY WITH SAME DAY RECONSTRUCTION ON 6/13/22    CAD/AFIB/CHF - LOV 3/15/2022    FORM TO BE SIGNED - SCANNED TO CHART    PLEASE ADVISE

## 2022-06-15 ENCOUNTER — OFFICE VISIT (OUTPATIENT)
Dept: CARDIOLOGY | Facility: CLINIC | Age: 62
End: 2022-06-15

## 2022-06-15 VITALS
HEIGHT: 72 IN | HEART RATE: 87 BPM | BODY MASS INDEX: 34.81 KG/M2 | DIASTOLIC BLOOD PRESSURE: 60 MMHG | WEIGHT: 257 LBS | SYSTOLIC BLOOD PRESSURE: 115 MMHG | OXYGEN SATURATION: 98 %

## 2022-06-15 DIAGNOSIS — E11.59 TYPE 2 DIABETES MELLITUS WITH OTHER CIRCULATORY COMPLICATION, WITH LONG-TERM CURRENT USE OF INSULIN: Primary | Chronic | ICD-10-CM

## 2022-06-15 DIAGNOSIS — I48.92 ATRIAL FLUTTER, UNSPECIFIED TYPE: ICD-10-CM

## 2022-06-15 DIAGNOSIS — I25.118 CORONARY ARTERY DISEASE OF NATIVE ARTERY OF NATIVE HEART WITH STABLE ANGINA PECTORIS: Chronic | ICD-10-CM

## 2022-06-15 DIAGNOSIS — Z99.89 OSA ON CPAP: Chronic | ICD-10-CM

## 2022-06-15 DIAGNOSIS — I71.20 THORACIC AORTIC ANEURYSM WITHOUT RUPTURE: ICD-10-CM

## 2022-06-15 DIAGNOSIS — K21.9 GASTROESOPHAGEAL REFLUX DISEASE WITHOUT ESOPHAGITIS: Chronic | ICD-10-CM

## 2022-06-15 DIAGNOSIS — Z79.4 TYPE 2 DIABETES MELLITUS WITH OTHER CIRCULATORY COMPLICATION, WITH LONG-TERM CURRENT USE OF INSULIN: Primary | Chronic | ICD-10-CM

## 2022-06-15 DIAGNOSIS — Z95.1 S/P CABG X 2: Chronic | ICD-10-CM

## 2022-06-15 DIAGNOSIS — G47.33 OSA ON CPAP: Chronic | ICD-10-CM

## 2022-06-15 DIAGNOSIS — E11.42 DIABETIC PERIPHERAL NEUROPATHY: ICD-10-CM

## 2022-06-15 DIAGNOSIS — E78.5 HYPERLIPIDEMIA LDL GOAL <70: Chronic | ICD-10-CM

## 2022-06-15 DIAGNOSIS — I10 PRIMARY HYPERTENSION: Chronic | ICD-10-CM

## 2022-06-15 DIAGNOSIS — I50.32 CHRONIC DIASTOLIC CONGESTIVE HEART FAILURE: Chronic | ICD-10-CM

## 2022-06-15 PROCEDURE — 99214 OFFICE O/P EST MOD 30 MIN: CPT | Performed by: INTERNAL MEDICINE

## 2022-06-15 NOTE — PROGRESS NOTES
Carlos A Boyer Jr.  [id number]  1960  62 y.o.  male           Referring Provider: Vinayak Correia PA    Reason for Follow-up Visit:       Here for routine follow up   Prior ER visit for Paroxysmal atrial fibrillation with with rapid ventricular response    CARLOS guided cardioversion, on anticoagulation with Eliquis  coronary artery disease stented coronary artery   Type 2 diabetes mellitus     S/p CABG x 2 Dr Tang 7/2019  S/P  right rotator cuff surgery Dr Divina Michelle [facility]   myocardial perfusion scan as below  Coronary CT angiography report as below    Here for routine follow up today        Subjective    Mild chronic exertional shortness of breath on exertion relieved with rest  No significant cough or wheezing    No palpitations  No associated chest pain  No significant pedal edema    No fever or chills  No significant expectoration    No hemoptysis  No presyncope or syncope    Tolerating current medications well with no untoward side effects   Compliant with prescribed medication regimen. Tries to adhere to cardiac diet.     No bleeding, excessive bruising, gait instability or fall risks    BP well controlled at home.     Blood sugars well controlled at home     Overall feels well and fairly active   No new events or complaints since last visit     BNP as below           History of present illness:  Carlos A Boyer Jr. is a 62 y.o. yo male with Type 2 diabetes mellitus    essential hypertension     chest pain who presents today for   Chief Complaint   Patient presents with   • Congestive Heart Failure   .    History  Past Medical History:   Diagnosis Date   • Arthritis    • Asthma    • Cancer (HCC)    • Carotid disease, bilateral (HCC)    • Chest pain    • Chronic diastolic congestive heart failure (HCC) 9/7/2019   • Chronic sinusitis    • Maribell bullosa    • Coronary artery disease involving native coronary artery of native heart with unstable angina pectoris (HCC) 1/17/2017   • Deviated  septum    • Diabetes mellitus (HCC)    • Difficulty urinating    • Diverticulitis    • Enlarged prostate    • Fatty liver    • GERD (gastroesophageal reflux disease)    • Hyperlipidemia LDL goal <70 2/2/2017   • Hypertrophy of nasal turbinates    • Keratoderma    • Kidney stone    • Migraine    • Murmur, heart    • Myocardial infarction (HCC)    • Obesity    • Paroxysmal atrial fibrillation (HCC) 7/11/2019   • PONV (postoperative nausea and vomiting)    • Primary hypertension 10/16/2016   • Psoriasis    • Seizures (HCC)    • Sinus congestion    • Skin cancer    • Sleep apnea    • SOB (shortness of breath)    • Stroke (HCC)    • UTI (urinary tract infection)    ,   Past Surgical History:   Procedure Laterality Date   • CARDIAC CATHETERIZATION  01/2016    Dr. Broadbent; widely patent previously placed stents in the left anterior descending and obstructive disease involving the diagonal branch which was treated medically   • CARDIAC CATHETERIZATION N/A 7/14/2017    Procedure: Left Heart Cath;  Surgeon: Wade Ramey MD;  Location:  [facility] CATH INVASIVE LOCATION;  Service:    • CARDIAC CATHETERIZATION Left 10/15/2018    Procedure: Cardiac Catheterization/Vascular Study;  Surgeon: Wade Ramey MD;  Location:  [facility] CATH INVASIVE LOCATION;  Service: Cardiology   • CARDIAC CATHETERIZATION  10/15/2018    Procedure: Functional Flow Marcy;  Surgeon: Wade Ramey MD;  Location:  [facility] CATH INVASIVE LOC[facility];  Service: Cardiology   • CARDIAC CATHETERIZATION N/A 10/15/2018    Procedure: Left ventriculography;  Surgeon: Wade Ramey MD;  Location:  [facility] CATH INVASIVE LOCATION;  Service: Cardiology   • CARDIAC CATHETERIZATION Left 6/26/2019    Procedure: Cardiac Catheterization/Vascular Study VEL OK  HE WILL WAIT 1 YEAR FOR SHOULDER SURGERY ;  Surgeon: Wade Ramey MD;  Location:  [facility] CATH INVASIVE LOCATION;  Service: Cardiology   • CARDIAC CATHETERIZATION Left 4/30/2021    Procedure: Coronary angiography;  Surgeon: Farhad  Sahil CARBAJAL MD;  Location: [facility] CATH INVASIVE LOCATION;  Service: Cardiology;  Laterality: Left;   • CARDIAC CATHETERIZATION N/A 4/30/2021    Procedure: Percutaneous Coronary Intervention;  Surgeon: Sahli Llamas MD;  Location: [facility] CATH INVASIVE LOCATION;  Service: Cardiology;  Laterality: N/A;   • CHOLECYSTECTOMY     • CHOLECYSTECTOMY WITH INTRAOPERATIVE CHOLANGIOGRAM N/A 8/1/2018    Procedure: CHOLECYSTECTOMY LAPAROSCOPIC INTRAOPERATIVE CHOLANGIOGRAM;  Surgeon: Shane Ann MD;  Location: [facility] OR;  Service: General   • COLONOSCOPY N/A 7/14/2020    Procedure: COLONOSCOPY WITH ANESTHESIA;  Surgeon: Anupam Morales DO;  Location: [facility] ENDOSCOPY;  Service: Gastroenterology;  Laterality: N/A;  pre: abdominal pain  post: diverticulosis  Vinayak Correia PA   • CORONARY ANGIOPLASTY     • CORONARY ARTERY BYPASS GRAFT N/A 7/6/2019    Procedure: CABG X2 WITH LIMA, LEFT LEG OVH, AND PLACEMENT OF LEFT FEMORAL ARTERIAL LINE;  Surgeon: Steven Tang MD;  Location: [facility] OR;  Service: Cardiothoracic   • CORONARY STENT PLACEMENT      x 6   • ENDOSCOPIC FUNCTIONAL SINUS SURGERY (FESS) Bilateral 12/13/2017    Procedure: PROCEDURE PERFORMED:  Bilateral functional endoscopic anterior ethmoidectomy with bilateral middle meatal antrostomy Septoplasty Right kathia bullosa resection Bilateral inferior turbinate reduction via Coblation;  Surgeon: Mayank Ibarra MD;  Location: [facility] OR;  Service:    • ENDOSCOPY N/A 7/30/2018    Procedure: ESOPHAGOGASTRODUODENOSCOPY WITH ANESTHESIA;  Surgeon: Benitez Mas MD;  Location: [facility] ENDOSCOPY;  Service: Gastroenterology   • ENDOSCOPY N/A 7/14/2020    Procedure: ESOPHAGOGASTRODUODENOSCOPY WITH ANESTHESIA;  Surgeon: Anupam Morales DO;  Location: [facility] ENDOSCOPY;  Service: Gastroenterology;  Laterality: N/A;  pre: abdominal pain  post: esophagitis  Vinayak Correia PA   • HERNIA REPAIR      x2 inguinal area   • KIDNEY STONE SURGERY     • KNEE  ARTHROSCOPY Right 3/1/2022    Procedure: RIGHT KNEE PARTIAL LATERAL MENISCECTOMY;  Surgeon: Pedro Pablo Song MD;  Location: [facility] OR;  Service: Orthopedics;  Laterality: Right;   • KNEE SURGERY Right    • OTHER SURGICAL HISTORY      urolift   • PROSTATE SURGERY      Dr. Badillo -    • ROTATOR CUFF REPAIR     • THUMB AMPUTATION Left     partial   • TOE AMPUTATION Right     big   ,   Family History   Problem Relation Age of Onset   • Heart disease Father    • COPD Mother    • Hypertension Mother    • Asthma Mother    • No Known Problems Sister    • Colon cancer Paternal Uncle    • Prostate cancer Maternal Grandfather    • No Known Problems Sister    • Colon cancer Maternal Grandmother    • Colon polyps Maternal Grandmother    ,   Social History     Tobacco Use   • Smoking status: Former Smoker     Packs/day: 1.50     Years: 4.50     Pack years: 6.75     Types: Cigarettes, Cigars     Quit date:      Years since quittin.4   • Smokeless tobacco: Former User     Types: Chew     Quit date:    Vaping Use   • Vaping Use: Never used   Substance Use Topics   • Alcohol use: No   • Drug use: No   ,     Medications  Current Outpatient Medications   Medication Sig Dispense Refill   • albuterol (PROVENTIL HFA;VENTOLIN HFA) 108 (90 BASE) MCG/ACT inhaler Inhale 2 puffs Every 6 (Six) Hours As Needed for wheezing.     • apixaban (ELIQUIS) 5 MG tablet tablet Take 1 tablet by mouth Every 12 (Twelve) Hours. 180 tablet 3   • aspirin 81 MG chewable tablet Chew 81 mg Daily.     • calcium polycarbophil (FIBERCON) 625 MG tablet Take 625 mg by mouth As Needed.     • carboxymethylcellulose (REFRESH PLUS) 0.5 % solution Administer 1 drop to both eyes 4 (Four) Times a Day As Needed for Dry Eyes.     • empagliflozin (JARDIANCE) 10 MG tablet tablet Take 2 tablets by mouth Daily. 20 mg total     • fenofibrate (Tricor) 145 MG tablet Take 1 tablet by mouth Daily. 30 tablet 2   • fluticasone (FLONASE) 50 MCG/ACT nasal spray 2  sprays into each nostril Daily.     • gabapentin (Neurontin) 300 MG capsule Take 1 capsule by mouth Every Night. 90 capsule 0   • insulin aspart (novoLOG FLEXPEN) 100 UNIT/ML solution pen-injector sc pen Inject 40 Units under the skin into the appropriate area as directed Every Morning. 40 units 3 times daily     • insulin detemir (LEVEMIR) 100 UNIT/ML injection Inject 50 Units under the skin into the appropriate area as directed Daily. 90 units twice  units PM     • isosorbide mononitrate (IMDUR) 120 MG 24 hr tablet Take 1 tablet by mouth Daily. 90 tablet 3   • lamoTRIgine (LaMICtal) 200 MG tablet Take 1 tablet by mouth 2 (Two) Times a Day. 180 tablet 1   • levETIRAcetam (KEPPRA) 500 MG tablet Take 3 tablets every morning, take 4 tablets every night. 630 tablet 1   • Liraglutide (VICTOZA SC) Inject 1.2 mg under the skin into the appropriate area as directed Every Night.     • metFORMIN (GLUCOPHAGE) 1000 MG tablet Take 1 tablet by mouth 2 (Two) Times a Day With Meals. HOLD x 48 hours post contrast. May resume 3/18/18     • metoprolol tartrate (LOPRESSOR) 100 MG tablet Take 100 mg by mouth 2 (Two) Times a Day.     • Multiple Vitamin (MULTI VITAMIN PO) Take 1 tablet by mouth Daily.     • nitroglycerin (NITROSTAT) 0.4 MG SL tablet Place 0.4 mg under the tongue Every 5 (Five) Minutes As Needed for Chest Pain. Take no more than 3 doses in 15 minutes.     • pantoprazole (PROTONIX) 40 MG EC tablet Take 40 mg by mouth 2 (Two) Times a Day.     • psyllium (METAMUCIL) 58.6 % packet Take 1 packet by mouth Daily As Needed (constipation).     • rosuvastatin (CRESTOR) 20 MG tablet Take 20 mg by mouth Every Night.     • sacubitril-valsartan (Entresto)  MG tablet Take 1 tablet by mouth 2 (Two) Times a Day. 180 tablet 3   • tamsulosin (FLOMAX) 0.4 MG capsule 24 hr capsule Take 1 capsule by mouth Daily.       No current facility-administered medications for this visit.       Allergies:  Flagyl [metronidazole],  "Atorvastatin, and Ciprofloxacin    Review of Systems  Review of Systems   Constitutional: Positive for malaise/fatigue.   HENT: Negative.    Eyes: Negative.    Cardiovascular: Positive for dyspnea on exertion. Negative for chest pain, claudication, cyanosis, irregular heartbeat, leg swelling, near-syncope, orthopnea, palpitations, paroxysmal nocturnal dyspnea and syncope.   Respiratory: Negative.    Endocrine: Negative.    Hematologic/Lymphatic: Negative.    Skin: Negative.    Musculoskeletal: Positive for arthritis and back pain.   Gastrointestinal: Negative for anorexia.   Genitourinary: Negative.    Neurological: Positive for dizziness, light-headedness and weakness.   Psychiatric/Behavioral: Negative.        Objective     Physical Exam:      Vitals:    06/15/22 0939   BP: 115/60   Pulse: 87   SpO2: 98%   Weight: 117 kg (257 lb)   Height: 182.9 cm (72\")         Physical Exam   Constitutional: He appears well-developed.   HENT:   Head: Normocephalic.   Neck: Normal carotid pulses and no JVD present. No tracheal tenderness present. Carotid bruit is not present. No tracheal deviation present.   Cardiovascular: Regular rhythm, normal heart sounds and normal pulses.   Pulmonary/Chest: Effort normal. No stridor.   Abdominal: Soft. He exhibits no distension. There is no abdominal tenderness.   Neurological: He is alert. No cranial nerve deficit or sensory deficit.   Skin: Skin is warm.   Psychiatric: His speech is normal and behavior is normal.       Results Review:    FINDINGS:    Pulmonary arteries: There is adequate enhancement of the pulmonary  arteries to evaluate for central and segmental pulmonary emboli. There  are no filling defects within the main, lobar, segmental or visualized  subsegmental pulmonary arteries. The arteries are prominent mild  pulmonary arterial hypertension may be present..      Aorta and great vessels: The ascending aorta is 4 cm in diameter. No  evidence of dissection.. The great vessels " are normal in appearance.     Visualized neck base: The imaged portion of the base of the neck appears  unremarkable.      Lungs: The lungs are clear. There is no mass, worrisome nodule, or  consolidation. No pleural effusion is seen. The trachea and bronchial  tree are patent.      Heart: Cardiac silhouette is mildly enlarged. Vascular calcifications  noted in the coronary arteries. The stent in the LAD is present. Wires  are present from previous median sternotomy. There is no pericardial  effusion.      Mediastinum and lymph nodes: No enlarged mediastinal, hilar, or axillary  lymph nodes are present.      Skeletal and soft tissues: The osseous structures of the thorax and  surrounding soft tissues demonstrate no acute process.     Upper abdomen: The imaged portion of the upper abdomen demonstrates no  acute process.      IMPRESSION:  1. No evidence of embolic disease  2. Cardiomegaly and coronary artery calcification. With a stent in the  LAD.         Results for orders placed during the hospital encounter of 04/28/21    Adult Transthoracic Echo Complete W/ Cont if Necessary Per Protocol    Interpretation Summary  · Left ventricular ejection fraction appears to be 56 - 60%. Left ventricular systolic function is normal. There appears to be mild hypokinesis of the apex and distal septal and anterior walls.  · Left ventricular wall thickness is consistent with mild to moderate concentric hypertrophy.  · Left ventricular diastolic function is consistent with (grade II w/high LAP) pseudonormalization.  · Mild aortic valve stenosis is present.  · Normal size and function of the right ventricle.         Impression:  1.  Native two-vessel coronary artery disease, including chronic total occlusion of the mid-LAD and moderate to severe stenosis of the ostium of OM1.  Both bypass grafts (LIMA to LAD and SVG to OM1) are widely patent.  No changes in native anatomy compared to pre-CABG films from 2019 (with the exception of  mid-LAD, which is now chronically and totally occluded, which is expected in the presence of the LIMA, which is now responsible for filling the remainder of the mid to distal LAD).  2.  No culprit stenosis seen for current presentation.  3.  Normal LVEF  4.  Elevated LVEDP.     Plan:   1.  2 hours bedrest  2.  Continue aspirin 81mg daily, and can resume Eliquis with p.m. dose on 5/13.    3.  Increase Imdur to 60 mg daily for improved antianginal benefit  4.  Increase lisinopril dose to 40 mg daily, as some of his symptoms may be attributed to poorly controlled hypertension  5.  Results communicated with primary hospital attending, Dr. Beckman  6.  Follow-up with primary cardiologist, Dr. Ramey, as planned in July, or sooner with persistent/worsening symptoms     Electronically signed by Sahil Llamas MD, 04/30/21, 9:38 AM CDT.       Cardiac CT angiography 9/16/2020     Impression:      1. Total calcium score : 3345.6  2.  Patent left internal mammary artery graft.  Severe stenosis of the mid left anterior descending coronary artery where there are earlier implanted stents  3.  Suspected more than 50% stenosis of the left anterior descending coronary artery after touchdown site of the left internal mammary artery graft  4.  Severe stenosis of proximal first obtuse marginal branch  5.   Patent saphenous venous graft to the first obtuse marginal branch with suspected more than 50% stenosis at the touchdown site.  Significant coronary calcification preventing good visualization of the anastomotic site.  6.   50 to 60% stenosis of the right posterior descending coronary artery        ___________________________________________________________________________     Recommendations:     Ongoing risk factor modifications  Further evaluation if ongoing chest pain or high risk of suspicion of significant ischemic heart disease          Carlos A Boyer Jr.   Regadenoson Stress Test With Myocardial Perfusion SPECT (Multi Study)    Order# [id number]   Reading physician: Wade Ramey MD Ordering physician: Wade Ramey MD Study date: 20   Patient Information     Patient Name  Carlos A Boyer Jr. MRN  [id number] Sex  Male  (Age)  1960 (60 y.o.)   Interpretation Summary     · Left ventricular ejection fraction is normal (Calculated EF = 54%).  · Myocardial perfusion imaging indicates a medium-sized infarct located in the anterior wall and apex with no significant ischemia noted.  · Diaphragmatic attenuation and GI artifacts are present.  · Raw images reviewed with the following abnormalities noted: vertical motion.           Conclusion of cardiac catheterization     Left main coronary arteries normal  Left anterior descending coronary artery has patent stents  The distal edge of the stent in the mid left anterior descending coronary artery has approximately 60% stenosis  Highly abnormal fractional flow reserve of this at 0.78 without adenosine stress.  PTCA done of the segment.  Despite multiple attempts and use of guide liner could not advance the stent due to earlier implanted stents.  Left circumflex coronary artery is codominant and large  The proximal portion of the first obtuse marginal branch has a 60% stenosis with normal fractional flow reserve above 0.85.  Right coronary artery is large and codominant without any high-grade lesions.     Left ventricular end-diastolic pressure is mildly elevated to 15 mmHg.  No gradient across aortic valve on pullback.  Left ventriculography shows a hyperdynamic left ventricle with ejection fraction of 75%.     Percutaneous coronary intervention     XB 3.5 guide advised used for fractional flow reserve of the obtuse marginal branch as well as of the left anterior descending coronary artery  Then we did try to advance a 2.5 mm stent.  We used 2 different size stents and could not cross  We used a guide liner and does not did not have placed cross across the stent.  We then did balloon  angioplasty using 2.0 mm balloon.  Stenosis was reduced from 60% down to less than 10%.  BEVERLEY-3 flow before and after procedure.           ____________________________________________________________________________________________________________________________________________     Plan after cardiac catheterization        Dual antiplatelet therapy minimum of 1 year preferably longer  Statin ACE inhibitors beta-blockers  If there is repeat restenosis of the left anterior descending coronary artery would consider surgical revascularization.  Target LDL less than 70 mg/dL.  Maximize antianginal therapy.  Intensive risk factor modifications for both primary and secondary prevention if applicable  Hydration   Observation     Results for orders placed during the hospital encounter of 04/28/21    Adult Transthoracic Echo Complete W/ Cont if Necessary Per Protocol    Interpretation Summary  · Left ventricular ejection fraction appears to be 56 - 60%. Left ventricular systolic function is normal. There appears to be mild hypokinesis of the apex and distal septal and anterior walls.  · Left ventricular wall thickness is consistent with mild to moderate concentric hypertrophy.  · Left ventricular diastolic function is consistent with (grade II w/high LAP) pseudonormalization.  · Mild aortic valve stenosis is present.  · Normal size and function of the right ventricle.  10/15/18        Conclusion     The left main coronary artery is normal  Left anterior descending coronary artery and diagonal branches are patent with a widely patent stent noted in the  left anterior descending coronary artery    Mid left anterior descending coronary artery is a 50% stenosis and hemodynamically not significant.  Left circumflex coronary artery is large with a first obtuse marginal around 40-60% stenosis.  Fractional flow reserve of this is completely normal and at above 0.9.  Left circumflex coronary artery is a codominant.  Right coronary  artery is codominant large with distal small vessel disease.     Left ventricular end-diastolic pressure is normal at 11 mmHg no gradient across aortic valve on pullback  Left ventriculography shows ejection fraction of 55%.           Plan     Continue current medication  Symptomatic treatment for small vessel disease with antianginals  If stable can be discharged home later today  Intensive risk factor modifications for both primary and secondary prevention if applicable  Hydration   Observation         7/17 cath  Conclusion  Nonocclusive epicardial coronary arteries with widely patent stents in the  left anterior descending coronary artery   artery and left circumflex coronary artery.  Hyperdynamic left ventricle with ejection fraction of 75%.  LVEDP   17    mm Hg     Plan  Workup for noncardiac causes of chest pain this can be done as outpatient.  His d-dimer is within normal range  After a period of observation of stable can be discharged either later today.  Keep follow-up appointment with me  Ongoing risk factor modifications keep LDL below 70 mg/dL  Hydration   Observation    ____________________________________________________________________________________________________________________________________________  Health maintenance and recommendations    Low salt/ HTN/ Heart healthy carbohydrate restricted cardiac diet (printed dietary and general instructions provided for home review )  The patient is advised to reduce or avoid caffeine or other cardiac stimulants.     The patient was advised to avoid long term NSAIDS , use Tylenol PRN instead  Avoid cardiac stimulants including common drugs like Pseudoephedrine or excessive amounts of caffeine  Monitor for any signs of bleeding including red or dark stools. Fall precautions.   Advised staying uptodate with immunizations per established standard guidelines.  Offered to give patient  a copy      Questions were encouraged, asked and answered to the patient's   understanding and satisfaction. Questions if any regarding current medications and side effects, need for refills and importance of compliance to medications stressed.    Reviewed available prior notes, consults, prior visits, laboratory findings, radiology and cardiology relevant reports. Updated chart as applicable. I have reviewed the patient's medical history in detail and updated the computerized patient record as relevant.      Updated patient regarding any new or relevant abnormalities on review of records or any new findings on physical exam. Mentioned to patient about purpose of visit and desirable health short and long term goals and objectives.    Primary to monitor CBC CMP Lipid panel and TSH as applicable    ___________________________________________________________________________________________________________________________________________         Procedures     Diagnoses and all orders for this visit:    1. Type 2 diabetes mellitus with other circulatory complication, with long-term current use of insulin (HCC) (Primary)    2. Thoracic aortic aneurysm without rupture (HCC)    3. S/P CABG x 2    4. Primary hypertension    5. HOA on CPAP    6. Hyperlipidemia LDL goal <70    7. Gastroesophageal reflux disease without esophagitis    8. Diabetic peripheral neuropathy (HCC)    9. Coronary artery disease of native artery of native heart with stable angina pectoris (HCC)    10. Chronic diastolic congestive heart failure (HCC)    11. Atrial flutter, unspecified type (HCC)            Plan    The current medical regimen is effective;  continue present plan and medications.     Overall doing well no new cardiovascular symptoms and therefore no additional cardiac testing is required prior to next visit  If any interim issues arise will call me for further evaluation.     Check BP and heart rates twice daily initially till blood pressures and heart rates under good control and then at least 3x / week,   If  blood pressures continue to be well-controlled then can check week a month  at home and bring a recording for review next visit  If BP >130/85 or < 100/60 persistently over 3 reading 30 mins apart or if heart rates persistently above 100 bpm or less than 55 bpm call sooner for evaluation and advise     Under F/U CTS for thoracic aortic aneurysm      Patient expressed understanding  Encouraged and answered all questions   Discussed with the patient and all questioned fully answered. He will call me if any problems arise.   Discussed results of prior testing with patient : echo and cardiac cath as well as last CT chest   Reviewed and summarized recent test results        The current medical regimen is effective;  continue present plan and medications.   Keep A1c less than 7 Primary to monitor  Keep LDL below 70 mg/dl. Monitor liver and renal functions.   Monitor CBC, CMP, TSH (as indicated) and Lipid Panel by primary      S/L NTG PRN for chest pain, call me or go to ER if has to use S/L nitroglycerin      I support the patient's decision to take the Covid -19 vaccine   Had required complete course   No major issues   Now fully immunized    Recommend booster          Monitor for any signs of bleeding including red or dark stools as well as easy bruisabilty. Fall precautions.   Follow up with Agnieszka DICKINSON , Ben DICKINSON, Stephani Mitchell PA-C  or myself            Return in about 6 months (around 12/15/2022).

## 2022-07-05 ENCOUNTER — TELEPHONE (OUTPATIENT)
Dept: NEUROLOGY | Facility: CLINIC | Age: 62
End: 2022-07-05

## 2022-07-05 DIAGNOSIS — G63 POLYNEUROPATHY ASSOCIATED WITH UNDERLYING DISEASE: ICD-10-CM

## 2022-07-05 NOTE — TELEPHONE ENCOUNTER
Caller: Carlos A Boyer Jr.    Relationship: Self    Best call back number: [phone]    Requested Prescriptions:   Requested Prescriptions     Pending Prescriptions Disp Refills   • gabapentin (Neurontin) 300 MG capsule 90 capsule 0     Sig: Take 1 capsule by mouth Every Night.        Pharmacy where request should be sent: [facility] - [address] [phone] [facility] [phone]     Additional details provided by patient: PT CALLING TO GET REFILL ORDER FAXED TO [facility].  PT STATES HE HAS ANOTHER WEEK LEFT.      Does the patient have less than a 3 day supply:  [] Yes  [x] No    Tyrone Chacko Rep   07/05/22 16:06 CDT

## 2022-07-06 RX ORDER — GABAPENTIN 300 MG/1
300 CAPSULE ORAL NIGHTLY
Qty: 90 CAPSULE | Refills: 0 | Status: CANCELLED | OUTPATIENT
Start: 2022-07-06

## 2022-07-06 NOTE — TELEPHONE ENCOUNTER
[facility] in November for a 30 day supply with 5 refills.  The last refill was on .    Explained to Carlos A that the authorization with the [facility]  in May.  We are working on getting the authorization renewed.  The [facility] won't cover medications prescribed by our office without the authorization.

## 2022-07-06 NOTE — TELEPHONE ENCOUNTER
Tried calling Mr Boyer to remind him of his appointment for Friday, September 3rd, 2021. Tried calling to remind Mr Boyer to arrive [facility] at 930 am for 10 o'clock testing and follow up afterwards at 11 am with Arabella DICKINSON.     When calling it rang a few times and went to voicemail then stated voicemail was full and could not accept any new messages at this time.   
12-Jul-2022

## 2022-07-09 ENCOUNTER — HOSPITAL ENCOUNTER (EMERGENCY)
Facility: HOSPITAL | Age: 62
Discharge: HOME | End: 2022-07-09
Attending: EMERGENCY MEDICINE | Admitting: EMERGENCY MEDICINE

## 2022-07-09 ENCOUNTER — APPOINTMENT (OUTPATIENT)
Dept: CT IMAGING | Facility: HOSPITAL | Age: 62
End: 2022-07-09

## 2022-07-09 VITALS
SYSTOLIC BLOOD PRESSURE: 123 MMHG | BODY MASS INDEX: 33.59 KG/M2 | OXYGEN SATURATION: 97 % | DIASTOLIC BLOOD PRESSURE: 77 MMHG | WEIGHT: 248 LBS | RESPIRATION RATE: 18 BRPM | HEART RATE: 66 BPM | HEIGHT: 72 IN | TEMPERATURE: 97.7 F

## 2022-07-09 DIAGNOSIS — R10.9 FLANK PAIN: Primary | ICD-10-CM

## 2022-07-09 LAB
ALBUMIN SERPL-MCNC: 4.4 G/DL (ref 3.5–5.2)
ALBUMIN/GLOB SERPL: 1.4 G/DL
ALP SERPL-CCNC: 91 U/L (ref 39–117)
ALT SERPL W P-5'-P-CCNC: 23 U/L (ref 1–41)
ANION GAP SERPL CALCULATED.3IONS-SCNC: 10 MMOL/L (ref 5–15)
AST SERPL-CCNC: 20 U/L (ref 1–40)
BASOPHILS # BLD AUTO: 0.02 10*3/MM3 (ref 0–0.2)
BASOPHILS NFR BLD AUTO: 0.4 % (ref 0–1.5)
BILIRUB SERPL-MCNC: 0.7 MG/DL (ref 0–1.2)
BILIRUB UR QL STRIP: NEGATIVE
BUN SERPL-MCNC: 18 MG/DL (ref 8–23)
BUN/CREAT SERPL: 15.1 (ref 7–25)
CALCIUM SPEC-SCNC: 9.5 MG/DL (ref 8.6–10.5)
CHLORIDE SERPL-SCNC: 108 MMOL/L (ref 98–107)
CLARITY UR: CLEAR
CO2 SERPL-SCNC: 21 MMOL/L (ref 22–29)
COLOR UR: YELLOW
CREAT SERPL-MCNC: 1.19 MG/DL (ref 0.76–1.27)
DEPRECATED RDW RBC AUTO: 44.9 FL (ref 37–54)
EGFRCR SERPLBLD CKD-EPI 2021: 68.2 ML/MIN/1.73
EOSINOPHIL # BLD AUTO: 0.14 10*3/MM3 (ref 0–0.4)
EOSINOPHIL NFR BLD AUTO: 2.6 % (ref 0.3–6.2)
ERYTHROCYTE [DISTWIDTH] IN BLOOD BY AUTOMATED COUNT: 13.3 % (ref 12.3–15.4)
GLOBULIN UR ELPH-MCNC: 3.2 GM/DL
GLUCOSE SERPL-MCNC: 123 MG/DL (ref 65–99)
GLUCOSE UR STRIP-MCNC: ABNORMAL MG/DL
HCT VFR BLD AUTO: 46.4 % (ref 37.5–51)
HGB BLD-MCNC: 15.3 G/DL (ref 13–17.7)
HGB UR QL STRIP.AUTO: NEGATIVE
INR PPP: 1.08 (ref 0.91–1.09)
KETONES UR QL STRIP: NEGATIVE
LEUKOCYTE ESTERASE UR QL STRIP.AUTO: NEGATIVE
LYMPHOCYTES # BLD AUTO: 1.74 10*3/MM3 (ref 0.7–3.1)
LYMPHOCYTES NFR BLD AUTO: 32.6 % (ref 19.6–45.3)
MCH RBC QN AUTO: 30.2 PG (ref 26.6–33)
MCHC RBC AUTO-ENTMCNC: 33 G/DL (ref 31.5–35.7)
MCV RBC AUTO: 91.7 FL (ref 79–97)
MONOCYTES # BLD AUTO: 0.45 10*3/MM3 (ref 0.1–0.9)
MONOCYTES NFR BLD AUTO: 8.4 % (ref 5–12)
NEUTROPHILS NFR BLD AUTO: 2.96 10*3/MM3 (ref 1.7–7)
NEUTROPHILS NFR BLD AUTO: 55.4 % (ref 42.7–76)
NITRITE UR QL STRIP: NEGATIVE
PH UR STRIP.AUTO: <=5 [PH] (ref 5–8)
PLATELET # BLD AUTO: 127 10*3/MM3 (ref 140–450)
PMV BLD AUTO: 11 FL (ref 6–12)
POTASSIUM SERPL-SCNC: 5 MMOL/L (ref 3.5–5.2)
PROT SERPL-MCNC: 7.6 G/DL (ref 6–8.5)
PROT UR QL STRIP: NEGATIVE
PROTHROMBIN TIME: 13.6 SECONDS (ref 11.9–14.6)
RBC # BLD AUTO: 5.06 10*6/MM3 (ref 4.14–5.8)
SODIUM SERPL-SCNC: 139 MMOL/L (ref 136–145)
SP GR UR STRIP: 1.03 (ref 1–1.03)
UROBILINOGEN UR QL STRIP: ABNORMAL
WBC NRBC COR # BLD: 5.34 10*3/MM3 (ref 3.4–10.8)

## 2022-07-09 PROCEDURE — 96375 TX/PRO/DX INJ NEW DRUG ADDON: CPT

## 2022-07-09 PROCEDURE — 74176 CT ABD & PELVIS W/O CONTRAST: CPT

## 2022-07-09 PROCEDURE — 25010000002 ONDANSETRON PER 1 MG: Performed by: EMERGENCY MEDICINE

## 2022-07-09 PROCEDURE — 80053 COMPREHEN METABOLIC PANEL: CPT | Performed by: EMERGENCY MEDICINE

## 2022-07-09 PROCEDURE — 81003 URINALYSIS AUTO W/O SCOPE: CPT | Performed by: EMERGENCY MEDICINE

## 2022-07-09 PROCEDURE — 96374 THER/PROPH/DIAG INJ IV PUSH: CPT

## 2022-07-09 PROCEDURE — 85610 PROTHROMBIN TIME: CPT | Performed by: EMERGENCY MEDICINE

## 2022-07-09 PROCEDURE — 25010000002 KETOROLAC TROMETHAMINE PER 15 MG: Performed by: EMERGENCY MEDICINE

## 2022-07-09 PROCEDURE — 99283 EMERGENCY DEPT VISIT LOW MDM: CPT

## 2022-07-09 PROCEDURE — 85025 COMPLETE CBC W/AUTO DIFF WBC: CPT | Performed by: EMERGENCY MEDICINE

## 2022-07-09 RX ORDER — KETOROLAC TROMETHAMINE 15 MG/ML
15 INJECTION, SOLUTION INTRAMUSCULAR; INTRAVENOUS ONCE
Status: COMPLETED | OUTPATIENT
Start: 2022-07-09 | End: 2022-07-09

## 2022-07-09 RX ORDER — SODIUM CHLORIDE 0.9 % (FLUSH) 0.9 %
10 SYRINGE (ML) INJECTION AS NEEDED
Status: DISCONTINUED | OUTPATIENT
Start: 2022-07-09 | End: 2022-07-09 | Stop reason: HOSPADM

## 2022-07-09 RX ORDER — ONDANSETRON 2 MG/ML
4 INJECTION INTRAMUSCULAR; INTRAVENOUS ONCE
Status: COMPLETED | OUTPATIENT
Start: 2022-07-09 | End: 2022-07-09

## 2022-07-09 RX ADMIN — ONDANSETRON 4 MG: 2 INJECTION INTRAMUSCULAR; INTRAVENOUS at 09:10

## 2022-07-09 RX ADMIN — KETOROLAC TROMETHAMINE 15 MG: 15 INJECTION, SOLUTION INTRAMUSCULAR; INTRAVENOUS at 09:11

## 2022-07-09 NOTE — ED PROVIDER NOTES
Subjective   62-year-old male presents to the emergency department complaint of left flank pain.  History of kidney stones, hypertension, hyperlipidemia, diabetes.  Onset of pain yesterday.  Describes continuous, sharp stabbing pain in the left flank.  This is associated with urinary hesitancy.  No dysuria or hematuria.  No associated nausea or vomiting.  Patient states pain is continuous, 8/10 in severity.  No change with position.  Feels similar to previous episode of kidney stone.      History provided by:  Patient      Review of Systems   All other systems reviewed and are negative.      Past Medical History:   Diagnosis Date   • Arthritis    • Asthma    • Cancer (HCC)    • Carotid disease, bilateral (HCC)    • Chest pain    • Chronic diastolic congestive heart failure ([facility]) 9/7/2019   • Chronic sinusitis    • Maribell bullosa    • Coronary artery disease involving native coronary artery of native heart with unstable angina pectoris ([facility]) 1/17/2017   • Deviated septum    • Diabetes mellitus ([facility])    • Difficulty urinating    • Diverticulitis    • Enlarged prostate    • Fatty liver    • GERD (gastroesophageal reflux disease)    • Hyperlipidemia LDL goal <70 2/2/2017   • Hypertrophy of nasal turbinates    • Keratoderma    • Kidney stone    • Migraine    • Murmur, heart    • Myocardial infarction ([facility])    • Obesity    • Paroxysmal atrial fibrillation ([facility]) 7/11/2019   • PONV (postoperative nausea and vomiting)    • Primary hypertension 10/16/2016   • Psoriasis    • Seizures ([facility])    • Sinus congestion    • Skin cancer    • Sleep apnea    • SOB (shortness of breath)    • Stroke ([facility])    • UTI (urinary tract infection)        Allergies   Allergen Reactions   • Flagyl [Metronidazole] Hives   • Atorvastatin Other (See Comments)     Muscle cramps   • Ciprofloxacin Hives       Past Surgical History:   Procedure Laterality Date   • CARDIAC CATHETERIZATION  01/2016    Dr. Broadbent; widely patent previously placed stents in  the left anterior descending and obstructive disease involving the diagonal branch which was treated medically   • CARDIAC CATHETERIZATION N/A 7/14/2017    Procedure: Left Heart Cath;  Surgeon: Wade Ramey MD;  Location:  [facility] CATH [facility]ASIVE [facility];  Service:    • CARDIAC CATHETERIZATION Left 10/15/2018    Procedure: Cardiac Catheterization/Vascular Study;  Surgeon: Wade Ramey MD;  Location:  [facility];  Service: Cardiology   • CARDIAC CATHETERIZATION  10/15/2018    Procedure: Functional Flow [place];  Surgeon: Wade Ramey MD;  Location:  [facility];  Service: Cardiology   • CARDIAC CATHETERIZATION N/A 10/15/2018    Procedure: Left ventriculography;  Surgeon: Wade Ramey MD;  Location:  [facility];  Service: Cardiology   • CARDIAC CATHETERIZATION Left 6/26/2019    Procedure: Cardiac Catheterization/Vascular Study VEL OK  HE WILL WAIT 1 YEAR FOR SHOULDER SURGERY ;  Surgeon: Wade Ramey MD;  Location: [facility] CATH INVASIVE LOCATION;  Service: Cardiology   • CARDIAC CATHETERIZATION Left 4/30/2021    Procedure: Coronary angiography;  Surgeon: Sahil Llamas MD;  Location:  [facility];  Service: Cardiology;  Laterality: Left;   • CARDIAC CATHETERIZATION N/A 4/30/2021    Procedure: Percutaneous Coronary Intervention;  Surgeon: Sahil Llamas MD;  Location: [facility] CATH INVASIVE LOCATION;  Service: Cardiology;  Laterality: N/A;   • CHOLECYSTECTOMY     • CHOLECYSTECTOMY WITH INTRAOPERATIVE CHOLANGIOGRAM N/A 8/1/2018    Procedure: CHOLECYSTECTOMY LAPAROSCOPIC INTRAOPERATIVE CHOLANGIOGRAM;  Surgeon: Shane Ann MD;  Location: [facility] OR;  Service: General   • COLONOSCOPY N/A 7/14/2020    Procedure: COLONOSCOPY WITH ANESTHESIA;  Surgeon: Anupam Morales DO;  Location: [facility] ENDOSCOPY;  Service: Gastroenterology;  Laterality: N/A;  pre: abdominal pain  post: diverticulosis  Vinyaak Correia PA   • CORONARY ANGIOPLASTY     •  CORONARY ARTERY BYPASS GRAFT N/A 7/6/2019    Procedure: CABG X2 WITH LIMA, LEFT LEG OVH, AND PLACEMENT OF LEFT FEMORAL ARTERIAL LINE;  Surgeon: Steven Tang MD;  Location: [facility] OR;  Service: Cardiothoracic   • CORONARY STENT PLACEMENT      x 6   • ENDOSCOPIC FUNCTIONAL SINUS SURGERY (FESS) Bilateral 12/13/2017    Procedure: PROCEDURE PERFORMED:  Bilateral functional endoscopic anterior ethmoidectomy with bilateral middle meatal antrostomy Septoplasty Right kathia bullosa resection Bilateral inferior turbinate reduction via Coblation;  Surgeon: Mayank Ibarra MD;  Location: [facility] OR;  Service:    • ENDOSCOPY N/A 7/30/2018    Procedure: ESOPHAGOGASTRODUODENOSCOPY WITH ANESTHESIA;  Surgeon: Benitez Mas MD;  Location: [facility] ENDOSCOPY;  Service: Gastroenterology   • ENDOSCOPY N/A 7/14/2020    Procedure: ESOPHAGOGASTRODUODENOSCOPY WITH ANESTHESIA;  Surgeon: Anupam Morales DO;  Location: [facility] ENDOSCOPY;  Service: Gastroenterology;  Laterality: N/A;  pre: abdominal pain  post: esophagitis  Vinayak Correia PA   • HERNIA REPAIR      x2 inguinal area   • KIDNEY STONE SURGERY     • KNEE ARTHROSCOPY Right 3/1/2022    Procedure: RIGHT KNEE PARTIAL LATERAL MENISCECTOMY;  Surgeon: Pedro Pablo Song MD;  Location: [facility] OR;  Service: Orthopedics;  Laterality: Right;   • KNEE SURGERY Right    • OTHER SURGICAL HISTORY      urolift   • PROSTATE SURGERY      Dr. Badillo - 2017   • ROTATOR CUFF REPAIR     • THUMB AMPUTATION Left     partial   • TOE AMPUTATION Right     big       Family History   Problem Relation Age of Onset   • Heart disease Father    • COPD Mother    • Hypertension Mother    • Asthma Mother    • No Known Problems Sister    • Colon cancer Paternal Uncle    • Prostate cancer Maternal Grandfather    • No Known Problems Sister    • Colon cancer Maternal Grandmother    • Colon polyps Maternal Grandmother        Social History     Socioeconomic History   • Marital status:    Tobacco  Use   • Smoking status: Former Smoker     Packs/day: 1.50     Years: 4.50     Pack years: 6.75     Types: Cigarettes, Cigars     Quit date:      Years since quittin.5   • Smokeless tobacco: Former User     Types: Chew     Quit date:    Vaping Use   • Vaping Use: Never used   Substance and Sexual Activity   • Alcohol use: No   • Drug use: No   • Sexual activity: Defer           Objective   Physical Exam  Vitals and nursing note reviewed.   Constitutional:       General: He is not in acute distress.     Appearance: Normal appearance. He is normal weight.   HENT:      Head: Normocephalic and atraumatic.      Nose: Nose normal.      Mouth/Throat:      Mouth: Mucous membranes are moist.      Pharynx: Oropharynx is clear. No oropharyngeal exudate or posterior oropharyngeal erythema.   Eyes:      Extraocular Movements: Extraocular movements intact.      Conjunctiva/sclera: Conjunctivae normal.      Pupils: Pupils are equal, round, and reactive to light.   Cardiovascular:      Rate and Rhythm: Normal rate and regular rhythm.      Pulses: Normal pulses.      Heart sounds: Normal heart sounds.   Pulmonary:      Effort: Pulmonary effort is normal.      Breath sounds: Normal breath sounds. No wheezing, rhonchi or rales.   Abdominal:      General: Abdomen is flat. Bowel sounds are normal. There is no distension.      Palpations: Abdomen is soft.      Tenderness: There is no abdominal tenderness. There is left CVA tenderness.   Musculoskeletal:         General: No tenderness. Normal range of motion.      Cervical back: Normal range of motion and neck supple. No rigidity. No muscular tenderness.      Right lower leg: No edema.      Left lower leg: No edema.   Skin:     General: Skin is warm and dry.      Capillary Refill: Capillary refill takes less than 2 seconds.      Findings: No rash.   Neurological:      General: No focal deficit present.      Mental Status: He is alert and oriented to person, place, and time.  Mental status is at baseline.      Cranial Nerves: No cranial nerve deficit.      Sensory: No sensory deficit.      Motor: No weakness.   Psychiatric:         Mood and Affect: Mood normal.         Behavior: Behavior normal.         Thought Content: Thought content normal.         Procedures         Lab Results (last 24 hours)     Procedure Component Value Units Date/Time    Urinalysis With Culture If Indicated - Urine, Clean Catch [[id number]]  (Abnormal) Collected: 07/09/22 0824    Specimen: Urine, Clean Catch Updated: 07/09/22 0833     Color, UA Yellow     Appearance, UA Clear     pH, UA <=5.0     Specific Gravity, UA 1.027     Glucose, UA >=1000 mg/dL (3+)     Ketones, UA Negative     Bilirubin, UA Negative     Blood, UA Negative     Protein, UA Negative     Leuk Esterase, UA Negative     Nitrite, UA Negative     Urobilinogen, UA 0.2 E.U./dL    Narrative:      In absence of clinical symptoms, the presence of pyuria, bacteria, and/or nitrites on the urinalysis result does not correlate with infection.  Urine microscopic not indicated.    CBC & Differential [[id number]]  (Abnormal) Collected: 07/09/22 0858    Specimen: Blood Updated: 07/09/22 0924    Narrative:      The following orders were created for panel order CBC & Differential.  Procedure                               Abnormality         Status                     ---------                               -----------         ------                     CBC Auto Differential[[id number]]        Abnormal            Final result                 Please view results for these tests on the individual orders.    Comprehensive Metabolic Panel [[id number]]  (Abnormal) Collected: 07/09/22 0858    Specimen: Blood Updated: 07/09/22 1002     Glucose 123 mg/dL      BUN 18 mg/dL      Creatinine 1.19 mg/dL      Sodium 139 mmol/L      Potassium 5.0 mmol/L      Chloride 108 mmol/L      CO2 21.0 mmol/L      Calcium 9.5 mg/dL      Total Protein 7.6 g/dL      Albumin 4.40 g/dL      ALT  (SGPT) 23 U/L      AST (SGOT) 20 U/L      Alkaline Phosphatase 91 U/L      Total Bilirubin 0.7 mg/dL      Globulin 3.2 gm/dL      A/G Ratio 1.4 g/dL      BUN/Creatinine Ratio 15.1     Anion Gap 10.0 mmol/L      eGFR 68.2 mL/min/1.73      Comment: National Kidney Foundation and American Society of Nephrology (ASN) Task Force recommended calculation based on the Chronic Kidney Disease Epidemiology Collaboration (CKD-EPI) equation refit without adjustment for race.       Narrative:      GFR Normal >60  Chronic Kidney Disease <60  Kidney Failure <15      Protime-INR [[id number]]  (Normal) Collected: 07/09/22 0858    Specimen: Blood Updated: 07/09/22 0920     Protime 13.6 Seconds      INR 1.08    CBC Auto Differential [[id number]]  (Abnormal) Collected: 07/09/22 0858    Specimen: Blood Updated: 07/09/22 0924     WBC 5.34 10*3/mm3      RBC 5.06 10*6/mm3      Hemoglobin 15.3 g/dL      Hematocrit 46.4 %      MCV 91.7 fL      MCH 30.2 pg      MCHC 33.0 g/dL      RDW 13.3 %      RDW-SD 44.9 fl      MPV 11.0 fL      Platelets 127 10*3/mm3      Neutrophil % 55.4 %      Lymphocyte % 32.6 %      Monocyte % 8.4 %      Eosinophil % 2.6 %      Basophil % 0.4 %      Neutrophils, Absolute 2.96 10*3/mm3      Lymphocytes, Absolute 1.74 10*3/mm3      Monocytes, Absolute 0.45 10*3/mm3      Eosinophils, Absolute 0.14 10*3/mm3      Basophils, Absolute 0.02 10*3/mm3       CT Abdomen Pelvis Stone Protocol    Result Date: 7/9/2022  CT ABDOMEN PELVIS STONE PROTOCOL- 7/9/2022 8:17 AM CDT  HISTORY: left flank pain  COMPARISON: CT scan dated 3/30/2022  DOSE LENGTH PRODUCT: 759 mGy cm. Automated exposure control was also utilized to decrease patient radiation dose.  TECHNIQUE: Noncontrast enhanced images of the abdomen and pelvis obtained without oral contrast. Multiplanar reformatted images were provided for review.  FINDINGS: LOWER CHEST: Lung bases are clear. Coronary atheromatous calcification.  LIVER: Mild hepatomegaly. Otherwise grossly normal  without contrast.  BILIARY SYSTEM: The gallbladder has been removed. There is no evidence of biliary ductal dilation.  PANCREAS: No focal pancreatic lesion within limits of a noncontrast study.  SPLEEN: Unremarkable.  KIDNEYS: There are 2 small nonobstructing left inferior pole renal stones, the larger measuring 2-3 mm. No obstructing stones. No hydronephrosis. The ureters are decompressed and normal in appearance.  ADRENALS: Unremarkable.  RETROPERITONEUM: No mass, lymphadenopathy or hemorrhage.  GI TRACT: Stomach and small bowel are unremarkable. Colonic diverticulosis without acute diverticulitis. The appendix is visualized and unremarkable.  OTHER: There is no mesenteric mass, lymphadenopathy or fluid collection. Mild to moderate atheromatous plaque along the abdominal aorta. No acute bony abnormality. No osteoblastic lesions identified. T12 vertebral body hemangioma. Lumbar facet arthropathy.  PELVIS: Urinary bladder is completely decompressed. Prostate gland brachytherapy seeds. No pelvic adenopathy.      1. There are 2 small nonobstructing left inferior pole renal stones, the larger measuring 2-3 mm. No obstructing stones. No hydronephrosis. 2. Urinary bladder is decompressed. 3. Prostate gland brachytherapy seeds. No pelvic adenopathy or osteoblastic lesions. 4. Coronary atheromatous calcification. 5. Colonic diverticulosis. This report was finalized on 07/09/2022 08:32 by Dr Merritt Reaves, .    ED Course  ED Course as of 07/09/22 1018   Sat Jul 09, 2022   1015 No ureteral stone or evidence of urinary tract infection.  Does have 2 small stones in the inferior pole of the left kidney this is not causing the patient symptoms.   May be musculoskeletal.  No CT evidence for any emergency medical condition.  Recommend patient take Tylenol and ibuprofen for pain, can follow-up with his primary doctor as an outpatient. [AW]      ED Course User Index  [AW] Dewayne Kenny MD                                            MDM    Final diagnoses:   Flank pain       ED Disposition  ED Disposition     ED Disposition   Discharge    Condition   Stable    Comment   --             Vinayak Correia, PA  [address]  [place] [id number] [phone]    Schedule an appointment as soon as possible for a visit   As needed         Medication List      No changes were made to your prescriptions during this visit.          Dewayne Kenny MD  07/09/22 1013

## 2022-07-11 DIAGNOSIS — G63 POLYNEUROPATHY ASSOCIATED WITH UNDERLYING DISEASE: ICD-10-CM

## 2022-07-11 RX ORDER — GABAPENTIN 300 MG/1
300 CAPSULE ORAL NIGHTLY
Qty: 30 CAPSULE | Refills: 2 | Status: SHIPPED | OUTPATIENT
Start: 2022-07-11

## 2022-07-21 ENCOUNTER — TELEPHONE (OUTPATIENT)
Dept: CARDIOLOGY | Facility: CLINIC | Age: 62
End: 2022-07-21

## 2022-07-21 RX ORDER — TORSEMIDE 20 MG/1
20 TABLET ORAL DAILY
Qty: 90 TABLET | Refills: 3 | Status: SHIPPED | OUTPATIENT
Start: 2022-07-21 | End: 2022-11-09 | Stop reason: HOSPADM

## 2022-08-11 ENCOUNTER — TELEPHONE (OUTPATIENT)
Dept: VASCULAR SURGERY | Facility: CLINIC | Age: 62
End: 2022-08-11

## 2022-08-12 ENCOUNTER — HOSPITAL ENCOUNTER (OUTPATIENT)
Dept: ULTRASOUND IMAGING | Facility: HOSPITAL | Age: 62
Discharge: HOME | End: 2022-08-12
Admitting: NURSE PRACTITIONER

## 2022-08-12 ENCOUNTER — OFFICE VISIT (OUTPATIENT)
Dept: VASCULAR SURGERY | Facility: CLINIC | Age: 62
End: 2022-08-12

## 2022-08-12 VITALS
SYSTOLIC BLOOD PRESSURE: 122 MMHG | BODY MASS INDEX: 34.54 KG/M2 | DIASTOLIC BLOOD PRESSURE: 82 MMHG | HEART RATE: 68 BPM | OXYGEN SATURATION: 96 % | WEIGHT: 255 LBS | HEIGHT: 72 IN

## 2022-08-12 DIAGNOSIS — Z79.4 TYPE 2 DIABETES MELLITUS WITHOUT COMPLICATION, WITH LONG-TERM CURRENT USE OF INSULIN: ICD-10-CM

## 2022-08-12 DIAGNOSIS — E11.9 TYPE 2 DIABETES MELLITUS WITHOUT COMPLICATION, WITH LONG-TERM CURRENT USE OF INSULIN: ICD-10-CM

## 2022-08-12 DIAGNOSIS — I10 ESSENTIAL HYPERTENSION: ICD-10-CM

## 2022-08-12 DIAGNOSIS — I65.23 BILATERAL CAROTID ARTERY STENOSIS: Primary | ICD-10-CM

## 2022-08-12 DIAGNOSIS — E78.2 MIXED HYPERLIPIDEMIA: ICD-10-CM

## 2022-08-12 DIAGNOSIS — I65.23 BILATERAL CAROTID ARTERY STENOSIS: ICD-10-CM

## 2022-08-12 PROCEDURE — 99214 OFFICE O/P EST MOD 30 MIN: CPT | Performed by: NURSE PRACTITIONER

## 2022-08-12 PROCEDURE — 93880 EXTRACRANIAL BILAT STUDY: CPT | Performed by: SURGERY

## 2022-08-12 PROCEDURE — 93880 EXTRACRANIAL BILAT STUDY: CPT

## 2022-08-12 NOTE — PROGRESS NOTES
"08/12/2022       Vinayak Correia PA  [address]  [place] [address]    Carlos A Boyer Jr.  1960    Chief Complaint   Patient presents with   • Carotid Artery Disease     1yr f/u with carotid testing.  Last seen on 9/3/21.  Pt denies any stroke like symptoms.        Dear Vinayak Correia PA   HPI  I had the pleasure of seeing your patient Carlos A Boyer Jr. in the office today.  As you recall, Carlos A Boyer Jr. is a 62 y.o.  male who we are following for asymptomatic carotid occlusive disease.  He has a history of stroke with facial drooping and left-sided weakness.  He is maintained on aspirin, Eliquis, Tricor, and Crestor.  He does follow with Dr. Tang for a 4.4 cm ascending aortic aneurysm.  Currently he is doing well and denies any strokelike symptoms.  He did have noninvasive testing performed today, which I did review in office.    Review of Systems   Constitutional: Negative.    HENT: Negative.    Eyes: Negative.    Respiratory: Negative.    Cardiovascular: Negative.    Gastrointestinal: Negative.    Endocrine: Negative.    Genitourinary: Negative.    Musculoskeletal: Negative.    Skin: Negative.    Allergic/Immunologic: Negative.    Neurological: Negative.    Hematological: Negative.    Psychiatric/Behavioral: Negative.    All other systems reviewed and are negative.        /82   Pulse 68   Ht 182.9 cm (72\")   Wt 116 kg (255 lb)   SpO2 96%   BMI 34.58 kg/m²    Physical Exam  Vitals and nursing note reviewed.   Constitutional:       General: He is not in acute distress.     Appearance: Normal appearance. He is well-developed. He is obese. He is not diaphoretic.   HENT:      Head: Normocephalic and atraumatic.   Neck:      Vascular: No carotid bruit or JVD.   Cardiovascular:      Rate and Rhythm: Normal rate and regular rhythm.      Pulses: Normal pulses.           Femoral pulses are 2+ on the right side and 2+ on the left side.       Popliteal pulses are 2+ on " the right side and 2+ on the left side.        Dorsalis pedis pulses are 2+ on the right side and 2+ on the left side.        Posterior tibial pulses are 2+ on the right side and 2+ on the left side.      Heart sounds: Normal heart sounds, S1 normal and S2 normal. No murmur heard.    No friction rub. No gallop.   Pulmonary:      Effort: Pulmonary effort is normal.      Breath sounds: Normal breath sounds.   Abdominal:      General: Bowel sounds are normal. There is no abdominal bruit.      Palpations: Abdomen is soft.      Tenderness: There is no abdominal tenderness.   Musculoskeletal:         General: Normal range of motion.      Cervical back: Neck supple.   Skin:     General: Skin is warm and dry.   Neurological:      General: No focal deficit present.      Mental Status: He is alert and oriented to person, place, and time.      Cranial Nerves: No cranial nerve deficit.   Psychiatric:         Mood and Affect: Mood normal.         Behavior: Behavior normal.         Thought Content: Thought content normal.         Judgment: Judgment normal.         Diagnostic data:   History: Carotid occlusive disease     IMPRESSION:  Impression:  1. There is less than 50% stenosis of the right internal carotid artery.  2. There is 50-69% stenosis of the left internal carotid artery.  3. Antegrade flow is demonstrated in bilateral vertebral arteries.     Comments: Bilateral carotid vertebral arterial duplex scan was  performed.     Grayscale imaging shows intimal thickening and calcified elements at the  carotid bifurcation. The right internal carotid artery peak systolic  velocity is 96.8 cm/sec. The end-diastolic velocity is 20.6 cm/sec. The  right ICA/CCA ratio is approximately 0.7 . These findings correlate with  less than 50% stenosis of the right internal carotid artery.     Grayscale imaging shows intimal thickening and calcified elements at the  carotid bifurcation. The left internal carotid artery peak systolic  velocity  is 135.9 cm/sec. The end-diastolic velocity is 46.2 cm/sec. The  left ICA/CCA ratio is approximately 1.2 . These findings correlate with  50-69% stenosis of the left internal carotid artery.     Antegrade flow is demonstrated in bilateral vertebral arteries.  There is greater than 50% stenosis of the right common carotid artery  and the right external carotid artery.  This report was finalized on 08/12/2022 15:08 by Dr. Chetan Echavarria MD.       Patient Active Problem List   Diagnosis   • Type 2 diabetes mellitus with circulatory disorder, with long-term current use of insulin ([facility])   • Gastroesophageal reflux disease without esophagitis   • Primary hypertension   • Seizure disorder ([facility])   • Carotid disease, bilateral ([facility])   • Coronary artery disease involving native coronary artery of native heart with unstable angina pectoris ([facility])   • Hyperlipidemia LDL goal <70   • Chronic left arterial ischemic stroke, ICA (internal carotid artery)   • HOA on CPAP   • Benign non-nodular prostatic hyperplasia with lower urinary tract symptoms   • Deviated nasal septum   • Maribell bullosa   • Hypertrophy of both inferior nasal turbinates   • Chronic sinusitis of both maxillary sinuses   • S/P FESS (functional endoscopic sinus surgery)   • Diabetic complication ([facility])   • Epigastric pain   • Diabetic peripheral neuropathy ([facility])   • Class 1 obesity due to excess calories with serious comorbidity and body mass index (BMI) of 32.0 to 32.9 in adult   • 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[facility])   • History of seizure   • Lung nodules   • Cardiac murmur   • Thoracic aortic aneurysm without rupture ([facility])   • Paroxysmal atrial fibrillation ([facility])   • Atrial flutter (CMS/HCC) with cardioversion on Sept 5, 2019   • Chronic diastolic congestive heart failure ([facility])   • Fluid overload   • Pleural effusion   • Respiratory distress   • Long-term use of high-risk medication   • Dysphagia   • Left lower quadrant abdominal pain   • COVID-19  virus detected   • Shortness of breath   • S/P CABG x 2   • Chronic anticoagulation   • Old complex tear of lateral meniscus of right knee        Diagnosis Plan   1. Bilateral carotid artery stenosis  US Carotid Bilateral   2. Essential hypertension     3. Mixed hyperlipidemia     4. Type 2 diabetes mellitus without complication, with long-term current use of insulin ([facility])         Plan: After thoroughly evaluating Carlos A Boyer Jr., I believe the best course of action is to remain conservative from vascular surgery standpoint.  Currently he is doing well and denies any strokelike symptoms.  I did review his testing which is stable with 50 to 69% right carotid stenosis and less than 50% left carotid stenosis.  I will see him back in 1 year with repeat noninvasive testing for continued surveillance, including a carotid duplex.  I did discuss vascular risk factors as they pertain to the progression of vascular disease including controlling his hypertension, hyperlipidemia, and diabetes.  His blood pressure stable on his current medications.  He is maintained on Lipitor for his hyperlipidemia.  His PCP follows his diabetes but I do not have current labs for review.  The patient is to continue taking their medications as previously discussed.   This was all discussed in full with complete understanding.  Thank you for allowing me to participate in the care of your patient.  Please do not hesitate to call with any questions or concerns.  We will keep you aware of any further encounters with Carlos A Boyer Jr..        Sincerely yours,         TEENA Fishman Jamie Walker, PA

## 2022-08-14 ENCOUNTER — APPOINTMENT (OUTPATIENT)
Dept: GENERAL RADIOLOGY | Facility: HOSPITAL | Age: 62
End: 2022-08-14

## 2022-08-14 ENCOUNTER — HOSPITAL ENCOUNTER (EMERGENCY)
Facility: HOSPITAL | Age: 62
Discharge: HOME | End: 2022-08-14
Admitting: STUDENT IN AN ORGANIZED HEALTH CARE EDUCATION/TRAINING PROGRAM

## 2022-08-14 VITALS
DIASTOLIC BLOOD PRESSURE: 71 MMHG | HEIGHT: 74 IN | TEMPERATURE: 98 F | OXYGEN SATURATION: 94 % | WEIGHT: 252 LBS | SYSTOLIC BLOOD PRESSURE: 138 MMHG | HEART RATE: 92 BPM | RESPIRATION RATE: 20 BRPM | BODY MASS INDEX: 32.34 KG/M2

## 2022-08-14 DIAGNOSIS — R07.9 CHEST PAIN, UNSPECIFIED TYPE: Primary | ICD-10-CM

## 2022-08-14 LAB
ALBUMIN SERPL-MCNC: 4.3 G/DL (ref 3.5–5.2)
ALBUMIN/GLOB SERPL: 1.7 G/DL
ALP SERPL-CCNC: 85 U/L (ref 39–117)
ALT SERPL W P-5'-P-CCNC: 23 U/L (ref 1–41)
ANION GAP SERPL CALCULATED.3IONS-SCNC: 13 MMOL/L (ref 5–15)
APTT PPP: 34.1 SECONDS (ref 24.1–35)
AST SERPL-CCNC: 21 U/L (ref 1–40)
BASOPHILS # BLD AUTO: 0.02 10*3/MM3 (ref 0–0.2)
BASOPHILS NFR BLD AUTO: 0.4 % (ref 0–1.5)
BILIRUB SERPL-MCNC: 0.6 MG/DL (ref 0–1.2)
BUN SERPL-MCNC: 18 MG/DL (ref 8–23)
BUN/CREAT SERPL: 16.4 (ref 7–25)
CALCIUM SPEC-SCNC: 9.5 MG/DL (ref 8.6–10.5)
CHLORIDE SERPL-SCNC: 106 MMOL/L (ref 98–107)
CO2 SERPL-SCNC: 20 MMOL/L (ref 22–29)
CREAT SERPL-MCNC: 1.1 MG/DL (ref 0.76–1.27)
D DIMER PPP FEU-MCNC: 0.5 MCGFEU/ML (ref 0–0.5)
DEPRECATED RDW RBC AUTO: 44.3 FL (ref 37–54)
EGFRCR SERPLBLD CKD-EPI 2021: 75 ML/MIN/1.73
EOSINOPHIL # BLD AUTO: 0.14 10*3/MM3 (ref 0–0.4)
EOSINOPHIL NFR BLD AUTO: 2.5 % (ref 0.3–6.2)
ERYTHROCYTE [DISTWIDTH] IN BLOOD BY AUTOMATED COUNT: 13.1 % (ref 12.3–15.4)
GLOBULIN UR ELPH-MCNC: 2.5 GM/DL
GLUCOSE SERPL-MCNC: 198 MG/DL (ref 65–99)
HCT VFR BLD AUTO: 40.8 % (ref 37.5–51)
HGB BLD-MCNC: 13.6 G/DL (ref 13–17.7)
HOLD SPECIMEN: NORMAL
INR PPP: 1.1 (ref 0.91–1.09)
LYMPHOCYTES # BLD AUTO: 1.84 10*3/MM3 (ref 0.7–3.1)
LYMPHOCYTES NFR BLD AUTO: 33.2 % (ref 19.6–45.3)
MCH RBC QN AUTO: 30.7 PG (ref 26.6–33)
MCHC RBC AUTO-ENTMCNC: 33.3 G/DL (ref 31.5–35.7)
MCV RBC AUTO: 92.1 FL (ref 79–97)
MONOCYTES # BLD AUTO: 0.48 10*3/MM3 (ref 0.1–0.9)
MONOCYTES NFR BLD AUTO: 8.7 % (ref 5–12)
NEUTROPHILS NFR BLD AUTO: 3.04 10*3/MM3 (ref 1.7–7)
NEUTROPHILS NFR BLD AUTO: 54.8 % (ref 42.7–76)
NT-PROBNP SERPL-MCNC: 56 PG/ML (ref 0–900)
PLATELET # BLD AUTO: 114 10*3/MM3 (ref 140–450)
PMV BLD AUTO: 11.5 FL (ref 6–12)
POTASSIUM SERPL-SCNC: 4.7 MMOL/L (ref 3.5–5.2)
PROT SERPL-MCNC: 6.8 G/DL (ref 6–8.5)
PROTHROMBIN TIME: 13.8 SECONDS (ref 11.9–14.6)
RBC # BLD AUTO: 4.43 10*6/MM3 (ref 4.14–5.8)
SARS-COV-2 RNA PNL SPEC NAA+PROBE: NOT DETECTED
SODIUM SERPL-SCNC: 139 MMOL/L (ref 136–145)
TROPONIN T SERPL-MCNC: <0.01 NG/ML (ref 0–0.03)
TROPONIN T SERPL-MCNC: <0.01 NG/ML (ref 0–0.03)
WBC NRBC COR # BLD: 5.54 10*3/MM3 (ref 3.4–10.8)
WHOLE BLOOD HOLD COAG: NORMAL
WHOLE BLOOD HOLD SPECIMEN: NORMAL

## 2022-08-14 PROCEDURE — 85730 THROMBOPLASTIN TIME PARTIAL: CPT | Performed by: NURSE PRACTITIONER

## 2022-08-14 PROCEDURE — 96374 THER/PROPH/DIAG INJ IV PUSH: CPT

## 2022-08-14 PROCEDURE — 85025 COMPLETE CBC W/AUTO DIFF WBC: CPT | Performed by: NURSE PRACTITIONER

## 2022-08-14 PROCEDURE — 25010000002 MORPHINE SULFATE (PF) 2 MG/ML SOLUTION: Performed by: NURSE PRACTITIONER

## 2022-08-14 PROCEDURE — 93010 ELECTROCARDIOGRAM REPORT: CPT | Performed by: INTERNAL MEDICINE

## 2022-08-14 PROCEDURE — 71045 X-RAY EXAM CHEST 1 VIEW: CPT

## 2022-08-14 PROCEDURE — 96375 TX/PRO/DX INJ NEW DRUG ADDON: CPT

## 2022-08-14 PROCEDURE — 99284 EMERGENCY DEPT VISIT MOD MDM: CPT

## 2022-08-14 PROCEDURE — 85610 PROTHROMBIN TIME: CPT | Performed by: NURSE PRACTITIONER

## 2022-08-14 PROCEDURE — 25010000002 ONDANSETRON PER 1 MG: Performed by: NURSE PRACTITIONER

## 2022-08-14 PROCEDURE — 80053 COMPREHEN METABOLIC PANEL: CPT | Performed by: NURSE PRACTITIONER

## 2022-08-14 PROCEDURE — 83880 ASSAY OF NATRIURETIC PEPTIDE: CPT | Performed by: NURSE PRACTITIONER

## 2022-08-14 PROCEDURE — 93005 ELECTROCARDIOGRAM TRACING: CPT | Performed by: NURSE PRACTITIONER

## 2022-08-14 PROCEDURE — 36415 COLL VENOUS BLD VENIPUNCTURE: CPT

## 2022-08-14 PROCEDURE — 85379 FIBRIN DEGRADATION QUANT: CPT | Performed by: NURSE PRACTITIONER

## 2022-08-14 PROCEDURE — 84484 ASSAY OF TROPONIN QUANT: CPT | Performed by: NURSE PRACTITIONER

## 2022-08-14 PROCEDURE — 87635 SARS-COV-2 COVID-19 AMP PRB: CPT | Performed by: NURSE PRACTITIONER

## 2022-08-14 RX ORDER — ONDANSETRON 2 MG/ML
4 INJECTION INTRAMUSCULAR; INTRAVENOUS ONCE
Status: COMPLETED | OUTPATIENT
Start: 2022-08-14 | End: 2022-08-14

## 2022-08-14 RX ORDER — SODIUM CHLORIDE 0.9 % (FLUSH) 0.9 %
10 SYRINGE (ML) INJECTION AS NEEDED
Status: DISCONTINUED | OUTPATIENT
Start: 2022-08-14 | End: 2022-08-14 | Stop reason: HOSPADM

## 2022-08-14 RX ORDER — MORPHINE SULFATE 2 MG/ML
2 INJECTION, SOLUTION INTRAMUSCULAR; INTRAVENOUS ONCE
Status: COMPLETED | OUTPATIENT
Start: 2022-08-14 | End: 2022-08-14

## 2022-08-14 RX ADMIN — ONDANSETRON 4 MG: 2 INJECTION INTRAMUSCULAR; INTRAVENOUS at 18:06

## 2022-08-14 RX ADMIN — MORPHINE SULFATE 2 MG: 2 INJECTION, SOLUTION INTRAMUSCULAR; INTRAVENOUS at 18:06

## 2022-08-14 NOTE — ED PROVIDER NOTES
Subjective   Patient is a 62-year-old male who presents to the ER with complaints of chest pain.  Patient states he had been running errands earlier today and just prior to getting home he felt short of breath and winded.  He states he checked his pulse ox which was 92% and his heart rate was 122 with a blood pressure of 96/77.  He describes having palpitations and felt as if he may be in atrial fibrillation.  Patient states since this time he has had chest pain in particular to the left breast and mid sternal region.  He states he took 2 aspirin and a nitroglycerin without relief of symptoms.  Pain is reproducible on exam.  He reports mild nausea without any vomiting.  He describes his chest pain as a tightness to the chest.  Patient states he has had multiple cardiac stents and a CABG x2 performed by Dr. Tang January 6, 2019.  Patient is followed by Dr. Ramey with cardiology.  Per review patient's last cardiac catheterization was performed by Dr. Llamas on April 30, 2021 for unstable angina.  Findings discussed below:    Impression:  1.  Native two-vessel coronary artery disease, including chronic total occlusion of the mid-LAD and moderate to severe stenosis of the ostium of OM1.  Both bypass grafts (LIMA to LAD and SVG to OM1) are widely patent.  No changes in native anatomy compared to pre-CABG films from 2019 (with the exception of mid-LAD, which is now chronically and totally occluded, which is expected in the presence of the LIMA, which is now responsible for filling the remainder of the mid to distal LAD).  2.  No culprit stenosis seen for current presentation.  3.  Normal LVEF  4.  Elevated LVEDP.     Plan:    Continue aspirin 81mg daily, and can resume Eliquis. Increase Imdur to 60 mg daily for improved antianginal benefit. Increase lisinopril dose to 40 mg daily.    Patient states he had 3 episodes of rapid atrial fibrillation which required cardioversion after his CABG.  He was concerned he may be in A.  fib.  Patient denies any cough or congestion.  He has had no recorded fevers.  He has no vomiting or diarrhea.  He states he was at his usual state of health prior to symptom onset.          Review of Systems   Constitutional: Negative.  Negative for fatigue and fever.   HENT: Negative.  Negative for congestion.    Eyes: Negative.    Respiratory: Positive for shortness of breath. Negative for cough.    Cardiovascular: Positive for chest pain and palpitations.   Gastrointestinal: Positive for nausea. Negative for abdominal pain, diarrhea and vomiting.   Genitourinary: Negative.    Musculoskeletal: Negative.  Negative for back pain.   Skin: Negative.    All other systems reviewed and are negative.      Past Medical History:   Diagnosis Date   • Arthritis    • Asthma    • Cancer (HCC)    • Carotid disease, bilateral (HCC)    • Chest pain    • Chronic diastolic congestive heart failure ([facility]) 9/7/2019   • Chronic sinusitis    • Maribell bullosa    • Coronary artery disease involving native coronary artery of native heart with unstable angina pectoris ([facility]) 1/17/2017   • Deviated septum    • Diabetes mellitus ([facility])    • Difficulty urinating    • Diverticulitis    • Enlarged prostate    • Fatty liver    • GERD (gastroesophageal reflux disease)    • Hyperlipidemia LDL goal <70 2/2/2017   • Hypertrophy of nasal turbinates    • Keratoderma    • Kidney stone    • Migraine    • Murmur, heart    • Myocardial infarction ([facility])    • Obesity    • Paroxysmal atrial fibrillation ([facility]) 7/11/2019   • PONV (postoperative nausea and vomiting)    • Primary hypertension 10/16/2016   • Psoriasis    • Seizures ([facility])    • Sinus congestion    • Skin cancer    • Sleep apnea    • SOB (shortness of breath)    • Stroke ([facility])    • UTI (urinary tract infection)        Allergies   Allergen Reactions   • Flagyl [Metronidazole] Hives   • Atorvastatin Other (See Comments)     Muscle cramps   • Ciprofloxacin Hives       Past Surgical History:   Procedure  Laterality Date   • CARDIAC CATHETERIZATION  01/2016    Dr. Broadbent; widely patent previously placed stents in the left anterior descending and obstructive disease involving the diagonal branch which was treated medically   • CARDIAC CATHETERIZATION N/A 7/14/2017    Procedure: Left Heart Cath;  Surgeon: Wade Ramey MD;  Location:  [facility] CATH INVASIVE LOCATION;  Service:    • CARDIAC CATHETERIZATION Left 10/15/2018    Procedure: Cardiac Catheterization/Vascular Study;  Surgeon: Wade Ramey MD;  Location:  [facility] CATH INVASIVE LOC[facility];  Service: Cardiology   • CARDIAC CATHETERIZATION  10/15/2018    Procedure: Functional Flow [place];  Surgeon: Wade Ramey MD;  Location:  [facility] CATH INVASIVE LOCATION;  Service: Cardiology   • CARDIAC CATHETERIZATION N/A 10/15/2018    Procedure: Left ventriculography;  Surgeon: Wade Ramey MD;  Location:  [facility] CATH INVASIVE LOC[facility];  Service: Cardiology   • CARDIAC CATHETERIZATION Left 6/26/2019    Procedure: Cardiac Catheterization/Vascular Study VEL OK  HE WILL WAIT 1 YEAR FOR SHOULDER SURGERY ;  Surgeon: Wade Ramey MD;  Location: [facility] CATH INVASIVE LOCATION;  Service: Cardiology   • CARDIAC CATHETERIZATION Left 4/30/2021    Procedure: Coronary angiography;  Surgeon: Sahil Llamas MD;  Location:  [facility] CATH INVASIVE LOCATION;  Service: Cardiology;  Laterality: Left;   • CARDIAC CATHETERIZATION N/A 4/30/2021    Procedure: Percutaneous Coronary Intervention;  Surgeon: Sahil Llamas MD;  Location: [facility] CATH INVASIVE LOCATION;  Service: Cardiology;  Laterality: N/A;   • CHOLECYSTECTOMY     • CHOLECYSTECTOMY WITH INTRAOPERATIVE CHOLANGIOGRAM N/A 8/1/2018    Procedure: CHOLECYSTECTOMY LAPAROSCOPIC INTRAOPERATIVE CHOLANGIOGRAM;  Surgeon: Shane Ann MD;  Location: [facility] OR;  Service: General   • COLONOSCOPY N/A 7/14/2020    Procedure: COLONOSCOPY WITH ANESTHESIA;  Surgeon: Anupam Morales DO;  Location: [facility] ENDOSCOPY;  Service: Gastroenterology;   Laterality: N/A;  pre: abdominal pain  post: diverticulosis  Vinayak Correia PA   • CORONARY ANGIOPLASTY     • CORONARY ARTERY BYPASS GRAFT N/A 7/6/2019    Procedure: CABG X2 WITH LIMA, LEFT LEG OVH, AND PLACEMENT OF LEFT FEMORAL ARTERIAL LINE;  Surgeon: Steven Tang MD;  Location: [facility] OR;  Service: Cardiothoracic   • CORONARY STENT PLACEMENT      x 6   • ENDOSCOPIC FUNCTIONAL SINUS SURGERY (FESS) Bilateral 12/13/2017    Procedure: PROCEDURE PERFORMED:  Bilateral functional endoscopic anterior ethmoidectomy with bilateral middle meatal antrostomy Septoplasty Right kathia bullosa resection Bilateral inferior turbinate reduction via Coblation;  Surgeon: Mayank Ibarra MD;  Location: [facility] OR;  Service:    • ENDOSCOPY N/A 7/30/2018    Procedure: ESOPHAGOGASTRODUODENOSCOPY WITH ANESTHESIA;  Surgeon: Benitez Mas MD;  Location: [facility] ENDOSCOPY;  Service: Gastroenterology   • ENDOSCOPY N/A 7/14/2020    Procedure: ESOPHAGOGASTRODUODENOSCOPY WITH ANESTHESIA;  Surgeon: Anupam Morales DO;  Location: [facility] ENDOSCOPY;  Service: Gastroenterology;  Laterality: N/A;  pre: abdominal pain  post: esophagitis  Vinayak Correia PA   • HERNIA REPAIR      x2 inguinal area   • KIDNEY STONE SURGERY     • KNEE ARTHROSCOPY Right 3/1/2022    Procedure: RIGHT KNEE PARTIAL LATERAL MENISCECTOMY;  Surgeon: Pedro Pablo Song MD;  Location: [facility] OR;  Service: Orthopedics;  Laterality: Right;   • KNEE SURGERY Right    • OTHER SURGICAL HISTORY      urolift   • PROSTATE SURGERY      Dr. Badillo - 2017   • ROTATOR CUFF REPAIR     • THUMB AMPUTATION Left     partial   • TOE AMPUTATION Right     big       Family History   Problem Relation Age of Onset   • Heart disease Father    • COPD Mother    • Hypertension Mother    • Asthma Mother    • No Known Problems Sister    • Colon cancer Paternal Uncle    • Prostate cancer Maternal Grandfather    • No Known Problems Sister    • Colon cancer Maternal Grandmother    •  Colon polyps Maternal Grandmother        Social History     Socioeconomic History   • Marital status:    Tobacco Use   • Smoking status: Former Smoker     Packs/day: 1.50     Years: 4.50     Pack years: 6.75     Types: Cigarettes, Cigars     Quit date:      Years since quittin.6   • Smokeless tobacco: Former User     Types: Chew     Quit date:    Vaping Use   • Vaping Use: Never used   Substance and Sexual Activity   • Alcohol use: No   • Drug use: No   • Sexual activity: Defer           Objective   Physical Exam  Vitals and nursing note reviewed.   Constitutional:       General: He is not in acute distress.     Appearance: He is well-developed. He is not diaphoretic.   HENT:      Head: Atraumatic.      Nose: Nose normal.   Eyes:      General: No scleral icterus.     Conjunctiva/sclera: Conjunctivae normal.      Pupils: Pupils are equal, round, and reactive to light.   Neck:      Thyroid: No thyromegaly.      Vascular: No JVD.   Cardiovascular:      Rate and Rhythm: Normal rate and regular rhythm.      Heart sounds: Normal heart sounds. No murmur heard.  Pulmonary:      Effort: Pulmonary effort is normal. No respiratory distress.      Breath sounds: Normal breath sounds. No wheezing or rales.   Chest:      Chest wall: Tenderness present.      Comments: Patient has reproducible chest wall pain, very tender to palpation on exam  Abdominal:      General: Bowel sounds are normal. There is no distension.      Palpations: Abdomen is soft. There is no mass.      Tenderness: There is no abdominal tenderness. There is no guarding or rebound.   Musculoskeletal:         General: Normal range of motion.      Cervical back: Normal range of motion and neck supple.   Lymphadenopathy:      Cervical: No cervical adenopathy.   Skin:     General: Skin is warm and dry.      Capillary Refill: Capillary refill takes less than 2 seconds.      Coloration: Skin is not pale.      Findings: No erythema or rash.    Neurological:      General: No focal deficit present.      Mental Status: He is alert and oriented to person, place, and time.      Cranial Nerves: No cranial nerve deficit.      Coordination: Coordination normal.      Deep Tendon Reflexes: Reflexes are normal and symmetric.   Psychiatric:         Mood and Affect: Mood normal.         Behavior: Behavior normal.         Thought Content: Thought content normal.         Judgment: Judgment normal.         Procedures           ED Course HEART Score for Major Cardiac Events - MDCalc  Calculated on Aug 14 2022 9:45 PM  5 points -> Moderate Score (4-6 points) Risk of MACE of 12-16.6%.   Labs Reviewed   COMPREHENSIVE METABOLIC PANEL - Abnormal; Notable for the following components:       Result Value    Glucose 198 (*)     CO2 20.0 (*)     All other components within normal limits    Narrative:     GFR Normal >60  Chronic Kidney Disease <60  Kidney Failure <15     PROTIME-INR - Abnormal; Notable for the following components:    INR 1.10 (*)     All other components within normal limits   CBC WITH AUTO DIFFERENTIAL - Abnormal; Notable for the following components:    Platelets 114 (*)     All other components within normal limits   COVID-19,[facility] IN-HOUSE,NASAL SWAB NO TRANSPORT MEDIA 2 HR TAT - Normal    Narrative:     Fact sheet for providers: https://www.fda.gov/media/602200/download     Fact sheet for patients: https://www.fda.gov/media/465489/download    Test performed by PCR.    Consider negative results in combination with clinical observations, patient history, and epidemiological information.   APTT - Normal   D-DIMER, QUANTITATIVE - Normal    Narrative:     Reference Range is 0-0.50 MCGFEU/mL. However, results <0.50 MCGFEU/mL tends to rule out DVT or PE. Results >0.50 MCGFEU/mL are not useful in predicting absence or presence of DVT or PE.     BNP (IN-HOUSE) - Normal    Narrative:     Among patients with dyspnea, NT-proBNP is highly sensitive for the detection  of acute congestive heart failure. In addition NT-proBNP of <300 pg/ml effectively rules out acute congestive heart failure with 99% negative predictive value.    Results may be falsely decreased if patient taking Biotin.     TROPONIN (IN-HOUSE) - Normal    Narrative:     Troponin T Reference Range:  <= 0.03 ng/mL-   Negative for AMI  >0.03 ng/mL-     Abnormal for myocardial necrosis.  Clinicians would have to utilize clinical acumen, EKG, Troponin and serial changes to determine if it is an Acute Myocardial Infarction or myocardial injury due to an underlying chronic condition.       Results may be falsely decreased if patient taking Biotin.     TROPONIN (IN-HOUSE) - Normal    Narrative:     Troponin T Reference Range:  <= 0.03 ng/mL-   Negative for AMI  >0.03 ng/mL-     Abnormal for myocardial necrosis.  Clinicians would have to utilize clinical acumen, EKG, Troponin and serial changes to determine if it is an Acute Myocardial Infarction or myocardial injury due to an underlying chronic condition.       Results may be falsely decreased if patient taking Biotin.     RAINBOW DRAW    Narrative:     The following orders were created for panel order [facility] Draw.  Procedure                               Abnormality         Status                     ---------                               -----------         ------                     Green Top (Gel)[[id number]]                                  Final result               Lavender Top[[id number]]                                     Final result               Red Top[[id number]]                                                                     Light Blue Top[[id number]]                                   Final result                 Please view results for these tests on the individual orders.   GREEN TOP   LAVENDER TOP   LIGHT BLUE TOP   CBC AND DIFFERENTIAL    Narrative:     The following orders were created for panel order CBC & Differential.  Procedure                                Abnormality         Status                     ---------                               -----------         ------                     CBC Auto Differential[[id number]]        Abnormal            Final result                 Please view results for these tests on the individual orders.     ED Course as of 08/14/22 2046   Sun Aug 14, 2022   1937 Patient reports chest pain that began today after running errands. He noted an elevated heart rate in the 120's that lasted briefly. Mentions after previous CABG he was cardioverted for atrial fibrillation on 3 separate occassions. He has had no rapid rate or afib noted in the ER. He has 2 negative cardiac markers, normal d dimer, labs otherwise unremarkable. He has elevated glucose at 198. Patient has reproducible chest pain on exam. He reports chest pain that did not respond to nitroglycerin prior to arrival. He took 2 aspirin and 1 nitro prior to arrival that did not relieve sxs. Spoke with Dr. Llamas who feels patient can be discharged home with plans to f/u with Dr. Ramey in the office. He will need to return to the ER with any acute or worsening sxs.  [TW]      ED Course User Index  [TW] Celia De La Torre, APRN                                           MDM  Number of Diagnoses or Management Options  Chest pain, unspecified type: new and requires workup     Amount and/or Complexity of Data Reviewed  Clinical lab tests: reviewed and ordered  Tests in the radiology section of CPT®: reviewed and ordered  Tests in the medicine section of CPT®: reviewed  Decide to obtain previous medical records or to obtain history from someone other than the patient: yes  Discuss the patient with other providers: yes    Risk of Complications, Morbidity, and/or Mortality  Presenting problems: moderate  Diagnostic procedures: moderate  Management options: moderate    Patient Progress  Patient progress: improved      Final diagnoses:   Chest pain, unspecified type       ED  Disposition  ED Disposition     ED Disposition   Discharge    Condition   Good    Comment   --             No follow-up provider specified.       Medication List      No changes were made to your prescriptions during this visit.          Celia De La Torre, APRN  08/14/22 [id number]

## 2022-08-15 ENCOUNTER — OFFICE VISIT (OUTPATIENT)
Dept: CARDIOLOGY | Facility: CLINIC | Age: 62
End: 2022-08-15

## 2022-08-15 VITALS
SYSTOLIC BLOOD PRESSURE: 128 MMHG | WEIGHT: 252 LBS | DIASTOLIC BLOOD PRESSURE: 82 MMHG | BODY MASS INDEX: 32.34 KG/M2 | HEART RATE: 77 BPM | HEIGHT: 74 IN | OXYGEN SATURATION: 98 %

## 2022-08-15 DIAGNOSIS — I71.20 THORACIC AORTIC ANEURYSM WITHOUT RUPTURE: ICD-10-CM

## 2022-08-15 DIAGNOSIS — G47.33 OSA ON CPAP: Chronic | ICD-10-CM

## 2022-08-15 DIAGNOSIS — Z79.4 TYPE 2 DIABETES MELLITUS WITH OTHER CIRCULATORY COMPLICATION, WITH LONG-TERM CURRENT USE OF INSULIN: Chronic | ICD-10-CM

## 2022-08-15 DIAGNOSIS — I50.32 CHRONIC DIASTOLIC CONGESTIVE HEART FAILURE: Primary | Chronic | ICD-10-CM

## 2022-08-15 DIAGNOSIS — E78.5 HYPERLIPIDEMIA LDL GOAL <70: Chronic | ICD-10-CM

## 2022-08-15 DIAGNOSIS — I65.23 BILATERAL CAROTID ARTERY STENOSIS: Chronic | ICD-10-CM

## 2022-08-15 DIAGNOSIS — Z99.89 OSA ON CPAP: Chronic | ICD-10-CM

## 2022-08-15 DIAGNOSIS — Z95.1 S/P CABG X 2: Chronic | ICD-10-CM

## 2022-08-15 DIAGNOSIS — Z79.01 CHRONIC ANTICOAGULATION: Chronic | ICD-10-CM

## 2022-08-15 DIAGNOSIS — I10 PRIMARY HYPERTENSION: Chronic | ICD-10-CM

## 2022-08-15 DIAGNOSIS — E11.59 TYPE 2 DIABETES MELLITUS WITH OTHER CIRCULATORY COMPLICATION, WITH LONG-TERM CURRENT USE OF INSULIN: Chronic | ICD-10-CM

## 2022-08-15 DIAGNOSIS — I25.110 CORONARY ARTERY DISEASE INVOLVING NATIVE CORONARY ARTERY OF NATIVE HEART WITH UNSTABLE ANGINA PECTORIS: ICD-10-CM

## 2022-08-15 DIAGNOSIS — E66.09 CLASS 1 OBESITY DUE TO EXCESS CALORIES WITH SERIOUS COMORBIDITY AND BODY MASS INDEX (BMI) OF 32.0 TO 32.9 IN ADULT: ICD-10-CM

## 2022-08-15 DIAGNOSIS — I48.0 PAROXYSMAL ATRIAL FIBRILLATION: Chronic | ICD-10-CM

## 2022-08-15 PROCEDURE — 99214 OFFICE O/P EST MOD 30 MIN: CPT | Performed by: NURSE PRACTITIONER

## 2022-08-15 PROCEDURE — 93000 ELECTROCARDIOGRAM COMPLETE: CPT | Performed by: NURSE PRACTITIONER

## 2022-08-15 RX ORDER — RANOLAZINE 500 MG/1
500 TABLET, EXTENDED RELEASE ORAL 2 TIMES DAILY
Qty: 180 TABLET | Refills: 3 | Status: SHIPPED | OUTPATIENT
Start: 2022-08-15 | End: 2022-09-26 | Stop reason: SDUPTHER

## 2022-08-15 NOTE — PROGRESS NOTES
Chief Complaint  Congestive Heart Failure and Rapid Heart Rate (ER D/C F/U. )    Subjective          Carlos A Boyer Jr. presents to [facility] CARDIOLOGY for routine follow-up of emergency room visit on 8/14/2022 for chest pain.  EKG on arrival to ED revealed sinus rhythm with PACs and no acute ST-T changes.  Serial troponin levels were normal.  BNP was normal.  Other lab work was unremarkable.  Chest x-ray revealed vascular crowding in the lung bases with mild atelectasis in the left lung base.  He reports an episode of palpitations and chest pain this am after leaving the ED. He has chronic diastolic congestive heart failure, coronary artery disease status post coronary artery stenting and CABG x2 (LIMA to LAD and SVG to OM) 7/6/2019, paroxysmal atrial fibrillation status post cardioversion on chronic anticoagulation, hypertension, hyperlipidemia, type 2 diabetes mellitus, peripheral vascular disease, cerebrovascular accident, obstructive sleep apnea and obesity. He complains of a several day history of dyspnea with mild exertion. He reports improvement in bilateral lower extremity edema since starting Entresto. Patient denies dizziness, syncope, orthopnea, PND or decreased stamina.  Patient denies any signs of bleeding.    Coronary Artery Disease  Presents for follow-up visit. Symptoms include chest pain, leg swelling, palpitations and shortness of breath. Pertinent negatives include no chest pressure, chest tightness, dizziness, muscle weakness or weight gain. Risk factors include hyperlipidemia and obesity. Risk factors do not include hypertension. His past medical history is significant for CHF. The symptoms have been stable. Compliance with diet is variable. Compliance with exercise is variable. Compliance with medications is good.   Atrial Fibrillation  Presents for follow-up visit. Symptoms include chest pain, palpitations and shortness of breath. Symptoms are negative for an AICD  problem, bradycardia, dizziness, hemodynamic instability, hypertension, hypotension, pacemaker problem, syncope, tachycardia and weakness. The symptoms have been stable. Past medical history includes atrial fibrillation, CAD, CHF and hyperlipidemia. There are no medication compliance problems.   Hypertension  This is a chronic problem. The current episode started more than 1 year ago. The problem is controlled. Associated symptoms include chest pain, palpitations and shortness of breath. Pertinent negatives include no anxiety, blurred vision, headaches, malaise/fatigue, neck pain, orthopnea, peripheral edema, PND or sweats. Risk factors for coronary artery disease include male gender, obesity, diabetes mellitus and dyslipidemia. Current antihypertension treatment includes ACE inhibitors, diuretics and direct vasodilators. The current treatment provides significant improvement. Hypertensive end-organ damage includes CAD/MI, CVA and PVD. Identifiable causes of hypertension include sleep apnea.   Hyperlipidemia  This is a chronic problem. The current episode started more than 1 year ago. Exacerbating diseases include diabetes and obesity. Associated symptoms include chest pain and shortness of breath. Current antihyperlipidemic treatment includes statins. Risk factors for coronary artery disease include male sex, obesity, hypertension, dyslipidemia and diabetes mellitus.   Congestive Heart Failure  Presents for follow-up visit. Associated symptoms include chest pain, edema, palpitations and shortness of breath. Pertinent negatives include no abdominal pain, chest pressure, claudication, fatigue, muscle weakness, near-syncope, nocturia, orthopnea, paroxysmal nocturnal dyspnea or unexpected weight change. The symptoms have been stable. His past medical history is significant for CAD. Compliance with total regimen is 51-75%. Compliance with diet is 51-75%. Compliance with exercise is 51-75%. Compliance with medications is  "%.        Objective   Vital Signs:   /82   Pulse 77   Ht 188 cm (74\")   Wt 114 kg (252 lb)   SpO2 98%   BMI 32.35 kg/m²     Vitals and nursing note reviewed.   Constitutional:       General: Awake.      Appearance: Normal and healthy appearance. Well-developed and not in distress. Obese.   Eyes:      General: Lids are normal.      Conjunctiva/sclera: Conjunctivae normal.      Pupils: Pupils are equal, round, and reactive to light.   HENT:      Head: Normocephalic and atraumatic.      Nose: Nose normal.   Neck:      Vascular: No JVR. JVD normal.   Pulmonary:      Effort: Pulmonary effort is normal.      Breath sounds: Examination of the right-upper field reveals decreased breath sounds. Examination of the left-upper field reveals decreased breath sounds. Examination of the right-middle field reveals decreased breath sounds. Examination of the left-middle field reveals decreased breath sounds. Examination of the right-lower field reveals decreased breath sounds. Examination of the left-lower field reveals decreased breath sounds. Decreased breath sounds present. No wheezing. No rhonchi. No rales.   Chest:      Chest wall: Not tender to palpatation.   Cardiovascular:      PMI at left midclavicular line. Normal rate. Irregularly irregular rhythm. Normal S1. Normal S2.      Murmurs: There is a grade 2/6 harsh midsystolic murmur at the URSB, radiating to the neck. There is a grade 2/4 high frequency blowing decrescendo, early diastolic murmur at the URSB, radiating to the apex.      No gallop. No click. No rub.   Pulses:     Intact distal pulses.   Edema:     Peripheral edema absent.   Abdominal:      General: Bowel sounds are normal.      Palpations: Abdomen is soft.      Tenderness: There is no abdominal tenderness.   Musculoskeletal: Normal range of motion.         General: No tenderness.      Cervical back: Normal range of motion. Skin:     General: Skin is warm and dry.   Neurological:      General: " No focal deficit present.      Mental Status: Alert, oriented to person, place, and time and oriented to person, place and time.   Psychiatric:         Attention and Perception: Attention and perception normal.         Mood and Affect: Mood and affect normal.         Speech: Speech normal.         Behavior: Behavior normal. Behavior is cooperative.         Thought Content: Thought content normal.         Cognition and Memory: Cognition and memory normal.         Judgment: Judgment normal.        Result Review :   The following data was reviewed by: TEENA Buckner on 8/15/2022:  Common labs    Common Labsle 3/30/22 3/30/22 7/9/22 7/9/22 8/14/22 8/14/22    0742 0742 [id number] [id number] 1655   Glucose  196 (A)  123 (A)  198 (A)   BUN  19  18  18   Creatinine  1.20  1.19  1.10   Sodium  141  139  139   Potassium  4.8  5.0  4.7   Chloride  107  108 (A)  106   Calcium  9.2  9.5  9.5   Albumin    4.40  4.30   Total Bilirubin    0.7  0.6   Alkaline Phosphatase    91  85   AST (SGOT)    20  21   ALT (SGPT)    23  23   WBC 5.45  5.34  5.54    Hemoglobin 13.8  15.3  13.6    Hematocrit 43.0  46.4  40.8    Platelets 115 (A)  127 (A)  114 (A)    (A) Abnormal value       Comments are available for some flowsheets but are not being displayed.           Data reviewed: Cardiology studies 2d echo 4/29/21     ECG 12 Lead    Date/Time: 8/15/2022 2:50 PM  Performed by: Agnieszka Jeff APRN  Authorized by: Agnieszka Jeff APRN   Comparison: compared with previous ECG from 8/14/2022  Rhythm: atrial fibrillation  Rate: normal  BPM: 88    Clinical impression: abnormal EKG              Assessment and Plan    Diagnoses and all orders for this visit:    1. Chronic diastolic congestive heart failure (HCC) (Primary)-NYHA class II.  Compensated.  Reviewed signs and symptoms of CHF and what to report with the patient. Patient instructed to restrict sodium and weigh daily. Report weight gain of greater than 2 lbs overnight or 5 lbs in 1  week. Pt verbalized understanding of instructions and plan of care.     2. Coronary artery disease involving native coronary artery of native heart with unstable angina pectoris (HCC)- Continue Imdur. Start Ranexa 500 mg twice daily. Check lexiscan.     3. S/P CABG x 2-LIMA to LAD and SVG to OM 7/6/2019 per Dr. Steven Tang at [facility].  Patient continues on aspirin.  Denies bleeding.    4. Paroxysmal atrial fibrillation (HCC)- in afib on EKG today. Rate controlled. Anticoagulated. Continue metoprolol tartrate. Check 14 day holter monitor.     5. Chronic anticoagulation-continue Eliquis.  Patient denies bleeding.    6. Primary hypertension-blood pressure is well controlled.  Continue metoprolol tartrate and Entresto.  Monitor and record daily blood pressure. Report readings consistently higher than 130/90 or consistently lower than 100/60.     7. Hyperlipidemia LDL goal <70-management per PCP.  Continue Crestor and Tricor.    8. Bilateral carotid artery stenosis-patient follows with Dr. Chetan Echavarria with vascular surgery.  Stable.    9. Type 2 diabetes mellitus with other circulatory complication, with long-term current use of insulin (HCC)-management per PCP.  Type 2 diabetes mellitus in the setting of obstructive coronary artery disease.  Continue Jardiance.    10. HOA on CPAP-patient reports compliance with CPAP.  Stable.    11. Class 1 obesity due to excess calories with serious comorbidity and body mass index (BMI) of 32.0 to 32.9 in adult- Patient's Body mass index is 32.35 kg/m². indicating that he is obese (BMI >30). Obesity-related health conditions include the following: obstructive sleep apnea, hypertension, coronary heart disease, diabetes mellitus, dyslipidemias and peripheral vascular disease. Obesity is unchanged. BMI is is above average; no BMI management plan is appropriate. We discussed low calorie, low carb based diet program, portion control and increasing exercise.    12.  Thoracic aortic aneurysm without rupture (HCC)-patient follows with Dr. Steven Tang with CT surgery for surveillance of 4.4 cm thoracic aortic aneurysm.  Stable.      Follow Up   Return in about 4 weeks (around 9/12/2022) for Next scheduled follow up.  Patient was given instructions and counseling regarding his condition or for health maintenance advice. Please see specific information pulled into the AVS if appropriate.

## 2022-08-16 LAB
QT INTERVAL: 392 MS
QTC INTERVAL: 484 MS

## 2022-08-23 DIAGNOSIS — G40.909 SEIZURE DISORDER: ICD-10-CM

## 2022-08-23 RX ORDER — LAMOTRIGINE 200 MG/1
200 TABLET ORAL 2 TIMES DAILY
Qty: 180 TABLET | Refills: 1 | Status: SHIPPED | OUTPATIENT
Start: 2022-08-23

## 2022-08-23 RX ORDER — LEVETIRACETAM 500 MG/1
TABLET ORAL
Qty: 630 TABLET | Refills: 1 | Status: CANCELLED | OUTPATIENT
Start: 2022-08-23

## 2022-08-23 NOTE — TELEPHONE ENCOUNTER
Caller: solomon Patel call back number: [phone]    Requested Prescriptions:     keppra  500 mg     LAMOTRIGINE  200 MG       Requested Prescriptions      No prescriptions requested or ordered in this encounter        Pharmacy where request should be sent: [facility] - [place] - [id number] [address] [facility] - [phone]  - [phone]   [phone]     Additional details provided by patient:      Does the patient have less than a 3 day supply:  [] Yes  [x] No    PLEASE ADVISE     Tyrone KHAN Rep   08/23/22 11:31 CDT

## 2022-09-09 DIAGNOSIS — G40.909 SEIZURE DISORDER: ICD-10-CM

## 2022-09-09 RX ORDER — LEVETIRACETAM 500 MG/1
TABLET ORAL
Qty: 630 TABLET | Refills: 1 | Status: SHIPPED | OUTPATIENT
Start: 2022-09-09

## 2022-09-21 ENCOUNTER — HOSPITAL ENCOUNTER (OUTPATIENT)
Dept: CARDIOLOGY | Facility: HOSPITAL | Age: 62
Discharge: HOME | End: 2022-09-21

## 2022-09-21 VITALS
BODY MASS INDEX: 34.34 KG/M2 | HEART RATE: 58 BPM | WEIGHT: 253.53 LBS | DIASTOLIC BLOOD PRESSURE: 73 MMHG | SYSTOLIC BLOOD PRESSURE: 137 MMHG | HEIGHT: 72 IN

## 2022-09-21 DIAGNOSIS — I25.110 CORONARY ARTERY DISEASE INVOLVING NATIVE CORONARY ARTERY OF NATIVE HEART WITH UNSTABLE ANGINA PECTORIS: ICD-10-CM

## 2022-09-21 LAB
BH CV NUCLEAR PRIOR STUDY: 3
BH CV REST NUCLEAR ISOTOPE DOSE: 9.8 MCI
BH CV STRESS BP STAGE 1: NORMAL
BH CV STRESS COMMENTS STAGE 1: NORMAL
BH CV STRESS DOSE REGADENOSON STAGE 1: 0.4
BH CV STRESS DURATION MIN STAGE 1: 0
BH CV STRESS DURATION SEC STAGE 1: 10
BH CV STRESS HR STAGE 1: 73
BH CV STRESS NUCLEAR ISOTOPE DOSE: 28.5 MCI
BH CV STRESS PROTOCOL 1: NORMAL
BH CV STRESS RECOVERY BP: NORMAL MMHG
BH CV STRESS RECOVERY HR: 71 BPM
BH CV STRESS STAGE 1: 1
LV EF NUC BP: 68 %
MAXIMAL PREDICTED HEART RATE: 156 BPM
PERCENT MAX PREDICTED HR: 46.79 %
STRESS BASELINE BP: NORMAL MMHG
STRESS BASELINE HR: 58 BPM
STRESS PERCENT HR: 55 %
STRESS POST EXERCISE DUR MIN: 0 MIN
STRESS POST EXERCISE DUR SEC: 10 SEC
STRESS POST PEAK BP: NORMAL MMHG
STRESS POST PEAK HR: 73 BPM
STRESS TARGET HR: 133 BPM

## 2022-09-21 PROCEDURE — A9502 TC99M TETROFOSMIN: HCPCS | Performed by: NURSE PRACTITIONER

## 2022-09-21 PROCEDURE — 25010000002 REGADENOSON 0.4 MG/5ML SOLUTION: Performed by: INTERNAL MEDICINE

## 2022-09-21 PROCEDURE — 78452 HT MUSCLE IMAGE SPECT MULT: CPT | Performed by: INTERNAL MEDICINE

## 2022-09-21 PROCEDURE — 0 TECHNETIUM TETROFOSMIN KIT: Performed by: NURSE PRACTITIONER

## 2022-09-21 PROCEDURE — 93017 CV STRESS TEST TRACING ONLY: CPT

## 2022-09-21 PROCEDURE — 93018 CV STRESS TEST I&R ONLY: CPT | Performed by: INTERNAL MEDICINE

## 2022-09-21 PROCEDURE — 78452 HT MUSCLE IMAGE SPECT MULT: CPT

## 2022-09-21 RX ADMIN — TETROFOSMIN 1 DOSE: 1.38 INJECTION, POWDER, LYOPHILIZED, FOR SOLUTION INTRAVENOUS at 09:15

## 2022-09-21 RX ADMIN — REGADENOSON 0.4 MG: 0.08 INJECTION, SOLUTION INTRAVENOUS at 10:22

## 2022-09-21 RX ADMIN — TETROFOSMIN 1 DOSE: 1.38 INJECTION, POWDER, LYOPHILIZED, FOR SOLUTION INTRAVENOUS at 11:50

## 2022-09-26 ENCOUNTER — OFFICE VISIT (OUTPATIENT)
Dept: CARDIOLOGY | Facility: CLINIC | Age: 62
End: 2022-09-26

## 2022-09-26 ENCOUNTER — PREP FOR SURGERY (OUTPATIENT)
Dept: OTHER | Facility: HOSPITAL | Age: 62
End: 2022-09-26

## 2022-09-26 VITALS
BODY MASS INDEX: 35.49 KG/M2 | HEIGHT: 72 IN | DIASTOLIC BLOOD PRESSURE: 98 MMHG | SYSTOLIC BLOOD PRESSURE: 144 MMHG | WEIGHT: 262 LBS | OXYGEN SATURATION: 98 % | HEART RATE: 82 BPM

## 2022-09-26 DIAGNOSIS — I25.110 CORONARY ARTERY DISEASE INVOLVING NATIVE CORONARY ARTERY OF NATIVE HEART WITH UNSTABLE ANGINA PECTORIS: Primary | ICD-10-CM

## 2022-09-26 DIAGNOSIS — I48.0 PAROXYSMAL ATRIAL FIBRILLATION: Chronic | ICD-10-CM

## 2022-09-26 DIAGNOSIS — I71.20 THORACIC AORTIC ANEURYSM WITHOUT RUPTURE: ICD-10-CM

## 2022-09-26 DIAGNOSIS — Z99.89 OSA ON CPAP: Chronic | ICD-10-CM

## 2022-09-26 DIAGNOSIS — E11.59 TYPE 2 DIABETES MELLITUS WITH OTHER CIRCULATORY COMPLICATION, WITH LONG-TERM CURRENT USE OF INSULIN: Chronic | ICD-10-CM

## 2022-09-26 DIAGNOSIS — I65.23 BILATERAL CAROTID ARTERY STENOSIS: Chronic | ICD-10-CM

## 2022-09-26 DIAGNOSIS — I50.32 CHRONIC DIASTOLIC CONGESTIVE HEART FAILURE: Primary | Chronic | ICD-10-CM

## 2022-09-26 DIAGNOSIS — I10 PRIMARY HYPERTENSION: Chronic | ICD-10-CM

## 2022-09-26 DIAGNOSIS — E78.5 HYPERLIPIDEMIA LDL GOAL <70: Chronic | ICD-10-CM

## 2022-09-26 DIAGNOSIS — G47.33 OSA ON CPAP: Chronic | ICD-10-CM

## 2022-09-26 DIAGNOSIS — I25.110 CORONARY ARTERY DISEASE INVOLVING NATIVE CORONARY ARTERY OF NATIVE HEART WITH UNSTABLE ANGINA PECTORIS: ICD-10-CM

## 2022-09-26 DIAGNOSIS — E66.09 CLASS 1 OBESITY DUE TO EXCESS CALORIES WITH SERIOUS COMORBIDITY AND BODY MASS INDEX (BMI) OF 34.0 TO 34.9 IN ADULT: ICD-10-CM

## 2022-09-26 DIAGNOSIS — Z79.01 CHRONIC ANTICOAGULATION: Chronic | ICD-10-CM

## 2022-09-26 DIAGNOSIS — Z79.4 TYPE 2 DIABETES MELLITUS WITH OTHER CIRCULATORY COMPLICATION, WITH LONG-TERM CURRENT USE OF INSULIN: Chronic | ICD-10-CM

## 2022-09-26 DIAGNOSIS — Z95.1 S/P CABG X 2: Chronic | ICD-10-CM

## 2022-09-26 PROCEDURE — 99214 OFFICE O/P EST MOD 30 MIN: CPT | Performed by: NURSE PRACTITIONER

## 2022-09-26 RX ORDER — ONDANSETRON 2 MG/ML
4 INJECTION INTRAMUSCULAR; INTRAVENOUS EVERY 6 HOURS PRN
Status: CANCELLED | OUTPATIENT
Start: 2022-09-26

## 2022-09-26 RX ORDER — SODIUM CHLORIDE 9 MG/ML
1-3 INJECTION, SOLUTION INTRAVENOUS CONTINUOUS
Status: CANCELLED | OUTPATIENT
Start: 2022-09-26

## 2022-09-26 RX ORDER — SODIUM CHLORIDE 0.9 % (FLUSH) 0.9 %
10 SYRINGE (ML) INJECTION EVERY 12 HOURS SCHEDULED
Status: CANCELLED | OUTPATIENT
Start: 2022-09-26

## 2022-09-26 RX ORDER — NITROGLYCERIN 0.4 MG/1
0.4 TABLET SUBLINGUAL
Status: CANCELLED | OUTPATIENT
Start: 2022-09-26

## 2022-09-26 RX ORDER — SODIUM CHLORIDE 0.9 % (FLUSH) 0.9 %
10 SYRINGE (ML) INJECTION AS NEEDED
Status: CANCELLED | OUTPATIENT
Start: 2022-09-26

## 2022-09-26 RX ORDER — RANOLAZINE 1000 MG/1
1000 TABLET, EXTENDED RELEASE ORAL 2 TIMES DAILY
Qty: 60 TABLET | Refills: 11 | Status: ON HOLD | OUTPATIENT
Start: 2022-09-26 | End: 2023-01-24

## 2022-09-26 NOTE — PROGRESS NOTES
Chief Complaint  Congestive Heart Failure (4wk. ), Coronary Artery Disease (4wk F/U), Paroxysmal Afib, and Results (Sahra/Holter)    Subjective          Carlos A Boyer Jr. presents to [facility] CARDIOLOGY [id number] for routine follow-up of medication adjustment and outpatient testing.  He was started on Ranexa 500 mg twice daily at his last office visit on 8/15/2022.  Sahra scan on 9/21/2022 revealed moderate apical and apical lateral ischemia.  14-day Holter monitor revealed predominantly sinus rhythm with rare supraventricular ectopic beats with APC burden of less than 1%, rare premature ventricular contractions with a PVC burden of less than 1%, single 7 beat run of SVT, no significant ectopy and no significant pauses.  He has chronic diastolic congestive heart failure, coronary artery disease status post coronary artery stenting and CABG x2 (LIMA to LAD and SVG to OM) 7/6/2019, paroxysmal atrial fibrillation status post cardioversion on chronic anticoagulation, hypertension, hyperlipidemia, type 2 diabetes mellitus, peripheral vascular disease, cerebrovascular accident, obstructive sleep apnea and obesity.  He continues to complain of dyspnea with mild exertion and exertional chest pain.  He reports intermittent bilateral extremity edema that improved significantly after starting Entresto.  Patient denies dizziness, syncope, orthopnea, PND or decreased stamina.  Patient denies any signs of bleeding.    Coronary Artery Disease  Presents for follow-up visit. Symptoms include chest pain, leg swelling, palpitations and shortness of breath. Pertinent negatives include no chest pressure, chest tightness, dizziness, muscle weakness or weight gain. Risk factors include hyperlipidemia and obesity. Risk factors do not include hypertension. His past medical history is significant for CHF. The symptoms have been stable. Compliance with diet is variable. Compliance with exercise is variable. Compliance  with medications is good.   Atrial Fibrillation  Presents for follow-up visit. Symptoms include chest pain, palpitations and shortness of breath. Symptoms are negative for an AICD problem, bradycardia, dizziness, hemodynamic instability, hypertension, hypotension, pacemaker problem, syncope, tachycardia and weakness. The symptoms have been stable. Past medical history includes atrial fibrillation, CAD, CHF and hyperlipidemia. There are no medication compliance problems.   Hypertension  This is a chronic problem. The current episode started more than 1 year ago. The problem is controlled. Associated symptoms include chest pain, palpitations and shortness of breath. Pertinent negatives include no anxiety, blurred vision, headaches, malaise/fatigue, neck pain, orthopnea, peripheral edema, PND or sweats. Risk factors for coronary artery disease include male gender, obesity, diabetes mellitus and dyslipidemia. Current antihypertension treatment includes ACE inhibitors, diuretics and direct vasodilators. The current treatment provides significant improvement. Hypertensive end-organ damage includes CAD/MI, CVA and PVD. Identifiable causes of hypertension include sleep apnea.   Hyperlipidemia  This is a chronic problem. The current episode started more than 1 year ago. Exacerbating diseases include diabetes and obesity. Associated symptoms include chest pain and shortness of breath. Current antihyperlipidemic treatment includes statins. Risk factors for coronary artery disease include male sex, obesity, hypertension, dyslipidemia and diabetes mellitus.   Congestive Heart Failure  Presents for follow-up visit. Associated symptoms include chest pain, edema, palpitations and shortness of breath. Pertinent negatives include no abdominal pain, chest pressure, claudication, fatigue, muscle weakness, near-syncope, nocturia, orthopnea, paroxysmal nocturnal dyspnea or unexpected weight change. The symptoms have been stable. His past  "medical history is significant for CAD. Compliance with total regimen is 51-75%. Compliance with diet is 51-75%. Compliance with exercise is 51-75%. Compliance with medications is %.      I have reviewed and confirmed the accuracy of the TEENA Wynne      Objective   Vital Signs:   /98   Pulse 82   Ht 182.9 cm (72\")   Wt 119 kg (262 lb)   SpO2 98%   BMI 35.53 kg/m²     Vitals and nursing note reviewed.   Constitutional:       General: Awake.      Appearance: Normal and healthy appearance. Well-developed and not in distress. Obese.   Eyes:      General: Lids are normal.      Conjunctiva/sclera: Conjunctivae normal.      Pupils: Pupils are equal, round, and reactive to light.   HENT:      Head: Normocephalic and atraumatic.      Nose: Nose normal.   Neck:      Vascular: No JVR. JVD normal.   Pulmonary:      Effort: Pulmonary effort is normal.      Breath sounds: Examination of the right-upper field reveals decreased breath sounds. Examination of the left-upper field reveals decreased breath sounds. Examination of the right-middle field reveals decreased breath sounds. Examination of the left-middle field reveals decreased breath sounds. Examination of the right-lower field reveals decreased breath sounds. Examination of the left-lower field reveals decreased breath sounds. Decreased breath sounds present. No wheezing. No rhonchi. No rales.   Chest:      Chest wall: Not tender to palpatation.   Cardiovascular:      PMI at left midclavicular line. Normal rate. Regular rhythm. Normal S1. Normal S2.      Murmurs: There is a grade 2/6 harsh midsystolic murmur at the URSB, radiating to the neck. There is a grade 2/4 high frequency blowing decrescendo, early diastolic murmur at the URSB, radiating to the apex.      No gallop. No click. No rub.   Pulses:     Intact distal pulses.   Edema:     Peripheral edema absent.   Abdominal:      General: Bowel sounds are normal.      Palpations: Abdomen is " soft.      Tenderness: There is no abdominal tenderness.   Musculoskeletal: Normal range of motion.         General: No tenderness.      Cervical back: Normal range of motion. Skin:     General: Skin is warm and dry.   Neurological:      General: No focal deficit present.      Mental Status: Alert, oriented to person, place, and time and oriented to person, place and time.   Psychiatric:         Attention and Perception: Attention and perception normal.         Mood and Affect: Mood and affect normal.         Speech: Speech normal.         Behavior: Behavior normal. Behavior is cooperative.         Thought Content: Thought content normal.         Cognition and Memory: Cognition and memory normal.         Judgment: Judgment normal.        Result Review :   The following data was reviewed by: TEENA Buckner on 9/26/2022:  Common labs    Common Labs 3/30/22 3/30/22 7/9/22 7/9/22 8/14/22 8/14/22    0742 0742 0858 [id number] [id number] 1655   Glucose  196 (A)  123 (A)  198 (A)   BUN  19  18  18   Creatinine  1.20  1.19  1.10   Sodium  141  139  139   Potassium  4.8  5.0  4.7   Chloride  107  108 (A)  106   Calcium  9.2  9.5  9.5   Albumin    4.40  4.30   Total Bilirubin    0.7  0.6   Alkaline Phosphatase    91  85   AST (SGOT)    20  21   ALT (SGPT)    23  23   WBC 5.45  5.34  5.54    Hemoglobin 13.8  15.3  13.6    Hematocrit 43.0  46.4  40.8    Platelets 115 (A)  127 (A)  114 (A)    (A) Abnormal value       Comments are available for some flowsheets but are not being displayed.           Data reviewed: Cardiology studies 2d echo 4/29/21, Holter monitor 9/11/2022, and Sahra scan 9/21/2022         Assessment and Plan    Diagnoses and all orders for this visit:    1. Chronic diastolic congestive heart failure (HCC) (Primary)-NYHA class II.  Compensated.  Reviewed signs and symptoms of CHF and what to report with the patient. Patient instructed to restrict sodium and weigh daily. Report weight gain of greater than 2 lbs  overnight or 5 lbs in 1 week. Pt verbalized understanding of instructions and plan of care.  Continue Entresto, Jardiance and metoprolol tartrate.    2. Coronary artery disease involving native coronary artery of native heart with unstable angina pectoris (HCC)- Continue Imdur.  Increase Ranexa to 1000 mg twice daily.  Abnormal Sahra scan 9/21/2022.  Patient to be scheduled for left heart catheterization.    3. S/P CABG x 2-LIMA to LAD and SVG to OM 7/6/2019 per Dr. Steven Tang at [facility].  Patient continues on aspirin.  Denies bleeding.    4. Paroxysmal atrial fibrillation (HCC)- no recurrence on recent Holter monitor.  Anticoagulated.  Stable.  Continue metoprolol tartrate.     5. Chronic anticoagulation-continue Eliquis.  Patient denies bleeding.    6. Primary hypertension-blood pressure is elevated in office today. Pt reports consistently lower than 130/90 at home. Continue metoprolol tartrate and Entresto.  Monitor and record daily blood pressure. Report readings consistently higher than 130/90 or consistently lower than 100/60.     7. Hyperlipidemia LDL goal <70-management per PCP.  Continue Crestor and Tricor.    8. Bilateral carotid artery stenosis-patient follows with Dr. Chetan Echavarria with vascular surgery.  Stable.    9. Type 2 diabetes mellitus with other circulatory complication, with long-term current use of insulin (HCC)-management per PCP.  Type 2 diabetes mellitus in the setting of obstructive coronary artery disease.  Continue Jardiance.    10. HOA on CPAP-patient reports compliance with CPAP.  Stable.    11. Class 1 obesity due to excess calories with serious comorbidity and body mass index (BMI) of 34.0 to 34.9 in adult- Patient's Body mass index is 35.53 kg/m². indicating that he is obese (BMI >30). Obesity-related health conditions include the following: obstructive sleep apnea, hypertension, coronary heart disease, diabetes mellitus, dyslipidemias and peripheral vascular  disease. Obesity is unchanged. BMI is is above average; no BMI management plan is appropriate. We discussed low calorie, low carb based diet program, portion control and increasing exercise.    12. Thoracic aortic aneurysm without rupture (HCC)-patient follows with Dr. Steven Tang with CT surgery for surveillance of 4.4 cm thoracic aortic aneurysm.  Stable.      Follow Up   Return in about 4 weeks (around 10/24/2022) for Next scheduled follow up.  Patient was given instructions and counseling regarding his condition or for health maintenance advice. Please see specific information pulled into the AVS if appropriate.

## 2022-10-20 ENCOUNTER — APPOINTMENT (OUTPATIENT)
Dept: GENERAL RADIOLOGY | Facility: HOSPITAL | Age: 62
End: 2022-10-20

## 2022-10-20 ENCOUNTER — HOSPITAL ENCOUNTER (EMERGENCY)
Facility: HOSPITAL | Age: 62
Discharge: HOME | End: 2022-10-20
Attending: STUDENT IN AN ORGANIZED HEALTH CARE EDUCATION/TRAINING PROGRAM | Admitting: STUDENT IN AN ORGANIZED HEALTH CARE EDUCATION/TRAINING PROGRAM

## 2022-10-20 VITALS
OXYGEN SATURATION: 94 % | HEART RATE: 80 BPM | BODY MASS INDEX: 34.67 KG/M2 | SYSTOLIC BLOOD PRESSURE: 135 MMHG | RESPIRATION RATE: 18 BRPM | DIASTOLIC BLOOD PRESSURE: 79 MMHG | WEIGHT: 256 LBS | TEMPERATURE: 98.1 F | HEIGHT: 72 IN

## 2022-10-20 DIAGNOSIS — R07.9 CHEST PAIN, UNSPECIFIED TYPE: Primary | ICD-10-CM

## 2022-10-20 LAB
ANION GAP SERPL CALCULATED.3IONS-SCNC: 12 MMOL/L (ref 5–15)
BASOPHILS # BLD AUTO: 0.03 10*3/MM3 (ref 0–0.2)
BASOPHILS NFR BLD AUTO: 0.6 % (ref 0–1.5)
BUN SERPL-MCNC: 26 MG/DL (ref 8–23)
BUN/CREAT SERPL: 18.4 (ref 7–25)
CALCIUM SPEC-SCNC: 9.5 MG/DL (ref 8.6–10.5)
CHLORIDE SERPL-SCNC: 103 MMOL/L (ref 98–107)
CO2 SERPL-SCNC: 25 MMOL/L (ref 22–29)
CREAT SERPL-MCNC: 1.41 MG/DL (ref 0.76–1.27)
D DIMER PPP FEU-MCNC: 0.37 MCGFEU/ML (ref 0–0.5)
DEPRECATED RDW RBC AUTO: 41.8 FL (ref 37–54)
EGFRCR SERPLBLD CKD-EPI 2021: 55.6 ML/MIN/1.73
EOSINOPHIL # BLD AUTO: 0.14 10*3/MM3 (ref 0–0.4)
EOSINOPHIL NFR BLD AUTO: 2.6 % (ref 0.3–6.2)
ERYTHROCYTE [DISTWIDTH] IN BLOOD BY AUTOMATED COUNT: 12.7 % (ref 12.3–15.4)
GLUCOSE SERPL-MCNC: 208 MG/DL (ref 65–99)
HCT VFR BLD AUTO: 44.3 % (ref 37.5–51)
HGB BLD-MCNC: 14.9 G/DL (ref 13–17.7)
LYMPHOCYTES # BLD AUTO: 2.13 10*3/MM3 (ref 0.7–3.1)
LYMPHOCYTES NFR BLD AUTO: 39.5 % (ref 19.6–45.3)
MCH RBC QN AUTO: 30.3 PG (ref 26.6–33)
MCHC RBC AUTO-ENTMCNC: 33.6 G/DL (ref 31.5–35.7)
MCV RBC AUTO: 90 FL (ref 79–97)
MONOCYTES # BLD AUTO: 0.4 10*3/MM3 (ref 0.1–0.9)
MONOCYTES NFR BLD AUTO: 7.4 % (ref 5–12)
NEUTROPHILS NFR BLD AUTO: 2.67 10*3/MM3 (ref 1.7–7)
NEUTROPHILS NFR BLD AUTO: 49.5 % (ref 42.7–76)
NT-PROBNP SERPL-MCNC: 121.5 PG/ML (ref 0–900)
PLATELET # BLD AUTO: 143 10*3/MM3 (ref 140–450)
PMV BLD AUTO: 11 FL (ref 6–12)
POTASSIUM SERPL-SCNC: 4.3 MMOL/L (ref 3.5–5.2)
RBC # BLD AUTO: 4.92 10*6/MM3 (ref 4.14–5.8)
SODIUM SERPL-SCNC: 140 MMOL/L (ref 136–145)
TROPONIN T SERPL-MCNC: <0.01 NG/ML (ref 0–0.03)
TROPONIN T SERPL-MCNC: <0.01 NG/ML (ref 0–0.03)
WBC NRBC COR # BLD: 5.39 10*3/MM3 (ref 3.4–10.8)

## 2022-10-20 PROCEDURE — 71045 X-RAY EXAM CHEST 1 VIEW: CPT

## 2022-10-20 PROCEDURE — 83880 ASSAY OF NATRIURETIC PEPTIDE: CPT | Performed by: STUDENT IN AN ORGANIZED HEALTH CARE EDUCATION/TRAINING PROGRAM

## 2022-10-20 PROCEDURE — 80048 BASIC METABOLIC PNL TOTAL CA: CPT | Performed by: STUDENT IN AN ORGANIZED HEALTH CARE EDUCATION/TRAINING PROGRAM

## 2022-10-20 PROCEDURE — 36415 COLL VENOUS BLD VENIPUNCTURE: CPT

## 2022-10-20 PROCEDURE — 99283 EMERGENCY DEPT VISIT LOW MDM: CPT

## 2022-10-20 PROCEDURE — 84484 ASSAY OF TROPONIN QUANT: CPT | Performed by: STUDENT IN AN ORGANIZED HEALTH CARE EDUCATION/TRAINING PROGRAM

## 2022-10-20 PROCEDURE — 93005 ELECTROCARDIOGRAM TRACING: CPT | Performed by: STUDENT IN AN ORGANIZED HEALTH CARE EDUCATION/TRAINING PROGRAM

## 2022-10-20 PROCEDURE — 85025 COMPLETE CBC W/AUTO DIFF WBC: CPT | Performed by: STUDENT IN AN ORGANIZED HEALTH CARE EDUCATION/TRAINING PROGRAM

## 2022-10-20 PROCEDURE — 93010 ELECTROCARDIOGRAM REPORT: CPT | Performed by: INTERNAL MEDICINE

## 2022-10-20 PROCEDURE — 85379 FIBRIN DEGRADATION QUANT: CPT | Performed by: STUDENT IN AN ORGANIZED HEALTH CARE EDUCATION/TRAINING PROGRAM

## 2022-10-20 RX ORDER — EZETIMIBE 10 MG/1
10 TABLET ORAL DAILY
COMMUNITY

## 2022-10-20 RX ADMIN — SODIUM CHLORIDE, POTASSIUM CHLORIDE, SODIUM LACTATE AND CALCIUM CHLORIDE 500 ML: 600; 310; 30; 20 INJECTION, SOLUTION INTRAVENOUS at 18:00

## 2022-10-20 NOTE — DISCHARGE INSTRUCTIONS
Please return if your chest pain severely worsens, if you become lightheaded or pass out, if you have severe shortness of breath, or for any other emergent concerns. Otherwise stop the torsemide for a few days and discuss with your primary care doctor. Follow-up with Dr. Ramey about your chest pain.

## 2022-10-20 NOTE — ED PROVIDER NOTES
Subjective   History of Present Illness  Patient presents due to chest pain and shortness of breath.  He ultimately notes that he came here because he was at endocrinology clinic and his blood pressure was 90s over 50s.  He states that sometimes he gets very fatigued and tired and he thinks this correlates with low blood pressure and he will seem to spontaneously recover.  He has had 6 weeks of constant chest pressure that has not changed or gone away entirely at any point.  He has had an abnormal stress test 2 weeks ago and was scheduled for a cath but says he got moved and he does not know when he will get it done.  He has also been short of breath since 2012 and says this only occasionally goes away and he feels that it is at baseline now.  He has a constant chest pressure but occasionally has sharp pain to the left side that lasts a couple seconds and then goes away.  No change in his orthopnea; this continues at baseline.    Review of Systems   Constitutional: Negative for chills and fever.   HENT: Negative for congestion and sore throat.    Eyes: Negative for pain and redness.   Respiratory: Positive for cough (baseline) and shortness of breath.    Cardiovascular: Positive for chest pain. Negative for palpitations.   Gastrointestinal: Negative for abdominal pain and vomiting.   Genitourinary: Negative for difficulty urinating and dysuria.   Musculoskeletal: Negative for gait problem and joint swelling.   Skin: Negative for rash and wound.   Neurological: Positive for light-headedness. Negative for syncope.       Past Medical History:   Diagnosis Date   • Arthritis    • Asthma    • Cancer ([facility])    • Carotid disease, bilateral ([facility])    • Chest pain    • Chronic diastolic congestive heart failure ([facility]) 9/7/2019   • Chronic sinusitis    • Maribell bullosa    • Coronary artery disease involving native coronary artery of native heart with unstable angina pectoris ([facility]) 1/17/2017   • Deviated septum    • Diabetes  mellitus (HCC)    • Difficulty urinating    • Diverticulitis    • Enlarged prostate    • Fatty liver    • GERD (gastroesophageal reflux disease)    • Hyperlipidemia LDL goal <70 2/2/2017   • Hypertrophy of nasal turbinates    • Keratoderma    • Kidney stone    • Migraine    • Murmur, heart    • Myocardial infarction (HCC)    • Obesity    • Paroxysmal atrial fibrillation (HCC) 7/11/2019   • PONV (postoperative nausea and vomiting)    • Primary hypertension 10/16/2016   • Psoriasis    • Seizures (HCC)    • Sinus congestion    • Skin cancer    • Sleep apnea    • SOB (shortness of breath)    • Stroke (HCC)    • UTI (urinary tract infection)        Allergies   Allergen Reactions   • Flagyl [Metronidazole] Hives   • Atorvastatin Other (See Comments)     Muscle cramps   • Ciprofloxacin Hives       Past Surgical History:   Procedure Laterality Date   • CARDIAC CATHETERIZATION  01/2016    Dr. Broadbent; widely patent previously placed stents in the left anterior descending and obstructive disease involving the diagonal branch which was treated medically   • CARDIAC CATHETERIZATION N/A 7/14/2017    Procedure: Left Heart Cath;  Surgeon: Wade Ramey MD;  Location:  [facility] CATH INVASIVE LOCATION;  Service:    • CARDIAC CATHETERIZATION Left 10/15/2018    Procedure: Cardiac Catheterization/Vascular Study;  Surgeon: Wade Ramey MD;  Location:  [facility]ATH [facility];  Service: Cardiology   • CARDIAC CATHETERIZATION  10/15/2018    Procedure: Functional Flow [place];  Surgeon: Wade Ramey MD;  Location:  [facility]ATH INV[facility];  Service: Cardiology   • CARDIAC CATHETERIZATION N/A 10/15/2018    Procedure: Left ventriculography;  Surgeon: Wade Ramey MD;  Location:  [facility]ATH [facility];  Service: Cardiology   • CARDIAC CATHETERIZATION Left 6/26/2019    Procedure: Cardiac Catheterization/Vascular Study VEL OK  HE WILL WAIT 1 YEAR FOR SHOULDER SURGERY ;  Surgeon: Wade Ramey MD;  Location:  [facility] CATH  INVASIVE LOCATION;  Service: Cardiology   • CARDIAC CATHETERIZATION Left 4/30/2021    Procedure: Coronary angiography;  Surgeon: Sahil Llamas MD;  Location: [facility] CATH INVASIVE LOCATION;  Service: Cardiology;  Laterality: Left;   • CARDIAC CATHETERIZATION N/A 4/30/2021    Procedure: Percutaneous Coronary Intervention;  Surgeon: Sahil Llamas MD;  Location:  [facility] CATH INVASIVE [facility];  Service: Cardiology;  Laterality: N/A;   • CHOLECYSTECTOMY     • CHOLECYSTECTOMY WITH INTRAOPERATIVE CHOLANGIOGRAM N/A 8/1/2018    Procedure: CHOLECYSTECTOMY LAPAROSCOPIC INTRAOPERATIVE CHOLANGIOGRAM;  Surgeon: Shane Ann MD;  Location: [facility] OR;  Service: General   • COLONOSCOPY N/A 7/14/2020    Procedure: COLONOSCOPY WITH ANESTHESIA;  Surgeon: Anupam Morales DO;  Location: [facility] ENDOSCOPY;  Service: Gastroenterology;  Laterality: N/A;  pre: abdominal pain  post: diverticulosis  Vinayak Correia PA   • CORONARY ANGIOPLASTY     • CORONARY ARTERY BYPASS GRAFT N/A 7/6/2019    Procedure: CABG X2 WITH LIMA, LEFT LEG OVH, AND PLACEMENT OF LEFT FEMORAL ARTERIAL LINE;  Surgeon: Steven Tang MD;  Location: [facility] OR;  Service: Cardiothoracic   • CORONARY STENT PLACEMENT      x 6   • ENDOSCOPIC FUNCTIONAL SINUS SURGERY (FESS) Bilateral 12/13/2017    Procedure: PROCEDURE PERFORMED:  Bilateral functional endoscopic anterior ethmoidectomy with bilateral middle meatal antrostomy Septoplasty Right kathia bullosa resection Bilateral inferior turbinate reduction via Coblation;  Surgeon: Mayank Ibarra MD;  Location: [facility] OR;  Service:    • ENDOSCOPY N/A 7/30/2018    Procedure: ESOPHAGOGASTRODUODENOSCOPY WITH ANESTHESIA;  Surgeon: Benitez Mas MD;  Location: [facility] ENDOSCOPY;  Service: Gastroenterology   • ENDOSCOPY N/A 7/14/2020    Procedure: ESOPHAGOGASTRODUODENOSCOPY WITH ANESTHESIA;  Surgeon: Anupam Morales DO;  Location: [facility] ENDOSCOPY;  Service: Gastroenterology;  Laterality: N/A;  pre:  abdominal pain  post: esophagitis  Vinayak Correia PA   • HERNIA REPAIR      x2 inguinal area   • KIDNEY STONE SURGERY     • KNEE ARTHROSCOPY Right 3/1/2022    Procedure: RIGHT KNEE PARTIAL LATERAL MENISCECTOMY;  Surgeon: Pedro Pablo Song MD;  Location: [facility] OR;  Service: Orthopedics;  Laterality: Right;   • KNEE SURGERY Right    • OTHER SURGICAL HISTORY      urolift   • PROSTATE SURGERY      Dr. Badillo -    • ROTATOR CUFF REPAIR     • THUMB AMPUTATION Left     partial   • TOE AMPUTATION Right     big       Family History   Problem Relation Age of Onset   • Heart disease Father    • COPD Mother    • Hypertension Mother    • Asthma Mother    • No Known Problems Sister    • Colon cancer Paternal Uncle    • Prostate cancer Maternal Grandfather    • No Known Problems Sister    • Colon cancer Maternal Grandmother    • Colon polyps Maternal Grandmother        Social History     Socioeconomic History   • Marital status:    Tobacco Use   • Smoking status: Former     Packs/day: 1.50     Years: 4.50     Pack years: 6.75     Types: Cigarettes, Cigars     Quit date:      Years since quittin.8   • Smokeless tobacco: Former     Types: Chew     Quit date:    Vaping Use   • Vaping Use: Never used   Substance and Sexual Activity   • Alcohol use: No   • Drug use: No   • Sexual activity: Defer           Objective   Physical Exam  Vitals reviewed.   Constitutional:       General: He is not in acute distress.  HENT:      Head: Normocephalic and atraumatic.   Eyes:      Extraocular Movements: Extraocular movements intact.      Conjunctiva/sclera: Conjunctivae normal.      Pupils: Pupils are equal, round, and reactive to light.   Cardiovascular:      Rate and Rhythm: Normal rate and regular rhythm.      Pulses: Normal pulses.      Heart sounds: Normal heart sounds.   Pulmonary:      Effort: Pulmonary effort is normal. No respiratory distress.   Abdominal:      General: Abdomen is flat. There is no  distension.      Tenderness: There is no abdominal tenderness.   Musculoskeletal:      Cervical back: Normal range of motion and neck supple.      Right lower leg: Edema present.      Left lower leg: Edema present.   Skin:     General: Skin is warm and dry.   Neurological:      General: No focal deficit present.      Mental Status: He is alert. Mental status is at baseline.   Psychiatric:         Behavior: Behavior normal.         Thought Content: Thought content normal.         Procedures           ED Course                                           GURU Boyer Jr. is a 62 y.o. male with PMH above who presents to the Emergency Department with chest pain. Diagnoses considered include PE given shob and sharp nature, ACS given pressure incl. at rest, MSK chest pain, pleurisy, GERD, pneumonia, pericarditis, aortic dissection. Patient hemodynamically stable; tamponade physiology unlikely. Given the differential, initial evaluation will include ECG, CXR, CBC, BMP, Tn x2.    Chart review shows that he had a cath in April of last year that showed widely patent bypass grafts, native vessel disease to the mid LAD with chronic total occlusion and ostium of OM1, normal left ventricular ejection fraction, no culprit stenosis to explain his anginal chest pain at the time.     ED Course:   - EKG reviewed by me; shows sinus rhythm, no focal ischemic changes, no arrhythmia.  - chest x-ray reviewed by me and shows no focal consolidations, no mediastinal widening, no cardiomegaly, no other acute cardiopulmonary process.  - Lab studies reviewed by me and are significant for mild elevation his creatinine.  He tells me he has recently started taking torsemide and given that his BNP is normal he likely is experiencing a degree of hypovolemia taking a diuretic and this is probably elevating his creatinine.  It is not clearly an JOSE when I review his previous creatinines and I given 500 cc of fluid and discussed that he will  need to follow-up with his primary care and he says he can call tomorrow about this.  -I discussed the patient's case with Dr. Cisse; given that he had a normal cath in April of last year and a recent stress test in August that was read as low risk, he does not feel the patient needs further work-up in the emergency department or admission.  He recommends outpatient follow-up Dr. Ramey.  - On re-evaluation, pt stable. Repeat Troponin pending. At this time, ACS is unlikely given the above ECG interpretation and negative troponin. Aortic dissection unlikely given lack of widened mediastinum on CXR, pain does not radiate to the back, is not described as tearing, and peripheral pulses noted to be equal in bilateral upper extremities. Pericarditis unlikely as the pain is not positional, no diffuse ST changes on ECG. No sign of pneumonia on CXR. Tamponade physiology unlikely given BP stable, no JVD on exam, no electrical alternans on ECG.     Plan at time of handoff is to get a repeat troponin and discharge.  I have discussed return precautions already and the patient understands the plan and agrees to it.  He will stop taking torsemide and discussed with the primary care doctor immediately tomorrow.      Electronically signed by:  Dewayne Pandey MD 10/20/2022 18:28 CDT  Note: Dragon medical dictation software was used in the creation of this note.      Final diagnoses:   Chest pain, unspecified type       ED Disposition  ED Disposition     ED Disposition   Discharge    Condition   Stable    Comment   --             Wade Ramey MD  [address]  [place] [address]  [phone]          Vinayak Correia PA  [address]  [place] [address]  [phone]               Medication List      No changes were made to your prescriptions during this visit.          Dewayne Pandey MD  10/20/22 0169

## 2022-10-21 ENCOUNTER — TELEPHONE (OUTPATIENT)
Dept: CARDIOLOGY | Facility: CLINIC | Age: 62
End: 2022-10-21

## 2022-10-21 LAB
QT INTERVAL: 388 MS
QTC INTERVAL: 464 MS

## 2022-10-21 NOTE — TELEPHONE ENCOUNTER
Caller: Carlos A Boyer Jr.    Relationship to patient: Self    Best call back number: [phone]    Patient is needing: PT STATED SOMEONE WAS SUPPOSED TO CONTACT HIM TO SRUTHI HIS HEART CATH WITH  ON 10.26.22 BUT NO ONE HAS DONE SO YET.

## 2022-10-21 NOTE — PROGRESS NOTES
Patient signed out to me by outgoing emergency medicine physician with plan to follow-up repeat troponin and discharge if negative.    Repeat troponin is negative so patient was discharged as per prior attending's plan.

## 2022-10-24 ENCOUNTER — TELEPHONE (OUTPATIENT)
Dept: CARDIOLOGY | Facility: CLINIC | Age: 62
End: 2022-10-24

## 2022-10-24 NOTE — TELEPHONE ENCOUNTER
States that on a couple of occ has experienced low BP 's and then BP will be back into 130-140's systolic. He states when it is like this he just wants to sleep. He went ED and they took him off of his torsemide and he says that it has helped a little. He states he is drinking over 2L of liquid daily.     He has a heart cath scheduled for 11/9      Please advise.

## 2022-10-31 ENCOUNTER — OFFICE VISIT (OUTPATIENT)
Dept: CARDIOLOGY | Facility: CLINIC | Age: 62
End: 2022-10-31

## 2022-10-31 VITALS
WEIGHT: 256 LBS | HEART RATE: 87 BPM | HEIGHT: 72 IN | RESPIRATION RATE: 18 BRPM | BODY MASS INDEX: 34.67 KG/M2 | SYSTOLIC BLOOD PRESSURE: 115 MMHG | OXYGEN SATURATION: 98 % | DIASTOLIC BLOOD PRESSURE: 70 MMHG

## 2022-10-31 DIAGNOSIS — Z99.89 OSA ON CPAP: ICD-10-CM

## 2022-10-31 DIAGNOSIS — I71.20 THORACIC AORTIC ANEURYSM WITHOUT RUPTURE, UNSPECIFIED PART: ICD-10-CM

## 2022-10-31 DIAGNOSIS — Z79.4 TYPE 2 DIABETES MELLITUS WITH OTHER CIRCULATORY COMPLICATION, WITH LONG-TERM CURRENT USE OF INSULIN: ICD-10-CM

## 2022-10-31 DIAGNOSIS — E66.09 CLASS 1 OBESITY DUE TO EXCESS CALORIES WITH SERIOUS COMORBIDITY AND BODY MASS INDEX (BMI) OF 34.0 TO 34.9 IN ADULT: ICD-10-CM

## 2022-10-31 DIAGNOSIS — Z79.01 CHRONIC ANTICOAGULATION: ICD-10-CM

## 2022-10-31 DIAGNOSIS — E11.59 TYPE 2 DIABETES MELLITUS WITH OTHER CIRCULATORY COMPLICATION, WITH LONG-TERM CURRENT USE OF INSULIN: ICD-10-CM

## 2022-10-31 DIAGNOSIS — I65.23 BILATERAL CAROTID ARTERY STENOSIS: ICD-10-CM

## 2022-10-31 DIAGNOSIS — G47.33 OSA ON CPAP: ICD-10-CM

## 2022-10-31 DIAGNOSIS — I10 PRIMARY HYPERTENSION: ICD-10-CM

## 2022-10-31 DIAGNOSIS — I50.32 CHRONIC DIASTOLIC CONGESTIVE HEART FAILURE: Primary | ICD-10-CM

## 2022-10-31 DIAGNOSIS — E78.5 HYPERLIPIDEMIA LDL GOAL <70: ICD-10-CM

## 2022-10-31 DIAGNOSIS — I48.0 PAROXYSMAL ATRIAL FIBRILLATION: ICD-10-CM

## 2022-10-31 DIAGNOSIS — I25.110 CORONARY ARTERY DISEASE INVOLVING NATIVE CORONARY ARTERY OF NATIVE HEART WITH UNSTABLE ANGINA PECTORIS: ICD-10-CM

## 2022-10-31 PROCEDURE — 99214 OFFICE O/P EST MOD 30 MIN: CPT | Performed by: NURSE PRACTITIONER

## 2022-10-31 RX ORDER — AMLODIPINE BESYLATE 5 MG/1
5 TABLET ORAL DAILY
Qty: 30 TABLET | Refills: 11 | Status: ON HOLD | OUTPATIENT
Start: 2022-10-31 | End: 2023-01-24

## 2022-10-31 NOTE — PROGRESS NOTES
Subjective:     Encounter Date:10/31/2022      Patient ID: Carlos A Boyer Jr. is a 62 y.o. male with chronic diastolic congestive heart failure, coronary artery disease status post coronary artery stenting and CABG x2 (LIMA to LAD and SVG to OM) 7/6/2019, paroxysmal atrial fibrillation status post cardioversion on chronic anticoagulation, hypertension, hyperlipidemia, type 2 diabetes mellitus, peripheral vascular disease, cerebrovascular accident, obstructive sleep apnea and obesity    Chief Complaint: 4 week follow up  Coronary Artery Disease  Presents for follow-up visit. Symptoms include chest pain and dizziness. Pertinent negatives include no chest pressure, chest tightness, leg swelling, palpitations, shortness of breath or weight gain. His past medical history is significant for CHF. Symptom course: unchanged. Compliance with medications is good.   Congestive Heart Failure  Presents for follow-up visit. Associated symptoms include chest pain. Pertinent negatives include no chest pressure, edema, fatigue, near-syncope, orthopnea, palpitations, paroxysmal nocturnal dyspnea, shortness of breath or unexpected weight change. The symptoms have been stable. The pain is at a severity of 5/10. The pain is moderate. The pain is present in the lateral region. The quality of the pain is described as sharp. Chest pain occurs at rest and with exertion. His past medical history is significant for CAD. Compliance with total regimen is 26-50%. Compliance with diet is 26-50%. Compliance with exercise is 26-50%. Compliance with medications is 51-75%.   Hypertension  This is a chronic problem. The current episode started more than 1 year ago. The problem has been waxing and waning since onset. Associated symptoms include chest pain. Pertinent negatives include no malaise/fatigue, orthopnea, palpitations, peripheral edema, PND or shortness of breath. Risk factors for coronary artery disease include dyslipidemia, diabetes  mellitus, male gender and obesity. Current antihypertension treatment includes beta blockers, angiotensin blockers, diuretics and direct vasodilators. Hypertensive end-organ damage includes CAD/MI and heart failure. Identifiable causes of hypertension include sleep apnea.     Patient presents today for management of congestive heart failure and coronary artery disease. Patient had Ranexa increased at office visit on 8/15/2022. He underwent a Lexiscan on 9/21/2022 that showed moderate apical and apical lateral ischemia. He wore a holter monitor for 14 days that showed predominantly NSR with rare supraventricular ectopic beats with APC burden of <1%. There were rare PVCs noted at a burden of <1% and a single 7 beat run of SVT, and no significant pauses. At office visit on 9/26/2022, Ranexa was increased to 1000 mg BID at that time. Patient is scheduled for a [facility] on 11/09/2022. Patient was in the ER on 10/20/2022 with chest pain and shortness of breath. EKG no acute changes. Troponin was negative x 2, d dimer and BNP were normal. Today he reports that he has been taking the Ranexa increased dose but hasnt noticed any improvement. He reports that he has been monitoring his blood pressure has been waxing and waning. He has had readings up to 200 systolic and other times he reports hypotension of 90 systolic. He denies bringing any blood pressure log with him today. He reports that he has occasional leg swelling and he takes a water pill; he reports needing to take it 2-3 times a month. He reports that his weight have remained stable. He reports that he was driving this am he experienced sharp pain that is located on the left side of his chest. He stopped and took a NTG. He reports that the pain relieved. He was a little dizzy and sat in the parking lot for about 30 minutes until he could drive again. He denies any recurrence of pain. Patient follows with Vinayak Correia as PCP.     The following portions of the patient's  history were reviewed and updated as appropriate: allergies, current medications, past family history, past medical history, past social history, past surgical history and problem list.    Allergies   Allergen Reactions   • Flagyl [Metronidazole] Hives   • Atorvastatin Other (See Comments)     Muscle cramps   • Ciprofloxacin Hives       Current Outpatient Medications:   •  albuterol (PROVENTIL HFA;VENTOLIN HFA) 108 (90 BASE) MCG/ACT inhaler, Inhale 2 puffs Every 6 (Six) Hours As Needed for wheezing., Disp: , Rfl:   •  apixaban (ELIQUIS) 5 MG tablet tablet, Take 1 tablet by mouth Every 12 (Twelve) Hours., Disp: 180 tablet, Rfl: 3  •  aspirin 81 MG chewable tablet, Chew 81 mg Daily., Disp: , Rfl:   •  calcium polycarbophil (FIBERCON) 625 MG tablet, Take 625 mg by mouth As Needed., Disp: , Rfl:   •  carboxymethylcellulose (REFRESH PLUS) 0.5 % solution, Administer 1 drop to both eyes 4 (Four) Times a Day As Needed for Dry Eyes., Disp: , Rfl:   •  empagliflozin (JARDIANCE) 10 MG tablet tablet, Take 10 mg by mouth Daily., Disp: , Rfl:   •  ezetimibe (ZETIA) 10 MG tablet, Take 1 tablet by mouth Daily., Disp: , Rfl:   •  fenofibrate (Tricor) 145 MG tablet, Take 1 tablet by mouth Daily., Disp: 30 tablet, Rfl: 2  •  fluticasone (FLONASE) 50 MCG/ACT nasal spray, 2 sprays into each nostril Daily., Disp: , Rfl:   •  gabapentin (Neurontin) 300 MG capsule, Take 1 capsule by mouth Every Night. Please overnight., Disp: 30 capsule, Rfl: 2  •  insulin aspart (novoLOG FLEXPEN) 100 UNIT/ML solution pen-injector sc pen, Inject 40 Units under the skin into the appropriate area as directed Every Morning. 40 units 3 times daily, Disp: , Rfl:   •  insulin detemir (LEVEMIR) 100 UNIT/ML injection, Inject 100 Units under the skin into the appropriate area as directed Daily. 100AM 110 units PM, Disp: , Rfl:   •  isosorbide mononitrate (IMDUR) 120 MG 24 hr tablet, Take 1 tablet by mouth Daily., Disp: 90 tablet, Rfl: 3  •  lamoTRIgine (LaMICtal)  200 MG tablet, Take 1 tablet by mouth 2 (Two) Times a Day., Disp: 180 tablet, Rfl: 1  •  levETIRAcetam (KEPPRA) 500 MG tablet, Take 3 tablets every morning, take 4 tablets every night., Disp: 630 tablet, Rfl: 1  •  Liraglutide (VICTOZA SC), Inject 1.2 mg under the skin into the appropriate area as directed Every Night., Disp: , Rfl:   •  metFORMIN (GLUCOPHAGE) 1000 MG tablet, Take 1 tablet by mouth 2 (Two) Times a Day With Meals. HOLD x 48 hours post contrast. May resume 3/18/18, Disp: , Rfl:   •  metoprolol tartrate (LOPRESSOR) 100 MG tablet, Take 100 mg by mouth 2 (Two) Times a Day., Disp: , Rfl:   •  Multiple Vitamin (MULTI VITAMIN PO), Take 1 tablet by mouth Daily., Disp: , Rfl:   •  nitroglycerin (NITROSTAT) 0.4 MG SL tablet, Place 0.4 mg under the tongue Every 5 (Five) Minutes As Needed for Chest Pain. Take no more than 3 doses in 15 minutes., Disp: , Rfl:   •  pantoprazole (PROTONIX) 40 MG EC tablet, Take 40 mg by mouth 2 (Two) Times a Day., Disp: , Rfl:   •  psyllium (METAMUCIL) 58.6 % packet, Take 1 packet by mouth Daily As Needed (constipation)., Disp: , Rfl:   •  ranolazine (Ranexa) 1000 MG 12 hr tablet, Take 1 tablet by mouth 2 (Two) Times a Day., Disp: 60 tablet, Rfl: 11  •  rosuvastatin (CRESTOR) 20 MG tablet, Take 20 mg by mouth Every Night., Disp: , Rfl:   •  sacubitril-valsartan (Entresto)  MG tablet, Take 1 tablet by mouth 2 (Two) Times a Day., Disp: 180 tablet, Rfl: 3  •  torsemide (Demadex) 20 MG tablet, Take 1 tablet by mouth Daily. (Patient taking differently: Take 1 tablet by mouth Daily As Needed.), Disp: 90 tablet, Rfl: 3  •  amLODIPine (Norvasc) 5 MG tablet, Take 1 tablet by mouth Daily., Disp: 30 tablet, Rfl: 11  Past Medical History:   Diagnosis Date   • Arthritis    • Asthma    • Cancer (HCC)    • Carotid disease, bilateral (HCC)    • Chest pain    • Chronic diastolic congestive heart failure (HCC) 9/7/2019   • Chronic sinusitis    • Maribell bullosa    • Coronary artery disease  involving native coronary artery of native heart with unstable angina pectoris (HCC) 2017   • Deviated septum    • Diabetes mellitus (HCC)    • Difficulty urinating    • Diverticulitis    • Enlarged prostate    • Fatty liver    • GERD (gastroesophageal reflux disease)    • Hyperlipidemia LDL goal <70 2017   • Hypertrophy of nasal turbinates    • Keratoderma    • Kidney stone    • Migraine    • Murmur, heart    • Myocardial infarction (HCC)    • Obesity    • Paroxysmal atrial fibrillation (HCC) 2019   • PONV (postoperative nausea and vomiting)    • Primary hypertension 10/16/2016   • Psoriasis    • Seizures (HCC)    • Sinus congestion    • Skin cancer    • Sleep apnea    • SOB (shortness of breath)    • Stroke (HCC)    • UTI (urinary tract infection)      Social History     Socioeconomic History   • Marital status:    Tobacco Use   • Smoking status: Former     Packs/day: 1.50     Years: 4.50     Pack years: 6.75     Types: Cigarettes, Cigars     Quit date:      Years since quittin.8   • Smokeless tobacco: Former     Types: Chew     Quit date:    Vaping Use   • Vaping Use: Never used   Substance and Sexual Activity   • Alcohol use: No   • Drug use: No   • Sexual activity: Defer       Review of Systems   Constitutional: Negative for fatigue, malaise/fatigue, unexpected weight change and weight gain.   HENT: Negative for nosebleeds.    Cardiovascular: Positive for chest pain and dyspnea on exertion. Negative for irregular heartbeat, leg swelling, near-syncope, orthopnea, palpitations and paroxysmal nocturnal dyspnea.   Respiratory: Negative for chest tightness and shortness of breath.    Hematologic/Lymphatic: Does not bruise/bleed easily.   Gastrointestinal: Negative for bloating.   Genitourinary: Negative for hematuria.   Neurological: Positive for dizziness. Negative for weakness.   All other systems reviewed and are negative.         Objective:     Vitals reviewed.   Constitutional:  "      General: Not in acute distress.     Appearance: Normal appearance. Well-developed. Obese.   Eyes:      Pupils: Pupils are equal, round, and reactive to light.   HENT:      Head: Normocephalic and atraumatic.      Nose: Nose normal.   Neck:      Vascular: No carotid bruit.   Pulmonary:      Effort: Pulmonary effort is normal. No respiratory distress.      Breath sounds: Normal breath sounds. No wheezing. No rales.   Cardiovascular:      Normal rate. Regular rhythm.      Murmurs: There is no murmur.   Edema:     Peripheral edema absent.   Abdominal:      General: There is no distension.      Palpations: Abdomen is soft.   Musculoskeletal: Normal range of motion.      Cervical back: Normal range of motion and neck supple. Skin:     General: Skin is warm.      Findings: No erythema or rash.   Neurological:      General: No focal deficit present.      Mental Status: Alert and oriented to person, place, and time.   Psychiatric:         Attention and Perception: Attention normal.         Mood and Affect: Mood normal.         Speech: Speech normal.         Behavior: Behavior normal.         Thought Content: Thought content normal.         Judgment: Judgment normal.         /70 (BP Location: Right arm, Patient Position: Sitting, Cuff Size: Adult)   Pulse 87   Resp 18   Ht 182.9 cm (72\")   Wt 116 kg (256 lb)   SpO2 98%   BMI 34.72 kg/m²     Procedures    Lab Review:       Results for orders placed during the hospital encounter of 04/28/21    Adult Transthoracic Echo Complete W/ Cont if Necessary Per Protocol    Interpretation Summary  · Left ventricular ejection fraction appears to be 56 - 60%. Left ventricular systolic function is normal. There appears to be mild hypokinesis of the apex and distal septal and anterior walls.  · Left ventricular wall thickness is consistent with mild to moderate concentric hypertrophy.  · Left ventricular diastolic function is consistent with (grade II w/high LAP) " pseudonormalization.  · Mild aortic valve stenosis is present.  · Normal size and function of the right ventricle.      Nuclear stress 9/21/2022:   Interpretation Summary  • Diaphragmatic attenuation artifact is present.  • Left ventricular ejection fraction is normal. (Calculated EF = 68%).  • Moderate apical and apical lateral ischemia  • See bull's-eye diagram below     Holter monitor 8/15/2022:  Conclusion:   Baseline rhythm was sinus.  No significant ectopy   Single 7 beat run of supraventricular tachycardia at a maximum rate of 122 bpm at 5:04 PM      Lab Results   Component Value Date    GLUCOSE 208 (H) 10/20/2022    CALCIUM 9.5 10/20/2022     10/20/2022    K 4.3 10/20/2022    CO2 25.0 10/20/2022     10/20/2022    BUN 26 (H) 10/20/2022    CREATININE 1.41 (H) 10/20/2022    EGFRIFNONA 75 01/21/2022    BCR 18.4 10/20/2022    ANIONGAP 12.0 10/20/2022     Lab Results   Component Value Date    CHOL 139 04/29/2021    CHLPL 143 01/07/2016    TRIG 599 (H) 04/29/2021    HDL 23 (L) 04/29/2021    LDL 33 04/29/2021     I have personally reviewed echo, nuclear stress, holter monitor, labs and past office notes prior to patients visit  Assessment:          Diagnosis Plan   1. Chronic diastolic congestive heart failure (HCC)        2. Coronary artery disease involving native coronary artery of native heart with unstable angina pectoris (HCC)        3. Paroxysmal atrial fibrillation (HCC)        4. Chronic anticoagulation        5. Primary hypertension        6. Hyperlipidemia LDL goal <70        7. Bilateral carotid artery stenosis        8. Type 2 diabetes mellitus with other circulatory complication, with long-term current use of insulin (HCC)        9. HOA on CPAP        10. Thoracic aortic aneurysm without rupture, unspecified part        11. Class 1 obesity due to excess calories with serious comorbidity and body mass index (BMI) of 34.0 to 34.9 in adult               Plan:       1. Chronic diastolic  congestive heart failure: NYHA class II. Compensated and stable in office today. BNP 10/20/2022 was normal. Continue Entresto, Jardiance and metoprolol. Discussed to continue torsemide on an as needed basis. Reviewed signs and symptoms of CHF and what to report to office with patient. Patient instructed to restrict sodium and record daily weight. Patient is to report weight gain of greater than 2 lbs overnight or 5 lbs in 1 week. Patient verbalized understanding of instructions and plan of care.     2. CAD: s/p CABG x 2 LIMA to LAD and SVG to OM on 7/6/2019 per Dr Tang. Abnormal Lexiscan 9/21/2022. LHC is scheduled for 11/09/2022. Continue aspirin, metoprolol and rosuvastatin. Continue Imdur and Ranexa. Discussed with patient to take NTG as needed for severe chest pain. If pain is not relieved after 3 NTG then go to ER for further evaluation.     3. Paroxysmal atrial fibrillation: No recurrence on recent holter monitor. Continue metoprolol and Eliquis.     4. Chronic anticoagulation: patient is on eliquis and denies any bleeding issues.     5. Hypertension: Controlled in office today. Patient didn't bring BP log with him to office but reports BP waxing and waning over the past couple of weeks. Will send Norvasc; he is to take at night if BP is >130/90. Recommend to monitor routinely and notify office if consistently >140/90 and bring log with him to appointment    6. Hyperlipidemia: Lipid panel from 4/2021 showed elevated triglycerides at 599; LDL was at 33 (which is at goal of <70). Continue rosuvastatin and tricor    7. Bilateral carotid artery stenosis: Follows with vascular, Dr Echavarria    8. Type 2 DM: managed and followed by PCP. Continue jardiance    9. HOA: Patient reports compliance with CPAP    10. Thoracic aortic aneurysm: patient continues to follow up with CTS, Dr Tang for routine surveillance. Stable at 4.4 cm.     11. Obesity: BMI is >= 30 and <35. (Class 1 Obesity). The following options were  offered after discussion;: exercise counseling/recommendations and nutrition counseling/recommendations      Patient is to keep previously scheduled appointment with Agnieszka DICKINSON on 12/15/2022.

## 2022-11-02 ENCOUNTER — HOSPITAL ENCOUNTER (EMERGENCY)
Facility: HOSPITAL | Age: 62
Discharge: HOME | End: 2022-11-02
Admitting: EMERGENCY MEDICINE

## 2022-11-02 VITALS
HEIGHT: 72 IN | SYSTOLIC BLOOD PRESSURE: 116 MMHG | WEIGHT: 255 LBS | RESPIRATION RATE: 16 BRPM | BODY MASS INDEX: 34.54 KG/M2 | TEMPERATURE: 98.1 F | HEART RATE: 74 BPM | DIASTOLIC BLOOD PRESSURE: 83 MMHG | OXYGEN SATURATION: 94 %

## 2022-11-02 DIAGNOSIS — J40 BRONCHITIS: Primary | ICD-10-CM

## 2022-11-02 PROCEDURE — 93005 ELECTROCARDIOGRAM TRACING: CPT

## 2022-11-02 PROCEDURE — 99283 EMERGENCY DEPT VISIT LOW MDM: CPT

## 2022-11-02 PROCEDURE — 93010 ELECTROCARDIOGRAM REPORT: CPT | Performed by: HOSPITALIST

## 2022-11-02 RX ORDER — AMOXICILLIN AND CLAVULANATE POTASSIUM 875; 125 MG/1; MG/1
1 TABLET, FILM COATED ORAL EVERY 12 HOURS
Qty: 20 TABLET | Refills: 0 | Status: SHIPPED | OUTPATIENT
Start: 2022-11-02 | End: 2022-11-12

## 2022-11-02 RX ORDER — GUAIFENESIN 600 MG/1
600 TABLET, EXTENDED RELEASE ORAL 2 TIMES DAILY
Qty: 20 TABLET | Refills: 0 | Status: SHIPPED | OUTPATIENT
Start: 2022-11-02 | End: 2022-11-12

## 2022-11-02 NOTE — ED PROVIDER NOTES
Subjective   History of Present Illness  62 yom with PMH of DM, GERD, kidney stones, seizures, stroke, carotid artery disease, CHF, HTN, hyperlipidemia, a fib and CAD presents with c/o cough.  He reports he has had symptoms for a couple of days. He states his cough is productive with yellow sputum.  He denies fever or chills.  He states his chest is 'tight' when he coughs but denies CP.  He denies fever or SOB.  He states he has a heart cath scheduled for next week.  He is concerned he is developing a respiratory infection and cannot have this completed.  He states 'I don't need a bunch of tests.  I don't have heart pain today. I know what that feels like.'  He is upset as he was sent here by urgent care for treatment he does not feel he needs.    On 09/21/22, he had a lexiscan completed - Diaphragmatic attenuation artifact is present.   Left ventricular ejection fraction is normal. (Calculated EF = 68%).   Moderate apical and apical lateral ischemia  See bull's-eye diagram below          Review of Systems   Constitutional: Negative for activity change, appetite change, fatigue and fever.   HENT: Negative for congestion, ear pain, facial swelling and sore throat.    Eyes: Negative for discharge and visual disturbance.   Respiratory: Positive for cough. Negative for apnea, chest tightness, shortness of breath, wheezing and stridor.         Yellow sputum   Cardiovascular: Negative for chest pain and palpitations.   Gastrointestinal: Negative for abdominal distention, abdominal pain, diarrhea, nausea and vomiting.   Genitourinary: Negative for difficulty urinating and dysuria.   Musculoskeletal: Negative for arthralgias and myalgias.   Skin: Negative for rash and wound.   Neurological: Negative for dizziness and seizures.   Psychiatric/Behavioral: Negative for agitation and confusion.       Past Medical History:   Diagnosis Date   • Arthritis    • Asthma    • Cancer (HCC)    • Carotid disease, bilateral (HCC)    •  Chest pain    • Chronic diastolic congestive heart failure ([facility]) 9/7/2019   • Chronic sinusitis    • Maribell bullosa    • Coronary artery disease involving native coronary artery of native heart with unstable angina pectoris ([facility]) 1/17/2017   • Deviated septum    • Diabetes mellitus ([facility])    • Difficulty urinating    • Diverticulitis    • Enlarged prostate    • Fatty liver    • GERD (gastroesophageal reflux disease)    • Hyperlipidemia LDL goal <70 2/2/2017   • Hypertrophy of nasal turbinates    • Keratoderma    • Kidney stone    • Migraine    • Murmur, heart    • Myocardial infarction ([facility])    • Obesity    • Paroxysmal atrial fibrillation ([facility]) 7/11/2019   • PONV (postoperative nausea and vomiting)    • Primary hypertension 10/16/2016   • Psoriasis    • Seizures ([facility])    • Sinus congestion    • Skin cancer    • Sleep apnea    • SOB (shortness of breath)    • Stroke ([facility])    • UTI (urinary tract infection)        Allergies   Allergen Reactions   • Flagyl [Metronidazole] Hives   • Atorvastatin Other (See Comments)     Muscle cramps   • Ciprofloxacin Hives       Past Surgical History:   Procedure Laterality Date   • CARDIAC CATHETERIZATION  01/2016    Dr. Broadbent; widely patent previously placed stents in the left anterior descending and obstructive disease involving the diagonal branch which was treated medically   • CARDIAC CATHETERIZATION N/A 7/14/2017    Procedure: Left Heart Cath;  Surgeon: Wade Ramey MD;  Location:  [facility];  Service:    • CARDIAC CATHETERIZATION Left 10/15/2018    Procedure: Cardiac Catheterization/Vascular Study;  Surgeon: Wade Ramey MD;  Location:  [facility];  Service: Cardiology   • CARDIAC CATHETERIZATION  10/15/2018    Procedure: Functional Flow [place];  Surgeon: Wade Ramey MD;  Location:  [facility];  Service: Cardiology   • CARDIAC CATHETERIZATION N/A 10/15/2018    Procedure: Left ventriculography;  Surgeon: Karoline  MD Wade;  Location:  [facility];  Service: Cardiology   • CARDIAC CATHETERIZATION Left 6/26/2019    Procedure: Cardiac Catheterization/Vascular Study VEL OK  HE WILL WAIT 1 YEAR FOR SHOULDER SURGERY ;  Surgeon: Wade Ramey MD;  Location:  [facility];  Service: Cardiology   • CARDIAC CATHETERIZATION Left 4/30/2021    Procedure: Coronary angiography;  Surgeon: Sahil Llamas MD;  Location:  [facility];  Service: Cardiology;  Laterality: Left;   • CARDIAC CATHETERIZATION N/A 4/30/2021    Procedure: Percutaneous Coronary Intervention;  Surgeon: Sahil Llamas MD;  Location:  [facility];  Service: Cardiology;  Laterality: N/A;   • CARDIAC CATHETERIZATION N/A 11/9/2022    Procedure: Left Heart Cath with SVGs;  Surgeon: Wade Ramey MD;  Location:  [facility];  Service: Cardiology;  Laterality: N/A;   • CHOLECYSTECTOMY     • CHOLECYSTECTOMY WITH INTRAOPERATIVE CHOLANGIOGRAM N/A 8/1/2018    Procedure: CHOLECYSTECTOMY LAPAROSCOPIC INTRAOPERATIVE CHOLANGIOGRAM;  Surgeon: Shane Ann MD;  Location: [facility] OR;  Service: General   • COLONOSCOPY N/A 7/14/2020    Procedure: COLONOSCOPY WITH ANESTHESIA;  Surgeon: Anupam Morales DO;  Location: [facility] ENDOSCOPY;  Service: Gastroenterology;  Laterality: N/A;  pre: abdominal pain  post: diverticulosis  Vinayak Correia PA   • CORONARY ANGIOPLASTY     • CORONARY ARTERY BYPASS GRAFT N/A 7/6/2019    Procedure: CABG X2 WITH LIMA, LEFT LEG OVH, AND PLACEMENT OF LEFT FEMORAL ARTERIAL LINE;  Surgeon: Steven Tang MD;  Location: [facility] OR;  Service: Cardiothoracic   • CORONARY STENT PLACEMENT      x 6   • ENDOSCOPIC FUNCTIONAL SINUS SURGERY (FESS) Bilateral 12/13/2017    Procedure: PROCEDURE PERFORMED:  Bilateral functional endoscopic anterior ethmoidectomy with bilateral middle meatal antrostomy Septoplasty Right kathia bullosa resection Bilateral inferior turbinate  reduction via [place];  Surgeon: Mayank Ibarra MD;  Location:  [facility] OR;  Service:    • ENDOSCOPY N/A 2018    Procedure: ESOPHAGOGASTRODUODENOSCOPY WITH ANESTHESIA;  Surgeon: Benitez Mas MD;  Location:  [facility] ENDOSCOPY;  Service: Gastroenterology   • ENDOSCOPY N/A 2020    Procedure: ESOPHAGOGASTRODUODENOSCOPY WITH ANESTHESIA;  Surgeon: Anupam Morales DO;  Location:  [facility] ENDOSCOPY;  Service: Gastroenterology;  Laterality: N/A;  pre: abdominal pain  post: esophagitis  Vinayak Correia PA   • HERNIA REPAIR      x2 inguinal area   • KIDNEY STONE SURGERY     • KNEE ARTHROSCOPY Right 3/1/2022    Procedure: RIGHT KNEE PARTIAL LATERAL MENISCECTOMY;  Surgeon: Pedro Pablo Song MD;  Location: [facility] OR;  Service: Orthopedics;  Laterality: Right;   • KNEE SURGERY Right    • OTHER SURGICAL HISTORY      urolift   • PROSTATE SURGERY      Dr. Badillo -    • ROTATOR CUFF REPAIR     • THUMB AMPUTATION Left     partial   • TOE AMPUTATION Right     big       Family History   Problem Relation Age of Onset   • Heart disease Father    • COPD Mother    • Hypertension Mother    • Asthma Mother    • No Known Problems Sister    • Colon cancer Paternal Uncle    • Prostate cancer Maternal Grandfather    • No Known Problems Sister    • Colon cancer Maternal Grandmother    • Colon polyps Maternal Grandmother        Social History     Socioeconomic History   • Marital status:    Tobacco Use   • Smoking status: Former     Packs/day: 1.50     Years: 4.50     Pack years: 6.75     Types: Cigarettes, Cigars     Quit date:      Years since quittin.8   • Smokeless tobacco: Former     Types: Chew     Quit date:    Vaping Use   • Vaping Use: Never used   Substance and Sexual Activity   • Alcohol use: No   • Drug use: No   • Sexual activity: Defer           Objective   Physical Exam  Vitals and nursing note reviewed.   Constitutional:       General: He is not in acute distress.     Appearance: He  is well-developed. He is not ill-appearing, toxic-appearing or diaphoretic.   HENT:      Head: Normocephalic.   Eyes:      Pupils: Pupils are equal, round, and reactive to light.   Cardiovascular:      Rate and Rhythm: Normal rate and regular rhythm.      Heart sounds: No murmur heard.  Pulmonary:      Effort: Pulmonary effort is normal. No respiratory distress.      Breath sounds: Normal breath sounds. No wheezing, rhonchi or rales.   Abdominal:      General: Bowel sounds are normal.      Palpations: Abdomen is soft.   Musculoskeletal:         General: Normal range of motion.      Cervical back: Normal range of motion and neck supple.   Skin:     General: Skin is warm and dry.      Coloration: Skin is not pale.   Neurological:      Mental Status: He is alert and oriented to person, place, and time.         Procedures           ED Course  ED Course as of 11/12/22 0911   Wed Nov 02, 2022   0930 I discussed plan of care with the patient.  He declines a cardiac work up.  He states 'I know what heart pain is like.  I've had several heart attacks, bypass surgery and heart caths with stents.  I have a chest cold.'  He states he has a heart cath scheduled for next week and he is worried it will not be completed if he is ill. I also reviewed his case with Dr Kenny.  He is not hypoxic and has no increased work of breathing.  I will go ahead and tx his respiratory issues.  He voiced understanding of all instructions including strict return precautions.  [KS]      ED Course User Index  [KS] Ani Loo, TEENA                                           MDM  Number of Diagnoses or Management Options  Bronchitis: minor     Amount and/or Complexity of Data Reviewed  Discuss the patient with other providers: yes    Risk of Complications, Morbidity, and/or Mortality  Presenting problems: minimal  Diagnostic procedures: minimal  Management options: minimal    Patient Progress  Patient progress: stable      Final  diagnoses:   Bronchitis       ED Disposition  ED Disposition     ED Disposition   Discharge    Condition   Stable    Comment   --             Vinayak Correia, PA  [address]  [place] [address] [phone]    Call in 1 day  Routine ED follow up         Medication List      New Prescriptions    amoxicillin-clavulanate 875-125 MG per tablet  Commonly known as: AUGMENTIN  Take 1 tablet by mouth Every 12 (Twelve) Hours for 10 days.     guaiFENesin 600 MG 12 hr tablet  Commonly known as: MUCINEX  Take 1 tablet by mouth 2 (Two) Times a Day for 10 days.           Where to Get Your Medications      These medications were sent to [id number] - [place], [place] - [address] - [phone]  - [phone] FX  [address] [place] [place] [address]    Phone: [phone]   · amoxicillin-clavulanate 875-125 MG per tablet  · guaiFENesin 600 MG 12 hr tablet          Ani Loo, TEENA  11/12/22 0912

## 2022-11-02 NOTE — DISCHARGE INSTRUCTIONS
Increase your fluid intake. Continue home medication.  New medication as ordered.  Follow up with PCP - call tomorrow for appointment. Keep your appointment for your heart cath next week.  Call their office if there are any issues. Return to ED if condition does not improve or worsens

## 2022-11-03 ENCOUNTER — OFFICE VISIT (OUTPATIENT)
Dept: UROLOGY | Facility: CLINIC | Age: 62
End: 2022-11-03

## 2022-11-03 VITALS — WEIGHT: 256 LBS | TEMPERATURE: 98.8 F | RESPIRATION RATE: 18 BRPM | HEIGHT: 72 IN | BODY MASS INDEX: 34.67 KG/M2

## 2022-11-03 DIAGNOSIS — N40.1 BPH WITH OBSTRUCTION/LOWER URINARY TRACT SYMPTOMS: ICD-10-CM

## 2022-11-03 DIAGNOSIS — N13.8 BPH WITH OBSTRUCTION/LOWER URINARY TRACT SYMPTOMS: ICD-10-CM

## 2022-11-03 DIAGNOSIS — R35.1 NOCTURIA: Primary | ICD-10-CM

## 2022-11-03 LAB
QT INTERVAL: 402 MS
QTC INTERVAL: 446 MS

## 2022-11-03 PROCEDURE — 51798 US URINE CAPACITY MEASURE: CPT | Performed by: PHYSICIAN ASSISTANT

## 2022-11-03 PROCEDURE — 99203 OFFICE O/P NEW LOW 30 MIN: CPT | Performed by: PHYSICIAN ASSISTANT

## 2022-11-03 RX ORDER — TAMSULOSIN HYDROCHLORIDE 0.4 MG/1
2 CAPSULE ORAL NIGHTLY
Qty: 180 CAPSULE | Refills: 3 | Status: ON HOLD | OUTPATIENT
Start: 2022-11-03 | End: 2023-01-24

## 2022-11-09 ENCOUNTER — HOSPITAL ENCOUNTER (OUTPATIENT)
Facility: HOSPITAL | Age: 62
Discharge: HOME | End: 2022-11-09
Attending: INTERNAL MEDICINE | Admitting: INTERNAL MEDICINE

## 2022-11-09 VITALS
TEMPERATURE: 96.7 F | RESPIRATION RATE: 18 BRPM | BODY MASS INDEX: 34.02 KG/M2 | DIASTOLIC BLOOD PRESSURE: 82 MMHG | OXYGEN SATURATION: 97 % | SYSTOLIC BLOOD PRESSURE: 130 MMHG | WEIGHT: 251.2 LBS | HEIGHT: 72 IN | HEART RATE: 70 BPM

## 2022-11-09 DIAGNOSIS — I25.110 CORONARY ARTERY DISEASE INVOLVING NATIVE CORONARY ARTERY OF NATIVE HEART WITH UNSTABLE ANGINA PECTORIS: ICD-10-CM

## 2022-11-09 LAB
ALBUMIN SERPL-MCNC: 4.5 G/DL (ref 3.5–5.2)
ALBUMIN/GLOB SERPL: 1.5 G/DL
ALP SERPL-CCNC: 90 U/L (ref 39–117)
ALT SERPL W P-5'-P-CCNC: 20 U/L (ref 1–41)
ANION GAP SERPL CALCULATED.3IONS-SCNC: 9 MMOL/L (ref 5–15)
AST SERPL-CCNC: 17 U/L (ref 1–40)
BILIRUB SERPL-MCNC: 0.8 MG/DL (ref 0–1.2)
BUN SERPL-MCNC: 20 MG/DL (ref 8–23)
BUN/CREAT SERPL: 18.5 (ref 7–25)
CALCIUM SPEC-SCNC: 10 MG/DL (ref 8.6–10.5)
CHLORIDE SERPL-SCNC: 105 MMOL/L (ref 98–107)
CO2 SERPL-SCNC: 26 MMOL/L (ref 22–29)
CREAT SERPL-MCNC: 1.08 MG/DL (ref 0.76–1.27)
DEPRECATED RDW RBC AUTO: 42.3 FL (ref 37–54)
EGFRCR SERPLBLD CKD-EPI 2021: 76.6 ML/MIN/1.73
ERYTHROCYTE [DISTWIDTH] IN BLOOD BY AUTOMATED COUNT: 13 % (ref 12.3–15.4)
GLOBULIN UR ELPH-MCNC: 3.1 GM/DL
GLUCOSE SERPL-MCNC: 95 MG/DL (ref 65–99)
HCT VFR BLD AUTO: 42.7 % (ref 37.5–51)
HGB BLD-MCNC: 14.4 G/DL (ref 13–17.7)
MCH RBC QN AUTO: 30.2 PG (ref 26.6–33)
MCHC RBC AUTO-ENTMCNC: 33.7 G/DL (ref 31.5–35.7)
MCV RBC AUTO: 89.5 FL (ref 79–97)
PLATELET # BLD AUTO: 149 10*3/MM3 (ref 140–450)
PMV BLD AUTO: 10.5 FL (ref 6–12)
POTASSIUM SERPL-SCNC: 4.1 MMOL/L (ref 3.5–5.2)
PROT SERPL-MCNC: 7.6 G/DL (ref 6–8.5)
RBC # BLD AUTO: 4.77 10*6/MM3 (ref 4.14–5.8)
SODIUM SERPL-SCNC: 140 MMOL/L (ref 136–145)
WBC NRBC COR # BLD: 6.05 10*3/MM3 (ref 3.4–10.8)

## 2022-11-09 PROCEDURE — 85027 COMPLETE CBC AUTOMATED: CPT | Performed by: NURSE PRACTITIONER

## 2022-11-09 PROCEDURE — 25010000002 MIDAZOLAM PER 1 MG: Performed by: INTERNAL MEDICINE

## 2022-11-09 PROCEDURE — 25010000002 HEPARIN (PORCINE) 1000-0.9 UT/500ML-% SOLUTION: Performed by: INTERNAL MEDICINE

## 2022-11-09 PROCEDURE — 99152 MOD SED SAME PHYS/QHP 5/>YRS: CPT | Performed by: INTERNAL MEDICINE

## 2022-11-09 PROCEDURE — 93459 L HRT ART/GRFT ANGIO: CPT | Performed by: INTERNAL MEDICINE

## 2022-11-09 PROCEDURE — C1769 GUIDE WIRE: HCPCS | Performed by: INTERNAL MEDICINE

## 2022-11-09 PROCEDURE — C1894 INTRO/SHEATH, NON-LASER: HCPCS | Performed by: INTERNAL MEDICINE

## 2022-11-09 PROCEDURE — 25010000002 DIPHENHYDRAMINE PER 50 MG: Performed by: INTERNAL MEDICINE

## 2022-11-09 PROCEDURE — 25010000002 HEPARIN (PORCINE) 2000-0.9 UNIT/L-% SOLUTION: Performed by: INTERNAL MEDICINE

## 2022-11-09 PROCEDURE — 93458 L HRT ARTERY/VENTRICLE ANGIO: CPT | Performed by: INTERNAL MEDICINE

## 2022-11-09 PROCEDURE — 25010000002 FENTANYL CITRATE (PF) 50 MCG/ML SOLUTION: Performed by: INTERNAL MEDICINE

## 2022-11-09 PROCEDURE — 80053 COMPREHEN METABOLIC PANEL: CPT | Performed by: NURSE PRACTITIONER

## 2022-11-09 PROCEDURE — C1760 CLOSURE DEV, VASC: HCPCS | Performed by: INTERNAL MEDICINE

## 2022-11-09 PROCEDURE — 99153 MOD SED SAME PHYS/QHP EA: CPT | Performed by: INTERNAL MEDICINE

## 2022-11-09 RX ORDER — ACETAMINOPHEN 325 MG/1
650 TABLET ORAL EVERY 4 HOURS PRN
Status: CANCELLED | OUTPATIENT
Start: 2022-11-09

## 2022-11-09 RX ORDER — MIDAZOLAM HYDROCHLORIDE 1 MG/ML
INJECTION INTRAMUSCULAR; INTRAVENOUS
Status: DISCONTINUED | OUTPATIENT
Start: 2022-11-09 | End: 2022-11-09 | Stop reason: HOSPADM

## 2022-11-09 RX ORDER — LIDOCAINE HYDROCHLORIDE 20 MG/ML
INJECTION, SOLUTION INFILTRATION; PERINEURAL
Status: DISCONTINUED | OUTPATIENT
Start: 2022-11-09 | End: 2022-11-09 | Stop reason: HOSPADM

## 2022-11-09 RX ORDER — ONDANSETRON 2 MG/ML
4 INJECTION INTRAMUSCULAR; INTRAVENOUS EVERY 6 HOURS PRN
Status: DISCONTINUED | OUTPATIENT
Start: 2022-11-09 | End: 2022-11-09 | Stop reason: HOSPADM

## 2022-11-09 RX ORDER — NITROGLYCERIN 0.4 MG/1
0.4 TABLET SUBLINGUAL
Status: DISCONTINUED | OUTPATIENT
Start: 2022-11-09 | End: 2022-11-09 | Stop reason: HOSPADM

## 2022-11-09 RX ORDER — SODIUM CHLORIDE 0.9 % (FLUSH) 0.9 %
10 SYRINGE (ML) INJECTION EVERY 12 HOURS SCHEDULED
Status: CANCELLED | OUTPATIENT
Start: 2022-11-09

## 2022-11-09 RX ORDER — SODIUM CHLORIDE 9 MG/ML
100 INJECTION, SOLUTION INTRAVENOUS CONTINUOUS
Status: CANCELLED | OUTPATIENT
Start: 2022-11-09

## 2022-11-09 RX ORDER — SODIUM CHLORIDE 0.9 % (FLUSH) 0.9 %
10 SYRINGE (ML) INJECTION EVERY 12 HOURS SCHEDULED
Status: DISCONTINUED | OUTPATIENT
Start: 2022-11-09 | End: 2022-11-09 | Stop reason: HOSPADM

## 2022-11-09 RX ORDER — SODIUM CHLORIDE 9 MG/ML
1-3 INJECTION, SOLUTION INTRAVENOUS CONTINUOUS
Status: DISCONTINUED | OUTPATIENT
Start: 2022-11-09 | End: 2022-11-09 | Stop reason: HOSPADM

## 2022-11-09 RX ORDER — DIPHENHYDRAMINE HYDROCHLORIDE 50 MG/ML
INJECTION INTRAMUSCULAR; INTRAVENOUS
Status: DISCONTINUED | OUTPATIENT
Start: 2022-11-09 | End: 2022-11-09 | Stop reason: HOSPADM

## 2022-11-09 RX ORDER — SODIUM CHLORIDE 0.9 % (FLUSH) 0.9 %
10 SYRINGE (ML) INJECTION AS NEEDED
Status: CANCELLED | OUTPATIENT
Start: 2022-11-09

## 2022-11-09 RX ORDER — HEPARIN SODIUM 200 [USP'U]/100ML
INJECTION, SOLUTION INTRAVENOUS
Status: DISCONTINUED | OUTPATIENT
Start: 2022-11-09 | End: 2022-11-09 | Stop reason: HOSPADM

## 2022-11-09 RX ORDER — SODIUM CHLORIDE 0.9 % (FLUSH) 0.9 %
10 SYRINGE (ML) INJECTION AS NEEDED
Status: DISCONTINUED | OUTPATIENT
Start: 2022-11-09 | End: 2022-11-09 | Stop reason: HOSPADM

## 2022-11-09 RX ORDER — FENTANYL CITRATE 50 UG/ML
INJECTION, SOLUTION INTRAMUSCULAR; INTRAVENOUS
Status: DISCONTINUED | OUTPATIENT
Start: 2022-11-09 | End: 2022-11-09 | Stop reason: HOSPADM

## 2022-11-09 RX ADMIN — SODIUM CHLORIDE 3 ML/KG/HR: 9 INJECTION, SOLUTION INTRAVENOUS at 13:32

## 2022-11-29 ENCOUNTER — LAB (OUTPATIENT)
Dept: LAB | Facility: HOSPITAL | Age: 62
End: 2022-11-29

## 2022-11-29 ENCOUNTER — APPOINTMENT (OUTPATIENT)
Dept: CT IMAGING | Facility: HOSPITAL | Age: 62
End: 2022-11-29

## 2022-11-29 ENCOUNTER — OFFICE VISIT (OUTPATIENT)
Dept: NEUROLOGY | Facility: CLINIC | Age: 62
End: 2022-11-29

## 2022-11-29 ENCOUNTER — HOSPITAL ENCOUNTER (EMERGENCY)
Facility: HOSPITAL | Age: 62
Discharge: HOME | End: 2022-11-29
Attending: EMERGENCY MEDICINE | Admitting: EMERGENCY MEDICINE

## 2022-11-29 VITALS
BODY MASS INDEX: 34.54 KG/M2 | WEIGHT: 255 LBS | SYSTOLIC BLOOD PRESSURE: 130 MMHG | HEIGHT: 72 IN | DIASTOLIC BLOOD PRESSURE: 85 MMHG | OXYGEN SATURATION: 95 % | HEART RATE: 85 BPM

## 2022-11-29 VITALS
BODY MASS INDEX: 34.67 KG/M2 | RESPIRATION RATE: 18 BRPM | TEMPERATURE: 98.7 F | OXYGEN SATURATION: 95 % | HEART RATE: 67 BPM | WEIGHT: 256 LBS | SYSTOLIC BLOOD PRESSURE: 138 MMHG | HEIGHT: 72 IN | DIASTOLIC BLOOD PRESSURE: 85 MMHG

## 2022-11-29 DIAGNOSIS — G40.909 SEIZURE DISORDER: Primary | ICD-10-CM

## 2022-11-29 DIAGNOSIS — G43.009 MIGRAINE WITHOUT AURA AND WITHOUT STATUS MIGRAINOSUS, NOT INTRACTABLE: ICD-10-CM

## 2022-11-29 DIAGNOSIS — R10.31 RLQ ABDOMINAL PAIN: Primary | ICD-10-CM

## 2022-11-29 DIAGNOSIS — G40.909 SEIZURE DISORDER: ICD-10-CM

## 2022-11-29 LAB
ALBUMIN SERPL-MCNC: 4.4 G/DL (ref 3.5–5.2)
ALBUMIN SERPL-MCNC: 4.4 G/DL (ref 3.5–5.2)
ALBUMIN/GLOB SERPL: 1.6 G/DL
ALBUMIN/GLOB SERPL: 1.8 G/DL
ALP SERPL-CCNC: 92 U/L (ref 39–117)
ALP SERPL-CCNC: 97 U/L (ref 39–117)
ALT SERPL W P-5'-P-CCNC: 23 U/L (ref 1–41)
ALT SERPL W P-5'-P-CCNC: 23 U/L (ref 1–41)
ANION GAP SERPL CALCULATED.3IONS-SCNC: 10 MMOL/L (ref 5–15)
ANION GAP SERPL CALCULATED.3IONS-SCNC: 11 MMOL/L (ref 5–15)
AST SERPL-CCNC: 16 U/L (ref 1–40)
AST SERPL-CCNC: 17 U/L (ref 1–40)
BASOPHILS # BLD AUTO: 0.02 10*3/MM3 (ref 0–0.2)
BASOPHILS # BLD AUTO: 0.04 10*3/MM3 (ref 0–0.2)
BASOPHILS NFR BLD AUTO: 0.4 % (ref 0–1.5)
BASOPHILS NFR BLD AUTO: 0.7 % (ref 0–1.5)
BILIRUB SERPL-MCNC: 0.7 MG/DL (ref 0–1.2)
BILIRUB SERPL-MCNC: 0.9 MG/DL (ref 0–1.2)
BILIRUB UR QL STRIP: NEGATIVE
BUN SERPL-MCNC: 18 MG/DL (ref 8–23)
BUN SERPL-MCNC: 19 MG/DL (ref 8–23)
BUN/CREAT SERPL: 12.9 (ref 7–25)
BUN/CREAT SERPL: 18.3 (ref 7–25)
CALCIUM SPEC-SCNC: 10 MG/DL (ref 8.6–10.5)
CALCIUM SPEC-SCNC: 9.7 MG/DL (ref 8.6–10.5)
CHLORIDE SERPL-SCNC: 106 MMOL/L (ref 98–107)
CHLORIDE SERPL-SCNC: 106 MMOL/L (ref 98–107)
CLARITY UR: CLEAR
CO2 SERPL-SCNC: 23 MMOL/L (ref 22–29)
CO2 SERPL-SCNC: 25 MMOL/L (ref 22–29)
COLOR UR: YELLOW
CREAT SERPL-MCNC: 1.04 MG/DL (ref 0.76–1.27)
CREAT SERPL-MCNC: 1.4 MG/DL (ref 0.76–1.27)
D-LACTATE SERPL-SCNC: 1.4 MMOL/L (ref 0.5–2)
DEPRECATED RDW RBC AUTO: 44.3 FL (ref 37–54)
DEPRECATED RDW RBC AUTO: 44.6 FL (ref 37–54)
EGFRCR SERPLBLD CKD-EPI 2021: 56.1 ML/MIN/1.73
EGFRCR SERPLBLD CKD-EPI 2021: 80.2 ML/MIN/1.73
EOSINOPHIL # BLD AUTO: 0.12 10*3/MM3 (ref 0–0.4)
EOSINOPHIL # BLD AUTO: 0.13 10*3/MM3 (ref 0–0.4)
EOSINOPHIL NFR BLD AUTO: 2.2 % (ref 0.3–6.2)
EOSINOPHIL NFR BLD AUTO: 2.6 % (ref 0.3–6.2)
ERYTHROCYTE [DISTWIDTH] IN BLOOD BY AUTOMATED COUNT: 13.1 % (ref 12.3–15.4)
ERYTHROCYTE [DISTWIDTH] IN BLOOD BY AUTOMATED COUNT: 13.4 % (ref 12.3–15.4)
FOLATE SERPL-MCNC: 8.83 NG/ML (ref 4.78–24.2)
GLOBULIN UR ELPH-MCNC: 2.4 GM/DL
GLOBULIN UR ELPH-MCNC: 2.8 GM/DL
GLUCOSE SERPL-MCNC: 131 MG/DL (ref 65–99)
GLUCOSE SERPL-MCNC: 209 MG/DL (ref 65–99)
GLUCOSE UR STRIP-MCNC: ABNORMAL MG/DL
HCT VFR BLD AUTO: 41.3 % (ref 37.5–51)
HCT VFR BLD AUTO: 43.2 % (ref 37.5–51)
HGB BLD-MCNC: 13.9 G/DL (ref 13–17.7)
HGB BLD-MCNC: 14.2 G/DL (ref 13–17.7)
HGB UR QL STRIP.AUTO: NEGATIVE
HOLD SPECIMEN: NORMAL
IMM GRANULOCYTES # BLD AUTO: 0.03 10*3/MM3 (ref 0–0.05)
IMM GRANULOCYTES # BLD AUTO: 0.03 10*3/MM3 (ref 0–0.05)
IMM GRANULOCYTES NFR BLD AUTO: 0.5 % (ref 0–0.5)
IMM GRANULOCYTES NFR BLD AUTO: 0.6 % (ref 0–0.5)
KETONES UR QL STRIP: NEGATIVE
LEUKOCYTE ESTERASE UR QL STRIP.AUTO: NEGATIVE
LIPASE SERPL-CCNC: 20 U/L (ref 13–60)
LYMPHOCYTES # BLD AUTO: 1.58 10*3/MM3 (ref 0.7–3.1)
LYMPHOCYTES # BLD AUTO: 1.9 10*3/MM3 (ref 0.7–3.1)
LYMPHOCYTES NFR BLD AUTO: 31.4 % (ref 19.6–45.3)
LYMPHOCYTES NFR BLD AUTO: 34.7 % (ref 19.6–45.3)
MAGNESIUM SERPL-MCNC: 2.2 MG/DL (ref 1.6–2.4)
MCH RBC QN AUTO: 30.4 PG (ref 26.6–33)
MCH RBC QN AUTO: 30.7 PG (ref 26.6–33)
MCHC RBC AUTO-ENTMCNC: 32.9 G/DL (ref 31.5–35.7)
MCHC RBC AUTO-ENTMCNC: 33.7 G/DL (ref 31.5–35.7)
MCV RBC AUTO: 91.2 FL (ref 79–97)
MCV RBC AUTO: 92.5 FL (ref 79–97)
MONOCYTES # BLD AUTO: 0.32 10*3/MM3 (ref 0.1–0.9)
MONOCYTES # BLD AUTO: 0.58 10*3/MM3 (ref 0.1–0.9)
MONOCYTES NFR BLD AUTO: 10.6 % (ref 5–12)
MONOCYTES NFR BLD AUTO: 6.4 % (ref 5–12)
NEUTROPHILS NFR BLD AUTO: 2.8 10*3/MM3 (ref 1.7–7)
NEUTROPHILS NFR BLD AUTO: 2.95 10*3/MM3 (ref 1.7–7)
NEUTROPHILS NFR BLD AUTO: 51.3 % (ref 42.7–76)
NEUTROPHILS NFR BLD AUTO: 58.6 % (ref 42.7–76)
NITRITE UR QL STRIP: NEGATIVE
NRBC BLD AUTO-RTO: 0 /100 WBC (ref 0–0.2)
NRBC BLD AUTO-RTO: 0 /100 WBC (ref 0–0.2)
PH UR STRIP.AUTO: 5.5 [PH] (ref 5–8)
PLATELET # BLD AUTO: 118 10*3/MM3 (ref 140–450)
PLATELET # BLD AUTO: 123 10*3/MM3 (ref 140–450)
PMV BLD AUTO: 11 FL (ref 6–12)
PMV BLD AUTO: 11.6 FL (ref 6–12)
POTASSIUM SERPL-SCNC: 4.1 MMOL/L (ref 3.5–5.2)
POTASSIUM SERPL-SCNC: 4.5 MMOL/L (ref 3.5–5.2)
PROT SERPL-MCNC: 6.8 G/DL (ref 6–8.5)
PROT SERPL-MCNC: 7.2 G/DL (ref 6–8.5)
PROT UR QL STRIP: NEGATIVE
RBC # BLD AUTO: 4.53 10*6/MM3 (ref 4.14–5.8)
RBC # BLD AUTO: 4.67 10*6/MM3 (ref 4.14–5.8)
SODIUM SERPL-SCNC: 140 MMOL/L (ref 136–145)
SODIUM SERPL-SCNC: 141 MMOL/L (ref 136–145)
SP GR UR STRIP: >1.03 (ref 1–1.03)
UROBILINOGEN UR QL STRIP: ABNORMAL
VIT B12 BLD-MCNC: 609 PG/ML (ref 211–946)
WBC NRBC COR # BLD: 5.03 10*3/MM3 (ref 3.4–10.8)
WBC NRBC COR # BLD: 5.47 10*3/MM3 (ref 3.4–10.8)
WHOLE BLOOD HOLD COAG: NORMAL
WHOLE BLOOD HOLD SPECIMEN: NORMAL

## 2022-11-29 PROCEDURE — 99214 OFFICE O/P EST MOD 30 MIN: CPT | Performed by: NURSE PRACTITIONER

## 2022-11-29 PROCEDURE — 36415 COLL VENOUS BLD VENIPUNCTURE: CPT

## 2022-11-29 PROCEDURE — 80053 COMPREHEN METABOLIC PANEL: CPT

## 2022-11-29 PROCEDURE — 74177 CT ABD & PELVIS W/CONTRAST: CPT

## 2022-11-29 PROCEDURE — 80177 DRUG SCRN QUAN LEVETIRACETAM: CPT

## 2022-11-29 PROCEDURE — 82607 VITAMIN B-12: CPT

## 2022-11-29 PROCEDURE — 85025 COMPLETE CBC W/AUTO DIFF WBC: CPT

## 2022-11-29 PROCEDURE — 25010000002 MORPHINE PER 10 MG: Performed by: EMERGENCY MEDICINE

## 2022-11-29 PROCEDURE — 83605 ASSAY OF LACTIC ACID: CPT

## 2022-11-29 PROCEDURE — 80175 DRUG SCREEN QUAN LAMOTRIGINE: CPT

## 2022-11-29 PROCEDURE — 25010000002 ONDANSETRON PER 1 MG: Performed by: EMERGENCY MEDICINE

## 2022-11-29 PROCEDURE — 83735 ASSAY OF MAGNESIUM: CPT

## 2022-11-29 PROCEDURE — 96374 THER/PROPH/DIAG INJ IV PUSH: CPT

## 2022-11-29 PROCEDURE — 99283 EMERGENCY DEPT VISIT LOW MDM: CPT

## 2022-11-29 PROCEDURE — 96375 TX/PRO/DX INJ NEW DRUG ADDON: CPT

## 2022-11-29 PROCEDURE — 81003 URINALYSIS AUTO W/O SCOPE: CPT

## 2022-11-29 PROCEDURE — 83690 ASSAY OF LIPASE: CPT

## 2022-11-29 PROCEDURE — 25010000002 IOPAMIDOL 61 % SOLUTION: Performed by: EMERGENCY MEDICINE

## 2022-11-29 PROCEDURE — 82746 ASSAY OF FOLIC ACID SERUM: CPT

## 2022-11-29 RX ORDER — SODIUM CHLORIDE 0.9 % (FLUSH) 0.9 %
10 SYRINGE (ML) INJECTION AS NEEDED
Status: DISCONTINUED | OUTPATIENT
Start: 2022-11-29 | End: 2022-11-29 | Stop reason: HOSPADM

## 2022-11-29 RX ORDER — ONDANSETRON 2 MG/ML
4 INJECTION INTRAMUSCULAR; INTRAVENOUS ONCE
Status: COMPLETED | OUTPATIENT
Start: 2022-11-29 | End: 2022-11-29

## 2022-11-29 RX ORDER — RIMEGEPANT SULFATE 75 MG/75MG
75 TABLET, ORALLY DISINTEGRATING ORAL AS NEEDED
Qty: 8 TABLET | Refills: 5 | Status: SHIPPED | OUTPATIENT
Start: 2022-11-29

## 2022-11-29 RX ADMIN — IOPAMIDOL 100 ML: 612 INJECTION, SOLUTION INTRAVENOUS at 16:10

## 2022-11-29 RX ADMIN — MORPHINE SULFATE 4 MG: 4 INJECTION, SOLUTION INTRAMUSCULAR; INTRAVENOUS at 15:02

## 2022-11-29 RX ADMIN — ONDANSETRON 4 MG: 2 INJECTION INTRAMUSCULAR; INTRAVENOUS at 14:58

## 2022-11-29 NOTE — PROGRESS NOTES
"  Neurology Progress Note      Chief Complaint:    Seizure  Neuropathy   Headache    Subjective   History of Present Illness:  Carlos A Boyer Jr. is a 62 y.o. male who presents today for seizure and headache.  He is routinely followed by Vinayak Correia PA for primary care.     Seizure  He remains on Lamictal 200mg BID and Keppra 1500mg in the AM and 2000mg in the PM.  He denies any missed medication doses and reports he is very strict with administration of his medications.  He reports that he had an episode back in March of what he believes to be a seizure as he reports rhythmic shaking.  He notes this to have lasted for around 2 minutes total and he never lost consciousness.  He denies any reports of incontinence or tongue biting with this.  He has not had any other episodes since then.  He has been previously stable without seizures for some time previously.     Neuropathy  He reports that he is doing well with regard to his neuropathy.  He continues to take Gabapentin 300mg nightly without any concerns.  His symptoms are controlled at this point without complaint.     Headache  He reports that he has been having worsened headaches.  He reports that these are occurring somewhere from 3-4 per month and can become relatively severe at times.  He notes that he has never really dealt with significant headaches in the past but over the past 6 months he has been having these severe headaches.  He notes frontal presentation with throbbing sensations. He states that there is photophobia and phonophobia present along with nausea and sometimes vomiting.  In the past he would be able to take an Excedrin and it will ease the pain.  However, this does not help the pain any longer. He does not routinely take the Excedrin.  Only when having the headache which, again, is only around 3-4 times per month.  He will have to go to a cold room and \"wait it out\" to ease the pain.     Allergies:    Flagyl [metronidazole], " Atorvastatin, and Ciprofloxacin    Medications:  No current facility-administered medications for this visit.     Current Outpatient Medications   Medication Sig Dispense Refill   • albuterol (PROVENTIL HFA;VENTOLIN HFA) 108 (90 BASE) MCG/ACT inhaler Inhale 2 puffs Every 6 (Six) Hours As Needed for wheezing.     • amLODIPine (Norvasc) 5 MG tablet Take 1 tablet by mouth Daily. 30 tablet 11   • apixaban (ELIQUIS) 5 MG tablet tablet Take 1 tablet by mouth Every 12 (Twelve) Hours. 180 tablet 3   • aspirin 81 MG chewable tablet Chew 81 mg Daily.     • calcium polycarbophil (FIBERCON) 625 MG tablet Take 625 mg by mouth As Needed.     • carboxymethylcellulose (REFRESH PLUS) 0.5 % solution Administer 1 drop to both eyes 4 (Four) Times a Day As Needed for Dry Eyes.     • empagliflozin (JARDIANCE) 10 MG tablet tablet Take 10 mg by mouth Daily.     • ezetimibe (ZETIA) 10 MG tablet Take 1 tablet by mouth Daily.     • fenofibrate (Tricor) 145 MG tablet Take 1 tablet by mouth Daily. 30 tablet 2   • fluticasone (FLONASE) 50 MCG/ACT nasal spray 2 sprays into each nostril Daily.     • gabapentin (Neurontin) 300 MG capsule Take 1 capsule by mouth Every Night. Please overnight. 30 capsule 2   • insulin aspart (novoLOG FLEXPEN) 100 UNIT/ML solution pen-injector sc pen Inject 40 Units under the skin into the appropriate area as directed Every Morning. 40 units 3 times daily     • insulin detemir (LEVEMIR) 100 UNIT/ML injection Inject 100 Units under the skin into the appropriate area as directed Daily. 100AM 110 units PM     • isosorbide mononitrate (IMDUR) 120 MG 24 hr tablet Take 1 tablet by mouth Daily. 90 tablet 3   • lamoTRIgine (LaMICtal) 200 MG tablet Take 1 tablet by mouth 2 (Two) Times a Day. 180 tablet 1   • levETIRAcetam (KEPPRA) 500 MG tablet Take 3 tablets every morning, take 4 tablets every night. 630 tablet 1   • Liraglutide (VICTOZA SC) Inject 1.2 mg under the skin into the appropriate area as directed Every Night.     •  metFORMIN (GLUCOPHAGE) 1000 MG tablet Take 1 tablet by mouth 2 (Two) Times a Day With Meals. HOLD x 48 hours post contrast. May resume 3/18/18     • metoprolol tartrate (LOPRESSOR) 100 MG tablet Take 100 mg by mouth 2 (Two) Times a Day.     • Multiple Vitamin (MULTI VITAMIN PO) Take 1 tablet by mouth Daily.     • nitroglycerin (NITROSTAT) 0.4 MG SL tablet Place 0.4 mg under the tongue Every 5 (Five) Minutes As Needed for Chest Pain. Take no more than 3 doses in 15 minutes.     • pantoprazole (PROTONIX) 40 MG EC tablet Take 40 mg by mouth 2 (Two) Times a Day.     • psyllium (METAMUCIL) 58.6 % packet Take 1 packet by mouth Daily As Needed (constipation).     • ranolazine (Ranexa) 1000 MG 12 hr tablet Take 1 tablet by mouth 2 (Two) Times a Day. 60 tablet 11   • rosuvastatin (CRESTOR) 20 MG tablet Take 20 mg by mouth Every Night.     • sacubitril-valsartan (Entresto)  MG tablet Take 1 tablet by mouth 2 (Two) Times a Day. 180 tablet 3   • tamsulosin (FLOMAX) 0.4 MG capsule 24 hr capsule Take 2 capsules by mouth Every Night for 360 days. 180 capsule 3   • Rimegepant Sulfate (Nurtec) 75 MG tablet dispersible tablet Take 1 tablet by mouth As Needed (Migraine). 8 tablet 5     Facility-Administered Medications Ordered in Other Visits   Medication Dose Route Frequency Provider Last Rate Last Admin   • sodium chloride 0.9 % flush 10 mL  10 mL Intravenous PRN Emergency, Triage Protocol, MD       • sodium chloride 0.9 % flush 10 mL  10 mL Intravenous PRN Mari Llamas MD         Current outpatient and discharge medications have been reconciled for the patient.  Reviewed by: TEENA Aaron    Past Medical History:  Past Medical History:   Diagnosis Date   • Arthritis    • Asthma    • Cancer (HCC)    • Carotid disease, bilateral (HCC)    • Chest pain    • Chronic diastolic congestive heart failure (HCC) 9/7/2019   • Chronic sinusitis    • Maribell bullosa    • Coronary artery disease involving native coronary artery  of native heart with unstable angina pectoris (HCC) 1/17/2017   • Deviated septum    • Diabetes mellitus (HCC)    • Difficulty urinating    • Diverticulitis    • Enlarged prostate    • Fatty liver    • GERD (gastroesophageal reflux disease)    • Hyperlipidemia LDL goal <70 2/2/2017   • Hypertrophy of nasal turbinates    • Keratoderma    • Kidney stone    • Migraine    • Murmur, heart    • Myocardial infarction (HCC)    • Obesity    • Paroxysmal atrial fibrillation (HCC) 7/11/2019   • PONV (postoperative nausea and vomiting)    • Primary hypertension 10/16/2016   • Psoriasis    • Seizures (HCC)    • Sinus congestion    • Skin cancer    • Sleep apnea    • SOB (shortness of breath)    • Stroke (HCC)    • UTI (urinary tract infection)      Past Surgical History:   Procedure Laterality Date   • CARDIAC CATHETERIZATION  01/2016    Dr. Broadbent; widely patent previously placed stents in the left anterior descending and obstructive disease involving the diagonal branch which was treated medically   • CARDIAC CATHETERIZATION N/A 7/14/2017    Procedure: Left Heart Cath;  Surgeon: Wade Ramey MD;  Location:  [facility] CATH [facility]VASIVE [facility];  Service:    • CARDIAC CATHETERIZATION Left 10/15/2018    Procedure: Cardiac Catheterization/Vascular Study;  Surgeon: Wade Ramey MD;  Location:  [facility];  Service: Cardiology   • CARDIAC CATHETERIZATION  10/15/2018    Procedure: Functional Flow [place];  Surgeon: Wade Ramey MD;  Location:  [facility];  Service: Cardiology   • CARDIAC CATHETERIZATION N/A 10/15/2018    Procedure: Left ventriculography;  Surgeon: Wade Ramey MD;  Location:  [facility];  Service: Cardiology   • CARDIAC CATHETERIZATION Left 6/26/2019    Procedure: Cardiac Catheterization/Vascular Study VEL OK  HE WILL WAIT 1 YEAR FOR SHOULDER SURGERY ;  Surgeon: Wade Ramey MD;  Location:  [facility];  Service: Cardiology   • CARDIAC  CATHETERIZATION Left 4/30/2021    Procedure: Coronary angiography;  Surgeon: Sahil Lalmas MD;  Location: [facility] CATH INVASIVE LOCATION;  Service: Cardiology;  Laterality: Left;   • CARDIAC CATHETERIZATION N/A 4/30/2021    Procedure: Percutaneous Coronary Intervention;  Surgeon: Sahil Llamas MD;  Location: [facility] CATH INVASIVE LOCATION;  Service: Cardiology;  Laterality: N/A;   • CARDIAC CATHETERIZATION N/A 11/9/2022    Procedure: Left Heart Cath with SVGs;  Surgeon: Wade Ramey MD;  Location: [facility] CATH INVASIVE LOCATION;  Service: Cardiology;  Laterality: N/A;   • CHOLECYSTECTOMY     • CHOLECYSTECTOMY WITH INTRAOPERATIVE CHOLANGIOGRAM N/A 8/1/2018    Procedure: CHOLECYSTECTOMY LAPAROSCOPIC INTRAOPERATIVE CHOLANGIOGRAM;  Surgeon: Shane Ann MD;  Location: [facility] OR;  Service: General   • COLONOSCOPY N/A 7/14/2020    Procedure: COLONOSCOPY WITH ANESTHESIA;  Surgeon: Anupam Morales DO;  Location: [facility] ENDOSCOPY;  Service: Gastroenterology;  Laterality: N/A;  pre: abdominal pain  post: diverticulosis  Vinayak Correia PA   • CORONARY ANGIOPLASTY     • CORONARY ARTERY BYPASS GRAFT N/A 7/6/2019    Procedure: CABG X2 WITH LIMA, LEFT LEG OVH, AND PLACEMENT OF LEFT FEMORAL ARTERIAL LINE;  Surgeon: Steven Tang MD;  Location: [facility] OR;  Service: Cardiothoracic   • CORONARY STENT PLACEMENT      x 6   • ENDOSCOPIC FUNCTIONAL SINUS SURGERY (FESS) Bilateral 12/13/2017    Procedure: PROCEDURE PERFORMED:  Bilateral functional endoscopic anterior ethmoidectomy with bilateral middle meatal antrostomy Septoplasty Right kathia bullosa resection Bilateral inferior turbinate reduction via Coblation;  Surgeon: Mayank Ibarra MD;  Location: [facility] OR;  Service:    • ENDOSCOPY N/A 7/30/2018    Procedure: ESOPHAGOGASTRODUODENOSCOPY WITH ANESTHESIA;  Surgeon: Benitez Mas MD;  Location: [facility] ENDOSCOPY;  Service: Gastroenterology   • ENDOSCOPY N/A 7/14/2020    Procedure:  ESOPHAGOGASTRODUODENOSCOPY WITH ANESTHESIA;  Surgeon: Anupam Morales DO;  Location: [facility] ENDOSCOPY;  Service: Gastroenterology;  Laterality: N/A;  pre: abdominal pain  post: esophagitis  Vinayak Correia PA   • HERNIA REPAIR      x2 inguinal area   • KIDNEY STONE SURGERY     • KNEE ARTHROSCOPY Right 3/1/2022    Procedure: RIGHT KNEE PARTIAL LATERAL MENISCECTOMY;  Surgeon: Pedro Pablo Song MD;  Location: [facility] OR;  Service: Orthopedics;  Laterality: Right;   • KNEE SURGERY Right    • OTHER SURGICAL HISTORY      urolift   • PROSTATE SURGERY      Dr. Badillo -    • ROTATOR CUFF REPAIR     • THUMB AMPUTATION Left     partial   • TOE AMPUTATION Right     big     Family History   Problem Relation Age of Onset   • Heart disease Father    • COPD Mother    • Hypertension Mother    • Asthma Mother    • No Known Problems Sister    • Colon cancer Paternal Uncle    • Prostate cancer Maternal Grandfather    • No Known Problems Sister    • Colon cancer Maternal Grandmother    • Colon polyps Maternal Grandmother      Social History     Tobacco Use   • Smoking status: Former     Packs/day: 1.50     Years: 4.50     Pack years: 6.75     Types: Cigarettes, Cigars     Quit date:      Years since quittin.9   • Smokeless tobacco: Former     Types: Chew     Quit date:    Vaping Use   • Vaping Use: Never used   Substance Use Topics   • Alcohol use: No   • Drug use: No     Review of Systems   Constitutional: Negative for activity change and fatigue.   Eyes: Positive for photophobia (with headache).   Gastrointestinal: Positive for nausea (with headache).   Musculoskeletal: Negative for gait problem.   Neurological: Positive for seizures and headache. Negative for facial asymmetry.         Objective   Vital Signs:  Temp:  [97 °F (36.1 °C)-98.7 °F (37.1 °C)] 98.7 °F (37.1 °C)  Heart Rate:  [77-85] 77  Resp:  [18-20] 18  BP: (130-147)/(71-85) 147/76      22  1004   Weight: 116 kg (255 lb)     182.9 cm  "(72\")  Body mass index is 34.58 kg/m².    Physical Exam  Vitals reviewed.   Constitutional:       Appearance: Normal appearance.   HENT:      Head: Normocephalic.      Mouth/Throat:      Pharynx: Oropharynx is clear.   Eyes:      General: Lids are normal.      Extraocular Movements: Extraocular movements intact.      Pupils: Pupils are equal, round, and reactive to light.   Cardiovascular:      Rate and Rhythm: Normal rate and regular rhythm.      Pulses: Normal pulses.   Pulmonary:      Effort: Pulmonary effort is normal.   Musculoskeletal:         General: Normal range of motion.      Cervical back: Normal range of motion and neck supple.   Skin:     General: Skin is warm and dry.      Capillary Refill: Capillary refill takes less than 2 seconds.   Neurological:      Motor: Motor strength is normal.      Coordination: Coordination is intact.      Deep Tendon Reflexes: Reflexes are normal and symmetric.   Psychiatric:         Mood and Affect: Mood normal.         Speech: Speech normal.       Neurological Exam  Mental Status  Awake, alert and oriented to person, place and time. Recent and remote memory are intact. Speech is normal. Language is fluent with no aphasia. Attention and concentration are normal.    Cranial Nerves  CN II: Visual acuity is normal. Visual fields full to confrontation.  CN III, IV, VI: Extraocular movements intact bilaterally. Normal lids and orbits bilaterally. Pupils equal round and reactive to light bilaterally.  CN V: Facial sensation is normal.  CN VII: Full and symmetric facial movement.  CN IX, X: Palate elevates symmetrically. Normal gag reflex.  CN XI: Shoulder shrug strength is normal.  CN XII: Tongue midline without atrophy or fasciculations.    Motor   Strength is 5/5 throughout all four extremities.    Sensory  Sensation is intact to light touch, pinprick, vibration and proprioception in all four extremities.    Reflexes  Deep tendon reflexes are 2+ and symmetric in all four " extremities.    Coordination    Finger-to-nose, rapid alternating movements and heel-to-shin normal bilaterally without dysmetria.    Gait  Normal casual, toe, heel and tandem gait.    Results Review:    Lab Results   Component Value Date    GLUCOSE 95 11/09/2022    BUN 20 11/09/2022    CREATININE 1.08 11/09/2022    EGFRIFNONA 75 01/21/2022    BCR 18.5 11/09/2022    K 4.1 11/09/2022    CO2 26.0 11/09/2022    CALCIUM 10.0 11/09/2022    PROTENTOTREF 6.3 02/16/2015    ALBUMIN 4.50 11/09/2022    LABIL2 1.4 02/16/2015    AST 17 11/09/2022    ALT 20 11/09/2022     Lab Results   Component Value Date    WBC 5.47 11/29/2022    HGB 14.2 11/29/2022    HCT 43.2 11/29/2022    MCV 92.5 11/29/2022     (L) 11/29/2022     Lab Results   Component Value Date    CHOL 139 04/29/2021    CHLPL 143 01/07/2016    TRIG 599 (H) 04/29/2021    HDL 23 (L) 04/29/2021    LDL 33 04/29/2021     Lab Results   Component Value Date    TSH 1.700 10/25/2019     Lab Results   Component Value Date    HGBA1C 9.30 (H) 04/28/2021     Lab Results   Component Value Date    FOLATE 10.90 09/21/2018     Lab Results   Component Value Date    LNKRXMFO71 460 09/21/2018        Plan .  Assessment:  Carlos A Boyer  is a 62 y.o. male who presents with seizure and headaches.  He reports a breakthrough seizure around March 2022 but none since.  This is new as he previously denied seizure for some time.  He reports that this is consistent with his previous episodes of seizure.  He has not had recent evaluation with lab work or neuroimaging.  He has had MRI with some hippocampal asymmetry but repeat MRI did not reveal this.  He has also been complaining of headaches that seem to have migrainous features with photophobia, phonophobia, and nausea/vomiting.  I would like to obtain updated MRI images to ensure no intercranial involvement.  He has not had updated labs in some time.  In the meantime, I would like to treat his headaches with Nurtec as I do feel they have  a migrainous component to them.  He is ineligible for triptans due to his significant cardiac disease and his history of a stroke.  Samples provided today.       Plan:  • MRI Brain with and without  • CMP, CBC, Magnesium, TSH, Folate, B12  • Nurtec 75mg as needed  • Continue Lamictal 200mg BID  • Continue Keppra 1500mg in the am and 2000mg in the pm  • Continue Gabapentin 300mg nightly.  • Call with concerns    The patient and I have discussed the plan of care and he is in full agreement at this time.     Follow-Up:  Return in about 2 months (around 1/29/2023) for Seizure, Migraine.       Flako Freeman, TEENA  11/29/22  15:22 CST

## 2022-12-02 ENCOUNTER — TELEPHONE (OUTPATIENT)
Dept: CARDIOLOGY | Facility: CLINIC | Age: 62
End: 2022-12-02

## 2022-12-02 ENCOUNTER — TELEPHONE (OUTPATIENT)
Dept: NEUROLOGY | Facility: CLINIC | Age: 62
End: 2022-12-02

## 2022-12-02 LAB
LAMOTRIGINE SERPL-MCNC: 4.6 UG/ML (ref 2–20)
LEVETIRACETAM SERPL-MCNC: 16.9 UG/ML (ref 10–40)

## 2022-12-02 NOTE — TELEPHONE ENCOUNTER
----- Message from TEENA Aaron sent at 12/2/2022  3:10 PM CST -----  Labs are all stable.  Lamictal and Keppra levels look good. Please let him know

## 2022-12-02 NOTE — TELEPHONE ENCOUNTER
Caller: Carlos A Boyer Jr.    Relationship: Self    Best call back number: [phone]    What form or medical record are you requesting: CARDIAC CLEARANCE    Who is requesting this form or medical record from you: DR. AMOL CHAVEZ    How would you like to receive the form or medical records (pick-up, mail, fax): FAX  If fax, what is the fax number:  [phone]       Timeframe paperwork needed: ASAP    Additional notes: PT IS NEEDING CARDIAC CLEARANCE FOR ORTHOPEDIC SURGERY THAT HAS NOT YET BEEN SCHEDULED. REQUESTING PROVIDER IS DR. CHAVEZ WITH THE [facility]. THE ADDRESS FOR THIS OFFICE IS [address] [place] [address] AND THE PHONE NUMBER [phone] - REQUEST THE [facility].

## 2022-12-05 ENCOUNTER — TELEPHONE (OUTPATIENT)
Dept: CARDIOLOGY | Facility: CLINIC | Age: 62
End: 2022-12-05

## 2022-12-05 ENCOUNTER — OFFICE VISIT (OUTPATIENT)
Dept: UROLOGY | Facility: CLINIC | Age: 62
End: 2022-12-05

## 2022-12-05 VITALS — TEMPERATURE: 97.3 F | HEIGHT: 60 IN | WEIGHT: 258.8 LBS | BODY MASS INDEX: 50.81 KG/M2

## 2022-12-05 DIAGNOSIS — N13.8 BPH WITH OBSTRUCTION/LOWER URINARY TRACT SYMPTOMS: ICD-10-CM

## 2022-12-05 DIAGNOSIS — R35.1 NOCTURIA: Primary | ICD-10-CM

## 2022-12-05 DIAGNOSIS — N40.1 BPH WITH OBSTRUCTION/LOWER URINARY TRACT SYMPTOMS: ICD-10-CM

## 2022-12-05 LAB
BILIRUB BLD-MCNC: NEGATIVE MG/DL
CLARITY, POC: CLEAR
COLOR UR: YELLOW
GLUCOSE UR STRIP-MCNC: ABNORMAL MG/DL
KETONES UR QL: NEGATIVE
LEUKOCYTE EST, POC: NEGATIVE
NITRITE UR-MCNC: NEGATIVE MG/ML
PH UR: 5.5 [PH] (ref 5–8)
PROT UR STRIP-MCNC: NEGATIVE MG/DL
RBC # UR STRIP: NEGATIVE /UL
SP GR UR: 1.02 (ref 1–1.03)
UROBILINOGEN UR QL: ABNORMAL

## 2022-12-05 PROCEDURE — 51798 US URINE CAPACITY MEASURE: CPT | Performed by: PHYSICIAN ASSISTANT

## 2022-12-05 PROCEDURE — 99214 OFFICE O/P EST MOD 30 MIN: CPT | Performed by: PHYSICIAN ASSISTANT

## 2022-12-05 PROCEDURE — 81001 URINALYSIS AUTO W/SCOPE: CPT | Performed by: PHYSICIAN ASSISTANT

## 2022-12-05 NOTE — TELEPHONE ENCOUNTER
----- Message from Felisa Gandara MA sent at 12/5/2022  8:05 AM CST -----  Regarding: RE: Cleared for surgery  Waiting for letter to be signed   ----- Message -----  From: Wade Ramey MD  Sent: 12/2/2022   4:21 PM CST  To: Loida Palacios MA, Felisa Gandara MA  Subject: Cleared for surgery                              Acceptable cardiovascular risk of planned procedure.  Can proceed with surgery with usual caution and perioperative hemodynamic and cardiac rhythm monitoring.     Okay to hold Eliquis for 48 hours prior to procedure

## 2022-12-13 ENCOUNTER — APPOINTMENT (OUTPATIENT)
Dept: MRI IMAGING | Facility: HOSPITAL | Age: 62
End: 2022-12-13

## 2022-12-14 NOTE — PROGRESS NOTES
Chief Complaint  Hypertension (6wk F/U. Started Norvasc LOV) and Coronary Artery Disease (S/P Cath. No intervention)    Subjective          Carlos A Monroemacario Cabello presents to [id number] for routine follow-up of patient procedure.  Left heart catheterization on 11/9/2022 revealed no obstructive coronary artery disease. He has chronic diastolic congestive heart failure, coronary artery disease status post coronary artery stenting and CABG x2 (LIMA to LAD and SVG to OM) 7/6/2019, paroxysmal atrial fibrillation status post cardioversion on chronic anticoagulation, hypertension, hyperlipidemia, type 2 diabetes mellitus, peripheral vascular disease, cerebrovascular accident, obstructive sleep apnea and obesity.  He continues to complain of dyspnea with mild exertion and exertional chest pain.  He reports intermittent bilateral extremity edema. Patient denies dizziness, syncope, orthopnea, PND or decreased stamina.  Patient denies any signs of bleeding.    Coronary Artery Disease  Presents for follow-up visit. Symptoms include chest pain, leg swelling, palpitations and shortness of breath. Pertinent negatives include no chest pressure, chest tightness, dizziness, muscle weakness or weight gain. Risk factors include hyperlipidemia and obesity. Risk factors do not include hypertension. His past medical history is significant for CHF. The symptoms have been stable. Compliance with diet is variable. Compliance with exercise is variable. Compliance with medications is good.   Atrial Fibrillation  Presents for follow-up visit. Symptoms include chest pain, palpitations and shortness of breath. Symptoms are negative for an AICD problem, bradycardia, dizziness, hemodynamic instability, hypertension, hypotension, pacemaker problem, syncope, tachycardia and weakness. The symptoms have been stable. Past medical history includes atrial fibrillation, CAD, CHF and hyperlipidemia. There are no medication  "compliance problems.   Hypertension  This is a chronic problem. The current episode started more than 1 year ago. The problem is controlled. Associated symptoms include chest pain, palpitations and shortness of breath. Pertinent negatives include no anxiety, blurred vision, headaches, malaise/fatigue, neck pain, orthopnea, peripheral edema, PND or sweats. Risk factors for coronary artery disease include male gender, obesity, diabetes mellitus and dyslipidemia. Current antihypertension treatment includes ACE inhibitors, diuretics and direct vasodilators. The current treatment provides significant improvement. Hypertensive end-organ damage includes CAD/MI, CVA and PVD. Identifiable causes of hypertension include sleep apnea.   Hyperlipidemia  This is a chronic problem. The current episode started more than 1 year ago. Exacerbating diseases include diabetes and obesity. Associated symptoms include chest pain and shortness of breath. Current antihyperlipidemic treatment includes statins. Risk factors for coronary artery disease include male sex, obesity, hypertension, dyslipidemia and diabetes mellitus.   Congestive Heart Failure  Presents for follow-up visit. Associated symptoms include chest pain, edema, palpitations and shortness of breath. Pertinent negatives include no abdominal pain, chest pressure, claudication, fatigue, muscle weakness, near-syncope, nocturia, orthopnea, paroxysmal nocturnal dyspnea or unexpected weight change. The symptoms have been stable. His past medical history is significant for CAD. Compliance with total regimen is 51-75%. Compliance with diet is 51-75%. Compliance with exercise is 51-75%. Compliance with medications is %.      I have reviewed and confirmed the accuracy of the ROS  TEENA Buckner        Objective   Vital Signs:   /78   Pulse 64   Ht 182.9 cm (72\")   Wt 118 kg (260 lb)   SpO2 98%   BMI 35.26 kg/m²     Vitals and nursing note reviewed. "   Constitutional:       General: Awake.      Appearance: Normal and healthy appearance. Well-developed and not in distress. Obese.   Eyes:      General: Lids are normal.      Conjunctiva/sclera: Conjunctivae normal.      Pupils: Pupils are equal, round, and reactive to light.   HENT:      Head: Normocephalic and atraumatic.      Nose: Nose normal.   Neck:      Vascular: No JVR. JVD normal.   Pulmonary:      Effort: Pulmonary effort is normal.      Breath sounds: Examination of the right-upper field reveals decreased breath sounds. Examination of the left-upper field reveals decreased breath sounds. Examination of the right-middle field reveals decreased breath sounds. Examination of the left-middle field reveals decreased breath sounds. Examination of the right-lower field reveals decreased breath sounds. Examination of the left-lower field reveals decreased breath sounds. Decreased breath sounds present. No wheezing. No rhonchi. No rales.   Chest:      Chest wall: Not tender to palpatation.   Cardiovascular:      PMI at left midclavicular line. Normal rate. Regular rhythm. Normal S1. Normal S2.      Murmurs: There is a grade 2/6 harsh midsystolic murmur at the URSB, radiating to the neck. There is a grade 2/4 high frequency blowing decrescendo, early diastolic murmur at the URSB, radiating to the apex.      No gallop. No click. No rub.   Pulses:     Intact distal pulses.   Edema:     Peripheral edema absent.   Abdominal:      General: Bowel sounds are normal.      Palpations: Abdomen is soft.      Tenderness: There is no abdominal tenderness.   Musculoskeletal: Normal range of motion.         General: No tenderness.      Cervical back: Normal range of motion. Skin:     General: Skin is warm and dry.   Neurological:      General: No focal deficit present.      Mental Status: Alert, oriented to person, place, and time and oriented to person, place and time.   Psychiatric:         Attention and Perception: Attention  and perception normal.         Mood and Affect: Mood and affect normal.         Speech: Speech normal.         Behavior: Behavior normal. Behavior is cooperative.         Thought Content: Thought content normal.         Cognition and Memory: Cognition and memory normal.         Judgment: Judgment normal.        Result Review :   The following data was reviewed by: TEENA Buckner on 12/14/2022:  Common labs    Common Labs 10/20/22 10/20/22 11/9/22 11/9/22 11/29/22 11/29/22 11/29/22 11/29/22    1546 1546 1306 1306 1115 1115 1452 1452   Glucose  208 (A)  95  209 (A)  131 (A)   BUN  26 (A)  20  18  19   Creatinine  1.41 (A)  1.08  1.40 (A)  1.04   Sodium  140  140  141  140   Potassium  4.3  4.1  4.5  4.1   Chloride  103  105  106  106   Calcium  9.5  10.0  10.0  9.7   Albumin    4.50  4.40  4.40   Total Bilirubin    0.8  0.7  0.9   Alkaline Phosphatase    90  97  92   AST (SGOT)    17  16  17   ALT (SGPT)    20  23  23   WBC 5.39  6.05  5.03  5.47    Hemoglobin 14.9  14.4  13.9  14.2    Hematocrit 44.3  42.7  41.3  43.2    Platelets 143  149  118 (A)  123 (A)    (A) Abnormal value       Comments are available for some flowsheets but are not being displayed.           Data reviewed: Cardiology studies 2d echo 4/29/21, Holter monitor 9/11/2022, and Sahra scan 9/21/2022         Assessment and Plan    Diagnoses and all orders for this visit:    1. Chronic diastolic congestive heart failure (HCC) (Primary)-NYHA class II.  Stage C. Compensated. Start spironolactone 25 mg daily. BMP in one week. Reviewed signs and symptoms of CHF and what to report with the patient. Patient instructed to restrict sodium and weigh daily. Report weight gain of greater than 2 lbs overnight or 5 lbs in 1 week. Pt verbalized understanding of instructions and plan of care.  Continue Entresto, Jardiance and metoprolol tartrate.    2. Coronary artery disease involving native coronary artery of native heart with stable angina pectoris (HCC)-  Continue Imdur and Ranexa.     3. S/P CABG x 2-LIMA to LAD and SVG to OM 7/6/2019 per Dr. Steven Tang at [facility].  Patient continues on aspirin.  Denies bleeding.    4. Paroxysmal atrial fibrillation (HCC)- no recurrence on recent Holter monitor.  Anticoagulated.  Stable.  Continue metoprolol tartrate.     5. Chronic anticoagulation-continue Eliquis.  Patient denies bleeding.    6. Primary hypertension-blood pressure is well controlled. Continue to monitor following above medication adjustment. Continue metoprolol tartrate, amlodipine and Entresto.  Monitor and record daily blood pressure. Report readings consistently higher than 130/80 or consistently lower than 100/60.     7. Hyperlipidemia LDL goal <70-management per PCP.  Continue Crestor and Tricor.    8. Bilateral carotid artery stenosis-patient follows with Dr. Chetan Echavarria with vascular surgery.  Stable.    9. Type 2 diabetes mellitus with other circulatory complication, with long-term current use of insulin ([facility])-management per PCP.  Type 2 diabetes mellitus in the setting of obstructive coronary artery disease.  Continue Jardiance.    10. HOA on CPAP- pt has not been wearing his CPAP for approximately one year due to frequent sinus infections. Referral to ENT for evaluation for potential Inspire device.     11. Class 2 severe obesity due to excess calories with serious comorbidity and body mass index (BMI) of 35.0 to 35.9 in adult ([facility])- Patient's Body mass index is 35.26 kg/m². indicating that he is obese (BMI >30). Obesity-related health conditions include the following: obstructive sleep apnea, hypertension, coronary heart disease, diabetes mellitus, dyslipidemias and peripheral vascular disease. Obesity is unchanged. BMI is is above average; no BMI management plan is appropriate. We discussed low calorie, low carb based diet program, portion control and increasing exercise.    12. Thoracic aortic aneurysm without rupture  (HCC)-patient follows with Dr. Steven Tang with CT surgery for surveillance of 4.4 cm thoracic aortic aneurysm.  Stable.      Follow Up   Return in about 4 weeks (around 1/12/2023) for Next scheduled follow up.  Patient was given instructions and counseling regarding his condition or for health maintenance advice. Please see specific information pulled into the AVS if appropriate.

## 2022-12-15 ENCOUNTER — OFFICE VISIT (OUTPATIENT)
Dept: CARDIOLOGY | Facility: CLINIC | Age: 62
End: 2022-12-15

## 2022-12-15 VITALS
BODY MASS INDEX: 35.21 KG/M2 | OXYGEN SATURATION: 98 % | SYSTOLIC BLOOD PRESSURE: 126 MMHG | HEART RATE: 64 BPM | WEIGHT: 260 LBS | DIASTOLIC BLOOD PRESSURE: 78 MMHG | HEIGHT: 72 IN

## 2022-12-15 DIAGNOSIS — Z79.4 TYPE 2 DIABETES MELLITUS WITH OTHER CIRCULATORY COMPLICATION, WITH LONG-TERM CURRENT USE OF INSULIN: Chronic | ICD-10-CM

## 2022-12-15 DIAGNOSIS — I71.20 THORACIC AORTIC ANEURYSM WITHOUT RUPTURE, UNSPECIFIED PART: ICD-10-CM

## 2022-12-15 DIAGNOSIS — I25.118 CORONARY ARTERY DISEASE OF NATIVE ARTERY OF NATIVE HEART WITH STABLE ANGINA PECTORIS: ICD-10-CM

## 2022-12-15 DIAGNOSIS — E66.01 CLASS 2 SEVERE OBESITY DUE TO EXCESS CALORIES WITH SERIOUS COMORBIDITY AND BODY MASS INDEX (BMI) OF 35.0 TO 35.9 IN ADULT: ICD-10-CM

## 2022-12-15 DIAGNOSIS — E78.5 HYPERLIPIDEMIA LDL GOAL <70: Chronic | ICD-10-CM

## 2022-12-15 DIAGNOSIS — I10 PRIMARY HYPERTENSION: Chronic | ICD-10-CM

## 2022-12-15 DIAGNOSIS — G47.33 OSA ON CPAP: Chronic | ICD-10-CM

## 2022-12-15 DIAGNOSIS — I65.23 BILATERAL CAROTID ARTERY STENOSIS: Chronic | ICD-10-CM

## 2022-12-15 DIAGNOSIS — I48.0 PAROXYSMAL ATRIAL FIBRILLATION: Chronic | ICD-10-CM

## 2022-12-15 DIAGNOSIS — Z99.89 OSA ON CPAP: Chronic | ICD-10-CM

## 2022-12-15 DIAGNOSIS — Z79.01 CHRONIC ANTICOAGULATION: Chronic | ICD-10-CM

## 2022-12-15 DIAGNOSIS — E11.59 TYPE 2 DIABETES MELLITUS WITH OTHER CIRCULATORY COMPLICATION, WITH LONG-TERM CURRENT USE OF INSULIN: Chronic | ICD-10-CM

## 2022-12-15 DIAGNOSIS — I50.32 CHRONIC DIASTOLIC CONGESTIVE HEART FAILURE: Primary | Chronic | ICD-10-CM

## 2022-12-15 DIAGNOSIS — G47.33 OSA (OBSTRUCTIVE SLEEP APNEA): ICD-10-CM

## 2022-12-15 DIAGNOSIS — Z95.1 S/P CABG X 2: Chronic | ICD-10-CM

## 2022-12-15 PROBLEM — E66.812 CLASS 2 SEVERE OBESITY DUE TO EXCESS CALORIES WITH SERIOUS COMORBIDITY AND BODY MASS INDEX (BMI) OF 35.0 TO 35.9 IN ADULT: Status: ACTIVE | Noted: 2019-07-02

## 2022-12-15 PROCEDURE — 99214 OFFICE O/P EST MOD 30 MIN: CPT | Performed by: NURSE PRACTITIONER

## 2022-12-15 RX ORDER — SPIRONOLACTONE 25 MG/1
25 TABLET ORAL DAILY
Qty: 90 TABLET | Refills: 3 | Status: SHIPPED | OUTPATIENT
Start: 2022-12-15

## 2022-12-19 ENCOUNTER — TELEPHONE (OUTPATIENT)
Dept: OTOLARYNGOLOGY | Facility: CLINIC | Age: 62
End: 2022-12-19

## 2022-12-19 NOTE — TELEPHONE ENCOUNTER
HUB CAN READ. [facility] PLEASE TELL APPOINTMENT.    Called to notify the pt of their appt with Otolaryngology (Yung Shin, APRN) 02/01/2023 at 10:00 AM. There was no answer on the number provided so I left a message with the appt information.

## 2022-12-28 ENCOUNTER — PROCEDURE VISIT (OUTPATIENT)
Dept: UROLOGY | Facility: CLINIC | Age: 62
End: 2022-12-28

## 2022-12-28 DIAGNOSIS — N40.1 BPH WITH OBSTRUCTION/LOWER URINARY TRACT SYMPTOMS: Primary | ICD-10-CM

## 2022-12-28 DIAGNOSIS — N13.8 BPH WITH OBSTRUCTION/LOWER URINARY TRACT SYMPTOMS: Primary | ICD-10-CM

## 2022-12-28 LAB
BILIRUB BLD-MCNC: NEGATIVE MG/DL
CLARITY, POC: CLEAR
COLOR UR: YELLOW
GLUCOSE UR STRIP-MCNC: ABNORMAL MG/DL
KETONES UR QL: NEGATIVE
LEUKOCYTE EST, POC: NEGATIVE
NITRITE UR-MCNC: NEGATIVE MG/ML
PH UR: 5.5 [PH] (ref 5–8)
PROT UR STRIP-MCNC: ABNORMAL MG/DL
RBC # UR STRIP: NEGATIVE /UL
SP GR UR: 1.02 (ref 1–1.03)
UROBILINOGEN UR QL: ABNORMAL

## 2022-12-28 PROCEDURE — 52000 CYSTOURETHROSCOPY: CPT | Performed by: UROLOGY

## 2022-12-28 PROCEDURE — 81001 URINALYSIS AUTO W/SCOPE: CPT | Performed by: UROLOGY

## 2022-12-28 RX ORDER — FINASTERIDE 5 MG/1
5 TABLET, FILM COATED ORAL DAILY
Qty: 30 TABLET | Refills: 3 | Status: SHIPPED | OUTPATIENT
Start: 2022-12-28 | End: 2023-03-13

## 2022-12-29 NOTE — PROGRESS NOTES
Pre- operative diagnosis:  Lower urinary tract symptoms after UroLift procedure by Dr. Badillo in 2017.  He has noticed minimal improvement on tamsulosin.    Post operative diagnosis:  BPH    Procedure:  The patient was prepped and draped in a normal sterile fashion.  The urethra was anesthetized with 2% lidocaine jelly.  A flexible cystoscope was introduced per urethra.      Urethra:  Normal    Bladder:  Normal mucosa, No bladder tumor and mild trabeculation    Ureteral orifices:  Normal position bilaterally and Clear efflux bilaterally    Prostate:  lateral lobe hypertrophy-with kissing lateral lobes and elevated bladder neck, visually obstructing    Patient tolerated the procedure well    Complications: none    Blood loss: minimal    Follow up:    We discussed options for observation, additional medications, or proceeding with transurethral resection of prostate.  He would like to schedule TURP due to the bothersome nature of his LUTS and lack of improvement on alpha-blocker therapy.  He would like to also get off medications.  We discussed risks of surgery including but not limited to infection, bleeding, need for additional procedures, injury to urethra and/or bladder, retrograde ejaculation, need for postoperative urethral catheter, complications of anesthesia.  He voiced understanding and provided full consent to proceed with TURP next month.  I recommended he hold his Eliquis 5 days preoperatively.  He may remain on his baby aspirin.  I recommended starting finasteride preop.      This document has been signed by JACINTA Pope MD on December 28, 2022 19:38 CST

## 2023-01-11 ENCOUNTER — TELEPHONE (OUTPATIENT)
Dept: CARDIOLOGY | Facility: CLINIC | Age: 63
End: 2023-01-11
Payer: OTHER GOVERNMENT

## 2023-01-11 NOTE — TELEPHONE ENCOUNTER
Caller: Carlos A Boyer Jr.    Relationship: Self    Best call back number: [phone]    What is the medical concern/diagnosis: CPAP MACHINE     Any additional details: PATIENT WAS IN THE OFFICE AND WAS TOLD THAT HE WOULD BE REFERRED TO A DR TO PUT AN IMPLANT IN HIS CHEST SINCE HE CAN NOT WEAR HIS CPAP MACHINE. WOULD LIKE A CALL BACK TO DISCUSS.

## 2023-01-16 ENCOUNTER — PRE-ADMISSION TESTING (OUTPATIENT)
Dept: PREADMISSION TESTING | Facility: HOSPITAL | Age: 63
End: 2023-01-16
Payer: OTHER GOVERNMENT

## 2023-01-16 VITALS
HEIGHT: 72 IN | WEIGHT: 262.13 LBS | BODY MASS INDEX: 35.5 KG/M2 | SYSTOLIC BLOOD PRESSURE: 108 MMHG | DIASTOLIC BLOOD PRESSURE: 61 MMHG | RESPIRATION RATE: 20 BRPM | HEART RATE: 67 BPM | OXYGEN SATURATION: 96 %

## 2023-01-16 PROCEDURE — 93005 ELECTROCARDIOGRAM TRACING: CPT | Performed by: UROLOGY

## 2023-01-16 PROCEDURE — 81003 URINALYSIS AUTO W/O SCOPE: CPT | Performed by: UROLOGY

## 2023-01-16 PROCEDURE — 85027 COMPLETE CBC AUTOMATED: CPT | Performed by: UROLOGY

## 2023-01-16 PROCEDURE — 93010 ELECTROCARDIOGRAM REPORT: CPT | Performed by: INTERNAL MEDICINE

## 2023-01-16 PROCEDURE — 80048 BASIC METABOLIC PNL TOTAL CA: CPT | Performed by: UROLOGY

## 2023-01-16 NOTE — DISCHARGE INSTRUCTIONS
Before you come to the hospital        Arrival time: AS DIRECTED BY OFFICE     YOU MAY TAKE THE FOLLOWING MEDICATION(S) THE MORNING OF SURGERY WITH A SIP OF WATER: ***  Metoprolol, keppra     Hold your entresto for 24 hrs prior to surgery    FOLLOW DR FISH INSTRUCTIONS RE: ELIQUS, RANEXA, ASPRIN, AND YOUR INSULIN DOSING IT IS IMPORTANT TO CHECK WITH YOUR DOCTOR RE: HOW TO DOSE YOUR INSULIN FOR SURGERY           ALL OTHER HOME MEDICATION CHECK WITH YOUR PHYSICIAN (especially if   you are taking diabetes medicines or blood thinners)    Do not take any Erectile Dysfunction medications (EX: CIALIS, VIAGRA) 24 hours prior to surgery.      If you were given and instructed to use a germ- killing soap, use as directed the night before surgery and again the morning of surgery or as directed by your surgeon. (Use one-half of the bottle with each shower.)   See attached information for How to Use Chlorhexidine for Bathing if applicable.            Eating and drinking restrictions prior to scheduled arrival time    2 Hours before arrival time STOP   Drinking Clear liquids (water, apple juice-no pulp)     6 Hours before arrival time STOP   Milk or drinks that contain milk, full liquids    6 Hours before arrival time STOP   Light meals or foods, such as toast or cereal    8 Hours before arrival time STOP   Heavy foods, such as meat, fried foods, or fatty foods    (It is extremely important that you follow these guidelines to prevent delay or cancelation of your procedure)     Clear Liquids  Water and flavored water                                                                      Clear Fruit juices, such as cranberry juice and apple juice.  Black coffee (NO cream of any kind, including powdered).  Plain tea  Clear bouillon or broth.  Flavored gelatin.  Soda.  Gatorade or Powerade.  Full liquid examples  Juices that have pulp.  Frozen ice pops that contain fruit pieces.  Coffee with  creamer  Milk.  Yogurt.                MANAGING PAIN AFTER SURGERY    We know you are probably wondering what your pain will be like after surgery.  Following surgery it is unrealistic to expect you will not have pain.   Pain is how our bodies let us know that something is wrong or cautions us to be careful.  That said, our goal is to make your pain tolerable.    Methods we may use to treat your pain include (oral or IV medications, PCAs, epidurals, nerve blocks, etc.)   While some procedures require IV pain medications for a short time after surgery, transitioning to pain medications by mouth allows for better management of pain.   Your nurse will encourage you to take oral pain medications whenever possible.  IV medications work almost immediately, but only last a short while.  Taking medications by mouth allows for a more constant level of medication in your blood stream for a longer period of time.      Once your pain is out of control it is harder to get back under control.  It is important you are aware when your next dose of pain medication is due.  If you are admitted, your nurse may write the time of your next dose on the white board in your room to help you remember.      We are interested in your pain and encourage you to inform us about aggravating factors during your visit.   Many times a simple repositioning every few hours can make a big difference.    If your physician says it is okay, do not let your pain prevent you from getting out of bed. Be sure to call your nurse for assistance prior to getting up so you do not fall.      Before surgery, please decide your tolerable pain goal.  These faces help describe the pain ratings we use on a 0-10 scale.   Be prepared to tell us your goal and whether or not you take pain or anxiety medications at home.          Preparing for Surgery  Preparing for surgery is an important part of your care. It can make things go more smoothly and help you avoid  complications. The steps leading up to surgery may vary among hospitals. Follow all instructions given to you by your health care providers. Ask questions if you do not understand something. Talk about any concerns that you have.  Here are some questions to consider asking before your surgery:  If my surgery is not an emergency (is elective), when would be the best time to have the surgery?  What arrangements do I need to make for work, home, or school?  What will my recovery be like? How long will it be before I can return to normal activities?  Will I need to prepare my home? Will I need to arrange care for me or my children?  Should I expect to have pain after surgery? What are my pain management options? Are there nonmedical options that I can try for pain?  Tell a health care provider about:  Any allergies you have.  All medicines you are taking, including vitamins, herbs, eye drops, creams, and over-the-counter medicines.  Any problems you or family members have had with anesthetic medicines.  Any blood disorders you have.  Any surgeries you have had.  Any medical conditions you have.  Whether you are pregnant or may be pregnant.  What are the risks?  The risks and complications of surgery depend on the specific procedure that you have. Discuss all the risks with your health care providers before your surgery. Ask about common surgical complications, which may include:  Infection.  Bleeding or a need for blood replacement (transfusion).  Allergic reactions to medicines.  Damage to surrounding nerves, tissues, or structures.  A blood clot.  Scarring.  Failure of the surgery to correct the problem.  Follow these instructions before the procedure:  Several days or weeks before your procedure  You may have a physical exam by your primary health care provider to make sure it is safe for you to have surgery.  You may have testing. This may include a chest X-ray, blood and urine tests, electrocardiogram (ECG), or  other testing.  Ask your health care provider about:  Changing or stopping your regular medicines. This is especially important if you are taking diabetes medicines or blood thinners.  Taking medicines such as aspirin and ibuprofen. These medicines can thin your blood. Do not take these medicines unless your health care provider tells you to take them.  Taking over-the-counter medicines, vitamins, herbs, and supplements.  Do not use any products that contain nicotine or tobacco, such as cigarettes and e-cigarettes. If you need help quitting, ask your health care provider.  Avoid alcohol.  Ask your health care provider if there are exercises you can do to prepare for surgery.  Eat a healthy diet.   Plan to have someone take you home from the hospital or clinic.  Plan to have a responsible adult care for you for at least 24 hours after you leave the hospital or clinic. This is important.  The day before your procedure  You may be given antibiotic medicine to take by mouth to help prevent infection. Take it as told by your health care provider.  You may be asked to shower with a germ-killing soap.  Follow instructions from your health care provider about eating and drinking restrictions. This includes gum, mints and hard candy.  Pack comfortable clothes according to your procedure.   The day of your procedure  You may need to take another shower with a germ-killing soap before you leave home in the morning.  With a small sip of water, take only the medicines that you are told to take.  Remove all jewelry including rings.   Leave anything you consider valuable at home except hearing aids if needed.  You do not need to bring your home medications into the hospital.   Do not wear any makeup, nail polish, powder, deodorant, lotion, hair accessories, or anything on your skin or body except your clothes.  If you will be staying in the hospital, bring a case to hold your glasses, contacts, or dentures. You may also want to  bring your robe and non-skid footwear.       (Do not use denture adhesives since you will be asked to remove them during  surgery).   If you wear oxygen at home, bring it with you the day of surgery.  If instructed by your health care provider, bring your sleep apnea device with you on the day of your surgery (if this applies to you).  You may want to leave your suitcase and sleep apnea device in the car until after surgery.   Arrive at the hospital as scheduled.  Bring a friend or family member with you who can help to answer questions and be present while you meet with your health care provider.  At the hospital  When you arrive at the hospital:  Go to registration located at the main entrance of the hospital. You will be registered and given a beeper and a sticker sheet. Take the stickers to the Outpatient nurses desk and place in the black tray. This is to notify staff that you have arrived. Then return to the lobby to wait.   When your beeper lights up and vibrates proceed through the double doors, under the stairs, and a member of the Outpatient Surgery staff will escort you to your preoperative room.  You may have to wear compression sleeves. These help to prevent blood clots and reduce swelling in your legs.  An IV may be inserted into one of your veins.              In the operating room, you may be given one or more of the following:        A medicine to help you relax (sedative).        A medicine to numb the area (local anesthetic).        A medicine to make you fall asleep (general anesthetic).        A medicine that is injected into an area of your body to numb everything below the                      injection site (regional anesthetic).  You may be given an antibiotic through your IV to help prevent infection.  Your surgical site will be marked or identified.    Contact a health care provider if you:  Develop a fever of more than 100.4°F (38°C) or other feelings of illness during the 48 hours before  your surgery.  Have symptoms that get worse.  Have questions or concerns about your surgery.  Summary  Preparing for surgery can make the procedure go more smoothly and lower your risk of complications.  Before surgery, make a list of questions and concerns to discuss with your surgeon. Ask about the risks and possible complications.  In the days or weeks before your surgery, follow all instructions from your health care provider. You may need to stop smoking, avoid alcohol, follow eating restrictions, and change or stop your regular medicines.  Contact your surgeon if you develop a fever or other signs of illness during the few days before your surgery.  This information is not intended to replace advice given to you by your health care provider. Make sure you discuss any questions you have with your health care provider.  Document Revised: 12/21/2018 Document Reviewed: 10/23/2018  [employer] Patient Education © 2021 [employer]

## 2023-01-17 ENCOUNTER — TELEPHONE (OUTPATIENT)
Dept: UROLOGY | Facility: CLINIC | Age: 63
End: 2023-01-17
Payer: OTHER GOVERNMENT

## 2023-01-17 LAB
QT INTERVAL: 428 MS
QTC INTERVAL: 409 MS

## 2023-01-18 NOTE — PROGRESS NOTES
Chief Complaint  Congestive Heart Failure (4wk F/U. Started Aldactone LOV) and Results (Lab)    Subjective          Carlos A Boyer Jr. presents to [id number] for routine follow-up of medication adjustment.  He was started on spironolactone 25 mg daily at his last office visit on 12/15/2022.  Follow-up BMP on 1/16/2023 revealed normal creatinine and potassium.  He has chronic diastolic congestive heart failure, coronary artery disease status post coronary artery stenting and CABG x2 (LIMA to LAD and SVG to OM) 7/6/2019, paroxysmal atrial fibrillation status post cardioversion on chronic anticoagulation, hypertension, hyperlipidemia, type 2 diabetes mellitus, peripheral vascular disease, cerebrovascular accident, obstructive sleep apnea and obesity.  He continues to complain of dyspnea with mild exertion.  He reports intermittent bilateral extremity edema. He reports occasional palpitations as well. Patient denies chest pain, dizziness, syncope, orthopnea, PND or decreased stamina.  Patient denies any signs of bleeding.    Coronary Artery Disease  Presents for follow-up visit. Symptoms include leg swelling, palpitations and shortness of breath. Pertinent negatives include no chest pain, chest pressure, chest tightness, dizziness, muscle weakness or weight gain. Risk factors include hyperlipidemia and obesity. Risk factors do not include hypertension. His past medical history is significant for CHF. The symptoms have been stable. Compliance with diet is variable. Compliance with exercise is variable. Compliance with medications is good.   Atrial Fibrillation  Presents for follow-up visit. Symptoms include palpitations and shortness of breath. Symptoms are negative for an AICD problem, bradycardia, chest pain, dizziness, hemodynamic instability, hypertension, hypotension, pacemaker problem, syncope, tachycardia and weakness. The symptoms have been stable. Past medical history  "includes atrial fibrillation, CAD, CHF and hyperlipidemia. There are no medication compliance problems.   Hypertension  This is a chronic problem. The current episode started more than 1 year ago. The problem is controlled. Associated symptoms include palpitations and shortness of breath. Pertinent negatives include no anxiety, blurred vision, chest pain, headaches, malaise/fatigue, neck pain, orthopnea, peripheral edema, PND or sweats. Risk factors for coronary artery disease include male gender, obesity, diabetes mellitus and dyslipidemia. Current antihypertension treatment includes ACE inhibitors, diuretics and direct vasodilators. The current treatment provides significant improvement. Hypertensive end-organ damage includes CAD/MI, CVA and PVD. Identifiable causes of hypertension include sleep apnea.   Hyperlipidemia  This is a chronic problem. The current episode started more than 1 year ago. Exacerbating diseases include diabetes and obesity. Associated symptoms include shortness of breath. Pertinent negatives include no chest pain. Current antihyperlipidemic treatment includes statins. Risk factors for coronary artery disease include male sex, obesity, hypertension, dyslipidemia and diabetes mellitus.   Congestive Heart Failure  Presents for follow-up visit. Associated symptoms include edema, palpitations and shortness of breath. Pertinent negatives include no abdominal pain, chest pain, chest pressure, claudication, fatigue, muscle weakness, near-syncope, nocturia, orthopnea, paroxysmal nocturnal dyspnea or unexpected weight change. The symptoms have been stable. His past medical history is significant for CAD. Compliance with total regimen is 51-75%. Compliance with diet is 51-75%. Compliance with exercise is 51-75%. Compliance with medications is %.        Objective   Vital Signs:   /82   Pulse 54   Ht 182.9 cm (72\")   Wt 118 kg (260 lb)   SpO2 98%   BMI 35.26 kg/m²     Vitals and nursing " note reviewed.   Constitutional:       General: Awake.      Appearance: Normal and healthy appearance. Well-developed and not in distress. Obese.   Eyes:      General: Lids are normal.      Conjunctiva/sclera: Conjunctivae normal.      Pupils: Pupils are equal, round, and reactive to light.   HENT:      Head: Normocephalic and atraumatic.      Nose: Nose normal.   Neck:      Vascular: No JVR. JVD normal.   Pulmonary:      Effort: Pulmonary effort is normal.      Breath sounds: Examination of the right-upper field reveals decreased breath sounds. Examination of the left-upper field reveals decreased breath sounds. Examination of the right-middle field reveals decreased breath sounds. Examination of the left-middle field reveals decreased breath sounds. Examination of the right-lower field reveals decreased breath sounds. Examination of the left-lower field reveals decreased breath sounds. Decreased breath sounds present. No wheezing. No rhonchi. No rales.   Chest:      Chest wall: Not tender to palpatation.   Cardiovascular:      PMI at left midclavicular line. Bradycardia present. Regular rhythm. Normal S1. Normal S2.      Murmurs: There is a grade 2/6 harsh midsystolic murmur at the URSB, radiating to the neck. There is a grade 2/4 high frequency blowing decrescendo, early diastolic murmur at the URSB, radiating to the apex.      No gallop. No click. No rub.   Pulses:     Intact distal pulses.   Edema:     Peripheral edema absent.   Abdominal:      General: Bowel sounds are normal.      Palpations: Abdomen is soft.      Tenderness: There is no abdominal tenderness.   Musculoskeletal: Normal range of motion.         General: No tenderness.      Cervical back: Normal range of motion. Skin:     General: Skin is warm and dry.   Neurological:      General: No focal deficit present.      Mental Status: Alert, oriented to person, place, and time and oriented to person, place and time.   Psychiatric:         Attention and  Perception: Attention and perception normal.         Mood and Affect: Mood and affect normal.         Speech: Speech normal.         Behavior: Behavior normal. Behavior is cooperative.         Thought Content: Thought content normal.         Cognition and Memory: Cognition and memory normal.         Judgment: Judgment normal.        Result Review :   The following data was reviewed by: TEENA Buckner on 01/19/2023:  Common labs    Common Labs 11/9/22 11/9/22 11/29/22 11/29/22 11/29/22 11/29/22 1/16/23 1/16/23    1306 1306 1115 1115 1452 1452 1031 1031   Glucose  95  209 (A)  131 (A)  138 (A)   BUN  20  18  19  21   Creatinine  1.08  1.40 (A)  1.04  0.95   Sodium  140  141  140  140   Potassium  4.1  4.5  4.1  4.7   Chloride  105  106  106  105   Calcium  10.0  10.0  9.7  9.6   Albumin  4.50  4.40  4.40     Total Bilirubin  0.8  0.7  0.9     Alkaline Phosphatase  90  97  92     AST (SGOT)  17  16  17     ALT (SGPT)  20  23  23     WBC 6.05  5.03  5.47  5.93    Hemoglobin 14.4  13.9  14.2  14.5    Hematocrit 42.7  41.3  43.2  46.9    Platelets 149  118 (A)  123 (A)  115 (A)    (A) Abnormal value            Data reviewed: Cardiology studies 2d echo 4/29/21, Holter monitor 9/11/2022, and Sahra scan 9/21/2022         Assessment and Plan    Diagnoses and all orders for this visit:    1. Chronic diastolic congestive heart failure (HCC) (Primary)-NYHA class II.  Stage C. Compensated.  Reviewed signs and symptoms of CHF and what to report with the patient. Patient instructed to restrict sodium and weigh daily. Report weight gain of greater than 2 lbs overnight or 5 lbs in 1 week. Pt verbalized understanding of instructions and plan of care.  Continue Entresto, Jardiance, spironolactone and metoprolol tartrate.  Consider up titration of spironolactone in the future, if tolerated.    2. Coronary artery disease involving native coronary artery of native heart with stable angina pectoris (HCC)- Continue Imdur and Ranexa.      3. S/P CABG x 2-LIMA to LAD and SVG to OM 7/6/2019 per Dr. Steven [facility] at [facility].  Patient continues on aspirin.  Denies bleeding.    4. Paroxysmal atrial fibrillation (HCC)- no recurrence on recent Holter monitor.  Anticoagulated.  Stable.  Continue metoprolol tartrate.     5. Chronic anticoagulation-continue Eliquis.  Patient denies bleeding.    6. Primary hypertension-blood pressure is mildly elevated in office today, however pt reports well controlled at home with readings consistently lower than 130/80. Continue spironolactone, metoprolol tartrate, amlodipine and Entresto.  Monitor and record daily blood pressure. Report readings consistently higher than 130/80 or consistently lower than 100/60.     7. Hyperlipidemia LDL goal <70-management per PCP.  Continue Crestor and Tricor.    8. Bilateral carotid artery stenosis-patient follows with Dr. Chetan Echavarria with vascular surgery.  Stable.    9. Type 2 diabetes mellitus with other circulatory complication, with long-term current use of insulin ([facility])-management per PCP.  Type 2 diabetes mellitus in the setting of obstructive coronary artery disease.  Continue Jardiance.    10. HOA on CPAP- pt has not been wearing his CPAP for approximately one year due to frequent sinus infections.  Patient has been referred to ENT for evaluation for potential inspire device, however he has yet to be evaluated due to need for sleep study within the last two years. Check sleep study.     11. Class 2 severe obesity due to excess calories with serious comorbidity and body mass index (BMI) of 35.0 to 35.9 in adult ([facility])- Patient's Body mass index is 35.26 kg/m². indicating that he is obese (BMI >30). Obesity-related health conditions include the following: obstructive sleep apnea, hypertension, coronary heart disease, diabetes mellitus, dyslipidemias and peripheral vascular disease. Obesity is unchanged. BMI is is above average; no BMI management plan is  appropriate. We discussed low calorie, low carb based diet program, portion control and increasing exercise.    12. Thoracic aortic aneurysm without rupture (HCC)-patient follows with Dr. Steven Tang with CT surgery for surveillance of 4.4 cm thoracic aortic aneurysm.  Stable.      Follow Up   Return in about 3 months (around 4/19/2023) for Next scheduled follow up.  Patient was given instructions and counseling regarding his condition or for health maintenance advice. Please see specific information pulled into the AVS if appropriate.

## 2023-01-19 ENCOUNTER — TELEPHONE (OUTPATIENT)
Dept: UROLOGY | Facility: CLINIC | Age: 63
End: 2023-01-19
Payer: OTHER GOVERNMENT

## 2023-01-19 ENCOUNTER — OFFICE VISIT (OUTPATIENT)
Dept: CARDIOLOGY | Facility: CLINIC | Age: 63
End: 2023-01-19
Payer: MEDICARE

## 2023-01-19 VITALS
OXYGEN SATURATION: 98 % | WEIGHT: 260 LBS | HEART RATE: 54 BPM | HEIGHT: 72 IN | SYSTOLIC BLOOD PRESSURE: 126 MMHG | DIASTOLIC BLOOD PRESSURE: 82 MMHG | BODY MASS INDEX: 35.21 KG/M2

## 2023-01-19 DIAGNOSIS — Z79.01 CHRONIC ANTICOAGULATION: Chronic | ICD-10-CM

## 2023-01-19 DIAGNOSIS — Z95.1 S/P CABG X 2: Chronic | ICD-10-CM

## 2023-01-19 DIAGNOSIS — I71.20 THORACIC AORTIC ANEURYSM WITHOUT RUPTURE, UNSPECIFIED PART: ICD-10-CM

## 2023-01-19 DIAGNOSIS — I10 PRIMARY HYPERTENSION: Chronic | ICD-10-CM

## 2023-01-19 DIAGNOSIS — I50.32 CHRONIC DIASTOLIC CONGESTIVE HEART FAILURE: Primary | Chronic | ICD-10-CM

## 2023-01-19 DIAGNOSIS — Z79.4 TYPE 2 DIABETES MELLITUS WITH OTHER CIRCULATORY COMPLICATION, WITH LONG-TERM CURRENT USE OF INSULIN: Chronic | ICD-10-CM

## 2023-01-19 DIAGNOSIS — E66.01 CLASS 2 SEVERE OBESITY DUE TO EXCESS CALORIES WITH SERIOUS COMORBIDITY AND BODY MASS INDEX (BMI) OF 35.0 TO 35.9 IN ADULT: ICD-10-CM

## 2023-01-19 DIAGNOSIS — G47.33 OSA ON CPAP: Chronic | ICD-10-CM

## 2023-01-19 DIAGNOSIS — E78.5 HYPERLIPIDEMIA LDL GOAL <70: Chronic | ICD-10-CM

## 2023-01-19 DIAGNOSIS — I25.118 CORONARY ARTERY DISEASE OF NATIVE ARTERY OF NATIVE HEART WITH STABLE ANGINA PECTORIS: ICD-10-CM

## 2023-01-19 DIAGNOSIS — Z99.89 OSA ON CPAP: Chronic | ICD-10-CM

## 2023-01-19 DIAGNOSIS — I65.23 BILATERAL CAROTID ARTERY STENOSIS: Chronic | ICD-10-CM

## 2023-01-19 DIAGNOSIS — I48.0 PAROXYSMAL ATRIAL FIBRILLATION: Chronic | ICD-10-CM

## 2023-01-19 DIAGNOSIS — E11.59 TYPE 2 DIABETES MELLITUS WITH OTHER CIRCULATORY COMPLICATION, WITH LONG-TERM CURRENT USE OF INSULIN: Chronic | ICD-10-CM

## 2023-01-19 PROCEDURE — 99214 OFFICE O/P EST MOD 30 MIN: CPT | Performed by: NURSE PRACTITIONER

## 2023-01-19 NOTE — TELEPHONE ENCOUNTER
Called patient to remind them to arrive at patient registration on 1-23-23 at 10am for the procedure with Dr. Pope. Left message with patient. Told patient if they had any questions to please contact our office at [phone].

## 2023-01-23 ENCOUNTER — TELEPHONE (OUTPATIENT)
Dept: CARDIAC SURGERY | Facility: CLINIC | Age: 63
End: 2023-01-23
Payer: OTHER GOVERNMENT

## 2023-01-23 ENCOUNTER — ANESTHESIA (OUTPATIENT)
Dept: PERIOP | Facility: HOSPITAL | Age: 63
End: 2023-01-23
Payer: OTHER GOVERNMENT

## 2023-01-23 ENCOUNTER — HOSPITAL ENCOUNTER (OUTPATIENT)
Facility: HOSPITAL | Age: 63
Discharge: HOME | End: 2023-01-24
Attending: UROLOGY | Admitting: UROLOGY
Payer: OTHER GOVERNMENT

## 2023-01-23 ENCOUNTER — ANESTHESIA EVENT (OUTPATIENT)
Dept: PERIOP | Facility: HOSPITAL | Age: 63
End: 2023-01-23
Payer: OTHER GOVERNMENT

## 2023-01-23 DIAGNOSIS — N40.1 BPH WITH OBSTRUCTION/LOWER URINARY TRACT SYMPTOMS: ICD-10-CM

## 2023-01-23 DIAGNOSIS — N13.8 BPH WITH OBSTRUCTION/LOWER URINARY TRACT SYMPTOMS: ICD-10-CM

## 2023-01-23 LAB
GLUCOSE BLDC GLUCOMTR-MCNC: 144 MG/DL (ref 70–130)
GLUCOSE BLDC GLUCOMTR-MCNC: 151 MG/DL (ref 70–130)
GLUCOSE BLDC GLUCOMTR-MCNC: 169 MG/DL (ref 70–130)
GLUCOSE BLDC GLUCOMTR-MCNC: 195 MG/DL (ref 70–130)

## 2023-01-23 PROCEDURE — C1894 INTRO/SHEATH, NON-LASER: HCPCS | Performed by: UROLOGY

## 2023-01-23 PROCEDURE — 25010000002 DROPERIDOL PER 5 MG: Performed by: ANESTHESIOLOGY

## 2023-01-23 PROCEDURE — 25010000002 PROPOFOL 10 MG/ML EMULSION: Performed by: NURSE ANESTHETIST, CERTIFIED REGISTERED

## 2023-01-23 PROCEDURE — 52601 PROSTATECTOMY (TURP): CPT | Performed by: UROLOGY

## 2023-01-23 PROCEDURE — 63710000001 SACUBITRIL-VALSARTAN 49-51 MG TABLET: Performed by: UROLOGY

## 2023-01-23 PROCEDURE — A9270 NON-COVERED ITEM OR SERVICE: HCPCS | Performed by: UROLOGY

## 2023-01-23 PROCEDURE — 63710000001 FINASTERIDE 5 MG TABLET: Performed by: UROLOGY

## 2023-01-23 PROCEDURE — 25010000002 ONDANSETRON PER 1 MG: Performed by: NURSE ANESTHETIST, CERTIFIED REGISTERED

## 2023-01-23 PROCEDURE — 82962 GLUCOSE BLOOD TEST: CPT

## 2023-01-23 PROCEDURE — S0260 H&P FOR SURGERY: HCPCS | Performed by: UROLOGY

## 2023-01-23 PROCEDURE — 63710000001 ISOSORBIDE MONONITRATE 60 MG TABLET SUSTAINED-RELEASE 24 HOUR: Performed by: UROLOGY

## 2023-01-23 PROCEDURE — 25010000002 DEXAMETHASONE PER 1 MG: Performed by: NURSE ANESTHETIST, CERTIFIED REGISTERED

## 2023-01-23 PROCEDURE — 63710000001 OXYCODONE-ACETAMINOPHEN 10-325 MG TABLET: Performed by: ANESTHESIOLOGY

## 2023-01-23 PROCEDURE — 25010000002 IOPAMIDOL 61 % SOLUTION: Performed by: UROLOGY

## 2023-01-23 PROCEDURE — 63710000001 LEVETIRACETAM 500 MG TABLET: Performed by: UROLOGY

## 2023-01-23 PROCEDURE — 63710000001 AMLODIPINE 5 MG TABLET: Performed by: UROLOGY

## 2023-01-23 PROCEDURE — 25010000002 CEFAZOLIN PER 500 MG: Performed by: UROLOGY

## 2023-01-23 PROCEDURE — G0378 HOSPITAL OBSERVATION PER HR: HCPCS

## 2023-01-23 PROCEDURE — 63710000001 GABAPENTIN 300 MG CAPSULE: Performed by: UROLOGY

## 2023-01-23 PROCEDURE — 63710000001 DOCUSATE SODIUM 100 MG CAPSULE: Performed by: UROLOGY

## 2023-01-23 PROCEDURE — 63710000001 METOPROLOL TARTRATE 100 MG TABLET: Performed by: UROLOGY

## 2023-01-23 PROCEDURE — 63710000001 RANOLAZINE 500 MG TABLET SUSTAINED-RELEASE 12 HOUR: Performed by: UROLOGY

## 2023-01-23 PROCEDURE — 51102 DRAIN BL W/CATH INSERTION: CPT | Performed by: UROLOGY

## 2023-01-23 PROCEDURE — 25010000002 FENTANYL CITRATE (PF) 100 MCG/2ML SOLUTION: Performed by: NURSE ANESTHETIST, CERTIFIED REGISTERED

## 2023-01-23 PROCEDURE — 63710000001 LAMOTRIGINE 100 MG TABLET: Performed by: UROLOGY

## 2023-01-23 PROCEDURE — 88305 TISSUE EXAM BY PATHOLOGIST: CPT | Performed by: UROLOGY

## 2023-01-23 PROCEDURE — C1769 GUIDE WIRE: HCPCS | Performed by: UROLOGY

## 2023-01-23 PROCEDURE — A9270 NON-COVERED ITEM OR SERVICE: HCPCS | Performed by: ANESTHESIOLOGY

## 2023-01-23 RX ORDER — BUPIVACAINE HCL/0.9 % NACL/PF 0.1 %
2 PLASTIC BAG, INJECTION (ML) EPIDURAL ONCE
Status: COMPLETED | OUTPATIENT
Start: 2023-01-23 | End: 2023-01-23

## 2023-01-23 RX ORDER — LIDOCAINE HYDROCHLORIDE 10 MG/ML
0.5 INJECTION, SOLUTION EPIDURAL; INFILTRATION; INTRACAUDAL; PERINEURAL ONCE AS NEEDED
Status: DISCONTINUED | OUTPATIENT
Start: 2023-01-23 | End: 2023-01-23 | Stop reason: HOSPADM

## 2023-01-23 RX ORDER — NITROGLYCERIN 0.4 MG/1
0.4 TABLET SUBLINGUAL
Status: DISCONTINUED | OUTPATIENT
Start: 2023-01-23 | End: 2023-01-24 | Stop reason: HOSPADM

## 2023-01-23 RX ORDER — AMLODIPINE BESYLATE 5 MG/1
5 TABLET ORAL DAILY
Status: DISCONTINUED | OUTPATIENT
Start: 2023-01-23 | End: 2023-01-24 | Stop reason: HOSPADM

## 2023-01-23 RX ORDER — NICOTINE POLACRILEX 4 MG
15 LOZENGE BUCCAL
Status: DISCONTINUED | OUTPATIENT
Start: 2023-01-23 | End: 2023-01-24 | Stop reason: HOSPADM

## 2023-01-23 RX ORDER — OXYCODONE AND ACETAMINOPHEN 7.5; 325 MG/1; MG/1
2 TABLET ORAL EVERY 4 HOURS PRN
Status: DISCONTINUED | OUTPATIENT
Start: 2023-01-23 | End: 2023-01-23 | Stop reason: HOSPADM

## 2023-01-23 RX ORDER — MAGNESIUM HYDROXIDE 1200 MG/15ML
LIQUID ORAL AS NEEDED
Status: DISCONTINUED | OUTPATIENT
Start: 2023-01-23 | End: 2023-01-23 | Stop reason: HOSPADM

## 2023-01-23 RX ORDER — LIDOCAINE HYDROCHLORIDE 20 MG/ML
INJECTION, SOLUTION EPIDURAL; INFILTRATION; INTRACAUDAL; PERINEURAL AS NEEDED
Status: DISCONTINUED | OUTPATIENT
Start: 2023-01-23 | End: 2023-01-23 | Stop reason: SURG

## 2023-01-23 RX ORDER — SODIUM CHLORIDE 0.9 % (FLUSH) 0.9 %
3 SYRINGE (ML) INJECTION AS NEEDED
Status: DISCONTINUED | OUTPATIENT
Start: 2023-01-23 | End: 2023-01-23 | Stop reason: HOSPADM

## 2023-01-23 RX ORDER — NALOXONE HCL 0.4 MG/ML
0.4 VIAL (ML) INJECTION AS NEEDED
Status: DISCONTINUED | OUTPATIENT
Start: 2023-01-23 | End: 2023-01-23 | Stop reason: HOSPADM

## 2023-01-23 RX ORDER — ASPIRIN 81 MG/1
81 TABLET ORAL DAILY
Status: DISCONTINUED | OUTPATIENT
Start: 2023-01-24 | End: 2023-01-24 | Stop reason: HOSPADM

## 2023-01-23 RX ORDER — SPIRONOLACTONE 25 MG/1
25 TABLET ORAL DAILY
Status: DISCONTINUED | OUTPATIENT
Start: 2023-01-23 | End: 2023-01-24 | Stop reason: HOSPADM

## 2023-01-23 RX ORDER — LEVETIRACETAM 500 MG/1
2000 TABLET ORAL NIGHTLY
Status: DISCONTINUED | OUTPATIENT
Start: 2023-01-23 | End: 2023-01-24 | Stop reason: HOSPADM

## 2023-01-23 RX ORDER — OXYCODONE AND ACETAMINOPHEN 10; 325 MG/1; MG/1
1 TABLET ORAL ONCE AS NEEDED
Status: COMPLETED | OUTPATIENT
Start: 2023-01-23 | End: 2023-01-23

## 2023-01-23 RX ORDER — SODIUM CHLORIDE 0.9 % (FLUSH) 0.9 %
3 SYRINGE (ML) INJECTION EVERY 12 HOURS SCHEDULED
Status: DISCONTINUED | OUTPATIENT
Start: 2023-01-23 | End: 2023-01-23 | Stop reason: HOSPADM

## 2023-01-23 RX ORDER — ISOSORBIDE MONONITRATE 60 MG/1
120 TABLET, EXTENDED RELEASE ORAL DAILY
Status: DISCONTINUED | OUTPATIENT
Start: 2023-01-23 | End: 2023-01-24 | Stop reason: HOSPADM

## 2023-01-23 RX ORDER — LIDOCAINE HYDROCHLORIDE 10 MG/ML
0.5 INJECTION, SOLUTION EPIDURAL; INFILTRATION; INTRACAUDAL; PERINEURAL ONCE AS NEEDED
Status: DISCONTINUED | OUTPATIENT
Start: 2023-01-23 | End: 2023-01-23

## 2023-01-23 RX ORDER — ONDANSETRON 2 MG/ML
4 INJECTION INTRAMUSCULAR; INTRAVENOUS EVERY 6 HOURS PRN
Status: DISCONTINUED | OUTPATIENT
Start: 2023-01-23 | End: 2023-01-24 | Stop reason: HOSPADM

## 2023-01-23 RX ORDER — DEXTROSE MONOHYDRATE 25 G/50ML
25 INJECTION, SOLUTION INTRAVENOUS
Status: DISCONTINUED | OUTPATIENT
Start: 2023-01-23 | End: 2023-01-24 | Stop reason: HOSPADM

## 2023-01-23 RX ORDER — DROPERIDOL 2.5 MG/ML
0.62 INJECTION, SOLUTION INTRAMUSCULAR; INTRAVENOUS ONCE AS NEEDED
Status: COMPLETED | OUTPATIENT
Start: 2023-01-23 | End: 2023-01-23

## 2023-01-23 RX ORDER — METOPROLOL TARTRATE 100 MG/1
100 TABLET ORAL 2 TIMES DAILY
Status: DISCONTINUED | OUTPATIENT
Start: 2023-01-23 | End: 2023-01-24 | Stop reason: HOSPADM

## 2023-01-23 RX ORDER — SODIUM CHLORIDE, SODIUM LACTATE, POTASSIUM CHLORIDE, CALCIUM CHLORIDE 600; 310; 30; 20 MG/100ML; MG/100ML; MG/100ML; MG/100ML
1000 INJECTION, SOLUTION INTRAVENOUS CONTINUOUS
Status: DISCONTINUED | OUTPATIENT
Start: 2023-01-23 | End: 2023-01-23

## 2023-01-23 RX ORDER — ALBUTEROL SULFATE 2.5 MG/3ML
2.5 SOLUTION RESPIRATORY (INHALATION) EVERY 6 HOURS PRN
Status: DISCONTINUED | OUTPATIENT
Start: 2023-01-23 | End: 2023-01-24 | Stop reason: HOSPADM

## 2023-01-23 RX ORDER — HYDROMORPHONE HYDROCHLORIDE 1 MG/ML
0.5 INJECTION, SOLUTION INTRAMUSCULAR; INTRAVENOUS; SUBCUTANEOUS
Status: DISCONTINUED | OUTPATIENT
Start: 2023-01-23 | End: 2023-01-24 | Stop reason: HOSPADM

## 2023-01-23 RX ORDER — FLUTICASONE PROPIONATE 50 MCG
2 SPRAY, SUSPENSION (ML) NASAL DAILY PRN
Status: DISCONTINUED | OUTPATIENT
Start: 2023-01-23 | End: 2023-01-24 | Stop reason: HOSPADM

## 2023-01-23 RX ORDER — LEVETIRACETAM 500 MG/1
1500 TABLET ORAL
Status: DISCONTINUED | OUTPATIENT
Start: 2023-01-24 | End: 2023-01-24 | Stop reason: HOSPADM

## 2023-01-23 RX ORDER — LABETALOL HYDROCHLORIDE 5 MG/ML
5 INJECTION, SOLUTION INTRAVENOUS
Status: DISCONTINUED | OUTPATIENT
Start: 2023-01-23 | End: 2023-01-23 | Stop reason: HOSPADM

## 2023-01-23 RX ORDER — SODIUM CHLORIDE 0.9 % (FLUSH) 0.9 %
3-10 SYRINGE (ML) INJECTION AS NEEDED
Status: DISCONTINUED | OUTPATIENT
Start: 2023-01-23 | End: 2023-01-23 | Stop reason: HOSPADM

## 2023-01-23 RX ORDER — HYDROCODONE BITARTRATE AND ACETAMINOPHEN 10; 325 MG/1; MG/1
1 TABLET ORAL EVERY 4 HOURS PRN
Status: DISCONTINUED | OUTPATIENT
Start: 2023-01-23 | End: 2023-01-24 | Stop reason: HOSPADM

## 2023-01-23 RX ORDER — LAMOTRIGINE 100 MG/1
200 TABLET ORAL 2 TIMES DAILY
Status: DISCONTINUED | OUTPATIENT
Start: 2023-01-23 | End: 2023-01-24 | Stop reason: HOSPADM

## 2023-01-23 RX ORDER — FENTANYL CITRATE 50 UG/ML
25 INJECTION, SOLUTION INTRAMUSCULAR; INTRAVENOUS
Status: DISCONTINUED | OUTPATIENT
Start: 2023-01-23 | End: 2023-01-23 | Stop reason: HOSPADM

## 2023-01-23 RX ORDER — PANTOPRAZOLE SODIUM 40 MG/1
40 TABLET, DELAYED RELEASE ORAL
Status: DISCONTINUED | OUTPATIENT
Start: 2023-01-24 | End: 2023-01-24 | Stop reason: HOSPADM

## 2023-01-23 RX ORDER — HYDROCODONE BITARTRATE AND ACETAMINOPHEN 7.5; 325 MG/1; MG/1
1 TABLET ORAL EVERY 4 HOURS PRN
Status: DISCONTINUED | OUTPATIENT
Start: 2023-01-23 | End: 2023-01-24 | Stop reason: HOSPADM

## 2023-01-23 RX ORDER — GABAPENTIN 300 MG/1
300 CAPSULE ORAL NIGHTLY
Status: DISCONTINUED | OUTPATIENT
Start: 2023-01-23 | End: 2023-01-24 | Stop reason: HOSPADM

## 2023-01-23 RX ORDER — SODIUM CHLORIDE, SODIUM LACTATE, POTASSIUM CHLORIDE, CALCIUM CHLORIDE 600; 310; 30; 20 MG/100ML; MG/100ML; MG/100ML; MG/100ML
100 INJECTION, SOLUTION INTRAVENOUS CONTINUOUS
Status: DISCONTINUED | OUTPATIENT
Start: 2023-01-23 | End: 2023-01-24 | Stop reason: HOSPADM

## 2023-01-23 RX ORDER — ONDANSETRON 2 MG/ML
4 INJECTION INTRAMUSCULAR; INTRAVENOUS ONCE AS NEEDED
Status: DISCONTINUED | OUTPATIENT
Start: 2023-01-23 | End: 2023-01-23 | Stop reason: HOSPADM

## 2023-01-23 RX ORDER — RANOLAZINE 500 MG/1
1000 TABLET, EXTENDED RELEASE ORAL 2 TIMES DAILY
Status: DISCONTINUED | OUTPATIENT
Start: 2023-01-23 | End: 2023-01-24 | Stop reason: HOSPADM

## 2023-01-23 RX ORDER — IBUPROFEN 600 MG/1
600 TABLET ORAL ONCE AS NEEDED
Status: DISCONTINUED | OUTPATIENT
Start: 2023-01-23 | End: 2023-01-23 | Stop reason: HOSPADM

## 2023-01-23 RX ORDER — ONDANSETRON 2 MG/ML
INJECTION INTRAMUSCULAR; INTRAVENOUS AS NEEDED
Status: DISCONTINUED | OUTPATIENT
Start: 2023-01-23 | End: 2023-01-23 | Stop reason: SURG

## 2023-01-23 RX ORDER — ONDANSETRON 4 MG/1
4 TABLET, FILM COATED ORAL EVERY 6 HOURS PRN
Status: DISCONTINUED | OUTPATIENT
Start: 2023-01-23 | End: 2023-01-24 | Stop reason: HOSPADM

## 2023-01-23 RX ORDER — PROPOFOL 10 MG/ML
VIAL (ML) INTRAVENOUS AS NEEDED
Status: DISCONTINUED | OUTPATIENT
Start: 2023-01-23 | End: 2023-01-23 | Stop reason: SURG

## 2023-01-23 RX ORDER — MIDAZOLAM HYDROCHLORIDE 1 MG/ML
1 INJECTION INTRAMUSCULAR; INTRAVENOUS
Status: DISCONTINUED | OUTPATIENT
Start: 2023-01-23 | End: 2023-01-23 | Stop reason: HOSPADM

## 2023-01-23 RX ORDER — FLUMAZENIL 0.1 MG/ML
0.2 INJECTION INTRAVENOUS AS NEEDED
Status: DISCONTINUED | OUTPATIENT
Start: 2023-01-23 | End: 2023-01-23 | Stop reason: HOSPADM

## 2023-01-23 RX ORDER — DEXAMETHASONE SODIUM PHOSPHATE 4 MG/ML
INJECTION, SOLUTION INTRA-ARTICULAR; INTRALESIONAL; INTRAMUSCULAR; INTRAVENOUS; SOFT TISSUE AS NEEDED
Status: DISCONTINUED | OUTPATIENT
Start: 2023-01-23 | End: 2023-01-23 | Stop reason: SURG

## 2023-01-23 RX ORDER — FENTANYL CITRATE 50 UG/ML
INJECTION, SOLUTION INTRAMUSCULAR; INTRAVENOUS AS NEEDED
Status: DISCONTINUED | OUTPATIENT
Start: 2023-01-23 | End: 2023-01-23 | Stop reason: SURG

## 2023-01-23 RX ORDER — SODIUM CHLORIDE 9 MG/ML
40 INJECTION, SOLUTION INTRAVENOUS AS NEEDED
Status: DISCONTINUED | OUTPATIENT
Start: 2023-01-23 | End: 2023-01-23 | Stop reason: HOSPADM

## 2023-01-23 RX ORDER — NICOTINE POLACRILEX 4 MG
15 LOZENGE BUCCAL
Status: DISCONTINUED | OUTPATIENT
Start: 2023-01-23 | End: 2023-01-23 | Stop reason: SDUPTHER

## 2023-01-23 RX ORDER — DOCUSATE SODIUM 100 MG/1
100 CAPSULE, LIQUID FILLED ORAL 2 TIMES DAILY
Status: DISCONTINUED | OUTPATIENT
Start: 2023-01-23 | End: 2023-01-24 | Stop reason: HOSPADM

## 2023-01-23 RX ORDER — ROCURONIUM BROMIDE 10 MG/ML
INJECTION, SOLUTION INTRAVENOUS AS NEEDED
Status: DISCONTINUED | OUTPATIENT
Start: 2023-01-23 | End: 2023-01-23 | Stop reason: SURG

## 2023-01-23 RX ORDER — FINASTERIDE 5 MG/1
5 TABLET, FILM COATED ORAL DAILY
Status: DISCONTINUED | OUTPATIENT
Start: 2023-01-23 | End: 2023-01-24 | Stop reason: HOSPADM

## 2023-01-23 RX ORDER — INSULIN LISPRO 100 [IU]/ML
0-9 INJECTION, SOLUTION INTRAVENOUS; SUBCUTANEOUS
Status: DISCONTINUED | OUTPATIENT
Start: 2023-01-23 | End: 2023-01-24 | Stop reason: HOSPADM

## 2023-01-23 RX ORDER — DEXTROSE MONOHYDRATE 25 G/50ML
25 INJECTION, SOLUTION INTRAVENOUS
Status: DISCONTINUED | OUTPATIENT
Start: 2023-01-23 | End: 2023-01-23 | Stop reason: SDUPTHER

## 2023-01-23 RX ORDER — NALOXONE HCL 0.4 MG/ML
0.1 VIAL (ML) INJECTION
Status: DISCONTINUED | OUTPATIENT
Start: 2023-01-23 | End: 2023-01-24 | Stop reason: HOSPADM

## 2023-01-23 RX ORDER — ACETAMINOPHEN 500 MG
1000 TABLET ORAL ONCE
Status: COMPLETED | OUTPATIENT
Start: 2023-01-23 | End: 2023-01-23

## 2023-01-23 RX ADMIN — SACUBITRIL AND VALSARTAN 1 TABLET: 49; 51 TABLET, FILM COATED ORAL at 22:05

## 2023-01-23 RX ADMIN — ROCURONIUM BROMIDE 40 MG: 10 INJECTION INTRAVENOUS at 12:05

## 2023-01-23 RX ADMIN — RANOLAZINE 1000 MG: 500 TABLET, FILM COATED, EXTENDED RELEASE ORAL at 22:05

## 2023-01-23 RX ADMIN — DEXAMETHASONE SODIUM PHOSPHATE 4 MG: 4 INJECTION, SOLUTION INTRA-ARTICULAR; INTRALESIONAL; INTRAMUSCULAR; INTRAVENOUS; SOFT TISSUE at 12:23

## 2023-01-23 RX ADMIN — SUGAMMADEX 200 MG: 100 INJECTION, SOLUTION INTRAVENOUS at 13:25

## 2023-01-23 RX ADMIN — SODIUM CHLORIDE, POTASSIUM CHLORIDE, SODIUM LACTATE AND CALCIUM CHLORIDE 100 ML/HR: 600; 310; 30; 20 INJECTION, SOLUTION INTRAVENOUS at 15:25

## 2023-01-23 RX ADMIN — FENTANYL CITRATE 100 MCG: 50 INJECTION INTRAMUSCULAR; INTRAVENOUS at 12:30

## 2023-01-23 RX ADMIN — Medication 2 G: at 12:22

## 2023-01-23 RX ADMIN — LEVETIRACETAM 2000 MG: 500 TABLET, FILM COATED ORAL at 22:05

## 2023-01-23 RX ADMIN — SODIUM CHLORIDE, POTASSIUM CHLORIDE, SODIUM LACTATE AND CALCIUM CHLORIDE 100 ML/HR: 600; 310; 30; 20 INJECTION, SOLUTION INTRAVENOUS at 15:59

## 2023-01-23 RX ADMIN — FINASTERIDE 5 MG: 5 TABLET, FILM COATED ORAL at 17:24

## 2023-01-23 RX ADMIN — OXYCODONE AND ACETAMINOPHEN 1 TABLET: 325; 10 TABLET ORAL at 14:23

## 2023-01-23 RX ADMIN — PROPOFOL INJECTABLE EMULSION 200 MG: 10 INJECTION, EMULSION INTRAVENOUS at 12:05

## 2023-01-23 RX ADMIN — AMLODIPINE BESYLATE 5 MG: 5 TABLET ORAL at 17:25

## 2023-01-23 RX ADMIN — DROPERIDOL 0.62 MG: 2.5 INJECTION, SOLUTION INTRAMUSCULAR; INTRAVENOUS at 14:38

## 2023-01-23 RX ADMIN — ISOSORBIDE MONONITRATE 120 MG: 60 TABLET, EXTENDED RELEASE ORAL at 17:25

## 2023-01-23 RX ADMIN — ONDANSETRON 4 MG: 2 INJECTION INTRAMUSCULAR; INTRAVENOUS at 12:23

## 2023-01-23 RX ADMIN — LAMOTRIGINE 200 MG: 100 TABLET ORAL at 22:05

## 2023-01-23 RX ADMIN — DOCUSATE SODIUM 100 MG: 100 CAPSULE, LIQUID FILLED ORAL at 22:05

## 2023-01-23 RX ADMIN — LIDOCAINE HYDROCHLORIDE 100 MG: 20 INJECTION, SOLUTION EPIDURAL; INFILTRATION; INTRACAUDAL; PERINEURAL at 12:05

## 2023-01-23 RX ADMIN — METOPROLOL TARTRATE 100 MG: 100 TABLET ORAL at 22:05

## 2023-01-23 RX ADMIN — GABAPENTIN 300 MG: 300 CAPSULE ORAL at 22:05

## 2023-01-23 RX ADMIN — ACETAMINOPHEN 1000 MG: 500 TABLET, FILM COATED ORAL at 11:51

## 2023-01-23 RX ADMIN — SODIUM CHLORIDE, POTASSIUM CHLORIDE, SODIUM LACTATE AND CALCIUM CHLORIDE 1000 ML: 600; 310; 30; 20 INJECTION, SOLUTION INTRAVENOUS at 10:26

## 2023-01-23 NOTE — OP NOTE
"Operative Summary    Carlos A Boyer Jr.  Date of Procedure: 1/23/2023    Pre-op Diagnosis:   BPH with obstruction/lower urinary tract symptoms [N40.1, N13.8]    Post-op Diagnosis:     Post-Op Diagnosis Codes:     * BPH with obstruction/lower urinary tract symptoms [N40.1, N13.8]    Procedure/CPT® Codes:      Procedure(s):  CYSTOSCOPY TRANSURETHRAL RESECTION OF PROSTATE, PLACEMENT OF SUPRAPUBIC TUBE    Surgeon(s):  Latrell Pope MD    Anesthesia: General    Staff:   Circulator: Liz Kerr RN; Darien Musa RN  Scrub Person: Kenji Rivers; Yamilet Durant    Indications for procedure:  62-year-old male with lifestyle limiting bothersome obstructive LUTS after previous UroLift procedure in 2000 1700 marked elevated bladder neck on office cystoscopy visual obstructing prostatic urethra presented for TURP.    Findings:   Very high bladder neck first treated with transurethral incision of the prostate followed by TURP.  Despite TUIP and TURP, he continued to have a \"tightness\" bladder that despite treatment of his elevated bladder neck would not allow a three-way Drake catheter to be placed per urethra despite attempts to place the three-way hematuria catheter over wire requiring placement of a suprapubic tube percutaneously in order to allow for placement of a [place] tip three-way catheter in the urethra provided for mechanism for continuous bladder irrigation.    Procedure details:  After appropriate anesthesia, positioning, prep and drape, timeout protocol was observed.     The urethra is calibrated with [place] urethral sounds to 30 [place]. There was no need to dilate the urethra based on this calibration for passage of the resectoscope sheath. At this point I passed the well lubricated ACMI Gyrus 27 Israeli continuous flow resectoscope with obturator into the bladder.  I did confirm the saline was to be hung as the irrigant so that I could perform a bipolar cautery procedure to reduce the " risk of TUR syndrome with fluid overload and dilutional hyponatremia.    Using the 30° lens I inspected the urinary bladder.  The bladder was moderately trabeculated. The absence of diverticula and cellules were noted. No mucosal lesions were present. The ureteral orifices were Orthotopic in location , Normal in configuration and Effluxed relatively clear urine.  Prostatic urethra is inspected carefully. There was no evidence of a significant median lobe.  I first performed transurethral incision of the prostate at 5 and 7:00 using the button electrode from the bladder neck to the verumontanum in order to take down his markedly elevated bladder neck.  I then transitioned from the button electrode to the loop electrode and proceeded with transurethral resection of the prostate.  I began resection at 6:00 from the bladder neck to the verumontanum taking care not to undermine the bladder neck.      I then turned my attention to the ventral aspect and lateral lobes.  The capsule is identified at 5 and then 7 o'clock anatomically and this resection is carried out to the level to the verumontanum but not beyond.  I used this as the landmark of the sphincter.  This set up the floor of the prostate nicely.  I resected from bladder neck out to the veru with care being taken not to undermine the bladder neck.  The lateral lobes were now nicely defined.  I resected the right lateral lobe initially from bladder neck out to the verumontanum in a clockwise fashion from the anatomic 7 o'clock to 10 o'clock.  I resected to the level of the capsule again using cautery to minimize bleeding.  This was followed by resecting the left lateral lobe in a counterclockwise fashion from 5 o'clock to 2 o'clock.  Again care was taken not to go beyond the verumontanum.  The majority of the time spent on this resection was at the lateral lobes.  At the conclusion of this the prostatic urethra did appear wide open.    Lastly I did resect some  apical tissue at the bladder neck out to the midportion of the prostatic urethra.  I minimize resection at the level of the sphincter because it is less defined anteriorly.  At the conclusion of this I used an Ellik evacuator as well as the scope with loop to remove any prostate chips.  Any active bleeding was noted and cauterized.  There were no obvious open sinuses.    I attempted to place a 24 Cambodian through a hematuria catheter into the bladder through the urethra.  However it appeared to hang up in the prostatic urethra due to his very high bladder neck.  I then used a regular cystoscope and placed this back into the bladder through which I placed a 0.035 sensor guidewire to the bladder and attempted to place the three-way Drake catheter over this wire.  However the bladder was so elevated that the catheter continue to hang up in the prostatic urethra despite also using a Super Stiff guidewire.  I then felt it was safest to place a suprapubic tube to allow for smaller urethral catheter to be placed and also to provide continuous bladder irrigation.  Under direct vision with the regular cystoscope, I used the JouleX SP introducer kit.  I distended the bladder with irrigation and placed a finder needle 2 fingerbreadths above the pubis in the midline and into the bladder under direct vision, through which I placed a 0.035 guidewire and removed the needle.  I made a small skin incision.  I then used the serial dilators over the wire to dilate the tract finishing with the final trocar with peel-away sheath over the wire and direct vision into the bladder.  I removed the trocar maintaining the sheath in position.  I placed an 18 [place]ian Drake catheter through the sheath into the bladder and inflated the balloon with 10 cc of sterile water.  I removed the sheath.  I then was able to place a Super Stiff wire back into the bladder via the cystoscope and removed the scope.  I then placed a 20 [place] tip Drake  catheter over the wire into the bladder and inflated this balloon with 50 cc of sterile water.  This catheter irrigated freely consistent with placement within the bladder.  I then initiated continuous bladder irrigation with irrigation flowing through the suprapubic tube and out the urethral catheter.  Drainage remained clear.     The catheter was left to gravity drainage.     The patient was transferred from the operating room to the recovery room in stable condition.    Estimated Blood Loss: <30 mL    Specimens:                Specimens     ID Source Type Tests Collected By Collected At Frozen?    A Prostate Tissue · TISSUE PATHOLOGY EXAM   Latrell Pope MD 1/23/23 1232     Description: Prostate tissue            Drains: 20 [place] tip urethral Drake catheter to gravity and 18 [place] SP tube with CBI flowing  Urethral Catheter Double-lumen;Silicone 15 Fr. (Active)       Suprapubic Catheter Double-lumen 18 Fr. (Active)   Dressing Type Other (Comment) 01/23/23 1325       Complications: none    Latrell Pope MD     Date: 1/23/2023  Time: 13:50 CST

## 2023-01-23 NOTE — TELEPHONE ENCOUNTER
Called re: upcoming appointments scheduled for 02/06/2023. No answer.     HUB: If pt returns call, please inform him of CT and office visit appointments scheduled for 02/15/2023. Please include date/time/prep/location. Please document in this telephone encounter.

## 2023-01-23 NOTE — PLAN OF CARE
Goal Outcome Evaluation:  Plan of Care Reviewed With: patient        Progress: no change  Outcome Evaluation: Pt admitted to 3C from PACU following TURP. Suprapubic cath with irrigation and F/C noted. Urine is light pink in color. CBI going at moderate rate. IVF, A&O, VSS, safety maintained

## 2023-01-23 NOTE — ANESTHESIA PROCEDURE NOTES
Airway  Urgency: elective    Date/Time: 1/23/2023 12:10 PM  Airway not difficult    General Information and Staff    Patient location during procedure: OR  CRNA/CAA: Ramin Tinoco CRNA    Indications and Patient Condition  Indications for airway management: airway protection    Preoxygenated: yes  Mask difficulty assessment: 1 - vent by mask    Final Airway Details  Final airway type: endotracheal airway      Successful airway: ETT  Cuffed: yes   Successful intubation technique: video laryngoscopy  Facilitating devices/methods: intubating stylet  Endotracheal tube insertion site: oral  Blade: Aguilar  Blade size: 4  ETT size (mm): 7.0  Cormack-Lehane Classification: grade IIa - partial view of glottis  Placement verified by: chest auscultation and capnometry   Cuff volume (mL): 7  Measured from: lips  ETT/EBT  to lips (cm): 21  Number of attempts at approach: 1  Assessment: lips, teeth, and gum same as pre-op and atraumatic intubation    Additional Comments  Atraumatic intubation

## 2023-01-23 NOTE — H&P
Subjective     Mr. Boyer is 62 y.o. male     Chief Complaint: 1 month follow up nocturia/medication     History of Present Illness  Patient 62-year-old gentleman with worsening urinary symptoms weaker stream flow getting up multiple times at night urgency and frequency.  His postvoid residual today was 0 mL.  He has a history of UroLift that was done 08/25/2017.  I had increased him to 2 tamsulosin daily and patient states he has had no change or improvement in symptoms.  Cystoscopy last month showed visually obstructing prostate with elevated bladder neck for which he would like to proceed today with TURP.      The following portions of the patient's history were reviewed and updated as appropriate: allergies, current medications, past family history, past medical history, past social history, past surgical history and problem list.     Review of Systems   Gastrointestinal: Negative.    Genitourinary: Positive for difficulty urinating, frequency and urgency. Negative for dysuria, flank pain and hematuria.   All other systems reviewed and are negative.           Current Outpatient Medications:   •  albuterol (PROVENTIL HFA;VENTOLIN HFA) 108 (90 BASE) MCG/ACT inhaler, Inhale 2 puffs Every 6 (Six) Hours As Needed for wheezing., Disp: , Rfl:   •  amLODIPine (Norvasc) 5 MG tablet, Take 1 tablet by mouth Daily., Disp: 30 tablet, Rfl: 11  •  apixaban (ELIQUIS) 5 MG tablet tablet, Take 1 tablet by mouth Every 12 (Twelve) Hours., Disp: 180 tablet, Rfl: 3  •  aspirin 81 MG chewable tablet, Chew 81 mg Daily., Disp: , Rfl:   •  calcium polycarbophil (FIBERCON) 625 MG tablet, Take 625 mg by mouth As Needed., Disp: , Rfl:   •  carboxymethylcellulose (REFRESH PLUS) 0.5 % solution, Administer 1 drop to both eyes 4 (Four) Times a Day As Needed for Dry Eyes., Disp: , Rfl:   •  empagliflozin (JARDIANCE) 10 MG tablet tablet, Take 10 mg by mouth Daily., Disp: , Rfl:   •  ezetimibe (ZETIA) 10 MG tablet, Take 1 tablet by mouth Daily.,  Disp: , Rfl:   •  fenofibrate (Tricor) 145 MG tablet, Take 1 tablet by mouth Daily., Disp: 30 tablet, Rfl: 2  •  fluticasone (FLONASE) 50 MCG/ACT nasal spray, 2 sprays into each nostril Daily., Disp: , Rfl:   •  gabapentin (Neurontin) 300 MG capsule, Take 1 capsule by mouth Every Night. Please overnight., Disp: 30 capsule, Rfl: 2  •  insulin aspart (novoLOG FLEXPEN) 100 UNIT/ML solution pen-injector sc pen, Inject 40 Units under the skin into the appropriate area as directed Every Morning. 40 units 3 times daily, Disp: , Rfl:   •  insulin detemir (LEVEMIR) 100 UNIT/ML injection, Inject 100 Units under the skin into the appropriate area as directed Daily. 100AM 110 units PM, Disp: , Rfl:   •  isosorbide mononitrate (IMDUR) 120 MG 24 hr tablet, Take 1 tablet by mouth Daily., Disp: 90 tablet, Rfl: 3  •  lamoTRIgine (LaMICtal) 200 MG tablet, Take 1 tablet by mouth 2 (Two) Times a Day., Disp: 180 tablet, Rfl: 1  •  levETIRAcetam (KEPPRA) 500 MG tablet, Take 3 tablets every morning, take 4 tablets every night., Disp: 630 tablet, Rfl: 1  •  Liraglutide (VICTOZA SC), Inject 1.2 mg under the skin into the appropriate area as directed Every Night., Disp: , Rfl:   •  metFORMIN (GLUCOPHAGE) 1000 MG tablet, Take 1 tablet by mouth 2 (Two) Times a Day With Meals. HOLD x 48 hours post contrast. May resume 3/18/18, Disp: , Rfl:   •  metoprolol tartrate (LOPRESSOR) 100 MG tablet, Take 100 mg by mouth 2 (Two) Times a Day., Disp: , Rfl:   •  Multiple Vitamin (MULTI VITAMIN PO), Take 1 tablet by mouth Daily., Disp: , Rfl:   •  nitroglycerin (NITROSTAT) 0.4 MG SL tablet, Place 0.4 mg under the tongue Every 5 (Five) Minutes As Needed for Chest Pain. Take no more than 3 doses in 15 minutes., Disp: , Rfl:   •  pantoprazole (PROTONIX) 40 MG EC tablet, Take 40 mg by mouth 2 (Two) Times a Day., Disp: , Rfl:   •  psyllium (METAMUCIL) 58.6 % packet, Take 1 packet by mouth Daily As Needed (constipation)., Disp: , Rfl:   •  ranolazine (Ranexa)  1000 MG 12 hr tablet, Take 1 tablet by mouth 2 (Two) Times a Day., Disp: 60 tablet, Rfl: 11  •  Rimegepant Sulfate (Nurtec) 75 MG tablet dispersible tablet, Take 1 tablet by mouth As Needed (Migraine)., Disp: 8 tablet, Rfl: 5  •  rosuvastatin (CRESTOR) 20 MG tablet, Take 20 mg by mouth Every Night., Disp: , Rfl:   •  sacubitril-valsartan (Entresto)  MG tablet, Take 1 tablet by mouth 2 (Two) Times a Day., Disp: 180 tablet, Rfl: 3  •  tamsulosin (FLOMAX) 0.4 MG capsule 24 hr capsule, Take 2 capsules by mouth Every Night for 360 days., Disp: 180 capsule, Rfl: 3     Medical History        Past Medical History:   Diagnosis Date   • Arthritis     • Asthma     • Cancer ([facility])     • Carotid disease, bilateral ([facility])     • Chest pain     • Chronic diastolic congestive heart failure ([facility]) 9/7/2019   • Chronic sinusitis     • Maribell bullosa     • Coronary artery disease involving native coronary artery of native heart with unstable angina pectoris ([facility]) 1/17/2017   • Deviated septum     • Diabetes mellitus ([facility])     • Difficulty urinating     • Diverticulitis     • Enlarged prostate     • Fatty liver     • GERD (gastroesophageal reflux disease)     • Hyperlipidemia LDL goal <70 2/2/2017   • Hypertrophy of nasal turbinates     • Keratoderma     • Kidney stone     • Migraine     • Murmur, heart     • Myocardial infarction ([facility])     • Obesity     • Paroxysmal atrial fibrillation ([facility]) 7/11/2019   • PONV (postoperative nausea and vomiting)     • Primary hypertension 10/16/2016   • Psoriasis     • Seizures ([facility])     • Sinus congestion     • Skin cancer     • Sleep apnea     • SOB (shortness of breath)     • Stroke ([facility])     • UTI (urinary tract infection)              Surgical History         Past Surgical History:   Procedure Laterality Date   • CARDIAC CATHETERIZATION   01/2016     Dr. Broadbent; widely patent previously placed stents in the left anterior descending and obstructive disease involving the diagonal branch which  was treated medically   • CARDIAC CATHETERIZATION N/A 7/14/2017     Procedure: Left Heart Cath;  Surgeon: Wade Ramey MD;  Location:  [facility] CATH INVASIVE [facility];  Service:    • CARDIAC CATHETERIZATION Left 10/15/2018     Procedure: Cardiac Catheterization/Vascular Study;  Surgeon: Wade Ramey MD;  Location:  [facility]ATH [facility];  Service: Cardiology   • CARDIAC CATHETERIZATION   10/15/2018     Procedure: Functional Flow [place];  Surgeon: Wade Ramey MD;  Location:  [facility]ATH [facility];  Service: Cardiology   • CARDIAC CATHETERIZATION N/A 10/15/2018     Procedure: Left ventriculography;  Surgeon: Wade Ramey MD;  Location:  [facility];  Service: Cardiology   • CARDIAC CATHETERIZATION Left 6/26/2019     Procedure: Cardiac Catheterization/Vascular Study VEL OK  HE WILL WAIT 1 YEAR FOR SHOULDER SURGERY ;  Surgeon: Wade Ramey MD;  Location:  [facility];  Service: Cardiology   • CARDIAC CATHETERIZATION Left 4/30/2021     Procedure: Coronary angiography;  Surgeon: Sahil Llamas MD;  Location: [facility] CATH INVASIVE LOCATION;  Service: Cardiology;  Laterality: Left;   • CARDIAC CATHETERIZATION N/A 4/30/2021     Procedure: Percutaneous Coronary Intervention;  Surgeon: Sahil Llamas MD;  Location:  [facility]ATH [facility];  Service: Cardiology;  Laterality: N/A;   • CARDIAC CATHETERIZATION N/A 11/9/2022     Procedure: Left Heart Cath with SVGs;  Surgeon: Wade Ramey MD;  Location:  [facility];  Service: Cardiology;  Laterality: N/A;   • CHOLECYSTECTOMY       • CHOLECYSTECTOMY WITH INTRAOPERATIVE CHOLANGIOGRAM N/A 8/1/2018     Procedure: CHOLECYSTECTOMY LAPAROSCOPIC INTRAOPERATIVE CHOLANGIOGRAM;  Surgeon: Shane Ann MD;  Location: [facility] OR;  Service: General   • COLONOSCOPY N/A 7/14/2020     Procedure: COLONOSCOPY WITH ANESTHESIA;  Surgeon: Anupam Morales DO;  Location: [facility] ENDOSCOPY;  Service: Gastroenterology;   Laterality: N/A;  pre: abdominal pain  post: diverticulosis  Vinayak Correia PA   • CORONARY ANGIOPLASTY       • CORONARY ARTERY BYPASS GRAFT N/A 7/6/2019     Procedure: CABG X2 WITH LIMA, LEFT LEG OVH, AND PLACEMENT OF LEFT FEMORAL ARTERIAL LINE;  Surgeon: Steven Tang MD;  Location: [facility] OR;  Service: Cardiothoracic   • CORONARY STENT PLACEMENT         x 6   • ENDOSCOPIC FUNCTIONAL SINUS SURGERY (FESS) Bilateral 12/13/2017     Procedure: PROCEDURE PERFORMED:  Bilateral functional endoscopic anterior ethmoidectomy with bilateral middle meatal antrostomy Septoplasty Right kathia bullosa resection Bilateral inferior turbinate reduction via Coblation;  Surgeon: Mayank Ibarra MD;  Location:  [facility] OR;  Service:    • ENDOSCOPY N/A 7/30/2018     Procedure: ESOPHAGOGASTRODUODENOSCOPY WITH ANESTHESIA;  Surgeon: Benitez Mas MD;  Location: [facility] ENDOSCOPY;  Service: Gastroenterology   • ENDOSCOPY N/A 7/14/2020     Procedure: ESOPHAGOGASTRODUODENOSCOPY WITH ANESTHESIA;  Surgeon: Anupam Morales DO;  Location: [facility] ENDOSCOPY;  Service: Gastroenterology;  Laterality: N/A;  pre: abdominal pain  post: esophagitis  Vinayak Correia PA   • HERNIA REPAIR         x2 inguinal area   • KIDNEY STONE SURGERY       • KNEE ARTHROSCOPY Right 3/1/2022     Procedure: RIGHT KNEE PARTIAL LATERAL MENISCECTOMY;  Surgeon: Pedro Pablo Song MD;  Location: [facility] OR;  Service: Orthopedics;  Laterality: Right;   • KNEE SURGERY Right     • OTHER SURGICAL HISTORY         urolift   • PROSTATE SURGERY         Dr. Badillo - 2017   • ROTATOR CUFF REPAIR       • THUMB AMPUTATION Left       partial   • TOE AMPUTATION Right       big            Social History   Social History            Socioeconomic History   • Marital status:    Tobacco Use   • Smoking status: Former       Packs/day: 1.50       Years: 4.50       Pack years: 6.75       Types: Cigarettes, Cigars       Quit date: 1993       Years since quitting:  "29.9   • Smokeless tobacco: Former       Types: Chew       Quit date: 2009   Vaping Use   • Vaping Use: Never used   Substance and Sexual Activity   • Alcohol use: No   • Drug use: No   • Sexual activity: Defer            Family History   Problem Relation Age of Onset   • Heart disease Father     • COPD Mother     • Hypertension Mother     • Asthma Mother     • No Known Problems Sister     • Colon cancer Paternal Uncle     • Prostate cancer Maternal Grandfather     • No Known Problems Sister     • Colon cancer Maternal Grandmother     • Colon polyps Maternal Grandmother           Objective     Temp 97.3 °F (36.3 °C)   Ht 97.3 cm (38.31\")   Wt 117 kg (258 lb 12.8 oz)   .00 kg/m²      Physical Exam  Vitals reviewed.   Constitutional:       General: He is not in acute distress.     Appearance: Normal appearance.   HENT:      Head: Normocephalic and atraumatic.   Pulmonary:      Effort: Pulmonary effort is normal.   Neurological:      Mental Status: He is alert and oriented to person, place, and time.   Psychiatric:         Mood and Affect: Mood normal.         Behavior: Behavior normal.                        Results for orders placed or performed in visit on 12/05/22   POC Urinalysis Dipstick, Multipro     Specimen: Urine   Result Value Ref Range     Color Yellow Yellow, Straw, Dark Yellow, Rosalia     Clarity, UA Clear Clear     Glucose,  mg/dL (A) Negative mg/dL     Bilirubin Negative Negative     Ketones, UA Negative Negative     Specific Gravity  1.020 1.005 - 1.030     Blood, UA Negative Negative     pH, Urine 5.5 5.0 - 8.0     Protein, POC Negative Negative mg/dL     Urobilinogen, UA 0.2 E.U./dL Normal, 0.2 E.U./dL     Nitrite, UA Negative Negative     Leukocytes Negative Negative   Bladder Scan interpretation  Estimation of residual urine via abdominal ultrasound  Residual Urine: 0ml  Indication: nocturia  Position: Supine  Examination: Incremental scanning of the suprapubic area using 3 MHz " transducer using copious amounts of acoustic gel.   Findings: An anechoic area was demonstrated which represented the bladder, with measurement of residual urine as noted. I inspected this myself. In that the residual urine was stable or insignificant, no treatment will be necessary at this time.   IPSS Questionnaire (AUA-7):  Incomplete emptying  Over the past month, how often have you had a sensation of not emptying your bladder completely after you finish?: Almost always (12/05/22 0906)  Frequency  Over the past month, how often have you had to urinate again less than two hours after you finishing urinating ?: Less than half the time (12/05/22 0906)  Intermittency  Over the past month, how often have you found you stopped and started again several time when you urinated ?: About half the time (12/05/22 0906)  Urgency  Over the last month, how difficult  have you found it to postpone urination ?: Not at all (12/05/22 0906)  Weak Stream  Over the past month, how often have you had a weak urinary stream ?: Less than 1 time in 5 (12/05/22 0906)  Straining  Over the past month, how often have you had to push or strain to begin urination ?: Not at all (12/05/22 0906)  Nocturia  Over the past month, how many times did you most typically get up to urinate from the time you went to bed until the time you got up in the morning ?: Almost always (12/05/22 0906)  Quality of life due to urinary symptoms  If you were to spend the rest of your life with your urinary condition the way it is now, how would feel about that?: Unhappy (12/05/22 0906)     Scores  Total IPSS Score: 16 (12/05/22 0906)  Total Score = Symtomatic Level: moderately symptomatic: 8-19 (12/05/22 0906)         Assessment and Plan     Diagnoses and all orders for this visit:     1. Nocturia (Primary)  -     POC Urinalysis Dipstick, Multipro     2. BPH with obstruction/lower urinary tract symptoms     We previously discussed options for observation, additional  medications, or proceeding with transurethral resection of prostate.  He would like to schedule TURP due to the bothersome nature of his LUTS and lack of improvement on alpha-blocker therapy.  He would like to also get off medications.  We discussed risks of surgery including but not limited to infection, bleeding, need for additional procedures, injury to urethra and/or bladder, retrograde ejaculation, need for postoperative urethral catheter, complications of anesthesia.  He voiced understanding and provided full consent to proceed with TURP next month.  I recommended he hold his Eliquis 5 days preoperatively.  He may remain on his baby aspirin.  I recommended starting finasteride preop.

## 2023-01-23 NOTE — ANESTHESIA PREPROCEDURE EVALUATION
Anesthesia Evaluation     Patient summary reviewed   history of anesthetic complications: PONV  NPO Solid Status: > 8 hours             Airway   Mallampati: II  TM distance: >3 FB  Neck ROM: full  Dental    (+) poor dentition        Pulmonary    (+) sleep apnea (scheduling sleep study/CN XII stimulator ),   (-) COPD, asthma, not a smoker  Cardiovascular   Exercise tolerance: good (4-7 METS)    ECG reviewed  PT is on anticoagulation therapy  Patient on routine beta blocker    (+) hypertension, CAD, CABG, dysrhythmias Atrial Fib, hyperlipidemia,  carotid artery disease  (-) pacemaker, past MI, angina, CHF, cardiac stents      Neuro/Psych  (+) seizures well controlled, CVA,    (-) TIA  GI/Hepatic/Renal/Endo    (+) obesity,   diabetes mellitus using insulin,   (-) GERD, liver disease, no renal disease    Musculoskeletal     Abdominal   (+) obese,    Substance History      OB/GYN          Other   arthritis,    history of cancer                      Anesthesia Plan    ASA 3     general     intravenous induction     Anesthetic plan, risks, benefits, and alternatives have been provided, discussed and informed consent has been obtained with: patient.        CODE STATUS:

## 2023-01-24 VITALS
TEMPERATURE: 97.8 F | OXYGEN SATURATION: 96 % | HEIGHT: 72 IN | HEART RATE: 58 BPM | BODY MASS INDEX: 34.76 KG/M2 | RESPIRATION RATE: 18 BRPM | WEIGHT: 256.62 LBS | DIASTOLIC BLOOD PRESSURE: 62 MMHG | SYSTOLIC BLOOD PRESSURE: 114 MMHG

## 2023-01-24 LAB
GLUCOSE BLDC GLUCOMTR-MCNC: 189 MG/DL (ref 70–130)
GLUCOSE BLDC GLUCOMTR-MCNC: 201 MG/DL (ref 70–130)

## 2023-01-24 PROCEDURE — A9270 NON-COVERED ITEM OR SERVICE: HCPCS | Performed by: UROLOGY

## 2023-01-24 PROCEDURE — 82962 GLUCOSE BLOOD TEST: CPT

## 2023-01-24 PROCEDURE — 63710000001 ISOSORBIDE MONONITRATE 60 MG TABLET SUSTAINED-RELEASE 24 HOUR: Performed by: UROLOGY

## 2023-01-24 PROCEDURE — 63710000001 RANOLAZINE 500 MG TABLET SUSTAINED-RELEASE 12 HOUR: Performed by: UROLOGY

## 2023-01-24 PROCEDURE — 63710000001 LEVETIRACETAM 500 MG TABLET: Performed by: UROLOGY

## 2023-01-24 PROCEDURE — 63710000001 DOCUSATE SODIUM 100 MG CAPSULE: Performed by: UROLOGY

## 2023-01-24 PROCEDURE — 63710000001 PANTOPRAZOLE 40 MG TABLET DELAYED-RELEASE: Performed by: UROLOGY

## 2023-01-24 PROCEDURE — 63710000001 ASPIRIN 81 MG TABLET DELAYED-RELEASE: Performed by: UROLOGY

## 2023-01-24 PROCEDURE — 63710000001 LAMOTRIGINE 100 MG TABLET: Performed by: UROLOGY

## 2023-01-24 PROCEDURE — 63710000001 METOPROLOL TARTRATE 100 MG TABLET: Performed by: UROLOGY

## 2023-01-24 PROCEDURE — 63710000001 SACUBITRIL-VALSARTAN 49-51 MG TABLET: Performed by: UROLOGY

## 2023-01-24 PROCEDURE — 99024 POSTOP FOLLOW-UP VISIT: CPT | Performed by: UROLOGY

## 2023-01-24 PROCEDURE — 63710000001 FINASTERIDE 5 MG TABLET: Performed by: UROLOGY

## 2023-01-24 PROCEDURE — 63710000001 AMLODIPINE 5 MG TABLET: Performed by: UROLOGY

## 2023-01-24 PROCEDURE — G0378 HOSPITAL OBSERVATION PER HR: HCPCS

## 2023-01-24 PROCEDURE — 63710000001 SPIRONOLACTONE 25 MG TABLET: Performed by: UROLOGY

## 2023-01-24 RX ORDER — GUAIFENESIN 600 MG/1
1200 TABLET, EXTENDED RELEASE ORAL AS NEEDED
COMMUNITY

## 2023-01-24 RX ORDER — SULFAMETHOXAZOLE AND TRIMETHOPRIM 800; 160 MG/1; MG/1
1 TABLET ORAL 2 TIMES DAILY
Qty: 20 TABLET | Refills: 0 | Status: SHIPPED | OUTPATIENT
Start: 2023-01-24 | End: 2023-02-03

## 2023-01-24 RX ORDER — ACETAMINOPHEN 325 MG/1
650 TABLET ORAL AS NEEDED
COMMUNITY
End: 2023-01-24 | Stop reason: HOSPADM

## 2023-01-24 RX ORDER — PHENAZOPYRIDINE HYDROCHLORIDE 100 MG/1
100 TABLET, FILM COATED ORAL 3 TIMES DAILY PRN
Qty: 20 TABLET | Refills: 1 | Status: SHIPPED | OUTPATIENT
Start: 2023-01-24 | End: 2023-02-23

## 2023-01-24 RX ORDER — HYDROCODONE BITARTRATE AND ACETAMINOPHEN 5; 325 MG/1; MG/1
1 TABLET ORAL EVERY 6 HOURS PRN
Qty: 10 TABLET | Refills: 0 | Status: SHIPPED | OUTPATIENT
Start: 2023-01-24 | End: 2023-02-23

## 2023-01-24 RX ORDER — OXYBUTYNIN CHLORIDE 5 MG/1
5 TABLET ORAL EVERY 8 HOURS PRN
Qty: 30 TABLET | Refills: 1 | Status: SHIPPED | OUTPATIENT
Start: 2023-01-24

## 2023-01-24 RX ORDER — DOCUSATE SODIUM 100 MG/1
100 CAPSULE, LIQUID FILLED ORAL 2 TIMES DAILY
Qty: 60 CAPSULE | Refills: 1 | Status: SHIPPED | OUTPATIENT
Start: 2023-01-24

## 2023-01-24 RX ORDER — ONDANSETRON 4 MG/1
4 TABLET, ORALLY DISINTEGRATING ORAL EVERY 6 HOURS PRN
Qty: 6 TABLET | Refills: 1 | Status: SHIPPED | OUTPATIENT
Start: 2023-01-24 | End: 2023-02-23

## 2023-01-24 RX ADMIN — ASPIRIN 81 MG: 81 TABLET, COATED ORAL at 08:17

## 2023-01-24 RX ADMIN — FINASTERIDE 5 MG: 5 TABLET, FILM COATED ORAL at 08:16

## 2023-01-24 RX ADMIN — LEVETIRACETAM 1500 MG: 500 TABLET, FILM COATED ORAL at 08:17

## 2023-01-24 RX ADMIN — SODIUM CHLORIDE, POTASSIUM CHLORIDE, SODIUM LACTATE AND CALCIUM CHLORIDE 100 ML/HR: 600; 310; 30; 20 INJECTION, SOLUTION INTRAVENOUS at 01:40

## 2023-01-24 RX ADMIN — METOPROLOL TARTRATE 100 MG: 100 TABLET ORAL at 08:16

## 2023-01-24 RX ADMIN — AMLODIPINE BESYLATE 5 MG: 5 TABLET ORAL at 08:17

## 2023-01-24 RX ADMIN — PANTOPRAZOLE SODIUM 40 MG: 40 TABLET, DELAYED RELEASE ORAL at 05:08

## 2023-01-24 RX ADMIN — ISOSORBIDE MONONITRATE 120 MG: 60 TABLET, EXTENDED RELEASE ORAL at 08:17

## 2023-01-24 RX ADMIN — SPIRONOLACTONE 25 MG: 25 TABLET ORAL at 08:16

## 2023-01-24 RX ADMIN — DOCUSATE SODIUM 100 MG: 100 CAPSULE, LIQUID FILLED ORAL at 08:17

## 2023-01-24 RX ADMIN — LAMOTRIGINE 200 MG: 100 TABLET ORAL at 08:17

## 2023-01-24 RX ADMIN — SACUBITRIL AND VALSARTAN 1 TABLET: 49; 51 TABLET, FILM COATED ORAL at 08:16

## 2023-01-24 RX ADMIN — RANOLAZINE 1000 MG: 500 TABLET, FILM COATED, EXTENDED RELEASE ORAL at 08:16

## 2023-01-24 NOTE — ANESTHESIA POSTPROCEDURE EVALUATION
"Patient: Carlos A Boyer Jr.    Procedure Summary     Date: 01/23/23 Room / [facility]:  [facility] OR 01 /  [facility] OR    Anesthesia Start: 1156 Anesthesia Stop: 1339    Procedure: CYSTOSCOPY TRANSURETHRAL RESECTION OF PROSTATE (Bladder) Diagnosis:       BPH with obstruction/lower urinary tract symptoms      (BPH with obstruction/lower urinary tract symptoms [N40.1, N13.8])    Surgeons: Latrell Pope MD Provider: Ramin Tinoco CRNA    Anesthesia Type: general ASA Status: 3          Anesthesia Type: general    Vitals  Vitals Value Taken Time   /77 01/23/23 1518   Temp 98.1 °F (36.7 °C) 01/23/23 1500   Pulse 65 01/23/23 1532   Resp 15 01/23/23 1500   SpO2 96 % 01/23/23 1531   Vitals shown include unvalidated device data.        Post Anesthesia Care and Evaluation    Patient location during evaluation: PACU  Patient participation: complete - patient participated  Level of consciousness: awake and alert  Pain management: adequate    Airway patency: patent  Anesthetic complications: No anesthetic complications    Cardiovascular status: acceptable  Respiratory status: acceptable  Hydration status: acceptable    Comments: Blood pressure 123/69, pulse 66, temperature 97.6 °F (36.4 °C), temperature source Oral, resp. rate 16, height 182.5 cm (71.85\"), weight 116 kg (256 lb 9.9 oz), SpO2 98 %.    Pt discharged from PACU based on nawaf score >8      "

## 2023-01-24 NOTE — PLAN OF CARE
Goal Outcome Evaluation:           Progress: no change  Outcome Evaluation: Pt denies pain, CBI cont with clear light pink output, cont. IVF, lucia diet

## 2023-01-24 NOTE — CASE MANAGEMENT/SOCIAL WORK
Discharge Planning Assessment  [facility]     Patient Name: Carlos A Boyer Jr.  MRN: [id number]  Today's Date: 1/24/2023    Admit Date: 1/23/2023        Discharge Needs Assessment     Row Name 01/24/23 0932       Living Environment    People in Home spouse    Name(s) of People in Home Daniela    Current Living Arrangements home    Primary Care Provided by spouse/significant other    Provides Primary Care For no one    Family Caregiver if Needed spouse    Family Caregiver Names Daniela    Able to Return to Prior Arrangements yes       Resource/Environmental Concerns    Resource/Environmental Concerns none       Transition Planning    Patient/Family Anticipates Transition to home with family    Transportation Anticipated family or friend will provide       Discharge Needs Assessment    Readmission Within the Last 30 Days no previous admission in last 30 days    Equipment Currently Used at Home rollator    Concerns to be Addressed no discharge needs identified    Equipment Needed After Discharge none    Discharge Coordination/Progress spoke to patient who lives with spouse; has RX coverage with [facility] and PCP; independent prior to illness will follow for DC needs               Discharge Plan    No documentation.               Continued Care and Services - Admitted Since 1/23/2023    Coordination has not been started for this encounter.       Expected Discharge Date and Time     Expected Discharge Date Expected Discharge Time    Jan 24, 2023          Demographic Summary    No documentation.                Functional Status    No documentation.                Psychosocial    No documentation.                Abuse/Neglect    No documentation.                Legal    No documentation.                Substance Abuse    No documentation.                Patient Forms    No documentation.                   Skye Kline RN

## 2023-01-24 NOTE — DISCHARGE SUMMARY
Date of Discharge:  1/24/2023    Discharge Diagnosis:   #1.  BPH status post TURP    Presenting Problem/History of Present Illness  BPH with obstruction/lower urinary tract symptoms [N40.1, N13.8]       Hospital Course  Patient is a 62 y.o. male who presented for transurethral resection of the prostate.  He tolerated surgery well on 1/23/2023.  He recovered uneventfully on the regular floor.  He remained afebrile with stable vital signs.  Postoperatively, he tolerated regular diet and was maintained on continuous bladder irrigation.   On postoperative day 1, his urine remained clear with CBI clamped.  His pain was well controlled.  He desired to go home    Procedures Performed  Procedure(s):  CYSTOSCOPY TRANSURETHRAL RESECTION OF PROSTATE       Consults:   Consults     No orders found for last 30 day(s).          Condition on Discharge: Stable    Vital Signs  Temp:  [97.4 °F (36.3 °C)-98.1 °F (36.7 °C)] 97.8 °F (36.6 °C)  Heart Rate:  [53-67] 58  Resp:  [15-18] 18  BP: (100-128)/(50-87) 114/62      Discharge Disposition  Home or Self Care    Discharge Medications     Discharge Medications      New Medications      Instructions Start Date   docusate sodium 100 MG capsule  Commonly known as: Colace   100 mg, Oral, 2 Times Daily      HYDROcodone-acetaminophen 5-325 MG per tablet  Commonly known as: NORCO   1 tablet, Oral, Every 6 Hours PRN      ondansetron ODT 4 MG disintegrating tablet  Commonly known as: ZOFRAN-ODT   4 mg, Translingual, Every 6 Hours PRN      oxybutynin 5 MG tablet  Commonly known as: DITROPAN   5 mg, Oral, Every 8 Hours PRN      phenazopyridine 100 MG tablet  Commonly known as: PYRIDIUM   100 mg, Oral, 3 Times Daily PRN      sulfamethoxazole-trimethoprim 800-160 MG per tablet  Commonly known as: BACTRIM DS,SEPTRA DS   1 tablet, Oral, 2 Times Daily         Continue These Medications      Instructions Start Date   albuterol sulfate  (90 Base) MCG/ACT inhaler  Commonly known as: PROVENTIL  HFA;VENTOLIN HFA;PROAIR HFA   2 puffs, Inhalation, Every 6 Hours PRN      amLODIPine 5 MG tablet  Commonly known as: Norvasc   5 mg, Oral, Daily      Aspirin 81 MG capsule   81 mg, Oral, Daily      carboxymethylcellulose 0.5 % solution  Commonly known as: REFRESH PLUS   1 drop, Both Eyes, 4 Times Daily PRN      empagliflozin 10 MG tablet tablet  Commonly known as: JARDIANCE   10 mg, Oral, Daily      Entresto  MG tablet  Generic drug: sacubitril-valsartan   1 tablet, Oral, 2 Times Daily      ezetimibe 10 MG tablet  Commonly known as: ZETIA   10 mg, Oral, Daily      fenofibrate 145 MG tablet  Commonly known as: Tricor   145 mg, Oral, Daily      finasteride 5 MG tablet  Commonly known as: PROSCAR   5 mg, Oral, Daily      fluticasone 50 MCG/ACT nasal spray  Commonly known as: FLONASE   2 sprays, Nasal, Daily PRN      gabapentin 300 MG capsule  Commonly known as: Neurontin   300 mg, Oral, Nightly, Please overnight.      insulin aspart 100 UNIT/ML solution pen-injector sc pen  Commonly known as: novoLOG FLEXPEN   44 Units, Subcutaneous, Every Morning, 40 units 3 times daily, a fourth dose mid day if needed per glucose      insulin detemir 100 UNIT/ML injection  Commonly known as: LEVEMIR   100 Units, Subcutaneous, Daily, 110AM 110 units PM      isosorbide mononitrate 120 MG 24 hr tablet  Commonly known as: IMDUR   120 mg, Oral, Daily      lamoTRIgine 200 MG tablet  Commonly known as: LaMICtal   200 mg, Oral, 2 Times Daily      levETIRAcetam 500 MG tablet  Commonly known as: KEPPRA   Take 3 tablets every morning, take 4 tablets every night.      metFORMIN 1000 MG tablet  Commonly known as: GLUCOPHAGE   1,000 mg, Oral, 2 Times Daily With Meals, HOLD x 48 hours post contrast. May resume 3/18/18      metoprolol tartrate 100 MG tablet  Commonly known as: LOPRESSOR   100 mg, Oral, 2 Times Daily      multivitamin tablet tablet  Commonly known as: THERAGRAN   1 tablet, Oral, Daily      nitroglycerin 0.4 MG SL  tablet  Commonly known as: NITROSTAT   0.4 mg, Sublingual, Every 5 Minutes PRN, Take no more than 3 doses in 15 minutes.       Nurtec 75 MG tablet dispersible tablet  Generic drug: Rimegepant Sulfate   75 mg, Oral, As Needed      pantoprazole 40 MG EC tablet  Commonly known as: PROTONIX   40 mg, Oral, 2 Times Daily      polycarbophil 625 MG tablet tablet   625 mg, Oral, As Needed      psyllium 58.6 % packet  Commonly known as: METAMUCIL   1 packet, Oral, Daily PRN      ranolazine 1000 MG 12 hr tablet  Commonly known as: Ranexa   1,000 mg, Oral, 2 Times Daily      rosuvastatin 20 MG tablet  Commonly known as: CRESTOR   20 mg, Oral, Nightly      spironolactone 25 MG tablet  Commonly known as: ALDACTONE   25 mg, Oral, Daily      tamsulosin 0.4 MG capsule 24 hr capsule  Commonly known as: FLOMAX   0.8 mg, Oral, Nightly         Stop These Medications    apixaban 5 MG tablet tablet  Commonly known as: ELIQUIS            Discharge Diet:   Diet Instructions     Advance Diet as Tolerated            Activity at Discharge:   Activity Instructions     Discharge Activity      1) No driving for 72 hours and until no longer taking narcotics.   2) May shower tonight.   3) Do not lift / push / pull more than 20 pounds for 2 weeks.  4) Wait to restart Eliquis until after your catheters are all removed next week.          Follow-up Appointments  Future Appointments   Date Time Provider Department Center   2/1/2023 10:00 AM Yung Shin APRN [facility]   2/15/2023  8:00 AM PAD CT 2 BH [facility] CAT [facility]   2/15/2023  9:00 AM Steven Tang MD [facility] CTS  [facility]   2/21/2023  8:30 AM Flako Freeman APRN [facility] N [facility]   4/24/2023  1:00 PM Agnieszka Jeff APRN [facility] CD  [facility]   7/26/2023  9:00 AM [facility]AD US [facility] CART 3 BH PAD [facility]   7/26/2023 10:00 AM Arabella Yanes APRN [facility] VS [facility]     Additional Instructions for the Follow-ups that You Need to Schedule     Discharge Follow-up with Specified Provider: Asif Perez,  BEATRICE in the Urology clinic on Monday 1/30 for suprapubic tube removal.   As directed      To: Asif Perez PA-C in the Urology clinic on Monday 1/30 for suprapubic tube removal.               Test Results Pending at Discharge  Pending Labs     Order Current Status    Tissue Pathology Exam In process           Latrell Pope MD  01/24/23  13:30 CST    Time: Discharge 25 min

## 2023-01-25 NOTE — PROGRESS NOTES
Subjective    Mr. Boyer is 62 y.o. male    Chief Complaint: BPH with obstruction/lower urinary tract symptoms/SP cath removal    History of Present Illness  Patient who had a UroLift that was not successful in treating his symptoms he underwent cystoscopy transurethral resection of prostate 01/23/2023 by Dr. Pope.  Due to the difficulty accessing the area that needed surgery Dr. Pope had access from above using a suprapubic approach so suprapubic catheter was placed.  He also has a Drake catheter in his urethra into the bladder.  The suprapubic catheter has been plugged and urine is draining into the bag from has urethral catheter.  He has been off his Eliquis since last Wednesday.    The following portions of the patient's history were reviewed and updated as appropriate: allergies, current medications, past family history, past medical history, past social history, past surgical history and problem list.    Review of Systems   Constitutional: Negative for chills and fever.   Gastrointestinal: Negative.    Genitourinary: Positive for hematuria and penile pain.   All other systems reviewed and are negative.        Current Outpatient Medications:   •  albuterol (PROVENTIL HFA;VENTOLIN HFA) 108 (90 BASE) MCG/ACT inhaler, Inhale 2 puffs Every 6 (Six) Hours As Needed for wheezing., Disp: , Rfl:   •  Aspirin 81 MG capsule, Chew 81 mg Daily., Disp: , Rfl:   •  calcium polycarbophil (FIBERCON) 625 MG tablet, Take 625 mg by mouth As Needed., Disp: , Rfl:   •  carboxymethylcellulose (REFRESH PLUS) 0.5 % solution, Administer 1 drop to both eyes 4 (Four) Times a Day As Needed for Dry Eyes., Disp: , Rfl:   •  Diclofenac Sodium (VOLTAREN) 1 % gel gel, Apply 4 g topically to the appropriate area as directed 4 (Four) Times a Day As Needed., Disp: , Rfl:   •  docusate sodium (Colace) 100 MG capsule, Take 1 capsule by mouth 2 (Two) Times a Day., Disp: 60 capsule, Rfl: 1  •  empagliflozin (JARDIANCE) 10 MG tablet tablet, Take 10  mg by mouth Daily., Disp: , Rfl:   •  ezetimibe (ZETIA) 10 MG tablet, Take 1 tablet by mouth Daily., Disp: , Rfl:   •  finasteride (PROSCAR) 5 MG tablet, Take 1 tablet by mouth Daily., Disp: 30 tablet, Rfl: 3  •  fluticasone (FLONASE) 50 MCG/ACT nasal spray, 2 sprays into the nostril(s) as directed by provider Daily As Needed for Rhinitis or Allergies., Disp: , Rfl:   •  gabapentin (Neurontin) 300 MG capsule, Take 1 capsule by mouth Every Night. Please overnight., Disp: 30 capsule, Rfl: 2  •  guaiFENesin (MUCINEX) 600 MG 12 hr tablet, Take 1,200 mg by mouth As Needed for Congestion., Disp: , Rfl:   •  HYDROcodone-acetaminophen (NORCO) 5-325 MG per tablet, Take 1 tablet by mouth Every 6 (Six) Hours As Needed for Severe Pain (postop pain)., Disp: 10 tablet, Rfl: 0  •  insulin aspart (novoLOG FLEXPEN) 100 UNIT/ML solution pen-injector sc pen, Inject 40 Units under the skin into the appropriate area as directed Every Morning. 40 units 3 times daily, a fourth dose mid day if needed per glucose, Disp: , Rfl:   •  insulin detemir (LEVEMIR) 100 UNIT/ML injection, Inject 100 Units under the skin into the appropriate area as directed Daily. 110AM 110 units PM, Disp: , Rfl:   •  isosorbide mononitrate (IMDUR) 120 MG 24 hr tablet, Take 1 tablet by mouth Daily., Disp: 90 tablet, Rfl: 3  •  lamoTRIgine (LaMICtal) 200 MG tablet, Take 1 tablet by mouth 2 (Two) Times a Day., Disp: 180 tablet, Rfl: 1  •  levETIRAcetam (KEPPRA) 500 MG tablet, Take 3 tablets every morning, take 4 tablets every night., Disp: 630 tablet, Rfl: 1  •  metFORMIN (GLUCOPHAGE) 1000 MG tablet, Take 1 tablet by mouth 2 (Two) Times a Day With Meals. HOLD x 48 hours post contrast. May resume 3/18/18, Disp: , Rfl:   •  metoprolol tartrate (LOPRESSOR) 100 MG tablet, Take 100 mg by mouth 2 (Two) Times a Day., Disp: , Rfl:   •  Multiple Vitamin (MULTI VITAMIN PO), Take 1 tablet by mouth Daily., Disp: , Rfl:   •  nitroglycerin (NITROSTAT) 0.4 MG SL tablet, Place 0.4 mg  under the tongue Every 5 (Five) Minutes As Needed for Chest Pain. Take no more than 3 doses in 15 minutes., Disp: , Rfl:   •  ondansetron ODT (ZOFRAN-ODT) 4 MG disintegrating tablet, Place 1 tablet on the tongue Every 6 (Six) Hours As Needed for Nausea., Disp: 6 tablet, Rfl: 1  •  oxybutynin (DITROPAN) 5 MG tablet, Take 1 tablet by mouth Every 8 (Eight) Hours As Needed (bladder spasms)., Disp: 30 tablet, Rfl: 1  •  pantoprazole (PROTONIX) 40 MG EC tablet, Take 40 mg by mouth 2 (Two) Times a Day., Disp: , Rfl:   •  phenazopyridine (PYRIDIUM) 100 MG tablet, Take 1 tablet by mouth 3 (Three) Times a Day As Needed (urinary burning)., Disp: 20 tablet, Rfl: 1  •  psyllium (METAMUCIL) 58.6 % packet, Take 1 packet by mouth Daily As Needed (constipation)., Disp: , Rfl:   •  Rimegepant Sulfate (Nurtec) 75 MG tablet dispersible tablet, Take 1 tablet by mouth As Needed (Migraine)., Disp: 8 tablet, Rfl: 5  •  rosuvastatin (CRESTOR) 20 MG tablet, Take 20 mg by mouth Every Night., Disp: , Rfl:   •  sacubitril-valsartan (Entresto)  MG tablet, Take 1 tablet by mouth 2 (Two) Times a Day., Disp: 180 tablet, Rfl: 3  •  spironolactone (ALDACTONE) 25 MG tablet, Take 1 tablet by mouth Daily., Disp: 90 tablet, Rfl: 3  •  sulfamethoxazole-trimethoprim (BACTRIM DS,SEPTRA DS) 800-160 MG per tablet, Take 1 tablet by mouth 2 (Two) Times a Day for 10 days., Disp: 20 tablet, Rfl: 0    Past Medical History:   Diagnosis Date   • Arthritis    • Asthma    • Cancer (HCC)    • Carotid disease, bilateral (HCC)    • Chest pain    • Chronic diastolic congestive heart failure (HCC) 09/07/2019   • Chronic sinusitis    • Maribell bullosa    • Coronary artery disease involving native coronary artery of native heart with unstable angina pectoris (HCC) 01/17/2017   • Deviated septum    • Diabetes mellitus (HCC)    • Difficulty urinating    • Diverticulitis    • Enlarged prostate    • Fatty liver    • GERD (gastroesophageal reflux disease)    • Hyperlipidemia  LDL goal <70 02/02/2017   • Hypertrophy of nasal turbinates    • Keratoderma    • Kidney stone    • Migraine    • Murmur, heart    • Myocardial infarction (HCC)    • Obesity    • Paroxysmal atrial fibrillation (HCC) 07/11/2019   • PONV (postoperative nausea and vomiting)    • Primary hypertension 10/16/2016   • Psoriasis    • Seizures (HCC)    • Sinus congestion    • Skin cancer    • Sleep apnea     not using cpap   • SOB (shortness of breath)    • Stroke (HCC)    • UTI (urinary tract infection)        Past Surgical History:   Procedure Laterality Date   • CARDIAC CATHETERIZATION  01/2016    Dr. Broadbent; widely patent previously placed stents in the left anterior descending and obstructive disease involving the diagonal branch which was treated medically   • CARDIAC CATHETERIZATION N/A 07/14/2017    Procedure: Left Heart Cath;  Surgeon: Wade Ramey MD;  Location:  [facility]ATH [facility];  Service:    • CARDIAC CATHETERIZATION Left 10/15/2018    Procedure: Cardiac Catheterization/Vascular Study;  Surgeon: Wade Ramey MD;  Location:  [facility];  Service: Cardiology   • CARDIAC CATHETERIZATION  10/15/2018    Procedure: Functional Flow Hye;  Surgeon: Wade Ramey MD;  Location:  [facility];  Service: Cardiology   • CARDIAC CATHETERIZATION N/A 10/15/2018    Procedure: Left ventriculography;  Surgeon: Wade Ramey MD;  Location:  [facility];  Service: Cardiology   • CARDIAC CATHETERIZATION Left 06/26/2019    Procedure: Cardiac Catheterization/Vascular Study VEL OK  HE WILL WAIT 1 YEAR FOR SHOULDER SURGERY ;  Surgeon: Wade Ramey MD;  Location:  [facility];  Service: Cardiology   • CARDIAC CATHETERIZATION Left 04/30/2021    Procedure: Coronary angiography;  Surgeon: Sahil Llamas MD;  Location:  [facility]ATH [facility];  Service: Cardiology;  Laterality: Left;   • CARDIAC CATHETERIZATION N/A 04/30/2021    Procedure:  Percutaneous Coronary Intervention;  Surgeon: Sahil Llamas MD;  Location: [facility] CATH INVASIVE LOCATION;  Service: Cardiology;  Laterality: N/A;   • CARDIAC CATHETERIZATION N/A 11/09/2022    Procedure: Left Heart Cath with SVGs;  Surgeon: Wade Ramey MD;  Location: [facility] CATH INVASIVE LOCATION;  Service: Cardiology;  Laterality: N/A;   • CHOLECYSTECTOMY     • CHOLECYSTECTOMY WITH INTRAOPERATIVE CHOLANGIOGRAM N/A 08/01/2018    Procedure: CHOLECYSTECTOMY LAPAROSCOPIC INTRAOPERATIVE CHOLANGIOGRAM;  Surgeon: Shane Ann MD;  Location: [facility] OR;  Service: General   • COLONOSCOPY N/A 07/14/2020    Procedure: COLONOSCOPY WITH ANESTHESIA;  Surgeon: Anupam Morales DO;  Location: [facility] ENDOSCOPY;  Service: Gastroenterology;  Laterality: N/A;  pre: abdominal pain  post: diverticulosis  Vinayak Correia PA   • CORONARY ANGIOPLASTY     • CORONARY ARTERY BYPASS GRAFT N/A 07/06/2019    Procedure: CABG X2 WITH LIMA, LEFT LEG OVH, AND PLACEMENT OF LEFT FEMORAL ARTERIAL LINE;  Surgeon: Steven Tang MD;  Location: [facility] OR;  Service: Cardiothoracic   • CORONARY STENT PLACEMENT      x 6   • CYSTOSCOPY TRANSURETHRAL RESECTION OF PROSTATE N/A 1/23/2023    Procedure: CYSTOSCOPY TRANSURETHRAL RESECTION OF PROSTATE;  Surgeon: Latrell Pope MD;  Location: [facility] OR;  Service: Urology;  Laterality: N/A;   • ENDOSCOPIC FUNCTIONAL SINUS SURGERY (FESS) Bilateral 12/13/2017    Procedure: PROCEDURE PERFORMED:  Bilateral functional endoscopic anterior ethmoidectomy with bilateral middle meatal antrostomy Septoplasty Right kathia bullosa resection Bilateral inferior turbinate reduction via Coblation;  Surgeon: Mayank Ibarra MD;  Location: [facility] OR;  Service:    • ENDOSCOPY N/A 07/30/2018    Procedure: ESOPHAGOGASTRODUODENOSCOPY WITH ANESTHESIA;  Surgeon: Benitez Mas MD;  Location: [facility] ENDOSCOPY;  Service: Gastroenterology   • ENDOSCOPY N/A 07/14/2020    Procedure: ESOPHAGOGASTRODUODENOSCOPY WITH  "ANESTHESIA;  Surgeon: Anupam Morales DO;  Location:  [facility] ENDOSCOPY;  Service: Gastroenterology;  Laterality: N/A;  pre: abdominal pain  post: esophagitis  Vinayak Correia PA   • HERNIA REPAIR      x2 inguinal area   • KIDNEY STONE SURGERY     • KNEE ARTHROSCOPY Right 2022    Procedure: RIGHT KNEE PARTIAL LATERAL MENISCECTOMY;  Surgeon: Pedro Pablo Song MD;  Location:  [facility] OR;  Service: Orthopedics;  Laterality: Right;   • KNEE SURGERY Right    • OTHER SURGICAL HISTORY      urolift   • PROSTATE SURGERY      Dr. Badillo -    • ROTATOR CUFF REPAIR Right    • THUMB AMPUTATION Left     partial   • TOE AMPUTATION Right     big       Social History     Socioeconomic History   • Marital status:    Tobacco Use   • Smoking status: Former     Packs/day: 1.50     Years: 4.50     Pack years: 6.75     Types: Cigarettes, Cigars     Quit date:      Years since quittin.0   • Smokeless tobacco: Former     Types: Chew     Quit date:    Vaping Use   • Vaping Use: Never used   Substance and Sexual Activity   • Alcohol use: No   • Drug use: No   • Sexual activity: Defer       Family History   Problem Relation Age of Onset   • Heart disease Father    • COPD Mother    • Hypertension Mother    • Asthma Mother    • No Known Problems Sister    • Colon cancer Paternal Uncle    • Prostate cancer Maternal Grandfather    • No Known Problems Sister    • Colon cancer Maternal Grandmother    • Colon polyps Maternal Grandmother        Objective    Temp 97.7 °F (36.5 °C)   Ht 182.9 cm (72\")   Wt 117 kg (258 lb 12.8 oz)   BMI 35.10 kg/m²     Physical Exam  Vitals reviewed.   Constitutional:       General: He is not in acute distress.     Appearance: Normal appearance.   HENT:      Head: Normocephalic and atraumatic.   Pulmonary:      Effort: Pulmonary effort is normal.   Abdominal:      Tenderness: There is no abdominal tenderness.      Comments: There is a suprapubic tube inserted within the mid " suprapubic area no discharge pus or erythema is noted at the insertion site.   Genitourinary:     Comments: There is a Drake catheter inserted within the urethral meatus it is draining yellow clear urine at this time.  Neurological:      Mental Status: He is alert and oriented to person, place, and time.   Psychiatric:         Mood and Affect: Mood normal.         Behavior: Behavior normal.               Assessment and Plan    Diagnoses and all orders for this visit:    1. BPH with obstruction/lower urinary tract symptoms (Primary)    Patient here for suprapubic catheter removal he also has a Drake catheter within the urethra this is draining urine and appears to be working appropriately.  The suprapubic catheter tube has been plugged I remove the anchor sutures and then my medical assistant remove the suprapubic catheter.  Gauze was applied to the insertion site.  Like to leave the Drake catheter in place for 7 days and remove here in the office as a nurse visit.  Next few days patient needs to resume his Eliquis and they should watch for any worsening or increased bleeding if they see some pink-tinged or light red-colored urine that is not of a concern.    He will follow-up in 6 weeks to see Dr. Pope for postoperative follow-up.

## 2023-01-26 ENCOUNTER — TELEPHONE (OUTPATIENT)
Dept: UROLOGY | Facility: CLINIC | Age: 63
End: 2023-01-26
Payer: OTHER GOVERNMENT

## 2023-01-26 NOTE — TELEPHONE ENCOUNTER
Clinical Issues/concern/symptoms: Patient's wife called with concern for her . He had surgery with Dr. Pope on 1/23. Patient is hurting in his lower back like when he has a kidney stone. His urine is the color of a reddish brown. Patient has taken both pain medications and medication for bladder spasms. His pain is about a 5. They called the ER last night and they were told to contact us this morning.    Last appointment-with whom: Surgery with Dr. Pope 1/23/23    Last appointment note: Follow up with Asif on 1/30 for suprapubic tube removal    How long has issue/concern been going on: It started last night around 7 pm.    Is it getting worse: Yes progressively     Do you have any fever/nausea/vomiting: No to all three    Pt able to urinate: Yes patient has catheter    Pharmacy: [facility] at [facility]

## 2023-01-26 NOTE — TELEPHONE ENCOUNTER
Called an spoke with the pt and let him know that from the surgery he could have that slight pain and blood in the urine that was typical.  Let him know that if he felt the pain was bad enough we could try to get him appt with the PA tomorrow.  Pt stated that he would call back if decided that he wanted that appt.

## 2023-01-28 LAB
LAB AP CASE REPORT: NORMAL
Lab: NORMAL
PATH REPORT.FINAL DX SPEC: NORMAL
PATH REPORT.GROSS SPEC: NORMAL

## 2023-01-29 ENCOUNTER — HOSPITAL ENCOUNTER (EMERGENCY)
Facility: HOSPITAL | Age: 63
Discharge: HOME | End: 2023-01-29
Attending: STUDENT IN AN ORGANIZED HEALTH CARE EDUCATION/TRAINING PROGRAM | Admitting: STUDENT IN AN ORGANIZED HEALTH CARE EDUCATION/TRAINING PROGRAM
Payer: OTHER GOVERNMENT

## 2023-01-29 ENCOUNTER — NURSE TRIAGE (OUTPATIENT)
Dept: CALL CENTER | Facility: HOSPITAL | Age: 63
End: 2023-01-29
Payer: OTHER GOVERNMENT

## 2023-01-29 VITALS
TEMPERATURE: 97.7 F | OXYGEN SATURATION: 96 % | HEIGHT: 71 IN | HEART RATE: 63 BPM | BODY MASS INDEX: 36.4 KG/M2 | SYSTOLIC BLOOD PRESSURE: 131 MMHG | DIASTOLIC BLOOD PRESSURE: 71 MMHG | RESPIRATION RATE: 16 BRPM | WEIGHT: 260 LBS

## 2023-01-29 DIAGNOSIS — T83.9XXA PROBLEM WITH FOLEY CATHETER, INITIAL ENCOUNTER: Primary | ICD-10-CM

## 2023-01-29 LAB
BACTERIA UR QL AUTO: ABNORMAL /HPF
BILIRUB UR QL STRIP: NEGATIVE
CLARITY UR: CLEAR
COLOR UR: ABNORMAL
GLUCOSE UR STRIP-MCNC: ABNORMAL MG/DL
HGB UR QL STRIP.AUTO: ABNORMAL
HYALINE CASTS UR QL AUTO: ABNORMAL /LPF
KETONES UR QL STRIP: NEGATIVE
LEUKOCYTE ESTERASE UR QL STRIP.AUTO: NEGATIVE
NITRITE UR QL STRIP: POSITIVE
PH UR STRIP.AUTO: 6 [PH] (ref 5–8)
PROT UR QL STRIP: ABNORMAL
RBC # UR STRIP: ABNORMAL /HPF
REF LAB TEST METHOD: ABNORMAL
SP GR UR STRIP: >1.03 (ref 1–1.03)
SQUAMOUS #/AREA URNS HPF: ABNORMAL /HPF
UROBILINOGEN UR QL STRIP: ABNORMAL
WBC # UR STRIP: ABNORMAL /HPF

## 2023-01-29 PROCEDURE — 99283 EMERGENCY DEPT VISIT LOW MDM: CPT

## 2023-01-29 PROCEDURE — 81001 URINALYSIS AUTO W/SCOPE: CPT | Performed by: STUDENT IN AN ORGANIZED HEALTH CARE EDUCATION/TRAINING PROGRAM

## 2023-01-29 PROCEDURE — P9612 CATHETERIZE FOR URINE SPEC: HCPCS

## 2023-01-29 PROCEDURE — 87086 URINE CULTURE/COLONY COUNT: CPT | Performed by: STUDENT IN AN ORGANIZED HEALTH CARE EDUCATION/TRAINING PROGRAM

## 2023-01-29 NOTE — TELEPHONE ENCOUNTER
" He was discharged from [facility] on 01/24/23- He had a cystoscopy with a TURP- He has a delong and a supra pubic tube( it is capped off)  - it will be removed-on 01/30/23- The delong is leaking urine.  Explained to go ER. The called did not receive a call back.     Reason for Disposition  • [1] Catheter is broken AND [2] is not usable    Additional Information  • Negative: Shock suspected (e.g., cold/pale/clammy skin, too weak to stand, low BP, rapid pulse)  • Negative: Sounds like a life-threatening emergency to the triager  • Negative: [1] Catheter was accidentally pulled-out AND [2] bright red continuous bleeding  • Negative: SEVERE abdominal pain  • Negative: Fever > 100.4 F (38.0 C)  • Negative: [1] Drinking very little AND [2] dehydration suspected (e.g., no urine > 12 hours, very dry mouth, very lightheaded)  • Negative: Patient sounds very sick or weak to the triager  • Negative: Catheter was accidentally pulled-out  • Negative: Bleeding around catheter (e.g., from penis or female urethra)  • Negative: Lower abdominal pain or distention  • Negative: [1] No urine in bag > 4 hours AND [2] catheter is not kinked    Answer Assessment - Initial Assessment Questions  1. SYMPTOMS: \"What symptoms are you concerned about?\"      The delong bag is leaking at the opening as it is inserted in the penis. Recent TURP has supra pubic tube capped off   2. ONSET:  \"When did the symptoms start?\"      Last night   3. FEVER: \"Is there a fever?\" If Yes, ask: \"What is the temperature, how was it measured, and when did it start?\"      no  4. ABDOMINAL PAIN: \"Is there any abdominal pain?\" (e.g., Scale 1-10; or mild, moderate, severe)      no  5. URINE COLOR: \"What color is the urine?\"  \"Is there blood present in the urine?\" (e.g., clear, yellow, cloudy, tea-colored, blood streaks, bright red)      Pale yellow with flecks   6. ONSET: \"When was the catheter inserted?\"      01/24/23  7. OTHER SYMPTOMS: \"Do you have any other symptoms?\" " "(e.g., back pain, bad urine odor)       no  8. PREGNANCY: \"Is there any chance you are pregnant?\" \"When was your last menstrual period?\"      no    Protocols used: URINARY CATHETER SYMPTOMS AND QUESTIONS-ADULT-      "

## 2023-01-30 ENCOUNTER — OFFICE VISIT (OUTPATIENT)
Dept: UROLOGY | Facility: CLINIC | Age: 63
End: 2023-01-30
Payer: OTHER GOVERNMENT

## 2023-01-30 VITALS — HEIGHT: 72 IN | BODY MASS INDEX: 35.05 KG/M2 | WEIGHT: 258.8 LBS | TEMPERATURE: 97.7 F

## 2023-01-30 DIAGNOSIS — N40.1 BPH WITH OBSTRUCTION/LOWER URINARY TRACT SYMPTOMS: Primary | ICD-10-CM

## 2023-01-30 DIAGNOSIS — N13.8 BPH WITH OBSTRUCTION/LOWER URINARY TRACT SYMPTOMS: Primary | ICD-10-CM

## 2023-01-30 LAB — BACTERIA SPEC AEROBE CULT: NO GROWTH

## 2023-01-30 PROCEDURE — 99024 POSTOP FOLLOW-UP VISIT: CPT | Performed by: PHYSICIAN ASSISTANT

## 2023-02-01 ENCOUNTER — OFFICE VISIT (OUTPATIENT)
Dept: OTOLARYNGOLOGY | Facility: CLINIC | Age: 63
End: 2023-02-01
Payer: MEDICARE

## 2023-02-01 VITALS
OXYGEN SATURATION: 95 % | WEIGHT: 258 LBS | RESPIRATION RATE: 20 BRPM | BODY MASS INDEX: 34.95 KG/M2 | HEART RATE: 70 BPM | TEMPERATURE: 98.1 F | HEIGHT: 72 IN

## 2023-02-01 DIAGNOSIS — R06.83 SNORING: ICD-10-CM

## 2023-02-01 DIAGNOSIS — R40.0 DAYTIME SOMNOLENCE: ICD-10-CM

## 2023-02-01 DIAGNOSIS — G47.30 SLEEP APNEA, UNSPECIFIED TYPE: Primary | ICD-10-CM

## 2023-02-01 PROCEDURE — 99214 OFFICE O/P EST MOD 30 MIN: CPT | Performed by: NURSE PRACTITIONER

## 2023-02-01 NOTE — PATIENT INSTRUCTIONS
Discussed videosleep endoscopy with patient  Will repeat sleep study    CONTACT INFORMATION:  The main office phone number is [phone]. For emergencies after hours and on weekends, this number will convert over to our answering service and the on call provider will answer. Please try to keep non emergent phone calls/ questions to office hours 9am-5pm Monday through Friday.      [employer]  As an alternative, you can sign up and use the Epic MyChart system for more direct and quicker access for non emergent questions/ problems.  [employer] allows you to send messages to your doctor, view your test results, renew your prescriptions, schedule appointments, and more. To sign up, go to [employer] and click on the Sign Up Now link in the New User? box. Enter your [employer] Activation Code exactly as it appears below along with the last four digits of your Social Security Number and your Date of Birth () to complete the sign-up process. If you do not sign up before the expiration date, you must request a new code.     [employer] Activation Code: Activation code not generated  Current [employer] Status: Active     If you have questions, you can email [email or web address] or call [phone] to talk to our [employer] staff. Remember, [employer] is NOT to be used for urgent needs. For medical emergencies, dial 911.     IF YOU SMOKE OR USE TOBACCO PLEASE READ THE FOLLOWING:  Why is smoking bad for me?  Smoking increases the risk of heart disease, lung disease, vascular disease, stroke, and cancer. If you smoke, STOP!        IF YOU SMOKE OR USE TOBACCO PLEASE READ THE FOLLOWING:  Why is smoking bad for me?  Smoking increases the risk of heart disease, lung disease, vascular disease, stroke, and cancer. If you smoke, STOP!     For more information:  Quit Now [place]  -QUIT-NOW  https://[email or web address]/en-[email or web address]/

## 2023-02-01 NOTE — PROGRESS NOTES
YOB: 1960  Location: [facility]  Location Address: [address] [place] [address], [facility], [address] [place] [address]  Location Phone: [phone]    Chief Complaint   Patient presents with   • Sleep Apnea     Obstructive         History of Present Illness  Carlos A Boyer Jr. is a 62 y.o. male.  Carlos A Boyer Jr. is here for evaluation of ENT complaints. The patient has had problems with sleep apnea, snoring, and fatigue. The symptoms are not localized to a particular location. The patient has had mild to moderate symptoms. The symptoms have been present for the last several years. There have been no identified factors that aggravate the symptoms. There have been no factors that have improved the symptoms.    He states that he is unable to use a CPAP due to the mask intolerance. He is interested in the inspire today. He has not had a recent sleep study.    [place]: 13    Past Medical History:   Diagnosis Date   • Arthritis    • Asthma    • Cancer ([facility])    • Carotid disease, bilateral ([facility])    • Chest pain    • Chronic diastolic congestive heart failure ([facility]) 2019   • Chronic sinusitis    • Maribell bullosa    • Coronary artery disease involving native coronary artery of native heart with unstable angina pectoris ([facility]) 2017   • Deviated septum    • Diabetes mellitus ([facility])    • Difficulty urinating    • Diverticulitis    • Enlarged prostate    • Fatty liver    • GERD (gastroesophageal reflux disease)    • Hyperlipidemia LDL goal <70 2017   • Hypertrophy of nasal turbinates    • Keratoderma    • Kidney stone    • Migraine    • Murmur, heart    • Myocardial infarction ([facility])    • Obesity    • Paroxysmal atrial fibrillation ([facility]) 2019   • PONV (postoperative nausea and vomiting)    • Primary hypertension 10/16/2016   • Psoriasis    • Seizures ([facility])    • Sinus congestion    • Skin cancer    • Sleep apnea     not using cpap   • SOB (shortness of breath)    • Stroke ([facility])    • UTI (urinary tract  infection)        Past Surgical History:   Procedure Laterality Date   • CARDIAC CATHETERIZATION  01/2016    Dr. Broadbent; widely patent previously placed stents in the left anterior descending and obstructive disease involving the diagonal branch which was treated medically   • CARDIAC CATHETERIZATION N/A 07/14/2017    Procedure: Left Heart Cath;  Surgeon: Wade Ramey MD;  Location:  [facility];  Service:    • CARDIAC CATHETERIZATION Left 10/15/2018    Procedure: Cardiac Catheterization/Vascular Study;  Surgeon: Wade Ramey MD;  Location:  [facility];  Service: Cardiology   • CARDIAC CATHETERIZATION  10/15/2018    Procedure: Functional Flow [place];  Surgeon: Wade Ramey MD;  Location:  [facility];  Service: Cardiology   • CARDIAC CATHETERIZATION N/A 10/15/2018    Procedure: Left ventriculography;  Surgeon: Wade Ramey MD;  Location:  [facility];  Service: Cardiology   • CARDIAC CATHETERIZATION Left 06/26/2019    Procedure: Cardiac Catheterization/Vascular Study VEL OK  HE WILL WAIT 1 YEAR FOR SHOULDER SURGERY ;  Surgeon: Wade Ramey MD;  Location:  [facility];  Service: Cardiology   • CARDIAC CATHETERIZATION Left 04/30/2021    Procedure: Coronary angiography;  Surgeon: Sahil Llamas MD;  Location:  [facility];  Service: Cardiology;  Laterality: Left;   • CARDIAC CATHETERIZATION N/A 04/30/2021    Procedure: Percutaneous Coronary Intervention;  Surgeon: Sahil Llamas MD;  Location:  [facility];  Service: Cardiology;  Laterality: N/A;   • CARDIAC CATHETERIZATION N/A 11/09/2022    Procedure: Left Heart Cath with SVGs;  Surgeon: Wade Ramey MD;  Location:  [facility];  Service: Cardiology;  Laterality: N/A;   • CHOLECYSTECTOMY     • CHOLECYSTECTOMY WITH INTRAOPERATIVE CHOLANGIOGRAM N/A 08/01/2018    Procedure: CHOLECYSTECTOMY LAPAROSCOPIC INTRAOPERATIVE  CHOLANGIOGRAM;  Surgeon: Shane Ann MD;  Location: [facility] OR;  Service: General   • COLONOSCOPY N/A 07/14/2020    Procedure: COLONOSCOPY WITH ANESTHESIA;  Surgeon: Anupam Morales DO;  Location: [facility] ENDOSCOPY;  Service: Gastroenterology;  Laterality: N/A;  pre: abdominal pain  post: diverticulosis  Vinayak Correia PA   • CORONARY ANGIOPLASTY     • CORONARY ARTERY BYPASS GRAFT N/A 07/06/2019    Procedure: CABG X2 WITH LIMA, LEFT LEG OVH, AND PLACEMENT OF LEFT FEMORAL ARTERIAL LINE;  Surgeon: Steven Tang MD;  Location: [facility] OR;  Service: Cardiothoracic   • CORONARY STENT PLACEMENT      x 6   • CYSTOSCOPY TRANSURETHRAL RESECTION OF PROSTATE N/A 1/23/2023    Procedure: CYSTOSCOPY TRANSURETHRAL RESECTION OF PROSTATE;  Surgeon: Latrell Pope MD;  Location: [facility] OR;  Service: Urology;  Laterality: N/A;   • ENDOSCOPIC FUNCTIONAL SINUS SURGERY (FESS) Bilateral 12/13/2017    Procedure: PROCEDURE PERFORMED:  Bilateral functional endoscopic anterior ethmoidectomy with bilateral middle meatal antrostomy Septoplasty Right kathia bullosa resection Bilateral inferior turbinate reduction via Coblation;  Surgeon: Mayank Ibarra MD;  Location: [facility] OR;  Service:    • ENDOSCOPY N/A 07/30/2018    Procedure: ESOPHAGOGASTRODUODENOSCOPY WITH ANESTHESIA;  Surgeon: Benitez Mas MD;  Location: [facility] ENDOSCOPY;  Service: Gastroenterology   • ENDOSCOPY N/A 07/14/2020    Procedure: ESOPHAGOGASTRODUODENOSCOPY WITH ANESTHESIA;  Surgeon: Anupam Morales DO;  Location: [facility] ENDOSCOPY;  Service: Gastroenterology;  Laterality: N/A;  pre: abdominal pain  post: esophagitis  Vinayak Correia PA   • HERNIA REPAIR      x2 inguinal area   • KIDNEY STONE SURGERY     • KNEE ARTHROSCOPY Right 03/01/2022    Procedure: RIGHT KNEE PARTIAL LATERAL MENISCECTOMY;  Surgeon: Pedro Pablo Song MD;  Location: [facility] OR;  Service: Orthopedics;  Laterality: Right;   • KNEE SURGERY Right    • OTHER SURGICAL  HISTORY      urolift   • PROSTATE SURGERY      Dr. Badillo - 2017   • ROTATOR CUFF REPAIR Right    • THUMB AMPUTATION Left     partial   • TOE AMPUTATION Right     big       Outpatient Medications Marked as Taking for the 2/1/23 encounter (Office Visit) with Yung Shin APRN   Medication Sig Dispense Refill   • albuterol (PROVENTIL HFA;VENTOLIN HFA) 108 (90 BASE) MCG/ACT inhaler Inhale 2 puffs Every 6 (Six) Hours As Needed for wheezing.     • Aspirin 81 MG capsule Chew 81 mg Daily.     • calcium polycarbophil (FIBERCON) 625 MG tablet Take 625 mg by mouth As Needed.     • carboxymethylcellulose (REFRESH PLUS) 0.5 % solution Administer 1 drop to both eyes 4 (Four) Times a Day As Needed for Dry Eyes.     • Diclofenac Sodium (VOLTAREN) 1 % gel gel Apply 4 g topically to the appropriate area as directed 4 (Four) Times a Day As Needed.     • docusate sodium (Colace) 100 MG capsule Take 1 capsule by mouth 2 (Two) Times a Day. 60 capsule 1   • empagliflozin (JARDIANCE) 10 MG tablet tablet Take 10 mg by mouth Daily.     • ezetimibe (ZETIA) 10 MG tablet Take 1 tablet by mouth Daily.     • finasteride (PROSCAR) 5 MG tablet Take 1 tablet by mouth Daily. 30 tablet 3   • fluticasone (FLONASE) 50 MCG/ACT nasal spray 2 sprays into the nostril(s) as directed by provider Daily As Needed for Rhinitis or Allergies.     • gabapentin (Neurontin) 300 MG capsule Take 1 capsule by mouth Every Night. Please overnight. 30 capsule 2   • guaiFENesin (MUCINEX) 600 MG 12 hr tablet Take 1,200 mg by mouth As Needed for Congestion.     • HYDROcodone-acetaminophen (NORCO) 5-325 MG per tablet Take 1 tablet by mouth Every 6 (Six) Hours As Needed for Severe Pain (postop pain). 10 tablet 0   • insulin aspart (novoLOG FLEXPEN) 100 UNIT/ML solution pen-injector sc pen Inject 40 Units under the skin into the appropriate area as directed Every Morning. 40 units 3 times daily, a fourth dose mid day if needed per glucose     • insulin detemir (LEVEMIR) 100  UNIT/ML injection Inject 100 Units under the skin into the appropriate area as directed Daily. 110AM 110 units PM     • isosorbide mononitrate (IMDUR) 120 MG 24 hr tablet Take 1 tablet by mouth Daily. 90 tablet 3   • lamoTRIgine (LaMICtal) 200 MG tablet Take 1 tablet by mouth 2 (Two) Times a Day. 180 tablet 1   • levETIRAcetam (KEPPRA) 500 MG tablet Take 3 tablets every morning, take 4 tablets every night. 630 tablet 1   • metFORMIN (GLUCOPHAGE) 1000 MG tablet Take 1 tablet by mouth 2 (Two) Times a Day With Meals. HOLD x 48 hours post contrast. May resume 3/18/18     • metoprolol tartrate (LOPRESSOR) 100 MG tablet Take 100 mg by mouth 2 (Two) Times a Day.     • Multiple Vitamin (MULTI VITAMIN PO) Take 1 tablet by mouth Daily.     • nitroglycerin (NITROSTAT) 0.4 MG SL tablet Place 0.4 mg under the tongue Every 5 (Five) Minutes As Needed for Chest Pain. Take no more than 3 doses in 15 minutes.     • ondansetron ODT (ZOFRAN-ODT) 4 MG disintegrating tablet Place 1 tablet on the tongue Every 6 (Six) Hours As Needed for Nausea. 6 tablet 1   • oxybutynin (DITROPAN) 5 MG tablet Take 1 tablet by mouth Every 8 (Eight) Hours As Needed (bladder spasms). 30 tablet 1   • pantoprazole (PROTONIX) 40 MG EC tablet Take 40 mg by mouth 2 (Two) Times a Day.     • phenazopyridine (PYRIDIUM) 100 MG tablet Take 1 tablet by mouth 3 (Three) Times a Day As Needed (urinary burning). 20 tablet 1   • psyllium (METAMUCIL) 58.6 % packet Take 1 packet by mouth Daily As Needed (constipation).     • Rimegepant Sulfate (Nurtec) 75 MG tablet dispersible tablet Take 1 tablet by mouth As Needed (Migraine). 8 tablet 5   • rosuvastatin (CRESTOR) 20 MG tablet Take 20 mg by mouth Every Night.     • sacubitril-valsartan (Entresto)  MG tablet Take 1 tablet by mouth 2 (Two) Times a Day. 180 tablet 3   • spironolactone (ALDACTONE) 25 MG tablet Take 1 tablet by mouth Daily. 90 tablet 3       Flagyl [metronidazole], Atorvastatin, and Ciprofloxacin    Family  History   Problem Relation Age of Onset   • Heart disease Father    • COPD Mother    • Hypertension Mother    • Asthma Mother    • No Known Problems Sister    • Colon cancer Paternal Uncle    • Prostate cancer Maternal Grandfather    • No Known Problems Sister    • Colon cancer Maternal Grandmother    • Colon polyps Maternal Grandmother        Social History     Socioeconomic History   • Marital status:    Tobacco Use   • Smoking status: Former     Packs/day: 1.50     Years: 4.50     Pack years: 6.75     Types: Cigarettes, Cigars     Quit date:      Years since quittin.1   • Smokeless tobacco: Former     Types: Chew     Quit date:    Vaping Use   • Vaping Use: Never used   Substance and Sexual Activity   • Alcohol use: No   • Drug use: No   • Sexual activity: Defer       Review of Systems   Constitutional: Positive for fatigue.   HENT: Negative.    Respiratory: Positive for apnea.         Admits snoring   Genitourinary: Negative.    Neurological: Negative.        Vitals:    23 1446   Pulse: 70   Resp: 20   Temp: 98.1 °F (36.7 °C)   SpO2: 95%       Body mass index is 34.99 kg/m².    Objective     Physical Exam  Vitals reviewed.   Constitutional:       Appearance: Normal appearance. He is obese.   HENT:      Head: Normocephalic and atraumatic.      Right Ear: Hearing, tympanic membrane, ear canal and external ear normal.      Left Ear: Hearing, tympanic membrane, ear canal and external ear normal.      Nose: Nose normal.      Mouth/Throat:      Lips: Pink.      Mouth: Mucous membranes are moist.      Dentition: Abnormal dentition.      Comments: Davis II  Musculoskeletal:      Cervical back: Full passive range of motion without pain.   Neurological:      Mental Status: He is alert.   Psychiatric:         Behavior: Behavior is cooperative.         Assessment & Plan   Diagnoses and all orders for this visit:    1. Sleep apnea, unspecified type (Primary)  -     Polysomnography 4 or More  Parameters; Future  -     Case Request; Standing  -     Basic Metabolic Panel; Future  -     CBC (No Diff); Future  -     ECG 12 Lead; Future  -     XR Chest 1 View; Future  -     Case Request    2. Daytime somnolence  -     Polysomnography 4 or More Parameters; Future  -     Case Request; Standing  -     Basic Metabolic Panel; Future  -     CBC (No Diff); Future  -     ECG 12 Lead; Future  -     XR Chest 1 View; Future  -     Case Request    3. Snoring  -     Polysomnography 4 or More Parameters; Future  -     Case Request; Standing  -     Basic Metabolic Panel; Future  -     CBC (No Diff); Future  -     ECG 12 Lead; Future  -     XR Chest 1 View; Future  -     Case Request    Other orders  -     Follow Anesthesia Guidelines / Protocol; Future  -     Obtain Informed Consent  -     Follow Anesthesia Guidelines / Protocol; Standing      Videosleep endoscopy (N/A)  Orders Placed This Encounter   Procedures   • XR Chest 1 View     Standing Status:   Future     Standing Expiration Date:   2/1/2024     Order Specific Question:   Reason for Exam:     Answer:   preoperative   • Basic Metabolic Panel     Standing Status:   Future     Standing Expiration Date:   2/1/2024     Order Specific Question:   Release to patient     Answer:   Routine Release   • CBC (No Diff)     Standing Status:   Future     Standing Expiration Date:   2/1/2024     Order Specific Question:   Release to patient     Answer:   Routine Release   • Obtain Informed Consent     Order Specific Question:   Informed Consent Given For     Answer:   VIDEOSLEEP ENDOSCOPY   • ECG 12 Lead     Standing Status:   Future     Standing Expiration Date:   2/1/2024     Order Specific Question:   Reason for Exam:     Answer:   preoperative   • Polysomnography 4 or More Parameters     Standing Status:   Future     Standing Expiration Date:   2/1/2024     Order Specific Question:   May take own meds     Answer:   Yes     Order Specific Question:   Details     Answer:   O2  Implementation per Protocol     Order Specific Question:   Release to patient     Answer:   Routine Release     Return for post op.       Patient Instructions   Discussed videosleep endoscopy with patient  Will repeat sleep study    CONTACT INFORMATION:  The main office phone number is [phone]. For emergencies after hours and on weekends, this number will convert over to our answering service and the on call provider will answer. Please try to keep non emergent phone calls/ questions to office hours 9am-5pm Monday through Friday.      [employer]  As an alternative, you can sign up and use the Epic MyChart system for more direct and quicker access for non emergent questions/ problems.  [facility] [employer] allows you to send messages to your doctor, view your test results, renew your prescriptions, schedule appointments, and more. To sign up, go to [facility] and click on the Sign Up Now link in the New User? box. Enter your [employer] Activation Code exactly as it appears below along with the last four digits of your Social Security Number and your Date of Birth () to complete the sign-up process. If you do not sign up before the expiration date, you must request a new code.     [employer] Activation Code: Activation code not generated  Current [employer] Status: Active     If you have questions, you can email [email or web address] or call [phone] to talk to our [employer] staff. Remember, [employer] is NOT to be used for urgent needs. For medical emergencies, dial 911.     IF YOU SMOKE OR USE TOBACCO PLEASE READ THE FOLLOWING:  Why is smoking bad for me?  Smoking increases the risk of heart disease, lung disease, vascular disease, stroke, and cancer. If you smoke, STOP!        IF YOU SMOKE OR USE TOBACCO PLEASE READ THE FOLLOWING:  Why is smoking bad for me?  Smoking increases the risk of heart disease, lung disease, vascular disease, stroke, and cancer. If you smoke, STOP!     For more  information:  Quit Now [place]  1-800-QUIT-NOW  https://[place].quitlogix.org/en-US/

## 2023-02-02 PROBLEM — R40.0 DAYTIME SOMNOLENCE: Status: ACTIVE | Noted: 2023-02-02

## 2023-02-02 PROBLEM — R06.83 SNORING: Status: ACTIVE | Noted: 2023-02-02

## 2023-02-06 ENCOUNTER — PROCEDURE VISIT (OUTPATIENT)
Dept: UROLOGY | Facility: CLINIC | Age: 63
End: 2023-02-06
Payer: OTHER GOVERNMENT

## 2023-02-06 DIAGNOSIS — N13.8 BPH WITH OBSTRUCTION/LOWER URINARY TRACT SYMPTOMS: Primary | ICD-10-CM

## 2023-02-06 DIAGNOSIS — N40.1 BPH WITH OBSTRUCTION/LOWER URINARY TRACT SYMPTOMS: Primary | ICD-10-CM

## 2023-02-06 PROCEDURE — 99024 POSTOP FOLLOW-UP VISIT: CPT | Performed by: PHYSICIAN ASSISTANT

## 2023-02-06 NOTE — PROGRESS NOTES
Carlos A Boyer JrGloria is a 62 y.o. male who is here today for catheter removal. Patient of Asif Perez PA-C. 10cc syringe used to deflate the balloon and the catheter was removed without difficulty.  Asif Perez PA-C was in the office at the time of procedure.     Patient was advised to drink clear fluids for the next couple hours and urinate. The patient was also advised he may experience some blood in the urine and burning with urination for the next couple days. If the patient is unable to urinate or develops fever, chills, N&V or suprapubic pain he will call to return for an appt at clinic or seek medical treatment at [facility] ER, PCP or Urgent Care after hours. Patient verbalized understanding and all questions were answered.     I have reviewed and agree with medical assistance documentation above  Liz NG MA

## 2023-02-08 ENCOUNTER — TELEPHONE (OUTPATIENT)
Dept: OTOLARYNGOLOGY | Facility: CLINIC | Age: 63
End: 2023-02-08
Payer: OTHER GOVERNMENT

## 2023-02-08 ENCOUNTER — TELEPHONE (OUTPATIENT)
Dept: OTOLARYNGOLOGY | Facility: CLINIC | Age: 63
End: 2023-02-08

## 2023-02-08 NOTE — TELEPHONE ENCOUNTER
UNABLE TO WARM TRANSFER TO OFFICE   Caller: Carlos A Boyer Jr.    Relationship: Self    Best call back number: [phone]    What is the best time to reach you: ANYTIME    Who are you requesting to speak with (clinical staff, provider,  specific staff member): FLORENCIO    What was the call regarding: RECEIVED A CALL FROM PT RETURNING A MISSED PHONE CALL FROM FLORENCIO. OFFICE PLEASE ADVISE.THANK YOU.

## 2023-02-13 ENCOUNTER — TELEPHONE (OUTPATIENT)
Dept: UROLOGY | Facility: CLINIC | Age: 63
End: 2023-02-13
Payer: OTHER GOVERNMENT

## 2023-02-13 NOTE — PROGRESS NOTES
Subjective    Mr. Boyer is 62 y.o. male    Chief Complaint: Gross Hematuria     History of Present Illness    62-year-old male established patient and with new complaint of gross hematuria that started yesterday accompanied with small clots.  Patient is s/p TURP by Dr. Pope 1/23/2023.  Patient had Drake catheter removal 1 week ago yesterday.  Patient started back on blood thinner medication approximately 4 days ago.  During TURP procedure there was difficulty accessing the area ultimately leading to requiring suprapubic catheter placement as well as indwelling Drake catheter in the urethra.  Both catheters have since been removed.    At present patient reports that as of this morning the urine has gone back to being clear has had noted no difficulty with urination.  Denies any fever or chills.    The following portions of the patient's history were reviewed and updated as appropriate: allergies, current medications, past family history, past medical history, past social history, past surgical history and problem list.    Review of Systems   Constitutional: Negative for chills and fever.   Gastrointestinal: Negative for abdominal pain, anal bleeding, blood in stool, nausea and vomiting.   Genitourinary: Positive for hematuria (yesterday). Negative for decreased urine volume, difficulty urinating, dysuria, flank pain, frequency and urgency.         Current Outpatient Medications:   •  albuterol (PROVENTIL HFA;VENTOLIN HFA) 108 (90 BASE) MCG/ACT inhaler, Inhale 2 puffs Every 6 (Six) Hours As Needed for wheezing., Disp: , Rfl:   •  Aspirin 81 MG capsule, Chew 81 mg Daily., Disp: , Rfl:   •  calcium polycarbophil (FIBERCON) 625 MG tablet, Take 625 mg by mouth As Needed., Disp: , Rfl:   •  carboxymethylcellulose (REFRESH PLUS) 0.5 % solution, Administer 1 drop to both eyes 4 (Four) Times a Day As Needed for Dry Eyes., Disp: , Rfl:   •  Diclofenac Sodium (VOLTAREN) 1 % gel gel, Apply 4 g topically to the appropriate  area as directed 4 (Four) Times a Day As Needed., Disp: , Rfl:   •  docusate sodium (Colace) 100 MG capsule, Take 1 capsule by mouth 2 (Two) Times a Day., Disp: 60 capsule, Rfl: 1  •  empagliflozin (JARDIANCE) 10 MG tablet tablet, Take 10 mg by mouth Daily., Disp: , Rfl:   •  ezetimibe (ZETIA) 10 MG tablet, Take 1 tablet by mouth Daily., Disp: , Rfl:   •  finasteride (PROSCAR) 5 MG tablet, Take 1 tablet by mouth Daily., Disp: 30 tablet, Rfl: 3  •  fluticasone (FLONASE) 50 MCG/ACT nasal spray, 2 sprays into the nostril(s) as directed by provider Daily As Needed for Rhinitis or Allergies., Disp: , Rfl:   •  gabapentin (Neurontin) 300 MG capsule, Take 1 capsule by mouth Every Night. Please overnight., Disp: 30 capsule, Rfl: 2  •  guaiFENesin (MUCINEX) 600 MG 12 hr tablet, Take 1,200 mg by mouth As Needed for Congestion., Disp: , Rfl:   •  HYDROcodone-acetaminophen (NORCO) 5-325 MG per tablet, Take 1 tablet by mouth Every 6 (Six) Hours As Needed for Severe Pain (postop pain)., Disp: 10 tablet, Rfl: 0  •  insulin aspart (novoLOG FLEXPEN) 100 UNIT/ML solution pen-injector sc pen, Inject 40 Units under the skin into the appropriate area as directed Every Morning. 40 units 3 times daily, a fourth dose mid day if needed per glucose, Disp: , Rfl:   •  insulin detemir (LEVEMIR) 100 UNIT/ML injection, Inject 100 Units under the skin into the appropriate area as directed Daily. 110AM 110 units PM, Disp: , Rfl:   •  isosorbide mononitrate (IMDUR) 120 MG 24 hr tablet, Take 1 tablet by mouth Daily., Disp: 90 tablet, Rfl: 3  •  lamoTRIgine (LaMICtal) 200 MG tablet, Take 1 tablet by mouth 2 (Two) Times a Day., Disp: 180 tablet, Rfl: 1  •  levETIRAcetam (KEPPRA) 500 MG tablet, Take 3 tablets every morning, take 4 tablets every night., Disp: 630 tablet, Rfl: 1  •  metFORMIN (GLUCOPHAGE) 1000 MG tablet, Take 1 tablet by mouth 2 (Two) Times a Day With Meals. HOLD x 48 hours post contrast. May resume 3/18/18, Disp: , Rfl:   •  metoprolol  tartrate (LOPRESSOR) 100 MG tablet, Take 100 mg by mouth 2 (Two) Times a Day., Disp: , Rfl:   •  Multiple Vitamin (MULTI VITAMIN PO), Take 1 tablet by mouth Daily., Disp: , Rfl:   •  nitroglycerin (NITROSTAT) 0.4 MG SL tablet, Place 0.4 mg under the tongue Every 5 (Five) Minutes As Needed for Chest Pain. Take no more than 3 doses in 15 minutes., Disp: , Rfl:   •  ondansetron ODT (ZOFRAN-ODT) 4 MG disintegrating tablet, Place 1 tablet on the tongue Every 6 (Six) Hours As Needed for Nausea., Disp: 6 tablet, Rfl: 1  •  oxybutynin (DITROPAN) 5 MG tablet, Take 1 tablet by mouth Every 8 (Eight) Hours As Needed (bladder spasms)., Disp: 30 tablet, Rfl: 1  •  pantoprazole (PROTONIX) 40 MG EC tablet, Take 40 mg by mouth 2 (Two) Times a Day., Disp: , Rfl:   •  phenazopyridine (PYRIDIUM) 100 MG tablet, Take 1 tablet by mouth 3 (Three) Times a Day As Needed (urinary burning)., Disp: 20 tablet, Rfl: 1  •  psyllium (METAMUCIL) 58.6 % packet, Take 1 packet by mouth Daily As Needed (constipation)., Disp: , Rfl:   •  Rimegepant Sulfate (Nurtec) 75 MG tablet dispersible tablet, Take 1 tablet by mouth As Needed (Migraine)., Disp: 8 tablet, Rfl: 5  •  rosuvastatin (CRESTOR) 20 MG tablet, Take 20 mg by mouth Every Night., Disp: , Rfl:   •  sacubitril-valsartan (Entresto)  MG tablet, Take 1 tablet by mouth 2 (Two) Times a Day., Disp: 180 tablet, Rfl: 3  •  spironolactone (ALDACTONE) 25 MG tablet, Take 1 tablet by mouth Daily., Disp: 90 tablet, Rfl: 3    Past Medical History:   Diagnosis Date   • Arthritis    • Asthma    • Cancer (HCC)    • Carotid disease, bilateral (HCC)    • Chest pain    • Chronic diastolic congestive heart failure (HCC) 09/07/2019   • Chronic sinusitis    • Maribell bullosa    • Coronary artery disease involving native coronary artery of native heart with unstable angina pectoris (HCC) 01/17/2017   • Deviated septum    • Diabetes mellitus ([facility])    • Difficulty urinating    • Diverticulitis    • Enlarged prostate     • Fatty liver    • GERD (gastroesophageal reflux disease)    • Hyperlipidemia LDL goal <70 02/02/2017   • Hypertrophy of nasal turbinates    • Keratoderma    • Kidney stone    • Migraine    • Murmur, heart    • Myocardial infarction (HCC)    • Obesity    • Paroxysmal atrial fibrillation (HCC) 07/11/2019   • PONV (postoperative nausea and vomiting)    • Primary hypertension 10/16/2016   • Psoriasis    • Seizures (HCC)    • Sinus congestion    • Skin cancer    • Sleep apnea     not using cpap   • SOB (shortness of breath)    • Stroke (HCC)    • UTI (urinary tract infection)        Past Surgical History:   Procedure Laterality Date   • CARDIAC CATHETERIZATION  01/2016    Dr. Broadbent; widely patent previously placed stents in the left anterior descending and obstructive disease involving the diagonal branch which was treated medically   • CARDIAC CATHETERIZATION N/A 07/14/2017    Procedure: Left Heart Cath;  Surgeon: Wade Ramey MD;  Location:  [facility] CATH [facility]VASIVE LOC[facility];  Service:    • CARDIAC CATHETERIZATION Left 10/15/2018    Procedure: Cardiac Catheterization/Vascular Study;  Surgeon: Wade Ramey MD;  Location:  [facility];  Service: Cardiology   • CARDIAC CATHETERIZATION  10/15/2018    Procedure: Functional Flow [place];  Surgeon: Wade Ramey MD;  Location:  [facility];  Service: Cardiology   • CARDIAC CATHETERIZATION N/A 10/15/2018    Procedure: Left ventriculography;  Surgeon: Wade Ramey MD;  Location:  [facility];  Service: Cardiology   • CARDIAC CATHETERIZATION Left 06/26/2019    Procedure: Cardiac Catheterization/Vascular Study VEL OK  HE WILL WAIT 1 YEAR FOR SHOULDER SURGERY ;  Surgeon: Wade Ramey MD;  Location:  [facility];  Service: Cardiology   • CARDIAC CATHETERIZATION Left 04/30/2021    Procedure: Coronary angiography;  Surgeon: Sahil Llamas MD;  Location:  [facility];  Service: Cardiology;   Laterality: Left;   • CARDIAC CATHETERIZATION N/A 04/30/2021    Procedure: Percutaneous Coronary Intervention;  Surgeon: Sahil Llamas MD;  Location: [facility] CATH INVASIVE LOCATION;  Service: Cardiology;  Laterality: N/A;   • CARDIAC CATHETERIZATION N/A 11/09/2022    Procedure: Left Heart Cath with SVGs;  Surgeon: Wade Ramey MD;  Location: [facility] CATH INVASIVE LOCATION;  Service: Cardiology;  Laterality: N/A;   • CHOLECYSTECTOMY     • CHOLECYSTECTOMY WITH INTRAOPERATIVE CHOLANGIOGRAM N/A 08/01/2018    Procedure: CHOLECYSTECTOMY LAPAROSCOPIC INTRAOPERATIVE CHOLANGIOGRAM;  Surgeon: Shane Ann MD;  Location: [facility] OR;  Service: General   • COLONOSCOPY N/A 07/14/2020    Procedure: COLONOSCOPY WITH ANESTHESIA;  Surgeon: Anupam Morales DO;  Location: [facility] ENDOSCOPY;  Service: Gastroenterology;  Laterality: N/A;  pre: abdominal pain  post: diverticulosis  Vinayak Correia PA   • CORONARY ANGIOPLASTY     • CORONARY ARTERY BYPASS GRAFT N/A 07/06/2019    Procedure: CABG X2 WITH LIMA, LEFT LEG OVH, AND PLACEMENT OF LEFT FEMORAL ARTERIAL LINE;  Surgeon: Steven Tang MD;  Location: [facility] OR;  Service: Cardiothoracic   • CORONARY STENT PLACEMENT      x 6   • CYSTOSCOPY TRANSURETHRAL RESECTION OF PROSTATE N/A 1/23/2023    Procedure: CYSTOSCOPY TRANSURETHRAL RESECTION OF PROSTATE;  Surgeon: Latrell Pope MD;  Location: [facility] OR;  Service: Urology;  Laterality: N/A;   • ENDOSCOPIC FUNCTIONAL SINUS SURGERY (FESS) Bilateral 12/13/2017    Procedure: PROCEDURE PERFORMED:  Bilateral functional endoscopic anterior ethmoidectomy with bilateral middle meatal antrostomy Septoplasty Right kathia bullosa resection Bilateral inferior turbinate reduction via Coblation;  Surgeon: Mayank Ibarra MD;  Location: [facility] OR;  Service:    • ENDOSCOPY N/A 07/30/2018    Procedure: ESOPHAGOGASTRODUODENOSCOPY WITH ANESTHESIA;  Surgeon: Benitez Mas MD;  Location: [facility] ENDOSCOPY;  Service: Gastroenterology   •  "ENDOSCOPY N/A 2020    Procedure: ESOPHAGOGASTRODUODENOSCOPY WITH ANESTHESIA;  Surgeon: Anupam Morales DO;  Location: [facility] ENDOSCOPY;  Service: Gastroenterology;  Laterality: N/A;  pre: abdominal pain  post: esophagitis  Vinayak Correia PA   • HERNIA REPAIR      x2 inguinal area   • KIDNEY STONE SURGERY     • KNEE ARTHROSCOPY Right 2022    Procedure: RIGHT KNEE PARTIAL LATERAL MENISCECTOMY;  Surgeon: Pedro Pablo Song MD;  Location: [facility] OR;  Service: Orthopedics;  Laterality: Right;   • KNEE SURGERY Right    • OTHER SURGICAL HISTORY      urolift   • PROSTATE SURGERY      Dr. Badillo -    • ROTATOR CUFF REPAIR Right    • THUMB AMPUTATION Left     partial   • TOE AMPUTATION Right     big       Social History     Socioeconomic History   • Marital status:    Tobacco Use   • Smoking status: Former     Packs/day: 1.50     Years: 4.50     Pack years: 6.75     Types: Cigarettes, Cigars     Quit date:      Years since quittin.1   • Smokeless tobacco: Former     Types: Chew     Quit date:    Vaping Use   • Vaping Use: Never used   Substance and Sexual Activity   • Alcohol use: No   • Drug use: No   • Sexual activity: Defer       Family History   Problem Relation Age of Onset   • Heart disease Father    • COPD Mother    • Hypertension Mother    • Asthma Mother    • No Known Problems Sister    • Colon cancer Paternal Uncle    • Prostate cancer Maternal Grandfather    • No Known Problems Sister    • Colon cancer Maternal Grandmother    • Colon polyps Maternal Grandmother        Objective    Temp 97.2 °F (36.2 °C)   Ht 182.9 cm (72\")   Wt 119 kg (261 lb 6.4 oz)   BMI 35.45 kg/m²     Physical Exam  Constitutional:       Appearance: Normal appearance.   Abdominal:      Tenderness: There is no right CVA tenderness or left CVA tenderness.   Skin:     General: Skin is warm and dry.   Neurological:      Mental Status: He is alert and oriented to person, place, and time. "   Psychiatric:         Mood and Affect: Mood normal.         Behavior: Behavior normal.             Results for orders placed or performed in visit on 02/14/23   POC Urinalysis Dipstick, Multipro    Specimen: Urine   Result Value Ref Range    Color Yellow Yellow, Straw, Dark Yellow, [place]    Clarity, UA Clear Clear    Glucose, UA >=1000 mg/dL (3+) (A) Negative mg/dL    Bilirubin Negative Negative    Ketones, UA Negative Negative    Specific Gravity  1.020 1.005 - 1.030    Blood, UA Large (A) Negative    pH, Urine 5.5 5.0 - 8.0    Protein, POC 30 mg/dL (A) Negative mg/dL    Urobilinogen, UA 0.2 E.U./dL Normal, 0.2 E.U./dL    Nitrite, UA Negative Negative    Leukocytes Trace (A) Negative     Assessment and Plan    Diagnoses and all orders for this visit:    1. Gross hematuria (Primary)  -     POC Urinalysis Dipstick, Multipro      62-year-old male established patient and with new complaint of gross hematuria that started yesterday accompanied with small clots.     S/p TURP Dr. Pope 1/23/2023-due to difficulty accessing area required suprapubic placement and indwelling Drake cath in urethra-both since have been removed  F/C the removal 1 week ago yesterday  Started back on Eliquis approximately 4 days ago    Reports as of this morning urine is back to being clear and no difficulty urinating    Has not held the blood thinner     Urinalysis is showing large blood    No signs symptoms of infection    I feel that this is likely all due to starting back on the blood thinning medication ultimately.  Given that patient's urine has cleared back up no further work-up needed.  I did instruct patient if symptoms return and starts passing large clots to contact our office and to hold the blood thinning medication for a day or 2 to see if symptoms improve    Patient to keep scheduled follow-up appointment with Dr. Pope

## 2023-02-13 NOTE — TELEPHONE ENCOUNTER
Pt wife called stating that pt had catheter removed a week ago. Advised that Asif Perez PA-C recommended patient be seen. Placed on TEENA Ackerman schedule for 2/14/23

## 2023-02-14 ENCOUNTER — OFFICE VISIT (OUTPATIENT)
Dept: UROLOGY | Facility: CLINIC | Age: 63
End: 2023-02-14
Payer: OTHER GOVERNMENT

## 2023-02-14 VITALS — TEMPERATURE: 97.2 F | BODY MASS INDEX: 35.41 KG/M2 | HEIGHT: 72 IN | WEIGHT: 261.4 LBS

## 2023-02-14 DIAGNOSIS — R31.0 GROSS HEMATURIA: Primary | ICD-10-CM

## 2023-02-14 LAB
BILIRUB BLD-MCNC: NEGATIVE MG/DL
CLARITY, POC: CLEAR
COLOR UR: YELLOW
GLUCOSE UR STRIP-MCNC: ABNORMAL MG/DL
KETONES UR QL: NEGATIVE
LEUKOCYTE EST, POC: ABNORMAL
NITRITE UR-MCNC: NEGATIVE MG/ML
PH UR: 5.5 [PH] (ref 5–8)
PROT UR STRIP-MCNC: ABNORMAL MG/DL
RBC # UR STRIP: ABNORMAL /UL
SP GR UR: 1.02 (ref 1–1.03)
UROBILINOGEN UR QL: ABNORMAL

## 2023-02-14 PROCEDURE — 81001 URINALYSIS AUTO W/SCOPE: CPT

## 2023-02-14 PROCEDURE — 99024 POSTOP FOLLOW-UP VISIT: CPT

## 2023-02-15 ENCOUNTER — OFFICE VISIT (OUTPATIENT)
Dept: CARDIAC SURGERY | Facility: CLINIC | Age: 63
End: 2023-02-15
Payer: OTHER GOVERNMENT

## 2023-02-15 ENCOUNTER — HOSPITAL ENCOUNTER (OUTPATIENT)
Dept: CT IMAGING | Facility: HOSPITAL | Age: 63
Discharge: HOME | End: 2023-02-15
Admitting: NURSE PRACTITIONER
Payer: MEDICARE

## 2023-02-15 VITALS
DIASTOLIC BLOOD PRESSURE: 80 MMHG | SYSTOLIC BLOOD PRESSURE: 126 MMHG | OXYGEN SATURATION: 96 % | WEIGHT: 261 LBS | HEIGHT: 72 IN | BODY MASS INDEX: 35.35 KG/M2 | HEART RATE: 61 BPM

## 2023-02-15 DIAGNOSIS — I71.21 ANEURYSM OF ASCENDING AORTA WITHOUT RUPTURE: Primary | ICD-10-CM

## 2023-02-15 DIAGNOSIS — I71.20 THORACIC AORTIC ANEURYSM WITHOUT RUPTURE: ICD-10-CM

## 2023-02-15 LAB — CREAT BLDA-MCNC: 1.2 MG/DL (ref 0.6–1.3)

## 2023-02-15 PROCEDURE — 71275 CT ANGIOGRAPHY CHEST: CPT

## 2023-02-15 PROCEDURE — 82565 ASSAY OF CREATININE: CPT

## 2023-02-15 PROCEDURE — 99213 OFFICE O/P EST LOW 20 MIN: CPT | Performed by: THORACIC SURGERY (CARDIOTHORACIC VASCULAR SURGERY)

## 2023-02-15 PROCEDURE — 0 IOPAMIDOL PER 1 ML: Performed by: NURSE PRACTITIONER

## 2023-02-15 RX ORDER — ACETAMINOPHEN 500 MG
500 TABLET ORAL AS NEEDED
COMMUNITY

## 2023-02-15 RX ADMIN — IOPAMIDOL 100 ML: 755 INJECTION, SOLUTION INTRAVENOUS at 09:24

## 2023-02-21 ENCOUNTER — OFFICE VISIT (OUTPATIENT)
Dept: NEUROLOGY | Facility: CLINIC | Age: 63
End: 2023-02-21
Payer: OTHER GOVERNMENT

## 2023-02-21 VITALS
HEIGHT: 72 IN | BODY MASS INDEX: 34.81 KG/M2 | WEIGHT: 257 LBS | HEART RATE: 63 BPM | SYSTOLIC BLOOD PRESSURE: 124 MMHG | OXYGEN SATURATION: 97 % | DIASTOLIC BLOOD PRESSURE: 78 MMHG

## 2023-02-21 DIAGNOSIS — G43.009 MIGRAINE WITHOUT AURA AND WITHOUT STATUS MIGRAINOSUS, NOT INTRACTABLE: ICD-10-CM

## 2023-02-21 DIAGNOSIS — G40.909 SEIZURE DISORDER: Primary | ICD-10-CM

## 2023-02-21 DIAGNOSIS — G63 POLYNEUROPATHY ASSOCIATED WITH UNDERLYING DISEASE: ICD-10-CM

## 2023-02-21 PROCEDURE — 99214 OFFICE O/P EST MOD 30 MIN: CPT | Performed by: NURSE PRACTITIONER

## 2023-02-21 NOTE — PROGRESS NOTES
Neurology Progress Note      Chief Complaint:    Seizure  Neuropathy   Headache    Subjective   History of Present Illness:  Carlos A Boyer Jr. is a 62 y.o. male who presents today for seizure and headache.  He is routinely followed by Vinayak Correia PA for primary care.     Seizure  He remains on Lamictal 200mg BID and Keppra 1500mg in the AM and 2000mg in the PM.  He denies any seizures since last being seen.  He denies any missed doses of medication.  He has no new questions or complaints with the card to his seizure at this time.  He did not receive the MRI as he had quite a bit of other medical problems going on at the time and simply forgot about it.  He would like to have this rescheduled.    Neuropathy  He continues gabapentin 300 mg nightly and reports that this is doing well.  He has no questions or complaints and states that his pain is adequately controlled.    Headache  He reports he is only had 1 migraine since last being seen.  He is able to take Nurtec and completely abort this.  He has not changed in character or quality and he reports that he is doing well.    Allergies:    Flagyl [metronidazole], Atorvastatin, and Ciprofloxacin    Medications:  Current Outpatient Medications   Medication Sig Dispense Refill   • acetaminophen (TYLENOL) 500 MG tablet Take 500 mg by mouth As Needed for Mild Pain. OTC     • albuterol (PROVENTIL HFA;VENTOLIN HFA) 108 (90 BASE) MCG/ACT inhaler Inhale 2 puffs Every 6 (Six) Hours As Needed for wheezing.     • Aspirin 81 MG capsule Chew 81 mg Daily.     • calcium polycarbophil (FIBERCON) 625 MG tablet Take 625 mg by mouth As Needed.     • carboxymethylcellulose (REFRESH PLUS) 0.5 % solution Administer 1 drop to both eyes 4 (Four) Times a Day As Needed for Dry Eyes.     • Diclofenac Sodium (VOLTAREN) 1 % gel gel Apply 4 g topically to the appropriate area as directed 4 (Four) Times a Day As Needed.     • docusate sodium (Colace) 100 MG capsule Take 1 capsule by  mouth 2 (Two) Times a Day. 60 capsule 1   • empagliflozin (JARDIANCE) 10 MG tablet tablet Take 10 mg by mouth Daily.     • ezetimibe (ZETIA) 10 MG tablet Take 1 tablet by mouth Daily.     • finasteride (PROSCAR) 5 MG tablet Take 1 tablet by mouth Daily. 30 tablet 3   • fluticasone (FLONASE) 50 MCG/ACT nasal spray 2 sprays into the nostril(s) as directed by provider Daily As Needed for Rhinitis or Allergies.     • gabapentin (Neurontin) 300 MG capsule Take 1 capsule by mouth Every Night. Please overnight. 30 capsule 2   • guaiFENesin (MUCINEX) 600 MG 12 hr tablet Take 1,200 mg by mouth As Needed for Congestion.     • HYDROcodone-acetaminophen (NORCO) 5-325 MG per tablet Take 1 tablet by mouth Every 6 (Six) Hours As Needed for Severe Pain (postop pain). 10 tablet 0   • insulin aspart (novoLOG FLEXPEN) 100 UNIT/ML solution pen-injector sc pen Inject 40 Units under the skin into the appropriate area as directed Every Morning. 40 units 3 times daily, a fourth dose mid day if needed per glucose     • insulin detemir (LEVEMIR) 100 UNIT/ML injection Inject 100 Units under the skin into the appropriate area as directed Daily. 110AM 110 units PM     • isosorbide mononitrate (IMDUR) 120 MG 24 hr tablet Take 1 tablet by mouth Daily. 90 tablet 3   • lamoTRIgine (LaMICtal) 200 MG tablet Take 1 tablet by mouth 2 (Two) Times a Day. 180 tablet 1   • levETIRAcetam (KEPPRA) 500 MG tablet Take 3 tablets every morning, take 4 tablets every night. 630 tablet 1   • metFORMIN (GLUCOPHAGE) 1000 MG tablet Take 1 tablet by mouth 2 (Two) Times a Day With Meals. HOLD x 48 hours post contrast. May resume 3/18/18     • metoprolol tartrate (LOPRESSOR) 100 MG tablet Take 100 mg by mouth 2 (Two) Times a Day.     • Multiple Vitamin (MULTI VITAMIN PO) Take 1 tablet by mouth Daily.     • nitroglycerin (NITROSTAT) 0.4 MG SL tablet Place 0.4 mg under the tongue Every 5 (Five) Minutes As Needed for Chest Pain. Take no more than 3 doses in 15 minutes.      • ondansetron ODT (ZOFRAN-ODT) 4 MG disintegrating tablet Place 1 tablet on the tongue Every 6 (Six) Hours As Needed for Nausea. 6 tablet 1   • oxybutynin (DITROPAN) 5 MG tablet Take 1 tablet by mouth Every 8 (Eight) Hours As Needed (bladder spasms). 30 tablet 1   • pantoprazole (PROTONIX) 40 MG EC tablet Take 40 mg by mouth 2 (Two) Times a Day.     • phenazopyridine (PYRIDIUM) 100 MG tablet Take 1 tablet by mouth 3 (Three) Times a Day As Needed (urinary burning). 20 tablet 1   • psyllium (METAMUCIL) 58.6 % packet Take 1 packet by mouth Daily As Needed (constipation).     • Rimegepant Sulfate (Nurtec) 75 MG tablet dispersible tablet Take 1 tablet by mouth As Needed (Migraine). 8 tablet 5   • rosuvastatin (CRESTOR) 20 MG tablet Take 20 mg by mouth Every Night.     • sacubitril-valsartan (Entresto)  MG tablet Take 1 tablet by mouth 2 (Two) Times a Day. 180 tablet 3   • spironolactone (ALDACTONE) 25 MG tablet Take 1 tablet by mouth Daily. 90 tablet 3     No current facility-administered medications for this visit.     Current outpatient and discharge medications have been reconciled for the patient.  Reviewed by: TEENA Aaron    Past Medical History:  Past Medical History:   Diagnosis Date   • Arthritis    • Asthma    • Cancer ([facility])    • Carotid disease, bilateral ([facility])    • Chest pain    • Chronic diastolic congestive heart failure (HCC) 09/07/2019   • Chronic sinusitis    • Maribell bullosa    • Coronary artery disease involving native coronary artery of native heart with unstable angina pectoris (HCC) 01/17/2017   • Deviated septum    • Diabetes mellitus ([facility])    • Difficulty urinating    • Diverticulitis    • Enlarged prostate    • Fatty liver    • GERD (gastroesophageal reflux disease)    • Hyperlipidemia LDL goal <70 02/02/2017   • Hypertrophy of nasal turbinates    • Keratoderma    • Kidney stone    • Migraine    • Murmur, heart    • Myocardial infarction (HCC)    • Obesity    • Paroxysmal atrial  fibrillation (HCC) 07/11/2019   • PONV (postoperative nausea and vomiting)    • Primary hypertension 10/16/2016   • Psoriasis    • Seizures (HCC)    • Sinus congestion    • Skin cancer    • Sleep apnea     not using cpap   • SOB (shortness of breath)    • Stroke (HCC)    • UTI (urinary tract infection)      Past Surgical History:   Procedure Laterality Date   • CARDIAC CATHETERIZATION  01/2016    Dr. Broadbent; widely patent previously placed stents in the left anterior descending and obstructive disease involving the diagonal branch which was treated medically   • CARDIAC CATHETERIZATION N/A 07/14/2017    Procedure: Left Heart Cath;  Surgeon: Wade Ramey MD;  Location:  [facility];  Service:    • CARDIAC CATHETERIZATION Left 10/15/2018    Procedure: Cardiac Catheterization/Vascular Study;  Surgeon: Wade Ramey MD;  Location:  [facility];  Service: Cardiology   • CARDIAC CATHETERIZATION  10/15/2018    Procedure: Functional Flow [place];  Surgeon: Wade Ramey MD;  Location:  [facility];  Service: Cardiology   • CARDIAC CATHETERIZATION N/A 10/15/2018    Procedure: Left ventriculography;  Surgeon: Wade Ramey MD;  Location:  [facility];  Service: Cardiology   • CARDIAC CATHETERIZATION Left 06/26/2019    Procedure: Cardiac Catheterization/Vascular Study VEL OK  HE WILL WAIT 1 YEAR FOR SHOULDER SURGERY ;  Surgeon: Wade Ramey MD;  Location:  [facility];  Service: Cardiology   • CARDIAC CATHETERIZATION Left 04/30/2021    Procedure: Coronary angiography;  Surgeon: Sahil Llamas MD;  Location:  [facility];  Service: Cardiology;  Laterality: Left;   • CARDIAC CATHETERIZATION N/A 04/30/2021    Procedure: Percutaneous Coronary Intervention;  Surgeon: Sahil Llamas MD;  Location:  [facility];  Service: Cardiology;  Laterality: N/A;   • CARDIAC CATHETERIZATION N/A 11/09/2022    Procedure:  Left Heart Cath with SVGs;  Surgeon: Wade Ramey MD;  Location: [facility] CATH INVASIVE LOCATION;  Service: Cardiology;  Laterality: N/A;   • CHOLECYSTECTOMY     • CHOLECYSTECTOMY WITH INTRAOPERATIVE CHOLANGIOGRAM N/A 08/01/2018    Procedure: CHOLECYSTECTOMY LAPAROSCOPIC INTRAOPERATIVE CHOLANGIOGRAM;  Surgeon: Shane Ann MD;  Location: [facility] OR;  Service: General   • COLONOSCOPY N/A 07/14/2020    Procedure: COLONOSCOPY WITH ANESTHESIA;  Surgeon: Anupam Morales DO;  Location: [facility] ENDOSCOPY;  Service: Gastroenterology;  Laterality: N/A;  pre: abdominal pain  post: diverticulosis  Vinayak Correia PA   • CORONARY ANGIOPLASTY     • CORONARY ARTERY BYPASS GRAFT N/A 07/06/2019    Procedure: CABG X2 WITH LIMA, LEFT LEG OVH, AND PLACEMENT OF LEFT FEMORAL ARTERIAL LINE;  Surgeon: Steven Tang MD;  Location: [facility] OR;  Service: Cardiothoracic   • CORONARY STENT PLACEMENT      x 6   • CYSTOSCOPY TRANSURETHRAL RESECTION OF PROSTATE N/A 1/23/2023    Procedure: CYSTOSCOPY TRANSURETHRAL RESECTION OF PROSTATE;  Surgeon: Latrell Pope MD;  Location: [facility] OR;  Service: Urology;  Laterality: N/A;   • ENDOSCOPIC FUNCTIONAL SINUS SURGERY (FESS) Bilateral 12/13/2017    Procedure: PROCEDURE PERFORMED:  Bilateral functional endoscopic anterior ethmoidectomy with bilateral middle meatal antrostomy Septoplasty Right kathia bullosa resection Bilateral inferior turbinate reduction via Coblation;  Surgeon: Mayank Ibarra MD;  Location: [facility] OR;  Service:    • ENDOSCOPY N/A 07/30/2018    Procedure: ESOPHAGOGASTRODUODENOSCOPY WITH ANESTHESIA;  Surgeon: Benitez Mas MD;  Location: [facility] ENDOSCOPY;  Service: Gastroenterology   • ENDOSCOPY N/A 07/14/2020    Procedure: ESOPHAGOGASTRODUODENOSCOPY WITH ANESTHESIA;  Surgeon: Anupam Morales DO;  Location: [facility] ENDOSCOPY;  Service: Gastroenterology;  Laterality: N/A;  pre: abdominal pain  post: esophagitis  Vinayak Correia PA   • HERNIA REPAIR       "x2 inguinal area   • KIDNEY STONE SURGERY     • KNEE ARTHROSCOPY Right 2022    Procedure: RIGHT KNEE PARTIAL LATERAL MENISCECTOMY;  Surgeon: Pedro Pablo Song MD;  Location: [facility] OR;  Service: Orthopedics;  Laterality: Right;   • KNEE SURGERY Right    • OTHER SURGICAL HISTORY      urolift   • PROSTATE SURGERY      Dr. Badillo -    • ROTATOR CUFF REPAIR Right    • THUMB AMPUTATION Left     partial   • TOE AMPUTATION Right     big     Family History   Problem Relation Age of Onset   • Heart disease Father    • COPD Mother    • Hypertension Mother    • Asthma Mother    • No Known Problems Sister    • Colon cancer Paternal Uncle    • Prostate cancer Maternal Grandfather    • No Known Problems Sister    • Colon cancer Maternal Grandmother    • Colon polyps Maternal Grandmother      Social History     Tobacco Use   • Smoking status: Former     Packs/day: 1.50     Years: 18.00     Pack years: 27.00     Types: Cigarettes, Cigars     Start date:      Quit date:      Years since quittin.1   • Smokeless tobacco: Former     Types: Chew     Quit date:    Vaping Use   • Vaping Use: Never used   Substance Use Topics   • Alcohol use: No     Comment: 0   • Drug use: No     Review of Systems   Constitutional: Negative for activity change and fatigue.   Eyes: Positive for photophobia (with headache).   Gastrointestinal: Positive for nausea (with headache).   Musculoskeletal: Negative for gait problem.   Neurological: Positive for seizures and headache. Negative for facial asymmetry.         Objective   Vital Signs:  Heart Rate:  [63] 63  BP: (124)/(78) 124/78      23  0838   Weight: 117 kg (257 lb)     182.9 cm (72\")  Body mass index is 34.86 kg/m².    Physical Exam  Vitals reviewed.   Constitutional:       Appearance: Normal appearance.   HENT:      Head: Normocephalic.      Mouth/Throat:      Pharynx: Oropharynx is clear.   Eyes:      General: Lids are normal.      Extraocular Movements: " Extraocular movements intact.      Pupils: Pupils are equal, round, and reactive to light.   Cardiovascular:      Rate and Rhythm: Normal rate and regular rhythm.      Pulses: Normal pulses.   Pulmonary:      Effort: Pulmonary effort is normal.   Musculoskeletal:         General: Normal range of motion.      Cervical back: Normal range of motion and neck supple.   Skin:     General: Skin is warm and dry.      Capillary Refill: Capillary refill takes less than 2 seconds.   Neurological:      Motor: Motor strength is normal.      Coordination: Coordination is intact.      Deep Tendon Reflexes: Reflexes are normal and symmetric.   Psychiatric:         Mood and Affect: Mood normal.         Speech: Speech normal.       Neurological Exam  Mental Status  Awake, alert and oriented to person, place and time. Recent and remote memory are intact. Speech is normal. Language is fluent with no aphasia. Attention and concentration are normal.    Cranial Nerves  CN II: Visual acuity is normal. Visual fields full to confrontation.  CN III, IV, VI: Extraocular movements intact bilaterally. Normal lids and orbits bilaterally. Pupils equal round and reactive to light bilaterally.  CN V: Facial sensation is normal.  CN VII: Full and symmetric facial movement.  CN IX, X: Palate elevates symmetrically. Normal gag reflex.  CN XI: Shoulder shrug strength is normal.  CN XII: Tongue midline without atrophy or fasciculations.    Motor   Strength is 5/5 throughout all four extremities.    Sensory  Sensation is intact to light touch, pinprick, vibration and proprioception in all four extremities.    Reflexes  Deep tendon reflexes are 2+ and symmetric in all four extremities.    Coordination    Finger-to-nose, rapid alternating movements and heel-to-shin normal bilaterally without dysmetria.    Gait  Normal casual, toe, heel and tandem gait.    Results Review:    Lab Results   Component Value Date    GLUCOSE 138 (H) 01/16/2023    BUN 21  01/16/2023    CREATININE 1.20 02/15/2023    EGFRIFNONA 75 01/21/2022    BCR 22.1 01/16/2023    K 4.7 01/16/2023    CO2 23.0 01/16/2023    CALCIUM 9.6 01/16/2023    PROTENTOTREF 6.3 02/16/2015    ALBUMIN 4.40 11/29/2022    LABIL2 1.4 02/16/2015    AST 17 11/29/2022    ALT 23 11/29/2022     Lab Results   Component Value Date    WBC 5.93 01/16/2023    HGB 14.5 01/16/2023    HCT 46.9 01/16/2023    MCV 95.1 01/16/2023     (L) 01/16/2023     Lab Results   Component Value Date    CHOL 139 04/29/2021    CHLPL 143 01/07/2016    TRIG 599 (H) 04/29/2021    HDL 23 (L) 04/29/2021    LDL 33 04/29/2021     Lab Results   Component Value Date    TSH 1.700 10/25/2019     Lab Results   Component Value Date    HGBA1C 9.30 (H) 04/28/2021     Lab Results   Component Value Date    FOLATE 8.83 11/29/2022     Lab Results   Component Value Date    UYKXTRKE42 609 11/29/2022        Plan .  Assessment:  Carlos A Boyer Jr. is a 62 y.o. male who presents with seizure, neuropathy and migraine.  His seizures are stable and he continues to take his medications without missed doses.  He continues seizure precautions.  Additionally, his neuropathy and his headaches are stable as he continues both gabapentin 300 mg nightly and Nurtec 75 mg as needed to help with his headaches and his neuropathy.  He has not had any falls and he is doing well.  He has recently had a procedure and is recovering well from this.  He has no questions or complaints and is doing well.    Plan:  • MRI Brain with and without  • CMP, CBC, Magnesium, TSH, Folate, B12  • Continue Nurtec 75mg as needed  • Continue Lamictal 200mg BID  • Continue Keppra 1500mg in the am and 2000mg in the pm  • Continue Gabapentin 300mg nightly.  • Call with concerns    The patient and I have discussed the plan of care and he is in full agreement at this time.     Follow-Up:  Return in about 6 months (around 8/21/2023) for Seizure, Migraine, Neuropathy.       Flako Freeman,  APRN  02/21/23  12:46 CST

## 2023-02-23 ENCOUNTER — HOSPITAL ENCOUNTER (EMERGENCY)
Facility: HOSPITAL | Age: 63
Discharge: HOME | End: 2023-02-23
Attending: STUDENT IN AN ORGANIZED HEALTH CARE EDUCATION/TRAINING PROGRAM | Admitting: STUDENT IN AN ORGANIZED HEALTH CARE EDUCATION/TRAINING PROGRAM
Payer: OTHER GOVERNMENT

## 2023-02-23 ENCOUNTER — APPOINTMENT (OUTPATIENT)
Dept: CT IMAGING | Facility: HOSPITAL | Age: 63
End: 2023-02-23
Payer: OTHER GOVERNMENT

## 2023-02-23 VITALS
SYSTOLIC BLOOD PRESSURE: 120 MMHG | WEIGHT: 260 LBS | BODY MASS INDEX: 34.46 KG/M2 | TEMPERATURE: 98 F | DIASTOLIC BLOOD PRESSURE: 75 MMHG | HEIGHT: 73 IN | RESPIRATION RATE: 18 BRPM | HEART RATE: 66 BPM | OXYGEN SATURATION: 98 %

## 2023-02-23 DIAGNOSIS — R07.81 PLEURITIC CHEST PAIN: Primary | ICD-10-CM

## 2023-02-23 LAB
ALBUMIN SERPL-MCNC: 4 G/DL (ref 3.5–5.2)
ALBUMIN/GLOB SERPL: 1.7 G/DL
ALP SERPL-CCNC: 118 U/L (ref 39–117)
ALT SERPL W P-5'-P-CCNC: 20 U/L (ref 1–41)
ANION GAP SERPL CALCULATED.3IONS-SCNC: 9 MMOL/L (ref 5–15)
APTT PPP: 33.5 SECONDS (ref 24.1–35)
AST SERPL-CCNC: 17 U/L (ref 1–40)
B PARAPERT DNA SPEC QL NAA+PROBE: NOT DETECTED
B PERT DNA SPEC QL NAA+PROBE: NOT DETECTED
BASOPHILS # BLD AUTO: 0.02 10*3/MM3 (ref 0–0.2)
BASOPHILS NFR BLD AUTO: 0.4 % (ref 0–1.5)
BILIRUB SERPL-MCNC: 0.5 MG/DL (ref 0–1.2)
BUN SERPL-MCNC: 23 MG/DL (ref 8–23)
BUN/CREAT SERPL: 19.2 (ref 7–25)
C PNEUM DNA NPH QL NAA+NON-PROBE: NOT DETECTED
CALCIUM SPEC-SCNC: 9.1 MG/DL (ref 8.6–10.5)
CHLORIDE SERPL-SCNC: 108 MMOL/L (ref 98–107)
CO2 SERPL-SCNC: 21 MMOL/L (ref 22–29)
CREAT SERPL-MCNC: 1.2 MG/DL (ref 0.76–1.27)
DEPRECATED RDW RBC AUTO: 43.8 FL (ref 37–54)
EGFRCR SERPLBLD CKD-EPI 2021: 67.5 ML/MIN/1.73
EOSINOPHIL # BLD AUTO: 0.15 10*3/MM3 (ref 0–0.4)
EOSINOPHIL NFR BLD AUTO: 3.2 % (ref 0.3–6.2)
ERYTHROCYTE [DISTWIDTH] IN BLOOD BY AUTOMATED COUNT: 12.9 % (ref 12.3–15.4)
FLUAV SUBTYP SPEC NAA+PROBE: NOT DETECTED
FLUBV RNA ISLT QL NAA+PROBE: NOT DETECTED
GLOBULIN UR ELPH-MCNC: 2.4 GM/DL
GLUCOSE SERPL-MCNC: 186 MG/DL (ref 65–99)
HADV DNA SPEC NAA+PROBE: NOT DETECTED
HCOV 229E RNA SPEC QL NAA+PROBE: NOT DETECTED
HCOV HKU1 RNA SPEC QL NAA+PROBE: NOT DETECTED
HCOV NL63 RNA SPEC QL NAA+PROBE: NOT DETECTED
HCOV OC43 RNA SPEC QL NAA+PROBE: NOT DETECTED
HCT VFR BLD AUTO: 42.2 % (ref 37.5–51)
HGB BLD-MCNC: 13.4 G/DL (ref 13–17.7)
HMPV RNA NPH QL NAA+NON-PROBE: NOT DETECTED
HPIV1 RNA ISLT QL NAA+PROBE: NOT DETECTED
HPIV2 RNA SPEC QL NAA+PROBE: NOT DETECTED
HPIV3 RNA NPH QL NAA+PROBE: NOT DETECTED
HPIV4 P GENE NPH QL NAA+PROBE: NOT DETECTED
INR PPP: 1.11 (ref 0.91–1.09)
LYMPHOCYTES # BLD AUTO: 1.56 10*3/MM3 (ref 0.7–3.1)
LYMPHOCYTES NFR BLD AUTO: 33.3 % (ref 19.6–45.3)
M PNEUMO IGG SER IA-ACNC: NOT DETECTED
MCH RBC QN AUTO: 29.3 PG (ref 26.6–33)
MCHC RBC AUTO-ENTMCNC: 31.8 G/DL (ref 31.5–35.7)
MCV RBC AUTO: 92.3 FL (ref 79–97)
MONOCYTES # BLD AUTO: 0.32 10*3/MM3 (ref 0.1–0.9)
MONOCYTES NFR BLD AUTO: 6.8 % (ref 5–12)
NEUTROPHILS NFR BLD AUTO: 2.63 10*3/MM3 (ref 1.7–7)
NEUTROPHILS NFR BLD AUTO: 56.1 % (ref 42.7–76)
NT-PROBNP SERPL-MCNC: 208.1 PG/ML (ref 0–900)
PLATELET # BLD AUTO: 125 10*3/MM3 (ref 140–450)
PMV BLD AUTO: 10.9 FL (ref 6–12)
POTASSIUM SERPL-SCNC: 4.8 MMOL/L (ref 3.5–5.2)
PROT SERPL-MCNC: 6.4 G/DL (ref 6–8.5)
PROTHROMBIN TIME: 14.4 SECONDS (ref 11.8–14.8)
RBC # BLD AUTO: 4.57 10*6/MM3 (ref 4.14–5.8)
RHINOVIRUS RNA SPEC NAA+PROBE: NOT DETECTED
RSV RNA NPH QL NAA+NON-PROBE: NOT DETECTED
SARS-COV-2 RNA NPH QL NAA+NON-PROBE: NOT DETECTED
SODIUM SERPL-SCNC: 138 MMOL/L (ref 136–145)
TROPONIN T SERPL HS-MCNC: 13 NG/L
WBC NRBC COR # BLD: 4.69 10*3/MM3 (ref 3.4–10.8)

## 2023-02-23 PROCEDURE — 25510000001 IOPAMIDOL PER 1 ML: Performed by: STUDENT IN AN ORGANIZED HEALTH CARE EDUCATION/TRAINING PROGRAM

## 2023-02-23 PROCEDURE — 93010 ELECTROCARDIOGRAM REPORT: CPT | Performed by: EMERGENCY MEDICINE

## 2023-02-23 PROCEDURE — 85610 PROTHROMBIN TIME: CPT | Performed by: STUDENT IN AN ORGANIZED HEALTH CARE EDUCATION/TRAINING PROGRAM

## 2023-02-23 PROCEDURE — 85025 COMPLETE CBC W/AUTO DIFF WBC: CPT | Performed by: STUDENT IN AN ORGANIZED HEALTH CARE EDUCATION/TRAINING PROGRAM

## 2023-02-23 PROCEDURE — 80053 COMPREHEN METABOLIC PANEL: CPT | Performed by: STUDENT IN AN ORGANIZED HEALTH CARE EDUCATION/TRAINING PROGRAM

## 2023-02-23 PROCEDURE — 71275 CT ANGIOGRAPHY CHEST: CPT

## 2023-02-23 PROCEDURE — 93005 ELECTROCARDIOGRAM TRACING: CPT | Performed by: STUDENT IN AN ORGANIZED HEALTH CARE EDUCATION/TRAINING PROGRAM

## 2023-02-23 PROCEDURE — 25010000002 KETOROLAC TROMETHAMINE PER 15 MG: Performed by: STUDENT IN AN ORGANIZED HEALTH CARE EDUCATION/TRAINING PROGRAM

## 2023-02-23 PROCEDURE — 99283 EMERGENCY DEPT VISIT LOW MDM: CPT

## 2023-02-23 PROCEDURE — 84484 ASSAY OF TROPONIN QUANT: CPT | Performed by: STUDENT IN AN ORGANIZED HEALTH CARE EDUCATION/TRAINING PROGRAM

## 2023-02-23 PROCEDURE — 83880 ASSAY OF NATRIURETIC PEPTIDE: CPT | Performed by: STUDENT IN AN ORGANIZED HEALTH CARE EDUCATION/TRAINING PROGRAM

## 2023-02-23 PROCEDURE — 0202U NFCT DS 22 TRGT SARS-COV-2: CPT | Performed by: STUDENT IN AN ORGANIZED HEALTH CARE EDUCATION/TRAINING PROGRAM

## 2023-02-23 PROCEDURE — 85730 THROMBOPLASTIN TIME PARTIAL: CPT | Performed by: STUDENT IN AN ORGANIZED HEALTH CARE EDUCATION/TRAINING PROGRAM

## 2023-02-23 PROCEDURE — 36415 COLL VENOUS BLD VENIPUNCTURE: CPT

## 2023-02-23 PROCEDURE — 96374 THER/PROPH/DIAG INJ IV PUSH: CPT

## 2023-02-23 RX ORDER — KETOROLAC TROMETHAMINE 15 MG/ML
15 INJECTION, SOLUTION INTRAMUSCULAR; INTRAVENOUS ONCE
Status: COMPLETED | OUTPATIENT
Start: 2023-02-23 | End: 2023-02-23

## 2023-02-23 RX ORDER — ASPIRIN 81 MG/1
243 TABLET ORAL ONCE
Status: COMPLETED | OUTPATIENT
Start: 2023-02-23 | End: 2023-02-23

## 2023-02-23 RX ADMIN — IOPAMIDOL 100 ML: 755 INJECTION, SOLUTION INTRAVENOUS at 12:49

## 2023-02-23 RX ADMIN — ASPIRIN 243 MG: 81 TABLET, COATED ORAL at 10:52

## 2023-02-23 RX ADMIN — KETOROLAC TROMETHAMINE 15 MG: 15 INJECTION, SOLUTION INTRAMUSCULAR; INTRAVENOUS at 11:13

## 2023-02-25 LAB
QT INTERVAL: 432 MS
QTC INTERVAL: 434 MS

## 2023-02-27 ENCOUNTER — ANESTHESIA (OUTPATIENT)
Dept: PERIOP | Facility: HOSPITAL | Age: 63
End: 2023-02-27
Payer: MEDICARE

## 2023-02-27 ENCOUNTER — ANESTHESIA EVENT (OUTPATIENT)
Dept: PERIOP | Facility: HOSPITAL | Age: 63
End: 2023-02-27
Payer: MEDICARE

## 2023-02-27 ENCOUNTER — HOSPITAL ENCOUNTER (OUTPATIENT)
Facility: HOSPITAL | Age: 63
Setting detail: HOSPITAL OUTPATIENT SURGERY
Discharge: HOME | End: 2023-02-27
Attending: OTOLARYNGOLOGY | Admitting: OTOLARYNGOLOGY
Payer: MEDICARE

## 2023-02-27 VITALS
TEMPERATURE: 97 F | DIASTOLIC BLOOD PRESSURE: 65 MMHG | HEART RATE: 52 BPM | RESPIRATION RATE: 14 BRPM | WEIGHT: 256.17 LBS | BODY MASS INDEX: 35.86 KG/M2 | SYSTOLIC BLOOD PRESSURE: 109 MMHG | HEIGHT: 71 IN | OXYGEN SATURATION: 95 %

## 2023-02-27 LAB
GLUCOSE BLDC GLUCOMTR-MCNC: 159 MG/DL (ref 70–130)
GLUCOSE BLDC GLUCOMTR-MCNC: 169 MG/DL (ref 70–130)

## 2023-02-27 PROCEDURE — 42975 DISE EVAL SLP DO BRTH FLX DX: CPT | Performed by: OTOLARYNGOLOGY

## 2023-02-27 PROCEDURE — 82962 GLUCOSE BLOOD TEST: CPT

## 2023-02-27 PROCEDURE — 25010000002 PROPOFOL 200 MG/20ML EMULSION

## 2023-02-27 RX ORDER — FENTANYL CITRATE 50 UG/ML
25 INJECTION, SOLUTION INTRAMUSCULAR; INTRAVENOUS
Status: DISCONTINUED | OUTPATIENT
Start: 2023-02-27 | End: 2023-02-27 | Stop reason: HOSPADM

## 2023-02-27 RX ORDER — DROPERIDOL 2.5 MG/ML
0.62 INJECTION, SOLUTION INTRAMUSCULAR; INTRAVENOUS ONCE AS NEEDED
Status: DISCONTINUED | OUTPATIENT
Start: 2023-02-27 | End: 2023-02-27 | Stop reason: HOSPADM

## 2023-02-27 RX ORDER — SODIUM CHLORIDE 9 MG/ML
40 INJECTION, SOLUTION INTRAVENOUS AS NEEDED
Status: DISCONTINUED | OUTPATIENT
Start: 2023-02-27 | End: 2023-02-27 | Stop reason: HOSPADM

## 2023-02-27 RX ORDER — SODIUM CHLORIDE 0.9 % (FLUSH) 0.9 %
3-10 SYRINGE (ML) INJECTION AS NEEDED
Status: DISCONTINUED | OUTPATIENT
Start: 2023-02-27 | End: 2023-02-27 | Stop reason: HOSPADM

## 2023-02-27 RX ORDER — SODIUM CHLORIDE, SODIUM LACTATE, POTASSIUM CHLORIDE, CALCIUM CHLORIDE 600; 310; 30; 20 MG/100ML; MG/100ML; MG/100ML; MG/100ML
100 INJECTION, SOLUTION INTRAVENOUS CONTINUOUS
Status: DISCONTINUED | OUTPATIENT
Start: 2023-02-27 | End: 2023-02-27 | Stop reason: HOSPADM

## 2023-02-27 RX ORDER — LABETALOL HYDROCHLORIDE 5 MG/ML
5 INJECTION, SOLUTION INTRAVENOUS
Status: DISCONTINUED | OUTPATIENT
Start: 2023-02-27 | End: 2023-02-27 | Stop reason: HOSPADM

## 2023-02-27 RX ORDER — LIDOCAINE HYDROCHLORIDE 20 MG/ML
INJECTION, SOLUTION EPIDURAL; INFILTRATION; INTRACAUDAL; PERINEURAL AS NEEDED
Status: DISCONTINUED | OUTPATIENT
Start: 2023-02-27 | End: 2023-02-27 | Stop reason: SURG

## 2023-02-27 RX ORDER — SODIUM CHLORIDE, SODIUM LACTATE, POTASSIUM CHLORIDE, CALCIUM CHLORIDE 600; 310; 30; 20 MG/100ML; MG/100ML; MG/100ML; MG/100ML
1000 INJECTION, SOLUTION INTRAVENOUS CONTINUOUS
Status: DISCONTINUED | OUTPATIENT
Start: 2023-02-27 | End: 2023-02-27 | Stop reason: HOSPADM

## 2023-02-27 RX ORDER — OXYMETAZOLINE HYDROCHLORIDE 0.05 G/100ML
2 SPRAY NASAL ONCE
Status: COMPLETED | OUTPATIENT
Start: 2023-02-27 | End: 2023-02-27

## 2023-02-27 RX ORDER — PROPOFOL 10 MG/ML
INJECTION, EMULSION INTRAVENOUS AS NEEDED
Status: DISCONTINUED | OUTPATIENT
Start: 2023-02-27 | End: 2023-02-27 | Stop reason: SURG

## 2023-02-27 RX ORDER — SODIUM CHLORIDE 0.9 % (FLUSH) 0.9 %
3 SYRINGE (ML) INJECTION AS NEEDED
Status: DISCONTINUED | OUTPATIENT
Start: 2023-02-27 | End: 2023-02-27 | Stop reason: HOSPADM

## 2023-02-27 RX ORDER — FLUMAZENIL 0.1 MG/ML
0.2 INJECTION INTRAVENOUS AS NEEDED
Status: DISCONTINUED | OUTPATIENT
Start: 2023-02-27 | End: 2023-02-27 | Stop reason: HOSPADM

## 2023-02-27 RX ORDER — OXYCODONE AND ACETAMINOPHEN 10; 325 MG/1; MG/1
1 TABLET ORAL ONCE AS NEEDED
Status: DISCONTINUED | OUTPATIENT
Start: 2023-02-27 | End: 2023-02-27 | Stop reason: HOSPADM

## 2023-02-27 RX ORDER — SODIUM CHLORIDE 0.9 % (FLUSH) 0.9 %
3 SYRINGE (ML) INJECTION EVERY 12 HOURS SCHEDULED
Status: DISCONTINUED | OUTPATIENT
Start: 2023-02-27 | End: 2023-02-27 | Stop reason: HOSPADM

## 2023-02-27 RX ORDER — NALOXONE HCL 0.4 MG/ML
0.4 VIAL (ML) INJECTION AS NEEDED
Status: DISCONTINUED | OUTPATIENT
Start: 2023-02-27 | End: 2023-02-27 | Stop reason: HOSPADM

## 2023-02-27 RX ORDER — LIDOCAINE HYDROCHLORIDE 10 MG/ML
0.5 INJECTION, SOLUTION EPIDURAL; INFILTRATION; INTRACAUDAL; PERINEURAL ONCE AS NEEDED
Status: COMPLETED | OUTPATIENT
Start: 2023-02-27 | End: 2023-02-27

## 2023-02-27 RX ORDER — ONDANSETRON 2 MG/ML
4 INJECTION INTRAMUSCULAR; INTRAVENOUS ONCE AS NEEDED
Status: DISCONTINUED | OUTPATIENT
Start: 2023-02-27 | End: 2023-02-27 | Stop reason: HOSPADM

## 2023-02-27 RX ORDER — ONDANSETRON 4 MG/1
4 TABLET, FILM COATED ORAL ONCE AS NEEDED
Status: DISCONTINUED | OUTPATIENT
Start: 2023-02-27 | End: 2023-02-27 | Stop reason: HOSPADM

## 2023-02-27 RX ADMIN — LIDOCAINE HYDROCHLORIDE 100 MG: 20 INJECTION, SOLUTION EPIDURAL; INFILTRATION; INTRACAUDAL; PERINEURAL at 10:36

## 2023-02-27 RX ADMIN — SODIUM CHLORIDE, POTASSIUM CHLORIDE, SODIUM LACTATE AND CALCIUM CHLORIDE 100 ML/HR: 600; 310; 30; 20 INJECTION, SOLUTION INTRAVENOUS at 09:52

## 2023-02-27 RX ADMIN — OXYMETAZOLINE HCL 2 SPRAY: 0.05 SPRAY NASAL at 09:52

## 2023-02-27 RX ADMIN — LIDOCAINE HYDROCHLORIDE 0.5 ML: 10 INJECTION, SOLUTION EPIDURAL; INFILTRATION; INTRACAUDAL; PERINEURAL at 09:52

## 2023-02-27 RX ADMIN — PROPOFOL 80 MG: 10 INJECTION, EMULSION INTRAVENOUS at 10:36

## 2023-02-27 NOTE — ANESTHESIA PREPROCEDURE EVALUATION
Anesthesia Evaluation     Patient summary reviewed   history of anesthetic complications: PONV prolonged sedation  NPO Solid Status: > 8 hours             Airway   Mallampati: II  TM distance: >3 FB  Neck ROM: full  Dental    (+) poor dentition        Pulmonary    (+) sleep apnea (scheduling sleep study/CN XII stimulator ),   (-) COPD, asthma, not a smoker  Cardiovascular   Exercise tolerance: good (4-7 METS)    ECG reviewed    (+) hypertension, valvular problems/murmurs murmur, CAD, CABG, dysrhythmias Atrial Fib, hyperlipidemia,  carotid artery disease  (-) pacemaker, past MI, angina, CHF, cardiac stents      Neuro/Psych  (+) seizures well controlled, CVA,    (-) TIA  GI/Hepatic/Renal/Endo    (+) obesity,   liver disease fatty liver disease, diabetes mellitus using insulin,   (-) GERD, no renal disease    Musculoskeletal     Abdominal   (+) obese,    Substance History      OB/GYN          Other   arthritis,    history of cancer                      Anesthesia Plan    ASA 3     MAC     intravenous induction     Anesthetic plan, risks, benefits, and alternatives have been provided, discussed and informed consent has been obtained with: patient.        CODE STATUS:

## 2023-02-27 NOTE — ANESTHESIA POSTPROCEDURE EVALUATION
"Patient: Carlos A Boyer Jr.    Procedure Summary     Date: 02/27/23 Room / Location:  [facility] OR 03 /  [facility] OR    Anesthesia Start: 1028 Anesthesia Stop: 1052    Procedure: Videosleep endoscopy Diagnosis:       Sleep apnea, unspecified type      Daytime somnolence      Snoring      (Sleep apnea, unspecified type [G47.30])      (Daytime somnolence [R40.0])      (Snoring [R06.83])    Surgeons: Mayank Ibarra MD Provider: Jin Mosqueda CRNA    Anesthesia Type: MAC ASA Status: 3          Anesthesia Type: MAC    Vitals  No vitals data found for the desired time range.          Post Anesthesia Care and Evaluation    Patient location during evaluation: PHASE II  Patient participation: complete - patient participated  Level of consciousness: awake  Pain management: adequate    Airway patency: patent  Anesthetic complications: No anesthetic complications    Cardiovascular status: acceptable  Respiratory status: acceptable  Hydration status: acceptable    Comments: /74 (BP Location: Left arm, Patient Position: Sitting)   Pulse 58   Temp 97 °F (36.1 °C) (Temporal)   Resp 16   Ht 180 cm (70.87\")   Wt 116 kg (256 lb 2.8 oz)   SpO2 95%   BMI 35.86 kg/m²         "

## 2023-03-06 NOTE — PROGRESS NOTES
Subjective    Mr. Boyer is 62 y.o. male    Chief Complaint: BPH    History of Present Illness  62-year-old male established patient follow-up after TURP on 1/23/23 for worsening LUTS after previous UroLift in 2017.  He was noted to have a markedly elevated bladder neck requiring placement of concomitant SP tube in order to perform CBI as I could only get a 2-way catheter in from below.  He states he is voiding with a much better stream and is overall pleased since his catheter removal.  He did have some hematuria after having to go back on his anticoagulation but this has now cleared.  His TURP chip pathology was benign.    The following portions of the patient's history were reviewed and updated as appropriate: allergies, current medications, past family history, past medical history, past social history, past surgical history and problem list.    Review of Systems      Current Outpatient Medications:   •  acetaminophen (TYLENOL) 500 MG tablet, Take 500 mg by mouth As Needed for Mild Pain. OTC, Disp: , Rfl:   •  albuterol (PROVENTIL HFA;VENTOLIN HFA) 108 (90 BASE) MCG/ACT inhaler, Inhale 2 puffs Every 6 (Six) Hours As Needed for wheezing., Disp: , Rfl:   •  Aspirin 81 MG capsule, Chew 81 mg Daily., Disp: , Rfl:   •  calcium polycarbophil (FIBERCON) 625 MG tablet, Take 625 mg by mouth As Needed., Disp: , Rfl:   •  carboxymethylcellulose (REFRESH PLUS) 0.5 % solution, Administer 1 drop to both eyes 4 (Four) Times a Day As Needed for Dry Eyes., Disp: , Rfl:   •  docusate sodium (Colace) 100 MG capsule, Take 1 capsule by mouth 2 (Two) Times a Day., Disp: 60 capsule, Rfl: 1  •  empagliflozin (JARDIANCE) 10 MG tablet tablet, Take 10 mg by mouth Daily., Disp: , Rfl:   •  ezetimibe (ZETIA) 10 MG tablet, Take 1 tablet by mouth Daily., Disp: , Rfl:   •  finasteride (PROSCAR) 5 MG tablet, Take 1 tablet by mouth Daily., Disp: 30 tablet, Rfl: 3  •  fluticasone (FLONASE) 50 MCG/ACT nasal spray, 2 sprays into the nostril(s) as  directed by provider Daily As Needed for Rhinitis or Allergies., Disp: , Rfl:   •  gabapentin (Neurontin) 300 MG capsule, Take 1 capsule by mouth Every Night. Please overnight., Disp: 30 capsule, Rfl: 2  •  guaiFENesin (MUCINEX) 600 MG 12 hr tablet, Take 1,200 mg by mouth As Needed for Congestion., Disp: , Rfl:   •  insulin aspart (novoLOG FLEXPEN) 100 UNIT/ML solution pen-injector sc pen, Inject 40 Units under the skin into the appropriate area as directed Every Morning. 40 units 3 times daily, a fourth dose mid day if needed per glucose, Disp: , Rfl:   •  insulin detemir (LEVEMIR) 100 UNIT/ML injection, Inject 100 Units under the skin into the appropriate area as directed Daily. 110AM 110 units PM, Disp: , Rfl:   •  isosorbide mononitrate (IMDUR) 120 MG 24 hr tablet, Take 1 tablet by mouth Daily., Disp: 90 tablet, Rfl: 3  •  lamoTRIgine (LaMICtal) 200 MG tablet, Take 1 tablet by mouth 2 (Two) Times a Day., Disp: 180 tablet, Rfl: 1  •  levETIRAcetam (KEPPRA) 500 MG tablet, Take 3 tablets every morning, take 4 tablets every night. (Patient taking differently: Take 500 mg by mouth. Take 3 tablets every morning,  take 4 tablets every night. Told to take DOS - 2/27/23, w/ sip of water.), Disp: 630 tablet, Rfl: 1  •  metFORMIN (GLUCOPHAGE) 1000 MG tablet, Take 1 tablet by mouth 2 (Two) Times a Day With Meals. HOLD x 48 hours post contrast. May resume 3/18/18, Disp: , Rfl:   •  metoprolol tartrate (LOPRESSOR) 100 MG tablet, Take 100 mg by mouth 2 (Two) Times a Day. Told to take the DOS-2/27/23, w/ sip of water., Disp: , Rfl:   •  Multiple Vitamin (MULTI VITAMIN PO), Take 1 tablet by mouth Daily., Disp: , Rfl:   •  nitroglycerin (NITROSTAT) 0.4 MG SL tablet, Place 0.4 mg under the tongue Every 5 (Five) Minutes As Needed for Chest Pain. Take no more than 3 doses in 15 minutes., Disp: , Rfl:   •  oxybutynin (DITROPAN) 5 MG tablet, Take 1 tablet by mouth Every 8 (Eight) Hours As Needed (bladder spasms)., Disp: 30 tablet,  Rfl: 1  •  pantoprazole (PROTONIX) 40 MG EC tablet, Take 40 mg by mouth 2 (Two) Times a Day., Disp: , Rfl:   •  psyllium (METAMUCIL) 58.6 % packet, Take 1 packet by mouth Daily As Needed (constipation)., Disp: , Rfl:   •  Rimegepant Sulfate (Nurtec) 75 MG tablet dispersible tablet, Take 1 tablet by mouth As Needed (Migraine)., Disp: 8 tablet, Rfl: 5  •  rosuvastatin (CRESTOR) 20 MG tablet, Take 20 mg by mouth Every Night., Disp: , Rfl:   •  sacubitril-valsartan (Entresto)  MG tablet, Take 1 tablet by mouth 2 (Two) Times a Day., Disp: 180 tablet, Rfl: 3  •  spironolactone (ALDACTONE) 25 MG tablet, Take 1 tablet by mouth Daily., Disp: 90 tablet, Rfl: 3    Past Medical History:   Diagnosis Date   • Arthritis    • Asthma    • Cancer (HCC)    • Carotid disease, bilateral (HCC)    • Chest pain    • Chronic diastolic congestive heart failure (HCC) 09/07/2019   • Chronic sinusitis    • Maribell bullosa    • Coronary artery disease involving native coronary artery of native heart with unstable angina pectoris (HCC) 01/17/2017   • Deviated septum    • Diabetes mellitus (HCC)    • Difficulty urinating    • Diverticulitis    • Enlarged prostate    • Fatty liver    • GERD (gastroesophageal reflux disease)    • [place] (hard of hearing)     Does have hearing aids   • Hyperlipidemia LDL goal <70 02/02/2017   • Hypertrophy of nasal turbinates    • Keratoderma    • Kidney stone    • Migraine    • Murmur, heart    • Myocardial infarction (HCC)    • Obesity    • Paroxysmal atrial fibrillation (HCC) 07/11/2019   • Personal history of COVID-19 07/2021   • PONV (postoperative nausea and vomiting)    • Primary hypertension 10/16/2016   • Psoriasis    • Seizures (HCC)    • Sinus congestion    • Skin cancer    • Sleep apnea     not using cpap   • SOB (shortness of breath)    • Stroke (HCC)    • UTI (urinary tract infection)        Past Surgical History:   Procedure Laterality Date   • CARDIAC CATHETERIZATION  01/2016    Dr. Broadbent;  widely patent previously placed stents in the left anterior descending and obstructive disease involving the diagonal branch which was treated medically   • CARDIAC CATHETERIZATION N/A 07/14/2017    Procedure: Left Heart Cath;  Surgeon: Wade Ramey MD;  Location:  [facility];  Service:    • CARDIAC CATHETERIZATION Left 10/15/2018    Procedure: Cardiac Catheterization/Vascular Study;  Surgeon: Wade Ramey MD;  Location:  [facility];  Service: Cardiology   • CARDIAC CATHETERIZATION  10/15/2018    Procedure: Functional Flow [place];  Surgeon: Wade Ramey MD;  Location:  [facility];  Service: Cardiology   • CARDIAC CATHETERIZATION N/A 10/15/2018    Procedure: Left ventriculography;  Surgeon: Wade Ramey MD;  Location:  [facility];  Service: Cardiology   • CARDIAC CATHETERIZATION Left 06/26/2019    Procedure: Cardiac Catheterization/Vascular Study VEL OK  HE WILL WAIT 1 YEAR FOR SHOULDER SURGERY ;  Surgeon: Wade Ramey MD;  Location:  [facility];  Service: Cardiology   • CARDIAC CATHETERIZATION Left 04/30/2021    Procedure: Coronary angiography;  Surgeon: Sahil Llamas MD;  Location:  [facility];  Service: Cardiology;  Laterality: Left;   • CARDIAC CATHETERIZATION N/A 04/30/2021    Procedure: Percutaneous Coronary Intervention;  Surgeon: Sahil Llamas MD;  Location:  [facility];  Service: Cardiology;  Laterality: N/A;   • CARDIAC CATHETERIZATION N/A 11/09/2022    Procedure: Left Heart Cath with SVGs;  Surgeon: Wade Ramey MD;  Location:  [facility];  Service: Cardiology;  Laterality: N/A;   • CHOLECYSTECTOMY     • CHOLECYSTECTOMY WITH INTRAOPERATIVE CHOLANGIOGRAM N/A 08/01/2018    Procedure: CHOLECYSTECTOMY LAPAROSCOPIC INTRAOPERATIVE CHOLANGIOGRAM;  Surgeon: Shane Ann MD;  Location: [facility] OR;  Service: General   • COLONOSCOPY N/A 07/14/2020     Procedure: COLONOSCOPY WITH ANESTHESIA;  Surgeon: Anupam Morales DO;  Location:  [facility];  Service: Gastroenterology;  Laterality: N/A;  pre: abdominal pain  post: diverticulosis  Vinayak Correia PA   • CORONARY ANGIOPLASTY     • CORONARY ARTERY BYPASS GRAFT N/A 07/06/2019    Procedure: CABG X2 WITH LIMA, LEFT LEG OVH, AND PLACEMENT OF LEFT FEMORAL ARTERIAL LINE;  Surgeon: Steven Tang MD;  Location:  [facility] OR;  Service: Cardiothoracic   • CORONARY STENT PLACEMENT      x 6   • CYSTOSCOPY TRANSURETHRAL RESECTION OF PROSTATE N/A 1/23/2023    Procedure: CYSTOSCOPY TRANSURETHRAL RESECTION OF PROSTATE;  Surgeon: Latrell Pope MD;  Location:  [facility] OR;  Service: Urology;  Laterality: N/A;   • ENDOSCOPIC FUNCTIONAL SINUS SURGERY (FESS) Bilateral 12/13/2017    Procedure: PROCEDURE PERFORMED:  Bilateral functional endoscopic anterior ethmoidectomy with bilateral middle meatal antrostomy Septoplasty Right kathia bullosa resection Bilateral inferior turbinate reduction via Coblation;  Surgeon: Mayank Ibarra MD;  Location:  [facility] OR;  Service:    • ENDOSCOPY N/A 07/30/2018    Procedure: ESOPHAGOGASTRODUODENOSCOPY WITH ANESTHESIA;  Surgeon: Benitez Mas MD;  Location:  [facility];  Service: Gastroenterology   • ENDOSCOPY N/A 07/14/2020    Procedure: ESOPHAGOGASTRODUODENOSCOPY WITH ANESTHESIA;  Surgeon: Anupam Morales DO;  Location: [facility];  Service: Gastroenterology;  Laterality: N/A;  pre: abdominal pain  post: esophagitis  Vinayak Correia PA   • HERNIA REPAIR      x2 inguinal area   • KIDNEY STONE SURGERY     • KNEE ARTHROSCOPY Right 03/01/2022    Procedure: RIGHT KNEE PARTIAL LATERAL MENISCECTOMY;  Surgeon: Pedro Pablo Song MD;  Location:  [facility] OR;  Service: Orthopedics;  Laterality: Right;   • KNEE SURGERY Right    • OTHER SURGICAL HISTORY      urolift   • PROSTATE SURGERY      Dr. Badillo - 2017   • ROTATOR CUFF REPAIR Right    • SLEEP ENDOSCOPY N/A  2023    Procedure: Videosleep endoscopy;  Surgeon: Mayank Ibarra MD;  Location: [facility] OR;  Service: ENT;  Laterality: N/A;   • THUMB AMPUTATION Left     partial   • TOE AMPUTATION Right     big       Social History     Socioeconomic History   • Marital status:    Tobacco Use   • Smoking status: Former     Packs/day: 1.50     Years: 18.00     Pack years: 27.00     Types: Cigarettes, Cigars     Start date:      Quit date:      Years since quittin.1   • Smokeless tobacco: Former     Types: Chew     Quit date:    Vaping Use   • Vaping Use: Never used   Substance and Sexual Activity   • Alcohol use: No     Comment: 0   • Drug use: No   • Sexual activity: Defer       Family History   Problem Relation Age of Onset   • Heart disease Father    • COPD Mother    • Hypertension Mother    • Asthma Mother    • No Known Problems Sister    • Colon cancer Paternal Uncle    • Prostate cancer Maternal Grandfather    • No Known Problems Sister    • Colon cancer Maternal Grandmother    • Colon polyps Maternal Grandmother        Objective    There were no vitals taken for this visit.    Physical Exam        Results for orders placed or performed during the hospital encounter of 23   POC Glucose Once    Specimen: Blood   Result Value Ref Range    Glucose 169 (H) 70 - 130 mg/dL   POC Glucose Once    Specimen: Blood   Result Value Ref Range    Glucose 159 (H) 70 - 130 mg/dL     Bladder Scan interpretation  Estimation of residual urine via abdominal ultrasound  Residual Urine: 23 ml  Indication: bpH  Position: Supine  Examination: Incremental scanning of the suprapubic area using 3 MHz transducer using copious amounts of acoustic gel.   Findings: An anechoic area was demonstrated which represented the bladder, with measurement of residual urine as noted. I inspected this myself. In that the residual urine was stable or insignificant, no treatment will be necessary at this time.       Assessment and  Plan    Diagnoses and all orders for this visit:    1. BPH with obstruction/lower urinary tract symptoms (Primary)    2. Urine frequency  -     PSA DIAGNOSTIC; Future      Doing well after TURP.  He would like to stop his prostate medications.  He will follow-up in 6 months with pre-clinic PSA or sooner as needed.      This document has been signed by JACINTA Pope MD on March 13, 2023 21:05 CDT

## 2023-03-06 NOTE — PROGRESS NOTES
YOB: 1960  Location: [facility]  Location Address: [address] [place] [address], [address] [place] [id number]  Location Phone: [phone]    Chief Complaint   Patient presents with   • Sleep Apnea       History of Present Illness  Carlos A Boyer Jr. is a 62 y.o. male.  Carlos A Boyer Jr. is here for follow up of ENT complaints. The patient has had problems with sleep apnea, snoring, and fatigue.  The symptoms are not localized to a particular location. The patient has had mild to moderate symptoms. The symptoms have been present for the last several years. There have been no identified factors that aggravate the symptoms. There have been no factors that have improved the symptoms.    YOB: 1960  Location: [facility]  Location Address: [address] [place] [address], [address] [place] [id number]  Location Phone: [phone]    Chief Complaint   Patient presents with   • Sleep Apnea       History of Present Illness  Carlos A Boyer Jr. is a 62 y.o. male.  Carlos A Boyer Jr. is here for evaluation of ENT complaints. The patient has had problems with sleep apnea, snoring, and fatigue. The symptoms are not localized to a particular location. The patient has had mild to moderate symptoms. The symptoms have been present for the last several years. There have been no identified factors that aggravate the symptoms. There have been no factors that have improved the symptoms.    He states that he is unable to use a CPAP due to the mask intolerance. He is interested in the inspire today. He has not had a recent sleep study.    [place]: 15    Past Medical History:   Diagnosis Date   • Arthritis    • Asthma    • Cancer ([facility])    • Carotid disease, bilateral ([facility])    • Chest pain    • Chronic diastolic congestive heart failure ([facility]) 2019   • Chronic sinusitis    • Maribell bullosa    • Coronary artery disease involving native coronary artery of native heart with unstable angina pectoris ([facility]) 2017    • Deviated septum    • Diabetes mellitus (HCC)    • Difficulty urinating    • Diverticulitis    • Enlarged prostate    • Fatty liver    • GERD (gastroesophageal reflux disease)    • [place] IRA (hard of hearing)     Does have hearing aids   • Hyperlipidemia LDL goal <70 02/02/2017   • Hypertrophy of nasal turbinates    • Keratoderma    • Kidney stone    • Migraine    • Murmur, heart    • Myocardial infarction (HCC)    • Obesity    • Paroxysmal atrial fibrillation (HCC) 07/11/2019   • Personal history of COVID-19 07/2021   • PONV (postoperative nausea and vomiting)    • Primary hypertension 10/16/2016   • Psoriasis    • Seizures (HCC)    • Sinus congestion    • Skin cancer    • Sleep apnea     not using cpap   • SOB (shortness of breath)    • Stroke (HCC)    • UTI (urinary tract infection)        Past Surgical History:   Procedure Laterality Date   • CARDIAC CATHETERIZATION  01/2016    Dr. Broadbent; widely patent previously placed stents in the left anterior descending and obstructive disease involving the diagonal branch which was treated medically   • CARDIAC CATHETERIZATION N/A 07/14/2017    Procedure: Left Heart Cath;  Surgeon: Wade Ramey MD;  Location:  [facility]ATH [facility];  Service:    • CARDIAC CATHETERIZATION Left 10/15/2018    Procedure: Cardiac Catheterization/Vascular Study;  Surgeon: Wade Ramey MD;  Location:  [facility];  Service: Cardiology   • CARDIAC CATHETERIZATION  10/15/2018    Procedure: Functional Flow [place];  Surgeon: Wade Ramey MD;  Location:  [facility];  Service: Cardiology   • CARDIAC CATHETERIZATION N/A 10/15/2018    Procedure: Left ventriculography;  Surgeon: Wade Ramey MD;  Location:  [facility];  Service: Cardiology   • CARDIAC CATHETERIZATION Left 06/26/2019    Procedure: Cardiac Catheterization/Vascular Study VEL [place]  HE WILL WAIT 1 YEAR FOR SHOULDER SURGERY ;  Surgeon: Wade Ramey MD;  Location:  [facility]ATH  INVASIVE [facility];  Service: Cardiology   • CARDIAC CATHETERIZATION Left 04/30/2021    Procedure: Coronary angiography;  Surgeon: Sahil Llamas MD;  Location:  [facility];  Service: Cardiology;  Laterality: Left;   • CARDIAC CATHETERIZATION N/A 04/30/2021    Procedure: Percutaneous Coronary Intervention;  Surgeon: Sahil Llamas MD;  Location:  [facility];  Service: Cardiology;  Laterality: N/A;   • CARDIAC CATHETERIZATION N/A 11/09/2022    Procedure: Left Heart Cath with SVGs;  Surgeon: Wade Ramey MD;  Location:  [facility];  Service: Cardiology;  Laterality: N/A;   • CHOLECYSTECTOMY     • CHOLECYSTECTOMY WITH INTRAOPERATIVE CHOLANGIOGRAM N/A 08/01/2018    Procedure: CHOLECYSTECTOMY LAPAROSCOPIC INTRAOPERATIVE CHOLANGIOGRAM;  Surgeon: Shane Ann MD;  Location: [facility] OR;  Service: General   • COLONOSCOPY N/A 07/14/2020    Procedure: COLONOSCOPY WITH ANESTHESIA;  Surgeon: Anupam Morales DO;  Location: [facility] ENDOSCOPY;  Service: Gastroenterology;  Laterality: N/A;  pre: abdominal pain  post: diverticulosis  Vinayak Correia PA   • CORONARY ANGIOPLASTY     • CORONARY ARTERY BYPASS GRAFT N/A 07/06/2019    Procedure: CABG X2 WITH LIMA, LEFT LEG OVH, AND PLACEMENT OF LEFT FEMORAL ARTERIAL LINE;  Surgeon: Steven Tang MD;  Location: [facility] OR;  Service: Cardiothoracic   • CORONARY STENT PLACEMENT      x 6   • CYSTOSCOPY TRANSURETHRAL RESECTION OF PROSTATE N/A 1/23/2023    Procedure: CYSTOSCOPY TRANSURETHRAL RESECTION OF PROSTATE;  Surgeon: Latrell Pope MD;  Location: [facility] OR;  Service: Urology;  Laterality: N/A;   • ENDOSCOPIC FUNCTIONAL SINUS SURGERY (FESS) Bilateral 12/13/2017    Procedure: PROCEDURE PERFORMED:  Bilateral functional endoscopic anterior ethmoidectomy with bilateral middle meatal antrostomy Septoplasty Right kathia bullosa resection Bilateral inferior turbinate reduction via Coblation;  Surgeon: Mayank Ibarra  MD;  Location: [facility] OR;  Service:    • ENDOSCOPY N/A 07/30/2018    Procedure: ESOPHAGOGASTRODUODENOSCOPY WITH ANESTHESIA;  Surgeon: Benitez Mas MD;  Location:  [facility] ENDOSCOPY;  Service: Gastroenterology   • ENDOSCOPY N/A 07/14/2020    Procedure: ESOPHAGOGASTRODUODENOSCOPY WITH ANESTHESIA;  Surgeon: Anupam Morales DO;  Location:  [facility] ENDOSCOPY;  Service: Gastroenterology;  Laterality: N/A;  pre: abdominal pain  post: esophagitis  Vinayak Correia PA   • HERNIA REPAIR      x2 inguinal area   • KIDNEY STONE SURGERY     • KNEE ARTHROSCOPY Right 03/01/2022    Procedure: RIGHT KNEE PARTIAL LATERAL MENISCECTOMY;  Surgeon: Pedro Pablo Song MD;  Location: [facility] OR;  Service: Orthopedics;  Laterality: Right;   • KNEE SURGERY Right    • OTHER SURGICAL HISTORY      urolift   • PROSTATE SURGERY      Dr. Badillo - 2017   • ROTATOR CUFF REPAIR Right    • SLEEP ENDOSCOPY N/A 2/27/2023    Procedure: Videosleep endoscopy;  Surgeon: Mayank Ibarra MD;  Location: [facility] OR;  Service: ENT;  Laterality: N/A;   • THUMB AMPUTATION Left     partial   • TOE AMPUTATION Right     big       Outpatient Medications Marked as Taking for the 3/7/23 encounter (Office Visit) with Mayank Ibarra MD   Medication Sig Dispense Refill   • acetaminophen (TYLENOL) 500 MG tablet Take 1 tablet by mouth As Needed for Mild Pain. OTC     • albuterol (PROVENTIL HFA;VENTOLIN HFA) 108 (90 BASE) MCG/ACT inhaler Inhale 2 puffs Every 6 (Six) Hours As Needed for Wheezing.     • Aspirin 81 MG capsule Chew 81 mg Daily.     • calcium polycarbophil (FIBERCON) 625 MG tablet Take 1 tablet by mouth As Needed.     • carboxymethylcellulose (REFRESH PLUS) 0.5 % solution Administer 1 drop to both eyes 4 (Four) Times a Day As Needed for Dry Eyes.     • docusate sodium (Colace) 100 MG capsule Take 1 capsule by mouth 2 (Two) Times a Day. 60 capsule 1   • empagliflozin (JARDIANCE) 10 MG tablet tablet Take 1 tablet by mouth Daily.     • ezetimibe  (ZETIA) 10 MG tablet Take 1 tablet by mouth Daily.     • finasteride (PROSCAR) 5 MG tablet Take 1 tablet by mouth Daily. 30 tablet 3   • fluticasone (FLONASE) 50 MCG/ACT nasal spray 2 sprays into the nostril(s) as directed by provider Daily As Needed for Rhinitis or Allergies.     • gabapentin (Neurontin) 300 MG capsule Take 1 capsule by mouth Every Night. Please overnight. 30 capsule 2   • guaiFENesin (MUCINEX) 600 MG 12 hr tablet Take 2 tablets by mouth As Needed for Congestion.     • insulin aspart (novoLOG FLEXPEN) 100 UNIT/ML solution pen-injector sc pen Inject 40 Units under the skin into the appropriate area as directed Every Morning. 40 units 3 times daily, a fourth dose mid day if needed per glucose     • insulin detemir (LEVEMIR) 100 UNIT/ML injection Inject 100 Units under the skin into the appropriate area as directed Daily. 110AM 110 units PM     • isosorbide mononitrate (IMDUR) 120 MG 24 hr tablet Take 1 tablet by mouth Daily. 90 tablet 3   • lamoTRIgine (LaMICtal) 200 MG tablet Take 1 tablet by mouth 2 (Two) Times a Day. 180 tablet 1   • levETIRAcetam (KEPPRA) 500 MG tablet Take 3 tablets every morning, take 4 tablets every night. (Patient taking differently: Take 1 tablet by mouth. Take 3 tablets every morning,   take 4 tablets every night.  Told to take DOS - 2/27/23, w/ sip of water.) 630 tablet 1   • metFORMIN (GLUCOPHAGE) 1000 MG tablet Take 1 tablet by mouth 2 (Two) Times a Day With Meals. HOLD x 48 hours post contrast. May resume 3/18/18     • metoprolol tartrate (LOPRESSOR) 100 MG tablet Take 1 tablet by mouth 2 (Two) Times a Day. Told to take the DOS-2/27/23, w/ sip of water.     • Multiple Vitamin (MULTI VITAMIN PO) Take 1 tablet by mouth Daily.     • nitroglycerin (NITROSTAT) 0.4 MG SL tablet Place 1 tablet under the tongue Every 5 (Five) Minutes As Needed for Chest Pain. Take no more than 3 doses in 15 minutes.     • oxybutynin (DITROPAN) 5 MG tablet Take 1 tablet by mouth Every 8 (Eight)  Hours As Needed (bladder spasms). 30 tablet 1   • pantoprazole (PROTONIX) 40 MG EC tablet Take 1 tablet by mouth 2 (Two) Times a Day.     • psyllium (METAMUCIL) 58.6 % packet Take 1 packet by mouth Daily As Needed (constipation).     • Rimegepant Sulfate (Nurtec) 75 MG tablet dispersible tablet Take 1 tablet by mouth As Needed (Migraine). 8 tablet 5   • rosuvastatin (CRESTOR) 20 MG tablet Take 1 tablet by mouth Every Night.     • sacubitril-valsartan (Entresto)  MG tablet Take 1 tablet by mouth 2 (Two) Times a Day. 180 tablet 3   • spironolactone (ALDACTONE) 25 MG tablet Take 1 tablet by mouth Daily. 90 tablet 3       Flagyl [metronidazole], Atorvastatin, and Ciprofloxacin    Family History   Problem Relation Age of Onset   • Heart disease Father    • COPD Mother    • Hypertension Mother    • Asthma Mother    • No Known Problems Sister    • Colon cancer Paternal Uncle    • Prostate cancer Maternal Grandfather    • No Known Problems Sister    • Colon cancer Maternal Grandmother    • Colon polyps Maternal Grandmother        Social History     Socioeconomic History   • Marital status:    Tobacco Use   • Smoking status: Former     Packs/day: 1.50     Years: 18.00     Pack years: 27.00     Types: Cigarettes, Cigars     Start date:      Quit date:      Years since quittin.1   • Smokeless tobacco: Former     Types: Chew     Quit date:    Vaping Use   • Vaping Use: Never used   Substance and Sexual Activity   • Alcohol use: No     Comment: 0   • Drug use: No   • Sexual activity: Defer       Review of Systems   Constitutional: Positive for fatigue.   HENT: Negative.    Respiratory: Positive for apnea.         Admits snoring   Genitourinary: Negative.    Neurological: Negative.        Vitals:    23 1040   BP: 105/58   Pulse: 63   Resp: 16   Temp: 97.3 °F (36.3 °C)       Body mass index is 35.53 kg/m².    Objective     Physical Exam  Vitals reviewed.   Constitutional:       Appearance:  Normal appearance. He is obese.   HENT:      Head: Normocephalic and atraumatic.      Right Ear: Hearing, tympanic membrane, ear canal and external ear normal.      Left Ear: Hearing, tympanic membrane, ear canal and external ear normal.      Nose: Nose normal.      Mouth/Throat:      Lips: Pink.      Mouth: Mucous membranes are moist.      Dentition: Abnormal dentition.      Comments: [place] II  Musculoskeletal:      Cervical back: Full passive range of motion without pain.   Neurological:      Mental Status: He is alert.   Psychiatric:         Behavior: Behavior is cooperative.         Assessment & Plan   Diagnoses and all orders for this visit:    1. Sleep apnea, unspecified type (Primary)    2. Daytime somnolence    3. Snoring      * Surgery not found *  No orders of the defined types were placed in this encounter.    No follow-ups on file.       There are no Patient Instructions on file for this visit.The patient is unable to use his CPAP due to mask intolerance. He has underwent a videosleep endoscopy. He is interested in the inspire.  He has not had a repeat sleep study. [facility] but he has not heard from them.     [place]: 7    Past Medical History:   Diagnosis Date   • Arthritis    • Asthma    • Cancer (HCC)    • Carotid disease, bilateral (HCC)    • Chest pain    • Chronic diastolic congestive heart failure (HCC) 09/07/2019   • Chronic sinusitis    • Mariblel bullosa    • Coronary artery disease involving native coronary artery of native heart with unstable angina pectoris (HCC) 01/17/2017   • Deviated septum    • Diabetes mellitus (HCC)    • Difficulty urinating    • Diverticulitis    • Enlarged prostate    • Fatty liver    • GERD (gastroesophageal reflux disease)    • [place] (hard of hearing)     Does have hearing aids   • Hyperlipidemia LDL goal <70 02/02/2017   • Hypertrophy of nasal turbinates    • Keratoderma    • Kidney stone    • Migraine    • Murmur, heart    •  Myocardial infarction (HCC)    • Obesity    • Paroxysmal atrial fibrillation (HCC) 07/11/2019   • Personal history of COVID-19 07/2021   • PONV (postoperative nausea and vomiting)    • Primary hypertension 10/16/2016   • Psoriasis    • Seizures (HCC)    • Sinus congestion    • Skin cancer    • Sleep apnea     not using cpap   • SOB (shortness of breath)    • Stroke (HCC)    • UTI (urinary tract infection)        Past Surgical History:   Procedure Laterality Date   • CARDIAC CATHETERIZATION  01/2016    Dr. Broadbent; widely patent previously placed stents in the left anterior descending and obstructive disease involving the diagonal branch which was treated medically   • CARDIAC CATHETERIZATION N/A 07/14/2017    Procedure: Left Heart Cath;  Surgeon: Wade Ramey MD;  Location:  [facility];  Service:    • CARDIAC CATHETERIZATION Left 10/15/2018    Procedure: Cardiac Catheterization/Vascular Study;  Surgeon: Wade Ramey MD;  Location:  [facility];  Service: Cardiology   • CARDIAC CATHETERIZATION  10/15/2018    Procedure: Functional Flow [place];  Surgeon: Wade Ramey MD;  Location:  [facility];  Service: Cardiology   • CARDIAC CATHETERIZATION N/A 10/15/2018    Procedure: Left ventriculography;  Surgeon: Wade Ramey MD;  Location:  [facility];  Service: Cardiology   • CARDIAC CATHETERIZATION Left 06/26/2019    Procedure: Cardiac Catheterization/Vascular Study VEL [place]  HE WILL WAIT 1 YEAR FOR SHOULDER SURGERY ;  Surgeon: Wade Ramey MD;  Location:  [facility];  Service: Cardiology   • CARDIAC CATHETERIZATION Left 04/30/2021    Procedure: Coronary angiography;  Surgeon: Sahil Llamas MD;  Location:  [facility];  Service: Cardiology;  Laterality: Left;   • CARDIAC CATHETERIZATION N/A 04/30/2021    Procedure: Percutaneous Coronary Intervention;  Surgeon: Sahil Llamas MD;  Location:  [facility] IN[facility]  [facility];  Service: Cardiology;  Laterality: N/A;   • CARDIAC CATHETERIZATION N/A 11/09/2022    Procedure: Left Heart Cath with SVGs;  Surgeon: Wade Ramey MD;  Location: [facility] CATH INVASIVE LOCATION;  Service: Cardiology;  Laterality: N/A;   • CHOLECYSTECTOMY     • CHOLECYSTECTOMY WITH INTRAOPERATIVE CHOLANGIOGRAM N/A 08/01/2018    Procedure: CHOLECYSTECTOMY LAPAROSCOPIC INTRAOPERATIVE CHOLANGIOGRAM;  Surgeon: Shane Ann MD;  Location: [facility] OR;  Service: General   • COLONOSCOPY N/A 07/14/2020    Procedure: COLONOSCOPY WITH ANESTHESIA;  Surgeon: Anupam Morales DO;  Location: [facility] ENDOSCOPY;  Service: Gastroenterology;  Laterality: N/A;  pre: abdominal pain  post: diverticulosis  Vinayak Correia PA   • CORONARY ANGIOPLASTY     • CORONARY ARTERY BYPASS GRAFT N/A 07/06/2019    Procedure: CABG X2 WITH LIMA, LEFT LEG OVH, AND PLACEMENT OF LEFT FEMORAL ARTERIAL LINE;  Surgeon: Steven Tang MD;  Location: [facility] OR;  Service: Cardiothoracic   • CORONARY STENT PLACEMENT      x 6   • CYSTOSCOPY TRANSURETHRAL RESECTION OF PROSTATE N/A 1/23/2023    Procedure: CYSTOSCOPY TRANSURETHRAL RESECTION OF PROSTATE;  Surgeon: Latrell Pope MD;  Location: [facility] OR;  Service: Urology;  Laterality: N/A;   • ENDOSCOPIC FUNCTIONAL SINUS SURGERY (FESS) Bilateral 12/13/2017    Procedure: PROCEDURE PERFORMED:  Bilateral functional endoscopic anterior ethmoidectomy with bilateral middle meatal antrostomy Septoplasty Right kathia bullosa resection Bilateral inferior turbinate reduction via Coblation;  Surgeon: Mayank Ibarra MD;  Location: [facility] OR;  Service:    • ENDOSCOPY N/A 07/30/2018    Procedure: ESOPHAGOGASTRODUODENOSCOPY WITH ANESTHESIA;  Surgeon: Benitez Mas MD;  Location: [facility] ENDOSCOPY;  Service: Gastroenterology   • ENDOSCOPY N/A 07/14/2020    Procedure: ESOPHAGOGASTRODUODENOSCOPY WITH ANESTHESIA;  Surgeon: Anupam Morales DO;  Location: [facility] ENDOSCOPY;  Service: Gastroenterology;   Laterality: N/A;  pre: abdominal pain  post: esophagitis  Vinayak Correia PA   • HERNIA REPAIR      x2 inguinal area   • KIDNEY STONE SURGERY     • KNEE ARTHROSCOPY Right 03/01/2022    Procedure: RIGHT KNEE PARTIAL LATERAL MENISCECTOMY;  Surgeon: Pedro Pablo Song MD;  Location:  [facility] OR;  Service: Orthopedics;  Laterality: Right;   • KNEE SURGERY Right    • OTHER SURGICAL HISTORY      urolift   • PROSTATE SURGERY      Dr. Badillo - 2017   • ROTATOR CUFF REPAIR Right    • SLEEP ENDOSCOPY N/A 2/27/2023    Procedure: Videosleep endoscopy;  Surgeon: Mayank Ibarra MD;  Location:  [facility] OR;  Service: ENT;  Laterality: N/A;   • THUMB AMPUTATION Left     partial   • TOE AMPUTATION Right     big       Outpatient Medications Marked as Taking for the 3/7/23 encounter (Office Visit) with Mayank Ibarra MD   Medication Sig Dispense Refill   • acetaminophen (TYLENOL) 500 MG tablet Take 1 tablet by mouth As Needed for Mild Pain. OTC     • albuterol (PROVENTIL HFA;VENTOLIN HFA) 108 (90 BASE) MCG/ACT inhaler Inhale 2 puffs Every 6 (Six) Hours As Needed for Wheezing.     • Aspirin 81 MG capsule Chew 81 mg Daily.     • calcium polycarbophil (FIBERCON) 625 MG tablet Take 1 tablet by mouth As Needed.     • carboxymethylcellulose (REFRESH PLUS) 0.5 % solution Administer 1 drop to both eyes 4 (Four) Times a Day As Needed for Dry Eyes.     • docusate sodium (Colace) 100 MG capsule Take 1 capsule by mouth 2 (Two) Times a Day. 60 capsule 1   • empagliflozin (JARDIANCE) 10 MG tablet tablet Take 1 tablet by mouth Daily.     • ezetimibe (ZETIA) 10 MG tablet Take 1 tablet by mouth Daily.     • finasteride (PROSCAR) 5 MG tablet Take 1 tablet by mouth Daily. 30 tablet 3   • fluticasone (FLONASE) 50 MCG/ACT nasal spray 2 sprays into the nostril(s) as directed by provider Daily As Needed for Rhinitis or Allergies.     • gabapentin (Neurontin) 300 MG capsule Take 1 capsule by mouth Every Night. Please overnight. 30 capsule 2    • guaiFENesin (MUCINEX) 600 MG 12 hr tablet Take 2 tablets by mouth As Needed for Congestion.     • insulin aspart (novoLOG FLEXPEN) 100 UNIT/ML solution pen-injector sc pen Inject 40 Units under the skin into the appropriate area as directed Every Morning. 40 units 3 times daily, a fourth dose mid day if needed per glucose     • insulin detemir (LEVEMIR) 100 UNIT/ML injection Inject 100 Units under the skin into the appropriate area as directed Daily. 110AM 110 units PM     • isosorbide mononitrate (IMDUR) 120 MG 24 hr tablet Take 1 tablet by mouth Daily. 90 tablet 3   • lamoTRIgine (LaMICtal) 200 MG tablet Take 1 tablet by mouth 2 (Two) Times a Day. 180 tablet 1   • levETIRAcetam (KEPPRA) 500 MG tablet Take 3 tablets every morning, take 4 tablets every night. (Patient taking differently: Take 1 tablet by mouth. Take 3 tablets every morning,   take 4 tablets every night.  Told to take DOS - 2/27/23, w/ sip of water.) 630 tablet 1   • metFORMIN (GLUCOPHAGE) 1000 MG tablet Take 1 tablet by mouth 2 (Two) Times a Day With Meals. HOLD x 48 hours post contrast. May resume 3/18/18     • metoprolol tartrate (LOPRESSOR) 100 MG tablet Take 1 tablet by mouth 2 (Two) Times a Day. Told to take the DOS-2/27/23, w/ sip of water.     • Multiple Vitamin (MULTI VITAMIN PO) Take 1 tablet by mouth Daily.     • nitroglycerin (NITROSTAT) 0.4 MG SL tablet Place 1 tablet under the tongue Every 5 (Five) Minutes As Needed for Chest Pain. Take no more than 3 doses in 15 minutes.     • oxybutynin (DITROPAN) 5 MG tablet Take 1 tablet by mouth Every 8 (Eight) Hours As Needed (bladder spasms). 30 tablet 1   • pantoprazole (PROTONIX) 40 MG EC tablet Take 1 tablet by mouth 2 (Two) Times a Day.     • psyllium (METAMUCIL) 58.6 % packet Take 1 packet by mouth Daily As Needed (constipation).     • Rimegepant Sulfate (Nurtec) 75 MG tablet dispersible tablet Take 1 tablet by mouth As Needed (Migraine). 8 tablet 5   • rosuvastatin (CRESTOR) 20 MG  tablet Take 1 tablet by mouth Every Night.     • sacubitril-valsartan (Entresto)  MG tablet Take 1 tablet by mouth 2 (Two) Times a Day. 180 tablet 3   • spironolactone (ALDACTONE) 25 MG tablet Take 1 tablet by mouth Daily. 90 tablet 3       Flagyl [metronidazole], Atorvastatin, and Ciprofloxacin    Family History   Problem Relation Age of Onset   • Heart disease Father    • COPD Mother    • Hypertension Mother    • Asthma Mother    • No Known Problems Sister    • Colon cancer Paternal Uncle    • Prostate cancer Maternal Grandfather    • No Known Problems Sister    • Colon cancer Maternal Grandmother    • Colon polyps Maternal Grandmother        Social History     Socioeconomic History   • Marital status:    Tobacco Use   • Smoking status: Former     Packs/day: 1.50     Years: 18.00     Pack years: 27.00     Types: Cigarettes, Cigars     Start date:      Quit date:      Years since quittin.1   • Smokeless tobacco: Former     Types: Chew     Quit date:    Vaping Use   • Vaping Use: Never used   Substance and Sexual Activity   • Alcohol use: No     Comment: 0   • Drug use: No   • Sexual activity: Defer       Review of Systems   Constitutional: Positive for fatigue.   HENT:        Admits snoring   Eyes: Negative.    Respiratory: Positive for apnea.    Genitourinary: Negative.    Neurological: Negative.        Vitals:    23 1040   BP: 105/58   Pulse: 63   Resp: 16   Temp: 97.3 °F (36.3 °C)       Body mass index is 35.53 kg/m².    Objective     Physical Exam  Vitals reviewed.   Constitutional:       Appearance: Normal appearance. He is obese.   HENT:      Head: Normocephalic and atraumatic.      Right Ear: Hearing, tympanic membrane, ear canal and external ear normal.      Left Ear: Hearing, tympanic membrane, ear canal and external ear normal.      Nose: Nose normal.      Mouth/Throat:      Lips: Pink.      Mouth: Mucous membranes are moist.      Dentition: Abnormal dentition.       Comments: Ryan URIBE  Musculoskeletal:      Cervical back: Full passive range of motion without pain.   Neurological:      Mental Status: He is alert.   Psychiatric:         Behavior: Behavior is cooperative.         Assessment & Plan   Diagnoses and all orders for this visit:    1. Sleep apnea, unspecified type (Primary)    2. Daytime somnolence    3. Snoring      * Surgery not found *  No orders of the defined types were placed in this encounter.    Will obtain sleep study  Call with any new/worsening problems or concerns  Dr. Ibarra examined and discussed care with patient and agrees with treatment plan.     No follow-ups on file.       There are no Patient Instructions on file for this visit.

## 2023-03-07 ENCOUNTER — TELEPHONE (OUTPATIENT)
Dept: OTOLARYNGOLOGY | Facility: CLINIC | Age: 63
End: 2023-03-07
Payer: OTHER GOVERNMENT

## 2023-03-07 ENCOUNTER — OFFICE VISIT (OUTPATIENT)
Dept: OTOLARYNGOLOGY | Facility: CLINIC | Age: 63
End: 2023-03-07
Payer: MEDICARE

## 2023-03-07 VITALS
BODY MASS INDEX: 35.49 KG/M2 | SYSTOLIC BLOOD PRESSURE: 105 MMHG | HEART RATE: 63 BPM | WEIGHT: 262 LBS | TEMPERATURE: 97.3 F | DIASTOLIC BLOOD PRESSURE: 58 MMHG | RESPIRATION RATE: 16 BRPM | HEIGHT: 72 IN

## 2023-03-07 DIAGNOSIS — R06.83 SNORING: ICD-10-CM

## 2023-03-07 DIAGNOSIS — R40.0 DAYTIME SOMNOLENCE: ICD-10-CM

## 2023-03-07 DIAGNOSIS — G47.30 SLEEP APNEA, UNSPECIFIED TYPE: Primary | ICD-10-CM

## 2023-03-07 PROCEDURE — 99213 OFFICE O/P EST LOW 20 MIN: CPT | Performed by: NURSE PRACTITIONER

## 2023-03-07 RX ORDER — AMOXICILLIN 500 MG/1
500 CAPSULE ORAL DAILY
Qty: 30 CAPSULE | Refills: 0 | Status: SHIPPED | OUTPATIENT
Start: 2023-03-07 | End: 2023-04-06

## 2023-03-07 NOTE — PATIENT INSTRUCTIONS
Will evaluate after sleep study has been repeated  amoxicillin and nasal ointment as directed  Call with any new/worsening problems or concerns    CONTACT INFORMATION:  The main office phone number is [phone]. For emergencies after hours and on weekends, this number will convert over to our answering service and the on call provider will answer. Please try to keep non emergent phone calls/ questions to office hours 9am-5pm Monday through Friday.      [facility]  As an alternative, you can sign up and use the Epic MyChart system for more direct and quicker access for non emergent questions/ problems.  [employer] allows you to send messages to your doctor, view your test results, renew your prescriptions, schedule appointments, and more. To sign up, go to kabuku and click on the Sign Up Now link in the New User? box. Enter your [facility] Activation Code exactly as it appears below along with the last four digits of your Social Security Number and your Date of Birth () to complete the sign-up process. If you do not sign up before the expiration date, you must request a new code.     [facility] Activation Code: Activation code not generated  Current [facility] Status: Active     If you have questions, you can email [email or web address] or call [phone] to talk to our [facility] staff. Remember, [facility] is NOT to be used for urgent needs. For medical emergencies, dial 911.     IF YOU SMOKE OR USE TOBACCO PLEASE READ THE FOLLOWING:  Why is smoking bad for me?  Smoking increases the risk of heart disease, lung disease, vascular disease, stroke, and cancer. If you smoke, STOP!        IF YOU SMOKE OR USE TOBACCO PLEASE READ THE FOLLOWING:  Why is smoking bad for me?  Smoking increases the risk of heart disease, lung disease, vascular disease, stroke, and cancer. If you smoke, STOP!     For more information:  Quit Now [place]  -QUIT-NOW  https://[email or web address].quitlogix.org/en-US/

## 2023-03-07 NOTE — PROGRESS NOTES
YOB: 1960  Location: [facility]  Location Address: [address] [place] [address], [facility], [address] [place] [address]  Location Phone: [phone]    Chief Complaint   Patient presents with   • Sleep Apnea       History of Present Illness  Carlos A Boyer Jr. is a 62 y.o. male.  Carlos A Boyer Jr. is here for evaluation of ENT complaints. The patient has had problems with sleep apnea, snoring, and fatigue. The symptoms are not localized to a particular location. The patient has had mild to moderate symptoms. The symptoms have been present for the last several years. There have been no identified factors that aggravate the symptoms. There have been no factors that have improved the symptoms.    He states that he is unable to use a CPAP due to the mask intolerance. He is interested in the inspire today. He has not had a recent sleep [facility] as [facility] and he has not been contacted. He has underwent a video sleep endoscopy.    Winston: 9     Past Medical History:   Diagnosis Date   • Arthritis    • Asthma    • Cancer (HCC)    • Carotid disease, bilateral (HCC)    • Chest pain    • Chronic diastolic congestive heart failure (HCC) 2019   • Chronic sinusitis    • Maribell bullosa    • Coronary artery disease involving native coronary artery of native heart with unstable angina pectoris (HCC) 2017   • Deviated septum    • Diabetes mellitus (HCC)    • Difficulty urinating    • Diverticulitis    • Enlarged prostate    • Fatty liver    • GERD (gastroesophageal reflux disease)    • Platinum (hard of hearing)     Does have hearing aids   • Hyperlipidemia LDL goal <70 2017   • Hypertrophy of nasal turbinates    • Keratoderma    • Kidney stone    • Migraine    • Murmur, heart    • Myocardial infarction (HCC)    • Obesity    • Paroxysmal atrial fibrillation (HCC) 2019   • Personal history of COVID-19 2021   • PONV (postoperative nausea and vomiting)    • Primary  hypertension 10/16/2016   • Psoriasis    • Seizures (HCC)    • Sinus congestion    • Skin cancer    • Sleep apnea     not using cpap   • SOB (shortness of breath)    • Stroke (HCC)    • UTI (urinary tract infection)        Past Surgical History:   Procedure Laterality Date   • CARDIAC CATHETERIZATION  01/2016    Dr. Broadbent; widely patent previously placed stents in the left anterior descending and obstructive disease involving the diagonal branch which was treated medically   • CARDIAC CATHETERIZATION N/A 07/14/2017    Procedure: Left Heart Cath;  Surgeon: Wade Ramey MD;  Location:  [facility];  Service:    • CARDIAC CATHETERIZATION Left 10/15/2018    Procedure: Cardiac Catheterization/Vascular Study;  Surgeon: Wade Ramey MD;  Location:  [facility];  Service: Cardiology   • CARDIAC CATHETERIZATION  10/15/2018    Procedure: Functional Flow [place];  Surgeon: Wade Ramey MD;  Location:  [facility];  Service: Cardiology   • CARDIAC CATHETERIZATION N/A 10/15/2018    Procedure: Left ventriculography;  Surgeon: Wade Ramey MD;  [facility];  Service: Cardiology   • CARDIAC CATHETERIZATION Left 06/26/2019    Procedure: Cardiac Catheterization/Vascular Study VEL OK  HE WILL WAIT 1 YEAR FOR SHOULDER SURGERY ;  Surgeon: Wade Ramey MD;  Location:  [facility];  Service: Cardiology   • CARDIAC CATHETERIZATION Left 04/30/2021    Procedure: Coronary angiography;  Surgeon: Sahil Llamas MD;  Location:  [facility];  Service: Cardiology;  Laterality: Left;   • CARDIAC CATHETERIZATION N/A 04/30/2021    Procedure: Percutaneous Coronary Intervention;  Surgeon: Sahil Llamas MD;  Location:  [facility];  Service: Cardiology;  Laterality: N/A;   • CARDIAC CATHETERIZATION N/A 11/09/2022    Procedure: Left Heart Cath with SVGs;  Surgeon: Wade Ramey MD;  Location:  [facility];  Service: Cardiology;  Laterality: N/A;   • CHOLECYSTECTOMY     • CHOLECYSTECTOMY WITH INTRAOPERATIVE CHOLANGIOGRAM N/A 08/01/2018    Procedure: CHOLECYSTECTOMY LAPAROSCOPIC INTRAOPERATIVE CHOLANGIOGRAM;  Surgeon: Shane Ann MD;  Location: [facility] OR;  Service: General   • COLONOSCOPY N/A 07/14/2020    Procedure: COLONOSCOPY WITH ANESTHESIA;  Surgeon: Anupam Morales DO;  Location: [facility] ENDOSCOPY;  Service: Gastroenterology;  Laterality: N/A;  pre: abdominal pain  post: diverticulosis  Vinayak Correia PA   • CORONARY ANGIOPLASTY     • CORONARY ARTERY BYPASS GRAFT N/A 07/06/2019    Procedure: CABG X2 WITH LIMA, LEFT LEG OVH, AND PLACEMENT OF LEFT FEMORAL ARTERIAL LINE;  Surgeon: Steven Tang MD;  Location: [facility] OR;  Service: Cardiothoracic   • CORONARY STENT PLACEMENT      x 6   • CYSTOSCOPY TRANSURETHRAL RESECTION OF PROSTATE N/A 1/23/2023    Procedure: CYSTOSCOPY TRANSURETHRAL RESECTION OF PROSTATE;  Surgeon: Latrell Pope MD;  Location: [facility] OR;  Service: Urology;  Laterality: N/A;   • ENDOSCOPIC FUNCTIONAL SINUS SURGERY (FESS) Bilateral 12/13/2017    Procedure: PROCEDURE PERFORMED:  Bilateral functional endoscopic anterior ethmoidectomy with bilateral middle meatal antrostomy Septoplasty Right kathia bullosa resection Bilateral inferior turbinate reduction via Coblation;  Surgeon: Mayank Ibarra MD;  Location: [facility] OR;  Service:    • ENDOSCOPY N/A 07/30/2018    Procedure: ESOPHAGOGASTRODUODENOSCOPY WITH ANESTHESIA;  Surgeon: Benitez Mas MD;  Location: [facility] ENDOSCOPY;  Service: Gastroenterology   • ENDOSCOPY N/A 07/14/2020    Procedure: ESOPHAGOGASTRODUODENOSCOPY WITH ANESTHESIA;  Surgeon: Anupam Morales DO;  Location: [facility] ENDOSCOPY;  Service: Gastroenterology;  Laterality: N/A;  pre: abdominal pain  post: esophagitis  Vinayak Correia PA   • HERNIA REPAIR      x2 inguinal area   • KIDNEY STONE SURGERY     • KNEE ARTHROSCOPY Right 03/01/2022     Procedure: RIGHT KNEE PARTIAL LATERAL MENISCECTOMY;  Surgeon: Pedro Pablo Song MD;  Location:  [facility] OR;  Service: Orthopedics;  Laterality: Right;   • KNEE SURGERY Right    • OTHER SURGICAL HISTORY      urolift   • PROSTATE SURGERY      Dr. Badillo - 2017   • ROTATOR CUFF REPAIR Right    • SLEEP ENDOSCOPY N/A 2/27/2023    Procedure: Videosleep endoscopy;  Surgeon: Mayank Ibarra MD;  Location:  [facility] OR;  Service: ENT;  Laterality: N/A;   • THUMB AMPUTATION Left     partial   • TOE AMPUTATION Right     big       Outpatient Medications Marked as Taking for the 3/7/23 encounter (Office Visit) with Mayank Ibarra MD   Medication Sig Dispense Refill   • acetaminophen (TYLENOL) 500 MG tablet Take 1 tablet by mouth As Needed for Mild Pain. OTC     • albuterol (PROVENTIL HFA;VENTOLIN HFA) 108 (90 BASE) MCG/ACT inhaler Inhale 2 puffs Every 6 (Six) Hours As Needed for Wheezing.     • Aspirin 81 MG capsule Chew 81 mg Daily.     • calcium polycarbophil (FIBERCON) 625 MG tablet Take 1 tablet by mouth As Needed.     • carboxymethylcellulose (REFRESH PLUS) 0.5 % solution Administer 1 drop to both eyes 4 (Four) Times a Day As Needed for Dry Eyes.     • docusate sodium (Colace) 100 MG capsule Take 1 capsule by mouth 2 (Two) Times a Day. 60 capsule 1   • empagliflozin (JARDIANCE) 10 MG tablet tablet Take 1 tablet by mouth Daily.     • ezetimibe (ZETIA) 10 MG tablet Take 1 tablet by mouth Daily.     • finasteride (PROSCAR) 5 MG tablet Take 1 tablet by mouth Daily. 30 tablet 3   • fluticasone (FLONASE) 50 MCG/ACT nasal spray 2 sprays into the nostril(s) as directed by provider Daily As Needed for Rhinitis or Allergies.     • gabapentin (Neurontin) 300 MG capsule Take 1 capsule by mouth Every Night. Please overnight. 30 capsule 2   • guaiFENesin (MUCINEX) 600 MG 12 hr tablet Take 2 tablets by mouth As Needed for Congestion.     • insulin aspart (novoLOG FLEXPEN) 100 UNIT/ML solution pen-injector sc pen Inject 40 Units  under the skin into the appropriate area as directed Every Morning. 40 units 3 times daily, a fourth dose mid day if needed per glucose     • insulin detemir (LEVEMIR) 100 UNIT/ML injection Inject 100 Units under the skin into the appropriate area as directed Daily. 110AM 110 units PM     • isosorbide mononitrate (IMDUR) 120 MG 24 hr tablet Take 1 tablet by mouth Daily. 90 tablet 3   • lamoTRIgine (LaMICtal) 200 MG tablet Take 1 tablet by mouth 2 (Two) Times a Day. 180 tablet 1   • levETIRAcetam (KEPPRA) 500 MG tablet Take 3 tablets every morning, take 4 tablets every night. (Patient taking differently: Take 1 tablet by mouth. Take 3 tablets every morning,   take 4 tablets every night.  Told to take DOS - 2/27/23, w/ sip of water.) 630 tablet 1   • metFORMIN (GLUCOPHAGE) 1000 MG tablet Take 1 tablet by mouth 2 (Two) Times a Day With Meals. HOLD x 48 hours post contrast. May resume 3/18/18     • metoprolol tartrate (LOPRESSOR) 100 MG tablet Take 1 tablet by mouth 2 (Two) Times a Day. Told to take the DOS-2/27/23, w/ sip of water.     • Multiple Vitamin (MULTI VITAMIN PO) Take 1 tablet by mouth Daily.     • nitroglycerin (NITROSTAT) 0.4 MG SL tablet Place 1 tablet under the tongue Every 5 (Five) Minutes As Needed for Chest Pain. Take no more than 3 doses in 15 minutes.     • oxybutynin (DITROPAN) 5 MG tablet Take 1 tablet by mouth Every 8 (Eight) Hours As Needed (bladder spasms). 30 tablet 1   • pantoprazole (PROTONIX) 40 MG EC tablet Take 1 tablet by mouth 2 (Two) Times a Day.     • psyllium (METAMUCIL) 58.6 % packet Take 1 packet by mouth Daily As Needed (constipation).     • Rimegepant Sulfate (Nurtec) 75 MG tablet dispersible tablet Take 1 tablet by mouth As Needed (Migraine). 8 tablet 5   • rosuvastatin (CRESTOR) 20 MG tablet Take 1 tablet by mouth Every Night.     • sacubitril-valsartan (Entresto)  MG tablet Take 1 tablet by mouth 2 (Two) Times a Day. 180 tablet 3   • spironolactone (ALDACTONE) 25 MG  tablet Take 1 tablet by mouth Daily. 90 tablet 3       Flagyl [metronidazole], Atorvastatin, and Ciprofloxacin    Family History   Problem Relation Age of Onset   • Heart disease Father    • COPD Mother    • Hypertension Mother    • Asthma Mother    • No Known Problems Sister    • Colon cancer Paternal Uncle    • Prostate cancer Maternal Grandfather    • No Known Problems Sister    • Colon cancer Maternal Grandmother    • Colon polyps Maternal Grandmother        Social History     Socioeconomic History   • Marital status:    Tobacco Use   • Smoking status: Former     Packs/day: 1.50     Years: 18.00     Pack years: 27.00     Types: Cigarettes, Cigars     Start date:      Quit date:      Years since quittin.1   • Smokeless tobacco: Former     Types: Chew     Quit date:    Vaping Use   • Vaping Use: Never used   Substance and Sexual Activity   • Alcohol use: No     Comment: 0   • Drug use: No   • Sexual activity: Defer       Review of Systems   Constitutional: Positive for fatigue.   HENT:        Admits snoring   Eyes: Negative.    Respiratory: Positive for apnea.    Genitourinary: Negative.    Neurological: Negative.        Vitals:    23 1040   BP: 105/58   Pulse: 63   Resp: 16   Temp: 97.3 °F (36.3 °C)       Body mass index is 35.53 kg/m².    Objective     Physical Exam  Vitals reviewed.   Constitutional:       Appearance: Normal appearance. He is obese.   HENT:      Head: Normocephalic and atraumatic.      Right Ear: Hearing, tympanic membrane, ear canal and external ear normal.      Left Ear: Hearing, tympanic membrane, ear canal and external ear normal.      Nose: Nose normal.      Mouth/Throat:      Lips: Pink.      Mouth: Mucous membranes are moist.      Dentition: Abnormal dentition.      Pharynx: Oropharynx is clear. Uvula midline.      Comments: Davis II  Musculoskeletal:      Cervical back: Full passive range of motion without pain.   Neurological:      Mental Status: He is  alert.   Psychiatric:         Behavior: Behavior is cooperative.         Assessment & Plan   Diagnoses and all orders for this visit:    1. Sleep apnea, unspecified type (Primary)    2. Daytime somnolence    3. Snoring    Other orders  -     amoxicillin (AMOXIL) 500 MG capsule; Take 1 capsule by mouth Daily for 30 days.  Dispense: 30 capsule; Refill: 0  -     mupirocin (BACTROBAN) 2 % nasal ointment; into the nostril(s) as directed by provider 2 (Two) Times a Day for 14 days. Apply to the nose twice daily  Dispense: 3 g; Refill: 0      * Surgery not found *  No orders of the defined types were placed in this encounter.    Will evaluate after sleep study has been repeated  amoxicillin and nasal ointment as directed  Call with any new/worsening problems or concerns    Dr. Ibarra examined and discussed care with patient and agrees with treatment plan.     Return for Recheck after sleep study.       Patient Instructions   Will evaluate after sleep study has been repeated  amoxicillin and nasal ointment as directed  Call with any new/worsening problems or concerns    CONTACT INFORMATION:  The main office phone number is [phone]. For emergencies after hours and on weekends, this number will convert over to our answering service and the on call provider will answer. Please try to keep non emergent phone calls/ questions to office hours 9am-5pm Monday through Friday.      [employer]  As an alternative, you can sign up and use the Epic MyChart system for more direct and quicker access for non emergent questions/ problems.  [facility] [employer] allows you to send messages to your doctor, view your test results, renew your prescriptions, schedule appointments, and more. To sign up, go to TopVisible and click on the Sign Up Now link in the New User? box. Enter your [employer] Activation Code exactly as it appears below along with the last four digits of your Social Security Number and your Date of Birth () to  complete the sign-up process. If you do not sign up before the expiration date, you must request a new code.     [facility] Activation Code: Activation code not generated  Current [facility] Status: Active     If you have questions, you can email [email or web address] or call [phone] to talk to our [employer] staff. Remember, MyChart is NOT to be used for urgent needs. For medical emergencies, dial 911.     IF YOU SMOKE OR USE TOBACCO PLEASE READ THE FOLLOWING:  Why is smoking bad for me?  Smoking increases the risk of heart disease, lung disease, vascular disease, stroke, and cancer. If you smoke, STOP!        IF YOU SMOKE OR USE TOBACCO PLEASE READ THE FOLLOWING:  Why is smoking bad for me?  Smoking increases the risk of heart disease, lung disease, vascular disease, stroke, and cancer. If you smoke, STOP!     For more information:  Quit Now [place]  1-800-QUIT-NOW  https://[place].quitlogix.org/en-US/

## 2023-03-13 ENCOUNTER — OFFICE VISIT (OUTPATIENT)
Dept: UROLOGY | Facility: CLINIC | Age: 63
End: 2023-03-13
Payer: OTHER GOVERNMENT

## 2023-03-13 VITALS — WEIGHT: 262 LBS | BODY MASS INDEX: 35.49 KG/M2 | TEMPERATURE: 97.8 F | HEIGHT: 72 IN

## 2023-03-13 DIAGNOSIS — N13.8 BPH WITH OBSTRUCTION/LOWER URINARY TRACT SYMPTOMS: Primary | ICD-10-CM

## 2023-03-13 DIAGNOSIS — R35.0 URINE FREQUENCY: ICD-10-CM

## 2023-03-13 DIAGNOSIS — N40.1 BPH WITH OBSTRUCTION/LOWER URINARY TRACT SYMPTOMS: Primary | ICD-10-CM

## 2023-03-13 PROCEDURE — 99024 POSTOP FOLLOW-UP VISIT: CPT | Performed by: UROLOGY

## 2023-03-21 ENCOUNTER — TELEPHONE (OUTPATIENT)
Dept: OTOLARYNGOLOGY | Facility: CLINIC | Age: 63
End: 2023-03-21

## 2023-03-21 ENCOUNTER — HOSPITAL ENCOUNTER (OUTPATIENT)
Dept: SLEEP CENTER | Age: 63
Discharge: HOME | End: 2023-03-23
Payer: MEDICARE

## 2023-03-21 PROCEDURE — 95810 POLYSOM 6/> YRS 4/> PARAM: CPT

## 2023-03-21 NOTE — TELEPHONE ENCOUNTER
The [facility] received a fax that requires your attention. The document has been indexed to the patient’s chart for your review.      Reason for sending: [employer] CONFIRMATION NUMBER FOR EXAM SCHEDULING    Documents Description: EXT MED [id number]    Name of Sender: [employer]    Date Indexed: 03.21.23    Notes (if needed):

## 2023-03-22 NOTE — PROGRESS NOTES
[place] [address]  [place], [address]  Phone [phone] Fax [phone]     Sleep Study Technician Review    Patient Name:  Miguelina McfarlanesEssence Charley Nelson :   1960  Referring Provider: EFE Davies NP    Brief History:  Miguelina ChouEssence Hidalgo is a 57 y.o. Male with a history of AFIB, asthma, CAD, cancer, diabetes, fatigue, GERDS, hypertension, migraines, EMMY, obesity, seizures, snoring, and stroke. The patient has had problems with sleep apnea, snoring, and fatigue. The patient has had mild to moderate symptoms. The symptoms have been present for the last several years. He states that he is unable to use a CPAP due to the mask intolerance. He is interested in the inspire. Height:  72 inches  Weight: 258 lbs  BMI: 34.99  Neck Circ: 17.5\"  Mallampati: 4  ESS: 15    Type of Study: PSG  Time Stage Position Snore Hypopnea Obs Apnea Kodi Apnea PAP O2   2200 2 Left No No No No  RA   2300 2 Left No Yes No No  RA   2400 2 Right Yes No No No  RA   0100 2 Right Yes Yes No No  RA   0200 2 Right No Yes No No  RA   0300 Awake Left Yes No No No  RA     Summary: Patient is interested in the inspire device, he states that he is unable to use a CPAP due to the mask intolerance. [facility]      The study was reviewed briefly with Miguelina Recinos He will be notified of the formal results and recommendations after the study is scored and interpreted. The report will be sent to his referring provider.     Technician: Jeanette Long [facility]

## 2023-03-23 NOTE — PROGRESS NOTES
Polysomnography Report  Patient Name Kalyan Patton Account Number [de-identified]    1960 Referring Provider EFE Muñoz   Age/ Gender 70 years/M Interpreting physician Ken Orta M.D., [facility]   Neck circumference/  Mallampati classification 17.5 in/class 4 Night Technician Eloisa Segovia RRT, RPSGT  Biggs Por, RRT   Beaufort score 15/24 Scoring Technician Matilda Landon, CRT, RPSGT   Height 72.0 in Indications for the test excessive daytime somnolence   Weight 258.0 lbs Test Diagnostic Polysomnogram   BMI 35.0 Date of test 3/21/2023     Procedure  A Diagnostic Polysomnogram was conducted on the night of 3/21/2023. The study was performed and scored per AASM guidelines. The following were monitored: frontal, central, and occipital EEG, electrooculogram (EOG), submentalis EMG, nasal and oral airflow, intranasal pressure, thoracic plethysmography, abdominal plethysmography, anterior tibialis EMG, electrocardiogram, body position, and positive airway pressure (PAP). Arterial oxygen saturation was monitored with a pulse oximeter. The study was scored utilizing 30 second epochs. Hypopneas were scored using per AASM definition VIII, D, 1B.     Sleep Scoring Data  Lights out 8:35:22 PM Sleep latency 6.0 min Time in N1 238.5 min N1% 65.1%   Lights on 3:27:22 AM WASO 39.5 min Time in N2 97.5 min N2% 26.6%   TIB/.0 min Sleep efficiency 90.3% Time in N3 0.0 min N3% 0.0%   .5 min REM latency 273.0 min Time in R 30.5 min R% 8.3%     Respiratory Events Summary   NREM REM Total   Hypopnea index 13.0 33.4 15.1   Apnea index 1.3 2.0 1.5   RERA index 0.0 0.0 0.0   AHI 14.3 35.4 16.5   RDI (AHI + RERA index) 14.3 35.4 16.5     Respiratory Events by Sleep Stage   Obstructive Apneas OA Index Central Apneas CA Index Mixed Apneas MA Index   Hypopneas H Index   RERAs   R Index   NREM 3 0.5 3 0.5 1 0.2 73 13.0 0 0.0   REM 1 2.0 0 0.0 0 0.0 17 33.4 0 0.0   Total 5 0.8 3 0.5 1 0.2 92 15.1

## 2023-03-25 DIAGNOSIS — G47.33 OSA (OBSTRUCTIVE SLEEP APNEA): Primary | ICD-10-CM

## 2023-04-03 ENCOUNTER — TELEPHONE (OUTPATIENT)
Dept: OTOLARYNGOLOGY | Facility: CLINIC | Age: 63
End: 2023-04-03

## 2023-04-03 NOTE — TELEPHONE ENCOUNTER
Caller: Carlos A Boyer Jr.    Relationship to patient: SELF    Best call back number: [phone]    Patient is needing: PT WENT TO [facility]UD[facility] TO WEAR A MONITOR AT HOME FOR 3 DAYS. PT IS ASKING IF THIS IS NECESSARY.    PLEASE GIVE PT A CALL BACK TO DISCUSS.

## 2023-04-04 RX ORDER — SACUBITRIL AND VALSARTAN 97; 103 MG/1; MG/1
TABLET, FILM COATED ORAL
Qty: 180 TABLET | Refills: 3 | Status: ON HOLD | OUTPATIENT
Start: 2023-04-04 | End: 2023-04-18

## 2023-04-10 DIAGNOSIS — G40.909 SEIZURE DISORDER: ICD-10-CM

## 2023-04-10 RX ORDER — LEVETIRACETAM 500 MG/1
TABLET ORAL
Qty: 630 TABLET | Refills: 1 | Status: ON HOLD | OUTPATIENT
Start: 2023-04-10 | End: 2023-04-18

## 2023-04-10 RX ORDER — LAMOTRIGINE 200 MG/1
200 TABLET ORAL 2 TIMES DAILY
Qty: 180 TABLET | Refills: 1 | Status: SHIPPED | OUTPATIENT
Start: 2023-04-10

## 2023-04-10 NOTE — TELEPHONE ENCOUNTER
Caller: Carlos A Boyer Jr.    Relationship: Self    Best call back number: [phone]    Requested Prescriptions:   Requested Prescriptions     Pending Prescriptions Disp Refills   • lamoTRIgine (LaMICtal) 200 MG tablet 180 tablet 1     Sig: Take 1 tablet by mouth 2 (Two) Times a Day.   • levETIRAcetam (KEPPRA) 500 MG tablet 630 tablet 1     Sig: Take 3 tablets every morning, take 4 tablets every night.        Pharmacy where request should be sent: [facility] [address] [place] [address] [phone] [facility] [phone] FX     Last office visit with prescribing clinician: 2/21/2023   Last telemedicine visit with prescribing clinician: 8/22/2023   Next office visit with prescribing clinician: 8/22/2023     Additional details provided by patient: N/A     Does the patient have less than a 3 day supply:  [] Yes  [x] No    Would you like a call back once the refill request has been completed: [] Yes [x] No    If the office needs to give you a call back, can they leave a voicemail: [] Yes [x] No    Tyrone Wolff Rep   04/10/23 11:24 CDT

## 2023-04-12 ENCOUNTER — TELEPHONE (OUTPATIENT)
Dept: CARDIOLOGY | Facility: CLINIC | Age: 63
End: 2023-04-12
Payer: OTHER GOVERNMENT

## 2023-04-12 ENCOUNTER — TELEPHONE (OUTPATIENT)
Dept: CARDIOLOGY | Facility: CLINIC | Age: 63
End: 2023-04-12

## 2023-04-12 DIAGNOSIS — I50.32 CHRONIC DIASTOLIC CONGESTIVE HEART FAILURE: Primary | Chronic | ICD-10-CM

## 2023-04-12 RX ORDER — SPIRONOLACTONE 50 MG/1
50 TABLET, FILM COATED ORAL DAILY
Qty: 90 TABLET | Refills: 3 | Status: ON HOLD | OUTPATIENT
Start: 2023-04-12 | End: 2023-04-18

## 2023-04-12 NOTE — TELEPHONE ENCOUNTER
Patient called with C/O abd swelling, right arm swelling,LE edema,SOA, 14 lb weight in 2-3 weeks. 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 This was discontinued 11/9/2022 at discharge.Unable to wear his button pant.I moved his 4/24/23 appt to 4/14/23 and to seek medical attention if breathing and/or swelling worsens.V/U

## 2023-04-12 NOTE — TELEPHONE ENCOUNTER
Caller: Carlos A Boyer Jr.     Relationship: SELF    Best call back number: [phone] - ANYTIME    What is your medical concern? PATIENT HAS BEEN EXPERIENCING SWELLING IN HIS RIGHT HAND AND IN HIS STOMACH. HE FEELS TIGHTNESS AND SOME PAIN IN THE UPPER QUADRANT OF HIS STOMACH FROM THE FLUID BUILDUP. HE STATES THAT WHEN HE TAKES HIS FLUID PILLS FOR 2-3 DAYS, THE FLUID GOES DOWN. HE STATES THAT WHEN HE TAKES THE FLUID PILLS HE HAS ISSUES SLEEPING DUE TO HAVING TO URINATE FREQUENTLY. WHEN HE DOES NOT TAKE THE FLUID PILLS, HE GAINS ABOUT 5-6LBS OF LIQUID. HE HAS GAINED 14LBS IN THE LAST MONTH.    How long has this issue been going on? ABOUT 2-3 WEEKS    Is your provider already aware of this issue? YES    Have you been treated for this issue? YES -  PRESCRIBED THE FLUID PILL

## 2023-04-14 ENCOUNTER — OFFICE VISIT (OUTPATIENT)
Dept: CARDIOLOGY | Facility: CLINIC | Age: 63
End: 2023-04-14
Payer: OTHER GOVERNMENT

## 2023-04-14 VITALS
SYSTOLIC BLOOD PRESSURE: 155 MMHG | HEIGHT: 72 IN | HEART RATE: 56 BPM | DIASTOLIC BLOOD PRESSURE: 82 MMHG | BODY MASS INDEX: 35.21 KG/M2 | WEIGHT: 260 LBS

## 2023-04-14 DIAGNOSIS — R06.02 SHORTNESS OF BREATH: ICD-10-CM

## 2023-04-14 DIAGNOSIS — R01.1 CARDIAC MURMUR: ICD-10-CM

## 2023-04-14 DIAGNOSIS — Z99.89 OSA ON CPAP: Chronic | ICD-10-CM

## 2023-04-14 DIAGNOSIS — R06.09 DOE (DYSPNEA ON EXERTION): ICD-10-CM

## 2023-04-14 DIAGNOSIS — I50.33 ACUTE ON CHRONIC DIASTOLIC CONGESTIVE HEART FAILURE, NYHA CLASS 3: ICD-10-CM

## 2023-04-14 DIAGNOSIS — Z95.1 S/P CABG X 2: Chronic | ICD-10-CM

## 2023-04-14 DIAGNOSIS — G47.33 OSA ON CPAP: Chronic | ICD-10-CM

## 2023-04-14 DIAGNOSIS — I50.32 CHRONIC DIASTOLIC CONGESTIVE HEART FAILURE: Chronic | ICD-10-CM

## 2023-04-14 DIAGNOSIS — E78.5 HYPERLIPIDEMIA LDL GOAL <70: Chronic | ICD-10-CM

## 2023-04-14 DIAGNOSIS — I69.30 CHRONIC LEFT ARTERIAL ISCHEMIC STROKE, ICA (INTERNAL CAROTID ARTERY): ICD-10-CM

## 2023-04-14 DIAGNOSIS — E66.01 CLASS 2 SEVERE OBESITY DUE TO EXCESS CALORIES WITH SERIOUS COMORBIDITY AND BODY MASS INDEX (BMI) OF 35.0 TO 35.9 IN ADULT: ICD-10-CM

## 2023-04-14 DIAGNOSIS — I48.92 ATRIAL FLUTTER, UNSPECIFIED TYPE: Primary | ICD-10-CM

## 2023-04-14 RX ORDER — SODIUM CHLORIDE 0.9 % (FLUSH) 0.9 %
10 SYRINGE (ML) INJECTION AS NEEDED
OUTPATIENT
Start: 2023-04-14

## 2023-04-14 RX ORDER — SODIUM CHLORIDE 0.9 % (FLUSH) 0.9 %
3 SYRINGE (ML) INJECTION EVERY 12 HOURS SCHEDULED
OUTPATIENT
Start: 2023-04-14

## 2023-04-14 RX ORDER — SODIUM CHLORIDE 9 MG/ML
50 INJECTION, SOLUTION INTRAVENOUS CONTINUOUS
OUTPATIENT
Start: 2023-04-14 | End: 2023-04-14

## 2023-04-14 NOTE — PROGRESS NOTES
Carlos A Boyer Jr.  [id number]  1960  63 y.o.  male           Referring Provider: Vinyaak Correia PA    Reason for Follow-up Visit:       Here for routine follow up   Prior ER visit for Paroxysmal atrial fibrillation with with rapid ventricular response    CARLOS guided cardioversion, on anticoagulation with Eliquis  coronary artery disease stented coronary artery   Type 2 diabetes mellitus     S/p CABG x 2 Dr Tang 7/2019  S/P  right rotator cuff surgery Dr Divina [facility]   myocardial perfusion scan as below  Coronary CT angiography report as below    Here for routine follow up today        Subjective    Significant problem with swelling and increasing congestive heart failure   On medical therapy as below   Has had to take more diuretics   Has had PND   Class 3-4 shortness of breath   Mild chronic exertional shortness of breath on exertion relieved with rest  No significant cough or wheezing    No palpitations  No associated chest pain  No fever or chills  No significant expectoration    No hemoptysis  No presyncope or syncope    Tolerating current medications well with no untoward side effects   Compliant with prescribed medication regimen. Tries to adhere to cardiac diet.     No bleeding, excessive bruising, gait instability or fall risks    BP well controlled at home.     Blood sugars well controlled at home   Increasingly weak and gets tired easily   No new events or complaints since last visit     BNP as below             History of present illness:  Carlos A Boyer Jr. is a 63 y.o. yo male with Type 2 diabetes mellitus    essential hypertension     chest pain who presents today for   Chief Complaint   Patient presents with   • Congestive Heart Failure     2 mo f/u   .    History  Past Medical History:   Diagnosis Date   • Arthritis    • Asthma    • Cancer    • Carotid disease, bilateral    • Chest pain    • Chronic diastolic congestive heart failure 09/07/2019   • Chronic sinusitis    • Maribell  bullosa    • Coronary artery disease involving native coronary artery of native heart with unstable angina pectoris 01/17/2017   • Deviated septum    • Diabetes mellitus    • Difficulty urinating    • Diverticulitis    • Enlarged prostate    • Fatty liver    • GERD (gastroesophageal reflux disease)    • [place] (hard of hearing)     Does have hearing aids   • Hyperlipidemia LDL goal <70 02/02/2017   • Hypertrophy of nasal turbinates    • Keratoderma    • Kidney stone    • Migraine    • Murmur, heart    • Myocardial infarction    • Obesity    • Paroxysmal atrial fibrillation 07/11/2019   • Personal history of COVID-19 07/2021   • PONV (postoperative nausea and vomiting)    • Primary hypertension 10/16/2016   • Psoriasis    • Seizures    • Sinus congestion    • Skin cancer    • Sleep apnea     not using cpap   • SOB (shortness of breath)    • Stroke    • UTI (urinary tract infection)    ,   Past Surgical History:   Procedure Laterality Date   • CARDIAC CATHETERIZATION  01/2016    Dr. Broadbent; widely patent previously placed stents in the left anterior descending and obstructive disease involving the diagonal branch which was treated medically   • CARDIAC CATHETERIZATION N/A 07/14/2017    Procedure: Left Heart Cath;  Surgeon: Wade Ramey MD;  Location:  [facility];  Service:    • CARDIAC CATHETERIZATION Left 10/15/2018    Procedure: Cardiac Catheterization/Vascular Study;  Surgeon: Wade Ramey MD;  Location:  [facility];  Service: Cardiology   • CARDIAC CATHETERIZATION  10/15/2018    Procedure: Functional Flow [place];  Surgeon: Wade Ramey MD;  Location:  [facility];  Service: Cardiology   • CARDIAC CATHETERIZATION N/A 10/15/2018    Procedure: Left ventriculography;  Surgeon: Wade Ramey MD;  Location:  [facility];  Service: Cardiology   • CARDIAC CATHETERIZATION Left 06/26/2019    Procedure: Cardiac Catheterization/Vascular Study VEL MAYO  WILL WAIT 1 YEAR FOR SHOULDER SURGERY ;  Surgeon: Wade Ramey MD;  Location:  [facility];  Service: Cardiology   • CARDIAC CATHETERIZATION Left 04/30/2021    Procedure: Coronary angiography;  Surgeon: Sahil Llamas MD;  Location:  [facility];  Service: Cardiology;  Laterality: Left;   • CARDIAC CATHETERIZATION N/A 04/30/2021    Procedure: Percutaneous Coronary Intervention;  Surgeon: Sahil Llamas MD;  Location:  [facility];  Service: Cardiology;  Laterality: N/A;   • CARDIAC CATHETERIZATION N/A 11/09/2022    Procedure: Left Heart Cath with SVGs;  Surgeon: Wade Ramey MD;  Location:  [facility];  Service: Cardiology;  Laterality: N/A;   • CHOLECYSTECTOMY     • CHOLECYSTECTOMY WITH INTRAOPERATIVE CHOLANGIOGRAM N/A 08/01/2018    Procedure: CHOLECYSTECTOMY LAPAROSCOPIC INTRAOPERATIVE CHOLANGIOGRAM;  Surgeon: Shane Ann MD;  Location:  [facility] OR;  Service: General   • COLONOSCOPY N/A 07/14/2020    Procedure: COLONOSCOPY WITH ANESTHESIA;  Surgeon: Anupam Morales DO;  Location: [facility] ENDOSCOPY;  Service: Gastroenterology;  Laterality: N/A;  pre: abdominal pain  post: diverticulosis  Vinayak Correia PA   • CORONARY ANGIOPLASTY     • CORONARY ARTERY BYPASS GRAFT N/A 07/06/2019    Procedure: CABG X2 WITH LIMA, LEFT LEG OVH, AND PLACEMENT OF LEFT FEMORAL ARTERIAL LINE;  Surgeon: Steven Tang MD;  Location:  [facility] OR;  Service: Cardiothoracic   • CORONARY STENT PLACEMENT      x 6   • CYSTOSCOPY TRANSURETHRAL RESECTION OF PROSTATE N/A 1/23/2023    Procedure: CYSTOSCOPY TRANSURETHRAL RESECTION OF PROSTATE;  Surgeon: Latrell Pope MD;  Location: [facility] OR;  Service: Urology;  Laterality: N/A;   • ENDOSCOPIC FUNCTIONAL SINUS SURGERY (FESS) Bilateral 12/13/2017    Procedure: PROCEDURE PERFORMED:  Bilateral functional endoscopic anterior ethmoidectomy with bilateral middle meatal antrostomy Septoplasty Right kathia bullosa  resection Bilateral inferior turbinate reduction via [place];  Surgeon: Mayank Ibarra MD;  Location: [facility] OR;  Service:    • ENDOSCOPY N/A 2018    Procedure: ESOPHAGOGASTRODUODENOSCOPY WITH ANESTHESIA;  Surgeon: Benitez Mas MD;  Location:  [facility] ENDOSCOPY;  Service: Gastroenterology   • ENDOSCOPY N/A 2020    Procedure: ESOPHAGOGASTRODUODENOSCOPY WITH ANESTHESIA;  Surgeon: Anupam Morales DO;  Location:  [facility] ENDOSCOPY;  Service: Gastroenterology;  Laterality: N/A;  pre: abdominal pain  post: esophagitis  Vinayak Correia PA   • HERNIA REPAIR      x2 inguinal area   • KIDNEY STONE SURGERY     • KNEE ARTHROSCOPY Right 2022    Procedure: RIGHT KNEE PARTIAL LATERAL MENISCECTOMY;  Surgeon: Pedro Pablo Song MD;  Location: [facility] OR;  Service: Orthopedics;  Laterality: Right;   • KNEE SURGERY Right    • OTHER SURGICAL HISTORY      urolift   • PROSTATE SURGERY      Dr. Badillo -    • ROTATOR CUFF REPAIR Right    • SLEEP ENDOSCOPY N/A 2023    Procedure: Videosleep endoscopy;  Surgeon: Mayank Ibarra MD;  Location: [facility] OR;  Service: ENT;  Laterality: N/A;   • THUMB AMPUTATION Left     partial   • TOE AMPUTATION Right     big   ,   Family History   Problem Relation Age of Onset   • Heart disease Father    • COPD Mother    • Hypertension Mother    • Asthma Mother    • No Known Problems Sister    • Colon cancer Paternal Uncle    • Prostate cancer Maternal Grandfather    • No Known Problems Sister    • Colon cancer Maternal Grandmother    • Colon polyps Maternal Grandmother    ,   Social History     Tobacco Use   • Smoking status: Former     Packs/day: 1.50     Years: 18.00     Pack years: 27.00     Types: Cigarettes, Cigars     Start date:      Quit date:      Years since quittin.3   • Smokeless tobacco: Former     Types: Chew     Quit date:    Vaping Use   • Vaping Use: Never used   Substance Use Topics   • Alcohol use: No     Comment: 0   • Drug  use: No   ,     Medications  Current Outpatient Medications   Medication Sig Dispense Refill   • acetaminophen (TYLENOL) 500 MG tablet Take 1 tablet by mouth As Needed for Mild Pain. OTC     • albuterol (PROVENTIL HFA;VENTOLIN HFA) 108 (90 BASE) MCG/ACT inhaler Inhale 2 puffs Every 6 (Six) Hours As Needed for Wheezing.     • Aspirin 81 MG capsule Chew 81 mg Daily.     • calcium polycarbophil (FIBERCON) 625 MG tablet Take 1 tablet by mouth As Needed.     • carboxymethylcellulose (REFRESH PLUS) 0.5 % solution Administer 1 drop to both eyes 4 (Four) Times a Day As Needed for Dry Eyes.     • docusate sodium (Colace) 100 MG capsule Take 1 capsule by mouth 2 (Two) Times a Day. 60 capsule 1   • empagliflozin (JARDIANCE) 10 MG tablet tablet Take 1 tablet by mouth Daily.     • Entresto  MG tablet TAKE 1 TABLET BY MOUTH TWICE A DAY FOR HEART 180 tablet 3   • ezetimibe (ZETIA) 10 MG tablet Take 1 tablet by mouth Daily.     • fluticasone (FLONASE) 50 MCG/ACT nasal spray 2 sprays into the nostril(s) as directed by provider Daily As Needed for Rhinitis or Allergies.     • gabapentin (Neurontin) 300 MG capsule Take 1 capsule by mouth Every Night. Please overnight. 30 capsule 2   • guaiFENesin (MUCINEX) 600 MG 12 hr tablet Take 2 tablets by mouth As Needed for Congestion.     • insulin aspart (novoLOG FLEXPEN) 100 UNIT/ML solution pen-injector sc pen Inject 40 Units under the skin into the appropriate area as directed Every Morning. 40 units 3 times daily, a fourth dose mid day if needed per glucose     • insulin detemir (LEVEMIR) 100 UNIT/ML injection Inject 100 Units under the skin into the appropriate area as directed Daily. 110AM 110 units PM     • isosorbide mononitrate (IMDUR) 120 MG 24 hr tablet Take 1 tablet by mouth Daily. 90 tablet 3   • lamoTRIgine (LaMICtal) 200 MG tablet Take 1 tablet by mouth 2 (Two) Times a Day. 180 tablet 1   • levETIRAcetam (KEPPRA) 500 MG tablet Take 3 tablets every morning, take 4 tablets  every night. 630 tablet 1   • metFORMIN (GLUCOPHAGE) 1000 MG tablet Take 1 tablet by mouth 2 (Two) Times a Day With Meals. HOLD x 48 hours post contrast. May resume 3/18/18     • metoprolol tartrate (LOPRESSOR) 100 MG tablet Take 1 tablet by mouth 2 (Two) Times a Day. Told to take the DOS-2/27/23, w/ sip of water.     • Multiple Vitamin (MULTI VITAMIN PO) Take 1 tablet by mouth Daily.     • nitroglycerin (NITROSTAT) 0.4 MG SL tablet Place 1 tablet under the tongue Every 5 (Five) Minutes As Needed for Chest Pain. Take no more than 3 doses in 15 minutes.     • pantoprazole (PROTONIX) 40 MG EC tablet Take 1 tablet by mouth 2 (Two) Times a Day.     • psyllium (METAMUCIL) 58.6 % packet Take 1 packet by mouth Daily As Needed (constipation).     • Rimegepant Sulfate (Nurtec) 75 MG tablet dispersible tablet Take 1 tablet by mouth As Needed (Migraine). 8 tablet 5   • rosuvastatin (CRESTOR) 20 MG tablet Take 1 tablet by mouth Every Night.     • spironolactone (ALDACTONE) 50 MG tablet Take 1 tablet by mouth Daily. 90 tablet 3   • oxybutynin (DITROPAN) 5 MG tablet Take 1 tablet by mouth Every 8 (Eight) Hours As Needed (bladder spasms). (Patient not taking: Reported on 4/14/2023) 30 tablet 1     No current facility-administered medications for this visit.       Allergies:  Flagyl [metronidazole], Atorvastatin, and Ciprofloxacin    Review of Systems  Review of Systems   Constitutional: Positive for malaise/fatigue.   HENT: Negative.    Eyes: Negative.    Cardiovascular: Positive for dyspnea on exertion and leg swelling. Negative for chest pain, claudication, cyanosis, irregular heartbeat, near-syncope, orthopnea, palpitations, paroxysmal nocturnal dyspnea and syncope.   Respiratory: Negative.    Endocrine: Negative.    Hematologic/Lymphatic: Negative.    Skin: Negative.    Musculoskeletal: Positive for arthritis and back pain.   Gastrointestinal: Negative for anorexia.   Genitourinary: Negative.    Neurological: Positive for  "dizziness, light-headedness and weakness.   Psychiatric/Behavioral: Negative.        Objective     Physical Exam:      Vitals:    23 0921   BP: 155/82   Pulse: 56   Weight: 118 kg (260 lb)   Height: 182.9 cm (72\")         Physical Exam   Constitutional: He appears well-developed.   HENT:   Head: Normocephalic.   Neck: Normal carotid pulses and no JVD present. No tracheal tenderness present. Carotid bruit is not present. No tracheal deviation present.   Cardiovascular: Regular rhythm and normal pulses.   Murmur heard.   Systolic murmur is present with a grade of 2/6.  Pulmonary/Chest: Effort normal. No stridor.   Abdominal: Soft. He exhibits no distension. There is no abdominal tenderness.   Musculoskeletal:      Right lower le+ Pitting Edema present.      Left lower le+ Pitting Edema present.   Neurological: He is alert. No cranial nerve deficit or sensory deficit.   Skin: Skin is warm.   Psychiatric: His speech is normal and behavior is normal.       Results Review:    Conclusion of cardiac catheterization    2022        Coronary angiography:  Mild stenosis of left main coronary artery without obstructive disease  Left anterior descending coronary artery has multiple stents in the proximal and midportion after which the vessel is occluded  No obstructive disease of diagonal branch  No significant disease of main trunk of left circumflex coronary artery  First obtuse marginal branch has 50% stenosis with patent bypass graft  Second and third obtuse marginal branch does not have any obstructive disease  Patent saphenous venous graft to the obtuse marginal branch  Patent left internal mammary artery graft to the left anterior descending coronary artery  Right coronary artery has mild atherosclerotic changes in the proximal and midportion without obstructive disease.         Left heart cath: LVEDP  23  mm Hg with no gradient across aortic valve on pullback.      LV Gram : Not performed to save " contrast load        ____________________________________________________________________________________________________________________________________________     Plan after cardiac catheterization        Overall no obstructive coronary artery disease  Optimal guideline directed medical therapy  Oral hydration  Monitor kidney functions as outpatient     Usual post procedure precautions after arteriotomy including monitoring for signs both local and systemic side effects.  On ultimate discharge will receive printed instructions  Intensive risk factor modifications for both primary and secondary prevention if applicable  Hydration   Observation         FINDINGS:    Pulmonary arteries: There is adequate enhancement of the pulmonary  arteries to evaluate for central and segmental pulmonary emboli. There  are no filling defects within the main, lobar, segmental or visualized  subsegmental pulmonary arteries. The arteries are prominent mild  pulmonary arterial hypertension may be present..      Aorta and great vessels: The ascending aorta is 4 cm in diameter. No  evidence of dissection.. The great vessels are normal in appearance.     Visualized neck base: The imaged portion of the base of the neck appears  unremarkable.      Lungs: The lungs are clear. There is no mass, worrisome nodule, or  consolidation. No pleural effusion is seen. The trachea and bronchial  tree are patent.      Heart: Cardiac silhouette is mildly enlarged. Vascular calcifications  noted in the coronary arteries. The stent in the LAD is present. Wires  are present from previous median sternotomy. There is no pericardial  effusion.      Mediastinum and lymph nodes: No enlarged mediastinal, hilar, or axillary  lymph nodes are present.      Skeletal and soft tissues: The osseous structures of the thorax and  surrounding soft tissues demonstrate no acute process.     Upper abdomen: The imaged portion of the upper abdomen demonstrates no  acute  process.      IMPRESSION:  1. No evidence of embolic disease  2. Cardiomegaly and coronary artery calcification. With a stent in the  LAD.         Results for orders placed during the hospital encounter of 04/28/21    Adult Transthoracic Echo Complete W/ Cont if Necessary Per Protocol    Interpretation Summary  · Left ventricular ejection fraction appears to be 56 - 60%. Left ventricular systolic function is normal. There appears to be mild hypokinesis of the apex and distal septal and anterior walls.  · Left ventricular wall thickness is consistent with mild to moderate concentric hypertrophy.  · Left ventricular diastolic function is consistent with (grade II w/high LAP) pseudonormalization.  · Mild aortic valve stenosis is present.  · Normal size and function of the right ventricle.         Impression:  1.  Native two-vessel coronary artery disease, including chronic total occlusion of the mid-LAD and moderate to severe stenosis of the ostium of OM1.  Both bypass grafts (LIMA to LAD and SVG to OM1) are widely patent.  No changes in native anatomy compared to pre-CABG films from 2019 (with the exception of mid-LAD, which is now chronically and totally occluded, which is expected in the presence of the LIMA, which is now responsible for filling the remainder of the mid to distal LAD).  2.  No culprit stenosis seen for current presentation.  3.  Normal LVEF  4.  Elevated LVEDP.     Plan:   1.  2 hours bedrest  2.  Continue aspirin 81mg daily, and can resume Eliquis with p.m. dose on 5/13.    3.  Increase Imdur to 60 mg daily for improved antianginal benefit  4.  Increase lisinopril dose to 40 mg daily, as some of his symptoms may be attributed to poorly controlled hypertension  5.  Results communicated with primary hospital attending, Dr. Beckman  6.  Follow-up with primary cardiologist, Dr. Ramey, as planned in July, or sooner with persistent/worsening symptoms     Electronically signed by Sahil Llamas MD,  21, 9:38 AM CDT.       Cardiac CT angiography 2020     Impression:      1. Total calcium score : 3345.6  2.  Patent left internal mammary artery graft.  Severe stenosis of the mid left anterior descending coronary artery where there are earlier implanted stents  3.  Suspected more than 50% stenosis of the left anterior descending coronary artery after touchdown site of the left internal mammary artery graft  4.  Severe stenosis of proximal first obtuse marginal branch  5.   Patent saphenous venous graft to the first obtuse marginal branch with suspected more than 50% stenosis at the touchdown site.  Significant coronary calcification preventing good visualization of the anastomotic site.  6.   50 to 60% stenosis of the right posterior descending coronary artery        ___________________________________________________________________________     Recommendations:     Ongoing risk factor modifications  Further evaluation if ongoing chest pain or high risk of suspicion of significant ischemic heart disease          Carlos A Boyer .   Regadenoson Stress Test With Myocardial Perfusion SPECT (Multi Study)   Order# [id number]   Reading physician: Wade Ramey MD Ordering physician: Wade Ramey MD Study date: 20   Patient Information     Patient Name  Carlos A Boyer Jr. MRN  [id number] Sex  Male  (Age)  1960 (60 y.o.)   Interpretation Summary     · Left ventricular ejection fraction is normal (Calculated EF = 54%).  · Myocardial perfusion imaging indicates a medium-sized infarct located in the anterior wall and apex with no significant ischemia noted.  · Diaphragmatic attenuation and GI artifacts are present.  · Raw images reviewed with the following abnormalities noted: vertical motion.           Conclusion of cardiac catheterization     Left main coronary arteries normal  Left anterior descending coronary artery has patent stents  The distal edge of the stent in the mid left anterior  descending coronary artery has approximately 60% stenosis  Highly abnormal fractional flow reserve of this at 0.78 without adenosine stress.  PTCA done of the segment.  Despite multiple attempts and use of guide liner could not advance the stent due to earlier implanted stents.  Left circumflex coronary artery is codominant and large  The proximal portion of the first obtuse marginal branch has a 60% stenosis with normal fractional flow reserve above 0.85.  Right coronary artery is large and codominant without any high-grade lesions.     Left ventricular end-diastolic pressure is mildly elevated to 15 mmHg.  No gradient across aortic valve on pullback.  Left ventriculography shows a hyperdynamic left ventricle with ejection fraction of 75%.     Percutaneous coronary intervention     XB 3.5 guide advised used for fractional flow reserve of the obtuse marginal branch as well as of the left anterior descending coronary artery  Then we did try to advance a 2.5 mm stent.  We used 2 different size stents and could not cross  We used a guide liner and does not did not have placed cross across the stent.  We then did balloon angioplasty using 2.0 mm balloon.  Stenosis was reduced from 60% down to less than 10%.  BEVERLEY-3 flow before and after procedure.           ____________________________________________________________________________________________________________________________________________     Plan after cardiac catheterization        Dual antiplatelet therapy minimum of 1 year preferably longer  Statin ACE inhibitors beta-blockers  If there is repeat restenosis of the left anterior descending coronary artery would consider surgical revascularization.  Target LDL less than 70 mg/dL.  Maximize antianginal therapy.  Intensive risk factor modifications for both primary and secondary prevention if applicable  Hydration   Observation     Results for orders placed during the hospital encounter of 04/28/21    Adult  Transthoracic Echo Complete W/ Cont if Necessary Per Protocol    Interpretation Summary  · Left ventricular ejection fraction appears to be 56 - 60%. Left ventricular systolic function is normal. There appears to be mild hypokinesis of the apex and distal septal and anterior walls.  · Left ventricular wall thickness is consistent with mild to moderate concentric hypertrophy.  · Left ventricular diastolic function is consistent with (grade II w/high LAP) pseudonormalization.  · Mild aortic valve stenosis is present.  · Normal size and function of the right ventricle.  10/15/18        Conclusion     The left main coronary artery is normal  Left anterior descending coronary artery and diagonal branches are patent with a widely patent stent noted in the  left anterior descending coronary artery    Mid left anterior descending coronary artery is a 50% stenosis and hemodynamically not significant.  Left circumflex coronary artery is large with a first obtuse marginal around 40-60% stenosis.  Fractional flow reserve of this is completely normal and at above 0.9.  Left circumflex coronary artery is a codominant.  Right coronary artery is codominant large with distal small vessel disease.     Left ventricular end-diastolic pressure is normal at 11 mmHg no gradient across aortic valve on pullback  Left ventriculography shows ejection fraction of 55%.           Plan     Continue current medication  Symptomatic treatment for small vessel disease with antianginals  If stable can be discharged home later today  Intensive risk factor modifications for both primary and secondary prevention if applicable  Hydration   Observation         7/17 cath  Conclusion  Nonocclusive epicardial coronary arteries with widely patent stents in the  left anterior descending coronary artery   artery and left circumflex coronary artery.  Hyperdynamic left ventricle with ejection fraction of 75%.  LVEDP   17    mm Hg     Plan  Workup for noncardiac  causes of chest pain this can be done as outpatient.  His d-dimer is within normal range  After a period of observation of stable can be discharged either later today.  Keep follow-up appointment with me  Ongoing risk factor modifications keep LDL below 70 mg/dL  Hydration   Observation    ____________________________________________________________________________________________________________________________________________  Health maintenance and recommendations    Low salt/ HTN/ Heart healthy carbohydrate restricted cardiac diet (printed dietary and general instructions provided for home review )  The patient is advised to reduce or avoid caffeine or other cardiac stimulants.     The patient was advised to avoid long term NSAIDS , use Tylenol PRN instead  Avoid cardiac stimulants including common drugs like Pseudoephedrine or excessive amounts of caffeine  Monitor for any signs of bleeding including red or dark stools. Fall precautions.   Advised staying uptodate with immunizations per established standard guidelines.  Offered to give patient  a copy      Questions were encouraged, asked and answered to the patient's  understanding and satisfaction. Questions if any regarding current medications and side effects, need for refills and importance of compliance to medications stressed.    Reviewed available prior notes, consults, prior visits, laboratory findings, radiology and cardiology relevant reports. Updated chart as applicable. I have reviewed the patient's medical history in detail and updated the computerized patient record as relevant.      Updated patient regarding any new or relevant abnormalities on review of records or any new findings on physical exam. Mentioned to patient about purpose of visit and desirable health short and long term goals and objectives.    Primary to monitor CBC CMP Lipid panel and TSH as  applicable    ___________________________________________________________________________________________________________________________________________         Procedures     Diagnoses and all orders for this visit:    1. Atrial flutter, unspecified type (Primary)    2. AGUILAR (dyspnea on exertion)  -     Adult Transthoracic Echo Complete w/ Color, Spectral and Contrast if necessary per protocol; Future    3. S/P CABG x 2    4. HOA on CPAP    5. Hyperlipidemia LDL goal <70    6. Class 2 severe obesity due to excess calories with serious comorbidity and body mass index (BMI) of 35.0 to 35.9 in adult ([facility])    7. Cardiac murmur    8. Chronic left arterial ischemic stroke, ICA (internal carotid artery)    9. Chronic diastolic congestive heart failure ([facility])  -     Case Request Cath Lab: PA Pressure Sensor Implant; Standing  -     CBC and Differential; Future  -     Comprehensive metabolic panel; Future  -     Protime-INR; Future  -     sodium chloride 0.9 % infusion  -     sodium chloride 0.9 % flush 3 mL  -     sodium chloride 0.9 % flush 10 mL  -     Case Request Cath Lab: PA Pressure Sensor Implant    10. Shortness of breath    11. Acute on chronic diastolic congestive heart failure, NYHA class 3    Other orders  -     Clip bilateral groin.; Standing  -     Notify MD if patient is taking the following medications or has a contrast allergy.; Standing  -     CBC & Differential; Standing  -     Comprehensive Metabolic Panel; Standing  -     Protime-INR; Standing  -     Insert Peripheral IV; Standing  -     Saline Lock & Maintain IV Access; Standing  -     Instruct Patient To Stop Apixaban, Rivaroxaban, Edoxaban, Dibigatran, Vorapaxar, Atopaxar 48 Hours Before Scheduled Cardiac Procedure; Future  -     Verify On Day of Procedure: No Apixaban, Rivaroxaban, Edoxaban, Dabigatran, Vorapaxar, Atopaxar Administration 48 Hours Before Cardiac Procedure; Standing  -     Instruct Patient To Stop Metformin (including any combination  product containing Metformin) 48 Hours Before Scheduled Cardiac Procedure; Future  -     Verify On Day of Procedure: No Metformin (including any combination product containing Metformin) 48 Hours Before Scheduled Cardiac Procedure; Standing            Plan    Will order CardioMems as due to class 3 congestive heart failure will benefit as difficult to manage volume status   The current medical regimen is effective;  continue present plan and medications.   Orders Placed This Encounter   Procedures   • Comprehensive metabolic panel     Standing Status:   Future     Standing Expiration Date:   4/14/2024     Order Specific Question:   Release to patient     Answer:   Routine Release   • Protime-INR     Standing Status:   Future     Standing Expiration Date:   4/14/2024   • Instruct Patient To Stop Apixaban, Rivaroxaban, Edoxaban, Dibigatran, Vorapaxar, Atopaxar 48 Hours Before Scheduled Cardiac Procedure     Instruct Patient To Stop Apixaban, Rivaroxaban, Edoxaban, Dibigatran, Vorapaxar, Atopaxar 48 Hours Before Scheduled Cardiac Procedure     Standing Status:   Future     Standing Expiration Date:   4/14/2024   • Instruct Patient To Stop Metformin (including any combination product containing Metformin) 48 Hours Before Scheduled Cardiac Procedure     Instruct Patient To Stop Metformin (including any combination product containing Metformin) 48 Hours Before Scheduled Cardiac Procedure     Standing Status:   Future     Standing Expiration Date:   4/14/2024   • Adult Transthoracic Echo Complete w/ Color, Spectral and Contrast if necessary per protocol     Standing Status:   Future     Standing Expiration Date:   4/14/2024     Scheduling Instructions:      Myocardial strain to be performed       Use newer echo machine     Order Specific Question:   Reason for exam?     Answer:   Dyspnea     Order Specific Question:   Release to patient     Answer:   Routine Release   • CBC and Differential     Standing Status:   Future      Standing Expiration Date:   4/14/2024     Order Specific Question:   Manual Differential     Answer:   No      Check BP and heart rates twice daily initially till blood pressures and heart rates under good control and then at least 3x / week,   If blood pressures continue to be well-controlled then can check week a month  at home and bring a recording for review next visit  If BP >130/85 or < 100/60 persistently over 3 reading 30 mins apart or if heart rates persistently above 100 bpm or less than 55 bpm call sooner for evaluation and advise     Under F/U CTS for thoracic aortic aneurysm       The current medical regimen is effective;  continue present plan and medications.   Keep A1c less than 7 Primary to monitor  Keep LDL below 70 mg/dl. Monitor liver and renal functions.   Monitor CBC, CMP, TSH (as indicated) and Lipid Panel by primary      S/L NTG PRN for chest pain, call me or go to ER if has to use S/L nitroglycerin      I support the patient's decision to take the Covid -19 vaccine   Had required complete course   No major issues   Now fully immunized    Recommend booster     Monitor for any signs of bleeding including red or dark stools as well as easy bruisabilty. Fall precautions.          Return in about 3 months (around 7/14/2023).

## 2023-04-17 ENCOUNTER — APPOINTMENT (OUTPATIENT)
Dept: CT IMAGING | Facility: HOSPITAL | Age: 63
End: 2023-04-17
Payer: OTHER GOVERNMENT

## 2023-04-17 ENCOUNTER — APPOINTMENT (OUTPATIENT)
Dept: GENERAL RADIOLOGY | Facility: HOSPITAL | Age: 63
End: 2023-04-17
Payer: OTHER GOVERNMENT

## 2023-04-17 ENCOUNTER — HOSPITAL ENCOUNTER (INPATIENT)
Facility: HOSPITAL | Age: 63
LOS: 2 days | Discharge: HOME | End: 2023-04-19
Attending: EMERGENCY MEDICINE | Admitting: INTERNAL MEDICINE
Payer: OTHER GOVERNMENT

## 2023-04-17 DIAGNOSIS — I50.9 CONGESTIVE HEART FAILURE, UNSPECIFIED HF CHRONICITY, UNSPECIFIED HEART FAILURE TYPE: ICD-10-CM

## 2023-04-17 DIAGNOSIS — R91.1 PULMONARY NODULE: ICD-10-CM

## 2023-04-17 DIAGNOSIS — I20.0 UNSTABLE ANGINA PECTORIS: Primary | ICD-10-CM

## 2023-04-17 DIAGNOSIS — J18.9 COMMUNITY ACQUIRED PNEUMONIA, UNSPECIFIED LATERALITY: ICD-10-CM

## 2023-04-17 DIAGNOSIS — I71.21 ANEURYSM OF ASCENDING AORTA WITHOUT RUPTURE: ICD-10-CM

## 2023-04-17 DIAGNOSIS — I10 PRIMARY HYPERTENSION: ICD-10-CM

## 2023-04-17 PROBLEM — I50.43 ACUTE ON CHRONIC COMBINED SYSTOLIC AND DIASTOLIC CONGESTIVE HEART FAILURE: Status: ACTIVE | Noted: 2019-09-07

## 2023-04-17 PROBLEM — I50.33 ACUTE ON CHRONIC DIASTOLIC CONGESTIVE HEART FAILURE: Status: ACTIVE | Noted: 2019-09-07

## 2023-04-17 LAB
ALBUMIN SERPL-MCNC: 4.6 G/DL (ref 3.5–5.2)
ALBUMIN/GLOB SERPL: 1.5 G/DL
ALP SERPL-CCNC: 86 U/L (ref 39–117)
ALT SERPL W P-5'-P-CCNC: 18 U/L (ref 1–41)
ANION GAP SERPL CALCULATED.3IONS-SCNC: 12 MMOL/L (ref 5–15)
AST SERPL-CCNC: 19 U/L (ref 1–40)
BASOPHILS # BLD AUTO: 0.03 10*3/MM3 (ref 0–0.2)
BASOPHILS NFR BLD AUTO: 0.4 % (ref 0–1.5)
BILIRUB SERPL-MCNC: 1.3 MG/DL (ref 0–1.2)
BUN SERPL-MCNC: 12 MG/DL (ref 8–23)
BUN/CREAT SERPL: 12.8 (ref 7–25)
CALCIUM SPEC-SCNC: 9.8 MG/DL (ref 8.6–10.5)
CHLORIDE SERPL-SCNC: 100 MMOL/L (ref 98–107)
CO2 SERPL-SCNC: 23 MMOL/L (ref 22–29)
CREAT SERPL-MCNC: 0.94 MG/DL (ref 0.76–1.27)
D DIMER PPP FEU-MCNC: 1.09 MCGFEU/ML (ref 0–0.65)
DEPRECATED RDW RBC AUTO: 43.6 FL (ref 37–54)
EGFRCR SERPLBLD CKD-EPI 2021: 90 ML/MIN/1.73
EOSINOPHIL # BLD AUTO: 0.16 10*3/MM3 (ref 0–0.4)
EOSINOPHIL NFR BLD AUTO: 2.4 % (ref 0.3–6.2)
ERYTHROCYTE [DISTWIDTH] IN BLOOD BY AUTOMATED COUNT: 13.2 % (ref 12.3–15.4)
GEN 5 2HR TROPONIN T REFLEX: 18 NG/L
GLOBULIN UR ELPH-MCNC: 3 GM/DL
GLUCOSE BLDC GLUCOMTR-MCNC: 146 MG/DL (ref 70–130)
GLUCOSE BLDC GLUCOMTR-MCNC: 234 MG/DL (ref 70–130)
GLUCOSE SERPL-MCNC: 230 MG/DL (ref 65–99)
HCT VFR BLD AUTO: 41.3 % (ref 37.5–51)
HGB BLD-MCNC: 13.5 G/DL (ref 13–17.7)
HOLD SPECIMEN: NORMAL
HOLD SPECIMEN: NORMAL
LYMPHOCYTES # BLD AUTO: 1.09 10*3/MM3 (ref 0.7–3.1)
LYMPHOCYTES NFR BLD AUTO: 16.2 % (ref 19.6–45.3)
MCH RBC QN AUTO: 29.7 PG (ref 26.6–33)
MCHC RBC AUTO-ENTMCNC: 32.7 G/DL (ref 31.5–35.7)
MCV RBC AUTO: 91 FL (ref 79–97)
MONOCYTES # BLD AUTO: 0.58 10*3/MM3 (ref 0.1–0.9)
MONOCYTES NFR BLD AUTO: 8.6 % (ref 5–12)
NEUTROPHILS NFR BLD AUTO: 4.83 10*3/MM3 (ref 1.7–7)
NEUTROPHILS NFR BLD AUTO: 72 % (ref 42.7–76)
NT-PROBNP SERPL-MCNC: 1249 PG/ML (ref 0–900)
PLATELET # BLD AUTO: 106 10*3/MM3 (ref 140–450)
PMV BLD AUTO: 12 FL (ref 6–12)
POTASSIUM SERPL-SCNC: 4.3 MMOL/L (ref 3.5–5.2)
PROT SERPL-MCNC: 7.6 G/DL (ref 6–8.5)
RBC # BLD AUTO: 4.54 10*6/MM3 (ref 4.14–5.8)
SODIUM SERPL-SCNC: 135 MMOL/L (ref 136–145)
TROPONIN T DELTA: 5 NG/L
TROPONIN T SERPL HS-MCNC: 13 NG/L
TROPONIN T SERPL HS-MCNC: 13 NG/L
WBC NRBC COR # BLD: 6.72 10*3/MM3 (ref 3.4–10.8)
WHOLE BLOOD HOLD COAG: NORMAL
WHOLE BLOOD HOLD SPECIMEN: NORMAL

## 2023-04-17 PROCEDURE — 25010000002 FUROSEMIDE PER 20 MG: Performed by: EMERGENCY MEDICINE

## 2023-04-17 PROCEDURE — 25010000002 FUROSEMIDE PER 20 MG: Performed by: NURSE PRACTITIONER

## 2023-04-17 PROCEDURE — 99285 EMERGENCY DEPT VISIT HI MDM: CPT

## 2023-04-17 PROCEDURE — 63710000001 INSULIN DETEMIR PER 5 UNITS: Performed by: NURSE PRACTITIONER

## 2023-04-17 PROCEDURE — 71275 CT ANGIOGRAPHY CHEST: CPT

## 2023-04-17 PROCEDURE — 36415 COLL VENOUS BLD VENIPUNCTURE: CPT | Performed by: NURSE PRACTITIONER

## 2023-04-17 PROCEDURE — 83880 ASSAY OF NATRIURETIC PEPTIDE: CPT | Performed by: EMERGENCY MEDICINE

## 2023-04-17 PROCEDURE — 85025 COMPLETE CBC W/AUTO DIFF WBC: CPT | Performed by: EMERGENCY MEDICINE

## 2023-04-17 PROCEDURE — 25510000001 IOPAMIDOL PER 1 ML: Performed by: EMERGENCY MEDICINE

## 2023-04-17 PROCEDURE — 94799 UNLISTED PULMONARY SVC/PX: CPT

## 2023-04-17 PROCEDURE — 84484 ASSAY OF TROPONIN QUANT: CPT | Performed by: NURSE PRACTITIONER

## 2023-04-17 PROCEDURE — 84484 ASSAY OF TROPONIN QUANT: CPT | Performed by: EMERGENCY MEDICINE

## 2023-04-17 PROCEDURE — 87040 BLOOD CULTURE FOR BACTERIA: CPT | Performed by: EMERGENCY MEDICINE

## 2023-04-17 PROCEDURE — 82962 GLUCOSE BLOOD TEST: CPT

## 2023-04-17 PROCEDURE — 25010000002 CEFTRIAXONE PER 250 MG: Performed by: EMERGENCY MEDICINE

## 2023-04-17 PROCEDURE — 85379 FIBRIN DEGRADATION QUANT: CPT | Performed by: EMERGENCY MEDICINE

## 2023-04-17 PROCEDURE — 80053 COMPREHEN METABOLIC PANEL: CPT | Performed by: EMERGENCY MEDICINE

## 2023-04-17 PROCEDURE — 93005 ELECTROCARDIOGRAM TRACING: CPT | Performed by: EMERGENCY MEDICINE

## 2023-04-17 PROCEDURE — 93010 ELECTROCARDIOGRAM REPORT: CPT | Performed by: STUDENT IN AN ORGANIZED HEALTH CARE EDUCATION/TRAINING PROGRAM

## 2023-04-17 PROCEDURE — 71045 X-RAY EXAM CHEST 1 VIEW: CPT

## 2023-04-17 PROCEDURE — 63710000001 INSULIN LISPRO (HUMAN) PER 5 UNITS: Performed by: NURSE PRACTITIONER

## 2023-04-17 RX ORDER — ACETAMINOPHEN 650 MG/1
650 SUPPOSITORY RECTAL EVERY 4 HOURS PRN
Status: DISCONTINUED | OUTPATIENT
Start: 2023-04-17 | End: 2023-04-19 | Stop reason: HOSPADM

## 2023-04-17 RX ORDER — FLUTICASONE PROPIONATE 50 MCG
2 SPRAY, SUSPENSION (ML) NASAL DAILY PRN
Status: DISCONTINUED | OUTPATIENT
Start: 2023-04-17 | End: 2023-04-19 | Stop reason: HOSPADM

## 2023-04-17 RX ORDER — ENOXAPARIN SODIUM 100 MG/ML
40 INJECTION SUBCUTANEOUS DAILY
Status: DISCONTINUED | OUTPATIENT
Start: 2023-04-17 | End: 2023-04-17

## 2023-04-17 RX ORDER — SODIUM CHLORIDE 0.9 % (FLUSH) 0.9 %
10 SYRINGE (ML) INJECTION AS NEEDED
Status: DISCONTINUED | OUTPATIENT
Start: 2023-04-17 | End: 2023-04-19 | Stop reason: HOSPADM

## 2023-04-17 RX ORDER — LAMOTRIGINE 100 MG/1
200 TABLET ORAL 2 TIMES DAILY
Status: DISCONTINUED | OUTPATIENT
Start: 2023-04-17 | End: 2023-04-19 | Stop reason: HOSPADM

## 2023-04-17 RX ORDER — DEXTROSE MONOHYDRATE 25 G/50ML
25 INJECTION, SOLUTION INTRAVENOUS
Status: DISCONTINUED | OUTPATIENT
Start: 2023-04-17 | End: 2023-04-19 | Stop reason: HOSPADM

## 2023-04-17 RX ORDER — ISOSORBIDE MONONITRATE 60 MG/1
120 TABLET, EXTENDED RELEASE ORAL DAILY
Status: DISCONTINUED | OUTPATIENT
Start: 2023-04-18 | End: 2023-04-19 | Stop reason: HOSPADM

## 2023-04-17 RX ORDER — NICOTINE POLACRILEX 4 MG
15 LOZENGE BUCCAL
Status: DISCONTINUED | OUTPATIENT
Start: 2023-04-17 | End: 2023-04-19 | Stop reason: HOSPADM

## 2023-04-17 RX ORDER — SPIRONOLACTONE 50 MG/1
50 TABLET, FILM COATED ORAL 2 TIMES DAILY
Status: DISCONTINUED | OUTPATIENT
Start: 2023-04-17 | End: 2023-04-18

## 2023-04-17 RX ORDER — ASPIRIN 81 MG/1
81 TABLET ORAL DAILY
Status: DISCONTINUED | OUTPATIENT
Start: 2023-04-18 | End: 2023-04-19 | Stop reason: HOSPADM

## 2023-04-17 RX ORDER — SODIUM CHLORIDE 0.9 % (FLUSH) 0.9 %
10 SYRINGE (ML) INJECTION EVERY 12 HOURS SCHEDULED
Status: DISCONTINUED | OUTPATIENT
Start: 2023-04-17 | End: 2023-04-19 | Stop reason: HOSPADM

## 2023-04-17 RX ORDER — LEVETIRACETAM 500 MG/1
1500 TABLET ORAL EVERY MORNING
Status: DISCONTINUED | OUTPATIENT
Start: 2023-04-18 | End: 2023-04-19 | Stop reason: HOSPADM

## 2023-04-17 RX ORDER — PANTOPRAZOLE SODIUM 40 MG/1
40 TABLET, DELAYED RELEASE ORAL
Status: DISCONTINUED | OUTPATIENT
Start: 2023-04-17 | End: 2023-04-19 | Stop reason: HOSPADM

## 2023-04-17 RX ORDER — NITROGLYCERIN 0.4 MG/1
0.4 TABLET SUBLINGUAL
Status: DISCONTINUED | OUTPATIENT
Start: 2023-04-17 | End: 2023-04-19 | Stop reason: HOSPADM

## 2023-04-17 RX ORDER — FUROSEMIDE 10 MG/ML
40 INJECTION INTRAMUSCULAR; INTRAVENOUS
Status: COMPLETED | OUTPATIENT
Start: 2023-04-17 | End: 2023-04-18

## 2023-04-17 RX ORDER — SODIUM CHLORIDE 9 MG/ML
40 INJECTION, SOLUTION INTRAVENOUS AS NEEDED
Status: DISCONTINUED | OUTPATIENT
Start: 2023-04-17 | End: 2023-04-19 | Stop reason: HOSPADM

## 2023-04-17 RX ORDER — ACETAMINOPHEN 160 MG/5ML
650 SOLUTION ORAL EVERY 4 HOURS PRN
Status: DISCONTINUED | OUTPATIENT
Start: 2023-04-17 | End: 2023-04-19 | Stop reason: HOSPADM

## 2023-04-17 RX ORDER — GUAIFENESIN 600 MG/1
600 TABLET, EXTENDED RELEASE ORAL EVERY 12 HOURS SCHEDULED
Status: DISCONTINUED | OUTPATIENT
Start: 2023-04-17 | End: 2023-04-19 | Stop reason: HOSPADM

## 2023-04-17 RX ORDER — GABAPENTIN 300 MG/1
300 CAPSULE ORAL NIGHTLY
Status: DISCONTINUED | OUTPATIENT
Start: 2023-04-17 | End: 2023-04-19 | Stop reason: HOSPADM

## 2023-04-17 RX ORDER — LEVETIRACETAM 500 MG/1
2000 TABLET ORAL EVERY EVENING
Status: DISCONTINUED | OUTPATIENT
Start: 2023-04-17 | End: 2023-04-19 | Stop reason: HOSPADM

## 2023-04-17 RX ORDER — METOPROLOL TARTRATE 50 MG/1
100 TABLET, FILM COATED ORAL 2 TIMES DAILY
Status: DISCONTINUED | OUTPATIENT
Start: 2023-04-17 | End: 2023-04-19 | Stop reason: HOSPADM

## 2023-04-17 RX ORDER — AMOXICILLIN 250 MG
1 CAPSULE ORAL NIGHTLY PRN
Status: DISCONTINUED | OUTPATIENT
Start: 2023-04-17 | End: 2023-04-19 | Stop reason: HOSPADM

## 2023-04-17 RX ORDER — ASPIRIN 81 MG/1
324 TABLET, CHEWABLE ORAL ONCE
Status: COMPLETED | OUTPATIENT
Start: 2023-04-17 | End: 2023-04-17

## 2023-04-17 RX ORDER — SACCHAROMYCES BOULARDII 250 MG
250 CAPSULE ORAL 2 TIMES DAILY
Status: DISCONTINUED | OUTPATIENT
Start: 2023-04-17 | End: 2023-04-19 | Stop reason: HOSPADM

## 2023-04-17 RX ORDER — FUROSEMIDE 10 MG/ML
40 INJECTION INTRAMUSCULAR; INTRAVENOUS ONCE
Status: COMPLETED | OUTPATIENT
Start: 2023-04-17 | End: 2023-04-17

## 2023-04-17 RX ORDER — ROSUVASTATIN CALCIUM 20 MG/1
20 TABLET, COATED ORAL NIGHTLY
Status: DISCONTINUED | OUTPATIENT
Start: 2023-04-17 | End: 2023-04-19 | Stop reason: HOSPADM

## 2023-04-17 RX ORDER — INSULIN LISPRO 100 [IU]/ML
0-24 INJECTION, SOLUTION INTRAVENOUS; SUBCUTANEOUS
Status: DISCONTINUED | OUTPATIENT
Start: 2023-04-17 | End: 2023-04-19 | Stop reason: HOSPADM

## 2023-04-17 RX ORDER — DOCUSATE SODIUM 100 MG/1
100 CAPSULE, LIQUID FILLED ORAL 2 TIMES DAILY
Status: DISCONTINUED | OUTPATIENT
Start: 2023-04-17 | End: 2023-04-19 | Stop reason: HOSPADM

## 2023-04-17 RX ORDER — NITROGLYCERIN 20 MG/100ML
5-200 INJECTION INTRAVENOUS
Status: DISCONTINUED | OUTPATIENT
Start: 2023-04-17 | End: 2023-04-19 | Stop reason: HOSPADM

## 2023-04-17 RX ORDER — LEVETIRACETAM 500 MG/1
2000 TABLET ORAL EVERY EVENING
Status: DISCONTINUED | OUTPATIENT
Start: 2023-04-17 | End: 2023-04-17

## 2023-04-17 RX ORDER — ACETAMINOPHEN 325 MG/1
650 TABLET ORAL EVERY 4 HOURS PRN
Status: DISCONTINUED | OUTPATIENT
Start: 2023-04-17 | End: 2023-04-19 | Stop reason: HOSPADM

## 2023-04-17 RX ADMIN — Medication 10 ML: at 21:04

## 2023-04-17 RX ADMIN — GUAIFENESIN 600 MG: 600 TABLET, EXTENDED RELEASE ORAL at 21:03

## 2023-04-17 RX ADMIN — FUROSEMIDE 40 MG: 10 INJECTION, SOLUTION INTRAVENOUS at 11:10

## 2023-04-17 RX ADMIN — CEFTRIAXONE 1 G: 1 INJECTION, POWDER, FOR SOLUTION INTRAMUSCULAR; INTRAVENOUS at 15:31

## 2023-04-17 RX ADMIN — SACUBITRIL AND VALSARTAN 1 TABLET: 49; 51 TABLET, FILM COATED ORAL at 21:04

## 2023-04-17 RX ADMIN — LEVETIRACETAM 2000 MG: 500 TABLET, FILM COATED ORAL at 21:02

## 2023-04-17 RX ADMIN — PANTOPRAZOLE SODIUM 40 MG: 40 TABLET, DELAYED RELEASE ORAL at 18:36

## 2023-04-17 RX ADMIN — SPIRONOLACTONE 50 MG: 50 TABLET ORAL at 21:03

## 2023-04-17 RX ADMIN — NITROGLYCERIN 10 MCG/MIN: 20 INJECTION INTRAVENOUS at 11:17

## 2023-04-17 RX ADMIN — INSULIN LISPRO 8 UNITS: 100 INJECTION, SOLUTION INTRAVENOUS; SUBCUTANEOUS at 21:20

## 2023-04-17 RX ADMIN — METOPROLOL TARTRATE 100 MG: 50 TABLET, FILM COATED ORAL at 21:02

## 2023-04-17 RX ADMIN — LAMOTRIGINE 200 MG: 100 TABLET ORAL at 21:02

## 2023-04-17 RX ADMIN — GABAPENTIN 300 MG: 300 CAPSULE ORAL at 21:04

## 2023-04-17 RX ADMIN — INSULIN DETEMIR 55 UNITS: 100 INJECTION, SOLUTION SUBCUTANEOUS at 21:05

## 2023-04-17 RX ADMIN — APIXABAN 5 MG: 5 TABLET, FILM COATED ORAL at 21:03

## 2023-04-17 RX ADMIN — ROSUVASTATIN CALCIUM 20 MG: 20 TABLET, FILM COATED ORAL at 21:03

## 2023-04-17 RX ADMIN — IOPAMIDOL 100 ML: 755 INJECTION, SOLUTION INTRAVENOUS at 13:33

## 2023-04-17 RX ADMIN — ASPIRIN 243 MG: 81 TABLET, CHEWABLE ORAL at 11:04

## 2023-04-17 RX ADMIN — FUROSEMIDE 40 MG: 10 INJECTION, SOLUTION INTRAVENOUS at 18:36

## 2023-04-17 RX ADMIN — Medication 250 MG: at 21:03

## 2023-04-17 NOTE — H&P
[facility] Medicine Services  HISTORY AND PHYSICAL    Date of Admission: 4/17/2023  Primary Care Physician: Vinayak Correia PA    Subjective   Primary Historian: Patient and wife    Chief Complaint: Shortness of breathing and chest pressure    History of Present Illness  Carlos A Boyer Jr. is a 63-year-old male with a past medical history to include coronary artery disease with CABG, diastolic heart failure, insulin-dependent diabetes-suspect insulin resistance, paroxysmal atrial fibrillation on Eliquis, please see below for complete list.  Patient seen by Dr. Ramey, cardiology 4/14/2023 with plans for echo with possible CardioMEMS placement 5/3/2023.  Patient has instructions regarding holding Eliquis and metformin.  Cardiology consulted however Dr. Ramey is not on-call.  Call placed to him to update him on patient's admission for acute on chronic heart failure.  Abnormal CT angiogram reveals possible mild right lower lobe consolidation with concerns for pneumonia however patient has had no cough, fever, recent illness.  He has known AAA with increasing size, discussing with Dr. Rice re: f/u plans.  Patient presented [facility] emergency department with complaints of shortness of breathing has been occurring for the past several days however he developed sudden severe chest pressure earlier today.  He had associated nausea, no diaphoresis.  He has been placed on Tridil with relief of symptoms.  He is on room air.  Troponin initially negative now slightly positive with EKG changes.  Elevated B NP also noted.  He is admitted for further evaluation treatment.      Review of Systems   Otherwise complete ROS reviewed and negative except as mentioned in the HPI.    Past Medical History:   Past Medical History:   Diagnosis Date   • Arthritis    • Asthma    • Cancer    • Carotid disease, bilateral    • Chest pain    • Chronic diastolic congestive heart failure 09/07/2019    • Chronic sinusitis    • Maribell bullosa    • Coronary artery disease involving native coronary artery of native heart with unstable angina pectoris 01/17/2017   • Deviated septum    • Diabetes mellitus    • Difficulty urinating    • Diverticulitis    • Enlarged prostate    • Fatty liver    • GERD (gastroesophageal reflux disease)    • [place] (hard of hearing)     Does have hearing aids   • Hyperlipidemia LDL goal <70 02/02/2017   • Hypertrophy of nasal turbinates    • Keratoderma    • Kidney stone    • Migraine    • Murmur, heart    • Myocardial infarction    • Obesity    • Paroxysmal atrial fibrillation 07/11/2019   • Personal history of COVID-19 07/2021   • PONV (postoperative nausea and vomiting)    • Primary hypertension 10/16/2016   • Psoriasis    • Seizures    • Sinus congestion    • Skin cancer    • Sleep apnea     not using cpap   • SOB (shortness of breath)    • Stroke    • UTI (urinary tract infection)      Past Surgical History:  Past Surgical History:   Procedure Laterality Date   • CARDIAC CATHETERIZATION  01/2016    Dr. Broadbent; widely patent previously placed stents in the left anterior descending and obstructive disease involving the diagonal branch which was treated medically   • CARDIAC CATHETERIZATION N/A 07/14/2017    Procedure: Left Heart Cath;  Surgeon: Wade Ramey MD;  Location:  [facility] CATH INVASIVE [facility];  Service:    • CARDIAC CATHETERIZATION Left 10/15/2018    Procedure: Cardiac Catheterization/Vascular Study;  Surgeon: Wade Ramey MD;  Location:  [facility]ATH [facility];  Service: Cardiology   • CARDIAC CATHETERIZATION  10/15/2018    Procedure: Functional Flow [place];  Surgeon: Wade Ramey MD;  Location:  [facility] CATH [facility];  Service: Cardiology   • CARDIAC CATHETERIZATION N/A 10/15/2018    Procedure: Left ventriculography;  Surgeon: Wade Ramey MD;  Location:  [facility];  Service: Cardiology   • CARDIAC CATHETERIZATION Left 06/26/2019     Procedure: Cardiac Catheterization/Vascular Study VEL OK  HE WILL WAIT 1 YEAR FOR SHOULDER SURGERY ;  Surgeon: Wade Ramey MD;  Location:  [facility];  Service: Cardiology   • CARDIAC CATHETERIZATION Left 04/30/2021    Procedure: Coronary angiography;  Surgeon: Sahil Llamas MD;  Location:  [facility]ATH [facility];  Service: Cardiology;  Laterality: Left;   • CARDIAC CATHETERIZATION N/A 04/30/2021    Procedure: Percutaneous Coronary Intervention;  Surgeon: Sahil Llamas MD;  Location:  [facility] C[facility];  Service: Cardiology;  Laterality: N/A;   • CARDIAC CATHETERIZATION N/A 11/09/2022    Procedure: Left Heart Cath with SVGs;  Surgeon: Wade Ramey MD;  Location:  [facility];  Service: Cardiology;  Laterality: N/A;   • CHOLECYSTECTOMY     • CHOLECYSTECTOMY WITH INTRAOPERATIVE CHOLANGIOGRAM N/A 08/01/2018    Procedure: CHOLECYSTECTOMY LAPAROSCOPIC INTRAOPERATIVE CHOLANGIOGRAM;  Surgeon: Shane Ann MD;  Location:  [facility] OR;  Service: General   • COLONOSCOPY N/A 07/14/2020    Procedure: COLONOSCOPY WITH ANESTHESIA;  Surgeon: Anupam Morales DO;  Location: [facility] ENDOSCOPY;  Service: Gastroenterology;  Laterality: N/A;  pre: abdominal pain  post: diverticulosis  Vinayak Correia PA   • CORONARY ANGIOPLASTY     • CORONARY ARTERY BYPASS GRAFT N/A 07/06/2019    Procedure: CABG X2 WITH LIMA, LEFT LEG OVH, AND PLACEMENT OF LEFT FEMORAL ARTERIAL LINE;  Surgeon: Steven Tang MD;  Location:  [facility] OR;  Service: Cardiothoracic   • CORONARY STENT PLACEMENT      x 6   • CYSTOSCOPY TRANSURETHRAL RESECTION OF PROSTATE N/A 1/23/2023    Procedure: CYSTOSCOPY TRANSURETHRAL RESECTION OF PROSTATE;  Surgeon: Latrell Pope MD;  Location: [facility] OR;  Service: Urology;  Laterality: N/A;   • ENDOSCOPIC FUNCTIONAL SINUS SURGERY (FESS) Bilateral 12/13/2017    Procedure: PROCEDURE PERFORMED:  Bilateral functional endoscopic anterior ethmoidectomy with bilateral  middle meatal antrostomy Septoplasty Right kathia bullosa resection Bilateral inferior turbinate reduction via Coblation;  Surgeon: Mayank Ibarra MD;  Location: [facility] OR;  Service:    • ENDOSCOPY N/A 07/30/2018    Procedure: ESOPHAGOGASTRODUODENOSCOPY WITH ANESTHESIA;  Surgeon: Benitez Mas MD;  Location:  [facility] ENDOSCOPY;  Service: Gastroenterology   • ENDOSCOPY N/A 07/14/2020    Procedure: ESOPHAGOGASTRODUODENOSCOPY WITH ANESTHESIA;  Surgeon: Anupam Morales DO;  Location:  [facility] ENDOSCOPY;  Service: Gastroenterology;  Laterality: N/A;  pre: abdominal pain  post: esophagitis  Vinayak Correia PA   • HERNIA REPAIR      x2 inguinal area   • KIDNEY STONE SURGERY     • KNEE ARTHROSCOPY Right 03/01/2022    Procedure: RIGHT KNEE PARTIAL LATERAL MENISCECTOMY;  Surgeon: Pedro Pablo Song MD;  Location: [facility] OR;  Service: Orthopedics;  Laterality: Right;   • KNEE SURGERY Right    • OTHER SURGICAL HISTORY      urolift   • PROSTATE SURGERY      Dr. Badillo - 2017   • ROTATOR CUFF REPAIR Right    • SLEEP ENDOSCOPY N/A 2/27/2023    Procedure: Videosleep endoscopy;  Surgeon: Mayank Ibarra MD;  Location: [facility] OR;  Service: ENT;  Laterality: N/A;   • THUMB AMPUTATION Left     partial   • TOE AMPUTATION Right     big     Social History:  reports that he quit smoking about 30 years ago. His smoking use included cigarettes and cigars. He started smoking about 48 years ago. He has a 27.00 pack-year smoking history. He quit smokeless tobacco use about 14 years ago.  His smokeless tobacco use included chew. He reports that he does not drink alcohol and does not use drugs.    Family History: family history includes Asthma in his mother; COPD in his mother; Colon cancer in his maternal grandmother and paternal uncle; Colon polyps in his maternal grandmother; Heart disease in his father; Hypertension in his mother; No Known Problems in his sister and sister; Prostate cancer in his maternal grandfather.        Allergies:  Allergies   Allergen Reactions   • Flagyl [Metronidazole] Hives   • Atorvastatin Other (See Comments)      - LIPITOR -   Muscle cramps   • Ciprofloxacin Hives      - CIPRO -        Medications:  Prior to Admission medications    Medication Sig Start Date End Date Taking? Authorizing Provider   acetaminophen (TYLENOL) 500 MG tablet Take 1 tablet by mouth As Needed for Mild Pain. OTC    Rohan Christina MD   albuterol (PROVENTIL HFA;VENTOLIN HFA) 108 (90 BASE) MCG/ACT inhaler Inhale 2 puffs Every 6 (Six) Hours As Needed for Wheezing.    Rohan Christina MD   Aspirin 81 MG capsule Chew 81 mg Daily.    Rohan Christina MD   calcium polycarbophil (FIBERCON) 625 MG tablet Take 1 tablet by mouth As Needed.    Rohan Christina MD   carboxymethylcellulose (REFRESH PLUS) 0.5 % solution Administer 1 drop to both eyes 4 (Four) Times a Day As Needed for Dry Eyes.    Rohan Christina MD   docusate sodium (Colace) 100 MG capsule Take 1 capsule by mouth 2 (Two) Times a Day. 1/24/23   Latrell Pope MD   empagliflozin (JARDIANCE) 10 MG tablet tablet Take 1 tablet by mouth Daily.    Rohan Christina MD   Entresto  MG tablet TAKE 1 TABLET BY MOUTH TWICE A DAY FOR HEART 4/4/23   Agnieszka Jeff APRN   ezetimibe (ZETIA) 10 MG tablet Take 1 tablet by mouth Daily.    Rohan Christina MD   fluticasone (FLONASE) 50 MCG/ACT nasal spray 2 sprays into the nostril(s) as directed by provider Daily As Needed for Rhinitis or Allergies.    Rohan Christina MD   gabapentin (Neurontin) 300 MG capsule Take 1 capsule by mouth Every Night. Please overnight. 7/11/22   Monty Monreal PA-C   guaiFENesin (MUCINEX) 600 MG 12 hr tablet Take 2 tablets by mouth As Needed for Congestion.    Rohan Christina MD   insulin aspart (novoLOG FLEXPEN) 100 UNIT/ML solution pen-injector sc pen Inject 40 Units under the skin into the appropriate area as directed Every Morning. 40 units 3 times  daily, a fourth dose mid day if needed per glucose    Rohan Christina MD   insulin detemir (LEVEMIR) 100 UNIT/ML injection Inject 100 Units under the skin into the appropriate area as directed Daily. 110AM 110 units PM    Emergency, Nurse Dano, RN   isosorbide mononitrate (IMDUR) 120 MG 24 hr tablet Take 1 tablet by mouth Daily. 1/11/22   Agnieszka Jeff APRN   lamoTRIgine (LaMICtal) 200 MG tablet Take 1 tablet by mouth 2 (Two) Times a Day. 4/10/23   Flako Freeman APRN   levETIRAcetam (KEPPRA) 500 MG tablet Take 3 tablets every morning, take 4 tablets every night. 4/10/23   Flako Freeman APRN   metFORMIN (GLUCOPHAGE) 1000 MG tablet Take 1 tablet by mouth 2 (Two) Times a Day With Meals. HOLD x 48 hours post contrast. May resume 3/18/18 3/16/18   Meghan Salazar APRN   metoprolol tartrate (LOPRESSOR) 100 MG tablet Take 1 tablet by mouth 2 (Two) Times a Day. Told to take the DOS-2/27/23, w/ sip of water.    ProviderRohan MD   Multiple Vitamin (MULTI VITAMIN PO) Take 1 tablet by mouth Daily.    ProviderRohan MD   nitroglycerin (NITROSTAT) 0.4 MG SL tablet Place 1 tablet under the tongue Every 5 (Five) Minutes As Needed for Chest Pain. Take no more than 3 doses in 15 minutes.    Rohan Christina MD   oxybutynin (DITROPAN) 5 MG tablet Take 1 tablet by mouth Every 8 (Eight) Hours As Needed (bladder spasms).  Patient not taking: Reported on 4/14/2023 1/24/23   Latrell Pope MD   pantoprazole (PROTONIX) 40 MG EC tablet Take 1 tablet by mouth 2 (Two) Times a Day.    Rohan Christina MD   psyllium (METAMUCIL) 58.6 % packet Take 1 packet by mouth Daily As Needed (constipation).    Rohan Christina MD   Rimegepant Sulfate (Nurtec) 75 MG tablet dispersible tablet Take 1 tablet by mouth As Needed (Migraine). 11/29/22   Flako Freeman APRN   rosuvastatin (CRESTOR) 20 MG tablet Take 1 tablet by mouth Every Night.    Provider, MD Rohan   spironolactone (ALDACTONE) 50  "MG tablet Take 1 tablet by mouth Daily.  Patient taking differently: Take 1 tablet by mouth 2 (Two) Times a Day. 4/12/23   Agnieszka Jeff APRN     I have utilized all available immediate resources to obtain, update, or review the patient's current medications (including all prescriptions, over-the-counter products, herbals, cannabis/cannabidiol products, and vitamin/mineral/dietary (nutritional) supplements).    Objective     Vital Signs: /99 (BP Location: Right arm, Patient Position: Sitting)   Pulse 65   Temp 97.9 °F (36.6 °C) (Oral)   Resp 18   Ht 182.9 cm (72\")   Wt 118 kg (260 lb)   SpO2 95%   BMI 35.26 kg/m²   Physical Exam  Vitals reviewed.   Constitutional:       Appearance: Normal appearance. He is obese.   HENT:      Head: Normocephalic and atraumatic.      Mouth/Throat:      Mouth: Mucous membranes are moist.      Pharynx: Oropharynx is clear.   Eyes:      Extraocular Movements: Extraocular movements intact.      Conjunctiva/sclera: Conjunctivae normal.   Cardiovascular:      Rate and Rhythm: Normal rate and regular rhythm.      Heart sounds: Murmur heard.   Pulmonary:      Effort: Pulmonary effort is normal.      Breath sounds: Normal breath sounds.   Abdominal:      Palpations: Abdomen is soft.      Comments: Protuberant   Musculoskeletal:         General: Normal range of motion.      Cervical back: Normal range of motion and neck supple.      Right lower leg: No edema.      Left lower leg: No edema.   Skin:     General: Skin is warm and dry.   Neurological:      General: No focal deficit present.      Mental Status: He is alert and oriented to person, place, and time.   Psychiatric:         Mood and Affect: Mood normal.         Behavior: Behavior normal.              Results Reviewed:  Lab Results (last 24 hours)     Procedure Component Value Units Date/Time    High Sensitivity Troponin T 2Hr [[id number]] Collected: 04/17/23 1446    Specimen: Blood Updated: 04/17/23 1452    " Comprehensive Metabolic Panel [[id number]]  (Abnormal) Collected: 04/17/23 1220    Specimen: Blood Updated: 04/17/23 1307     Glucose 230 mg/dL      BUN 12 mg/dL      Creatinine 0.94 mg/dL      Sodium 135 mmol/L      Potassium 4.3 mmol/L      Comment: Slight hemolysis detected by analyzer. Results may be affected.        Chloride 100 mmol/L      CO2 23.0 mmol/L      Calcium 9.8 mg/dL      Total Protein 7.6 g/dL      Albumin 4.6 g/dL      ALT (SGPT) 18 U/L      AST (SGOT) 19 U/L      Comment: Slight hemolysis detected by analyzer. Results may be affected.        Alkaline Phosphatase 86 U/L      Total Bilirubin 1.3 mg/dL      Globulin 3.0 gm/dL      A/G Ratio 1.5 g/dL      BUN/Creatinine Ratio 12.8     Anion Gap 12.0 mmol/L      eGFR 90.0 mL/min/1.73     High Sensitivity Troponin T [[id number]]  (Normal) Collected: 04/17/23 1220    Specimen: Blood Updated: 04/17/23 1300     HS Troponin T 13 ng/L     BNP [[id number]]  (Abnormal) Collected: 04/17/23 1109    Specimen: Blood Updated: 04/17/23 1145     proBNP 1,249.0 pg/mL     D-dimer, Quantitative [[id number]]  (Abnormal) Collected: 04/17/23 1109    Specimen: Blood Updated: 04/17/23 1145     D-Dimer, Quantitative 1.09 MCGFEU/mL     CBC Auto Differential [[id number]]  (Abnormal) Collected: 04/17/23 1109    Specimen: Blood Updated: 04/17/23 1141     WBC 6.72 10*3/mm3      RBC 4.54 10*6/mm3      Hemoglobin 13.5 g/dL      Hematocrit 41.3 %      MCV 91.0 fL      MCH 29.7 pg      MCHC 32.7 g/dL      RDW 13.2 %      RDW-SD 43.6 fl      MPV 12.0 fL      Platelets 106 10*3/mm3      Neutrophil % 72.0 %      Lymphocyte % 16.2 %      Monocyte % 8.6 %      Eosinophil % 2.4 %      Basophil % 0.4 %      Neutrophils, Absolute 4.83 10*3/mm3      Lymphocytes, Absolute 1.09 10*3/mm3      Monocytes, Absolute 0.58 10*3/mm3      Eosinophils, Absolute 0.16 10*3/mm3      Basophils, Absolute 0.03 10*3/mm3         Imaging Results (Last 24 Hours)     Procedure Component Value Units Date/Time    CT  Angiogram Chest [[id number]] Collected: 04/17/23 1337     Updated: 04/17/23 1404    Narrative:      EXAMINATION: CT ANGIOGRAM CHEST- 4/17/2023 1:37 PM CDT     HISTORY: Acute aortic syndrome (AAS) suspected. Chest pain and shortness  of breath.     DOSE: 439 mGycm (Automatic exposure control technique was implemented in  an effort to keep the radiation dose as low as possible without  compromising image quality)     REPORT:  Spiral CT of the chest was performed after administration of intravenous  contrast from the thoracic inlet through the upper abdomen using CTA  protocol, which includes reconstructed maximum intensity projection  (MIP)coronal and sagittal images.     Comparison: CTA chest 02/23/2023.     The contrast bolus is satisfactory, there is mild respiratory cardiac  motion artifact. There is mild dilation of the central pulmonary  arteries as before, pulmonary arterial hypertension is likely. The main  pulmonary artery segment measures 4.1 cm in diameter. No filling defects  are seen in the pulmonary arteries. Heart size appears be normal and the  RV LV ratio is less than 1, normal. In cross-section, the ascending  aorta has a slightly ovoid shape, currently measuring approximately 4.5  x 4.2 cm in diameter, compared with 4.2 x 3.9 cm at the same level on  the previous scan. At the arch level, the aorta has a maximum diameter  3.1 cm, which is normal. The descending thoracic aorta has a maximum  diameter 2.9 cm which is normal. No aortic dissection or aneurysm  rupture is identified. There is previous CABG. Heavy calcification seen  within the coronary arteries, greatest at the LAD distribution and there  appear to be stents within the LAD. There is trace left pleural effusion  and a small right pleural effusion is present. Review of lung windows  demonstrates no evidence of consolidating pneumonia. There is mild  bronchial wall thickening of the lung zones, greater on the right. There  is subtle haziness  at the bronchial branch points in the right lower  lobe, including a focal nodular infiltrates seen on images 90 through 93  of series 5.. There is a benign-appearing 4 mm subpleural nodule in the  lingula image 19 series 5. In the right lower lobe, there is a  noncalcified perifissural nodule that measures 4 x 10 mm, where before  there was only a 3 mm nodule in this location. This is compared to the  infectious or inflammatory but follow-up chest CT is recommended in 3  months. There is a subpleural nodule in the right lower lobe laterally  which is unchanged, image 113 series 5. This measures 5 mm. There is  mild subpleural scarring in the lateral aspect of the right middle lobe.  No pneumothorax is identified. The visualized upper abdomen is  unremarkable except for evidence previous cholecystectomy. Review of  bone windows shows no acute abnormality.       Impression:      1. Mild bronchial wall thickening and small nodular infiltrate in the  right lower lobe without consolidation likely represents a mild degree  of bronchopneumonia. There is an associated 4 x 10 mm nodule in the  right lower lobe which has increased since this compared with the most  recent CT when it measured 3 mm. This is likely infectious or  inflammatory, repeat chest CT is recommended in 3 months however to  ensure resolution.  2. Mild aneurysmal dilation of the ascending aorta measuring 4.5 x 4.2  cm, compared with 4.2 x 3.9 cm on the previous CT, without evidence of  dissection or aneurysm rupture.  3. Dilation of the central pulmonary arteries compatible pulmonary  arterial hypertension.  4. Extensive coronary artery disease as before. There is previous median  sternotomy.  5. Trace left pleural effusion and a small right pleural effusion is  present.           This report was finalized on 04/17/2023 13:53 by Dr. Cyril Reyna MD.    XR Chest 1 View [[id number]] Collected: 04/17/23 1109     Updated: 04/17/23 1114    Narrative:       EXAMINATION: XR CHEST 1 VW- 4/17/2023 11:09 AM CDT     HISTORY: Chest Pain Triage Protocol     REPORT: A frontal view of the chest was obtained.     COMPARISON: Chest x-ray 10/20/2022.     There is volume loss in the lung bases, increased central and basilar  vascular congestion is noted in the heart is mildly enlarged. No focal  pulmonary infiltrate or consolidation is identified. There is previous  CABG. No pneumothorax or pleural effusion is seen. The osseous  structures and upper abdomen are unremarkable.       Impression:      Mild cardiomegaly and mild vascular congestion centrally  probably related to mild CHF.  This report was finalized on 04/17/2023 11:11 by Dr. Cyril Reyna MD.        11/22/2022  Conclusion of cardiac catheterization    11/9/2022        Coronary angiography:  Mild stenosis of left main coronary artery without obstructive disease  Left anterior descending coronary artery has multiple stents in the proximal and midportion after which the vessel is occluded  No obstructive disease of diagonal branch  No significant disease of main trunk of left circumflex coronary artery  First obtuse marginal branch has 50% stenosis with patent bypass graft  Second and third obtuse marginal branch does not have any obstructive disease  Patent saphenous venous graft to the obtuse marginal branch  Patent left internal mammary artery graft to the left anterior descending coronary artery  Right coronary artery has mild atherosclerotic changes in the proximal and midportion without obstructive disease.    Plan:Overall no obstructive coronary artery disease  Optimal guideline directed medical therapy    9/21/2022 Stress test with myocardial perfusion  Interpretation Summary    • Diaphragmatic attenuation artifact is present.  • Left ventricular ejection fraction is normal. (Calculated EF = 68%).  • Moderate apical and apical lateral ischemia    Results for orders placed during the hospital encounter of  04/28/21     Adult Transthoracic Echo Complete W/ Cont if Necessary Per Protocol     Interpretation Summary  · Left ventricular ejection fraction appears to be 56 - 60%. Left ventricular systolic function is normal. There appears to be mild hypokinesis of the apex and distal septal and anterior walls.  · Left ventricular wall thickness is consistent with mild to moderate concentric hypertrophy.  · Left ventricular diastolic function is consistent with (grade II w/high LAP) pseudonormalization.  · Mild aortic valve stenosis is present.  · Normal size and function of the right ventricle.    Assessment / Plan   Assessment:   Active Hospital Problems    Diagnosis    • **Acute on chronic diastolic congestive heart failure    • Unstable angina pectoris    • Aneurysm of ascending aorta without rupture    • Chronic anticoagulation    • Bilateral pleural effusion    • Paroxysmal atrial fibrillation ([facility])    • Class 2 severe obesity due to excess calories with serious comorbidity and body mass index (BMI) of 35.0 to 35.9 in adult ([facility])    • Coronary artery disease of native artery of native heart with stable angina pectoris    • Type 2 diabetes mellitus with circulatory disorder, with long-term current use of insulin ([facility])    • Seizure disorder        Treatment Plan  1.  The patient will be admitted to Dr. Carter service here at [facility].   2.  Follow heart failure pathway  3.  Consult cardiology  4.  Serial troponins, EKGs as needed  5.  Lasix 40 mg IV x2, received 80 mg in the emergency department  6.  DVT prophylaxis with ongoing Eliquis, patient received aspirin 324 mg in the emergency department  7.  Home medications reviewed and restarted as appropriate  8.  Patient received Rocephin 1 g IV in the emergency department for abnormal CT of the chest however will hold further antibiotics for now.  Patient quite reluctant due to history of C. difficile in the past  9.  Monitor glucose before meals and at bedtime  with regular insulin sliding scale coverage, patient insistent on taking Levemir 110 units every 12 hours  10.  Taper and DC Tridil as tolerated  11.  Labs in a.m.    Medical Decision Making  Number and Complexity of problems: 10  Differential Diagnosis: None    Conditions and Status        Condition is unchanged.     [facility] Data  External documents reviewed: No  Cardiac tracing (EKG, telemetry) interpretation: Reviewed  Radiology interpretation: Reviewed  Labs reviewed: Yes  Any tests that were considered but not ordered: Echocardiogram, await cardiology's recommendation     Decision rules/scores evaluated (example [id number], [place], etc): No     Discussed with: Patient, wife, Dr. Carter     Care Planning  Shared decision making: Patient, wife, Dr. Carter  Code status and discussions: Full    Disposition  Social Determinants of Health that impact treatment or disposition: None  Estimated length of stay is 2+ days.     I confirmed that the patient's advanced care plan is present, code status is documented, and a surrogate decision maker is listed in the patient's medical record.     The patient's surrogate decision maker is wife.     The patient was seen and examined by me on 4/17/2023 at 3:15 PM.    Electronically signed by TEENA Leung, 04/17/23, 18:51 CDT.

## 2023-04-17 NOTE — ED PROVIDER NOTES
Subjective   History of Present Illness  Patient is 63-year-old who came the ER complaint of chest pain shortness of breath and weight gain.    Chest Pain  Pain location:  Substernal area  Pain quality: aching and pressure    Pain radiates to:  Does not radiate  Pain severity:  Moderate  Onset quality:  Gradual  Timing:  Intermittent  Chronicity:  New  Context: at rest    Context: not breathing, not drug use, not eating, not intercourse and not lifting    Relieved by:  Nothing  Worsened by:  Exertion  Ineffective treatments:  None tried  Associated symptoms: orthopnea and shortness of breath    Associated symptoms: no abdominal pain, no anxiety, no back pain, no claudication, no cough, no dysphagia, no fatigue, no headache, no heartburn, no lower extremity edema, no nausea, no numbness, no vomiting and no weakness    Risk factors: coronary artery disease, high cholesterol, hypertension and male sex    Risk factors: no aortic disease, no Marfan's syndrome, no prior DVT/PE and no smoking    Shortness of Breath  Associated symptoms: chest pain    Associated symptoms: no abdominal pain, no claudication, no cough, no headaches, no neck pain and no vomiting        Review of Systems   Constitutional: Negative.  Negative for fatigue.   HENT: Negative.  Negative for trouble swallowing.    Respiratory: Positive for shortness of breath. Negative for cough.    Cardiovascular: Positive for chest pain and orthopnea. Negative for claudication.   Gastrointestinal: Negative.  Negative for abdominal distention, abdominal pain, heartburn, nausea and vomiting.   Endocrine: Negative.    Genitourinary: Negative.    Musculoskeletal: Negative.  Negative for back pain and neck pain.   Skin: Negative for color change and pallor.   Neurological: Negative.  Negative for syncope, weakness, light-headedness, numbness and headaches.   Hematological: Negative.  Does not bruise/bleed easily.   All other systems reviewed and are negative.      Past  Medical History:   Diagnosis Date   • Arthritis    • Asthma    • Cancer    • Carotid disease, bilateral    • Chest pain    • Chronic diastolic congestive heart failure 09/07/2019   • Chronic sinusitis    • Maribell bullosa    • Coronary artery disease involving native coronary artery of native heart with unstable angina pectoris 01/17/2017   • Deviated septum    • Diabetes mellitus    • Difficulty urinating    • Diverticulitis    • Enlarged prostate    • Fatty liver    • GERD (gastroesophageal reflux disease)    • [place] (hard of hearing)     Does have hearing aids   • Hyperlipidemia LDL goal <70 02/02/2017   • Hypertrophy of nasal turbinates    • Keratoderma    • Kidney stone    • Migraine    • Murmur, heart    • Myocardial infarction    • Obesity    • Paroxysmal atrial fibrillation 07/11/2019   • Personal history of COVID-19 07/2021   • PONV (postoperative nausea and vomiting)    • Primary hypertension 10/16/2016   • Psoriasis    • Seizures    • Sinus congestion    • Skin cancer    • Sleep apnea     not using cpap   • SOB (shortness of breath)    • Stroke    • UTI (urinary tract infection)        Allergies   Allergen Reactions   • Flagyl [Metronidazole] Hives   • Atorvastatin Other (See Comments)      - LIPITOR -   Muscle cramps   • Ciprofloxacin Hives      - CIPRO -        Past Surgical History:   Procedure Laterality Date   • CARDIAC CATHETERIZATION  01/2016    Dr. Broadbent; widely patent previously placed stents in the left anterior descending and obstructive disease involving the diagonal branch which was treated medically   • CARDIAC CATHETERIZATION N/A 07/14/2017    Procedure: Left Heart Cath;  Surgeon: Wade Ramey MD;  Location:  [facility];  Service:    • CARDIAC CATHETERIZATION Left 10/15/2018    Procedure: Cardiac Catheterization/Vascular Study;  Surgeon: Wade Ramey MD;  Location:  [facility];  Service: Cardiology   • CARDIAC CATHETERIZATION  10/15/2018    Procedure:  Functional Flow [place];  Surgeon: Wade Ramey MD;  Location:  [facility];  Service: Cardiology   • CARDIAC CATHETERIZATION N/A 10/15/2018    Procedure: Left ventriculography;  Surgeon: Wade Ramey MD;  Location:  [facility];  Service: Cardiology   • CARDIAC CATHETERIZATION Left 06/26/2019    Procedure: Cardiac Catheterization/Vascular Study VEL OK  HE WILL WAIT 1 YEAR FOR SHOULDER SURGERY ;  Surgeon: Wade Ramey MD;  Location:  [facility];  Service: Cardiology   • CARDIAC CATHETERIZATION Left 04/30/2021    Procedure: Coronary angiography;  Surgeon: Sahil Llamas MD;  Location:  [facility];  Service: Cardiology;  Laterality: Left;   • CARDIAC CATHETERIZATION N/A 04/30/2021    Procedure: Percutaneous Coronary Intervention;  Surgeon: Sahil Llamas MD;  Location:  [facility];  Service: Cardiology;  Laterality: N/A;   • CARDIAC CATHETERIZATION N/A 11/09/2022    Procedure: Left Heart Cath with SVGs;  Surgeon: Wade Ramey MD;  Location:  [facility];  Service: Cardiology;  Laterality: N/A;   • CHOLECYSTECTOMY     • CHOLECYSTECTOMY WITH INTRAOPERATIVE CHOLANGIOGRAM N/A 08/01/2018    Procedure: CHOLECYSTECTOMY LAPAROSCOPIC INTRAOPERATIVE CHOLANGIOGRAM;  Surgeon: Shane Ann MD;  Location: [facility] OR;  Service: General   • COLONOSCOPY N/A 07/14/2020    Procedure: COLONOSCOPY WITH ANESTHESIA;  Surgeon: Anupam Morales DO;  Location: [facility] ENDOSCOPY;  Service: Gastroenterology;  Laterality: N/A;  pre: abdominal pain  post: diverticulosis  Vinayak Correia PA   • CORONARY ANGIOPLASTY     • CORONARY ARTERY BYPASS GRAFT N/A 07/06/2019    Procedure: CABG X2 WITH LIMA, LEFT LEG OVH, AND PLACEMENT OF LEFT FEMORAL ARTERIAL LINE;  Surgeon: Steven Tang MD;  Location: [facility] OR;  Service: Cardiothoracic   • CORONARY STENT PLACEMENT      x 6   • CYSTOSCOPY TRANSURETHRAL RESECTION OF PROSTATE N/A  1/23/2023    Procedure: CYSTOSCOPY TRANSURETHRAL RESECTION OF PROSTATE;  Surgeon: Latrell Pope MD;  Location:  [facility] OR;  Service: Urology;  Laterality: N/A;   • ENDOSCOPIC FUNCTIONAL SINUS SURGERY (FESS) Bilateral 12/13/2017    Procedure: PROCEDURE PERFORMED:  Bilateral functional endoscopic anterior ethmoidectomy with bilateral middle meatal antrostomy Septoplasty Right kathia bullosa resection Bilateral inferior turbinate reduction via Coblation;  Surgeon: Mayank Ibarra MD;  Location:  [facility] OR;  Service:    • ENDOSCOPY N/A 07/30/2018    Procedure: ESOPHAGOGASTRODUODENOSCOPY WITH ANESTHESIA;  Surgeon: Benitez Mas MD;  Location:  [facility] ENDOSCOPY;  Service: Gastroenterology   • ENDOSCOPY N/A 07/14/2020    Procedure: ESOPHAGOGASTRODUODENOSCOPY WITH ANESTHESIA;  Surgeon: Anupam Morales DO;  Location:  [facility] ENDOSCOPY;  Service: Gastroenterology;  Laterality: N/A;  pre: abdominal pain  post: esophagitis  Vinayak Correia PA   • HERNIA REPAIR      x2 inguinal area   • KIDNEY STONE SURGERY     • KNEE ARTHROSCOPY Right 03/01/2022    Procedure: RIGHT KNEE PARTIAL LATERAL MENISCECTOMY;  Surgeon: Pedro Pablo Song MD;  Location:  [facility] OR;  Service: Orthopedics;  Laterality: Right;   • KNEE SURGERY Right    • OTHER SURGICAL HISTORY      urolift   • PROSTATE SURGERY      Dr. Badillo - 2017   • ROTATOR CUFF REPAIR Right    • SLEEP ENDOSCOPY N/A 2/27/2023    Procedure: Videosleep endoscopy;  Surgeon: Mayank Ibarra MD;  Location:  [facility] OR;  Service: ENT;  Laterality: N/A;   • THUMB AMPUTATION Left     partial   • TOE AMPUTATION Right     big       Family History   Problem Relation Age of Onset   • Heart disease Father    • COPD Mother    • Hypertension Mother    • Asthma Mother    • No Known Problems Sister    • Colon cancer Paternal Uncle    • Prostate cancer Maternal Grandfather    • No Known Problems Sister    • Colon cancer Maternal Grandmother    • Colon polyps Maternal Grandmother         Social History     Socioeconomic History   • Marital status:    Tobacco Use   • Smoking status: Former     Packs/day: 1.50     Years: 18.00     Pack years: 27.00     Types: Cigarettes, Cigars     Start date:      Quit date:      Years since quittin.3   • Smokeless tobacco: Former     Types: Chew     Quit date:    Vaping Use   • Vaping Use: Never used   Substance and Sexual Activity   • Alcohol use: No     Comment: 0   • Drug use: No   • Sexual activity: Defer           Objective   Physical Exam  Vitals and nursing note reviewed. Exam conducted with a chaperone present.   Constitutional:       General: He is not in acute distress.     Appearance: Normal appearance. He is well-developed. He is not toxic-appearing.   HENT:      Head: Normocephalic and atraumatic.      Nose: Nose normal.      Mouth/Throat:      Mouth: Mucous membranes are moist.      Pharynx: Uvula midline.   Eyes:      General: Lids are normal. Lids are everted, no foreign bodies appreciated.      Conjunctiva/sclera: Conjunctivae normal.      Pupils: Pupils are equal, round, and reactive to light.   Neck:      Vascular: Normal carotid pulses. No carotid bruit or JVD.      Trachea: Trachea and phonation normal. No tracheal deviation.   Cardiovascular:      Rate and Rhythm: Normal rate and regular rhythm.      Chest Wall: PMI is not displaced.      Pulses: Normal pulses.      Heart sounds: Normal heart sounds.     No gallop.   Pulmonary:      Effort: Pulmonary effort is normal. No tachypnea, accessory muscle usage or respiratory distress.      Breath sounds: No stridor. Examination of the right-lower field reveals rales. Examination of the left-lower field reveals rales. Rales present. No decreased breath sounds, wheezing or rhonchi.   Abdominal:      General: Bowel sounds are normal. There is no distension.      Palpations: Abdomen is soft.      Tenderness: There is no abdominal tenderness.   Musculoskeletal:          General: No swelling. Normal range of motion.      Cervical back: Full passive range of motion without pain, normal range of motion and neck supple. No rigidity.      Right lower leg: Edema present.      Left lower leg: Edema present.      Comments: Lower extremity exam bilaterally is unremarkable.  There is no right or left calf tenderness .  There is no palpable venous cord.  No obvious difference in the size of the legs.  No pitting edema.  The dorsalis pedis and posterior tibial femoral and popliteal pulses are palpable and +2 bilaterally.  Homans sign is negative   Skin:     General: Skin is warm and dry.      Capillary Refill: Capillary refill takes less than 2 seconds.      Coloration: Skin is not jaundiced or pale.      Nails: There is no clubbing.   Neurological:      General: No focal deficit present.      Mental Status: He is alert and oriented to person, place, and time.      GCS: GCS eye subscore is 4. GCS verbal subscore is 5. GCS motor subscore is 6.      Cranial Nerves: No cranial nerve deficit.      Motor: Motor function is intact.      Gait: Gait normal.      Deep Tendon Reflexes: Reflexes are normal and symmetric. Reflexes normal.   Psychiatric:         Speech: Speech normal.         Behavior: Behavior normal.         Procedures           ED Course  ED Course as of 04/17/23 1510   Mon Apr 17, 2023   1120 Sinus st t changes [TS]   1427 . Mild bronchial wall thickening and small nodular infiltrate in the  right lower lobe without consolidation likely represents a mild degree  of bronchopneumonia. There is an associated 4 x 10 mm nodule in the  right lower lobe which has increased since this compared with the most  recent CT when it measured 3 mm. This is likely infectious or  inflammatory, repeat chest CT is recommended in 3 months however to  ensure resolution.  2. Mild aneurysmal dilation of the ascending aorta measuring 4.5 x 4.2  cm, compared with 4.2 x 3.9 cm on the previous CT, without  evidence of  dissection or aneurysm rupture.  3. Dilation of the central pulmonary arteries compatible pulmonary  arterial hypertension.  4. Extensive coronary artery disease as before. There is previous median  sternotomy.  5. Trace left pleural effusion and a small right pleural effusion is  present.  These findings in the region of followed by me discussed with the patient [TS]   1504 Case were discussed with the patient and his pain is relieved with the nitroglycerin Lasix to help with shortness of breath he also has some possibly early pneumonia blood cultures and Rocephin have been given the patient.  He has been informed of pulmonary nodule the aneurysm of the ascending aorta is present without rupture. [TS]      ED Course User Index  [TS] Nj Guidry MD                                           Medical Decision Making  Differential Diagnosis:  I considered chest wall pain, muscle strain, costochondritis, pleurisy, rib fracture, herpes zoster, cardiovascular etiology, myocardial infarction, intermediate coronary syndrome, unstable angina, angina, aortic dissection, pericarditis, pulmonary etiology, pulmonary embolism, pneumonia, pneumothorax, lung cancer, gastroesophageal reflux disease, esophagitis, esophageal spasm and gastrointestinal etiology as a possible cause of chest pain in this patient. This is a partial list of diagnoses considered.      Aneurysm of ascending aorta without rupture:     Details: History of symptoms follow-up with thoracic surgery  Community acquired pneumonia, unspecified laterality: acute illness or injury     Details: Has got pulmonary infiltrate i.e. possible bronchopneumonia will treat with antibiotics.  Congestive heart failure, unspecified HF chronicity, unspecified heart failure type: acute illness or injury     Details: Mild pulmonary edema with a gain was given diuretics.  Primary hypertension: chronic illness or injury  Pulmonary nodule: chronic illness or injury      Details: Will need outpatient follow-up patient has been informed  Unstable angina pectoris: acute illness or injury     Details: Patient with unstable angina history of coronary disease was placed on nitro drip feeling much better pain is relieved.  Lab work was reviewed  Amount and/or Complexity of Data Reviewed  Labs: ordered.     Details: Lab work-up was reviewed  Radiology: ordered.     Details: CT scan findings reviewed  ECG/medicine tests: ordered.     Details: Nonspecific ST wave changes with some T wave inversion in the inferolateral leads.  Discussion of management or test interpretation with external provider(s): Discussed with the hospitalist    Risk  OTC drugs.  Prescription drug management.  Decision regarding hospitalization.    Risk Details: Well score 0    CT of the chest obtained to rule out a aortic dissection.  Heart score 5  Patient is pain-free at this time will be admitted to medicine service.        Final diagnoses:   Unstable angina pectoris   Primary hypertension   Congestive heart failure, unspecified HF chronicity, unspecified heart failure type   Community acquired pneumonia, unspecified laterality   Pulmonary nodule   Aneurysm of ascending aorta without rupture       ED Disposition  ED Disposition     ED Disposition   Decision to Admit    Condition   --    Comment   Level of Care: Telemetry [5]   Diagnosis: Unstable angina pectoris [725183]   Admitting Physician: LORA ORTEGA [[id number]]   Attending Physician: LORA ORTEGA [[id number]]   Certification: I Certify That Inpatient Hospital Services Are Medically Necessary For Greater Than 2 Midnights               No follow-up provider specified.       Medication List      No changes were made to your prescriptions during this visit.          Nj Guidry MD  04/17/23 1132       Nj Guidry MD  04/17/23 1510

## 2023-04-17 NOTE — PLAN OF CARE
Goal Outcome Evaluation:      Pt admitted from ER to room 447, States chest pain better, continues to have midsternal achyness, says not radiating to left arm as much, Nitro drip infusing, on RA, states only mild SOB, has generalized edema, IV Lasix given, BP elevated , recheck down to 150's, HR NS 60's-70's, safety maintained

## 2023-04-18 ENCOUNTER — APPOINTMENT (OUTPATIENT)
Dept: CARDIOLOGY | Facility: HOSPITAL | Age: 63
End: 2023-04-18
Payer: OTHER GOVERNMENT

## 2023-04-18 LAB
ANION GAP SERPL CALCULATED.3IONS-SCNC: 11 MMOL/L (ref 5–15)
BH CV ECHO MEAS - AO MAX PG: 14.7 MMHG
BH CV ECHO MEAS - AO MEAN PG: 8 MMHG
BH CV ECHO MEAS - AO ROOT DIAM: 3.1 CM
BH CV ECHO MEAS - AO V2 MAX: 192 CM/SEC
BH CV ECHO MEAS - AO V2 VTI: 42 CM
BH CV ECHO MEAS - AVA(I,D): 3.2 CM2
BH CV ECHO MEAS - EDV(CUBED): 61.2 ML
BH CV ECHO MEAS - EDV(MOD-SP4): 89.8 ML
BH CV ECHO MEAS - EF(MOD-SP4): 68.9 %
BH CV ECHO MEAS - ESV(CUBED): 17.6 ML
BH CV ECHO MEAS - ESV(MOD-SP4): 27.9 ML
BH CV ECHO MEAS - FS: 34 %
BH CV ECHO MEAS - IVS/LVPW: 0.9 CM
BH CV ECHO MEAS - IVSD: 1.05 CM
BH CV ECHO MEAS - LA DIMENSION: 4.7 CM
BH CV ECHO MEAS - LAT PEAK E' VEL: 6.6 CM/SEC
BH CV ECHO MEAS - LV DIASTOLIC VOL/BSA (35-75): 38.2 CM2
BH CV ECHO MEAS - LV MASS(C)D: 144.2 GRAMS
BH CV ECHO MEAS - LV MAX PG: 17.1 MMHG
BH CV ECHO MEAS - LV MEAN PG: 9 MMHG
BH CV ECHO MEAS - LV SYSTOLIC VOL/BSA (12-30): 11.9 CM2
BH CV ECHO MEAS - LV V1 MAX: 207 CM/SEC
BH CV ECHO MEAS - LV V1 VTI: 38.3 CM
BH CV ECHO MEAS - LVIDD: 3.9 CM
BH CV ECHO MEAS - LVIDS: 2.6 CM
BH CV ECHO MEAS - LVOT AREA: 3.5 CM2
BH CV ECHO MEAS - LVOT DIAM: 2.1 CM
BH CV ECHO MEAS - LVPWD: 1.17 CM
BH CV ECHO MEAS - MED PEAK E' VEL: 5 CM/SEC
BH CV ECHO MEAS - MV A MAX VEL: 69.8 CM/SEC
BH CV ECHO MEAS - MV DEC TIME: 0.3 MSEC
BH CV ECHO MEAS - MV E MAX VEL: 75.2 CM/SEC
BH CV ECHO MEAS - MV E/A: 1.08
BH CV ECHO MEAS - SI(MOD-SP4): 26.3 ML/M2
BH CV ECHO MEAS - SV(LVOT): 132.7 ML
BH CV ECHO MEAS - SV(MOD-SP4): 61.9 ML
BH CV ECHO MEASUREMENTS AVERAGE E/E' RATIO: 12.97
BUN SERPL-MCNC: 17 MG/DL (ref 8–23)
BUN/CREAT SERPL: 13.7 (ref 7–25)
CALCIUM SPEC-SCNC: 10.1 MG/DL (ref 8.6–10.5)
CHLORIDE SERPL-SCNC: 99 MMOL/L (ref 98–107)
CHOLEST SERPL-MCNC: 105 MG/DL (ref 0–200)
CO2 SERPL-SCNC: 26 MMOL/L (ref 22–29)
CREAT SERPL-MCNC: 1.24 MG/DL (ref 0.76–1.27)
DEPRECATED RDW RBC AUTO: 44.7 FL (ref 37–54)
EGFRCR SERPLBLD CKD-EPI 2021: 64.5 ML/MIN/1.73
ERYTHROCYTE [DISTWIDTH] IN BLOOD BY AUTOMATED COUNT: 13.4 % (ref 12.3–15.4)
GLUCOSE BLDC GLUCOMTR-MCNC: 166 MG/DL (ref 70–130)
GLUCOSE BLDC GLUCOMTR-MCNC: 167 MG/DL (ref 70–130)
GLUCOSE BLDC GLUCOMTR-MCNC: 176 MG/DL (ref 70–130)
GLUCOSE BLDC GLUCOMTR-MCNC: 254 MG/DL (ref 70–130)
GLUCOSE SERPL-MCNC: 303 MG/DL (ref 65–99)
HBA1C MFR BLD: 7.1 % (ref 4.8–5.6)
HCT VFR BLD AUTO: 42.1 % (ref 37.5–51)
HDLC SERPL-MCNC: 29 MG/DL (ref 40–60)
HGB BLD-MCNC: 13.4 G/DL (ref 13–17.7)
LDLC SERPL CALC-MCNC: 48 MG/DL (ref 0–100)
LDLC/HDLC SERPL: 1.5 {RATIO}
LEFT ATRIUM VOLUME INDEX: 33.4 ML/M2
LEFT ATRIUM VOLUME: 78.5 ML
MAXIMAL PREDICTED HEART RATE: 155 BPM
MCH RBC QN AUTO: 29.1 PG (ref 26.6–33)
MCHC RBC AUTO-ENTMCNC: 31.8 G/DL (ref 31.5–35.7)
MCV RBC AUTO: 91.3 FL (ref 79–97)
PLATELET # BLD AUTO: 112 10*3/MM3 (ref 140–450)
PMV BLD AUTO: 11.6 FL (ref 6–12)
POTASSIUM SERPL-SCNC: 4.1 MMOL/L (ref 3.5–5.2)
RBC # BLD AUTO: 4.61 10*6/MM3 (ref 4.14–5.8)
SODIUM SERPL-SCNC: 136 MMOL/L (ref 136–145)
STRESS TARGET HR: 132 BPM
TRIGL SERPL-MCNC: 163 MG/DL (ref 0–150)
VLDLC SERPL-MCNC: 28 MG/DL (ref 5–40)
WBC NRBC COR # BLD: 5.72 10*3/MM3 (ref 3.4–10.8)

## 2023-04-18 PROCEDURE — 25010000002 FUROSEMIDE PER 20 MG: Performed by: NURSE PRACTITIONER

## 2023-04-18 PROCEDURE — 63710000001 INSULIN DETEMIR PER 5 UNITS: Performed by: NURSE PRACTITIONER

## 2023-04-18 PROCEDURE — 93306 TTE W/DOPPLER COMPLETE: CPT | Performed by: INTERNAL MEDICINE

## 2023-04-18 PROCEDURE — 99222 1ST HOSP IP/OBS MODERATE 55: CPT | Performed by: INTERNAL MEDICINE

## 2023-04-18 PROCEDURE — 83036 HEMOGLOBIN GLYCOSYLATED A1C: CPT | Performed by: NURSE PRACTITIONER

## 2023-04-18 PROCEDURE — 80061 LIPID PANEL: CPT | Performed by: NURSE PRACTITIONER

## 2023-04-18 PROCEDURE — 82962 GLUCOSE BLOOD TEST: CPT

## 2023-04-18 PROCEDURE — 80048 BASIC METABOLIC PNL TOTAL CA: CPT | Performed by: NURSE PRACTITIONER

## 2023-04-18 PROCEDURE — 93306 TTE W/DOPPLER COMPLETE: CPT

## 2023-04-18 PROCEDURE — 63710000001 INSULIN LISPRO (HUMAN) PER 5 UNITS: Performed by: NURSE PRACTITIONER

## 2023-04-18 PROCEDURE — 25510000001 PERFLUTREN 6.52 MG/ML SUSPENSION: Performed by: INTERNAL MEDICINE

## 2023-04-18 PROCEDURE — 85027 COMPLETE CBC AUTOMATED: CPT | Performed by: NURSE PRACTITIONER

## 2023-04-18 RX ORDER — SPIRONOLACTONE 50 MG/1
50 TABLET, FILM COATED ORAL DAILY
Status: DISCONTINUED | OUTPATIENT
Start: 2023-04-19 | End: 2023-04-19 | Stop reason: HOSPADM

## 2023-04-18 RX ORDER — TAMSULOSIN HYDROCHLORIDE 0.4 MG/1
2 CAPSULE ORAL NIGHTLY
COMMUNITY

## 2023-04-18 RX ORDER — LEVETIRACETAM 500 MG/1
1500 TABLET ORAL EVERY MORNING
COMMUNITY

## 2023-04-18 RX ORDER — GABAPENTIN 300 MG/1
300 CAPSULE ORAL NIGHTLY
COMMUNITY

## 2023-04-18 RX ORDER — SPIRONOLACTONE 50 MG/1
50 TABLET, FILM COATED ORAL DAILY
COMMUNITY

## 2023-04-18 RX ORDER — SACUBITRIL AND VALSARTAN 97; 103 MG/1; MG/1
1 TABLET, FILM COATED ORAL 2 TIMES DAILY
COMMUNITY

## 2023-04-18 RX ORDER — ACETAMINOPHEN 325 MG/1
325 TABLET ORAL EVERY 6 HOURS PRN
COMMUNITY

## 2023-04-18 RX ORDER — LEVETIRACETAM 500 MG/1
2000 TABLET ORAL NIGHTLY
COMMUNITY

## 2023-04-18 RX ADMIN — LEVETIRACETAM 2000 MG: 500 TABLET, FILM COATED ORAL at 18:08

## 2023-04-18 RX ADMIN — METOPROLOL TARTRATE 100 MG: 50 TABLET, FILM COATED ORAL at 21:26

## 2023-04-18 RX ADMIN — Medication 10 ML: at 10:12

## 2023-04-18 RX ADMIN — LAMOTRIGINE 200 MG: 100 TABLET ORAL at 09:03

## 2023-04-18 RX ADMIN — SACUBITRIL AND VALSARTAN 1 TABLET: 49; 51 TABLET, FILM COATED ORAL at 09:07

## 2023-04-18 RX ADMIN — SACUBITRIL AND VALSARTAN 2 TABLET: 49; 51 TABLET, FILM COATED ORAL at 21:30

## 2023-04-18 RX ADMIN — LAMOTRIGINE 200 MG: 100 TABLET ORAL at 21:26

## 2023-04-18 RX ADMIN — PANTOPRAZOLE SODIUM 40 MG: 40 TABLET, DELAYED RELEASE ORAL at 18:08

## 2023-04-18 RX ADMIN — PANTOPRAZOLE SODIUM 40 MG: 40 TABLET, DELAYED RELEASE ORAL at 10:11

## 2023-04-18 RX ADMIN — FUROSEMIDE 40 MG: 10 INJECTION, SOLUTION INTRAVENOUS at 10:11

## 2023-04-18 RX ADMIN — INSULIN LISPRO 4 UNITS: 100 INJECTION, SOLUTION INTRAVENOUS; SUBCUTANEOUS at 18:08

## 2023-04-18 RX ADMIN — ASPIRIN 81 MG: 81 TABLET, COATED ORAL at 09:05

## 2023-04-18 RX ADMIN — GUAIFENESIN 600 MG: 600 TABLET, EXTENDED RELEASE ORAL at 09:06

## 2023-04-18 RX ADMIN — APIXABAN 5 MG: 5 TABLET, FILM COATED ORAL at 09:06

## 2023-04-18 RX ADMIN — EMPAGLIFLOZIN 10 MG: 10 TABLET, FILM COATED ORAL at 09:08

## 2023-04-18 RX ADMIN — GABAPENTIN 300 MG: 300 CAPSULE ORAL at 21:26

## 2023-04-18 RX ADMIN — INSULIN DETEMIR 110 UNITS: 100 INJECTION, SOLUTION SUBCUTANEOUS at 09:20

## 2023-04-18 RX ADMIN — Medication 10 ML: at 21:27

## 2023-04-18 RX ADMIN — SPIRONOLACTONE 50 MG: 50 TABLET ORAL at 09:04

## 2023-04-18 RX ADMIN — INSULIN DETEMIR 55 UNITS: 100 INJECTION, SOLUTION SUBCUTANEOUS at 21:28

## 2023-04-18 RX ADMIN — GUAIFENESIN 600 MG: 600 TABLET, EXTENDED RELEASE ORAL at 21:26

## 2023-04-18 RX ADMIN — APIXABAN 5 MG: 5 TABLET, FILM COATED ORAL at 21:26

## 2023-04-18 RX ADMIN — METOPROLOL TARTRATE 100 MG: 50 TABLET, FILM COATED ORAL at 09:04

## 2023-04-18 RX ADMIN — Medication 250 MG: at 21:26

## 2023-04-18 RX ADMIN — ROSUVASTATIN CALCIUM 20 MG: 20 TABLET, FILM COATED ORAL at 21:26

## 2023-04-18 RX ADMIN — INSULIN LISPRO 12 UNITS: 100 INJECTION, SOLUTION INTRAVENOUS; SUBCUTANEOUS at 10:16

## 2023-04-18 RX ADMIN — LEVETIRACETAM 1500 MG: 500 TABLET, FILM COATED ORAL at 10:11

## 2023-04-18 RX ADMIN — PERFLUTREN 8.48 MG: 6.52 INJECTION, SUSPENSION INTRAVENOUS at 14:49

## 2023-04-18 RX ADMIN — Medication 250 MG: at 09:07

## 2023-04-18 RX ADMIN — INSULIN LISPRO 4 UNITS: 100 INJECTION, SOLUTION INTRAVENOUS; SUBCUTANEOUS at 21:27

## 2023-04-18 RX ADMIN — ISOSORBIDE MONONITRATE 120 MG: 60 TABLET, EXTENDED RELEASE ORAL at 09:06

## 2023-04-18 NOTE — CONSULTS
Patient Care Team:  Vinayak Correia PA as PCP - General (Physician Assistant)  Vinayak Correia PA as PCP - Family Medicine  Wade Ramey MD as Cardiologist (Cardiology)  Sumanth Badillo MD (Inactive) as Consulting Physician (Urology)  Cyril Blake MD as Referring Physician (Otolaryngology)  Mayank Ibarra MD as Consulting Physician (Otolaryngology)  Hamilton White MD as Consulting Physician (Neurology)  Steven Tang MD as Surgeon (Cardiothoracic Surgery)  Agnieszka Jeff APRN as Nurse Practitioner (Family Medicine)  No Known Provider     REASON FOR REFERRAL: CHF   Chief complaint : shortness of breath, chest pressure     Subjective     Patient is a 63 y.o. male presents with chest pressure and shortness of breath. He reports increased swelling x 1 week, and increased dyspnea and chest pressure x 3 days with weight gain (he reports a dry weight of 253-255 and says he was 267 at home prior to arrival yesterday), cough with yellow sputum production and orthopnea. He has some associated nausea and radiation to left shoulder. Pressure has been constant since Sunday afternoon. He denies fever, chills.    He saw Dr. Ramey in clinic Friday and he ordered an echo, cardiomems placement, and increased aldactone to 50 mg daily.     He reports compliance with meds and heart healthy diet. He states on average his sbp is 120s.    BNP is elevated. Troponins 13, 18, 13. Maintaining NSR on tele- EKG abnormalities noted in plan below. BNP mildly elevated.    So far he has received 2 doses of IV lasix 40 mg and is net negative nearly 4 L. He states he had improvement with both lasix and Tridil gtt on arrival (turned off around 2 am) but diuresis seemed to help him more than anything.    He states his current symptoms are unlike what he experienced prior to PCI and CABG in the past.    Last cath was 11/2022 - no evidence of obstructive disease per Dr. Ramey's report at that time.    Last echo  4/2021 - normal LVEF, grade II DD, mild AS    He is also followed for AF, on Eliquis. He is maintaining NSR this admission.     No evidence of PE on CTA but has possible PNA. Also has known ascending aortic aneurysm which appears slightly increased in size.    Review of Systems   Review of Systems   Constitutional: Positive for unexpected weight change. Negative for diaphoresis, fatigue and fever.   HENT: Negative for nosebleeds.    Respiratory: Positive for cough (with sputum production ) and shortness of breath (orthopnea ). Negative for apnea, chest tightness and wheezing.    Cardiovascular: Positive for chest pain and leg swelling. Negative for palpitations.   Gastrointestinal: Positive for nausea. Negative for abdominal distention and vomiting.   Genitourinary: Negative for hematuria.   Musculoskeletal: Negative for gait problem.   Skin: Negative for color change.   Neurological: Negative for dizziness, syncope, weakness and light-headedness.       History  Past Medical History:   Diagnosis Date   • Arthritis    • Asthma    • Cancer    • Carotid disease, bilateral    • Chest pain    • Chronic diastolic congestive heart failure 09/07/2019   • Chronic sinusitis    • Maribell bullosa    • Coronary artery disease involving native coronary artery of native heart with unstable angina pectoris 01/17/2017   • Deviated septum    • Diabetes mellitus    • Difficulty urinating    • Diverticulitis    • Enlarged prostate    • Fatty liver    • GERD (gastroesophageal reflux disease)    • [place] (hard of hearing)     Does have hearing aids   • Hyperlipidemia LDL goal <70 02/02/2017   • Hypertrophy of nasal turbinates    • Keratoderma    • Kidney stone    • Migraine    • Murmur, heart    • Myocardial infarction    • Obesity    • Paroxysmal atrial fibrillation 07/11/2019   • Personal history of COVID-19 07/2021   • PONV (postoperative nausea and vomiting)    • Primary hypertension 10/16/2016   • Psoriasis    • Seizures    • Sinus  congestion    • Skin cancer    • Sleep apnea     not using cpap   • SOB (shortness of breath)    • Stroke    • UTI (urinary tract infection)      Past Surgical History:   Procedure Laterality Date   • CARDIAC CATHETERIZATION  01/2016    Dr. Broadbent; widely patent previously placed stents in the left anterior descending and obstructive disease involving the diagonal branch which was treated medically   • CARDIAC CATHETERIZATION N/A 07/14/2017    Procedure: Left Heart Cath;  Surgeon: Wade Ramey MD;  Location:  [facility];  Service:    • CARDIAC CATHETERIZATION Left 10/15/2018    Procedure: Cardiac Catheterization/Vascular Study;  Surgeon: Wade Ramey MD;  Location:  [facility];  Service: Cardiology   • CARDIAC CATHETERIZATION  10/15/2018    Procedure: Functional Flow [place];  Surgeon: Wade Ramey MD;  Location:  [facility];  Service: Cardiology   • CARDIAC CATHETERIZATION N/A 10/15/2018    Procedure: Left ventriculography;  Surgeon: Wade Ramey MD;  Location:  [facility];  Service: Cardiology   • CARDIAC CATHETERIZATION Left 06/26/2019    Procedure: Cardiac Catheterization/Vascular Study VEL OK  HE WILL WAIT 1 YEAR FOR SHOULDER SURGERY ;  Surgeon: Wade Ramey MD;  Location:  [facility];  Service: Cardiology   • CARDIAC CATHETERIZATION Left 04/30/2021    Procedure: Coronary angiography;  Surgeon: Sahil Llamas MD;  Location:  [facility];  Service: Cardiology;  Laterality: Left;   • CARDIAC CATHETERIZATION N/A 04/30/2021    Procedure: Percutaneous Coronary Intervention;  Surgeon: Sahil Llamas MD;  Location:  [facility];  Service: Cardiology;  Laterality: N/A;   • CARDIAC CATHETERIZATION N/A 11/09/2022    Procedure: Left Heart Cath with SVGs;  Surgeon: Wade Ramey MD;  Location:  [facility];  Service: Cardiology;  Laterality: N/A;   • CHOLECYSTECTOMY     •  CHOLECYSTECTOMY WITH INTRAOPERATIVE CHOLANGIOGRAM N/A 08/01/2018    Procedure: CHOLECYSTECTOMY LAPAROSCOPIC INTRAOPERATIVE CHOLANGIOGRAM;  Surgeon: Shane Ann MD;  Location: [facility] OR;  Service: General   • COLONOSCOPY N/A 07/14/2020    Procedure: COLONOSCOPY WITH ANESTHESIA;  Surgeon: Anupam Morales DO;  Location: [facility] ENDOSCOPY;  Service: Gastroenterology;  Laterality: N/A;  pre: abdominal pain  post: diverticulosis  Vinayak Correia PA   • CORONARY ANGIOPLASTY     • CORONARY ARTERY BYPASS GRAFT N/A 07/06/2019    Procedure: CABG X2 WITH LIMA, LEFT LEG OVH, AND PLACEMENT OF LEFT FEMORAL ARTERIAL LINE;  Surgeon: Steven Tang MD;  Location: [facility] OR;  Service: Cardiothoracic   • CORONARY STENT PLACEMENT      x 6   • CYSTOSCOPY TRANSURETHRAL RESECTION OF PROSTATE N/A 1/23/2023    Procedure: CYSTOSCOPY TRANSURETHRAL RESECTION OF PROSTATE;  Surgeon: Latrell Pope MD;  Location: [facility] OR;  Service: Urology;  Laterality: N/A;   • ENDOSCOPIC FUNCTIONAL SINUS SURGERY (FESS) Bilateral 12/13/2017    Procedure: PROCEDURE PERFORMED:  Bilateral functional endoscopic anterior ethmoidectomy with bilateral middle meatal antrostomy Septoplasty Right kathia bullosa resection Bilateral inferior turbinate reduction via Coblation;  Surgeon: Mayank Ibarra MD;  Location: [facility] OR;  Service:    • ENDOSCOPY N/A 07/30/2018    Procedure: ESOPHAGOGASTRODUODENOSCOPY WITH ANESTHESIA;  Surgeon: Benitez Mas MD;  Location: [facility] ENDOSCOPY;  Service: Gastroenterology   • ENDOSCOPY N/A 07/14/2020    Procedure: ESOPHAGOGASTRODUODENOSCOPY WITH ANESTHESIA;  Surgeon: Anupam Morales DO;  Location: [facility] ENDOSCOPY;  Service: Gastroenterology;  Laterality: N/A;  pre: abdominal pain  post: esophagitis  Vinayak Correia PA   • HERNIA REPAIR      x2 inguinal area   • KIDNEY STONE SURGERY     • KNEE ARTHROSCOPY Right 03/01/2022    Procedure: RIGHT KNEE PARTIAL LATERAL MENISCECTOMY;  Surgeon: Pedro Pablo Song  MD [place];  Location:  [facility] OR;  Service: Orthopedics;  Laterality: Right;   • KNEE SURGERY Right    • OTHER SURGICAL HISTORY      urolift   • PROSTATE SURGERY      Dr. Badillo -    • ROTATOR CUFF REPAIR Right    • SLEEP ENDOSCOPY N/A 2023    Procedure: Videosleep endoscopy;  Surgeon: Mayank Ibarra MD;  Location:  [facility] OR;  Service: ENT;  Laterality: N/A;   • THUMB AMPUTATION Left     partial   • TOE AMPUTATION Right     big     Family History   Problem Relation Age of Onset   • Heart disease Father    • COPD Mother    • Hypertension Mother    • Asthma Mother    • No Known Problems Sister    • Colon cancer Paternal Uncle    • Prostate cancer Maternal Grandfather    • No Known Problems Sister    • Colon cancer Maternal Grandmother    • Colon polyps Maternal Grandmother      Social History     Tobacco Use   • Smoking status: Former     Packs/day: 1.50     Years: 18.00     Pack years: 27.00     Types: Cigarettes, Cigars     Start date:      Quit date:      Years since quittin.3   • Smokeless tobacco: Former     Types: Chew     Quit date:    Vaping Use   • Vaping Use: Never used   Substance Use Topics   • Alcohol use: No     Comment: 0   • Drug use: No     Medications Prior to Admission   Medication Sig Dispense Refill Last Dose   • acetaminophen (TYLENOL) 325 MG tablet Take 1 tablet by mouth Every 6 (Six) Hours As Needed for Mild Pain.      • albuterol (PROVENTIL HFA;VENTOLIN HFA) 108 (90 BASE) MCG/ACT inhaler Inhale 2 puffs Every 6 (Six) Hours As Needed for Wheezing.      • apixaban (ELIQUIS) 5 MG tablet tablet Take 1 tablet by mouth 2 (Two) Times a Day.      • Aspirin 81 MG capsule Chew 81 mg Daily.      • calcium polycarbophil (FIBERCON) 625 MG tablet Take 1 tablet by mouth Daily As Needed.      • carboxymethylcellulose (REFRESH PLUS) 0.5 % solution Administer 1 drop to both eyes 4 (Four) Times a Day As Needed for Dry Eyes.      • empagliflozin (JARDIANCE) 10 MG tablet tablet Take 1  tablet by mouth Daily.      • ezetimibe (ZETIA) 10 MG tablet Take 1 tablet by mouth Daily.      • fluticasone (FLONASE) 50 MCG/ACT nasal spray 2 sprays into the nostril(s) as directed by provider Daily As Needed for Rhinitis or Allergies.      • guaiFENesin (MUCINEX) 600 MG 12 hr tablet Take 2 tablets by mouth Daily As Needed for Congestion.      • insulin detemir (LEVEMIR) 100 UNIT/ML injection Inject 110 Units under the skin into the appropriate area as directed 2 (Two) Times a Day.      • isosorbide mononitrate (IMDUR) 120 MG 24 hr tablet Take 1 tablet by mouth Daily. 90 tablet 3    • lamoTRIgine (LaMICtal) 200 MG tablet Take 1 tablet by mouth 2 (Two) Times a Day. 180 tablet 1    • metFORMIN (GLUCOPHAGE) 1000 MG tablet Take 1 tablet by mouth 2 (Two) Times a Day With Meals.      • metoprolol tartrate (LOPRESSOR) 100 MG tablet Take 1 tablet by mouth 2 (Two) Times a Day. Told to take the DOS-2/27/23, w/ sip of water.      • Multiple Vitamin (MULTI VITAMIN PO) Take 1 tablet by mouth Daily.      • nitroglycerin (NITROSTAT) 0.4 MG SL tablet Place 1 tablet under the tongue Every 5 (Five) Minutes As Needed for Chest Pain. Take no more than 3 doses in 15 minutes.      • pantoprazole (PROTONIX) 40 MG EC tablet Take 1 tablet by mouth 2 (Two) Times a Day.      • psyllium (METAMUCIL) 58.6 % packet Take 1 packet by mouth Daily As Needed (constipation).      • Rimegepant Sulfate (Nurtec) 75 MG tablet dispersible tablet Take 1 tablet by mouth As Needed (Migraine). 8 tablet 5    • rosuvastatin (CRESTOR) 20 MG tablet Take 1 tablet by mouth Every Night.      • sacubitril-valsartan (Entresto)  MG tablet Take 1 tablet by mouth 2 (Two) Times a Day.      • spironolactone (ALDACTONE) 50 MG tablet Take 1 tablet by mouth Daily.      • tamsulosin (FLOMAX) 0.4 MG capsule 24 hr capsule Take 2 capsules by mouth Every Night.      • gabapentin (NEURONTIN) 300 MG capsule Take 1 capsule by mouth Every Night.      • insulin aspart (novoLOG  FLEXPEN) 100 UNIT/ML solution pen-injector sc pen Inject 44 Units under the skin into the appropriate area as directed 3 (Three) Times a Day With Meals. Can use 5 to 6 more units if needed in early evening.      • levETIRAcetam (KEPPRA) 500 MG tablet Take 3 tablets by mouth Every Morning.      • levETIRAcetam (KEPPRA) 500 MG tablet Take 4 tablets by mouth Every Night.          Current Facility-Administered Medications:   •  acetaminophen (TYLENOL) tablet 650 mg, 650 mg, Oral, Q4H PRN **OR** acetaminophen (TYLENOL) 160 MG/5ML solution 650 mg, 650 mg, Oral, Q4H PRN **OR** acetaminophen (TYLENOL) suppository 650 mg, 650 mg, Rectal, Q4H PRN, Ashtyn Bekcman, APRN  •  apixaban (ELIQUIS) tablet 5 mg, 5 mg, Oral, Q12H, Ashtyn Beckman, APRN, 5 mg at 04/18/23 0906  •  aspirin EC tablet 81 mg, 81 mg, Oral, Daily, Ashtyn Beckman, APRN, 81 mg at 04/18/23 0905  •  dextrose (D50W) (25 g/50 mL) IV injection 25 g, 25 g, Intravenous, Q15 Min PRN, Ashtyn Beckman, APRN  •  dextrose (GLUTOSE) oral gel 15 g, 15 g, Oral, Q15 Min PRN, Ashtyn Beckman, APRN  •  docusate sodium (COLACE) capsule 100 mg, 100 mg, Oral, BID, Ashtyn Beckman, APRN  •  empagliflozin (JARDIANCE) tablet 10 mg, 10 mg, Oral, Daily, Ashtyn Beckman, APRN, 10 mg at 04/18/23 0908  •  fluticasone (FLONASE) 50 MCG/ACT nasal spray 2 spray, 2 spray, Nasal, Daily PRN, Ashtyn Beckman, APRN  •  gabapentin (NEURONTIN) capsule 300 mg, 300 mg, Oral, Nightly, Ashtyn Beckman, APRN, 300 mg at 04/17/23 2104  •  glucagon (human recombinant) (GLUCAGEN DIAGNOSTIC) injection 1 mg, 1 mg, Intramuscular, Q15 Min PRN, Ashtyn Beckman, APRN  •  guaiFENesin (MUCINEX) 12 hr tablet 600 mg, 600 mg, Oral, Q12H, Ashtyn Beckman, APRN, 600 mg at 04/18/23 0906  •  insulin detemir (LEVEMIR) injection 110 Units, 110 Units, Subcutaneous, Q12H, Ashtyn Beckman, APRBETO, 110 Units at 04/18/23 0920  •  Insulin Lispro (humaLOG) injection 0-24 Units, 0-24 Units, Subcutaneous, 4x  Daily With Meals & Nightly, Ashtyn Beckman APRN, 12 Units at 04/18/23 1016  •  isosorbide mononitrate (IMDUR) 24 hr tablet 120 mg, 120 mg, Oral, Daily, Ashtyn Beckman APRN, 120 mg at 04/18/23 0906  •  lamoTRIgine (LaMICtal) tablet 200 mg, 200 mg, Oral, BID, Ashtyn Beckman APRN, 200 mg at 04/18/23 0903  •  levETIRAcetam (KEPPRA) tablet 1,500 mg, 1,500 mg, Oral, QAM, Ashtyn Beckman APRN, 1,500 mg at 04/18/23 1011  •  levETIRAcetam (KEPPRA) tablet 2,000 mg, 2,000 mg, Oral, Q PM, Ranjit Carter DO, 2,000 mg at 04/17/23 2102  •  metoprolol tartrate (LOPRESSOR) tablet 100 mg, 100 mg, Oral, BID, Ashtyn Beckman APRN, 100 mg at 04/18/23 0904  •  nitroglycerin (NITROSTAT) SL tablet 0.4 mg, 0.4 mg, Sublingual, Q5 Min PRN, Ashtyn Beckman APRN  •  nitroglycerin (TRIDIL) 200 mcg/ml infusion, 5-200 mcg/min, Intravenous, Titrated, Nj Guidry MD, Stopped at 04/18/23 0252  •  pantoprazole (PROTONIX) EC tablet 40 mg, 40 mg, Oral, BID AC, Ashtyn Beckman APRN, 40 mg at 04/18/23 1011  •  rosuvastatin (CRESTOR) tablet 20 mg, 20 mg, Oral, Nightly, Ashtyn Beckman APRN, 20 mg at 04/17/23 2103  •  saccharomyces boulardii (FLORASTOR) capsule 250 mg, 250 mg, Oral, BID, Ashtyn Beckman APRN, 250 mg at 04/18/23 0907  •  sacubitril-valsartan (ENTRESTO) 49-51 MG tablet 2 tablet, 2 tablet, Oral, Q12H, Knees, Amada BATISTA, APRN  •  sennosides-docusate (PERICOLACE) 8.6-50 MG per tablet 1 tablet, 1 tablet, Oral, Nightly PRN, Ashtyn Beckman, APRN  •  sodium chloride 0.9 % flush 10 mL, 10 mL, Intravenous, PRN, Nj Guidry MD  •  sodium chloride 0.9 % flush 10 mL, 10 mL, Intravenous, Q12H, Ashtyn Beckman, APRN, 10 mL at 04/18/23 1012  •  sodium chloride 0.9 % flush 10 mL, 10 mL, Intravenous, PRN, Ashtyn Beckman, APRN  •  sodium chloride 0.9 % infusion 40 mL, 40 mL, Intravenous, PRN, Ashtyn Beckman, APRN  •  [START ON 4/19/2023] spironolactone (ALDACTONE) tablet 50 mg, 50 mg, Oral, Daily, Knees, Amada E,  APRN  Allergies:  Flagyl [metronidazole], Atorvastatin, and Ciprofloxacin    Objective     Vital Signs  Temp:  [97.9 °F (36.6 °C)-98.5 °F (36.9 °C)] 98.5 °F (36.9 °C)  Heart Rate:  [63-73] 67  Resp:  [15-19] 18  BP: (117-177)/(56-99) 117/65    Physical Exam:   Vitals and nursing note reviewed.   Constitutional:       General: Not in acute distress.     Appearance: Well-developed and not in distress. Not diaphoretic.   Neck:      Vascular: No JVD.   Pulmonary:      Effort: Pulmonary effort is normal. No respiratory distress.      Breath sounds: Normal breath sounds.   Cardiovascular:      Normal rate. Regular rhythm.      Murmurs: There is a grade 2/6 systolic murmur.   Edema:     Peripheral edema absent.   Abdominal:      Tenderness: There is no abdominal tenderness.   Skin:     General: Skin is warm and dry.      Comments: Dry, flaky    Neurological:      Mental Status: Alert and oriented to person, place, and time.       Results Review:     Lab Results (last 72 hours)     Procedure Component Value Units Date/Time    POC Glucose Once [[id number]]  (Abnormal) Collected: 04/18/23 0908    Specimen: Blood Updated: 04/18/23 0919     Glucose 254 mg/dL      Comment: : [id number] Gomez (Gleason) KaylieMeter ID: [id number]       Hemoglobin A1c [[id number]]  (Abnormal) Collected: 04/18/23 0340    Specimen: Blood Updated: 04/18/23 0448     Hemoglobin A1C 7.10 %     Narrative:      Hemoglobin A1C Ranges:    Increased Risk for Diabetes  5.7% to 6.4%  Diabetes                     >= 6.5%  Diabetic Goal                < 7.0%    Basic Metabolic Panel [[id number]]  (Abnormal) Collected: 04/18/23 0340    Specimen: Blood Updated: 04/18/23 0433     Glucose 303 mg/dL      BUN 17 mg/dL      Creatinine 1.24 mg/dL      Sodium 136 mmol/L      Potassium 4.1 mmol/L      Chloride 99 mmol/L      CO2 26.0 mmol/L      Calcium 10.1 mg/dL      BUN/Creatinine Ratio 13.7     Anion Gap 11.0 mmol/L      eGFR 64.5 mL/min/1.73     Narrative:      GFR  Normal >60  Chronic Kidney Disease <60  Kidney Failure <15      Lipid Panel [[id number]]  (Abnormal) Collected: 04/18/23 0340    Specimen: Blood Updated: 04/18/23 0433     Total Cholesterol 105 mg/dL      Triglycerides 163 mg/dL      HDL Cholesterol 29 mg/dL      LDL Cholesterol  48 mg/dL      VLDL Cholesterol 28 mg/dL      LDL/HDL Ratio 1.50    Narrative:      Cholesterol Reference Ranges  (U.S. Department of Health and Human Services ATP III Classifications)    Desirable          <200 mg/dL  Borderline High    200-239 mg/dL  High Risk          >240 mg/dL      Triglyceride Reference Ranges  (U.S. Department of Health and Human Services ATP III Classifications)    Normal           <150 mg/dL  Borderline High  150-199 mg/dL  High             200-499 mg/dL  Very High        >500 mg/dL    HDL Reference Ranges  (U.S. Department of Health and Human Services ATP III Classifications)    Low     <40 mg/dl (major risk factor for CHD)  High    >60 mg/dl ('negative' risk factor for CHD)        LDL Reference Ranges  (U.S. Department of Health and Human Services ATP III Classifications)    Optimal          <100 mg/dL  Near Optimal     100-129 mg/dL  Borderline High  130-159 mg/dL  High             160-189 mg/dL  Very High        >189 mg/dL    CBC (No Diff) [[id number]]  (Abnormal) Collected: 04/18/23 0340    Specimen: Blood Updated: 04/18/23 0417     WBC 5.72 10*3/mm3      RBC 4.61 10*6/mm3      Hemoglobin 13.4 g/dL      Hematocrit 42.1 %      MCV 91.3 fL      MCH 29.1 pg      MCHC 31.8 g/dL      RDW 13.4 %      RDW-SD 44.7 fl      MPV 11.6 fL      Platelets 112 10*3/mm3     POC Glucose Once [[id number]]  (Abnormal) Collected: 04/17/23 2101    Specimen: Blood Updated: 04/17/23 2113     Glucose 234 mg/dL      Comment: : [id number] Pete AllisonMeter ID: [id number]       High Sensitivity Troponin T [[id number]]  (Normal) Collected: 04/17/23 1817    Specimen: Blood Updated: 04/17/23 1846     HS Troponin T 13 ng/L     Narrative:       High Sensitive Troponin T Reference Range:  <10.0 ng/L- Negative Female for AMI  <15.0 ng/L- Negative Male for AMI  >=10 - Abnormal Female indicating possible myocardial injury.  >=15 - Abnormal Male indicating possible myocardial injury.   Clinicians would have to utilize clinical acumen, EKG, Troponin, and serial changes to determine if it is an Acute Myocardial Infarction or myocardial injury due to an underlying chronic condition.         POC Glucose Once [[id number]]  (Abnormal) Collected: 04/17/23 1610    Specimen: Blood Updated: 04/17/23 1621     Glucose 146 mg/dL      Comment: : [id number] Kassi Araujo LMeter ID: [id number]       High Sensitivity Troponin T 2Hr [[id number]]  (Abnormal) Collected: 04/17/23 1530    Specimen: Blood Updated: 04/17/23 1600     HS Troponin T 18 ng/L      Troponin T Delta 5 ng/L     Narrative:      High Sensitive Troponin T Reference Range:  <10.0 ng/L- Negative Female for AMI  <15.0 ng/L- Negative Male for AMI  >=10 - Abnormal Female indicating possible myocardial injury.  >=15 - Abnormal Male indicating possible myocardial injury.   Clinicians would have to utilize clinical acumen, EKG, Troponin, and serial changes to determine if it is an Acute Myocardial Infarction or myocardial injury due to an underlying chronic condition.         Blood Culture - Blood, Arm, Left [[id number]] Collected: 04/17/23 1446    Specimen: Blood from Arm, Left Updated: 04/17/23 1540    Blood Culture - Blood, Hand, Right [[id number]] Collected: 04/17/23 1530    Specimen: Blood from Hand, Right Updated: 04/17/23 1540    Comprehensive Metabolic Panel [[id number]]  (Abnormal) Collected: 04/17/23 1220    Specimen: Blood Updated: 04/17/23 1307     Glucose 230 mg/dL      BUN 12 mg/dL      Creatinine 0.94 mg/dL      Sodium 135 mmol/L      Potassium 4.3 mmol/L      Comment: Slight hemolysis detected by analyzer. Results may be affected.        Chloride 100 mmol/L      CO2 23.0 mmol/L      Calcium 9.8  mg/dL      Total Protein 7.6 g/dL      Albumin 4.6 g/dL      ALT (SGPT) 18 U/L      AST (SGOT) 19 U/L      Comment: Slight hemolysis detected by analyzer. Results may be affected.        Alkaline Phosphatase 86 U/L      Total Bilirubin 1.3 mg/dL      Globulin 3.0 gm/dL      A/G Ratio 1.5 g/dL      BUN/Creatinine Ratio 12.8     Anion Gap 12.0 mmol/L      eGFR 90.0 mL/min/1.73     Narrative:      GFR Normal >60  Chronic Kidney Disease <60  Kidney Failure <15      High Sensitivity Troponin T [[id number]]  (Normal) Collected: 04/17/23 1220    Specimen: Blood Updated: 04/17/23 1300     HS Troponin T 13 ng/L     Narrative:      High Sensitive Troponin T Reference Range:  <10.0 ng/L- Negative Female for AMI  <15.0 ng/L- Negative Male for AMI  >=10 - Abnormal Female indicating possible myocardial injury.  >=15 - Abnormal Male indicating possible myocardial injury.   Clinicians would have to utilize clinical acumen, EKG, Troponin, and serial changes to determine if it is an Acute Myocardial Infarction or myocardial injury due to an underlying chronic condition.         [place] Draw [[id number]] Collected: 04/17/23 1109    Specimen: Blood Updated: 04/17/23 1215    Narrative:      The following orders were created for panel order [place] Draw.  Procedure                               Abnormality         Status                     ---------                               -----------         ------                     Green Top (Gel)[[id number]]                                  Final result               Lavender Top[[id number]]                                     Final result               Red Top[[id number]]                                          Final result               Light Blue Top[[id number]]                                   Final result                 Please view results for these tests on the individual orders.    Green Top (Gel) [[id number]] Collected: 04/17/23 1109    Specimen: Blood Updated: 04/17/23 1215     Extra  "Tube Hold for add-ons.     Comment: Auto resulted.       Red Top [[id number]] Collected: 04/17/23 1109    Specimen: Blood Updated: 04/17/23 1215     Extra Tube Hold for add-ons.     Comment: Auto resulted.       Light Blue Top [[id number]] Collected: 04/17/23 1109    Specimen: Blood Updated: 04/17/23 1215     Extra Tube Hold for add-ons.     Comment: Auto resulted       Lavender Top [[id number]] Collected: 04/17/23 1109    Specimen: Blood Updated: 04/17/23 1215     Extra Tube hold for add-on     Comment: Auto resulted       BNP [[id number]]  (Abnormal) Collected: 04/17/23 1109    Specimen: Blood Updated: 04/17/23 1145     proBNP 1,249.0 pg/mL     Narrative:      Among patients with dyspnea, NT-proBNP is highly sensitive for the detection of acute congestive heart failure. In addition NT-proBNP of <300 pg/ml effectively rules out acute congestive heart failure with 99% negative predictive value.    Results may be falsely decreased if patient taking Biotin.      D-dimer, Quantitative [[id number]]  (Abnormal) Collected: 04/17/23 1109    Specimen: Blood Updated: 04/17/23 1145     D-Dimer, Quantitative 1.09 MCGFEU/mL     Narrative:      According to the assay 's published package insert, a normal (<0.50 MCGFEU/mL) D-dimer result in conjunction with a non-high clinical probability assessment, excludes deep vein thrombosis (DVT) and pulmonary embolism (PE) with high sensitivity.    D-dimer values increase with age and this can make VTE exclusion of an older population difficult. To address this, the [employer], based on best available evidence and recent guidelines, recommends that clinicians use age-adjusted D-dimer thresholds in patients greater than 50 years of age with: a) a low probability of PE who do not meet all Pulmonary Embolism Rule Out Criteria, or b) in those with intermediate probability of PE.   The formula for an age-adjusted D-dimer cut-off is \"age/100\".  For example, a 60 year " "old patient would have an age-adjusted cut-off of 0.60 MCGFEU/mL and an 80 year old 0.80 MCGFEU/mL.    CBC & Differential [[id number]]  (Abnormal) Collected: 04/17/23 1109    Specimen: Blood Updated: 04/17/23 1141    Narrative:      The following orders were created for panel order CBC & Differential.  Procedure                               Abnormality         Status                     ---------                               -----------         ------                     CBC Auto Differential[[id number]]        Abnormal            Final result                 Please view results for these tests on the individual orders.    CBC Auto Differential [[id number]]  (Abnormal) Collected: 04/17/23 1109    Specimen: Blood Updated: 04/17/23 1141     WBC 6.72 10*3/mm3      RBC 4.54 10*6/mm3      Hemoglobin 13.5 g/dL      Hematocrit 41.3 %      MCV 91.0 fL      MCH 29.7 pg      MCHC 32.7 g/dL      RDW 13.2 %      RDW-SD 43.6 fl      MPV 12.0 fL      Platelets 106 10*3/mm3      Neutrophil % 72.0 %      Lymphocyte % 16.2 %      Monocyte % 8.6 %      Eosinophil % 2.4 %      Basophil % 0.4 %      Neutrophils, Absolute 4.83 10*3/mm3      Lymphocytes, Absolute 1.09 10*3/mm3      Monocytes, Absolute 0.58 10*3/mm3      Eosinophils, Absolute 0.16 10*3/mm3      Basophils, Absolute 0.03 10*3/mm3           Assessment & Plan     1. Acute CHF: Historically this has been diastolic. Repeat echo this admission pending. Already improved significantly with 2 doses of IV lasix and now at his dry weight per his report but reports \"lungs still feel tight\"    -Continue with IV lasix 40 mg BID - next dose this am   -Continue Entresto 97/103 mg twice daily   -Continue Jardiance 10 mg daily   -Continue aldactone 50 mg daily - dose just increased Friday by Dr. Ramey  -Agree with  Outpatient [facility] placement     2. Chest pain: Appears to be more consistent with CHF and not ACS. Troponins are mildly elevated and flat trending. Pressure improved with " diuresis. Initially had inferior T wave inversions but resolved on repeat with T wave inversions in I and aVL (chronic).     3. CAD: stable, as noted above. Last cath 11/2022 no obstructive disease. Hx PCI and CABG.  Continue ASA, imdur, crestor 20, lopressor. He reports he no longer takes Ranexa but cannot recall why. LDL well controlled at 48.    4. DM 2: A1c 7.1; on oral agent and insulin   5. Mild aortic stenosis  6. Hypertension: somewhat elevated on arrival. Pt reports good control at home. Improved with diuresis.   7. Ascending aortic aneurysm - followed by     He is also being treated by admitting team for possible pneumonia     I discussed the patient's findings and my recommendations with patient and nursing staff.     Electronically signed by TEENA Rodriguez, 04/18/23, 10:23 AM CDT.

## 2023-04-18 NOTE — PAYOR COMM NOTE
"4/18/23 [facility]    PHONE [phone]    [phone]    ER ADMIT TO INPATIENT ON 4/17/23. INPATIENT ORDER.     INPATIENT  APPROVAL [id number]              Carlos A Goldstein Jr. (63 y.o. Male)     Date of Birth   1960    Social Security Number       Address   [address] [place] [address]    Home Phone   [phone]    MRN   [id number]       Mandaen   Yazidi    Marital Status                               Admission Date   4/17/23    Admission Type   Emergency    Admitting Provider   Ranjit Carter DO    Attending Provider   Ranjit Carter DO    Department, Room/Bed   [facility] 4B, 447/1       Discharge Date       Discharge Disposition       Discharge Destination                               Attending Provider: Ranjit Carter DO    Allergies: Flagyl [Metronidazole], Atorvastatin, Ciprofloxacin    Isolation: None   Infection: COVID (History) (04/29/21)   Code Status: CPR    Ht: 182.9 cm (72\")   Wt: 115 kg (253 lb 6.4 oz)    Admission Cmt: None   Principal Problem: Acute on chronic diastolic congestive heart failure [I50.33]                 Active Insurance as of 4/17/2023     Primary Coverage     Payor Plan Insurance Group Employer/Plan Group    [facility] DEPT [id number]      Payor Plan Address Payor Plan Phone Number Payor Plan Fax Number Effective Dates    [facility] OFFICE OF COMMUNITY CARE [phone]  2/23/2023 - None Entered    [address] [address]       [facility] [address]       Subscriber Name Subscriber Birth Date Member ID       CARLOS A GOLDSTEIN JR. 1960 [id number]           Secondary Coverage     Payor Plan Insurance Group Employer/Plan Group    [facility] CCN OPTUM      Payor Plan Address Payor Plan Phone Number Payor Plan Fax Number Effective Dates    [phone]  6/1/2020 - None Entered    [facility] [address]       Subscriber Name Subscriber Birth Date Member ID       TRSITONCARLOS A JR. 1960 " [id number]           Tertiary Coverage     Payor Plan Insurance Group Employer/Plan Group    HUMANA MEDICARE REPLACEMENT HUMANA MEDICARE REPLACEMENT [id number]     Payor Plan Address Payor Plan Phone Number Payor Plan Fax Number Effective Dates    [address] [address] [phone]  1/1/2019 - None Entered    [facility] [id number]       Subscriber Name Subscriber Birth Date Member ID       SHANNAN BOYER JR. 1960 [id number]                 Emergency Contacts      (Rel.) Home Phone Work Phone Mobile Phone    Daniela Boyer (Spouse) -- [phone]        Nj Guidry MD   Physician  Specialty:  Emergency Medicine  ED Provider Notes      Addendum  Date of Service:  04/17/23 1131  Creation Time:  04/17/23 1131     Expand AllCollapse All       Subjective      History of Present Illness  Patient is 63-year-old who came the ER complaint of chest pain shortness of breath and weight gain.     Chest Pain  Pain location:  Substernal area  Pain quality: aching and pressure    Pain radiates to:  Does not radiate  Pain severity:  Moderate  Onset quality:  Gradual  Timing:  Intermittent  Chronicity:  New  Context: at rest    Context: not breathing, not drug use, not eating, not intercourse and not lifting    Relieved by:  Nothing  Worsened by:  Exertion  Ineffective treatments:  None tried  Associated symptoms: orthopnea and shortness of breath    Associated symptoms: no abdominal pain, no anxiety, no back pain, no claudication, no cough, no dysphagia, no fatigue, no headache, no heartburn, no lower extremity edema, no nausea, no numbness, no vomiting and no weakness    Risk factors: coronary artery disease, high cholesterol, hypertension and male sex    Risk factors: no aortic disease, no Marfan's syndrome, no prior DVT/PE and no smoking    Shortness of Breath  Associated symptoms: chest pain    Associated symptoms: no abdominal pain, no claudication, no cough, no headaches, no neck pain and no vomiting           Review of Systems   Constitutional: Negative.  Negative for fatigue.   HENT: Negative.  Negative for trouble swallowing.    Respiratory: Positive for shortness of breath. Negative for cough.    Cardiovascular: Positive for chest pain and orthopnea. Negative for claudication.   Gastrointestinal: Negative.  Negative for abdominal distention, abdominal pain, heartburn, nausea and vomiting.   Endocrine: Negative.    Genitourinary: Negative.    Musculoskeletal: Negative.  Negative for back pain and neck pain.   Skin: Negative for color change and pallor.   Neurological: Negative.  Negative for syncope, weakness, light-headedness, numbness and headaches.   Hematological: Negative.  Does not bruise/bleed easily.   All other systems reviewed and are negative.        Medical History        Past Medical History:   Diagnosis Date   • Arthritis     • Asthma     • Cancer     • Carotid disease, bilateral     • Chest pain     • Chronic diastolic congestive heart failure 09/07/2019   • Chronic sinusitis     • Maribell bullosa     • Coronary artery disease involving native coronary artery of native heart with unstable angina pectoris 01/17/2017   • Deviated septum     • Diabetes mellitus     • Difficulty urinating     • Diverticulitis     • Enlarged prostate     • Fatty liver     • GERD (gastroesophageal reflux disease)     • [place] (hard of hearing)       Does have hearing aids   • Hyperlipidemia LDL goal <70 02/02/2017   • Hypertrophy of nasal turbinates     • Keratoderma     • Kidney stone     • Migraine     • Murmur, heart     • Myocardial infarction     • Obesity     • Paroxysmal atrial fibrillation 07/11/2019   • Personal history of COVID-19 07/2021   • PONV (postoperative nausea and vomiting)     • Primary hypertension 10/16/2016   • Psoriasis     • Seizures     • Sinus congestion     • Skin cancer     • Sleep apnea       not using cpap   • SOB (shortness of breath)     • Stroke     • UTI (urinary tract infection)                     Allergies   Allergen Reactions   • Flagyl [Metronidazole] Hives   • Atorvastatin Other (See Comments)        - LIPITOR -   Muscle cramps   • Ciprofloxacin Hives        - CIPRO -          Surgical History         Past Surgical History:   Procedure Laterality Date   • CARDIAC CATHETERIZATION   01/2016     Dr. Broadbent; widely patent previously placed stents in the left anterior descending and obstructive disease involving the diagonal branch which was treated medically   • CARDIAC CATHETERIZATION N/A 07/14/2017     Procedure: Left Heart Cath;  Surgeon: Wade Ramey MD;  Location:  [facility] CATH INVASIVE LOC[facility];  Service:    • CARDIAC CATHETERIZATION Left 10/15/2018     Procedure: Cardiac Catheterization/Vascular Study;  Surgeon: Wade Ramey MD;  Location:  [facility] CATH IN[facility];  Service: Cardiology   • CARDIAC CATHETERIZATION   10/15/2018     Procedure: Functional Flow [place];  Surgeon: Wade Ramey MD;  Location:  [facility]ATH INV[facility];  Service: Cardiology   • CARDIAC CATHETERIZATION N/A 10/15/2018     Procedure: Left ventriculography;  Surgeon: Wade Ramey MD;  Location:  [facility] CATH INV[facility];  Service: Cardiology   • CARDIAC CATHETERIZATION Left 06/26/2019     Procedure: Cardiac Catheterization/Vascular Study VEL [place]  HE WILL WAIT 1 YEAR FOR SHOULDER SURGERY ;  Surgeon: Wade Ramey MD;  Location:  [facility] C[facility] IN[facility];  Service: Cardiology   • CARDIAC CATHETERIZATION Left 04/30/2021     Procedure: Coronary angiography;  Surgeon: Sahil Llamas MD;  Location:  [facility] CATH INV[facility];  Service: Cardiology;  Laterality: Left;   • CARDIAC CATHETERIZATION N/A 04/30/2021     Procedure: Percutaneous Coronary Intervention;  Surgeon: Sahil Llamas MD;  Location:  [facility] C[facility] INV[facility];  Service: Cardiology;  Laterality: N/A;   • CARDIAC CATHETERIZATION N/A 11/09/2022     Procedure: Left Heart Cath with SVGs;  Surgeon: Wade Ramey MD;  Location:  [facility]  CATH INV[facility];  Service: Cardiology;  Laterality: N/A;   • CHOLECYSTECTOMY       • CHOLECYSTECTOMY WITH INTRAOPERATIVE CHOLANGIOGRAM N/A 08/01/2018     Procedure: CHOLECYSTECTOMY LAPAROSCOPIC INTRAOPERATIVE CHOLANGIOGRAM;  Surgeon: Shane Ann MD;  Location: [facility] OR;  Service: General   • COLONOSCOPY N/A 07/14/2020     Procedure: COLONOSCOPY WITH ANESTHESIA;  Surgeon: Anupam Morales DO;  Location: [facility] ENDOSCOPY;  Service: Gastroenterology;  Laterality: N/A;  pre: abdominal pain  post: diverticulosis  Vinayak Correia PA   • CORONARY ANGIOPLASTY       • CORONARY ARTERY BYPASS GRAFT N/A 07/06/2019     Procedure: CABG X2 WITH LIMA, LEFT LEG OVH, AND PLACEMENT OF LEFT FEMORAL ARTERIAL LINE;  Surgeon: Steven Tang MD;  Location: [facility] OR;  Service: Cardiothoracic   • CORONARY STENT PLACEMENT         x 6   • CYSTOSCOPY TRANSURETHRAL RESECTION OF PROSTATE N/A 1/23/2023     Procedure: CYSTOSCOPY TRANSURETHRAL RESECTION OF PROSTATE;  Surgeon: Latrell Pope MD;  Location: [facility] OR;  Service: Urology;  Laterality: N/A;   • ENDOSCOPIC FUNCTIONAL SINUS SURGERY (FESS) Bilateral 12/13/2017     Procedure: PROCEDURE PERFORMED:  Bilateral functional endoscopic anterior ethmoidectomy with bilateral middle meatal antrostomy Septoplasty Right kathia bullosa resection Bilateral inferior turbinate reduction via Coblation;  Surgeon: Mayank Ibarra MD;  Location: [facility] OR;  Service:    • ENDOSCOPY N/A 07/30/2018     Procedure: ESOPHAGOGASTRODUODENOSCOPY WITH ANESTHESIA;  Surgeon: Benitez Mas MD;  Location: [facility] ENDOSCOPY;  Service: Gastroenterology   • ENDOSCOPY N/A 07/14/2020     Procedure: ESOPHAGOGASTRODUODENOSCOPY WITH ANESTHESIA;  Surgeon: Anupam Morales DO;  Location: [facility] ENDOSCOPY;  Service: Gastroenterology;  Laterality: N/A;  pre: abdominal pain  post: esophagitis  Vinayak Correia PA   • HERNIA REPAIR         x2 inguinal area   • KIDNEY STONE SURGERY       • KNEE  ARTHROSCOPY Right 2022     Procedure: RIGHT KNEE PARTIAL LATERAL MENISCECTOMY;  Surgeon: Pedro Pablo Song MD;  Location:  [facility] OR;  Service: Orthopedics;  Laterality: Right;   • KNEE SURGERY Right     • OTHER SURGICAL HISTORY         urolift   • PROSTATE SURGERY         Dr. Badillo -    • ROTATOR CUFF REPAIR Right     • SLEEP ENDOSCOPY N/A 2023     Procedure: Videosleep endoscopy;  Surgeon: Mayank Ibarra MD;  Location:  [facility] OR;  Service: ENT;  Laterality: N/A;   • THUMB AMPUTATION Left       partial   • TOE AMPUTATION Right       big                  Family History   Problem Relation Age of Onset   • Heart disease Father     • COPD Mother     • Hypertension Mother     • Asthma Mother     • No Known Problems Sister     • Colon cancer Paternal Uncle     • Prostate cancer Maternal Grandfather     • No Known Problems Sister     • Colon cancer Maternal Grandmother     • Colon polyps Maternal Grandmother           Social History   Social History            Socioeconomic History   • Marital status:    Tobacco Use   • Smoking status: Former       Packs/day: 1.50       Years: 18.00       Pack years: 27.00       Types: Cigarettes, Cigars       Start date:        Quit date:        Years since quittin.3   • Smokeless tobacco: Former       Types: Chew       Quit date:    Vaping Use   • Vaping Use: Never used   Substance and Sexual Activity   • Alcohol use: No       Comment: 0   • Drug use: No   • Sexual activity: Defer                        Objective      Physical Exam  Vitals and nursing note reviewed. Exam conducted with a chaperone present.   Constitutional:       General: He is not in acute distress.     Appearance: Normal appearance. He is well-developed. He is not toxic-appearing.   HENT:      Head: Normocephalic and atraumatic.      Nose: Nose normal.      Mouth/Throat:      Mouth: Mucous membranes are moist.      Pharynx: Uvula midline.   Eyes:      General: Lids are  normal. Lids are everted, no foreign bodies appreciated.      Conjunctiva/sclera: Conjunctivae normal.      Pupils: Pupils are equal, round, and reactive to light.   Neck:      Vascular: Normal carotid pulses. No carotid bruit or JVD.      Trachea: Trachea and phonation normal. No tracheal deviation.   Cardiovascular:      Rate and Rhythm: Normal rate and regular rhythm.      Chest Wall: PMI is not displaced.      Pulses: Normal pulses.      Heart sounds: Normal heart sounds.     No gallop.   Pulmonary:      Effort: Pulmonary effort is normal. No tachypnea, accessory muscle usage or respiratory distress.      Breath sounds: No stridor. Examination of the right-lower field reveals rales. Examination of the left-lower field reveals rales. Rales present. No decreased breath sounds, wheezing or rhonchi.   Abdominal:      General: Bowel sounds are normal. There is no distension.      Palpations: Abdomen is soft.      Tenderness: There is no abdominal tenderness.   Musculoskeletal:         General: No swelling. Normal range of motion.      Cervical back: Full passive range of motion without pain, normal range of motion and neck supple. No rigidity.      Right lower leg: Edema present.      Left lower leg: Edema present.      Comments: Lower extremity exam bilaterally is unremarkable.  There is no right or left calf tenderness .  There is no palpable venous cord.  No obvious difference in the size of the legs.  No pitting edema.  The dorsalis pedis and posterior tibial femoral and popliteal pulses are palpable and +2 bilaterally.  Homans sign is negative   Skin:     General: Skin is warm and dry.      Capillary Refill: Capillary refill takes less than 2 seconds.      Coloration: Skin is not jaundiced or pale.      Nails: There is no clubbing.   Neurological:      General: No focal deficit present.      Mental Status: He is alert and oriented to person, place, and time.      GCS: GCS eye subscore is 4. GCS verbal subscore  is 5. GCS motor subscore is 6.      Cranial Nerves: No cranial nerve deficit.      Motor: Motor function is intact.      Gait: Gait normal.      Deep Tendon Reflexes: Reflexes are normal and symmetric. Reflexes normal.   Psychiatric:         Speech: Speech normal.         Behavior: Behavior normal.            Procedures                 ED Course      ED Course as of 04/17/23 1510   Mon Apr 17, 2023   1120 Sinus st t changes [TS]   1427 . Mild bronchial wall thickening and small nodular infiltrate in the  right lower lobe without consolidation likely represents a mild                      ED Course      ED Course as of 04/17/23 1510   Mon Apr 17, 2023   1120 Sinus st t changes [TS]   1427 . Mild bronchial wall thickening and small nodular infiltrate in the  right lower lobe without consolidation likely represents a mild degree  of bronchopneumonia. There is an associated 4 x 10 mm nodule in the  right lower lobe which has increased since this compared with the most  recent CT when it measured 3 mm. This is likely infectious or  inflammatory, repeat chest CT is recommended in 3 months however to  ensure resolution.  2. Mild aneurysmal dilation of the ascending aorta measuring 4.5 x 4.2  cm, compared with 4.2 x 3.9 cm on the previous CT, without evidence of  dissection or aneurysm rupture.  3. Dilation of the central pulmonary arteries compatible pulmonary  arterial hypertension.  4. Extensive coronary artery disease as before. There is previous median  sternotomy.  5. Trace left pleural effusion and a small right pleural effusion is  present.  These findings in the region of followed by me discussed with the patient [TS]   1504 Case were discussed with the patient and his pain is relieved with the nitroglycerin Lasix to help with shortness of breath he also has some possibly early pneumonia blood cultures and Rocephin have been given the patient.  He has been informed of pulmonary nodule the aneurysm of the ascending  aorta is present without rupture. [TS]       ED Course User Index  [TS] Nj Guidry MD Thompson, Terri D, APRN   Nurse Practitioner  Hospitalist  H&P      Attested  Date of Service:  04/17/23 1515  Creation Time:  04/17/23 1515     Attested            Attestation signed by Ranjit Carter DO at 04/17/23 1907     This visit was performed by both a physician and an APC. I personally evaluated and examined the patient. I performed all aspects of the MDM as documented.     Patient presenting with chest pain and shortness of breath.  Imaging and overall presentation seems to be setting of potential acute on chronic heart failure on exam/review.  His initial high-sensitivity troponin was negative and second 1 had a slight bump but has already normalized again.  Initial EKG had T wave inversions in his inferior leads.  This had resolved on a subsequent EKG but in that second EKG had T wave inversions in lateral leads to include 1 and aVL.  At this time he is on nitro drip and his chest pain has resolved.  Has been given a dose diuretic.  Note he had a cardiac cath back in September with a 50% occlusion of one of his grafts.  Given his overall symptoms and combination of events we will ask cards to evaluate and we will follow-up with recommendations.     Electronically signed by Ranjit Carter DO, 4/17/2023, 19:04 CDT.                          Date of Admission: 4/17/2023  Primary Care Physician: Vinayak Correia PA     Subjective   Primary Historian: Patient and wife     Chief Complaint: Shortness of breathing and chest pressure     History of Present Illness  Carlos A Boyer Jr. is a 63-year-old male with a past medical history to include coronary artery disease with CABG, diastolic heart failure, insulin-dependent diabetes-suspect insulin resistance, paroxysmal atrial fibrillation on Eliquis, please see below for complete list.  Patient seen by Dr. Ramey, cardiology 4/14/2023 with plans for echo with  possible CardioMEMS placement 5/3/2023.  Patient has instructions regarding holding Eliquis and metformin.  Cardiology consulted however Dr. Ramey is not on-call.  Call placed to him to update him on patient's admission for acute on chronic heart failure.  Abnormal CT angiogram reveals possible mild right lower lobe consolidation with concerns for pneumonia however patient has had no cough, fever, recent illness.  He has known AAA with increasing size, discussing with Dr. Rice re: f/u plans.  Patient presented [facility] emergency department with complaints of shortness of breathing has been occurring for the past several days however he developed sudden severe chest pressure earlier today.  He had associated nausea, no diaphoresis.  He has been placed on Tridil with relief of symptoms.  He is on room air.  Troponin initially negative now slightly positive with EKG changes.  Elevated B NP also noted.  He is admitted for further evaluation treatment.        Review of Systems   Otherwise complete ROS reviewed and negative except as mentioned in the HPI.     Past Medical History:   Medical History        Past Medical History:   Diagnosis Date   • Arthritis     • Asthma     • Cancer     • Carotid disease, bilateral     • Chest pain     • Chronic diastolic congestive heart failure 09/07/2019   • Chronic sinusitis     • Maribell bullosa     • Coronary artery disease involving native coronary artery of native heart with unstable angina pectoris 01/17/2017   • Deviated septum     • Diabetes mellitus     • Difficulty urinating     • Diverticulitis     • Enlarged prostate     • Fatty liver     • GERD (gastroesophageal reflux disease)     • [place] (hard of hearing)       Does have hearing aids   • Hyperlipidemia LDL goal <70 02/02/2017   • Hypertrophy of nasal turbinates     • Keratoderma     • Kidney stone     • Migraine     • Murmur, heart     • Myocardial infarction     • Obesity     • Paroxysmal atrial fibrillation  07/11/2019   • Personal history of COVID-19 07/2021   • PONV (postoperative nausea and vomiting)     • Primary hypertension 10/16/2016   • Psoriasis     • Seizures     • Sinus congestion     • Skin cancer     • Sleep apnea       not using cpap   • SOB (shortness of breath)     • Stroke     • UTI (urinary tract infection)           Past Surgical History:  Surgical History         Past Surgical History:   Procedure Laterality Date   • CARDIAC CATHETERIZATION   01/2016     Dr. Broadbent; widely patent previously placed stents in the left anterior descending and obstructive disease involving the diagonal branch which was treated medically   • CARDIAC CATHETERIZATION N/A 07/14/2017     Procedure: Left Heart Cath;  Surgeon: Wade Ramey MD;  Location:  [facility] CATH INVASIVE [facility];  Service:    • CARDIAC CATHETERIZATION Left 10/15/2018     Procedure: Cardiac Catheterization/Vascular Study;  Surgeon: Wade Ramey MD;  Location:  [facility] CATH INVAS[facility];  Service: Cardiology   • CARDIAC CATHETERIZATION   10/15/2018     Procedure: Functional Flow [place];  Surgeon: Wade Ramey MD;  Location:  [facility] CATH INVAS[facility];  Service: Cardiology   • CARDIAC CATHETERIZATION N/A 10/15/2018     Procedure: Left ventriculography;  Surgeon: Wade Ramey MD;  Location:  [facility] CATH INVASIVE [facility];  Service: Cardiology   • CARDIAC CATHETERIZATION Left 06/26/2019     Procedure: Cardiac Catheterization/Vascular Study [place]  HE WILL WAIT 1 YEAR FOR SHOULDER SURGERY ;  Surgeon: Wade Ramey MD;  Location:  [facility] CATH INVAS[facility];  Service: Cardiology   • CARDIAC CATHETERIZATION Left 04/30/2021     Procedure: Coronary angiography;  Surgeon: Sahil Llamas MD;  Location:  [facility] CATH INVAS[facility];  Service: Cardiology;  Laterality: Left;   • CARDIAC CATHETERIZATION N/A 04/30/2021     Procedure: Percutaneous Coronary Intervention;  Surgeon: Sahil Llamas MD;  Location:  [facility] CATH INVASIVE [facility];  Service:  Cardiology;  Laterality: N/A;   • CARDIAC CATHETERIZATION N/A 11/09/2022     Procedure: Left Heart Cath with SVGs;  Surgeon: Wade Ramey MD;  Location: [facility] CATH INVASIVE LOCATION;  Service: Cardiology;  Laterality: N/A;   • CHOLECYSTECTOMY       • CHOLECYSTECTOMY WITH INTRAOPERATIVE CHOLANGIOGRAM N/A 08/01/2018     Procedure: CHOLECYSTECTOMY LAPAROSCOPIC INTRAOPERATIVE CHOLANGIOGRAM;  Surgeon: Shane Ann MD;  Location: [facility] OR;  Service: General   • COLONOSCOPY N/A 07/14/2020     Procedure: COLONOSCOPY WITH ANESTHESIA;  Surgeon: Anupam Morales DO;  Location: [facility] ENDOSCOPY;  Service: Gastroenterology;  Laterality: N/A;  pre: abdominal pain  post: diverticulosis  Vinayak Correia PA   • CORONARY ANGIOPLASTY       • CORONARY ARTERY BYPASS GRAFT N/A 07/06/2019     Procedure: CABG X2 WITH LIMA, LEFT LEG OVH, AND PLACEMENT OF LEFT FEMORAL ARTERIAL LINE;  Surgeon: Steven Tang MD;  Location: [facility] OR;  Service: Cardiothoracic   • CORONARY STENT PLACEMENT         x 6   • CYSTOSCOPY TRANSURETHRAL RESECTION OF PROSTATE N/A 1/23/2023     Procedure: CYSTOSCOPY TRANSURETHRAL RESECTION OF PROSTATE;  Surgeon: Latrell Pope MD;  Location: [facility] OR;  Service: Urology;  Laterality: N/A;   • ENDOSCOPIC FUNCTIONAL SINUS SURGERY (FESS) Bilateral 12/13/2017     Procedure: PROCEDURE PERFORMED:  Bilateral functional endoscopic anterior ethmoidectomy with bilateral middle meatal antrostomy Septoplasty Right kathia bullosa resection Bilateral inferior turbinate reduction via Coblation;  Surgeon: Mayank Ibarra MD;  Location: [facility] OR;  Service:    • ENDOSCOPY N/A 07/30/2018     Procedure: ESOPHAGOGASTRODUODENOSCOPY WITH ANESTHESIA;  Surgeon: Benitez Mas MD;  Location: [facility] ENDOSCOPY;  Service: Gastroenterology   • ENDOSCOPY N/A 07/14/2020     Procedure: ESOPHAGOGASTRODUODENOSCOPY WITH ANESTHESIA;  Surgeon: Anupam Morales DO;  Location: [facility] ENDOSCOPY;  Service: Gastroenterology;   "Laterality: N/A;  pre: abdominal pain  post: esophagitis  Vinayak Correia PA   • HERNIA REPAIR         x2 inguinal area   • KIDNEY STONE SURGERY       • KNEE ARTHROSCOPY Right 03/01/2022     Procedure: RIGHT KNEE PARTIAL LATERAL MENISCECTOMY;  Surgeon: Pedro Pablo Song MD;  Location:  [facility];  Service: Orthopedics;  Laterality: Right;   • KNEE SURGERY Right     • OTHER SURGICAL HISTORY         urolift   • PROSTATE SURGERY         Dr. Badillo - 2017   • ROTATOR CUFF REPAIR Right     • SLEEP ENDOSCOPY N/A 2/27/2023     Procedure: Videosleep endoscopy;  Surgeon: Mayank Ibarra MD;  Location:  [facility];  Service: ENT;  Laterality: N/A;   • THUMB AMPUTATION Left       partial   • TOE AMPUTATION Right       big         Social History:  reports that he quit smoking about 30 years ago. His smoking use included cigarettes and cigars. He started smoking about 48 years ago. He has a 27.00 pack-year smoking history. He quit smokeless tobacco use about 14 years ago.  His smokeless tobacco use included chew. He reports that he does not drink alcohol and does not use drugs.     Family History: family history includes Asthma in his mother; COPD in his mother; Colon cancer in his maternal grandmother and paternal uncle; Colon polyps in his maternal grandmother        Vital Signs: /99 (BP Location: Right arm, Patient Position: Sitting)   Pulse 65   Temp 97.9 °F (36.6 °C) (Oral)   Resp 18   Ht 182.9 cm (72\")   Wt 118 kg (260 lb)   SpO2 95%   BMI 35.26 kg/m²   Physical Exam  Vitals reviewed.   Constitutional:       Appearance: Normal appearance. He is obese.   HENT:      Head: Normocephalic and atraumatic.      Mouth/Throat:      Mouth: Mucous membranes are moist.      Pharynx: Oropharynx is clear.   Eyes:      Extraocular Movements: Extraocular movements intact.      Conjunctiva/sclera: Conjunctivae normal.   Cardiovascular:      Rate and Rhythm: Normal rate and regular rhythm.      Heart sounds: Murmur " heard.   Pulmonary:      Effort: Pulmonary effort is normal.      Breath sounds: Normal breath sounds.   Abdominal:      Palpations: Abdomen is soft.      Comments: Protuberant   Musculoskeletal:         General: Normal range of motion.      Cervical back: Normal range of motion and neck supple.      Right lower leg: No edema.      Left lower leg: No edema.   Skin:     General: Skin is warm and dry.   Neurological:      General: No focal deficit present.      Mental Status: He is alert and oriented to person, place, and time.   Psychiatric:         Mood and Affect: Mood normal.         Behavior: Behavior normal.        last 24 hours)      Procedure Component Value Units Date/Time     High Sensitivity Troponin T 2Hr [[id number]] Collected: 04/17/23 1446     Specimen: Blood Updated: 04/17/23 1452     Comprehensive Metabolic Panel [[id number]]  (Abnormal) Collected: 04/17/23 1220     Specimen: Blood Updated: 04/17/23 1307       Glucose 230 mg/dL         BUN 12 mg/dL         Creatinine 0.94 mg/dL         Sodium 135 mmol/L         Potassium 4.3 mmol/L         Comment: Slight hemolysis detected by analyzer. Results may be affected.          Chloride 100 mmol/L         CO2 23.0 mmol/L         Calcium 9.8 mg/dL         Total Protein 7.6 g/dL         Albumin 4.6 g/dL         ALT (SGPT) 18 U/L         AST (SGOT) 19 U/L         Comment: Slight hemolysis detected by analyzer. Results may be affected.          Alkaline Phosphatase 86 U/L         Total Bilirubin 1.3 mg/dL         Globulin 3.0 gm/dL         A/G Ratio 1.5 g/dL         BUN/Creatinine Ratio 12.8       Anion Gap 12.0 mmol/L         eGFR 90.0 mL/min/1.73       High Sensitivity Troponin T [[id number]]  (Normal) Collected: 04/17/23 1220     Specimen: Blood Updated: 04/17/23 1300       HS Troponin T 13 ng/L       BNP [[id number]]  (Abnormal) Collected: 04/17/23 1109     Specimen: Blood Updated: 04/17/23 1145       proBNP 1,249.0 pg/mL       D-dimer, Quantitative [[id number]]   (Abnormal) Collected: 04/17/23 1109     Specimen: Blood Updated: 04/17/23 1145       D-Dimer, Quantitative 1.09 MCGFEU/mL       CBC Auto Differential [[id number]]  (Abnormal) Collected: 04/17/23 1109     Specimen: Blood Updated: 04/17/23 1141       WBC 6.72 10*3/mm3         RBC 4.54 10*6/mm3         Hemoglobin 13.5 g/dL         Hematocrit 41.3 %         MCV 91.0 fL         MCH 29.7 pg         MCHC 32.7 g/dL         RDW 13.2 %         RDW-SD 43.6 fl         MPV 12.0 fL         Platelets 106 10*3/mm3         Neutrophil % 72.0 %         Lymphocyte % 16.2 %         Monocyte % 8.6 %         Eosinophil % 2.4 %         Basophil % 0.4 %         Neutrophils, Absolute 4.83 10*3/mm3         Lymphocytes, Absolute 1.09 10*3/mm3         Monocytes, Absolute 0.58 10*3/mm3         Eosinophils, Absolute 0.16 10*3/mm3         Basophils, Absolute 0.03 10*3/mm3                    Imaging Results (Last 24 Hours)      Procedure Component Value Units Date/Time     CT Angiogram Chest [[id number]] Collected: 04/17/23 1337       Updated: 04/17/23 1404     Narrative:       EXAMINATION: CT ANGIOGRAM CHEST- 4/17/2023 1:37 PM CDT     HISTORY: Acute aortic syndrome (AAS) suspected. Chest pain and shortness  of breath.     DOSE: 439 mGycm (Automatic exposure control technique was implemented in  an effort to keep the radiation dose as low as possible without  compromising image quality)     REPORT:  Spiral CT of the chest was performed after administration of intravenous  contrast from the thoracic inlet through the upper abdomen using CTA  protocol, which includes reconstructed maximum intensity projection  (MIP)coronal and sagittal images.     Comparison: CTA chest 02/23/2023.     The contrast bolus is satisfactory, there is mild respiratory cardiac  motion artifact. There is mild dilation of the central pulmonary  arteries as before, pulmonary arterial hypertension is likely. The main  pulmonary artery segment measures 4.1 cm in diameter. No  filling defects  are seen in the pulmonary arteries. Heart size appears be normal and the  RV LV ratio is less than 1, normal. In cross-section, the ascending  aorta has a slightly ovoid shape, currently measuring approximately 4.5  x 4.2 cm in diameter, compared with 4.2 x 3.9 cm at the same level on  the previous scan. At the arch level, the aorta has a maximum diameter  3.1 cm, which is normal. The descending thoracic aorta has a maximum  diameter 2.9 cm which is normal. No aortic dissection or aneurysm  rupture is identified. There is previous CABG. Heavy calcification seen  within the coronary arteries, greatest at the LAD distribution and there  appear to be stents within the LAD. There is trace left pleural effusion  and a small right pleural effusion is present. Review of lung windows  demonstrates no evidence of consolidating pneumonia. There is mild  bronchial wall thickening of the lung zones, greater on the right. There  is subtle haziness at the bronchial branch points in the right lower  lobe, including a focal nodular infiltrates seen on images 90 through 93  of series 5.. There is a benign-appearing 4 mm subpleural nodule in the  lingula image 19 series 5. In the right lower lobe, there is a  noncalcified perifissural nodule that measures 4 x 10 mm, where before  there was only a 3 mm nodule in this location. This is compared to the  infectious or inflammatory but follow-up chest CT is recommended in 3  months. There is a subpleural nodule in the right lower lobe laterally  which is unchanged, image 113 series 5. This measures 5 mm. There is  mild subpleural scarring in the lateral aspect of the right middle lobe.  No pneumothorax is identified. The visualized upper abdomen is  unremarkable except for evidence previous cholecystectomy. Review of  bone windows shows no acute abnormality.        Impression:       1. Mild bronchial wall thickening and small nodular infiltrate in the  right lower lobe  without consolidation likely represents a mild degree  of bronchopneumonia. There is an associated 4 x 10 mm nodule in the  right lower lobe which has increased since this compared with the most  recent CT when it measured 3 mm. This is likely infectious or  inflammatory, repeat chest CT is recommended in 3 months however to  ensure resolution.  2. Mild aneurysmal dilation of the ascending aorta measuring 4.5 x 4.2  cm, compared with 4.2 x 3.9 cm on the previous CT, without evidence of  dissection or aneurysm rupture.  3. Dilation of the central pulmonary arteries compatible pulmonary  arterial hypertension.  4. Extensive coronary artery disease as before. There is previous median  sternotomy.  5. Trace left pleural effusion and a small right pleural effusion is  present.           This report was finalized on 04/17/2023 13:53 by Dr. Cyril Reyna MD.     XR Chest 1 View [[id number]] Collected: 04/17/23 1109       Updated: 04/17/23 1114     Narrative:       EXAMINATION: XR CHEST 1 VW- 4/17/2023 11:09 AM CDT     HISTORY: Chest Pain Triage Protocol     REPORT: A frontal view of the chest was obtained.     COMPARISON: Chest x-ray 10/20/2022.     There is volume loss in the lung bases, increased central and basilar  vascular congestion is noted in the heart is mildly enlarged. No focal  pulmonary infiltrate or consolidation is identified. There is previous  CABG. No pneumothorax or pleural effusion is seen. The osseous  structures and upper abdomen are unremarkable.        Impression:       Mild cardiomegaly and mild vascular congestion centrally  probably related to mild CHF.  This report was finalized on 04/17/2023 11:11 by Dr. Cyril Reyna MD.          11/22/2022  Conclusion of cardiac catheterization    11/9/2022        Coronary angiography:  Mild stenosis of left main coronary artery without obstructive disease  Left anterior descending coronary artery has multiple stents in the proximal and midportion after  which the vessel is occluded  No obstructive disease of diagonal branch  No significant disease of main trunk of left circumflex coronary artery  First obtuse marginal branch has 50% stenosis with patent bypass graft  Second and third obtuse marginal branch does not have any obstructive disease  Patent saphenous venous graft to the obtuse marginal branch  Patent left internal mammary artery graft to the left anterior descending coronary artery  Right coronary artery has mild atherosclerotic changes in the proximal and midportion without obstructive disease.     Plan:Overall no obstructive coronary artery disease  Optimal guideline directed medical therapy     9/21/2022 Stress test with myocardial perfusion  Interpretation Summary     • Diaphragmatic attenuation artifact is present.  • Left ventricular ejection fraction is normal. (Calculated EF = 68%).  • Moderate apical and apical lateral ischemia     Results for orders placed during the hospital encounter of 04/28/21     Adult Transthoracic Echo Complete W/ Cont if Necessary Per Protocol     Interpretation Summary  · Left ventricular ejection fraction appears to be 56 - 60%. Left ventricular systolic function is normal. There appears to be mild hypokinesis of the apex and distal septal and anterior walls.  · Left ventricular wall thickness is consistent with mild to moderate concentric hypertrophy.  · Left ventricular diastolic function is consistent with    (grade II w/high LAP) pseudonormalization.  · Mild aortic valve stenosis is present.  · Normal size and function of the right ventricle.    Active Hospital Problems     Diagnosis     • **Acute on chronic diastolic congestive heart failure     • Unstable angina pectoris     • Aneurysm of ascending aorta without rupture     • Chronic anticoagulation     • Bilateral pleural effusion     • Paroxysmal atrial fibrillation (HCC)     • Class 2 severe obesity due to excess calories with serious comorbidity and body  mass index (BMI) of 35.0 to 35.9 in adult ([facility])     • Coronary artery disease of native artery of native heart with stable angina pectoris     • Type 2 diabetes mellitus with circulatory disorder, with long-term current use of insulin ([facility])     • Seizure disorder           Treatment Plan  1.  The patient will be admitted to Dr. Carter service here at [facility].   2.  Follow heart failure pathway  3.  Consult cardiology  4.  Serial troponins, EKGs as needed  5.  Lasix 40 mg IV x2, received 80 mg in the emergency department  6.  DVT prophylaxis with ongoing Eliquis, patient received aspirin 324 mg in the emergency department  7.  Home medications reviewed and restarted as appropriate  8.  Patient received Rocephin 1 g IV in the emergency department for abnormal CT of the chest however will hold further antibiotics for now.  Patient quite reluctant due to history of C. difficile in the past  9.  Monitor glucose before meals and at bedtime with regular insulin sliding scale coverage, patient insistent on taking Levemir 110 units every 12 hours  10.  Taper and DC Tridil as tolerated  11.  Labs in a.m.     Medical Decision Making  Number and Complexity of problems: 10  Differential Diagnosis: None     Conditions and Status        Condition is unchanged.     [facility] Data  External documents reviewed: No  Cardiac tracing (EKG, telemetry) interpretation: Reviewed  Radiology interpretation: Reviewed  Labs reviewed: Yes  Any tests that were considered but not ordered: Echocardiogram, await cardiology's recommendation     Decision rules/scores evaluated (example [id number], Wells, etc): No     Discussed with: Patient, wife, Dr. Carter     Care Planning  Shared decision making: Patient, wife, Dr. Carter  Code status and discussions: Full         Care Planning  Shared decision making: Patient, wife, Dr. Raul Lambert status and discussions: Full     Disposition  Social Determinants of Health that impact treatment or  disposition: None  Estimated length of stay is 2+ days.      I confirmed that the patient's advanced care plan is present, code status is documented, and a surrogate decision maker is listed in the patient's medical record.      The patient's surrogate decision maker is wife.      The patient was seen and examined by me on 4/17/2023 at 3:15 PM.     Electronically signed by TEENA Leung, 04/17/23, 18:51 CDT.                           Cosigned by: Ranjit Carter DO at 04/17/23 1907         17/23 1201 -- 72 -- 146/83 -- -- 88 Abnormal    04/17/23 1146 -- 71 -- 154/88 -- -- 90   04                    Current Facility-Administered Medications   Medication Dose Route Frequency Provider Last Rate Last Admin   • acetaminophen (TYLENOL) tablet 650 mg  650 mg Oral Q4H PRN Ashtny Beckman APRN        Or   • acetaminophen (TYLENOL) 160 MG/5ML solution 650 mg  650 mg Oral Q4H PRN Ashtyn Beckman APRN        Or   • acetaminophen (TYLENOL) suppository 650 mg  650 mg Rectal Q4H PRN Ashtyn Beckman APRN       • apixaban (ELIQUIS) tablet 5 mg  5 mg Oral Q12H Ashtyn Beckman APRN   5 mg at 04/17/23 2103   • aspirin EC tablet 81 mg  81 mg Oral Daily Ashtyn Beckman APRN       • dextrose (D50W) (25 g/50 mL) IV injection 25 g  25 g Intravenous Q15 Min PRN Ashtyn Beckman APRN       • dextrose (GLUTOSE) oral gel 15 g  15 g Oral Q15 Min PRN Ashtyn Beckman APRN       • docusate sodium (COLACE) capsule 100 mg  100 mg Oral BID Ashtyn Beckman APRN       • empagliflozin (JARDIANCE) tablet 10 mg  10 mg Oral Daily Ashtyn Beckman APRN       • fluticasone (FLONASE) 50 MCG/ACT nasal spray 2 spray  2 spray Nasal Daily PRN Ashtyn Beckman APRN       • furosemide (LASIX) injection 40 mg  40 mg Intravenous BID Ashtyn Beckman APRN   40 mg at 04/17/23 1836   • gabapentin (NEURONTIN) capsule 300 mg  300 mg Oral Nightly Ashtyn Beckman APRN   300 mg at 04/17/23 2104   • glucagon (human recombinant)  (GLUCAGEN DIAGNOSTIC) injection 1 mg  1 mg Intramuscular Q15 Min PRN Ashtyn Beckman, APRN       • guaiFENesin (MUCINEX) 12 hr tablet 600 mg  600 mg Oral Q12H Ashtyn Beckman APRN   600 mg at 04/17/23 2103   • insulin detemir (LEVEMIR) injection 110 Units  110 Units Subcutaneous Q12H Ashtyn Beckman, APRN   55 Units at 04/17/23 2105   • Insulin Lispro (humaLOG) injection 0-24 Units  0-24 Units Subcutaneous 4x Daily With Meals & Nightly Ashtyn Beckman, APRN   8 Units at 04/17/23 2120   • isosorbide mononitrate (IMDUR) 24 hr tablet 120 mg  120 mg Oral Daily Ashtyn Beckman, APRN       • lamoTRIgine (LaMICtal) tablet 200 mg  200 mg Oral BID Ashtyn Beckman APRN   200 mg at 04/17/23 2102   • levETIRAcetam (KEPPRA) tablet 1,500 mg  1,500 mg Oral QAM Ashtyn Beckman, APRN       • levETIRAcetam (KEPPRA) tablet 2,000 mg  2,000 mg Oral Q PM Ranjit Carter DO   2,000 mg at 04/17/23 2102   • metoprolol tartrate (LOPRESSOR) tablet 100 mg  100 mg Oral BID Ashtyn Beckman, APRN   100 mg at 04/17/23 2102   • nitroglycerin (NITROSTAT) SL tablet 0.4 mg  0.4 mg Sublingual Q5 Min PRN Ashtyn Beckman, APRN       • nitroglycerin (TRIDIL) 200 mcg/ml infusion  5-200 mcg/min Intravenous Titrated Nj Guidry MD   Stopped at 04/18/23 0252   • pantoprazole (PROTONIX) EC tablet 40 mg  40 mg Oral BID AC Ashtyn Beckman APRN   40 mg at 04/17/23 1836   • rosuvastatin (CRESTOR) tablet 20 mg  20 mg Oral Nightly Ashtyn Beckman APRN   20 mg at 04/17/23 2103   • saccharomyces boulardii (FLORASTOR) capsule 250 mg  250 mg Oral BID Ashtyn Beckman APRN   250 mg at 04/17/23 2103   • sacubitril-valsartan (ENTRESTO) 49-51 MG tablet 1 tablet  1 tablet Oral Q12H Ashtyn Beckman, APRN   1 tablet at 04/17/23 2104   • sennosides-docusate (PERICOLACE) 8.6-50 MG per tablet 1 tablet  1 tablet Oral Nightly PRN Ashtyn Beckman, APRN       • sodium chloride 0.9 % flush 10 mL  10 mL Intravenous PRN Nj Guidry MD       •  sodium chloride 0.9 % flush 10 mL  10 mL Intravenous Q12H Ashtyn Beckman APRN   10 mL at 04/17/23 2104   • sodium chloride 0.9 % flush 10 mL  10 mL Intravenous PRN Ashtyn Beckman APRN       • sodium chloride 0.9 % infusion 40 mL  40 mL Intravenous PRN Ashtyn Beckman APRN       • spironolactone (ALDACTONE) tablet 50 mg  50 mg Oral BID Ashtyn Beckman APRN   50 mg at 04/17/23 2103     ECG/EMG Results (last 24 hours)     Procedure Component Value Units Date/Time    ECG 12 Lead ED Triage Standing Order; Chest Pain [[id number]] Collected: 04/17/23 1048     Updated: 04/17/23 1048     QT Interval 420 ms      QTC Interval 456 ms     Narrative:      Test Reason : ED Triage Standing Order~  Blood Pressure :   */*   mmHG  Vent. Rate :  71 BPM     Atrial Rate :  71 BPM     P-R Int : 212 ms          QRS Dur :  94 ms      QT Int : 420 ms       P-R-T Axes :  76  78 -47 degrees     QTc Int : 456 ms    Sinus rhythm with 1st degree AV block  Possible Anterior infarct , age undetermined  ST & T wave abnormality, consider inferior ischemia  Abnormal ECG  When compared with ECG of 23-FEB-2023 10:41,  T wave inversion now evident in Inferior leads  T wave inversion no longer evident in Lateral leads    Referred By: ELIZABETH           Confirmed By:     ECG 12 Lead ED Triage Standing Order; Chest Pain [[id number]] Collected: 04/17/23 1438     Updated: 04/17/23 1502     QT Interval 426 ms      QTC Interval 450 ms     Narrative:      Test Reason : ED Triage Standing Order~  Blood Pressure :   */*   mmHG  Vent. Rate :  67 BPM     Atrial Rate :  67 BPM     P-R Int : 202 ms          QRS Dur : 100 ms      QT Int : 426 ms       P-R-T Axes : -14 -25 108 degrees     QTc Int : 450 ms    Normal sinus rhythm  Anterior infarct (cited on or before 17-APR-2023)  ST & T wave abnormality, consider lateral ischemia  Abnormal ECG  When compared with ECG of 17-APR-2023 10:48,  QRS axis Shifted left  Serial changes of Anterior infarct  Present    Referred By: ELIZABETH           Confirmed By:

## 2023-04-18 NOTE — PLAN OF CARE
Goal Outcome Evaluation:  Plan of Care Reviewed With: patient        Progress: improving  Outcome Evaluation: VSS. Sinus 61-73 on tele.  No more c/o chest pain.  Nitro gtt titrated down and stopped.  Excellent urine output.  Patient rested well overnight. Safety maintained. Remains on room air maintaining sat >90%.  Self-restricting oral fluid intake.

## 2023-04-18 NOTE — CASE MANAGEMENT/SOCIAL WORK
Discharge Planning Assessment  [facility]     Patient Name: Carlos A Boyer Jr.  MRN: [id number]  Today's Date: 4/18/2023    Admit Date: 4/17/2023    Plan: Unable to screen patient for dc planning at this time. Patient out of room for testing.   Discharge Needs Assessment    No documentation.                Discharge Plan     Row Name 04/18/23 1349       Plan    Plan Unable to screen patient for dc planning at this time. Patient out of room for testing.              Continued Care and Services - Admitted Since 4/17/2023    Coordination has not been started for this encounter.          Demographic Summary    No documentation.                Functional Status    No documentation.                Psychosocial    No documentation.                Abuse/Neglect    No documentation.                Legal    No documentation.                Substance Abuse    No documentation.                Patient Forms    No documentation.                   Merlina A Fletcher, RN

## 2023-04-19 ENCOUNTER — READMISSION MANAGEMENT (OUTPATIENT)
Dept: CALL CENTER | Facility: HOSPITAL | Age: 63
End: 2023-04-19
Payer: OTHER GOVERNMENT

## 2023-04-19 VITALS
TEMPERATURE: 97.5 F | BODY MASS INDEX: 34 KG/M2 | WEIGHT: 251 LBS | OXYGEN SATURATION: 96 % | DIASTOLIC BLOOD PRESSURE: 71 MMHG | RESPIRATION RATE: 18 BRPM | SYSTOLIC BLOOD PRESSURE: 119 MMHG | HEART RATE: 50 BPM | HEIGHT: 72 IN

## 2023-04-19 LAB
ALBUMIN SERPL-MCNC: 4.3 G/DL (ref 3.5–5.2)
ALBUMIN/GLOB SERPL: 1.7 G/DL
ALP SERPL-CCNC: 78 U/L (ref 39–117)
ALT SERPL W P-5'-P-CCNC: 17 U/L (ref 1–41)
ANION GAP SERPL CALCULATED.3IONS-SCNC: 13 MMOL/L (ref 5–15)
AST SERPL-CCNC: 15 U/L (ref 1–40)
BILIRUB SERPL-MCNC: 0.9 MG/DL (ref 0–1.2)
BUN SERPL-MCNC: 25 MG/DL (ref 8–23)
BUN/CREAT SERPL: 18.2 (ref 7–25)
CALCIUM SPEC-SCNC: 9.7 MG/DL (ref 8.6–10.5)
CHLORIDE SERPL-SCNC: 101 MMOL/L (ref 98–107)
CO2 SERPL-SCNC: 24 MMOL/L (ref 22–29)
CREAT SERPL-MCNC: 1.37 MG/DL (ref 0.76–1.27)
DEPRECATED RDW RBC AUTO: 44.3 FL (ref 37–54)
EGFRCR SERPLBLD CKD-EPI 2021: 57.2 ML/MIN/1.73
ERYTHROCYTE [DISTWIDTH] IN BLOOD BY AUTOMATED COUNT: 13.2 % (ref 12.3–15.4)
GLOBULIN UR ELPH-MCNC: 2.6 GM/DL
GLUCOSE BLDC GLUCOMTR-MCNC: 167 MG/DL (ref 70–130)
GLUCOSE SERPL-MCNC: 189 MG/DL (ref 65–99)
HCT VFR BLD AUTO: 42.7 % (ref 37.5–51)
HGB BLD-MCNC: 13.4 G/DL (ref 13–17.7)
MCH RBC QN AUTO: 28.8 PG (ref 26.6–33)
MCHC RBC AUTO-ENTMCNC: 31.4 G/DL (ref 31.5–35.7)
MCV RBC AUTO: 91.6 FL (ref 79–97)
PLATELET # BLD AUTO: 130 10*3/MM3 (ref 140–450)
PMV BLD AUTO: 11.8 FL (ref 6–12)
POTASSIUM SERPL-SCNC: 4.4 MMOL/L (ref 3.5–5.2)
PROT SERPL-MCNC: 6.9 G/DL (ref 6–8.5)
RBC # BLD AUTO: 4.66 10*6/MM3 (ref 4.14–5.8)
SODIUM SERPL-SCNC: 138 MMOL/L (ref 136–145)
WBC NRBC COR # BLD: 6.35 10*3/MM3 (ref 3.4–10.8)

## 2023-04-19 PROCEDURE — 82962 GLUCOSE BLOOD TEST: CPT

## 2023-04-19 PROCEDURE — 85027 COMPLETE CBC AUTOMATED: CPT

## 2023-04-19 PROCEDURE — 63710000001 INSULIN DETEMIR PER 5 UNITS: Performed by: NURSE PRACTITIONER

## 2023-04-19 PROCEDURE — 63710000001 INSULIN LISPRO (HUMAN) PER 5 UNITS: Performed by: NURSE PRACTITIONER

## 2023-04-19 PROCEDURE — 80053 COMPREHEN METABOLIC PANEL: CPT | Performed by: NURSE PRACTITIONER

## 2023-04-19 RX ORDER — FUROSEMIDE 40 MG/1
40 TABLET ORAL DAILY PRN
Qty: 90 TABLET | Refills: 1 | Status: SHIPPED | OUTPATIENT
Start: 2023-04-19 | End: 2023-04-25 | Stop reason: SDUPTHER

## 2023-04-19 RX ORDER — FUROSEMIDE 40 MG/1
40 TABLET ORAL DAILY PRN
Qty: 90 TABLET | Refills: 1 | Status: SHIPPED | OUTPATIENT
Start: 2023-04-19

## 2023-04-19 RX ADMIN — Medication 250 MG: at 08:01

## 2023-04-19 RX ADMIN — PANTOPRAZOLE SODIUM 40 MG: 40 TABLET, DELAYED RELEASE ORAL at 08:01

## 2023-04-19 RX ADMIN — Medication 10 ML: at 08:03

## 2023-04-19 RX ADMIN — SPIRONOLACTONE 50 MG: 50 TABLET ORAL at 08:02

## 2023-04-19 RX ADMIN — EMPAGLIFLOZIN 10 MG: 10 TABLET, FILM COATED ORAL at 08:02

## 2023-04-19 RX ADMIN — GUAIFENESIN 600 MG: 600 TABLET, EXTENDED RELEASE ORAL at 08:02

## 2023-04-19 RX ADMIN — APIXABAN 5 MG: 5 TABLET, FILM COATED ORAL at 08:01

## 2023-04-19 RX ADMIN — INSULIN LISPRO 4 UNITS: 100 INJECTION, SOLUTION INTRAVENOUS; SUBCUTANEOUS at 08:30

## 2023-04-19 RX ADMIN — LAMOTRIGINE 200 MG: 100 TABLET ORAL at 08:01

## 2023-04-19 RX ADMIN — SACUBITRIL AND VALSARTAN 2 TABLET: 49; 51 TABLET, FILM COATED ORAL at 08:02

## 2023-04-19 RX ADMIN — ASPIRIN 81 MG: 81 TABLET, COATED ORAL at 08:01

## 2023-04-19 RX ADMIN — ISOSORBIDE MONONITRATE 120 MG: 60 TABLET, EXTENDED RELEASE ORAL at 08:01

## 2023-04-19 RX ADMIN — METOPROLOL TARTRATE 100 MG: 50 TABLET, FILM COATED ORAL at 08:02

## 2023-04-19 RX ADMIN — INSULIN DETEMIR 55 UNITS: 100 INJECTION, SOLUTION SUBCUTANEOUS at 08:41

## 2023-04-19 RX ADMIN — LEVETIRACETAM 1500 MG: 500 TABLET, FILM COATED ORAL at 08:01

## 2023-04-19 NOTE — DISCHARGE SUMMARY
[facility] Medicine Services  DISCHARGE SUMMARY       Date of Admission: 4/17/2023  Date of Discharge:  4/19/2023  Primary Care Physician: Vinayak Correia PA    Presenting Problem/History of Present Illness:  Chest tightness, shortness of breath, acute on chronic diastolic congestive heart failure    Final Discharge Diagnoses:  Active Hospital Problems    Diagnosis    • **Acute on chronic diastolic congestive heart failure    • Unstable angina pectoris    • Aneurysm of ascending aorta without rupture    • Chronic anticoagulation    • Bilateral pleural effusion    • Paroxysmal atrial fibrillation ([facility])    • Class 2 severe obesity due to excess calories with serious comorbidity and body mass index (BMI) of 35.0 to 35.9 in adult ([facility])    • Coronary artery disease of native artery of native heart with stable angina pectoris    • Type 2 diabetes mellitus with circulatory disorder, with long-term current use of insulin ([facility])    • Seizure disorder        Consults: Cardiology     Procedures Performed: None    Pertinent Test Results:   Results for orders placed during the hospital encounter of 04/17/23    Adult Transthoracic Echo Complete W/ Cont if Necessary Per Protocol    Interpretation Summary  •  Left ventricular systolic function is normal. Left ventricular ejection fraction appears to be 61 - 65%.  •  The following left ventricular wall segments are very mildly hypokinetic: apical inferior, apical septal and apex hypokinetic.  •  Left ventricular diastolic function is consistent with (grade II w/high LAP) pseudonormalization.  •  Left atrial volume is mildly increased.  •  Estimated right ventricular systolic pressure from tricuspid regurgitation is normal (<35 mmHg).  •  Normal size and function of the right ventricle.  •  No significant valvular pathology.  •  No significant change compared to prior exam from 4/29/21.      Imaging Results (All)     Procedure Component  Value Units Date/Time    CT Angiogram Chest [[id number]] Collected: 04/17/23 1337     Updated: 04/17/23 1404    Narrative:      EXAMINATION: CT ANGIOGRAM CHEST- 4/17/2023 1:37 PM CDT     HISTORY: Acute aortic syndrome (AAS) suspected. Chest pain and shortness  of breath.     DOSE: 439 mGycm (Automatic exposure control technique was implemented in  an effort to keep the radiation dose as low as possible without  compromising image quality)     REPORT:  Spiral CT of the chest was performed after administration of intravenous  contrast from the thoracic inlet through the upper abdomen using CTA  protocol, which includes reconstructed maximum intensity projection  (MIP)coronal and sagittal images.     Comparison: CTA chest 02/23/2023.     The contrast bolus is satisfactory, there is mild respiratory cardiac  motion artifact. There is mild dilation of the central pulmonary  arteries as before, pulmonary arterial hypertension is likely. The main  pulmonary artery segment measures 4.1 cm in diameter. No filling defects  are seen in the pulmonary arteries. Heart size appears be normal and the  RV LV ratio is less than 1, normal. In cross-section, the ascending  aorta has a slightly ovoid shape, currently measuring approximately 4.5  x 4.2 cm in diameter, compared with 4.2 x 3.9 cm at the same level on  the previous scan. At the arch level, the aorta has a maximum diameter  3.1 cm, which is normal. The descending thoracic aorta has a maximum  diameter 2.9 cm which is normal. No aortic dissection or aneurysm  rupture is identified. There is previous CABG. Heavy calcification seen  within the coronary arteries, greatest at the LAD distribution and there  appear to be stents within the LAD. There is trace left pleural effusion  and a small right pleural effusion is present. Review of lung windows  demonstrates no evidence of consolidating pneumonia. There is mild  bronchial wall thickening of the lung zones, greater on the  right. There  is subtle haziness at the bronchial branch points in the right lower  lobe, including a focal nodular infiltrates seen on images 90 through 93  of series 5.. There is a benign-appearing 4 mm subpleural nodule in the  lingula image 19 series 5. In the right lower lobe, there is a  noncalcified perifissural nodule that measures 4 x 10 mm, where before  there was only a 3 mm nodule in this location. This is compared to the  infectious or inflammatory but follow-up chest CT is recommended in 3  months. There is a subpleural nodule in the right lower lobe laterally  which is unchanged, image 113 series 5. This measures 5 mm. There is  mild subpleural scarring in the lateral aspect of the right middle lobe.  No pneumothorax is identified. The visualized upper abdomen is  unremarkable except for evidence previous cholecystectomy. Review of  bone windows shows no acute abnormality.       Impression:      1. Mild bronchial wall thickening and small nodular infiltrate in the  right lower lobe without consolidation likely represents a mild degree  of bronchopneumonia. There is an associated 4 x 10 mm nodule in the  right lower lobe which has increased since this compared with the most  recent CT when it measured 3 mm. This is likely infectious or  inflammatory, repeat chest CT is recommended in 3 months however to  ensure resolution.  2. Mild aneurysmal dilation of the ascending aorta measuring 4.5 x 4.2  cm, compared with 4.2 x 3.9 cm on the previous CT, without evidence of  dissection or aneurysm rupture.  3. Dilation of the central pulmonary arteries compatible pulmonary  arterial hypertension.  4. Extensive coronary artery disease as before. There is previous median  sternotomy.  5. Trace left pleural effusion and a small right pleural effusion is  present.           This report was finalized on 04/17/2023 13:53 by Dr. Cyril Reyna MD.    XR Chest 1 View [[id number]] Collected: 04/17/23 1109     Updated:  04/17/23 1114    Narrative:      EXAMINATION: XR CHEST 1 VW- 4/17/2023 11:09 AM CDT     HISTORY: Chest Pain Triage Protocol     REPORT: A frontal view of the chest was obtained.     COMPARISON: Chest x-ray 10/20/2022.     There is volume loss in the lung bases, increased central and basilar  vascular congestion is noted in the heart is mildly enlarged. No focal  pulmonary infiltrate or consolidation is identified. There is previous  CABG. No pneumothorax or pleural effusion is seen. The osseous  structures and upper abdomen are unremarkable.       Impression:      Mild cardiomegaly and mild vascular congestion centrally  probably related to mild CHF.  This report was finalized on 04/17/2023 11:11 by Dr. Cyril Reyna MD.        LAB RESULTS:      Lab 04/19/23  0411 04/18/23  0340 04/17/23  1109   WBC 6.35 5.72 6.72   HEMOGLOBIN 13.4 13.4 13.5   HEMATOCRIT 42.7 42.1 41.3   PLATELETS 130* 112* 106*   NEUTROS ABS  --   --  4.83   LYMPHS ABS  --   --  1.09   MONOS ABS  --   --  0.58   EOS ABS  --   --  0.16   MCV 91.6 91.3 91.0   D DIMER QUANT  --   --  1.09*         Lab 04/19/23  0411 04/18/23  0340 04/17/23  1220   SODIUM 138 136 135*   POTASSIUM 4.4 4.1 4.3   CHLORIDE 101 99 100   CO2 24.0 26.0 23.0   ANION GAP 13.0 11.0 12.0   BUN 25* 17 12   CREATININE 1.37* 1.24 0.94   EGFR 57.2* 64.5 90.0   GLUCOSE 189* 303* 230*   CALCIUM 9.7 10.1 9.8   HEMOGLOBIN A1C  --  7.10*  --          Lab 04/19/23  0411 04/17/23  1220   TOTAL PROTEIN 6.9 7.6   ALBUMIN 4.3 4.6   GLOBULIN 2.6 3.0   ALT (SGPT) 17 18   AST (SGOT) 15 19   BILIRUBIN 0.9 1.3*   ALK PHOS 78 86         Lab 04/17/23  1817 04/17/23  1530 04/17/23  1220 04/17/23  1109   PROBNP  --   --   --  1,249.0*   HSTROP T 13 18* 13  --          Lab 04/18/23  0340   CHOLESTEROL 105   LDL CHOL 48   HDL CHOL 29*   TRIGLYCERIDES 163*             Brief Urine Lab Results  (Last result in the past 365 days)      Color   Clarity   Blood   Leuk Est   Nitrite   Protein   CREAT   Urine  HCG        02/14/23 0948 Yellow   Clear   Large   Trace   Negative   30 mg/dL               Microbiology Results (last 10 days)     Procedure Component Value - Date/Time    Blood Culture - Blood, Hand, Right [[id number]]  (Normal) Collected: 04/17/23 1530    Lab Status: Preliminary result Specimen: Blood from Hand, Right Updated: 04/18/23 1546     Blood Culture No growth at 24 hours    Blood Culture - Blood, Arm, Left [[id number]]  (Normal) Collected: 04/17/23 1446    Lab Status: Preliminary result Specimen: Blood from Arm, Left Updated: 04/18/23 1546     Blood Culture No growth at 24 hours          Hospital Course:   Upon arrival:  Patient presented to [facility] emergency department on 4/17/2023 with complaints of chest pressure without nausea or diaphoresis.  Troponins were elevated but flat.  Tridil drip was originally utilized and discontinued thereafter.  Patient's other complaint was shortness of breath worsening over the last 7 to 10 days.  He also reported orthopnea.  He was admitted for inpatient monitoring and management with a cardiology consult.    Thereafter:  Acute on chronic diastolic CHF-  Patient evaluated by Dr. Ramey, cardiology 4/14/2023 with plans for echo with possible CardioMEMS placement 5/3/2023. Cardiology- Dr. Llamas and TEENA Andersen evaluated patient.  Patient diuresed with twice daily IV Lasix during hospitalization.  PTA jardiance, metoprolol, aldactone, entresto were also continued.  Upon my exam this morning, patient has lost about 10 pounds since arrival.  I believe he is euvolemic today.  His weight is 250 pounds.  He is on room air without chest pain, shortness of breath, palpitations.  Appropriate for discharge with close heart failure follow.  Per Dr. Llamas recommendations, will flex dose Lasix with weight-based protocol.    Paroxysmal A-fib-  Eliquis, metoprolol bid. Rate controlled.      Cx pain with CAD history-  Nitro gtt off. Cardiology evaluated, felt immediate risk  "for ACS was low. Cx pain free now. Troponin flat. ASA, imdur, crestor, lopressor. LDL 48     DM-  Hgb A1C 7.1 Levemir 110 units bid and high dose SSI.   Continue upon discharge.     Ascending aortic aneurysm - followed by      Blood culture x2 pending?  No signs of infection and laboratory results or upon physical exam.  Patient shows significant improvement during hospitalization with IV diuresis.  Creatinine and vital signs remained stable.  Recommend follow-up within 6 days, BMP at that time.        Physical Exam on Discharge:  /51 (BP Location: Left arm, Patient Position: Lying)   Pulse 57   Temp 98.4 °F (36.9 °C) (Oral)   Resp 18   Ht 182.9 cm (72\")   Wt 114 kg (252 lb)   SpO2 95%   BMI 34.18 kg/m²   Physical Exam  Vitals reviewed.   Constitutional:       Appearance: Normal appearance. He is obese.   HENT:      Head: Normocephalic and atraumatic.      Mouth/Throat:      Mouth: Mucous membranes are moist.      Pharynx: Oropharynx is clear.   Eyes:      Extraocular Movements: Extraocular movements intact.      Conjunctiva/sclera: Conjunctivae normal.   Cardiovascular:      Rate and Rhythm: Normal rate and regular rhythm.      Heart sounds: Murmur heard.   Pulmonary:      Effort: Pulmonary effort is normal.     Breath sounds:  Faint rales that were auscultated yesterday are resolved.  Abdominal:      Palpations: Abdomen is soft.      Comments: Protuberant   Musculoskeletal:         General: Normal range of motion.      Cervical back: Normal range of motion and neck supple.      Right lower leg: No edema.      Left lower leg: No edema.   Skin:     General: Skin is warm and dry.  Scaly and dry, worse on back and legs. No significant pitting edema in lower extremities.   Patient states that this is baseline  Neurological:      General: No focal deficit present.      Mental Status: He is alert and oriented to person, place, and time.   Psychiatric:         Mood and Affect: Mood normal. "         Behavior: Behavior normal.     Condition on Discharge: Stable    Discharge Disposition:Home or Self Care Discharge home    Discharge Medications:     Discharge Medications      New Medications      Instructions Start Date   furosemide 40 MG tablet  Commonly known as: Lasix   40 mg, Oral, Daily PRN      furosemide 40 MG tablet  Commonly known as: Lasix   40 mg, Oral, Daily PRN         Continue These Medications      Instructions Start Date   acetaminophen 325 MG tablet  Commonly known as: TYLENOL   325 mg, Oral, Every 6 Hours PRN      albuterol sulfate  (90 Base) MCG/ACT inhaler  Commonly known as: PROVENTIL HFA;VENTOLIN HFA;PROAIR HFA   2 puffs, Inhalation, Every 6 Hours PRN      apixaban 5 MG tablet tablet  Commonly known as: ELIQUIS   5 mg, Oral, 2 Times Daily      Aspirin 81 MG capsule   81 mg, Oral, Daily      carboxymethylcellulose 0.5 % solution  Commonly known as: REFRESH PLUS   1 drop, Both Eyes, 4 Times Daily PRN      empagliflozin 10 MG tablet tablet  Commonly known as: JARDIANCE   10 mg, Oral, Daily      Entresto  MG tablet  Generic drug: sacubitril-valsartan   1 tablet, Oral, 2 Times Daily      ezetimibe 10 MG tablet  Commonly known as: ZETIA   10 mg, Oral, Daily      fluticasone 50 MCG/ACT nasal spray  Commonly known as: FLONASE   2 sprays, Nasal, Daily PRN      gabapentin 300 MG capsule  Commonly known as: NEURONTIN   300 mg, Oral, Nightly      guaiFENesin 600 MG 12 hr tablet  Commonly known as: MUCINEX   1,200 mg, Oral, Daily PRN      insulin aspart 100 UNIT/ML solution pen-injector sc pen  Commonly known as: novoLOG FLEXPEN   44 Units, Subcutaneous, 3 Times Daily With Meals, Can use 5 to 6 more units if needed in early evening.      insulin detemir 100 UNIT/ML injection  Commonly known as: LEVEMIR   110 Units, Subcutaneous, 2 Times Daily      isosorbide mononitrate 120 MG 24 hr tablet  Commonly known as: IMDUR   120 mg, Oral, Daily      lamoTRIgine 200 MG tablet  Commonly known  as: LaMICtal   200 mg, Oral, 2 Times Daily      levETIRAcetam 500 MG tablet  Commonly known as: KEPPRA   1,500 mg, Oral, Every Morning      levETIRAcetam 500 MG tablet  Commonly known as: KEPPRA   2,000 mg, Oral, Nightly      metFORMIN 1000 MG tablet  Commonly known as: GLUCOPHAGE   1,000 mg, Oral, 2 Times Daily With Meals      metoprolol tartrate 100 MG tablet  Commonly known as: LOPRESSOR   100 mg, Oral, 2 Times Daily, Told to take the DOS-2/27/23, w/ sip of water.      multivitamin tablet tablet  Commonly known as: THERAGRAN   1 tablet, Oral, Daily      nitroglycerin 0.4 MG SL tablet  Commonly known as: NITROSTAT   0.4 mg, Sublingual, Every 5 Minutes PRN, Take no more than 3 doses in 15 minutes.       Nurtec 75 MG tablet dispersible tablet  Generic drug: Rimegepant Sulfate   75 mg, Oral, As Needed      pantoprazole 40 MG EC tablet  Commonly known as: PROTONIX   40 mg, Oral, 2 Times Daily      polycarbophil 625 MG tablet tablet   625 mg, Oral, Daily PRN      psyllium 58.6 % packet  Commonly known as: METAMUCIL   1 packet, Oral, Daily PRN      rosuvastatin 20 MG tablet  Commonly known as: CRESTOR   20 mg, Oral, Nightly      spironolactone 50 MG tablet  Commonly known as: ALDACTONE   50 mg, Oral, Daily      tamsulosin 0.4 MG capsule 24 hr capsule  Commonly known as: FLOMAX   2 capsules, Oral, Nightly           Discharge Diet:   Diet Instructions     Diet: Cardiac Diets; Low Sodium (2g)      Discharge Diet: Cardiac Diets    Cardiac Diet: Low Sodium (2g)    Texture: Regular Texture (IDDSI 7)    Fluid Consistency: Thin (IDDSI 0)    Diet: Cardiac Diets; Low Sodium (2g); Regular Texture (IDDSI 7); Thin (IDDSI 0)      Discharge Diet: Cardiac Diets    Cardiac Diet: Low Sodium (2g)    Texture: Regular Texture (IDDSI 7)    Fluid Consistency: Thin (IDDSI 0)          Activity at Discharge:   Activity Instructions     Measure Weight Daily      Instruct patient on daily weights          Follow-up Appointments:   Cardiology  within 6 days  PCP in 1 week  Future Appointments   Date Time Provider Department Center   5/3/2023 11:00 AM [facility] ECHO ROOM 3  [facility] CARDI [facility]   5/11/2023 11:15 AM Mayank Ibarra MD [facility] ENT [facility]   7/20/2023 10:45 AM Agnieszka Jeff APRN [facility] CD  [facility]   7/26/2023  9:00 AM [facility] US N[facility]AS CART 3  [facility]   7/26/2023 10:00 AM Arabella Yanes APRN [facility] VS [facility]AD   8/22/2023  8:30 AM Flako Freeman APRN [facility] N [facility]AD PAD   9/6/2023  8:10 AM DOMENIC GONZALEZ UROLOGY [facility] U PAD PAD   9/13/2023  9:15 AM Asif Perez PA [facility] U [facility]AD PAD   2/14/2024 10:00 AM Skye Olson APRN [facility] CTS  [facility] PAD       Test Results Pending at Discharge: Blood culture x2, no growth at this time, follow to completion.    Electronically signed by TEENA Starr, 04/19/23, 07:45 CDT.    Time: 35 minutes.

## 2023-04-19 NOTE — PLAN OF CARE
Goal Outcome Evaluation:  Plan of Care Reviewed With: patient        Progress: improving  Outcome Evaluation: VSS.  SB/S 58-74 on tele.  No c/o pain or SOB during shift.  Edema decreasing.  Patient rested well overnight.  Anticipating d/c today.  Safety maintained.

## 2023-04-20 NOTE — PAYOR COMM NOTE
"REF   [id number]    [facility]  [place],   [phone]  OR  FAX   [phone]         Carlos A Goldstein Jr. (63 y.o. Male)     Date of Birth   1960    Social Security Number       Address   [address] [facility] [address]    Home Phone   [phone]    MRN   [id number]       [employer]   [facility]    Marital Status                               Admission Date   4/17/23    Admission Type   Emergency    Admitting Provider   Ranjit Carter DO    Attending Provider       Department, Room/Bed   [facility] 4B, 447/1       Discharge Date   4/19/2023    Discharge Disposition   Home or Self Care    Discharge Destination                               Attending Provider: (none)   Allergies: Flagyl [Metronidazole], Atorvastatin, Ciprofloxacin    Isolation: None   Infection: COVID (History) (04/29/21)   Code Status: Prior    Ht: 182.9 cm (72\")   Wt: 114 kg (251 lb)    Admission Cmt: None   Principal Problem: Acute on chronic diastolic congestive heart failure [I50.33]                 Active Insurance as of 4/17/2023     Primary Coverage     Payor Plan Insurance Group Employer/Plan Group    [facility] DEPT [id number]      Payor Plan Address Payor Plan Phone Number Payor Plan Fax Number Effective Dates    [facility] OFFICE OF COMMUNITY CARE [phone]  2/23/2023 - None Entered    [address] [address]       [facility] [address]       Subscriber Name Subscriber Birth Date Member ID       CARLOS A GOLDSTEIN JR. 1960 [id number]           Secondary Coverage     Payor Plan Insurance Group Employer/Plan Group    [facility] CCN OPTUM      Payor Plan Address Payor Plan Phone Number Payor Plan Fax Number Effective Dates    [id number] [phone]  6/1/2020 - None Entered    [facility] [address]       Subscriber Name Subscriber Birth Date Member ID       CARLOS A GOLDSTEIN JR. 1960 [id number]           Tertiary Coverage     Payor Plan Insurance Group Employer/Plan Group    " HUMANA MEDICARE REPLACEMENT HUMANA MEDICARE REPLACEMENT [id number]     Payor Plan Address Payor Plan Phone Number Payor Plan Fax Number Effective Dates    [address] [address] [phone]  1/1/2019 - None Entered    [facility] [address]       Subscriber Name Subscriber Birth Date Member ID       SHANNAN BOYER JR. 1960 [id number]                 Emergency Contacts      (Rel.) Home Phone Work Phone Mobile Phone    Daniela Boyer (Spouse) -- [phone]               Discharge Summary      Nick Carolina APRN at 04/19/23 0723     Attestation signed by Ranjit Carter DO at 04/19/23 1720    This visit was performed by both a physician and an APC. I personally evaluated and examined the patient. I performed all aspects of the MDM as documented.    Patient seen ambulating the hallways.  Looks well.  Feels better.  No chest pain.  No dyspnea.  Swelling improved.  Will discharge home today and continue plans and meds as below.  Follow-up with PCP and cardiology as an outpatient.      Electronically signed by Ranjit Carter DO, 4/19/2023, 17:19 CDT.                          [facility] Medicine Services  DISCHARGE SUMMARY       Date of Admission: 4/17/2023  Date of Discharge:  4/19/2023  Primary Care Physician: Vinayak Correia PA    Presenting Problem/History of Present Illness:  Chest tightness, shortness of breath, acute on chronic diastolic congestive heart failure    Final Discharge Diagnoses:  Active Hospital Problems    Diagnosis    • **Acute on chronic diastolic congestive heart failure    • Unstable angina pectoris    • Aneurysm of ascending aorta without rupture    • Chronic anticoagulation    • Bilateral pleural effusion    • Paroxysmal atrial fibrillation (HCC)    • Class 2 severe obesity due to excess calories with serious comorbidity and body mass index (BMI) of 35.0 to 35.9 in adult (HCC)    • Coronary artery disease of native artery of native  heart with stable angina pectoris    • Type 2 diabetes mellitus with circulatory disorder, with long-term current use of insulin (HCC)    • Seizure disorder        Consults: Cardiology     Procedures Performed: None    Pertinent Test Results:   Results for orders placed during the hospital encounter of 04/17/23    Adult Transthoracic Echo Complete W/ Cont if Necessary Per Protocol    Interpretation Summary  •  Left ventricular systolic function is normal. Left ventricular ejection fraction appears to be 61 - 65%.  •  The following left ventricular wall segments are very mildly hypokinetic: apical inferior, apical septal and apex hypokinetic.  •  Left ventricular diastolic function is consistent with (grade II w/high LAP) pseudonormalization.  •  Left atrial volume is mildly increased.  •  Estimated right ventricular systolic pressure from tricuspid regurgitation is normal (<35 mmHg).  •  Normal size and function of the right ventricle.  •  No significant valvular pathology.  •  No significant change compared to prior exam from 4/29/21.      Imaging Results (All)     Procedure Component Value Units Date/Time    CT Angiogram Chest [[id number]] Collected: 04/17/23 1337     Updated: 04/17/23 1404    Narrative:      EXAMINATION: CT ANGIOGRAM CHEST- 4/17/2023 1:37 PM CDT     HISTORY: Acute aortic syndrome (AAS) suspected. Chest pain and shortness  of breath.     DOSE: 439 mGycm (Automatic exposure control technique was implemented in  an effort to keep the radiation dose as low as possible without  compromising image quality)     REPORT:  Spiral CT of the chest was performed after administration of intravenous  contrast from the thoracic inlet through the upper abdomen using CTA  protocol, which includes reconstructed maximum intensity projection  (MIP)coronal and sagittal images.     Comparison: CTA chest 02/23/2023.     The contrast bolus is satisfactory, there is mild respiratory cardiac  motion artifact. There is mild  dilation of the central pulmonary  arteries as before, pulmonary arterial hypertension is likely. The main  pulmonary artery segment measures 4.1 cm in diameter. No filling defects  are seen in the pulmonary arteries. Heart size appears be normal and the  RV LV ratio is less than 1, normal. In cross-section, the ascending  aorta has a slightly ovoid shape, currently measuring approximately 4.5  x 4.2 cm in diameter, compared with 4.2 x 3.9 cm at the same level on  the previous scan. At the arch level, the aorta has a maximum diameter  3.1 cm, which is normal. The descending thoracic aorta has a maximum  diameter 2.9 cm which is normal. No aortic dissection or aneurysm  rupture is identified. There is previous CABG. Heavy calcification seen  within the coronary arteries, greatest at the LAD distribution and there  appear to be stents within the LAD. There is trace left pleural effusion  and a small right pleural effusion is present. Review of lung windows  demonstrates no evidence of consolidating pneumonia. There is mild  bronchial wall thickening of the lung zones, greater on the right. There  is subtle haziness at the bronchial branch points in the right lower  lobe, including a focal nodular infiltrates seen on images 90 through 93  of series 5.. There is a benign-appearing 4 mm subpleural nodule in the  lingula image 19 series 5. In the right lower lobe, there is a  noncalcified perifissural nodule that measures 4 x 10 mm, where before  there was only a 3 mm nodule in this location. This is compared to the  infectious or inflammatory but follow-up chest CT is recommended in 3  months. There is a subpleural nodule in the right lower lobe laterally  which is unchanged, image 113 series 5. This measures 5 mm. There is  mild subpleural scarring in the lateral aspect of the right middle lobe.  No pneumothorax is identified. The visualized upper abdomen is  unremarkable except for evidence previous cholecystectomy.  Review of  bone windows shows no acute abnormality.       Impression:      1. Mild bronchial wall thickening and small nodular infiltrate in the  right lower lobe without consolidation likely represents a mild degree  of bronchopneumonia. There is an associated 4 x 10 mm nodule in the  right lower lobe which has increased since this compared with the most  recent CT when it measured 3 mm. This is likely infectious or  inflammatory, repeat chest CT is recommended in 3 months however to  ensure resolution.  2. Mild aneurysmal dilation of the ascending aorta measuring 4.5 x 4.2  cm, compared with 4.2 x 3.9 cm on the previous CT, without evidence of  dissection or aneurysm rupture.  3. Dilation of the central pulmonary arteries compatible pulmonary  arterial hypertension.  4. Extensive coronary artery disease as before. There is previous median  sternotomy.  5. Trace left pleural effusion and a small right pleural effusion is  present.           This report was finalized on 04/17/2023 13:53 by Dr. Cyril Reyna MD.    XR Chest 1 View [[id number]] Collected: 04/17/23 1109     Updated: 04/17/23 1114    Narrative:      EXAMINATION: XR CHEST 1 VW- 4/17/2023 11:09 AM CDT     HISTORY: Chest Pain Triage Protocol     REPORT: A frontal view of the chest was obtained.     COMPARISON: Chest x-ray 10/20/2022.     There is volume loss in the lung bases, increased central and basilar  vascular congestion is noted in the heart is mildly enlarged. No focal  pulmonary infiltrate or consolidation is identified. There is previous  CABG. No pneumothorax or pleural effusion is seen. The osseous  structures and upper abdomen are unremarkable.       Impression:      Mild cardiomegaly and mild vascular congestion centrally  probably related to mild CHF.  This report was finalized on 04/17/2023 11:11 by Dr. Cyril Reyna MD.        LAB RESULTS:      Lab 04/19/23  0411 04/18/23  0340 04/17/23  1109   WBC 6.35 5.72 6.72   HEMOGLOBIN 13.4 13.4  13.5   HEMATOCRIT 42.7 42.1 41.3   PLATELETS 130* 112* 106*   NEUTROS ABS  --   --  4.83   LYMPHS ABS  --   --  1.09   MONOS ABS  --   --  0.58   EOS ABS  --   --  0.16   MCV 91.6 91.3 91.0   D DIMER QUANT  --   --  1.09*         Lab 04/19/23  0411 04/18/23  0340 04/17/23  1220   SODIUM 138 136 135*   POTASSIUM 4.4 4.1 4.3   CHLORIDE 101 99 100   CO2 24.0 26.0 23.0   ANION GAP 13.0 11.0 12.0   BUN 25* 17 12   CREATININE 1.37* 1.24 0.94   EGFR 57.2* 64.5 90.0   GLUCOSE 189* 303* 230*   CALCIUM 9.7 10.1 9.8   HEMOGLOBIN A1C  --  7.10*  --          Lab 04/19/23  0411 04/17/23  1220   TOTAL PROTEIN 6.9 7.6   ALBUMIN 4.3 4.6   GLOBULIN 2.6 3.0   ALT (SGPT) 17 18   AST (SGOT) 15 19   BILIRUBIN 0.9 1.3*   ALK PHOS 78 86         Lab 04/17/23  1817 04/17/23  1530 04/17/23  1220 04/17/23  1109   PROBNP  --   --   --  1,249.0*   HSTROP T 13 18* 13  --          Lab 04/18/23  0340   CHOLESTEROL 105   LDL CHOL 48   HDL CHOL 29*   TRIGLYCERIDES 163*             Brief Urine Lab Results  (Last result in the past 365 days)      Color   Clarity   Blood   Leuk Est   Nitrite   Protein   CREAT   Urine HCG        02/14/23 0948 Yellow   Clear   Large   Trace   Negative   30 mg/dL               Microbiology Results (last 10 days)     Procedure Component Value - Date/Time    Blood Culture - Blood, Hand, Right [[id number]]  (Normal) Collected: 04/17/23 1530    Lab Status: Preliminary result Specimen: Blood from Hand, Right Updated: 04/18/23 1546     Blood Culture No growth at 24 hours    Blood Culture - Blood, Arm, Left [[id number]]  (Normal) Collected: 04/17/23 1446    Lab Status: Preliminary result Specimen: Blood from Arm, Left Updated: 04/18/23 1546     Blood Culture No growth at 24 hours          Hospital Course:   Upon arrival:  Patient presented to [facility] emergency department on 4/17/2023 with complaints of chest pressure without nausea or diaphoresis.  Troponins were elevated but flat.  Tridil drip was originally utilized and  "discontinued thereafter.  Patient's other complaint was shortness of breath worsening over the last 7 to 10 days.  He also reported orthopnea.  He was admitted for inpatient monitoring and management with a cardiology consult.    Thereafter:  Acute on chronic diastolic CHF-  Patient evaluated by Dr. Ramey, cardiology 4/14/2023 with plans for echo with possible CardioMEMS placement 5/3/2023. Cardiology- Dr. Lalmas and TEENA Andersen evaluated patient.  Patient diuresed with twice daily IV Lasix during hospitalization.  PTA jardiance, metoprolol, aldactone, entresto were also continued.  Upon my exam this morning, patient has lost about 10 pounds since arrival.  I believe he is euvolemic today.  His weight is 250 pounds.  He is on room air without chest pain, shortness of breath, palpitations.  Appropriate for discharge with close heart failure follow.  Per Dr. Llamas recommendations, will flex dose Lasix with weight-based protocol.    Paroxysmal A-fib-  Eliquis, metoprolol bid. Rate controlled.      Cx pain with CAD history-  Nitro gtt off. Cardiology evaluated, felt immediate risk for ACS was low. Cx pain free now. Troponin flat. ASA, imdur, crestor, lopressor. LDL 48     DM-  Hgb A1C 7.1 Levemir 110 units bid and high dose SSI.   Continue upon discharge.     Ascending aortic aneurysm - followed by      Blood culture x2 pending?  No signs of infection and laboratory results or upon physical exam.  Patient shows significant improvement during hospitalization with IV diuresis.  Creatinine and vital signs remained stable.  Recommend follow-up within 6 days, BMP at that time.        Physical Exam on Discharge:  /51 (BP Location: Left arm, Patient Position: Lying)   Pulse 57   Temp 98.4 °F (36.9 °C) (Oral)   Resp 18   Ht 182.9 cm (72\")   Wt 114 kg (252 lb)   SpO2 95%   BMI 34.18 kg/m²   Physical Exam  Vitals reviewed.   Constitutional:       Appearance: Normal appearance. He is obese.   HENT: "      Head: Normocephalic and atraumatic.      Mouth/Throat:      Mouth: Mucous membranes are moist.      Pharynx: Oropharynx is clear.   Eyes:      Extraocular Movements: Extraocular movements intact.      Conjunctiva/sclera: Conjunctivae normal.   Cardiovascular:      Rate and Rhythm: Normal rate and regular rhythm.      Heart sounds: Murmur heard.   Pulmonary:      Effort: Pulmonary effort is normal.     Breath sounds:  Faint rales that were auscultated yesterday are resolved.  Abdominal:      Palpations: Abdomen is soft.      Comments: Protuberant   Musculoskeletal:         General: Normal range of motion.      Cervical back: Normal range of motion and neck supple.      Right lower leg: No edema.      Left lower leg: No edema.   Skin:     General: Skin is warm and dry.  Scaly and dry, worse on back and legs. No significant pitting edema in lower extremities.   Patient states that this is baseline  Neurological:      General: No focal deficit present.      Mental Status: He is alert and oriented to person, place, and time.   Psychiatric:         Mood and Affect: Mood normal.         Behavior: Behavior normal.     Condition on Discharge: Stable    Discharge Disposition:Home or Self Care Discharge home    Discharge Medications:     Discharge Medications      New Medications      Instructions Start Date   furosemide 40 MG tablet  Commonly known as: Lasix   40 mg, Oral, Daily PRN      furosemide 40 MG tablet  Commonly known as: Lasix   40 mg, Oral, Daily PRN         Continue These Medications      Instructions Start Date   acetaminophen 325 MG tablet  Commonly known as: TYLENOL   325 mg, Oral, Every 6 Hours PRN      albuterol sulfate  (90 Base) MCG/ACT inhaler  Commonly known as: PROVENTIL HFA;VENTOLIN HFA;PROAIR HFA   2 puffs, Inhalation, Every 6 Hours PRN      apixaban 5 MG tablet tablet  Commonly known as: ELIQUIS   5 mg, Oral, 2 Times Daily      Aspirin 81 MG capsule   81 mg, Oral, Daily       carboxymethylcellulose 0.5 % solution  Commonly known as: REFRESH PLUS   1 drop, Both Eyes, 4 Times Daily PRN      empagliflozin 10 MG tablet tablet  Commonly known as: JARDIANCE   10 mg, Oral, Daily      Entresto  MG tablet  Generic drug: sacubitril-valsartan   1 tablet, Oral, 2 Times Daily      ezetimibe 10 MG tablet  Commonly known as: ZETIA   10 mg, Oral, Daily      fluticasone 50 MCG/ACT nasal spray  Commonly known as: FLONASE   2 sprays, Nasal, Daily PRN      gabapentin 300 MG capsule  Commonly known as: NEURONTIN   300 mg, Oral, Nightly      guaiFENesin 600 MG 12 hr tablet  Commonly known as: MUCINEX   1,200 mg, Oral, Daily PRN      insulin aspart 100 UNIT/ML solution pen-injector sc pen  Commonly known as: novoLOG FLEXPEN   44 Units, Subcutaneous, 3 Times Daily With Meals, Can use 5 to 6 more units if needed in early evening.      insulin detemir 100 UNIT/ML injection  Commonly known as: LEVEMIR   110 Units, Subcutaneous, 2 Times Daily      isosorbide mononitrate 120 MG 24 hr tablet  Commonly known as: IMDUR   120 mg, Oral, Daily      lamoTRIgine 200 MG tablet  Commonly known as: LaMICtal   200 mg, Oral, 2 Times Daily      levETIRAcetam 500 MG tablet  Commonly known as: KEPPRA   1,500 mg, Oral, Every Morning      levETIRAcetam 500 MG tablet  Commonly known as: KEPPRA   2,000 mg, Oral, Nightly      metFORMIN 1000 MG tablet  Commonly known as: GLUCOPHAGE   1,000 mg, Oral, 2 Times Daily With Meals      metoprolol tartrate 100 MG tablet  Commonly known as: LOPRESSOR   100 mg, Oral, 2 Times Daily, Told to take the DOS-2/27/23, w/ sip of water.      multivitamin tablet tablet  Commonly known as: THERAGRAN   1 tablet, Oral, Daily      nitroglycerin 0.4 MG SL tablet  Commonly known as: NITROSTAT   0.4 mg, Sublingual, Every 5 Minutes PRN, Take no more than 3 doses in 15 minutes.       Nurtec 75 MG tablet dispersible tablet  Generic drug: Rimegepant Sulfate   75 mg, Oral, As Needed      pantoprazole 40 MG EC  tablet  Commonly known as: PROTONIX   40 mg, Oral, 2 Times Daily      polycarbophil 625 MG tablet tablet   625 mg, Oral, Daily PRN      psyllium 58.6 % packet  Commonly known as: METAMUCIL   1 packet, Oral, Daily PRN      rosuvastatin 20 MG tablet  Commonly known as: CRESTOR   20 mg, Oral, Nightly      spironolactone 50 MG tablet  Commonly known as: ALDACTONE   50 mg, Oral, Daily      tamsulosin 0.4 MG capsule 24 hr capsule  Commonly known as: FLOMAX   2 capsules, Oral, Nightly           Discharge Diet:   Diet Instructions     Diet: Cardiac Diets; Low Sodium (2g)      Discharge Diet: Cardiac Diets    Cardiac Diet: Low Sodium (2g)    Texture: Regular Texture (IDDSI 7)    Fluid Consistency: Thin (IDDSI 0)    Diet: Cardiac Diets; Low Sodium (2g); Regular Texture (IDDSI 7); Thin (IDDSI 0)      Discharge Diet: Cardiac Diets    Cardiac Diet: Low Sodium (2g)    Texture: Regular Texture (IDDSI 7)    Fluid Consistency: Thin (IDDSI 0)          Activity at Discharge:   Activity Instructions     Measure Weight Daily      Instruct patient on daily weights          Follow-up Appointments:   Cardiology within 6 days  PCP in 1 week  Future Appointments   Date Time Provider Department Center   5/3/2023 11:00 AM PAD ECHO ROOM 3 [facility] PAD CARDI PAD   5/11/2023 11:15 AM Mayank Ibarra MD [facility] ENT PAD PAD   7/20/2023 10:45 AM Agnieszka Jeff APRN [facility] CD  PAD   7/26/2023  9:00 AM PAD US NIVAS CART 3  PAD NIVAS PAD   7/26/2023 10:00 AM Arabella Yanes APRN [facility] VS PAD PAD   8/22/2023  8:30 AM Flako Freeman APRN [facility] N PAD PAD   9/6/2023  8:10 AM DOMENIC GONZALEZ UROLOGY PAD [facility] U PAD PAD   9/13/2023  9:15 AM Asif Perez PA [facility] U PAD PAD   2/14/2024 10:00 AM Skye Olson APRN [facility] CTS  PAD PAD       Test Results Pending at Discharge: Blood culture x2, no growth at this time, follow to completion.    Electronically signed by TEENA Starr, 04/19/23, 07:45 CDT.    Time: 35 minutes.         Electronically  signed by Anival Carter, DO at 04/19/23 1720       Discharge Order (From admission, onward)     Start     Ordered    04/19/23 0733  Discharge patient  Once        Expected Discharge Date: 04/19/23    Discharge Disposition: Home or Self Care    Physician of Record for Attribution - Please select from Treatment Team: ANIVAL CARTER [[id number]]    Review needed by CMO to determine Physician of Record: No       Question Answer Comment   Physician of Record for Attribution - Please select from Treatment Team ANIVAL CARTER    Review needed by CMO to determine Physician of Record No        04/19/23 0741

## 2023-04-20 NOTE — OUTREACH NOTE
Prep Survey    Flowsheet Row Responses   [facility] facility patient discharged from? [facility]   Is LACE score < 7 ? No   Eligibility Readm Mgmt   Discharge diagnosis CHF   Does the patient have one of the following disease processes/diagnoses(primary or secondary)? CHF   Is there a DME ordered? No   Prep survey completed? Yes          Germaine GERBER - Registered Nurse

## 2023-04-20 NOTE — PAYOR COMM NOTE
"REF  [id number]    [facility]  [place],   [phone]  OR  FAX  [phone]       Carlos A Goldstein Jr. (63 y.o. Male)     Date of Birth   1960    Social Security Number       Address   [address] [facility] [address]    Home Phone   [phone]    MRN   [id number]       [place]   [facility]    Marital Status                               Admission Date   4/17/23    Admission Type   Emergency    Admitting Provider   Ranjit Carter DO    Attending Provider       Department, Room/Bed   [facility] 4B, 447/1       Discharge Date   4/19/2023    Discharge Disposition   Home or Self Care    Discharge Destination                               Attending Provider: (none)   Allergies: Flagyl [Metronidazole], Atorvastatin, Ciprofloxacin    Isolation: None   Infection: COVID (History) (04/29/21)   Code Status: Prior    Ht: 182.9 cm (72\")   Wt: 114 kg (251 lb)    Admission Cmt: None   Principal Problem: Acute on chronic diastolic congestive heart failure [I50.33]                 Active Insurance as of 4/17/2023     Primary Coverage     Payor Plan Insurance Group Employer/Plan Group    [facility] DEPT [id number]      Payor Plan Address Payor Plan Phone Number Payor Plan Fax Number Effective Dates    [facility] OFFICE OF COMMUNITY CARE [phone]  2/23/2023 - None Entered    [address] [address]       [facility] [address]       Subscriber Name Subscriber Birth Date Member ID       CARLOS A GOLDSTEIN JR. 1960 [id number]           Secondary Coverage     Payor Plan Insurance Group Employer/Plan Group    [facility] CCN OPTUM      Payor Plan Address Payor Plan Phone Number Payor Plan Fax Number Effective Dates    [id number] [phone]  6/1/2020 - None Entered    [facility] [address]       Subscriber Name Subscriber Birth Date Member ID       CARLOS A GOLDSTEIN JR. 1960 [id number]           Tertiary Coverage     Payor Plan Insurance Group Employer/Plan Group    [facility] " MEDICARE REPLACEMENT [employer] MEDICARE REPLACEMENT [id number]     Payor Plan Address Payor Plan Phone Number Payor Plan Fax Number Effective Dates    [address] [address] [phone]  1/1/2019 - None Entered    [facility] [address]       Subscriber Name Subscriber Birth Date Member ID       SHANNAN BOYER JR. 1960 [id number]                 Emergency Contacts      (Rel.) Home Phone Work Phone Mobile Phone    Daniela Boyer (Spouse) -- [phone]               Discharge Summary      Nick Carolina APRN at 04/19/23 0723     Attestation signed by Ranjit Carter DO at 04/19/23 1720    This visit was performed by both a physician and an APC. I personally evaluated and examined the patient. I performed all aspects of the MDM as documented.    Patient seen ambulating the hallways.  Looks well.  Feels better.  No chest pain.  No dyspnea.  Swelling improved.  Will discharge home today and continue plans and meds as below.  Follow-up with PCP and cardiology as an outpatient.      Electronically signed by Ranjit Carter DO, 4/19/2023, 17:19 CDT.                          [facility] Medicine Services  DISCHARGE SUMMARY       Date of Admission: 4/17/2023  Date of Discharge:  4/19/2023  Primary Care Physician: Vinayak Correia PA    Presenting Problem/History of Present Illness:  Chest tightness, shortness of breath, acute on chronic diastolic congestive heart failure    Final Discharge Diagnoses:  Active Hospital Problems    Diagnosis    • **Acute on chronic diastolic congestive heart failure    • Unstable angina pectoris    • Aneurysm of ascending aorta without rupture    • Chronic anticoagulation    • Bilateral pleural effusion    • Paroxysmal atrial fibrillation (HCC)    • Class 2 severe obesity due to excess calories with serious comorbidity and body mass index (BMI) of 35.0 to 35.9 in adult (HCC)    • Coronary artery disease of native artery of native heart  with stable angina pectoris    • Type 2 diabetes mellitus with circulatory disorder, with long-term current use of insulin (HCC)    • Seizure disorder        Consults: Cardiology     Procedures Performed: None    Pertinent Test Results:   Results for orders placed during the hospital encounter of 04/17/23    Adult Transthoracic Echo Complete W/ Cont if Necessary Per Protocol    Interpretation Summary  •  Left ventricular systolic function is normal. Left ventricular ejection fraction appears to be 61 - 65%.  •  The following left ventricular wall segments are very mildly hypokinetic: apical inferior, apical septal and apex hypokinetic.  •  Left ventricular diastolic function is consistent with (grade II w/high LAP) pseudonormalization.  •  Left atrial volume is mildly increased.  •  Estimated right ventricular systolic pressure from tricuspid regurgitation is normal (<35 mmHg).  •  Normal size and function of the right ventricle.  •  No significant valvular pathology.  •  No significant change compared to prior exam from 4/29/21.      Imaging Results (All)     Procedure Component Value Units Date/Time    CT Angiogram Chest [[id number]] Collected: 04/17/23 1337     Updated: 04/17/23 1404    Narrative:      EXAMINATION: CT ANGIOGRAM CHEST- 4/17/2023 1:37 PM CDT     HISTORY: Acute aortic syndrome (AAS) suspected. Chest pain and shortness  of breath.     DOSE: 439 mGycm (Automatic exposure control technique was implemented in  an effort to keep the radiation dose as low as possible without  compromising image quality)     REPORT:  Spiral CT of the chest was performed after administration of intravenous  contrast from the thoracic inlet through the upper abdomen using CTA  protocol, which includes reconstructed maximum intensity projection  (MIP)coronal and sagittal images.     Comparison: CTA chest 02/23/2023.     The contrast bolus is satisfactory, there is mild respiratory cardiac  motion artifact. There is mild  dilation of the central pulmonary  arteries as before, pulmonary arterial hypertension is likely. The main  pulmonary artery segment measures 4.1 cm in diameter. No filling defects  are seen in the pulmonary arteries. Heart size appears be normal and the  RV LV ratio is less than 1, normal. In cross-section, the ascending  aorta has a slightly ovoid shape, currently measuring approximately 4.5  x 4.2 cm in diameter, compared with 4.2 x 3.9 cm at the same level on  the previous scan. At the arch level, the aorta has a maximum diameter  3.1 cm, which is normal. The descending thoracic aorta has a maximum  diameter 2.9 cm which is normal. No aortic dissection or aneurysm  rupture is identified. There is previous CABG. Heavy calcification seen  within the coronary arteries, greatest at the LAD distribution and there  appear to be stents within the LAD. There is trace left pleural effusion  and a small right pleural effusion is present. Review of lung windows  demonstrates no evidence of consolidating pneumonia. There is mild  bronchial wall thickening of the lung zones, greater on the right. There  is subtle haziness at the bronchial branch points in the right lower  lobe, including a focal nodular infiltrates seen on images 90 through 93  of series 5.. There is a benign-appearing 4 mm subpleural nodule in the  lingula image 19 series 5. In the right lower lobe, there is a  noncalcified perifissural nodule that measures 4 x 10 mm, where before  there was only a 3 mm nodule in this location. This is compared to the  infectious or inflammatory but follow-up chest CT is recommended in 3  months. There is a subpleural nodule in the right lower lobe laterally  which is unchanged, image 113 series 5. This measures 5 mm. There is  mild subpleural scarring in the lateral aspect of the right middle lobe.  No pneumothorax is identified. The visualized upper abdomen is  unremarkable except for evidence previous cholecystectomy.  Review of  bone windows shows no acute abnormality.       Impression:      1. Mild bronchial wall thickening and small nodular infiltrate in the  right lower lobe without consolidation likely represents a mild degree  of bronchopneumonia. There is an associated 4 x 10 mm nodule in the  right lower lobe which has increased since this compared with the most  recent CT when it measured 3 mm. This is likely infectious or  inflammatory, repeat chest CT is recommended in 3 months however to  ensure resolution.  2. Mild aneurysmal dilation of the ascending aorta measuring 4.5 x 4.2  cm, compared with 4.2 x 3.9 cm on the previous CT, without evidence of  dissection or aneurysm rupture.  3. Dilation of the central pulmonary arteries compatible pulmonary  arterial hypertension.  4. Extensive coronary artery disease as before. There is previous median  sternotomy.  5. Trace left pleural effusion and a small right pleural effusion is  present.           This report was finalized on 04/17/2023 13:53 by Dr. Cyril Reyna MD.    XR Chest 1 View [[id number]] Collected: 04/17/23 1109     Updated: 04/17/23 1114    Narrative:      EXAMINATION: XR CHEST 1 VW- 4/17/2023 11:09 AM CDT     HISTORY: Chest Pain Triage Protocol     REPORT: A frontal view of the chest was obtained.     COMPARISON: Chest x-ray 10/20/2022.     There is volume loss in the lung bases, increased central and basilar  vascular congestion is noted in the heart is mildly enlarged. No focal  pulmonary infiltrate or consolidation is identified. There is previous  CABG. No pneumothorax or pleural effusion is seen. The osseous  structures and upper abdomen are unremarkable.       Impression:      Mild cardiomegaly and mild vascular congestion centrally  probably related to mild CHF.  This report was finalized on 04/17/2023 11:11 by Dr. Cyril Reyna MD.        LAB RESULTS:      Lab 04/19/23  0411 04/18/23  0340 04/17/23  1109   WBC 6.35 5.72 6.72   HEMOGLOBIN 13.4 13.4  13.5   HEMATOCRIT 42.7 42.1 41.3   PLATELETS 130* 112* 106*   NEUTROS ABS  --   --  4.83   LYMPHS ABS  --   --  1.09   MONOS ABS  --   --  0.58   EOS ABS  --   --  0.16   MCV 91.6 91.3 91.0   D DIMER QUANT  --   --  1.09*         Lab 04/19/23  0411 04/18/23  0340 04/17/23  1220   SODIUM 138 136 135*   POTASSIUM 4.4 4.1 4.3   CHLORIDE 101 99 100   CO2 24.0 26.0 23.0   ANION GAP 13.0 11.0 12.0   BUN 25* 17 12   CREATININE 1.37* 1.24 0.94   EGFR 57.2* 64.5 90.0   GLUCOSE 189* 303* 230*   CALCIUM 9.7 10.1 9.8   HEMOGLOBIN A1C  --  7.10*  --          Lab 04/19/23  0411 04/17/23  1220   TOTAL PROTEIN 6.9 7.6   ALBUMIN 4.3 4.6   GLOBULIN 2.6 3.0   ALT (SGPT) 17 18   AST (SGOT) 15 19   BILIRUBIN 0.9 1.3*   ALK PHOS 78 86         Lab 04/17/23  1817 04/17/23  1530 04/17/23  1220 04/17/23  1109   PROBNP  --   --   --  1,249.0*   HSTROP T 13 18* 13  --          Lab 04/18/23  0340   CHOLESTEROL 105   LDL CHOL 48   HDL CHOL 29*   TRIGLYCERIDES 163*             Brief Urine Lab Results  (Last result in the past 365 days)      Color   Clarity   Blood   Leuk Est   Nitrite   Protein   CREAT   Urine HCG        02/14/23 0948 Yellow   Clear   Large   Trace   Negative   30 mg/dL               Microbiology Results (last 10 days)     Procedure Component Value - Date/Time    Blood Culture - Blood, Hand, Right [[id number]]  (Normal) Collected: 04/17/23 1530    Lab Status: Preliminary result Specimen: Blood from Hand, Right Updated: 04/18/23 1546     Blood Culture No growth at 24 hours    Blood Culture - Blood, Arm, Left [[id number]]  (Normal) Collected: 04/17/23 1446    Lab Status: Preliminary result Specimen: Blood from Arm, Left Updated: 04/18/23 1546     Blood Culture No growth at 24 hours          Hospital Course:   Upon arrival:  Patient presented to [facility] emergency department on 4/17/2023 with complaints of chest pressure without nausea or diaphoresis.  Troponins were elevated but flat.  Tridil drip was originally utilized and  "discontinued thereafter.  Patient's other complaint was shortness of breath worsening over the last 7 to 10 days.  He also reported orthopnea.  He was admitted for inpatient monitoring and management with a cardiology consult.    Thereafter:  Acute on chronic diastolic CHF-  Patient evaluated by Dr. Ramey, cardiology 4/14/2023 with plans for echo with possible CardioMEMS placement 5/3/2023. Cardiology- Dr. Llamas and TEENA Andersen evaluated patient.  Patient diuresed with twice daily IV Lasix during hospitalization.  PTA jardiance, metoprolol, aldactone, entresto were also continued.  Upon my exam this morning, patient has lost about 10 pounds since arrival.  I believe he is euvolemic today.  His weight is 250 pounds.  He is on room air without chest pain, shortness of breath, palpitations.  Appropriate for discharge with close heart failure follow.  Per Dr. Llamas recommendations, will flex dose Lasix with weight-based protocol.    Paroxysmal A-fib-  Eliquis, metoprolol bid. Rate controlled.      Cx pain with CAD history-  Nitro gtt off. Cardiology evaluated, felt immediate risk for ACS was low. Cx pain free now. Troponin flat. ASA, imdur, crestor, lopressor. LDL 48     DM-  Hgb A1C 7.1 Levemir 110 units bid and high dose SSI.   Continue upon discharge.     Ascending aortic aneurysm - followed by      Blood culture x2 pending?  No signs of infection and laboratory results or upon physical exam.  Patient shows significant improvement during hospitalization with IV diuresis.  Creatinine and vital signs remained stable.  Recommend follow-up within 6 days, BMP at that time.        Physical Exam on Discharge:  /51 (BP Location: Left arm, Patient Position: Lying)   Pulse 57   Temp 98.4 °F (36.9 °C) (Oral)   Resp 18   Ht 182.9 cm (72\")   Wt 114 kg (252 lb)   SpO2 95%   BMI 34.18 kg/m²   Physical Exam  Vitals reviewed.   Constitutional:       Appearance: Normal appearance. He is obese.   HENT: "      Head: Normocephalic and atraumatic.      Mouth/Throat:      Mouth: Mucous membranes are moist.      Pharynx: Oropharynx is clear.   Eyes:      Extraocular Movements: Extraocular movements intact.      Conjunctiva/sclera: Conjunctivae normal.   Cardiovascular:      Rate and Rhythm: Normal rate and regular rhythm.      Heart sounds: Murmur heard.   Pulmonary:      Effort: Pulmonary effort is normal.     Breath sounds:  Faint rales that were auscultated yesterday are resolved.  Abdominal:      Palpations: Abdomen is soft.      Comments: Protuberant   Musculoskeletal:         General: Normal range of motion.      Cervical back: Normal range of motion and neck supple.      Right lower leg: No edema.      Left lower leg: No edema.   Skin:     General: Skin is warm and dry.  Scaly and dry, worse on back and legs. No significant pitting edema in lower extremities.   Patient states that this is baseline  Neurological:      General: No focal deficit present.      Mental Status: He is alert and oriented to person, place, and time.   Psychiatric:         Mood and Affect: Mood normal.         Behavior: Behavior normal.     Condition on Discharge: Stable    Discharge Disposition:Home or Self Care Discharge home    Discharge Medications:     Discharge Medications      New Medications      Instructions Start Date   furosemide 40 MG tablet  Commonly known as: Lasix   40 mg, Oral, Daily PRN      furosemide 40 MG tablet  Commonly known as: Lasix   40 mg, Oral, Daily PRN         Continue These Medications      Instructions Start Date   acetaminophen 325 MG tablet  Commonly known as: TYLENOL   325 mg, Oral, Every 6 Hours PRN      albuterol sulfate  (90 Base) MCG/ACT inhaler  Commonly known as: PROVENTIL HFA;VENTOLIN HFA;PROAIR HFA   2 puffs, Inhalation, Every 6 Hours PRN      apixaban 5 MG tablet tablet  Commonly known as: ELIQUIS   5 mg, Oral, 2 Times Daily      Aspirin 81 MG capsule   81 mg, Oral, Daily       carboxymethylcellulose 0.5 % solution  Commonly known as: REFRESH PLUS   1 drop, Both Eyes, 4 Times Daily PRN      empagliflozin 10 MG tablet tablet  Commonly known as: JARDIANCE   10 mg, Oral, Daily      Entresto  MG tablet  Generic drug: sacubitril-valsartan   1 tablet, Oral, 2 Times Daily      ezetimibe 10 MG tablet  Commonly known as: ZETIA   10 mg, Oral, Daily      fluticasone 50 MCG/ACT nasal spray  Commonly known as: FLONASE   2 sprays, Nasal, Daily PRN      gabapentin 300 MG capsule  Commonly known as: NEURONTIN   300 mg, Oral, Nightly      guaiFENesin 600 MG 12 hr tablet  Commonly known as: MUCINEX   1,200 mg, Oral, Daily PRN      insulin aspart 100 UNIT/ML solution pen-injector sc pen  Commonly known as: novoLOG FLEXPEN   44 Units, Subcutaneous, 3 Times Daily With Meals, Can use 5 to 6 more units if needed in early evening.      insulin detemir 100 UNIT/ML injection  Commonly known as: LEVEMIR   110 Units, Subcutaneous, 2 Times Daily      isosorbide mononitrate 120 MG 24 hr tablet  Commonly known as: IMDUR   120 mg, Oral, Daily      lamoTRIgine 200 MG tablet  Commonly known as: LaMICtal   200 mg, Oral, 2 Times Daily      levETIRAcetam 500 MG tablet  Commonly known as: KEPPRA   1,500 mg, Oral, Every Morning      levETIRAcetam 500 MG tablet  Commonly known as: KEPPRA   2,000 mg, Oral, Nightly      metFORMIN 1000 MG tablet  Commonly known as: GLUCOPHAGE   1,000 mg, Oral, 2 Times Daily With Meals      metoprolol tartrate 100 MG tablet  Commonly known as: LOPRESSOR   100 mg, Oral, 2 Times Daily, Told to take the DOS-2/27/23, w/ sip of water.      multivitamin tablet tablet  Commonly known as: THERAGRAN   1 tablet, Oral, Daily      nitroglycerin 0.4 MG SL tablet  Commonly known as: NITROSTAT   0.4 mg, Sublingual, Every 5 Minutes PRN, Take no more than 3 doses in 15 minutes.       Nurtec 75 MG tablet dispersible tablet  Generic drug: Rimegepant Sulfate   75 mg, Oral, As Needed      pantoprazole 40 MG EC  tablet  Commonly known as: PROTONIX   40 mg, Oral, 2 Times Daily      polycarbophil 625 MG tablet tablet   625 mg, Oral, Daily PRN      psyllium 58.6 % packet  Commonly known as: METAMUCIL   1 packet, Oral, Daily PRN      rosuvastatin 20 MG tablet  Commonly known as: CRESTOR   20 mg, Oral, Nightly      spironolactone 50 MG tablet  Commonly known as: ALDACTONE   50 mg, Oral, Daily      tamsulosin 0.4 MG capsule 24 hr capsule  Commonly known as: FLOMAX   2 capsules, Oral, Nightly           Discharge Diet:   Diet Instructions     Diet: Cardiac Diets; Low Sodium (2g)      Discharge Diet: Cardiac Diets    Cardiac Diet: Low Sodium (2g)    Texture: Regular Texture (IDDSI 7)    Fluid Consistency: Thin (IDDSI 0)    Diet: Cardiac Diets; Low Sodium (2g); Regular Texture (IDDSI 7); Thin (IDDSI 0)      Discharge Diet: Cardiac Diets    Cardiac Diet: Low Sodium (2g)    Texture: Regular Texture (IDDSI 7)    Fluid Consistency: Thin (IDDSI 0)          Activity at Discharge:   Activity Instructions     Measure Weight Daily      Instruct patient on daily weights          Follow-up Appointments:   Cardiology within 6 days  PCP in 1 week  Future Appointments   Date Time Provider Department Center   5/3/2023 11:00 AM PAD ECHO ROOM 3 [facility] PAD CARDI PAD   5/11/2023 11:15 AM Mayank Ibarra MD [facility] ENT PAD PAD   7/20/2023 10:45 AM Agnieszka Jeff APRN [facility] CD  PAD   7/26/2023  9:00 AM PAD US NIVAS CART 3  PAD NIVAS PAD   7/26/2023 10:00 AM Arabella Yanes APRN [facility] VS PAD PAD   8/22/2023  8:30 AM Flako Freeman APRN [facility] N PAD PAD   9/6/2023  8:10 AM DOMENIC GONZALEZ UROLOGY PAD [facility] U PAD PAD   9/13/2023  9:15 AM Asif Perez PA [facility] U PAD PAD   2/14/2024 10:00 AM Skye Olson APRN [facility] CTS  PAD PAD       Test Results Pending at Discharge: Blood culture x2, no growth at this time, follow to completion.    Electronically signed by TEENA Starr, 04/19/23, 07:45 CDT.    Time: 35 minutes.         Electronically  signed by Anival Carter, DO at 04/19/23 1720       Discharge Order (From admission, onward)     Start     Ordered    04/19/23 0733  Discharge patient  Once        Expected Discharge Date: 04/19/23    Discharge Disposition: Home or Self Care    Physician of Record for Attribution - Please select from Treatment Team: ANIVAL CARTER [[id number]]    Review needed by CMO to determine Physician of Record: No       Question Answer Comment   Physician of Record for Attribution - Please select from Treatment Team ANIVAL CARTER    Review needed by CMO to determine Physician of Record No        04/19/23 0741

## 2023-04-22 LAB
BACTERIA SPEC AEROBE CULT: NORMAL
BACTERIA SPEC AEROBE CULT: NORMAL

## 2023-04-24 ENCOUNTER — READMISSION MANAGEMENT (OUTPATIENT)
Dept: CALL CENTER | Facility: HOSPITAL | Age: 63
End: 2023-04-24
Payer: OTHER GOVERNMENT

## 2023-04-24 LAB
QT INTERVAL: 420 MS
QT INTERVAL: 426 MS
QTC INTERVAL: 450 MS
QTC INTERVAL: 456 MS

## 2023-04-24 NOTE — OUTREACH NOTE
CHF Week 1 Survey    Flowsheet Row Responses   [facility] patient discharged from? [facility]   Does the patient have one of the following disease processes/diagnoses(primary or secondary)? CHF   CHF Week 1 attempt successful? Yes   Call start time 1557   Call end time 1559   Discharge diagnosis CHF   Meds reviewed with patient/caregiver? Yes   Is the patient having any side effects they believe may be caused by any medication additions or changes? No   Does the patient have all medications ordered at discharge? Yes   Is the patient taking all medications as directed (includes completed medication regime)? Yes   Does the patient have a primary care provider?  Yes   Does the patient have an appointment with their PCP within 7 days of discharge? Yes   Has the patient kept scheduled appointments due by today? Yes   DME comments pt reports can't use CPAP, having Inspira implant soon.   Pulse Ox monitoring Intermittent   Pulse Ox device source Patient   O2 Sat comments 97%   O2 Sat: education provided Sat levels, When to seek care, Monitoring frequency   Did the patient receive a copy of their discharge instructions? Yes   Nursing interventions Reviewed instructions with patient   What is the patient's perception of their health status since discharge? Improving   Nursing interventions Nurse provided patient education   Is the patient/caregiver able to teach back the hierarchy of who to call/visit for symptoms/problems? PCP, Specialist, Home health nurse, Urgent Care, ED, 911 Yes   CHF Zone this Call Green Zone   Green Zone Patient reports doing well, No new or worsening shortness of breath, Physical activity level is normal for you   Green Zone Interventions Daily weight check, Meds as directed, Low sodium diet, Follow up visits planned    CHF Week 1 call completed? Yes   Call end time 1559          Melinda EDUARDO - Registered Nurse

## 2023-04-24 NOTE — PROGRESS NOTES
Chief Complaint  Congestive Heart Failure (D/C F/U)    Subjective          Carlos A Boyer Jr. presents to [facility] CARDIOLOGY [id number] for routine follow-up of hospital discharge on 4/19/2023 for acute on chronic diastolic congestive heart failure.  He has chronic diastolic congestive heart failure, coronary artery disease status post coronary artery stenting and CABG x2 (LIMA to LAD and SVG to OM) 7/6/2019, paroxysmal atrial fibrillation status post cardioversion on chronic anticoagulation, hypertension, hyperlipidemia, type 2 diabetes mellitus, peripheral vascular disease, cerebrovascular accident, obstructive sleep apnea and obesity.  He continues to complain of dyspnea with mild exertion.  He reports intermittent bilateral extremity edema. He reports occasional palpitations as well. Patient denies chest pain, dizziness, syncope, orthopnea, PND or decreased stamina.  Patient denies any signs of bleeding.    Coronary Artery Disease  Presents for follow-up visit. Symptoms include leg swelling, palpitations and shortness of breath. Pertinent negatives include no chest pain, chest pressure, chest tightness, dizziness, muscle weakness or weight gain. Risk factors include hyperlipidemia and obesity. Risk factors do not include hypertension. His past medical history is significant for CHF. The symptoms have been stable. Compliance with diet is variable. Compliance with exercise is variable. Compliance with medications is good.   Atrial Fibrillation  Presents for follow-up visit. Symptoms include palpitations and shortness of breath. Symptoms are negative for an AICD problem, bradycardia, chest pain, dizziness, hemodynamic instability, hypertension, hypotension, pacemaker problem, syncope, tachycardia and weakness. The symptoms have been stable. Past medical history includes atrial fibrillation, CAD, CHF and hyperlipidemia. There are no medication compliance problems.   Hypertension  This is a chronic  "problem. The current episode started more than 1 year ago. The problem is controlled. Associated symptoms include palpitations and shortness of breath. Pertinent negatives include no anxiety, blurred vision, chest pain, headaches, malaise/fatigue, neck pain, orthopnea, peripheral edema, PND or sweats. Risk factors for coronary artery disease include male gender, obesity, diabetes mellitus and dyslipidemia. Current antihypertension treatment includes ACE inhibitors, diuretics and direct vasodilators. The current treatment provides significant improvement. Hypertensive end-organ damage includes CAD/MI, CVA and PVD. Identifiable causes of hypertension include sleep apnea.   Hyperlipidemia  This is a chronic problem. The current episode started more than 1 year ago. Exacerbating diseases include diabetes and obesity. Associated symptoms include shortness of breath. Pertinent negatives include no chest pain. Current antihyperlipidemic treatment includes statins. Risk factors for coronary artery disease include male sex, obesity, hypertension, dyslipidemia and diabetes mellitus.   Congestive Heart Failure  Presents for follow-up visit. Associated symptoms include edema, palpitations and shortness of breath. Pertinent negatives include no abdominal pain, chest pain, chest pressure, claudication, fatigue, muscle weakness, near-syncope, nocturia, orthopnea, paroxysmal nocturnal dyspnea or unexpected weight change. The symptoms have been stable. His past medical history is significant for CAD. Compliance with total regimen is 51-75%. Compliance with diet is 51-75%. Compliance with exercise is 51-75%. Compliance with medications is %.      I have reviewed and confirmed the accuracy of the ROS  TEENA Buckner        Objective   Vital Signs:   /72   Pulse 57   Ht 182.9 cm (72\")   Wt 118 kg (260 lb)   SpO2 99%   BMI 35.26 kg/m²     Vitals and nursing note reviewed.   Constitutional:       General: Awake. "      Appearance: Normal and healthy appearance. Well-developed and not in distress. Obese.   Eyes:      General: Lids are normal.      Conjunctiva/sclera: Conjunctivae normal.      Pupils: Pupils are equal, round, and reactive to light.   HENT:      Head: Normocephalic and atraumatic.      Nose: Nose normal.   Neck:      Vascular: No JVR. JVD normal.   Pulmonary:      Effort: Pulmonary effort is normal.      Breath sounds: Examination of the right-upper field reveals decreased breath sounds. Examination of the left-upper field reveals decreased breath sounds. Examination of the right-middle field reveals decreased breath sounds. Examination of the left-middle field reveals decreased breath sounds. Examination of the right-lower field reveals decreased breath sounds. Examination of the left-lower field reveals decreased breath sounds. Decreased breath sounds present. No wheezing. No rhonchi. No rales.   Chest:      Chest wall: Not tender to palpatation.   Cardiovascular:      PMI at left midclavicular line. Bradycardia present. Regular rhythm. Normal S1. Normal S2.      Murmurs: There is a grade 2/6 harsh midsystolic murmur at the URSB, radiating to the neck. There is a grade 2/4 high frequency blowing decrescendo, early diastolic murmur at the URSB, radiating to the apex.      No gallop. No click. No rub.   Pulses:     Intact distal pulses.   Edema:     Peripheral edema absent.   Abdominal:      General: Bowel sounds are normal.      Palpations: Abdomen is soft.      Tenderness: There is no abdominal tenderness.   Musculoskeletal: Normal range of motion.         General: No tenderness.      Cervical back: Normal range of motion. Skin:     General: Skin is warm and dry.   Neurological:      General: No focal deficit present.      Mental Status: Alert, oriented to person, place, and time and oriented to person, place and time.   Psychiatric:         Attention and Perception: Attention and perception normal.          Mood and Affect: Mood and affect normal.         Speech: Speech normal.         Behavior: Behavior normal. Behavior is cooperative.         Thought Content: Thought content normal.         Cognition and Memory: Cognition and memory normal.         Judgment: Judgment normal.        Result Review :   The following data was reviewed by: TEENA Buckner on 4/25/2023:  Common labs        4/17/2023    11:09 4/17/2023    12:20 4/18/2023    03:40 4/19/2023    04:11   Common Labs   Glucose  230   303   189     BUN  12   17   25     Creatinine  0.94   1.24   1.37     Sodium  135   136   138     Potassium  4.3   4.1   4.4     Chloride  100   99   101     Calcium  9.8   10.1   9.7     Albumin  4.6    4.3     Total Bilirubin  1.3    0.9     Alkaline Phosphatase  86    78     AST (SGOT)  19    15     ALT (SGPT)  18    17     WBC 6.72    5.72   6.35     Hemoglobin 13.5    13.4   13.4     Hematocrit 41.3    42.1   42.7     Platelets 106    112   130     Total Cholesterol   105      Triglycerides   163      HDL Cholesterol   29      LDL Cholesterol    48      Hemoglobin A1C   7.10        Data reviewed: Cardiology studies 2d echo 4/29/21, Holter monitor 9/11/2022, and Sahra scan 9/21/2022         Assessment and Plan    Diagnoses and all orders for this visit:    1. Chronic diastolic congestive heart failure (HCC) (Primary)-NYHA class II.  Stage C. Compensated.  Reviewed signs and symptoms of CHF and what to report with the patient. Patient instructed to restrict sodium and weigh daily. Report weight gain of greater than 2 lbs overnight or 5 lbs in 1 week. Pt verbalized understanding of instructions and plan of care.  Continue Entresto, Jardiance, spironolactone and metoprolol tartrate.  Continue as needed Lasix. Pt to be scheduled for  CardioMEMs given recent admission for acute on chronic diastolic congestive heart failure.     2. Coronary artery disease involving native coronary artery of native heart with stable angina  pectoris (HCC)- Continue Imdur and Ranexa.     3. S/P CABG x 2-LIMA to LAD and SVG to OM 7/6/2019 per Dr. Steven Tang at [facility].  Patient continues on aspirin.  Denies bleeding.    4. Paroxysmal atrial fibrillation (HCC)- no recurrence on recent Holter monitor.  Anticoagulated.  Stable.  Continue metoprolol tartrate.     5. Chronic anticoagulation-continue Eliquis.  Patient denies bleeding.    6. Primary hypertension-blood pressure is well controlled. Continue spironolactone, metoprolol tartrate, amlodipine and Entresto.  Monitor and record daily blood pressure. Report readings consistently higher than 130/80 or consistently lower than 100/60.     7. Hyperlipidemia LDL goal <70-management per PCP.  Continue Crestor and Tricor.    8. Bilateral carotid artery stenosis-patient follows with Dr. Chetan Echavarria with vascular surgery.  Stable.    9. Type 2 diabetes mellitus with other circulatory complication, with long-term current use of insulin ([facility])-management per PCP.  Type 2 diabetes mellitus in the setting of obstructive coronary artery disease.  Continue Jardiance.    10. HOA on CPAP- pt has not been wearing his CPAP for approximately one year due to frequent sinus infections.  Patient has been referred to ENT for evaluation for potential inspire device and is scheduled to see Dr. Mayank Ibarra on 5/11/23.      11. Class 2 severe obesity due to excess calories with serious comorbidity and body mass index (BMI) of 35.0 to 35.9 in adult ([facility])- Patient's Body mass index is 35.26 kg/m². indicating that he is obese (BMI >30). Obesity-related health conditions include the following: obstructive sleep apnea, hypertension, coronary heart disease, diabetes mellitus, dyslipidemias and peripheral vascular disease. Obesity is unchanged. BMI is is above average; no BMI management plan is appropriate. We discussed low calorie, low carb based diet program, portion control and increasing exercise.    12. Thoracic  aortic aneurysm without rupture (HCC)-patient follows with Dr. Steven Tagn with CT surgery for surveillance of 4.4 cm thoracic aortic aneurysm.  Stable.      Follow Up   Return in about 4 weeks (around 5/23/2023) for Next scheduled follow up.  Patient was given instructions and counseling regarding his condition or for health maintenance advice. Please see specific information pulled into the AVS if appropriate.

## 2023-04-24 NOTE — H&P (VIEW-ONLY)
Chief Complaint  Congestive Heart Failure (D/C F/U)    Subjective          Carlos A Boyer Jr. presents to [facility] CARDIOLOGY [id number] for routine follow-up of hospital discharge on 4/19/2023 for acute on chronic diastolic congestive heart failure.  He has chronic diastolic congestive heart failure, coronary artery disease status post coronary artery stenting and CABG x2 (LIMA to LAD and SVG to OM) 7/6/2019, paroxysmal atrial fibrillation status post cardioversion on chronic anticoagulation, hypertension, hyperlipidemia, type 2 diabetes mellitus, peripheral vascular disease, cerebrovascular accident, obstructive sleep apnea and obesity.  He continues to complain of dyspnea with mild exertion.  He reports intermittent bilateral extremity edema. He reports occasional palpitations as well. Patient denies chest pain, dizziness, syncope, orthopnea, PND or decreased stamina.  Patient denies any signs of bleeding.    Coronary Artery Disease  Presents for follow-up visit. Symptoms include leg swelling, palpitations and shortness of breath. Pertinent negatives include no chest pain, chest pressure, chest tightness, dizziness, muscle weakness or weight gain. Risk factors include hyperlipidemia and obesity. Risk factors do not include hypertension. His past medical history is significant for CHF. The symptoms have been stable. Compliance with diet is variable. Compliance with exercise is variable. Compliance with medications is good.   Atrial Fibrillation  Presents for follow-up visit. Symptoms include palpitations and shortness of breath. Symptoms are negative for an AICD problem, bradycardia, chest pain, dizziness, hemodynamic instability, hypertension, hypotension, pacemaker problem, syncope, tachycardia and weakness. The symptoms have been stable. Past medical history includes atrial fibrillation, CAD, CHF and hyperlipidemia. There are no medication compliance problems.   Hypertension  This is a chronic  "problem. The current episode started more than 1 year ago. The problem is controlled. Associated symptoms include palpitations and shortness of breath. Pertinent negatives include no anxiety, blurred vision, chest pain, headaches, malaise/fatigue, neck pain, orthopnea, peripheral edema, PND or sweats. Risk factors for coronary artery disease include male gender, obesity, diabetes mellitus and dyslipidemia. Current antihypertension treatment includes ACE inhibitors, diuretics and direct vasodilators. The current treatment provides significant improvement. Hypertensive end-organ damage includes CAD/MI, CVA and PVD. Identifiable causes of hypertension include sleep apnea.   Hyperlipidemia  This is a chronic problem. The current episode started more than 1 year ago. Exacerbating diseases include diabetes and obesity. Associated symptoms include shortness of breath. Pertinent negatives include no chest pain. Current antihyperlipidemic treatment includes statins. Risk factors for coronary artery disease include male sex, obesity, hypertension, dyslipidemia and diabetes mellitus.   Congestive Heart Failure  Presents for follow-up visit. Associated symptoms include edema, palpitations and shortness of breath. Pertinent negatives include no abdominal pain, chest pain, chest pressure, claudication, fatigue, muscle weakness, near-syncope, nocturia, orthopnea, paroxysmal nocturnal dyspnea or unexpected weight change. The symptoms have been stable. His past medical history is significant for CAD. Compliance with total regimen is 51-75%. Compliance with diet is 51-75%. Compliance with exercise is 51-75%. Compliance with medications is %.      I have reviewed and confirmed the accuracy of the ROS  TEENA Buckner        Objective   Vital Signs:   /72   Pulse 57   Ht 182.9 cm (72\")   Wt 118 kg (260 lb)   SpO2 99%   BMI 35.26 kg/m²     Vitals and nursing note reviewed.   Constitutional:       General: Awake. "      Appearance: Normal and healthy appearance. Well-developed and not in distress. Obese.   Eyes:      General: Lids are normal.      Conjunctiva/sclera: Conjunctivae normal.      Pupils: Pupils are equal, round, and reactive to light.   HENT:      Head: Normocephalic and atraumatic.      Nose: Nose normal.   Neck:      Vascular: No JVR. JVD normal.   Pulmonary:      Effort: Pulmonary effort is normal.      Breath sounds: Examination of the right-upper field reveals decreased breath sounds. Examination of the left-upper field reveals decreased breath sounds. Examination of the right-middle field reveals decreased breath sounds. Examination of the left-middle field reveals decreased breath sounds. Examination of the right-lower field reveals decreased breath sounds. Examination of the left-lower field reveals decreased breath sounds. Decreased breath sounds present. No wheezing. No rhonchi. No rales.   Chest:      Chest wall: Not tender to palpatation.   Cardiovascular:      PMI at left midclavicular line. Bradycardia present. Regular rhythm. Normal S1. Normal S2.      Murmurs: There is a grade 2/6 harsh midsystolic murmur at the URSB, radiating to the neck. There is a grade 2/4 high frequency blowing decrescendo, early diastolic murmur at the URSB, radiating to the apex.      No gallop. No click. No rub.   Pulses:     Intact distal pulses.   Edema:     Peripheral edema absent.   Abdominal:      General: Bowel sounds are normal.      Palpations: Abdomen is soft.      Tenderness: There is no abdominal tenderness.   Musculoskeletal: Normal range of motion.         General: No tenderness.      Cervical back: Normal range of motion. Skin:     General: Skin is warm and dry.   Neurological:      General: No focal deficit present.      Mental Status: Alert, oriented to person, place, and time and oriented to person, place and time.   Psychiatric:         Attention and Perception: Attention and perception normal.          Mood and Affect: Mood and affect normal.         Speech: Speech normal.         Behavior: Behavior normal. Behavior is cooperative.         Thought Content: Thought content normal.         Cognition and Memory: Cognition and memory normal.         Judgment: Judgment normal.        Result Review :   The following data was reviewed by: TEENA Buckner on 4/25/2023:  Common labs        4/17/2023    11:09 4/17/2023    12:20 4/18/2023    03:40 4/19/2023    04:11   Common Labs   Glucose  230   303   189     BUN  12   17   25     Creatinine  0.94   1.24   1.37     Sodium  135   136   138     Potassium  4.3   4.1   4.4     Chloride  100   99   101     Calcium  9.8   10.1   9.7     Albumin  4.6    4.3     Total Bilirubin  1.3    0.9     Alkaline Phosphatase  86    78     AST (SGOT)  19    15     ALT (SGPT)  18    17     WBC 6.72    5.72   6.35     Hemoglobin 13.5    13.4   13.4     Hematocrit 41.3    42.1   42.7     Platelets 106    112   130     Total Cholesterol   105      Triglycerides   163      HDL Cholesterol   29      LDL Cholesterol    48      Hemoglobin A1C   7.10        Data reviewed: Cardiology studies 2d echo 4/29/21, Holter monitor 9/11/2022, and Sahra scan 9/21/2022         Assessment and Plan    Diagnoses and all orders for this visit:    1. Chronic diastolic congestive heart failure (HCC) (Primary)-NYHA class II.  Stage C. Compensated.  Reviewed signs and symptoms of CHF and what to report with the patient. Patient instructed to restrict sodium and weigh daily. Report weight gain of greater than 2 lbs overnight or 5 lbs in 1 week. Pt verbalized understanding of instructions and plan of care.  Continue Entresto, Jardiance, spironolactone and metoprolol tartrate.  Continue as needed Lasix. Pt to be scheduled for  CardioMEMs given recent admission for acute on chronic diastolic congestive heart failure.     2. Coronary artery disease involving native coronary artery of native heart with stable angina  pectoris (HCC)- Continue Imdur and Ranexa.     3. S/P CABG x 2-LIMA to LAD and SVG to OM 7/6/2019 per Dr. Steven Tang at [facility].  Patient continues on aspirin.  Denies bleeding.    4. Paroxysmal atrial fibrillation (HCC)- no recurrence on recent Holter monitor.  Anticoagulated.  Stable.  Continue metoprolol tartrate.     5. Chronic anticoagulation-continue Eliquis.  Patient denies bleeding.    6. Primary hypertension-blood pressure is well controlled. Continue spironolactone, metoprolol tartrate, amlodipine and Entresto.  Monitor and record daily blood pressure. Report readings consistently higher than 130/80 or consistently lower than 100/60.     7. Hyperlipidemia LDL goal <70-management per PCP.  Continue Crestor and Tricor.    8. Bilateral carotid artery stenosis-patient follows with Dr. Chetan Echavarria with vascular surgery.  Stable.    9. Type 2 diabetes mellitus with other circulatory complication, with long-term current use of insulin ([facility])-management per PCP.  Type 2 diabetes mellitus in the setting of obstructive coronary artery disease.  Continue Jardiance.    10. HOA on CPAP- pt has not been wearing his CPAP for approximately one year due to frequent sinus infections.  Patient has been referred to ENT for evaluation for potential inspire device and is scheduled to see Dr. Mayank Ibarra on 5/11/23.      11. Class 2 severe obesity due to excess calories with serious comorbidity and body mass index (BMI) of 35.0 to 35.9 in adult ([facility])- Patient's Body mass index is 35.26 kg/m². indicating that he is obese (BMI >30). Obesity-related health conditions include the following: obstructive sleep apnea, hypertension, coronary heart disease, diabetes mellitus, dyslipidemias and peripheral vascular disease. Obesity is unchanged. BMI is is above average; no BMI management plan is appropriate. We discussed low calorie, low carb based diet program, portion control and increasing exercise.    12. Thoracic  aortic aneurysm without rupture (HCC)-patient follows with Dr. Steven Tang with CT surgery for surveillance of 4.4 cm thoracic aortic aneurysm.  Stable.      Follow Up   Return in about 4 weeks (around 5/23/2023) for Next scheduled follow up.  Patient was given instructions and counseling regarding his condition or for health maintenance advice. Please see specific information pulled into the AVS if appropriate.

## 2023-04-25 ENCOUNTER — OFFICE VISIT (OUTPATIENT)
Dept: CARDIOLOGY | Facility: CLINIC | Age: 63
End: 2023-04-25
Payer: OTHER GOVERNMENT

## 2023-04-25 VITALS
HEIGHT: 72 IN | WEIGHT: 260 LBS | OXYGEN SATURATION: 99 % | BODY MASS INDEX: 35.21 KG/M2 | HEART RATE: 57 BPM | DIASTOLIC BLOOD PRESSURE: 72 MMHG | SYSTOLIC BLOOD PRESSURE: 124 MMHG

## 2023-04-25 DIAGNOSIS — I65.23 BILATERAL CAROTID ARTERY STENOSIS: Chronic | ICD-10-CM

## 2023-04-25 DIAGNOSIS — I48.0 PAROXYSMAL ATRIAL FIBRILLATION: Chronic | ICD-10-CM

## 2023-04-25 DIAGNOSIS — Z95.1 S/P CABG X 2: Chronic | ICD-10-CM

## 2023-04-25 DIAGNOSIS — Z79.4 TYPE 2 DIABETES MELLITUS WITH OTHER CIRCULATORY COMPLICATION, WITH LONG-TERM CURRENT USE OF INSULIN: Chronic | ICD-10-CM

## 2023-04-25 DIAGNOSIS — G47.33 OSA ON CPAP: Chronic | ICD-10-CM

## 2023-04-25 DIAGNOSIS — E11.59 TYPE 2 DIABETES MELLITUS WITH OTHER CIRCULATORY COMPLICATION, WITH LONG-TERM CURRENT USE OF INSULIN: Chronic | ICD-10-CM

## 2023-04-25 DIAGNOSIS — Z99.89 OSA ON CPAP: Chronic | ICD-10-CM

## 2023-04-25 DIAGNOSIS — Z79.01 CHRONIC ANTICOAGULATION: Chronic | ICD-10-CM

## 2023-04-25 DIAGNOSIS — E66.09 CLASS 1 OBESITY DUE TO EXCESS CALORIES WITH SERIOUS COMORBIDITY AND BODY MASS INDEX (BMI) OF 34.0 TO 34.9 IN ADULT: ICD-10-CM

## 2023-04-25 DIAGNOSIS — I71.20 THORACIC AORTIC ANEURYSM WITHOUT RUPTURE, UNSPECIFIED PART: ICD-10-CM

## 2023-04-25 DIAGNOSIS — I10 PRIMARY HYPERTENSION: Chronic | ICD-10-CM

## 2023-04-25 DIAGNOSIS — I50.32 CHRONIC DIASTOLIC CONGESTIVE HEART FAILURE: Primary | ICD-10-CM

## 2023-04-25 DIAGNOSIS — E78.5 HYPERLIPIDEMIA LDL GOAL <70: Chronic | ICD-10-CM

## 2023-04-25 DIAGNOSIS — I25.118 CORONARY ARTERY DISEASE OF NATIVE ARTERY OF NATIVE HEART WITH STABLE ANGINA PECTORIS: ICD-10-CM

## 2023-05-02 ENCOUNTER — READMISSION MANAGEMENT (OUTPATIENT)
Dept: CALL CENTER | Facility: HOSPITAL | Age: 63
End: 2023-05-02
Payer: OTHER GOVERNMENT

## 2023-05-02 NOTE — OUTREACH NOTE
CHF Week 2 Survey    Flowsheet Row Responses   [facility] facility patient discharged from? [place]   Does the patient have one of the following disease processes/diagnoses(primary or secondary)? CHF   Week 2 attempt successful? No   Unsuccessful attempts Attempt 1          Mayank FINK - Registered Nurse

## 2023-05-03 DIAGNOSIS — R91.1 LUNG NODULE: Primary | ICD-10-CM

## 2023-05-05 ENCOUNTER — READMISSION MANAGEMENT (OUTPATIENT)
Dept: CALL CENTER | Facility: HOSPITAL | Age: 63
End: 2023-05-05
Payer: OTHER GOVERNMENT

## 2023-05-05 NOTE — OUTREACH NOTE
CHF Week 2 Survey    Flowsheet Row Responses   [facility] facility patient discharged from? [place]   Does the patient have one of the following disease processes/diagnoses(primary or secondary)? CHF   Week 2 attempt successful? No   Unsuccessful attempts Attempt 2          Skye STRICKLAND - Registered Nurse

## 2023-05-08 ENCOUNTER — HOSPITAL ENCOUNTER (OUTPATIENT)
Facility: HOSPITAL | Age: 63
Setting detail: HOSPITAL OUTPATIENT SURGERY
Discharge: HOME | End: 2023-05-08
Attending: INTERNAL MEDICINE | Admitting: INTERNAL MEDICINE
Payer: OTHER GOVERNMENT

## 2023-05-08 VITALS
RESPIRATION RATE: 14 BRPM | HEIGHT: 72 IN | WEIGHT: 267 LBS | TEMPERATURE: 97.7 F | BODY MASS INDEX: 36.16 KG/M2 | DIASTOLIC BLOOD PRESSURE: 64 MMHG | SYSTOLIC BLOOD PRESSURE: 127 MMHG | OXYGEN SATURATION: 93 % | HEART RATE: 51 BPM

## 2023-05-08 DIAGNOSIS — I50.32 CHRONIC DIASTOLIC CONGESTIVE HEART FAILURE: ICD-10-CM

## 2023-05-08 DIAGNOSIS — I50.32 CHRONIC DIASTOLIC CONGESTIVE HEART FAILURE: Chronic | ICD-10-CM

## 2023-05-08 LAB
ALBUMIN SERPL-MCNC: 4.3 G/DL (ref 3.5–5.2)
ALBUMIN/GLOB SERPL: 1.7 G/DL
ALP SERPL-CCNC: 72 U/L (ref 39–117)
ALT SERPL W P-5'-P-CCNC: 23 U/L (ref 1–41)
ANION GAP SERPL CALCULATED.3IONS-SCNC: 10 MMOL/L (ref 5–15)
AST SERPL-CCNC: 18 U/L (ref 1–40)
ATMOSPHERIC PRESS: 747 MMHG
BASE EXCESS BLDV CALC-SCNC: 1.9 MMOL/L (ref 0–2)
BASOPHILS # BLD AUTO: 0.02 10*3/MM3 (ref 0–0.2)
BASOPHILS NFR BLD AUTO: 0.3 % (ref 0–1.5)
BDY SITE: ABNORMAL
BILIRUB SERPL-MCNC: 1 MG/DL (ref 0–1.2)
BODY TEMPERATURE: 37 C
BUN SERPL-MCNC: 14 MG/DL (ref 8–23)
BUN/CREAT SERPL: 15.9 (ref 7–25)
CALCIUM SPEC-SCNC: 9.1 MG/DL (ref 8.6–10.5)
CHLORIDE SERPL-SCNC: 106 MMOL/L (ref 98–107)
CO2 SERPL-SCNC: 25 MMOL/L (ref 22–29)
COHGB MFR BLD: 0.7 % (ref 0–5)
CREAT SERPL-MCNC: 0.88 MG/DL (ref 0.76–1.27)
DEPRECATED RDW RBC AUTO: 45 FL (ref 37–54)
EGFRCR SERPLBLD CKD-EPI 2021: 95.4 ML/MIN/1.73
EOSINOPHIL # BLD AUTO: 0.14 10*3/MM3 (ref 0–0.4)
EOSINOPHIL NFR BLD AUTO: 2.3 % (ref 0.3–6.2)
ERYTHROCYTE [DISTWIDTH] IN BLOOD BY AUTOMATED COUNT: 13.3 % (ref 12.3–15.4)
GAS FLOW AIRWAY: 2 LPM
GLOBULIN UR ELPH-MCNC: 2.5 GM/DL
GLUCOSE SERPL-MCNC: 112 MG/DL (ref 65–99)
HCO3 BLDV-SCNC: 28.2 MMOL/L (ref 22–28)
HCT VFR BLD AUTO: 39.1 % (ref 37.5–51)
HGB BLD-MCNC: 12.2 G/DL (ref 13–17.7)
HGB BLDA-MCNC: 12.4 G/DL (ref 14–18)
INR PPP: NORMAL
LYMPHOCYTES # BLD AUTO: 1.58 10*3/MM3 (ref 0.7–3.1)
LYMPHOCYTES NFR BLD AUTO: 26.2 % (ref 19.6–45.3)
Lab: ABNORMAL
MCH RBC QN AUTO: 29.2 PG (ref 26.6–33)
MCHC RBC AUTO-ENTMCNC: 31.2 G/DL (ref 31.5–35.7)
MCV RBC AUTO: 93.5 FL (ref 79–97)
METHGB BLD QL: 1 % (ref 0–3)
MODALITY: ABNORMAL
MONOCYTES # BLD AUTO: 0.55 10*3/MM3 (ref 0.1–0.9)
MONOCYTES NFR BLD AUTO: 9.1 % (ref 5–12)
NEUTROPHILS NFR BLD AUTO: 3.71 10*3/MM3 (ref 1.7–7)
NEUTROPHILS NFR BLD AUTO: 61.6 % (ref 42.7–76)
NOTE: ABNORMAL
OXYHGB MFR BLDV: 64 % (ref 60–85)
PCO2 BLDV: 50.2 MM HG (ref 41–51)
PH BLDV: 7.36 PH UNITS (ref 7.32–7.42)
PLATELET # BLD AUTO: 107 10*3/MM3 (ref 140–450)
PMV BLD AUTO: 11.4 FL (ref 6–12)
PO2 BLDV: 35.8 MM HG (ref 27–53)
POTASSIUM BLDV-SCNC: 3.9 MMOL/L (ref 3.5–5.2)
POTASSIUM SERPL-SCNC: 4.5 MMOL/L (ref 3.5–5.2)
PROT SERPL-MCNC: 6.8 G/DL (ref 6–8.5)
PROTHROMBIN TIME: NORMAL S
RBC # BLD AUTO: 4.18 10*6/MM3 (ref 4.14–5.8)
SAO2 % BLDCOV: 65.1 % (ref 45–75)
SODIUM BLDV-SCNC: 143 MMOL/L (ref 136–145)
SODIUM SERPL-SCNC: 141 MMOL/L (ref 136–145)
VENTILATOR MODE: ABNORMAL
WBC NRBC COR # BLD: 6.03 10*3/MM3 (ref 3.4–10.8)

## 2023-05-08 PROCEDURE — C1751 CATH, INF, PER/CENT/MIDLINE: HCPCS | Performed by: INTERNAL MEDICINE

## 2023-05-08 PROCEDURE — 25010000002 HEPARIN (PORCINE) 2000-0.9 UNIT/L-% SOLUTION: Performed by: INTERNAL MEDICINE

## 2023-05-08 PROCEDURE — 80053 COMPREHEN METABOLIC PANEL: CPT | Performed by: INTERNAL MEDICINE

## 2023-05-08 PROCEDURE — C1894 INTRO/SHEATH, NON-LASER: HCPCS | Performed by: INTERNAL MEDICINE

## 2023-05-08 PROCEDURE — 99153 MOD SED SAME PHYS/QHP EA: CPT | Performed by: INTERNAL MEDICINE

## 2023-05-08 PROCEDURE — 99152 MOD SED SAME PHYS/QHP 5/>YRS: CPT | Performed by: INTERNAL MEDICINE

## 2023-05-08 PROCEDURE — C1769 GUIDE WIRE: HCPCS | Performed by: INTERNAL MEDICINE

## 2023-05-08 PROCEDURE — 25010000002 FENTANYL CITRATE (PF) 50 MCG/ML SOLUTION: Performed by: INTERNAL MEDICINE

## 2023-05-08 PROCEDURE — 25010000002 MIDAZOLAM PER 1 MG: Performed by: INTERNAL MEDICINE

## 2023-05-08 PROCEDURE — 85025 COMPLETE CBC W/AUTO DIFF WBC: CPT | Performed by: INTERNAL MEDICINE

## 2023-05-08 PROCEDURE — 33289 TCAT IMPL WRLS P-ART PRS SNR: CPT | Performed by: INTERNAL MEDICINE

## 2023-05-08 PROCEDURE — 25010000002 HEPARIN (PORCINE) 1000-0.9 UT/500ML-% SOLUTION: Performed by: INTERNAL MEDICINE

## 2023-05-08 PROCEDURE — 85610 PROTHROMBIN TIME: CPT | Performed by: INTERNAL MEDICINE

## 2023-05-08 PROCEDURE — 82805 BLOOD GASES W/O2 SATURATION: CPT

## 2023-05-08 PROCEDURE — 25510000001 IOPAMIDOL PER 1 ML: Performed by: INTERNAL MEDICINE

## 2023-05-08 PROCEDURE — 25010000002 FUROSEMIDE PER 20 MG

## 2023-05-08 PROCEDURE — C2624 WIRELESS PRESSURE SENSOR: HCPCS | Performed by: INTERNAL MEDICINE

## 2023-05-08 PROCEDURE — 82820 HEMOGLOBIN-OXYGEN AFFINITY: CPT

## 2023-05-08 DEVICE — SYS SENSR/PULM/ART W/DS CARDIOMEMS: Type: IMPLANTABLE DEVICE | Site: HEART | Status: FUNCTIONAL

## 2023-05-08 RX ORDER — LIDOCAINE HYDROCHLORIDE 20 MG/ML
INJECTION, SOLUTION INFILTRATION; PERINEURAL
Status: DISCONTINUED | OUTPATIENT
Start: 2023-05-08 | End: 2023-05-08 | Stop reason: HOSPADM

## 2023-05-08 RX ORDER — FUROSEMIDE 10 MG/ML
INJECTION INTRAMUSCULAR; INTRAVENOUS
Status: COMPLETED
Start: 2023-05-08 | End: 2023-05-08

## 2023-05-08 RX ORDER — SODIUM CHLORIDE 9 MG/ML
40 INJECTION, SOLUTION INTRAVENOUS AS NEEDED
Status: CANCELLED | OUTPATIENT
Start: 2023-05-08

## 2023-05-08 RX ORDER — SODIUM CHLORIDE 0.9 % (FLUSH) 0.9 %
10 SYRINGE (ML) INJECTION AS NEEDED
Status: CANCELLED | OUTPATIENT
Start: 2023-05-08

## 2023-05-08 RX ORDER — CLOPIDOGREL BISULFATE 75 MG/1
75 TABLET ORAL DAILY
Qty: 30 TABLET | Refills: 0 | Status: SHIPPED | OUTPATIENT
Start: 2023-05-08

## 2023-05-08 RX ORDER — SODIUM CHLORIDE 0.9 % (FLUSH) 0.9 %
3 SYRINGE (ML) INJECTION EVERY 12 HOURS SCHEDULED
Status: DISCONTINUED | OUTPATIENT
Start: 2023-05-08 | End: 2023-05-08 | Stop reason: HOSPADM

## 2023-05-08 RX ORDER — CLOPIDOGREL BISULFATE 75 MG/1
300 TABLET ORAL ONCE
Status: COMPLETED | OUTPATIENT
Start: 2023-05-08 | End: 2023-05-08

## 2023-05-08 RX ORDER — SODIUM CHLORIDE 9 MG/ML
50 INJECTION, SOLUTION INTRAVENOUS CONTINUOUS
Status: CANCELLED | OUTPATIENT
Start: 2023-05-08 | End: 2023-05-08

## 2023-05-08 RX ORDER — SODIUM CHLORIDE 9 MG/ML
50 INJECTION, SOLUTION INTRAVENOUS CONTINUOUS
Status: DISPENSED | OUTPATIENT
Start: 2023-05-08 | End: 2023-05-08

## 2023-05-08 RX ORDER — CLOPIDOGREL BISULFATE 75 MG/1
TABLET ORAL
Status: COMPLETED
Start: 2023-05-08 | End: 2023-05-08

## 2023-05-08 RX ORDER — SODIUM CHLORIDE 0.9 % (FLUSH) 0.9 %
10 SYRINGE (ML) INJECTION AS NEEDED
Status: DISCONTINUED | OUTPATIENT
Start: 2023-05-08 | End: 2023-05-08 | Stop reason: HOSPADM

## 2023-05-08 RX ORDER — FENTANYL CITRATE 50 UG/ML
INJECTION, SOLUTION INTRAMUSCULAR; INTRAVENOUS
Status: DISCONTINUED | OUTPATIENT
Start: 2023-05-08 | End: 2023-05-08 | Stop reason: HOSPADM

## 2023-05-08 RX ORDER — ACETAMINOPHEN 325 MG/1
650 TABLET ORAL EVERY 4 HOURS PRN
Status: CANCELLED | OUTPATIENT
Start: 2023-05-08

## 2023-05-08 RX ORDER — HEPARIN SODIUM 200 [USP'U]/100ML
INJECTION, SOLUTION INTRAVENOUS
Status: DISCONTINUED | OUTPATIENT
Start: 2023-05-08 | End: 2023-05-08 | Stop reason: HOSPADM

## 2023-05-08 RX ORDER — FUROSEMIDE 10 MG/ML
40 INJECTION INTRAMUSCULAR; INTRAVENOUS ONCE
Status: COMPLETED | OUTPATIENT
Start: 2023-05-08 | End: 2023-05-08

## 2023-05-08 RX ORDER — MIDAZOLAM HYDROCHLORIDE 1 MG/ML
INJECTION INTRAMUSCULAR; INTRAVENOUS
Status: DISCONTINUED | OUTPATIENT
Start: 2023-05-08 | End: 2023-05-08 | Stop reason: HOSPADM

## 2023-05-08 RX ORDER — SODIUM CHLORIDE 0.9 % (FLUSH) 0.9 %
10 SYRINGE (ML) INJECTION EVERY 12 HOURS SCHEDULED
Status: CANCELLED | OUTPATIENT
Start: 2023-05-08

## 2023-05-08 RX ADMIN — CLOPIDOGREL BISULFATE 300 MG: 75 TABLET ORAL at 15:24

## 2023-05-08 RX ADMIN — SODIUM CHLORIDE 50 ML/HR: 9 INJECTION, SOLUTION INTRAVENOUS at 10:51

## 2023-05-08 RX ADMIN — FUROSEMIDE 40 MG: 10 INJECTION INTRAMUSCULAR; INTRAVENOUS at 10:51

## 2023-05-08 RX ADMIN — FUROSEMIDE 40 MG: 10 INJECTION, SOLUTION INTRAVENOUS at 10:51

## 2023-05-08 NOTE — Clinical Note
Administered 2mg versed, no waste  Does not reflect appropriately in omnicell  Witnessed by richar klein rn

## 2023-05-10 ENCOUNTER — TELEPHONE (OUTPATIENT)
Dept: CARDIOLOGY | Facility: CLINIC | Age: 63
End: 2023-05-10
Payer: OTHER GOVERNMENT

## 2023-05-10 NOTE — TELEPHONE ENCOUNTER
Goal: 16 mmHg    05/10/23: 31 mmHg  05/09/23: 28 mmHg  05/08/23: 14 mmHg    This patient was implanted 5/8/23 with 14 mmHg as his first PA Diastolic reading. As of 5/9/23 and today his numbers have climbed to 28 mmHg and 31 mmHg.    Holly with [facility] is concerned with possible oversedation, or a need to adjust this patients target goal PA diastolic pressure.     MA unable to reach patient at this time, will try again to gather any S&S or instruct patient with medication intervention upon your request.

## 2023-05-10 NOTE — PROGRESS NOTES
YOB: 1960  Location: [facility]  Location Address: [address] [place] [address], [facility], [address] [place] [address]  Location Phone: [phone]    Chief Complaint   Patient presents with   • Sleep Apnea       History of Present Illness  Carlos A Boyer Jr. is a 63 y.o. male.  Carlos A Boyer Jr. is here for follow up of ENT complaints. The patient has had problems with sleep apnea, snoring, daytime somnolence and ongoing fatigue   He has tried cpap multiple times without success and has tried various masks and settings   Patient is interested in inspire procedure. He is using cpap 2 - 3 hours per night and has difficulty with tolerance  He is currently on eliquis and aspirin daily     Patient underwent procedure for pa pressure sensor implant 2023 and has a history of atrial flutter as well as chronic diastolic congestive heart failure     3/21/23 Sleep study AHI 14.3 35.4 16.5  EPWORTH: 15  BMI 35.59           Past Medical History:   Diagnosis Date   • Arthritis    • Asthma    • Cancer    • Carotid disease, bilateral    • Chest pain    • Chronic diastolic congestive heart failure 2019   • Chronic sinusitis    • Maribell bullosa    • Coronary artery disease involving native coronary artery of native heart with unstable angina pectoris 2017   • Deviated septum    • Diabetes mellitus    • Difficulty urinating    • Diverticulitis    • Enlarged prostate    • Fatty liver    • GERD (gastroesophageal reflux disease)    • [place] (hard of hearing)     Does have hearing aids   • Hyperlipidemia LDL goal <70 2017   • Hypertrophy of nasal turbinates    • Keratoderma    • Kidney stone    • Migraine    • Murmur, heart    • Myocardial infarction    • Obesity    • Paroxysmal atrial fibrillation 2019   • Personal history of COVID-19 2021   • PONV (postoperative nausea and vomiting)    • Primary hypertension 10/16/2016   • Psoriasis    • Seizures    • Sinus congestion    • Skin cancer    • Sleep apnea      not using cpap   • SOB (shortness of breath)    • Stroke    • UTI (urinary tract infection)        Past Surgical History:   Procedure Laterality Date   • CARDIAC CATHETERIZATION  01/2016    Dr. Broadbent; widely patent previously placed stents in the left anterior descending and obstructive disease involving the diagonal branch which was treated medically   • CARDIAC CATHETERIZATION N/A 07/14/2017    Procedure: Left Heart Cath;  Surgeon: Wade Ramey MD;  Location:  [facility];  Service:    • CARDIAC CATHETERIZATION Left 10/15/2018    Procedure: Cardiac Catheterization/Vascular Study;  Surgeon: Wade Ramey MD;  Location:  [facility];  Service: Cardiology   • CARDIAC CATHETERIZATION  10/15/2018    Procedure: Functional Flow [place];  Surgeon: Wade Ramey MD;  Location:  [facility];  Service: Cardiology   • CARDIAC CATHETERIZATION N/A 10/15/2018    Procedure: Left ventriculography;  Surgeon: Wade Ramey MD;  Location:  [facility];  Service: Cardiology   • CARDIAC CATHETERIZATION Left 06/26/2019    Procedure: Cardiac Catheterization/Vascular Study VEL OK  HE WILL WAIT 1 YEAR FOR SHOULDER SURGERY ;  Surgeon: Wade Ramey MD;  Location:  [facility];  Service: Cardiology   • CARDIAC CATHETERIZATION Left 04/30/2021    Procedure: Coronary angiography;  Surgeon: Sahil Llamas MD;  Location:  [facility];  Service: Cardiology;  Laterality: Left;   • CARDIAC CATHETERIZATION N/A 04/30/2021    Procedure: Percutaneous Coronary Intervention;  Surgeon: Sahil Llamas MD;  Location:  [facility];  Service: Cardiology;  Laterality: N/A;   • CARDIAC CATHETERIZATION N/A 11/09/2022    Procedure: Left Heart Cath with SVGs;  Surgeon: Wade Ramey MD;  Location:  [facility];  Service: Cardiology;  Laterality: N/A;   • CHOLECYSTECTOMY     • CHOLECYSTECTOMY WITH INTRAOPERATIVE  CHOLANGIOGRAM N/A 08/01/2018    Procedure: CHOLECYSTECTOMY LAPAROSCOPIC INTRAOPERATIVE CHOLANGIOGRAM;  Surgeon: Shane Ann MD;  Location:  [facility] OR;  Service: General   • COLONOSCOPY N/A 07/14/2020    Procedure: COLONOSCOPY WITH ANESTHESIA;  Surgeon: Anupam Morales DO;  Location: [facility];  Service: Gastroenterology;  Laterality: N/A;  pre: abdominal pain  post: diverticulosis  Vinayak Correia PA   • CORONARY ANGIOPLASTY     • CORONARY ARTERY BYPASS GRAFT N/A 07/06/2019    Procedure: CABG X2 WITH LIMA, LEFT LEG OVH, AND PLACEMENT OF LEFT FEMORAL ARTERIAL LINE;  Surgeon: Steven Tang MD;  Location:  [facility];  Service: Cardiothoracic   • CORONARY STENT PLACEMENT      x 6   • CYSTOSCOPY TRANSURETHRAL RESECTION OF PROSTATE N/A 1/23/2023    Procedure: CYSTOSCOPY TRANSURETHRAL RESECTION OF PROSTATE;  Surgeon: Latrell Pope MD;  Location:  [facility];  Service: Urology;  Laterality: N/A;   • ENDOSCOPIC FUNCTIONAL SINUS SURGERY (FESS) Bilateral 12/13/2017    Procedure: PROCEDURE PERFORMED:  Bilateral functional endoscopic anterior ethmoidectomy with bilateral middle meatal antrostomy Septoplasty Right kathia bullosa resection Bilateral inferior turbinate reduction via Coblation;  Surgeon: Mayank Ibarra MD;  Location: [facility] OR;  Service:    • ENDOSCOPY N/A 07/30/2018    Procedure: ESOPHAGOGASTRODUODENOSCOPY WITH ANESTHESIA;  Surgeon: Benitez Mas MD;  Location: [facility];  Service: Gastroenterology   • ENDOSCOPY N/A 07/14/2020    Procedure: ESOPHAGOGASTRODUODENOSCOPY WITH ANESTHESIA;  Surgeon: Anupam Morales DO;  Location: [facility];  Service: Gastroenterology;  Laterality: N/A;  pre: abdominal pain  post: esophagitis  Vinayak Correia PA   • HERNIA REPAIR      x2 inguinal area   • KIDNEY STONE SURGERY     • KNEE ARTHROSCOPY Right 03/01/2022    Procedure: RIGHT KNEE PARTIAL LATERAL MENISCECTOMY;  Surgeon: Pedro Pablo Song MD;  Location: [facility] OR;   Service: Orthopedics;  Laterality: Right;   • KNEE SURGERY Right    • OTHER SURGICAL HISTORY      urolift   • PROSTATE SURGERY      Dr. Badillo - 2017   • PULMONARY ARTERY PRESSURE SENSOR IMPLANT N/A 5/8/2023    Procedure: PA Pressure Sensor Implant;  Surgeon: Wade Ramey MD;  [facility]:  [facility];  Service: Cardiology;  Laterality: N/A;   • ROTATOR CUFF REPAIR Right    • SLEEP ENDOSCOPY N/A 2/27/2023    Procedure: Videosleep endoscopy;  Surgeon: Mayank Ibarra MD;  Location:  [facility];  Service: ENT;  Laterality: N/A;   • THUMB AMPUTATION Left     partial   • TOE AMPUTATION Right     big       Outpatient Medications Marked as Taking for the 5/11/23 encounter (Office Visit) with Mayank Ibarra MD   Medication Sig Dispense Refill   • acetaminophen (TYLENOL) 325 MG tablet Take 1 tablet by mouth Every 6 (Six) Hours As Needed for Mild Pain.     • albuterol (PROVENTIL HFA;VENTOLIN HFA) 108 (90 BASE) MCG/ACT inhaler Inhale 2 puffs Every 6 (Six) Hours As Needed for Wheezing.     • apixaban (ELIQUIS) 5 MG tablet tablet Take 1 tablet by mouth 2 (Two) Times a Day.     • Aspirin 81 MG capsule Chew 81 mg Daily.     • calcium polycarbophil (FIBERCON) 625 MG tablet Take 1 tablet by mouth Daily As Needed.     • carboxymethylcellulose (REFRESH PLUS) 0.5 % solution Administer 1 drop to both eyes 4 (Four) Times a Day As Needed for Dry Eyes.     • cefuroxime (CEFTIN) 500 MG tablet Take 1 tablet by mouth 2 (Two) Times a Day for 10 days. First dose 3 days prior to surgery 20 tablet 0   • clopidogrel (PLAVIX) 75 MG tablet Take 1 tablet by mouth Daily. 30 tablet 0   • empagliflozin (JARDIANCE) 10 MG tablet tablet Take 1 tablet by mouth Daily.     • ezetimibe (ZETIA) 10 MG tablet Take 1 tablet by mouth Daily.     • fluticasone (FLONASE) 50 MCG/ACT nasal spray 2 sprays into the nostril(s) as directed by provider Daily As Needed for Rhinitis or Allergies.     • furosemide (Lasix) 40 MG tablet Take 1 tablet by  mouth Daily As Needed (take as needed for weight gain more than 2 pounds in a day or more than than 3 pounds in 2 days.). 90 tablet 1   • gabapentin (NEURONTIN) 300 MG capsule Take 1 capsule by mouth Every Night.     • guaiFENesin (MUCINEX) 600 MG 12 hr tablet Take 2 tablets by mouth Daily As Needed for Congestion.     • insulin aspart (novoLOG FLEXPEN) 100 UNIT/ML solution pen-injector sc pen Inject 44 Units under the skin into the appropriate area as directed 3 (Three) Times a Day With Meals. Can use 5 to 6 more units if needed in early evening.     • Insulin Detemir (LEVEMIR FLEXPEN SC) Inject  under the skin into the appropriate area as directed. 100 at night 110 in the morning     • isosorbide mononitrate (IMDUR) 120 MG 24 hr tablet Take 1 tablet by mouth Daily. 90 tablet 3   • lamoTRIgine (LaMICtal) 200 MG tablet Take 1 tablet by mouth 2 (Two) Times a Day. 180 tablet 1   • levETIRAcetam (KEPPRA) 500 MG tablet Take 3 tablets by mouth Every Morning.     • levETIRAcetam (KEPPRA) 500 MG tablet Take 4 tablets by mouth Every Night.     • metFORMIN (GLUCOPHAGE) 1000 MG tablet Take 1 tablet by mouth 2 (Two) Times a Day With Meals.     • metoprolol tartrate (LOPRESSOR) 100 MG tablet Take 1 tablet by mouth 2 (Two) Times a Day. Told to take the DOS-2/27/23, w/ sip of water.     • Multiple Vitamin (MULTI VITAMIN PO) Take 1 tablet by mouth Daily.     • mupirocin (BACTROBAN) 2 % ointment Apply 1 application topically to the appropriate area as directed 2 (Two) Times a Day for 14 days. Bilateral nares start 7 days prior to surgery and continue for 7 days postoperatively 22 g 0   • pantoprazole (PROTONIX) 40 MG EC tablet Take 1 tablet by mouth 2 (Two) Times a Day.     • psyllium (METAMUCIL) 58.6 % packet Take 1 packet by mouth Daily As Needed (constipation).     • Rimegepant Sulfate (Nurtec) 75 MG tablet dispersible tablet Take 1 tablet by mouth As Needed (Migraine). 8 tablet 5   • rosuvastatin (CRESTOR) 20 MG tablet Take 1  tablet by mouth Every Night.     • sacubitril-valsartan (Entresto)  MG tablet Take 1 tablet by mouth 2 (Two) Times a Day.     • spironolactone (ALDACTONE) 50 MG tablet Take 1 tablet by mouth Daily.     • tamsulosin (FLOMAX) 0.4 MG capsule 24 hr capsule Take 2 capsules by mouth Every Night.     • [DISCONTINUED] insulin detemir (LEVEMIR) 100 UNIT/ML injection Inject 110 Units under the skin into the appropriate area as directed 2 (Two) Times a Day.         Flagyl [metronidazole], Atorvastatin, and Ciprofloxacin    Family History   Problem Relation Age of Onset   • Heart disease Father    • COPD Mother    • Hypertension Mother    • Asthma Mother    • No Known Problems Sister    • Colon cancer Paternal Uncle    • Prostate cancer Maternal Grandfather    • No Known Problems Sister    • Colon cancer Maternal Grandmother    • Colon polyps Maternal Grandmother        Social History     Socioeconomic History   • Marital status:    Tobacco Use   • Smoking status: Former     Packs/day: 1.50     Years: 18.00     Pack years: 27.00     Types: Cigarettes, Cigars     Start date:      Quit date:      Years since quittin.3   • Smokeless tobacco: Former     Types: Chew     Quit date:    Vaping Use   • Vaping Use: Never used   Substance and Sexual Activity   • Alcohol use: No     Comment: 0   • Drug use: No   • Sexual activity: Defer       Review of Systems   Constitutional: Positive for fatigue.   HENT:        Admits snoring       Psychiatric/Behavioral: Positive for sleep disturbance.       Vitals:    23 1143   BP: 158/86   Pulse: 64   Resp: 16   Temp: 98 °F (36.7 °C)       Body mass index is 35.59 kg/m².    Objective     Physical Exam  Vitals reviewed.   Constitutional:       Appearance: He is obese.   HENT:      Head: Normocephalic.      Right Ear: External ear normal.      Left Ear: External ear normal.      Nose: Nose normal.      Mouth/Throat:      Lips: Pink.      Mouth: Mucous membranes are  moist.      Pharynx: Uvula midline.      Comments:   Ryan URIBE     Neurological:      Mental Status: He is alert.         Assessment & Plan   Diagnoses and all orders for this visit:    1. Sleep apnea, unspecified type (Primary)  -     Case Request; Standing  -     Comprehensive Metabolic Panel; Future  -     CBC and Differential; Future  -     ECG 12 Lead; Future  -     XR Chest 2 View; Future  -     Case Request    2. Daytime somnolence  -     Case Request; Standing  -     Comprehensive Metabolic Panel; Future  -     CBC and Differential; Future  -     ECG 12 Lead; Future  -     XR Chest 2 View; Future  -     Case Request    3. Snoring  -     Case Request; Standing  -     Comprehensive Metabolic Panel; Future  -     CBC and Differential; Future  -     ECG 12 Lead; Future  -     XR Chest 2 View; Future  -     Case Request    4. Anticoagulated  -     Case Request; Standing  -     Comprehensive Metabolic Panel; Future  -     CBC and Differential; Future  -     ECG 12 Lead; Future  -     XR Chest 2 View; Future  -     Case Request    5. HOA (obstructive sleep apnea)  -     Case Request; Standing  -     Comprehensive Metabolic Panel; Future  -     CBC and Differential; Future  -     ECG 12 Lead; Future  -     XR Chest 2 View; Future  -     Case Request    Other orders  -     Discontinue: cefuroxime (CEFTIN) 500 MG tablet; Take 1 tablet by mouth 2 (Two) Times a Day for 10 days. First dose 3 days prior to surgery  Dispense: 20 tablet; Refill: 0  -     Discontinue: mupirocin (BACTROBAN) 2 % ointment; Apply 1 application topically to the appropriate area as directed 2 (Two) Times a Day for 14 days. Bilateral nares start 7 days prior to surgery and continue for 7 days postoperatively  Dispense: 22 g; Refill: 0  -     cefuroxime (CEFTIN) 500 MG tablet; Take 1 tablet by mouth 2 (Two) Times a Day for 10 days. First dose 3 days prior to surgery  Dispense: 20 tablet; Refill: 0  -     mupirocin (BACTROBAN) 2 % ointment; Apply 1  application topically to the appropriate area as directed 2 (Two) Times a Day for 14 days. Bilateral nares start 7 days prior to surgery and continue for 7 days postoperatively  Dispense: 22 g; Refill: 0  -     Follow Anesthesia Guidelines / Protocol; Future  -     Provide Patient With Instructions on NPO Status  -     Follow Anesthesia Guidelines / Protocol; Standing  -     Verify NPO Status; Standing  -     Obtain Informed Consent; Standing  -     SCD (Sequential Compression Device) - To Be Placed on Patient in Pre-Op; Standing  -     Patient to Void Prior to Transfer to OR; Standing  -     Instructions for Nursing; Standing      HYPOGLOSSAL NERVE STIMULATION DEVICE IMPLANT (N/A)  Orders Placed This Encounter   Procedures   • XR Chest 2 View     Standing Status:   Future     Standing Expiration Date:   5/11/2024     Order Specific Question:   Reason for Exam:     Answer:   preop testing   • Comprehensive Metabolic Panel     Standing Status:   Future     Standing Expiration Date:   5/11/2024     Order Specific Question:   Release to patient     Answer:   Immediate   • Provide Patient With Instructions on NPO Status   • ECG 12 Lead     Standing Status:   Future     Standing Expiration Date:   5/11/2024     Order Specific Question:   Reason for Exam:     Answer:   preop testing   • CBC and Differential     Standing Status:   Future     Standing Expiration Date:   5/11/2024     Order Specific Question:   Manual Differential     Answer:   No     Return in about 5 weeks (around 6/15/2023) for Recheck.     Discussed inspire parameters including bmi   Clearance for implant would also have to come from Dr. Ramey   Repeat weight in one month       Shave facial hair one week prior to surgery. You may have mustache or sideburns, but no facial hair from bottom lip down.   Shave chest hair 3 days prior ot surgery. Shave across midline, from clavicle (collarbone) to below the right nipple.   Begin antibiotics 3 days prior to  procedure   Wash chest and neck daily for 3 days prior to surgery with chloraprep or hibiclense (can find at local pharmacy)   Intranasal bactroban daily for 7 days prior to surgery and 7 days postoperatively       Patient Instructions   The risk benefits and options of HYPOGLOSSAL NERVE STIMULATION DEVICE IMPLANT (Inspire) were discussed with the patient including the risks of bleeding, infection, the need for implant removal, malfunctioning, battery replacement and other issues were discussed at length.  Was also discussed that the patient needed to limit movement of the chest and arm for 10 to 12 days postoperatively in order to limit development of seroma or hematoma and therefore subsequent infection.  The patient understands risk benefits and options and wishes to proceed.    Patient and family were instructed on the proper use of scheduled prescription drugs including their impact on driving and the potential effects during pregnancy.  The potential for overdose was discussed and their safe storage and proper disposal.  The website www.[employer].[place].gov which contains other materials in this regard.

## 2023-05-11 ENCOUNTER — OFFICE VISIT (OUTPATIENT)
Dept: OTOLARYNGOLOGY | Facility: CLINIC | Age: 63
End: 2023-05-11
Payer: MEDICARE

## 2023-05-11 VITALS
RESPIRATION RATE: 16 BRPM | DIASTOLIC BLOOD PRESSURE: 86 MMHG | TEMPERATURE: 98 F | BODY MASS INDEX: 35.54 KG/M2 | HEART RATE: 64 BPM | HEIGHT: 72 IN | SYSTOLIC BLOOD PRESSURE: 158 MMHG | WEIGHT: 262.4 LBS

## 2023-05-11 DIAGNOSIS — Z79.01 ANTICOAGULATED: ICD-10-CM

## 2023-05-11 DIAGNOSIS — R06.83 SNORING: ICD-10-CM

## 2023-05-11 DIAGNOSIS — G47.30 SLEEP APNEA, UNSPECIFIED TYPE: Primary | ICD-10-CM

## 2023-05-11 DIAGNOSIS — R40.0 DAYTIME SOMNOLENCE: ICD-10-CM

## 2023-05-11 DIAGNOSIS — G47.33 OSA (OBSTRUCTIVE SLEEP APNEA): ICD-10-CM

## 2023-05-11 RX ORDER — CEFUROXIME AXETIL 500 MG/1
500 TABLET ORAL 2 TIMES DAILY
Qty: 20 TABLET | Refills: 0 | Status: SHIPPED | OUTPATIENT
Start: 2023-05-11 | End: 2023-05-21

## 2023-05-11 RX ORDER — CEFUROXIME AXETIL 500 MG/1
500 TABLET ORAL 2 TIMES DAILY
Qty: 20 TABLET | Refills: 0 | Status: SHIPPED | OUTPATIENT
Start: 2023-05-11 | End: 2023-05-11

## 2023-05-11 NOTE — PATIENT INSTRUCTIONS
The risk benefits and options of HYPOGLOSSAL NERVE STIMULATION DEVICE IMPLANT (Inspire) were discussed with the patient including the risks of bleeding, infection, the need for implant removal, malfunctioning, battery replacement and other issues were discussed at length.  Was also discussed that the patient needed to limit movement of the chest and arm for 10 to 12 days postoperatively in order to limit development of seroma or hematoma and therefore subsequent infection.  The patient understands risk benefits and options and wishes to proceed.    Patient and family were instructed on the proper use of scheduled prescription drugs including their impact on driving and the potential effects during pregnancy.  The potential for overdose was discussed and their safe storage and proper disposal.  The website www.[employer].[place].gov which contains other materials in this regard.

## 2023-05-15 ENCOUNTER — READMISSION MANAGEMENT (OUTPATIENT)
Dept: CALL CENTER | Facility: HOSPITAL | Age: 63
End: 2023-05-15
Payer: OTHER GOVERNMENT

## 2023-05-15 NOTE — TELEPHONE ENCOUNTER
Mr Guerrero PA diastolics are not at his target goal yet but have trended down considerably.     GOAL: 16 mmHg    Readings since taking his lasix (5/10/23)  05/15/23: 24 mmHg  05/14/23: 23 mmHg  05/13/23: 20 mmHg  05/12/23: 28 mmHg   05/11/23: 45 mmHg    Would you like for him to continue his lasix to see if we can get closer to goal, or have him stop the lasix now and continue to monitor?

## 2023-05-15 NOTE — TELEPHONE ENCOUNTER
has called to check on his cardiomems numbers to see what he needs to do starting today with his Lasix.  (this is the one I left you a voicemail about)    Please advise.    Thanks   WF

## 2023-05-15 NOTE — TELEPHONE ENCOUNTER
I've left patient a detailed message regarding Lasix instructions and left call back instructions if he has any further questions. He's been advised to check back in with us this Friday 05/19/2023.    Thanks  WF

## 2023-05-15 NOTE — OUTREACH NOTE
Medical Week 3 Survey    Flowsheet Row Responses   [facility] facility patient discharged from? [facility]   Does the patient have one of the following disease processes/diagnoses(primary or secondary)? CHF          Osiris S - Registered Nurse

## 2023-05-23 ENCOUNTER — OFFICE VISIT (OUTPATIENT)
Dept: CARDIOLOGY | Facility: CLINIC | Age: 63
End: 2023-05-23
Payer: OTHER GOVERNMENT

## 2023-05-23 VITALS
BODY MASS INDEX: 34.95 KG/M2 | HEIGHT: 72 IN | WEIGHT: 258 LBS | HEART RATE: 72 BPM | SYSTOLIC BLOOD PRESSURE: 120 MMHG | DIASTOLIC BLOOD PRESSURE: 71 MMHG

## 2023-05-23 DIAGNOSIS — Z79.4 TYPE 2 DIABETES MELLITUS WITH OTHER CIRCULATORY COMPLICATION, WITH LONG-TERM CURRENT USE OF INSULIN: Chronic | ICD-10-CM

## 2023-05-23 DIAGNOSIS — I25.118 CORONARY ARTERY DISEASE OF NATIVE ARTERY OF NATIVE HEART WITH STABLE ANGINA PECTORIS: ICD-10-CM

## 2023-05-23 DIAGNOSIS — G47.33 OSA ON CPAP: Chronic | ICD-10-CM

## 2023-05-23 DIAGNOSIS — E11.59 TYPE 2 DIABETES MELLITUS WITH OTHER CIRCULATORY COMPLICATION, WITH LONG-TERM CURRENT USE OF INSULIN: Chronic | ICD-10-CM

## 2023-05-23 DIAGNOSIS — Z95.1 S/P CABG X 2: Chronic | ICD-10-CM

## 2023-05-23 DIAGNOSIS — R06.02 SHORTNESS OF BREATH: ICD-10-CM

## 2023-05-23 DIAGNOSIS — I71.21 ANEURYSM OF ASCENDING AORTA WITHOUT RUPTURE: ICD-10-CM

## 2023-05-23 DIAGNOSIS — I71.20 THORACIC AORTIC ANEURYSM WITHOUT RUPTURE, UNSPECIFIED PART: Primary | ICD-10-CM

## 2023-05-23 DIAGNOSIS — Z79.899 LONG-TERM USE OF HIGH-RISK MEDICATION: ICD-10-CM

## 2023-05-23 DIAGNOSIS — Z99.89 OSA ON CPAP: Chronic | ICD-10-CM

## 2023-05-23 DIAGNOSIS — J90 BILATERAL PLEURAL EFFUSION: ICD-10-CM

## 2023-05-23 DIAGNOSIS — I50.32 CHRONIC DIASTOLIC CONGESTIVE HEART FAILURE: ICD-10-CM

## 2023-05-23 DIAGNOSIS — E78.5 HYPERLIPIDEMIA LDL GOAL <70: Chronic | ICD-10-CM

## 2023-05-23 PROBLEM — I20.0 UNSTABLE ANGINA PECTORIS: Status: RESOLVED | Noted: 2023-04-17 | Resolved: 2023-05-23

## 2023-05-23 PROCEDURE — 99214 OFFICE O/P EST MOD 30 MIN: CPT | Performed by: INTERNAL MEDICINE

## 2023-05-23 NOTE — PROGRESS NOTES
Carlos A Boyer Jr.  [id number]  1960  63 y.o.  male           Referring Provider: Vinayak Correia PA    Reason for Follow-up Visit:       Here for routine follow up   Prior ER visit for Paroxysmal atrial fibrillation with with rapid ventricular response    CARLOS guided cardioversion, on anticoagulation with Eliquis  coronary artery disease stented coronary artery   Type 2 diabetes mellitus     S/p CABG x 2 Dr Tang 7/2019  S/P  right rotator cuff surgery Dr Divina Michelle [facility]   myocardial perfusion scan as below  Coronary CT angiography report as below    Here for routine follow up today  Has CardioMems         Subjective    No  pedal edema   Trying to manage CHF better   Significant problem with swelling and increasing congestive heart failure in past now resolved  Reviewed weights  On medical therapy as below   No PND now   Clss 2 CHF now   In past class 3-4 shortness of breath   Mild chronic exertional shortness of breath on exertion relieved with rest  No significant cough or wheezing    No palpitations  No associated chest pain  No fever or chills  No significant expectoration    No hemoptysis  No presyncope or syncope    Tolerating current medications well with no untoward side effects   Compliant with prescribed medication regimen. Tries to adhere to cardiac diet.     No bleeding, excessive bruising, gait instability or fall risks    BP well controlled at home.     Blood sugars well controlled at home   Less  weak and less tired   No new events or complaints since last visit     History of present illness:  Carlos A Boyer Jr. is a 63 y.o. yo male with Type 2 diabetes mellitus    essential hypertension     chest pain who presents today for   Chief Complaint   Patient presents with    Congestive Heart Failure     2 WK FU-S/P CARDIOMEMS 05/08/2023    .    History  Past Medical History:   Diagnosis Date    Arthritis     Asthma     Cancer     Carotid disease, bilateral     Chest pain     Chronic diastolic  congestive heart failure 09/07/2019    Chronic sinusitis     Maribell bullosa     Coronary artery disease involving native coronary artery of native heart with unstable angina pectoris 01/17/2017    Deviated septum     Diabetes mellitus     Difficulty urinating     Diverticulitis     Enlarged prostate     Fatty liver     GERD (gastroesophageal reflux disease)     [place] (hard of hearing)     Does have hearing aids    Hyperlipidemia LDL goal <70 02/02/2017    Hypertrophy of nasal turbinates     Keratoderma     Kidney stone     Migraine     Murmur, heart     Myocardial infarction     Obesity     Paroxysmal atrial fibrillation 07/11/2019    Personal history of COVID-19 07/2021    PONV (postoperative nausea and vomiting)     Primary hypertension 10/16/2016    Psoriasis     Seizures     Sinus congestion     Skin cancer     Sleep apnea     not using cpap    SOB (shortness of breath)     Stroke     UTI (urinary tract infection)    ,   Past Surgical History:   Procedure Laterality Date    CARDIAC CATHETERIZATION  01/2016    Dr. Broadbent; widely patent previously placed stents in the left anterior descending and obstructive disease involving the diagonal branch which was treated medically    CARDIAC CATHETERIZATION N/A 07/14/2017    Procedure: Left Heart Cath;  Surgeon: Wade Ramey MD;  Location:  [facility];  Service:     CARDIAC CATHETERIZATION Left 10/15/2018    Procedure: Cardiac Catheterization/Vascular Study;  Surgeon: Wade Ramey MD;  Location:  [facility];  Service: Cardiology    CARDIAC CATHETERIZATION  10/15/2018    Procedure: Functional Flow [place];  Surgeon: Wade Ramey MD;  Location:  [facility];  Service: Cardiology    CARDIAC CATHETERIZATION N/A 10/15/2018    Procedure: Left ventriculography;  Surgeon: Wade Ramey MD;  Location:  [facility];  Service: Cardiology    CARDIAC CATHETERIZATION Left 06/26/2019    Procedure: Cardiac  Catheterization/Vascular Study VEL OK  HE WILL WAIT 1 YEAR FOR SHOULDER SURGERY ;  Surgeon: Wade Ramey MD;  Location:  [facility];  Service: Cardiology    CARDIAC CATHETERIZATION Left 04/30/2021    Procedure: Coronary angiography;  Surgeon: Sahil Llamas MD;  Location:  [facility];  Service: Cardiology;  Laterality: Left;    CARDIAC CATHETERIZATION N/A 04/30/2021    Procedure: Percutaneous Coronary Intervention;  Surgeon: Sahil Llamas MD;  Location:  [facility];  Service: Cardiology;  Laterality: N/A;    CARDIAC CATHETERIZATION N/A 11/09/2022    Procedure: Left Heart Cath with SVGs;  Surgeon: Wade Ramey MD;  Location:  [facility];  Service: Cardiology;  Laterality: N/A;    CHOLECYSTECTOMY      CHOLECYSTECTOMY WITH INTRAOPERATIVE CHOLANGIOGRAM N/A 08/01/2018    Procedure: CHOLECYSTECTOMY LAPAROSCOPIC INTRAOPERATIVE CHOLANGIOGRAM;  Surgeon: Shane Ann MD;  Location:  [facility] OR;  Service: General    COLONOSCOPY N/A 07/14/2020    Procedure: COLONOSCOPY WITH ANESTHESIA;  Surgeon: Anupam Morales DO;  Location: [facility] ENDOSCOPY;  Service: Gastroenterology;  Laterality: N/A;  pre: abdominal pain  post: diverticulosis  Vinayak Correia PA    CORONARY ANGIOPLASTY      CORONARY ARTERY BYPASS GRAFT N/A 07/06/2019    Procedure: CABG X2 WITH LIMA, LEFT LEG OVH, AND PLACEMENT OF LEFT FEMORAL ARTERIAL LINE;  Surgeon: Steven Tang MD;  Location:  [facility] OR;  Service: Cardiothoracic    CORONARY STENT PLACEMENT      x 6    CYSTOSCOPY TRANSURETHRAL RESECTION OF PROSTATE N/A 1/23/2023    Procedure: CYSTOSCOPY TRANSURETHRAL RESECTION OF PROSTATE;  Surgeon: Latrell Pope MD;  Location:  [facility] OR;  Service: Urology;  Laterality: N/A;    ENDOSCOPIC FUNCTIONAL SINUS SURGERY (FESS) Bilateral 12/13/2017    Procedure: PROCEDURE PERFORMED:  Bilateral functional endoscopic anterior ethmoidectomy with bilateral middle meatal antrostomy  Septoplasty Right kathia bullosa resection Bilateral inferior turbinate reduction via Coblation;  Surgeon: Mayank Ibarra MD;  Location: [facility] OR;  Service:     ENDOSCOPY N/A 07/30/2018    Procedure: ESOPHAGOGASTRODUODENOSCOPY WITH ANESTHESIA;  Surgeon: Benitez Mas MD;  Location:  [facility] ENDOSCOPY;  Service: Gastroenterology    ENDOSCOPY N/A 07/14/2020    Procedure: ESOPHAGOGASTRODUODENOSCOPY WITH ANESTHESIA;  Surgeon: Anupam Morales DO;  Location:  [facility] ENDOSCOPY;  Service: Gastroenterology;  Laterality: N/A;  pre: abdominal pain  post: esophagitis  Vinayak Correia PA    HERNIA REPAIR      x2 inguinal area    KIDNEY STONE SURGERY      KNEE ARTHROSCOPY Right 03/01/2022    Procedure: RIGHT KNEE PARTIAL LATERAL MENISCECTOMY;  Surgeon: Pedro Pablo Song MD;  Location: [facility] OR;  Service: Orthopedics;  Laterality: Right;    KNEE SURGERY Right     OTHER SURGICAL HISTORY      urolift    PROSTATE SURGERY      Dr. Badillo - 2017    PULMONARY ARTERY PRESSURE SENSOR IMPLANT N/A 5/8/2023    Procedure: PA Pressure Sensor Implant;  Surgeon: Wade Ramey MD;  Location: [facility] CATH INVASIVE LOCATION;  Service: Cardiology;  Laterality: N/A;    ROTATOR CUFF REPAIR Right     SLEEP ENDOSCOPY N/A 2/27/2023    Procedure: Videosleep endoscopy;  Surgeon: Mayank Ibarra MD;  Location: [facility] OR;  Service: ENT;  Laterality: N/A;    THUMB AMPUTATION Left     partial    TOE AMPUTATION Right     big   ,   Family History   Problem Relation Age of Onset    Heart disease Father     COPD Mother     Hypertension Mother     Asthma Mother     No Known Problems Sister     Colon cancer Paternal Uncle     Prostate cancer Maternal Grandfather     No Known Problems Sister     Colon cancer Maternal Grandmother     Colon polyps Maternal Grandmother    ,   Social History     Tobacco Use    Smoking status: Former     Packs/day: 1.50     Years: 18.00     Pack years: 27.00     Types: Cigarettes, Cigars     Start date: 1975      Quit date:      Years since quittin.4     Passive exposure: Past    Smokeless tobacco: Former     Types: Chew     Quit date:    Vaping Use    Vaping Use: Never used   Substance Use Topics    Alcohol use: No     Comment: 0    Drug use: No   ,     Medications  Current Outpatient Medications   Medication Sig Dispense Refill    acetaminophen (TYLENOL) 325 MG tablet Take 1 tablet by mouth Every 6 (Six) Hours As Needed for Mild Pain.      albuterol (PROVENTIL HFA;VENTOLIN HFA) 108 (90 BASE) MCG/ACT inhaler Inhale 2 puffs Every 6 (Six) Hours As Needed for Wheezing.      apixaban (ELIQUIS) 5 MG tablet tablet Take 1 tablet by mouth 2 (Two) Times a Day.      Aspirin 81 MG capsule Chew 81 mg Daily.      calcium polycarbophil (FIBERCON) 625 MG tablet Take 1 tablet by mouth Daily As Needed.      carboxymethylcellulose (REFRESH PLUS) 0.5 % solution Administer 1 drop to both eyes 4 (Four) Times a Day As Needed for Dry Eyes.      clopidogrel (PLAVIX) 75 MG tablet Take 1 tablet by mouth Daily. 30 tablet 0    empagliflozin (JARDIANCE) 10 MG tablet tablet Take 1 tablet by mouth Daily.      ezetimibe (ZETIA) 10 MG tablet Take 1 tablet by mouth Daily.      fluticasone (FLONASE) 50 MCG/ACT nasal spray 2 sprays into the nostril(s) as directed by provider Daily As Needed for Rhinitis or Allergies.      furosemide (Lasix) 40 MG tablet Take 1 tablet by mouth Daily As Needed (take as needed for weight gain more than 2 pounds in a day or more than than 3 pounds in 2 days.). 90 tablet 1    gabapentin (NEURONTIN) 300 MG capsule Take 1 capsule by mouth Every Night.      guaiFENesin (MUCINEX) 600 MG 12 hr tablet Take 2 tablets by mouth Daily As Needed for Congestion.      insulin aspart (novoLOG FLEXPEN) 100 UNIT/ML solution pen-injector sc pen Inject 44 Units under the skin into the appropriate area as directed 3 (Three) Times a Day With Meals. Can use 5 to 6 more units if needed in early evening.      Insulin Detemir (LEVEMIR  FLEXPEN SC) Inject  under the skin into the appropriate area as directed. 100 at night 110 in the morning      isosorbide mononitrate (IMDUR) 120 MG 24 hr tablet Take 1 tablet by mouth Daily. 90 tablet 3    lamoTRIgine (LaMICtal) 200 MG tablet Take 1 tablet by mouth 2 (Two) Times a Day. 180 tablet 1    levETIRAcetam (KEPPRA) 500 MG tablet Take 3 tablets by mouth Every Morning.      levETIRAcetam (KEPPRA) 500 MG tablet Take 4 tablets by mouth Every Night.      metFORMIN (GLUCOPHAGE) 1000 MG tablet Take 1 tablet by mouth 2 (Two) Times a Day With Meals.      metoprolol tartrate (LOPRESSOR) 100 MG tablet Take 1 tablet by mouth 2 (Two) Times a Day. Told to take the DOS-2/27/23, w/ sip of water.      Multiple Vitamin (MULTI VITAMIN PO) Take 1 tablet by mouth Daily.      mupirocin (BACTROBAN) 2 % ointment Apply 1 application topically to the appropriate area as directed 2 (Two) Times a Day for 14 days. Bilateral nares start 7 days prior to surgery and continue for 7 days postoperatively 22 g 0    nitroglycerin (NITROSTAT) 0.4 MG SL tablet Place 1 tablet under the tongue Every 5 (Five) Minutes As Needed for Chest Pain. Take no more than 3 doses in 15 minutes.      pantoprazole (PROTONIX) 40 MG EC tablet Take 1 tablet by mouth 2 (Two) Times a Day.      psyllium (METAMUCIL) 58.6 % packet Take 1 packet by mouth Daily As Needed (constipation).      Rimegepant Sulfate (Nurtec) 75 MG tablet dispersible tablet Take 1 tablet by mouth As Needed (Migraine). 8 tablet 5    rosuvastatin (CRESTOR) 20 MG tablet Take 1 tablet by mouth Every Night.      sacubitril-valsartan (Entresto)  MG tablet Take 1 tablet by mouth 2 (Two) Times a Day.      spironolactone (ALDACTONE) 50 MG tablet Take 1 tablet by mouth Daily.      tamsulosin (FLOMAX) 0.4 MG capsule 24 hr capsule Take 2 capsules by mouth Every Night.       No current facility-administered medications for this visit.       Allergies:  Flagyl [metronidazole], Atorvastatin, and  "Ciprofloxacin    Review of Systems  Review of Systems   Constitutional: Positive for malaise/fatigue.   HENT: Negative.     Eyes: Negative.    Cardiovascular:  Positive for dyspnea on exertion and leg swelling. Negative for chest pain, claudication, cyanosis, irregular heartbeat, near-syncope, orthopnea, palpitations, paroxysmal nocturnal dyspnea and syncope.   Respiratory: Negative.     Endocrine: Negative.    Hematologic/Lymphatic: Negative.    Skin: Negative.    Musculoskeletal:  Positive for arthritis and back pain.   Gastrointestinal:  Negative for anorexia.   Genitourinary: Negative.    Neurological:  Positive for dizziness, light-headedness and weakness.   Psychiatric/Behavioral: Negative.       Objective     Physical Exam:      Vitals:    23 0840   BP: 120/71   Pulse: 72   Weight: 117 kg (258 lb)   Height: 182.9 cm (72.01\")         Physical Exam   Constitutional: He appears well-developed.   HENT:   Head: Normocephalic.   Neck: Normal carotid pulses and no JVD present. No tracheal tenderness present. Carotid bruit is not present. No tracheal deviation present.   Cardiovascular: Regular rhythm and normal pulses.   Murmur heard.  Systolic murmur is present with a grade of 2/6.  Pulmonary/Chest: Effort normal. No stridor.   Abdominal: Soft. He exhibits no distension. There is no abdominal tenderness.   Musculoskeletal:      Right lower le+ Pitting Edema present.      Left lower le+ Pitting Edema present.   Neurological: He is alert. No cranial nerve deficit or sensory deficit.   Skin: Skin is warm.   Psychiatric: His speech is normal and behavior is normal.     Results Review:    Conclusion of cardiac catheterization    2022        Coronary angiography:  Mild stenosis of left main coronary artery without obstructive disease  Left anterior descending coronary artery has multiple stents in the proximal and midportion after which the vessel is occluded  No obstructive disease of diagonal " branch  No significant disease of main trunk of left circumflex coronary artery  First obtuse marginal branch has 50% stenosis with patent bypass graft  Second and third obtuse marginal branch does not have any obstructive disease  Patent saphenous venous graft to the obtuse marginal branch  Patent left internal mammary artery graft to the left anterior descending coronary artery  Right coronary artery has mild atherosclerotic changes in the proximal and midportion without obstructive disease.         Left heart cath: LVEDP  23  mm Hg with no gradient across aortic valve on pullback.      LV Gram : Not performed to save contrast load        ____________________________________________________________________________________________________________________________________________     Plan after cardiac catheterization        Overall no obstructive coronary artery disease  Optimal guideline directed medical therapy  Oral hydration  Monitor kidney functions as outpatient     Usual post procedure precautions after arteriotomy including monitoring for signs both local and systemic side effects.  On ultimate discharge will receive printed instructions  Intensive risk factor modifications for both primary and secondary prevention if applicable  Hydration   Observation         FINDINGS:    Pulmonary arteries: There is adequate enhancement of the pulmonary  arteries to evaluate for central and segmental pulmonary emboli. There  are no filling defects within the main, lobar, segmental or visualized  subsegmental pulmonary arteries. The arteries are prominent mild  pulmonary arterial hypertension may be present..      Aorta and great vessels: The ascending aorta is 4 cm in diameter. No  evidence of dissection.. The great vessels are normal in appearance.     Visualized neck base: The imaged portion of the base of the neck appears  unremarkable.      Lungs: The lungs are clear. There is no mass, worrisome nodule,  or  consolidation. No pleural effusion is seen. The trachea and bronchial  tree are patent.      Heart: Cardiac silhouette is mildly enlarged. Vascular calcifications  noted in the coronary arteries. The stent in the LAD is present. Wires  are present from previous median sternotomy. There is no pericardial  effusion.      Mediastinum and lymph nodes: No enlarged mediastinal, hilar, or axillary  lymph nodes are present.      Skeletal and soft tissues: The osseous structures of the thorax and  surrounding soft tissues demonstrate no acute process.     Upper abdomen: The imaged portion of the upper abdomen demonstrates no  acute process.      IMPRESSION:  1. No evidence of embolic disease  2. Cardiomegaly and coronary artery calcification. With a stent in the  LAD.         Results for orders placed during the hospital encounter of 04/17/23    Adult Transthoracic Echo Complete W/ Cont if Necessary Per Protocol    Interpretation Summary    Left ventricular systolic function is normal. Left ventricular ejection fraction appears to be 61 - 65%.    The following left ventricular wall segments are very mildly hypokinetic: apical inferior, apical septal and apex hypokinetic.    Left ventricular diastolic function is consistent with (grade II w/high LAP) pseudonormalization.    Left atrial volume is mildly increased.    Estimated right ventricular systolic pressure from tricuspid regurgitation is normal (<35 mmHg).    Normal size and function of the right ventricle.    No significant valvular pathology.    No significant change compared to prior exam from 4/29/21.         Impression:  1.  Native two-vessel coronary artery disease, including chronic total occlusion of the mid-LAD and moderate to severe stenosis of the ostium of OM1.  Both bypass grafts (LIMA to LAD and SVG to OM1) are widely patent.  No changes in native anatomy compared to pre-CABG films from 2019 (with the exception of mid-LAD, which is now chronically and  totally occluded, which is expected in the presence of the LIMA, which is now responsible for filling the remainder of the mid to distal LAD).  2.  No culprit stenosis seen for current presentation.  3.  Normal LVEF  4.  Elevated LVEDP.     Plan:   1.  2 hours bedrest  2.  Continue aspirin 81mg daily, and can resume Eliquis with p.m. dose on 5/13.    3.  Increase Imdur to 60 mg daily for improved antianginal benefit  4.  Increase lisinopril dose to 40 mg daily, as some of his symptoms may be attributed to poorly controlled hypertension  5.  Results communicated with primary hospital attending, Dr. Beckman  6.  Follow-up with primary cardiologist, Dr. Ramey, as planned in July, or sooner with persistent/worsening symptoms     Electronically signed by Sahil Llamas MD, 04/30/21, 9:38 AM CDT.       Cardiac CT angiography 9/16/2020     Impression:      1. Total calcium score : 3345.6  2.  Patent left internal mammary artery graft.  Severe stenosis of the mid left anterior descending coronary artery where there are earlier implanted stents  3.  Suspected more than 50% stenosis of the left anterior descending coronary artery after touchdown site of the left internal mammary artery graft  4.  Severe stenosis of proximal first obtuse marginal branch  5.   Patent saphenous venous graft to the first obtuse marginal branch with suspected more than 50% stenosis at the touchdown site.  Significant coronary calcification preventing good visualization of the anastomotic site.  6.   50 to 60% stenosis of the right posterior descending coronary artery        ___________________________________________________________________________     Recommendations:     Ongoing risk factor modifications  Further evaluation if ongoing chest pain or high risk of suspicion of significant ischemic heart disease          Carlos A Boyer Jr.   Regadenoson Stress Test With Myocardial Perfusion SPECT (Multi Study)   Order# [id number]   Reading  physician: Wade Ramey MD Ordering physician: Wade Ramey MD Study date: 20   Patient Information     Patient Name  Carlos A Boyer Jr. MRN  [id number] Sex  Male  (Age)  1960 (60 y.o.)   Interpretation Summary     Left ventricular ejection fraction is normal (Calculated EF = 54%).  Myocardial perfusion imaging indicates a medium-sized infarct located in the anterior wall and apex with no significant ischemia noted.  Diaphragmatic attenuation and GI artifacts are present.  Raw images reviewed with the following abnormalities noted: vertical motion.           Conclusion of cardiac catheterization     Left main coronary arteries normal  Left anterior descending coronary artery has patent stents  The distal edge of the stent in the mid left anterior descending coronary artery has approximately 60% stenosis  Highly abnormal fractional flow reserve of this at 0.78 without adenosine stress.  PTCA done of the segment.  Despite multiple attempts and use of guide liner could not advance the stent due to earlier implanted stents.  Left circumflex coronary artery is codominant and large  The proximal portion of the first obtuse marginal branch has a 60% stenosis with normal fractional flow reserve above 0.85.  Right coronary artery is large and codominant without any high-grade lesions.     Left ventricular end-diastolic pressure is mildly elevated to 15 mmHg.  No gradient across aortic valve on pullback.  Left ventriculography shows a hyperdynamic left ventricle with ejection fraction of 75%.     Percutaneous coronary intervention     XB 3.5 guide advised used for fractional flow reserve of the obtuse marginal branch as well as of the left anterior descending coronary artery  Then we did try to advance a 2.5 mm stent.  We used 2 different size stents and could not cross  We used a guide liner and does not did not have placed cross across the stent.  We then did balloon angioplasty using 2.0 mm  balloon.  Stenosis was reduced from 60% down to less than 10%.  BEVERLEY-3 flow before and after procedure.           ____________________________________________________________________________________________________________________________________________     Plan after cardiac catheterization        Dual antiplatelet therapy minimum of 1 year preferably longer  Statin ACE inhibitors beta-blockers  If there is repeat restenosis of the left anterior descending coronary artery would consider surgical revascularization.  Target LDL less than 70 mg/dL.  Maximize antianginal therapy.  Intensive risk factor modifications for both primary and secondary prevention if applicable  Hydration   Observation     Results for orders placed during the hospital encounter of 04/17/23    Adult Transthoracic Echo Complete W/ Cont if Necessary Per Protocol    Interpretation Summary    Left ventricular systolic function is normal. Left ventricular ejection fraction appears to be 61 - 65%.    The following left ventricular wall segments are very mildly hypokinetic: apical inferior, apical septal and apex hypokinetic.    Left ventricular diastolic function is consistent with (grade II w/high LAP) pseudonormalization.    Left atrial volume is mildly increased.    Estimated right ventricular systolic pressure from tricuspid regurgitation is normal (<35 mmHg).    Normal size and function of the right ventricle.    No significant valvular pathology.    No significant change compared to prior exam from 4/29/21.  10/15/18        Conclusion     The left main coronary artery is normal  Left anterior descending coronary artery and diagonal branches are patent with a widely patent stent noted in the  left anterior descending coronary artery    Mid left anterior descending coronary artery is a 50% stenosis and hemodynamically not significant.  Left circumflex coronary artery is large with a first obtuse marginal around 40-60% stenosis.  Fractional flow  reserve of this is completely normal and at above 0.9.  Left circumflex coronary artery is a codominant.  Right coronary artery is codominant large with distal small vessel disease.     Left ventricular end-diastolic pressure is normal at 11 mmHg no gradient across aortic valve on pullback  Left ventriculography shows ejection fraction of 55%.           Plan     Continue current medication  Symptomatic treatment for small vessel disease with antianginals  If stable can be discharged home later today  Intensive risk factor modifications for both primary and secondary prevention if applicable  Hydration   Observation         7/17 cath  Conclusion  Nonocclusive epicardial coronary arteries with widely patent stents in the  left anterior descending coronary artery   artery and left circumflex coronary artery.  Hyperdynamic left ventricle with ejection fraction of 75%.  LVEDP   17    mm Hg     Plan  Workup for noncardiac causes of chest pain this can be done as outpatient.  His d-dimer is within normal range  After a period of observation of stable can be discharged either later today.  Keep follow-up appointment with me  Ongoing risk factor modifications keep LDL below 70 mg/dL  Hydration   Observation    ____________________________________________________________________________________________________________________________________________  Health maintenance and recommendations    Low salt/ HTN/ Heart healthy carbohydrate restricted cardiac diet (printed dietary and general instructions provided for home review )  The patient is advised to reduce or avoid caffeine or other cardiac stimulants.     The patient was advised to avoid long term NSAIDS , use Tylenol PRN instead  Avoid cardiac stimulants including common drugs like Pseudoephedrine or excessive amounts of caffeine  Monitor for any signs of bleeding including red or dark stools. Fall precautions.   Advised staying uptodate with immunizations per established  standard guidelines.  Offered to give patient  a copy      Questions were encouraged, asked and answered to the patient's  understanding and satisfaction. Questions if any regarding current medications and side effects, need for refills and importance of compliance to medications stressed.    Reviewed available prior notes, consults, prior visits, laboratory findings, radiology and cardiology relevant reports. Updated chart as applicable. I have reviewed the patient's medical history in detail and updated the computerized patient record as relevant.      Updated patient regarding any new or relevant abnormalities on review of records or any new findings on physical exam. Mentioned to patient about purpose of visit and desirable health short and long term goals and objectives.    Primary to monitor CBC CMP Lipid panel and TSH as applicable    ___________________________________________________________________________________________________________________________________________         Procedures     Diagnoses and all orders for this visit:    1. Thoracic aortic aneurysm without rupture, unspecified part (Primary)    2. Type 2 diabetes mellitus with other circulatory complication, with long-term current use of insulin    3. S/P CABG x 2    4. Shortness of breath    5. HOA on CPAP    6. Long-term use of high-risk medication    7. Hyperlipidemia LDL goal <70    8. Coronary artery disease of native artery of native heart with stable angina pectoris    9. Chronic diastolic congestive heart failure    10. Bilateral pleural effusion    11. Aneurysm of ascending aorta without rupture            Plan    Discussed CardioMems   Patient is doing a remarkable work on reducing salt intake   CardioMems improved parameters   When increased salt consumption the numbers go up   Flexible diuretic dosing based on CardioMems      Check BP and heart rates twice daily initially till blood pressures and heart rates under good control and  then at least 3x / week,   If blood pressures continue to be well-controlled then can check week a month  at home and bring a recording for review next visit  If BP >130/85 or < 100/60 persistently over 3 reading 30 mins apart or if heart rates persistently above 100 bpm or less than 55 bpm call sooner for evaluation and advise     Under F/U CTS for thoracic aortic aneurysm       The current medical regimen is effective;  continue present plan and medications.   Keep A1c less than 7 Primary to monitor  Keep LDL below 70 mg/dl. Monitor liver and renal functions.   Monitor CBC, CMP, TSH (as indicated) and Lipid Panel by primary      S/L NTG PRN for chest pain, call me or go to ER if has to use S/L nitroglycerin      I support the patient's decision to take the Covid -19 vaccine   Had required complete course   No major issues   Now fully immunized    Recommend booster     Monitor for any signs of bleeding including red or dark stools as well as easy bruisabilty. Fall precautions.          Return in about 6 months (around 11/23/2023).

## 2023-06-05 RX ORDER — CLOPIDOGREL BISULFATE 75 MG/1
75 TABLET ORAL DAILY
Qty: 90 TABLET | Refills: 1 | Status: SHIPPED | OUTPATIENT
Start: 2023-06-05

## 2023-06-18 ENCOUNTER — NURSE TRIAGE (OUTPATIENT)
Dept: CALL CENTER | Facility: HOSPITAL | Age: 63
End: 2023-06-18
Payer: MEDICARE

## 2023-06-18 NOTE — TELEPHONE ENCOUNTER
Patient states he is having no other symptoms, only the 6 lb weight gain. No shortness of breath and no edema noted. He states he took his PRN Lasix.     Told patient he is doing a great job in monitoring his weight and noticing changes. Since he has already taken his lasix and is having no symptoms he should monitor progress with the Lasix. If he develops any symptoms he should call back and if weight does not change in the morning then he should reach out to his provider.

## 2023-06-18 NOTE — TELEPHONE ENCOUNTER
"Reason for Disposition   Nursing judgment or information in reference    Additional Information   Negative: Nursing judgment, per information in Reference   Negative: Information only call about a Well Adult (no illness or injury)   Negative: Caller can't be reached by phone   Negative: Nursing judgment or information in reference   Negative: Nursing judgment or information in reference   Negative: Nursing judgment or information in reference   Negative: Nursing judgment or information in reference   Negative: Nursing judgment or information in reference   Negative: Nursing judgment or information in reference   Negative: Nursing judgment or information in reference   Negative: Nursing judgment or information in reference   Negative: Nursing judgment or information in reference   Negative: Nursing judgment or information in reference    Answer Assessment - Initial Assessment Questions  1. REASON FOR CALL: \"What is your main concern right now?\"      Wants to know what he should do regarding his 6lb weight gain with his heart failure  2. ONSET: \"When did the weight gain start?\"      Yesterday and today  3. SEVERITY: \"How bad is the weight gain?\"      6 lbs  4. FEVER: \"Do you have a fever?\"      no  5. OTHER SYMPTOMS: \"Do you have any other new symptoms?\"      No shortness of breath, no edema noted in extremeties, groin or abdomen  6. TREATMENTS AND RESPONSE: \"What have you done so far to try to make this better? What medicines have you used?\"      Took his dose of prn Lasix prescribed for him  7. PREGNANCY: \"Is there any chance you are pregnant?\" \"When was your last menstrual period?\"      N/a    Protocols used: No Guideline Available-ADULT-AH    "

## 2023-06-19 ENCOUNTER — TELEPHONE (OUTPATIENT)
Dept: CARDIOLOGY | Facility: CLINIC | Age: 63
End: 2023-06-19
Payer: OTHER GOVERNMENT

## 2023-06-19 NOTE — TELEPHONE ENCOUNTER
Caller: Carlos A Boyer Jr.     Relationship: SELF    Best call back number: [phone]    What is your medical concern? PATIENT GAINED 6LBS OVER THE WEEKEND. HE WAS EXPERIENCING THE SWELLING PRIMARILY IN HIS ABDOMEN. HE WAS ALSO EXPERIENCING SHORTNESS OF BREATH. THE PATIENT TOOK ONE OF HIS FLUID PILLS ON 06.18.23. THIS CAUSED HIM TO LOSE 2LBS. HE STATES THAT HIS BP AND HR ARE GOOD, BUT HIS OXYGEN IS A LITTLE LOW. THE PATIENT WOULD LIKE ADVICE ON WHAT TO DO. HE IS UNSURE IF HE NEEDS TO CONTINUE TAKING THE FLUID PILL FOR A FEW DAYS.    How long has this issue been going on? SWELLING STARTED ON SATURDAY AFTERNOON    Is your provider already aware of this issue? NO    Have you been treated for this issue? PATIENT HAS BEEN TREATED FOR SWELLING BEFORE IN THE PAST DUE TO HIS CONGESTIVE HEART FAILURE

## 2023-06-19 NOTE — TELEPHONE ENCOUNTER
Call returned to patient. He states he feels fine. 6lb weight gain this weekend. Has taken Lasix once. Encouraged to take Lasix PRN as needed for overnight weight gain of 2 lbs.Patient has cardiomems , but has not heard from team yet.

## 2023-06-22 PROBLEM — Z95.818 PRESENCE OF CARDIOMEMS HF SYSTEM: Status: ACTIVE | Noted: 2023-06-22

## 2023-07-18 ENCOUNTER — TELEPHONE (OUTPATIENT)
Dept: NEUROLOGY | Facility: CLINIC | Age: 63
End: 2023-07-18

## 2023-07-18 NOTE — TELEPHONE ENCOUNTER
The [facility] received a fax that requires your attention. The document has been indexed to the patient’s chart for your review.      Reason for sending: SO PROVIDER IS AWARE DOCUMENT IS IN PATIENT'S CHART    Documents Description: [facility]POINT[facility] FOR 8/22/23---7/15/23    Name of Sender: [facility]    Date Indexed: 7/18/23

## 2023-07-25 ENCOUNTER — TELEPHONE (OUTPATIENT)
Dept: VASCULAR SURGERY | Facility: CLINIC | Age: 63
End: 2023-07-25
Payer: OTHER GOVERNMENT

## 2023-07-26 ENCOUNTER — HOSPITAL ENCOUNTER (OUTPATIENT)
Dept: ULTRASOUND IMAGING | Facility: HOSPITAL | Age: 63
Discharge: HOME | End: 2023-07-26
Payer: OTHER GOVERNMENT

## 2023-08-22 ENCOUNTER — OFFICE VISIT (OUTPATIENT)
Dept: NEUROLOGY | Facility: CLINIC | Age: 63
End: 2023-08-22
Payer: OTHER GOVERNMENT

## 2023-08-22 VITALS
WEIGHT: 250 LBS | SYSTOLIC BLOOD PRESSURE: 126 MMHG | HEART RATE: 65 BPM | OXYGEN SATURATION: 96 % | BODY MASS INDEX: 33.86 KG/M2 | DIASTOLIC BLOOD PRESSURE: 70 MMHG | HEIGHT: 72 IN

## 2023-08-22 DIAGNOSIS — G40.909 SEIZURE DISORDER: Primary | ICD-10-CM

## 2023-08-22 DIAGNOSIS — G63 POLYNEUROPATHY ASSOCIATED WITH UNDERLYING DISEASE: ICD-10-CM

## 2023-08-22 DIAGNOSIS — G43.009 MIGRAINE WITHOUT AURA AND WITHOUT STATUS MIGRAINOSUS, NOT INTRACTABLE: ICD-10-CM

## 2023-08-22 RX ORDER — LEVETIRACETAM 500 MG/1
1500 TABLET ORAL EVERY MORNING
Qty: 90 TABLET | Refills: 5 | Status: SHIPPED | OUTPATIENT
Start: 2023-08-22

## 2023-08-22 RX ORDER — LEVETIRACETAM 500 MG/1
2000 TABLET ORAL NIGHTLY
Qty: 120 TABLET | Refills: 5 | Status: SHIPPED | OUTPATIENT
Start: 2023-08-22

## 2023-08-22 RX ORDER — RIMEGEPANT SULFATE 75 MG/75MG
75 TABLET, ORALLY DISINTEGRATING ORAL AS NEEDED
Qty: 8 TABLET | Refills: 5 | Status: SHIPPED | OUTPATIENT
Start: 2023-08-22

## 2023-08-22 RX ORDER — LAMOTRIGINE 200 MG/1
200 TABLET ORAL 2 TIMES DAILY
Qty: 180 TABLET | Refills: 1 | Status: SHIPPED | OUTPATIENT
Start: 2023-08-22

## 2023-08-22 NOTE — PROGRESS NOTES
Neurology Progress Note      Chief Complaint:    Seizure  Neuropathy   Headache    Subjective   History of Present Illness:  Carlos A Boyer Jr. is a 63 y.o. male who presents today for seizure and headache.  He is routinely followed by Vinayak Correia PA for primary care.     Seizure  He denies any new seizure activity.  He continues to take Lamictal 200 mg 2 times daily and Keppra 1500 mg every morning and 2000 mg every night.  He denies any new side effects of the medications.  He denies any missed medication doses or any recent illnesses.  He continues to remain with seizure precautions but otherwise remains well from a seizure standpoint.    Neuropathy   He continues to report numbness in his feel that comes to the level of his ankle.  He read continues to take gabapentin 300 mg nightly and it successfully subsides his pain and he is able to sleep well.  He does have an ulcer on his feet that he continues to follow-up with podiatry for.    Headache  He reports 1 migrainous headache per month that is successfully aborted with Nurtec.  [facility] but Nurtec is very successful at helping abort his migraine when he gets 1.  There are no changes in the character or quality of his migraines at this time.    Allergies:    Flagyl [metronidazole], Atorvastatin, and Ciprofloxacin    Medications:  Current Outpatient Medications   Medication Sig Dispense Refill    acetaminophen (TYLENOL) 325 MG tablet Take 1 tablet by mouth Every 6 (Six) Hours As Needed for Mild Pain.      albuterol (PROVENTIL HFA;VENTOLIN HFA) 108 (90 BASE) MCG/ACT inhaler Inhale 2 puffs Every 6 (Six) Hours As Needed for Wheezing.      apixaban (ELIQUIS) 5 MG tablet tablet Take 1 tablet by mouth 2 (Two) Times a Day.      Aspirin 81 MG capsule Take 81 mg by mouth Daily. Dr. Ramey or Dr. Tang      calcium polycarbophil (FIBERCON) 625 MG tablet Take 1 tablet by mouth Daily As Needed.       carboxymethylcellulose (REFRESH PLUS) 0.5 % solution Administer 1 drop to both eyes 4 (Four) Times a Day As Needed for Dry Eyes.      empagliflozin (JARDIANCE) 10 MG tablet tablet Take 1 tablet by mouth Daily.      ezetimibe (ZETIA) 10 MG tablet Take 1 tablet by mouth Daily.      fluticasone (FLONASE) 50 MCG/ACT nasal spray 2 sprays into the nostril(s) as directed by provider Daily As Needed for Rhinitis or Allergies.      furosemide (Lasix) 40 MG tablet Take 1 tablet by mouth Daily As Needed (take as needed for weight gain more than 2 pounds in a day or more than than 3 pounds in 2 days.). 90 tablet 1    gabapentin (NEURONTIN) 300 MG capsule Take 1 capsule by mouth Every Night.      guaiFENesin (MUCINEX) 600 MG 12 hr tablet Take 2 tablets by mouth Daily As Needed for Congestion.      insulin aspart (novoLOG FLEXPEN) 100 UNIT/ML solution pen-injector sc pen Inject 40 Units under the skin into the appropriate area as directed 3 (Three) Times a Day With Meals. Can use 5 to 6 more units if needed in early evening.      Insulin Detemir (LEVEMIR FLEXPEN SC) Inject  under the skin into the appropriate area as directed. 100 at night 110 in the morning      isosorbide mononitrate (IMDUR) 120 MG 24 hr tablet Take 1 tablet by mouth Daily. 90 tablet 3    lamoTRIgine (LaMICtal) 200 MG tablet Take 1 tablet by mouth 2 (Two) Times a Day. 180 tablet 1    levETIRAcetam (KEPPRA) 500 MG tablet Take 3 tablets by mouth Every Morning.      levETIRAcetam (KEPPRA) 500 MG tablet Take 4 tablets by mouth Every Night.      metFORMIN (GLUCOPHAGE) 1000 MG tablet Take 1 tablet by mouth 2 (Two) Times a Day With Meals.      metoprolol tartrate (LOPRESSOR) 100 MG tablet Take 1 tablet by mouth 2 (Two) Times a Day. Told to take the DOS-2/27/23, w/ sip of water.      Multiple Vitamin (MULTI VITAMIN PO) Take 1 tablet by mouth Daily.      nitroglycerin (NITROSTAT) 0.4 MG SL tablet Place 1 tablet under the tongue Every 5 (Five) Minutes As Needed for Chest  Pain. Take no more than 3 doses in 15 minutes.      pantoprazole (PROTONIX) 40 MG EC tablet Take 1 tablet by mouth 2 (Two) Times a Day.      psyllium (METAMUCIL) 58.6 % packet Take 1 packet by mouth Daily As Needed (constipation).      Rimegepant Sulfate (Nurtec) 75 MG tablet dispersible tablet Take 1 tablet by mouth As Needed (Migraine). 8 tablet 5    rosuvastatin (CRESTOR) 20 MG tablet Take 1 tablet by mouth Every Night.      sacubitril-valsartan (Entresto)  MG tablet Take 1 tablet by mouth 2 (Two) Times a Day.      Semaglutide,0.25 or 0.5MG/DOS, (Ozempic, 0.25 or 0.5 MG/DOSE,) 2 MG/1.5ML solution pen-injector Inject  under the skin into the appropriate area as directed 1 (One) Time Per Week.      spironolactone (ALDACTONE) 50 MG tablet Take 1 tablet by mouth Daily.       No current facility-administered medications for this visit.     Current outpatient and discharge medications have been reconciled for the patient.  Reviewed by: TEENA Aaron    Past Medical History:  Past Medical History:   Diagnosis Date    Arthritis     Asthma     Cancer     skin    Carotid disease, bilateral     Cellulitis     right foot - on antibiotics 6-    Chest pain     Chronic diastolic congestive heart failure 09/07/2019    Chronic sinusitis     [place]ib[place] bullosa     Coronary artery disease involving native coronary artery of native heart with unstable angina pectoris 01/17/2017    Deviated septum     Diabetes mellitus     Difficulty urinating     Diverticulitis     Enlarged prostate     Fatty liver     GERD (gastroesophageal reflux disease)     [place] (hard of hearing)     Does have hearing aids    Hyperlipidemia LDL goal <70 02/02/2017    Hypertrophy of nasal turbinates     Keratoderma     Kidney stone     Lung nodules     lower right lung    Migraine     Murmur, heart     Myocardial infarction     Obesity     Paroxysmal atrial fibrillation 07/11/2019    Personal history of COVID-19 07/2021    PONV (postoperative  nausea and vomiting)     Primary hypertension 10/16/2016    Psoriasis     Seizures     Sinus congestion     Skin cancer     Sleep apnea     not using cpap    SOB (shortness of breath)     Stroke     mild weakness on right side    UTI (urinary tract infection)      Past Surgical History:   Procedure Laterality Date    CARDIAC CATHETERIZATION  01/2016    Dr. Broadbent; widely patent previously placed stents in the left anterior descending and obstructive disease involving the diagonal branch which was treated medically    CARDIAC CATHETERIZATION N/A 07/14/2017    Procedure: Left Heart Cath;  Surgeon: Wade Ramey MD;  Location:  [facility];  Service:     CARDIAC CATHETERIZATION Left 10/15/2018    Procedure: Cardiac Catheterization/Vascular Study;  Surgeon: Wade Ramey MD;  Location:  [facility];  Service: Cardiology    CARDIAC CATHETERIZATION  10/15/2018    Procedure: Functional Flow [place];  Surgeon: Wade Ramey MD;  Location:  [facility];  Service: Cardiology    CARDIAC CATHETERIZATION N/A 10/15/2018    Procedure: Left ventriculography;  Surgeon: Wade Ramey MD;  Location:  [facility];  Service: Cardiology    CARDIAC CATHETERIZATION Left 06/26/2019    Procedure: Cardiac Catheterization/Vascular Study VEL OK  HE WILL WAIT 1 YEAR FOR SHOULDER SURGERY ;  Surgeon: Wade Ramey MD;  Location:  [facility];  Service: Cardiology    CARDIAC CATHETERIZATION Left 04/30/2021    Procedure: Coronary angiography;  Surgeon: Sahil Llamas MD;  Location:  [facility];  Service: Cardiology;  Laterality: Left;    CARDIAC CATHETERIZATION N/A 04/30/2021    Procedure: Percutaneous Coronary Intervention;  Surgeon: Sahil Llamas MD;  Location:  [facility];  Service: Cardiology;  Laterality: N/A;    CARDIAC CATHETERIZATION N/A 11/09/2022    Procedure: Left Heart Cath with SVGs;  Surgeon: Wade Ramey MD;   Location: [facility] CATH INVASIVE LOCATION;  Service: Cardiology;  Laterality: N/A;    CHOLECYSTECTOMY      CHOLECYSTECTOMY WITH INTRAOPERATIVE CHOLANGIOGRAM N/A 08/01/2018    Procedure: CHOLECYSTECTOMY LAPAROSCOPIC INTRAOPERATIVE CHOLANGIOGRAM;  Surgeon: Shane Ann MD;  Location: [facility] OR;  Service: General    COLONOSCOPY N/A 07/14/2020    Procedure: COLONOSCOPY WITH ANESTHESIA;  Surgeon: Anupam Morales DO;  Location: [facility] ENDOSCOPY;  Service: Gastroenterology;  Laterality: N/A;  pre: abdominal pain  post: diverticulosis  Vinayak Correia PA    CORONARY ANGIOPLASTY      CORONARY ARTERY BYPASS GRAFT N/A 07/06/2019    Procedure: CABG X2 WITH LIMA, LEFT LEG OVH, AND PLACEMENT OF LEFT FEMORAL ARTERIAL LINE;  Surgeon: Steven Tang MD;  Location: [facility] OR;  Service: Cardiothoracic    CORONARY STENT PLACEMENT      x 6    CYSTOSCOPY TRANSURETHRAL RESECTION OF PROSTATE N/A 01/23/2023    Procedure: CYSTOSCOPY TRANSURETHRAL RESECTION OF PROSTATE;  Surgeon: Latrell Pope MD;  Location: [facility] OR;  Service: Urology;  Laterality: N/A;    ENDOSCOPIC FUNCTIONAL SINUS SURGERY (FESS) Bilateral 12/13/2017    Procedure: PROCEDURE PERFORMED:  Bilateral functional endoscopic anterior ethmoidectomy with bilateral middle meatal antrostomy Septoplasty Right kathia bullosa resection Bilateral inferior turbinate reduction via Coblation;  Surgeon: Mayank Ibarra MD;  Location: [facility] OR;  Service:     ENDOSCOPY N/A 07/30/2018    Procedure: ESOPHAGOGASTRODUODENOSCOPY WITH ANESTHESIA;  Surgeon: Benitez Mas MD;  Location: [facility] ENDOSCOPY;  Service: Gastroenterology    ENDOSCOPY N/A 07/14/2020    Procedure: ESOPHAGOGASTRODUODENOSCOPY WITH ANESTHESIA;  Surgeon: Anupam Morales DO;  Location: [facility] ENDOSCOPY;  Service: Gastroenterology;  Laterality: N/A;  pre: abdominal pain  post: esophagitis  Vinayak Correia PA    HERNIA REPAIR      x2 inguinal area    KIDNEY STONE SURGERY      KNEE ARTHROSCOPY  "Right 2022    Procedure: RIGHT KNEE PARTIAL LATERAL MENISCECTOMY;  Surgeon: Pedro Pablo Song MD;  Location:  [facility] OR;  Service: Orthopedics;  Laterality: Right;    KNEE SURGERY Right     OTHER SURGICAL HISTORY      urolift    PROSTATE SURGERY      Dr. Badillo - 2017    PULMONARY ARTERY PRESSURE SENSOR IMPLANT N/A 2023    Procedure: PA Pressure Sensor Implant;  Surgeon: Wade Ramey MD;  Location:  [facility]ATH [facility];  Service: Cardiology;  Laterality: N/A;    ROTATOR CUFF REPAIR Right     SLEEP ENDOSCOPY N/A 2023    Procedure: Videosleep endoscopy;  Surgeon: Mayank Ibarra MD;  Location:  [facility] OR;  Service: ENT;  Laterality: N/A;    THUMB AMPUTATION Left     partial    TOE SURGERY      great toe     Family History   Problem Relation Age of Onset    Heart disease Father     COPD Mother     Hypertension Mother     Asthma Mother     No Known Problems Sister     Colon cancer Paternal Uncle     Prostate cancer Maternal Grandfather     No Known Problems Sister     Colon cancer Maternal Grandmother     Colon polyps Maternal Grandmother      Social History     Tobacco Use    Smoking status: Former     Packs/day: 1.50     Years: 18.00     Pack years: 27.00     Types: Cigarettes, Cigars     Start date:      Quit date:      Years since quittin.6     Passive exposure: Past    Smokeless tobacco: Former     Types: Chew     Quit date:    Vaping Use    Vaping Use: Never used   Substance Use Topics    Alcohol use: No     Comment: 0    Drug use: No     Review of Systems   Constitutional:  Negative for activity change and fatigue.   Eyes:  Positive for photophobia (with headache).   Gastrointestinal:  Positive for nausea (with headache).   Musculoskeletal:  Negative for gait problem.   Neurological:  Positive for headache. Negative for seizures and facial asymmetry.       Objective   Vital Signs:         23  0840   Weight: 113 kg (250 lb)     182.9 cm (72\")  Body mass " index is 33.91 kg/mý.    Physical Exam  Vitals reviewed.   Constitutional:       Appearance: Normal appearance.   HENT:      Head: Normocephalic.      Mouth/Throat:      Pharynx: Oropharynx is clear.   Eyes:      General: Lids are normal.      Extraocular Movements: Extraocular movements intact.      Pupils: Pupils are equal, round, and reactive to light.   Cardiovascular:      Rate and Rhythm: Normal rate and regular rhythm.      Pulses: Normal pulses.   Pulmonary:      Effort: Pulmonary effort is normal.   Musculoskeletal:         General: Normal range of motion.      Cervical back: Normal range of motion and neck supple.   Feet:      Right foot:      Skin integrity: Ulcer present.   Skin:     General: Skin is warm and dry.      Capillary Refill: Capillary refill takes less than 2 seconds.   Neurological:      Coordination: Coordination is intact.      Deep Tendon Reflexes: Reflexes are normal and symmetric.   Psychiatric:         Mood and Affect: Mood normal.         Speech: Speech normal.     Neurological Exam  Mental Status  Awake, alert and oriented to person, place and time. Recent and remote memory are intact. Speech is normal. Language is fluent with no aphasia. Attention and concentration are normal.    Cranial Nerves  CN II: Visual acuity is normal. Visual fields full to confrontation.  CN III, IV, VI: Extraocular movements intact bilaterally. Normal lids and orbits bilaterally. Pupils equal round and reactive to light bilaterally.  CN V: Facial sensation is normal.  CN VII: Full and symmetric facial movement.  CN IX, X: Palate elevates symmetrically. Normal gag reflex.  CN XI: Shoulder shrug strength is normal.  CN XII: Tongue midline without atrophy or fasciculations.    Motor   Strength is 5/5 in all four extremities except as noted.                                             Right                     Left  Hip flexion                              4+                          4+  Hip extension                          4+                          4+    Sensory  Light touch abnormality: Decreased light tough bilaterally below ankles. Pinprick abnormality: Decreased pinpoint bilaterally in feet below ankles. Vibration abnormality: Decreased vibration sensation bilaterally. Proprioception abnormality: Some loss in proprioception bilaterally.     Reflexes  Deep tendon reflexes are 2+ and symmetric in all four extremities.    Coordination    Finger-to-nose, rapid alternating movements and heel-to-shin normal bilaterally without dysmetria.    Gait  Normal casual, toe, heel and tandem gait.    Results Review:    Lab Results   Component Value Date    GLUCOSE 147 (H) 06/28/2023    BUN 24 (H) 06/28/2023    CREATININE 1.70 (H) 07/18/2023    EGFRIFNONA 75 01/21/2022    BCR 20.5 06/28/2023    K 4.4 06/28/2023    CO2 21.0 (L) 06/28/2023    CALCIUM 9.4 06/28/2023    PROTENTOTREF 6.3 02/16/2015    ALBUMIN 4.1 06/28/2023    LABIL2 1.4 02/16/2015    AST 17 06/28/2023    ALT 20 06/28/2023     Lab Results   Component Value Date    WBC 5.01 06/28/2023    HGB 13.7 06/28/2023    HCT 43.2 06/28/2023    MCV 93.7 06/28/2023     (L) 06/28/2023     Lab Results   Component Value Date    CHOL 105 04/18/2023    CHLPL 143 01/07/2016    TRIG 163 (H) 04/18/2023    HDL 29 (L) 04/18/2023    LDL 48 04/18/2023     Lab Results   Component Value Date    TSH 1.700 10/25/2019     Lab Results   Component Value Date    HGBA1C 7.10 (H) 04/18/2023     Lab Results   Component Value Date    FOLATE 8.83 11/29/2022     Lab Results   Component Value Date    VVTISWEV21 609 11/29/2022        Plan .  Assessment:  Carlos A Boyer  is a 63 y.o. male who presents with seizure, neuropathy and migraine.  Overall, he is doing very well with his current treatments.  He remains seizure-free on Lamictal and Keppra and continues to take medications as prescribed.  He denies any medication dose misses or being sick.  He does continue with seizure precautions.  I have no  recommendations for changes with regard to his seizures today.  He continues to have minimal migraines at 1/month.  Nurtec is successful at helping abort these.  However, he has [facility]  This medication is the most appropriate as he is in eligible for triptan secondary to his history of stroke.  We will resend this medication today and work on authorization.  He continues with numbness at and below his ankles with decreased sensations but denies any significant pains as his gabapentin is doing very well for him.  I have discussed the importance of foot care with him as he does have an ulcer on his right foot.  He should continue with podiatry for this.  Otherwise safety precautions were discussed and he should continue all current treatments.  He is understanding and agreeable with this today.    Plan:  Continue Nurtec 75mg as needed  Continue Lamictal 200mg BID  Continue Keppra 1500mg in the am and 2000mg in the pm  Continue Gabapentin 300mg nightly.  Continue with podiatry  Foot care recommended  Safety discussed  Call with concerns    The patient and I have discussed the plan of care and he is in full agreement at this time.     Follow-Up:  No follow-ups on file.       TEENA Aaron  08/22/23  08:41 CDT

## 2023-09-05 ENCOUNTER — LAB (OUTPATIENT)
Dept: UROLOGY | Facility: CLINIC | Age: 63
End: 2023-09-05
Payer: OTHER GOVERNMENT

## 2023-09-05 DIAGNOSIS — R35.0 URINE FREQUENCY: ICD-10-CM

## 2023-09-06 LAB — PSA SERPL-MCNC: 0.2 NG/ML (ref 0–4)

## 2023-09-19 NOTE — PROGRESS NOTES
Subjective    Mr. Boyer is 63 y.o. male    Chief Complaint: BPH    History of Present Illness  Patient is a 63-year-old gentleman who presents for 6-month follow-up he has a history of BPH with worsening lower urinary tract symptoms after previous UroLift he underwent TURP on 01/23/2023.  Has had excellent response however last few months he has had weaker stream, hesitancy and some frequency.  His TURP chip pathology was benign.  Today is 0.2 is not on any urologic medications at this time.  Lab Results   Component Value Date    PSA 0.202 09/05/2023    PSA 0.563 05/04/2017       The following portions of the patient's history were reviewed and updated as appropriate: allergies, current medications, past family history, past medical history, past social history, past surgical history and problem list.    Review of Systems   Genitourinary:  Positive for difficulty urinating and frequency.       Current Outpatient Medications:     acetaminophen (TYLENOL) 325 MG tablet, Take 1 tablet by mouth Every 6 (Six) Hours As Needed for Mild Pain., Disp: , Rfl:     albuterol (PROVENTIL HFA;VENTOLIN HFA) 108 (90 BASE) MCG/ACT inhaler, Inhale 2 puffs Every 6 (Six) Hours As Needed for Wheezing., Disp: , Rfl:     apixaban (ELIQUIS) 5 MG tablet tablet, Take 1 tablet by mouth 2 (Two) Times a Day., Disp: , Rfl:     Aspirin 81 MG capsule, Take 81 mg by mouth Daily. Dr. Ramey or Dr. Tang, Disp: , Rfl:     calcium polycarbophil (FIBERCON) 625 MG tablet, Take 1 tablet by mouth Daily As Needed., Disp: , Rfl:     carboxymethylcellulose (REFRESH PLUS) 0.5 % solution, Administer 1 drop to both eyes 4 (Four) Times a Day As Needed for Dry Eyes., Disp: , Rfl:     docusate sodium (COLACE) 100 MG capsule, Take 1 capsule by mouth 2 (Two) Times a Day for 5 days., Disp: 10 capsule, Rfl: 0    empagliflozin (JARDIANCE) 10 MG tablet tablet, Take 1 tablet by mouth Daily., Disp: , Rfl:     ezetimibe (ZETIA) 10 MG tablet, Take 1 tablet by mouth Daily.,  Disp: , Rfl:     fluticasone (FLONASE) 50 MCG/ACT nasal spray, 2 sprays into the nostril(s) as directed by provider Daily As Needed for Rhinitis or Allergies., Disp: , Rfl:     furosemide (Lasix) 40 MG tablet, Take 1 tablet by mouth Daily As Needed (take as needed for weight gain more than 2 pounds in a day or more than than 3 pounds in 2 days.)., Disp: 90 tablet, Rfl: 1    gabapentin (NEURONTIN) 300 MG capsule, Take 1 capsule by mouth Every Night., Disp: , Rfl:     glycerin adult 2 g suppository, Insert 1 suppository into the rectum As Needed for Constipation for up to 5 days. If no BM with other measures, Disp: 12 each, Rfl: 0    guaiFENesin (MUCINEX) 600 MG 12 hr tablet, Take 2 tablets by mouth Daily As Needed for Congestion., Disp: , Rfl:     insulin aspart (novoLOG FLEXPEN) 100 UNIT/ML solution pen-injector sc pen, Inject 40 Units under the skin into the appropriate area as directed 3 (Three) Times a Day With Meals. Can use 5 to 6 more units if needed in early evening., Disp: , Rfl:     Insulin Detemir (LEVEMIR FLEXPEN SC), Inject  under the skin into the appropriate area as directed. 100 at night 110 in the morning, Disp: , Rfl:     isosorbide mononitrate (IMDUR) 120 MG 24 hr tablet, Take 1 tablet by mouth Daily., Disp: 90 tablet, Rfl: 3    lamoTRIgine (LaMICtal) 200 MG tablet, Take 1 tablet by mouth 2 (Two) Times a Day., Disp: 180 tablet, Rfl: 1    levETIRAcetam (KEPPRA) 500 MG tablet, Take 3 tablets by mouth Every Morning., Disp: 90 tablet, Rfl: 5    levETIRAcetam (KEPPRA) 500 MG tablet, Take 4 tablets by mouth Every Night., Disp: 120 tablet, Rfl: 5    lidocaine (XYLOCAINE) 2 % jelly, Apply  topically to the appropriate area as directed 2 (Two) Times a Day As Needed for Mild Pain or Moderate Pain for up to 8 days., Disp: 28.33 g, Rfl: 0    magnesium hydroxide (Milk of Magnesia) 400 MG/5ML suspension, Take 15 mL by mouth Daily for 5 days. Hold for diarrhea, Disp: 118 mL, Rfl: 0    metFORMIN (GLUCOPHAGE) 1000  MG tablet, Take 1 tablet by mouth 2 (Two) Times a Day With Meals., Disp: , Rfl:     metoprolol tartrate (LOPRESSOR) 100 MG tablet, Take 1 tablet by mouth 2 (Two) Times a Day. Told to take the DOS-2/27/23, w/ sip of water., Disp: , Rfl:     Multiple Vitamin (MULTI VITAMIN PO), Take 1 tablet by mouth Daily., Disp: , Rfl:     nitroglycerin (NITROSTAT) 0.4 MG SL tablet, Place 1 tablet under the tongue Every 5 (Five) Minutes As Needed for Chest Pain. Take no more than 3 doses in 15 minutes., Disp: , Rfl:     pantoprazole (PROTONIX) 40 MG EC tablet, Take 1 tablet by mouth 2 (Two) Times a Day., Disp: , Rfl:     polyethylene glycol (MIRALAX) 17 GM/SCOOP powder, Take two scoops twice a day until bowel movements normalize, Disp: 578 g, Rfl: 0    psyllium (METAMUCIL) 58.6 % packet, Take 1 packet by mouth Daily As Needed (constipation)., Disp: , Rfl:     Rimegepant Sulfate (Nurtec) 75 MG tablet dispersible tablet, Take 1 tablet by mouth As Needed (Migraine)., Disp: 8 tablet, Rfl: 5    rosuvastatin (CRESTOR) 20 MG tablet, Take 1 tablet by mouth Every Night., Disp: , Rfl:     sacubitril-valsartan (Entresto)  MG tablet, Take 1 tablet by mouth 2 (Two) Times a Day., Disp: , Rfl:     Semaglutide,0.25 or 0.5MG/DOS, (Ozempic, 0.25 or 0.5 MG/DOSE,) 2 MG/1.5ML solution pen-injector, Inject  under the skin into the appropriate area as directed 1 (One) Time Per Week., Disp: , Rfl:     spironolactone (ALDACTONE) 50 MG tablet, Take 1 tablet by mouth Daily., Disp: , Rfl:     tamsulosin (FLOMAX) 0.4 MG capsule 24 hr capsule, Take 1 capsule by mouth Every Night for 90 days., Disp: 30 capsule, Rfl: 2    Past Medical History:   Diagnosis Date    Arthritis     Asthma     Cancer     skin    Carotid disease, bilateral     Cellulitis     right foot - on antibiotics 6-    Chest pain     Chronic diastolic congestive heart failure 09/07/2019    Chronic sinusitis     Maribell bullosa     Coronary artery disease involving native coronary artery  of native heart with unstable angina pectoris 01/17/2017    Deviated septum     Diabetes mellitus     Difficulty urinating     Diverticulitis     Enlarged prostate     Fatty liver     GERD (gastroesophageal reflux disease)     [place] (hard of hearing)     Does have hearing aids    Hyperlipidemia LDL goal <70 02/02/2017    Hypertrophy of nasal turbinates     Keratoderma     Kidney stone     Lung nodules     lower right lung    Migraine     Murmur, heart     Myocardial infarction     Obesity     Paroxysmal atrial fibrillation 07/11/2019    Personal history of COVID-19 07/2021    PONV (postoperative nausea and vomiting)     Primary hypertension 10/16/2016    Psoriasis     Seizures     Sinus congestion     Skin cancer     Sleep apnea     not using cpap    SOB (shortness of breath)     Stroke     mild weakness on right side    UTI (urinary tract infection)        Past Surgical History:   Procedure Laterality Date    CARDIAC CATHETERIZATION  01/2016    Dr. Broadbent; widely patent previously placed stents in the left anterior descending and obstructive disease involving the diagonal branch which was treated medically    CARDIAC CATHETERIZATION N/A 07/14/2017    Procedure: Left Heart Cath;  Surgeon: Wade Ramey MD;  Location:  [facility];  Service:     CARDIAC CATHETERIZATION Left 10/15/2018    Procedure: Cardiac Catheterization/Vascular Study;  Surgeon: Wade Ramey MD;  Location:  [facility];  Service: Cardiology    CARDIAC CATHETERIZATION  10/15/2018    Procedure: Functional Flow [place];  Surgeon: Wade Ramey MD;  Location:  [facility];  Service: Cardiology    CARDIAC CATHETERIZATION N/A 10/15/2018    Procedure: Left ventriculography;  Surgeon: Wade Ramey MD;  Location:  [facility];  Service: Cardiology    CARDIAC CATHETERIZATION Left 06/26/2019    Procedure: Cardiac Catheterization/Vascular Study VEL OK  HE WILL WAIT 1 YEAR FOR SHOULDER  SURGERY ;  Surgeon: Wade Ramey MD;  Location:  [facility];  Service: Cardiology    CARDIAC CATHETERIZATION Left 04/30/2021    Procedure: Coronary angiography;  Surgeon: Sahil Llamas MD;  Location:  [facility];  Service: Cardiology;  Laterality: Left;    CARDIAC CATHETERIZATION N/A 04/30/2021    Procedure: Percutaneous Coronary Intervention;  Surgeon: Sahil Llamas MD;  Location:  [facility];  Service: Cardiology;  Laterality: N/A;    CARDIAC CATHETERIZATION N/A 11/09/2022    Procedure: Left Heart Cath with SVGs;  Surgeon: Wade Ramey MD;  Location:  [facility];  Service: Cardiology;  Laterality: N/A;    CHOLECYSTECTOMY      CHOLECYSTECTOMY WITH INTRAOPERATIVE CHOLANGIOGRAM N/A 08/01/2018    Procedure: CHOLECYSTECTOMY LAPAROSCOPIC INTRAOPERATIVE CHOLANGIOGRAM;  Surgeon: Shane Ann MD;  Location: [facility] OR;  Service: General    COLONOSCOPY N/A 07/14/2020    Procedure: COLONOSCOPY WITH ANESTHESIA;  Surgeon: Anupam Morales DO;  Location: [facility] ENDOSCOPY;  Service: Gastroenterology;  Laterality: N/A;  pre: abdominal pain  post: diverticulosis  Vinayak Correia PA    CORONARY ANGIOPLASTY      CORONARY ARTERY BYPASS GRAFT N/A 07/06/2019    Procedure: CABG X2 WITH LIMA, LEFT LEG OVH, AND PLACEMENT OF LEFT FEMORAL ARTERIAL LINE;  Surgeon: Steven Tang MD;  Location: [facility] OR;  Service: Cardiothoracic    CORONARY STENT PLACEMENT      x 6    CYSTOSCOPY TRANSURETHRAL RESECTION OF PROSTATE N/A 01/23/2023    Procedure: CYSTOSCOPY TRANSURETHRAL RESECTION OF PROSTATE;  Surgeon: Latrell Pope MD;  Location: [facility] OR;  Service: Urology;  Laterality: N/A;    ENDOSCOPIC FUNCTIONAL SINUS SURGERY (FESS) Bilateral 12/13/2017    Procedure: PROCEDURE PERFORMED:  Bilateral functional endoscopic anterior ethmoidectomy with bilateral middle meatal antrostomy Septoplasty Right kathia bullosa resection Bilateral inferior turbinate  reduction via Cob[place];  Surgeon: Mayank Ibarra MD;  Location:  [facility] OR;  Service:     ENDOSCOPY N/A 2018    Procedure: ESOPHAGOGASTRODUODENOSCOPY WITH ANESTHESIA;  Surgeon: Benitez Mas MD;  Location:  [facility] ENDOSCOPY;  Service: Gastroenterology    ENDOSCOPY N/A 2020    Procedure: ESOPHAGOGASTRODUODENOSCOPY WITH ANESTHESIA;  Surgeon: Anupam Morales DO;  Location:  [facility] ENDOS[facility];  Service: Gastroenterology;  Laterality: N/A;  pre: abdominal pain  post: esophagitis  Vinayak Correia, PA    HERNIA REPAIR      x2 inguinal area    KIDNEY STONE SURGERY      KNEE ARTHROSCOPY Right 2022    Procedure: RIGHT KNEE PARTIAL LATERAL MENISCECTOMY;  Surgeon: Pedro Pablo Song MD;  Location:  [facility] OR;  Service: Orthopedics;  Laterality: Right;    KNEE SURGERY Right     OTHER SURGICAL HISTORY      urolift    PROSTATE SURGERY      Dr. Badillo -     PULMONARY ARTERY PRESSURE SENSOR IMPLANT N/A 2023    Procedure: PA Pressure Sensor Implant;  Surgeon: Wade Ramey MD;  Location: [facility] CATH INVASIVE [facility];  Service: Cardiology;  Laterality: N/A;    ROTATOR CUFF REPAIR Right     SLEEP ENDOSCOPY N/A 2023    Procedure: Videosleep endoscopy;  Surgeon: Mayank Ibarra MD;  Location:  [facility] OR;  Service: ENT;  Laterality: N/A;    THUMB AMPUTATION Left     partial    TOE SURGERY      great toe       Social History     Socioeconomic History    Marital status:    Tobacco Use    Smoking status: Former     Packs/day: 1.50     Years: 18.00     Pack years: 27.00     Types: Cigarettes, Cigars     Start date:      Quit date:      Years since quittin.7     Passive exposure: Past    Smokeless tobacco: Former     Types: Chew     Quit date:    Vaping Use    Vaping Use: Never used   Substance and Sexual Activity    Alcohol use: No     Comment: 0    Drug use: No    Sexual activity: Defer       Family History   Problem Relation Age of Onset    Heart disease Father   "   COPD Mother     Hypertension Mother     Asthma Mother     No Known Problems Sister     Colon cancer Paternal Uncle     Prostate cancer Maternal Grandfather     No Known Problems Sister     Colon cancer Maternal Grandmother     Colon polyps Maternal Grandmother        Objective    Temp 97 °F (36.1 °C)   Ht 182.9 cm (72\")   Wt 109 kg (241 lb)   BMI 32.69 kg/m²     Physical Exam  Vitals reviewed.   Constitutional:       Appearance: Normal appearance.   HENT:      Head: Normocephalic and atraumatic.   Pulmonary:      Effort: Pulmonary effort is normal.   Skin:     Coloration: Skin is not pale.   Neurological:      Mental Status: He is alert and oriented to person, place, and time.   Psychiatric:         Mood and Affect: Mood normal.         Behavior: Behavior normal.           Results for orders placed or performed during the hospital encounter of 09/21/23   Basic Metabolic Panel    Specimen: Blood   Result Value Ref Range    Glucose 116 (H) 65 - 99 mg/dL    BUN 17 8 - 23 mg/dL    Creatinine 0.94 0.76 - 1.27 mg/dL    Sodium 140 136 - 145 mmol/L    Potassium 3.7 3.5 - 5.2 mmol/L    Chloride 108 (H) 98 - 107 mmol/L    CO2 22.0 22.0 - 29.0 mmol/L    Calcium 8.9 8.6 - 10.5 mg/dL    BUN/Creatinine Ratio 18.1 7.0 - 25.0    Anion Gap 10.0 5.0 - 15.0 mmol/L    eGFR 90.0 >60.0 mL/min/1.73   Magnesium    Specimen: Blood   Result Value Ref Range    Magnesium 2.0 1.6 - 2.4 mg/dL   Single High Sensitivity Troponin T    Specimen: Blood   Result Value Ref Range    HS Troponin T 14 <15 ng/L   CBC Auto Differential    Specimen: Blood   Result Value Ref Range    WBC 7.76 3.40 - 10.80 10*3/mm3    RBC 4.54 4.14 - 5.80 10*6/mm3    Hemoglobin 13.4 13.0 - 17.7 g/dL    Hematocrit 41.9 37.5 - 51.0 %    MCV 92.3 79.0 - 97.0 fL    MCH 29.5 26.6 - 33.0 pg    MCHC 32.0 31.5 - 35.7 g/dL    RDW 12.9 12.3 - 15.4 %    RDW-SD 43.8 37.0 - 54.0 fl    MPV 11.2 6.0 - 12.0 fL    Platelets 132 (L) 140 - 450 10*3/mm3    Neutrophil % 68.6 42.7 - 76.0 %    " Lymphocyte % 21.3 19.6 - 45.3 %    Monocyte % 7.7 5.0 - 12.0 %    Eosinophil % 1.8 0.3 - 6.2 %    Basophil % 0.3 0.0 - 1.5 %    Neutrophils, Absolute 5.33 1.70 - 7.00 10*3/mm3    Lymphocytes, Absolute 1.65 0.70 - 3.10 10*3/mm3    Monocytes, Absolute 0.60 0.10 - 0.90 10*3/mm3    Eosinophils, Absolute 0.14 0.00 - 0.40 10*3/mm3    Basophils, Absolute 0.02 0.00 - 0.20 10*3/mm3   ECG 12 Lead Syncope   Result Value Ref Range    QT Interval 398 ms    QTC Interval 450 ms     IPSS Questionnaire (AUA-7):  Incomplete emptying  Over the past month, how often have you had a sensation of not emptying your bladder completely after you finish?: Less than half the time (09/25/23 0826)  Frequency  Over the past month, how often have you had to urinate again less than two hours after you finishing urinating ?: More than half the time (09/25/23 0826)  Intermittency  Over the past month, how often have you found you stopped and started again several time when you urinated ?: Less than half the time (09/25/23 0826)  Urgency  Over the last month, how difficult  have you found it to postpone urination ?: About half the time (09/25/23 0826)  Weak Stream  Over the past month, how often have you had a weak urinary stream ?: About half the time (09/25/23 0826)  Straining  Over the past month, how often have you had to push or strain to begin urination ?: Less than half the time (09/25/23 0826)  Nocturia  Over the past month, how many times did you most typically get up to urinate from the time you went to bed until the time you got up in the morning ?: 3 times (09/25/23 0826)  Quality of life due to urinary symptoms  If you were to spend the rest of your life with your urinary condition the way it is now, how would feel about that?: Mostly dissatified (09/25/23 0826)        Assessment and Plan    Diagnoses and all orders for this visit:    1. BPH with obstruction/lower urinary tract symptoms (Primary)  -     tamsulosin (FLOMAX) 0.4 MG capsule  24 hr capsule; Take 1 capsule by mouth Every Night for 90 days.  Dispense: 30 capsule; Refill: 2    Patient had TURP 01/20/2023 with excellent response he has not been on any medication since.  However he has had worsening stream hesitancy and frequency.  After discussion started him on tamsulosin 1 daily.  I would like him to return in 3 months to assess symptoms at that time.

## 2023-09-21 ENCOUNTER — HOSPITAL ENCOUNTER (EMERGENCY)
Facility: HOSPITAL | Age: 63
Discharge: HOME | End: 2023-09-21
Attending: STUDENT IN AN ORGANIZED HEALTH CARE EDUCATION/TRAINING PROGRAM
Payer: MEDICARE

## 2023-09-21 ENCOUNTER — APPOINTMENT (OUTPATIENT)
Dept: CT IMAGING | Facility: HOSPITAL | Age: 63
End: 2023-09-21
Payer: MEDICARE

## 2023-09-21 ENCOUNTER — APPOINTMENT (OUTPATIENT)
Dept: GENERAL RADIOLOGY | Facility: HOSPITAL | Age: 63
End: 2023-09-21
Payer: MEDICARE

## 2023-09-21 VITALS
DIASTOLIC BLOOD PRESSURE: 70 MMHG | RESPIRATION RATE: 18 BRPM | HEIGHT: 72 IN | WEIGHT: 244 LBS | TEMPERATURE: 97.4 F | BODY MASS INDEX: 33.05 KG/M2 | SYSTOLIC BLOOD PRESSURE: 124 MMHG | HEART RATE: 77 BPM | OXYGEN SATURATION: 99 %

## 2023-09-21 DIAGNOSIS — R55 SYNCOPE, UNSPECIFIED SYNCOPE TYPE: Primary | ICD-10-CM

## 2023-09-21 LAB
ANION GAP SERPL CALCULATED.3IONS-SCNC: 10 MMOL/L (ref 5–15)
BASOPHILS # BLD AUTO: 0.02 10*3/MM3 (ref 0–0.2)
BASOPHILS NFR BLD AUTO: 0.3 % (ref 0–1.5)
BUN SERPL-MCNC: 17 MG/DL (ref 8–23)
BUN/CREAT SERPL: 18.1 (ref 7–25)
CALCIUM SPEC-SCNC: 8.9 MG/DL (ref 8.6–10.5)
CHLORIDE SERPL-SCNC: 108 MMOL/L (ref 98–107)
CO2 SERPL-SCNC: 22 MMOL/L (ref 22–29)
CREAT SERPL-MCNC: 0.94 MG/DL (ref 0.76–1.27)
DEPRECATED RDW RBC AUTO: 43.8 FL (ref 37–54)
EGFRCR SERPLBLD CKD-EPI 2021: 90 ML/MIN/1.73
EOSINOPHIL # BLD AUTO: 0.14 10*3/MM3 (ref 0–0.4)
EOSINOPHIL NFR BLD AUTO: 1.8 % (ref 0.3–6.2)
ERYTHROCYTE [DISTWIDTH] IN BLOOD BY AUTOMATED COUNT: 12.9 % (ref 12.3–15.4)
GLUCOSE SERPL-MCNC: 116 MG/DL (ref 65–99)
HCT VFR BLD AUTO: 41.9 % (ref 37.5–51)
HGB BLD-MCNC: 13.4 G/DL (ref 13–17.7)
LYMPHOCYTES # BLD AUTO: 1.65 10*3/MM3 (ref 0.7–3.1)
LYMPHOCYTES NFR BLD AUTO: 21.3 % (ref 19.6–45.3)
MAGNESIUM SERPL-MCNC: 2 MG/DL (ref 1.6–2.4)
MCH RBC QN AUTO: 29.5 PG (ref 26.6–33)
MCHC RBC AUTO-ENTMCNC: 32 G/DL (ref 31.5–35.7)
MCV RBC AUTO: 92.3 FL (ref 79–97)
MONOCYTES # BLD AUTO: 0.6 10*3/MM3 (ref 0.1–0.9)
MONOCYTES NFR BLD AUTO: 7.7 % (ref 5–12)
NEUTROPHILS NFR BLD AUTO: 5.33 10*3/MM3 (ref 1.7–7)
NEUTROPHILS NFR BLD AUTO: 68.6 % (ref 42.7–76)
PLATELET # BLD AUTO: 132 10*3/MM3 (ref 140–450)
PMV BLD AUTO: 11.2 FL (ref 6–12)
POTASSIUM SERPL-SCNC: 3.7 MMOL/L (ref 3.5–5.2)
RBC # BLD AUTO: 4.54 10*6/MM3 (ref 4.14–5.8)
SODIUM SERPL-SCNC: 140 MMOL/L (ref 136–145)
TROPONIN T SERPL HS-MCNC: 14 NG/L
WBC NRBC COR # BLD: 7.76 10*3/MM3 (ref 3.4–10.8)

## 2023-09-21 PROCEDURE — 96375 TX/PRO/DX INJ NEW DRUG ADDON: CPT

## 2023-09-21 PROCEDURE — 25010000002 METOCLOPRAMIDE PER 10 MG: Performed by: STUDENT IN AN ORGANIZED HEALTH CARE EDUCATION/TRAINING PROGRAM

## 2023-09-21 PROCEDURE — 93010 ELECTROCARDIOGRAM REPORT: CPT | Performed by: EMERGENCY MEDICINE

## 2023-09-21 PROCEDURE — 83735 ASSAY OF MAGNESIUM: CPT | Performed by: STUDENT IN AN ORGANIZED HEALTH CARE EDUCATION/TRAINING PROGRAM

## 2023-09-21 PROCEDURE — 84484 ASSAY OF TROPONIN QUANT: CPT | Performed by: STUDENT IN AN ORGANIZED HEALTH CARE EDUCATION/TRAINING PROGRAM

## 2023-09-21 PROCEDURE — 99284 EMERGENCY DEPT VISIT MOD MDM: CPT

## 2023-09-21 PROCEDURE — 96374 THER/PROPH/DIAG INJ IV PUSH: CPT

## 2023-09-21 PROCEDURE — 93005 ELECTROCARDIOGRAM TRACING: CPT

## 2023-09-21 PROCEDURE — 85025 COMPLETE CBC W/AUTO DIFF WBC: CPT | Performed by: STUDENT IN AN ORGANIZED HEALTH CARE EDUCATION/TRAINING PROGRAM

## 2023-09-21 PROCEDURE — 70450 CT HEAD/BRAIN W/O DYE: CPT

## 2023-09-21 PROCEDURE — 72125 CT NECK SPINE W/O DYE: CPT

## 2023-09-21 PROCEDURE — 80048 BASIC METABOLIC PNL TOTAL CA: CPT | Performed by: STUDENT IN AN ORGANIZED HEALTH CARE EDUCATION/TRAINING PROGRAM

## 2023-09-21 PROCEDURE — 25010000002 KETOROLAC TROMETHAMINE PER 15 MG: Performed by: STUDENT IN AN ORGANIZED HEALTH CARE EDUCATION/TRAINING PROGRAM

## 2023-09-21 PROCEDURE — 25010000002 ONDANSETRON PER 1 MG: Performed by: STUDENT IN AN ORGANIZED HEALTH CARE EDUCATION/TRAINING PROGRAM

## 2023-09-21 PROCEDURE — 93005 ELECTROCARDIOGRAM TRACING: CPT | Performed by: STUDENT IN AN ORGANIZED HEALTH CARE EDUCATION/TRAINING PROGRAM

## 2023-09-21 PROCEDURE — 71045 X-RAY EXAM CHEST 1 VIEW: CPT

## 2023-09-21 RX ORDER — POLYETHYLENE GLYCOL 3350 17 G/17G
POWDER, FOR SOLUTION ORAL
Qty: 578 G | Refills: 0 | Status: SHIPPED | OUTPATIENT
Start: 2023-09-21

## 2023-09-21 RX ORDER — DOCUSATE SODIUM 100 MG/1
100 CAPSULE, LIQUID FILLED ORAL 2 TIMES DAILY
Qty: 10 CAPSULE | Refills: 0 | Status: SHIPPED | OUTPATIENT
Start: 2023-09-21 | End: 2023-09-26

## 2023-09-21 RX ORDER — MAG HYDROX/ALUMINUM HYD/SIMETH 400-400-40
1 SUSPENSION, ORAL (FINAL DOSE FORM) ORAL AS NEEDED
Qty: 12 EACH | Refills: 0 | Status: SHIPPED | OUTPATIENT
Start: 2023-09-21 | End: 2023-09-26

## 2023-09-21 RX ORDER — MINERAL OIL 100 G/100G
1 OIL RECTAL ONCE
Qty: 1 EACH | Refills: 0 | Status: SHIPPED | OUTPATIENT
Start: 2023-09-21 | End: 2023-09-21

## 2023-09-21 RX ORDER — METOCLOPRAMIDE HYDROCHLORIDE 5 MG/ML
10 INJECTION INTRAMUSCULAR; INTRAVENOUS ONCE
Status: COMPLETED | OUTPATIENT
Start: 2023-09-21 | End: 2023-09-21

## 2023-09-21 RX ORDER — KETOROLAC TROMETHAMINE 30 MG/ML
30 INJECTION, SOLUTION INTRAMUSCULAR; INTRAVENOUS ONCE
Status: COMPLETED | OUTPATIENT
Start: 2023-09-21 | End: 2023-09-21

## 2023-09-21 RX ORDER — ONDANSETRON 2 MG/ML
4 INJECTION INTRAMUSCULAR; INTRAVENOUS ONCE
Status: COMPLETED | OUTPATIENT
Start: 2023-09-21 | End: 2023-09-21

## 2023-09-21 RX ADMIN — METOCLOPRAMIDE HYDROCHLORIDE 10 MG: 5 INJECTION INTRAMUSCULAR; INTRAVENOUS at 14:13

## 2023-09-21 RX ADMIN — SODIUM CHLORIDE, POTASSIUM CHLORIDE, SODIUM LACTATE AND CALCIUM CHLORIDE 1000 ML: 600; 310; 30; 20 INJECTION, SOLUTION INTRAVENOUS at 12:30

## 2023-09-21 RX ADMIN — ONDANSETRON 4 MG: 2 INJECTION INTRAMUSCULAR; INTRAVENOUS at 12:30

## 2023-09-21 RX ADMIN — KETOROLAC TROMETHAMINE 30 MG: 30 INJECTION, SOLUTION INTRAMUSCULAR; INTRAVENOUS at 14:12

## 2023-09-21 NOTE — DISCHARGE INSTRUCTIONS
"Please take the constipation medicine as prescribed.  Come back for any episodes of passing out or low blood sugar episodes that are symptomatic and not resolved with eating and drinking.  Please continue to monitor your blood sugar at home.  Follow-up with your doctor soon as possible regarding your symptoms today.  Use the lidocaine ointment as needed for pain in your anal fissure.    You may start taking two capfuls of miralax twice a day to assist with bowel movements. You may also initiate a \"cleanout\" if you would like according to the following instructions (sourced from [email or web address]).  Get a 64 ounce bottle of any sports drink or water. You will need to mix this with the MiraLAX. Keep it at room temperature.  Mix all 238 grams of the MiraLAX powder with 64 ounces of a room temperature clear liquid until the MiraLAX powder dissolves.  Once the MiraLAX is dissolved, you can put the mixture in the refrigerator. Many people find it tastes better when it's chilled.      You should also take the dulcolax every day to soften your bowel movements. You can use milk of magnesia as needed daily when you cannot have a bowel movement. You can then use a glycerin suppository if the milk of magnesia does not work or you'd prefer to try the suppository. Finally, you can use an enema if the above measures do not work.    How do I get ready to use the enema?    Do not eat for at least 30 minutes before using the enema.    Make sure you can get to a toilet easily.    Find a comfortable place to lie down.    Have a towel to lie on.    Warm the enema in a bowl or sink of warm water.   - Read the instructions on the packet/tube carefully.    Use the enema even if you have diarrhoea or have recently passed a bowel motion    How do I use the enema?   1. Once you have warmed the enema in a bowl or sink of warm water, pull the lid off the nozzle. Hold " the bottle upright so the contents do not spill.   2. The nozzle is already lubricated, but you can put on more lubricating jelly if you like.   3. Lie on your left hand side, on the towel, with your knees bent up toward your chest as far as comfortable.   4. Gently push the nozzle about 7cm (3in) into your anus.   5. Slowly squirt the contents in. Remove the nozzle once you have finished and stay lying down.   6. Try to hold the liquid in your bottom for as long as you can - five minutes, if possible.   7. Go to the toilet when you can no longer hold it and you really feel like emptying your bowels.   8. Stay near the toilet for the next hour.   9. Some people have stomach cramps for a short time after using the enema.   10. Occasionally, you can feel faint or dizzy. If this happens, lie down until you feel better      Please return if you have worsening abdominal pain, lightheadedness, passing out, inability to tolerate food or water, or other emergent concerns. Otherwise please follow-up with your primary care doctor regarding your ER visit today.

## 2023-09-21 NOTE — ED PROVIDER NOTES
Subjective   History of Present Illness  Patient presents due to syncope.  Occurred yesterday.  He was at home playing with his dog outside.  He passed out does not remember much of his preceding sensation but he denies palpitations, shortness of breath, chest pain, or severe headache.  He woke up on the ground in his glucometer said that his glucose was 50.  He ate and drink and recovered.  He stayed home because he felt improved.  He had a mild residual headache from where he hit his head on the ground.  He does take a blood thinner.  He felt that he had recovered from that episode although he had some residual headache today.  [facility] about a 4 to 5-day history of rectal pain which is described as pain in his anus that started after passing a very constipated bowel movement.  He continues to be constipated.  No blood in the stool.  When his doctor learned of his syncopal episode yesterday they told to come to the ER for further management.  His headache is described as a 6 out of 10 general headache.  Not severe.  No visual disturbance.  No numbness or weakness.  He has a history of seizures but has been seizure-free for 3 years and had no postictal confusion, no tongue biting, no urinary incontinence.    Review of Systems   Constitutional:  Negative for chills and fever.   Respiratory:  Negative for cough and shortness of breath.    Cardiovascular:  Negative for chest pain, palpitations and leg swelling.   Gastrointestinal:  Positive for nausea (some currently). Negative for abdominal pain and vomiting.   Genitourinary:  Negative for difficulty urinating and dysuria.   Neurological:  Positive for syncope. Negative for light-headedness.     Past Medical History:   Diagnosis Date    Arthritis     Asthma     Cancer     skin    Carotid disease, bilateral     Cellulitis     right foot - on antibiotics 6-    Chest pain     Chronic diastolic congestive heart failure 09/07/2019    Chronic  sinusitis     Maribell bullosa     Coronary artery disease involving native coronary artery of native heart with unstable angina pectoris 01/17/2017    Deviated septum     Diabetes mellitus     Difficulty urinating     Diverticulitis     Enlarged prostate     Fatty liver     GERD (gastroesophageal reflux disease)     [place] (hard of hearing)     Does have hearing aids    Hyperlipidemia LDL goal <70 02/02/2017    Hypertrophy of nasal turbinates     Keratoderma     Kidney stone     Lung nodules     lower right lung    Migraine     Murmur, heart     Myocardial infarction     Obesity     Paroxysmal atrial fibrillation 07/11/2019    Personal history of COVID-19 07/2021    PONV (postoperative nausea and vomiting)     Primary hypertension 10/16/2016    Psoriasis     Seizures     Sinus congestion     Skin cancer     Sleep apnea     not using cpap    SOB (shortness of breath)     Stroke     mild weakness on right side    UTI (urinary tract infection)        Allergies   Allergen Reactions    Flagyl [Metronidazole] Hives    Atorvastatin Other (See Comments) and Myalgia      - LIPITOR -   Muscle cramps    Ciprofloxacin Hives      - CIPRO -        Past Surgical History:   Procedure Laterality Date    CARDIAC CATHETERIZATION  01/2016    Dr. Broadbent; widely patent previously placed stents in the left anterior descending and obstructive disease involving the diagonal branch which was treated medically    CARDIAC CATHETERIZATION N/A 07/14/2017    Procedure: Left Heart Cath;  Surgeon: Wade Ramey MD;  Location:  [facility];  Service:     CARDIAC CATHETERIZATION Left 10/15/2018    Procedure: Cardiac Catheterization/Vascular Study;  Surgeon: Wade Ramey MD;  Location:  [facility];  Service: Cardiology    CARDIAC CATHETERIZATION  10/15/2018    Procedure: Functional Flow [place];  Surgeon: Wade Ramey MD;  Location:  [facility];  Service: Cardiology    CARDIAC CATHETERIZATION N/A  10/15/2018    Procedure: Left ventriculography;  Surgeon: Wade Ramey MD;  Location:  [facility];  Service: Cardiology    CARDIAC CATHETERIZATION Left 06/26/2019    Procedure: Cardiac Catheterization/Vascular Study VEL OK  HE WILL WAIT 1 YEAR FOR SHOULDER SURGERY ;  Surgeon: Wade Ramey MD;  Location:  [facility];  Service: Cardiology    CARDIAC CATHETERIZATION Left 04/30/2021    Procedure: Coronary angiography;  Surgeon: Sahil Llamas MD;  Location:  [facility];  Service: Cardiology;  Laterality: Left;    CARDIAC CATHETERIZATION N/A 04/30/2021    Procedure: Percutaneous Coronary Intervention;  Surgeon: Sahil Llamas MD;  Location:  [facility];  Service: Cardiology;  Laterality: N/A;    CARDIAC CATHETERIZATION N/A 11/09/2022    Procedure: Left Heart Cath with SVGs;  Surgeon: Wade Ramey MD;  Location:  [facility];  Service: Cardiology;  Laterality: N/A;    CHOLECYSTECTOMY      CHOLECYSTECTOMY WITH INTRAOPERATIVE CHOLANGIOGRAM N/A 08/01/2018    Procedure: CHOLECYSTECTOMY LAPAROSCOPIC INTRAOPERATIVE CHOLANGIOGRAM;  Surgeon: Shane Ann MD;  Location: [facility] OR;  Service: General    COLONOSCOPY N/A 07/14/2020    Procedure: COLONOSCOPY WITH ANESTHESIA;  Surgeon: Anupam Morales DO;  Location: [facility] ENDOSCOPY;  Service: Gastroenterology;  Laterality: N/A;  pre: abdominal pain  post: diverticulosis  Vinayak Correia PA    CORONARY ANGIOPLASTY      CORONARY ARTERY BYPASS GRAFT N/A 07/06/2019    Procedure: CABG X2 WITH LIMA, LEFT LEG OVH, AND PLACEMENT OF LEFT FEMORAL ARTERIAL LINE;  Surgeon: Steven Tang MD;  Location: [facility] OR;  Service: Cardiothoracic    CORONARY STENT PLACEMENT      x 6    CYSTOSCOPY TRANSURETHRAL RESECTION OF PROSTATE N/A 01/23/2023    Procedure: CYSTOSCOPY TRANSURETHRAL RESECTION OF PROSTATE;  Surgeon: Latrell Pope MD;  Location: [facility] OR;  Service: Urology;  Laterality:  N/A;    ENDOSCOPIC FUNCTIONAL SINUS SURGERY (FESS) Bilateral 12/13/2017    Procedure: PROCEDURE PERFORMED:  Bilateral functional endoscopic anterior ethmoidectomy with bilateral middle meatal antrostomy Septoplasty Right kathia bullosa resection Bilateral inferior turbinate reduction via Coblation;  Surgeon: Mayank Ibarra MD;  Location: [facility] OR;  Service:     ENDOSCOPY N/A 07/30/2018    Procedure: ESOPHAGOGASTRODUODENOSCOPY WITH ANESTHESIA;  Surgeon: Benitez Mas MD;  Location: [facility] ENDOSCOPY;  Service: Gastroenterology    ENDOSCOPY N/A 07/14/2020    Procedure: ESOPHAGOGASTRODUODENOSCOPY WITH ANESTHESIA;  Surgeon: Anupam Morales DO;  Location: [facility] ENDOSCOPY;  Service: Gastroenterology;  Laterality: N/A;  pre: abdominal pain  post: esophagitis  Vinayak Correia PA    HERNIA REPAIR      x2 inguinal area    KIDNEY STONE SURGERY      KNEE ARTHROSCOPY Right 03/01/2022    Procedure: RIGHT KNEE PARTIAL LATERAL MENISCECTOMY;  Surgeon: Pedro Pablo Song MD;  Location: [facility] OR;  Service: Orthopedics;  Laterality: Right;    KNEE SURGERY Right     OTHER SURGICAL HISTORY      urolift    PROSTATE SURGERY      Dr. Badillo - 2017    PULMONARY ARTERY PRESSURE SENSOR IMPLANT N/A 05/08/2023    Procedure: PA Pressure Sensor Implant;  Surgeon: Wade Ramey MD;  Location: [facility] CATH INVASIVE LOCATION;  Service: Cardiology;  Laterality: N/A;    ROTATOR CUFF REPAIR Right     SLEEP ENDOSCOPY N/A 02/27/2023    Procedure: Videosleep endoscopy;  Surgeon: Mayank Ibarra MD;  Location: [facility] OR;  Service: ENT;  Laterality: N/A;    THUMB AMPUTATION Left     partial    TOE SURGERY      great toe       Family History   Problem Relation Age of Onset    Heart disease Father     COPD Mother     Hypertension Mother     Asthma Mother     No Known Problems Sister     Colon cancer Paternal Uncle     Prostate cancer Maternal Grandfather     No Known Problems Sister     Colon cancer Maternal Grandmother     Colon  polyps Maternal Grandmother        Social History     Socioeconomic History    Marital status:    Tobacco Use    Smoking status: Former     Packs/day: 1.50     Years: 18.00     Pack years: 27.00     Types: Cigarettes, Cigars     Start date:      Quit date:      Years since quittin.7     Passive exposure: Past    Smokeless tobacco: Former     Types: Chew     Quit date:    Vaping Use    Vaping Use: Never used   Substance and Sexual Activity    Alcohol use: No     Comment: 0    Drug use: No    Sexual activity: Defer           Objective   Physical Exam  Vitals reviewed.   Constitutional:       General: He is not in acute distress.  HENT:      Head: Normocephalic and atraumatic.   Eyes:      Extraocular Movements: Extraocular movements intact.      Conjunctiva/sclera: Conjunctivae normal.   Cardiovascular:      Pulses: Normal pulses.      Heart sounds: Murmur (pt states he has chronic murmur) heard.   Pulmonary:      Effort: Pulmonary effort is normal. No respiratory distress.      Breath sounds: No wheezing.   Abdominal:      General: Abdomen is flat. There is no distension.      Tenderness: There is no abdominal tenderness. There is no guarding.   Genitourinary:     Comments: 12 o'clock anal fissure  Musculoskeletal:      Cervical back: Normal range of motion and neck supple.   Skin:     General: Skin is warm and dry.   Neurological:      General: No focal deficit present.      Mental Status: He is alert. Mental status is at baseline.      Comments: Right upper extremity: 5/5 strength with handgrip and flexion/extension of shoulders, elbows.   Light touch sensation intact and equal when compared to the left upper extremity.    Left upper extremity: 5/5 strength with handgrip and flexion/extension of shoulders, elbows.   Light touch sensation intact and equal when compared to the right upper extremity.    Right lower extremity: 5/5 strength with flexion/extension of hips, knees, and  dorsi/plantarflexion of ankles. Able to wiggle toes.   Light touch sensation intact and equal when compared to the left lower extremity.    Left lower extremity: 5/5 strength with flexion/extension of hips, knees, and dorsi/plantarflexion of ankles. Able to wiggle toes.   Light touch sensation intact and equal when compared to the right lower extremity.    Light sensation intact in bilateral face. CN 2-12 normal.    Psychiatric:         Behavior: Behavior normal.         Thought Content: Thought content normal.   Scalp atraumatic.   Forehead atraumatic, stable to palpation, nontender.   Midface stable, nontender, no injuries noted.   No intraoral injuries noted.   No otorrhea.   Trachea midline, breath sounds were noted bilaterally. PERRL.    No cervical spine ttp. No paraspinal cervical spine ttp bilaterally. Pt has full ROM with flexion, extension, lateral flexion, lateral rotation without pain.    Cervical spine: no midline tenderness to palpation. No paraspinal tenderness to palpation.  Thoracic spine: no midline tenderness to palpation. No paraspinal tenderness to palpation.  Lumbar spine: no midline tenderness to palpation. No paraspinal tenderness to palpation.  Spine stable with no palpable deformity or step-off    Clavicles stable and nontender to AP and lateral compression.  Chest stable and nontender to AP and lateral compression.  Pelvis stable and nontender to lateral compression.    Extremities are warm, well-perfused with capillary refill intact and no gross motor deficits.    Procedures           ED Course  ED Course as of 09/21/23 1342   Thu Sep 21, 2023   1159 EKG: sinus rhythm, no focal ischemic changes, Twi are old, branch block is old. [AS]   1241 -chest x-ray: no focal consolidations, no pulmonary edema, mediastinum not widened. [AS]   1336 CT imaging unremarkable; air-fluid in the maxillary sinus however he has no sinus pressure or drainage or sinusitis symptoms. [AS]   1336 -Laboratory  studies reviewed by me and are notable for no focal abnormality. [AS]      ED Course User Index  [AS] Dewayne Pandey MD                                           Medical Decision Making  Amount and/or Complexity of Data Reviewed  Labs: ordered.  Radiology: ordered.  ECG/medicine tests: ordered.    Risk  Prescription drug management.      Carlos A Boyer Jr. is a 63 y.o. male with PMH above who presents to the Emergency Department with syncope and anal pain.   Regarding the pain in his anus he has evidence of a fissure; discussed this diagnosis and expectant treatment.  \Regarding his syncope, he had no chest pain or trouble breathing or palpitations.  He has no peripheral symptoms of DVT.  Does take a blood thinner, PE is considered unlikely in this patient.  He had an echo previously that did not show a significant valvular abnormality and he has a chronic murmur.  He has a pretty clear provoking etiology which was hypoglycemia with a sugar of 50 in the context of exertion which likely provoked syncope.  History physical not consistent with seizure.  Does have a head injury.  Will obtain CT head and C-spine.     ED Course:   -See ED course above.  [place] CV risk were 0.  Plan is for lidocaine ointment for his fissure, constipation management, close outpatient follow-up regarding syncope.  Return precautions discussed.  Patient feels well on repeat assessment.  Discharged in stable condition without acute event.      Final diagnosis: Syncope    All questions answered. Patient/family was understanding and in agreement with today's assessment and plan. The patient was monitored during their stay in the ED and dispositioned without acute event.    Electronically signed by:  Dewayne Pandey MD 9/21/2023 12:15 CDT      Note: Dragon medical dictation software was used in the creation of this note.        Final diagnoses:   None       ED Disposition  ED Disposition       None            No follow-up  provider specified.       Medication List      No changes were made to your prescriptions during this visit.            Dewayne Pandey MD  09/21/23 [id number]

## 2023-09-22 LAB
QT INTERVAL: 398 MS
QTC INTERVAL: 450 MS

## 2023-09-25 ENCOUNTER — OFFICE VISIT (OUTPATIENT)
Dept: UROLOGY | Facility: CLINIC | Age: 63
End: 2023-09-25

## 2023-09-25 VITALS — TEMPERATURE: 97 F | WEIGHT: 241 LBS | HEIGHT: 72 IN | BODY MASS INDEX: 32.64 KG/M2

## 2023-09-25 DIAGNOSIS — N13.8 BPH WITH OBSTRUCTION/LOWER URINARY TRACT SYMPTOMS: Primary | ICD-10-CM

## 2023-09-25 DIAGNOSIS — N40.1 BPH WITH OBSTRUCTION/LOWER URINARY TRACT SYMPTOMS: Primary | ICD-10-CM

## 2023-09-25 PROCEDURE — 99214 OFFICE O/P EST MOD 30 MIN: CPT | Performed by: PHYSICIAN ASSISTANT

## 2023-09-25 RX ORDER — TAMSULOSIN HYDROCHLORIDE 0.4 MG/1
1 CAPSULE ORAL NIGHTLY
Qty: 30 CAPSULE | Refills: 2 | Status: SHIPPED | OUTPATIENT
Start: 2023-09-25 | End: 2023-12-24

## 2023-12-01 NOTE — ANESTHESIA PREPROCEDURE EVALUATION
Anesthesia Evaluation     Patient summary reviewed and Nursing notes reviewed   history of anesthetic complications: PONV  NPO Solid Status: > 8 hours  NPO Liquid Status: > 8 hours     Airway   Mallampati: III  Neck ROM: full  possible difficult intubation  Dental    (+) poor dentition    Comment: Multiple decayed/caries of the molars, both left/right and upper and lower      Pulmonary     breath sounds clear to auscultation  (+) asthma (uses inhalers prn), sleep apnea,   (-) not a smoker  Cardiovascular   Exercise tolerance: poor (<4 METS)    ECG reviewed  Patient on routine beta blocker and Beta blocker given within 24 hours of surgery  Rhythm: regular  Rate: normal    (+) hypertension, valvular problems/murmurs (mild ai, mild mr on echo 10/2016) MR and AI, past MI , CAD, cardiac stents (5 stents- most recent Feb 2016) more than 12 months ago angina (rare, no recent increase in frequency), PVD (pt reports 65% stenosed on both sides),     ROS comment: Stress test 2/2017 reviewed: very small area ischemia lateral wall, thought to be low risk test. Reviewed Dr. Ramey's office note- low risk stress test and known small vessel CAD. Plan for continued medical management and cath if worsening chest pain with exertion.     Neuro/Psych  (+) seizures (last seizure 4 months ago), CVA (May 2012- right sided weakness, has improved over time but still has some residual),    GI/Hepatic/Renal/Endo    (+) obesity,  GERD, diabetes mellitus type 2 using insulin,     Musculoskeletal     Abdominal    Substance History      OB/GYN          Other   (+) arthritis                                   Anesthesia Plan    ASA 3     general     intravenous induction   Anesthetic plan and risks discussed with patient.       No

## 2023-12-08 ENCOUNTER — TELEPHONE (OUTPATIENT)
Dept: CARDIOLOGY | Facility: CLINIC | Age: 63
End: 2023-12-08
Payer: OTHER GOVERNMENT

## 2023-12-08 NOTE — TELEPHONE ENCOUNTER
PATIENT IS HAVING BILATERAL CARPAL TUNNEL RELEASES.    PROCEDURE WILL BE UNDER MAC/NELSON BLOCK ANESTHESIA.    THEY WOULD LIKE HIM TO HOLD ASA AND ELIQUIS PRIOR TO SURGERY,     PLEASE ADVISE.

## 2023-12-18 NOTE — PROGRESS NOTES
Subjective    Mr. Boyre is 63 y.o. male    Chief Complaint: BPH     History of Present Illness  Patient is a 63-year-old gentleman who has had previous UroLift and also underwent TURP on 01/23/2023.  He had excellent response he was developing weaker stream and hesitancy I started him on tamsulosin his main complaint now is nocturia and frequency he denies any issues with his flow or stream his bladder scan showed 71 mL.  His urine is not infected looking.     The following portions of the patient's history were reviewed and updated as appropriate: allergies, current medications, past family history, past medical history, past social history, past surgical history and problem list.    Review of Systems   Genitourinary:  Positive for frequency and urgency.         Current Outpatient Medications:     acetaminophen (TYLENOL) 325 MG tablet, Take 1 tablet by mouth Every 6 (Six) Hours As Needed for Mild Pain., Disp: , Rfl:     albuterol (PROVENTIL HFA;VENTOLIN HFA) 108 (90 BASE) MCG/ACT inhaler, Inhale 2 puffs Every 6 (Six) Hours As Needed for Wheezing., Disp: , Rfl:     apixaban (ELIQUIS) 5 MG tablet tablet, Take 1 tablet by mouth 2 (Two) Times a Day., Disp: , Rfl:     Aspirin 81 MG capsule, Take 81 mg by mouth Daily. Dr. Ramey or Dr. Tang, Disp: , Rfl:     calcium polycarbophil (FIBERCON) 625 MG tablet, Take 1 tablet by mouth Daily As Needed., Disp: , Rfl:     carboxymethylcellulose (REFRESH PLUS) 0.5 % solution, Administer 1 drop to both eyes 4 (Four) Times a Day As Needed for Dry Eyes., Disp: , Rfl:     empagliflozin (JARDIANCE) 10 MG tablet tablet, Take 1 tablet by mouth Daily., Disp: , Rfl:     ezetimibe (ZETIA) 10 MG tablet, Take 1 tablet by mouth Daily., Disp: , Rfl:     fluticasone (FLONASE) 50 MCG/ACT nasal spray, 2 sprays into the nostril(s) as directed by provider Daily As Needed for Rhinitis or Allergies., Disp: , Rfl:     furosemide (Lasix) 40 MG tablet, Take 1 tablet by mouth Daily As Needed (take as  needed for weight gain more than 2 pounds in a day or more than than 3 pounds in 2 days.)., Disp: 90 tablet, Rfl: 1    gabapentin (NEURONTIN) 300 MG capsule, Take 1 capsule by mouth Every Night., Disp: , Rfl:     guaiFENesin (MUCINEX) 600 MG 12 hr tablet, Take 2 tablets by mouth Daily As Needed for Congestion., Disp: , Rfl:     insulin aspart (novoLOG FLEXPEN) 100 UNIT/ML solution pen-injector sc pen, Inject 40 Units under the skin into the appropriate area as directed 3 (Three) Times a Day With Meals. Can use 5 to 6 more units if needed in early evening., Disp: , Rfl:     Insulin Detemir (LEVEMIR FLEXPEN SC), Inject  under the skin into the appropriate area as directed. 100 at night 110 in the morning, Disp: , Rfl:     isosorbide mononitrate (IMDUR) 120 MG 24 hr tablet, Take 1 tablet by mouth Daily., Disp: 90 tablet, Rfl: 3    lamoTRIgine (LaMICtal) 200 MG tablet, Take 1 tablet by mouth 2 (Two) Times a Day., Disp: 180 tablet, Rfl: 1    levETIRAcetam (KEPPRA) 500 MG tablet, Take 3 tablets by mouth Every Morning., Disp: 90 tablet, Rfl: 5    levETIRAcetam (KEPPRA) 500 MG tablet, Take 4 tablets by mouth Every Night., Disp: 120 tablet, Rfl: 5    metFORMIN (GLUCOPHAGE) 1000 MG tablet, Take 1 tablet by mouth 2 (Two) Times a Day With Meals., Disp: , Rfl:     metoprolol tartrate (LOPRESSOR) 100 MG tablet, Take 1 tablet by mouth 2 (Two) Times a Day. Told to take the DOS-2/27/23, w/ sip of water., Disp: , Rfl:     Multiple Vitamin (MULTI VITAMIN PO), Take 1 tablet by mouth Daily., Disp: , Rfl:     nitroglycerin (NITROSTAT) 0.4 MG SL tablet, Place 1 tablet under the tongue Every 5 (Five) Minutes As Needed for Chest Pain. Take no more than 3 doses in 15 minutes., Disp: , Rfl:     pantoprazole (PROTONIX) 40 MG EC tablet, Take 1 tablet by mouth 2 (Two) Times a Day., Disp: , Rfl:     polyethylene glycol (MIRALAX) 17 GM/SCOOP powder, Take two scoops twice a day until bowel movements normalize, Disp: 578 g, Rfl: 0    psyllium  (METAMUCIL) 58.6 % packet, Take 1 packet by mouth Daily As Needed (constipation)., Disp: , Rfl:     Rimegepant Sulfate (Nurtec) 75 MG tablet dispersible tablet, Take 1 tablet by mouth As Needed (Migraine)., Disp: 8 tablet, Rfl: 5    rosuvastatin (CRESTOR) 20 MG tablet, Take 1 tablet by mouth Every Night., Disp: , Rfl:     sacubitril-valsartan (Entresto)  MG tablet, Take 1 tablet by mouth 2 (Two) Times a Day., Disp: , Rfl:     Semaglutide,0.25 or 0.5MG/DOS, (Ozempic, 0.25 or 0.5 MG/DOSE,) 2 MG/1.5ML solution pen-injector, Inject  under the skin into the appropriate area as directed 1 (One) Time Per Week., Disp: , Rfl:     spironolactone (ALDACTONE) 50 MG tablet, Take 1 tablet by mouth Daily., Disp: , Rfl:     tamsulosin (FLOMAX) 0.4 MG capsule 24 hr capsule, Take 1 capsule by mouth Daily., Disp: , Rfl:     Vibegron (Gemtesa) 75 MG tablet, Take 1 tablet by mouth Daily for 42 days., Disp: 42 tablet, Rfl: 0    Past Medical History:   Diagnosis Date    Arthritis     Asthma     Cancer     skin    Carotid disease, bilateral     Cellulitis     right foot - on antibiotics 6-    Chest pain     Chronic diastolic congestive heart failure 09/07/2019    Chronic sinusitis     Maribell bullosa     Coronary artery disease involving native coronary artery of native heart with unstable angina pectoris 01/17/2017    Deviated septum     Diabetes mellitus     Difficulty urinating     Diverticulitis     Enlarged prostate     Fatty liver     GERD (gastroesophageal reflux disease)     [place] (hard of hearing)     Does have hearing aids    Hyperlipidemia LDL goal <70 02/02/2017    Hypertrophy of nasal turbinates     Keratoderma     Kidney stone     Lung nodules     lower right lung    Migraine     Murmur, heart     Myocardial infarction     Obesity     Paroxysmal atrial fibrillation 07/11/2019    Personal history of COVID-19 07/2021    PONV (postoperative nausea and vomiting)     Primary hypertension 10/16/2016    Psoriasis      Seizures     Sinus congestion     Skin cancer     Sleep apnea     not using cpap    SOB (shortness of breath)     Stroke     mild weakness on right side    UTI (urinary tract infection)        Past Surgical History:   Procedure Laterality Date    CARDIAC CATHETERIZATION  01/2016    Dr. Broadbent; widely patent previously placed stents in the left anterior descending and obstructive disease involving the diagonal branch which was treated medically    CARDIAC CATHETERIZATION N/A 07/14/2017    Procedure: Left Heart Cath;  Surgeon: Wade Ramey MD;  Location:  [facility];  Service:     CARDIAC CATHETERIZATION Left 10/15/2018    Procedure: Cardiac Catheterization/Vascular Study;  Surgeon: Wade Ramey MD;  Location:  [facility];  Service: Cardiology    CARDIAC CATHETERIZATION  10/15/2018    Procedure: Functional Flow [place];  Surgeon: Wade Ramey MD;  Location:  [facility];  Service: Cardiology    CARDIAC CATHETERIZATION N/A 10/15/2018    Procedure: Left ventriculography;  Surgeon: Wade Ramey MD;  Location:  [facility];  Service: Cardiology    CARDIAC CATHETERIZATION Left 06/26/2019    Procedure: Cardiac Catheterization/Vascular Study VEL OK  HE WILL WAIT 1 YEAR FOR SHOULDER SURGERY ;  Surgeon: Wade Ramey MD;  Location:  [facility];  Service: Cardiology    CARDIAC CATHETERIZATION Left 04/30/2021    Procedure: Coronary angiography;  Surgeon: Sahil Llamas MD;  Location:  [facility];  Service: Cardiology;  Laterality: Left;    CARDIAC CATHETERIZATION N/A 04/30/2021    Procedure: Percutaneous Coronary Intervention;  Surgeon: Sahil Llamas MD;  Location:  [facility];  Service: Cardiology;  Laterality: N/A;    CARDIAC CATHETERIZATION N/A 11/09/2022    Procedure: Left Heart Cath with SVGs;  Surgeon: Wade Ramey MD;  Location:  [facility];  Service: Cardiology;   Laterality: N/A;    CHOLECYSTECTOMY      CHOLECYSTECTOMY WITH INTRAOPERATIVE CHOLANGIOGRAM N/A 08/01/2018    Procedure: CHOLECYSTECTOMY LAPAROSCOPIC INTRAOPERATIVE CHOLANGIOGRAM;  Surgeon: Shane Ann MD;  Location: [facility] OR;  Service: General    COLONOSCOPY N/A 07/14/2020    Procedure: COLONOSCOPY WITH ANESTHESIA;  Surgeon: Anupam Morales DO;  Location: [facility] ENDOSCOPY;  Service: Gastroenterology;  Laterality: N/A;  pre: abdominal pain  post: diverticulosis  Vinayak Correia PA    CORONARY ANGIOPLASTY      CORONARY ARTERY BYPASS GRAFT N/A 07/06/2019    Procedure: CABG X2 WITH LIMA, LEFT LEG OVH, AND PLACEMENT OF LEFT FEMORAL ARTERIAL LINE;  Surgeon: Steven Tang MD;  Location: [facility] OR;  Service: Cardiothoracic    CORONARY STENT PLACEMENT      x 6    CYSTOSCOPY TRANSURETHRAL RESECTION OF PROSTATE N/A 01/23/2023    Procedure: CYSTOSCOPY TRANSURETHRAL RESECTION OF PROSTATE;  Surgeon: Latrell Pope MD;  Location: [facility] OR;  Service: Urology;  Laterality: N/A;    ENDOSCOPIC FUNCTIONAL SINUS SURGERY (FESS) Bilateral 12/13/2017    Procedure: PROCEDURE PERFORMED:  Bilateral functional endoscopic anterior ethmoidectomy with bilateral middle meatal antrostomy Septoplasty Right kathia bullosa resection Bilateral inferior turbinate reduction via Coblation;  Surgeon: Mayank Ibarra MD;  Location: [facility] OR;  Service:     ENDOSCOPY N/A 07/30/2018    Procedure: ESOPHAGOGASTRODUODENOSCOPY WITH ANESTHESIA;  Surgeon: Benitez Mas MD;  Location: [facility] ENDOSCOPY;  Service: Gastroenterology    ENDOSCOPY N/A 07/14/2020    Procedure: ESOPHAGOGASTRODUODENOSCOPY WITH ANESTHESIA;  Surgeon: Anupam Morales DO;  Location: [facility] ENDOSCOPY;  Service: Gastroenterology;  Laterality: N/A;  pre: abdominal pain  post: esophagitis  Vinayak Correia PA    HERNIA REPAIR      x2 inguinal area    KIDNEY STONE SURGERY      KNEE ARTHROSCOPY Right 03/01/2022    Procedure: RIGHT KNEE PARTIAL LATERAL  "MENISCECTOMY;  Surgeon: Pedro Pablo Song MD;  Location:  [facility] OR;  Service: Orthopedics;  Laterality: Right;    KNEE SURGERY Right     OTHER SURGICAL HISTORY      urolift    PROSTATE SURGERY      Dr. Badillo -     PULMONARY ARTERY PRESSURE SENSOR IMPLANT N/A 2023    Procedure: PA Pressure Sensor Implant;  Surgeon: Wade Ramey MD;  Location:  [facility];  Service: Cardiology;  Laterality: N/A;    ROTATOR CUFF REPAIR Right     SLEEP ENDOSCOPY N/A 2023    Procedure: Videosleep endoscopy;  Surgeon: Mayank Ibarra MD;  Location:  [facility] OR;  Service: ENT;  Laterality: N/A;    THUMB AMPUTATION Left     partial    TOE SURGERY      great toe       Social History     Socioeconomic History    Marital status:    Tobacco Use    Smoking status: Former     Packs/day: 1.50     Years: 18.00     Additional pack years: 0.00     Total pack years: 27.00     Types: Cigarettes, Cigars     Start date:      Quit date:      Years since quittin.0     Passive exposure: Past    Smokeless tobacco: Former     Types: Chew     Quit date:    Vaping Use    Vaping Use: Never used   Substance and Sexual Activity    Alcohol use: No     Comment: 0    Drug use: No    Sexual activity: Defer       Family History   Problem Relation Age of Onset    Heart disease Father     COPD Mother     Hypertension Mother     Asthma Mother     No Known Problems Sister     Colon cancer Paternal Uncle     Prostate cancer Maternal Grandfather     No Known Problems Sister     Colon cancer Maternal Grandmother     Colon polyps Maternal Grandmother        Objective    Temp 98.4 °F (36.9 °C)   Ht 182.9 cm (72\")   Wt 116 kg (254 lb 12.8 oz)   BMI 34.56 kg/m²     Physical Exam  Vitals reviewed.   Constitutional:       Appearance: Normal appearance.   Pulmonary:      Effort: Pulmonary effort is normal.   Skin:     Coloration: Skin is not pale.   Neurological:      Mental Status: He is alert.   Psychiatric:    "      Mood and Affect: Mood normal.         Behavior: Behavior normal.             Results for orders placed or performed in visit on 01/02/24   POC Urinalysis Dipstick, Multipro    Specimen: Urine   Result Value Ref Range    Color Yellow Yellow, Straw, Dark Yellow, Rosalia    Clarity, UA Clear Clear    Glucose, UA >=1000 mg/dL (3+) (A) Negative mg/dL    Bilirubin Negative Negative    Ketones, UA Negative Negative    Specific Gravity  1.015 1.005 - 1.030    Blood, UA Negative Negative    pH, Urine 6.0 5.0 - 8.0    Protein, POC Negative Negative mg/dL    Urobilinogen, UA 1 E.U./dL (A) Normal, 0.2 E.U./dL    Nitrite, UA Negative Negative    Leukocytes Negative Negative       Bladder Scan interpretation  Estimation of residual urine via abdominal ultrasound  Residual Urine: 71 ml  Indication: BPH  Position: Supine  Examination: Incremental scanning of the suprapubic area using 3 MHz transducer using copious amounts of acoustic gel.   Findings: An anechoic area was demonstrated which represented the bladder, with measurement of residual urine as noted. I inspected this myself. In that the residual urine was stable or insignificant, no treatment will be necessary at this time.     Assessment and Plan    Diagnoses and all orders for this visit:    1. BPH with obstruction/lower urinary tract symptoms (Primary)  -     POC Urinalysis Dipstick, Multipro    2. Nocturia  -     Vibegron (Gemtesa) 75 MG tablet; Take 1 tablet by mouth Daily for 42 days.  Dispense: 42 tablet; Refill: 0  I recommend he continue the tamsulosin however due to his nocturia frequency and urgency I gave him samples of Gemtesa 75 mg.  He has had a previous TURP and UroLift still has lower urinary tract symptoms.  Return in approximately 3 months he will contact his let us know if he has had improvement in his symptoms on the Gemtesa.

## 2023-12-30 ENCOUNTER — HOSPITAL ENCOUNTER (EMERGENCY)
Facility: HOSPITAL | Age: 63
Discharge: HOME | End: 2023-12-30
Payer: OTHER GOVERNMENT

## 2023-12-30 ENCOUNTER — APPOINTMENT (OUTPATIENT)
Dept: ULTRASOUND IMAGING | Facility: HOSPITAL | Age: 63
End: 2023-12-30
Payer: OTHER GOVERNMENT

## 2023-12-30 ENCOUNTER — APPOINTMENT (OUTPATIENT)
Dept: GENERAL RADIOLOGY | Facility: HOSPITAL | Age: 63
End: 2023-12-30
Payer: OTHER GOVERNMENT

## 2023-12-30 VITALS
BODY MASS INDEX: 34.81 KG/M2 | RESPIRATION RATE: 18 BRPM | DIASTOLIC BLOOD PRESSURE: 70 MMHG | SYSTOLIC BLOOD PRESSURE: 125 MMHG | HEIGHT: 72 IN | HEART RATE: 65 BPM | WEIGHT: 257 LBS | OXYGEN SATURATION: 97 % | TEMPERATURE: 97.7 F

## 2023-12-30 DIAGNOSIS — M79.601 RIGHT ARM PAIN: Primary | ICD-10-CM

## 2023-12-30 PROCEDURE — 73090 X-RAY EXAM OF FOREARM: CPT

## 2023-12-30 PROCEDURE — 99284 EMERGENCY DEPT VISIT MOD MDM: CPT

## 2023-12-30 PROCEDURE — 93971 EXTREMITY STUDY: CPT

## 2023-12-30 NOTE — DISCHARGE INSTRUCTIONS
Today you have no evidence of blood clots in your arms and no bony abnormalities.  Please rest, ice, and elevate the area is much as possible.  Please also alternate with heat followed by gentle range of motion stretching.  Please make your orthopedic surgeon aware of your swelling prior to your surgery this week.  You will need to follow-up with your primary care provider within the next 48 hours for close reevaluation however should you develop any new or worsening symptoms please return to the ER for further evaluation.

## 2023-12-30 NOTE — ED PROVIDER NOTES
Subjective   History of Present Illness    Patient is a 63-year-old male presenting to ED with right arm swelling and pain.  PMH significant for right hand dominance, carpal tunnel, arthritis, insulin-dependent diabetes, long-term use Eliquis, status post previous stroke, history of blood clot.  Patient states he woke up this morning and was feeling normal at his baseline and was sitting watching the news when he developed sudden pain and swelling on the radial aspect of his proximal forearm where there is a firm tender lump.  Patient reports he became concerned as he has a history of a previous blood clot to an IV site as well as a stroke in 2010.  Patient notes that in 5 days he is scheduled to have a right-sided carpal tunnel surgery with the left side performed 3 weeks later.  Patient states that he has done no new repetitive movements and denies any injuries or trauma to the upper extremity this morning.  Patient states over the past few days has been feeling well with no soreness or abnormalities to the arm.  Patient denies any skin wounds.  Patient denies fevers, numbness or weakness of the arm, any history of similar sudden symptoms and presents at this time for further evaluation.  Patient did state that on Tuesday he is supposed to stop taking his Eliquis prior to the surgery however he has been compliant with it since.    Records reviewed show patient was last seen in the ED on 9/21/2023 for syncope.    Patient was most recently seen outpatient at the urology office on 9/25/2023 for BPH.    Review of Systems   Constitutional: Negative.  Negative for chills, diaphoresis and fever.   HENT: Negative.     Eyes: Negative.    Respiratory: Negative.  Negative for shortness of breath.    Cardiovascular:  Negative for chest pain and palpitations.   Gastrointestinal: Negative.    Genitourinary: Negative.    Musculoskeletal:  Positive for myalgias (Right forearm). Negative for arthralgias.   Skin: Negative.   Negative for color change, rash and wound.   Neurological: Negative.  Negative for weakness and numbness.   Psychiatric/Behavioral: Negative.     All other systems reviewed and are negative.      Past Medical History:   Diagnosis Date    Arthritis     Asthma     Cancer     skin    Carotid disease, bilateral     Cellulitis     right foot - on antibiotics 6-    Chest pain     Chronic diastolic congestive heart failure 09/07/2019    Chronic sinusitis     Maribell bullosa     Coronary artery disease involving native coronary artery of native heart with unstable angina pectoris 01/17/2017    Deviated septum     Diabetes mellitus     Difficulty urinating     Diverticulitis     Enlarged prostate     Fatty liver     GERD (gastroesophageal reflux disease)     [place] (hard of hearing)     Does have hearing aids    Hyperlipidemia LDL goal <70 02/02/2017    Hypertrophy of nasal turbinates     Keratoderma     Kidney stone     Lung nodules     lower right lung    Migraine     Murmur, heart     Myocardial infarction     Obesity     Paroxysmal atrial fibrillation 07/11/2019    Personal history of COVID-19 07/2021    PONV (postoperative nausea and vomiting)     Primary hypertension 10/16/2016    Psoriasis     Seizures     Sinus congestion     Skin cancer     Sleep apnea     not using cpap    SOB (shortness of breath)     Stroke     mild weakness on right side    UTI (urinary tract infection)        Allergies   Allergen Reactions    Flagyl [Metronidazole] Hives    Atorvastatin Other (See Comments) and Myalgia      - LIPITOR -   Muscle cramps    Ciprofloxacin Hives      - CIPRO -        Past Surgical History:   Procedure Laterality Date    CARDIAC CATHETERIZATION  01/2016    Dr. Broadbent; widely patent previously placed stents in the left anterior descending and obstructive disease involving the diagonal branch which was treated medically    CARDIAC CATHETERIZATION N/A 07/14/2017    Procedure: Left Heart Cath;  Surgeon: Wade  MD Karoline;  Location:  [facility] CATH [facility]ASIVE [facility];  Service:     CARDIAC CATHETERIZATION Left 10/15/2018    Procedure: Cardiac Catheterization/Vascular Study;  Surgeon: Wade Ramey MD;  Location:  [facility];  Service: Cardiology    CARDIAC CATHETERIZATION  10/15/2018    Procedure: Functional Flow [place];  Surgeon: Wade Ramey MD;  Location:  [facility];  Service: Cardiology    CARDIAC CATHETERIZATION N/A 10/15/2018    Procedure: Left ventriculography;  Surgeon: Wade Ramey MD;  Location:  [facility];  Service: Cardiology    CARDIAC CATHETERIZATION Left 06/26/2019    Procedure: Cardiac Catheterization/Vascular Study VEL OK  HE WILL WAIT 1 YEAR FOR SHOULDER SURGERY ;  Surgeon: Wade Ramey MD;  Location: [facility] CATH INVASIVE LOCATION;  Service: Cardiology    CARDIAC CATHETERIZATION Left 04/30/2021    Procedure: Coronary angiography;  Surgeon: Sahil Llamas MD;  Location: [facility] CATH INVASIVE LOCATION;  Service: Cardiology;  Laterality: Left;    CARDIAC CATHETERIZATION N/A 04/30/2021    Procedure: Percutaneous Coronary Intervention;  Surgeon: Sahil Llamas MD;  Location:  [facility];  Service: Cardiology;  Laterality: N/A;    CARDIAC CATHETERIZATION N/A 11/09/2022    Procedure: Left Heart Cath with SVGs;  Surgeon: Wade Ramey MD;  Location: [facility] CATH INVASIVE LOCATION;  Service: Cardiology;  Laterality: N/A;    CHOLECYSTECTOMY      CHOLECYSTECTOMY WITH INTRAOPERATIVE CHOLANGIOGRAM N/A 08/01/2018    Procedure: CHOLECYSTECTOMY LAPAROSCOPIC INTRAOPERATIVE CHOLANGIOGRAM;  Surgeon: Shane Ann MD;  Location: [facility] OR;  Service: General    COLONOSCOPY N/A 07/14/2020    Procedure: COLONOSCOPY WITH ANESTHESIA;  Surgeon: Anupam Morales DO;  Location: [facility] ENDOSCOPY;  Service: Gastroenterology;  Laterality: N/A;  pre: abdominal pain  post: diverticulosis  Vinayak Correia PA    CORONARY ANGIOPLASTY      CORONARY ARTERY  BYPASS GRAFT N/A 07/06/2019    Procedure: CABG X2 WITH LIMA, LEFT LEG OVH, AND PLACEMENT OF LEFT FEMORAL ARTERIAL LINE;  Surgeon: Steven Tang MD;  Location:  [facility] OR;  Service: Cardiothoracic    CORONARY STENT PLACEMENT      x 6    CYSTOSCOPY TRANSURETHRAL RESECTION OF PROSTATE N/A 01/23/2023    Procedure: CYSTOSCOPY TRANSURETHRAL RESECTION OF PROSTATE;  Surgeon: Latrell Pope MD;  Location:  [facility] OR;  Service: Urology;  Laterality: N/A;    ENDOSCOPIC FUNCTIONAL SINUS SURGERY (FESS) Bilateral 12/13/2017    Procedure: PROCEDURE PERFORMED:  Bilateral functional endoscopic anterior ethmoidectomy with bilateral middle meatal antrostomy Septoplasty Right kathia bullosa resection Bilateral inferior turbinate reduction via Coblation;  Surgeon: Mayank Ibarra MD;  Location:  [facility] OR;  Service:     ENDOSCOPY N/A 07/30/2018    Procedure: ESOPHAGOGASTRODUODENOSCOPY WITH ANESTHESIA;  Surgeon: Benitez Mas MD;  Location:  [facility] ENDOSCOPY;  Service: Gastroenterology    ENDOSCOPY N/A 07/14/2020    Procedure: ESOPHAGOGASTRODUODENOSCOPY WITH ANESTHESIA;  Surgeon: Anupam Morales DO;  Location:  [facility] ENDOS[facility];  Service: Gastroenterology;  Laterality: N/A;  pre: abdominal pain  post: esophagitis  Vinayak Correia, PA    HERNIA REPAIR      x2 inguinal area    KIDNEY STONE SURGERY      KNEE ARTHROSCOPY Right 03/01/2022    Procedure: RIGHT KNEE PARTIAL LATERAL MENISCECTOMY;  Surgeon: Pedro Pablo Song MD;  Location:  [facility] OR;  Service: Orthopedics;  Laterality: Right;    KNEE SURGERY Right     OTHER SURGICAL HISTORY      urolift    PROSTATE SURGERY      Dr. Badillo - 2017    PULMONARY ARTERY PRESSURE SENSOR IMPLANT N/A 05/08/2023    Procedure: PA Pressure Sensor Implant;  Surgeon: Wade Ramey MD;  Location:  [facility] CATH INV[facility]IVE [facility];  Service: Cardiology;  Laterality: N/A;    ROTATOR CUFF REPAIR Right     SLEEP ENDOSCOPY N/A 02/27/2023    Procedure: Videosleep endoscopy;  Surgeon: Mayank Ibarra  MD Richard;  Location:  [facility] OR;  Service: ENT;  Laterality: N/A;    THUMB AMPUTATION Left     partial    TOE SURGERY      great toe       Family History   Problem Relation Age of Onset    Heart disease Father     COPD Mother     Hypertension Mother     Asthma Mother     No Known Problems Sister     Colon cancer Paternal Uncle     Prostate cancer Maternal Grandfather     No Known Problems Sister     Colon cancer Maternal Grandmother     Colon polyps Maternal Grandmother        Social History     Socioeconomic History    Marital status:    Tobacco Use    Smoking status: Former     Packs/day: 1.50     Years: 18.00     Additional pack years: 0.00     Total pack years: 27.00     Types: Cigarettes, Cigars     Start date:      Quit date:      Years since quittin.0     Passive exposure: Past    Smokeless tobacco: Former     Types: Chew     Quit date:    Vaping Use    Vaping Use: Never used   Substance and Sexual Activity    Alcohol use: No     Comment: 0    Drug use: No    Sexual activity: Defer           Objective   Physical Exam  Vitals and nursing note reviewed.   Constitutional:       General: He is not in acute distress.     Appearance: Normal appearance. He is obese. He is not ill-appearing, toxic-appearing or diaphoretic.   HENT:      Head: Normocephalic.      Mouth/Throat:      Mouth: Mucous membranes are moist.      Pharynx: Oropharynx is clear.   Eyes:      Conjunctiva/sclera: Conjunctivae normal.      Pupils: Pupils are equal, round, and reactive to light.   Cardiovascular:      Rate and Rhythm: Normal rate and regular rhythm.      Pulses: Normal pulses.           Radial pulses are 2+ on the right side and 2+ on the left side.   Pulmonary:      Effort: Pulmonary effort is normal.      Breath sounds: Normal breath sounds.   Abdominal:      Palpations: Abdomen is soft.   Musculoskeletal:         General: Swelling and tenderness present. No deformity or signs of injury. Normal range of  motion.      Cervical back: Normal range of motion and neck supple. No tenderness.      Right lower leg: No edema.      Left lower leg: No edema.      Comments: Area of tenderness over firm palpable region to the proximal radial aspect of the dorsal forearm.  No overlying skin changes including no erythema, wound defects, or rashes.  Full active range of motion of all joints of the right upper extremity with no limitations due to pain.  Distal to this area right upper extremities neurovascular intact with brisk cap refill.  No abnormalities to the ulnar aspect.  No involvement of the elbow joint.  Left upper extremity normal to inspection including no palpable masses, no bony tenderness, no muscular tenderness, full active range of motion of all joints.  Left upper extremity is as well neurovascular intact distally.  No cervical midline or paraspinal muscular abnormalities.   Skin:     General: Skin is warm and dry.      Findings: No erythema, signs of injury, rash or wound.   Neurological:      Mental Status: He is alert and oriented to person, place, and time.      Sensory: No sensory deficit.      Motor: No weakness (5/5 symmetric strength bilateral upper extremities).      Gait: Gait normal.   Psychiatric:         Mood and Affect: Mood normal.         Behavior: Behavior normal.         Procedures           ED Course                                             Medical Decision Making  Problems Addressed:  Right arm pain: complicated acute illness or injury    Amount and/or Complexity of Data Reviewed  Independent Historian: spouse     Details: Wife  External Data Reviewed: labs, radiology and notes.  Radiology: ordered. Decision-making details documented in ED Course.        Patient is a 63-year-old male presenting to ED with right arm swelling and pain.  PMH significant for right hand dominance, carpal tunnel, arthritis, insulin-dependent diabetes, long-term use Eliquis, status post previous stroke, history of  blood clot.  Upon initial evaluation patient resting comfortably in bed not ill and nontoxic appearing.  Low concern for cellulitic or infectious nature as patient has no febrile status, no tachycardia, no overlying erythema, and no wounds noted to the upper extremity.  Patient with no constitutional symptoms including no evidence of nausea or vomiting.  Initial vital signs are stable.  Right upper extremity neurovascularly intact distally with evidence of palpable firm tenderness to the proximal radial aspect of the dorsal right forearm.  No overlying evidence of cellulitis to include no wounds, no rashes, no erythema.  No evidence of injuries including abrasions, lacerations, or contusions.  X-ray imaging of the right forearm showed: No acute osseous findings.  Venous Doppler studies of the right upper extremity showed: No evidence of DVT.  Throughout evaluation patient declined any medications for pain or discomfort.  Bilateral upper extremities remain neurovascularly intact distally with no limitations on MSK examination.  Discussed with patient need for symptomatic treatment of likely muscular injury to include appropriate use of heat and ice, elevation, as well as use of over-the-counter anti-inflammatories.  Advised patient need to contact his surgeon to assure that he can still have his carpal tunnel surgery.  Discussed PCP follow-up within the next 48 hours for close reevaluation, strict return precautions, and need for immediate return to ED should he develop any new or worsening symptoms.  Patient continues to have stable vital signs, no focal neurological deficits.  Patient is appreciative with no further questions, concerns, or needs at this time and is stable for discharge.    Differential diagnosis: DVT, SVT, degenerative arthritis, cellulitis, dermatitis, muscular strain    Final diagnoses:   Right arm pain       ED Disposition  ED Disposition       ED Disposition   Discharge    Condition   Stable     Comment   --               Vinayak Correia PA  [address]  [facility] [address]  [phone]    Schedule an appointment as soon as possible for a visit in 2 days      [facility] EMERGENCY DEPARTMENT  [address] [place] [id number] [phone]    As needed         Medication List      No changes were made to your prescriptions during this visit.            Vinayak Arthur PA-C  12/30/23 [id number]

## 2024-01-02 ENCOUNTER — OFFICE VISIT (OUTPATIENT)
Dept: UROLOGY | Facility: CLINIC | Age: 64
End: 2024-01-02
Payer: OTHER GOVERNMENT

## 2024-01-02 VITALS — TEMPERATURE: 98.4 F | BODY MASS INDEX: 34.51 KG/M2 | WEIGHT: 254.8 LBS | HEIGHT: 72 IN

## 2024-01-02 DIAGNOSIS — N13.8 BPH WITH OBSTRUCTION/LOWER URINARY TRACT SYMPTOMS: Primary | ICD-10-CM

## 2024-01-02 DIAGNOSIS — N40.1 BPH WITH OBSTRUCTION/LOWER URINARY TRACT SYMPTOMS: Primary | ICD-10-CM

## 2024-01-02 DIAGNOSIS — R35.1 NOCTURIA: ICD-10-CM

## 2024-01-02 LAB
BILIRUB BLD-MCNC: NEGATIVE MG/DL
CLARITY, POC: CLEAR
COLOR UR: YELLOW
GLUCOSE UR STRIP-MCNC: ABNORMAL MG/DL
KETONES UR QL: NEGATIVE
LEUKOCYTE EST, POC: NEGATIVE
NITRITE UR-MCNC: NEGATIVE MG/ML
PH UR: 6 [PH] (ref 5–8)
PROT UR STRIP-MCNC: NEGATIVE MG/DL
RBC # UR STRIP: NEGATIVE /UL
SP GR UR: 1.01 (ref 1–1.03)
UROBILINOGEN UR QL: ABNORMAL

## 2024-01-02 PROCEDURE — 99214 OFFICE O/P EST MOD 30 MIN: CPT | Performed by: PHYSICIAN ASSISTANT

## 2024-01-02 PROCEDURE — 51798 US URINE CAPACITY MEASURE: CPT | Performed by: PHYSICIAN ASSISTANT

## 2024-01-02 PROCEDURE — 81001 URINALYSIS AUTO W/SCOPE: CPT | Performed by: PHYSICIAN ASSISTANT

## 2024-01-02 RX ORDER — TAMSULOSIN HYDROCHLORIDE 0.4 MG/1
1 CAPSULE ORAL DAILY
COMMUNITY

## 2024-01-02 RX ORDER — VIBEGRON 75 MG/1
1 TABLET, FILM COATED ORAL DAILY
Qty: 42 TABLET | Refills: 0 | COMMUNITY
Start: 2024-01-02 | End: 2024-02-13

## 2024-01-17 ENCOUNTER — TELEPHONE (OUTPATIENT)
Dept: CARDIAC SURGERY | Facility: CLINIC | Age: 64
End: 2024-01-17
Payer: MEDICARE

## 2024-01-17 NOTE — TELEPHONE ENCOUNTER
Pt has follow up with Skye DICKINSON on 2-14-24.  Pt has [facility] ins.  Can someone send in a request for auth for this visit?  Thanks!/[email or web address]

## 2024-01-22 ENCOUNTER — TELEPHONE (OUTPATIENT)
Dept: VASCULAR SURGERY | Facility: CLINIC | Age: 64
End: 2024-01-22
Payer: MEDICARE

## 2024-01-30 NOTE — TELEPHONE ENCOUNTER
Faxed multiple times since 01/18. Called and spoke with [facility] rep and this was emailed on 01/23. Still have not received authorization.

## 2024-02-14 ENCOUNTER — HOSPITAL ENCOUNTER (OUTPATIENT)
Dept: CT IMAGING | Facility: HOSPITAL | Age: 64
Discharge: HOME | End: 2024-02-14
Admitting: NURSE PRACTITIONER
Payer: OTHER GOVERNMENT

## 2024-02-14 ENCOUNTER — OFFICE VISIT (OUTPATIENT)
Dept: CARDIAC SURGERY | Facility: CLINIC | Age: 64
End: 2024-02-14
Payer: OTHER GOVERNMENT

## 2024-02-14 VITALS
WEIGHT: 253.8 LBS | BODY MASS INDEX: 34.38 KG/M2 | SYSTOLIC BLOOD PRESSURE: 128 MMHG | OXYGEN SATURATION: 99 % | HEART RATE: 64 BPM | HEIGHT: 72 IN | DIASTOLIC BLOOD PRESSURE: 80 MMHG

## 2024-02-14 DIAGNOSIS — E66.09 CLASS 1 OBESITY DUE TO EXCESS CALORIES WITH SERIOUS COMORBIDITY AND BODY MASS INDEX (BMI) OF 34.0 TO 34.9 IN ADULT: ICD-10-CM

## 2024-02-14 DIAGNOSIS — I71.21 ANEURYSM OF ASCENDING AORTA WITHOUT RUPTURE: ICD-10-CM

## 2024-02-14 DIAGNOSIS — Z95.1 S/P CABG X 2: Chronic | ICD-10-CM

## 2024-02-14 DIAGNOSIS — Z87.891 FORMER SMOKER: ICD-10-CM

## 2024-02-14 DIAGNOSIS — I25.10 CORONARY ARTERY DISEASE INVOLVING NATIVE CORONARY ARTERY OF NATIVE HEART WITHOUT ANGINA PECTORIS: ICD-10-CM

## 2024-02-14 DIAGNOSIS — I71.21 ANEURYSM OF ASCENDING AORTA WITHOUT RUPTURE: Primary | ICD-10-CM

## 2024-02-14 DIAGNOSIS — R91.8 LUNG NODULES: ICD-10-CM

## 2024-02-14 LAB — CREAT BLDA-MCNC: 1.2 MG/DL (ref 0.6–1.3)

## 2024-02-14 PROCEDURE — 71275 CT ANGIOGRAPHY CHEST: CPT

## 2024-02-14 PROCEDURE — 82565 ASSAY OF CREATININE: CPT

## 2024-02-14 PROCEDURE — 99214 OFFICE O/P EST MOD 30 MIN: CPT | Performed by: NURSE PRACTITIONER

## 2024-02-14 PROCEDURE — 25510000001 IOPAMIDOL PER 1 ML: Performed by: NURSE PRACTITIONER

## 2024-02-14 RX ADMIN — IOPAMIDOL 100 ML: 755 INJECTION, SOLUTION INTRAVENOUS at 08:52

## 2024-02-19 ENCOUNTER — TELEPHONE (OUTPATIENT)
Dept: NEUROLOGY | Facility: CLINIC | Age: 64
End: 2024-02-19
Payer: MEDICARE

## 2024-02-19 NOTE — TELEPHONE ENCOUNTER
I spoke with Mr. Boyer to explain that his VA auth  yesterday, 2024. We will have to obtain a new neurology consult from the [facility] & Lilibeth was able to put him on Flako's schedule for 24 @ 8:30. Mr. Boyer is agreeable to this. I have faxed in the RFS this afternoon; however, the [facility] is closed today due to the [facility].

## 2024-04-04 ENCOUNTER — OFFICE VISIT (OUTPATIENT)
Dept: CARDIOLOGY | Facility: CLINIC | Age: 64
End: 2024-04-04
Payer: OTHER GOVERNMENT

## 2024-04-04 VITALS
SYSTOLIC BLOOD PRESSURE: 103 MMHG | HEIGHT: 72 IN | OXYGEN SATURATION: 96 % | HEART RATE: 66 BPM | WEIGHT: 251 LBS | BODY MASS INDEX: 34 KG/M2 | DIASTOLIC BLOOD PRESSURE: 64 MMHG

## 2024-04-04 DIAGNOSIS — I50.32 CHRONIC DIASTOLIC CONGESTIVE HEART FAILURE: ICD-10-CM

## 2024-04-04 DIAGNOSIS — I10 PRIMARY HYPERTENSION: Chronic | ICD-10-CM

## 2024-04-04 DIAGNOSIS — Z79.4 TYPE 2 DIABETES MELLITUS WITH OTHER CIRCULATORY COMPLICATION, WITH LONG-TERM CURRENT USE OF INSULIN: Chronic | ICD-10-CM

## 2024-04-04 DIAGNOSIS — I65.23 BILATERAL CAROTID ARTERY STENOSIS: Chronic | ICD-10-CM

## 2024-04-04 DIAGNOSIS — I25.10 CORONARY ARTERY DISEASE INVOLVING NATIVE CORONARY ARTERY OF NATIVE HEART WITHOUT ANGINA PECTORIS: Primary | ICD-10-CM

## 2024-04-04 DIAGNOSIS — I48.0 PAROXYSMAL ATRIAL FIBRILLATION: Chronic | ICD-10-CM

## 2024-04-04 DIAGNOSIS — G47.33 OSA ON CPAP: Chronic | ICD-10-CM

## 2024-04-04 DIAGNOSIS — E78.5 HYPERLIPIDEMIA LDL GOAL <70: Chronic | ICD-10-CM

## 2024-04-04 DIAGNOSIS — I71.21 ANEURYSM OF ASCENDING AORTA WITHOUT RUPTURE: ICD-10-CM

## 2024-04-04 DIAGNOSIS — E11.59 TYPE 2 DIABETES MELLITUS WITH OTHER CIRCULATORY COMPLICATION, WITH LONG-TERM CURRENT USE OF INSULIN: Chronic | ICD-10-CM

## 2024-04-04 DIAGNOSIS — E66.09 CLASS 1 OBESITY DUE TO EXCESS CALORIES WITH SERIOUS COMORBIDITY AND BODY MASS INDEX (BMI) OF 34.0 TO 34.9 IN ADULT: ICD-10-CM

## 2024-04-04 DIAGNOSIS — Z79.01 CHRONIC ANTICOAGULATION: ICD-10-CM

## 2024-04-04 RX ORDER — NITROGLYCERIN 0.4 MG/1
0.4 TABLET SUBLINGUAL
Qty: 25 TABLET | Refills: 2 | Status: SHIPPED | OUTPATIENT
Start: 2024-04-04

## 2024-04-04 NOTE — PROGRESS NOTES
Subjective:     Encounter Date: 04/04/2024      Patient ID: Carlos A Boyer Jr. is a 64 y.o. male with chronic diastolic congestive heart failure s/p CardioMEMs, coronary artery disease status post coronary artery stenting and CABG x2 (LIMA to LAD and SVG to OM) 7/6/2019, paroxysmal atrial fibrillation status post cardioversion on chronic anticoagulation, hypertension, hyperlipidemia, type 2 diabetes mellitus, peripheral vascular disease, cerebrovascular accident, obstructive sleep apnea and obesity    Chief Complaint: routine follow up  History of Present Illness  Patient presents today for management of congestive heart failure and coronary artery disease. Today he reports that he has been feeling about the same as his last visit. He reports that he has some sinus issues this time of year. He has some occasional shortness of breath with weather changes. He reports that he has some rare chest pain that is relieved with his NTG. He denies any heart racing or palpitations. He reports some dizziness. He was admitted to [facility] [place] recently. He reports that he had an infection in his left foot. He reports that he finished his antibiotics earlier this week. He reports that his BP has remained well controlled. He denies any leg swelling, orthopnea or PND. He has lasix to take as needed and reports seldomly using it. He takes eliquis and denies any bleeding issues. Patient follows with Vinayak Correia as PCP.     The following portions of the patient's history were reviewed and updated as appropriate: allergies, current medications, past family history, past medical history, past social history, past surgical history and problem list.    Allergies   Allergen Reactions    Flagyl [Metronidazole] Hives    Atorvastatin Other (See Comments) and Myalgia      - LIPITOR -   Muscle cramps    Ciprofloxacin Hives      - CIPRO -        Current Outpatient Medications:     acetaminophen (TYLENOL) 325 MG tablet, Take 1 tablet by  mouth Every 6 (Six) Hours As Needed for Mild Pain., Disp: , Rfl:     albuterol (PROVENTIL HFA;VENTOLIN HFA) 108 (90 BASE) MCG/ACT inhaler, Inhale 2 puffs Every 6 (Six) Hours As Needed for Wheezing., Disp: , Rfl:     apixaban (ELIQUIS) 5 MG tablet tablet, Take 1 tablet by mouth 2 (Two) Times a Day., Disp: , Rfl:     Aspirin 81 MG capsule, Take 81 mg by mouth Daily. Dr. Ramey or Dr. Tang, Disp: , Rfl:     calcium polycarbophil (FIBERCON) 625 MG tablet, Take 1 tablet by mouth Daily As Needed., Disp: , Rfl:     carboxymethylcellulose (REFRESH PLUS) 0.5 % solution, Administer 1 drop to both eyes 4 (Four) Times a Day As Needed for Dry Eyes., Disp: , Rfl:     empagliflozin (JARDIANCE) 10 MG tablet tablet, Take 1 tablet by mouth Daily., Disp: , Rfl:     ezetimibe (ZETIA) 10 MG tablet, Take 1 tablet by mouth Daily., Disp: , Rfl:     fluticasone (FLONASE) 50 MCG/ACT nasal spray, 2 sprays into the nostril(s) as directed by provider Daily As Needed for Rhinitis or Allergies., Disp: , Rfl:     furosemide (Lasix) 40 MG tablet, Take 1 tablet by mouth Daily As Needed (take as needed for weight gain more than 2 pounds in a day or more than than 3 pounds in 2 days.)., Disp: 90 tablet, Rfl: 1    gabapentin (NEURONTIN) 300 MG capsule, Take 1 capsule by mouth Every Night., Disp: , Rfl:     guaiFENesin (MUCINEX) 600 MG 12 hr tablet, Take 2 tablets by mouth Daily As Needed for Congestion., Disp: , Rfl:     insulin aspart (novoLOG FLEXPEN) 100 UNIT/ML solution pen-injector sc pen, Inject 40 Units under the skin into the appropriate area as directed 3 (Three) Times a Day With Meals. Can use 5 to 6 more units if needed in early evening., Disp: , Rfl:     Insulin Detemir (LEVEMIR FLEXPEN SC), Inject  under the skin into the appropriate area as directed. 100 at night 110 in the morning, Disp: , Rfl:     isosorbide mononitrate (IMDUR) 120 MG 24 hr tablet, Take 1 tablet by mouth Daily., Disp: 90 tablet, Rfl: 3    lamoTRIgine (LaMICtal) 200 MG  tablet, Take 1 tablet by mouth 2 (Two) Times a Day., Disp: 180 tablet, Rfl: 1    levETIRAcetam (KEPPRA) 500 MG tablet, Take 3 tablets by mouth Every Morning., Disp: 90 tablet, Rfl: 5    levETIRAcetam (KEPPRA) 500 MG tablet, Take 4 tablets by mouth Every Night., Disp: 120 tablet, Rfl: 5    metFORMIN (GLUCOPHAGE) 1000 MG tablet, Take 1 tablet by mouth 2 (Two) Times a Day With Meals., Disp: , Rfl:     metoprolol tartrate (LOPRESSOR) 100 MG tablet, Take 1 tablet by mouth 2 (Two) Times a Day. Told to take the DOS-2/27/23, w/ sip of water., Disp: , Rfl:     Multiple Vitamin (MULTI VITAMIN PO), Take 1 tablet by mouth Daily., Disp: , Rfl:     nitroglycerin (NITROSTAT) 0.4 MG SL tablet, Place 1 tablet under the tongue Every 5 (Five) Minutes As Needed for Chest Pain. Take no more than 3 doses in 15 minutes., Disp: 25 tablet, Rfl: 2    pantoprazole (PROTONIX) 40 MG EC tablet, Take 1 tablet by mouth 2 (Two) Times a Day., Disp: , Rfl:     polyethylene glycol (MIRALAX) 17 GM/SCOOP powder, Take two scoops twice a day until bowel movements normalize, Disp: 578 g, Rfl: 0    psyllium (METAMUCIL) 58.6 % packet, Take 1 packet by mouth Daily As Needed (constipation)., Disp: , Rfl:     Rimegepant Sulfate (Nurtec) 75 MG tablet dispersible tablet, Take 1 tablet by mouth As Needed (Migraine)., Disp: 8 tablet, Rfl: 5    rosuvastatin (CRESTOR) 20 MG tablet, Take 1 tablet by mouth Every Night., Disp: , Rfl:     sacubitril-valsartan (Entresto)  MG tablet, Take 1 tablet by mouth 2 (Two) Times a Day., Disp: , Rfl:     Semaglutide,0.25 or 0.5MG/DOS, (Ozempic, 0.25 or 0.5 MG/DOSE,) 2 MG/1.5ML solution pen-injector, Inject  under the skin into the appropriate area as directed 1 (One) Time Per Week., Disp: , Rfl:     spironolactone (ALDACTONE) 50 MG tablet, Take 1 tablet by mouth Daily., Disp: , Rfl:     tamsulosin (FLOMAX) 0.4 MG capsule 24 hr capsule, Take 1 capsule by mouth Daily., Disp: , Rfl:   Past Medical History:   Diagnosis Date     Arthritis     Asthma     Cancer     skin    Carotid disease, bilateral     Cellulitis     right foot - on antibiotics 2023    Chest pain     Chronic diastolic congestive heart failure 2019    Chronic sinusitis     [place] bullosa     Coronary artery disease involving native coronary artery of native heart with unstable angina pectoris 2017    Deviated septum     Diabetes mellitus     Difficulty urinating     Diverticulitis     Enlarged prostate     Fatty liver     GERD (gastroesophageal reflux disease)     [place] (hard of hearing)     Does have hearing aids    Hyperlipidemia LDL goal <70 2017    Hypertrophy of nasal turbinates     Keratoderma     Kidney stone     Lung nodules     lower right lung    Migraine     Murmur, heart     Myocardial infarction     Obesity     Paroxysmal atrial fibrillation 2019    Personal history of COVID-19 2021    PONV (postoperative nausea and vomiting)     Primary hypertension 10/16/2016    Psoriasis     Seizures     Sinus congestion     Skin cancer     Sleep apnea     not using cpap    SOB (shortness of breath)     Stroke     mild weakness on right side    UTI (urinary tract infection)      Social History     Socioeconomic History    Marital status:    Tobacco Use    Smoking status: Former     Current packs/day: 0.00     Average packs/day: 1.5 packs/day for 18.0 years (27.0 ttl pk-yrs)     Types: Cigarettes, Cigars     Start date:      Quit date:      Years since quittin.2     Passive exposure: Past    Smokeless tobacco: Former     Types: Chew     Quit date:    Vaping Use    Vaping status: Never Used   Substance and Sexual Activity    Alcohol use: No     Comment: 0    Drug use: No    Sexual activity: Defer       Review of Systems   Constitutional: Negative for malaise/fatigue and weight gain.   HENT:  Negative for nosebleeds.    Cardiovascular:  Positive for chest pain and dyspnea on exertion. Negative for irregular heartbeat, leg  "swelling, near-syncope, orthopnea, palpitations and paroxysmal nocturnal dyspnea.   Respiratory:  Negative for shortness of breath.    Hematologic/Lymphatic: Does not bruise/bleed easily.   Gastrointestinal:  Negative for bloating.   Genitourinary:  Negative for hematuria.   Neurological:  Positive for dizziness. Negative for weakness.   All other systems reviewed and are negative.         Objective:     Vitals reviewed.   Constitutional:       General: Not in acute distress.     Appearance: Normal appearance. Well-developed. Obese.   Eyes:      Pupils: Pupils are equal, round, and reactive to light.   HENT:      Head: Normocephalic and atraumatic.      Nose: Nose normal.   Neck:      Vascular: No carotid bruit.   Pulmonary:      Effort: Pulmonary effort is normal. No respiratory distress.      Breath sounds: Normal breath sounds. No wheezing. No rales.   Cardiovascular:      Normal rate. Regular rhythm.      Murmurs: There is no murmur.   Edema:     Peripheral edema absent.   Abdominal:      General: There is no distension.      Palpations: Abdomen is soft.   Musculoskeletal: Normal range of motion.      Cervical back: Normal range of motion and neck supple. Skin:     General: Skin is warm.      Findings: No erythema or rash.   Neurological:      General: No focal deficit present.      Mental Status: Alert and oriented to person, place, and time.   Psychiatric:         Attention and Perception: Attention normal.         Mood and Affect: Mood normal.         Speech: Speech normal.         Behavior: Behavior normal.         Thought Content: Thought content normal.         Judgment: Judgment normal.         /64 (BP Location: Left arm, Patient Position: Sitting, Cuff Size: Adult)   Pulse 66   Ht 182.9 cm (72.01\")   Wt 114 kg (251 lb)   SpO2 96%   BMI 34.03 kg/m²       ECG 12 Lead    Date/Time: 4/4/2024 10:13 AM  Performed by: Ben Burden APRN    Authorized by: Ben Burden APRN  Comparison: compared " with previous ECG from 9/21/2023  Similar to previous ECG  Rhythm: sinus rhythm  Rate: normal  BPM: 66  Conduction: 1st degree AV block          Lab Review:       Results for orders placed during the hospital encounter of 04/17/23    Adult Transthoracic Echo Complete W/ Cont if Necessary Per Protocol    Interpretation Summary    Left ventricular systolic function is normal. Left ventricular ejection fraction appears to be 61 - 65%.    The following left ventricular wall segments are very mildly hypokinetic: apical inferior, apical septal and apex hypokinetic.    Left ventricular diastolic function is consistent with (grade II w/high LAP) pseudonormalization.    Left atrial volume is mildly increased.    Estimated right ventricular systolic pressure from tricuspid regurgitation is normal (<35 mmHg).    Normal size and function of the right ventricle.    No significant valvular pathology.    No significant change compared to prior exam from 4/29/21.      Nuclear stress 9/21/2022:   Interpretation Summary  Diaphragmatic attenuation artifact is present.  Left ventricular ejection fraction is normal. (Calculated EF = 68%).  Moderate apical and apical lateral ischemia  See bull's-eye diagram below     Holter monitor 8/15/2022:  Conclusion:   Baseline rhythm was sinus.  No significant ectopy   Single 7 beat run of supraventricular tachycardia at a maximum rate of 122 bpm at 5:04 PM      Lab Results   Component Value Date    GLUCOSE 116 (H) 09/21/2023    CALCIUM 8.9 09/21/2023     09/21/2023    K 3.7 09/21/2023    CO2 22.0 09/21/2023     (H) 09/21/2023    BUN 17 09/21/2023    CREATININE 1.20 02/14/2024    EGFRIFAFRI 27.5 (L) 08/25/2023    EGFRIFNONA 22.7 (L) 08/25/2023    BCR 18.1 09/21/2023    ANIONGAP 10.0 09/21/2023     Lab Results   Component Value Date    CHOL 105 04/18/2023    CHLPL 143 01/07/2016    TRIG 163 (H) 04/18/2023    HDL 29 (L) 04/18/2023    LDL 48 04/18/2023     I have personally reviewed echo,  nuclear stress, holter monitor, labs and past office notes prior to patients visit  Assessment:          Diagnosis Plan   1. Coronary artery disease involving native coronary artery of native heart without angina pectoris  ECG 12 Lead      2. Chronic diastolic congestive heart failure        3. Paroxysmal atrial fibrillation        4. Chronic anticoagulation        5. Primary hypertension        6. Hyperlipidemia LDL goal <70        7. Bilateral carotid artery stenosis        8. Type 2 diabetes mellitus with other circulatory complication, with long-term current use of insulin        9. HOA on CPAP        10. Aneurysm of ascending aorta without rupture        11. Class 1 obesity due to excess calories with serious comorbidity and body mass index (BMI) of 34.0 to 34.9 in adult                 Plan:       1. Chronic diastolic congestive heart failure: NYHA class II. Stage C. Compensated and stable in office today. Continue Entresto, Jardiance, spironolactone and metoprolol. Discussed to continue lasix on an as needed basis. Reviewed signs and symptoms of CHF and what to report to office with patient. Patient instructed to restrict sodium and record daily weight. Patient is to report weight gain of greater than 2 lbs overnight or 5 lbs in 1 week. Patient verbalized understanding of instructions and plan of care. Patient has CardioMEMS device, reviewed [facility] website, last reading was 11/28/2023.     2. CAD: s/p CABG x 2 LIMA to LAD and SVG to OM on 7/6/2019 per Dr Tang. Abnormal Lexiscan 9/21/2022. [facility] is scheduled for 11/09/2022. Continue aspirin, metoprolol and rosuvastatin. Continue Imdur. Discussed with patient to take NTG as needed for severe chest pain. If pain is not relieved after 3 NTG then go to ER for further evaluation.     3. Paroxysmal atrial fibrillation: No recurrence on holter monitor 9/2022. Continue metoprolol and Eliquis.     4. Chronic anticoagulation: patient is on eliquis and denies any bleeding  issues.     5. Hypertension: Controlled in office today. Continue current medications. Recommend to monitor routinely and notify office if consistently >140/90 and bring log with him to appointment    6. Hyperlipidemia: Lipid panel from 4/18/2023 showed elevated triglycerides at 163; LDL was at 48 (which is at goal of <70). Continue rosuvastatin and tricor    7. Bilateral carotid artery stenosis: Follows with vascular, Dr Echavarria    8. Type 2 DM: managed and followed by PCP. Continue jardiance. A1C 7.1 4/18/2023    9. HOA: Patient reports non compliance with CPAP. He states that he can't breathe with it on. Was seen by Dr Ibarra for inspire but was not done due to patient being high risk for surgery.    10. Thoracic aortic aneurysm: patient continues to follow up with CTS, Dr Tang for routine surveillance. Stable at 4.3 cm on CTA 2/2024.    11. Obesity: BMI is >= 30 and <35. (Class 1 Obesity). The following options were offered after discussion;: exercise counseling/recommendations and nutrition counseling/recommendations    I spent 36 minutes caring for Carlos A on this date of service. This time includes time spent by me in the following activities:preparing for the visit, reviewing tests, obtaining and/or reviewing a separately obtained history, performing a medically appropriate examination and/or evaluation , counseling and educating the patient/family/caregiver, ordering medications, tests, or procedures, and documenting information in the medical record    I spent 2 minutes on the separately reported service of EKG. This time is not included in the time used to support the E/M service also reported today.    Patient is to follow up in office in 6 months or sooner if needed.

## 2024-04-28 ENCOUNTER — APPOINTMENT (OUTPATIENT)
Dept: GENERAL RADIOLOGY | Facility: HOSPITAL | Age: 64
End: 2024-04-28
Payer: OTHER GOVERNMENT

## 2024-04-28 ENCOUNTER — HOSPITAL ENCOUNTER (EMERGENCY)
Facility: HOSPITAL | Age: 64
Discharge: HOME | End: 2024-04-28
Attending: STUDENT IN AN ORGANIZED HEALTH CARE EDUCATION/TRAINING PROGRAM | Admitting: STUDENT IN AN ORGANIZED HEALTH CARE EDUCATION/TRAINING PROGRAM
Payer: OTHER GOVERNMENT

## 2024-04-28 VITALS
HEART RATE: 74 BPM | SYSTOLIC BLOOD PRESSURE: 117 MMHG | WEIGHT: 255 LBS | HEIGHT: 72 IN | RESPIRATION RATE: 18 BRPM | TEMPERATURE: 98.3 F | BODY MASS INDEX: 34.54 KG/M2 | OXYGEN SATURATION: 99 % | DIASTOLIC BLOOD PRESSURE: 81 MMHG

## 2024-04-28 DIAGNOSIS — L97.511 SKIN ULCER OF TOE OF RIGHT FOOT, LIMITED TO BREAKDOWN OF SKIN: Primary | ICD-10-CM

## 2024-04-28 PROCEDURE — 25010000002 LIDOCAINE 1 % SOLUTION: Performed by: STUDENT IN AN ORGANIZED HEALTH CARE EDUCATION/TRAINING PROGRAM

## 2024-04-28 PROCEDURE — 99283 EMERGENCY DEPT VISIT LOW MDM: CPT

## 2024-04-28 PROCEDURE — 73630 X-RAY EXAM OF FOOT: CPT

## 2024-04-28 RX ORDER — LIDOCAINE HYDROCHLORIDE 10 MG/ML
10 INJECTION, SOLUTION INFILTRATION; PERINEURAL ONCE
Status: COMPLETED | OUTPATIENT
Start: 2024-04-28 | End: 2024-04-28

## 2024-04-28 RX ORDER — INSULIN GLARGINE 100 [IU]/ML
100 INJECTION, SOLUTION SUBCUTANEOUS DAILY
COMMUNITY

## 2024-04-28 RX ADMIN — LIDOCAINE HYDROCHLORIDE 10 ML: 10 INJECTION, SOLUTION INFILTRATION; PERINEURAL at 11:37

## 2024-04-28 NOTE — DISCHARGE INSTRUCTIONS
You should follow-up with the podiatrist regarding your foot regularly and closely.  If you develop any signs of infection like purulence from your wound, redness and swelling of her toe, redness and swelling of her foot, fever, [place] to the ER.  Otherwise continue antibiotic as prescribed.

## 2024-04-28 NOTE — ED PROVIDER NOTES
Subjective   History of Present Illness  Patient presents due to toe wound.  He has a wound on his right great toe.  Has been present for couple years.  He has seen a podiatrist and had the wound debrided a couple of times at bedside.  On Thursday he went to the [facility] where the margins were debrided.  There has been a little bit of appearance change in the skin, the wife notes brown and black crusted dry spots around the wound.  There is no new swelling.  There is some redness behind the great toe which is also a concern.  He says the ER doctor thought it was a paronychia on Thursday but the podiatrist did not think so so there was no intervention performed on it.  They see wound care and podiatry.  He has no new wounds, no redness or swelling of his toe or foot, no fevers no infectious symptoms.  Does have a history of sepsis which the wife states was due to the toe.  No history of osteomyelitis in that toe.    Review of Systems   Constitutional:  Negative for chills and fever.   Respiratory:  Negative for cough and shortness of breath.    Cardiovascular:  Negative for chest pain and palpitations.   Gastrointestinal:  Negative for abdominal pain and vomiting.   Genitourinary:  Negative for difficulty urinating and dysuria.   Neurological:  Negative for syncope and light-headedness.       Past Medical History:   Diagnosis Date    Arthritis     Asthma     Cancer     skin    Carotid disease, bilateral     Cellulitis     right foot - on antibiotics 6-    Chest pain     Chronic diastolic congestive heart failure 09/07/2019    Chronic sinusitis     Maribell bullosa     Coronary artery disease involving native coronary artery of native heart with unstable angina pectoris 01/17/2017    Deviated septum     Diabetes mellitus     Difficulty urinating     Diverticulitis     Enlarged prostate     Fatty liver     GERD (gastroesophageal reflux disease)     [place] (hard of hearing)     Does have hearing aids     Hyperlipidemia LDL goal <70 02/02/2017    Hypertrophy of nasal turbinates     Keratoderma     Kidney stone     Lung nodules     lower right lung    Migraine     Murmur, heart     Myocardial infarction     Obesity     Paroxysmal atrial fibrillation 07/11/2019    Personal history of COVID-19 07/2021    PONV (postoperative nausea and vomiting)     Primary hypertension 10/16/2016    Psoriasis     Seizures     Sinus congestion     Skin cancer     Sleep apnea     not using cpap    SOB (shortness of breath)     Stroke     mild weakness on right side    UTI (urinary tract infection)        Allergies   Allergen Reactions    Flagyl [Metronidazole] Hives    Atorvastatin Other (See Comments) and Myalgia      - LIPITOR -   Muscle cramps    Ciprofloxacin Hives      - CIPRO -        Past Surgical History:   Procedure Laterality Date    CARDIAC CATHETERIZATION  01/2016    Dr. Broadbent; widely patent previously placed stents in the left anterior descending and obstructive disease involving the diagonal branch which was treated medically    CARDIAC CATHETERIZATION N/A 07/14/2017    Procedure: Left Heart Cath;  Surgeon: Wade Ramey MD;  Location:  [facility];  Service:     CARDIAC CATHETERIZATION Left 10/15/2018    Procedure: Cardiac Catheterization/Vascular Study;  Surgeon: Wade Ramey MD;  Location:  [facility];  Service: Cardiology    CARDIAC CATHETERIZATION  10/15/2018    Procedure: Functional Flow [place];  Surgeon: Wade Ramey MD;  Location:  [facility];  Service: Cardiology    CARDIAC CATHETERIZATION N/A 10/15/2018    Procedure: Left ventriculography;  Surgeon: Wade Ramey MD;  Location:  [facility];  Service: Cardiology    CARDIAC CATHETERIZATION Left 06/26/2019    Procedure: Cardiac Catheterization/Vascular Study VEL OK  HE WILL WAIT 1 YEAR FOR SHOULDER SURGERY ;  Surgeon: Wade Ramey MD;  Location:  [facility];  Service:  Cardiology    CARDIAC CATHETERIZATION Left 04/30/2021    Procedure: Coronary angiography;  Surgeon: Sahil Llamas MD;  Location:  [facility];  Service: Cardiology;  Laterality: Left;    CARDIAC CATHETERIZATION N/A 04/30/2021    Procedure: Percutaneous Coronary Intervention;  Surgeon: Sahil Llamas MD;  Location:  [facility];  Service: Cardiology;  Laterality: N/A;    CARDIAC CATHETERIZATION N/A 11/09/2022    Procedure: Left Heart Cath with SVGs;  Surgeon: Wade Ramey MD;  Location:  [facility];  Service: Cardiology;  Laterality: N/A;    CARPAL TUNNEL RELEASE      January 2024 and pther hand February 2024    CHOLECYSTECTOMY      CHOLECYSTECTOMY WITH INTRAOPERATIVE CHOLANGIOGRAM N/A 08/01/2018    Procedure: CHOLECYSTECTOMY LAPAROSCOPIC INTRAOPERATIVE CHOLANGIOGRAM;  Surgeon: Shane Ann MD;  Location: [facility] OR;  Service: General    COLONOSCOPY N/A 07/14/2020    Procedure: COLONOSCOPY WITH ANESTHESIA;  Surgeon: Anupam Morales DO;  Location: [facility] ENDOSCOPY;  Service: Gastroenterology;  Laterality: N/A;  pre: abdominal pain  post: diverticulosis  Vinayak Correia PA    CORONARY ANGIOPLASTY      CORONARY ARTERY BYPASS GRAFT N/A 07/06/2019    Procedure: CABG X2 WITH LIMA, LEFT LEG OVH, AND PLACEMENT OF LEFT FEMORAL ARTERIAL LINE;  Surgeon: Steven Tang MD;  Location: [facility] OR;  Service: Cardiothoracic    CORONARY STENT PLACEMENT      x 6    CYSTOSCOPY TRANSURETHRAL RESECTION OF PROSTATE N/A 01/23/2023    Procedure: CYSTOSCOPY TRANSURETHRAL RESECTION OF PROSTATE;  Surgeon: Latrell Pope MD;  Location: [facility] OR;  Service: Urology;  Laterality: N/A;    ENDOSCOPIC FUNCTIONAL SINUS SURGERY (FESS) Bilateral 12/13/2017    Procedure: PROCEDURE PERFORMED:  Bilateral functional endoscopic anterior ethmoidectomy with bilateral middle meatal antrostomy Septoplasty Right kathia bullosa resection Bilateral inferior turbinate reduction via  Coblation;  Surgeon: Mayank Ibarra MD;  Location:  [facility] OR;  Service:     ENDOSCOPY N/A 07/30/2018    Procedure: ESOPHAGOGASTRODUODENOSCOPY WITH ANESTHESIA;  Surgeon: Benitez Mas MD;  Location:  [facility] ENDOSCOPY;  Service: Gastroenterology    ENDOSCOPY N/A 07/14/2020    Procedure: ESOPHAGOGASTRODUODENOSCOPY WITH ANESTHESIA;  Surgeon: Anupam Morales DO;  Location:  [facility] ENDOS[facility];  Service: Gastroenterology;  Laterality: N/A;  pre: abdominal pain  post: esophagitis  Vinayak Correia, PA    HERNIA REPAIR      x2 inguinal area    KIDNEY STONE SURGERY      KNEE ARTHROSCOPY Right 03/01/2022    Procedure: RIGHT KNEE PARTIAL LATERAL MENISCECTOMY;  Surgeon: Pedro Pablo Song MD;  Location:  [facility] OR;  Service: Orthopedics;  Laterality: Right;    KNEE SURGERY Right     OTHER SURGICAL HISTORY      urolift    PROSTATE SURGERY      Dr. Badillo - 2017    PULMONARY ARTERY PRESSURE SENSOR IMPLANT N/A 05/08/2023    Procedure: PA Pressure Sensor Implant;  Surgeon: Wade Ramey MD;  Location:  [facility];  Service: Cardiology;  Laterality: N/A;    ROTATOR CUFF REPAIR Right     SLEEP ENDOSCOPY N/A 02/27/2023    Procedure: Videosleep endoscopy;  Surgeon: Mayank Ibarra MD;  Location:  [facility] OR;  Service: ENT;  Laterality: N/A;    THUMB AMPUTATION Left     partial    TOE SURGERY      great toe       Family History   Problem Relation Age of Onset    Heart disease Father     COPD Mother     Hypertension Mother     Asthma Mother     No Known Problems Sister     Colon cancer Paternal Uncle     Prostate cancer Maternal Grandfather     No Known Problems Sister     Colon cancer Maternal Grandmother     Colon polyps Maternal Grandmother        Social History     Socioeconomic History    Marital status:    Tobacco Use    Smoking status: Former     Current packs/day: 0.00     Average packs/day: 1.5 packs/day for 18.0 years (27.0 ttl pk-yrs)     Types: Cigarettes, Cigars     Start date: 1975      Quit date:      Years since quittin.3     Passive exposure: Past    Smokeless tobacco: Former     Types: Chew     Quit date:    Vaping Use    Vaping status: Never Used   Substance and Sexual Activity    Alcohol use: No     Comment: 0    Drug use: No    Sexual activity: Defer           Objective   Physical Exam  Vitals reviewed.   Constitutional:       General: He is not in acute distress.  HENT:      Head: Normocephalic and atraumatic.   Eyes:      Extraocular Movements: Extraocular movements intact.      Conjunctiva/sclera: Conjunctivae normal.   Cardiovascular:      Pulses: Normal pulses.      Heart sounds: Normal heart sounds.   Pulmonary:      Effort: Pulmonary effort is normal. No respiratory distress.   Abdominal:      General: Abdomen is flat. There is no distension.   Musculoskeletal:      Cervical back: Normal range of motion and neck supple.   Skin:     General: Skin is warm and dry.      Comments: Mild redness and swelling proximal to the right great toenail consistent with a paronychia.  No evidence of felon.  Wound margins are clean or other acute toe without evidence of infection such as erythema purulence induration or warmth.  He does have some crusting around the wound margin but this is not consistent with gross purulent infection.  Nonischemic appearing toe with cap refill intact.   Neurological:      General: No focal deficit present.      Mental Status: He is alert. Mental status is at baseline.   Psychiatric:         Behavior: Behavior normal.         Thought Content: Thought content normal.         Incision & Drainage    Date/Time: 2024 12:01 PM    Performed by: Dewayne Pandey MD  Authorized by: Dewayne Pandey MD    Consent:     Consent obtained:  Verbal    Consent given by:  Patient    Risks discussed:  Bleeding (poor wound healing)  Location:     Indications for incision and drainage: paronychia.    Location: R great toe.  Pre-procedure details:      Procedure prep: alcohol.  Sedation:     Sedation type:  None  Anesthesia:     Anesthesia method:  Local infiltration    Local anesthetic:  Lidocaine 1% w/o epi  Procedure type:     Complexity:  Simple  Procedure details:     Incision types:  Stab incision    Incision depth:  Dermal    Drainage:  Bloody    Drainage amount:  Scant  Post-procedure details:     Procedure completion:  Tolerated well, no immediate complications             ED Course                                             Medical Decision Making  Risk  Prescription drug management.      Carlos A Boyer Jr. is a 64 y.o. male with PMH above who presents to the Emergency Department with toe wound. Likely paronychia; will trial stab incision.  Plantar surface wound has clean margins without evidence of purulence.  Toe is overall benign appearing besides a likely paronychia.  In the first clinical image the [place] of light does seem to cut off at the distal tip of the toe and in the image provide it does look dark but this is not ischemic tissue this is merely the shadow from the nail.  Do not think laboratory studies would change his management.  He will need outpatient podiatry follow-up and I made a referral for this        ED Course:   -I&D produced scant blood, the mild fluctuance behind the nail improved, but was not grossly purulent. I did opt for a conservative shallow stab incision d/t concern for wound healing.  Patient will continue on Augmentin.  He has a wound culture of the wound that is pending that he can follow-up with his podiatrist.  He would prefer to see a podiatrist here so I made a referral here for this.  Normal vital signs no signs of sepsis, foot and toe do not look clinically infected, I do not think that laboratory stays to be contributory.  Did discuss the need for very close outpatient follow-up, and patient understands and agrees.  His toe exam, with a small black areas with white describes, are not consistent with embolic  showering or ischemic infarcted toe.  His cap refill is less than 2 seconds and is very good.  This is instead some scabbing around the wound.  He does not have evidence of wet gangrene or acute ischemic limb and does not have any neurovascular changes of the foot. XR reassuring.      Final diagnosis: toe wound    All questions answered. Patient/family was understanding and in agreement with today's assessment and plan. The patient was monitored during their stay in the ED and dispositioned without acute event.    Electronically signed by:  Dewayne Pandey MD 4/28/2024 12:06 CDT      Note: Dragon medical dictation software was used in the creation of this note.        Final diagnoses:   Skin ulcer of toe of right foot, limited to breakdown of skin       ED Disposition  ED Disposition       ED Disposition   Discharge    Condition   Stable    Comment   --               Vinayak Correia PA  [address]  [facility] [address]  [phone]          [facility]  [address], [address]  [facility] [address] [phone]             Medication List      No changes were made to your prescriptions during this visit.            Dewayne Pandey MD  04/28/24 [id number]

## 2024-05-14 ENCOUNTER — LAB REQUISITION (OUTPATIENT)
Dept: LAB | Facility: HOSPITAL | Age: 64
End: 2024-05-14
Payer: MEDICARE

## 2024-05-14 ENCOUNTER — LAB (OUTPATIENT)
Dept: LAB | Facility: HOSPITAL | Age: 64
End: 2024-05-14
Payer: MEDICARE

## 2024-05-14 ENCOUNTER — TRANSCRIBE ORDERS (OUTPATIENT)
Dept: ADMINISTRATIVE | Facility: HOSPITAL | Age: 64
End: 2024-05-14
Payer: MEDICARE

## 2024-05-14 ENCOUNTER — OFFICE VISIT (OUTPATIENT)
Dept: WOUND CARE | Facility: HOSPITAL | Age: 64
End: 2024-05-14
Payer: MEDICARE

## 2024-05-14 DIAGNOSIS — E11.69 DIABETIC FOOT ULCER WITH OSTEOMYELITIS: ICD-10-CM

## 2024-05-14 DIAGNOSIS — L97.509 DIABETIC FOOT ULCER WITH OSTEOMYELITIS: Primary | ICD-10-CM

## 2024-05-14 DIAGNOSIS — E11.621 TYPE 2 DIABETES MELLITUS WITH FOOT ULCER (CODE): ICD-10-CM

## 2024-05-14 DIAGNOSIS — E11.621 DIABETIC FOOT ULCER WITH OSTEOMYELITIS: Primary | ICD-10-CM

## 2024-05-14 DIAGNOSIS — E11.69 DIABETIC FOOT ULCER WITH OSTEOMYELITIS: Primary | ICD-10-CM

## 2024-05-14 DIAGNOSIS — M86.9 DIABETIC FOOT ULCER WITH OSTEOMYELITIS: Primary | ICD-10-CM

## 2024-05-14 DIAGNOSIS — L97.509 DIABETIC FOOT ULCER WITH OSTEOMYELITIS: ICD-10-CM

## 2024-05-14 DIAGNOSIS — M86.9 DIABETIC FOOT ULCER WITH OSTEOMYELITIS: ICD-10-CM

## 2024-05-14 DIAGNOSIS — E11.621 DIABETIC FOOT ULCER WITH OSTEOMYELITIS: ICD-10-CM

## 2024-05-14 LAB
ALBUMIN SERPL-MCNC: 4.4 G/DL (ref 3.5–5.2)
ALBUMIN/GLOB SERPL: 1.5 G/DL
ALP SERPL-CCNC: 80 U/L (ref 39–117)
ALT SERPL W P-5'-P-CCNC: 27 U/L (ref 1–41)
ANION GAP SERPL CALCULATED.3IONS-SCNC: 10 MMOL/L (ref 5–15)
AST SERPL-CCNC: 25 U/L (ref 1–40)
BASOPHILS # BLD AUTO: 0.02 10*3/MM3 (ref 0–0.2)
BASOPHILS NFR BLD AUTO: 0.4 % (ref 0–1.5)
BILIRUB SERPL-MCNC: 0.8 MG/DL (ref 0–1.2)
BUN SERPL-MCNC: 21 MG/DL (ref 8–23)
BUN/CREAT SERPL: 17.8 (ref 7–25)
CALCIUM SPEC-SCNC: 9.5 MG/DL (ref 8.6–10.5)
CHLORIDE SERPL-SCNC: 109 MMOL/L (ref 98–107)
CO2 SERPL-SCNC: 22 MMOL/L (ref 22–29)
CREAT SERPL-MCNC: 1.18 MG/DL (ref 0.76–1.27)
CRP SERPL-MCNC: <0.3 MG/DL (ref 0–0.5)
DEPRECATED RDW RBC AUTO: 45.8 FL (ref 37–54)
EGFRCR SERPLBLD CKD-EPI 2021: 68.1 ML/MIN/1.73
EOSINOPHIL # BLD AUTO: 0.17 10*3/MM3 (ref 0–0.4)
EOSINOPHIL NFR BLD AUTO: 3.2 % (ref 0.3–6.2)
ERYTHROCYTE [DISTWIDTH] IN BLOOD BY AUTOMATED COUNT: 13.4 % (ref 12.3–15.4)
ERYTHROCYTE [SEDIMENTATION RATE] IN BLOOD: 5 MM/HR (ref 0–20)
GLOBULIN UR ELPH-MCNC: 2.9 GM/DL
GLUCOSE SERPL-MCNC: 129 MG/DL (ref 65–99)
HBA1C MFR BLD: 6.8 % (ref 4.8–5.6)
HCT VFR BLD AUTO: 46 % (ref 37.5–51)
HGB BLD-MCNC: 14.5 G/DL (ref 13–17.7)
IMM GRANULOCYTES # BLD AUTO: 0.03 10*3/MM3 (ref 0–0.05)
IMM GRANULOCYTES NFR BLD AUTO: 0.6 % (ref 0–0.5)
LYMPHOCYTES # BLD AUTO: 1.83 10*3/MM3 (ref 0.7–3.1)
LYMPHOCYTES NFR BLD AUTO: 34.3 % (ref 19.6–45.3)
MCH RBC QN AUTO: 29.4 PG (ref 26.6–33)
MCHC RBC AUTO-ENTMCNC: 31.5 G/DL (ref 31.5–35.7)
MCV RBC AUTO: 93.3 FL (ref 79–97)
MONOCYTES # BLD AUTO: 0.49 10*3/MM3 (ref 0.1–0.9)
MONOCYTES NFR BLD AUTO: 9.2 % (ref 5–12)
NEUTROPHILS NFR BLD AUTO: 2.79 10*3/MM3 (ref 1.7–7)
NEUTROPHILS NFR BLD AUTO: 52.3 % (ref 42.7–76)
PLATELET # BLD AUTO: 141 10*3/MM3 (ref 140–450)
PMV BLD AUTO: 10.8 FL (ref 6–12)
POTASSIUM SERPL-SCNC: 4.9 MMOL/L (ref 3.5–5.2)
PREALB SERPL-MCNC: 26.6 MG/DL (ref 20–40)
PROT SERPL-MCNC: 7.3 G/DL (ref 6–8.5)
RBC # BLD AUTO: 4.93 10*6/MM3 (ref 4.14–5.8)
SODIUM SERPL-SCNC: 141 MMOL/L (ref 136–145)
WBC NRBC COR # BLD AUTO: 5.33 10*3/MM3 (ref 3.4–10.8)

## 2024-05-14 PROCEDURE — 87077 CULTURE AEROBIC IDENTIFY: CPT

## 2024-05-14 PROCEDURE — G0463 HOSPITAL OUTPT CLINIC VISIT: HCPCS

## 2024-05-14 PROCEDURE — 36415 COLL VENOUS BLD VENIPUNCTURE: CPT

## 2024-05-14 PROCEDURE — 87070 CULTURE OTHR SPECIMN AEROBIC: CPT

## 2024-05-14 PROCEDURE — 85652 RBC SED RATE AUTOMATED: CPT

## 2024-05-14 PROCEDURE — 83036 HEMOGLOBIN GLYCOSYLATED A1C: CPT

## 2024-05-14 PROCEDURE — 85025 COMPLETE CBC W/AUTO DIFF WBC: CPT

## 2024-05-14 PROCEDURE — 87075 CULTR BACTERIA EXCEPT BLOOD: CPT

## 2024-05-14 PROCEDURE — 84134 ASSAY OF PREALBUMIN: CPT

## 2024-05-14 PROCEDURE — 80053 COMPREHEN METABOLIC PANEL: CPT

## 2024-05-14 PROCEDURE — 87186 SC STD MICRODIL/AGAR DIL: CPT

## 2024-05-14 PROCEDURE — 86140 C-REACTIVE PROTEIN: CPT

## 2024-05-14 PROCEDURE — 87176 TISSUE HOMOGENIZATION CULTR: CPT

## 2024-05-14 PROCEDURE — 87205 SMEAR GRAM STAIN: CPT

## 2024-05-17 LAB
BACTERIA SPEC AEROBE CULT: ABNORMAL
BACTERIA SPEC AEROBE CULT: ABNORMAL
GRAM STN SPEC: ABNORMAL

## 2024-05-19 LAB — BACTERIA SPEC ANAEROBE CULT: NORMAL

## 2024-05-20 ENCOUNTER — HOSPITAL ENCOUNTER (OUTPATIENT)
Facility: HOSPITAL | Age: 64
Setting detail: OBSERVATION
Discharge: HOME | End: 2024-05-21
Attending: EMERGENCY MEDICINE | Admitting: FAMILY MEDICINE
Payer: OTHER GOVERNMENT

## 2024-05-20 ENCOUNTER — APPOINTMENT (OUTPATIENT)
Dept: GENERAL RADIOLOGY | Facility: HOSPITAL | Age: 64
End: 2024-05-20
Payer: OTHER GOVERNMENT

## 2024-05-20 DIAGNOSIS — R06.00 DYSPNEA, UNSPECIFIED TYPE: ICD-10-CM

## 2024-05-20 DIAGNOSIS — R07.9 CHEST PAIN, UNSPECIFIED TYPE: Primary | ICD-10-CM

## 2024-05-20 LAB
ALBUMIN SERPL-MCNC: 4.2 G/DL (ref 3.5–5.2)
ALBUMIN/GLOB SERPL: 1.4 G/DL
ALP SERPL-CCNC: 74 U/L (ref 39–117)
ALT SERPL W P-5'-P-CCNC: 22 U/L (ref 1–41)
ANION GAP SERPL CALCULATED.3IONS-SCNC: 11 MMOL/L (ref 5–15)
AST SERPL-CCNC: 21 U/L (ref 1–40)
B PARAPERT DNA SPEC QL NAA+PROBE: NOT DETECTED
B PERT DNA SPEC QL NAA+PROBE: NOT DETECTED
BASOPHILS # BLD AUTO: 0.08 10*3/MM3 (ref 0–0.2)
BASOPHILS NFR BLD AUTO: 1.3 % (ref 0–1.5)
BILIRUB SERPL-MCNC: 0.6 MG/DL (ref 0–1.2)
BUN SERPL-MCNC: 22 MG/DL (ref 8–23)
BUN/CREAT SERPL: 17.1 (ref 7–25)
C PNEUM DNA NPH QL NAA+NON-PROBE: NOT DETECTED
CALCIUM SPEC-SCNC: 9.6 MG/DL (ref 8.6–10.5)
CHLORIDE SERPL-SCNC: 107 MMOL/L (ref 98–107)
CO2 SERPL-SCNC: 21 MMOL/L (ref 22–29)
CREAT SERPL-MCNC: 1.29 MG/DL (ref 0.76–1.27)
D DIMER PPP FEU-MCNC: 0.35 MCGFEU/ML (ref 0–0.66)
DEPRECATED RDW RBC AUTO: 44.6 FL (ref 37–54)
EGFRCR SERPLBLD CKD-EPI 2021: 61.2 ML/MIN/1.73
EOSINOPHIL # BLD AUTO: 0.16 10*3/MM3 (ref 0–0.4)
EOSINOPHIL NFR BLD AUTO: 2.6 % (ref 0.3–6.2)
ERYTHROCYTE [DISTWIDTH] IN BLOOD BY AUTOMATED COUNT: 13.4 % (ref 12.3–15.4)
FLUAV SUBTYP SPEC NAA+PROBE: NOT DETECTED
FLUBV RNA ISLT QL NAA+PROBE: NOT DETECTED
GEN 5 2HR TROPONIN T REFLEX: 13 NG/L
GLOBULIN UR ELPH-MCNC: 2.9 GM/DL
GLUCOSE SERPL-MCNC: 119 MG/DL (ref 65–99)
HADV DNA SPEC NAA+PROBE: NOT DETECTED
HCOV 229E RNA SPEC QL NAA+PROBE: NOT DETECTED
HCOV HKU1 RNA SPEC QL NAA+PROBE: NOT DETECTED
HCOV NL63 RNA SPEC QL NAA+PROBE: NOT DETECTED
HCOV OC43 RNA SPEC QL NAA+PROBE: NOT DETECTED
HCT VFR BLD AUTO: 42.3 % (ref 37.5–51)
HGB BLD-MCNC: 13.8 G/DL (ref 13–17.7)
HMPV RNA NPH QL NAA+NON-PROBE: NOT DETECTED
HOLD SPECIMEN: NORMAL
HOLD SPECIMEN: NORMAL
HPIV1 RNA ISLT QL NAA+PROBE: NOT DETECTED
HPIV2 RNA SPEC QL NAA+PROBE: NOT DETECTED
HPIV3 RNA NPH QL NAA+PROBE: NOT DETECTED
HPIV4 P GENE NPH QL NAA+PROBE: NOT DETECTED
IMM GRANULOCYTES # BLD AUTO: 0.23 10*3/MM3 (ref 0–0.05)
IMM GRANULOCYTES NFR BLD AUTO: 3.8 % (ref 0–0.5)
LYMPHOCYTES # BLD AUTO: 2.47 10*3/MM3 (ref 0.7–3.1)
LYMPHOCYTES NFR BLD AUTO: 40.6 % (ref 19.6–45.3)
M PNEUMO IGG SER IA-ACNC: NOT DETECTED
MCH RBC QN AUTO: 29.7 PG (ref 26.6–33)
MCHC RBC AUTO-ENTMCNC: 32.6 G/DL (ref 31.5–35.7)
MCV RBC AUTO: 91.2 FL (ref 79–97)
MONOCYTES # BLD AUTO: 0.54 10*3/MM3 (ref 0.1–0.9)
MONOCYTES NFR BLD AUTO: 8.9 % (ref 5–12)
NEUTROPHILS NFR BLD AUTO: 2.6 10*3/MM3 (ref 1.7–7)
NEUTROPHILS NFR BLD AUTO: 42.8 % (ref 42.7–76)
NT-PROBNP SERPL-MCNC: 82.8 PG/ML (ref 0–900)
PLATELET # BLD AUTO: 102 10*3/MM3 (ref 140–450)
PMV BLD AUTO: 11.8 FL (ref 6–12)
POTASSIUM SERPL-SCNC: 4.5 MMOL/L (ref 3.5–5.2)
PROT SERPL-MCNC: 7.1 G/DL (ref 6–8.5)
RBC # BLD AUTO: 4.64 10*6/MM3 (ref 4.14–5.8)
RHINOVIRUS RNA SPEC NAA+PROBE: NOT DETECTED
RSV RNA NPH QL NAA+NON-PROBE: NOT DETECTED
SARS-COV-2 RNA NPH QL NAA+NON-PROBE: NOT DETECTED
SODIUM SERPL-SCNC: 139 MMOL/L (ref 136–145)
TROPONIN T DELTA: 1 NG/L
TROPONIN T SERPL HS-MCNC: 12 NG/L
WBC NRBC COR # BLD AUTO: 6.08 10*3/MM3 (ref 3.4–10.8)
WHOLE BLOOD HOLD COAG: NORMAL
WHOLE BLOOD HOLD SPECIMEN: NORMAL

## 2024-05-20 PROCEDURE — 85025 COMPLETE CBC W/AUTO DIFF WBC: CPT

## 2024-05-20 PROCEDURE — 93010 ELECTROCARDIOGRAM REPORT: CPT | Performed by: HOSPITALIST

## 2024-05-20 PROCEDURE — 99284 EMERGENCY DEPT VISIT MOD MDM: CPT

## 2024-05-20 PROCEDURE — 36415 COLL VENOUS BLD VENIPUNCTURE: CPT

## 2024-05-20 PROCEDURE — 80053 COMPREHEN METABOLIC PANEL: CPT

## 2024-05-20 PROCEDURE — 0202U NFCT DS 22 TRGT SARS-COV-2: CPT

## 2024-05-20 PROCEDURE — 84484 ASSAY OF TROPONIN QUANT: CPT

## 2024-05-20 PROCEDURE — 85379 FIBRIN DEGRADATION QUANT: CPT | Performed by: EMERGENCY MEDICINE

## 2024-05-20 PROCEDURE — 83880 ASSAY OF NATRIURETIC PEPTIDE: CPT | Performed by: EMERGENCY MEDICINE

## 2024-05-20 PROCEDURE — 71045 X-RAY EXAM CHEST 1 VIEW: CPT

## 2024-05-20 PROCEDURE — 93005 ELECTROCARDIOGRAM TRACING: CPT

## 2024-05-20 PROCEDURE — G0378 HOSPITAL OBSERVATION PER HR: HCPCS

## 2024-05-20 RX ORDER — LEVETIRACETAM 500 MG/1
2000 TABLET ORAL NIGHTLY
Status: DISCONTINUED | OUTPATIENT
Start: 2024-05-20 | End: 2024-05-21 | Stop reason: HOSPADM

## 2024-05-20 RX ORDER — ACETAMINOPHEN 650 MG/1
650 SUPPOSITORY RECTAL EVERY 4 HOURS PRN
Status: DISCONTINUED | OUTPATIENT
Start: 2024-05-20 | End: 2024-05-21 | Stop reason: HOSPADM

## 2024-05-20 RX ORDER — BISACODYL 5 MG/1
5 TABLET, DELAYED RELEASE ORAL DAILY PRN
Status: DISCONTINUED | OUTPATIENT
Start: 2024-05-20 | End: 2024-05-21 | Stop reason: HOSPADM

## 2024-05-20 RX ORDER — PANTOPRAZOLE SODIUM 40 MG/1
40 TABLET, DELAYED RELEASE ORAL
Status: DISCONTINUED | OUTPATIENT
Start: 2024-05-21 | End: 2024-05-21 | Stop reason: HOSPADM

## 2024-05-20 RX ORDER — SODIUM CHLORIDE 9 MG/ML
40 INJECTION, SOLUTION INTRAVENOUS AS NEEDED
Status: DISCONTINUED | OUTPATIENT
Start: 2024-05-20 | End: 2024-05-21 | Stop reason: HOSPADM

## 2024-05-20 RX ORDER — LEVETIRACETAM 500 MG/1
1500 TABLET ORAL DAILY
Status: DISCONTINUED | OUTPATIENT
Start: 2024-05-21 | End: 2024-05-21 | Stop reason: HOSPADM

## 2024-05-20 RX ORDER — METOPROLOL TARTRATE 100 MG/1
100 TABLET ORAL 2 TIMES DAILY
Status: DISCONTINUED | OUTPATIENT
Start: 2024-05-20 | End: 2024-05-21 | Stop reason: HOSPADM

## 2024-05-20 RX ORDER — ONDANSETRON 2 MG/ML
4 INJECTION INTRAMUSCULAR; INTRAVENOUS EVERY 6 HOURS PRN
Status: DISCONTINUED | OUTPATIENT
Start: 2024-05-20 | End: 2024-05-21 | Stop reason: HOSPADM

## 2024-05-20 RX ORDER — AMOXICILLIN 250 MG
2 CAPSULE ORAL 2 TIMES DAILY PRN
Status: DISCONTINUED | OUTPATIENT
Start: 2024-05-20 | End: 2024-05-21 | Stop reason: HOSPADM

## 2024-05-20 RX ORDER — SODIUM CHLORIDE 0.9 % (FLUSH) 0.9 %
10 SYRINGE (ML) INJECTION AS NEEDED
Status: DISCONTINUED | OUTPATIENT
Start: 2024-05-20 | End: 2024-05-21 | Stop reason: HOSPADM

## 2024-05-20 RX ORDER — BISACODYL 10 MG
10 SUPPOSITORY, RECTAL RECTAL DAILY PRN
Status: DISCONTINUED | OUTPATIENT
Start: 2024-05-20 | End: 2024-05-21 | Stop reason: HOSPADM

## 2024-05-20 RX ORDER — SODIUM CHLORIDE 0.9 % (FLUSH) 0.9 %
10 SYRINGE (ML) INJECTION EVERY 12 HOURS SCHEDULED
Status: DISCONTINUED | OUTPATIENT
Start: 2024-05-20 | End: 2024-05-21 | Stop reason: HOSPADM

## 2024-05-20 RX ORDER — ROSUVASTATIN CALCIUM 20 MG/1
20 TABLET, COATED ORAL NIGHTLY
Status: DISCONTINUED | OUTPATIENT
Start: 2024-05-20 | End: 2024-05-21 | Stop reason: HOSPADM

## 2024-05-20 RX ORDER — ACETAMINOPHEN 325 MG/1
650 TABLET ORAL EVERY 4 HOURS PRN
Status: DISCONTINUED | OUTPATIENT
Start: 2024-05-20 | End: 2024-05-21 | Stop reason: HOSPADM

## 2024-05-20 RX ORDER — TAMSULOSIN HYDROCHLORIDE 0.4 MG/1
0.4 CAPSULE ORAL DAILY
Status: DISCONTINUED | OUTPATIENT
Start: 2024-05-21 | End: 2024-05-21 | Stop reason: HOSPADM

## 2024-05-20 RX ORDER — LAMOTRIGINE 100 MG/1
200 TABLET ORAL 2 TIMES DAILY
Status: DISCONTINUED | OUTPATIENT
Start: 2024-05-20 | End: 2024-05-21 | Stop reason: HOSPADM

## 2024-05-20 RX ORDER — ASPIRIN 81 MG/1
324 TABLET, CHEWABLE ORAL ONCE
Status: COMPLETED | OUTPATIENT
Start: 2024-05-20 | End: 2024-05-20

## 2024-05-20 RX ORDER — NITROGLYCERIN 0.4 MG/1
0.4 TABLET SUBLINGUAL
Status: DISCONTINUED | OUTPATIENT
Start: 2024-05-20 | End: 2024-05-21 | Stop reason: HOSPADM

## 2024-05-20 RX ORDER — ASPIRIN 81 MG/1
81 TABLET, CHEWABLE ORAL DAILY
Status: DISCONTINUED | OUTPATIENT
Start: 2024-05-21 | End: 2024-05-21 | Stop reason: HOSPADM

## 2024-05-20 RX ORDER — SPIRONOLACTONE 50 MG/1
50 TABLET, FILM COATED ORAL DAILY
Status: DISCONTINUED | OUTPATIENT
Start: 2024-05-21 | End: 2024-05-21 | Stop reason: HOSPADM

## 2024-05-20 RX ORDER — GABAPENTIN 300 MG/1
300 CAPSULE ORAL NIGHTLY
Status: DISCONTINUED | OUTPATIENT
Start: 2024-05-20 | End: 2024-05-21 | Stop reason: HOSPADM

## 2024-05-20 RX ORDER — ONDANSETRON 4 MG/1
4 TABLET, ORALLY DISINTEGRATING ORAL EVERY 6 HOURS PRN
Status: DISCONTINUED | OUTPATIENT
Start: 2024-05-20 | End: 2024-05-21 | Stop reason: HOSPADM

## 2024-05-20 RX ORDER — ALBUTEROL SULFATE 90 UG/1
2 AEROSOL, METERED RESPIRATORY (INHALATION) EVERY 6 HOURS PRN
Status: DISCONTINUED | OUTPATIENT
Start: 2024-05-20 | End: 2024-05-20 | Stop reason: CLARIF

## 2024-05-20 RX ORDER — ISOSORBIDE MONONITRATE 60 MG/1
120 TABLET, EXTENDED RELEASE ORAL DAILY
Status: DISCONTINUED | OUTPATIENT
Start: 2024-05-21 | End: 2024-05-21 | Stop reason: HOSPADM

## 2024-05-20 RX ORDER — ALBUTEROL SULFATE 2.5 MG/3ML
2.5 SOLUTION RESPIRATORY (INHALATION) EVERY 6 HOURS PRN
Status: DISCONTINUED | OUTPATIENT
Start: 2024-05-20 | End: 2024-05-21 | Stop reason: HOSPADM

## 2024-05-20 RX ORDER — POLYETHYLENE GLYCOL 3350 17 G/17G
17 POWDER, FOR SOLUTION ORAL DAILY PRN
Status: DISCONTINUED | OUTPATIENT
Start: 2024-05-20 | End: 2024-05-21 | Stop reason: HOSPADM

## 2024-05-20 RX ORDER — ACETAMINOPHEN 160 MG/5ML
650 SOLUTION ORAL EVERY 4 HOURS PRN
Status: DISCONTINUED | OUTPATIENT
Start: 2024-05-20 | End: 2024-05-21 | Stop reason: HOSPADM

## 2024-05-20 RX ADMIN — ASPIRIN 324 MG: 81 TABLET, CHEWABLE ORAL at 19:53

## 2024-05-20 NOTE — Clinical Note
Level of Care: Telemetry [5]   Diagnosis: Chest pain [393840]   Admitting Physician: ELGIN UREÑA [[id number]]   Attending Physician: ELGIN UREÑA [[id number]]

## 2024-05-20 NOTE — PROGRESS NOTES
Subjective    Mr. Boyer is 64 y.o. male    Chief Complaint: BPH    History of Present Illness  Patient here for follow-up he has history of BPH and lower urinary tract symptoms he has had a previous TURP and UroLift so overall his stream and flow are good.  Primarily has urgency frequency nocturia he stated the Gemtesa worked for several weeks and then the last week or 2 that did not work as well he is not taking currently.  Has not had or tried any other medications other than Gemtesa.      The following portions of the patient's history were reviewed and updated as appropriate: allergies, current medications, past family history, past medical history, past social history, past surgical history and problem list.    Review of Systems   Constitutional:  Negative for chills and fever.   Gastrointestinal:  Negative for abdominal pain, anal bleeding and blood in stool.   Genitourinary:  Positive for frequency and urgency. Negative for dysuria and hematuria.         Current Outpatient Medications:     acetaminophen (TYLENOL) 325 MG tablet, Take 1 tablet by mouth Every 6 (Six) Hours As Needed for Mild Pain., Disp: , Rfl:     albuterol (PROVENTIL HFA;VENTOLIN HFA) 108 (90 BASE) MCG/ACT inhaler, Inhale 2 puffs Every 6 (Six) Hours As Needed for Wheezing., Disp: , Rfl:     apixaban (ELIQUIS) 5 MG tablet tablet, Take 1 tablet by mouth 2 (Two) Times a Day., Disp: , Rfl:     Aspirin 81 MG capsule, Take 81 mg by mouth Daily. Dr. Ramey or Dr. Tang, Disp: , Rfl:     calcium polycarbophil (FIBERCON) 625 MG tablet, Take 1 tablet by mouth Daily As Needed., Disp: , Rfl:     carboxymethylcellulose (REFRESH PLUS) 0.5 % solution, Administer 1 drop to both eyes 4 (Four) Times a Day As Needed for Dry Eyes., Disp: , Rfl:     empagliflozin (JARDIANCE) 10 MG tablet tablet, Take 1 tablet by mouth Daily., Disp: , Rfl:     ezetimibe (ZETIA) 10 MG tablet, Take 1 tablet by mouth Daily., Disp: , Rfl:     fluticasone (FLONASE) 50 MCG/ACT nasal  spray, 2 sprays into the nostril(s) as directed by provider Daily., Disp: , Rfl:     furosemide (Lasix) 40 MG tablet, Take 1 tablet by mouth Daily As Needed (take as needed for weight gain more than 2 pounds in a day or more than than 3 pounds in 2 days.)., Disp: 90 tablet, Rfl: 1    gabapentin (NEURONTIN) 300 MG capsule, Take 1 capsule by mouth Every Night., Disp: , Rfl:     guaiFENesin (MUCINEX) 600 MG 12 hr tablet, Take 2 tablets by mouth Daily As Needed for Congestion., Disp: , Rfl:     insulin aspart (novoLOG FLEXPEN) 100 UNIT/ML solution pen-injector sc pen, Inject 40 Units under the skin into the appropriate area as directed 3 (Three) Times a Day With Meals. Can use 5 to 6 more units if needed in early evening., Disp: , Rfl:     INSULIN GLARGINE-YFGN SC, Inject 100 Units under the skin into the appropriate area as directed Every Night., Disp: , Rfl:     INSULIN GLARGINE-YFGN SC, Inject 80 Units under the skin into the appropriate area as directed Daily., Disp: , Rfl:     isosorbide mononitrate (IMDUR) 120 MG 24 hr tablet, Take 1 tablet by mouth Daily., Disp: 90 tablet, Rfl: 3    lamoTRIgine (LaMICtal) 200 MG tablet, Take 1 tablet by mouth 2 (Two) Times a Day., Disp: 180 tablet, Rfl: 1    levETIRAcetam (KEPPRA) 500 MG tablet, Take 3 tablets by mouth Every Morning., Disp: 90 tablet, Rfl: 5    levETIRAcetam (KEPPRA) 500 MG tablet, Take 4 tablets by mouth Every Night., Disp: 120 tablet, Rfl: 5    metFORMIN (GLUCOPHAGE) 1000 MG tablet, Take 1 tablet by mouth 2 (Two) Times a Day With Meals., Disp: , Rfl:     metoprolol tartrate (LOPRESSOR) 100 MG tablet, Take 1 tablet by mouth 2 (Two) Times a Day., Disp: , Rfl:     Multiple Vitamin (MULTI VITAMIN PO), Take 1 tablet by mouth Daily., Disp: , Rfl:     nitroglycerin (NITROSTAT) 0.4 MG SL tablet, Place 1 tablet under the tongue Every 5 (Five) Minutes As Needed for Chest Pain. Take no more than 3 doses in 15 minutes., Disp: 25 tablet, Rfl: 2    pantoprazole (PROTONIX) 40  MG EC tablet, Take 1 tablet by mouth 2 (Two) Times a Day., Disp: , Rfl:     polyethylene glycol (MIRALAX) 17 GM/SCOOP powder, Take two scoops twice a day until bowel movements normalize, Disp: 578 g, Rfl: 0    psyllium (METAMUCIL) 58.6 % packet, Take 1 packet by mouth Daily As Needed (constipation)., Disp: , Rfl:     Rimegepant Sulfate (Nurtec) 75 MG tablet dispersible tablet, Take 1 tablet by mouth 3 (Three) Times a Day As Needed (migraine)., Disp: , Rfl:     rosuvastatin (CRESTOR) 40 MG tablet, Take 0.5 tablets by mouth Every Night., Disp: , Rfl:     sacubitril-valsartan (Entresto)  MG tablet, Take 1 tablet by mouth 2 (Two) Times a Day., Disp: , Rfl:     Semaglutide,0.25 or 0.5MG/DOS, (Ozempic, 0.25 or 0.5 MG/DOSE,) 2 MG/1.5ML solution pen-injector, Inject 0.5 mg under the skin into the appropriate area as directed 1 (One) Time Per Week. Monday, Disp: , Rfl:     spironolactone (ALDACTONE) 50 MG tablet, Take 1 tablet by mouth Daily., Disp: , Rfl:     tamsulosin (FLOMAX) 0.4 MG capsule 24 hr capsule, Take 1 capsule by mouth Daily., Disp: , Rfl:     Vibegron (Gemtesa) 75 MG tablet, Take 1 tablet by mouth Daily for 42 days., Disp: 42 tablet, Rfl: 0    Past Medical History:   Diagnosis Date    Arthritis     Asthma     Cancer     skin    Carotid disease, bilateral     Cellulitis     right foot - on antibiotics 6-    Chest pain     Chronic diastolic congestive heart failure 09/07/2019    Chronic sinusitis     Maribell bullosa     Coronary artery disease involving native coronary artery of native heart with unstable angina pectoris 01/17/2017    Deviated septum     Diabetes mellitus     Difficulty urinating     Diverticulitis     Enlarged prostate     Fatty liver     GERD (gastroesophageal reflux disease)     [place] (hard of hearing)     Does have hearing aids    Hyperlipidemia LDL goal <70 02/02/2017    Hypertrophy of nasal turbinates     Keratoderma     Kidney stone     Lung nodules     lower right lung     Migraine     Murmur, heart     Myocardial infarction     Obesity     Paroxysmal atrial fibrillation 07/11/2019    Personal history of COVID-19 07/2021    PONV (postoperative nausea and vomiting)     Primary hypertension 10/16/2016    Psoriasis     Seizures     Sinus congestion     Skin cancer     Sleep apnea     not using cpap    SOB (shortness of breath)     Stroke     mild weakness on right side    UTI (urinary tract infection)        Past Surgical History:   Procedure Laterality Date    CARDIAC CATHETERIZATION  01/2016    Dr. Broadbent; widely patent previously placed stents in the left anterior descending and obstructive disease involving the diagonal branch which was treated medically    CARDIAC CATHETERIZATION N/A 07/14/2017    Procedure: Left Heart Cath;  Surgeon: Wade Ramey MD;  Location:  [facility];  Service:     CARDIAC CATHETERIZATION Left 10/15/2018    Procedure: Cardiac Catheterization/Vascular Study;  Surgeon: Wade Ramey MD;  Location:  [facility];  Service: Cardiology    CARDIAC CATHETERIZATION  10/15/2018    Procedure: Functional Flow [place];  Surgeon: Wade Ramey MD;  Location:  [facility];  Service: Cardiology    CARDIAC CATHETERIZATION N/A 10/15/2018    Procedure: Left ventriculography;  Surgeon: Wade Ramey MD;  Location:  [facility];  Service: Cardiology    CARDIAC CATHETERIZATION Left 06/26/2019    Procedure: Cardiac Catheterization/Vascular Study VEL OK  HE WILL WAIT 1 YEAR FOR SHOULDER SURGERY ;  Surgeon: Wade Ramey MD;  Location:  [facility];  Service: Cardiology    CARDIAC CATHETERIZATION Left 04/30/2021    Procedure: Coronary angiography;  Surgeon: Sahil Llamas MD;  Location:  [facility];  Service: Cardiology;  Laterality: Left;    CARDIAC CATHETERIZATION N/A 04/30/2021    Procedure: Percutaneous Coronary Intervention;  Surgeon: Sahil Llamas MD;  Location:   [facility];  Service: Cardiology;  Laterality: N/A;    CARDIAC CATHETERIZATION N/A 11/09/2022    Procedure: Left Heart Cath with SVGs;  Surgeon: Wade Ramey MD;  Location:  [facility];  Service: Cardiology;  Laterality: N/A;    CARPAL TUNNEL RELEASE      January 2024 and pther hand February 2024    CHOLECYSTECTOMY      CHOLECYSTECTOMY WITH INTRAOPERATIVE CHOLANGIOGRAM N/A 08/01/2018    Procedure: CHOLECYSTECTOMY LAPAROSCOPIC INTRAOPERATIVE CHOLANGIOGRAM;  Surgeon: Shane Ann MD;  Location:  [facility] OR;  Service: General    COLONOSCOPY N/A 07/14/2020    Procedure: COLONOSCOPY WITH ANESTHESIA;  Surgeon: Anupam Morales DO;  Location: [facility] ENDOSCOPY;  Service: Gastroenterology;  Laterality: N/A;  pre: abdominal pain  post: diverticulosis  Vinayak Correia PA    CORONARY ANGIOPLASTY      CORONARY ARTERY BYPASS GRAFT N/A 07/06/2019    Procedure: CABG X2 WITH LIMA, LEFT LEG OVH, AND PLACEMENT OF LEFT FEMORAL ARTERIAL LINE;  Surgeon: Steven Tang MD;  Location: [facility] OR;  Service: Cardiothoracic    CORONARY STENT PLACEMENT      x 6    CYSTOSCOPY TRANSURETHRAL RESECTION OF PROSTATE N/A 01/23/2023    Procedure: CYSTOSCOPY TRANSURETHRAL RESECTION OF PROSTATE;  Surgeon: Latrell Pope MD;  Location: [facility] OR;  Service: Urology;  Laterality: N/A;    ENDOSCOPIC FUNCTIONAL SINUS SURGERY (FESS) Bilateral 12/13/2017    Procedure: PROCEDURE PERFORMED:  Bilateral functional endoscopic anterior ethmoidectomy with bilateral middle meatal antrostomy Septoplasty Right kathia bullosa resection Bilateral inferior turbinate reduction via Coblation;  Surgeon: Mayank Ibarra MD;  Location: [facility] OR;  Service:     ENDOSCOPY N/A 07/30/2018    Procedure: ESOPHAGOGASTRODUODENOSCOPY WITH ANESTHESIA;  Surgeon: Benitez Mas MD;  Location: [facility] ENDOSCOPY;  Service: Gastroenterology    ENDOSCOPY N/A 07/14/2020    Procedure: ESOPHAGOGASTRODUODENOSCOPY WITH ANESTHESIA;  Surgeon:  Anupam Morales, DO;  Location:  [facility];  Service: Gastroenterology;  Laterality: N/A;  pre: abdominal pain  post: esophagitis  Vinayak Correia, PA    HERNIA REPAIR      x2 inguinal area    KIDNEY STONE SURGERY      KNEE ARTHROSCOPY Right 2022    Procedure: RIGHT KNEE PARTIAL LATERAL MENISCECTOMY;  Surgeon: Pedro Pablo Song MD;  Location:  [facility] OR;  Service: Orthopedics;  Laterality: Right;    KNEE SURGERY Right     OTHER SURGICAL HISTORY      urolift    PROSTATE SURGERY      Dr. Badillo -     PULMONARY ARTERY PRESSURE SENSOR IMPLANT N/A 2023    Procedure: PA Pressure Sensor Implant;  Surgeon: Wade Ramey MD;  Location:  [facility];  Service: Cardiology;  Laterality: N/A;    ROTATOR CUFF REPAIR Right     SLEEP ENDOSCOPY N/A 2023    Procedure: Videosleep endoscopy;  Surgeon: Mayank Ibarra MD;  Location:  [facility] OR;  Service: ENT;  Laterality: N/A;    THUMB AMPUTATION Left     partial    TOE SURGERY      great toe       Social History     Socioeconomic History    Marital status:    Tobacco Use    Smoking status: Former     Current packs/day: 0.00     Average packs/day: 1.5 packs/day for 18.0 years (27.0 ttl pk-yrs)     Types: Cigarettes, Cigars     Start date:      Quit date:      Years since quittin.4     Passive exposure: Past    Smokeless tobacco: Former     Types: Chew     Quit date:    Vaping Use    Vaping status: Never Used   Substance and Sexual Activity    Alcohol use: No     Comment: 0    Drug use: No    Sexual activity: Defer       Family History   Problem Relation Age of Onset    Heart disease Father     COPD Mother     Hypertension Mother     Asthma Mother     No Known Problems Sister     Colon cancer Paternal Uncle     Prostate cancer Maternal Grandfather     No Known Problems Sister     Colon cancer Maternal Grandmother     Colon polyps Maternal Grandmother        Objective    Temp 98.6 °F (37 °C)   Ht 182.9 cm  "(72\")   Wt 117 kg (259 lb)   BMI 35.13 kg/m²     Physical Exam  Vitals reviewed.   Constitutional:       Appearance: Normal appearance.   HENT:      Head: Normocephalic and atraumatic.   Pulmonary:      Effort: Pulmonary effort is normal.   Skin:     Coloration: Skin is not pale.   Neurological:      Mental Status: He is alert.   Psychiatric:         Mood and Affect: Mood normal.         Behavior: Behavior normal.             Results for orders placed or performed in visit on 06/06/24   POC Urinalysis Dipstick, Multipro    Specimen: Urine   Result Value Ref Range    Color Yellow Yellow, Straw, Dark Yellow, [place]    Clarity, UA Clear Clear    Glucose, UA >=1000 mg/dL (3+) (A) Negative mg/dL    Bilirubin Negative Negative    Ketones, UA Negative Negative    Specific Gravity  1.020 1.005 - 1.030    Blood, UA Negative Negative    pH, Urine 5.5 5.0 - 8.0    Protein, POC Negative Negative mg/dL    Urobilinogen, UA 0.2 E.U./dL Normal, 0.2 E.U./dL    Nitrite, UA Negative Negative    Leukocytes Negative Negative     IPSS Questionnaire (AUA-7):  Incomplete emptying  Over the past month, how often have you had a sensation of not emptying your bladder completely after you finished urinating?: About half the time (06/06/24 0927)  Frequency  Over the past month, how often have you had to urinate again less than two hours after you finishied urinating ?: Less than half the time (06/06/24 0927)  Intermittency  Over the past month, how often have you found you stopped and started again several time when you urinated ?: Less than 1 time in 5 (06/06/24 0927)  Urgency  Over the last month, how often have you found it difficult  have you found it difficult to postpone urination ?: More than half the time (06/06/24 0927)  Weak Stream  Over the past month, how often have you had a weak urinary stream ?: About half the time (06/06/24 0927)  Straining  Over the past month, how often have you had to push or strain to begin urination ?: Not " at all (06/06/24 0927)  Nocturia  Over the past month, how many times did you most typically get up to urinate from the time you went to bed until the time you got up in the morning ?: 4 times (06/06/24 0927)  Quality of life due to urinary symptoms  If you were to spend the rest of your life with your urinary condition the way it is now, how would feel about that?: Unhappy (06/06/24 0927)    Scores  Total IPSS Score: (!) 17 (06/06/24 0927)  Total Score = Symptomatic Level: Moderately symptomatic: 8-19 (06/06/24 0927)     Estimation of residual urine via abdominal ultrasound  Residual Urine: 0 ml  Indication: BPH  Position: Supine  Examination: Incremental scanning of the suprapubic area using 3 MHz transducer using copious amounts of acoustic gel.   Findings: An anechoic area was demonstrated which represented the bladder, with measurement of residual urine as noted. I inspected this myself. In that the residual urine was stable or insignificant, no treatment will be necessary at this time.       Assessment and Plan    Diagnoses and all orders for this visit:    1. BPH with obstruction/lower urinary tract symptoms (Primary)  -     POC Urinalysis Dipstick, Multipro    Continue tamsulosin as directed however I am going to give him another 6 weeks of Gemtesa samples because they were effective for several weeks and then the last week or 2 they were not as effective.  Will reassess when he returns in 6 weeks.  PTNS treatment is also better option.

## 2024-05-21 ENCOUNTER — APPOINTMENT (OUTPATIENT)
Dept: CARDIOLOGY | Facility: HOSPITAL | Age: 64
End: 2024-05-21
Payer: OTHER GOVERNMENT

## 2024-05-21 ENCOUNTER — READMISSION MANAGEMENT (OUTPATIENT)
Dept: CALL CENTER | Facility: HOSPITAL | Age: 64
End: 2024-05-21
Payer: MEDICARE

## 2024-05-21 VITALS
WEIGHT: 257.94 LBS | HEART RATE: 54 BPM | HEIGHT: 72 IN | TEMPERATURE: 98.3 F | OXYGEN SATURATION: 94 % | SYSTOLIC BLOOD PRESSURE: 130 MMHG | BODY MASS INDEX: 34.94 KG/M2 | DIASTOLIC BLOOD PRESSURE: 78 MMHG | RESPIRATION RATE: 18 BRPM

## 2024-05-21 LAB
ANION GAP SERPL CALCULATED.3IONS-SCNC: 10 MMOL/L (ref 5–15)
BH CV STRESS BP STAGE 1: NORMAL
BH CV STRESS COMMENTS STAGE 1: NORMAL
BH CV STRESS DOSE REGADENOSON STAGE 1: 0.4
BH CV STRESS DURATION MIN STAGE 1: 0
BH CV STRESS DURATION SEC STAGE 1: 10
BH CV STRESS HR STAGE 1: 70
BH CV STRESS PROTOCOL 1: NORMAL
BH CV STRESS RECOVERY BP: NORMAL MMHG
BH CV STRESS RECOVERY HR: 65 BPM
BH CV STRESS STAGE 1: 1
BUN SERPL-MCNC: 24 MG/DL (ref 8–23)
BUN/CREAT SERPL: 21.2 (ref 7–25)
CALCIUM SPEC-SCNC: 9.2 MG/DL (ref 8.6–10.5)
CHLORIDE SERPL-SCNC: 109 MMOL/L (ref 98–107)
CHOLEST SERPL-MCNC: 97 MG/DL (ref 0–200)
CO2 SERPL-SCNC: 22 MMOL/L (ref 22–29)
CREAT SERPL-MCNC: 1.13 MG/DL (ref 0.76–1.27)
EGFRCR SERPLBLD CKD-EPI 2021: 71.7 ML/MIN/1.73
GLUCOSE BLDC GLUCOMTR-MCNC: 118 MG/DL (ref 70–130)
GLUCOSE SERPL-MCNC: 186 MG/DL (ref 65–99)
HBA1C MFR BLD: 6.8 % (ref 4.8–5.6)
HDLC SERPL-MCNC: 26 MG/DL (ref 40–60)
LDLC SERPL CALC-MCNC: 37 MG/DL (ref 0–100)
LDLC/HDLC SERPL: 1.08 {RATIO}
MAXIMAL PREDICTED HEART RATE: 154 BPM
PERCENT MAX PREDICTED HR: 45.45 %
POTASSIUM SERPL-SCNC: 4.3 MMOL/L (ref 3.5–5.2)
QT INTERVAL: 414 MS
QTC INTERVAL: 434 MS
SODIUM SERPL-SCNC: 141 MMOL/L (ref 136–145)
STRESS BASELINE BP: NORMAL MMHG
STRESS BASELINE HR: 54 BPM
STRESS PERCENT HR: 53 %
STRESS POST EXERCISE DUR MIN: 0 MIN
STRESS POST EXERCISE DUR SEC: 10 SEC
STRESS POST PEAK BP: NORMAL MMHG
STRESS POST PEAK HR: 70 BPM
STRESS TARGET HR: 131 BPM
TRIGL SERPL-MCNC: 214 MG/DL (ref 0–150)
TSH SERPL DL<=0.05 MIU/L-ACNC: 1.67 UIU/ML (ref 0.27–4.2)
VLDLC SERPL-MCNC: 34 MG/DL (ref 5–40)

## 2024-05-21 PROCEDURE — G0378 HOSPITAL OBSERVATION PER HR: HCPCS

## 2024-05-21 PROCEDURE — 96374 THER/PROPH/DIAG INJ IV PUSH: CPT

## 2024-05-21 PROCEDURE — 80061 LIPID PANEL: CPT | Performed by: NURSE PRACTITIONER

## 2024-05-21 PROCEDURE — 78452 HT MUSCLE IMAGE SPECT MULT: CPT

## 2024-05-21 PROCEDURE — 78452 HT MUSCLE IMAGE SPECT MULT: CPT | Performed by: EMERGENCY MEDICINE

## 2024-05-21 PROCEDURE — 83036 HEMOGLOBIN GLYCOSYLATED A1C: CPT | Performed by: NURSE PRACTITIONER

## 2024-05-21 PROCEDURE — 0 TECHNETIUM TETROFOSMIN KIT: Performed by: FAMILY MEDICINE

## 2024-05-21 PROCEDURE — 93017 CV STRESS TEST TRACING ONLY: CPT

## 2024-05-21 PROCEDURE — 84443 ASSAY THYROID STIM HORMONE: CPT | Performed by: NURSE PRACTITIONER

## 2024-05-21 PROCEDURE — 63710000001 INSULIN GLARGINE PER 5 UNITS: Performed by: NURSE PRACTITIONER

## 2024-05-21 PROCEDURE — 93018 CV STRESS TEST I&R ONLY: CPT | Performed by: EMERGENCY MEDICINE

## 2024-05-21 PROCEDURE — A9502 TC99M TETROFOSMIN: HCPCS | Performed by: FAMILY MEDICINE

## 2024-05-21 PROCEDURE — 82948 REAGENT STRIP/BLOOD GLUCOSE: CPT

## 2024-05-21 PROCEDURE — 80048 BASIC METABOLIC PNL TOTAL CA: CPT | Performed by: NURSE PRACTITIONER

## 2024-05-21 PROCEDURE — 25010000002 REGADENOSON 0.4 MG/5ML SOLUTION: Performed by: EMERGENCY MEDICINE

## 2024-05-21 RX ORDER — SULFAMETHOXAZOLE AND TRIMETHOPRIM 400; 80 MG/1; MG/1
1 TABLET ORAL 2 TIMES DAILY
COMMUNITY
Start: 2024-05-17 | End: 2024-05-27

## 2024-05-21 RX ORDER — RIMEGEPANT SULFATE 75 MG/75MG
1 TABLET, ORALLY DISINTEGRATING ORAL 3 TIMES DAILY PRN
COMMUNITY

## 2024-05-21 RX ORDER — REGADENOSON 0.08 MG/ML
0.4 INJECTION, SOLUTION INTRAVENOUS ONCE
Status: COMPLETED | OUTPATIENT
Start: 2024-05-21 | End: 2024-05-21

## 2024-05-21 RX ADMIN — SACUBITRIL AND VALSARTAN 2 TABLET: 49; 51 TABLET, FILM COATED ORAL at 12:35

## 2024-05-21 RX ADMIN — METOPROLOL TARTRATE 100 MG: 100 TABLET, FILM COATED ORAL at 00:05

## 2024-05-21 RX ADMIN — LAMOTRIGINE 200 MG: 100 TABLET ORAL at 12:35

## 2024-05-21 RX ADMIN — METOPROLOL TARTRATE 100 MG: 100 TABLET, FILM COATED ORAL at 12:35

## 2024-05-21 RX ADMIN — LEVETIRACETAM 2000 MG: 500 TABLET, FILM COATED ORAL at 00:06

## 2024-05-21 RX ADMIN — ASPIRIN 81 MG: 81 TABLET, CHEWABLE ORAL at 12:36

## 2024-05-21 RX ADMIN — APIXABAN 5 MG: 5 TABLET, FILM COATED ORAL at 00:04

## 2024-05-21 RX ADMIN — SPIRONOLACTONE 50 MG: 50 TABLET ORAL at 12:34

## 2024-05-21 RX ADMIN — Medication 10 ML: at 00:14

## 2024-05-21 RX ADMIN — LEVETIRACETAM 1500 MG: 500 TABLET, FILM COATED ORAL at 12:34

## 2024-05-21 RX ADMIN — GABAPENTIN 300 MG: 300 CAPSULE ORAL at 00:04

## 2024-05-21 RX ADMIN — TETROFOSMIN 1 DOSE: 1.38 INJECTION, POWDER, LYOPHILIZED, FOR SOLUTION INTRAVENOUS at 08:28

## 2024-05-21 RX ADMIN — TAMSULOSIN HYDROCHLORIDE 0.4 MG: 0.4 CAPSULE ORAL at 12:34

## 2024-05-21 RX ADMIN — ROSUVASTATIN CALCIUM 20 MG: 20 TABLET, FILM COATED ORAL at 00:05

## 2024-05-21 RX ADMIN — ISOSORBIDE MONONITRATE 120 MG: 60 TABLET, EXTENDED RELEASE ORAL at 12:35

## 2024-05-21 RX ADMIN — REGADENOSON 0.4 MG: 0.08 INJECTION, SOLUTION INTRAVENOUS at 09:39

## 2024-05-21 RX ADMIN — INSULIN GLARGINE 50 UNITS: 100 INJECTION, SOLUTION SUBCUTANEOUS at 00:05

## 2024-05-21 RX ADMIN — SACUBITRIL AND VALSARTAN 2 TABLET: 49; 51 TABLET, FILM COATED ORAL at 00:14

## 2024-05-21 RX ADMIN — LAMOTRIGINE 200 MG: 100 TABLET ORAL at 00:06

## 2024-05-21 RX ADMIN — TETROFOSMIN 1 DOSE: 1.38 INJECTION, POWDER, LYOPHILIZED, FOR SOLUTION INTRAVENOUS at 09:40

## 2024-05-21 RX ADMIN — Medication 10 ML: at 09:15

## 2024-05-21 RX ADMIN — APIXABAN 5 MG: 5 TABLET, FILM COATED ORAL at 12:36

## 2024-05-21 NOTE — PLAN OF CARE
Goal Outcome Evaluation:       Problem: Chest Pain  Goal: Resolution of Chest Pain Symptoms  5/21/2024 0114 by Carolina Baum RN  Outcome: Ongoing, Progressing  5/21/2024 0034 by Carolina Baum RN  Outcome: Ongoing, Progressing  Intervention: Manage Acute Chest Pain  Flowsheets (Taken 5/21/2024 0034)  Chest Pain Intervention:   cardiac biomarkers drawn   cardiac monitoring continued   12-lead ECG obtained   activity minimized  Problem: Pain Acute  Goal: Acceptable Pain Control and Functional Ability  Outcome: Ongoing, Progressing  Intervention: Prevent or Manage Pain  Flowsheets (Taken 5/21/2024 0034)  Sensory Stimulation Regulation:   auditory stimulation minimized   lighting decreased   quiet environment promoted  Bowel Elimination Promotion:   ambulation promoted   privacy promoted  Medication Review/Management:   medications reviewed   dosing adjusted   pharmacy consulted   provider consulted  Intervention: Develop Pain Management Plan  Flowsheets (Taken 5/21/2024 0034)  Pain Management Interventions: see MAR  Intervention: Optimize Psychosocial Wellbeing  Flowsheets (Taken 5/21/2024 0034)  Supportive Measures:   active listening utilized   decision-making supported   self-care encouraged  Diversional Activities: smartphone  Spiritual Activities Assistance:   affirmation provided   hope instilled     Problem: Impaired Wound Healing  Goal: Optimal Wound Healing  Outcome: Ongoing, Progressing  Variance Department closed  Impact: Moderate  Comment: In-patient wound care consult for AM  Intervention: Promote Wound Healing  Flowsheets  Taken 5/21/2024 0114  Activity Management:   ambulated outside room   ambulated to bathroom   back to bed   sitting, edge of bed    Taken 5/21/2024 0034  Pain Management Interventions: see MAR      Patient very educated about chest pain interventions, including stress tests and stenting, as well as management of acute pain. Patient verbalized to this nurse a need to decrease  activities, alert this nurse to increased pain, or change in symptoms. Patient participated in teachings. Accepted well. Able to teach back. Patient spoke to this nurse about wound care of right great toe. States this has been an ongoing issue x 5 years. [facility], and eager to participate in care. Wound well-appearing, edges shows sign of healing. Patient verbalized acceptance of appointment inpatient. IV patent, coband applied. Call button at bedside.

## 2024-05-21 NOTE — H&P
[facility] Medicine Services  HISTORY AND PHYSICAL    Date of Admission: 5/20/2024  Primary Care Physician: Vinayak Correia PA    Subjective   Primary Historian: Patient and wife    Chief Complaint: Chest pain    History of Present Illness  Carlos A Boyer  is a 64-year-old male with a past medical history of coronary artery disease with stents, MI, insulin-dependent diabetes-poorly controlled, bilateral carotid artery disease, nonhealing diabetic wound right foot followed by [facility] wound care, paroxysmal atrial fibrillation on Eliquis, please see below for complete list.  [facility] when after walking and becoming hot he developed left-sided chest pain that radiated into the neck.  He did not report chest pain to the providers at that facility.  He states once he cooled off and rested it went away.  He did not take his nitroglycerin because he forgot about them.  He states after he walked out into the parking lot and drove home he seemed to be doing okay until he arrived at home and again while walking in the heat he developed same type of pain.  He has no associated shortness of breathing or nausea.  He states he did have nausea after arrival to the ED, not associated with the chest pain.  He has had none since arrival.  Troponins are negative.  Patient has a specialty shoe right foot, wife states she changes dressing daily.  We will consult wound nurse.  Patient will not be able to ambulate for stress test.  He has no other complaints.  He is admitted for further evaluation and treatment.      Review of Systems   Otherwise complete ROS reviewed and negative except as mentioned in the HPI.    Past Medical History:   Past Medical History:   Diagnosis Date    Arthritis     Asthma     Cancer     skin    Carotid disease, bilateral     Cellulitis     right foot - on antibiotics 6-    Chest pain     Chronic diastolic congestive heart  failure 09/07/2019    Chronic sinusitis     Maribell bullosa     Coronary artery disease involving native coronary artery of native heart with unstable angina pectoris 01/17/2017    Deviated septum     Diabetes mellitus     Difficulty urinating     Diverticulitis     Enlarged prostate     Fatty liver     GERD (gastroesophageal reflux disease)     [place] (hard of hearing)     Does have hearing aids    Hyperlipidemia LDL goal <70 02/02/2017    Hypertrophy of nasal turbinates     Keratoderma     Kidney stone     Lung nodules     lower right lung    Migraine     Murmur, heart     Myocardial infarction     Obesity     Paroxysmal atrial fibrillation 07/11/2019    Personal history of COVID-19 07/2021    PONV (postoperative nausea and vomiting)     Primary hypertension 10/16/2016    Psoriasis     Seizures     Sinus congestion     Skin cancer     Sleep apnea     not using cpap    SOB (shortness of breath)     Stroke     mild weakness on right side    UTI (urinary tract infection)      Past Surgical History:  Past Surgical History:   Procedure Laterality Date    CARDIAC CATHETERIZATION  01/2016    Dr. Broadbent; widely patent previously placed stents in the left anterior descending and obstructive disease involving the diagonal branch which was treated medically    CARDIAC CATHETERIZATION N/A 07/14/2017    Procedure: Left Heart Cath;  Surgeon: Wade Ramey MD;  Location:  [facility] CATH [facility]VASIVE [facility];  Service:     CARDIAC CATHETERIZATION Left 10/15/2018    Procedure: Cardiac Catheterization/Vascular Study;  Surgeon: Wade Ramey MD;  Location:  [facility] CATH [facility];  Service: Cardiology    CARDIAC CATHETERIZATION  10/15/2018    Procedure: Functional Flow [place];  Surgeon: Wade Ramey MD;  Location:  [facility] CATH [facility];  Service: Cardiology    CARDIAC CATHETERIZATION N/A 10/15/2018    Procedure: Left ventriculography;  Surgeon: Wade Ramey MD;  Location:  [facility] CATH [facility];  Service: Cardiology     CARDIAC CATHETERIZATION Left 06/26/2019    Procedure: Cardiac Catheterization/Vascular Study VEL OK  HE WILL WAIT 1 YEAR FOR SHOULDER SURGERY ;  Surgeon: Wade Ramey MD;  Location:  [facility] CATH [facility];  Service: Cardiology    CARDIAC CATHETERIZATION Left 04/30/2021    Procedure: Coronary angiography;  Surgeon: Sahil Llamas MD;  Location:  [facility] CATH [facility];  Service: Cardiology;  Laterality: Left;    CARDIAC CATHETERIZATION N/A 04/30/2021    Procedure: Percutaneous Coronary Intervention;  Surgeon: Sahil Llamas MD;  Location:  [facility];  Service: Cardiology;  Laterality: N/A;    CARDIAC CATHETERIZATION N/A 11/09/2022    Procedure: Left Heart Cath with SVGs;  Surgeon: Wade Ramey MD;  Location:  [facility]ATH [facility];  Service: Cardiology;  Laterality: N/A;    CARPAL TUNNEL RELEASE      January 2024 and pther hand February 2024    CHOLECYSTECTOMY      CHOLECYSTECTOMY WITH INTRAOPERATIVE CHOLANGIOGRAM N/A 08/01/2018    Procedure: CHOLECYSTECTOMY LAPAROSCOPIC INTRAOPERATIVE CHOLANGIOGRAM;  Surgeon: Shane Ann MD;  Location: [facility] OR;  Service: General    COLONOSCOPY N/A 07/14/2020    Procedure: COLONOSCOPY WITH ANESTHESIA;  Surgeon: Anupam Morales DO;  Location: [facility] ENDOSCOPY;  Service: Gastroenterology;  Laterality: N/A;  pre: abdominal pain  post: diverticulosis  Vinayak Correia PA    CORONARY ANGIOPLASTY      CORONARY ARTERY BYPASS GRAFT N/A 07/06/2019    Procedure: CABG X2 WITH LIMA, LEFT LEG OVH, AND PLACEMENT OF LEFT FEMORAL ARTERIAL LINE;  Surgeon: Steven Tang MD;  Location: [facility] OR;  Service: Cardiothoracic    CORONARY STENT PLACEMENT      x 6    CYSTOSCOPY TRANSURETHRAL RESECTION OF PROSTATE N/A 01/23/2023    Procedure: CYSTOSCOPY TRANSURETHRAL RESECTION OF PROSTATE;  Surgeon: Latrell Pope MD;  Location:  [facility] OR;  Service: Urology;  Laterality: N/A;    ENDOSCOPIC FUNCTIONAL SINUS SURGERY (FESS) Bilateral  12/13/2017    Procedure: PROCEDURE PERFORMED:  Bilateral functional endoscopic anterior ethmoidectomy with bilateral middle meatal antrostomy Septoplasty Right kathia bullosa resection Bilateral inferior turbinate reduction via Cob[place];  Surgeon: Mayank Ibarra MD;  Location:  [facility] OR;  Service:     ENDOSCOPY N/A 07/30/2018    Procedure: ESOPHAGOGASTRODUODENOSCOPY WITH ANESTHESIA;  Surgeon: Benitez Mas MD;  Location:  [facility] ENDOSCOPY;  Service: Gastroenterology    ENDOSCOPY N/A 07/14/2020    Procedure: ESOPHAGOGASTRODUODENOSCOPY WITH ANESTHESIA;  Surgeon: Anupam Morales DO;  Location: [facility] ENDOSCOPY;  Service: Gastroenterology;  Laterality: N/A;  pre: abdominal pain  post: esophagitis  Vinayak Correia, PA    HERNIA REPAIR      x2 inguinal area    KIDNEY STONE SURGERY      KNEE ARTHROSCOPY Right 03/01/2022    Procedure: RIGHT KNEE PARTIAL LATERAL MENISCECTOMY;  Surgeon: Pedro Pablo Song MD;  Location: [facility] OR;  Service: Orthopedics;  Laterality: Right;    KNEE SURGERY Right     OTHER SURGICAL HISTORY      urolift    PROSTATE SURGERY      Dr. Badillo - 2017    PULMONARY ARTERY PRESSURE SENSOR IMPLANT N/A 05/08/2023    Procedure: PA Pressure Sensor Implant;  Surgeon: Wade Ramey MD;  Location: [facility] CATH INVASIVE LOCATION;  Service: Cardiology;  Laterality: N/A;    ROTATOR CUFF REPAIR Right     SLEEP ENDOSCOPY N/A 02/27/2023    Procedure: Videosleep endoscopy;  Surgeon: Mayank Ibarra MD;  Location: [facility] OR;  Service: ENT;  Laterality: N/A;    THUMB AMPUTATION Left     partial    TOE SURGERY      great toe     Social History:  reports that he quit smoking about 31 years ago. His smoking use included cigarettes and cigars. He started smoking about 49 years ago. He has a 27 pack-year smoking history. He has been exposed to tobacco smoke. He quit smokeless tobacco use about 15 years ago.  His smokeless tobacco use included chew. He reports that he does not drink alcohol and does  not use drugs.    Family History: family history includes Asthma in his mother; COPD in his mother; Colon cancer in his maternal grandmother and paternal uncle; Colon polyps in his maternal grandmother; Heart disease in his father; Hypertension in his mother; No Known Problems in his sister and sister; Prostate cancer in his maternal grandfather.       Allergies:  Allergies   Allergen Reactions    Flagyl [Metronidazole] Hives    Atorvastatin Other (See Comments) and Myalgia      - LIPITOR -   Muscle cramps    Ciprofloxacin Hives      - CIPRO -        Medications:  No current facility-administered medications on file prior to encounter.     Current Outpatient Medications on File Prior to Encounter   Medication Sig Dispense Refill    acetaminophen (TYLENOL) 325 MG tablet Take 1 tablet by mouth Every 6 (Six) Hours As Needed for Mild Pain.      albuterol (PROVENTIL HFA;VENTOLIN HFA) 108 (90 BASE) MCG/ACT inhaler Inhale 2 puffs Every 6 (Six) Hours As Needed for Wheezing.      apixaban (ELIQUIS) 5 MG tablet tablet Take 1 tablet by mouth 2 (Two) Times a Day.      Aspirin 81 MG capsule Take 81 mg by mouth Daily. Dr. Ramey or Dr. Tang      calcium polycarbophil (FIBERCON) 625 MG tablet Take 1 tablet by mouth Daily As Needed.      carboxymethylcellulose (REFRESH PLUS) 0.5 % solution Administer 1 drop to both eyes 4 (Four) Times a Day As Needed for Dry Eyes.      empagliflozin (JARDIANCE) 10 MG tablet tablet Take 1 tablet by mouth Daily.      ezetimibe (ZETIA) 10 MG tablet Take 1 tablet by mouth Daily.      fluticasone (FLONASE) 50 MCG/ACT nasal spray 2 sprays into the nostril(s) as directed by provider Daily As Needed for Rhinitis or Allergies.      furosemide (Lasix) 40 MG tablet Take 1 tablet by mouth Daily As Needed (take as needed for weight gain more than 2 pounds in a day or more than than 3 pounds in 2 days.). 90 tablet 1    gabapentin (NEURONTIN) 300 MG capsule Take 1 capsule by mouth Every Night.      guaiFENesin  (MUCINEX) 600 MG 12 hr tablet Take 2 tablets by mouth Daily As Needed for Congestion.      insulin aspart (novoLOG FLEXPEN) 100 UNIT/ML solution pen-injector sc pen Inject 50 Units under the skin into the appropriate area as directed 3 (Three) Times a Day With Meals. Can use 5 to 6 more units if needed in early evening.      INSULIN GLARGINE-YFGN SC Inject 100 Units under the skin into the appropriate area as directed Every Night.      INSULIN GLARGINE-YFGN SC Inject 80 Units under the skin into the appropriate area as directed Daily.      isosorbide mononitrate (IMDUR) 120 MG 24 hr tablet Take 1 tablet by mouth Daily. 90 tablet 3    lamoTRIgine (LaMICtal) 200 MG tablet Take 1 tablet by mouth 2 (Two) Times a Day. 180 tablet 1    levETIRAcetam (KEPPRA) 500 MG tablet Take 3 tablets by mouth Every Morning. 90 tablet 5    levETIRAcetam (KEPPRA) 500 MG tablet Take 4 tablets by mouth Every Night. 120 tablet 5    metFORMIN (GLUCOPHAGE) 1000 MG tablet Take 1 tablet by mouth 2 (Two) Times a Day With Meals.      metoprolol tartrate (LOPRESSOR) 100 MG tablet Take 1 tablet by mouth 2 (Two) Times a Day. Told to take the DOS-2/27/23, w/ sip of water.      Multiple Vitamin (MULTI VITAMIN PO) Take 1 tablet by mouth Daily.      nitroglycerin (NITROSTAT) 0.4 MG SL tablet Place 1 tablet under the tongue Every 5 (Five) Minutes As Needed for Chest Pain. Take no more than 3 doses in 15 minutes. 25 tablet 2    pantoprazole (PROTONIX) 40 MG EC tablet Take 1 tablet by mouth 2 (Two) Times a Day.      polyethylene glycol (MIRALAX) 17 GM/SCOOP powder Take two scoops twice a day until bowel movements normalize 578 g 0    psyllium (METAMUCIL) 58.6 % packet Take 1 packet by mouth Daily As Needed (constipation).      Rimegepant Sulfate (Nurtec) 75 MG tablet dispersible tablet Take 1 tablet by mouth As Needed (Migraine). 8 tablet 5    rosuvastatin (CRESTOR) 20 MG tablet Take 1 tablet by mouth Every Night.      sacubitril-valsartan (Entresto)   "MG tablet Take 1 tablet by mouth 2 (Two) Times a Day.      Semaglutide,0.25 or 0.5MG/DOS, (Ozempic, 0.25 or 0.5 MG/DOSE,) 2 MG/1.5ML solution pen-injector Inject  under the skin into the appropriate area as directed 1 (One) Time Per Week.      spironolactone (ALDACTONE) 50 MG tablet Take 1 tablet by mouth Daily.      tamsulosin (FLOMAX) 0.4 MG capsule 24 hr capsule Take 1 capsule by mouth Daily.        I have utilized all available immediate resources to obtain, update, or review the patient's current medications (including all prescriptions, over-the-counter products, herbals, cannabis/cannabidiol products, and vitamin/mineral/dietary (nutritional) supplements).    Objective     Vital Signs: /83   Pulse 60   Temp 98.1 °F (36.7 °C) (Oral)   Resp 18   Ht 182.9 cm (72\")   Wt 116 kg (255 lb 11.7 oz)   SpO2 93%   BMI 34.68 kg/m²   Physical Exam  Vitals reviewed.   Constitutional:       Appearance: Normal appearance.   HENT:      Head: Normocephalic and atraumatic.      Mouth/Throat:      Mouth: Mucous membranes are moist.      Pharynx: Oropharynx is clear.      Comments: Poor dentition  Eyes:      Extraocular Movements: Extraocular movements intact.      Conjunctiva/sclera: Conjunctivae normal.   Cardiovascular:      Rate and Rhythm: Normal rate and regular rhythm.   Pulmonary:      Effort: Pulmonary effort is normal.      Breath sounds: Normal breath sounds.   Abdominal:      General: Abdomen is protuberant.      Palpations: Abdomen is soft.   Musculoskeletal:      Cervical back: Normal range of motion and neck supple.      Right lower leg: No edema.      Left lower leg: No edema.      Comments: Generalized weakness and debility   Skin:     General: Skin is warm and dry.      Comments: Nonhealing wound right foot   Neurological:      General: No focal deficit present.      Mental Status: He is alert and oriented to person, place, and time.   Psychiatric:         Mood and Affect: Mood normal.         " Behavior: Behavior normal.        Results Reviewed:  Lab Results (last 24 hours)       Procedure Component Value Units Date/Time    Respiratory Panel PCR w/COVID-19(SARS-CoV-2) [facility] In-House, NP Swab in UTM/VTM, 2 HR TAT - Swab, Nasopharynx [[id number]]  (Normal) Collected: 05/20/24 2006    Specimen: Swab from Nasopharynx Updated: 05/20/24 2105     ADENOVIRUS, PCR Not Detected     Coronavirus 229E Not Detected     Coronavirus HKU1 Not Detected     Coronavirus NL63 Not Detected     Coronavirus OC43 Not Detected     COVID19 Not Detected     Human Metapneumovirus Not Detected     Human Rhinovirus/Enterovirus Not Detected     Influenza A PCR Not Detected     Influenza B PCR Not Detected     Parainfluenza Virus 1 Not Detected     Parainfluenza Virus 2 Not Detected     Parainfluenza Virus 3 Not Detected     Parainfluenza Virus 4 Not Detected     RSV, PCR Not Detected     Bordetella pertussis pcr Not Detected     Bordetella parapertussis PCR Not Detected     Chlamydophila pneumoniae PCR Not Detected     Mycoplasma pneumo by PCR Not Detected    BNP [[id number]]  (Normal) Collected: 05/20/24 2005    Specimen: Blood Updated: 05/20/24 2055     proBNP 82.8 pg/mL     D-dimer, Quantitative [[id number]]  (Normal) Collected: 05/20/24 2005    Specimen: Blood Updated: 05/20/24 2051     D-Dimer, Quantitative 0.35 MCGFEU/mL     CBC Auto Differential [[id number]]  (Abnormal) Collected: 05/20/24 2005    Specimen: Blood Updated: 05/20/24 2043     WBC 6.08 10*3/mm3      RBC 4.64 10*6/mm3      Hemoglobin 13.8 g/dL      Hematocrit 42.3 %      MCV 91.2 fL      MCH 29.7 pg      MCHC 32.6 g/dL      RDW 13.4 %      RDW-SD 44.6 fl      MPV 11.8 fL      Platelets 102 10*3/mm3      Neutrophil % 42.8 %      Lymphocyte % 40.6 %      Monocyte % 8.9 %      Eosinophil % 2.6 %      Basophil % 1.3 %      Immature Grans % 3.8 %      Neutrophils, Absolute 2.60 10*3/mm3      Lymphocytes, Absolute 2.47 10*3/mm3      Monocytes, Absolute 0.54  10*3/mm3      Eosinophils, Absolute 0.16 10*3/mm3      Basophils, Absolute 0.08 10*3/mm3      Immature Grans, Absolute 0.23 10*3/mm3     Comprehensive Metabolic Panel [[id number]]  (Abnormal) Collected: 05/20/24 2005    Specimen: Blood Updated: 05/20/24 2039     Glucose 119 mg/dL      BUN 22 mg/dL      Creatinine 1.29 mg/dL      Sodium 139 mmol/L      Potassium 4.5 mmol/L      Comment: Slight hemolysis detected by analyzer. Result may be falsely elevated.        Chloride 107 mmol/L      CO2 21.0 mmol/L      Calcium 9.6 mg/dL      Total Protein 7.1 g/dL      Albumin 4.2 g/dL      ALT (SGPT) 22 U/L      AST (SGOT) 21 U/L      Comment: Slight hemolysis detected by analyzer. Result may be falsely elevated.        Alkaline Phosphatase 74 U/L      Total Bilirubin 0.6 mg/dL      Globulin 2.9 gm/dL      A/G Ratio 1.4 g/dL      BUN/Creatinine Ratio 17.1     Anion Gap 11.0 mmol/L      eGFR 61.2 mL/min/1.73     High Sensitivity Troponin T [[id number]]  (Normal) Collected: 05/20/24 2005    Specimen: Blood Updated: 05/20/24 2034     HS Troponin T 12 ng/L           Imaging Results (Last 24 Hours)       Procedure Component Value Units Date/Time    XR Chest 1 View [[id number]] Collected: 05/20/24 2043     Updated: 05/20/24 2047    Narrative:      EXAMINATION:  XR CHEST 1 VW-  5/20/2024 7:24 PM     HISTORY: Chest pain triage protocol.     COMPARISON: 9/21/2023. 6/28/2023.     TECHNIQUE: Single view AP image.     FINDINGS: There is mild hypoventilation with vascular crowding. Coarse  markings and bronchial wall thickening appear stable. No dense  infiltrate is seen. No significant pleural effusion is seen. There is  scoliosis and degenerative change of the visualized spine.          Impression:      1. Mild hypoventilation with vascular crowding.  2. Coarse markings and bronchial wall thickening, stable. No new  infiltrate or effusion.           This report was signed and finalized on 5/20/2024 8:44 PM by Dr. Wojciech Davidson MD.              5/8/2023  Conclusion: Successful placement of pulmonary artery pressure sensor 5/8/2023. Successful pulmonary arteriogram of the left side     4/18/2023 ECHO  Interpretation Summary     Left ventricular systolic function is normal. Left ventricular ejection fraction appears to be 61 - 65%.    The following left ventricular wall segments are very mildly hypokinetic: apical inferior, apical septal and apex hypokinetic.    Left ventricular diastolic function is consistent with (grade II w/high LAP) pseudonormalization.    Left atrial volume is mildly increased.    Estimated right ventricular systolic pressure from tricuspid regurgitation is normal (<35 mmHg).    Normal size and function of the right ventricle.    No significant valvular pathology.    No significant change compared to prior exam from 4/29/21.        Assessment / Plan   Assessment:   Active Hospital Problems    Diagnosis     Chest pain     Presence of CardioMEMS HF system     Chronic anticoagulation     S/P CABG x 2     Chronic diastolic congestive heart failure     Paroxysmal atrial fibrillation     Class 1 obesity due to excess calories with serious comorbidity and body mass index (BMI) of 34.0 to 34.9 in adult     Coronary artery disease involving native coronary artery of native heart without angina pectoris     Type 2 diabetes mellitus with circulatory disorder, with long-term current use of insulin        Treatment Plan  1.  The patient will be admitted to Dr. Boone's service here at [facility].   2.  Lexiscan in a.m.  3.  N.p.o. after midnight, oral care  4.  Home medications reviewed and restarted as appropriate  5.  DVT prophylaxis with ongoing Eliquis use  6.  Supplemental oxygen, incentive spirometry, continuous pulse oximetry, daily weights  7.  Labs in a.m.  8.  Consult wound care-followed by [facility]    Medical Decision Making  Number and Complexity of problems: 9  Differential Diagnosis: None    Conditions and  Status        Condition is unchanged.     [facility] Data  External documents reviewed: No  Cardiac tracing (EKG, telemetry) interpretation: Reviewed  Radiology interpretation: Reviewed  Labs reviewed: Yes  Any tests that were considered but not ordered: No     Decision rules/scores evaluated (example [id number], [place], etc): No     Discussed with: Patient, wife and Dr. Jett    [facility] when after walking and becoming hot he developed left-sided chest pain that radiated into the neck. Case and plan discussed with NP and agree. Stress test ordered for in the am.      Care Planning  Shared decision making: Patient, wife and Dr. Jett  Code status and discussions: Full    Disposition  Social Determinants of Health that impact treatment or disposition: None  Estimated length of stay is 1-2 days.     I confirmed that the patient's advanced care plan is present, code status is documented, and a surrogate decision maker is listed in the patient's medical record.     The patient's surrogate decision maker is wife.     The patient was seen and examined by me on 5/20/2024 at 9:59 PM.    Electronically signed by TEENA Leung, 05/20/24, 23:49 CDT.

## 2024-05-21 NOTE — PAYOR COMM NOTE
"5/21/24 [facility] [phone]  [phone]      ER ADMIT ON 5/20/24 OBSERVATION ADMISSION.    NOTIFICATION NUMBER [id number]    [facility]        Carlos A Goldstein Jr. (64 y.o. Male)       Date of Birth   1960    Social Security Number       Address   [address] [place] [address]    Home Phone   [phone]    MRN   [id number]       [facility]    Marital Status                               Admission Date   5/20/24    Admission Type   Emergency    Admitting Provider   Willard Montana DO    Attending Provider   Willard Montana DO    Department, Room/Bed   [facility] EMERGENCY DEPARTMENT, 40/40       Discharge Date       Discharge Disposition       Discharge Destination                                 Attending Provider: Willard Montana DO    Allergies: Flagyl [Metronidazole], Atorvastatin, Ciprofloxacin    Isolation: None   Infection: COVID (History) (04/29/21)   Code Status: CPR    Ht: 182.9 cm (72\")   Wt: 117 kg (257 lb 8 oz)    Admission Cmt: None   Principal Problem: None                  Active Insurance as of 5/20/2024       Primary Coverage       Payor Plan Insurance Group Employer/Plan Group    [facility] [place] [id number]        Payor Plan Address Payor Plan Phone Number Payor Plan Fax Number Effective Dates    [facility] OFFICE OF COMMUNITY CARE [phone]  5/20/2024 - None Entered    [address] [address]       [facility] [address]         Subscriber Name Subscriber Birth Date Member ID       CARLOS A GOLDSTEIN JR. 1960 [id number]                     Emergency Contacts        (Rel.) Home Phone Work Phone Mobile Phone    Daniela Goldstein (Spouse) [phone]             [facility] Encounter Date/Time: 5/20/2024 2009   Hospital Account: [id number]    MRN: [id number]   Patient:  Carlos A Goldstein JrGloria   Contact Serial #: [id number] "   SSN:          ENCOUNTER             Patient Class: Observation   Unit: [facility] ED   Hospital Service: Medicine     Bed: 40/40   Admitting Provider: Willard Montana DO   Referring Physician:     Attending Provider: Willard Montana DO   Adm Diagnosis: Chest pain [R07.9]               PATIENT             Name: Carlos A Goldstein Jr. : 1960 (64 yrs)   Address: [address] [place] [address] Sex: Male   City: [place] [address]   County: [place]   Marital Status:  Ethnicity: NOT                                                                         Race: WHITE   Primary Care Provider: Vinayak Correia PA Patients Phone: Home Phone: [phone]     Mobile Phone: [phone]     EMERGENCY CONTACT   Contact Name Legal Guardian? Relationship to Patient Home Phone Work Phone Mobile Phone   1. Daniela Goldstein  2. *No Contact Specified* No    Spouse    [phone]      GUARANTOR             Guarantor: Carlos A Goldstein     : 1960   Address: [address] [place] [address] Sex: [place] [address]     Relation to Patient: Self       Home Phone: [phone]   Guarantor ID: [id number]       Work Phone:     GUARANTOR EMPLOYER   Employer:           Status: DISABLED   COVERAGE          PRIMARY INSURANCE   Payor: [employer] [place] [id number]   Group Number:   Insurance Type: INDEMNITY   Subscriber Name: CARLOS A GOLDSTEIN JR. Subscriber : 1960   Subscriber ID: [id number] Coverage Address: [facility] OFFICE OF COMMUNITY CARE   [address] [address]  [place] [address]   Pat. Rel. to Subscriber: Self Coverage Phone: [phone]   SECONDARY INSURANCE   Payor: N/A Plan: N/A   Group Number:   Insurance Type:     Subscriber Name:   Subscriber :     Subscriber ID:   Coverage Address:     Pat. Rel. to Subscriber:   Coverage Phone:        Contact Serial # ([id number])         May 21, 2024    Chart ID (No chart ID available)              Expand All  Collapse All       Subjective  History of Present Illness  Patient is 60-year-old who was at the [facility] and had some chest pain and shortness of breath who was in the ED because in the past she has had coronary disease and also got a history aortic aneurysm.  The chest pain was pressure-like sensation did not radiate anywhere else.  Were not sharp stabbing characteristic was associate with diaphoresis and some shortness of breath.  No fever     Chest Pain  Pain location:  Substernal area  Pain quality: aching and pressure    Pain radiates to:  Does not radiate  Pain severity:  Moderate  Onset quality:  Sudden  Progression:  Resolved  Chronicity:  New  Context: not breathing, not drug use and not eating    Relieved by:  Nothing  Worsened by:  Nothing  Ineffective treatments:  None tried  Associated symptoms: shortness of breath    Associated symptoms: no abdominal pain, no AICD problem, no anorexia, no anxiety, no back pain, no fever, no headache, no heartburn, no nausea, no numbness, no orthopnea, no palpitations and no weakness    Risk factors: coronary artery disease, diabetes mellitus, high cholesterol, hypertension and male sex    Risk factors: no aortic disease, no immobilization, no Marfan's syndrome, no prior DVT/PE, no smoking and no surgery    Shortness of Breath  Associated symptoms: chest pain    Associated symptoms: no abdominal pain, no fever, no headaches and no neck pain          Review of Systems   Constitutional: Negative.  Negative for fever.   HENT: Negative.     Respiratory:  Positive for shortness of breath.    Cardiovascular:  Positive for chest pain. Negative for palpitations and orthopnea.   Gastrointestinal: Negative.  Negative for abdominal distention, abdominal pain, anorexia, heartburn and nausea.   Endocrine: Negative.    Genitourinary: Negative.    Musculoskeletal: Negative.  Negative for back pain and neck pain.   Skin:  Negative for color change and pallor.   Neurological:  Negative.  Negative for syncope, weakness, light-headedness, numbness and headaches.   Hematological: Negative.  Does not bruise/bleed easily.   All other systems reviewed and are negative.        Medical History        Past Medical History:   Diagnosis Date    Arthritis      Asthma      Cancer       skin    Carotid disease, bilateral      Cellulitis       right foot - on antibiotics 6-    Chest pain      Chronic diastolic congestive heart failure 09/07/2019    Chronic sinusitis      Maribell bullosa      Coronary artery disease involving native coronary artery of native heart with unstable angina pectoris 01/17/2017    Deviated septum      Diabetes mellitus      Difficulty urinating      Diverticulitis      Enlarged prostate      Fatty liver      GERD (gastroesophageal reflux disease)      [place] (hard of hearing)       Does have hearing aids    Hyperlipidemia LDL goal <70 02/02/2017    Hypertrophy of nasal turbinates      Keratoderma      Kidney stone      Lung nodules       lower right lung    Migraine      Murmur, heart      Myocardial infarction      Obesity      Paroxysmal atrial fibrillation 07/11/2019    Personal history of COVID-19 07/2021    PONV (postoperative nausea and vomiting)      Primary hypertension 10/16/2016    Psoriasis      Seizures      Sinus congestion      Skin cancer      Sleep apnea       not using cpap    SOB (shortness of breath)      Stroke       mild weakness on right side    UTI (urinary tract infection)              Allergies         Allergies   Allergen Reactions    Flagyl [Metronidazole] Hives    Atorvastatin Other (See Comments) and Myalgia        - LIPITOR -   Muscle cramps    Ciprofloxacin Hives        - CIPRO -             Surgical History         Past Surgical History:   Procedure Laterality Date    CARDIAC CATHETERIZATION   01/2016     Dr. Broadbent; widely patent previously placed stents in the left anterior descending and obstructive disease involving the diagonal branch  which was treated medically    CARDIAC CATHETERIZATION N/A 07/14/2017     Procedure: Left Heart Cath;  Surgeon: Wade Ramey MD;  Location:  [facility] CATH INVASIVE [facility];  Service:     CARDIAC CATHETERIZATION Left 10/15/2018     Procedure: Cardiac Catheterization/Vascular Study;  Surgeon: Wade Ramey MD;  Location:  [facility] CATH INV[facility];  Service: Cardiology    CARDIAC CATHETERIZATION   10/15/2018     Procedure: Functional Flow [place];  Surgeon: Wade Ramey MD;  Location:  [facility] CATH [facility];  Service: Cardiology    CARDIAC CATHETERIZATION N/A 10/15/2018     Procedure: Left ventriculography;  Surgeon: Wade Ramey MD;  Location:  [facility] C[facility];  Service: Cardiology    CARDIAC CATHETERIZATION Left 06/26/2019     Procedure: Cardiac Catheterization/Vascular Study VEL OK  HE WILL WAIT 1 YEAR FOR SHOULDER SURGERY ;  Surgeon: Wade Ramey MD;  Location:  [facility];  Service: Cardiology    CARDIAC CATHETERIZATION Left 04/30/2021     Procedure: Coronary angiography;  Surgeon: Sahil Llamas MD;  Location:  [facility] C[facility];  Service: Cardiology;  Laterality: Left;    CARDIAC CATHETERIZATION N/A 04/30/2021     Procedure: Percutaneous Coronary Intervention;  Surgeon: Sahil Llamas MD;  Location:  [facility];  Service: Cardiology;  Laterality: N/A;    CARDIAC CATHETERIZATION N/A 11/09/2022     Procedure: Left Heart Cath with SVGs;  Surgeon: Wade Ramey MD;  Location:  [facility] C[facility];  Service: Cardiology;  Laterality: N/A;    CARPAL TUNNEL RELEASE         January 2024 and pther hand February 2024    CHOLECYSTECTOMY        CHOLECYSTECTOMY WITH INTRAOPERATIVE CHOLANGIOGRAM N/A 08/01/2018     Procedure: CHOLECYSTECTOMY LAPAROSCOPIC INTRAOPERATIVE CHOLANGIOGRAM;  Surgeon: Shane Ann MD;  Location: [facility] OR;  Service: General    COLONOSCOPY N/A 07/14/2020     Procedure: COLONOSCOPY WITH ANESTHESIA;  Surgeon: Andrew  Anupam FINK DO;  Location: [facility] ENDOSCOPY;  Service: Gastroenterology;  Laterality: N/A;  pre: abdominal pain  post: diverticulosis  Vinayak Correia PA    CORONARY ANGIOPLASTY        CORONARY ARTERY BYPASS GRAFT N/A 07/06/2019     Procedure: CABG X2 WITH LIMA, LEFT LEG OVH, AND PLACEMENT OF LEFT FEMORAL ARTERIAL LINE;  Surgeon: Steven Tang MD;  Location:  [facility] OR;  Service: Cardiothoracic    CORONARY STENT PLACEMENT         x 6    CYSTOSCOPY TRANSURETHRAL RESECTION OF PROSTATE N/A 01/23/2023     Procedure: CYSTOSCOPY TRANSURETHRAL RESECTION OF PROSTATE;  Surgeon: Latrell Pope MD;  Location:  [facility] OR;  Service: Urology;  Laterality: N/A;    ENDOSCOPIC FUNCTIONAL SINUS SURGERY (FESS) Bilateral 12/13/2017     Procedure: PROCEDURE PERFORMED:  Bilateral functional endoscopic anterior ethmoidectomy with bilateral middle meatal antrostomy Septoplasty Right kathia bullosa resection Bilateral inferior turbinate reduction via Coblation;  Surgeon: Mayank Ibarra MD;  Location:  [facility] OR;  Service:     ENDOSCOPY N/A 07/30/2018     Procedure: ESOPHAGOGASTRODUODENOSCOPY WITH ANESTHESIA;  Surgeon: Benitez Mas MD;  Location: [facility] ENDOSCOPY;  Service: Gastroenterology    ENDOSCOPY N/A 07/14/2020     Procedure: ESOPHAGOGASTRODUODENOSCOPY WITH ANESTHESIA;  Surgeon: Anupam Morales DO;  Location: [facility] ENDOSCOPY;  Service: Gastroenterology;  Laterality: N/A;  pre: abdominal pain  post: esophagitis  Vinayak Correia PA    HERNIA REPAIR         x2 inguinal area    KIDNEY STONE SURGERY        KNEE ARTHROSCOPY Right 03/01/2022     Procedure: RIGHT KNEE PARTIAL LATERAL MENISCECTOMY;  Surgeon: Pedro Pablo Song MD;  Location:  [facility] OR;  Service: Orthopedics;  Laterality: Right;    KNEE SURGERY Right      OTHER SURGICAL HISTORY         urolift    PROSTATE SURGERY         Dr. Badillo - 2017    PULMONARY ARTERY PRESSURE SENSOR IMPLANT N/A 05/08/2023     Procedure: PA Pressure Sensor Implant;   Surgeon: Wade Ramey MD;  Location:  [facility] CATH INVASIVE [facility];  Service: Cardiology;  Laterality: N/A;    ROTATOR CUFF REPAIR Right      SLEEP ENDOSCOPY N/A 2023     Procedure: Videosleep endoscopy;  Surgeon: Mayank Ibarra MD;  Location:  [facility] OR;  Service: ENT;  Laterality: N/A;    THUMB AMPUTATION Left       partial    TOE SURGERY         great toe                  Family History   Problem Relation Age of Onset    Heart disease Father      COPD Mother      Hypertension Mother      Asthma Mother      No Known Problems Sister      Colon cancer Paternal Uncle      Prostate cancer Maternal Grandfather      No Known Problems Sister      Colon cancer Maternal Grandmother      Colon polyps Maternal Grandmother           Social History   Social History            Socioeconomic History    Marital status:    Tobacco Use    Smoking status: Former       Current packs/day: 0.00       Average packs/day: 1.5 packs/day for 18.0 years (27.0 ttl pk-yrs)       Types: Cigarettes, Cigars       Start date:        Quit date:        Years since quittin.4       Passive exposure: Past    Smokeless tobacco: Former       Types: Chew       Quit date:    Vaping Use    Vaping status: Never Used   Substance and Sexual Activity    Alcohol use: No       Comment: 0    Drug use: No    Sexual activity: Defer                        Objective[]Expand by Default  Physical Exam  Vitals and nursing note reviewed. Exam conducted with a chaperone present.   Constitutional:       General: He is not in acute distress.     Appearance: Normal appearance. He is well-developed. He is not toxic-appearing.   HENT:      Head: Normocephalic and atraumatic.      Nose: Nose normal.      Mouth/Throat:      Mouth: Mucous membranes are moist.      Pharynx: Uvula midline.   Eyes:      General: Lids are normal. Lids are everted, no foreign bodies appreciated.      Conjunctiva/sclera: Conjunctivae normal.      Pupils: Pupils are  equal, round, and reactive to light.   Neck:      Vascular: Normal carotid pulses. No carotid bruit or JVD.      Trachea: Trachea and phonation normal. No tracheal deviation.   Cardiovascular:      Rate and Rhythm: Normal rate and regular rhythm.      Chest Wall: PMI is not displaced.      Pulses: Normal pulses.      Heart sounds: Normal heart sounds.      No diastolic murmur is present.      No gallop.   Pulmonary:      Effort: Pulmonary effort is normal. No tachypnea, accessory muscle usage or respiratory distress.      Breath sounds: Normal breath sounds. No stridor. No decreased breath sounds, wheezing, rhonchi or rales.   Abdominal:      General: Bowel sounds are normal. There is no distension.      Palpations: Abdomen is soft.      Tenderness: There is no abdominal tenderness.   Musculoskeletal:         General: No swelling. Normal range of motion.      Cervical back: Full passive range of motion without pain, normal range of motion and neck supple. No rigidity.      Right lower leg: No tenderness. No edema.      Left lower leg: No tenderness. No edema.      Comments: Lower extremity exam bilaterally is unremarkable.  There is no right or left calf tenderness .  There is no palpable venous cord.  No obvious difference in the size of the legs.  No pitting edema.  The dorsalis pedis and posterior tibial femoral and popliteal pulses are palpable and +2 bilaterally.  Homans sign is negative   Skin:     General: Skin is warm and dry.      Capillary Refill: Capillary refill takes less than 2 seconds.      Coloration: Skin is not jaundiced or pale.      Nails: There is no clubbing.   Neurological:      General: No focal deficit present.      Mental Status: He is alert and oriented to person, place, and time.      GCS: GCS eye subscore is 4. GCS verbal subscore is 5. GCS motor subscore is 6.      Cranial Nerves: No cranial nerve deficit.      Motor: Motor function is intact.      Gait: Gait normal.      Deep Tendon  Reflexes: Reflexes are normal and symmetric. Reflexes normal.   Psychiatric:         Speech: Speech normal.         Behavior: Behavior normal.            Procedures                 ED Course      ED Course as of 05/20/24 2210   Mon May 20, 2024   2102 [place] for PE 0  Years Algorithm for Pulmonary Embolus  To be used in hemodynamically stable patient >18 years old     No signs of DVT are present, there is no hemoptysis, PE is not the most likely diagnosis and the D-dimer is not greater or equal to 1000 ng/mL. Therefore PE is excluded . The Years algorithm rules out PE (0.43 % with symptomatic VTE during 3-month follow-up)   [TS]   2156 Coronary angiography:  Mild stenosis of left main coronary artery without obstructive disease  Left anterior descending coronary artery has multiple stents in the proximal and midportion after which the vessel is occluded  No obstructive disease of diagonal branch  No significant disease of main trunk of left circumflex coronary artery  First obtuse marginal branch has 50% stenosis with patent bypass graft  Second and third obtuse marginal branch does not have any obstructive disease  Patent saphenous venous graft to the obtuse marginal branch  Patent left internal mammary artery graft to the left anterior descending coronary artery  Right coronary artery has mild atherosclerotic changes in the proximal and midportion without obstructive  Discussed the cath in September 2022 [TS]   2207 Patient's workup essentially negative dimer is negative and the troponins are negative with a baseline renal insufficiency.  I have discussed this case at length with the patient the last cardiac catheterization was in 2022 he has not had a stress test since then as far as I know.  The patient was offered an outpatient evaluation assessment but he wants to stay in the hospital and get a stress test as an inpatient.  I have discussed this case with the hospitalist agreeable and admitting the patient to  observation for stress testing. [TS]       ED Course User Index  [TS] Nj Guidry MD                 HEART Score: 6                           Medical Decision Making  Differential Diagnosis:  I considered chest wall pain, muscle strain, costochondritis, pleurisy, rib fracture, herpes zoster, cardiovascular etiology, myocardial infarction, intermediate coronary syndrome, unstable angina, angina, aortic dissection, pericarditis, pulmonary etiology, pulmonary embolism, pneumonia, pneumothorax, lung cancer, gastroesophageal reflux disease, esophagitis, esophageal spasm and gastrointestinal etiology as a possible cause of chest pain in this patient. This is a partial list of diagnoses considered.          Problems Addressed:  Chest pain, unspecified type: acute illness or injury     Details: Chest pain not have any pain at this time.  EKG nonspecific findings.  Heart score is elevated.  I have discussed this case of the patient he does not want to have an outpatient evaluation wants an inpatient stress testing.  Dyspnea, unspecified type: acute illness or injury     Details: No evidence of PE no pneumonia no CHF.     Amount and/or Complexity of Data Reviewed  Labs:      Details: Chronic renal insufficiency with mild RTA.  Discussion of management or test interpretation with external provider(s): Discussed with the hospitalist     Risk  Decision regarding hospitalization.  Risk Details: Well score is 0  Heart score is moderate.  Years Algorithm for Pulmonary Embolus  To be used in hemodynamically stable patient >18 years old     No signs of DVT are present, there is no hemoptysis, PE is not the most likely diagnosis and the D-dimer is not greater or equal to 1000 ng/mL. Therefore PE is excluded . The Years algorithm rules out PE (0.43 % with symptomatic VTE during 3-month follow-up)     Will be admitted for stress test.           Final diagnoses:   Chest pain, unspecified type   Dyspnea, unspecified type         ED  Disposition  ED Disposition         ED Disposition   Decision to Admit    Condition   --    Comment   Level of Care: Telemetry [5]   Diagnosis: Chest pain [397284]   Admitting Physician: ELGIN UREÑA [[id number]]   Attending Physician: ELGIN UREÑA [[id number]]                      No follow-up provider specified.         Medication List       No changes were made to your prescriptions during this visit.               Nj Guidry MD  05/20/24 2045        Nj Guidry MD  05/20/24 2210           Ashtyn Beckman APRN   Nurse Practitioner  Hospitalist     H&P      Cosign Needed     Date of Service: 05/20/24 2159  Creation Time: 05/20/24 2159     Cosign Needed       Expand All Collapse All         [facility]  HISTORY AND PHYSICAL     Date of Admission: 5/20/2024  Primary Care Physician: Vinayak Correia PA     Subjective   Primary Historian: Patient and wife     Chief Complaint: Chest pain     History of Present Illness  Carlos A BoyerJr. is a 64-year-old male with a past medical history of coronary artery disease with stents, MI, insulin-dependent diabetes-poorly controlled, bilateral carotid artery disease, nonhealing diabetic wound right foot followed by [facility] wound care, paroxysmal atrial fibrillation on Eliquis, please see below for complete list.  [facility] when after walking and becoming hot he developed left-sided chest pain that radiated into the neck.  He did not report chest pain to the providers at that facility.  He states once he cooled off and rested it went away.  He did not take his nitroglycerin because he forgot about them.  He states after he walked out into the parking lot and drove home he seemed to be doing okay until he arrived at home and again while walking in the heat he developed same type of pain.  He has no associated shortness of breathing or nausea.  He states he did have nausea after  arrival to the ED, not associated with the chest pain.  He has had none since arrival.  Troponins are negative.  Patient has a specialty shoe right foot, wife states she changes dressing daily.  We will consult wound nurse.  Patient will not be able to ambulate for stress test.  He has no other complaints.  He is admitted for further evaluation and treatment.        Review of Systems   Otherwise complete ROS reviewed and negative except as mentioned in the HPI.     Past Medical History:   Medical History[]Expand by Default        Past Medical History:   Diagnosis Date    Arthritis      Asthma      Cancer       skin    Carotid disease, bilateral      Cellulitis       right foot - on antibiotics 6-    Chest pain      Chronic diastolic congestive heart failure 09/07/2019    Chronic sinusitis      Maribell bullosa      Coronary artery disease involving native coronary artery of native heart with unstable angina pectoris 01/17/2017    Deviated septum      Diabetes mellitus      Difficulty urinating      Diverticulitis      Enlarged prostate      Fatty liver      GERD (gastroesophageal reflux disease)      [place] (hard of hearing)       Does have hearing aids    Hyperlipidemia LDL goal <70 02/02/2017    Hypertrophy of nasal turbinates      Keratoderma      Kidney stone      Lung nodules       lower right lung    Migraine      Murmur, heart      Myocardial infarction      Obesity      Paroxysmal atrial fibrillation 07/11/2019    Personal history of COVID-19 07/2021    PONV (postoperative nausea and vomiting)      Primary hypertension 10/16/2016    Psoriasis      Seizures      Sinus congestion      Skin cancer      Sleep apnea       not using cpap    SOB (shortness of breath)      Stroke       mild weakness on right side    UTI (urinary tract infection)           Past Surgical History:  Surgical History         Past Surgical History:   Procedure Laterality Date    CARDIAC CATHETERIZATION   01/2016     Dr. Broadbent;  widely patent previously placed stents in the left anterior descending and obstructive disease involving the diagonal branch which was treated medically    CARDIAC CATHETERIZATION N/A 07/14/2017     Procedure: Left Heart Cath;  Surgeon: Wade Ramey MD;  Location:  [facility] CATH INVASIVE [facility];  Service:     CARDIAC CATHETERIZATION Left 10/15/2018     Procedure: Cardiac Catheterization/Vascular Study;  Surgeon: Wade Ramey MD;  Location:  [facility] CATH INVASIVE [facility];  Service: Cardiology    CARDIAC CATHETERIZATION   10/15/2018     Procedure: Functional Flow [place];  Surgeon: Wade Ramey MD;  Location:  [facility];  Service: Cardiology    CARDIAC CATHETERIZATION N/A 10/15/2018     Procedure: Left ventriculography;  Surgeon: Wade Ramey MD;  Location:  [facility];  Service: Cardiology    CARDIAC CATHETERIZATION Left 06/26/2019     Procedure: Cardiac Catheterization/Vascular Study VEL OK  HE WILL WAIT 1 YEAR FOR SHOULDER SURGERY ;  Surgeon: Wade Ramey MD;  Location:  [facility];  Service: Cardiology    CARDIAC CATHETERIZATION Left 04/30/2021     Procedure: Coronary angiography;  Surgeon: Sahil Llamas MD;  Location:  [facility];  Service: Cardiology;  Laterality: Left;    CARDIAC CATHETERIZATION N/A 04/30/2021     Procedure: Percutaneous Coronary Intervention;  Surgeon: Sahil Llamas MD;  Location:  [facility];  Service: Cardiology;  Laterality: N/A;    CARDIAC CATHETERIZATION N/A 11/09/2022     Procedure: Left Heart Cath with SVGs;  Surgeon: Wade Ramey MD;  Location:  [facility];  Service: Cardiology;  Laterality: N/A;    CARPAL TUNNEL RELEASE         January 2024 and pther hand February 2024    CHOLECYSTECTOMY        CHOLECYSTECTOMY WITH INTRAOPERATIVE CHOLANGIOGRAM N/A 08/01/2018     Procedure: CHOLECYSTECTOMY LAPAROSCOPIC INTRAOPERATIVE CHOLANGIOGRAM;  Surgeon: Shane Ann MD;   Location:  [facility] OR;  Service: General    COLONOSCOPY N/A 07/14/2020     Procedure: COLONOSCOPY WITH ANESTHESIA;  Surgeon: Anupam Morales DO;  Location: [facility] ENDOSCOPY;  Service: Gastroenterology;  Laterality: N/A;  pre: abdominal pain  post: diverticulosis  Vinayak Correia PA    CORONARY ANGIOPLASTY        CORONARY ARTERY BYPASS GRAFT N/A 07/06/2019     Procedure: CABG X2 WITH LIMA, LEFT LEG OVH, AND PLACEMENT OF LEFT FEMORAL ARTERIAL LINE;  Surgeon: Steven Tang MD;  Location:  [facility] OR;  Service: Cardiothoracic    CORONARY STENT PLACEMENT         x 6    CYSTOSCOPY TRANSURETHRAL RESECTION OF PROSTATE N/A 01/23/2023     Procedure: CYSTOSCOPY TRANSURETHRAL RESECTION OF PROSTATE;  Surgeon: Latrell Pope MD;  Location:  [facility] OR;  Service: Urology;  Laterality: N/A;    ENDOSCOPIC FUNCTIONAL SINUS SURGERY (FESS) Bilateral 12/13/2017     Procedure: PROCEDURE PERFORMED:  Bilateral functional endoscopic anterior ethmoidectomy with bilateral middle meatal antrostomy Septoplasty Right kathia bullosa resection Bilateral inferior turbinate reduction via Coblation;  Surgeon: Mayank Ibarra MD;  Location: [facility] OR;  Service:     ENDOSCOPY N/A 07/30/2018     Procedure: ESOPHAGOGASTRODUODENOSCOPY WITH ANESTHESIA;  Surgeon: Benitez Mas MD;  Location: [facility] ENDOSCOPY;  Service: Gastroenterology    ENDOSCOPY N/A 07/14/2020     Procedure: ESOPHAGOGASTRODUODENOSCOPY WITH ANESTHESIA;  Surgeon: Anupam Morales DO;  Location: [facility] ENDOSCOPY;  Service: Gastroenterology;  Laterality: N/A;  pre: abdominal pain  post: esophagitis  Vinayak Correia PA    HERNIA REPAIR         x2 inguinal area    KIDNEY STONE SURGERY        KNEE ARTHROSCOPY Right 03/01/2022     Procedure: RIGHT KNEE PARTIAL LATERAL MENISCECTOMY;  Surgeon: Pedro Pablo Song MD;  Location:  [facility] OR;  Service: Orthopedics;  Laterality: Right;    KNEE SURGERY Right      OTHER SURGICAL HISTORY         urolift    PROSTATE SURGERY          Dr. Badillo - 2017    PULMONARY ARTERY PRESSURE SENSOR IMPLANT N/A 05/08/2023     Procedure: PA Pressure Sensor Implant;  Surgeon: Wade Ramey MD;  Location:  [facility];  Service: Cardiology;  Laterality: N/A;    ROTATOR CUFF REPAIR Right      SLEEP ENDOSCOPY N/A 02/27/2023     Procedure: Videosleep endoscopy;  Surgeon: Mayank Ibarra MD;  Location:  [facility] OR;  Service: ENT;  Laterality: N/A;    THUMB AMPUTATION Left       partial    TOE SURGERY         great toe         Social History:  reports that he quit smoking about 31 years ago. His smoking use included cigarettes and cigars. He started smoking about 49 years ago. He has a 27 pack-year smoking history. He has been exposed to tobacco smoke. He quit smokeless tobacco use about 15 years ago.  His smokeless tobacco use included chew. He reports that he does not drink alcohol and does not use drugs.     Family History: family history includes Asthma in his mother; COPD in his mother; Colon cancer in his maternal grandmother and paternal uncle; Colon polyps in his maternal grandmother; Heart disease in his father; Hypertension in his mother; No Known Problems in his sister and sister; Prostate cancer in his maternal grandfather.        Allergies:  Allergies         Allergies   Allergen Reactions    Flagyl [Metronidazole] Hives    Atorvastatin Other (See Comments) and Myalgia        - LIPITOR -   Muscle cramps    Ciprofloxacin Hives        - CIPRO -             Medications:  No current facility-administered medications on file prior to encounter.             Current Outpatient Medications on File Prior to Encounter   Medication Sig Dispense Refill    acetaminophen (TYLENOL) 325 MG tablet Take 1 tablet by mouth Every 6 (Six) Hours As Needed for Mild Pain.        albuterol (PROVENTIL HFA;VENTOLIN HFA) 108 (90 BASE) MCG/ACT inhaler Inhale 2 puffs Every 6 (Six) Hours As Needed for Wheezing.        apixaban (ELIQUIS) 5 MG tablet tablet Take 1  tablet by mouth 2 (Two) Times a Day.        Aspirin 81 MG capsule Take 81 mg by mouth Daily. Dr. Ramey or Dr. Tang        calcium polycarbophil (FIBERCON) 625 MG tablet Take 1 tablet by mouth Daily As Needed.        carboxymethylcellulose (REFRESH PLUS) 0.5 % solution Administer 1 drop to both eyes 4 (Four) Times a Day As Needed for Dry Eyes.        empagliflozin (JARDIANCE) 10 MG tablet tablet Take 1 tablet by mouth Daily.        ezetimibe (ZETIA) 10 MG tablet Take 1 tablet by mouth Daily.        fluticasone (FLONASE) 50 MCG/ACT nasal spray 2 sprays into the nostril(s) as directed by provider Daily As Needed for Rhinitis or Allergies.        furosemide (Lasix) 40 MG tablet Take 1 tablet by mouth Daily As Needed (take as needed for weight gain more than 2 pounds in a day or more than than 3 pounds in 2 days.). 90 tablet 1    gabapentin (NEURONTIN) 300 MG capsule Take 1 capsule by mouth Every Night.        guaiFENesin (MUCINEX) 600 MG 12 hr tablet Take 2 tablets by mouth Daily As Needed for Congestion.        insulin aspart (novoLOG FLEXPEN) 100 UNIT/ML solution pen-injector sc pen Inject 50 Units under the skin into the appropriate area as directed 3 (Three) Times a Day With Meals. Can use 5 to 6 more units if needed in early evening.        INSULIN GLARGINE-YFGN SC Inject 100 Units under the skin into the appropriate area as directed Every Night.        INSULIN GLARGINE-YFGN SC Inject 80 Units under the skin into the appropriate area as directed Daily.        isosorbide mononitrate (IMDUR) 120 MG 24 hr tablet Take 1 tablet by mouth Daily. 90 tablet 3    lamoTRIgine (LaMICtal) 200 MG tablet Take 1 tablet by mouth 2 (Two) Times a Day. 180 tablet 1    levETIRAcetam (KEPPRA) 500 MG tablet Take 3 tablets by mouth Every Morning. 90 tablet 5    levETIRAcetam (KEPPRA) 500 MG tablet Take 4 tablets by mouth Every Night. 120 tablet 5    metFORMIN (GLUCOPHAGE) 1000 MG tablet Take 1 tablet by mouth 2 (Two) Times a Day With  "Meals.        metoprolol tartrate (LOPRESSOR) 100 MG tablet Take 1 tablet by mouth 2 (Two) Times a Day. Told to take the DOS-2/27/23, w/ sip of water.        Multiple Vitamin (MULTI VITAMIN PO) Take 1 tablet by mouth Daily.        nitroglycerin (NITROSTAT) 0.4 MG SL tablet Place 1 tablet under the tongue Every 5 (Five) Minutes As Needed for Chest Pain. Take no more than 3 doses in 15 minutes. 25 tablet 2    pantoprazole (PROTONIX) 40 MG EC tablet Take 1 tablet by mouth 2 (Two) Times a Day.        polyethylene glycol (MIRALAX) 17 GM/SCOOP powder Take two scoops twice a day until bowel movements normalize 578 g 0    psyllium (METAMUCIL) 58.6 % packet Take 1 packet by mouth Daily As Needed (constipation).        Rimegepant Sulfate (Nurtec) 75 MG tablet dispersible tablet Take 1 tablet by mouth As Needed (Migraine). 8 tablet 5    rosuvastatin (CRESTOR) 20 MG tablet Take 1 tablet by mouth Every Night.        sacubitril-valsartan (Entresto)  MG tablet Take 1 tablet by mouth 2 (Two) Times a Day.        Semaglutide,0.25 or 0.5MG/DOS, (Ozempic, 0.25 or 0.5 MG/DOSE,) 2 MG/1.5ML solution pen-injector Inject  under the skin into the appropriate area as directed 1 (One) Time Per Week.        spironolactone (ALDACTONE) 50 MG tablet Take 1 tablet by mouth Daily.        tamsulosin (FLOMAX) 0.4 MG capsule 24 hr capsule Take 1 capsule by mouth Daily.          I have utilized all available immediate resources to obtain, update, or review the patient's current medications (including all prescriptions, over-the-counter products, herbals, cannabis/cannabidiol products, and vitamin/mineral/dietary (nutritional) supplements).     Objective      Vital Signs: /83   Pulse 60   Temp 98.1 °F (36.7 °C) (Oral)   Resp 18   Ht 182.9 cm (72\")   Wt 116 kg (255 lb 11.7 oz)   SpO2 93%   BMI 34.68 kg/m²   Physical Exam  Vitals reviewed.   Constitutional:       Appearance: Normal appearance.   HENT:      Head: Normocephalic and " atraumatic.      Mouth/Throat:      Mouth: Mucous membranes are moist.      Pharynx: Oropharynx is clear.      Comments: Poor dentition  Eyes:      Extraocular Movements: Extraocular movements intact.      Conjunctiva/sclera: Conjunctivae normal.   Cardiovascular:      Rate and Rhythm: Normal rate and regular rhythm.   Pulmonary:      Effort: Pulmonary effort is normal.      Breath sounds: Normal breath sounds.   Abdominal:      General: Abdomen is protuberant.      Palpations: Abdomen is soft.   Musculoskeletal:      Cervical back: Normal range of motion and neck supple.      Right lower leg: No edema.      Left lower leg: No edema.      Comments: Generalized weakness and debility   Skin:     General: Skin is warm and dry.      Comments: Nonhealing wound right foot   Neurological:      General: No focal deficit present.      Mental Status: He is alert and oriented to person, place, and time.   Psychiatric:         Mood and Affect: Mood normal.         Behavior: Behavior normal.         Results Reviewed:  Lab Results (last 24 hours)         Procedure Component Value Units Date/Time     Respiratory Panel PCR w/COVID-19(SARS-CoV-2) [facility] In-House, NP Swab in UTM/VTM, 2 HR TAT - Swab, Nasopharynx [[id number]]  (Normal) Collected: 05/20/24 2006     Specimen: Swab from Nasopharynx Updated: 05/20/24 7861       ADENOVIRUS, PCR Not Detected       Coronavirus 229E Not Detected       Coronavirus HKU1 Not Detected       Coronavirus NL63 Not Detected       Coronavirus OC43 Not Detected       COVID19 Not Detected       Human Metapneumovirus Not Detected       Human Rhinovirus/Enterovirus Not Detected       Influenza A PCR Not Detected       Influenza B PCR Not Detected       Parainfluenza Virus 1 Not Detected       Parainfluenza Virus 2 Not Detected       Parainfluenza Virus 3 Not Detected       Parainfluenza Virus 4 Not Detected       RSV, PCR Not Detected       Bordetella pertussis pcr Not Detected        Bordetella parapertussis PCR Not Detected       Chlamydophila pneumoniae PCR Not Detected       Mycoplasma pneumo by PCR Not Detected     BNP [[id number]]  (Normal) Collected: 05/20/24 2005     Specimen: Blood Updated: 05/20/24 2055       proBNP 82.8 pg/mL       D-dimer, Quantitative [[id number]]  (Normal) Collected: 05/20/24 2005     Specimen: Blood Updated: 05/20/24 2051       D-Dimer, Quantitative 0.35 MCGFEU/mL       CBC Auto Differential [[id number]]  (Abnormal) Collected: 05/20/24 2005     Specimen: Blood Updated: 05/20/24 2043       WBC 6.08 10*3/mm3         RBC 4.64 10*6/mm3         Hemoglobin 13.8 g/dL         Hematocrit 42.3 %         MCV 91.2 fL         MCH 29.7 pg         MCHC 32.6 g/dL         RDW 13.4 %         RDW-SD 44.6 fl         MPV 11.8 fL         Platelets 102 10*3/mm3         Neutrophil % 42.8 %         Lymphocyte % 40.6 %         Monocyte % 8.9 %         Eosinophil % 2.6 %         Basophil % 1.3 %         Immature Grans % 3.8 %         Neutrophils, Absolute 2.60 10*3/mm3         Lymphocytes, Absolute 2.47 10*3/mm3         Monocytes, Absolute 0.54 10*3/mm3         Eosinophils, Absolute 0.16 10*3/mm3         Basophils, Absolute 0.08 10*3/mm3         Immature Grans, Absolute 0.23 10*3/mm3       Comprehensive Metabolic Panel [[id number]]  (Abnormal) Collected: 05/20/24 2005     Specimen: Blood Updated: 05/20/24 2039       Glucose 119 mg/dL         BUN 22 mg/dL         Creatinine 1.29 mg/dL         Sodium 139 mmol/L         Potassium 4.5 mmol/L         Comment: Slight hemolysis detected by analyzer. Result may be falsely elevated.          Chloride 107 mmol/L         CO2 21.0 mmol/L         Calcium 9.6 mg/dL         Total Protein 7.1 g/dL         Albumin 4.2 g/dL         ALT (SGPT) 22 U/L         AST (SGOT) 21 U/L         Comment: Slight hemolysis detected by analyzer. Result may be falsely elevated.          Alkaline Phosphatase 74 U/L         Total Bilirubin 0.6 mg/dL         Globulin 2.9 gm/dL          A/G Ratio 1.4 g/dL         BUN/Creatinine Ratio 17.1       Anion Gap 11.0 mmol/L         eGFR 61.2 mL/min/1.73       High Sensitivity Troponin T [[id number]]  (Normal) Collected: 05/20/24 2005     Specimen: Blood Updated: 05/20/24 2034       HS Troponin T 12 ng/L               Imaging Results (Last 24 Hours)         Procedure Component Value Units Date/Time     XR Chest 1 View [[id number]] Collected: 05/20/24 2043       Updated: 05/20/24 2047     Narrative:       EXAMINATION:  XR CHEST 1 VW-  5/20/2024 7:24 PM     HISTORY: Chest pain triage protocol.     COMPARISON: 9/21/2023. 6/28/2023.     TECHNIQUE: Single view AP image.     FINDINGS: There is mild hypoventilation with vascular crowding. Coarse  markings and bronchial wall thickening appear stable. No dense  infiltrate is seen. No significant pleural effusion is seen. There is  scoliosis and degenerative change of the visualized spine.           Impression:       1. Mild hypoventilation with vascular crowding.  2. Coarse markings and bronchial wall thickening, stable. No new  infiltrate or effusion.           This report was signed and finalized on 5/20/2024 8:44 PM by Dr. Wojciech Davidson MD.                5/8/2023  Conclusion: Successful placement of pulmonary artery pressure sensor 5/8/2023. Successful pulmonary arteriogram of the left side     4/18/2023 ECHO  Interpretation Summary     Left ventricular systolic function is normal. Left ventricular ejection fraction appears to be 61 - 65%.    The following left ventricular wall segments are very mildly hypokinetic: apical inferior, apical septal and apex hypokinetic.    Left ventricular diastolic function is consistent with (grade II w/high LAP) pseudonormalization.    Left atrial volume is mildly increased.    Estimated right ventricular systolic pressure from tricuspid regurgitation is normal (<35 mmHg).    Normal size and function of the right ventricle.    No significant valvular pathology.    No  significant change compared to prior exam from 4/29/21.        Assessment / Plan   Assessment:        Active Hospital Problems     Diagnosis      Chest pain      Presence of CardioMEMS HF system      Chronic anticoagulation      S/P CABG x 2      Chronic diastolic congestive heart failure      Paroxysmal atrial fibrillation      Class 1 obesity due to excess calories with serious comorbidity and body mass index (BMI) of 34.0 to 34.9 in adult      Coronary artery disease involving native coronary artery of native heart without angina pectoris      Type 2 diabetes mellitus with circulatory disorder, with long-term current use of insulin           Treatment Plan  1.  The patient will be admitted to Dr. Boone's service here at [facility].   2.  Lexiscan in a.m.  3.  N.p.o. after midnight, oral care  4.  Home medications reviewed and restarted as appropriate  5.  DVT prophylaxis with ongoing Eliquis use  6.  Supplemental oxygen, incentive spirometry, continuous pulse oximetry, daily weights  7.  Labs in a.m.  8.  Consult wound care-followed by [facility]     Medical Decision Making  Number and Complexity of problems: 9  Differential Diagnosis: None     Conditions and Status        Condition is unchanged.     [facility] Data  External documents reviewed: No  Cardiac tracing (EKG, telemetry) interpretation: Reviewed  Radiology interpretation: Reviewed  Labs reviewed: Yes  Any tests that were considered but not ordered: No     Decision rules/scores evaluated (example [id number], Wells, etc): No     Discussed with: Patient, wife and Dr. Jett     Care Planning  Shared decision making: Patient, wife and Dr. Jett  Code status and discussions: Full     Disposition  Social Determinants of Health that impact treatment or disposition: None  Estimated length of stay is 1-2 days.      I confirmed that the patient's advanced care plan is present, code status is documented, and a surrogate decision maker is listed in the  patient's medical record.      The patient's surrogate decision maker is wife.      The patient was seen and examined by me on 5/20/2024 at 9:59 PM.     Electronically signed by TEENA Leung, 05/20/24, 23:49 CDT.                     Temp Pulse Resp BP Patient Position Device (Oxygen Therapy) SpO2    05/21/24 0720 97.4 (36.3) -- 18 127/58 Sitting -- --   05/21/24 0327 -- 58 -- -- -- -- 94   05/21/24 0300 -- 60 -- -- -- -- 96   05/21/24 0158 -- -- -- -- -- -- 95   05/21/24 0156 -- 61 -- 119/54 -- -- --   05/21/24 0004 -- 63 -- 143/95 -- -- --   05/20/24 2326 -- -- -- -- -- -- 93   05/20/24 2325 -- 60 -- -- -- -- --   05/20/24 2316 -- -- -- 149/83 -- -- --   05/20/24 2221 -- -- -- -- -- -- 96   05/20/24 2216 -- 62 -- 158/96 -- -- --   05/20/24 2207 -- -- -- -- -- -- 98   05/20/24 2205 -- 60 -- 142/78 -- -- --   05/20/24 2140 -- 60 -- -- -- -- 96   05/20/24 2131 -- -- -- 118/56 -- -- --   05/20/24 2116 -- 58 -- 137/79 -- -- --   05/20/24 2113 -- -- -- -- -- -- 94   05/20/24 20:50:44 98.1 (36.7) 58 18 130/64 Sitting room air 95   05/20/24 20:17:20 97.7 (36.5) 63 20 136/72 Sitting room air 97   05/20/24 1732 98.2 (36.8) 66 18 128/66 -- -- 98     Intake/Output        CG 12 Lead Chest Pain: Patient Communication     Not Released  Not seen     Results  ECG 12 Lead Chest Pain (Order [id number])  Order-Level Documents:    Scan on 5/20/2024 2017 by [employer], [place]: ECG 12-LEAD         Author: -- Service: -- Author Type: --   Filed: Date of Service: Creation Time:   Status: (Other)   Test Reason : Chest Pain  Blood Pressure :   */*   mmHG  Vent. Rate :  66 BPM     Atrial Rate :  66 BPM     P-R Int : 242 ms          QRS Dur : 106 ms      QT Int : 414 ms       P-R-T Axes :  -9  19 110 degrees     QTc Int : 434 ms     Sinus rhythm with 1st degree AV block  Anterior infarct (cited on or before 17-APR-2023)  ST & T wave abnormality, consider lateral ischemia  Abnormal ECG  When compared with ECG of 21-SEP-2023  11:31,  PA interval has increased  Minimal criteria for Inferior infarct are no longer Present  Serial changes of Anterior infarct Present     Referred By: ELIZABETH               5/21/24 NPO FOR STRESS TEST TODAY.      Stress Test With Myocardial Perfusion One Day [[id number]] (Order [id number])  Order  Date: 5/20/2024 Department: [facility] EMERGENCY DEPARTMENT Released By/Authorizing: Ashtyn Beckman APRN (auto-released)          Encounter Date    5/20/24    XR Chest 1 View [[id number]] (Order [id number])  Order  Status: Final result     Patient Location    Patient Class Location   Observation [facility] EMERGENCY DEPT, 40, 40     [phone]     Appointment Information    PACS Images     Radiology Images  Study Result    Narrative & Impression   EXAMINATION:  XR CHEST 1 VW-  5/20/2024 7:24 PM     HISTORY: Chest pain triage protocol.     COMPARISON: 9/21/2023. 6/28/2023.     TECHNIQUE: Single view AP image.     FINDINGS: There is mild hypoventilation with vascular crowding. Coarse  markings and bronchial wall thickening appear stable. No dense  infiltrate is seen. No significant pleural effusion is seen. There is  scoliosis and degenerative change of the visualized spine.        IMPRESSION:  1. Mild hypoventilation with vascular crowding.  2. Coarse markings and bronchial wall thickening, stable. No new  infiltrate or effusion.           This report was signed and finalized on 5/20/2024 8:44 PM by Dr. Wojciech Davidson MD.        Metabolic Panel  Order: [id number]  Status: Final result       Visible to patient: No (scheduled for 5/21/2024  6:58 AM)       Next appt: Today at 11:35 AM in Cardiology ([facility] Scan Room)    Specimen Information: Blood   0 Result Notes            Component  Ref Range & Units 05:26  (5/21/24)  [place] 1 d ago  (5/20/24)  Liliana/[place] 7 d ago  (5/14/24)  [place]il[place] 3 mo ago  (2/14/24)  [place] 8 mo ago  (9/21/23)  [place] 9 mo  ago  (8/25/23)  [facility] 10 mo ago  (7/18/23)  [facility]   Glucose  65 - 99 mg/dL 186 High  119 High  129 High   116 High  184 High  R    BUN  8 - 23 mg/dL 24 High  22 21  17 29 High  R    Creatinine  0.76 - 1.27 mg/dL 1.13 1.29 High  1.18 1.20 R, CM 0.94 2.81 High  R 1.70 High  R, CM   Sodium  136 - 145 mmol/L 141 139 141  140 135 R    Potassium  3.5 - 5.2 mmol/L 4.3              C  4.14 - 5.80 10*6/mm3 4.64 4.93 4.54 4.61 4.18 4.66 4.61   Hemoglobin  13.0 - 17.7 g/dL 13.8 14.5 13.4 13.7 12.2 Low  13.4 13.4   Hematocrit  37.5 - 51.0 % 42.3 46.0 41.9 43.2 39.1 42.7 42.1   MCV  79.0 - 97.0 fL 91.2 93.3 92.3 93.7 93.5 91.6 91.3   MCH  26.6 - 33.0 pg 29.7 29.4 29.5 29.7 29.2 28.8 29.1   MCHC  31.5 - 35.7 g/dL 32.6 31.5 32.0 31.7 31.2 Low  31.4 Low  31.8   RDW  12.3 - 15.4 % 13.4 13.4 12.9 13.1 13.3 13.2 13.4   RDW-SD  37.0 - 54.0 fl 44.6 45.8 43.8 44.8 45.0 44.3 44.7   MPV  6.0 - 12.0 fL 11.8 10.8 11.2 11.8 11.4 11.8 11.6   Platelets  140 - 450 10*3/mm3 102 Low  141 132 Low  127 Low  107 Low  130 Low  112 Low    Neutrophil %  42.7 - 76.0 % 42.8 52.3 68.6 51.3 61.6     Lymphocyte %  19.6 - 45.3 % 40.6 34.3 21.3 35.9 26.2     Monocyte %  5.0 - 12.0 % 8.9 9.2 7.7 8.4 9.1     Eosinophil %  0.3 - 6.2 % 2.6 3.2 1.8 3.4 2.3     Basophil %  0.0 - 1.5 % 1.3 0.4 0.3 0.6 0.3     Immature Grans %  0.0 - 0.5 % 3.8 High                Current Facility-Administered Medications   Medication Dose Route Frequency Provider Last Rate Last Admin    acetaminophen (TYLENOL) tablet 650 mg  650 mg Oral Q4H PRN Ashtyn Beckman APRN        Or    acetaminophen (TYLENOL) 160 MG/5ML oral solution 650 mg  650 mg Oral Q4H PRN Ashtyn Beckman APRN        Or    acetaminophen (TYLENOL) suppository 650 mg  650 mg Rectal Q4H PRN Ashtyn Beckman APRN        albuterol (PROVENTIL) nebulizer solution 0.083% 2.5 mg/3mL  2.5 mg Nebulization Q6H PRN Ashtyn Beckman APRN        apixaban (ELIQUIS) tablet 5 mg  5 mg Oral BID Rk  TEENA Drummond   5 mg at 05/21/24 0004    aspirin chewable tablet 81 mg  81 mg Oral Daily Ashtyn Beckman APRN        sennosides-docusate (PERICOLACE) 8.6-50 MG per tablet 2 tablet  2 tablet Oral BID PRN Ashtyn Beckman APRN        And    polyethylene glycol (MIRALAX) packet 17 g  17 g Oral Daily PRN Ashtyn Beckman APRN        And    bisacodyl (DULCOLAX) EC tablet 5 mg  5 mg Oral Daily PRN Ashtyn Beckman APRN        And    bisacodyl (DULCOLAX) suppository 10 mg  10 mg Rectal Daily PRN Ashtyn Beckman APRBETO        gabapentin (NEURONTIN) capsule 300 mg  300 mg Oral Nightly Ashtyn Beckman APRN   300 mg at 05/21/24 0004    insulin glargine (LANTUS, SEMGLEE) injection 50 Units  50 Units Subcutaneous Q12H Ashtyn Beckman APRN   50 Units at 05/21/24 0005    isosorbide mononitrate (IMDUR) 24 hr tablet 120 mg  120 mg Oral Daily Ashtyn Beckman APRN        lamoTRIgine (LaMICtal) tablet 200 mg  200 mg Oral BID Ashtyn Beckman APRN   200 mg at 05/21/24 0006    levETIRAcetam (KEPPRA) tablet 1,500 mg  1,500 mg Oral Daily Ashtyn Beckman APRN        levETIRAcetam (KEPPRA) tablet 2,000 mg  2,000 mg Oral Nightly Ashtyn Beckman APRN   2,000 mg at 05/21/24 0006    metoprolol tartrate (LOPRESSOR) tablet 100 mg  100 mg Oral BID Ashtyn Beckman APRN   100 mg at 05/21/24 0005    nitroglycerin (NITROSTAT) SL tablet 0.4 mg  0.4 mg Sublingual Q5 Min PRN Ashtyn Beckman APRN        ondansetron ODT (ZOFRAN-ODT) disintegrating tablet 4 mg  4 mg Oral Q6H PRN Ashtyn Beckman APRN        Or    ondansetron (ZOFRAN) injection 4 mg  4 mg Intravenous Q6H PRN Ashtyn Beckman APRN        pantoprazole (PROTONIX) EC tablet 40 mg  40 mg Oral BID AC Ashtyn Beckman APRN        rosuvastatin (CRESTOR) tablet 20 mg  20 mg Oral Nightly Ashtyn Beckman APRN   20 mg at 05/21/24 0005    sacubitril-valsartan (ENTRESTO) 49-51 MG tablet 2 tablet  2 tablet Oral Q12H Ashtyn Beckman APRN   2 tablet at 05/21/24  0014    sodium chloride 0.9 % flush 10 mL  10 mL Intravenous PRN Emergency, Triage Protocol, MD        sodium chloride 0.9 % flush 10 mL  10 mL Intravenous Q12H Ashtyn Beckman APRN   10 mL at 05/21/24 0014    sodium chloride 0.9 % flush 10 mL  10 mL Intravenous PRN Ashtyn Beckman APRN        sodium chloride 0.9 % infusion 40 mL  40 mL Intravenous PRN Ashtyn Beckman APRN        spironolactone (ALDACTONE) tablet 50 mg  50 mg Oral Daily Ashtyn Beckman APRN        tamsulosin (FLOMAX) 24 hr capsule 0.4 mg  0.4 mg Oral Daily Ashtyn Beckman APRN         Current Outpatient Medications   Medication Sig Dispense Refill    acetaminophen (TYLENOL) 325 MG tablet Take 1 tablet by mouth Every 6 (Six) Hours As Needed for Mild Pain.      albuterol (PROVENTIL HFA;VENTOLIN HFA) 108 (90 BASE) MCG/ACT inhaler Inhale 2 puffs Every 6 (Six) Hours As Needed for Wheezing.      apixaban (ELIQUIS) 5 MG tablet tablet Take 1 tablet by mouth 2 (Two) Times a Day.      Aspirin 81 MG capsule Take 81 mg by mouth Daily. Dr. Ramey or Dr. Tang      calcium polycarbophil (FIBERCON) 625 MG tablet Take 1 tablet by mouth Daily As Needed.      carboxymethylcellulose (REFRESH PLUS) 0.5 % solution Administer 1 drop to both eyes 4 (Four) Times a Day As Needed for Dry Eyes.      empagliflozin (JARDIANCE) 10 MG tablet tablet Take 1 tablet by mouth Daily.      ezetimibe (ZETIA) 10 MG tablet Take 1 tablet by mouth Daily.      fluticasone (FLONASE) 50 MCG/ACT nasal spray 2 sprays into the nostril(s) as directed by provider Daily As Needed for Rhinitis or Allergies.      furosemide (Lasix) 40 MG tablet Take 1 tablet by mouth Daily As Needed (take as needed for weight gain more than 2 pounds in a day or more than than 3 pounds in 2 days.). 90 tablet 1    gabapentin (NEURONTIN) 300 MG capsule Take 1 capsule by mouth Every Night.      guaiFENesin (MUCINEX) 600 MG 12 hr tablet Take 2 tablets by mouth Daily As Needed for Congestion.      insulin aspart  (novoLOG FLEXPEN) 100 UNIT/ML solution pen-injector sc pen Inject 50 Units under the skin into the appropriate area as directed 3 (Three) Times a Day With Meals. Can use 5 to 6 more units if needed in early evening.      INSULIN GLARGINE-YFGN SC Inject 100 Units under the skin into the appropriate area as directed Every Night.      INSULIN GLARGINE-YFGN SC Inject 80 Units under the skin into the appropriate area as directed Daily.      isosorbide mononitrate (IMDUR) 120 MG 24 hr tablet Take 1 tablet by mouth Daily. 90 tablet 3    lamoTRIgine (LaMICtal) 200 MG tablet Take 1 tablet by mouth 2 (Two) Times a Day. 180 tablet 1    levETIRAcetam (KEPPRA) 500 MG tablet Take 3 tablets by mouth Every Morning. 90 tablet 5    levETIRAcetam (KEPPRA) 500 MG tablet Take 4 tablets by mouth Every Night. 120 tablet 5    metFORMIN (GLUCOPHAGE) 1000 MG tablet Take 1 tablet by mouth 2 (Two) Times a Day With Meals.      metoprolol tartrate (LOPRESSOR) 100 MG tablet Take 1 tablet by mouth 2 (Two) Times a Day. Told to take the DOS-2/27/23, w/ sip of water.      Multiple Vitamin (MULTI VITAMIN PO) Take 1 tablet by mouth Daily.      nitroglycerin (NITROSTAT) 0.4 MG SL tablet Place 1 tablet under the tongue Every 5 (Five) Minutes As Needed for Chest Pain. Take no more than 3 doses in 15 minutes. 25 tablet 2    pantoprazole (PROTONIX) 40 MG EC tablet Take 1 tablet by mouth 2 (Two) Times a Day.      polyethylene glycol (MIRALAX) 17 GM/SCOOP powder Take two scoops twice a day until bowel movements normalize 578 g 0    psyllium (METAMUCIL) 58.6 % packet Take 1 packet by mouth Daily As Needed (constipation).      Rimegepant Sulfate (Nurtec) 75 MG tablet dispersible tablet Take 1 tablet by mouth As Needed (Migraine). 8 tablet 5    rosuvastatin (CRESTOR) 20 MG tablet Take 1 tablet by mouth Every Night.      sacubitril-valsartan (Entresto)  MG tablet Take 1 tablet by mouth 2 (Two) Times a Day.      Semaglutide,0.25 or 0.5MG/DOS, (Ozempic, 0.25  or 0.5 MG/DOSE,) 2 MG/1.5ML solution pen-injector Inject  under the skin into the appropriate area as directed 1 (One) Time Per Week.      spironolactone (ALDACTONE) 50 MG tablet Take 1 tablet by mouth Daily.      tamsulosin (FLOMAX) 0.4 MG capsule 24 hr capsule Take 1 capsule by mouth Daily.

## 2024-05-21 NOTE — DISCHARGE SUMMARY
[facility] Medicine Services  DISCHARGE SUMMARY       Date of Admission: 5/20/2024  Date of Discharge:  5/21/2024  Primary Care Physician: Vinayak Correia PA    Discharge Diagnoses:  Active Hospital Problems    Diagnosis     Chest pain     Presence of CardioMEMS HF system     Chronic anticoagulation     S/P CABG x 2     Chronic diastolic congestive heart failure     Paroxysmal atrial fibrillation     Class 1 obesity due to excess calories with serious comorbidity and body mass index (BMI) of 34.0 to 34.9 in adult     Coronary artery disease involving native coronary artery of native heart without angina pectoris     Type 2 diabetes mellitus with circulatory disorder, with long-term current use of insulin          Presenting Problem/History of Present Illness:  Chest pain [R07.9]     Chief Complaint on Day of Discharge:   No complaint    History of Present Illness on Day of Discharge:   The patient appears to be doing well today.  Stress test is a low risk for ischemia.  The patient is appropriate for discharge today and is to follow-up with his PCP next week.    Hospital Course  Carlos A BoyerJr. is a 64-year-old male with a past medical history of coronary artery disease with stents, MI, insulin-dependent diabetes-poorly controlled, bilateral carotid artery disease, nonhealing diabetic wound right foot followed by [facility], paroxysmal atrial fibrillation on Eliquis, please see below for complete list. [facility] when after walking and becoming hot he developed left-sided chest pain that radiated into the neck. He did not report chest pain to the providers at that facility. He states once he cooled off and rested it went away. He did not take his nitroglycerin because he forgot about them. He states after he walked out into the parking lot and drove home he seemed to be doing okay until he arrived at home and again while walking in  "the heat he developed same type of pain. He has no associated shortness of breathing or nausea. He states he did have nausea after arrival to the ED, not associated with the chest pain. He has had none since arrival. Troponins are negative. Patient has a specialty shoe right foot, wife states she changes dressing daily. We will consult wound nurse. Patient will not be able to ambulate for stress test. He has no other complaints. He is admitted for further evaluation and treatment.   Treatment Plan  1.  The patient will be admitted to Dr. Boone's service here at [facility].   2.  Lexiscan in a.m.  3.  N.p.o. after midnight, oral care  4.  Home medications reviewed and restarted as appropriate  5.  DVT prophylaxis with ongoing Eliquis use  6.  Supplemental oxygen, incentive spirometry, continuous pulse oximetry, daily weights  7.  Labs in a.m.  8.  Consult wound care-followed by [facility] clinic      Result Review    Result Review:  I have personally reviewed the results from the time of this admission to 5/21/2024 14:25 CDT and agree with these findings:  []  Laboratory  []  Microbiology  []  Radiology  []  EKG/Telemetry   []  Cardiology/Vascular   []  Pathology  []  Old records  []  Other:    Condition on Discharge:    Stable and improved without chest pain    Physical Exam on Discharge:  /78 (BP Location: Right arm, Patient Position: Sitting)   Pulse 54   Temp 98.3 °F (36.8 °C) (Oral)   Resp 18   Ht 182.9 cm (72.01\")   Wt 117 kg (257 lb 15 oz)   SpO2 94%   BMI 34.97 kg/m²   Physical Exam       Constitutional:       Appearance: Normal appearance.   HENT:      Head: Normocephalic and atraumatic.      Mouth/Throat:      Mouth: Mucous membranes are moist.      Pharynx: Oropharynx is clear.      Comments: Poor dentition  Eyes:      Extraocular Movements: Extraocular movements intact.      Conjunctiva/sclera: Conjunctivae normal.   Cardiovascular:      Rate and Rhythm: Normal rate and regular " rhythm.   Pulmonary:      Effort: Pulmonary effort is normal.      Breath sounds: Normal breath sounds.   Abdominal:      General: Abdomen is protuberant.      Palpations: Abdomen is soft.   Musculoskeletal:      Cervical back: Normal range of motion and neck supple.      Right lower leg: No edema.      Left lower leg: No edema.      Comments: Generalized weakness and debility   Skin:     General: Skin is warm and dry.      Comments: Nonhealing wound right foot   Neurological:      General: No focal deficit present.      Mental Status: He is alert and oriented to person, place, and time.   Psychiatric:         Mood and Affect: Mood normal.         Behavior: Behavior norm    Discharge Disposition:  Home or Self Care    Discharge Medications:     Discharge Medications        Continue These Medications        Instructions Start Date   acetaminophen 325 MG tablet  Commonly known as: TYLENOL   325 mg, Oral, Every 6 Hours PRN      albuterol sulfate  (90 Base) MCG/ACT inhaler  Commonly known as: PROVENTIL HFA;VENTOLIN HFA;PROAIR HFA   2 puffs, Inhalation, Every 6 Hours PRN      apixaban 5 MG tablet tablet  Commonly known as: ELIQUIS   5 mg, Oral, 2 Times Daily      Aspirin 81 MG capsule   81 mg, Oral, Daily, Dr. Ramey or Dr. Tang      carboxymethylcellulose 0.5 % solution  Commonly known as: REFRESH PLUS   1 drop, Both Eyes, 4 Times Daily PRN      empagliflozin 10 MG tablet tablet  Commonly known as: JARDIANCE   10 mg, Oral, Daily      Entresto  MG tablet  Generic drug: sacubitril-valsartan   1 tablet, Oral, 2 Times Daily      ezetimibe 10 MG tablet  Commonly known as: ZETIA   10 mg, Oral, Daily      fluticasone 50 MCG/ACT nasal spray  Commonly known as: FLONASE   2 sprays, Nasal, Daily      furosemide 40 MG tablet  Commonly known as: Lasix   40 mg, Oral, Daily PRN      gabapentin 300 MG capsule  Commonly known as: NEURONTIN   300 mg, Oral, Nightly      guaiFENesin 600 MG 12 hr tablet  Commonly known as:  MUCINEX   1,200 mg, Oral, Daily PRN      insulin aspart 100 UNIT/ML solution pen-injector sc pen  Commonly known as: novoLOG FLEXPEN   40 Units, Subcutaneous, 3 Times Daily With Meals, Can use 5 to 6 more units if needed in early evening.      INSULIN GLARGINE-YFGN SC   100 Units, Subcutaneous, Nightly      INSULIN GLARGINE-YFGN SC   80 Units, Subcutaneous, Daily      isosorbide mononitrate 120 MG 24 hr tablet  Commonly known as: IMDUR   120 mg, Oral, Daily      lamoTRIgine 200 MG tablet  Commonly known as: LaMICtal   200 mg, Oral, 2 Times Daily      levETIRAcetam 500 MG tablet  Commonly known as: KEPPRA   1,500 mg, Oral, Every Morning      levETIRAcetam 500 MG tablet  Commonly known as: KEPPRA   2,000 mg, Oral, Nightly      metFORMIN 1000 MG tablet  Commonly known as: GLUCOPHAGE   1,000 mg, Oral, 2 Times Daily With Meals      metoprolol tartrate 100 MG tablet  Commonly known as: LOPRESSOR   100 mg, Oral, 2 Times Daily      multivitamin tablet tablet  Commonly known as: THERAGRAN   1 tablet, Oral, Daily      nitroglycerin 0.4 MG SL tablet  Commonly known as: NITROSTAT   0.4 mg, Sublingual, Every 5 Minutes PRN, Take no more than 3 doses in 15 minutes.      Nurtec 75 MG dispersible tablet  Generic drug: Rimegepant Sulfate   1 tablet, Oral, 3 Times Daily PRN      Ozempic (0.25 or 0.5 MG/DOSE) 2 MG/1.5ML solution pen-injector  Generic drug: Semaglutide(0.25 or 0.5MG/DOS)   0.5 mg, Subcutaneous, Weekly, Monday      pantoprazole 40 MG EC tablet  Commonly known as: PROTONIX   40 mg, Oral, 2 Times Daily      polycarbophil 625 MG tablet tablet   625 mg, Oral, Daily PRN      polyethylene glycol 17 GM/SCOOP powder  Commonly known as: MIRALAX   Take two scoops twice a day until bowel movements normalize      psyllium 58.6 % packet  Commonly known as: METAMUCIL   1 packet, Oral, Daily PRN      rosuvastatin 40 MG tablet  Commonly known as: CRESTOR   20 mg, Oral, Nightly      sulfamethoxazole-trimethoprim 400-80 MG  tablet  Commonly known as: BACTRIM,SEPTRA   1 tablet, Oral, 2 Times Daily      tamsulosin 0.4 MG capsule 24 hr capsule  Commonly known as: FLOMAX   1 capsule, Oral, Daily             ASK your doctor about these medications        Instructions Start Date   spironolactone 50 MG tablet  Commonly known as: ALDACTONE   50 mg, Daily               Discharge Diet:   Diet Instructions       Diet: Diabetic Diets; Consistent Carbohydrate; Thin (IDDSI 0)      Discharge Diet: Diabetic Diets    Diabetic Diet: Consistent Carbohydrate    Fluid Consistency: Thin (IDDSI 0)            Discharge Care Plan / Instructions:   Discharge home    Activity at Discharge:   Activity Instructions       Activity as Tolerated              Follow-up Appointments:  Follow-up with PCP next week  Follow-up with wound care as scheduled    Electronically signed by Willard Montana DO, 05/21/24, 14:25 CDT.    Time: Discharge less than 30 min    Part of this note may be an electronic transcription/translation of spoken language to printed text using the Dragon Dictation system.

## 2024-05-21 NOTE — ED PROVIDER NOTES
Subjective   History of Present Illness  Patient is 60-year-old who was at the [facility] and had some chest pain and shortness of breath who was in the ED because in the past she has had coronary disease and also got a history aortic aneurysm.  The chest pain was pressure-like sensation did not radiate anywhere else.  Were not sharp stabbing characteristic was associate with diaphoresis and some shortness of breath.  No fever    Chest Pain  Pain location:  Substernal area  Pain quality: aching and pressure    Pain radiates to:  Does not radiate  Pain severity:  Moderate  Onset quality:  Sudden  Progression:  Resolved  Chronicity:  New  Context: not breathing, not drug use and not eating    Relieved by:  Nothing  Worsened by:  Nothing  Ineffective treatments:  None tried  Associated symptoms: shortness of breath    Associated symptoms: no abdominal pain, no AICD problem, no anorexia, no anxiety, no back pain, no fever, no headache, no heartburn, no nausea, no numbness, no orthopnea, no palpitations and no weakness    Risk factors: coronary artery disease, diabetes mellitus, high cholesterol, hypertension and male sex    Risk factors: no aortic disease, no immobilization, no Marfan's syndrome, no prior DVT/PE, no smoking and no surgery    Shortness of Breath  Associated symptoms: chest pain    Associated symptoms: no abdominal pain, no fever, no headaches and no neck pain        Review of Systems   Constitutional: Negative.  Negative for fever.   HENT: Negative.     Respiratory:  Positive for shortness of breath.    Cardiovascular:  Positive for chest pain. Negative for palpitations and orthopnea.   Gastrointestinal: Negative.  Negative for abdominal distention, abdominal pain, anorexia, heartburn and nausea.   Endocrine: Negative.    Genitourinary: Negative.    Musculoskeletal: Negative.  Negative for back pain and neck pain.   Skin:  Negative for color change and pallor.   Neurological: Negative.  Negative for  syncope, weakness, light-headedness, numbness and headaches.   Hematological: Negative.  Does not bruise/bleed easily.   All other systems reviewed and are negative.      Past Medical History:   Diagnosis Date    Arthritis     Asthma     Cancer     skin    Carotid disease, bilateral     Cellulitis     right foot - on antibiotics 6-    Chest pain     Chronic diastolic congestive heart failure 09/07/2019    Chronic sinusitis     Maribell bullosa     Coronary artery disease involving native coronary artery of native heart with unstable angina pectoris 01/17/2017    Deviated septum     Diabetes mellitus     Difficulty urinating     Diverticulitis     Enlarged prostate     Fatty liver     GERD (gastroesophageal reflux disease)     [place] (hard of hearing)     Does have hearing aids    Hyperlipidemia LDL goal <70 02/02/2017    Hypertrophy of nasal turbinates     Keratoderma     Kidney stone     Lung nodules     lower right lung    Migraine     Murmur, heart     Myocardial infarction     Obesity     Paroxysmal atrial fibrillation 07/11/2019    Personal history of COVID-19 07/2021    PONV (postoperative nausea and vomiting)     Primary hypertension 10/16/2016    Psoriasis     Seizures     Sinus congestion     Skin cancer     Sleep apnea     not using cpap    SOB (shortness of breath)     Stroke     mild weakness on right side    UTI (urinary tract infection)        Allergies   Allergen Reactions    Flagyl [Metronidazole] Hives    Atorvastatin Other (See Comments) and Myalgia      - LIPITOR -   Muscle cramps    Ciprofloxacin Hives      - CIPRO -        Past Surgical History:   Procedure Laterality Date    CARDIAC CATHETERIZATION  01/2016    Dr. Broadbent; widely patent previously placed stents in the left anterior descending and obstructive disease involving the diagonal branch which was treated medically    CARDIAC CATHETERIZATION N/A 07/14/2017    Procedure: Left Heart Cath;  Surgeon: Wade Ramey MD;  Location: [facility]  CATH INVASIVE LOCATION;  Service:     CARDIAC CATHETERIZATION Left 10/15/2018    Procedure: Cardiac Catheterization/Vascular Study;  Surgeon: Wade Ramey MD;  Location: [facility] CATH INVASIVE LOCATION;  Service: Cardiology    CARDIAC CATHETERIZATION  10/15/2018    Procedure: Functional Flow Max;  Surgeon: Wade Ramey MD;  Location:  [facility] CATH INVASIVE LOCATION;  Service: Cardiology    CARDIAC CATHETERIZATION N/A 10/15/2018    Procedure: Left ventriculography;  Surgeon: Wade Ramey MD;  Location: [facility] CATH INVASIVE LOCATION;  Service: Cardiology    CARDIAC CATHETERIZATION Left 06/26/2019    Procedure: Cardiac Catheterization/Vascular Study VEL OK  HE WILL WAIT 1 YEAR FOR SHOULDER SURGERY ;  Surgeon: Wade Ramey MD;  Location: [facility] CATH INVASIVE LOCATION;  Service: Cardiology    CARDIAC CATHETERIZATION Left 04/30/2021    Procedure: Coronary angiography;  Surgeon: Sahil Llamas MD;  Location: [facility] CATH INVASIVE LOCATION;  Service: Cardiology;  Laterality: Left;    CARDIAC CATHETERIZATION N/A 04/30/2021    Procedure: Percutaneous Coronary Intervention;  Surgeon: Sahil Llamas MD;  Location: [facility] CATH INVASIVE LOCATION;  Service: Cardiology;  Laterality: N/A;    CARDIAC CATHETERIZATION N/A 11/09/2022    Procedure: Left Heart Cath with SVGs;  Surgeon: Wade Ramey MD;  Location: [facility] CATH INVASIVE LOCATION;  Service: Cardiology;  Laterality: N/A;    CARPAL TUNNEL RELEASE      January 2024 and pther hand February 2024    CHOLECYSTECTOMY      CHOLECYSTECTOMY WITH INTRAOPERATIVE CHOLANGIOGRAM N/A 08/01/2018    Procedure: CHOLECYSTECTOMY LAPAROSCOPIC INTRAOPERATIVE CHOLANGIOGRAM;  Surgeon: Shane Ann MD;  Location: [facility] OR;  Service: General    COLONOSCOPY N/A 07/14/2020    Procedure: COLONOSCOPY WITH ANESTHESIA;  Surgeon: Anupam Morales DO;  Location: [facility] ENDOSCOPY;  Service: Gastroenterology;  Laterality: N/A;  pre: abdominal pain  post: diverticulosis  Vinayak Correia PA     CORONARY ANGIOPLASTY      CORONARY ARTERY BYPASS GRAFT N/A 07/06/2019    Procedure: CABG X2 WITH LIMA, LEFT LEG OVH, AND PLACEMENT OF LEFT FEMORAL ARTERIAL LINE;  Surgeon: Steven Tang MD;  Location:  [facility] OR;  Service: Cardiothoracic    CORONARY STENT PLACEMENT      x 6    CYSTOSCOPY TRANSURETHRAL RESECTION OF PROSTATE N/A 01/23/2023    Procedure: CYSTOSCOPY TRANSURETHRAL RESECTION OF PROSTATE;  Surgeon: Latrell Pope MD;  Location:  [facility] OR;  Service: Urology;  Laterality: N/A;    ENDOSCOPIC FUNCTIONAL SINUS SURGERY (FESS) Bilateral 12/13/2017    Procedure: PROCEDURE PERFORMED:  Bilateral functional endoscopic anterior ethmoidectomy with bilateral middle meatal antrostomy Septoplasty Right kathia bullosa resection Bilateral inferior turbinate reduction via Coblation;  Surgeon: Mayank Ibarra MD;  Location:  [facility] OR;  Service:     ENDOSCOPY N/A 07/30/2018    Procedure: ESOPHAGOGASTRODUODENOSCOPY WITH ANESTHESIA;  Surgeon: Benitez Mas MD;  Location: [facility] ENDOSCOPY;  Service: Gastroenterology    ENDOSCOPY N/A 07/14/2020    Procedure: ESOPHAGOGASTRODUODENOSCOPY WITH ANESTHESIA;  Surgeon: Anupam Morales DO;  Location: [facility] ENDOSCOPY;  Service: Gastroenterology;  Laterality: N/A;  pre: abdominal pain  post: esophagitis  Vinayak Correia, PA    HERNIA REPAIR      x2 inguinal area    KIDNEY STONE SURGERY      KNEE ARTHROSCOPY Right 03/01/2022    Procedure: RIGHT KNEE PARTIAL LATERAL MENISCECTOMY;  Surgeon: Pedro Pablo Song MD;  Location:  [facility] OR;  Service: Orthopedics;  Laterality: Right;    KNEE SURGERY Right     OTHER SURGICAL HISTORY      urolift    PROSTATE SURGERY      Dr. Badillo - 2017    PULMONARY ARTERY PRESSURE SENSOR IMPLANT N/A 05/08/2023    Procedure: PA Pressure Sensor Implant;  Surgeon: Wade Ramey MD;  Location: [facility] CATH INVASIVE LOCATION;  Service: Cardiology;  Laterality: N/A;    ROTATOR CUFF REPAIR Right     SLEEP ENDOSCOPY N/A 02/27/2023    Procedure:  Videosleep endoscopy;  Surgeon: Mayank Ibarra MD;  Location: [facility] OR;  Service: ENT;  Laterality: N/A;    THUMB AMPUTATION Left     partial    TOE SURGERY      great toe       Family History   Problem Relation Age of Onset    Heart disease Father     COPD Mother     Hypertension Mother     Asthma Mother     No Known Problems Sister     Colon cancer Paternal Uncle     Prostate cancer Maternal Grandfather     No Known Problems Sister     Colon cancer Maternal Grandmother     Colon polyps Maternal Grandmother        Social History     Socioeconomic History    Marital status:    Tobacco Use    Smoking status: Former     Current packs/day: 0.00     Average packs/day: 1.5 packs/day for 18.0 years (27.0 ttl pk-yrs)     Types: Cigarettes, Cigars     Start date:      Quit date:      Years since quittin.4     Passive exposure: Past    Smokeless tobacco: Former     Types: Chew     Quit date:    Vaping Use    Vaping status: Never Used   Substance and Sexual Activity    Alcohol use: No     Comment: 0    Drug use: No    Sexual activity: Defer           Objective   Physical Exam  Vitals and nursing note reviewed. Exam conducted with a chaperone present.   Constitutional:       General: He is not in acute distress.     Appearance: Normal appearance. He is well-developed. He is not toxic-appearing.   HENT:      Head: Normocephalic and atraumatic.      Nose: Nose normal.      Mouth/Throat:      Mouth: Mucous membranes are moist.      Pharynx: Uvula midline.   Eyes:      General: Lids are normal. Lids are everted, no foreign bodies appreciated.      Conjunctiva/sclera: Conjunctivae normal.      Pupils: Pupils are equal, round, and reactive to light.   Neck:      Vascular: Normal carotid pulses. No carotid bruit or JVD.      Trachea: Trachea and phonation normal. No tracheal deviation.   Cardiovascular:      Rate and Rhythm: Normal rate and regular rhythm.      Chest Wall: PMI is not displaced.       Pulses: Normal pulses.      Heart sounds: Normal heart sounds.      No diastolic murmur is present.      No gallop.   Pulmonary:      Effort: Pulmonary effort is normal. No tachypnea, accessory muscle usage or respiratory distress.      Breath sounds: Normal breath sounds. No stridor. No decreased breath sounds, wheezing, rhonchi or rales.   Abdominal:      General: Bowel sounds are normal. There is no distension.      Palpations: Abdomen is soft.      Tenderness: There is no abdominal tenderness.   Musculoskeletal:         General: No swelling. Normal range of motion.      Cervical back: Full passive range of motion without pain, normal range of motion and neck supple. No rigidity.      Right lower leg: No tenderness. No edema.      Left lower leg: No tenderness. No edema.      Comments: Lower extremity exam bilaterally is unremarkable.  There is no right or left calf tenderness .  There is no palpable venous cord.  No obvious difference in the size of the legs.  No pitting edema.  The dorsalis pedis and posterior tibial femoral and popliteal pulses are palpable and +2 bilaterally.  Homans sign is negative   Skin:     General: Skin is warm and dry.      Capillary Refill: Capillary refill takes less than 2 seconds.      Coloration: Skin is not jaundiced or pale.      Nails: There is no clubbing.   Neurological:      General: No focal deficit present.      Mental Status: He is alert and oriented to person, place, and time.      GCS: GCS eye subscore is 4. GCS verbal subscore is 5. GCS motor subscore is 6.      Cranial Nerves: No cranial nerve deficit.      Motor: Motor function is intact.      Gait: Gait normal.      Deep Tendon Reflexes: Reflexes are normal and symmetric. Reflexes normal.   Psychiatric:         Speech: Speech normal.         Behavior: Behavior normal.         Procedures           ED Course  ED Course as of 05/20/24 2210   Mon May 20, 2024   2102 [place] for PE 0  Years Algorithm for Pulmonary  Embolus  To be used in hemodynamically stable patient >18 years old    No signs of DVT are present, there is no hemoptysis, PE is not the most likely diagnosis and the D-dimer is not greater or equal to 1000 ng/mL. Therefore PE is excluded . The Years algorithm rules out PE (0.43 % with symptomatic VTE during 3-month follow-up)   [TS]   2156 Coronary angiography:  Mild stenosis of left main coronary artery without obstructive disease  Left anterior descending coronary artery has multiple stents in the proximal and midportion after which the vessel is occluded  No obstructive disease of diagonal branch  No significant disease of main trunk of left circumflex coronary artery  First obtuse marginal branch has 50% stenosis with patent bypass graft  Second and third obtuse marginal branch does not have any obstructive disease  Patent saphenous venous graft to the obtuse marginal branch  Patent left internal mammary artery graft to the left anterior descending coronary artery  Right coronary artery has mild atherosclerotic changes in the proximal and midportion without obstructive  Discussed the cath in September 2022 [TS]   2207 Patient's workup essentially negative dimer is negative and the troponins are negative with a baseline renal insufficiency.  I have discussed this case at length with the patient the last cardiac catheterization was in 2022 he has not had a stress test since then as far as I know.  The patient was offered an outpatient evaluation assessment but he wants to stay in the hospital and get a stress test as an inpatient.  I have discussed this case with the hospitalist agreeable and admitting the patient to observation for stress testing. [TS]      ED Course User Index  [TS] Nj Guidry MD                HEART Score: 6                              Medical Decision Making  Differential Diagnosis:  I considered chest wall pain, muscle strain, costochondritis, pleurisy, rib fracture, herpes zoster,  cardiovascular etiology, myocardial infarction, intermediate coronary syndrome, unstable angina, angina, aortic dissection, pericarditis, pulmonary etiology, pulmonary embolism, pneumonia, pneumothorax, lung cancer, gastroesophageal reflux disease, esophagitis, esophageal spasm and gastrointestinal etiology as a possible cause of chest pain in this patient. This is a partial list of diagnoses considered.        Problems Addressed:  Chest pain, unspecified type: acute illness or injury     Details: Chest pain not have any pain at this time.  EKG nonspecific findings.  Heart score is elevated.  I have discussed this case of the patient he does not want to have an outpatient evaluation wants an inpatient stress testing.  Dyspnea, unspecified type: acute illness or injury     Details: No evidence of PE no pneumonia no CHF.    Amount and/or Complexity of Data Reviewed  Labs:      Details: Chronic renal insufficiency with mild RTA.  Discussion of management or test interpretation with external provider(s): Discussed with the hospitalist    Risk  Decision regarding hospitalization.  Risk Details: Well score is 0  Heart score is moderate.  Years Algorithm for Pulmonary Embolus  To be used in hemodynamically stable patient >18 years old    No signs of DVT are present, there is no hemoptysis, PE is not the most likely diagnosis and the D-dimer is not greater or equal to 1000 ng/mL. Therefore PE is excluded . The Years algorithm rules out PE (0.43 % with symptomatic VTE during 3-month follow-up)    Will be admitted for stress test.        Final diagnoses:   Chest pain, unspecified type   Dyspnea, unspecified type       ED Disposition  ED Disposition       ED Disposition   Decision to Admit    Condition   --    Comment   Level of Care: Telemetry [5]   Diagnosis: Chest pain [768505]   Admitting Physician: ELGIN UREÑA [[id number]]   Attending Physician: ELGIN UREÑA [[id number]]                 No follow-up provider  specified.       Medication List      No changes were made to your prescriptions during this visit.            Nj Guidry MD  05/20/24 2045       Nj Guidry MD  05/20/24 2217

## 2024-05-21 NOTE — CONSULTS
[facility]  INPATIENT WOUND & OSTOMY CONSULTATION    Today's Date: 05/21/24    Patient Name: Carlos A Boyer Jr.  MRN: [id number]  CSN: [id number]  PCP: Vinayak Correia PA  Referring Provider:   Consulting Provider (From admission, onward)      Start Ordered     Status Ordering Provider    05/20/24 2225 05/20/24 2227  Inpatient Wound Care MD Consult  Once        Specialty:  Wound Care  Provider:  (Not yet assigned)    Acknowledged SOLEDAD KANG           Attending Provider: Willard Montana DO  Length of Stay: 0    SUBJECTIVE   Chief Complaint: Right hallux wound    HPI: Carlos A Boyer Jr., a 64 y.o.male, presents with a past medical history of ***.  A full past medical history as listed below.  Inpatient wound care consulted due to ***      Visit Dx:    ICD-10-CM ICD-9-CM   1. Chest pain, unspecified type  R07.9 786.50   2. Dyspnea, unspecified type  R06.00 786.09       Hospital Problem List:     Type 2 diabetes mellitus with circulatory disorder, with long-term current use of insulin    Coronary artery disease involving native coronary artery of native heart without angina pectoris    Class 1 obesity due to excess calories with serious comorbidity and body mass index (BMI) of 34.0 to 34.9 in adult    Paroxysmal atrial fibrillation    Chronic diastolic congestive heart failure    S/P CABG x 2    Chronic anticoagulation    Presence of CardioMEMS HF system    Chest pain      History:   Past Medical History:   Diagnosis Date    Arthritis     Asthma     Cancer     skin    Carotid disease, bilateral     Cellulitis     right foot - on antibiotics 6-    Chest pain     Chronic diastolic congestive heart failure 09/07/2019    Chronic sinusitis     Maribell bullosa     Coronary artery disease involving native coronary artery of native heart with unstable angina pectoris 01/17/2017    Deviated septum     Diabetes mellitus     Difficulty urinating     Diverticulitis     Enlarged prostate      Fatty liver     GERD (gastroesophageal reflux disease)     [place] (hard of hearing)     Does have hearing aids    Hyperlipidemia LDL goal <70 02/02/2017    Hypertrophy of nasal turbinates     Keratoderma     Kidney stone     Lung nodules     lower right lung    Migraine     Murmur, heart     Myocardial infarction     Obesity     Paroxysmal atrial fibrillation 07/11/2019    Personal history of COVID-19 07/2021    PONV (postoperative nausea and vomiting)     Primary hypertension 10/16/2016    Psoriasis     Seizures     Sinus congestion     Skin cancer     Sleep apnea     not using cpap    SOB (shortness of breath)     Stroke     mild weakness on right side    UTI (urinary tract infection)      Past Surgical History:   Procedure Laterality Date    CARDIAC CATHETERIZATION  01/2016    Dr. Broadbent; widely patent previously placed stents in the left anterior descending and obstructive disease involving the diagonal branch which was treated medically    CARDIAC CATHETERIZATION N/A 07/14/2017    Procedure: Left Heart Cath;  Surgeon: Wade Ramey MD;  Location:  [facility];  Service:     CARDIAC CATHETERIZATION Left 10/15/2018    Procedure: Cardiac Catheterization/Vascular Study;  Surgeon: Wade Ramey MD;  Location:  [facility];  Service: Cardiology    CARDIAC CATHETERIZATION  10/15/2018    Procedure: Functional Flow Udall;  Surgeon: Wade Ramey MD;  Location:  [facility];  Service: Cardiology    CARDIAC CATHETERIZATION N/A 10/15/2018    Procedure: Left ventriculography;  Surgeon: Wade Ramey MD;  Location:  [facility];  Service: Cardiology    CARDIAC CATHETERIZATION Left 06/26/2019    Procedure: Cardiac Catheterization/Vascular Study VEL OK  HE WILL WAIT 1 YEAR FOR SHOULDER SURGERY ;  Surgeon: Wade Ramey MD;  Location:  [facility];  Service: Cardiology    CARDIAC CATHETERIZATION Left 04/30/2021    Procedure: Coronary  angiography;  Surgeon: Sahil Llamas MD;  Location:  [facility];  Service: Cardiology;  Laterality: Left;    CARDIAC CATHETERIZATION N/A 04/30/2021    Procedure: Percutaneous Coronary Intervention;  Surgeon: Sahil Llamas MD;  Location:  [facility];  Service: Cardiology;  Laterality: N/A;    CARDIAC CATHETERIZATION N/A 11/09/2022    Procedure: Left Heart Cath with SVGs;  Surgeon: Wade Ramey MD;  Location:  [facility];  Service: Cardiology;  Laterality: N/A;    CARPAL TUNNEL RELEASE      January 2024 and pther hand February 2024    CHOLECYSTECTOMY      CHOLECYSTECTOMY WITH INTRAOPERATIVE CHOLANGIOGRAM N/A 08/01/2018    Procedure: CHOLECYSTECTOMY LAPAROSCOPIC INTRAOPERATIVE CHOLANGIOGRAM;  Surgeon: Shane Ann MD;  Location:  [facility] OR;  Service: General    COLONOSCOPY N/A 07/14/2020    Procedure: COLONOSCOPY WITH ANESTHESIA;  Surgeon: Anupam Morales DO;  Location: [facility] ENDOSCOPY;  Service: Gastroenterology;  Laterality: N/A;  pre: abdominal pain  post: diverticulosis  Vinayak Correia PA    CORONARY ANGIOPLASTY      CORONARY ARTERY BYPASS GRAFT N/A 07/06/2019    Procedure: CABG X2 WITH LIMA, LEFT LEG OVH, AND PLACEMENT OF LEFT FEMORAL ARTERIAL LINE;  Surgeon: Steven Tang MD;  Location:  [facility] OR;  Service: Cardiothoracic    CORONARY STENT PLACEMENT      x 6    CYSTOSCOPY TRANSURETHRAL RESECTION OF PROSTATE N/A 01/23/2023    Procedure: CYSTOSCOPY TRANSURETHRAL RESECTION OF PROSTATE;  Surgeon: Latrell Pope MD;  Location:  [facility] OR;  Service: Urology;  Laterality: N/A;    ENDOSCOPIC FUNCTIONAL SINUS SURGERY (FESS) Bilateral 12/13/2017    Procedure: PROCEDURE PERFORMED:  Bilateral functional endoscopic anterior ethmoidectomy with bilateral middle meatal antrostomy Septoplasty Right kathia bullosa resection Bilateral inferior turbinate reduction via Coblation;  Surgeon: Mayank Ibarra MD;  Location: [facility] OR;  Service:      ENDOSCOPY N/A 2018    Procedure: ESOPHAGOGASTRODUODENOSCOPY WITH ANESTHESIA;  Surgeon: Benitez Mas MD;  Location:  [facility] ENDOSCOPY;  Service: Gastroenterology    ENDOSCOPY N/A 2020    Procedure: ESOPHAGOGASTRODUODENOSCOPY WITH ANESTHESIA;  Surgeon: Anupam Morales DO;  Location:  [facility] ENDOSCOPY;  Service: Gastroenterology;  Laterality: N/A;  pre: abdominal pain  post: esophagitis  Vinayak Correia, PA    HERNIA REPAIR      x2 inguinal area    KIDNEY STONE SURGERY      KNEE ARTHROSCOPY Right 2022    Procedure: RIGHT KNEE PARTIAL LATERAL MENISCECTOMY;  Surgeon: Pedro Pablo Song MD;  Location:  [facility] OR;  Service: Orthopedics;  Laterality: Right;    KNEE SURGERY Right     OTHER SURGICAL HISTORY      urolift    PROSTATE SURGERY      Dr. Badillo -     PULMONARY ARTERY PRESSURE SENSOR IMPLANT N/A 2023    Procedure: PA Pressure Sensor Implant;  Surgeon: Wade Ramey MD;  Location:  [facility];  Service: Cardiology;  Laterality: N/A;    ROTATOR CUFF REPAIR Right     SLEEP ENDOSCOPY N/A 2023    Procedure: Videosleep endoscopy;  Surgeon: Mayank Ibarra MD;  Location:  [facility] OR;  Service: ENT;  Laterality: N/A;    THUMB AMPUTATION Left     partial    TOE SURGERY      great toe     Social History     Socioeconomic History    Marital status:    Tobacco Use    Smoking status: Former     Current packs/day: 0.00     Average packs/day: 1.5 packs/day for 18.0 years (27.0 ttl pk-yrs)     Types: Cigarettes, Cigars     Start date:      Quit date:      Years since quittin.4     Passive exposure: Past    Smokeless tobacco: Former     Types: Chew     Quit date:    Vaping Use    Vaping status: Never Used   Substance and Sexual Activity    Alcohol use: No     Comment: 0    Drug use: No    Sexual activity: Defer     Family History   Problem Relation Age of Onset    Heart disease Father     COPD Mother     Hypertension Mother     Asthma Mother      No Known Problems Sister     Colon cancer Paternal Uncle     Prostate cancer Maternal Grandfather     No Known Problems Sister     Colon cancer Maternal Grandmother     Colon polyps Maternal Grandmother        Allergies:  Allergies   Allergen Reactions    Flagyl [Metronidazole] Hives    Atorvastatin Other (See Comments) and Myalgia      - LIPITOR -   Muscle cramps    Ciprofloxacin Hives      - CIPRO -        Medications:    Current Facility-Administered Medications:     acetaminophen (TYLENOL) tablet 650 mg, 650 mg, Oral, Q4H PRN **OR** acetaminophen (TYLENOL) 160 MG/5ML oral solution 650 mg, 650 mg, Oral, Q4H PRN **OR** acetaminophen (TYLENOL) suppository 650 mg, 650 mg, Rectal, Q4H PRN, Ashtyn Beckman, APRN    albuterol (PROVENTIL) nebulizer solution 0.083% 2.5 mg/3mL, 2.5 mg, Nebulization, Q6H PRN, Ashtyn Beckman, APRN    apixaban (ELIQUIS) tablet 5 mg, 5 mg, Oral, BID, Ashtyn Beckman, APRN, 5 mg at 05/21/24 1236    aspirin chewable tablet 81 mg, 81 mg, Oral, Daily, Ashtyn Beckman, APRN, 81 mg at 05/21/24 1236    sennosides-docusate (PERICOLACE) 8.6-50 MG per tablet 2 tablet, 2 tablet, Oral, BID PRN **AND** polyethylene glycol (MIRALAX) packet 17 g, 17 g, Oral, Daily PRN **AND** bisacodyl (DULCOLAX) EC tablet 5 mg, 5 mg, Oral, Daily PRN **AND** bisacodyl (DULCOLAX) suppository 10 mg, 10 mg, Rectal, Daily PRN, Ashtyn Beckman, APRN    gabapentin (NEURONTIN) capsule 300 mg, 300 mg, Oral, Nightly, Ahstyn Beckman, APRN, 300 mg at 05/21/24 0004    insulin glargine (LANTUS, SEMGLEE) injection 50 Units, 50 Units, Subcutaneous, Q12H, Ashtyn Beckman, APRN, 50 Units at 05/21/24 0005    isosorbide mononitrate (IMDUR) 24 hr tablet 120 mg, 120 mg, Oral, Daily, Ashtyn Beckman, APRN, 120 mg at 05/21/24 1235    lamoTRIgine (LaMICtal) tablet 200 mg, 200 mg, Oral, BID, Ashtyn Beckman APRN, 200 mg at 05/21/24 1235    levETIRAcetam (KEPPRA) tablet 1,500 mg, 1,500 mg, Oral, Daily, Ashtyn Beckman,  APRN, 1,500 mg at 05/21/24 1234    levETIRAcetam (KEPPRA) tablet 2,000 mg, 2,000 mg, Oral, Nightly, Ashtyn Beckman, APRN, 2,000 mg at 05/21/24 0006    metoprolol tartrate (LOPRESSOR) tablet 100 mg, 100 mg, Oral, BID, Ashtyn Beckman, APRN, 100 mg at 05/21/24 1235    nitroglycerin (NITROSTAT) SL tablet 0.4 mg, 0.4 mg, Sublingual, Q5 Min PRN, Ashtyn Beckman APRN    ondansetron ODT (ZOFRAN-ODT) disintegrating tablet 4 mg, 4 mg, Oral, Q6H PRN **OR** ondansetron (ZOFRAN) injection 4 mg, 4 mg, Intravenous, Q6H PRN, Ashtyn Beckman, APRN    pantoprazole (PROTONIX) EC tablet 40 mg, 40 mg, Oral, BID AC, Ashtyn Beckman APRN    rosuvastatin (CRESTOR) tablet 20 mg, 20 mg, Oral, Nightly, Ashtyn Beckman, APRN, 20 mg at 05/21/24 0005    sacubitril-valsartan (ENTRESTO) 49-51 MG tablet 2 tablet, 2 tablet, Oral, Q12H, Ashtyn Beckman, APRN, 2 tablet at 05/21/24 1235    sodium chloride 0.9 % flush 10 mL, 10 mL, Intravenous, PRN, Emergency, Triage Protocol, MD    sodium chloride 0.9 % flush 10 mL, 10 mL, Intravenous, Q12H, Ashtyn Beckman, APRN, 10 mL at 05/21/24 0915    sodium chloride 0.9 % flush 10 mL, 10 mL, Intravenous, PRN, Ashtyn Beckman, APRN    sodium chloride 0.9 % infusion 40 mL, 40 mL, Intravenous, PRN, Ashtyn Beckman, APRN    spironolactone (ALDACTONE) tablet 50 mg, 50 mg, Oral, Daily, Ashtyn Beckman, APRN, 50 mg at 05/21/24 1234    tamsulosin (FLOMAX) 24 hr capsule 0.4 mg, 0.4 mg, Oral, Daily, Ashtyn Beckman, APRN, 0.4 mg at 05/21/24 1234    Current Outpatient Medications:     apixaban (ELIQUIS) 5 MG tablet tablet, Take 1 tablet by mouth 2 (Two) Times a Day., Disp: , Rfl:     Aspirin 81 MG capsule, Take 81 mg by mouth Daily. Dr. Ramey or Dr. Tang, Disp: , Rfl:     empagliflozin (JARDIANCE) 10 MG tablet tablet, Take 1 tablet by mouth Daily., Disp: , Rfl:     ezetimibe (ZETIA) 10 MG tablet, Take 1 tablet by mouth Daily., Disp: , Rfl:     fluticasone (FLONASE) 50 MCG/ACT nasal spray, 2  sprays into the nostril(s) as directed by provider Daily., Disp: , Rfl:     gabapentin (NEURONTIN) 300 MG capsule, Take 1 capsule by mouth Every Night., Disp: , Rfl:     insulin aspart (novoLOG FLEXPEN) 100 UNIT/ML solution pen-injector sc pen, Inject 40 Units under the skin into the appropriate area as directed 3 (Three) Times a Day With Meals. Can use 5 to 6 more units if needed in early evening., Disp: , Rfl:     INSULIN GLARGINE-YFGN SC, Inject 100 Units under the skin into the appropriate area as directed Every Night., Disp: , Rfl:     INSULIN GLARGINE-YFGN SC, Inject 80 Units under the skin into the appropriate area as directed Daily., Disp: , Rfl:     isosorbide mononitrate (IMDUR) 120 MG 24 hr tablet, Take 1 tablet by mouth Daily., Disp: 90 tablet, Rfl: 3    lamoTRIgine (LaMICtal) 200 MG tablet, Take 1 tablet by mouth 2 (Two) Times a Day., Disp: 180 tablet, Rfl: 1    levETIRAcetam (KEPPRA) 500 MG tablet, Take 3 tablets by mouth Every Morning., Disp: 90 tablet, Rfl: 5    levETIRAcetam (KEPPRA) 500 MG tablet, Take 4 tablets by mouth Every Night., Disp: 120 tablet, Rfl: 5    metFORMIN (GLUCOPHAGE) 1000 MG tablet, Take 1 tablet by mouth 2 (Two) Times a Day With Meals., Disp: , Rfl:     metoprolol tartrate (LOPRESSOR) 100 MG tablet, Take 1 tablet by mouth 2 (Two) Times a Day., Disp: , Rfl:     Multiple Vitamin (MULTI VITAMIN PO), Take 1 tablet by mouth Daily., Disp: , Rfl:     pantoprazole (PROTONIX) 40 MG EC tablet, Take 1 tablet by mouth 2 (Two) Times a Day., Disp: , Rfl:     polyethylene glycol (MIRALAX) 17 GM/SCOOP powder, Take two scoops twice a day until bowel movements normalize, Disp: 578 g, Rfl: 0    rosuvastatin (CRESTOR) 40 MG tablet, Take 0.5 tablets by mouth Every Night., Disp: , Rfl:     sacubitril-valsartan (Entresto)  MG tablet, Take 1 tablet by mouth 2 (Two) Times a Day., Disp: , Rfl:     Semaglutide,0.25 or 0.5MG/DOS, (Ozempic, 0.25 or 0.5 MG/DOSE,) 2 MG/1.5ML solution pen-injector, Inject  0.5 mg under the skin into the appropriate area as directed 1 (One) Time Per Week. Monday, Disp: , Rfl:     sulfamethoxazole-trimethoprim (BACTRIM,SEPTRA) 400-80 MG tablet, Take 1 tablet by mouth 2 (Two) Times a Day., Disp: , Rfl:     tamsulosin (FLOMAX) 0.4 MG capsule 24 hr capsule, Take 1 capsule by mouth Daily., Disp: , Rfl:     acetaminophen (TYLENOL) 325 MG tablet, Take 1 tablet by mouth Every 6 (Six) Hours As Needed for Mild Pain., Disp: , Rfl:     albuterol (PROVENTIL HFA;VENTOLIN HFA) 108 (90 BASE) MCG/ACT inhaler, Inhale 2 puffs Every 6 (Six) Hours As Needed for Wheezing., Disp: , Rfl:     calcium polycarbophil (FIBERCON) 625 MG tablet, Take 1 tablet by mouth Daily As Needed., Disp: , Rfl:     carboxymethylcellulose (REFRESH PLUS) 0.5 % solution, Administer 1 drop to both eyes 4 (Four) Times a Day As Needed for Dry Eyes., Disp: , Rfl:     furosemide (Lasix) 40 MG tablet, Take 1 tablet by mouth Daily As Needed (take as needed for weight gain more than 2 pounds in a day or more than than 3 pounds in 2 days.)., Disp: 90 tablet, Rfl: 1    guaiFENesin (MUCINEX) 600 MG 12 hr tablet, Take 2 tablets by mouth Daily As Needed for Congestion., Disp: , Rfl:     nitroglycerin (NITROSTAT) 0.4 MG SL tablet, Place 1 tablet under the tongue Every 5 (Five) Minutes As Needed for Chest Pain. Take no more than 3 doses in 15 minutes., Disp: 25 tablet, Rfl: 2    psyllium (METAMUCIL) 58.6 % packet, Take 1 packet by mouth Daily As Needed (constipation)., Disp: , Rfl:     Rimegepant Sulfate (Nurtec) 75 MG tablet dispersible tablet, Take 1 tablet by mouth 3 (Three) Times a Day As Needed (migraine)., Disp: , Rfl:     spironolactone (ALDACTONE) 50 MG tablet, Take 1 tablet by mouth Daily. (Patient not taking: Reported on 5/21/2024), Disp: , Rfl:     Review of Systems: ***  Review of Systems      OBJECTIVE     Vitals:    05/21/24 1138   BP: 130/78   Pulse:    Resp:    Temp: 98.3 °F (36.8 °C)   SpO2:        PHYSICAL EXAM: ***  Physical  Exam       Results Review:  Lab Results (last 48 hours)       Procedure Component Value Units Date/Time    POC Glucose Once [[id number]]  (Normal) Collected: 05/21/24 1235    Specimen: Blood Updated: 05/21/24 1246     Glucose 118 mg/dL      Comment: : Dee Anaya ID: [id number]       TSH [[id number]]  (Normal) Collected: 05/21/24 0526    Specimen: Blood Updated: 05/21/24 0604     TSH 1.670 uIU/mL     Lipid Panel [[id number]]  (Abnormal) Collected: 05/21/24 0526    Specimen: Blood Updated: 05/21/24 0600     Total Cholesterol 97 mg/dL      Triglycerides 214 mg/dL      HDL Cholesterol 26 mg/dL      LDL Cholesterol  37 mg/dL      VLDL Cholesterol 34 mg/dL      LDL/HDL Ratio 1.08    Narrative:      Cholesterol Reference Ranges  (U.S. Department of Health and Human Services ATP III Classifications)    Desirable          <200 mg/dL  Borderline High    200-239 mg/dL  High Risk          >240 mg/dL      Triglyceride Reference Ranges  (U.S. Department of Health and Human Services ATP III Classifications)    Normal           <150 mg/dL  Borderline High  150-199 mg/dL  High             200-499 mg/dL  Very High        >500 mg/dL    HDL Reference Ranges  (U.S. Department of Health and Human Services ATP III Classifications)    Low     <40 mg/dl (major risk factor for CHD)  High    >60 mg/dl ('negative' risk factor for CHD)        LDL Reference Ranges  (U.S. Department of Health and Human Services ATP III Classifications)    Optimal          <100 mg/dL  Near Optimal     100-129 mg/dL  Borderline High  130-159 mg/dL  High             160-189 mg/dL  Very High        >189 mg/dL    Basic Metabolic Panel [[id number]]  (Abnormal) Collected: 05/21/24 0526    Specimen: Blood Updated: 05/21/24 0558     Glucose 186 mg/dL      BUN 24 mg/dL      Creatinine 1.13 mg/dL      Sodium 141 mmol/L      Potassium 4.3 mmol/L      Comment: Slight hemolysis detected by analyzer. Result may be falsely elevated.        Chloride 109  mmol/L      CO2 22.0 mmol/L      Calcium 9.2 mg/dL      BUN/Creatinine Ratio 21.2     Anion Gap 10.0 mmol/L      eGFR 71.7 mL/min/1.73     Narrative:      GFR Normal >60  Chronic Kidney Disease <60  Kidney Failure <15      Hemoglobin A1c [[id number]]  (Abnormal) Collected: 05/21/24 0526    Specimen: Blood Updated: 05/21/24 0554     Hemoglobin A1C 6.80 %     Narrative:      Hemoglobin A1C Ranges:    Increased Risk for Diabetes  5.7% to 6.4%  Diabetes                     >= 6.5%  Diabetic Goal                < 7.0%    High Sensitivity Troponin T 2Hr [[id number]]  (Normal) Collected: 05/20/24 2158    Specimen: Blood Updated: 05/20/24 2223     HS Troponin T 13 ng/L      Troponin T Delta 1 ng/L     Narrative:      High Sensitive Troponin T Reference Range:  <14.0 ng/L- Negative Female for AMI  <22.0 ng/L- Negative Male for AMI  >=14 - Abnormal Female indicating possible myocardial injury.  >=22 - Abnormal Male indicating possible myocardial injury.   Clinicians would have to utilize clinical acumen, EKG, Troponin, and serial changes to determine if it is an Acute Myocardial Infarction or myocardial injury due to an underlying chronic condition.         Respiratory Panel PCR w/COVID-19(SARS-CoV-2) [facility] In-House, NP Swab in UTM/VTM, 2 HR TAT - Swab, Nasopharynx [[id number]]  (Normal) Collected: 05/20/24 2006    Specimen: Swab from Nasopharynx Updated: 05/20/24 2105     ADENOVIRUS, PCR Not Detected     Coronavirus 229E Not Detected     Coronavirus HKU1 Not Detected     Coronavirus NL63 Not Detected     Coronavirus OC43 Not Detected     COVID19 Not Detected     Human Metapneumovirus Not Detected     Human Rhinovirus/Enterovirus Not Detected     Influenza A PCR Not Detected     Influenza B PCR Not Detected     Parainfluenza Virus 1 Not Detected     Parainfluenza Virus 2 Not Detected     Parainfluenza Virus 3 Not Detected     Parainfluenza Virus 4 Not Detected     RSV, PCR Not Detected     Bordetella pertussis  pcr Not Detected     Bordetella parapertussis PCR Not Detected     Chlamydophila pneumoniae PCR Not Detected     Mycoplasma pneumo by PCR Not Detected    Narrative:      In the setting of a positive respiratory panel with a viral infection PLUS a negative procalcitonin without other underlying concern for bacterial infection, consider observing off antibiotics or discontinuation of antibiotics and continue supportive care. If the respiratory panel is positive for atypical bacterial infection (Bordetella pertussis, Chlamydophila pneumoniae, or Mycoplasma pneumoniae), consider antibiotic de-escalation to target atypical bacterial infection.    BNP [[id number]]  (Normal) Collected: 05/20/24 2005    Specimen: Blood Updated: 05/20/24 2055     proBNP 82.8 pg/mL     Narrative:      This assay is used as an aid in the diagnosis of individuals suspected of having heart failure. It can be used as an aid in the diagnosis of acute decompensated heart failure (ADHF) in patients presenting with signs and symptoms of ADHF to the emergency department (ED). In addition, NT-proBNP of <300 pg/mL indicates ADHF is not likely.    Age Range Result Interpretation  NT-proBNP Concentration (pg/mL:      <50             Positive            >450                   Gray                 300-450                    Negative             <300    50-75           Positive            >900                  Gray                300-900                  Negative            <300      >75             Positive            >1800                  Gray                300-1800                  Negative            <300    D-dimer, Quantitative [[id number]]  (Normal) Collected: 05/20/24 2005    Specimen: Blood Updated: 05/20/24 2051     D-Dimer, Quantitative 0.35 MCGFEU/mL     Narrative:      According to the assay 's published package insert, a normal (<0.50 MCGFEU/mL) D-dimer result in conjunction with a non-high clinical probability assessment, excludes  "deep vein thrombosis (DVT) and pulmonary embolism (PE) with high sensitivity.    D-dimer values increase with age and this can make VTE exclusion of an older population difficult. To address this, the [employer], based on best available evidence and recent guidelines, recommends that clinicians use age-adjusted D-dimer thresholds in patients greater than 50 years of age with: a) a low probability of PE who do not meet all Pulmonary Embolism Rule Out Criteria, or b) in those with intermediate probability of PE.   The formula for an age-adjusted D-dimer cut-off is \"age/100\".  For example, a 60 year old patient would have an age-adjusted cut-off of 0.60 MCGFEU/mL and an 80 year old 0.80 MCGFEU/mL.    CBC & Differential [[id number]]  (Abnormal) Collected: 05/20/24 2005    Specimen: Blood Updated: 05/20/24 2043    Narrative:      The following orders were created for panel order CBC & Differential.  Procedure                               Abnormality         Status                     ---------                               -----------         ------                     CBC Auto Differential[[id number]]        Abnormal            Final result                 Please view results for these tests on the individual orders.    CBC Auto Differential [[id number]]  (Abnormal) Collected: 05/20/24 2005    Specimen: Blood Updated: 05/20/24 2043     WBC 6.08 10*3/mm3      RBC 4.64 10*6/mm3      Hemoglobin 13.8 g/dL      Hematocrit 42.3 %      MCV 91.2 fL      MCH 29.7 pg      MCHC 32.6 g/dL      RDW 13.4 %      RDW-SD 44.6 fl      MPV 11.8 fL      Platelets 102 10*3/mm3      Neutrophil % 42.8 %      Lymphocyte % 40.6 %      Monocyte % 8.9 %      Eosinophil % 2.6 %      Basophil % 1.3 %      Immature Grans % 3.8 %      Neutrophils, Absolute 2.60 10*3/mm3      Lymphocytes, Absolute 2.47 10*3/mm3      Monocytes, Absolute 0.54 10*3/mm3      Eosinophils, Absolute 0.16 10*3/mm3      Basophils, Absolute 0.08 10*3/mm3      " Immature Grans, Absolute 0.23 10*3/mm3     Comprehensive Metabolic Panel [[id number]]  (Abnormal) Collected: 05/20/24 2005    Specimen: Blood Updated: 05/20/24 2039     Glucose 119 mg/dL      BUN 22 mg/dL      Creatinine 1.29 mg/dL      Sodium 139 mmol/L      Potassium 4.5 mmol/L      Comment: Slight hemolysis detected by analyzer. Result may be falsely elevated.        Chloride 107 mmol/L      CO2 21.0 mmol/L      Calcium 9.6 mg/dL      Total Protein 7.1 g/dL      Albumin 4.2 g/dL      ALT (SGPT) 22 U/L      AST (SGOT) 21 U/L      Comment: Slight hemolysis detected by analyzer. Result may be falsely elevated.        Alkaline Phosphatase 74 U/L      Total Bilirubin 0.6 mg/dL      Globulin 2.9 gm/dL      A/G Ratio 1.4 g/dL      BUN/Creatinine Ratio 17.1     Anion Gap 11.0 mmol/L      eGFR 61.2 mL/min/1.73     Narrative:      GFR Normal >60  Chronic Kidney Disease <60  Kidney Failure <15      High Sensitivity Troponin T [[id number]]  (Normal) Collected: 05/20/24 2005    Specimen: Blood Updated: 05/20/24 2034     HS Troponin T 12 ng/L     Narrative:      High Sensitive Troponin T Reference Range:  <14.0 ng/L- Negative Female for AMI  <22.0 ng/L- Negative Male for AMI  >=14 - Abnormal Female indicating possible myocardial injury.  >=22 - Abnormal Male indicating possible myocardial injury.   Clinicians would have to utilize clinical acumen, EKG, Troponin, and serial changes to determine if it is an Acute Myocardial Infarction or myocardial injury due to an underlying chronic condition.         [facility] Draw [[id number]] Collected: 05/20/24 2005    Specimen: Blood Updated: 05/20/24 2016    Narrative:      The following orders were created for panel order [facility] Draw.  Procedure                               Abnormality         Status                     ---------                               -----------         ------                     Green Top (Gel)[[id number]]                                  Final result                Lavender Top[[id number]]                                     Final result               Red Top[[id number]]                                          Final result               Light Blue Top[[id number]]                                   Final result                 Please view results for these tests on the individual orders.    Green Top (Gel) [[id number]] Collected: 05/20/24 2005    Specimen: Blood Updated: 05/20/24 2016     Extra Tube Hold for add-ons.     Comment: Auto resulted.       Lavender Top [[id number]] Collected: 05/20/24 2005    Specimen: Blood Updated: 05/20/24 2016     Extra Tube hold for add-on     Comment: Auto resulted       Red Top [[id number]] Collected: 05/20/24 2005    Specimen: Blood Updated: 05/20/24 2016     Extra Tube Hold for add-ons.     Comment: Auto resulted.       Light Blue Top [[id number]] Collected: 05/20/24 2005    Specimen: Blood Updated: 05/20/24 2016     Extra Tube Hold for add-ons.     Comment: Auto resulted             Imaging Results (Last 72 Hours)       Procedure Component Value Units Date/Time    XR Chest 1 View [[id number]] Collected: 05/20/24 2043     Updated: 05/20/24 2047    Narrative:      EXAMINATION:  XR CHEST 1 VW-  5/20/2024 7:24 PM     HISTORY: Chest pain triage protocol.     COMPARISON: 9/21/2023. 6/28/2023.     TECHNIQUE: Single view AP image.     FINDINGS: There is mild hypoventilation with vascular crowding. Coarse  markings and bronchial wall thickening appear stable. No dense  infiltrate is seen. No significant pleural effusion is seen. There is  scoliosis and degenerative change of the visualized spine.          Impression:      1. Mild hypoventilation with vascular crowding.  2. Coarse markings and bronchial wall thickening, stable. No new  infiltrate or effusion.           This report was signed and finalized on 5/20/2024 8:44 PM by Dr. Wojciech Davidson MD.                  ASSESSMENT/PLAN       Examination and evaluation of wound(s) was  performed.    DIAGNOSIS:   ***    PLAN:   Patient is being discharged.   He has rescheduled wound care appointment for Friday.   Patient is to continue antibiotics and dressing changes with use of Silvercel as previously ordered.   No other needs at this time.     Discussed findings and treatment plan including risks, benefits, and treatment options with patient in detail. Patient agreed with treatment plan.      This document has been electronically signed by TEENA Ferreira on 5/21/2024 15:30 CDT

## 2024-05-22 NOTE — OUTREACH NOTE
Prep Survey      Flowsheet Row Responses   [facility] facility patient discharged from? [place]   Is LACE score < 7 ? No   Eligibility Readm Mgmt   Discharge diagnosis Chest pain   Does the patient have one of the following disease processes/diagnoses(primary or secondary)? Other   Does the patient have Home health ordered? No   Is there a DME ordered? No   Prep survey completed? Yes            MESSI SEGURA - Registered Nurse

## 2024-05-24 ENCOUNTER — OFFICE VISIT (OUTPATIENT)
Dept: WOUND CARE | Facility: HOSPITAL | Age: 64
End: 2024-05-24
Payer: MEDICARE

## 2024-05-24 ENCOUNTER — READMISSION MANAGEMENT (OUTPATIENT)
Dept: CALL CENTER | Facility: HOSPITAL | Age: 64
End: 2024-05-24
Payer: MEDICARE

## 2024-05-24 PROCEDURE — G0463 HOSPITAL OUTPT CLINIC VISIT: HCPCS

## 2024-05-24 NOTE — OUTREACH NOTE
Medical Week 1 Survey      Flowsheet Row Responses   [facility] patient discharged from? [place]   Does the patient have one of the following disease processes/diagnoses(primary or secondary)? Other   Week 1 attempt successful? No   Unsuccessful attempts Attempt 1            Kim Downey Registered Nurse

## 2024-05-28 ENCOUNTER — READMISSION MANAGEMENT (OUTPATIENT)
Dept: CALL CENTER | Facility: HOSPITAL | Age: 64
End: 2024-05-28
Payer: MEDICARE

## 2024-05-28 NOTE — OUTREACH NOTE
Medical Week 1 Survey      Flowsheet Row Responses   [facility] patient discharged from? [place]   Does the patient have one of the following disease processes/diagnoses(primary or secondary)? Other   Week 1 attempt successful? Yes   Call start time 1130   Call end time 1131   Discharge diagnosis Chest pain   Person spoke with today (if not patient) and relationship pt   Meds reviewed with patient/caregiver? Yes   Is the patient having any side effects they believe may be caused by any medication additions or changes? No   Does the patient have all medications ordered at discharge? Yes   Is the patient taking all medications as directed (includes completed medication regime)? Yes   Does the patient have a primary care provider?  Yes   Does the patient have an appointment with their PCP within 7 days of discharge? Greater than 7 days   What is preventing the patient from scheduling follow up appointments within 7 days of discharge? Haven't had time   Has the patient kept scheduled appointments due by today? N/A   Psychosocial issues? No   Did the patient receive a copy of their discharge instructions? Yes   Nursing interventions Reviewed instructions with patient   What is the patient's perception of their health status since discharge? Improving   Is the patient/caregiver able to teach back signs and symptoms related to disease process for when to call PCP? Yes   Is the patient/caregiver able to teach back signs and symptoms related to disease process for when to call 911? Yes   Is the patient/caregiver able to teach back the hierarchy of who to call/visit for symptoms/problems? PCP, Specialist, Home health nurse, Urgent Care, ED, 911 Yes   If the patient is a current smoker, are they able to teach back resources for cessation? Not a smoker   Week 1 call completed? Yes   Would this patient benefit from a Referral to [facility] Social Work? No   Is the patient interested in additional calls from an ambulatory case  manager? No   Wrap up additional comments denies any questions or concerns   Call end time 1137            MESSI SEGURA - Registered Nurse

## 2024-06-04 ENCOUNTER — TELEPHONE (OUTPATIENT)
Dept: CARDIOLOGY | Facility: CLINIC | Age: 64
End: 2024-06-04
Payer: MEDICARE

## 2024-06-04 NOTE — TELEPHONE ENCOUNTER
Caller: Carlos A Boyer Jr.    Relationship to patient: Self    Best call back number: [phone]    New or established patient?  [] New  [x] Established    Date of discharge: 5-21-24    Facility discharged from:  [facility]    Diagnosis/Symptoms: CHEST PAIN    Length of stay (If applicable):     Specialty Only: Did you see a [facility] health provider?    [x] Yes  [] No  If so, who? HAILEY JOHNSON MD

## 2024-06-05 ENCOUNTER — TELEPHONE (OUTPATIENT)
Dept: CARDIOLOGY | Facility: CLINIC | Age: 64
End: 2024-06-05
Payer: MEDICARE

## 2024-06-05 NOTE — TELEPHONE ENCOUNTER
OKAY FOR HUB TO RELAY    CALL PLACED TO PT - NO ANSWER    PT NEEDS TO REPORT TO ER IF SYMPTOMATIC WITH CHEST PAIN.    IF HE WOULD LIKE A SOONER APPOINTMENT, WE'D BE HAPPY TO ACCOMMODATE

## 2024-06-05 NOTE — TELEPHONE ENCOUNTER
OKAY FOR HUB TO RELAY    ATTEMPTED TO REACH PT WITH NO SUCCESS - PT NEEDS TO REPORT TO ER IF SYMPTOMATIC WITH CHEST PAIN.

## 2024-06-06 ENCOUNTER — OFFICE VISIT (OUTPATIENT)
Dept: UROLOGY | Facility: CLINIC | Age: 64
End: 2024-06-06
Payer: MEDICARE

## 2024-06-06 ENCOUNTER — OFFICE VISIT (OUTPATIENT)
Dept: WOUND CARE | Facility: HOSPITAL | Age: 64
End: 2024-06-06
Payer: MEDICARE

## 2024-06-06 VITALS — BODY MASS INDEX: 35.08 KG/M2 | HEIGHT: 72 IN | WEIGHT: 259 LBS | TEMPERATURE: 98.6 F

## 2024-06-06 DIAGNOSIS — N13.8 BPH WITH OBSTRUCTION/LOWER URINARY TRACT SYMPTOMS: Primary | ICD-10-CM

## 2024-06-06 DIAGNOSIS — N40.1 BPH WITH OBSTRUCTION/LOWER URINARY TRACT SYMPTOMS: Primary | ICD-10-CM

## 2024-06-06 RX ORDER — VIBEGRON 75 MG/1
1 TABLET, FILM COATED ORAL DAILY
Qty: 42 TABLET | Refills: 0 | COMMUNITY
Start: 2024-06-06 | End: 2024-07-18

## 2024-06-13 ENCOUNTER — OFFICE VISIT (OUTPATIENT)
Dept: WOUND CARE | Facility: HOSPITAL | Age: 64
End: 2024-06-13
Payer: MEDICARE

## 2024-06-21 ENCOUNTER — LAB REQUISITION (OUTPATIENT)
Dept: LAB | Facility: HOSPITAL | Age: 64
End: 2024-06-21
Payer: MEDICARE

## 2024-06-21 ENCOUNTER — OFFICE VISIT (OUTPATIENT)
Dept: WOUND CARE | Facility: HOSPITAL | Age: 64
End: 2024-06-21
Payer: MEDICARE

## 2024-06-21 DIAGNOSIS — E11.621 TYPE 2 DIABETES MELLITUS WITH FOOT ULCER (CODE): ICD-10-CM

## 2024-06-21 PROCEDURE — 87075 CULTR BACTERIA EXCEPT BLOOD: CPT

## 2024-06-21 PROCEDURE — 87070 CULTURE OTHR SPECIMN AEROBIC: CPT

## 2024-06-21 PROCEDURE — 87186 SC STD MICRODIL/AGAR DIL: CPT

## 2024-06-21 PROCEDURE — 87205 SMEAR GRAM STAIN: CPT

## 2024-06-21 PROCEDURE — 87176 TISSUE HOMOGENIZATION CULTR: CPT

## 2024-06-21 PROCEDURE — 87077 CULTURE AEROBIC IDENTIFY: CPT

## 2024-06-22 ENCOUNTER — APPOINTMENT (OUTPATIENT)
Dept: CT IMAGING | Facility: HOSPITAL | Age: 64
End: 2024-06-22
Payer: OTHER GOVERNMENT

## 2024-06-22 ENCOUNTER — NURSE TRIAGE (OUTPATIENT)
Dept: CALL CENTER | Facility: HOSPITAL | Age: 64
End: 2024-06-22
Payer: MEDICARE

## 2024-06-22 ENCOUNTER — APPOINTMENT (OUTPATIENT)
Dept: GENERAL RADIOLOGY | Facility: HOSPITAL | Age: 64
End: 2024-06-22
Payer: OTHER GOVERNMENT

## 2024-06-22 ENCOUNTER — HOSPITAL ENCOUNTER (EMERGENCY)
Facility: HOSPITAL | Age: 64
Discharge: HOME | End: 2024-06-23
Attending: EMERGENCY MEDICINE | Admitting: EMERGENCY MEDICINE
Payer: OTHER GOVERNMENT

## 2024-06-22 DIAGNOSIS — L03.031 CELLULITIS OF GREAT TOE OF RIGHT FOOT: Primary | ICD-10-CM

## 2024-06-22 LAB
ALBUMIN SERPL-MCNC: 4.1 G/DL (ref 3.5–5.2)
ALBUMIN/GLOB SERPL: 1.5 G/DL
ALP SERPL-CCNC: 74 U/L (ref 39–117)
ALT SERPL W P-5'-P-CCNC: 17 U/L (ref 1–41)
ANION GAP SERPL CALCULATED.3IONS-SCNC: 12 MMOL/L (ref 5–15)
AST SERPL-CCNC: 16 U/L (ref 1–40)
BASOPHILS # BLD AUTO: 0.03 10*3/MM3 (ref 0–0.2)
BASOPHILS NFR BLD AUTO: 0.6 % (ref 0–1.5)
BILIRUB SERPL-MCNC: 0.6 MG/DL (ref 0–1.2)
BUN SERPL-MCNC: 20 MG/DL (ref 8–23)
BUN/CREAT SERPL: 14.3 (ref 7–25)
CALCIUM SPEC-SCNC: 9.3 MG/DL (ref 8.6–10.5)
CHLORIDE SERPL-SCNC: 106 MMOL/L (ref 98–107)
CO2 SERPL-SCNC: 22 MMOL/L (ref 22–29)
CREAT SERPL-MCNC: 1.4 MG/DL (ref 0.76–1.27)
D-LACTATE SERPL-SCNC: 1.1 MMOL/L (ref 0.5–2)
DEPRECATED RDW RBC AUTO: 42.8 FL (ref 37–54)
EGFRCR SERPLBLD CKD-EPI 2021: 55.4 ML/MIN/1.73
EOSINOPHIL # BLD AUTO: 0.24 10*3/MM3 (ref 0–0.4)
EOSINOPHIL NFR BLD AUTO: 4.6 % (ref 0.3–6.2)
ERYTHROCYTE [DISTWIDTH] IN BLOOD BY AUTOMATED COUNT: 13.3 % (ref 12.3–15.4)
GLOBULIN UR ELPH-MCNC: 2.7 GM/DL
GLUCOSE SERPL-MCNC: 171 MG/DL (ref 65–99)
HCT VFR BLD AUTO: 39.1 % (ref 37.5–51)
HGB BLD-MCNC: 13.1 G/DL (ref 13–17.7)
IMM GRANULOCYTES # BLD AUTO: 0.03 10*3/MM3 (ref 0–0.05)
IMM GRANULOCYTES NFR BLD AUTO: 0.6 % (ref 0–0.5)
LIPASE SERPL-CCNC: 15 U/L (ref 13–60)
LYMPHOCYTES # BLD AUTO: 2.06 10*3/MM3 (ref 0.7–3.1)
LYMPHOCYTES NFR BLD AUTO: 39.2 % (ref 19.6–45.3)
MCH RBC QN AUTO: 29.8 PG (ref 26.6–33)
MCHC RBC AUTO-ENTMCNC: 33.5 G/DL (ref 31.5–35.7)
MCV RBC AUTO: 88.9 FL (ref 79–97)
MONOCYTES # BLD AUTO: 0.43 10*3/MM3 (ref 0.1–0.9)
MONOCYTES NFR BLD AUTO: 8.2 % (ref 5–12)
NEUTROPHILS NFR BLD AUTO: 2.47 10*3/MM3 (ref 1.7–7)
NEUTROPHILS NFR BLD AUTO: 46.8 % (ref 42.7–76)
NRBC BLD AUTO-RTO: 0 /100 WBC (ref 0–0.2)
PLATELET # BLD AUTO: 125 10*3/MM3 (ref 140–450)
PMV BLD AUTO: 11.3 FL (ref 6–12)
POTASSIUM SERPL-SCNC: 4.1 MMOL/L (ref 3.5–5.2)
PROT SERPL-MCNC: 6.8 G/DL (ref 6–8.5)
RBC # BLD AUTO: 4.4 10*6/MM3 (ref 4.14–5.8)
SODIUM SERPL-SCNC: 140 MMOL/L (ref 136–145)
WBC NRBC COR # BLD AUTO: 5.26 10*3/MM3 (ref 3.4–10.8)

## 2024-06-22 PROCEDURE — 85025 COMPLETE CBC W/AUTO DIFF WBC: CPT | Performed by: EMERGENCY MEDICINE

## 2024-06-22 PROCEDURE — 96365 THER/PROPH/DIAG IV INF INIT: CPT

## 2024-06-22 PROCEDURE — 99283 EMERGENCY DEPT VISIT LOW MDM: CPT

## 2024-06-22 PROCEDURE — 25010000002 CLINDAMYCIN 600 MG/50ML SOLUTION: Performed by: EMERGENCY MEDICINE

## 2024-06-22 PROCEDURE — 87040 BLOOD CULTURE FOR BACTERIA: CPT | Performed by: EMERGENCY MEDICINE

## 2024-06-22 PROCEDURE — 73630 X-RAY EXAM OF FOOT: CPT

## 2024-06-22 PROCEDURE — 80053 COMPREHEN METABOLIC PANEL: CPT | Performed by: EMERGENCY MEDICINE

## 2024-06-22 PROCEDURE — 83690 ASSAY OF LIPASE: CPT | Performed by: EMERGENCY MEDICINE

## 2024-06-22 PROCEDURE — 83605 ASSAY OF LACTIC ACID: CPT | Performed by: EMERGENCY MEDICINE

## 2024-06-22 PROCEDURE — 36415 COLL VENOUS BLD VENIPUNCTURE: CPT

## 2024-06-22 RX ORDER — SODIUM CHLORIDE 0.9 % (FLUSH) 0.9 %
10 SYRINGE (ML) INJECTION AS NEEDED
Status: DISCONTINUED | OUTPATIENT
Start: 2024-06-22 | End: 2024-06-23 | Stop reason: HOSPADM

## 2024-06-22 RX ORDER — SULFAMETHOXAZOLE AND TRIMETHOPRIM 400; 80 MG/1; MG/1
1 TABLET ORAL EVERY 12 HOURS SCHEDULED
COMMUNITY
Start: 2024-06-21

## 2024-06-22 RX ORDER — CLINDAMYCIN PHOSPHATE 600 MG/50ML
600 INJECTION, SOLUTION INTRAVENOUS ONCE
Status: COMPLETED | OUTPATIENT
Start: 2024-06-22 | End: 2024-06-23

## 2024-06-22 RX ADMIN — CLINDAMYCIN IN 5 PERCENT DEXTROSE 600 MG: 12 INJECTION, SOLUTION INTRAVENOUS at 23:53

## 2024-06-23 VITALS
HEIGHT: 72 IN | DIASTOLIC BLOOD PRESSURE: 72 MMHG | RESPIRATION RATE: 16 BRPM | OXYGEN SATURATION: 95 % | BODY MASS INDEX: 35.08 KG/M2 | WEIGHT: 259 LBS | TEMPERATURE: 97.9 F | SYSTOLIC BLOOD PRESSURE: 131 MMHG | HEART RATE: 80 BPM

## 2024-06-23 RX ORDER — CLINDAMYCIN HYDROCHLORIDE 300 MG/1
300 CAPSULE ORAL 4 TIMES DAILY
Qty: 28 CAPSULE | Refills: 0 | Status: SHIPPED | OUTPATIENT
Start: 2024-06-23 | End: 2024-07-01

## 2024-06-23 NOTE — TELEPHONE ENCOUNTER
"Reported pt had toe debrided yesterday at wound care. Was started on Bactrim twice a day. Has taken 2 doses. Toe is now more red and swollen, c/o pain earlier. T 94.7  Reason for Disposition   [1] Looks infected (spreading redness, red streak, pus) AND [2] fever    Additional Information   Negative: Sounds like a life-threatening emergency to the triager   Negative: Caused by an animal bite   Negative: Caused by a human bite   Negative: Foreign body in skin (e.g., splinter, sliver)   Negative: Injury is a puncture wound   Negative: Followed a foot or ankle injury   Negative: SEVERE pain   Negative: Foot is cool or blue in comparison to other side    Answer Assessment - Initial Assessment Questions  1. SYMPTOM: \"What's the main symptom you're concerned about?\" (e.g., rash, sore, callus, drainage, numbness)      Red and swollen and pain  2. LOCATION: \"Where is the  this located?\" (e.g., foot/toe, top/bottom, left/right)      Right great toe  3. APPEARANCE: \"What does the area look like?\" (e.g., normal, red, swollen; size)      Red and swollen  4. ONSET: \"When did the  this  start?\"      today  5. PAIN: \"Is there any pain?\" If Yes, ask: \"How bad is it?\" (Scale: 1-10; mild, moderate, severe)      Reported had pain and took aleve, relieved pain  6. CAUSE: \"What do you think is causing the symptoms?\"      Infection, the provider said yesterday she thought he had infection and started on abx  7. OTHER SYMPTOMS: \"Do you have any other symptoms?\" (e.g., fever, weakness)      Temp 94.7  8. PREGNANCY: \"Is there any chance you are pregnant?\" \"When was your last menstrual period?\"      N/a    Protocols used: Diabetes - Foot Problems and Questions-ADULT-AH    "

## 2024-06-23 NOTE — ED PROVIDER NOTES
Subjective   History of Present Illness  Patient presents with a complaint of a wound on his right great toe that he is active been battling for a number of years.  He is being treated for it in the wound care clinic.  He says he started noticing more infection and it yesterday and was seen in wound care clinic and started on an antibiotic that we have now determined was Bactrim.  He got last night's dose and then a couple doses today.  However tonight while he was watching TV he noted that his toe was much more swollen and red than it had been previously.  The redness is also kind of extending into the foot now but was not before.  He is concerned because of his diabetes and his chronic wound.  He also says he had a subnormal temperature of 94 at home and was told by an ER nurse that they could also be signs of infection.  Patient has no feeling in his toes because of his diabetes and chronic damage.    History provided by:  Patient   used: No    Toe Injury  Location:  Toe  Time since incident:  2 days  Injury: no    Toe location:  R great toe  Pain details:     Quality:  Unable to specify    Radiates to:  Does not radiate    Severity:  No pain    Onset quality:  Gradual    Duration:  2 days    Timing:  Constant    Progression:  Worsening  Chronicity:  Chronic  Dislocation: no    Foreign body present:  No foreign bodies  Tetanus status:  Unknown  Prior injury to area:  Yes  Relieved by:  Nothing  Worsened by:  Nothing  Ineffective treatments:  None tried  Associated symptoms: swelling    Associated symptoms: no back pain, no decreased ROM, no fatigue, no fever, no itching, no muscle weakness, no neck pain, no numbness, no stiffness and no tingling    Risk factors: no concern for non-accidental trauma, no frequent fractures, no known bone disorder, no obesity and no recent illness        Review of Systems   Constitutional: Negative.  Negative for fatigue and fever.   HENT: Negative.      Respiratory: Negative.     Cardiovascular: Negative.    Gastrointestinal: Negative.    Genitourinary: Negative.    Musculoskeletal: Negative.  Negative for back pain, neck pain and stiffness.   Skin: Negative.  Negative for itching.   Neurological: Negative.    Psychiatric/Behavioral: Negative.     All other systems reviewed and are negative.      Past Medical History:   Diagnosis Date    Arthritis     Asthma     Cancer     skin    Carotid disease, bilateral     Cellulitis     right foot - on antibiotics 6-    Chest pain     Chronic diastolic congestive heart failure 09/07/2019    Chronic sinusitis     Maribell bullosa     Coronary artery disease involving native coronary artery of native heart with unstable angina pectoris 01/17/2017    Deviated septum     Diabetes mellitus     Difficulty urinating     Diverticulitis     Enlarged prostate     Fatty liver     GERD (gastroesophageal reflux disease)     [place] (hard of hearing)     Does have hearing aids    Hyperlipidemia LDL goal <70 02/02/2017    Hypertrophy of nasal turbinates     Keratoderma     Kidney stone     Lung nodules     lower right lung    Migraine     Murmur, heart     Myocardial infarction     Obesity     Paroxysmal atrial fibrillation 07/11/2019    Personal history of COVID-19 07/2021    PONV (postoperative nausea and vomiting)     Primary hypertension 10/16/2016    Psoriasis     Seizures     Sinus congestion     Skin cancer     Sleep apnea     not using cpap    SOB (shortness of breath)     Stroke     mild weakness on right side    UTI (urinary tract infection)        Allergies   Allergen Reactions    Flagyl [Metronidazole] Hives    Atorvastatin Other (See Comments) and Myalgia      - LIPITOR -   Muscle cramps    Ciprofloxacin Hives      - CIPRO -        Past Surgical History:   Procedure Laterality Date    CARDIAC CATHETERIZATION  01/2016    Dr. Broadbent; widely patent previously placed stents in the left anterior descending and obstructive  disease involving the diagonal branch which was treated medically    CARDIAC CATHETERIZATION N/A 07/14/2017    Procedure: Left Heart Cath;  Surgeon: Wade Ramey MD;  Location:  [facility];  Service:     CARDIAC CATHETERIZATION Left 10/15/2018    Procedure: Cardiac Catheterization/Vascular Study;  Surgeon: Wade Ramey MD;  Location:  [facility];  Service: Cardiology    CARDIAC CATHETERIZATION  10/15/2018    Procedure: Functional Flow [place];  Surgeon: Wade Ramey MD;  Location:  [facility];  Service: Cardiology    CARDIAC CATHETERIZATION N/A 10/15/2018    Procedure: Left ventriculography;  Surgeon: Wade Ramey MD;  Location:  [facility];  Service: Cardiology    CARDIAC CATHETERIZATION Left 06/26/2019    Procedure: Cardiac Catheterization/Vascular Study VEL OK  HE WILL WAIT 1 YEAR FOR SHOULDER SURGERY ;  Surgeon: Wade Ramey MD;  Location:  [facility];  Service: Cardiology    CARDIAC CATHETERIZATION Left 04/30/2021    Procedure: Coronary angiography;  Surgeon: Sahil Llamas MD;  Location:  [facility];  Service: Cardiology;  Laterality: Left;    CARDIAC CATHETERIZATION N/A 04/30/2021    Procedure: Percutaneous Coronary Intervention;  Surgeon: Sahil Llamas MD;  Location:  [facility];  Service: Cardiology;  Laterality: N/A;    CARDIAC CATHETERIZATION N/A 11/09/2022    Procedure: Left Heart Cath with SVGs;  Surgeon: Wade Ramey MD;  Location:  [facility];  Service: Cardiology;  Laterality: N/A;    CARPAL TUNNEL RELEASE      January 2024 and pther hand February 2024    CHOLECYSTECTOMY      CHOLECYSTECTOMY WITH INTRAOPERATIVE CHOLANGIOGRAM N/A 08/01/2018    Procedure: CHOLECYSTECTOMY LAPAROSCOPIC INTRAOPERATIVE CHOLANGIOGRAM;  Surgeon: Shane Ann MD;  Location: [facility] OR;  Service: General    COLONOSCOPY N/A 07/14/2020    Procedure: COLONOSCOPY WITH  ANESTHESIA;  Surgeon: Anupam Morales DO;  Location:  [facility];  Service: Gastroenterology;  Laterality: N/A;  pre: abdominal pain  post: diverticulosis  Vinayak Correia PA    CORONARY ANGIOPLASTY      CORONARY ARTERY BYPASS GRAFT N/A 07/06/2019    Procedure: CABG X2 WITH LIMA, LEFT LEG OVH, AND PLACEMENT OF LEFT FEMORAL ARTERIAL LINE;  Surgeon: Steven Tang MD;  Location:  [facility] OR;  Service: Cardiothoracic    CORONARY STENT PLACEMENT      x 6    CYSTOSCOPY TRANSURETHRAL RESECTION OF PROSTATE N/A 01/23/2023    Procedure: CYSTOSCOPY TRANSURETHRAL RESECTION OF PROSTATE;  Surgeon: Latrell Pope MD;  Location:  [facility] OR;  Service: Urology;  Laterality: N/A;    ENDOSCOPIC FUNCTIONAL SINUS SURGERY (FESS) Bilateral 12/13/2017    Procedure: PROCEDURE PERFORMED:  Bilateral functional endoscopic anterior ethmoidectomy with bilateral middle meatal antrostomy Septoplasty Right kathia bullosa resection Bilateral inferior turbinate reduction via Coblation;  Surgeon: Mayank Ibarra MD;  Location:  [facility] OR;  Service:     ENDOSCOPY N/A 07/30/2018    Procedure: ESOPHAGOGASTRODUODENOSCOPY WITH ANESTHESIA;  Surgeon: Benitez Mas MD;  Location:  [facility];  Service: Gastroenterology    ENDOSCOPY N/A 07/14/2020    Procedure: ESOPHAGOGASTRODUODENOSCOPY WITH ANESTHESIA;  Surgeon: Anupam Morales DO;  Location:  [facility];  Service: Gastroenterology;  Laterality: N/A;  pre: abdominal pain  post: esophagitis  Vinayak Correia PA    HERNIA REPAIR      x2 inguinal area    KIDNEY STONE SURGERY      KNEE ARTHROSCOPY Right 03/01/2022    Procedure: RIGHT KNEE PARTIAL LATERAL MENISCECTOMY;  Surgeon: Pedro Pablo Song MD;  Location:  [facility] OR;  Service: Orthopedics;  Laterality: Right;    KNEE SURGERY Right     OTHER SURGICAL HISTORY      urolift    PROSTATE SURGERY      Dr. Badillo - 2017    PULMONARY ARTERY PRESSURE SENSOR IMPLANT N/A 05/08/2023    Procedure: PA Pressure Sensor Implant;   Surgeon: Wade Ramey MD;  Location:  [facility];  Service: Cardiology;  Laterality: N/A;    ROTATOR CUFF REPAIR Right     SLEEP ENDOSCOPY N/A 2023    Procedure: Videosleep endoscopy;  Surgeon: Mayank Ibarra MD;  Location:  [facility] OR;  Service: ENT;  Laterality: N/A;    THUMB AMPUTATION Left     partial    TOE SURGERY      great toe       Family History   Problem Relation Age of Onset    Heart disease Father     COPD Mother     Hypertension Mother     Asthma Mother     No Known Problems Sister     Colon cancer Paternal Uncle     Prostate cancer Maternal Grandfather     No Known Problems Sister     Colon cancer Maternal Grandmother     Colon polyps Maternal Grandmother        Social History     Socioeconomic History    Marital status:    Tobacco Use    Smoking status: Former     Current packs/day: 0.00     Average packs/day: 1.5 packs/day for 18.0 years (27.0 ttl pk-yrs)     Types: Cigarettes, Cigars     Start date:      Quit date:      Years since quittin.4     Passive exposure: Past    Smokeless tobacco: Former     Types: Chew     Quit date:    Vaping Use    Vaping status: Never Used   Substance and Sexual Activity    Alcohol use: No     Comment: 0    Drug use: No    Sexual activity: Defer       Prior to Admission medications    Medication Sig Start Date End Date Taking? Authorizing Provider   sulfamethoxazole-trimethoprim (BACTRIM,SEPTRA) 400-80 MG tablet Take 1 tablet by mouth Every 12 (Twelve) Hours. 24  Yes Rohan Christina MD   acetaminophen (TYLENOL) 325 MG tablet Take 1 tablet by mouth Every 6 (Six) Hours As Needed for Mild Pain.    Rohan Christina MD   albuterol (PROVENTIL HFA;VENTOLIN HFA) 108 (90 BASE) MCG/ACT inhaler Inhale 2 puffs Every 6 (Six) Hours As Needed for Wheezing.    Rohan Christina MD   apixaban (ELIQUIS) 5 MG tablet tablet Take 1 tablet by mouth 2 (Two) Times a Day.    Rohan Christina MD   Aspirin 81 MG  capsule Take 81 mg by mouth Daily. Dr. Ramey or Rohan Hanson MD   calcium polycarbophil (FIBERCON) 625 MG tablet Take 1 tablet by mouth Daily As Needed.    Rohan Christina MD   carboxymethylcellulose (REFRESH PLUS) 0.5 % solution Administer 1 drop to both eyes 4 (Four) Times a Day As Needed for Dry Eyes.    Rohan Christina MD   empagliflozin (JARDIANCE) 10 MG tablet tablet Take 1 tablet by mouth Daily.    Rohan Christina MD   ezetimibe (ZETIA) 10 MG tablet Take 1 tablet by mouth Daily.    Rohan Christina MD   fluticasone (FLONASE) 50 MCG/ACT nasal spray 2 sprays into the nostril(s) as directed by provider Daily.    Rohan Christina MD   furosemide (Lasix) 40 MG tablet Take 1 tablet by mouth Daily As Needed (take as needed for weight gain more than 2 pounds in a day or more than than 3 pounds in 2 days.). 4/19/23   Nick Carolina APRN   gabapentin (NEURONTIN) 300 MG capsule Take 1 capsule by mouth Every Night.    Rohan Christina MD   guaiFENesin (MUCINEX) 600 MG 12 hr tablet Take 2 tablets by mouth Daily As Needed for Congestion.    Rohan Christina MD   insulin aspart (novoLOG FLEXPEN) 100 UNIT/ML solution pen-injector sc pen Inject 40 Units under the skin into the appropriate area as directed 3 (Three) Times a Day With Meals. Can use 5 to 6 more units if needed in early evening.    Rohan Christina MD   INSULIN GLARGINE-YFGN SC Inject 100 Units under the skin into the appropriate area as directed Every Night.    Rohan Christina MD   INSULIN GLARGINE-YFGN SC Inject 80 Units under the skin into the appropriate area as directed Daily.    Rohan Christina MD   isosorbide mononitrate (IMDUR) 120 MG 24 hr tablet Take 1 tablet by mouth Daily. 1/11/22   Agnieszka Jeff APRN   lamoTRIgine (LaMICtal) 200 MG tablet Take 1 tablet by mouth 2 (Two) Times a Day. 8/22/23   Flako Freeman APRN   levETIRAcetam (KEPPRA) 500 MG tablet Take 3 tablets  by mouth Every Morning. 8/22/23   Flako Freeman APRN   levETIRAcetam (KEPPRA) 500 MG tablet Take 4 tablets by mouth Every Night. 8/22/23   Flako Freeman APRN   metFORMIN (GLUCOPHAGE) 1000 MG tablet Take 1 tablet by mouth 2 (Two) Times a Day With Meals.    Rohan Christina MD   metoprolol tartrate (LOPRESSOR) 100 MG tablet Take 1 tablet by mouth 2 (Two) Times a Day.    Rohan Christina MD   Multiple Vitamin (MULTI VITAMIN PO) Take 1 tablet by mouth Daily.    Rohan Christina MD   nitroglycerin (NITROSTAT) 0.4 MG SL tablet Place 1 tablet under the tongue Every 5 (Five) Minutes As Needed for Chest Pain. Take no more than 3 doses in 15 minutes. 4/4/24   Ben Burden APRN   pantoprazole (PROTONIX) 40 MG EC tablet Take 1 tablet by mouth 2 (Two) Times a Day.    Rohan Christina MD   polyethylene glycol (MIRALAX) 17 GM/SCOOP powder Take two scoops twice a day until bowel movements normalize 9/21/23   Dewayne Pandey MD   psyllium (METAMUCIL) 58.6 % packet Take 1 packet by mouth Daily As Needed (constipation).    Rohan Christina MD   Rimegepant Sulfate (Nurtec) 75 MG tablet dispersible tablet Take 1 tablet by mouth 3 (Three) Times a Day As Needed (migraine).    Rohan Christina MD   rosuvastatin (CRESTOR) 40 MG tablet Take 0.5 tablets by mouth Every Night.    Rohan Christina MD   sacubitril-valsartan (Entresto)  MG tablet Take 1 tablet by mouth 2 (Two) Times a Day.    Rohan Christina MD   Semaglutide,0.25 or 0.5MG/DOS, (Ozempic, 0.25 or 0.5 MG/DOSE,) 2 MG/1.5ML solution pen-injector Inject 0.5 mg under the skin into the appropriate area as directed 1 (One) Time Per Week. Monday    Rohan Christina MD   spironolactone (ALDACTONE) 50 MG tablet Take 1 tablet by mouth Daily.    Rohan Christina MD   tamsulosin (FLOMAX) 0.4 MG capsule 24 hr capsule Take 1 capsule by mouth Daily.    [place], MD Rohan   Vibegron (Gemtesa) 75 MG tablet Take 1  tablet by mouth Daily for 42 days. 6/6/24 7/18/24  Asif Perez PA       Medications   clindamycin (CLEOCIN) 600 mg in dextrose 5% 50 mL IVPB (premix) (0 mg Intravenous Stopped 6/23/24 0031)       Vitals:    06/23/24 0056   BP: 131/72   Pulse: 80   Resp: 16   Temp: 97.9 °F (36.6 °C)   SpO2: 95%         Objective   Physical Exam  Vitals and nursing note reviewed.   Constitutional:       Appearance: Normal appearance.   HENT:      Head: Normocephalic and atraumatic.   Eyes:      Extraocular Movements: Extraocular movements intact.      Pupils: Pupils are equal, round, and reactive to light.   Cardiovascular:      Rate and Rhythm: Normal rate and regular rhythm.   Pulmonary:      Effort: Pulmonary effort is normal.      Breath sounds: Normal breath sounds.   Abdominal:      General: Abdomen is flat.      Palpations: Abdomen is soft.      Tenderness: There is no abdominal tenderness.   Musculoskeletal:         General: Normal range of motion.      Cervical back: Normal range of motion and neck supple.      Comments: Patient does have an open wound on the bottom of the right great toe pad.  There is no purulence or drainage at that site.  It appears to be mostly dry.  However he does have some erythema across the top of the toe at the base of the nail and extending proximally toward the foot.  It does not go beyond the distal third of the foot.   Skin:     General: Skin is warm and dry.   Neurological:      General: No focal deficit present.      Mental Status: He is alert and oriented to person, place, and time.   Psychiatric:         Mood and Affect: Mood normal.         Behavior: Behavior normal.         Procedures         Lab Results (last 24 hours)       Procedure Component Value Units Date/Time    CBC & Differential [[id number]]  (Abnormal) Collected: 06/22/24 2252    Specimen: Blood Updated: 06/22/24 2302    Narrative:      The following orders were created for panel order CBC & Differential.  Procedure                                Abnormality         Status                     ---------                               -----------         ------                     CBC Auto Differential[[id number]]        Abnormal            Final result                 Please view results for these tests on the individual orders.    Comprehensive Metabolic Panel [[id number]]  (Abnormal) Collected: 06/22/24 2252    Specimen: Blood Updated: 06/22/24 2316     Glucose 171 mg/dL      BUN 20 mg/dL      Creatinine 1.40 mg/dL      Sodium 140 mmol/L      Potassium 4.1 mmol/L      Comment: Slight hemolysis detected by analyzer. Result may be falsely elevated.        Chloride 106 mmol/L      CO2 22.0 mmol/L      Calcium 9.3 mg/dL      Total Protein 6.8 g/dL      Albumin 4.1 g/dL      ALT (SGPT) 17 U/L      AST (SGOT) 16 U/L      Alkaline Phosphatase 74 U/L      Total Bilirubin 0.6 mg/dL      Globulin 2.7 gm/dL      A/G Ratio 1.5 g/dL      BUN/Creatinine Ratio 14.3     Anion Gap 12.0 mmol/L      eGFR 55.4 mL/min/1.73     Narrative:      GFR Normal >60  Chronic Kidney Disease <60  Kidney Failure <15      Lipase [[id number]]  (Normal) Collected: 06/22/24 2252    Specimen: Blood Updated: 06/22/24 2311     Lipase 15 U/L     CBC Auto Differential [[id number]]  (Abnormal) Collected: 06/22/24 2252    Specimen: Blood Updated: 06/22/24 2302     WBC 5.26 10*3/mm3      RBC 4.40 10*6/mm3      Hemoglobin 13.1 g/dL      Hematocrit 39.1 %      MCV 88.9 fL      MCH 29.8 pg      MCHC 33.5 g/dL      RDW 13.3 %      RDW-SD 42.8 fl      MPV 11.3 fL      Platelets 125 10*3/mm3      Neutrophil % 46.8 %      Lymphocyte % 39.2 %      Monocyte % 8.2 %      Eosinophil % 4.6 %      Basophil % 0.6 %      Immature Grans % 0.6 %      Neutrophils, Absolute 2.47 10*3/mm3      Lymphocytes, Absolute 2.06 10*3/mm3      Monocytes, Absolute 0.43 10*3/mm3      Eosinophils, Absolute 0.24 10*3/mm3      Basophils, Absolute 0.03 10*3/mm3      Immature Grans, Absolute 0.03 10*3/mm3      nRBC 0.0  /100 WBC     Blood Culture - Blood, Arm, Right [[id number]] Collected: 06/22/24 2252    Specimen: Blood from Arm, Right Updated: 06/22/24 2334    Lactic Acid, Plasma [[id number]]  (Normal) Collected: 06/22/24 2252    Specimen: Blood Updated: 06/22/24 2342     Lactate 1.1 mmol/L     Blood Culture - Blood, Arm, Right [[id number]] Collected: 06/22/24 2323    Specimen: Blood from Arm, Right Updated: 06/22/24 2334            XR Foot 3+ View Right    (Results Pending)       ED Course          MDM  Number of Diagnoses or Management Options  Cellulitis of great toe of right foot: new and requires workup  Diagnosis management comments: I told the patient I do think his toe was infected but he is only had 2 doses of the Bactrim because he did not take a dose tonight.  The amount of had enough time to do any good actually we will add clindamycin to make sure.  His white blood cell count is okay and his lactic acid is normal.  Will see if we get this under control without having to admit to the hospital.  He is discharged in stable condition.       Amount and/or Complexity of Data Reviewed  Clinical lab tests: ordered and reviewed  Tests in the radiology section of CPT®: ordered and reviewed    Risk of Complications, Morbidity, and/or Mortality  Presenting problems: moderate  Diagnostic procedures: moderate  Management options: moderate    Patient Progress  Patient progress: stable        Final diagnoses:   Cellulitis of great toe of right foot          Andriy Bo Jr., MD  06/23/24 9011

## 2024-06-24 LAB
BACTERIA SPEC AEROBE CULT: ABNORMAL
BACTERIA SPEC AEROBE CULT: ABNORMAL
GRAM STN SPEC: ABNORMAL

## 2024-06-26 LAB — BACTERIA SPEC ANAEROBE CULT: NORMAL

## 2024-06-27 ENCOUNTER — PREP FOR SURGERY (OUTPATIENT)
Dept: OTHER | Facility: HOSPITAL | Age: 64
End: 2024-06-27
Payer: MEDICARE

## 2024-06-27 ENCOUNTER — TELEPHONE (OUTPATIENT)
Age: 64
End: 2024-06-27
Payer: MEDICARE

## 2024-06-27 ENCOUNTER — OFFICE VISIT (OUTPATIENT)
Dept: WOUND CARE | Facility: HOSPITAL | Age: 64
End: 2024-06-27
Payer: MEDICARE

## 2024-06-27 DIAGNOSIS — E11.621 DIABETIC ULCER OF TOE OF RIGHT FOOT ASSOCIATED WITH TYPE 2 DIABETES MELLITUS, WITH FAT LAYER EXPOSED: ICD-10-CM

## 2024-06-27 DIAGNOSIS — M20.61 DEFORMITY, TOE ACQUIRED, RIGHT: ICD-10-CM

## 2024-06-27 DIAGNOSIS — L97.512 DIABETIC ULCER OF TOE OF RIGHT FOOT ASSOCIATED WITH TYPE 2 DIABETES MELLITUS, WITH FAT LAYER EXPOSED: ICD-10-CM

## 2024-06-27 DIAGNOSIS — L97.512 NON-PRESSURE CHRONIC ULCER OF OTHER PART OF RIGHT FOOT WITH FAT LAYER EXPOSED: Primary | ICD-10-CM

## 2024-06-27 LAB
BACTERIA SPEC AEROBE CULT: NORMAL
BACTERIA SPEC AEROBE CULT: NORMAL

## 2024-06-27 NOTE — H&P
[facility] - PODIATRY    Today's Date: 06/27/2024     Patient Name: Carlos A Boyer Jr.  MRN: [id number]  [facility]: [id number]  PCP: Vinayak Correia PA  Referring Provider: No ref. provider found    SUBJECTIVE   CC: Nonhealing right hallux ulceration    HPI: Carlos A Boyer Jr., a 64 y.o.male, a(n) established patient complaining of nonhealing ulceration . Patient has h/o arthritis, asthma, bilateral carotid disease, chronic diastolic congestive heart failure, CAD, diabetes mellitus, difficulty urinating, diverticulitis, fatty liver, GERD, hard of hearing, HLD, kidney stone, lung nodules (lower right lung), migraine, heart murmur, MI, obesity, paroxysmal A-fib, primary hypertension, psoriasis, seizures, sleep apnea (not using CPAP), and stroke (mild weakness on right side) .  Patient's last A1c was 6.8%.  Patient has had a chronic ulcer to the plantar medial aspect of his right great toe for several months without resolution.  He does have mild deformity of the toe which is a contributing factor to slow wound healing.  He has been undergoing treatment of abdomen care for several weeks while the wound has improved some, it has failed to heal.  Denies pain. Relates previous treatment(s) including local wound care, offloading . Denies any constitutional symptoms. No other pedal complaints at this time.    Past Medical History:   Diagnosis Date    Arthritis     Asthma     Cancer     skin    Carotid disease, bilateral     Cellulitis     right foot - on antibiotics 6-    Chest pain     Chronic diastolic congestive heart failure 09/07/2019    Chronic sinusitis     Maribell bullosa     Coronary artery disease involving native coronary artery of native heart with unstable angina pectoris 01/17/2017    Deviated septum     Diabetes mellitus     Difficulty urinating     Diverticulitis     Enlarged prostate     Fatty liver     GERD (gastroesophageal reflux disease)     [place] (hard of hearing)     Does have  hearing aids    Hyperlipidemia LDL goal <70 02/02/2017    Hypertrophy of nasal turbinates     Keratoderma     Kidney stone     Lung nodules     lower right lung    Migraine     Murmur, heart     Myocardial infarction     Obesity     Paroxysmal atrial fibrillation 07/11/2019    Personal history of COVID-19 07/2021    PONV (postoperative nausea and vomiting)     Primary hypertension 10/16/2016    Psoriasis     Seizures     Sinus congestion     Skin cancer     Sleep apnea     not using cpap    SOB (shortness of breath)     Stroke     mild weakness on right side    UTI (urinary tract infection)      Past Surgical History:   Procedure Laterality Date    CARDIAC CATHETERIZATION  01/2016    Dr. Broadbent; widely patent previously placed stents in the left anterior descending and obstructive disease involving the diagonal branch which was treated medically    CARDIAC CATHETERIZATION N/A 07/14/2017    Procedure: Left Heart Cath;  Surgeon: Wade Ramey MD;  Location:  [facility]ATH [facility];  Service:     CARDIAC CATHETERIZATION Left 10/15/2018    Procedure: Cardiac Catheterization/Vascular Study;  Surgeon: Wade Ramey MD;  Location:  [facility]ATH [facility];  Service: Cardiology    CARDIAC CATHETERIZATION  10/15/2018    Procedure: Functional Flow Otterbein;  Surgeon: Wade Ramey MD;  Location:  [facility]ATH [facility];  Service: Cardiology    CARDIAC CATHETERIZATION N/A 10/15/2018    Procedure: Left ventriculography;  Surgeon: Wade Ramey MD;  Location:  [facility];  Service: Cardiology    CARDIAC CATHETERIZATION Left 06/26/2019    Procedure: Cardiac Catheterization/Vascular Study VEL OK  HE WILL WAIT 1 YEAR FOR SHOULDER SURGERY ;  Surgeon: Wade Ramey MD;  Location:  [facility];  Service: Cardiology    CARDIAC CATHETERIZATION Left 04/30/2021    Procedure: Coronary angiography;  Surgeon: Sahil Llamas MD;  Location:  [facility];  Service:  Cardiology;  Laterality: Left;    CARDIAC CATHETERIZATION N/A 04/30/2021    Procedure: Percutaneous Coronary Intervention;  Surgeon: Sahil Llamas MD;  Location:  [facility];  Service: Cardiology;  Laterality: N/A;    CARDIAC CATHETERIZATION N/A 11/09/2022    Procedure: Left Heart Cath with SVGs;  Surgeon: Wade Ramey MD;  Location:  [facility];  Service: Cardiology;  Laterality: N/A;    CARPAL TUNNEL RELEASE      January 2024 and pther hand February 2024    CHOLECYSTECTOMY      CHOLECYSTECTOMY WITH INTRAOPERATIVE CHOLANGIOGRAM N/A 08/01/2018    Procedure: CHOLECYSTECTOMY LAPAROSCOPIC INTRAOPERATIVE CHOLANGIOGRAM;  Surgeon: Shane Ann MD;  Location:  [facility] OR;  Service: General    COLONOSCOPY N/A 07/14/2020    Procedure: COLONOSCOPY WITH ANESTHESIA;  Surgeon: Anupam Morales DO;  Location:  [facility] ENDOSCOPY;  Service: Gastroenterology;  Laterality: N/A;  pre: abdominal pain  post: diverticulosis  Vinayak Correia PA    CORONARY ANGIOPLASTY      CORONARY ARTERY BYPASS GRAFT N/A 07/06/2019    Procedure: CABG X2 WITH LIMA, LEFT LEG OVH, AND PLACEMENT OF LEFT FEMORAL ARTERIAL LINE;  Surgeon: Steven Tang MD;  Location:  [facility] OR;  Service: Cardiothoracic    CORONARY STENT PLACEMENT      x 6    CYSTOSCOPY TRANSURETHRAL RESECTION OF PROSTATE N/A 01/23/2023    Procedure: CYSTOSCOPY TRANSURETHRAL RESECTION OF PROSTATE;  Surgeon: Latrell Pope MD;  Location:  [facility] OR;  Service: Urology;  Laterality: N/A;    ENDOSCOPIC FUNCTIONAL SINUS SURGERY (FESS) Bilateral 12/13/2017    Procedure: PROCEDURE PERFORMED:  Bilateral functional endoscopic anterior ethmoidectomy with bilateral middle meatal antrostomy Septoplasty Right kathia bullosa resection Bilateral inferior turbinate reduction via Coblation;  Surgeon: Mayank Ibarra MD;  Location:  [facility] OR;  Service:     ENDOSCOPY N/A 07/30/2018    Procedure: ESOPHAGOGASTRODUODENOSCOPY WITH ANESTHESIA;  Surgeon:  Benitez Mas MD;  Location: [facility] ENDOSCOPY;  Service: Gastroenterology    ENDOSCOPY N/A 2020    Procedure: ESOPHAGOGASTRODUODENOSCOPY WITH ANESTHESIA;  Surgeon: Anupam Morales DO;  Location:  [facility] ENDOS[facility];  Service: Gastroenterology;  Laterality: N/A;  pre: abdominal pain  post: esophagitis  Vinayak Correia PA    HERNIA REPAIR      x2 inguinal area    KIDNEY STONE SURGERY      KNEE ARTHROSCOPY Right 2022    Procedure: RIGHT KNEE PARTIAL LATERAL MENISCECTOMY;  Surgeon: Pedro Pablo Song MD;  Location: [facility] OR;  Service: Orthopedics;  Laterality: Right;    KNEE SURGERY Right     OTHER SURGICAL HISTORY      urolift    PROSTATE SURGERY      Dr. Badillo -     PULMONARY ARTERY PRESSURE SENSOR IMPLANT N/A 2023    Procedure: PA Pressure Sensor Implant;  Surgeon: Wade Ramey MD;  Location: [facility] CATH INVASIVE LOCATION;  Service: Cardiology;  Laterality: N/A;    ROTATOR CUFF REPAIR Right     SLEEP ENDOSCOPY N/A 2023    Procedure: Videosleep endoscopy;  Surgeon: Mayank Ibarra MD;  Location: [facility] OR;  Service: ENT;  Laterality: N/A;    THUMB AMPUTATION Left     partial    TOE SURGERY      great toe     Family History   Problem Relation Age of Onset    Heart disease Father     COPD Mother     Hypertension Mother     Asthma Mother     No Known Problems Sister     Colon cancer Paternal Uncle     Prostate cancer Maternal Grandfather     No Known Problems Sister     Colon cancer Maternal Grandmother     Colon polyps Maternal Grandmother      Social History     Socioeconomic History    Marital status:    Tobacco Use    Smoking status: Former     Current packs/day: 0.00     Average packs/day: 1.5 packs/day for 18.0 years (27.0 ttl pk-yrs)     Types: Cigarettes, Cigars     Start date:      Quit date:      Years since quittin.5     Passive exposure: Past    Smokeless tobacco: Former     Types: Chew     Quit date:    Vaping Use    Vaping status: Never  Used   Substance and Sexual Activity    Alcohol use: No     Comment: 0    Drug use: No    Sexual activity: Defer     Allergies   Allergen Reactions    Flagyl [Metronidazole] Hives    Atorvastatin Other (See Comments) and Myalgia      - LIPITOR -   Muscle cramps    Ciprofloxacin Hives      - CIPRO -      Current Outpatient Medications   Medication Sig Dispense Refill    acetaminophen (TYLENOL) 325 MG tablet Take 1 tablet by mouth Every 6 (Six) Hours As Needed for Mild Pain.      albuterol (PROVENTIL HFA;VENTOLIN HFA) 108 (90 BASE) MCG/ACT inhaler Inhale 2 puffs Every 6 (Six) Hours As Needed for Wheezing.      apixaban (ELIQUIS) 5 MG tablet tablet Take 1 tablet by mouth 2 (Two) Times a Day.      Aspirin 81 MG capsule Take 81 mg by mouth Daily. Dr. Ramey or Dr. Tang      calcium polycarbophil (FIBERCON) 625 MG tablet Take 1 tablet by mouth Daily As Needed.      carboxymethylcellulose (REFRESH PLUS) 0.5 % solution Administer 1 drop to both eyes 4 (Four) Times a Day As Needed for Dry Eyes.      clindamycin (CLEOCIN) 300 MG capsule Take 1 capsule by mouth 4 (Four) Times a Day. 28 capsule 0    empagliflozin (JARDIANCE) 10 MG tablet tablet Take 1 tablet by mouth Daily.      ezetimibe (ZETIA) 10 MG tablet Take 1 tablet by mouth Daily.      fluticasone (FLONASE) 50 MCG/ACT nasal spray 2 sprays into the nostril(s) as directed by provider Daily.      furosemide (Lasix) 40 MG tablet Take 1 tablet by mouth Daily As Needed (take as needed for weight gain more than 2 pounds in a day or more than than 3 pounds in 2 days.). 90 tablet 1    gabapentin (NEURONTIN) 300 MG capsule Take 1 capsule by mouth Every Night.      guaiFENesin (MUCINEX) 600 MG 12 hr tablet Take 2 tablets by mouth Daily As Needed for Congestion.      insulin aspart (novoLOG FLEXPEN) 100 UNIT/ML solution pen-injector sc pen Inject 40 Units under the skin into the appropriate area as directed 3 (Three) Times a Day With Meals. Can use 5 to 6 more units if needed in  early evening.      INSULIN GLARGINE-YFGN SC Inject 100 Units under the skin into the appropriate area as directed Every Night.      INSULIN GLARGINE-YFGN SC Inject 80 Units under the skin into the appropriate area as directed Daily.      isosorbide mononitrate (IMDUR) 120 MG 24 hr tablet Take 1 tablet by mouth Daily. 90 tablet 3    lamoTRIgine (LaMICtal) 200 MG tablet Take 1 tablet by mouth 2 (Two) Times a Day. 180 tablet 1    levETIRAcetam (KEPPRA) 500 MG tablet Take 3 tablets by mouth Every Morning. 90 tablet 5    levETIRAcetam (KEPPRA) 500 MG tablet Take 4 tablets by mouth Every Night. 120 tablet 5    metFORMIN (GLUCOPHAGE) 1000 MG tablet Take 1 tablet by mouth 2 (Two) Times a Day With Meals.      metoprolol tartrate (LOPRESSOR) 100 MG tablet Take 1 tablet by mouth 2 (Two) Times a Day.      Multiple Vitamin (MULTI VITAMIN PO) Take 1 tablet by mouth Daily.      nitroglycerin (NITROSTAT) 0.4 MG SL tablet Place 1 tablet under the tongue Every 5 (Five) Minutes As Needed for Chest Pain. Take no more than 3 doses in 15 minutes. 25 tablet 2    pantoprazole (PROTONIX) 40 MG EC tablet Take 1 tablet by mouth 2 (Two) Times a Day.      polyethylene glycol (MIRALAX) 17 GM/SCOOP powder Take two scoops twice a day until bowel movements normalize 578 g 0    psyllium (METAMUCIL) 58.6 % packet Take 1 packet by mouth Daily As Needed (constipation).      Rimegepant Sulfate (Nurtec) 75 MG tablet dispersible tablet Take 1 tablet by mouth 3 (Three) Times a Day As Needed (migraine).      rosuvastatin (CRESTOR) 40 MG tablet Take 0.5 tablets by mouth Every Night.      sacubitril-valsartan (Entresto)  MG tablet Take 1 tablet by mouth 2 (Two) Times a Day.      Semaglutide,0.25 or 0.5MG/DOS, (Ozempic, 0.25 or 0.5 MG/DOSE,) 2 MG/1.5ML solution pen-injector Inject 0.5 mg under the skin into the appropriate area as directed 1 (One) Time Per Week. Monday      spironolactone (ALDACTONE) 50 MG tablet Take 1 tablet by mouth Daily.       sulfamethoxazole-trimethoprim (BACTRIM,SEPTRA) 400-80 MG tablet Take 1 tablet by mouth Every 12 (Twelve) Hours.      tamsulosin (FLOMAX) 0.4 MG capsule 24 hr capsule Take 1 capsule by mouth Daily.      Vibegron (Gemtesa) 75 MG tablet Take 1 tablet by mouth Daily for 42 days. 42 tablet 0     No current facility-administered medications for this visit.     Review of Systems   Constitutional:  Negative for chills and fever.   HENT:  Negative for congestion.    Respiratory:  Negative for shortness of breath.    Cardiovascular:  Negative for chest pain and leg swelling.   Gastrointestinal:  Negative for constipation, diarrhea, nausea and vomiting.   Musculoskeletal:         Foot pain   Skin:  Positive for wound.   Neurological:  Positive for numbness.       OBJECTIVE   There were no vitals filed for this visit.    PHYSICAL EXAM  GEN:   Accompanied by none.     Physical Exam  Vitals reviewed.   Constitutional:       Appearance: Normal appearance.   HENT:      Head: Normocephalic and atraumatic.      Right Ear: Tympanic membrane normal.      Left Ear: Tympanic membrane normal.      Nose: Nose normal.      Mouth/Throat:      Pharynx: Oropharynx is clear.   Eyes:      Extraocular Movements: Extraocular movements intact.      Pupils: Pupils are equal, round, and reactive to light.   Cardiovascular:      Rate and Rhythm: Normal rate and regular rhythm.      Pulses: Normal pulses.           Dorsalis pedis pulses are 2+ on the right side and 2+ on the left side.        Posterior tibial pulses are 2+ on the right side and 2+ on the left side.      Heart sounds: Normal heart sounds.   Pulmonary:      Effort: Pulmonary effort is normal.      Breath sounds: Normal breath sounds.   Abdominal:      General: Bowel sounds are normal.   Musculoskeletal:      Cervical back: Normal range of motion and neck supple.   Feet:      Right foot:      Protective Sensation:   10 sites sensed.      Skin integrity: Ulcer and warmth present. No  erythema.      Left foot:      Protective Sensation:   10 sites sensed.      Skin integrity: Warmth present.   Neurological:      General: No focal deficit present.      Mental Status: He is alert and oriented to person, place, and time.   Psychiatric:         Mood and Affect: Mood normal.         Behavior: Behavior normal.         Thought Content: Thought content normal.         Judgment: Judgment normal.          Foot/Ankle Exam    GENERAL  Appearance:  appears stated age  Orientation:  AAOx3  Affect:  appropriate  Gait:  unimpaired  Assistance:  independent  Right shoe gear: diabetic shoe  Left shoe gear: diabetic shoe    VASCULAR     Right Foot Vascularity   Dorsalis pedis:  2+  Posterior tibial:  2+  Skin temperature:  warm  Edema grading:  None  CFT:  3  Pedal hair growth:  Present  Varicosities:  none     Left Foot Vascularity   Dorsalis pedis:  2+  Posterior tibial:  2+  Skin temperature:  warm  Edema grading:  None  CFT:  3  Pedal hair growth:  Present  Varicosities:  none     NEUROLOGIC     Right Foot Neurologic   Light touch sensation: diminished  Vibratory sensation: diminished  Hot/Cold sensation: diminished  Protective Sensation using [place]-Ras Monofilament:  10     Left Foot Neurologic   Light touch sensation: diminished  Vibratory sensation: diminished  Hot/Cold sensation:  diminished  Protective Sensation using Bel[place]-Ras Monofilament:  10    MUSCULOSKELETAL     Right Foot Musculoskeletal   Ecchymosis:  none  Tenderness:  none    Arch:  Normal  Mallet toe:  First toe     Left Foot Musculoskeletal   Ecchymosis:  none  Tenderness:  none  Arch:  Normal    MUSCLE STRENGTH     Right Foot Muscle Strength   Foot dorsiflexion:  5  Foot plantar flexion:  5  Foot inversion:  5  Foot eversion:  5     Left Foot Muscle Strength   Foot dorsiflexion:  5  Foot plantar flexion:  5  Foot inversion:  5  Foot eversion:  5    RANGE OF MOTION     Right Foot Range of Motion   Foot and ankle ROM within normal  limits    1st MTP extension:  decreased   1st MTP flexion:  decreased   1st IP extension:  decreased   1st IP flexion:  decreased      Left Foot Range of Motion   Foot and ankle ROM within normal limits      DERMATOLOGIC      Right Foot Dermatologic   Skin  Positive for ulcer. Negative for cellulitis and erythema.      Left Foot Dermatologic   Skin  Left foot skin is intact.      Right foot additional comments: Moderate periwound hyperkeratotic tissue. Light overlying slough. Wound base is mostly granular after debridement. Resolving signs infection. No probe to bone.           RADIOLOGY/NUCLEAR:  XR Foot 3+ View Right    Result Date: 6/23/2024  Narrative: XR FOOT 3+ VW RIGHT-  HISTORY: swelling and redness in great toe  COMPARISON: 4/28/2024  FINDINGS: 3 views of the right foot are obtained.  Advanced arthritic changes of the first MTP joint moderate arthritic change of the interphalangeal joint of the great toe. Soft tissue swelling but no radiographic evidence of osteomyelitis. Moderate calcaneal spurring. Mild arthritic change midfoot. No radiopaque foreign body seen within the soft tissues.      Impression: Impression: 1. Soft tissue swelling of the great toe with no radiographic evidence of acute osteomyelitis. Advanced arthritic changes of the first MTP joint with at least moderate arthritic change of the interphalangeal joint of the great toe. Calcaneal spurring.   This report was signed and finalized on 6/23/2024 9:43 AM by Dr. Ml Estrada MD.       LABORATORY/CULTURE RESULTS:      PATHOLOGY RESULTS:       ASSESSMENT/PLAN     Diagnoses and all orders for this visit:    1. Non-pressure chronic ulcer of other part of right foot with fat layer exposed (Primary)  -     ethyl alcohol 62 % 2 each  -     Case Request; Standing  -     CBC & Differential; Future  -     Basic Metabolic Panel; Future  -     ECG 12 Lead; Future  -     XR chest 2 vw; Future  -     ceFAZolin 2000 mg IVPB in 100 mL NS (MBP)  -      Case Request    2. Diabetic ulcer of toe of right foot associated with type 2 diabetes mellitus, with fat layer exposed  -     ethyl alcohol 62 % 2 each  -     Case Request; Standing  -     CBC & Differential; Future  -     Basic Metabolic Panel; Future  -     ECG 12 Lead; Future  -     XR chest 2 vw; Future  -     ceFAZolin 2000 mg IVPB in 100 mL NS (MBP)  -     Case Request    3. Deformity, toe acquired, right  -     ethyl alcohol 62 % 2 each  -     Case Request; Standing  -     CBC & Differential; Future  -     Basic Metabolic Panel; Future  -     ECG 12 Lead; Future  -     XR chest 2 vw; Future  -     ceFAZolin 2000 mg IVPB in 100 mL NS (MBP)  -     Case Request    Other orders  -     Follow Anesthesia Guidelines / Protocol; Future  -     Follow Anesthesia Guidelines / Protocol; Standing  -     Obtain Informed Consent; Future  -     Provide Instructions to Patient Regarding NPO Status; Future  -     Chlorhexidine Skin Prep - Educate and Review With Patient; Future  -     Verify NPO Status; Standing  -     Obtain Informed Consent (If Not Done Inpatient or PAT); Standing  -     Instructions on coughing, deep breathing, and incentive spirometry.; Standing  -     Notify Provider - Standard; Standing  -     Provide NPO Instructions to Patient      Comprehensive lower extremity examination and evaluation was performed.  Discussed findings and treatment plan including risks, benefits, and treatment options with patient in detail. Patient agreed with treatment plan.  [facility] at [facility]      Reviewed previous x-rays.  Discussed ongoing conservative versus surgical options to assist with wound healing.  Patient has opted for surgery.  I believe the best course of action would be to proceed with a right foot hallux interphalangeal joint arthrodesis and possible bone graft harvest. Patient is in agreement.  All options, benefits, and risks associate with surgery have been discussed with the patient including but not  limited to: Standard risk of anesthesia, pain, bleeding, infection, nonhealing/dehiscence, nonunion, malunion, deformity, loss of limb or life.  Pre and postoperative course were discussed in detail.  No guarantees were inferred.    All questions were answered to patient/family satisfaction. Should symptoms fail to improve or worsen they agree to call or return to clinic or to go to the Emergency Department. Discussed the importance of following up with any needed screening tests/labs/specialist appointments and any requested follow-up recommended by me today. Importance of maintaining follow-up discussed and patient accepts that missed appointments can delay diagnosis and potentially lead to worsening of conditions.  No follow-ups on file., or sooner if acute issues arise.      This document has been electronically signed by Hernan Villa DPM on June 27, 2024 09:29 CDT

## 2024-06-27 NOTE — H&P (VIEW-ONLY)
[facility] - PODIATRY    Today's Date: 06/27/2024     Patient Name: Carlos A Boyer Jr.  MRN: [id number]  [facility]: [id number]  PCP: Vinayak Correia PA  Referring Provider: No ref. provider found    SUBJECTIVE   CC: Nonhealing right hallux ulceration    HPI: Carlos A Boyer Jr., a 64 y.o.male, a(n) established patient complaining of nonhealing ulceration . Patient has h/o arthritis, asthma, bilateral carotid disease, chronic diastolic congestive heart failure, CAD, diabetes mellitus, difficulty urinating, diverticulitis, fatty liver, GERD, hard of hearing, HLD, kidney stone, lung nodules (lower right lung), migraine, heart murmur, MI, obesity, paroxysmal A-fib, primary hypertension, psoriasis, seizures, sleep apnea (not using CPAP), and stroke (mild weakness on right side) .  Patient's last A1c was 6.8%.  Patient has had a chronic ulcer to the plantar medial aspect of his right great toe for several months without resolution.  He does have mild deformity of the toe which is a contributing factor to slow wound healing.  He has been undergoing treatment of abdomen care for several weeks while the wound has improved some, it has failed to heal.  Denies pain. Relates previous treatment(s) including local wound care, offloading . Denies any constitutional symptoms. No other pedal complaints at this time.    Past Medical History:   Diagnosis Date    Arthritis     Asthma     Cancer     skin    Carotid disease, bilateral     Cellulitis     right foot - on antibiotics 6-    Chest pain     Chronic diastolic congestive heart failure 09/07/2019    Chronic sinusitis     [place]ib[place] bullosa     Coronary artery disease involving native coronary artery of native heart with unstable angina pectoris 01/17/2017    Deviated septum     Diabetes mellitus     Difficulty urinating     Diverticulitis     Enlarged prostate     Fatty liver     GERD (gastroesophageal reflux disease)     [place] (hard of hearing)     Does have  hearing aids    Hyperlipidemia LDL goal <70 02/02/2017    Hypertrophy of nasal turbinates     Keratoderma     Kidney stone     Lung nodules     lower right lung    Migraine     Murmur, heart     Myocardial infarction     Obesity     Paroxysmal atrial fibrillation 07/11/2019    Personal history of COVID-19 07/2021    PONV (postoperative nausea and vomiting)     Primary hypertension 10/16/2016    Psoriasis     Seizures     Sinus congestion     Skin cancer     Sleep apnea     not using cpap    SOB (shortness of breath)     Stroke     mild weakness on right side    UTI (urinary tract infection)      Past Surgical History:   Procedure Laterality Date    CARDIAC CATHETERIZATION  01/2016    Dr. Broadbent; widely patent previously placed stents in the left anterior descending and obstructive disease involving the diagonal branch which was treated medically    CARDIAC CATHETERIZATION N/A 07/14/2017    Procedure: Left Heart Cath;  Surgeon: Wade Ramey MD;  Location:  [facility] CATH [facility]VASIVE LOC[facility];  Service:     CARDIAC CATHETERIZATION Left 10/15/2018    Procedure: Cardiac Catheterization/Vascular Study;  Surgeon: Wade Ramey MD;  Location:  [facility] CATH [facility];  Service: Cardiology    CARDIAC CATHETERIZATION  10/15/2018    Procedure: Functional Flow [place];  Surgeon: Wade Ramey MD;  Location:  [facility] CATH [facility];  Service: Cardiology    CARDIAC CATHETERIZATION N/A 10/15/2018    Procedure: Left ventriculography;  Surgeon: Wade Ramey MD;  Location:  [facility] CATH [facility];  Service: Cardiology    CARDIAC CATHETERIZATION Left 06/26/2019    Procedure: Cardiac Catheterization/Vascular Study VEL OK  HE WILL WAIT 1 YEAR FOR SHOULDER SURGERY ;  Surgeon: Wade Ramey MD;  Location:  [facility];  Service: Cardiology    CARDIAC CATHETERIZATION Left 04/30/2021    Procedure: Coronary angiography;  Surgeon: Sahil Llamas MD;  Location:  [facility] CATH [facility];  Service:  Cardiology;  Laterality: Left;    CARDIAC CATHETERIZATION N/A 04/30/2021    Procedure: Percutaneous Coronary Intervention;  Surgeon: Sahil Llamas MD;  Location:  [facility] CATH [facility];  Service: Cardiology;  Laterality: N/A;    CARDIAC CATHETERIZATION N/A 11/09/2022    Procedure: Left Heart Cath with SVGs;  Surgeon: Wade Ramey MD;  Location:  [facility] CATH [facility];  Service: Cardiology;  Laterality: N/A;    CARPAL TUNNEL RELEASE      January 2024 and pther hand February 2024    CHOLECYSTECTOMY      CHOLECYSTECTOMY WITH INTRAOPERATIVE CHOLANGIOGRAM N/A 08/01/2018    Procedure: CHOLECYSTECTOMY LAPAROSCOPIC INTRAOPERATIVE CHOLANGIOGRAM;  Surgeon: Shane Ann MD;  Location:  [facility] OR;  Service: General    COLONOSCOPY N/A 07/14/2020    Procedure: COLONOSCOPY WITH ANESTHESIA;  Surgeon: Anupam Morales DO;  Location:  [facility] ENDOSCOPY;  Service: Gastroenterology;  Laterality: N/A;  pre: abdominal pain  post: diverticulosis  Vinayak Correia PA    CORONARY ANGIOPLASTY      CORONARY ARTERY BYPASS GRAFT N/A 07/06/2019    Procedure: CABG X2 WITH LIMA, LEFT LEG OVH, AND PLACEMENT OF LEFT FEMORAL ARTERIAL LINE;  Surgeon: Steven Tang MD;  Location:  [facility] OR;  Service: Cardiothoracic    CORONARY STENT PLACEMENT      x 6    CYSTOSCOPY TRANSURETHRAL RESECTION OF PROSTATE N/A 01/23/2023    Procedure: CYSTOSCOPY TRANSURETHRAL RESECTION OF PROSTATE;  Surgeon: Latrell Pope MD;  Location:  [facility] OR;  Service: Urology;  Laterality: N/A;    ENDOSCOPIC FUNCTIONAL SINUS SURGERY (FESS) Bilateral 12/13/2017    Procedure: PROCEDURE PERFORMED:  Bilateral functional endoscopic anterior ethmoidectomy with bilateral middle meatal antrostomy Septoplasty Right kathia bullosa resection Bilateral inferior turbinate reduction via [place];  Surgeon: Mayank Ibarra MD;  Location:  [facility] OR;  Service:     ENDOSCOPY N/A 07/30/2018    Procedure: ESOPHAGOGASTRODUODENOSCOPY WITH ANESTHESIA;  Surgeon:  Benitez Mas MD;  Location: [facility] ENDOSCOPY;  Service: Gastroenterology    ENDOSCOPY N/A 2020    Procedure: ESOPHAGOGASTRODUODENOSCOPY WITH ANESTHESIA;  Surgeon: Anupam Morales DO;  Location:  [facility] ENDOSCOPY;  Service: Gastroenterology;  Laterality: N/A;  pre: abdominal pain  post: esophagitis  Vinayak Correia PA    HERNIA REPAIR      x2 inguinal area    KIDNEY STONE SURGERY      KNEE ARTHROSCOPY Right 2022    Procedure: RIGHT KNEE PARTIAL LATERAL MENISCECTOMY;  Surgeon: Pedro Pablo Song MD;  Location: [facility] OR;  Service: Orthopedics;  Laterality: Right;    KNEE SURGERY Right     OTHER SURGICAL HISTORY      urolift    PROSTATE SURGERY      Dr. Badillo -     PULMONARY ARTERY PRESSURE SENSOR IMPLANT N/A 2023    Procedure: PA Pressure Sensor Implant;  Surgeon: Wade Ramey MD;  Location: [facility] CATH INVASIVE LOCATION;  Service: Cardiology;  Laterality: N/A;    ROTATOR CUFF REPAIR Right     SLEEP ENDOSCOPY N/A 2023    Procedure: Videosleep endoscopy;  Surgeon: Mayank Ibarra MD;  Location: [facility] OR;  Service: ENT;  Laterality: N/A;    THUMB AMPUTATION Left     partial    TOE SURGERY      great toe     Family History   Problem Relation Age of Onset    Heart disease Father     COPD Mother     Hypertension Mother     Asthma Mother     No Known Problems Sister     Colon cancer Paternal Uncle     Prostate cancer Maternal Grandfather     No Known Problems Sister     Colon cancer Maternal Grandmother     Colon polyps Maternal Grandmother      Social History     Socioeconomic History    Marital status:    Tobacco Use    Smoking status: Former     Current packs/day: 0.00     Average packs/day: 1.5 packs/day for 18.0 years (27.0 ttl pk-yrs)     Types: Cigarettes, Cigars     Start date:      Quit date:      Years since quittin.5     Passive exposure: Past    Smokeless tobacco: Former     Types: Chew     Quit date:    Vaping Use    Vaping status: Never  Used   Substance and Sexual Activity    Alcohol use: No     Comment: 0    Drug use: No    Sexual activity: Defer     Allergies   Allergen Reactions    Flagyl [Metronidazole] Hives    Atorvastatin Other (See Comments) and Myalgia      - LIPITOR -   Muscle cramps    Ciprofloxacin Hives      - CIPRO -      Current Outpatient Medications   Medication Sig Dispense Refill    acetaminophen (TYLENOL) 325 MG tablet Take 1 tablet by mouth Every 6 (Six) Hours As Needed for Mild Pain.      albuterol (PROVENTIL HFA;VENTOLIN HFA) 108 (90 BASE) MCG/ACT inhaler Inhale 2 puffs Every 6 (Six) Hours As Needed for Wheezing.      apixaban (ELIQUIS) 5 MG tablet tablet Take 1 tablet by mouth 2 (Two) Times a Day.      Aspirin 81 MG capsule Take 81 mg by mouth Daily. Dr. Ramey or Dr. Tang      calcium polycarbophil (FIBERCON) 625 MG tablet Take 1 tablet by mouth Daily As Needed.      carboxymethylcellulose (REFRESH PLUS) 0.5 % solution Administer 1 drop to both eyes 4 (Four) Times a Day As Needed for Dry Eyes.      clindamycin (CLEOCIN) 300 MG capsule Take 1 capsule by mouth 4 (Four) Times a Day. 28 capsule 0    empagliflozin (JARDIANCE) 10 MG tablet tablet Take 1 tablet by mouth Daily.      ezetimibe (ZETIA) 10 MG tablet Take 1 tablet by mouth Daily.      fluticasone (FLONASE) 50 MCG/ACT nasal spray 2 sprays into the nostril(s) as directed by provider Daily.      furosemide (Lasix) 40 MG tablet Take 1 tablet by mouth Daily As Needed (take as needed for weight gain more than 2 pounds in a day or more than than 3 pounds in 2 days.). 90 tablet 1    gabapentin (NEURONTIN) 300 MG capsule Take 1 capsule by mouth Every Night.      guaiFENesin (MUCINEX) 600 MG 12 hr tablet Take 2 tablets by mouth Daily As Needed for Congestion.      insulin aspart (novoLOG FLEXPEN) 100 UNIT/ML solution pen-injector sc pen Inject 40 Units under the skin into the appropriate area as directed 3 (Three) Times a Day With Meals. Can use 5 to 6 more units if needed in  early evening.      INSULIN GLARGINE-YFGN SC Inject 100 Units under the skin into the appropriate area as directed Every Night.      INSULIN GLARGINE-YFGN SC Inject 80 Units under the skin into the appropriate area as directed Daily.      isosorbide mononitrate (IMDUR) 120 MG 24 hr tablet Take 1 tablet by mouth Daily. 90 tablet 3    lamoTRIgine (LaMICtal) 200 MG tablet Take 1 tablet by mouth 2 (Two) Times a Day. 180 tablet 1    levETIRAcetam (KEPPRA) 500 MG tablet Take 3 tablets by mouth Every Morning. 90 tablet 5    levETIRAcetam (KEPPRA) 500 MG tablet Take 4 tablets by mouth Every Night. 120 tablet 5    metFORMIN (GLUCOPHAGE) 1000 MG tablet Take 1 tablet by mouth 2 (Two) Times a Day With Meals.      metoprolol tartrate (LOPRESSOR) 100 MG tablet Take 1 tablet by mouth 2 (Two) Times a Day.      Multiple Vitamin (MULTI VITAMIN PO) Take 1 tablet by mouth Daily.      nitroglycerin (NITROSTAT) 0.4 MG SL tablet Place 1 tablet under the tongue Every 5 (Five) Minutes As Needed for Chest Pain. Take no more than 3 doses in 15 minutes. 25 tablet 2    pantoprazole (PROTONIX) 40 MG EC tablet Take 1 tablet by mouth 2 (Two) Times a Day.      polyethylene glycol (MIRALAX) 17 GM/SCOOP powder Take two scoops twice a day until bowel movements normalize 578 g 0    psyllium (METAMUCIL) 58.6 % packet Take 1 packet by mouth Daily As Needed (constipation).      Rimegepant Sulfate (Nurtec) 75 MG tablet dispersible tablet Take 1 tablet by mouth 3 (Three) Times a Day As Needed (migraine).      rosuvastatin (CRESTOR) 40 MG tablet Take 0.5 tablets by mouth Every Night.      sacubitril-valsartan (Entresto)  MG tablet Take 1 tablet by mouth 2 (Two) Times a Day.      Semaglutide,0.25 or 0.5MG/DOS, (Ozempic, 0.25 or 0.5 MG/DOSE,) 2 MG/1.5ML solution pen-injector Inject 0.5 mg under the skin into the appropriate area as directed 1 (One) Time Per Week. Monday      spironolactone (ALDACTONE) 50 MG tablet Take 1 tablet by mouth Daily.       sulfamethoxazole-trimethoprim (BACTRIM,SEPTRA) 400-80 MG tablet Take 1 tablet by mouth Every 12 (Twelve) Hours.      tamsulosin (FLOMAX) 0.4 MG capsule 24 hr capsule Take 1 capsule by mouth Daily.      Vibegron (Gemtesa) 75 MG tablet Take 1 tablet by mouth Daily for 42 days. 42 tablet 0     No current facility-administered medications for this visit.     Review of Systems   Constitutional:  Negative for chills and fever.   HENT:  Negative for congestion.    Respiratory:  Negative for shortness of breath.    Cardiovascular:  Negative for chest pain and leg swelling.   Gastrointestinal:  Negative for constipation, diarrhea, nausea and vomiting.   Musculoskeletal:         Foot pain   Skin:  Positive for wound.   Neurological:  Positive for numbness.       OBJECTIVE   There were no vitals filed for this visit.    PHYSICAL EXAM  GEN:   Accompanied by none.     Physical Exam  Vitals reviewed.   Constitutional:       Appearance: Normal appearance.   HENT:      Head: Normocephalic and atraumatic.      Right Ear: Tympanic membrane normal.      Left Ear: Tympanic membrane normal.      Nose: Nose normal.      Mouth/Throat:      Pharynx: Oropharynx is clear.   Eyes:      Extraocular Movements: Extraocular movements intact.      Pupils: Pupils are equal, round, and reactive to light.   Cardiovascular:      Rate and Rhythm: Normal rate and regular rhythm.      Pulses: Normal pulses.           Dorsalis pedis pulses are 2+ on the right side and 2+ on the left side.        Posterior tibial pulses are 2+ on the right side and 2+ on the left side.      Heart sounds: Normal heart sounds.   Pulmonary:      Effort: Pulmonary effort is normal.      Breath sounds: Normal breath sounds.   Abdominal:      General: Bowel sounds are normal.   Musculoskeletal:      Cervical back: Normal range of motion and neck supple.   Feet:      Right foot:      Protective Sensation:   10 sites sensed.      Skin integrity: Ulcer and warmth present. No  erythema.      Left foot:      Protective Sensation:   10 sites sensed.      Skin integrity: Warmth present.   Neurological:      General: No focal deficit present.      Mental Status: He is alert and oriented to person, place, and time.   Psychiatric:         Mood and Affect: Mood normal.         Behavior: Behavior normal.         Thought Content: Thought content normal.         Judgment: Judgment normal.          Foot/Ankle Exam    GENERAL  Appearance:  appears stated age  Orientation:  AAOx3  Affect:  appropriate  Gait:  unimpaired  Assistance:  independent  Right shoe gear: diabetic shoe  Left shoe gear: diabetic shoe    VASCULAR     Right Foot Vascularity   Dorsalis pedis:  2+  Posterior tibial:  2+  Skin temperature:  warm  Edema grading:  None  CFT:  3  Pedal hair growth:  Present  Varicosities:  none     Left Foot Vascularity   Dorsalis pedis:  2+  Posterior tibial:  2+  Skin temperature:  warm  Edema grading:  None  CFT:  3  Pedal hair growth:  Present  Varicosities:  none     NEUROLOGIC     Right Foot Neurologic   Light touch sensation: diminished  Vibratory sensation: diminished  Hot/Cold sensation: diminished  Protective Sensation using [place] Monofilament:  10     Left Foot Neurologic   Light touch sensation: diminished  Vibratory sensation: diminished  Hot/Cold sensation:  diminished  Protective Sensation using [place] Monofilament:  10    MUSCULOSKELETAL     Right Foot Musculoskeletal   Ecchymosis:  none  Tenderness:  none    Arch:  Normal  Mallet toe:  First toe     Left Foot Musculoskeletal   Ecchymosis:  none  Tenderness:  none  Arch:  Normal    MUSCLE STRENGTH     Right Foot Muscle Strength   Foot dorsiflexion:  5  Foot plantar flexion:  5  Foot inversion:  5  Foot eversion:  5     Left Foot Muscle Strength   Foot dorsiflexion:  5  Foot plantar flexion:  5  Foot inversion:  5  Foot eversion:  5    RANGE OF MOTION     Right Foot Range of Motion   Foot and ankle ROM within normal  limits    1st MTP extension:  decreased   1st MTP flexion:  decreased   1st IP extension:  decreased   1st IP flexion:  decreased      Left Foot Range of Motion   Foot and ankle ROM within normal limits      DERMATOLOGIC      Right Foot Dermatologic   Skin  Positive for ulcer. Negative for cellulitis and erythema.      Left Foot Dermatologic   Skin  Left foot skin is intact.      Right foot additional comments: Moderate periwound hyperkeratotic tissue. Light overlying slough. Wound base is mostly granular after debridement. Resolving signs infection. No probe to bone.           RADIOLOGY/NUCLEAR:  XR Foot 3+ View Right    Result Date: 6/23/2024  Narrative: XR FOOT 3+ VW RIGHT-  HISTORY: swelling and redness in great toe  COMPARISON: 4/28/2024  FINDINGS: 3 views of the right foot are obtained.  Advanced arthritic changes of the first MTP joint moderate arthritic change of the interphalangeal joint of the great toe. Soft tissue swelling but no radiographic evidence of osteomyelitis. Moderate calcaneal spurring. Mild arthritic change midfoot. No radiopaque foreign body seen within the soft tissues.      Impression: Impression: 1. Soft tissue swelling of the great toe with no radiographic evidence of acute osteomyelitis. Advanced arthritic changes of the first MTP joint with at least moderate arthritic change of the interphalangeal joint of the great toe. Calcaneal spurring.   This report was signed and finalized on 6/23/2024 9:43 AM by Dr. Ml Estrada MD.       LABORATORY/CULTURE RESULTS:      PATHOLOGY RESULTS:       ASSESSMENT/PLAN     Diagnoses and all orders for this visit:    1. Non-pressure chronic ulcer of other part of right foot with fat layer exposed (Primary)  -     ethyl alcohol 62 % 2 each  -     Case Request; Standing  -     CBC & Differential; Future  -     Basic Metabolic Panel; Future  -     ECG 12 Lead; Future  -     XR chest 2 vw; Future  -     ceFAZolin 2000 mg IVPB in 100 mL NS (MBP)  -      Case Request    2. Diabetic ulcer of toe of right foot associated with type 2 diabetes mellitus, with fat layer exposed  -     ethyl alcohol 62 % 2 each  -     Case Request; Standing  -     CBC & Differential; Future  -     Basic Metabolic Panel; Future  -     ECG 12 Lead; Future  -     XR chest 2 vw; Future  -     ceFAZolin 2000 mg IVPB in 100 mL NS (MBP)  -     Case Request    3. Deformity, toe acquired, right  -     ethyl alcohol 62 % 2 each  -     Case Request; Standing  -     CBC & Differential; Future  -     Basic Metabolic Panel; Future  -     ECG 12 Lead; Future  -     XR chest 2 vw; Future  -     ceFAZolin 2000 mg IVPB in 100 mL NS (MBP)  -     Case Request    Other orders  -     Follow Anesthesia Guidelines / Protocol; Future  -     Follow Anesthesia Guidelines / Protocol; Standing  -     Obtain Informed Consent; Future  -     Provide Instructions to Patient Regarding NPO Status; Future  -     Chlorhexidine Skin Prep - Educate and Review With Patient; Future  -     Verify NPO Status; Standing  -     Obtain Informed Consent (If Not Done Inpatient or PAT); Standing  -     Instructions on coughing, deep breathing, and incentive spirometry.; Standing  -     Notify Provider - Standard; Standing  -     Provide NPO Instructions to Patient      Comprehensive lower extremity examination and evaluation was performed.  Discussed findings and treatment plan including risks, benefits, and treatment options with patient in detail. Patient agreed with treatment plan.  Continue treatment at [facility]      Reviewed previous x-rays.  Discussed ongoing conservative versus surgical options to assist with wound healing.  Patient has opted for surgery.  I believe the best course of action would be to proceed with a right foot hallux interphalangeal joint arthrodesis and possible bone graft harvest. Patient is in agreement.  All options, benefits, and risks associate with surgery have been discussed with the patient including but not  limited to: Standard risk of anesthesia, pain, bleeding, infection, nonhealing/dehiscence, nonunion, malunion, deformity, loss of limb or life.  Pre and postoperative course were discussed in detail.  No guarantees were inferred.    All questions were answered to patient/family satisfaction. Should symptoms fail to improve or worsen they agree to call or return to clinic or to go to the Emergency Department. Discussed the importance of following up with any needed screening tests/labs/specialist appointments and any requested follow-up recommended by me today. Importance of maintaining follow-up discussed and patient accepts that missed appointments can delay diagnosis and potentially lead to worsening of conditions.  No follow-ups on file., or sooner if acute issues arise.      This document has been electronically signed by Hernan Villa DPM on June 27, 2024 09:29 CDT

## 2024-06-27 NOTE — TELEPHONE ENCOUNTER
Called patient and went over surgery information date time and instructions were given. Patient is aware that he can not take his ozempic on Monday July 1st he also states Dr. Ramey told him he could hold his blood thinners for 5 days.

## 2024-07-01 ENCOUNTER — HOSPITAL ENCOUNTER (OUTPATIENT)
Dept: GENERAL RADIOLOGY | Facility: HOSPITAL | Age: 64
Discharge: HOME | End: 2024-07-01
Payer: MEDICARE

## 2024-07-01 ENCOUNTER — PRE-ADMISSION TESTING (OUTPATIENT)
Dept: PREADMISSION TESTING | Facility: HOSPITAL | Age: 64
End: 2024-07-01
Payer: MEDICARE

## 2024-07-01 VITALS
DIASTOLIC BLOOD PRESSURE: 65 MMHG | HEIGHT: 71 IN | SYSTOLIC BLOOD PRESSURE: 124 MMHG | BODY MASS INDEX: 36.42 KG/M2 | WEIGHT: 260.14 LBS | RESPIRATION RATE: 18 BRPM | OXYGEN SATURATION: 95 % | HEART RATE: 71 BPM

## 2024-07-01 DIAGNOSIS — E11.621 DIABETIC ULCER OF TOE OF RIGHT FOOT ASSOCIATED WITH TYPE 2 DIABETES MELLITUS, WITH FAT LAYER EXPOSED: ICD-10-CM

## 2024-07-01 DIAGNOSIS — L97.512 DIABETIC ULCER OF TOE OF RIGHT FOOT ASSOCIATED WITH TYPE 2 DIABETES MELLITUS, WITH FAT LAYER EXPOSED: ICD-10-CM

## 2024-07-01 DIAGNOSIS — M20.61 DEFORMITY, TOE ACQUIRED, RIGHT: ICD-10-CM

## 2024-07-01 DIAGNOSIS — L97.512 NON-PRESSURE CHRONIC ULCER OF OTHER PART OF RIGHT FOOT WITH FAT LAYER EXPOSED: ICD-10-CM

## 2024-07-01 LAB
ANION GAP SERPL CALCULATED.3IONS-SCNC: 11 MMOL/L (ref 5–15)
ANISOCYTOSIS BLD QL: NORMAL
BASOPHILS # BLD MANUAL: 0 10*3/MM3 (ref 0–0.2)
BASOPHILS NFR BLD MANUAL: 0 % (ref 0–1.5)
BUN SERPL-MCNC: 22 MG/DL (ref 8–23)
BUN/CREAT SERPL: 17.7 (ref 7–25)
CALCIUM SPEC-SCNC: 9.8 MG/DL (ref 8.6–10.5)
CHLORIDE SERPL-SCNC: 107 MMOL/L (ref 98–107)
CO2 SERPL-SCNC: 22 MMOL/L (ref 22–29)
CREAT SERPL-MCNC: 1.24 MG/DL (ref 0.76–1.27)
DEPRECATED RDW RBC AUTO: 44.4 FL (ref 37–54)
EGFRCR SERPLBLD CKD-EPI 2021: 64.1 ML/MIN/1.73
EOSINOPHIL # BLD MANUAL: 0.2 10*3/MM3 (ref 0–0.4)
EOSINOPHIL NFR BLD MANUAL: 4.1 % (ref 0.3–6.2)
ERYTHROCYTE [DISTWIDTH] IN BLOOD BY AUTOMATED COUNT: 13.5 % (ref 12.3–15.4)
GLUCOSE SERPL-MCNC: 127 MG/DL (ref 65–99)
HCT VFR BLD AUTO: 41.8 % (ref 37.5–51)
HGB BLD-MCNC: 13.7 G/DL (ref 13–17.7)
LYMPHOCYTES # BLD MANUAL: 1.92 10*3/MM3 (ref 0.7–3.1)
LYMPHOCYTES NFR BLD MANUAL: 10.2 % (ref 5–12)
MCH RBC QN AUTO: 29.7 PG (ref 26.6–33)
MCHC RBC AUTO-ENTMCNC: 32.8 G/DL (ref 31.5–35.7)
MCV RBC AUTO: 90.5 FL (ref 79–97)
MICROCYTES BLD QL: NORMAL
MONOCYTES # BLD: 0.5 10*3/MM3 (ref 0.1–0.9)
NEUTROPHILS # BLD AUTO: 2.32 10*3/MM3 (ref 1.7–7)
NEUTROPHILS NFR BLD MANUAL: 46.9 % (ref 42.7–76)
PLATELET # BLD AUTO: 132 10*3/MM3 (ref 140–450)
PMV BLD AUTO: 11.1 FL (ref 6–12)
POIKILOCYTOSIS BLD QL SMEAR: NORMAL
POLYCHROMASIA BLD QL SMEAR: NORMAL
POTASSIUM SERPL-SCNC: 4.5 MMOL/L (ref 3.5–5.2)
RBC # BLD AUTO: 4.62 10*6/MM3 (ref 4.14–5.8)
SMALL PLATELETS BLD QL SMEAR: NORMAL
SODIUM SERPL-SCNC: 140 MMOL/L (ref 136–145)
VARIANT LYMPHS NFR BLD MANUAL: 1 % (ref 0–5)
VARIANT LYMPHS NFR BLD MANUAL: 37.8 % (ref 19.6–45.3)
WBC MORPH BLD: NORMAL
WBC NRBC COR # BLD AUTO: 4.94 10*3/MM3 (ref 3.4–10.8)

## 2024-07-01 PROCEDURE — 93005 ELECTROCARDIOGRAM TRACING: CPT

## 2024-07-01 PROCEDURE — 36415 COLL VENOUS BLD VENIPUNCTURE: CPT

## 2024-07-01 PROCEDURE — 85025 COMPLETE CBC W/AUTO DIFF WBC: CPT

## 2024-07-01 PROCEDURE — 85007 BL SMEAR W/DIFF WBC COUNT: CPT

## 2024-07-01 PROCEDURE — 71046 X-RAY EXAM CHEST 2 VIEWS: CPT

## 2024-07-01 PROCEDURE — 80048 BASIC METABOLIC PNL TOTAL CA: CPT

## 2024-07-01 NOTE — DISCHARGE INSTRUCTIONS
Preparing for Surgery  Follow these instructions before the procedure:  Several days or weeks before your procedure  Medication(s) you need to stop ozempic  _7 days/prior to surgery      Ask your health care provider about:  Changing or stopping your regular medicines. This is especially important if you are taking diabetes medicines or blood thinners.  Taking medicines such as aspirin and ibuprofen. These medicines can thin your blood. Do not take these medicines unless your health care provider tells you to take them.  Taking over-the-counter medicines, vitamins, herbs, and supplements.    Contact your surgeon if you:  Develop a fever of more than 100.4°F (38°C) or other feelings of illness during the 48 hours before your surgery.  Have symptoms that get worse.  Have questions or concerns about your surgery.  If you are going home the same day of your surgery you will need to arrange for a responsible adult, age 18 years old or older, to drive you home from the hospital and stay with you for 24 hours. Verification of the  will be made prior to any procedure requiring sedation. You may not go home in a taxi or any form of public transportation by yourself.     Day before your procedure  Medication(s) you need to stop the day before your surgery: as per surgeon    24 hours before your procedure DO NOT drink alcoholic beverages or smoke.  24 hours before your procedure STOP taking Erectile Dysfunction medication (i.e.,Cialis, Viagra)   You may be asked to shower with a germ-killing soap.  Day of your procedure   You may take the following medication(s) the morning of surgery with a sip of water: gabapentin, keppra, metoprolol protonix      8 hours before your procedure STOP all food, any dairy products, and full liquids. This includes hard candy, chewing gum or mints. This is extremely important to prevent serious complications.     Up to 2 hours before your scheduled arrival time, you may have clear liquids no  cream, powder, or pulp of any kind. Safe options are water, black coffee, plain tea, soda, Gatorade/Powerade, clear broth, apple juice.    2 hours before your scheduled arrival time, STOP drinking clear liquids.    You may need to take another shower with a germ-killing soap before you leave home in the morning. Do not use perfumes, colognes, or body lotions.  Wear comfortable loose-fitting clothing.  Remove all jewelry including body piercing and rings, dark colored nail polish, and make up prior to arrival at the hospital. Leave all valuables at home.   Bring your hearing aids if you rely on them.  Do not wear contact lenses. If you wear eyeglasses remember to bring a case to store them in while you are in surgery.  Do not use denture adhesives since you will be asked to remove them during your surgery.    You do not need to bring your home medications into the hospital.   Bring your sleep apnea device with you on the day of your surgery (if this applies to you).  If you wear portable oxygen, bring it with you.   If you are staying overnight, you may bring a bag of items you may need such as slippers, robe and a change of clothes for your discharge. You may want to leave these items in the car until you are ready for them since your family will take your belongings when you leave the pre-operative area.  Arrive at the hospital as scheduled by the office. You will be asked to arrive 2 hours prior to your surgery time in order to prepare for your procedure.  When you arrive at the hospital  Go to the registration desk located at the main entrance of the hospital.  After registration is completed, you will be given a beeper and a sticker sheet. Take the stickers to Outpatient Surgery and place in the tray at the end of the desk to notify the staff that you have arrived and registered.   Return to the lobby to wait. You are not always called back according to the time of arrival but rather the time your doctor will be  ready.  When your beeper lights up and vibrates proceed through the double doors, under the stairs, and a member of the Outpatient Surgery staff will escort you to your preoperative room.

## 2024-07-02 LAB
QT INTERVAL: 380 MS
QTC INTERVAL: 401 MS

## 2024-07-03 ENCOUNTER — ANESTHESIA (OUTPATIENT)
Dept: PERIOP | Facility: HOSPITAL | Age: 64
End: 2024-07-03
Payer: MEDICARE

## 2024-07-03 ENCOUNTER — APPOINTMENT (OUTPATIENT)
Dept: GENERAL RADIOLOGY | Facility: HOSPITAL | Age: 64
End: 2024-07-03
Payer: MEDICARE

## 2024-07-03 ENCOUNTER — ANESTHESIA EVENT (OUTPATIENT)
Dept: PERIOP | Facility: HOSPITAL | Age: 64
End: 2024-07-03
Payer: MEDICARE

## 2024-07-03 ENCOUNTER — HOSPITAL ENCOUNTER (OUTPATIENT)
Facility: HOSPITAL | Age: 64
Setting detail: HOSPITAL OUTPATIENT SURGERY
Discharge: HOME | End: 2024-07-03
Attending: PODIATRIST | Admitting: PODIATRIST
Payer: MEDICARE

## 2024-07-03 VITALS
TEMPERATURE: 98 F | DIASTOLIC BLOOD PRESSURE: 66 MMHG | HEART RATE: 51 BPM | OXYGEN SATURATION: 96 % | RESPIRATION RATE: 16 BRPM | SYSTOLIC BLOOD PRESSURE: 120 MMHG

## 2024-07-03 DIAGNOSIS — L97.512 NON-PRESSURE CHRONIC ULCER OF OTHER PART OF RIGHT FOOT WITH FAT LAYER EXPOSED: ICD-10-CM

## 2024-07-03 DIAGNOSIS — L97.512 DIABETIC ULCER OF TOE OF RIGHT FOOT ASSOCIATED WITH TYPE 2 DIABETES MELLITUS, WITH FAT LAYER EXPOSED: ICD-10-CM

## 2024-07-03 DIAGNOSIS — M20.61 DEFORMITY, TOE ACQUIRED, RIGHT: ICD-10-CM

## 2024-07-03 DIAGNOSIS — E11.621 DIABETIC ULCER OF TOE OF RIGHT FOOT ASSOCIATED WITH TYPE 2 DIABETES MELLITUS, WITH FAT LAYER EXPOSED: ICD-10-CM

## 2024-07-03 LAB
GLUCOSE BLDC GLUCOMTR-MCNC: 105 MG/DL (ref 70–130)
GLUCOSE BLDC GLUCOMTR-MCNC: 112 MG/DL (ref 70–130)
GLUCOSE BLDC GLUCOMTR-MCNC: 155 MG/DL (ref 70–130)
GLUCOSE BLDC GLUCOMTR-MCNC: 49 MG/DL (ref 70–130)
GLUCOSE BLDC GLUCOMTR-MCNC: 88 MG/DL (ref 70–130)

## 2024-07-03 PROCEDURE — 25010000002 CEFAZOLIN PER 500 MG: Performed by: PODIATRIST

## 2024-07-03 PROCEDURE — 73620 X-RAY EXAM OF FOOT: CPT

## 2024-07-03 PROCEDURE — 25010000002 PROPOFOL 10 MG/ML EMULSION

## 2024-07-03 PROCEDURE — C1769 GUIDE WIRE: HCPCS | Performed by: PODIATRIST

## 2024-07-03 PROCEDURE — C1713 ANCHOR/SCREW BN/BN,TIS/BN: HCPCS | Performed by: PODIATRIST

## 2024-07-03 PROCEDURE — 25010000002 ONDANSETRON PER 1 MG

## 2024-07-03 PROCEDURE — 25010000002 VASOPRESSIN 20 UNIT/ML SOLUTION

## 2024-07-03 PROCEDURE — 25010000002 FENTANYL CITRATE (PF) 100 MCG/2ML SOLUTION

## 2024-07-03 PROCEDURE — 20900 REMOVAL OF BONE FOR GRAFT: CPT | Performed by: PODIATRIST

## 2024-07-03 PROCEDURE — 28285 REPAIR OF HAMMERTOE: CPT | Performed by: PODIATRIST

## 2024-07-03 PROCEDURE — 25810000003 LACTATED RINGERS PER 1000 ML: Performed by: PODIATRIST

## 2024-07-03 PROCEDURE — 82948 REAGENT STRIP/BLOOD GLUCOSE: CPT

## 2024-07-03 PROCEDURE — 25010000002 BUPIVACAINE 0.5 % SOLUTION: Performed by: PODIATRIST

## 2024-07-03 PROCEDURE — 82948 REAGENT STRIP/BLOOD GLUCOSE: CPT | Performed by: PHYSICIAN ASSISTANT

## 2024-07-03 DEVICE — IMPLANTABLE DEVICE: Type: IMPLANTABLE DEVICE | Site: FOOT | Status: FUNCTIONAL

## 2024-07-03 RX ORDER — SODIUM CHLORIDE 9 MG/ML
40 INJECTION, SOLUTION INTRAVENOUS AS NEEDED
Status: DISCONTINUED | OUTPATIENT
Start: 2024-07-03 | End: 2024-07-03 | Stop reason: HOSPADM

## 2024-07-03 RX ORDER — ONDANSETRON 2 MG/ML
INJECTION INTRAMUSCULAR; INTRAVENOUS AS NEEDED
Status: DISCONTINUED | OUTPATIENT
Start: 2024-07-03 | End: 2024-07-03 | Stop reason: SURG

## 2024-07-03 RX ORDER — LIDOCAINE HYDROCHLORIDE 10 MG/ML
0.5 INJECTION, SOLUTION EPIDURAL; INFILTRATION; INTRACAUDAL; PERINEURAL ONCE AS NEEDED
Status: DISCONTINUED | OUTPATIENT
Start: 2024-07-03 | End: 2024-07-03 | Stop reason: HOSPADM

## 2024-07-03 RX ORDER — ONDANSETRON 4 MG/1
4 TABLET, FILM COATED ORAL EVERY 8 HOURS PRN
Qty: 21 TABLET | Refills: 0 | Status: SHIPPED | OUTPATIENT
Start: 2024-07-03

## 2024-07-03 RX ORDER — DEXTROSE MONOHYDRATE 25 G/50ML
50 INJECTION, SOLUTION INTRAVENOUS
Status: DISCONTINUED | OUTPATIENT
Start: 2024-07-03 | End: 2024-07-03 | Stop reason: HOSPADM

## 2024-07-03 RX ORDER — FLUMAZENIL 0.1 MG/ML
0.2 INJECTION INTRAVENOUS AS NEEDED
Status: DISCONTINUED | OUTPATIENT
Start: 2024-07-03 | End: 2024-07-03 | Stop reason: HOSPADM

## 2024-07-03 RX ORDER — HYDROCODONE BITARTRATE AND ACETAMINOPHEN 5; 325 MG/1; MG/1
1 TABLET ORAL EVERY 4 HOURS PRN
Status: DISCONTINUED | OUTPATIENT
Start: 2024-07-03 | End: 2024-07-03 | Stop reason: HOSPADM

## 2024-07-03 RX ORDER — BUPIVACAINE HYDROCHLORIDE 5 MG/ML
INJECTION, SOLUTION PERINEURAL AS NEEDED
Status: DISCONTINUED | OUTPATIENT
Start: 2024-07-03 | End: 2024-07-03 | Stop reason: HOSPADM

## 2024-07-03 RX ORDER — FENTANYL CITRATE 50 UG/ML
50 INJECTION, SOLUTION INTRAMUSCULAR; INTRAVENOUS
Status: DISCONTINUED | OUTPATIENT
Start: 2024-07-03 | End: 2024-07-03 | Stop reason: HOSPADM

## 2024-07-03 RX ORDER — ONDANSETRON 2 MG/ML
4 INJECTION INTRAMUSCULAR; INTRAVENOUS ONCE AS NEEDED
Status: DISCONTINUED | OUTPATIENT
Start: 2024-07-03 | End: 2024-07-03 | Stop reason: HOSPADM

## 2024-07-03 RX ORDER — DOCUSATE SODIUM 100 MG/1
100 CAPSULE, LIQUID FILLED ORAL DAILY
Qty: 7 CAPSULE | Refills: 0 | Status: SHIPPED | OUTPATIENT
Start: 2024-07-03

## 2024-07-03 RX ORDER — SODIUM CHLORIDE, SODIUM LACTATE, POTASSIUM CHLORIDE, CALCIUM CHLORIDE 600; 310; 30; 20 MG/100ML; MG/100ML; MG/100ML; MG/100ML
1000 INJECTION, SOLUTION INTRAVENOUS CONTINUOUS
Status: DISCONTINUED | OUTPATIENT
Start: 2024-07-03 | End: 2024-07-03 | Stop reason: HOSPADM

## 2024-07-03 RX ORDER — SODIUM CHLORIDE, SODIUM LACTATE, POTASSIUM CHLORIDE, CALCIUM CHLORIDE 600; 310; 30; 20 MG/100ML; MG/100ML; MG/100ML; MG/100ML
100 INJECTION, SOLUTION INTRAVENOUS CONTINUOUS
Status: DISCONTINUED | OUTPATIENT
Start: 2024-07-03 | End: 2024-07-03 | Stop reason: HOSPADM

## 2024-07-03 RX ORDER — ACETAMINOPHEN 325 MG/1
650 TABLET ORAL EVERY 6 HOURS PRN
Status: DISCONTINUED | OUTPATIENT
Start: 2024-07-03 | End: 2024-07-03 | Stop reason: HOSPADM

## 2024-07-03 RX ORDER — NALOXONE HCL 0.4 MG/ML
0.4 VIAL (ML) INJECTION AS NEEDED
Status: DISCONTINUED | OUTPATIENT
Start: 2024-07-03 | End: 2024-07-03 | Stop reason: HOSPADM

## 2024-07-03 RX ORDER — HYDROCODONE BITARTRATE AND ACETAMINOPHEN 7.5; 325 MG/1; MG/1
1 TABLET ORAL EVERY 6 HOURS PRN
Qty: 16 TABLET | Refills: 0 | Status: SHIPPED | OUTPATIENT
Start: 2024-07-03

## 2024-07-03 RX ORDER — EPHEDRINE SULFATE 50 MG/ML
INJECTION, SOLUTION INTRAVENOUS AS NEEDED
Status: DISCONTINUED | OUTPATIENT
Start: 2024-07-03 | End: 2024-07-03 | Stop reason: SURG

## 2024-07-03 RX ORDER — SODIUM CHLORIDE 0.9 % (FLUSH) 0.9 %
3 SYRINGE (ML) INJECTION AS NEEDED
Status: DISCONTINUED | OUTPATIENT
Start: 2024-07-03 | End: 2024-07-03 | Stop reason: HOSPADM

## 2024-07-03 RX ORDER — HYDROCODONE BITARTRATE AND ACETAMINOPHEN 10; 325 MG/1; MG/1
1 TABLET ORAL EVERY 4 HOURS PRN
Status: DISCONTINUED | OUTPATIENT
Start: 2024-07-03 | End: 2024-07-03 | Stop reason: HOSPADM

## 2024-07-03 RX ORDER — LIDOCAINE HYDROCHLORIDE 20 MG/ML
INJECTION, SOLUTION EPIDURAL; INFILTRATION; INTRACAUDAL; PERINEURAL AS NEEDED
Status: DISCONTINUED | OUTPATIENT
Start: 2024-07-03 | End: 2024-07-03 | Stop reason: SURG

## 2024-07-03 RX ORDER — LABETALOL HYDROCHLORIDE 5 MG/ML
5 INJECTION, SOLUTION INTRAVENOUS
Status: DISCONTINUED | OUTPATIENT
Start: 2024-07-03 | End: 2024-07-03 | Stop reason: HOSPADM

## 2024-07-03 RX ORDER — SODIUM CHLORIDE 0.9 % (FLUSH) 0.9 %
3 SYRINGE (ML) INJECTION EVERY 12 HOURS SCHEDULED
Status: DISCONTINUED | OUTPATIENT
Start: 2024-07-03 | End: 2024-07-03 | Stop reason: HOSPADM

## 2024-07-03 RX ORDER — SODIUM CHLORIDE 0.9 % (FLUSH) 0.9 %
3-10 SYRINGE (ML) INJECTION AS NEEDED
Status: DISCONTINUED | OUTPATIENT
Start: 2024-07-03 | End: 2024-07-03 | Stop reason: HOSPADM

## 2024-07-03 RX ORDER — MAGNESIUM HYDROXIDE 1200 MG/15ML
LIQUID ORAL AS NEEDED
Status: DISCONTINUED | OUTPATIENT
Start: 2024-07-03 | End: 2024-07-03 | Stop reason: HOSPADM

## 2024-07-03 RX ORDER — PROPOFOL 10 MG/ML
VIAL (ML) INTRAVENOUS AS NEEDED
Status: DISCONTINUED | OUTPATIENT
Start: 2024-07-03 | End: 2024-07-03 | Stop reason: SURG

## 2024-07-03 RX ORDER — DROPERIDOL 2.5 MG/ML
0.62 INJECTION, SOLUTION INTRAMUSCULAR; INTRAVENOUS ONCE AS NEEDED
Status: DISCONTINUED | OUTPATIENT
Start: 2024-07-03 | End: 2024-07-03 | Stop reason: HOSPADM

## 2024-07-03 RX ORDER — FENTANYL CITRATE 50 UG/ML
INJECTION, SOLUTION INTRAMUSCULAR; INTRAVENOUS AS NEEDED
Status: DISCONTINUED | OUTPATIENT
Start: 2024-07-03 | End: 2024-07-03 | Stop reason: SURG

## 2024-07-03 RX ADMIN — SODIUM CHLORIDE, POTASSIUM CHLORIDE, SODIUM LACTATE AND CALCIUM CHLORIDE 1000 ML: 600; 310; 30; 20 INJECTION, SOLUTION INTRAVENOUS at 10:42

## 2024-07-03 RX ADMIN — ONDANSETRON 4 MG: 2 INJECTION INTRAMUSCULAR; INTRAVENOUS at 12:58

## 2024-07-03 RX ADMIN — DEXTROSE MONOHYDRATE 50 ML: 25 INJECTION, SOLUTION INTRAVENOUS at 11:52

## 2024-07-03 RX ADMIN — ACETAMINOPHEN 650 MG: 325 TABLET, FILM COATED ORAL at 14:19

## 2024-07-03 RX ADMIN — CEFAZOLIN 2000 MG: 2 INJECTION, POWDER, FOR SOLUTION INTRAMUSCULAR; INTRAVENOUS at 12:18

## 2024-07-03 RX ADMIN — FENTANYL CITRATE 50 MCG: 50 INJECTION, SOLUTION INTRAMUSCULAR; INTRAVENOUS at 12:32

## 2024-07-03 RX ADMIN — PROPOFOL 150 MG: 10 INJECTION, EMULSION INTRAVENOUS at 12:13

## 2024-07-03 RX ADMIN — EPHEDRINE SULFATE 10 MG: 50 INJECTION INTRAVENOUS at 13:01

## 2024-07-03 RX ADMIN — LIDOCAINE HYDROCHLORIDE 80 MG: 20 INJECTION, SOLUTION EPIDURAL; INFILTRATION; INTRACAUDAL; PERINEURAL at 12:13

## 2024-07-03 RX ADMIN — FENTANYL CITRATE 50 MCG: 50 INJECTION, SOLUTION INTRAMUSCULAR; INTRAVENOUS at 13:00

## 2024-07-03 NOTE — ANESTHESIA PREPROCEDURE EVALUATION
Anesthesia Evaluation     Patient summary reviewed   history of anesthetic complications:  PONV prolonged sedation  NPO Solid Status: > 8 hours             Airway   Mallampati: II  TM distance: >3 FB  Neck ROM: full  Dental    (+) poor dentition        Pulmonary    (+) ,sleep apnea (scheduling sleep study/CN XII stimulator )  (-) COPD, asthma, not a smoker  Cardiovascular   Exercise tolerance: good (4-7 METS)    ECG reviewed  PT is on anticoagulation therapy    (+) hypertension, valvular problems/murmurs murmur, CAD, CABG, dysrhythmias Atrial Fib, hyperlipidemia,  carotid artery disease  (-) pacemaker, past MI, angina, CHF, cardiac stents      Neuro/Psych  (+) seizures well controlled, CVA  (-) TIA  GI/Hepatic/Renal/Endo    (+) obesity, liver disease fatty liver disease, diabetes mellitus using insulin  (-) GERD, no renal disease    Musculoskeletal     Abdominal   (+) obese   Substance History      OB/GYN          Other   arthritis,   history of cancer                      Anesthesia Plan    ASA 4     general     (Hypoglycemic in holding-->treated; extensive history discussed. Aspirin taken appropriately )  intravenous induction     Anesthetic plan, risks, benefits, and alternatives have been provided, discussed and informed consent has been obtained with: patient.        CODE STATUS:

## 2024-07-03 NOTE — BRIEF OP NOTE
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 layer exposed [L97.512]  Diabetic ulcer of toe of right foot associated with type 2 diabetes mellitus, with fat layer exposed [E11.621, L97.512]  Deformity, toe acquired, right [M20.61]       Post-Op Diagnosis Codes:     * Non-pressure chronic ulcer of other part of right foot with fat layer exposed [L97.512]     * Diabetic ulcer of toe of right foot associated with type 2 diabetes mellitus, with fat layer exposed [E11.621, L97.512]     * Deformity, toe acquired, right [M20.61]    Procedure/CPT® Codes:        Procedure(s):  Bone Graft [place] - Right Foot  Hallux Interphalangeal Joint Arthrodesis - Right Foot            Surgeon(s):  Hernan Villa DPM    Anesthesia: General    Staff:   Circulator: Ethan Raymond RN  Scrub Person: Jackson Noel Tina M  Vendor Representative: Gonzales Villareal         Estimated Blood Loss: minimal    Urine Voided: * No values recorded between 7/3/2024 12:10 PM and 7/3/2024  1:04 PM *    Specimens:                None          Drains: * No LDAs found *    Findings: Consistent with pre-operative diagnoses.         Complications: None           Hernan Villa DPM     Date: 7/3/2024  Time: 13:13 CDT

## 2024-07-03 NOTE — ANESTHESIA PROCEDURE NOTES
Airway  Date/Time: 7/3/2024 12:15 PM  Airway not difficult    General Information and Staff    Patient location during procedure: OR  CRNA/CAA: Oriana Crowley CRNA    Indications and Patient Condition    Preoxygenated: yes  Mask difficulty assessment: 0 - not attempted    Final Airway Details  Final airway type: supraglottic airway      Successful airway: I-gel  Size 4     Number of attempts at approach: 1  Assessment: lips, teeth, and gum same as pre-op and atraumatic intubation

## 2024-07-03 NOTE — ANESTHESIA POSTPROCEDURE EVALUATION
Patient: Carlos A Boyer Jr.    Procedure Summary       Date: 07/03/24 Room / Location:  [facility] OR [address] [place] [address] [facility] OR    Anesthesia Start: 1210 Anesthesia Stop: 1307    Procedure: Hallux Interphalangeal Joint Arthrodesis, Bone Graft [place] - Right Foot (Right: Toes) Diagnosis:       Non-pressure chronic ulcer of other part of right foot with fat layer exposed      Diabetic ulcer of toe of right foot associated with type 2 diabetes mellitus, with fat layer exposed      Deformity, toe acquired, right      (Non-pressure chronic ulcer of other part of right foot with fat layer exposed [L97.512])      (Diabetic ulcer of toe of right foot associated with type 2 diabetes mellitus, with fat layer exposed [E11.621, L97.512])      (Deformity, toe acquired, right [M20.61])    Surgeons: Hernan Villa DPM Provider: Oriana Crowley CRNA    Anesthesia Type: general ASA Status: 4            Anesthesia Type: general    Vitals  Vitals Value Taken Time   /62 07/03/24 1345   Temp 98.3 °F (36.8 °C) 07/03/24 1305   Pulse 52 07/03/24 1347   Resp 16 07/03/24 1345   SpO2 96 % 07/03/24 1347   Vitals shown include unfiled device data.        Post Anesthesia Care and Evaluation    Patient location during evaluation: PACU  Patient participation: complete - patient participated  Level of consciousness: awake and alert  Pain management: adequate    Airway patency: patent  Anesthetic complications: No anesthetic complications    Cardiovascular status: acceptable  Respiratory status: acceptable  Hydration status: acceptable    Comments: Blood pressure 119/62, pulse 52, temperature 98.3 °F (36.8 °C), temperature source Temporal, resp. rate 16, SpO2 99%.    Pt discharged from PACU based on nawaf score >8

## 2024-07-03 NOTE — NURSING NOTE
Patient states that he feels much better.Re checked glucose at 1208 pm and at this time noted to be 155.

## 2024-07-03 NOTE — OP NOTE
POSTOPERATIVE NOTE  Patient: Carlos A Boyer Jr.  MRN: [id number]    YOB: 1960  Age: 64 y.o.  Sex: male  Unit:  [facility] OR Room/Bed: [facility] OR/MAIN OR Location: [facility]    Date of Operation: 7/3/2024     Preoperative Diagnosis:  Non-pressure chronic ulcer of other part of right foot with fat layer exposed [L97.512]  Diabetic ulcer of toe of right foot associated with type 2 diabetes mellitus, with fat layer exposed [E11.621, L97.512]  Deformity, toe acquired, right [M20.61]     Postoperative Diagnosis:  1.   Same as pre-operative    Operative Procedures:  Bone Graft [place] - Right Foot  Hallux Interphalangeal Joint Arthrodesis - Right Foot    Surgeon: Surgeons and Role:     * Hernan Villa DPM - Primary    Anesthesia: General     Hemostasis: Anatomic Dissection, Thigh Tourniquet, Electric cauterization    Estimated Blood Loss: minimal    Pathology:   None    Materials:   Implant Name Type Inv. Item Serial No.  Lot No. LRB No. Used Action   SCRW COMPR NOHD FUL/THRD TI STD [id number] - [id number] Implant SCRW COMPR NOHD FUL/THRD TI STD [id number]  [employer]  Right 1 Implanted       Injectables: 20mL 0.5% Marcaine Plain    Fluids: See anesthesia log.    Drains: None    Complications: None    Postoperative Condition: Stable. Patient tolerated procedure and anesthesia well. Patient left the operating room with vital signs stable and vascular status intact.     Operative Findings: Consistent with preoperative diagnosis.    Indications for Procedure: This 64 y.o. patient presents with chronic right great toe ulceration.  Patient states that they have failed conservative therapy and opts for surgical intervention at this time. The patient has been NPO for greater than 8 hours. The patient is ready for surgical intervention.    DESCRIPTION OF PROCEDURE  Under mild sedation, patient was brought into the operating room and placed on the operating room table in supine position. Preoperative antibiotic was  given. A pneumatic tourniquet was placed about the patient's right thigh. The patient was placed under General anesthesia, then local block was performed at the surgical site using the above mentioned local anesthesia. The foot and ankle were then scrubbed, prepped and draped in the usual aseptic manner. Using an Esmarch, the foot and ankle were exsanguinated and tourniquet was inflated.  A Tegaderm was placed over the ulceration.    Attention was then directed to the right lateral calcaneus where a 1 cm oblique linear incision was made.  Incision was deepened through the subcutaneous tissues using sharp and blunt dissection.  Care was taken to identify retract all vital neural and vascular structures.  All bleeders were ligated and cauterized necessary.  The dissection was continued down to bone.  Next a [place] elevator was utilized to free periosteum from the bone.  Next utilizing an [employer] bone harvester, several passes were made into the lateral calcaneus to obtain roughly 3 cc of cancellous bone.  The bone was placed on the back table to be used at a later time.  The wound was then flushed with copious amounts sterile saline.  Deep and subcutaneous tissues reapproximated utilizing 3-0 Vicryl.  Skin was reapproximated coapted utilizing 3-0 nylon in horizontal mattress suturing technique.      Attention was then directed to the dorsal aspect of the right hallux interphalangeal joint where an L-type incision was made.  Incision was deepened through the subcutaneous tissues using sharp and blunt dissection.  Care was taken to identify and retract all vital neural and vascular structures.  All bleeders were ligated and cauterized as necessary.  Next a tenotomy and capsulotomy was performed over the joint.  Soft tissue was freed from its bony attachments.  Next utilizing a bone saw, the head of the proximal phalanx and base of the distal phalanx was removed utilizing a rongeur and bone saw.  The wound was then  flushed with copious amounts of sterile saline.  Both sites were subchondrally drilled.  Previously acquired bone graft was packed into the fusion site.  Next a K wire was placed across the fusion site and then utilizing standard AO principles and techniques, an Arthrex 4.0 headless screw was placed from the distal aspect of the digit across the fusion site into the base of the proximal phalanx.  Fluoroscopy was utilized to confirm placement and apposition.  The wound was then flushed with copious amounts of sterile saline.  Tendon and subcutaneous tissues reapproximated utilizing 3-0 4-0 Vicryl.  Skin was reapproximated and coapted utilizing 3-0 nylon in simple and horizontal mattress suturing techniques.    The tourniquet was deflated and capillary refill was noted to all toes.  Hemostasis had been obtained.    All counts were correct.    A bandage consisting of Adaptic, Betadine soaked gauze, Kerlix, and Coban was applied.    The patient tolerated the procedures and anesthesia well.  They were transferred to the recovery room with vital signs stable and vascular status intact to the Right lower extremity.  After a period of postoperative monitoring, the patient will be discharged to home with oral and written postoperative instructions.  They will follow-up in office within 1 week.  They are to be nonweightbearing to the surgical foot.  He will be given a cam boot for stability and assist with ambulation.

## 2024-07-08 ENCOUNTER — OFFICE VISIT (OUTPATIENT)
Dept: WOUND CARE | Facility: HOSPITAL | Age: 64
End: 2024-07-08
Payer: MEDICARE

## 2024-07-12 ENCOUNTER — NURSE TRIAGE (OUTPATIENT)
Dept: CALL CENTER | Facility: HOSPITAL | Age: 64
End: 2024-07-12
Payer: MEDICARE

## 2024-07-13 NOTE — TELEPHONE ENCOUNTER
Reason for Disposition   Skin tape (e.g., Steri-Strips) removal, questions about   Sutured or stapled surgical incision, questions about    Additional Information   Negative: [1] Major abdominal surgical incision AND [2] wound gaping open AND [3] visible internal organs   Negative: Sounds like a life-threatening emergency to the triager   Negative: Patient has a Negative Pressure Wound Therapy device   Negative: Patient is followed by a wound clinic or wound specialist for this wound   Negative: [1] Bleeding from incision AND [2] won't stop after 10 minutes of direct pressure   Negative: [1] Bleeding (more than a few drops) from incision AND [2] tracheostomy or blood vessel surgery (e.g., carotid endarterectomy, femoral bypass graft, kidney dialysis fistula)   Negative: [1] Widespread rash AND [2] bright red, sunburn-like   Negative: Severe pain in the incision   Negative: [1] Incision gaping open AND [2] < 48 hours since wound re-opened   Negative: [1] Incision gaping open AND [2] length of opening > 2 inches (5 cm)   Negative: Patient sounds very sick or weak to the triager   Negative: Sounds like a serious complication to the triager   Negative: Fever > 100.4 F (38.0 C)   Negative: [1] Incision looks infected (spreading redness, pain) AND [2] fever > 99.5 F (37.5 C)   Negative: [1] Incision looks infected (spreading redness, pain) AND [2] large red area (> 2 in. or 5 cm)   Negative: [1] Incision looks infected (spreading redness, pain) AND [2] face wound   Negative: [1] Red streak runs from the incision AND [2] longer than 1 inch (2.5 cm)   Negative: [1] Pus or bad-smelling fluid draining from incision AND [2] no fever   Negative: [1] Post-op pain AND [2] not controlled with pain medications   Negative: Dressing soaked with blood or body fluid (e.g., drainage)   Negative: [1] Scant bleeding (e.g., few drops) from incision AND AND [2] tracheostomy or blood vessel surgery (e.g., carotid endarterectomy, femoral  "bypass graft, kidney dialysis fistula)   Negative: [1] Raised bruise and [2] size > 2 inches (5 cm) and expanding   Negative: [1] Caller has URGENT question AND [2] triager unable to answer question   Negative: [1] INCREASING pain in incision AND [2] > 2 days (48 hours) since surgery   Negative: [1] Small red area or streak AND [2] no fever   Negative: [1] Clear or blood-tinged fluid draining from wound AND [2] no fever   Negative: [1] Incision gaping open AND [2] > 48 hours since wound re-opened AND [3] length of opening > 1/2 inch (12 mm)   Negative: [1] Incision on face gaping open AND [2] > 48 hours since wound re-opened AND [3] length of opening > 1/4 inch (6 mm)   Negative: Suture or staple removal is overdue   Negative: [1] Suture or staple came out early AND [2] caller wants wound checked   Negative: [1] Caller has NON-URGENT question AND [2] triager unable to answer question   Negative: Pimple where a stitch comes through the skin   Negative: Suture (or staple) came out early   Negative: Mild bruising near incision site   Negative: Suture removal date, questions about   Negative: Surgical incision after sutures or staples removed, questions about   Negative: Numbness at incision site, questions about   Negative: Saline dressing changes, questions about    Answer Assessment - Initial Assessment Questions  1. SYMPTOM: \"What's the main symptom you're concerned about?\" (e.g., drainage, incision opening up, pain, redness)      Drainage on dressing  2. ONSET: \"When did drainage  start?\"      unknown  3. SURGERY: \"What surgery did you have?\"      Great right toe - pins and bone replacement  4. DATE of SURGERY: \"When was the surgery?\"       7/3  5. INCISION SITE: \"Where is the incision located?\"       Great toe  6. REDNESS: \"Is there any redness at the incision site?\" If Yes, ask: \"How wide across is the redness?\" (Inches, centimeters)       denies  7. PAIN: \"Is there any pain?\" If Yes, ask: \"How bad is it?\"  " "(Scale 1-10; or mild, moderate, severe)    - NONE (0): no pain    - MILD (1-3): doesn't interfere with normal activities     - MODERATE (4-7): interferes with normal activities or awakens from sleep     - SEVERE (8-10): excruciating pain, unable to do any normal activities      denies  8. BLEEDING: \"Is there any bleeding?\" If Yes, ask: \"How much?\" and \"Where?\"      denies  9. DRAINAGE: \"Is there any drainage from the incision site?\" If Yes, ask: \"What color and how much?\" (e.g., red, cloudy, pus; drops, teaspoon)      No visible drainage on the wound - dried drainage on the bandage only  10. FEVER: \"Do you have a fever?\" If Yes, ask: \"What is your temperature, how was it measured, and when did it start?\"        denies  11. OTHER SYMPTOMS: \"Do you have any other symptoms?\" (e.g., dizziness, rash elsewhere on body, shaking chills, weakness)        denies    Protocols used: Post-Op Incision Symptoms and Questions-ADULT-    "

## 2024-07-18 ENCOUNTER — OFFICE VISIT (OUTPATIENT)
Dept: WOUND CARE | Facility: HOSPITAL | Age: 64
End: 2024-07-18
Payer: OTHER GOVERNMENT

## 2024-07-18 DIAGNOSIS — E11.621 TYPE 2 DIABETES MELLITUS WITH FOOT ULCER, UNSPECIFIED WHETHER LONG TERM INSULIN USE: Primary | ICD-10-CM

## 2024-07-18 DIAGNOSIS — L97.512 NON-PRESSURE CHRONIC ULCER OF OTHER PART OF RIGHT FOOT WITH FAT LAYER EXPOSED: ICD-10-CM

## 2024-07-18 DIAGNOSIS — L97.509 TYPE 2 DIABETES MELLITUS WITH FOOT ULCER, UNSPECIFIED WHETHER LONG TERM INSULIN USE: Primary | ICD-10-CM

## 2024-07-18 DIAGNOSIS — M20.5X1 OTHER DEFORMITIES OF TOE(S) (ACQUIRED), RIGHT FOOT: ICD-10-CM

## 2024-07-18 DIAGNOSIS — Z48.89 ENCOUNTER FOR OTHER SPECIFIED SURGICAL AFTERCARE: ICD-10-CM

## 2024-07-25 ENCOUNTER — OFFICE VISIT (OUTPATIENT)
Dept: WOUND CARE | Facility: HOSPITAL | Age: 64
End: 2024-07-25
Payer: OTHER GOVERNMENT

## 2024-07-25 PROCEDURE — G0463 HOSPITAL OUTPT CLINIC VISIT: HCPCS

## 2024-07-27 ENCOUNTER — HOSPITAL ENCOUNTER (INPATIENT)
Facility: HOSPITAL | Age: 64
LOS: 3 days | Discharge: HOME | End: 2024-07-30
Attending: INTERNAL MEDICINE | Admitting: FAMILY MEDICINE
Payer: OTHER GOVERNMENT

## 2024-07-27 ENCOUNTER — APPOINTMENT (OUTPATIENT)
Dept: GENERAL RADIOLOGY | Facility: HOSPITAL | Age: 64
End: 2024-07-27
Payer: OTHER GOVERNMENT

## 2024-07-27 DIAGNOSIS — L03.031 CELLULITIS OF GREAT TOE OF RIGHT FOOT: Primary | ICD-10-CM

## 2024-07-27 LAB
ALBUMIN SERPL-MCNC: 4.2 G/DL (ref 3.5–5.2)
ALBUMIN/GLOB SERPL: 1.4 G/DL
ALP SERPL-CCNC: 86 U/L (ref 39–117)
ALT SERPL W P-5'-P-CCNC: 19 U/L (ref 1–41)
ANION GAP SERPL CALCULATED.3IONS-SCNC: 14 MMOL/L (ref 5–15)
AST SERPL-CCNC: 15 U/L (ref 1–40)
BASOPHILS # BLD AUTO: 0.03 10*3/MM3 (ref 0–0.2)
BASOPHILS NFR BLD AUTO: 0.6 % (ref 0–1.5)
BILIRUB SERPL-MCNC: 0.9 MG/DL (ref 0–1.2)
BUN SERPL-MCNC: 23 MG/DL (ref 8–23)
BUN/CREAT SERPL: 17.2 (ref 7–25)
CALCIUM SPEC-SCNC: 9.5 MG/DL (ref 8.6–10.5)
CHLORIDE SERPL-SCNC: 104 MMOL/L (ref 98–107)
CO2 SERPL-SCNC: 19 MMOL/L (ref 22–29)
CREAT SERPL-MCNC: 1.34 MG/DL (ref 0.76–1.27)
CRP SERPL-MCNC: 0.33 MG/DL (ref 0–0.5)
D-LACTATE SERPL-SCNC: 1.4 MMOL/L (ref 0.5–2)
D-LACTATE SERPL-SCNC: 2.3 MMOL/L (ref 0.5–2)
DEPRECATED RDW RBC AUTO: 43.6 FL (ref 37–54)
EGFRCR SERPLBLD CKD-EPI 2021: 58.4 ML/MIN/1.73
EOSINOPHIL # BLD AUTO: 0.17 10*3/MM3 (ref 0–0.4)
EOSINOPHIL NFR BLD AUTO: 3.3 % (ref 0.3–6.2)
ERYTHROCYTE [DISTWIDTH] IN BLOOD BY AUTOMATED COUNT: 13.4 % (ref 12.3–15.4)
GLOBULIN UR ELPH-MCNC: 3.1 GM/DL
GLUCOSE BLDC GLUCOMTR-MCNC: 170 MG/DL (ref 70–130)
GLUCOSE BLDC GLUCOMTR-MCNC: 179 MG/DL (ref 70–130)
GLUCOSE SERPL-MCNC: 243 MG/DL (ref 65–99)
HCT VFR BLD AUTO: 43.9 % (ref 37.5–51)
HGB BLD-MCNC: 14.6 G/DL (ref 13–17.7)
IMM GRANULOCYTES # BLD AUTO: 0.02 10*3/MM3 (ref 0–0.05)
IMM GRANULOCYTES NFR BLD AUTO: 0.4 % (ref 0–0.5)
LYMPHOCYTES # BLD AUTO: 1.64 10*3/MM3 (ref 0.7–3.1)
LYMPHOCYTES NFR BLD AUTO: 31.6 % (ref 19.6–45.3)
MCH RBC QN AUTO: 29.9 PG (ref 26.6–33)
MCHC RBC AUTO-ENTMCNC: 33.3 G/DL (ref 31.5–35.7)
MCV RBC AUTO: 90 FL (ref 79–97)
MONOCYTES # BLD AUTO: 0.44 10*3/MM3 (ref 0.1–0.9)
MONOCYTES NFR BLD AUTO: 8.5 % (ref 5–12)
MRSA DNA SPEC QL NAA+PROBE: NORMAL
NEUTROPHILS NFR BLD AUTO: 2.89 10*3/MM3 (ref 1.7–7)
NEUTROPHILS NFR BLD AUTO: 55.6 % (ref 42.7–76)
PLATELET # BLD AUTO: 111 10*3/MM3 (ref 140–450)
PMV BLD AUTO: 11.5 FL (ref 6–12)
POTASSIUM SERPL-SCNC: 4.4 MMOL/L (ref 3.5–5.2)
PROCALCITONIN SERPL-MCNC: 0.04 NG/ML (ref 0–0.25)
PROT SERPL-MCNC: 7.3 G/DL (ref 6–8.5)
RBC # BLD AUTO: 4.88 10*6/MM3 (ref 4.14–5.8)
SODIUM SERPL-SCNC: 137 MMOL/L (ref 136–145)
WBC NRBC COR # BLD AUTO: 5.19 10*3/MM3 (ref 3.4–10.8)

## 2024-07-27 PROCEDURE — 25010000002 VANCOMYCIN 1 G RECONSTITUTED SOLUTION 1 EACH VIAL

## 2024-07-27 PROCEDURE — 87040 BLOOD CULTURE FOR BACTERIA: CPT

## 2024-07-27 PROCEDURE — 85025 COMPLETE CBC W/AUTO DIFF WBC: CPT

## 2024-07-27 PROCEDURE — 87040 BLOOD CULTURE FOR BACTERIA: CPT | Performed by: INTERNAL MEDICINE

## 2024-07-27 PROCEDURE — 25010000002 MORPHINE PER 10 MG: Performed by: INTERNAL MEDICINE

## 2024-07-27 PROCEDURE — 25010000002 MORPHINE PER 10 MG

## 2024-07-27 PROCEDURE — 83605 ASSAY OF LACTIC ACID: CPT

## 2024-07-27 PROCEDURE — 82948 REAGENT STRIP/BLOOD GLUCOSE: CPT

## 2024-07-27 PROCEDURE — 25010000002 CEFEPIME PER 500 MG: Performed by: INTERNAL MEDICINE

## 2024-07-27 PROCEDURE — 86140 C-REACTIVE PROTEIN: CPT

## 2024-07-27 PROCEDURE — 25010000002 VANCOMYCIN 10 G RECONSTITUTED SOLUTION: Performed by: INTERNAL MEDICINE

## 2024-07-27 PROCEDURE — 25810000003 SODIUM CHLORIDE 0.9 % SOLUTION: Performed by: INTERNAL MEDICINE

## 2024-07-27 PROCEDURE — 99285 EMERGENCY DEPT VISIT HI MDM: CPT

## 2024-07-27 PROCEDURE — 73630 X-RAY EXAM OF FOOT: CPT

## 2024-07-27 PROCEDURE — 25810000003 SODIUM CHLORIDE 0.9 % SOLUTION 250 ML FLEX CONT

## 2024-07-27 PROCEDURE — 36415 COLL VENOUS BLD VENIPUNCTURE: CPT

## 2024-07-27 PROCEDURE — 87641 MR-STAPH DNA AMP PROBE: CPT | Performed by: INTERNAL MEDICINE

## 2024-07-27 PROCEDURE — 80053 COMPREHEN METABOLIC PANEL: CPT

## 2024-07-27 PROCEDURE — 84145 PROCALCITONIN (PCT): CPT

## 2024-07-27 PROCEDURE — 63710000001 INSULIN LISPRO (HUMAN) PER 5 UNITS: Performed by: INTERNAL MEDICINE

## 2024-07-27 PROCEDURE — 63710000001 INSULIN GLARGINE PER 5 UNITS: Performed by: INTERNAL MEDICINE

## 2024-07-27 PROCEDURE — 25010000002 ONDANSETRON PER 1 MG

## 2024-07-27 RX ORDER — ACETAMINOPHEN 325 MG/1
325 TABLET ORAL EVERY 6 HOURS PRN
Status: DISCONTINUED | OUTPATIENT
Start: 2024-07-27 | End: 2024-07-27 | Stop reason: SDUPTHER

## 2024-07-27 RX ORDER — FLUTICASONE PROPIONATE 50 MCG
2 SPRAY, SUSPENSION (ML) NASAL DAILY
Status: DISCONTINUED | OUTPATIENT
Start: 2024-07-28 | End: 2024-07-30 | Stop reason: HOSPADM

## 2024-07-27 RX ORDER — SODIUM CHLORIDE 0.9 % (FLUSH) 0.9 %
10 SYRINGE (ML) INJECTION EVERY 12 HOURS SCHEDULED
Status: DISCONTINUED | OUTPATIENT
Start: 2024-07-27 | End: 2024-07-30 | Stop reason: HOSPADM

## 2024-07-27 RX ORDER — AMOXICILLIN 250 MG
2 CAPSULE ORAL 2 TIMES DAILY PRN
Status: DISCONTINUED | OUTPATIENT
Start: 2024-07-27 | End: 2024-07-30 | Stop reason: HOSPADM

## 2024-07-27 RX ORDER — LEVETIRACETAM 500 MG/1
2000 TABLET ORAL NIGHTLY
Status: DISCONTINUED | OUTPATIENT
Start: 2024-07-27 | End: 2024-07-30 | Stop reason: HOSPADM

## 2024-07-27 RX ORDER — INSULIN LISPRO 100 [IU]/ML
3-14 INJECTION, SOLUTION INTRAVENOUS; SUBCUTANEOUS
Status: DISCONTINUED | OUTPATIENT
Start: 2024-07-27 | End: 2024-07-30 | Stop reason: HOSPADM

## 2024-07-27 RX ORDER — ACETAMINOPHEN 160 MG/5ML
650 SOLUTION ORAL EVERY 4 HOURS PRN
Status: DISCONTINUED | OUTPATIENT
Start: 2024-07-27 | End: 2024-07-30 | Stop reason: HOSPADM

## 2024-07-27 RX ORDER — POLYETHYLENE GLYCOL 3350 17 G/17G
17 POWDER, FOR SOLUTION ORAL DAILY PRN
Status: DISCONTINUED | OUTPATIENT
Start: 2024-07-27 | End: 2024-07-30 | Stop reason: HOSPADM

## 2024-07-27 RX ORDER — LEVETIRACETAM 500 MG/1
1500 TABLET ORAL DAILY
Status: DISCONTINUED | OUTPATIENT
Start: 2024-07-28 | End: 2024-07-30 | Stop reason: HOSPADM

## 2024-07-27 RX ORDER — FUROSEMIDE 20 MG/1
40 TABLET ORAL DAILY PRN
Status: DISCONTINUED | OUTPATIENT
Start: 2024-07-27 | End: 2024-07-30 | Stop reason: HOSPADM

## 2024-07-27 RX ORDER — MORPHINE SULFATE 2 MG/ML
1 INJECTION, SOLUTION INTRAMUSCULAR; INTRAVENOUS EVERY 4 HOURS PRN
Status: DISCONTINUED | OUTPATIENT
Start: 2024-07-27 | End: 2024-07-28 | Stop reason: ALTCHOICE

## 2024-07-27 RX ORDER — ALBUTEROL SULFATE 2.5 MG/3ML
2.5 SOLUTION RESPIRATORY (INHALATION) EVERY 6 HOURS PRN
Status: DISCONTINUED | OUTPATIENT
Start: 2024-07-27 | End: 2024-07-30 | Stop reason: HOSPADM

## 2024-07-27 RX ORDER — LAMOTRIGINE 100 MG/1
200 TABLET ORAL 2 TIMES DAILY
Status: DISCONTINUED | OUTPATIENT
Start: 2024-07-27 | End: 2024-07-30 | Stop reason: HOSPADM

## 2024-07-27 RX ORDER — TAMSULOSIN HYDROCHLORIDE 0.4 MG/1
0.4 CAPSULE ORAL DAILY
Status: DISCONTINUED | OUTPATIENT
Start: 2024-07-28 | End: 2024-07-30 | Stop reason: HOSPADM

## 2024-07-27 RX ORDER — ROSUVASTATIN CALCIUM 20 MG/1
20 TABLET, COATED ORAL NIGHTLY
Status: DISCONTINUED | OUTPATIENT
Start: 2024-07-27 | End: 2024-07-30 | Stop reason: HOSPADM

## 2024-07-27 RX ORDER — ACETAMINOPHEN 325 MG/1
650 TABLET ORAL EVERY 4 HOURS PRN
Status: DISCONTINUED | OUTPATIENT
Start: 2024-07-27 | End: 2024-07-30 | Stop reason: HOSPADM

## 2024-07-27 RX ORDER — ASPIRIN 81 MG/1
81 TABLET, CHEWABLE ORAL DAILY
Status: DISCONTINUED | OUTPATIENT
Start: 2024-07-28 | End: 2024-07-30 | Stop reason: HOSPADM

## 2024-07-27 RX ORDER — GABAPENTIN 300 MG/1
300 CAPSULE ORAL NIGHTLY
Status: DISCONTINUED | OUTPATIENT
Start: 2024-07-27 | End: 2024-07-30 | Stop reason: HOSPADM

## 2024-07-27 RX ORDER — PANTOPRAZOLE SODIUM 40 MG/1
40 TABLET, DELAYED RELEASE ORAL
Status: DISCONTINUED | OUTPATIENT
Start: 2024-07-28 | End: 2024-07-30 | Stop reason: HOSPADM

## 2024-07-27 RX ORDER — NITROGLYCERIN 0.4 MG/1
0.4 TABLET SUBLINGUAL
Status: DISCONTINUED | OUTPATIENT
Start: 2024-07-27 | End: 2024-07-30 | Stop reason: HOSPADM

## 2024-07-27 RX ORDER — BISACODYL 10 MG
10 SUPPOSITORY, RECTAL RECTAL DAILY PRN
Status: DISCONTINUED | OUTPATIENT
Start: 2024-07-27 | End: 2024-07-30 | Stop reason: HOSPADM

## 2024-07-27 RX ORDER — ONDANSETRON 2 MG/ML
4 INJECTION INTRAMUSCULAR; INTRAVENOUS ONCE
Status: COMPLETED | OUTPATIENT
Start: 2024-07-27 | End: 2024-07-27

## 2024-07-27 RX ORDER — METOPROLOL TARTRATE 100 MG/1
100 TABLET ORAL 2 TIMES DAILY
Status: DISCONTINUED | OUTPATIENT
Start: 2024-07-27 | End: 2024-07-30 | Stop reason: HOSPADM

## 2024-07-27 RX ORDER — SPIRONOLACTONE 50 MG/1
50 TABLET, FILM COATED ORAL DAILY
Status: DISCONTINUED | OUTPATIENT
Start: 2024-07-28 | End: 2024-07-30 | Stop reason: HOSPADM

## 2024-07-27 RX ORDER — SODIUM CHLORIDE 9 MG/ML
40 INJECTION, SOLUTION INTRAVENOUS AS NEEDED
Status: DISCONTINUED | OUTPATIENT
Start: 2024-07-27 | End: 2024-07-30 | Stop reason: HOSPADM

## 2024-07-27 RX ORDER — NICOTINE POLACRILEX 4 MG
15 LOZENGE BUCCAL
Status: DISCONTINUED | OUTPATIENT
Start: 2024-07-27 | End: 2024-07-30 | Stop reason: HOSPADM

## 2024-07-27 RX ORDER — ONDANSETRON 2 MG/ML
4 INJECTION INTRAMUSCULAR; INTRAVENOUS EVERY 6 HOURS PRN
Status: DISCONTINUED | OUTPATIENT
Start: 2024-07-27 | End: 2024-07-30 | Stop reason: HOSPADM

## 2024-07-27 RX ORDER — GUAIFENESIN 600 MG/1
1200 TABLET, EXTENDED RELEASE ORAL DAILY PRN
Status: DISCONTINUED | OUTPATIENT
Start: 2024-07-27 | End: 2024-07-30 | Stop reason: HOSPADM

## 2024-07-27 RX ORDER — HYDROCODONE BITARTRATE AND ACETAMINOPHEN 7.5; 325 MG/1; MG/1
1 TABLET ORAL EVERY 6 HOURS PRN
Status: DISCONTINUED | OUTPATIENT
Start: 2024-07-27 | End: 2024-07-27 | Stop reason: SDUPTHER

## 2024-07-27 RX ORDER — NALOXONE HCL 0.4 MG/ML
0.4 VIAL (ML) INJECTION
Status: DISCONTINUED | OUTPATIENT
Start: 2024-07-27 | End: 2024-07-28 | Stop reason: ALTCHOICE

## 2024-07-27 RX ORDER — IBUPROFEN 600 MG/1
1 TABLET ORAL
Status: DISCONTINUED | OUTPATIENT
Start: 2024-07-27 | End: 2024-07-30 | Stop reason: HOSPADM

## 2024-07-27 RX ORDER — BISACODYL 5 MG/1
5 TABLET, DELAYED RELEASE ORAL DAILY PRN
Status: DISCONTINUED | OUTPATIENT
Start: 2024-07-27 | End: 2024-07-30 | Stop reason: HOSPADM

## 2024-07-27 RX ORDER — DEXTROSE MONOHYDRATE 25 G/50ML
25 INJECTION, SOLUTION INTRAVENOUS
Status: DISCONTINUED | OUTPATIENT
Start: 2024-07-27 | End: 2024-07-30 | Stop reason: HOSPADM

## 2024-07-27 RX ORDER — ACETAMINOPHEN 650 MG/1
650 SUPPOSITORY RECTAL EVERY 4 HOURS PRN
Status: DISCONTINUED | OUTPATIENT
Start: 2024-07-27 | End: 2024-07-30 | Stop reason: HOSPADM

## 2024-07-27 RX ORDER — OXYCODONE HYDROCHLORIDE AND ACETAMINOPHEN 5; 325 MG/1; MG/1
1 TABLET ORAL EVERY 8 HOURS PRN
Status: DISCONTINUED | OUTPATIENT
Start: 2024-07-27 | End: 2024-07-30 | Stop reason: HOSPADM

## 2024-07-27 RX ORDER — SODIUM CHLORIDE 0.9 % (FLUSH) 0.9 %
10 SYRINGE (ML) INJECTION AS NEEDED
Status: DISCONTINUED | OUTPATIENT
Start: 2024-07-27 | End: 2024-07-30 | Stop reason: HOSPADM

## 2024-07-27 RX ORDER — ISOSORBIDE MONONITRATE 60 MG/1
120 TABLET, EXTENDED RELEASE ORAL DAILY
Status: DISCONTINUED | OUTPATIENT
Start: 2024-07-28 | End: 2024-07-30 | Stop reason: HOSPADM

## 2024-07-27 RX ORDER — DOCUSATE SODIUM 100 MG/1
100 CAPSULE, LIQUID FILLED ORAL DAILY
Status: DISCONTINUED | OUTPATIENT
Start: 2024-07-27 | End: 2024-07-30 | Stop reason: HOSPADM

## 2024-07-27 RX ORDER — EZETIMIBE 10 MG/1
10 TABLET ORAL DAILY
Status: DISCONTINUED | OUTPATIENT
Start: 2024-07-27 | End: 2024-07-30 | Stop reason: HOSPADM

## 2024-07-27 RX ORDER — VANCOMYCIN/0.9 % SOD CHLORIDE 1.5G/250ML
1500 PLASTIC BAG, INJECTION (ML) INTRAVENOUS EVERY 24 HOURS
Status: DISCONTINUED | OUTPATIENT
Start: 2024-07-28 | End: 2024-07-27 | Stop reason: SDUPTHER

## 2024-07-27 RX ADMIN — METOPROLOL TARTRATE 100 MG: 100 TABLET, FILM COATED ORAL at 20:28

## 2024-07-27 RX ADMIN — INSULIN LISPRO 3 UNITS: 100 INJECTION, SOLUTION INTRAVENOUS; SUBCUTANEOUS at 20:44

## 2024-07-27 RX ADMIN — APIXABAN 5 MG: 5 TABLET, FILM COATED ORAL at 20:28

## 2024-07-27 RX ADMIN — LAMOTRIGINE 200 MG: 100 TABLET ORAL at 20:28

## 2024-07-27 RX ADMIN — MORPHINE SULFATE 4 MG: 4 INJECTION, SOLUTION INTRAMUSCULAR; INTRAVENOUS at 15:10

## 2024-07-27 RX ADMIN — Medication 10 ML: at 20:30

## 2024-07-27 RX ADMIN — METFORMIN HCL 1000 MG: 500 TABLET ORAL at 20:27

## 2024-07-27 RX ADMIN — LEVETIRACETAM 2000 MG: 500 TABLET, FILM COATED ORAL at 20:28

## 2024-07-27 RX ADMIN — SACUBITRIL AND VALSARTAN 1 TABLET: 97; 103 TABLET, FILM COATED ORAL at 20:29

## 2024-07-27 RX ADMIN — INSULIN GLARGINE 30 UNITS: 100 INJECTION, SOLUTION SUBCUTANEOUS at 20:44

## 2024-07-27 RX ADMIN — VANCOMYCIN HYDROCHLORIDE 1000 MG: 1 INJECTION, POWDER, LYOPHILIZED, FOR SOLUTION INTRAVENOUS at 15:13

## 2024-07-27 RX ADMIN — MORPHINE SULFATE 1 MG: 2 INJECTION, SOLUTION INTRAMUSCULAR; INTRAVENOUS at 20:46

## 2024-07-27 RX ADMIN — ONDANSETRON 4 MG: 2 INJECTION INTRAMUSCULAR; INTRAVENOUS at 15:10

## 2024-07-27 RX ADMIN — SODIUM CHLORIDE 1750 MG: 9 INJECTION, SOLUTION INTRAVENOUS at 22:10

## 2024-07-27 RX ADMIN — CEFEPIME 2000 MG: 2 INJECTION, POWDER, FOR SOLUTION INTRAVENOUS at 20:29

## 2024-07-27 RX ADMIN — GABAPENTIN 300 MG: 300 CAPSULE ORAL at 20:28

## 2024-07-27 RX ADMIN — ROSUVASTATIN CALCIUM 20 MG: 20 TABLET, FILM COATED ORAL at 20:27

## 2024-07-27 NOTE — ED PROVIDER NOTES
Subjective   History of Present Illness  Patient presents to the ER with right great toe infection after having surgery on ulcer on toe 7/3/24 by Dr. Villa.  Reports that he recently remove the dressing and has been trying to keep it clean he reported that the surgical site has healed but today he woke up with increased edema, pain and erythema to the site.  Patient denies any fever or chills.  Patient is a diabetic and has had chronic foot wounds for over a year.        Review of Systems   Musculoskeletal:         Swelling and redness and pain on right great toe   All other systems reviewed and are negative.      Past Medical History:   Diagnosis Date    Arthritis     Asthma     Cancer     skin    Carotid disease, bilateral     Cellulitis     right foot - on antibiotics 6-    Chest pain     Chronic diastolic congestive heart failure 09/07/2019    Chronic sinusitis     Maribell bullosa     Coronary artery disease involving native coronary artery of native heart with unstable angina pectoris 01/17/2017    Deviated septum     Diabetes mellitus     Difficulty urinating     Diverticulitis     Enlarged prostate     Fatty liver     GERD (gastroesophageal reflux disease)     [place] (hard of hearing)     Does have hearing aids    Hyperlipidemia LDL goal <70 02/02/2017    Hypertrophy of nasal turbinates     Keratoderma     Kidney stone     Lung nodules     lower right lung    Migraine     Murmur, heart     Myocardial infarction     Obesity     Paroxysmal atrial fibrillation 07/11/2019    Personal history of COVID-19 07/2021    PONV (postoperative nausea and vomiting)     Primary hypertension 10/16/2016    Psoriasis     Seizures     Sinus congestion     Skin cancer     Sleep apnea     not using cpap    SOB (shortness of breath)     Stroke     mild weakness on right side    UTI (urinary tract infection)        Allergies   Allergen Reactions    Flagyl [Metronidazole] Hives    Atorvastatin Other (See Comments) and Myalgia       - LIPITOR -   Muscle cramps    Ciprofloxacin Hives      - CIPRO -        Past Surgical History:   Procedure Laterality Date    CARDIAC CATHETERIZATION  01/2016    Dr. Broadbent; widely patent previously placed stents in the left anterior descending and obstructive disease involving the diagonal branch which was treated medically    CARDIAC CATHETERIZATION N/A 07/14/2017    Procedure: Left Heart Cath;  Surgeon: Wade Ramey MD;  Location:  [facility];  Service:     CARDIAC CATHETERIZATION Left 10/15/2018    Procedure: Cardiac Catheterization/Vascular Study;  Surgeon: Wade Ramey MD;  Location:  [facility];  Service: Cardiology    CARDIAC CATHETERIZATION  10/15/2018    Procedure: Functional Flow [place];  Surgeon: Wade Ramey MD;  Location:  [facility];  Service: Cardiology    CARDIAC CATHETERIZATION N/A 10/15/2018    Procedure: Left ventriculography;  Surgeon: Wade Ramey MD;  Location:  [facility];  Service: Cardiology    CARDIAC CATHETERIZATION Left 06/26/2019    Procedure: Cardiac Catheterization/Vascular Study VEL OK  HE WILL WAIT 1 YEAR FOR SHOULDER SURGERY ;  Surgeon: Wade Ramey MD;  Location:  [facility];  Service: Cardiology    CARDIAC CATHETERIZATION Left 04/30/2021    Procedure: Coronary angiography;  Surgeon: Sahil Llamas MD;  Location:  [facility];  Service: Cardiology;  Laterality: Left;    CARDIAC CATHETERIZATION N/A 04/30/2021    Procedure: Percutaneous Coronary Intervention;  Surgeon: Sahil Llamas MD;  Location:  [facility];  Service: Cardiology;  Laterality: N/A;    CARDIAC CATHETERIZATION N/A 11/09/2022    Procedure: Left Heart Cath with SVGs;  Surgeon: Wade Ramey MD;  Location:  [facility];  Service: Cardiology;  Laterality: N/A;    CARPAL TUNNEL RELEASE      January 2024 and pther hand February 2024    CHOLECYSTECTOMY       CHOLECYSTECTOMY WITH INTRAOPERATIVE CHOLANGIOGRAM N/A 08/01/2018    Procedure: CHOLECYSTECTOMY LAPAROSCOPIC INTRAOPERATIVE CHOLANGIOGRAM;  Surgeon: Shane Ann MD;  Location: [facility] OR;  Service: General    COLONOSCOPY N/A 07/14/2020    Procedure: COLONOSCOPY WITH ANESTHESIA;  Surgeon: Anupam Morales DO;  Location: [facility] ENDOSCOPY;  Service: Gastroenterology;  Laterality: N/A;  pre: abdominal pain  post: diverticulosis  iVnayak Correia PA    CORONARY ANGIOPLASTY      CORONARY ARTERY BYPASS GRAFT N/A 07/06/2019    Procedure: CABG X2 WITH LIMA, LEFT LEG OVH, AND PLACEMENT OF LEFT FEMORAL ARTERIAL LINE;  Surgeon: Steven Tang MD;  Location: [facility] OR;  Service: Cardiothoracic    CORONARY STENT PLACEMENT      x 6    CYSTOSCOPY TRANSURETHRAL RESECTION OF PROSTATE N/A 01/23/2023    Procedure: CYSTOSCOPY TRANSURETHRAL RESECTION OF PROSTATE;  Surgeon: Latrell Pope MD;  Location: [facility] OR;  Service: Urology;  Laterality: N/A;    ENDOSCOPIC FUNCTIONAL SINUS SURGERY (FESS) Bilateral 12/13/2017    Procedure: PROCEDURE PERFORMED:  Bilateral functional endoscopic anterior ethmoidectomy with bilateral middle meatal antrostomy Septoplasty Right kathia bullosa resection Bilateral inferior turbinate reduction via Cob[place];  Surgeon: Mayank Ibarra MD;  Location: [facility] OR;  Service:     ENDOSCOPY N/A 07/30/2018    Procedure: ESOPHAGOGASTRODUODENOSCOPY WITH ANESTHESIA;  Surgeon: Benitez Mas MD;  Location: [facility] ENDOSCOPY;  Service: Gastroenterology    ENDOSCOPY N/A 07/14/2020    Procedure: ESOPHAGOGASTRODUODENOSCOPY WITH ANESTHESIA;  Surgeon: Anupam Morales DO;  Location: [facility] ENDOSCOPY;  Service: Gastroenterology;  Laterality: N/A;  pre: abdominal pain  post: esophagitis  Vinayak Correia PA    HERNIA REPAIR      x2 inguinal area    KIDNEY STONE SURGERY      KNEE ARTHROSCOPY Right 03/01/2022    Procedure: RIGHT KNEE PARTIAL LATERAL MENISCECTOMY;  Surgeon: Pedro Pablo Song MD;   Location:  [facility] OR;  Service: Orthopedics;  Laterality: Right;    KNEE SURGERY Right     OTHER SURGICAL HISTORY      urolift    PROSTATE SURGERY      Dr. Badillo - 2017    PULMONARY ARTERY PRESSURE SENSOR IMPLANT N/A 2023    Procedure: PA Pressure Sensor Implant;  Surgeon: Wade Ramey MD;  Location:  [facility];  Service: Cardiology;  Laterality: N/A;    ROTATOR CUFF REPAIR Right     SLEEP ENDOSCOPY N/A 2023    Procedure: Videosleep endoscopy;  Surgeon: Mayank Ibarra MD;  Location:  [facility] OR;  Service: ENT;  Laterality: N/A;    THUMB AMPUTATION Left     partial    TOE FUSION Right 7/3/2024    Procedure: Hallux Interphalangeal Joint Arthrodesis, Bone Graft [place] - Right Foot;  Surgeon: Hernan Villa DPM;  Location:  [facility] OR;  Service: Podiatry;  Laterality: Right;    TOE SURGERY      great toe       Family History   Problem Relation Age of Onset    Heart disease Father     COPD Mother     Hypertension Mother     Asthma Mother     No Known Problems Sister     Colon cancer Paternal Uncle     Prostate cancer Maternal Grandfather     No Known Problems Sister     Colon cancer Maternal Grandmother     Colon polyps Maternal Grandmother        Social History     Socioeconomic History    Marital status:    Tobacco Use    Smoking status: Former     Current packs/day: 0.00     Average packs/day: 1.5 packs/day for 18.0 years (27.0 ttl pk-yrs)     Types: Cigarettes, Cigars     Start date:      Quit date:      Years since quittin.5     Passive exposure: Past    Smokeless tobacco: Former     Types: Chew     Quit date:    Vaping Use    Vaping status: Never Used   Substance and Sexual Activity    Alcohol use: No     Comment: 0    Drug use: No    Sexual activity: Defer           Objective   Physical Exam  Vitals and nursing note reviewed.   Constitutional:       General: He is not in acute distress.     Appearance: Normal appearance. He is normal weight. He is not  toxic-appearing or diaphoretic.   HENT:      Head: Normocephalic and atraumatic.      Right Ear: External ear normal.      Left Ear: External ear normal.      Nose: Nose normal.      Mouth/Throat:      Mouth: Mucous membranes are moist.   Eyes:      General:         Right eye: No discharge.         Left eye: No discharge.      Extraocular Movements: Extraocular movements intact.      Conjunctiva/sclera: Conjunctivae normal.   Cardiovascular:      Rate and Rhythm: Normal rate.      Pulses: Normal pulses.      Heart sounds: Normal heart sounds.   Pulmonary:      Effort: Pulmonary effort is normal. No respiratory distress.      Breath sounds: Normal breath sounds. No rhonchi.      Comments: Dorsal pedal pulse strong  Abdominal:      General: Abdomen is flat.      Tenderness: There is no abdominal tenderness. There is no guarding or rebound.   Musculoskeletal:         General: No deformity or signs of injury.      Cervical back: Normal range of motion.   Skin:     General: Skin is warm.      Capillary Refill: Capillary refill takes 2 to 3 seconds.      Coloration: Skin is not jaundiced.      Comments: Right Toe has erythema and edema, tender to touch   Neurological:      General: No focal deficit present.      Mental Status: He is alert and oriented to person, place, and time. Mental status is at baseline.   Psychiatric:         Mood and Affect: Mood normal.         Behavior: Behavior normal.         Thought Content: Thought content normal.         Judgment: Judgment normal.         Procedures           ED Course                                             Medical Decision Making  History of Present Illness  Patient presents to the ER with right great toe infection after having surgery on ulcer on toe 7/3/24 by Dr. Villa.  Reports that he recently remove the dressing and has been trying to keep it clean he reported that the surgical site has healed but today he woke up with increased edema, pain and erythema to the  "site.  Patient denies any fever or chills.  Patient is a diabetic and has had chronic foot wounds for over a year.    Differential diagnosis: Osteomyelitis, fracture, sepsis, and others    Labs Reviewed  COMPREHENSIVE METABOLIC PANEL - Abnormal; Notable for the following components:     Glucose                       243 (*)                Creatinine                    1.34 (*)               CO2                           19.0 (*)               eGFR                          58.4 (*)            All other components within normal limits         Narrative: GFR Normal >60                  Chronic Kidney Disease <60                  Kidney Failure <15                    LACTIC ACID, PLASMA - Abnormal; Notable for the following components:     Lactate                       2.3 (*)             All other components within normal limits  CBC WITH AUTO DIFFERENTIAL - Abnormal; Notable for the following components:     Platelets                     111 (*)             All other components within normal limits  PROCALCITONIN - Normal         Narrative: As a Marker for Sepsis (Non-Neonates):                                    1. <0.5 ng/mL represents a low risk of severe sepsis and/or septic shock.                  2. >2 ng/mL represents a high risk of severe sepsis and/or septic shock.                                    As a Marker for Lower Respiratory Tract Infections that require antibiotic therapy:                                    PCT on Admission    Antibiotic Therapy       6-12 Hrs later                                    >0.5                Strongly Recommended                  >0.25 - <0.5        Recommended                   0.1 - 0.25          Discouraged              Remeasure/reassess PCT                  <0.1                Strongly Discouraged     Remeasure/reassess PCT                                    As 28 day mortality risk marker: \"Change in Procalcitonin Result\" (>80% or <=80%) if Day 0 (or Day 1) and Day 4 " values are available. Refer to [email or web address]-[email or web address]                                    Change in PCT <=80%                  A decrease of PCT levels below or equal to 80% defines a positive change in PCT test result representing a higher risk for 28-day all-cause mortality of patients diagnosed with severe sepsis for septic shock.                                    Change in PCT >80%                  A decrease of PCT levels of more than 80% defines a negative change in PCT result representing a lower risk for 28-day all-cause mortality of patients diagnosed with severe sepsis or septic shock.                     C-REACTIVE PROTEIN - Normal  BLOOD CULTURE  BLOOD CULTURE  LACTIC ACID, REFLEX  CBC AND DIFFERENTIAL   XR Foot 3+ View Right   Final Result    1. Increased soft tissue swelling of the right great toe, correlate    clinically for soft tissue infection. No obvious bony destruction is    identified, postsurgical changes as described, with slight widening of    the osteotomy at the interphalangeal joint level. No significant bony    bridging associated with the arthrodesis at this time.         This report was signed and finalized on 7/27/2024 12:39 PM by Dr. Cyril Reyna MD.       Lactic slightly elevated, blood cell count was normal.  Patient's x-ray shows soft tissue infection.  Patient was given IV vancomycin while in the ER.  Patient is a diabetic.  Patient does suffer from chronic foot wounds.  Spoke with hospitalist who is agreeable to admission at this time.  This time patient's care will be transferred over to hospitalist for further evaluation of the treatment of the right great toe cellulitis.    Problems Addressed:  Cellulitis of great toe of right foot: complicated acute illness or injury    Amount and/or Complexity of Data Reviewed  Labs: ordered.  Radiology: ordered.    Risk  Prescription drug management.  Decision regarding hospitalization.        Final diagnoses:    Cellulitis of great toe of right foot       ED Disposition  ED Disposition       ED Disposition   Decision to Admit    Condition   --    Comment   Level of Care: Med/Surg [1]   Diagnosis: Cellulitis of right toe [[id number]]   Admitting Physician: SIDRA JOHN [[id number]]   Certification: I Certify That Inpatient Hospital Services Are Medically Necessary For Greater Than 2 Midnights                 No follow-up provider specified.       Medication List      No changes were made to your prescriptions during this visit.            Ana Fitzpatrick, APRN  07/27/24 1603

## 2024-07-27 NOTE — H&P
[facility] Medicine Services  HISTORY AND PHYSICAL    Date of Admission: 7/27/2024  Primary Care Physician: Vinayak Correia PA    Subjective   Primary Historian: Patient    Chief Complaint: Swollen/red right big toe    History of Present Illness  Mr. Boyer is a 64-year-old male with past medical history of psoriatic arthritis, skin cancer, asthma, bilateral carotid stenosis, chronic diastolic congestive heart failure, coronary artery disease, diabetes mellitus, BPH, GERD, hyperlipidemia, paroxysmal atrial fibrillation, history of myocardial infarction, stroke, seizure disorder, psoriasis, diverticulosis and kathia bullosa, who presented to the emergency room with complaint of swelling and redness of right big toe.  Patient said he has had ulcer on the plantar surface of the right big toe for about 5 years, Dr. Villa recently took over care of managing his wound and had a procedure done on 7/3/2024 and thereafter the wound healed.  This morning he woke up with pain, swelling and redness of the right big toe, prompting his emergency room visit.  In the emergency room x-ray was done and showed soft tissue swelling without any obvious bony involvement.  He was given 1 dose of vancomycin in the emergency room.    Review of Systems   Otherwise complete ROS reviewed and negative except as mentioned in the HPI.    Past Medical History:   Past Medical History:   Diagnosis Date    Arthritis     Asthma     Cancer     skin    Carotid disease, bilateral     Cellulitis     right foot - on antibiotics 6-    Chest pain     Chronic diastolic congestive heart failure 09/07/2019    Chronic sinusitis     Kathia bullosa     Coronary artery disease involving native coronary artery of native heart with unstable angina pectoris 01/17/2017    Deviated septum     Diabetes mellitus     Difficulty urinating     Diverticulitis     Enlarged prostate     Fatty liver     GERD (gastroesophageal  reflux disease)     [place] (hard of hearing)     Does have hearing aids    Hyperlipidemia LDL goal <70 02/02/2017    Hypertrophy of nasal turbinates     Keratoderma     Kidney stone     Lung nodules     lower right lung    Migraine     Murmur, heart     Myocardial infarction     Obesity     Paroxysmal atrial fibrillation 07/11/2019    Personal history of COVID-19 07/2021    PONV (postoperative nausea and vomiting)     Primary hypertension 10/16/2016    Psoriasis     Seizures     Sinus congestion     Skin cancer     Sleep apnea     not using cpap    SOB (shortness of breath)     Stroke     mild weakness on right side    UTI (urinary tract infection)      Past Surgical History:  Past Surgical History:   Procedure Laterality Date    CARDIAC CATHETERIZATION  01/2016    Dr. Broadbent; widely patent previously placed stents in the left anterior descending and obstructive disease involving the diagonal branch which was treated medically    CARDIAC CATHETERIZATION N/A 07/14/2017    Procedure: Left Heart Cath;  Surgeon: Wade Ramey MD;  Location:  [facility] CATH [facility]VASIVE [facility];  Service:     CARDIAC CATHETERIZATION Left 10/15/2018    Procedure: Cardiac Catheterization/Vascular Study;  Surgeon: Wade Ramey MD;  Location:  [facility]ATH [facility];  Service: Cardiology    CARDIAC CATHETERIZATION  10/15/2018    Procedure: Functional Flow [place];  Surgeon: Wade Ramey MD;  Location:  [facility]ATH [facility];  Service: Cardiology    CARDIAC CATHETERIZATION N/A 10/15/2018    Procedure: Left ventriculography;  Surgeon: Wade Ramey MD;  Location:  [facility]ATH [facility];  Service: Cardiology    CARDIAC CATHETERIZATION Left 06/26/2019    Procedure: Cardiac Catheterization/Vascular Study VEL OK  HE WILL WAIT 1 YEAR FOR SHOULDER SURGERY ;  Surgeon: Wade Ramey MD;  Location:  [facility];  Service: Cardiology    CARDIAC CATHETERIZATION Left 04/30/2021    Procedure: Coronary angiography;  Surgeon:  Sahil Llamas MD;  Location:  [facility]ATH [facility];  Service: Cardiology;  Laterality: Left;    CARDIAC CATHETERIZATION N/A 04/30/2021    Procedure: Percutaneous Coronary Intervention;  Surgeon: Sahil Llamas MD;  Location:  [facility];  Service: Cardiology;  Laterality: N/A;    CARDIAC CATHETERIZATION N/A 11/09/2022    Procedure: Left Heart Cath with SVGs;  Surgeon: Wade Ramey MD;  Location:  [facility]ATH [facility];  Service: Cardiology;  Laterality: N/A;    CARPAL TUNNEL RELEASE      January 2024 and pther hand February 2024    CHOLECYSTECTOMY      CHOLECYSTECTOMY WITH INTRAOPERATIVE CHOLANGIOGRAM N/A 08/01/2018    Procedure: CHOLECYSTECTOMY LAPAROSCOPIC INTRAOPERATIVE CHOLANGIOGRAM;  Surgeon: Shane Ann MD;  Location:  [facility] OR;  Service: General    COLONOSCOPY N/A 07/14/2020    Procedure: COLONOSCOPY WITH ANESTHESIA;  Surgeon: Anupam Morales DO;  Location: [facility] ENDOSCOPY;  Service: Gastroenterology;  Laterality: N/A;  pre: abdominal pain  post: diverticulosis  Vinayak Correia PA    CORONARY ANGIOPLASTY      CORONARY ARTERY BYPASS GRAFT N/A 07/06/2019    Procedure: CABG X2 WITH LIMA, LEFT LEG OVH, AND PLACEMENT OF LEFT FEMORAL ARTERIAL LINE;  Surgeon: Steven Tang MD;  Location:  [facility] OR;  Service: Cardiothoracic    CORONARY STENT PLACEMENT      x 6    CYSTOSCOPY TRANSURETHRAL RESECTION OF PROSTATE N/A 01/23/2023    Procedure: CYSTOSCOPY TRANSURETHRAL RESECTION OF PROSTATE;  Surgeon: Latrell Pope MD;  Location:  [facility] OR;  Service: Urology;  Laterality: N/A;    ENDOSCOPIC FUNCTIONAL SINUS SURGERY (FESS) Bilateral 12/13/2017    Procedure: PROCEDURE PERFORMED:  Bilateral functional endoscopic anterior ethmoidectomy with bilateral middle meatal antrostomy Septoplasty Right kathia bullosa resection Bilateral inferior turbinate reduction via [place];  Surgeon: Mayank Ibarra MD;  Location: [facility] OR;  Service:     ENDOSCOPY N/A 07/30/2018     Procedure: ESOPHAGOGASTRODUODENOSCOPY WITH ANESTHESIA;  Surgeon: Benitez Mas MD;  Location:  [facility] ENDOSCOPY;  Service: Gastroenterology    ENDOSCOPY N/A 07/14/2020    Procedure: ESOPHAGOGASTRODUODENOSCOPY WITH ANESTHESIA;  Surgeon: Anupam Morales DO;  Location:  [facility] ENDOS[facility];  Service: Gastroenterology;  Laterality: N/A;  pre: abdominal pain  post: esophagitis  Vinayak Correia, PA    HERNIA REPAIR      x2 inguinal area    KIDNEY STONE SURGERY      KNEE ARTHROSCOPY Right 03/01/2022    Procedure: RIGHT KNEE PARTIAL LATERAL MENISCECTOMY;  Surgeon: Pedro Pablo Song MD;  Location:  [facility] OR;  Service: Orthopedics;  Laterality: Right;    KNEE SURGERY Right     OTHER SURGICAL HISTORY      urolift    PROSTATE SURGERY      Dr. Badillo - 2017    PULMONARY ARTERY PRESSURE SENSOR IMPLANT N/A 05/08/2023    Procedure: PA Pressure Sensor Implant;  Surgeon: Wade Ramey MD;  [facility]: [facility] CATH [facility]IVE [facility];  Service: Cardiology;  Laterality: N/A;    ROTATOR CUFF REPAIR Right     SLEEP ENDOSCOPY N/A 02/27/2023    Procedure: Videosleep endoscopy;  Surgeon: Mayank Ibarra MD;  Location:  [facility] OR;  Service: ENT;  Laterality: N/A;    THUMB AMPUTATION Left     partial    TOE FUSION Right 7/3/2024    Procedure: Hallux Interphalangeal Joint Arthrodesis, Bone Graft [place] - Right Foot;  Surgeon: Hernan Villa DPM;  Location:  [facility] OR;  Service: Podiatry;  Laterality: Right;    TOE SURGERY      great toe     Social History:  reports that he quit smoking about 31 years ago. His smoking use included cigarettes and cigars. He started smoking about 49 years ago. He has a 27 pack-year smoking history. He has been exposed to tobacco smoke. He quit smokeless tobacco use about 15 years ago.  His smokeless tobacco use included chew. He reports that he does not drink alcohol and does not use drugs.    Family History: family history includes Asthma in his mother; COPD in his mother; Colon cancer in his  maternal grandmother and paternal uncle; Colon polyps in his maternal grandmother; Heart disease in his father; Hypertension in his mother; No Known Problems in his sister and sister; Prostate cancer in his maternal grandfather.       Allergies:  Allergies   Allergen Reactions    Flagyl [Metronidazole] Hives    Atorvastatin Other (See Comments) and Myalgia      - LIPITOR -   Muscle cramps    Ciprofloxacin Hives      - CIPRO -        Medications:  Prior to Admission medications    Medication Sig Start Date End Date Taking? Authorizing Provider   acetaminophen (TYLENOL) 325 MG tablet Take 1 tablet by mouth Every 6 (Six) Hours As Needed for Mild Pain.    Rohan Christina MD   albuterol (PROVENTIL HFA;VENTOLIN HFA) 108 (90 BASE) MCG/ACT inhaler Inhale 2 puffs Every 6 (Six) Hours As Needed for Wheezing.    Rohan Christina MD   apixaban (ELIQUIS) 5 MG tablet tablet Take 1 tablet by mouth 2 (Two) Times a Day.    Rohan Christina MD   Aspirin 81 MG capsule Take 81 mg by mouth Daily. Dr. Ramey or Rohan Hanson MD   calcium polycarbophil (FIBERCON) 625 MG tablet Take 1 tablet by mouth Daily As Needed.    Rohan Christina MD   carboxymethylcellulose (REFRESH PLUS) 0.5 % solution Administer 1 drop to both eyes 4 (Four) Times a Day As Needed for Dry Eyes.    Rohan Christina MD   docusate sodium (COLACE) 100 MG capsule Take 1 capsule by mouth Daily. 7/3/24   Hernan Villa DPM   empagliflozin (JARDIANCE) 10 MG tablet tablet Take 1 tablet by mouth Daily.    Rohan Christina MD   ezetimibe (ZETIA) 10 MG tablet Take 1 tablet by mouth Daily.    Rohan Christina MD   fluticasone (FLONASE) 50 MCG/ACT nasal spray 2 sprays into the nostril(s) as directed by provider Daily.    Rohan Christina MD   furosemide (Lasix) 40 MG tablet Take 1 tablet by mouth Daily As Needed (take as needed for weight gain more than 2 pounds in a day or more than than 3 pounds in 2 days.). 4/19/23    Nick Carolina APRN   gabapentin (NEURONTIN) 300 MG capsule Take 1 capsule by mouth Every Night.    Rohan Christina MD   guaiFENesin (MUCINEX) 600 MG 12 hr tablet Take 2 tablets by mouth Daily As Needed for Congestion.    Rohan Christina MD   HYDROcodone-acetaminophen (NORCO) 7.5-325 MG per tablet Take 1 tablet by mouth Every 6 (Six) Hours As Needed for Moderate Pain (Pain). 7/3/24   Hernan Villa DPM   insulin aspart (novoLOG FLEXPEN) 100 UNIT/ML solution pen-injector sc pen Inject 50 Units under the skin into the appropriate area as directed 3 (Three) Times a Day With Meals. Can use 5 to 6 more units if needed in early evening.    Rohan Christina MD   INSULIN GLARGINE-YFGN SC Inject 100 Units under the skin into the appropriate area as directed Every Night.    Rohan Christina MD   INSULIN GLARGINE-YFGN SC Inject 80 Units under the skin into the appropriate area as directed Daily.    Rohan Christina MD   isosorbide mononitrate (IMDUR) 120 MG 24 hr tablet Take 1 tablet by mouth Daily. 1/11/22   Agnieszka Jeff APRN   lamoTRIgine (LaMICtal) 200 MG tablet Take 1 tablet by mouth 2 (Two) Times a Day. 8/22/23   Flako Freeman APRN   levETIRAcetam (KEPPRA) 500 MG tablet Take 3 tablets by mouth Every Morning. 8/22/23   Flako Freeman APRN   levETIRAcetam (KEPPRA) 500 MG tablet Take 4 tablets by mouth Every Night. 8/22/23   Flako Freeman APRN   metFORMIN (GLUCOPHAGE) 1000 MG tablet Take 1 tablet by mouth 2 (Two) Times a Day With Meals.    Rohan Christina MD   metoprolol tartrate (LOPRESSOR) 100 MG tablet Take 1 tablet by mouth 2 (Two) Times a Day.    Rohan Christina MD   Multiple Vitamin (MULTI VITAMIN PO) Take 1 tablet by mouth Daily.    Rohan Christina MD   nitroglycerin (NITROSTAT) 0.4 MG SL tablet Place 1 tablet under the tongue Every 5 (Five) Minutes As Needed for Chest Pain. Take no more than 3 doses in 15 minutes. 4/4/24   Ben Burden APRN    ondansetron (Zofran) 4 MG tablet Take 1 tablet by mouth Every 8 (Eight) Hours As Needed for Nausea or Vomiting. 7/3/24   Hernan Villa DPM   pantoprazole (PROTONIX) 40 MG EC tablet Take 1 tablet by mouth 2 (Two) Times a Day.    Rohan Christina MD   polyethylene glycol (MIRALAX) 17 GM/SCOOP powder Take two scoops twice a day until bowel movements normalize 9/21/23   Dewayne Pandey MD   psyllium (METAMUCIL) 58.6 % packet Take 1 packet by mouth Daily As Needed (constipation).    Rohan Christina MD   Rimegepant Sulfate (Nurtec) 75 MG tablet dispersible tablet Take 1 tablet by mouth 3 (Three) Times a Day As Needed (migraine).    Rohan Christina MD   rosuvastatin (CRESTOR) 40 MG tablet Take 0.5 tablets by mouth Every Night.    Rohan Christina MD   sacubitril-valsartan (Entresto)  MG tablet Take 1 tablet by mouth 2 (Two) Times a Day.    Rohan Christina MD   Semaglutide,0.25 or 0.5MG/DOS, (Ozempic, 0.25 or 0.5 MG/DOSE,) 2 MG/1.5ML solution pen-injector Inject 0.5 mg under the skin into the appropriate area as directed 1 (One) Time Per Week. Monday    Rohan Christina MD   spironolactone (ALDACTONE) 50 MG tablet Take 1 tablet by mouth Daily.  Patient not taking: Reported on 7/3/2024    Rohan Christina MD   sulfamethoxazole-trimethoprim (BACTRIM,SEPTRA) 400-80 MG tablet Take 1 tablet by mouth Every 12 (Twelve) Hours.  Patient not taking: Reported on 7/3/2024 6/21/24   Rohan Christina MD   tamsulosin (FLOMAX) 0.4 MG capsule 24 hr capsule Take 1 capsule by mouth Daily.    Rohan Christina MD     I have utilized all available immediate resources to obtain, update, or review the patient's current medications (including all prescriptions, over-the-counter products, herbals, cannabis/cannabidiol products, and vitamin/mineral/dietary (nutritional) supplements).    Objective     Vital Signs: /75 (BP Location: Right arm, Patient Position: Sitting)    "Pulse 65   Temp 97.6 °F (36.4 °C) (Oral)   Resp 18   Ht 181 cm (71.26\")   Wt 118 kg (260 lb 2.3 oz)   SpO2 98%   BMI 36.02 kg/m²   Physical Exam   GEN: Awake, alert, interactive, in NAD  HEENT: Atraumatic, PERRLA, EOMI, Anicteric, Trachea midline  Lungs: CTAB, no wheezing/rales/rhonchi  Heart: RRR, +S1/s2, no rub  ABD: soft, nt/nd, +BS, no guarding/rebound  Extremities: right big toe is swollen, erythematous and very tender.  No obvious wound but toe looked as if it has fungal infection.  Skin: no rashes or lesions  Neuro: AAOx3, no focal deficits  Psych: normal mood & affect       Results Reviewed:  Lab Results (last 24 hours)       Procedure Component Value Units Date/Time    POC Glucose Once [[id number]]  (Abnormal) Collected: 07/27/24 1712    Specimen: Blood Updated: 07/27/24 1723     Glucose 170 mg/dL      Comment: : [id number] Shane EmilyMeter ID: [id number]       STAT Lactic Acid, Reflex [[id number]]  (Normal) Collected: 07/27/24 1638    Specimen: Blood Updated: 07/27/24 1708     Lactate 1.4 mmol/L     Blood Culture - Blood, Arm, Left [[id number]] Collected: 07/27/24 1339    Specimen: Blood from Arm, Left Updated: 07/27/24 1350    Procalcitonin [[id number]]  (Normal) Collected: 07/27/24 1230    Specimen: Blood Updated: 07/27/24 1328     Procalcitonin 0.04 ng/mL     Narrative:      As a Marker for Sepsis (Non-Neonates):    1. <0.5 ng/mL represents a low risk of severe sepsis and/or septic shock.  2. >2 ng/mL represents a high risk of severe sepsis and/or septic shock.    As a Marker for Lower Respiratory Tract Infections that require antibiotic therapy:    PCT on Admission    Antibiotic Therapy       6-12 Hrs later    >0.5                Strongly Recommended  >0.25 - <0.5        Recommended   0.1 - 0.25          Discouraged              Remeasure/reassess PCT  <0.1                Strongly Discouraged     Remeasure/reassess PCT    As 28 day mortality risk marker: \"Change in Procalcitonin Result\" (>80% or " <=80%) if Day 0 (or Day 1) and Day 4 values are available. Refer to http://www.[facility]-pct-calculator.com    Change in PCT <=80%  A decrease of PCT levels below or equal to 80% defines a positive change in PCT test result representing a higher risk for 28-day all-cause mortality of patients diagnosed with severe sepsis for septic shock.    Change in PCT >80%  A decrease of PCT levels of more than 80% defines a negative change in PCT result representing a lower risk for 28-day all-cause mortality of patients diagnosed with severe sepsis or septic shock.       Lactic Acid, Plasma [[id number]]  (Abnormal) Collected: 07/27/24 1230    Specimen: Blood Updated: 07/27/24 1326     Lactate 2.3 mmol/L     Comprehensive Metabolic Panel [[id number]]  (Abnormal) Collected: 07/27/24 1230    Specimen: Blood Updated: 07/27/24 1324     Glucose 243 mg/dL      BUN 23 mg/dL      Creatinine 1.34 mg/dL      Sodium 137 mmol/L      Potassium 4.4 mmol/L      Comment: Slight hemolysis detected by analyzer. Result may be falsely elevated.        Chloride 104 mmol/L      CO2 19.0 mmol/L      Calcium 9.5 mg/dL      Total Protein 7.3 g/dL      Albumin 4.2 g/dL      ALT (SGPT) 19 U/L      AST (SGOT) 15 U/L      Alkaline Phosphatase 86 U/L      Total Bilirubin 0.9 mg/dL      Globulin 3.1 gm/dL      A/G Ratio 1.4 g/dL      BUN/Creatinine Ratio 17.2     Anion Gap 14.0 mmol/L      eGFR 58.4 mL/min/1.73     Narrative:      GFR Normal >60  Chronic Kidney Disease <60  Kidney Failure <15      C-reactive Protein [[id number]]  (Normal) Collected: 07/27/24 1230    Specimen: Blood Updated: 07/27/24 1324     C-Reactive Protein 0.33 mg/dL     CBC & Differential [[id number]]  (Abnormal) Collected: 07/27/24 1230    Specimen: Blood Updated: 07/27/24 1306    Narrative:      The following orders were created for panel order CBC & Differential.  Procedure                               Abnormality         Status                     ---------                                -----------         ------                     CBC Auto Differential[[id number]]        Abnormal            Final result                 Please view results for these tests on the individual orders.    CBC Auto Differential [[id number]]  (Abnormal) Collected: 07/27/24 1230    Specimen: Blood Updated: 07/27/24 1306     WBC 5.19 10*3/mm3      RBC 4.88 10*6/mm3      Hemoglobin 14.6 g/dL      Hematocrit 43.9 %      MCV 90.0 fL      MCH 29.9 pg      MCHC 33.3 g/dL      RDW 13.4 %      RDW-SD 43.6 fl      MPV 11.5 fL      Platelets 111 10*3/mm3      Neutrophil % 55.6 %      Lymphocyte % 31.6 %      Monocyte % 8.5 %      Eosinophil % 3.3 %      Basophil % 0.6 %      Immature Grans % 0.4 %      Neutrophils, Absolute 2.89 10*3/mm3      Lymphocytes, Absolute 1.64 10*3/mm3      Monocytes, Absolute 0.44 10*3/mm3      Eosinophils, Absolute 0.17 10*3/mm3      Basophils, Absolute 0.03 10*3/mm3      Immature Grans, Absolute 0.02 10*3/mm3     Blood Culture - Blood, Arm, Left [[id number]] Collected: 07/27/24 1230    Specimen: Blood from Arm, Left Updated: 07/27/24 1303          Imaging Results (Last 24 Hours)       Procedure Component Value Units Date/Time    XR Foot 3+ View Right [[id number]] Collected: 07/27/24 1236     Updated: 07/27/24 1242    Narrative:      EXAMINATION: XR FOOT 3+ VW RIGHT- 7/27/2024 12:36 PM     HISTORY: Since surgery possible infection.     REPORT: 3 views of the right foot were obtained.     COMPARISON: Right foot x-rays 7/3/2024.     There is moderate swelling of the right great toe, this appears  increased, no soft tissue gas is identified. There is previous osteotomy  and arthrodesis at the interphalangeal joint of the right great toe with  a single compression screw which appears to be in satisfactory position.  Compared with the recent x-rays there is slight widening of the  osteotomy space. No freddie osseous destruction is identified. Moderate  overgrowth of the first metatarsal head as before. The other  joint  spaces are preserved. No acute fracture.       Impression:      1. Increased soft tissue swelling of the right great toe, correlate  clinically for soft tissue infection. No obvious bony destruction is  identified, postsurgical changes as described, with slight widening of  the osteotomy at the interphalangeal joint level. No significant bony  bridging associated with the arthrodesis at this time.     This report was signed and finalized on 7/27/2024 12:39 PM by Dr. Cyril Reyna MD.             I have personally reviewed and interpreted the radiology studies and ECG obtained at time of admission.     Assessment / Plan   Assessment:   Active Hospital Problems    Diagnosis     **Cellulitis of right toe        Treatment Plan  The patient will be admitted to my service here at [facility].     -Right big toe cellulitis versus diabetic foot rule out osteomyelitis of the big toe  Patient given history of prolonged/chronic wound to the plantar surface of the right big toe, and also a recent surgical manipulation on the third of this month.  Osteomyelitis is highly suspicious, therefore we will get an MRI to rule out osteomyelitis.  Continue patient on vancomycin and add cefepime.  Infectious disease/podiatry will be consulted as appropriate with the report of MRI.    -Diabetes mellitus  Will restart patient on Lantus and add sliding scale    -Paroxysmal atrial fibrillation  Restart patient's Eliquis and metoprolol    Other chronic medical conditions-  Psoriatic arthritis  History of skin cancer  Asthma  Bilateral carotid stenosis  Chronic diastolic congestive heart failure  Coronary artery disease  BPH  GERD  Hyperlipidemia  History of myocardial infarction  History of stroke  Seizure disorder    DVT prophylaxis-Eliquis      Medical Decision Making  Number and Complexity of problems: High  Differential Diagnosis: Possible right big toe osteomyelitis    Conditions and Status        Condition is  unchanged.     [facility] Data  External documents reviewed: No  Cardiac tracing (EKG, telemetry) interpretation: No  Radiology interpretation: Yes  Labs reviewed: Yes  Any tests that were considered but not ordered: None     Decision rules/scores evaluated (example [id number], [place], etc): No     Discussed with: Patient     Care Planning  Shared decision making: Yes  Code status and discussions: Yes    Disposition  Social Determinants of Health that impact treatment or disposition: No  Estimated length of stay is 2 to 3 days.     I confirmed that the patient's advanced care plan is present, code status is documented, and a surrogate decision maker is listed in the patient's medical record.     The patient's surrogate decision maker is wife.     The patient was seen and examined by me on 7/27/2024 at 4:30 PM.    Electronically signed by Jerman Pemberton MD, 07/27/24, 17:42 CDT.

## 2024-07-27 NOTE — PLAN OF CARE
Goal Outcome Evaluation:  Plan of Care Reviewed With: patient        Progress: no change     Pt transferred from ED. A/O x4. No c/o pain. Pt up standby with walker, non weight bearing to right foot. Urinal within reach. Bed alarm set. Call light within reach. Safety maintained. Plan of care ongoing. No changes this shift.

## 2024-07-27 NOTE — PROGRESS NOTES
"Pharmacy Dosing Service  Pharmacokinetics  Vancomycin Initial Evaluation  Assessment/Action/Plan:  Loading dose?: N/A  Current Order:  Vancomycin 1000 mg ivpb once then Vancomycin 1750 mg IVPB every 24 hours  Current end date:08/02/2024  Levels: Obtain Vancomycin trough prior to dose on 07/29/2024 at 2100  Additional antimicrobial agent(s): Cefepime  MRSA Nasal PCR ordered: Yes (Vancomycin indication is pneumonia, bone/joint, SSTI or IAI)    Vancomycin dosage initiated based on population pharmacokinetic parameters. Pharmacy will continue to follow daily and adjust dose accordingly.     Subjective:  Carlos A Boyer Jr. is a 64 y.o. male with a Vancomycin \"Pharmacy to Dose\" consult for the treatment of bone and/or joint infection , day 1 of 7 of treatment.    AUC Model Data:    Loading dose: N/A  Regimen: 1750 mg IV every 24 hours.  Start time: 22:00 on 07/27/2024  Exposure target: AUC24 (range)400-600 mg/L.hr   AUC24,ss: 488 mg/L.hr  PAUC*: 83 %  Ctrough,ss: 12.8 mg/L  Pconc*: 7 %  Tox.: 8 %      Objective:  Ht: 181 cm (71.26\"); Wt: 118 kg (260 lb 2.3 oz)  Estimated Creatinine Clearance: 71.1 mL/min (A) (by C-G formula based on SCr of 1.34 mg/dL (H)).   Creatinine   Date Value Ref Range Status   07/27/2024 1.34 (H) 0.76 - 1.27 mg/dL Final   07/01/2024 1.24 0.76 - 1.27 mg/dL Final   06/22/2024 1.40 (H) 0.76 - 1.27 mg/dL Final   02/14/2024 1.20 0.60 - 1.30 mg/dL Final     Comment:     Serial Number: [id number]:  [id number]   08/25/2023 2.81 (H) 0.60 - 1.30 mg/dL Final   07/18/2023 1.70 (H) 0.60 - 1.30 mg/dL Final     Comment:     Serial Number: [id number]:  [id number]   02/15/2023 1.20 0.60 - 1.30 mg/dL Final     Comment:     Serial Number: [id number]:  [id number]      Lab Results   Component Value Date    WBC 5.19 07/27/2024    WBC 4.94 07/01/2024    WBC 5.26 06/22/2024      Baseline culture results:  Microbiology Results (last 10 days)       ** No results found for the last 240 hours. **            Ellie PARRA" Sabas Reza  07/27/24 18:02 CDT

## 2024-07-28 ENCOUNTER — APPOINTMENT (OUTPATIENT)
Dept: MRI IMAGING | Facility: HOSPITAL | Age: 64
End: 2024-07-28
Payer: OTHER GOVERNMENT

## 2024-07-28 LAB
ANION GAP SERPL CALCULATED.3IONS-SCNC: 13 MMOL/L (ref 5–15)
BUN SERPL-MCNC: 21 MG/DL (ref 8–23)
BUN/CREAT SERPL: 19.3 (ref 7–25)
CALCIUM SPEC-SCNC: 9.3 MG/DL (ref 8.6–10.5)
CHLORIDE SERPL-SCNC: 105 MMOL/L (ref 98–107)
CO2 SERPL-SCNC: 20 MMOL/L (ref 22–29)
CREAT SERPL-MCNC: 1.09 MG/DL (ref 0.76–1.27)
EGFRCR SERPLBLD CKD-EPI 2021: 74.9 ML/MIN/1.73
GLUCOSE BLDC GLUCOMTR-MCNC: 140 MG/DL (ref 70–130)
GLUCOSE BLDC GLUCOMTR-MCNC: 173 MG/DL (ref 70–130)
GLUCOSE BLDC GLUCOMTR-MCNC: 175 MG/DL (ref 70–130)
GLUCOSE BLDC GLUCOMTR-MCNC: 212 MG/DL (ref 70–130)
GLUCOSE SERPL-MCNC: 184 MG/DL (ref 65–99)
HBA1C MFR BLD: 7 % (ref 4.8–5.6)
MAGNESIUM SERPL-MCNC: 1.9 MG/DL (ref 1.6–2.4)
POTASSIUM SERPL-SCNC: 4.5 MMOL/L (ref 3.5–5.2)
SODIUM SERPL-SCNC: 138 MMOL/L (ref 136–145)

## 2024-07-28 PROCEDURE — 73718 MRI LOWER EXTREMITY W/O DYE: CPT

## 2024-07-28 PROCEDURE — 82948 REAGENT STRIP/BLOOD GLUCOSE: CPT

## 2024-07-28 PROCEDURE — 63710000001 INSULIN GLARGINE PER 5 UNITS: Performed by: INTERNAL MEDICINE

## 2024-07-28 PROCEDURE — 25010000002 VANCOMYCIN 10 G RECONSTITUTED SOLUTION: Performed by: INTERNAL MEDICINE

## 2024-07-28 PROCEDURE — 83036 HEMOGLOBIN GLYCOSYLATED A1C: CPT | Performed by: INTERNAL MEDICINE

## 2024-07-28 PROCEDURE — 25010000002 CEFEPIME PER 500 MG: Performed by: INTERNAL MEDICINE

## 2024-07-28 PROCEDURE — 25810000003 SODIUM CHLORIDE 0.9 % SOLUTION: Performed by: INTERNAL MEDICINE

## 2024-07-28 PROCEDURE — 80048 BASIC METABOLIC PNL TOTAL CA: CPT | Performed by: INTERNAL MEDICINE

## 2024-07-28 PROCEDURE — 63710000001 INSULIN LISPRO (HUMAN) PER 5 UNITS: Performed by: INTERNAL MEDICINE

## 2024-07-28 PROCEDURE — 83735 ASSAY OF MAGNESIUM: CPT | Performed by: INTERNAL MEDICINE

## 2024-07-28 RX ADMIN — SACUBITRIL AND VALSARTAN 1 TABLET: 97; 103 TABLET, FILM COATED ORAL at 08:37

## 2024-07-28 RX ADMIN — PANTOPRAZOLE SODIUM 40 MG: 40 TABLET, DELAYED RELEASE ORAL at 06:12

## 2024-07-28 RX ADMIN — CEFEPIME 2000 MG: 2 INJECTION, POWDER, FOR SOLUTION INTRAVENOUS at 18:28

## 2024-07-28 RX ADMIN — Medication 10 ML: at 20:56

## 2024-07-28 RX ADMIN — FLUTICASONE PROPIONATE 2 SPRAY: 50 SPRAY, METERED NASAL at 08:38

## 2024-07-28 RX ADMIN — SODIUM CHLORIDE 1250 MG: 9 INJECTION, SOLUTION INTRAVENOUS at 17:30

## 2024-07-28 RX ADMIN — EMPAGLIFLOZIN 10 MG: 10 TABLET, FILM COATED ORAL at 08:37

## 2024-07-28 RX ADMIN — CEFEPIME 2000 MG: 2 INJECTION, POWDER, FOR SOLUTION INTRAVENOUS at 12:51

## 2024-07-28 RX ADMIN — APIXABAN 5 MG: 5 TABLET, FILM COATED ORAL at 08:37

## 2024-07-28 RX ADMIN — Medication 10 ML: at 08:39

## 2024-07-28 RX ADMIN — SPIRONOLACTONE 50 MG: 50 TABLET ORAL at 08:37

## 2024-07-28 RX ADMIN — INSULIN LISPRO 5 UNITS: 100 INJECTION, SOLUTION INTRAVENOUS; SUBCUTANEOUS at 21:07

## 2024-07-28 RX ADMIN — APIXABAN 5 MG: 5 TABLET, FILM COATED ORAL at 21:06

## 2024-07-28 RX ADMIN — SACUBITRIL AND VALSARTAN 1 TABLET: 97; 103 TABLET, FILM COATED ORAL at 20:56

## 2024-07-28 RX ADMIN — ISOSORBIDE MONONITRATE 120 MG: 60 TABLET, EXTENDED RELEASE ORAL at 08:38

## 2024-07-28 RX ADMIN — OXYCODONE HYDROCHLORIDE AND ACETAMINOPHEN 1 TABLET: 5; 325 TABLET ORAL at 14:24

## 2024-07-28 RX ADMIN — LAMOTRIGINE 200 MG: 100 TABLET ORAL at 08:37

## 2024-07-28 RX ADMIN — METOPROLOL TARTRATE 100 MG: 100 TABLET, FILM COATED ORAL at 08:46

## 2024-07-28 RX ADMIN — EZETIMIBE 10 MG: 10 TABLET ORAL at 12:50

## 2024-07-28 RX ADMIN — DOCUSATE SODIUM 100 MG: 100 CAPSULE, LIQUID FILLED ORAL at 08:37

## 2024-07-28 RX ADMIN — TAMSULOSIN HYDROCHLORIDE 0.4 MG: 0.4 CAPSULE ORAL at 08:37

## 2024-07-28 RX ADMIN — INSULIN LISPRO 4 UNITS: 100 INJECTION, SOLUTION INTRAVENOUS; SUBCUTANEOUS at 17:08

## 2024-07-28 RX ADMIN — ROSUVASTATIN CALCIUM 20 MG: 20 TABLET, FILM COATED ORAL at 20:54

## 2024-07-28 RX ADMIN — ASPIRIN 81 MG: 81 TABLET, CHEWABLE ORAL at 08:37

## 2024-07-28 RX ADMIN — LAMOTRIGINE 200 MG: 100 TABLET ORAL at 20:56

## 2024-07-28 RX ADMIN — METFORMIN HCL 1000 MG: 500 TABLET ORAL at 08:38

## 2024-07-28 RX ADMIN — LEVETIRACETAM 2000 MG: 500 TABLET, FILM COATED ORAL at 20:54

## 2024-07-28 RX ADMIN — CEFEPIME 2000 MG: 2 INJECTION, POWDER, FOR SOLUTION INTRAVENOUS at 02:09

## 2024-07-28 RX ADMIN — GABAPENTIN 300 MG: 300 CAPSULE ORAL at 20:54

## 2024-07-28 RX ADMIN — INSULIN LISPRO 3 UNITS: 100 INJECTION, SOLUTION INTRAVENOUS; SUBCUTANEOUS at 08:16

## 2024-07-28 RX ADMIN — LEVETIRACETAM 1500 MG: 500 TABLET, FILM COATED ORAL at 08:37

## 2024-07-28 RX ADMIN — INSULIN GLARGINE 30 UNITS: 100 INJECTION, SOLUTION SUBCUTANEOUS at 21:07

## 2024-07-28 NOTE — PLAN OF CARE
Goal Outcome Evaluation:  Plan of Care Reviewed With: patient        Progress: no change     A/O x4. Pt c/o pain, prn medications given with some relief of symptoms. Pt up standby with walker, non weight bearing to right foot. Urinal within reach. Bed alarm set. Call light within reach. Safety maintained. Plan of care ongoing. No N/V changes this shift.

## 2024-07-28 NOTE — PROGRESS NOTES
"Pharmacy Dosing Service  Pharmacokinetics  Vancomycin Follow-up Evaluation    Assessment/Action/Plan:  Current Order: Vancomycin 1750 mg IVPB every 24 hours  Current end date:8/2/2024  Levels: Vancomycin trough ordered before dose due on 7/29/2024 at 0600  Additional antimicrobial agent(s): Cefepime    Vancomycin adjusted to 1250 mg iv q12h with next dose scheduled at 1800. Pharmacy will continue to follow daily and adjust dose accordingly.     Subjective:  Carlos A Boyer Jr. is a 64 y.o. male currently on Vancomycin for the treatment of bone and/or joint infection, day 2 of 7 of treatment.    AUC Model Data:  Regimen: 1250 mg IV every 12 hours.  Start time: 18:00 on 07/28/2024  Exposure target: AUC24 (range)400-600 mg/L.hr   AUC24,ss: 574 mg/L.hr  PAUC*: 93 %  Ctrough,ss: 18 mg/L  Pconc*: 37 %  Tox.: 14 %      Objective:  Ht: 181 cm (71.26\"); Wt: 118 kg (260 lb 2.3 oz)  Estimated Creatinine Clearance: 87.4 mL/min (by C-G formula based on SCr of 1.09 mg/dL).   Creatinine   Date Value Ref Range Status   07/28/2024 1.09 0.76 - 1.27 mg/dL Final   07/27/2024 1.34 (H) 0.76 - 1.27 mg/dL Final   07/01/2024 1.24 0.76 - 1.27 mg/dL Final   02/14/2024 1.20 0.60 - 1.30 mg/dL Final     Comment:     Serial Number: [id number]:  [id number]   08/25/2023 2.81 (H) 0.60 - 1.30 mg/dL Final   07/18/2023 1.70 (H) 0.60 - 1.30 mg/dL Final     Comment:     Serial Number: [id number]:  [id number]   02/15/2023 1.20 0.60 - 1.30 mg/dL Final     Comment:     Serial Number: [id number]:  [id number]      Lab Results   Component Value Date    WBC 5.19 07/27/2024    WBC 4.94 07/01/2024    WBC 5.26 06/22/2024       No results found for: \"VANCOPEAK\", \"VANCOTROUGH\", \"VANCORANDOM\"    Culture Results:  Microbiology Results (last 10 days)       Procedure Component Value - Date/Time    MRSA Screen, PCR (Inpatient) - Swab, Nares [[id number]]  (Normal) Collected: 07/27/24 2055    Lab Status: Final result Specimen: Swab from Nares Updated: 07/27/24 2211     " MRSA PCR No MRSA Detected    Narrative:      The negative predictive value of this diagnostic test is high and should only be used to consider de-escalating anti-MRSA therapy. A positive result may indicate colonization with MRSA and must be correlated clinically.    Blood Culture - Blood, Arm, Left [[id number]]  (Normal) Collected: 07/27/24 1339    Lab Status: Preliminary result Specimen: Blood from Arm, Left Updated: 07/28/24 1401     Blood Culture No growth at 24 hours    Blood Culture - Blood, Arm, Left [[id number]]  (Normal) Collected: 07/27/24 1230    Lab Status: Preliminary result Specimen: Blood from Arm, Left Updated: 07/28/24 1315     Blood Culture No growth at 24 hours            Donaldo Reza, PharmD   07/28/24 16:35 CDT

## 2024-07-28 NOTE — PLAN OF CARE
Goal Outcome Evaluation:  Plan of Care Reviewed With: patient, spouse        Progress: no change  Outcome Evaluation: Pt A&Ox4. Wife at bedside. VSS. RA. IV abx given as scheduled. MRI screening form complete. Safety maintained. Call light in reach.

## 2024-07-28 NOTE — NURSING NOTE
Patient called out at 2252 and said his wife needed help. I entered the room and the patient's wife was laying on the recliner with the recliner flipped backwards on the floor. I called the charge nurse Quincy and we got the chair out from under her. I assessed her mental status and she was A&O X4. Blood sugar was checked since she is a diabetic. It was 98 and she stated that was normal for her. She refused to be seen in the ER. House supervisor has been called and security was called by house supervisor.

## 2024-07-28 NOTE — PROGRESS NOTES
[facility]  INPATIENT PROGRESS NOTE    Length of Stay: 1  Date of Admission: 7/27/2024  Primary Care Physician: Vinayak Correia PA    Subjective   Chief Complaint: Swelling, pain, erythema right great toe    HPI     Mr. Boyer presented to [facility] emergency room 7/27/2024 after awakening the morning of admission with pain, swelling, redness right great toe.  He has a history of ulcer on the plantar surface of right big toe for 5 years.  Dr. Villa recently took him to surgery on 7/3/2024 for hallux interphalangeal joint arthrodesis with bone graft.  Patient reported wound healing and he was seen at wound care on 7/25 for dressing change.  No drainage or erythema noted at that time. He has a history of psoriatic arthritis, skin cancer, asthma asthma, bilateral carotid stenosis, chronic diastolic heart failure, coronary artery disease, diabetes, paroxysmal atrial fibrillation, myocardial infarction, stroke, seizure disorder.  Patient reported awakening with swelling, redness, pain right great toe and he was concerned for bone infection.  Lactate 2.3, creatinine 1.34, WBC 5.19.  X-ray right foot showed increased soft tissue swelling right great toe correlate for soft tissue infection.  No obvious bony destruction identified.  Postsurgical changes with slight widening of the osteotomy interphalangeal joint level.  No significant bony bridging associated with arthrodesis.  Vancomycin, morphine, Zofran given in ER      Today  Lying in bed.  No oxygen in use.  Wife in room.  Right foot elevated.  Right great toe with erythema, edematous.  No drainage noted at this time.  Continue vancomycin and cefepime.  Check MRI of the foot today.  Consult Dr. Villa but not available until 7/30.  Patient has nausea, vomiting or abdominal pain.  No complaints of chest pain, palpitations or shortness of breath.      Review of Systems   Constitutional:  Positive for  activity change and fatigue.   HENT:  Negative for congestion and trouble swallowing.    Eyes:  Negative for photophobia and visual disturbance.   Respiratory:  Negative for cough, shortness of breath and wheezing.    Cardiovascular:  Negative for chest pain, palpitations and leg swelling.   Gastrointestinal:  Negative for constipation, diarrhea, nausea and vomiting.   Endocrine: Negative for cold intolerance, heat intolerance and polyuria.   Genitourinary:  Negative for dysuria, frequency and urgency.   Musculoskeletal:  Positive for gait problem.   Skin:  Positive for color change (Right great toe.) and wound (Right great toe).   Allergic/Immunologic: Negative for immunocompromised state.   Neurological:  Positive for weakness. Negative for light-headedness.   Hematological:  Negative for adenopathy. Does not bruise/bleed easily.   Psychiatric/Behavioral:  Negative for agitation, behavioral problems and confusion.         All pertinent negatives and positives are as above. All other systems have been reviewed and are negative unless otherwise stated.     Objective    Temp:  [97.4 °F (36.3 °C)-98.1 °F (36.7 °C)] 97.9 °F (36.6 °C)  Heart Rate:  [59-72] 59  Resp:  [16-20] 16  BP: (102-152)/(48-87) 130/60    Physical Exam  Vitals and nursing note reviewed.   Constitutional:       Appearance: He is obese.      Comments: Sitting up in bed.  No oxygen use.  Wife in room.   HENT:      Head: Normocephalic and atraumatic.      Nose: No congestion.      Mouth/Throat:      Pharynx: Oropharynx is clear. No oropharyngeal exudate or posterior oropharyngeal erythema.   Eyes:      Extraocular Movements: Extraocular movements intact.      Pupils: Pupils are equal, round, and reactive to light.   Cardiovascular:      Rate and Rhythm: Normal rate and regular rhythm.      Heart sounds: No murmur heard.  Pulmonary:      Breath sounds: No wheezing, rhonchi or rales.      Comments: No oxygen in use.  Abdominal:      Palpations: Abdomen  is soft.      Tenderness: There is no abdominal tenderness.   Genitourinary:     Comments: Voiding.  Musculoskeletal:         General: Swelling (Right great toe) and tenderness (Right great toe) present.      Cervical back: Normal range of motion and neck supple.   Skin:     Findings: Erythema (Right great toe) present.   Neurological:      General: No focal deficit present.      Mental Status: He is alert and oriented to person, place, and time.   Psychiatric:         Mood and Affect: Mood normal.         Behavior: Behavior normal.         Thought Content: Thought content normal.         Judgment: Judgment normal.           Results Review:  I have reviewed the labs, radiology results, and diagnostic studies.    Laboratory Data:      Results from last 7 days   Lab Units 07/27/24  1230   WBC 10*3/mm3 5.19   HEMOGLOBIN g/dL 14.6   HEMATOCRIT % 43.9   PLATELETS 10*3/mm3 111*        Results from last 7 days   Lab Units 07/28/24  0520 07/27/24  1230   SODIUM mmol/L 138 137   POTASSIUM mmol/L 4.5 4.4   CHLORIDE mmol/L 105 104   CO2 mmol/L 20.0* 19.0*   BUN mg/dL 21 23   CREATININE mg/dL 1.09 1.34*   GLUCOSE mg/dL 184* 243*   CALCIUM mg/dL 9.3 9.5   ALT (SGPT) U/L  --  19         Culture Data:      Microbiology Results (last 10 days)       Procedure Component Value - Date/Time    MRSA Screen, PCR (Inpatient) - Swab, Nares [[id number]]  (Normal) Collected: 07/27/24 2055    Lab Status: Final result Specimen: Swab from Nares Updated: 07/27/24 2211     MRSA PCR No MRSA Detected    Narrative:      The negative predictive value of this diagnostic test is high and should only be used to consider de-escalating anti-MRSA therapy. A positive result may indicate colonization with MRSA and must be correlated clinically.              Radiology Data:   Imaging Results (Last 72 Hours)       Procedure Component Value Units Date/Time    XR Foot 3+ View Right [[id number]] Collected: 07/27/24 1236     Updated: 07/27/24 1242    Narrative:       EXAMINATION: XR FOOT 3+ VW RIGHT- 7/27/2024 12:36 PM     HISTORY: Since surgery possible infection.     REPORT: 3 views of the right foot were obtained.     COMPARISON: Right foot x-rays 7/3/2024.     There is moderate swelling of the right great toe, this appears  increased, no soft tissue gas is identified. There is previous osteotomy  and arthrodesis at the interphalangeal joint of the right great toe with  a single compression screw which appears to be in satisfactory position.  Compared with the recent x-rays there is slight widening of the  osteotomy space. No freddie osseous destruction is identified. Moderate  overgrowth of the first metatarsal head as before. The other joint  spaces are preserved. No acute fracture.       Impression:      1. Increased soft tissue swelling of the right great toe, correlate  clinically for soft tissue infection. No obvious bony destruction is  identified, postsurgical changes as described, with slight widening of  the osteotomy at the interphalangeal joint level. No significant bony  bridging associated with the arthrodesis at this time.     This report was signed and finalized on 7/27/2024 12:39 PM by Dr. Cyril Reyna MD.               Intake/Output    Intake/Output Summary (Last 24 hours) at 7/28/2024 1018  Last data filed at 7/28/2024 0431  Gross per 24 hour   Intake --   Output 1000 ml   Net -1000 ml       Scheduled Meds  apixaban, 5 mg, Oral, BID  aspirin, 81 mg, Oral, Daily  cefepime, 2,000 mg, Intravenous, Q8H  docusate sodium, 100 mg, Oral, Daily  empagliflozin, 10 mg, Oral, Daily  fluticasone, 2 spray, Nasal, Daily  gabapentin, 300 mg, Oral, Nightly  insulin glargine, 30 Units, Subcutaneous, Q12H  insulin lispro, 3-14 Units, Subcutaneous, 4x Daily AC & at Bedtime  isosorbide mononitrate, 120 mg, Oral, Daily  lamoTRIgine, 200 mg, Oral, BID  levETIRAcetam, 1,500 mg, Oral, Daily  levETIRAcetam, 2,000 mg, Oral, Nightly  [Held by provider] metFORMIN, 1,000 mg, Oral, BID  With Meals  metoprolol tartrate, 100 mg, Oral, BID  NON FORMULARY, 75 mg, Oral, TID  pantoprazole, 40 mg, Oral, Q AM  PATIENT SUPPLIED MEDICATION, 10 mg, Oral, Daily  rosuvastatin, 20 mg, Oral, Nightly  sacubitril-valsartan, 1 tablet, Oral, BID  sodium chloride, 10 mL, Intravenous, Q12H  spironolactone, 50 mg, Oral, Daily  tamsulosin, 0.4 mg, Oral, Daily  vancomycin, 1,750 mg, Intravenous, Q24H        I have reviewed the patient current medications.     Assessment/Plan     Active Hospital Problems    Diagnosis     **Cellulitis of right toe     Chronic anticoagulation     Chronic diastolic congestive heart failure with preserved ejection fraction     Primary hypertension     Type 2 diabetes mellitus with circulatory disorder, with long-term current use of insulin        Treatment Plan:    1.  Cellulitis right great toe.  Awoke 7/27 with pain, swelling, erythema right great toe.  Status post right hallux interphalangeal joint arthrodesis with bone graft 7/3 Dr. Villa.  Patient followed in wound care clinic on 7/25 without drainage or erythema.  X-ray right foot shows soft tissue swelling right great toe.  Continue vancomycin and cefepime.  Check MRI right foot.  Follow-up blood cultures.  Check CBC in AM.    2.  Diabetes mellitus type 2 with long-term use of insulin.  A1c 7 on 7/28/2024.  Accu-Cheks with sliding scale insulin coverage.  Glucoses 179, 184, 175. Glarigne 30 units twice daily.  Takes glargine twice daily and Humalog 3 times daily at home.  Continue Jardiance.  Hold metformin.    3.  Primary hypertension.  Blood pressure 130/60.  Continue metoprolol    4.  Chronic diastolic heart failure with preserved ejection fraction.  EF 61-65% per echo 4/18/2023.  No exacerbation of symptoms.  Continue aspirin, statin, beta-blocker, Entresto, spironolactone, Jardiance.    5.  History of coronary artery disease status post stents..  No complaints of chest pain.  Continue aspirin, Eliquis, statin, beta-blocker,  Entresto, isosorbide    6.  Chronic anticoagulation previous history of atrial flutter.  Maintaining normal sinus rhythm.    7.  Eliquis serves as DVT prophylaxis.    Medical Decision Making  Number and Complexity of problems: 6  Cellulitis right great toe: Acute, high complexity poses threat to life and bodily function  Diabetes mellitus type II with insulin: Chronic, high complexity, not at baseline  Primary hypertension: Chronic, moderate complexity, stable  Chronic diastolic heart failure with preserved ejection fraction: Chronic, moderate complexity, stable  History of coronary artery disease status post stents: Chronic, moderate complexity, stable  Chronic anticoagulation: Chronic, moderate complexity, stable    Differential Diagnosis: Possible osteomyelitis right great toe    Conditions and Status        Condition is worsening.     [facility] Data  External documents reviewed: Epic.  Reviewed wound care notes, surgical procedure note 7/2/2024  Cardiac tracing (EKG, telemetry) interpretation: Personally reviewed EKG from 7/2/2024 showing normal sinus rhythm first-degree, heart rate 67 bpm, SD interval 246 ms.  Radiology interpretation: Radiology interpretation right foot x-ray  Labs reviewed:   BMP 7/28/2024.  Repeat BMP in AM.  CBC 7/28/2024.  Repeat CBC in AM.  A1c, glucose 175, 179, 170  CRP, lactate, procalcitonin    Any tests that were considered but not ordered: None     Decision rules/scores evaluated (example [id number], Wells, etc): None     Discussed with: Dr. Pemberton, patient and wife     Care Planning  Shared decision making: Dr. Pemberton, patient and wife.  Patient agrees to IV antibiotics, check MRI right foot, Accu-Cheks with sliding scale insulin coverage.  Code status and discussions: Full code  Patient surrogate decision maker is his wife, Danielarebecca Santos  Social Determinants of Health that impact treatment or disposition: None  I expect the patient to be discharged to home in 2-3 days.      Electronically signed by TEENA Rowe, 07/28/24, 10:18 CDT.    The above documentation resulted from a face-to-face encounter by me Meghan DICKINSON, [facility].

## 2024-07-29 LAB
ANION GAP SERPL CALCULATED.3IONS-SCNC: 11 MMOL/L (ref 5–15)
BASOPHILS # BLD AUTO: 0.02 10*3/MM3 (ref 0–0.2)
BASOPHILS NFR BLD AUTO: 0.4 % (ref 0–1.5)
BUN SERPL-MCNC: 21 MG/DL (ref 8–23)
BUN/CREAT SERPL: 19.6 (ref 7–25)
CALCIUM SPEC-SCNC: 9.3 MG/DL (ref 8.6–10.5)
CHLORIDE SERPL-SCNC: 103 MMOL/L (ref 98–107)
CO2 SERPL-SCNC: 22 MMOL/L (ref 22–29)
CREAT SERPL-MCNC: 1.07 MG/DL (ref 0.76–1.27)
DEPRECATED RDW RBC AUTO: 42.8 FL (ref 37–54)
EGFRCR SERPLBLD CKD-EPI 2021: 76.5 ML/MIN/1.73
EOSINOPHIL # BLD AUTO: 0.18 10*3/MM3 (ref 0–0.4)
EOSINOPHIL NFR BLD AUTO: 3.5 % (ref 0.3–6.2)
ERYTHROCYTE [DISTWIDTH] IN BLOOD BY AUTOMATED COUNT: 13 % (ref 12.3–15.4)
GLUCOSE BLDC GLUCOMTR-MCNC: 176 MG/DL (ref 70–130)
GLUCOSE BLDC GLUCOMTR-MCNC: 183 MG/DL (ref 70–130)
GLUCOSE BLDC GLUCOMTR-MCNC: 186 MG/DL (ref 70–130)
GLUCOSE BLDC GLUCOMTR-MCNC: 209 MG/DL (ref 70–130)
GLUCOSE SERPL-MCNC: 225 MG/DL (ref 65–99)
HCT VFR BLD AUTO: 39.6 % (ref 37.5–51)
HGB BLD-MCNC: 13 G/DL (ref 13–17.7)
IMM GRANULOCYTES # BLD AUTO: 0.01 10*3/MM3 (ref 0–0.05)
IMM GRANULOCYTES NFR BLD AUTO: 0.2 % (ref 0–0.5)
LYMPHOCYTES # BLD AUTO: 1.21 10*3/MM3 (ref 0.7–3.1)
LYMPHOCYTES NFR BLD AUTO: 23.4 % (ref 19.6–45.3)
MCH RBC QN AUTO: 29.7 PG (ref 26.6–33)
MCHC RBC AUTO-ENTMCNC: 32.8 G/DL (ref 31.5–35.7)
MCV RBC AUTO: 90.6 FL (ref 79–97)
MONOCYTES # BLD AUTO: 0.43 10*3/MM3 (ref 0.1–0.9)
MONOCYTES NFR BLD AUTO: 8.3 % (ref 5–12)
NEUTROPHILS NFR BLD AUTO: 3.32 10*3/MM3 (ref 1.7–7)
NEUTROPHILS NFR BLD AUTO: 64.2 % (ref 42.7–76)
PLATELET # BLD AUTO: 93 10*3/MM3 (ref 140–450)
PMV BLD AUTO: 11.1 FL (ref 6–12)
POTASSIUM SERPL-SCNC: 4.5 MMOL/L (ref 3.5–5.2)
RBC # BLD AUTO: 4.37 10*6/MM3 (ref 4.14–5.8)
SODIUM SERPL-SCNC: 136 MMOL/L (ref 136–145)
VANCOMYCIN TROUGH SERPL-MCNC: 8.2 MCG/ML (ref 5–20)
WBC NRBC COR # BLD AUTO: 5.17 10*3/MM3 (ref 3.4–10.8)

## 2024-07-29 PROCEDURE — 63710000001 INSULIN LISPRO (HUMAN) PER 5 UNITS: Performed by: INTERNAL MEDICINE

## 2024-07-29 PROCEDURE — 80202 ASSAY OF VANCOMYCIN: CPT | Performed by: INTERNAL MEDICINE

## 2024-07-29 PROCEDURE — 80048 BASIC METABOLIC PNL TOTAL CA: CPT | Performed by: NURSE PRACTITIONER

## 2024-07-29 PROCEDURE — 85025 COMPLETE CBC W/AUTO DIFF WBC: CPT | Performed by: NURSE PRACTITIONER

## 2024-07-29 PROCEDURE — 25010000002 VANCOMYCIN 10 G RECONSTITUTED SOLUTION: Performed by: INTERNAL MEDICINE

## 2024-07-29 PROCEDURE — 63710000001 INSULIN GLARGINE PER 5 UNITS: Performed by: INTERNAL MEDICINE

## 2024-07-29 PROCEDURE — 25810000003 SODIUM CHLORIDE 0.9 % SOLUTION: Performed by: INTERNAL MEDICINE

## 2024-07-29 PROCEDURE — 82948 REAGENT STRIP/BLOOD GLUCOSE: CPT

## 2024-07-29 PROCEDURE — 25010000002 CEFEPIME PER 500 MG: Performed by: INTERNAL MEDICINE

## 2024-07-29 PROCEDURE — 63710000001 INSULIN GLARGINE PER 5 UNITS: Performed by: NURSE PRACTITIONER

## 2024-07-29 RX ORDER — DOCUSATE SODIUM 100 MG/1
100 CAPSULE, LIQUID FILLED ORAL DAILY PRN
COMMUNITY

## 2024-07-29 RX ORDER — POLYETHYLENE GLYCOL 3350 17 G/17G
17 POWDER, FOR SOLUTION ORAL DAILY
COMMUNITY

## 2024-07-29 RX ORDER — TRIAMCINOLONE ACETONIDE 1 MG/G
1 OINTMENT TOPICAL 2 TIMES DAILY
COMMUNITY

## 2024-07-29 RX ADMIN — SODIUM CHLORIDE 1750 MG: 9 INJECTION, SOLUTION INTRAVENOUS at 06:22

## 2024-07-29 RX ADMIN — INSULIN GLARGINE 30 UNITS: 100 INJECTION, SOLUTION SUBCUTANEOUS at 12:17

## 2024-07-29 RX ADMIN — APIXABAN 5 MG: 5 TABLET, FILM COATED ORAL at 20:37

## 2024-07-29 RX ADMIN — INSULIN LISPRO 3 UNITS: 100 INJECTION, SOLUTION INTRAVENOUS; SUBCUTANEOUS at 12:17

## 2024-07-29 RX ADMIN — CEFEPIME 2000 MG: 2 INJECTION, POWDER, FOR SOLUTION INTRAVENOUS at 02:22

## 2024-07-29 RX ADMIN — CEFEPIME 2000 MG: 2 INJECTION, POWDER, FOR SOLUTION INTRAVENOUS at 10:32

## 2024-07-29 RX ADMIN — APIXABAN 5 MG: 5 TABLET, FILM COATED ORAL at 08:01

## 2024-07-29 RX ADMIN — LAMOTRIGINE 200 MG: 100 TABLET ORAL at 20:36

## 2024-07-29 RX ADMIN — LAMOTRIGINE 200 MG: 100 TABLET ORAL at 08:00

## 2024-07-29 RX ADMIN — TAMSULOSIN HYDROCHLORIDE 0.4 MG: 0.4 CAPSULE ORAL at 08:00

## 2024-07-29 RX ADMIN — EZETIMIBE 10 MG: 10 TABLET ORAL at 08:01

## 2024-07-29 RX ADMIN — INSULIN GLARGINE 40 UNITS: 100 INJECTION, SOLUTION SUBCUTANEOUS at 20:35

## 2024-07-29 RX ADMIN — EMPAGLIFLOZIN 10 MG: 10 TABLET, FILM COATED ORAL at 08:00

## 2024-07-29 RX ADMIN — CEFEPIME 2000 MG: 2 INJECTION, POWDER, FOR SOLUTION INTRAVENOUS at 21:30

## 2024-07-29 RX ADMIN — SACUBITRIL AND VALSARTAN 1 TABLET: 97; 103 TABLET, FILM COATED ORAL at 08:00

## 2024-07-29 RX ADMIN — GABAPENTIN 300 MG: 300 CAPSULE ORAL at 20:37

## 2024-07-29 RX ADMIN — DOCUSATE SODIUM 100 MG: 100 CAPSULE, LIQUID FILLED ORAL at 08:00

## 2024-07-29 RX ADMIN — ISOSORBIDE MONONITRATE 120 MG: 60 TABLET, EXTENDED RELEASE ORAL at 08:00

## 2024-07-29 RX ADMIN — LEVETIRACETAM 1500 MG: 500 TABLET, FILM COATED ORAL at 08:00

## 2024-07-29 RX ADMIN — ASPIRIN 81 MG: 81 TABLET, CHEWABLE ORAL at 08:00

## 2024-07-29 RX ADMIN — INSULIN LISPRO 3 UNITS: 100 INJECTION, SOLUTION INTRAVENOUS; SUBCUTANEOUS at 18:04

## 2024-07-29 RX ADMIN — SPIRONOLACTONE 50 MG: 50 TABLET ORAL at 08:00

## 2024-07-29 RX ADMIN — FLUTICASONE PROPIONATE 2 SPRAY: 50 SPRAY, METERED NASAL at 08:02

## 2024-07-29 RX ADMIN — INSULIN LISPRO 5 UNITS: 100 INJECTION, SOLUTION INTRAVENOUS; SUBCUTANEOUS at 20:36

## 2024-07-29 RX ADMIN — METOPROLOL TARTRATE 100 MG: 100 TABLET, FILM COATED ORAL at 20:37

## 2024-07-29 RX ADMIN — OXYCODONE HYDROCHLORIDE AND ACETAMINOPHEN 1 TABLET: 5; 325 TABLET ORAL at 10:37

## 2024-07-29 RX ADMIN — Medication 10 ML: at 08:02

## 2024-07-29 RX ADMIN — ROSUVASTATIN CALCIUM 20 MG: 20 TABLET, FILM COATED ORAL at 20:37

## 2024-07-29 RX ADMIN — METOPROLOL TARTRATE 100 MG: 100 TABLET, FILM COATED ORAL at 08:00

## 2024-07-29 RX ADMIN — SACUBITRIL AND VALSARTAN 1 TABLET: 97; 103 TABLET, FILM COATED ORAL at 20:37

## 2024-07-29 RX ADMIN — INSULIN LISPRO 3 UNITS: 100 INJECTION, SOLUTION INTRAVENOUS; SUBCUTANEOUS at 08:01

## 2024-07-29 RX ADMIN — LEVETIRACETAM 2000 MG: 500 TABLET, FILM COATED ORAL at 20:36

## 2024-07-29 RX ADMIN — SODIUM CHLORIDE 1750 MG: 9 INJECTION, SOLUTION INTRAVENOUS at 21:30

## 2024-07-29 RX ADMIN — OXYCODONE HYDROCHLORIDE AND ACETAMINOPHEN 1 TABLET: 5; 325 TABLET ORAL at 02:34

## 2024-07-29 RX ADMIN — Medication 10 ML: at 20:37

## 2024-07-29 RX ADMIN — PANTOPRAZOLE SODIUM 40 MG: 40 TABLET, DELAYED RELEASE ORAL at 06:21

## 2024-07-29 NOTE — PAYOR COMM NOTE
"Carlos A Goldstein  (64 y.o. Male) [id number].   ADMIT 07/27 [facility] [phone]      Date of Birth   1960    Social Security Number       Address   [address] [place], [place] [address]    Home Phone   [phone]    MRN   [id number]       Anglican   [facility]    Marital Status                               Admission Date   7/27/24    Admission Type   Emergency    Admitting Provider   Willard Montana DO    Attending Provider   Willard Montana DO    Department, Room/Bed   [facility] 3A, 331/1       Discharge Date       Discharge Disposition       Discharge Destination                                 Attending Provider: Willard Montana DO    Allergies: Flagyl [Metronidazole], Atorvastatin, Ciprofloxacin    Isolation: None   Infection: COVID (History) (04/29/21)   Code Status: CPR    Ht: 181 cm (71.26\")   Wt: 118 kg (260 lb 2.3 oz)    Admission Cmt: None   Principal Problem: Cellulitis of right toe [L03.031]                   Active Insurance as of 7/27/2024       Primary Coverage       Payor Plan Insurance Group Employer/Plan Group    [facility] DEPT [id number]        Payor Plan Address Payor Plan Phone Number Payor Plan Fax Number Effective Dates    [facility] OFFICE OF COMMUNITY CARE [phone]  5/14/2024 - None Entered    [address] [address]       [facility] [id number]         Subscriber Name Subscriber Birth Date Member ID       CARLOS A GOLDSTEIN  1960 [id number]               Secondary Coverage       Payor Plan Insurance Group Employer/Plan Group    [facility] CCN OPTUM        Payor Plan Address Payor Plan Phone Number Payor Plan Fax Number Effective Dates    [address] [phone]  6/1/2020 - None Entered    [facility] [id number]         Subscriber Name Subscriber Birth Date Member ID       CARLOS A GOLDSTEIN  1960 [id number]                     Emergency Contacts        " (Rel.) Home Phone Work Phone Mobile Phone    Daniela Boyer (Spouse) [phone]                 History & Physical        Jerman Pemberton MD at 07/27/24 1742              [facility] Medicine Services  HISTORY AND PHYSICAL    Date of Admission: 7/27/2024  Primary Care Physician: Vinayak Correia PA    Subjective   Primary Historian: Patient    Chief Complaint: Swollen/red right big toe    History of Present Illness  Mr. Boyer is a 64-year-old male with past medical history of psoriatic arthritis, skin cancer, asthma, bilateral carotid stenosis, chronic diastolic congestive heart failure, coronary artery disease, diabetes mellitus, BPH, GERD, hyperlipidemia, paroxysmal atrial fibrillation, history of myocardial infarction, stroke, seizure disorder, psoriasis, diverticulosis and kathia bullosa, who presented to the emergency room with complaint of swelling and redness of right big toe.  Patient said he has had ulcer on the plantar surface of the right big toe for about 5 years, Dr. Villa recently took over care of managing his wound and had a procedure done on 7/3/2024 and thereafter the wound healed.  This morning he woke up with pain, swelling and redness of the right big toe, prompting his emergency room visit.  In the emergency room x-ray was done and showed soft tissue swelling without any obvious bony involvement.  He was given 1 dose of vancomycin in the emergency room.    Review of Systems   Otherwise complete ROS reviewed and negative except as mentioned in the HPI.    Past Medical History:   Past Medical History:   Diagnosis Date    Arthritis     Asthma     Cancer     skin    Carotid disease, bilateral     Cellulitis     right foot - on antibiotics 6-    Chest pain     Chronic diastolic congestive heart failure 09/07/2019    Chronic sinusitis     Kathia bullosa     Coronary artery disease involving native coronary artery of native  heart with unstable angina pectoris 01/17/2017    Deviated septum     Diabetes mellitus     Difficulty urinating     Diverticulitis     Enlarged prostate     Fatty liver     GERD (gastroesophageal reflux disease)     [place] (hard of hearing)     Does have hearing aids    Hyperlipidemia LDL goal <70 02/02/2017    Hypertrophy of nasal turbinates     Keratoderma     Kidney stone     Lung nodules     lower right lung    Migraine     Murmur, heart     Myocardial infarction     Obesity     Paroxysmal atrial fibrillation 07/11/2019    Personal history of COVID-19 07/2021    PONV (postoperative nausea and vomiting)     Primary hypertension 10/16/2016    Psoriasis     Seizures     Sinus congestion     Skin cancer     Sleep apnea     not using cpap    SOB (shortness of breath)     Stroke     mild weakness on right side    UTI (urinary tract infection)      Past Surgical History:  Past Surgical History:   Procedure Laterality Date    CARDIAC CATHETERIZATION  01/2016    Dr. Broadbent; widely patent previously placed stents in the left anterior descending and obstructive disease involving the diagonal branch which was treated medically    CARDIAC CATHETERIZATION N/A 07/14/2017    Procedure: Left Heart Cath;  Surgeon: Wade Ramey MD;  Location:  [facility] CATH [facility]VASIVE LOC[facility];  Service:     CARDIAC CATHETERIZATION Left 10/15/2018    Procedure: Cardiac Catheterization/Vascular Study;  Surgeon: Wade Ramey MD;  Location:  [facility] CATH INV[facility];  Service: Cardiology    CARDIAC CATHETERIZATION  10/15/2018    Procedure: Functional Flow [place];  Surgeon: Wade Ramey MD;  Location:  [facility] CATH INV[facility];  Service: Cardiology    CARDIAC CATHETERIZATION N/A 10/15/2018    Procedure: Left ventriculography;  Surgeon: Wade Ramey MD;  Location:  [facility] CATH [facility];  Service: Cardiology    CARDIAC CATHETERIZATION Left 06/26/2019    Procedure: Cardiac Catheterization/Vascular Study VEL OK  HE WILL WAIT 1 YEAR FOR  SHOULDER SURGERY ;  Surgeon: Wade Ramey MD;  Location:  [facility];  Service: Cardiology    CARDIAC CATHETERIZATION Left 04/30/2021    Procedure: Coronary angiography;  Surgeon: Sahil Llamas MD;  Location:  [facility] CATH INV[facility];  Service: Cardiology;  Laterality: Left;    CARDIAC CATHETERIZATION N/A 04/30/2021    Procedure: Percutaneous Coronary Intervention;  Surgeon: Sahil Llamas MD;  Location:  [facility] C[facility];  Service: Cardiology;  Laterality: N/A;    CARDIAC CATHETERIZATION N/A 11/09/2022    Procedure: Left Heart Cath with SVGs;  Surgeon: Wade Ramey MD;  Location:  [facility] CATH [facility];  Service: Cardiology;  Laterality: N/A;    CARPAL TUNNEL RELEASE      January 2024 and pther hand February 2024    CHOLECYSTECTOMY      CHOLECYSTECTOMY WITH INTRAOPERATIVE CHOLANGIOGRAM N/A 08/01/2018    Procedure: CHOLECYSTECTOMY LAPAROSCOPIC INTRAOPERATIVE CHOLANGIOGRAM;  Surgeon: Shane Ann MD;  Location:  [facility] OR;  Service: General    COLONOSCOPY N/A 07/14/2020    Procedure: COLONOSCOPY WITH ANESTHESIA;  Surgeon: Anupam Morales DO;  Location: [facility] ENDOSCOPY;  Service: Gastroenterology;  Laterality: N/A;  pre: abdominal pain  post: diverticulosis  Vinayak Correia PA    CORONARY ANGIOPLASTY      CORONARY ARTERY BYPASS GRAFT N/A 07/06/2019    Procedure: CABG X2 WITH LIMA, LEFT LEG OVH, AND PLACEMENT OF LEFT FEMORAL ARTERIAL LINE;  Surgeon: Steven Tang MD;  Location:  [facility] OR;  Service: Cardiothoracic    CORONARY STENT PLACEMENT      x 6    CYSTOSCOPY TRANSURETHRAL RESECTION OF PROSTATE N/A 01/23/2023    Procedure: CYSTOSCOPY TRANSURETHRAL RESECTION OF PROSTATE;  Surgeon: Latrell Pope MD;  Location:  [facility] OR;  Service: Urology;  Laterality: N/A;    ENDOSCOPIC FUNCTIONAL SINUS SURGERY (FESS) Bilateral 12/13/2017    Procedure: PROCEDURE PERFORMED:  Bilateral functional endoscopic anterior ethmoidectomy with bilateral middle meatal  antrostomy Septoplasty Right kathia bullosa resection Bilateral inferior turbinate reduction via Coblation;  Surgeon: Mayank Ibarra MD;  Location: [facility] OR;  Service:     ENDOSCOPY N/A 07/30/2018    Procedure: ESOPHAGOGASTRODUODENOSCOPY WITH ANESTHESIA;  Surgeon: Benitez Mas MD;  Location: [facility] ENDOSCOPY;  Service: Gastroenterology    ENDOSCOPY N/A 07/14/2020    Procedure: ESOPHAGOGASTRODUODENOSCOPY WITH ANESTHESIA;  Surgeon: Anupam Morales DO;  Location: [facility] ENDOSCOPY;  Service: Gastroenterology;  Laterality: N/A;  pre: abdominal pain  post: esophagitis  Vinayak Correia, PA    HERNIA REPAIR      x2 inguinal area    KIDNEY STONE SURGERY      KNEE ARTHROSCOPY Right 03/01/2022    Procedure: RIGHT KNEE PARTIAL LATERAL MENISCECTOMY;  Surgeon: Pedro Pablo Song MD;  Location: [facility] OR;  Service: Orthopedics;  Laterality: Right;    KNEE SURGERY Right     OTHER SURGICAL HISTORY      urolift    PROSTATE SURGERY      Dr. Badillo - 2017    PULMONARY ARTERY PRESSURE SENSOR IMPLANT N/A 05/08/2023    Procedure: PA Pressure Sensor Implant;  Surgeon: Wade Ramey MD;  Location: [facility] CATH INVASIVE LOCATION;  Service: Cardiology;  Laterality: N/A;    ROTATOR CUFF REPAIR Right     SLEEP ENDOSCOPY N/A 02/27/2023    Procedure: Videosleep endoscopy;  Surgeon: Mayank Ibarra MD;  Location: [facility] OR;  Service: ENT;  Laterality: N/A;    THUMB AMPUTATION Left     partial    TOE FUSION Right 7/3/2024    Procedure: Hallux Interphalangeal Joint Arthrodesis, Bone Graft [place] - Right Foot;  Surgeon: Hernan Villa DPM;  Location: [facility] OR;  Service: Podiatry;  Laterality: Right;    TOE SURGERY      great toe     Social History:  reports that he quit smoking about 31 years ago. His smoking use included cigarettes and cigars. He started smoking about 49 years ago. He has a 27 pack-year smoking history. He has been exposed to tobacco smoke. He quit smokeless tobacco use about 15 years ago.  His  smokeless tobacco use included chew. He reports that he does not drink alcohol and does not use drugs.    Family History: family history includes Asthma in his mother; COPD in his mother; Colon cancer in his maternal grandmother and paternal uncle; Colon polyps in his maternal grandmother; Heart disease in his father; Hypertension in his mother; No Known Problems in his sister and sister; Prostate cancer in his maternal grandfather.       Allergies:  Allergies   Allergen Reactions    Flagyl [Metronidazole] Hives    Atorvastatin Other (See Comments) and Myalgia      - LIPITOR -   Muscle cramps    Ciprofloxacin Hives      - CIPRO -        Medications:  Prior to Admission medications    Medication Sig Start Date End Date Taking? Authorizing Provider   acetaminophen (TYLENOL) 325 MG tablet Take 1 tablet by mouth Every 6 (Six) Hours As Needed for Mild Pain.    Rohan Christina MD   albuterol (PROVENTIL HFA;VENTOLIN HFA) 108 (90 BASE) MCG/ACT inhaler Inhale 2 puffs Every 6 (Six) Hours As Needed for Wheezing.    Rohan Christina MD   apixaban (ELIQUIS) 5 MG tablet tablet Take 1 tablet by mouth 2 (Two) Times a Day.    Rohan Christina MD   Aspirin 81 MG capsule Take 81 mg by mouth Daily. Dr. Ramey or Rohan Hanson MD   calcium polycarbophil (FIBERCON) 625 MG tablet Take 1 tablet by mouth Daily As Needed.    Rohan Christina MD   carboxymethylcellulose (REFRESH PLUS) 0.5 % solution Administer 1 drop to both eyes 4 (Four) Times a Day As Needed for Dry Eyes.    Rohan Christina MD   docusate sodium (COLACE) 100 MG capsule Take 1 capsule by mouth Daily. 7/3/24   Hernan Villa DPM   empagliflozin (JARDIANCE) 10 MG tablet tablet Take 1 tablet by mouth Daily.    Rohan Christina MD   ezetimibe (ZETIA) 10 MG tablet Take 1 tablet by mouth Daily.    Rohan Christina MD   fluticasone (FLONASE) 50 MCG/ACT nasal spray 2 sprays into the nostril(s) as directed by provider Daily.     Rohan Christina MD   furosemide (Lasix) 40 MG tablet Take 1 tablet by mouth Daily As Needed (take as needed for weight gain more than 2 pounds in a day or more than than 3 pounds in 2 days.). 4/19/23   Nick Carolina APRN   gabapentin (NEURONTIN) 300 MG capsule Take 1 capsule by mouth Every Night.    Rohan Christina MD   guaiFENesin (MUCINEX) 600 MG 12 hr tablet Take 2 tablets by mouth Daily As Needed for Congestion.    Rohan Christina MD   HYDROcodone-acetaminophen (NORCO) 7.5-325 MG per tablet Take 1 tablet by mouth Every 6 (Six) Hours As Needed for Moderate Pain (Pain). 7/3/24   Hernan Villa DPM   insulin aspart (novoLOG FLEXPEN) 100 UNIT/ML solution pen-injector sc pen Inject 50 Units under the skin into the appropriate area as directed 3 (Three) Times a Day With Meals. Can use 5 to 6 more units if needed in early evening.    Rohan Christina MD   INSULIN GLARGINE-YFGN SC Inject 100 Units under the skin into the appropriate area as directed Every Night.    Rohan Christina MD   INSULIN GLARGINE-YFGN SC Inject 80 Units under the skin into the appropriate area as directed Daily.    Rohan Christina MD   isosorbide mononitrate (IMDUR) 120 MG 24 hr tablet Take 1 tablet by mouth Daily. 1/11/22   Agnieszka Jeff APRN   lamoTRIgine (LaMICtal) 200 MG tablet Take 1 tablet by mouth 2 (Two) Times a Day. 8/22/23   Flako Freeman APRN   levETIRAcetam (KEPPRA) 500 MG tablet Take 3 tablets by mouth Every Morning. 8/22/23   Flako Freeman APRN   levETIRAcetam (KEPPRA) 500 MG tablet Take 4 tablets by mouth Every Night. 8/22/23   Flako Freeman APRN   metFORMIN (GLUCOPHAGE) 1000 MG tablet Take 1 tablet by mouth 2 (Two) Times a Day With Meals.    Rohan Christina MD   metoprolol tartrate (LOPRESSOR) 100 MG tablet Take 1 tablet by mouth 2 (Two) Times a Day.    Rohan Christina MD   Multiple Vitamin (MULTI VITAMIN PO) Take 1 tablet by mouth Daily.    [place]  MD Rohan   nitroglycerin (NITROSTAT) 0.4 MG SL tablet Place 1 tablet under the tongue Every 5 (Five) Minutes As Needed for Chest Pain. Take no more than 3 doses in 15 minutes. 4/4/24   Ben Burden APRN   ondansetron (Zofran) 4 MG tablet Take 1 tablet by mouth Every 8 (Eight) Hours As Needed for Nausea or Vomiting. 7/3/24   Hernan Villa DPM   pantoprazole (PROTONIX) 40 MG EC tablet Take 1 tablet by mouth 2 (Two) Times a Day.    Rohan Christina MD   polyethylene glycol (MIRALAX) 17 GM/SCOOP powder Take two scoops twice a day until bowel movements normalize 9/21/23   Dewayne Pandey MD   psyllium (METAMUCIL) 58.6 % packet Take 1 packet by mouth Daily As Needed (constipation).    Rohan Christina MD   Rimegepant Sulfate (Nurtec) 75 MG tablet dispersible tablet Take 1 tablet by mouth 3 (Three) Times a Day As Needed (migraine).    Rohan Christina MD   rosuvastatin (CRESTOR) 40 MG tablet Take 0.5 tablets by mouth Every Night.    Rohan Christina MD   sacubitril-valsartan (Entresto)  MG tablet Take 1 tablet by mouth 2 (Two) Times a Day.    Rohan Christina MD   Semaglutide,0.25 or 0.5MG/DOS, (Ozempic, 0.25 or 0.5 MG/DOSE,) 2 MG/1.5ML solution pen-injector Inject 0.5 mg under the skin into the appropriate area as directed 1 (One) Time Per Week. Monday    Rohan Christina MD   spironolactone (ALDACTONE) 50 MG tablet Take 1 tablet by mouth Daily.  Patient not taking: Reported on 7/3/2024    Rohan Christina MD   sulfamethoxazole-trimethoprim (BACTRIM,SEPTRA) 400-80 MG tablet Take 1 tablet by mouth Every 12 (Twelve) Hours.  Patient not taking: Reported on 7/3/2024 6/21/24   Rohan Christina MD   tamsulosin (FLOMAX) 0.4 MG capsule 24 hr capsule Take 1 capsule by mouth Daily.    Rohan Christina MD     I have utilized all available immediate resources to obtain, update, or review the patient's current medications (including all prescriptions,  "over-the-counter products, herbals, cannabis/cannabidiol products, and vitamin/mineral/dietary (nutritional) supplements).    Objective     Vital Signs: /75 (BP Location: Right arm, Patient Position: Sitting)   Pulse 65   Temp 97.6 °F (36.4 °C) (Oral)   Resp 18   Ht 181 cm (71.26\")   Wt 118 kg (260 lb 2.3 oz)   SpO2 98%   BMI 36.02 kg/m²   Physical Exam   GEN: Awake, alert, interactive, in NAD  HEENT: Atraumatic, PERRLA, EOMI, Anicteric, Trachea midline  Lungs: CTAB, no wheezing/rales/rhonchi  Heart: RRR, +S1/s2, no rub  ABD: soft, nt/nd, +BS, no guarding/rebound  Extremities: right big toe is swollen, erythematous and very tender.  No obvious wound but toe looked as if it has fungal infection.  Skin: no rashes or lesions  Neuro: AAOx3, no focal deficits  Psych: normal mood & affect       Results Reviewed:  Lab Results (last 24 hours)       Procedure Component Value Units Date/Time    POC Glucose Once [[id number]]  (Abnormal) Collected: 07/27/24 1712    Specimen: Blood Updated: 07/27/24 1723     Glucose 170 mg/dL      Comment: : [id number] Shane EmilyMeter ID: [id number]       STAT Lactic Acid, Reflex [[id number]]  (Normal) Collected: 07/27/24 1638    Specimen: Blood Updated: 07/27/24 1708     Lactate 1.4 mmol/L     Blood Culture - Blood, Arm, Left [[id number]] Collected: 07/27/24 1339    Specimen: Blood from Arm, Left Updated: 07/27/24 1350    Procalcitonin [[id number]]  (Normal) Collected: 07/27/24 1230    Specimen: Blood Updated: 07/27/24 1328     Procalcitonin 0.04 ng/mL     Narrative:      As a Marker for Sepsis (Non-Neonates):    1. <0.5 ng/mL represents a low risk of severe sepsis and/or septic shock.  2. >2 ng/mL represents a high risk of severe sepsis and/or septic shock.    As a Marker for Lower Respiratory Tract Infections that require antibiotic therapy:    PCT on Admission    Antibiotic Therapy       6-12 Hrs later    >0.5                Strongly Recommended  >0.25 - <0.5        Recommended " "  0.1 - 0.25          Discouraged              Remeasure/reassess PCT  <0.1                Strongly Discouraged     Remeasure/reassess PCT    As 28 day mortality risk marker: \"Change in Procalcitonin Result\" (>80% or <=80%) if Day 0 (or Day 1) and Day 4 values are available. Refer to [email or web address]-[email or web address]    Change in PCT <=80%  A decrease of PCT levels below or equal to 80% defines a positive change in PCT test result representing a higher risk for 28-day all-cause mortality of patients diagnosed with severe sepsis for septic shock.    Change in PCT >80%  A decrease of PCT levels of more than 80% defines a negative change in PCT result representing a lower risk for 28-day all-cause mortality of patients diagnosed with severe sepsis or septic shock.       Lactic Acid, Plasma [[id number]]  (Abnormal) Collected: 07/27/24 1230    Specimen: Blood Updated: 07/27/24 1326     Lactate 2.3 mmol/L     Comprehensive Metabolic Panel [[id number]]  (Abnormal) Collected: 07/27/24 1230    Specimen: Blood Updated: 07/27/24 1324     Glucose 243 mg/dL      BUN 23 mg/dL      Creatinine 1.34 mg/dL      Sodium 137 mmol/L      Potassium 4.4 mmol/L      Comment: Slight hemolysis detected by analyzer. Result may be falsely elevated.        Chloride 104 mmol/L      CO2 19.0 mmol/L      Calcium 9.5 mg/dL      Total Protein 7.3 g/dL      Albumin 4.2 g/dL      ALT (SGPT) 19 U/L      AST (SGOT) 15 U/L      Alkaline Phosphatase 86 U/L      Total Bilirubin 0.9 mg/dL      Globulin 3.1 gm/dL      A/G Ratio 1.4 g/dL      BUN/Creatinine Ratio 17.2     Anion Gap 14.0 mmol/L      eGFR 58.4 mL/min/1.73     Narrative:      GFR Normal >60  Chronic Kidney Disease <60  Kidney Failure <15      C-reactive Protein [[id number]]  (Normal) Collected: 07/27/24 1230    Specimen: Blood Updated: 07/27/24 1324     C-Reactive Protein 0.33 mg/dL     CBC & Differential [[id number]]  (Abnormal) Collected: 07/27/24 1230    Specimen: Blood Updated: 07/27/24 1306 "    Narrative:      The following orders were created for panel order CBC & Differential.  Procedure                               Abnormality         Status                     ---------                               -----------         ------                     CBC Auto Differential[[id number]]        Abnormal            Final result                 Please view results for these tests on the individual orders.    CBC Auto Differential [[id number]]  (Abnormal) Collected: 07/27/24 1230    Specimen: Blood Updated: 07/27/24 1306     WBC 5.19 10*3/mm3      RBC 4.88 10*6/mm3      Hemoglobin 14.6 g/dL      Hematocrit 43.9 %      MCV 90.0 fL      MCH 29.9 pg      MCHC 33.3 g/dL      RDW 13.4 %      RDW-SD 43.6 fl      MPV 11.5 fL      Platelets 111 10*3/mm3      Neutrophil % 55.6 %      Lymphocyte % 31.6 %      Monocyte % 8.5 %      Eosinophil % 3.3 %      Basophil % 0.6 %      Immature Grans % 0.4 %      Neutrophils, Absolute 2.89 10*3/mm3      Lymphocytes, Absolute 1.64 10*3/mm3      Monocytes, Absolute 0.44 10*3/mm3      Eosinophils, Absolute 0.17 10*3/mm3      Basophils, Absolute 0.03 10*3/mm3      Immature Grans, Absolute 0.02 10*3/mm3     Blood Culture - Blood, Arm, Left [[id number]] Collected: 07/27/24 1230    Specimen: Blood from Arm, Left Updated: 07/27/24 1303          Imaging Results (Last 24 Hours)       Procedure Component Value Units Date/Time    XR Foot 3+ View Right [[id number]] Collected: 07/27/24 1236     Updated: 07/27/24 1242    Narrative:      EXAMINATION: XR FOOT 3+ VW RIGHT- 7/27/2024 12:36 PM     HISTORY: Since surgery possible infection.     REPORT: 3 views of the right foot were obtained.     COMPARISON: Right foot x-rays 7/3/2024.     There is moderate swelling of the right great toe, this appears  increased, no soft tissue gas is identified. There is previous osteotomy  and arthrodesis at the interphalangeal joint of the right great toe with  a single compression screw which appears to be in  satisfactory position.  Compared with the recent x-rays there is slight widening of the  osteotomy space. No freddie osseous destruction is identified. Moderate  overgrowth of the first metatarsal head as before. The other joint  spaces are preserved. No acute fracture.       Impression:      1. Increased soft tissue swelling of the right great toe, correlate  clinically for soft tissue infection. No obvious bony destruction is  identified, postsurgical changes as described, with slight widening of  the osteotomy at the interphalangeal joint level. No significant bony  bridging associated with the arthrodesis at this time.     This report was signed and finalized on 7/27/2024 12:39 PM by Dr. Cyril Reyna MD.             I have personally reviewed and interpreted the radiology studies and ECG obtained at time of admission.     Assessment / Plan   Assessment:   Active Hospital Problems    Diagnosis     **Cellulitis of right toe        Treatment Plan  The patient will be admitted to my service here at [facility].     -Right big toe cellulitis versus diabetic foot rule out osteomyelitis of the big toe  Patient given history of prolonged/chronic wound to the plantar surface of the right big toe, and also a recent surgical manipulation on the third of this month.  Osteomyelitis is highly suspicious, therefore we will get an MRI to rule out osteomyelitis.  Continue patient on vancomycin and add cefepime.  Infectious disease/podiatry will be consulted as appropriate with the report of MRI.    -Diabetes mellitus  Will restart patient on Lantus and add sliding scale    -Paroxysmal atrial fibrillation  Restart patient's Eliquis and metoprolol    Other chronic medical conditions-  Psoriatic arthritis  History of skin cancer  Asthma  Bilateral carotid stenosis  Chronic diastolic congestive heart failure  Coronary artery disease  BPH  GERD  Hyperlipidemia  History of myocardial infarction  History of stroke  Seizure  disorder    DVT prophylaxis-Eliquis      Medical Decision Making  Number and Complexity of problems: High  Differential Diagnosis: Possible right big toe osteomyelitis    Conditions and Status        Condition is unchanged.     [facility] Data  External documents reviewed: No  Cardiac tracing (EKG, telemetry) interpretation: No  Radiology interpretation: Yes  Labs reviewed: Yes  Any tests that were considered but not ordered: None     Decision rules/scores evaluated (example [id number], Wells, etc): No     Discussed with: Patient     Care Planning  Shared decision making: Yes  Code status and discussions: Yes    Disposition  Social Determinants of Health that impact treatment or disposition: No  Estimated length of stay is 2 to 3 days.     I confirmed that the patient's advanced care plan is present, code status is documented, and a surrogate decision maker is listed in the patient's medical record.     The patient's surrogate decision maker is wife.     The patient was seen and examined by me on 7/27/2024 at 4:30 PM.    Electronically signed by Jerman Pemberton MD, 07/27/24, 17:42 CDT.               Electronically signed by Jerman Pemberton MD at 07/27/24 1759          Emergency Department Notes        Ana Fitzpatrick APRN at 07/27/24 1446       Attestation signed by Cruz Jett DO at 07/28/24 2013        SUPERVISE: For this patient encounter, I reviewed the APC's documentation, treatment plan, and medical decision making.  Cruz Jett DO 7/28/2024 20:13 CDT                         Subjective   History of Present Illness  Patient presents to the ER with right great toe infection after having surgery on ulcer on toe 7/3/24 by Dr. Villa.  Reports that he recently remove the dressing and has been trying to keep it clean he reported that the surgical site has healed but today he woke up with increased edema, pain and erythema to the site.  Patient denies any fever or chills.  Patient is a diabetic and  has had chronic foot wounds for over a year.        Review of Systems   Musculoskeletal:         Swelling and redness and pain on right great toe   All other systems reviewed and are negative.      Past Medical History:   Diagnosis Date    Arthritis     Asthma     Cancer     skin    Carotid disease, bilateral     Cellulitis     right foot - on antibiotics 6-    Chest pain     Chronic diastolic congestive heart failure 09/07/2019    Chronic sinusitis     Maribell bullosa     Coronary artery disease involving native coronary artery of native heart with unstable angina pectoris 01/17/2017    Deviated septum     Diabetes mellitus     Difficulty urinating     Diverticulitis     Enlarged prostate     Fatty liver     GERD (gastroesophageal reflux disease)     [place] (hard of hearing)     Does have hearing aids    Hyperlipidemia LDL goal <70 02/02/2017    Hypertrophy of nasal turbinates     Keratoderma     Kidney stone     Lung nodules     lower right lung    Migraine     Murmur, heart     Myocardial infarction     Obesity     Paroxysmal atrial fibrillation 07/11/2019    Personal history of COVID-19 07/2021    PONV (postoperative nausea and vomiting)     Primary hypertension 10/16/2016    Psoriasis     Seizures     Sinus congestion     Skin cancer     Sleep apnea     not using cpap    SOB (shortness of breath)     Stroke     mild weakness on right side    UTI (urinary tract infection)        Allergies   Allergen Reactions    Flagyl [Metronidazole] Hives    Atorvastatin Other (See Comments) and Myalgia      - LIPITOR -   Muscle cramps    Ciprofloxacin Hives      - CIPRO -        Past Surgical History:   Procedure Laterality Date    CARDIAC CATHETERIZATION  01/2016    Dr. Broadbent; widely patent previously placed stents in the left anterior descending and obstructive disease involving the diagonal branch which was treated medically    CARDIAC CATHETERIZATION N/A 07/14/2017    Procedure: Left Heart Cath;  Surgeon: Wade  MD Karoline;  Location:  [facility];  Service:     CARDIAC CATHETERIZATION Left 10/15/2018    Procedure: Cardiac Catheterization/Vascular Study;  Surgeon: Wade Ramey MD;  Location:  [facility];  Service: Cardiology    CARDIAC CATHETERIZATION  10/15/2018    Procedure: Functional Flow [place];  Surgeon: Wade Ramey MD;  Location:  [facility];  Service: Cardiology    CARDIAC CATHETERIZATION N/A 10/15/2018    Procedure: Left ventriculography;  Surgeon: Wade Ramey MD;  Location:  [facility];  Service: Cardiology    CARDIAC CATHETERIZATION Left 06/26/2019    Procedure: Cardiac Catheterization/Vascular Study VEL OK  HE WILL WAIT 1 YEAR FOR SHOULDER SURGERY ;  Surgeon: Wade Ramey MD;  Location:  [facility];  Service: Cardiology    CARDIAC CATHETERIZATION Left 04/30/2021    Procedure: Coronary angiography;  Surgeon: Sahil Llamas MD;  Location:  [facility];  Service: Cardiology;  Laterality: Left;    CARDIAC CATHETERIZATION N/A 04/30/2021    Procedure: Percutaneous Coronary Intervention;  Surgeon: Sahil Llamas MD;  Location:  [facility];  Service: Cardiology;  Laterality: N/A;    CARDIAC CATHETERIZATION N/A 11/09/2022    Procedure: Left Heart Cath with SVGs;  Surgeon: Wade Ramey MD;  Location:  [facility];  Service: Cardiology;  Laterality: N/A;    CARPAL TUNNEL RELEASE      January 2024 and pther hand February 2024    CHOLECYSTECTOMY      CHOLECYSTECTOMY WITH INTRAOPERATIVE CHOLANGIOGRAM N/A 08/01/2018    Procedure: CHOLECYSTECTOMY LAPAROSCOPIC INTRAOPERATIVE CHOLANGIOGRAM;  Surgeon: Shane Ann MD;  Location: [facility] OR;  Service: General    COLONOSCOPY N/A 07/14/2020    Procedure: COLONOSCOPY WITH ANESTHESIA;  Surgeon: Anupam Morales DO;  Location: [facility] ENDOSCOPY;  Service: Gastroenterology;  Laterality: N/A;  pre: abdominal pain  post:  diverticulosis  Vinyaak Correia PA    CORONARY ANGIOPLASTY      CORONARY ARTERY BYPASS GRAFT N/A 07/06/2019    Procedure: CABG X2 WITH LIMA, LEFT LEG OVH, AND PLACEMENT OF LEFT FEMORAL ARTERIAL LINE;  Surgeon: Steven Tang MD;  Location:  [facility] OR;  Service: Cardiothoracic    CORONARY STENT PLACEMENT      x 6    CYSTOSCOPY TRANSURETHRAL RESECTION OF PROSTATE N/A 01/23/2023    Procedure: CYSTOSCOPY TRANSURETHRAL RESECTION OF PROSTATE;  Surgeon: Latrell Pope MD;  Location:  [facility] OR;  Service: Urology;  Laterality: N/A;    ENDOSCOPIC FUNCTIONAL SINUS SURGERY (FESS) Bilateral 12/13/2017    Procedure: PROCEDURE PERFORMED:  Bilateral functional endoscopic anterior ethmoidectomy with bilateral middle meatal antrostomy Septoplasty Right kathia bullosa resection Bilateral inferior turbinate reduction via Coblation;  Surgeon: Mayank Ibarra MD;  Location:  [facility] OR;  Service:     ENDOSCOPY N/A 07/30/2018    Procedure: ESOPHAGOGASTRODUODENOSCOPY WITH ANESTHESIA;  Surgeon: Benitez Mas MD;  Location: [facility] ENDOSCOPY;  Service: Gastroenterology    ENDOSCOPY N/A 07/14/2020    Procedure: ESOPHAGOGASTRODUODENOSCOPY WITH ANESTHESIA;  Surgeon: Anupam Morales DO;  Location: [facility] ENDOSCOPY;  Service: Gastroenterology;  Laterality: N/A;  pre: abdominal pain  post: esophagitis  Vinayak Correia PA    HERNIA REPAIR      x2 inguinal area    KIDNEY STONE SURGERY      KNEE ARTHROSCOPY Right 03/01/2022    Procedure: RIGHT KNEE PARTIAL LATERAL MENISCECTOMY;  Surgeon: Pedro Pablo Song MD;  Location:  [facility] OR;  Service: Orthopedics;  Laterality: Right;    KNEE SURGERY Right     OTHER SURGICAL HISTORY      urolift    PROSTATE SURGERY      Dr. Badillo - 2017    PULMONARY ARTERY PRESSURE SENSOR IMPLANT N/A 05/08/2023    Procedure: PA Pressure Sensor Implant;  Surgeon: Wade Ramey MD;  Location:  [facility];  Service: Cardiology;  Laterality: N/A;    ROTATOR CUFF REPAIR Right     SLEEP  ENDOSCOPY N/A 2023    Procedure: Videosleep endoscopy;  Surgeon: Mayank Ibarra MD;  Location:  [facility] OR;  Service: ENT;  Laterality: N/A;    THUMB AMPUTATION Left     partial    TOE FUSION Right 7/3/2024    Procedure: Hallux Interphalangeal Joint Arthrodesis, Bone Graft [place] - Right Foot;  Surgeon: Hernan Villa DPM;  Location:  [facility] OR;  Service: Podiatry;  Laterality: Right;    TOE SURGERY      great toe       Family History   Problem Relation Age of Onset    Heart disease Father     COPD Mother     Hypertension Mother     Asthma Mother     No Known Problems Sister     Colon cancer Paternal Uncle     Prostate cancer Maternal Grandfather     No Known Problems Sister     Colon cancer Maternal Grandmother     Colon polyps Maternal Grandmother        Social History     Socioeconomic History    Marital status:    Tobacco Use    Smoking status: Former     Current packs/day: 0.00     Average packs/day: 1.5 packs/day for 18.0 years (27.0 ttl pk-yrs)     Types: Cigarettes, Cigars     Start date:      Quit date:      Years since quittin.5     Passive exposure: Past    Smokeless tobacco: Former     Types: Chew     Quit date:    Vaping Use    Vaping status: Never Used   Substance and Sexual Activity    Alcohol use: No     Comment: 0    Drug use: No    Sexual activity: Defer           Objective   Physical Exam  Vitals and nursing note reviewed.   Constitutional:       General: He is not in acute distress.     Appearance: Normal appearance. He is normal weight. He is not toxic-appearing or diaphoretic.   HENT:      Head: Normocephalic and atraumatic.      Right Ear: External ear normal.      Left Ear: External ear normal.      Nose: Nose normal.      Mouth/Throat:      Mouth: Mucous membranes are moist.   Eyes:      General:         Right eye: No discharge.         Left eye: No discharge.      Extraocular Movements: Extraocular movements intact.      Conjunctiva/sclera: Conjunctivae  normal.   Cardiovascular:      Rate and Rhythm: Normal rate.      Pulses: Normal pulses.      Heart sounds: Normal heart sounds.   Pulmonary:      Effort: Pulmonary effort is normal. No respiratory distress.      Breath sounds: Normal breath sounds. No rhonchi.      Comments: Dorsal pedal pulse strong  Abdominal:      General: Abdomen is flat.      Tenderness: There is no abdominal tenderness. There is no guarding or rebound.   Musculoskeletal:         General: No deformity or signs of injury.      Cervical back: Normal range of motion.   Skin:     General: Skin is warm.      Capillary Refill: Capillary refill takes 2 to 3 seconds.      Coloration: Skin is not jaundiced.      Comments: Right Toe has erythema and edema, tender to touch   Neurological:      General: No focal deficit present.      Mental Status: He is alert and oriented to person, place, and time. Mental status is at baseline.   Psychiatric:         Mood and Affect: Mood normal.         Behavior: Behavior normal.         Thought Content: Thought content normal.         Judgment: Judgment normal.         Procedures          ED Course                                             Medical Decision Making  History of Present Illness  Patient presents to the ER with right great toe infection after having surgery on ulcer on toe 7/3/24 by Dr. Villa.  Reports that he recently remove the dressing and has been trying to keep it clean he reported that the surgical site has healed but today he woke up with increased edema, pain and erythema to the site.  Patient denies any fever or chills.  Patient is a diabetic and has had chronic foot wounds for over a year.    Differential diagnosis: Osteomyelitis, fracture, sepsis, and others    Labs Reviewed  COMPREHENSIVE METABOLIC PANEL - Abnormal; Notable for the following components:     Glucose                       243 (*)                Creatinine                    1.34 (*)               CO2                            "19.0 (*)               eGFR                          58.4 (*)            All other components within normal limits         Narrative: GFR Normal >60                  Chronic Kidney Disease <60                  Kidney Failure <15                    LACTIC ACID, PLASMA - Abnormal; Notable for the following components:     Lactate                       2.3 (*)             All other components within normal limits  CBC WITH AUTO DIFFERENTIAL - Abnormal; Notable for the following components:     Platelets                     111 (*)             All other components within normal limits  PROCALCITONIN - Normal         Narrative: As a Marker for Sepsis (Non-Neonates):                                    1. <0.5 ng/mL represents a low risk of severe sepsis and/or septic shock.                  2. >2 ng/mL represents a high risk of severe sepsis and/or septic shock.                                    As a Marker for Lower Respiratory Tract Infections that require antibiotic therapy:                                    PCT on Admission    Antibiotic Therapy       6-12 Hrs later                                    >0.5                Strongly Recommended                  >0.25 - <0.5        Recommended                   0.1 - 0.25          Discouraged              Remeasure/reassess PCT                  <0.1                Strongly Discouraged     Remeasure/reassess PCT                                    As 28 day mortality risk marker: \"Change in Procalcitonin Result\" (>80% or <=80%) if Day 0 (or Day 1) and Day 4 values are available. Refer to http://www.[employer]-pct-calculator.com                                    Change in PCT <=80%                  A decrease of PCT levels below or equal to 80% defines a positive change in PCT test result representing a higher risk for 28-day all-cause mortality of patients diagnosed with severe sepsis for septic shock.                                    Change in PCT >80%                  A " decrease of PCT levels of more than 80% defines a negative change in PCT result representing a lower risk for 28-day all-cause mortality of patients diagnosed with severe sepsis or septic shock.                     C-REACTIVE PROTEIN - Normal  BLOOD CULTURE  BLOOD CULTURE  LACTIC ACID, REFLEX  CBC AND DIFFERENTIAL   XR Foot 3+ View Right   Final Result    1. Increased soft tissue swelling of the right great toe, correlate    clinically for soft tissue infection. No obvious bony destruction is    identified, postsurgical changes as described, with slight widening of    the osteotomy at the interphalangeal joint level. No significant bony    bridging associated with the arthrodesis at this time.         This report was signed and finalized on 7/27/2024 12:39 PM by Dr. Cyril Reyna MD.       Lactic slightly elevated, blood cell count was normal.  Patient's x-ray shows soft tissue infection.  Patient was given IV vancomycin while in the ER.  Patient is a diabetic.  Patient does suffer from chronic foot wounds.  Spoke with hospitalist who is agreeable to admission at this time.  This time patient's care will be transferred over to hospitalist for further evaluation of the treatment of the right great toe cellulitis.    Problems Addressed:  Cellulitis of great toe of right foot: complicated acute illness or injury    Amount and/or Complexity of Data Reviewed  Labs: ordered.  Radiology: ordered.    Risk  Prescription drug management.  Decision regarding hospitalization.        Final diagnoses:   Cellulitis of great toe of right foot       ED Disposition  ED Disposition       ED Disposition   Decision to Admit    Condition   --    Comment   Level of Care: Med/Surg [1]   Diagnosis: Cellulitis of right toe [[id number]]   Admitting Physician: SIDRA JOHN [[id number]]   Certification: I Certify That Inpatient Hospital Services Are Medically Necessary For Greater Than 2 Midnights                 No follow-up provider  specified.       Medication List      No changes were made to your prescriptions during this visit.            Ana FitzpatrickTEENA  07/27/24 1606      Electronically signed by Cruz Jett DO at 07/28/24 2013       Vital Signs (last 2 days)       Date/Time Temp Temp src Pulse Resp BP Patient Position SpO2    07/29/24 0736 97.9 (36.6) Oral 64 18 126/68 Lying 95    07/28/24 2014 97.4 (36.3) Oral 62 18 117/56 Lying 96    07/28/24 1458 97.8 (36.6) Oral 92 18 123/55 Sitting 95    07/28/24 1211 97.9 (36.6) Oral 58 18 115/70 Lying 98    07/28/24 0752 97.9 (36.6) Oral 59 16 130/60 Sitting 95    07/28/24 0431 98.1 (36.7) Oral 66 18 118/67 Lying 96    07/28/24 0018 98 (36.7) Oral 63 18 102/48 Lying 94    07/27/24 2005 98.1 (36.7) Oral 72 18 152/87 Lying 97    07/27/24 1551 97.6 (36.4) Oral 65 18 140/75 Sitting 98    07/27/24 15:30:38 97.4 (36.3) Oral 66 20 137/62 -- 100    07/27/24 1158 97.8 (36.6) -- 67 18 149/80 -- 98          Current Facility-Administered Medications   Medication Dose Route Frequency Provider Last Rate Last Admin    acetaminophen (TYLENOL) tablet 650 mg  650 mg Oral Q4H PRN Jerman Pemberton MD        Or    acetaminophen (TYLENOL) 160 MG/5ML oral solution 650 mg  650 mg Oral Q4H PRN Jerman Pemberton MD        Or    acetaminophen (TYLENOL) suppository 650 mg  650 mg Rectal Q4H PRN Jerman Pemberton MD        albuterol (PROVENTIL) nebulizer solution 0.083% 2.5 mg/3mL  2.5 mg Nebulization Q6H PRN Jerman Pemberton MD        apixaban (ELIQUIS) tablet 5 mg  5 mg Oral BID Jerman Pemberton MD   5 mg at 07/29/24 0801    aspirin chewable tablet 81 mg  81 mg Oral Daily Jerman Pemberton MD   81 mg at 07/29/24 0800    sennosides-docusate (PERICOLACE) 8.6-50 MG per tablet 2 tablet  2 tablet Oral BID PRN Jerman Pemberton MD        And    polyethylene glycol (MIRALAX) packet 17 g  17 g Oral Daily PRN Jerman Pemberton MD        And    bisacodyl (DULCOLAX) EC tablet 5 mg  5 mg Oral Daily PRN  Jerman Pemberton MD        And    bisacodyl (DULCOLAX) suppository 10 mg  10 mg Rectal Daily PRN Jerman Pemberton MD        Calcium Replacement - Follow Nurse / BPA Driven Protocol   Does not apply PRN Jerman Pemberton MD        cefepime 2000 mg IVPB in 100 mL NS (MBP)  2,000 mg Intravenous Q8H Jerman Pemberton MD   2,000 mg at 07/29/24 0222    dextrose (D50W) (25 g/50 mL) IV injection 25 g  25 g Intravenous Q15 Min PRN Jerman Pemberton MD        dextrose (GLUTOSE) oral gel 15 g  15 g Oral Q15 Min PRN Jerman Pemberton MD        docusate sodium (COLACE) capsule 100 mg  100 mg Oral Daily Jerman Pemberton MD   100 mg at 07/29/24 0800    empagliflozin (JARDIANCE) tablet 10 mg  10 mg Oral Daily Jerman Pemberton MD   10 mg at 07/29/24 0800    ezetimibe (ZETIA) tablet 10 mg  10 mg Oral Daily Jerman Pemberton MD   10 mg at 07/29/24 0801    fluticasone (FLONASE) 50 MCG/ACT nasal spray 2 spray  2 spray Nasal Daily Jerman Pemberton MD   2 spray at 07/29/24 0802    furosemide (LASIX) tablet 40 mg  40 mg Oral Daily PRN Jerman Pemberton MD        gabapentin (NEURONTIN) capsule 300 mg  300 mg Oral Nightly Jerman Pemberton MD   300 mg at 07/28/24 2054    glucagon (GLUCAGEN) injection 1 mg  1 mg Intramuscular Q15 Min PRN Jerman Pemberton MD        guaiFENesin (MUCINEX) 12 hr tablet 1,200 mg  1,200 mg Oral Daily PRN Jerman Pemberton MD        insulin glargine (LANTUS, SEMGLEE) injection 30 Units  30 Units Subcutaneous Q12H Jerman Pemberton MD   30 Units at 07/28/24 2107    Insulin Lispro (humaLOG) injection 3-14 Units  3-14 Units Subcutaneous 4x Daily AC & at Bedtime Jerman Pemberton MD   3 Units at 07/29/24 0801    isosorbide mononitrate (IMDUR) 24 hr tablet 120 mg  120 mg Oral Daily Jerman Pemberton MD   120 mg at 07/29/24 0800    lamoTRIgine (LaMICtal) tablet 200 mg  200 mg Oral BID Jerman Pemberton MD   200 mg at 07/29/24 0800    levETIRAcetam (KEPPRA) tablet 1,500 mg  1,500  mg Oral Daily Jerman Pemberton MD   1,500 mg at 07/29/24 0800    levETIRAcetam (KEPPRA) tablet 2,000 mg  2,000 mg Oral Nightly Jerman Pemberton MD   2,000 mg at 07/28/24 2054    Magnesium Low Dose Replacement - Follow Nurse / BPA Driven Protocol   Does not apply PRN Jerman Pemberton MD        [Held by provider] metFORMIN (GLUCOPHAGE) tablet 1,000 mg  1,000 mg Oral BID With Meals Jerman Pemberton MD   1,000 mg at 07/28/24 0838    metoprolol tartrate (LOPRESSOR) tablet 100 mg  100 mg Oral BID Jerman Pemberton MD   100 mg at 07/29/24 0800    nitroglycerin (NITROSTAT) SL tablet 0.4 mg  0.4 mg Sublingual Q5 Min PRN Jerman Pemberton MD        ondansetron (ZOFRAN) injection 4 mg  4 mg Intravenous Q6H PRN Jerman Pemberton MD        oxyCODONE-acetaminophen (PERCOCET) 5-325 MG per tablet 1 tablet  1 tablet Oral Q8H PRN Jerman Pemberton MD   1 tablet at 07/29/24 0234    pantoprazole (PROTONIX) EC tablet 40 mg  40 mg Oral Q AM Jerman Pemberton MD   40 mg at 07/29/24 0621    Phosphorus Replacement - Follow Nurse / BPA Driven Protocol   Does not apply PRN Jerman Pemberton MD        Potassium Replacement - Follow Nurse / BPA Driven Protocol   Does not apply PRN Jerman Pemberton MD        rosuvastatin (CRESTOR) tablet 20 mg  20 mg Oral Nightly Jerman Pemberton MD   20 mg at 07/28/24 2054    sacubitril-valsartan (ENTRESTO)  MG tablet 1 tablet  1 tablet Oral BID Jerman Pemberton MD   1 tablet at 07/29/24 0800    sodium chloride 0.9 % flush 10 mL  10 mL Intravenous Q12H Jerman Pemberton MD   10 mL at 07/29/24 0802    sodium chloride 0.9 % flush 10 mL  10 mL Intravenous PRN Jerman Pemberton MD        sodium chloride 0.9 % infusion 40 mL  40 mL Intravenous PRN Jerman Pemberton MD        spironolactone (ALDACTONE) tablet 50 mg  50 mg Oral Daily Jerman Pemberton MD   50 mg at 07/29/24 0800    tamsulosin (FLOMAX) 24 hr capsule 0.4 mg  0.4 mg Oral Daily Jerman Pemberton MD   0.4 mg  at 07/29/24 0800    vancomycin 1750 mg/500 mL 0.9% NS IVPB ([facility])  1,750 mg Intravenous Q12H Jerman Pemberton MD   1,750 mg at 07/29/24 0622        Physician Progress Notes (last 48 hours)        Meghan Salazar APRN at 07/28/24 0951       Attestation signed by Jerman Pemberton MD at 07/28/24 1918    I have reviewed this documentation and agree.                      [facility]  INPATIENT PROGRESS NOTE    Length of Stay: 1  Date of Admission: 7/27/2024  Primary Care Physician: Vinayak Correia PA    Subjective   Chief Complaint: Swelling, pain, erythema right great toe    HPI     Mr. Boyer presented to [facility] emergency room 7/27/2024 after awakening the morning of admission with pain, swelling, redness right great toe.  He has a history of ulcer on the plantar surface of right big toe for 5 years.  Dr. Villa recently took him to surgery on 7/3/2024 for hallux interphalangeal joint arthrodesis with bone graft.  Patient reported wound healing and he was seen at wound care on 7/25 for dressing change.  No drainage or erythema noted at that time. He has a history of psoriatic arthritis, skin cancer, asthma asthma, bilateral carotid stenosis, chronic diastolic heart failure, coronary artery disease, diabetes, paroxysmal atrial fibrillation, myocardial infarction, stroke, seizure disorder.  Patient reported awakening with swelling, redness, pain right great toe and he was concerned for bone infection.  Lactate 2.3, creatinine 1.34, WBC 5.19.  X-ray right foot showed increased soft tissue swelling right great toe correlate for soft tissue infection.  No obvious bony destruction identified.  Postsurgical changes with slight widening of the osteotomy interphalangeal joint level.  No significant bony bridging associated with arthrodesis.  Vancomycin, morphine, Zofran given in ER      Today  Lying in bed.  No oxygen in use.  Wife in room.  Right  foot elevated.  Right great toe with erythema, edematous.  No drainage noted at this time.  Continue vancomycin and cefepime.  Check MRI of the foot today.  Consult Dr. Villa but not available until 7/30.  Patient has nausea, vomiting or abdominal pain.  No complaints of chest pain, palpitations or shortness of breath.      Review of Systems   Constitutional:  Positive for activity change and fatigue.   HENT:  Negative for congestion and trouble swallowing.    Eyes:  Negative for photophobia and visual disturbance.   Respiratory:  Negative for cough, shortness of breath and wheezing.    Cardiovascular:  Negative for chest pain, palpitations and leg swelling.   Gastrointestinal:  Negative for constipation, diarrhea, nausea and vomiting.   Endocrine: Negative for cold intolerance, heat intolerance and polyuria.   Genitourinary:  Negative for dysuria, frequency and urgency.   Musculoskeletal:  Positive for gait problem.   Skin:  Positive for color change (Right great toe.) and wound (Right great toe).   Allergic/Immunologic: Negative for immunocompromised state.   Neurological:  Positive for weakness. Negative for light-headedness.   Hematological:  Negative for adenopathy. Does not bruise/bleed easily.   Psychiatric/Behavioral:  Negative for agitation, behavioral problems and confusion.         All pertinent negatives and positives are as above. All other systems have been reviewed and are negative unless otherwise stated.     Objective    Temp:  [97.4 °F (36.3 °C)-98.1 °F (36.7 °C)] 97.9 °F (36.6 °C)  Heart Rate:  [59-72] 59  Resp:  [16-20] 16  BP: (102-152)/(48-87) 130/60    Physical Exam  Vitals and nursing note reviewed.   Constitutional:       Appearance: He is obese.      Comments: Sitting up in bed.  No oxygen use.  Wife in room.   HENT:      Head: Normocephalic and atraumatic.      Nose: No congestion.      Mouth/Throat:      Pharynx: Oropharynx is clear. No oropharyngeal exudate or posterior oropharyngeal  erythema.   Eyes:      Extraocular Movements: Extraocular movements intact.      Pupils: Pupils are equal, round, and reactive to light.   Cardiovascular:      Rate and Rhythm: Normal rate and regular rhythm.      Heart sounds: No murmur heard.  Pulmonary:      Breath sounds: No wheezing, rhonchi or rales.      Comments: No oxygen in use.  Abdominal:      Palpations: Abdomen is soft.      Tenderness: There is no abdominal tenderness.   Genitourinary:     Comments: Voiding.  Musculoskeletal:         General: Swelling (Right great toe) and tenderness (Right great toe) present.      Cervical back: Normal range of motion and neck supple.   Skin:     Findings: Erythema (Right great toe) present.   Neurological:      General: No focal deficit present.      Mental Status: He is alert and oriented to person, place, and time.   Psychiatric:         Mood and Affect: Mood normal.         Behavior: Behavior normal.         Thought Content: Thought content normal.         Judgment: Judgment normal.           Results Review:  I have reviewed the labs, radiology results, and diagnostic studies.    Laboratory Data:      Results from last 7 days   Lab Units 07/27/24  1230   WBC 10*3/mm3 5.19   HEMOGLOBIN g/dL 14.6   HEMATOCRIT % 43.9   PLATELETS 10*3/mm3 111*        Results from last 7 days   Lab Units 07/28/24  0520 07/27/24  1230   SODIUM mmol/L 138 137   POTASSIUM mmol/L 4.5 4.4   CHLORIDE mmol/L 105 104   CO2 mmol/L 20.0* 19.0*   BUN mg/dL 21 23   CREATININE mg/dL 1.09 1.34*   GLUCOSE mg/dL 184* 243*   CALCIUM mg/dL 9.3 9.5   ALT (SGPT) U/L  --  19         Culture Data:      Microbiology Results (last 10 days)       Procedure Component Value - Date/Time    MRSA Screen, PCR (Inpatient) - Swab, Nares [[id number]]  (Normal) Collected: 07/27/24 2055    Lab Status: Final result Specimen: Swab from Nares Updated: 07/27/24 2211     MRSA PCR No MRSA Detected    Narrative:      The negative predictive value of this diagnostic test is high  and should only be used to consider de-escalating anti-MRSA therapy. A positive result may indicate colonization with MRSA and must be correlated clinically.              Radiology Data:   Imaging Results (Last 72 Hours)       Procedure Component Value Units Date/Time    XR Foot 3+ View Right [[id number]] Collected: 07/27/24 1236     Updated: 07/27/24 1242    Narrative:      EXAMINATION: XR FOOT 3+ VW RIGHT- 7/27/2024 12:36 PM     HISTORY: Since surgery possible infection.     REPORT: 3 views of the right foot were obtained.     COMPARISON: Right foot x-rays 7/3/2024.     There is moderate swelling of the right great toe, this appears  increased, no soft tissue gas is identified. There is previous osteotomy  and arthrodesis at the interphalangeal joint of the right great toe with  a single compression screw which appears to be in satisfactory position.  Compared with the recent x-rays there is slight widening of the  osteotomy space. No freddie osseous destruction is identified. Moderate  overgrowth of the first metatarsal head as before. The other joint  spaces are preserved. No acute fracture.       Impression:      1. Increased soft tissue swelling of the right great toe, correlate  clinically for soft tissue infection. No obvious bony destruction is  identified, postsurgical changes as described, with slight widening of  the osteotomy at the interphalangeal joint level. No significant bony  bridging associated with the arthrodesis at this time.     This report was signed and finalized on 7/27/2024 12:39 PM by Dr. Cyril Reyna MD.               Intake/Output    Intake/Output Summary (Last 24 hours) at 7/28/2024 1018  Last data filed at 7/28/2024 0431  Gross per 24 hour   Intake --   Output 1000 ml   Net -1000 ml       Scheduled Meds  apixaban, 5 mg, Oral, BID  aspirin, 81 mg, Oral, Daily  cefepime, 2,000 mg, Intravenous, Q8H  docusate sodium, 100 mg, Oral, Daily  empagliflozin, 10 mg, Oral, Daily  fluticasone, 2  spray, Nasal, Daily  gabapentin, 300 mg, Oral, Nightly  insulin glargine, 30 Units, Subcutaneous, Q12H  insulin lispro, 3-14 Units, Subcutaneous, 4x Daily AC & at Bedtime  isosorbide mononitrate, 120 mg, Oral, Daily  lamoTRIgine, 200 mg, Oral, BID  levETIRAcetam, 1,500 mg, Oral, Daily  levETIRAcetam, 2,000 mg, Oral, Nightly  [Held by provider] metFORMIN, 1,000 mg, Oral, BID With Meals  metoprolol tartrate, 100 mg, Oral, BID  NON FORMULARY, 75 mg, Oral, TID  pantoprazole, 40 mg, Oral, Q AM  PATIENT SUPPLIED MEDICATION, 10 mg, Oral, Daily  rosuvastatin, 20 mg, Oral, Nightly  sacubitril-valsartan, 1 tablet, Oral, BID  sodium chloride, 10 mL, Intravenous, Q12H  spironolactone, 50 mg, Oral, Daily  tamsulosin, 0.4 mg, Oral, Daily  vancomycin, 1,750 mg, Intravenous, Q24H        I have reviewed the patient current medications.     Assessment/Plan     Active Hospital Problems    Diagnosis     **Cellulitis of right toe     Chronic anticoagulation     Chronic diastolic congestive heart failure with preserved ejection fraction     Primary hypertension     Type 2 diabetes mellitus with circulatory disorder, with long-term current use of insulin        Treatment Plan:    1.  Cellulitis right great toe.  Awoke 7/27 with pain, swelling, erythema right great toe.  Status post right hallux interphalangeal joint arthrodesis with bone graft 7/3 Dr. Villa.  Patient followed in wound care clinic on 7/25 without drainage or erythema.  X-ray right foot shows soft tissue swelling right great toe.  Continue vancomycin and cefepime.  Check MRI right foot.  Follow-up blood cultures.  Check CBC in AM.    2.  Diabetes mellitus type 2 with long-term use of insulin.  A1c 7 on 7/28/2024.  Accu-Cheks with sliding scale insulin coverage.  Glucoses 179, 184, 175. Glarigne 30 units twice daily.  Takes glargine twice daily and Humalog 3 times daily at home.  Continue Jardiance.  Hold metformin.    3.  Primary hypertension.  Blood pressure 130/60.   Continue metoprolol    4.  Chronic diastolic heart failure with preserved ejection fraction.  EF 61-65% per echo 4/18/2023.  No exacerbation of symptoms.  Continue aspirin, statin, beta-blocker, Entresto, spironolactone, Jardiance.    5.  History of coronary artery disease status post stents..  No complaints of chest pain.  Continue aspirin, Eliquis, statin, beta-blocker, Entresto, isosorbide    6.  Chronic anticoagulation previous history of atrial flutter.  Maintaining normal sinus rhythm.    7.  Eliquis serves as DVT prophylaxis.    Medical Decision Making  Number and Complexity of problems: 6  Cellulitis right great toe: Acute, high complexity poses threat to life and bodily function  Diabetes mellitus type II with insulin: Chronic, high complexity, not at baseline  Primary hypertension: Chronic, moderate complexity, stable  Chronic diastolic heart failure with preserved ejection fraction: Chronic, moderate complexity, stable  History of coronary artery disease status post stents: Chronic, moderate complexity, stable  Chronic anticoagulation: Chronic, moderate complexity, stable    Differential Diagnosis: Possible osteomyelitis right great toe    Conditions and Status        Condition is worsening.     [facility] Data  External documents reviewed: Epic.  Reviewed wound care notes, surgical procedure note 7/2/2024  Cardiac tracing (EKG, telemetry) interpretation: Personally reviewed EKG from 7/2/2024 showing normal sinus rhythm first-degree, heart rate 67 bpm, AR interval 246 ms.  Radiology interpretation: Radiology interpretation right foot x-ray  Labs reviewed:   BMP 7/28/2024.  Repeat BMP in AM.  CBC 7/28/2024.  Repeat CBC in AM.  A1c, glucose 175, 179, 170  CRP, lactate, procalcitonin    Any tests that were considered but not ordered: None     Decision rules/scores evaluated (example [id number], Wells, etc): None     Discussed with: Dr. Pemberton, patient and wife     Care Planning  Shared decision making:   [place], patient and wife.  Patient agrees to IV antibiotics, check MRI right foot, Accu-Cheks with sliding scale insulin coverage.  Code status and discussions: Full code  Patient surrogate decision maker is his wife, Daniela    Disposition  Social Determinants of Health that impact treatment or disposition: None  I expect the patient to be discharged to home in 2-3 days.     Electronically signed by TEENA Rowe, 07/28/24, 10:18 CDT.    The above documentation resulted from a face-to-face encounter by me Meghan DICKINSON, [facility].        Electronically signed by Jerman Pemberton MD at 07/28/24 5480

## 2024-07-29 NOTE — CASE MANAGEMENT/SOCIAL WORK
Discharge Planning Assessment  [facility]     Patient Name: Carlos A Boyer Jr.  MRN: [id number]  Today's Date: 7/29/2024    Admit Date: 7/27/2024        Discharge Needs Assessment       Row Name 07/29/24 1030       Living Environment    People in Home spouse    Name(s) of People in Home Daniela-spouse    Current Living Arrangements home    Primary Care Provided by self    Provides Primary Care For no one    Family Caregiver if Needed spouse    Quality of Family Relationships helpful;involved;supportive    Able to Return to Prior Arrangements yes       Transition Planning    Patient/Family Anticipates Transition to home with family    Patient/Family Anticipated Services at Transition none    Transportation Anticipated family or friend will provide       Discharge Needs Assessment    Readmission Within the Last 30 Days no previous admission in last 30 days    Equipment Currently Used at Home none    Concerns to be Addressed no discharge needs identified;denies needs/concerns at this time    Anticipated Changes Related to Illness none    Equipment Needed After Discharge none    Discharge Coordination/Progress Spoke with patient to complete DC planning. Pt plans to return home with spouse at DC. Has a PCP and Rx coverage. Denies the need for HH or any new DME at this time. Will follow for DC needs.                   Discharge Plan    No documentation.                 Continued Care and Services - Admitted Since 7/27/2024    No active coordination exists for this encounter.          Demographic Summary    No documentation.                  Functional Status    No documentation.                  Psychosocial    No documentation.                  Abuse/Neglect    No documentation.                  Legal    No documentation.                  Substance Abuse    No documentation.                  Patient Forms    No documentation.                     Yesica Harvey RN

## 2024-07-29 NOTE — PROGRESS NOTES
[facility]  INPATIENT PROGRESS NOTE    Length of Stay: 2  Date of Admission: 7/27/2024  Primary Care Physician: Vinayak Correia PA    Subjective   Chief Complaint: Swelling, pain, erythema right great toe    HPI     Mr. Boyer presented to [facility] emergency room 7/27/2024 after awakening the morning of admission with pain, swelling, redness right great toe.  He has a history of ulcer on the plantar surface of right big toe for 5 years.  Dr. Villa recently took him to surgery on 7/3/2024 for hallux interphalangeal joint arthrodesis with bone graft.  Patient reported wound healing and he was seen at wound care on 7/25 for dressing change.  No drainage or erythema noted at that time. He has a history of psoriatic arthritis, skin cancer, asthma asthma, bilateral carotid stenosis, chronic diastolic heart failure, coronary artery disease, diabetes, paroxysmal atrial fibrillation, myocardial infarction, stroke, seizure disorder.  Patient reported awakening with swelling, redness, pain right great toe and he was concerned for bone infection.  Lactate 2.3, creatinine 1.34, WBC 5.19.  X-ray right foot showed increased soft tissue swelling right great toe correlate for soft tissue infection.  No obvious bony destruction identified.  Postsurgical changes with slight widening of the osteotomy interphalangeal joint level.  No significant bony bridging associated with arthrodesis.  Vancomycin, morphine, Zofran given in ER      Today  Lying in bed.  No oxygen in use.  Wife in room.  Right foot elevated.  Right great toe with erythema, edematous.  No drainage noted at this time.  MRI right foot with diffuse edema throughout proximal and distal phalanx great toe edema could be postoperative or due to osteomyelitis.  Awaiting Dr. Villa to return from out of town.  Not available until tomorrow.  Will continue cefepime and vancomycin.    Review of Systems    Constitutional:  Positive for activity change and fatigue.   HENT:  Negative for congestion and trouble swallowing.    Eyes:  Negative for photophobia and visual disturbance.   Respiratory:  Negative for cough, shortness of breath and wheezing.    Cardiovascular:  Negative for chest pain, palpitations and leg swelling.   Gastrointestinal:  Negative for constipation, diarrhea, nausea and vomiting.   Endocrine: Negative for cold intolerance, heat intolerance and polyuria.   Genitourinary:  Negative for dysuria, frequency and urgency.   Musculoskeletal:  Positive for gait problem.   Skin:  Positive for color change (Right great toe.) and wound (Right great toe).   Allergic/Immunologic: Negative for immunocompromised state.   Neurological:  Positive for weakness. Negative for light-headedness.   Hematological:  Negative for adenopathy. Does not bruise/bleed easily.   Psychiatric/Behavioral:  Negative for agitation, behavioral problems and confusion.         All pertinent negatives and positives are as above. All other systems have been reviewed and are negative unless otherwise stated.     Objective    Temp:  [97.4 °F (36.3 °C)-97.9 °F (36.6 °C)] 97.8 °F (36.6 °C)  Heart Rate:  [61-92] 61  Resp:  [18] 18  BP: (108-126)/(55-68) 108/62    Physical Exam  Vitals and nursing note reviewed.   Constitutional:       Appearance: He is obese.      Comments: Sitting up in bed.  No oxygen use.  Wife in room.   HENT:      Head: Normocephalic and atraumatic.      Nose: No congestion.      Mouth/Throat:      Pharynx: Oropharynx is clear. No oropharyngeal exudate or posterior oropharyngeal erythema.   Eyes:      Extraocular Movements: Extraocular movements intact.      Pupils: Pupils are equal, round, and reactive to light.   Cardiovascular:      Rate and Rhythm: Normal rate and regular rhythm.      Heart sounds: No murmur heard.  Pulmonary:      Breath sounds: No wheezing, rhonchi or rales.      Comments: No oxygen in  use.  Abdominal:      Palpations: Abdomen is soft.      Tenderness: There is no abdominal tenderness.   Genitourinary:     Comments: Voiding.  Musculoskeletal:         General: Swelling (Right great toe) and tenderness (Right great toe) present.      Cervical back: Normal range of motion and neck supple.   Skin:     Findings: Erythema (Right great toe) present.   Neurological:      General: No focal deficit present.      Mental Status: He is alert and oriented to person, place, and time.   Psychiatric:         Mood and Affect: Mood normal.         Behavior: Behavior normal.         Thought Content: Thought content normal.         Judgment: Judgment normal.           Results Review:  I have reviewed the labs, radiology results, and diagnostic studies.    Laboratory Data:      Results from last 7 days   Lab Units 07/29/24  0433 07/27/24  1230   WBC 10*3/mm3 5.17 5.19   HEMOGLOBIN g/dL 13.0 14.6   HEMATOCRIT % 39.6 43.9   PLATELETS 10*3/mm3 93* 111*        Results from last 7 days   Lab Units 07/29/24  0433 07/28/24  0520 07/27/24  1230   SODIUM mmol/L 136 138 137   POTASSIUM mmol/L 4.5 4.5 4.4   CHLORIDE mmol/L 103 105 104   CO2 mmol/L 22.0 20.0* 19.0*   BUN mg/dL 21 21 23   CREATININE mg/dL 1.07 1.09 1.34*   GLUCOSE mg/dL 225* 184* 243*   CALCIUM mg/dL 9.3 9.3 9.5   ALT (SGPT) U/L  --   --  19         Culture Data:      Microbiology Results (last 10 days)       Procedure Component Value - Date/Time    MRSA Screen, PCR (Inpatient) - Swab, Nares [[id number]]  (Normal) Collected: 07/27/24 2055    Lab Status: Final result Specimen: Swab from Nares Updated: 07/27/24 2211     MRSA PCR No MRSA Detected    Narrative:      The negative predictive value of this diagnostic test is high and should only be used to consider de-escalating anti-MRSA therapy. A positive result may indicate colonization with MRSA and must be correlated clinically.    Blood Culture - Blood, Hand, Right [[id number]]  (Normal) Collected: 07/27/24 1832     Lab Status: Preliminary result Specimen: Blood from Hand, Right Updated: 07/28/24 1900     Blood Culture No growth at 24 hours    Blood Culture - Blood, Arm, Left [[id number]]  (Normal) Collected: 07/27/24 1339    Lab Status: Preliminary result Specimen: Blood from Arm, Left Updated: 07/29/24 1400     Blood Culture No growth at 2 days    Blood Culture - Blood, Arm, Left [[id number]]  (Normal) Collected: 07/27/24 1230    Lab Status: Preliminary result Specimen: Blood from Arm, Left Updated: 07/29/24 1315     Blood Culture No growth at 2 days              Radiology Data:   Imaging Results (Last 72 Hours)       Procedure Component Value Units Date/Time    MRI Foot Right Without Contrast [[id number]] Collected: 07/28/24 1546     Updated: 07/28/24 1601    Narrative:      EXAMINATION:  MRI FOOT RIGHT WO CONTRAST-  7/28/2024 10:00 AM     HISTORY: Evaluate for osteomyelitis. L03.031-Cellulitis of right toe.  The patient had fusion of the great toe on 7/3/2024.     COMPARISON: X-ray on 7/27/2024.     TECHNIQUE: Multiplanar and multisequence imaging was performed in a  high-field magnet without contrast.     FINDINGS: The patient has a corticated screw within the great toe. There  is T1 low signal and T2 high signal in the bone marrow of the great toe.  There has been osteotomy at the interphalangeal joint. There is fatty  atrophy of intrinsic foot muscles. There is diffuse subcutaneous edema  throughout the foot and more severe involving the great toe.          Impression:      1. There is diffuse edema throughout the proximal and distal phalanx of  the great toe. The patient had fairly recent surgery on 7/3/2024. This  edema could be postoperative or due to osteomyelitis. There is some  susceptibility artifact due to the hardware within the toe. This makes  evaluation of cortex somewhat difficult. Pre and postcontrast imaging  might be more helpful to further evaluate for osteomyelitis.  2. There is diffuse edema throughout  the subcutaneous fat of the  visualized foot, more severe in the great toe.  3. There is severe fatty atrophy of visualized intrinsic foot muscles  likely related to denervation.     This report was signed and finalized on 7/28/2024 3:58 PM by Dr. Wojciech Davidson MD.       XR Foot 3+ View Right [[id number]] Collected: 07/27/24 1236     Updated: 07/27/24 1242    Narrative:      EXAMINATION: XR FOOT 3+ VW RIGHT- 7/27/2024 12:36 PM     HISTORY: Since surgery possible infection.     REPORT: 3 views of the right foot were obtained.     COMPARISON: Right foot x-rays 7/3/2024.     There is moderate swelling of the right great toe, this appears  increased, no soft tissue gas is identified. There is previous osteotomy  and arthrodesis at the interphalangeal joint of the right great toe with  a single compression screw which appears to be in satisfactory position.  Compared with the recent x-rays there is slight widening of the  osteotomy space. No freddie osseous destruction is identified. Moderate  overgrowth of the first metatarsal head as before. The other joint  spaces are preserved. No acute fracture.       Impression:      1. Increased soft tissue swelling of the right great toe, correlate  clinically for soft tissue infection. No obvious bony destruction is  identified, postsurgical changes as described, with slight widening of  the osteotomy at the interphalangeal joint level. No significant bony  bridging associated with the arthrodesis at this time.     This report was signed and finalized on 7/27/2024 12:39 PM by Dr. Cyril Reyna MD.               Intake/Output    Intake/Output Summary (Last 24 hours) at 7/29/2024 1401  Last data filed at 7/29/2024 1121  Gross per 24 hour   Intake 240 ml   Output 1800 ml   Net -1560 ml       Scheduled Meds  apixaban, 5 mg, Oral, BID  aspirin, 81 mg, Oral, Daily  cefepime, 2,000 mg, Intravenous, Q8H  docusate sodium, 100 mg, Oral, Daily  empagliflozin, 10 mg, Oral,  Daily  ezetimibe, 10 mg, Oral, Daily  fluticasone, 2 spray, Nasal, Daily  gabapentin, 300 mg, Oral, Nightly  insulin glargine, 40 Units, Subcutaneous, Q12H  insulin lispro, 3-14 Units, Subcutaneous, 4x Daily AC & at Bedtime  isosorbide mononitrate, 120 mg, Oral, Daily  lamoTRIgine, 200 mg, Oral, BID  levETIRAcetam, 1,500 mg, Oral, Daily  levETIRAcetam, 2,000 mg, Oral, Nightly  [Held by provider] metFORMIN, 1,000 mg, Oral, BID With Meals  metoprolol tartrate, 100 mg, Oral, BID  pantoprazole, 40 mg, Oral, Q AM  rosuvastatin, 20 mg, Oral, Nightly  sacubitril-valsartan, 1 tablet, Oral, BID  sodium chloride, 10 mL, Intravenous, Q12H  spironolactone, 50 mg, Oral, Daily  tamsulosin, 0.4 mg, Oral, Daily  vancomycin, 1,750 mg, Intravenous, Q12H        I have reviewed the patient current medications.     Assessment/Plan     Active Hospital Problems    Diagnosis     **Cellulitis of right toe     Chronic anticoagulation     Chronic diastolic congestive heart failure with preserved ejection fraction     Primary hypertension     Type 2 diabetes mellitus with circulatory disorder, with long-term current use of insulin        Treatment Plan:    1.  Cellulitis right great toe.  Awoke 7/27 with pain, swelling, erythema right great toe.  Status post right hallux interphalangeal joint arthrodesis with bone graft 7/3 Dr. Villa.  Patient followed in wound care clinic on 7/25 without drainage or erythema.  X-ray right foot shows soft tissue swelling right great toe.  MRI right foot 7/28 notes diffuse edema throughout proximal and distal phalanx of great toe.  Recent surgery 7/3.  Edema could be postop or due to osteomyelitis.  Continue vancomycin and cefepime.  Remains afebrile.  WBC normal.  Blood cultures no growth.  Dr. Villa consulted and not available until 7/30.    2.  Diabetes mellitus type 2 with long-term use of insulin.  A1c 7 on 7/28/2024.  Accu-Cheks with sliding scale insulin coverage.  Glucoses 186, 183, 225.  Increase  glargine to 40 units twice daily.  Continue Jardiance.  Hold metformin.  Takes glargine twice daily and Humalog 3 times daily at home.     3.  Primary hypertension.  Blood pressure 108/62, 126/68.  Metoprolol continued.      4.  Chronic diastolic heart failure with preserved ejection fraction.  EF 61-65% per echo 4/18/2023.  No exacerbation of symptoms.  Continue aspirin, statin, beta-blocker, Entresto, spironolactone, Jardiance.    5.  History of coronary artery disease status post stents..  No complaints of chest pain.  Continue aspirin, Eliquis, statin, beta-blocker, Entresto, isosorbide    6.  Chronic anticoagulation previous history of atrial flutter.  Maintaining normal sinus rhythm.  Continue Eliquis.    7.  Eliquis serves as DVT prophylaxis.    Medical Decision Making  Number and Complexity of problems: 6  Cellulitis right great toe: Acute, high complexity poses threat to life and bodily function  Diabetes mellitus type II with insulin: Chronic, high complexity, not at baseline  Primary hypertension: Chronic, moderate complexity, stable  Chronic diastolic heart failure with preserved ejection fraction: Chronic, moderate complexity, stable  History of coronary artery disease status post stents: Chronic, moderate complexity, stable  Chronic anticoagulation: Chronic, moderate complexity, stable    Differential Diagnosis: Possible osteomyelitis right great toe    Conditions and Status        Condition is unchanged     MDM Data  External documents reviewed: Epic.  Reviewed wound care notes, surgical procedure note 7/2/2024  Cardiac tracing (EKG, telemetry) interpretation: Personally reviewed EKG from 7/2/2024 showing normal sinus rhythm first-degree, heart rate 67 bpm, OH interval 246 ms.  Radiology interpretation: Radiology interpretation right foot x-ray  Labs reviewed:   BMP 7/29/2024.  Repeat BMP in AM.  CBC 7/29/2024.  Repeat CBC in AM.  Glucose 186, 183, 225  CRP, lactate, procalcitonin    Any tests that  were considered but not ordered: None     Decision rules/scores evaluated (example [id number], Wells, etc): None     Discussed with: Dr. Montana, patient and wife     Care Planning  Shared decision making: Dr. Montana, patient and wife.  Patient agrees to IV antibiotics, MRI right foot, Accu-Cheks with sliding scale insulin coverage, Dr. Villa consult when available  Code status and discussions: Full code  Patient surrogate decision maker is his wife, Daniela    Disposition  Social Determinants of Health that impact treatment or disposition: None  I expect the patient to be discharged to home in 2-3 days.     Electronically signed by TEENA Rowe, 07/29/24, 14:01 CDT.    The above documentation resulted from a face-to-face encounter by me Meghan DICKINSON, [facility].

## 2024-07-29 NOTE — PLAN OF CARE
Goal Outcome Evaluation:  Plan of Care Reviewed With: patient, spouse        Progress: no change  Outcome Evaluation: Patient A/O x 4. VSS. On room air. Tolerating IV ABT. PRN pain med given once with good results. Wife at bedside throughout the night. Safety maintained. No new concerns. Safety maintained.

## 2024-07-29 NOTE — PLAN OF CARE
Goal Outcome Evaluation:  Plan of Care Reviewed With: patient        Progress: no change     Pt alert and oriented x4. VSS. Pt c/o pain, prn medications given with some relief of symptoms. ARRIAGA. PPP. Eliquis for VTE. , satting at 98% on room air. Tolerating diabetic diet. Skin intact. Voiding via urinal/bathroom. Up standby assist.  Last BM 7/29. BS monitored. Call light within reach. Safety maintained.plan of care ongoing. No changes this shift.

## 2024-07-29 NOTE — PROGRESS NOTES
"Pharmacy Dosing Service  Pharmacokinetics  Vancomycin Follow-up Evaluation    Assessment/Action/Plan:  Current Order: Vancomycin 1,750 mg IVPB every 12 hours (adjusted from 1,250 mg every 12 hours)  Current end date/final dose: 8/2/24 at 1800  Additional antimicrobial agent(s): Cefepime    Vancomycin trough = 8.2 (~11 hours post dose). Increased dose from 1,250 mg every 12 hours to 1,750 mg every 12 hours. Pharmacy will continue to follow daily and adjust dose accordingly.     Subjective:  Carlos A Boyer Jr. is a 64 y.o. male currently on Vancomycin 1,750 mg IV every 12 hours for the treatment of Bone/Joint Infection (Right toe cellulitis w/ possible osteomyelitis), day 3 of treatment.    AUC Model Data:  Regimen: 1750 mg IV every 12 hours.  Start time: 05:30 on 07/29/2024  Exposure target: AUC24 (range)400-600 mg/L.hr   AUC24,ss: 551 mg/L.hr  PAUC*: 98 %  Ctrough,ss: 15.8 mg/L  Pconc*: 19 %  Tox.: 11 %    Objective:  Ht: 181 cm (71.26\"); Wt: 118 kg (260 lb 2.3 oz)  Estimated Creatinine Clearance: 89 mL/min (by C-G formula based on SCr of 1.07 mg/dL).   Creatinine   Date Value Ref Range Status   07/29/2024 1.07 0.76 - 1.27 mg/dL Final   07/28/2024 1.09 0.76 - 1.27 mg/dL Final   07/27/2024 1.34 (H) 0.76 - 1.27 mg/dL Final      Lab Results   Component Value Date    WBC 5.17 07/29/2024    WBC 5.19 07/27/2024         Lab Results   Component Value Date    VANCOTROUGH 8.20 07/29/2024       Culture Results:  Microbiology Results (last 10 days)       Procedure Component Value - Date/Time    MRSA Screen, PCR (Inpatient) - Swab, Nares [[id number]]  (Normal) Collected: 07/27/24 2055    Lab Status: Final result Specimen: Swab from Nares Updated: 07/27/24 2211     MRSA PCR No MRSA Detected    Narrative:      The negative predictive value of this diagnostic test is high and should only be used to consider de-escalating anti-MRSA therapy. A positive result may indicate colonization with MRSA and must be correlated clinically.    " Blood Culture - Blood, Hand, Right [[id number]]  (Normal) Collected: 07/27/24 1832    Lab Status: Preliminary result Specimen: Blood from Hand, Right Updated: 07/28/24 1900     Blood Culture No growth at 24 hours    Blood Culture - Blood, Arm, Left [[id number]]  (Normal) Collected: 07/27/24 1339    Lab Status: Preliminary result Specimen: Blood from Arm, Left Updated: 07/28/24 1401     Blood Culture No growth at 24 hours    Blood Culture - Blood, Arm, Left [[id number]]  (Normal) Collected: 07/27/24 1230    Lab Status: Preliminary result Specimen: Blood from Arm, Left Updated: 07/28/24 1315     Blood Culture No growth at 24 hours            Duong Klein, PharmD   07/29/24 05:15 CDT

## 2024-07-30 ENCOUNTER — READMISSION MANAGEMENT (OUTPATIENT)
Dept: CALL CENTER | Facility: HOSPITAL | Age: 64
End: 2024-07-30
Payer: OTHER GOVERNMENT

## 2024-07-30 VITALS
SYSTOLIC BLOOD PRESSURE: 112 MMHG | TEMPERATURE: 97.7 F | HEART RATE: 63 BPM | DIASTOLIC BLOOD PRESSURE: 58 MMHG | OXYGEN SATURATION: 96 % | HEIGHT: 71 IN | BODY MASS INDEX: 36.42 KG/M2 | WEIGHT: 260.14 LBS | RESPIRATION RATE: 18 BRPM

## 2024-07-30 PROBLEM — I50.32 CHRONIC HEART FAILURE WITH PRESERVED EJECTION FRACTION (HFPEF): Status: ACTIVE | Noted: 2024-07-30

## 2024-07-30 LAB
ANION GAP SERPL CALCULATED.3IONS-SCNC: 13 MMOL/L (ref 5–15)
BUN SERPL-MCNC: 18 MG/DL (ref 8–23)
BUN/CREAT SERPL: 19.4 (ref 7–25)
CALCIUM SPEC-SCNC: 8.8 MG/DL (ref 8.6–10.5)
CHLORIDE SERPL-SCNC: 104 MMOL/L (ref 98–107)
CO2 SERPL-SCNC: 20 MMOL/L (ref 22–29)
CREAT SERPL-MCNC: 0.93 MG/DL (ref 0.76–1.27)
EGFRCR SERPLBLD CKD-EPI 2021: 90.6 ML/MIN/1.73
GLUCOSE BLDC GLUCOMTR-MCNC: 195 MG/DL (ref 70–130)
GLUCOSE BLDC GLUCOMTR-MCNC: 198 MG/DL (ref 70–130)
GLUCOSE BLDC GLUCOMTR-MCNC: 240 MG/DL (ref 70–130)
GLUCOSE SERPL-MCNC: 286 MG/DL (ref 65–99)
POTASSIUM SERPL-SCNC: 4.3 MMOL/L (ref 3.5–5.2)
SODIUM SERPL-SCNC: 137 MMOL/L (ref 136–145)

## 2024-07-30 PROCEDURE — 80048 BASIC METABOLIC PNL TOTAL CA: CPT | Performed by: NURSE PRACTITIONER

## 2024-07-30 PROCEDURE — 25010000002 VANCOMYCIN 10 G RECONSTITUTED SOLUTION: Performed by: INTERNAL MEDICINE

## 2024-07-30 PROCEDURE — 63710000001 INSULIN GLARGINE PER 5 UNITS: Performed by: NURSE PRACTITIONER

## 2024-07-30 PROCEDURE — 63710000001 INSULIN LISPRO (HUMAN) PER 5 UNITS: Performed by: INTERNAL MEDICINE

## 2024-07-30 PROCEDURE — 82948 REAGENT STRIP/BLOOD GLUCOSE: CPT

## 2024-07-30 PROCEDURE — 99252 IP/OBS CONSLTJ NEW/EST SF 35: CPT | Performed by: NURSE PRACTITIONER

## 2024-07-30 PROCEDURE — 25810000003 SODIUM CHLORIDE 0.9 % SOLUTION: Performed by: INTERNAL MEDICINE

## 2024-07-30 PROCEDURE — 25010000002 CEFEPIME PER 500 MG: Performed by: INTERNAL MEDICINE

## 2024-07-30 RX ORDER — DOXYCYCLINE HYCLATE 100 MG/1
100 CAPSULE ORAL 2 TIMES DAILY
Qty: 20 CAPSULE | Refills: 0 | Status: SHIPPED | OUTPATIENT
Start: 2024-07-30 | End: 2024-08-09

## 2024-07-30 RX ADMIN — INSULIN LISPRO 5 UNITS: 100 INJECTION, SOLUTION INTRAVENOUS; SUBCUTANEOUS at 08:55

## 2024-07-30 RX ADMIN — LAMOTRIGINE 200 MG: 100 TABLET ORAL at 09:08

## 2024-07-30 RX ADMIN — APIXABAN 5 MG: 5 TABLET, FILM COATED ORAL at 08:58

## 2024-07-30 RX ADMIN — ASPIRIN 81 MG: 81 TABLET, CHEWABLE ORAL at 08:58

## 2024-07-30 RX ADMIN — METFORMIN HCL 1000 MG: 500 TABLET ORAL at 17:18

## 2024-07-30 RX ADMIN — PANTOPRAZOLE SODIUM 40 MG: 40 TABLET, DELAYED RELEASE ORAL at 05:54

## 2024-07-30 RX ADMIN — METOPROLOL TARTRATE 100 MG: 100 TABLET, FILM COATED ORAL at 08:58

## 2024-07-30 RX ADMIN — TAMSULOSIN HYDROCHLORIDE 0.4 MG: 0.4 CAPSULE ORAL at 08:59

## 2024-07-30 RX ADMIN — EZETIMIBE 10 MG: 10 TABLET ORAL at 09:09

## 2024-07-30 RX ADMIN — ISOSORBIDE MONONITRATE 120 MG: 60 TABLET, EXTENDED RELEASE ORAL at 09:00

## 2024-07-30 RX ADMIN — INSULIN LISPRO 3 UNITS: 100 INJECTION, SOLUTION INTRAVENOUS; SUBCUTANEOUS at 17:18

## 2024-07-30 RX ADMIN — CEFEPIME 2000 MG: 2 INJECTION, POWDER, FOR SOLUTION INTRAVENOUS at 14:30

## 2024-07-30 RX ADMIN — SODIUM CHLORIDE 1750 MG: 9 INJECTION, SOLUTION INTRAVENOUS at 10:29

## 2024-07-30 RX ADMIN — INSULIN LISPRO 3 UNITS: 100 INJECTION, SOLUTION INTRAVENOUS; SUBCUTANEOUS at 12:25

## 2024-07-30 RX ADMIN — EMPAGLIFLOZIN 10 MG: 10 TABLET, FILM COATED ORAL at 08:59

## 2024-07-30 RX ADMIN — INSULIN GLARGINE 40 UNITS: 100 INJECTION, SOLUTION SUBCUTANEOUS at 08:55

## 2024-07-30 RX ADMIN — CEFEPIME 2000 MG: 2 INJECTION, POWDER, FOR SOLUTION INTRAVENOUS at 05:54

## 2024-07-30 RX ADMIN — DOCUSATE SODIUM 100 MG: 100 CAPSULE, LIQUID FILLED ORAL at 08:58

## 2024-07-30 RX ADMIN — LEVETIRACETAM 1500 MG: 500 TABLET, FILM COATED ORAL at 09:00

## 2024-07-30 RX ADMIN — SACUBITRIL AND VALSARTAN 1 TABLET: 97; 103 TABLET, FILM COATED ORAL at 08:59

## 2024-07-30 NOTE — CONSULTS
[facility] - PODIATRY    Today's Date: 07/30/24     Patient Name: Carlos A Boyer Jr.  MRN: [id number]  [facility]: [id number]  PCP: Vinayak Correia PA  Referring Provider: No ref. provider found  Attending Provider: Willard Montana DO  Length of Stay: 3    SUBJECTIVE   Chief Complaint: Right great toe swelling    HPI: Carlos A Boyer Jr., a 64 y.o.male,  who presented to the [facility] ED on 7/27/2024 with pain, swelling, and redness to his right great toe. He has a history of arthritis, asthma, carotid, diabetes mellitus, fatty liver, GERD, Hyperlipidemia, SOB, Stroke.  Patient underwent a right hallux arthrodesis with Dr. Villa on 7/3/2024.  He reports his wound has healed and he was seen in wound care on 7/25/2024 and his wound has healed.  Denies drainage or open areas.  Patient reports he has neuropathy, but does not know of any injury that occurred to the toe.  Right foot xray shows soft tissue swelling.  Patient reports pain to toe only when toe is pressed.  Patient reports he has been using his CAM boot for ambulation. Patient not currently wearing CAM boot and reports it is in his closet.  Denies any constitutional symptoms. No other pedal complaints at this time.    Past Medical History:   Diagnosis Date    Arthritis     Asthma     Cancer     skin    Carotid disease, bilateral     Cellulitis     right foot - on antibiotics 6-    Chest pain     Chronic diastolic congestive heart failure 09/07/2019    Chronic sinusitis     Maribell bullosa     Coronary artery disease involving native coronary artery of native heart with unstable angina pectoris 01/17/2017    Deviated septum     Diabetes mellitus     Difficulty urinating     Diverticulitis     Enlarged prostate     Fatty liver     GERD (gastroesophageal reflux disease)     Cachil DeHe (hard of hearing)     Does have hearing aids    Hyperlipidemia LDL goal <70 02/02/2017    Hypertrophy of nasal turbinates     Keratoderma     Kidney stone      Lung nodules     lower right lung    Migraine     Murmur, heart     Myocardial infarction     Obesity     Paroxysmal atrial fibrillation 07/11/2019    Personal history of COVID-19 07/2021    PONV (postoperative nausea and vomiting)     Primary hypertension 10/16/2016    Psoriasis     Seizures     Sinus congestion     Skin cancer     Sleep apnea     not using cpap    SOB (shortness of breath)     Stroke     mild weakness on right side    UTI (urinary tract infection)      Past Surgical History:   Procedure Laterality Date    CARDIAC CATHETERIZATION  01/2016    Dr. Broadbent; widely patent previously placed stents in the left anterior descending and obstructive disease involving the diagonal branch which was treated medically    CARDIAC CATHETERIZATION N/A 07/14/2017    Procedure: Left Heart Cath;  Surgeon: Wade Ramey MD;  Location:  [facility] CATH INVASIVE LOCATION;  Service:     CARDIAC CATHETERIZATION Left 10/15/2018    Procedure: Cardiac Catheterization/Vascular Study;  Surgeon: Wade Ramey MD;  Location:  [facility] CATH INVASIVE LOC[facility];  Service: Cardiology    CARDIAC CATHETERIZATION  10/15/2018    Procedure: Functional Flow Purcell;  Surgeon: Wade Ramey MD;  Location:  [facility] CATH INV[facility];  Service: Cardiology    CARDIAC CATHETERIZATION N/A 10/15/2018    Procedure: Left ventriculography;  Surgeon: Wade Ramey MD;  Location:  [facility] CATH INVASIVE [facility];  Service: Cardiology    CARDIAC CATHETERIZATION Left 06/26/2019    Procedure: Cardiac Catheterization/Vascular Study VEL OK  HE WILL WAIT 1 YEAR FOR SHOULDER SURGERY ;  Surgeon: Wade Ramey MD;  Location:  [facility] CATH [facility];  Service: Cardiology    CARDIAC CATHETERIZATION Left 04/30/2021    Procedure: Coronary angiography;  Surgeon: Sahil Llamas MD;  Location:  [facility] CATH INVASIVE [facility];  Service: Cardiology;  Laterality: Left;    CARDIAC CATHETERIZATION N/A 04/30/2021    Procedure: Percutaneous Coronary Intervention;  Surgeon:  Sahil Llamas MD;  Location: [facility] CATH INVASIVE LOCATION;  Service: Cardiology;  Laterality: N/A;    CARDIAC CATHETERIZATION N/A 11/09/2022    Procedure: Left Heart Cath with SVGs;  Surgeon: Wade Ramey MD;  Location: [facility] CATH INVASIVE LOCATION;  Service: Cardiology;  Laterality: N/A;    CARPAL TUNNEL RELEASE      January 2024 and pther hand February 2024    CHOLECYSTECTOMY      CHOLECYSTECTOMY WITH INTRAOPERATIVE CHOLANGIOGRAM N/A 08/01/2018    Procedure: CHOLECYSTECTOMY LAPAROSCOPIC INTRAOPERATIVE CHOLANGIOGRAM;  Surgeon: Shane Ann MD;  Location: [facility] OR;  Service: General    COLONOSCOPY N/A 07/14/2020    Procedure: COLONOSCOPY WITH ANESTHESIA;  Surgeon: Anupam Morales DO;  Location: [facility] ENDOSCOPY;  Service: Gastroenterology;  Laterality: N/A;  pre: abdominal pain  post: diverticulosis  Vinayak Correia PA    CORONARY ANGIOPLASTY      CORONARY ARTERY BYPASS GRAFT N/A 07/06/2019    Procedure: CABG X2 WITH LIMA, LEFT LEG OVH, AND PLACEMENT OF LEFT FEMORAL ARTERIAL LINE;  Surgeon: Steven Tang MD;  Location: [facility] OR;  Service: Cardiothoracic    CORONARY STENT PLACEMENT      x 6    CYSTOSCOPY TRANSURETHRAL RESECTION OF PROSTATE N/A 01/23/2023    Procedure: CYSTOSCOPY TRANSURETHRAL RESECTION OF PROSTATE;  Surgeon: Latrell Pope MD;  Location: [facility] OR;  Service: Urology;  Laterality: N/A;    ENDOSCOPIC FUNCTIONAL SINUS SURGERY (FESS) Bilateral 12/13/2017    Procedure: PROCEDURE PERFORMED:  Bilateral functional endoscopic anterior ethmoidectomy with bilateral middle meatal antrostomy Septoplasty Right kathia bullosa resection Bilateral inferior turbinate reduction via Coblation;  Surgeon: Mayank Ibarra MD;  Location: [facility] OR;  Service:     ENDOSCOPY N/A 07/30/2018    Procedure: ESOPHAGOGASTRODUODENOSCOPY WITH ANESTHESIA;  Surgeon: Benitez Mas MD;  Location: [facility] ENDOSCOPY;  Service: Gastroenterology    ENDOSCOPY N/A 07/14/2020    Procedure:  ESOPHAGOGASTRODUODENOSCOPY WITH ANESTHESIA;  Surgeon: Anupam Morales DO;  Location:  [facility] ENDOSCOPY;  Service: Gastroenterology;  Laterality: N/A;  pre: abdominal pain  post: esophagitis  Vinayak Correia PA    HERNIA REPAIR      x2 inguinal area    KIDNEY STONE SURGERY      KNEE ARTHROSCOPY Right 2022    Procedure: RIGHT KNEE PARTIAL LATERAL MENISCECTOMY;  Surgeon: Pedro Pablo Song MD;  Location:  [facility] OR;  Service: Orthopedics;  Laterality: Right;    KNEE SURGERY Right     OTHER SURGICAL HISTORY      urolift    PROSTATE SURGERY      Dr. Badillo -     PULMONARY ARTERY PRESSURE SENSOR IMPLANT N/A 2023    Procedure: PA Pressure Sensor Implant;  Surgeon: Wade Ramey MD;  Location:  [facility] CATH INVASIVE LOC[facility];  Service: Cardiology;  Laterality: N/A;    ROTATOR CUFF REPAIR Right     SLEEP ENDOSCOPY N/A 2023    Procedure: Videosleep endoscopy;  Surgeon: Mayank Ibarra MD;  Location:  [facility] OR;  Service: ENT;  Laterality: N/A;    THUMB AMPUTATION Left     partial    TOE FUSION Right 7/3/2024    Procedure: Hallux Interphalangeal Joint Arthrodesis, Bone Graft Ford - Right Foot;  Surgeon: Hernan Villa DPM;  Location:  [facility] OR;  Service: Podiatry;  Laterality: Right;    TOE SURGERY      great toe     Family History   Problem Relation Age of Onset    Heart disease Father     COPD Mother     Hypertension Mother     Asthma Mother     No Known Problems Sister     Colon cancer Paternal Uncle     Prostate cancer Maternal Grandfather     No Known Problems Sister     Colon cancer Maternal Grandmother     Colon polyps Maternal Grandmother      Social History     Socioeconomic History    Marital status:    Tobacco Use    Smoking status: Former     Current packs/day: 0.00     Average packs/day: 1.5 packs/day for 18.0 years (27.0 ttl pk-yrs)     Types: Cigarettes, Cigars     Start date:      Quit date:      Years since quittin.5     Passive exposure: Past     Smokeless tobacco: Former     Types: Chew     Quit date: 2009   Vaping Use    Vaping status: Never Used   Substance and Sexual Activity    Alcohol use: No     Comment: 0    Drug use: No    Sexual activity: Defer     Allergies   Allergen Reactions    Flagyl [Metronidazole] Hives    Atorvastatin Other (See Comments) and Myalgia      - LIPITOR -   Muscle cramps    Ciprofloxacin Hives      - CIPRO -      Current Facility-Administered Medications   Medication Dose Route Frequency Provider Last Rate Last Admin    acetaminophen (TYLENOL) tablet 650 mg  650 mg Oral Q4H PRN Jerman Pemberton MD        Or    acetaminophen (TYLENOL) 160 MG/5ML oral solution 650 mg  650 mg Oral Q4H PRN Jerman Pemberton MD        Or    acetaminophen (TYLENOL) suppository 650 mg  650 mg Rectal Q4H PRN Jerman Pemberton MD        albuterol (PROVENTIL) nebulizer solution 0.083% 2.5 mg/3mL  2.5 mg Nebulization Q6H PRN Jerman Pemberton MD        apixaban (ELIQUIS) tablet 5 mg  5 mg Oral BID Jerman Pemberton MD   5 mg at 07/30/24 0858    aspirin chewable tablet 81 mg  81 mg Oral Daily Jerman Pemberton MD   81 mg at 07/30/24 0858    sennosides-docusate (PERICOLACE) 8.6-50 MG per tablet 2 tablet  2 tablet Oral BID PRN Jerman Pemberton MD        And    polyethylene glycol (MIRALAX) packet 17 g  17 g Oral Daily PRN Jerman Pemberton MD        And    bisacodyl (DULCOLAX) EC tablet 5 mg  5 mg Oral Daily PRN Jerman Pemberton MD        And    bisacodyl (DULCOLAX) suppository 10 mg  10 mg Rectal Daily PRN Jerman Pemberton MD        Calcium Replacement - Follow Nurse / BPA Driven Protocol   Does not apply PRN Jerman Pemberton MD        cefepime 2000 mg IVPB in 100 mL NS (MBP)  2,000 mg Intravenous Q8H Jerman Pemberton MD   2,000 mg at 07/30/24 0554    dextrose (D50W) (25 g/50 mL) IV injection 25 g  25 g Intravenous Q15 Min PRN Jerman Pemberton MD        dextrose (GLUTOSE) oral gel 15 g  15 g Oral Q15 Min PRN Jerman Pemberton  MD JOSE        docusate sodium (COLACE) capsule 100 mg  100 mg Oral Daily Jerman Pemberton MD   100 mg at 07/30/24 0858    empagliflozin (JARDIANCE) tablet 10 mg  10 mg Oral Daily Jerman Pemberton MD   10 mg at 07/30/24 0859    ezetimibe (ZETIA) tablet 10 mg  10 mg Oral Daily Jerman Pemberton MD   10 mg at 07/30/24 0909    fluticasone (FLONASE) 50 MCG/ACT nasal spray 2 spray  2 spray Nasal Daily Jerman Pemberton MD   2 spray at 07/29/24 0802    furosemide (LASIX) tablet 40 mg  40 mg Oral Daily PRN Jerman Pemberton MD        gabapentin (NEURONTIN) capsule 300 mg  300 mg Oral Nightly Jerman Pemberton MD   300 mg at 07/29/24 2037    glucagon (GLUCAGEN) injection 1 mg  1 mg Intramuscular Q15 Min PRN Jerman Pemberton MD        guaiFENesin (MUCINEX) 12 hr tablet 1,200 mg  1,200 mg Oral Daily PRN Jerman Pemberton MD        insulin glargine (LANTUS, SEMGLEE) injection 40 Units  40 Units Subcutaneous Q12H Meghan Salazar APRN   40 Units at 07/30/24 0855    Insulin Lispro (humaLOG) injection 3-14 Units  3-14 Units Subcutaneous 4x Daily AC & at Bedtime Jerman Pemberton MD   3 Units at 07/30/24 1225    isosorbide mononitrate (IMDUR) 24 hr tablet 120 mg  120 mg Oral Daily Jerman Pemberton MD   120 mg at 07/30/24 0900    lamoTRIgine (LaMICtal) tablet 200 mg  200 mg Oral BID Jerman Pemberton MD   200 mg at 07/30/24 0908    levETIRAcetam (KEPPRA) tablet 1,500 mg  1,500 mg Oral Daily Jerman Pemberton MD   1,500 mg at 07/30/24 0900    levETIRAcetam (KEPPRA) tablet 2,000 mg  2,000 mg Oral Nightly Jerman Pemberton MD   2,000 mg at 07/29/24 2036    Magnesium Low Dose Replacement - Follow Nurse / BPA Driven Protocol   Does not apply PRN Jerman Pemberton MD        [Held by provider] metFORMIN (GLUCOPHAGE) tablet 1,000 mg  1,000 mg Oral BID With Meals Jerman Pemberton MD   1,000 mg at 07/28/24 0838    metoprolol tartrate (LOPRESSOR) tablet 100 mg  100 mg Oral BID Jerman Pemberton MD   100 mg  at 07/30/24 0858    nitroglycerin (NITROSTAT) SL tablet 0.4 mg  0.4 mg Sublingual Q5 Min PRN Jerman Pemberton MD        ondansetron (ZOFRAN) injection 4 mg  4 mg Intravenous Q6H PRN Jerman Pemberton MD        oxyCODONE-acetaminophen (PERCOCET) 5-325 MG per tablet 1 tablet  1 tablet Oral Q8H PRN Jerman Pemberton MD   1 tablet at 07/29/24 1037    pantoprazole (PROTONIX) EC tablet 40 mg  40 mg Oral Q AM Jerman Pemberton MD   40 mg at 07/30/24 0554    Phosphorus Replacement - Follow Nurse / BPA Driven Protocol   Does not apply PRN Jerman Pemberton MD        Potassium Replacement - Follow Nurse / BPA Driven Protocol   Does not apply PRN Jerman Pemberton MD        rosuvastatin (CRESTOR) tablet 20 mg  20 mg Oral Nightly Jerman Pemberton MD   20 mg at 07/29/24 2037    sacubitril-valsartan (ENTRESTO)  MG tablet 1 tablet  1 tablet Oral BID Jerman Pemberton MD   1 tablet at 07/30/24 0859    sodium chloride 0.9 % flush 10 mL  10 mL Intravenous Q12H Jerman Pemberton MD   10 mL at 07/29/24 2037    sodium chloride 0.9 % flush 10 mL  10 mL Intravenous PRN Jermna Pemberton MD        sodium chloride 0.9 % infusion 40 mL  40 mL Intravenous PRN Jerman Pemberton MD        spironolactone (ALDACTONE) tablet 50 mg  50 mg Oral Daily Jerman Pemberton MD   50 mg at 07/29/24 0800    tamsulosin (FLOMAX) 24 hr capsule 0.4 mg  0.4 mg Oral Daily Jerman Pemberton MD   0.4 mg at 07/30/24 0859    vancomycin 1750 mg/500 mL 0.9% NS IVPB ([facility])  1,750 mg Intravenous Q12H Jerman Pemberton MD   1,750 mg at 07/30/24 1029     Review of Systems   Constitutional:  Negative for activity change.   HENT:  Negative for congestion.    Respiratory:  Negative for apnea and shortness of breath.    Cardiovascular:  Positive for leg swelling.   Gastrointestinal:  Negative for abdominal pain.   Musculoskeletal:  Positive for arthralgias. Negative for back pain.   Skin:  Positive for color change.   Neurological:  Positive  for numbness.   Psychiatric/Behavioral:  Negative for agitation, behavioral problems and confusion.        OBJECTIVE     Vitals:    24 1100   BP: 148/91   Pulse: 64   Resp: 16   Temp: 97.9 °F (36.6 °C)   SpO2: 97%       PHYSICAL EXAM  GEN:   Pt presents in hospital bed. Accompanied by none.     Foot/Ankle Exam    GENERAL  Appearance:  appears stated age  Orientation:  AAOx3  Affect:  appropriate  Gait:  non-weight bearing    VASCULAR     Right Foot Vascularity   Dorsalis pedis:  1+  Posterior tibial:  1+  Skin temperature:  warm  Edema gradin+ and non-pitting  CFT:  3     Left Foot Vascularity   Dorsalis pedis:  1+  Posterior tibial:  1+  Skin temperature:  warm  Edema grading:  None  CFT:  3     NEUROLOGIC     Right Foot Neurologic   Light touch sensation: diminished  Vibratory sensation: diminished  Hot/Cold sensation: diminished     Left Foot Neurologic   Light touch sensation: diminished  Vibratory sensation: diminished  Hot/Cold sensation:  diminished    MUSCULOSKELETAL     Right Foot Musculoskeletal   Tenderness:  toe 1 tenderness      MUSCLE STRENGTH     Right Foot Muscle Strength   Foot dorsiflexion:  4+  Foot plantar flexion:  4+  Foot inversion:  4+  Foot eversion:  4+     Left Foot Muscle Strength   Foot dorsiflexion:  4+  Foot plantar flexion:  4+  Foot inversion:  4+  Foot eversion:  4+    DERMATOLOGIC      Right Foot Dermatologic   Skin  Positive for skin changes.      Right foot additional comments: Swelling and redness to right hallux    No open or draining areas.    See attached photos                   RADIOLOGY/NUCLEAR:  MRI Foot Right Without Contrast    Result Date: 2024  Narrative: EXAMINATION:  MRI FOOT RIGHT WO CONTRAST-  2024 10:00 AM  HISTORY: Evaluate for osteomyelitis. L03.031-Cellulitis of right toe. The patient had fusion of the great toe on 7/3/2024.  COMPARISON: X-ray on 2024.  TECHNIQUE: Multiplanar and multisequence imaging was performed in a high-field  magnet without contrast.  FINDINGS: The patient has a corticated screw within the great toe. There is T1 low signal and T2 high signal in the bone marrow of the great toe. There has been osteotomy at the interphalangeal joint. There is fatty atrophy of intrinsic foot muscles. There is diffuse subcutaneous edema throughout the foot and more severe involving the great toe.       Impression: 1. There is diffuse edema throughout the proximal and distal phalanx of the great toe. The patient had fairly recent surgery on 7/3/2024. This edema could be postoperative or due to osteomyelitis. There is some susceptibility artifact due to the hardware within the toe. This makes evaluation of cortex somewhat difficult. Pre and postcontrast imaging might be more helpful to further evaluate for osteomyelitis. 2. There is diffuse edema throughout the subcutaneous fat of the visualized foot, more severe in the great toe. 3. There is severe fatty atrophy of visualized intrinsic foot muscles likely related to denervation.  This report was signed and finalized on 7/28/2024 3:58 PM by Dr. Wojciech Davidson MD.      XR Foot 3+ View Right    Result Date: 7/27/2024  Narrative: EXAMINATION: XR FOOT 3+ VW RIGHT- 7/27/2024 12:36 PM  HISTORY: Since surgery possible infection.  REPORT: 3 views of the right foot were obtained.  COMPARISON: Right foot x-rays 7/3/2024.  There is moderate swelling of the right great toe, this appears increased, no soft tissue gas is identified. There is previous osteotomy and arthrodesis at the interphalangeal joint of the right great toe with a single compression screw which appears to be in satisfactory position. Compared with the recent x-rays there is slight widening of the osteotomy space. No freddie osseous destruction is identified. Moderate overgrowth of the first metatarsal head as before. The other joint spaces are preserved. No acute fracture.      Impression: 1. Increased soft tissue swelling of the right  great toe, correlate clinically for soft tissue infection. No obvious bony destruction is identified, postsurgical changes as described, with slight widening of the osteotomy at the interphalangeal joint level. No significant bony bridging associated with the arthrodesis at this time.  This report was signed and finalized on 7/27/2024 12:39 PM by Dr. Cyril Reyna MD.      XR Foot 2 View Right    Result Date: 7/3/2024  Narrative: EXAMINATION: XR FOOT 2 VW RIGHT-  7/3/2024 12:18 PM  HISTORY: post-op; L97.512-Non-pressure chronic ulcer of other part of right foot with fat layer exposed; E11.621-Type 2 diabetes mellitus with foot ulcer; L97.512-Non-pressure chronic ulcer of other part of right foot with fat layer exposed; M20.61-Acquired deformities of toe(s), unspecified, right foot  2 view RIGHT foot exam.  Postoperative imaging shows screw placement for first interphalangeal joint arthrodesis. Calcaneal bone harvest site noted.  Metatarsals and toes show no acute fracture.  Midfoot joints are intact.  There is prominent inferior calcaneal spurring.      Impression: 1. Postsurgical changes with screw placement at the first interphalangeal joint.    This report was signed and finalized on 7/3/2024 1:55 PM by Dr. Celso Wynn MD.      XR chest 2 vw    Result Date: 7/1/2024  Narrative: EXAMINATION: XR CHEST 2 VW- 7/1/2024 3:49 PM  HISTORY: pre-op; L97.512-Non-pressure chronic ulcer of other part of right foot with fat layer exposed; E11.621-Type 2 diabetes mellitus with foot ulcer; L97.512-Non-pressure chronic ulcer of other part of right foot with fat layer exposed; M20.61-Acquired deformities of toe(s), unspecified, right foot.  REPORT: Frontal and lateral views of the chest were obtained.  COMPARISON: Chest x-rays of 5/20/2024.  There is tissue markings are somewhat coarse as before, no focal pulmonary infiltrate is identified. Heart size is normal. There is no pneumothorax or pleural effusion. Previous median  sternotomy is noted. No acute osseous abnormality is identified. Moderate degenerative endplate spurring is seen throughout the thoracic spine.      Impression: Chronic lung changes which appear stable, there is previous CABG. No acute cardiopulmonary abnormality.  This report was signed and finalized on 7/1/2024 3:51 PM by Dr. Cyril Reyna MD.       LABORATORY/CULTURE RESULTS:  Results from last 7 days   Lab Units 07/29/24  0433 07/27/24  1230   WBC 10*3/mm3 5.17 5.19   HEMOGLOBIN g/dL 13.0 14.6   HEMATOCRIT % 39.6 43.9   PLATELETS 10*3/mm3 93* 111*     Results from last 7 days   Lab Units 07/30/24  0633 07/29/24  0433 07/28/24  0520 07/27/24  1230   SODIUM mmol/L 137 136 138 137   POTASSIUM mmol/L 4.3 4.5 4.5 4.4   CHLORIDE mmol/L 104 103 105 104   CO2 mmol/L 20.0* 22.0 20.0* 19.0*   BUN mg/dL 18 21 21 23   CREATININE mg/dL 0.93 1.07 1.09 1.34*   CALCIUM mg/dL 8.8 9.3 9.3 9.5   BILIRUBIN mg/dL  --   --   --  0.9   ALK PHOS U/L  --   --   --  86   ALT (SGPT) U/L  --   --   --  19   AST (SGOT) U/L  --   --   --  15   GLUCOSE mg/dL 286* 225* 184* 243*         Microbiology Results (last 10 days)       Procedure Component Value - Date/Time    MRSA Screen, PCR (Inpatient) - Swab, Nares [[id number]]  (Normal) Collected: 07/27/24 2055    Lab Status: Final result Specimen: Swab from Nares Updated: 07/27/24 2211     MRSA PCR No MRSA Detected    Narrative:      The negative predictive value of this diagnostic test is high and should only be used to consider de-escalating anti-MRSA therapy. A positive result may indicate colonization with MRSA and must be correlated clinically.    Blood Culture - Blood, Hand, Right [[id number]]  (Normal) Collected: 07/27/24 1832    Lab Status: Preliminary result Specimen: Blood from Hand, Right Updated: 07/29/24 1900     Blood Culture No growth at 2 days    Blood Culture - Blood, Arm, Left [[id number]]  (Normal) Collected: 07/27/24 1333    Lab Status: Preliminary result Specimen: Blood from  Arm, Left Updated: 07/30/24 1401     Blood Culture No growth at 3 days    Blood Culture - Blood, Arm, Left [[id number]]  (Normal) Collected: 07/27/24 1230    Lab Status: Preliminary result Specimen: Blood from Arm, Left Updated: 07/30/24 1315     Blood Culture No growth at 3 days            PATHOLOGY RESULTS:       ASSESSMENT/PLAN   Right great toe cellulitis  Diabetes Mellitus    Review of labs, imaging, and other provider documentation  Comprehensive lower extremity examination and evaluation was performed.     At this time there is little concern for osteomyelitis.  Most likely patient has edema and redness due post operative healing from his hallux joint arthrodesis less than a month ago.    Patient to continue use of CAM boot with only weight bearing to heel.      Patient to follow up in Wound care on Thursday as previously scheduled.     Thank you for the consult.  We will continue to monitor patient while he is admitted.            This document has been electronically signed by TEENA Nice on July 30, 2024 14:27 CDT

## 2024-07-30 NOTE — DISCHARGE SUMMARY
[facility] Medicine Services  DISCHARGE SUMMARY       Date of Admission: 7/27/2024  Date of Discharge:  7/30/2024  Primary Care Physician: Vinayak Correia PA    Presenting Problem/History of Present Illness:  Swelling, pain, erythema right great toe    Final Discharge Diagnoses:  Active Hospital Problems    Diagnosis     **Cellulitis of right toe     Chronic heart failure with preserved ejection fraction (HFpEF)     Chronic anticoagulation     Chronic diastolic congestive heart failure with preserved ejection fraction     Primary hypertension     Type 2 diabetes mellitus with circulatory disorder, with long-term current use of insulin        Consults: Dr. Villa, podiatry    Procedures Performed: None    Pertinent Test Results:   Results for orders placed during the hospital encounter of 04/17/23    Adult Transthoracic Echo Complete W/ Cont if Necessary Per Protocol    Interpretation Summary    Left ventricular systolic function is normal. Left ventricular ejection fraction appears to be 61 - 65%.    The following left ventricular wall segments are very mildly hypokinetic: apical inferior, apical septal and apex hypokinetic.    Left ventricular diastolic function is consistent with (grade II w/high LAP) pseudonormalization.    Left atrial volume is mildly increased.    Estimated right ventricular systolic pressure from tricuspid regurgitation is normal (<35 mmHg).    Normal size and function of the right ventricle.    No significant valvular pathology.    No significant change compared to prior exam from 4/29/21.      Imaging Results (All)       Procedure Component Value Units Date/Time    MRI Foot Right Without Contrast [[id number]] Collected: 07/28/24 1546     Updated: 07/28/24 1601    Narrative:      EXAMINATION:  MRI FOOT RIGHT WO CONTRAST-  7/28/2024 10:00 AM     HISTORY: Evaluate for osteomyelitis. L03.031-Cellulitis of right toe.  The patient had fusion of the great  toe on 7/3/2024.     COMPARISON: X-ray on 7/27/2024.     TECHNIQUE: Multiplanar and multisequence imaging was performed in a  high-field magnet without contrast.     FINDINGS: The patient has a corticated screw within the great toe. There  is T1 low signal and T2 high signal in the bone marrow of the great toe.  There has been osteotomy at the interphalangeal joint. There is fatty  atrophy of intrinsic foot muscles. There is diffuse subcutaneous edema  throughout the foot and more severe involving the great toe.          Impression:      1. There is diffuse edema throughout the proximal and distal phalanx of  the great toe. The patient had fairly recent surgery on 7/3/2024. This  edema could be postoperative or due to osteomyelitis. There is some  susceptibility artifact due to the hardware within the toe. This makes  evaluation of cortex somewhat difficult. Pre and postcontrast imaging  might be more helpful to further evaluate for osteomyelitis.  2. There is diffuse edema throughout the subcutaneous fat of the  visualized foot, more severe in the great toe.  3. There is severe fatty atrophy of visualized intrinsic foot muscles  likely related to denervation.     This report was signed and finalized on 7/28/2024 3:58 PM by Dr. Wojciech Davidson MD.       XR Foot 3+ View Right [[id number]] Collected: 07/27/24 1236     Updated: 07/27/24 1242    Narrative:      EXAMINATION: XR FOOT 3+ VW RIGHT- 7/27/2024 12:36 PM     HISTORY: Since surgery possible infection.     REPORT: 3 views of the right foot were obtained.     COMPARISON: Right foot x-rays 7/3/2024.     There is moderate swelling of the right great toe, this appears  increased, no soft tissue gas is identified. There is previous osteotomy  and arthrodesis at the interphalangeal joint of the right great toe with  a single compression screw which appears to be in satisfactory position.  Compared with the recent x-rays there is slight widening of the  osteotomy space.  No freddie osseous destruction is identified. Moderate  overgrowth of the first metatarsal head as before. The other joint  spaces are preserved. No acute fracture.       Impression:      1. Increased soft tissue swelling of the right great toe, correlate  clinically for soft tissue infection. No obvious bony destruction is  identified, postsurgical changes as described, with slight widening of  the osteotomy at the interphalangeal joint level. No significant bony  bridging associated with the arthrodesis at this time.     This report was signed and finalized on 7/27/2024 12:39 PM by Dr. Cyril Reyna MD.             LAB RESULTS:      Lab 07/29/24  0433 07/27/24  1638 07/27/24  1230   WBC 5.17  --  5.19   HEMOGLOBIN 13.0  --  14.6   HEMATOCRIT 39.6  --  43.9   PLATELETS 93*  --  111*   NEUTROS ABS 3.32  --  2.89   IMMATURE GRANS (ABS) 0.01  --  0.02   LYMPHS ABS 1.21  --  1.64   MONOS ABS 0.43  --  0.44   EOS ABS 0.18  --  0.17   MCV 90.6  --  90.0   CRP  --   --  0.33   PROCALCITONIN  --   --  0.04   LACTATE  --  1.4 2.3*         Lab 07/30/24  0633 07/29/24  0433 07/28/24  0520 07/27/24  1230   SODIUM 137 136 138 137   POTASSIUM 4.3 4.5 4.5 4.4   CHLORIDE 104 103 105 104   CO2 20.0* 22.0 20.0* 19.0*   ANION GAP 13.0 11.0 13.0 14.0   BUN 18 21 21 23   CREATININE 0.93 1.07 1.09 1.34*   EGFR 90.6 76.5 74.9 58.4*   GLUCOSE 286* 225* 184* 243*   CALCIUM 8.8 9.3 9.3 9.5   MAGNESIUM  --   --  1.9  --    HEMOGLOBIN A1C  --   --  7.00*  --          Lab 07/27/24  1230   TOTAL PROTEIN 7.3   ALBUMIN 4.2   GLOBULIN 3.1   ALT (SGPT) 19   AST (SGOT) 15   BILIRUBIN 0.9   ALK PHOS 86                     Brief Urine Lab Results  (Last result in the past 365 days)        Color   Clarity   Blood   Leuk Est   Nitrite   Protein   CREAT   Urine HCG        06/06/24 0930 Yellow   Clear   Negative   Negative   Negative   Negative                 Microbiology Results (last 10 days)       Procedure Component Value - Date/Time    MRSA Screen,  PCR (Inpatient) - Swab, Nares [[id number]]  (Normal) Collected: 07/27/24 2055    Lab Status: Final result Specimen: Swab from Nares Updated: 07/27/24 2211     MRSA PCR No MRSA Detected    Narrative:      The negative predictive value of this diagnostic test is high and should only be used to consider de-escalating anti-MRSA therapy. A positive result may indicate colonization with MRSA and must be correlated clinically.    Blood Culture - Blood, Hand, Right [[id number]]  (Normal) Collected: 07/27/24 1832    Lab Status: Preliminary result Specimen: Blood from Hand, Right Updated: 07/29/24 1900     Blood Culture No growth at 2 days    Blood Culture - Blood, Arm, Left [[id number]]  (Normal) Collected: 07/27/24 1339    Lab Status: Preliminary result Specimen: Blood from Arm, Left Updated: 07/30/24 1401     Blood Culture No growth at 3 days    Blood Culture - Blood, Arm, Left [[id number]]  (Normal) Collected: 07/27/24 1230    Lab Status: Preliminary result Specimen: Blood from Arm, Left Updated: 07/30/24 1315     Blood Culture No growth at 3 days            Hospital Course: Mr. Boyer presented to [facility] emergency room 7/27/2024 after awakening the morning of admission with pain, swelling, redness right great toe.  He has a history of ulcer on the plantar surface of right big toe for 5 years.  Dr. Villa recently took him to surgery on 7/3/2024 for hallux interphalangeal joint arthrodesis with bone graft.  Patient reported wound healing and he was seen at wound care on 7/25 for dressing change.  No drainage or erythema noted at that time. He has a history of psoriatic arthritis, skin cancer, asthma asthma, bilateral carotid stenosis, chronic diastolic heart failure, coronary artery disease, diabetes, paroxysmal atrial fibrillation, myocardial infarction, stroke, seizure disorder.  Patient reported awakening with swelling, redness, pain right great toe and he was concerned for bone infection.  Lactate 2.3,  creatinine 1.34, WBC 5.19.  X-ray right foot showed increased soft tissue swelling right great toe correlate for soft tissue infection.  No obvious bony destruction identified.  Postsurgical changes with slight widening of the osteotomy interphalangeal joint level.  No significant bony bridging associated with arthrodesis.  Vancomycin, morphine, Zofran given in ER.    He was admitted to the medical floor with cellulitis right great toe.  He awoke7/27 with pain, swelling, erythema right great toe.  Status post right hallux interphalangeal joint arthrodesis with bone graft 7/3 Dr. Villa.  Patient followed in wound care clinic on 7/25 without drainage or erythema.  X-ray right foot shows soft tissue swelling right great toe.  MRI right foot 7/28 notes diffuse edema throughout proximal and distal phalanx of great toe.  Patient had recent surgery 7/3.  Edema could be postop or due to osteomyelitis.  Cefepime and vancomycin ordered on admission.  Patient remains afebrile, white blood cell count normal.  Blood cultures no growth.  Dr. Villa, podiatry consulted but not available till 7/30/2024.  Patient remained on IV antibiotics and Dr. Villa saw patient on 7/30/2024 and felt as though this was normal surgical changes and no evidence of osteomyelitis.  Dr. Villa recommended cam boot, doxycycline 100 mg orally twice daily and follow-up with Dr. Villa in wound clinic on 8/1/2024.  He was cleared for discharge by Dr. Villa.    Patient has a history of diabetes mellitus type 2 with insulin.  Hemoglobin A1c 7 on 7/20/2024.  Accu-Cheks with sliding scale insulin coverage ordered.  Glucoses 195, 286, 209.  Metformin held until patient was evaluated for Dr. Villa case patient would need surgery.  Metformin resumed on 7/30.  Jardiance continued.  Patient uses Humalog 3 times daily at home.  He also takes glargine 80 units in the morning and 100 units at night and will resume home medication regimen at discharge.    Metoprolol  "continued for primary hypertension.  Blood pressure 148/91.    Chronic diastolic heart failure with preserved ejection fraction.  EF 61-65% per echo 4/18/2024.  No exacerbation of symptoms.  Aspirin, statin, beta-blocker, Entresto, Jardiance continued.    Patient on chronic anticoagulation for history of atrial flutter maintaining normal sinus rhythm.  Eliquis continued and served as DVT prophylaxis.    On 7/30/2024, he has been cleared for discharge by Dr. Villa.  After evaluating right great toe, Dr. Villa believes patient with normal postop surgical changes and not related osteomyelitis.  Doxycycline 100 mg twice daily for 10 days recommended by Dr. Villa.  Cam boot right foot.  He does have an appointment to see Dr. Villa in wound care clinic on 8/1/2024.  He will follow-up with Vinayak Correia [facility], [facility] on 8/7/2024.     Physical Exam on Discharge:  /91 (BP Location: Left arm, Patient Position: Sitting)   Pulse 64   Temp 97.9 °F (36.6 °C) (Oral)   Resp 16   Ht 181 cm (71.26\")   Wt 118 kg (260 lb 2.3 oz)   SpO2 97%   BMI 36.02 kg/m²   Physical Exam  Vitals and nursing note reviewed.   Constitutional:       Appearance: He is obese.      Comments: Sitting up on side of bed.  No oxygen use.   HENT:      Head: Normocephalic and atraumatic.      Nose: No congestion.      Mouth/Throat:      Pharynx: Oropharynx is clear. No oropharyngeal exudate or posterior oropharyngeal erythema.   Eyes:      Extraocular Movements: Extraocular movements intact.      Pupils: Pupils are equal, round, and reactive to light.   Cardiovascular:      Rate and Rhythm: Normal rate and regular rhythm.      Heart sounds: No murmur heard.  Pulmonary:      Breath sounds: No wheezing, rhonchi or rales.      Comments: No oxygen in use.  Abdominal:      General: There is no distension.      Palpations: Abdomen is soft.      Tenderness: There is no abdominal tenderness.   Genitourinary:     Comments: Voiding.  Musculoskeletal:         " General: Swelling (Right great toe) and tenderness (Right great toe) present.      Cervical back: Normal range of motion and neck supple.   Skin:     General: Skin is warm and dry.      Findings: Erythema (No erythema right great toe) present.   Neurological:      General: No focal deficit present.      Mental Status: He is alert and oriented to person, place, and time.   Psychiatric:         Mood and Affect: Mood normal.         Behavior: Behavior normal.         Thought Content: Thought content normal.         Judgment: Judgment normal.         Condition on Discharge: Stablel for discharge home    Discharge Disposition:  Home or Self Care    Discharge Medications:     Discharge Medications        New Medications        Instructions Start Date   doxycycline 100 MG capsule  Commonly known as: VIBRAMYCIN   100 mg, Oral, 2 Times Daily             Continue These Medications        Instructions Start Date   acetaminophen 325 MG tablet  Commonly known as: TYLENOL   325 mg, Oral, Every 6 Hours PRN      albuterol sulfate  (90 Base) MCG/ACT inhaler  Commonly known as: PROVENTIL HFA;VENTOLIN HFA;PROAIR HFA   2 puffs, Inhalation, Every 6 Hours PRN      apixaban 5 MG tablet tablet  Commonly known as: ELIQUIS   5 mg, Oral, 2 Times Daily      Aspirin 81 MG capsule   81 mg, Oral, Daily      carboxymethylcellulose 0.5 % solution  Commonly known as: REFRESH PLUS   1 drop, Both Eyes, 4 Times Daily PRN      docusate sodium 100 MG capsule  Commonly known as: COLACE   100 mg, Oral, Daily PRN      empagliflozin 25 MG tablet tablet  Commonly known as: JARDIANCE   25 mg, Oral, Daily      Entresto  MG tablet  Generic drug: sacubitril-valsartan   1 tablet, Oral, 2 Times Daily      ezetimibe 10 MG tablet  Commonly known as: ZETIA   10 mg, Oral, Daily      fluticasone 50 MCG/ACT nasal spray  Commonly known as: FLONASE   2 sprays, Nasal, Daily      furosemide 40 MG tablet  Commonly known as: Lasix   40 mg, Oral, Daily PRN       gabapentin 300 MG capsule  Commonly known as: NEURONTIN   300 mg, Oral, Nightly      guaiFENesin 600 MG 12 hr tablet  Commonly known as: MUCINEX   1,200 mg, Oral, Daily PRN      insulin aspart 100 UNIT/ML solution pen-injector sc pen  Commonly known as: novoLOG FLEXPEN   50 Units, Subcutaneous, 3 Times Daily With Meals, Can use 5 to 6 more units if needed in early evening.      INSULIN GLARGINE-YFGN SC   100 Units, Subcutaneous, 2 Times Daily      isosorbide mononitrate 120 MG 24 hr tablet  Commonly known as: IMDUR   120 mg, Oral, Daily      lamoTRIgine 200 MG tablet  Commonly known as: LaMICtal   200 mg, Oral, 2 Times Daily      levETIRAcetam 500 MG tablet  Commonly known as: KEPPRA   1,500 mg, Oral, Every Morning      levETIRAcetam 500 MG tablet  Commonly known as: KEPPRA   2,000 mg, Oral, Nightly      metFORMIN 1000 MG tablet  Commonly known as: GLUCOPHAGE   1,000 mg, Oral, 2 Times Daily With Meals      metoprolol tartrate 100 MG tablet  Commonly known as: LOPRESSOR   100 mg, Oral, 2 Times Daily      multivitamin tablet tablet  Commonly known as: THERAGRAN   1 tablet, Oral, Daily      nitroglycerin 0.4 MG SL tablet  Commonly known as: NITROSTAT   0.4 mg, Sublingual, Every 5 Minutes PRN, Take no more than 3 doses in 15 minutes.      Nurtec 75 MG dispersible tablet  Generic drug: Rimegepant Sulfate   1 tablet, Oral, 3 Times Daily PRN      Ozempic (0.25 or 0.5 MG/DOSE) 2 MG/1.5ML solution pen-injector  Generic drug: Semaglutide(0.25 or 0.5MG/DOS)   0.5 mg, Subcutaneous, Weekly, Monday      pantoprazole 40 MG EC tablet  Commonly known as: PROTONIX   40 mg, Oral, 2 Times Daily      polycarbophil 625 MG tablet tablet   625 mg, Oral, Daily      polyethylene glycol 17 g packet  Commonly known as: MIRALAX   17 g, Oral, Daily      psyllium 58.6 % packet  Commonly known as: METAMUCIL   1 packet, Oral, Daily PRN      rosuvastatin 40 MG tablet  Commonly known as: CRESTOR   20 mg, Oral, Nightly      tamsulosin 0.4 MG capsule  24 hr capsule  Commonly known as: FLOMAX   1 capsule, Oral, Daily      triamcinolone 0.1 % ointment  Commonly known as: KENALOG   1 Application, Topical, 2 Times Daily, To feet               Discharge Diet:   Diet Instructions       Diet: Diabetic Diets; Consistent Carbohydrate; Regular (IDDSI 7); Thin (IDDSI 0)      Discharge Diet: Diabetic Diets    Diabetic Diet: Consistent Carbohydrate    Texture: Regular (IDDSI 7)    Fluid Consistency: Thin (IDDSI 0)            Activity at Discharge:   Activity Instructions       Other Activity Instructions      Activity Instructions: Cam boot right foot            Discharge instructions:  1.  For worsening pain, swelling, erythema right great toe seek medical attention.  2.  Doxycycline 1 mg twice daily for 10 days beginning 7/30/2024 per Dr. Villa  3.  CAM Boot right foot  4.  Follow-up with Dr. Villa on 8/1/2024 at 8 AM wound care  5.  Follow-up with Vinayak Correia PA-C on 8/7/2024  6.  Avoid sun exposure while taking doxycycline.        Follow-up Appointments:   Future Appointments   Date Time Provider Department Center   10/17/2024 10:15 AM Ben Burden APRN [facility] CD  [facility]   2/19/2025 10:00 AM Steven Tang MD [facility] CTS  [facility]       Test Results Pending at Discharge: None    Electronically signed by TEENA Rowe, 07/30/24, 15:41 CDT.    Time: 35 minutes.  Discussed with Dr. Daisy Rivers podiatry, patient and wife.    The above documentation resulted from a face-to-face encounter by me Meghan DICKINSON, [facility].

## 2024-07-30 NOTE — PLAN OF CARE
Goal Outcome Evaluation:   Pt A/Ox4. VSS. IV ABX given. Eliquis for VTE. Voiding. Safety maintained. Pt to be discharged to home. Wife will provide ride this afternoon.       1830 Pt left with wife and belongings. Pt transported via .

## 2024-07-30 NOTE — PROGRESS NOTES
"Pharmacy Dosing Service  Pharmacokinetics  Vancomycin Follow-up Evaluation    Assessment/Action/Plan:  Current Order: Vancomycin 1750 mg IVPB every 12 hours  Current end date:8/2/24  Levels: Trough level ordered, due at 2000 7/30/24  Additional antimicrobial agent(s): Cefepime    Dose adjusted yesterday due to sub therapeutic trough level.  Renal function stable.  New trough level ordered, due at 2000 7/30. Pharmacy will continue to follow daily and adjust dose accordingly.     Subjective:  Carlos A Boyer Jr. is a 64 y.o. male currently on Vancomycin 1750 mg IV every 12 hours for the treatment of Bone/Joint Infection (Rt toe cellulitis w possible osteomyelitis), day 4 of treatment.    AUC Model Data:  Regimen: 1750 mg IV every 12 hours.  Exposure target: AUC24 (range)400-600 mg/L.hr   AUC24,ss: 492 mg/L.hr  PAUC*: 90 %  Ctrough,ss: 13.5 mg/L  Pconc*: 9 %  Tox.: 9 %    Objective:  Ht: 181 cm (71.26\"); Wt: 118 kg (260 lb 2.3 oz)  Estimated Creatinine Clearance: 102.4 mL/min (by C-G formula based on SCr of 0.93 mg/dL).   Creatinine   Date Value Ref Range Status   07/30/2024 0.93 0.76 - 1.27 mg/dL Final   07/29/2024 1.07 0.76 - 1.27 mg/dL Final   07/28/2024 1.09 0.76 - 1.27 mg/dL Final   02/14/2024 1.20 0.60 - 1.30 mg/dL Final     Comment:     Serial Number: [id number]:  [id number]   08/25/2023 2.81 (H) 0.60 - 1.30 mg/dL Final   07/18/2023 1.70 (H) 0.60 - 1.30 mg/dL Final     Comment:     Serial Number: [id number]:  [id number]   02/15/2023 1.20 0.60 - 1.30 mg/dL Final     Comment:     Serial Number: [id number]:  [id number]      Lab Results   Component Value Date    WBC 5.17 07/29/2024    WBC 5.19 07/27/2024    WBC 4.94 07/01/2024         Lab Results   Component Value Date    VANCOTROUGH 8.20 07/29/2024       Culture Results:  Microbiology Results (last 10 days)       Procedure Component Value - Date/Time    MRSA Screen, PCR (Inpatient) - Swab, Nares [[id number]]  (Normal) Collected: 07/27/24 2055    Lab Status: Final " result Specimen: Swab from Nares Updated: 07/27/24 2211     MRSA PCR No MRSA Detected    Narrative:      The negative predictive value of this diagnostic test is high and should only be used to consider de-escalating anti-MRSA therapy. A positive result may indicate colonization with MRSA and must be correlated clinically.    Blood Culture - Blood, Hand, Right [[id number]]  (Normal) Collected: 07/27/24 1832    Lab Status: Preliminary result Specimen: Blood from Hand, Right Updated: 07/29/24 1900     Blood Culture No growth at 2 days    Blood Culture - Blood, Arm, Left [[id number]]  (Normal) Collected: 07/27/24 1339    Lab Status: Preliminary result Specimen: Blood from Arm, Left Updated: 07/29/24 1400     Blood Culture No growth at 2 days    Blood Culture - Blood, Arm, Left [[id number]]  (Normal) Collected: 07/27/24 1230    Lab Status: Preliminary result Specimen: Blood from Arm, Left Updated: 07/29/24 1315     Blood Culture No growth at 2 days            SUHAIL Amaya RPH   07/30/24 07:57 CDT

## 2024-07-30 NOTE — PLAN OF CARE
Goal Outcome Evaluation:  Plan of Care Reviewed With: patient, spouse        Progress: improving  Outcome Evaluation: Patient A/O x 4. VSS. On room air. Tolerating IV ABT. Right leg elevated. Wife at bedside throughout the night. No new concerns. Safety maintained.

## 2024-07-31 NOTE — OUTREACH NOTE
Prep Survey      Flowsheet Row Responses   [facility] facility patient discharged from? [facility]   Is LACE score < 7 ? No   Eligibility Readm Mgmt   Discharge diagnosis Cellulitis of right toe   Does the patient have one of the following disease processes/diagnoses(primary or secondary)? Other   Does the patient have Home health ordered? No   Is there a DME ordered? No   Prep survey completed? Yes            MESSI SEGURA - Registered Nurse

## 2024-07-31 NOTE — PAYOR COMM NOTE
"Ref:  [id number]     [facility]  FAX  [phone]       Carlos A Goldstein  (64 y.o. Male)       Date of Birth   1960    Social Security Number       Address   [address] [place], [place] [address]    Home Phone   [phone]    MRN   [id number]       [employer]ah[employer]ism   [facility]    Marital Status                               Admission Date   7/27/24    Admission Type   Emergency    Admitting Provider   Willard Montana DO    Attending Provider       Department, Room/Bed   [facility] 3A, 331/1       Discharge Date   7/30/2024    Discharge Disposition   Home or Self Care    Discharge Destination                                 Attending Provider: (none)   Allergies: Flagyl [Metronidazole], Atorvastatin, Ciprofloxacin    Isolation: None   Infection: COVID (History) (04/29/21)   Code Status: Prior    Ht: 181 cm (71.26\")   Wt: 118 kg (260 lb 2.3 oz)    Admission Cmt: None   Principal Problem: Cellulitis of right toe [L03.031]                   Active Insurance as of 7/27/2024       Primary Coverage       Payor Plan Insurance Group Employer/Plan Group    [facility] DEPT [id number]        Payor Plan Address Payor Plan Phone Number Payor Plan Fax Number Effective Dates    [facility] OF COMMUNITY [facility] [phone]  5/14/2024 - None Entered    [address] [address]       [facility] [address]         Subscriber Name Subscriber Birth Date Member ID       CARLOS A GOLDSTEIN  1960 [id number]               Secondary Coverage       Payor Plan Insurance Group Employer/Plan Group    [facility] CC[facility] [employer]        Payor Plan Address Payor Plan Phone Number Payor Plan Fax Number Effective Dates    [address] [phone]  6/1/2020 - None Entered    [facility] [id number]         Subscriber Name Subscriber Birth Date Member ID       CARLOS A GOLDSTEIN SOLOMON CRISOSTOMO 1960 [id number]                     Emergency Contacts        (Rel.) Home Phone Work Phone " Mobile Phone    Daniela Boyer (Spouse) [phone]                 Discharge Summary        Meghan Salazar APRN at 07/30/24 1530       Attestation signed by Willard Montana DO at 07/30/24 1631    I performed a substantive part of the MDM during the patient’s E/M visit. I personally evaluated   and examined the patient. I personally made or approved the documented management plan and acknowledge its risk   of complications.   (Independent Interpretation) My (EKG/X-Ray/US/CT) interpretation see HPI.   (Discussion) Management/test interpretation discussed with APRN.     Agree with discharge plan noted below.  IRIS Marie per podiatry.    HRRR  LCTAB  ABD benign    Electronically signed by Willard Montana DO, 7/30/2024, 16:31 CDT.                          [facility] Medicine Services  DISCHARGE SUMMARY       Date of Admission: 7/27/2024  Date of Discharge:  7/30/2024  Primary Care Physician: Vinayak Correia PA    Presenting Problem/History of Present Illness:  Swelling, pain, erythema right great toe    Final Discharge Diagnoses:  Active Hospital Problems    Diagnosis     **Cellulitis of right toe     Chronic heart failure with preserved ejection fraction (HFpEF)     Chronic anticoagulation     Chronic diastolic congestive heart failure with preserved ejection fraction     Primary hypertension     Type 2 diabetes mellitus with circulatory disorder, with long-term current use of insulin        Consults: Dr. Villa, podiatry    Procedures Performed: None    Pertinent Test Results:   Results for orders placed during the hospital encounter of 04/17/23    Adult Transthoracic Echo Complete W/ Cont if Necessary Per Protocol    Interpretation Summary    Left ventricular systolic function is normal. Left ventricular ejection fraction appears to be 61 - 65%.    The following left ventricular wall segments are very mildly hypokinetic: apical inferior,  apical septal and apex hypokinetic.    Left ventricular diastolic function is consistent with (grade II w/high LAP) pseudonormalization.    Left atrial volume is mildly increased.    Estimated right ventricular systolic pressure from tricuspid regurgitation is normal (<35 mmHg).    Normal size and function of the right ventricle.    No significant valvular pathology.    No significant change compared to prior exam from 4/29/21.      Imaging Results (All)       Procedure Component Value Units Date/Time    MRI Foot Right Without Contrast [[id number]] Collected: 07/28/24 1546     Updated: 07/28/24 1601    Narrative:      EXAMINATION:  MRI FOOT RIGHT WO CONTRAST-  7/28/2024 10:00 AM     HISTORY: Evaluate for osteomyelitis. L03.031-Cellulitis of right toe.  The patient had fusion of the great toe on 7/3/2024.     COMPARISON: X-ray on 7/27/2024.     TECHNIQUE: Multiplanar and multisequence imaging was performed in a  high-field magnet without contrast.     FINDINGS: The patient has a corticated screw within the great toe. There  is T1 low signal and T2 high signal in the bone marrow of the great toe.  There has been osteotomy at the interphalangeal joint. There is fatty  atrophy of intrinsic foot muscles. There is diffuse subcutaneous edema  throughout the foot and more severe involving the great toe.          Impression:      1. There is diffuse edema throughout the proximal and distal phalanx of  the great toe. The patient had fairly recent surgery on 7/3/2024. This  edema could be postoperative or due to osteomyelitis. There is some  susceptibility artifact due to the hardware within the toe. This makes  evaluation of cortex somewhat difficult. Pre and postcontrast imaging  might be more helpful to further evaluate for osteomyelitis.  2. There is diffuse edema throughout the subcutaneous fat of the  visualized foot, more severe in the great toe.  3. There is severe fatty atrophy of visualized intrinsic foot  muscles  likely related to denervation.     This report was signed and finalized on 7/28/2024 3:58 PM by Dr. Wojciech Davidson MD.       XR Foot 3+ View Right [[id number]] Collected: 07/27/24 1236     Updated: 07/27/24 1242    Narrative:      EXAMINATION: XR FOOT 3+ VW RIGHT- 7/27/2024 12:36 PM     HISTORY: Since surgery possible infection.     REPORT: 3 views of the right foot were obtained.     COMPARISON: Right foot x-rays 7/3/2024.     There is moderate swelling of the right great toe, this appears  increased, no soft tissue gas is identified. There is previous osteotomy  and arthrodesis at the interphalangeal joint of the right great toe with  a single compression screw which appears to be in satisfactory position.  Compared with the recent x-rays there is slight widening of the  osteotomy space. No freddie osseous destruction is identified. Moderate  overgrowth of the first metatarsal head as before. The other joint  spaces are preserved. No acute fracture.       Impression:      1. Increased soft tissue swelling of the right great toe, correlate  clinically for soft tissue infection. No obvious bony destruction is  identified, postsurgical changes as described, with slight widening of  the osteotomy at the interphalangeal joint level. No significant bony  bridging associated with the arthrodesis at this time.     This report was signed and finalized on 7/27/2024 12:39 PM by Dr. Cyril Reyna MD.             LAB RESULTS:      Lab 07/29/24  0433 07/27/24  1638 07/27/24  1230   WBC 5.17  --  5.19   HEMOGLOBIN 13.0  --  14.6   HEMATOCRIT 39.6  --  43.9   PLATELETS 93*  --  111*   NEUTROS ABS 3.32  --  2.89   IMMATURE GRANS (ABS) 0.01  --  0.02   LYMPHS ABS 1.21  --  1.64   MONOS ABS 0.43  --  0.44   EOS ABS 0.18  --  0.17   MCV 90.6  --  90.0   CRP  --   --  0.33   PROCALCITONIN  --   --  0.04   LACTATE  --  1.4 2.3*         Lab 07/30/24  0633 07/29/24  0433 07/28/24  0520 07/27/24  1230   SODIUM 137 136 138 137    POTASSIUM 4.3 4.5 4.5 4.4   CHLORIDE 104 103 105 104   CO2 20.0* 22.0 20.0* 19.0*   ANION GAP 13.0 11.0 13.0 14.0   BUN 18 21 21 23   CREATININE 0.93 1.07 1.09 1.34*   EGFR 90.6 76.5 74.9 58.4*   GLUCOSE 286* 225* 184* 243*   CALCIUM 8.8 9.3 9.3 9.5   MAGNESIUM  --   --  1.9  --    HEMOGLOBIN A1C  --   --  7.00*  --          Lab 07/27/24  1230   TOTAL PROTEIN 7.3   ALBUMIN 4.2   GLOBULIN 3.1   ALT (SGPT) 19   AST (SGOT) 15   BILIRUBIN 0.9   ALK PHOS 86                     Brief Urine Lab Results  (Last result in the past 365 days)        Color   Clarity   Blood   Leuk Est   Nitrite   Protein   CREAT   Urine HCG        06/06/24 0930 Yellow   Clear   Negative   Negative   Negative   Negative                 Microbiology Results (last 10 days)       Procedure Component Value - Date/Time    MRSA Screen, PCR (Inpatient) - Swab, Nares [[id number]]  (Normal) Collected: 07/27/24 2055    Lab Status: Final result Specimen: Swab from Nares Updated: 07/27/24 2211     MRSA PCR No MRSA Detected    Narrative:      The negative predictive value of this diagnostic test is high and should only be used to consider de-escalating anti-MRSA therapy. A positive result may indicate colonization with MRSA and must be correlated clinically.    Blood Culture - Blood, Hand, Right [[id number]]  (Normal) Collected: 07/27/24 1832    Lab Status: Preliminary result Specimen: Blood from Hand, Right Updated: 07/29/24 1900     Blood Culture No growth at 2 days    Blood Culture - Blood, Arm, Left [[id number]]  (Normal) Collected: 07/27/24 1339    Lab Status: Preliminary result Specimen: Blood from Arm, Left Updated: 07/30/24 1401     Blood Culture No growth at 3 days    Blood Culture - Blood, Arm, Left [[id number]]  (Normal) Collected: 07/27/24 1230    Lab Status: Preliminary result Specimen: Blood from Arm, Left Updated: 07/30/24 1315     Blood Culture No growth at 3 days            Hospital Course: Mr. Boyer presented to [facility]  emergency room 7/27/2024 after awakening the morning of admission with pain, swelling, redness right great toe.  He has a history of ulcer on the plantar surface of right big toe for 5 years.  Dr. Villa recently took him to surgery on 7/3/2024 for hallux interphalangeal joint arthrodesis with bone graft.  Patient reported wound healing and he was seen at wound care on 7/25 for dressing change.  No drainage or erythema noted at that time. He has a history of psoriatic arthritis, skin cancer, asthma asthma, bilateral carotid stenosis, chronic diastolic heart failure, coronary artery disease, diabetes, paroxysmal atrial fibrillation, myocardial infarction, stroke, seizure disorder.  Patient reported awakening with swelling, redness, pain right great toe and he was concerned for bone infection.  Lactate 2.3, creatinine 1.34, WBC 5.19.  X-ray right foot showed increased soft tissue swelling right great toe correlate for soft tissue infection.  No obvious bony destruction identified.  Postsurgical changes with slight widening of the osteotomy interphalangeal joint level.  No significant bony bridging associated with arthrodesis.  Vancomycin, morphine, Zofran given in ER.    He was admitted to the medical floor with cellulitis right great toe.  He awoke7/27 with pain, swelling, erythema right great toe.  Status post right hallux interphalangeal joint arthrodesis with bone graft 7/3 Dr. Villa.  Patient followed in wound care clinic on 7/25 without drainage or erythema.  X-ray right foot shows soft tissue swelling right great toe.  MRI right foot 7/28 notes diffuse edema throughout proximal and distal phalanx of great toe.  Patient had recent surgery 7/3.  Edema could be postop or due to osteomyelitis.  Cefepime and vancomycin ordered on admission.  Patient remains afebrile, white blood cell count normal.  Blood cultures no growth.  Dr. Villa, podiatry consulted but not available till 7/30/2024.  Patient remained on IV  "antibiotics and Dr. Villa saw patient on 7/30/2024 and felt as though this was normal surgical changes and no evidence of osteomyelitis.  Dr. Villa recommended cam boot, doxycycline 100 mg orally twice daily and follow-up with Dr. Villa in wound clinic on 8/1/2024.  He was cleared for discharge by Dr. Villa.    Patient has a history of diabetes mellitus type 2 with insulin.  Hemoglobin A1c 7 on 7/20/2024.  Accu-Cheks with sliding scale insulin coverage ordered.  Glucoses 195, 286, 209.  Metformin held until patient was evaluated for Dr. Villa case patient would need surgery.  Metformin resumed on 7/30.  Jardiance continued.  Patient uses Humalog 3 times daily at home.  He also takes glargine 80 units in the morning and 100 units at night and will resume home medication regimen at discharge.    Metoprolol continued for primary hypertension.  Blood pressure 148/91.    Chronic diastolic heart failure with preserved ejection fraction.  EF 61-65% per echo 4/18/2024.  No exacerbation of symptoms.  Aspirin, statin, beta-blocker, Entresto, Jardiance continued.    Patient on chronic anticoagulation for history of atrial flutter maintaining normal sinus rhythm.  Eliquis continued and served as DVT prophylaxis.    On 7/30/2024, he has been cleared for discharge by Dr. Villa.  After evaluating right great toe, Dr. Villa believes patient with normal postop surgical changes and not related osteomyelitis.  Doxycycline 100 mg twice daily for 10 days recommended by Dr. Villa.  Cam boot right foot.  He does have an appointment to see Dr. Villa in wound care clinic on 8/1/2024.  He will follow-up with [facility].     Physical Exam on Discharge:  /91 (BP Location: Left arm, Patient Position: Sitting)   Pulse 64   Temp 97.9 °F (36.6 °C) (Oral)   Resp 16   Ht 181 cm (71.26\")   Wt 118 kg (260 lb 2.3 oz)   SpO2 97%   BMI 36.02 kg/m²   Physical Exam  Vitals and nursing note reviewed. "   Constitutional:       Appearance: He is obese.      Comments: Sitting up on side of bed.  No oxygen use.   HENT:      Head: Normocephalic and atraumatic.      Nose: No congestion.      Mouth/Throat:      Pharynx: Oropharynx is clear. No oropharyngeal exudate or posterior oropharyngeal erythema.   Eyes:      Extraocular Movements: Extraocular movements intact.      Pupils: Pupils are equal, round, and reactive to light.   Cardiovascular:      Rate and Rhythm: Normal rate and regular rhythm.      Heart sounds: No murmur heard.  Pulmonary:      Breath sounds: No wheezing, rhonchi or rales.      Comments: No oxygen in use.  Abdominal:      General: There is no distension.      Palpations: Abdomen is soft.      Tenderness: There is no abdominal tenderness.   Genitourinary:     Comments: Voiding.  Musculoskeletal:         General: Swelling (Right great toe) and tenderness (Right great toe) present.      Cervical back: Normal range of motion and neck supple.   Skin:     General: Skin is warm and dry.      Findings: Erythema (No erythema right great toe) present.   Neurological:      General: No focal deficit present.      Mental Status: He is alert and oriented to person, place, and time.   Psychiatric:         Mood and Affect: Mood normal.         Behavior: Behavior normal.         Thought Content: Thought content normal.         Judgment: Judgment normal.         Condition on Discharge: Stablel for discharge home    Discharge Disposition:  Home or Self Care    Discharge Medications:     Discharge Medications        New Medications        Instructions Start Date   doxycycline 100 MG capsule  Commonly known as: VIBRAMYCIN   100 mg, Oral, 2 Times Daily             Continue These Medications        Instructions Start Date   acetaminophen 325 MG tablet  Commonly known as: TYLENOL   325 mg, Oral, Every 6 Hours PRN      albuterol sulfate  (90 Base) MCG/ACT inhaler  Commonly known as: PROVENTIL HFA;VENTOLIN HFA;PROAIR  HFA   2 puffs, Inhalation, Every 6 Hours PRN      apixaban 5 MG tablet tablet  Commonly known as: ELIQUIS   5 mg, Oral, 2 Times Daily      Aspirin 81 MG capsule   81 mg, Oral, Daily      carboxymethylcellulose 0.5 % solution  Commonly known as: REFRESH PLUS   1 drop, Both Eyes, 4 Times Daily PRN      docusate sodium 100 MG capsule  Commonly known as: COLACE   100 mg, Oral, Daily PRN      empagliflozin 25 MG tablet tablet  Commonly known as: JARDIANCE   25 mg, Oral, Daily      Entresto  MG tablet  Generic drug: sacubitril-valsartan   1 tablet, Oral, 2 Times Daily      ezetimibe 10 MG tablet  Commonly known as: ZETIA   10 mg, Oral, Daily      fluticasone 50 MCG/ACT nasal spray  Commonly known as: FLONASE   2 sprays, Nasal, Daily      furosemide 40 MG tablet  Commonly known as: Lasix   40 mg, Oral, Daily PRN      gabapentin 300 MG capsule  Commonly known as: NEURONTIN   300 mg, Oral, Nightly      guaiFENesin 600 MG 12 hr tablet  Commonly known as: MUCINEX   1,200 mg, Oral, Daily PRN      insulin aspart 100 UNIT/ML solution pen-injector sc pen  Commonly known as: novoLOG FLEXPEN   50 Units, Subcutaneous, 3 Times Daily With Meals, Can use 5 to 6 more units if needed in early evening.      INSULIN GLARGINE-YFGN SC   100 Units, Subcutaneous, 2 Times Daily      isosorbide mononitrate 120 MG 24 hr tablet  Commonly known as: IMDUR   120 mg, Oral, Daily      lamoTRIgine 200 MG tablet  Commonly known as: LaMICtal   200 mg, Oral, 2 Times Daily      levETIRAcetam 500 MG tablet  Commonly known as: KEPPRA   1,500 mg, Oral, Every Morning      levETIRAcetam 500 MG tablet  Commonly known as: KEPPRA   2,000 mg, Oral, Nightly      metFORMIN 1000 MG tablet  Commonly known as: GLUCOPHAGE   1,000 mg, Oral, 2 Times Daily With Meals      metoprolol tartrate 100 MG tablet  Commonly known as: LOPRESSOR   100 mg, Oral, 2 Times Daily      multivitamin tablet tablet  Commonly known as: THERAGRAN   1 tablet, Oral, Daily      nitroglycerin  0.4 MG SL tablet  Commonly known as: NITROSTAT   0.4 mg, Sublingual, Every 5 Minutes PRN, Take no more than 3 doses in 15 minutes.      Nurtec 75 MG dispersible tablet  Generic drug: Rimegepant Sulfate   1 tablet, Oral, 3 Times Daily PRN      Ozempic (0.25 or 0.5 MG/DOSE) 2 MG/1.5ML solution pen-injector  Generic drug: Semaglutide(0.25 or 0.5MG/DOS)   0.5 mg, Subcutaneous, Weekly, Monday      pantoprazole 40 MG EC tablet  Commonly known as: PROTONIX   40 mg, Oral, 2 Times Daily      polycarbophil 625 MG tablet tablet   625 mg, Oral, Daily      polyethylene glycol 17 g packet  Commonly known as: MIRALAX   17 g, Oral, Daily      psyllium 58.6 % packet  Commonly known as: METAMUCIL   1 packet, Oral, Daily PRN      rosuvastatin 40 MG tablet  Commonly known as: CRESTOR   20 mg, Oral, Nightly      tamsulosin 0.4 MG capsule 24 hr capsule  Commonly known as: FLOMAX   1 capsule, Oral, Daily      triamcinolone 0.1 % ointment  Commonly known as: KENALOG   1 Application, Topical, 2 Times Daily, To feet               Discharge Diet:   Diet Instructions       Diet: Diabetic Diets; Consistent Carbohydrate; Regular (IDDSI 7); Thin (IDDSI 0)      Discharge Diet: Diabetic Diets    Diabetic Diet: Consistent Carbohydrate    Texture: Regular (IDDSI 7)    Fluid Consistency: Thin (IDDSI 0)            Activity at Discharge:   Activity Instructions       Other Activity Instructions      Activity Instructions: Cam boot right foot            Discharge instructions:  1.  For worsening pain, swelling, erythema right great toe seek medical attention.  2.  Doxycycline 1 mg twice daily for 10 days beginning 7/30/2024 per Dr. Villa  3.  CAM Boot right foot  4.  Follow-up with Dr. Villa on 8/1/2024 at 8 AM wound care  5.  Follow-up with Vinayak Correia PA-C on 8/7/2024  6.  Avoid sun exposure while taking doxycycline.        Follow-up Appointments:   Future Appointments   Date Time Provider Department Center   10/17/2024 10:15 AM Jenise  TEENA Contreras [facility] CD  [facility]   2/19/2025 10:00 AM Steven Tang MD [facility] CTS  [facility] P[facility]       Test Results Pending at Discharge: None    Electronically signed by TEENA Rowe, 07/30/24, 15:41 CDT.    Time: 35 minutes.  Discussed with Dr. Forman, Dr. Villa podiatry, patient and wife.    The above documentation resulted from a face-to-face encounter by me Meghan DICKINSON, [facility].           Electronically signed by Jay Forman DO at 07/30/24 1631       Discharge Order (From admission, onward)       Start     Ordered    07/30/24 1520  Discharge patient  Once        Expected Discharge Date: 07/30/24   Discharge Disposition: Home or Self Care   Physician of Record for Attribution - Please select from Treatment Team: JAY FORMAN [[id number]]   Review needed by CMO to determine Physician of Record: No      Question Answer Comment   Physician of Record for Attribution - Please select from Treatment Team JAY FORMAN    Review needed by CMO to determine Physician of Record No        07/30/24 1525

## 2024-08-01 ENCOUNTER — OFFICE VISIT (OUTPATIENT)
Dept: WOUND CARE | Facility: HOSPITAL | Age: 64
End: 2024-08-01
Payer: OTHER GOVERNMENT

## 2024-08-01 DIAGNOSIS — Z48.89 ENCOUNTER FOR OTHER SPECIFIED SURGICAL AFTERCARE: ICD-10-CM

## 2024-08-01 DIAGNOSIS — E11.621 TYPE 2 DIABETES MELLITUS WITH FOOT ULCER, UNSPECIFIED WHETHER LONG TERM INSULIN USE: Primary | ICD-10-CM

## 2024-08-01 DIAGNOSIS — L97.509 TYPE 2 DIABETES MELLITUS WITH FOOT ULCER, UNSPECIFIED WHETHER LONG TERM INSULIN USE: Primary | ICD-10-CM

## 2024-08-01 DIAGNOSIS — M20.5X1 OTHER DEFORMITIES OF TOE(S) (ACQUIRED), RIGHT FOOT: ICD-10-CM

## 2024-08-01 LAB
BACTERIA SPEC AEROBE CULT: NORMAL

## 2024-08-01 PROCEDURE — G0463 HOSPITAL OUTPT CLINIC VISIT: HCPCS

## 2024-08-02 ENCOUNTER — READMISSION MANAGEMENT (OUTPATIENT)
Dept: CALL CENTER | Facility: HOSPITAL | Age: 64
End: 2024-08-02
Payer: OTHER GOVERNMENT

## 2024-08-02 NOTE — OUTREACH NOTE
Medical Week 1 Survey      Flowsheet Row Responses   [facility] patient discharged from? [facility]   Does the patient have one of the following disease processes/diagnoses(primary or secondary)? Other   Week 1 attempt successful? Yes   Call start time 1710   Call end time 1715   Discharge diagnosis Cellulitis of right toe   Meds reviewed with patient/caregiver? Yes   Is the patient having any side effects they believe may be caused by any medication additions or changes? Yes   Side effects comments  N/V today. RN educated pt on eating prior to taking oral antibiotics, pt v/u   Does the patient have all medications ordered at discharge? Yes   Is the patient taking all medications as directed (includes completed medication regime)? Yes   Does the patient have a primary care provider?  Yes   Does the patient have an appointment with their PCP within 7 days of discharge? Yes   Has the patient kept scheduled appointments due by today? Yes   Comments Pt can do partial wt bearing on R. foot in his Cam boot, per pt's report. Pt reports that the swelling, redness and pain are improving. Pt denies fever, chills or other issues at this time.   Did the patient receive a copy of their discharge instructions? Yes   Nursing interventions Reviewed instructions with patient   What is the patient's perception of their health status since discharge? Improving   Is the patient/caregiver able to teach back signs and symptoms related to disease process for when to call PCP? Yes   Is the patient/caregiver able to teach back signs and symptoms related to disease process for when to call 911? Yes   Is the patient/caregiver able to teach back the hierarchy of who to call/visit for symptoms/problems? PCP, Specialist, Home health nurse, Urgent Care, ED, 911 Yes   Week 1 call completed? Yes   Revoked No further contact(revokes)-requires comment   Call end time 1715            Melinda EDUARDO - Registered Nurse   Hypercholesterolemia

## 2024-08-02 NOTE — NURSING NOTE
Dr. Ibarra at  checking on patient. No orders received.  
Pt with bloody drainage upon arrival. CRNA attempted to place dressing to nose and patient blew nose on dressing and large blood clots noted coming from nose. Will closely monitor.  
Abnormal as indicated, otherwise normal...

## 2024-08-19 ENCOUNTER — TELEPHONE (OUTPATIENT)
Dept: NEUROLOGY | Facility: CLINIC | Age: 64
End: 2024-08-19
Payer: OTHER GOVERNMENT

## 2024-08-19 DIAGNOSIS — G40.909 SEIZURE DISORDER: ICD-10-CM

## 2024-08-19 NOTE — TELEPHONE ENCOUNTER
Caller: Carlos A Boyer Jr.    Relationship: Self    Best call back number: [phone]     What is the best time to reach you:     Who are you requesting to speak with (clinical staff, provider,  specific staff member): KYE BELL PT NEEDS TO BE SCHEDULED    Do you know the name of the person who called:     What was the call regarding: WHICH PROVIDER SHOULD PT BE SCHEDULED WITH FOR SEIZURES AND MIGRAINES?    PLEASE CALL PT BACK TO SCHEDULE APPT.

## 2024-08-19 NOTE — TELEPHONE ENCOUNTER
Caller: Carlos A Boyer Jr.    Relationship: Self    Best call back number: [phone]    Requested Prescriptions:   Requested Prescriptions     Pending Prescriptions Disp Refills    lamoTRIgine (LaMICtal) 200 MG tablet 180 tablet 1     Sig: Take 1 tablet by mouth 2 (Two) Times a Day.        Pharmacy where request should be sent: [facility] - [address] [phone] [facility] [phone] FX     Last office visit with prescribing clinician: 8/22/2023   Last telemedicine visit with prescribing clinician: Visit date not found   Next office visit with prescribing clinician: Visit date not found     Additional details provided by patient: PT NEEDS A NEW SCRIPT. PT ONLY HAS FOUR DAYS OF RX AND NEEDS THIS ASAP DUE TO RX BEING MAILED.    Does the patient have less than a 3 day supply:  [] Yes  [x] No    Would you like a call back once the refill request has been completed: [] Yes [] No    If the office needs to give you a call back, can they leave a voicemail: [] Yes [] No    Tyrone Bertrand Rep   08/19/24 13:57 CDT

## 2024-08-20 RX ORDER — LAMOTRIGINE 200 MG/1
200 TABLET ORAL 2 TIMES DAILY
Qty: 180 TABLET | Refills: 1 | Status: SHIPPED | OUTPATIENT
Start: 2024-08-20

## 2024-08-20 NOTE — TELEPHONE ENCOUNTER
CALLED PATIENT BACK AND I LET THEM KNOW AT THIS TIME THAT WE DO NOT HAVE ANOTHER PHYSICIAN TAKING OVER JUSTICE PATIENTS AT THIS TIME. I LET THEM KNOW IF THEY HAVE ANY CONCERNS THEY ARE MORE THAN WELCOME TO CALL OUR OFFICE BUT IF THEY FEEL THEY NEED TO BE SEEN THEY WILL MORE THAN LIKELY NEED TO CONTACT PCP TO BE SEEN OR REFERRED ELSEWHERE.  THEY VOICED UNDERSTANDING.

## 2024-08-21 ENCOUNTER — TELEPHONE (OUTPATIENT)
Dept: CARDIOLOGY | Facility: CLINIC | Age: 64
End: 2024-08-21
Payer: OTHER GOVERNMENT

## 2024-08-21 NOTE — TELEPHONE ENCOUNTER
Patient has not transmitted CardioMEMS data since 11.26.2023.  Reading reminder sent to patient.

## 2024-09-18 ENCOUNTER — HOSPITAL ENCOUNTER (EMERGENCY)
Facility: HOSPITAL | Age: 64
Discharge: HOME | End: 2024-09-19
Payer: OTHER GOVERNMENT

## 2024-09-18 ENCOUNTER — APPOINTMENT (OUTPATIENT)
Dept: CT IMAGING | Facility: HOSPITAL | Age: 64
End: 2024-09-18
Payer: OTHER GOVERNMENT

## 2024-09-18 DIAGNOSIS — R10.32 LEFT LOWER QUADRANT ABDOMINAL PAIN: Primary | ICD-10-CM

## 2024-09-18 DIAGNOSIS — R11.2 NAUSEA AND VOMITING, UNSPECIFIED VOMITING TYPE: ICD-10-CM

## 2024-09-18 DIAGNOSIS — D69.6 THROMBOCYTOPENIA: ICD-10-CM

## 2024-09-18 LAB
ALBUMIN SERPL-MCNC: 4.6 G/DL (ref 3.5–5.2)
ALBUMIN/GLOB SERPL: 1.5 G/DL
ALP SERPL-CCNC: 90 U/L (ref 39–117)
ALT SERPL W P-5'-P-CCNC: 20 U/L (ref 1–41)
ANION GAP SERPL CALCULATED.3IONS-SCNC: 10 MMOL/L (ref 5–15)
APTT PPP: 30.5 SECONDS (ref 24.5–36)
AST SERPL-CCNC: 16 U/L (ref 1–40)
BASOPHILS # BLD AUTO: 0.03 10*3/MM3 (ref 0–0.2)
BASOPHILS NFR BLD AUTO: 0.6 % (ref 0–1.5)
BILIRUB SERPL-MCNC: 0.9 MG/DL (ref 0–1.2)
BILIRUB UR QL STRIP: NEGATIVE
BUN SERPL-MCNC: 13 MG/DL (ref 8–23)
BUN/CREAT SERPL: 14.3 (ref 7–25)
CALCIUM SPEC-SCNC: 9.7 MG/DL (ref 8.6–10.5)
CHLORIDE SERPL-SCNC: 106 MMOL/L (ref 98–107)
CLARITY UR: CLEAR
CO2 SERPL-SCNC: 26 MMOL/L (ref 22–29)
COLOR UR: YELLOW
CREAT SERPL-MCNC: 0.91 MG/DL (ref 0.76–1.27)
D-LACTATE SERPL-SCNC: 1.2 MMOL/L (ref 0.5–2)
DEPRECATED RDW RBC AUTO: 44.9 FL (ref 37–54)
DEVELOPER EXPIRATION DATE: NORMAL
DEVELOPER LOT NUMBER: 271
EGFRCR SERPLBLD CKD-EPI 2021: 93 ML/MIN/1.73
EOSINOPHIL # BLD AUTO: 0.19 10*3/MM3 (ref 0–0.4)
EOSINOPHIL NFR BLD AUTO: 3.5 % (ref 0.3–6.2)
ERYTHROCYTE [DISTWIDTH] IN BLOOD BY AUTOMATED COUNT: 13.2 % (ref 12.3–15.4)
EXPIRATION DATE: NORMAL
FECAL OCCULT BLOOD SCREEN, POC: NEGATIVE
GLOBULIN UR ELPH-MCNC: 3 GM/DL
GLUCOSE BLDC GLUCOMTR-MCNC: 59 MG/DL (ref 70–130)
GLUCOSE BLDC GLUCOMTR-MCNC: 74 MG/DL (ref 70–130)
GLUCOSE BLDC GLUCOMTR-MCNC: 91 MG/DL (ref 70–130)
GLUCOSE SERPL-MCNC: 91 MG/DL (ref 65–99)
GLUCOSE UR STRIP-MCNC: ABNORMAL MG/DL
HCT VFR BLD AUTO: 45.6 % (ref 37.5–51)
HGB BLD-MCNC: 14.8 G/DL (ref 13–17.7)
HGB UR QL STRIP.AUTO: NEGATIVE
HOLD SPECIMEN: NORMAL
HOLD SPECIMEN: NORMAL
IMM GRANULOCYTES # BLD AUTO: 0.01 10*3/MM3 (ref 0–0.05)
IMM GRANULOCYTES NFR BLD AUTO: 0.2 % (ref 0–0.5)
INR PPP: 1 (ref 0.91–1.09)
KETONES UR QL STRIP: NEGATIVE
LEUKOCYTE ESTERASE UR QL STRIP.AUTO: NEGATIVE
LIPASE SERPL-CCNC: 13 U/L (ref 13–60)
LYMPHOCYTES # BLD AUTO: 2.29 10*3/MM3 (ref 0.7–3.1)
LYMPHOCYTES NFR BLD AUTO: 42.6 % (ref 19.6–45.3)
Lab: 271
MCH RBC QN AUTO: 30.3 PG (ref 26.6–33)
MCHC RBC AUTO-ENTMCNC: 32.5 G/DL (ref 31.5–35.7)
MCV RBC AUTO: 93.3 FL (ref 79–97)
MONOCYTES # BLD AUTO: 0.44 10*3/MM3 (ref 0.1–0.9)
MONOCYTES NFR BLD AUTO: 8.2 % (ref 5–12)
NEGATIVE CONTROL: NEGATIVE
NEUTROPHILS NFR BLD AUTO: 2.42 10*3/MM3 (ref 1.7–7)
NEUTROPHILS NFR BLD AUTO: 44.9 % (ref 42.7–76)
NITRITE UR QL STRIP: NEGATIVE
PH UR STRIP.AUTO: 5.5 [PH] (ref 5–8)
PLATELET # BLD AUTO: 129 10*3/MM3 (ref 140–450)
PMV BLD AUTO: 11 FL (ref 6–12)
POSITIVE CONTROL: POSITIVE
POTASSIUM SERPL-SCNC: 4.3 MMOL/L (ref 3.5–5.2)
PROT SERPL-MCNC: 7.6 G/DL (ref 6–8.5)
PROT UR QL STRIP: NEGATIVE
PROTHROMBIN TIME: 13.6 SECONDS (ref 11.8–14.8)
RBC # BLD AUTO: 4.89 10*6/MM3 (ref 4.14–5.8)
SODIUM SERPL-SCNC: 142 MMOL/L (ref 136–145)
SP GR UR STRIP: >=1.03 (ref 1–1.03)
UROBILINOGEN UR QL STRIP: ABNORMAL
WBC NRBC COR # BLD AUTO: 5.38 10*3/MM3 (ref 3.4–10.8)
WHOLE BLOOD HOLD COAG: NORMAL
WHOLE BLOOD HOLD SPECIMEN: NORMAL

## 2024-09-18 PROCEDURE — 96374 THER/PROPH/DIAG INJ IV PUSH: CPT

## 2024-09-18 PROCEDURE — 25810000003 SODIUM CHLORIDE 0.9 % SOLUTION: Performed by: NURSE PRACTITIONER

## 2024-09-18 PROCEDURE — 74177 CT ABD & PELVIS W/CONTRAST: CPT

## 2024-09-18 PROCEDURE — 85610 PROTHROMBIN TIME: CPT | Performed by: NURSE PRACTITIONER

## 2024-09-18 PROCEDURE — 85025 COMPLETE CBC W/AUTO DIFF WBC: CPT

## 2024-09-18 PROCEDURE — 82948 REAGENT STRIP/BLOOD GLUCOSE: CPT

## 2024-09-18 PROCEDURE — 83690 ASSAY OF LIPASE: CPT

## 2024-09-18 PROCEDURE — 96375 TX/PRO/DX INJ NEW DRUG ADDON: CPT

## 2024-09-18 PROCEDURE — 81003 URINALYSIS AUTO W/O SCOPE: CPT

## 2024-09-18 PROCEDURE — 25010000002 MORPHINE PER 10 MG: Performed by: NURSE PRACTITIONER

## 2024-09-18 PROCEDURE — 83605 ASSAY OF LACTIC ACID: CPT

## 2024-09-18 PROCEDURE — 82270 OCCULT BLOOD FECES: CPT | Performed by: NURSE PRACTITIONER

## 2024-09-18 PROCEDURE — 80053 COMPREHEN METABOLIC PANEL: CPT

## 2024-09-18 PROCEDURE — 99285 EMERGENCY DEPT VISIT HI MDM: CPT

## 2024-09-18 PROCEDURE — 85730 THROMBOPLASTIN TIME PARTIAL: CPT | Performed by: NURSE PRACTITIONER

## 2024-09-18 PROCEDURE — 25010000002 ONDANSETRON PER 1 MG: Performed by: NURSE PRACTITIONER

## 2024-09-18 PROCEDURE — 25510000001 IOPAMIDOL 61 % SOLUTION: Performed by: NURSE PRACTITIONER

## 2024-09-18 RX ORDER — IOPAMIDOL 612 MG/ML
100 INJECTION, SOLUTION INTRAVASCULAR
Status: COMPLETED | OUTPATIENT
Start: 2024-09-18 | End: 2024-09-18

## 2024-09-18 RX ORDER — SODIUM CHLORIDE 0.9 % (FLUSH) 0.9 %
10 SYRINGE (ML) INJECTION AS NEEDED
Status: DISCONTINUED | OUTPATIENT
Start: 2024-09-18 | End: 2024-09-19 | Stop reason: HOSPADM

## 2024-09-18 RX ORDER — ONDANSETRON 2 MG/ML
4 INJECTION INTRAMUSCULAR; INTRAVENOUS ONCE
Status: COMPLETED | OUTPATIENT
Start: 2024-09-18 | End: 2024-09-18

## 2024-09-18 RX ADMIN — IOPAMIDOL 100 ML: 612 INJECTION, SOLUTION INTRAVENOUS at 21:03

## 2024-09-18 RX ADMIN — SODIUM CHLORIDE 1000 ML: 9 INJECTION, SOLUTION INTRAVENOUS at 20:37

## 2024-09-18 RX ADMIN — ONDANSETRON 4 MG: 2 INJECTION INTRAMUSCULAR; INTRAVENOUS at 20:37

## 2024-09-18 RX ADMIN — MORPHINE SULFATE 4 MG: 4 INJECTION, SOLUTION INTRAMUSCULAR; INTRAVENOUS at 20:37

## 2024-09-19 VITALS
OXYGEN SATURATION: 97 % | TEMPERATURE: 97.8 F | BODY MASS INDEX: 32.91 KG/M2 | DIASTOLIC BLOOD PRESSURE: 76 MMHG | RESPIRATION RATE: 18 BRPM | WEIGHT: 243 LBS | SYSTOLIC BLOOD PRESSURE: 126 MMHG | HEART RATE: 64 BPM | HEIGHT: 72 IN

## 2024-09-19 RX ORDER — ONDANSETRON 4 MG/1
4 TABLET, ORALLY DISINTEGRATING ORAL EVERY 6 HOURS PRN
Qty: 12 TABLET | Refills: 0 | Status: SHIPPED | OUTPATIENT
Start: 2024-09-19

## 2024-10-17 ENCOUNTER — OFFICE VISIT (OUTPATIENT)
Dept: CARDIOLOGY | Facility: CLINIC | Age: 64
End: 2024-10-17
Payer: OTHER GOVERNMENT

## 2024-10-17 VITALS
DIASTOLIC BLOOD PRESSURE: 79 MMHG | WEIGHT: 251 LBS | HEIGHT: 72 IN | SYSTOLIC BLOOD PRESSURE: 145 MMHG | BODY MASS INDEX: 34 KG/M2 | HEART RATE: 55 BPM

## 2024-10-17 DIAGNOSIS — I50.32 CHRONIC DIASTOLIC CONGESTIVE HEART FAILURE: Primary | ICD-10-CM

## 2024-10-17 DIAGNOSIS — I10 PRIMARY HYPERTENSION: ICD-10-CM

## 2024-10-17 DIAGNOSIS — E66.09 CLASS 1 OBESITY DUE TO EXCESS CALORIES WITH SERIOUS COMORBIDITY AND BODY MASS INDEX (BMI) OF 34.0 TO 34.9 IN ADULT: ICD-10-CM

## 2024-10-17 DIAGNOSIS — E78.5 HYPERLIPIDEMIA LDL GOAL <70: ICD-10-CM

## 2024-10-17 DIAGNOSIS — Z79.4 TYPE 2 DIABETES MELLITUS WITH OTHER CIRCULATORY COMPLICATION, WITH LONG-TERM CURRENT USE OF INSULIN: ICD-10-CM

## 2024-10-17 DIAGNOSIS — G47.33 OSA ON CPAP: ICD-10-CM

## 2024-10-17 DIAGNOSIS — I65.23 BILATERAL CAROTID ARTERY STENOSIS: ICD-10-CM

## 2024-10-17 DIAGNOSIS — I71.21 ANEURYSM OF ASCENDING AORTA WITHOUT RUPTURE: ICD-10-CM

## 2024-10-17 DIAGNOSIS — E11.59 TYPE 2 DIABETES MELLITUS WITH OTHER CIRCULATORY COMPLICATION, WITH LONG-TERM CURRENT USE OF INSULIN: ICD-10-CM

## 2024-10-17 DIAGNOSIS — E66.811 CLASS 1 OBESITY DUE TO EXCESS CALORIES WITH SERIOUS COMORBIDITY AND BODY MASS INDEX (BMI) OF 34.0 TO 34.9 IN ADULT: ICD-10-CM

## 2024-10-17 DIAGNOSIS — I48.0 PAROXYSMAL ATRIAL FIBRILLATION: ICD-10-CM

## 2024-10-17 DIAGNOSIS — Z79.01 CHRONIC ANTICOAGULATION: ICD-10-CM

## 2024-10-17 DIAGNOSIS — I25.10 CORONARY ARTERY DISEASE INVOLVING NATIVE CORONARY ARTERY OF NATIVE HEART WITHOUT ANGINA PECTORIS: ICD-10-CM

## 2024-10-17 RX ORDER — FUROSEMIDE 40 MG
40 TABLET ORAL DAILY PRN
Qty: 90 TABLET | Refills: 3 | Status: SHIPPED | OUTPATIENT
Start: 2024-10-17

## 2024-10-17 NOTE — PROGRESS NOTES
Subjective:     Encounter Date: 10/17/2024      Patient ID: Carlos A Boyer Jr. is a 64 y.o. male with chronic diastolic congestive heart failure s/p CardioMEMs, coronary artery disease status post coronary artery stenting and CABG x2 (LIMA to LAD and SVG to OM) 7/6/2019, paroxysmal atrial fibrillation status post cardioversion on chronic anticoagulation, hypertension, hyperlipidemia, type 2 diabetes mellitus, peripheral vascular disease, cerebrovascular accident, obstructive sleep apnea and obesity    Chief Complaint: routine follow up  History of Present Illness  Patient presents today for management of congestive heart failure and coronary artery disease. Today he reports that he has been feeling ok. He reports that he has some shortness of breath recently but feels it is more related to weather and harvesting season with all the dust. He denies any leg swelling, orthopnea or PND. He denies any weight changes. He continues to have chronic chest pain that is relieved with his NTG. He denies any heart racing or palpitations. He denies any dizziness or near syncope. He reports that his BP has remained well controlled and running 120-140/70-80. He has lasix to take as needed and reports seldomly using it. He was recently in the ER with some abdominal pain and diagnosed with diverticulitis. He states that he has had some blood in his stool and they are watching it closely to make sure it resolves with diverticulitis. He takes eliquis and denies any other bleeding issues. Patient follows with Vinayak Correia as PCP.     The following portions of the patient's history were reviewed and updated as appropriate: allergies, current medications, past family history, past medical history, past social history, past surgical history and problem list.    Allergies   Allergen Reactions    Flagyl [Metronidazole] Hives    Atorvastatin Other (See Comments) and Myalgia      - LIPITOR -   Muscle cramps    Ciprofloxacin Hives      -  CIPRO -        Current Outpatient Medications:     acetaminophen (TYLENOL) 325 MG tablet, Take 1 tablet by mouth Every 6 (Six) Hours As Needed for Mild Pain., Disp: , Rfl:     albuterol (PROVENTIL HFA;VENTOLIN HFA) 108 (90 BASE) MCG/ACT inhaler, Inhale 2 puffs Every 6 (Six) Hours As Needed for Wheezing., Disp: , Rfl:     apixaban (ELIQUIS) 5 MG tablet tablet, Take 1 tablet by mouth 2 (Two) Times a Day., Disp: , Rfl:     Aspirin 81 MG capsule, Take 81 mg by mouth Daily., Disp: , Rfl:     calcium polycarbophil (FIBERCON) 625 MG tablet, Take 1 tablet by mouth Daily., Disp: , Rfl:     carboxymethylcellulose (REFRESH PLUS) 0.5 % solution, Administer 1 drop to both eyes 4 (Four) Times a Day As Needed for Dry Eyes., Disp: , Rfl:     docusate sodium (COLACE) 100 MG capsule, Take 1 capsule by mouth Daily As Needed for Constipation., Disp: , Rfl:     empagliflozin (JARDIANCE) 25 MG tablet tablet, Take 1 tablet by mouth Daily., Disp: , Rfl:     ezetimibe (ZETIA) 10 MG tablet, Take 1 tablet by mouth Daily., Disp: , Rfl:     fluticasone (FLONASE) 50 MCG/ACT nasal spray, Administer 2 sprays into the nostril(s) as directed by provider Daily., Disp: , Rfl:     furosemide (Lasix) 40 MG tablet, Take 1 tablet by mouth Daily As Needed (take as needed for weight gain more than 2 pounds in a day or more than than 3 pounds in 2 days.)., Disp: 90 tablet, Rfl: 3    gabapentin (NEURONTIN) 300 MG capsule, Take 1 capsule by mouth Every Night., Disp: , Rfl:     guaiFENesin (MUCINEX) 600 MG 12 hr tablet, Take 2 tablets by mouth Daily As Needed for Congestion., Disp: , Rfl:     insulin aspart (novoLOG FLEXPEN) 100 UNIT/ML solution pen-injector sc pen, Inject 50 Units under the skin into the appropriate area as directed 3 (Three) Times a Day With Meals. Can use 5 to 6 more units if needed in early evening., Disp: , Rfl:     INSULIN GLARGINE-YFGN SC, Inject 100 Units under the skin into the appropriate area as directed 2 (Two) Times a Day., Disp: ,  Rfl:     isosorbide mononitrate (IMDUR) 120 MG 24 hr tablet, Take 1 tablet by mouth Daily., Disp: 90 tablet, Rfl: 3    lamoTRIgine (LaMICtal) 200 MG tablet, Take 1 tablet by mouth 2 (Two) Times a Day., Disp: 180 tablet, Rfl: 1    levETIRAcetam (KEPPRA) 500 MG tablet, Take 3 tablets by mouth Every Morning., Disp: 90 tablet, Rfl: 5    levETIRAcetam (KEPPRA) 500 MG tablet, Take 4 tablets by mouth Every Night., Disp: 120 tablet, Rfl: 5    metFORMIN (GLUCOPHAGE) 1000 MG tablet, Take 1 tablet by mouth 2 (Two) Times a Day With Meals., Disp: , Rfl:     metoprolol tartrate (LOPRESSOR) 100 MG tablet, Take 1 tablet by mouth 2 (Two) Times a Day., Disp: , Rfl:     Multiple Vitamin (MULTI VITAMIN PO), Take 1 tablet by mouth Daily., Disp: , Rfl:     nitroglycerin (NITROSTAT) 0.4 MG SL tablet, Place 1 tablet under the tongue Every 5 (Five) Minutes As Needed for Chest Pain. Take no more than 3 doses in 15 minutes., Disp: 25 tablet, Rfl: 2    ondansetron ODT (ZOFRAN-ODT) 4 MG disintegrating tablet, Place 1 tablet on the tongue Every 6 (Six) Hours As Needed for Nausea or Vomiting for up to 12 doses., Disp: 12 tablet, Rfl: 0    pantoprazole (PROTONIX) 40 MG EC tablet, Take 1 tablet by mouth 2 (Two) Times a Day., Disp: , Rfl:     polyethylene glycol (MIRALAX) 17 g packet, Take 17 g by mouth Daily., Disp: , Rfl:     psyllium (METAMUCIL) 58.6 % packet, Take 1 packet by mouth Daily As Needed (constipation)., Disp: , Rfl:     Rimegepant Sulfate (Nurtec) 75 MG tablet dispersible tablet, Take 1 tablet by mouth 3 (Three) Times a Day As Needed (migraine)., Disp: , Rfl:     rosuvastatin (CRESTOR) 40 MG tablet, Take 0.5 tablets by mouth Every Night., Disp: , Rfl:     sacubitril-valsartan (Entresto)  MG tablet, Take 1 tablet by mouth 2 (Two) Times a Day., Disp: , Rfl:     Semaglutide,0.25 or 0.5MG/DOS, (Ozempic, 0.25 or 0.5 MG/DOSE,) 2 MG/1.5ML solution pen-injector, Inject 0.5 mg under the skin into the appropriate area as directed 1 (One)  Time Per Week. Monday, Disp: , Rfl:     tamsulosin (FLOMAX) 0.4 MG capsule 24 hr capsule, Take 1 capsule by mouth Daily., Disp: , Rfl:     triamcinolone (KENALOG) 0.1 % ointment, Apply 1 Application topically to the appropriate area as directed 2 (Two) Times a Day. To feet, Disp: , Rfl:   Past Medical History:   Diagnosis Date    Arthritis     Asthma     Cancer     skin    Carotid disease, bilateral     Cellulitis     right foot - on antibiotics 2023    Chest pain     Chronic diastolic congestive heart failure 2019    Chronic sinusitis     Maribell bullosa     Coronary artery disease involving native coronary artery of native heart with unstable angina pectoris 2017    Deviated septum     Diabetes mellitus     Difficulty urinating     Diverticulitis     Enlarged prostate     Fatty liver     GERD (gastroesophageal reflux disease)     [place] (hard of hearing)     Does have hearing aids    Hyperlipidemia LDL goal <70 2017    Hypertrophy of nasal turbinates     Keratoderma     Kidney stone     Lung nodules     lower right lung    Migraine     Murmur, heart     Myocardial infarction     Obesity     Paroxysmal atrial fibrillation 2019    Personal history of COVID-19 2021    PONV (postoperative nausea and vomiting)     Primary hypertension 10/16/2016    Psoriasis     Seizures     Sinus congestion     Skin cancer     Sleep apnea     not using cpap    SOB (shortness of breath)     Stroke     mild weakness on right side    UTI (urinary tract infection)      Social History     Socioeconomic History    Marital status:    Tobacco Use    Smoking status: Former     Current packs/day: 0.00     Average packs/day: 1.5 packs/day for 18.0 years (27.0 ttl pk-yrs)     Types: Cigarettes, Cigars     Start date:      Quit date:      Years since quittin.8     Passive exposure: Past    Smokeless tobacco: Former     Types: Chew     Quit date:    Vaping Use    Vaping status: Never Used    Substance and Sexual Activity    Alcohol use: No     Comment: 0    Drug use: No    Sexual activity: Defer       Review of Systems   Constitutional: Negative for malaise/fatigue.   HENT:  Negative for nosebleeds.    Cardiovascular:  Negative for chest pain, dyspnea on exertion, irregular heartbeat, leg swelling, near-syncope, orthopnea, palpitations, paroxysmal nocturnal dyspnea and syncope.   Respiratory:  Negative for shortness of breath.    Hematologic/Lymphatic: Does not bruise/bleed easily.   Gastrointestinal:  Positive for abdominal pain (dx recently with diveriticulitis).   Genitourinary:  Negative for hematuria.   Neurological:  Negative for dizziness and headaches.   All other systems reviewed and are negative.         Objective:     Vitals reviewed.   Constitutional:       General: Not in acute distress.     Appearance: Normal appearance. Well-developed. Obese.   Eyes:      Pupils: Pupils are equal, round, and reactive to light.   HENT:      Head: Normocephalic and atraumatic.      Nose: Nose normal.   Neck:      Vascular: No carotid bruit.   Pulmonary:      Effort: Pulmonary effort is normal. No respiratory distress.   Cardiovascular:      Normal rate. Regular rhythm.      Murmurs: There is no murmur.   Edema:     Peripheral edema absent.   Abdominal:      General: There is no distension.      Palpations: Abdomen is soft.   Musculoskeletal: Normal range of motion.      Cervical back: Normal range of motion and neck supple. Skin:     General: Skin is warm.      Findings: No erythema or rash.   Neurological:      General: No focal deficit present.      Mental Status: Alert and oriented to person, place, and time.   Psychiatric:         Attention and Perception: Attention normal.         Mood and Affect: Mood normal.         Speech: Speech normal.         Behavior: Behavior normal.         Thought Content: Thought content normal.         Judgment: Judgment normal.         /79   Pulse 55   Ht 182.9  "cm (72\")   Wt 114 kg (251 lb)   BMI 34.04 kg/m²         Date/Time: 10/17/2024 10:44 AM  Performed by: Ben Burden APRN    Authorized by: Ben Burden APRN          Lab Review:       Results for orders placed during the hospital encounter of 04/17/23    Adult Transthoracic Echo Complete W/ Cont if Necessary Per Protocol    Interpretation Summary    Left ventricular systolic function is normal. Left ventricular ejection fraction appears to be 61 - 65%.    The following left ventricular wall segments are very mildly hypokinetic: apical inferior, apical septal and apex hypokinetic.    Left ventricular diastolic function is consistent with (grade II w/high LAP) pseudonormalization.    Left atrial volume is mildly increased.    Estimated right ventricular systolic pressure from tricuspid regurgitation is normal (<35 mmHg).    Normal size and function of the right ventricle.    No significant valvular pathology.    No significant change compared to prior exam from 4/29/21.      Nuclear stress 9/21/2022:   Interpretation Summary  Diaphragmatic attenuation artifact is present.  Left ventricular ejection fraction is normal. (Calculated EF = 68%).  Moderate apical and apical lateral ischemia  See bull's-eye diagram below     Holter monitor 8/15/2022:  Conclusion:   Baseline rhythm was sinus.  No significant ectopy   Single 7 beat run of supraventricular tachycardia at a maximum rate of 122 bpm at 5:04 PM      Lab Results   Component Value Date    GLUCOSE 91 09/18/2024    CALCIUM 9.7 09/18/2024     09/18/2024    K 4.3 09/18/2024    CO2 26.0 09/18/2024     09/18/2024    BUN 13 09/18/2024    CREATININE 0.91 09/18/2024    EGFRIFAFRI 27.5 (L) 08/25/2023    EGFRIFNONA 22.7 (L) 08/25/2023    BCR 14.3 09/18/2024    ANIONGAP 10.0 09/18/2024     Lab Results   Component Value Date    CHOL 97 05/21/2024    CHLPL 143 01/07/2016    TRIG 214 (H) 05/21/2024    HDL 26 (L) 05/21/2024    LDL 37 05/21/2024     I have " personally reviewed echo, nuclear stress, holter monitor, labs and past office notes prior to patients visit  Assessment:          Diagnosis Plan   1. Chronic diastolic congestive heart failure        2. Coronary artery disease involving native coronary artery of native heart without angina pectoris        3. Paroxysmal atrial fibrillation  ECG 12 Lead      4. Chronic anticoagulation        5. Primary hypertension        6. Hyperlipidemia LDL goal <70        7. Bilateral carotid artery stenosis        8. Type 2 diabetes mellitus with other circulatory complication, with long-term current use of insulin        9. HOA on CPAP        10. Aneurysm of ascending aorta without rupture        11. Class 1 obesity due to excess calories with serious comorbidity and body mass index (BMI) of 34.0 to 34.9 in adult                   Plan:       1. Chronic diastolic congestive heart failure: NYHA class II. Stage C. Compensated and stable in office today. Continue Entresto, Jardiance, spironolactone and metoprolol. Discussed to continue lasix on an as needed basis. Reviewed signs and symptoms of CHF and what to report to office with patient. Patient instructed to restrict sodium and record daily weight. Patient is to report weight gain of greater than 2 lbs overnight or 5 lbs in 1 week. Patient verbalized understanding of instructions and plan of care. Patient has CardioMEMS device; patient reports that he is getting internet run to his house currently.     2. CAD: s/p CABG x 2 LIMA to LAD and SVG to OM on 7/6/2019 per Dr Tang. Abnormal Lexiscan 9/21/2022. Continue aspirin, metoprolol and rosuvastatin. Patient reports rare chronic chest pain. Continue Imdur. Discussed with patient to take NTG as needed for severe chest pain.     3. Paroxysmal atrial fibrillation: No recurrence on holter monitor 9/2022. Continue metoprolol and Eliquis.     4. Chronic anticoagulation: patient is on eliquis. He was recently in the ER with some  abdominal pain and diagnosed with diverticulitis. He states that he has had some blood in his stool and they are watching it closely to make sure it resolves with diverticulitis    5. Hypertension: Controlled in office today. Continue current medications. Recommend to monitor routinely and notify office if consistently >140/90.     6. Hyperlipidemia: Lipid panel from 5/21/2024 demonstrated elevated triglycerides at 214; LDL was at 37 (which is at goal of <70). Continue rosuvastatin, zetia and tricor    7. Bilateral carotid artery stenosis: Follows with vascular, Dr Echavarria    8. Type 2 DM: managed and followed by PCP. Continue jardiance. A1C 7.0 7/28/2024    9. HOA: Patient reports non compliance with CPAP. He states that he can't breathe with it on. Was seen by Dr Ibarra for inspire but was not done due to patient being high risk for surgery.    10. Thoracic aortic aneurysm: patient continues to follow up with CTS, Dr Tang for routine surveillance. Stable at 4.3 cm on CTA 2/2024.    11. Obesity: BMI is >= 30 and <35. (Class 1 Obesity). The following options were offered after discussion;: exercise counseling/recommendations and nutrition counseling/recommendations    I attest that all portions of this note reviewed and all information has been updated by myself to reflect the patient's current status.      I spent 36 minutes caring for Carlos A on this date of service. This time includes time spent by me in the following activities:preparing for the visit, reviewing tests, obtaining and/or reviewing a separately obtained history, performing a medically appropriate examination and/or evaluation , counseling and educating the patient/family/caregiver, and documenting information in the medical record    Patient is to follow up in office in 6 months or sooner if needed.

## 2024-11-25 ENCOUNTER — OFFICE VISIT (OUTPATIENT)
Dept: WOUND CARE | Facility: HOSPITAL | Age: 64
End: 2024-11-25
Payer: MEDICARE

## 2024-11-25 PROCEDURE — 11055 PARING/CUTG B9 HYPRKER LES 1: CPT

## 2024-11-25 PROCEDURE — G0463 HOSPITAL OUTPT CLINIC VISIT: HCPCS

## 2024-12-26 ENCOUNTER — HOSPITAL ENCOUNTER (EMERGENCY)
Facility: HOSPITAL | Age: 64
Discharge: HOME | End: 2024-12-26
Attending: EMERGENCY MEDICINE
Payer: OTHER GOVERNMENT

## 2024-12-26 ENCOUNTER — APPOINTMENT (OUTPATIENT)
Dept: GENERAL RADIOLOGY | Facility: HOSPITAL | Age: 64
End: 2024-12-26
Payer: OTHER GOVERNMENT

## 2024-12-26 VITALS
HEIGHT: 72 IN | RESPIRATION RATE: 18 BRPM | SYSTOLIC BLOOD PRESSURE: 121 MMHG | WEIGHT: 248.5 LBS | OXYGEN SATURATION: 95 % | DIASTOLIC BLOOD PRESSURE: 75 MMHG | BODY MASS INDEX: 33.66 KG/M2 | TEMPERATURE: 98.2 F | HEART RATE: 56 BPM

## 2024-12-26 DIAGNOSIS — J20.8 VIRAL BRONCHITIS: Primary | ICD-10-CM

## 2024-12-26 LAB
ALBUMIN SERPL-MCNC: 3.9 G/DL (ref 3.5–5.2)
ALBUMIN/GLOB SERPL: 1.4 G/DL
ALP SERPL-CCNC: 77 U/L (ref 39–117)
ALT SERPL W P-5'-P-CCNC: 14 U/L (ref 1–41)
ANION GAP SERPL CALCULATED.3IONS-SCNC: 11 MMOL/L (ref 5–15)
AST SERPL-CCNC: 14 U/L (ref 1–40)
BASOPHILS # BLD AUTO: 0.03 10*3/MM3 (ref 0–0.2)
BASOPHILS NFR BLD AUTO: 0.5 % (ref 0–1.5)
BILIRUB SERPL-MCNC: 0.8 MG/DL (ref 0–1.2)
BUN SERPL-MCNC: 15 MG/DL (ref 8–23)
BUN/CREAT SERPL: 17.2 (ref 7–25)
CALCIUM SPEC-SCNC: 9.1 MG/DL (ref 8.6–10.5)
CHLORIDE SERPL-SCNC: 106 MMOL/L (ref 98–107)
CO2 SERPL-SCNC: 22 MMOL/L (ref 22–29)
CREAT SERPL-MCNC: 0.87 MG/DL (ref 0.76–1.27)
D-LACTATE SERPL-SCNC: 2.6 MMOL/L (ref 0.5–2)
DEPRECATED RDW RBC AUTO: 42.5 FL (ref 37–54)
EGFRCR SERPLBLD CKD-EPI 2021: 95.2 ML/MIN/1.73
EOSINOPHIL # BLD AUTO: 0.17 10*3/MM3 (ref 0–0.4)
EOSINOPHIL NFR BLD AUTO: 2.9 % (ref 0.3–6.2)
ERYTHROCYTE [DISTWIDTH] IN BLOOD BY AUTOMATED COUNT: 13.1 % (ref 12.3–15.4)
FLUAV RNA RESP QL NAA+PROBE: NOT DETECTED
FLUBV RNA RESP QL NAA+PROBE: NOT DETECTED
GLOBULIN UR ELPH-MCNC: 2.7 GM/DL
GLUCOSE SERPL-MCNC: 153 MG/DL (ref 65–99)
HCT VFR BLD AUTO: 41.7 % (ref 37.5–51)
HGB BLD-MCNC: 13.7 G/DL (ref 13–17.7)
IMM GRANULOCYTES # BLD AUTO: 0.02 10*3/MM3 (ref 0–0.05)
IMM GRANULOCYTES NFR BLD AUTO: 0.3 % (ref 0–0.5)
LYMPHOCYTES # BLD AUTO: 1.6 10*3/MM3 (ref 0.7–3.1)
LYMPHOCYTES NFR BLD AUTO: 27.5 % (ref 19.6–45.3)
MAGNESIUM SERPL-MCNC: 2.1 MG/DL (ref 1.6–2.4)
MCH RBC QN AUTO: 29.8 PG (ref 26.6–33)
MCHC RBC AUTO-ENTMCNC: 32.9 G/DL (ref 31.5–35.7)
MCV RBC AUTO: 90.7 FL (ref 79–97)
MONOCYTES # BLD AUTO: 0.4 10*3/MM3 (ref 0.1–0.9)
MONOCYTES NFR BLD AUTO: 6.9 % (ref 5–12)
NEUTROPHILS NFR BLD AUTO: 3.6 10*3/MM3 (ref 1.7–7)
NEUTROPHILS NFR BLD AUTO: 61.9 % (ref 42.7–76)
NRBC BLD AUTO-RTO: 0 /100 WBC (ref 0–0.2)
PLATELET # BLD AUTO: 126 10*3/MM3 (ref 140–450)
PMV BLD AUTO: 11.2 FL (ref 6–12)
POTASSIUM SERPL-SCNC: 4.1 MMOL/L (ref 3.5–5.2)
PROT SERPL-MCNC: 6.6 G/DL (ref 6–8.5)
RBC # BLD AUTO: 4.6 10*6/MM3 (ref 4.14–5.8)
RSV RNA RESP QL NAA+PROBE: NOT DETECTED
SARS-COV-2 RNA RESP QL NAA+PROBE: NOT DETECTED
SODIUM SERPL-SCNC: 139 MMOL/L (ref 136–145)
WBC NRBC COR # BLD AUTO: 5.82 10*3/MM3 (ref 3.4–10.8)

## 2024-12-26 PROCEDURE — 87040 BLOOD CULTURE FOR BACTERIA: CPT | Performed by: EMERGENCY MEDICINE

## 2024-12-26 PROCEDURE — 80053 COMPREHEN METABOLIC PANEL: CPT | Performed by: EMERGENCY MEDICINE

## 2024-12-26 PROCEDURE — 87637 SARSCOV2&INF A&B&RSV AMP PRB: CPT | Performed by: EMERGENCY MEDICINE

## 2024-12-26 PROCEDURE — 71046 X-RAY EXAM CHEST 2 VIEWS: CPT

## 2024-12-26 PROCEDURE — 83735 ASSAY OF MAGNESIUM: CPT | Performed by: EMERGENCY MEDICINE

## 2024-12-26 PROCEDURE — 36415 COLL VENOUS BLD VENIPUNCTURE: CPT

## 2024-12-26 PROCEDURE — 85025 COMPLETE CBC W/AUTO DIFF WBC: CPT | Performed by: EMERGENCY MEDICINE

## 2024-12-26 PROCEDURE — 99283 EMERGENCY DEPT VISIT LOW MDM: CPT

## 2024-12-26 PROCEDURE — 83605 ASSAY OF LACTIC ACID: CPT | Performed by: EMERGENCY MEDICINE

## 2024-12-26 PROCEDURE — 94640 AIRWAY INHALATION TREATMENT: CPT

## 2024-12-26 RX ORDER — METHYLPREDNISOLONE 4 MG/1
TABLET ORAL
Qty: 21 TABLET | Refills: 0 | Status: SHIPPED | OUTPATIENT
Start: 2024-12-26

## 2024-12-26 RX ORDER — IPRATROPIUM BROMIDE AND ALBUTEROL SULFATE 2.5; .5 MG/3ML; MG/3ML
3 SOLUTION RESPIRATORY (INHALATION) ONCE
Status: COMPLETED | OUTPATIENT
Start: 2024-12-26 | End: 2024-12-26

## 2024-12-26 RX ORDER — SODIUM CHLORIDE 0.9 % (FLUSH) 0.9 %
10 SYRINGE (ML) INJECTION AS NEEDED
Status: DISCONTINUED | OUTPATIENT
Start: 2024-12-26 | End: 2024-12-26 | Stop reason: HOSPADM

## 2024-12-26 RX ORDER — HYDROCODONE BITARTRATE AND HOMATROPINE METHYLBROMIDE ORAL SOLUTION 5; 1.5 MG/5ML; MG/5ML
5 LIQUID ORAL EVERY 4 HOURS PRN
Qty: 120 ML | Refills: 0 | Status: SHIPPED | OUTPATIENT
Start: 2024-12-26

## 2024-12-26 RX ADMIN — IPRATROPIUM BROMIDE AND ALBUTEROL SULFATE 3 ML: .5; 3 SOLUTION RESPIRATORY (INHALATION) at 11:19

## 2024-12-26 NOTE — ED PROVIDER NOTES
Subjective   History of Present Illness  Patient presents with complaint of cough and congestion and wheezing.  He has been going on for 2 weeks.  He has been trying over-the-counter medication without any relief.  He states his cough is productive occasionally of some yellowish sputum.  He does note any definite fever or chills.  He does hear a lot of wheezing sometimes when he coughs very badly.  He called his doctor today and they told him to come to the ER for evaluation he does not smoke.  He does not know of anyone else around who has been ill.    History provided by:  Patient   used: No    Cough  Cough characteristics:  Productive  Sputum characteristics:  Yellow  Severity:  Moderate  Onset quality:  Gradual  Duration:  2 weeks  Timing:  Constant  Progression:  Unchanged  Chronicity:  New  Smoker: no    Context: upper respiratory infection    Context: not animal exposure, not exposure to allergens, not fumes, not occupational exposure, not sick contacts, not smoke exposure, not weather changes and not with activity    Relieved by:  Nothing  Worsened by:  Nothing  Ineffective treatments:  None tried  Associated symptoms: myalgias, sinus congestion and wheezing    Associated symptoms: no chest pain, no chills, no diaphoresis, no ear fullness, no ear pain, no eye discharge, no fever, no headaches, no rash, no rhinorrhea, no shortness of breath, no sore throat and no weight loss    Risk factors: no chemical exposure, no recent infection and no recent travel        Review of Systems   Constitutional: Negative.  Negative for chills, diaphoresis, fever and weight loss.   HENT: Negative.  Negative for ear pain, rhinorrhea and sore throat.    Eyes:  Negative for discharge.   Respiratory:  Positive for cough and wheezing. Negative for shortness of breath.    Cardiovascular: Negative.  Negative for chest pain.   Gastrointestinal: Negative.    Genitourinary: Negative.    Musculoskeletal:  Positive for  myalgias.   Skin:  Negative for rash.   Neurological: Negative.  Negative for headaches.   Psychiatric/Behavioral: Negative.     All other systems reviewed and are negative.      Past Medical History:   Diagnosis Date    Arthritis     Asthma     Cancer     skin    Carotid disease, bilateral     Cellulitis     right foot - on antibiotics 6-    Chest pain     Chronic diastolic congestive heart failure 09/07/2019    Chronic sinusitis     Maribell bullosa     Coronary artery disease involving native coronary artery of native heart with unstable angina pectoris 01/17/2017    Deviated septum     Diabetes mellitus     Difficulty urinating     Diverticulitis     Enlarged prostate     Fatty liver     GERD (gastroesophageal reflux disease)     [place] (hard of hearing)     Does have hearing aids    Hyperlipidemia LDL goal <70 02/02/2017    Hypertrophy of nasal turbinates     Keratoderma     Kidney stone     Lung nodules     lower right lung    Migraine     Murmur, heart     Myocardial infarction     Obesity     Paroxysmal atrial fibrillation 07/11/2019    Personal history of COVID-19 07/2021    PONV (postoperative nausea and vomiting)     Primary hypertension 10/16/2016    Psoriasis     Seizures     Sinus congestion     Skin cancer     Sleep apnea     not using cpap    SOB (shortness of breath)     Stroke     mild weakness on right side    UTI (urinary tract infection)        Allergies   Allergen Reactions    Flagyl [Metronidazole] Hives    Atorvastatin Other (See Comments) and Myalgia      - LIPITOR -   Muscle cramps    Ciprofloxacin Hives      - CIPRO -        Past Surgical History:   Procedure Laterality Date    CARDIAC CATHETERIZATION  01/2016    Dr. Broadbent; widely patent previously placed stents in the left anterior descending and obstructive disease involving the diagonal branch which was treated medically    CARDIAC CATHETERIZATION N/A 07/14/2017    Procedure: Left Heart Cath;  Surgeon: Wade Ramey MD;  Location:   [facility];  Service:     CARDIAC CATHETERIZATION Left 10/15/2018    Procedure: Cardiac Catheterization/Vascular Study;  Surgeon: Wade Ramey MD;  Location:  [facility];  Service: Cardiology    CARDIAC CATHETERIZATION  10/15/2018    Procedure: Functional Flow [place];  Surgeon: Wade Ramey MD;  Location:  [facility];  Service: Cardiology    CARDIAC CATHETERIZATION N/A 10/15/2018    Procedure: Left ventriculography;  Surgeon: Wade Ramey MD;  Location:  [facility];  Service: Cardiology    CARDIAC CATHETERIZATION Left 06/26/2019    Procedure: Cardiac Catheterization/Vascular Study VEL OK  HE WILL WAIT 1 YEAR FOR SHOULDER SURGERY ;  Surgeon: Wade Ramey MD;  Location:  [facility];  Service: Cardiology    CARDIAC CATHETERIZATION Left 04/30/2021    Procedure: Coronary angiography;  Surgeon: Sahil Llamas MD;  Location:  [facility];  Service: Cardiology;  Laterality: Left;    CARDIAC CATHETERIZATION N/A 04/30/2021    Procedure: Percutaneous Coronary Intervention;  Surgeon: Sahil Llamas MD;  Location:  [facility];  Service: Cardiology;  Laterality: N/A;    CARDIAC CATHETERIZATION N/A 11/09/2022    Procedure: Left Heart Cath with SVGs;  Surgeon: Wade Ramey MD;  Location: [facility] CATH INVASIVE LOCATION;  Service: Cardiology;  Laterality: N/A;    CARPAL TUNNEL RELEASE      January 2024 and pther hand February 2024    CHOLECYSTECTOMY      CHOLECYSTECTOMY WITH INTRAOPERATIVE CHOLANGIOGRAM N/A 08/01/2018    Procedure: CHOLECYSTECTOMY LAPAROSCOPIC INTRAOPERATIVE CHOLANGIOGRAM;  Surgeon: Shane Ann MD;  Location: [facility] OR;  Service: General    COLONOSCOPY N/A 07/14/2020    Procedure: COLONOSCOPY WITH ANESTHESIA;  Surgeon: Anupam Morales DO;  Location: [facility] ENDOSCOPY;  Service: Gastroenterology;  Laterality: N/A;  pre: abdominal pain  post: diverticulosis  Vinayak Correia,  PA    CORONARY ANGIOPLASTY      CORONARY ARTERY BYPASS GRAFT N/A 07/06/2019    Procedure: CABG X2 WITH LIMA, LEFT LEG OVH, AND PLACEMENT OF LEFT FEMORAL ARTERIAL LINE;  Surgeon: Steven Tang MD;  Location:  [facility] OR;  Service: Cardiothoracic    CORONARY STENT PLACEMENT      x 6    CYSTOSCOPY TRANSURETHRAL RESECTION OF PROSTATE N/A 01/23/2023    Procedure: CYSTOSCOPY TRANSURETHRAL RESECTION OF PROSTATE;  Surgeon: Latrell Pope MD;  Location:  [facility] OR;  Service: Urology;  Laterality: N/A;    ENDOSCOPIC FUNCTIONAL SINUS SURGERY (FESS) Bilateral 12/13/2017    Procedure: PROCEDURE PERFORMED:  Bilateral functional endoscopic anterior ethmoidectomy with bilateral middle meatal antrostomy Septoplasty Right kathia bullosa resection Bilateral inferior turbinate reduction via Cob[place];  Surgeon: Mayank Ibarra MD;  Location:  [facility] OR;  Service:     ENDOSCOPY N/A 07/30/2018    Procedure: ESOPHAGOGASTRODUODENOSCOPY WITH ANESTHESIA;  Surgeon: Benitez Mas MD;  Location: [facility] ENDOSCOPY;  Service: Gastroenterology    ENDOSCOPY N/A 07/14/2020    Procedure: ESOPHAGOGASTRODUODENOSCOPY WITH ANESTHESIA;  Surgeon: Anupam Morales DO;  Location: [facility] ENDOSCOPY;  Service: Gastroenterology;  Laterality: N/A;  pre: abdominal pain  post: esophagitis  Vinayak Correia, PA    HERNIA REPAIR      x2 inguinal area    KIDNEY STONE SURGERY      KNEE ARTHROSCOPY Right 03/01/2022    Procedure: RIGHT KNEE PARTIAL LATERAL MENISCECTOMY;  Surgeon: Pedro Pablo Song MD;  Location:  [facility] OR;  Service: Orthopedics;  Laterality: Right;    KNEE SURGERY Right     OTHER SURGICAL HISTORY      urolift    PROSTATE SURGERY      Dr. Badillo - 2017    PULMONARY ARTERY PRESSURE SENSOR IMPLANT N/A 05/08/2023    Procedure: PA Pressure Sensor Implant;  Surgeon: Wade Ramey MD;  Location:  [facility];  Service: Cardiology;  Laterality: N/A;    ROTATOR CUFF REPAIR Right     SLEEP ENDOSCOPY N/A 02/27/2023     Procedure: Videosleep endoscopy;  Surgeon: Mayank Ibarra MD;  Location:  [facility] OR;  Service: ENT;  Laterality: N/A;    THUMB AMPUTATION Left     partial    TOE FUSION Right 7/3/2024    Procedure: Hallux Interphalangeal Joint Arthrodesis, Bone Graft [place] - Right Foot;  Surgeon: Hernan Villa DPM;  Location:  [facility] OR;  Service: Podiatry;  Laterality: Right;    TOE SURGERY      great toe       Family History   Problem Relation Age of Onset    Heart disease Father     COPD Mother     Hypertension Mother     Asthma Mother     No Known Problems Sister     Colon cancer Paternal Uncle     Prostate cancer Maternal Grandfather     No Known Problems Sister     Colon cancer Maternal Grandmother     Colon polyps Maternal Grandmother        Social History     Socioeconomic History    Marital status:    Tobacco Use    Smoking status: Former     Current packs/day: 0.00     Average packs/day: 1.5 packs/day for 18.0 years (27.0 ttl pk-yrs)     Types: Cigarettes, Cigars     Start date:      Quit date:      Years since quittin.0     Passive exposure: Past    Smokeless tobacco: Former     Types: Chew     Quit date:    Vaping Use    Vaping status: Never Used   Substance and Sexual Activity    Alcohol use: No     Comment: 0    Drug use: No    Sexual activity: Defer       Prior to Admission medications    Medication Sig Start Date End Date Taking? Authorizing Provider   acetaminophen (TYLENOL) 325 MG tablet Take 1 tablet by mouth Every 6 (Six) Hours As Needed for Mild Pain.    Rohan Christina MD   albuterol (PROVENTIL HFA;VENTOLIN HFA) 108 (90 BASE) MCG/ACT inhaler Inhale 2 puffs Every 6 (Six) Hours As Needed for Wheezing.    Rohan Christina MD   apixaban (ELIQUIS) 5 MG tablet tablet Take 1 tablet by mouth 2 (Two) Times a Day.    Rohan Christina MD   Aspirin 81 MG capsule Take 81 mg by mouth Daily.    Rohan Christina MD   calcium polycarbophil (FIBERCON) 625 MG tablet Take 1  tablet by mouth Daily.    Rohan Christina MD   carboxymethylcellulose (REFRESH PLUS) 0.5 % solution Administer 1 drop to both eyes 4 (Four) Times a Day As Needed for Dry Eyes.    Rohan Christina MD   docusate sodium (COLACE) 100 MG capsule Take 1 capsule by mouth Daily As Needed for Constipation.    Rohan Christina MD   empagliflozin (JARDIANCE) 25 MG tablet tablet Take 1 tablet by mouth Daily.    Rohan Christina MD   ezetimibe (ZETIA) 10 MG tablet Take 1 tablet by mouth Daily.    Rohan Christina MD   fluticasone (FLONASE) 50 MCG/ACT nasal spray Administer 2 sprays into the nostril(s) as directed by provider Daily.    Rohan Christina MD   furosemide (Lasix) 40 MG tablet Take 1 tablet by mouth Daily As Needed (take as needed for weight gain more than 2 pounds in a day or more than than 3 pounds in 2 days.). 10/17/24   Ben Burden APRN   gabapentin (NEURONTIN) 300 MG capsule Take 1 capsule by mouth Every Night.    Rohan Christina MD   guaiFENesin (MUCINEX) 600 MG 12 hr tablet Take 2 tablets by mouth Daily As Needed for Congestion.    Rohan Christina MD   insulin aspart (novoLOG FLEXPEN) 100 UNIT/ML solution pen-injector sc pen Inject 50 Units under the skin into the appropriate area as directed 3 (Three) Times a Day With Meals. Can use 5 to 6 more units if needed in early evening.    Rohan Christina MD   INSULIN GLARGINE-YFGN SC Inject 100 Units under the skin into the appropriate area as directed 2 (Two) Times a Day.    Rohan Christina MD   isosorbide mononitrate (IMDUR) 120 MG 24 hr tablet Take 1 tablet by mouth Daily. 1/11/22   Agnieszka Jeff APRN   lamoTRIgine (LaMICtal) 200 MG tablet Take 1 tablet by mouth 2 (Two) Times a Day. 8/20/24   Flako Freeman APRN   levETIRAcetam (KEPPRA) 500 MG tablet Take 3 tablets by mouth Every Morning. 8/22/23   Flako Freeman APRN   levETIRAcetam (KEPPRA) 500 MG tablet Take 4 tablets by mouth Every Night.  8/22/23   Flako Freeman APRN   metFORMIN (GLUCOPHAGE) 1000 MG tablet Take 1 tablet by mouth 2 (Two) Times a Day With Meals.    Rohan Christina MD   metoprolol tartrate (LOPRESSOR) 100 MG tablet Take 1 tablet by mouth 2 (Two) Times a Day.    Rohan Christina MD   Multiple Vitamin (MULTI VITAMIN PO) Take 1 tablet by mouth Daily.    Rohan Christina MD   nitroglycerin (NITROSTAT) 0.4 MG SL tablet Place 1 tablet under the tongue Every 5 (Five) Minutes As Needed for Chest Pain. Take no more than 3 doses in 15 minutes. 4/4/24   Ben Burden APRN   ondansetron ODT (ZOFRAN-ODT) 4 MG disintegrating tablet Place 1 tablet on the tongue Every 6 (Six) Hours As Needed for Nausea or Vomiting for up to 12 doses. 9/19/24   Radha Rea APRN   pantoprazole (PROTONIX) 40 MG EC tablet Take 1 tablet by mouth 2 (Two) Times a Day.    Rohan Christina MD   polyethylene glycol (MIRALAX) 17 g packet Take 17 g by mouth Daily.    Rohan Christina MD   psyllium (METAMUCIL) 58.6 % packet Take 1 packet by mouth Daily As Needed (constipation).    Rohan Christina MD   Rimegepant Sulfate (Nurtec) 75 MG tablet dispersible tablet Take 1 tablet by mouth 3 (Three) Times a Day As Needed (migraine).    Rohan Christina MD   rosuvastatin (CRESTOR) 40 MG tablet Take 0.5 tablets by mouth Every Night.    Rohan Christina MD   sacubitril-valsartan (Entresto)  MG tablet Take 1 tablet by mouth 2 (Two) Times a Day.    Rohan Christina MD   Semaglutide,0.25 or 0.5MG/DOS, (Ozempic, 0.25 or 0.5 MG/DOSE,) 2 MG/1.5ML solution pen-injector Inject 0.5 mg under the skin into the appropriate area as directed 1 (One) Time Per Week. Monday    Rohan Christina MD   tamsulosin (FLOMAX) 0.4 MG capsule 24 hr capsule Take 1 capsule by mouth Daily.    Rohan Christina MD   triamcinolone (KENALOG) 0.1 % ointment Apply 1 Application topically to the appropriate area as directed 2 (Two) Times a Day. To feet     Provider, MD Rohan       Medications   ipratropium-albuterol (DUO-NEB) nebulizer solution 3 mL (3 mL Nebulization Given 12/26/24 1119)       Vitals:    12/26/24 1243   BP: 121/75   Pulse: 56   Resp: 18   Temp: 98.2 °F (36.8 °C)   SpO2: 95%         Objective   Physical Exam  Vitals and nursing note reviewed.   Constitutional:       Appearance: Normal appearance.   Eyes:      Extraocular Movements: Extraocular movements intact.      Pupils: Pupils are equal, round, and reactive to light.   Cardiovascular:      Rate and Rhythm: Normal rate and regular rhythm.   Pulmonary:      Breath sounds: Wheezing and rhonchi present.   Abdominal:      General: Abdomen is flat.      Palpations: Abdomen is soft.   Musculoskeletal:         General: Normal range of motion.      Cervical back: Normal range of motion and neck supple.   Skin:     General: Skin is warm and dry.   Neurological:      General: No focal deficit present.      Mental Status: He is alert and oriented to person, place, and time.   Psychiatric:         Mood and Affect: Mood normal.         Behavior: Behavior normal.         Procedures         Lab Results (last 24 hours)       Procedure Component Value Units Date/Time    CBC & Differential [[id number]]  (Abnormal) Collected: 12/26/24 1024    Specimen: Blood Updated: 12/26/24 1051    Narrative:      The following orders were created for panel order CBC & Differential.  Procedure                               Abnormality         Status                     ---------                               -----------         ------                     CBC Auto Differential[[id number]]        Abnormal            Final result                 Please view results for these tests on the individual orders.    Blood Culture - Blood, Arm, Left [[id number]] Collected: 12/26/24 1024    Specimen: Blood from Arm, Left Updated: 12/26/24 1043    Lactic Acid, Plasma [[id number]]  (Abnormal) Collected: 12/26/24 1024    Specimen: Blood  Updated: 12/26/24 1105     Lactate 2.6 mmol/L     CBC Auto Differential [[id number]]  (Abnormal) Collected: 12/26/24 1024    Specimen: Blood Updated: 12/26/24 1051     WBC 5.82 10*3/mm3      RBC 4.60 10*6/mm3      Hemoglobin 13.7 g/dL      Hematocrit 41.7 %      MCV 90.7 fL      MCH 29.8 pg      MCHC 32.9 g/dL      RDW 13.1 %      RDW-SD 42.5 fl      MPV 11.2 fL      Platelets 126 10*3/mm3      Neutrophil % 61.9 %      Lymphocyte % 27.5 %      Monocyte % 6.9 %      Eosinophil % 2.9 %      Basophil % 0.5 %      Immature Grans % 0.3 %      Neutrophils, Absolute 3.60 10*3/mm3      Lymphocytes, Absolute 1.60 10*3/mm3      Monocytes, Absolute 0.40 10*3/mm3      Eosinophils, Absolute 0.17 10*3/mm3      Basophils, Absolute 0.03 10*3/mm3      Immature Grans, Absolute 0.02 10*3/mm3      nRBC 0.0 /100 WBC     COVID PRE-OP / PRE-PROCEDURE SCREENING ORDER (NO ISOLATION) - Swab, Nasopharynx [[id number]]  (Normal) Collected: 12/26/24 1026    Specimen: Swab from Nasopharynx Updated: 12/26/24 1127    Narrative:      The following orders were created for panel order COVID PRE-OP / PRE-PROCEDURE SCREENING ORDER (NO ISOLATION) - Swab, Nasopharynx.  Procedure                               Abnormality         Status                     ---------                               -----------         ------                     COVID-19, FLU A/B, RSV P...[[id number]]  Normal              Final result                 Please view results for these tests on the individual orders.    COVID-19, FLU A/B, RSV PCR 1 HR TAT - Swab, Nasopharynx [[id number]]  (Normal) Collected: 12/26/24 1026    Specimen: Swab from Nasopharynx Updated: 12/26/24 1127     COVID19 Not Detected     Influenza A PCR Not Detected     Influenza B PCR Not Detected     RSV, PCR Not Detected    Narrative:      Fact sheet for providers: https://www.fda.gov/media/897778/download    Fact sheet for patients: https://www.fda.gov/media/835414/download    Test performed by PCR.    Blood  Culture - Blood, Hand, Right [[id number]] Collected: 12/26/24 1030    Specimen: Blood from Hand, Right Updated: 12/26/24 1044    Comprehensive Metabolic Panel [[id number]]  (Abnormal) Collected: 12/26/24 1109    Specimen: Blood from Arm, Left Updated: 12/26/24 1137     Glucose 153 mg/dL      BUN 15 mg/dL      Creatinine 0.87 mg/dL      Sodium 139 mmol/L      Potassium 4.1 mmol/L      Chloride 106 mmol/L      CO2 22.0 mmol/L      Calcium 9.1 mg/dL      Total Protein 6.6 g/dL      Albumin 3.9 g/dL      ALT (SGPT) 14 U/L      AST (SGOT) 14 U/L      Alkaline Phosphatase 77 U/L      Total Bilirubin 0.8 mg/dL      Globulin 2.7 gm/dL      A/G Ratio 1.4 g/dL      BUN/Creatinine Ratio 17.2     Anion Gap 11.0 mmol/L      eGFR 95.2 mL/min/1.73     Narrative:      GFR Categories in Chronic Kidney Disease (CKD)      GFR Category          GFR (mL/min/1.73)    Interpretation  G1                     90 or greater         Normal or high (1)  G2                      60-89                Mild decrease (1)  G3a                   45-59                Mild to moderate decrease  G3b                   30-44                Moderate to severe decrease  G4                    15-29                Severe decrease  G5                    14 or less           Kidney failure          (1)In the absence of evidence of kidney disease, neither GFR category G1 or G2 fulfill the criteria for CKD.    eGFR calculation 2021 CKD-EPI creatinine equation, which does not include race as a factor    Magnesium [[id number]]  (Normal) Collected: 12/26/24 1109    Specimen: Blood from Arm, Left Updated: 12/26/24 1137     Magnesium 2.1 mg/dL             XR Chest 2 View   Final Result   Mild chronic lung changes, with no acute abnormality.       This report was signed and finalized on 12/26/2024 10:40 AM by Dr. Cyril Reyna MD.              ED Course          MDM  Number of Diagnoses or Management Options  Viral bronchitis: new and requires workup  Diagnosis  management comments: Tell the patient his chest x-ray does not show pneumonia.  His COVID and flu and RSV are negative.  This appears to be just a viral bronchitis.  I do not believe antibiotics will help.  I will give him some steroids to help symptomatically and something for the cough.  He is discharged in stable condition.       Amount and/or Complexity of Data Reviewed  Clinical lab tests: ordered and reviewed  Tests in the radiology section of CPT®: ordered and reviewed    Risk of Complications, Morbidity, and/or Mortality  Presenting problems: moderate  Diagnostic procedures: moderate  Management options: moderate    Patient Progress  Patient progress: stable        Final diagnoses:   Viral bronchitis          Andriy Bo Jr., MD  12/26/24 1024

## 2024-12-31 LAB
BACTERIA SPEC AEROBE CULT: NORMAL
BACTERIA SPEC AEROBE CULT: NORMAL

## 2025-01-21 DIAGNOSIS — I71.21 ANEURYSM OF ASCENDING AORTA WITHOUT RUPTURE: Primary | ICD-10-CM

## 2025-01-24 ENCOUNTER — TELEPHONE (OUTPATIENT)
Dept: CARDIAC SURGERY | Facility: CLINIC | Age: 65
End: 2025-01-24
Payer: OTHER GOVERNMENT

## 2025-03-20 DIAGNOSIS — G40.909 SEIZURE DISORDER: ICD-10-CM

## 2025-03-20 NOTE — TELEPHONE ENCOUNTER
PATIENT CALLED IN, IN PRIOR PHONE ENCOUNTER. I MADE HIM AN APPOINTMENT TO BE SEEN AND TOLD HIM I WOULD HAVE HIS MEDS SENT. I DID CONFIRM HE IS STILL DOING LAMICTAL AND KEPPRA HE TAKES 4 500MG TABLETS AT NIGHT AND 3 500MG TABLETS IN THE MORNING.

## 2025-03-21 RX ORDER — LEVETIRACETAM 500 MG/1
1500 TABLET ORAL EVERY MORNING
Qty: 270 TABLET | Refills: 0 | Status: SHIPPED | OUTPATIENT
Start: 2025-03-21

## 2025-03-21 RX ORDER — LEVETIRACETAM 500 MG/1
2000 TABLET ORAL NIGHTLY
Qty: 360 TABLET | Refills: 0 | Status: SHIPPED | OUTPATIENT
Start: 2025-03-21

## 2025-03-21 RX ORDER — LAMOTRIGINE 200 MG/1
200 TABLET ORAL 2 TIMES DAILY
Qty: 180 TABLET | Refills: 0 | Status: SHIPPED | OUTPATIENT
Start: 2025-03-21

## 2025-03-26 DIAGNOSIS — G40.909 SEIZURE DISORDER: ICD-10-CM

## 2025-03-26 RX ORDER — LAMOTRIGINE 200 MG/1
200 TABLET ORAL 2 TIMES DAILY
Qty: 180 TABLET | Refills: 1 | Status: SHIPPED | OUTPATIENT
Start: 2025-03-26

## 2025-03-26 RX ORDER — LEVETIRACETAM 500 MG/1
1500 TABLET ORAL EVERY MORNING
Qty: 270 TABLET | Refills: 1 | Status: SHIPPED | OUTPATIENT
Start: 2025-03-26

## 2025-03-26 RX ORDER — LEVETIRACETAM 500 MG/1
2000 TABLET ORAL NIGHTLY
Qty: 360 TABLET | Refills: 1 | Status: SHIPPED | OUTPATIENT
Start: 2025-03-26

## 2025-04-09 ENCOUNTER — HOSPITAL ENCOUNTER (OUTPATIENT)
Dept: CT IMAGING | Facility: HOSPITAL | Age: 65
Discharge: HOME | End: 2025-04-09
Admitting: NURSE PRACTITIONER
Payer: OTHER GOVERNMENT

## 2025-04-09 ENCOUNTER — OFFICE VISIT (OUTPATIENT)
Dept: NEUROLOGY | Facility: CLINIC | Age: 65
End: 2025-04-09
Payer: OTHER GOVERNMENT

## 2025-04-09 ENCOUNTER — OFFICE VISIT (OUTPATIENT)
Dept: CARDIAC SURGERY | Facility: CLINIC | Age: 65
End: 2025-04-09
Payer: OTHER GOVERNMENT

## 2025-04-09 VITALS
SYSTOLIC BLOOD PRESSURE: 132 MMHG | OXYGEN SATURATION: 97 % | BODY MASS INDEX: 33.59 KG/M2 | WEIGHT: 248 LBS | HEIGHT: 72 IN | DIASTOLIC BLOOD PRESSURE: 68 MMHG | HEART RATE: 55 BPM

## 2025-04-09 VITALS
HEART RATE: 62 BPM | WEIGHT: 242 LBS | SYSTOLIC BLOOD PRESSURE: 117 MMHG | BODY MASS INDEX: 32.78 KG/M2 | DIASTOLIC BLOOD PRESSURE: 62 MMHG | OXYGEN SATURATION: 97 % | HEIGHT: 72 IN

## 2025-04-09 DIAGNOSIS — G63 POLYNEUROPATHY ASSOCIATED WITH UNDERLYING DISEASE: ICD-10-CM

## 2025-04-09 DIAGNOSIS — I71.21 ANEURYSM OF ASCENDING AORTA WITHOUT RUPTURE: Primary | ICD-10-CM

## 2025-04-09 DIAGNOSIS — G40.909 SEIZURE DISORDER: Primary | ICD-10-CM

## 2025-04-09 DIAGNOSIS — I71.21 ANEURYSM OF ASCENDING AORTA WITHOUT RUPTURE: ICD-10-CM

## 2025-04-09 DIAGNOSIS — G43.009 MIGRAINE WITHOUT AURA AND WITHOUT STATUS MIGRAINOSUS, NOT INTRACTABLE: ICD-10-CM

## 2025-04-09 LAB — CREAT BLDA-MCNC: 1 MG/DL (ref 0.6–1.3)

## 2025-04-09 PROCEDURE — 25510000001 IOPAMIDOL PER 1 ML: Performed by: NURSE PRACTITIONER

## 2025-04-09 PROCEDURE — 99213 OFFICE O/P EST LOW 20 MIN: CPT | Performed by: THORACIC SURGERY (CARDIOTHORACIC VASCULAR SURGERY)

## 2025-04-09 PROCEDURE — 82565 ASSAY OF CREATININE: CPT

## 2025-04-09 PROCEDURE — 71275 CT ANGIOGRAPHY CHEST: CPT

## 2025-04-09 RX ORDER — LAMOTRIGINE 200 MG/1
200 TABLET ORAL 2 TIMES DAILY
Qty: 180 TABLET | Refills: 3 | Status: SHIPPED | OUTPATIENT
Start: 2025-04-09 | End: 2026-04-09

## 2025-04-09 RX ORDER — LEVETIRACETAM 500 MG/1
1500 TABLET ORAL EVERY MORNING
Qty: 270 TABLET | Refills: 3 | Status: SHIPPED | OUTPATIENT
Start: 2025-04-09 | End: 2026-04-09

## 2025-04-09 RX ORDER — IOPAMIDOL 755 MG/ML
100 INJECTION, SOLUTION INTRAVASCULAR
Status: COMPLETED | OUTPATIENT
Start: 2025-04-09 | End: 2025-04-09

## 2025-04-09 RX ORDER — LEVETIRACETAM 500 MG/1
2000 TABLET ORAL NIGHTLY
Qty: 360 TABLET | Refills: 3 | Status: SHIPPED | OUTPATIENT
Start: 2025-04-09 | End: 2026-04-09

## 2025-04-09 RX ADMIN — IOPAMIDOL 100 ML: 755 INJECTION, SOLUTION INTRAVENOUS at 09:54

## 2025-04-09 NOTE — PROGRESS NOTES
Neurology Consult Note    [facility]  [address], [address]  [place] [address]  Phone: [phone]  Fax: [phone]    Referring Provider:   GINA Felder  Primary Care Provider:  Vinayak Correia PA    Reason for Consult:  Seizure  Migraine  Polyneuropathy  Subjective      Chief Complaint   Patient presents with    Seizures     No seizures since last visit.       History of Present Illness  65-year-old male seen for follow-up of seizures, migraine and polyneuropathy.  Last seen in clinic by Cristel Freeman on 8/22/2023.  Patient currently managed on Lamictal 200 mg twice daily, levetiracetam 1500 mg in the morning and 2000 mg at night.  Regards to neuropathy, he is managed on gabapentin 300 mg nightly.    Today patient presents by himself.  Fortunately no seizures since being seen last.  His last seizure was greater than 3 years ago.  He is compliant with his lamotrigine and levetiracetam.  He does neuropathy symptoms are stable.  He was on gabapentin 300 mg nightly.  His PCP recently did twice a day dosing due to some chronic back pain.  She has since taken over prescribing this.  In regards to migraines, experiencing 2-3 a month.  He notes Nurtec is an effective abortive agent.  He would like to continue this.  Patient Active Problem List   Diagnosis    Type 2 diabetes mellitus with circulatory disorder, with long-term current use of insulin    Gastroesophageal reflux disease without esophagitis    Primary hypertension    Seizure disorder    Carotid disease, bilateral    Coronary artery disease involving native coronary artery of native heart without angina pectoris    Hyperlipidemia LDL goal <70    Chronic left arterial ischemic stroke, ICA (internal carotid artery)    HOA on CPAP    Benign non-nodular prostatic hyperplasia with lower urinary tract symptoms    Deviated nasal septum    Maribell bullosa    Hypertrophy of both inferior nasal turbinates    Chronic sinusitis of  both maxillary sinuses    S/P FESS (functional endoscopic sinus surgery)    Diabetic complication    Epigastric pain    Diabetic peripheral neuropathy    Class 1 obesity due to excess calories with serious comorbidity and body mass index (BMI) of 34.0 to 34.9 in adult    Aspirin-like platelet function defect    History of seizure    Lung nodules    Cardiac murmur    Thoracic aortic aneurysm without rupture    Paroxysmal atrial fibrillation    Atrial flutter    Chronic diastolic congestive heart failure with preserved ejection fraction    Fluid overload    Bilateral pleural effusion    Respiratory distress    Long-term use of high-risk medication    Dysphagia    Left lower quadrant abdominal pain    COVID-19 virus detected    Shortness of breath    S/P CABG x 2    Chronic anticoagulation    Old complex tear of lateral meniscus of right knee    BPH with obstruction/lower urinary tract symptoms    Daytime somnolence    Snoring    Aneurysm of ascending aorta without rupture    Presence of CardioMEMS HF system    Chest pain    Non-pressure chronic ulcer of other part of right foot with fat layer exposed    Diabetic ulcer of toe of right foot associated with type 2 diabetes mellitus, with fat layer exposed    Deformity, toe acquired, right    Cellulitis of right toe    Chronic heart failure with preserved ejection fraction (HFpEF)        Past Medical History:   Diagnosis Date    Arthritis     Asthma     Cancer     skin    Carotid disease, bilateral     Cellulitis     right foot - on antibiotics 6-    Chest pain     Chronic diastolic congestive heart failure 09/07/2019    Chronic sinusitis     Maribell bullosa     Coronary artery disease involving native coronary artery of native heart with unstable angina pectoris 01/17/2017    Deviated septum     Diabetes mellitus     Difficulty urinating     Diverticulitis     Enlarged prostate     Fatty liver     GERD (gastroesophageal reflux disease)     [place] (hard of hearing)      Does have hearing aids    Hyperlipidemia LDL goal <70 2017    Hypertrophy of nasal turbinates     Keratoderma     Kidney stone     Lung nodules     lower right lung    Migraine     Murmur, heart     Myocardial infarction     Obesity     Paroxysmal atrial fibrillation 2019    Personal history of COVID-19 2021    PONV (postoperative nausea and vomiting)     Primary hypertension 10/16/2016    Psoriasis     Seizures     Sinus congestion     Skin cancer     Sleep apnea     not using cpap    SOB (shortness of breath)     Stroke     mild weakness on right side    UTI (urinary tract infection)         Social History     Socioeconomic History    Marital status:    Tobacco Use    Smoking status: Former     Current packs/day: 0.00     Average packs/day: 1.5 packs/day for 18.0 years (27.0 ttl pk-yrs)     Types: Cigarettes, Cigars     Start date:      Quit date:      Years since quittin.2     Passive exposure: Past    Smokeless tobacco: Former     Types: Chew     Quit date:    Vaping Use    Vaping status: Never Used   Substance and Sexual Activity    Alcohol use: No     Comment: 0    Drug use: No    Sexual activity: Defer        Allergies   Allergen Reactions    Flagyl [Metronidazole] Hives    Atorvastatin Other (See Comments) and Myalgia      - LIPITOR -   Muscle cramps    Ciprofloxacin Hives      - CIPRO -           Current Outpatient Medications:     acetaminophen (TYLENOL) 325 MG tablet, Take 1 tablet by mouth Every 6 (Six) Hours As Needed for Mild Pain., Disp: , Rfl:     albuterol (PROVENTIL HFA;VENTOLIN HFA) 108 (90 BASE) MCG/ACT inhaler, Inhale 2 puffs Every 6 (Six) Hours As Needed for Wheezing., Disp: , Rfl:     apixaban (ELIQUIS) 5 MG tablet tablet, Take 1 tablet by mouth 2 (Two) Times a Day., Disp: , Rfl:     Aspirin 81 MG capsule, Take 81 mg by mouth Daily., Disp: , Rfl:     calcium polycarbophil (FIBERCON) 625 MG tablet, Take 1 tablet by mouth Daily., Disp: , Rfl:      carboxymethylcellulose (REFRESH PLUS) 0.5 % solution, Administer 1 drop to both eyes 4 (Four) Times a Day As Needed for Dry Eyes., Disp: , Rfl:     docusate sodium (COLACE) 100 MG capsule, Take 1 capsule by mouth Daily As Needed for Constipation., Disp: , Rfl:     empagliflozin (JARDIANCE) 25 MG tablet tablet, Take 1 tablet by mouth Daily., Disp: , Rfl:     ezetimibe (ZETIA) 10 MG tablet, Take 1 tablet by mouth Daily., Disp: , Rfl:     fluticasone (FLONASE) 50 MCG/ACT nasal spray, Administer 2 sprays into the nostril(s) as directed by provider Daily., Disp: , Rfl:     furosemide (Lasix) 40 MG tablet, Take 1 tablet by mouth Daily As Needed (take as needed for weight gain more than 2 pounds in a day or more than than 3 pounds in 2 days.)., Disp: 90 tablet, Rfl: 3    gabapentin (NEURONTIN) 300 MG capsule, Take 1 capsule by mouth Every Night. (Patient taking differently: Take 1 capsule by mouth 2 (Two) Times a Day.), Disp: , Rfl:     guaiFENesin (MUCINEX) 600 MG 12 hr tablet, Take 2 tablets by mouth Daily As Needed for Congestion., Disp: , Rfl:     insulin aspart (novoLOG FLEXPEN) 100 UNIT/ML solution pen-injector sc pen, Inject 50 Units under the skin into the appropriate area as directed 3 (Three) Times a Day With Meals. Can use 5 to 6 more units if needed in early evening., Disp: , Rfl:     INSULIN GLARGINE-YFGN SC, Inject 100 Units under the skin into the appropriate area as directed 2 (Two) Times a Day., Disp: , Rfl:     isosorbide mononitrate (IMDUR) 120 MG 24 hr tablet, Take 1 tablet by mouth Daily., Disp: 90 tablet, Rfl: 3    lamoTRIgine (LaMICtal) 200 MG tablet, Take 1 tablet by mouth 2 (Two) Times a Day., Disp: 180 tablet, Rfl: 3    levETIRAcetam (KEPPRA) 500 MG tablet, Take 3 tablets by mouth Every Morning., Disp: 270 tablet, Rfl: 3    levETIRAcetam (KEPPRA) 500 MG tablet, Take 4 tablets by mouth Every Night., Disp: 360 tablet, Rfl: 3    metFORMIN (GLUCOPHAGE) 1000 MG tablet, Take 1 tablet by mouth 2 (Two)  Times a Day With Meals., Disp: , Rfl:     metoprolol tartrate (LOPRESSOR) 100 MG tablet, Take 1 tablet by mouth 2 (Two) Times a Day., Disp: , Rfl:     Multiple Vitamin (MULTI VITAMIN PO), Take 1 tablet by mouth Daily., Disp: , Rfl:     nitroglycerin (NITROSTAT) 0.4 MG SL tablet, Place 1 tablet under the tongue Every 5 (Five) Minutes As Needed for Chest Pain. Take no more than 3 doses in 15 minutes., Disp: 25 tablet, Rfl: 2    ondansetron ODT (ZOFRAN-ODT) 4 MG disintegrating tablet, Place 1 tablet on the tongue Every 6 (Six) Hours As Needed for Nausea or Vomiting for up to 12 doses., Disp: 12 tablet, Rfl: 0    pantoprazole (PROTONIX) 40 MG EC tablet, Take 1 tablet by mouth 2 (Two) Times a Day., Disp: , Rfl:     polyethylene glycol (MIRALAX) 17 g packet, Take 17 g by mouth Daily., Disp: , Rfl:     psyllium (METAMUCIL) 58.6 % packet, Take 1 packet by mouth Daily As Needed (constipation)., Disp: , Rfl:     Rimegepant Sulfate (Nurtec) 75 MG tablet dispersible tablet, Take 1 tablet by mouth 3 (Three) Times a Day As Needed (migraine)., Disp: , Rfl:     rosuvastatin (CRESTOR) 40 MG tablet, Take 0.5 tablets by mouth Every Night., Disp: , Rfl:     sacubitril-valsartan (Entresto)  MG tablet, Take 1 tablet by mouth 2 (Two) Times a Day., Disp: , Rfl:     Semaglutide,0.25 or 0.5MG/DOS, (Ozempic, 0.25 or 0.5 MG/DOSE,) 2 MG/1.5ML solution pen-injector, Inject 0.5 mg under the skin into the appropriate area as directed 1 (One) Time Per Week. Monday, Disp: , Rfl:     tamsulosin (FLOMAX) 0.4 MG capsule 24 hr capsule, Take 1 capsule by mouth Daily., Disp: , Rfl:     triamcinolone (KENALOG) 0.1 % ointment, Apply 1 Application topically to the appropriate area as directed 2 (Two) Times a Day. To feet, Disp: , Rfl:     HYDROcodone Bit-Homatrop MBr (HYCODAN) 5-1.5 MG/5ML solution, Take 5 mL by mouth Every 4 (Four) Hours As Needed for Cough. (Patient not taking: Reported on 4/9/2025), Disp: 120 mL, Rfl: 0    methylPREDNISolone  "(MEDROL) 4 MG dose pack, Take as directed on package instructions. (Patient not taking: Reported on 4/9/2025), Disp: 21 tablet, Rfl: 0    rimegepant sulfate ODT (Nurtec) 75 MG disintegrating tablet, Place 1 tablet under the tongue 1 (One) Time As Needed (at onset) for up to 180 days., Disp: 8 tablet, Rfl: 5       Objective   Vital Signs:   /68   Pulse 55   Ht 182.9 cm (72\")   Wt 112 kg (248 lb)   SpO2 97%   BMI 33.63 kg/m²       Physical Exam  Vitals and nursing note reviewed.   Constitutional:       Appearance: Normal appearance.   HENT:      Head: Normocephalic.   Eyes:      General: Lids are normal.      Extraocular Movements: Extraocular movements intact.      Pupils: Pupils are equal, round, and reactive to light.   Pulmonary:      Effort: Pulmonary effort is normal. No respiratory distress.   Skin:     General: Skin is warm and dry.   Neurological:      Mental Status: He is alert.      Deep Tendon Reflexes: Reflexes are normal and symmetric.   Psychiatric:         Speech: Speech normal.        Neurological Exam  Mental Status  Alert. Oriented to person, place, time and situation. Speech is normal. Language is fluent with no aphasia.    Cranial Nerves  CN II: Visual fields full to confrontation.  CN III, IV, VI: Extraocular movements intact bilaterally. Normal lids and orbits bilaterally. Pupils equal round and reactive to light bilaterally.  CN V: Facial sensation is normal.  CN VII: Full and symmetric facial movement.  CN IX, X: Palate elevates symmetrically. Normal gag reflex.  CN XI: Shoulder shrug strength is normal.  CN XII: Tongue midline without atrophy or fasciculations.    Motor   Strength is 5/5 in all four extremities except as noted.                                             Right                     Left  Hip flexion                              5                          4+    Sensory  Light touch is normal in upper and lower extremities.     Reflexes  Deep tendon reflexes are 2+ and " symmetric in all four extremities.    Gait  Casual gait is normal including stance, stride, and arm swing.      Result Review :   The following data was reviewed by: TEENA Leach on 04/09/2025:  CMP          7/30/2024    06:33 9/18/2024    19:54 12/26/2024    11:09   CMP   Glucose 286  91  153    BUN 18  13  15    Creatinine 0.93  0.91  0.87    EGFR 90.6  93.0  95.2    Sodium 137  142  139    Potassium 4.3  4.3  4.1    Chloride 104  106  106    Calcium 8.8  9.7  9.1    Total Protein  7.6  6.6    Albumin  4.6  3.9    Globulin  3.0  2.7    Total Bilirubin  0.9  0.8    Alkaline Phosphatase  90  77    AST (SGOT)  16  14    ALT (SGPT)  20  14    Albumin/Globulin Ratio  1.5  1.4    BUN/Creatinine Ratio 19.4  14.3  17.2    Anion Gap 13.0  10.0  11.0      CBC w/diff          7/29/2024    04:33 9/18/2024    19:54 12/26/2024    10:24   CBC w/Diff   WBC 5.17  5.38  5.82    RBC 4.37  4.89  4.60    Hemoglobin 13.0  14.8  13.7    Hematocrit 39.6  45.6  41.7    MCV 90.6  93.3  90.7    MCH 29.7  30.3  29.8    MCHC 32.8  32.5  32.9    RDW 13.0  13.2  13.1    Platelets 93  129  126    Neutrophil Rel % 64.2  44.9  61.9    Immature Granulocyte Rel % 0.2  0.2  0.3    Lymphocyte Rel % 23.4  42.6  27.5    Monocyte Rel % 8.3  8.2  6.9    Eosinophil Rel % 3.5  3.5  2.9    Basophil Rel % 0.4  0.6  0.5    Office Visit with Flako Freeman APRN (08/22/2023)                     Impression:  Carlos A Boyer Jr. is a 65 y.o. male who presents for follow-up of seizures, neuropathy and migraine.  Regards to seizures, stable.  No need for medication adjustments.  Continue current plan.  Regards to neuropathy, likely due to diabetes.  Continue gabapentin 3 mg nightly.  Regards to the twice daily dosing, split to be done through primary care as our clinic does not manage chronic back pain.  In regards to migraines, these are stable no need for prevention.  Nurtec works as abortive agent, patient is required to be on CGRP's as he has  history of stroke and triptans are contraindicated.    Diagnoses and all orders for this visit:    1. Seizure disorder (Primary)  -     levETIRAcetam (KEPPRA) 500 MG tablet; Take 3 tablets by mouth Every Morning.  Dispense: 270 tablet; Refill: 3  -     levETIRAcetam (KEPPRA) 500 MG tablet; Take 4 tablets by mouth Every Night.  Dispense: 360 tablet; Refill: 3  -     lamoTRIgine (LaMICtal) 200 MG tablet; Take 1 tablet by mouth 2 (Two) Times a Day.  Dispense: 180 tablet; Refill: 3    2. Migraine without aura and without status migrainosus, not intractable  -     rimegepant sulfate ODT (Nurtec) 75 MG disintegrating tablet; Place 1 tablet under the tongue 1 (One) Time As Needed (at onset) for up to 180 days.  Dispense: 8 tablet; Refill: 5    3. Polyneuropathy associated with underlying disease        Plan:  Continue lamotrigine 200 mg tablet 1 tablet twice daily  Continue levetiracetam 1500 mg in the morning and 2000 mg at night  Continue Nurtec 75 mg ODT for migraine   Triptan medication class contraindicated due to history of stroke  Fall precautions  Full neuropathy exam next appointment  Follow-up with PCP as scheduled  Follow-up in our clinic 6 months    The patient and I have discussed the plan of care and he is in full agreement at this time.     Follow Up   Return in about 6 months (around 10/9/2025) for Neuropathy, Seizure, Migraine/Headache.            TEENA Leach  25  08:53 CDT

## 2025-04-15 ENCOUNTER — TELEPHONE (OUTPATIENT)
Dept: CARDIAC SURGERY | Facility: CLINIC | Age: 65
End: 2025-04-15
Payer: OTHER GOVERNMENT

## 2025-04-15 NOTE — TELEPHONE ENCOUNTER
Discussed With Dr. Tang, from a cardiac surgery standpoint there is no contraindication for MRI, however as discussed with Dr. Tang at visit last week there is a foreign body in the left pulmonary artery, it appears metallic in structure.  It was not present in CT scan before or immediately after surgery.  Will update when more info obtained

## 2025-04-15 NOTE — TELEPHONE ENCOUNTER
Pt stopped in office today requesting to speak with nurse for Dr Tang.  Advised pt that Skye DICKINSON is currently rounding in the hosp but that I would put in a message for her.  Pt states he needs a letter from Dr Tang giving clearance for him to have an MRI due to a piece of metal that Dr Tang told pt is in his aorta.  Can reach pt at #[phone]/alvaro

## 2025-04-16 ENCOUNTER — TELEPHONE (OUTPATIENT)
Dept: CARDIOLOGY | Facility: CLINIC | Age: 65
End: 2025-04-16
Payer: OTHER GOVERNMENT

## 2025-04-16 NOTE — TELEPHONE ENCOUNTER
Please let patient know that I spoke with cardiology services, Ben DICKINSON.  Patient has an upcoming visit with her at the end of the month.  He has a CardioMEMS device and that is the foreign body/metallic structure seen in the left PA on his CT scans.  Again, from cardiac surgery/CABG standpoint going forward there is no contraindication for MRI but unaware of safety from a CardioMEMS implant device standpoint.  Ben will reach out and follow-up on this. Thanks.

## 2025-04-16 NOTE — TELEPHONE ENCOUNTER
Called pt and informed as directed.  He voiced understanding and will await call back from office of Ben DICKINSON re: this device/alvaro

## 2025-04-16 NOTE — TELEPHONE ENCOUNTER
Called pt with information given by Ben. He needed the information faxed to MACKENZIE Dunham with attention Vinayak. Letter sent.

## 2025-04-21 NOTE — PROGRESS NOTES
Subjective   Patient ID: Carlos A Boyer Jr. is a 65 y.o. male who is here for follow-up of a known aneurysm.   Chief Complaint   Patient presents with    Aortic Aneurysm     Patient is here for follow up w/CT     History of Present Illness  The patient presents for evaluation of his aortic aneurysm.    He reports no history of vascular procedures or intravenous interventions. He has not consulted with a vascular surgeon and does not have a pacemaker. His last catheterization was prior to his surgery in 2019, which was necessitated by multiple blockages and stent placements. He has been hospitalized for heart failure and other cardiac issues.    He has expressed interest in Inspire therapy as an alternative to CPAP due to feelings of claustrophobia. However, he has not received clearance from any cardiologist for this procedure. He has consulted with Dr. Ibarra twice regarding Inspire therapy, but it was deemed unsuitable for him. He also discussed this option with Sreekanth from Dr. Delgado' office, who promised to consult with Dr. Ramey, but he has not received clearance. He continues to use CPAP intermittently, removing it when he feels uncomfortable.    Supplemental Information  He has significant back issues and is currently undergoing physical therapy. His physical therapist has recommended an MRI.    The following portions of the patient's history were reviewed and updated as appropriate: allergies, current medications, past family history, past medical history, past social history, past surgical history and problem list.       Objective   Physical Exam  Physical Exam  Physical Exam:    General: No acute distress, in good spirits  Cardiovascular: RRR, no murmur, rubs, or gallops.    Pulmonary: Clear to auscultation bilaterally, no wheezing, rubs, or rales.  Abdomen: Soft, nondistended, and nontender.  Extremities: Warm, moves all extremities.  Neurologic:  No focal deficits, CN II-XII intact grossly.          CT  Angiogram Chest  Result Date: 4/9/2025  Narrative: EXAMINATION: CT ANGIOGRAM CHEST-   4/9/2025 8:42 AM  HISTORY: Ascending Aortic Ansurysm; I71.21-Aneurysm of the ascending aorta, without rupture  In order to have a CT radiation dose as low as reasonably achievable Automated Exposure Control was utilized for adjustment of the mA and/or KV according to patient size.  CT Dose DLP = 420.13 mGy.cm. (If there are multiple studies performed at the same time this represents the total dose).  Images are stored in PACS per institutional protocol.  CT angiogram chest with IV contrast injection. CT angiography protocol. CT imaging with bolus IV contrast injection. Under concurrent supervision axial, sagittal, coronal, three-dimensional, and MIP data sets were constructed on an independent work station.  Comparison: 2/14/2024.  Heart size is at the upper limits of normal. Median sternotomy changes and coronary artery calcification. Symmetric and normally opacified pulmonary arteries. Unchanged 14 mm linear metal structure within the LEFT pulmonary artery. No mediastinal mass. Normal CT appearance of the thyroid gland.  Stable dilated ascending thoracic aorta measuring 41 x 45 mm on axial image 66. Stable 34 mm aortic root measurement on coronal image 53. Stable 38 mm diameter of the distal ascending aorta near the innominate artery origin. No dissection.  Moderate degenerative disc and endplate change throughout the thoracic spine. Multiple (approximately 10) tiny scattered lung nodules are unchanged.      Impression: 1. Stable size dilated ascending thoracic aorta. 2. No significant change from last year.  This report was signed and finalized on 4/9/2025 10:32 AM by Dr. Celso Wynn MD.      Results  Imaging  CTA chest performed on 04/09/2025 shows distal ascending aorta measures 3.2 cm in size, ascending aorta measures 4.1 cm in size, aortic root measures 3.7 cm in size on axial imaging, coronal reconstruction measures 3.4  cm in size, sagittal reconstruction reveals a root measurement of 3.7 cm in size. A metallic object is noted within the interlobar left pulmonary artery.    Diagnoses and all orders for this visit:    1. Aneurysm of ascending aorta without rupture (Primary)  -     CT Angiogram Chest; Future          Assessment & Plan     Assessment & Plan  1. 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  The presence of a metallic object in the pulmonary artery was noted on the CTA chest performed on 04/09/2025. This anomaly was first observed on a CT scan dated 07/18/2023, with no such finding on the previous scan from 04/17/2023. The origin of this metallic object remains uncertain, as there have been no vascular procedures or interventions that could have resulted in a foreign body. Given that the object has been present for approximately 18 to 20 months without causing any complications, the current plan is to monitor it through annual CT scans. CXR suggests this could be a cardiomems but he denies any intervention.  If an MRI is required for other health concerns, he should inform the provider about the metallic object.    Follow-up  The patient will follow up in 1 year with Skye Olson.    PROCEDURE  The patient underwent coronary artery bypass grafting in 07/2019.     Many thanks for the opportunity to care for your patient.    I will continue to keep you apprised of provided care as it ensues.            Patient or patient representative verbalized consent for the use of Ambient Listening during the visit with  Steven Tang MD for chart documentation. 4/20/2025  22:53 CDT

## 2025-04-25 ENCOUNTER — HOSPITAL ENCOUNTER (EMERGENCY)
Facility: HOSPITAL | Age: 65
Discharge: HOME | DRG: 040 | End: 2025-04-26
Attending: STUDENT IN AN ORGANIZED HEALTH CARE EDUCATION/TRAINING PROGRAM
Payer: OTHER GOVERNMENT

## 2025-04-25 DIAGNOSIS — R56.9 SEIZURE: Primary | ICD-10-CM

## 2025-04-25 PROCEDURE — 82077 ASSAY SPEC XCP UR&BREATH IA: CPT | Performed by: STUDENT IN AN ORGANIZED HEALTH CARE EDUCATION/TRAINING PROGRAM

## 2025-04-25 PROCEDURE — 99284 EMERGENCY DEPT VISIT MOD MDM: CPT

## 2025-04-25 PROCEDURE — 84100 ASSAY OF PHOSPHORUS: CPT | Performed by: STUDENT IN AN ORGANIZED HEALTH CARE EDUCATION/TRAINING PROGRAM

## 2025-04-25 PROCEDURE — 85025 COMPLETE CBC W/AUTO DIFF WBC: CPT | Performed by: STUDENT IN AN ORGANIZED HEALTH CARE EDUCATION/TRAINING PROGRAM

## 2025-04-25 PROCEDURE — 93005 ELECTROCARDIOGRAM TRACING: CPT | Performed by: STUDENT IN AN ORGANIZED HEALTH CARE EDUCATION/TRAINING PROGRAM

## 2025-04-25 PROCEDURE — 83735 ASSAY OF MAGNESIUM: CPT | Performed by: STUDENT IN AN ORGANIZED HEALTH CARE EDUCATION/TRAINING PROGRAM

## 2025-04-25 PROCEDURE — 80053 COMPREHEN METABOLIC PANEL: CPT | Performed by: STUDENT IN AN ORGANIZED HEALTH CARE EDUCATION/TRAINING PROGRAM

## 2025-04-25 PROCEDURE — 93010 ELECTROCARDIOGRAM REPORT: CPT | Performed by: INTERNAL MEDICINE

## 2025-04-26 ENCOUNTER — APPOINTMENT (OUTPATIENT)
Dept: CT IMAGING | Facility: HOSPITAL | Age: 65
End: 2025-04-26
Payer: OTHER GOVERNMENT

## 2025-04-26 ENCOUNTER — HOSPITAL ENCOUNTER (INPATIENT)
Facility: HOSPITAL | Age: 65
LOS: 6 days | Discharge: HOME | End: 2025-05-02
Attending: EMERGENCY MEDICINE | Admitting: STUDENT IN AN ORGANIZED HEALTH CARE EDUCATION/TRAINING PROGRAM
Payer: OTHER GOVERNMENT

## 2025-04-26 ENCOUNTER — APPOINTMENT (OUTPATIENT)
Dept: GENERAL RADIOLOGY | Facility: HOSPITAL | Age: 65
DRG: 040 | End: 2025-04-26
Payer: OTHER GOVERNMENT

## 2025-04-26 VITALS
BODY MASS INDEX: 33.23 KG/M2 | HEIGHT: 72 IN | DIASTOLIC BLOOD PRESSURE: 62 MMHG | WEIGHT: 245.3 LBS | RESPIRATION RATE: 22 BRPM | SYSTOLIC BLOOD PRESSURE: 120 MMHG | OXYGEN SATURATION: 93 % | HEART RATE: 82 BPM | TEMPERATURE: 98.2 F

## 2025-04-26 DIAGNOSIS — Z74.09 IMPAIRED MOBILITY: ICD-10-CM

## 2025-04-26 DIAGNOSIS — Z79.4 TYPE 2 DIABETES MELLITUS WITH OTHER CIRCULATORY COMPLICATION, WITH LONG-TERM CURRENT USE OF INSULIN: ICD-10-CM

## 2025-04-26 DIAGNOSIS — Z96.9 RETAINED ORTHOPEDIC HARDWARE: ICD-10-CM

## 2025-04-26 DIAGNOSIS — E11.59 TYPE 2 DIABETES MELLITUS WITH OTHER CIRCULATORY COMPLICATION, WITH LONG-TERM CURRENT USE OF INSULIN: ICD-10-CM

## 2025-04-26 DIAGNOSIS — S31.000A WOUND OF SACRAL REGION, INITIAL ENCOUNTER: ICD-10-CM

## 2025-04-26 DIAGNOSIS — A48.0 GAS GANGRENE OF FOOT: ICD-10-CM

## 2025-04-26 DIAGNOSIS — L03.031 CELLULITIS OF RIGHT TOE: ICD-10-CM

## 2025-04-26 DIAGNOSIS — G40.909 RECURRENT SEIZURES: Primary | ICD-10-CM

## 2025-04-26 LAB
ALBUMIN SERPL-MCNC: 4.2 G/DL (ref 3.5–5.2)
ALBUMIN SERPL-MCNC: 4.3 G/DL (ref 3.5–5.2)
ALBUMIN/GLOB SERPL: 1.4 G/DL
ALBUMIN/GLOB SERPL: 1.5 G/DL
ALP SERPL-CCNC: 64 U/L (ref 39–117)
ALP SERPL-CCNC: 90 U/L (ref 39–117)
ALT SERPL W P-5'-P-CCNC: 19 U/L (ref 1–41)
ALT SERPL W P-5'-P-CCNC: 25 U/L (ref 1–41)
AMPHET+METHAMPHET UR QL: NEGATIVE
AMPHETAMINES UR QL: NEGATIVE
ANION GAP SERPL CALCULATED.3IONS-SCNC: 13 MMOL/L (ref 5–15)
ANION GAP SERPL CALCULATED.3IONS-SCNC: 15 MMOL/L (ref 5–15)
AST SERPL-CCNC: 18 U/L (ref 1–40)
AST SERPL-CCNC: 31 U/L (ref 1–40)
B PARAPERT DNA SPEC QL NAA+PROBE: NOT DETECTED
B PERT DNA SPEC QL NAA+PROBE: NOT DETECTED
BACTERIA UR QL AUTO: NORMAL /HPF
BARBITURATES UR QL SCN: NEGATIVE
BASOPHILS # BLD AUTO: 0.02 10*3/MM3 (ref 0–0.2)
BASOPHILS # BLD AUTO: 0.02 10*3/MM3 (ref 0–0.2)
BASOPHILS NFR BLD AUTO: 0.3 % (ref 0–1.5)
BASOPHILS NFR BLD AUTO: 0.5 % (ref 0–1.5)
BENZODIAZ UR QL SCN: NEGATIVE
BILIRUB SERPL-MCNC: 1.2 MG/DL (ref 0–1.2)
BILIRUB SERPL-MCNC: 1.6 MG/DL (ref 0–1.2)
BILIRUB UR QL STRIP: NEGATIVE
BUN SERPL-MCNC: 15 MG/DL (ref 8–23)
BUN SERPL-MCNC: 18 MG/DL (ref 8–23)
BUN/CREAT SERPL: 13.6 (ref 7–25)
BUN/CREAT SERPL: 16.4 (ref 7–25)
BUPRENORPHINE SERPL-MCNC: NEGATIVE NG/ML
C PNEUM DNA NPH QL NAA+NON-PROBE: NOT DETECTED
CALCIUM SPEC-SCNC: 9.1 MG/DL (ref 8.6–10.5)
CALCIUM SPEC-SCNC: 9.3 MG/DL (ref 8.6–10.5)
CANNABINOIDS SERPL QL: NEGATIVE
CHLORIDE SERPL-SCNC: 100 MMOL/L (ref 98–107)
CHLORIDE SERPL-SCNC: 102 MMOL/L (ref 98–107)
CLARITY UR: CLEAR
CO2 SERPL-SCNC: 20 MMOL/L (ref 22–29)
CO2 SERPL-SCNC: 23 MMOL/L (ref 22–29)
COCAINE UR QL: NEGATIVE
COLOR UR: YELLOW
CREAT SERPL-MCNC: 1.1 MG/DL (ref 0.76–1.27)
CREAT SERPL-MCNC: 1.1 MG/DL (ref 0.76–1.27)
DEPRECATED RDW RBC AUTO: 43.4 FL (ref 37–54)
DEPRECATED RDW RBC AUTO: 46.4 FL (ref 37–54)
EGFRCR SERPLBLD CKD-EPI 2021: 73.6 ML/MIN/1.73
EGFRCR SERPLBLD CKD-EPI 2021: 73.6 ML/MIN/1.73
EOSINOPHIL # BLD AUTO: 0.01 10*3/MM3 (ref 0–0.4)
EOSINOPHIL # BLD AUTO: 0.09 10*3/MM3 (ref 0–0.4)
EOSINOPHIL NFR BLD AUTO: 0.1 % (ref 0.3–6.2)
EOSINOPHIL NFR BLD AUTO: 2.4 % (ref 0.3–6.2)
ERYTHROCYTE [DISTWIDTH] IN BLOOD BY AUTOMATED COUNT: 13.2 % (ref 12.3–15.4)
ERYTHROCYTE [DISTWIDTH] IN BLOOD BY AUTOMATED COUNT: 13.6 % (ref 12.3–15.4)
ETHANOL UR QL: <0.01 %
FENTANYL UR-MCNC: NEGATIVE NG/ML
FLUAV SUBTYP SPEC NAA+PROBE: NOT DETECTED
FLUBV RNA ISLT QL NAA+PROBE: NOT DETECTED
GLOBULIN UR ELPH-MCNC: 2.8 GM/DL
GLOBULIN UR ELPH-MCNC: 2.9 GM/DL
GLUCOSE BLDC GLUCOMTR-MCNC: 195 MG/DL (ref 70–130)
GLUCOSE SERPL-MCNC: 164 MG/DL (ref 65–99)
GLUCOSE SERPL-MCNC: 209 MG/DL (ref 65–99)
GLUCOSE UR STRIP-MCNC: ABNORMAL MG/DL
HADV DNA SPEC NAA+PROBE: NOT DETECTED
HCOV 229E RNA SPEC QL NAA+PROBE: NOT DETECTED
HCOV HKU1 RNA SPEC QL NAA+PROBE: NOT DETECTED
HCOV NL63 RNA SPEC QL NAA+PROBE: NOT DETECTED
HCOV OC43 RNA SPEC QL NAA+PROBE: NOT DETECTED
HCT VFR BLD AUTO: 43.9 % (ref 37.5–51)
HCT VFR BLD AUTO: 44.1 % (ref 37.5–51)
HGB BLD-MCNC: 14.5 G/DL (ref 13–17.7)
HGB BLD-MCNC: 14.8 G/DL (ref 13–17.7)
HGB UR QL STRIP.AUTO: NEGATIVE
HMPV RNA NPH QL NAA+NON-PROBE: NOT DETECTED
HOLD SPECIMEN: NORMAL
HPIV1 RNA ISLT QL NAA+PROBE: NOT DETECTED
HPIV2 RNA SPEC QL NAA+PROBE: NOT DETECTED
HPIV3 RNA NPH QL NAA+PROBE: NOT DETECTED
HPIV4 P GENE NPH QL NAA+PROBE: NOT DETECTED
HYALINE CASTS UR QL AUTO: NORMAL /LPF
IMM GRANULOCYTES # BLD AUTO: 0.01 10*3/MM3 (ref 0–0.05)
IMM GRANULOCYTES # BLD AUTO: 0.03 10*3/MM3 (ref 0–0.05)
IMM GRANULOCYTES NFR BLD AUTO: 0.3 % (ref 0–0.5)
IMM GRANULOCYTES NFR BLD AUTO: 0.4 % (ref 0–0.5)
KETONES UR QL STRIP: ABNORMAL
LEUKOCYTE ESTERASE UR QL STRIP.AUTO: NEGATIVE
LYMPHOCYTES # BLD AUTO: 0.32 10*3/MM3 (ref 0.7–3.1)
LYMPHOCYTES # BLD AUTO: 0.44 10*3/MM3 (ref 0.7–3.1)
LYMPHOCYTES NFR BLD AUTO: 5.6 % (ref 19.6–45.3)
LYMPHOCYTES NFR BLD AUTO: 8.6 % (ref 19.6–45.3)
M PNEUMO IGG SER IA-ACNC: NOT DETECTED
MAGNESIUM SERPL-MCNC: 1.8 MG/DL (ref 1.6–2.4)
MAGNESIUM SERPL-MCNC: 1.8 MG/DL (ref 1.6–2.4)
MCH RBC QN AUTO: 30.3 PG (ref 26.6–33)
MCH RBC QN AUTO: 30.3 PG (ref 26.6–33)
MCHC RBC AUTO-ENTMCNC: 33 G/DL (ref 31.5–35.7)
MCHC RBC AUTO-ENTMCNC: 33.6 G/DL (ref 31.5–35.7)
MCV RBC AUTO: 90.4 FL (ref 79–97)
MCV RBC AUTO: 91.8 FL (ref 79–97)
METHADONE UR QL SCN: NEGATIVE
MONOCYTES # BLD AUTO: 0.05 10*3/MM3 (ref 0.1–0.9)
MONOCYTES # BLD AUTO: 0.32 10*3/MM3 (ref 0.1–0.9)
MONOCYTES NFR BLD AUTO: 1.4 % (ref 5–12)
MONOCYTES NFR BLD AUTO: 4.1 % (ref 5–12)
NEUTROPHILS NFR BLD AUTO: 3.21 10*3/MM3 (ref 1.7–7)
NEUTROPHILS NFR BLD AUTO: 7.01 10*3/MM3 (ref 1.7–7)
NEUTROPHILS NFR BLD AUTO: 86.8 % (ref 42.7–76)
NEUTROPHILS NFR BLD AUTO: 89.5 % (ref 42.7–76)
NITRITE UR QL STRIP: NEGATIVE
NRBC BLD AUTO-RTO: 0 /100 WBC (ref 0–0.2)
OPIATES UR QL: POSITIVE
OXYCODONE UR QL SCN: NEGATIVE
PCP UR QL SCN: NEGATIVE
PH UR STRIP.AUTO: 7 [PH] (ref 5–8)
PHOSPHATE SERPL-MCNC: 2.4 MG/DL (ref 2.5–4.5)
PLATELET # BLD AUTO: 105 10*3/MM3 (ref 140–450)
PLATELET # BLD AUTO: 87 10*3/MM3 (ref 140–450)
PMV BLD AUTO: 10.7 FL (ref 6–12)
PMV BLD AUTO: 11 FL (ref 6–12)
POTASSIUM SERPL-SCNC: 4.5 MMOL/L (ref 3.5–5.2)
POTASSIUM SERPL-SCNC: 5 MMOL/L (ref 3.5–5.2)
PROT SERPL-MCNC: 7.1 G/DL (ref 6–8.5)
PROT SERPL-MCNC: 7.1 G/DL (ref 6–8.5)
PROT UR QL STRIP: ABNORMAL
RBC # BLD AUTO: 4.78 10*6/MM3 (ref 4.14–5.8)
RBC # BLD AUTO: 4.88 10*6/MM3 (ref 4.14–5.8)
RBC # UR STRIP: NORMAL /HPF
REF LAB TEST METHOD: NORMAL
RHINOVIRUS RNA SPEC NAA+PROBE: NOT DETECTED
RSV RNA NPH QL NAA+NON-PROBE: NOT DETECTED
SARS-COV-2 RNA RESP QL NAA+PROBE: NOT DETECTED
SODIUM SERPL-SCNC: 136 MMOL/L (ref 136–145)
SODIUM SERPL-SCNC: 137 MMOL/L (ref 136–145)
SP GR UR STRIP: 1.03 (ref 1–1.03)
SQUAMOUS #/AREA URNS HPF: NORMAL /HPF
TRICYCLICS UR QL SCN: NEGATIVE
UROBILINOGEN UR QL STRIP: ABNORMAL
WBC # UR STRIP: NORMAL /HPF
WBC NRBC COR # BLD AUTO: 3.7 10*3/MM3 (ref 3.4–10.8)
WBC NRBC COR # BLD AUTO: 7.83 10*3/MM3 (ref 3.4–10.8)
WHOLE BLOOD HOLD COAG: NORMAL
WHOLE BLOOD HOLD SPECIMEN: NORMAL

## 2025-04-26 PROCEDURE — 99285 EMERGENCY DEPT VISIT HI MDM: CPT

## 2025-04-26 PROCEDURE — 81001 URINALYSIS AUTO W/SCOPE: CPT | Performed by: STUDENT IN AN ORGANIZED HEALTH CARE EDUCATION/TRAINING PROGRAM

## 2025-04-26 PROCEDURE — 80177 DRUG SCRN QUAN LEVETIRACETAM: CPT | Performed by: EMERGENCY MEDICINE

## 2025-04-26 PROCEDURE — 93010 ELECTROCARDIOGRAM REPORT: CPT | Performed by: HOSPITALIST

## 2025-04-26 PROCEDURE — 93005 ELECTROCARDIOGRAM TRACING: CPT

## 2025-04-26 PROCEDURE — 93005 ELECTROCARDIOGRAM TRACING: CPT | Performed by: EMERGENCY MEDICINE

## 2025-04-26 PROCEDURE — 70450 CT HEAD/BRAIN W/O DYE: CPT

## 2025-04-26 PROCEDURE — 80175 DRUG SCREEN QUAN LAMOTRIGINE: CPT | Performed by: CLINICAL NURSE SPECIALIST

## 2025-04-26 PROCEDURE — 82948 REAGENT STRIP/BLOOD GLUCOSE: CPT

## 2025-04-26 PROCEDURE — 63710000001 INSULIN LISPRO (HUMAN) PER 5 UNITS: Performed by: FAMILY MEDICINE

## 2025-04-26 PROCEDURE — 71045 X-RAY EXAM CHEST 1 VIEW: CPT

## 2025-04-26 PROCEDURE — 25810000003 SODIUM CHLORIDE 0.9 % SOLUTION: Performed by: STUDENT IN AN ORGANIZED HEALTH CARE EDUCATION/TRAINING PROGRAM

## 2025-04-26 PROCEDURE — 36415 COLL VENOUS BLD VENIPUNCTURE: CPT

## 2025-04-26 PROCEDURE — 83735 ASSAY OF MAGNESIUM: CPT | Performed by: EMERGENCY MEDICINE

## 2025-04-26 PROCEDURE — 80053 COMPREHEN METABOLIC PANEL: CPT | Performed by: EMERGENCY MEDICINE

## 2025-04-26 PROCEDURE — 63710000001 INSULIN GLARGINE PER 5 UNITS: Performed by: FAMILY MEDICINE

## 2025-04-26 PROCEDURE — 25010000002 LEVETRIRACETAM PER 10 MG: Performed by: EMERGENCY MEDICINE

## 2025-04-26 PROCEDURE — 0202U NFCT DS 22 TRGT SARS-COV-2: CPT | Performed by: STUDENT IN AN ORGANIZED HEALTH CARE EDUCATION/TRAINING PROGRAM

## 2025-04-26 PROCEDURE — 80307 DRUG TEST PRSMV CHEM ANLYZR: CPT | Performed by: STUDENT IN AN ORGANIZED HEALTH CARE EDUCATION/TRAINING PROGRAM

## 2025-04-26 PROCEDURE — 25010000002 CEFTRIAXONE PER 250 MG: Performed by: FAMILY MEDICINE

## 2025-04-26 PROCEDURE — 85025 COMPLETE CBC W/AUTO DIFF WBC: CPT | Performed by: EMERGENCY MEDICINE

## 2025-04-26 RX ORDER — LEVETIRACETAM 500 MG/1
1500 TABLET ORAL DAILY
Status: DISCONTINUED | OUTPATIENT
Start: 2025-04-27 | End: 2025-05-02 | Stop reason: HOSPADM

## 2025-04-26 RX ORDER — GUAIFENESIN 600 MG/1
1200 TABLET, EXTENDED RELEASE ORAL DAILY PRN
Status: DISCONTINUED | OUTPATIENT
Start: 2025-04-26 | End: 2025-05-02 | Stop reason: HOSPADM

## 2025-04-26 RX ORDER — LORAZEPAM 2 MG/ML
1 INJECTION INTRAMUSCULAR
Status: ACTIVE | OUTPATIENT
Start: 2025-04-26 | End: 2025-05-01

## 2025-04-26 RX ORDER — IBUPROFEN 600 MG/1
1 TABLET ORAL
Status: DISCONTINUED | OUTPATIENT
Start: 2025-04-26 | End: 2025-05-02 | Stop reason: HOSPADM

## 2025-04-26 RX ORDER — BISACODYL 10 MG
10 SUPPOSITORY, RECTAL RECTAL DAILY PRN
Status: DISCONTINUED | OUTPATIENT
Start: 2025-04-26 | End: 2025-05-02 | Stop reason: HOSPADM

## 2025-04-26 RX ORDER — SODIUM CHLORIDE 9 MG/ML
40 INJECTION, SOLUTION INTRAVENOUS AS NEEDED
Status: DISCONTINUED | OUTPATIENT
Start: 2025-04-26 | End: 2025-05-02 | Stop reason: HOSPADM

## 2025-04-26 RX ORDER — PANTOPRAZOLE SODIUM 40 MG/1
40 TABLET, DELAYED RELEASE ORAL 2 TIMES DAILY
Status: DISCONTINUED | OUTPATIENT
Start: 2025-04-26 | End: 2025-05-02 | Stop reason: HOSPADM

## 2025-04-26 RX ORDER — POLYETHYLENE GLYCOL 3350 17 G/17G
17 POWDER, FOR SOLUTION ORAL DAILY
Status: DISCONTINUED | OUTPATIENT
Start: 2025-04-26 | End: 2025-05-02 | Stop reason: HOSPADM

## 2025-04-26 RX ORDER — FUROSEMIDE 40 MG/1
40 TABLET ORAL DAILY PRN
Status: DISCONTINUED | OUTPATIENT
Start: 2025-04-26 | End: 2025-05-02 | Stop reason: HOSPADM

## 2025-04-26 RX ORDER — METOPROLOL TARTRATE 100 MG/1
100 TABLET ORAL 2 TIMES DAILY
Status: DISCONTINUED | OUTPATIENT
Start: 2025-04-26 | End: 2025-05-02 | Stop reason: HOSPADM

## 2025-04-26 RX ORDER — ALBUTEROL SULFATE 0.83 MG/ML
2.5 SOLUTION RESPIRATORY (INHALATION) EVERY 6 HOURS PRN
Status: DISCONTINUED | OUTPATIENT
Start: 2025-04-26 | End: 2025-05-02 | Stop reason: HOSPADM

## 2025-04-26 RX ORDER — TAMSULOSIN HYDROCHLORIDE 0.4 MG/1
0.4 CAPSULE ORAL DAILY
Status: DISCONTINUED | OUTPATIENT
Start: 2025-04-27 | End: 2025-05-02 | Stop reason: HOSPADM

## 2025-04-26 RX ORDER — ONDANSETRON 2 MG/ML
4 INJECTION INTRAMUSCULAR; INTRAVENOUS EVERY 6 HOURS PRN
Status: DISCONTINUED | OUTPATIENT
Start: 2025-04-26 | End: 2025-05-02 | Stop reason: HOSPADM

## 2025-04-26 RX ORDER — BISACODYL 5 MG/1
5 TABLET, DELAYED RELEASE ORAL DAILY PRN
Status: DISCONTINUED | OUTPATIENT
Start: 2025-04-26 | End: 2025-05-02 | Stop reason: HOSPADM

## 2025-04-26 RX ORDER — ALBUTEROL SULFATE 90 UG/1
2 INHALANT RESPIRATORY (INHALATION) EVERY 6 HOURS PRN
Status: DISCONTINUED | OUTPATIENT
Start: 2025-04-26 | End: 2025-04-26 | Stop reason: CLARIF

## 2025-04-26 RX ORDER — AMOXICILLIN 250 MG
2 CAPSULE ORAL 2 TIMES DAILY PRN
Status: DISCONTINUED | OUTPATIENT
Start: 2025-04-26 | End: 2025-05-02 | Stop reason: HOSPADM

## 2025-04-26 RX ORDER — NITROGLYCERIN 0.4 MG/1
0.4 TABLET SUBLINGUAL
Status: DISCONTINUED | OUTPATIENT
Start: 2025-04-26 | End: 2025-05-02 | Stop reason: HOSPADM

## 2025-04-26 RX ORDER — ACETAMINOPHEN 325 MG/1
325 TABLET ORAL EVERY 6 HOURS PRN
Status: DISCONTINUED | OUTPATIENT
Start: 2025-04-26 | End: 2025-05-02 | Stop reason: HOSPADM

## 2025-04-26 RX ORDER — POLYETHYLENE GLYCOL 3350 17 G/17G
17 POWDER, FOR SOLUTION ORAL DAILY PRN
Status: DISCONTINUED | OUTPATIENT
Start: 2025-04-26 | End: 2025-05-02 | Stop reason: HOSPADM

## 2025-04-26 RX ORDER — ACETAMINOPHEN 500 MG
1000 TABLET ORAL ONCE
Status: COMPLETED | OUTPATIENT
Start: 2025-04-26 | End: 2025-04-26

## 2025-04-26 RX ORDER — INSULIN LISPRO 100 [IU]/ML
2-9 INJECTION, SOLUTION INTRAVENOUS; SUBCUTANEOUS
Status: DISCONTINUED | OUTPATIENT
Start: 2025-04-26 | End: 2025-04-30

## 2025-04-26 RX ORDER — FLUTICASONE PROPIONATE 50 MCG
2 SPRAY, SUSPENSION (ML) NASAL DAILY
Status: DISCONTINUED | OUTPATIENT
Start: 2025-04-27 | End: 2025-05-02 | Stop reason: HOSPADM

## 2025-04-26 RX ORDER — LEVETIRACETAM 500 MG/5ML
1000 INJECTION, SOLUTION, CONCENTRATE INTRAVENOUS ONCE
Status: COMPLETED | OUTPATIENT
Start: 2025-04-26 | End: 2025-04-26

## 2025-04-26 RX ORDER — SODIUM CHLORIDE 0.9 % (FLUSH) 0.9 %
10 SYRINGE (ML) INJECTION EVERY 12 HOURS SCHEDULED
Status: DISCONTINUED | OUTPATIENT
Start: 2025-04-26 | End: 2025-05-02 | Stop reason: HOSPADM

## 2025-04-26 RX ORDER — LEVETIRACETAM 500 MG/1
2000 TABLET ORAL NIGHTLY
Status: DISCONTINUED | OUTPATIENT
Start: 2025-04-26 | End: 2025-05-02 | Stop reason: HOSPADM

## 2025-04-26 RX ORDER — ISOSORBIDE MONONITRATE 30 MG/1
120 TABLET, EXTENDED RELEASE ORAL DAILY
Status: DISCONTINUED | OUTPATIENT
Start: 2025-04-27 | End: 2025-05-02 | Stop reason: HOSPADM

## 2025-04-26 RX ORDER — LAMOTRIGINE 100 MG/1
200 TABLET ORAL 2 TIMES DAILY
Status: DISCONTINUED | OUTPATIENT
Start: 2025-04-26 | End: 2025-05-02 | Stop reason: HOSPADM

## 2025-04-26 RX ORDER — ROSUVASTATIN CALCIUM 20 MG/1
20 TABLET, COATED ORAL NIGHTLY
Status: DISCONTINUED | OUTPATIENT
Start: 2025-04-26 | End: 2025-05-02 | Stop reason: HOSPADM

## 2025-04-26 RX ORDER — SODIUM CHLORIDE 0.9 % (FLUSH) 0.9 %
10 SYRINGE (ML) INJECTION AS NEEDED
Status: DISCONTINUED | OUTPATIENT
Start: 2025-04-26 | End: 2025-05-02 | Stop reason: HOSPADM

## 2025-04-26 RX ORDER — GABAPENTIN 300 MG/1
300 CAPSULE ORAL NIGHTLY
Status: DISCONTINUED | OUTPATIENT
Start: 2025-04-26 | End: 2025-05-02 | Stop reason: HOSPADM

## 2025-04-26 RX ORDER — DOCUSATE SODIUM 100 MG/1
100 CAPSULE, LIQUID FILLED ORAL DAILY PRN
Status: DISCONTINUED | OUTPATIENT
Start: 2025-04-26 | End: 2025-05-02 | Stop reason: HOSPADM

## 2025-04-26 RX ORDER — NICOTINE POLACRILEX 4 MG
15 LOZENGE BUCCAL
Status: DISCONTINUED | OUTPATIENT
Start: 2025-04-26 | End: 2025-05-02 | Stop reason: HOSPADM

## 2025-04-26 RX ORDER — ASPIRIN 81 MG/1
81 TABLET ORAL DAILY
Status: DISCONTINUED | OUTPATIENT
Start: 2025-04-27 | End: 2025-05-02 | Stop reason: HOSPADM

## 2025-04-26 RX ORDER — DEXTROSE MONOHYDRATE 25 G/50ML
25 INJECTION, SOLUTION INTRAVENOUS
Status: DISCONTINUED | OUTPATIENT
Start: 2025-04-26 | End: 2025-05-02 | Stop reason: HOSPADM

## 2025-04-26 RX ADMIN — ACETAMINOPHEN 1000 MG: 500 TABLET, FILM COATED ORAL at 00:45

## 2025-04-26 RX ADMIN — SODIUM CHLORIDE 1000 MG: 900 INJECTION INTRAVENOUS at 20:40

## 2025-04-26 RX ADMIN — PANTOPRAZOLE SODIUM 40 MG: 40 TABLET, DELAYED RELEASE ORAL at 20:39

## 2025-04-26 RX ADMIN — APIXABAN 5 MG: 5 TABLET, FILM COATED ORAL at 20:39

## 2025-04-26 RX ADMIN — METOPROLOL TARTRATE 100 MG: 100 TABLET, FILM COATED ORAL at 20:39

## 2025-04-26 RX ADMIN — POLYETHYLENE GLYCOL 3350 17 G: 17 POWDER, FOR SOLUTION ORAL at 20:38

## 2025-04-26 RX ADMIN — SODIUM CHLORIDE 1000 ML: 9 INJECTION, SOLUTION INTRAVENOUS at 00:45

## 2025-04-26 RX ADMIN — INSULIN LISPRO 2 UNITS: 100 INJECTION, SOLUTION INTRAVENOUS; SUBCUTANEOUS at 20:52

## 2025-04-26 RX ADMIN — Medication 10 ML: at 21:03

## 2025-04-26 RX ADMIN — LEVETIRACETAM 2000 MG: 500 TABLET, FILM COATED ORAL at 20:39

## 2025-04-26 RX ADMIN — LEVETIRACETAM 1000 MG: 100 INJECTION, SOLUTION INTRAVENOUS at 15:59

## 2025-04-26 RX ADMIN — GABAPENTIN 300 MG: 300 CAPSULE ORAL at 20:40

## 2025-04-26 RX ADMIN — LAMOTRIGINE 200 MG: 100 TABLET ORAL at 20:39

## 2025-04-26 RX ADMIN — SACUBITRIL AND VALSARTAN 1 TABLET: 97; 103 TABLET, FILM COATED ORAL at 20:39

## 2025-04-26 RX ADMIN — ACETAMINOPHEN 325 MG: 325 TABLET, FILM COATED ORAL at 20:38

## 2025-04-26 RX ADMIN — INSULIN GLARGINE 25 UNITS: 100 INJECTION, SOLUTION SUBCUTANEOUS at 20:41

## 2025-04-26 RX ADMIN — ROSUVASTATIN CALCIUM 20 MG: 20 TABLET, FILM COATED ORAL at 20:39

## 2025-04-26 NOTE — Clinical Note
Level of Care: Telemetry [5]   Diagnosis: Recurrent seizures [[id number]]   Admitting Physician: JIMBO DAVILA [[id number]]   Attending Physician: JIMBO DAVILA [[id number]]   Certification: I Certify That Inpatient Hospital Services Are Medically Necessary For Greater Than 2 Midnights

## 2025-04-26 NOTE — ED PROVIDER NOTES
Subjective   History of Present Illness  The patient is a 65-year-old male who presents to the ED after experiencing a seizure at home, prompting his wife to call EMS. He reports having focal seizures where he 'spaces out. The patient has a known seizure disorder and takes Keppra and lamotrigine for management. Today's episode was described as more severe than usual. He denies any tongue biting, urinary or fecal incontinence during the event. Past medical history is significant for diabetes, hypertension, cardiac history with previous stents, and he is on Eliquis and baby aspirin for anticoagulation. The patient reports compliance with his medications, including his antiepileptics. He last saw his neurologist approximately one month ago. Of note, the patient has a temperature of 100.5°F but denies cough or other symptoms.        Review of Systems   All other systems reviewed and are negative.      Past Medical History:   Diagnosis Date    Arthritis     Asthma     Cancer     skin    Carotid disease, bilateral     Cellulitis     right foot - on antibiotics 6-    Chest pain     Chronic diastolic congestive heart failure 09/07/2019    Chronic sinusitis     Maribell bullosa     Coronary artery disease involving native coronary artery of native heart with unstable angina pectoris 01/17/2017    Deviated septum     Diabetes mellitus     Difficulty urinating     Diverticulitis     Enlarged prostate     Fatty liver     GERD (gastroesophageal reflux disease)     [place] (hard of hearing)     Does have hearing aids    Hyperlipidemia LDL goal <70 02/02/2017    Hypertrophy of nasal turbinates     Keratoderma     Kidney stone     Lung nodules     lower right lung    Migraine     Murmur, heart     Myocardial infarction     Obesity     Paroxysmal atrial fibrillation 07/11/2019    Personal history of COVID-19 07/2021    PONV (postoperative nausea and vomiting)     Primary hypertension 10/16/2016    Psoriasis     Seizures     Sinus  congestion     Skin cancer     Sleep apnea     not using cpap    SOB (shortness of breath)     Stroke     mild weakness on right side    UTI (urinary tract infection)        Allergies   Allergen Reactions    Flagyl [Metronidazole] Hives    Atorvastatin Other (See Comments) and Myalgia      - LIPITOR -   Muscle cramps    Ciprofloxacin Hives      - CIPRO -        Past Surgical History:   Procedure Laterality Date    CARDIAC CATHETERIZATION  01/2016    Dr. Broadbent; widely patent previously placed stents in the left anterior descending and obstructive disease involving the diagonal branch which was treated medically    CARDIAC CATHETERIZATION N/A 07/14/2017    Procedure: Left Heart Cath;  Surgeon: Wade Ramey MD;  Location:  [facility]ATH [facility];  Service:     CARDIAC CATHETERIZATION Left 10/15/2018    Procedure: Cardiac Catheterization/Vascular Study;  Surgeon: Wade Ramey MD;  Location:  [facility];  Service: Cardiology    CARDIAC CATHETERIZATION  10/15/2018    Procedure: Functional Flow [place];  Surgeon: Wade Ramey MD;  Location:  [facility];  Service: Cardiology    CARDIAC CATHETERIZATION N/A 10/15/2018    Procedure: Left ventriculography;  Surgeon: Wade Ramey MD;  Location:  [facility];  Service: Cardiology    CARDIAC CATHETERIZATION Left 06/26/2019    Procedure: Cardiac Catheterization/Vascular Study VEL OK  HE WILL WAIT 1 YEAR FOR SHOULDER SURGERY ;  Surgeon: Wade Ramey MD;  Location:  [facility];  Service: Cardiology    CARDIAC CATHETERIZATION Left 04/30/2021    Procedure: Coronary angiography;  Surgeon: Sahil Llamas MD;  Location:  [facility];  Service: Cardiology;  Laterality: Left;    CARDIAC CATHETERIZATION N/A 04/30/2021    Procedure: Percutaneous Coronary Intervention;  Surgeon: Sahil Llamas MD;  Location:  [facility];  Service: Cardiology;  Laterality: N/A;    CARDIAC  CATHETERIZATION N/A 11/09/2022    Procedure: Left Heart Cath with SVGs;  Surgeon: Wade Ramey MD;  Location: [facility] CATH INVASIVE LOCATION;  Service: Cardiology;  Laterality: N/A;    CARPAL TUNNEL RELEASE      January 2024 and pther hand February 2024    CHOLECYSTECTOMY      CHOLECYSTECTOMY WITH INTRAOPERATIVE CHOLANGIOGRAM N/A 08/01/2018    Procedure: CHOLECYSTECTOMY LAPAROSCOPIC INTRAOPERATIVE CHOLANGIOGRAM;  Surgeon: Shane Ann MD;  Location: [facility] OR;  Service: General    COLONOSCOPY N/A 07/14/2020    Procedure: COLONOSCOPY WITH ANESTHESIA;  Surgeon: Anupam Morales DO;  Location: [facility] ENDOSCOPY;  Service: Gastroenterology;  Laterality: N/A;  pre: abdominal pain  post: diverticulosis  Vinayak Correia PA    CORONARY ANGIOPLASTY      CORONARY ARTERY BYPASS GRAFT N/A 07/06/2019    Procedure: CABG X2 WITH LIMA, LEFT LEG OVH, AND PLACEMENT OF LEFT FEMORAL ARTERIAL LINE;  Surgeon: Steven Tang MD;  Location: [facility] OR;  Service: Cardiothoracic    CORONARY STENT PLACEMENT      x 6    CYSTOSCOPY TRANSURETHRAL RESECTION OF PROSTATE N/A 01/23/2023    Procedure: CYSTOSCOPY TRANSURETHRAL RESECTION OF PROSTATE;  Surgeon: Latrell Pope MD;  Location: [facility] OR;  Service: Urology;  Laterality: N/A;    ENDOSCOPIC FUNCTIONAL SINUS SURGERY (FESS) Bilateral 12/13/2017    Procedure: PROCEDURE PERFORMED:  Bilateral functional endoscopic anterior ethmoidectomy with bilateral middle meatal antrostomy Septoplasty Right kathia bullosa resection Bilateral inferior turbinate reduction via Coblation;  Surgeon: Mayank Ibarra MD;  Location: [facility] OR;  Service:     ENDOSCOPY N/A 07/30/2018    Procedure: ESOPHAGOGASTRODUODENOSCOPY WITH ANESTHESIA;  Surgeon: Benitez Mas MD;  Location: [facility] ENDOSCOPY;  Service: Gastroenterology    ENDOSCOPY N/A 07/14/2020    Procedure: ESOPHAGOGASTRODUODENOSCOPY WITH ANESTHESIA;  Surgeon: Anupam Morales DO;  Location: [facility] ENDOSCOPY;  Service: Gastroenterology;   Laterality: N/A;  pre: abdominal pain  post: esophagitis  Vinayak Correia, PA    HERNIA REPAIR      x2 inguinal area    KIDNEY STONE SURGERY      KNEE ARTHROSCOPY Right 2022    Procedure: RIGHT KNEE PARTIAL LATERAL MENISCECTOMY;  Surgeon: Pedro Pablo Song MD;  Location:  [facility] OR;  Service: Orthopedics;  Laterality: Right;    KNEE SURGERY Right     OTHER SURGICAL HISTORY      urolift    PROSTATE SURGERY      Dr. Badillo -     PULMONARY ARTERY PRESSURE SENSOR IMPLANT N/A 2023    Procedure: PA Pressure Sensor Implant;  Surgeon: Wade Ramey MD;  Location:  [facility]ATH [facility];  Service: Cardiology;  Laterality: N/A;    ROTATOR CUFF REPAIR Right     SLEEP ENDOSCOPY N/A 2023    Procedure: Videosleep endoscopy;  Surgeon: Mayank Ibarra MD;  Location:  [facility] OR;  Service: ENT;  Laterality: N/A;    THUMB AMPUTATION Left     partial    TOE FUSION Right 7/3/2024    Procedure: Hallux Interphalangeal Joint Arthrodesis, Bone Graft [place] - Right Foot;  Surgeon: Hernan Villa DPM;  Location:  [facility] OR;  Service: Podiatry;  Laterality: Right;    TOE SURGERY      great toe       Family History   Problem Relation Age of Onset    Heart disease Father     COPD Mother     Hypertension Mother     Asthma Mother     No Known Problems Sister     Colon cancer Paternal Uncle     Prostate cancer Maternal Grandfather     No Known Problems Sister     Colon cancer Maternal Grandmother     Colon polyps Maternal Grandmother        Social History     Socioeconomic History    Marital status:    Tobacco Use    Smoking status: Former     Current packs/day: 0.00     Average packs/day: 1.5 packs/day for 18.0 years (27.0 ttl pk-yrs)     Types: Cigarettes, Cigars     Start date:      Quit date:      Years since quittin.3     Passive exposure: Past    Smokeless tobacco: Former     Types: Chew     Quit date:    Vaping Use    Vaping status: Never Used   Substance and Sexual  Activity    Alcohol use: No     Comment: 0    Drug use: No    Sexual activity: Defer           Objective   Physical Exam    Constitutional:  General: Patient is not in acute distress.  Appearance: Patient is not diaphoretic.    HENT:  Head: Normocephalic and atraumatic.  Right Ear: External ear normal.  Left Ear: External ear normal.  Nose: Nose normal.    Neck: no stiffness, no kernig or brudzinski sign.     Eyes:  General: No scleral icterus.  Conjunctiva/sclera: Conjunctivae normal.    Cardiovascular:  Rate and Rhythm: Normal rate and regular rhythm.  Heart sounds: No friction rub. **Heart murmur present**    Abd: soft and non tender.     Pulmonary:  Effort: Pulmonary effort is normal. No respiratory distress.  Breath sounds: **Lungs are clear to auscultation**. No stridor. No wheezing.    Musculoskeletal:  General: No deformity.    Skin:  General: Skin is warm and dry. No rashes present. Normal color. Normal turgor.    Neurological:  General: **No focal deficits present**.  Mental Status: **Alert and oriented x3. Negative Kernig and Brudzinski signs. No neck stiffness.**    Procedures           ED Course  ED Course as of 04/26/25 1849   Sat Apr 26, 2025   0015 EKG negative for STEMI.  First-degree heart block present [SG]   0152 PT remains at baseline mental status. No focal deficits. No head trauma. He has known seizures, no indication for CT brain or neck, as seizure history is well known. Chemistry negative for electrolytes abnormalities. WBC count negative for elevation. Phosphorus with non clinical concerning decrease. Mag level and alcohol level WNL. UA negative for concerns for UTI. XR chest wet read negative for acute findings.  [SG]   0158 I reassessed PT and talk to wife, PT remains at baseline mental status, no focal deficits. Wife tells me he had one of two episodes where he was staring off, and had a tonic clonic seizure. However no postictal state, no tongue biting, no fecal or urinary  incontinence, differential diagnosis include focal seizure. PT is to follow up with his neurology. We discussed admit for observation for seizure vs discharge, both PT and wife prefer to be discharge with strict return precautions and close follow. Hx of seizure on medication, and normal neuro exam makes CT brain not indicated at this time. Both PT and wife in agreement.  [SG]      ED Course User Index  [SG] Abdifatah Wharton MD                                                       Medical Decision Making  65-year-old male presents with breakthrough seizure and fever, requiring emergency department evaluation.    Patient presents with known seizure disorder having experienced a breakthrough event. Temperature of 100.5°F necessitates infectious workup. Given the presentation, the following studies were ordered:    - Chest X-ray to rule out pneumonia  - Urinalysis to rule out UTI  - Basic labs including electrolytes  - Respiratory panel  -ekg    Treatment plan includes:  - Administration of Tylenol for fever  - 500cc IV fluid bolus    Patient is being closely monitored for any recurrent seizure activity. No active seizures observed during ED stay.    Problems Addressed:  Seizure: complicated acute illness or injury    Amount and/or Complexity of Data Reviewed  Labs: ordered.  Radiology: ordered.  ECG/medicine tests: ordered.    Risk  OTC drugs.        Final diagnoses:   Seizure       ED Disposition  ED Disposition       ED Disposition   Discharge    Condition   Stable    Comment   --               Vinayak Correia, PA  [address]  [place] [address]  [phone]    In 1 week      Carolina Bautista MD  [address] [place] [address]  [phone]    In 1 week           Medication List        Changed      gabapentin 300 MG capsule  Commonly known as: NEURONTIN  What changed: when to take this                 Abdifatah Wharton MD  04/26/25 1228

## 2025-04-26 NOTE — DISCHARGE INSTRUCTIONS
As discussed please follow-up with primary care doctor as well as neurology for reassessment of seizure as well as medications that you are taking for seizure at this time.  Please return to the emergency room with worsening symptoms of seizure, headache, fever, neck pain, neck stiffness, confusion, altered mental status.

## 2025-04-26 NOTE — H&P
[facility] Medicine Services  HISTORY AND PHYSICAL    Date of Admission: 4/26/2025  Primary Care Physician: Vinayak Correia PA    Subjective   Primary Historian: Patient.    Chief Complaint: Breakthrough seizure.    History of Present Illness  Patient is a 65-year-old presented ER complaining of seizure activity.  Patient stated he had 5 to seizure episode at home since yesterday.  Patient was here previously about 2 days ago for similar symptoms and was complaining of tonic-clonic seizure and was discharged home once everything came back negative.  Patient was evaluated in ER was given 1000 mg of Keppra, teleneurology was called and recommended admission with the hospitalist.    Patient has a past medical history arthritis, asthma, skin cancer, carotid disease bilateral cyst, cellulitis of the right foot, chest pain, diastolic heart failure,'s sinusitis, kathia bullosa, CAD, deviated septum, diabetes, difficult urination, diverticulitis, enlarged prostate, fatty liver, reflux, hard of hearing, hyperlipidemia, hypertrophy of nasal turbinates, keratoderma, kidney stone, lung nodule right lower lobe, migraine, heart murmur, cardiac infarct, obesity, atrial fibs, COVID, postop nausea vomiting, obesity, atrial fibrillation.    Review of Systems   Constitutional:  Positive for activity change, appetite change and fatigue. Negative for chills and fever.   HENT:  Negative for hearing loss, nosebleeds, tinnitus and trouble swallowing.    Eyes:  Negative for visual disturbance.   Respiratory:  Negative for cough, chest tightness, shortness of breath and wheezing.    Cardiovascular:  Negative for chest pain, palpitations and leg swelling.   Gastrointestinal:  Negative for abdominal distention, abdominal pain, blood in stool, constipation, diarrhea, nausea and vomiting.   Endocrine: Negative for cold intolerance, heat intolerance, polydipsia, polyphagia and polyuria.   Genitourinary:   Negative for decreased urine volume, difficulty urinating, dysuria, flank pain, frequency and hematuria.   Musculoskeletal:  Positive for arthralgias, gait problem and myalgias. Negative for joint swelling.   Skin:  Negative for rash.   Allergic/Immunologic: Negative for immunocompromised state.   Neurological:  Positive for weakness. Negative for dizziness, syncope, light-headedness and headaches.   Hematological:  Negative for adenopathy. Does not bruise/bleed easily.   Psychiatric/Behavioral:  Negative for confusion and sleep disturbance. The patient is not nervous/anxious.         Otherwise complete ROS reviewed and negative except as mentioned in the HPI.    Past Medical History:   Past Medical History:   Diagnosis Date    Arthritis     Asthma     Cancer     skin    Carotid disease, bilateral     Cellulitis     right foot - on antibiotics 6-    Chest pain     Chronic diastolic congestive heart failure 09/07/2019    Chronic sinusitis     Maribell bullosa     Coronary artery disease involving native coronary artery of native heart with unstable angina pectoris 01/17/2017    Deviated septum     Diabetes mellitus     Difficulty urinating     Diverticulitis     Enlarged prostate     Fatty liver     GERD (gastroesophageal reflux disease)     [place] (hard of hearing)     Does have hearing aids    Hyperlipidemia LDL goal <70 02/02/2017    Hypertrophy of nasal turbinates     Keratoderma     Kidney stone     Lung nodules     lower right lung    Migraine     Murmur, heart     Myocardial infarction     Obesity     Paroxysmal atrial fibrillation 07/11/2019    Personal history of COVID-19 07/2021    PONV (postoperative nausea and vomiting)     Primary hypertension 10/16/2016    Psoriasis     Seizures     Sinus congestion     Skin cancer     Sleep apnea     not using cpap    SOB (shortness of breath)     Stroke     mild weakness on right side    UTI (urinary tract infection)      Past Surgical History:  Past Surgical  History:   Procedure Laterality Date    CARDIAC CATHETERIZATION  01/2016    Dr. Broadbent; widely patent previously placed stents in the left anterior descending and obstructive disease involving the diagonal branch which was treated medically    CARDIAC CATHETERIZATION N/A 07/14/2017    Procedure: Left Heart Cath;  Surgeon: Wade Ramey MD;  Location:  [facility];  Service:     CARDIAC CATHETERIZATION Left 10/15/2018    Procedure: Cardiac Catheterization/Vascular Study;  Surgeon: Wade Ramey MD;  Location:  [facility];  Service: Cardiology    CARDIAC CATHETERIZATION  10/15/2018    Procedure: Functional Flow Pelican;  Surgeon: Wade Ramey MD;  Location:  [facility];  Service: Cardiology    CARDIAC CATHETERIZATION N/A 10/15/2018    Procedure: Left ventriculography;  Surgeon: Wade Ramey MD;  Location:  [facility];  Service: Cardiology    CARDIAC CATHETERIZATION Left 06/26/2019    Procedure: Cardiac Catheterization/Vascular Study VEL OK  HE WILL WAIT 1 YEAR FOR SHOULDER SURGERY ;  Surgeon: Wade Ramey MD;  Location:  [facility];  Service: Cardiology    CARDIAC CATHETERIZATION Left 04/30/2021    Procedure: Coronary angiography;  Surgeon: Sahil Llamas MD;  Location:  [facility];  Service: Cardiology;  Laterality: Left;    CARDIAC CATHETERIZATION N/A 04/30/2021    Procedure: Percutaneous Coronary Intervention;  Surgeon: Sahil Llamas MD;  Location:  [facility];  Service: Cardiology;  Laterality: N/A;    CARDIAC CATHETERIZATION N/A 11/09/2022    Procedure: Left Heart Cath with SVGs;  Surgeon: Wade Ramey MD;  Location:  [facility];  Service: Cardiology;  Laterality: N/A;    CARPAL TUNNEL RELEASE      January 2024 and pther hand February 2024    CHOLECYSTECTOMY      CHOLECYSTECTOMY WITH INTRAOPERATIVE CHOLANGIOGRAM N/A 08/01/2018    Procedure: CHOLECYSTECTOMY  LAPAROSCOPIC INTRAOPERATIVE CHOLANGIOGRAM;  Surgeon: Shane Ann MD;  Location: [facility] OR;  Service: General    COLONOSCOPY N/A 07/14/2020    Procedure: COLONOSCOPY WITH ANESTHESIA;  Surgeon: Anupam Morales DO;  Location: [facility] ENDOSCOPY;  Service: Gastroenterology;  Laterality: N/A;  pre: abdominal pain  post: diverticulosis  Vinayak Correia PA    CORONARY ANGIOPLASTY      CORONARY ARTERY BYPASS GRAFT N/A 07/06/2019    Procedure: CABG X2 WITH LIMA, LEFT LEG OVH, AND PLACEMENT OF LEFT FEMORAL ARTERIAL LINE;  Surgeon: Steven Tang MD;  Location: [facility] OR;  Service: Cardiothoracic    CORONARY STENT PLACEMENT      x 6    CYSTOSCOPY TRANSURETHRAL RESECTION OF PROSTATE N/A 01/23/2023    Procedure: CYSTOSCOPY TRANSURETHRAL RESECTION OF PROSTATE;  Surgeon: Latrell Pope MD;  Location: [facility] OR;  Service: Urology;  Laterality: N/A;    ENDOSCOPIC FUNCTIONAL SINUS SURGERY (FESS) Bilateral 12/13/2017    Procedure: PROCEDURE PERFORMED:  Bilateral functional endoscopic anterior ethmoidectomy with bilateral middle meatal antrostomy Septoplasty Right kathia bullosa resection Bilateral inferior turbinate reduction via Coblation;  Surgeon: Mayank Ibarra MD;  Location: [facility] OR;  Service:     ENDOSCOPY N/A 07/30/2018    Procedure: ESOPHAGOGASTRODUODENOSCOPY WITH ANESTHESIA;  Surgeon: Benitez Mas MD;  Location: [facility] ENDOSCOPY;  Service: Gastroenterology    ENDOSCOPY N/A 07/14/2020    Procedure: ESOPHAGOGASTRODUODENOSCOPY WITH ANESTHESIA;  Surgeon: Anupam Morales DO;  Location: [facility] ENDOSCOPY;  Service: Gastroenterology;  Laterality: N/A;  pre: abdominal pain  post: esophagitis  Vinayak Correia PA    HERNIA REPAIR      x2 inguinal area    KIDNEY STONE SURGERY      KNEE ARTHROSCOPY Right 03/01/2022    Procedure: RIGHT KNEE PARTIAL LATERAL MENISCECTOMY;  Surgeon: Pedro Pablo Song MD;  Location: [facility] OR;  Service: Orthopedics;  Laterality: Right;    KNEE SURGERY Right      OTHER SURGICAL HISTORY      urolift    PROSTATE SURGERY      Dr. Badillo - 2017    PULMONARY ARTERY PRESSURE SENSOR IMPLANT N/A 05/08/2023    Procedure: PA Pressure Sensor Implant;  Surgeon: Wade Ramey MD;  Location:  [facility];  Service: Cardiology;  Laterality: N/A;    ROTATOR CUFF REPAIR Right     SLEEP ENDOSCOPY N/A 02/27/2023    Procedure: Videosleep endoscopy;  Surgeon: Mayank Ibarra MD;  Location:  [facility] OR;  Service: ENT;  Laterality: N/A;    THUMB AMPUTATION Left     partial    TOE FUSION Right 7/3/2024    Procedure: Hallux Interphalangeal Joint Arthrodesis, Bone Graft [place] - Right Foot;  Surgeon: Hernan Villa DPM;  Location:  [facility] OR;  Service: Podiatry;  Laterality: Right;    TOE SURGERY      great toe     Social History:  reports that he quit smoking about 32 years ago. His smoking use included cigarettes and cigars. He started smoking about 50 years ago. He has a 27 pack-year smoking history. He has been exposed to tobacco smoke. He quit smokeless tobacco use about 16 years ago.  His smokeless tobacco use included chew. He reports that he does not drink alcohol and does not use drugs.    Family History: family history includes Asthma in his mother; COPD in his mother; Colon cancer in his maternal grandmother and paternal uncle; Colon polyps in his maternal grandmother; Heart disease in his father; Hypertension in his mother; No Known Problems in his sister and sister; Prostate cancer in his maternal grandfather.       Allergies:  Allergies   Allergen Reactions    Flagyl [Metronidazole] Hives    Atorvastatin Other (See Comments) and Myalgia      - LIPITOR -   Muscle cramps    Ciprofloxacin Hives      - CIPRO -        Medications:  Prior to Admission medications    Medication Sig Start Date End Date Taking? Authorizing Provider   acetaminophen (TYLENOL) 325 MG tablet Take 1 tablet by mouth Every 6 (Six) Hours As Needed for Mild Pain.    Provider, MD Rohan    albuterol (PROVENTIL HFA;VENTOLIN HFA) 108 (90 BASE) MCG/ACT inhaler Inhale 2 puffs Every 6 (Six) Hours As Needed for Wheezing.    Rohan Christina MD   apixaban (ELIQUIS) 5 MG tablet tablet Take 1 tablet by mouth 2 (Two) Times a Day.    Rohan Christina MD   Aspirin 81 MG capsule Take 81 mg by mouth Daily.    Rohan Christina MD   calcium polycarbophil (FIBERCON) 625 MG tablet Take 1 tablet by mouth Daily.    Rohan Christina MD   carboxymethylcellulose (REFRESH PLUS) 0.5 % solution Administer 1 drop to both eyes 4 (Four) Times a Day As Needed for Dry Eyes.    Rohan Christina MD   docusate sodium (COLACE) 100 MG capsule Take 1 capsule by mouth Daily As Needed for Constipation.    Rohan Christina MD   empagliflozin (JARDIANCE) 25 MG tablet tablet Take 1 tablet by mouth Daily.    Rohan Christina MD   ezetimibe (ZETIA) 10 MG tablet Take 1 tablet by mouth Daily.    Rohan Christina MD   fluticasone (FLONASE) 50 MCG/ACT nasal spray Administer 2 sprays into the nostril(s) as directed by provider Daily.    Rohan Christina MD   furosemide (Lasix) 40 MG tablet Take 1 tablet by mouth Daily As Needed (take as needed for weight gain more than 2 pounds in a day or more than than 3 pounds in 2 days.). 10/17/24   Ben Burden APRN   gabapentin (NEURONTIN) 300 MG capsule Take 1 capsule by mouth Every Night.  Patient taking differently: Take 1 capsule by mouth 2 (Two) Times a Day.    Rohan Christina MD   guaiFENesin (MUCINEX) 600 MG 12 hr tablet Take 2 tablets by mouth Daily As Needed for Congestion.    Rohan Christina MD   HYDROcodone Bit-Homatrop MBr (HYCODAN) 5-1.5 MG/5ML solution Take 5 mL by mouth Every 4 (Four) Hours As Needed for Cough.  Patient not taking: Reported on 4/9/2025 12/26/24   Andriy Bo Jr., MD   insulin aspart (novoLOG FLEXPEN) 100 UNIT/ML solution pen-injector sc pen Inject 50 Units under the skin into the appropriate area as directed 3  (Three) Times a Day With Meals. Can use 5 to 6 more units if needed in early evening.    Rohan Christina MD   INSULIN GLARGINE-YFGN SC Inject 100 Units under the skin into the appropriate area as directed 2 (Two) Times a Day.    Rohan Christina MD   isosorbide mononitrate (IMDUR) 120 MG 24 hr tablet Take 1 tablet by mouth Daily. 1/11/22   Agnieszka Jeff APRN   lamoTRIgine (LaMICtal) 200 MG tablet Take 1 tablet by mouth 2 (Two) Times a Day. 4/9/25 4/9/26  Komal Herzog APRN   levETIRAcetam (KEPPRA) 500 MG tablet Take 3 tablets by mouth Every Morning. 4/9/25 4/9/26  Komal Herzog APRN   levETIRAcetam (KEPPRA) 500 MG tablet Take 4 tablets by mouth Every Night. 4/9/25 4/9/26  Komal Herzog APRN   metFORMIN (GLUCOPHAGE) 1000 MG tablet Take 1 tablet by mouth 2 (Two) Times a Day With Meals.    Rohan Christina MD   methylPREDNISolone (MEDROL) 4 MG dose pack Take as directed on package instructions.  Patient not taking: Reported on 4/9/2025 12/26/24   Andriy Bo Jr., MD   metoprolol tartrate (LOPRESSOR) 100 MG tablet Take 1 tablet by mouth 2 (Two) Times a Day.    Rohan Christina MD   Multiple Vitamin (MULTI VITAMIN PO) Take 1 tablet by mouth Daily.    Rohan Christina MD   nitroglycerin (NITROSTAT) 0.4 MG SL tablet Place 1 tablet under the tongue Every 5 (Five) Minutes As Needed for Chest Pain. Take no more than 3 doses in 15 minutes. 4/4/24   Ben Burden APRN   ondansetron ODT (ZOFRAN-ODT) 4 MG disintegrating tablet Place 1 tablet on the tongue Every 6 (Six) Hours As Needed for Nausea or Vomiting for up to 12 doses. 9/19/24   Radha Rea APRN   pantoprazole (PROTONIX) 40 MG EC tablet Take 1 tablet by mouth 2 (Two) Times a Day.    Rohan Christina MD   polyethylene glycol (MIRALAX) 17 g packet Take 17 g by mouth Daily.    Rohan Christina MD   psyllium (METAMUCIL) 58.6 % packet Take 1 packet by mouth Daily As Needed (constipation).    Provider,  "MD Rohan   Rimegepant Sulfate (Nurtec) 75 MG tablet dispersible tablet Take 1 tablet by mouth 3 (Three) Times a Day As Needed (migraine).    Rohan Christina MD   rimegepant sulfate ODT (Nurtec) 75 MG disintegrating tablet Place 1 tablet under the tongue 1 (One) Time As Needed (at onset) for up to 180 days. 4/9/25 10/6/25  Komal Herzog APRN   rosuvastatin (CRESTOR) 40 MG tablet Take 0.5 tablets by mouth Every Night.    Rohan Christina MD   sacubitril-valsartan (Entresto)  MG tablet Take 1 tablet by mouth 2 (Two) Times a Day.    Rohan Christina MD   Semaglutide,0.25 or 0.5MG/DOS, (Ozempic, 0.25 or 0.5 MG/DOSE,) 2 MG/1.5ML solution pen-injector Inject 0.5 mg under the skin into the appropriate area as directed 1 (One) Time Per Week. Monday    Rohan Christina MD   tamsulosin (FLOMAX) 0.4 MG capsule 24 hr capsule Take 1 capsule by mouth Daily.    Rohan Christina MD   triamcinolone (KENALOG) 0.1 % ointment Apply 1 Application topically to the appropriate area as directed 2 (Two) Times a Day. To feet    Rohan Christina MD     I have utilized all available immediate resources to obtain, update, or review the patient's current medications (including all prescriptions, over-the-counter products, herbals, cannabis/cannabidiol products, and vitamin/mineral/dietary (nutritional) supplements).    Objective     Vital Signs: /77   Pulse 93   Temp 98.1 °F (36.7 °C) (Oral)   Resp 18   Ht 182.9 cm (72\")   Wt 111 kg (245 lb)   SpO2 93%   BMI 33.23 kg/m²   Physical Exam  Vitals and nursing note reviewed.   HENT:      Head: Normocephalic.   Eyes:      Conjunctiva/sclera: Conjunctivae normal.      Pupils: Pupils are equal, round, and reactive to light.   Cardiovascular:      Rate and Rhythm: Normal rate and regular rhythm.      Heart sounds: Normal heart sounds.   Pulmonary:      Effort: Pulmonary effort is normal. No respiratory distress.      Breath sounds: Normal breath " sounds.      Comments: Patient is on room air.  Abdominal:      General: Bowel sounds are normal. There is no distension.      Palpations: Abdomen is soft.      Tenderness: There is no abdominal tenderness.   Musculoskeletal:         General: No swelling.      Cervical back: Neck supple.   Skin:     General: Skin is warm and dry.      Findings: No rash.   Neurological:      Mental Status: He is alert and oriented to person, place, and time.      Motor: Weakness present.      Coordination: Coordination abnormal.      Gait: Gait abnormal.      Comments: Cranial 2-12 grossly intact.  Patient moves all extremities symmetrically.   Psychiatric:         Mood and Affect: Mood normal.         Behavior: Behavior normal.         Thought Content: Thought content normal.              Results Reviewed:  Lab Results (last 24 hours)       Procedure Component Value Units Date/Time    Comprehensive Metabolic Panel [[id number]]  (Abnormal) Collected: 04/26/25 1652    Specimen: Blood from Arm, Right Updated: 04/26/25 1716     Glucose 164 mg/dL      BUN 15 mg/dL      Creatinine 1.10 mg/dL      Sodium 136 mmol/L      Potassium 4.5 mmol/L      Chloride 100 mmol/L      CO2 23.0 mmol/L      Calcium 9.3 mg/dL      Total Protein 7.1 g/dL      Albumin 4.2 g/dL      ALT (SGPT) 25 U/L      AST (SGOT) 31 U/L      Alkaline Phosphatase 64 U/L      Total Bilirubin 1.6 mg/dL      Globulin 2.9 gm/dL      A/G Ratio 1.4 g/dL      BUN/Creatinine Ratio 13.6     Anion Gap 13.0 mmol/L      eGFR 73.6 mL/min/1.73     Narrative:      GFR Categories in Chronic Kidney Disease (CKD)      GFR Category          GFR (mL/min/1.73)    Interpretation  G1                     90 or greater         Normal or high (1)  G2                      60-89                Mild decrease (1)  G3a                   45-59                Mild to moderate decrease  G3b                   30-44                Moderate to severe decrease  G4                    15-29                Severe  decrease  G5                    14 or less           Kidney failure          (1)In the absence of evidence of kidney disease, neither GFR category G1 or G2 fulfill the criteria for CKD.    eGFR calculation 2021 CKD-EPI creatinine equation, which does not include race as a factor    Magnesium [[id number]]  (Normal) Collected: 04/26/25 1652    Specimen: Blood from Arm, Right Updated: 04/26/25 1716     Magnesium 1.8 mg/dL     CBC & Differential [[id number]]  (Abnormal) Collected: 04/26/25 1652    Specimen: Blood from Arm, Right Updated: 04/26/25 1704    Narrative:      The following orders were created for panel order CBC & Differential.  Procedure                               Abnormality         Status                     ---------                               -----------         ------                     CBC Auto Differential[[id number]]        Abnormal            Final result                 Please view results for these tests on the individual orders.    CBC Auto Differential [[id number]]  (Abnormal) Collected: 04/26/25 1652    Specimen: Blood from Arm, Right Updated: 04/26/25 1704     WBC 7.83 10*3/mm3      RBC 4.78 10*6/mm3      Hemoglobin 14.5 g/dL      Hematocrit 43.9 %      MCV 91.8 fL      MCH 30.3 pg      MCHC 33.0 g/dL      RDW 13.6 %      RDW-SD 46.4 fl      MPV 11.0 fL      Platelets 87 10*3/mm3      Neutrophil % 89.5 %      Lymphocyte % 5.6 %      Monocyte % 4.1 %      Eosinophil % 0.1 %      Basophil % 0.3 %      Immature Grans % 0.4 %      Neutrophils, Absolute 7.01 10*3/mm3      Lymphocytes, Absolute 0.44 10*3/mm3      Monocytes, Absolute 0.32 10*3/mm3      Eosinophils, Absolute 0.01 10*3/mm3      Basophils, Absolute 0.02 10*3/mm3      Immature Grans, Absolute 0.03 10*3/mm3     Levetiracetam Level (Keppra) [[id number]] Collected: 04/26/25 1652    Specimen: Blood from Arm, Right Updated: 04/26/25 1656          Imaging Results (Last 24 Hours)       Procedure Component Value Units Date/Time    CT Head  Without Contrast [[id number]] Collected: 04/26/25 1658     Updated: 04/26/25 1703    Narrative:      EXAMINATION: CT HEAD WO CONTRAST-  4/26/2025 4:58 PM     HISTORY: seizures     COMPARISON: 9/21/2023     DLP: 1069.19 mGy.cm     In order to have a CT radiation dose as low as reasonably achievable,  Automated Exposure Control was utilized for adjustment of the mA and/or  KV according to patient size.     TECHNIQUE: Serial axial tomographic images of the brain were obtained  without the use of intravenous contrast.  Coronal and sagittal  reformatted images were obtained reviewed as well.     FINDINGS:  No mass effect or midline shift. No acute hemorrhage. There is mild  low-attenuation in the subcortical and periventricular white matter as  can be seen with chronic microvascular change. Vascular calcifications  in the vertebral arteries in the intracranial portions of the carotid  arteries.     Surrounding soft tissues are without acute findings. Orbits and globes  are preserved. Paranasal sinus disease with an air-fluid level in the  RIGHT maxillary sinus suggesting mild acute sinusitis. Mild scattered  mucosal thickening in the ethmoid air cells. Small mastoid effusion on  the RIGHT, unchanged from 2023 and likely chronic.          Impression:         1.  Mild atrophy and chronic white matter changes but no definite acute  intracranial process.     2.  Paranasal sinus disease as discussed above.     This report was signed and finalized on 4/26/2025 5:00 PM by Dr. Valentino Lebron MD.             I have personally reviewed and interpreted the radiology studies and ECG obtained at time of admission.     Assessment / Plan   Assessment:   Active Hospital Problems    Diagnosis     **Recurrent seizures     Chronic heart failure with preserved ejection fraction (HFpEF)     Presence of CardioMEMS HF system     S/P CABG x 2     Chronic diastolic congestive heart failure with preserved ejection fraction     Atrial flutter      Class 1 obesity due to excess calories with serious comorbidity and body mass index (BMI) of 34.0 to 34.9 in adult     Diabetic peripheral neuropathy     Diabetic complication     Deviated nasal septum     Maribell bullosa     Benign non-nodular prostatic hyperplasia with lower urinary tract symptoms     Gastroesophageal reflux disease without esophagitis     Type 2 diabetes mellitus with circulatory disorder, with long-term current use of insulin        Treatment Plan  The patient will be admitted to my service here at [facility].     Seizure . breakthrough seizure . Patient stated he has been taking his medication at home.  Wife stated patient was here yesterday and would value was sent home.  Patient has at least 5 episodes seizure lasting about a minute tonic-clonic type.  Lamictal . Keppra.  Seizure precaution.  Ativan as needed.  CT scan of the head- Mild atrophy and chronic white matter changes but no definite acute  intracranial process, Paranasal sinus disease with an air-fluid level in the RIGHT maxillary sinus suggesting mild acute sinusitis- Mild scattered mucosal thickening in the ethmoid air cells,  Small mastoid effusion on the RIGHT, unchanged from 2023 and likely chronic.    Acute sinusitis.  Rocephin antibiotic for now.    History of migraine.  Nurtec as needed.    History of atrial fibs/CHF/hypertension/hyperlipidemia/CAD.  Patient is currently in sinus rhythm.  Eliquis . Aspirin . Hold Zetia because we do not have it here.  Lasix as needed.  Imdur.  Toprol . Nitro as needed.  Entresto.  Coreg.  Echocardiogram 3/18/23-61 - 65%,  hypokinetic: apical inferior and apical septal and apex hypokinetic, left ventricular diastolic function is consistent with (grade II w/high LAP) pseudonormalization, Left atrial volume is mildly increased,  tricuspid regurgitation is normal (<35 mmHg), Normal size and function of the right ventricle, No significant valvular pathology,  No significant change  compared to prior exam from 4/29/21.    Diabetes . Jardiance . Hold Glucophage for now.  Lantus 25 units twice daily.  Moderate sliding scale for now.    Neuropathy.  Neurontin at night.    Reflux.  Protonix p.o.  Zofran as needed.    Constipation.  Colace as needed.  MiraLAX.    Prostate hypertrophy.  Flomax.    History of asthma/allergic rhinitis.  Albuterol inhaler as needed.  Flonase . Guaifenesin.    Nutrition .  Diabetic/cardiac diet.    Medical Decision Making  Number and Complexity of problems: Seizures/sinusitis/migraine/history of atrial fibs/CAD/CHF  Differential Diagnosis: None    Conditions and Status        Condition is unchanged.     [facility] [facility] Data  External documents reviewed: Previous note .  Cardiac tracing (EKG, telemetry) interpretation: Sinus .  Radiology interpretation: CT scan  Labs reviewed: Laboratory .  Any tests that were considered but not ordered: Laboratory in a.m.     Decision rules/scores evaluated (example [id number], Wells, etc): None.     Discussed with: Patient and wife     Care Planning  Shared decision making: Patient and wife  Code status and discussions: Full code    Disposition  Social Determinants of Health that impact treatment or disposition: From home  Estimated length of stay is 1 to 3 days.     I confirmed that the patient's advanced care plan is present, code status is documented, and a surrogate decision maker is listed in the patient's medical record.     The patient's surrogate decision maker is wife, Daniela.     The patient was seen and examined by me on 4/26/25 at 1800.    Electronically signed by Vish Latham MD, 04/26/25, 18:05 CDT.

## 2025-04-26 NOTE — ED PROVIDER NOTES
Subjective   History of Present Illness  Patient was recently seen in the ED after he had seizures.  He had 4-5 seizures at home and was seen in the ED and then discharged home had another seizure at home he is got episodes of absence in July tonic-clonic activity.  And has been having them frequently came to the ED for evaluation has not missed a dose of his medication.    Seizures  Seizure activity on arrival: no    Seizure type:  Grand mal  Initial focality:  Facial  Episode characteristics: abnormal movements, generalized shaking and partial responsiveness    Episode characteristics: no apnea, no combativeness, no confusion, no focal shaking, no tongue biting and responsive    Postictal symptoms: confusion    Return to baseline: yes    Severity:  Moderate  Timing:  Clustered  Progression:  Worsening  Context: not cerebral palsy, not change in medication, not sleeping less, not developmental delay, not drug use, not emotional upset, not family hx of seizures, not intracranial lesion, not intracranial shunt, medical compliance, not possible hypoglycemia and not possible medication ingestion    Recent head injury:  No recent head injuries  History of seizures: yes        Review of Systems   Constitutional: Negative.  Negative for chills, fatigue and fever.   HENT: Negative.  Negative for congestion.    Respiratory: Negative.  Negative for cough, chest tightness and stridor.    Cardiovascular: Negative.  Negative for chest pain.   Gastrointestinal: Negative.  Negative for abdominal distention, abdominal pain, nausea and vomiting.   Endocrine: Negative.    Genitourinary: Negative.  Negative for difficulty urinating and flank pain.   Musculoskeletal: Negative.    Skin: Negative.  Negative for color change.   Neurological: Negative.  Positive for seizures. Negative for dizziness and headaches.   All other systems reviewed and are negative.      Past Medical History:   Diagnosis Date    Arthritis     Asthma     Cancer      skin    Carotid disease, bilateral     Cellulitis     right foot - on antibiotics 6-    Chest pain     Chronic diastolic congestive heart failure 09/07/2019    Chronic sinusitis     Maribell bullosa     Coronary artery disease involving native coronary artery of native heart with unstable angina pectoris 01/17/2017    Deviated septum     Diabetes mellitus     Difficulty urinating     Diverticulitis     Enlarged prostate     Fatty liver     GERD (gastroesophageal reflux disease)     [place] (hard of hearing)     Does have hearing aids    Hyperlipidemia LDL goal <70 02/02/2017    Hypertrophy of nasal turbinates     Keratoderma     Kidney stone     Lung nodules     lower right lung    Migraine     Murmur, heart     Myocardial infarction     Obesity     Paroxysmal atrial fibrillation 07/11/2019    Personal history of COVID-19 07/2021    PONV (postoperative nausea and vomiting)     Primary hypertension 10/16/2016    Psoriasis     Seizures     Sinus congestion     Skin cancer     Sleep apnea     not using cpap    SOB (shortness of breath)     Stroke     mild weakness on right side    UTI (urinary tract infection)        Allergies   Allergen Reactions    Flagyl [Metronidazole] Hives    Atorvastatin Other (See Comments) and Myalgia      - LIPITOR -   Muscle cramps    Ciprofloxacin Hives      - CIPRO -        Past Surgical History:   Procedure Laterality Date    CARDIAC CATHETERIZATION  01/2016    Dr. Broadbent; widely patent previously placed stents in the left anterior descending and obstructive disease involving the diagonal branch which was treated medically    CARDIAC CATHETERIZATION N/A 07/14/2017    Procedure: Left Heart Cath;  Surgeon: Wade Ramey MD;  Location:  [facility];  Service:     CARDIAC CATHETERIZATION Left 10/15/2018    Procedure: Cardiac Catheterization/Vascular Study;  Surgeon: Wade Ramey MD;  Location:  [facility];  Service: Cardiology    CARDIAC  CATHETERIZATION  10/15/2018    Procedure: Functional Flow [place];  Surgeon: Wade Ramey MD;  Location:  [facility];  Service: Cardiology    CARDIAC CATHETERIZATION N/A 10/15/2018    Procedure: Left ventriculography;  Surgeon: Wade Ramey MD;  Location:  [facility];  Service: Cardiology    CARDIAC CATHETERIZATION Left 06/26/2019    Procedure: Cardiac Catheterization/Vascular Study VEL OK  HE WILL WAIT 1 YEAR FOR SHOULDER SURGERY ;  Surgeon: Wade Ramey MD;  Location:  [facility];  Service: Cardiology    CARDIAC CATHETERIZATION Left 04/30/2021    Procedure: Coronary angiography;  Surgeon: Sahil Llamas MD;  Location:  [facility];  Service: Cardiology;  Laterality: Left;    CARDIAC CATHETERIZATION N/A 04/30/2021    Procedure: Percutaneous Coronary Intervention;  Surgeon: Sahil Llamas MD;  Location:  [facility];  Service: Cardiology;  Laterality: N/A;    CARDIAC CATHETERIZATION N/A 11/09/2022    Procedure: Left Heart Cath with SVGs;  Surgeon: Wade Ramey MD;  Location:  [facility];  Service: Cardiology;  Laterality: N/A;    CARPAL TUNNEL RELEASE      January 2024 and pther hand February 2024    CHOLECYSTECTOMY      CHOLECYSTECTOMY WITH INTRAOPERATIVE CHOLANGIOGRAM N/A 08/01/2018    Procedure: CHOLECYSTECTOMY LAPAROSCOPIC INTRAOPERATIVE CHOLANGIOGRAM;  Surgeon: Shane Ann MD;  Location: [facility] OR;  Service: General    COLONOSCOPY N/A 07/14/2020    Procedure: COLONOSCOPY WITH ANESTHESIA;  Surgeon: Anupam Morales DO;  Location: [facility] ENDOSCOPY;  Service: Gastroenterology;  Laterality: N/A;  pre: abdominal pain  post: diverticulosis  Vinayak Correia PA    CORONARY ANGIOPLASTY      CORONARY ARTERY BYPASS GRAFT N/A 07/06/2019    Procedure: CABG X2 WITH LIMA, LEFT LEG OVH, AND PLACEMENT OF LEFT FEMORAL ARTERIAL LINE;  Surgeon: Steven Tang MD;  Location: [facility] OR;  Service:  Cardiothoracic    CORONARY STENT PLACEMENT      x 6    CYSTOSCOPY TRANSURETHRAL RESECTION OF PROSTATE N/A 01/23/2023    Procedure: CYSTOSCOPY TRANSURETHRAL RESECTION OF PROSTATE;  Surgeon: Latrell Pope MD;  Location:  [facility] OR;  Service: Urology;  Laterality: N/A;    ENDOSCOPIC FUNCTIONAL SINUS SURGERY (FESS) Bilateral 12/13/2017    Procedure: PROCEDURE PERFORMED:  Bilateral functional endoscopic anterior ethmoidectomy with bilateral middle meatal antrostomy Septoplasty Right kathia bullosa resection Bilateral inferior turbinate reduction via [place];  Surgeon: Mayank Ibarra MD;  Location:  [facility] OR;  Service:     ENDOSCOPY N/A 07/30/2018    Procedure: ESOPHAGOGASTRODUODENOSCOPY WITH ANESTHESIA;  Surgeon: Benitez Mas MD;  Location: [facility] ENDOSCOPY;  Service: Gastroenterology    ENDOSCOPY N/A 07/14/2020    Procedure: ESOPHAGOGASTRODUODENOSCOPY WITH ANESTHESIA;  Surgeon: Anupam Morales DO;  Location: [facility] ENDOSCOPY;  Service: Gastroenterology;  Laterality: N/A;  pre: abdominal pain  post: esophagitis  Vinayak Correia, PA    HERNIA REPAIR      x2 inguinal area    KIDNEY STONE SURGERY      KNEE ARTHROSCOPY Right 03/01/2022    Procedure: RIGHT KNEE PARTIAL LATERAL MENISCECTOMY;  Surgeon: Pedro Pablo Song MD;  Location: [facility] OR;  Service: Orthopedics;  Laterality: Right;    KNEE SURGERY Right     OTHER SURGICAL HISTORY      urolift    PROSTATE SURGERY      Dr. Badillo - 2017    PULMONARY ARTERY PRESSURE SENSOR IMPLANT N/A 05/08/2023    Procedure: PA Pressure Sensor Implant;  Surgeon: Wade Ramey MD;  Location: [facility] CATH INVASIVE LOCATION;  Service: Cardiology;  Laterality: N/A;    ROTATOR CUFF REPAIR Right     SLEEP ENDOSCOPY N/A 02/27/2023    Procedure: Videosleep endoscopy;  Surgeon: Mayank Ibarra MD;  Location:  [facility] OR;  Service: ENT;  Laterality: N/A;    THUMB AMPUTATION Left     partial    TOE FUSION Right 7/3/2024    Procedure: Hallux Interphalangeal Joint  Arthrodesis, Bone Graft [place] - Right Foot;  Surgeon: Hernan Villa DPM;  Location:  [facility] OR;  Service: Podiatry;  Laterality: Right;    TOE SURGERY      great toe       Family History   Problem Relation Age of Onset    Heart disease Father     COPD Mother     Hypertension Mother     Asthma Mother     No Known Problems Sister     Colon cancer Paternal Uncle     Prostate cancer Maternal Grandfather     No Known Problems Sister     Colon cancer Maternal Grandmother     Colon polyps Maternal Grandmother        Social History     Socioeconomic History    Marital status:    Tobacco Use    Smoking status: Former     Current packs/day: 0.00     Average packs/day: 1.5 packs/day for 18.0 years (27.0 ttl pk-yrs)     Types: Cigarettes, Cigars     Start date:      Quit date:      Years since quittin.3     Passive exposure: Past    Smokeless tobacco: Former     Types: Chew     Quit date:    Vaping Use    Vaping status: Never Used   Substance and Sexual Activity    Alcohol use: No     Comment: 0    Drug use: No    Sexual activity: Defer           Objective   Physical Exam  Vitals and nursing note reviewed. Exam conducted with a chaperone present.   Constitutional:       General: He is not in acute distress.     Appearance: Normal appearance. He is not ill-appearing or diaphoretic.   HENT:      Head: Normocephalic and atraumatic.      Nose: Nose normal.      Mouth/Throat:      Mouth: Mucous membranes are moist.      Pharynx: Oropharynx is clear.   Eyes:      General: No visual field deficit or scleral icterus.     Extraocular Movements: Extraocular movements intact.      Conjunctiva/sclera: Conjunctivae normal.      Pupils: Pupils are equal, round, and reactive to light.   Neck:      Vascular: No carotid bruit.   Cardiovascular:      Rate and Rhythm: Normal rate and regular rhythm.      Pulses: Normal pulses.      Heart sounds: No murmur heard.     No friction rub.   Pulmonary:      Effort:  Pulmonary effort is normal. No respiratory distress.      Breath sounds: Normal breath sounds. No stridor.   Abdominal:      General: Abdomen is flat. Bowel sounds are normal. There is no distension.      Palpations: There is no mass.      Tenderness: There is no abdominal tenderness.   Musculoskeletal:         General: No swelling or tenderness. Normal range of motion.      Cervical back: Normal range of motion and neck supple. No rigidity. No muscular tenderness.   Lymphadenopathy:      Cervical: No cervical adenopathy.   Skin:     General: Skin is warm.      Capillary Refill: Capillary refill takes less than 2 seconds.      Coloration: Skin is not jaundiced or pale.      Findings: No bruising or rash.   Neurological:      General: No focal deficit present.      Mental Status: He is alert and oriented to person, place, and time. He is confused.      GCS: GCS eye subscore is 4. GCS verbal subscore is 5. GCS motor subscore is 6.      Cranial Nerves: Cranial nerves 2-12 are intact. No cranial nerve deficit, dysarthria or facial asymmetry.      Sensory: Sensation is intact.      Motor: Motor function is intact. No weakness, abnormal muscle tone, seizure activity or pronator drift.      Coordination: Coordination is intact.      Gait: Gait is intact.      Deep Tendon Reflexes:      Reflex Scores:       Bicep reflexes are 2+ on the right side and 2+ on the left side.       Patellar reflexes are 2+ on the right side and 2+ on the left side.  Psychiatric:         Attention and Perception: Attention normal.         Mood and Affect: Mood and affect normal.         Speech: Speech normal.         Behavior: Behavior normal.         Procedures           ED Course  ED Course as of 04/26/25 1718   Sat Apr 26, 2025   1715 Last night's lab workup essentially unremarkable.  Patient given a gram of Keppra have discussed this case with Dr. Santizo with neurology and the patient to be admitted to medicine service Dr. Godinez was  informed CT of the head shows chronic changes. [TS]      ED Course User Index  [TS] Nj Guidry MD                                                       Medical Decision Making  Patient history of seizures has been compliant with medication no new trauma.  Came to ER for further evaluation.    Problems Addressed:  Recurrent seizures: complicated acute illness or injury    Amount and/or Complexity of Data Reviewed  Labs: ordered.     Details: Prior labs and today's labs reviewed  Radiology: ordered.     Details: His head was reviewed  ECG/medicine tests: ordered.  Discussion of management or test interpretation with external provider(s): Tracyd with Dr. Huynh and also with Dr. Santizo with neurology.    Risk  Prescription drug management.  Decision regarding hospitalization.  Risk Details: Will admit        Final diagnoses:   Recurrent seizures       ED Disposition  ED Disposition       ED Disposition   Decision to Admit    Condition   --    Comment   Level of Care: Telemetry [5]   Diagnosis: Recurrent seizures [378761]   Admitting Physician: JIMBO DAVILA [[id number]]   Attending Physician: JIMBO DAVILA [[id number]]   Certification: I Certify That Inpatient Hospital Services Are Medically Necessary For Greater Than 2 Midnights                 No follow-up provider specified.       Medication List      No changes were made to your prescriptions during this visit.            Nj Guidry MD  04/26/25 1537       Nj Guidry MD  04/26/25 1716       Nj Guidry MD  04/26/25 1718

## 2025-04-27 ENCOUNTER — APPOINTMENT (OUTPATIENT)
Dept: GENERAL RADIOLOGY | Facility: HOSPITAL | Age: 65
End: 2025-04-27
Payer: OTHER GOVERNMENT

## 2025-04-27 ENCOUNTER — APPOINTMENT (OUTPATIENT)
Dept: NEUROLOGY | Facility: HOSPITAL | Age: 65
End: 2025-04-27
Payer: OTHER GOVERNMENT

## 2025-04-27 ENCOUNTER — APPOINTMENT (OUTPATIENT)
Dept: ULTRASOUND IMAGING | Facility: HOSPITAL | Age: 65
End: 2025-04-27
Payer: OTHER GOVERNMENT

## 2025-04-27 LAB
ALBUMIN SERPL-MCNC: 4 G/DL (ref 3.5–5.2)
ALBUMIN/GLOB SERPL: 1.3 G/DL
ALP SERPL-CCNC: 63 U/L (ref 39–117)
ALT SERPL W P-5'-P-CCNC: 26 U/L (ref 1–41)
ANION GAP SERPL CALCULATED.3IONS-SCNC: 14 MMOL/L (ref 5–15)
AST SERPL-CCNC: 42 U/L (ref 1–40)
BASOPHILS # BLD AUTO: 0.01 10*3/MM3 (ref 0–0.2)
BASOPHILS NFR BLD AUTO: 0.2 % (ref 0–1.5)
BILIRUB SERPL-MCNC: 2 MG/DL (ref 0–1.2)
BUN SERPL-MCNC: 15 MG/DL (ref 8–23)
BUN/CREAT SERPL: 14.7 (ref 7–25)
CALCIUM SPEC-SCNC: 8.9 MG/DL (ref 8.6–10.5)
CHLORIDE SERPL-SCNC: 99 MMOL/L (ref 98–107)
CHOLEST SERPL-MCNC: 97 MG/DL (ref 0–200)
CO2 SERPL-SCNC: 23 MMOL/L (ref 22–29)
CREAT SERPL-MCNC: 1.02 MG/DL (ref 0.76–1.27)
DEPRECATED RDW RBC AUTO: 46.8 FL (ref 37–54)
EGFRCR SERPLBLD CKD-EPI 2021: 80.6 ML/MIN/1.73
EOSINOPHIL # BLD AUTO: 0 10*3/MM3 (ref 0–0.4)
EOSINOPHIL NFR BLD AUTO: 0 % (ref 0.3–6.2)
ERYTHROCYTE [DISTWIDTH] IN BLOOD BY AUTOMATED COUNT: 13.7 % (ref 12.3–15.4)
GLOBULIN UR ELPH-MCNC: 3 GM/DL
GLUCOSE BLDC GLUCOMTR-MCNC: 156 MG/DL (ref 70–130)
GLUCOSE BLDC GLUCOMTR-MCNC: 157 MG/DL (ref 70–130)
GLUCOSE BLDC GLUCOMTR-MCNC: 214 MG/DL (ref 70–130)
GLUCOSE BLDC GLUCOMTR-MCNC: 255 MG/DL (ref 70–130)
GLUCOSE SERPL-MCNC: 177 MG/DL (ref 65–99)
HBA1C MFR BLD: 7.6 % (ref 4.8–5.6)
HCT VFR BLD AUTO: 46.9 % (ref 37.5–51)
HDLC SERPL-MCNC: 30 MG/DL (ref 40–60)
HGB BLD-MCNC: 15.2 G/DL (ref 13–17.7)
IMM GRANULOCYTES # BLD AUTO: 0.03 10*3/MM3 (ref 0–0.05)
IMM GRANULOCYTES NFR BLD AUTO: 0.5 % (ref 0–0.5)
LDLC SERPL CALC-MCNC: 46 MG/DL (ref 0–100)
LDLC/HDLC SERPL: 1.47 {RATIO}
LYMPHOCYTES # BLD AUTO: 0.51 10*3/MM3 (ref 0.7–3.1)
LYMPHOCYTES NFR BLD AUTO: 8.9 % (ref 19.6–45.3)
MCH RBC QN AUTO: 30.2 PG (ref 26.6–33)
MCHC RBC AUTO-ENTMCNC: 32.4 G/DL (ref 31.5–35.7)
MCV RBC AUTO: 93.2 FL (ref 79–97)
MONOCYTES # BLD AUTO: 0.35 10*3/MM3 (ref 0.1–0.9)
MONOCYTES NFR BLD AUTO: 6.1 % (ref 5–12)
NEUTROPHILS NFR BLD AUTO: 4.82 10*3/MM3 (ref 1.7–7)
NEUTROPHILS NFR BLD AUTO: 84.3 % (ref 42.7–76)
PLATELET # BLD AUTO: 73 10*3/MM3 (ref 140–450)
PMV BLD AUTO: 11.2 FL (ref 6–12)
POTASSIUM SERPL-SCNC: 4.9 MMOL/L (ref 3.5–5.2)
PROT SERPL-MCNC: 7 G/DL (ref 6–8.5)
QT INTERVAL: 350 MS
QTC INTERVAL: 418 MS
RBC # BLD AUTO: 5.03 10*6/MM3 (ref 4.14–5.8)
SODIUM SERPL-SCNC: 136 MMOL/L (ref 136–145)
TRIGL SERPL-MCNC: 115 MG/DL (ref 0–150)
TSH SERPL DL<=0.05 MIU/L-ACNC: 0.64 UIU/ML (ref 0.27–4.2)
VLDLC SERPL-MCNC: 21 MG/DL (ref 5–40)
WBC NRBC COR # BLD AUTO: 5.72 10*3/MM3 (ref 3.4–10.8)

## 2025-04-27 PROCEDURE — 63710000001 INSULIN LISPRO (HUMAN) PER 5 UNITS: Performed by: FAMILY MEDICINE

## 2025-04-27 PROCEDURE — 95819 EEG AWAKE AND ASLEEP: CPT

## 2025-04-27 PROCEDURE — 99221 1ST HOSP IP/OBS SF/LOW 40: CPT | Performed by: CLINICAL NURSE SPECIALIST

## 2025-04-27 PROCEDURE — 63710000001 INSULIN GLARGINE PER 5 UNITS: Performed by: FAMILY MEDICINE

## 2025-04-27 PROCEDURE — 87040 BLOOD CULTURE FOR BACTERIA: CPT | Performed by: FAMILY MEDICINE

## 2025-04-27 PROCEDURE — 95819 EEG AWAKE AND ASLEEP: CPT | Performed by: PSYCHIATRY & NEUROLOGY

## 2025-04-27 PROCEDURE — 80061 LIPID PANEL: CPT | Performed by: FAMILY MEDICINE

## 2025-04-27 PROCEDURE — 84443 ASSAY THYROID STIM HORMONE: CPT | Performed by: FAMILY MEDICINE

## 2025-04-27 PROCEDURE — 25010000002 CEFTRIAXONE PER 250 MG: Performed by: FAMILY MEDICINE

## 2025-04-27 PROCEDURE — 83036 HEMOGLOBIN GLYCOSYLATED A1C: CPT | Performed by: FAMILY MEDICINE

## 2025-04-27 PROCEDURE — 80053 COMPREHEN METABOLIC PANEL: CPT | Performed by: FAMILY MEDICINE

## 2025-04-27 PROCEDURE — 73620 X-RAY EXAM OF FOOT: CPT

## 2025-04-27 PROCEDURE — 85025 COMPLETE CBC W/AUTO DIFF WBC: CPT | Performed by: FAMILY MEDICINE

## 2025-04-27 PROCEDURE — 82948 REAGENT STRIP/BLOOD GLUCOSE: CPT

## 2025-04-27 PROCEDURE — 97166 OT EVAL MOD COMPLEX 45 MIN: CPT

## 2025-04-27 RX ADMIN — LEVETIRACETAM 2000 MG: 500 TABLET, FILM COATED ORAL at 20:27

## 2025-04-27 RX ADMIN — TAMSULOSIN HYDROCHLORIDE 0.4 MG: 0.4 CAPSULE ORAL at 09:42

## 2025-04-27 RX ADMIN — METOPROLOL TARTRATE 100 MG: 100 TABLET, FILM COATED ORAL at 09:42

## 2025-04-27 RX ADMIN — ASPIRIN 81 MG: 81 TABLET, COATED ORAL at 09:42

## 2025-04-27 RX ADMIN — SACUBITRIL AND VALSARTAN 1 TABLET: 97; 103 TABLET, FILM COATED ORAL at 20:27

## 2025-04-27 RX ADMIN — METOPROLOL TARTRATE 100 MG: 100 TABLET, FILM COATED ORAL at 20:28

## 2025-04-27 RX ADMIN — ISOSORBIDE MONONITRATE 120 MG: 30 TABLET, EXTENDED RELEASE ORAL at 09:42

## 2025-04-27 RX ADMIN — PANTOPRAZOLE SODIUM 40 MG: 40 TABLET, DELAYED RELEASE ORAL at 20:27

## 2025-04-27 RX ADMIN — EMPAGLIFLOZIN 25 MG: 10 TABLET, FILM COATED ORAL at 09:42

## 2025-04-27 RX ADMIN — LAMOTRIGINE 200 MG: 100 TABLET ORAL at 20:27

## 2025-04-27 RX ADMIN — Medication 10 ML: at 09:44

## 2025-04-27 RX ADMIN — ROSUVASTATIN CALCIUM 20 MG: 20 TABLET, FILM COATED ORAL at 20:28

## 2025-04-27 RX ADMIN — SACUBITRIL AND VALSARTAN 1 TABLET: 97; 103 TABLET, FILM COATED ORAL at 09:42

## 2025-04-27 RX ADMIN — INSULIN GLARGINE 25 UNITS: 100 INJECTION, SOLUTION SUBCUTANEOUS at 20:28

## 2025-04-27 RX ADMIN — LEVETIRACETAM 1500 MG: 500 TABLET, FILM COATED ORAL at 09:43

## 2025-04-27 RX ADMIN — Medication 10 ML: at 20:29

## 2025-04-27 RX ADMIN — APIXABAN 5 MG: 5 TABLET, FILM COATED ORAL at 20:28

## 2025-04-27 RX ADMIN — INSULIN LISPRO 2 UNITS: 100 INJECTION, SOLUTION INTRAVENOUS; SUBCUTANEOUS at 08:05

## 2025-04-27 RX ADMIN — FLUTICASONE PROPIONATE 2 SPRAY: 50 SPRAY, METERED NASAL at 09:50

## 2025-04-27 RX ADMIN — INSULIN LISPRO 6 UNITS: 100 INJECTION, SOLUTION INTRAVENOUS; SUBCUTANEOUS at 20:34

## 2025-04-27 RX ADMIN — PANTOPRAZOLE SODIUM 40 MG: 40 TABLET, DELAYED RELEASE ORAL at 09:43

## 2025-04-27 RX ADMIN — APIXABAN 5 MG: 5 TABLET, FILM COATED ORAL at 09:42

## 2025-04-27 RX ADMIN — INSULIN GLARGINE 25 UNITS: 100 INJECTION, SOLUTION SUBCUTANEOUS at 09:44

## 2025-04-27 RX ADMIN — INSULIN LISPRO 4 UNITS: 100 INJECTION, SOLUTION INTRAVENOUS; SUBCUTANEOUS at 18:21

## 2025-04-27 RX ADMIN — LAMOTRIGINE 200 MG: 100 TABLET ORAL at 09:42

## 2025-04-27 RX ADMIN — GABAPENTIN 300 MG: 300 CAPSULE ORAL at 20:28

## 2025-04-27 RX ADMIN — SODIUM CHLORIDE 1000 MG: 900 INJECTION INTRAVENOUS at 20:28

## 2025-04-27 NOTE — PROGRESS NOTES
[facility] Medicine Services  INPATIENT PROGRESS NOTE    Patient Name: Carlos A Boyer Jr.  Date of Admission: 4/26/2025  Today's Date: 04/27/25  Length of Stay: 1  Primary Care Physician: Vinayak Correia PA    Subjective   Chief Complaint: Seizure .   HPI   Fever last night, started Rocephin last night for sinusitis.  Tmax 101.5.  T-current 98.3.  Set blood culture STACY.  White blood cells normal.  Decrease in platelets.    Review of Systems   Constitutional:  Positive for activity change, appetite change and fatigue. Negative for chills and fever.   HENT:  Negative for hearing loss, nosebleeds, tinnitus and trouble swallowing.    Eyes:  Negative for visual disturbance.   Respiratory:  Negative for cough, chest tightness, shortness of breath and wheezing.    Cardiovascular:  Negative for chest pain, palpitations and leg swelling.   Gastrointestinal:  Negative for abdominal distention, abdominal pain, blood in stool, constipation, diarrhea, nausea and vomiting.   Endocrine: Negative for cold intolerance, heat intolerance, polydipsia, polyphagia and polyuria.   Genitourinary:  Negative for decreased urine volume, difficulty urinating, dysuria, flank pain, frequency and hematuria.   Musculoskeletal:  Negative for arthralgias, joint swelling and myalgias.   Skin:  Negative for rash.   Allergic/Immunologic: Negative for immunocompromised state.   Neurological:  Positive for weakness. Negative for dizziness, syncope, light-headedness and headaches.   Hematological:  Negative for adenopathy. Does not bruise/bleed easily.   Psychiatric/Behavioral:  Negative for confusion and sleep disturbance. The patient is not nervous/anxious.         All pertinent negatives and positives are as above. All other systems have been reviewed and are negative unless otherwise stated.     Objective    Temp:  [98.1 °F (36.7 °C)-101.5 °F (38.6 °C)] 98.3 °F (36.8 °C)  Heart Rate:  [69-93] 80  Resp:   [18-24] 18  BP: (108-166)/(56-81) 141/75  Physical Exam  Vitals and nursing note reviewed.   HENT:      Head: Normocephalic.   Eyes:      Conjunctiva/sclera: Conjunctivae normal.      Pupils: Pupils are equal, round, and reactive to light.   Cardiovascular:      Rate and Rhythm: Normal rate and regular rhythm.      Heart sounds: Normal heart sounds.   Pulmonary:      Effort: Pulmonary effort is normal. No respiratory distress.      Breath sounds: Normal breath sounds.      Comments: Patient is on room air.  Abdominal:      General: Bowel sounds are normal. There is no distension.      Palpations: Abdomen is soft.      Tenderness: There is no abdominal tenderness.   Musculoskeletal:         General: No swelling.      Cervical back: Neck supple.   Skin:     General: Skin is warm and dry.      Findings: No rash.   Neurological:      Mental Status: He is alert and oriented to person, place, and time.      Motor: Weakness present.      Comments: Cranial 2-12 grossly intact.  Patient moves all extremities symmetrically.   Psychiatric:         Mood and Affect: Mood normal.         Behavior: Behavior normal.         Thought Content: Thought content normal.       Results Review:  I have reviewed the labs, radiology results, and diagnostic studies.    Laboratory Data:   Results from last 7 days   Lab Units 04/27/25  0409 04/26/25  1652 04/25/25  2341   WBC 10*3/mm3 5.72 7.83 3.70   HEMOGLOBIN g/dL 15.2 14.5 14.8   HEMATOCRIT % 46.9 43.9 44.1   PLATELETS 10*3/mm3 73* 87* 105*        Results from last 7 days   Lab Units 04/27/25  0409 04/26/25  1652 04/25/25  2341   SODIUM mmol/L 136 136 137   POTASSIUM mmol/L 4.9 4.5 5.0   CHLORIDE mmol/L 99 100 102   CO2 mmol/L 23.0 23.0 20.0*   BUN mg/dL 15 15 18   CREATININE mg/dL 1.02 1.10 1.10   CALCIUM mg/dL 8.9 9.3 9.1   BILIRUBIN mg/dL 2.0* 1.6* 1.2   ALK PHOS U/L 63 64 90   ALT (SGPT) U/L 26 25 19   AST (SGOT) U/L 42* 31 18   GLUCOSE mg/dL 177* 164* 209*       Culture Data:   No  "results found for: \"BLOODCX\", \"URINECX\", \"WOUNDCX\", \"MRSACX\", \"RESPCX\", \"STOOLCX\"    Radiology Data:   Imaging Results (Last 24 Hours)       Procedure Component Value Units Date/Time    CT Head Without Contrast [[id number]] Collected: 04/26/25 1658     Updated: 04/26/25 1703    Narrative:      EXAMINATION: CT HEAD WO CONTRAST-  4/26/2025 4:58 PM     HISTORY: seizures     COMPARISON: 9/21/2023     DLP: 1069.19 mGy.cm     In order to have a CT radiation dose as low as reasonably achievable,  Automated Exposure Control was utilized for adjustment of the mA and/or  KV according to patient size.     TECHNIQUE: Serial axial tomographic images of the brain were obtained  without the use of intravenous contrast.  Coronal and sagittal  reformatted images were obtained reviewed as well.     FINDINGS:  No mass effect or midline shift. No acute hemorrhage. There is mild  low-attenuation in the subcortical and periventricular white matter as  can be seen with chronic microvascular change. Vascular calcifications  in the vertebral arteries in the intracranial portions of the carotid  arteries.     Surrounding soft tissues are without acute findings. Orbits and globes  are preserved. Paranasal sinus disease with an air-fluid level in the  RIGHT maxillary sinus suggesting mild acute sinusitis. Mild scattered  mucosal thickening in the ethmoid air cells. Small mastoid effusion on  the RIGHT, unchanged from 2023 and likely chronic.          Impression:         1.  Mild atrophy and chronic white matter changes but no definite acute  intracranial process.     2.  Paranasal sinus disease as discussed above.     This report was signed and finalized on 4/26/2025 5:00 PM by Dr. Valentino Lebron MD.               I have reviewed the patient's current medications.     Assessment/Plan   Assessment  Active Hospital Problems    Diagnosis     **Recurrent seizures     Chronic heart failure with preserved ejection fraction (HFpEF)     Presence of " CardioMEMS HF system     S/P CABG x 2     Chronic diastolic congestive heart failure with preserved ejection fraction     Atrial flutter     Class 1 obesity due to excess calories with serious comorbidity and body mass index (BMI) of 34.0 to 34.9 in adult     Diabetic peripheral neuropathy     Diabetic complication     Deviated nasal septum     Maribell bullosa     Benign non-nodular prostatic hyperplasia with lower urinary tract symptoms     Gastroesophageal reflux disease without esophagitis     Type 2 diabetes mellitus with circulatory disorder, with long-term current use of insulin      Assessment and plan .  Seizure . breakthrough seizure . Patient stated he has been taking his medication at home.  Wife stated patient was here yesterday and would value was sent home.  Patient has at least 5 episodes seizure lasting about a minute tonic-clonic type.  Lamictal . Keppra.  Seizure precaution.  Ativan as needed.  CT scan of the head- Mild atrophy and chronic white matter changes but no definite acute  intracranial process, Paranasal sinus disease with an air-fluid level in the RIGHT maxillary sinus suggesting mild acute sinusitis- Mild scattered mucosal thickening in the ethmoid air cells,  Small mastoid effusion on the RIGHT, unchanged from 2023 and likely chronic.     Acute sinusitis/fever last night.  Rocephin antibiotic for now.  White blood cells normal.  Blood culture STACY x 2.     History of migraine.  Nurtec as needed.     History of atrial fibs/CHF/hypertension/hyperlipidemia/CAD.  Patient is currently in sinus rhythm.  Eliquis . Aspirin . Hold Zetia because we do not have it here.  Lasix as needed.  Imdur.  Toprol . Nitro as needed.  Entresto.  Coreg.  Echocardiogram 3/18/23-61 - 65%,  hypokinetic: apical inferior and apical septal and apex hypokinetic, left ventricular diastolic function is consistent with (grade II w/high LAP) pseudonormalization, Left atrial volume is mildly increased,  tricuspid  regurgitation is normal (<35 mmHg), Normal size and function of the right ventricle, No significant valvular pathology,  No significant change compared to prior exam from 4/29/21.    Elevated bilirubin.  History of cholecystectomy.  Denies any abdomen pain.     Diabetes . Jardiance . Hold Glucophage for now.  Lantus 25 units twice daily.  Moderate sliding scale for now.  Hemoglobin A1c C 7.6.     Neuropathy.  Neurontin at night.     Reflux.  Protonix p.o.  Zofran as needed.     Constipation.  Colace as needed.  MiraLAX.    Buttock crack/wound.  Wound care.  Butt paste.    Onychomycosis/toes right second toe.  Patient has been to see podiatry outpatient for nail trimming and evaluation.  Lotion for now dry skin discussed with nursing.  Foot x-rays ordered by neurology.     Prostate hypertrophy.  Flomax.     History of asthma/allergic rhinitis.  Albuterol inhaler as needed.  Flonase . Guaifenesin.     Nutrition .  Diabetic/cardiac diet.     Medical Decision Making  Number and Complexity of problems: Seizures/sinusitis/migraine/history of atrial fibs/CAD/CHF  Differential Diagnosis: None     Conditions and Status        Condition is unchanged.     [facility] Data  External documents reviewed: Previous note .  Cardiac tracing (EKG, telemetry) interpretation: Sinus .  Radiology interpretation: CT scan  Labs reviewed: Laboratory .  Any tests that were considered but not ordered: Laboratory in a.m.     Decision rules/scores evaluated (example [id number], Wells, etc): None.     Discussed with: Patient and wife     Care Planning  Shared decision making: Patient and wife  Code status and discussions: Full code     Disposition  Social Determinants of Health that impact treatment or disposition: From home  Estimated length of stay is 1 to 3 days.          Electronically signed by Vish Latham MD, 04/27/25, 08:17 CDT.

## 2025-04-27 NOTE — CASE MANAGEMENT/SOCIAL WORK
Discharge Planning Assessment   Charla     Patient Name: Carlos A Boyer Jr.  MRN: [id number]  Today's Date: 4/27/2025    Admit Date: 4/26/2025    Plan: Home   Discharge Needs Assessment       Row Name 04/27/25 1003       Living Environment    People in Home spouse    Name(s) of People in Home kennedy- Daniela    Current Living Arrangements home    Potentially Unsafe Housing Conditions none    In the past 12 months has the electric, gas, oil, or water company threatened to shut off services in your home? No    Primary Care Provided by self;spouse/significant other    Provides Primary Care For no one, unable/limited ability to care for self    Family Caregiver if Needed spouse    Quality of Family Relationships supportive;involved;helpful    Able to Return to Prior Arrangements yes       Resource/Environmental Concerns    Resource/Environmental Concerns none    Transportation Concerns none       Transportation Needs    In the past 12 months, has lack of transportation kept you from medical appointments or from getting medications? no    In the past 12 months, has lack of transportation kept you from meetings, work, or from getting things needed for daily living? No       Food Insecurity    Within the past 12 months, you worried that your food would run out before you got the money to buy more. Never true    Within the past 12 months, the food you bought just didn't last and you didn't have money to get more. Never true       Transition Planning    Patient/Family Anticipates Transition to home with family    Patient/Family Anticipated Services at Transition none    Transportation Anticipated family or friend will provide       Discharge Needs Assessment    Readmission Within the Last 30 Days no previous admission in last 30 days    Equipment Currently Used at Home --  has cane and RW available if needed    Concerns to be Addressed denies needs/concerns at this time    Anticipated Changes Related to Illness none     Current Discharge Risk chronically ill                   Discharge Plan       Row Name 04/27/25 1009       Plan    Plan Home    Patient/Family in Agreement with Plan yes    Plan Comments Spoke with pt/wife in room to assess for d/c planning. Pt lives at home with wife and all planning same. Pt has used no DME recently, no HH in home and not felt needed.  [facility], [facility], [facility] [place]. Pt normally independent.  Wife stated they have a 15 year old granddaughter part time as they are working on getting custody of her. Will follow.                       Demographic Summary    No documentation.                  Functional Status    No documentation.                  Psychosocial    No documentation.                  Abuse/Neglect    No documentation.                  Legal    No documentation.                  Substance Abuse    No documentation.                  Patient Forms    No documentation.                     LISA GarW

## 2025-04-27 NOTE — CONSULTS
Neurology Consult Note    Consult Date: 2025  Referring MD: Vish Latham MD  Reason for Consult: breakthrough seizure    Patient: Carlos A Boyer Jr. (65 y.o. male)  MRN: [id number]  : 1960    History of Present Illness:   Carlos A Boyer Jr. is a 65 y.o. male with PMH PAF on Eliquis, cardiac stent, carotid disease, CHF, seizure disorder, HOA with CPAP, aortic aneurysm, migraines. Patient recently saw Dr. Tang and CTA chest  showed metallic object in left pulmonary artery with no recent medical procedure and object was seen on CT scans 2023 and no seen on previous CTA 2023. . Seizure and migraines followed by REJI DICKINSON last seen on 2025.    IN CHART REVIEW, PATIENT HAD CARDIOMEMS PLACED 2023. WONDERING IF THIS IS THE METALIC ITEM SEEN IN CHEST.  Patient reports recent tick bite on 2025. Patient denies recent illness. His last breakthrough seizure was more than 3 years ago. He denies missing doses of Keppra 1500 mg BID and lamictal 200mg BID. Patient also takes Gabapentin 300 mg at HS.   Patient presented to [facility] ED on 2025 with prolonged seizure like activity lasting about 25-30 minutes. Episode consistent with previous seizures of full body shaking and garbled speech and confusion with no tongue biting or incontinence. He was evaluated and d/c home. The next day patient alerted his wife he needed help and he was found on the floor, very weak and confused. He then presented to ED again on . He was loaded with Keppra 1000 mg IV. Patient admitted for evaluation. He did spike fever 101.5. wife states he has done this with seizure in the past. Blood cultures pending. He has been afebrile since. He does c/o mild HA. Denies light sensitivity and no nuchal rigidity. He is somewhat disoriented this AM thinking it was Wednesday and at shift change wife reported it was night time even though the sun had risen .platelet count 16095 and bilirubin 2.0.     Patient has  chronic wound to middle toe with redness radiating up foot and per wife the redness is new and he was scheduled to see wound care.       Medical History:   Past Medical/Surgical Hx:  Past Medical History:   Diagnosis Date    Arthritis     Asthma     Cancer     skin    Carotid disease, bilateral     Cellulitis     right foot - on antibiotics 6-    Chest pain     Chronic diastolic congestive heart failure 09/07/2019    Chronic sinusitis     Maribell bullosa     Coronary artery disease involving native coronary artery of native heart with unstable angina pectoris 01/17/2017    Deviated septum     Diabetes mellitus     Difficulty urinating     Diverticulitis     Enlarged prostate     Fatty liver     GERD (gastroesophageal reflux disease)     [place] (hard of hearing)     Does have hearing aids    Hyperlipidemia LDL goal <70 02/02/2017    Hypertrophy of nasal turbinates     Keratoderma     Kidney stone     Lung nodules     lower right lung    Migraine     Murmur, heart     Myocardial infarction     Obesity     Paroxysmal atrial fibrillation 07/11/2019    Personal history of COVID-19 07/2021    PONV (postoperative nausea and vomiting)     Primary hypertension 10/16/2016    Psoriasis     Seizures     Sinus congestion     Skin cancer     Sleep apnea     not using cpap    SOB (shortness of breath)     Stroke     mild weakness on right side    UTI (urinary tract infection)      Past Surgical History:   Procedure Laterality Date    CARDIAC CATHETERIZATION  01/2016    Dr. Broadbent; widely patent previously placed stents in the left anterior descending and obstructive disease involving the diagonal branch which was treated medically    CARDIAC CATHETERIZATION N/A 07/14/2017    Procedure: Left Heart Cath;  Surgeon: Wade Ramey MD;  Location:  [facility] CATH INVASIVE LOCATION;  Service:     CARDIAC CATHETERIZATION Left 10/15/2018    Procedure: Cardiac Catheterization/Vascular Study;  Surgeon: Wade Ramey MD;  Location:  [facility]  CATH INVASIVE LOCATION;  Service: Cardiology    CARDIAC CATHETERIZATION  10/15/2018    Procedure: Functional Flow [place];  Surgeon: Wade Ramey MD;  Location: [facility] CATH INVASIVE LOCATION;  Service: Cardiology    CARDIAC CATHETERIZATION N/A 10/15/2018    Procedure: Left ventriculography;  Surgeon: Wade Ramey MD;  Location:  [facility] CATH INVASIVE LOC[facility];  Service: Cardiology    CARDIAC CATHETERIZATION Left 06/26/2019    Procedure: Cardiac Catheterization/Vascular Study VEL OK  HE WILL WAIT 1 YEAR FOR SHOULDER SURGERY ;  Surgeon: Wade Ramey MD;  Location:  [facility] CATH INVASIVE [facility];  Service: Cardiology    CARDIAC CATHETERIZATION Left 04/30/2021    Procedure: Coronary angiography;  Surgeon: Sahil Llamas MD;  Location: [facility] CATH INVASIVE LOCATION;  Service: Cardiology;  Laterality: Left;    CARDIAC CATHETERIZATION N/A 04/30/2021    Procedure: Percutaneous Coronary Intervention;  Surgeon: Sahil Llamas MD;  Location:  [facility] CATH INVASIVE [facility];  Service: Cardiology;  Laterality: N/A;    CARDIAC CATHETERIZATION N/A 11/09/2022    Procedure: Left Heart Cath with SVGs;  Surgeon: Wade Ramey MD;  Location: [facility] CATH INVASIVE LOCATION;  Service: Cardiology;  Laterality: N/A;    CARPAL TUNNEL RELEASE      January 2024 and pther hand February 2024    CHOLECYSTECTOMY      CHOLECYSTECTOMY WITH INTRAOPERATIVE CHOLANGIOGRAM N/A 08/01/2018    Procedure: CHOLECYSTECTOMY LAPAROSCOPIC INTRAOPERATIVE CHOLANGIOGRAM;  Surgeon: Shane Ann MD;  Location: [facility] OR;  Service: General    COLONOSCOPY N/A 07/14/2020    Procedure: COLONOSCOPY WITH ANESTHESIA;  Surgeon: Anupam Morales DO;  Location: [facility] ENDOSCOPY;  Service: Gastroenterology;  Laterality: N/A;  pre: abdominal pain  post: diverticulosis  Vinayak Correia PA    CORONARY ANGIOPLASTY      CORONARY ARTERY BYPASS GRAFT N/A 07/06/2019    Procedure: CABG X2 WITH LIMA, LEFT LEG OVH, AND PLACEMENT OF LEFT FEMORAL ARTERIAL LINE;  Surgeon: Clyde  Steven RIDER MD;  Location:  [facility] OR;  Service: Cardiothoracic    CORONARY STENT PLACEMENT      x 6    CYSTOSCOPY TRANSURETHRAL RESECTION OF PROSTATE N/A 01/23/2023    Procedure: CYSTOSCOPY TRANSURETHRAL RESECTION OF PROSTATE;  Surgeon: Latrell Pope MD;  Location:  [facility] OR;  Service: Urology;  Laterality: N/A;    ENDOSCOPIC FUNCTIONAL SINUS SURGERY (FESS) Bilateral 12/13/2017    Procedure: PROCEDURE PERFORMED:  Bilateral functional endoscopic anterior ethmoidectomy with bilateral middle meatal antrostomy Septoplasty Right kathia bullosa resection Bilateral inferior turbinate reduction via Coblation;  Surgeon: Mayank Ibarra MD;  Location:  [facility] OR;  Service:     ENDOSCOPY N/A 07/30/2018    Procedure: ESOPHAGOGASTRODUODENOSCOPY WITH ANESTHESIA;  Surgeon: Benitez Mas MD;  Location: [facility] ENDOSCOPY;  Service: Gastroenterology    ENDOSCOPY N/A 07/14/2020    Procedure: ESOPHAGOGASTRODUODENOSCOPY WITH ANESTHESIA;  Surgeon: Anupam Morales DO;  Location: [facility] ENDOSCOPY;  Service: Gastroenterology;  Laterality: N/A;  pre: abdominal pain  post: esophagitis  Vinayak Correia PA    HERNIA REPAIR      x2 inguinal area    KIDNEY STONE SURGERY      KNEE ARTHROSCOPY Right 03/01/2022    Procedure: RIGHT KNEE PARTIAL LATERAL MENISCECTOMY;  Surgeon: Pedro Pablo Song MD;  Location:  [facility] OR;  Service: Orthopedics;  Laterality: Right;    KNEE SURGERY Right     OTHER SURGICAL HISTORY      urolift    PROSTATE SURGERY      Dr. Badillo - 2017    PULMONARY ARTERY PRESSURE SENSOR IMPLANT N/A 05/08/2023    Procedure: PA Pressure Sensor Implant;  Surgeon: aWde Ramey MD;  Location: [facility] CATH INVASIVE LOCATION;  Service: Cardiology;  Laterality: N/A;    ROTATOR CUFF REPAIR Right     SLEEP ENDOSCOPY N/A 02/27/2023    Procedure: Videosleep endoscopy;  Surgeon: Mayank Ibarra MD;  Location:  [facility] OR;  Service: ENT;  Laterality: N/A;    THUMB AMPUTATION Left     partial    TOE FUSION Right 7/3/2024     Procedure: Hallux Interphalangeal Joint Arthrodesis, Bone Graft [place] - Right Foot;  Surgeon: Hernan Villa DPM;  Location: [facility] OR;  Service: Podiatry;  Laterality: Right;    TOE SURGERY      great toe       Medications On Admission:  Medications Prior to Admission   Medication Sig Dispense Refill Last Dose/Taking    acetaminophen (TYLENOL) 325 MG tablet Take 1 tablet by mouth Every 6 (Six) Hours As Needed for Mild Pain.       albuterol (PROVENTIL HFA;VENTOLIN HFA) 108 (90 BASE) MCG/ACT inhaler Inhale 2 puffs Every 6 (Six) Hours As Needed for Wheezing.       apixaban (ELIQUIS) 5 MG tablet tablet Take 1 tablet by mouth 2 (Two) Times a Day.       Aspirin 81 MG capsule Take 81 mg by mouth Daily.       calcium polycarbophil (FIBERCON) 625 MG tablet Take 1 tablet by mouth Daily.       carboxymethylcellulose (REFRESH PLUS) 0.5 % solution Administer 1 drop to both eyes 4 (Four) Times a Day As Needed for Dry Eyes.       docusate sodium (COLACE) 100 MG capsule Take 1 capsule by mouth Daily As Needed for Constipation.       empagliflozin (JARDIANCE) 25 MG tablet tablet Take 1 tablet by mouth Daily.       ezetimibe (ZETIA) 10 MG tablet Take 1 tablet by mouth Daily.       fluticasone (FLONASE) 50 MCG/ACT nasal spray Administer 2 sprays into the nostril(s) as directed by provider Daily.       furosemide (Lasix) 40 MG tablet Take 1 tablet by mouth Daily As Needed (take as needed for weight gain more than 2 pounds in a day or more than than 3 pounds in 2 days.). 90 tablet 3     gabapentin (NEURONTIN) 300 MG capsule Take 1 capsule by mouth Every Night. (Patient taking differently: Take 1 capsule by mouth 2 (Two) Times a Day.)       guaiFENesin (MUCINEX) 600 MG 12 hr tablet Take 2 tablets by mouth Daily As Needed for Congestion.       HYDROcodone Bit-Homatrop MBr (HYCODAN) 5-1.5 MG/5ML solution Take 5 mL by mouth Every 4 (Four) Hours As Needed for Cough. (Patient not taking: Reported on 4/9/2025) 120 mL 0     insulin  aspart (novoLOG FLEXPEN) 100 UNIT/ML solution pen-injector sc pen Inject 50 Units under the skin into the appropriate area as directed 3 (Three) Times a Day With Meals. Can use 5 to 6 more units if needed in early evening.       INSULIN GLARGINE-YFGN SC Inject 100 Units under the skin into the appropriate area as directed 2 (Two) Times a Day.       isosorbide mononitrate (IMDUR) 120 MG 24 hr tablet Take 1 tablet by mouth Daily. 90 tablet 3     lamoTRIgine (LaMICtal) 200 MG tablet Take 1 tablet by mouth 2 (Two) Times a Day. 180 tablet 3     levETIRAcetam (KEPPRA) 500 MG tablet Take 3 tablets by mouth Every Morning. 270 tablet 3     levETIRAcetam (KEPPRA) 500 MG tablet Take 4 tablets by mouth Every Night. 360 tablet 3     metFORMIN (GLUCOPHAGE) 1000 MG tablet Take 1 tablet by mouth 2 (Two) Times a Day With Meals.       methylPREDNISolone (MEDROL) 4 MG dose pack Take as directed on package instructions. (Patient not taking: Reported on 4/9/2025) 21 tablet 0     metoprolol tartrate (LOPRESSOR) 100 MG tablet Take 1 tablet by mouth 2 (Two) Times a Day.       Multiple Vitamin (MULTI VITAMIN PO) Take 1 tablet by mouth Daily.       nitroglycerin (NITROSTAT) 0.4 MG SL tablet Place 1 tablet under the tongue Every 5 (Five) Minutes As Needed for Chest Pain. Take no more than 3 doses in 15 minutes. 25 tablet 2     ondansetron ODT (ZOFRAN-ODT) 4 MG disintegrating tablet Place 1 tablet on the tongue Every 6 (Six) Hours As Needed for Nausea or Vomiting for up to 12 doses. 12 tablet 0     pantoprazole (PROTONIX) 40 MG EC tablet Take 1 tablet by mouth 2 (Two) Times a Day.       polyethylene glycol (MIRALAX) 17 g packet Take 17 g by mouth Daily.       psyllium (METAMUCIL) 58.6 % packet Take 1 packet by mouth Daily As Needed (constipation).       Rimegepant Sulfate (Nurtec) 75 MG tablet dispersible tablet Take 1 tablet by mouth 3 (Three) Times a Day As Needed (migraine).       rimegepant sulfate ODT (Nurtec) 75 MG disintegrating tablet  Place 1 tablet under the tongue 1 (One) Time As Needed (at onset) for up to 180 days. 8 tablet 5     rosuvastatin (CRESTOR) 40 MG tablet Take 0.5 tablets by mouth Every Night.       sacubitril-valsartan (Entresto)  MG tablet Take 1 tablet by mouth 2 (Two) Times a Day.       Semaglutide,0.25 or 0.5MG/DOS, (Ozempic, 0.25 or 0.5 MG/DOSE,) 2 MG/1.5ML solution pen-injector Inject 0.5 mg under the skin into the appropriate area as directed 1 (One) Time Per Week. Monday       tamsulosin (FLOMAX) 0.4 MG capsule 24 hr capsule Take 1 capsule by mouth Daily.       triamcinolone (KENALOG) 0.1 % ointment Apply 1 Application topically to the appropriate area as directed 2 (Two) Times a Day. To feet          Current Medications:    Current Facility-Administered Medications:     acetaminophen (TYLENOL) tablet 325 mg, 325 mg, Oral, Q6H PRN, Vish Latham MD, 325 mg at 04/26/25 2038    albuterol (PROVENTIL) nebulizer solution 0.083% 2.5 mg/3mL, 2.5 mg, Nebulization, Q6H PRN, Vish Latham MD    apixaban (ELIQUIS) tablet 5 mg, 5 mg, Oral, BID, Vish Latham MD, 5 mg at 04/26/25 2039    aspirin EC tablet 81 mg, 81 mg, Oral, Daily, Vish Latham MD    sennosides-docusate (PERICOLACE) 8.6-50 MG per tablet 2 tablet, 2 tablet, Oral, BID PRN **AND** polyethylene glycol (MIRALAX) packet 17 g, 17 g, Oral, Daily PRN **AND** bisacodyl (DULCOLAX) EC tablet 5 mg, 5 mg, Oral, Daily PRN **AND** bisacodyl (DULCOLAX) suppository 10 mg, 10 mg, Rectal, Daily PRN, Vish Latham MD    cefTRIAXone (ROCEPHIN) 1,000 mg in sodium chloride 0.9 % 100 mL MBP, 1,000 mg, Intravenous, Q24H, Vish Latham MD, Last Rate: 200 mL/hr at 04/26/25 2040, 1,000 mg at 04/26/25 2040    dextrose (D50W) (25 g/50 mL) IV injection 25 g, 25 g, Intravenous, Q15 Min PRN, Vish Latham MD    dextrose (GLUTOSE) oral gel 15 g, 15 g, Oral, Q15 Min PRN, Vish Latham MD    docusate sodium (COLACE) capsule 100 mg, 100 mg, Oral, Daily PRN, Vish Latham MD     empagliflozin (JARDIANCE) tablet 25 mg, 25 mg, Oral, Daily, Vish Latham MD    fluticasone (FLONASE) 50 MCG/ACT nasal spray 2 spray, 2 spray, Nasal, Daily, Vish Latham MD    furosemide (LASIX) tablet 40 mg, 40 mg, Oral, Daily PRN, Vish Latham MD    gabapentin (NEURONTIN) capsule 300 mg, 300 mg, Oral, Nightly, Vish Latham MD, 300 mg at 04/26/25 2040    glucagon (GLUCAGEN) injection 1 mg, 1 mg, Intramuscular, Q15 Min PRN, Vish Latham MD    guaiFENesin (MUCINEX) 12 hr tablet 1,200 mg, 1,200 mg, Oral, Daily PRN, Vish Latham MD    insulin glargine (LANTUS, SEMGLEE) injection 25 Units, 25 Units, Subcutaneous, Q12H, Vish Latham MD, 25 Units at 04/26/25 2041    Insulin Lispro (humaLOG) injection 2-9 Units, 2-9 Units, Subcutaneous, 4x Daily AC & at Bedtime, Vish Latham MD, 2 Units at 04/27/25 0805    isosorbide mononitrate (IMDUR) 24 hr tablet 120 mg, 120 mg, Oral, Daily, Vish Latham MD    lamoTRIgine (LaMICtal) tablet 200 mg, 200 mg, Oral, BID, Vish Latham MD, 200 mg at 04/26/25 2039    levETIRAcetam (KEPPRA) tablet 1,500 mg, 1,500 mg, Oral, Daily, Vish Latham MD    levETIRAcetam (KEPPRA) tablet 2,000 mg, 2,000 mg, Oral, Nightly, Vish Latham MD, 2,000 mg at 04/26/25 2039    LORazepam (ATIVAN) injection 1 mg, 1 mg, Intravenous, Q2H PRN, Vish Latham MD    metoprolol tartrate (LOPRESSOR) tablet 100 mg, 100 mg, Oral, BID, Vish Latham MD, 100 mg at 04/26/25 2039    nitroglycerin (NITROSTAT) SL tablet 0.4 mg, 0.4 mg, Sublingual, Q5 Min PRN, Vish Latham MD    ondansetron (ZOFRAN) injection 4 mg, 4 mg, Intravenous, Q6H PRN, Vish Latham MD    pantoprazole (PROTONIX) EC tablet 40 mg, 40 mg, Oral, BID, Vish Latham MD, 40 mg at 04/26/25 2039    polyethylene glycol (MIRALAX) packet 17 g, 17 g, Oral, Daily, Vish Latham MD, 17 g at 04/26/25 2038    rosuvastatin (CRESTOR) tablet 20 mg, 20 mg, Oral, Nightly, Vish Latham MD, 20 mg at 04/26/25 2039     sacubitril-valsartan (ENTRESTO)  MG tablet 1 tablet, 1 tablet, Oral, BID, Vish Latham MD, 1 tablet at 25    sodium chloride 0.9 % flush 10 mL, 10 mL, Intravenous, Q12H, Vish Latham MD, 10 mL at 25    sodium chloride 0.9 % flush 10 mL, 10 mL, Intravenous, PRN, Vish Latham MD    sodium chloride 0.9 % infusion 40 mL, 40 mL, Intravenous, PRN, Vish Latham MD    tamsulosin (FLOMAX) 24 hr capsule 0.4 mg, 0.4 mg, Oral, Daily, Vish Latham MD     Allergies:  Allergies   Allergen Reactions    Flagyl [Metronidazole] Hives    Atorvastatin Other (See Comments) and Myalgia      - LIPITOR -   Muscle cramps    Ciprofloxacin Hives      - CIPRO -        Social Hx:  Social History     Socioeconomic History    Marital status:    Tobacco Use    Smoking status: Former     Current packs/day: 0.00     Average packs/day: 1.5 packs/day for 18.0 years (27.0 ttl pk-yrs)     Types: Cigarettes, Cigars     Start date:      Quit date:      Years since quittin.3     Passive exposure: Past    Smokeless tobacco: Former     Types: Chew     Quit date:    Vaping Use    Vaping status: Never Used   Substance and Sexual Activity    Alcohol use: No     Comment: 0    Drug use: No    Sexual activity: Defer       Family Hx:  Family History   Problem Relation Age of Onset    Heart disease Father     COPD Mother     Hypertension Mother     Asthma Mother     No Known Problems Sister     Colon cancer Paternal Uncle     Prostate cancer Maternal Grandfather     No Known Problems Sister     Colon cancer Maternal Grandmother     Colon polyps Maternal Grandmother      Physical Examination:   Vital Signs:  Vitals:    25 1839 25 2331 25 0430 25 0830   BP: 150/74 108/56 141/75 129/68   BP Location: Left arm Left arm Left arm Left arm   Patient Position: Lying Lying Lying Sitting   Pulse: 87 69 80 72   Resp: 24 18 18 18   Temp: (!) 101.5 °F (38.6 °C) 98.2 °F (36.8 °C) 98.3 °F  (36.8 °C) 99.1 °F (37.3 °C)   TempSrc: Oral Oral Oral Oral   SpO2: 98% 93% 97% 92%   Weight:   111 kg (244 lb 4.3 oz)    Height:             General Exam:  Head:  Normocephalic, atraumatic  HEENT:  Neck supple  CVS:  Regular rate and rhythm.  No murmurs  Carotid Examination:  No bruits  Lungs:  Clear to auscultation  Abdomen:  Nontender, Nondistended  Extremities:  No signs of peripheral edema. Extremely dry skin 3rd right toe with necrosis with redness on toe radiating up foot.   Skin:  No rashes    Neurologic Exam:    Mental Status:    -Awake, Alert, Oriented X 3  -No word finding difficulties  -No aphasia  -No dysarthria  -Follows simple and complex commands    CN II:  Visual fields full.  Pupils equally reactive to light  CN III, IV, VI:  Extraocular Muscles full with no signs of nystagmus  CN V:  Facial sensory is symmetric with no asymmetries.  CN VII:  Facial motor symmetric  CN VIII:  Gross hearing intact bilaterally  CN IX:  Palate elevates symmetrically  CN X:  Palate elevates symmetrically  CN XI:  Shoulder shrug symmetric  CN XII:  Tongue is midline on protrusion    Motor: (strength out of 5:  1= minimal movement, 2 = movement in plane of gravity, 3 = movement against gravity, 4 = movement against some resistance, 5 = full strength)    -Right Upper Ext: Proximal: 5 Distal: 5  -Left Upper Ext: Proximal: 5 Distal: 5    -Right Lower Ext: Proximal: 5 Distal: 5  -Left Lower Ext: Proximal: 5 Distal: 5    DTR:  -Right   Biceps: 2+ Triceps: 2+ Brachioradialis: 2+   Patella: 2+ Ankle: 2+ Neg Babinski  -Left   Biceps: 2+ Triceps: 2+ Brachioradialis: 2+   Patella: 2+ Ankle: 2+ Neg Babinski    Sensory:  -Intact to light touch, pinprick, temperature, pain, and proprioception    Coordination:  -Finger to nose intact  -Heel to shin intact  -No ataxia    Gait  -not tested.     Recent Diagnostics:   Laboratory Results:   - Reviewed in EMR  Lab Results   Component Value Date    GLUCOSE 177 (H) 04/27/2025    CALCIUM 8.9  04/27/2025     04/27/2025    K 4.9 04/27/2025    CO2 23.0 04/27/2025    CL 99 04/27/2025    BUN 15 04/27/2025    CREATININE 1.02 04/27/2025    EGFRIFAFRI 27.5 (L) 08/25/2023    EGFRIFNONA 22.7 (L) 08/25/2023    BCR 14.7 04/27/2025    ANIONGAP 14.0 04/27/2025     Lab Results   Component Value Date    WBC 5.72 04/27/2025    HGB 15.2 04/27/2025    HCT 46.9 04/27/2025    MCV 93.2 04/27/2025    PLT 73 (L) 04/27/2025     Lab Results   Component Value Date    PTT 30.5 09/18/2024    INR 1.00 09/18/2024     Lab Results   Component Value Date    TRIG 115 04/27/2025    HDL 30 (L) 04/27/2025    LDL 46 04/27/2025     Lab Results   Component Value Date    HGBA1C 7.60 (H) 04/27/2025       Imaging Results:  Imaging Results (Last 24 Hours)       Procedure Component Value Units Date/Time    CT Head Without Contrast [[id number]] Collected: 04/26/25 1658     Updated: 04/26/25 1703    Narrative:      EXAMINATION: CT HEAD WO CONTRAST-  4/26/2025 4:58 PM     HISTORY: seizures     COMPARISON: 9/21/2023     DLP: 1069.19 mGy.cm     In order to have a CT radiation dose as low as reasonably achievable,  Automated Exposure Control was utilized for adjustment of the mA and/or  KV according to patient size.     TECHNIQUE: Serial axial tomographic images of the brain were obtained  without the use of intravenous contrast.  Coronal and sagittal  reformatted images were obtained reviewed as well.     FINDINGS:  No mass effect or midline shift. No acute hemorrhage. There is mild  low-attenuation in the subcortical and periventricular white matter as  can be seen with chronic microvascular change. Vascular calcifications  in the vertebral arteries in the intracranial portions of the carotid  arteries.     Surrounding soft tissues are without acute findings. Orbits and globes  are preserved. Paranasal sinus disease with an air-fluid level in the  RIGHT maxillary sinus suggesting mild acute sinusitis. Mild scattered  mucosal thickening in the  ethmoid air cells. Small mastoid effusion on  the RIGHT, unchanged from 2023 and likely chronic.          Impression:         1.  Mild atrophy and chronic white matter changes but no definite acute  intracranial process.     2.  Paranasal sinus disease as discussed above.     This report was signed and finalized on 4/26/2025 5:00 PM by Dr. Valentino Lebron MD.                      Assessment & Plan:   Patient with PMH PAF on Eliquis, cardiac stent, carotid disease, CHF, seizure disorder, HOA with CPAP, aortic aneurysm, migraines. Patient recently saw Dr. Tang and CTA chest 4/9 showed metallic object in left pulmonary artery with no recent medical procedure and object was seen on CT scans 7/18/2023 and no seen on previous CTA 4/27/2023. . Seizure and migraines followed by REJI DICKINSON last seen on 4/9/2025.    Patient reports recent tick bite on 4/20/2025. Patient denies recent illness. His last breakthrough seizure was more than 3 years ago. He denies missing doses of Keppra 1500 mg BID and lamictal 200mg BID. Patient also takes Gabapentin 300 mg at HS.   Patient presented to [facility] ED on 4/25 and 4/26 with recurrent breakthroug seizure and treated as above.     Active Hospital Problems    Diagnosis  POA    **Recurrent seizures [G40.909]  Yes    Chronic heart failure with preserved ejection fraction (HFpEF) [I50.32]  Yes    Presence of CardioMEMS HF system [Z95.818]  Yes    S/P CABG x 2 [Z95.1]  Not Applicable     WHITLOCK-LAD, VG-OM '19 ([place])      Chronic diastolic congestive heart failure with preserved ejection fraction [I50.32]  Yes    Atrial flutter [I48.92]  Yes    Class 1 obesity due to excess calories with serious comorbidity and body mass index (BMI) of 34.0 to 34.9 in adult [E66.811, E66.09, Z68.34]  Not Applicable    Diabetic peripheral neuropathy [E11.42]  Yes    Diabetic complication [E11.8]  Yes    Deviated nasal septum [J34.2]  Yes    Maribell bullosa [J34.89]  Yes    Benign non-nodular prostatic  hyperplasia with lower urinary tract symptoms [N40.1]  Yes     Added automatically from request for surgery [id number]      Gastroesophageal reflux disease without esophagitis [K21.9]  Yes    Type 2 diabetes mellitus with circulatory disorder, with long-term current use of insulin [E11.59, Z79.4]  Not Applicable      Impression:  Breakthroug seizure  FUO  Chronic appearing wound 3rd right toe with redness.  PAF on chronic anticoagulation.  HOA with PAP  Ascending aortic aneurysm   CHF with Presence of CARDIOMEMS placed 5/2023.   CTA chest 4/9 showed metallic object in left pulmonary artery with no recent medical procedure and object was seen on CT scans 7/18/2023 and no seen on previous CTA 4/27/2023.   Thrombocytopenia  PAF on chronic anticoagulation      Plan:   Continue home Keppra and lamictal  Keppra and lamictal level pending  Blood culture pending  Tick panel, alpha gal, lyme   Xray right foot.  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 And would need to follow MRI protocal.  Discussed care with Dr. Latham.   I did speak to Dr. BERENICE Durán, and he explained that MRI would be capable with MRI conditional and per MRI protocol.     Usha Hammer, APRN  04/27/25  09:19 CDT    Medical Decision Making    Number/Complexity of Problems  Moderate  1 undiagnosed new problem with uncertain prognosis -   1 acute illness with systemic symptoms -   High  1 acute or chronic illness that poses a threat to life/body function -   high     MDM Data  Moderate - 1/3 categories  Extensive - 2/3 categories    Category 1: 3 of the following  Review of external notes  Review of results  Ordering of each unique test  Independent historian  Category 2:  Independent interpretation of test (ex: imaging)  Category 3:  Discussion of management with another provider    extensive     Treatment Plan  Moderate - Prescription Drug  management  High  Drug therapy requiring intensive monitoring for toxicity  Decision regarding hospitalization or escalation of care  De-escalate care/DNR decisions  high

## 2025-04-27 NOTE — PLAN OF CARE
Goal Outcome Evaluation:  Plan of Care Reviewed With: patient, spouse        Progress: no change     Alert and oriented x4 but forgetful at times. IV antibiotics continue. VSS. Safety maintained.

## 2025-04-27 NOTE — PLAN OF CARE
Goal Outcome Evaluation:         Patient A&Ox4, VSS, RA. No seizure activity noted. Patient up with stand by asisst. No c/o pain or discomfort. OT working with patient. All safety maintained and call light in reach.

## 2025-04-27 NOTE — PLAN OF CARE
Goal Outcome Evaluation:  Plan of Care Reviewed With: patient, spouse        Progress: no change  Outcome Evaluation: OT eval completed. Pt seated EOB upon therapist arrival; A&Ox4; No c/o pain; Pt's wife also present. Pt reports Mod I-I with all BADLs/IADLs including fxl ambulation at baseline. Today, Pt donned B slippers with SBA. Pt performed sit<>stand utilizing no AD with CGA and verbal cues for safety awareness. Pt observed to have had incontinent episode of bowel of which he was unaware. Pt required Max A to perform posterior marli hygiene in standing. Pt ambulated short distance in hallway utilizing no AD with CGA. No focal neuro deficits observed. Skilled OT intervention indicated in order to address deficits in fxl mobility, fxl activity tolerance, balance, strength, and use of adaptive techniques/equipment during performance of BADLs. Recommend home with assist at discharge.    Anticipated Discharge Disposition (OT): home with assist

## 2025-04-27 NOTE — THERAPY EVALUATION
Patient Name: Carlos A Boyer Jr.  : 1960    MRN: [id number]                              Today's Date: 2025       Admit Date: 2025    Visit Dx:     ICD-10-CM ICD-9-CM   1. Recurrent seizures  G40.909 345.80     Patient Active Problem List   Diagnosis    Type 2 diabetes mellitus with circulatory disorder, with long-term current use of insulin    Gastroesophageal reflux disease without esophagitis    Primary hypertension    Seizure disorder    Carotid disease, bilateral    Coronary artery disease involving native coronary artery of native heart without angina pectoris    Hyperlipidemia LDL goal <70    Chronic left arterial ischemic stroke, ICA (internal carotid artery)    HOA on CPAP    Benign non-nodular prostatic hyperplasia with lower urinary tract symptoms    Deviated nasal septum    Maribell bullosa    Hypertrophy of both inferior nasal turbinates    Chronic sinusitis of both maxillary sinuses    S/P FESS (functional endoscopic sinus surgery)    Diabetic complication    Epigastric pain    Diabetic peripheral neuropathy    Class 1 obesity due to excess calories with serious comorbidity and body mass index (BMI) of 34.0 to 34.9 in adult    Aspirin-like platelet function defect    History of seizure    Lung nodules    Cardiac murmur    Thoracic aortic aneurysm without rupture    Paroxysmal atrial fibrillation    Atrial flutter    Chronic diastolic congestive heart failure with preserved ejection fraction    Fluid overload    Bilateral pleural effusion    Respiratory distress    Long-term use of high-risk medication    Dysphagia    Left lower quadrant abdominal pain    COVID-19 virus detected    Shortness of breath    S/P CABG x 2    Chronic anticoagulation    Old complex tear of lateral meniscus of right knee    BPH with obstruction/lower urinary tract symptoms    Daytime somnolence    Snoring    Aneurysm of ascending aorta without rupture    Presence of CardioMEMS HF system    Chest pain     Non-pressure chronic ulcer of other part of right foot with fat layer exposed    Diabetic ulcer of toe of right foot associated with type 2 diabetes mellitus, with fat layer exposed    Deformity, toe acquired, right    Cellulitis of right toe    Chronic heart failure with preserved ejection fraction (HFpEF)    Recurrent seizures     Past Medical History:   Diagnosis Date    Arthritis     Asthma     Cancer     skin    Carotid disease, bilateral     Cellulitis     right foot - on antibiotics 6-    Chest pain     Chronic diastolic congestive heart failure 09/07/2019    Chronic sinusitis     Maribell bullosa     Coronary artery disease involving native coronary artery of native heart with unstable angina pectoris 01/17/2017    Deviated septum     Diabetes mellitus     Difficulty urinating     Diverticulitis     Enlarged prostate     Fatty liver     GERD (gastroesophageal reflux disease)     [place] (hard of hearing)     Does have hearing aids    Hyperlipidemia LDL goal <70 02/02/2017    Hypertrophy of nasal turbinates     Keratoderma     Kidney stone     Lung nodules     lower right lung    Migraine     Murmur, heart     Myocardial infarction     Obesity     Paroxysmal atrial fibrillation 07/11/2019    Personal history of COVID-19 07/2021    PONV (postoperative nausea and vomiting)     Primary hypertension 10/16/2016    Psoriasis     Seizures     Sinus congestion     Skin cancer     Sleep apnea     not using cpap    SOB (shortness of breath)     Stroke     mild weakness on right side    UTI (urinary tract infection)      Past Surgical History:   Procedure Laterality Date    CARDIAC CATHETERIZATION  01/2016    Dr. Broadbent; widely patent previously placed stents in the left anterior descending and obstructive disease involving the diagonal branch which was treated medically    CARDIAC CATHETERIZATION N/A 07/14/2017    Procedure: Left Heart Cath;  Surgeon: Wade Ramey MD;  Location:  [facility];   Service:     CARDIAC CATHETERIZATION Left 10/15/2018    Procedure: Cardiac Catheterization/Vascular Study;  Surgeon: Wade Ramey MD;  Location:  [facility];  Service: Cardiology    CARDIAC CATHETERIZATION  10/15/2018    Procedure: Functional Flow [place];  Surgeon: Wade Ramey MD;  Location:  [facility];  Service: Cardiology    CARDIAC CATHETERIZATION N/A 10/15/2018    Procedure: Left ventriculography;  Surgeon: Wade Ramye MD;  Location:  [facility];  Service: Cardiology    CARDIAC CATHETERIZATION Left 06/26/2019    Procedure: Cardiac Catheterization/Vascular Study VEL OK  HE WILL WAIT 1 YEAR FOR SHOULDER SURGERY ;  Surgeon: Wade Ramey MD;  Location:  [facility];  Service: Cardiology    CARDIAC CATHETERIZATION Left 04/30/2021    Procedure: Coronary angiography;  Surgeon: Sahil Llamas MD;  Location:  [facility];  Service: Cardiology;  Laterality: Left;    CARDIAC CATHETERIZATION N/A 04/30/2021    Procedure: Percutaneous Coronary Intervention;  Surgeon: Sahil Llamas MD;  Location:  [facility];  Service: Cardiology;  Laterality: N/A;    CARDIAC CATHETERIZATION N/A 11/09/2022    Procedure: Left Heart Cath with SVGs;  Surgeon: Wade Ramey MD;  Location:  [facility];  Service: Cardiology;  Laterality: N/A;    CARPAL TUNNEL RELEASE      January 2024 and pther hand February 2024    CHOLECYSTECTOMY      CHOLECYSTECTOMY WITH INTRAOPERATIVE CHOLANGIOGRAM N/A 08/01/2018    Procedure: CHOLECYSTECTOMY LAPAROSCOPIC INTRAOPERATIVE CHOLANGIOGRAM;  Surgeon: Shane Ann MD;  Location: [facility] OR;  Service: General    COLONOSCOPY N/A 07/14/2020    Procedure: COLONOSCOPY WITH ANESTHESIA;  Surgeon: Anupam Morales DO;  Location: [facility] ENDOSCOPY;  Service: Gastroenterology;  Laterality: N/A;  pre: abdominal pain  post: diverticulosis  Vinayak Correia PA    CORONARY ANGIOPLASTY       CORONARY ARTERY BYPASS GRAFT N/A 07/06/2019    Procedure: CABG X2 WITH LIMA, LEFT LEG OVH, AND PLACEMENT OF LEFT FEMORAL ARTERIAL LINE;  Surgeon: Steven Tang MD;  Location:  [facility] OR;  Service: Cardiothoracic    CORONARY STENT PLACEMENT      x 6    CYSTOSCOPY TRANSURETHRAL RESECTION OF PROSTATE N/A 01/23/2023    Procedure: CYSTOSCOPY TRANSURETHRAL RESECTION OF PROSTATE;  Surgeon: Latrell Pope MD;  Location:  [facility] OR;  Service: Urology;  Laterality: N/A;    ENDOSCOPIC FUNCTIONAL SINUS SURGERY (FESS) Bilateral 12/13/2017    Procedure: PROCEDURE PERFORMED:  Bilateral functional endoscopic anterior ethmoidectomy with bilateral middle meatal antrostomy Septoplasty Right kathia bullosa resection Bilateral inferior turbinate reduction via Coblation;  Surgeon: Mayank Ibarra MD;  Location:  [facility] OR;  Service:     ENDOSCOPY N/A 07/30/2018    Procedure: ESOPHAGOGASTRODUODENOSCOPY WITH ANESTHESIA;  Surgeon: Benitez Mas MD;  Location: [facility] ENDOSCOPY;  Service: Gastroenterology    ENDOSCOPY N/A 07/14/2020    Procedure: ESOPHAGOGASTRODUODENOSCOPY WITH ANESTHESIA;  Surgeon: Anupam Morales DO;  Location:  [facility];  Service: Gastroenterology;  Laterality: N/A;  pre: abdominal pain  post: esophagitis  Vinayak Correia, PA    HERNIA REPAIR      x2 inguinal area    KIDNEY STONE SURGERY      KNEE ARTHROSCOPY Right 03/01/2022    Procedure: RIGHT KNEE PARTIAL LATERAL MENISCECTOMY;  Surgeon: Pedro Pablo Song MD;  Location:  [facility] OR;  Service: Orthopedics;  Laterality: Right;    KNEE SURGERY Right     OTHER SURGICAL HISTORY      urolift    PROSTATE SURGERY      Dr. Badillo - 2017    PULMONARY ARTERY PRESSURE SENSOR IMPLANT N/A 05/08/2023    Procedure: PA Pressure Sensor Implant;  Surgeon: Wade Ramey MD;  Location: [facility] CATH INVASIVE LOCATION;  Service: Cardiology;  Laterality: N/A;    ROTATOR CUFF REPAIR Right     SLEEP ENDOSCOPY N/A 02/27/2023    Procedure: Videosleep endoscopy;   Surgeon: Mayank Ibarra MD;  Location:  [facility] OR;  Service: ENT;  Laterality: N/A;    THUMB AMPUTATION Left     partial    TOE FUSION Right 7/3/2024    Procedure: Hallux Interphalangeal Joint Arthrodesis, Bone Graft [place] - Right Foot;  Surgeon: Hernan Villa DPM;  Location:  [facility] OR;  Service: Podiatry;  Laterality: Right;    TOE SURGERY      great toe      General Information       Row Name 04/27/25 1402          OT Time and Intention    Subjective Information no complaints  -LS     Document Type evaluation  cc: Breakthrough seizure. Dx: Recurrent seizures. H/o CVA, HFpEF, s/p CABG x2  -LS     Mode of Treatment occupational therapy  -LS     Patient Effort good  -LS       Row Name 04/27/25 1402          General Information    Patient Profile Reviewed yes  -LS     Prior Level of Function independent:;ADL's;all household mobility;home management;cooking;cleaning;shopping;driving  -LS     Existing Precautions/Restrictions fall;seizures  -LS     Barriers to Rehab medically complex;previous functional deficit  -LS       Row Name 04/27/25 1402          Occupational Profile    Environmental Supports and Barriers (Occupational Profile) Tub shower. Other AD/DME: rwx, [place]  -LS       Row Name 04/27/25 1402          Living Environment    Current Living Arrangements home  -LS     People in Home spouse  -LS       Row Name 04/27/25 1402          Home Main Entrance    Number of Stairs, Main Entrance five  -LS     Stair Railings, Main Entrance railings on both sides of stairs  -LS       Row Name 04/27/25 1402          Stairs Within Home, Primary    Number of Stairs, Within Home, Primary none  -LS       Row Name 04/27/25 1402          Cognition    Orientation Status (Cognition) oriented x 4  -LS       Row Name 04/27/25 1402          Safety Issues/Impairments Affecting Functional Mobility    Safety Issues Affecting Function (Mobility) insight into deficits/self-awareness;safety precaution awareness;safety precautions  follow-through/compliance  -     Impairments Affecting Function (Mobility) balance;endurance/activity tolerance;strength  -               User Key  (r) = Recorded By, (t) = Taken By, (c) = Cosigned By      Initials Name Provider Type    Charissa Maldonado OTR/L Occupational Therapist                     Mobility/ADL's       Row Name 04/27/25 1402          Transfers    Transfers sit-stand transfer;stand-sit transfer  -       Row Name 04/27/25 1402          Sit-Stand Transfer    Sit-Stand [place] (Transfers) contact guard;verbal cues  -       Row Name 04/27/25 1402          Stand-Sit Transfer    Stand-Sit [place] (Transfers) contact guard;verbal cues  -       Row Name 04/27/25 1402          Functional Mobility    Functional Mobility- Ind. Level contact guard assist  -       Row Name 04/27/25 1402          Activities of Daily Living    BADL Assessment/Intervention lower body dressing;toileting  -       Row Name 04/27/25 1402          Lower Body Dressing Assessment/Training    [place] Level (Lower Body Dressing) don;shoes/slippers;standby assist  -     Position (Lower Body Dressing) edge of bed sitting  -       Row Name 04/27/25 1402          Toileting Assessment/Training    [place] Level (Toileting) toileting skills;perform perineal hygiene;maximum assist (25% patient effort)  -LS     Position (Toileting) unsupported standing  -               User Key  (r) = Recorded By, (t) = Taken By, (c) = Cosigned By      Initials Name Provider Type    Charissa Maldonado OTR/L Occupational Therapist                   Obj/Interventions       Row Name 04/27/25 1402          Sensory Assessment (Somatosensory)    Sensory Assessment (Somatosensory) UE sensation intact  -       Row Name 04/27/25 1402          Vision Assessment/Intervention    Visual Impairment/Limitations WFL  -       Row Name 04/27/25 1402          Range of Motion Comprehensive    General Range of Motion bilateral upper  extremity ROM WFL  -LS       Row Name 04/27/25 1402          Strength Comprehensive (MMT)    Comment, General Manual Muscle Testing (MMT) Assessment BUE strength grossly 4+/5  -LS       Row Name 04/27/25 1402          Motor Skills    Motor Skills coordination  -LS     Coordination bilateral;upper extremity;WNL  -LS       Row Name 04/27/25 1402          Balance    Balance Assessment sitting static balance;sitting dynamic balance;standing static balance;standing dynamic balance  -LS     Static Sitting Balance independent  -LS     Dynamic Sitting Balance independent  -LS     Position, Sitting Balance sitting edge of bed;unsupported  -LS     Static Standing Balance contact guard  -LS     Dynamic Standing Balance contact guard  -LS     Position/Device Used, Standing Balance unsupported  -LS               User Key  (r) = Recorded By, (t) = Taken By, (c) = Cosigned By      Initials Name Provider Type    Charissa Maldonado, OTR/L Occupational Therapist                   Goals/Plan       Row Name 04/27/25 1402          Transfer Goal 1 (OT)    Activity/Assistive Device (Transfer Goal 1, OT) toilet;tub  -LS     [place] Level/Cues Needed (Transfer Goal 1, OT) modified independence  -LS     Time Frame (Transfer Goal 1, OT) long term goal (LTG);10 days  -LS     Progress/Outcome (Transfer Goal 1, OT) new goal  -LS       Row Name 04/27/25 1402          Bathing Goal 1 (OT)    Activity/Device (Bathing Goal 1, OT) bathing skills, all  -LS     [place] Level/Cues Needed (Bathing Goal 1, OT) modified independence  -LS     Time Frame (Bathing Goal 1, OT) long term goal (LTG);10 days  -LS     Progress/Outcomes (Bathing Goal 1, OT) new goal  -LS       Row Name 04/27/25 1402          Dressing Goal 1 (OT)    Activity/Device (Dressing Goal 1, OT) dressing skills, all  -LS     [place]/Cues Needed (Dressing Goal 1, OT) modified independence  -LS     Time Frame (Dressing Goal 1, OT) long term goal (LTG);10 days  -LS      Progress/Outcome (Dressing Goal 1, OT) new goal  -LS       Row Name 04/27/25 1402          Therapy Assessment/Plan (OT)    Planned Therapy Interventions (OT) activity tolerance training;functional balance retraining;occupation/activity based interventions;ROM/therapeutic exercise;strengthening exercise;transfer/mobility retraining;patient/caregiver education/training;adaptive equipment training;BADL retraining  -               User Key  (r) = Recorded By, (t) = Taken By, (c) = Cosigned By      Initials Name Provider Type    LS Charissa Goyal OTR/L Occupational Therapist                   Clinical Impression       Row Name 04/27/25 1402          Pain Assessment    Pretreatment Pain Rating 0/10 - no pain  -LS     Posttreatment Pain Rating 0/10 - no pain  -LS       Row Name 04/27/25 1402          Plan of Care Review    Plan of Care Reviewed With patient;spouse  -     Progress no change  -     Outcome Evaluation OT eval completed. Pt seated EOB upon therapist arrival; A&Ox4; No c/o pain; Pt's wife also present. Pt reports Mod I-I with all BADLs/IADLs including fxl ambulation at baseline. Today, Pt donned B slippers with SBA. Pt performed sit<>stand utilizing no AD with CGA and verbal cues for safety awareness. Pt observed to have had incontinent episode of bowel of which he was unaware. Pt required Max A to perform posterior marli hygiene in standing. Pt ambulated short distance in hallway utilizing no AD with CGA. No focal neuro deficits observed. Skilled OT intervention indicated in order to address deficits in fxl mobility, fxl activity tolerance, balance, strength, and use of adaptive techniques/equipment during performance of BADLs. Recommend home with assist at discharge.  -       Row Name 04/27/25 1402          Therapy Assessment/Plan (OT)    Rehab Potential (OT) good  -LS     Criteria for Skilled Therapeutic Interventions Met (OT) yes;skilled treatment is necessary  -LS     Therapy Frequency (OT) 5  times/wk  -       Row Name 04/27/25 1402          Therapy Plan Review/Discharge Plan (OT)    Anticipated Discharge Disposition (OT) home with assist  -       Row Name 04/27/25 1402          Positioning and Restraints    Pre-Treatment Position in bed  -LS     Post Treatment Position bed  -LS     In Bed sitting EOB;call light within reach;encouraged to call for assist;exit alarm on;side rails up x2  -LS               User Key  (r) = Recorded By, (t) = Taken By, (c) = Cosigned By      Initials Name Provider Type    Charissa Maldonado OTR/L Occupational Therapist                   Outcome Measures       Row Name 04/27/25 1402          How much help from another is currently needed...    Putting on and taking off regular lower body clothing? 3  -LS     Bathing (including washing, rinsing, and drying) 3  -LS     Toileting (which includes using toilet bed pan or urinal) 3  -LS     Putting on and taking off regular upper body clothing 4  -LS     Taking care of personal grooming (such as brushing teeth) 4  -LS     Eating meals 4  -LS     AM-PAC 6 Clicks Score (OT) 21  -       Row Name 04/27/25 1402          Functional Assessment    Outcome Measure Options AM-PAC 6 Clicks Daily Activity (OT)  -LS               User Key  (r) = Recorded By, (t) = Taken By, (c) = Cosigned By      Initials Name Provider Type    Charissa Maldonado OTR/L Occupational Therapist                    Occupational Therapy Education       Title: PT OT SLP Therapies (In Progress)       Topic: Occupational Therapy (In Progress)       Point: ADL training (Done)       Learning Progress Summary            Patient Acceptance, E, VU,NR by  at 4/27/2025 1429                      Point: Precautions (Done)       Learning Progress Summary            Patient Acceptance, E, VU,NR by  at 4/27/2025 1429                                      User Key       Initials Effective Dates Name Provider Type [facility] 06/20/22 -  Charissa Goyal OTR/SUHAIL Occupational  Therapist OT                  OT Recommendation and Plan  Planned Therapy Interventions (OT): activity tolerance training, functional balance retraining, occupation/activity based interventions, ROM/therapeutic exercise, strengthening exercise, transfer/mobility retraining, patient/caregiver education/training, adaptive equipment training, BADL retraining  Therapy Frequency (OT): 5 times/wk  Plan of Care Review  Plan of Care Reviewed With: patient, spouse  Progress: no change  Outcome Evaluation: OT eval completed. Pt seated EOB upon therapist arrival; A&Ox4; No c/o pain; Pt's wife also present. Pt reports Mod I-I with all BADLs/IADLs including fxl ambulation at baseline. Today, Pt donned B slippers with SBA. Pt performed sit<>stand utilizing no AD with CGA and verbal cues for safety awareness. Pt observed to have had incontinent episode of bowel of which he was unaware. Pt required Max A to perform posterior marli hygiene in standing. Pt ambulated short distance in hallway utilizing no AD with CGA. No focal neuro deficits observed. Skilled OT intervention indicated in order to address deficits in fxl mobility, fxl activity tolerance, balance, strength, and use of adaptive techniques/equipment during performance of BADLs. Recommend home with assist at discharge.     Time Calculation:         Time Calculation- OT       Row Name 04/27/25 1402             Time Calculation- OT    OT Start Time 1402  +5 minutes chart review  -      OT Stop Time 1421  -      OT Time Calculation (min) 19 min  -      OT Non-Billable Time (min) 24 min  -      OT Received On 04/27/25  -      OT Goal Re-Cert Due Date 05/07/25  -                User Key  (r) = Recorded By, (t) = Taken By, (c) = Cosigned By      Initials Name Provider Type    Charissa Maldonado, OTR/L Occupational Therapist                  Therapy Charges for Today       Code Description Service Date Service Provider Modifiers Qty    [id number]  OT EVAL MOD  COMPLEXITY 2 4/27/2025 Charissa Goyal, OTR/L GO 1                 Charissa Goyal, OTR/L  4/27/2025

## 2025-04-28 ENCOUNTER — APPOINTMENT (OUTPATIENT)
Dept: MRI IMAGING | Facility: HOSPITAL | Age: 65
End: 2025-04-28
Payer: OTHER GOVERNMENT

## 2025-04-28 LAB
ALBUMIN SERPL-MCNC: 3.4 G/DL (ref 3.5–5.2)
ALBUMIN/GLOB SERPL: 1.2 G/DL
ALP SERPL-CCNC: 59 U/L (ref 39–117)
ALT SERPL W P-5'-P-CCNC: 23 U/L (ref 1–41)
ANION GAP SERPL CALCULATED.3IONS-SCNC: 11 MMOL/L (ref 5–15)
AST SERPL-CCNC: 27 U/L (ref 1–40)
BILIRUB SERPL-MCNC: 0.9 MG/DL (ref 0–1.2)
BUN SERPL-MCNC: 20 MG/DL (ref 8–23)
BUN/CREAT SERPL: 21.5 (ref 7–25)
CALCIUM SPEC-SCNC: 8.6 MG/DL (ref 8.6–10.5)
CHLORIDE SERPL-SCNC: 104 MMOL/L (ref 98–107)
CO2 SERPL-SCNC: 21 MMOL/L (ref 22–29)
CREAT SERPL-MCNC: 0.93 MG/DL (ref 0.76–1.27)
DEPRECATED RDW RBC AUTO: 45.2 FL (ref 37–54)
EGFRCR SERPLBLD CKD-EPI 2021: 90 ML/MIN/1.73
ERYTHROCYTE [DISTWIDTH] IN BLOOD BY AUTOMATED COUNT: 13.6 % (ref 12.3–15.4)
GLOBULIN UR ELPH-MCNC: 2.8 GM/DL
GLUCOSE BLDC GLUCOMTR-MCNC: 188 MG/DL (ref 70–130)
GLUCOSE BLDC GLUCOMTR-MCNC: 202 MG/DL (ref 70–130)
GLUCOSE BLDC GLUCOMTR-MCNC: 217 MG/DL (ref 70–130)
GLUCOSE BLDC GLUCOMTR-MCNC: 278 MG/DL (ref 70–130)
GLUCOSE SERPL-MCNC: 140 MG/DL (ref 65–99)
HCT VFR BLD AUTO: 40.6 % (ref 37.5–51)
HGB BLD-MCNC: 13.7 G/DL (ref 13–17.7)
MCH RBC QN AUTO: 30.5 PG (ref 26.6–33)
MCHC RBC AUTO-ENTMCNC: 33.7 G/DL (ref 31.5–35.7)
MCV RBC AUTO: 90.4 FL (ref 79–97)
PLATELET # BLD AUTO: 73 10*3/MM3 (ref 140–450)
PMV BLD AUTO: 11.8 FL (ref 6–12)
POTASSIUM SERPL-SCNC: 4 MMOL/L (ref 3.5–5.2)
PROT SERPL-MCNC: 6.2 G/DL (ref 6–8.5)
QT INTERVAL: 354 MS
QTC INTERVAL: 430 MS
RBC # BLD AUTO: 4.49 10*6/MM3 (ref 4.14–5.8)
SODIUM SERPL-SCNC: 136 MMOL/L (ref 136–145)
WBC NRBC COR # BLD AUTO: 3.81 10*3/MM3 (ref 3.4–10.8)

## 2025-04-28 PROCEDURE — 99252 IP/OBS CONSLTJ NEW/EST SF 35: CPT | Performed by: NURSE PRACTITIONER

## 2025-04-28 PROCEDURE — 63710000001 INSULIN LISPRO (HUMAN) PER 5 UNITS: Performed by: FAMILY MEDICINE

## 2025-04-28 PROCEDURE — 86618 LYME DISEASE ANTIBODY: CPT | Performed by: CLINICAL NURSE SPECIALIST

## 2025-04-28 PROCEDURE — A9579 GAD-BASE MR CONTRAST NOS,1ML: HCPCS | Performed by: STUDENT IN AN ORGANIZED HEALTH CARE EDUCATION/TRAINING PROGRAM

## 2025-04-28 PROCEDURE — 99233 SBSQ HOSP IP/OBS HIGH 50: CPT | Performed by: PSYCHIATRY & NEUROLOGY

## 2025-04-28 PROCEDURE — 25510000001 GADOPICLENOL 0.5 MMOL/ML SOLUTION: Performed by: STUDENT IN AN ORGANIZED HEALTH CARE EDUCATION/TRAINING PROGRAM

## 2025-04-28 PROCEDURE — 80053 COMPREHEN METABOLIC PANEL: CPT | Performed by: FAMILY MEDICINE

## 2025-04-28 PROCEDURE — 63710000001 INSULIN GLARGINE PER 5 UNITS: Performed by: FAMILY MEDICINE

## 2025-04-28 PROCEDURE — 87798 DETECT AGENT NOS DNA AMP: CPT | Performed by: CLINICAL NURSE SPECIALIST

## 2025-04-28 PROCEDURE — 86008 ALLG SPEC IGE RECOMB EA: CPT | Performed by: CLINICAL NURSE SPECIALIST

## 2025-04-28 PROCEDURE — 70553 MRI BRAIN STEM W/O & W/DYE: CPT

## 2025-04-28 PROCEDURE — 87484 EHRLICHA CHAFFEENSIS AMP PRB: CPT | Performed by: CLINICAL NURSE SPECIALIST

## 2025-04-28 PROCEDURE — 86003 ALLG SPEC IGE CRUDE XTRC EA: CPT | Performed by: CLINICAL NURSE SPECIALIST

## 2025-04-28 PROCEDURE — 87468 ANAPLSMA PHGCYTOPHLM AMP PRB: CPT | Performed by: CLINICAL NURSE SPECIALIST

## 2025-04-28 PROCEDURE — 97161 PT EVAL LOW COMPLEX 20 MIN: CPT | Performed by: PHYSICAL THERAPIST

## 2025-04-28 PROCEDURE — 97535 SELF CARE MNGMENT TRAINING: CPT

## 2025-04-28 PROCEDURE — 85027 COMPLETE CBC AUTOMATED: CPT | Performed by: FAMILY MEDICINE

## 2025-04-28 PROCEDURE — 82948 REAGENT STRIP/BLOOD GLUCOSE: CPT

## 2025-04-28 PROCEDURE — 82785 ASSAY OF IGE: CPT | Performed by: CLINICAL NURSE SPECIALIST

## 2025-04-28 PROCEDURE — 25010000002 CEFTRIAXONE PER 250 MG: Performed by: FAMILY MEDICINE

## 2025-04-28 RX ORDER — INSULIN GLARGINE 100 [IU]/ML
30 INJECTION, SOLUTION SUBCUTANEOUS DAILY
COMMUNITY

## 2025-04-28 RX ORDER — GABAPENTIN 300 MG/1
600 CAPSULE ORAL 2 TIMES DAILY
COMMUNITY

## 2025-04-28 RX ORDER — TRAMADOL HYDROCHLORIDE 50 MG/1
50 TABLET ORAL 4 TIMES DAILY PRN
COMMUNITY

## 2025-04-28 RX ADMIN — METOPROLOL TARTRATE 100 MG: 100 TABLET, FILM COATED ORAL at 09:22

## 2025-04-28 RX ADMIN — INSULIN LISPRO 4 UNITS: 100 INJECTION, SOLUTION INTRAVENOUS; SUBCUTANEOUS at 09:21

## 2025-04-28 RX ADMIN — LAMOTRIGINE 200 MG: 100 TABLET ORAL at 09:22

## 2025-04-28 RX ADMIN — Medication 10 ML: at 12:42

## 2025-04-28 RX ADMIN — APIXABAN 5 MG: 5 TABLET, FILM COATED ORAL at 09:22

## 2025-04-28 RX ADMIN — PANTOPRAZOLE SODIUM 40 MG: 40 TABLET, DELAYED RELEASE ORAL at 20:28

## 2025-04-28 RX ADMIN — SODIUM CHLORIDE 1000 MG: 900 INJECTION INTRAVENOUS at 20:26

## 2025-04-28 RX ADMIN — INSULIN LISPRO 2 UNITS: 100 INJECTION, SOLUTION INTRAVENOUS; SUBCUTANEOUS at 17:59

## 2025-04-28 RX ADMIN — INSULIN GLARGINE 25 UNITS: 100 INJECTION, SOLUTION SUBCUTANEOUS at 09:20

## 2025-04-28 RX ADMIN — GABAPENTIN 300 MG: 300 CAPSULE ORAL at 20:28

## 2025-04-28 RX ADMIN — LAMOTRIGINE 200 MG: 100 TABLET ORAL at 20:28

## 2025-04-28 RX ADMIN — ISOSORBIDE MONONITRATE 120 MG: 30 TABLET, EXTENDED RELEASE ORAL at 09:22

## 2025-04-28 RX ADMIN — Medication 10 ML: at 20:30

## 2025-04-28 RX ADMIN — INSULIN LISPRO 6 UNITS: 100 INJECTION, SOLUTION INTRAVENOUS; SUBCUTANEOUS at 12:41

## 2025-04-28 RX ADMIN — SACUBITRIL AND VALSARTAN 1 TABLET: 97; 103 TABLET, FILM COATED ORAL at 20:29

## 2025-04-28 RX ADMIN — EMPAGLIFLOZIN 25 MG: 10 TABLET, FILM COATED ORAL at 09:22

## 2025-04-28 RX ADMIN — LEVETIRACETAM 1500 MG: 500 TABLET, FILM COATED ORAL at 09:22

## 2025-04-28 RX ADMIN — APIXABAN 5 MG: 5 TABLET, FILM COATED ORAL at 20:28

## 2025-04-28 RX ADMIN — PANTOPRAZOLE SODIUM 40 MG: 40 TABLET, DELAYED RELEASE ORAL at 09:21

## 2025-04-28 RX ADMIN — LEVETIRACETAM 2000 MG: 500 TABLET, FILM COATED ORAL at 20:27

## 2025-04-28 RX ADMIN — SACUBITRIL AND VALSARTAN 1 TABLET: 97; 103 TABLET, FILM COATED ORAL at 09:21

## 2025-04-28 RX ADMIN — GADOPICLENOL 10 ML: 485.1 INJECTION INTRAVENOUS at 08:07

## 2025-04-28 RX ADMIN — ROSUVASTATIN CALCIUM 20 MG: 20 TABLET, FILM COATED ORAL at 20:28

## 2025-04-28 RX ADMIN — ASPIRIN 81 MG: 81 TABLET, COATED ORAL at 09:22

## 2025-04-28 RX ADMIN — INSULIN GLARGINE 25 UNITS: 100 INJECTION, SOLUTION SUBCUTANEOUS at 20:27

## 2025-04-28 RX ADMIN — TAMSULOSIN HYDROCHLORIDE 0.4 MG: 0.4 CAPSULE ORAL at 09:22

## 2025-04-28 RX ADMIN — INSULIN LISPRO 4 UNITS: 100 INJECTION, SOLUTION INTRAVENOUS; SUBCUTANEOUS at 20:27

## 2025-04-28 RX ADMIN — FLUTICASONE PROPIONATE 2 SPRAY: 50 SPRAY, METERED NASAL at 09:23

## 2025-04-28 NOTE — PROGRESS NOTES
Neurology Progress Note      Chief Complaint:  seizure  Length of Stay:  2   Subjective     Subjective:    No seizures overnight.  Still complains of toe pain on the right foot but says it is looking better.  Antibiotics are currently going and has been afebrile since 4/26.  Tick panels remain pending    Medications:  Current Facility-Administered Medications   Medication Dose Route Frequency Provider Last Rate Last Admin    acetaminophen (TYLENOL) tablet 325 mg  325 mg Oral Q6H PRN Vish Latham MD   325 mg at 04/26/25 2038    albuterol (PROVENTIL) nebulizer solution 0.083% 2.5 mg/3mL  2.5 mg Nebulization Q6H PRN Vish Latham MD        apixaban (ELIQUIS) tablet 5 mg  5 mg Oral BID Vish Latham MD   5 mg at 04/28/25 0922    aspirin EC tablet 81 mg  81 mg Oral Daily Vish Latham MD   81 mg at 04/28/25 0922    sennosides-docusate (PERICOLACE) 8.6-50 MG per tablet 2 tablet  2 tablet Oral BID PRN Vish Latham MD        And    polyethylene glycol (MIRALAX) packet 17 g  17 g Oral Daily PRN Vish Latham MD        And    bisacodyl (DULCOLAX) EC tablet 5 mg  5 mg Oral Daily PRN Vish Latham MD        And    bisacodyl (DULCOLAX) suppository 10 mg  10 mg Rectal Daily PRN Vish Latham MD        cefTRIAXone (ROCEPHIN) 1,000 mg in sodium chloride 0.9 % 100 mL MBP  1,000 mg Intravenous Q24H Vish Latham  mL/hr at 04/27/25 2028 1,000 mg at 04/27/25 2028    dextrose (D50W) (25 g/50 mL) IV injection 25 g  25 g Intravenous Q15 Min PRN Vish Latham MD        dextrose (GLUTOSE) oral gel 15 g  15 g Oral Q15 Min PRN Vish Latham MD        docusate sodium (COLACE) capsule 100 mg  100 mg Oral Daily PRN Vish Latham MD        empagliflozin (JARDIANCE) tablet 25 mg  25 mg Oral Daily Vish Latham MD   25 mg at 04/28/25 0922    fluticasone (FLONASE) 50 MCG/ACT nasal spray 2 spray  2 spray Nasal Daily Vish Latham MD   2 spray at 04/28/25 0923    furosemide (LASIX) tablet 40 mg  40 mg Oral Daily  PRN Vish Latham MD        gabapentin (NEURONTIN) capsule 300 mg  300 mg Oral Nightly Vish Latham MD   300 mg at 04/27/25 2028    glucagon (GLUCAGEN) injection 1 mg  1 mg Intramuscular Q15 Min PRN Vish Latham MD        guaiFENesin (MUCINEX) 12 hr tablet 1,200 mg  1,200 mg Oral Daily PRN Vish Latham MD        insulin glargine (LANTUS, SEMGLEE) injection 25 Units  25 Units Subcutaneous Q12H Vish Latham MD   25 Units at 04/28/25 0920    Insulin Lispro (humaLOG) injection 2-9 Units  2-9 Units Subcutaneous 4x Daily AC & at Bedtime Vish Latham MD   4 Units at 04/28/25 0921    isosorbide mononitrate (IMDUR) 24 hr tablet 120 mg  120 mg Oral Daily Vish Latham MD   120 mg at 04/28/25 0922    lamoTRIgine (LaMICtal) tablet 200 mg  200 mg Oral BID Vish Latham MD   200 mg at 04/28/25 0922    levETIRAcetam (KEPPRA) tablet 1,500 mg  1,500 mg Oral Daily Vish Latham MD   1,500 mg at 04/28/25 0922    levETIRAcetam (KEPPRA) tablet 2,000 mg  2,000 mg Oral Nightly Vish Latham MD   2,000 mg at 04/27/25 2027    LORazepam (ATIVAN) injection 1 mg  1 mg Intravenous Q2H PRN Vish Latham MD        metoprolol tartrate (LOPRESSOR) tablet 100 mg  100 mg Oral BID Vish Latham MD   100 mg at 04/28/25 0922    nitroglycerin (NITROSTAT) SL tablet 0.4 mg  0.4 mg Sublingual Q5 Min PRN Vish Latham MD        ondansetron (ZOFRAN) injection 4 mg  4 mg Intravenous Q6H PRN Vish Latham MD        pantoprazole (PROTONIX) EC tablet 40 mg  40 mg Oral BID Vish Latham MD   40 mg at 04/28/25 0921    polyethylene glycol (MIRALAX) packet 17 g  17 g Oral Daily Vish Latham MD   17 g at 04/26/25 2038    rosuvastatin (CRESTOR) tablet 20 mg  20 mg Oral Nightly Vish Latham MD   20 mg at 04/27/25 2028    sacubitril-valsartan (ENTRESTO)  MG tablet 1 tablet  1 tablet Oral BID Vish Latham MD   1 tablet at 04/28/25 0921    sodium chloride 0.9 % flush 10 mL  10 mL Intravenous Q12H Vish Latham MD   10 mL  at 04/27/25 2029    sodium chloride 0.9 % flush 10 mL  10 mL Intravenous PRN Vish Latham MD        sodium chloride 0.9 % infusion 40 mL  40 mL Intravenous PRN Vish Latham MD        tamsulosin (FLOMAX) 24 hr capsule 0.4 mg  0.4 mg Oral Daily Vish Latham MD   0.4 mg at 04/28/25 0922             Objective      Vital Signs  Temp:  [97.6 °F (36.4 °C)-98.9 °F (37.2 °C)] 97.7 °F (36.5 °C)  Heart Rate:  [54-63] 54  Resp:  [18] 18  BP: ()/(45-96) 114/66    Physical Exam:    HEENT:  Neck is supple  CVS:  RRR  Lungs:  CTA - B/L  Abd:  NT/ND  Ext:  Right foot somewhat red and 3rd toe is discolored.  Skin:  No rashes    Pertinent Neuro Exam:  Neuro exam non-focal  No weakness  No tremors     Results Review:      Labs:  CBC:  Plt 73 (chronically low)  Hemoglobin A1c is 7.6% up from 7  Lipid panel shows an LDL of 46  TSH normal at 0.6  Blood cultures show no growth at 24 hours  Ehrlichia panel pending  Rickettsia panel pending  Alpha gal pending  Lyme disease pending  Keppra level pending  Lamictal level pending    Imaging:  MR Brain with and without on 4/27:  Normal  EEG 4/27:  SUMMARY:     Excessive drowsiness     Otherwise unremarkable EEG in the mostly asleep and briefly awake states     No focal features or epileptiform activity are seen     IMPRESSION:     Normal study    Assessment/Plan     Hospital Problem List      Recurrent seizures    Type 2 diabetes mellitus with circulatory disorder, with long-term current use of insulin    Gastroesophageal reflux disease without esophagitis    Benign non-nodular prostatic hyperplasia with lower urinary tract symptoms    Deviated nasal septum    Maribell bullosa    Diabetic complication    Diabetic peripheral neuropathy    Class 1 obesity due to excess calories with serious comorbidity and body mass index (BMI) of 34.0 to 34.9 in adult    Atrial flutter    Chronic diastolic congestive heart failure with preserved ejection fraction    S/P CABG x 2    Presence of  CardioMEMS HF system    Chronic heart failure with preserved ejection fraction (HFpEF)    Impression:  Epilepsy well-controlled for the last 3 years on the same doses of antiepileptics.  I am inclined to believe this is a provoked seizure from an underlying infection.  He has a component of cellulitis on the right toe and foot which seems to be improving with antibiotics.  Cultures remain pending.  He has remained on the same antiepileptics as his home meds and has had no further seizure activity.  Tick bite panel remains pending as well.  Keppra and Lamictal levels are pending.  I think is reasonable to continue his current Lamictal and Keppra with no changes.    Plan:  Follow-up Keppra and Lamictal levels  Follow-up tick panels as above  Continue Keppra 1500 mg in the morning and 2000 mg at night (home dose) and Lamictal 200 mg twice daily and Neurontin 3 mg nightly  Seizure precautions      Medical Decision Making    Number/Complexity of Problems  Moderate  1 undiagnosed new problem with uncertain prognosis -   1 acute illness with systemic symptoms -   High  1 acute or chronic illness that poses a threat to life/body function -   High    MDM Data  Moderate - 1/3 categories  Extensive - 2/3 categories    Category 1: 3 of the following  Review of external notes  Review of results  Ordering of each unique test  Independent historian  Category 2:  Independent interpretation of test (ex: imaging)  Category 3:  Discussion of management with another provider    Extensive     Treatment Plan  Moderate - Prescription Drug management  High  Drug therapy requiring intensive monitoring for toxicity  Decision regarding hospitalization or escalation of care  De-escalate care/DNR decisions  High - discussed with medical team       Valentino Kruger MD  04/28/25  11:37 CDT

## 2025-04-28 NOTE — PLAN OF CARE
Goal Outcome Evaluation:  Plan of Care Reviewed With: patient        Progress: improving  Outcome Evaluation: Pt has met all OT goals and is at baseline. OT to sign off.    Anticipated Discharge Disposition (OT): home with assist

## 2025-04-28 NOTE — PLAN OF CARE
Goal Outcome Evaluation:           Progress: improving          Patient alert and responsive able to make needs known denies pain today up in room standby assist. Scheduled for great toe amp on Wednesday had MRI today. No seizures noted today. No acute distress noted.

## 2025-04-28 NOTE — THERAPY DISCHARGE NOTE
Acute Care - Occupational Therapy Treatment Note/Discharge  [facility]     Patient Name: Carlos A Boyer Jr.  : 1960  MRN: [id number]  Today's Date: 2025               Admit Date: 2025       ICD-10-CM ICD-9-CM   1. Recurrent seizures  G40.909 345.80     Patient Active Problem List   Diagnosis    Type 2 diabetes mellitus with circulatory disorder, with long-term current use of insulin    Gastroesophageal reflux disease without esophagitis    Primary hypertension    Seizure disorder    Carotid disease, bilateral    Coronary artery disease involving native coronary artery of native heart without angina pectoris    Hyperlipidemia LDL goal <70    Chronic left arterial ischemic stroke, ICA (internal carotid artery)    HOA on CPAP    Benign non-nodular prostatic hyperplasia with lower urinary tract symptoms    Deviated nasal septum    Maribell bullosa    Hypertrophy of both inferior nasal turbinates    Chronic sinusitis of both maxillary sinuses    S/P FESS (functional endoscopic sinus surgery)    Diabetic complication    Epigastric pain    Diabetic peripheral neuropathy    Class 1 obesity due to excess calories with serious comorbidity and body mass index (BMI) of 34.0 to 34.9 in adult    Aspirin-like platelet function defect    History of seizure    Lung nodules    Cardiac murmur    Thoracic aortic aneurysm without rupture    Paroxysmal atrial fibrillation    Atrial flutter    Chronic diastolic congestive heart failure with preserved ejection fraction    Fluid overload    Bilateral pleural effusion    Respiratory distress    Long-term use of high-risk medication    Dysphagia    Left lower quadrant abdominal pain    COVID-19 virus detected    Shortness of breath    S/P CABG x 2    Chronic anticoagulation    Old complex tear of lateral meniscus of right knee    BPH with obstruction/lower urinary tract symptoms    Daytime somnolence    Snoring    Aneurysm of ascending aorta without rupture    Presence of  CardioMEMS HF system    Chest pain    Non-pressure chronic ulcer of other part of right foot with fat layer exposed    Diabetic ulcer of toe of right foot associated with type 2 diabetes mellitus, with fat layer exposed    Deformity, toe acquired, right    Cellulitis of right toe    Chronic heart failure with preserved ejection fraction (HFpEF)    Recurrent seizures     Past Medical History:   Diagnosis Date    Arthritis     Asthma     Cancer     skin    Carotid disease, bilateral     Cellulitis     right foot - on antibiotics 6-    Chest pain     Chronic diastolic congestive heart failure 09/07/2019    Chronic sinusitis     Maribell bullosa     Coronary artery disease involving native coronary artery of native heart with unstable angina pectoris 01/17/2017    Deviated septum     Diabetes mellitus     Difficulty urinating     Diverticulitis     Enlarged prostate     Fatty liver     GERD (gastroesophageal reflux disease)     [place] (hard of hearing)     Does have hearing aids    Hyperlipidemia LDL goal <70 02/02/2017    Hypertrophy of nasal turbinates     Keratoderma     Kidney stone     Lung nodules     lower right lung    Migraine     Murmur, heart     Myocardial infarction     Obesity     Paroxysmal atrial fibrillation 07/11/2019    Personal history of COVID-19 07/2021    PONV (postoperative nausea and vomiting)     Primary hypertension 10/16/2016    Psoriasis     Seizures     Sinus congestion     Skin cancer     Sleep apnea     not using cpap    SOB (shortness of breath)     Stroke     mild weakness on right side    UTI (urinary tract infection)      Past Surgical History:   Procedure Laterality Date    CARDIAC CATHETERIZATION  01/2016    Dr. Broadbent; widely patent previously placed stents in the left anterior descending and obstructive disease involving the diagonal branch which was treated medically    CARDIAC CATHETERIZATION N/A 07/14/2017    Procedure: Left Heart Cath;  Surgeon: Wade Ramey MD;   Location:  [facility]ATH [facility];  Service:     CARDIAC CATHETERIZATION Left 10/15/2018    Procedure: Cardiac Catheterization/Vascular Study;  Surgeon: Wade Ramey MD;  Location:  [facility];  Service: Cardiology    CARDIAC CATHETERIZATION  10/15/2018    Procedure: Functional Flow [place];  Surgeon: Wade Ramey MD;  Location:  [facility];  Service: Cardiology    CARDIAC CATHETERIZATION N/A 10/15/2018    Procedure: Left ventriculography;  Surgeon: Wade Ramey MD;  Location:  [facility];  Service: Cardiology    CARDIAC CATHETERIZATION Left 06/26/2019    Procedure: Cardiac Catheterization/Vascular Study VEL OK  HE WILL WAIT 1 YEAR FOR SHOULDER SURGERY ;  Surgeon: Wade Ramey MD;  Location:  [facility];  Service: Cardiology    CARDIAC CATHETERIZATION Left 04/30/2021    Procedure: Coronary angiography;  Surgeon: Sahil Llamas MD;  Location:  [facility];  Service: Cardiology;  Laterality: Left;    CARDIAC CATHETERIZATION N/A 04/30/2021    Procedure: Percutaneous Coronary Intervention;  Surgeon: Sahil Llamas MD;  Location: [facility] CATH INVASIVE LOCATION;  Service: Cardiology;  Laterality: N/A;    CARDIAC CATHETERIZATION N/A 11/09/2022    Procedure: Left Heart Cath with SVGs;  Surgeon: Wade Ramey MD;  Location: [facility] CATH INVASIVE LOCATION;  Service: Cardiology;  Laterality: N/A;    CARPAL TUNNEL RELEASE      January 2024 and pther hand February 2024    CHOLECYSTECTOMY      CHOLECYSTECTOMY WITH INTRAOPERATIVE CHOLANGIOGRAM N/A 08/01/2018    Procedure: CHOLECYSTECTOMY LAPAROSCOPIC INTRAOPERATIVE CHOLANGIOGRAM;  Surgeon: Shane Ann MD;  Location: [facility] OR;  Service: General    COLONOSCOPY N/A 07/14/2020    Procedure: COLONOSCOPY WITH ANESTHESIA;  Surgeon: Anupam Morales DO;  Location: [facility] ENDOSCOPY;  Service: Gastroenterology;  Laterality: N/A;  pre: abdominal pain  post: diverticulosis  Bren  GINA Perry    CORONARY ANGIOPLASTY      CORONARY ARTERY BYPASS GRAFT N/A 07/06/2019    Procedure: CABG X2 WITH LIMA, LEFT LEG OVH, AND PLACEMENT OF LEFT FEMORAL ARTERIAL LINE;  Surgeon: Steven Tang MD;  Location:  [facility] OR;  Service: Cardiothoracic    CORONARY STENT PLACEMENT      x 6    CYSTOSCOPY TRANSURETHRAL RESECTION OF PROSTATE N/A 01/23/2023    Procedure: CYSTOSCOPY TRANSURETHRAL RESECTION OF PROSTATE;  Surgeon: Latrell Pope MD;  Location:  [facility] OR;  Service: Urology;  Laterality: N/A;    ENDOSCOPIC FUNCTIONAL SINUS SURGERY (FESS) Bilateral 12/13/2017    Procedure: PROCEDURE PERFORMED:  Bilateral functional endoscopic anterior ethmoidectomy with bilateral middle meatal antrostomy Septoplasty Right kathia bullosa resection Bilateral inferior turbinate reduction via Coblation;  Surgeon: Mayank Ibarra MD;  Location:  [facility] OR;  Service:     ENDOSCOPY N/A 07/30/2018    Procedure: ESOPHAGOGASTRODUODENOSCOPY WITH ANESTHESIA;  Surgeon: Benitez Mas MD;  Location:  [facility] ENDOSCOPY;  Service: Gastroenterology    ENDOSCOPY N/A 07/14/2020    Procedure: ESOPHAGOGASTRODUODENOSCOPY WITH ANESTHESIA;  Surgeon: Anupam Morales DO;  Location:  [facility] ENDOS[facility];  Service: Gastroenterology;  Laterality: N/A;  pre: abdominal pain  post: esophagitis  Vinayak Correia PA    HERNIA REPAIR      x2 inguinal area    KIDNEY STONE SURGERY      KNEE ARTHROSCOPY Right 03/01/2022    Procedure: RIGHT KNEE PARTIAL LATERAL MENISCECTOMY;  Surgeon: Pedro Pablo Song MD;  Location:  [facility] OR;  Service: Orthopedics;  Laterality: Right;    KNEE SURGERY Right     OTHER SURGICAL HISTORY      urolift    PROSTATE SURGERY      Dr. Badillo - 2017    PULMONARY ARTERY PRESSURE SENSOR IMPLANT N/A 05/08/2023    Procedure: PA Pressure Sensor Implant;  Surgeon: Wade Ramey MD;  Location:  [facility] C[facility];  Service: Cardiology;  Laterality: N/A;    ROTATOR CUFF REPAIR Right     SLEEP ENDOSCOPY N/A  02/27/2023    Procedure: Videosleep endoscopy;  Surgeon: Mayank Ibarra MD;  Location:  [facility] OR;  Service: ENT;  Laterality: N/A;    THUMB AMPUTATION Left     partial    TOE FUSION Right 7/3/2024    Procedure: Hallux Interphalangeal Joint Arthrodesis, Bone Graft Armstrong - Right Foot;  Surgeon: Hernan Villa DPM;  Location:  [facility] OR;  Service: Podiatry;  Laterality: Right;    TOE SURGERY      great toe       OT ASSESSMENT FLOWSHEET (Last 12 Hours)       OT Evaluation and Treatment       Row Name 04/28/25 1123                   OT Time and Intention    Subjective Information no complaints  -TS        Document Type therapy note (daily note)  -TS        Mode of Treatment occupational therapy  -TS           General Information    Existing Precautions/Restrictions seizures  -TS           Pain Assessment    Pretreatment Pain Rating 0/10 - no pain  -TS        Posttreatment Pain Rating 0/10 - no pain  -TS           Cognition    Orientation Status (Cognition) oriented x 4  -TS        Personal Safety Interventions fall prevention program maintained  -TS           Activities of Daily Living    BADL Assessment/Intervention lower body dressing  -TS           Lower Body Dressing Assessment/Training    [place] Level (Lower Body Dressing) don;pants/bottoms;set up  -TS        Position (Lower Body Dressing) unsupported sitting;unsupported standing  -TS           Bed Mobility    Bed Mobility supine-sit;sit-supine  -TS        Supine-Sit [place] (Bed Mobility) independent  -TS        Sit-Supine [place] (Bed Mobility) independent  -TS           Functional Mobility    Functional Mobility- Ind. Level independent  -TS           Transfer Assessment/Treatment    Transfers sit-stand transfer;stand-sit transfer;bathtub transfer  -TS           Sit-Stand Transfer    Sit-Stand [place] (Transfers) independent  -TS           Stand-Sit Transfer    Stand-Sit [place] (Transfers) independent  -TS           Bathtub  Transfer    Type (Bathtub Transfer) lateral  -TS        [place] Level (Bathtub Transfer) independent  -TS        Comment, (Bathtub Transfer) simulated in room  -TS           Wound Right anterior second toe Other (Comments)    Wound - Properties Group Side: Right  -JM Orientation: anterior  -JM Location: second toe  -JM Primary Wound Type: Other  -JM Additional Comments: Pink and swollen warm to touch  -JM    Retired Wound - Properties Group Side: Right  -JM Orientation: anterior  -JM Location: second toe  -JM Additional Comments: Pink and swollen warm to touch  -JM    Retired Wound - Properties Group Side: Right  -JM Orientation: anterior  -JM Location: second toe  -JM Additional Comments: Pink and swollen warm to touch  -JM    Retired Wound - Properties Group Side: Right  -JM Location: second toe  -JM Additional Comments: Pink and swollen warm to touch  -JM       Wound posterior coccyx Traumatic Skin Tear    Wound - Properties Group Orientation: posterior  -JM Location: coccyx  -JM Primary Wound Type: Traumatic  -JM Secondary Wound Type - Traumatic: Skin Tear  -JM    Retired Wound - Properties Group Orientation: posterior  -JM Location: coccyx  -JM    Retired Wound - Properties Group Orientation: posterior  -JM Location: coccyx  -JM    Retired Wound - Properties Group Location: coccyx  -JM       Wound Right posterior great toe Other (Comments)    Wound - Properties Group Side: Right  -JM Orientation: posterior  -JM Location: great toe  -JM Primary Wound Type: Other  -JM Secondary Wound Type - Other: --  -JM, callous with crack at bottom of right great toe     Retired Wound - Properties Group Side: Right  -JM Orientation: posterior  -JM Location: great toe  -JM    Retired Wound - Properties Group Side: Right  -JM Orientation: posterior  -JM Location: great toe  -JM    Retired Wound - Properties Group Side: Right  -JM Location: great toe  -JM       Plan of Care Review    Plan of Care Reviewed With patient  -TS         Progress improving  -TS        Outcome Evaluation Pt has met all OT goals and is at baseline. OT to sign off.  -TS           Positioning and Restraints    Pre-Treatment Position in bed  -TS        Post Treatment Position bed  -TS        In Bed sitting EOB;side rails up x2  -TS           Transfer Goal 1 (OT)    Activity/Assistive Device (Transfer Goal 1, OT) toilet;tub  -TS        [place] Level/Cues Needed (Transfer Goal 1, OT) modified independence  -TS        Time Frame (Transfer Goal 1, OT) long term goal (LTG);10 days  -TS        Progress/Outcome (Transfer Goal 1, OT) goal met  -TS           Bathing Goal 1 (OT)    Activity/Device (Bathing Goal 1, OT) bathing skills, all  -TS        [place] Level/Cues Needed (Bathing Goal 1, OT) modified independence  -TS        Time Frame (Bathing Goal 1, OT) long term goal (LTG);10 days  -TS        Progress/Outcomes (Bathing Goal 1, OT) goal met  -TS           Dressing Goal 1 (OT)    Activity/Device (Dressing Goal 1, OT) dressing skills, all  -TS        [place]/Cues Needed (Dressing Goal 1, OT) modified independence  -TS        Time Frame (Dressing Goal 1, OT) long term goal (LTG);10 days  -TS        Progress/Outcome (Dressing Goal 1, OT) goal met  -TS                  User Key  (r) = Recorded By, (t) = Taken By, (c) = Cosigned By      Initials Name Effective Dates    TS Celia Pandey COTA 02/03/23 -     Mira Perez RN 04/22/24 -                     Occupational Therapy Education       Title: PT OT SLP Therapies (Done)       Topic: Occupational Therapy (Done)       Point: ADL training (Done)       Learning Progress Summary            Patient Acceptance, E, VU,NR by  at 4/27/2025 1429                      Point: Precautions (Done)       Learning Progress Summary            Patient Acceptance, E, VU,NR by  at 4/27/2025 1429                                      User Key       Initials Effective Dates Name Provider Type Discipline     06/20/22  -  Charissa Goyal, OTR/L Occupational Therapist OT                    OT Recommendation and Plan     Plan of Care Review  Plan of Care Reviewed With: patient  Progress: improving  Outcome Evaluation: Pt has met all OT goals and is at baseline. OT to sign off.  Plan of Care Reviewed With: patient  Outcome Evaluation: Pt has met all OT goals and is at baseline. OT to sign off.      OT Rehab Goals       Row Name 04/28/25 1123             Transfer Goal 1 (OT)    Activity/Assistive Device (Transfer Goal 1, OT) toilet;tub  -TS      [place] Level/Cues Needed (Transfer Goal 1, OT) modified independence  -TS      Time Frame (Transfer Goal 1, OT) long term goal (LTG);10 days  -TS      Progress/Outcome (Transfer Goal 1, OT) goal met  -TS         Bathing Goal 1 (OT)    Activity/Device (Bathing Goal 1, OT) bathing skills, all  -TS      [place] Level/Cues Needed (Bathing Goal 1, OT) modified independence  -TS      Time Frame (Bathing Goal 1, OT) long term goal (LTG);10 days  -TS      Progress/Outcomes (Bathing Goal 1, OT) goal met  -TS         Dressing Goal 1 (OT)    Activity/Device (Dressing Goal 1, OT) dressing skills, all  -TS      [place]/Cues Needed (Dressing Goal 1, OT) modified independence  -TS      Time Frame (Dressing Goal 1, OT) long term goal (LTG);10 days  -TS      Progress/Outcome (Dressing Goal 1, OT) goal met  -TS                User Key  (r) = Recorded By, (t) = Taken By, (c) = Cosigned By      Initials Name Provider Type Discipline    TS Celia Pandey COTA Occupational Therapist Assistant OT                     Outcome Measures       Row Name 04/28/25 1200             How much help from another is currently needed...    Putting on and taking off regular lower body clothing? 4  -TS      Bathing (including washing, rinsing, and drying) 4  -TS      Toileting (which includes using toilet bed pan or urinal) 4  -TS      Putting on and taking off regular upper body clothing 4  -TS      Taking care  of personal grooming (such as brushing teeth) 4  -TS      Eating meals 4  -TS      AM-PAC 6 Clicks Score (OT) 24  -TS                User Key  (r) = Recorded By, (t) = Taken By, (c) = Cosigned By      Initials Name Provider Type    Celia Santillan COTA Occupational Therapist Assistant                    Time Calculation:    Time Calculation- OT       Row Name 04/28/25 1233             Time Calculation- OT    OT Start Time 1130  -TS      OT Stop Time 1153  -TS      OT Time Calculation (min) 23 min  -TS      Total Timed Code Minutes- OT 23 minute(s)  -TS      OT Received On 04/28/25  -TS         Timed Charges    51112 - OT Self Care/Mgmt Minutes 23  -TS         Total Minutes    Timed Charges Total Minutes 23  -TS       Total Minutes 23  -TS                User Key  (r) = Recorded By, (t) = Taken By, (c) = Cosigned By      Initials Name Provider Type    Celia Santillan COTA Occupational Therapist Assistant                  Timed Therapy Charges  Total Units: 2      Suggested Charges  Total Units: 2      Procedure Name Documented Minutes Units Code    HC OT SELF CARE/MGMT/TRAIN EA 15 MIN 23 2   38852 (CPT®)                 Documented Minutes  Total Minutes: 23      Therapy Provided Minutes    31198 - OT Self Care/Mgmt Minutes 23                  Therapy Charges for Today       Code Description Service Date Service Provider Modifiers Qty    [id number] HC OT SELF CARE/MGMT/TRAIN EA 15 MIN 4/28/2025 Celia Pandey COTA GO 2                 OT Discharge Summary  Anticipated Discharge Disposition (OT): home with assist  Reason for Discharge: At baseline function  Outcomes Achieved: Refer to plan of care for updates on goals achieved  Discharge Destination: Home with assist    DAVID Price  4/28/2025

## 2025-04-28 NOTE — NURSING NOTE
[facility]  INPATIENT WOUND & OSTOMY CARE    Today's Date: 04/28/25    Patient Name: Carlos A Boyer Jr.  MRN: [id number]  CSN: [id number]  PCP: Vinayak Correia PA  Attending Provider: Angela Cesar MD  Length of Stay: 2    Wound care consulted for multiple wounds. Nursing has uploaded picture to chart. Patient was admitted with recurrent seizures on 4/26/2025. Patient is currently sitting up in chair.     Patient's 2nd toe is necrotic and malodorous. He states it started bothering him about a week ago and has gotten worse. Podiatry consulted and saw patient today with plans for partial 2nd toe amputation if cleared by Neurology.     Wound: fissure to coccyx  Base: red, slough and subcutaneous tissue  Periwound: dry and intact  Edges: open and attached  Drainage: small and s    Wound: diabetic wound to right 2nd toe   Base: necrotic tissue, unable to visualize wound bed  Periwound: macerated  Edges: irregular  Drainage: small purulent    Wound: diabetic wound to right 1st met head, right plantar great toe  Base: eschar  Periwound: dry and intact  Edges: dry and attached  Drainage: none     Wound RN Orders (last 12 hours) (12h ago, onward)       Start     Ordered    04/28/25 1239  Wound Care  Every 12 Hours        Question Answer Comment   Wound Locations right distal 2nd toe, right 1st met head, right plantar great toe    Wound Care Instructions cleanse with normal saline and apply a betadine soaked gauze    Cleanse Normal Saline    Moistened? Yes    Moisten With Betadine        04/28/25 1238    04/28/25 1239  Wound Care  Daily      Question Answer Comment   Wound Locations fissure on coccyx    Wound Care Instructions Clean wound with NS. Apply Opticell AG to wound bed. Cover with silicone border foam dressing. Soak dressing with normal saline prior to removal if dressing appears to be adhered to wound bed.    Cleanse Normal Saline    Intervention Other    Other opticell ag    Dressing:  Silicone Border Dressing        04/28/25 1238    04/28/25 1239  Use Repositioning Wedge to Position Patient  Continuous         04/28/25 1238    04/28/25 1238  Turn Patient  Now Then Every 2 Hours         04/28/25 1238    04/28/25 1238  Elevate Heels Off of Bed  Until Discontinued         04/28/25 1238    04/28/25 1238  Use Seat Cushion When Up In Chair  Continuous         04/28/25 1238    04/28/25 1238  Silicone Border Dressing to Bony Prominences  Per Order Details        Comments: Apply silicone border foam dressing per protocol to bilateral heels for protection. Nursing to change dressing every 3 days and PRN if soiled. Nursing is to peel back dressing with every assessment to assess skin underneath dressing. No barrier cream under dressing.    04/28/25 1238    Unscheduled  Wound Care  As Needed      Question:  Wound Care Instructions  Answer:  Apply Moisture Barrier After Any Incontinence    04/28/25 1238          Inpatient wound care will continue to follow during hospital stay.  Please contact if any issues or concerns arise.     This document has been electronically signed by Britney Salazar RN on 4/28/2025 13:49 CDT

## 2025-04-28 NOTE — PAYOR COMM NOTE
"Mabelmacario Carlos A SOLOMON Crisostomo (65 y.o. Male)   [id number]    Admit 4/26    [facility] phone    fax            Date of Birth   1960    Social Security Number       Address   [place] [address]    Home Phone   [phone]    MRN   [id number]       [employer]alian   [facility]    Marital Status                               Admission Date   4/26/2025    Admission Type   Emergency    Admitting Provider   Angela Cesar MD    Attending Provider   Angela Cesar MD    Department, Room/Bed   [facility] 3A, 341/1       Discharge Date       Discharge Disposition       Discharge Destination                                 Attending Provider: Angela Cesar MD    Allergies: Flagyl [Metronidazole], Atorvastatin, Ciprofloxacin    Isolation: None   Infection: COVID (History) (04/29/21)   Code Status: CPR    Ht: 182.9 cm (72\")   Wt: 110 kg (242 lb 14.4 oz)    Admission Cmt: None   Principal Problem: Recurrent seizures [G40.909]                   Active Insurance as of 4/26/2025       Primary Coverage       Payor Plan Insurance Group Employer/Plan Group    [facility] DEPT [id number]        Payor Plan Address Payor Plan Phone Number Payor Plan Fax Number Effective Dates    [facility] OFFICE OF COMMUNITY CARE [phone]  6/1/2020 - None Entered    [address] [address]       [facility] [address]         Subscriber Name Subscriber Birth Date Member ID       CARLOS A GOLDSTEIN SOLOMON CRISOSTOMO 1960 [id number]               Secondary Coverage       Payor Plan Insurance Group Employer/Plan Group    [facility] [employer] OPTUM        Payor Plan Address Payor Plan Phone Number Payor Plan Fax Number Effective Dates    [address] [phone]  6/1/2020 - None Entered    [facility] [id number]         Subscriber Name Subscriber Birth Date Member ID       CARLOS A GOLDSTEIN SOLOMON CRISOSTOMO 1960 [id number]                     Emergency Contacts       Contact " Person (Rel.) Home Phone Work Phone Mobile Phone    Daniela Boyer (Spouse) [phone]                 History & Physical        Vish Latham MD at 04/26/25 1721              [facility]  HISTORY AND PHYSICAL    Date of Admission: 4/26/2025  Primary Care Physician: Vinayak Correia PA    Subjective   Primary Historian: Patient.    Chief Complaint: Breakthrough seizure.    History of Present Illness  Patient is a 65-year-old presented ER complaining of seizure activity.  Patient stated he had 5 to seizure episode at home since yesterday.  Patient was here previously about 2 days ago for similar symptoms and was complaining of tonic-clonic seizure and was discharged home once everything came back negative.  Patient was evaluated in ER was given 1000 mg of Keppra, teleneurology was called and recommended admission with the hospitalist.    Patient has a past medical history arthritis, asthma, skin cancer, carotid disease bilateral cyst, cellulitis of the right foot, chest pain, diastolic heart failure,'s sinusitis, kathia bullosa, CAD, deviated septum, diabetes, difficult urination, diverticulitis, enlarged prostate, fatty liver, reflux, hard of hearing, hyperlipidemia, hypertrophy of nasal turbinates, keratoderma, kidney stone, lung nodule right lower lobe, migraine, heart murmur, cardiac infarct, obesity, atrial fibs, COVID, postop nausea vomiting, obesity, atrial fibrillation.    Review of Systems   Constitutional:  Positive for activity change, appetite change and fatigue. Negative for chills and fever.   HENT:  Negative for hearing loss, nosebleeds, tinnitus and trouble swallowing.    Eyes:  Negative for visual disturbance.   Respiratory:  Negative for cough, chest tightness, shortness of breath and wheezing.    Cardiovascular:  Negative for chest pain, palpitations and leg swelling.   Gastrointestinal:  Negative for abdominal  distention, abdominal pain, blood in stool, constipation, diarrhea, nausea and vomiting.   Endocrine: Negative for cold intolerance, heat intolerance, polydipsia, polyphagia and polyuria.   Genitourinary:  Negative for decreased urine volume, difficulty urinating, dysuria, flank pain, frequency and hematuria.   Musculoskeletal:  Positive for arthralgias, gait problem and myalgias. Negative for joint swelling.   Skin:  Negative for rash.   Allergic/Immunologic: Negative for immunocompromised state.   Neurological:  Positive for weakness. Negative for dizziness, syncope, light-headedness and headaches.   Hematological:  Negative for adenopathy. Does not bruise/bleed easily.   Psychiatric/Behavioral:  Negative for confusion and sleep disturbance. The patient is not nervous/anxious.         Otherwise complete ROS reviewed and negative except as mentioned in the HPI.    Past Medical History:   Past Medical History:   Diagnosis Date    Arthritis     Asthma     Cancer     skin    Carotid disease, bilateral     Cellulitis     right foot - on antibiotics 6-    Chest pain     Chronic diastolic congestive heart failure 09/07/2019    Chronic sinusitis     Maribell bullosa     Coronary artery disease involving native coronary artery of native heart with unstable angina pectoris 01/17/2017    Deviated septum     Diabetes mellitus     Difficulty urinating     Diverticulitis     Enlarged prostate     Fatty liver     GERD (gastroesophageal reflux disease)     [employer] [employer] (hard of hearing)     Does have hearing aids    Hyperlipidemia LDL goal <70 02/02/2017    Hypertrophy of nasal turbinates     Keratoderma     Kidney stone     Lung nodules     lower right lung    Migraine     Murmur, heart     Myocardial infarction     Obesity     Paroxysmal atrial fibrillation 07/11/2019    Personal history of COVID-19 07/2021    PONV (postoperative nausea and vomiting)     Primary hypertension 10/16/2016    Psoriasis     Seizures     Sinus congestion      Skin cancer     Sleep apnea     not using cpap    SOB (shortness of breath)     Stroke     mild weakness on right side    UTI (urinary tract infection)      Past Surgical History:  Past Surgical History:   Procedure Laterality Date    CARDIAC CATHETERIZATION  01/2016    Dr. Broadbent; widely patent previously placed stents in the left anterior descending and obstructive disease involving the diagonal branch which was treated medically    CARDIAC CATHETERIZATION N/A 07/14/2017    Procedure: Left Heart Cath;  Surgeon: Wade Ramey MD;  Location:  [facility];  Service:     CARDIAC CATHETERIZATION Left 10/15/2018    Procedure: Cardiac Catheterization/Vascular Study;  Surgeon: Wade Ramey MD;  Location:  [facility];  Service: Cardiology    CARDIAC CATHETERIZATION  10/15/2018    Procedure: Functional Flow Harmony;  Surgeon: Wade Ramey MD;  Location:  [facility];  Service: Cardiology    CARDIAC CATHETERIZATION N/A 10/15/2018    Procedure: Left ventriculography;  Surgeon: Wade Ramey MD;  Location:  [facility];  Service: Cardiology    CARDIAC CATHETERIZATION Left 06/26/2019    Procedure: Cardiac Catheterization/Vascular Study VEL OK  HE WILL WAIT 1 YEAR FOR SHOULDER SURGERY ;  Surgeon: Wade Ramey MD;  Location:  [facility];  Service: Cardiology    CARDIAC CATHETERIZATION Left 04/30/2021    Procedure: Coronary angiography;  Surgeon: Sahil Llamas MD;  Location:  [facility];  Service: Cardiology;  Laterality: Left;    CARDIAC CATHETERIZATION N/A 04/30/2021    Procedure: Percutaneous Coronary Intervention;  Surgeon: Sahil Llamas MD;  Location:  [facility];  Service: Cardiology;  Laterality: N/A;    CARDIAC CATHETERIZATION N/A 11/09/2022    Procedure: Left Heart Cath with SVGs;  Surgeon: Wade Ramey MD;  Location:  [facility];  Service: Cardiology;  Laterality:  N/A;    CARPAL TUNNEL RELEASE      January 2024 and pther hand February 2024    CHOLECYSTECTOMY      CHOLECYSTECTOMY WITH INTRAOPERATIVE CHOLANGIOGRAM N/A 08/01/2018    Procedure: CHOLECYSTECTOMY LAPAROSCOPIC INTRAOPERATIVE CHOLANGIOGRAM;  Surgeon: Shane Ann MD;  Location: [facility] OR;  Service: General    COLONOSCOPY N/A 07/14/2020    Procedure: COLONOSCOPY WITH ANESTHESIA;  Surgeon: Anupam Morales DO;  Location: [facility] ENDOSCOPY;  Service: Gastroenterology;  Laterality: N/A;  pre: abdominal pain  post: diverticulosis  Vinayak Correia PA    CORONARY ANGIOPLASTY      CORONARY ARTERY BYPASS GRAFT N/A 07/06/2019    Procedure: CABG X2 WITH LIMA, LEFT LEG OVH, AND PLACEMENT OF LEFT FEMORAL ARTERIAL LINE;  Surgeon: Steven Tang MD;  Location: [facility] OR;  Service: Cardiothoracic    CORONARY STENT PLACEMENT      x 6    CYSTOSCOPY TRANSURETHRAL RESECTION OF PROSTATE N/A 01/23/2023    Procedure: CYSTOSCOPY TRANSURETHRAL RESECTION OF PROSTATE;  Surgeon: Latrell Pope MD;  Location: [facility] OR;  Service: Urology;  Laterality: N/A;    ENDOSCOPIC FUNCTIONAL SINUS SURGERY (FESS) Bilateral 12/13/2017    Procedure: PROCEDURE PERFORMED:  Bilateral functional endoscopic anterior ethmoidectomy with bilateral middle meatal antrostomy Septoplasty Right kathia bullosa resection Bilateral inferior turbinate reduction via Coblation;  Surgeon: Mayank Ibarra MD;  Location: [facility] OR;  Service:     ENDOSCOPY N/A 07/30/2018    Procedure: ESOPHAGOGASTRODUODENOSCOPY WITH ANESTHESIA;  Surgeon: Benitez Mas MD;  Location: [facility] ENDOSCOPY;  Service: Gastroenterology    ENDOSCOPY N/A 07/14/2020    Procedure: ESOPHAGOGASTRODUODENOSCOPY WITH ANESTHESIA;  Surgeon: Anupam Morales DO;  Location: [facility] ENDOSCOPY;  Service: Gastroenterology;  Laterality: N/A;  pre: abdominal pain  post: esophagitis  Vinayak Correia PA    HERNIA REPAIR      x2 inguinal area    KIDNEY STONE SURGERY      KNEE ARTHROSCOPY Right  03/01/2022    Procedure: RIGHT KNEE PARTIAL LATERAL MENISCECTOMY;  Surgeon: Pedro Pablo Song MD;  Location:  [facility] OR;  Service: Orthopedics;  Laterality: Right;    KNEE SURGERY Right     OTHER SURGICAL HISTORY      urolift    PROSTATE SURGERY      Dr. Badillo - 2017    PULMONARY ARTERY PRESSURE SENSOR IMPLANT N/A 05/08/2023    Procedure: PA Pressure Sensor Implant;  Surgeon: Wade Ramey MD;  Location:  [facility];  Service: Cardiology;  Laterality: N/A;    ROTATOR CUFF REPAIR Right     SLEEP ENDOSCOPY N/A 02/27/2023    Procedure: Videosleep endoscopy;  Surgeon: Mayank Ibarra MD;  Location:  [facility] OR;  Service: ENT;  Laterality: N/A;    THUMB AMPUTATION Left     partial    TOE FUSION Right 7/3/2024    Procedure: Hallux Interphalangeal Joint Arthrodesis, Bone Graft [place] - Right Foot;  Surgeon: Hernan Villa DPM;  Location:  [facility] OR;  Service: Podiatry;  Laterality: Right;    TOE SURGERY      great toe     Social History:  reports that he quit smoking about 32 years ago. His smoking use included cigarettes and cigars. He started smoking about 50 years ago. He has a 27 pack-year smoking history. He has been exposed to tobacco smoke. He quit smokeless tobacco use about 16 years ago.  His smokeless tobacco use included chew. He reports that he does not drink alcohol and does not use drugs.    Family History: family history includes Asthma in his mother; COPD in his mother; Colon cancer in his maternal grandmother and paternal uncle; Colon polyps in his maternal grandmother; Heart disease in his father; Hypertension in his mother; No Known Problems in his sister and sister; Prostate cancer in his maternal grandfather.       Allergies:  Allergies   Allergen Reactions    Flagyl [Metronidazole] Hives    Atorvastatin Other (See Comments) and Myalgia      - LIPITOR -   Muscle cramps    Ciprofloxacin Hives      - CIPRO -        Medications:  Prior to Admission medications    Medication  Sig Start Date End Date Taking? Authorizing Provider   acetaminophen (TYLENOL) 325 MG tablet Take 1 tablet by mouth Every 6 (Six) Hours As Needed for Mild Pain.    Rohan Christina MD   albuterol (PROVENTIL HFA;VENTOLIN HFA) 108 (90 BASE) MCG/ACT inhaler Inhale 2 puffs Every 6 (Six) Hours As Needed for Wheezing.    Rohan Christina MD   apixaban (ELIQUIS) 5 MG tablet tablet Take 1 tablet by mouth 2 (Two) Times a Day.    Rohan Christina MD   Aspirin 81 MG capsule Take 81 mg by mouth Daily.    Rohan Christina MD   calcium polycarbophil (FIBERCON) 625 MG tablet Take 1 tablet by mouth Daily.    Rohan Christina MD   carboxymethylcellulose (REFRESH PLUS) 0.5 % solution Administer 1 drop to both eyes 4 (Four) Times a Day As Needed for Dry Eyes.    Rohan Christina MD   docusate sodium (COLACE) 100 MG capsule Take 1 capsule by mouth Daily As Needed for Constipation.    Rohan Christina MD   empagliflozin (JARDIANCE) 25 MG tablet tablet Take 1 tablet by mouth Daily.    Rohan Christina MD   ezetimibe (ZETIA) 10 MG tablet Take 1 tablet by mouth Daily.    Rohan Christina MD   fluticasone (FLONASE) 50 MCG/ACT nasal spray Administer 2 sprays into the nostril(s) as directed by provider Daily.    Rohan Christina MD   furosemide (Lasix) 40 MG tablet Take 1 tablet by mouth Daily As Needed (take as needed for weight gain more than 2 pounds in a day or more than than 3 pounds in 2 days.). 10/17/24   Ben Burden APRN   gabapentin (NEURONTIN) 300 MG capsule Take 1 capsule by mouth Every Night.  Patient taking differently: Take 1 capsule by mouth 2 (Two) Times a Day.    Rohan Christina MD   guaiFENesin (MUCINEX) 600 MG 12 hr tablet Take 2 tablets by mouth Daily As Needed for Congestion.    Rohan Christina MD   HYDROcodone Bit-Homatrop MBr (HYCODAN) 5-1.5 MG/5ML solution Take 5 mL by mouth Every 4 (Four) Hours As Needed for Cough.  Patient not taking: Reported on  4/9/2025 12/26/24   Andriy Bo Jr., MD   insulin aspart (novoLOG FLEXPEN) 100 UNIT/ML solution pen-injector sc pen Inject 50 Units under the skin into the appropriate area as directed 3 (Three) Times a Day With Meals. Can use 5 to 6 more units if needed in early evening.    Rohan Christina MD   INSULIN GLARGINE-YFGN SC Inject 100 Units under the skin into the appropriate area as directed 2 (Two) Times a Day.    Rohan Christina MD   isosorbide mononitrate (IMDUR) 120 MG 24 hr tablet Take 1 tablet by mouth Daily. 1/11/22   Agnieszka Jeff APRN   lamoTRIgine (LaMICtal) 200 MG tablet Take 1 tablet by mouth 2 (Two) Times a Day. 4/9/25 4/9/26  Komal Herzog APRN   levETIRAcetam (KEPPRA) 500 MG tablet Take 3 tablets by mouth Every Morning. 4/9/25 4/9/26  Komal Herzog APRN   levETIRAcetam (KEPPRA) 500 MG tablet Take 4 tablets by mouth Every Night. 4/9/25 4/9/26  Komal Herzog APRN   metFORMIN (GLUCOPHAGE) 1000 MG tablet Take 1 tablet by mouth 2 (Two) Times a Day With Meals.    Rohan Christina MD   methylPREDNISolone (MEDROL) 4 MG dose pack Take as directed on package instructions.  Patient not taking: Reported on 4/9/2025 12/26/24   Andriy Bo Jr., MD   metoprolol tartrate (LOPRESSOR) 100 MG tablet Take 1 tablet by mouth 2 (Two) Times a Day.    ProviderRohan MD   Multiple Vitamin (MULTI VITAMIN PO) Take 1 tablet by mouth Daily.    ProviderRohan MD   nitroglycerin (NITROSTAT) 0.4 MG SL tablet Place 1 tablet under the tongue Every 5 (Five) Minutes As Needed for Chest Pain. Take no more than 3 doses in 15 minutes. 4/4/24   Ben Burden APRN   ondansetron ODT (ZOFRAN-ODT) 4 MG disintegrating tablet Place 1 tablet on the tongue Every 6 (Six) Hours As Needed for Nausea or Vomiting for up to 12 doses. 9/19/24   Radha Rea APRN   pantoprazole (PROTONIX) 40 MG EC tablet Take 1 tablet by mouth 2 (Two) Times a Day.    Provider, Historical, MD   polyethylene  "glycol (MIRALAX) 17 g packet Take 17 g by mouth Daily.    Rohan Christina MD   psyllium (METAMUCIL) 58.6 % packet Take 1 packet by mouth Daily As Needed (constipation).    Rohan Christina MD   Rimegepant Sulfate (Nurtec) 75 MG tablet dispersible tablet Take 1 tablet by mouth 3 (Three) Times a Day As Needed (migraine).    Rohan Christina MD   rimegepant sulfate ODT (Nurtec) 75 MG disintegrating tablet Place 1 tablet under the tongue 1 (One) Time As Needed (at onset) for up to 180 days. 4/9/25 10/6/25  Komal Herzog APRN   rosuvastatin (CRESTOR) 40 MG tablet Take 0.5 tablets by mouth Every Night.    Rohan Christina MD   sacubitril-valsartan (Entresto)  MG tablet Take 1 tablet by mouth 2 (Two) Times a Day.    Rohan Christina MD   Semaglutide,0.25 or 0.5MG/DOS, (Ozempic, 0.25 or 0.5 MG/DOSE,) 2 MG/1.5ML solution pen-injector Inject 0.5 mg under the skin into the appropriate area as directed 1 (One) Time Per Week. Monday    Rohan Christina MD   tamsulosin (FLOMAX) 0.4 MG capsule 24 hr capsule Take 1 capsule by mouth Daily.    Rohan Christina MD   triamcinolone (KENALOG) 0.1 % ointment Apply 1 Application topically to the appropriate area as directed 2 (Two) Times a Day. To feet    Rohan Christina MD     I have utilized all available immediate resources to obtain, update, or review the patient's current medications (including all prescriptions, over-the-counter products, herbals, cannabis/cannabidiol products, and vitamin/mineral/dietary (nutritional) supplements).    Objective     Vital Signs: /77   Pulse 93   Temp 98.1 °F (36.7 °C) (Oral)   Resp 18   Ht 182.9 cm (72\")   Wt 111 kg (245 lb)   SpO2 93%   BMI 33.23 kg/m²   Physical Exam  Vitals and nursing note reviewed.   HENT:      Head: Normocephalic.   Eyes:      Conjunctiva/sclera: Conjunctivae normal.      Pupils: Pupils are equal, round, and reactive to light.   Cardiovascular:      Rate and " Rhythm: Normal rate and regular rhythm.      Heart sounds: Normal heart sounds.   Pulmonary:      Effort: Pulmonary effort is normal. No respiratory distress.      Breath sounds: Normal breath sounds.      Comments: Patient is on room air.  Abdominal:      General: Bowel sounds are normal. There is no distension.      Palpations: Abdomen is soft.      Tenderness: There is no abdominal tenderness.   Musculoskeletal:         General: No swelling.      Cervical back: Neck supple.   Skin:     General: Skin is warm and dry.      Findings: No rash.   Neurological:      Mental Status: He is alert and oriented to person, place, and time.      Motor: Weakness present.      Coordination: Coordination abnormal.      Gait: Gait abnormal.      Comments: Cranial 2-12 grossly intact.  Patient moves all extremities symmetrically.   Psychiatric:         Mood and Affect: Mood normal.         Behavior: Behavior normal.         Thought Content: Thought content normal.              Results Reviewed:  Lab Results (last 24 hours)       Procedure Component Value Units Date/Time    Comprehensive Metabolic Panel [[id number]]  (Abnormal) Collected: 04/26/25 1652    Specimen: Blood from Arm, Right Updated: 04/26/25 1716     Glucose 164 mg/dL      BUN 15 mg/dL      Creatinine 1.10 mg/dL      Sodium 136 mmol/L      Potassium 4.5 mmol/L      Chloride 100 mmol/L      CO2 23.0 mmol/L      Calcium 9.3 mg/dL      Total Protein 7.1 g/dL      Albumin 4.2 g/dL      ALT (SGPT) 25 U/L      AST (SGOT) 31 U/L      Alkaline Phosphatase 64 U/L      Total Bilirubin 1.6 mg/dL      Globulin 2.9 gm/dL      A/G Ratio 1.4 g/dL      BUN/Creatinine Ratio 13.6     Anion Gap 13.0 mmol/L      eGFR 73.6 mL/min/1.73     Narrative:      GFR Categories in Chronic Kidney Disease (CKD)      GFR Category          GFR (mL/min/1.73)    Interpretation  G1                     90 or greater         Normal or high (1)  G2                      60-89                Mild decrease  (1)  G3a                   45-59                Mild to moderate decrease  G3b                   30-44                Moderate to severe decrease  G4                    15-29                Severe decrease  G5                    14 or less           Kidney failure          (1)In the absence of evidence of kidney disease, neither GFR category G1 or G2 fulfill the criteria for CKD.    eGFR calculation 2021 CKD-EPI creatinine equation, which does not include race as a factor    Magnesium [[id number]]  (Normal) Collected: 04/26/25 1652    Specimen: Blood from Arm, Right Updated: 04/26/25 1716     Magnesium 1.8 mg/dL     CBC & Differential [[id number]]  (Abnormal) Collected: 04/26/25 1652    Specimen: Blood from Arm, Right Updated: 04/26/25 1704    Narrative:      The following orders were created for panel order CBC & Differential.  Procedure                               Abnormality         Status                     ---------                               -----------         ------                     CBC Auto Differential[[id number]]        Abnormal            Final result                 Please view results for these tests on the individual orders.    CBC Auto Differential [[id number]]  (Abnormal) Collected: 04/26/25 1652    Specimen: Blood from Arm, Right Updated: 04/26/25 1704     WBC 7.83 10*3/mm3      RBC 4.78 10*6/mm3      Hemoglobin 14.5 g/dL      Hematocrit 43.9 %      MCV 91.8 fL      MCH 30.3 pg      MCHC 33.0 g/dL      RDW 13.6 %      RDW-SD 46.4 fl      MPV 11.0 fL      Platelets 87 10*3/mm3      Neutrophil % 89.5 %      Lymphocyte % 5.6 %      Monocyte % 4.1 %      Eosinophil % 0.1 %      Basophil % 0.3 %      Immature Grans % 0.4 %      Neutrophils, Absolute 7.01 10*3/mm3      Lymphocytes, Absolute 0.44 10*3/mm3      Monocytes, Absolute 0.32 10*3/mm3      Eosinophils, Absolute 0.01 10*3/mm3      Basophils, Absolute 0.02 10*3/mm3      Immature Grans, Absolute 0.03 10*3/mm3     Levetiracetam Level (Keppra)  [[id number]] Collected: 04/26/25 1652    Specimen: Blood from Arm, Right Updated: 04/26/25 1656          Imaging Results (Last 24 Hours)       Procedure Component Value Units Date/Time    CT Head Without Contrast [[id number]] Collected: 04/26/25 1658     Updated: 04/26/25 1703    Narrative:      EXAMINATION: CT HEAD WO CONTRAST-  4/26/2025 4:58 PM     HISTORY: seizures     COMPARISON: 9/21/2023     DLP: 1069.19 mGy.cm     In order to have a CT radiation dose as low as reasonably achievable,  Automated Exposure Control was utilized for adjustment of the mA and/or  KV according to patient size.     TECHNIQUE: Serial axial tomographic images of the brain were obtained  without the use of intravenous contrast.  Coronal and sagittal  reformatted images were obtained reviewed as well.     FINDINGS:  No mass effect or midline shift. No acute hemorrhage. There is mild  low-attenuation in the subcortical and periventricular white matter as  can be seen with chronic microvascular change. Vascular calcifications  in the vertebral arteries in the intracranial portions of the carotid  arteries.     Surrounding soft tissues are without acute findings. Orbits and globes  are preserved. Paranasal sinus disease with an air-fluid level in the  RIGHT maxillary sinus suggesting mild acute sinusitis. Mild scattered  mucosal thickening in the ethmoid air cells. Small mastoid effusion on  the RIGHT, unchanged from 2023 and likely chronic.          Impression:         1.  Mild atrophy and chronic white matter changes but no definite acute  intracranial process.     2.  Paranasal sinus disease as discussed above.     This report was signed and finalized on 4/26/2025 5:00 PM by Dr. Valentino Lebron MD.             I have personally reviewed and interpreted the radiology studies and ECG obtained at time of admission.     Assessment / Plan   Assessment:   Active Hospital Problems    Diagnosis     **Recurrent seizures     Chronic heart failure  with preserved ejection fraction (HFpEF)     Presence of CardioMEMS HF system     S/P CABG x 2     Chronic diastolic congestive heart failure with preserved ejection fraction     Atrial flutter     Class 1 obesity due to excess calories with serious comorbidity and body mass index (BMI) of 34.0 to 34.9 in adult     Diabetic peripheral neuropathy     Diabetic complication     Deviated nasal septum     Maribell bullosa     Benign non-nodular prostatic hyperplasia with lower urinary tract symptoms     Gastroesophageal reflux disease without esophagitis     Type 2 diabetes mellitus with circulatory disorder, with long-term current use of insulin        Treatment Plan  The patient will be admitted to my service here at [facility].     Seizure . breakthrough seizure . Patient stated he has been taking his medication at home.  Wife stated patient was here yesterday and would value was sent home.  Patient has at least 5 episodes seizure lasting about a minute tonic-clonic type.  Lamictal . Keppra.  Seizure precaution.  Ativan as needed.  CT scan of the head- Mild atrophy and chronic white matter changes but no definite acute  intracranial process, Paranasal sinus disease with an air-fluid level in the RIGHT maxillary sinus suggesting mild acute sinusitis- Mild scattered mucosal thickening in the ethmoid air cells,  Small mastoid effusion on the RIGHT, unchanged from 2023 and likely chronic.    Acute sinusitis.  Rocephin antibiotic for now.    History of migraine.  Nurtec as needed.    History of atrial fibs/CHF/hypertension/hyperlipidemia/CAD.  Patient is currently in sinus rhythm.  Eliquis . Aspirin . Hold Zetia because we do not have it here.  Lasix as needed.  Imdur.  Toprol . Nitro as needed.  Entresto.  Coreg.  Echocardiogram 3/18/23-61 - 65%,  hypokinetic: apical inferior and apical septal and apex hypokinetic, left ventricular diastolic function is consistent with (grade II w/high LAP) pseudonormalization,  Left atrial volume is mildly increased,  tricuspid regurgitation is normal (<35 mmHg), Normal size and function of the right ventricle, No significant valvular pathology,  No significant change compared to prior exam from 4/29/21.    Diabetes . Jardiance . Hold Glucophage for now.  Lantus 25 units twice daily.  Moderate sliding scale for now.    Neuropathy.  Neurontin at night.    Reflux.  Protonix p.o.  Zofran as needed.    Constipation.  Colace as needed.  MiraLAX.    Prostate hypertrophy.  Flomax.    History of asthma/allergic rhinitis.  Albuterol inhaler as needed.  Flonase . Guaifenesin.    Nutrition .  Diabetic/cardiac diet.    Medical Decision Making  Number and Complexity of problems: Seizures/sinusitis/migraine/history of atrial fibs/CAD/CHF  Differential Diagnosis: None    Conditions and Status        Condition is unchanged.     [facility] Data  External documents reviewed: Previous note .  Cardiac tracing (EKG, telemetry) interpretation: Sinus .  Radiology interpretation: CT scan  Labs reviewed: Laboratory .  Any tests that were considered but not ordered: Laboratory in a.m.     Decision rules/scores evaluated (example [id number], Wells, etc): None.     Discussed with: Patient and wife     Care Planning  Shared decision making: Patient and wife  Code status and discussions: Full code    Disposition  Social Determinants of Health that impact treatment or disposition: From home  Estimated length of stay is 1 to 3 days.     I confirmed that the patient's advanced care plan is present, code status is documented, and a surrogate decision maker is listed in the patient's medical record.     The patient's surrogate decision maker is wife, Daniela.     The patient was seen and examined by me on 4/26/25 at 1800.    Electronically signed by Vish Latham MD, 04/26/25, 18:05 CDT.              Electronically signed by Vish Latham MD at 04/26/25 1805          Emergency Department Notes        Nj Guidry MD at  04/26/25 1535          Subjective   History of Present Illness  Patient was recently seen in the ED after he had seizures.  He had 4-5 seizures at home and was seen in the ED and then discharged home had another seizure at home he is got episodes of absence in July tonic-clonic activity.  And has been having them frequently came to the ED for evaluation has not missed a dose of his medication.    Seizures  Seizure activity on arrival: no    Seizure type:  Grand mal  Initial focality:  Facial  Episode characteristics: abnormal movements, generalized shaking and partial responsiveness    Episode characteristics: no apnea, no combativeness, no confusion, no focal shaking, no tongue biting and responsive    Postictal symptoms: confusion    Return to baseline: yes    Severity:  Moderate  Timing:  Clustered  Progression:  Worsening  Context: not cerebral palsy, not change in medication, not sleeping less, not developmental delay, not drug use, not emotional upset, not family hx of seizures, not intracranial lesion, not intracranial shunt, medical compliance, not possible hypoglycemia and not possible medication ingestion    Recent head injury:  No recent head injuries  History of seizures: yes        Review of Systems   Constitutional: Negative.  Negative for chills, fatigue and fever.   HENT: Negative.  Negative for congestion.    Respiratory: Negative.  Negative for cough, chest tightness and stridor.    Cardiovascular: Negative.  Negative for chest pain.   Gastrointestinal: Negative.  Negative for abdominal distention, abdominal pain, nausea and vomiting.   Endocrine: Negative.    Genitourinary: Negative.  Negative for difficulty urinating and flank pain.   Musculoskeletal: Negative.    Skin: Negative.  Negative for color change.   Neurological: Negative.  Positive for seizures. Negative for dizziness and headaches.   All other systems reviewed and are negative.      Past Medical History:   Diagnosis Date    Arthritis      Asthma     Cancer     skin    Carotid disease, bilateral     Cellulitis     right foot - on antibiotics 6-    Chest pain     Chronic diastolic congestive heart failure 09/07/2019    Chronic sinusitis     Maribell bullosa     Coronary artery disease involving native coronary artery of native heart with unstable angina pectoris 01/17/2017    Deviated septum     Diabetes mellitus     Difficulty urinating     Diverticulitis     Enlarged prostate     Fatty liver     GERD (gastroesophageal reflux disease)     [place] (hard of hearing)     Does have hearing aids    Hyperlipidemia LDL goal <70 02/02/2017    Hypertrophy of nasal turbinates     Keratoderma     Kidney stone     Lung nodules     lower right lung    Migraine     Murmur, heart     Myocardial infarction     Obesity     Paroxysmal atrial fibrillation 07/11/2019    Personal history of COVID-19 07/2021    PONV (postoperative nausea and vomiting)     Primary hypertension 10/16/2016    Psoriasis     Seizures     Sinus congestion     Skin cancer     Sleep apnea     not using cpap    SOB (shortness of breath)     Stroke     mild weakness on right side    UTI (urinary tract infection)        Allergies   Allergen Reactions    Flagyl [Metronidazole] Hives    Atorvastatin Other (See Comments) and Myalgia      - LIPITOR -   Muscle cramps    Ciprofloxacin Hives      - CIPRO -        Past Surgical History:   Procedure Laterality Date    CARDIAC CATHETERIZATION  01/2016    Dr. Broadbent; widely patent previously placed stents in the left anterior descending and obstructive disease involving the diagonal branch which was treated medically    CARDIAC CATHETERIZATION N/A 07/14/2017    Procedure: Left Heart Cath;  Surgeon: Wade Ramey MD;  Location:  [facility] CATH INVASIVE LOCATION;  Service:     CARDIAC CATHETERIZATION Left 10/15/2018    Procedure: Cardiac Catheterization/Vascular Study;  Surgeon: Wade Ramey MD;  Location:  [facility] CATH [facility]VAS[facility];  Service: Cardiology     CARDIAC CATHETERIZATION  10/15/2018    Procedure: Functional Flow [facility];  Surgeon: Wade Ramey MD;  Location:  [facility]ATH [facility];  Service: Cardiology    CARDIAC CATHETERIZATION N/A 10/15/2018    Procedure: Left ventriculography;  Surgeon: Wade Ramey MD;  Location:  [facility] CATH [facility];  Service: Cardiology    CARDIAC CATHETERIZATION Left 06/26/2019    Procedure: Cardiac Catheterization/Vascular Study VEL OK  HE WILL WAIT 1 YEAR FOR SHOULDER SURGERY ;  Surgeon: Wade Ramey MD;  Location:  [facility];  Service: Cardiology    CARDIAC CATHETERIZATION Left 04/30/2021    Procedure: Coronary angiography;  Surgeon: Sahil Llamas MD;  Location:  [facility] CATH [facility];  Service: Cardiology;  Laterality: Left;    CARDIAC CATHETERIZATION N/A 04/30/2021    Procedure: Percutaneous Coronary Intervention;  Surgeon: Sahil Llamas MD;  Location:  [facility] CATH [facility];  Service: Cardiology;  Laterality: N/A;    CARDIAC CATHETERIZATION N/A 11/09/2022    Procedure: Left Heart Cath with SVGs;  Surgeon: Wade Ramey MD;  Location: [facility] CATH INVASIVE LOCATION;  Service: Cardiology;  Laterality: N/A;    CARPAL TUNNEL RELEASE      January 2024 and pther hand February 2024    CHOLECYSTECTOMY      CHOLECYSTECTOMY WITH INTRAOPERATIVE CHOLANGIOGRAM N/A 08/01/2018    Procedure: CHOLECYSTECTOMY LAPAROSCOPIC INTRAOPERATIVE CHOLANGIOGRAM;  Surgeon: Shane Ann MD;  Location: [facility] OR;  Service: General    COLONOSCOPY N/A 07/14/2020    Procedure: COLONOSCOPY WITH ANESTHESIA;  Surgeon: Anupam Morales DO;  Location: [facility] ENDOSCOPY;  Service: Gastroenterology;  Laterality: N/A;  pre: abdominal pain  post: diverticulosis  Vinayak Correia PA    CORONARY ANGIOPLASTY      CORONARY ARTERY BYPASS GRAFT N/A 07/06/2019    Procedure: CABG X2 WITH LIMA, LEFT LEG OVH, AND PLACEMENT OF LEFT FEMORAL ARTERIAL LINE;  Surgeon: Steven Tang MD;  Location: [facility] OR;  Service:  Cardiothoracic    CORONARY STENT PLACEMENT      x 6    CYSTOSCOPY TRANSURETHRAL RESECTION OF PROSTATE N/A 01/23/2023    Procedure: CYSTOSCOPY TRANSURETHRAL RESECTION OF PROSTATE;  Surgeon: Latrell Pope MD;  Location:  [facility] OR;  Service: Urology;  Laterality: N/A;    ENDOSCOPIC FUNCTIONAL SINUS SURGERY (FESS) Bilateral 12/13/2017    Procedure: PROCEDURE PERFORMED:  Bilateral functional endoscopic anterior ethmoidectomy with bilateral middle meatal antrostomy Septoplasty Right kathia bullosa resection Bilateral inferior turbinate reduction via Coblation;  Surgeon: Mayank Ibarra MD;  Location:  [facility] OR;  Service:     ENDOSCOPY N/A 07/30/2018    Procedure: ESOPHAGOGASTRODUODENOSCOPY WITH ANESTHESIA;  Surgeon: Benitez Mas MD;  Location: [facility] ENDOSCOPY;  Service: Gastroenterology    ENDOSCOPY N/A 07/14/2020    Procedure: ESOPHAGOGASTRODUODENOSCOPY WITH ANESTHESIA;  Surgeon: Anupam Morales DO;  Location: [facility] ENDOSCOPY;  Service: Gastroenterology;  Laterality: N/A;  pre: abdominal pain  post: esophagitis  Vinayak Correia, PA    HERNIA REPAIR      x2 inguinal area    KIDNEY STONE SURGERY      KNEE ARTHROSCOPY Right 03/01/2022    Procedure: RIGHT KNEE PARTIAL LATERAL MENISCECTOMY;  Surgeon: Pedro Pablo Song MD;  Location: [facility] OR;  Service: Orthopedics;  Laterality: Right;    KNEE SURGERY Right     OTHER SURGICAL HISTORY      urolift    PROSTATE SURGERY      Dr. Badillo - 2017    PULMONARY ARTERY PRESSURE SENSOR IMPLANT N/A 05/08/2023    Procedure: PA Pressure Sensor Implant;  Surgeon: Wade Ramey MD;  Location: [facility] CATH INVASIVE LOCATION;  Service: Cardiology;  Laterality: N/A;    ROTATOR CUFF REPAIR Right     SLEEP ENDOSCOPY N/A 02/27/2023    Procedure: Videosleep endoscopy;  Surgeon: Mayank Ibarra MD;  Location:  [facility] OR;  Service: ENT;  Laterality: N/A;    THUMB AMPUTATION Left     partial    TOE FUSION Right 7/3/2024    Procedure: Hallux Interphalangeal Joint  Arthrodesis, Bone Graft Hamilton - Right Foot;  Surgeon: Hernan Villa DPM;  Location:  [facility] OR;  Service: Podiatry;  Laterality: Right;    TOE SURGERY      great toe       Family History   Problem Relation Age of Onset    Heart disease Father     COPD Mother     Hypertension Mother     Asthma Mother     No Known Problems Sister     Colon cancer Paternal Uncle     Prostate cancer Maternal Grandfather     No Known Problems Sister     Colon cancer Maternal Grandmother     Colon polyps Maternal Grandmother        Social History     Socioeconomic History    Marital status:    Tobacco Use    Smoking status: Former     Current packs/day: 0.00     Average packs/day: 1.5 packs/day for 18.0 years (27.0 ttl pk-yrs)     Types: Cigarettes, Cigars     Start date:      Quit date:      Years since quittin.3     Passive exposure: Past    Smokeless tobacco: Former     Types: Chew     Quit date:    Vaping Use    Vaping status: Never Used   Substance and Sexual Activity    Alcohol use: No     Comment: 0    Drug use: No    Sexual activity: Defer           Objective   Physical Exam  Vitals and nursing note reviewed. Exam conducted with a chaperone present.   Constitutional:       General: He is not in acute distress.     Appearance: Normal appearance. He is not ill-appearing or diaphoretic.   HENT:      Head: Normocephalic and atraumatic.      Nose: Nose normal.      Mouth/Throat:      Mouth: Mucous membranes are moist.      Pharynx: Oropharynx is clear.   Eyes:      General: No visual field deficit or scleral icterus.     Extraocular Movements: Extraocular movements intact.      Conjunctiva/sclera: Conjunctivae normal.      Pupils: Pupils are equal, round, and reactive to light.   Neck:      Vascular: No carotid bruit.   Cardiovascular:      Rate and Rhythm: Normal rate and regular rhythm.      Pulses: Normal pulses.      Heart sounds: No murmur heard.     No friction rub.   Pulmonary:      Effort:  Pulmonary effort is normal. No respiratory distress.      Breath sounds: Normal breath sounds. No stridor.   Abdominal:      General: Abdomen is flat. Bowel sounds are normal. There is no distension.      Palpations: There is no mass.      Tenderness: There is no abdominal tenderness.   Musculoskeletal:         General: No swelling or tenderness. Normal range of motion.      Cervical back: Normal range of motion and neck supple. No rigidity. No muscular tenderness.   Lymphadenopathy:      Cervical: No cervical adenopathy.   Skin:     General: Skin is warm.      Capillary Refill: Capillary refill takes less than 2 seconds.      Coloration: Skin is not jaundiced or pale.      Findings: No bruising or rash.   Neurological:      General: No focal deficit present.      Mental Status: He is alert and oriented to person, place, and time. He is confused.      GCS: GCS eye subscore is 4. GCS verbal subscore is 5. GCS motor subscore is 6.      Cranial Nerves: Cranial nerves 2-12 are intact. No cranial nerve deficit, dysarthria or facial asymmetry.      Sensory: Sensation is intact.      Motor: Motor function is intact. No weakness, abnormal muscle tone, seizure activity or pronator drift.      Coordination: Coordination is intact.      Gait: Gait is intact.      Deep Tendon Reflexes:      Reflex Scores:       Bicep reflexes are 2+ on the right side and 2+ on the left side.       Patellar reflexes are 2+ on the right side and 2+ on the left side.  Psychiatric:         Attention and Perception: Attention normal.         Mood and Affect: Mood and affect normal.         Speech: Speech normal.         Behavior: Behavior normal.         Procedures          ED Course  ED Course as of 04/26/25 1718   Sat Apr 26, 2025   1715 Last night's lab workup essentially unremarkable.  Patient given a gram of Keppra have discussed this case with Dr. Santizo with neurology and the patient to be admitted to medicine service Dr. Godinez was  informed CT of the head shows chronic changes. [TS]      ED Course User Index  [TS] Nj Guidry MD                                                       Medical Decision Making  Patient history of seizures has been compliant with medication no new trauma.  Came to ER for further evaluation.    Problems Addressed:  Recurrent seizures: complicated acute illness or injury    Amount and/or Complexity of Data Reviewed  Labs: ordered.     Details: Prior labs and today's labs reviewed  Radiology: ordered.     Details: His head was reviewed  ECG/medicine tests: ordered.  Discussion of management or test interpretation with external provider(s): Brad with Dr. Huynh and also with Dr. Santizo with neurology.    Risk  Prescription drug management.  Decision regarding hospitalization.  Risk Details: Will admit        Final diagnoses:   Recurrent seizures       ED Disposition  ED Disposition       ED Disposition   Decision to Admit    Condition   --    Comment   Level of Care: Telemetry [5]   Diagnosis: Recurrent seizures [230567]   Admitting Physician: JIMBO DAVILA [[id number]]   Attending Physician: JIMBO DAVILA [[id number]]   Certification: I Certify That Inpatient Hospital Services Are Medically Necessary For Greater Than 2 Midnights                 No follow-up provider specified.       Medication List      No changes were made to your prescriptions during this visit.            Nj Guidry MD  04/26/25 1537       Nj Guidry MD  04/26/25 1716       Nj Guidry MD  04/26/25 1718      Electronically signed by Nj Guidry MD at 04/26/25 1718       Vital Signs (last 2 days)       Date/Time Temp Temp src Pulse Resp BP Patient Position SpO2    04/28/25 0325 97.6 (36.4) Oral 58 18 101/75 Lying 95    04/27/25 2331 98.9 (37.2) Oral 60 18 97/45 Lying 94    04/27/25 1948 98.2 (36.8) Oral 63 18 109/58 Lying 95    04/27/25 1607 98.9 (37.2) Oral 62 18 109/58 Lying 94    04/27/25 0830 99.1 (37.3) Oral 72 18 129/68 Sitting 92     04/27/25 0430 98.3 (36.8) Oral 80 18 141/75 Lying 97    04/26/25 2331 98.2 (36.8) Oral 69 18 108/56 Lying 93    04/26/25 1839 101.5 (38.6)  Oral 87 24 150/74 Lying 98    Temp: nurse notified at 04/26/25 1839    04/26/25 1715 -- -- 93 18 155/77 -- 93    04/26/25 1506 98.1 (36.7) Oral 86 18 166/81 -- 94          Current Facility-Administered Medications   Medication Dose Route Frequency Provider Last Rate Last Admin    acetaminophen (TYLENOL) tablet 325 mg  325 mg Oral Q6H PRN Vish Latham MD   325 mg at 04/26/25 2038    albuterol (PROVENTIL) nebulizer solution 0.083% 2.5 mg/3mL  2.5 mg Nebulization Q6H PRN Vish Latham MD        apixaban (ELIQUIS) tablet 5 mg  5 mg Oral BID Vish Latham MD   5 mg at 04/27/25 2028    aspirin EC tablet 81 mg  81 mg Oral Daily Vish Latham MD   81 mg at 04/27/25 0942    sennosides-docusate (PERICOLACE) 8.6-50 MG per tablet 2 tablet  2 tablet Oral BID PRN Vish Latham MD        And    polyethylene glycol (MIRALAX) packet 17 g  17 g Oral Daily PRN Vish Latham MD        And    bisacodyl (DULCOLAX) EC tablet 5 mg  5 mg Oral Daily PRN Vish Latham MD        And    bisacodyl (DULCOLAX) suppository 10 mg  10 mg Rectal Daily PRN Vish Latham MD        cefTRIAXone (ROCEPHIN) 1,000 mg in sodium chloride 0.9 % 100 mL MBP  1,000 mg Intravenous Q24H Vish Latham  mL/hr at 04/27/25 2028 1,000 mg at 04/27/25 2028    dextrose (D50W) (25 g/50 mL) IV injection 25 g  25 g Intravenous Q15 Min PRN Vish Latham MD        dextrose (GLUTOSE) oral gel 15 g  15 g Oral Q15 Min PRN Vish Latham MD        docusate sodium (COLACE) capsule 100 mg  100 mg Oral Daily PRN Vish Latham MD        empagliflozin (JARDIANCE) tablet 25 mg  25 mg Oral Daily Vish Latham MD   25 mg at 04/27/25 0942    fluticasone (FLONASE) 50 MCG/ACT nasal spray 2 spray  2 spray Nasal Daily Vish Latham MD   2 spray at 04/27/25 0950    furosemide (LASIX) tablet 40 mg  40 mg Oral Daily PRN Yeison  Vish STRICKLAND MD        gabapentin (NEURONTIN) capsule 300 mg  300 mg Oral Nightly Vish Latham MD   300 mg at 04/27/25 2028    Gadopiclenol (VUEWAY) injection 10 mL  10 mL Intravenous Once in imaging Angela Cesar MD        glucagon (GLUCAGEN) injection 1 mg  1 mg Intramuscular Q15 Min PRN Vish Latham MD        guaiFENesin (MUCINEX) 12 hr tablet 1,200 mg  1,200 mg Oral Daily PRN Vish Latham MD        insulin glargine (LANTUS, SEMGLEE) injection 25 Units  25 Units Subcutaneous Q12H Vish Latham MD   25 Units at 04/27/25 2028    Insulin Lispro (humaLOG) injection 2-9 Units  2-9 Units Subcutaneous 4x Daily AC & at Bedtime Vish Latham MD   6 Units at 04/27/25 2034    isosorbide mononitrate (IMDUR) 24 hr tablet 120 mg  120 mg Oral Daily Vish Latham MD   120 mg at 04/27/25 0942    lamoTRIgine (LaMICtal) tablet 200 mg  200 mg Oral BID Vish Latham MD   200 mg at 04/27/25 2027    levETIRAcetam (KEPPRA) tablet 1,500 mg  1,500 mg Oral Daily Vish Latham MD   1,500 mg at 04/27/25 0943    levETIRAcetam (KEPPRA) tablet 2,000 mg  2,000 mg Oral Nightly Vish Latham MD   2,000 mg at 04/27/25 2027    LORazepam (ATIVAN) injection 1 mg  1 mg Intravenous Q2H PRN Vish Latham MD        metoprolol tartrate (LOPRESSOR) tablet 100 mg  100 mg Oral BID Vish Latham MD   100 mg at 04/27/25 2028    nitroglycerin (NITROSTAT) SL tablet 0.4 mg  0.4 mg Sublingual Q5 Min PRN Vish Latham MD        ondansetron (ZOFRAN) injection 4 mg  4 mg Intravenous Q6H PRN Vish Latham MD        pantoprazole (PROTONIX) EC tablet 40 mg  40 mg Oral BID Vish Latham MD   40 mg at 04/27/25 2027    polyethylene glycol (MIRALAX) packet 17 g  17 g Oral Daily Vish Latham MD   17 g at 04/26/25 2038    rosuvastatin (CRESTOR) tablet 20 mg  20 mg Oral Nightly Vish Latham MD   20 mg at 04/27/25 2028    sacubitril-valsartan (ENTRESTO)  MG tablet 1 tablet  1 tablet Oral BID Vish Latham MD   1 tablet at 04/27/25  2027    sodium chloride 0.9 % flush 10 mL  10 mL Intravenous Q12H Vish Latham MD   10 mL at 04/27/25 2029    sodium chloride 0.9 % flush 10 mL  10 mL Intravenous PRN Vish Latham MD        sodium chloride 0.9 % infusion 40 mL  40 mL Intravenous PRN Vish Latham MD        tamsulosin (FLOMAX) 24 hr capsule 0.4 mg  0.4 mg Oral Daily Vish Latham MD   0.4 mg at 04/27/25 0942        Physician Progress Notes (last 48 hours)        Vish Latham MD at 04/27/25 0817              [facility] Medicine Services  INPATIENT PROGRESS NOTE    Patient Name: Carlos A Boyer Jr.  Date of Admission: 4/26/2025  Today's Date: 04/27/25  Length of Stay: 1  Primary Care Physician: Vinayak Correia PA    Subjective   Chief Complaint: Seizure .   HPI   Fever last night, started Rocephin last night for sinusitis.  Tmax 101.5.  T-current 98.3.  Set blood culture STACY.  White blood cells normal.  Decrease in platelets.    Review of Systems   Constitutional:  Positive for activity change, appetite change and fatigue. Negative for chills and fever.   HENT:  Negative for hearing loss, nosebleeds, tinnitus and trouble swallowing.    Eyes:  Negative for visual disturbance.   Respiratory:  Negative for cough, chest tightness, shortness of breath and wheezing.    Cardiovascular:  Negative for chest pain, palpitations and leg swelling.   Gastrointestinal:  Negative for abdominal distention, abdominal pain, blood in stool, constipation, diarrhea, nausea and vomiting.   Endocrine: Negative for cold intolerance, heat intolerance, polydipsia, polyphagia and polyuria.   Genitourinary:  Negative for decreased urine volume, difficulty urinating, dysuria, flank pain, frequency and hematuria.   Musculoskeletal:  Negative for arthralgias, joint swelling and myalgias.   Skin:  Negative for rash.   Allergic/Immunologic: Negative for immunocompromised state.   Neurological:  Positive for weakness. Negative for  dizziness, syncope, light-headedness and headaches.   Hematological:  Negative for adenopathy. Does not bruise/bleed easily.   Psychiatric/Behavioral:  Negative for confusion and sleep disturbance. The patient is not nervous/anxious.         All pertinent negatives and positives are as above. All other systems have been reviewed and are negative unless otherwise stated.     Objective    Temp:  [98.1 °F (36.7 °C)-101.5 °F (38.6 °C)] 98.3 °F (36.8 °C)  Heart Rate:  [69-93] 80  Resp:  [18-24] 18  BP: (108-166)/(56-81) 141/75  Physical Exam  Vitals and nursing note reviewed.   HENT:      Head: Normocephalic.   Eyes:      Conjunctiva/sclera: Conjunctivae normal.      Pupils: Pupils are equal, round, and reactive to light.   Cardiovascular:      Rate and Rhythm: Normal rate and regular rhythm.      Heart sounds: Normal heart sounds.   Pulmonary:      Effort: Pulmonary effort is normal. No respiratory distress.      Breath sounds: Normal breath sounds.      Comments: Patient is on room air.  Abdominal:      General: Bowel sounds are normal. There is no distension.      Palpations: Abdomen is soft.      Tenderness: There is no abdominal tenderness.   Musculoskeletal:         General: No swelling.      Cervical back: Neck supple.   Skin:     General: Skin is warm and dry.      Findings: No rash.   Neurological:      Mental Status: He is alert and oriented to person, place, and time.      Motor: Weakness present.      Comments: Cranial 2-12 grossly intact.  Patient moves all extremities symmetrically.   Psychiatric:         Mood and Affect: Mood normal.         Behavior: Behavior normal.         Thought Content: Thought content normal.       Results Review:  I have reviewed the labs, radiology results, and diagnostic studies.    Laboratory Data:   Results from last 7 days   Lab Units 04/27/25  0409 04/26/25  1652 04/25/25  2341   WBC 10*3/mm3 5.72 7.83 3.70   HEMOGLOBIN g/dL 15.2 14.5 14.8   HEMATOCRIT % 46.9 43.9 44.1  "  PLATELETS 10*3/mm3 73* 87* 105*        Results from last 7 days   Lab Units 04/27/25  0409 04/26/25  1652 04/25/25  2341   SODIUM mmol/L 136 136 137   POTASSIUM mmol/L 4.9 4.5 5.0   CHLORIDE mmol/L 99 100 102   CO2 mmol/L 23.0 23.0 20.0*   BUN mg/dL 15 15 18   CREATININE mg/dL 1.02 1.10 1.10   CALCIUM mg/dL 8.9 9.3 9.1   BILIRUBIN mg/dL 2.0* 1.6* 1.2   ALK PHOS U/L 63 64 90   ALT (SGPT) U/L 26 25 19   AST (SGOT) U/L 42* 31 18   GLUCOSE mg/dL 177* 164* 209*       Culture Data:   No results found for: \"BLOODCX\", \"URINECX\", \"WOUNDCX\", \"MRSACX\", \"RESPCX\", \"STOOLCX\"    Radiology Data:   Imaging Results (Last 24 Hours)       Procedure Component Value Units Date/Time    CT Head Without Contrast [[id number]] Collected: 04/26/25 1658     Updated: 04/26/25 1703    Narrative:      EXAMINATION: CT HEAD WO CONTRAST-  4/26/2025 4:58 PM     HISTORY: seizures     COMPARISON: 9/21/2023     DLP: 1069.19 mGy.cm     In order to have a CT radiation dose as low as reasonably achievable,  Automated Exposure Control was utilized for adjustment of the mA and/or  KV according to patient size.     TECHNIQUE: Serial axial tomographic images of the brain were obtained  without the use of intravenous contrast.  Coronal and sagittal  reformatted images were obtained reviewed as well.     FINDINGS:  No mass effect or midline shift. No acute hemorrhage. There is mild  low-attenuation in the subcortical and periventricular white matter as  can be seen with chronic microvascular change. Vascular calcifications  in the vertebral arteries in the intracranial portions of the carotid  arteries.     Surrounding soft tissues are without acute findings. Orbits and globes  are preserved. Paranasal sinus disease with an air-fluid level in the  RIGHT maxillary sinus suggesting mild acute sinusitis. Mild scattered  mucosal thickening in the ethmoid air cells. Small mastoid effusion on  the RIGHT, unchanged from 2023 and likely chronic.          Impression:   "       1.  Mild atrophy and chronic white matter changes but no definite acute  intracranial process.     2.  Paranasal sinus disease as discussed above.     This report was signed and finalized on 4/26/2025 5:00 PM by Dr. Valentino Lebron MD.               I have reviewed the patient's current medications.     Assessment/Plan   Assessment  Active Hospital Problems    Diagnosis     **Recurrent seizures     Chronic heart failure with preserved ejection fraction (HFpEF)     Presence of CardioMEMS HF system     S/P CABG x 2     Chronic diastolic congestive heart failure with preserved ejection fraction     Atrial flutter     Class 1 obesity due to excess calories with serious comorbidity and body mass index (BMI) of 34.0 to 34.9 in adult     Diabetic peripheral neuropathy     Diabetic complication     Deviated nasal septum     Maribell bullosa     Benign non-nodular prostatic hyperplasia with lower urinary tract symptoms     Gastroesophageal reflux disease without esophagitis     Type 2 diabetes mellitus with circulatory disorder, with long-term current use of insulin      Assessment and plan .  Seizure . breakthrough seizure . Patient stated he has been taking his medication at home.  Wife stated patient was here yesterday and would value was sent home.  Patient has at least 5 episodes seizure lasting about a minute tonic-clonic type.  Lamictal . Keppra.  Seizure precaution.  Ativan as needed.  CT scan of the head- Mild atrophy and chronic white matter changes but no definite acute  intracranial process, Paranasal sinus disease with an air-fluid level in the RIGHT maxillary sinus suggesting mild acute sinusitis- Mild scattered mucosal thickening in the ethmoid air cells,  Small mastoid effusion on the RIGHT, unchanged from 2023 and likely chronic.     Acute sinusitis/fever last night.  Rocephin antibiotic for now.  White blood cells normal.  Blood culture STACY x 2.     History of migraine.  Nurtec as needed.     History  of atrial fibs/CHF/hypertension/hyperlipidemia/CAD.  Patient is currently in sinus rhythm.  Eliquis . Aspirin . Hold Zetia because we do not have it here.  Lasix as needed.  Imdur.  Toprol . Nitro as needed.  Entresto.  Coreg.  Echocardiogram 3/18/23-61 - 65%,  hypokinetic: apical inferior and apical septal and apex hypokinetic, left ventricular diastolic function is consistent with (grade II w/high LAP) pseudonormalization, Left atrial volume is mildly increased,  tricuspid regurgitation is normal (<35 mmHg), Normal size and function of the right ventricle, No significant valvular pathology,  No significant change compared to prior exam from 4/29/21.    Elevated bilirubin.  History of cholecystectomy.  Denies any abdomen pain.     Diabetes . Jardiance . Hold Glucophage for now.  Lantus 25 units twice daily.  Moderate sliding scale for now.  Hemoglobin A1c C 7.6.     Neuropathy.  Neurontin at night.     Reflux.  Protonix p.o.  Zofran as needed.     Constipation.  Colace as needed.  MiraLAX.    Buttock crack/wound.  Wound care.  Butt paste.    Onychomycosis/toes right second toe.  Patient has been to see podiatry outpatient for nail trimming and evaluation.  Lotion for now dry skin discussed with nursing.  Foot x-rays ordered by neurology.     Prostate hypertrophy.  Flomax.     History of asthma/allergic rhinitis.  Albuterol inhaler as needed.  Flonase . Guaifenesin.     Nutrition .  Diabetic/cardiac diet.     Medical Decision Making  Number and Complexity of problems: Seizures/sinusitis/migraine/history of atrial fibs/CAD/CHF  Differential Diagnosis: None     Conditions and Status        Condition is unchanged.     [facility] Data  External documents reviewed: Previous note .  Cardiac tracing (EKG, telemetry) interpretation: Sinus .  Radiology interpretation: CT scan  Labs reviewed: Laboratory .  Any tests that were considered but not ordered: Laboratory in a.m.     Decision rules/scores evaluated (example [id number],  Marvin, etc): None.     Discussed with: Patient and wife     Care Planning  Shared decision making: Patient and wife  Code status and discussions: Full code     Disposition  Social Determinants of Health that impact treatment or disposition: From home  Estimated length of stay is 1 to 3 days.          Electronically signed by Vish Latham MD, 25, 08:17 CDT.    Electronically signed by Vish Latham MD at 25 0924          Consult Notes (last 48 hours)        Usha Hammer APRN at 25 0918        Consult Orders    1. Inpatient Neurology Consult General [[id number]] ordered by Vish Latham MD at 25 1721              Attestation signed by Tin Santizo MD at 25 1443      EEG will be done today.  The MRI of the brain will be done tomorrow as it seems that his device is MRI compatible.  Patient is currently on a combination of Keppra and lamotrigine.  He seems to be compliant with his medications.  He was febrile yesterday.  Temperature is better.  He will be followed up by Dr. Kruger, inpatient neurohospitalist tomorrow.I independently evaluated and examined the patient along with my clinical nurse practitioner TEENA Merida using the dedicated telemedicine device without any interruption with the patient located at the [facility] and myself at a remote location and I have reviewed this documentation and agree.                        Neurology Consult Note    Consult Date: 2025  Referring MD: Vish Latham MD  Reason for Consult: breakthrough seizure    Patient: Carlos A Boyer Jr. (65 y.o. male)  MRN: [id number]  : 1960    History of Present Illness:   Carlos A Boyer Jr. is a 65 y.o. male with PMH PAF on Eliquis, cardiac stent, carotid disease, CHF, seizure disorder, HOA with CPAP, aortic aneurysm, migraines. Patient recently saw Dr. Tang and CTA chest  showed metallic object in left pulmonary artery with no recent medical  procedure and object was seen on CT scans 7/18/2023 and no seen on previous CTA 4/27/2023. . Seizure and migraines followed by REJI DICKINSON last seen on 4/9/2025.    IN CHART REVIEW, PATIENT HAD CARDIOMEMS PLACED 5/2023. WONDERING IF THIS IS THE METALIC ITEM SEEN IN CHEST.  Patient reports recent tick bite on 4/20/2025. Patient denies recent illness. His last breakthrough seizure was more than 3 years ago. He denies missing doses of Keppra 1500 mg BID and lamictal 200mg BID. Patient also takes Gabapentin 300 mg at HS.   Patient presented to [facility] ED on 4/25/2025 with prolonged seizure like activity lasting about 25-30 minutes. Episode consistent with previous seizures of full body shaking and garbled speech and confusion with no tongue biting or incontinence. He was evaluated and d/c home. The next day patient alerted his wife he needed help and he was found on the floor, very weak and confused. He then presented to ED again on 4/26. He was loaded with Keppra 1000 mg IV. Patient admitted for evaluation. He did spike fever 101.5. wife states he has done this with seizure in the past. Blood cultures pending. He has been afebrile since. He does c/o mild HA. Denies light sensitivity and no nuchal rigidity. He is somewhat disoriented this AM thinking it was Wednesday and at shift change wife reported it was night time even though the sun had risen .platelet count 87317 and bilirubin 2.0.     Patient has chronic wound to middle toe with redness radiating up foot and per wife the redness is new and he was scheduled to see wound care.       Medical History:   Past Medical/Surgical Hx:  Past Medical History:   Diagnosis Date    Arthritis     Asthma     Cancer     skin    Carotid disease, bilateral     Cellulitis     right foot - on antibiotics 6-    Chest pain     Chronic diastolic congestive heart failure 09/07/2019    Chronic sinusitis     Maribell bullosa     Coronary artery disease involving native coronary artery of  native heart with unstable angina pectoris 01/17/2017    Deviated septum     Diabetes mellitus     Difficulty urinating     Diverticulitis     Enlarged prostate     Fatty liver     GERD (gastroesophageal reflux disease)     [place] (hard of hearing)     Does have hearing aids    Hyperlipidemia LDL goal <70 02/02/2017    Hypertrophy of nasal turbinates     Keratoderma     Kidney stone     Lung nodules     lower right lung    Migraine     Murmur, heart     Myocardial infarction     Obesity     Paroxysmal atrial fibrillation 07/11/2019    Personal history of COVID-19 07/2021    PONV (postoperative nausea and vomiting)     Primary hypertension 10/16/2016    Psoriasis     Seizures     Sinus congestion     Skin cancer     Sleep apnea     not using cpap    SOB (shortness of breath)     Stroke     mild weakness on right side    UTI (urinary tract infection)      Past Surgical History:   Procedure Laterality Date    CARDIAC CATHETERIZATION  01/2016    Dr. Broadbent; widely patent previously placed stents in the left anterior descending and obstructive disease involving the diagonal branch which was treated medically    CARDIAC CATHETERIZATION N/A 07/14/2017    Procedure: Left Heart Cath;  Surgeon: Wade Ramey MD;  Location:  [facility];  Service:     CARDIAC CATHETERIZATION Left 10/15/2018    Procedure: Cardiac Catheterization/Vascular Study;  Surgeon: Wade Ramey MD;  Location:  [facility];  Service: Cardiology    CARDIAC CATHETERIZATION  10/15/2018    Procedure: Functional Flow [place];  Surgeon: Wade Ramey MD;  Location:  [facility];  Service: Cardiology    CARDIAC CATHETERIZATION N/A 10/15/2018    Procedure: Left ventriculography;  Surgeon: Wade Ramey MD;  Location:  [facility];  Service: Cardiology    CARDIAC CATHETERIZATION Left 06/26/2019    Procedure: Cardiac Catheterization/Vascular Study VEL OK  HE WILL WAIT 1 YEAR FOR SHOULDER SURGERY ;   Surgeon: Wade Ramey MD;  Location:  [facility];  Service: Cardiology    CARDIAC CATHETERIZATION Left 04/30/2021    Procedure: Coronary angiography;  Surgeon: Sahil Llamas MD;  Location:  [facility];  Service: Cardiology;  Laterality: Left;    CARDIAC CATHETERIZATION N/A 04/30/2021    Procedure: Percutaneous Coronary Intervention;  Surgeon: Sahil Llamas MD;  Location:  [facility];  Service: Cardiology;  Laterality: N/A;    CARDIAC CATHETERIZATION N/A 11/09/2022    Procedure: Left Heart Cath with SVGs;  Surgeon: Wade Ramey MD;  Location:  [facility];  Service: Cardiology;  Laterality: N/A;    CARPAL TUNNEL RELEASE      January 2024 and pther hand February 2024    CHOLECYSTECTOMY      CHOLECYSTECTOMY WITH INTRAOPERATIVE CHOLANGIOGRAM N/A 08/01/2018    Procedure: CHOLECYSTECTOMY LAPAROSCOPIC INTRAOPERATIVE CHOLANGIOGRAM;  Surgeon: Shane Ann MD;  Location: [facility] OR;  Service: General    COLONOSCOPY N/A 07/14/2020    Procedure: COLONOSCOPY WITH ANESTHESIA;  Surgeon: Anupam Morales DO;  Location: [facility] ENDOSCOPY;  Service: Gastroenterology;  Laterality: N/A;  pre: abdominal pain  post: diverticulosis  Vinayak Correia PA    CORONARY ANGIOPLASTY      CORONARY ARTERY BYPASS GRAFT N/A 07/06/2019    Procedure: CABG X2 WITH LIMA, LEFT LEG OVH, AND PLACEMENT OF LEFT FEMORAL ARTERIAL LINE;  Surgeon: Steven Tang MD;  Location: [facility] OR;  Service: Cardiothoracic    CORONARY STENT PLACEMENT      x 6    CYSTOSCOPY TRANSURETHRAL RESECTION OF PROSTATE N/A 01/23/2023    Procedure: CYSTOSCOPY TRANSURETHRAL RESECTION OF PROSTATE;  Surgeon: Latrell Pope MD;  Location: [facility] OR;  Service: Urology;  Laterality: N/A;    ENDOSCOPIC FUNCTIONAL SINUS SURGERY (FESS) Bilateral 12/13/2017    Procedure: PROCEDURE PERFORMED:  Bilateral functional endoscopic anterior ethmoidectomy with bilateral middle meatal antrostomy Septoplasty  Right kathia bullosa resection Bilateral inferior turbinate reduction via Cob[place];  Surgeon: Mayank Ibarra MD;  Location:  [facility] OR;  Service:     ENDOSCOPY N/A 07/30/2018    Procedure: ESOPHAGOGASTRODUODENOSCOPY WITH ANESTHESIA;  Surgeon: Benitez Mas MD;  Location:  [facility] ENDOSCOPY;  Service: Gastroenterology    ENDOSCOPY N/A 07/14/2020    Procedure: ESOPHAGOGASTRODUODENOSCOPY WITH ANESTHESIA;  Surgeon: Anupam Morales DO;  Location: [facility] ENDOSCOPY;  Service: Gastroenterology;  Laterality: N/A;  pre: abdominal pain  post: esophagitis  Vinayak Correia PA    HERNIA REPAIR      x2 inguinal area    KIDNEY STONE SURGERY      KNEE ARTHROSCOPY Right 03/01/2022    Procedure: RIGHT KNEE PARTIAL LATERAL MENISCECTOMY;  Surgeon: Pedro Pablo Song MD;  Location: [facility] OR;  Service: Orthopedics;  Laterality: Right;    KNEE SURGERY Right     OTHER SURGICAL HISTORY      urolift    PROSTATE SURGERY      Dr. Badillo - 2017    PULMONARY ARTERY PRESSURE SENSOR IMPLANT N/A 05/08/2023    Procedure: PA Pressure Sensor Implant;  Surgeon: Wade Ramey MD;  Location: [facility] CATH INVASIVE LOCATION;  Service: Cardiology;  Laterality: N/A;    ROTATOR CUFF REPAIR Right     SLEEP ENDOSCOPY N/A 02/27/2023    Procedure: Videosleep endoscopy;  Surgeon: Mayank Ibarra MD;  Location: [facility] OR;  Service: ENT;  Laterality: N/A;    THUMB AMPUTATION Left     partial    TOE FUSION Right 7/3/2024    Procedure: Hallux Interphalangeal Joint Arthrodesis, Bone Graft [place] - Right Foot;  Surgeon: Hernan Villa DPM;  Location:  [facility] OR;  Service: Podiatry;  Laterality: Right;    TOE SURGERY      great toe       Medications On Admission:  Medications Prior to Admission   Medication Sig Dispense Refill Last Dose/Taking    acetaminophen (TYLENOL) 325 MG tablet Take 1 tablet by mouth Every 6 (Six) Hours As Needed for Mild Pain.       albuterol (PROVENTIL HFA;VENTOLIN HFA) 108 (90 BASE) MCG/ACT inhaler Inhale 2 puffs Every  6 (Six) Hours As Needed for Wheezing.       apixaban (ELIQUIS) 5 MG tablet tablet Take 1 tablet by mouth 2 (Two) Times a Day.       Aspirin 81 MG capsule Take 81 mg by mouth Daily.       calcium polycarbophil (FIBERCON) 625 MG tablet Take 1 tablet by mouth Daily.       carboxymethylcellulose (REFRESH PLUS) 0.5 % solution Administer 1 drop to both eyes 4 (Four) Times a Day As Needed for Dry Eyes.       docusate sodium (COLACE) 100 MG capsule Take 1 capsule by mouth Daily As Needed for Constipation.       empagliflozin (JARDIANCE) 25 MG tablet tablet Take 1 tablet by mouth Daily.       ezetimibe (ZETIA) 10 MG tablet Take 1 tablet by mouth Daily.       fluticasone (FLONASE) 50 MCG/ACT nasal spray Administer 2 sprays into the nostril(s) as directed by provider Daily.       furosemide (Lasix) 40 MG tablet Take 1 tablet by mouth Daily As Needed (take as needed for weight gain more than 2 pounds in a day or more than than 3 pounds in 2 days.). 90 tablet 3     gabapentin (NEURONTIN) 300 MG capsule Take 1 capsule by mouth Every Night. (Patient taking differently: Take 1 capsule by mouth 2 (Two) Times a Day.)       guaiFENesin (MUCINEX) 600 MG 12 hr tablet Take 2 tablets by mouth Daily As Needed for Congestion.       HYDROcodone Bit-Homatrop MBr (HYCODAN) 5-1.5 MG/5ML solution Take 5 mL by mouth Every 4 (Four) Hours As Needed for Cough. (Patient not taking: Reported on 4/9/2025) 120 mL 0     insulin aspart (novoLOG FLEXPEN) 100 UNIT/ML solution pen-injector sc pen Inject 50 Units under the skin into the appropriate area as directed 3 (Three) Times a Day With Meals. Can use 5 to 6 more units if needed in early evening.       INSULIN GLARGINE-YFGN SC Inject 100 Units under the skin into the appropriate area as directed 2 (Two) Times a Day.       isosorbide mononitrate (IMDUR) 120 MG 24 hr tablet Take 1 tablet by mouth Daily. 90 tablet 3     lamoTRIgine (LaMICtal) 200 MG tablet Take 1 tablet by mouth 2 (Two) Times a Day. 180  tablet 3     levETIRAcetam (KEPPRA) 500 MG tablet Take 3 tablets by mouth Every Morning. 270 tablet 3     levETIRAcetam (KEPPRA) 500 MG tablet Take 4 tablets by mouth Every Night. 360 tablet 3     metFORMIN (GLUCOPHAGE) 1000 MG tablet Take 1 tablet by mouth 2 (Two) Times a Day With Meals.       methylPREDNISolone (MEDROL) 4 MG dose pack Take as directed on package instructions. (Patient not taking: Reported on 4/9/2025) 21 tablet 0     metoprolol tartrate (LOPRESSOR) 100 MG tablet Take 1 tablet by mouth 2 (Two) Times a Day.       Multiple Vitamin (MULTI VITAMIN PO) Take 1 tablet by mouth Daily.       nitroglycerin (NITROSTAT) 0.4 MG SL tablet Place 1 tablet under the tongue Every 5 (Five) Minutes As Needed for Chest Pain. Take no more than 3 doses in 15 minutes. 25 tablet 2     ondansetron ODT (ZOFRAN-ODT) 4 MG disintegrating tablet Place 1 tablet on the tongue Every 6 (Six) Hours As Needed for Nausea or Vomiting for up to 12 doses. 12 tablet 0     pantoprazole (PROTONIX) 40 MG EC tablet Take 1 tablet by mouth 2 (Two) Times a Day.       polyethylene glycol (MIRALAX) 17 g packet Take 17 g by mouth Daily.       psyllium (METAMUCIL) 58.6 % packet Take 1 packet by mouth Daily As Needed (constipation).       Rimegepant Sulfate (Nurtec) 75 MG tablet dispersible tablet Take 1 tablet by mouth 3 (Three) Times a Day As Needed (migraine).       rimegepant sulfate ODT (Nurtec) 75 MG disintegrating tablet Place 1 tablet under the tongue 1 (One) Time As Needed (at onset) for up to 180 days. 8 tablet 5     rosuvastatin (CRESTOR) 40 MG tablet Take 0.5 tablets by mouth Every Night.       sacubitril-valsartan (Entresto)  MG tablet Take 1 tablet by mouth 2 (Two) Times a Day.       Semaglutide,0.25 or 0.5MG/DOS, (Ozempic, 0.25 or 0.5 MG/DOSE,) 2 MG/1.5ML solution pen-injector Inject 0.5 mg under the skin into the appropriate area as directed 1 (One) Time Per Week. Monday       tamsulosin (FLOMAX) 0.4 MG capsule 24 hr capsule Take  1 capsule by mouth Daily.       triamcinolone (KENALOG) 0.1 % ointment Apply 1 Application topically to the appropriate area as directed 2 (Two) Times a Day. To feet          Current Medications:    Current Facility-Administered Medications:     acetaminophen (TYLENOL) tablet 325 mg, 325 mg, Oral, Q6H PRN, Vish Latham MD, 325 mg at 04/26/25 2038    albuterol (PROVENTIL) nebulizer solution 0.083% 2.5 mg/3mL, 2.5 mg, Nebulization, Q6H PRN, Vish Latham MD    apixaban (ELIQUIS) tablet 5 mg, 5 mg, Oral, BID, Vish Latham MD, 5 mg at 04/26/25 2039    aspirin EC tablet 81 mg, 81 mg, Oral, Daily, Vish Latham MD    sennosides-docusate (PERICOLACE) 8.6-50 MG per tablet 2 tablet, 2 tablet, Oral, BID PRN **AND** polyethylene glycol (MIRALAX) packet 17 g, 17 g, Oral, Daily PRN **AND** bisacodyl (DULCOLAX) EC tablet 5 mg, 5 mg, Oral, Daily PRN **AND** bisacodyl (DULCOLAX) suppository 10 mg, 10 mg, Rectal, Daily PRN, Vish Latham MD    cefTRIAXone (ROCEPHIN) 1,000 mg in sodium chloride 0.9 % 100 mL MBP, 1,000 mg, Intravenous, Q24H, Vish Latham MD, Last Rate: 200 mL/hr at 04/26/25 2040, 1,000 mg at 04/26/25 2040    dextrose (D50W) (25 g/50 mL) IV injection 25 g, 25 g, Intravenous, Q15 Min PRN, Vish Latham MD    dextrose (GLUTOSE) oral gel 15 g, 15 g, Oral, Q15 Min PRN, Vish Latham MD    docusate sodium (COLACE) capsule 100 mg, 100 mg, Oral, Daily PRN, Vish Latham MD    empagliflozin (JARDIANCE) tablet 25 mg, 25 mg, Oral, Daily, Vish Latham MD    fluticasone (FLONASE) 50 MCG/ACT nasal spray 2 spray, 2 spray, Nasal, Daily, Vish Latham MD    furosemide (LASIX) tablet 40 mg, 40 mg, Oral, Daily PRN, Vish Latham MD    gabapentin (NEURONTIN) capsule 300 mg, 300 mg, Oral, Nightly, Vish Latham MD, 300 mg at 04/26/25 2040    glucagon (GLUCAGEN) injection 1 mg, 1 mg, Intramuscular, Q15 Min PRN, Vish Latham MD    guaiFENesin (MUCINEX) 12 hr tablet 1,200 mg, 1,200 mg, Oral, Daily PRN,  Vish Latham MD    insulin glargine (LANTUS, SEMGLEE) injection 25 Units, 25 Units, Subcutaneous, Q12H, Vish Latham MD, 25 Units at 04/26/25 2041    Insulin Lispro (humaLOG) injection 2-9 Units, 2-9 Units, Subcutaneous, 4x Daily AC & at Bedtime, Vish Latham MD, 2 Units at 04/27/25 0805    isosorbide mononitrate (IMDUR) 24 hr tablet 120 mg, 120 mg, Oral, Daily, Vish Latham MD    lamoTRIgine (LaMICtal) tablet 200 mg, 200 mg, Oral, BID, Vish Latham MD, 200 mg at 04/26/25 2039    levETIRAcetam (KEPPRA) tablet 1,500 mg, 1,500 mg, Oral, Daily, Vish Latham MD    levETIRAcetam (KEPPRA) tablet 2,000 mg, 2,000 mg, Oral, Nightly, Vish Latham MD, 2,000 mg at 04/26/25 2039    LORazepam (ATIVAN) injection 1 mg, 1 mg, Intravenous, Q2H PRN, Vish Latham MD    metoprolol tartrate (LOPRESSOR) tablet 100 mg, 100 mg, Oral, BID, Vish Latham MD, 100 mg at 04/26/25 2039    nitroglycerin (NITROSTAT) SL tablet 0.4 mg, 0.4 mg, Sublingual, Q5 Min PRN, Vish Latham MD    ondansetron (ZOFRAN) injection 4 mg, 4 mg, Intravenous, Q6H PRN, Vish Latham MD    pantoprazole (PROTONIX) EC tablet 40 mg, 40 mg, Oral, BID, Vish Latham MD, 40 mg at 04/26/25 2039    polyethylene glycol (MIRALAX) packet 17 g, 17 g, Oral, Daily, Vish Latham MD, 17 g at 04/26/25 2038    rosuvastatin (CRESTOR) tablet 20 mg, 20 mg, Oral, Nightly, Vish Latham MD, 20 mg at 04/26/25 2039    sacubitril-valsartan (ENTRESTO)  MG tablet 1 tablet, 1 tablet, Oral, BID, Vish Latham MD, 1 tablet at 04/26/25 2039    sodium chloride 0.9 % flush 10 mL, 10 mL, Intravenous, Q12H, Vish Latham MD, 10 mL at 04/26/25 2103    sodium chloride 0.9 % flush 10 mL, 10 mL, Intravenous, PRN, Vish Latham MD    sodium chloride 0.9 % infusion 40 mL, 40 mL, Intravenous, PRN, Vish Latham MD    tamsulosin (FLOMAX) 24 hr capsule 0.4 mg, 0.4 mg, Oral, Daily, Vish Latham MD     Allergies:  Allergies   Allergen Reactions    Flagyl  [Metronidazole] Hives    Atorvastatin Other (See Comments) and Myalgia      - LIPITOR -   Muscle cramps    Ciprofloxacin Hives      - CIPRO -        Social Hx:  Social History     Socioeconomic History    Marital status:    Tobacco Use    Smoking status: Former     Current packs/day: 0.00     Average packs/day: 1.5 packs/day for 18.0 years (27.0 ttl pk-yrs)     Types: Cigarettes, Cigars     Start date:      Quit date:      Years since quittin.3     Passive exposure: Past    Smokeless tobacco: Former     Types: Chew     Quit date:    Vaping Use    Vaping status: Never Used   Substance and Sexual Activity    Alcohol use: No     Comment: 0    Drug use: No    Sexual activity: Defer       Family Hx:  Family History   Problem Relation Age of Onset    Heart disease Father     COPD Mother     Hypertension Mother     Asthma Mother     No Known Problems Sister     Colon cancer Paternal Uncle     Prostate cancer Maternal Grandfather     No Known Problems Sister     Colon cancer Maternal Grandmother     Colon polyps Maternal Grandmother      Physical Examination:   Vital Signs:  Vitals:    25 1839 25 2331 25 0430 25 0830   BP: 150/74 108/56 141/75 129/68   BP Location: Left arm Left arm Left arm Left arm   Patient Position: Lying Lying Lying Sitting   Pulse: 87 69 80 72   Resp: 24 18 18 18   Temp: (!) 101.5 °F (38.6 °C) 98.2 °F (36.8 °C) 98.3 °F (36.8 °C) 99.1 °F (37.3 °C)   TempSrc: Oral Oral Oral Oral   SpO2: 98% 93% 97% 92%   Weight:   111 kg (244 lb 4.3 oz)    Height:             General Exam:  Head:  Normocephalic, atraumatic  HEENT:  Neck supple  CVS:  Regular rate and rhythm.  No murmurs  Carotid Examination:  No bruits  Lungs:  Clear to auscultation  Abdomen:  Nontender, Nondistended  Extremities:  No signs of peripheral edema. Extremely dry skin 3rd right toe with necrosis with redness on toe radiating up foot.   Skin:  No rashes    Neurologic Exam:    Mental Status:     -Awake, Alert, Oriented X 3  -No word finding difficulties  -No aphasia  -No dysarthria  -Follows simple and complex commands    CN II:  Visual fields full.  Pupils equally reactive to light  CN III, IV, VI:  Extraocular Muscles full with no signs of nystagmus  CN V:  Facial sensory is symmetric with no asymmetries.  CN VII:  Facial motor symmetric  CN VIII:  Gross hearing intact bilaterally  CN IX:  Palate elevates symmetrically  CN X:  Palate elevates symmetrically  CN XI:  Shoulder shrug symmetric  CN XII:  Tongue is midline on protrusion    Motor: (strength out of 5:  1= minimal movement, 2 = movement in plane of gravity, 3 = movement against gravity, 4 = movement against some resistance, 5 = full strength)    -Right Upper Ext: Proximal: 5 Distal: 5  -Left Upper Ext: Proximal: 5 Distal: 5    -Right Lower Ext: Proximal: 5 Distal: 5  -Left Lower Ext: Proximal: 5 Distal: 5    DTR:  -Right   Biceps: 2+ Triceps: 2+ Brachioradialis: 2+   Patella: 2+ Ankle: 2+ Neg Babinski  -Left   Biceps: 2+ Triceps: 2+ Brachioradialis: 2+   Patella: 2+ Ankle: 2+ Neg Babinski    Sensory:  -Intact to light touch, pinprick, temperature, pain, and proprioception    Coordination:  -Finger to nose intact  -Heel to shin intact  -No ataxia    Gait  -not tested.     Recent Diagnostics:   Laboratory Results:   - Reviewed in EMR  Lab Results   Component Value Date    GLUCOSE 177 (H) 04/27/2025    CALCIUM 8.9 04/27/2025     04/27/2025    K 4.9 04/27/2025    CO2 23.0 04/27/2025    CL 99 04/27/2025    BUN 15 04/27/2025    CREATININE 1.02 04/27/2025    EGFRIFAFRI 27.5 (L) 08/25/2023    EGFRIFNONA 22.7 (L) 08/25/2023    BCR 14.7 04/27/2025    ANIONGAP 14.0 04/27/2025     Lab Results   Component Value Date    WBC 5.72 04/27/2025    HGB 15.2 04/27/2025    HCT 46.9 04/27/2025    MCV 93.2 04/27/2025    PLT 73 (L) 04/27/2025     Lab Results   Component Value Date    PTT 30.5 09/18/2024    INR 1.00 09/18/2024     Lab Results   Component Value Date     TRIG 115 04/27/2025    HDL 30 (L) 04/27/2025    LDL 46 04/27/2025     Lab Results   Component Value Date    HGBA1C 7.60 (H) 04/27/2025       Imaging Results:  Imaging Results (Last 24 Hours)       Procedure Component Value Units Date/Time    CT Head Without Contrast [[id number]] Collected: 04/26/25 1658     Updated: 04/26/25 1703    Narrative:      EXAMINATION: CT HEAD WO CONTRAST-  4/26/2025 4:58 PM     HISTORY: seizures     COMPARISON: 9/21/2023     DLP: 1069.19 mGy.cm     In order to have a CT radiation dose as low as reasonably achievable,  Automated Exposure Control was utilized for adjustment of the mA and/or  KV according to patient size.     TECHNIQUE: Serial axial tomographic images of the brain were obtained  without the use of intravenous contrast.  Coronal and sagittal  reformatted images were obtained reviewed as well.     FINDINGS:  No mass effect or midline shift. No acute hemorrhage. There is mild  low-attenuation in the subcortical and periventricular white matter as  can be seen with chronic microvascular change. Vascular calcifications  in the vertebral arteries in the intracranial portions of the carotid  arteries.     Surrounding soft tissues are without acute findings. Orbits and globes  are preserved. Paranasal sinus disease with an air-fluid level in the  RIGHT maxillary sinus suggesting mild acute sinusitis. Mild scattered  mucosal thickening in the ethmoid air cells. Small mastoid effusion on  the RIGHT, unchanged from 2023 and likely chronic.          Impression:         1.  Mild atrophy and chronic white matter changes but no definite acute  intracranial process.     2.  Paranasal sinus disease as discussed above.     This report was signed and finalized on 4/26/2025 5:00 PM by Dr. Valentino Lebron MD.                      Assessment & Plan:   Patient with PMH PAF on Eliquis, cardiac stent, carotid disease, CHF, seizure disorder, HOA with CPAP, aortic aneurysm, migraines. Patient  recently saw Dr. Tang and CTA chest 4/9 showed metallic object in left pulmonary artery with no recent medical procedure and object was seen on CT scans 7/18/2023 and no seen on previous CTA 4/27/2023. . Seizure and migraines followed by REJI DICKINSON last seen on 4/9/2025.    Patient reports recent tick bite on 4/20/2025. Patient denies recent illness. His last breakthrough seizure was more than 3 years ago. He denies missing doses of Keppra 1500 mg BID and lamictal 200mg BID. Patient also takes Gabapentin 300 mg at HS.   Patient presented to [facility] ED on 4/25 and 4/26 with recurrent breakthroug seizure and treated as above.     Active Hospital Problems    Diagnosis  POA    **Recurrent seizures [G40.909]  Yes    Chronic heart failure with preserved ejection fraction (HFpEF) [I50.32]  Yes    Presence of CardioMEMS HF system [Z95.818]  Yes    S/P CABG x 2 [Z95.1]  Not Applicable     WHITLOCK-LAD, VG-OM '19 ([place])      Chronic diastolic congestive heart failure with preserved ejection fraction [I50.32]  Yes    Atrial flutter [I48.92]  Yes    Class 1 obesity due to excess calories with serious comorbidity and body mass index (BMI) of 34.0 to 34.9 in adult [E66.811, E66.09, Z68.34]  Not Applicable    Diabetic peripheral neuropathy [E11.42]  Yes    Diabetic complication [E11.8]  Yes    Deviated nasal septum [J34.2]  Yes    Maribell bullosa [J34.89]  Yes    Benign non-nodular prostatic hyperplasia with lower urinary tract symptoms [N40.1]  Yes     Added automatically from request for surgery [id number]      Gastroesophageal reflux disease without esophagitis [K21.9]  Yes    Type 2 diabetes mellitus with circulatory disorder, with long-term current use of insulin [E11.59, Z79.4]  Not Applicable      Impression:  Breakthroug seizure  FUO  Chronic appearing wound 3rd right toe with redness.  PAF on chronic anticoagulation.  HOA with PAP  Ascending aortic aneurysm   CHF with Presence of CARDIOMEMS placed 5/2023.   CTA chest 4/9 showed  metallic object in left pulmonary artery with no recent medical procedure and object was seen on CT scans 7/18/2023 and no seen on previous CTA 4/27/2023.   Thrombocytopenia  PAF on chronic anticoagulation      Plan:   Continue home Keppra and lamictal  Keppra and lamictal level pending  Blood culture pending  Tick panel, alpha gal, lyme   Xray right foot.  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 And would need to follow MRI protocal.  Discussed care with Dr. Latham.   I did speak to Dr. BERENICE Durán, and he explained that MRI would be capable with MRI conditional and per MRI protocol.     Usha Hammer, APRN  04/27/25  09:19 CDT    Medical Decision Making    Number/Complexity of Problems  Moderate  1 undiagnosed new problem with uncertain prognosis -   1 acute illness with systemic symptoms -   High  1 acute or chronic illness that poses a threat to life/body function -   high     MDM Data  Moderate - 1/3 categories  Extensive - 2/3 categories    Category 1: 3 of the following  Review of external notes  Review of results  Ordering of each unique test  Independent historian  Category 2:  Independent interpretation of test (ex: imaging)  Category 3:  Discussion of management with another provider    extensive     Treatment Plan  Moderate - Prescription Drug management  High  Drug therapy requiring intensive monitoring for toxicity  Decision regarding hospitalization or escalation of care  De-escalate care/DNR decisions  high        Electronically signed by Tin Santizo MD at 04/27/25 2449

## 2025-04-28 NOTE — THERAPY DISCHARGE NOTE
Patient Name: Carlos A Boyer Jr.  : 1960    MRN: [id number]                              Today's Date: 2025       Admit Date: 2025    Visit Dx:     ICD-10-CM ICD-9-CM   1. Recurrent seizures  G40.909 345.80     Patient Active Problem List   Diagnosis    Type 2 diabetes mellitus with circulatory disorder, with long-term current use of insulin    Gastroesophageal reflux disease without esophagitis    Primary hypertension    Seizure disorder    Carotid disease, bilateral    Coronary artery disease involving native coronary artery of native heart without angina pectoris    Hyperlipidemia LDL goal <70    Chronic left arterial ischemic stroke, ICA (internal carotid artery)    HOA on CPAP    Benign non-nodular prostatic hyperplasia with lower urinary tract symptoms    Deviated nasal septum    Maribell bullosa    Hypertrophy of both inferior nasal turbinates    Chronic sinusitis of both maxillary sinuses    S/P FESS (functional endoscopic sinus surgery)    Diabetic complication    Epigastric pain    Diabetic peripheral neuropathy    Class 1 obesity due to excess calories with serious comorbidity and body mass index (BMI) of 34.0 to 34.9 in adult    Aspirin-like platelet function defect    History of seizure    Lung nodules    Cardiac murmur    Thoracic aortic aneurysm without rupture    Paroxysmal atrial fibrillation    Atrial flutter    Chronic diastolic congestive heart failure with preserved ejection fraction    Fluid overload    Bilateral pleural effusion    Respiratory distress    Long-term use of high-risk medication    Dysphagia    Left lower quadrant abdominal pain    COVID-19 virus detected    Shortness of breath    S/P CABG x 2    Chronic anticoagulation    Old complex tear of lateral meniscus of right knee    BPH with obstruction/lower urinary tract symptoms    Daytime somnolence    Snoring    Aneurysm of ascending aorta without rupture    Presence of CardioMEMS HF system    Chest pain     Non-pressure chronic ulcer of other part of right foot with fat layer exposed    Diabetic ulcer of toe of right foot associated with type 2 diabetes mellitus, with fat layer exposed    Deformity, toe acquired, right    Cellulitis of right toe    Chronic heart failure with preserved ejection fraction (HFpEF)    Recurrent seizures     Past Medical History:   Diagnosis Date    Arthritis     Asthma     Cancer     skin    Carotid disease, bilateral     Cellulitis     right foot - on antibiotics 6-    Chest pain     Chronic diastolic congestive heart failure 09/07/2019    Chronic sinusitis     Maribell bullosa     Coronary artery disease involving native coronary artery of native heart with unstable angina pectoris 01/17/2017    Deviated septum     Diabetes mellitus     Difficulty urinating     Diverticulitis     Enlarged prostate     Fatty liver     GERD (gastroesophageal reflux disease)     [place] (hard of hearing)     Does have hearing aids    Hyperlipidemia LDL goal <70 02/02/2017    Hypertrophy of nasal turbinates     Keratoderma     Kidney stone     Lung nodules     lower right lung    Migraine     Murmur, heart     Myocardial infarction     Obesity     Paroxysmal atrial fibrillation 07/11/2019    Personal history of COVID-19 07/2021    PONV (postoperative nausea and vomiting)     Primary hypertension 10/16/2016    Psoriasis     Seizures     Sinus congestion     Skin cancer     Sleep apnea     not using cpap    SOB (shortness of breath)     Stroke     mild weakness on right side    UTI (urinary tract infection)      Past Surgical History:   Procedure Laterality Date    CARDIAC CATHETERIZATION  01/2016    Dr. Broadbent; widely patent previously placed stents in the left anterior descending and obstructive disease involving the diagonal branch which was treated medically    CARDIAC CATHETERIZATION N/A 07/14/2017    Procedure: Left Heart Cath;  Surgeon: Wade Ramey MD;  Location:  [facility];   Service:     CARDIAC CATHETERIZATION Left 10/15/2018    Procedure: Cardiac Catheterization/Vascular Study;  Surgeon: Wade Ramey MD;  Location:  [facility];  Service: Cardiology    CARDIAC CATHETERIZATION  10/15/2018    Procedure: Functional Flow [place];  Surgeon: Wade Ramey MD;  Location:  [facility];  Service: Cardiology    CARDIAC CATHETERIZATION N/A 10/15/2018    Procedure: Left ventriculography;  Surgeon: Wade Ramey MD;  Location:  [facility];  Service: Cardiology    CARDIAC CATHETERIZATION Left 06/26/2019    Procedure: Cardiac Catheterization/Vascular Study VEL OK  HE WILL WAIT 1 YEAR FOR SHOULDER SURGERY ;  Surgeon: Wade Ramey MD;  Location:  [facility];  Service: Cardiology    CARDIAC CATHETERIZATION Left 04/30/2021    Procedure: Coronary angiography;  Surgeon: Sahil Llamas MD;  Location:  [facility];  Service: Cardiology;  Laterality: Left;    CARDIAC CATHETERIZATION N/A 04/30/2021    Procedure: Percutaneous Coronary Intervention;  Surgeon: Sahil Llamas MD;  Location:  [facility];  Service: Cardiology;  Laterality: N/A;    CARDIAC CATHETERIZATION N/A 11/09/2022    Procedure: Left Heart Cath with SVGs;  Surgeon: Wade Ramey MD;  Location:  [facility];  Service: Cardiology;  Laterality: N/A;    CARPAL TUNNEL RELEASE      January 2024 and pther hand February 2024    CHOLECYSTECTOMY      CHOLECYSTECTOMY WITH INTRAOPERATIVE CHOLANGIOGRAM N/A 08/01/2018    Procedure: CHOLECYSTECTOMY LAPAROSCOPIC INTRAOPERATIVE CHOLANGIOGRAM;  Surgeon: Shane Ann MD;  Location: [facility] OR;  Service: General    COLONOSCOPY N/A 07/14/2020    Procedure: COLONOSCOPY WITH ANESTHESIA;  Surgeon: Anupam Morales DO;  Location: [facility] ENDOSCOPY;  Service: Gastroenterology;  Laterality: N/A;  pre: abdominal pain  post: diverticulosis  Vinayak Correia PA    CORONARY ANGIOPLASTY       CORONARY ARTERY BYPASS GRAFT N/A 07/06/2019    Procedure: CABG X2 WITH LIMA, LEFT LEG OVH, AND PLACEMENT OF LEFT FEMORAL ARTERIAL LINE;  Surgeon: Steven Tang MD;  Location:  [facility] OR;  Service: Cardiothoracic    CORONARY STENT PLACEMENT      x 6    CYSTOSCOPY TRANSURETHRAL RESECTION OF PROSTATE N/A 01/23/2023    Procedure: CYSTOSCOPY TRANSURETHRAL RESECTION OF PROSTATE;  Surgeon: Latrell Pope MD;  Location:  [facility] OR;  Service: Urology;  Laterality: N/A;    ENDOSCOPIC FUNCTIONAL SINUS SURGERY (FESS) Bilateral 12/13/2017    Procedure: PROCEDURE PERFORMED:  Bilateral functional endoscopic anterior ethmoidectomy with bilateral middle meatal antrostomy Septoplasty Right kathia bullosa resection Bilateral inferior turbinate reduction via Coblation;  Surgeon: Mayank Ibarra MD;  Location:  [facility] OR;  Service:     ENDOSCOPY N/A 07/30/2018    Procedure: ESOPHAGOGASTRODUODENOSCOPY WITH ANESTHESIA;  Surgeon: Benitez Mas MD;  Location: [facility] ENDOSCOPY;  Service: Gastroenterology    ENDOSCOPY N/A 07/14/2020    Procedure: ESOPHAGOGASTRODUODENOSCOPY WITH ANESTHESIA;  Surgeon: Anupam Morales DO;  Location:  [facility] ENDOS[facility];  Service: Gastroenterology;  Laterality: N/A;  pre: abdominal pain  post: esophagitis  Vinayak Correia, PA    HERNIA REPAIR      x2 inguinal area    KIDNEY STONE SURGERY      KNEE ARTHROSCOPY Right 03/01/2022    Procedure: RIGHT KNEE PARTIAL LATERAL MENISCECTOMY;  Surgeon: Pedro Pablo Song MD;  Location:  [facility] OR;  Service: Orthopedics;  Laterality: Right;    KNEE SURGERY Right     OTHER SURGICAL HISTORY      urolift    PROSTATE SURGERY      Dr. Badillo - 2017    PULMONARY ARTERY PRESSURE SENSOR IMPLANT N/A 05/08/2023    Procedure: PA Pressure Sensor Implant;  Surgeon: Wade Ramey MD;  Location: [facility] CATH INVASIVE LOCATION;  Service: Cardiology;  Laterality: N/A;    ROTATOR CUFF REPAIR Right     SLEEP ENDOSCOPY N/A 02/27/2023    Procedure: Videosleep endoscopy;   Surgeon: Mayank Ibarra MD;  Location:  [facility] OR;  Service: ENT;  Laterality: N/A;    THUMB AMPUTATION Left     partial    TOE FUSION Right 7/3/2024    Procedure: Hallux Interphalangeal Joint Arthrodesis, Bone Graft [place] - Right Foot;  Surgeon: Hernan Villa DPM;  Location:  [facility] OR;  Service: Podiatry;  Laterality: Right;    TOE SURGERY      great toe      General Information       Row Name 04/28/25 0930          Physical Therapy Time and Intention    Document Type evaluation  cc: Breakthrough seizure. Dx: Recurrent seizures. H/o CVA, HFpEF, s/p CABG x2  -MS     Mode of Treatment physical therapy;individual therapy  -MS       Row Name 04/28/25 0930          General Information    Patient Profile Reviewed yes  -MS     Prior Level of Function independent:;all household mobility;community mobility;ADL's;driving;work  -MS     Existing Precautions/Restrictions seizures  -MS       Row Name 04/28/25 0930          Living Environment    Current Living Arrangements home  -MS     People in Home spouse  -MS       Row Name 04/28/25 0930          Home Main Entrance    Number of Stairs, Main Entrance five  -MS     Stair Railings, Main Entrance railings on both sides of stairs  -MS       Row Name 04/28/25 0930          Stairs Within Home, Primary    Number of Stairs, Within Home, Primary none  -MS       Row Name 04/28/25 0930          Cognition    Orientation Status (Cognition) oriented x 4  -MS               User Key  (r) = Recorded By, (t) = Taken By, (c) = Cosigned By      Initials Name Provider Type    MS Nidia Fulton R, PT, DPT, NCS Physical Therapist                   Mobility       Row Name 04/28/25 0930          Bed Mobility    Comment, (Bed Mobility) pt sitting EOB  -MS       Row Name 04/28/25 0930          Sit-Stand Transfer    Sit-Stand [place] (Transfers) independent  -MS       Row Name 04/28/25 0930          Gait/Stairs (Locomotion)    [place] Level (Gait) independent  -MS     Distance in  Feet (Gait) 200  -MS     Comment, (Gait/Stairs) pt able to ambulate forward, backward, and side to side  -MS               User Key  (r) = Recorded By, (t) = Taken By, (c) = Cosigned By      Initials Name Provider Type    MS Fulton Nidia INDU, PT, DPT, NCS Physical Therapist                   Obj/Interventions       Row Name 04/28/25 0930          Range of Motion Comprehensive    General Range of Motion bilateral upper extremity ROM WFL;bilateral lower extremity ROM WFL  -MS       Row Name 04/28/25 0930          Strength Comprehensive (MMT)    General Manual Muscle Testing (MMT) Assessment no strength deficits identified  -MS       Row Name 04/28/25 0930          Balance    Balance Assessment sitting static balance;sitting dynamic balance;standing static balance;standing dynamic balance  -MS     Static Sitting Balance independent  -MS     Dynamic Sitting Balance independent  -MS     Position, Sitting Balance unsupported;sitting edge of bed  -MS     Static Standing Balance independent  -MS     Dynamic Standing Balance independent  -MS     Position/Device Used, Standing Balance unsupported  -MS       Row Name 04/28/25 0930          Sensory Assessment (Somatosensory)    Sensory Assessment (Somatosensory) --  B feet peripheral neuropathy  -MS               User Key  (r) = Recorded By, (t) = Taken By, (c) = Cosigned By      Initials Name Provider Type    MS Nidia Fulton, PT, DPT, NCS Physical Therapist                   Goals/Plan    No documentation.                  Clinical Impression       Row Name 04/28/25 0930          Pain    Pretreatment Pain Rating 0/10 - no pain  -MS     Posttreatment Pain Rating 0/10 - no pain  -MS       Row Name 04/28/25 0930          Plan of Care Review    Plan of Care Reviewed With patient  -MS     Progress improving  -MS     Outcome Evaluation The patient presents alert and oriented x4 sitting EOB with no complaints. He typically lives at home with his spouse and is independent in all  functional mobility including yard work. Today he demonstrates no focal deficits in strength, sensation, or coordination other than his chronic peripheral neuropathy. He was able to safely ambulate in the hallway. He has no further needs for PT at this time. PT to sign off. Recommend discharge home with assist.  -MS       Row Name 04/28/25 0930          Therapy Assessment/Plan (PT)    Criteria for Skilled Interventions Met (PT) no problems identified which require skilled intervention;does not meet criteria for skilled intervention  -MS     Therapy Frequency (PT) evaluation only  -MS       Row Name 04/28/25 0930          Positioning and Restraints    Post Treatment Position bed  -MS     In Bed sitting EOB;call light within reach;encouraged to call for assist  -MS               User Key  (r) = Recorded By, (t) = Taken By, (c) = Cosigned By      Initials Name Provider Type    MS Fulton Nidia INDU, PT, DPT, NCS Physical Therapist                   Outcome Measures       Row Name 04/28/25 0930 04/28/25 0834       How much help from another person do you currently need...    Turning from your back to your side while in flat bed without using bedrails? 4  -MS 4  -DS    Moving from lying on back to sitting on the side of a flat bed without bedrails? 4  -MS 4  -DS    Moving to and from a bed to a chair (including a wheelchair)? 4  -MS 4  -DS    Standing up from a chair using your arms (e.g., wheelchair, bedside chair)? 4  -MS 4  -DS    Climbing 3-5 steps with a railing? 4  -MS 4  -DS    To walk in hospital room? 4  -MS 4  -DS    AM-PAC 6 Clicks Score (PT) 24  -MS 24  -DS    Highest Level of Mobility Goal 8 --> Walked 250 feet or more  -MS 8 --> Walked 250 feet or more  -DS      Row Name 04/28/25 0930          Functional Assessment    Outcome Measure Options AM-PAC 6 Clicks Basic Mobility (PT)  -MS               User Key  (r) = Recorded By, (t) = Taken By, (c) = Cosigned By      Initials Name Provider Type    MS Fulton  Nidia GRIGGS PT, DPT, NCS Physical Therapist    Mouna Storey, RN Registered Nurse                    PT Recommendation and Plan     Progress: improving  Outcome Evaluation: The patient presents alert and oriented x4 sitting EOB with no complaints. He typically lives at home with his spouse and is independent in all functional mobility including yard work. Today he demonstrates no focal deficits in strength, sensation, or coordination other than his chronic peripheral neuropathy. He was able to safely ambulate in the hallway. He has no further needs for PT at this time. PT to sign off. Recommend discharge home with assist.     Time Calculation:         PT Charges       Row Name 04/28/25 0930             Time Calculation    Start Time 0930  -MS      Stop Time 0957  -MS      Time Calculation (min) 27 min  -MS      PT Received On 04/28/25  -MS         Untimed Charges    PT Eval/Re-eval Minutes 27  -MS         Total Minutes    Untimed Charges Total Minutes 27  -MS       Total Minutes 27  -MS                User Key  (r) = Recorded By, (t) = Taken By, (c) = Cosigned By      Initials Name Provider Type    Nidia Machuca PT, DPT, NCS Physical Therapist                      PT G-Codes  Outcome Measure Options: AM-PAC 6 Clicks Basic Mobility (PT)  AM-PAC 6 Clicks Score (PT): 24  AM-PAC 6 Clicks Score (OT): 21    PT Discharge Summary  Anticipated Discharge Disposition (PT): home with assist    Nidia Fulton PT, DPT, NCS  4/28/2025

## 2025-04-28 NOTE — PLAN OF CARE
Goal Outcome Evaluation:  Plan of Care Reviewed With: patient, spouse        Progress: no change     Alert and oriented x4 but forgetful at times. No seizure activity noted. Up with SBA. VSS. Safety maintained.

## 2025-04-28 NOTE — PLAN OF CARE
Goal Outcome Evaluation:  Plan of Care Reviewed With: patient        Progress: improving  Outcome Evaluation: The patient presents alert and oriented x4 sitting EOB with no complaints. He typically lives at home with his spouse and is independent in all functional mobility including yard work. Today he demonstrates no focal deficits in strength, sensation, or coordination other than his chronic peripheral neuropathy. He was able to safely ambulate in the hallway. He has no further needs for PT at this time. PT to sign off. Recommend discharge home with assist.    Anticipated Discharge Disposition (PT): home with assist

## 2025-04-28 NOTE — CONSULTS
[facility] - PODIATRY    Today's Date: 04/28/25     Patient Name: Carlos A Boyer Jr.  MRN: [id number]  [facility]: [id number]  PCP: Vinayak Correia PA  Referring Provider: Vish Latham MD  Attending Provider: Angela Cesar MD  Length of Stay: 2    SUBJECTIVE   Chief Complaint: Right 2nd toe infection    HPI: Carlos A Boyer Jr., a 65 y.o.male, presents to the emergency department for seizure.  Patient has a history of arthritis, asthma, carotid disease, diastolic congestive heart failure, coronary artery disease, diabetes mellitus with polyneuropathy, diverticulitis, GERD, hyperlipidemia, obesity, myocardial infarction, primary hypertension, psoriasis, stroke.  Was consulted to see patient for hardware complications and pain to the right foot.  He reports he has significant pain to his right second toe when walking.  Reports pain 9/10 regularly with walking.  Patient reports he had a previous wound to the right great toe and was seeing wound care.  He reports he had his hardware placed in his right hallux last summer by Dr. Villa.  Patient relates approximately 1 week ago he noticed pain to his right second and this continue to worsen throughout the week.  Patient also reports that he has significant neuropathy from diabetes mellitus.  Denies any constitutional symptoms. No other pedal complaints at this time.    Past Medical History:   Diagnosis Date    Arthritis     Asthma     Cancer     skin    Carotid disease, bilateral     Cellulitis     right foot - on antibiotics 6-    Chest pain     Chronic diastolic congestive heart failure 09/07/2019    Chronic sinusitis     Maribell bullosa     Coronary artery disease involving native coronary artery of native heart with unstable angina pectoris 01/17/2017    Deviated septum     Diabetes mellitus     Difficulty urinating     Diverticulitis     Enlarged prostate     Fatty liver     GERD (gastroesophageal reflux disease)     [place] (hard of  hearing)     Does have hearing aids    Hyperlipidemia LDL goal <70 02/02/2017    Hypertrophy of nasal turbinates     Keratoderma     Kidney stone     Lung nodules     lower right lung    Migraine     Murmur, heart     Myocardial infarction     Obesity     Paroxysmal atrial fibrillation 07/11/2019    Personal history of COVID-19 07/2021    PONV (postoperative nausea and vomiting)     Primary hypertension 10/16/2016    Psoriasis     Seizures     Sinus congestion     Skin cancer     Sleep apnea     not using cpap    SOB (shortness of breath)     Stroke     mild weakness on right side    UTI (urinary tract infection)      Past Surgical History:   Procedure Laterality Date    CARDIAC CATHETERIZATION  01/2016    Dr. Broadbent; widely patent previously placed stents in the left anterior descending and obstructive disease involving the diagonal branch which was treated medically    CARDIAC CATHETERIZATION N/A 07/14/2017    Procedure: Left Heart Cath;  Surgeon: Wade Ramey MD;  Location:  [facility] CATH INVASIVE LOC[facility];  Service:     CARDIAC CATHETERIZATION Left 10/15/2018    Procedure: Cardiac Catheterization/Vascular Study;  Surgeon: Wade Ramey MD;  Location:  [facility] CATH INV[facility];  Service: Cardiology    CARDIAC CATHETERIZATION  10/15/2018    Procedure: Functional Flow [place];  Surgeon: Wade Ramey MD;  Location:  [facility] CATH INV[facility]IVE [facility];  Service: Cardiology    CARDIAC CATHETERIZATION N/A 10/15/2018    Procedure: Left ventriculography;  Surgeon: Wade Ramey MD;  Location:  [facility] CATH INV[facility];  Service: Cardiology    CARDIAC CATHETERIZATION Left 06/26/2019    Procedure: Cardiac Catheterization/Vascular Study VEL OK  HE WILL WAIT 1 YEAR FOR SHOULDER SURGERY ;  Surgeon: Wade Ramey MD;  Location:  [facility] CATH [facility];  Service: Cardiology    CARDIAC CATHETERIZATION Left 04/30/2021    Procedure: Coronary angiography;  Surgeon: Sahil Llamas MD;  Location:  [facility] CATH INVASIVE  LOCATION;  Service: Cardiology;  Laterality: Left;    CARDIAC CATHETERIZATION N/A 04/30/2021    Procedure: Percutaneous Coronary Intervention;  Surgeon: Sahil Llamas MD;  Location:  [facility] CATH [facility];  Service: Cardiology;  Laterality: N/A;    CARDIAC CATHETERIZATION N/A 11/09/2022    Procedure: Left Heart Cath with SVGs;  Surgeon: Wade Ramey MD;  Location:  [facility] CATH INVAS[facility];  Service: Cardiology;  Laterality: N/A;    CARPAL TUNNEL RELEASE      January 2024 and pther hand February 2024    CHOLECYSTECTOMY      CHOLECYSTECTOMY WITH INTRAOPERATIVE CHOLANGIOGRAM N/A 08/01/2018    Procedure: CHOLECYSTECTOMY LAPAROSCOPIC INTRAOPERATIVE CHOLANGIOGRAM;  Surgeon: Shane Ann MD;  Location:  [facility] OR;  Service: General    COLONOSCOPY N/A 07/14/2020    Procedure: COLONOSCOPY WITH ANESTHESIA;  Surgeon: Anupam Morales DO;  Location: [facility] ENDOSCOPY;  Service: Gastroenterology;  Laterality: N/A;  pre: abdominal pain  post: diverticulosis  Vinayak Correia PA    CORONARY ANGIOPLASTY      CORONARY ARTERY BYPASS GRAFT N/A 07/06/2019    Procedure: CABG X2 WITH LIMA, LEFT LEG OVH, AND PLACEMENT OF LEFT FEMORAL ARTERIAL LINE;  Surgeon: Steven Tang MD;  Location:  [facility] OR;  Service: Cardiothoracic    CORONARY STENT PLACEMENT      x 6    CYSTOSCOPY TRANSURETHRAL RESECTION OF PROSTATE N/A 01/23/2023    Procedure: CYSTOSCOPY TRANSURETHRAL RESECTION OF PROSTATE;  Surgeon: Latrell Pope MD;  Location:  [facility] OR;  Service: Urology;  Laterality: N/A;    ENDOSCOPIC FUNCTIONAL SINUS SURGERY (FESS) Bilateral 12/13/2017    Procedure: PROCEDURE PERFORMED:  Bilateral functional endoscopic anterior ethmoidectomy with bilateral middle meatal antrostomy Septoplasty Right kathia bullosa resection Bilateral inferior turbinate reduction via Coblation;  Surgeon: Mayank Ibarra MD;  Location:  [facility] OR;  Service:     ENDOSCOPY N/A 07/30/2018    Procedure: ESOPHAGOGASTRODUODENOSCOPY WITH  ANESTHESIA;  Surgeon: Benitez Mas MD;  Location:  [facility] ENDOSCOPY;  Service: Gastroenterology    ENDOSCOPY N/A 07/14/2020    Procedure: ESOPHAGOGASTRODUODENOSCOPY WITH ANESTHESIA;  Surgeon: Anupam Morales DO;  Location:  [facility] ENDOSCOPY;  Service: Gastroenterology;  Laterality: N/A;  pre: abdominal pain  post: esophagitis  Vinayak Correia, PA    HERNIA REPAIR      x2 inguinal area    KIDNEY STONE SURGERY      KNEE ARTHROSCOPY Right 03/01/2022    Procedure: RIGHT KNEE PARTIAL LATERAL MENISCECTOMY;  Surgeon: Pedro Pablo Song MD;  Location:  [facility] OR;  Service: Orthopedics;  Laterality: Right;    KNEE SURGERY Right     OTHER SURGICAL HISTORY      urolift    PROSTATE SURGERY      Dr. Badillo - 2017    PULMONARY ARTERY PRESSURE SENSOR IMPLANT N/A 05/08/2023    Procedure: PA Pressure Sensor Implant;  Surgeon: Wade Ramey MD;  Location: [facility] CATH INVASIVE LOCATION;  Service: Cardiology;  Laterality: N/A;    ROTATOR CUFF REPAIR Right     SLEEP ENDOSCOPY N/A 02/27/2023    Procedure: Videosleep endoscopy;  Surgeon: Mayank Ibarra MD;  Location:  [facility] OR;  Service: ENT;  Laterality: N/A;    THUMB AMPUTATION Left     partial    TOE FUSION Right 7/3/2024    Procedure: Hallux Interphalangeal Joint Arthrodesis, Bone Graft Carlos - Right Foot;  Surgeon: Hernan Villa DPM;  Location:  [facility] OR;  Service: Podiatry;  Laterality: Right;    TOE SURGERY      great toe     Family History   Problem Relation Age of Onset    Heart disease Father     COPD Mother     Hypertension Mother     Asthma Mother     No Known Problems Sister     Colon cancer Paternal Uncle     Prostate cancer Maternal Grandfather     No Known Problems Sister     Colon cancer Maternal Grandmother     Colon polyps Maternal Grandmother      Social History     Socioeconomic History    Marital status:    Tobacco Use    Smoking status: Former     Current packs/day: 0.00     Average packs/day: 1.5 packs/day for 18.0 years (27.0 ttl  pk-yrs)     Types: Cigarettes, Cigars     Start date:      Quit date:      Years since quittin.3     Passive exposure: Past    Smokeless tobacco: Former     Types: Chew     Quit date:    Vaping Use    Vaping status: Never Used   Substance and Sexual Activity    Alcohol use: No     Comment: 0    Drug use: No    Sexual activity: Defer     Allergies   Allergen Reactions    Flagyl [Metronidazole] Hives    Atorvastatin Other (See Comments) and Myalgia      - LIPITOR -   Muscle cramps    Ciprofloxacin Hives      - CIPRO -      Current Facility-Administered Medications   Medication Dose Route Frequency Provider Last Rate Last Admin    acetaminophen (TYLENOL) tablet 325 mg  325 mg Oral Q6H PRN Vish Latham MD   325 mg at 25    albuterol (PROVENTIL) nebulizer solution 0.083% 2.5 mg/3mL  2.5 mg Nebulization Q6H PRN Vish Latham MD        apixaban (ELIQUIS) tablet 5 mg  5 mg Oral BID Vish Latham MD   5 mg at 25    aspirin EC tablet 81 mg  81 mg Oral Daily Vish Latham MD   81 mg at 25    sennosides-docusate (PERICOLACE) 8.6-50 MG per tablet 2 tablet  2 tablet Oral BID PRN Vish Latham MD        And    polyethylene glycol (MIRALAX) packet 17 g  17 g Oral Daily PRN Vish Latham MD        And    bisacodyl (DULCOLAX) EC tablet 5 mg  5 mg Oral Daily PRN Vish Latham MD        And    bisacodyl (DULCOLAX) suppository 10 mg  10 mg Rectal Daily PRN Vish Latham MD        cefTRIAXone (ROCEPHIN) 1,000 mg in sodium chloride 0.9 % 100 mL MBP  1,000 mg Intravenous Q24H Vish Latham  mL/hr at 25 1,000 mg at 25    dextrose (D50W) (25 g/50 mL) IV injection 25 g  25 g Intravenous Q15 Min PRN Vish Latham MD        dextrose (GLUTOSE) oral gel 15 g  15 g Oral Q15 Min PRN Vish Latham MD        docusate sodium (COLACE) capsule 100 mg  100 mg Oral Daily ETIENNEN Vish Latham MD        empagliflozin (JARDIANCE) tablet 25 mg  25 mg Oral Daily  Vish Latham MD   25 mg at 04/28/25 0922    fluticasone (FLONASE) 50 MCG/ACT nasal spray 2 spray  2 spray Nasal Daily Vish Latham MD   2 spray at 04/28/25 0923    furosemide (LASIX) tablet 40 mg  40 mg Oral Daily PRN Vish Latham MD        gabapentin (NEURONTIN) capsule 300 mg  300 mg Oral Nightly Vish Latham MD   300 mg at 04/27/25 2028    glucagon (GLUCAGEN) injection 1 mg  1 mg Intramuscular Q15 Min PRN Vish Latham MD        guaiFENesin (MUCINEX) 12 hr tablet 1,200 mg  1,200 mg Oral Daily PRN Vish Latham MD        insulin glargine (LANTUS, SEMGLEE) injection 25 Units  25 Units Subcutaneous Q12H Vish Latham MD   25 Units at 04/28/25 0920    Insulin Lispro (humaLOG) injection 2-9 Units  2-9 Units Subcutaneous 4x Daily AC & at Bedtime Vish Latham MD   6 Units at 04/28/25 1241    isosorbide mononitrate (IMDUR) 24 hr tablet 120 mg  120 mg Oral Daily Vish Latham MD   120 mg at 04/28/25 0922    lamoTRIgine (LaMICtal) tablet 200 mg  200 mg Oral BID Vish Latham MD   200 mg at 04/28/25 0922    levETIRAcetam (KEPPRA) tablet 1,500 mg  1,500 mg Oral Daily Vish Latham MD   1,500 mg at 04/28/25 0922    levETIRAcetam (KEPPRA) tablet 2,000 mg  2,000 mg Oral Nightly Vish Latham MD   2,000 mg at 04/27/25 2027    LORazepam (ATIVAN) injection 1 mg  1 mg Intravenous Q2H PRN Vish Latham MD        metoprolol tartrate (LOPRESSOR) tablet 100 mg  100 mg Oral BID Vish Latham MD   100 mg at 04/28/25 0922    nitroglycerin (NITROSTAT) SL tablet 0.4 mg  0.4 mg Sublingual Q5 Min PRN Vish Latham MD        ondansetron (ZOFRAN) injection 4 mg  4 mg Intravenous Q6H PRN Vish Latham MD        pantoprazole (PROTONIX) EC tablet 40 mg  40 mg Oral BID Vish Latham MD   40 mg at 04/28/25 0921    polyethylene glycol (MIRALAX) packet 17 g  17 g Oral Daily Vish Latham MD   17 g at 04/26/25 2038    rosuvastatin (CRESTOR) tablet 20 mg  20 mg Oral Nightly Vish Latham MD   20 mg at 04/27/25  2028    sacubitril-valsartan (ENTRESTO)  MG tablet 1 tablet  1 tablet Oral BID Vish Latham MD   1 tablet at 04/28/25 0921    sodium chloride 0.9 % flush 10 mL  10 mL Intravenous Q12H Vish Latham MD   10 mL at 04/28/25 1242    sodium chloride 0.9 % flush 10 mL  10 mL Intravenous PRN Vish Latham MD        sodium chloride 0.9 % infusion 40 mL  40 mL Intravenous PRN Vish Latham MD        tamsulosin (FLOMAX) 24 hr capsule 0.4 mg  0.4 mg Oral Daily Vish Latham MD   0.4 mg at 04/28/25 0922     Review of Systems   Constitutional:  Negative for activity change.   HENT:  Negative for congestion.    Respiratory:  Negative for apnea and shortness of breath.    Gastrointestinal:  Negative for abdominal pain.   Musculoskeletal:  Positive for arthralgias (Toe pain). Negative for back pain.   Neurological:  Positive for numbness.   Psychiatric/Behavioral:  Negative for agitation, behavioral problems and confusion.        OBJECTIVE     Vitals:    04/28/25 1100   BP: 114/66   Pulse: 54   Resp: 18   Temp: 97.7 °F (36.5 °C)   SpO2: 96%       PHYSICAL EXAM  GEN:   Pt presents in hospital bed. Accompanied by nursing.     Foot/Ankle Exam    GENERAL  Appearance:  appears stated age  Orientation:  AAOx3  Affect:  appropriate  Gait:  unimpaired  Assistance:  independent    VASCULAR     Right Foot Vascularity   Dorsalis pedis:  1+  Posterior tibial:  1+  Skin temperature:  warm  Edema grading:  None  CFT:  3  Pedal hair growth:  Present  Varicosities:  none     Left Foot Vascularity   Dorsalis pedis:  1+  Posterior tibial:  1+  Skin temperature:  warm  Edema grading:  None  CFT:  3  Pedal hair growth:  Present  Varicosities:  none     NEUROLOGIC     Right Foot Neurologic   Light touch sensation: diminished  Vibratory sensation: diminished  Hot/Cold sensation: diminished     Left Foot Neurologic   Light touch sensation: diminished  Vibratory sensation: diminished  Hot/Cold sensation:  diminished    MUSCULOSKELETAL      Right Foot Musculoskeletal   Ecchymosis:  none  Tenderness:  toe 2 tenderness    Arch:  Normal     Left Foot Musculoskeletal   Ecchymosis:  none  Tenderness:  none  Arch:  Normal    MUSCLE STRENGTH     Right Foot Muscle Strength   Foot dorsiflexion:  5  Foot plantar flexion:  5  Foot inversion:  5  Foot eversion:  5     Left Foot Muscle Strength   Foot dorsiflexion:  5  Foot plantar flexion:  5  Foot inversion:  5  Foot eversion:  5    RANGE OF MOTION     Right Foot Range of Motion   Foot and ankle ROM within normal limits       Left Foot Range of Motion   Foot and ankle ROM within normal limits      DERMATOLOGIC      Right Foot Dermatologic   Skin  Positive for drainage and ulcer.      Left Foot Dermatologic   Skin  Left foot skin is intact.      Right foot additional comments: Ulceration to right 2nd toe.  Approximately 1.0 cm x 1.0 cm.  Malodorous.   See attached photo.        RADIOLOGY/NUCLEAR:  MRI Brain With & Without Contrast  Result Date: 4/28/2025  Narrative: MRI BRAIN W WO CONTRAST- 4/28/2025 6:20 AM  HISTORY: recurrent breakthrough seizures; G40.909-Epilepsy, unspecified, not intractable, without status epilepticus  TECHNIQUE: Multisequence, multiplanar MRI of the brain before and after the intravenous administration of Vueway contrast.  COMPARISON: Brain MRI dated 12/1/2017  FINDINGS:  No diffusion signal abnormality. No intra-axial or extra-axial hemorrhage. No intracranial mass lesion or pathologic enhancement. Moderate chronic small vessel ischemic change. Hippocampal volumes appear symmetric. No obvious hippocampal sclerosis. Incidental tiny hippocampal cysts. Posterior fossa structures are unremarkable. Pituitary gland and sella are unremarkable. The major intracranial flow-voids are preserved. Orbital contents are unremarkable. The paranasal sinuses are clear. Partial right mastoid effusion. Normal marrow signal in the skull base and calvarium.      Impression:  1.  No acute intracranial process.  2.  No intracranial mass lesion or pathologic enhancement. 3.  No obvious hippocampal sclerosis. Hippocampal volumes and signal appear symmetric. 4.  Underlying moderate chronic small vessel ischemic change.  This report was signed and finalized on 4/28/2025 9:12 AM by Dr Merritt Reaves.      EEG  Result Date: 4/27/2025  Narrative: Reason for referral: 65 y.o.male with seizure Technical Summary:  A 19 channel digital EEG was performed using the international 10-20 placement system, including eye leads and EKG leads. Duration: 22 minutes Findings: The patient is asleep at the beginning of the study.  The background shows diffuse low amplitude theta present symmetrically over both hemispheres.  Toward the end of the study when the patient awakened there is an increase in faster theta and alpha frequencies.  A clear posterior rhythm is not seen.  Photic stimulation does not change the background.  Hyperventilation is not performed.  No focal features or epileptiform activity are present. Video: Available Technical quality: Good Rhythm strip: Regular, 60 bpm SUMMARY: Excessive drowsiness Otherwise unremarkable EEG in the mostly asleep and briefly awake states No focal features or epileptiform activity are seen     Impression: Normal study This report is transcribed using the Dragon dictation system.      XR Foot 2 View Right  Result Date: 4/27/2025  Narrative: XR FOOT 2 VW RIGHT-  HISTORY: right 2nd toe with wound and redness radiating up foot; G40.909-Epilepsy, unspecified, not intractable, without status epilepticus  COMPARISON: 7/27/2024  FINDINGS: 2 views of the right foot are obtained.  Surgical screw of the great toe from prior interphalangeal arthrodesis does demonstrate hardware loosening with mild retraction of the screw within the bony structure of the great toe. Advanced chronic arthritic changes of the first MTP and interphalangeal joint of the great toe.  There is soft tissue swelling of the distal right  second toe no convincing radiographic evidence of underlying osteomyelitis. There is small soft tissue air/subcutaneous emphysema within the edematous distal right second toe which may correspond to a region ulcerative defect. Calcaneal spurring. Similar calcaneal osteotomy changes suspected. Mild arthritic change midfoot      Impression: 1. Soft tissue swelling and mild subcutaneous emphysema involving the soft tissues of the distal right second toe with no convincing radiographic evidence of underlying osteomyelitis. 2. Hardware loosening involving the surgical screw of the great toe with mild retraction of the surgical screw compared to 7/27/2024.  This report was signed and finalized on 4/27/2025 11:42 AM by Dr. Ml Estrada MD.      CT Head Without Contrast  Result Date: 4/26/2025  Narrative: EXAMINATION: CT HEAD WO CONTRAST-  4/26/2025 4:58 PM  HISTORY: seizures  COMPARISON: 9/21/2023  DLP: 1069.19 mGy.cm  In order to have a CT radiation dose as low as reasonably achievable, Automated Exposure Control was utilized for adjustment of the mA and/or KV according to patient size.  TECHNIQUE: Serial axial tomographic images of the brain were obtained without the use of intravenous contrast.  Coronal and sagittal reformatted images were obtained reviewed as well.  FINDINGS: No mass effect or midline shift. No acute hemorrhage. There is mild low-attenuation in the subcortical and periventricular white matter as can be seen with chronic microvascular change. Vascular calcifications in the vertebral arteries in the intracranial portions of the carotid arteries.  Surrounding soft tissues are without acute findings. Orbits and globes are preserved. Paranasal sinus disease with an air-fluid level in the RIGHT maxillary sinus suggesting mild acute sinusitis. Mild scattered mucosal thickening in the ethmoid air cells. Small mastoid effusion on the RIGHT, unchanged from 2023 and likely chronic.       Impression:  1.  Mild  atrophy and chronic white matter changes but no definite acute intracranial process.  2.  Paranasal sinus disease as discussed above.  This report was signed and finalized on 4/26/2025 5:00 PM by Dr. Valentino Lebron MD.      XR Chest 1 View  Result Date: 4/26/2025  Narrative: XR CHEST 1 VW-  HISTORY: rule out pneumonia; R56.9-Unspecified convulsions  COMPARISON: 12/26/2024  FINDINGS: Frontal view of the chest obtained.  Stable cardiomegaly with evidence of prior mediastinal surgery. Chronic mild prominence of pulmonary vascular structures with no acute consolidation. No pleural effusion or pneumothorax. No acute regional bony pathology.      Impression: 1. Prior mediastinal surgery with stable cardiomegaly and mild chronic change. No acute abnormality.   This report was signed and finalized on 4/26/2025 10:14 AM by Dr. Ml Estrada MD.      CT Angiogram Chest  Result Date: 4/9/2025  Narrative: EXAMINATION: CT ANGIOGRAM CHEST-   4/9/2025 8:42 AM  HISTORY: Ascending Aortic Ansurysm; I71.21-Aneurysm of the ascending aorta, without rupture  In order to have a CT radiation dose as low as reasonably achievable Automated Exposure Control was utilized for adjustment of the mA and/or KV according to patient size.  CT Dose DLP = 420.13 mGy.cm. (If there are multiple studies performed at the same time this represents the total dose).  Images are stored in PACS per institutional protocol.  CT angiogram chest with IV contrast injection. CT angiography protocol. CT imaging with bolus IV contrast injection. Under concurrent supervision axial, sagittal, coronal, three-dimensional, and MIP data sets were constructed on an independent work station.  Comparison: 2/14/2024.  Heart size is at the upper limits of normal. Median sternotomy changes and coronary artery calcification. Symmetric and normally opacified pulmonary arteries. Unchanged 14 mm linear metal structure within the LEFT pulmonary artery. No mediastinal mass. Normal CT  appearance of the thyroid gland.  Stable dilated ascending thoracic aorta measuring 41 x 45 mm on axial image 66. Stable 34 mm aortic root measurement on coronal image 53. Stable 38 mm diameter of the distal ascending aorta near the innominate artery origin. No dissection.  Moderate degenerative disc and endplate change throughout the thoracic spine. Multiple (approximately 10) tiny scattered lung nodules are unchanged.      Impression: 1. Stable size dilated ascending thoracic aorta. 2. No significant change from last year.  This report was signed and finalized on 4/9/2025 10:32 AM by Dr. Celso Wynn MD.        LABORATORY/CULTURE RESULTS:  Results from last 7 days   Lab Units 04/28/25  0515 04/27/25  0409 04/26/25  1652   WBC 10*3/mm3 3.81 5.72 7.83   HEMOGLOBIN g/dL 13.7 15.2 14.5   HEMATOCRIT % 40.6 46.9 43.9   PLATELETS 10*3/mm3 73* 73* 87*     Results from last 7 days   Lab Units 04/28/25  0515 04/27/25  0409 04/26/25  1652   SODIUM mmol/L 136 136 136   POTASSIUM mmol/L 4.0 4.9 4.5   CHLORIDE mmol/L 104 99 100   CO2 mmol/L 21.0* 23.0 23.0   BUN mg/dL 20 15 15   CREATININE mg/dL 0.93 1.02 1.10   CALCIUM mg/dL 8.6 8.9 9.3   BILIRUBIN mg/dL 0.9 2.0* 1.6*   ALK PHOS U/L 59 63 64   ALT (SGPT) U/L 23 26 25   AST (SGOT) U/L 27 42* 31   GLUCOSE mg/dL 140* 177* 164*         Microbiology Results (last 10 days)       Procedure Component Value - Date/Time    Blood Culture With STACY - Blood, Arm, Left [[id number]]  (Normal) Collected: 04/27/25 0850    Lab Status: Preliminary result Specimen: Blood from Arm, Left Updated: 04/28/25 1016     Blood Culture No growth at 24 hours    Blood Culture With STACY - Blood, Arm, Right [[id number]]  (Normal) Collected: 04/27/25 0850    Lab Status: Preliminary result Specimen: Blood from Arm, Right Updated: 04/28/25 1016     Blood Culture No growth at 24 hours    Respiratory Panel PCR w/COVID-19(SARS-CoV-2) [facility] In-House, NP Swab in UTM/VTM, 2 HR TAT - Swab, Nasopharynx  [[id number]]  (Normal) Collected: 04/26/25 0050    Lab Status: Final result Specimen: Swab from Nasopharynx Updated: 04/26/25 0145     ADENOVIRUS, PCR Not Detected     Coronavirus 229E Not Detected     Coronavirus HKU1 Not Detected     Coronavirus NL63 Not Detected     Coronavirus OC43 Not Detected     COVID19 Not Detected     Human Metapneumovirus Not Detected     Human Rhinovirus/Enterovirus Not Detected     Influenza A PCR Not Detected     Influenza B PCR Not Detected     Parainfluenza Virus 1 Not Detected     Parainfluenza Virus 2 Not Detected     Parainfluenza Virus 3 Not Detected     Parainfluenza Virus 4 Not Detected     RSV, PCR Not Detected     Bordetella pertussis pcr Not Detected     Bordetella parapertussis PCR Not Detected     Chlamydophila pneumoniae PCR Not Detected     Mycoplasma pneumo by PCR Not Detected    Narrative:      In the setting of a positive respiratory panel with a viral infection PLUS a negative procalcitonin without other underlying concern for bacterial infection, consider observing off antibiotics or discontinuation of antibiotics and continue supportive care. If the respiratory panel is positive for atypical bacterial infection (Bordetella pertussis, Chlamydophila pneumoniae, or Mycoplasma pneumoniae), consider antibiotic de-escalation to target atypical bacterial infection.            PATHOLOGY RESULTS:       ASSESSMENT/PLAN   Right 2nd toe infection  Diabetes mellitus with polyneuropathy    Review of labs, imaging, and other provider documentation  Comprehensive lower extremity examination and evaluation was performed.    Xrays reviewed with patient.     Continue betadine gauze dressing to the right 2nd toe BID.   Antibiotics per hospitalist.     Discussed with patient that the current recommendation from a podiatry standpoint is a partial 2nd toe amputation and hardware removal from the great toe. Patient is agreeable to this course of treatment.     Patient will need clearance  from Neurology standpoint before any surgical procedure can be performed.      Thank you for the consult.  We will continue to follow patient while he is admitted.  It is also  recommended that the patient begin routine podiatry care due to diabetic neuropathy.    This document has been electronically signed by TEENA Nice on April 28, 2025 12:45 CDT

## 2025-04-28 NOTE — PROGRESS NOTES
[facility] Medicine Services  INPATIENT PROGRESS NOTE    Patient Name: Carlos A Boyer Jr.  Date of Admission: 4/26/2025  Today's Date: 04/28/25  Length of Stay: 2  Primary Care Physician: Vinayak Correia PA    Subjective   Chief Complaint: Right foot pain  HPI   No acute events overnight.  Patient is seen lying in bed in no acute distress without family at bedside.  He complains of right foot pain.    Review of Systems   All pertinent negatives and positives are as above. All other systems have been reviewed and are negative unless otherwise stated.     Objective    Temp:  [97.6 °F (36.4 °C)-98.9 °F (37.2 °C)] 98 °F (36.7 °C)  Heart Rate:  [58-63] 62  Resp:  [18] 18  BP: ()/(45-96) 122/96  Physical Exam  GEN: Awake, alert, interactive, in NAD on room air   HEENT: Atraumatic, EOMI, Anicteric  Lungs: CTAB  Heart: RRR  ABD: soft, nt/nd, +BS, no guarding/rebound  Extremities: right second digit wound   Skin: no rashes or lesions  Neuro: AAOx3, no focal deficits  Psych: normal mood & affect      Results Review:  I have reviewed the labs, radiology results, and diagnostic studies.    Laboratory Data:   Results from last 7 days   Lab Units 04/28/25  0515 04/27/25  0409 04/26/25  1652   WBC 10*3/mm3 3.81 5.72 7.83   HEMOGLOBIN g/dL 13.7 15.2 14.5   HEMATOCRIT % 40.6 46.9 43.9   PLATELETS 10*3/mm3 73* 73* 87*        Results from last 7 days   Lab Units 04/28/25  0515 04/27/25  0409 04/26/25  1652   SODIUM mmol/L 136 136 136   POTASSIUM mmol/L 4.0 4.9 4.5   CHLORIDE mmol/L 104 99 100   CO2 mmol/L 21.0* 23.0 23.0   BUN mg/dL 20 15 15   CREATININE mg/dL 0.93 1.02 1.10   CALCIUM mg/dL 8.6 8.9 9.3   BILIRUBIN mg/dL 0.9 2.0* 1.6*   ALK PHOS U/L 59 63 64   ALT (SGPT) U/L 23 26 25   AST (SGOT) U/L 27 42* 31   GLUCOSE mg/dL 140* 177* 164*       Culture Data:   @[facility]@    Radiology Data:   Imaging Results (Last 24 Hours)       Procedure Component Value Units Date/Time    MRI Brain  With & Without Contrast [[id number]] Collected: 04/28/25 0905     Updated: 04/28/25 0915    Narrative:      MRI BRAIN W WO CONTRAST- 4/28/2025 6:20 AM     HISTORY: recurrent breakthrough seizures; G40.909-Epilepsy, unspecified,  not intractable, without status epilepticus     TECHNIQUE: Multisequence, multiplanar MRI of the brain before and after  the intravenous administration of Vueway contrast.     COMPARISON: Brain MRI dated 12/1/2017     FINDINGS:     No diffusion signal abnormality. No intra-axial or extra-axial  hemorrhage. No intracranial mass lesion or pathologic enhancement.  Moderate chronic small vessel ischemic change. Hippocampal volumes  appear symmetric. No obvious hippocampal sclerosis. Incidental tiny  hippocampal cysts. Posterior fossa structures are unremarkable.  Pituitary gland and sella are unremarkable. The major intracranial  flow-voids are preserved. Orbital contents are unremarkable. The  paranasal sinuses are clear. Partial right mastoid effusion. Normal  marrow signal in the skull base and calvarium.       Impression:         1.  No acute intracranial process.  2.  No intracranial mass lesion or pathologic enhancement.  3.  No obvious hippocampal sclerosis. Hippocampal volumes and signal  appear symmetric.  4.  Underlying moderate chronic small vessel ischemic change.     This report was signed and finalized on 4/28/2025 9:12 AM by Dr Merritt Reaves.       XR Foot 2 View Right [[id number]] Collected: 04/27/25 1135     Updated: 04/27/25 1145    Narrative:      XR FOOT 2 VW RIGHT-     HISTORY: right 2nd toe with wound and redness radiating up foot;  G40.909-Epilepsy, unspecified, not intractable, without status  epilepticus     COMPARISON: 7/27/2024     FINDINGS: 2 views of the right foot are obtained.     Surgical screw of the great toe from prior interphalangeal arthrodesis  does demonstrate hardware loosening with mild retraction of the screw  within the bony structure of the great toe.  Advanced chronic arthritic  changes of the first MTP and interphalangeal joint of the great toe.     There is soft tissue swelling of the distal right second toe no  convincing radiographic evidence of underlying osteomyelitis. There is  small soft tissue air/subcutaneous emphysema within the edematous distal  right second toe which may correspond to a region ulcerative defect.  Calcaneal spurring. Similar calcaneal osteotomy changes suspected. Mild  arthritic change midfoot       Impression:      1. Soft tissue swelling and mild subcutaneous emphysema involving the  soft tissues of the distal right second toe with no convincing  radiographic evidence of underlying osteomyelitis.  2. Hardware loosening involving the surgical screw of the great toe with  mild retraction of the surgical screw compared to 7/27/2024.     This report was signed and finalized on 4/27/2025 11:42 AM by Dr. Ml Estrada MD.               I have reviewed the patient's current medications.     Assessment/Plan   Assessment  Active Hospital Problems    Diagnosis     **Recurrent seizures     Chronic heart failure with preserved ejection fraction (HFpEF)     Presence of CardioMEMS HF system     S/P CABG x 2     Chronic diastolic congestive heart failure with preserved ejection fraction     Atrial flutter     Class 1 obesity due to excess calories with serious comorbidity and body mass index (BMI) of 34.0 to 34.9 in adult     Diabetic peripheral neuropathy     Diabetic complication     Deviated nasal septum     Maribell bullosa     Benign non-nodular prostatic hyperplasia with lower urinary tract symptoms     Gastroesophageal reflux disease without esophagitis     Type 2 diabetes mellitus with circulatory disorder, with long-term current use of insulin        Treatment Plan    # Recurrent seizures  Reported 5 seizures at home, adherence to meds  Previously, last breakthrough seizure was > 3 years ago  CT Head: Mild atrophy and chronic white  matter changes but no definite acute intracranial process. Paranasal sinus disease with an air-fluid level in the right maxillary sinus suggesting mild acute sinusitis. Mild scattered mucosal thickening in the ethmoid air cells.  MRI brain: No acute findings.  - Continue levetiracetam and lamotrigine  - Neurology following --pending further workup including tick panel  - seizure disorder     # Febrile episode  # Right Foot wound   Tmax 101.5F on 4/26  - Continue Rocephin day 3  - Follow-up blood cultures, no growth at 24 hours  - Foot x-ray showing soft tissue swelling, mild emphysema and hardware loosening.  Will consult podiatry    # HFpEF s/p cardioMEMS 5/2023  # CAD s/p CABG  # Atrial flutter - on Eliquis   -Continue Imdur, statin, Lopressor, and Eliquis    # HOA - on CPAP    # Type II Diabetes with Neuropathy - HgbA1c 7.6%  - Continue Lantus 25 every 12 and SSI  - Continue Jardiance gabapentin    # Class I Obesity - Body mass index is 32.94 kg/m².     DVT prophylaxis: Home Eliquis  GI prophylaxis: Protonix    Medical Decision Making  Number and Complexity of problems: 1 acute moderate complexity, 1 acute on chronic moderate complexity, others chronic and stable   Differential Diagnosis: As above    Conditions and Status        New to me but stable.     [facility] Data  External documents reviewed: Reviewed  Cardiac tracing (EKG, telemetry) interpretation: Reviewed  Radiology interpretation: Reviewed  Labs reviewed: As above  Any tests that were considered but not ordered: None     Decision rules/scores evaluated (example [id number], Wells, etc): None     Discussed with: Patient and RN     Care Planning  Shared decision making: Patient apprised of current labs, vitals, imaging and treatment plan.  They are agreeable with proceeding with plans as discussed.   Code status and discussions: Full code    Disposition  Social Determinants of Health that impact treatment or disposition: None  I expect the patient to be  discharged to TBD in TBD days.         Electronically signed by Angela Cesar MD, 04/28/25, 10:00 CDT.

## 2025-04-28 NOTE — PLAN OF CARE
Goal Outcome Evaluation:  Plan of Care Reviewed With: patient, caregiver        Progress: improving  Outcome Evaluation: Nutrition assessment completed. Pt reports good appetite. Pt reports he follows a diabetic diet. Pt with fissure to coccyx and diabetic wound to right second toe (necrotic tissue),diabetic wound to right 1st med head,right planter great toe (eschar) per wound care assessment today. CCHO/Cardiac diet. PO intake 100% of two meals, Boost Glucose Control daily with lunch. Advised of alternate menu selections. Con to follow for plan of care.

## 2025-04-29 PROBLEM — Z96.9 RETAINED ORTHOPEDIC HARDWARE: Status: ACTIVE | Noted: 2025-04-26

## 2025-04-29 PROBLEM — A48.0 GAS GANGRENE OF FOOT: Status: ACTIVE | Noted: 2025-04-26

## 2025-04-29 LAB
ANION GAP SERPL CALCULATED.3IONS-SCNC: 12 MMOL/L (ref 5–15)
B BURGDOR IGG+IGM SER QL IA: NEGATIVE
BUN SERPL-MCNC: 19 MG/DL (ref 8–23)
BUN/CREAT SERPL: 21.1 (ref 7–25)
CALCIUM SPEC-SCNC: 8.8 MG/DL (ref 8.6–10.5)
CHLORIDE SERPL-SCNC: 103 MMOL/L (ref 98–107)
CO2 SERPL-SCNC: 21 MMOL/L (ref 22–29)
CREAT SERPL-MCNC: 0.9 MG/DL (ref 0.76–1.27)
DEPRECATED RDW RBC AUTO: 44.8 FL (ref 37–54)
EGFRCR SERPLBLD CKD-EPI 2021: 93.6 ML/MIN/1.73
ERYTHROCYTE [DISTWIDTH] IN BLOOD BY AUTOMATED COUNT: 13.4 % (ref 12.3–15.4)
GLUCOSE BLDC GLUCOMTR-MCNC: 169 MG/DL (ref 70–130)
GLUCOSE BLDC GLUCOMTR-MCNC: 172 MG/DL (ref 70–130)
GLUCOSE BLDC GLUCOMTR-MCNC: 194 MG/DL (ref 70–130)
GLUCOSE BLDC GLUCOMTR-MCNC: 248 MG/DL (ref 70–130)
GLUCOSE SERPL-MCNC: 201 MG/DL (ref 65–99)
HCT VFR BLD AUTO: 41 % (ref 37.5–51)
HGB BLD-MCNC: 13.5 G/DL (ref 13–17.7)
MCH RBC QN AUTO: 30 PG (ref 26.6–33)
MCHC RBC AUTO-ENTMCNC: 32.9 G/DL (ref 31.5–35.7)
MCV RBC AUTO: 91.1 FL (ref 79–97)
PLATELET # BLD AUTO: 79 10*3/MM3 (ref 140–450)
PMV BLD AUTO: 10.8 FL (ref 6–12)
POTASSIUM SERPL-SCNC: 4.2 MMOL/L (ref 3.5–5.2)
RBC # BLD AUTO: 4.5 10*6/MM3 (ref 4.14–5.8)
SODIUM SERPL-SCNC: 136 MMOL/L (ref 136–145)
WBC NRBC COR # BLD AUTO: 3.35 10*3/MM3 (ref 3.4–10.8)

## 2025-04-29 PROCEDURE — 80048 BASIC METABOLIC PNL TOTAL CA: CPT | Performed by: STUDENT IN AN ORGANIZED HEALTH CARE EDUCATION/TRAINING PROGRAM

## 2025-04-29 PROCEDURE — 63710000001 INSULIN LISPRO (HUMAN) PER 5 UNITS: Performed by: FAMILY MEDICINE

## 2025-04-29 PROCEDURE — 85027 COMPLETE CBC AUTOMATED: CPT | Performed by: STUDENT IN AN ORGANIZED HEALTH CARE EDUCATION/TRAINING PROGRAM

## 2025-04-29 PROCEDURE — 99233 SBSQ HOSP IP/OBS HIGH 50: CPT | Performed by: CLINICAL NURSE SPECIALIST

## 2025-04-29 PROCEDURE — 82948 REAGENT STRIP/BLOOD GLUCOSE: CPT

## 2025-04-29 PROCEDURE — 63710000001 INSULIN GLARGINE PER 5 UNITS: Performed by: FAMILY MEDICINE

## 2025-04-29 PROCEDURE — 25010000002 CEFTRIAXONE PER 250 MG: Performed by: FAMILY MEDICINE

## 2025-04-29 PROCEDURE — 99233 SBSQ HOSP IP/OBS HIGH 50: CPT | Performed by: PODIATRIST

## 2025-04-29 RX ADMIN — METOPROLOL TARTRATE 100 MG: 100 TABLET, FILM COATED ORAL at 00:24

## 2025-04-29 RX ADMIN — TAMSULOSIN HYDROCHLORIDE 0.4 MG: 0.4 CAPSULE ORAL at 08:41

## 2025-04-29 RX ADMIN — ASPIRIN 81 MG: 81 TABLET, COATED ORAL at 08:42

## 2025-04-29 RX ADMIN — APIXABAN 5 MG: 5 TABLET, FILM COATED ORAL at 20:28

## 2025-04-29 RX ADMIN — GABAPENTIN 300 MG: 300 CAPSULE ORAL at 20:30

## 2025-04-29 RX ADMIN — LAMOTRIGINE 200 MG: 100 TABLET ORAL at 08:41

## 2025-04-29 RX ADMIN — LAMOTRIGINE 200 MG: 100 TABLET ORAL at 20:30

## 2025-04-29 RX ADMIN — SACUBITRIL AND VALSARTAN 1 TABLET: 97; 103 TABLET, FILM COATED ORAL at 08:42

## 2025-04-29 RX ADMIN — PANTOPRAZOLE SODIUM 40 MG: 40 TABLET, DELAYED RELEASE ORAL at 08:42

## 2025-04-29 RX ADMIN — Medication 10 ML: at 20:32

## 2025-04-29 RX ADMIN — INSULIN LISPRO 2 UNITS: 100 INJECTION, SOLUTION INTRAVENOUS; SUBCUTANEOUS at 08:40

## 2025-04-29 RX ADMIN — PANTOPRAZOLE SODIUM 40 MG: 40 TABLET, DELAYED RELEASE ORAL at 20:30

## 2025-04-29 RX ADMIN — LEVETIRACETAM 2000 MG: 500 TABLET, FILM COATED ORAL at 20:30

## 2025-04-29 RX ADMIN — METOPROLOL TARTRATE 100 MG: 100 TABLET, FILM COATED ORAL at 08:41

## 2025-04-29 RX ADMIN — SACUBITRIL AND VALSARTAN 1 TABLET: 97; 103 TABLET, FILM COATED ORAL at 20:31

## 2025-04-29 RX ADMIN — INSULIN LISPRO 2 UNITS: 100 INJECTION, SOLUTION INTRAVENOUS; SUBCUTANEOUS at 12:31

## 2025-04-29 RX ADMIN — FLUTICASONE PROPIONATE 2 SPRAY: 50 SPRAY, METERED NASAL at 08:45

## 2025-04-29 RX ADMIN — INSULIN GLARGINE 25 UNITS: 100 INJECTION, SOLUTION SUBCUTANEOUS at 20:33

## 2025-04-29 RX ADMIN — Medication 10 ML: at 08:47

## 2025-04-29 RX ADMIN — ISOSORBIDE MONONITRATE 120 MG: 30 TABLET, EXTENDED RELEASE ORAL at 10:11

## 2025-04-29 RX ADMIN — ROSUVASTATIN CALCIUM 20 MG: 20 TABLET, FILM COATED ORAL at 20:30

## 2025-04-29 RX ADMIN — INSULIN GLARGINE 25 UNITS: 100 INJECTION, SOLUTION SUBCUTANEOUS at 08:40

## 2025-04-29 RX ADMIN — EMPAGLIFLOZIN 25 MG: 10 TABLET, FILM COATED ORAL at 08:42

## 2025-04-29 RX ADMIN — LEVETIRACETAM 1500 MG: 500 TABLET, FILM COATED ORAL at 08:42

## 2025-04-29 RX ADMIN — SODIUM CHLORIDE 1000 MG: 900 INJECTION INTRAVENOUS at 20:34

## 2025-04-29 RX ADMIN — INSULIN LISPRO 2 UNITS: 100 INJECTION, SOLUTION INTRAVENOUS; SUBCUTANEOUS at 17:18

## 2025-04-29 NOTE — PLAN OF CARE
Goal Outcome Evaluation:              Outcome Evaluation: ALOX4. RENE. ACHS. up at [facility]. drsg on toe looks CDI. wife at bedside.

## 2025-04-29 NOTE — PROGRESS NOTES
[facility] - PODIATRY    Today's Date: 04/29/25     Patient Name: Carlos A Boyer Jr.  MRN: [id number]  CSN: [id number]  PCP: Vinayak Correia PA  Referring Provider: Vish Latham MD  Attending Provider: Anglea Cesar MD  Length of Stay: 3    SUBJECTIVE   Chief Complaint: Right 1st and 2nd toes    HPI: Carlos A Boyer Jr., a 65 y.o.male.  Bandaging performed per nursing.  Concern for ongoing infection to the right second toe and backing out of hardware from the great toe.  Relates that he is open to surgical intervention including toe amputation if needed.      Past Medical History:   Diagnosis Date    Arthritis     Asthma     Cancer     skin    Carotid disease, bilateral     Cellulitis     right foot - on antibiotics 6-    Chest pain     Chronic diastolic congestive heart failure 09/07/2019    Chronic sinusitis     Maribell bullosa     Coronary artery disease involving native coronary artery of native heart with unstable angina pectoris 01/17/2017    Deviated septum     Diabetes mellitus     Difficulty urinating     Diverticulitis     Enlarged prostate     Fatty liver     GERD (gastroesophageal reflux disease)     [place] (hard of hearing)     Does have hearing aids    Hyperlipidemia LDL goal <70 02/02/2017    Hypertrophy of nasal turbinates     Keratoderma     Kidney stone     Lung nodules     lower right lung    Migraine     Murmur, heart     Myocardial infarction     Obesity     Paroxysmal atrial fibrillation 07/11/2019    Personal history of COVID-19 07/2021    PONV (postoperative nausea and vomiting)     Primary hypertension 10/16/2016    Psoriasis     Seizures     Sinus congestion     Skin cancer     Sleep apnea     not using cpap    SOB (shortness of breath)     Stroke     mild weakness on right side    UTI (urinary tract infection)      Past Surgical History:   Procedure Laterality Date    CARDIAC CATHETERIZATION  01/2016    Dr. Broadbent; widely patent previously placed  stents in the left anterior descending and obstructive disease involving the diagonal branch which was treated medically    CARDIAC CATHETERIZATION N/A 07/14/2017    Procedure: Left Heart Cath;  Surgeon: Wade Ramey MD;  Location:  [facility]ATH [facility];  Service:     CARDIAC CATHETERIZATION Left 10/15/2018    Procedure: Cardiac Catheterization/Vascular Study;  Surgeon: Wade Ramey MD;  Location:  [facility];  Service: Cardiology    CARDIAC CATHETERIZATION  10/15/2018    Procedure: Functional Flow [place];  Surgeon: Wade Ramey MD;  Location:  [facility];  Service: Cardiology    CARDIAC CATHETERIZATION N/A 10/15/2018    Procedure: Left ventriculography;  Surgeon: Wade Ramey MD;  Location:  [facility];  Service: Cardiology    CARDIAC CATHETERIZATION Left 06/26/2019    Procedure: Cardiac Catheterization/Vascular Study VEL OK  HE WILL WAIT 1 YEAR FOR SHOULDER SURGERY ;  Surgeon: Wade Ramey MD;  Location:  [facility];  Service: Cardiology    CARDIAC CATHETERIZATION Left 04/30/2021    Procedure: Coronary angiography;  Surgeon: Sahil Llamas MD;  Location:  [facility];  Service: Cardiology;  Laterality: Left;    CARDIAC CATHETERIZATION N/A 04/30/2021    Procedure: Percutaneous Coronary Intervention;  Surgeon: Sahil Llamas MD;  Location:  [facility];  Service: Cardiology;  Laterality: N/A;    CARDIAC CATHETERIZATION N/A 11/09/2022    Procedure: Left Heart Cath with SVGs;  Surgeon: Wade Ramey MD;  Location:  [facility];  Service: Cardiology;  Laterality: N/A;    CARPAL TUNNEL RELEASE      January 2024 and pther hand February 2024    CHOLECYSTECTOMY      CHOLECYSTECTOMY WITH INTRAOPERATIVE CHOLANGIOGRAM N/A 08/01/2018    Procedure: CHOLECYSTECTOMY LAPAROSCOPIC INTRAOPERATIVE CHOLANGIOGRAM;  Surgeon: Shane Ann MD;  Location: [facility] OR;  Service: General     COLONOSCOPY N/A 07/14/2020    Procedure: COLONOSCOPY WITH ANESTHESIA;  Surgeon: Anupam Morales DO;  Location: [facility] ENDOSCOPY;  Service: Gastroenterology;  Laterality: N/A;  pre: abdominal pain  post: diverticulosis  Vinayak Correia PA    CORONARY ANGIOPLASTY      CORONARY ARTERY BYPASS GRAFT N/A 07/06/2019    Procedure: CABG X2 WITH LIMA, LEFT LEG OVH, AND PLACEMENT OF LEFT FEMORAL ARTERIAL LINE;  Surgeon: Steven Tang MD;  Location:  [facility] OR;  Service: Cardiothoracic    CORONARY STENT PLACEMENT      x 6    CYSTOSCOPY TRANSURETHRAL RESECTION OF PROSTATE N/A 01/23/2023    Procedure: CYSTOSCOPY TRANSURETHRAL RESECTION OF PROSTATE;  Surgeon: Latrell Pope MD;  Location:  [facility] OR;  Service: Urology;  Laterality: N/A;    ENDOSCOPIC FUNCTIONAL SINUS SURGERY (FESS) Bilateral 12/13/2017    Procedure: PROCEDURE PERFORMED:  Bilateral functional endoscopic anterior ethmoidectomy with bilateral middle meatal antrostomy Septoplasty Right kathia bullosa resection Bilateral inferior turbinate reduction via Coblation;  Surgeon: Mayank Ibarra MD;  Location:  [facility] OR;  Service:     ENDOSCOPY N/A 07/30/2018    Procedure: ESOPHAGOGASTRODUODENOSCOPY WITH ANESTHESIA;  Surgeon: Benitez Mas MD;  Location: [facility] ENDOSCOPY;  Service: Gastroenterology    ENDOSCOPY N/A 07/14/2020    Procedure: ESOPHAGOGASTRODUODENOSCOPY WITH ANESTHESIA;  Surgeon: Anupam Morales DO;  Location: [facility] ENDOSCOPY;  Service: Gastroenterology;  Laterality: N/A;  pre: abdominal pain  post: esophagitis  Vinayak Correia PA    HERNIA REPAIR      x2 inguinal area    KIDNEY STONE SURGERY      KNEE ARTHROSCOPY Right 03/01/2022    Procedure: RIGHT KNEE PARTIAL LATERAL MENISCECTOMY;  Surgeon: Pedro Pablo Song MD;  Location:  [facility] OR;  Service: Orthopedics;  Laterality: Right;    KNEE SURGERY Right     OTHER SURGICAL HISTORY      urolift    PROSTATE SURGERY      Dr. Badillo - 2017    PULMONARY ARTERY PRESSURE SENSOR IMPLANT  N/A 2023    Procedure: PA Pressure Sensor Implant;  Surgeon: Wade Ramey MD;  Location:  [facility];  Service: Cardiology;  Laterality: N/A;    ROTATOR CUFF REPAIR Right     SLEEP ENDOSCOPY N/A 2023    Procedure: Videosleep endoscopy;  Surgeon: Mayank Ibarra MD;  Location:  [facility] OR;  Service: ENT;  Laterality: N/A;    THUMB AMPUTATION Left     partial    TOE FUSION Right 7/3/2024    Procedure: Hallux Interphalangeal Joint Arthrodesis, Bone Graft [place] - Right Foot;  Surgeon: Hernan Villa DPM;  Location:  [facility] OR;  Service: Podiatry;  Laterality: Right;    TOE SURGERY      great toe     Family History   Problem Relation Age of Onset    Heart disease Father     COPD Mother     Hypertension Mother     Asthma Mother     No Known Problems Sister     Colon cancer Paternal Uncle     Prostate cancer Maternal Grandfather     No Known Problems Sister     Colon cancer Maternal Grandmother     Colon polyps Maternal Grandmother      Social History     Socioeconomic History    Marital status:    Tobacco Use    Smoking status: Former     Current packs/day: 0.00     Average packs/day: 1.5 packs/day for 18.0 years (27.0 ttl pk-yrs)     Types: Cigarettes, Cigars     Start date:      Quit date:      Years since quittin.3     Passive exposure: Past    Smokeless tobacco: Former     Types: Chew     Quit date:    Vaping Use    Vaping status: Never Used   Substance and Sexual Activity    Alcohol use: No     Comment: 0    Drug use: No    Sexual activity: Defer     Allergies   Allergen Reactions    Flagyl [Metronidazole] Hives    Atorvastatin Other (See Comments) and Myalgia      - LIPITOR -   Muscle cramps    Ciprofloxacin Hives      - CIPRO -      Current Facility-Administered Medications   Medication Dose Route Frequency Provider Last Rate Last Admin    acetaminophen (TYLENOL) tablet 325 mg  325 mg Oral Q6H PRN Vish Latham MD   325 mg at 25     albuterol (PROVENTIL) nebulizer solution 0.083% 2.5 mg/3mL  2.5 mg Nebulization Q6H PRN Vish Latham MD        apixaban (ELIQUIS) tablet 5 mg  5 mg Oral BID Vish Latham MD   5 mg at 04/28/25 2028    aspirin EC tablet 81 mg  81 mg Oral Daily Vish Latham MD   81 mg at 04/29/25 0842    sennosides-docusate (PERICOLACE) 8.6-50 MG per tablet 2 tablet  2 tablet Oral BID PRN Vish Latham MD        And    polyethylene glycol (MIRALAX) packet 17 g  17 g Oral Daily PRN Vish Latham MD        And    bisacodyl (DULCOLAX) EC tablet 5 mg  5 mg Oral Daily PRN Vish Latham MD        And    bisacodyl (DULCOLAX) suppository 10 mg  10 mg Rectal Daily PRN Vish Latham MD        cefTRIAXone (ROCEPHIN) 1,000 mg in sodium chloride 0.9 % 100 mL MBP  1,000 mg Intravenous Q24H Vish Latham  mL/hr at 04/28/25 2026 1,000 mg at 04/28/25 2026    dextrose (D50W) (25 g/50 mL) IV injection 25 g  25 g Intravenous Q15 Min PRN Vish Latham MD        dextrose (GLUTOSE) oral gel 15 g  15 g Oral Q15 Min PRN Vish Latham MD        docusate sodium (COLACE) capsule 100 mg  100 mg Oral Daily PRN Vish Latham MD        empagliflozin (JARDIANCE) tablet 25 mg  25 mg Oral Daily Vish Latham MD   25 mg at 04/29/25 0842    fluticasone (FLONASE) 50 MCG/ACT nasal spray 2 spray  2 spray Nasal Daily Vish Latham MD   2 spray at 04/29/25 0845    furosemide (LASIX) tablet 40 mg  40 mg Oral Daily PRN Vish Latham MD        gabapentin (NEURONTIN) capsule 300 mg  300 mg Oral Nightly Vish Latham MD   300 mg at 04/28/25 2028    glucagon (GLUCAGEN) injection 1 mg  1 mg Intramuscular Q15 Min PRN Vish Latham MD        guaiFENesin (MUCINEX) 12 hr tablet 1,200 mg  1,200 mg Oral Daily PRN Vish Latham MD        insulin glargine (LANTUS, SEMGLEE) injection 25 Units  25 Units Subcutaneous Q12H Vish Latham MD   25 Units at 04/29/25 0840    Insulin Lispro (humaLOG) injection 2-9 Units  2-9 Units Subcutaneous 4x Daily AC &  at Bedtime Vish Latham MD   2 Units at 04/29/25 1231    isosorbide mononitrate (IMDUR) 24 hr tablet 120 mg  120 mg Oral Daily Vish Latham MD   120 mg at 04/29/25 1011    lamoTRIgine (LaMICtal) tablet 200 mg  200 mg Oral BID Vish Latham MD   200 mg at 04/29/25 0841    levETIRAcetam (KEPPRA) tablet 1,500 mg  1,500 mg Oral Daily Vish Latham MD   1,500 mg at 04/29/25 0842    levETIRAcetam (KEPPRA) tablet 2,000 mg  2,000 mg Oral Nightly Vish Latham MD   2,000 mg at 04/28/25 2027    LORazepam (ATIVAN) injection 1 mg  1 mg Intravenous Q2H PRN Vish Latham MD        metoprolol tartrate (LOPRESSOR) tablet 100 mg  100 mg Oral BID Vish Latham MD   100 mg at 04/29/25 0841    nitroglycerin (NITROSTAT) SL tablet 0.4 mg  0.4 mg Sublingual Q5 Min PRN Vish Latham MD        ondansetron (ZOFRAN) injection 4 mg  4 mg Intravenous Q6H PRN Vish Latham MD        pantoprazole (PROTONIX) EC tablet 40 mg  40 mg Oral BID Vish Latham MD   40 mg at 04/29/25 0842    polyethylene glycol (MIRALAX) packet 17 g  17 g Oral Daily Vish Latham MD   17 g at 04/26/25 2038    rosuvastatin (CRESTOR) tablet 20 mg  20 mg Oral Nightly Vish Latham MD   20 mg at 04/28/25 2028    sacubitril-valsartan (ENTRESTO)  MG tablet 1 tablet  1 tablet Oral BID Vish Latham MD   1 tablet at 04/29/25 0842    sodium chloride 0.9 % flush 10 mL  10 mL Intravenous Q12H Vish Latham MD   10 mL at 04/29/25 0847    sodium chloride 0.9 % flush 10 mL  10 mL Intravenous PRN Vish Latham MD        sodium chloride 0.9 % infusion 40 mL  40 mL Intravenous PRN Vish Latham MD        tamsulosin (FLOMAX) 24 hr capsule 0.4 mg  0.4 mg Oral Daily Vish Latham MD   0.4 mg at 04/29/25 0841     Review of Systems   Constitutional:  Negative for chills and fever.   HENT:  Negative for congestion.    Respiratory:  Negative for shortness of breath.    Cardiovascular:  Negative for chest pain.   Gastrointestinal:  Negative for  constipation, diarrhea, nausea and vomiting.   Musculoskeletal:         Foot pain   Skin:  Positive for color change and wound.   Neurological:  Positive for seizures and numbness.   Psychiatric/Behavioral:  Negative for agitation.        OBJECTIVE     Vitals:    04/29/25 1150   BP: 111/63   Pulse: 69   Resp: 16   Temp: 97.6 °F (36.4 °C)   SpO2: 91%       PHYSICAL EXAM  GEN:   Pt presents in hospital bed. Accompanied by none.     Physical Exam  Vitals reviewed.   Constitutional:       Appearance: Normal appearance.   HENT:      Head: Normocephalic.      Right Ear: Tympanic membrane normal.      Left Ear: Tympanic membrane normal.      Nose: Nose normal.      Mouth/Throat:      Pharynx: Oropharynx is clear.   Cardiovascular:      Rate and Rhythm: Normal rate.      Pulses: Normal pulses.           Dorsalis pedis pulses are 2+ on the right side and 2+ on the left side.        Posterior tibial pulses are 2+ on the right side and 2+ on the left side.      Heart sounds: Normal heart sounds.   Pulmonary:      Effort: Pulmonary effort is normal.      Breath sounds: Normal breath sounds.   Abdominal:      General: Bowel sounds are normal.   Musculoskeletal:      Cervical back: Normal range of motion and neck supple.   Feet:      Right foot:      Skin integrity: Warmth present.      Left foot:      Skin integrity: Warmth present.   Neurological:      Mental Status: He is alert and oriented to person, place, and time.   Psychiatric:         Mood and Affect: Mood normal.         Behavior: Behavior normal.         Thought Content: Thought content normal.         Judgment: Judgment normal.          Foot/Ankle Exam    GENERAL  Appearance:  appears stated age  Orientation:  AAOx3  Affect:  appropriate    VASCULAR     Right Foot Vascularity   Dorsalis pedis:  2+  Posterior tibial:  2+  Skin temperature:  warm  Edema grading:  Trace  CFT:  3  Pedal hair growth:  Present     Left Foot Vascularity   Dorsalis pedis:  2+  Posterior  tibial:  2+  Skin temperature:  warm  Edema grading:  Trace  CFT:  3  Pedal hair growth:  Present     NEUROLOGIC     Right Foot Neurologic   Light touch sensation: diminished  Vibratory sensation: diminished  Hot/Cold sensation: diminished     Left Foot Neurologic   Light touch sensation: diminished  Vibratory sensation: diminished  Hot/Cold sensation:  diminished    MUSCULOSKELETAL     Right Foot Musculoskeletal   Tenderness:  toe 1 tenderness and toe 2 tenderness    Arch:  Normal     Left Foot Musculoskeletal   Tenderness:  none  Arch:  Normal    MUSCLE STRENGTH     Right Foot Muscle Strength   Foot dorsiflexion:  4+  Foot plantar flexion:  4+  Foot inversion:  4+  Foot eversion:  4+     Left Foot Muscle Strength   Foot dorsiflexion:  4+  Foot plantar flexion:  4+  Foot inversion:  4+  Foot eversion:  4+    RANGE OF MOTION     Right Foot Range of Motion   Foot and ankle ROM within normal limits       Left Foot Range of Motion   Foot and ankle ROM within normal limits      DERMATOLOGIC      Right Foot Dermatologic   Skin  Positive for cellulitis and gangrene.      Left Foot Dermatologic   Skin  Left foot skin is intact.      Right foot additional comments: Necrosis and malodorous changes to the distal second toe with scant purulence.           RADIOLOGY/NUCLEAR:  MRI Brain With & Without Contrast  Result Date: 4/28/2025  Narrative: MRI BRAIN W WO CONTRAST- 4/28/2025 6:20 AM  HISTORY: recurrent breakthrough seizures; G40.909-Epilepsy, unspecified, not intractable, without status epilepticus  TECHNIQUE: Multisequence, multiplanar MRI of the brain before and after the intravenous administration of Vueway contrast.  COMPARISON: Brain MRI dated 12/1/2017  FINDINGS:  No diffusion signal abnormality. No intra-axial or extra-axial hemorrhage. No intracranial mass lesion or pathologic enhancement. Moderate chronic small vessel ischemic change. Hippocampal volumes appear symmetric. No obvious hippocampal sclerosis.  Incidental tiny hippocampal cysts. Posterior fossa structures are unremarkable. Pituitary gland and sella are unremarkable. The major intracranial flow-voids are preserved. Orbital contents are unremarkable. The paranasal sinuses are clear. Partial right mastoid effusion. Normal marrow signal in the skull base and calvarium.      Impression:  1.  No acute intracranial process. 2.  No intracranial mass lesion or pathologic enhancement. 3.  No obvious hippocampal sclerosis. Hippocampal volumes and signal appear symmetric. 4.  Underlying moderate chronic small vessel ischemic change.  This report was signed and finalized on 4/28/2025 9:12 AM by Dr Merritt Reaves.      EEG  Result Date: 4/27/2025  Narrative: Reason for referral: 65 y.o.male with seizure Technical Summary:  A 19 channel digital EEG was performed using the international 10-20 placement system, including eye leads and EKG leads. Duration: 22 minutes Findings: The patient is asleep at the beginning of the study.  The background shows diffuse low amplitude theta present symmetrically over both hemispheres.  Toward the end of the study when the patient awakened there is an increase in faster theta and alpha frequencies.  A clear posterior rhythm is not seen.  Photic stimulation does not change the background.  Hyperventilation is not performed.  No focal features or epileptiform activity are present. Video: Available Technical quality: Good Rhythm strip: Regular, 60 bpm SUMMARY: Excessive drowsiness Otherwise unremarkable EEG in the mostly asleep and briefly awake states No focal features or epileptiform activity are seen     Impression: Normal study This report is transcribed using the Dragon dictation system.      XR Foot 2 View Right  Result Date: 4/27/2025  Narrative: XR FOOT 2 VW RIGHT-  HISTORY: right 2nd toe with wound and redness radiating up foot; G40.909-Epilepsy, unspecified, not intractable, without status epilepticus  COMPARISON: 7/27/2024   FINDINGS: 2 views of the right foot are obtained.  Surgical screw of the great toe from prior interphalangeal arthrodesis does demonstrate hardware loosening with mild retraction of the screw within the bony structure of the great toe. Advanced chronic arthritic changes of the first MTP and interphalangeal joint of the great toe.  There is soft tissue swelling of the distal right second toe no convincing radiographic evidence of underlying osteomyelitis. There is small soft tissue air/subcutaneous emphysema within the edematous distal right second toe which may correspond to a region ulcerative defect. Calcaneal spurring. Similar calcaneal osteotomy changes suspected. Mild arthritic change midfoot      Impression: 1. Soft tissue swelling and mild subcutaneous emphysema involving the soft tissues of the distal right second toe with no convincing radiographic evidence of underlying osteomyelitis. 2. Hardware loosening involving the surgical screw of the great toe with mild retraction of the surgical screw compared to 7/27/2024.  This report was signed and finalized on 4/27/2025 11:42 AM by Dr. Ml Estrada MD.      CT Head Without Contrast  Result Date: 4/26/2025  Narrative: EXAMINATION: CT HEAD WO CONTRAST-  4/26/2025 4:58 PM  HISTORY: seizures  COMPARISON: 9/21/2023  DLP: 1069.19 mGy.cm  In order to have a CT radiation dose as low as reasonably achievable, Automated Exposure Control was utilized for adjustment of the mA and/or KV according to patient size.  TECHNIQUE: Serial axial tomographic images of the brain were obtained without the use of intravenous contrast.  Coronal and sagittal reformatted images were obtained reviewed as well.  FINDINGS: No mass effect or midline shift. No acute hemorrhage. There is mild low-attenuation in the subcortical and periventricular white matter as can be seen with chronic microvascular change. Vascular calcifications in the vertebral arteries in the intracranial portions of  the carotid arteries.  Surrounding soft tissues are without acute findings. Orbits and globes are preserved. Paranasal sinus disease with an air-fluid level in the RIGHT maxillary sinus suggesting mild acute sinusitis. Mild scattered mucosal thickening in the ethmoid air cells. Small mastoid effusion on the RIGHT, unchanged from 2023 and likely chronic.       Impression:  1.  Mild atrophy and chronic white matter changes but no definite acute intracranial process.  2.  Paranasal sinus disease as discussed above.  This report was signed and finalized on 4/26/2025 5:00 PM by Dr. Valentino Lebron MD.      XR Chest 1 View  Result Date: 4/26/2025  Narrative: XR CHEST 1 VW-  HISTORY: rule out pneumonia; R56.9-Unspecified convulsions  COMPARISON: 12/26/2024  FINDINGS: Frontal view of the chest obtained.  Stable cardiomegaly with evidence of prior mediastinal surgery. Chronic mild prominence of pulmonary vascular structures with no acute consolidation. No pleural effusion or pneumothorax. No acute regional bony pathology.      Impression: 1. Prior mediastinal surgery with stable cardiomegaly and mild chronic change. No acute abnormality.   This report was signed and finalized on 4/26/2025 10:14 AM by Dr. Ml Estrada MD.      CT Angiogram Chest  Result Date: 4/9/2025  Narrative: EXAMINATION: CT ANGIOGRAM CHEST-   4/9/2025 8:42 AM  HISTORY: Ascending Aortic Ansurysm; I71.21-Aneurysm of the ascending aorta, without rupture  In order to have a CT radiation dose as low as reasonably achievable Automated Exposure Control was utilized for adjustment of the mA and/or KV according to patient size.  CT Dose DLP = 420.13 mGy.cm. (If there are multiple studies performed at the same time this represents the total dose).  Images are stored in PACS per institutional protocol.  CT angiogram chest with IV contrast injection. CT angiography protocol. CT imaging with bolus IV contrast injection. Under concurrent supervision axial,  sagittal, coronal, three-dimensional, and MIP data sets were constructed on an independent work station.  Comparison: 2/14/2024.  Heart size is at the upper limits of normal. Median sternotomy changes and coronary artery calcification. Symmetric and normally opacified pulmonary arteries. Unchanged 14 mm linear metal structure within the LEFT pulmonary artery. No mediastinal mass. Normal CT appearance of the thyroid gland.  Stable dilated ascending thoracic aorta measuring 41 x 45 mm on axial image 66. Stable 34 mm aortic root measurement on coronal image 53. Stable 38 mm diameter of the distal ascending aorta near the innominate artery origin. No dissection.  Moderate degenerative disc and endplate change throughout the thoracic spine. Multiple (approximately 10) tiny scattered lung nodules are unchanged.      Impression: 1. Stable size dilated ascending thoracic aorta. 2. No significant change from last year.  This report was signed and finalized on 4/9/2025 10:32 AM by Dr. Celso Wynn MD.        LABORATORY/CULTURE RESULTS:  Results from last 7 days   Lab Units 04/29/25  0841 04/28/25  0515 04/27/25  0409   WBC 10*3/mm3 3.35* 3.81 5.72   HEMOGLOBIN g/dL 13.5 13.7 15.2   HEMATOCRIT % 41.0 40.6 46.9   PLATELETS 10*3/mm3 79* 73* 73*     Results from last 7 days   Lab Units 04/29/25  0841 04/28/25  0515 04/27/25  0409 04/26/25  1652   SODIUM mmol/L 136 136 136 136   POTASSIUM mmol/L 4.2 4.0 4.9 4.5   CHLORIDE mmol/L 103 104 99 100   CO2 mmol/L 21.0* 21.0* 23.0 23.0   BUN mg/dL 19 20 15 15   CREATININE mg/dL 0.90 0.93 1.02 1.10   CALCIUM mg/dL 8.8 8.6 8.9 9.3   BILIRUBIN mg/dL  --  0.9 2.0* 1.6*   ALK PHOS U/L  --  59 63 64   ALT (SGPT) U/L  --  23 26 25   AST (SGOT) U/L  --  27 42* 31   GLUCOSE mg/dL 201* 140* 177* 164*         Microbiology Results (last 10 days)       Procedure Component Value - Date/Time    Blood Culture With STACY - Blood, Arm, Left [[id number]]  (Normal) Collected: 04/27/25 0850    Lab Status:  Preliminary result Specimen: Blood from Arm, Left Updated: 04/29/25 1001     Blood Culture No growth at 2 days    Blood Culture With STACY - Blood, Arm, Right [[id number]]  (Normal) Collected: 04/27/25 0850    Lab Status: Preliminary result Specimen: Blood from Arm, Right Updated: 04/29/25 1001     Blood Culture No growth at 2 days    Respiratory Panel PCR w/COVID-19(SARS-CoV-2) [facility] In-House, NP Swab in UTM/VTM, 2 HR TAT - Swab, Nasopharynx [[id number]]  (Normal) Collected: 04/26/25 0050    Lab Status: Final result Specimen: Swab from Nasopharynx Updated: 04/26/25 0145     ADENOVIRUS, PCR Not Detected     Coronavirus 229E Not Detected     Coronavirus HKU1 Not Detected     Coronavirus NL63 Not Detected     Coronavirus OC43 Not Detected     COVID19 Not Detected     Human Metapneumovirus Not Detected     Human Rhinovirus/Enterovirus Not Detected     Influenza A PCR Not Detected     Influenza B PCR Not Detected     Parainfluenza Virus 1 Not Detected     Parainfluenza Virus 2 Not Detected     Parainfluenza Virus 3 Not Detected     Parainfluenza Virus 4 Not Detected     RSV, PCR Not Detected     Bordetella pertussis pcr Not Detected     Bordetella parapertussis PCR Not Detected     Chlamydophila pneumoniae PCR Not Detected     Mycoplasma pneumo by PCR Not Detected    Narrative:      In the setting of a positive respiratory panel with a viral infection PLUS a negative procalcitonin without other underlying concern for bacterial infection, consider observing off antibiotics or discontinuation of antibiotics and continue supportive care. If the respiratory panel is positive for atypical bacterial infection (Bordetella pertussis, Chlamydophila pneumoniae, or Mycoplasma pneumoniae), consider antibiotic de-escalation to target atypical bacterial infection.            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    X-rays reviewed with patient.  Continue current wound care orders.  Continue antibiotics per hospitalist.    I believe the best course of action would be to proceed with Partial vs Complete Amputation of 2nd Toe, Hardware Removal from Hallux - Right Foot.  Patient is in agreement.  All options, benefits, and risks associate with surgery have been discussed with the patient including but not limited to: Standard risk of anesthesia, pain, bleeding, infection, nonhealing/dehiscence, deformity, loss of limb or life.  Pre and postoperative course were discussed in detail.  No guarantees were inferred.    Discussed case with Dr. Kruger who has no concerns with proceeding with surgery.  N.p.o. at midnight.  Plan for OR tomorrow afternoon.      This document has been electronically signed by Hernan Villa DPM on April 29, 2025 13:12 CDT

## 2025-04-29 NOTE — H&P (VIEW-ONLY)
[facility] - PODIATRY    Today's Date: 04/29/25     Patient Name: Carlos A Boyer Jr.  MRN: [id number]  CSN: [id number]  PCP: Vinayak Correia PA  Referring Provider: Vish Latham MD  Attending Provider: Angela Cesar MD  Length of Stay: 3    SUBJECTIVE   Chief Complaint: Right 1st and 2nd toes    HPI: Carlos A Boyer Jr., a 65 y.o.male.  Bandaging performed per nursing.  Concern for ongoing infection to the right second toe and backing out of hardware from the great toe.  Relates that he is open to surgical intervention including toe amputation if needed.      Past Medical History:   Diagnosis Date    Arthritis     Asthma     Cancer     skin    Carotid disease, bilateral     Cellulitis     right foot - on antibiotics 6-    Chest pain     Chronic diastolic congestive heart failure 09/07/2019    Chronic sinusitis     Maribell bullosa     Coronary artery disease involving native coronary artery of native heart with unstable angina pectoris 01/17/2017    Deviated septum     Diabetes mellitus     Difficulty urinating     Diverticulitis     Enlarged prostate     Fatty liver     GERD (gastroesophageal reflux disease)     [place] (hard of hearing)     Does have hearing aids    Hyperlipidemia LDL goal <70 02/02/2017    Hypertrophy of nasal turbinates     Keratoderma     Kidney stone     Lung nodules     lower right lung    Migraine     Murmur, heart     Myocardial infarction     Obesity     Paroxysmal atrial fibrillation 07/11/2019    Personal history of COVID-19 07/2021    PONV (postoperative nausea and vomiting)     Primary hypertension 10/16/2016    Psoriasis     Seizures     Sinus congestion     Skin cancer     Sleep apnea     not using cpap    SOB (shortness of breath)     Stroke     mild weakness on right side    UTI (urinary tract infection)      Past Surgical History:   Procedure Laterality Date    CARDIAC CATHETERIZATION  01/2016    Dr. Broadbent; widely patent previously placed  stents in the left anterior descending and obstructive disease involving the diagonal branch which was treated medically    CARDIAC CATHETERIZATION N/A 07/14/2017    Procedure: Left Heart Cath;  Surgeon: Wade Ramey MD;  Location:  [facility];  Service:     CARDIAC CATHETERIZATION Left 10/15/2018    Procedure: Cardiac Catheterization/Vascular Study;  Surgeon: Wade Ramey MD;  Location:  [facility];  Service: Cardiology    CARDIAC CATHETERIZATION  10/15/2018    Procedure: Functional [place];  Surgeon: Wade Ramey MD;  Location:  [facility];  Service: Cardiology    CARDIAC CATHETERIZATION N/A 10/15/2018    Procedure: Left ventriculography;  Surgeon: Wade Ramey MD;  Location:  [facility];  Service: Cardiology    CARDIAC CATHETERIZATION Left 06/26/2019    Procedure: Cardiac Catheterization/Vascular Study VEL OK  HE WILL WAIT 1 YEAR FOR SHOULDER SURGERY ;  Surgeon: Wade Ramey MD;  Location:  [facility];  Service: Cardiology    CARDIAC CATHETERIZATION Left 04/30/2021    Procedure: Coronary angiography;  Surgeon: Sahil Llamas MD;  Location:  [facility];  Service: Cardiology;  Laterality: Left;    CARDIAC CATHETERIZATION N/A 04/30/2021    Procedure: Percutaneous Coronary Intervention;  Surgeon: Sahil Llamas MD;  Location:  [facility];  Service: Cardiology;  Laterality: N/A;    CARDIAC CATHETERIZATION N/A 11/09/2022    Procedure: Left Heart Cath with SVGs;  Surgeon: Wade Ramey MD;  Location:  [facility];  Service: Cardiology;  Laterality: N/A;    CARPAL TUNNEL RELEASE      January 2024 and pther hand February 2024    CHOLECYSTECTOMY      CHOLECYSTECTOMY WITH INTRAOPERATIVE CHOLANGIOGRAM N/A 08/01/2018    Procedure: CHOLECYSTECTOMY LAPAROSCOPIC INTRAOPERATIVE CHOLANGIOGRAM;  Surgeon: Shane Ann MD;  Location: [facility] OR;  Service: General     COLONOSCOPY N/A 07/14/2020    Procedure: COLONOSCOPY WITH ANESTHESIA;  Surgeon: Anupam Morales DO;  Location: [facility] ENDOSCOPY;  Service: Gastroenterology;  Laterality: N/A;  pre: abdominal pain  post: diverticulosis  Vinayak Correia PA    CORONARY ANGIOPLASTY      CORONARY ARTERY BYPASS GRAFT N/A 07/06/2019    Procedure: CABG X2 WITH LIMA, LEFT LEG OVH, AND PLACEMENT OF LEFT FEMORAL ARTERIAL LINE;  Surgeon: Steven Tang MD;  Location:  [facility] OR;  Service: Cardiothoracic    CORONARY STENT PLACEMENT      x 6    CYSTOSCOPY TRANSURETHRAL RESECTION OF PROSTATE N/A 01/23/2023    Procedure: CYSTOSCOPY TRANSURETHRAL RESECTION OF PROSTATE;  Surgeon: Latrell Pope MD;  Location:  [facility] OR;  Service: Urology;  Laterality: N/A;    ENDOSCOPIC FUNCTIONAL SINUS SURGERY (FESS) Bilateral 12/13/2017    Procedure: PROCEDURE PERFORMED:  Bilateral functional endoscopic anterior ethmoidectomy with bilateral middle meatal antrostomy Septoplasty Right kathia bullosa resection Bilateral inferior turbinate reduction via [place];  Surgeon: Mayank Ibarra MD;  Location:  [facility] OR;  Service:     ENDOSCOPY N/A 07/30/2018    Procedure: ESOPHAGOGASTRODUODENOSCOPY WITH ANESTHESIA;  Surgeon: Benitez Mas MD;  Location: [facility] ENDOSCOPY;  Service: Gastroenterology    ENDOSCOPY N/A 07/14/2020    Procedure: ESOPHAGOGASTRODUODENOSCOPY WITH ANESTHESIA;  Surgeon: Anupam Morales DO;  Location: [facility] ENDOSCOPY;  Service: Gastroenterology;  Laterality: N/A;  pre: abdominal pain  post: esophagitis  Vinayak Correia PA    HERNIA REPAIR      x2 inguinal area    KIDNEY STONE SURGERY      KNEE ARTHROSCOPY Right 03/01/2022    Procedure: RIGHT KNEE PARTIAL LATERAL MENISCECTOMY;  Surgeon: Pedro Pablo Song MD;  Location:  [facility] OR;  Service: Orthopedics;  Laterality: Right;    KNEE SURGERY Right     OTHER SURGICAL HISTORY      urolift    PROSTATE SURGERY      Dr. Badillo - 2017    PULMONARY ARTERY PRESSURE SENSOR IMPLANT  N/A 2023    Procedure: PA Pressure Sensor Implant;  Surgeon: Wade Ramey MD;  Location:  [facility];  Service: Cardiology;  Laterality: N/A;    ROTATOR CUFF REPAIR Right     SLEEP ENDOSCOPY N/A 2023    Procedure: Videosleep endoscopy;  Surgeon: Mayank Ibarra MD;  Location:  [facility] OR;  Service: ENT;  Laterality: N/A;    THUMB AMPUTATION Left     partial    TOE FUSION Right 7/3/2024    Procedure: Hallux Interphalangeal Joint Arthrodesis, Bone Graft [place] - Right Foot;  Surgeon: Hernan Villa DPM;  Location:  [facility] OR;  Service: Podiatry;  Laterality: Right;    TOE SURGERY      great toe     Family History   Problem Relation Age of Onset    Heart disease Father     COPD Mother     Hypertension Mother     Asthma Mother     No Known Problems Sister     Colon cancer Paternal Uncle     Prostate cancer Maternal Grandfather     No Known Problems Sister     Colon cancer Maternal Grandmother     Colon polyps Maternal Grandmother      Social History     Socioeconomic History    Marital status:    Tobacco Use    Smoking status: Former     Current packs/day: 0.00     Average packs/day: 1.5 packs/day for 18.0 years (27.0 ttl pk-yrs)     Types: Cigarettes, Cigars     Start date:      Quit date:      Years since quittin.3     Passive exposure: Past    Smokeless tobacco: Former     Types: Chew     Quit date:    Vaping Use    Vaping status: Never Used   Substance and Sexual Activity    Alcohol use: No     Comment: 0    Drug use: No    Sexual activity: Defer     Allergies   Allergen Reactions    Flagyl [Metronidazole] Hives    Atorvastatin Other (See Comments) and Myalgia      - LIPITOR -   Muscle cramps    Ciprofloxacin Hives      - CIPRO -      Current Facility-Administered Medications   Medication Dose Route Frequency Provider Last Rate Last Admin    acetaminophen (TYLENOL) tablet 325 mg  325 mg Oral Q6H PRN Vish Latham MD   325 mg at 25     albuterol (PROVENTIL) nebulizer solution 0.083% 2.5 mg/3mL  2.5 mg Nebulization Q6H PRN Vish Latham MD        apixaban (ELIQUIS) tablet 5 mg  5 mg Oral BID Vish Latham MD   5 mg at 04/28/25 2028    aspirin EC tablet 81 mg  81 mg Oral Daily Vish Latham MD   81 mg at 04/29/25 0842    sennosides-docusate (PERICOLACE) 8.6-50 MG per tablet 2 tablet  2 tablet Oral BID PRN Vish Latham MD        And    polyethylene glycol (MIRALAX) packet 17 g  17 g Oral Daily PRN Vish Latham MD        And    bisacodyl (DULCOLAX) EC tablet 5 mg  5 mg Oral Daily PRN Vish Latham MD        And    bisacodyl (DULCOLAX) suppository 10 mg  10 mg Rectal Daily PRN Vish Latham MD        cefTRIAXone (ROCEPHIN) 1,000 mg in sodium chloride 0.9 % 100 mL MBP  1,000 mg Intravenous Q24H Vish Latham  mL/hr at 04/28/25 2026 1,000 mg at 04/28/25 2026    dextrose (D50W) (25 g/50 mL) IV injection 25 g  25 g Intravenous Q15 Min PRN Vish Latham MD        dextrose (GLUTOSE) oral gel 15 g  15 g Oral Q15 Min PRN Vish Latham MD        docusate sodium (COLACE) capsule 100 mg  100 mg Oral Daily PRN Vish Latham MD        empagliflozin (JARDIANCE) tablet 25 mg  25 mg Oral Daily Vish Latham MD   25 mg at 04/29/25 0842    fluticasone (FLONASE) 50 MCG/ACT nasal spray 2 spray  2 spray Nasal Daily iVsh Latham MD   2 spray at 04/29/25 0845    furosemide (LASIX) tablet 40 mg  40 mg Oral Daily PRN Vish Latham MD        gabapentin (NEURONTIN) capsule 300 mg  300 mg Oral Nightly Vish Latham MD   300 mg at 04/28/25 2028    glucagon (GLUCAGEN) injection 1 mg  1 mg Intramuscular Q15 Min PRN Vish Latham MD        guaiFENesin (MUCINEX) 12 hr tablet 1,200 mg  1,200 mg Oral Daily PRN Vish Latham MD        insulin glargine (LANTUS, SEMGLEE) injection 25 Units  25 Units Subcutaneous Q12H Vish Latham MD   25 Units at 04/29/25 0840    Insulin Lispro (humaLOG) injection 2-9 Units  2-9 Units Subcutaneous 4x Daily AC &  at Bedtime Vish Latham MD   2 Units at 04/29/25 1231    isosorbide mononitrate (IMDUR) 24 hr tablet 120 mg  120 mg Oral Daily Vish Latham MD   120 mg at 04/29/25 1011    lamoTRIgine (LaMICtal) tablet 200 mg  200 mg Oral BID Vish Latham MD   200 mg at 04/29/25 0841    levETIRAcetam (KEPPRA) tablet 1,500 mg  1,500 mg Oral Daily Vish Latham MD   1,500 mg at 04/29/25 0842    levETIRAcetam (KEPPRA) tablet 2,000 mg  2,000 mg Oral Nightly Vish Latham MD   2,000 mg at 04/28/25 2027    LORazepam (ATIVAN) injection 1 mg  1 mg Intravenous Q2H PRN Vish Latham MD        metoprolol tartrate (LOPRESSOR) tablet 100 mg  100 mg Oral BID Vish Latham MD   100 mg at 04/29/25 0841    nitroglycerin (NITROSTAT) SL tablet 0.4 mg  0.4 mg Sublingual Q5 Min PRN Vish Latham MD        ondansetron (ZOFRAN) injection 4 mg  4 mg Intravenous Q6H PRN Vish Latham MD        pantoprazole (PROTONIX) EC tablet 40 mg  40 mg Oral BID Vish Latham MD   40 mg at 04/29/25 0842    polyethylene glycol (MIRALAX) packet 17 g  17 g Oral Daily Vish Latham MD   17 g at 04/26/25 2038    rosuvastatin (CRESTOR) tablet 20 mg  20 mg Oral Nightly Vish Latham MD   20 mg at 04/28/25 2028    sacubitril-valsartan (ENTRESTO)  MG tablet 1 tablet  1 tablet Oral BID Vish Latham MD   1 tablet at 04/29/25 0842    sodium chloride 0.9 % flush 10 mL  10 mL Intravenous Q12H Vish Latham MD   10 mL at 04/29/25 0847    sodium chloride 0.9 % flush 10 mL  10 mL Intravenous PRN Vish Latham MD        sodium chloride 0.9 % infusion 40 mL  40 mL Intravenous PRN Vish Latham MD        tamsulosin (FLOMAX) 24 hr capsule 0.4 mg  0.4 mg Oral Daily Vish Latham MD   0.4 mg at 04/29/25 0841     Review of Systems   Constitutional:  Negative for chills and fever.   HENT:  Negative for congestion.    Respiratory:  Negative for shortness of breath.    Cardiovascular:  Negative for chest pain.   Gastrointestinal:  Negative for  constipation, diarrhea, nausea and vomiting.   Musculoskeletal:         Foot pain   Skin:  Positive for color change and wound.   Neurological:  Positive for seizures and numbness.   Psychiatric/Behavioral:  Negative for agitation.        OBJECTIVE     Vitals:    04/29/25 1150   BP: 111/63   Pulse: 69   Resp: 16   Temp: 97.6 °F (36.4 °C)   SpO2: 91%       PHYSICAL EXAM  GEN:   Pt presents in hospital bed. Accompanied by none.     Physical Exam  Vitals reviewed.   Constitutional:       Appearance: Normal appearance.   HENT:      Head: Normocephalic.      Right Ear: Tympanic membrane normal.      Left Ear: Tympanic membrane normal.      Nose: Nose normal.      Mouth/Throat:      Pharynx: Oropharynx is clear.   Cardiovascular:      Rate and Rhythm: Normal rate.      Pulses: Normal pulses.           Dorsalis pedis pulses are 2+ on the right side and 2+ on the left side.        Posterior tibial pulses are 2+ on the right side and 2+ on the left side.      Heart sounds: Normal heart sounds.   Pulmonary:      Effort: Pulmonary effort is normal.      Breath sounds: Normal breath sounds.   Abdominal:      General: Bowel sounds are normal.   Musculoskeletal:      Cervical back: Normal range of motion and neck supple.   Feet:      Right foot:      Skin integrity: Warmth present.      Left foot:      Skin integrity: Warmth present.   Neurological:      Mental Status: He is alert and oriented to person, place, and time.   Psychiatric:         Mood and Affect: Mood normal.         Behavior: Behavior normal.         Thought Content: Thought content normal.         Judgment: Judgment normal.          Foot/Ankle Exam    GENERAL  Appearance:  appears stated age  Orientation:  AAOx3  Affect:  appropriate    VASCULAR     Right Foot Vascularity   Dorsalis pedis:  2+  Posterior tibial:  2+  Skin temperature:  warm  Edema grading:  Trace  CFT:  3  Pedal hair growth:  Present     Left Foot Vascularity   Dorsalis pedis:  2+  Posterior  tibial:  2+  Skin temperature:  warm  Edema grading:  Trace  CFT:  3  Pedal hair growth:  Present     NEUROLOGIC     Right Foot Neurologic   Light touch sensation: diminished  Vibratory sensation: diminished  Hot/Cold sensation: diminished     Left Foot Neurologic   Light touch sensation: diminished  Vibratory sensation: diminished  Hot/Cold sensation:  diminished    MUSCULOSKELETAL     Right Foot Musculoskeletal   Tenderness:  toe 1 tenderness and toe 2 tenderness    Arch:  Normal     Left Foot Musculoskeletal   Tenderness:  none  Arch:  Normal    MUSCLE STRENGTH     Right Foot Muscle Strength   Foot dorsiflexion:  4+  Foot plantar flexion:  4+  Foot inversion:  4+  Foot eversion:  4+     Left Foot Muscle Strength   Foot dorsiflexion:  4+  Foot plantar flexion:  4+  Foot inversion:  4+  Foot eversion:  4+    RANGE OF MOTION     Right Foot Range of Motion   Foot and ankle ROM within normal limits       Left Foot Range of Motion   Foot and ankle ROM within normal limits      DERMATOLOGIC      Right Foot Dermatologic   Skin  Positive for cellulitis and gangrene.      Left Foot Dermatologic   Skin  Left foot skin is intact.      Right foot additional comments: Necrosis and malodorous changes to the distal second toe with scant purulence.           RADIOLOGY/NUCLEAR:  MRI Brain With & Without Contrast  Result Date: 4/28/2025  Narrative: MRI BRAIN W WO CONTRAST- 4/28/2025 6:20 AM  HISTORY: recurrent breakthrough seizures; G40.909-Epilepsy, unspecified, not intractable, without status epilepticus  TECHNIQUE: Multisequence, multiplanar MRI of the brain before and after the intravenous administration of Vueway contrast.  COMPARISON: Brain MRI dated 12/1/2017  FINDINGS:  No diffusion signal abnormality. No intra-axial or extra-axial hemorrhage. No intracranial mass lesion or pathologic enhancement. Moderate chronic small vessel ischemic change. Hippocampal volumes appear symmetric. No obvious hippocampal sclerosis.  Incidental tiny hippocampal cysts. Posterior fossa structures are unremarkable. Pituitary gland and sella are unremarkable. The major intracranial flow-voids are preserved. Orbital contents are unremarkable. The paranasal sinuses are clear. Partial right mastoid effusion. Normal marrow signal in the skull base and calvarium.      Impression:  1.  No acute intracranial process. 2.  No intracranial mass lesion or pathologic enhancement. 3.  No obvious hippocampal sclerosis. Hippocampal volumes and signal appear symmetric. 4.  Underlying moderate chronic small vessel ischemic change.  This report was signed and finalized on 4/28/2025 9:12 AM by Dr Merritt Reaves.      EEG  Result Date: 4/27/2025  Narrative: Reason for referral: 65 y.o.male with seizure Technical Summary:  A 19 channel digital EEG was performed using the international 10-20 placement system, including eye leads and EKG leads. Duration: 22 minutes Findings: The patient is asleep at the beginning of the study.  The background shows diffuse low amplitude theta present symmetrically over both hemispheres.  Toward the end of the study when the patient awakened there is an increase in faster theta and alpha frequencies.  A clear posterior rhythm is not seen.  Photic stimulation does not change the background.  Hyperventilation is not performed.  No focal features or epileptiform activity are present. Video: Available Technical quality: Good Rhythm strip: Regular, 60 bpm SUMMARY: Excessive drowsiness Otherwise unremarkable EEG in the mostly asleep and briefly awake states No focal features or epileptiform activity are seen     Impression: Normal study This report is transcribed using the Dragon dictation system.      XR Foot 2 View Right  Result Date: 4/27/2025  Narrative: XR FOOT 2 VW RIGHT-  HISTORY: right 2nd toe with wound and redness radiating up foot; G40.909-Epilepsy, unspecified, not intractable, without status epilepticus  COMPARISON: 7/27/2024   FINDINGS: 2 views of the right foot are obtained.  Surgical screw of the great toe from prior interphalangeal arthrodesis does demonstrate hardware loosening with mild retraction of the screw within the bony structure of the great toe. Advanced chronic arthritic changes of the first MTP and interphalangeal joint of the great toe.  There is soft tissue swelling of the distal right second toe no convincing radiographic evidence of underlying osteomyelitis. There is small soft tissue air/subcutaneous emphysema within the edematous distal right second toe which may correspond to a region ulcerative defect. Calcaneal spurring. Similar calcaneal osteotomy changes suspected. Mild arthritic change midfoot      Impression: 1. Soft tissue swelling and mild subcutaneous emphysema involving the soft tissues of the distal right second toe with no convincing radiographic evidence of underlying osteomyelitis. 2. Hardware loosening involving the surgical screw of the great toe with mild retraction of the surgical screw compared to 7/27/2024.  This report was signed and finalized on 4/27/2025 11:42 AM by Dr. Ml Estrada MD.      CT Head Without Contrast  Result Date: 4/26/2025  Narrative: EXAMINATION: CT HEAD WO CONTRAST-  4/26/2025 4:58 PM  HISTORY: seizures  COMPARISON: 9/21/2023  DLP: 1069.19 mGy.cm  In order to have a CT radiation dose as low as reasonably achievable, Automated Exposure Control was utilized for adjustment of the mA and/or KV according to patient size.  TECHNIQUE: Serial axial tomographic images of the brain were obtained without the use of intravenous contrast.  Coronal and sagittal reformatted images were obtained reviewed as well.  FINDINGS: No mass effect or midline shift. No acute hemorrhage. There is mild low-attenuation in the subcortical and periventricular white matter as can be seen with chronic microvascular change. Vascular calcifications in the vertebral arteries in the intracranial portions of  the carotid arteries.  Surrounding soft tissues are without acute findings. Orbits and globes are preserved. Paranasal sinus disease with an air-fluid level in the RIGHT maxillary sinus suggesting mild acute sinusitis. Mild scattered mucosal thickening in the ethmoid air cells. Small mastoid effusion on the RIGHT, unchanged from 2023 and likely chronic.       Impression:  1.  Mild atrophy and chronic white matter changes but no definite acute intracranial process.  2.  Paranasal sinus disease as discussed above.  This report was signed and finalized on 4/26/2025 5:00 PM by Dr. Valentino Lebron MD.      XR Chest 1 View  Result Date: 4/26/2025  Narrative: XR CHEST 1 VW-  HISTORY: rule out pneumonia; R56.9-Unspecified convulsions  COMPARISON: 12/26/2024  FINDINGS: Frontal view of the chest obtained.  Stable cardiomegaly with evidence of prior mediastinal surgery. Chronic mild prominence of pulmonary vascular structures with no acute consolidation. No pleural effusion or pneumothorax. No acute regional bony pathology.      Impression: 1. Prior mediastinal surgery with stable cardiomegaly and mild chronic change. No acute abnormality.   This report was signed and finalized on 4/26/2025 10:14 AM by Dr. Ml Estrada MD.      CT Angiogram Chest  Result Date: 4/9/2025  Narrative: EXAMINATION: CT ANGIOGRAM CHEST-   4/9/2025 8:42 AM  HISTORY: Ascending Aortic Ansurysm; I71.21-Aneurysm of the ascending aorta, without rupture  In order to have a CT radiation dose as low as reasonably achievable Automated Exposure Control was utilized for adjustment of the mA and/or KV according to patient size.  CT Dose DLP = 420.13 mGy.cm. (If there are multiple studies performed at the same time this represents the total dose).  Images are stored in PACS per institutional protocol.  CT angiogram chest with IV contrast injection. CT angiography protocol. CT imaging with bolus IV contrast injection. Under concurrent supervision axial,  sagittal, coronal, three-dimensional, and MIP data sets were constructed on an independent work station.  Comparison: 2/14/2024.  Heart size is at the upper limits of normal. Median sternotomy changes and coronary artery calcification. Symmetric and normally opacified pulmonary arteries. Unchanged 14 mm linear metal structure within the LEFT pulmonary artery. No mediastinal mass. Normal CT appearance of the thyroid gland.  Stable dilated ascending thoracic aorta measuring 41 x 45 mm on axial image 66. Stable 34 mm aortic root measurement on coronal image 53. Stable 38 mm diameter of the distal ascending aorta near the innominate artery origin. No dissection.  Moderate degenerative disc and endplate change throughout the thoracic spine. Multiple (approximately 10) tiny scattered lung nodules are unchanged.      Impression: 1. Stable size dilated ascending thoracic aorta. 2. No significant change from last year.  This report was signed and finalized on 4/9/2025 10:32 AM by Dr. Celso Wynn MD.        LABORATORY/CULTURE RESULTS:  Results from last 7 days   Lab Units 04/29/25  0841 04/28/25  0515 04/27/25  0409   WBC 10*3/mm3 3.35* 3.81 5.72   HEMOGLOBIN g/dL 13.5 13.7 15.2   HEMATOCRIT % 41.0 40.6 46.9   PLATELETS 10*3/mm3 79* 73* 73*     Results from last 7 days   Lab Units 04/29/25  0841 04/28/25  0515 04/27/25  0409 04/26/25  1652   SODIUM mmol/L 136 136 136 136   POTASSIUM mmol/L 4.2 4.0 4.9 4.5   CHLORIDE mmol/L 103 104 99 100   CO2 mmol/L 21.0* 21.0* 23.0 23.0   BUN mg/dL 19 20 15 15   CREATININE mg/dL 0.90 0.93 1.02 1.10   CALCIUM mg/dL 8.8 8.6 8.9 9.3   BILIRUBIN mg/dL  --  0.9 2.0* 1.6*   ALK PHOS U/L  --  59 63 64   ALT (SGPT) U/L  --  23 26 25   AST (SGOT) U/L  --  27 42* 31   GLUCOSE mg/dL 201* 140* 177* 164*         Microbiology Results (last 10 days)       Procedure Component Value - Date/Time    Blood Culture With STACY - Blood, Arm, Left [[id number]]  (Normal) Collected: 04/27/25 0850    Lab Status:  Preliminary result Specimen: Blood from Arm, Left Updated: 04/29/25 1001     Blood Culture No growth at 2 days    Blood Culture With STACY - Blood, Arm, Right [[id number]]  (Normal) Collected: 04/27/25 0850    Lab Status: Preliminary result Specimen: Blood from Arm, Right Updated: 04/29/25 1001     Blood Culture No growth at 2 days    Respiratory Panel PCR w/COVID-19(SARS-CoV-2) [facility] In-House, NP Swab in UTM/VTM, 2 HR TAT - Swab, Nasopharynx [[id number]]  (Normal) Collected: 04/26/25 0050    Lab Status: Final result Specimen: Swab from Nasopharynx Updated: 04/26/25 0145     ADENOVIRUS, PCR Not Detected     Coronavirus 229E Not Detected     Coronavirus HKU1 Not Detected     Coronavirus NL63 Not Detected     Coronavirus OC43 Not Detected     COVID19 Not Detected     Human Metapneumovirus Not Detected     Human Rhinovirus/Enterovirus Not Detected     Influenza A PCR Not Detected     Influenza B PCR Not Detected     Parainfluenza Virus 1 Not Detected     Parainfluenza Virus 2 Not Detected     Parainfluenza Virus 3 Not Detected     Parainfluenza Virus 4 Not Detected     RSV, PCR Not Detected     Bordetella pertussis pcr Not Detected     Bordetella parapertussis PCR Not Detected     Chlamydophila pneumoniae PCR Not Detected     Mycoplasma pneumo by PCR Not Detected    Narrative:      In the setting of a positive respiratory panel with a viral infection PLUS a negative procalcitonin without other underlying concern for bacterial infection, consider observing off antibiotics or discontinuation of antibiotics and continue supportive care. If the respiratory panel is positive for atypical bacterial infection (Bordetella pertussis, Chlamydophila pneumoniae, or Mycoplasma pneumoniae), consider antibiotic de-escalation to target atypical bacterial infection.            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    X-rays reviewed with patient.  Continue current wound care orders.  Continue antibiotics per hospitalist.    I believe the best course of action would be to proceed with Partial vs Complete Amputation of 2nd Toe, Hardware Removal from Hallux - Right Foot.  Patient is in agreement.  All options, benefits, and risks associate with surgery have been discussed with the patient including but not limited to: Standard risk of anesthesia, pain, bleeding, infection, nonhealing/dehiscence, deformity, loss of limb or life.  Pre and postoperative course were discussed in detail.  No guarantees were inferred.    Discussed case with Dr. Kruger who has no concerns with proceeding with surgery.  N.p.o. at midnight.  Plan for OR tomorrow afternoon.      This document has been electronically signed by Hernan Villa DPM on April 29, 2025 13:12 CDT

## 2025-04-29 NOTE — PROGRESS NOTES
Neurology Progress Note      Chief Complaint:  seizure  Length of Stay:  3   Subjective     Subjective:  4/19: lying in bed. No visitors present. Patient tells me plans for toe amputation on right foot scheduled later this week as well has hardware removed from right great toe. No seizures reported or observed. Tick panel, Keppra, and Lamictal levels pending. Afebrile.    4/28:No seizures overnight.  Still complains of toe pain on the right foot but says it is looking better.  Antibiotics are currently going and has been afebrile since 4/26.  Tick panels remain pending    Medications:  Current Facility-Administered Medications   Medication Dose Route Frequency Provider Last Rate Last Admin    acetaminophen (TYLENOL) tablet 325 mg  325 mg Oral Q6H PRN Vish Latham MD   325 mg at 04/26/25 2038    albuterol (PROVENTIL) nebulizer solution 0.083% 2.5 mg/3mL  2.5 mg Nebulization Q6H PRN Vish Latham MD        apixaban (ELIQUIS) tablet 5 mg  5 mg Oral BID Vish Latham MD   5 mg at 04/28/25 2028    aspirin EC tablet 81 mg  81 mg Oral Daily Vish Latham MD   81 mg at 04/29/25 0842    sennosides-docusate (PERICOLACE) 8.6-50 MG per tablet 2 tablet  2 tablet Oral BID PRN Vish Latham MD        And    polyethylene glycol (MIRALAX) packet 17 g  17 g Oral Daily PRN Vish Latham MD        And    bisacodyl (DULCOLAX) EC tablet 5 mg  5 mg Oral Daily PRN Vish Latham MD        And    bisacodyl (DULCOLAX) suppository 10 mg  10 mg Rectal Daily PRN Vish Latham MD        cefTRIAXone (ROCEPHIN) 1,000 mg in sodium chloride 0.9 % 100 mL MBP  1,000 mg Intravenous Q24H Vish Latham  mL/hr at 04/28/25 2026 1,000 mg at 04/28/25 2026    dextrose (D50W) (25 g/50 mL) IV injection 25 g  25 g Intravenous Q15 Min PRN Vish Latham MD        dextrose (GLUTOSE) oral gel 15 g  15 g Oral Q15 Min PRN Vish Latham MD        docusate sodium (COLACE) capsule 100 mg  100 mg Oral Daily PRN Vish Latham MD         empagliflozin (JARDIANCE) tablet 25 mg  25 mg Oral Daily Vish Latham MD   25 mg at 04/29/25 0842    fluticasone (FLONASE) 50 MCG/ACT nasal spray 2 spray  2 spray Nasal Daily Vish Latham MD   2 spray at 04/29/25 0845    furosemide (LASIX) tablet 40 mg  40 mg Oral Daily PRN Vish Latham MD        gabapentin (NEURONTIN) capsule 300 mg  300 mg Oral Nightly Vish Latham MD   300 mg at 04/28/25 2028    glucagon (GLUCAGEN) injection 1 mg  1 mg Intramuscular Q15 Min PRN Vish Latham MD        guaiFENesin (MUCINEX) 12 hr tablet 1,200 mg  1,200 mg Oral Daily PRN Vish Latham MD        insulin glargine (LANTUS, SEMGLEE) injection 25 Units  25 Units Subcutaneous Q12H Vish Latham MD   25 Units at 04/29/25 0840    Insulin Lispro (humaLOG) injection 2-9 Units  2-9 Units Subcutaneous 4x Daily AC & at Bedtime Vish Latham MD   2 Units at 04/29/25 0840    isosorbide mononitrate (IMDUR) 24 hr tablet 120 mg  120 mg Oral Daily Vish Latham MD   120 mg at 04/29/25 1011    lamoTRIgine (LaMICtal) tablet 200 mg  200 mg Oral BID Vish Latham MD   200 mg at 04/29/25 0841    levETIRAcetam (KEPPRA) tablet 1,500 mg  1,500 mg Oral Daily Vish Latham MD   1,500 mg at 04/29/25 0842    levETIRAcetam (KEPPRA) tablet 2,000 mg  2,000 mg Oral Nightly Vish Latham MD   2,000 mg at 04/28/25 2027    LORazepam (ATIVAN) injection 1 mg  1 mg Intravenous Q2H PRN Vish Latham MD        metoprolol tartrate (LOPRESSOR) tablet 100 mg  100 mg Oral BID Vish Latham MD   100 mg at 04/29/25 0841    nitroglycerin (NITROSTAT) SL tablet 0.4 mg  0.4 mg Sublingual Q5 Min PRN Vish Latham MD        ondansetron (ZOFRAN) injection 4 mg  4 mg Intravenous Q6H PRN Vish Latham MD        pantoprazole (PROTONIX) EC tablet 40 mg  40 mg Oral BID Vish Latham MD   40 mg at 04/29/25 0842    polyethylene glycol (MIRALAX) packet 17 g  17 g Oral Daily Vish Latham MD   17 g at 04/26/25 2038    rosuvastatin (CRESTOR) tablet 20 mg   20 mg Oral Nightly Vish Latham MD   20 mg at 04/28/25 2028    sacubitril-valsartan (ENTRESTO)  MG tablet 1 tablet  1 tablet Oral BID Vish Latham MD   1 tablet at 04/29/25 0842    sodium chloride 0.9 % flush 10 mL  10 mL Intravenous Q12H Vish Latham MD   10 mL at 04/29/25 0847    sodium chloride 0.9 % flush 10 mL  10 mL Intravenous PRN Vish Latham MD        sodium chloride 0.9 % infusion 40 mL  40 mL Intravenous PRN Vish Latham MD        tamsulosin (FLOMAX) 24 hr capsule 0.4 mg  0.4 mg Oral Daily Vish Latham MD   0.4 mg at 04/29/25 0841             Objective      Vital Signs  Temp:  [97.5 °F (36.4 °C)-98.3 °F (36.8 °C)] 97.5 °F (36.4 °C)  Heart Rate:  [51-64] 51  Resp:  [16-18] 16  BP: (114-126)/(64-74) 126/71    Physical Exam:    HEENT:  Neck is supple  CVS:  RRR  Lungs:  CTA - B/L  Abd:  NT/ND  Ext:  Right foot somewhat red and 3rd toe is discolored.  Skin:  No rashes    Pertinent Neuro Exam:  Neuro exam non-focal  No weakness  No tremors     Results Review:      Labs:  CBC:  Plt 73 (chronically low)  Hemoglobin A1c is 7.6% up from 7  Lipid panel shows an LDL of 46  TSH normal at 0.6  Blood cultures show no growth at 24 hours  Ehrlichia panel pending  Rickettsia panel pending  Alpha gal pending  Lyme disease pending  Keppra level pending  Lamictal level pending    Imaging:  MR Brain with and without on 4/27:  Normal  EEG 4/27:  SUMMARY:     Excessive drowsiness     Otherwise unremarkable EEG in the mostly asleep and briefly awake states     No focal features or epileptiform activity are seen     IMPRESSION:     Normal study    Assessment/Plan     Hospital Problem List      Recurrent seizures    Type 2 diabetes mellitus with circulatory disorder, with long-term current use of insulin    Gastroesophageal reflux disease without esophagitis    Benign non-nodular prostatic hyperplasia with lower urinary tract symptoms    Deviated nasal septum    Maribell bullosa    Diabetic complication     Diabetic peripheral neuropathy    Class 1 obesity due to excess calories with serious comorbidity and body mass index (BMI) of 34.0 to 34.9 in adult    Atrial flutter    Chronic diastolic congestive heart failure with preserved ejection fraction    S/P CABG x 2    Presence of CardioMEMS HF system    Chronic heart failure with preserved ejection fraction (HFpEF)    Impression:  Epilepsy well-controlled for the last 3 years on the same doses of antiepileptics.  I am inclined to believe this is a provoked seizure from an underlying infection.  He has a component of cellulitis on the right toe and foot which seems to be improving with antibiotics.  Cultures remain pending.  He has remained on the same antiepileptics as his home meds and has had no further seizure activity.  Tick bite panel remains pending as well.  Keppra and Lamictal levels are pending.  I think is reasonable to continue his current Lamictal and Keppra with no changes.    Plan:  Follow-up Keppra and Lamictal levels  Follow-up tick panels as above  Continue Keppra 1500 mg in the morning and 2000 mg at night (home dose) and Lamictal 200 mg twice daily and Neurontin 3 mg nightly  Seizure precautions  Neurology will sign off.  F/u neurology in 6-8 weeks.       Medical Decision Making    Number/Complexity of Problems  Moderate  1 undiagnosed new problem with uncertain prognosis -   1 acute illness with systemic symptoms -   High  1 acute or chronic illness that poses a threat to life/body function -   High    MDM Data  Moderate - 1/3 categories  Extensive - 2/3 categories    Category 1: 3 of the following  Review of external notes  Review of results  Ordering of each unique test  Independent historian  Category 2:  Independent interpretation of test (ex: imaging)  Category 3:  Discussion of management with another provider    Extensive     Treatment Plan  Moderate - Prescription Drug management  High  Drug therapy requiring intensive monitoring for  toxicity  Decision regarding hospitalization or escalation of care  De-escalate care/DNR decisions  High - discussed with medical team       Usha Hammer, APRN  04/29/25  12:23 CDT

## 2025-04-29 NOTE — PROGRESS NOTES
[facility]  INPATIENT PROGRESS NOTE    Patient Name: Carlos A Boyer Jr.  Date of Admission: 4/26/2025  Today's Date: 04/29/25  Length of Stay: 3  Primary Care Physician: Vinayak Correia PA    Subjective   Chief Complaint: Right foot pain    No acute events overnight.  He is seen sitting on recliner, still experiences some right foot pain.     Review of Systems   All pertinent negatives and positives are as above. All other systems have been reviewed and are negative unless otherwise stated.     Objective    Temp:  [97.5 °F (36.4 °C)-98.3 °F (36.8 °C)] 97.5 °F (36.4 °C)  Heart Rate:  [51-64] 51  Resp:  [16-18] 16  BP: (114-126)/(64-96) 126/71  Physical Exam  GEN: Awake, alert, interactive, in NAD on room air   HEENT: Atraumatic, EOMI, Anicteric  Lungs: CTAB  Heart: RRR  ABD: soft, nt/nd, +BS, no guarding/rebound  Extremities: right second digit wound   Skin: no rashes or lesions  Neuro: AAOx3, no focal deficits  Psych: normal mood & affect      Results Review:  I have reviewed the labs, radiology results, and diagnostic studies.    Laboratory Data:   Results from last 7 days   Lab Units 04/28/25  0515 04/27/25  0409 04/26/25  1652   WBC 10*3/mm3 3.81 5.72 7.83   HEMOGLOBIN g/dL 13.7 15.2 14.5   HEMATOCRIT % 40.6 46.9 43.9   PLATELETS 10*3/mm3 73* 73* 87*        Results from last 7 days   Lab Units 04/28/25  0515 04/27/25  0409 04/26/25  1652   SODIUM mmol/L 136 136 136   POTASSIUM mmol/L 4.0 4.9 4.5   CHLORIDE mmol/L 104 99 100   CO2 mmol/L 21.0* 23.0 23.0   BUN mg/dL 20 15 15   CREATININE mg/dL 0.93 1.02 1.10   CALCIUM mg/dL 8.6 8.9 9.3   BILIRUBIN mg/dL 0.9 2.0* 1.6*   ALK PHOS U/L 59 63 64   ALT (SGPT) U/L 23 26 25   AST (SGOT) U/L 27 42* 31   GLUCOSE mg/dL 140* 177* 164*       Culture Data:   @[facility]    Radiology Data:   Imaging Results (Last 24 Hours)       Procedure Component Value Units Date/Time    MRI Brain With & Without Contrast [[id number]]  Collected: 04/28/25 0905     Updated: 04/28/25 0915    Narrative:      MRI BRAIN W WO CONTRAST- 4/28/2025 6:20 AM     HISTORY: recurrent breakthrough seizures; G40.909-Epilepsy, unspecified,  not intractable, without status epilepticus     TECHNIQUE: Multisequence, multiplanar MRI of the brain before and after  the intravenous administration of Vueway contrast.     COMPARISON: Brain MRI dated 12/1/2017     FINDINGS:     No diffusion signal abnormality. No intra-axial or extra-axial  hemorrhage. No intracranial mass lesion or pathologic enhancement.  Moderate chronic small vessel ischemic change. Hippocampal volumes  appear symmetric. No obvious hippocampal sclerosis. Incidental tiny  hippocampal cysts. Posterior fossa structures are unremarkable.  Pituitary gland and sella are unremarkable. The major intracranial  flow-voids are preserved. Orbital contents are unremarkable. The  paranasal sinuses are clear. Partial right mastoid effusion. Normal  marrow signal in the skull base and calvarium.       Impression:         1.  No acute intracranial process.  2.  No intracranial mass lesion or pathologic enhancement.  3.  No obvious hippocampal sclerosis. Hippocampal volumes and signal  appear symmetric.  4.  Underlying moderate chronic small vessel ischemic change.     This report was signed and finalized on 4/28/2025 9:12 AM by Dr Merritt Reaves.               I have reviewed the patient's current medications.     Assessment/Plan   Assessment  Active Hospital Problems    Diagnosis     **Recurrent seizures     Chronic heart failure with preserved ejection fraction (HFpEF)     Presence of CardioMEMS HF system     S/P CABG x 2     Chronic diastolic congestive heart failure with preserved ejection fraction     Atrial flutter     Class 1 obesity due to excess calories with serious comorbidity and body mass index (BMI) of 34.0 to 34.9 in adult     Diabetic peripheral neuropathy     Diabetic complication     Deviated nasal  septum     Maribell bullosa     Benign non-nodular prostatic hyperplasia with lower urinary tract symptoms     Gastroesophageal reflux disease without esophagitis     Type 2 diabetes mellitus with circulatory disorder, with long-term current use of insulin        Treatment Plan    # Recurrent seizures  Reported 5 seizures at home, adherence to meds  Previously, last breakthrough seizure was > 3 years ago  CT Head: Mild atrophy and chronic white matter changes but no definite acute intracranial process. Paranasal sinus disease with an air-fluid level in the right maxillary sinus suggesting mild acute sinusitis. Mild scattered mucosal thickening in the ethmoid air cells.  MRI brain: No acute findings.  - Continue levetiracetam and lamotrigine  - Neurology following --pending further workup including tick panel  - seizure precautions     # Right Foot Infection  Tmax 101.5F on 4/26  Foot x-ray showing soft tissue swelling, mild emphysema and hardware loosening.    - Continue Rocephin day 4, started empirically on admission  - Follow-up blood cultures, no growth at 24 hours  - podiatry planning toe amputation likely tomorrow 4/30/25    # HFpEF s/p cardioMEMS 5/2023  # CAD s/p CABG  # Atrial flutter - on Eliquis   -Continue Imdur, statin, Lopressor, and Eliquis    # HOA - on CPAP    # Type II Diabetes with Neuropathy - HgbA1c 7.6%  - Continue Lantus 25 every 12 and SSI  - Continue Jardiance gabapentin    # Class I Obesity - Body mass index is 32.94 kg/m².     DVT prophylaxis: Home Eliquis  GI prophylaxis: Protonix    Medical Decision Making  Number and Complexity of problems: 1 acute moderate complexity, 1 acute on chronic moderate complexity, others chronic and stable   Differential Diagnosis: As above    Conditions and Status        Stable. Improving.      [facility] Data  External documents reviewed: Reviewed  Cardiac tracing (EKG, telemetry) interpretation: Reviewed  Radiology interpretation: Reviewed  Labs reviewed: As  above  Any tests that were considered but not ordered: None     Decision rules/scores evaluated (example [id number], Wells, etc): None     Discussed with: Patient and RN     Care Planning  Shared decision making: Patient apprised of current labs, vitals, imaging and treatment plan.  They are agreeable with proceeding with plans as discussed.   Code status and discussions: Full code    Disposition  Social Determinants of Health that impact treatment or disposition: None  I expect the patient to be discharged to TBD in TBD days.         Electronically signed by Angela Cesar MD, 04/29/25, 08:09 CDT.

## 2025-04-30 ENCOUNTER — ANESTHESIA EVENT (OUTPATIENT)
Dept: PERIOP | Facility: HOSPITAL | Age: 65
End: 2025-04-30
Payer: OTHER GOVERNMENT

## 2025-04-30 ENCOUNTER — APPOINTMENT (OUTPATIENT)
Dept: GENERAL RADIOLOGY | Facility: HOSPITAL | Age: 65
End: 2025-04-30
Payer: OTHER GOVERNMENT

## 2025-04-30 ENCOUNTER — ANESTHESIA (OUTPATIENT)
Dept: PERIOP | Facility: HOSPITAL | Age: 65
End: 2025-04-30
Payer: OTHER GOVERNMENT

## 2025-04-30 LAB
ANION GAP SERPL CALCULATED.3IONS-SCNC: 12 MMOL/L (ref 5–15)
BUN SERPL-MCNC: 21 MG/DL (ref 8–23)
BUN/CREAT SERPL: 24.1 (ref 7–25)
CALCIUM SPEC-SCNC: 8.8 MG/DL (ref 8.6–10.5)
CHLORIDE SERPL-SCNC: 104 MMOL/L (ref 98–107)
CO2 SERPL-SCNC: 22 MMOL/L (ref 22–29)
CREAT SERPL-MCNC: 0.87 MG/DL (ref 0.76–1.27)
DEPRECATED RDW RBC AUTO: 44.6 FL (ref 37–54)
EGFRCR SERPLBLD CKD-EPI 2021: 94.6 ML/MIN/1.73
ERYTHROCYTE [DISTWIDTH] IN BLOOD BY AUTOMATED COUNT: 13.2 % (ref 12.3–15.4)
GLUCOSE BLDC GLUCOMTR-MCNC: 128 MG/DL (ref 70–130)
GLUCOSE BLDC GLUCOMTR-MCNC: 147 MG/DL (ref 70–130)
GLUCOSE BLDC GLUCOMTR-MCNC: 166 MG/DL (ref 70–130)
GLUCOSE BLDC GLUCOMTR-MCNC: 198 MG/DL (ref 70–130)
GLUCOSE BLDC GLUCOMTR-MCNC: 269 MG/DL (ref 70–130)
GLUCOSE SERPL-MCNC: 213 MG/DL (ref 65–99)
HCT VFR BLD AUTO: 39.8 % (ref 37.5–51)
HGB BLD-MCNC: 13.2 G/DL (ref 13–17.7)
LAMOTRIGINE SERPL-MCNC: 5.7 UG/ML (ref 2–20)
LEVETIRACETAM SERPL-MCNC: 20.6 UG/ML (ref 10–40)
MCH RBC QN AUTO: 30.5 PG (ref 26.6–33)
MCHC RBC AUTO-ENTMCNC: 33.2 G/DL (ref 31.5–35.7)
MCV RBC AUTO: 91.9 FL (ref 79–97)
PLATELET # BLD AUTO: 85 10*3/MM3 (ref 140–450)
PMV BLD AUTO: 11.7 FL (ref 6–12)
POTASSIUM SERPL-SCNC: 4.6 MMOL/L (ref 3.5–5.2)
RBC # BLD AUTO: 4.33 10*6/MM3 (ref 4.14–5.8)
SODIUM SERPL-SCNC: 138 MMOL/L (ref 136–145)
WBC NRBC COR # BLD AUTO: 3.91 10*3/MM3 (ref 3.4–10.8)

## 2025-04-30 PROCEDURE — 63710000001 INSULIN LISPRO (HUMAN) PER 5 UNITS: Performed by: PODIATRIST

## 2025-04-30 PROCEDURE — 28825 PARTIAL AMPUTATION OF TOE: CPT | Performed by: PODIATRIST

## 2025-04-30 PROCEDURE — 85027 COMPLETE CBC AUTOMATED: CPT | Performed by: STUDENT IN AN ORGANIZED HEALTH CARE EDUCATION/TRAINING PROGRAM

## 2025-04-30 PROCEDURE — 88305 TISSUE EXAM BY PATHOLOGIST: CPT | Performed by: PODIATRIST

## 2025-04-30 PROCEDURE — 25810000003 LACTATED RINGERS PER 1000 ML: Performed by: PODIATRIST

## 2025-04-30 PROCEDURE — 25010000002 PROPOFOL 10 MG/ML EMULSION: Performed by: NURSE ANESTHETIST, CERTIFIED REGISTERED

## 2025-04-30 PROCEDURE — 25010000002 CEFAZOLIN PER 500 MG: Performed by: NURSE ANESTHETIST, CERTIFIED REGISTERED

## 2025-04-30 PROCEDURE — 82948 REAGENT STRIP/BLOOD GLUCOSE: CPT

## 2025-04-30 PROCEDURE — 80048 BASIC METABOLIC PNL TOTAL CA: CPT | Performed by: STUDENT IN AN ORGANIZED HEALTH CARE EDUCATION/TRAINING PROGRAM

## 2025-04-30 PROCEDURE — 25010000002 LIDOCAINE PF 2% 2 % SOLUTION: Performed by: NURSE ANESTHETIST, CERTIFIED REGISTERED

## 2025-04-30 PROCEDURE — 63710000001 INSULIN GLARGINE PER 5 UNITS: Performed by: PODIATRIST

## 2025-04-30 PROCEDURE — 25010000002 FENTANYL CITRATE (PF) 100 MCG/2ML SOLUTION: Performed by: NURSE ANESTHETIST, CERTIFIED REGISTERED

## 2025-04-30 PROCEDURE — 88311 DECALCIFY TISSUE: CPT | Performed by: PODIATRIST

## 2025-04-30 PROCEDURE — 0QPQ04Z REMOVAL OF INTERNAL FIXATION DEVICE FROM RIGHT TOE PHALANX, OPEN APPROACH: ICD-10-PCS | Performed by: PODIATRIST

## 2025-04-30 PROCEDURE — 25010000002 CEFTRIAXONE PER 250 MG: Performed by: PODIATRIST

## 2025-04-30 PROCEDURE — 73660 X-RAY EXAM OF TOE(S): CPT

## 2025-04-30 PROCEDURE — 25010000002 BUPIVACAINE 0.5 % SOLUTION: Performed by: PODIATRIST

## 2025-04-30 PROCEDURE — 0Y6R0Z0 DETACHMENT AT RIGHT 2ND TOE, COMPLETE, OPEN APPROACH: ICD-10-PCS | Performed by: PODIATRIST

## 2025-04-30 PROCEDURE — 20680 REMOVAL OF IMPLANT DEEP: CPT | Performed by: PODIATRIST

## 2025-04-30 RX ORDER — OXYCODONE AND ACETAMINOPHEN 10; 325 MG/1; MG/1
1 TABLET ORAL EVERY 4 HOURS PRN
Status: DISCONTINUED | OUTPATIENT
Start: 2025-04-30 | End: 2025-04-30 | Stop reason: HOSPADM

## 2025-04-30 RX ORDER — FLUMAZENIL 0.1 MG/ML
0.2 INJECTION INTRAVENOUS AS NEEDED
Status: DISCONTINUED | OUTPATIENT
Start: 2025-04-30 | End: 2025-04-30 | Stop reason: HOSPADM

## 2025-04-30 RX ORDER — SODIUM CHLORIDE 0.9 % (FLUSH) 0.9 %
3 SYRINGE (ML) INJECTION AS NEEDED
Status: DISCONTINUED | OUTPATIENT
Start: 2025-04-30 | End: 2025-04-30 | Stop reason: HOSPADM

## 2025-04-30 RX ORDER — SODIUM CHLORIDE, SODIUM LACTATE, POTASSIUM CHLORIDE, CALCIUM CHLORIDE 600; 310; 30; 20 MG/100ML; MG/100ML; MG/100ML; MG/100ML
100 INJECTION, SOLUTION INTRAVENOUS CONTINUOUS
Status: DISCONTINUED | OUTPATIENT
Start: 2025-04-30 | End: 2025-04-30

## 2025-04-30 RX ORDER — BUPIVACAINE HYDROCHLORIDE 5 MG/ML
INJECTION, SOLUTION PERINEURAL AS NEEDED
Status: DISCONTINUED | OUTPATIENT
Start: 2025-04-30 | End: 2025-04-30 | Stop reason: HOSPADM

## 2025-04-30 RX ORDER — LIDOCAINE HYDROCHLORIDE 20 MG/ML
INJECTION, SOLUTION EPIDURAL; INFILTRATION; INTRACAUDAL; PERINEURAL AS NEEDED
Status: DISCONTINUED | OUTPATIENT
Start: 2025-04-30 | End: 2025-04-30 | Stop reason: SURG

## 2025-04-30 RX ORDER — NALOXONE HCL 0.4 MG/ML
0.04 VIAL (ML) INJECTION AS NEEDED
Status: DISCONTINUED | OUTPATIENT
Start: 2025-04-30 | End: 2025-04-30 | Stop reason: HOSPADM

## 2025-04-30 RX ORDER — LIDOCAINE HYDROCHLORIDE 10 MG/ML
0.5 INJECTION, SOLUTION EPIDURAL; INFILTRATION; INTRACAUDAL; PERINEURAL ONCE AS NEEDED
Status: DISCONTINUED | OUTPATIENT
Start: 2025-04-30 | End: 2025-04-30 | Stop reason: HOSPADM

## 2025-04-30 RX ORDER — SODIUM CHLORIDE 0.9 % (FLUSH) 0.9 %
3-10 SYRINGE (ML) INJECTION AS NEEDED
Status: DISCONTINUED | OUTPATIENT
Start: 2025-04-30 | End: 2025-04-30 | Stop reason: HOSPADM

## 2025-04-30 RX ORDER — FENTANYL CITRATE 50 UG/ML
50 INJECTION, SOLUTION INTRAMUSCULAR; INTRAVENOUS
Status: DISCONTINUED | OUTPATIENT
Start: 2025-04-30 | End: 2025-04-30 | Stop reason: HOSPADM

## 2025-04-30 RX ORDER — SODIUM CHLORIDE, SODIUM LACTATE, POTASSIUM CHLORIDE, CALCIUM CHLORIDE 600; 310; 30; 20 MG/100ML; MG/100ML; MG/100ML; MG/100ML
1000 INJECTION, SOLUTION INTRAVENOUS CONTINUOUS
Status: DISCONTINUED | OUTPATIENT
Start: 2025-04-30 | End: 2025-04-30

## 2025-04-30 RX ORDER — INSULIN LISPRO 100 [IU]/ML
3-14 INJECTION, SOLUTION INTRAVENOUS; SUBCUTANEOUS
Status: DISCONTINUED | OUTPATIENT
Start: 2025-04-30 | End: 2025-05-02 | Stop reason: HOSPADM

## 2025-04-30 RX ORDER — MAGNESIUM HYDROXIDE 1200 MG/15ML
LIQUID ORAL AS NEEDED
Status: DISCONTINUED | OUTPATIENT
Start: 2025-04-30 | End: 2025-04-30 | Stop reason: HOSPADM

## 2025-04-30 RX ORDER — CEFAZOLIN SODIUM 1 G/3ML
INJECTION, POWDER, FOR SOLUTION INTRAMUSCULAR; INTRAVENOUS AS NEEDED
Status: DISCONTINUED | OUTPATIENT
Start: 2025-04-30 | End: 2025-04-30 | Stop reason: SURG

## 2025-04-30 RX ORDER — SODIUM CHLORIDE 0.9 % (FLUSH) 0.9 %
3 SYRINGE (ML) INJECTION EVERY 12 HOURS SCHEDULED
Status: DISCONTINUED | OUTPATIENT
Start: 2025-04-30 | End: 2025-04-30 | Stop reason: HOSPADM

## 2025-04-30 RX ORDER — SODIUM CHLORIDE 9 MG/ML
40 INJECTION, SOLUTION INTRAVENOUS AS NEEDED
Status: DISCONTINUED | OUTPATIENT
Start: 2025-04-30 | End: 2025-04-30 | Stop reason: HOSPADM

## 2025-04-30 RX ORDER — ONDANSETRON 2 MG/ML
4 INJECTION INTRAMUSCULAR; INTRAVENOUS
Status: DISCONTINUED | OUTPATIENT
Start: 2025-04-30 | End: 2025-04-30 | Stop reason: HOSPADM

## 2025-04-30 RX ORDER — PROPOFOL 10 MG/ML
VIAL (ML) INTRAVENOUS AS NEEDED
Status: DISCONTINUED | OUTPATIENT
Start: 2025-04-30 | End: 2025-04-30 | Stop reason: SURG

## 2025-04-30 RX ORDER — PHENYLEPHRINE HCL IN 0.9% NACL 1 MG/10 ML
SYRINGE (ML) INTRAVENOUS AS NEEDED
Status: DISCONTINUED | OUTPATIENT
Start: 2025-04-30 | End: 2025-04-30 | Stop reason: SURG

## 2025-04-30 RX ORDER — LABETALOL HYDROCHLORIDE 5 MG/ML
5 INJECTION, SOLUTION INTRAVENOUS
Status: DISCONTINUED | OUTPATIENT
Start: 2025-04-30 | End: 2025-04-30 | Stop reason: HOSPADM

## 2025-04-30 RX ORDER — FENTANYL CITRATE 50 UG/ML
INJECTION, SOLUTION INTRAMUSCULAR; INTRAVENOUS AS NEEDED
Status: DISCONTINUED | OUTPATIENT
Start: 2025-04-30 | End: 2025-04-30 | Stop reason: SURG

## 2025-04-30 RX ORDER — HYDROMORPHONE HYDROCHLORIDE 1 MG/ML
0.5 INJECTION, SOLUTION INTRAMUSCULAR; INTRAVENOUS; SUBCUTANEOUS
Status: DISCONTINUED | OUTPATIENT
Start: 2025-04-30 | End: 2025-04-30 | Stop reason: HOSPADM

## 2025-04-30 RX ADMIN — LAMOTRIGINE 200 MG: 100 TABLET ORAL at 08:01

## 2025-04-30 RX ADMIN — Medication 100 MCG: at 14:11

## 2025-04-30 RX ADMIN — Medication 100 MCG: at 14:20

## 2025-04-30 RX ADMIN — PROPOFOL 100 MG: 10 INJECTION, EMULSION INTRAVENOUS at 14:11

## 2025-04-30 RX ADMIN — INSULIN LISPRO 8 UNITS: 100 INJECTION, SOLUTION INTRAVENOUS; SUBCUTANEOUS at 21:16

## 2025-04-30 RX ADMIN — LIDOCAINE HYDROCHLORIDE 100 MG: 20 INJECTION, SOLUTION EPIDURAL; INFILTRATION; INTRACAUDAL; PERINEURAL at 14:11

## 2025-04-30 RX ADMIN — PANTOPRAZOLE SODIUM 40 MG: 40 TABLET, DELAYED RELEASE ORAL at 20:46

## 2025-04-30 RX ADMIN — LAMOTRIGINE 200 MG: 100 TABLET ORAL at 20:46

## 2025-04-30 RX ADMIN — LEVETIRACETAM 1500 MG: 500 TABLET, FILM COATED ORAL at 08:01

## 2025-04-30 RX ADMIN — LEVETIRACETAM 2000 MG: 500 TABLET, FILM COATED ORAL at 20:46

## 2025-04-30 RX ADMIN — CEFAZOLIN 2 G: 1 INJECTION, POWDER, FOR SOLUTION INTRAMUSCULAR; INTRAVENOUS at 14:18

## 2025-04-30 RX ADMIN — Medication 10 ML: at 10:17

## 2025-04-30 RX ADMIN — SODIUM CHLORIDE 1000 MG: 900 INJECTION INTRAVENOUS at 20:46

## 2025-04-30 RX ADMIN — Medication 100 MCG: at 14:16

## 2025-04-30 RX ADMIN — ROSUVASTATIN CALCIUM 20 MG: 20 TABLET, FILM COATED ORAL at 20:46

## 2025-04-30 RX ADMIN — FENTANYL CITRATE 100 MCG: 50 INJECTION, SOLUTION INTRAMUSCULAR; INTRAVENOUS at 14:06

## 2025-04-30 RX ADMIN — SODIUM CHLORIDE, POTASSIUM CHLORIDE, SODIUM LACTATE AND CALCIUM CHLORIDE 1000 ML: 600; 310; 30; 20 INJECTION, SOLUTION INTRAVENOUS at 13:31

## 2025-04-30 RX ADMIN — METOPROLOL TARTRATE 100 MG: 100 TABLET, FILM COATED ORAL at 20:46

## 2025-04-30 RX ADMIN — SACUBITRIL AND VALSARTAN 1 TABLET: 97; 103 TABLET, FILM COATED ORAL at 22:17

## 2025-04-30 RX ADMIN — Medication 10 ML: at 20:47

## 2025-04-30 RX ADMIN — GABAPENTIN 300 MG: 300 CAPSULE ORAL at 20:46

## 2025-04-30 RX ADMIN — INSULIN GLARGINE 25 UNITS: 100 INJECTION, SOLUTION SUBCUTANEOUS at 21:16

## 2025-04-30 RX ADMIN — Medication 100 MCG: at 14:22

## 2025-04-30 NOTE — PLAN OF CARE
Goal Outcome Evaluation:           Progress: improving     Pt A/Ox4. VSS on RA. IV to RAC and L wrist, IID. R 2nd toe partial ampuation this shift. Voiding via urinal. PT/OT to see, was up SBA prior to surgery.  Off-loading shoe applied. Non-weight bearing. Accucheck. No coverage given due to NPO status for surgery. Wound care. No c/o pain. Able to make needs known. Seizure free this shift. Safety  maintained. Wife at BS. Call light in reach.

## 2025-04-30 NOTE — ANESTHESIA POSTPROCEDURE EVALUATION
"Patient: Carlos A Boyer Jr.    Procedure Summary       Date: 04/30/25 Room / Location:  [facility] OR [address] [place] [address] [facility] OR    Anesthesia Start: 1406 Anesthesia Stop: 1450    Procedures:       Partial vs Complete Amputation of 2nd Toe, Hardware Removal from Hallux - Right Foot (Right: Toes)      Hardware Removal from Hallux - Right Foot (Right: Ankle) Diagnosis:       Cellulitis of right toe      Gas gangrene of foot      Retained orthopedic hardware      Type 2 diabetes mellitus with other circulatory complication, with long-term current use of insulin      (Cellulitis of right toe [L03.031])      (Gas gangrene of foot [A48.0])      (Retained orthopedic hardware [Z96.9])      (Type 2 diabetes mellitus with other circulatory complication, with long-term current use of insulin [E11.59, Z79.4])    Surgeons: Hernan Villa DPM Provider: Jin Zimmerman CRNA    Anesthesia Type: general ASA Status: 4            Anesthesia Type: general    Vitals  Vitals Value Taken Time   /82 04/30/25 15:17   Temp 97.1 °F (36.2 °C) 04/30/25 14:45   Pulse 60 04/30/25 15:20   Resp 9 04/30/25 14:55   SpO2 94 % 04/30/25 15:20   Vitals shown include unfiled device data.        Post Anesthesia Care and Evaluation    Patient location during evaluation: PACU  Patient participation: complete - patient participated  Level of consciousness: awake and alert  Pain management: adequate    Airway patency: patent  Anesthetic complications: No anesthetic complications    Cardiovascular status: acceptable  Respiratory status: acceptable  Hydration status: acceptable    Comments: Blood pressure 124/84, pulse 61, temperature 97.5 °F (36.4 °C), temperature source Oral, resp. rate 16, height 182.9 cm (72\"), weight 100 kg (221 lb 5.5 oz), SpO2 97%.    Pt discharged from PACU based on nawaf score >8    "

## 2025-04-30 NOTE — PLAN OF CARE
Goal Outcome Evaluation:           Progress: improving             Patient alert and responsive able to make needs  known. Patient has had no pain today and is npo after midnight for great toe amp and hardware removal. No acute distress noted

## 2025-04-30 NOTE — ANESTHESIA PREPROCEDURE EVALUATION
Anesthesia Evaluation     Patient summary reviewed   history of anesthetic complications:  PONV prolonged sedation  NPO Solid Status: > 8 hours  NPO Liquid Status: > 8 hours           Airway   Mallampati: II  TM distance: >3 FB  Neck ROM: full  Dental    (+) poor dentition        Pulmonary    (+) asthma,sleep apnea  (-) COPD, not a smoker  Cardiovascular   Exercise tolerance: good (4-7 METS)    ECG reviewed  PT is on anticoagulation therapy    (+) hypertension, valvular problems/murmurs murmur, CAD, CABG, dysrhythmias Atrial Fib, hyperlipidemia,  carotid artery disease  (-) pacemaker, past MI, angina, CHF, cardiac stents    ROS comment: Echo:  ·  Left ventricular systolic function is normal. Left ventricular ejection fraction appears to be 61 - 65%.  ·  The following left ventricular wall segments are very mildly hypokinetic: apical inferior, apical septal and apex hypokinetic.  ·  Left ventricular diastolic function is consistent with (grade II w/high LAP) pseudonormalization.  ·  Left atrial volume is mildly increased.  ·  Estimated right ventricular systolic pressure from tricuspid regurgitation is normal (<35 mmHg).  ·  Normal size and function of the right ventricle.  ·  No significant valvular pathology.  ·  No significant change compared to prior exam from 4/29/21.         Neuro/Psych  (+) seizures well controlled, CVA  (-) TIA  GI/Hepatic/Renal/Endo    (+) obesity, liver disease fatty liver disease, diabetes mellitus using insulin  (-) GERD, no renal disease    Musculoskeletal     Abdominal   (+) obese   Substance History      OB/GYN          Other   arthritis,   history of cancer                  Anesthesia Plan    ASA 4     general     intravenous induction     Anesthetic plan, risks, benefits, and alternatives have been provided, discussed and informed consent has been obtained with: patient.      CODE STATUS:    Code Status (Patient has no pulse and is not breathing): CPR (Attempt to  Resuscitate)  Medical Interventions (Patient has pulse or is breathing): Full Support

## 2025-04-30 NOTE — ANESTHESIA PROCEDURE NOTES
Airway  Reason: elective    Date/Time: 4/30/2025 2:12 PM  Airway not difficult    General Information and Staff    Patient location during procedure: OR  CRNA/CAA: Jin Zimmerman CRNA    Indications and Patient Condition  Indications for airway management: airway protection    Preoxygenated: yes    Mask difficulty assessment: 0 - not attempted    Final Airway Details    Final airway type: supraglottic airway      Successful airway: I-gel  Size: 5   Number of attempts at approach: 1  Assessment: lips, teeth, and gum same as pre-op

## 2025-04-30 NOTE — PROGRESS NOTES
[facility] Medicine Services  INPATIENT PROGRESS NOTE    Patient Name: Carlos A Boyer Jr.  Date of Admission: 4/26/2025  Today's Date: 04/30/25  Length of Stay: 4  Primary Care Physician: Vinayak Correia PA    Subjective   Chief Complaint: Right foot pain    No acute events overnight.  Plan for right second toe amputation with podiatry today.    Review of Systems   All pertinent negatives and positives are as above. All other systems have been reviewed and are negative unless otherwise stated.     Objective    Temp:  [97.5 °F (36.4 °C)-97.8 °F (36.6 °C)] 97.6 °F (36.4 °C)  Heart Rate:  [53-69] 62  Resp:  [14-18] 16  BP: (102-133)/(48-71) 133/67  Physical Exam  GEN: Awake, alert, interactive, in NAD on room air   HEENT: Atraumatic, EOMI, Anicteric  Lungs: CTAB  Heart: RRR  ABD: soft, nt/nd, +BS, no guarding/rebound  Extremities: right second digit wound   Skin: no rashes or lesions  Neuro: AAOx3, no focal deficits  Psych: normal mood & affect      Results Review:  I have reviewed the labs, radiology results, and diagnostic studies.    Laboratory Data:   Results from last 7 days   Lab Units 04/30/25 0319 04/29/25  0841 04/28/25  0515   WBC 10*3/mm3 3.91 3.35* 3.81   HEMOGLOBIN g/dL 13.2 13.5 13.7   HEMATOCRIT % 39.8 41.0 40.6   PLATELETS 10*3/mm3 85* 79* 73*        Results from last 7 days   Lab Units 04/30/25  0319 04/29/25  0841 04/28/25  0515 04/27/25  0409 04/26/25  1652   SODIUM mmol/L 138 136 136 136 136   POTASSIUM mmol/L 4.6 4.2 4.0 4.9 4.5   CHLORIDE mmol/L 104 103 104 99 100   CO2 mmol/L 22.0 21.0* 21.0* 23.0 23.0   BUN mg/dL 21 19 20 15 15   CREATININE mg/dL 0.87 0.90 0.93 1.02 1.10   CALCIUM mg/dL 8.8 8.8 8.6 8.9 9.3   BILIRUBIN mg/dL  --   --  0.9 2.0* 1.6*   ALK PHOS U/L  --   --  59 63 64   ALT (SGPT) U/L  --   --  23 26 25   AST (SGOT) U/L  --   --  27 42* 31   GLUCOSE mg/dL 213* 201* 140* 177* 164*       Culture Data:   @[facility]@    Radiology Data:    Imaging Results (Last 24 Hours)       ** No results found for the last 24 hours. **            I have reviewed the patient's current medications.     Assessment/Plan   Assessment  Active Hospital Problems    Diagnosis     **Recurrent seizures     Gas gangrene of foot     Retained orthopedic hardware     Chronic heart failure with preserved ejection fraction (HFpEF)     Cellulitis of right toe     Presence of CardioMEMS HF system     S/P CABG x 2     Chronic diastolic congestive heart failure with preserved ejection fraction     Atrial flutter     Class 1 obesity due to excess calories with serious comorbidity and body mass index (BMI) of 34.0 to 34.9 in adult     Diabetic peripheral neuropathy     Diabetic complication     Deviated nasal septum     Maribell bullosa     Benign non-nodular prostatic hyperplasia with lower urinary tract symptoms     Gastroesophageal reflux disease without esophagitis     Type 2 diabetes mellitus with circulatory disorder, with long-term current use of insulin        Treatment Plan    # Recurrent seizures  Reported 5 seizures at home, adherence to meds  Previously, last breakthrough seizure was > 3 years ago  CT Head: Mild atrophy and chronic white matter changes but no definite acute intracranial process. Paranasal sinus disease with an air-fluid level in the right maxillary sinus suggesting mild acute sinusitis. Mild scattered mucosal thickening in the ethmoid air cells.  MRI brain: No acute findings.  - Continue levetiracetam and lamotrigine  - Neurology no longer following , he will need to follow up in 6-8 weeks  - seizure precautions     # Right Foot Infection  Tmax 101.5F on 4/26  Foot x-ray showing soft tissue swelling, mild emphysema and hardware loosening.    - Continue Rocephin day 5, started empirically on admission  - Follow-up blood cultures, no growth day 3  - podiatry planning toe amputation today    # HFpEF s/p cardioMEMS 5/2023  # CAD s/p CABG  # Atrial flutter - on  Eliquis   -Continue Imdur, statin, Lopressor, and Eliquis    # HOA - on CPAP    # Type II Diabetes with Neuropathy - HgbA1c 7.6%  - Continue Lantus 25 every 12h and SSI  - Refusing some insulin doses  - Increasing SSI dose today  - Continue Jardiance, gabapentin    # Class I Obesity - Body mass index is 30.01 kg/m².     DVT prophylaxis: Home Eliquis  GI prophylaxis: Protonix    Medical Decision Making  Number and Complexity of problems: 1 acute moderate complexity, 1 acute on chronic moderate complexity, others chronic and stable   Differential Diagnosis: As above    Conditions and Status        Stable. Improving.      MDM Data  External documents reviewed: Reviewed  Cardiac tracing (EKG, telemetry) interpretation: Reviewed  Radiology interpretation: Reviewed  Labs reviewed: As above  Any tests that were considered but not ordered: None     Decision rules/scores evaluated (example [id number], Wells, etc): None     Discussed with: Patient and RN     Care Planning  Shared decision making: Patient apprised of current labs, vitals, imaging and treatment plan.  They are agreeable with proceeding with plans as discussed.   Code status and discussions: Full code    Disposition  Social Determinants of Health that impact treatment or disposition: None  I expect the patient to be discharged to TBD in TBD days.         Electronically signed by Angela Cesar MD, 04/30/25, 11:45 CDT.

## 2025-04-30 NOTE — PLAN OF CARE
Goal Outcome Evaluation:              Outcome Evaluation: ALOX4. RENE. ACHS. up at gamal. drsg on toe looks CDI. wife at bedside. NSR on tele. NPO for possible surgery today.

## 2025-04-30 NOTE — OP NOTE
POSTOPERATIVE NOTE  Patient: Carlos A Boyer Jr.  MRN: [id number]    YOB: 1960  Age: 65 y.o.  Sex: male  Unit: [facility] OR Room/Bed: PAD OR/MAIN OR [facility]    Date of Operation: 4/30/2025     Preoperative Diagnosis:  Cellulitis of right toe [L03.031]  Gas gangrene of foot [A48.0]  Retained orthopedic hardware [Z96.9]  Type 2 diabetes mellitus with other circulatory complication, with long-term current use of insulin [E11.59, Z79.4]     Postoperative Diagnosis:  1. Same as pre-operative    Operative Procedures:  1.  Partial 2nd Toe Amputation - Right Foot  2.  Removal of Hardware from Hallux - Right Foot    Surgeon: Surgeons and Role:     * Hernan Villa DPM - Primary    Anesthesia: General     Hemostasis: Anatomic Dissection, Electric cauterization    Estimated Blood Loss: minimal    Pathology:   Specimens       ID Source Type Tests Collected By Collected At Frozen?    A Toe, Right Tissue TISSUE PATHOLOGY EXAM   Hernan Villa DPM 4/30/25 1429     Description: Right 2nd Toe            Materials: Nothing was implanted during the procedure    Injectables: 10mL 0.5% Marcaine Plain    Fluids: See anesthesia log.    Drains: None    Complications: None    Postoperative Condition: Stable. Patient tolerated procedure and anesthesia well. Patient left the operating room with vital signs stable and vascular status intact.     Operative Findings: Consistent with preoperative diagnosis.    Indications for Procedure: This 65 y.o. patient presents with acute infection of the right second toe as well as backing out screw from the great toe.  Patient requires surgery for infection control. The patient has been NPO for greater than 8 hours. The patient is ready for surgical intervention.    DESCRIPTION OF PROCEDURE  Under mild sedation, patient was brought into the operating room and placed on the operating room table in supine position. Preoperative antibiotic was given. The patient was placed  under General anesthesia, then local block was performed at the surgical site using the above mentioned local anesthesia. The foot and ankle were then scrubbed, prepped and draped in the usual aseptic manner.     Attention was then directed to the right second toe where a modified fishmouth incision was made around the PIPJ.  Incision was made down to the level of bone.  Soft tissue was freed along the dorsal aspect of the proximal phalanx.  Next utilizing a bone saw, an osteotomy was created and the distal aspect of the digit was resected and passed from the operative field.  Oblique sent pathology for analysis.  No significant proximal infection was appreciated.  Tendon structures were resected as far proximal as possible.  The wound was then flushed with copious amounts of sterile saline.  Hemostasis was obtained utilizing electrocautery.  Subcutaneous tissues reapproximated utilizing 4-0 Vicryl.  Skin was reapproximated Utilizing 4-0 nylon in simple suture technique.    Attention was then directed to the distal aspect of the right hallux where a small horizontal incision was made.  Soft tissue was bluntly dissected down to the palpable screw.  Next utilizing a screwdriver, the screw was backed out and removed in toto and passed from the operative field.  No infection was appreciated.  The wound was then flushed with copious amounts of sterile saline.  Subcutaneous tissues reapproximated utilizing 4-0 Vicryl.  Skin was reapproximated utilizing 4-0 nylon in simple suture technique.    Hemostasis was obtained.  CFT remains to all remaining aspect of the foot.    All counts were correct.    A bandage consisting of Adaptic, Betadine soaked gauze, ABD, Kerlix, and Coban was applied.    The patient tolerated the procedures and anesthesia well.  They were transferred to the recovery room with vital signs stable and vascular status intact to the Right lower extremity.  After a period of postoperative monitoring, the  patient will be transferred back to floor care hospitalist team.  They are to be nonweightbearing to the surgical foot.

## 2025-05-01 LAB
ANION GAP SERPL CALCULATED.3IONS-SCNC: 7 MMOL/L (ref 5–15)
BUN SERPL-MCNC: 16 MG/DL (ref 8–23)
BUN/CREAT SERPL: 18.4 (ref 7–25)
CALCIUM SPEC-SCNC: 8.5 MG/DL (ref 8.6–10.5)
CHLORIDE SERPL-SCNC: 105 MMOL/L (ref 98–107)
CO2 SERPL-SCNC: 21 MMOL/L (ref 22–29)
CREAT SERPL-MCNC: 0.87 MG/DL (ref 0.76–1.27)
DEPRECATED RDW RBC AUTO: 44.6 FL (ref 37–54)
EGFRCR SERPLBLD CKD-EPI 2021: 94.6 ML/MIN/1.73
ERYTHROCYTE [DISTWIDTH] IN BLOOD BY AUTOMATED COUNT: 13.2 % (ref 12.3–15.4)
GLUCOSE BLDC GLUCOMTR-MCNC: 160 MG/DL (ref 70–130)
GLUCOSE BLDC GLUCOMTR-MCNC: 174 MG/DL (ref 70–130)
GLUCOSE BLDC GLUCOMTR-MCNC: 205 MG/DL (ref 70–130)
GLUCOSE BLDC GLUCOMTR-MCNC: 226 MG/DL (ref 70–130)
GLUCOSE SERPL-MCNC: 205 MG/DL (ref 65–99)
HCT VFR BLD AUTO: 40.8 % (ref 37.5–51)
HGB BLD-MCNC: 13.4 G/DL (ref 13–17.7)
MCH RBC QN AUTO: 30.2 PG (ref 26.6–33)
MCHC RBC AUTO-ENTMCNC: 32.8 G/DL (ref 31.5–35.7)
MCV RBC AUTO: 92.1 FL (ref 79–97)
PLATELET # BLD AUTO: 91 10*3/MM3 (ref 140–450)
PMV BLD AUTO: 11.6 FL (ref 6–12)
POTASSIUM SERPL-SCNC: 4.1 MMOL/L (ref 3.5–5.2)
RBC # BLD AUTO: 4.43 10*6/MM3 (ref 4.14–5.8)
RICKETTSIA RICKETTSII DNA, RT: NOT DETECTED
SODIUM SERPL-SCNC: 133 MMOL/L (ref 136–145)
WBC NRBC COR # BLD AUTO: 5.36 10*3/MM3 (ref 3.4–10.8)

## 2025-05-01 PROCEDURE — 63710000001 INSULIN GLARGINE PER 5 UNITS: Performed by: PODIATRIST

## 2025-05-01 PROCEDURE — 82948 REAGENT STRIP/BLOOD GLUCOSE: CPT

## 2025-05-01 PROCEDURE — 63710000001 INSULIN LISPRO (HUMAN) PER 5 UNITS: Performed by: PODIATRIST

## 2025-05-01 PROCEDURE — 25010000002 MORPHINE PER 10 MG

## 2025-05-01 PROCEDURE — 85027 COMPLETE CBC AUTOMATED: CPT | Performed by: PODIATRIST

## 2025-05-01 PROCEDURE — 97110 THERAPEUTIC EXERCISES: CPT

## 2025-05-01 PROCEDURE — 99024 POSTOP FOLLOW-UP VISIT: CPT | Performed by: NURSE PRACTITIONER

## 2025-05-01 PROCEDURE — 97164 PT RE-EVAL EST PLAN CARE: CPT | Performed by: PHYSICAL THERAPIST

## 2025-05-01 PROCEDURE — 25010000002 CEFTRIAXONE PER 250 MG: Performed by: PODIATRIST

## 2025-05-01 PROCEDURE — 80048 BASIC METABOLIC PNL TOTAL CA: CPT | Performed by: PODIATRIST

## 2025-05-01 PROCEDURE — 97116 GAIT TRAINING THERAPY: CPT

## 2025-05-01 RX ORDER — MULTIPLE VITAMINS W/ MINERALS TAB 9MG-400MCG
1 TAB ORAL DAILY
Status: DISCONTINUED | OUTPATIENT
Start: 2025-05-01 | End: 2025-05-02 | Stop reason: HOSPADM

## 2025-05-01 RX ORDER — MORPHINE SULFATE 2 MG/ML
1 INJECTION, SOLUTION INTRAMUSCULAR; INTRAVENOUS ONCE AS NEEDED
Status: COMPLETED | OUTPATIENT
Start: 2025-05-01 | End: 2025-05-01

## 2025-05-01 RX ADMIN — SACUBITRIL AND VALSARTAN 1 TABLET: 97; 103 TABLET, FILM COATED ORAL at 20:28

## 2025-05-01 RX ADMIN — LAMOTRIGINE 200 MG: 100 TABLET ORAL at 20:29

## 2025-05-01 RX ADMIN — EMPAGLIFLOZIN 25 MG: 10 TABLET, FILM COATED ORAL at 09:01

## 2025-05-01 RX ADMIN — PANTOPRAZOLE SODIUM 40 MG: 40 TABLET, DELAYED RELEASE ORAL at 20:28

## 2025-05-01 RX ADMIN — INSULIN GLARGINE 25 UNITS: 100 INJECTION, SOLUTION SUBCUTANEOUS at 20:29

## 2025-05-01 RX ADMIN — ACETAMINOPHEN 325 MG: 325 TABLET, FILM COATED ORAL at 13:08

## 2025-05-01 RX ADMIN — MORPHINE SULFATE 1 MG: 2 INJECTION, SOLUTION INTRAMUSCULAR; INTRAVENOUS at 04:44

## 2025-05-01 RX ADMIN — INSULIN LISPRO 5 UNITS: 100 INJECTION, SOLUTION INTRAVENOUS; SUBCUTANEOUS at 20:41

## 2025-05-01 RX ADMIN — GABAPENTIN 300 MG: 300 CAPSULE ORAL at 20:29

## 2025-05-01 RX ADMIN — APIXABAN 5 MG: 5 TABLET, FILM COATED ORAL at 09:02

## 2025-05-01 RX ADMIN — METOPROLOL TARTRATE 100 MG: 100 TABLET, FILM COATED ORAL at 09:02

## 2025-05-01 RX ADMIN — INSULIN LISPRO 3 UNITS: 100 INJECTION, SOLUTION INTRAVENOUS; SUBCUTANEOUS at 17:00

## 2025-05-01 RX ADMIN — METOPROLOL TARTRATE 100 MG: 100 TABLET, FILM COATED ORAL at 20:29

## 2025-05-01 RX ADMIN — ROSUVASTATIN CALCIUM 20 MG: 20 TABLET, FILM COATED ORAL at 20:29

## 2025-05-01 RX ADMIN — SACUBITRIL AND VALSARTAN 1 TABLET: 97; 103 TABLET, FILM COATED ORAL at 10:00

## 2025-05-01 RX ADMIN — ISOSORBIDE MONONITRATE 120 MG: 30 TABLET, EXTENDED RELEASE ORAL at 09:02

## 2025-05-01 RX ADMIN — Medication 1 TABLET: at 13:08

## 2025-05-01 RX ADMIN — LEVETIRACETAM 2000 MG: 500 TABLET, FILM COATED ORAL at 20:28

## 2025-05-01 RX ADMIN — Medication 10 ML: at 09:03

## 2025-05-01 RX ADMIN — TAMSULOSIN HYDROCHLORIDE 0.4 MG: 0.4 CAPSULE ORAL at 09:02

## 2025-05-01 RX ADMIN — ASPIRIN 81 MG: 81 TABLET, COATED ORAL at 09:02

## 2025-05-01 RX ADMIN — INSULIN LISPRO 3 UNITS: 100 INJECTION, SOLUTION INTRAVENOUS; SUBCUTANEOUS at 09:03

## 2025-05-01 RX ADMIN — SODIUM CHLORIDE 1000 MG: 900 INJECTION INTRAVENOUS at 20:28

## 2025-05-01 RX ADMIN — LAMOTRIGINE 200 MG: 100 TABLET ORAL at 09:02

## 2025-05-01 RX ADMIN — PANTOPRAZOLE SODIUM 40 MG: 40 TABLET, DELAYED RELEASE ORAL at 09:02

## 2025-05-01 RX ADMIN — APIXABAN 5 MG: 5 TABLET, FILM COATED ORAL at 20:28

## 2025-05-01 RX ADMIN — FLUTICASONE PROPIONATE 2 SPRAY: 50 SPRAY, METERED NASAL at 09:03

## 2025-05-01 RX ADMIN — INSULIN GLARGINE 25 UNITS: 100 INJECTION, SOLUTION SUBCUTANEOUS at 09:03

## 2025-05-01 RX ADMIN — Medication 10 ML: at 20:29

## 2025-05-01 RX ADMIN — INSULIN LISPRO 5 UNITS: 100 INJECTION, SOLUTION INTRAVENOUS; SUBCUTANEOUS at 11:36

## 2025-05-01 RX ADMIN — LEVETIRACETAM 1500 MG: 500 TABLET, FILM COATED ORAL at 09:02

## 2025-05-01 NOTE — PLAN OF CARE
Goal Outcome Evaluation:  Plan of Care Reviewed With: patient, caregiver        Progress: improving  Outcome Evaluation: Nutriton follow up. Pt reports good appetite today. Regular diet. Boost Glucose Control daily with lunch. Pt reports he is consuming oral supplement. PO intake avg.80% of five meals;po fluid intake 540 ml of two days per intake report (fluids at bedside available). Encouarged oral intake. Patients last BM 5/1,Randal score 20,skin tear to coccyx,right posterior great toe surgical wound/surgical incision with mildly impaired mobility. Diet modified to Cardiac/CCHO diet (diet prior to surgery). Pertinenet labs Na+133 mmol/L,glucuose range 128-278 mg/dl (3 day review). Pertinent medications Jardiance,SC lantus,SC humalog,Keppra,protonix,miralax. MVI with minerals ordered daily to aid in wound healing. Cont to follow for plan of care.

## 2025-05-01 NOTE — PROGRESS NOTES
[facility] - PODIATRY    Today's Date: 05/01/25     Patient Name: Carlos A Boyer Jr.  MRN: [id number]  CSN: [id number]  PCP: Vinayak Correia PA  Referring Provider: Vish Latham MD  Attending Provider: Angela Cesar MD  Length of Stay: 5    SUBJECTIVE   Chief Complaint: Postop right second toe partial amputation and right hallux hardware removal     HPI: Carlos A Boyer Jr., a 65 y.o.male.  Patient is 1 day postop second toe partial amputation and right hallux hardware removal.  Patient reports he has had pain off and on throughout the night.  Patient is currently wearing postop shoe and his dressing is clean dry and intact.  Patient was evaluated by physical therapy and a knee scooter is not recommended due to stability issues.  Patient has been up walking with weight to his heel.    Past Medical History:   Diagnosis Date    Arthritis     Asthma     Cancer     skin    Carotid disease, bilateral     Cellulitis     right foot - on antibiotics 6-    Chest pain     Chronic diastolic congestive heart failure 09/07/2019    Chronic sinusitis     Maribell bullosa     Coronary artery disease involving native coronary artery of native heart with unstable angina pectoris 01/17/2017    Deviated septum     Diabetes mellitus     Difficulty urinating     Diverticulitis     Enlarged prostate     Fatty liver     GERD (gastroesophageal reflux disease)     Elim IRA (hard of hearing)     Does have hearing aids    Hyperlipidemia LDL goal <70 02/02/2017    Hypertrophy of nasal turbinates     Keratoderma     Kidney stone     Lung nodules     lower right lung    Migraine     Murmur, heart     Myocardial infarction     Obesity     Paroxysmal atrial fibrillation 07/11/2019    Personal history of COVID-19 07/2021    PONV (postoperative nausea and vomiting)     Primary hypertension 10/16/2016    Psoriasis     Seizures     Sinus congestion     Skin cancer     Sleep apnea     not using cpap    SOB (shortness of  breath)     Stroke     mild weakness on right side    UTI (urinary tract infection)      Past Surgical History:   Procedure Laterality Date    AMPUTATION DIGIT Right 4/30/2025    Procedure: Partial vs Complete Amputation of 2nd Toe, Hardware Removal from Hallux - Right Foot;  Surgeon: Hernan Villa DPM;  Location: [facility] OR;  Service: Podiatry;  Laterality: Right;    CARDIAC CATHETERIZATION  01/2016    Dr. Broadbent; widely patent previously placed stents in the left anterior descending and obstructive disease involving the diagonal branch which was treated medically    CARDIAC CATHETERIZATION N/A 07/14/2017    Procedure: Left Heart Cath;  Surgeon: Wade Ramey MD;  Location:  [facility] CATH INVASIVE LOCATION;  Service:     CARDIAC CATHETERIZATION Left 10/15/2018    Procedure: Cardiac Catheterization/Vascular Study;  Surgeon: Wade Ramey MD;  Location:  [facility] CATH INVASIVE [facility];  Service: Cardiology    CARDIAC CATHETERIZATION  10/15/2018    Procedure: Functional Flow Ramsey;  Surgeon: Wade Ramey MD;  Location:  [facility] CATH INVASIVE [facility];  Service: Cardiology    CARDIAC CATHETERIZATION N/A 10/15/2018    Procedure: Left ventriculography;  Surgeon: Wade Ramey MD;  Location:  [facility] CATH INVASIVE [facility];  Service: Cardiology    CARDIAC CATHETERIZATION Left 06/26/2019    Procedure: Cardiac Catheterization/Vascular Study VEL OK  HE WILL WAIT 1 YEAR FOR SHOULDER SURGERY ;  Surgeon: Wade Ramey MD;  Location:  [facility] CATH INVASIVE [facility];  Service: Cardiology    CARDIAC CATHETERIZATION Left 04/30/2021    Procedure: Coronary angiography;  Surgeon: Sahil Llamas MD;  Location:  [facility] CATH INVASIVE LOC[facility];  Service: Cardiology;  Laterality: Left;    CARDIAC CATHETERIZATION N/A 04/30/2021    Procedure: Percutaneous Coronary Intervention;  Surgeon: Sahil Llamas MD;  Location:  [facility] CATH INVASIVE [facility];  Service: Cardiology;  Laterality: N/A;    CARDIAC CATHETERIZATION N/A 11/09/2022     Procedure: Left Heart Cath with SVGs;  Surgeon: Wade Ramey MD;  Location: [facility] CATH INVASIVE LOCATION;  Service: Cardiology;  Laterality: N/A;    CARPAL TUNNEL RELEASE      January 2024 and pther hand February 2024    CHOLECYSTECTOMY      CHOLECYSTECTOMY WITH INTRAOPERATIVE CHOLANGIOGRAM N/A 08/01/2018    Procedure: CHOLECYSTECTOMY LAPAROSCOPIC INTRAOPERATIVE CHOLANGIOGRAM;  Surgeon: Shane Ann MD;  Location: [facility] OR;  Service: General    COLONOSCOPY N/A 07/14/2020    Procedure: COLONOSCOPY WITH ANESTHESIA;  Surgeon: nAupam Morales DO;  Location: [facility] ENDOSCOPY;  Service: Gastroenterology;  Laterality: N/A;  pre: abdominal pain  post: diverticulosis  Vinayak Correia PA    CORONARY ANGIOPLASTY      CORONARY ARTERY BYPASS GRAFT N/A 07/06/2019    Procedure: CABG X2 WITH LIMA, LEFT LEG OVH, AND PLACEMENT OF LEFT FEMORAL ARTERIAL LINE;  Surgeon: Steven Tang MD;  Location: [facility] OR;  Service: Cardiothoracic    CORONARY STENT PLACEMENT      x 6    CYSTOSCOPY TRANSURETHRAL RESECTION OF PROSTATE N/A 01/23/2023    Procedure: CYSTOSCOPY TRANSURETHRAL RESECTION OF PROSTATE;  Surgeon: Latrell Pope MD;  Location: [facility] OR;  Service: Urology;  Laterality: N/A;    ENDOSCOPIC FUNCTIONAL SINUS SURGERY (FESS) Bilateral 12/13/2017    Procedure: PROCEDURE PERFORMED:  Bilateral functional endoscopic anterior ethmoidectomy with bilateral middle meatal antrostomy Septoplasty Right kathia bullosa resection Bilateral inferior turbinate reduction via Coblation;  Surgeon: Mayank Ibarra MD;  Location: [facility] OR;  Service:     ENDOSCOPY N/A 07/30/2018    Procedure: ESOPHAGOGASTRODUODENOSCOPY WITH ANESTHESIA;  Surgeon: Benitez Mas MD;  Location: [facility] ENDOSCOPY;  Service: Gastroenterology    ENDOSCOPY N/A 07/14/2020    Procedure: ESOPHAGOGASTRODUODENOSCOPY WITH ANESTHESIA;  Surgeon: Anupam Morales DO;  Location: [facility] ENDOSCOPY;  Service: Gastroenterology;  Laterality: N/A;  pre: abdominal  pain  post: esophagitis  Vinayak Correia PA    HARDWARE REMOVAL Right 2025    Procedure: Hardware Removal from Hallux - Right Foot;  Surgeon: Hernan Villa DPM;  Location:  [facility] OR;  Service: Podiatry;  Laterality: Right;    HERNIA REPAIR      x2 inguinal area    KIDNEY STONE SURGERY      KNEE ARTHROSCOPY Right 2022    Procedure: RIGHT KNEE PARTIAL LATERAL MENISCECTOMY;  Surgeon: Pedro Pablo Song MD;  Location:  [facility] OR;  Service: Orthopedics;  Laterality: Right;    KNEE SURGERY Right     OTHER SURGICAL HISTORY      urolift    PROSTATE SURGERY      Dr. Badillo - 2017    PULMONARY ARTERY PRESSURE SENSOR IMPLANT N/A 2023    Procedure: PA Pressure Sensor Implant;  Surgeon: Wade Ramey MD;  Location:  [facility] CATH INVASIVE LOC[facility];  Service: Cardiology;  Laterality: N/A;    ROTATOR CUFF REPAIR Right     SLEEP ENDOSCOPY N/A 2023    Procedure: Videosleep endoscopy;  Surgeon: Mayank Ibarra MD;  Location:  [facility] OR;  Service: ENT;  Laterality: N/A;    THUMB AMPUTATION Left     partial    TOE FUSION Right 7/3/2024    Procedure: Hallux Interphalangeal Joint Arthrodesis, Bone Graft Avis - Right Foot;  Surgeon: Hernan Villa DPM;  Location:  [facility] OR;  Service: Podiatry;  Laterality: Right;    TOE SURGERY      great toe     Family History   Problem Relation Age of Onset    Heart disease Father     COPD Mother     Hypertension Mother     Asthma Mother     No Known Problems Sister     Colon cancer Paternal Uncle     Prostate cancer Maternal Grandfather     No Known Problems Sister     Colon cancer Maternal Grandmother     Colon polyps Maternal Grandmother      Social History     Socioeconomic History    Marital status:    Tobacco Use    Smoking status: Former     Current packs/day: 0.00     Average packs/day: 1.5 packs/day for 18.0 years (27.0 ttl pk-yrs)     Types: Cigarettes, Cigars     Start date:      Quit date:      Years since quittin.3     Passive  exposure: Past    Smokeless tobacco: Former     Types: Chew     Quit date: 2009   Vaping Use    Vaping status: Never Used   Substance and Sexual Activity    Alcohol use: No     Comment: 0    Drug use: No    Sexual activity: Defer     Allergies   Allergen Reactions    Flagyl [Metronidazole] Hives    Atorvastatin Other (See Comments) and Myalgia      - LIPITOR -   Muscle cramps    Ciprofloxacin Hives      - CIPRO -      Current Facility-Administered Medications   Medication Dose Route Frequency Provider Last Rate Last Admin    acetaminophen (TYLENOL) tablet 325 mg  325 mg Oral Q6H PRN Daisy, Hernan A, DPM   325 mg at 04/26/25 2038    albuterol (PROVENTIL) nebulizer solution 0.083% 2.5 mg/3mL  2.5 mg Nebulization Q6H PRN Daisy, Hernan A, DPM        apixaban (ELIQUIS) tablet 5 mg  5 mg Oral BID Daisy, Hernan A, DPM   5 mg at 05/01/25 0902    aspirin EC tablet 81 mg  81 mg Oral Daily Daisy, Sargent A, DPM   81 mg at 05/01/25 0902    sennosides-docusate (PERICOLACE) 8.6-50 MG per tablet 2 tablet  2 tablet Oral BID PRN Daisy, Sargent A, DPM        And    polyethylene glycol (MIRALAX) packet 17 g  17 g Oral Daily PRN Daisy, Hernan A, DPM        And    bisacodyl (DULCOLAX) EC tablet 5 mg  5 mg Oral Daily PRN Daisy, Hernan A, DPM        And    bisacodyl (DULCOLAX) suppository 10 mg  10 mg Rectal Daily PRN Daisy, Sargent A, DPM        cefTRIAXone (ROCEPHIN) 1,000 mg in sodium chloride 0.9 % 100 mL MBP  1,000 mg Intravenous Q24H Daisy, Hernan A,  mL/hr at 04/30/25 2046 1,000 mg at 04/30/25 2046    dextrose (D50W) (25 g/50 mL) IV injection 25 g  25 g Intravenous Q15 Min PRN Daisy, Sargent A, DPM        dextrose (GLUTOSE) oral gel 15 g  15 g Oral Q15 Min PRN Daisy, Sargent A, DPM        docusate sodium (COLACE) capsule 100 mg  100 mg Oral Daily PRN Hernan Villa DPM        empagliflozin (JARDIANCE) tablet 25 mg  25 mg Oral Daily Hernan Villa DPM   25 mg at 05/01/25 0901    fluticasone (FLONASE)  50 MCG/ACT nasal spray 2 spray  2 spray Nasal Daily Hernan Villa A, DPM   2 spray at 05/01/25 0903    furosemide (LASIX) tablet 40 mg  40 mg Oral Daily PRN Hernan Villa A, DPM        gabapentin (NEURONTIN) capsule 300 mg  300 mg Oral Nightly Triston Villack A, DPM   300 mg at 04/30/25 2046    glucagon (GLUCAGEN) injection 1 mg  1 mg Intramuscular Q15 Min PRN Hernan Villa, DPM        guaiFENesin (MUCINEX) 12 hr tablet 1,200 mg  1,200 mg Oral Daily PRN Hernan Villa A, DPM        insulin glargine (LANTUS, SEMGLEE) injection 25 Units  25 Units Subcutaneous Q12H Hernan Villa, DPM   25 Units at 05/01/25 0903    Insulin Lispro (humaLOG) injection 3-14 Units  3-14 Units Subcutaneous 4x Daily AC & at Bedtime Hernan Villa DPM   5 Units at 05/01/25 1136    isosorbide mononitrate (IMDUR) 24 hr tablet 120 mg  120 mg Oral Daily Hernan Villa A, DPM   120 mg at 05/01/25 0902    lamoTRIgine (LaMICtal) tablet 200 mg  200 mg Oral BID Hernan Villa, DPM   200 mg at 05/01/25 0902    levETIRAcetam (KEPPRA) tablet 1,500 mg  1,500 mg Oral Daily Hernan Villa, DPM   1,500 mg at 05/01/25 0902    levETIRAcetam (KEPPRA) tablet 2,000 mg  2,000 mg Oral Nightly Hernan Villa A, DPM   2,000 mg at 04/30/25 2046    [Transfer Hold] LORazepam (ATIVAN) injection 1 mg  1 mg Intravenous Q2H PRN Vish Latham MD        metoprolol tartrate (LOPRESSOR) tablet 100 mg  100 mg Oral BID Hernan Villa A, DPM   100 mg at 05/01/25 0902    multivitamin with minerals 1 tablet  1 tablet Oral Daily Cecelia Moise RDN, LD        nitroglycerin (NITROSTAT) SL tablet 0.4 mg  0.4 mg Sublingual Q5 Min PRN Hernan Villa DPM        ondansetron (ZOFRAN) injection 4 mg  4 mg Intravenous Q6H PRN Hernan Villa DPM        pantoprazole (PROTONIX) EC tablet 40 mg  40 mg Oral BID Hernan Villa DPM   40 mg at 05/01/25 0902    polyethylene glycol (MIRALAX) packet 17 g  17 g Oral Daily Hernan Villa DPM   17 g at 04/26/25  2038    rosuvastatin (CRESTOR) tablet 20 mg  20 mg Oral Nightly Daisy, Hernan A, DPM   20 mg at 04/30/25 2046    sacubitril-valsartan (ENTRESTO)  MG tablet 1 tablet  1 tablet Oral BID Daisy, Hernan A, DPM   1 tablet at 05/01/25 1000    sodium chloride 0.9 % flush 10 mL  10 mL Intravenous Q12H Daisy, [place] A, DPM   10 mL at 05/01/25 0903    sodium chloride 0.9 % flush 10 mL  10 mL Intravenous PRN Daisy, [place] A, DPM        sodium chloride 0.9 % infusion 40 mL  40 mL Intravenous PRN Daisy, [place] A, DPM        tamsulosin (FLOMAX) 24 hr capsule 0.4 mg  0.4 mg Oral Daily Daisy, [place] A, DPM   0.4 mg at 05/01/25 0902     Review of Systems   Constitutional:  Negative for chills and fever.   HENT:  Negative for congestion.    Respiratory:  Negative for shortness of breath.    Cardiovascular:  Negative for chest pain.   Gastrointestinal:  Negative for constipation, diarrhea, nausea and vomiting.   Musculoskeletal:         Foot pain   Skin:  Positive for color change and wound.   Neurological:  Positive for seizures and numbness.   Psychiatric/Behavioral:  Negative for agitation.        OBJECTIVE     Vitals:    05/01/25 1127   BP: 133/71   Pulse: 58   Resp: 18   Temp: 98.1 °F (36.7 °C)   SpO2: 93%       PHYSICAL EXAM  GEN:   Pt presents in hospital bed. Accompanied by none.     Physical Exam  Vitals reviewed.   Constitutional:       Appearance: Normal appearance.   HENT:      Head: Normocephalic.      Right Ear: Tympanic membrane normal.      Left Ear: Tympanic membrane normal.      Nose: Nose normal.      Mouth/Throat:      Pharynx: Oropharynx is clear.   Cardiovascular:      Rate and Rhythm: Normal rate.      Pulses: Normal pulses.           Dorsalis pedis pulses are 2+ on the right side and 2+ on the left side.        Posterior tibial pulses are 2+ on the right side and 2+ on the left side.      Heart sounds: Normal heart sounds.   Pulmonary:      Effort: Pulmonary effort is normal.      Breath  sounds: Normal breath sounds.   Abdominal:      General: Bowel sounds are normal.   Musculoskeletal:      Cervical back: Normal range of motion and neck supple.   Feet:      Right foot:      Skin integrity: Warmth present.      Left foot:      Skin integrity: Warmth present.   Neurological:      Mental Status: He is alert and oriented to person, place, and time.   Psychiatric:         Mood and Affect: Mood normal.         Behavior: Behavior normal.         Thought Content: Thought content normal.         Judgment: Judgment normal.          Foot/Ankle Exam    GENERAL  Appearance:  appears stated age  Orientation:  AAOx3  Affect:  appropriate  Gait:  partial weight bearing  Right shoe gear: surgical shoe    VASCULAR     Right Foot Vascularity   Dorsalis pedis:  2+  Posterior tibial:  2+  Skin temperature:  warm  Edema grading:  Trace  CFT:  3  Pedal hair growth:  Present     Left Foot Vascularity   Dorsalis pedis:  2+  Posterior tibial:  2+  Skin temperature:  warm  Edema grading:  Trace  CFT:  3  Pedal hair growth:  Present     NEUROLOGIC     Right Foot Neurologic   Light touch sensation: diminished  Vibratory sensation: diminished  Hot/Cold sensation: diminished     Left Foot Neurologic   Light touch sensation: diminished  Vibratory sensation: diminished  Hot/Cold sensation:  diminished    MUSCULOSKELETAL     Right Foot Musculoskeletal   Ecchymosis:  none  Tenderness:  toe 1 tenderness and toe 2 tenderness    Arch:  Normal     Left Foot Musculoskeletal   Ecchymosis:  none  Tenderness:  none  Arch:  Normal    MUSCLE STRENGTH     Right Foot Muscle Strength   Foot dorsiflexion:  4+  Foot plantar flexion:  4+  Foot inversion:  4+  Foot eversion:  4+     Left Foot Muscle Strength   Foot dorsiflexion:  4+  Foot plantar flexion:  4+  Foot inversion:  4+  Foot eversion:  4+    RANGE OF MOTION     Right Foot Range of Motion   Foot and ankle ROM within normal limits       Left Foot Range of Motion   Foot and ankle ROM within  normal limits      DERMATOLOGIC      Right Foot Dermatologic   Skin  Positive for cellulitis.      Left Foot Dermatologic   Skin  Left foot skin is intact.      Right foot additional comments: Postop dressing intact.  No noted strikethrough or drainage today.           RADIOLOGY/NUCLEAR:  XR Toe 2+ View Right  Result Date: 4/30/2025  Narrative: XR TOE 2+ VW RIGHT- 4/30/2025 2:16 PM  HISTORY: post-op; G40.909-Epilepsy, unspecified, not intractable, without status epilepticus; S31.000A-Unspecified open wound of lower back and pelvis without penetration into retroperitoneum, initial encounter; L03.031-Cellulitis of right toe; A48.0-Gas gangrene; Z96.9-Presence of functional implant, unspecified; E11.59-Type 2 diabetes mellitus with other circulatory complications; Z79.4-Long term (curre  COMPARISON: 4/27/2025  FINDINGS: Frontal, lateral and oblique radiographs of the toes were provided for review.  Hardware from arthrodesis of the IP joint of the great toe is been removed. There is associated lucency and degenerative change at the first MTP joint and IP joint of the great toe. Patient is status post partial amputation of the second toe. There is mild osteopenia. No acute fracture is seen. Postoperative changes are noted in the soft tissues.      Impression:  1. Removal of hardware at the great toe with associated lucency and degenerative changes. 2. Partial amputation of the second toe.  This report was signed and finalized on 4/30/2025 4:10 PM by Hari Bonilla.      MRI Brain With & Without Contrast  Result Date: 4/28/2025  Narrative: MRI BRAIN W WO CONTRAST- 4/28/2025 6:20 AM  HISTORY: recurrent breakthrough seizures; G40.909-Epilepsy, unspecified, not intractable, without status epilepticus  TECHNIQUE: Multisequence, multiplanar MRI of the brain before and after the intravenous administration of Vueway contrast.  COMPARISON: Brain MRI dated 12/1/2017  FINDINGS:  No diffusion signal abnormality. No intra-axial or  extra-axial hemorrhage. No intracranial mass lesion or pathologic enhancement. Moderate chronic small vessel ischemic change. Hippocampal volumes appear symmetric. No obvious hippocampal sclerosis. Incidental tiny hippocampal cysts. Posterior fossa structures are unremarkable. Pituitary gland and sella are unremarkable. The major intracranial flow-voids are preserved. Orbital contents are unremarkable. The paranasal sinuses are clear. Partial right mastoid effusion. Normal marrow signal in the skull base and calvarium.      Impression:  1.  No acute intracranial process. 2.  No intracranial mass lesion or pathologic enhancement. 3.  No obvious hippocampal sclerosis. Hippocampal volumes and signal appear symmetric. 4.  Underlying moderate chronic small vessel ischemic change.  This report was signed and finalized on 4/28/2025 9:12 AM by Dr Merritt Reaves.      EEG  Result Date: 4/27/2025  Narrative: Reason for referral: 65 y.o.male with seizure Technical Summary:  A 19 channel digital EEG was performed using the international 10-20 placement system, including eye leads and EKG leads. Duration: 22 minutes Findings: The patient is asleep at the beginning of the study.  The background shows diffuse low amplitude theta present symmetrically over both hemispheres.  Toward the end of the study when the patient awakened there is an increase in faster theta and alpha frequencies.  A clear posterior rhythm is not seen.  Photic stimulation does not change the background.  Hyperventilation is not performed.  No focal features or epileptiform activity are present. Video: Available Technical quality: Good Rhythm strip: Regular, 60 bpm SUMMARY: Excessive drowsiness Otherwise unremarkable EEG in the mostly asleep and briefly awake states No focal features or epileptiform activity are seen     Impression: Normal study This report is transcribed using the Dragon dictation system.      XR Foot 2 View Right  Result Date:  4/27/2025  Narrative: XR FOOT 2 VW RIGHT-  HISTORY: right 2nd toe with wound and redness radiating up foot; G40.909-Epilepsy, unspecified, not intractable, without status epilepticus  COMPARISON: 7/27/2024  FINDINGS: 2 views of the right foot are obtained.  Surgical screw of the great toe from prior interphalangeal arthrodesis does demonstrate hardware loosening with mild retraction of the screw within the bony structure of the great toe. Advanced chronic arthritic changes of the first MTP and interphalangeal joint of the great toe.  There is soft tissue swelling of the distal right second toe no convincing radiographic evidence of underlying osteomyelitis. There is small soft tissue air/subcutaneous emphysema within the edematous distal right second toe which may correspond to a region ulcerative defect. Calcaneal spurring. Similar calcaneal osteotomy changes suspected. Mild arthritic change midfoot      Impression: 1. Soft tissue swelling and mild subcutaneous emphysema involving the soft tissues of the distal right second toe with no convincing radiographic evidence of underlying osteomyelitis. 2. Hardware loosening involving the surgical screw of the great toe with mild retraction of the surgical screw compared to 7/27/2024.  This report was signed and finalized on 4/27/2025 11:42 AM by Dr. Ml Estrada MD.      CT Head Without Contrast  Result Date: 4/26/2025  Narrative: EXAMINATION: CT HEAD WO CONTRAST-  4/26/2025 4:58 PM  HISTORY: seizures  COMPARISON: 9/21/2023  DLP: 1069.19 mGy.cm  In order to have a CT radiation dose as low as reasonably achievable, Automated Exposure Control was utilized for adjustment of the mA and/or KV according to patient size.  TECHNIQUE: Serial axial tomographic images of the brain were obtained without the use of intravenous contrast.  Coronal and sagittal reformatted images were obtained reviewed as well.  FINDINGS: No mass effect or midline shift. No acute hemorrhage. There is  mild low-attenuation in the subcortical and periventricular white matter as can be seen with chronic microvascular change. Vascular calcifications in the vertebral arteries in the intracranial portions of the carotid arteries.  Surrounding soft tissues are without acute findings. Orbits and globes are preserved. Paranasal sinus disease with an air-fluid level in the RIGHT maxillary sinus suggesting mild acute sinusitis. Mild scattered mucosal thickening in the ethmoid air cells. Small mastoid effusion on the RIGHT, unchanged from 2023 and likely chronic.       Impression:  1.  Mild atrophy and chronic white matter changes but no definite acute intracranial process.  2.  Paranasal sinus disease as discussed above.  This report was signed and finalized on 4/26/2025 5:00 PM by Dr. Valentino Lebron MD.      XR Chest 1 View  Result Date: 4/26/2025  Narrative: XR CHEST 1 VW-  HISTORY: rule out pneumonia; R56.9-Unspecified convulsions  COMPARISON: 12/26/2024  FINDINGS: Frontal view of the chest obtained.  Stable cardiomegaly with evidence of prior mediastinal surgery. Chronic mild prominence of pulmonary vascular structures with no acute consolidation. No pleural effusion or pneumothorax. No acute regional bony pathology.      Impression: 1. Prior mediastinal surgery with stable cardiomegaly and mild chronic change. No acute abnormality.   This report was signed and finalized on 4/26/2025 10:14 AM by Dr. Ml Estrada MD.      CT Angiogram Chest  Result Date: 4/9/2025  Narrative: EXAMINATION: CT ANGIOGRAM CHEST-   4/9/2025 8:42 AM  HISTORY: Ascending Aortic Ansurysm; I71.21-Aneurysm of the ascending aorta, without rupture  In order to have a CT radiation dose as low as reasonably achievable Automated Exposure Control was utilized for adjustment of the mA and/or KV according to patient size.  CT Dose DLP = 420.13 mGy.cm. (If there are multiple studies performed at the same time this represents the total dose).  Images are  stored in PACS per institutional protocol.  CT angiogram chest with IV contrast injection. CT angiography protocol. CT imaging with bolus IV contrast injection. Under concurrent supervision axial, sagittal, coronal, three-dimensional, and MIP data sets were constructed on an independent work station.  Comparison: 2/14/2024.  Heart size is at the upper limits of normal. Median sternotomy changes and coronary artery calcification. Symmetric and normally opacified pulmonary arteries. Unchanged 14 mm linear metal structure within the LEFT pulmonary artery. No mediastinal mass. Normal CT appearance of the thyroid gland.  Stable dilated ascending thoracic aorta measuring 41 x 45 mm on axial image 66. Stable 34 mm aortic root measurement on coronal image 53. Stable 38 mm diameter of the distal ascending aorta near the innominate artery origin. No dissection.  Moderate degenerative disc and endplate change throughout the thoracic spine. Multiple (approximately 10) tiny scattered lung nodules are unchanged.      Impression: 1. Stable size dilated ascending thoracic aorta. 2. No significant change from last year.  This report was signed and finalized on 4/9/2025 10:32 AM by Dr. Celso Wynn MD.        LABORATORY/CULTURE RESULTS:  Results from last 7 days   Lab Units 05/01/25  0242 04/30/25  0319 04/29/25  0841   WBC 10*3/mm3 5.36 3.91 3.35*   HEMOGLOBIN g/dL 13.4 13.2 13.5   HEMATOCRIT % 40.8 39.8 41.0   PLATELETS 10*3/mm3 91* 85* 79*     Results from last 7 days   Lab Units 05/01/25  0242 04/30/25  0319 04/29/25  0841 04/28/25  0515 04/27/25  0409 04/26/25  1652   SODIUM mmol/L 133* 138 136 136 136 136   POTASSIUM mmol/L 4.1 4.6 4.2 4.0 4.9 4.5   CHLORIDE mmol/L 105 104 103 104 99 100   CO2 mmol/L 21.0* 22.0 21.0* 21.0* 23.0 23.0   BUN mg/dL 16 21 19 20 15 15   CREATININE mg/dL 0.87 0.87 0.90 0.93 1.02 1.10   CALCIUM mg/dL 8.5* 8.8 8.8 8.6 8.9 9.3   BILIRUBIN mg/dL  --   --   --  0.9 2.0* 1.6*   ALK PHOS U/L  --   --   --   59 63 64   ALT (SGPT) U/L  --   --   --  23 26 25   AST (SGOT) U/L  --   --   --  27 42* 31   GLUCOSE mg/dL 205* 213* 201* 140* 177* 164*         Microbiology Results (last 10 days)       Procedure Component Value - Date/Time    Blood Culture With STACY - Blood, Arm, Left [[id number]]  (Normal) Collected: 04/27/25 0850    Lab Status: Preliminary result Specimen: Blood from Arm, Left Updated: 05/01/25 1016     Blood Culture No growth at 4 days    Blood Culture With STACY - Blood, Arm, Right [[id number]]  (Normal) Collected: 04/27/25 0850    Lab Status: Preliminary result Specimen: Blood from Arm, Right Updated: 05/01/25 1016     Blood Culture No growth at 4 days    Respiratory Panel PCR w/COVID-19(SARS-CoV-2) [facility]I/[facility] In-House, NP Swab in UTM/VTM, 2 HR TAT - Swab, Nasopharynx [[id number]]  (Normal) Collected: 04/26/25 0050    Lab Status: Final result Specimen: Swab from Nasopharynx Updated: 04/26/25 0145     ADENOVIRUS, PCR Not Detected     Coronavirus 229E Not Detected     Coronavirus HKU1 Not Detected     Coronavirus NL63 Not Detected     Coronavirus OC43 Not Detected     COVID19 Not Detected     Human Metapneumovirus Not Detected     Human Rhinovirus/Enterovirus Not Detected     Influenza A PCR Not Detected     Influenza B PCR Not Detected     Parainfluenza Virus 1 Not Detected     Parainfluenza Virus 2 Not Detected     Parainfluenza Virus 3 Not Detected     Parainfluenza Virus 4 Not Detected     RSV, PCR Not Detected     Bordetella pertussis pcr Not Detected     Bordetella parapertussis PCR Not Detected     Chlamydophila pneumoniae PCR Not Detected     Mycoplasma pneumo by PCR Not Detected    Narrative:      In the setting of a positive respiratory panel with a viral infection PLUS a negative procalcitonin without other underlying concern for bacterial infection, consider observing off antibiotics or discontinuation of antibiotics and continue supportive care. If the respiratory panel is positive for  atypical bacterial infection (Bordetella pertussis, Chlamydophila pneumoniae, or Mycoplasma pneumoniae), consider antibiotic de-escalation to target atypical bacterial infection.            PATHOLOGY RESULTS:       ASSESSMENT/PLAN   Status post right hallux hardware removal and right second toe partial amputation  DM2 with Neuropathy  Seizure    Review of labs, imaging, and other provider documentation  Comprehensive lower extremity examination and evaluation was performed.    Surgical dressing in place.  Dressing will be changed by podiatry APRN tomorrow.    Patient should continue to be nonweightbearing to his right foot.  Patient should continue to use postop shoe to help with offloading.    Patient will follow-up in outpatient podiatry in 1 week.     We will continue to follow patient while he is admitted.    This document has been electronically signed by TEENA Nice on May 1, 2025 13:03 CDT

## 2025-05-01 NOTE — PLAN OF CARE
Goal Outcome Evaluation:  Plan of Care Reviewed With: patient        Progress: no change  Outcome Evaluation: The patient presents lying in bed with surgerical shoe on his R foot. He reports that he has already been up to the restroom with nursing. There is no walker or assistive device in the room. I ask him how he maintained his NWB status and he said with the assist of staff. I spoke with staff and they report that he just walked normally and there was no stopping him. I brought a walker to his room and explained in detail what NWB means and voiced understanding. He demonstrated full weight bearing when goind sit to stand. Again, I explained what NWB meant. He ambulated with weight through his R heel. I explained that this was better than full weight bearing but still not NWB. He then proceeded to full weight bear again and walk to the chair. The patient is noncomplaint with his NWB status, but does not understand that he is he being noncomplaint. PT will continue to work with him to try to continue to explain his weight bearing restriction. Recommend discharge to SNF vs home with assist. His wife works during the day so he would be home alone during the day.    Anticipated Discharge Disposition (PT): home with assist, skilled nursing facility

## 2025-05-01 NOTE — PROGRESS NOTES
[facility] Medicine Services  INPATIENT PROGRESS NOTE    Patient Name: Carlos A Boyer Jr.  Date of Admission: 4/26/2025  Today's Date: 05/01/25  Length of Stay: 5  Primary Care Physician: Vinayak Correia PA    Subjective   Chief Complaint: Right foot pain    No acute events overnight.    POD 1 s/p right second toe amputation.  He does not have any complaints at this time.    Review of Systems   All pertinent negatives and positives are as above. All other systems have been reviewed and are negative unless otherwise stated.     Objective    Temp:  [97.1 °F (36.2 °C)-98.2 °F (36.8 °C)] 97.7 °F (36.5 °C)  Heart Rate:  [56-66] 57  Resp:  [9-20] 18  BP: (112-140)/() 133/67  Physical Exam  GEN: Awake, alert, interactive, in NAD on room air   HEENT: Atraumatic, EOMI, Anicteric  Lungs: CTAB  Heart: RRR  ABD: soft, nt/nd, +BS, no guarding/rebound  Extremities: right second digit wound   Skin: no rashes or lesions  Neuro: AAOx3, no focal deficits  Psych: normal mood & affect      Results Review:  I have reviewed the labs, radiology results, and diagnostic studies.    Laboratory Data:   Results from last 7 days   Lab Units 05/01/25  0242 04/30/25 0319 04/29/25  0841   WBC 10*3/mm3 5.36 3.91 3.35*   HEMOGLOBIN g/dL 13.4 13.2 13.5   HEMATOCRIT % 40.8 39.8 41.0   PLATELETS 10*3/mm3 91* 85* 79*        Results from last 7 days   Lab Units 05/01/25  0242 04/30/25  0319 04/29/25  0841 04/28/25  0515 04/27/25  0409 04/26/25  1652   SODIUM mmol/L 133* 138 136 136 136 136   POTASSIUM mmol/L 4.1 4.6 4.2 4.0 4.9 4.5   CHLORIDE mmol/L 105 104 103 104 99 100   CO2 mmol/L 21.0* 22.0 21.0* 21.0* 23.0 23.0   BUN mg/dL 16 21 19 20 15 15   CREATININE mg/dL 0.87 0.87 0.90 0.93 1.02 1.10   CALCIUM mg/dL 8.5* 8.8 8.8 8.6 8.9 9.3   BILIRUBIN mg/dL  --   --   --  0.9 2.0* 1.6*   ALK PHOS U/L  --   --   --  59 63 64   ALT (SGPT) U/L  --   --   --  23 26 25   AST (SGOT) U/L  --   --   --  27 42* 31    GLUCOSE mg/dL 205* 213* 201* 140* 177* 164*       Culture Data:   [unfilled]    Radiology Data:   Imaging Results (Last 24 Hours)       Procedure Component Value Units Date/Time    XR Toe 2+ View Right [[id number]] Collected: 04/30/25 1607     Updated: 04/30/25 1613    Narrative:      XR TOE 2+ VW RIGHT- 4/30/2025 2:16 PM     HISTORY: post-op; G40.909-Epilepsy, unspecified, not intractable,  without status epilepticus; S31.000A-Unspecified open wound of lower  back and pelvis without penetration into retroperitoneum, initial  encounter; L03.031-Cellulitis of right toe; A48.0-Gas gangrene;  Z96.9-Presence of functional implant, unspecified; E11.59-Type 2  diabetes mellitus with other circulatory complications; Z79.4-Long term  (curre     COMPARISON: 4/27/2025     FINDINGS:  Frontal, lateral and oblique radiographs of the toes were provided for  review.     Hardware from arthrodesis of the IP joint of the great toe is been  removed. There is associated lucency and degenerative change at the  first MTP joint and IP joint of the great toe. Patient is status post  partial amputation of the second toe. There is mild osteopenia. No acute  fracture is seen. Postoperative changes are noted in the soft tissues.       Impression:         1. Removal of hardware at the great toe with associated lucency and  degenerative changes.  2. Partial amputation of the second toe.     This report was signed and finalized on 4/30/2025 4:10 PM by Hari Bonilla.               I have reviewed the patient's current medications.     Assessment/Plan   Assessment  Active Hospital Problems    Diagnosis     **Recurrent seizures     Gas gangrene of foot     Retained orthopedic hardware     Chronic heart failure with preserved ejection fraction (HFpEF)     Cellulitis of right toe     Presence of CardioMEMS HF system     S/P CABG x 2     Chronic diastolic congestive heart failure with preserved ejection fraction     Atrial flutter     Class 1  obesity due to excess calories with serious comorbidity and body mass index (BMI) of 34.0 to 34.9 in adult     Diabetic peripheral neuropathy     Diabetic complication     Deviated nasal septum     Maribell bullosa     Benign non-nodular prostatic hyperplasia with lower urinary tract symptoms     Gastroesophageal reflux disease without esophagitis     Type 2 diabetes mellitus with circulatory disorder, with long-term current use of insulin        Treatment Plan    # Recurrent seizures  Reported 5 seizures at home, adherence to meds  Previously, last breakthrough seizure was > 3 years ago  CT Head: Mild atrophy and chronic white matter changes but no definite acute intracranial process. Paranasal sinus disease with an air-fluid level in the right maxillary sinus suggesting mild acute sinusitis. Mild scattered mucosal thickening in the ethmoid air cells.  MRI brain: No acute findings.  - Continue levetiracetam and lamotrigine  - Neurology no longer following , he will need to follow up in 6-8 weeks  - seizure precautions     # Right Foot Infection s/p right second toe amputation 4/30/25  Tmax 101.5F on 4/26  Foot x-ray showing soft tissue swelling, mild emphysema and hardware loosening.    - Could possibly stop antibiotics soon   - Follow-up blood cultures, no growth day 4  - pain management  - Hoping for discharge tomorrow, pending podiatry clearance  - Patient refusing home health due to large dog in his home.  He would like outpatient physical therapy instead.    # HFpEF s/p cardioMEMS 5/2023  # CAD s/p CABG  # Atrial flutter - on Eliquis   -Continue Imdur, statin, Lopressor, and Eliquis    # HOA - on CPAP    # Type II Diabetes with Neuropathy - HgbA1c 7.6%  - Continue Lantus 25 every 12h and SSI  - Refusing some insulin doses  - Increasing SSI dose today  - Continue Jardiance, gabapentin    # Class I Obesity - Body mass index is 32.42 kg/m².     DVT prophylaxis: Home Eliquis  GI prophylaxis: Protonix    Medical  Decision Making  Number and Complexity of problems: 1 acute moderate complexity, 1 acute on chronic moderate complexity, others chronic and stable   Differential Diagnosis: As above    Conditions and Status        Stable. Improving.      [facility] Data  External documents reviewed: Reviewed  Cardiac tracing (EKG, telemetry) interpretation: Reviewed  Radiology interpretation: Reviewed  Labs reviewed: As above  Any tests that were considered but not ordered: None     Decision rules/scores evaluated (example [id number], Wells, etc): None     Discussed with: Patient and RN     Care Planning  Shared decision making: Patient apprised of current labs, vitals, imaging and treatment plan.  They are agreeable with proceeding with plans as discussed.   Code status and discussions: Full code    Disposition  Social Determinants of Health that impact treatment or disposition: None  I expect the patient to be discharged to home with outpatient PT in 1-2 days.         Electronically signed by Angela Cesar MD, 05/01/25, 09:43 CDT.

## 2025-05-01 NOTE — PLAN OF CARE
Goal Outcome Evaluation:  Plan of Care Reviewed With: patient, spouse        Progress: no change  Outcome Evaluation: Patient denies pain so far this shift, dressing to foot C/D/I boot in place, cont. NWB to RLE at this time, cont accu checks with SSI given per orders, voiding per urinal, tolerating diet, wife at bedside

## 2025-05-01 NOTE — THERAPY RE-EVALUATION
Patient Name: Carlos A Boyer Jr.  : 1960    MRN: [id number]                              Today's Date: 2025       Admit Date: 2025    Visit Dx:     ICD-10-CM ICD-9-CM   1. Recurrent seizures  G40.909 345.80   2. Wound of sacral region, initial encounter  S31.000A 959.19   3. Cellulitis of right toe  L03.031 681.10   4. Gas gangrene of foot  A48.0 040.0   5. Retained orthopedic hardware  Z96.9 V45.89   6. Type 2 diabetes mellitus with other circulatory complication, with long-term current use of insulin  E11.59 250.70    Z79.4 V58.67   7. Impaired mobility [Z74.09]  Z74.09 799.89     Patient Active Problem List   Diagnosis    Type 2 diabetes mellitus with circulatory disorder, with long-term current use of insulin    Gastroesophageal reflux disease without esophagitis    Primary hypertension    Seizure disorder    Carotid disease, bilateral    Coronary artery disease involving native coronary artery of native heart without angina pectoris    Hyperlipidemia LDL goal <70    Chronic left arterial ischemic stroke, ICA (internal carotid artery)    HOA on CPAP    Benign non-nodular prostatic hyperplasia with lower urinary tract symptoms    Deviated nasal septum    Maribell bullosa    Hypertrophy of both inferior nasal turbinates    Chronic sinusitis of both maxillary sinuses    S/P FESS (functional endoscopic sinus surgery)    Diabetic complication    Epigastric pain    Diabetic peripheral neuropathy    Class 1 obesity due to excess calories with serious comorbidity and body mass index (BMI) of 34.0 to 34.9 in adult    Aspirin-like platelet function defect    History of seizure    Lung nodules    Cardiac murmur    Thoracic aortic aneurysm without rupture    Paroxysmal atrial fibrillation    Atrial flutter    Chronic diastolic congestive heart failure with preserved ejection fraction    Fluid overload    Bilateral pleural effusion    Respiratory distress    Long-term use of high-risk medication    Dysphagia     Left lower quadrant abdominal pain    COVID-19 virus detected    Shortness of breath    S/P CABG x 2    Chronic anticoagulation    Old complex tear of lateral meniscus of right knee    BPH with obstruction/lower urinary tract symptoms    Daytime somnolence    Snoring    Aneurysm of ascending aorta without rupture    Presence of CardioMEMS HF system    Chest pain    Non-pressure chronic ulcer of other part of right foot with fat layer exposed    Diabetic ulcer of toe of right foot associated with type 2 diabetes mellitus, with fat layer exposed    Deformity, toe acquired, right    Cellulitis of right toe    Chronic heart failure with preserved ejection fraction (HFpEF)    Recurrent seizures    Gas gangrene of foot    Retained orthopedic hardware     Past Medical History:   Diagnosis Date    Arthritis     Asthma     Cancer     skin    Carotid disease, bilateral     Cellulitis     right foot - on antibiotics 6-    Chest pain     Chronic diastolic congestive heart failure 09/07/2019    Chronic sinusitis     Maribell bullosa     Coronary artery disease involving native coronary artery of native heart with unstable angina pectoris 01/17/2017    Deviated septum     Diabetes mellitus     Difficulty urinating     Diverticulitis     Enlarged prostate     Fatty liver     GERD (gastroesophageal reflux disease)     [place] (hard of hearing)     Does have hearing aids    Hyperlipidemia LDL goal <70 02/02/2017    Hypertrophy of nasal turbinates     Keratoderma     Kidney stone     Lung nodules     lower right lung    Migraine     Murmur, heart     Myocardial infarction     Obesity     Paroxysmal atrial fibrillation 07/11/2019    Personal history of COVID-19 07/2021    PONV (postoperative nausea and vomiting)     Primary hypertension 10/16/2016    Psoriasis     Seizures     Sinus congestion     Skin cancer     Sleep apnea     not using cpap    SOB (shortness of breath)     Stroke     mild weakness on right side    UTI (urinary  tract infection)      Past Surgical History:   Procedure Laterality Date    AMPUTATION DIGIT Right 4/30/2025    Procedure: Partial vs Complete Amputation of 2nd Toe, Hardware Removal from Hallux - Right Foot;  Surgeon: Hernan Villa DPM;  Location: [facility] OR;  Service: Podiatry;  Laterality: Right;    CARDIAC CATHETERIZATION  01/2016    Dr. Broadbent; widely patent previously placed stents in the left anterior descending and obstructive disease involving the diagonal branch which was treated medically    CARDIAC CATHETERIZATION N/A 07/14/2017    Procedure: Left Heart Cath;  Surgeon: Wade Ramey MD;  Location:  [facility];  Service:     CARDIAC CATHETERIZATION Left 10/15/2018    Procedure: Cardiac Catheterization/Vascular Study;  Surgeon: Wade Ramey MD;  Location:  [facility];  Service: Cardiology    CARDIAC CATHETERIZATION  10/15/2018    Procedure: Functional Flow [place];  Surgeon: Wade Ramey MD;  Location:  [facility];  Service: Cardiology    CARDIAC CATHETERIZATION N/A 10/15/2018    Procedure: Left ventriculography;  Surgeon: Wade Ramey MD;  Location:  [facility];  Service: Cardiology    CARDIAC CATHETERIZATION Left 06/26/2019    Procedure: Cardiac Catheterization/Vascular Study VEL OK  HE WILL WAIT 1 YEAR FOR SHOULDER SURGERY ;  Surgeon: Wade Ramey MD;  Location:  [facility];  Service: Cardiology    CARDIAC CATHETERIZATION Left 04/30/2021    Procedure: Coronary angiography;  Surgeon: Sahil Llamas MD;  Location:  [facility];  Service: Cardiology;  Laterality: Left;    CARDIAC CATHETERIZATION N/A 04/30/2021    Procedure: Percutaneous Coronary Intervention;  Surgeon: Sahil Llamas MD;  Location:  [facility];  Service: Cardiology;  Laterality: N/A;    CARDIAC CATHETERIZATION N/A 11/09/2022    Procedure: Left Heart Cath with SVGs;  Surgeon: Wade Ramey MD;  Location:   [facility];  Service: Cardiology;  Laterality: N/A;    CARPAL TUNNEL RELEASE      January 2024 and pther hand February 2024    CHOLECYSTECTOMY      CHOLECYSTECTOMY WITH INTRAOPERATIVE CHOLANGIOGRAM N/A 08/01/2018    Procedure: CHOLECYSTECTOMY LAPAROSCOPIC INTRAOPERATIVE CHOLANGIOGRAM;  Surgeon: Shane Ann MD;  Location: [facility] OR;  Service: General    COLONOSCOPY N/A 07/14/2020    Procedure: COLONOSCOPY WITH ANESTHESIA;  Surgeon: Anupam Morales DO;  Location: [facility] ENDOSCOPY;  Service: Gastroenterology;  Laterality: N/A;  pre: abdominal pain  post: diverticulosis  Vinayak Correia PA    CORONARY ANGIOPLASTY      CORONARY ARTERY BYPASS GRAFT N/A 07/06/2019    Procedure: CABG X2 WITH LIMA, LEFT LEG OVH, AND PLACEMENT OF LEFT FEMORAL ARTERIAL LINE;  Surgeon: Steven Tang MD;  Location: [facility] OR;  Service: Cardiothoracic    CORONARY STENT PLACEMENT      x 6    CYSTOSCOPY TRANSURETHRAL RESECTION OF PROSTATE N/A 01/23/2023    Procedure: CYSTOSCOPY TRANSURETHRAL RESECTION OF PROSTATE;  Surgeon: Latrell Pope MD;  Location: [facility] OR;  Service: Urology;  Laterality: N/A;    ENDOSCOPIC FUNCTIONAL SINUS SURGERY (FESS) Bilateral 12/13/2017    Procedure: PROCEDURE PERFORMED:  Bilateral functional endoscopic anterior ethmoidectomy with bilateral middle meatal antrostomy Septoplasty Right kathia bullosa resection Bilateral inferior turbinate reduction via [place];  Surgeon: Mayank Ibarra MD;  Location: [facility] OR;  Service:     ENDOSCOPY N/A 07/30/2018    Procedure: ESOPHAGOGASTRODUODENOSCOPY WITH ANESTHESIA;  Surgeon: Benitez Mas MD;  Location: [facility] ENDOSCOPY;  Service: Gastroenterology    ENDOSCOPY N/A 07/14/2020    Procedure: ESOPHAGOGASTRODUODENOSCOPY WITH ANESTHESIA;  Surgeon: Anupam Morales DO;  Location: [facility] ENDOSCOPY;  Service: Gastroenterology;  Laterality: N/A;  pre: abdominal pain  post: esophagitis  Vinayak Correia PA    HARDWARE REMOVAL Right  4/30/2025    Procedure: Hardware Removal from Hallux - Right Foot;  Surgeon: Hernan Villa DPM;  Location:  [facility] OR;  Service: Podiatry;  Laterality: Right;    HERNIA REPAIR      x2 inguinal area    KIDNEY STONE SURGERY      KNEE ARTHROSCOPY Right 03/01/2022    Procedure: RIGHT KNEE PARTIAL LATERAL MENISCECTOMY;  Surgeon: Pedro Pablo Song MD;  Location:  [facility] OR;  Service: Orthopedics;  Laterality: Right;    KNEE SURGERY Right     OTHER SURGICAL HISTORY      urolift    PROSTATE SURGERY      Dr. Badillo - 2017    PULMONARY ARTERY PRESSURE SENSOR IMPLANT N/A 05/08/2023    Procedure: PA Pressure Sensor Implant;  Surgeon: Wade Ramey MD;  Location:  [facility];  Service: Cardiology;  Laterality: N/A;    ROTATOR CUFF REPAIR Right     SLEEP ENDOSCOPY N/A 02/27/2023    Procedure: Videosleep endoscopy;  Surgeon: Mayank Ibarra MD;  Location:  [facility] OR;  Service: ENT;  Laterality: N/A;    THUMB AMPUTATION Left     partial    TOE FUSION Right 7/3/2024    Procedure: Hallux Interphalangeal Joint Arthrodesis, Bone Graft [place] - Right Foot;  Surgeon: Hernan Villa DPM;  Location:  [facility] OR;  Service: Podiatry;  Laterality: Right;    TOE SURGERY      great toe      General Information       Row Name 05/01/25 1032          Physical Therapy Time and Intention    Document Type re-evaluation  s/p amputation of second toe on R due to gas gangrene of foot  -MS     Mode of Treatment physical therapy;individual therapy  -MS       Row Name 05/01/25 1032          General Information    Patient Profile Reviewed yes  -MS     Prior Level of Function independent:;all household mobility;community mobility;ADL's;driving;work  -MS     Existing Precautions/Restrictions seizures;non-weight bearing  NWB R foot  -MS     Barriers to Rehab medically complex;previous functional deficit;physical barrier  -MS       Row Name 05/01/25 1032          Living Environment    Current Living Arrangements home  -MS      People in Home spouse  -MS       Row Name 05/01/25 1032          Home Main Entrance    Number of Stairs, Main Entrance five  -MS     Stair Railings, Main Entrance railings on both sides of stairs  -MS       Row Name 05/01/25 1032          Stairs Within Home, Primary    Number of Stairs, Within Home, Primary none  -MS       Row Name 05/01/25 1032          Cognition    Orientation Status (Cognition) oriented x 4  -MS       Row Name 05/01/25 1032          Safety Issues/Impairments Affecting Functional Mobility    Safety Issues Affecting Function (Mobility) insight into deficits/self-awareness;judgment;positioning of assistive device;impulsivity;problem-solving;safety precaution awareness;safety precautions follow-through/compliance;sequencing abilities;unable to maintain weight-bearing restrictions   -MS     Impairments Affecting Function (Mobility) balance;endurance/activity tolerance  -MS               User Key  (r) = Recorded By, (t) = Taken By, (c) = Cosigned By      Initials Name Provider Type    MS Nidia Fulton, PT, DPT, NCS Physical Therapist                   Mobility       Row Name 05/01/25 1035          Bed Mobility    Bed Mobility sit-supine  -MS     Supine-Sit [place] (Bed Mobility) independent  -MS     Sit-Supine [place] (Bed Mobility) independent  -MS       Row Name 05/01/25 1035          Sit-Stand Transfer    Sit-Stand [place] (Transfers) modified independence  -MS     Assistive Device (Sit-Stand Transfers) walker, front-wheeled  -MS     Comment, (Sit-Stand Transfer) pt unable to maintain NWB on R LE   -MS       Row Name 05/01/25 1035          Gait/Stairs (Locomotion)    [place] Level (Gait) contact guard;verbal cues  -MS     Assistive Device (Gait) walker, front-wheeled  -MS     Distance in Feet (Gait) 15  -MS     Maintains Weight-bearing Status (Gait) unable to maintain  -MS     Comment, (Gait/Stairs) pt believes he is maintaining his NWB status. He places weight through his R  heel at times and he thinks this is ok. I explain this is not was NWB is. He then proceeds for full weight bear through his R foot  -MS       Row Name 05/01/25 1035          Mobility    Extremity Weight-bearing Status right lower extremity  -MS     Right Lower Extremity (Weight-bearing Status) non weight-bearing (NWB)  -MS               User Key  (r) = Recorded By, (t) = Taken By, (c) = Cosigned By      Initials Name Provider Type    Nidia Machuca INDU, PT, DPT, NCS Physical Therapist                   Obj/Interventions       Row Name 05/01/25 1030          Range of Motion Comprehensive    General Range of Motion bilateral upper extremity ROM WFL;bilateral lower extremity ROM WFL  -MS       Row Name 05/01/25 1030          Strength Comprehensive (MMT)    Comment, General Manual Muscle Testing (MMT) Assessment B UE strength grossly 4+/5, L LE grossly 5/5, R LE defered  -MS       Row Name 05/01/25 1030          Balance    Balance Assessment sitting static balance;sitting dynamic balance;standing static balance;standing dynamic balance  -MS     Static Sitting Balance independent  -MS     Dynamic Sitting Balance independent  -MS     Position, Sitting Balance unsupported;sitting edge of bed  -MS     Static Standing Balance contact guard  -MS     Dynamic Standing Balance contact guard  -MS     Position/Device Used, Standing Balance walker, front-wheeled  -MS               User Key  (r) = Recorded By, (t) = Taken By, (c) = Cosigned By      Initials Name Provider Type    MS Fulton Nidia GRIGGS, PT, DPT, NCS Physical Therapist                   Goals/Plan       Row Name 05/01/25 1030          Transfer Goal 1 (PT)    Activity/Assistive Device (Transfer Goal 1, PT) sit-to-stand/stand-to-sit;bed-to-chair/chair-to-bed;walker, rolling  -MS     [place] Level/Cues Needed (Transfer Goal 1, PT) modified independence  -MS     Time Frame (Transfer Goal 1, PT) long term goal (LTG);by discharge  -MS     Strategies/Barriers (Transfers  Goal 1, PT) maintain NWB status  -MS     Progress/Outcome (Transfer Goal 1, PT) new goal  -MS       Row Name 05/01/25 1030          Gait Training Goal 1 (PT)    Activity/Assistive Device (Gait Training Goal 1, PT) gait (walking locomotion);assistive device use;decrease fall risk;increase endurance/gait distance;improve balance and speed;walker, rolling  -MS     [place] Level (Gait Training Goal 1, PT) modified independence  -MS     Distance (Gait Training Goal 1, PT) 25ft maintaining NWB status  -MS     Time Frame (Gait Training Goal 1, PT) long term goal (LTG);by discharge  -MS     Progress/Outcome (Gait Training Goal 1, PT) new goal  -MS       Row Name 05/01/25 1030          Stairs Goal 1 (PT)    Activity/Assistive Device (Stairs Goal 1, PT) ascending stairs;descending stairs;using handrail, left;step-to-step  -MS     [place] Level/Cues Needed (Stairs Goal 1, PT) contact guard required;tactile cues required;verbal cues required  -MS     Number of Stairs (Stairs Goal 1, PT) 5  -MS     Time Frame (Stairs Goal 1, PT) long term goal (LTG);by discharge  -MS     Progress/Outcome (Stairs Goal 1, PT) new goal  -MS       Row Name 05/01/25 1030          Therapy Assessment/Plan (PT)    Planned Therapy Interventions (PT) balance training;bed mobility training;gait training;patient/family education;strengthening;stair training;transfer training  -MS               User Key  (r) = Recorded By, (t) = Taken By, (c) = Cosigned By      Initials Name Provider Type    Nidia Machuca, PT, DPT, NCS Physical Therapist                   Clinical Impression       Row Name 05/01/25 1030          Pain    Pretreatment Pain Rating 7/10  -MS     Posttreatment Pain Rating 7/10  -MS     Pain Location foot  -MS     Pain Side/Orientation left  -MS     Pain Management Interventions activity modification encouraged;exercise or physical activity utilized;premedicated for activity  -MS     Response to Pain Interventions no change per  patient report  -MS       Row Name 05/01/25 1030          Plan of Care Review    Plan of Care Reviewed With patient  -MS     Progress no change  -MS     Outcome Evaluation The patient presents lying in bed with surgerical shoe on his R foot. He reports that he has already been up to the restroom with nursing. There is no walker or assistive device in the room. I ask him how he maintained his NWB status and he said with the assist of staff. I spoke with staff and they report that he just walked normally and there was no stopping him. I brought a walker to his room and explained in detail what NWB means and voiced understanding. He demonstrated full weight bearing when goind sit to stand. Again, I explained what NWB meant. He ambulated with weight through his R heel. I explained that this was better than full weight bearing but still not NWB. He then proceeded to full weight bear again and walk to the chair. The patient is noncomplaint with his NWB status, but does not understand that he is he being noncomplaint. PT will continue to work with him to try to continue to explain his weight bearing restriction. Recommend discharge to SNF vs home with assist. His wife works during the day so he would be home alone during the day.  -MS       Row Name 05/01/25 1030          Therapy Assessment/Plan (PT)    Patient/Family Therapy Goals Statement (PT) go home  -MS     Rehab Potential (PT) good  -MS     Criteria for Skilled Interventions Met (PT) yes;meets criteria;skilled treatment is necessary  -MS     Therapy Frequency (PT) 2 times/day  -MS     Predicted Duration of Therapy Intervention (PT) until discharge  -MS       Row Name 05/01/25 1030          Positioning and Restraints    Post Treatment Position chair  -MS     In Chair sitting;call light within reach;encouraged to call for assist  -MS               User Key  (r) = Recorded By, (t) = Taken By, (c) = Cosigned By      Initials Name Provider Type    Nidia Machuca,  PT, DPT, NCS Physical Therapist                   Outcome Measures       Row Name 05/01/25 1030 05/01/25 0840       How much help from another person do you currently need...    Turning from your back to your side while in flat bed without using bedrails? 4  -MS 4  -SS    Moving from lying on back to sitting on the side of a flat bed without bedrails? 4  -MS 4  -SS    Moving to and from a bed to a chair (including a wheelchair)? 3  -MS 3  -SS    Standing up from a chair using your arms (e.g., wheelchair, bedside chair)? 3  -MS 3  -SS    Climbing 3-5 steps with a railing? 3  -MS 3  -SS    To walk in hospital room? 3  -MS 3  -SS    AM-PAC 6 Clicks Score (PT) 20  -MS 20  -SS    Highest Level of Mobility Goal 6 --> Walk 10 steps or more  -MS 6 --> Walk 10 steps or more  -SS      Row Name 05/01/25 1030          Functional Assessment    Outcome Measure Options AM-PAC 6 Clicks Basic Mobility (PT)  -MS               User Key  (r) = Recorded By, (t) = Taken By, (c) = Cosigned By      Initials Name Provider Type    MS Donaldo Nidia R, PT, DPT, NCS Physical Therapist    SS Liz Bird, RN Registered Nurse                                 Physical Therapy Education       Title: PT OT SLP Therapies (In Progress)       Topic: Physical Therapy (In Progress)       Point: Mobility training (In Progress)       Learning Progress Summary            Patient Acceptance, E, NR by MS at 5/1/2025 1142    Comment: role of PT in his care, NWB status                      Point: Home exercise program (Not Started)       Learner Progress:  Not documented in this visit.              Point: Body mechanics (Not Started)       Learner Progress:  Not documented in this visit.              Point: Precautions (In Progress)       Learning Progress Summary            Patient Acceptance, E, NR by MS at 5/1/2025 1142    Comment: role of PT in his care, NWB status                                      User Key       Initials Effective Dates Name Provider  Type Discipline    MS 07/11/23 -  Donaldo Nidia R, PT, DPT, NCS Physical Therapist PT                  PT Recommendation and Plan  Planned Therapy Interventions (PT): balance training, bed mobility training, gait training, patient/family education, strengthening, stair training, transfer training  Progress: no change  Outcome Evaluation: The patient presents lying in bed with surgerical shoe on his R foot. He reports that he has already been up to the restroom with nursing. There is no walker or assistive device in the room. I ask him how he maintained his NWB status and he said with the assist of staff. I spoke with staff and they report that he just walked normally and there was no stopping him. I brought a walker to his room and explained in detail what NWB means and voiced understanding. He demonstrated full weight bearing when goind sit to stand. Again, I explained what NWB meant. He ambulated with weight through his R heel. I explained that this was better than full weight bearing but still not NWB. He then proceeded to full weight bear again and walk to the chair. The patient is noncomplaint with his NWB status, but does not understand that he is he being noncomplaint. PT will continue to work with him to try to continue to explain his weight bearing restriction. Recommend discharge to SNF vs home with assist. His wife works during the day so he would be home alone during the day.     Time Calculation:         PT Charges       Row Name 05/01/25 1030             Time Calculation    Start Time 1030  -MS      Stop Time 1100  -MS      Time Calculation (min) 30 min  -MS      PT Received On 05/01/25  -MS      PT Goal Re-Cert Due Date 05/11/25  -MS         Untimed Charges    PT Eval/Re-eval Minutes 30  -MS         Total Minutes    Untimed Charges Total Minutes 30  -MS       Total Minutes 30  -MS                User Key  (r) = Recorded By, (t) = Taken By, (c) = Cosigned By      Initials Name Provider Type    MS  Nidia Fulton, PT, DPT, NCS Physical Therapist                      PT G-Codes  Outcome Measure Options: AM-PAC 6 Clicks Basic Mobility (PT)  AM-PAC 6 Clicks Score (PT): 20  AM-PAC 6 Clicks Score (OT): 24  PT Discharge Summary  Anticipated Discharge Disposition (PT): home with assist, skilled nursing facility    Nidia Fulton, PT, DPT, NCS  5/1/2025

## 2025-05-01 NOTE — CASE MANAGEMENT/SOCIAL WORK
Continued Stay Note   Charla     Patient Name: Carlos A Boyer Jr.  MRN: [id number]  Today's Date: 5/1/2025    Admit Date: 4/26/2025    Plan: Home   Discharge Plan       Row Name 05/01/25 0955       Plan    Plan Comments Awaiting therapy eval to determine WB status and DME needs. Will send orders to [facility] once completed.                   Discharge Codes    No documentation.                       VIKI Mcmullen

## 2025-05-01 NOTE — PLAN OF CARE
Goal Outcome Evaluation:  Plan of Care Reviewed With: patient, spouse        Progress: no change     Pt A/Ox4. VSS on RA. IV to RFA, IID. Up x1 with walker to BR. Voiding via urinal. Off-loading shoe to R foot due to toe amputation. NWB, pt noncompliant at times. BM this shift. C/o pain, PRN meds given. Dressing to coccyx CDI. Accucheck, coverage given. Tele. Eliquis. Family at . Safety maintained. Call light in reach.

## 2025-05-01 NOTE — THERAPY TREATMENT NOTE
Acute Care - Physical Therapy Treatment Note  [facility]     Patient Name: Carlos A Boyer Jr.  : 1960  MRN: [id number]  Today's Date: 2025      Visit Dx:     ICD-10-CM ICD-9-CM   1. Recurrent seizures  G40.909 345.80   2. Wound of sacral region, initial encounter  S31.000A 959.19   3. Cellulitis of right toe  L03.031 681.10   4. Gas gangrene of foot  A48.0 040.0   5. Retained orthopedic hardware  Z96.9 V45.89   6. Type 2 diabetes mellitus with other circulatory complication, with long-term current use of insulin  E11.59 250.70    Z79.4 V58.67   7. Impaired mobility [Z74.09]  Z74.09 799.89     Patient Active Problem List   Diagnosis    Type 2 diabetes mellitus with circulatory disorder, with long-term current use of insulin    Gastroesophageal reflux disease without esophagitis    Primary hypertension    Seizure disorder    Carotid disease, bilateral    Coronary artery disease involving native coronary artery of native heart without angina pectoris    Hyperlipidemia LDL goal <70    Chronic left arterial ischemic stroke, ICA (internal carotid artery)    HOA on CPAP    Benign non-nodular prostatic hyperplasia with lower urinary tract symptoms    Deviated nasal septum    Maribell bullosa    Hypertrophy of both inferior nasal turbinates    Chronic sinusitis of both maxillary sinuses    S/P FESS (functional endoscopic sinus surgery)    Diabetic complication    Epigastric pain    Diabetic peripheral neuropathy    Class 1 obesity due to excess calories with serious comorbidity and body mass index (BMI) of 34.0 to 34.9 in adult    Aspirin-like platelet function defect    History of seizure    Lung nodules    Cardiac murmur    Thoracic aortic aneurysm without rupture    Paroxysmal atrial fibrillation    Atrial flutter    Chronic diastolic congestive heart failure with preserved ejection fraction    Fluid overload    Bilateral pleural effusion    Respiratory distress    Long-term use of high-risk medication     Dysphagia    Left lower quadrant abdominal pain    COVID-19 virus detected    Shortness of breath    S/P CABG x 2    Chronic anticoagulation    Old complex tear of lateral meniscus of right knee    BPH with obstruction/lower urinary tract symptoms    Daytime somnolence    Snoring    Aneurysm of ascending aorta without rupture    Presence of CardioMEMS HF system    Chest pain    Non-pressure chronic ulcer of other part of right foot with fat layer exposed    Diabetic ulcer of toe of right foot associated with type 2 diabetes mellitus, with fat layer exposed    Deformity, toe acquired, right    Cellulitis of right toe    Chronic heart failure with preserved ejection fraction (HFpEF)    Recurrent seizures    Gas gangrene of foot    Retained orthopedic hardware     Past Medical History:   Diagnosis Date    Arthritis     Asthma     Cancer     skin    Carotid disease, bilateral     Cellulitis     right foot - on antibiotics 6-    Chest pain     Chronic diastolic congestive heart failure 09/07/2019    Chronic sinusitis     Maribell bullosa     Coronary artery disease involving native coronary artery of native heart with unstable angina pectoris 01/17/2017    Deviated septum     Diabetes mellitus     Difficulty urinating     Diverticulitis     Enlarged prostate     Fatty liver     GERD (gastroesophageal reflux disease)     [place] (hard of hearing)     Does have hearing aids    Hyperlipidemia LDL goal <70 02/02/2017    Hypertrophy of nasal turbinates     Keratoderma     Kidney stone     Lung nodules     lower right lung    Migraine     Murmur, heart     Myocardial infarction     Obesity     Paroxysmal atrial fibrillation 07/11/2019    Personal history of COVID-19 07/2021    PONV (postoperative nausea and vomiting)     Primary hypertension 10/16/2016    Psoriasis     Seizures     Sinus congestion     Skin cancer     Sleep apnea     not using cpap    SOB (shortness of breath)     Stroke     mild weakness on right side    UTI  (urinary tract infection)      Past Surgical History:   Procedure Laterality Date    AMPUTATION DIGIT Right 4/30/2025    Procedure: Partial vs Complete Amputation of 2nd Toe, Hardware Removal from Hallux - Right Foot;  Surgeon: Hernan Villa DPM;  Location:  [facility] OR;  Service: Podiatry;  Laterality: Right;    CARDIAC CATHETERIZATION  01/2016    Dr. Broadbent; widely patent previously placed stents in the left anterior descending and obstructive disease involving the diagonal branch which was treated medically    CARDIAC CATHETERIZATION N/A 07/14/2017    Procedure: Left Heart Cath;  Surgeon: Wade Ramey MD;  Location:  [facility] CATH INVASIVE LOCATION;  Service:     CARDIAC CATHETERIZATION Left 10/15/2018    Procedure: Cardiac Catheterization/Vascular Study;  Surgeon: Wade Ramey MD;  Location:  [facility] CATH INVASIVE [facility];  Service: Cardiology    CARDIAC CATHETERIZATION  10/15/2018    Procedure: Functional Flow [place];  Surgeon: Wade Ramey MD;  Location:  [facility] CATH INVAS[facility];  Service: Cardiology    CARDIAC CATHETERIZATION N/A 10/15/2018    Procedure: Left ventriculography;  Surgeon: Wade Ramey MD;  Location:  [facility] CATH [facility];  Service: Cardiology    CARDIAC CATHETERIZATION Left 06/26/2019    Procedure: Cardiac Catheterization/Vascular Study VEL OK  HE WILL WAIT 1 YEAR FOR SHOULDER SURGERY ;  Surgeon: Wade Ramey MD;  Location:  [facility] CATH [facility];  Service: Cardiology    CARDIAC CATHETERIZATION Left 04/30/2021    Procedure: Coronary angiography;  Surgeon: Sahil Llamas MD;  Location:  [facility] CATH INVASIVE [facility];  Service: Cardiology;  Laterality: Left;    CARDIAC CATHETERIZATION N/A 04/30/2021    Procedure: Percutaneous Coronary Intervention;  Surgeon: Sahil Llamas MD;  Location:  [facility] CATH [facility];  Service: Cardiology;  Laterality: N/A;    CARDIAC CATHETERIZATION N/A 11/09/2022    Procedure: Left Heart Cath with SVGs;  Surgeon: Wade Ramey MD;   Location: [facility] CATH INVASIVE LOCATION;  Service: Cardiology;  Laterality: N/A;    CARPAL TUNNEL RELEASE      January 2024 and pther hand February 2024    CHOLECYSTECTOMY      CHOLECYSTECTOMY WITH INTRAOPERATIVE CHOLANGIOGRAM N/A 08/01/2018    Procedure: CHOLECYSTECTOMY LAPAROSCOPIC INTRAOPERATIVE CHOLANGIOGRAM;  Surgeon: Shane Ann MD;  Location: [facility] OR;  Service: General    COLONOSCOPY N/A 07/14/2020    Procedure: COLONOSCOPY WITH ANESTHESIA;  Surgeon: Anupam Morales DO;  Location: [facility] ENDOSCOPY;  Service: Gastroenterology;  Laterality: N/A;  pre: abdominal pain  post: diverticulosis  Vinayak Correia PA    CORONARY ANGIOPLASTY      CORONARY ARTERY BYPASS GRAFT N/A 07/06/2019    Procedure: CABG X2 WITH LIMA, LEFT LEG OVH, AND PLACEMENT OF LEFT FEMORAL ARTERIAL LINE;  Surgeon: Steven Tang MD;  Location: [facility] OR;  Service: Cardiothoracic    CORONARY STENT PLACEMENT      x 6    CYSTOSCOPY TRANSURETHRAL RESECTION OF PROSTATE N/A 01/23/2023    Procedure: CYSTOSCOPY TRANSURETHRAL RESECTION OF PROSTATE;  Surgeon: Latrell Pope MD;  Location: [facility] OR;  Service: Urology;  Laterality: N/A;    ENDOSCOPIC FUNCTIONAL SINUS SURGERY (FESS) Bilateral 12/13/2017    Procedure: PROCEDURE PERFORMED:  Bilateral functional endoscopic anterior ethmoidectomy with bilateral middle meatal antrostomy Septoplasty Right kathia bullosa resection Bilateral inferior turbinate reduction via Coblation;  Surgeon: Mayank Ibarra MD;  Location: [facility] OR;  Service:     ENDOSCOPY N/A 07/30/2018    Procedure: ESOPHAGOGASTRODUODENOSCOPY WITH ANESTHESIA;  Surgeon: Benitez Mas MD;  Location: [facility] ENDOSCOPY;  Service: Gastroenterology    ENDOSCOPY N/A 07/14/2020    Procedure: ESOPHAGOGASTRODUODENOSCOPY WITH ANESTHESIA;  Surgeon: Anupam Morales DO;  Location: [facility] ENDOSCOPY;  Service: Gastroenterology;  Laterality: N/A;  pre: abdominal pain  post: esophagitis  Vinayak Correia PA    HARDWARE  "REMOVAL Right 4/30/2025    Procedure: Hardware Removal from Hallux - Right Foot;  Surgeon: Hernan Villa DPM;  Location:  [facility] OR;  Service: Podiatry;  Laterality: Right;    HERNIA REPAIR      x2 inguinal area    KIDNEY STONE SURGERY      KNEE ARTHROSCOPY Right 03/01/2022    Procedure: RIGHT KNEE PARTIAL LATERAL MENISCECTOMY;  Surgeon: Pedro Pablo Song MD;  Location:  [facility] OR;  Service: Orthopedics;  Laterality: Right;    KNEE SURGERY Right     OTHER SURGICAL HISTORY      urolift    PROSTATE SURGERY      Dr. Badillo - 2017    PULMONARY ARTERY PRESSURE SENSOR IMPLANT N/A 05/08/2023    Procedure: PA Pressure Sensor Implant;  Surgeon: Wade Ramey MD;  Location:  [facility] CATH INVASIVE LOCATION;  Service: Cardiology;  Laterality: N/A;    ROTATOR CUFF REPAIR Right     SLEEP ENDOSCOPY N/A 02/27/2023    Procedure: Videosleep endoscopy;  Surgeon: Mayank Ibarra MD;  Location:  [facility] OR;  Service: ENT;  Laterality: N/A;    THUMB AMPUTATION Left     partial    TOE FUSION Right 7/3/2024    Procedure: Hallux Interphalangeal Joint Arthrodesis, Bone Graft [place] - Right Foot;  Surgeon: Hernan Villa DPM;  Location:  [facility] OR;  Service: Podiatry;  Laterality: Right;    TOE SURGERY      great toe     PT Assessment (Last 12 Hours)       PT Evaluation and Treatment       Row Name 05/01/25 1438          Physical Therapy Time and Intention    Subjective Information complains of;pain  -NW     Document Type therapy note (daily note)  -NW     Mode of Treatment physical therapy  -NW     Comment cont reinforcement of NWB status. pt reports that its ok to use his heel. but cont to reinterate keeping foot off floor and \"hopping\"  -NW       Row Name 05/01/25 1438          General Information    Existing Precautions/Restrictions fall;non-weight bearing  NWB RLE  -NW       Row Name 05/01/25 1438          Pain    Posttreatment Pain Rating 6/10  -NW     Pain Location foot  -NW     Pain Side/Orientation right  -NW       Row " Name 05/01/25 1438          Bed Mobility    Supine-Sit [place] (Bed Mobility) independent  -NW     Sit-Supine [place] (Bed Mobility) --  sitting EOB  -NW       Row Name 05/01/25 1438          Sit-Stand Transfer    Sit-Stand [place] (Transfers) supervision  -NW       Row Name 05/01/25 1438          Stand-Sit Transfer    Stand-Sit [place] (Transfers) supervision  -NW       Row Name 05/01/25 1438          Gait/Stairs (Locomotion)    [place] Level (Gait) verbal cues;standby assist;contact guard  -NW     Assistive Device (Gait) walker, front-wheeled  -NW     Distance in Feet (Gait) 15  15x2  -NW     Maintains Weight-bearing Status (Gait) unable to maintain  -NW     Comment, (Gait/Stairs) constant cues for safety reinforced. discussed home safety and POC  -NW       Row Name 05/01/25 1438          Safety Issues/Impairments Affecting Functional Mobility    Impairments Affecting Function (Mobility) balance;endurance/activity tolerance  -NW       Row Name 05/01/25 1438          Motor Skills    Therapeutic Exercise aerobic  -NW       Row Name 05/01/25 1438          Aerobic Exercise    Time Performed (Aerobic Exercise) AROM BLES  -NW       Row Name             Wound posterior coccyx Traumatic Skin Tear    Wound - Properties Group Orientation: posterior  -JM Location: coccyx  -JM Primary Wound Type: Traumatic  -JM Secondary Wound Type - Traumatic: Skin Tear  -JM    Retired Wound - Properties Group Orientation: posterior  -JM Location: coccyx  -JM    Retired Wound - Properties Group Orientation: posterior  -JM Location: coccyx  -JM    Retired Wound - Properties Group Location: coccyx  -JM      Row Name             Wound Right posterior great toe Other (Comments)    Wound - Properties Group Side: Right  -JM Orientation: posterior  -JM Location: great toe  -JM Primary Wound Type: Other  -JM Secondary Wound Type - Other: --  -JM, callous with crack at bottom of right great toe     Retired Wound - Properties  Group Side: Right  -JM Orientation: posterior  -JM Location: great toe  -JM    Retired Wound - Properties Group Side: Right  -JM Orientation: posterior  -JM Location: great toe  -JM    Retired Wound - Properties Group Side: Right  -JM Location: great toe  -JM      Row Name             Wound 04/30/25 1425 Right anterior second toe Surgical Closed Surgical Incision    Wound - Properties Group Placement Date: 04/30/25  -HW Placement Time: 1425  -HW Side: Right  -HW Orientation: anterior  -HW Location: second toe  -HW Primary Wound Type: Surgical  -HW Secondary Wound Type - Surgical: Closed Surgi  -HW    Retired Wound - Properties Group Placement Date: 04/30/25  -HW Placement Time: 1425  -HW Side: Right  -HW Orientation: anterior  -HW Location: second toe  -HW    Retired Wound - Properties Group Placement Date: 04/30/25  -HW Placement Time: 1425  -HW Side: Right  -HW Orientation: anterior  -HW Location: second toe  -HW    Retired Wound - Properties Group Date first assessed: 04/30/25  -HW Time first assessed: 1425 -HW Side: Right  -HW Location: second toe  -HW      Row Name 05/01/25 1438          Positioning and Restraints    Pre-Treatment Position in bed  -NW     Post Treatment Position bed  -NW     In Bed sitting EOB;call light within reach;encouraged to call for assist  -NW               User Key  (r) = Recorded By, (t) = Taken By, (c) = Cosigned By      Initials Name Provider Type    NW Cayla Joshi PTA Physical Therapist Assistant     Eric Bonilla, RN Registered Nurse    Mira Perez, RN Registered Nurse                    Physical Therapy Education       Title: PT OT SLP Therapies (In Progress)       Topic: Physical Therapy (In Progress)       Point: Mobility training (In Progress)       Learning Progress Summary            Patient Acceptance, E, NR by MS at 5/1/2025 1142    Comment: role of PT in his care, NWB status                      Point: Home exercise program (Not Started)       Learner  Progress:  Not documented in this visit.              Point: Body mechanics (Not Started)       Learner Progress:  Not documented in this visit.              Point: Precautions (In Progress)       Learning Progress Summary            Patient Acceptance, E, NR by MS at 5/1/2025 1142    Comment: role of PT in his care, NWB status                                      User Key       Initials Effective Dates Name Provider Type Discipline    MS 07/11/23 -  Nidia Fulton, PT, DPT, NCS Physical Therapist PT                  PT Recommendation and Plan             Time Calculation:    PT Charges       Row Name 05/01/25 1030             Time Calculation    Start Time 1030  -MS      Stop Time 1100  -MS      Time Calculation (min) 30 min  -MS      PT Received On 05/01/25  -MS      PT Goal Re-Cert Due Date 05/11/25  -MS         Untimed Charges    PT Eval/Re-eval Minutes 30  -MS         Total Minutes    Untimed Charges Total Minutes 30  -MS       Total Minutes 30  -MS                User Key  (r) = Recorded By, (t) = Taken By, (c) = Cosigned By      Initials Name Provider Type    MS Nidia Fulton, PT, DPT, NCS Physical Therapist                      PT G-Codes  Outcome Measure Options: AM-PAC 6 Clicks Basic Mobility (PT)  AM-PAC 6 Clicks Score (PT): 20  AM-PAC 6 Clicks Score (OT): 24    Cayla Joshi, PTA  5/1/2025

## 2025-05-02 ENCOUNTER — READMISSION MANAGEMENT (OUTPATIENT)
Dept: CALL CENTER | Facility: HOSPITAL | Age: 65
End: 2025-05-02
Payer: OTHER GOVERNMENT

## 2025-05-02 VITALS
HEIGHT: 72 IN | DIASTOLIC BLOOD PRESSURE: 65 MMHG | WEIGHT: 239.1 LBS | TEMPERATURE: 97.8 F | BODY MASS INDEX: 32.38 KG/M2 | SYSTOLIC BLOOD PRESSURE: 110 MMHG | HEART RATE: 58 BPM | OXYGEN SATURATION: 94 % | RESPIRATION RATE: 16 BRPM

## 2025-05-02 DIAGNOSIS — Z89.429 STATUS POST AMPUTATION OF LESSER TOE, UNSPECIFIED LATERALITY: Primary | ICD-10-CM

## 2025-05-02 LAB
ALPHA-GAL IGE QN: 0.19 KU/L
ANION GAP SERPL CALCULATED.3IONS-SCNC: 11 MMOL/L (ref 5–15)
BACTERIA SPEC AEROBE CULT: NORMAL
BACTERIA SPEC AEROBE CULT: NORMAL
BEEF IGE QN: <0.1 KU/L
BUN SERPL-MCNC: 19 MG/DL (ref 8–23)
BUN/CREAT SERPL: 21.3 (ref 7–25)
CALCIUM SPEC-SCNC: 8.7 MG/DL (ref 8.6–10.5)
CHLORIDE SERPL-SCNC: 104 MMOL/L (ref 98–107)
CO2 SERPL-SCNC: 21 MMOL/L (ref 22–29)
CONV CLASS DESCRIPTION: ABNORMAL
CREAT SERPL-MCNC: 0.89 MG/DL (ref 0.76–1.27)
DEPRECATED RDW RBC AUTO: 44.6 FL (ref 37–54)
EGFRCR SERPLBLD CKD-EPI 2021: 93.9 ML/MIN/1.73
ERYTHROCYTE [DISTWIDTH] IN BLOOD BY AUTOMATED COUNT: 13.2 % (ref 12.3–15.4)
GLUCOSE BLDC GLUCOMTR-MCNC: 177 MG/DL (ref 70–130)
GLUCOSE BLDC GLUCOMTR-MCNC: 190 MG/DL (ref 70–130)
GLUCOSE BLDC GLUCOMTR-MCNC: 227 MG/DL (ref 70–130)
GLUCOSE SERPL-MCNC: 218 MG/DL (ref 65–99)
HCT VFR BLD AUTO: 40.7 % (ref 37.5–51)
HGB BLD-MCNC: 13 G/DL (ref 13–17.7)
IGE SERPL-ACNC: 84 IU/ML (ref 6–495)
LAB AP CASE REPORT: NORMAL
LAMB IGE QN: <0.1 KU/L
Lab: NORMAL
MCH RBC QN AUTO: 29.7 PG (ref 26.6–33)
MCHC RBC AUTO-ENTMCNC: 31.9 G/DL (ref 31.5–35.7)
MCV RBC AUTO: 93.1 FL (ref 79–97)
PATH REPORT.FINAL DX SPEC: NORMAL
PATH REPORT.GROSS SPEC: NORMAL
PLATELET # BLD AUTO: 104 10*3/MM3 (ref 140–450)
PMV BLD AUTO: 11.3 FL (ref 6–12)
PORK IGE QN: <0.1 KU/L
POTASSIUM SERPL-SCNC: 4.2 MMOL/L (ref 3.5–5.2)
RBC # BLD AUTO: 4.37 10*6/MM3 (ref 4.14–5.8)
SODIUM SERPL-SCNC: 136 MMOL/L (ref 136–145)
WBC NRBC COR # BLD AUTO: 5.12 10*3/MM3 (ref 3.4–10.8)

## 2025-05-02 PROCEDURE — 80048 BASIC METABOLIC PNL TOTAL CA: CPT | Performed by: PODIATRIST

## 2025-05-02 PROCEDURE — 82948 REAGENT STRIP/BLOOD GLUCOSE: CPT

## 2025-05-02 PROCEDURE — 97116 GAIT TRAINING THERAPY: CPT

## 2025-05-02 PROCEDURE — 63710000001 INSULIN LISPRO (HUMAN) PER 5 UNITS: Performed by: PODIATRIST

## 2025-05-02 PROCEDURE — 63710000001 INSULIN GLARGINE PER 5 UNITS: Performed by: PODIATRIST

## 2025-05-02 PROCEDURE — 99024 POSTOP FOLLOW-UP VISIT: CPT | Performed by: NURSE PRACTITIONER

## 2025-05-02 PROCEDURE — 85027 COMPLETE CBC AUTOMATED: CPT | Performed by: PODIATRIST

## 2025-05-02 RX ADMIN — Medication 10 ML: at 08:22

## 2025-05-02 RX ADMIN — ASPIRIN 81 MG: 81 TABLET, COATED ORAL at 08:20

## 2025-05-02 RX ADMIN — EMPAGLIFLOZIN 25 MG: 10 TABLET, FILM COATED ORAL at 08:21

## 2025-05-02 RX ADMIN — METOPROLOL TARTRATE 100 MG: 100 TABLET, FILM COATED ORAL at 08:24

## 2025-05-02 RX ADMIN — Medication 1 TABLET: at 08:20

## 2025-05-02 RX ADMIN — ISOSORBIDE MONONITRATE 120 MG: 30 TABLET, EXTENDED RELEASE ORAL at 08:59

## 2025-05-02 RX ADMIN — INSULIN LISPRO 5 UNITS: 100 INJECTION, SOLUTION INTRAVENOUS; SUBCUTANEOUS at 11:44

## 2025-05-02 RX ADMIN — INSULIN LISPRO 3 UNITS: 100 INJECTION, SOLUTION INTRAVENOUS; SUBCUTANEOUS at 17:56

## 2025-05-02 RX ADMIN — PANTOPRAZOLE SODIUM 40 MG: 40 TABLET, DELAYED RELEASE ORAL at 08:20

## 2025-05-02 RX ADMIN — FLUTICASONE PROPIONATE 2 SPRAY: 50 SPRAY, METERED NASAL at 08:21

## 2025-05-02 RX ADMIN — TAMSULOSIN HYDROCHLORIDE 0.4 MG: 0.4 CAPSULE ORAL at 08:21

## 2025-05-02 RX ADMIN — INSULIN LISPRO 3 UNITS: 100 INJECTION, SOLUTION INTRAVENOUS; SUBCUTANEOUS at 08:19

## 2025-05-02 RX ADMIN — INSULIN GLARGINE 25 UNITS: 100 INJECTION, SOLUTION SUBCUTANEOUS at 08:19

## 2025-05-02 RX ADMIN — LEVETIRACETAM 1500 MG: 500 TABLET, FILM COATED ORAL at 08:20

## 2025-05-02 RX ADMIN — SACUBITRIL AND VALSARTAN 1 TABLET: 97; 103 TABLET, FILM COATED ORAL at 08:25

## 2025-05-02 RX ADMIN — LAMOTRIGINE 200 MG: 100 TABLET ORAL at 08:20

## 2025-05-02 RX ADMIN — APIXABAN 5 MG: 5 TABLET, FILM COATED ORAL at 08:20

## 2025-05-02 NOTE — THERAPY TREATMENT NOTE
Acute Care - Physical Therapy Treatment Note  [facility]     Patient Name: Carlos A Boyer Jr.  : 1960  MRN: [id number]  Today's Date: 2025      Visit Dx:     ICD-10-CM ICD-9-CM   1. Recurrent seizures  G40.909 345.80   2. Wound of sacral region, initial encounter  S31.000A 959.19   3. Cellulitis of right toe  L03.031 681.10   4. Gas gangrene of foot  A48.0 040.0   5. Retained orthopedic hardware  Z96.9 V45.89   6. Type 2 diabetes mellitus with other circulatory complication, with long-term current use of insulin  E11.59 250.70    Z79.4 V58.67   7. Impaired mobility [Z74.09]  Z74.09 799.89     Patient Active Problem List   Diagnosis    Type 2 diabetes mellitus with circulatory disorder, with long-term current use of insulin    Gastroesophageal reflux disease without esophagitis    Primary hypertension    Seizure disorder    Carotid disease, bilateral    Coronary artery disease involving native coronary artery of native heart without angina pectoris    Hyperlipidemia LDL goal <70    Chronic left arterial ischemic stroke, ICA (internal carotid artery)    HOA on CPAP    Benign non-nodular prostatic hyperplasia with lower urinary tract symptoms    Deviated nasal septum    Maribell bullosa    Hypertrophy of both inferior nasal turbinates    Chronic sinusitis of both maxillary sinuses    S/P FESS (functional endoscopic sinus surgery)    Diabetic complication    Epigastric pain    Diabetic peripheral neuropathy    Class 1 obesity due to excess calories with serious comorbidity and body mass index (BMI) of 34.0 to 34.9 in adult    Aspirin-like platelet function defect    History of seizure    Lung nodules    Cardiac murmur    Thoracic aortic aneurysm without rupture    Paroxysmal atrial fibrillation    Atrial flutter    Chronic diastolic congestive heart failure with preserved ejection fraction    Fluid overload    Bilateral pleural effusion    Respiratory distress    Long-term use of high-risk medication     Dysphagia    Left lower quadrant abdominal pain    COVID-19 virus detected    Shortness of breath    S/P CABG x 2    Chronic anticoagulation    Old complex tear of lateral meniscus of right knee    BPH with obstruction/lower urinary tract symptoms    Daytime somnolence    Snoring    Aneurysm of ascending aorta without rupture    Presence of CardioMEMS HF system    Chest pain    Non-pressure chronic ulcer of other part of right foot with fat layer exposed    Diabetic ulcer of toe of right foot associated with type 2 diabetes mellitus, with fat layer exposed    Deformity, toe acquired, right    Cellulitis of right toe    Chronic heart failure with preserved ejection fraction (HFpEF)    Recurrent seizures    Gas gangrene of foot    Retained orthopedic hardware     Past Medical History:   Diagnosis Date    Arthritis     Asthma     Cancer     skin    Carotid disease, bilateral     Cellulitis     right foot - on antibiotics 6-    Chest pain     Chronic diastolic congestive heart failure 09/07/2019    Chronic sinusitis     Maribell bullosa     Coronary artery disease involving native coronary artery of native heart with unstable angina pectoris 01/17/2017    Deviated septum     Diabetes mellitus     Difficulty urinating     Diverticulitis     Enlarged prostate     Fatty liver     GERD (gastroesophageal reflux disease)     [place] (hard of hearing)     Does have hearing aids    Hyperlipidemia LDL goal <70 02/02/2017    Hypertrophy of nasal turbinates     Keratoderma     Kidney stone     Lung nodules     lower right lung    Migraine     Murmur, heart     Myocardial infarction     Obesity     Paroxysmal atrial fibrillation 07/11/2019    Personal history of COVID-19 07/2021    PONV (postoperative nausea and vomiting)     Primary hypertension 10/16/2016    Psoriasis     Seizures     Sinus congestion     Skin cancer     Sleep apnea     not using cpap    SOB (shortness of breath)     Stroke     mild weakness on right side    UTI  (urinary tract infection)      Past Surgical History:   Procedure Laterality Date    AMPUTATION DIGIT Right 4/30/2025    Procedure: Partial vs Complete Amputation of 2nd Toe, Hardware Removal from Hallux - Right Foot;  Surgeon: Hernan Villa DPM;  Location:  [facility] OR;  Service: Podiatry;  Laterality: Right;    CARDIAC CATHETERIZATION  01/2016    Dr. Broadbent; widely patent previously placed stents in the left anterior descending and obstructive disease involving the diagonal branch which was treated medically    CARDIAC CATHETERIZATION N/A 07/14/2017    Procedure: Left Heart Cath;  Surgeon: Wade Ramey MD;  Location:  [facility] CATH INVASIVE LOCATION;  Service:     CARDIAC CATHETERIZATION Left 10/15/2018    Procedure: Cardiac Catheterization/Vascular Study;  Surgeon: Wade Ramey MD;  Location:  [facility] CATH INVASIVE [facility];  Service: Cardiology    CARDIAC CATHETERIZATION  10/15/2018    Procedure: Functional Flow [place];  Surgeon: Wade Ramey MD;  Location:  [facility] CATH INVASIVE [facility];  Service: Cardiology    CARDIAC CATHETERIZATION N/A 10/15/2018    Procedure: Left ventriculography;  Surgeon: Wade Ramey MD;  Location:  [facility] CATH [facility]IVE [facility];  Service: Cardiology    CARDIAC CATHETERIZATION Left 06/26/2019    Procedure: Cardiac Catheterization/Vascular Study VEL OK  HE WILL WAIT 1 YEAR FOR SHOULDER SURGERY ;  Surgeon: Wade Ramey MD;  Location:  [facility];  Service: Cardiology    CARDIAC CATHETERIZATION Left 04/30/2021    Procedure: Coronary angiography;  Surgeon: Sahil Llamas MD;  Location:  [facility] CATH [facility];  Service: Cardiology;  Laterality: Left;    CARDIAC CATHETERIZATION N/A 04/30/2021    Procedure: Percutaneous Coronary Intervention;  Surgeon: Sahil Llamas MD;  Location:  [facility] CATH [facility];  Service: Cardiology;  Laterality: N/A;    CARDIAC CATHETERIZATION N/A 11/09/2022    Procedure: Left Heart Cath with SVGs;  Surgeon: Wade Ramey MD;   Location: [facility] CATH INVASIVE LOCATION;  Service: Cardiology;  Laterality: N/A;    CARPAL TUNNEL RELEASE      January 2024 and pther hand February 2024    CHOLECYSTECTOMY      CHOLECYSTECTOMY WITH INTRAOPERATIVE CHOLANGIOGRAM N/A 08/01/2018    Procedure: CHOLECYSTECTOMY LAPAROSCOPIC INTRAOPERATIVE CHOLANGIOGRAM;  Surgeon: Shane Ann MD;  Location: [facility] OR;  Service: General    COLONOSCOPY N/A 07/14/2020    Procedure: COLONOSCOPY WITH ANESTHESIA;  Surgeon: Anupam Morales DO;  Location: [facility] ENDOSCOPY;  Service: Gastroenterology;  Laterality: N/A;  pre: abdominal pain  post: diverticulosis  Vinayak Correia PA    CORONARY ANGIOPLASTY      CORONARY ARTERY BYPASS GRAFT N/A 07/06/2019    Procedure: CABG X2 WITH LIMA, LEFT LEG OVH, AND PLACEMENT OF LEFT FEMORAL ARTERIAL LINE;  Surgeon: Steven Tang MD;  Location: [facility] OR;  Service: Cardiothoracic    CORONARY STENT PLACEMENT      x 6    CYSTOSCOPY TRANSURETHRAL RESECTION OF PROSTATE N/A 01/23/2023    Procedure: CYSTOSCOPY TRANSURETHRAL RESECTION OF PROSTATE;  Surgeon: Latrell Pope MD;  Location: [facility] OR;  Service: Urology;  Laterality: N/A;    ENDOSCOPIC FUNCTIONAL SINUS SURGERY (FESS) Bilateral 12/13/2017    Procedure: PROCEDURE PERFORMED:  Bilateral functional endoscopic anterior ethmoidectomy with bilateral middle meatal antrostomy Septoplasty Right kathia bullosa resection Bilateral inferior turbinate reduction via Coblation;  Surgeon: Mayank Ibarra MD;  Location: [facility] OR;  Service:     ENDOSCOPY N/A 07/30/2018    Procedure: ESOPHAGOGASTRODUODENOSCOPY WITH ANESTHESIA;  Surgeon: Benitez Mas MD;  Location: [facility] ENDOSCOPY;  Service: Gastroenterology    ENDOSCOPY N/A 07/14/2020    Procedure: ESOPHAGOGASTRODUODENOSCOPY WITH ANESTHESIA;  Surgeon: Anupam Morales DO;  Location: [facility] ENDOSCOPY;  Service: Gastroenterology;  Laterality: N/A;  pre: abdominal pain  post: esophagitis  Vinayak Correia PA    HARDWARE  REMOVAL Right 4/30/2025    Procedure: Hardware Removal from Hallux - Right Foot;  Surgeon: Hernan Villa DPM;  Location:  [facility] OR;  Service: Podiatry;  Laterality: Right;    HERNIA REPAIR      x2 inguinal area    KIDNEY STONE SURGERY      KNEE ARTHROSCOPY Right 03/01/2022    Procedure: RIGHT KNEE PARTIAL LATERAL MENISCECTOMY;  Surgeon: Pedro Pablo Song MD;  Location:  [facility] OR;  Service: Orthopedics;  Laterality: Right;    KNEE SURGERY Right     OTHER SURGICAL HISTORY      urolift    PROSTATE SURGERY      Dr. Badillo - 2017    PULMONARY ARTERY PRESSURE SENSOR IMPLANT N/A 05/08/2023    Procedure: PA Pressure Sensor Implant;  Surgeon: Wade Ramey MD;  Location:  [facility] CATH INVASIVE LOC[facility];  Service: Cardiology;  Laterality: N/A;    ROTATOR CUFF REPAIR Right     SLEEP ENDOSCOPY N/A 02/27/2023    Procedure: Videosleep endoscopy;  Surgeon: Mayank Ibarra MD;  Location:  [facility] OR;  Service: ENT;  Laterality: N/A;    THUMB AMPUTATION Left     partial    TOE FUSION Right 7/3/2024    Procedure: Hallux Interphalangeal Joint Arthrodesis, Bone Graft [place] - Right Foot;  Surgeon: Hernan Villa DPM;  Location:  [facility] OR;  Service: Podiatry;  Laterality: Right;    TOE SURGERY      great toe     PT Assessment (Last 12 Hours)       PT Evaluation and Treatment       Row Name 05/02/25 1050 05/02/25 1041       Physical Therapy Time and Intention    Subjective Information complains of;pain  -NW --  -NW    Document Type therapy note (daily note)  -NW --    Mode of Treatment physical therapy  -NW --      Row Name 05/02/25 1050          General Information    Existing Precautions/Restrictions fall;non-weight bearing   pt has surgical shoe and would benfit from offloading shoe. constant cues to maintain NWB status, pt tends to put weight down through heel.  -NW       Row Name 05/02/25 1050          Pain    Posttreatment Pain Rating 5/10  -NW     Pain Location foot  -NW     Pain Side/Orientation right  -NW        "Row Name 05/02/25 1050          Bed Mobility    Comment, (Bed Mobility) chair  -NW       Row Name 05/02/25 1050          Sit-Stand Transfer    Sit-Stand [place] (Transfers) verbal cues;supervision  -NW       Row Name 05/02/25 1050          Stand-Sit Transfer    Stand-Sit [place] (Transfers) verbal cues;supervision  -NW       Row Name 05/02/25 1050          Gait/Stairs (Locomotion)    [place] Level (Gait) verbal cues;standby assist;contact guard  -NW     Assistive Device (Gait) walker, front-wheeled  -NW     Distance in Feet (Gait) 15  amb short distance in room due to pt not maintaining NWB  -NW     Harper Level (Stairs) contact guard;stand by assist  -NW     Handrail Location (Stairs) both sides  -NW     Number of Steps (Stairs) 5  -NW     Ascending Technique (Stairs) step-to-step  -NW     Descending Technique (Stairs) step-to-step  -NW     Comment, (Gait/Stairs) cues to maintain NWB pt was able to go up/down stairs but not by \"hopping\" he place weight through heel  -NW       Row Name 05/02/25 1050          Safety Issues/Impairments Affecting Functional Mobility    Impairments Affecting Function (Mobility) balance;endurance/activity tolerance;pain;strength  -NW       Row Name             Wound posterior coccyx Traumatic Skin Tear    Wound - Properties Group Orientation: posterior  -JM Location: coccyx  -JM Primary Wound Type: Traumatic  -JM Secondary Wound Type - Traumatic: Skin Tear  -JM    Retired Wound - Properties Group Orientation: posterior  -JM Location: coccyx  -JM    Retired Wound - Properties Group Orientation: posterior  -JM Location: coccyx  -JM    Retired Wound - Properties Group Location: coccyx  -JM      Row Name             Wound Right posterior great toe Other (Comments)    Wound - Properties Group Side: Right  -JM Orientation: posterior  -JM Location: great toe  -JM Primary Wound Type: Other  -JM Secondary Wound Type - Other: --  -JM, callous with crack at bottom of right " great toe     Retired Wound - Properties Group Side: Right  -JM Orientation: posterior  -JM Location: great toe  -JM    Retired Wound - Properties Group Side: Right  -JM Orientation: posterior  -JM Location: great toe  -JM    Retired Wound - Properties Group Side: Right  -JM Location: great toe  -JM      Row Name             Wound 04/30/25 1425 Right anterior second toe Surgical Closed Surgical Incision    Wound - Properties Group Placement Date: 04/30/25  -HW Placement Time: 1425  -HW Side: Right  -HW Orientation: anterior  -HW Location: second toe  -HW Primary Wound Type: Surgical  -HW Secondary Wound Type - Surgical: Closed Surgi  -HW    Retired Wound - Properties Group Placement Date: 04/30/25  -HW Placement Time: 1425  -HW Side: Right  -HW Orientation: anterior  -HW Location: second toe  -HW    Retired Wound - Properties Group Placement Date: 04/30/25  -HW Placement Time: 1425  -HW Side: Right  -HW Orientation: anterior  -HW Location: second toe  -HW    Retired Wound - Properties Group Date first assessed: 04/30/25  -HW Time first assessed: 1425  -HW Side: Right  -HW Location: second toe  -HW      Row Name 05/02/25 1050          Positioning and Restraints    Pre-Treatment Position sitting in chair/recliner  -NW     Post Treatment Position chair  -NW     In Chair reclined;call light within reach;encouraged to call for assist  -NW               User Key  (r) = Recorded By, (t) = Taken By, (c) = Cosigned By      Initials Name Provider Type    NW Cayla Joshi PTA Physical Therapist Assistant    Eric Treviño, RN Registered Nurse    Mira Perez RN Registered Nurse                    Physical Therapy Education       Title: PT OT SLP Therapies (In Progress)       Topic: Physical Therapy (In Progress)       Point: Mobility training (In Progress)       Learning Progress Summary            Patient Acceptance, E, NR by MS at 5/1/2025 1142    Comment: role of PT in his care, NWB status                       Point: Home exercise program (Not Started)       Learner Progress:  Not documented in this visit.              Point: Body mechanics (Not Started)       Learner Progress:  Not documented in this visit.              Point: Precautions (In Progress)       Learning Progress Summary            Patient Acceptance, E, NR by [place] at 5/1/2025 1142    Comment: role of PT in his care, NWB status                                      User Key       Initials Effective Dates Name Provider Type Discipline    MS 07/11/23 -  Nidia Fulton, PT, DPT, NCS Physical Therapist PT                  PT Recommendation and Plan             Time Calculation:     Therapy Charges for Today       Code Description Service Date Service Provider Modifiers Qty    90585207149 HC PT THER PROC EA 15 MIN 5/1/2025 Cayla Joshi, PTA GP 1    01866854668 HC GAIT TRAINING EA 15 MIN 5/1/2025 Cayla Joshi, PTA GP 1            PT G-Codes  Outcome Measure Options: AM-PAC 6 Clicks Basic Mobility (PT)  AM-PAC 6 Clicks Score (PT): 20  AM-PAC 6 Clicks Score (OT): 24    Cayla Joshi PTA  5/2/2025

## 2025-05-02 NOTE — PLAN OF CARE
Goal Outcome Evaluation:  Plan of Care Reviewed With: patient, spouse        Progress: improving  Outcome Evaluation: AOx4. VSS on RA. Up x1 with walker, SONAL RLE. Accuchecks AC/HS. Insulin coverage given as ordered. PRN pain medication available upon request. Patient to discharge home today. Safety maintained. Call light within reach.

## 2025-05-02 NOTE — PLAN OF CARE
Goal Outcome Evaluation:  Plan of Care Reviewed With: patient        Progress: improving  Outcome Evaluation: Pt up in chair. Minimal pain. Pt able to amb short distances needing cues to maintain NWB status therefore didn't amb farther. discussed this and safety awareness. pt reports that it was ok for him to put weidht through his heel to balance? Pt has a surgical shoe. but would benefit from offloading shoe to better help him to maintain WB status. Pt was able to go up/down 5 steps w/ cues and sba/cga. still placing weight through heel for balalance. Pt hopes to d/c home soon

## 2025-05-02 NOTE — CASE MANAGEMENT/SOCIAL WORK
Continued Stay Note  [facility]     Patient Name: Carlos A Boyer Jr.  MRN: [id number]  Today's Date: 5/2/2025    Admit Date: 4/26/2025    Plan: Home   Discharge Plan       Row Name 05/02/25 1405       Plan    Plan Comments Pt is doing well with therapy and plans to return home at [place]. Noted therapy did not recommend knee scooter due to safety. Will continue to follow.                   Discharge Codes    No documentation.                       VIKI Mcmullen

## 2025-05-02 NOTE — PROGRESS NOTES
[facility] - PODIATRY    Today's Date: 05/02/25     Patient Name: Carlos A Boyer Jr.  MRN: [id number]  [facility]: [id number]  PCP: Vinayak Correia PA  Referring Provider: Vish Latham MD  Attending Provider: Angela Cesar MD  Length of Stay: 6    SUBJECTIVE   Chief Complaint: Postop right second toe partial amputation and right hallux hardware removal     HPI: Carlos A Boyer Jr., a 65 y.o.male.  Patient is 2 days postop second toe partial amputation and right hallux hardware removal.  Patient reports he has had pain off and on throughout the night.  Patient is currently wearing postop shoe and his dressing is clean dry and intact.   Patient has been up walking with weight to his heel.    Past Medical History:   Diagnosis Date    Arthritis     Asthma     Cancer     skin    Carotid disease, bilateral     Cellulitis     right foot - on antibiotics 6-    Chest pain     Chronic diastolic congestive heart failure 09/07/2019    Chronic sinusitis     Maribell bullosa     Coronary artery disease involving native coronary artery of native heart with unstable angina pectoris 01/17/2017    Deviated septum     Diabetes mellitus     Difficulty urinating     Diverticulitis     Enlarged prostate     Fatty liver     GERD (gastroesophageal reflux disease)     [place] (hard of hearing)     Does have hearing aids    Hyperlipidemia LDL goal <70 02/02/2017    Hypertrophy of nasal turbinates     Keratoderma     Kidney stone     Lung nodules     lower right lung    Migraine     Murmur, heart     Myocardial infarction     Obesity     Paroxysmal atrial fibrillation 07/11/2019    Personal history of COVID-19 07/2021    PONV (postoperative nausea and vomiting)     Primary hypertension 10/16/2016    Psoriasis     Seizures     Sinus congestion     Skin cancer     Sleep apnea     not using cpap    SOB (shortness of breath)     Stroke     mild weakness on right side    UTI (urinary tract infection)      Past Surgical  History:   Procedure Laterality Date    AMPUTATION DIGIT Right 4/30/2025    Procedure: Partial vs Complete Amputation of 2nd Toe, Hardware Removal from Hallux - Right Foot;  Surgeon: Hernan Villa DPM;  Location:  [facility] OR;  Service: Podiatry;  Laterality: Right;    CARDIAC CATHETERIZATION  01/2016    Dr. Broadbent; widely patent previously placed stents in the left anterior descending and obstructive disease involving the diagonal branch which was treated medically    CARDIAC CATHETERIZATION N/A 07/14/2017    Procedure: Left Heart Cath;  Surgeon: Wade Ramey MD;  Location:  [facility] CATH INVASIVE [facility];  Service:     CARDIAC CATHETERIZATION Left 10/15/2018    Procedure: Cardiac Catheterization/Vascular Study;  Surgeon: Wade Ramey MD;  Location:  [facility];  Service: Cardiology    CARDIAC CATHETERIZATION  10/15/2018    Procedure: Functional Flow [place];  Surgeon: Wade Ramey MD;  Location:  [facility] CATH [facility];  Service: Cardiology    CARDIAC CATHETERIZATION N/A 10/15/2018    Procedure: Left ventriculography;  Surgeon: Wade Ramey MD;  Location:  [facility] CATH [facility];  Service: Cardiology    CARDIAC CATHETERIZATION Left 06/26/2019    Procedure: Cardiac Catheterization/Vascular Study VEL OK  HE WILL WAIT 1 YEAR FOR SHOULDER SURGERY ;  Surgeon: Wade Ramey MD;  Location:  [facility];  Service: Cardiology    CARDIAC CATHETERIZATION Left 04/30/2021    Procedure: Coronary angiography;  Surgeon: Sahil Llamas MD;  Location:  [facility];  Service: Cardiology;  Laterality: Left;    CARDIAC CATHETERIZATION N/A 04/30/2021    Procedure: Percutaneous Coronary Intervention;  Surgeon: Sahil Llamas MD;  Location:  [facility];  Service: Cardiology;  Laterality: N/A;    CARDIAC CATHETERIZATION N/A 11/09/2022    Procedure: Left Heart Cath with SVGs;  Surgeon: Wade Ramey MD;  Location:  [facility];   Service: Cardiology;  Laterality: N/A;    CARPAL TUNNEL RELEASE      January 2024 and pther hand February 2024    CHOLECYSTECTOMY      CHOLECYSTECTOMY WITH INTRAOPERATIVE CHOLANGIOGRAM N/A 08/01/2018    Procedure: CHOLECYSTECTOMY LAPAROSCOPIC INTRAOPERATIVE CHOLANGIOGRAM;  Surgeon: Shane Ann MD;  Location: [facility] OR;  Service: General    COLONOSCOPY N/A 07/14/2020    Procedure: COLONOSCOPY WITH ANESTHESIA;  Surgeon: Anupam Morales DO;  Location: [facility] ENDOSCOPY;  Service: Gastroenterology;  Laterality: N/A;  pre: abdominal pain  post: diverticulosis  Vinayak Correia PA    CORONARY ANGIOPLASTY      CORONARY ARTERY BYPASS GRAFT N/A 07/06/2019    Procedure: CABG X2 WITH LIMA, LEFT LEG OVH, AND PLACEMENT OF LEFT FEMORAL ARTERIAL LINE;  Surgeon: Steven Tang MD;  Location: [facility] OR;  Service: Cardiothoracic    CORONARY STENT PLACEMENT      x 6    CYSTOSCOPY TRANSURETHRAL RESECTION OF PROSTATE N/A 01/23/2023    Procedure: CYSTOSCOPY TRANSURETHRAL RESECTION OF PROSTATE;  Surgeon: Latrell Pope MD;  Location: [facility] OR;  Service: Urology;  Laterality: N/A;    ENDOSCOPIC FUNCTIONAL SINUS SURGERY (FESS) Bilateral 12/13/2017    Procedure: PROCEDURE PERFORMED:  Bilateral functional endoscopic anterior ethmoidectomy with bilateral middle meatal antrostomy Septoplasty Right kathia bullosa resection Bilateral inferior turbinate reduction via [place];  Surgeon: Mayank Ibarra MD;  Location: [facility] OR;  Service:     ENDOSCOPY N/A 07/30/2018    Procedure: ESOPHAGOGASTRODUODENOSCOPY WITH ANESTHESIA;  Surgeon: Benitez Mas MD;  Location: [facility] ENDOSCOPY;  Service: Gastroenterology    ENDOSCOPY N/A 07/14/2020    Procedure: ESOPHAGOGASTRODUODENOSCOPY WITH ANESTHESIA;  Surgeon: Anupam Morales DO;  Location: [facility] ENDOSCOPY;  Service: Gastroenterology;  Laterality: N/A;  pre: abdominal pain  post: esophagitis  Vinayak Correia PA    HARDWARE REMOVAL Right 4/30/2025    Procedure: Hardware  Removal from Hallux - Right Foot;  Surgeon: Hernan Villa DPM;  Location:  [facility] OR;  Service: Podiatry;  Laterality: Right;    HERNIA REPAIR      x2 inguinal area    KIDNEY STONE SURGERY      KNEE ARTHROSCOPY Right 2022    Procedure: RIGHT KNEE PARTIAL LATERAL MENISCECTOMY;  Surgeon: Pedro Pablo Song MD;  Location:  [facility] OR;  Service: Orthopedics;  Laterality: Right;    KNEE SURGERY Right     OTHER SURGICAL HISTORY      urolift    PROSTATE SURGERY      Dr. Badillo -     PULMONARY ARTERY PRESSURE SENSOR IMPLANT N/A 2023    Procedure: PA Pressure Sensor Implant;  Surgeon: Wade Ramey MD;  Location:  [facility] CATH [facility]IVE [facility];  Service: Cardiology;  Laterality: N/A;    ROTATOR CUFF REPAIR Right     SLEEP ENDOSCOPY N/A 2023    Procedure: Videosleep endoscopy;  Surgeon: Mayank Ibarra MD;  Location:  [facility] OR;  Service: ENT;  Laterality: N/A;    THUMB AMPUTATION Left     partial    TOE FUSION Right 7/3/2024    Procedure: Hallux Interphalangeal Joint Arthrodesis, Bone Graft [place] - Right Foot;  Surgeon: Hernan Villa DPM;  Location:  [facility] OR;  Service: Podiatry;  Laterality: Right;    TOE SURGERY      great toe     Family History   Problem Relation Age of Onset    Heart disease Father     COPD Mother     Hypertension Mother     Asthma Mother     No Known Problems Sister     Colon cancer Paternal Uncle     Prostate cancer Maternal Grandfather     No Known Problems Sister     Colon cancer Maternal Grandmother     Colon polyps Maternal Grandmother      Social History     Socioeconomic History    Marital status:    Tobacco Use    Smoking status: Former     Current packs/day: 0.00     Average packs/day: 1.5 packs/day for 18.0 years (27.0 ttl pk-yrs)     Types: Cigarettes, Cigars     Start date:      Quit date:      Years since quittin.3     Passive exposure: Past    Smokeless tobacco: Former     Types: Chew     Quit date:    Vaping Use    Vaping  status: Never Used   Substance and Sexual Activity    Alcohol use: No     Comment: 0    Drug use: No    Sexual activity: Defer     Allergies   Allergen Reactions    Flagyl [Metronidazole] Hives    Atorvastatin Other (See Comments) and Myalgia      - LIPITOR -   Muscle cramps    Ciprofloxacin Hives      - CIPRO -      Current Facility-Administered Medications   Medication Dose Route Frequency Provider Last Rate Last Admin    acetaminophen (TYLENOL) tablet 325 mg  325 mg Oral Q6H PRN Daisy, [place] A, DPM   325 mg at 05/01/25 1308    albuterol (PROVENTIL) nebulizer solution 0.083% 2.5 mg/3mL  2.5 mg Nebulization Q6H PRN Daisy, Hernan A, DPM        apixaban (ELIQUIS) tablet 5 mg  5 mg Oral BID Daisy, [place] A, DPM   5 mg at 05/02/25 0820    aspirin EC tablet 81 mg  81 mg Oral Daily Daisy, [place] A, DPM   81 mg at 05/02/25 0820    sennosides-docusate (PERICOLACE) 8.6-50 MG per tablet 2 tablet  2 tablet Oral BID PRN Daisy, Hernan A, DPM        And    polyethylene glycol (MIRALAX) packet 17 g  17 g Oral Daily PRN Daisy, [place] A, DPM        And    bisacodyl (DULCOLAX) EC tablet 5 mg  5 mg Oral Daily PRN Daisy, [place]ga A, DPM        And    bisacodyl (DULCOLAX) suppository 10 mg  10 mg Rectal Daily PRN Daisy, Hernan A, DPM        cefTRIAXone (ROCEPHIN) 1,000 mg in sodium chloride 0.9 % 100 mL MBP  1,000 mg Intravenous Q24H Daisy, Hernan A,  mL/hr at 05/01/25 2028 1,000 mg at 05/01/25 2028    dextrose (D50W) (25 g/50 mL) IV injection 25 g  25 g Intravenous Q15 Min PRN Daisy, Hernan A, DPM        dextrose (GLUTOSE) oral gel 15 g  15 g Oral Q15 Min PRN Daisy, Hernan A, DPM        docusate sodium (COLACE) capsule 100 mg  100 mg Oral Daily PRN Daisy, [place] A, DPM        empagliflozin (JARDIANCE) tablet 25 mg  25 mg Oral Daily Hernan Villa DPM   25 mg at 05/02/25 0821    fluticasone (FLONASE) 50 MCG/ACT nasal spray 2 spray  2 spray Nasal Daily Hernan Villa DPM   2 spray at 05/02/25 0821     furosemide (LASIX) tablet 40 mg  40 mg Oral Daily PRN Hernan Villa, DPM        gabapentin (NEURONTIN) capsule 300 mg  300 mg Oral Nightly Hernan Villa, DPM   300 mg at 05/01/25 2029    glucagon (GLUCAGEN) injection 1 mg  1 mg Intramuscular Q15 Min PRN Hernan Villa A, DPM        guaiFENesin (MUCINEX) 12 hr tablet 1,200 mg  1,200 mg Oral Daily PRN Hernan Villa, DPM        insulin glargine (LANTUS, SEMGLEE) injection 25 Units  25 Units Subcutaneous Q12H Hernan Villa, DPM   25 Units at 05/02/25 0819    Insulin Lispro (humaLOG) injection 3-14 Units  3-14 Units Subcutaneous 4x Daily AC & at Bedtime Hernan Villa DPM   5 Units at 05/02/25 1144    isosorbide mononitrate (IMDUR) 24 hr tablet 120 mg  120 mg Oral Daily Hernan Villa, DPM   120 mg at 05/02/25 0859    lamoTRIgine (LaMICtal) tablet 200 mg  200 mg Oral BID Hernan Villa, DPM   200 mg at 05/02/25 0820    levETIRAcetam (KEPPRA) tablet 1,500 mg  1,500 mg Oral Daily Hernan Villa, DPM   1,500 mg at 05/02/25 0820    levETIRAcetam (KEPPRA) tablet 2,000 mg  2,000 mg Oral Nightly Hernan Villa, DPM   2,000 mg at 05/01/25 2028    metoprolol tartrate (LOPRESSOR) tablet 100 mg  100 mg Oral BID Hernan Villa, DPM   100 mg at 05/02/25 0824    multivitamin with minerals 1 tablet  1 tablet Oral Daily Cecelia Moise RDN, LD   1 tablet at 05/02/25 0820    nitroglycerin (NITROSTAT) SL tablet 0.4 mg  0.4 mg Sublingual Q5 Min PRN Hernan Villa, DPM        ondansetron (ZOFRAN) injection 4 mg  4 mg Intravenous Q6H PRN Hernan Villa, DPM        pantoprazole (PROTONIX) EC tablet 40 mg  40 mg Oral BID Hernan Villa DPM   40 mg at 05/02/25 0820    polyethylene glycol (MIRALAX) packet 17 g  17 g Oral Daily Hernan Villa DPM   17 g at 04/26/25 2038    rosuvastatin (CRESTOR) tablet 20 mg  20 mg Oral Nightly Hernan Villa DPM   20 mg at 05/01/25 2029    sacubitril-valsartan (ENTRESTO)  MG tablet 1 tablet  1  tablet Oral BID Daisy, Hernan A, DPM   1 tablet at 05/02/25 0825    sodium chloride 0.9 % flush 10 mL  10 mL Intravenous Q12H Daisy, Hernan A, DPM   10 mL at 05/02/25 0822    sodium chloride 0.9 % flush 10 mL  10 mL Intravenous PRN Daisy, Hernan A, DPM        sodium chloride 0.9 % infusion 40 mL  40 mL Intravenous PRN Daisy, [place] A, DPM        tamsulosin (FLOMAX) 24 hr capsule 0.4 mg  0.4 mg Oral Daily Daisy, Hernan A, DPM   0.4 mg at 05/02/25 0821     Review of Systems   Constitutional:  Negative for chills and fever.   HENT:  Negative for congestion.    Respiratory:  Negative for shortness of breath.    Cardiovascular:  Negative for chest pain.   Gastrointestinal:  Negative for constipation, diarrhea, nausea and vomiting.   Musculoskeletal:         Foot pain   Skin:  Positive for color change and wound.   Neurological:  Positive for seizures and numbness.   Psychiatric/Behavioral:  Negative for agitation.        OBJECTIVE     Vitals:    05/02/25 0823   BP: 126/66   Pulse: 56   Resp: 18   Temp: 98.5 °F (36.9 °C)   SpO2: 93%       PHYSICAL EXAM  GEN:   Pt presents in hospital bed. Accompanied by none.     Physical Exam  Vitals reviewed.   Constitutional:       Appearance: Normal appearance.   HENT:      Head: Normocephalic.      Right Ear: Tympanic membrane normal.      Left Ear: Tympanic membrane normal.      Nose: Nose normal.      Mouth/Throat:      Pharynx: Oropharynx is clear.   Cardiovascular:      Rate and Rhythm: Normal rate.      Pulses: Normal pulses.           Dorsalis pedis pulses are 2+ on the right side and 2+ on the left side.        Posterior tibial pulses are 2+ on the right side and 2+ on the left side.      Heart sounds: Normal heart sounds.   Pulmonary:      Effort: Pulmonary effort is normal.      Breath sounds: Normal breath sounds.   Abdominal:      General: Bowel sounds are normal.   Musculoskeletal:      Cervical back: Normal range of motion and neck supple.   Feet:      Right  foot:      Skin integrity: Warmth present.      Left foot:      Skin integrity: Warmth present.   Neurological:      Mental Status: He is alert and oriented to person, place, and time.   Psychiatric:         Mood and Affect: Mood normal.         Behavior: Behavior normal.         Thought Content: Thought content normal.         Judgment: Judgment normal.          Foot/Ankle Exam    GENERAL  Appearance:  appears stated age  Orientation:  AAOx3  Affect:  appropriate  Gait:  partial weight bearing  Right shoe gear: surgical shoe    VASCULAR     Right Foot Vascularity   Dorsalis pedis:  2+  Posterior tibial:  2+  Skin temperature:  warm  Edema grading:  Trace  CFT:  3  Pedal hair growth:  Present     Left Foot Vascularity   Dorsalis pedis:  2+  Posterior tibial:  2+  Skin temperature:  warm  Edema grading:  Trace  CFT:  3  Pedal hair growth:  Present     NEUROLOGIC     Right Foot Neurologic   Light touch sensation: diminished  Vibratory sensation: diminished  Hot/Cold sensation: diminished     Left Foot Neurologic   Light touch sensation: diminished  Vibratory sensation: diminished  Hot/Cold sensation:  diminished    MUSCULOSKELETAL     Right Foot Musculoskeletal   Ecchymosis:  none  Tenderness:  toe 1 tenderness and toe 2 tenderness    Arch:  Normal     Left Foot Musculoskeletal   Ecchymosis:  none  Tenderness:  none  Arch:  Normal    MUSCLE STRENGTH     Right Foot Muscle Strength   Foot dorsiflexion:  4+  Foot plantar flexion:  4+  Foot inversion:  4+  Foot eversion:  4+     Left Foot Muscle Strength   Foot dorsiflexion:  4+  Foot plantar flexion:  4+  Foot inversion:  4+  Foot eversion:  4+    RANGE OF MOTION     Right Foot Range of Motion   Foot and ankle ROM within normal limits       Left Foot Range of Motion   Foot and ankle ROM within normal limits      DERMATOLOGIC      Right Foot Dermatologic   Skin  Positive for cellulitis.      Left Foot Dermatologic   Skin  Left foot skin is intact.      Right foot  additional comments: Surgical dressing removed today.  No signs of infection or drainage.  Dressing reapplied.           RADIOLOGY/NUCLEAR:  XR Toe 2+ View Right  Result Date: 4/30/2025  Narrative: XR TOE 2+ VW RIGHT- 4/30/2025 2:16 PM  HISTORY: post-op; G40.909-Epilepsy, unspecified, not intractable, without status epilepticus; S31.000A-Unspecified open wound of lower back and pelvis without penetration into retroperitoneum, initial encounter; L03.031-Cellulitis of right toe; A48.0-Gas gangrene; Z96.9-Presence of functional implant, unspecified; E11.59-Type 2 diabetes mellitus with other circulatory complications; Z79.4-Long term (curre  COMPARISON: 4/27/2025  FINDINGS: Frontal, lateral and oblique radiographs of the toes were provided for review.  Hardware from arthrodesis of the IP joint of the great toe is been removed. There is associated lucency and degenerative change at the first MTP joint and IP joint of the great toe. Patient is status post partial amputation of the second toe. There is mild osteopenia. No acute fracture is seen. Postoperative changes are noted in the soft tissues.      Impression:  1. Removal of hardware at the great toe with associated lucency and degenerative changes. 2. Partial amputation of the second toe.  This report was signed and finalized on 4/30/2025 4:10 PM by Hari Bonilla.      MRI Brain With & Without Contrast  Result Date: 4/28/2025  Narrative: MRI BRAIN W WO CONTRAST- 4/28/2025 6:20 AM  HISTORY: recurrent breakthrough seizures; G40.909-Epilepsy, unspecified, not intractable, without status epilepticus  TECHNIQUE: Multisequence, multiplanar MRI of the brain before and after the intravenous administration of Vueway contrast.  COMPARISON: Brain MRI dated 12/1/2017  FINDINGS:  No diffusion signal abnormality. No intra-axial or extra-axial hemorrhage. No intracranial mass lesion or pathologic enhancement. Moderate chronic small vessel ischemic change. Hippocampal volumes  appear symmetric. No obvious hippocampal sclerosis. Incidental tiny hippocampal cysts. Posterior fossa structures are unremarkable. Pituitary gland and sella are unremarkable. The major intracranial flow-voids are preserved. Orbital contents are unremarkable. The paranasal sinuses are clear. Partial right mastoid effusion. Normal marrow signal in the skull base and calvarium.      Impression:  1.  No acute intracranial process. 2.  No intracranial mass lesion or pathologic enhancement. 3.  No obvious hippocampal sclerosis. Hippocampal volumes and signal appear symmetric. 4.  Underlying moderate chronic small vessel ischemic change.  This report was signed and finalized on 4/28/2025 9:12 AM by Dr Merritt Reaves.      EEG  Result Date: 4/27/2025  Narrative: Reason for referral: 65 y.o.male with seizure Technical Summary:  A 19 channel digital EEG was performed using the international 10-20 placement system, including eye leads and EKG leads. Duration: 22 minutes Findings: The patient is asleep at the beginning of the study.  The background shows diffuse low amplitude theta present symmetrically over both hemispheres.  Toward the end of the study when the patient awakened there is an increase in faster theta and alpha frequencies.  A clear posterior rhythm is not seen.  Photic stimulation does not change the background.  Hyperventilation is not performed.  No focal features or epileptiform activity are present. Video: Available Technical quality: Good Rhythm strip: Regular, 60 bpm SUMMARY: Excessive drowsiness Otherwise unremarkable EEG in the mostly asleep and briefly awake states No focal features or epileptiform activity are seen     Impression: Normal study This report is transcribed using the Dragon dictation system.      XR Foot 2 View Right  Result Date: 4/27/2025  Narrative: XR FOOT 2 VW RIGHT-  HISTORY: right 2nd toe with wound and redness radiating up foot; G40.909-Epilepsy, unspecified, not intractable,  without status epilepticus  COMPARISON: 7/27/2024  FINDINGS: 2 views of the right foot are obtained.  Surgical screw of the great toe from prior interphalangeal arthrodesis does demonstrate hardware loosening with mild retraction of the screw within the bony structure of the great toe. Advanced chronic arthritic changes of the first MTP and interphalangeal joint of the great toe.  There is soft tissue swelling of the distal right second toe no convincing radiographic evidence of underlying osteomyelitis. There is small soft tissue air/subcutaneous emphysema within the edematous distal right second toe which may correspond to a region ulcerative defect. Calcaneal spurring. Similar calcaneal osteotomy changes suspected. Mild arthritic change midfoot      Impression: 1. Soft tissue swelling and mild subcutaneous emphysema involving the soft tissues of the distal right second toe with no convincing radiographic evidence of underlying osteomyelitis. 2. Hardware loosening involving the surgical screw of the great toe with mild retraction of the surgical screw compared to 7/27/2024.  This report was signed and finalized on 4/27/2025 11:42 AM by Dr. Ml Estrada MD.      CT Head Without Contrast  Result Date: 4/26/2025  Narrative: EXAMINATION: CT HEAD WO CONTRAST-  4/26/2025 4:58 PM  HISTORY: seizures  COMPARISON: 9/21/2023  DLP: 1069.19 mGy.cm  In order to have a CT radiation dose as low as reasonably achievable, Automated Exposure Control was utilized for adjustment of the mA and/or KV according to patient size.  TECHNIQUE: Serial axial tomographic images of the brain were obtained without the use of intravenous contrast.  Coronal and sagittal reformatted images were obtained reviewed as well.  FINDINGS: No mass effect or midline shift. No acute hemorrhage. There is mild low-attenuation in the subcortical and periventricular white matter as can be seen with chronic microvascular change. Vascular calcifications in the  vertebral arteries in the intracranial portions of the carotid arteries.  Surrounding soft tissues are without acute findings. Orbits and globes are preserved. Paranasal sinus disease with an air-fluid level in the RIGHT maxillary sinus suggesting mild acute sinusitis. Mild scattered mucosal thickening in the ethmoid air cells. Small mastoid effusion on the RIGHT, unchanged from 2023 and likely chronic.       Impression:  1.  Mild atrophy and chronic white matter changes but no definite acute intracranial process.  2.  Paranasal sinus disease as discussed above.  This report was signed and finalized on 4/26/2025 5:00 PM by Dr. Valentino Lebron MD.      XR Chest 1 View  Result Date: 4/26/2025  Narrative: XR CHEST 1 VW-  HISTORY: rule out pneumonia; R56.9-Unspecified convulsions  COMPARISON: 12/26/2024  FINDINGS: Frontal view of the chest obtained.  Stable cardiomegaly with evidence of prior mediastinal surgery. Chronic mild prominence of pulmonary vascular structures with no acute consolidation. No pleural effusion or pneumothorax. No acute regional bony pathology.      Impression: 1. Prior mediastinal surgery with stable cardiomegaly and mild chronic change. No acute abnormality.   This report was signed and finalized on 4/26/2025 10:14 AM by Dr. Ml Estrada MD.      CT Angiogram Chest  Result Date: 4/9/2025  Narrative: EXAMINATION: CT ANGIOGRAM CHEST-   4/9/2025 8:42 AM  HISTORY: Ascending Aortic Ansurysm; I71.21-Aneurysm of the ascending aorta, without rupture  In order to have a CT radiation dose as low as reasonably achievable Automated Exposure Control was utilized for adjustment of the mA and/or KV according to patient size.  CT Dose DLP = 420.13 mGy.cm. (If there are multiple studies performed at the same time this represents the total dose).  Images are stored in PACS per institutional protocol.  CT angiogram chest with IV contrast injection. CT angiography protocol. CT imaging with bolus IV contrast  injection. Under concurrent supervision axial, sagittal, coronal, three-dimensional, and MIP data sets were constructed on an independent work station.  Comparison: 2/14/2024.  Heart size is at the upper limits of normal. Median sternotomy changes and coronary artery calcification. Symmetric and normally opacified pulmonary arteries. Unchanged 14 mm linear metal structure within the LEFT pulmonary artery. No mediastinal mass. Normal CT appearance of the thyroid gland.  Stable dilated ascending thoracic aorta measuring 41 x 45 mm on axial image 66. Stable 34 mm aortic root measurement on coronal image 53. Stable 38 mm diameter of the distal ascending aorta near the innominate artery origin. No dissection.  Moderate degenerative disc and endplate change throughout the thoracic spine. Multiple (approximately 10) tiny scattered lung nodules are unchanged.      Impression: 1. Stable size dilated ascending thoracic aorta. 2. No significant change from last year.  This report was signed and finalized on 4/9/2025 10:32 AM by Dr. Celso Wynn MD.        LABORATORY/CULTURE RESULTS:  Results from last 7 days   Lab Units 05/02/25 0317 05/01/25 0242 04/30/25 0319   WBC 10*3/mm3 5.12 5.36 3.91   HEMOGLOBIN g/dL 13.0 13.4 13.2   HEMATOCRIT % 40.7 40.8 39.8   PLATELETS 10*3/mm3 104* 91* 85*     Results from last 7 days   Lab Units 05/02/25 0317 05/01/25  0242 04/30/25  0319 04/29/25  0841 04/28/25  0515 04/27/25  0409 04/26/25  1652   SODIUM mmol/L 136 133* 138   < > 136 136 136   POTASSIUM mmol/L 4.2 4.1 4.6   < > 4.0 4.9 4.5   CHLORIDE mmol/L 104 105 104   < > 104 99 100   CO2 mmol/L 21.0* 21.0* 22.0   < > 21.0* 23.0 23.0   BUN mg/dL 19 16 21   < > 20 15 15   CREATININE mg/dL 0.89 0.87 0.87   < > 0.93 1.02 1.10   CALCIUM mg/dL 8.7 8.5* 8.8   < > 8.6 8.9 9.3   BILIRUBIN mg/dL  --   --   --   --  0.9 2.0* 1.6*   ALK PHOS U/L  --   --   --   --  59 63 64   ALT (SGPT) U/L  --   --   --   --  23 26 25   AST (SGOT) U/L  --   --    --   --  27 42* 31   GLUCOSE mg/dL 218* 205* 213*   < > 140* 177* 164*    < > = values in this interval not displayed.         Microbiology Results (last 10 days)       Procedure Component Value - Date/Time    Blood Culture With STACY - Blood, Arm, Left [[id number]]  (Normal) Collected: 04/27/25 0850    Lab Status: Final result Specimen: Blood from Arm, Left Updated: 05/02/25 1000     Blood Culture No growth at 5 days    Blood Culture With STACY - Blood, Arm, Right [[id number]]  (Normal) Collected: 04/27/25 0850    Lab Status: Final result Specimen: Blood from Arm, Right Updated: 05/02/25 1015     Blood Culture No growth at 5 days    Respiratory Panel PCR w/COVID-19(SARS-CoV-2) [facility]/YOSI/[facility] In-House, NP Swab in UTM/VTM, 2 HR TAT - Swab, Nasopharynx [[id number]]  (Normal) Collected: 04/26/25 0050    Lab Status: Final result Specimen: Swab from Nasopharynx Updated: 04/26/25 0145     ADENOVIRUS, PCR Not Detected     Coronavirus 229E Not Detected     Coronavirus HKU1 Not Detected     Coronavirus NL63 Not Detected     Coronavirus OC43 Not Detected     COVID19 Not Detected     Human Metapneumovirus Not Detected     Human Rhinovirus/Enterovirus Not Detected     Influenza A PCR Not Detected     Influenza B PCR Not Detected     Parainfluenza Virus 1 Not Detected     Parainfluenza Virus 2 Not Detected     Parainfluenza Virus 3 Not Detected     Parainfluenza Virus 4 Not Detected     RSV, PCR Not Detected     Bordetella pertussis pcr Not Detected     Bordetella parapertussis PCR Not Detected     Chlamydophila pneumoniae PCR Not Detected     Mycoplasma pneumo by PCR Not Detected    Narrative:      In the setting of a positive respiratory panel with a viral infection PLUS a negative procalcitonin without other underlying concern for bacterial infection, consider observing off antibiotics or discontinuation of antibiotics and continue supportive care. If the respiratory panel is positive for atypical bacterial infection  (Bordetella pertussis, Chlamydophila pneumoniae, or Mycoplasma pneumoniae), consider antibiotic de-escalation to target atypical bacterial infection.            PATHOLOGY RESULTS:       ASSESSMENT/PLAN   Status post right hallux hardware removal and right second toe partial amputation  DM2 with Neuropathy  Seizure    Review of labs, imaging, and other provider documentation  Comprehensive lower extremity examination and evaluation was performed.    Surgical dressing removed today.  No signs of infection or drainage.  Similar dressing reapplied.    Recommendation for patient to begin use of a CAM boot to assist with ambulation.  Patient will need stabilization during ambulation.  Patient may be partial weightbearing to heel only.  He reports he will need a walker upon discharge.    Patient may be discharged from podiatry standpoint.  He will follow-up in outpatient podiatry in 1 week upon discharged..     We will continue to follow patient while he is admitted.    This document has been electronically signed by TEENA Nice on May 2, 2025 13:15 CDT

## 2025-05-02 NOTE — CASE MANAGEMENT/SOCIAL WORK
Continued Stay Note  [facility]     Patient Name: Carlos A Boyer Jr.  MRN: [id number]  Today's Date: 5/2/2025    Admit Date: 4/26/2025    Plan: Home   Discharge Plan       Row Name 05/02/25 1628       Plan    Final Discharge Disposition Code 01 - home or self-care    Final Note Pt is being dc'd home today. Orders for rw received. [facility] to [facility] to [facility]. Pt states he has a walker that is in storage. Pt aware RW won't be delivered until at least Monday (5/5). SW has submitted RFS and order for RW to [facility].      Row Name 05/02/25 9250       Plan    Plan Comments Pt is doing well with therapy and plans to return home at [place]. Noted therapy did not recommend knee scooter due to safety. Will continue to follow.                   Discharge Codes    No documentation.                 Expected Discharge Date and Time       Expected Discharge Date Expected Discharge Time    May 2, 2025               VIKI Mcmullen

## 2025-05-02 NOTE — PLAN OF CARE
Goal Outcome Evaluation:  Plan of Care Reviewed With: patient, spouse        Progress: no change     Alert and oriented x4. Tele on. VSS. Safety maintained.

## 2025-05-02 NOTE — DISCHARGE SUMMARY
[facility] Medicine Services  DISCHARGE SUMMARY       Date of Admission: 4/26/2025  Date of Discharge:  5/2/2025  Primary Care Physician: Vinayak Correia PA    Presenting Problem/History of Present Illness:  Carlos A Boyer Jr.  Is a 65 y.o. male with pmh of seizure disorder, diabetes, HFpEF, carotid disease amongst other problems who presented with complaint of breakthrough seizures.    Final Discharge Diagnoses:  Active Hospital Problems    Diagnosis     **Recurrent seizures     Gas gangrene of foot     Retained orthopedic hardware     Chronic heart failure with preserved ejection fraction (HFpEF)     Cellulitis of right toe     Presence of CardioMEMS HF system     S/P CABG x 2     Chronic diastolic congestive heart failure with preserved ejection fraction     Atrial flutter     Class 1 obesity due to excess calories with serious comorbidity and body mass index (BMI) of 34.0 to 34.9 in adult     Diabetic peripheral neuropathy     Diabetic complication     Deviated nasal septum     Maribell bullosa     Benign non-nodular prostatic hyperplasia with lower urinary tract symptoms     Gastroesophageal reflux disease without esophagitis     Type 2 diabetes mellitus with circulatory disorder, with long-term current use of insulin        Consults: neurology, podiatry    Procedures Performed: partial right 2nd toe amputation - 4/30/25    Pertinent Test Results:   Results for orders placed during the hospital encounter of 04/17/23    Adult Transthoracic Echo Complete W/ Cont if Necessary Per Protocol    Interpretation Summary    Left ventricular systolic function is normal. Left ventricular ejection fraction appears to be 61 - 65%.    The following left ventricular wall segments are very mildly hypokinetic: apical inferior, apical septal and apex hypokinetic.    Left ventricular diastolic function is consistent with (grade II w/high LAP) pseudonormalization.    Left atrial volume is  mildly increased.    Estimated right ventricular systolic pressure from tricuspid regurgitation is normal (<35 mmHg).    Normal size and function of the right ventricle.    No significant valvular pathology.    No significant change compared to prior exam from 4/29/21.      Imaging Results (All)       Procedure Component Value Units Date/Time    XR Toe 2+ View Right [[id number]] Collected: 04/30/25 1607     Updated: 04/30/25 1613    Narrative:      XR TOE 2+ VW RIGHT- 4/30/2025 2:16 PM     HISTORY: post-op; G40.909-Epilepsy, unspecified, not intractable,  without status epilepticus; S31.000A-Unspecified open wound of lower  back and pelvis without penetration into retroperitoneum, initial  encounter; L03.031-Cellulitis of right toe; A48.0-Gas gangrene;  Z96.9-Presence of functional implant, unspecified; E11.59-Type 2  diabetes mellitus with other circulatory complications; Z79.4-Long term  (curre     COMPARISON: 4/27/2025     FINDINGS:  Frontal, lateral and oblique radiographs of the toes were provided for  review.     Hardware from arthrodesis of the IP joint of the great toe is been  removed. There is associated lucency and degenerative change at the  first MTP joint and IP joint of the great toe. Patient is status post  partial amputation of the second toe. There is mild osteopenia. No acute  fracture is seen. Postoperative changes are noted in the soft tissues.       Impression:         1. Removal of hardware at the great toe with associated lucency and  degenerative changes.  2. Partial amputation of the second toe.     This report was signed and finalized on 4/30/2025 4:10 PM by Hari Bonilla.       MRI Brain With & Without Contrast [[id number]] Collected: 04/28/25 0905     Updated: 04/28/25 0915    Narrative:      MRI BRAIN W WO CONTRAST- 4/28/2025 6:20 AM     HISTORY: recurrent breakthrough seizures; G40.909-Epilepsy, unspecified,  not intractable, without status epilepticus     TECHNIQUE: Multisequence,  multiplanar MRI of the brain before and after  the intravenous administration of Vueway contrast.     COMPARISON: Brain MRI dated 12/1/2017     FINDINGS:     No diffusion signal abnormality. No intra-axial or extra-axial  hemorrhage. No intracranial mass lesion or pathologic enhancement.  Moderate chronic small vessel ischemic change. Hippocampal volumes  appear symmetric. No obvious hippocampal sclerosis. Incidental tiny  hippocampal cysts. Posterior fossa structures are unremarkable.  Pituitary gland and sella are unremarkable. The major intracranial  flow-voids are preserved. Orbital contents are unremarkable. The  paranasal sinuses are clear. Partial right mastoid effusion. Normal  marrow signal in the skull base and calvarium.       Impression:         1.  No acute intracranial process.  2.  No intracranial mass lesion or pathologic enhancement.  3.  No obvious hippocampal sclerosis. Hippocampal volumes and signal  appear symmetric.  4.  Underlying moderate chronic small vessel ischemic change.     This report was signed and finalized on 4/28/2025 9:12 AM by Dr Merritt Reaves.       XR Foot 2 View Right [[id number]] Collected: 04/27/25 1135     Updated: 04/27/25 1145    Narrative:      XR FOOT 2 VW RIGHT-     HISTORY: right 2nd toe with wound and redness radiating up foot;  G40.909-Epilepsy, unspecified, not intractable, without status  epilepticus     COMPARISON: 7/27/2024     FINDINGS: 2 views of the right foot are obtained.     Surgical screw of the great toe from prior interphalangeal arthrodesis  does demonstrate hardware loosening with mild retraction of the screw  within the bony structure of the great toe. Advanced chronic arthritic  changes of the first MTP and interphalangeal joint of the great toe.     There is soft tissue swelling of the distal right second toe no  convincing radiographic evidence of underlying osteomyelitis. There is  small soft tissue air/subcutaneous emphysema within the edematous  distal  right second toe which may correspond to a region ulcerative defect.  Calcaneal spurring. Similar calcaneal osteotomy changes suspected. Mild  arthritic change midfoot       Impression:      1. Soft tissue swelling and mild subcutaneous emphysema involving the  soft tissues of the distal right second toe with no convincing  radiographic evidence of underlying osteomyelitis.  2. Hardware loosening involving the surgical screw of the great toe with  mild retraction of the surgical screw compared to 7/27/2024.     This report was signed and finalized on 4/27/2025 11:42 AM by Dr. Ml Estrada MD.       CT Head Without Contrast [[id number]] Collected: 04/26/25 1658     Updated: 04/26/25 1703    Narrative:      EXAMINATION: CT HEAD WO CONTRAST-  4/26/2025 4:58 PM     HISTORY: seizures     COMPARISON: 9/21/2023     DLP: 1069.19 mGy.cm     In order to have a CT radiation dose as low as reasonably achievable,  Automated Exposure Control was utilized for adjustment of the mA and/or  KV according to patient size.     TECHNIQUE: Serial axial tomographic images of the brain were obtained  without the use of intravenous contrast.  Coronal and sagittal  reformatted images were obtained reviewed as well.     FINDINGS:  No mass effect or midline shift. No acute hemorrhage. There is mild  low-attenuation in the subcortical and periventricular white matter as  can be seen with chronic microvascular change. Vascular calcifications  in the vertebral arteries in the intracranial portions of the carotid  arteries.     Surrounding soft tissues are without acute findings. Orbits and globes  are preserved. Paranasal sinus disease with an air-fluid level in the  RIGHT maxillary sinus suggesting mild acute sinusitis. Mild scattered  mucosal thickening in the ethmoid air cells. Small mastoid effusion on  the RIGHT, unchanged from 2023 and likely chronic.          Impression:         1.  Mild atrophy and chronic white matter changes  but no definite acute  intracranial process.     2.  Paranasal sinus disease as discussed above.     This report was signed and finalized on 4/26/2025 5:00 PM by Dr. Valentino Lebron MD.             LAB RESULTS:      Lab 05/02/25 0317 05/01/25 0242 04/30/25 0319 04/29/25  0841 04/28/25  0515 04/27/25  0409 04/26/25  1652 04/25/25  2341   WBC 5.12 5.36 3.91 3.35* 3.81 5.72 7.83 3.70   HEMOGLOBIN 13.0 13.4 13.2 13.5 13.7 15.2 14.5 14.8   HEMATOCRIT 40.7 40.8 39.8 41.0 40.6 46.9 43.9 44.1   PLATELETS 104* 91* 85* 79* 73* 73* 87* 105*   NEUTROS ABS  --   --   --   --   --  4.82 7.01* 3.21   IMMATURE GRANS (ABS)  --   --   --   --   --  0.03 0.03 0.01   LYMPHS ABS  --   --   --   --   --  0.51* 0.44* 0.32*   MONOS ABS  --   --   --   --   --  0.35 0.32 0.05*   EOS ABS  --   --   --   --   --  0.00 0.01 0.09   MCV 93.1 92.1 91.9 91.1 90.4 93.2 91.8 90.4         Lab 05/02/25 0317 05/01/25 0242 04/30/25 0319 04/29/25  0841 04/28/25  0515 04/27/25  0409 04/26/25 1652 04/25/25  2341   SODIUM 136 133* 138 136 136 136 136 137   POTASSIUM 4.2 4.1 4.6 4.2 4.0 4.9 4.5 5.0   CHLORIDE 104 105 104 103 104 99 100 102   CO2 21.0* 21.0* 22.0 21.0* 21.0* 23.0 23.0 20.0*   ANION GAP 11.0 7.0 12.0 12.0 11.0 14.0 13.0 15.0   BUN 19 16 21 19 20 15 15 18   CREATININE 0.89 0.87 0.87 0.90 0.93 1.02 1.10 1.10   EGFR 93.9 94.6 94.6 93.6 90.0 80.6 73.6 73.6   GLUCOSE 218* 205* 213* 201* 140* 177* 164* 209*   CALCIUM 8.7 8.5* 8.8 8.8 8.6 8.9 9.3 9.1   MAGNESIUM  --   --   --   --   --   --  1.8 1.8   PHOSPHORUS  --   --   --   --   --   --   --  2.4*   HEMOGLOBIN A1C  --   --   --   --   --  7.60*  --   --    TSH  --   --   --   --   --  0.640  --   --          Lab 04/28/25  0515 04/27/25  0409 04/26/25  1652 04/25/25  2341   TOTAL PROTEIN 6.2 7.0 7.1 7.1   ALBUMIN 3.4* 4.0 4.2 4.3   GLOBULIN 2.8 3.0 2.9 2.8   ALT (SGPT) 23 26 25 19   AST (SGOT) 27 42* 31 18   BILIRUBIN 0.9 2.0* 1.6* 1.2   ALK PHOS 59 63 64 90             Lab 04/27/25  0409    CHOLESTEROL 97   LDL CHOL 46   HDL CHOL 30*   TRIGLYCERIDES 115             Brief Urine Lab Results  (Last result in the past 365 days)        Color   Clarity   Blood   Leuk Est   Nitrite   Protein   CREAT   Urine HCG        04/26/25 0050 Yellow   Clear   Negative   Negative   Negative   100 mg/dL (2+)                 Microbiology Results (last 10 days)       Procedure Component Value - Date/Time    Blood Culture With STACY - Blood, Arm, Left [[id number]]  (Normal) Collected: 04/27/25 0850    Lab Status: Final result Specimen: Blood from Arm, Left Updated: 05/02/25 1000     Blood Culture No growth at 5 days    Blood Culture With STACY - Blood, Arm, Right [[id number]]  (Normal) Collected: 04/27/25 0850    Lab Status: Final result Specimen: Blood from Arm, Right Updated: 05/02/25 1015     Blood Culture No growth at 5 days    Respiratory Panel PCR w/COVID-19(SARS-CoV-2) [facility]/RAMESH/YOSI/PAD/COR/NGUEYN In-House, NP Swab in UTM/VTM, 2 HR TAT - Swab, Nasopharynx [[id number]]  (Normal) Collected: 04/26/25 0050    Lab Status: Final result Specimen: Swab from Nasopharynx Updated: 04/26/25 0145     ADENOVIRUS, PCR Not Detected     Coronavirus 229E Not Detected     Coronavirus HKU1 Not Detected     Coronavirus NL63 Not Detected     Coronavirus OC43 Not Detected     COVID19 Not Detected     Human Metapneumovirus Not Detected     Human Rhinovirus/Enterovirus Not Detected     Influenza A PCR Not Detected     Influenza B PCR Not Detected     Parainfluenza Virus 1 Not Detected     Parainfluenza Virus 2 Not Detected     Parainfluenza Virus 3 Not Detected     Parainfluenza Virus 4 Not Detected     RSV, PCR Not Detected     Bordetella pertussis pcr Not Detected     Bordetella parapertussis PCR Not Detected     Chlamydophila pneumoniae PCR Not Detected     Mycoplasma pneumo by PCR Not Detected    Narrative:      In the setting of a positive respiratory panel with a viral infection PLUS a negative procalcitonin without other underlying concern for  bacterial infection, consider observing off antibiotics or discontinuation of antibiotics and continue supportive care. If the respiratory panel is positive for atypical bacterial infection (Bordetella pertussis, Chlamydophila pneumoniae, or Mycoplasma pneumoniae), consider antibiotic de-escalation to target atypical bacterial infection.            Hospital Course:   Carols A Boyer Jr.  Is a 65 y.o. male with pmh of seizure disorder, diabetes, HFpEF, carotid disease amongst other problems who presented with complaint of breakthrough seizures.Admitted 4/26 evaluated by neurology and podiatry.    # Recurrent seizures  Reported 5 seizures at home, adherence to meds  Previously, last breakthrough seizure was > 3 years ago  CT Head: Mild atrophy and chronic white matter changes but no definite acute intracranial process. Paranasal sinus disease with an air-fluid level in the right maxillary sinus suggesting mild acute sinusitis. Mild scattered mucosal thickening in the ethmoid air cells.  MRI brain: No acute findings.  - Continue levetiracetam and lamotrigine  - foot infection thought to have lowered   - He will need to follow up with neurology in 6-8 weeks       # Right Foot Infection s/p right second toe amputation 4/30/25  Tmax 101.5F on 4/26  Foot x-ray showing soft tissue swelling, mild emphysema and hardware loosening.    Blood cultures negative  - received 7 days of ceftriaxone  - pain management has been wall managed with tylenol  - Patient refusing home health due to large dog in his home.  He would like outpatient physical therapy instead.Referral sent.     # HFpEF s/p cardioMEMS 5/2023  # CAD s/p CABG  # Atrial flutter - on Eliquis   -Continue Imdur, statin, Lopressor, and Eliquis     # HOA - on CPAP     # Type II Diabetes with Neuropathy - HgbA1c 7.6%  - Continue Lantus,  Jardiance, gabapentin     # Class I Obesity - Body mass index is 32.42 kg/m².        Physical Exam on Discharge:  /65 (BP Location:  "Left arm, Patient Position: Sitting)   Pulse 58   Temp 97.8 °F (36.6 °C) (Oral)   Resp 16   Ht 182.9 cm (72\")   Wt 108 kg (239 lb 1.6 oz)   SpO2 94%   BMI 32.42 kg/m²   Physical Exam  GEN: Awake, alert, interactive, in NAD on room air   HEENT: Atraumatic, EOMI, Anicteric  Lungs: CTAB  Heart: RRR  ABD: soft, nt/nd, +BS, no guarding/rebound  Extremities: right foot in clean dressing and boot  Skin: no rashes or lesions  Neuro: AAOx3, no focal deficits  Psych: normal mood & affect    Condition on Discharge: Stable    Discharge Disposition:  Home or Self Care    Discharge Medications:     Discharge Medications        Continue These Medications        Instructions Start Date   acetaminophen 325 MG tablet  Commonly known as: TYLENOL   325 mg, Oral, Every 6 Hours PRN      apixaban 5 MG tablet tablet  Commonly known as: ELIQUIS   5 mg, 2 Times Daily      aspirin 81 MG EC tablet   81 mg, Daily      docusate sodium 100 MG capsule  Commonly known as: COLACE   100 mg, Oral, Daily PRN      empagliflozin 25 MG tablet tablet  Commonly known as: JARDIANCE   25 mg, Oral, Daily      Entresto  MG tablet  Generic drug: sacubitril-valsartan   1 tablet, 2 Times Daily      ezetimibe 10 MG tablet  Commonly known as: ZETIA   10 mg, Daily      fluticasone 50 MCG/ACT nasal spray  Commonly known as: FLONASE   2 sprays, Daily      furosemide 40 MG tablet  Commonly known as: Lasix   40 mg, Oral, Daily PRN      gabapentin 300 MG capsule  Commonly known as: NEURONTIN   600 mg, 2 Times Daily      guaiFENesin 600 MG 12 hr tablet  Commonly known as: MUCINEX   1,200 mg, Daily PRN      insulin aspart 100 UNIT/ML solution pen-injector sc pen  Commonly known as: novoLOG FLEXPEN   50 Units, Subcutaneous, 3 Times Daily With Meals, ADMINISTER 10 MINUTES BEFORE FOOD AS DIRECTED. REFRIGERATE UN-OPENED PENS. DISCARD CARTRIDGE 28 DAYS AFTER OPENING.      insulin glargine 100 UNIT/ML injection  Commonly known as: LANT SEMGLEE   30 Units, Daily    "   isosorbide mononitrate 120 MG 24 hr tablet  Commonly known as: IMDUR   120 mg, Oral, Daily      lamoTRIgine 200 MG tablet  Commonly known as: LaMICtal   200 mg, Oral, 2 Times Daily      levETIRAcetam 500 MG tablet  Commonly known as: KEPPRA   1,500 mg, Oral, Every Morning      levETIRAcetam 500 MG tablet  Commonly known as: KEPPRA   2,000 mg, Oral, Nightly      metFORMIN 1000 MG tablet  Commonly known as: GLUCOPHAGE   1,000 mg, 2 Times Daily With Meals      metoprolol tartrate 100 MG tablet  Commonly known as: LOPRESSOR   100 mg, 2 Times Daily      multivitamin tablet tablet  Commonly known as: THERAGRAN   1 tablet, Oral, Daily      nitroglycerin 0.4 MG SL tablet  Commonly known as: NITROSTAT   0.4 mg, Sublingual, Every 5 Minutes PRN, Take no more than 3 doses in 15 minutes.      pantoprazole 40 MG EC tablet  Commonly known as: PROTONIX   40 mg, 2 Times Daily      polycarbophil 625 MG tablet tablet   625 mg, Daily      polyethylene glycol 17 g packet  Commonly known as: MIRALAX   17 g, Daily      rimegepant sulfate ODT 75 MG disintegrating tablet  Commonly known as: Nurtec   75 mg, Sublingual, Once As Needed      rosuvastatin 40 MG tablet  Commonly known as: CRESTOR   20 mg, Nightly      tamsulosin 0.4 MG capsule 24 hr capsule  Commonly known as: FLOMAX   1 capsule, Daily      traMADol 50 MG tablet  Commonly known as: ULTRAM   50 mg, 4 Times Daily PRN               Discharge Diet: Diabetic and cardiac diet    Activity at Discharge: stable    Follow-up Appointments:   Future Appointments   Date Time Provider Department Center   5/6/2025 10:30 AM Cristel Driscoll APRN [facility] POD [facility]   10/8/2025 10:00 AM Komal Herzog APRN [facility] N [facility]   4/15/2026  1:00 PM Skye Olson APRN [facility] CTS  [facility] P[facility]       Test Results Pending at Discharge: none    Electronically signed by Angela Cesar MD, 05/02/25, 15:46 CDT.    Time: 33 minutes.

## 2025-05-03 ENCOUNTER — NURSE TRIAGE (OUTPATIENT)
Dept: CALL CENTER | Facility: HOSPITAL | Age: 65
End: 2025-05-03
Payer: OTHER GOVERNMENT

## 2025-05-03 LAB
A PHAGOCYTOPH DNA BLD QL NAA+PROBE: NEGATIVE
EHRLICHIA DNA SPEC QL NAA+PROBE: NEGATIVE

## 2025-05-03 NOTE — THERAPY DISCHARGE NOTE
Acute Care - Physical Therapy Discharge Summary  [facility]       Patient Name: Carlos A Boyer Jr.  : 1960  MRN: [id number]    Today's Date: 5/3/2025                 Admit Date: 2025      PT Recommendation and Plan    Visit Dx:    ICD-10-CM ICD-9-CM   1. Recurrent seizures  G40.909 345.80   2. Wound of sacral region, initial encounter  S31.000A 959.19   3. Cellulitis of right toe  L03.031 681.10   4. Gas gangrene of foot  A48.0 040.0   5. Retained orthopedic hardware  Z96.9 V45.89   6. Type 2 diabetes mellitus with other circulatory complication, with long-term current use of insulin  E11.59 250.70    Z79.4 V58.67   7. Impaired mobility [Z74.09]  Z74.09 799.89                PT Rehab Goals       Row Name 25 1548             Transfer Goal 1 (PT)    Activity/Assistive Device (Transfer Goal 1, PT) sit-to-stand/stand-to-sit;bed-to-chair/chair-to-bed;walker, rolling  -AH      Mono Level/Cues Needed (Transfer Goal 1, PT) modified independence  -AH      Time Frame (Transfer Goal 1, PT) long term goal (LTG);by discharge  -AH      Strategies/Barriers (Transfers Goal 1, PT) maintain WB status  -AH      Progress/Outcome (Transfer Goal 1, PT) goal not met  -AH         Gait Training Goal 1 (PT)    Activity/Assistive Device (Gait Training Goal 1, PT) gait (walking locomotion);assistive device use;decrease fall risk;increase endurance/gait distance;improve balance and speed;walker, rolling  -AH      Mono Level (Gait Training Goal 1, PT) modified independence  -AH      Distance (Gait Training Goal 1, PT) 25ft maintaining WB status  -AH      Time Frame (Gait Training Goal 1, PT) long term goal (LTG);by discharge  -AH      Progress/Outcome (Gait Training Goal 1, PT) goal not met  -AH         Stairs Goal 1 (PT)    Activity/Assistive Device (Stairs Goal 1, PT) ascending stairs;descending stairs;using handrail, left;step-to-step  -      Mono Level/Cues Needed (Stairs Goal 1, PT) contact  guard required;tactile cues required;verbal cues required  -      Number of Stairs (Stairs Goal 1, PT) 5  -AH      Time Frame (Stairs Goal 1, PT) long term goal (LTG);by discharge  -      Progress/Outcome (Stairs Goal 1, PT) goal met  -                User Key  (r) = Recorded By, (t) = Taken By, (c) = Cosigned By      Initials Name Provider Type Discipline     Carina West PTA Physical Therapist Assistant PT                        PT Discharge Summary  Reason for Discharge: Discharge from facility  Outcomes Achieved: Refer to plan of care for updates on goals achieved  Discharge Destination: Home      Carina West PTA   5/3/2025

## 2025-05-03 NOTE — OUTREACH NOTE
Prep Survey      Flowsheet Row Responses   [facility]-ism facility patient discharged from? [place]   Is LACE score < 7 ? No   Eligibility Readm Mgmt   Discharge diagnosis Breakthrough seizure.   Does the patient have one of the following disease processes/diagnoses(primary or secondary)? Other   Does the patient have Home health ordered? No   Is there a DME ordered? No   Prep survey completed? Yes            MESSI SEGURA - Registered Nurse

## 2025-05-03 NOTE — TELEPHONE ENCOUNTER
"He was d/c from  [facility]. 05/02/2025, The walking boot after surgery, that we got, today we went to put it on and the swelling has gone down and boot will not stay on foot. Dr. Villa did his surgery, told her to call the on call for Dr. Villa, and check with local pharmacy to see about new boot., if cannot reach him.    Reason for Disposition   [1] Caller requesting NON-URGENT health information AND [2] PCP's office is the best resource    Additional Information   Negative: [1] Caller is not with the adult (patient) AND [2] reporting urgent symptoms   Negative: Lab result questions   Negative: Medication questions   Negative: Caller can't be reached by phone   Negative: Caller has already spoken to PCP or another triager   Negative: RN needs further essential information from caller in order to complete triage   Negative: Requesting regular office appointment   Negative: Health Information question, no triage required and triager able to answer question   Negative: General information question, no triage required and triager able to answer question   Negative: Question about upcoming scheduled test, no triage required and triager able to answer question   Negative: [1] Caller is not with the adult (patient) AND [2] probable NON-URGENT symptoms    Answer Assessment - Initial Assessment Questions  1. REASON FOR CALL or QUESTION: \"What is your reason for calling today?\" or \"How can I best help you?\" or \"What question do you have that I can help answer?\"      Boot ot large to stay on foot now with less swelling    Protocols used: Information Only Call - No Triage-ADULT-    "

## 2025-05-03 NOTE — PAYOR COMM NOTE
"[facility]      Carlos A Boyer Jr. (65 y.o. Male)       Date of Birth   1960    Social Security Number       Address   [address] [place] [address] [place] [address]    Home Phone   [phone]    MRN   [id number]       [facility]    Marital Status                               Admission Date   4/26/2025    Admission Type   Emergency    Admitting Provider   Angela Cesar MD    Attending Provider       Department, Room/Bed   [facility] 3A, 341/1       Discharge Date   5/2/2025    Discharge Disposition   Home or Self Care    Discharge Destination                                 Attending Provider: (none)   Allergies: Flagyl [Metronidazole], Atorvastatin, Ciprofloxacin    Isolation: None   Infection: COVID (History) (04/29/21)   Code Status: Prior    Ht: 182.9 cm (72\")   Wt: 108 kg (239 lb 1.6 oz)    Admission Cmt: None   Principal Problem: Recurrent seizures [G40.909]                   Active Insurance as of 4/26/2025       Primary Coverage       Payor Plan Insurance Group Employer/Plan Group    [id number]        Payor Plan Address Payor Plan Phone Number Payor Plan Fax Number Effective Dates    [employer] [facility] [phone]  6/1/2020 - None Entered    [address] [address]       [place] [address]         Subscriber Name Subscriber Birth Date Member ID       CARLOS A BOYER JR. 1960 [id number]               Secondary Coverage       Payor Plan Insurance Group Employer/Plan Group    [facility] CC[facility] [employer]        Payor Plan Address Payor Plan Phone Number Payor Plan Fax Number Effective Dates    [address] [phone]  6/1/2020 - None Entered    [facility] [id number]         Subscriber Name Subscriber Birth Date Member ID       CARLOS A BOYER JRGloria 1960 [id number]                     Emergency Contacts        (Rel.) Home Phone Work Phone Mobile Phone    ClaudioDaniela boyd (Spouse) [phone] "                 Discharge Summary        Angela Cesar MD at 05/02/25 1546                [facility]  DISCHARGE SUMMARY       Date of Admission: 4/26/2025  Date of Discharge:  5/2/2025  Primary Care Physician: Vinayak Correia PA    Presenting Problem/History of Present Illness:  Carlos A Boyer Jr.  Is a 65 y.o. male with pmh of seizure disorder, diabetes, HFpEF, carotid disease amongst other problems who presented with complaint of breakthrough seizures.    Final Discharge Diagnoses:  Active Hospital Problems    Diagnosis     **Recurrent seizures     Gas gangrene of foot     Retained orthopedic hardware     Chronic heart failure with preserved ejection fraction (HFpEF)     Cellulitis of right toe     Presence of CardioMEMS HF system     S/P CABG x 2     Chronic diastolic congestive heart failure with preserved ejection fraction     Atrial flutter     Class 1 obesity due to excess calories with serious comorbidity and body mass index (BMI) of 34.0 to 34.9 in adult     Diabetic peripheral neuropathy     Diabetic complication     Deviated nasal septum     Maribell bullosa     Benign non-nodular prostatic hyperplasia with lower urinary tract symptoms     Gastroesophageal reflux disease without esophagitis     Type 2 diabetes mellitus with circulatory disorder, with long-term current use of insulin        Consults: neurology, podiatry    Procedures Performed: partial right 2nd toe amputation - 4/30/25    Pertinent Test Results:   Results for orders placed during the hospital encounter of 04/17/23    Adult Transthoracic Echo Complete W/ Cont if Necessary Per Protocol    Interpretation Summary    Left ventricular systolic function is normal. Left ventricular ejection fraction appears to be 61 - 65%.    The following left ventricular wall segments are very mildly hypokinetic: apical inferior, apical septal and apex hypokinetic.    Left ventricular diastolic  function is consistent with (grade II w/high LAP) pseudonormalization.    Left atrial volume is mildly increased.    Estimated right ventricular systolic pressure from tricuspid regurgitation is normal (<35 mmHg).    Normal size and function of the right ventricle.    No significant valvular pathology.    No significant change compared to prior exam from 4/29/21.      Imaging Results (All)       Procedure Component Value Units Date/Time    XR Toe 2+ View Right [[id number]] Collected: 04/30/25 1607     Updated: 04/30/25 1613    Narrative:      XR TOE 2+ VW RIGHT- 4/30/2025 2:16 PM     HISTORY: post-op; G40.909-Epilepsy, unspecified, not intractable,  without status epilepticus; S31.000A-Unspecified open wound of lower  back and pelvis without penetration into retroperitoneum, initial  encounter; L03.031-Cellulitis of right toe; A48.0-Gas gangrene;  Z96.9-Presence of functional implant, unspecified; E11.59-Type 2  diabetes mellitus with other circulatory complications; Z79.4-Long term  (curre     COMPARISON: 4/27/2025     FINDINGS:  Frontal, lateral and oblique radiographs of the toes were provided for  review.     Hardware from arthrodesis of the IP joint of the great toe is been  removed. There is associated lucency and degenerative change at the  first MTP joint and IP joint of the great toe. Patient is status post  partial amputation of the second toe. There is mild osteopenia. No acute  fracture is seen. Postoperative changes are noted in the soft tissues.       Impression:         1. Removal of hardware at the great toe with associated lucency and  degenerative changes.  2. Partial amputation of the second toe.     This report was signed and finalized on 4/30/2025 4:10 PM by Hari Bonilla.       MRI Brain With & Without Contrast [[id number]] Collected: 04/28/25 0905     Updated: 04/28/25 0915    Narrative:      MRI BRAIN W WO CONTRAST- 4/28/2025 6:20 AM     HISTORY: recurrent breakthrough seizures;  G40.909-Epilepsy, unspecified,  not intractable, without status epilepticus     TECHNIQUE: Multisequence, multiplanar MRI of the brain before and after  the intravenous administration of Vueway contrast.     COMPARISON: Brain MRI dated 12/1/2017     FINDINGS:     No diffusion signal abnormality. No intra-axial or extra-axial  hemorrhage. No intracranial mass lesion or pathologic enhancement.  Moderate chronic small vessel ischemic change. Hippocampal volumes  appear symmetric. No obvious hippocampal sclerosis. Incidental tiny  hippocampal cysts. Posterior fossa structures are unremarkable.  Pituitary gland and sella are unremarkable. The major intracranial  flow-voids are preserved. Orbital contents are unremarkable. The  paranasal sinuses are clear. Partial right mastoid effusion. Normal  marrow signal in the skull base and calvarium.       Impression:         1.  No acute intracranial process.  2.  No intracranial mass lesion or pathologic enhancement.  3.  No obvious hippocampal sclerosis. Hippocampal volumes and signal  appear symmetric.  4.  Underlying moderate chronic small vessel ischemic change.     This report was signed and finalized on 4/28/2025 9:12 AM by Dr Merritt Reaves.       XR Foot 2 View Right [[id number]] Collected: 04/27/25 1135     Updated: 04/27/25 1145    Narrative:      XR FOOT 2 VW RIGHT-     HISTORY: right 2nd toe with wound and redness radiating up foot;  G40.909-Epilepsy, unspecified, not intractable, without status  epilepticus     COMPARISON: 7/27/2024     FINDINGS: 2 views of the right foot are obtained.     Surgical screw of the great toe from prior interphalangeal arthrodesis  does demonstrate hardware loosening with mild retraction of the screw  within the bony structure of the great toe. Advanced chronic arthritic  changes of the first MTP and interphalangeal joint of the great toe.     There is soft tissue swelling of the distal right second toe no  convincing radiographic  evidence of underlying osteomyelitis. There is  small soft tissue air/subcutaneous emphysema within the edematous distal  right second toe which may correspond to a region ulcerative defect.  Calcaneal spurring. Similar calcaneal osteotomy changes suspected. Mild  arthritic change midfoot       Impression:      1. Soft tissue swelling and mild subcutaneous emphysema involving the  soft tissues of the distal right second toe with no convincing  radiographic evidence of underlying osteomyelitis.  2. Hardware loosening involving the surgical screw of the great toe with  mild retraction of the surgical screw compared to 7/27/2024.     This report was signed and finalized on 4/27/2025 11:42 AM by Dr. Ml Estrada MD.       CT Head Without Contrast [[id number]] Collected: 04/26/25 1658     Updated: 04/26/25 1703    Narrative:      EXAMINATION: CT HEAD WO CONTRAST-  4/26/2025 4:58 PM     HISTORY: seizures     COMPARISON: 9/21/2023     DLP: 1069.19 mGy.cm     In order to have a CT radiation dose as low as reasonably achievable,  Automated Exposure Control was utilized for adjustment of the mA and/or  KV according to patient size.     TECHNIQUE: Serial axial tomographic images of the brain were obtained  without the use of intravenous contrast.  Coronal and sagittal  reformatted images were obtained reviewed as well.     FINDINGS:  No mass effect or midline shift. No acute hemorrhage. There is mild  low-attenuation in the subcortical and periventricular white matter as  can be seen with chronic microvascular change. Vascular calcifications  in the vertebral arteries in the intracranial portions of the carotid  arteries.     Surrounding soft tissues are without acute findings. Orbits and globes  are preserved. Paranasal sinus disease with an air-fluid level in the  RIGHT maxillary sinus suggesting mild acute sinusitis. Mild scattered  mucosal thickening in the ethmoid air cells. Small mastoid effusion on  the RIGHT,  unchanged from 2023 and likely chronic.          Impression:         1.  Mild atrophy and chronic white matter changes but no definite acute  intracranial process.     2.  Paranasal sinus disease as discussed above.     This report was signed and finalized on 4/26/2025 5:00 PM by Dr. Valentino Lebron MD.             LAB RESULTS:      Lab 05/02/25 0317 05/01/25 0242 04/30/25 0319 04/29/25  0841 04/28/25  0515 04/27/25 0409 04/26/25 1652 04/25/25  2341   WBC 5.12 5.36 3.91 3.35* 3.81 5.72 7.83 3.70   HEMOGLOBIN 13.0 13.4 13.2 13.5 13.7 15.2 14.5 14.8   HEMATOCRIT 40.7 40.8 39.8 41.0 40.6 46.9 43.9 44.1   PLATELETS 104* 91* 85* 79* 73* 73* 87* 105*   NEUTROS ABS  --   --   --   --   --  4.82 7.01* 3.21   IMMATURE GRANS (ABS)  --   --   --   --   --  0.03 0.03 0.01   LYMPHS ABS  --   --   --   --   --  0.51* 0.44* 0.32*   MONOS ABS  --   --   --   --   --  0.35 0.32 0.05*   EOS ABS  --   --   --   --   --  0.00 0.01 0.09   MCV 93.1 92.1 91.9 91.1 90.4 93.2 91.8 90.4         Lab 05/02/25 0317 05/01/25 0242 04/30/25 0319 04/29/25  0841 04/28/25  0515 04/27/25 0409 04/26/25 1652 04/25/25  2341   SODIUM 136 133* 138 136 136 136 136 137   POTASSIUM 4.2 4.1 4.6 4.2 4.0 4.9 4.5 5.0   CHLORIDE 104 105 104 103 104 99 100 102   CO2 21.0* 21.0* 22.0 21.0* 21.0* 23.0 23.0 20.0*   ANION GAP 11.0 7.0 12.0 12.0 11.0 14.0 13.0 15.0   BUN 19 16 21 19 20 15 15 18   CREATININE 0.89 0.87 0.87 0.90 0.93 1.02 1.10 1.10   EGFR 93.9 94.6 94.6 93.6 90.0 80.6 73.6 73.6   GLUCOSE 218* 205* 213* 201* 140* 177* 164* 209*   CALCIUM 8.7 8.5* 8.8 8.8 8.6 8.9 9.3 9.1   MAGNESIUM  --   --   --   --   --   --  1.8 1.8   PHOSPHORUS  --   --   --   --   --   --   --  2.4*   HEMOGLOBIN A1C  --   --   --   --   --  7.60*  --   --    TSH  --   --   --   --   --  0.640  --   --          Lab 04/28/25  0515 04/27/25  0409 04/26/25  1652 04/25/25  2341   TOTAL PROTEIN 6.2 7.0 7.1 7.1   ALBUMIN 3.4* 4.0 4.2 4.3   GLOBULIN 2.8 3.0 2.9 2.8   ALT (SGPT) 23  26 25 19   AST (SGOT) 27 42* 31 18   BILIRUBIN 0.9 2.0* 1.6* 1.2   ALK PHOS 59 63 64 90             Lab 04/27/25  0409   CHOLESTEROL 97   LDL CHOL 46   HDL CHOL 30*   TRIGLYCERIDES 115             Brief Urine Lab Results  (Last result in the past 365 days)        Color   Clarity   Blood   Leuk Est   Nitrite   Protein   CREAT   Urine HCG        04/26/25 0050 Yellow   Clear   Negative   Negative   Negative   100 mg/dL (2+)                 Microbiology Results (last 10 days)       Procedure Component Value - Date/Time    Blood Culture With STACY - Blood, Arm, Left [[id number]]  (Normal) Collected: 04/27/25 0850    Lab Status: Final result Specimen: Blood from Arm, Left Updated: 05/02/25 1000     Blood Culture No growth at 5 days    Blood Culture With STACY - Blood, Arm, Right [[id number]]  (Normal) Collected: 04/27/25 0850    Lab Status: Final result Specimen: Blood from Arm, Right Updated: 05/02/25 1015     Blood Culture No growth at 5 days    Respiratory Panel PCR w/COVID-19(SARS-CoV-2) MERLE/RAMESH/YOSI/PAD/COR/NGUYEN In-House, NP Swab in UTM/VTM, 2 HR TAT - Swab, Nasopharynx [[id number]]  (Normal) Collected: 04/26/25 0050    Lab Status: Final result Specimen: Swab from Nasopharynx Updated: 04/26/25 0145     ADENOVIRUS, PCR Not Detected     Coronavirus 229E Not Detected     Coronavirus HKU1 Not Detected     Coronavirus NL63 Not Detected     Coronavirus OC43 Not Detected     COVID19 Not Detected     Human Metapneumovirus Not Detected     Human Rhinovirus/Enterovirus Not Detected     Influenza A PCR Not Detected     Influenza B PCR Not Detected     Parainfluenza Virus 1 Not Detected     Parainfluenza Virus 2 Not Detected     Parainfluenza Virus 3 Not Detected     Parainfluenza Virus 4 Not Detected     RSV, PCR Not Detected     Bordetella pertussis pcr Not Detected     Bordetella parapertussis PCR Not Detected     Chlamydophila pneumoniae PCR Not Detected     Mycoplasma pneumo by PCR Not Detected    Narrative:      In the setting of a  positive respiratory panel with a viral infection PLUS a negative procalcitonin without other underlying concern for bacterial infection, consider observing off antibiotics or discontinuation of antibiotics and continue supportive care. If the respiratory panel is positive for atypical bacterial infection (Bordetella pertussis, Chlamydophila pneumoniae, or Mycoplasma pneumoniae), consider antibiotic de-escalation to target atypical bacterial infection.            Hospital Course:   Carlos A Boyer Jr.  Is a 65 y.o. male with pmh of seizure disorder, diabetes, HFpEF, carotid disease amongst other problems who presented with complaint of breakthrough seizures.Admitted 4/26 evaluated by neurology and podiatry.    # Recurrent seizures  Reported 5 seizures at home, adherence to meds  Previously, last breakthrough seizure was > 3 years ago  CT Head: Mild atrophy and chronic white matter changes but no definite acute intracranial process. Paranasal sinus disease with an air-fluid level in the right maxillary sinus suggesting mild acute sinusitis. Mild scattered mucosal thickening in the ethmoid air cells.  MRI brain: No acute findings.  - Continue levetiracetam and lamotrigine  - foot infection thought to have lowered   - He will need to follow up with neurology in 6-8 weeks       # Right Foot Infection s/p right second toe amputation 4/30/25  Tmax 101.5F on 4/26  Foot x-ray showing soft tissue swelling, mild emphysema and hardware loosening.    Blood cultures negative  - received 7 days of ceftriaxone  - pain management has been wall managed with tylenol  - Patient refusing home health due to large dog in his home.  He would like outpatient physical therapy instead.Referral sent.     # HFpEF s/p cardioMEMS 5/2023  # CAD s/p CABG  # Atrial flutter - on Eliquis   -Continue Imdur, statin, Lopressor, and Eliquis     # HOA - on CPAP     # Type II Diabetes with Neuropathy - HgbA1c 7.6%  - Continue Lantus,  Jardiance,  "gabapentin     # Class I Obesity - Body mass index is 32.42 kg/m².        Physical Exam on Discharge:  /65 (BP Location: Left arm, Patient Position: Sitting)   Pulse 58   Temp 97.8 °F (36.6 °C) (Oral)   Resp 16   Ht 182.9 cm (72\")   Wt 108 kg (239 lb 1.6 oz)   SpO2 94%   BMI 32.42 kg/m²   Physical Exam  GEN: Awake, alert, interactive, in NAD on room air   HEENT: Atraumatic, EOMI, Anicteric  Lungs: CTAB  Heart: RRR  ABD: soft, nt/nd, +BS, no guarding/rebound  Extremities: right foot in clean dressing and boot  Skin: no rashes or lesions  Neuro: AAOx3, no focal deficits  Psych: normal mood & affect    Condition on Discharge: Stable    Discharge Disposition:  Home or Self Care    Discharge Medications:     Discharge Medications        Continue These Medications        Instructions Start Date   acetaminophen 325 MG tablet  Commonly known as: TYLENOL   325 mg, Oral, Every 6 Hours PRN      apixaban 5 MG tablet tablet  Commonly known as: ELIQUIS   5 mg, 2 Times Daily      aspirin 81 MG EC tablet   81 mg, Daily      docusate sodium 100 MG capsule  Commonly known as: COLACE   100 mg, Oral, Daily PRN      empagliflozin 25 MG tablet tablet  Commonly known as: JARDIANCE   25 mg, Oral, Daily      Entresto  MG tablet  Generic drug: sacubitril-valsartan   1 tablet, 2 Times Daily      ezetimibe 10 MG tablet  Commonly known as: ZETIA   10 mg, Daily      fluticasone 50 MCG/ACT nasal spray  Commonly known as: FLONASE   2 sprays, Daily      furosemide 40 MG tablet  Commonly known as: Lasix   40 mg, Oral, Daily PRN      gabapentin 300 MG capsule  Commonly known as: NEURONTIN   600 mg, 2 Times Daily      guaiFENesin 600 MG 12 hr tablet  Commonly known as: MUCINEX   1,200 mg, Daily PRN      insulin aspart 100 UNIT/ML solution pen-injector sc pen  Commonly known as: novoLOG FLEXPEN   50 Units, Subcutaneous, 3 Times Daily With Meals, ADMINISTER 10 MINUTES BEFORE FOOD AS DIRECTED. REFRIGERATE UN-OPENED PENS. DISCARD " CARTRIDGE 28 DAYS AFTER OPENING.      insulin glargine 100 UNIT/ML injection  Commonly known as: LANTUS, SEMGLEE   30 Units, Daily      isosorbide mononitrate 120 MG 24 hr tablet  Commonly known as: IMDUR   120 mg, Oral, Daily      lamoTRIgine 200 MG tablet  Commonly known as: LaMICtal   200 mg, Oral, 2 Times Daily      levETIRAcetam 500 MG tablet  Commonly known as: KEPPRA   1,500 mg, Oral, Every Morning      levETIRAcetam 500 MG tablet  Commonly known as: KEPPRA   2,000 mg, Oral, Nightly      metFORMIN 1000 MG tablet  Commonly known as: GLUCOPHAGE   1,000 mg, 2 Times Daily With Meals      metoprolol tartrate 100 MG tablet  Commonly known as: LOPRESSOR   100 mg, 2 Times Daily      multivitamin tablet tablet  Commonly known as: THERAGRAN   1 tablet, Oral, Daily      nitroglycerin 0.4 MG SL tablet  Commonly known as: NITROSTAT   0.4 mg, Sublingual, Every 5 Minutes PRN, Take no more than 3 doses in 15 minutes.      pantoprazole 40 MG EC tablet  Commonly known as: PROTONIX   40 mg, 2 Times Daily      polycarbophil 625 MG tablet tablet   625 mg, Daily      polyethylene glycol 17 g packet  Commonly known as: MIRALAX   17 g, Daily      rimegepant sulfate ODT 75 MG disintegrating tablet  Commonly known as: Nurtec   75 mg, Sublingual, Once As Needed      rosuvastatin 40 MG tablet  Commonly known as: CRESTOR   20 mg, Nightly      tamsulosin 0.4 MG capsule 24 hr capsule  Commonly known as: FLOMAX   1 capsule, Daily      traMADol 50 MG tablet  Commonly known as: ULTRAM   50 mg, 4 Times Daily PRN               Discharge Diet: Diabetic and cardiac diet    Activity at Discharge: stable    Follow-up Appointments:   Future Appointments   Date Time Provider Department Center   5/6/2025 10:30 AM Cristel Driscoll APRN [facility] POD [facility]   10/8/2025 10:00 AM Komal Herzog APRN [facility] N [facility]   4/15/2026  1:00 PM Skye Olson APRN [facility] CTS  [facility]       Test Results Pending at Discharge: none    Electronically signed by Angela  MD Arsenio, 05/02/25, 15:46 CDT.    Time: 33 minutes.           Electronically signed by Angela Cesar MD at 05/03/25 0024

## 2025-05-05 NOTE — PROGRESS NOTES
[facility] - PODIATRY    Today's Date: 05/06/2025     Patient Name: Carlos A Boyer Jr.  MRN: [id number]  CSN: [id number]  PCP: Vinayak Correia PA  Referring Provider: No ref. provider found    SUBJECTIVE     Chief Complaint   Patient presents with    Follow-up     Vinayak Correia PA  1 wk post-op  Pt here for post-op follow-up. Pt states that he isn't having any pain or issues.    Diabetes     182 mg/dl BG     HPI: Carlos A Boyer Jr., a 65 y.o.male, comes to clinic as a(n) new patient for post-op appt 1 weeks s/p partial amputation of second toe and great toe hardware removal of the right foot . Patient has h/o arthritis, asthma, skin cancer, cellulitis, sinusitis, diabetes mellitus, coronary artery deficiencies, enlarged prostate, fatty liver, GERD, hyperlipidemia, atrial fibrillation, shortness of breath, stroke . Patient reports he has kept his dressing clean, dry, and intact.  He notes his swelling has decreased and his surgical shoe is too loose at this time.  Patient and wife report that he has a short CAM boot and a tall CAM boot that fit better than the surgical shoe.  Patient has continued to be partial weightbearing to heel only.  Patient is NIDDM with last stated BG level of 182mg/dl. Last A1c 7.6% Denies pain. Relates previous treatment(s) including podiatry care, right hallux arthrodesis . Denies any constitutional symptoms. No other pedal complaints at this time.    Past Medical History:   Diagnosis Date    Arthritis     Asthma     Cancer     skin    Carotid disease, bilateral     Cellulitis     right foot - on antibiotics 6-    Chest pain     Chronic diastolic congestive heart failure 09/07/2019    Chronic sinusitis     Maribell bullosa     Coronary artery disease involving native coronary artery of native heart with unstable angina pectoris 01/17/2017    Deviated septum     Diabetes mellitus     Difficulty urinating     Diverticulitis     Enlarged prostate     Fatty  liver     GERD (gastroesophageal reflux disease)     [place] (hard of hearing)     Does have hearing aids    Hyperlipidemia LDL goal <70 02/02/2017    Hypertrophy of nasal turbinates     Keratoderma     Kidney stone     Lung nodules     lower right lung    Migraine     Murmur, heart     Myocardial infarction     Obesity     Paroxysmal atrial fibrillation 07/11/2019    Personal history of COVID-19 07/2021    PONV (postoperative nausea and vomiting)     Primary hypertension 10/16/2016    Psoriasis     Seizures     Sinus congestion     Skin cancer     Sleep apnea     not using cpap    SOB (shortness of breath)     Stroke     mild weakness on right side    UTI (urinary tract infection)      Past Surgical History:   Procedure Laterality Date    AMPUTATION DIGIT Right 4/30/2025    Procedure: Partial vs Complete Amputation of 2nd Toe, Hardware Removal from Hallux - Right Foot;  Surgeon: Hernan Villa DPM;  Location: [facility] OR;  Service: Podiatry;  Laterality: Right;    CARDIAC CATHETERIZATION  01/2016    Dr. Broadbent; widely patent previously placed stents in the left anterior descending and obstructive disease involving the diagonal branch which was treated medically    CARDIAC CATHETERIZATION N/A 07/14/2017    Procedure: Left Heart Cath;  Surgeon: Wade Ramey MD;  Location:  [facility] CATH INVASIVE LOCATION;  Service:     CARDIAC CATHETERIZATION Left 10/15/2018    Procedure: Cardiac Catheterization/Vascular Study;  Surgeon: Wade Ramey MD;  Location:  [facility] CATH [facility];  Service: Cardiology    CARDIAC CATHETERIZATION  10/15/2018    Procedure: Functional Flow [place];  Surgeon: Wade Ramey MD;  Location:  [facility] CATH [facility]VAS[facility];  Service: Cardiology    CARDIAC CATHETERIZATION N/A 10/15/2018    Procedure: Left ventriculography;  Surgeon: Wade Ramey MD;  Location:  [facility] CATH [facility];  Service: Cardiology    CARDIAC CATHETERIZATION Left 06/26/2019    Procedure: Cardiac  Catheterization/Vascular Study VEL OK  HE WILL WAIT 1 YEAR FOR SHOULDER SURGERY ;  Surgeon: Wade Ramey MD;  Location:  [facility] CATH [facility];  Service: Cardiology    CARDIAC CATHETERIZATION Left 04/30/2021    Procedure: Coronary angiography;  Surgeon: Sahil Llamas MD;  Location:  [facility] CATH [facility];  Service: Cardiology;  Laterality: Left;    CARDIAC CATHETERIZATION N/A 04/30/2021    Procedure: Percutaneous Coronary Intervention;  Surgeon: Sahil Llamas MD;  Location:  [facility] C[facility];  Service: Cardiology;  Laterality: N/A;    CARDIAC CATHETERIZATION N/A 11/09/2022    Procedure: Left Heart Cath with SVGs;  Surgeon: Wade Ramey MD;  Location:  [facility] CATH [facility];  Service: Cardiology;  Laterality: N/A;    CARPAL TUNNEL RELEASE      January 2024 and pther hand February 2024    CHOLECYSTECTOMY      CHOLECYSTECTOMY WITH INTRAOPERATIVE CHOLANGIOGRAM N/A 08/01/2018    Procedure: CHOLECYSTECTOMY LAPAROSCOPIC INTRAOPERATIVE CHOLANGIOGRAM;  Surgeon: Shane Ann MD;  Location: [facility] OR;  Service: General    COLONOSCOPY N/A 07/14/2020    Procedure: COLONOSCOPY WITH ANESTHESIA;  Surgeon: Anupam Morales DO;  Location: [facility] ENDOSCOPY;  Service: Gastroenterology;  Laterality: N/A;  pre: abdominal pain  post: diverticulosis  Vinayak Correia PA    CORONARY ANGIOPLASTY      CORONARY ARTERY BYPASS GRAFT N/A 07/06/2019    Procedure: CABG X2 WITH LIMA, LEFT LEG OVH, AND PLACEMENT OF LEFT FEMORAL ARTERIAL LINE;  Surgeon: Steven Tang MD;  Location: [facility] OR;  Service: Cardiothoracic    CORONARY STENT PLACEMENT      x 6    CYSTOSCOPY TRANSURETHRAL RESECTION OF PROSTATE N/A 01/23/2023    Procedure: CYSTOSCOPY TRANSURETHRAL RESECTION OF PROSTATE;  Surgeon: Latrell Pope MD;  Location:  [facility] OR;  Service: Urology;  Laterality: N/A;    ENDOSCOPIC FUNCTIONAL SINUS SURGERY (FESS) Bilateral 12/13/2017    Procedure: PROCEDURE PERFORMED:  Bilateral functional  endoscopic anterior ethmoidectomy with bilateral middle meatal antrostomy Septoplasty Right kathia bullosa resection Bilateral inferior turbinate reduction via [place];  Surgeon: Mayank Ibarra MD;  Location: [facility] OR;  Service:     ENDOSCOPY N/A 07/30/2018    Procedure: ESOPHAGOGASTRODUODENOSCOPY WITH ANESTHESIA;  Surgeon: Benitez Mas MD;  Location: [facility] ENDOSCOPY;  Service: Gastroenterology    ENDOSCOPY N/A 07/14/2020    Procedure: ESOPHAGOGASTRODUODENOSCOPY WITH ANESTHESIA;  Surgeon: Anupam Morales DO;  Location: [facility] ENDOSCOPY;  Service: Gastroenterology;  Laterality: N/A;  pre: abdominal pain  post: esophagitis  Vinayak Correia, PA    HARDWARE REMOVAL Right 4/30/2025    Procedure: Hardware Removal from Hallux - Right Foot;  Surgeon: Hernan Villa DPM;  Location:  [facility] OR;  Service: Podiatry;  Laterality: Right;    HERNIA REPAIR      x2 inguinal area    KIDNEY STONE SURGERY      KNEE ARTHROSCOPY Right 03/01/2022    Procedure: RIGHT KNEE PARTIAL LATERAL MENISCECTOMY;  Surgeon: Pedro Pablo Song MD;  Location: [facility] OR;  Service: Orthopedics;  Laterality: Right;    KNEE SURGERY Right     OTHER SURGICAL HISTORY      urolift    PROSTATE SURGERY      Dr. Badillo - 2017    PULMONARY ARTERY PRESSURE SENSOR IMPLANT N/A 05/08/2023    Procedure: PA Pressure Sensor Implant;  Surgeon: Wade Ramey MD;  Location: [facility] CATH INVASIVE LOCATION;  Service: Cardiology;  Laterality: N/A;    ROTATOR CUFF REPAIR Right     SLEEP ENDOSCOPY N/A 02/27/2023    Procedure: Videosleep endoscopy;  Surgeon: Mayank Ibarra MD;  Location: [facility] OR;  Service: ENT;  Laterality: N/A;    THUMB AMPUTATION Left     partial    TOE FUSION Right 7/3/2024    Procedure: Hallux Interphalangeal Joint Arthrodesis, Bone Graft Cascade - Right Foot;  Surgeon: Hernan Villa DPM;  Location:  [facility] OR;  Service: Podiatry;  Laterality: Right;    TOE SURGERY      great toe     Family History   Problem Relation Age of  Onset    Heart disease Father     COPD Mother     Hypertension Mother     Asthma Mother     No Known Problems Sister     Colon cancer Paternal Uncle     Prostate cancer Maternal Grandfather     No Known Problems Sister     Colon cancer Maternal Grandmother     Colon polyps Maternal Grandmother      Social History     Socioeconomic History    Marital status:    Tobacco Use    Smoking status: Former     Current packs/day: 0.00     Average packs/day: 1.5 packs/day for 18.0 years (27.0 ttl pk-yrs)     Types: Cigarettes, Cigars     Start date:      Quit date:      Years since quittin.3     Passive exposure: Past    Smokeless tobacco: Former     Types: Chew     Quit date:    Vaping Use    Vaping status: Never Used   Substance and Sexual Activity    Alcohol use: No     Comment: 0    Drug use: No    Sexual activity: Defer     Allergies   Allergen Reactions    Flagyl [Metronidazole] Hives    Atorvastatin Other (See Comments) and Myalgia      - LIPITOR -   Muscle cramps    Ciprofloxacin Hives      - CIPRO -      Current Outpatient Medications   Medication Sig Dispense Refill    acetaminophen (TYLENOL) 325 MG tablet Take 1 tablet by mouth Every 6 (Six) Hours As Needed for Mild Pain.      apixaban (ELIQUIS) 5 MG tablet tablet Take 1 tablet by mouth 2 (Two) Times a Day.      aspirin 81 MG EC tablet Take 1 tablet by mouth Daily.      calcium polycarbophil (FIBERCON) 625 MG tablet Take 1 tablet by mouth Daily.      docusate sodium (COLACE) 100 MG capsule Take 1 capsule by mouth Daily As Needed for Constipation.      empagliflozin (JARDIANCE) 25 MG tablet tablet Take 1 tablet by mouth Daily.      ezetimibe (ZETIA) 10 MG tablet Take 1 tablet by mouth Daily.      fluticasone (FLONASE) 50 MCG/ACT nasal spray Administer 2 sprays into the nostril(s) as directed by provider Daily.      furosemide (Lasix) 40 MG tablet Take 1 tablet by mouth Daily As Needed (take as needed for weight gain more than 2 pounds in a day  or more than than 3 pounds in 2 days.). 90 tablet 3    gabapentin (NEURONTIN) 300 MG capsule Take 2 capsules by mouth 2 (Two) Times a Day.      guaiFENesin (MUCINEX) 600 MG 12 hr tablet Take 2 tablets by mouth Daily As Needed for Congestion.      insulin aspart (novoLOG FLEXPEN) 100 UNIT/ML solution pen-injector sc pen Inject 50 Units under the skin into the appropriate area as directed 3 (Three) Times a Day With Meals. ADMINISTER 10 MINUTES BEFORE FOOD AS DIRECTED. REFRIGERATE UN-OPENED PENS. DISCARD CARTRIDGE 28 DAYS AFTER OPENING.      insulin glargine (LANTUS, SEMGLEE) 100 UNIT/ML injection Inject 30 Units under the skin into the appropriate area as directed Daily.      isosorbide mononitrate (IMDUR) 120 MG 24 hr tablet Take 1 tablet by mouth Daily. 90 tablet 3    lamoTRIgine (LaMICtal) 200 MG tablet Take 1 tablet by mouth 2 (Two) Times a Day. 180 tablet 3    levETIRAcetam (KEPPRA) 500 MG tablet Take 3 tablets by mouth Every Morning. 270 tablet 3    levETIRAcetam (KEPPRA) 500 MG tablet Take 4 tablets by mouth Every Night. 360 tablet 3    metFORMIN (GLUCOPHAGE) 1000 MG tablet Take 1 tablet by mouth 2 (Two) Times a Day With Meals.      metoprolol tartrate (LOPRESSOR) 100 MG tablet Take 1 tablet by mouth 2 (Two) Times a Day.      Multiple Vitamin (MULTI VITAMIN PO) Take 1 tablet by mouth Daily.      nitroglycerin (NITROSTAT) 0.4 MG SL tablet Place 1 tablet under the tongue Every 5 (Five) Minutes As Needed for Chest Pain. Take no more than 3 doses in 15 minutes. 25 tablet 2    pantoprazole (PROTONIX) 40 MG EC tablet Take 1 tablet by mouth 2 (Two) Times a Day.      polyethylene glycol (MIRALAX) 17 g packet Take 17 g by mouth Daily.      rimegepant sulfate ODT (Nurtec) 75 MG disintegrating tablet Place 1 tablet under the tongue 1 (One) Time As Needed (at onset) for up to 180 days. 8 tablet 5    rosuvastatin (CRESTOR) 40 MG tablet Take 0.5 tablets by mouth Every Night.      sacubitril-valsartan (Entresto)  MG  tablet Take 1 tablet by mouth 2 (Two) Times a Day.      tamsulosin (FLOMAX) 0.4 MG capsule 24 hr capsule Take 1 capsule by mouth Daily.      traMADol (ULTRAM) 50 MG tablet Take 1 tablet by mouth 4 (Four) Times a Day As Needed for Moderate Pain or Severe Pain (pain not relieved by Gabapentin).       No current facility-administered medications for this visit.     Review of Systems   Constitutional:  Negative for activity change.   HENT:  Negative for congestion.    Respiratory:  Negative for apnea and shortness of breath.    Gastrointestinal:  Negative for abdominal pain.   Musculoskeletal:  Positive for gait problem. Negative for arthralgias and back pain.   Neurological:  Positive for weakness and numbness.   Psychiatric/Behavioral:  Negative for agitation, behavioral problems and confusion.        OBJECTIVE     Vitals:    05/06/25 1103   BP: 132/86   Pulse: 63   SpO2: 96%       PHYSICAL EXAM  GEN:   Accompanied by wife.     Foot/Ankle Exam    GENERAL  Appearance:  appears stated age  Orientation:  AAOx3  Affect:  appropriate  Gait:  partial weight bearing  Assistance:  wheelchair  Right shoe gear: surgical shoe  Left shoe gear: casual shoe    VASCULAR     Right Foot Vascularity   Dorsalis pedis:  2+  Posterior tibial:  2+  Skin temperature:  warm  Edema grading:  None  CFT:  3  Pedal hair growth:  Present  Varicosities:  none     Left Foot Vascularity   Dorsalis pedis:  2+  Posterior tibial:  2+  Skin temperature:  warm  Edema grading:  None  CFT:  3  Pedal hair growth:  Present  Varicosities:  none     NEUROLOGIC     Right Foot Neurologic   Light touch sensation: diminished  Vibratory sensation: diminished  Hot/Cold sensation: diminished     Left Foot Neurologic   Light touch sensation: diminished  Vibratory sensation: diminished  Hot/Cold sensation:  diminished    MUSCULOSKELETAL     Right Foot Musculoskeletal   Ecchymosis:  none  Tenderness:  (Mildly to incision sites)  Arch:  Normal     Left Foot  Musculoskeletal   Ecchymosis:  none  Tenderness:  none  Arch:  Normal    MUSCLE STRENGTH     Right Foot Muscle Strength   Foot dorsiflexion:  5  Foot plantar flexion:  5  Foot inversion:  5  Foot eversion:  5     Left Foot Muscle Strength   Foot dorsiflexion:  5  Foot plantar flexion:  5  Foot inversion:  5  Foot eversion:  5    RANGE OF MOTION     Right Foot Range of Motion   Foot and ankle ROM within normal limits       Left Foot Range of Motion   Foot and ankle ROM within normal limits      DERMATOLOGIC      Right Foot Dermatologic   Skin  Right foot skin is intact.      Left Foot Dermatologic   Skin  Left foot skin is intact.      Right foot additional comments: Surgical intervention today.  Sutures intact without any signs of infection or drainage.      RADIOLOGY/NUCLEAR:  XR Toe 2+ View Right  Result Date: 4/30/2025  Narrative: XR TOE 2+ VW RIGHT- 4/30/2025 2:16 PM  HISTORY: post-op; G40.909-Epilepsy, unspecified, not intractable, without status epilepticus; S31.000A-Unspecified open wound of lower back and pelvis without penetration into retroperitoneum, initial encounter; L03.031-Cellulitis of right toe; A48.0-Gas gangrene; Z96.9-Presence of functional implant, unspecified; E11.59-Type 2 diabetes mellitus with other circulatory complications; Z79.4-Long term (curre  COMPARISON: 4/27/2025  FINDINGS: Frontal, lateral and oblique radiographs of the toes were provided for review.  Hardware from arthrodesis of the IP joint of the great toe is been removed. There is associated lucency and degenerative change at the first MTP joint and IP joint of the great toe. Patient is status post partial amputation of the second toe. There is mild osteopenia. No acute fracture is seen. Postoperative changes are noted in the soft tissues.      Impression:  1. Removal of hardware at the great toe with associated lucency and degenerative changes. 2. Partial amputation of the second toe.  This report was signed and finalized on  4/30/2025 4:10 PM by Hari Bonilla.      MRI Brain With & Without Contrast  Result Date: 4/28/2025  Narrative: MRI BRAIN W WO CONTRAST- 4/28/2025 6:20 AM  HISTORY: recurrent breakthrough seizures; G40.909-Epilepsy, unspecified, not intractable, without status epilepticus  TECHNIQUE: Multisequence, multiplanar MRI of the brain before and after the intravenous administration of Vueway contrast.  COMPARISON: Brain MRI dated 12/1/2017  FINDINGS:  No diffusion signal abnormality. No intra-axial or extra-axial hemorrhage. No intracranial mass lesion or pathologic enhancement. Moderate chronic small vessel ischemic change. Hippocampal volumes appear symmetric. No obvious hippocampal sclerosis. Incidental tiny hippocampal cysts. Posterior fossa structures are unremarkable. Pituitary gland and sella are unremarkable. The major intracranial flow-voids are preserved. Orbital contents are unremarkable. The paranasal sinuses are clear. Partial right mastoid effusion. Normal marrow signal in the skull base and calvarium.      Impression:  1.  No acute intracranial process. 2.  No intracranial mass lesion or pathologic enhancement. 3.  No obvious hippocampal sclerosis. Hippocampal volumes and signal appear symmetric. 4.  Underlying moderate chronic small vessel ischemic change.  This report was signed and finalized on 4/28/2025 9:12 AM by Dr Merritt Reaves.      EEG  Result Date: 4/27/2025  Narrative: Reason for referral: 65 y.o.male with seizure Technical Summary:  A 19 channel digital EEG was performed using the international 10-20 placement system, including eye leads and EKG leads. Duration: 22 minutes Findings: The patient is asleep at the beginning of the study.  The background shows diffuse low amplitude theta present symmetrically over both hemispheres.  Toward the end of the study when the patient awakened there is an increase in faster theta and alpha frequencies.  A clear posterior rhythm is not seen.  Photic  stimulation does not change the background.  Hyperventilation is not performed.  No focal features or epileptiform activity are present. Video: Available Technical quality: Good Rhythm strip: Regular, 60 bpm SUMMARY: Excessive drowsiness Otherwise unremarkable EEG in the mostly asleep and briefly awake states No focal features or epileptiform activity are seen     Impression: Normal study This report is transcribed using the Dragon dictation system.      XR Foot 2 View Right  Result Date: 4/27/2025  Narrative: XR FOOT 2 VW RIGHT-  HISTORY: right 2nd toe with wound and redness radiating up foot; G40.909-Epilepsy, unspecified, not intractable, without status epilepticus  COMPARISON: 7/27/2024  FINDINGS: 2 views of the right foot are obtained.  Surgical screw of the great toe from prior interphalangeal arthrodesis does demonstrate hardware loosening with mild retraction of the screw within the bony structure of the great toe. Advanced chronic arthritic changes of the first MTP and interphalangeal joint of the great toe.  There is soft tissue swelling of the distal right second toe no convincing radiographic evidence of underlying osteomyelitis. There is small soft tissue air/subcutaneous emphysema within the edematous distal right second toe which may correspond to a region ulcerative defect. Calcaneal spurring. Similar calcaneal osteotomy changes suspected. Mild arthritic change midfoot      Impression: 1. Soft tissue swelling and mild subcutaneous emphysema involving the soft tissues of the distal right second toe with no convincing radiographic evidence of underlying osteomyelitis. 2. Hardware loosening involving the surgical screw of the great toe with mild retraction of the surgical screw compared to 7/27/2024.  This report was signed and finalized on 4/27/2025 11:42 AM by Dr. Ml Estrada MD.      CT Head Without Contrast  Result Date: 4/26/2025  Narrative: EXAMINATION: CT HEAD WO CONTRAST-  4/26/2025 4:58 PM   HISTORY: seizures  COMPARISON: 9/21/2023  DLP: 1069.19 mGy.cm  In order to have a CT radiation dose as low as reasonably achievable, Automated Exposure Control was utilized for adjustment of the mA and/or KV according to patient size.  TECHNIQUE: Serial axial tomographic images of the brain were obtained without the use of intravenous contrast.  Coronal and sagittal reformatted images were obtained reviewed as well.  FINDINGS: No mass effect or midline shift. No acute hemorrhage. There is mild low-attenuation in the subcortical and periventricular white matter as can be seen with chronic microvascular change. Vascular calcifications in the vertebral arteries in the intracranial portions of the carotid arteries.  Surrounding soft tissues are without acute findings. Orbits and globes are preserved. Paranasal sinus disease with an air-fluid level in the RIGHT maxillary sinus suggesting mild acute sinusitis. Mild scattered mucosal thickening in the ethmoid air cells. Small mastoid effusion on the RIGHT, unchanged from 2023 and likely chronic.       Impression:  1.  Mild atrophy and chronic white matter changes but no definite acute intracranial process.  2.  Paranasal sinus disease as discussed above.  This report was signed and finalized on 4/26/2025 5:00 PM by Dr. Valentino Lebron MD.      XR Chest 1 View  Result Date: 4/26/2025  Narrative: XR CHEST 1 VW-  HISTORY: rule out pneumonia; R56.9-Unspecified convulsions  COMPARISON: 12/26/2024  FINDINGS: Frontal view of the chest obtained.  Stable cardiomegaly with evidence of prior mediastinal surgery. Chronic mild prominence of pulmonary vascular structures with no acute consolidation. No pleural effusion or pneumothorax. No acute regional bony pathology.      Impression: 1. Prior mediastinal surgery with stable cardiomegaly and mild chronic change. No acute abnormality.   This report was signed and finalized on 4/26/2025 10:14 AM by Dr. Ml Estrada MD.      CT  Angiogram Chest  Result Date: 4/9/2025  Narrative: EXAMINATION: CT ANGIOGRAM CHEST-   4/9/2025 8:42 AM  HISTORY: Ascending Aortic Ansurysm; I71.21-Aneurysm of the ascending aorta, without rupture  In order to have a CT radiation dose as low as reasonably achievable Automated Exposure Control was utilized for adjustment of the mA and/or KV according to patient size.  CT Dose DLP = 420.13 mGy.cm. (If there are multiple studies performed at the same time this represents the total dose).  Images are stored in PACS per institutional protocol.  CT angiogram chest with IV contrast injection. CT angiography protocol. CT imaging with bolus IV contrast injection. Under concurrent supervision axial, sagittal, coronal, three-dimensional, and MIP data sets were constructed on an independent work station.  Comparison: 2/14/2024.  Heart size is at the upper limits of normal. Median sternotomy changes and coronary artery calcification. Symmetric and normally opacified pulmonary arteries. Unchanged 14 mm linear metal structure within the LEFT pulmonary artery. No mediastinal mass. Normal CT appearance of the thyroid gland.  Stable dilated ascending thoracic aorta measuring 41 x 45 mm on axial image 66. Stable 34 mm aortic root measurement on coronal image 53. Stable 38 mm diameter of the distal ascending aorta near the innominate artery origin. No dissection.  Moderate degenerative disc and endplate change throughout the thoracic spine. Multiple (approximately 10) tiny scattered lung nodules are unchanged.      Impression: 1. Stable size dilated ascending thoracic aorta. 2. No significant change from last year.  This report was signed and finalized on 4/9/2025 10:32 AM by Dr. Celso Wynn MD.        LABORATORY/CULTURE RESULTS:      PATHOLOGY RESULTS:       ASSESSMENT/PLAN     Diagnoses and all orders for this visit:    1. 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 Status post hardware  removal      Comprehensive lower extremity examination and evaluation was performed.  Discussed findings and treatment plan including risks, benefits, and treatment options with patient in detail. Patient agreed with treatment plan.  Surgical dressing removed today.    Sutures in place.  Similar dressing applied.  No signs of infection or drainage.  Patient will continue to keep dressing clean, dry, and intact until next appointment.   Patient may continue partial heel touch weightbearing only in CAM boot or surgical shoe.  Patient will return in 2 weeks for suture removal.   Continue to elevate the foot.  Patient may use ice behind the knee for pain control.   Encouraged patient and wife to contact the office with any questions or concerns before next appointment.     An After Visit Summary was printed and given to the patient at discharge, including (if requested) any available informative/educational handouts regarding diagnosis, treatment, or medications. All questions were answered to patient/family satisfaction. Should symptoms fail to improve or worsen they agree to call or return to clinic or to go to the Emergency Department. Discussed the importance of following up with any needed screening tests/labs/specialist appointments and any requested follow-up recommended by me today. Importance of maintaining follow-up discussed and patient accepts that missed appointments can delay diagnosis and potentially lead to worsening of conditions.  Return in about 2 weeks (around 5/20/2025) for With Cristel DICKINSON, Post-op appointment., or sooner if acute issues arise.      This document has been electronically signed by TEENA Nice on May 6, 2025 12:01 CDT

## 2025-05-06 ENCOUNTER — OFFICE VISIT (OUTPATIENT)
Age: 65
End: 2025-05-06
Payer: MEDICARE

## 2025-05-06 VITALS
OXYGEN SATURATION: 96 % | HEIGHT: 72 IN | HEART RATE: 63 BPM | WEIGHT: 239 LBS | BODY MASS INDEX: 32.37 KG/M2 | SYSTOLIC BLOOD PRESSURE: 132 MMHG | DIASTOLIC BLOOD PRESSURE: 86 MMHG

## 2025-05-06 DIAGNOSIS — Z89.421 STATUS POST AMPUTATION OF LESSER TOE OF RIGHT FOOT: Primary | ICD-10-CM

## 2025-05-06 DIAGNOSIS — Z98.890 STATUS POST HARDWARE REMOVAL: ICD-10-CM

## 2025-05-06 PROCEDURE — 99024 POSTOP FOLLOW-UP VISIT: CPT | Performed by: NURSE PRACTITIONER

## 2025-05-07 ENCOUNTER — READMISSION MANAGEMENT (OUTPATIENT)
Dept: CALL CENTER | Facility: HOSPITAL | Age: 65
End: 2025-05-07
Payer: OTHER GOVERNMENT

## 2025-05-07 NOTE — OUTREACH NOTE
Medical Week 1 Survey      Flowsheet Row Responses   [facility] patient discharged from? [facility]   Does the patient have one of the following disease processes/diagnoses(primary or secondary)? Other   Week 1 attempt successful? Yes   Call start time 1342   Call end time 1344   Discharge diagnosis Breakthrough seizure   Meds reviewed with patient/caregiver? Yes   Is the patient having any side effects they believe may be caused by any medication additions or changes? No   Does the patient have all medications ordered at discharge? N/A   Is the patient taking all medications as directed (includes completed medication regime)? Yes   Comments regarding appointments Post op apt on 5/19/25   Does the patient have a primary care provider?  Yes   Has the patient kept scheduled appointments due by today? Yes  [Podiatry apt on 5/6/25]   Has home health visited the patient within 72 hours of discharge? N/A   Psychosocial issues? No   Did the patient receive a copy of their discharge instructions? Yes   Nursing interventions Reviewed instructions with patient   What is the patient's perception of their health status since discharge? Improving   Is the patient/caregiver able to teach back signs and symptoms related to disease process for when to call PCP? Yes   Is the patient/caregiver able to teach back signs and symptoms related to disease process for when to call 911? Yes   Is the patient/caregiver able to teach back the hierarchy of who to call/visit for symptoms/problems? PCP, Specialist, Home health nurse, Urgent Care, ED, 911 Yes   If the patient is a current smoker, are they able to teach back resources for cessation? Not a smoker   Week 1 call completed? Yes   Graduated Yes   Did the patient feel the follow up calls were helpful during their recovery period? Yes   Graduated/Revoked comments pt recovering   Call end time 1344            Rosalia H - Registered Nurse

## 2025-05-15 NOTE — PROGRESS NOTES
[facility] - POD[facility]ATRY    Today's Date: 05/19/2025     Patient Name: Carlos A Boyer Jr.  MRN: [id number]  [facility]: [id number]  PCP: Vinayak Correia PA  Referring Provider: Vinayak Correia PA    SUBJECTIVE     Chief Complaint   Patient presents with    Follow-up     PCP: Vinayak Correia PA  2 weeks for Post-op appointment.   Suture removal XRAYS 04/30/25   Pt here for follow up. Pt reports no pain or issues.    Diabetes     167 mg/dl BG     HPI: Carlos A Boyer Jr., a 65 y.o.male, comes to clinic as a(n) established patient for post-op appt 3 weeks s/p partial amputation of second toe and great toe hardware removal of the right foot . Patient has h/o arthritis, asthma, skin cancer, cellulitis, sinusitis, diabetes mellitus, coronary artery deficiencies, enlarged prostate, fatty liver, GERD, hyperlipidemia, atrial fibrillation, shortness of breath, stroke . Patient reports he has kept his dressing clean, dry, and intact.  He notes his swelling has decreased and his surgical shoe is too loose at this time.  Patient reports cracking and callused areas to the right foot due to not being able to wash foot and moisturize like he usually does.    Patient is NIDDM with last stated BG level of 167mg/dl. Last A1c 7.6% Denies pain. Relates previous treatment(s) including podiatry care, right hallux arthrodesis . Denies any constitutional symptoms. No other pedal complaints at this time.    Past Medical History:   Diagnosis Date    Arthritis     Asthma     Cancer     skin    Carotid disease, bilateral     Cellulitis     right foot - on antibiotics 6-    Chest pain     Chronic diastolic congestive heart failure 09/07/2019    Chronic sinusitis     Marib[place] bullosa     Coronary artery disease involving native coronary artery of native heart with unstable angina pectoris 01/17/2017    Deviated septum     Diabetes mellitus     Difficulty urinating     Diverticulitis     Enlarged prostate     Fatty  liver     GERD (gastroesophageal reflux disease)     [place] (hard of hearing)     Does have hearing aids    Hyperlipidemia LDL goal <70 02/02/2017    Hypertrophy of nasal turbinates     Keratoderma     Kidney stone     Lung nodules     lower right lung    Migraine     Murmur, heart     Myocardial infarction     Obesity     Paroxysmal atrial fibrillation 07/11/2019    Personal history of COVID-19 07/2021    PONV (postoperative nausea and vomiting)     Primary hypertension 10/16/2016    Psoriasis     Seizures     Sinus congestion     Skin cancer     Sleep apnea     not using cpap    SOB (shortness of breath)     Stroke     mild weakness on right side    UTI (urinary tract infection)      Past Surgical History:   Procedure Laterality Date    AMPUTATION DIGIT Right 4/30/2025    Procedure: Partial vs Complete Amputation of 2nd Toe, Hardware Removal from Hallux - Right Foot;  Surgeon: Hernan Villa DPM;  Location: [facility] OR;  Service: Podiatry;  Laterality: Right;    CARDIAC CATHETERIZATION  01/2016    Dr. Broadbent; widely patent previously placed stents in the left anterior descending and obstructive disease involving the diagonal branch which was treated medically    CARDIAC CATHETERIZATION N/A 07/14/2017    Procedure: Left Heart Cath;  Surgeon: Wade Ramey MD;  Location:  [facility] CATH INVASIVE LOCATION;  Service:     CARDIAC CATHETERIZATION Left 10/15/2018    Procedure: Cardiac Catheterization/Vascular Study;  Surgeon: Wade Ramey MD;  Location:  [facility] C[facility];  Service: Cardiology    CARDIAC CATHETERIZATION  10/15/2018    Procedure: Functional Flow [place];  Surgeon: Wade Ramey MD;  Location:  [facility];  Service: Cardiology    CARDIAC CATHETERIZATION N/A 10/15/2018    Procedure: Left ventriculography;  Surgeon: Wade Ramey MD;  Location:  [facility];  Service: Cardiology    CARDIAC CATHETERIZATION Left 06/26/2019    Procedure: Cardiac  Catheterization/Vascular Study VEL OK  HE WILL WAIT 1 YEAR FOR SHOULDER SURGERY ;  Surgeon: Wade Ramey MD;  Location:  [facility];  Service: Cardiology    CARDIAC CATHETERIZATION Left 04/30/2021    Procedure: Coronary angiography;  Surgeon: Sahil Llamas MD;  Location:  [facility];  Service: Cardiology;  Laterality: Left;    CARDIAC CATHETERIZATION N/A 04/30/2021    Procedure: Percutaneous Coronary Intervention;  Surgeon: Sahil Llamas MD;  Location:  [facility];  Service: Cardiology;  Laterality: N/A;    CARDIAC CATHETERIZATION N/A 11/09/2022    Procedure: Left Heart Cath with SVGs;  Surgeon: Wade Ramey MD;  Location:  [facility];  Service: Cardiology;  Laterality: N/A;    CARPAL TUNNEL RELEASE      January 2024 and pther hand February 2024    CHOLECYSTECTOMY      CHOLECYSTECTOMY WITH INTRAOPERATIVE CHOLANGIOGRAM N/A 08/01/2018    Procedure: CHOLECYSTECTOMY LAPAROSCOPIC INTRAOPERATIVE CHOLANGIOGRAM;  Surgeon: Shane Ann MD;  Location: [facility] OR;  Service: General    COLONOSCOPY N/A 07/14/2020    Procedure: COLONOSCOPY WITH ANESTHESIA;  Surgeon: Anupam Morales DO;  Location: [facility] ENDOSCOPY;  Service: Gastroenterology;  Laterality: N/A;  pre: abdominal pain  post: diverticulosis  Vinayak Correia PA    CORONARY ANGIOPLASTY      CORONARY ARTERY BYPASS GRAFT N/A 07/06/2019    Procedure: CABG X2 WITH LIMA, LEFT LEG OVH, AND PLACEMENT OF LEFT FEMORAL ARTERIAL LINE;  Surgeon: Steven Tang MD;  Location: [facility] OR;  Service: Cardiothoracic    CORONARY STENT PLACEMENT      x 6    CYSTOSCOPY TRANSURETHRAL RESECTION OF PROSTATE N/A 01/23/2023    Procedure: CYSTOSCOPY TRANSURETHRAL RESECTION OF PROSTATE;  Surgeon: Latrell Pope MD;  Location:  [facility] OR;  Service: Urology;  Laterality: N/A;    ENDOSCOPIC FUNCTIONAL SINUS SURGERY (FESS) Bilateral 12/13/2017    Procedure: PROCEDURE PERFORMED:  Bilateral functional  endoscopic anterior ethmoidectomy with bilateral middle meatal antrostomy Septoplasty Right kathia bullosa resection Bilateral inferior turbinate reduction via [place];  Surgeon: Mayank Ibarra MD;  Location: [facility] OR;  Service:     ENDOSCOPY N/A 07/30/2018    Procedure: ESOPHAGOGASTRODUODENOSCOPY WITH ANESTHESIA;  Surgeon: Benitez Mas MD;  Location: [facility] ENDOSCOPY;  Service: Gastroenterology    ENDOSCOPY N/A 07/14/2020    Procedure: ESOPHAGOGASTRODUODENOSCOPY WITH ANESTHESIA;  Surgeon: Anupam Morales DO;  Location: [facility] ENDOSCOPY;  Service: Gastroenterology;  Laterality: N/A;  pre: abdominal pain  post: esophagitis  Vinayak Correia, PA    HARDWARE REMOVAL Right 4/30/2025    Procedure: Hardware Removal from Hallux - Right Foot;  Surgeon: Hernan Villa DPM;  Location:  [facility] OR;  Service: Podiatry;  Laterality: Right;    HERNIA REPAIR      x2 inguinal area    KIDNEY STONE SURGERY      KNEE ARTHROSCOPY Right 03/01/2022    Procedure: RIGHT KNEE PARTIAL LATERAL MENISCECTOMY;  Surgeon: Pedro Pablo Song MD;  Location: [facility] OR;  Service: Orthopedics;  Laterality: Right;    KNEE SURGERY Right     OTHER SURGICAL HISTORY      urolift    PROSTATE SURGERY      Dr. Badillo - 2017    PULMONARY ARTERY PRESSURE SENSOR IMPLANT N/A 05/08/2023    Procedure: PA Pressure Sensor Implant;  Surgeon: Wade Ramey MD;  Location: [facility] CATH INVASIVE LOCATION;  Service: Cardiology;  Laterality: N/A;    ROTATOR CUFF REPAIR Right     SLEEP ENDOSCOPY N/A 02/27/2023    Procedure: Videosleep endoscopy;  Surgeon: Mayank Ibarra MD;  Location: [facility] OR;  Service: ENT;  Laterality: N/A;    THUMB AMPUTATION Left     partial    TOE FUSION Right 7/3/2024    Procedure: Hallux Interphalangeal Joint Arthrodesis, Bone Graft [place] - Right Foot;  Surgeon: Hernan Villa DPM;  Location:  [facility] OR;  Service: Podiatry;  Laterality: Right;    TOE SURGERY      great toe     Family History   Problem Relation Age of  Onset    Heart disease Father     COPD Mother     Hypertension Mother     Asthma Mother     No Known Problems Sister     Colon cancer Paternal Uncle     Prostate cancer Maternal Grandfather     No Known Problems Sister     Colon cancer Maternal Grandmother     Colon polyps Maternal Grandmother      Social History     Socioeconomic History    Marital status:    Tobacco Use    Smoking status: Former     Current packs/day: 0.00     Average packs/day: 1.5 packs/day for 18.0 years (27.0 ttl pk-yrs)     Types: Cigarettes, Cigars     Start date:      Quit date:      Years since quittin.4     Passive exposure: Past    Smokeless tobacco: Former     Types: Chew     Quit date:    Vaping Use    Vaping status: Never Used   Substance and Sexual Activity    Alcohol use: No     Comment: 0    Drug use: No    Sexual activity: Defer     Allergies   Allergen Reactions    Flagyl [Metronidazole] Hives    Atorvastatin Other (See Comments) and Myalgia      - LIPITOR -   Muscle cramps    Ciprofloxacin Hives      - CIPRO -      Current Outpatient Medications   Medication Sig Dispense Refill    acetaminophen (TYLENOL) 325 MG tablet Take 1 tablet by mouth Every 6 (Six) Hours As Needed for Mild Pain.      apixaban (ELIQUIS) 5 MG tablet tablet Take 1 tablet by mouth 2 (Two) Times a Day.      aspirin 81 MG EC tablet Take 1 tablet by mouth Daily.      calcium polycarbophil (FIBERCON) 625 MG tablet Take 1 tablet by mouth Daily.      docusate sodium (COLACE) 100 MG capsule Take 1 capsule by mouth Daily As Needed for Constipation.      empagliflozin (JARDIANCE) 25 MG tablet tablet Take 1 tablet by mouth Daily.      ezetimibe (ZETIA) 10 MG tablet Take 1 tablet by mouth Daily.      fluticasone (FLONASE) 50 MCG/ACT nasal spray Administer 2 sprays into the nostril(s) as directed by provider Daily.      furosemide (Lasix) 40 MG tablet Take 1 tablet by mouth Daily As Needed (take as needed for weight gain more than 2 pounds in a day  or more than than 3 pounds in 2 days.). 90 tablet 3    gabapentin (NEURONTIN) 300 MG capsule Take 2 capsules by mouth 2 (Two) Times a Day.      guaiFENesin (MUCINEX) 600 MG 12 hr tablet Take 2 tablets by mouth Daily As Needed for Congestion.      insulin aspart (novoLOG FLEXPEN) 100 UNIT/ML solution pen-injector sc pen Inject 50 Units under the skin into the appropriate area as directed 3 (Three) Times a Day With Meals. ADMINISTER 10 MINUTES BEFORE FOOD AS DIRECTED. REFRIGERATE UN-OPENED PENS. DISCARD CARTRIDGE 28 DAYS AFTER OPENING.      insulin glargine (LANTUS, SEMGLEE) 100 UNIT/ML injection Inject 30 Units under the skin into the appropriate area as directed Daily.      isosorbide mononitrate (IMDUR) 120 MG 24 hr tablet Take 1 tablet by mouth Daily. 90 tablet 3    lamoTRIgine (LaMICtal) 200 MG tablet Take 1 tablet by mouth 2 (Two) Times a Day. 180 tablet 3    levETIRAcetam (KEPPRA) 500 MG tablet Take 3 tablets by mouth Every Morning. 270 tablet 3    levETIRAcetam (KEPPRA) 500 MG tablet Take 4 tablets by mouth Every Night. 360 tablet 3    metFORMIN (GLUCOPHAGE) 1000 MG tablet Take 1 tablet by mouth 2 (Two) Times a Day With Meals.      metoprolol tartrate (LOPRESSOR) 100 MG tablet Take 1 tablet by mouth 2 (Two) Times a Day.      Multiple Vitamin (MULTI VITAMIN PO) Take 1 tablet by mouth Daily.      nitroglycerin (NITROSTAT) 0.4 MG SL tablet Place 1 tablet under the tongue Every 5 (Five) Minutes As Needed for Chest Pain. Take no more than 3 doses in 15 minutes. 25 tablet 2    pantoprazole (PROTONIX) 40 MG EC tablet Take 1 tablet by mouth 2 (Two) Times a Day.      polyethylene glycol (MIRALAX) 17 g packet Take 17 g by mouth Daily.      rimegepant sulfate ODT (Nurtec) 75 MG disintegrating tablet Place 1 tablet under the tongue 1 (One) Time As Needed (at onset) for up to 180 days. 8 tablet 5    rosuvastatin (CRESTOR) 40 MG tablet Take 0.5 tablets by mouth Every Night.      sacubitril-valsartan (Entresto)  MG  tablet Take 1 tablet by mouth 2 (Two) Times a Day.      tamsulosin (FLOMAX) 0.4 MG capsule 24 hr capsule Take 1 capsule by mouth Daily.      traMADol (ULTRAM) 50 MG tablet Take 1 tablet by mouth 4 (Four) Times a Day As Needed for Moderate Pain or Severe Pain (pain not relieved by Gabapentin).       No current facility-administered medications for this visit.     Review of Systems   Constitutional:  Negative for activity change.   HENT:  Negative for congestion.    Respiratory:  Negative for apnea and shortness of breath.    Gastrointestinal:  Negative for abdominal pain.   Musculoskeletal:  Positive for gait problem. Negative for arthralgias and back pain.   Neurological:  Positive for weakness and numbness.   Psychiatric/Behavioral:  Negative for agitation, behavioral problems and confusion.        OBJECTIVE     Vitals:    05/19/25 1127   BP: 132/84   Pulse: 72   SpO2: 97%         PHYSICAL EXAM  GEN:   Accompanied by wife.     Foot/Ankle Exam    GENERAL  Appearance:  appears stated age  Orientation:  AAOx3  Affect:  appropriate  Gait:  partial weight bearing  Assistance:  wheelchair  Right shoe gear: surgical shoe  Left shoe gear: casual shoe    VASCULAR     Right Foot Vascularity   Dorsalis pedis:  2+  Posterior tibial:  2+  Skin temperature:  warm  Edema grading:  None  CFT:  3  Pedal hair growth:  Present  Varicosities:  none     Left Foot Vascularity   Dorsalis pedis:  2+  Posterior tibial:  2+  Skin temperature:  warm  Edema grading:  None  CFT:  3  Pedal hair growth:  Present  Varicosities:  none     NEUROLOGIC     Right Foot Neurologic   Light touch sensation: diminished  Vibratory sensation: diminished  Hot/Cold sensation: diminished     Left Foot Neurologic   Light touch sensation: diminished  Vibratory sensation: diminished  Hot/Cold sensation:  diminished    MUSCULOSKELETAL     Right Foot Musculoskeletal   Ecchymosis:  none  Tenderness:  (Mildly to incision sites)  Arch:  Normal     Left Foot  Musculoskeletal   Ecchymosis:  none  Tenderness:  none  Arch:  Normal    MUSCLE STRENGTH     Right Foot Muscle Strength   Foot dorsiflexion:  5  Foot plantar flexion:  5  Foot inversion:  5  Foot eversion:  5     Left Foot Muscle Strength   Foot dorsiflexion:  5  Foot plantar flexion:  5  Foot inversion:  5  Foot eversion:  5    RANGE OF MOTION     Right Foot Range of Motion   Foot and ankle ROM within normal limits       Left Foot Range of Motion   Foot and ankle ROM within normal limits      DERMATOLOGIC      Right Foot Dermatologic   Skin  Right foot skin is intact.      Left Foot Dermatologic   Skin  Left foot skin is intact.      Right foot additional comments: Surgical dressing removed today.  No signs of infection or drainage.  Incision is healed and coapted.  Sutures removed today.    Image:       RADIOLOGY/NUCLEAR:  XR Toe 2+ View Right  Result Date: 4/30/2025  Narrative: XR TOE 2+ VW RIGHT- 4/30/2025 2:16 PM  HISTORY: post-op; G40.909-Epilepsy, unspecified, not intractable, without status epilepticus; S31.000A-Unspecified open wound of lower back and pelvis without penetration into retroperitoneum, initial encounter; L03.031-Cellulitis of right toe; A48.0-Gas gangrene; Z96.9-Presence of functional implant, unspecified; E11.59-Type 2 diabetes mellitus with other circulatory complications; Z79.4-Long term (curre  COMPARISON: 4/27/2025  FINDINGS: Frontal, lateral and oblique radiographs of the toes were provided for review.  Hardware from arthrodesis of the IP joint of the great toe is been removed. There is associated lucency and degenerative change at the first MTP joint and IP joint of the great toe. Patient is status post partial amputation of the second toe. There is mild osteopenia. No acute fracture is seen. Postoperative changes are noted in the soft tissues.      Impression:  1. Removal of hardware at the great toe with associated lucency and degenerative changes. 2. Partial amputation of the second  toe.  This report was signed and finalized on 4/30/2025 4:10 PM by Hari Bonilla.      MRI Brain With & Without Contrast  Result Date: 4/28/2025  Narrative: MRI BRAIN W WO CONTRAST- 4/28/2025 6:20 AM  HISTORY: recurrent breakthrough seizures; G40.909-Epilepsy, unspecified, not intractable, without status epilepticus  TECHNIQUE: Multisequence, multiplanar MRI of the brain before and after the intravenous administration of Vueway contrast.  COMPARISON: Brain MRI dated 12/1/2017  FINDINGS:  No diffusion signal abnormality. No intra-axial or extra-axial hemorrhage. No intracranial mass lesion or pathologic enhancement. Moderate chronic small vessel ischemic change. Hippocampal volumes appear symmetric. No obvious hippocampal sclerosis. Incidental tiny hippocampal cysts. Posterior fossa structures are unremarkable. Pituitary gland and sella are unremarkable. The major intracranial flow-voids are preserved. Orbital contents are unremarkable. The paranasal sinuses are clear. Partial right mastoid effusion. Normal marrow signal in the skull base and calvarium.      Impression:  1.  No acute intracranial process. 2.  No intracranial mass lesion or pathologic enhancement. 3.  No obvious hippocampal sclerosis. Hippocampal volumes and signal appear symmetric. 4.  Underlying moderate chronic small vessel ischemic change.  This report was signed and finalized on 4/28/2025 9:12 AM by Dr Merritt Reaves.      EEG  Result Date: 4/27/2025  Narrative: Reason for referral: 65 y.o.male with seizure Technical Summary:  A 19 channel digital EEG was performed using the international 10-20 placement system, including eye leads and EKG leads. Duration: 22 minutes Findings: The patient is asleep at the beginning of the study.  The background shows diffuse low amplitude theta present symmetrically over both hemispheres.  Toward the end of the study when the patient awakened there is an increase in faster theta and alpha frequencies.  A clear  posterior rhythm is not seen.  Photic stimulation does not change the background.  Hyperventilation is not performed.  No focal features or epileptiform activity are present. Video: Available Technical quality: Good Rhythm strip: Regular, 60 bpm SUMMARY: Excessive drowsiness Otherwise unremarkable EEG in the mostly asleep and briefly awake states No focal features or epileptiform activity are seen     Impression: Normal study This report is transcribed using the Dragon dictation system.      XR Foot 2 View Right  Result Date: 4/27/2025  Narrative: XR FOOT 2 VW RIGHT-  HISTORY: right 2nd toe with wound and redness radiating up foot; G40.909-Epilepsy, unspecified, not intractable, without status epilepticus  COMPARISON: 7/27/2024  FINDINGS: 2 views of the right foot are obtained.  Surgical screw of the great toe from prior interphalangeal arthrodesis does demonstrate hardware loosening with mild retraction of the screw within the bony structure of the great toe. Advanced chronic arthritic changes of the first MTP and interphalangeal joint of the great toe.  There is soft tissue swelling of the distal right second toe no convincing radiographic evidence of underlying osteomyelitis. There is small soft tissue air/subcutaneous emphysema within the edematous distal right second toe which may correspond to a region ulcerative defect. Calcaneal spurring. Similar calcaneal osteotomy changes suspected. Mild arthritic change midfoot      Impression: 1. Soft tissue swelling and mild subcutaneous emphysema involving the soft tissues of the distal right second toe with no convincing radiographic evidence of underlying osteomyelitis. 2. Hardware loosening involving the surgical screw of the great toe with mild retraction of the surgical screw compared to 7/27/2024.  This report was signed and finalized on 4/27/2025 11:42 AM by Dr. Ml Etsrada MD.      CT Head Without Contrast  Result Date: 4/26/2025  Narrative: EXAMINATION: CT  HEAD WO CONTRAST-  4/26/2025 4:58 PM  HISTORY: seizures  COMPARISON: 9/21/2023  DLP: 1069.19 mGy.cm  In order to have a CT radiation dose as low as reasonably achievable, Automated Exposure Control was utilized for adjustment of the mA and/or KV according to patient size.  TECHNIQUE: Serial axial tomographic images of the brain were obtained without the use of intravenous contrast.  Coronal and sagittal reformatted images were obtained reviewed as well.  FINDINGS: No mass effect or midline shift. No acute hemorrhage. There is mild low-attenuation in the subcortical and periventricular white matter as can be seen with chronic microvascular change. Vascular calcifications in the vertebral arteries in the intracranial portions of the carotid arteries.  Surrounding soft tissues are without acute findings. Orbits and globes are preserved. Paranasal sinus disease with an air-fluid level in the RIGHT maxillary sinus suggesting mild acute sinusitis. Mild scattered mucosal thickening in the ethmoid air cells. Small mastoid effusion on the RIGHT, unchanged from 2023 and likely chronic.       Impression:  1.  Mild atrophy and chronic white matter changes but no definite acute intracranial process.  2.  Paranasal sinus disease as discussed above.  This report was signed and finalized on 4/26/2025 5:00 PM by Dr. Valentino Lebron MD.      XR Chest 1 View  Result Date: 4/26/2025  Narrative: XR CHEST 1 VW-  HISTORY: rule out pneumonia; R56.9-Unspecified convulsions  COMPARISON: 12/26/2024  FINDINGS: Frontal view of the chest obtained.  Stable cardiomegaly with evidence of prior mediastinal surgery. Chronic mild prominence of pulmonary vascular structures with no acute consolidation. No pleural effusion or pneumothorax. No acute regional bony pathology.      Impression: 1. Prior mediastinal surgery with stable cardiomegaly and mild chronic change. No acute abnormality.   This report was signed and finalized on 4/26/2025 10:14 AM by  Dr. Ml Estrada MD.        LABORATORY/CULTURE RESULTS:      PATHOLOGY RESULTS:       ASSESSMENT/PLAN     Diagnoses and all orders for this visit:    1. 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 Status post hardware removal    3. Type 2 diabetes mellitus with diabetic polyneuropathy, with long-term current use of insulin    4. Foot callus        Comprehensive lower extremity examination and evaluation was performed.  Discussed findings and treatment plan including risks, benefits, and treatment options with patient in detail. Patient agreed with treatment plan.  After verbal consent obtained, calluses x2 pared utilizing dermal curette and/or scalpel without incidence  Patient may maintain nails and calluses at home utilizing emery board or pumice stone between visits as needed  Reviewed at home diabetic foot care including daily foot checks   Dressing removed today.  Incision healed and coapted.  Sutures removed.  No signs of infection or drainage.  Patient may be full weightbearing and begin to transition into a supportive shoe.  Patient may wash the foot, but do not soak.  Patient will return in approximately 2 months for diabetic foot and nail care.  Patient may contact the office with any concerns before next scheduled appointment.   Encouraged patient and wife to contact the office with any questions or concerns before next appointment.   Patient may maintain callused areas by using a pumice stone or emery board after getting out of the shower.  After filing the areas down, a good moisturizer should be applied to the areas.  It is recommended that the patient continue to monitor the areas for any drainage or signs of infection.  Callused areas may turn into a wound or open sore and should be monitored closely.   An After Visit Summary was printed and given to the patient at discharge, including (if requested) any available informative/educational handouts regarding diagnosis,  treatment, or medications. All questions were answered to patient/family satisfaction. Should symptoms fail to improve or worsen they agree to call or return to clinic or to go to the Emergency Department. Discussed the importance of following up with any needed screening tests/labs/specialist appointments and any requested follow-up recommended by me today. Importance of maintaining follow-up discussed and patient accepts that missed appointments can delay diagnosis and potentially lead to worsening of conditions.  I spent 23 minutes caring for Carlos A on this date of service. This time includes time spent by me in the following activities: preparing for the visit, performing a medically appropriate examination and/or evaluation, counseling and educating the patient/family/caregiver, and documenting information in the medical record  I spent 4 minutes on the separately reported service of callus parichevy. This time is not included in the time used to support the E/M service also reported today.    Return in about 2 months (around 7/19/2025) for Diabetic foot care clinic, With Cristel DICKINSON., or sooner if acute issues arise.      This document has been electronically signed by TEENA Nice on May 19, 2025 12:21 CDT

## 2025-05-19 ENCOUNTER — OFFICE VISIT (OUTPATIENT)
Age: 65
End: 2025-05-19
Payer: OTHER GOVERNMENT

## 2025-05-19 ENCOUNTER — TELEPHONE (OUTPATIENT)
Age: 65
End: 2025-05-19

## 2025-05-19 VITALS
HEIGHT: 72 IN | DIASTOLIC BLOOD PRESSURE: 84 MMHG | HEART RATE: 72 BPM | OXYGEN SATURATION: 97 % | WEIGHT: 239 LBS | SYSTOLIC BLOOD PRESSURE: 132 MMHG | BODY MASS INDEX: 32.37 KG/M2

## 2025-05-19 DIAGNOSIS — L84 FOOT CALLUS: ICD-10-CM

## 2025-05-19 DIAGNOSIS — E11.42 TYPE 2 DIABETES MELLITUS WITH DIABETIC POLYNEUROPATHY, WITH LONG-TERM CURRENT USE OF INSULIN: ICD-10-CM

## 2025-05-19 DIAGNOSIS — Z79.4 TYPE 2 DIABETES MELLITUS WITH DIABETIC POLYNEUROPATHY, WITH LONG-TERM CURRENT USE OF INSULIN: ICD-10-CM

## 2025-05-19 DIAGNOSIS — Z89.421 STATUS POST AMPUTATION OF LESSER TOE OF RIGHT FOOT: Primary | ICD-10-CM

## 2025-05-19 DIAGNOSIS — Z98.890 STATUS POST HARDWARE REMOVAL: ICD-10-CM

## 2025-05-19 NOTE — TELEPHONE ENCOUNTER
The [facility] received a fax that requires your attention. The document has been indexed to the patient’s chart for your review.      Reason for sending: RECEIVED RECORDS REQUEST FROM THE [facility] Description: RECORDS [id number]-8/16/2025    Name of Sender: [facility]    Date Indexed: 5/19/2025

## 2025-05-22 ENCOUNTER — TELEPHONE (OUTPATIENT)
Dept: WOUND CARE | Facility: HOSPITAL | Age: 65
End: 2025-05-22
Payer: OTHER GOVERNMENT

## 2025-05-22 NOTE — TELEPHONE ENCOUNTER
Spoke with Cristel and she stated his toe was like that at his last apt and there is nothing to be worried about. Pt denies odor, pain, discharge, swelling, or fever. Pt was told if the area was to get worse to please contact the office and we will get him in to see the providers.

## 2025-05-22 NOTE — TELEPHONE ENCOUNTER
Provider: GIO    Caller: Carlos A Boyer Jr.    Relationship to Patient: Self    Pharmacy:     Phone Number: [phone]    Reason for Call: PT CALLED AND STATED THAT HIS TOE HE HAD SX ON, ON 4/30/25 IS CRACKING AND IS REDISH PURPLE AND IS CONCERNED. ATTEMPTED TO WT

## 2025-05-29 ENCOUNTER — HOSPITAL ENCOUNTER (OUTPATIENT)
Dept: GENERAL RADIOLOGY | Facility: HOSPITAL | Age: 65
Discharge: HOME | End: 2025-05-29
Payer: OTHER GOVERNMENT

## 2025-05-29 ENCOUNTER — OFFICE VISIT (OUTPATIENT)
Dept: WOUND CARE | Facility: HOSPITAL | Age: 65
End: 2025-05-29
Payer: OTHER GOVERNMENT

## 2025-05-29 ENCOUNTER — TRANSCRIBE ORDERS (OUTPATIENT)
Dept: ADMINISTRATIVE | Facility: HOSPITAL | Age: 65
End: 2025-05-29
Payer: OTHER GOVERNMENT

## 2025-05-29 ENCOUNTER — LAB REQUISITION (OUTPATIENT)
Dept: LAB | Facility: HOSPITAL | Age: 65
End: 2025-05-29
Payer: OTHER GOVERNMENT

## 2025-05-29 DIAGNOSIS — L97.512 NON-PRESSURE CHRONIC ULCER OF OTHER PART OF RIGHT FOOT WITH FAT LAYER EXPOSED: ICD-10-CM

## 2025-05-29 DIAGNOSIS — L97.512 RIGHT FOOT ULCER, WITH FAT LAYER EXPOSED: ICD-10-CM

## 2025-05-29 DIAGNOSIS — L97.512 RIGHT FOOT ULCER, WITH FAT LAYER EXPOSED: Primary | ICD-10-CM

## 2025-05-29 PROCEDURE — 87176 TISSUE HOMOGENIZATION CULTR: CPT | Performed by: PODIATRIST

## 2025-05-29 PROCEDURE — 87205 SMEAR GRAM STAIN: CPT | Performed by: PODIATRIST

## 2025-05-29 PROCEDURE — 87186 SC STD MICRODIL/AGAR DIL: CPT | Performed by: PODIATRIST

## 2025-05-29 PROCEDURE — 73660 X-RAY EXAM OF TOE(S): CPT

## 2025-05-29 PROCEDURE — G0463 HOSPITAL OUTPT CLINIC VISIT: HCPCS

## 2025-05-29 PROCEDURE — 87077 CULTURE AEROBIC IDENTIFY: CPT | Performed by: PODIATRIST

## 2025-05-29 PROCEDURE — 87075 CULTR BACTERIA EXCEPT BLOOD: CPT | Performed by: PODIATRIST

## 2025-05-29 PROCEDURE — 87070 CULTURE OTHR SPECIMN AEROBIC: CPT | Performed by: PODIATRIST

## 2025-06-01 LAB
BACTERIA SPEC AEROBE CULT: ABNORMAL
BACTERIA SPEC ANAEROBE CULT: NORMAL
GRAM STN SPEC: ABNORMAL
GRAM STN SPEC: ABNORMAL

## 2025-06-03 LAB — BACTERIA SPEC ANAEROBE CULT: NORMAL

## 2025-06-05 ENCOUNTER — OFFICE VISIT (OUTPATIENT)
Dept: WOUND CARE | Facility: HOSPITAL | Age: 65
End: 2025-06-05
Payer: OTHER GOVERNMENT

## 2025-06-05 PROCEDURE — G0463 HOSPITAL OUTPT CLINIC VISIT: HCPCS

## 2025-06-17 ENCOUNTER — OFFICE VISIT (OUTPATIENT)
Dept: CARDIOLOGY | Facility: CLINIC | Age: 65
End: 2025-06-17
Payer: OTHER GOVERNMENT

## 2025-06-17 VITALS
DIASTOLIC BLOOD PRESSURE: 81 MMHG | WEIGHT: 243 LBS | SYSTOLIC BLOOD PRESSURE: 145 MMHG | HEIGHT: 72 IN | OXYGEN SATURATION: 99 % | BODY MASS INDEX: 32.91 KG/M2 | HEART RATE: 53 BPM

## 2025-06-17 DIAGNOSIS — I48.0 PAROXYSMAL ATRIAL FIBRILLATION: ICD-10-CM

## 2025-06-17 DIAGNOSIS — E66.09 CLASS 1 OBESITY DUE TO EXCESS CALORIES WITH SERIOUS COMORBIDITY AND BODY MASS INDEX (BMI) OF 32.0 TO 32.9 IN ADULT: ICD-10-CM

## 2025-06-17 DIAGNOSIS — I50.32 CHRONIC DIASTOLIC CONGESTIVE HEART FAILURE: Primary | ICD-10-CM

## 2025-06-17 DIAGNOSIS — E78.5 HYPERLIPIDEMIA LDL GOAL <70: ICD-10-CM

## 2025-06-17 DIAGNOSIS — E66.811 CLASS 1 OBESITY DUE TO EXCESS CALORIES WITH SERIOUS COMORBIDITY AND BODY MASS INDEX (BMI) OF 32.0 TO 32.9 IN ADULT: ICD-10-CM

## 2025-06-17 DIAGNOSIS — Z79.01 CHRONIC ANTICOAGULATION: ICD-10-CM

## 2025-06-17 DIAGNOSIS — I25.10 CORONARY ARTERY DISEASE INVOLVING NATIVE CORONARY ARTERY OF NATIVE HEART WITHOUT ANGINA PECTORIS: ICD-10-CM

## 2025-06-17 DIAGNOSIS — Z79.4 TYPE 2 DIABETES MELLITUS WITH OTHER CIRCULATORY COMPLICATION, WITH LONG-TERM CURRENT USE OF INSULIN: ICD-10-CM

## 2025-06-17 DIAGNOSIS — I10 PRIMARY HYPERTENSION: ICD-10-CM

## 2025-06-17 DIAGNOSIS — I65.23 BILATERAL CAROTID ARTERY STENOSIS: ICD-10-CM

## 2025-06-17 DIAGNOSIS — I71.21 ANEURYSM OF ASCENDING AORTA WITHOUT RUPTURE: ICD-10-CM

## 2025-06-17 DIAGNOSIS — E11.59 TYPE 2 DIABETES MELLITUS WITH OTHER CIRCULATORY COMPLICATION, WITH LONG-TERM CURRENT USE OF INSULIN: ICD-10-CM

## 2025-06-17 DIAGNOSIS — G47.33 OSA ON CPAP: ICD-10-CM

## 2025-06-17 NOTE — PROGRESS NOTES
Subjective:     Encounter Date: 06/17/2025      Patient ID: Carlos A Boyer Jr. is a 65 y.o. male with chronic diastolic congestive heart failure s/p CardioMEMs, coronary artery disease status post coronary artery stenting and CABG x2 (LIMA to LAD and SVG to OM) 7/6/2019, paroxysmal atrial fibrillation status post cardioversion on chronic anticoagulation, hypertension, hyperlipidemia, type 2 diabetes mellitus, peripheral vascular disease, cerebrovascular accident, obstructive sleep apnea and obesity    Chief Complaint: no complaints  History of Present Illness  Patient presents today for management of congestive heart failure and coronary artery disease. Today he reports that he has been feeling ok. He reports that he had his second toe on right foot amputated and hardware removed from great toe. He has been released from wound care and is healing well. He reports that he has some mild chest discomfort occasionally if he has over-exerted himself. He reports some dyspnea on exertion with normal activity that has been chronic and unchanged. He reports that his inhaler helps. He denies any heart racing or palpitations. He denies any leg swelling, orthopnea or PND. He hasn't needed to take lasix in months per his report. He reports that he has lost some weight recently. He denies any dizziness or near syncope. He reports that his BP has remained well controlled and running 120-140/70-80. He takes eliquis and denies any other bleeding issues. Patient follows with Vinayak Correia as PCP.     The following portions of the patient's history were reviewed and updated as appropriate: allergies, current medications, past family history, past medical history, past social history, past surgical history and problem list.    Allergies   Allergen Reactions    Flagyl [Metronidazole] Hives    Atorvastatin Other (See Comments) and Myalgia      - LIPITOR -   Muscle cramps    Ciprofloxacin Hives      - CIPRO -        Current  Outpatient Medications:     acetaminophen (TYLENOL) 325 MG tablet, Take 1 tablet by mouth Every 6 (Six) Hours As Needed for Mild Pain., Disp: , Rfl:     apixaban (ELIQUIS) 5 MG tablet tablet, Take 1 tablet by mouth 2 (Two) Times a Day., Disp: , Rfl:     aspirin 81 MG EC tablet, Take 1 tablet by mouth Daily., Disp: , Rfl:     calcium polycarbophil (FIBERCON) 625 MG tablet, Take 1 tablet by mouth Daily., Disp: , Rfl:     docusate sodium (COLACE) 100 MG capsule, Take 1 capsule by mouth Daily As Needed for Constipation., Disp: , Rfl:     empagliflozin (JARDIANCE) 25 MG tablet tablet, Take 1 tablet by mouth Daily., Disp: , Rfl:     ezetimibe (ZETIA) 10 MG tablet, Take 1 tablet by mouth Daily., Disp: , Rfl:     fluticasone (FLONASE) 50 MCG/ACT nasal spray, Administer 2 sprays into the nostril(s) as directed by provider Daily., Disp: , Rfl:     furosemide (Lasix) 40 MG tablet, Take 1 tablet by mouth Daily As Needed (take as needed for weight gain more than 2 pounds in a day or more than than 3 pounds in 2 days.)., Disp: 90 tablet, Rfl: 3    gabapentin (NEURONTIN) 300 MG capsule, Take 2 capsules by mouth 2 (Two) Times a Day., Disp: , Rfl:     guaiFENesin (MUCINEX) 600 MG 12 hr tablet, Take 2 tablets by mouth Daily As Needed for Congestion., Disp: , Rfl:     insulin aspart (novoLOG FLEXPEN) 100 UNIT/ML solution pen-injector sc pen, Inject 50 Units under the skin into the appropriate area as directed 3 (Three) Times a Day With Meals. ADMINISTER 10 MINUTES BEFORE FOOD AS DIRECTED. REFRIGERATE UN-OPENED PENS. DISCARD CARTRIDGE 28 DAYS AFTER OPENING., Disp: , Rfl:     insulin glargine (LANTUS, SEMGLEE) 100 UNIT/ML injection, Inject 30 Units under the skin into the appropriate area as directed Daily., Disp: , Rfl:     isosorbide mononitrate (IMDUR) 120 MG 24 hr tablet, Take 1 tablet by mouth Daily., Disp: 90 tablet, Rfl: 3    lamoTRIgine (LaMICtal) 200 MG tablet, Take 1 tablet by mouth 2 (Two) Times a Day., Disp: 180 tablet, Rfl:  3    levETIRAcetam (KEPPRA) 500 MG tablet, Take 3 tablets by mouth Every Morning., Disp: 270 tablet, Rfl: 3    levETIRAcetam (KEPPRA) 500 MG tablet, Take 4 tablets by mouth Every Night., Disp: 360 tablet, Rfl: 3    metFORMIN (GLUCOPHAGE) 1000 MG tablet, Take 1 tablet by mouth 2 (Two) Times a Day With Meals., Disp: , Rfl:     metoprolol tartrate (LOPRESSOR) 100 MG tablet, Take 1 tablet by mouth 2 (Two) Times a Day., Disp: , Rfl:     Multiple Vitamin (MULTI VITAMIN PO), Take 1 tablet by mouth Daily., Disp: , Rfl:     nitroglycerin (NITROSTAT) 0.4 MG SL tablet, Place 1 tablet under the tongue Every 5 (Five) Minutes As Needed for Chest Pain. Take no more than 3 doses in 15 minutes., Disp: 25 tablet, Rfl: 2    pantoprazole (PROTONIX) 40 MG EC tablet, Take 1 tablet by mouth 2 (Two) Times a Day., Disp: , Rfl:     polyethylene glycol (MIRALAX) 17 g packet, Take 17 g by mouth Daily., Disp: , Rfl:     rimegepant sulfate ODT (Nurtec) 75 MG disintegrating tablet, Place 1 tablet under the tongue 1 (One) Time As Needed (at onset) for up to 180 days., Disp: 8 tablet, Rfl: 5    rosuvastatin (CRESTOR) 40 MG tablet, Take 0.5 tablets by mouth Every Night., Disp: , Rfl:     sacubitril-valsartan (Entresto)  MG tablet, Take 1 tablet by mouth 2 (Two) Times a Day., Disp: , Rfl:     tamsulosin (FLOMAX) 0.4 MG capsule 24 hr capsule, Take 1 capsule by mouth Daily., Disp: , Rfl:     traMADol (ULTRAM) 50 MG tablet, Take 1 tablet by mouth 4 (Four) Times a Day As Needed for Moderate Pain or Severe Pain (pain not relieved by Gabapentin)., Disp: , Rfl:   Past Medical History:   Diagnosis Date    Arthritis     Asthma     Cancer     skin    Carotid disease, bilateral     Cellulitis     right foot - on antibiotics 6-    Chest pain     Chronic diastolic congestive heart failure 09/07/2019    Chronic sinusitis     [place]ib[place] bullosa     Coronary artery disease involving native coronary artery of native heart with unstable angina pectoris  2017    Deviated septum     Diabetes mellitus     Difficulty urinating     Diverticulitis     Enlarged prostate     Fatty liver     GERD (gastroesophageal reflux disease)     [place] (hard of hearing)     Does have hearing aids    Hyperlipidemia LDL goal <70 2017    Hypertrophy of nasal turbinates     Keratoderma     Kidney stone     Lung nodules     lower right lung    Migraine     Murmur, heart     Myocardial infarction     Obesity     Paroxysmal atrial fibrillation 2019    Personal history of COVID-19 2021    PONV (postoperative nausea and vomiting)     Primary hypertension 10/16/2016    Psoriasis     Seizures     Sinus congestion     Skin cancer     Sleep apnea     not using cpap    SOB (shortness of breath)     Stroke     mild weakness on right side    UTI (urinary tract infection)      Social History     Socioeconomic History    Marital status:    Tobacco Use    Smoking status: Former     Current packs/day: 0.00     Average packs/day: 1.5 packs/day for 18.0 years (27.0 ttl pk-yrs)     Types: Cigarettes, Cigars     Start date:      Quit date:      Years since quittin.4     Passive exposure: Past    Smokeless tobacco: Former     Types: Chew     Quit date:    Vaping Use    Vaping status: Never Used   Substance and Sexual Activity    Alcohol use: No     Comment: 0    Drug use: No    Sexual activity: Defer       Review of Systems   Constitutional: Negative for malaise/fatigue.   HENT:  Negative for nosebleeds.    Cardiovascular:  Positive for dyspnea on exertion (chronic and unchanged). Negative for chest pain, irregular heartbeat, leg swelling, near-syncope, orthopnea, palpitations, paroxysmal nocturnal dyspnea and syncope.   Respiratory:  Negative for shortness of breath.    Hematologic/Lymphatic: Does not bruise/bleed easily.   Gastrointestinal:  Negative for abdominal pain.   Genitourinary:  Negative for hematuria.   Neurological:  Negative for dizziness and headaches.  "  All other systems reviewed and are negative.         Objective:     Vitals reviewed.   Constitutional:       General: Not in acute distress.     Appearance: Normal appearance. Well-developed. Obese.   Eyes:      Pupils: Pupils are equal, round, and reactive to light.   HENT:      Head: Normocephalic and atraumatic.      Nose: Nose normal.   Neck:      Vascular: No carotid bruit.   Pulmonary:      Effort: Pulmonary effort is normal. No respiratory distress.   Cardiovascular:      Normal rate. Regular rhythm.      Murmurs: There is no murmur.   Edema:     Peripheral edema absent.   Abdominal:      General: There is no distension.      Palpations: Abdomen is soft.   Musculoskeletal: Normal range of motion.      Cervical back: Normal range of motion and neck supple. Skin:     General: Skin is warm.      Findings: No erythema or rash.   Neurological:      General: No focal deficit present.      Mental Status: Alert and oriented to person, place, and time.   Psychiatric:         Attention and Perception: Attention normal.         Mood and Affect: Mood normal.         Speech: Speech normal.         Behavior: Behavior normal.         Thought Content: Thought content normal.         Judgment: Judgment normal.         /81   Pulse 53   Ht 182.9 cm (72\")   Wt 110 kg (243 lb)   SpO2 99%   BMI 32.96 kg/m²     Procedures    Lab Review:       Results for orders placed during the hospital encounter of 04/17/23    Adult Transthoracic Echo Complete W/ Cont if Necessary Per Protocol    Interpretation Summary    Left ventricular systolic function is normal. Left ventricular ejection fraction appears to be 61 - 65%.    The following left ventricular wall segments are very mildly hypokinetic: apical inferior, apical septal and apex hypokinetic.    Left ventricular diastolic function is consistent with (grade II w/high LAP) pseudonormalization.    Left atrial volume is mildly increased.    Estimated right ventricular systolic " pressure from tricuspid regurgitation is normal (<35 mmHg).    Normal size and function of the right ventricle.    No significant valvular pathology.    No significant change compared to prior exam from 4/29/21.      Nuclear stress 9/21/2022:   Interpretation Summary  Diaphragmatic attenuation artifact is present.  Left ventricular ejection fraction is normal. (Calculated EF = 68%).  Moderate apical and apical lateral ischemia  See bull's-eye diagram below     Holter monitor 8/15/2022:  Conclusion:   Baseline rhythm was sinus.  No significant ectopy   Single 7 beat run of supraventricular tachycardia at a maximum rate of 122 bpm at 5:04 PM      Lab Results   Component Value Date    GLUCOSE 218 (H) 05/02/2025    CALCIUM 8.7 05/02/2025     05/02/2025    K 4.2 05/02/2025    CO2 21.0 (L) 05/02/2025     05/02/2025    BUN 19 05/02/2025    CREATININE 0.89 05/02/2025    EGFRIFAFRI 27.5 (L) 08/25/2023    EGFRIFNONA 22.7 (L) 08/25/2023    BCR 21.3 05/02/2025    ANIONGAP 11.0 05/02/2025     Lab Results   Component Value Date    CHOL 97 04/27/2025    CHLPL 143 01/07/2016    TRIG 115 04/27/2025    HDL 30 (L) 04/27/2025    LDL 46 04/27/2025     I have personally reviewed echo, nuclear stress, holter monitor, labs and past office notes prior to patients visit  Assessment:          Diagnosis Plan   1. Chronic diastolic congestive heart failure        2. Coronary artery disease involving native coronary artery of native heart without angina pectoris        3. Paroxysmal atrial fibrillation        4. Chronic anticoagulation        5. Primary hypertension        6. Hyperlipidemia LDL goal <70        7. Bilateral carotid artery stenosis        8. Type 2 diabetes mellitus with other circulatory complication, with long-term current use of insulin        9. HOA on CPAP        10. Aneurysm of ascending aorta without rupture        11. Class 1 obesity due to excess calories with serious comorbidity and body mass index (BMI) of  32.0 to 32.9 in adult                     Plan:       1. Chronic diastolic congestive heart failure: NYHA class II. Stage C. Compensated and stable in office today. Continue Entresto, Jardiance, spironolactone and metoprolol. Discussed to continue lasix on an as needed basis. Reviewed signs and symptoms of CHF and what to report to office with patient. Patient instructed to restrict sodium and record daily weight. Patient is to report weight gain of greater than 2 lbs overnight or 5 lbs in 1 week. Patient verbalized understanding of instructions and plan of care.  He had Cardiomems placed but isnt using the pillow currently. He reports that he doesn't have good internet connection at his house.     2. CAD: s/p CABG x 2 LIMA to LAD and SVG to OM on 7/6/2019 per Dr Tang. Low risk Stress test 5/21/2024. Continue aspirin, metoprolol and rosuvastatin. Patient reports rare chronic chest pain. Continue Imdur. Discussed with patient to take NTG as needed for severe chest pain.     3. Paroxysmal atrial fibrillation: No recurrence on holter monitor 9/2022. Continue metoprolol and Eliquis.     4. Chronic anticoagulation: patient is on eliquis. He denies any bleeding issues    5. Hypertension: Controlled in office today. Continue current medications. Recommend to monitor routinely and notify office if consistently >140/90.     6. Hyperlipidemia: Lipid panel from 4/27/2025 low HDL and otherwise well controlled cholesterol. LDL 46 (which is at goal of <55). Continue rosuvastatin, zetia and tricor    7. Bilateral carotid artery stenosis: Follows with vascular, Dr Echavarria    8. Type 2 DM: managed and followed by PCP. Continue jardiance. A1C 7.3     9. HOA: Patient reports non compliance with CPAP. He states that he can't breathe with it on. Was seen by Dr Ibarra for inspire but was not done due to patient being high risk for surgery.    10. Thoracic aortic aneurysm: patient continues to follow up with CTS, Dr Tang for routine  surveillance. Stable at 4.1 cm on CTA 4/2025.    11. Obesity: BMI is >= 30 and <35. (Class 1 Obesity). The following options were offered after discussion;: exercise counseling/recommendations and nutrition counseling/recommendations    I attest that all portions of this note reviewed and all information has been updated by myself to reflect the patient's current status.      I spent 35 minutes caring for Carlos A on this date of service. This time includes time spent by me in the following activities:preparing for the visit, reviewing tests, obtaining and/or reviewing a separately obtained history, performing a medically appropriate examination and/or evaluation , counseling and educating the patient/family/caregiver, and documenting information in the medical record    Patient is to follow up in office in 6 months or sooner if needed.

## 2025-06-20 ENCOUNTER — TELEPHONE (OUTPATIENT)
Dept: CARDIOLOGY | Facility: CLINIC | Age: 65
End: 2025-06-20
Payer: OTHER GOVERNMENT

## 2025-06-20 NOTE — TELEPHONE ENCOUNTER
"Patient called as a reminder that he has not used his CardioMems pillow since 08/2024. Patient states that he does not have good internet where he lives. I attempted to give him the number to Tech support and he did not want the number. \"Give me a couple of weeks and let me see what I can do about my internet\". I instructed patient to call me back and let me know if he would like the number for assistance or if he decides to stop using his CardioMems pillow all together.  "

## 2025-06-23 ENCOUNTER — OFFICE VISIT (OUTPATIENT)
Dept: NEUROLOGY | Facility: CLINIC | Age: 65
End: 2025-06-23
Payer: OTHER GOVERNMENT

## 2025-06-23 VITALS
BODY MASS INDEX: 32.91 KG/M2 | HEART RATE: 57 BPM | DIASTOLIC BLOOD PRESSURE: 68 MMHG | WEIGHT: 243 LBS | SYSTOLIC BLOOD PRESSURE: 130 MMHG | OXYGEN SATURATION: 96 % | HEIGHT: 72 IN

## 2025-06-23 DIAGNOSIS — Z86.73 HISTORY OF ISCHEMIC STROKE: ICD-10-CM

## 2025-06-23 DIAGNOSIS — G63 POLYNEUROPATHY ASSOCIATED WITH UNDERLYING DISEASE: ICD-10-CM

## 2025-06-23 DIAGNOSIS — I10 PRIMARY HYPERTENSION: Chronic | ICD-10-CM

## 2025-06-23 DIAGNOSIS — G43.009 MIGRAINE WITHOUT AURA AND WITHOUT STATUS MIGRAINOSUS, NOT INTRACTABLE: ICD-10-CM

## 2025-06-23 DIAGNOSIS — I48.0 PAROXYSMAL ATRIAL FIBRILLATION: Chronic | ICD-10-CM

## 2025-06-23 DIAGNOSIS — G40.909 SEIZURE DISORDER: Primary | ICD-10-CM

## 2025-06-23 DIAGNOSIS — Z79.01 CHRONIC ANTICOAGULATION: Chronic | ICD-10-CM

## 2025-06-23 DIAGNOSIS — E78.5 HYPERLIPIDEMIA LDL GOAL <70: Chronic | ICD-10-CM

## 2025-06-23 DIAGNOSIS — G47.33 OBSTRUCTIVE SLEEP APNEA: ICD-10-CM

## 2025-06-23 PROCEDURE — 99214 OFFICE O/P EST MOD 30 MIN: CPT

## 2025-06-23 NOTE — PROGRESS NOTES
Neurology Consult Note    [facility], [facility]  [address], [address]  [place] [address]  Phone: [phone]  Fax: [phone]    Referring Provider:   GINA Felder  Primary Care Provider:  Vinayak Correia PA    Reason for Consult:  Seizure  Migraine  Neuropathy  Subjective        History of Present Illness  65-year-old male seen for follow-up of seizures, migraine and polyneuropathy.  Last seen in my clinic on 2025.  During that encounter I continued patient on his lamotrigine 200 mg twice daily as well as levetiracetam 1500 mg in the morning and 2000 mg at night.  He was continued on Nurtec for migraine .  He is also managed on gabapentin 300 mg nightly  through primary care for neuropathy.    Since last being seen, patient did present to emergency room on 2026 for seizure.  Reported this lasted approximately 25 to 30 minutes.  Episode was consistent with previous seizures of full body shaking and garbled speech with confusion however no tongue biting or urinary incontinence.  He was discharged home.  He presented back on 2025 after being found in the floor very weak and confused.  Workup did reveal a lower foot infection.  Patient was compliant with medications.  He had no further seizures.  He was discharged home.    Today patient presents by himself.  Reports to me no recurrent seizures since being discharged from the hospital.  He notes compliance with medications.  He does tell me he had a toe amputation from the infection.  He notes migraines are controlled.  Neuropathy symptoms are controlled as well.  No concerns voiced today.  He is following seizure precautions.  Patient Active Problem List   Diagnosis    Type 2 diabetes mellitus with circulatory disorder, with long-term current use of insulin    Gastroesophageal reflux disease without esophagitis    Primary hypertension    Seizure disorder    Carotid disease, bilateral    Coronary artery disease  involving native coronary artery of native heart without angina pectoris    Hyperlipidemia LDL goal <70    Chronic left arterial ischemic stroke, ICA (internal carotid artery)    HOA on CPAP    Benign non-nodular prostatic hyperplasia with lower urinary tract symptoms    Deviated nasal septum    Maribell bullosa    Hypertrophy of both inferior nasal turbinates    Chronic sinusitis of both maxillary sinuses    S/P FESS (functional endoscopic sinus surgery)    Diabetic complication    Epigastric pain    Diabetic peripheral neuropathy    Class 1 obesity due to excess calories with serious comorbidity and body mass index (BMI) of 34.0 to 34.9 in adult    Aspirin-like platelet function defect    History of seizure    Lung nodules    Cardiac murmur    Thoracic aortic aneurysm without rupture    Paroxysmal atrial fibrillation    Atrial flutter    Chronic diastolic congestive heart failure with preserved ejection fraction    Fluid overload    Bilateral pleural effusion    Respiratory distress    Long-term use of high-risk medication    Dysphagia    Left lower quadrant abdominal pain    COVID-19 virus detected    Shortness of breath    S/P CABG x 2    Chronic anticoagulation    Old complex tear of lateral meniscus of right knee    BPH with obstruction/lower urinary tract symptoms    Daytime somnolence    Snoring    Aneurysm of ascending aorta without rupture    Presence of CardioMEMS HF system    Chest pain    Non-pressure chronic ulcer of other part of right foot with fat layer exposed    Diabetic ulcer of toe of right foot associated with type 2 diabetes mellitus, with fat layer exposed    Deformity, toe acquired, right    Cellulitis of right toe    Chronic heart failure with preserved ejection fraction (HFpEF)    Recurrent seizures    Gas gangrene of foot    Retained orthopedic hardware        Past Medical History:   Diagnosis Date    Arthritis     Asthma     Cancer     skin    Carotid disease, bilateral     Cellulitis      right foot - on antibiotics 2023    Chest pain     Chronic diastolic congestive heart failure 2019    Chronic sinusitis     Maribell bullosa     Coronary artery disease involving native coronary artery of native heart with unstable angina pectoris 2017    Deviated septum     Diabetes mellitus     Difficulty urinating     Diverticulitis     Enlarged prostate     Fatty liver     GERD (gastroesophageal reflux disease)     [place] (hard of hearing)     Does have hearing aids    Hyperlipidemia LDL goal <70 2017    Hypertrophy of nasal turbinates     Keratoderma     Kidney stone     Lung nodules     lower right lung    Migraine     Murmur, heart     Myocardial infarction     Obesity     Paroxysmal atrial fibrillation 2019    Personal history of COVID-19 2021    PONV (postoperative nausea and vomiting)     Primary hypertension 10/16/2016    Psoriasis     Seizures     Sinus congestion     Skin cancer     Sleep apnea     not using cpap    SOB (shortness of breath)     Stroke     mild weakness on right side    UTI (urinary tract infection)         Social History     Socioeconomic History    Marital status:    Tobacco Use    Smoking status: Former     Current packs/day: 0.00     Average packs/day: 1.5 packs/day for 18.0 years (27.0 ttl pk-yrs)     Types: Cigarettes, Cigars     Start date:      Quit date:      Years since quittin.4     Passive exposure: Past    Smokeless tobacco: Former     Types: Chew     Quit date:    Vaping Use    Vaping status: Never Used   Substance and Sexual Activity    Alcohol use: No     Comment: 0    Drug use: No    Sexual activity: Defer        Allergies   Allergen Reactions    Flagyl [Metronidazole] Hives    Atorvastatin Other (See Comments) and Myalgia      - LIPITOR -   Muscle cramps    Ciprofloxacin Hives      - CIPRO -           Current Outpatient Medications:     acetaminophen (TYLENOL) 325 MG tablet, Take 1 tablet by mouth Every 6 (Six) Hours  As Needed for Mild Pain., Disp: , Rfl:     apixaban (ELIQUIS) 5 MG tablet tablet, Take 1 tablet by mouth 2 (Two) Times a Day., Disp: , Rfl:     aspirin 81 MG EC tablet, Take 1 tablet by mouth Daily., Disp: , Rfl:     calcium polycarbophil (FIBERCON) 625 MG tablet, Take 1 tablet by mouth Daily., Disp: , Rfl:     docusate sodium (COLACE) 100 MG capsule, Take 1 capsule by mouth Daily As Needed for Constipation., Disp: , Rfl:     empagliflozin (JARDIANCE) 25 MG tablet tablet, Take 1 tablet by mouth Daily., Disp: , Rfl:     ezetimibe (ZETIA) 10 MG tablet, Take 1 tablet by mouth Daily., Disp: , Rfl:     fluticasone (FLONASE) 50 MCG/ACT nasal spray, Administer 2 sprays into the nostril(s) as directed by provider Daily., Disp: , Rfl:     furosemide (Lasix) 40 MG tablet, Take 1 tablet by mouth Daily As Needed (take as needed for weight gain more than 2 pounds in a day or more than than 3 pounds in 2 days.)., Disp: 90 tablet, Rfl: 3    gabapentin (NEURONTIN) 300 MG capsule, Take 2 capsules by mouth 2 (Two) Times a Day., Disp: , Rfl:     guaiFENesin (MUCINEX) 600 MG 12 hr tablet, Take 2 tablets by mouth Daily As Needed for Congestion., Disp: , Rfl:     insulin aspart (novoLOG FLEXPEN) 100 UNIT/ML solution pen-injector sc pen, Inject 50 Units under the skin into the appropriate area as directed 3 (Three) Times a Day With Meals. ADMINISTER 10 MINUTES BEFORE FOOD AS DIRECTED. REFRIGERATE UN-OPENED PENS. DISCARD CARTRIDGE 28 DAYS AFTER OPENING., Disp: , Rfl:     isosorbide mononitrate (IMDUR) 120 MG 24 hr tablet, Take 1 tablet by mouth Daily., Disp: 90 tablet, Rfl: 3    lamoTRIgine (LaMICtal) 200 MG tablet, Take 1 tablet by mouth 2 (Two) Times a Day., Disp: 180 tablet, Rfl: 3    levETIRAcetam (KEPPRA) 500 MG tablet, Take 3 tablets by mouth Every Morning., Disp: 270 tablet, Rfl: 3    levETIRAcetam (KEPPRA) 500 MG tablet, Take 4 tablets by mouth Every Night., Disp: 360 tablet, Rfl: 3    metFORMIN (GLUCOPHAGE) 1000 MG tablet, Take 1  "tablet by mouth 2 (Two) Times a Day With Meals., Disp: , Rfl:     metoprolol tartrate (LOPRESSOR) 100 MG tablet, Take 1 tablet by mouth 2 (Two) Times a Day., Disp: , Rfl:     Multiple Vitamin (MULTI VITAMIN PO), Take 1 tablet by mouth Daily., Disp: , Rfl:     nitroglycerin (NITROSTAT) 0.4 MG SL tablet, Place 1 tablet under the tongue Every 5 (Five) Minutes As Needed for Chest Pain. Take no more than 3 doses in 15 minutes., Disp: 25 tablet, Rfl: 2    pantoprazole (PROTONIX) 40 MG EC tablet, Take 1 tablet by mouth 2 (Two) Times a Day., Disp: , Rfl:     polyethylene glycol (MIRALAX) 17 g packet, Take 17 g by mouth Daily., Disp: , Rfl:     rimegepant sulfate ODT (Nurtec) 75 MG disintegrating tablet, Place 1 tablet under the tongue 1 (One) Time As Needed (at onset) for up to 180 days., Disp: 8 tablet, Rfl: 5    rosuvastatin (CRESTOR) 40 MG tablet, Take 0.5 tablets by mouth Every Night., Disp: , Rfl:     sacubitril-valsartan (Entresto)  MG tablet, Take 1 tablet by mouth 2 (Two) Times a Day., Disp: , Rfl:     tamsulosin (FLOMAX) 0.4 MG capsule 24 hr capsule, Take 1 capsule by mouth Daily., Disp: , Rfl:     insulin glargine (LANTUS, SEMGLEE) 100 UNIT/ML injection, Inject 30 Units under the skin into the appropriate area as directed Daily. (Patient not taking: Reported on 6/23/2025), Disp: , Rfl:     traMADol (ULTRAM) 50 MG tablet, Take 1 tablet by mouth 4 (Four) Times a Day As Needed for Moderate Pain or Severe Pain (pain not relieved by Gabapentin). (Patient not taking: Reported on 6/23/2025), Disp: , Rfl:        Objective   Vital Signs:   /68   Pulse 57   Ht 182.9 cm (72\")   Wt 110 kg (243 lb)   SpO2 96%   BMI 32.96 kg/m²       Physical Exam  Vitals and nursing note reviewed.   Constitutional:       Appearance: Normal appearance.   HENT:      Head: Normocephalic.   Eyes:      General: Lids are normal.      Extraocular Movements: Extraocular movements intact.      Pupils: Pupils are equal, round, and " reactive to light.   Pulmonary:      Effort: Pulmonary effort is normal. No respiratory distress.   Skin:     General: Skin is warm and dry.   Neurological:      Mental Status: He is alert.      Deep Tendon Reflexes: Reflexes are normal and symmetric.   Psychiatric:         Speech: Speech normal.        Neurological Exam  Mental Status  Alert. Oriented to person, place, time and situation. Speech is normal. Language is fluent with no aphasia.    Cranial Nerves  CN II: Visual fields full to confrontation.  CN III, IV, VI: Extraocular movements intact bilaterally. Normal lids and orbits bilaterally. Pupils equal round and reactive to light bilaterally.  CN V: Facial sensation is normal.  CN VII: Full and symmetric facial movement.  CN IX, X: Palate elevates symmetrically. Normal gag reflex.  CN XI: Shoulder shrug strength is normal.  CN XII: Tongue midline without atrophy or fasciculations.    Motor   Strength is 5/5 in all four extremities except as noted.                                             Right                     Left  Hip flexion                              5                          4+    Sensory  Light touch is normal in upper and lower extremities.     Reflexes  Deep tendon reflexes are 2+ and symmetric in all four extremities.    Gait  Casual gait is normal including stance, stride, and arm swing.      Result Review :   The following data was reviewed by: TEENA Leach on 06/23/2025:       Lab Requisition on 05/29/2025   Component Date Value Ref Range Status    Tissue Culture 05/29/2025 Rare growth Pseudomonas aeruginosa (A)   Final    Gram Stain 05/29/2025 Moderate (3+) Gram negative bacilli   Final    Gram Stain 05/29/2025 Few (2+) WBCs seen   Final    Anaerobic Culture 05/29/2025 No anaerobes isolated at 5 days   Final   No results displayed because visit has over 200 results.      Admission on 04/25/2025, Discharged on 04/26/2025   Component Date Value Ref Range Status    QT Interval  04/25/2025 354  ms Final    QTC Interval 04/25/2025 430  ms Final    Extra Tube 04/25/2025 Hold for add-ons.   Final    Auto resulted.    Extra Tube 04/25/2025 hold for add-on   Final    Auto resulted    Extra Tube 04/25/2025 Hold for add-ons.   Final    Auto resulted.    Extra Tube 04/25/2025 Hold for add-ons.   Final    Auto resulted.    Extra Tube 04/25/2025 Hold for add-ons.   Final    Auto resulted    ADENOVIRUS, PCR 04/26/2025 Not Detected  Not Detected Final    Coronavirus 229E 04/26/2025 Not Detected  Not Detected Final    Coronavirus HKU1 04/26/2025 Not Detected  Not Detected Final    Coronavirus NL63 04/26/2025 Not Detected  Not Detected Final    Coronavirus OC43 04/26/2025 Not Detected  Not Detected Final    COVID19 04/26/2025 Not Detected  Not Detected - Ref. Range Final    Human Metapneumovirus 04/26/2025 Not Detected  Not Detected Final    Human Rhinovirus/Enterovirus 04/26/2025 Not Detected  Not Detected Final    Influenza A PCR 04/26/2025 Not Detected  Not Detected Final    Influenza B PCR 04/26/2025 Not Detected  Not Detected Final    Parainfluenza Virus 1 04/26/2025 Not Detected  Not Detected Final    Parainfluenza Virus 2 04/26/2025 Not Detected  Not Detected Final    Parainfluenza Virus 3 04/26/2025 Not Detected  Not Detected Final    Parainfluenza Virus 4 04/26/2025 Not Detected  Not Detected Final    RSV, PCR 04/26/2025 Not Detected  Not Detected Final    Bordetella pertussis pcr 04/26/2025 Not Detected  Not Detected Final    Bordetella parapertussis PCR 04/26/2025 Not Detected  Not Detected Final    Chlamydophila pneumoniae PCR 04/26/2025 Not Detected  Not Detected Final    Mycoplasma pneumo by PCR 04/26/2025 Not Detected  Not Detected Final    Magnesium 04/25/2025 1.8  1.6 - 2.4 mg/dL Final    Phosphorus 04/25/2025 2.4 (L)  2.5 - 4.5 mg/dL Final    Color, UA 04/26/2025 Yellow  Yellow, Straw Final    Appearance, UA 04/26/2025 Clear  Clear Final    pH, UA 04/26/2025 7.0  5.0 - 8.0 Final     Specific Gravity, UA 04/26/2025 1.029  1.005 - 1.030 Final    Glucose, UA 04/26/2025 >=1000 mg/dL (3+) (A)  Negative Final    Ketones, UA 04/26/2025 Trace (A)  Negative Final    Bilirubin, UA 04/26/2025 Negative  Negative Final    Blood, UA 04/26/2025 Negative  Negative Final    Protein, UA 04/26/2025 100 mg/dL (2+) (A)  Negative Final    Leuk Esterase, UA 04/26/2025 Negative  Negative Final    Nitrite, UA 04/26/2025 Negative  Negative Final    Urobilinogen, UA 04/26/2025 1.0 E.U./dL  0.2 - 1.0 E.U./dL Final    THC, Screen, Urine 04/26/2025 Negative  Negative Final    Phencyclidine (PCP), Urine 04/26/2025 Negative  Negative Final    Cocaine Screen, Urine 04/26/2025 Negative  Negative Final    Methamphetamine, Ur 04/26/2025 Negative  Negative Final    Opiate Screen 04/26/2025 Positive (A)  Negative Final    Amphetamine Screen, Urine 04/26/2025 Negative  Negative Final    Benzodiazepine Screen, Urine 04/26/2025 Negative  Negative Final    Tricyclic Antidepressants Screen 04/26/2025 Negative  Negative Final    Methadone Screen, Urine 04/26/2025 Negative  Negative Final    Barbiturates Screen, Urine 04/26/2025 Negative  Negative Final    Oxycodone Screen, Urine 04/26/2025 Negative  Negative Final    Buprenorphine, Screen, Urine 04/26/2025 Negative  Negative Final    Ethanol % 04/25/2025 <0.010  % Final    Glucose 04/25/2025 209 (H)  65 - 99 mg/dL Final    BUN 04/25/2025 18  8 - 23 mg/dL Final    Creatinine 04/25/2025 1.10  0.76 - 1.27 mg/dL Final    Sodium 04/25/2025 137  136 - 145 mmol/L Final    Potassium 04/25/2025 5.0  3.5 - 5.2 mmol/L Final    Chloride 04/25/2025 102  98 - 107 mmol/L Final    CO2 04/25/2025 20.0 (L)  22.0 - 29.0 mmol/L Final    Calcium 04/25/2025 9.1  8.6 - 10.5 mg/dL Final    Total Protein 04/25/2025 7.1  6.0 - 8.5 g/dL Final    Albumin 04/25/2025 4.3  3.5 - 5.2 g/dL Final    ALT (SGPT) 04/25/2025 19  1 - 41 U/L Final    AST (SGOT) 04/25/2025 18  1 - 40 U/L Final    Alkaline Phosphatase 04/25/2025  90  39 - 117 U/L Final    Total Bilirubin 04/25/2025 1.2  0.0 - 1.2 mg/dL Final    Globulin 04/25/2025 2.8  gm/dL Final    A/G Ratio 04/25/2025 1.5  g/dL Final    BUN/Creatinine Ratio 04/25/2025 16.4  7.0 - 25.0 Final    Anion Gap 04/25/2025 15.0  5.0 - 15.0 mmol/L Final    eGFR 04/25/2025 73.6  >60.0 mL/min/1.73 Final    WBC 04/25/2025 3.70  3.40 - 10.80 10*3/mm3 Final    RBC 04/25/2025 4.88  4.14 - 5.80 10*6/mm3 Final    Hemoglobin 04/25/2025 14.8  13.0 - 17.7 g/dL Final    Hematocrit 04/25/2025 44.1  37.5 - 51.0 % Final    MCV 04/25/2025 90.4  79.0 - 97.0 fL Final    MCH 04/25/2025 30.3  26.6 - 33.0 pg Final    MCHC 04/25/2025 33.6  31.5 - 35.7 g/dL Final    RDW 04/25/2025 13.2  12.3 - 15.4 % Final    RDW-SD 04/25/2025 43.4  37.0 - 54.0 fl Final    MPV 04/25/2025 10.7  6.0 - 12.0 fL Final    Platelets 04/25/2025 105 (L)  140 - 450 10*3/mm3 Final    Neutrophil % 04/25/2025 86.8 (H)  42.7 - 76.0 % Final    Lymphocyte % 04/25/2025 8.6 (L)  19.6 - 45.3 % Final    Monocyte % 04/25/2025 1.4 (L)  5.0 - 12.0 % Final    Eosinophil % 04/25/2025 2.4  0.3 - 6.2 % Final    Basophil % 04/25/2025 0.5  0.0 - 1.5 % Final    Immature Grans % 04/25/2025 0.3  0.0 - 0.5 % Final    Neutrophils, Absolute 04/25/2025 3.21  1.70 - 7.00 10*3/mm3 Final    Lymphocytes, Absolute 04/25/2025 0.32 (L)  0.70 - 3.10 10*3/mm3 Final    Monocytes, Absolute 04/25/2025 0.05 (L)  0.10 - 0.90 10*3/mm3 Final    Eosinophils, Absolute 04/25/2025 0.09  0.00 - 0.40 10*3/mm3 Final    Basophils, Absolute 04/25/2025 0.02  0.00 - 0.20 10*3/mm3 Final    Immature Grans, Absolute 04/25/2025 0.01  0.00 - 0.05 10*3/mm3 Final    nRBC 04/25/2025 0.0  0.0 - 0.2 /100 WBC Final    Fentanyl, Urine 04/26/2025 Negative  Negative Final    RBC, UA 04/26/2025 0-2  None Seen, 0-2 /HPF Final    WBC, UA 04/26/2025 0-2  None Seen, 0-2 /HPF Final    Urine culture not indicated.    Bacteria, UA 04/26/2025 None Seen  None Seen /HPF Final    Squamous Epithelial Cells, UA 04/26/2025 0-2   None Seen, 0-2 /HPF Final    Hyaline Casts, UA 04/26/2025 None Seen  None Seen /LPF Final    Methodology 04/26/2025 Automated Microscopy   Final   Hospital Outpatient Visit on 04/09/2025   Component Date Value Ref Range Status    Creatinine 04/09/2025 1.00  0.60 - 1.30 mg/dL Final    Serial Number: [id number]:  [id number]     MRI Brain With & Without Contrast (04/28/2025 08:43)     Study Result    Narrative & Impression   MRI BRAIN W WO CONTRAST- 4/28/2025 6:20 AM     HISTORY: recurrent breakthrough seizures; G40.909-Epilepsy, unspecified,  not intractable, without status epilepticus     TECHNIQUE: Multisequence, multiplanar MRI of the brain before and after  the intravenous administration of Vueway contrast.     COMPARISON: Brain MRI dated 12/1/2017     FINDINGS:     No diffusion signal abnormality. No intra-axial or extra-axial  hemorrhage. No intracranial mass lesion or pathologic enhancement.  Moderate chronic small vessel ischemic change. Hippocampal volumes  appear symmetric. No obvious hippocampal sclerosis. Incidental tiny  hippocampal cysts. Posterior fossa structures are unremarkable.  Pituitary gland and sella are unremarkable. The major intracranial  flow-voids are preserved. Orbital contents are unremarkable. The  paranasal sinuses are clear. Partial right mastoid effusion. Normal  marrow signal in the skull base and calvarium.     IMPRESSION:     1.  No acute intracranial process.  2.  No intracranial mass lesion or pathologic enhancement.  3.  No obvious hippocampal sclerosis. Hippocampal volumes and signal  appear symmetric.  4.  Underlying moderate chronic small vessel ischemic change.     This report was signed and finalized on 4/28/2025 9:12 AM by Dr Merritt Reaves.     EEG (04/27/2025 13:28)     Reason for referral:     65 y.o.male with seizure     Technical Summary:      A 19 channel digital EEG was performed using the international 10-20 placement system, including eye leads and EKG leads.      Duration: 22 minutes     Findings:     The patient is asleep at the beginning of the study.  The background shows diffuse low amplitude theta present symmetrically over both hemispheres.  Toward the end of the study when the patient awakened there is an increase in faster theta and alpha frequencies.  A clear posterior rhythm is not seen.  Photic stimulation does not change the background.  Hyperventilation is not performed.  No focal features or epileptiform activity are present.     Video: Available     Technical quality: Good     Rhythm strip: Regular, 60 bpm     SUMMARY:     Excessive drowsiness     Otherwise unremarkable EEG in the mostly asleep and briefly awake states     No focal features or epileptiform activity are seen     IMPRESSION:     Normal study     This report is transcribed using the Dragon dictation system.                Signed    Electronically signed by Mikey Cheung MD on 4/27/25 at 1427 CDT         Progress Notes by Komal Herzog APRN (04/09/2025 08:30)  Consults by Usha Hammer APRN (04/27/2025 09:18)  Progress Notes by Valentino Kruger MD (04/28/2025 11:37)  Progress Notes by Usha Hammer APRN (04/29/2025 12:23)               Impression:  Carlos A Boyer Jr. is a 65 y.o. male who presents for follow-up of seizure, migraine and neuropathy.  Overall patient stable.  His episode in April likely provoked due to infection of foot.  He reports being compliant with medications.  With compliance and having a provoked attack, increasing his medication doses was not warranted.  We will continue current dosing.  Migraines and neuropathy under control.  He does have a history of stroke and can continue secondary stroke prevention through primary care.  Recommendations for monitoring will be outlined below.    Diagnoses and all orders for this visit:    1. Seizure disorder (Primary)    2. Migraine without aura and without status migrainosus, not intractable    3.  Polyneuropathy associated with underlying disease    4. History of ischemic stroke    5. Chronic anticoagulation    6. Paroxysmal atrial fibrillation    7. Hyperlipidemia LDL goal <70    8. Primary hypertension    9. Obstructive sleep apnea        Plan:  Continue aspirin 81 mg daily  Continue apixaban 5 mg twice daily due to history of paroxysmal atrial fibrillation  Continue rosuvastatin 40 mg tablet, half a tablet nightly  Continue levetiracetam 1500 mg in the morning and 2000 mg at night  Continue lamotrigine 200 mg tablet, 1 tablet twice daily  Continue gabapentin 300 mg capsule nightly per primary care for neuropathy  LDL goal less than 70  A1c goal less than 6.5  BP goal less than 130/80  Routine seizure precautions to include  No driving until July 24, 2025  No tub baths  No use of swimming pools or hot tubs unattended  No use of power tools or power equipment  No working at heights or climbing ladders  No use of open flames or heating sources  No use of sharp instruments  Return to the emergency room immediately for any new stroke symptoms  Follow-up with PCP as scheduled  Follow-up in my clinic 3 months for reevaluation for seizures    The patient and I have discussed the plan of care and he is in full agreement at this time.     Follow Up   Return in about 3 months (around 9/23/2025).            TEENA Leach  06/23/25  09:57 CDT

## 2025-06-29 ENCOUNTER — HOSPITAL ENCOUNTER (EMERGENCY)
Facility: HOSPITAL | Age: 65
Discharge: HOME | End: 2025-06-29
Attending: FAMILY MEDICINE | Admitting: FAMILY MEDICINE
Payer: OTHER GOVERNMENT

## 2025-06-29 VITALS
SYSTOLIC BLOOD PRESSURE: 121 MMHG | OXYGEN SATURATION: 97 % | RESPIRATION RATE: 16 BRPM | HEIGHT: 72 IN | DIASTOLIC BLOOD PRESSURE: 65 MMHG | BODY MASS INDEX: 32.1 KG/M2 | WEIGHT: 237 LBS | TEMPERATURE: 97.6 F | HEART RATE: 60 BPM

## 2025-06-29 DIAGNOSIS — B35.1 ONYCHOMYCOSIS WITH INGROWN TOENAIL: Primary | ICD-10-CM

## 2025-06-29 DIAGNOSIS — L08.9 TOE INFECTION: ICD-10-CM

## 2025-06-29 DIAGNOSIS — L60.0 ONYCHOMYCOSIS WITH INGROWN TOENAIL: Primary | ICD-10-CM

## 2025-06-29 PROCEDURE — 99283 EMERGENCY DEPT VISIT LOW MDM: CPT | Performed by: FAMILY MEDICINE

## 2025-06-29 RX ORDER — SULFAMETHOXAZOLE AND TRIMETHOPRIM 800; 160 MG/1; MG/1
1 TABLET ORAL ONCE
Status: COMPLETED | OUTPATIENT
Start: 2025-06-29 | End: 2025-06-29

## 2025-06-29 RX ORDER — SULFAMETHOXAZOLE AND TRIMETHOPRIM 800; 160 MG/1; MG/1
1 TABLET ORAL 2 TIMES DAILY
Qty: 14 TABLET | Refills: 0 | Status: SHIPPED | OUTPATIENT
Start: 2025-06-29 | End: 2025-07-06

## 2025-06-29 RX ADMIN — SULFAMETHOXAZOLE AND TRIMETHOPRIM 1 TABLET: 800; 160 TABLET ORAL at 11:23

## 2025-06-29 NOTE — ED PROVIDER NOTES
HPI:    Patient is a 65-year-old white man who presents to the emergency room with a complaint of right great toe pain and bleeding from under the nail.  He had his second toe on the right foot amputated by Dr. Villa about 3 months ago.  This was because of gangrene.  He is concerned that the great toe is becoming infected.  He had a pin in his great toe at the same time and second toe was amputated.  He states he has diabetes and is concerned and wanted to make sure that it does not become gangrene.  There is no fevers chills or night sweats no nausea vomiting diarrhea.    REVIEW OF SYSTEMS  CONSTITUTIONAL:  No complaints of fever, chills,or weakness  EYES:  No complaints of discharge   ENT: No complaints of sore throat or ear pain  CARDIOVASCULAR:  No complaints of chest pain, palpitations, or swelling  RESPIRATORY:  No complaints of cough or shortness of breath  GI:  No complaints of abdominal pain, nausea, vomiting, or diarrhea  MUSCULOSKELETAL: Positive for right great toe pain SKIN: Positive for bleeding from right great toenail  NEUROLOGIC:  No complaints of headache, focal weakness, or sensory changes  ENDOCRINE:  No complaints of polyuria or polydipsia  LYMPHATIC:  No complaints of swollen glands  GENITOURINARY: No complaints of urinary frequency or hematuria        PAST MEDICAL HISTORY  Past Medical History:   Diagnosis Date    Arthritis     Asthma     Cancer     skin    Carotid disease, bilateral     Cellulitis     right foot - on antibiotics 6-    Chest pain     Chronic diastolic congestive heart failure 09/07/2019    Chronic sinusitis     [place]ib[place] bullosa     Coronary artery disease involving native coronary artery of native heart with unstable angina pectoris 01/17/2017    Deviated septum     Diabetes mellitus     Difficulty urinating     Diverticulitis     Enlarged prostate     Fatty liver     GERD (gastroesophageal reflux disease)     [place] (hard of hearing)     Does have hearing aids     Hyperlipidemia LDL goal <70 2017    Hypertrophy of nasal turbinates     Keratoderma     Kidney stone     Lung nodules     lower right lung    Migraine     Murmur, heart     Myocardial infarction     Obesity     Paroxysmal atrial fibrillation 2019    Personal history of COVID-19 2021    PONV (postoperative nausea and vomiting)     Primary hypertension 10/16/2016    Psoriasis     Seizures     Sinus congestion     Skin cancer     Sleep apnea     not using cpap    SOB (shortness of breath)     Stroke     mild weakness on right side    UTI (urinary tract infection)        FAMILY HISTORY  Family History   Problem Relation Age of Onset    Heart disease Father     COPD Mother     Hypertension Mother     Asthma Mother     No Known Problems Sister     Colon cancer Paternal Uncle     Prostate cancer Maternal Grandfather     No Known Problems Sister     Colon cancer Maternal Grandmother     Colon polyps Maternal Grandmother        SOCIAL HISTORY  Social History     Socioeconomic History    Marital status:    Tobacco Use    Smoking status: Former     Current packs/day: 0.00     Average packs/day: 1.5 packs/day for 18.0 years (27.0 ttl pk-yrs)     Types: Cigarettes, Cigars     Start date:      Quit date:      Years since quittin.5     Passive exposure: Past    Smokeless tobacco: Former     Types: Chew     Quit date:    Vaping Use    Vaping status: Never Used   Substance and Sexual Activity    Alcohol use: No     Comment: 0    Drug use: No    Sexual activity: Defer       IMMUNIZATION HISTORY  Deferred to primary care physician.    SURGICAL HISTORY  Past Surgical History:   Procedure Laterality Date    AMPUTATION DIGIT Right 2025    Procedure: Partial vs Complete Amputation of 2nd Toe, Hardware Removal from Hallux - Right Foot;  Surgeon: Hernan Villa DPM;  Location: [facility] OR;  Service: Podiatry;  Laterality: Right;    CARDIAC CATHETERIZATION  2016    Dr. Broadbent; widely patent  previously placed stents in the left anterior descending and obstructive disease involving the diagonal branch which was treated medically    CARDIAC CATHETERIZATION N/A 07/14/2017    Procedure: Left Heart Cath;  Surgeon: Wade Ramey MD;  Location:  [facility];  Service:     CARDIAC CATHETERIZATION Left 10/15/2018    Procedure: Cardiac Catheterization/Vascular Study;  Surgeon: Wade Ramey MD;  Location:  [facility];  Service: Cardiology    CARDIAC CATHETERIZATION  10/15/2018    Procedure: Functional Flow [place];  Surgeon: Wade Ramey MD;  Location:  [facility];  Service: Cardiology    CARDIAC CATHETERIZATION N/A 10/15/2018    Procedure: Left ventriculography;  Surgeon: Wade Ramey MD;  Location:  [facility];  Service: Cardiology    CARDIAC CATHETERIZATION Left 06/26/2019    Procedure: Cardiac Catheterization/Vascular Study VEL OK  HE WILL WAIT 1 YEAR FOR SHOULDER SURGERY ;  Surgeon: Wade Ramey MD;  Location:  [facility];  Service: Cardiology    CARDIAC CATHETERIZATION Left 04/30/2021    Procedure: Coronary angiography;  Surgeon: Sahil Llamas MD;  Location:  [facility];  Service: Cardiology;  Laterality: Left;    CARDIAC CATHETERIZATION N/A 04/30/2021    Procedure: Percutaneous Coronary Intervention;  Surgeon: Sahil Llamas MD;  Location:  [facility];  Service: Cardiology;  Laterality: N/A;    CARDIAC CATHETERIZATION N/A 11/09/2022    Procedure: Left Heart Cath with SVGs;  Surgeon: Wade Ramey MD;  Location:  [facility];  Service: Cardiology;  Laterality: N/A;    CARPAL TUNNEL RELEASE      January 2024 and pther hand February 2024    CHOLECYSTECTOMY      CHOLECYSTECTOMY WITH INTRAOPERATIVE CHOLANGIOGRAM N/A 08/01/2018    Procedure: CHOLECYSTECTOMY LAPAROSCOPIC INTRAOPERATIVE CHOLANGIOGRAM;  Surgeon: Shane Ann MD;  Location: [facility] OR;  Service:  General    COLONOSCOPY N/A 07/14/2020    Procedure: COLONOSCOPY WITH ANESTHESIA;  Surgeon: Anupma Morales DO;  Location: [facility] ENDOSCOPY;  Service: Gastroenterology;  Laterality: N/A;  pre: abdominal pain  post: diverticulosis  Vinayak Correia PA    CORONARY ANGIOPLASTY      CORONARY ARTERY BYPASS GRAFT N/A 07/06/2019    Procedure: CABG X2 WITH LIMA, LEFT LEG OVH, AND PLACEMENT OF LEFT FEMORAL ARTERIAL LINE;  Surgeon: Steven Tang MD;  Location:  [facility] OR;  Service: Cardiothoracic    CORONARY STENT PLACEMENT      x 6    CYSTOSCOPY TRANSURETHRAL RESECTION OF PROSTATE N/A 01/23/2023    Procedure: CYSTOSCOPY TRANSURETHRAL RESECTION OF PROSTATE;  Surgeon: Latrell Pope MD;  Location:  [facility] OR;  Service: Urology;  Laterality: N/A;    ENDOSCOPIC FUNCTIONAL SINUS SURGERY (FESS) Bilateral 12/13/2017    Procedure: PROCEDURE PERFORMED:  Bilateral functional endoscopic anterior ethmoidectomy with bilateral middle meatal antrostomy Septoplasty Right kathia bullosa resection Bilateral inferior turbinate reduction via [place];  Surgeon: Mayank Ibarra MD;  Location: [facility] OR;  Service:     ENDOSCOPY N/A 07/30/2018    Procedure: ESOPHAGOGASTRODUODENOSCOPY WITH ANESTHESIA;  Surgeon: Benitez Mas MD;  Location: [facility] ENDOSCOPY;  Service: Gastroenterology    ENDOSCOPY N/A 07/14/2020    Procedure: ESOPHAGOGASTRODUODENOSCOPY WITH ANESTHESIA;  Surgeon: Anupam Morales DO;  Location: [facility] ENDOSCOPY;  Service: Gastroenterology;  Laterality: N/A;  pre: abdominal pain  post: esophagitis  Vinayak Correia PA    HARDWARE REMOVAL Right 4/30/2025    Procedure: Hardware Removal from Hallux - Right Foot;  Surgeon: Hernan Villa DPM;  Location:  [facility] OR;  Service: Podiatry;  Laterality: Right;    HERNIA REPAIR      x2 inguinal area    KIDNEY STONE SURGERY      KNEE ARTHROSCOPY Right 03/01/2022    Procedure: RIGHT KNEE PARTIAL LATERAL MENISCECTOMY;  Surgeon: Pedro Pablo Song MD;  Location:   [facility] OR;  Service: Orthopedics;  Laterality: Right;    KNEE SURGERY Right     OTHER SURGICAL HISTORY      urolift    PROSTATE SURGERY      Dr. Badillo - 2017    PULMONARY ARTERY PRESSURE SENSOR IMPLANT N/A 05/08/2023    Procedure: PA Pressure Sensor Implant;  Surgeon: Wade Ramey MD;  Location:  [facility];  Service: Cardiology;  Laterality: N/A;    ROTATOR CUFF REPAIR Right     SLEEP ENDOSCOPY N/A 02/27/2023    Procedure: Videosleep endoscopy;  Surgeon: Mayank Ibarra MD;  Location:  [facility] OR;  Service: ENT;  Laterality: N/A;    THUMB AMPUTATION Left     partial    TOE FUSION Right 7/3/2024    Procedure: Hallux Interphalangeal Joint Arthrodesis, Bone Graft [place] - Right Foot;  Surgeon: Hernan Villa DPM;  Location:  [facility] OR;  Service: Podiatry;  Laterality: Right;    TOE SURGERY      great toe       CURRENT MEDICATIONS    Current Facility-Administered Medications:     sulfamethoxazole-trimethoprim (BACTRIM DS,SEPTRA DS) 800-160 MG per tablet 1 tablet, 1 tablet, Oral, Once, Andriy Lopez Jr., MD    Current Outpatient Medications:     acetaminophen (TYLENOL) 325 MG tablet, Take 1 tablet by mouth Every 6 (Six) Hours As Needed for Mild Pain., Disp: , Rfl:     apixaban (ELIQUIS) 5 MG tablet tablet, Take 1 tablet by mouth 2 (Two) Times a Day., Disp: , Rfl:     aspirin 81 MG EC tablet, Take 1 tablet by mouth Daily., Disp: , Rfl:     calcium polycarbophil (FIBERCON) 625 MG tablet, Take 1 tablet by mouth Daily., Disp: , Rfl:     docusate sodium (COLACE) 100 MG capsule, Take 1 capsule by mouth Daily As Needed for Constipation., Disp: , Rfl:     empagliflozin (JARDIANCE) 25 MG tablet tablet, Take 1 tablet by mouth Daily., Disp: , Rfl:     ezetimibe (ZETIA) 10 MG tablet, Take 1 tablet by mouth Daily., Disp: , Rfl:     fluticasone (FLONASE) 50 MCG/ACT nasal spray, Administer 2 sprays into the nostril(s) as directed by provider Daily., Disp: , Rfl:     furosemide (Lasix) 40 MG tablet, Take 1  tablet by mouth Daily As Needed (take as needed for weight gain more than 2 pounds in a day or more than than 3 pounds in 2 days.)., Disp: 90 tablet, Rfl: 3    gabapentin (NEURONTIN) 300 MG capsule, Take 2 capsules by mouth 2 (Two) Times a Day., Disp: , Rfl:     guaiFENesin (MUCINEX) 600 MG 12 hr tablet, Take 2 tablets by mouth Daily As Needed for Congestion., Disp: , Rfl:     insulin aspart (novoLOG FLEXPEN) 100 UNIT/ML solution pen-injector sc pen, Inject 50 Units under the skin into the appropriate area as directed 3 (Three) Times a Day With Meals. ADMINISTER 10 MINUTES BEFORE FOOD AS DIRECTED. REFRIGERATE UN-OPENED PENS. DISCARD CARTRIDGE 28 DAYS AFTER OPENING., Disp: , Rfl:     insulin glargine (LANTUS, SEMGLEE) 100 UNIT/ML injection, Inject 30 Units under the skin into the appropriate area as directed Daily. (Patient not taking: Reported on 6/23/2025), Disp: , Rfl:     isosorbide mononitrate (IMDUR) 120 MG 24 hr tablet, Take 1 tablet by mouth Daily., Disp: 90 tablet, Rfl: 3    lamoTRIgine (LaMICtal) 200 MG tablet, Take 1 tablet by mouth 2 (Two) Times a Day., Disp: 180 tablet, Rfl: 3    levETIRAcetam (KEPPRA) 500 MG tablet, Take 3 tablets by mouth Every Morning., Disp: 270 tablet, Rfl: 3    levETIRAcetam (KEPPRA) 500 MG tablet, Take 4 tablets by mouth Every Night., Disp: 360 tablet, Rfl: 3    metFORMIN (GLUCOPHAGE) 1000 MG tablet, Take 1 tablet by mouth 2 (Two) Times a Day With Meals., Disp: , Rfl:     metoprolol tartrate (LOPRESSOR) 100 MG tablet, Take 1 tablet by mouth 2 (Two) Times a Day., Disp: , Rfl:     Multiple Vitamin (MULTI VITAMIN PO), Take 1 tablet by mouth Daily., Disp: , Rfl:     nitroglycerin (NITROSTAT) 0.4 MG SL tablet, Place 1 tablet under the tongue Every 5 (Five) Minutes As Needed for Chest Pain. Take no more than 3 doses in 15 minutes., Disp: 25 tablet, Rfl: 2    pantoprazole (PROTONIX) 40 MG EC tablet, Take 1 tablet by mouth 2 (Two) Times a Day., Disp: , Rfl:     polyethylene glycol  "(MIRALAX) 17 g packet, Take 17 g by mouth Daily., Disp: , Rfl:     rimegepant sulfate ODT (Nurtec) 75 MG disintegrating tablet, Place 1 tablet under the tongue 1 (One) Time As Needed (at onset) for up to 180 days., Disp: 8 tablet, Rfl: 5    rosuvastatin (CRESTOR) 40 MG tablet, Take 0.5 tablets by mouth Every Night., Disp: , Rfl:     sacubitril-valsartan (Entresto)  MG tablet, Take 1 tablet by mouth 2 (Two) Times a Day., Disp: , Rfl:     sulfamethoxazole-trimethoprim (BACTRIM DS,SEPTRA DS) 800-160 MG per tablet, Take 1 tablet by mouth 2 (Two) Times a Day for 7 days., Disp: 14 tablet, Rfl: 0    tamsulosin (FLOMAX) 0.4 MG capsule 24 hr capsule, Take 1 capsule by mouth Daily., Disp: , Rfl:     traMADol (ULTRAM) 50 MG tablet, Take 1 tablet by mouth 4 (Four) Times a Day As Needed for Moderate Pain or Severe Pain (pain not relieved by Gabapentin). (Patient not taking: Reported on 6/23/2025), Disp: , Rfl:     ALLERGIES  Allergies   Allergen Reactions    Flagyl [Metronidazole] Hives    Atorvastatin Other (See Comments) and Myalgia      - LIPITOR -   Muscle cramps    Ciprofloxacin Hives      - CIPRO -        Musculoskeletal exam    VITAL SIGNS:   /65   Pulse 60   Temp 97.6 °F (36.4 °C) (Oral)   Resp 16   Ht 182.9 cm (72\")   Wt 108 kg (237 lb)   SpO2 97%   BMI 32.14 kg/m²     Constitutional: Patient is alert and in no distress.  Patient with mild right great toe discomfort.    Cardiovascular: S1-S2 regular rate and rhythm. No murmurs, rubs or gallops are noted.    Respiratory: Patient is clear to auscultation bilaterally with no wheezing or rhonchi.  Chest wall is nontender.  There are no external lesions on the chest.  There is no crepitance    Abdomen: Soft, nontender. Bowel sounds are normal in all 4 quadrants. There is no rebound or guarding noted.  There is no abdominal distention or hepatosplenomegaly.    Musculoskeletal/integumentary: Right great toe: Cap refill is intact.  There is some bleeding " around the great toenail itself.  There is no increased warmth or drainage noted.  There are no ulcers noted between the toes.    RADIOLOGY/PROCEDURES        No orders to display          FUTURE APPOINTMENTS     Future Appointments   Date Time Provider Department Center   7/23/2025 11:00 AM Cristel Driscoll APRN [facility] POD [facility]   9/24/2025  9:30 AM Komal Herzog APRN [facility]   10/8/2025 10:00 AM Komal Herzog APRN [facility]   12/17/2025  2:00 PM Ben Burden APRN [facility] CD  [facility]   4/15/2026  1:00 PM Skye Olson APRN [facility] CTS  [facility]              COURSE & MEDICAL DECISION MAKING    Patient's partial differential diagnosis can include:    Right great toe onychomycosis, multi toe onychomycosis, ingrowing right great toe, early cellulitis of right great toe and other    No true infection that I can see at this time however with his risk factors and the fact that he had some recent bleeding from a brother that he right great toenail will cover him for antibiotics.  He really needs to follow-up with his podiatrist Dr. Villa for treatment of his onychomycosis and toenail trimming.  Will give him Bactrim DS here and a prescription for that             Patient's level of risk: Moderate      CRITICAL CARE    CRITICAL CARE: No    CRITICAL CARE TIME: None      No results found for this or any previous visit (from the past 24 hours).        Patient was hemodynamically and neurologically stable in the ED.   Pertinent studies were reviewed as above.     The patient received:  Medications   sulfamethoxazole-trimethoprim (BACTRIM DS,SEPTRA DS) 800-160 MG per tablet 1 tablet (has no administration in time range)            ED Disposition       ED Disposition   Discharge    Condition   Stable    Comment   --                 Dragon disclaimer:  Part of this note may be an electronic transcription/translation of spoken language to printed text using the Dragon Dictation System.     I have reviewed the  patient’s prescription history via a prescription monitoring program.  This information is consistent with my knowledge of the patient’s controlled substance use history.    Patient evaluated during Coronavirus Pandemic. Isolation practices followed according to [facility] policy.    FINAL IMPRESSION   Diagnosis Plan   1. Onychomycosis with ingrown toenail      Multiple toes      2. Toe infection              MD John Carr Jr, Thomas Mark Jr., MD  06/29/25 1113

## 2025-06-29 NOTE — DISCHARGE INSTRUCTIONS
Call to get a sooner appointment with Dr. Villa would recommend that he also treat your onychomycosis which is your fungal toenail infection.  Discussed with you the need to have multiple shoes and allow them to air out and spraying and treat them for athlete's foot and fungus.

## 2025-06-30 ENCOUNTER — TELEPHONE (OUTPATIENT)
Age: 65
End: 2025-06-30
Payer: OTHER GOVERNMENT

## 2025-06-30 DIAGNOSIS — Z79.4 TYPE 2 DIABETES MELLITUS WITH DIABETIC POLYNEUROPATHY, WITH LONG-TERM CURRENT USE OF INSULIN: Primary | ICD-10-CM

## 2025-06-30 DIAGNOSIS — E11.42 TYPE 2 DIABETES MELLITUS WITH DIABETIC POLYNEUROPATHY, WITH LONG-TERM CURRENT USE OF INSULIN: Primary | ICD-10-CM

## 2025-06-30 DIAGNOSIS — L60.0 INGROWN NAIL OF GREAT TOE: ICD-10-CM

## 2025-06-30 NOTE — TELEPHONE ENCOUNTER
Called patient and made an appointment for patient with dr gonsalves and asked him to have xrays prior due to medical history

## 2025-06-30 NOTE — TELEPHONE ENCOUNTER
HUB AGENT ATTEMPTED CLINICAL WARM TRANSFER - NO ANSWER     Caller: Carlos A Boyer Jr. / PATIENT     Best call back number: [phone]     PATIENT HAD SURGERIES BY DR POWERS 07-03-24 & 04-30-25 FOR RIGHT FOOT, 2nd TOE AMPUTATION    PATIENT SEEN AT [facility] YESTERDAY 6/29 DUE TO BLEEDING FROM RIGHT GREAT TOE     PLEASE CALL TO DISCUSS - NEXT APPT 7/23 WITH KIKA MURPHY     THANKS

## 2025-06-30 NOTE — PROGRESS NOTES
[facility] - PODIATRY    Today's Date: 07/01/2025     Patient Name: Carlos A Boyer Jr.  MRN: [id number]  [facility]: [id number]  PCP: Vinayak Correia PA  Referring Provider: Vinayak Correia PA    SUBJECTIVE     Chief Complaint   Patient presents with    Follow-up     Vinayak Correia PA-05/20/2025-ingrown toe nail possible infection rt great toe/xrays prior-toe nail is lifted off the bed of the nail denies pain due to numbness of foot    Diabetes     142 mg/dl BG     HPI: Carlos A Boyer Jr., a 65 y.o.male, comes to clinic as a(n) established patient complaining of ingrown toenail. Patient has h/o arthritis, asthma, skin cancer, cellulitis, sinusitis, diabetes mellitus, coronary artery deficiencies, enlarged prostate, fatty liver, GERD, hyperlipidemia, atrial fibrillation, shortness of breath, stroke. Patient reports ingrown nail to his right great toe for the past 2 days. Denies injury to the toe. He went to the ED for eval and was given abx and told to f/u in podiatry. Notes mild redness and irritation.   Patient is NIDDM with last stated BG level of 142mg/dl.  Denies pain. Relates previous treatment(s) including podiatry care, right hallux arthrodesis. Denies any constitutional symptoms. No other pedal complaints at this time.    Past Medical History:   Diagnosis Date    Arthritis     Asthma     Cancer     skin    Carotid disease, bilateral     Cellulitis     right foot - on antibiotics 6-    Chest pain     Chronic diastolic congestive heart failure 09/07/2019    Chronic sinusitis     Maribell bullosa     Coronary artery disease involving native coronary artery of native heart with unstable angina pectoris 01/17/2017    Deviated septum     Diabetes mellitus     Difficulty urinating     Diverticulitis     Enlarged prostate     Fatty liver     GERD (gastroesophageal reflux disease)     [place] (hard of hearing)     Does have hearing aids    Hyperlipidemia LDL goal <70 02/02/2017     Hypertrophy of nasal turbinates     Keratoderma     Kidney stone     Lung nodules     lower right lung    Migraine     Murmur, heart     Myocardial infarction     Obesity     Paroxysmal atrial fibrillation 07/11/2019    Personal history of COVID-19 07/2021    PONV (postoperative nausea and vomiting)     Primary hypertension 10/16/2016    Psoriasis     Seizures     Sinus congestion     Skin cancer     Sleep apnea     not using cpap    SOB (shortness of breath)     Stroke     mild weakness on right side    UTI (urinary tract infection)      Past Surgical History:   Procedure Laterality Date    AMPUTATION DIGIT Right 4/30/2025    Procedure: Partial vs Complete Amputation of 2nd Toe, Hardware Removal from Hallux - Right Foot;  Surgeon: Hernan Villa DPM;  Location:  [facility] OR;  Service: Podiatry;  Laterality: Right;    CARDIAC CATHETERIZATION  01/2016    Dr. Broadbent; widely patent previously placed stents in the left anterior descending and obstructive disease involving the diagonal branch which was treated medically    CARDIAC CATHETERIZATION N/A 07/14/2017    Procedure: Left Heart Cath;  Surgeon: Wade Ramey MD;  Location:  [facility] CATH INVASIVE LOCATION;  Service:     CARDIAC CATHETERIZATION Left 10/15/2018    Procedure: Cardiac Catheterization/Vascular Study;  Surgeon: Wade Ramey MD;  Location:  [facility] CATH INVASIVE LOC[facility];  Service: Cardiology    CARDIAC CATHETERIZATION  10/15/2018    Procedure: Functional Flow [place];  Surgeon: Wade Ramey MD;  Location:  [facility] CATH INVASIVE [facility];  Service: Cardiology    CARDIAC CATHETERIZATION N/A 10/15/2018    Procedure: Left ventriculography;  Surgeon: Wade Ramey MD;  Location:  [facility] CATH INVASIVE [facility];  Service: Cardiology    CARDIAC CATHETERIZATION Left 06/26/2019    Procedure: Cardiac Catheterization/Vascular Study VEL OK  HE WILL WAIT 1 YEAR FOR SHOULDER SURGERY ;  Surgeon: Wade Ramey MD;  Location:  [facility] CATH INVASIVE [facility];  Service:  Cardiology    CARDIAC CATHETERIZATION Left 04/30/2021    Procedure: Coronary angiography;  Surgeon: Sahil Llamas MD;  Location:  [facility] CATH INVASIVE [facility];  Service: Cardiology;  Laterality: Left;    CARDIAC CATHETERIZATION N/A 04/30/2021    Procedure: Percutaneous Coronary Intervention;  Surgeon: Sahil Llamas MD;  Location:  [facility] CATH INVASIVE [facility];  Service: Cardiology;  Laterality: N/A;    CARDIAC CATHETERIZATION N/A 11/09/2022    Procedure: Left Heart Cath with SVGs;  Surgeon: Wade Ramey MD;  Location:  [facility] CATH [facility];  Service: Cardiology;  Laterality: N/A;    CARPAL TUNNEL RELEASE      January 2024 and pther hand February 2024    CHOLECYSTECTOMY      CHOLECYSTECTOMY WITH INTRAOPERATIVE CHOLANGIOGRAM N/A 08/01/2018    Procedure: CHOLECYSTECTOMY LAPAROSCOPIC INTRAOPERATIVE CHOLANGIOGRAM;  Surgeon: Shane Ann MD;  Location: [facility] OR;  Service: General    COLONOSCOPY N/A 07/14/2020    Procedure: COLONOSCOPY WITH ANESTHESIA;  Surgeon: Anupam Morales DO;  Location: [facility] ENDOSCOPY;  Service: Gastroenterology;  Laterality: N/A;  pre: abdominal pain  post: diverticulosis  Vinayak Correia PA    CORONARY ANGIOPLASTY      CORONARY ARTERY BYPASS GRAFT N/A 07/06/2019    Procedure: CABG X2 WITH LIMA, LEFT LEG OVH, AND PLACEMENT OF LEFT FEMORAL ARTERIAL LINE;  Surgeon: Steven Tang MD;  Location: [facility] OR;  Service: Cardiothoracic    CORONARY STENT PLACEMENT      x 6    CYSTOSCOPY TRANSURETHRAL RESECTION OF PROSTATE N/A 01/23/2023    Procedure: CYSTOSCOPY TRANSURETHRAL RESECTION OF PROSTATE;  Surgeon: Latrell Pope MD;  Location: [facility] OR;  Service: Urology;  Laterality: N/A;    ENDOSCOPIC FUNCTIONAL SINUS SURGERY (FESS) Bilateral 12/13/2017    Procedure: PROCEDURE PERFORMED:  Bilateral functional endoscopic anterior ethmoidectomy with bilateral middle meatal antrostomy Septoplasty Right kathia bullosa resection Bilateral inferior turbinate reduction via  Coblation;  Surgeon: Mayank Ibarra MD;  Location:  [facility] OR;  Service:     ENDOSCOPY N/A 07/30/2018    Procedure: ESOPHAGOGASTRODUODENOSCOPY WITH ANESTHESIA;  Surgeon: Benitez Mas MD;  Location:  [facility] ENDOSCOPY;  Service: Gastroenterology    ENDOSCOPY N/A 07/14/2020    Procedure: ESOPHAGOGASTRODUODENOSCOPY WITH ANESTHESIA;  Surgeon: Anupam Morales DO;  Location:  [facility] ENDOSCOPY;  Service: Gastroenterology;  Laterality: N/A;  pre: abdominal pain  post: esophagitis  Vinayak Correia, PA    HARDWARE REMOVAL Right 4/30/2025    Procedure: Hardware Removal from Hallux - Right Foot;  Surgeon: Hernan Villa DPM;  Location:  [facility] OR;  Service: Podiatry;  Laterality: Right;    HERNIA REPAIR      x2 inguinal area    KIDNEY STONE SURGERY      KNEE ARTHROSCOPY Right 03/01/2022    Procedure: RIGHT KNEE PARTIAL LATERAL MENISCECTOMY;  Surgeon: Pedro Pablo Song MD;  Location:  [facility] OR;  Service: Orthopedics;  Laterality: Right;    KNEE SURGERY Right     OTHER SURGICAL HISTORY      urolift    PROSTATE SURGERY      Dr. Badillo - 2017    PULMONARY ARTERY PRESSURE SENSOR IMPLANT N/A 05/08/2023    Procedure: PA Pressure Sensor Implant;  Surgeon: Wade Ramey MD;  Location: [facility] CATH INVASIVE LOC[facility];  Service: Cardiology;  Laterality: N/A;    ROTATOR CUFF REPAIR Right     SLEEP ENDOSCOPY N/A 02/27/2023    Procedure: Videosleep endoscopy;  Surgeon: Mayank Ibarra MD;  Location:  [facility] OR;  Service: ENT;  Laterality: N/A;    THUMB AMPUTATION Left     partial    TOE FUSION Right 7/3/2024    Procedure: Hallux Interphalangeal Joint Arthrodesis, Bone Graft [place] - Right Foot;  Surgeon: Hernan Villa DPM;  Location:  [facility] OR;  Service: Podiatry;  Laterality: Right;    TOE SURGERY      great toe     Family History   Problem Relation Age of Onset    Heart disease Father     COPD Mother     Hypertension Mother     Asthma Mother     No Known Problems Sister     Colon cancer Paternal Uncle      Prostate cancer Maternal Grandfather     No Known Problems Sister     Colon cancer Maternal Grandmother     Colon polyps Maternal Grandmother      Social History     Socioeconomic History    Marital status:    Tobacco Use    Smoking status: Former     Current packs/day: 0.00     Average packs/day: 1.5 packs/day for 18.0 years (27.0 ttl pk-yrs)     Types: Cigarettes, Cigars     Start date:      Quit date:      Years since quittin.5     Passive exposure: Past    Smokeless tobacco: Former     Types: Chew     Quit date:    Vaping Use    Vaping status: Never Used   Substance and Sexual Activity    Alcohol use: No     Comment: 0    Drug use: No    Sexual activity: Defer     Allergies   Allergen Reactions    Flagyl [Metronidazole] Hives    Atorvastatin Other (See Comments) and Myalgia      - LIPITOR -   Muscle cramps    Ciprofloxacin Hives      - CIPRO -      Current Outpatient Medications   Medication Sig Dispense Refill    acetaminophen (TYLENOL) 325 MG tablet Take 1 tablet by mouth Every 6 (Six) Hours As Needed for Mild Pain.      apixaban (ELIQUIS) 5 MG tablet tablet Take 1 tablet by mouth 2 (Two) Times a Day.      aspirin 81 MG EC tablet Take 1 tablet by mouth Daily.      calcium polycarbophil (FIBERCON) 625 MG tablet Take 1 tablet by mouth Daily.      docusate sodium (COLACE) 100 MG capsule Take 1 capsule by mouth Daily As Needed for Constipation.      empagliflozin (JARDIANCE) 25 MG tablet tablet Take 1 tablet by mouth Daily.      ezetimibe (ZETIA) 10 MG tablet Take 1 tablet by mouth Daily.      fluticasone (FLONASE) 50 MCG/ACT nasal spray Administer 2 sprays into the nostril(s) as directed by provider Daily.      furosemide (Lasix) 40 MG tablet Take 1 tablet by mouth Daily As Needed (take as needed for weight gain more than 2 pounds in a day or more than than 3 pounds in 2 days.). 90 tablet 3    gabapentin (NEURONTIN) 300 MG capsule Take 2 capsules by mouth 2 (Two) Times a Day.       guaiFENesin (MUCINEX) 600 MG 12 hr tablet Take 2 tablets by mouth Daily As Needed for Congestion.      insulin aspart (novoLOG FLEXPEN) 100 UNIT/ML solution pen-injector sc pen Inject 50 Units under the skin into the appropriate area as directed 3 (Three) Times a Day With Meals. ADMINISTER 10 MINUTES BEFORE FOOD AS DIRECTED. REFRIGERATE UN-OPENED PENS. DISCARD CARTRIDGE 28 DAYS AFTER OPENING.      insulin glargine (LANTUS, SEMGLEE) 100 UNIT/ML injection Inject 30 Units under the skin into the appropriate area as directed Daily.      isosorbide mononitrate (IMDUR) 120 MG 24 hr tablet Take 1 tablet by mouth Daily. 90 tablet 3    lamoTRIgine (LaMICtal) 200 MG tablet Take 1 tablet by mouth 2 (Two) Times a Day. 180 tablet 3    levETIRAcetam (KEPPRA) 500 MG tablet Take 3 tablets by mouth Every Morning. 270 tablet 3    levETIRAcetam (KEPPRA) 500 MG tablet Take 4 tablets by mouth Every Night. 360 tablet 3    metFORMIN (GLUCOPHAGE) 1000 MG tablet Take 1 tablet by mouth 2 (Two) Times a Day With Meals.      metoprolol tartrate (LOPRESSOR) 100 MG tablet Take 1 tablet by mouth 2 (Two) Times a Day.      Multiple Vitamin (MULTI VITAMIN PO) Take 1 tablet by mouth Daily.      nitroglycerin (NITROSTAT) 0.4 MG SL tablet Place 1 tablet under the tongue Every 5 (Five) Minutes As Needed for Chest Pain. Take no more than 3 doses in 15 minutes. 25 tablet 2    pantoprazole (PROTONIX) 40 MG EC tablet Take 1 tablet by mouth 2 (Two) Times a Day.      polyethylene glycol (MIRALAX) 17 g packet Take 17 g by mouth Daily.      rimegepant sulfate ODT (Nurtec) 75 MG disintegrating tablet Place 1 tablet under the tongue 1 (One) Time As Needed (at onset) for up to 180 days. 8 tablet 5    rosuvastatin (CRESTOR) 40 MG tablet Take 0.5 tablets by mouth Every Night.      sacubitril-valsartan (Entresto)  MG tablet Take 1 tablet by mouth 2 (Two) Times a Day.      sulfamethoxazole-trimethoprim (BACTRIM DS,SEPTRA DS) 800-160 MG per tablet Take 1 tablet by  mouth 2 (Two) Times a Day for 7 days. 14 tablet 0    tamsulosin (FLOMAX) 0.4 MG capsule 24 hr capsule Take 1 capsule by mouth Daily.      traMADol (ULTRAM) 50 MG tablet Take 1 tablet by mouth 4 (Four) Times a Day As Needed for Moderate Pain or Severe Pain (pain not relieved by Gabapentin).       No current facility-administered medications for this visit.     Review of Systems   Constitutional:  Negative for activity change.   HENT:  Negative for congestion.    Respiratory:  Negative for apnea and shortness of breath.    Gastrointestinal:  Negative for abdominal pain.   Musculoskeletal:  Positive for gait problem. Negative for arthralgias and back pain.   Neurological:  Positive for weakness and numbness.   Psychiatric/Behavioral:  Negative for agitation, behavioral problems and confusion.        OBJECTIVE     Vitals:    07/01/25 1305   BP: 110/60   Pulse: 60   SpO2: 94%       PHYSICAL EXAM  GEN:   Accompanied by none.     Foot/Ankle Exam    GENERAL  Appearance:  appears stated age  Orientation:  AAOx3  Affect:  appropriate  Gait:  unimpaired  Assistance:  independent  Right shoe gear: diabetic shoe  Left shoe gear: diabetic shoe    VASCULAR     Right Foot Vascularity   Dorsalis pedis:  2+  Posterior tibial:  2+  Skin temperature:  warm  Edema grading:  None  CFT:  3  Pedal hair growth:  Present  Varicosities:  none     Left Foot Vascularity   Dorsalis pedis:  2+  Posterior tibial:  2+  Skin temperature:  warm  Edema grading:  None  CFT:  3  Pedal hair growth:  Present  Varicosities:  none     NEUROLOGIC     Right Foot Neurologic   Light touch sensation: diminished  Vibratory sensation: diminished  Hot/Cold sensation: diminished     Left Foot Neurologic   Light touch sensation: diminished  Vibratory sensation: diminished  Hot/Cold sensation:  diminished    MUSCULOSKELETAL     Right Foot Musculoskeletal    Amputation   Toes amputated: second toe  Ecchymosis:  none  Tenderness:  none    Arch:  Normal     Left Foot  Musculoskeletal   Ecchymosis:  none  Tenderness:  none  Arch:  Normal    MUSCLE STRENGTH     Right Foot Muscle Strength   Foot dorsiflexion:  5  Foot plantar flexion:  5  Foot inversion:  5  Foot eversion:  5     Left Foot Muscle Strength   Foot dorsiflexion:  5  Foot plantar flexion:  5  Foot inversion:  5  Foot eversion:  5    RANGE OF MOTION     Right Foot Range of Motion   Foot and ankle ROM within normal limits       Left Foot Range of Motion   Foot and ankle ROM within normal limits      DERMATOLOGIC      Right Foot Dermatologic   Skin  Right foot skin is intact.   Nails  1.  Positive for ingrown toenail.     Left Foot Dermatologic   Skin  Left foot skin is intact.       RADIOLOGY/NUCLEAR:  XR Foot 3+ View Right  Result Date: 7/1/2025  Narrative: EXAMINATION:  XR FOOT 3+ VW RIGHT-   HISTORY: Right foot pain. Prior history of infection. E11.42-Type 2 diabetes mellitus with diabetic polyneuropathy; Z79.4-Long term (current) use of insulin; L60.0-Ingrowing nail.  COMPARISON: No comparison study.  TECHNIQUE: 3 views were obtained. Gleamer AI fracture analysis was utilized.  FINDINGS: There has been prior partial amputation of the second toe. There is very slight cortical irregularity of the right second toe proximal phalanx amputation stump. There is mild hallux valgus deformity. There is narrowing of the first MTP joint and the interphalangeal joint space of the great toe. There is a screw tract through the great toe. There is a small to moderate size plantar calcaneal spur. There are lucencies in the calcaneus that may be related to prior bone harvesting. There is posterior calcaneal enthesopathy that is mild.       Impression: 1. Prior second toe partial amputation. There is slight cortical irregularity of the distal proximal phalanx amputation stump. Early osteomyelitis in this area cannot be ruled out. 2. Degenerative narrowing of the first MTP joint and the interphalangeal joint of the great toe. Mild hallux  valgus. Screw tract within the great toe related to prior surgery. 3. Calcaneal spurring and enthesopathy. Lucency within the calcaneus probably related to prior bone harvesting. Correlate clinically.   This report was signed and finalized on 7/1/2025 1:12 PM by Dr. Wojciech Davidson MD.          LABORATORY/CULTURE RESULTS:      PATHOLOGY RESULTS:       ASSESSMENT/PLAN     Diagnoses and all orders for this visit:    1. Paronychia of great toe, right (Primary)  -     Nail Removal    2. Type 2 diabetes mellitus with diabetic polyneuropathy, with long-term current use of insulin          Comprehensive lower extremity examination and evaluation was performed.  Discussed findings and treatment plan including risks, benefits, and treatment options with patient in detail. Patient agreed with treatment plan.  Patient may maintain nails and calluses at home utilizing emery board or pumice stone between visits as needed  Reviewed at home diabetic foot care including daily foot checks   After verbal consent obtained, total/partial nail avulsion(s) performed as documented in procedure note. Post-procedure instructions given.   An After Visit Summary was printed and given to the patient at discharge, including (if requested) any available informative/educational handouts regarding diagnosis, treatment, or medications. All questions were answered to patient/family satisfaction. Should symptoms fail to improve or worsen they agree to call or return to clinic or to go to the Emergency Department. Discussed the importance of following up with any needed screening tests/labs/specialist appointments and any requested follow-up recommended by me today. Importance of maintaining follow-up discussed and patient accepts that missed appointments can delay diagnosis and potentially lead to worsening of conditions.  Return for Next scheduled follow up, Follow-up with Podiatry APRN., or sooner if acute issues arise.      Nail  Removal    Date/Time: 7/1/2025 1:29 PM    Performed by: Hernan Villa DPM  Authorized by: Hernan Villa DPM  Location: right foot  Location details: right big toe    Sedation:  Patient sedated: no    Preparation: skin prepped with alcohol  Amount removed: complete  Wedge excision of skin of nail fold: no  Nail bed sutured: no  Nail matrix removed: none  Removed nail replaced and anchored: no  Dressing: antibiotic ointment and dressing applied  Patient tolerance: patient tolerated the procedure well with no immediate complications           This document has been electronically signed by Hernan Villa DPM on July 1, 2025 13:27 CDT

## 2025-07-01 ENCOUNTER — OFFICE VISIT (OUTPATIENT)
Age: 65
End: 2025-07-01
Payer: OTHER GOVERNMENT

## 2025-07-01 ENCOUNTER — HOSPITAL ENCOUNTER (OUTPATIENT)
Dept: GENERAL RADIOLOGY | Facility: HOSPITAL | Age: 65
Discharge: HOME | End: 2025-07-01
Admitting: PODIATRIST
Payer: OTHER GOVERNMENT

## 2025-07-01 VITALS
WEIGHT: 237 LBS | SYSTOLIC BLOOD PRESSURE: 110 MMHG | HEART RATE: 60 BPM | OXYGEN SATURATION: 94 % | DIASTOLIC BLOOD PRESSURE: 60 MMHG | BODY MASS INDEX: 32.1 KG/M2 | HEIGHT: 72 IN

## 2025-07-01 DIAGNOSIS — Z79.4 TYPE 2 DIABETES MELLITUS WITH DIABETIC POLYNEUROPATHY, WITH LONG-TERM CURRENT USE OF INSULIN: ICD-10-CM

## 2025-07-01 DIAGNOSIS — E11.42 TYPE 2 DIABETES MELLITUS WITH DIABETIC POLYNEUROPATHY, WITH LONG-TERM CURRENT USE OF INSULIN: ICD-10-CM

## 2025-07-01 DIAGNOSIS — L60.0 INGROWN NAIL OF GREAT TOE: ICD-10-CM

## 2025-07-01 DIAGNOSIS — L03.031 PARONYCHIA OF GREAT TOE, RIGHT: Primary | ICD-10-CM

## 2025-07-01 PROCEDURE — 73630 X-RAY EXAM OF FOOT: CPT

## 2025-07-21 ENCOUNTER — OFFICE VISIT (OUTPATIENT)
Age: 65
End: 2025-07-21
Payer: OTHER GOVERNMENT

## 2025-07-21 ENCOUNTER — OFFICE VISIT (OUTPATIENT)
Dept: WOUND CARE | Facility: HOSPITAL | Age: 65
End: 2025-07-21
Payer: OTHER GOVERNMENT

## 2025-07-21 VITALS
OXYGEN SATURATION: 96 % | SYSTOLIC BLOOD PRESSURE: 138 MMHG | HEART RATE: 54 BPM | BODY MASS INDEX: 32.1 KG/M2 | WEIGHT: 237 LBS | DIASTOLIC BLOOD PRESSURE: 72 MMHG | HEIGHT: 72 IN

## 2025-07-21 DIAGNOSIS — Z89.421 STATUS POST AMPUTATION OF LESSER TOE OF RIGHT FOOT: ICD-10-CM

## 2025-07-21 DIAGNOSIS — E11.42 TYPE 2 DIABETES MELLITUS WITH DIABETIC POLYNEUROPATHY, WITH LONG-TERM CURRENT USE OF INSULIN: ICD-10-CM

## 2025-07-21 DIAGNOSIS — L97.511 DIABETIC ULCER OF RIGHT FOOT ASSOCIATED WITH TYPE 2 DIABETES MELLITUS, LIMITED TO BREAKDOWN OF SKIN, UNSPECIFIED PART OF FOOT: ICD-10-CM

## 2025-07-21 DIAGNOSIS — Z79.4 TYPE 2 DIABETES MELLITUS WITH DIABETIC POLYNEUROPATHY, WITH LONG-TERM CURRENT USE OF INSULIN: ICD-10-CM

## 2025-07-21 DIAGNOSIS — E11.621 DIABETIC ULCER OF RIGHT FOOT ASSOCIATED WITH TYPE 2 DIABETES MELLITUS, LIMITED TO BREAKDOWN OF SKIN, UNSPECIFIED PART OF FOOT: ICD-10-CM

## 2025-07-21 DIAGNOSIS — E11.9 ENCOUNTER FOR DIABETIC FOOT EXAM: ICD-10-CM

## 2025-07-21 DIAGNOSIS — L60.2 THICKENED NAILS: Primary | ICD-10-CM

## 2025-07-21 DIAGNOSIS — S91.209D AVULSION OF TOENAIL, SUBSEQUENT ENCOUNTER: ICD-10-CM

## 2025-07-21 DIAGNOSIS — L84 FOOT CALLUS: ICD-10-CM

## 2025-07-21 PROCEDURE — 11055 PARING/CUTG B9 HYPRKER LES 1: CPT | Performed by: NURSE PRACTITIONER

## 2025-07-21 PROCEDURE — 99213 OFFICE O/P EST LOW 20 MIN: CPT | Performed by: NURSE PRACTITIONER

## 2025-07-21 PROCEDURE — 11721 DEBRIDE NAIL 6 OR MORE: CPT | Performed by: NURSE PRACTITIONER

## 2025-07-21 PROCEDURE — G0463 HOSPITAL OUTPT CLINIC VISIT: HCPCS

## 2025-07-21 NOTE — PROGRESS NOTES
[facility] - PODIATRY    Today's Date: 07/21/2025     Patient Name: Carlos A Boyer Jr.  MRN: [id number]  CSN: [id number]  PCP: Vinayak Correia PA  Referring Provider: Vinayak Correia PA    SUBJECTIVE     Chief Complaint   Patient presents with    Follow-up     Vinayak Correia PA 04/15/25  2 months for Diabetic foot care clinic   Pt here for nail/foot care. Pt reports no pain or issues. Pt had a right great toenail removed with Dr. Villa on 7/1/25.      Diabetes     HPI: Carlos A Boyer Jr., a 65 y.o.male, comes to clinic as a(n) established patient complaining of toenail/callus issues. Patient has h/o arthritis, asthma, skin cancer, cellulitis, sinusitis, diabetes mellitus, coronary artery deficiencies, enlarged prostate, fatty liver, GERD, hyperlipidemia, atrial fibrillation, shortness of breath, stroke.  Patient presents with thick, dystrophic nails.  Patient has previously had a right second toe amputation.  Patient also reports a open area to the right first metatarsal after stepping on a screw over the weekend.  He reports that on Saturday he noted a small screw in his shoe.  He removed the screw and noted a small open area that was bleeding.  Notes that he had a previous ulcer before his toe was amputated for approximately 5 years.   Patient is IDDM and unsure of last BG level.  Denies pain.  Patient has significant numbness and tingling to his feet.  Relates previous treatment(s) including podiatry care, right hallux arthrodesis. Denies any constitutional symptoms. No other pedal complaints at this time.    Past Medical History:   Diagnosis Date    Arthritis     Asthma     Cancer     skin    Carotid disease, bilateral     Cellulitis     right foot - on antibiotics 6-    Chest pain     Chronic diastolic congestive heart failure 09/07/2019    Chronic sinusitis     Maribell bullosa     Coronary artery disease involving native coronary artery of native heart with  unstable angina pectoris 01/17/2017    Deviated septum     Diabetes mellitus     Difficulty urinating     Diverticulitis     Enlarged prostate     Fatty liver     GERD (gastroesophageal reflux disease)     [place] (hard of hearing)     Does have hearing aids    Hyperlipidemia LDL goal <70 02/02/2017    Hypertrophy of nasal turbinates     Keratoderma     Kidney stone     Lung nodules     lower right lung    Migraine     Murmur, heart     Myocardial infarction     Obesity     Paroxysmal atrial fibrillation 07/11/2019    Personal history of COVID-19 07/2021    PONV (postoperative nausea and vomiting)     Primary hypertension 10/16/2016    Psoriasis     Seizures     Sinus congestion     Skin cancer     Sleep apnea     not using cpap    SOB (shortness of breath)     Stroke     mild weakness on right side    UTI (urinary tract infection)      Past Surgical History:   Procedure Laterality Date    AMPUTATION DIGIT Right 4/30/2025    Procedure: Partial vs Complete Amputation of 2nd Toe, Hardware Removal from Hallux - Right Foot;  Surgeon: Hernan Villa DPM;  Location:  [facility] OR;  Service: Podiatry;  Laterality: Right;    CARDIAC CATHETERIZATION  01/2016    Dr. Broadbent; widely patent previously placed stents in the left anterior descending and obstructive disease involving the diagonal branch which was treated medically    CARDIAC CATHETERIZATION N/A 07/14/2017    Procedure: Left Heart Cath;  Surgeon: Wade Ramey MD;  Location:  [facility] CATH INVASIVE LOCATION;  Service:     CARDIAC CATHETERIZATION Left 10/15/2018    Procedure: Cardiac Catheterization/Vascular Study;  Surgeon: Wade Ramey MD;  Location:  [facility] CATH INV[facility]IVE [facility];  Service: Cardiology    CARDIAC CATHETERIZATION  10/15/2018    Procedure: Functional Flow [place];  Surgeon: Wade Ramey MD;  Location:  [facility] CATH INV[facility];  Service: Cardiology    CARDIAC CATHETERIZATION N/A 10/15/2018    Procedure: Left ventriculography;  Surgeon: Wade Ramey  MD;  Location:  [facility] CATH [facility];  Service: Cardiology    CARDIAC CATHETERIZATION Left 06/26/2019    Procedure: Cardiac Catheterization/Vascular Study VEL OK  HE WILL WAIT 1 YEAR FOR SHOULDER SURGERY ;  Surgeon: Wade Ramey MD;  Location:  [facility] CATH [facility];  Service: Cardiology    CARDIAC CATHETERIZATION Left 04/30/2021    Procedure: Coronary angiography;  Surgeon: Sahil Llamas MD;  Location:  [facility] CATH [facility];  Service: Cardiology;  Laterality: Left;    CARDIAC CATHETERIZATION N/A 04/30/2021    Procedure: Percutaneous Coronary Intervention;  Surgeon: Sahil Llamas MD;  Location:  [facility] CATH [facility];  Service: Cardiology;  Laterality: N/A;    CARDIAC CATHETERIZATION N/A 11/09/2022    Procedure: Left Heart Cath with SVGs;  Surgeon: Wade Ramey MD;  Location:  [facility] CATH [facility];  Service: Cardiology;  Laterality: N/A;    CARPAL TUNNEL RELEASE      January 2024 and pther hand February 2024    CHOLECYSTECTOMY      CHOLECYSTECTOMY WITH INTRAOPERATIVE CHOLANGIOGRAM N/A 08/01/2018    Procedure: CHOLECYSTECTOMY LAPAROSCOPIC INTRAOPERATIVE CHOLANGIOGRAM;  Surgeon: Shane Ann MD;  Location:  [facility] OR;  Service: General    COLONOSCOPY N/A 07/14/2020    Procedure: COLONOSCOPY WITH ANESTHESIA;  Surgeon: Anupam Morales DO;  Location: [facility] ENDOSCOPY;  Service: Gastroenterology;  Laterality: N/A;  pre: abdominal pain  post: diverticulosis  Vinayak Correia PA    CORONARY ANGIOPLASTY      CORONARY ARTERY BYPASS GRAFT N/A 07/06/2019    Procedure: CABG X2 WITH LIMA, LEFT LEG OVH, AND PLACEMENT OF LEFT FEMORAL ARTERIAL LINE;  Surgeon: Steven Tang MD;  Location: [facility] OR;  Service: Cardiothoracic    CORONARY STENT PLACEMENT      x 6    CYSTOSCOPY TRANSURETHRAL RESECTION OF PROSTATE N/A 01/23/2023    Procedure: CYSTOSCOPY TRANSURETHRAL RESECTION OF PROSTATE;  Surgeon: Latrell Pope MD;  Location:  [facility] OR;  Service: Urology;  Laterality:  N/A;    ENDOSCOPIC FUNCTIONAL SINUS SURGERY (FESS) Bilateral 12/13/2017    Procedure: PROCEDURE PERFORMED:  Bilateral functional endoscopic anterior ethmoidectomy with bilateral middle meatal antrostomy Septoplasty Right kathia bullosa resection Bilateral inferior turbinate reduction via Coblation;  Surgeon: Mayank Ibarra MD;  Location:  [facility] OR;  Service:     ENDOSCOPY N/A 07/30/2018    Procedure: ESOPHAGOGASTRODUODENOSCOPY WITH ANESTHESIA;  Surgeon: Benitez Mas MD;  Location:  [facility] ENDOS[facility];  Service: Gastroenterology    ENDOSCOPY N/A 07/14/2020    Procedure: ESOPHAGOGASTRODUODENOSCOPY WITH ANESTHESIA;  Surgeon: Anupam Morales DO;  Location:  [facility] ENDOS[facility];  Service: Gastroenterology;  Laterality: N/A;  pre: abdominal pain  post: esophagitis  Vinayak Correia, PA    HARDWARE REMOVAL Right 4/30/2025    Procedure: Hardware Removal from Hallux - Right Foot;  Surgeon: Hernan Villa DPM;  Location:  [facility] OR;  Service: Podiatry;  Laterality: Right;    HERNIA REPAIR      x2 inguinal area    KIDNEY STONE SURGERY      KNEE ARTHROSCOPY Right 03/01/2022    Procedure: RIGHT KNEE PARTIAL LATERAL MENISCECTOMY;  Surgeon: Pedro Pablo Song MD;  Location:  [facility] OR;  Service: Orthopedics;  Laterality: Right;    KNEE SURGERY Right     OTHER SURGICAL HISTORY      urolift    PROSTATE SURGERY      Dr. Badillo - 2017    PULMONARY ARTERY PRESSURE SENSOR IMPLANT N/A 05/08/2023    Procedure: PA Pressure Sensor Implant;  Surgeon: Wade Ramey MD;  Location: [facility] CATH INVASIVE LOCATION;  Service: Cardiology;  Laterality: N/A;    ROTATOR CUFF REPAIR Right     SLEEP ENDOSCOPY N/A 02/27/2023    Procedure: Videosleep endoscopy;  Surgeon: Mayank Ibarra MD;  Location:  [facility] OR;  Service: ENT;  Laterality: N/A;    THUMB AMPUTATION Left     partial    TOE FUSION Right 7/3/2024    Procedure: Hallux Interphalangeal Joint Arthrodesis, Bone Graft [place] - Right Foot;  Surgeon: Hernan Villa DPM;   Location:  [facility] OR;  Service: Podiatry;  Laterality: Right;    TOE SURGERY      great toe     Family History   Problem Relation Age of Onset    Heart disease Father     COPD Mother     Hypertension Mother     Asthma Mother     No Known Problems Sister     Colon cancer Paternal Uncle     Prostate cancer Maternal Grandfather     No Known Problems Sister     Colon cancer Maternal Grandmother     Colon polyps Maternal Grandmother      Social History     Socioeconomic History    Marital status:    Tobacco Use    Smoking status: Former     Current packs/day: 0.00     Average packs/day: 1.5 packs/day for 18.0 years (27.0 ttl pk-yrs)     Types: Cigarettes, Cigars     Start date:      Quit date:      Years since quittin.5     Passive exposure: Past    Smokeless tobacco: Former     Types: Chew     Quit date:    Vaping Use    Vaping status: Never Used   Substance and Sexual Activity    Alcohol use: No     Comment: 0    Drug use: No    Sexual activity: Defer     Allergies   Allergen Reactions    Flagyl [Metronidazole] Hives    Atorvastatin Other (See Comments) and Myalgia      - LIPITOR -   Muscle cramps    Ciprofloxacin Hives      - CIPRO -      Current Outpatient Medications   Medication Sig Dispense Refill    acetaminophen (TYLENOL) 325 MG tablet Take 1 tablet by mouth Every 6 (Six) Hours As Needed for Mild Pain.      apixaban (ELIQUIS) 5 MG tablet tablet Take 1 tablet by mouth 2 (Two) Times a Day.      aspirin 81 MG EC tablet Take 1 tablet by mouth Daily.      calcium polycarbophil (FIBERCON) 625 MG tablet Take 1 tablet by mouth Daily.      docusate sodium (COLACE) 100 MG capsule Take 1 capsule by mouth Daily As Needed for Constipation.      empagliflozin (JARDIANCE) 25 MG tablet tablet Take 1 tablet by mouth Daily.      ezetimibe (ZETIA) 10 MG tablet Take 1 tablet by mouth Daily.      fluticasone (FLONASE) 50 MCG/ACT nasal spray Administer 2 sprays into the nostril(s) as directed by provider Daily.       furosemide (Lasix) 40 MG tablet Take 1 tablet by mouth Daily As Needed (take as needed for weight gain more than 2 pounds in a day or more than than 3 pounds in 2 days.). 90 tablet 3    gabapentin (NEURONTIN) 300 MG capsule Take 2 capsules by mouth 2 (Two) Times a Day.      guaiFENesin (MUCINEX) 600 MG 12 hr tablet Take 2 tablets by mouth Daily As Needed for Congestion.      insulin aspart (novoLOG FLEXPEN) 100 UNIT/ML solution pen-injector sc pen Inject 50 Units under the skin into the appropriate area as directed 3 (Three) Times a Day With Meals. ADMINISTER 10 MINUTES BEFORE FOOD AS DIRECTED. REFRIGERATE UN-OPENED PENS. DISCARD CARTRIDGE 28 DAYS AFTER OPENING.      insulin glargine (LANTUS, SEMGLEE) 100 UNIT/ML injection Inject 30 Units under the skin into the appropriate area as directed Daily.      isosorbide mononitrate (IMDUR) 120 MG 24 hr tablet Take 1 tablet by mouth Daily. 90 tablet 3    lamoTRIgine (LaMICtal) 200 MG tablet Take 1 tablet by mouth 2 (Two) Times a Day. 180 tablet 3    levETIRAcetam (KEPPRA) 500 MG tablet Take 3 tablets by mouth Every Morning. 270 tablet 3    levETIRAcetam (KEPPRA) 500 MG tablet Take 4 tablets by mouth Every Night. 360 tablet 3    metFORMIN (GLUCOPHAGE) 1000 MG tablet Take 1 tablet by mouth 2 (Two) Times a Day With Meals.      metoprolol tartrate (LOPRESSOR) 100 MG tablet Take 1 tablet by mouth 2 (Two) Times a Day.      Multiple Vitamin (MULTI VITAMIN PO) Take 1 tablet by mouth Daily.      nitroglycerin (NITROSTAT) 0.4 MG SL tablet Place 1 tablet under the tongue Every 5 (Five) Minutes As Needed for Chest Pain. Take no more than 3 doses in 15 minutes. 25 tablet 2    pantoprazole (PROTONIX) 40 MG EC tablet Take 1 tablet by mouth 2 (Two) Times a Day.      polyethylene glycol (MIRALAX) 17 g packet Take 17 g by mouth Daily.      rimegepant sulfate ODT (Nurtec) 75 MG disintegrating tablet Place 1 tablet under the tongue 1 (One) Time As Needed (at onset) for up to 180 days. 8  tablet 5    rosuvastatin (CRESTOR) 40 MG tablet Take 0.5 tablets by mouth Every Night.      sacubitril-valsartan (Entresto)  MG tablet Take 1 tablet by mouth 2 (Two) Times a Day.      tamsulosin (FLOMAX) 0.4 MG capsule 24 hr capsule Take 1 capsule by mouth Daily.      traMADol (ULTRAM) 50 MG tablet Take 1 tablet by mouth 4 (Four) Times a Day As Needed for Moderate Pain or Severe Pain (pain not relieved by Gabapentin).       No current facility-administered medications for this visit.     Review of Systems   Constitutional:  Negative for activity change.   HENT:  Negative for congestion.    Respiratory:  Negative for apnea and shortness of breath.    Gastrointestinal:  Negative for abdominal pain.   Musculoskeletal:  Positive for gait problem. Negative for arthralgias and back pain.   Skin:  Positive for wound.   Neurological:  Positive for weakness and numbness.   Psychiatric/Behavioral:  Negative for agitation, behavioral problems and confusion.        OBJECTIVE     Vitals:    07/21/25 1118   BP: 138/72   Pulse: 54   SpO2: 96%         PHYSICAL EXAM  GEN:   Accompanied by none.     Foot/Ankle Exam    GENERAL  Appearance:  appears stated age  Orientation:  AAOx3  Affect:  appropriate  Gait:  unimpaired  Assistance:  independent  Right shoe gear: diabetic shoe  Left shoe gear: diabetic shoe    VASCULAR     Right Foot Vascularity   Dorsalis pedis:  2+  Posterior tibial:  2+  Skin temperature:  warm  Edema grading:  None  CFT:  3  Pedal hair growth:  Present  Varicosities:  none     Left Foot Vascularity   Dorsalis pedis:  2+  Posterior tibial:  2+  Skin temperature:  warm  Edema grading:  None  CFT:  3  Pedal hair growth:  Present  Varicosities:  none     NEUROLOGIC     Right Foot Neurologic   Light touch sensation: diminished  Vibratory sensation: diminished  Hot/Cold sensation: diminished  Protective Sensation using [place]-Ras Monofilament:   Right Foot Sites Intact: 0.  Sites tested: 10     Left Foot  Neurologic   Light touch sensation: diminished  Vibratory sensation: diminished  Hot/Cold sensation:  diminished  Protective Sensation using [place]-Ras Monofilament:   Left Foot Sites Intact: 0.  Sites tested: 10    MUSCULOSKELETAL     Right Foot Musculoskeletal    Amputation   Toes amputated: second toe  Ecchymosis:  none  Tenderness:  none    Arch:  Normal     Left Foot Musculoskeletal   Ecchymosis:  none  Tenderness:  none  Arch:  Normal    MUSCLE STRENGTH     Right Foot Muscle Strength   Foot dorsiflexion:  5  Foot plantar flexion:  5  Foot inversion:  5  Foot eversion:  5     Left Foot Muscle Strength   Foot dorsiflexion:  5  Foot plantar flexion:  5  Foot inversion:  5  Foot eversion:  5    RANGE OF MOTION     Right Foot Range of Motion   Foot and ankle ROM within normal limits       Left Foot Range of Motion   Foot and ankle ROM within normal limits      DERMATOLOGIC      Right Foot Dermatologic   Skin  Right foot skin is intact.   Nails  1.  Positive for abnormal thickness.  2.  Absent.  3.  Positive for elongated, onychomycosis and abnormal thickness.  4.  Positive for elongated, onychomycosis and abnormal thickness.  5.  Positive for elongated, onychomycosis and abnormal thickness.     Left Foot Dermatologic   Skin  Left foot skin is intact.   Nails  1.  Positive for elongated, onychomycosis and abnormal thickness.  2.  Positive for elongated, onychomycosis and abnormal thickness.  3.  Positive for elongated, onychomycosis, abnormal thickness and subungual debris.  4.  Positive for elongated, onychomycosis and abnormally thick.  5.  Positive for elongated, onychomycosis and abnormally thick.    Image:       RADIOLOGY/NUCLEAR:  XR Foot 3+ View Right  Result Date: 7/1/2025  Narrative: EXAMINATION:  XR FOOT 3+ VW RIGHT-   HISTORY: Right foot pain. Prior history of infection. E11.42-Type 2 diabetes mellitus with diabetic polyneuropathy; Z79.4-Long term (current) use of insulin; L60.0-Ingrowing nail.   COMPARISON: No comparison study.  TECHNIQUE: 3 views were obtained. Gleamer AI fracture analysis was utilized.  FINDINGS: There has been prior partial amputation of the second toe. There is very slight cortical irregularity of the right second toe proximal phalanx amputation stump. There is mild hallux valgus deformity. There is narrowing of the first MTP joint and the interphalangeal joint space of the great toe. There is a screw tract through the great toe. There is a small to moderate size plantar calcaneal spur. There are lucencies in the calcaneus that may be related to prior bone harvesting. There is posterior calcaneal enthesopathy that is mild.       Impression: 1. Prior second toe partial amputation. There is slight cortical irregularity of the distal proximal phalanx amputation stump. Early osteomyelitis in this area cannot be ruled out. 2. Degenerative narrowing of the first MTP joint and the interphalangeal joint of the great toe. Mild hallux valgus. Screw tract within the great toe related to prior surgery. 3. Calcaneal spurring and enthesopathy. Lucency within the calcaneus probably related to prior bone harvesting. Correlate clinically.   This report was signed and finalized on 7/1/2025 1:12 PM by Dr. Wojciech Davidson MD.          LABORATORY/CULTURE RESULTS:      PATHOLOGY RESULTS:       ASSESSMENT/PLAN     Diagnoses and all orders for this visit:    1. Thickened nails (Primary)    2. Diabetic ulcer of right foot associated with type 2 diabetes mellitus, limited to breakdown of skin, unspecified part of foot  -     Ambulatory Referral to Wound Clinic    3. Type 2 diabetes mellitus with diabetic polyneuropathy, with long-term current use of insulin    4. 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 Foot callus    6. Encounter for diabetic foot exam    7. Avulsion of toenail, subsequent encounter        Comprehensive lower extremity examination and evaluation was performed.  Discussed findings  and treatment plan including risks, benefits, and treatment options with patient in detail. Patient agreed with treatment plan.  Diabetic foot exam performed today.  Nail avulsion site is healed.  Open wound is noted to the right foot.  Area was covered with antibiotic ointment and Band-Aid and metatarsal pad was used for offloading until he can get into see wound care.  Referral for [facility] wound care placed due to right foot wound..  After verbal consent obtained, nail(s) x10 debrided of length and thickness with nail nipper without incidence  After verbal consent obtained, calluses x1 pared utilizing dermal curette and/or scalpel without incidence  Patient may maintain nails and calluses at home utilizing emery board or pumice stone between visits as needed  Reviewed at home diabetic foot care including daily foot checks   Due to diabetic polyneuropathy, diabetic ulcer, foot callusing, and previous amputation of toe it is recommended patient return for routine foot and nail care.  Encourage patient to call with any questions or concerns before next appointment.  An After Visit Summary was printed and given to the patient at discharge, including (if requested) any available informative/educational handouts regarding diagnosis, treatment, or medications. All questions were answered to patient/family satisfaction. Should symptoms fail to improve or worsen they agree to call or return to clinic or to go to the Emergency Department. Discussed the importance of following up with any needed screening tests/labs/specialist appointments and any requested follow-up recommended by me today. Importance of maintaining follow-up discussed and patient accepts that missed appointments can delay diagnosis and potentially lead to worsening of conditions.  Return in about 9 weeks (around 9/22/2025) for [facility], Diabetic foot care clinic., or sooner if acute issues arise.      Procedures     This document has  been electronically signed by TEENA Nice on July 21, 2025 12:00 CDT

## 2025-07-28 ENCOUNTER — OFFICE VISIT (OUTPATIENT)
Dept: WOUND CARE | Facility: HOSPITAL | Age: 65
End: 2025-07-28
Payer: OTHER GOVERNMENT

## 2025-08-04 ENCOUNTER — OFFICE VISIT (OUTPATIENT)
Dept: WOUND CARE | Facility: HOSPITAL | Age: 65
End: 2025-08-04
Payer: OTHER GOVERNMENT

## 2025-08-11 ENCOUNTER — OFFICE VISIT (OUTPATIENT)
Dept: WOUND CARE | Facility: HOSPITAL | Age: 65
End: 2025-08-11
Payer: OTHER GOVERNMENT

## 2025-08-13 RX ORDER — GABAPENTIN 300 MG/1
600 CAPSULE ORAL 2 TIMES DAILY
Qty: 120 CAPSULE | Refills: 0 | OUTPATIENT
Start: 2025-08-13

## 2025-08-18 ENCOUNTER — OFFICE VISIT (OUTPATIENT)
Dept: WOUND CARE | Facility: HOSPITAL | Age: 65
End: 2025-08-18
Payer: OTHER GOVERNMENT

## 2025-08-21 ENCOUNTER — TELEPHONE (OUTPATIENT)
Dept: NEUROLOGY | Facility: CLINIC | Age: 65
End: 2025-08-21
Payer: OTHER GOVERNMENT

## 2025-08-21 RX ORDER — GABAPENTIN 300 MG/1
600 CAPSULE ORAL 2 TIMES DAILY
OUTPATIENT
Start: 2025-08-21

## 2025-08-25 ENCOUNTER — OFFICE VISIT (OUTPATIENT)
Dept: WOUND CARE | Facility: HOSPITAL | Age: 65
End: 2025-08-25
Payer: OTHER GOVERNMENT

## 2025-08-25 PROCEDURE — G0463 HOSPITAL OUTPT CLINIC VISIT: HCPCS

## (undated) DEVICE — MODEL AT P65, P/N 701554-001KIT CONTENTS: HAND CONTROLLER, 3-WAY HIGH-PRESSURE STOPCOCK WITH ROTATING END AND PREMIUM HIGH-PRESSURE TUBING: Brand: ANGIOTOUCH® KIT

## (undated) DEVICE — GLV SURG TRIUMPH MICRO PF LTX 8.5 STRL

## (undated) DEVICE — BANDAGE,GAUZE,BULKEE II,4.5"X4.1YD,STRL: Brand: MEDLINE

## (undated) DEVICE — PK TURNOVER RM ADV

## (undated) DEVICE — IMPLANTABLE DEVICE
Type: IMPLANTABLE DEVICE | Site: FOOT | Status: NON-FUNCTIONAL
Removed: 2024-07-03

## (undated) DEVICE — PINNACLE INTRODUCER SHEATH: Brand: PINNACLE

## (undated) DEVICE — FR5 INFINITI MULTIPAC: Brand: INFINITI

## (undated) DEVICE — INTENDED FOR TISSUE SEPARATION, AND OTHER PROCEDURES THAT REQUIRE A SHARP SURGICAL BLADE TO PUNCTURE OR CUT.: Brand: BARD-PARKER ® STAINLESS STEEL BLADES

## (undated) DEVICE — GW STARTER FXD CORE J .035 3X150CM 3MM

## (undated) DEVICE — GLV SURG TRIUMPH MICRO PF LTX 7.5 STRL

## (undated) DEVICE — GC 7F 078 JL 4: Brand: VISTA BRITE TIP

## (undated) DEVICE — PAD, DEFIB, ADULT, RADIOTRANS, PHYSIO: Brand: MEDLINE

## (undated) DEVICE — DRSNG SURESITE WNDW 4X4.5

## (undated) DEVICE — NDL HYPO PRECISIONGLIDE/REG 18G 11/2 PNK

## (undated) DEVICE — SURGICAL SUCTION CONNECTING TUBE WITH MALE CONNECTOR AND SUCTION CLAMP, 2 FT. LONG (.6 M), 5 MM I.D.: Brand: CONMED

## (undated) DEVICE — SYR LL TP 10ML STRL

## (undated) DEVICE — Device

## (undated) DEVICE — PK CATH CARD 30 CA/4

## (undated) DEVICE — UNDERCAST PADDING: Brand: DEROYAL

## (undated) DEVICE — YANKAUER,BULB TIP WITH VENT: Brand: ARGYLE

## (undated) DEVICE — TOOL INSRT GW MTL OR PLSTC

## (undated) DEVICE — ST INTRO SP TBG 8TO14F 14CM

## (undated) DEVICE — Device: Brand: DEFENDO AIR/WATER/SUCTION AND BIOPSY VALVE

## (undated) DEVICE — CANN NASL ETCO2 LO/FLO A/

## (undated) DEVICE — GW SENSR DUALFLEX NITNL STR .038IN 3X150CM

## (undated) DEVICE — AORTIC PUNCHES ARE USED TO CREATE A UNIFORM OPENING IN BLOOD VESSELS DURING CARDIOVASCULAR SURGERY. THE VESSEL GRAFT IS INSERTED INTO THE CREATED OPENING AND SUTURED TO THE VESSEL WALL. AORTIC LANCETS ARE USED TO MAKE A SMALL UNIFORM CUT IN A BLOOD VESSEL TO FACILITATE INSERTION OF AN AORTIC PUNCH.  PUNCHES COME IN VARIOUS LENGTHS, DIAMETERS AND TIP CONFIGURATIONS.: Brand: CLEANCUT ROTATING AORTIC PUNCH

## (undated) DEVICE — GLV SURG BIOGEL LTX PF 6 1/2

## (undated) DEVICE — ELECTRD PAD DEFIB A/

## (undated) DEVICE — ST TB EXT STANDARDBORE 30IN

## (undated) DEVICE — PK TURNOVER CYSTO RM

## (undated) DEVICE — SYS DISTNTION VEIN BONCHEK 300MMHG

## (undated) DEVICE — RESERVOIR,SUCTION,100CC,SILICONE: Brand: MEDLINE

## (undated) DEVICE — PK EXTRM 30

## (undated) DEVICE — ADHS LIQ MASTISOL 2/3ML

## (undated) DEVICE — CANN CO2/O2 NASL A/

## (undated) DEVICE — FRCP BX RADJAW4 NDL 2.8 240 STD OG

## (undated) DEVICE — KT NDL GUIDE STRL 18GA

## (undated) DEVICE — TOWEL,OR,DSP,ST,BLUE,STD,4/PK,20PK/CS: Brand: MEDLINE

## (undated) DEVICE — MODEL BT2000 P/N 700287-012KIT CONTENTS: MANIFOLD WITH SALINE AND CONTRAST PORTS, SALINE TUBING WITH SPIKE AND HAND SYRINGE, TRANSDUCER: Brand: BT2000 AUTOMATED MANIFOLD KIT

## (undated) DEVICE — BUTN RESECT IGLESIUS 5PK 1P/U

## (undated) DEVICE — HI-TORQUE STEELCORE 18 LT PERIPHERAL GUIDE WIRE 300 CM: Brand: HI-TORQUE STEELCORE

## (undated) DEVICE — SYR LUERLOK 20CC

## (undated) DEVICE — HDRST POSITIONING FM RND 2X9IN

## (undated) DEVICE — 1/2 FORCE SURGICAL SPRING CLIP: Brand: STEALTH® SPRING CLIP

## (undated) DEVICE — 4.0MM EGG BUR

## (undated) DEVICE — COPILOT BLEEDBACK CONTROL VALVE: Brand: COPILOT

## (undated) DEVICE — GLV SURG DERMASSURE GRN LF PF 8.0

## (undated) DEVICE — SUT GUT CHRM 4/0 P3 18IN 1654G

## (undated) DEVICE — WIPE MEROCEL 3.625X3IN

## (undated) DEVICE — THE CHANNEL CLEANING BRUSH IS A NYLON FLEXI BRUSH ATTACHED TO A FLEXIBLE PLASTIC SHEATH DESIGNED TO SAFELY REMOVE DEBRIS FROM FLEXIBLE ENDOSCOPES.

## (undated) DEVICE — GUIDELINER CATHETERS ARE INTENDED TO BE USED IN CONJUNCTION WITH GUIDE CATHETERS TO ACCESS DISCRETE REGIONS OF THE CORONARY AND/OR PERIPHERAL VASCULATURE, AND TO FACILITATE PLACEMENT OF INTERVENTIONAL DEVICES.: Brand: GUIDELINER® V3 CATHETER

## (undated) DEVICE — SYRINGE,PISTON,IRRIGATION,60ML,STERILE: Brand: MEDLINE

## (undated) DEVICE — SUT VIC 3/0 RB1 27IN UD VCP215H

## (undated) DEVICE — TBG ARTHRO FLOWSTEADY/ST DISP

## (undated) DEVICE — PK KN ARTHSCP 30

## (undated) DEVICE — PATIENT TRACKER 9734887XOM NON-INVASIVE

## (undated) DEVICE — [TOMCAT CUTTER, ARTHROSCOPIC SHAVER BLADE,  DO NOT RESTERILIZE,  DO NOT USE IF PACKAGE IS DAMAGED,  KEEP DRY,  KEEP AWAY FROM SUNLIGHT]: Brand: FORMULA

## (undated) DEVICE — MONOPOLAR METZENBAUM SCISSOR, MINI BLADE TIP, DISPOSABLE: Brand: MONOPOLAR METZENBAUM SCISSOR, MINI BLADE TIP, DISPOSABLE

## (undated) DEVICE — PAD LAP CHOLE: Brand: MEDLINE INDUSTRIES, INC.

## (undated) DEVICE — 3M™ STERI-STRIP™ REINFORCED ADHESIVE SKIN CLOSURES, R1546, 1/4 IN X 4 IN (6 MM X 100 MM), 10 STRIPS/ENVELOPE: Brand: 3M™ STERI-STRIP™

## (undated) DEVICE — DISPOSABLE TOURNIQUET CUFF SINGLE BLADDER, SINGLE PORT AND QUICK CONNECT CONNECTOR: Brand: COLOR CUFF

## (undated) DEVICE — CVR BRD ARM 13X30

## (undated) DEVICE — PK OPN HEART 30

## (undated) DEVICE — PK CATH CARD 30

## (undated) DEVICE — SUT STL 2/0 CV SH 24IN TPW32

## (undated) DEVICE — TBG SMPL FLTR LINE NASL 02/C02 A/ BX/100

## (undated) DEVICE — MYNXGRIP 6F/7F: Brand: MYNXGRIP

## (undated) DEVICE — PK ENT HD AND NK 30

## (undated) DEVICE — SYR LUERLOK 50ML

## (undated) DEVICE — DRSNG WND GZ CURAD OIL EMULSION 3X8IN STRL PK/3

## (undated) DEVICE — PK CYSTO 30

## (undated) DEVICE — PAD GRND REM POLYHESIVE A/ DISP

## (undated) DEVICE — TRAP FLD MINIVAC MEGADYNE 100ML

## (undated) DEVICE — ENDOPOUCH RETRIEVER SPECIMEN RETRIEVAL BAGS: Brand: ENDOPOUCH RETRIEVER

## (undated) DEVICE — CONTAINER,SPECIMEN,OR STERILE,4OZ: Brand: MEDLINE

## (undated) DEVICE — ST CATH CHOLANG WKARLAN/BALN 2LUM 4F 1.25

## (undated) DEVICE — BLD SCLPL BEAVR MINI STR 2BVL 180D LF

## (undated) DEVICE — 3M™ STERI-STRIP™ REINFORCED ADHESIVE SKIN CLOSURES, R1547, 1/2 IN X 4 IN (12 MM X 100 MM), 6 STRIPS/ENVELOPE: Brand: 3M™ STERI-STRIP™

## (undated) DEVICE — DISPOSABLE TOURNIQUET CUFF 34"X4", 1-LINE, BLUE, STERILE, 1EA/PK, 10PK/CS: Brand: ASP MEDICAL

## (undated) DEVICE — KT DRP MINIVIEW STRL

## (undated) DEVICE — SOL IRR NACL 0.9PCT BT 1000ML

## (undated) DEVICE — ST FLD IRR WARM

## (undated) DEVICE — CLTH CLENS READYCLEANSE PERI CARE PK/5

## (undated) DEVICE — SOLIDIFIER LIQUI LOC PLUS 2000CC

## (undated) DEVICE — VASOVIEW HEMOPRO: Brand: VASOVIEW HEMOPRO

## (undated) DEVICE — PERCLOSE™ PROSTYLE™ SUTURE-MEDIATED CLOSURE AND REPAIR SYSTEM: Brand: PERCLOSE™ PROSTYLE™

## (undated) DEVICE — GW PRESSUREWIRE X WIRELESS FFR 175CM

## (undated) DEVICE — TUBING 1895522 5PK STRAIGHTSHOT TO XPS: Brand: STRAIGHTSHOT®

## (undated) DEVICE — NEEDLE,22GX1.5",REG,BEVEL: Brand: MEDLINE

## (undated) DEVICE — GLV SURG BIOGEL M LTX PF 7 1/2

## (undated) DEVICE — 4-PORT MANIFOLD: Brand: NEPTUNE 2

## (undated) DEVICE — WIPE THERAWASH SLV SPEC CARE 2PK

## (undated) DEVICE — KT ANTI FOG W/FLD AND SPNG

## (undated) DEVICE — BNDG ELAS CO-FLEX SLF ADHR 4IN5YD LF STRL

## (undated) DEVICE — CLIP APPLIER: Brand: ENDO CLIP

## (undated) DEVICE — DEFOGGER!" ANTI FOG KIT: Brand: DEROYAL

## (undated) DEVICE — GAUZE,SPONGE,4"X4",16PLY,XRAY,STRL,LF: Brand: MEDLINE

## (undated) DEVICE — GC 7F 078 XB 3.5: Brand: VISTA BRITE TIP

## (undated) DEVICE — CONMED SCOPE SAVER BITE BLOCK, 20X27 MM: Brand: SCOPE SAVER

## (undated) DEVICE — SYS HARVST BONE/GRFT OSTEOAUGER AO 8MM 1P/U

## (undated) DEVICE — DRSNG WND GZ CURAD OIL EMULSION 3X3IN STRL

## (undated) DEVICE — INF TL MULIPACK FR6: Brand: INFINITI

## (undated) DEVICE — CUFF,BP,DISP,1 TUBE,ADULT,HP: Brand: MEDLINE

## (undated) DEVICE — PACK NASL SINUS MEROPACK BIORESRB 4X4BX1

## (undated) DEVICE — INFLATION DEVICE: Brand: ENCORE™ 26

## (undated) DEVICE — PAD,ABDOMINAL,8"X10",ST,LF: Brand: MEDLINE

## (undated) DEVICE — SPNG GZ WOVN 4X4IN 12PLY 10/BX STRL

## (undated) DEVICE — SUT MONOCRYL PLS ANTIB UND 3/0  PS1 27IN

## (undated) DEVICE — CODMAN® SURGICAL PATTIES 1/2" X 3" (1.27CM X 7.62CM): Brand: CODMAN®

## (undated) DEVICE — 3M™ IOBAN™ 2 ANTIMICROBIAL INCISE DRAPE 6650EZ: Brand: IOBAN™ 2

## (undated) DEVICE — 2, DISPOSABLE SUCTION/IRRIGATOR WITHOUT DISPOSABLE TIP: Brand: STRYKEFLOW

## (undated) DEVICE — STERNUM BLADE, OFFSET (32.0 X 0.8 X 6.3MM)

## (undated) DEVICE — CATHETER,FOLEY,SILI-ELAST,LTX,18FR,10ML: Brand: MEDLINE

## (undated) DEVICE — BLAKE SILICONE DRAINS CARDIO CONNECTOR 2:1: Brand: BLAKE

## (undated) DEVICE — SUCTION COAGULATOR, 1 PER POUCH: Brand: A&E MEDICAL / DISPOSABLE SUCTION COAGULATOR

## (undated) DEVICE — RUNTHROUGH NS EXTRA FLOPPY PTCA GUIDEWIRE: Brand: RUNTHROUGH

## (undated) DEVICE — PATIENT RETURN ELECTRODE, SINGLE-USE, CONTACT QUALITY MONITORING, ADULT, WITH 9FT CORD, FOR PATIENTS WEIGING OVER 33LBS. (15KG): Brand: MEGADYNE

## (undated) DEVICE — CATH MONTR SWANGANZ WP 2L 7F110CM

## (undated) DEVICE — PK SET UP ANES OPN HEART 30

## (undated) DEVICE — LAPAROSCOPIC MONOPOLAR CORD: Brand: VALLEYLAB

## (undated) DEVICE — ELECTRD SUPERSECT FRNT LOAD 5PK

## (undated) DEVICE — PRECISION THIN (9.0 X 0.38 X 31.0MM)

## (undated) DEVICE — A2000 MULTI-USE SYRINGE KIT, P/N 701277-003KIT CONTENTS: 100ML CONTRAST RESERVOIR AND TUBING WITH CONTRAST SPIKE AND CLAMP: Brand: A2000 MULTI-USE SYRINGE KIT

## (undated) DEVICE — STRIP,CLOSURE,WOUND,MEDI-STRIP,1/2X4: Brand: MEDLINE

## (undated) DEVICE — SUT VIC 0 UR6 27IN VCP603H

## (undated) DEVICE — TRY PREP SCRB VAG PVP

## (undated) DEVICE — BAG,DRAINAGE,4L,A/R TOWER,LL,SLIDE TAP: Brand: MEDLINE

## (undated) DEVICE — SENSR O2 OXIMAX FNGR A/ 18IN NONSTR

## (undated) DEVICE — DOVER HYDROGEL COATED LATEX FOLEY CATHETER, 30 ML, 3-WAY 24 FR/CH (8.0 MM): Brand: DOVER

## (undated) DEVICE — DRAIN,WOUND,ROUND,24FR,5/16",FULL-FLUTED: Brand: MEDLINE

## (undated) DEVICE — SUCTION MAT (LOW PROFILE), 50X34: Brand: NEPTUNE

## (undated) DEVICE — EVAC BLDR UROVAC W ADAPT

## (undated) DEVICE — GLV SURG SENSICARE PI ORTHO SZ7.5 LF STRL

## (undated) DEVICE — SUT ETHLN 3/0 FS1 30IN 669H

## (undated) DEVICE — ANGIO-SEAL VIP VASCULAR CLOSURE DEVICE: Brand: ANGIO-SEAL

## (undated) DEVICE — GLV SURG BIOGEL LTX PF 7 1/2

## (undated) DEVICE — TUBING, SUCTION, 1/4" X 12', STRAIGHT: Brand: MEDLINE

## (undated) DEVICE — SUT VIC 4/0 P3 18IN UD VCP494H

## (undated) DEVICE — SOLIDIFIER LIQ ISOLYSER LTS/PLS 3000CC

## (undated) DEVICE — ST PRIM PUMP 20DRP 3VLV 127IN

## (undated) DEVICE — PRECISION THIN (9.0 X 0.38 X 25.0MM)

## (undated) DEVICE — MINI TREK CORONARY DILATATION CATHETER 2.0 MM X 20 MM / RAPID-EXCHANGE: Brand: MINI TREK

## (undated) DEVICE — MASK,OXYGEN,MED CONC,ADLT,7' TUB, UC: Brand: PENDING

## (undated) DEVICE — ENDOGATOR AUXILIARY WATER JET CONNECTOR: Brand: ENDOGATOR

## (undated) DEVICE — BHS TURNOVER CYSTO: Brand: MEDLINE INDUSTRIES, INC.

## (undated) DEVICE — DRSNG GZ PETROLTM CURAD 3X9IN STRL

## (undated) DEVICE — GW STARTER FXD CORE J .035 3X260CM 3MM

## (undated) DEVICE — ELECTRD BLD EDGE/INSUL1P 2.4X5.1MM STRL

## (undated) DEVICE — SUT VIC 5/0 PS3 18IN UD VCP500G

## (undated) DEVICE — GLV SURG SENSICARE PI ORTHO SZ8 LF STRL

## (undated) DEVICE — COLLECTION SOCK, GENERAL: Brand: DEROYAL

## (undated) DEVICE — Device: Brand: MEDEX

## (undated) DEVICE — SINU FOAM: Brand: SINU-FOAM

## (undated) DEVICE — BLAKE SILICONE DRAIN, 19 FR ROUND, HUBLESS WITH 1/4" BENDABLE TROCAR: Brand: BLAKE

## (undated) DEVICE — SOL NACL INJ 0.9PCT 50ML

## (undated) DEVICE — E-PACK PROCEDURE KIT: Brand: E-PACK

## (undated) DEVICE — BLADE 1884012EM RAD12 4MM M4 ROTATE ROHS: Brand: FUSION®

## (undated) DEVICE — SUT SILK 2/0 FS BLK 18IN 685G

## (undated) DEVICE — SYR LUERLOK 20CC BX/50

## (undated) DEVICE — INSTR TRACKER 9733533 EM ENT

## (undated) DEVICE — PREP SOL POVIDONE/IODINE BT 4OZ